# Patient Record
Sex: FEMALE | Race: BLACK OR AFRICAN AMERICAN | NOT HISPANIC OR LATINO | Employment: OTHER | URBAN - METROPOLITAN AREA
[De-identification: names, ages, dates, MRNs, and addresses within clinical notes are randomized per-mention and may not be internally consistent; named-entity substitution may affect disease eponyms.]

---

## 2017-01-03 ENCOUNTER — OFFICE VISIT (OUTPATIENT)
Dept: ORTHOPEDICS | Facility: CLINIC | Age: 72
End: 2017-01-03
Payer: COMMERCIAL

## 2017-01-03 ENCOUNTER — TELEPHONE (OUTPATIENT)
Dept: ORTHOPEDICS | Facility: CLINIC | Age: 72
End: 2017-01-03

## 2017-01-03 VITALS — HEART RATE: 98 BPM | SYSTOLIC BLOOD PRESSURE: 187 MMHG | DIASTOLIC BLOOD PRESSURE: 101 MMHG

## 2017-01-03 DIAGNOSIS — M17.11 PRIMARY OSTEOARTHRITIS OF RIGHT KNEE: Primary | ICD-10-CM

## 2017-01-03 PROCEDURE — 1160F RVW MEDS BY RX/DR IN RCRD: CPT | Mod: S$GLB,,, | Performed by: PHYSICIAN ASSISTANT

## 2017-01-03 PROCEDURE — 1125F AMNT PAIN NOTED PAIN PRSNT: CPT | Mod: S$GLB,,, | Performed by: PHYSICIAN ASSISTANT

## 2017-01-03 PROCEDURE — 99213 OFFICE O/P EST LOW 20 MIN: CPT | Mod: 25,S$GLB,, | Performed by: PHYSICIAN ASSISTANT

## 2017-01-03 PROCEDURE — 20610 DRAIN/INJ JOINT/BURSA W/O US: CPT | Mod: RT,S$GLB,, | Performed by: PHYSICIAN ASSISTANT

## 2017-01-03 PROCEDURE — 3080F DIAST BP >= 90 MM HG: CPT | Mod: S$GLB,,, | Performed by: PHYSICIAN ASSISTANT

## 2017-01-03 PROCEDURE — 1159F MED LIST DOCD IN RCRD: CPT | Mod: S$GLB,,, | Performed by: PHYSICIAN ASSISTANT

## 2017-01-03 PROCEDURE — 3077F SYST BP >= 140 MM HG: CPT | Mod: S$GLB,,, | Performed by: PHYSICIAN ASSISTANT

## 2017-01-03 PROCEDURE — 99999 PR PBB SHADOW E&M-EST. PATIENT-LVL III: CPT | Mod: PBBFAC,,, | Performed by: PHYSICIAN ASSISTANT

## 2017-01-03 PROCEDURE — 1157F ADVNC CARE PLAN IN RCRD: CPT | Mod: S$GLB,,, | Performed by: PHYSICIAN ASSISTANT

## 2017-01-03 RX ORDER — METHYLPREDNISOLONE ACETATE 80 MG/ML
80 INJECTION, SUSPENSION INTRA-ARTICULAR; INTRALESIONAL; INTRAMUSCULAR; SOFT TISSUE
Status: COMPLETED | OUTPATIENT
Start: 2017-01-03 | End: 2017-01-03

## 2017-01-03 RX ADMIN — METHYLPREDNISOLONE ACETATE 80 MG: 80 INJECTION, SUSPENSION INTRA-ARTICULAR; INTRALESIONAL; INTRAMUSCULAR; SOFT TISSUE at 12:01

## 2017-01-03 NOTE — MR AVS SNAPSHOT
[facility]  [address]  [facility] [address]  Phone: [phone]                  Kaylee Romero   1/3/2017 10:15 AM   Appointment    Description:  Female : 1945   Provider:  Cyndi hWite PA-C   Department:  [facility]                To Do List           Future Appointments        Provider Department Dept Phone    2017 1:00 PM Sylvia Barajas RD [facility] - Diabetes Management [phone]    3/21/2017 1:00 PM LAB[facility] [phone]    3/28/2017 1:30 PM Jamshid Rodrigez, APRN,ANP-C [facility] - Endocrinology [phone]      Goals (5 Years of Data)     None      [facility]     [facility] On Call Nurse Care Line -  Assistance  Registered nurses in the [facility] provide clinical advisement, health education, appointment booking, and other advisory services.  Call for this free service at [phone].             Medications           Message regarding Medications     Verify the changes and/or additions to your medication regime listed below are the same as discussed with your clinician today.  If any of these changes or additions are incorrect, please notify your healthcare provider.             Verify that the below list of medications is an accurate representation of the medications you are currently taking.  If none reported, the list may be blank. If incorrect, please contact your healthcare provider. Carry this list with you in case of emergency.           Current Medications     alcohol swabs (ALCOHOL PADS) PadM Apply 1 each topically as needed.    amlodipine (NORVASC) 10 MG tablet take 1 tablet by mouth every morning    blood sugar diagnostic (ONETOUCH VERIO) Strp 1 strip by Misc.(Non-Drug; Combo Route) route 2 (two) times daily.    cyanocobalamin (VITAMIN B-12) 1000 MCG tablet Take 1,000 mcg by mouth once daily.    cyclobenzaprine (FLEXERIL) 10 MG tablet Take 10 mg by mouth 3 (three)  times daily as needed for Muscle spasms.    diphenoxylate-atropine 2.5-0.025 mg (LOMOTIL) 2.5-0.025 mg per tablet Take by mouth 3 (three) times daily as needed. 1 Tablet Oral Three times a day    glimepiride (AMARYL) 2 MG tablet Take 1 tablet (2 mg total) by mouth before breakfast.    ibuprofen (ADVIL,MOTRIN) 800 MG tablet Take 800 mg by mouth as needed.     metformin (GLUCOPHAGE) 500 MG tablet Take 2 tablets (1,000 mg total) by mouth daily with breakfast.    omeprazole (PRILOSEC) 40 MG capsule Take 1 capsule (40 mg total) by mouth once daily.    ONE TOUCH DELICA LANCETS 33 gauge Misc     ONE TOUCH VERIO IQ kit     potassium chloride (MICRO-K) 10 MEQ CpSR take 1 capsule by mouth twice a day    valsartan (DIOVAN) 320 MG tablet take 1 tablet by mouth once daily           Clinical Reference Information           Allergies as of 1/3/2017     Keflex [Cephalexin]    Bactrim [Sulfamethoxazole-trimethoprim]      Immunizations Administered on Date of Encounter - 1/3/2017     None

## 2017-01-03 NOTE — TELEPHONE ENCOUNTER
Spoke with pt who c/o pain to right knee and is requesting to be seen for steroid injection, as she states she is having difficulty walking and that she has to go to work tomorrow.  Noted pt has been seen by [facility]. [facility] for left knee and is considering surgery. Pt states that left knee is, presently,  feeling better than her right knee. Reviewed with FREDRICK Hill and FREDRICK Caballero and pt instructed to come to [facility] at 10:30am today  to be seen by FREDRICK Caballero. Pt states understanding.

## 2017-01-03 NOTE — PROGRESS NOTES
Subjective:      Patient ID: Kaylee Romero is a 71 y.o. female.    Chief Complaint: Pain of the Right Knee    HPI  71 year old female presents with chief complaint of right knee pain x 1 week. She denies trauma. She reports pain at the medial aspect. It is worse with walking and bending. Ibuprofen provides little relief. She reports her knee giving way. She ambulates with a cane. She has h/o left knee OA and plans to undergo left TKA in May with Dr. [facility].   Review of Systems   Constitution: Negative for chills, fever and night sweats.   Cardiovascular: Negative for chest pain.   Respiratory: Negative for cough and shortness of breath.    Hematologic/Lymphatic: Does not bruise/bleed easily.   Skin: Negative for color change.   Gastrointestinal: Negative for heartburn.   Genitourinary: Negative for dysuria.   Neurological: Negative for numbness and paresthesias.   Psychiatric/Behavioral: Negative for altered mental status.   Allergic/Immunologic: Negative for persistent infections.         Objective:            General    Vitals reviewed.  Constitutional: She is oriented to person, place, and time. She appears well-developed and well-nourished.   Cardiovascular: Normal rate.    Neurological: She is alert and oriented to person, place, and time.             Right Knee Exam:  Antalgic gait  ROM 0-120 degrees  +crepitus  No effusion  TTP medial joint line    X-ray: reviewed by myself. DJD both knees L>R.       Assessment:       Encounter Diagnosis   Name Primary?    Primary osteoarthritis of right knee Yes          Plan:       Discussed treatment options with patient. She would like a cortisone injection today. Ice and elevate knee. RTC prn.     PROCEDURE:  I have explained the risks, benefits, and alternatives of the procedure in detail.  The patient voices understanding and all questions have been answered.  The patient agrees to proceed as planned. So after I performed a sterile prep of the skin in the normal  fashion the right knee is injected using a 22 gauge needle from the anteromedial approach with a combination of 4cc 1% plain lidocaine and 80 mg of depo medrol.  The patient is cautioned and immediate relief of pain is secondary to the local anesthetic and will be temporary.  After the anesthetic wears off there may be a increase in pain that may last for a few hours or a few days and they should use ice to help alleviate this flair up of pain.

## 2017-01-03 NOTE — TELEPHONE ENCOUNTER
----- Message from Kim Colorado sent at 1/3/2017  8:37 AM CST -----  Contact: self/home  Pt is unable to walk due to the pain she is experiencing in her rt knee and would like to schedule an appt to have a cortisone inj today.

## 2017-01-06 ENCOUNTER — LAB VISIT (OUTPATIENT)
Dept: LAB | Facility: HOSPITAL | Age: 72
End: 2017-01-06
Attending: FAMILY MEDICINE
Payer: COMMERCIAL

## 2017-01-06 ENCOUNTER — OFFICE VISIT (OUTPATIENT)
Dept: FAMILY MEDICINE | Facility: CLINIC | Age: 72
End: 2017-01-06
Attending: FAMILY MEDICINE
Payer: COMMERCIAL

## 2017-01-06 VITALS
WEIGHT: 221.31 LBS | OXYGEN SATURATION: 96 % | SYSTOLIC BLOOD PRESSURE: 162 MMHG | BODY MASS INDEX: 39.21 KG/M2 | HEART RATE: 81 BPM | DIASTOLIC BLOOD PRESSURE: 88 MMHG | RESPIRATION RATE: 16 BRPM | HEIGHT: 63 IN

## 2017-01-06 DIAGNOSIS — Z78.0 ASYMPTOMATIC MENOPAUSAL STATE: ICD-10-CM

## 2017-01-06 DIAGNOSIS — E11.9 TYPE 2 DIABETES MELLITUS WITHOUT COMPLICATION, WITHOUT LONG-TERM CURRENT USE OF INSULIN: ICD-10-CM

## 2017-01-06 DIAGNOSIS — Z00.00 ANNUAL PHYSICAL EXAM: Primary | ICD-10-CM

## 2017-01-06 DIAGNOSIS — I10 HTN (HYPERTENSION), BENIGN: ICD-10-CM

## 2017-01-06 DIAGNOSIS — Z00.00 ANNUAL PHYSICAL EXAM: ICD-10-CM

## 2017-01-06 DIAGNOSIS — Z12.31 ENCOUNTER FOR SCREENING MAMMOGRAM FOR BREAST CANCER: ICD-10-CM

## 2017-01-06 LAB
ALBUMIN SERPL BCP-MCNC: 3.6 G/DL
ALP SERPL-CCNC: 123 U/L
ALT SERPL W/O P-5'-P-CCNC: 16 U/L
ANION GAP SERPL CALC-SCNC: 10 MMOL/L
AST SERPL-CCNC: 18 U/L
BASOPHILS # BLD AUTO: 0.02 K/UL
BASOPHILS NFR BLD: 0.4 %
BILIRUB SERPL-MCNC: 0.4 MG/DL
BUN SERPL-MCNC: 12 MG/DL
CALCIUM SERPL-MCNC: 10 MG/DL
CHLORIDE SERPL-SCNC: 105 MMOL/L
CHOLEST/HDLC SERPL: 4.5 {RATIO}
CO2 SERPL-SCNC: 23 MMOL/L
CREAT SERPL-MCNC: 1 MG/DL
CREAT UR-MCNC: 109 MG/DL
DIFFERENTIAL METHOD: ABNORMAL
EOSINOPHIL # BLD AUTO: 0.1 K/UL
EOSINOPHIL NFR BLD: 2.8 %
ERYTHROCYTE [DISTWIDTH] IN BLOOD BY AUTOMATED COUNT: 13.8 %
EST. GFR  (AFRICAN AMERICAN): >60 ML/MIN/1.73 M^2
EST. GFR  (NON AFRICAN AMERICAN): 57.6 ML/MIN/1.73 M^2
GLUCOSE SERPL-MCNC: 133 MG/DL
HCT VFR BLD AUTO: 37.3 %
HDL/CHOLESTEROL RATIO: 22 %
HDLC SERPL-MCNC: 218 MG/DL
HDLC SERPL-MCNC: 48 MG/DL
HGB BLD-MCNC: 12.5 G/DL
LDLC SERPL CALC-MCNC: 150.4 MG/DL
LYMPHOCYTES # BLD AUTO: 1.2 K/UL
LYMPHOCYTES NFR BLD: 24.9 %
MCH RBC QN AUTO: 27.2 PG
MCHC RBC AUTO-ENTMCNC: 33.5 %
MCV RBC AUTO: 81 FL
MICROALBUMIN UR DL<=1MG/L-MCNC: 999 UG/ML
MICROALBUMIN/CREATININE RATIO: 916.5 UG/MG
MONOCYTES # BLD AUTO: 0.5 K/UL
MONOCYTES NFR BLD: 9.6 %
NEUTROPHILS # BLD AUTO: 2.9 K/UL
NEUTROPHILS NFR BLD: 62.1 %
NONHDLC SERPL-MCNC: 170 MG/DL
PLATELET # BLD AUTO: 225 K/UL
PMV BLD AUTO: 10 FL
POTASSIUM SERPL-SCNC: 4.4 MMOL/L
PROT SERPL-MCNC: 8.6 G/DL
RBC # BLD AUTO: 4.6 M/UL
SODIUM SERPL-SCNC: 138 MMOL/L
TRIGL SERPL-MCNC: 98 MG/DL
TSH SERPL DL<=0.005 MIU/L-ACNC: 3.08 UIU/ML
WBC # BLD AUTO: 4.69 K/UL

## 2017-01-06 PROCEDURE — 84443 ASSAY THYROID STIM HORMONE: CPT

## 2017-01-06 PROCEDURE — 83036 HEMOGLOBIN GLYCOSYLATED A1C: CPT

## 2017-01-06 PROCEDURE — 36415 COLL VENOUS BLD VENIPUNCTURE: CPT | Mod: PO

## 2017-01-06 PROCEDURE — 80053 COMPREHEN METABOLIC PANEL: CPT

## 2017-01-06 PROCEDURE — 85025 COMPLETE CBC W/AUTO DIFF WBC: CPT

## 2017-01-06 PROCEDURE — 99999 PR PBB SHADOW E&M-EST. PATIENT-LVL IV: CPT | Mod: PBBFAC,,, | Performed by: FAMILY MEDICINE

## 2017-01-06 PROCEDURE — 3077F SYST BP >= 140 MM HG: CPT | Mod: S$GLB,,, | Performed by: FAMILY MEDICINE

## 2017-01-06 PROCEDURE — 80061 LIPID PANEL: CPT

## 2017-01-06 PROCEDURE — 3079F DIAST BP 80-89 MM HG: CPT | Mod: S$GLB,,, | Performed by: FAMILY MEDICINE

## 2017-01-06 PROCEDURE — 99397 PER PM REEVAL EST PAT 65+ YR: CPT | Mod: S$GLB,,, | Performed by: FAMILY MEDICINE

## 2017-01-06 PROCEDURE — 82570 ASSAY OF URINE CREATININE: CPT

## 2017-01-06 RX ORDER — AMOXICILLIN 500 MG/1
500 TABLET, FILM COATED ORAL 2 TIMES DAILY
Qty: 20 TABLET | Refills: 0 | Status: SHIPPED | OUTPATIENT
Start: 2017-01-06 | End: 2017-01-16

## 2017-01-06 RX ORDER — METOPROLOL SUCCINATE 25 MG/1
25 TABLET, EXTENDED RELEASE ORAL NIGHTLY
Qty: 90 TABLET | Refills: 3 | Status: SHIPPED | OUTPATIENT
Start: 2017-01-06 | End: 2017-10-24

## 2017-01-06 RX ORDER — GENTAMICIN SULFATE 0.3 %
0.5 OINTMENT (GRAM) OPHTHALMIC (EYE) 3 TIMES DAILY
Qty: 5 TUBE | Refills: 0 | Status: SHIPPED | OUTPATIENT
Start: 2017-01-06 | End: 2017-01-16

## 2017-01-06 NOTE — MR AVS SNAPSHOT
[facility]  [address]  [address]  [facility] [address]  Phone: [phone]                  Kaylee Romero   2017 11:40 AM   Office Visit    Description:  Female : 1945   Provider:  Lexy Nicole MD   Department:  [facility]           Reason for Visit     Stye     Annual Exam     Diabetes           Diagnoses this Visit        Comments    Annual physical exam    -  Primary     Type 2 diabetes mellitus without complication, without long-term current use of insulin         HTN (hypertension), benign         Encounter for screening mammogram for breast cancer         Asymptomatic menopausal state                To Do List           Future Appointments        Provider Department Dept Phone    2017 11:40 AM Lexy Nicole MD [facility] [phone]    2017 1:00 PM AN[facility] - Diabetes Management [phone]    3/21/2017 1:00 PM [facility] [phone]    3/28/2017 1:30 PM PAYAL Gonzalez,ANP-C [facility] [phone]      Goals (5 Years of Data)     None       These Medications        Disp Refills Start End    amoxicillin (AMOXIL) 500 MG Tab 20 tablet 0 2017    Take 1 tablet (500 mg total) by mouth 2 (two) times daily. - Oral    [id number] [address] [facility] [address] Ph #: [phone]       metoprolol succinate (TOPROL-XL) 25 MG 24 hr tablet 90 tablet 3 2017    Take 1 tablet (25 mg total) by mouth every evening. - Oral    [facility] [facility] [address] Ph #: [phone]       gentamicin (GARAMYCIN) 0.3 % (3 mg/gram) ophthalmic ointment 5 Tube 0 2017    Place 0.5 inches into the left eye 3 (three) times daily. - Left Eye    [facility]: [facility] - [address] Ph #:  [phone]         [facility]     [facility] Nurse Care Line - 24/7 Assistance  Registered nurses in the [facility] provide clinical advisement, health education, appointment booking, and other advisory services.  Call for this free service at [phone].             Medications           Message regarding Medications     Verify the changes and/or additions to your medication regime listed below are the same as discussed with your clinician today.  If any of these changes or additions are incorrect, please notify your healthcare provider.        START taking these NEW medications        Refills    amoxicillin (AMOXIL) 500 MG Tab 0    Sig: Take 1 tablet (500 mg total) by mouth 2 (two) times daily.    Class: Normal    Route: Oral    metoprolol succinate (TOPROL-XL) 25 MG 24 hr tablet 3    Sig: Take 1 tablet (25 mg total) by mouth every evening.    Class: Normal    Route: Oral    gentamicin (GARAMYCIN) 0.3 % (3 mg/gram) ophthalmic ointment 0    Sig: Place 0.5 inches into the left eye 3 (three) times daily.    Class: Normal    Route: Left Eye           Verify that the below list of medications is an accurate representation of the medications you are currently taking.  If none reported, the list may be blank. If incorrect, please contact your healthcare provider. Carry this list with you in case of emergency.           Current Medications     ACCU-CHEK FASTCLIX [facility]     alcohol swabs (ALCOHOL PADS) PadM Apply 1 each topically as needed.    amlodipine (NORVASC) 10 MG tablet take 1 tablet by mouth every morning    amoxicillin (AMOXIL) 500 MG Tab Take 1 tablet (500 mg total) by mouth 2 (two) times daily.    blood sugar diagnostic (ONETOUCH VERIO) Strp 1 strip by Misc.(Non-Drug; Combo Route) route 2 (two) times daily.    cyanocobalamin (VITAMIN B-12) 1000 MCG tablet Take 1,000 mcg by mouth once daily.    cyclobenzaprine (FLEXERIL) 10 MG tablet Take 10 mg by mouth 3 (three) times daily as needed  "for Muscle spasms.    diphenoxylate-atropine 2.5-0.025 mg (LOMOTIL) 2.5-0.025 mg per tablet Take by mouth 3 (three) times daily as needed. 1 Tablet Oral Three times a day    gentamicin (GARAMYCIN) 0.3 % (3 mg/gram) ophthalmic ointment Place 0.5 inches into the left eye 3 (three) times daily.    glimepiride (AMARYL) 2 MG tablet Take 1 tablet (2 mg total) by mouth before breakfast.    ibuprofen (ADVIL,MOTRIN) 800 MG tablet Take 800 mg by mouth as needed.     metformin (GLUCOPHAGE) 500 MG tablet Take 2 tablets (1,000 mg total) by mouth daily with breakfast.    metoprolol succinate (TOPROL-XL) 25 MG 24 hr tablet Take 1 tablet (25 mg total) by mouth every evening.    omeprazole (PRILOSEC) 40 MG capsule Take 1 capsule (40 mg total) by mouth once daily.    ONE TOUCH DELICA LANCETS 33 gauge Misc     potassium chloride (MICRO-K) 10 MEQ CpSR take 1 capsule by mouth twice a day    valsartan (DIOVAN) 320 MG tablet take 1 tablet by mouth once daily           Clinical Reference Information           Vital Signs - Last Recorded  Most recent update: 1/6/2017 10:35 AM by Dex Carrillo MA    BP Pulse Resp Ht Wt SpO2    (!) 162/88 (BP Location: Right arm, Patient Position: Sitting, BP Method: Manual) 81 16 5' 2.5" (1.588 m) 100.4 kg (221 lb 4.8 oz) 96%    BMI                39.83 kg/m2          Blood Pressure          Most Recent Value    BP  (!)  162/88      Allergies as of 1/6/2017     Keflex [Cephalexin]    Bactrim [Sulfamethoxazole-trimethoprim]      Immunizations Administered on Date of Encounter - 1/6/2017     None      Orders Placed During Today's Visit      Normal Orders This Visit    Ambulatory referral to Podiatry     MICROALBUMIN / CREATININE RATIO URINE     Future Labs/Procedures Expected by Expires    CBC auto differential  1/6/2017 1/6/2018    Comprehensive metabolic panel  1/6/2017 1/6/2018    DXA Bone Density Spine And Hip_Axial Skeleton  1/6/2017 1/6/2018    Hemoglobin A1c  1/6/2017 3/7/2018    Lipid panel  " 1/6/2017 3/7/2018    Mammo Digital Screening Bilat with CAD  1/6/2017 3/9/2018    TSH  1/6/2017 1/6/2018    X-Ray Chest PA And Lateral  1/6/2017 1/6/2018    EKG 12-lead  As directed 1/6/2018      Instructions    Your test results will be communicated to you via : [facility], Telephone or Letter.   If you have not received your test results in one week, please contact the clinic at [phone].

## 2017-01-06 NOTE — PATIENT INSTRUCTIONS
Your test results will be communicated to you via : [facility], Telephone or Letter.   If you have not received your test results in one week, please contact the clinic at [phone].

## 2017-01-07 LAB
ESTIMATED AVG GLUCOSE: 206 MG/DL
HBA1C MFR BLD HPLC: 8.8 %

## 2017-01-10 NOTE — PROGRESS NOTES
Subjective:       Patient ID: Kaylee Romero is a 71 y.o. female.    Chief Complaint: Annual Exam and Diabetes    HPI   Pt is here for annual exam pt is generally well no sob/cp stable dm type 2 on amaryl and metformin   no adverse gi side effects fbs in the mid 100's pt admits she has not been on an ada diet  Pt has htn stable on norvasc and b blocker no excessive fatigue and diovan no sob/cp with elevated bp today     Review of Systems   Constitutional: Negative for activity change, chills, diaphoresis, fatigue and fever.   HENT: Negative for congestion, ear discharge, ear pain, hearing loss, postnasal drip, rhinorrhea, sinus pressure, sneezing, sore throat, trouble swallowing and voice change.    Eyes: Negative for photophobia, discharge, redness, itching and visual disturbance.   Respiratory: Negative for cough, chest tightness, shortness of breath and wheezing.    Cardiovascular: Negative for chest pain, palpitations and leg swelling.   Gastrointestinal: Negative for abdominal pain, anal bleeding, blood in stool, constipation, diarrhea, nausea, rectal pain and vomiting.   Genitourinary: Negative for dyspareunia, dysuria, flank pain, frequency, hematuria, menstrual problem, pelvic pain, urgency, vaginal bleeding and vaginal discharge.   Musculoskeletal: Negative for arthralgias, back pain, joint swelling and neck pain.   Skin: Negative for color change and rash.   Neurological: Negative for dizziness, speech difficulty, weakness, light-headedness, numbness and headaches.   Hematological: Does not bruise/bleed easily.   Psychiatric/Behavioral: Negative for agitation, confusion, decreased concentration, sleep disturbance and suicidal ideas. The patient is not nervous/anxious.        Objective:      Physical Exam   Constitutional: She appears well-developed.   Obese female   HENT:   Head: Normocephalic and atraumatic.   Right Ear: External ear normal.   Left Ear: External ear normal.   Nose: Nose normal.  "  Mouth/Throat: Oropharynx is clear and moist.   Eyes: Conjunctivae and EOM are normal. Pupils are equal, round, and reactive to light. Right eye exhibits no discharge. Left eye exhibits no discharge.   Neck: Normal range of motion. Neck supple. No thyromegaly present.   Cardiovascular: Normal rate, regular rhythm, normal heart sounds and intact distal pulses.  Exam reveals no gallop and no friction rub.    Pulmonary/Chest: Effort normal and breath sounds normal. She has no wheezes. She has no rales.   Abdominal: Soft. Bowel sounds are normal. She exhibits no distension. There is no tenderness. There is no rebound and no guarding.   Genitourinary: Vagina normal and uterus normal. No vaginal discharge found.   Musculoskeletal: Normal range of motion. She exhibits no edema or tenderness.   Lymphadenopathy:     She has no cervical adenopathy.   Neurological: She is alert. No cranial nerve deficit. She exhibits normal muscle tone. Coordination normal.   Skin: Skin is warm and dry. No rash noted. No erythema.   Psychiatric: She has a normal mood and affect. Her behavior is normal. Judgment and thought content normal.       Assessment:       1. Annual physical exam    2. Type 2 diabetes mellitus without complication, without long-term current use of insulin    3. HTN (hypertension), benign    4. Encounter for screening mammogram for breast cancer    5. Asymptomatic menopausal state        Plan:     orders cmp lipid cbc hgb a1c tsh micro/creat  Cont meds  Increase diovan  Low fat low salt ada diet  Graded exercise    Health maintenance  Lipid ordered  Flu shot discussed  Tetanus q 10 years  Mammogram discussed  Colonoscopy discussed         "This note will not be shared with the patient."   "

## 2017-01-11 DIAGNOSIS — M75.31 CALCIFIC TENDONITIS OF RIGHT SHOULDER: ICD-10-CM

## 2017-01-12 RX ORDER — IBUPROFEN 800 MG/1
TABLET ORAL
Qty: 90 TABLET | Refills: 2 | Status: ON HOLD | OUTPATIENT
Start: 2017-01-12 | End: 2017-05-01 | Stop reason: HOSPADM

## 2017-01-31 ENCOUNTER — CLINICAL SUPPORT (OUTPATIENT)
Dept: DIABETES | Facility: CLINIC | Age: 72
End: 2017-01-31
Payer: COMMERCIAL

## 2017-01-31 DIAGNOSIS — E11.9 TYPE 2 DIABETES MELLITUS NOT AT GOAL: ICD-10-CM

## 2017-01-31 PROCEDURE — G0108 DIAB MANAGE TRN  PER INDIV: HCPCS | Mod: S$GLB,,, | Performed by: DIETITIAN, REGISTERED

## 2017-01-31 NOTE — PROGRESS NOTES
"   01/31/17 0000   Diabetes Type   Diabetes Type  Type II   Nutrition   Meal Planning skipping meals;water;eats out seldom;snacks between meal  (Pt states that she eats only one meal per day and a couple of snacks of veggies and nuts)   Monitoring    Blood Glucose Logs Yes  (Meter present at visit show 4 readings since Jan 1st. Pt reports that she does not like to SMBG when she has had a steriod injection becasue the numbers "depress her")   Exercise    Frequency Never   Current Diabetes Treatment    Current Treatment Oral Medicaiton  (taking Metformin 1000 mg daily)   Social History   Preferred Learning Method Face to Face;Demonstration;Hands On;Reading Materials   Primary Support Self;Spouse   Educational Level College Graduate   Occupation  at [employer]   Smoking Status Never a Smoker   Alcohol Use Never   Barriers to Change   Barriers to Change None   Learning Challenges  None   Readiness to Learn    Readiness to Learn  Acceptance   Diabetes Education Visit   Diabetes Education Record Assessment/Progress Initial/Comprehensive   Diabetes Education Assessment/Progress   Acute Complications (preventing, detecting, and treating acute complications) DC;IS;1  (reviewed s/s of hypo and fact taht risk is very low with current meds)   Chronic Complications (preventing, detecting, and treating chronic complications) DC   Diabetes Disease Process (diabetes disease process and treatment options) DC   Nutrition (Incorporating nutritional management into one's lifestyle) DC;IS;W;5;1  (Instructed on foods that contain CHO, timing and spacing of meals and snacks, importance of regular balanced meals, and portion control with carb foods. rec 2-3 servings carb per meal and provided sample meal plan)   Physical Activity (incorporating physical activity into one's lifestyle) N/A   Medications (states correct name, dose, onset, peak, duration, side effects & timing of meds) DC;IS;1  (encouaged compliance " with meds at all times)   Monitoring (monitoring blood glucose/other parameters &using results) DC;IS;1  (stressed importance of SMBG daily at varying times)   Goal Setting and Problem Solving (verbalizes behavior change strategies & sets realistic goals) DC   Behavior Change (developing personal strategies to health & behavior change) DC   Psychosocial Issues (deveopling personal srategies to address psychosocial concerns) DC   Goals   Monitoring Set  (Pt will SMBG regularly and bring logs to next visit with provider)   Start Date 01/31/17   Diabetes Self-Management Support Plan   Stress Management family   Review Status Patient reviewed and agreed to support plan.   Diabetes Care Plan/Intervention   Education Plan/Intervention In F/U DSMT   Diabetes Meal Plan   Carbohydrate Per Meal 30-45g   Education Units of Time    Time Spent 60 min

## 2017-02-01 ENCOUNTER — TELEPHONE (OUTPATIENT)
Dept: FAMILY MEDICINE | Facility: CLINIC | Age: 72
End: 2017-02-01

## 2017-02-01 NOTE — TELEPHONE ENCOUNTER
----- Message from Violeta Dennis sent at 2/1/2017  7:24 AM CST -----  Pt called and stated she's forgetting to take her metoprolol succinate (TOPROL-XL) 25 MG 24 hr tablet once at night.  Pt would like to know if she can take it in the morning with the other rx she takes.  Please give pt a call

## 2017-02-01 NOTE — TELEPHONE ENCOUNTER
Patient was informed that it is okay for her to take her metoprolol succinate 25mg 24 in the morning with the rest of her medications.I have informed the patient that she may be tired.Patient verbally understands.

## 2017-02-01 NOTE — TELEPHONE ENCOUNTER
Patient's  was left a message to have the patient to give me a call back at her earliest convenience.

## 2017-02-24 ENCOUNTER — TELEPHONE (OUTPATIENT)
Dept: ORTHOPEDICS | Facility: CLINIC | Age: 72
End: 2017-02-24

## 2017-02-24 NOTE — TELEPHONE ENCOUNTER
----- Message from Angelica Wolff sent at 2/24/2017  8:57 AM CST -----  Contact: self @[phone] [phone]  Pt is calling to speak with selma she need a letter stating that she need knee replacement surgery and that he is able to schedule the surgery for 05/01/2017, sent to   [facility] ATTN:  josé luis small fax [phone] fax [phone]

## 2017-03-13 DIAGNOSIS — M17.12 PRIMARY OSTEOARTHRITIS OF LEFT KNEE: Primary | ICD-10-CM

## 2017-03-21 ENCOUNTER — LAB VISIT (OUTPATIENT)
Dept: LAB | Facility: HOSPITAL | Age: 72
End: 2017-03-21
Attending: NURSE PRACTITIONER
Payer: COMMERCIAL

## 2017-03-21 DIAGNOSIS — E11.9 TYPE 2 DIABETES MELLITUS NOT AT GOAL: ICD-10-CM

## 2017-03-21 LAB — TSH SERPL DL<=0.005 MIU/L-ACNC: 1.78 UIU/ML

## 2017-03-21 PROCEDURE — 83036 HEMOGLOBIN GLYCOSYLATED A1C: CPT

## 2017-03-21 PROCEDURE — 82652 VIT D 1 25-DIHYDROXY: CPT

## 2017-03-21 PROCEDURE — 84443 ASSAY THYROID STIM HORMONE: CPT

## 2017-03-21 PROCEDURE — 36415 COLL VENOUS BLD VENIPUNCTURE: CPT | Mod: PO

## 2017-03-22 LAB
ESTIMATED AVG GLUCOSE: 169 MG/DL
HBA1C MFR BLD HPLC: 7.5 %

## 2017-03-23 LAB — 1,25(OH)2D3 SERPL-MCNC: 56 PG/ML

## 2017-03-28 ENCOUNTER — HOSPITAL ENCOUNTER (OUTPATIENT)
Dept: RADIOLOGY | Facility: HOSPITAL | Age: 72
Discharge: HOME | End: 2017-03-28
Attending: FAMILY MEDICINE
Payer: COMMERCIAL

## 2017-03-28 ENCOUNTER — OFFICE VISIT (OUTPATIENT)
Dept: ENDOCRINOLOGY | Facility: CLINIC | Age: 72
End: 2017-03-28
Payer: COMMERCIAL

## 2017-03-28 ENCOUNTER — HOSPITAL ENCOUNTER (OUTPATIENT)
Dept: CARDIOLOGY | Facility: CLINIC | Age: 72
Discharge: HOME | End: 2017-03-28
Attending: FAMILY MEDICINE
Payer: COMMERCIAL

## 2017-03-28 VITALS
DIASTOLIC BLOOD PRESSURE: 88 MMHG | SYSTOLIC BLOOD PRESSURE: 160 MMHG | WEIGHT: 218 LBS | BODY MASS INDEX: 40.12 KG/M2 | HEIGHT: 62 IN

## 2017-03-28 DIAGNOSIS — Z12.31 ENCOUNTER FOR SCREENING MAMMOGRAM FOR BREAST CANCER: ICD-10-CM

## 2017-03-28 DIAGNOSIS — I10 HTN (HYPERTENSION), BENIGN: ICD-10-CM

## 2017-03-28 DIAGNOSIS — E11.9 TYPE 2 DIABETES MELLITUS WITHOUT COMPLICATION, WITHOUT LONG-TERM CURRENT USE OF INSULIN: ICD-10-CM

## 2017-03-28 DIAGNOSIS — E11.9 TYPE 2 DIABETES MELLITUS NOT AT GOAL: Primary | ICD-10-CM

## 2017-03-28 DIAGNOSIS — E78.5 HYPERLIPIDEMIA, UNSPECIFIED HYPERLIPIDEMIA TYPE: ICD-10-CM

## 2017-03-28 DIAGNOSIS — G47.33 OBSTRUCTIVE SLEEP APNEA: ICD-10-CM

## 2017-03-28 DIAGNOSIS — E66.9 OBESITY (BMI 30-39.9): ICD-10-CM

## 2017-03-28 DIAGNOSIS — M17.12 PRIMARY OSTEOARTHRITIS OF LEFT KNEE: ICD-10-CM

## 2017-03-28 DIAGNOSIS — Z00.00 ANNUAL PHYSICAL EXAM: ICD-10-CM

## 2017-03-28 DIAGNOSIS — I10 ESSENTIAL HYPERTENSION: ICD-10-CM

## 2017-03-28 PROCEDURE — 3079F DIAST BP 80-89 MM HG: CPT | Mod: S$GLB,,, | Performed by: NURSE PRACTITIONER

## 2017-03-28 PROCEDURE — 1160F RVW MEDS BY RX/DR IN RCRD: CPT | Mod: S$GLB,,, | Performed by: NURSE PRACTITIONER

## 2017-03-28 PROCEDURE — 1157F ADVNC CARE PLAN IN RCRD: CPT | Mod: S$GLB,,, | Performed by: NURSE PRACTITIONER

## 2017-03-28 PROCEDURE — 1159F MED LIST DOCD IN RCRD: CPT | Mod: S$GLB,,, | Performed by: NURSE PRACTITIONER

## 2017-03-28 PROCEDURE — 77067 SCR MAMMO BI INCL CAD: CPT | Mod: 26,,, | Performed by: RADIOLOGY

## 2017-03-28 PROCEDURE — 1126F AMNT PAIN NOTED NONE PRSNT: CPT | Mod: S$GLB,,, | Performed by: NURSE PRACTITIONER

## 2017-03-28 PROCEDURE — 77067 SCR MAMMO BI INCL CAD: CPT | Mod: TC

## 2017-03-28 PROCEDURE — 71020 XR CHEST PA AND LATERAL: CPT | Mod: 26,,, | Performed by: RADIOLOGY

## 2017-03-28 PROCEDURE — 93000 ELECTROCARDIOGRAM COMPLETE: CPT | Mod: S$GLB,,, | Performed by: INTERNAL MEDICINE

## 2017-03-28 PROCEDURE — 4010F ACE/ARB THERAPY RXD/TAKEN: CPT | Mod: S$GLB,,, | Performed by: NURSE PRACTITIONER

## 2017-03-28 PROCEDURE — 99214 OFFICE O/P EST MOD 30 MIN: CPT | Mod: S$GLB,,, | Performed by: NURSE PRACTITIONER

## 2017-03-28 PROCEDURE — 71020 XR CHEST PA AND LATERAL: CPT | Mod: TC

## 2017-03-28 PROCEDURE — 3077F SYST BP >= 140 MM HG: CPT | Mod: S$GLB,,, | Performed by: NURSE PRACTITIONER

## 2017-03-28 PROCEDURE — 3045F PR MOST RECENT HEMOGLOBIN A1C LEVEL 7.0-9.0%: CPT | Mod: S$GLB,,, | Performed by: NURSE PRACTITIONER

## 2017-03-28 PROCEDURE — 99999 PR PBB SHADOW E&M-EST. PATIENT-LVL III: CPT | Mod: PBBFAC,,, | Performed by: NURSE PRACTITIONER
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
## 2017-03-28 NOTE — PATIENT INSTRUCTIONS
"  Using Insulin  Insulin is given with shots (injections) in the fatty layer under the skin (subcutaneous). Some people use an implanted device called an insulin pump, while others inject insulin using prefilled "pens." Your healthcare provider, nurse, diabetes educator, or others will give you instructions about using insulin. Make sure you follow all instructions about when and where you use it.  Where to inject your insulin     Injection sites in adults include the belly (abdomen), front of thighs, back of upper arms and upper buttocks.   · Injecting insulin in the same area (like your belly) means that insulin is absorbed the same way.  · Insulin is most often injected in the abdominal fat. That is where it is absorbed most quickly.  · Change the injection site each time you give yourself insulin. This helps prevent problems.  · Have an organized way of moving from site to site.  · Leave at least 2 inches around your belly button (navel).  When to inject your insulin  · Make sure you follow your healthcare provider's instructions about when you should give yourself insulin.  · The timing of insulin injections with meals or snacks is very important.  Getting ready to inject insulin from a bottle  · Wash your hands. Use soap and warm water.  · Wipe the top of the insulin bottle (vial) with alcohol.    Preparing the insulin  · Pull back the plunger until the end of the plunger is even with the number of units of insulin you take. Always read the numbers of units of insulin at your eye level.   · Insert the needle into the top of the bottle. Hold the needle and bottle straight up and down. Make sure the needle is in the insulin. Then push the plunger in all the way, pushing air into the insulin.  · Turn the bottle and syringe upside down so that the bottle is on top.  · Pull back on the plunger until the end of the plunger is even with the number of units of insulin you take.  · Remove the needle. Then tap the " syringe with a fingertip to remove any air bubbles.    Injecting the insulin  · Gently pinch up about 1 inch of skin. Do not squeeze the skin.  · Insert the needle.  · Push in the plunger. Press until the syringe is empty. Let go of the skin. Then remove the needle. Dont rub the site after you remove the needle.  Disposing of the syringe  · Put the needle and syringe in a sharps container. And dont recap the needle.  · When the sharps container is full, put it into a garbage bag and secure the top. Label the bag needles or sharps.  · Call your local waste company to ask about removing the sharps container. You can also check with the Coalition for Safe Community Needle Disposal at 895-066-4064, www.safeneedledisposal.org.  Storing your insulin  · Keep unopened insulin bottles in the refrigerator. An open bottle can be stored at room temperature (such as on the kitchen counter). But dont let the insulin get too hot. Always keep it below 86°F (30°C). And never let it freeze!  · Always use insulin before the expiration date on the bottle. Throw  bottles away.  · Use insulin within 28 days of opening the bottle. After 28 days, throw it away. To remember, write the date you opened it on the bottle.  · When you travel, be sure to take all of your diabetes supplies. Put them in a bag made to protect insulin from heat and cold. Always keep them with you. If there is a delay or your suitcase is lost, you will have what you need.  · Never leave insulin in the car. It can get too hot or too cold.  Date Last Reviewed: 2016  © 0012-0789 [employer]. [address] [place] [address], [place] [address]. All rights reserved. This information is not intended as a substitute for professional medical care. Always follow your healthcare professional's instructions.

## 2017-03-28 NOTE — MR AVS SNAPSHOT
[facility] - Endocrinology  [address] [address]  Phone: [phone]                  Kaylee Romero   3/28/2017 1:30 PM   Office Visit    Description:  Female : 1945   Provider:  PAYAL Gonzalez,ANP-C   Department:  [facility] - Endocrinology           Reason for Visit     Diabetes Mellitus           Diagnoses this Visit        Comments    Type 2 diabetes mellitus not at goal    -  Primary     Essential hypertension         Hyperlipidemia, unspecified hyperlipidemia type         Obstructive sleep apnea         Obesity (BMI 30-39.9)         Primary osteoarthritis of left knee                To Do List           Future Appointments        Provider Department Dept Phone    3/28/2017 3:15 PM [facility] [id number] LB LIMIT [facility] [phone]    3/28/2017 4:00 PM NOM [id number] SCREEN INT MED [facility] [phone]    3/29/2017 11:00 AM Paz White MD [facility] Ophthalmology [phone]    2017 10:00 AM DAVID AveryC [facility] Orthoped[facility] [phone]    2017 11:20 AM LAB, APPOINTMENT [place] [facility] [phone]      Your Future Surgeries/Procedures     May 01, 2017   [facility]gery with [facility]   [facility])    [address] [address] [phone]              Goals (5 Years of Data)     None      Follow-Up and Disposition     Return in about 4 months (around 2017).       These Medications        Disp Refills Start End    dulaglutide (TRULICITY) 0.75 mg/0.5 mL PnIj 4 Syringe 11 3/28/2017     Inject 0.75 mg into the skin once a week. - Subcutaneous    Pharmacy: [id number] - [facility] [address] AT [facility] #: [phone]         [facility]     [facility] On Call Nurse Care Line -  Assistance  Registered nurses in the [facility] On Call  Center provide clinical advisement, health education, appointment booking, and other advisory services.  Call for this free service at [phone].             Medications           Message regarding Medications     Verify the changes and/or additions to your medication regime listed below are the same as discussed with your clinician today.  If any of these changes or additions are incorrect, please notify your healthcare provider.        START taking these NEW medications        Refills    dulaglutide (TRULICITY) 0.75 mg/0.5 mL PnIj 11    Sig: Inject 0.75 mg into the skin once a week.    Class: Normal    Route: Subcutaneous      STOP taking these medications     glimepiride (AMARYL) 2 MG tablet Take 1 tablet (2 mg total) by mouth before breakfast.           Verify that the below list of medications is an accurate representation of the medications you are currently taking.  If none reported, the list may be blank. If incorrect, please contact your healthcare provider. Carry this list with you in case of emergency.           Current Medications     ACCU-CHEK FASTCLIX [facility]     alcohol swabs (ALCOHOL PADS) PadM Apply 1 each topically as needed.    amlodipine (NORVASC) 10 MG tablet take 1 tablet by mouth every morning    blood sugar diagnostic (ONETOUCH VERIO) Strp 1 strip by Misc.(Non-Drug; Combo Route) route 2 (two) times daily.    cyanocobalamin (VITAMIN B-12) 1000 MCG tablet Take 1,000 mcg by mouth once daily.    cyclobenzaprine (FLEXERIL) 10 MG tablet Take 10 mg by mouth 3 (three) times daily as needed for Muscle spasms.    diphenoxylate-atropine 2.5-0.025 mg (LOMOTIL) 2.5-0.025 mg per tablet Take by mouth 3 (three) times daily as needed. 1 Tablet Oral Three times a day    dulaglutide (TRULICITY) 0.75 mg/0.5 mL PnIj Inject 0.75 mg into the skin once a week.    ibuprofen (ADVIL,MOTRIN) 800 MG tablet Take 800 mg by mouth as needed.     ibuprofen (ADVIL,MOTRIN) 800 MG tablet take 1 tablet by mouth every 8 hours if  "needed for pain    metformin (GLUCOPHAGE) 500 MG tablet Take 2 tablets (1,000 mg total) by mouth daily with breakfast.    metoprolol succinate (TOPROL-XL) 25 MG 24 hr tablet Take 1 tablet (25 mg total) by mouth every evening.    omeprazole (PRILOSEC) 40 MG capsule Take 1 capsule (40 mg total) by mouth once daily.    ONE TOUCH DELICA LANCETS 33 gauge Misc     potassium chloride (MICRO-K) 10 MEQ CpSR take 1 capsule by mouth twice a day    valsartan (DIOVAN) 320 MG tablet take 1 tablet by mouth once daily           Clinical Reference Information           Your Vitals Were     BP Height Weight BMI       160/88 5' 2" (1.575 m) 98.9 kg (218 lb) 39.87 kg/m2       Blood Pressure          Most Recent Value    BP  (!)  160/88      Allergies as of 3/28/2017     Keflex [Cephalexin]    Bactrim [Sulfamethoxazole-trimethoprim]      Immunizations Administered on Date of Encounter - 3/28/2017     None      Orders Placed During Today's Visit     Future Labs/Procedures Expected by Expires    Hemoglobin A1c  3/28/2017 5/27/2018      Instructions      Using Insulin  Insulin is given with shots (injections) in the fatty layer under the skin (subcutaneous). Some people use an implanted device called an insulin pump, while others inject insulin using prefilled "pens." Your healthcare provider, nurse, diabetes educator, or others will give you instructions about using insulin. Make sure you follow all instructions about when and where you use it.  Where to inject your insulin     Injection sites in adults include the belly (abdomen), front of thighs, back of upper arms and upper buttocks.   · Injecting insulin in the same area (like your belly) means that insulin is absorbed the same way.  · Insulin is most often injected in the abdominal fat. That is where it is absorbed most quickly.  · Change the injection site each time you give yourself insulin. This helps prevent problems.  · Have an organized way of moving from site to site.  · Leave " at least 2 inches around your belly button (navel).  When to inject your insulin  · Make sure you follow your healthcare provider's instructions about when you should give yourself insulin.  · The timing of insulin injections with meals or snacks is very important.  Getting ready to inject insulin from a bottle  · Wash your hands. Use soap and warm water.  · Wipe the top of the insulin bottle (vial) with alcohol.    Preparing the insulin  · Pull back the plunger until the end of the plunger is even with the number of units of insulin you take. Always read the numbers of units of insulin at your eye level.   · Insert the needle into the top of the bottle. Hold the needle and bottle straight up and down. Make sure the needle is in the insulin. Then push the plunger in all the way, pushing air into the insulin.  · Turn the bottle and syringe upside down so that the bottle is on top.  · Pull back on the plunger until the end of the plunger is even with the number of units of insulin you take.  · Remove the needle. Then tap the syringe with a fingertip to remove any air bubbles.    Injecting the insulin  · Gently pinch up about 1 inch of skin. Do not squeeze the skin.  · Insert the needle.  · Push in the plunger. Press until the syringe is empty. Let go of the skin. Then remove the needle. Dont rub the site after you remove the needle.  Disposing of the syringe  · Put the needle and syringe in a sharps container. And dont recap the needle.  · When the sharps container is full, put it into a garbage bag and secure the top. Label the bag needles or sharps.  · Call your local waste company to ask about removing the sharps container. You can also check with the Coalition for Safe Community Needle Disposal at 431-218-7582, www.safeneedledisposal.org.  Storing your insulin  · Keep unopened insulin bottles in the refrigerator. An open bottle can be stored at room temperature (such as on the kitchen counter). But dont let  the insulin get too hot. Always keep it below 86°F (30°C). And never let it freeze!  · Always use insulin before the expiration date on the bottle. Throw  bottles away.  · Use insulin within 28 days of opening the bottle. After 28 days, throw it away. To remember, write the date you opened it on the bottle.  · When you travel, be sure to take all of your diabetes supplies. Put them in a bag made to protect insulin from heat and cold. Always keep them with you. If there is a delay or your suitcase is lost, you will have what you need.  · Never leave insulin in the car. It can get too hot or too cold.  Date Last Reviewed: 2016  © 0573-1866 [employer]. [address] [place] [address], [place] [address]. All rights reserved. This information is not intended as a substitute for professional medical care. Always follow your healthcare professional's instructions.             Language Assistance Services     ATTENTION: Language assistance services are available, free of charge. Please call [phone].      ATENCIÓN: Si habla español, tiene a lopez disposición servicios gratuitos de asistencia lingüística. Llame al [phone].     ERNESTINE Ý: N?u b?n nói Ti?ng Vi?t, có các d?ch v? h? tr? ngôn ng? mi?n phí dành cho b?n. G?i s? [phone].         [facility] complies with applicable Federal civil rights laws and does not discriminate on the basis of race, color, national origin, age, disability, or sex.

## 2017-03-29 ENCOUNTER — PROCEDURE VISIT (OUTPATIENT)
Dept: OPHTHALMOLOGY | Facility: CLINIC | Age: 72
End: 2017-03-29
Payer: COMMERCIAL

## 2017-03-29 ENCOUNTER — HOSPITAL ENCOUNTER (OUTPATIENT)
Dept: RADIOLOGY | Facility: OTHER | Age: 72
Discharge: HOME | End: 2017-03-29
Attending: FAMILY MEDICINE
Payer: COMMERCIAL

## 2017-03-29 ENCOUNTER — TELEPHONE (OUTPATIENT)
Dept: FAMILY MEDICINE | Facility: CLINIC | Age: 72
End: 2017-03-29

## 2017-03-29 VITALS — DIASTOLIC BLOOD PRESSURE: 88 MMHG | HEART RATE: 73 BPM | SYSTOLIC BLOOD PRESSURE: 137 MMHG

## 2017-03-29 DIAGNOSIS — H00.14 CHALAZION LEFT UPPER EYELID: Primary | ICD-10-CM

## 2017-03-29 DIAGNOSIS — Z78.0 ASYMPTOMATIC MENOPAUSAL STATE: ICD-10-CM

## 2017-03-29 PROCEDURE — 67800 REMOVE EYELID LESION: CPT | Mod: E1,S$GLB,, | Performed by: OPHTHALMOLOGY

## 2017-03-29 PROCEDURE — 92012 INTRM OPH EXAM EST PATIENT: CPT | Mod: 25,S$GLB,, | Performed by: OPHTHALMOLOGY

## 2017-03-29 PROCEDURE — 77080 DXA BONE DENSITY AXIAL: CPT | Mod: 26,,, | Performed by: RADIOLOGY

## 2017-03-29 PROCEDURE — 77080 DXA BONE DENSITY AXIAL: CPT | Mod: TC

## 2017-03-29 RX ORDER — HYDROCODONE BITARTRATE AND ACETAMINOPHEN 5; 325 MG/1; MG/1
1 TABLET ORAL EVERY 6 HOURS PRN
Qty: 5 TABLET | Refills: 0 | Status: SHIPPED | OUTPATIENT
Start: 2017-03-29 | End: 2017-04-20

## 2017-03-29 RX ORDER — TOBRAMYCIN AND DEXAMETHASONE 3; 1 MG/ML; MG/ML
1-2 SUSPENSION/ DROPS OPHTHALMIC
Qty: 5 ML | Refills: 0 | Status: SHIPPED | OUTPATIENT
Start: 2017-03-29 | End: 2017-04-08

## 2017-03-29 NOTE — PROGRESS NOTES
HPI     Suki López, OD     is here for excision of AGSUTIN.           Last edited by Chava Camacho MA on 3/29/2017 11:19 AM.         Assessment /Plan     For exam results, see Encounter Report.    Chalazion left upper eyelid    1. Chalazion AGUSTIN  - failed conservative measures  - plan for excision today    Attending: Paz White MD  Resident: Marina Foley MD  Pre-op Dx: Left upper Lid Chalazion   Post-op Dx: same  Local: 2% lidocaine with epinephrine with 4% NaHCO3 and Vitrase  Specimens: none   Complications: None  Blood Loss: minimal      The margins of the chalazia were marked with a skin marking pen.  Local injection of 2% lidocaine with epinephrine with 4% NaHCO3 and Vitrase was placed into the left upper eyelid. The patient was prepped and draped in the usual sterile manner for ophthalmic plastic surgery. A corneal shield was placed in the left palpebral fissure.  A Chalazion clamp was placed on the upper eyelid.  A vertical incision was made followed by curretage of the area.  Hemostasis was achieved with cautery.  Tobradex ana was applied to the wound.      The patient was instructed to use Tobradex gtts QID for 1 week and return in 2 weeks.    Post-operative instructions were given to the patient.

## 2017-03-29 NOTE — TELEPHONE ENCOUNTER
----- Message from Karyn Mcintyre sent at 3/29/2017  7:42 AM CDT -----  Contact: self  Patient  need to reschedule bone density appointment cancelled on yesterday.  Patient need  Bone density  appointment for today before 11am procedure or after her procedure.   Please call pt at [phone]

## 2017-04-16 ENCOUNTER — ANESTHESIA EVENT (OUTPATIENT)
Dept: SURGERY | Facility: HOSPITAL | Age: 72
DRG: 470 | End: 2017-04-16
Payer: COMMERCIAL

## 2017-04-17 DIAGNOSIS — I10 ESSENTIAL HYPERTENSION: ICD-10-CM

## 2017-04-17 DIAGNOSIS — Z91.89 AT RISK OF UTI: Primary | ICD-10-CM

## 2017-04-17 DIAGNOSIS — M79.606 PAIN OF LOWER EXTREMITY, UNSPECIFIED LATERALITY: ICD-10-CM

## 2017-04-17 NOTE — ANESTHESIA PREPROCEDURE EVALUATION
Anesthesia Assessment: Preoperative EQUATION    Planned Procedure: Procedure(s) (LRB):  REPLACEMENT-KNEE-TOTAL (Left)  Requested Anesthesia Type:Femoral Block  Surgeon: [facility]. [facility] Jr., MD  Service: Orthopedics  Known or anticipated Date of Surgery:5/1/2017    Surgeon notes: reviewed    Triage considerations:     The patient has no apparent active cardiac condition (No unstable coronary Syndrome such as severe unstable angina or recent [<1 month] myocardial infarction, decompensated CHF, severe valvular   disease or significant arrhythmia)    Previous anesthesia records:[facility] 2012    Last PCP note: [facility]   Subspecialty notes: Endocrinology    Other important co-morbidities: Diastolic dysfunction, KAMRAN, DM, HTN, morbid obesity     Tests already available:  No recent tests.            Instructions given. (See in Nurse's note)    Optimization:  Anesthesia Preop Clinic Assessment  Indicated    Medical Opinion Indicated           Plan:    Testing:  Hematology Profile, BMP, PT/INR and UA   Pre-anesthesia  visit       Visit focus: possible regional anesthesia and/or nerve block      Consultation:IM Perioperative Hospitalist      Navigation: Tests Scheduled.              Consults scheduled.             Results will be tracked by Preop Clinic.                                                                                                                04/16/2017  Kaylee Romero is a 72 y.o., female.    Anesthesia Evaluation    I have reviewed the Patient Summary Reports.    I have reviewed the Nursing Notes.   I have reviewed the Medications.     Review of Systems  Anesthesia Hx:  No problems with previous Anesthesia  History of prior surgery of interest to airway management or planning: Previous anesthesia: Nerve Block, General appendectomy with general anesthesia.  for 8/1/2012.  Procedure performed at an [facility]. Denies Family Hx of Anesthesia complications.   Denies Personal Hx of  Anesthesia complications.   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 Denies Tobacco Use. Denies Alcohol Use.   Hematology/Oncology:         -- Denies Anemia: Denies Current/Recent Cancer   EENT/Dental:   Denies Eye Symptoms  Denies ear symptoms  Denies Nasal Symptoms.  Denies Throat Symptoms  Denies Active Dental Problems  Denies Jaw Problems   Cardiovascular:   Exercise tolerance: good Hypertension  Functional Capacity good / => 4 METS, minimal walking around house, restricted due to knee  Denies Cardiovascular Symptoms.  Denies Coronary Artery Disease.  Hypertension, Essential Hypertension , Pt in Pre-HTN range, systolic 130 - 139 or diastolic 85 - 89, Fairly Controlled on RX , Recent typical clinic B/P of 141/71  Denies Other Cardiac Conditions   Pulmonary:   Sleep Apnea  Denies Asthma.  Denies Chronic Obstructive Pulmonary Disease (COPD).  Obstructive Sleep Apnea (KAMRAN), CPAP prescribed, CPAP used.   Renal/:   Chronic Renal Disease, CRI  Kidney Function/Disease, Chronic Kidney Disease (CKD) , CKD Stage II (GFR 60-89)    Hepatic/GI:   GERD, well controlled  Denies Esophageal / Stomach Disorders    Musculoskeletal:   Arthritis   Joint Disease:  Arthritis, Osteoarthritis, knee    Neurological:  Pain , onset is chronic , location of L knee , quality of aching/dull, throbbing , radiating to none , severity is a 7 , precipitating factors are being on feet, walking , alleviating factors are rest, antiinflammatory. Arthritis, Osteoarthritis, knee  Denies Seizure Disorder  Denies CVA - Cerebrovasular Accident  Denies TIA - Transient Ischemic Attack  Head Injury, Concussion  Denies Movement Disorder Dx   Endocrine:   Diabetes, type 2  Diabetes, Type 2 Diabetes for 2012 years , controlled by insulin, oral hypoglycemics. , most recent HgA1c value was 7.5 on 3/17/2017.  Denies Thyroid Disease        Physical Exam  General:  Well nourished, Obesity    Airway/Jaw/Neck:  Airway Findings: Mouth  Opening: Normal Tongue: Normal  General Airway Assessment: Adult, Good  Mallampati: III  Improves to II with phonation.  TM Distance: Normal, at least 6 cm        Eyes/Ears/Nose:  EYES/EARS/NOSE FINDINGS: Normal   Dental:  Dental Findings: In tact   Chest/Lungs:  Chest/Lungs Findings: Clear to auscultation     Heart/Vascular:  Heart Findings: Rate: Normal  Rhythm: Regular Rhythm  Sounds: Normal  Heart murmur: negative Vascular Findings: Normal    Abdomen:  Abdomen Findings: Normal    Musculoskeletal:  Musculoskeletal Findings: Tender Joint    Skin:  Skin Findings: Normal    Mental Status:  Mental Status Findings:  Cooperative, Alert and Oriented         Anesthesia Plan  Type of Anesthesia, risks & benefits discussed:  Anesthesia Type:  spinal, regional, CSE, general  Patient's Preference:   Intra-op Monitoring Plan:   Intra-op Monitoring Plan Comments:   Post Op Pain Control Plan: single-shot nerve block and continuous nerve block  Post Op Pain Control Plan Comments:   Induction:   IV  Beta Blocker:  Patient is on a Beta-Blocker and has received one dose within the past 24 hours (No further documentation required).       Informed Consent: Patient understands risks and agrees with Anesthesia plan.  Questions answered. Anesthesia consent signed with patient.  ASA Score: 3     Day of Surgery Review of History & Physical:    H&P update referred to the surgeon.         Ready For Surgery From Anesthesia Perspective.   Pt seen by Dr Savage, Internal Medicine, see note attended Prehab AIC 7.5 Awaiting labs EKG.     Pt has caregiver  at home who understands need to stay with her until Infusion pump removed  Princess Caballero NP  4/24/2017

## 2017-04-17 NOTE — PRE ADMISSION SCREENING
Anesthesia Assessment: Preoperative EQUATION    Planned Procedure: Procedure(s) (LRB):  REPLACEMENT-KNEE-TOTAL (Left)  Requested Anesthesia Type:Femoral Block  Surgeon: [facility]., MD  Service: Orthopedics  Known or anticipated Date of Surgery:5/1/2017    Surgeon notes: reviewed    Triage considerations:     The patient has no apparent active cardiac condition (No unstable coronary Syndrome such as severe unstable angina or recent [<1 month] myocardial infarction, decompensated CHF, severe valvular   disease or significant arrhythmia)    Previous anesthesia records:[facility] 2012    Last PCP note: [facility]   Subspecialty notes: Endocrinology    Other important co-morbidities: Diastolic dysfunction, KAMRAN, DM, HTN, morbid obesity     Tests already available:  No recent tests.            Instructions given. (See in Nurse's note)    Optimization:  Anesthesia Preop Clinic Assessment  Indicated    Medical Opinion Indicated           Plan:    Testing:  Hematology Profile, BMP, PT/INR and UA   Pre-anesthesia  visit       Visit focus: possible regional anesthesia and/or nerve block      Consultation:IM Perioperative Hospitalist      Navigation: Tests Scheduled.              Consults scheduled.             Results will be tracked by Preop Clinic.

## 2017-04-18 ENCOUNTER — TELEPHONE (OUTPATIENT)
Dept: ORTHOPEDICS | Facility: CLINIC | Age: 72
End: 2017-04-18

## 2017-04-18 NOTE — TELEPHONE ENCOUNTER
Returned direct message left on VM. Pt requests to change joint class if possible. Informed pt the 4/25 class is the only one offered before sx date. Pt states she will reschedule her husbands cardiology appt that is at the same time, as she wants to attend her husbands appt. Pt will keep the joint class on 4/25/17. Understanding verbalized.

## 2017-04-20 ENCOUNTER — OFFICE VISIT (OUTPATIENT)
Dept: OPHTHALMOLOGY | Facility: CLINIC | Age: 72
End: 2017-04-20
Payer: COMMERCIAL

## 2017-04-20 ENCOUNTER — INITIAL CONSULT (OUTPATIENT)
Dept: INTERNAL MEDICINE | Facility: CLINIC | Age: 72
End: 2017-04-20
Payer: COMMERCIAL

## 2017-04-20 ENCOUNTER — HOSPITAL ENCOUNTER (OUTPATIENT)
Dept: PREADMISSION TESTING | Facility: HOSPITAL | Age: 72
Discharge: HOME | End: 2017-04-20
Attending: ANESTHESIOLOGY
Payer: COMMERCIAL

## 2017-04-20 ENCOUNTER — HOSPITAL ENCOUNTER (OUTPATIENT)
Dept: CARDIOLOGY | Facility: CLINIC | Age: 72
Discharge: HOME | End: 2017-04-20
Payer: COMMERCIAL

## 2017-04-20 VITALS
HEIGHT: 63 IN | BODY MASS INDEX: 38.45 KG/M2 | SYSTOLIC BLOOD PRESSURE: 141 MMHG | WEIGHT: 217 LBS | TEMPERATURE: 98 F | HEART RATE: 77 BPM | DIASTOLIC BLOOD PRESSURE: 71 MMHG | RESPIRATION RATE: 16 BRPM | OXYGEN SATURATION: 100 %

## 2017-04-20 VITALS — WEIGHT: 217.63 LBS | HEIGHT: 63 IN | BODY MASS INDEX: 38.56 KG/M2

## 2017-04-20 DIAGNOSIS — I10 ESSENTIAL HYPERTENSION: ICD-10-CM

## 2017-04-20 DIAGNOSIS — N18.2 CKD (CHRONIC KIDNEY DISEASE) STAGE 2, GFR 60-89 ML/MIN: ICD-10-CM

## 2017-04-20 DIAGNOSIS — Z01.818 PREOP EXAMINATION: Primary | ICD-10-CM

## 2017-04-20 DIAGNOSIS — G47.33 OBSTRUCTIVE SLEEP APNEA: ICD-10-CM

## 2017-04-20 DIAGNOSIS — I49.9 CARDIAC ARRHYTHMIA, UNSPECIFIED CARDIAC ARRHYTHMIA TYPE: ICD-10-CM

## 2017-04-20 DIAGNOSIS — R60.9 EDEMA, UNSPECIFIED TYPE: ICD-10-CM

## 2017-04-20 DIAGNOSIS — E66.9 OBESITY (BMI 30-39.9): ICD-10-CM

## 2017-04-20 DIAGNOSIS — E11.9 TYPE 2 DIABETES MELLITUS NOT AT GOAL: ICD-10-CM

## 2017-04-20 DIAGNOSIS — H00.14 CHALAZION LEFT UPPER EYELID: ICD-10-CM

## 2017-04-20 DIAGNOSIS — Z98.890 POST-OPERATIVE STATE: Primary | ICD-10-CM

## 2017-04-20 PROCEDURE — 99024 POSTOP FOLLOW-UP VISIT: CPT | Mod: S$GLB,,, | Performed by: OPHTHALMOLOGY

## 2017-04-20 PROCEDURE — 93000 ELECTROCARDIOGRAM COMPLETE: CPT | Mod: S$GLB,,, | Performed by: INTERNAL MEDICINE

## 2017-04-20 PROCEDURE — 99244 OFF/OP CNSLTJ NEW/EST MOD 40: CPT | Mod: S$GLB,,, | Performed by: HOSPITALIST

## 2017-04-20 PROCEDURE — 99999 PR PBB SHADOW E&M-EST. PATIENT-LVL II: CPT | Mod: PBBFAC,,, | Performed by: OPHTHALMOLOGY

## 2017-04-20 PROCEDURE — 99999 PR PBB SHADOW E&M-EST. PATIENT-LVL II: CPT | Mod: PBBFAC,,, | Performed by: HOSPITALIST

## 2017-04-20 RX ORDER — NAPROXEN SODIUM 220 MG
440 TABLET ORAL
COMMUNITY
End: 2017-06-26

## 2017-04-20 RX ORDER — HYDROCHLOROTHIAZIDE 12.5 MG/1
12.5 TABLET ORAL DAILY
Qty: 30 TABLET | Refills: 1 | Status: SHIPPED | OUTPATIENT
Start: 2017-04-20 | End: 2017-06-30 | Stop reason: SDUPTHER

## 2017-04-20 NOTE — DISCHARGE INSTRUCTIONS
PreOp Instructions given:  - Written medication information (what to hold and what to take)  - NPO guidelines  - Arrival place directions given; time to be given the day before procedure by the              Surgeon's Office  - Bathing with antibacterial soap/Hibiclens   - Don't wear any jewelry or bring any valuables AM of surgery  - No makeup or moisturizer to face  - No perfume/cologne, powder, lotions or aftershave    Pt. verbalized understanding. Anesthesia: Regional Anesthesia  Youre scheduled for surgery. During surgery, youll receive medication called anesthesia to keep you comfortable and pain-free. Your surgeon has decided that youll receive regional anesthesia. This sheet tells you what to expect with this type of anesthesia.  What Is Regional Anesthesia?  Regional anesthesia numbs one region of your body. The anesthesia may be given around nerves or into veins in your arms, neck, or legs (nerve block or [place] block). Or it may be sent into the spinal fluid (spinal anesthesia) or into the space just outside the spinal fluid (epidural anesthesia). Sedatives may also be given to relax you.  Nerve Block or [place] Block  A small area of the body, such as an arm or leg, can be numbed using a nerve block or [place] block.  · Nerve block: During a nerve block, your skin is numbed. A needle is then inserted near nerves that serve the area to be numbed. Anesthetic is sent through the needle.  · IV regional or [place] block: For this type of block, an IV line is put into a vein. The blood flow to the area to be numbed is blocked for a short time. Anesthetic is sent through the IV.  Spinal Anesthesia  Spinal anesthesia numbs your body from about the waist down.  · Anesthetic is injected into the spinal fluid. This is a substance that surrounds the spinal cord in your spinal column. The anesthetic blocks pain traveling from the body to the brain.  · To receive the anesthetic, your skin is numbed at the injection site.  · A  needle is then inserted into the spinal fluid. Anesthetic is sent through the needle.  Anesthesia Tools and Medications  · Local anesthetic given through a needle numbs one region of your body.  · Electrocardiography leads (electrodes) record your heart rate and rhythm.  · A blood pressure cuff monitors your blood pressure.  · A pulse oximeter on the end of the finger measures your blood oxygen level.  · Sedatives may be given through an IV to relax you and keep you comfortable. You may stay awake or sleep slightly.  · Oxygen may be given to you through a facemask.  Risks and Possible Complications  Regional anesthesia carries some risks. These include:  · Nausea and vomiting  · Headache  · Backache  · Decreased blood pressure  · Allergic reaction to the anesthetic  · Ongoing numbness (rare)  · Irregular heartbeat (rare)  · Cardiac arrest (rare)   © 4485-7912 [facility], [address] [place] [address], [place] [address]. All rights reserved. This information is not intended as a substitute for professional medical care. Always follow your healthcare professional's instructions.

## 2017-04-20 NOTE — PRE-PROCEDURE INSTRUCTIONS
Your surgery has been scheduled for:__________________________________________    You should report to:  ____[facility], located on the [place] side of the first floor of the           [facility] ([phone])  ____[facility], located on the [facility] side of the            Second floor of the [facility] ([phone])  ____3rd Floor [facility] located on the [facility] side of the [facility] [phone]  Please Note   - Tell your doctor if you take Aspirin, products containing Aspirin, herbal medications  or blood thinners, such as Coumadin, Ticlid, or Plavix.  (Consult your provider regarding holding or stopping before surgery).  - Arrange for someone to drive you home following surgery.  You will not be allowed to leave the surgical facility alone or drive yourself home following sedation and anesthesia.  Before Surgery  - Stop taking all herbal medications 14days prior to surgery  - No Motrin/Advil (Ibuprofen) 7 days before surgery  - No Aleve (Naproxen) 7 days before surgery  - Stop Taking Asprin, products containing Asprin _____days before surgery  - Stop taking blood thinners_______days before surgery  - Refrain from drinking alcoholic beverages for 24hours before and after surgery  - Stop or limit smoking _________days before surgery  Night before Surgery  - DO NOT EAT OR DRINK ANYTHING AFTER MIDNIGHT, INCLUDING GUM, HARD CANDY, MINTS, OR CHEWING TOBACCO.  - Take a shower or bath (shower is recommended).  Bathe with Hibiclens soap or an antibacterial soap from the neck down.  If not supplied by your surgeon, hibiclens soap will need to be purchased over the counter in pharmacy.  Rinse soap off thoroughly.  - Shampoo your hair with your regular shampoo  The Day of Surgery  - Take another bath or shower with hibiclens or any antibacterial soap, to reduce the chance of infection.  - Take heart and blood  pressure medications with a small sip of water, as advised by the perioperative team.  - Do not take fluid pills  - You may brush your teeth and rinse your mouth, but do not swall any additional water.   - Do not apply perfumes, powder, body lotions or deodorant on the day of surgery.  - Nail polish should be removed.  - Do not wear makeup or moisturizer  - Wear comfortable clothes, such as a button front shirt and loose fitting pants.  - Leave all jewelry, including body piercings, and valuables at home.    - Bring any devices you will neeed after surgery such as crutches or canes.  - If you have sleep apnea, please bring your CPAP machine  In the event that your physical condition changes including the onset of a cold or respiratory illness, or if you have to delay or cancel your surgery, please notify your surgeon.

## 2017-04-20 NOTE — IP AVS SNAPSHOT
[facility]  [address]  [facility] [address]  Phone: [phone]           Patient Discharge Instructions  Our goal is to set you up for success. This packet includes information on your condition, medications, and your home care. It will help you care for yourself to prevent having to return to the hospital.     Please ask your nurse if you have any questions.      There are many details to remember when preparing for your surgery. Here is what you will need to do, please ask your nurse if there are more specific instructions and if you have any questions:    1. Before procedure Do not smoke or drink alcoholic beverages 24 hours prior to your procedure. Do not eat or drink anything 8 hours before your procedure - this includes gum, mints, and candy.     2. Day of procedure Please remove all jewelry for the procedure. If you wear contact lenses, dentures, hearing aids or glasses, bring a container to put them in during your surgery and give to a family member.  If your doctor has scheduled you for an overnight stay, bring a small overnight bag with any personal items that you need.      3. After procedure  Make arrangements in advance for transportation home by a responsible adult. It is not safe to drive a vehicle during the 24 hours following surgery.     PLEASE NOTE: You may be contacted the day before your surgery to confirm your surgery date and arrival time. The Surgery schedule has many variables which may affect the time of your surgery case. Family members should be available if your surgery time changes.           [facility]  Unless otherwise directed by your provider, please   contact [facility], our nurse care line   that is available for 24/7 assistance.     [phone] (toll-free)     Registered nurses in the [facility]   provide: appointment scheduling, clinical advisement, health education, and other advisory services.                   ** Verify the list of medication(s) below is accurate and up to date. Carry this with you in case of emergency. If your medications have changed, please notify your healthcare provider.             Medication List      TAKE these medications        Additional Info                      alcohol swabs Padm   Commonly known as:  ALCOHOL PADS   Quantity:  11 each   Refills:  11   Dose:  1 each    Instructions:  Apply 1 each topically as needed.     Begin Date    AM    Noon    PM    Bedtime       amlodipine 10 MG tablet   Commonly known as:  NORVASC   Quantity:  90 tablet   Refills:  3    Instructions:  take 1 tablet by mouth every morning     Begin Date    AM    Noon    PM    Bedtime       blood sugar diagnostic Strp   Commonly known as:  ONETOUCH VERIO   Quantity:  50 strip   Refills:  11   Dose:  1 strip    Instructions:  1 strip by Misc.(Non-Drug; Combo Route) route 2 (two) times daily.     Begin Date    AM    Noon    PM    Bedtime       cyanocobalamin 1000 MCG tablet   Commonly known as:  VITAMIN B-12   Refills:  0   Dose:  1000 mcg    Instructions:  Take 1,000 mcg by mouth once daily.     Begin Date    AM    Noon    PM    Bedtime       cyclobenzaprine 10 MG tablet   Commonly known as:  FLEXERIL   Refills:  0   Dose:  10 mg    Instructions:  Take 10 mg by mouth 3 (three) times daily as needed for Muscle spasms.     Begin Date    AM    Noon    PM    Bedtime       dulaglutide 0.75 mg/0.5 mL Pnij   Commonly known as:  TRULICITY   Quantity:  4 Syringe   Refills:  11   Dose:  0.75 mg    Instructions:  Inject 0.75 mg into the skin once a week.     Begin Date    AM    Noon    PM    Bedtime       * ibuprofen 800 MG tablet   Commonly known as:  ADVIL,MOTRIN   Refills:  0   Dose:  800 mg    Instructions:  Take 800 mg by mouth as needed.     Begin Date    AM    Noon    PM    Bedtime       * ibuprofen 800 MG tablet   Commonly known as:  ADVIL,MOTRIN   Quantity:  90 tablet   Refills:  2    Instructions:  take 1 tablet by  mouth every 8 hours if needed for pain     Begin Date    AM    Noon    PM    Bedtime       LOMOTIL 2.5-0.025 mg per tablet   Refills:  0   Generic drug:  diphenoxylate-atropine 2.5-0.025 mg    Instructions:  Take by mouth 3 (three) times daily as needed. 1 Tablet Oral Three times a day     Begin Date    AM    Noon    PM    Bedtime       metformin 500 MG tablet   Commonly known as:  GLUCOPHAGE   Quantity:  60 tablet   Refills:  11   Dose:  1000 mg   Indications:  type 2 diabetes mellitus    Instructions:  Take 2 tablets (1,000 mg total) by mouth daily with breakfast.     Begin Date    AM    Noon    PM    Bedtime       metoprolol succinate 25 MG 24 hr tablet   Commonly known as:  TOPROL-XL   Quantity:  90 tablet   Refills:  3   Dose:  25 mg    Instructions:  Take 1 tablet (25 mg total) by mouth every evening.     Begin Date    AM    Noon    PM    Bedtime       naproxen sodium 220 MG tablet   Commonly known as:  ANAPROX   Refills:  0   Dose:  440 mg   Indications:  Pain    Instructions:  Take 440 mg by mouth as needed.     Begin Date    AM    Noon    PM    Bedtime       omeprazole 40 MG capsule   Commonly known as:  PRILOSEC   Quantity:  90 capsule   Refills:  3   Dose:  40 mg    Instructions:  Take 1 capsule (40 mg total) by mouth once daily.     Begin Date    AM    Noon    PM    Bedtime       * ONETOUCH DELICA LANCETS 33 gauge Misc   Refills:  0   Generic drug:  lancets      Begin Date    AM    Noon    PM    Bedtime       * ACCU-CHEK FASTCLIX Misc   Refills:  0   Generic drug:  lancets      Begin Date    AM    Noon    PM    Bedtime       potassium chloride 10 MEQ Cpsr   Commonly known as:  MICRO-K   Quantity:  180 capsule   Refills:  3    Instructions:  take 1 capsule by mouth twice a day     Begin Date    AM    Noon    PM    Bedtime       valsartan 320 MG tablet   Commonly known as:  DIOVAN   Quantity:  90 tablet   Refills:  3    Instructions:  take 1 tablet by mouth once daily     Begin Date    AM    Noon    PM     Bedtime       * Notice:  This list has 4 medication(s) that are the same as other medications prescribed for you. Read the directions carefully, and ask your doctor or other care provider to review them with you.               Please bring to all follow up appointments:    1. A copy of your discharge instructions.  2. All medicines you are currently taking in their original bottles.  3. Identification and insurance card.    Please arrive 15 minutes ahead of scheduled appointment time.    Please call 24 hours in advance if you must reschedule your appointment and/or time.        Your Scheduled Appointments     Apr 20, 2017  3:15 PM CDT   EKG with EKG, APPT   [facility] - EKG ([facility] )    [address]  [place] [id number] [phone]            Apr 25, 2017 10:00 AM CDT   Pre OP with HARSHAD Avery - Orthopedics ([facility] )    [address]  [place] [id number] [phone]            Apr 25, 2017 11:20 AM CDT   Non-Fasting Lab with LAB, APPOINTMENT [place] [facility] ([facility])    [address] [facility] [id number] [phone]            Apr 25, 2017  1:30 PM CDT   Education Class with JOINT CAMP, [facility] - Orthopedics Class ([facility] )    [address]  [place] [id number] [phone]            May 16, 2017  1:00 PM CDT   Post OP with HARSHAD Avery robles - Orthopedics ([facility] )    [address]  [place] [id number] [phone]              Your Future Surgeries/Procedures     May 01, 2017   Surgery with [facility] MD   [facility] ([facility])    [address] [facility] [id number] [phone]                  Discharge Instructions       PreOp Instructions given:  - Written medication information (what to hold and what to  take)  - NPO guidelines  - Arrival place directions given; time to be given the day before procedure by the              Surgeon's Office  - Bathing with antibacterial soap/Hibiclens   - Don't wear any jewelry or bring any valuables AM of surgery  - No makeup or moisturizer to face  - No perfume/cologne, powder, lotions or aftershave    Pt. verbalized understanding. Anesthesia: Regional Anesthesia  Youre scheduled for surgery. During surgery, youll receive medication called anesthesia to keep you comfortable and pain-free. Your surgeon has decided that youll receive regional anesthesia. This sheet tells you what to expect with this type of anesthesia.  What Is Regional Anesthesia?  Regional anesthesia numbs one region of your body. The anesthesia may be given around nerves or into veins in your arms, neck, or legs (nerve block or Olamide block). Or it may be sent into the spinal fluid (spinal anesthesia) or into the space just outside the spinal fluid (epidural anesthesia). Sedatives may also be given to relax you.  Nerve Block or Inchelium Block  A small area of the body, such as an arm or leg, can be numbed using a nerve block or Olamide block.  · Nerve block: During a nerve block, your skin is numbed. A needle is then inserted near nerves that serve the area to be numbed. Anesthetic is sent through the needle.  · IV regional or Inchelium block: For this type of block, an IV line is put into a vein. The blood flow to the area to be numbed is blocked for a short time. Anesthetic is sent through the IV.  Spinal Anesthesia  Spinal anesthesia numbs your body from about the waist down.  · Anesthetic is injected into the spinal fluid. This is a substance that surrounds the spinal cord in your spinal column. The anesthetic blocks pain traveling from the body to the brain.  · To receive the anesthetic, your skin is numbed at the injection site.  · A needle is then inserted into the spinal fluid. Anesthetic is sent through the  "needle.  Anesthesia Tools and Medications  · Local anesthetic given through a needle numbs one region of your body.  · Electrocardiography leads (electrodes) record your heart rate and rhythm.  · A blood pressure cuff monitors your blood pressure.  · A pulse oximeter on the end of the finger measures your blood oxygen level.  · Sedatives may be given through an IV to relax you and keep you comfortable. You may stay awake or sleep slightly.  · Oxygen may be given to you through a facemask.  Risks and Possible Complications  Regional anesthesia carries some risks. These include:  · Nausea and vomiting  · Headache  · Backache  · Decreased blood pressure  · Allergic reaction to the anesthetic  · Ongoing numbness (rare)  · Irregular heartbeat (rare)  · Cardiac arrest (rare)   © 7324-3193 [facility], [address] [place] [address], [place] [address]. All rights reserved. This information is not intended as a substitute for professional medical care. Always follow your healthcare professional's instructions.      Admission Information     Date & Time Provider Department CSN    4/20/2017  1:00 PM Rhonda G Leopold, MD [facility] [id number]      Care Providers     Provider Role Specialty Primary office phone    Rhonda G Leopold, MD Attending Provider Anesthesiology [phone]      Your Vitals Were     BP Pulse Temp Resp Height Weight    141/71 77 98.4 °F (36.9 °C) 16 5' 2.5" (1.588 m) 98.4 kg (217 lb)    SpO2 BMI             100% 39.06 kg/m2         Recent Lab Values        5/22/2014 8/18/2014 11/20/2014 8/24/2015 5/4/2016 12/30/2016 1/6/2017 3/21/2017     12:10 PM 12:45 PM 11:40 AM  6:30 PM  5:00 PM  7:26 AM 11:23 AM  1:00 PM    A1C 6.9 (H) 7.0 (H) 6.5 (H) 7.8 (H) 12.0 (H) 8.7 (H) 8.8 (H) 7.5 (H)      7.0 (H)          Comment for A1C at  7:26 AM on 12/30/2016:  According to ADA guidelines, hemoglobin A1C <7.0% represents  optimal control in non-pregnant diabetic patients.  Different  metrics may apply to " specific populations.   Standards of Medical Care in Diabetes - 2016.  For the purpose of screening for the presence of diabetes:  <5.7%     Consistent with the absence of diabetes  5.7-6.4%  Consistent with increasing risk for diabetes   (prediabetes)  >or=6.5%  Consistent with diabetes  Currently no consensus exists for use of hemoglobin A1C  for diagnosis of diabetes for children.      Comment for A1C at 11:23 AM on 1/6/2017:  According to ADA guidelines, hemoglobin A1C <7.0% represents  optimal control in non-pregnant diabetic patients.  Different  metrics may apply to specific populations.   Standards of Medical Care in Diabetes - 2016.  For the purpose of screening for the presence of diabetes:  <5.7%     Consistent with the absence of diabetes  5.7-6.4%  Consistent with increasing risk for diabetes   (prediabetes)  >or=6.5%  Consistent with diabetes  Currently no consensus exists for use of hemoglobin A1C  for diagnosis of diabetes for children.      Comment for A1C at  1:00 PM on 3/21/2017:  According to ADA guidelines, hemoglobin A1C <7.0% represents  optimal control in non-pregnant diabetic patients.  Different  metrics may apply to specific populations.   Standards of Medical Care in Diabetes - 2016.  For the purpose of screening for the presence of diabetes:  <5.7%     Consistent with the absence of diabetes  5.7-6.4%  Consistent with increasing risk for diabetes   (prediabetes)  >or=6.5%  Consistent with diabetes  Currently no consensus exists for use of hemoglobin A1C  for diagnosis of diabetes for children.        Allergies as of 4/20/2017        Reactions    Keflex [Cephalexin] Other (See Comments)    Turns orange    Bactrim [Sulfamethoxazole-trimethoprim]     Generic version  Sulfa makes her sick      Advance Directives     An advance directive is a document which, in the event you are no longer able to make decisions for yourself, tells your healthcare team what kind of treatment you do or do not  want to receive, or who you would like to make those decisions for you.  If you do not currently have an advance directive, [facility] encourages you to create one.  For more information call:  [phone]), [phone] ([phone]),  or log on to [email or web address].        Language Assistance Services     ATTENTION: Language assistance services are available, free of charge. Please call [phone].      ATENCIÓN: Si habla español, tiene a lopez disposición servicios gratuitos de asistencia lingüística. Llame al [phone].     CHÚ Ý: N?u b?n nói Ti?ng Vi?t, có các d?ch v? h? tr? ngôn ng? mi?n phí dành cho b?n. G?i s? [phone].        Chronic Kindey Disease Education             Diabetes Discharge Instructions                                    [facility] complies with applicable Federal civil rights laws and does not discriminate on the basis of race, color, national origin, age, disability, or sex.

## 2017-04-20 NOTE — PROGRESS NOTES
"HPI     DLS" 3/29/17 S/P Chalazion removal AGUSTIN    Pt states no pain or VA change since procedure.  The lids are even and   open the same height.  The bump is gone.         Last edited by Ericka Hernandez on 4/20/2017 10:51 AM.         Assessment /Plan     For exam results, see Encounter Report.    Post-operative state    Chalazion left upper eyelid      Looks great!  Return prn sooner any worsening.                   "
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
## 2017-04-20 NOTE — LETTER
April 20, 2017      [facility]  [address] [address]           [facility] - Pre Op Consult  [address] [address]  Phone: [phone]          Patient: Kaylee Romero   MR Number: [id number]   YOB: 1945   Date of Visit: 4/20/2017       Dear Dr. [facility].:    Thank you for referring Kaylee Romero to me for evaluation. Attached you will find relevant portions of my assessment and plan of care.    If you have questions, please do not hesitate to call me. I look forward to following Kaylee Romero along with you.    Sincerely,    Caitlyn Savage MD    Enclosure  CC:  Lexy Nicole MD    If you would like to receive this communication electronically, please contact [email or web address] or [phone] to request more information on [employer] Link access.    For providers and/or their staff who would like to refer a patient to [facility], please contact us through our one-stop-shop provider referral line, [facility], at [phone].    If you feel you have received this communication in error or would no longer like to receive these types of communications, please e-mail [email or web address]

## 2017-04-25 ENCOUNTER — HOSPITAL ENCOUNTER (OUTPATIENT)
Dept: RADIOLOGY | Facility: HOSPITAL | Age: 72
Discharge: HOME | End: 2017-04-25
Attending: ORTHOPAEDIC SURGERY
Payer: COMMERCIAL

## 2017-04-25 ENCOUNTER — OFFICE VISIT (OUTPATIENT)
Dept: ORTHOPEDICS | Facility: CLINIC | Age: 72
End: 2017-04-25
Payer: COMMERCIAL

## 2017-04-25 VITALS
DIASTOLIC BLOOD PRESSURE: 74 MMHG | TEMPERATURE: 98 F | BODY MASS INDEX: 39.93 KG/M2 | SYSTOLIC BLOOD PRESSURE: 136 MMHG | HEIGHT: 62 IN | HEART RATE: 73 BPM | WEIGHT: 217 LBS

## 2017-04-25 DIAGNOSIS — G89.29 CHRONIC PAIN OF LEFT KNEE: ICD-10-CM

## 2017-04-25 DIAGNOSIS — M25.562 CHRONIC PAIN OF LEFT KNEE: ICD-10-CM

## 2017-04-25 DIAGNOSIS — M17.12 PRIMARY OSTEOARTHRITIS OF LEFT KNEE: Primary | ICD-10-CM

## 2017-04-25 PROCEDURE — 73560 X-RAY EXAM OF KNEE 1 OR 2: CPT | Mod: TC,LT

## 2017-04-25 PROCEDURE — 99999 PR PBB SHADOW E&M-EST. PATIENT-LVL V: CPT | Mod: PBBFAC,,, | Performed by: PHYSICIAN ASSISTANT

## 2017-04-25 PROCEDURE — 99499 UNLISTED E&M SERVICE: CPT | Mod: S$GLB,,, | Performed by: PHYSICIAN ASSISTANT

## 2017-04-25 PROCEDURE — 73560 X-RAY EXAM OF KNEE 1 OR 2: CPT | Mod: 26,LT,, | Performed by: RADIOLOGY

## 2017-04-25 RX ORDER — AMOXICILLIN 250 MG
1 CAPSULE ORAL 2 TIMES DAILY
Status: CANCELLED | OUTPATIENT
Start: 2017-04-25

## 2017-04-25 RX ORDER — MUPIROCIN 20 MG/G
1 OINTMENT TOPICAL
Status: CANCELLED | OUTPATIENT
Start: 2017-04-25

## 2017-04-25 RX ORDER — ASPIRIN 325 MG
325 TABLET, DELAYED RELEASE (ENTERIC COATED) ORAL 2 TIMES DAILY
Status: CANCELLED | OUTPATIENT
Start: 2017-04-25

## 2017-04-25 RX ORDER — POLYETHYLENE GLYCOL 3350 17 G/17G
17 POWDER, FOR SOLUTION ORAL DAILY
Status: CANCELLED | OUTPATIENT
Start: 2017-04-25

## 2017-04-25 RX ORDER — SODIUM CHLORIDE 9 MG/ML
INJECTION, SOLUTION INTRAVENOUS CONTINUOUS
Status: CANCELLED | OUTPATIENT
Start: 2017-04-25 | End: 2017-04-26

## 2017-04-25 RX ORDER — OXYCODONE HYDROCHLORIDE 5 MG/1
10 TABLET ORAL
Status: CANCELLED | OUTPATIENT
Start: 2017-04-25

## 2017-04-25 RX ORDER — ONDANSETRON 2 MG/ML
4 INJECTION INTRAMUSCULAR; INTRAVENOUS EVERY 12 HOURS PRN
Status: CANCELLED | OUTPATIENT
Start: 2017-04-25

## 2017-04-25 RX ORDER — OXYCODONE HYDROCHLORIDE 5 MG/1
5 TABLET ORAL
Status: CANCELLED | OUTPATIENT
Start: 2017-04-25

## 2017-04-25 RX ORDER — MORPHINE SULFATE 10 MG/ML
2 INJECTION, SOLUTION INTRAMUSCULAR; INTRAVENOUS
Status: CANCELLED | OUTPATIENT
Start: 2017-04-25

## 2017-04-25 RX ORDER — SODIUM CHLORIDE 9 MG/ML
INJECTION, SOLUTION INTRAVENOUS
Status: CANCELLED | OUTPATIENT
Start: 2017-04-25

## 2017-04-25 RX ORDER — FAMOTIDINE 20 MG/1
20 TABLET, FILM COATED ORAL 2 TIMES DAILY
Status: CANCELLED | OUTPATIENT
Start: 2017-04-25

## 2017-04-25 RX ORDER — RAMELTEON 8 MG/1
8 TABLET ORAL NIGHTLY PRN
Status: CANCELLED | OUTPATIENT
Start: 2017-04-25

## 2017-04-25 RX ORDER — ACETAMINOPHEN 500 MG
1000 TABLET ORAL EVERY 6 HOURS
Status: CANCELLED | OUTPATIENT
Start: 2017-04-25 | End: 2017-04-27

## 2017-04-25 RX ORDER — ROPIVACAINE HYDROCHLORIDE 2 MG/ML
8 INJECTION, SOLUTION EPIDURAL; INFILTRATION; PERINEURAL CONTINUOUS
Status: CANCELLED | OUTPATIENT
Start: 2017-04-25

## 2017-04-25 RX ORDER — LIDOCAINE HYDROCHLORIDE 10 MG/ML
1 INJECTION, SOLUTION EPIDURAL; INFILTRATION; INTRACAUDAL; PERINEURAL
Status: CANCELLED | OUTPATIENT
Start: 2017-04-25

## 2017-04-25 RX ORDER — NALOXONE HCL 0.4 MG/ML
0.02 VIAL (ML) INJECTION
Status: CANCELLED | OUTPATIENT
Start: 2017-04-25

## 2017-04-25 RX ORDER — MIDAZOLAM HYDROCHLORIDE 5 MG/ML
1 INJECTION INTRAMUSCULAR; INTRAVENOUS EVERY 5 MIN PRN
Status: CANCELLED | OUTPATIENT
Start: 2017-04-25

## 2017-04-25 RX ORDER — PREGABALIN 25 MG/1
75 CAPSULE ORAL
Status: CANCELLED | OUTPATIENT
Start: 2017-04-25

## 2017-04-25 RX ORDER — BISACODYL 10 MG
10 SUPPOSITORY, RECTAL RECTAL EVERY 12 HOURS PRN
Status: CANCELLED | OUTPATIENT
Start: 2017-04-25

## 2017-04-25 RX ORDER — PREGABALIN 25 MG/1
75 CAPSULE ORAL NIGHTLY
Status: CANCELLED | OUTPATIENT
Start: 2017-04-25

## 2017-04-25 RX ORDER — SODIUM CHLORIDE 0.9 % (FLUSH) 0.9 %
3 SYRINGE (ML) INJECTION EVERY 8 HOURS PRN
Status: CANCELLED | OUTPATIENT
Start: 2017-04-25

## 2017-04-25 RX ORDER — CELECOXIB 100 MG/1
200 CAPSULE ORAL DAILY
Status: CANCELLED | OUTPATIENT
Start: 2017-04-25

## 2017-04-25 RX ORDER — CELECOXIB 100 MG/1
400 CAPSULE ORAL ONCE
Status: CANCELLED | OUTPATIENT
Start: 2017-04-25 | End: 2017-04-25

## 2017-04-25 RX ORDER — FENTANYL CITRATE 50 UG/ML
25 INJECTION, SOLUTION INTRAMUSCULAR; INTRAVENOUS EVERY 5 MIN PRN
Status: CANCELLED | OUTPATIENT
Start: 2017-04-25

## 2017-04-25 RX ORDER — ACETAMINOPHEN 10 MG/ML
1000 INJECTION, SOLUTION INTRAVENOUS ONCE
Status: CANCELLED | OUTPATIENT
Start: 2017-04-25 | End: 2017-04-25

## 2017-04-25 NOTE — H&P
CC: Left knee pain    Kaylee Romero is a 72 y.o. female who has had pain in Left knee for several years. Pain is worse with activity and weight bearing.  Patient has experienced interference of activities of daily living due to decreased range of motion and an increase in joint pain and swelling.  Patient has failed non-operative treatment including NSAIDs, corticosteroid injections, viscosupplement injections, and activity modification. Physical therapy was contraindicated in this patient due to pain and potential bone loss on this severe arthritic joint. Kaylee Romero currently ambulates using assistive device or scooter.     Past Medical History:   Diagnosis Date    Acid reflux     Arthritis     Cataract     Diabetes mellitus, type 2     Dry mouth     Hyperlipidemia 2014    Hypertension     KAMRAN (obstructive sleep apnea)        Past Surgical History:   Procedure Laterality Date    ankle surgery (l)      APPENDECTOMY       SECTION      DILATION AND CURETTAGE OF UTERUS      KNEE ARTHROSCOPY W/ DEBRIDEMENT         Family History   Problem Relation Age of Onset    Heart disease Mother     Heart disease Father     Stroke Father     No Known Problems Brother     No Known Problems Son     Stroke Paternal Grandfather     No Known Problems Sister     No Known Problems Maternal Aunt     No Known Problems Maternal Uncle     No Known Problems Paternal Aunt     No Known Problems Paternal Uncle     No Known Problems Maternal Grandmother     No Known Problems Maternal Grandfather     No Known Problems Paternal Grandmother     Breast cancer Neg Hx     Colon cancer Neg Hx     Ovarian cancer Neg Hx     Amblyopia Neg Hx     Blindness Neg Hx     Cancer Neg Hx     Cataracts Neg Hx     Diabetes Neg Hx     Glaucoma Neg Hx     Hypertension Neg Hx     Macular degeneration Neg Hx     Retinal detachment Neg Hx     Strabismus Neg Hx     Thyroid disease Neg Hx        Review of  patient's allergies indicates:   Allergen Reactions    Keflex [cephalexin] Other (See Comments)     Turns orange    Bactrim [sulfamethoxazole-trimethoprim]      Generic version  Sulfa makes her sick         Current Outpatient Prescriptions:     ACCU-CHEK FASTCLIX Misc, , Disp: , Rfl:     alcohol swabs (ALCOHOL PADS) PadM, Apply 1 each topically as needed., Disp: 11 each, Rfl: 11    amlodipine (NORVASC) 10 MG tablet, take 1 tablet by mouth every morning, Disp: 90 tablet, Rfl: 3    blood sugar diagnostic (ONETOUCH VERIO) Strp, 1 strip by Misc.(Non-Drug; Combo Route) route 2 (two) times daily., Disp: 50 strip, Rfl: 11    cyanocobalamin (VITAMIN B-12) 1000 MCG tablet, Take 1,000 mcg by mouth once daily., Disp: , Rfl: 0    cyclobenzaprine (FLEXERIL) 10 MG tablet, Take 10 mg by mouth 3 (three) times daily as needed for Muscle spasms., Disp: , Rfl:     diphenoxylate-atropine 2.5-0.025 mg (LOMOTIL) 2.5-0.025 mg per tablet, Take by mouth 3 (three) times daily as needed. 1 Tablet Oral Three times a day, Disp: , Rfl:     dulaglutide (TRULICITY) 0.75 mg/0.5 mL PnIj, Inject 0.75 mg into the skin once a week. (Patient taking differently: Inject 0.75 mg into the skin once a week. Sunday), Disp: 4 Syringe, Rfl: 11    hydrochlorothiazide (HYDRODIURIL) 12.5 MG Tab, Take 1 tablet (12.5 mg total) by mouth once daily., Disp: 30 tablet, Rfl: 1    ibuprofen (ADVIL,MOTRIN) 800 MG tablet, Take 800 mg by mouth as needed. , Disp: , Rfl: 0    ibuprofen (ADVIL,MOTRIN) 800 MG tablet, take 1 tablet by mouth every 8 hours if needed for pain, Disp: 90 tablet, Rfl: 2    metformin (GLUCOPHAGE) 500 MG tablet, Take 2 tablets (1,000 mg total) by mouth daily with breakfast., Disp: 60 tablet, Rfl: 11    metoprolol succinate (TOPROL-XL) 25 MG 24 hr tablet, Take 1 tablet (25 mg total) by mouth every evening. (Patient taking differently: Take 25 mg by mouth once daily. ), Disp: 90 tablet, Rfl: 3    naproxen sodium (ANAPROX) 220 MG tablet,  "Take 440 mg by mouth as needed., Disp: , Rfl:     omeprazole (PRILOSEC) 40 MG capsule, Take 1 capsule (40 mg total) by mouth once daily., Disp: 90 capsule, Rfl: 3    ONE TOUCH DELICA LANCETS 33 gauge Misc, , Disp: , Rfl:     potassium chloride (MICRO-K) 10 MEQ CpSR, take 1 capsule by mouth twice a day, Disp: 180 capsule, Rfl: 3    valsartan (DIOVAN) 320 MG tablet, take 1 tablet by mouth once daily, Disp: 90 tablet, Rfl: 3    Review of Systems:  Constitutional: no fever or chills  Eyes: no visual changes  ENT: no nasal congestion or sore throat  Respiratory: no cough or shortness of breath  Cardiovascular: no chest pain or palpitations  Gastrointestinal: no nausea or vomiting, tolerating diet  Genitourinary: no hematuria or dysuria  Integument/Breast: no rash or pruritis  Hematologic/Lymphatic: no easy bruising or lymphadenopathy  Musculoskeletal: positive for knee pain  Neurological: no seizures or tremors  Behavioral/Psych: no auditory or visual hallucinations  Endocrine: no heat or cold intolerance    PE:  /74 (BP Location: Left arm, Patient Position: Sitting, BP Method: Automatic)  Pulse 73  Temp 97.8 °F (36.6 °C)  Ht 5' 2" (1.575 m)  Wt 98.4 kg (217 lb)  BMI 39.69 kg/m2  General: Pleasant, cooperative, NAD   Gait: antalgic  HEENT: NCAT, sclera nonicteric   Lungs: Respirations clear bilaterally; equal and unlabored.   CV: S1S2; 2+ bilateral upper and lower extremity pulses.   Skin: Intact throughout with no rashes, erythema, or lesions  Extremities: No LE edema,  no erythema or warmth of the skin in either lower extremity.    Left knee exam:  Knee Range of Motion: active, pain with passive range of motion  Effusion:none  Condition of skin:intact  Location of tenderness:Medial joint line   Strength:5 of 5 quadriceps strength and 54 of 5 hamstring strength  Stability: stable to testing    Hip Examination:full painless range of motion, without tenderness    Radiographs: Radiographs reveal " advanced degenerative changes including subchondral cyst formation, subchondral sclerosis, osteophyte formation, joint space narrowing.     Knee Alignment:  Mild varus    Diagnosis: osteoarthritis Left knee    Plan: Left total knee arthroplasty    Due to the serious nature of total joint infection and the high prevalence of community acquired MRSA, vancomycin will be used perioperatively.

## 2017-04-25 NOTE — PROGRESS NOTES
Kaylee Romero is a 72 y.o. year old here today for a pre-operative visit in preparation for a Left total knee arthroplasty to be performed by  Dr. [facility] on 5/1/2017.  she was last seen and treated in the clinic on 1/3/2017. she will be medically optimized by the pre op center. There has been no significant change in medical status since last visit. No fever, chills, malaise, or unexplained weight change.      Allergies, Medications, past medical and surgical history reviewed.    Focused examination performed.    Dr. [facility] saw this patient today in clinic. All questions answered. Patient encouraged to call with questions. Contact information given.     Pre, christin, and post operative procedures and expectations discussed. Questions were answered. Kaylee Romero has been educated and is ready to proceed with surgery. Approximately 30 minutes was spent discussing surgical outcomes, plans, procedures pre, christin, and post operative expections and care.  Surgical consent signed.    Kaylee Romero will contact us if there are any questions, concerns, or changes in medical status prior to surgery.

## 2017-04-25 NOTE — MR AVS SNAPSHOT
[facility]  [address]  [place] [address]  Phone: [phone]                  Kaylee Romero   2017 10:00 AM   Appointment    Description:  Female : 1945   Provider:  Rachel Murdock PA-C   Department:  [facility]                To Do List           Future Appointments        Provider Department Dept Phone    2017 10:00 AM Rachel Murdock PA-C [facility] [phone]    2017 11:20 AM LAB, APPOINTMENT [place] [facility] [phone]    2017 1:30 PM [place] [phone]    2017 1:00 PM Rachel Murdock PA-C [facility] [phone]    2017 1:00 PM [place], [place] [facility] [phone]      Your Future Surgeries/Procedures     May 01, 2017   Surgery with [facility] MD   [facility]    [address] [facility] [id number] [phone]              Goals (5 Years of Data)     None      [facility]     [facility] Nurse Care Line -  Assistance  Unless otherwise directed by your provider, please contact [facility], our nurse care line that is available for  assistance.     Registered nurses in the [facility] provide: appointment scheduling, clinical advisement, health education, and other advisory services.  Call: [phone] (toll free)               Medications           Message regarding Medications     Verify the changes and/or additions to your medication regime listed below are the same as discussed with your clinician today.  If any of these changes or additions are incorrect, please notify your healthcare provider.             Verify that the below list of medications is an accurate representation of the medications you are currently taking.  If none reported, the list may be blank. If incorrect,  please contact your healthcare provider. Carry this list with you in case of emergency.           Current Medications     ACCU-CHEK FASTCLIX [facility]     alcohol swabs (ALCOHOL PADS) PadM Apply 1 each topically as needed.    amlodipine (NORVASC) 10 MG tablet take 1 tablet by mouth every morning    blood sugar diagnostic (ONETOUCH VERIO) Strp 1 strip by Misc.(Non-Drug; Combo Route) route 2 (two) times daily.    cyanocobalamin (VITAMIN B-12) 1000 MCG tablet Take 1,000 mcg by mouth once daily.    cyclobenzaprine (FLEXERIL) 10 MG tablet Take 10 mg by mouth 3 (three) times daily as needed for Muscle spasms.    diphenoxylate-atropine 2.5-0.025 mg (LOMOTIL) 2.5-0.025 mg per tablet Take by mouth 3 (three) times daily as needed. 1 Tablet Oral Three times a day    dulaglutide (TRULICITY) 0.75 mg/0.5 mL PnIj Inject 0.75 mg into the skin once a week.    hydrochlorothiazide (HYDRODIURIL) 12.5 MG Tab Take 1 tablet (12.5 mg total) by mouth once daily.    ibuprofen (ADVIL,MOTRIN) 800 MG tablet Take 800 mg by mouth as needed.     ibuprofen (ADVIL,MOTRIN) 800 MG tablet take 1 tablet by mouth every 8 hours if needed for pain    metformin (GLUCOPHAGE) 500 MG tablet Take 2 tablets (1,000 mg total) by mouth daily with breakfast.    metoprolol succinate (TOPROL-XL) 25 MG 24 hr tablet Take 1 tablet (25 mg total) by mouth every evening.    naproxen sodium (ANAPROX) 220 MG tablet Take 440 mg by mouth as needed.    omeprazole (PRILOSEC) 40 MG capsule Take 1 capsule (40 mg total) by mouth once daily.    ONE TOUCH DELICA LANCETS 33 gauge Misc     potassium chloride (MICRO-K) 10 MEQ CpSR take 1 capsule by mouth twice a day    valsartan (DIOVAN) 320 MG tablet take 1 tablet by mouth once daily           Clinical Reference Information           Allergies as of 4/25/2017     Keflex [Cephalexin]    Bactrim [Sulfamethoxazole-trimethoprim]      Immunizations Administered on Date of Encounter - 4/25/2017     None      Language Assistance Services      ATTENTION: Language assistance services are available, free of charge. Please call [phone].      ATENCIÓN: Si habla susanañol, tiene a lopez disposición servicios gratuitos de asistencia lingüística. Llame al [phone].     CHÚ Ý: N?u b?n nói Ti?ng Vi?t, có các d?ch v? h? tr? ngôn ng? mi?n phí dành cho b?n. G?i s? [phone].         [facility] complies with applicable Federal civil rights laws and does not discriminate on the basis of race, color, national origin, age, disability, or sex.

## 2017-04-27 ENCOUNTER — PATIENT MESSAGE (OUTPATIENT)
Dept: INTERNAL MEDICINE | Facility: CLINIC | Age: 72
End: 2017-04-27

## 2017-04-28 ENCOUNTER — TELEPHONE (OUTPATIENT)
Dept: ORTHOPEDICS | Facility: CLINIC | Age: 72
End: 2017-04-28

## 2017-04-28 ENCOUNTER — PATIENT MESSAGE (OUTPATIENT)
Dept: INTERNAL MEDICINE | Facility: CLINIC | Age: 72
End: 2017-04-28

## 2017-04-28 RX ORDER — DEXTROAMPHETAMINE SACCHARATE, AMPHETAMINE ASPARTATE, DEXTROAMPHETAMINE SULFATE AND AMPHETAMINE SULFATE 2.5; 2.5; 2.5; 2.5 MG/1; MG/1; MG/1; MG/1
TABLET ORAL
Refills: 0 | COMMUNITY
Start: 2017-04-24 | End: 2017-06-28

## 2017-04-28 NOTE — TELEPHONE ENCOUNTER
Spoke w   Mrs Romero teaching  : Reminded to eat light the night before surgery, NPO after midnight, take hibclens shower the night before surgery  & again in the am , sleep on clean sheets  in clean clothes, no laxatives or stool softeners , report to the 2nd floor surgery unit for 5 am Monday  He voiced understanding

## 2017-05-01 ENCOUNTER — ANESTHESIA (OUTPATIENT)
Dept: SURGERY | Facility: HOSPITAL | Age: 72
DRG: 470 | End: 2017-05-01
Payer: COMMERCIAL

## 2017-05-01 ENCOUNTER — HOSPITAL ENCOUNTER (INPATIENT)
Facility: HOSPITAL | Age: 72
LOS: 2 days | Discharge: HOME HEALTH | DRG: 470 | End: 2017-05-03
Attending: ORTHOPAEDIC SURGERY | Admitting: ORTHOPAEDIC SURGERY
Payer: COMMERCIAL

## 2017-05-01 DIAGNOSIS — M17.12 PRIMARY OSTEOARTHRITIS OF LEFT KNEE: ICD-10-CM

## 2017-05-01 LAB
ANION GAP SERPL CALC-SCNC: 7 MMOL/L
BUN SERPL-MCNC: 18 MG/DL
CALCIUM SERPL-MCNC: 8.7 MG/DL
CHLORIDE SERPL-SCNC: 105 MMOL/L
CO2 SERPL-SCNC: 25 MMOL/L
CREAT SERPL-MCNC: 0.9 MG/DL
EST. GFR  (AFRICAN AMERICAN): >60 ML/MIN/1.73 M^2
EST. GFR  (NON AFRICAN AMERICAN): >60 ML/MIN/1.73 M^2
GLUCOSE SERPL-MCNC: 197 MG/DL
POCT GLUCOSE: 185 MG/DL (ref 70–110)
POCT GLUCOSE: 213 MG/DL (ref 70–110)
POCT GLUCOSE: 218 MG/DL (ref 70–110)
POCT GLUCOSE: 227 MG/DL (ref 70–110)
POCT GLUCOSE: 235 MG/DL (ref 70–110)
POTASSIUM SERPL-SCNC: 4.3 MMOL/L
SODIUM SERPL-SCNC: 137 MMOL/L

## 2017-05-01 PROCEDURE — 25000003 PHARM REV CODE 250: Performed by: ANESTHESIOLOGY

## 2017-05-01 PROCEDURE — 71000039 HC RECOVERY, EACH ADD'L HOUR: Performed by: ORTHOPAEDIC SURGERY

## 2017-05-01 PROCEDURE — 64448 NJX AA&/STRD FEM NRV NFS IMG: CPT | Mod: 59,LT,, | Performed by: ANESTHESIOLOGY

## 2017-05-01 PROCEDURE — 36415 COLL VENOUS BLD VENIPUNCTURE: CPT

## 2017-05-01 PROCEDURE — 27200688 HC TRAY, SPINAL-HYPER/ ISOBARIC: Performed by: ANESTHESIOLOGY

## 2017-05-01 PROCEDURE — 76942 ECHO GUIDE FOR BIOPSY: CPT | Performed by: ANESTHESIOLOGY

## 2017-05-01 PROCEDURE — 37000008 HC ANESTHESIA 1ST 15 MINUTES: Performed by: ORTHOPAEDIC SURGERY

## 2017-05-01 PROCEDURE — 25000003 PHARM REV CODE 250: Performed by: PHYSICIAN ASSISTANT

## 2017-05-01 PROCEDURE — 88305 TISSUE EXAM BY PATHOLOGIST: CPT

## 2017-05-01 PROCEDURE — 27201423 OPTIME MED/SURG SUP & DEVICES STERILE SUPPLY: Performed by: ORTHOPAEDIC SURGERY

## 2017-05-01 PROCEDURE — 25000003 PHARM REV CODE 250: Performed by: NURSE ANESTHETIST, CERTIFIED REGISTERED

## 2017-05-01 PROCEDURE — 25000003 PHARM REV CODE 250: Performed by: STUDENT IN AN ORGANIZED HEALTH CARE EDUCATION/TRAINING PROGRAM

## 2017-05-01 PROCEDURE — 63600175 PHARM REV CODE 636 W HCPCS: Performed by: PHYSICIAN ASSISTANT

## 2017-05-01 PROCEDURE — 63600175 PHARM REV CODE 636 W HCPCS: Performed by: NURSE ANESTHETIST, CERTIFIED REGISTERED

## 2017-05-01 PROCEDURE — 25000003 PHARM REV CODE 250: Performed by: ORTHOPAEDIC SURGERY

## 2017-05-01 PROCEDURE — 63600175 PHARM REV CODE 636 W HCPCS: Performed by: ANESTHESIOLOGY

## 2017-05-01 PROCEDURE — 63600175 PHARM REV CODE 636 W HCPCS

## 2017-05-01 PROCEDURE — 97530 THERAPEUTIC ACTIVITIES: CPT

## 2017-05-01 PROCEDURE — 88305 TISSUE EXAM BY PATHOLOGIST: CPT | Mod: 26,,,

## 2017-05-01 PROCEDURE — 97162 PT EVAL MOD COMPLEX 30 MIN: CPT

## 2017-05-01 PROCEDURE — 37000009 HC ANESTHESIA EA ADD 15 MINS: Performed by: ORTHOPAEDIC SURGERY

## 2017-05-01 PROCEDURE — 97166 OT EVAL MOD COMPLEX 45 MIN: CPT

## 2017-05-01 PROCEDURE — 11000001 HC ACUTE MED/SURG PRIVATE ROOM

## 2017-05-01 PROCEDURE — 0SRD0JZ REPLACEMENT OF LEFT KNEE JOINT WITH SYNTHETIC SUBSTITUTE, OPEN APPROACH: ICD-10-PCS | Performed by: ORTHOPAEDIC SURGERY

## 2017-05-01 PROCEDURE — 99900035 HC TECH TIME PER 15 MIN (STAT)

## 2017-05-01 PROCEDURE — 64450 NJX AA&/STRD OTHER PN/BRANCH: CPT | Mod: 59,LT,, | Performed by: ANESTHESIOLOGY

## 2017-05-01 PROCEDURE — 27447 TOTAL KNEE ARTHROPLASTY: CPT | Mod: LT,,, | Performed by: ORTHOPAEDIC SURGERY

## 2017-05-01 PROCEDURE — 27800517 HC TRAY,EPIDURAL-CONTINUOUS: Performed by: PAIN MEDICINE

## 2017-05-01 PROCEDURE — 27000190 HC CPAP FULL FACE MASK W/VALVE

## 2017-05-01 PROCEDURE — 97535 SELF CARE MNGMENT TRAINING: CPT

## 2017-05-01 PROCEDURE — 27000221 HC OXYGEN, UP TO 24 HOURS

## 2017-05-01 PROCEDURE — C1713 ANCHOR/SCREW BN/BN,TIS/BN: HCPCS | Performed by: ORTHOPAEDIC SURGERY

## 2017-05-01 PROCEDURE — 36000710: Performed by: ORTHOPAEDIC SURGERY

## 2017-05-01 PROCEDURE — 71000033 HC RECOVERY, INTIAL HOUR: Performed by: ORTHOPAEDIC SURGERY

## 2017-05-01 PROCEDURE — 27200750 HC INSULATED NEEDLE/ STIMUPLEX: Performed by: PAIN MEDICINE

## 2017-05-01 PROCEDURE — 3E0T3CZ INTRODUCE REGIONAL ANESTH IN PERIPH NRV, PLEXI, PERC: ICD-10-PCS | Performed by: ORTHOPAEDIC SURGERY

## 2017-05-01 PROCEDURE — 88311 DECALCIFY TISSUE: CPT | Mod: 26,,,

## 2017-05-01 PROCEDURE — 82962 GLUCOSE BLOOD TEST: CPT | Performed by: ORTHOPAEDIC SURGERY

## 2017-05-01 PROCEDURE — 80048 BASIC METABOLIC PNL TOTAL CA: CPT

## 2017-05-01 PROCEDURE — 36000711: Performed by: ORTHOPAEDIC SURGERY

## 2017-05-01 PROCEDURE — D9220A PRA ANESTHESIA: Mod: CRNA,,, | Performed by: NURSE ANESTHETIST, CERTIFIED REGISTERED

## 2017-05-01 PROCEDURE — D9220A PRA ANESTHESIA: Mod: ANES,,, | Performed by: ANESTHESIOLOGY

## 2017-05-01 PROCEDURE — C1776 JOINT DEVICE (IMPLANTABLE): HCPCS | Performed by: ORTHOPAEDIC SURGERY

## 2017-05-01 PROCEDURE — 63600175 PHARM REV CODE 636 W HCPCS: Performed by: ORTHOPAEDIC SURGERY

## 2017-05-01 DEVICE — CEMENT BONE SIMPLE P INDIVID: Type: IMPLANTABLE DEVICE | Site: KNEE | Status: FUNCTIONAL

## 2017-05-01 DEVICE — PATELLA PSN CEM POLY 8X29MM: Type: IMPLANTABLE DEVICE | Site: KNEE | Status: FUNCTIONAL

## 2017-05-01 DEVICE — PSN TIB STM 5 DEG SZ CL: Type: IMPLANTABLE DEVICE | Site: KNEE | Status: FUNCTIONAL

## 2017-05-01 DEVICE — PLUG BONE #5: Type: IMPLANTABLE DEVICE | Site: KNEE | Status: FUNCTIONAL

## 2017-05-01 RX ORDER — FENTANYL CITRATE 50 UG/ML
25 INJECTION, SOLUTION INTRAMUSCULAR; INTRAVENOUS EVERY 5 MIN PRN
Status: COMPLETED | OUTPATIENT
Start: 2017-05-01 | End: 2017-05-01

## 2017-05-01 RX ORDER — POTASSIUM CHLORIDE 750 MG/1
10 CAPSULE, EXTENDED RELEASE ORAL 2 TIMES DAILY
Status: DISCONTINUED | OUTPATIENT
Start: 2017-05-01 | End: 2017-05-03 | Stop reason: HOSPADM

## 2017-05-01 RX ORDER — FENTANYL CITRATE 50 UG/ML
25 INJECTION, SOLUTION INTRAMUSCULAR; INTRAVENOUS EVERY 5 MIN PRN
Status: DISCONTINUED | OUTPATIENT
Start: 2017-05-01 | End: 2017-05-01

## 2017-05-01 RX ORDER — MUPIROCIN 20 MG/G
1 OINTMENT TOPICAL
Status: COMPLETED | OUTPATIENT
Start: 2017-05-01 | End: 2017-05-01

## 2017-05-01 RX ORDER — IBUPROFEN 200 MG
16 TABLET ORAL
Status: DISCONTINUED | OUTPATIENT
Start: 2017-05-01 | End: 2017-05-03 | Stop reason: HOSPADM

## 2017-05-01 RX ORDER — LIDOCAINE HCL/PF 100 MG/5ML
SYRINGE (ML) INTRAVENOUS
Status: DISCONTINUED | OUTPATIENT
Start: 2017-05-01 | End: 2017-05-01

## 2017-05-01 RX ORDER — MIDAZOLAM HYDROCHLORIDE 1 MG/ML
1 INJECTION INTRAMUSCULAR; INTRAVENOUS EVERY 5 MIN PRN
Status: DISCONTINUED | OUTPATIENT
Start: 2017-05-01 | End: 2017-05-01 | Stop reason: HOSPADM

## 2017-05-01 RX ORDER — CEFAZOLIN SODIUM 2 G/50ML
2 SOLUTION INTRAVENOUS
Status: COMPLETED | OUTPATIENT
Start: 2017-05-01 | End: 2017-05-02

## 2017-05-01 RX ORDER — INSULIN ASPART 100 [IU]/ML
1-10 INJECTION, SOLUTION INTRAVENOUS; SUBCUTANEOUS
Status: DISCONTINUED | OUTPATIENT
Start: 2017-05-01 | End: 2017-05-03 | Stop reason: HOSPADM

## 2017-05-01 RX ORDER — OXYCODONE AND ACETAMINOPHEN 10; 325 MG/1; MG/1
1 TABLET ORAL EVERY 4 HOURS PRN
Qty: 90 TABLET | Refills: 0 | Status: SHIPPED | OUTPATIENT
Start: 2017-05-01 | End: 2017-06-27

## 2017-05-01 RX ORDER — PROPOFOL 10 MG/ML
VIAL (ML) INTRAVENOUS
Status: DISCONTINUED | OUTPATIENT
Start: 2017-05-01 | End: 2017-05-01

## 2017-05-01 RX ORDER — KETAMINE HYDROCHLORIDE 100 MG/ML
INJECTION, SOLUTION INTRAMUSCULAR; INTRAVENOUS
Status: DISCONTINUED | OUTPATIENT
Start: 2017-05-01 | End: 2017-05-01

## 2017-05-01 RX ORDER — FAMOTIDINE 20 MG/1
20 TABLET, FILM COATED ORAL 2 TIMES DAILY
Status: DISCONTINUED | OUTPATIENT
Start: 2017-05-01 | End: 2017-05-03 | Stop reason: HOSPADM

## 2017-05-01 RX ORDER — OXYCODONE HYDROCHLORIDE 5 MG/1
5 TABLET ORAL
Status: DISCONTINUED | OUTPATIENT
Start: 2017-05-01 | End: 2017-05-03 | Stop reason: HOSPADM

## 2017-05-01 RX ORDER — ONDANSETRON HYDROCHLORIDE 8 MG/1
8 TABLET, FILM COATED ORAL EVERY 8 HOURS PRN
Qty: 30 TABLET | Refills: 0 | Status: SHIPPED | OUTPATIENT
Start: 2017-05-01 | End: 2017-06-27

## 2017-05-01 RX ORDER — ACETAMINOPHEN 10 MG/ML
1000 INJECTION, SOLUTION INTRAVENOUS ONCE
Status: COMPLETED | OUTPATIENT
Start: 2017-05-01 | End: 2017-05-01

## 2017-05-01 RX ORDER — LIDOCAINE HYDROCHLORIDE 10 MG/ML
1 INJECTION, SOLUTION EPIDURAL; INFILTRATION; INTRACAUDAL; PERINEURAL
Status: DISCONTINUED | OUTPATIENT
Start: 2017-05-01 | End: 2017-05-01 | Stop reason: HOSPADM

## 2017-05-01 RX ORDER — OXYCODONE HYDROCHLORIDE 5 MG/1
5 TABLET ORAL
Status: DISCONTINUED | OUTPATIENT
Start: 2017-05-01 | End: 2017-05-01

## 2017-05-01 RX ORDER — FENTANYL CITRATE 50 UG/ML
INJECTION, SOLUTION INTRAMUSCULAR; INTRAVENOUS
Status: COMPLETED
Start: 2017-05-01 | End: 2017-05-01

## 2017-05-01 RX ORDER — PROPOFOL 10 MG/ML
VIAL (ML) INTRAVENOUS CONTINUOUS PRN
Status: DISCONTINUED | OUTPATIENT
Start: 2017-05-01 | End: 2017-05-01

## 2017-05-01 RX ORDER — RAMELTEON 8 MG/1
8 TABLET ORAL NIGHTLY PRN
Status: DISCONTINUED | OUTPATIENT
Start: 2017-05-01 | End: 2017-05-03 | Stop reason: HOSPADM

## 2017-05-01 RX ORDER — CIPROFLOXACIN 2 MG/ML
INJECTION, SOLUTION INTRAVENOUS
Status: DISCONTINUED | OUTPATIENT
Start: 2017-05-01 | End: 2017-05-01

## 2017-05-01 RX ORDER — PANTOPRAZOLE SODIUM 40 MG/1
40 TABLET, DELAYED RELEASE ORAL DAILY
Status: DISCONTINUED | OUTPATIENT
Start: 2017-05-01 | End: 2017-05-03 | Stop reason: HOSPADM

## 2017-05-01 RX ORDER — ONDANSETRON 2 MG/ML
4 INJECTION INTRAMUSCULAR; INTRAVENOUS EVERY 8 HOURS PRN
Status: DISCONTINUED | OUTPATIENT
Start: 2017-05-01 | End: 2017-05-03 | Stop reason: HOSPADM

## 2017-05-01 RX ORDER — SODIUM CHLORIDE 9 MG/ML
INJECTION, SOLUTION INTRAVENOUS CONTINUOUS PRN
Status: DISCONTINUED | OUTPATIENT
Start: 2017-05-01 | End: 2017-05-01

## 2017-05-01 RX ORDER — ONDANSETRON 2 MG/ML
4 INJECTION INTRAMUSCULAR; INTRAVENOUS EVERY 12 HOURS PRN
Status: DISCONTINUED | OUTPATIENT
Start: 2017-05-01 | End: 2017-05-01

## 2017-05-01 RX ORDER — ASPIRIN 325 MG
325 TABLET, DELAYED RELEASE (ENTERIC COATED) ORAL 2 TIMES DAILY
Status: DISCONTINUED | OUTPATIENT
Start: 2017-05-01 | End: 2017-05-03 | Stop reason: HOSPADM

## 2017-05-01 RX ORDER — OXYCODONE HYDROCHLORIDE 5 MG/1
10 TABLET ORAL
Status: DISCONTINUED | OUTPATIENT
Start: 2017-05-01 | End: 2017-05-01

## 2017-05-01 RX ORDER — SODIUM CHLORIDE 9 MG/ML
INJECTION, SOLUTION INTRAVENOUS CONTINUOUS
Status: ACTIVE | OUTPATIENT
Start: 2017-05-01 | End: 2017-05-02

## 2017-05-01 RX ORDER — POLYETHYLENE GLYCOL 3350 17 G/17G
17 POWDER, FOR SOLUTION ORAL DAILY
Status: DISCONTINUED | OUTPATIENT
Start: 2017-05-01 | End: 2017-05-03 | Stop reason: HOSPADM

## 2017-05-01 RX ORDER — SODIUM CHLORIDE 0.9 % (FLUSH) 0.9 %
3 SYRINGE (ML) INJECTION EVERY 8 HOURS PRN
Status: DISCONTINUED | OUTPATIENT
Start: 2017-05-01 | End: 2017-05-03 | Stop reason: HOSPADM

## 2017-05-01 RX ORDER — VALSARTAN 160 MG/1
320 TABLET ORAL DAILY
Status: DISCONTINUED | OUTPATIENT
Start: 2017-05-02 | End: 2017-05-03 | Stop reason: HOSPADM

## 2017-05-01 RX ORDER — IBUPROFEN 200 MG
24 TABLET ORAL
Status: DISCONTINUED | OUTPATIENT
Start: 2017-05-01 | End: 2017-05-01

## 2017-05-01 RX ORDER — OXYCODONE HYDROCHLORIDE 5 MG/1
10 TABLET ORAL
Status: DISCONTINUED | OUTPATIENT
Start: 2017-05-01 | End: 2017-05-03 | Stop reason: HOSPADM

## 2017-05-01 RX ORDER — CELECOXIB 200 MG/1
400 CAPSULE ORAL ONCE
Status: COMPLETED | OUTPATIENT
Start: 2017-05-01 | End: 2017-05-01

## 2017-05-01 RX ORDER — ROPIVACAINE HYDROCHLORIDE 2 MG/ML
8 INJECTION, SOLUTION EPIDURAL; INFILTRATION; PERINEURAL CONTINUOUS
Status: DISCONTINUED | OUTPATIENT
Start: 2017-05-01 | End: 2017-05-03 | Stop reason: HOSPADM

## 2017-05-01 RX ORDER — IBUPROFEN 200 MG
24 TABLET ORAL
Status: DISCONTINUED | OUTPATIENT
Start: 2017-05-01 | End: 2017-05-03 | Stop reason: HOSPADM

## 2017-05-01 RX ORDER — DOCUSATE SODIUM 100 MG/1
100 CAPSULE, LIQUID FILLED ORAL 2 TIMES DAILY
Qty: 60 CAPSULE | Refills: 0 | Status: SHIPPED | OUTPATIENT
Start: 2017-05-01 | End: 2017-06-27

## 2017-05-01 RX ORDER — MIDAZOLAM HYDROCHLORIDE 1 MG/ML
INJECTION, SOLUTION INTRAMUSCULAR; INTRAVENOUS
Status: DISCONTINUED | OUTPATIENT
Start: 2017-05-01 | End: 2017-05-01

## 2017-05-01 RX ORDER — FENTANYL CITRATE 50 UG/ML
INJECTION, SOLUTION INTRAMUSCULAR; INTRAVENOUS
Status: DISCONTINUED | OUTPATIENT
Start: 2017-05-01 | End: 2017-05-01

## 2017-05-01 RX ORDER — MORPHINE SULFATE 2 MG/ML
2 INJECTION, SOLUTION INTRAMUSCULAR; INTRAVENOUS
Status: DISCONTINUED | OUTPATIENT
Start: 2017-05-01 | End: 2017-05-01

## 2017-05-01 RX ORDER — ONDANSETRON 2 MG/ML
INJECTION INTRAMUSCULAR; INTRAVENOUS
Status: DISCONTINUED | OUTPATIENT
Start: 2017-05-01 | End: 2017-05-01

## 2017-05-01 RX ORDER — ROPIVACAINE HYDROCHLORIDE 2 MG/ML
INJECTION, SOLUTION EPIDURAL; INFILTRATION; PERINEURAL
Status: DISPENSED
Start: 2017-05-01 | End: 2017-05-01

## 2017-05-01 RX ORDER — SODIUM CHLORIDE 0.9 % (FLUSH) 0.9 %
3 SYRINGE (ML) INJECTION
Status: DISCONTINUED | OUTPATIENT
Start: 2017-05-01 | End: 2017-05-01 | Stop reason: HOSPADM

## 2017-05-01 RX ORDER — GLUCAGON 1 MG
1 KIT INJECTION
Status: DISCONTINUED | OUTPATIENT
Start: 2017-05-01 | End: 2017-05-03 | Stop reason: HOSPADM

## 2017-05-01 RX ORDER — ASPIRIN 325 MG
325 TABLET ORAL 2 TIMES DAILY
Qty: 60 TABLET | Refills: 0 | Status: SHIPPED | OUTPATIENT
Start: 2017-05-01 | End: 2017-06-26

## 2017-05-01 RX ORDER — SODIUM CHLORIDE 9 MG/ML
INJECTION, SOLUTION INTRAVENOUS
Status: COMPLETED | OUTPATIENT
Start: 2017-05-01 | End: 2017-05-01

## 2017-05-01 RX ORDER — GENTAMICIN SULFATE 40 MG/ML
INJECTION, SOLUTION INTRAMUSCULAR; INTRAVENOUS
Status: DISCONTINUED | OUTPATIENT
Start: 2017-05-01 | End: 2017-05-01 | Stop reason: HOSPADM

## 2017-05-01 RX ORDER — MORPHINE SULFATE 2 MG/ML
2 INJECTION, SOLUTION INTRAMUSCULAR; INTRAVENOUS
Status: DISCONTINUED | OUTPATIENT
Start: 2017-05-01 | End: 2017-05-03 | Stop reason: HOSPADM

## 2017-05-01 RX ORDER — ACETAMINOPHEN 500 MG
1000 TABLET ORAL EVERY 6 HOURS
Status: DISPENSED | OUTPATIENT
Start: 2017-05-01 | End: 2017-05-03

## 2017-05-01 RX ORDER — PREGABALIN 75 MG/1
75 CAPSULE ORAL NIGHTLY
Status: DISCONTINUED | OUTPATIENT
Start: 2017-05-01 | End: 2017-05-03 | Stop reason: HOSPADM

## 2017-05-01 RX ORDER — PHENYLEPHRINE HYDROCHLORIDE 10 MG/ML
INJECTION INTRAVENOUS
Status: DISCONTINUED | OUTPATIENT
Start: 2017-05-01 | End: 2017-05-01

## 2017-05-01 RX ORDER — NALOXONE HCL 0.4 MG/ML
0.02 VIAL (ML) INJECTION
Status: DISCONTINUED | OUTPATIENT
Start: 2017-05-01 | End: 2017-05-03 | Stop reason: HOSPADM

## 2017-05-01 RX ORDER — INSULIN ASPART 100 [IU]/ML
0-5 INJECTION, SOLUTION INTRAVENOUS; SUBCUTANEOUS
Status: DISCONTINUED | OUTPATIENT
Start: 2017-05-01 | End: 2017-05-01

## 2017-05-01 RX ORDER — METOPROLOL SUCCINATE 25 MG/1
25 TABLET, EXTENDED RELEASE ORAL DAILY
Status: DISCONTINUED | OUTPATIENT
Start: 2017-05-02 | End: 2017-05-03 | Stop reason: HOSPADM

## 2017-05-01 RX ORDER — LANOLIN ALCOHOL/MO/W.PET/CERES
1000 CREAM (GRAM) TOPICAL DAILY
Status: DISCONTINUED | OUTPATIENT
Start: 2017-05-01 | End: 2017-05-03 | Stop reason: HOSPADM

## 2017-05-01 RX ORDER — CEFAZOLIN SODIUM 2 G/50ML
2 SOLUTION INTRAVENOUS
Status: DISCONTINUED | OUTPATIENT
Start: 2017-05-01 | End: 2017-05-01 | Stop reason: HOSPADM

## 2017-05-01 RX ORDER — IBUPROFEN 200 MG
16 TABLET ORAL
Status: DISCONTINUED | OUTPATIENT
Start: 2017-05-01 | End: 2017-05-01

## 2017-05-01 RX ORDER — GLUCAGON 1 MG
1 KIT INJECTION
Status: DISCONTINUED | OUTPATIENT
Start: 2017-05-01 | End: 2017-05-01

## 2017-05-01 RX ORDER — HYDROCHLOROTHIAZIDE 12.5 MG/1
12.5 TABLET ORAL DAILY
Status: DISCONTINUED | OUTPATIENT
Start: 2017-05-01 | End: 2017-05-03 | Stop reason: HOSPADM

## 2017-05-01 RX ORDER — AMLODIPINE BESYLATE 10 MG/1
10 TABLET ORAL EVERY MORNING
Status: DISCONTINUED | OUTPATIENT
Start: 2017-05-02 | End: 2017-05-03 | Stop reason: HOSPADM

## 2017-05-01 RX ORDER — PREGABALIN 75 MG/1
75 CAPSULE ORAL
Status: COMPLETED | OUTPATIENT
Start: 2017-05-01 | End: 2017-05-01

## 2017-05-01 RX ORDER — BISACODYL 10 MG
10 SUPPOSITORY, RECTAL RECTAL EVERY 12 HOURS PRN
Status: DISCONTINUED | OUTPATIENT
Start: 2017-05-01 | End: 2017-05-03 | Stop reason: HOSPADM

## 2017-05-01 RX ORDER — AMOXICILLIN 250 MG
1 CAPSULE ORAL 2 TIMES DAILY
Status: DISCONTINUED | OUTPATIENT
Start: 2017-05-01 | End: 2017-05-03 | Stop reason: HOSPADM

## 2017-05-01 RX ORDER — DEXAMETHASONE SODIUM PHOSPHATE 4 MG/ML
INJECTION, SOLUTION INTRA-ARTICULAR; INTRALESIONAL; INTRAMUSCULAR; INTRAVENOUS; SOFT TISSUE
Status: DISCONTINUED | OUTPATIENT
Start: 2017-05-01 | End: 2017-05-01

## 2017-05-01 RX ADMIN — KETAMINE HYDROCHLORIDE 10 MG: 100 INJECTION, SOLUTION, CONCENTRATE INTRAMUSCULAR; INTRAVENOUS at 07:05

## 2017-05-01 RX ADMIN — POLYETHYLENE GLYCOL 3350 17 G: 17 POWDER, FOR SOLUTION ORAL at 10:05

## 2017-05-01 RX ADMIN — FENTANYL CITRATE 25 MCG: 50 INJECTION INTRAMUSCULAR; INTRAVENOUS at 11:05

## 2017-05-01 RX ADMIN — PROPOFOL 50 MG: 10 INJECTION, EMULSION INTRAVENOUS at 07:05

## 2017-05-01 RX ADMIN — DEXAMETHASONE SODIUM PHOSPHATE 8 MG: 4 INJECTION, SOLUTION INTRAMUSCULAR; INTRAVENOUS at 07:05

## 2017-05-01 RX ADMIN — SODIUM CHLORIDE, SODIUM GLUCONATE, SODIUM ACETATE, POTASSIUM CHLORIDE, MAGNESIUM CHLORIDE, SODIUM PHOSPHATE, DIBASIC, AND POTASSIUM PHOSPHATE: .53; .5; .37; .037; .03; .012; .00082 INJECTION, SOLUTION INTRAVENOUS at 08:05

## 2017-05-01 RX ADMIN — CELECOXIB 400 MG: 200 CAPSULE ORAL at 09:05

## 2017-05-01 RX ADMIN — HYDROCHLOROTHIAZIDE 12.5 MG: 12.5 TABLET ORAL at 09:05

## 2017-05-01 RX ADMIN — FENTANYL CITRATE 25 MCG: 50 INJECTION, SOLUTION INTRAMUSCULAR; INTRAVENOUS at 07:05

## 2017-05-01 RX ADMIN — POTASSIUM CHLORIDE 10 MEQ: 750 CAPSULE, EXTENDED RELEASE ORAL at 09:05

## 2017-05-01 RX ADMIN — LIDOCAINE HYDROCHLORIDE 40 MG: 20 INJECTION, SOLUTION INTRAVENOUS at 07:05

## 2017-05-01 RX ADMIN — STANDARDIZED SENNA CONCENTRATE AND DOCUSATE SODIUM 1 TABLET: 8.6; 5 TABLET, FILM COATED ORAL at 09:05

## 2017-05-01 RX ADMIN — ASPIRIN 325 MG: 325 TABLET, DELAYED RELEASE ORAL at 09:05

## 2017-05-01 RX ADMIN — POTASSIUM CHLORIDE 10 MEQ: 750 CAPSULE, EXTENDED RELEASE ORAL at 10:05

## 2017-05-01 RX ADMIN — INSULIN ASPART 2 UNITS: 100 INJECTION, SOLUTION INTRAVENOUS; SUBCUTANEOUS at 09:05

## 2017-05-01 RX ADMIN — FENTANYL CITRATE 25 MCG: 50 INJECTION INTRAMUSCULAR; INTRAVENOUS at 10:05

## 2017-05-01 RX ADMIN — FENTANYL CITRATE 25 MCG: 50 INJECTION INTRAMUSCULAR; INTRAVENOUS at 12:05

## 2017-05-01 RX ADMIN — OXYCODONE HYDROCHLORIDE 10 MG: 5 TABLET ORAL at 12:05

## 2017-05-01 RX ADMIN — VANCOMYCIN HYDROCHLORIDE 1500 MG: 1 INJECTION, POWDER, LYOPHILIZED, FOR SOLUTION INTRAVENOUS at 06:05

## 2017-05-01 RX ADMIN — FAMOTIDINE 20 MG: 20 TABLET, FILM COATED ORAL at 09:05

## 2017-05-01 RX ADMIN — ROPIVACAINE HYDROCHLORIDE 8 ML/HR: 2 INJECTION, SOLUTION EPIDURAL; INFILTRATION at 09:05

## 2017-05-01 RX ADMIN — SODIUM CHLORIDE: 0.9 INJECTION, SOLUTION INTRAVENOUS at 06:05

## 2017-05-01 RX ADMIN — KETAMINE HYDROCHLORIDE 20 MG: 100 INJECTION, SOLUTION, CONCENTRATE INTRAMUSCULAR; INTRAVENOUS at 07:05

## 2017-05-01 RX ADMIN — PREGABALIN 75 MG: 75 CAPSULE ORAL at 09:05

## 2017-05-01 RX ADMIN — PHENYLEPHRINE HYDROCHLORIDE 100 MCG: 10 INJECTION INTRAVENOUS at 08:05

## 2017-05-01 RX ADMIN — MIDAZOLAM HYDROCHLORIDE 1 MG: 1 INJECTION, SOLUTION INTRAMUSCULAR; INTRAVENOUS at 06:05

## 2017-05-01 RX ADMIN — PREGABALIN 75 MG: 75 CAPSULE ORAL at 06:05

## 2017-05-01 RX ADMIN — CEFAZOLIN SODIUM 2 G: 2 SOLUTION INTRAVENOUS at 06:05

## 2017-05-01 RX ADMIN — VANCOMYCIN HYDROCHLORIDE 1250 MG: 1 INJECTION, POWDER, LYOPHILIZED, FOR SOLUTION INTRAVENOUS at 06:05

## 2017-05-01 RX ADMIN — MUPIROCIN 1 G: 20 OINTMENT TOPICAL at 06:05

## 2017-05-01 RX ADMIN — OXYCODONE HYDROCHLORIDE 10 MG: 5 TABLET ORAL at 09:05

## 2017-05-01 RX ADMIN — KETAMINE HYDROCHLORIDE 10 MG: 100 INJECTION, SOLUTION, CONCENTRATE INTRAMUSCULAR; INTRAVENOUS at 08:05

## 2017-05-01 RX ADMIN — FENTANYL CITRATE 25 MCG: 50 INJECTION, SOLUTION INTRAMUSCULAR; INTRAVENOUS at 09:05

## 2017-05-01 RX ADMIN — PANTOPRAZOLE SODIUM 40 MG: 40 TABLET, DELAYED RELEASE ORAL at 10:05

## 2017-05-01 RX ADMIN — MIDAZOLAM HYDROCHLORIDE 1 MG: 1 INJECTION, SOLUTION INTRAMUSCULAR; INTRAVENOUS at 08:05

## 2017-05-01 RX ADMIN — SODIUM CHLORIDE: 0.9 INJECTION, SOLUTION INTRAVENOUS at 09:05

## 2017-05-01 RX ADMIN — ONDANSETRON 4 MG: 2 INJECTION INTRAMUSCULAR; INTRAVENOUS at 07:05

## 2017-05-01 RX ADMIN — ACETAMINOPHEN 1000 MG: 500 TABLET ORAL at 11:05

## 2017-05-01 RX ADMIN — ACETAMINOPHEN 1000 MG: 500 TABLET ORAL at 04:05

## 2017-05-01 RX ADMIN — ACETAMINOPHEN 1000 MG: 10 INJECTION, SOLUTION INTRAVENOUS at 09:05

## 2017-05-01 RX ADMIN — OXYCODONE HYDROCHLORIDE 10 MG: 5 TABLET ORAL at 06:05

## 2017-05-01 RX ADMIN — INSULIN ASPART 4 UNITS: 100 INJECTION, SOLUTION INTRAVENOUS; SUBCUTANEOUS at 03:05

## 2017-05-01 RX ADMIN — PHENYLEPHRINE HYDROCHLORIDE 100 MCG: 10 INJECTION INTRAVENOUS at 09:05

## 2017-05-01 RX ADMIN — PROPOFOL 100 MCG/KG/MIN: 10 INJECTION, EMULSION INTRAVENOUS at 07:05

## 2017-05-01 RX ADMIN — CIPROFLOXACIN 400 MG: 2 INJECTION, SOLUTION INTRAVENOUS at 07:05

## 2017-05-01 NOTE — OP NOTE
DATE OF PROCEDURE:  05/01/2017.    SURGEON:  John Lockwood [facility], Jr., M.D.    ASSISTANT:  No assistant at this time.    OPERATION PERFORMED:  Left total knee arthroplasty.    PREOPERATIVE DIAGNOSIS:  Osteoarthritis.    POSTOPERATIVE DIAGNOSIS:  Osteoarthritis.    COMPONENTS USED:  Persona knee system by [employer].  We used a size 4 femur, left,   standard, posterior stabilized; size C tibia, left, 5-degree stem; a 12 mm   articular insert, posterior stabilized; vitamin E, highly cross-linked poly; and   a 29 mm patella.    ESTIMATED BLOOD LOSS:  100 mL.    SPECIMEN:  Resected bone and tissue sent to Pathology for routine examination.    OPERATIVE TECHNIQUE:  The patient was placed supine on the operating table.    Spinal anesthesia was introduced.  The left leg was prepped and draped in   sterile fashion.  The room was laminar airflow.  The patient was given   preoperative antibiotics.  The OR team wore the sterile exhaust suits in order   to minimize risk for infection.  A foot pump was placed on the left foot to help   prevent DVT.  Right leg was exsanguinated and the tourniquet was inflated to   300 pounds of pressure.  A straight anterior incision was made.  Sharp   dissection was carried down to the extensor mechanism.  The extensor mechanism   was opened in a straight Insall approach.  Partial release of the medial   collateral ligament was performed to correct some slight varus deformity.  The   intramedullary guide was placed in the femur.  Distal cut was made at 5 degrees   of valgus on a +1 setting.  The proximal tibial cut was performed.  We used a   very generous proximal tibial cut.  We then measured the femur and it measured   for a 4 and cut it posteriorly for the 4.  The flexion gap was a little bigger   than the extension gap, so we took 2 more off the extension gap; that balanced   things nicely and I went ahead and did the notch and chamfer-plasty on the   femur, sized the tibia to a C and drilled  and prepared it with the same rotation   as the femur.  The patella was 23 mm and we cut it at 15.  I then Pulsavac'd   and dried the surfaces, cemented the tibial baseplate, then the femoral   component, removed the excess cement, put the 12 mm trial in, put the knee out   in extension and cemented the patella.  Bathed the knee in the Betadine   solution, then Pulsavac cleared.  Once the cement was hard, we put the actual 12   mm insert in, put the knee out in extension and closed the extensor mechanism   with 1 Vicryl over tranexamic acid solution, closed the subcutaneous tissues   with 0 and 3-0 Vicryl and the skin with a running 3-0 Monocryl and a skin   adhesive.  Sterile surgical dressing was applied.  There were no complications   to the procedure.      MC  dd: 05/01/2017 09:34:17 (CDT)  td: 05/01/2017 09:53:02 (CDT)  Doc ID   #[id number]  Job ID #[id number]    CC:

## 2017-05-01 NOTE — ANESTHESIA PROCEDURE NOTES
Left Adductor Cath     Patient location during procedure: pre-op   Block not for primary anesthetic.  Reason for block: at surgeon's request and post-op pain management   Post-op Pain Location: left knee pain   Start time: 5/1/2017 6:26 AM  Timeout: 5/1/2017 6:24 AM   End time: 5/1/2017 6:45 AM  Staffing  Anesthesiologist: SIDNEY WILLIAMSON  Performed by: anesthesiologist   Preanesthetic Checklist  Completed: patient identified, site marked, surgical consent, pre-op evaluation, timeout performed, IV checked, risks and benefits discussed and monitors and equipment checked  Peripheral Block  Patient position: supine  Prep: ChloraPrep and site prepped and draped  Patient monitoring: heart rate, cardiac monitor, continuous pulse ox, continuous capnometry and frequent blood pressure checks  Block type: adductor canal  Laterality: left  Injection technique: continuous  Needle  Needle type: Tuohy   Needle gauge: 17 G  Needle length: 3.5 in  Needle localization: anatomical landmarks and ultrasound guidance  Needle insertion depth: 5 cm  Catheter type: spring wound  Catheter size: 19 G  Catheter at skin depth: 10 cm  Test dose: lidocaine 1.5% with Epi 1-to-200,000 and negative   -ultrasound image captured on disc.  Assessment  Injection assessment: negative aspiration, negative parasthesia and local visualized surrounding nerve  Paresthesia pain: none  Heart rate change: no  Slow fractionated injection: yes  Medications:  Bolus administered: 20 mL of 0.25 ropivacaine  Epinephrine added: 3.75 mcg/mL (1/300,000)  Additional Notes  VSS.  [facility] RN monitoring vitals throughout procedure.  Patient tolerated procedure well.

## 2017-05-01 NOTE — NURSING
Pt admitted to floor, stable, VSS, no complaints at this time noted or stated, I.S at bedside, SCDs intact, will hand over to nurse. BITE pain menu, TV guide, pain control pamphlet, given, explained, and offered to patient. Elvia Barragan RN

## 2017-05-01 NOTE — PROGRESS NOTES
Pt resting quietly,snoring, VSS per monitor, resp unlabored, herminia po water and meds, LLE with ACE and polar ice intact, wiggles toes freely, sensation intact, cap refill<3sec, states pain 10/10 when awoken from sleep, w/o grimacing, guarding or other pain indicators, [facility] to bedside,  updated on pt status, stable at present, will continue to monitor.

## 2017-05-01 NOTE — ANESTHESIA PROCEDURE NOTES
Spinal    Diagnosis: knee pain  Patient location during procedure: OR  Start time: 5/1/2017 7:15 AM  Timeout: 5/1/2017 7:13 AM  End time: 5/1/2017 7:20 AM  Staffing  Anesthesiologist: DENIA CORDERO  Performed by: anesthesiologist   Preanesthetic Checklist  Completed: patient identified, site marked, surgical consent, pre-op evaluation, timeout performed, IV checked, risks and benefits discussed and monitors and equipment checked  Spinal Block  Patient position: sitting  Prep: ChloraPrep  Patient monitoring: heart rate, cardiac monitor and continuous pulse ox  Approach: midline  Location: L3-4  Injection technique: single shot  CSF Fluid: clear free-flowing CSF  Needle  Needle type: Nydia   Needle gauge: 25 G  Needle length: 5 in  Needle localization: anatomical landmarks  Assessment  Sensory level: T8   Dermatomal levels determined by pinch or prick  Ease of block: easy  Patient's tolerance of the procedure: comfortable throughout block  Medications:  Bolus administered: 2.3 mL of 2.0 mepivacaine  Additional Notes  17 G Tuohy used to find epidural space with DELANO technique - 5 inch spinal needle placed through Tuohy for spinal injection - no epidural catheter threaded.  2 attempts made, 2nd successful attempt at L4/5

## 2017-05-01 NOTE — PLAN OF CARE
Problem: Occupational Therapy Goal  Goal: Occupational Therapy Goal  Goals to be met by: 05/08    Patient will increase functional independence with ADLs by performing:    UE Dressing with Supervision.  LE Dressing with Supervision with AD as needed.  Grooming while standing with Supervision.  Toileting from bedside commode with Supervision for hygiene and clothing management.   Stand pivot transfers with Supervision.  Toilet transfer to bedside commode with Supervision.      INGRID Arrieta  5/1/2017

## 2017-05-01 NOTE — PT/OT/SLP EVAL
Physical Therapy   Evaluation    Kaylee Romero   MRN: [id number]     PT Received On: 2017  PT Start Time: 1135  PT Stop Time: 1155   PT Total Time: 20 minutes     Billable Minutes:  Evaluation 10 minutes Therapeutic Activity 10 minutes     Diagnosis: S/p L TKA    Past Medical History:   Diagnosis Date    Acid reflux     Arthritis     Cataract     Diabetes mellitus, type 2     Dry mouth     Hyperlipidemia 2014    Hypertension     KAMRAN (obstructive sleep apnea)      Past Surgical History:   Procedure Laterality Date    ankle surgery (l)      APPENDECTOMY       SECTION      DILATION AND CURETTAGE OF UTERUS      KNEE ARTHROSCOPY W/ DEBRIDEMENT         Referring physician: [facility]  Date referred to PT: 2017     General Precautions: Standard, fall  Orthopedic Precautions: LLE weight bearing as tolerated   Braces: none    Pt found with blood pressure cuff, telemetry, PCEA, pulse ox, peripheral IV Polar Ice, and FCD.      Do you have any cultural, spiritual, Hindu conflicts, given your current situation?: none stated     Patient History:  Pt lives with her  and son in a [facility] with 3 PAUL with R railing.   assist available.      DME owned: none    Previous Level of Function:  Ambulation: (I)  Transfers: (I)  ADLs: (I)    Subjective:  Communicated with RN prior to session.    Pt agreeable to PT eval    Chief Complaint: pain and lethargy   Patient goals: to return home    Pain level prior: 7/10 in L knee  Pain level post: 7/10 in L knee    Objective:  Patient found supine in bed    Cognitive Exam:  Oriented to: Person, Place, Time and Situation  Follows Commands/attention: Follows multistep  commands  Communication: clear/fluent  Safety awareness/insight to disability: intact    Physical Exam:  Postural examination/scapula alignment: Rounded shoulder    Skin integrity: Visible skin intact  Edema: Mild LLE    Sensation:   Intact    Lower Extremity Range of Motion:  Right Lower  Extremity: WFL  Left Lower Extremity: WFL except knee    Lower Extremity Strength:  Right Lower Extremity: WFL  Left Lower Extremity: WFL except knee    Functional Mobility:    Bed Mobility:    Supine to sit:  Mod A with LLE assistance   Sit to supine: Max A 2/2 lethargy     Transfers:    Sit<>Stand: Min A with SW     Ambulation:    Pt ambulated 3 side steps to L with Mod A and SW 2/2 lethargy.  Max verbal cueing given for SW management, upright posture, and to keep eyes open.      Balance:   Static Sit: FAIR-: Maintains without assist but inconsistent   Dynamic Sit:  FAIR: Cannot move trunk without losing balance  Static Stand: POOR: Needs MODERATE assist to maintain  Dynamic stand: POOR: N/A    Therapeutic Activities and Exercises:  PT evaluation performed.  Pt educated on role of PT, safety with functional mobility.      Patient left supine with all lines intact and RN notified.    AM-PAC 6 CLICK MOBILITY  1 = Unable, Total/Dependent Assistance  2 = A lot, Maximum/Moderate Assistance  3 = A little, Minimum/Contact Guard/Supervision  4 = None, Modified [place]/Independent    Turning over in bed (including adjusting bedclothes, sheets and blankets)?: 3  Sitting down on and standing up from a chair with arms (e.g., wheelchair, bedside commode, etc.): 3  Moving from lying on back to sitting on the side of the bed?: 3  Moving to and from a bed to a chair (including a wheelchair)?: 3  Need to walk in hospital room?: 3  Climbing 3-5 steps with a railing?: 2  Total Score: 17    AM-PAC Raw Score   CMS G-Code Modifier   Level of Impairment   Assistance     6   CN   100%         Total / Unable   7 - 9   CM   80 - 100%   Maximal Assist     10 - 14   CL   60 - 80%   Moderate Assist     15 - 19   CK   40 - 60%   Moderate Assist     20 - 22   CJ   20 - 40%   Minimal Assist     23   CI   1-20%         SBA / CGA     24 CH   0%   Independent/Modified Independent       Assessment:  Kaylee Romero is a 72 y.o. female with  a medical diagnosis of s/p L TKA. She presents with deficits listed below.  Pt tolerated evaluation well today, but was very lethargic during evaluation.  Pt is a good candidate for skilled PT services to address the below deficits and to increase functional independence.      Rehab identified problem list/impairments: weakness, impaired endurance, impaired sensation, impaired self care skills, impaired functional mobilty, gait instability, impaired balance, decreased coordination, decreased lower extremity function, decreased safety awareness, pain, decreased ROM, impaired skin, edema, orthopedic precautions    Rehab potential is good.    Activity tolerance: good    Discharge recommendations: Home Health PT     Barriers to discharge: Inaccessible home environment     Equipment recommendations: rolling walker, bedside commode and transfer tub bench    GOALS:   Physical Therapy Goals        Problem: Physical Therapy Goal    Goal Priority Disciplines Outcome Goal Variances Interventions   Physical Therapy Goal     PT/OT, PT Ongoing (interventions implemented as appropriate)     Description:  Goals to be met by: 17     Patient will increase functional independence with mobility by performin. Supine to sit with Set-up [place]  2. Sit to supine with Set-up [place]  3. Sit to stand transfer with Stand-by Assistance  4. Gait  x 150 feet with Stand-by Assistance using Rolling Walker.   5. Ascend/descend 3 stair with right Handrails Contact Guard Assistance  6. Lower extremity exercise program x 30 reps per handout, with independence                PLAN:    Patient to be seen BID to address the above listed problems via gait training, therapeutic activity, therapeutic exercise, and neuromuscular re-education   Plan of Care reviewed with: patient    Brant Mayer, PT, DPT  2017   [phone]

## 2017-05-01 NOTE — TRANSFER OF CARE
"Anesthesia Transfer of Care Note    Patient: Kaylee Romero    Procedure(s) Performed: Procedure(s) (LRB):  REPLACEMENT-KNEE-TOTAL (Left)    Patient location: PACU    Anesthesia Type: general, regional and spinal    Transport from OR: Transported from OR on 6-10 L/min O2 by face mask with adequate spontaneous ventilation    Post pain: adequate analgesia    Post assessment: tolerated procedure well and no apparent anesthetic complications    Post vital signs: stable    Level of consciousness: responds to stimulation and sedated    Nausea/Vomiting: no nausea/vomiting    Complications: none          Last vitals:   Visit Vitals    /76 (BP Location: Right arm, Patient Position: Lying, BP Method: Automatic)    Pulse 87    Temp 36.3 °C (97.4 °F) (Axillary)    Resp 15    Ht 5' 2" (1.575 m)    Wt 98 kg (216 lb)    SpO2 100%    Breastfeeding No    BMI 39.51 kg/m2     "

## 2017-05-01 NOTE — IP AVS SNAPSHOT
[facility]  [address]  [facility] [address]  Phone: [phone]           Patient Discharge Instructions   Our goal is to set you up for success. This packet includes information on your condition, medications, and your home care.  It will help you care for yourself to prevent having to return to the hospital.     Please ask your nurse if you have any questions.      There are many details to remember when preparing to leave the hospital. Here is what you will need to do:    1. Take your medicine. If you are prescribed medications, review your Medication List on the following pages. You may have new medications to  at the pharmacy and others that you'll need to stop taking. Review the instructions for how and when to take your medications. Talk with your doctor or nurses if you are unsure of what to do.     2. Go to your follow-up appointments. Specific follow-up information is listed in the following pages. Your may be contacted by a nurse or clinical provider about future appointments. Be sure we have all of the phone numbers to reach you. Please contact your provider's office if you are unable to make an appointment.     3. Watch for warning signs. Your doctor or nurse will give you detailed warning signs to watch for and when to call for assistance. These instructions may also include educational information about your condition. If you experience any of warning signs to your health, call your doctor.           [facility]  Unless otherwise directed by your provider, please   contact [facility], our nurse care line   that is available for 24/7 assistance.     [phone] (toll-free)     Registered nurses in the [facility]   provide: appointment scheduling, clinical advisement, health education, and other advisory services.                  ** Verify the list of medication(s) below is accurate and up to date. Carry this with you in case of  emergency. If your medications have changed, please notify your healthcare provider.             Medication List      START taking these medications        Additional Info                      aspirin 325 MG tablet   Quantity:  60 tablet   Refills:  0   Dose:  325 mg    Instructions:  Take 1 tablet (325 mg total) by mouth 2 (two) times daily.     Begin Date    AM    Noon    PM    Bedtime       docusate sodium 100 MG capsule   Commonly known as:  COLACE   Quantity:  60 capsule   Refills:  0   Dose:  100 mg    Instructions:  Take 1 capsule (100 mg total) by mouth 2 (two) times daily.     Begin Date    AM    Noon    PM    Bedtime       ondansetron 8 MG tablet   Commonly known as:  ZOFRAN   Quantity:  30 tablet   Refills:  0   Dose:  8 mg    Instructions:  Take 1 tablet (8 mg total) by mouth every 8 (eight) hours as needed for Nausea.     Begin Date    AM    Noon    PM    Bedtime       oxycodone-acetaminophen  mg per tablet   Commonly known as:  PERCOCET   Quantity:  90 tablet   Refills:  0   Dose:  1 tablet    Instructions:  Take 1 tablet by mouth every 4 (four) hours as needed for Pain.     Begin Date    AM    Noon    PM    Bedtime         CHANGE how you take these medications        Additional Info                      dulaglutide 0.75 mg/0.5 mL Pnij   Commonly known as:  TRULICITY   Quantity:  4 Syringe   Refills:  11   Dose:  0.75 mg   What changed:  additional instructions    Instructions:  Inject 0.75 mg into the skin once a week.     Begin Date    AM    Noon    PM    Bedtime       metoprolol succinate 25 MG 24 hr tablet   Commonly known as:  TOPROL-XL   Quantity:  90 tablet   Refills:  3   Dose:  25 mg   What changed:  when to take this    Last time this was given:  25 mg on 5/3/2017  8:38 AM   Instructions:  Take 1 tablet (25 mg total) by mouth every evening.     Begin Date    AM    Noon    PM    Bedtime       potassium chloride 10 MEQ Cpsr   Commonly known as:  MICRO-K   Quantity:  180 capsule   Refills:   3   What changed:  See the new instructions.    Last time this was given:  10 mEq on 5/3/2017  8:38 AM   Instructions:  take 1 capsule by mouth twice a day     Begin Date    AM    Noon    PM    Bedtime         CONTINUE taking these medications        Additional Info                      alcohol swabs Padm   Commonly known as:  ALCOHOL PADS   Quantity:  11 each   Refills:  11   Dose:  1 each    Instructions:  Apply 1 each topically as needed.     Begin Date    AM    Noon    PM    Bedtime       amlodipine 10 MG tablet   Commonly known as:  NORVASC   Quantity:  90 tablet   Refills:  3    Last time this was given:  10 mg on 5/3/2017  7:36 AM   Instructions:  take 1 tablet by mouth every morning     Begin Date    AM    Noon    PM    Bedtime       blood sugar diagnostic Strp   Commonly known as:  ONETOUCH VERIO   Quantity:  50 strip   Refills:  11   Dose:  1 strip    Instructions:  1 strip by Misc.(Non-Drug; Combo Route) route 2 (two) times daily.     Begin Date    AM    Noon    PM    Bedtime       cyanocobalamin 1000 MCG tablet   Commonly known as:  VITAMIN B-12   Refills:  0   Dose:  1000 mcg    Last time this was given:  1,000 mcg on 5/3/2017  8:38 AM   Instructions:  Take 1,000 mcg by mouth once daily.     Begin Date    AM    Noon    PM    Bedtime       dextroamphetamine-amphetamine 10 mg Tab   Refills:  0    Instructions:  TK 1 T PO IN THE MORNING BID FOR 1 MONTH.     Begin Date    AM    Noon    PM    Bedtime       hydrochlorothiazide 12.5 MG Tab   Commonly known as:  HYDRODIURIL   Quantity:  30 tablet   Refills:  1   Dose:  12.5 mg    Last time this was given:  12.5 mg on 5/3/2017  8:38 AM   Instructions:  Take 1 tablet (12.5 mg total) by mouth once daily.     Begin Date    AM    Noon    PM    Bedtime       LOMOTIL 2.5-0.025 mg per tablet   Refills:  0   Generic drug:  diphenoxylate-atropine 2.5-0.025 mg    Instructions:  Take by mouth 3 (three) times daily as needed. 1 Tablet Oral Three times a day     Begin Date     AM    Noon    PM    Bedtime       metformin 500 MG tablet   Commonly known as:  GLUCOPHAGE   Quantity:  60 tablet   Refills:  11   Dose:  1000 mg   Indications:  type 2 diabetes mellitus    Instructions:  Take 2 tablets (1,000 mg total) by mouth daily with breakfast.     Begin Date    AM    Noon    PM    Bedtime       naproxen sodium 220 MG tablet   Commonly known as:  ANAPROX   Refills:  0   Dose:  440 mg   Indications:  Pain    Instructions:  Take 440 mg by mouth as needed.     Begin Date    AM    Noon    PM    Bedtime       omeprazole 40 MG capsule   Commonly known as:  PRILOSEC   Quantity:  90 capsule   Refills:  3   Dose:  40 mg    Instructions:  Take 1 capsule (40 mg total) by mouth once daily.     Begin Date    AM    Noon    PM    Bedtime       * ONETOUCH DELICA LANCETS 33 gauge Misc   Refills:  0   Generic drug:  lancets      Begin Date    AM    Noon    PM    Bedtime       * ACCU-CHEK FASTCLIX Misc   Refills:  0   Generic drug:  lancets      Begin Date    AM    Noon    PM    Bedtime       valsartan 320 MG tablet   Commonly known as:  DIOVAN   Quantity:  90 tablet   Refills:  3    Last time this was given:  320 mg on 5/3/2017  8:38 AM   Instructions:  take 1 tablet by mouth once daily     Begin Date    AM    Noon    PM    Bedtime       WAL-DRYL ALLERGY ORAL   Refills:  0    Instructions:  Take by mouth as needed (alergies).     Begin Date    AM    Noon    PM    Bedtime       * Notice:  This list has 2 medication(s) that are the same as other medications prescribed for you. Read the directions carefully, and ask your doctor or other care provider to review them with you.      STOP taking these medications     cyclobenzaprine 10 MG tablet   Commonly known as:  FLEXERIL       ibuprofen 800 MG tablet   Commonly known as:  ADVILMOTRIN   You were prescribed two or more home medications with a similar name. You should completely stop taking this medication.            Where to Get Your Medications      You can  get these medications from any pharmacy     Bring a paper prescription for each of these medications     aspirin 325 MG tablet    docusate sodium 100 MG capsule    ondansetron 8 MG tablet    oxycodone-acetaminophen  mg per tablet                  Please bring to all follow up appointments:    1. A copy of your discharge instructions.  2. All medicines you are currently taking in their original bottles.  3. Identification and insurance card.    Please arrive 15 minutes ahead of scheduled appointment time.    Please call 24 hours in advance if you must reschedule your appointment and/or time.        Your Scheduled Appointments     May 16, 2017  1:00 PM CDT   Post OP with HARSHAD Avery - Orthopedics ([facility] )    [address]  [facility] [address] [phone]            Jul 06, 2017  1:00 PM CDT   Non-Fasting Lab with [facility] [place]    [address], [address]  [facility] [address] [phone]            Jul 06, 2017  1:15 PM CDT   Urine with SPECIMEN, [place] [facility] ([facility]    [address], [address]  [facility] [address] [phone]            Jul 11, 2017  1:00 PM CDT   Established Patient with PAYAL Gonzalez,ANP-C   [facility]in[facility] ([facility] )    [address]  [facility] [address] [phone]              Follow-up Information     Follow up with Rachel Murdcok PA-C. Go on 5/16/2017.    Specialty:  Orthopedic Surgery    Why:  For wound re-check, Follow-Up Appointment    Contact information:    [address]  [facility] [id number] [phone]          Discharge Instructions     Future Orders    Activity as tolerated     Call MD for:  difficulty breathing or increased cough     Call MD for:  increased confusion or weakness     Call MD for:  persistent dizziness, light-headedness, or visual  "disturbances     Call MD for:  persistent nausea and vomiting or diarrhea     Call MD for:  redness, tenderness, or signs of infection (pain, swelling, redness, odor or green/yellow discharge around incision site)     Call MD for:  severe persistent headache     Call MD for:  temperature >100.4     Call MD for:  worsening rash     COMMODE FOR HOME USE     Questions:    Type:  Standard    Height:  5' 2" (1.575 m)    Weight:  98 kg (216 lb)    Does patient have medical equipment at home?:  none    Length of need (1-99 months):  99    Vendor:  [facility] to deliver, orders given to Rebecca    Expected Date of Delivery:  5/2/2017    Expected Time of Delivery:      Diet general     Questions:    Total calories:      Fat restriction, if any:      Protein restriction, if any:      Na restriction, if any:      Fluid restriction:      Additional restrictions:      HME - OTHER     Process Instructions:    Please indicate the type of home medical equipment you want to order by keying in the box named Type of Equipment.  Also, please include any relevant information for this home equipment order in the COMMENTS section.    Questions:    Type of Equipment:  hip kit    Height:  5' 2" (1.575 m)    Weight:  98 kg (216 lb)    Does patient have medical equipment at home?:  none    Vendor:  [facility] to deliver, orders given to Rebecca     Expected Date of Delivery:  5/2/2017    Expected Time of Delivery:      Leave dressing on - Keep it clean, dry, and intact until clinic visit     Lifting restrictions     Scheduling Instructions:    No strenuous exercise or lifting of >10 pounds.    Other restrictions (specify):     Scheduling Instructions:    Posterior hip precautions to operative extremity    Shower on day dressing removed (No bath)     Sponge bath only until clinic visit     TRANSFER TUB BENCH FOR HOME USE     Questions:    Type of Transfer Tub Bench:  Unpadded    Height:  5' 2" (1.575 m)    Weight: " " 98 kg (216 lb)    Does patient have medical equipment at home?:  none    Length of need (1-99 months):  99    Vendor:  [facility] to deliver, orders given to Rebecca    Expected Date of Delivery:  5/2/2017    Expected Time of Delivery:      WALKER FOR HOME USE     Questions:    Type of Walker:  Adult (5'4"-6'6")    With wheels?:  No    Height:  5' 2" (1.575 m)    Weight:  98 kg (216 lb)    Length of need (1-99 months):  99    Platform attachment:      Accessories/Other:      Assistance needed:      Does patient have medical equipment at home?:  none    Please check all that apply:  Patient's condition impairs ambulation.    Walker will be used for gait training.    Vendor:  Other (use comments) Comment - Disregard    Expected Date of Delivery:  5/2/2017    Expected Time of Delivery:      WALKER FOR HOME USE     Questions:    Type of Walker:  Adult (5'4"-6'6")    With wheels?:  Yes    Height:  5' 2" (1.575 m)    Weight:  98 kg (216 lb)    Length of need (1-99 months):  99    Platform attachment:      Accessories/Other:      Assistance needed:      Does patient have medical equipment at home?:  none    Please check all that apply:  Patient is unable to safely ambulate without equipment.    Walker will be used for gait training.    Vendor:  [facility] to deliver, orders given to Rebecca    Expected Date of Delivery:  5/2/2017    Expected Time of Delivery:      Weight bearing restrictions (specify)     Scheduling Instructions:    Weightbearing as tolerated        Primary Diagnosis     Your primary diagnosis was:  Degenerative Joint Disease Of Lower Leg      Admission Information     Date & Time Provider Department CSN    5/1/2017  5:56 AM [facility] MD [facility] [id number]      Care Providers     Provider Role Specialty Primary office phone    [facility] Attending Provider Orthopedic Surgery [phone]    [facility] Surgeon  " "Orthopedic Surgery [phone]      Your Vitals Were     BP Pulse Temp Resp Height Weight    181/100 (BP Location: Right arm, Patient Position: Lying, BP Method: Automatic) 88 97.5 °F (36.4 °C) (Oral) 16 5' 2" (1.575 m) 98 kg (216 lb)    SpO2 BMI             97% 39.51 kg/m2         Recent Lab Values        5/22/2014 8/18/2014 11/20/2014 8/24/2015 5/4/2016 12/30/2016 1/6/2017 3/21/2017     12:10 PM 12:45 PM 11:40 AM  6:30 PM  5:00 PM  7:26 AM 11:23 AM  1:00 PM    A1C 6.9 (H) 7.0 (H) 6.5 (H) 7.8 (H) 12.0 (H) 8.7 (H) 8.8 (H) 7.5 (H)      7.0 (H)          Comment for A1C at  7:26 AM on 12/30/2016:  According to ADA guidelines, hemoglobin A1C <7.0% represents  optimal control in non-pregnant diabetic patients.  Different  metrics may apply to specific populations.   Standards of Medical Care in Diabetes - 2016.  For the purpose of screening for the presence of diabetes:  <5.7%     Consistent with the absence of diabetes  5.7-6.4%  Consistent with increasing risk for diabetes   (prediabetes)  >or=6.5%  Consistent with diabetes  Currently no consensus exists for use of hemoglobin A1C  for diagnosis of diabetes for children.      Comment for A1C at 11:23 AM on 1/6/2017:  According to ADA guidelines, hemoglobin A1C <7.0% represents  optimal control in non-pregnant diabetic patients.  Different  metrics may apply to specific populations.   Standards of Medical Care in Diabetes - 2016.  For the purpose of screening for the presence of diabetes:  <5.7%     Consistent with the absence of diabetes  5.7-6.4%  Consistent with increasing risk for diabetes   (prediabetes)  >or=6.5%  Consistent with diabetes  Currently no consensus exists for use of hemoglobin A1C  for diagnosis of diabetes for children.      Comment for A1C at  1:00 PM on 3/21/2017:  According to ADA guidelines, hemoglobin A1C <7.0% represents  optimal control in non-pregnant diabetic patients.  Different  metrics may apply to specific populations.   Standards of " Medical Care in Diabetes - 2016.  For the purpose of screening for the presence of diabetes:  <5.7%     Consistent with the absence of diabetes  5.7-6.4%  Consistent with increasing risk for diabetes   (prediabetes)  >or=6.5%  Consistent with diabetes  Currently no consensus exists for use of hemoglobin A1C  for diagnosis of diabetes for children.        Pending Labs     Order Current Status    Specimen to Pathology - Surgery In process      Allergies as of 5/3/2017        Reactions    Keflex [Cephalexin] Other (See Comments)    Turns orange    Bactrim [Sulfamethoxazole-trimethoprim]     Generic version  Sulfa makes her sick      Advance Directives     An advance directive is a document which, in the event you are no longer able to make decisions for yourself, tells your healthcare team what kind of treatment you do or do not want to receive, or who you would like to make those decisions for you.  If you do not currently have an advance directive, [facility] encourages you to create one.  For more information call:  [phone]), [phone] ([phone]),  or log on to [email or web address].        Language Assistance Services     ATTENTION: Language assistance services are available, free of charge. Please call [phone].      ATENCIÓN: Si habla chelsea, tiene a lopez disposición servicios gratuitos de asistencia lingüística. Llame al [phone].     CHÚ Ý: N?u b?n nói Ti?ng Vi?t, có các d?ch v? h? tr? ngôn ng? mi?n phí dành cho b?n. G?i s? [phone].        Chronic Kindey Disease Education             Diabetes Discharge Instructions                                    [facility] complies with applicable Federal civil rights laws and does not discriminate on the basis of race, color, national origin, age, disability, or sex.

## 2017-05-01 NOTE — PROGRESS NOTES
Nursing Transfer Note      5/1/2017     Transfer To: 541a    Transfer via stretcher    Transfer with room air    Transported by PCT    Medicines sent: IVF, PNC    Chart send with patient: Yes    Notified: family in waiting room    Patient reassessed at: to be done by callie RN (date, time)

## 2017-05-01 NOTE — BRIEF OP NOTE
[facility]  Surgery Department  Operative Note    SUMMARY     Date of Procedure: 5/1/2017     Procedure: Procedure(s) (LRB):  REPLACEMENT-KNEE-TOTAL (Left)     Surgeon(s) and Role:     * Carlin Church MD - Resident - Assisting     * [facility] MD - Primary        Pre-Operative Diagnosis: Primary osteoarthritis of left knee [M17.12]    Post-Operative Diagnosis: Post-Op Diagnosis Codes:     * Primary osteoarthritis of left knee [M17.12]    Anesthesia: Spinal    Technical Procedures Used:  PS       Description of the Findings of the Procedure: Varus    Significant Surgical Tasks Conducted by the Assistant(s), if Applicable: closure    Complications: No    Estimated Blood Loss (EBL): 100cc             Implants:   Implant Name Type Inv. Item Serial No.  Lot No. LRB No. Used   PLUG BONE #5 - [id number]  PLUG BONE #5  [employer] KARLA. [id number] Left 1   SCREW HEX HEAD - [id number]  SCREW HEX HEAD  KOTURA,INC  Left 1   SCREW HEX HEAD 3.5X38MM - [id number]  SCREW HEX HEAD 3.5X38MM  THELMA,INC  Left 1   CEMENT BONE SIMPLE P INDIVID - [id number]  CEMENT BONE SIMPLE P INDIVID  [employer] KARLA. [id number] Left 1   CEMENT BONE SIMPLE P INDIVID - [id number]  CEMENT BONE SIMPLE P INDIVID  [employer] KARLA. [id number] Left 1   PERSONA The Personalized Knee System Femur Cemented Posterior Stabalized(PS)  Standard  Left     [id number] Left 1   PSN TIB STM 5 DEG SZ CL - [id number]  PSN TIB STM 5 DEG SZ CL  THELMA,INC [id number] Left 1   PATELLA PSN LILA POLY 8X29MM - [id number]  PATELLA PSN LILA POLY 8X29MM  THELMA,INC [id number] Left 1   PERSONA The Personalized Knee System Vivacit-E Highly Crosslinked Polyethylene Articular Surface Fixed Bearing Posterior Stabilized (PS)         [id number] Left 1       Specimens:   Specimen (12h ago through future)    Start     Ordered    05/01/17 0814  Specimen to Pathology - Surgery  Once     Comments:  1.  Bone and Soft Tissue Left Knee - Permanent    05/01/17 0814                   Condition: Good    Disposition: PACU - hemodynamically stable.    Attestation: I was present and scrubbed for the entire procedure.

## 2017-05-01 NOTE — ANESTHESIA RELEASE NOTE
"Anesthesia Release from PACU Note    Patient: Kaylee Rmoero    Procedure(s) Performed: Procedure(s) (LRB):  REPLACEMENT-KNEE-TOTAL (Left)    Anesthesia type: spinal    Post pain: Adequate analgesia    Post assessment: no apparent anesthetic complications, tolerated procedure well and no evidence of recall    Last Vitals:   Visit Vitals    /84    Pulse 85    Temp 36.1 °C (97 °F) (Axillary)    Resp (!) 22    Ht 5' 2" (1.575 m)    Wt 98 kg (216 lb)    SpO2 95%    Breastfeeding No    BMI 39.51 kg/m2       Post vital signs: stable    Level of consciousness: awake, alert  and oriented    Nausea/Vomiting: no nausea/no vomiting    Complications: none    Airway Patency: patent    Respiratory: unassisted    Cardiovascular: stable and blood pressure at baseline    Hydration: euvolemic  "

## 2017-05-01 NOTE — ANESTHESIA PROCEDURE NOTES
Left Ipack SS    Patient location during procedure: pre-op   Block not for primary anesthetic.  Reason for block: at surgeon's request and post-op pain management   Post-op Pain Location: left knee pain  Start time: 5/1/2017 6:40 AM  Timeout: 5/1/2017 6:16 AM   End time: 5/1/2017 6:45 AM  Staffing  Anesthesiologist: SIDNEY WILLIAMSON  Performed by: anesthesiologist   Preanesthetic Checklist  Completed: patient identified, site marked, surgical consent, pre-op evaluation, timeout performed, IV checked, risks and benefits discussed and monitors and equipment checked  Peripheral Block  Patient position: supine  Prep: ChloraPrep  Patient monitoring: heart rate, cardiac monitor, continuous pulse ox, continuous capnometry and frequent blood pressure checks  Block type: I PACK  Laterality: left  Injection technique: single shot  Needle  Needle type: Stimuplex   Needle gauge: 21 G  Needle length: 4 in  Needle localization: anatomical landmarks and ultrasound guidance   -ultrasound image captured on disc.  Assessment  Injection assessment: negative aspiration, negative parasthesia and local visualized surrounding nerve  Paresthesia pain: none  Heart rate change: no  Slow fractionated injection: yes  Medications:  Bolus administered: 20 mL of 0.25 ropivacaine  Epinephrine added: 3.75 mcg/mL (1/300,000)  Additional Notes  VSS.  DOSC RN monitoring vitals throughout procedure.  Patient tolerated procedure well.

## 2017-05-01 NOTE — ANESTHESIA POSTPROCEDURE EVALUATION
"Anesthesia Post Evaluation    Patient: Kaylee Romero    Procedure(s) Performed: Procedure(s) (LRB):  REPLACEMENT-KNEE-TOTAL (Left)    Final Anesthesia Type: spinal  Patient location during evaluation: PACU  Patient participation: Yes- Able to Participate  Level of consciousness: awake and alert and oriented  Post-procedure vital signs: reviewed and stable  Pain management: adequate  Airway patency: patent  PONV status at discharge: No PONV  Anesthetic complications: no      Cardiovascular status: blood pressure returned to baseline, hemodynamically stable and stable  Respiratory status: unassisted, room air and spontaneous ventilation  Hydration status: euvolemic  Follow-up not needed.        Visit Vitals    /84    Pulse 85    Temp 36.1 °C (97 °F) (Axillary)    Resp (!) 22    Ht 5' 2" (1.575 m)    Wt 98 kg (216 lb)    SpO2 95%    Breastfeeding No    BMI 39.51 kg/m2       Pain/Edwin Score: Pain Assessment Performed: Yes (5/1/2017 11:00 AM)  Presence of Pain: complains of pain/discomfort (5/1/2017 11:00 AM)  Pain Rating Prior to Med Admin: 7 (5/1/2017 12:10 PM)  Edwin Score: 9 (5/1/2017 11:00 AM)      "

## 2017-05-01 NOTE — PLAN OF CARE
Problem: Physical Therapy Goal  Goal: Physical Therapy Goal  Goals to be met by: 17     Patient will increase functional independence with mobility by performin. Supine to sit with Set-up [place]  2. Sit to supine with Set-up [place]  3. Sit to stand transfer with Stand-by Assistance  4. Gait x 150 feet with Stand-by Assistance using Rolling Walker.   5. Ascend/descend 3 stair with right Handrails Contact Guard Assistance  6. Lower extremity exercise program x 30 reps per handout, with independence  Outcome: Ongoing (interventions implemented as appropriate)  PT eval complete and POC initiated.

## 2017-05-01 NOTE — PLAN OF CARE
[facility]    HOME HEALTH ORDERS  FACE TO FACE ENCOUNTER    Patient Name: Kaylee Romero  YOB: 1945    PCP: Lexy Nicole MD   [address] [facility] [address]  PCP Phone Number: [phone]  PCP Fax: [phone]    Encounter Date: 05/01/2017    Admit to Home Health    Diagnoses:  Active Hospital Problems    Diagnosis  POA    *Primary osteoarthritis of left knee [M17.12]  Yes      Resolved Hospital Problems    Diagnosis Date Resolved POA   No resolved problems to display.       Future Appointments  Date Time Provider Department Center   5/16/2017 1:00 PM Rachel Murdock PA-C [facility] ORTHO Jordon palmira   7/6/2017 1:00 PM LAB, [facility] LAB [facility]   7/6/2017 1:15 PM SPECIMEN, [facility] SPECLAB [facility]   7/11/2017 1:00 PM PAYAL Gonzalez,ANPYesiC [facility] ENDODIA Jordon Ray     Follow-up Information     Follow up with Rachel Murdock PA-C. Go on 5/16/2017.    Specialty:  Orthopedic Surgery    Why:  For wound re-check    Contact information:    [id number]  [facility] [id number] [phone]              I have seen and examined this patient face to face today. My clinical findings that support the need for the home health skilled services and home bound status are the following:  Weakness/numbness causing balance and gait disturbance due to Joint Replacement making it taxing to leave home.    Allergies:  Review of patient's allergies indicates:   Allergen Reactions    Keflex [cephalexin] Other (See Comments)     Turns orange    Bactrim [sulfamethoxazole-trimethoprim]      Generic version  Sulfa makes her sick       Diet: diabetic diet    Activities: activity as tolerated    Nursing:   SN to complete comprehensive assessment including routine vital signs. Instruct on disease process and s/s of complications to report to MD. Follow specific home health arthoplasty protocol. Review/verify medication list sent home with the  patient at time of discharge  and instruct patient/caregiver as needed. If coumadin ordered, coumadin clinic to manage INR with INR draws 2x per week with a goal to maintain INR between 1.8 and 2.2. Frequency may be adjusted depending on start of care date.    Notify MD if SBP > 160 or < 90; DBP > 90 or < 50; HR > 120 or < 50; Temp > 101    Home Medical Equipment:  Walker, 3-1 bedside commode, transfer tub bench    CONSULTS:    Physical Therapy to evaluate and treat. Evaluate for home safety and equipment needs; Establish/upgrade home exercise program. Perform / instruct on therapeutic exercises, gait training, transfer training, and Range of Motion.    WOUND CARE ORDERS  Follow Home Health specific protocol.      Medications: Review discharge medications with patient and family and provide education.      Current Discharge Medication List      START taking these medications    Details   aspirin 325 MG tablet Take 1 tablet (325 mg total) by mouth 2 (two) times daily.  Qty: 60 tablet, Refills: 0      docusate sodium (COLACE) 100 MG capsule Take 1 capsule (100 mg total) by mouth 2 (two) times daily.  Qty: 60 capsule, Refills: 0      ondansetron (ZOFRAN) 8 MG tablet Take 1 tablet (8 mg total) by mouth every 8 (eight) hours as needed for Nausea.  Qty: 30 tablet, Refills: 0      oxycodone-acetaminophen (PERCOCET)  mg per tablet Take 1 tablet by mouth every 4 (four) hours as needed for Pain.  Qty: 90 tablet, Refills: 0         CONTINUE these medications which have NOT CHANGED    Details   !! ACCU-CHEK [facility]STCLIX [facility]       alcohol swabs (ALCOHOL PADS) PadM Apply 1 each topically as needed.  Qty: 11 each, Refills: 11    Associated Diagnoses: Type 2 diabetes mellitus not at goal      amlodipine (NORVASC) 10 MG tablet take 1 tablet by mouth every morning  Qty: 90 tablet, Refills: 3      blood sugar diagnostic (ONETOUCH VERIO) Strp 1 strip by Misc.(Non-Drug; Combo Route) route 2 (two) times daily.  Qty: 50 strip, Refills:  11    Associated Diagnoses: Type 2 diabetes mellitus not at goal      dextroamphetamine-amphetamine 10 mg Tab TK 1 T PO IN THE MORNING BID FOR 1 MONTH.  Refills: 0      dulaglutide (TRULICITY) 0.75 mg/0.5 mL PnIj Inject 0.75 mg into the skin once a week.  Qty: 4 Syringe, Refills: 11    Associated Diagnoses: Type 2 diabetes mellitus not at goal      hydrochlorothiazide (HYDRODIURIL) 12.5 MG Tab Take 1 tablet (12.5 mg total) by mouth once daily.  Qty: 30 tablet, Refills: 1      metformin (GLUCOPHAGE) 500 MG tablet Take 2 tablets (1,000 mg total) by mouth daily with breakfast.  Qty: 60 tablet, Refills: 11    Associated Diagnoses: Type 2 diabetes mellitus not at goal      metoprolol succinate (TOPROL-XL) 25 MG 24 hr tablet Take 1 tablet (25 mg total) by mouth every evening.  Qty: 90 tablet, Refills: 3      omeprazole (PRILOSEC) 40 MG capsule Take 1 capsule (40 mg total) by mouth once daily.  Qty: 90 capsule, Refills: 3      !! ONE TOUCH DELICA LANCETS 33 gauge Misc       cyanocobalamin (VITAMIN B-12) 1000 MCG tablet Take 1,000 mcg by mouth once daily.  Refills: 0      DIPHENHYDRAMINE HCL (WAL-DRYL ALLERGY ORAL) Take by mouth as needed (jong).      diphenoxylate-atropine 2.5-0.025 mg (LOMOTIL) 2.5-0.025 mg per tablet Take by mouth 3 (three) times daily as needed. 1 Tablet Oral Three times a day      naproxen sodium (ANAPROX) 220 MG tablet Take 440 mg by mouth as needed.      potassium chloride (MICRO-K) 10 MEQ CpSR take 1 capsule by mouth twice a day  Qty: 180 capsule, Refills: 3      valsartan (DIOVAN) 320 MG tablet take 1 tablet by mouth once daily  Qty: 90 tablet, Refills: 3       !! - Potential duplicate medications found. Please discuss with provider.      STOP taking these medications       ibuprofen (ADVIL,MOTRIN) 800 MG tablet Comments:   Reason for Stopping:         ibuprofen (ADVIL,MOTRIN) 800 MG tablet Comments:   Reason for Stopping:         cyclobenzaprine (FLEXERIL) 10 MG tablet Comments:   Reason for  Stopping:               I certify that this patient is confined to her home and needs intermittent skilled nursing care and physical therapy.

## 2017-05-02 LAB
POCT GLUCOSE: 124 MG/DL (ref 70–110)
POCT GLUCOSE: 189 MG/DL (ref 70–110)

## 2017-05-02 PROCEDURE — 94799 UNLISTED PULMONARY SVC/PX: CPT

## 2017-05-02 PROCEDURE — 99231 SBSQ HOSP IP/OBS SF/LOW 25: CPT | Mod: ,,, | Performed by: ANESTHESIOLOGY

## 2017-05-02 PROCEDURE — 25000003 PHARM REV CODE 250: Performed by: ANESTHESIOLOGY

## 2017-05-02 PROCEDURE — 97530 THERAPEUTIC ACTIVITIES: CPT

## 2017-05-02 PROCEDURE — 97116 GAIT TRAINING THERAPY: CPT

## 2017-05-02 PROCEDURE — 25000003 PHARM REV CODE 250: Performed by: PHYSICIAN ASSISTANT

## 2017-05-02 PROCEDURE — 63600175 PHARM REV CODE 636 W HCPCS: Performed by: PHYSICIAN ASSISTANT

## 2017-05-02 PROCEDURE — 11000001 HC ACUTE MED/SURG PRIVATE ROOM

## 2017-05-02 PROCEDURE — 63600175 PHARM REV CODE 636 W HCPCS: Performed by: ANESTHESIOLOGY

## 2017-05-02 PROCEDURE — 97110 THERAPEUTIC EXERCISES: CPT

## 2017-05-02 PROCEDURE — 25000003 PHARM REV CODE 250: Performed by: STUDENT IN AN ORGANIZED HEALTH CARE EDUCATION/TRAINING PROGRAM

## 2017-05-02 PROCEDURE — 97535 SELF CARE MNGMENT TRAINING: CPT

## 2017-05-02 RX ADMIN — AMLODIPINE BESYLATE 10 MG: 10 TABLET ORAL at 06:05

## 2017-05-02 RX ADMIN — ONDANSETRON 4 MG: 2 INJECTION INTRAMUSCULAR; INTRAVENOUS at 10:05

## 2017-05-02 RX ADMIN — OXYCODONE HYDROCHLORIDE 10 MG: 5 TABLET ORAL at 09:05

## 2017-05-02 RX ADMIN — FAMOTIDINE 20 MG: 20 TABLET, FILM COATED ORAL at 09:05

## 2017-05-02 RX ADMIN — ACETAMINOPHEN 1000 MG: 500 TABLET ORAL at 12:05

## 2017-05-02 RX ADMIN — POLYETHYLENE GLYCOL 3350 17 G: 17 POWDER, FOR SOLUTION ORAL at 09:05

## 2017-05-02 RX ADMIN — STANDARDIZED SENNA CONCENTRATE AND DOCUSATE SODIUM 1 TABLET: 8.6; 5 TABLET, FILM COATED ORAL at 09:05

## 2017-05-02 RX ADMIN — CYANOCOBALAMIN TAB 1000 MCG 1000 MCG: 1000 TAB at 09:05

## 2017-05-02 RX ADMIN — OXYCODONE HYDROCHLORIDE 10 MG: 5 TABLET ORAL at 05:05

## 2017-05-02 RX ADMIN — ASPIRIN 325 MG: 325 TABLET, DELAYED RELEASE ORAL at 09:05

## 2017-05-02 RX ADMIN — HYDROCHLOROTHIAZIDE 12.5 MG: 12.5 TABLET ORAL at 09:05

## 2017-05-02 RX ADMIN — POTASSIUM CHLORIDE 10 MEQ: 750 CAPSULE, EXTENDED RELEASE ORAL at 09:05

## 2017-05-02 RX ADMIN — INSULIN ASPART 2 UNITS: 100 INJECTION, SOLUTION INTRAVENOUS; SUBCUTANEOUS at 09:05

## 2017-05-02 RX ADMIN — OXYCODONE HYDROCHLORIDE 10 MG: 5 TABLET ORAL at 12:05

## 2017-05-02 RX ADMIN — PANTOPRAZOLE SODIUM 40 MG: 40 TABLET, DELAYED RELEASE ORAL at 09:05

## 2017-05-02 RX ADMIN — OXYCODONE HYDROCHLORIDE 10 MG: 5 TABLET ORAL at 02:05

## 2017-05-02 RX ADMIN — PREGABALIN 75 MG: 75 CAPSULE ORAL at 09:05

## 2017-05-02 RX ADMIN — ACETAMINOPHEN 1000 MG: 500 TABLET ORAL at 05:05

## 2017-05-02 RX ADMIN — METOPROLOL SUCCINATE 25 MG: 25 TABLET, EXTENDED RELEASE ORAL at 09:05

## 2017-05-02 RX ADMIN — CEFAZOLIN SODIUM 2 G: 2 SOLUTION INTRAVENOUS at 02:05

## 2017-05-02 RX ADMIN — VALSARTAN 320 MG: 160 TABLET ORAL at 09:05

## 2017-05-02 RX ADMIN — ROPIVACAINE HYDROCHLORIDE 8 ML/HR: 2 INJECTION, SOLUTION EPIDURAL; INFILTRATION at 07:05

## 2017-05-02 NOTE — PT/OT/SLP PROGRESS
Physical Therapy  Treatment    Kaylee Romero   MRN: [id number]   Admitting Diagnosis: Primary osteoarthritis of left knee    PT Received On: 17  PT Start Time: 706     PT Stop Time: 817    PT Total Time (min): 71 min       Billable Minutes:  Gait [id number], Therapeutic Activity 20 and Therapeutic Exercise 21    Treatment Type: Treatment  PT/PTA: PTA     PTA Visit Number: 1       General Precautions: Standard, fall  Orthopedic Precautions: RLE weight bearing as tolerated   Braces:           Subjective:  Communicated with NSG prior to session.  It takes me a while to process everything    Pain Ratin/10  Location - Side: Left     Location: knee          Objective:   Patient found with: Polar ice, FCD, BIPAP    Functional Mobility:  Bed Mobility:   Supine to Sit: Minimum Assistance  Sit to Supine: Minimum Assistance    Transfers:  Sit <> Stand Assistance: Minimum Assistance  Sit <> Stand Assistive Device: Rolling Walker  Chair<> Mat Technique: Stand Pivot  Chair<>Mat Assistance: Minimum Assistance  Chair <> Mat Assistive Device: Rolling Walker    Gait:   Gait Distance: 20 feet x2 trials  Assistance 1: Minimum assistance, Contact Guard Assistance  Gait Assistive Device: Rolling walker  Gait Pattern: 3-point gait  Gait Deviation(s): decreased chantell, increased time in double stance, decreased velocity of limb motion, decreased step length, decreased stride length, forward lean, foot flat    Stairs:      Balance:   Static Sit: GOOD+: Takes MAXIMAL challenges from all directions.    Dynamic Sit: GOOD: Maintains balance through MODERATE excursions of active trunk movement  Static Stand: FAIR: Maintains without assist but unable to take challenges  Dynamic stand: FAIR: Needs CONTACT GUARD during gait     Therapeutic Activities and Exercises:  Patient performed TKR protocol exercises x10-15 reps  Patient given HEP  Patient educated on safety with OOB mobility       AM-PAC 6 CLICK MOBILITY  How much help from  another person does this patient currently need?   1 = Unable, Total/Dependent Assistance  2 = A lot, Maximum/Moderate Assistance  3 = A little, Minimum/Contact Guard/Supervision  4 = None, Modified [place]/Independent    Turning over in bed (including adjusting bedclothes, sheets and blankets)?: 3  Moving from lying on back to sitting on the side of the bed?: 3  Moving to and from a bed to a chair (including a wheelchair)?: 3  Need to walk in hospital room?: 3  Climbing 3-5 steps with a railing?: 3    AM-PAC Raw Score CMS G-Code Modifier Level of Impairment Assistance   6 % Total / Unable   7 - 9 CM 80 - 100% Maximal Assist   10 - 14 CL 60 - 80% Moderate Assist   15 - 19 CK 40 - 60% Moderate Assist   20 - 22 CJ 20 - 40% Minimal Assist   23 CI 1-20% SBA / CGA   24 CH 0% Independent/ Mod I     Patient left up in chair with all lines intact.    Assessment:  Kaylee Romero is a 72 y.o. female with a medical diagnosis of Primary osteoarthritis of left knee and presents with decrease strength, mobility, balance and ROM. Patient tolerated increase distance with gait training well.  Noted slow movements with al mobility and transfers.  Patient progressing well toward goals.  Patient would benefit from further IP therapy.    Rehab identified problem list/impairments: Rehab identified problem list/impairments: weakness, gait instability, impaired endurance, impaired functional mobilty, impaired balance, impaired self care skills, pain, decreased ROM, decreased lower extremity function    Rehab potential is good.    Activity tolerance: Fair    Discharge recommendations: Discharge Facility/Level Of Care Needs: home health PT     Barriers to discharge: Barriers to Discharge: Inaccessible home environment    Equipment recommendations: Equipment Needed After Discharge: bedside commode, walker, rolling     GOALS:   Physical Therapy Goals        Problem: Physical Therapy Goal    Goal Priority Disciplines Outcome  Goal Variances Interventions   Physical Therapy Goal     PT/OT, PT Ongoing (interventions implemented as appropriate)     Description:  Goals to be met by: 17     Patient will increase functional independence with mobility by performin. Supine to sit with Set-up [place]  2. Sit to supine with Set-up [place]  3. Sit to stand transfer with Stand-by Assistance  4. Gait  x 150 feet with Stand-by Assistance using Rolling Walker.   5. Ascend/descend 3 stair with right Handrails Contact Guard Assistance  6. Lower extremity exercise program x 30 reps per handout, with independence                PLAN:    Patient to be seen BID  to address the above listed problems via gait training, therapeutic activities, therapeutic exercises, neuromuscular re-education  Plan of Care expires:    Plan of Care reviewed with: patient, spouse         Jose Oseguera II, PTA  2017

## 2017-05-02 NOTE — PT/OT/SLP PROGRESS
"Occupational Therapy  Treatment    Kaylee Romero   MRN: [id number]   Admitting Diagnosis: Primary osteoarthritis of left knee    OT Date of Treatment: 05/02/17   OT Start Time: 1357  OT Stop Time: 1450  OT Total Time (min): 53 min    Billable Minutes:  Self Care/Home Management 30 minutes and Therapeutic Activity 23 minutes    General Precautions: Standard, fall  Orthopedic Precautions: LLE weight bearing as tolerated    Subjective:  Communicated with RN prior to session.  "I've just got a lot on my mind. Things that will take away from my recovery. It's not healthy, it's not helpful, and it's deliberate."    Pain Rating:  (did not rate, but reports decreased pain since last PT session 30 min prior, for which she rated a 13/10)  Location - Side: Left  Location: knee  Pain Addressed: Pre-medicate for activity, Cessation of Activity    Objective:  Patient found with: Polar ice, FCD, perineural catheter, pt found UIC and agreeable to therapy     Functional Mobility:  Bed Mobility:   Found UIC, left UIC    Transfers:  Sit <> Stand Assistance: Stand By Assistance  Sit <> Stand Assistive Device: Rolling Walker  Bed <> Chair Technique: Stand Pivot  Bed <> Chair Transfer Assistance: Stand By Assistance  Bed <> Chair Assistive Device: Rolling Walker    Functional Ambulation: SBA with RW    Activities of Daily Living:  Feeding Level of Assistance: Set-up Assistance  LE Dressing Level of Assistance: Stand by assistance (doff/don socks while edge of seat)  Grooming Position: Standing at sink  Grooming Level of Assistance: Stand by assistance (brush teeth and hair)    Balance:   Static Sit: NORMAL: No deviations seen in posture held statically  Dynamic Sit: GOOD: Maintains balance through MODERATE excursions of active trunk movement  Static Stand: GOOD: Takes MODERATE challenges from all directions  Dynamic stand: GOOD-: Needs SUPERVISION only during gait and able to self right with moderate     Therapeutic Activities and " Exercises:  Pt ed re OT role and POC. Pt performed sit to stand t/f with SBA and RW and ambulated to the bathroom. Pt stood at sink and brushed teeth and hair with no LOB. Pt returned to the room and ambulated into the hallway and back to the chair. Pt doff/donned socks with SBA.    AM-PAC 6 CLICK ADL   How much help from another person does this patient currently need?   1 = Unable, Total/Dependent Assistance  2 = A lot, Maximum/Moderate Assistance  3 = A little, Minimum/Contact Guard/Supervision  4 = None, Modified [place]/Independent    Putting on and taking off regular lower body clothing? : 3  Bathing (including washing, rinsing, drying)?: 3  Toileting, which includes using toilet, bedpan, or urinal? : 3  Putting on and taking off regular upper body clothing?: 3  Taking care of personal grooming such as brushing teeth?: 3  Eating meals?: 4  Total Score: 19     AM-PAC Raw Score CMS G-Code Modifier Level of Impairment Assistance   6 % Total / Unable   7 - 9 CM 80 - 100% Maximal Assist   10 - 14 CL 60 - 80% Moderate Assist   15 - 19 CK 40 - 60% Moderate Assist   20 - 22 CJ 20 - 40% Minimal Assist   23 CI 1-20% SBA / CGA   24 CH 0% Independent/ Mod I     Patient left up in chair with all lines intact and call button in reach    ASSESSMENT:  Kaylee Romero is a 72 y.o. female with a medical diagnosis of Primary osteoarthritis of left knee and presents with the impairments listed below. Pt is pleasant, but is preoccupied with her other aspects of her life such as her job and home life. Pt requires redirection to improve safety, but demo no LOB and is demo improved self care skills with SBA doff/donning of socks. Pt stood at sink with no LOB. Pt requires increased time for mobility, but is able to ambulate with no LOB and no contact from therapist (SBA for safety). Pt would benefit from cont OT to maximize independence in self care and mobility.    Rehab identified problem list/impairments: Rehab  identified problem list/impairments: weakness, impaired endurance, impaired self care skills, impaired functional mobilty, gait instability, impaired balance, decreased lower extremity function, pain, decreased ROM    Rehab potential is good.    Activity tolerance: Fair    Discharge recommendations: Discharge Facility/Level Of Care Needs: home with home health     Barriers to discharge: Barriers to Discharge: Inaccessible home environment    Equipment recommendations: bedside commode, bath bench, walker, rolling     GOALS:   Occupational Therapy Goals        Problem: Occupational Therapy Goal    Goal Priority Disciplines Outcome Interventions   Occupational Therapy Goal     OT, PT/OT Ongoing (interventions implemented as appropriate)    Description:  Goals to be met by: 05/08    Patient will increase functional independence with ADLs by performing:    UE Dressing with Supervision.  LE Dressing with Supervision with AD as needed.  Grooming while standing with Supervision.  Toileting from bedside commode with Supervision for hygiene and clothing management.   Stand pivot transfers with Supervision.  Toilet transfer to bedside commode with Supervision.                   Plan:  Patient to be seen   to address the above listed problems via self-care/home management, therapeutic activities, therapeutic exercises  Plan of Care expires:    Plan of Care reviewed with: patient, spouse    INGRID Sim  5/2/2017  Pager: [phone]

## 2017-05-02 NOTE — PLAN OF CARE
AAO X 3. VSS, No Falls noted. Fall precautions remain. Skin Intact. Pt encouraged to eat and drink. Pain assessed. Pain Medication given. Pt call light in reach, bed down and locked. Will continue to monitor.

## 2017-05-02 NOTE — PLAN OF CARE
IV SL at 0600 Met      Patient out of bed TID Met      Patient support trained by PT/OT/nursing Met      Patient is wearing fall risk band Met      Signs and Symptoms of Listed Potential Problems Will be Absent, Minimized or Managed (Diabetes, Type 2) Ongoing (interventions implemented as appropriate)

## 2017-05-02 NOTE — PLAN OF CARE
9:59 AM. Informed pt is unable to receive services from [facility]. SW sent HH referral to [facility] via right [facility] with ISIDRA as of tomorrow.      DME needs completed by CM.    11:12 AM. Pt accepted with [facility] via [facility] care.       MAURICIO Barcenas LMSW  [id number]

## 2017-05-02 NOTE — PROGRESS NOTES
[facility]  Orthopedics  Progress Note    Patient Name: Kaylee Romero  MRN: [id number]  Admission Date: 5/1/2017  Hospital Length of Stay: 1 days  Attending Provider: [facility] Jr., MD  Primary Care Provider: Lexy Nicole MD  Follow-up For: Procedure(s) (LRB):  REPLACEMENT-KNEE-TOTAL (Left)    Post-Operative Day: 1 Day Post-Op  Subjective:     Principal Problem:Primary osteoarthritis of left knee    Principal Orthopedic Problem: knee arthritis    Interval History: POD1 L TKA. No acute events overnight. Denies chest pain, SOB, n, v, headache. Pain well controlled. Tolerating PO well.      Review of patient's allergies indicates:   Allergen Reactions    Keflex [cephalexin] Other (See Comments)     Turns orange    Bactrim [sulfamethoxazole-trimethoprim]      Generic version  Sulfa makes her sick       Current Facility-Administered Medications   Medication    0.9%  NaCl infusion    acetaminophen tablet 1,000 mg    amlodipine tablet 10 mg    aspirin EC tablet 325 mg    bisacodyl suppository 10 mg    cyanocobalamin tablet 1,000 mcg    dextrose 50% injection 12.5 g    dextrose 50% injection 25 g    famotidine tablet 20 mg    glucagon (human recombinant) injection 1 mg    glucose chewable tablet 16 g    glucose chewable tablet 24 g    hydrochlorothiazide tablet 12.5 mg    insulin aspart pen 1-10 Units    metoprolol succinate (TOPROL-XL) 24 hr tablet 25 mg    morphine injection 2 mg    morphine injection 2 mg    naloxone 0.4 mg/mL injection 0.02 mg    ondansetron injection 4 mg    oxycodone immediate release tablet 10 mg    oxycodone immediate release tablet 5 mg    pantoprazole EC tablet 40 mg    polyethylene glycol packet 17 g    potassium chloride CR capsule 10 mEq    pregabalin capsule 75 mg    ramelteon tablet 8 mg    ropivacaine (PF) 2 mg/ml (0.2%) infusion    senna-docusate 8.6-50 mg per tablet 1 tablet    sodium chloride 0.9% flush 3 mL    valsartan  "tablet 320 mg     Objective:     Vital Signs (Most Recent):  Temp: 97 °F (36.1 °C) (05/02/17 0400)  Pulse: 78 (05/02/17 0400)  Resp: 18 (05/02/17 0400)  BP: (!) 146/81 (05/02/17 0400)  SpO2: 96 % (05/02/17 0400) Vital Signs (24h Range):  Temp:  [96.3 °F (35.7 °C)-97.8 °F (36.6 °C)] 97 °F (36.1 °C)  Pulse:  [61-96] 78  Resp:  [14-22] 18  SpO2:  [95 %-100 %] 96 %  BP: (119-166)/(61-95) 146/81     Weight: 98 kg (216 lb)  Height: 5' 2" (157.5 cm)  Body mass index is 39.51 kg/(m^2).      Intake/Output Summary (Last 24 hours) at 05/02/17 0607  Last data filed at 05/01/17 1827   Gross per 24 hour   Intake             2176 ml   Output             1100 ml   Net             1076 ml       General    Constitutional: She is oriented to person, place, and time.   Neurological: She is alert and oriented to person, place, and time.   Psychiatric: She has a normal mood and affect.           LLE: dressings c/d/i, NVI distally, brisk capillary refill.    Significant Labs: All pertinent labs within the past 24 hours have been reviewed.    Significant Imaging: knee x-ray: well aligned prosthesis    Assessment/Plan:     * Primary osteoarthritis of left knee  POD1 L TKA  - WBAT RLE  - PT/OT  - Pain control per anesthesia  - Likely discharge home today pending PT progress    Hypertension  Continue home meds    Type 2 diabetes mellitus not at goal  Sliding scale insulin        Carlin Rose MD  Orthopedics  [facility]  "

## 2017-05-02 NOTE — ADDENDUM NOTE
Addendum  created 05/02/17 0910 by Kary Boss MD    Charge Capture section accepted, Sign clinical note
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
## 2017-05-02 NOTE — PLAN OF CARE
Problem: Physical Therapy Goal  Goal: Physical Therapy Goal  Goals to be met by: 17     Patient will increase functional independence with mobility by performin. Supine to sit with Set-up [place]  2. Sit to supine with Set-up [place]  3. Sit to stand transfer with Stand-by Assistance  4. Gait x 150 feet with Stand-by Assistance using Rolling Walker.   5. Ascend/descend 3 stair with right Handrails Contact Guard Assistance  6. Lower extremity exercise program x 30 reps per handout, with independence   Patient goals remain appropriate.  Patient will continue to benefit from further PT services.  Jose Oseguera, PTA

## 2017-05-02 NOTE — NURSING
Pt discharged in stable condition, discharge instructions and prescriptions given, pt verbalized understanding. Surgical site intact.pt waiting on pm pt and on q. Elvia Barragan RN

## 2017-05-02 NOTE — ANESTHESIA POST-OP PAIN MANAGEMENT
Acute Pain Service and Perioperative Surgical Home Progress Note    HPI  Kaylee Romero is a 72 y.o., female,     Interval history    Pain well controlled over night with minimal supplemental meds.  Tolerating PO with +flatus.  No chest pain, headaches, SOB, or abdominal pain    Surgery:  Procedure(s) (LRB):  REPLACEMENT-KNEE-TOTAL (Left)    Post Op Day #: 1    Catheter type: Perineural Adductor Canal    Infusion type: Ropivacaine 0.2%  8 ml/hr basal    Problem List:    Active Hospital Problems    Diagnosis  POA    *Primary osteoarthritis of left knee [M17.12]  Yes    Type 2 diabetes mellitus not at goal [E11.9]  Yes    Hypertension [I10]  Yes      Resolved Hospital Problems    Diagnosis Date Resolved POA   No resolved problems to display.       Subjective:       General appearance of alert, oriented, no complaints   Pain with rest: 3    Numbers   Pain with movement: 5    Numbers   Side Effects    1. Pruritis No    2. Nausea No    3. Motor Blockade No, 0=Ability to raise lower extremities off bed    4. Sedation No, 1=awake and alert    Schedule Medications:    acetaminophen  1,000 mg Oral Q6H    amlodipine  10 mg Oral QAM    aspirin  325 mg Oral BID    cyanocobalamin  1,000 mcg Oral Daily    famotidine  20 mg Oral BID    hydrochlorothiazide  12.5 mg Oral Daily    metoprolol succinate  25 mg Oral Daily    pantoprazole  40 mg Oral Daily    polyethylene glycol  17 g Oral Daily    potassium chloride  10 mEq Oral BID    pregabalin  75 mg Oral QHS    senna-docusate 8.6-50 mg  1 tablet Oral BID    valsartan  320 mg Oral Daily        Continuous Infusions:   sodium chloride 0.9% 150 mL/hr at 05/01/17 0937    ropivacaine (PF) 2 mg/ml (0.2%) 8 mL/hr (05/02/17 0712)        PRN Medications:  bisacodyl, dextrose 50%, dextrose 50%, glucagon (human recombinant), glucose, glucose, insulin aspart, morphine, morphine, naloxone, ondansetron, oxycodone, oxycodone, ramelteon, sodium chloride 0.9%        Antibiotics:  Antibiotics     None             Objective:     Catheter site clean, dry, intact          Vital Signs (Most Recent):  Temp: 36.1 °C (97 °F) (05/02/17 0400)  Pulse: 78 (05/02/17 0400)  Resp: 18 (05/02/17 0400)  BP: (!) 146/81 (05/02/17 0400)  SpO2: 96 % (05/02/17 0400) Vital Signs Range (Last 24H):  Temp:  [35.7 °C (96.3 °F)-36.6 °C (97.8 °F)]   Pulse:  [61-94]   Resp:  [14-22]   BP: (119-166)/(71-95)   SpO2:  [95 %-100 %]          I & O (Last 24H):  Intake/Output Summary (Last 24 hours) at 05/02/17 0724  Last data filed at 05/01/17 1827   Gross per 24 hour   Intake             2176 ml   Output             1100 ml   Net             1076 ml       Physical Exam:    GA: Alert, comfortable, no acute distress.   Pulmonary: Clear to auscultation A/P/L. No wheezing, crackles, or rhonchi.  Cardiac: RRR S1 & S2 w/o rubs/murmurs/gallops.   Abdominal:Bowel sounds present. No tenderness to palpation or distension. No appreciable hepatosplenomegaly.   Skin: No jaundice, rashes, or visible lesions.    Laboratory:  CBC: No results for input(s): WBC, RBC, HGB, HCT, PLT, MCV, MCH, MCHC in the last 72 hours.    BMP: Recent Labs      05/01/17   0911   NA  137   K  4.3   CO2  25   CL  105   BUN  18   CREATININE  0.9   GLU  197*   CALCIUM  8.7       No results for input(s): INR, PROTIME, APTT in the last 72 hours.    Invalid input(s): PT      Anticoagulant dose     Assessment:         Pain control adequate    Plan:     1) Pain:    Adductor canal perineural catheter in place and infusing. Good level. Multimodal pain regimen with acetaminophen,  Lyrica, and prn oxycodone given  Will continue to monitor. Plan to D/C perineural catheter in AM or home with On-Q if patient discharged today.   2) KAMRAN: on home CPAP at night with adjusted PRN   3)DM: blood sugar in low 200s, on oral control at home,continue moderate SSI   4) HTN: on home medications, Systolics 120-140   5) GERD: well controlled on home meds   6) FEN/GI:  Tolerating liquids, advance diet as tolerated. + flatus. - BM.   7) Dispo: Pt working well with PT/OT. Case management and SW for placement. Plan for D/C tomorrow afternoon or possibly today if patient works well with PT and meets goals.          Evaluator Robbie Espinal MD                I have seen the patient, reviewed the Resident's assessment and plan. I have personally interviewed and examined the patient at bedside and: agree with the findings.   D/c home today with home health and onq

## 2017-05-02 NOTE — PLAN OF CARE
"Addendum on 5/3/17 at 1023: Patient not discharged on 5/2/17 due to PT recs- "Patient unable to perform stair climbing secondary to pain. Patient progressing well toward goals. Patient would benefit from further IP therapy". Plans to discharge patient home today with [facility].    POD 1 s/p left TKA. PT/OT recommended HH PT and DME. Plans to discharge patient home today with [facility]. See CM notes regarding DME delivery. Patient's spouse is present at the bedside. Patient and family verbalized understanding of today's discharge plan. All questions and concerns addressed. SW and CM will continue to follow for any additional needs. Discharge and follow-up instructions to be completed by the bedside nurse.    Future Appointments  Date Time Provider Department Center   5/16/2017 1:00 PM Rachel Murdock PA-C [facility] ORTHO [facility]   [facility]   7/6/2017 1:15 PM SPECIMEN, [facility] SPECLAB [facility]   7/11/2017 1:00 PM PAYAL Gonzalez,ANP-C [facility] ENDODIA [facility]ordon y      05/02/17 1024   Final Note   Assessment Type Final Discharge Note   Discharge Disposition Home-Health  ([facility])   Discharge planning education complete? Yes   What phone number can be called within the next 1-3 days to see how you are doing after discharge? ([phone])   Hospital Follow Up  Appt(s) scheduled? Yes   Discharge plans and expectations educations in teach back method with documentation complete? Yes   Offered [facility] -- Bedside Delivery? Yes   [facility] n/a   Referral to Outpatient Case Management complete? n/a   Referral to / orders for Home Health Complete? Yes   30 day supply of medicines given at discharge, if documented non-compliance / non-adherence? n/a   Any social issues identified prior to discharge? No   Did you assess the readiness or willingness of the family or caregiver to support self " management of care? Yes

## 2017-05-02 NOTE — PLAN OF CARE
POD 1 s/p left TKA. PT/OT ordered to eval and treat. PT/OT recommending HH PT and DME. Patient currently lives with her spouse. Patient's spouse is present at the bedside. Patient has good family support. CM completed discharge assessment and planning with patient. Patient and family verbalized understanding. All questions and concerns addressed. SW and CM will continue to follow for any additional needs. Plan A to discharge home with home health as soon as medically stable. Plan B to discharge home with family support and plans for outpatient rehab. Patient requested [facility].    PCP: Lexy Nicole MD    Ph[facility]   [facility] [address] [address]  Phone: [phone] Fax: [phone]    [facility] [address] [place] [address] AT [address] [address]  Phone: [phone] Fax: [phone]    Payor: HUMANA / Plan: HUMANA  PPO / Product Type: PPO /      05/02/17 0830   Discharge Assessment   Assessment Type Discharge Planning Assessment   Confirmed/corrected address and phone number on facesheet? Yes   Assessment information obtained from? Patient;Caregiver;Medical Record   Expected Length of Stay (days) 2   Communicated expected length of stay with patient/caregiver yes   Prior to hospitilization cognitive status: Alert/Oriented   Prior to hospitalization functional status: Independent   Current cognitive status: Alert/Oriented   Current Functional Status: Assistive Equipment   Arrived From home or self-care   Lives With spouse   Able to Return to Prior Arrangements yes   Is patient able to care for self after discharge? Yes   How many people do you have in your home that can help with your care after discharge? 1   Who are your caregiver(s) and their phone number(s)? spouse- [place] Nick [phone], [phone]   Patient's perception of  discharge disposition home health   Readmission Within The Last 30 Days no previous admission in last 30 days   Patient currently being followed by outpatient case management? No   Patient currently receives home health services? No   Does the patient currently use HME? No   Patient currently receives private duty nursing? No   Patient currently receives any other outside agency services? No   Equipment Currently Used at Home none   Do you have any problems affording any of your prescribed medications? No   Is the patient taking medications as prescribed? yes   Do you have any financial concerns preventing you from receiving the healthcare you need? No   Does the patient have transportation to healthcare appointments? Yes   Transportation Available family or friend will provide  (spouse)   On Dialysis? No   Does the patient receive services at the [facility]mad[facility] Clinic? No   Are there any open cases? No   Discharge Plan A Home Health;Home with family   Discharge Plan B Home with family;Other  (outpatient rehab)   Patient/Family In Agreement With Plan yes

## 2017-05-02 NOTE — SUBJECTIVE & OBJECTIVE
Principal Problem:Primary osteoarthritis of left knee    Principal Orthopedic Problem: knee arthritis    Interval History: POD1 L TKA. No acute events overnight. Denies chest pain, SOB, n, v, headache. Pain well controlled. Tolerating PO well.      Review of patient's allergies indicates:   Allergen Reactions    Keflex [cephalexin] Other (See Comments)     Turns orange    Bactrim [sulfamethoxazole-trimethoprim]      Generic version  Sulfa makes her sick       Current Facility-Administered Medications   Medication    0.9%  NaCl infusion    acetaminophen tablet 1,000 mg    amlodipine tablet 10 mg    aspirin EC tablet 325 mg    bisacodyl suppository 10 mg    cyanocobalamin tablet 1,000 mcg    dextrose 50% injection 12.5 g    dextrose 50% injection 25 g    famotidine tablet 20 mg    glucagon (human recombinant) injection 1 mg    glucose chewable tablet 16 g    glucose chewable tablet 24 g    hydrochlorothiazide tablet 12.5 mg    insulin aspart pen 1-10 Units    metoprolol succinate (TOPROL-XL) 24 hr tablet 25 mg    morphine injection 2 mg    morphine injection 2 mg    naloxone 0.4 mg/mL injection 0.02 mg    ondansetron injection 4 mg    oxycodone immediate release tablet 10 mg    oxycodone immediate release tablet 5 mg    pantoprazole EC tablet 40 mg    polyethylene glycol packet 17 g    potassium chloride CR capsule 10 mEq    pregabalin capsule 75 mg    ramelteon tablet 8 mg    ropivacaine (PF) 2 mg/ml (0.2%) infusion    senna-docusate 8.6-50 mg per tablet 1 tablet    sodium chloride 0.9% flush 3 mL    valsartan tablet 320 mg     Objective:     Vital Signs (Most Recent):  Temp: 97 °F (36.1 °C) (05/02/17 0400)  Pulse: 78 (05/02/17 0400)  Resp: 18 (05/02/17 0400)  BP: (!) 146/81 (05/02/17 0400)  SpO2: 96 % (05/02/17 0400) Vital Signs (24h Range):  Temp:  [96.3 °F (35.7 °C)-97.8 °F (36.6 °C)] 97 °F (36.1 °C)  Pulse:  [61-96] 78  Resp:  [14-22] 18  SpO2:  [95 %-100 %] 96 %  BP:  "(119166)/(61-95) 146/81     Weight: 98 kg (216 lb)  Height: 5' 2" (157.5 cm)  Body mass index is 39.51 kg/(m^2).      Intake/Output Summary (Last 24 hours) at 05/02/17 0607  Last data filed at 05/01/17 1827   Gross per 24 hour   Intake             2176 ml   Output             1100 ml   Net             1076 ml       General    Constitutional: She is oriented to person, place, and time.   Neurological: She is alert and oriented to person, place, and time.   Psychiatric: She has a normal mood and affect.           LLE: dressings c/d/i, NVI distally, brisk capillary refill.    Significant Labs: All pertinent labs within the past 24 hours have been reviewed.    Significant Imaging: knee x-ray: well aligned prosthesis  "

## 2017-05-02 NOTE — ASSESSMENT & PLAN NOTE
POD1 L TKA  - WBAT RLE  - PT/OT  - Pain control per anesthesia  - Likely discharge home today pending PT progress

## 2017-05-02 NOTE — PLAN OF CARE
Problem: Occupational Therapy Goal  Goal: Occupational Therapy Goal  Goals to be met by: 05/08    Patient will increase functional independence with ADLs by performing:    UE Dressing with Supervision.  LE Dressing with Supervision with AD as needed.  Grooming while standing with Supervision.  Toileting from bedside commode with Supervision for hygiene and clothing management.   Stand pivot transfers with Supervision.  Toilet transfer to bedside commode with Supervision.     Outcome: Ongoing (interventions implemented as appropriate)  Goals remain appropriate. Cont POC.     INGRID Sim  5/2/2017  Pager: [phone]

## 2017-05-03 VITALS
SYSTOLIC BLOOD PRESSURE: 146 MMHG | BODY MASS INDEX: 39.75 KG/M2 | HEIGHT: 62 IN | DIASTOLIC BLOOD PRESSURE: 82 MMHG | OXYGEN SATURATION: 99 % | TEMPERATURE: 98 F | RESPIRATION RATE: 17 BRPM | WEIGHT: 216 LBS | HEART RATE: 97 BPM

## 2017-05-03 LAB — POCT GLUCOSE: 146 MG/DL (ref 70–110)

## 2017-05-03 PROCEDURE — 25000003 PHARM REV CODE 250: Performed by: ANESTHESIOLOGY

## 2017-05-03 PROCEDURE — 97116 GAIT TRAINING THERAPY: CPT

## 2017-05-03 PROCEDURE — 25000003 PHARM REV CODE 250: Performed by: PHYSICIAN ASSISTANT

## 2017-05-03 PROCEDURE — 97110 THERAPEUTIC EXERCISES: CPT

## 2017-05-03 PROCEDURE — 99231 SBSQ HOSP IP/OBS SF/LOW 25: CPT | Mod: ,,, | Performed by: ANESTHESIOLOGY

## 2017-05-03 PROCEDURE — 97535 SELF CARE MNGMENT TRAINING: CPT

## 2017-05-03 PROCEDURE — 25000003 PHARM REV CODE 250: Performed by: STUDENT IN AN ORGANIZED HEALTH CARE EDUCATION/TRAINING PROGRAM

## 2017-05-03 PROCEDURE — 63600175 PHARM REV CODE 636 W HCPCS: Performed by: PHYSICIAN ASSISTANT

## 2017-05-03 PROCEDURE — 97530 THERAPEUTIC ACTIVITIES: CPT

## 2017-05-03 RX ADMIN — ACETAMINOPHEN 1000 MG: 500 TABLET ORAL at 12:05

## 2017-05-03 RX ADMIN — HYDROCHLOROTHIAZIDE 12.5 MG: 12.5 TABLET ORAL at 08:05

## 2017-05-03 RX ADMIN — OXYCODONE HYDROCHLORIDE 10 MG: 5 TABLET ORAL at 05:05

## 2017-05-03 RX ADMIN — VALSARTAN 320 MG: 160 TABLET ORAL at 08:05

## 2017-05-03 RX ADMIN — CYANOCOBALAMIN TAB 1000 MCG 1000 MCG: 1000 TAB at 08:05

## 2017-05-03 RX ADMIN — ACETAMINOPHEN 1000 MG: 500 TABLET ORAL at 05:05

## 2017-05-03 RX ADMIN — OXYCODONE HYDROCHLORIDE 10 MG: 5 TABLET ORAL at 09:05

## 2017-05-03 RX ADMIN — POTASSIUM CHLORIDE 10 MEQ: 750 CAPSULE, EXTENDED RELEASE ORAL at 08:05

## 2017-05-03 RX ADMIN — METOPROLOL SUCCINATE 25 MG: 25 TABLET, EXTENDED RELEASE ORAL at 08:05

## 2017-05-03 RX ADMIN — AMLODIPINE BESYLATE 10 MG: 10 TABLET ORAL at 07:05

## 2017-05-03 RX ADMIN — POLYETHYLENE GLYCOL 3350 17 G: 17 POWDER, FOR SOLUTION ORAL at 08:05

## 2017-05-03 RX ADMIN — PANTOPRAZOLE SODIUM 40 MG: 40 TABLET, DELAYED RELEASE ORAL at 08:05

## 2017-05-03 RX ADMIN — OXYCODONE HYDROCHLORIDE 10 MG: 5 TABLET ORAL at 08:05

## 2017-05-03 RX ADMIN — STANDARDIZED SENNA CONCENTRATE AND DOCUSATE SODIUM 1 TABLET: 8.6; 5 TABLET, FILM COATED ORAL at 08:05

## 2017-05-03 RX ADMIN — FAMOTIDINE 20 MG: 20 TABLET, FILM COATED ORAL at 08:05

## 2017-05-03 RX ADMIN — OXYCODONE HYDROCHLORIDE 10 MG: 5 TABLET ORAL at 01:05

## 2017-05-03 RX ADMIN — ASPIRIN 325 MG: 325 TABLET, DELAYED RELEASE ORAL at 08:05

## 2017-05-03 RX ADMIN — ROPIVACAINE HYDROCHLORIDE 8 ML/HR: 2 INJECTION, SOLUTION EPIDURAL; INFILTRATION at 05:05

## 2017-05-03 NOTE — PROGRESS NOTES
Called to bedside to assess patient disconnection from PNC infusion. Patient was adjusting herself in bed and dislodged alligator clip from catheter. No apparent damage and dressing remained occlusive. Infusion line reprimed, and catheter and alligator clip cleaned prior to reconnection. Physician bolus of 10cc administered at the bedside via pump without occlusion or leaks. Infusion restarted.

## 2017-05-03 NOTE — NURSING
Pt d/c in stable condition. Dressing intact. IV removed, tip intact. Rx and instructions given, states understanding. Answered all questions thoroughly. Instructed to call [facility] office if pt has any problems.

## 2017-05-03 NOTE — PROGRESS NOTES
CM notified by PT Jose that patient did not receive any DME to her bedside. CM ordered a rolling walker to be delivered to patient's bedside on 5/2/17 at 0907- see CM notes for details. CM reordered RW from [place] with [facility] to be delivered STAT.    Addendum on 5/3/17 at 1208: CM informed [place] with [facility] that patient will be present at her residence ([address] [facility] [place] [address]) at 1700 today to receive her BSC, SC, and 2 hip kits. DME not delivered on 5/2/17 as ordered by CM. CM informed rolling walker is to be delivered to patient's bedside asap.    Lexis Hernandez RN, CM [facility]  [phone] -[phone]- [phone]

## 2017-05-03 NOTE — DISCHARGE SUMMARY
[facility]  Discharge Summary      Admit Date: 5/1/2017    Discharge Date and Time:  05/03/2017 4:55 PM    Attending Physician: [facility] Jr    Reason for Admission: Left knee arthritis    Procedures Performed: Procedure(s) (LRB):  REPLACEMENT-KNEE-TOTAL (Left)    Hospital Course (synopsis of major diagnoses, care, treatment, and services provided during the course of the hospital stay): On 5/1/2017, the patient arrived to the [facility] for proper pre-operative management.  Upon completion of pre-operative preparation, the patient was taken back to the operative theatre.  A left TKA was performed without complication and the patient was transported to the post anesthesia care unit in stable condition.  After appropriate recovery from the anaesthetic agents used during the surgery, the patient was then transported to the hospital inpatient floor.  The interim of the hospital stay from arrival on the floor up to discharge has been uncomplicated. The patient has experienced minimal electrolyte imbalances that have been repleated accordingly.  The patient's diet has progressed to a regular diet with no nausea or vomiting.  The patient's pain has been controlled using a multimodal approach with the help of the anesthesia pain service. Currently, the patient's pain is well controlled on an oral regimen.  The patient has been voiding without difficulty ever since surgery.  The patient began participation in physical therapy on post-operative day one and has progressed throughout the entire hospital stay.  Currently the patient is ambulating with moderate assistance and the physical therapy team feels that the patient's progress is sufficient to allow the patient to be discharged home safely.  The patient agrees with this assessment and desires a discharge to home today.       Consults: PT and OT and anesthesia    Significant Diagnostic Studies: Labs: BMP: No results for input(s):  GLU, NA, K, CL, CO2, BUN, CREATININE, CALCIUM, MG in the last 48 hours. and CBC No results for input(s): WBC, HGB, HCT, PLT in the last 48 hours.  Left knee x-ray: stable prosthesis    Final Diagnoses:    Principal Problem: Primary osteoarthritis of left knee   Secondary Diagnoses:   Active Hospital Problems    Diagnosis  POA    *Primary osteoarthritis of left knee [M17.12]  Yes    Type 2 diabetes mellitus not at goal [E11.9]  Yes    Hypertension [I10]  Yes      Resolved Hospital Problems    Diagnosis Date Resolved POA   No resolved problems to display.       Discharged Condition: stable    Disposition: Home or Self Care    Follow Up/Patient Instructions:      Medications:  Reconciled Home Medications:   Discharge Medication List as of 5/3/2017  8:55 AM      START taking these medications    Details   aspirin 325 MG tablet Take 1 tablet (325 mg total) by mouth 2 (two) times daily., Starting 5/1/2017, Until Tue 5/1/18, Print      docusate sodium (COLACE) 100 MG capsule Take 1 capsule (100 mg total) by mouth 2 (two) times daily., Starting 5/1/2017, Until Discontinued, Print      ondansetron (ZOFRAN) 8 MG tablet Take 1 tablet (8 mg total) by mouth every 8 (eight) hours as needed for Nausea., Starting 5/1/2017, Until Discontinued, Print      oxycodone-acetaminophen (PERCOCET)  mg per tablet Take 1 tablet by mouth every 4 (four) hours as needed for Pain., Starting 5/1/2017, Until Discontinued, Print         CONTINUE these medications which have NOT CHANGED    Details   !! ACCU-CHEK FASTCLIX Misc Starting 12/30/2016, Until Discontinued, Historical Med      alcohol swabs (ALCOHOL PADS) PadM Apply 1 each topically as needed., Starting 12/30/2016, Until Discontinued, OTC      amlodipine (NORVASC) 10 MG tablet take 1 tablet by mouth every morning, Normal      blood sugar diagnostic (ONETOUCH VERIO) Strp 1 strip by Misc.(Non-Drug; Combo Route) route 2 (two) times daily., Starting 12/30/2016, Until Discontinued, Normal       dextroamphetamine-amphetamine 10 mg Tab TK 1 T PO IN THE MORNING BID FOR 1 MONTH., Historical Med      dulaglutide (TRULICITY) 0.75 mg/0.5 mL PnIj Inject 0.75 mg into the skin once a week., Starting 3/28/2017, Until Discontinued, Normal      hydrochlorothiazide (HYDRODIURIL) 12.5 MG Tab Take 1 tablet (12.5 mg total) by mouth once daily., Starting 4/20/2017, Until Fri 4/20/18, Normal      metformin (GLUCOPHAGE) 500 MG tablet Take 2 tablets (1,000 mg total) by mouth daily with breakfast., Starting 12/22/2016, Until Discontinued, Normal      metoprolol succinate (TOPROL-XL) 25 MG 24 hr tablet Take 1 tablet (25 mg total) by mouth every evening., Starting 1/6/2017, Until Sat 1/6/18, Normal      omeprazole (PRILOSEC) 40 MG capsule Take 1 capsule (40 mg total) by mouth once daily., Starting 12/14/2016, Until Discontinued, Normal      !! ONE TOUCH DELICA LANCETS 33 gauge Misc Starting 6/7/2013, Until Discontinued, Historical Med      cyanocobalamin (VITAMIN B-12) 1000 MCG tablet Take 1,000 mcg by mouth once daily., Starting 10/8/2016, Until Discontinued, Historical Med      DIPHENHYDRAMINE HCL (WAL-DRYL ALLERGY ORAL) Take by mouth as needed (alergies)., Until Discontinued, Historical Med      diphenoxylate-atropine 2.5-0.025 mg (LOMOTIL) 2.5-0.025 mg per tablet Take by mouth 3 (three) times daily as needed. 1 Tablet Oral Three times a day, Until Discontinued, Historical Med      naproxen sodium (ANAPROX) 220 MG tablet Take 440 mg by mouth as needed., Until Discontinued, Historical Med      potassium chloride (MICRO-K) 10 MEQ CpSR take 1 capsule by mouth twice a day, Normal      valsartan (DIOVAN) 320 MG tablet take 1 tablet by mouth once daily, Normal       !! - Potential duplicate medications found. Please discuss with provider.      STOP taking these medications       ibuprofen (ADVIL,MOTRIN) 800 MG tablet Comments:   Reason for Stopping:         ibuprofen (ADVIL,MOTRIN) 800 MG tablet Comments:   Reason for Stopping:  "        cyclobenzaprine (FLEXERIL) 10 MG tablet Comments:   Reason for Stopping:               Discharge Procedure Orders  COMMODE FOR HOME USE   Order Specific Question Answer Comments   Type: Standard    Height: 5' 2" (1.575 m)    Weight: 98 kg (216 lb)    Does patient have medical equipment at home? none    Length of need (1-99 months): 99    Vendor: [facility] to deliver, orders given to [facility]   Expected Date of Delivery: 5/2/2017      TRANSFER TUB BENCH FOR HOME USE   Order Specific Question Answer Comments   Type of Transfer Tub Bench: Unpadded    Height: 5' 2" (1.575 m)    Weight: 98 kg (216 lb)    Does patient have medical equipment at home? none    Length of need (1-99 months): 99    Vendor: [facility] to deliver, orders given to [facility]   Expected Date of Delivery: 5/2/2017      WALKER FOR HOME USE   Order Specific Question Answer Comments   Type of Walker: Adult (5'4"-6'6")    With wheels? No    Height: 5' 2" (1.575 m)    Weight: 98 kg (216 lb)    Length of need (1-99 months): 99    Does patient have medical equipment at home? none    Please check all that apply: Patient's condition impairs ambulation.    Please check all that apply: Walker will be used for gait training.    Vendor: Other (use comments) Disregard   Expected Date of Delivery: 5/2/2017      [employer] - OTHER   Order Specific Question Answer Comments   Type of Equipment: hip kit    Height: 5' 2" (1.575 m)    Weight: 98 kg (216 lb)    Does patient have medical equipment at home? none    Vendor: [facility] to deliver, orders given to [facility]    Expected Date of Delivery: 5/2/2017      WALKER FOR HOME USE   Order Specific Question Answer Comments   Type of Walker: Adult (5'4"-6'6")    With wheels? Yes    Height: 5' 2" (1.575 m)    Weight: 98 kg (216 lb)    Length of need (1-99 months): 99    Does patient have medical equipment at home? none    Please check all that apply: Patient is unable to safely ambulate without equipment.  "   Please check all that apply: Walker will be used for gait training.    Vendor: [facility] to deliver, orders given to Rebecca   Expected Date of Delivery: 5/2/2017      Diet general     Activity as tolerated     Sponge bath only until clinic visit     Shower on day dressing removed (No bath)     Keep surgical extremity elevated     Ice to affected area     Lifting restrictions   Scheduling Instructions: No strenuous exercise or lifting of >10 pounds.     Weight bearing restrictions (specify)   Scheduling Instructions: Weightbearing as tolerated     Other restrictions (specify):   Scheduling Instructions: Posterior hip precautions to operative extremity     Call MD for:  temperature >100.4     Call MD for:  persistent nausea and vomiting or diarrhea     Call MD for:  redness, tenderness, or signs of infection (pain, swelling, redness, odor or green/yellow discharge around incision site)     Call MD for:  difficulty breathing or increased cough     Call MD for:  severe persistent headache     Call MD for:  worsening rash     Call MD for:  persistent dizziness, light-headedness, or visual disturbances     Call MD for:  increased confusion or weakness     Leave dressing on - Keep it clean, dry, and intact until clinic visit       Follow-up Information     Follow up with Rachel Murdock PA-C. Go on 5/16/2017.    Specialty:  Orthopedic Surgery    Why:  For wound re-check, Follow-Up Appointment    Contact information:    [address]  [facility] [address]  [phone]

## 2017-05-03 NOTE — PROGRESS NOTES
[facility]  Orthopedics  Progress Note    Patient Name: Kaylee Romero  MRN: [id number]  Admission Date: 5/1/2017  Hospital Length of Stay: 2 days  Attending Provider: John L. [facility] Jr., MD  Primary Care Provider: Lexy Nicole MD  Follow-up For: Procedure(s) (LRB):  REPLACEMENT-KNEE-TOTAL (Left)    Post-Operative Day: 2 Days Post-Op  Subjective:     Principal Problem:Primary osteoarthritis of left knee    Principal Orthopedic Problem: left knee arthritis    Interval History: No acute events overnight. PNC disconnected last night and was reconnected by anesthesia. Walked 95 feet with PT yesterday. Some HTN noted overnight. Denies CP, SOB, n, v. Passing gas.     Review of patient's allergies indicates:   Allergen Reactions    Keflex [cephalexin] Other (See Comments)     Turns orange    Bactrim [sulfamethoxazole-trimethoprim]      Generic version  Sulfa makes her sick       Current Facility-Administered Medications   Medication    acetaminophen tablet 1,000 mg    amlodipine tablet 10 mg    aspirin EC tablet 325 mg    bisacodyl suppository 10 mg    cyanocobalamin tablet 1,000 mcg    dextrose 50% injection 12.5 g    dextrose 50% injection 25 g    famotidine tablet 20 mg    glucagon (human recombinant) injection 1 mg    glucose chewable tablet 16 g    glucose chewable tablet 24 g    hydrochlorothiazide tablet 12.5 mg    insulin aspart pen 1-10 Units    metoprolol succinate (TOPROL-XL) 24 hr tablet 25 mg    morphine injection 2 mg    morphine injection 2 mg    naloxone 0.4 mg/mL injection 0.02 mg    ondansetron injection 4 mg    oxycodone immediate release tablet 10 mg    oxycodone immediate release tablet 5 mg    pantoprazole EC tablet 40 mg    polyethylene glycol packet 17 g    potassium chloride CR capsule 10 mEq    pregabalin capsule 75 mg    ramelteon tablet 8 mg    ropivacaine (PF) 2 mg/ml (0.2%) infusion    senna-docusate 8.6-50 mg per tablet 1 tablet     "sodium chloride 0.9% flush 3 mL    valsartan tablet 320 mg     Objective:     Vital Signs (Most Recent):  Temp: 98.3 °F (36.8 °C) (05/03/17 0000)  Pulse: 71 (05/03/17 0000)  Resp: 16 (05/03/17 0000)  BP: (!) 177/94 (05/03/17 0000)  SpO2: 96 % (05/03/17 0000) Vital Signs (24h Range):  Temp:  [96.6 °F (35.9 °C)-98.3 °F (36.8 °C)] 98.3 °F (36.8 °C)  Pulse:  [71-94] 71  Resp:  [16] 16  SpO2:  [95 %-100 %] 96 %  BP: (142-177)/(82-94) 177/94     Weight: 98 kg (216 lb)  Height: 5' 2" (157.5 cm)  Body mass index is 39.51 kg/(m^2).      Intake/Output Summary (Last 24 hours) at 05/03/17 0531  Last data filed at 05/02/17 1600   Gross per 24 hour   Intake              368 ml   Output              500 ml   Net             -132 ml       General    Constitutional: She is oriented to person, place, and time.   Neurological: She is alert and oriented to person, place, and time.   Psychiatric: She has a normal mood and affect.             Left Knee Exam     Comments:  Left knee dressing c,d,i, SILT distally, EHL, FHL, GC, TA intact, 2+ DP.       Significant Labs: All pertinent labs within the past 24 hours have been reviewed.    Significant Imaging: None    Assessment/Plan:     * Primary osteoarthritis of left knee  POD2 L TKA  - WBAT LLE  - PT/OT  - Pain control per anesthesia  - Discharge home today after PT    Hypertension  Continue home meds    Type 2 diabetes mellitus not at goal  Sliding scale insulin        Carlin Rose MD  Orthopedics  [facility]  "

## 2017-05-03 NOTE — SUBJECTIVE & OBJECTIVE
Principal Problem:Primary osteoarthritis of left knee    Principal Orthopedic Problem: left knee arthritis    Interval History: No acute events overnight. PNC disconnected last night and was reconnected by anesthesia. Walked 95 feet with PT yesterday. Some HTN noted overnight. Denies CP, SOB, n, v. Passing gas.     Review of patient's allergies indicates:   Allergen Reactions    Keflex [cephalexin] Other (See Comments)     Turns orange    Bactrim [sulfamethoxazole-trimethoprim]      Generic version  Sulfa makes her sick       Current Facility-Administered Medications   Medication    acetaminophen tablet 1,000 mg    amlodipine tablet 10 mg    aspirin EC tablet 325 mg    bisacodyl suppository 10 mg    cyanocobalamin tablet 1,000 mcg    dextrose 50% injection 12.5 g    dextrose 50% injection 25 g    famotidine tablet 20 mg    glucagon (human recombinant) injection 1 mg    glucose chewable tablet 16 g    glucose chewable tablet 24 g    hydrochlorothiazide tablet 12.5 mg    insulin aspart pen 1-10 Units    metoprolol succinate (TOPROL-XL) 24 hr tablet 25 mg    morphine injection 2 mg    morphine injection 2 mg    naloxone 0.4 mg/mL injection 0.02 mg    ondansetron injection 4 mg    oxycodone immediate release tablet 10 mg    oxycodone immediate release tablet 5 mg    pantoprazole EC tablet 40 mg    polyethylene glycol packet 17 g    potassium chloride CR capsule 10 mEq    pregabalin capsule 75 mg    ramelteon tablet 8 mg    ropivacaine (PF) 2 mg/ml (0.2%) infusion    senna-docusate 8.6-50 mg per tablet 1 tablet    sodium chloride 0.9% flush 3 mL    valsartan tablet 320 mg     Objective:     Vital Signs (Most Recent):  Temp: 98.3 °F (36.8 °C) (05/03/17 0000)  Pulse: 71 (05/03/17 0000)  Resp: 16 (05/03/17 0000)  BP: (!) 177/94 (05/03/17 0000)  SpO2: 96 % (05/03/17 0000) Vital Signs (24h Range):  Temp:  [96.6 °F (35.9 °C)-98.3 °F (36.8 °C)] 98.3 °F (36.8 °C)  Pulse:  [71-94] 71  Resp:  [16]  "16  SpO2:  [95 %-100 %] 96 %  BP: (142-177)/(82-94) 177/94     Weight: 98 kg (216 lb)  Height: 5' 2" (157.5 cm)  Body mass index is 39.51 kg/(m^2).      Intake/Output Summary (Last 24 hours) at 05/03/17 0531  Last data filed at 05/02/17 1600   Gross per 24 hour   Intake              368 ml   Output              500 ml   Net             -132 ml       General    Constitutional: She is oriented to person, place, and time.   Neurological: She is alert and oriented to person, place, and time.   Psychiatric: She has a normal mood and affect.             Left Knee Exam     Comments:  Left knee dressing c,d,i, SILT distally, EHL, FHL, GC, TA intact, 2+ DP.       Significant Labs: All pertinent labs within the past 24 hours have been reviewed.    Significant Imaging: None  "

## 2017-05-03 NOTE — ADDENDUM NOTE
Addendum  created 05/03/17 1437 by Patito Danielle RN    Anesthesia Event edited, Procedure Event Log accessed

## 2017-05-03 NOTE — NURSING
Pt reports pain 10/10 after working with PT, PRN medication not due until 1130. Notified Dr. Espinal, per MD altman to give oxycodone 10mg now.

## 2017-05-03 NOTE — ANESTHESIA POST-OP PAIN MANAGEMENT
Acute Pain Service and Perioperative Surgical Home Progress Note    HPI  Kaylee Romero is a 72 y.o., female,     Interval history      Surgery:  Procedure(s) (LRB):  REPLACEMENT-KNEE-TOTAL (Left)    Post Op Day #: 2    Catheter type: Perineural Adductor Canal    Infusion type: Ropivacaine 0.2%  8 ml/hr basal    Problem List:    Active Hospital Problems    Diagnosis  POA    *Primary osteoarthritis of left knee [M17.12]  Yes    Type 2 diabetes mellitus not at goal [E11.9]  Yes    Hypertension [I10]  Yes      Resolved Hospital Problems    Diagnosis Date Resolved POA   No resolved problems to display.       Subjective:       General appearance of alert, oriented, no complaints   Pain with rest: 5    Numbers   Pain with movement: 8    Numbers   Side Effects    1. Pruritis No    2. Nausea No    3. Motor Blockade No, 0=Ability to raise lower extremities off bed    4. Sedation No, 1=awake and alert    Schedule Medications:    acetaminophen  1,000 mg Oral Q6H    amlodipine  10 mg Oral QAM    aspirin  325 mg Oral BID    cyanocobalamin  1,000 mcg Oral Daily    famotidine  20 mg Oral BID    hydrochlorothiazide  12.5 mg Oral Daily    metoprolol succinate  25 mg Oral Daily    pantoprazole  40 mg Oral Daily    polyethylene glycol  17 g Oral Daily    potassium chloride  10 mEq Oral BID    pregabalin  75 mg Oral QHS    senna-docusate 8.6-50 mg  1 tablet Oral BID    valsartan  320 mg Oral Daily        Continuous Infusions:   ropivacaine (PF) 2 mg/ml (0.2%) 8 mL/hr (05/03/17 0551)        PRN Medications:  bisacodyl, dextrose 50%, dextrose 50%, glucagon (human recombinant), glucose, glucose, insulin aspart, morphine, morphine, naloxone, ondansetron, oxycodone, oxycodone, ramelteon, sodium chloride 0.9%       Antibiotics:  Antibiotics     None             Objective:     Catheter site clean, dry, intact          Vital Signs (Most Recent):  Temp: 36.3 °C (97.4 °F) (05/03/17 0800)  Pulse: 89 (05/03/17 0800)  Resp: 14  (05/03/17 0800)  BP: (!) 142/86 (05/03/17 0800)  SpO2: 97 % (05/03/17 0800) Vital Signs Range (Last 24H):  Temp:  [35.9 °C (96.6 °F)-36.8 °C (98.3 °F)]   Pulse:  [63-94]   Resp:  [14-16]   BP: (142-181)/()   SpO2:  [95 %-100 %]          I & O (Last 24H):  Intake/Output Summary (Last 24 hours) at 05/03/17 1008  Last data filed at 05/03/17 0900   Gross per 24 hour   Intake              598 ml   Output              300 ml   Net              298 ml       Physical Exam:    GA: Alert, comfortable, no acute distress.   Pulmonary: Clear to auscultation A/P/L. No wheezing, crackles, or rhonchi.  Cardiac: RRR S1 & S2 w/o rubs/murmurs/gallops.   Abdominal:Bowel sounds present. No tenderness to palpation or distension. No appreciable hepatosplenomegaly.   Skin: No jaundice, rashes, or visible lesions.         Laboratory:  CBC: No results for input(s): WBC, RBC, HGB, HCT, PLT, MCV, MCH, MCHC in the last 72 hours.    BMP: Recent Labs      05/01/17   0911   NA  137   K  4.3   CO2  25   CL  105   BUN  18   CREATININE  0.9   GLU  197*   CALCIUM  8.7       No results for input(s): INR, PROTIME, APTT in the last 72 hours.    Invalid input(s): PT      Anticoagulant dose     Assessment:    Pain control adequate    Plan:     1) Pain:    Adductor canal perineural catheter stopped this AM. Continue multimodal pain regimen with acetaminophen, Celebrex, Lyrica, and prn oxycodone given.  Will continue to monitor.    2) KAMRAN: on home CPAP at night with adjusted PRN   3)DM: blood sugar controlled, on oral control at home,continue moderate SSI   4) HTN: on home medications, controlled   5) GERD: well controlled on home meds   6) FEN/GI: Tolerating full diet.  + flatus. + BM.   7) Dispo: Pt working well with PT/OT. Case management and SW for    placement. Plan for D/C this afternoon.    Evaluator Robbie Espinal MD                    I have seen the patient, reviewed the Resident's assessment and plan. I have personally interviewed and  examined the patient at bedside and: agree with the findings.

## 2017-05-03 NOTE — PLAN OF CARE
Problem: Occupational Therapy Goal  Goal: Occupational Therapy Goal  Goals to be met by: 05/08    Patient will increase functional independence with ADLs by performing:    UE Dressing with Supervision.  LE Dressing with Supervision with AD as needed.  Grooming while standing with Supervision.  Toileting from bedside commode with Supervision for hygiene and clothing management.   Stand pivot transfers with Supervision.  Toilet transfer to bedside commode with Supervision.     Cont. POC

## 2017-05-03 NOTE — PLAN OF CARE
Problem: Patient Care Overview  Goal: Plan of Care Review  Outcome: Outcome(s) achieved Date Met:  05/03/17  Pt resting in chair, up with assist. Tolerating diet. Awaiting PT for d/c. Patient states adequate pain control with current pain med reg. States understanding of plan of care. Denies needs at present. Safety and fall precautions maintained. Will monitor.

## 2017-05-03 NOTE — ADDENDUM NOTE
Addendum  created 05/03/17 1411 by Kary Boss MD    Charge Capture section accepted, Sign clinical note

## 2017-05-03 NOTE — PT/OT/SLP PROGRESS
Occupational Therapy  Treatment    Kaylee Romero   MRN: [id number]   Admitting Diagnosis: Primary osteoarthritis of left knee    OT Date of Treatment: 05/03/17   OT Start Time: 0857  OT Stop Time: 0935  OT Total Time (min): 38 min    Billable Minutes:  Self Care/Home Management 38    General Precautions: Standard, fall  Orthopedic Precautions: LLE weight bearing as tolerated  Braces:           Subjective:  Communicated with RN prior to session.      Pain Rating: 10/10              Pain Rating Post-Intervention: 10/10    Objective:  Patient found with: Polar ice, FCD     Functional Mobility:  Bed Mobility:       Transfers:   Sit <> Stand Assistance: Stand By Assistance  Sit <> Stand Assistive Device: Rolling Walker  Toilet Transfer Technique: Stand Pivot  Toilet Transfer Assistance: Stand By Assistance  Toilet Transfer Assistive Device: Rolling Walker    Functional Ambulation: Pt was able to walk to bathroom c CGA and RW.    Activities of Daily Living:       LE Dressing Level of Assistance: Modified independent (To don/doff socks c reacher and sock-aid.)    Grooming Position: Standing at sink (To wash hands.)  Grooming Level of Assistance: Modified independent  Toileting Where Assessed: Toilet  Toileting Level of Assistance: Modified independent            Balance:   Static Sit: NORMAL: No deviations seen in posture held statically  Dynamic Sit: NORMAL: No deviations seen in posture held dynamically  Static Stand: GOOD: Takes MODERATE challenges from all directions  Dynamic stand: GOOD: Needs SUPERVISION only during gait and able to self right with moderate         AM-PAC 6 CLICK ADL   How much help from another person does this patient currently need?   1 = Unable, Total/Dependent Assistance  2 = A lot, Maximum/Moderate Assistance  3 = A little, Minimum/Contact Guard/Supervision  4 = None, Modified [place]/Independent    Putting on and taking off regular lower body clothing? : 3  Bathing (including washing,  rinsing, drying)?: 3  Toileting, which includes using toilet, bedpan, or urinal? : 4  Putting on and taking off regular upper body clothing?: 4  Taking care of personal grooming such as brushing teeth?: 4  Eating meals?: 4  Total Score: 22     AM-PAC Raw Score CMS G-Code Modifier Level of Impairment Assistance   6 % Total / Unable   7 - 9 CM 80 - 100% Maximal Assist   10 - 14 CL 60 - 80% Moderate Assist   15 - 19 CK 40 - 60% Moderate Assist   20 - 22 CJ 20 - 40% Minimal Assist   23 CI 1-20% SBA / CGA   24 CH 0% Independent/ Mod I     Patient left up in chair with all lines intact, call button in reach, RN notified and family present    ASSESSMENT:  Kaylee Romero is a 72 y.o. female with a medical diagnosis of Primary osteoarthritis of left knee and presents with deficits in ADL's and functional T/F's.  Pt was able to perform sit/stand and toilet T/F c CGA and RW.  Able to perform LB dressing c mod I and AE.  Educated pt on proper footwear, bathing techniques and car T/F's via discussion.    Rehab identified problem list/impairments: Rehab identified problem list/impairments: impaired self care skills, impaired functional mobilty, orthopedic precautions    Rehab potential is good.    Activity tolerance: Good    Discharge recommendations: Discharge Facility/Level Of Care Needs: home health OT     Barriers to discharge: Barriers to Discharge: None    Equipment recommendations: bedside commode, bath bench, hip kit, walker, rolling     GOALS:   Occupational Therapy Goals        Problem: Occupational Therapy Goal    Goal Priority Disciplines Outcome Interventions   Occupational Therapy Goal     OT, PT/OT Ongoing (interventions implemented as appropriate)    Description:  Goals to be met by: 05/08    Patient will increase functional independence with ADLs by performing:    UE Dressing with Supervision.  LE Dressing with Supervision with AD as needed.  Grooming while standing with Supervision.  Toileting from  bedside commode with Supervision for hygiene and clothing management.   Stand pivot transfers with Supervision.  Toilet transfer to bedside commode with Supervision.                   Plan:  Patient to be seen daily to address the above listed problems via self-care/home management, therapeutic activities, therapeutic exercises  Plan of Care expires:    Plan of Care reviewed with: patient, spouse         INGRID Álvarez  05/03/2017

## 2017-05-03 NOTE — PLAN OF CARE
Problem: Physical Therapy Goal  Goal: Physical Therapy Goal  Goals to be met by: 17     Patient will increase functional independence with mobility by performin. Supine to sit with Set-up [place]  2. Sit to supine with Set-up [place]  3. Sit to stand transfer with Stand-by Assistance  4. Gait x 150 feet with Stand-by Assistance using Rolling Walker.   5. Ascend/descend 3 stair with right Handrails Contact Guard Assistance  6. Lower extremity exercise program x 30 reps per handout, with independence   Patient goals remain appropriate.  Patient will continue to benefit from further PT services.  Jose Oseguera, PTA

## 2017-05-03 NOTE — PT/OT/SLP PROGRESS
Physical Therapy  Treatment    Kaylee Romero   MRN: [id number]   Admitting Diagnosis: Primary osteoarthritis of left knee    PT Received On: 05/03/17  PT Start Time: 1035     PT Stop Time: 1136    PT Total Time (min): 61 min       Billable Minutes:  Gait [id number], Therapeutic Activity 25 and Therapeutic Exercise 11    Treatment Type: Treatment  PT/PTA: PTA     PTA Visit Number: 3       General Precautions: Standard, fall  Orthopedic Precautions: LLE weight bearing as tolerated   Braces:           Subjective:  Communicated with NSG prior to session.  Patient complained of increase pain but agreeable to therapy.  Patient's mood and energy level fluctuated during treatment session from being lethargic to dancing in the chair when she heard music.  Patient stated you've never had anybody like me before.      Pain Rating: 10/10 (with mobility)  Location - Side: Left     Location: knee          Objective:   Patient found with: BIPAP, FCD, Polar ice    Functional Mobility:  Bed Mobility:   Supine to Sit: Minimum Assistance  Sit to Supine: Minimum Assistance    Transfers:  Sit <> Stand Assistance: Contact Guard Assistance  Sit <> Stand Assistive Device: Rolling Walker    Gait:   Gait Distance: 50 feet, 8 feet, and 20 feet  Assistance 1: Contact Guard Assistance  Gait Assistive Device: Rolling walker  Gait Pattern: 3-point gait  Gait Deviation(s): decreased chantell, increased time in double stance, decreased velocity of limb motion, decreased step length, decreased stride length, forward lean (no LOB, occasional VC for walker mgmnt and posture)    Stairs:  Ascend and descend 4 steps with L HR B UE support CGA.  VC for technique and sequence    Balance:   Static Sit: NORMAL: No deviations seen in posture held statically  Dynamic Sit: NORMAL: No deviations seen in posture held dynamically  Static Stand: FAIR+: Takes MINIMAL challenges from all directions  Dynamic stand: FAIR: Needs CONTACT GUARD during gait     Therapeutic  Activities and Exercises:  Patient performed TKR protocol exercises x10 reps.  Limited secondary to pain.  Patient given another copy of HEP  Patient and patient's  educated on car transfers  Patient's  educated on gait, transfers, bed mobility, HEP, stair climbing, and safety with OOB mobility  Patient and patient's  stated they felt comfortable going home and had no concerns.       AM-PAC 6 CLICK MOBILITY  How much help from another person does this patient currently need?   1 = Unable, Total/Dependent Assistance  2 = A lot, Maximum/Moderate Assistance  3 = A little, Minimum/Contact Guard/Supervision  4 = None, Modified [place]/Independent    Turning over in bed (including adjusting bedclothes, sheets and blankets)?: 3  Sitting down on and standing up from a chair with arms (e.g., wheelchair, bedside commode, etc.): 3  Moving from lying on back to sitting on the side of the bed?: 3  Moving to and from a bed to a chair (including a wheelchair)?: 3  Need to walk in hospital room?: 3  Climbing 3-5 steps with a railing?: 3  Total Score: 18    AM-PAC Raw Score CMS G-Code Modifier Level of Impairment Assistance   6 % Total / Unable   7 - 9 CM 80 - 100% Maximal Assist   10 - 14 CL 60 - 80% Moderate Assist   15 - 19 CK 40 - 60% Moderate Assist   20 - 22 CJ 20 - 40% Minimal Assist   23 CI 1-20% SBA / CGA   24 CH 0% Independent/ Mod I     Patient left up in chair with all lines intact and call button in reach.    Assessment:  Kaylee Romero is a 72 y.o. female with a medical diagnosis of Primary osteoarthritis of left knee and presents with decrease strength, mobility, pain and ROM.  Patient limited with OOB mobility secondary to pain. Patient demonstrated good L knee stability with gait and stair climbing.  Patient required less assistance with transfers.  Patient still requires increase assistance with bed mobility.  Patient's  able to assist her with all mobility and  transfers. Patient will require family assistance upon discharge from hospital.      Rehab identified problem list/impairments: Rehab identified problem list/impairments: weakness, impaired functional mobilty, gait instability, impaired balance, impaired self care skills, decreased lower extremity function, decreased ROM, pain    Rehab potential is good.    Activity tolerance: Fair    Discharge recommendations: Discharge Facility/Level Of Care Needs: home health PT     Barriers to discharge: Barriers to Discharge: None    Equipment recommendations: Equipment Needed After Discharge: bedside commode, bath bench, hip kit, walker, rolling     GOALS:   Physical Therapy Goals        Problem: Physical Therapy Goal    Goal Priority Disciplines Outcome Goal Variances Interventions   Physical Therapy Goal     PT/OT, PT Ongoing (interventions implemented as appropriate)     Description:  Goals to be met by: 17     Patient will increase functional independence with mobility by performin. Supine to sit with Set-up [place]  2. Sit to supine with Set-up [place]  3. Sit to stand transfer with Stand-by Assistance  4. Gait  x 150 feet with Stand-by Assistance using Rolling Walker.   5. Ascend/descend 3 stair with right Handrails Contact Guard Assistance  6. Lower extremity exercise program x 30 reps per handout, with independence                PLAN:    Patient to be seen BID  to address the above listed problems via gait training, therapeutic activities, therapeutic exercises, neuromuscular re-education  Plan of Care expires:    Plan of Care reviewed with: patient, spouse         Jose Oseguera II, PTA  2017

## 2017-05-05 ENCOUNTER — PATIENT OUTREACH (OUTPATIENT)
Dept: ADMINISTRATIVE | Facility: CLINIC | Age: 72
End: 2017-05-05
Payer: COMMERCIAL

## 2017-05-05 NOTE — PATIENT INSTRUCTIONS
Discharge Instructions for Total Knee Replacement  You have undergone knee replacement surgery. The knee joint forms where the thighbone, shinbone, and kneecap meet. The knee joint is supported by muscles and ligaments, and is lined with a cushioning called cartilage. Over time, cartilage wears away. This can make the knee feel stiff and painful. Your doctor replaced your painful joint with an artificial joint to relieve pain and restore movement. Here are some instructions to follow once at home.  Home care  · When your doctor says it's OK to shower, carefully wash your incision with soap and water. Rinse the incision well. Then gently pat it dry. Dont rub the incision, or apply creams or lotions. Sit on a shower stool or chair when you shower to keep from falling.  · Take pain medicine as directed by your doctor.  Sitting and sleeping  · Sit in chairs with arms. The arms make it easier for you to stand up or sit down.  · Dont sit for more than 30 to 45 minutes at one time.  · Nap if you are tired, but dont stay in bed all day.  · Sleep with a pillow under your ankle, not your knee. Be sure to change the position of your leg during the night.  Moving safely  · The key to successful recovery is movement with walking and exercising your knee as directed by your doctor. You should be able to start moving your leg shortly after surgery as directed by your doctor.    · Walk up and down stairs with support. Try one step at a time. Use the railing if possible.  · Dont drive until your doctor says its OK. Most people can start driving about 6 weeks after surgery. Dont drive while you are taking opioid pain medication.  Other precautions  · Avoid soaking your knee in water (no hot tubs, bathtubs, swimming pools) until your doctor says its OK.  · Wear the support stockings you were given in the hospital, as instructed by your doctor. You may wear these stockings for 4 to 6 weeks after surgery. If needed, you can  place a bandage over the incision to prevent irritation from clothing or support stockings.  · Arrange your household to keep the items you need handy. Keep everything else out of the way. Remove items that may cause you to fall, such as throw rugs and electrical cords.  · Use nonslip bath mats, grab bars, an elevated toilet seat, and a shower chair in your bathroom.  · Until your balance, flexibility, and strength improve, use a cane, crutches, a walker, handrails, or someone to help you.  · Keep your hands free by using a backpack, diogenes pack, apron, or pockets to carry things.  · Prevent infection. Ask your doctor for instructions if you havent already received them. Any infection will need to be treated immediately. Call your doctor right away if you think you might have an infection.  · Tell your dentist that you have an artificial joint and take antibiotics as prescribed before any dental work.  · Tell all your healthcare providers about your artificial joint before any medical procedure.  · Maintain a healthy weight. Get help to lose any extra pounds. Added body weight puts stress on the knee.  · Take any medicine you may have been given after surgery. This may include blood-thinning medicine to prevent blood clots or antibiotics to prevent infection.  Follow-up care  Follow up with your healthcare provider, or as advised. You will need to have your staples removed 2 to 3 weeks after surgery.     When to call your healthcare provider  Call 911 right away if you have:  · Chest pain  · Shortness of breath  · Any pain or tenderness in your calf  Otherwise, call your healthcare provider right away if you have:  · Fever of 100.4 °F (38 °C) or higher, or as advised  · Shaking chills  · Stiffness, or inability to move the knee  · Increased swelling in your leg  · Increased redness, tenderness, or swelling in or around the knee incision  · Drainage from the knee incision  · Increased knee pain   Date Last Reviewed:  7/1/2016  © 4007-9323 [employer] [address] [place] [address], [place] [address]. All rights reserved. This information is not intended as a substitute for professional medical care. Always follow your healthcare professional's instructions.

## 2017-05-08 ENCOUNTER — TELEPHONE (OUTPATIENT)
Dept: ORTHOPEDICS | Facility: CLINIC | Age: 72
End: 2017-05-08

## 2017-05-08 DIAGNOSIS — G89.29 CHRONIC PAIN OF LEFT KNEE: Primary | ICD-10-CM

## 2017-05-08 DIAGNOSIS — M25.562 CHRONIC PAIN OF LEFT KNEE: Primary | ICD-10-CM

## 2017-05-08 NOTE — TELEPHONE ENCOUNTER
I spoke to pt and she was informed the message was sent to Rachel and we would call her back once we got an answer. Pt understood.

## 2017-05-08 NOTE — TELEPHONE ENCOUNTER
----- Message from Soo Mata MA sent at 5/8/2017  2:28 PM CDT -----  Contact: self@home   Pt would like to know if she could take advil or aleve when she is not in so much pain? She would also like to know if she should still be taking aspirin 325 mg twice a day? Please advise.   ----- Message -----     From: Sandra Garcia     Sent: 5/8/2017   1:50 PM       To: Collette Ramos Staff    Patient states she just had knee surgery last week and wants to know can she take any else beside hydrocodone pleas call patient.

## 2017-05-08 NOTE — TELEPHONE ENCOUNTER
----- Message from Sandra Garcia sent at 5/8/2017  1:50 PM CDT -----  Contact: self@home  Patient states she just had knee surgery last week and wants to know can she take any else beside hydrocodone pleas call patient.

## 2017-05-08 NOTE — TELEPHONE ENCOUNTER
Spoke to patient. She does not have to take Percocet. She should continue asa 325 mg bid x one month. She may take Tylenol as directed on package. She may take Aleve once- twice daily with food. She will stop if she develops stomach pain.

## 2017-05-16 ENCOUNTER — OFFICE VISIT (OUTPATIENT)
Dept: ORTHOPEDICS | Facility: CLINIC | Age: 72
End: 2017-05-16
Payer: COMMERCIAL

## 2017-05-16 ENCOUNTER — OFFICE VISIT (OUTPATIENT)
Dept: OPTOMETRY | Facility: CLINIC | Age: 72
End: 2017-05-16
Payer: COMMERCIAL

## 2017-05-16 VITALS — WEIGHT: 212 LBS | HEIGHT: 62 IN | BODY MASS INDEX: 39.01 KG/M2

## 2017-05-16 DIAGNOSIS — H25.13 NUCLEAR SCLEROSIS, BILATERAL: ICD-10-CM

## 2017-05-16 DIAGNOSIS — Z96.652 STATUS POST TOTAL LEFT KNEE REPLACEMENT: Primary | ICD-10-CM

## 2017-05-16 DIAGNOSIS — H00.011 HORDEOLUM EXTERNUM OF RIGHT UPPER EYELID: Primary | ICD-10-CM

## 2017-05-16 PROCEDURE — 99999 PR PBB SHADOW E&M-EST. PATIENT-LVL V: CPT | Mod: PBBFAC,,, | Performed by: PHYSICIAN ASSISTANT

## 2017-05-16 PROCEDURE — 99024 POSTOP FOLLOW-UP VISIT: CPT | Mod: S$GLB,,, | Performed by: PHYSICIAN ASSISTANT

## 2017-05-16 PROCEDURE — 92012 INTRM OPH EXAM EST PATIENT: CPT | Mod: S$GLB,,, | Performed by: OPTOMETRIST

## 2017-05-16 PROCEDURE — 99999 PR PBB SHADOW E&M-EST. PATIENT-LVL III: CPT | Mod: PBBFAC,,, | Performed by: OPTOMETRIST

## 2017-05-16 RX ORDER — TOBRAMYCIN 3 MG/ML
1 SOLUTION/ DROPS OPHTHALMIC 3 TIMES DAILY
Qty: 5 ML | Refills: 0 | Status: SHIPPED | OUTPATIENT
Start: 2017-05-16 | End: 2017-05-23

## 2017-05-16 NOTE — PROGRESS NOTES
Kaylee Romero presents for initial post-operative visit following a left total knee arthroplasty performed by Dr. [facility] on 5/1/2017. Off of pain medication.    Exam:   There were no vitals taken for this visit.   Ambulating well with assistive device.  Incision is clean and dry without drainage or erythema. ROM: flexion to 80 degrees. Pt cannot perform SLR    Initial post-operative radiographs reviewed today revealing a well fixed and aligned prosthesis.    A/P:  2 weeks s/p left total knee replacement  Dr. [facility] interviewed and examined patient today and agrees with plan.   - The patient was advised to keep the incision clean and dry for the next 24 hours after which she may wash the area with antibacterial soap in the shower. Will not submerge until the incision is completely healed.   - Outpatient PT: Outpatient orders written and given to patient  - Continue ASA for 1 month from surgery.  - Follow up in 4 weeks with Dr. [facility]. Pt will call clinic with problems/concerns.

## 2017-05-16 NOTE — PROGRESS NOTES
HPI     Eye Problem    Additional comments: s/P incision and curretage of chalazion of AGUSTIN   several months ago.  Now noting pain in the RUL for last few days            Comments   Patient is in stating she has a stye on her ULL. ( duration 4 days).        Last edited by Christopher Pelayo, OD on 5/16/2017 12:29 PM. (History)            Assessment /Plan     For exam results, see Encounter Report.    1. Hordeolum externum of right upper eyelid  tobramycin sulfate 0.3% (TOBREX) 0.3 % ophthalmic solution   2. Nuclear sclerosis, bilateral                  Stye/exteranal nasal RUL  Expressed contents with patient's permission.  Tobramycin drops in OD tid x 7 day for prophylaxis, to keep eyeball from being infected secondarily.  Regular and diligent appliacation of warm compresses to RUL  Defer oral antibiotic.  Call/return in a few days if any apparent worsening of signs/symptoms.   Allergic to keflex and generic for bactrim.

## 2017-05-16 NOTE — MR AVS SNAPSHOT
[facility]  [address]  [facility] [address]  Phone: [phone]  Fax: [phone]                  Kaylee Romero   2017 11:30 AM   Office Visit    Description:  Female : 1945   Provider:  Christopher Pelayo OD   Department:  [facility]           Reason for Visit     Eye Problem           Diagnoses this Visit        Comments    Hordeolum externum of right upper eyelid    -  Primary            To Do List           Future Appointments        Provider Department Dept Phone    2017 9:00 AM [facility] [phone]    2017 1:00 PM [facility] [place] [facility] [phone]    2017 1:15 PM SPECIMEN, [place] [facility] [phone]    2017 1:00 PM PAYAL Gonzalez,ANP-C [facility] - Endocrinology [phone]      Goals (5 Years of Data)     None       These Medications        Disp Refills Start End    tobramycin sulfate 0.3% (TOBREX) 0.3 % ophthalmic solution 5 mL 0 2017    Place 1 drop into both eyes 3 (three) times daily. - Both Eyes    Pharmacy: [facility] [address] [place] [address] AT [facility] Ph #: [phone]         [facility]     [facility] Nurse Care Line -  Assistance  Unless otherwise directed by your provider, please contact [facility], our nurse care line that is available for  assistance.     Registered nurses in the [facility] provide: appointment scheduling, clinical advisement, health education, and other advisory services.  Call: [phone] (toll free)               Medications           Message regarding Medications     Verify the changes and/or additions to your medication regime listed below are the same as discussed with your clinician today.  If any of these changes or additions are incorrect, please notify your healthcare provider.         START taking these NEW medications        Refills    tobramycin sulfate 0.3% (TOBREX) 0.3 % ophthalmic solution 0    Sig: Place 1 drop into both eyes 3 (three) times daily.    Class: Normal    Route: Both Eyes           Verify that the below list of medications is an accurate representation of the medications you are currently taking.  If none reported, the list may be blank. If incorrect, please contact your healthcare provider. Carry this list with you in case of emergency.           Current Medications     ACCU-CHEK FASTCLIX [facility]     alcohol swabs (ALCOHOL PADS) PadM Apply 1 each topically as needed.    amlodipine (NORVASC) 10 MG tablet take 1 tablet by mouth every morning    aspirin 325 MG tablet Take 1 tablet (325 mg total) by mouth 2 (two) times daily.    blood sugar diagnostic (ONETOUCH VERIO) Strp 1 strip by Misc.(Non-Drug; Combo Route) route 2 (two) times daily.    cyanocobalamin (VITAMIN B-12) 1000 MCG tablet Take 1,000 mcg by mouth once daily.    dextroamphetamine-amphetamine 10 mg Tab TK 1 T PO IN THE MORNING BID FOR 1 MONTH.    DIPHENHYDRAMINE HCL (WAL-DRYL ALLERGY ORAL) Take by mouth as needed (alergies).    diphenoxylate-atropine 2.5-0.025 mg (LOMOTIL) 2.5-0.025 mg per tablet Take by mouth 3 (three) times daily as needed. 1 Tablet Oral Three times a day    docusate sodium (COLACE) 100 MG capsule Take 1 capsule (100 mg total) by mouth 2 (two) times daily.    dulaglutide (TRULICITY) 0.75 mg/0.5 mL PnIj Inject 0.75 mg into the skin once a week.    hydrochlorothiazide (HYDRODIURIL) 12.5 MG Tab Take 1 tablet (12.5 mg total) by mouth once daily.    metformin (GLUCOPHAGE) 500 MG tablet Take 2 tablets (1,000 mg total) by mouth daily with breakfast.    metoprolol succinate (TOPROL-XL) 25 MG 24 hr tablet Take 1 tablet (25 mg total) by mouth every evening.    naproxen sodium (ANAPROX) 220 MG tablet Take 440 mg by mouth as needed.    omeprazole (PRILOSEC) 40 MG capsule Take 1 capsule (40 mg total) by mouth once  daily.    ondansetron (ZOFRAN) 8 MG tablet Take 1 tablet (8 mg total) by mouth every 8 (eight) hours as needed for Nausea.    ONE TOUCH DELICA LANCETS 33 gauge Misc     oxycodone-acetaminophen (PERCOCET)  mg per tablet Take 1 tablet by mouth every 4 (four) hours as needed for Pain.    potassium chloride (MICRO-K) 10 MEQ CpSR take 1 capsule by mouth twice a day    tobramycin sulfate 0.3% (TOBREX) 0.3 % ophthalmic solution Place 1 drop into both eyes 3 (three) times daily.    valsartan (DIOVAN) 320 MG tablet take 1 tablet by mouth once daily           Clinical Reference Information           Allergies as of 5/16/2017     Keflex [Cephalexin]    Bactrim [Sulfamethoxazole-trimethoprim]      Immunizations Administered on Date of Encounter - 5/16/2017     None      Instructions    Stye/exteranal nasal RUL  Expressed contents with patient's permission.  Tobramycin drops in OD tid x 7 day for prophylaxis, to keep eyeball from being infected secondarily.  Regular and diligent appliacation of warm compresses to RUL  Defer oral antibiotic.  Call/return in a few days if any apparent worsening of signs/symptoms.   Allergic to keflex and generic for bactrim.        Language Assistance Services     ATTENTION: Language assistance services are available, free of charge. Please call [phone].      ATENCIÓN: Si habla chelsea, tiene a lopez disposición servicios gratuitos de asistencia lingüística. Llame al [phone].     ERNESTINE Ý: N?u b?n nói Ti?ng Vi?t, có các d?ch v? h? tr? ngôn ng? mi?n phí dành cho b?n. G?i s? [phone].         [facility] complies with applicable Federal civil rights laws and does not discriminate on the basis of race, color, national origin, age, disability, or sex.

## 2017-05-16 NOTE — PATIENT INSTRUCTIONS
Stye/exteranal nasal RUL  Expressed contents with patient's permission.  Tobramycin drops in OD tid x 7 day for prophylaxis, to keep eyeball from being infected secondarily.  Regular and diligent appliacation of warm compresses to RUL  Defer oral antibiotic.  Call/return in a few days if any apparent worsening of signs/symptoms.   Allergic to keflex and generic for bactrim.

## 2017-05-24 ENCOUNTER — TELEPHONE (OUTPATIENT)
Dept: ORTHOPEDICS | Facility: CLINIC | Age: 72
End: 2017-05-24

## 2017-05-24 NOTE — TELEPHONE ENCOUNTER
----- Message from Angelica Wolff sent at 5/24/2017 10:44 AM CDT -----  Contact: self@ home   Pt is calling to get her PT order sent to [facility].

## 2017-05-24 NOTE — TELEPHONE ENCOUNTER
Spoke with patient to make her aware that I am sending referral over now. Patient will follow up with [facility] physical therapy later today or tomorrow for a start date.

## 2017-05-26 ENCOUNTER — OFFICE VISIT (OUTPATIENT)
Dept: OPTOMETRY | Facility: CLINIC | Age: 72
End: 2017-05-26
Payer: COMMERCIAL

## 2017-05-26 DIAGNOSIS — H00.11 CHALAZION OF RIGHT UPPER EYELID: Primary | ICD-10-CM

## 2017-05-26 DIAGNOSIS — H00.011 HORDEOLUM EXTERNUM OF RIGHT UPPER EYELID: ICD-10-CM

## 2017-05-26 DIAGNOSIS — H25.13 NUCLEAR SCLEROSIS, BILATERAL: ICD-10-CM

## 2017-05-26 PROCEDURE — 99999 PR PBB SHADOW E&M-EST. PATIENT-LVL IV: CPT | Mod: PBBFAC,,, | Performed by: OPTOMETRIST

## 2017-05-26 PROCEDURE — 99499 UNLISTED E&M SERVICE: CPT | Mod: S$GLB,,, | Performed by: OPTOMETRIST

## 2017-05-26 NOTE — PROGRESS NOTES
HPI     Stye    Additional comments: f/u            Comments   Patient in for progress check.    Being followed for (diagnosis):  Stye     Date last seen:  05/16/17    Doctor last seen:  Dr. Pelayo     Prescribed eye medications(s) using:  Tobramycin OD tid x 7 days     OTC eye medication(s) using:  No    Signs/symptoms of condition resolved/better/stable/worse?:  Not resolved     Allergies/Medications reviewed today?  Yes              Last edited by Kristy Zarate on 5/26/2017 10:12 AM. (History)            Assessment /Plan     For exam results, see Encounter Report.    1. Chalazion of right upper eyelid  Ambulatory Referral to Ophthalmology   2. Hordeolum externum of right upper eyelid     3. Nuclear sclerosis, bilateral                    Refer to Dr. White for evaluation of chalazion of nasal RUL, presumably secondary to stye/external hordeolum.  No improvement with diligent application of warm compresses to RUL.  Patient concerned that the chalazion will get larger, as it did on AGUSTIN, and she requests consult with Dr. White.  Okay to discontinue tobramycin drops in OD, as external hordeolum does not appear to be draining.  Generate referral to Dr. White.  In the interim, continue regular and diligent application of warm compresses to RUL, as previously advised,

## 2017-05-26 NOTE — PATIENT INSTRUCTIONS
Refer to Dr. White for evaluation of chalazion of nasal RUL, presumably secondary to stye/external hordeolum.  No improvement with diligent application of warm compresses to RUL.  Patient concerned that the chalazion will get larger, as it did on AGUSTIN, and she requests consult with Dr. White.  Okay to discontinue tobramycin drops in OD, as external hordeolum does not appear to be draining.  Generate referral to Dr. White.  In the interim, continue regular and diligent application of warm compresses to RUL, as previously advised,

## 2017-06-05 ENCOUNTER — TELEPHONE (OUTPATIENT)
Dept: ORTHOPEDICS | Facility: CLINIC | Age: 72
End: 2017-06-05

## 2017-06-05 DIAGNOSIS — Z96.652 STATUS POST TOTAL LEFT KNEE REPLACEMENT: Primary | ICD-10-CM

## 2017-06-05 NOTE — TELEPHONE ENCOUNTER
----- Message from Kim Colorado sent at 6/5/2017  4:09 PM CDT -----  Contact: self/home  Pt would like to speak with you regarding her order for outpatient PT being sent to [facility].

## 2017-06-11 DIAGNOSIS — M75.31 CALCIFIC TENDONITIS OF RIGHT SHOULDER: ICD-10-CM

## 2017-06-11 RX ORDER — IBUPROFEN 800 MG/1
TABLET ORAL
Qty: 90 TABLET | Refills: 2 | Status: SHIPPED | OUTPATIENT
Start: 2017-06-11 | End: 2018-01-03

## 2017-06-16 ENCOUNTER — TELEPHONE (OUTPATIENT)
Dept: ORTHOPEDICS | Facility: CLINIC | Age: 72
End: 2017-06-16

## 2017-06-16 RX ORDER — CYCLOBENZAPRINE HCL 10 MG
10 TABLET ORAL 2 TIMES DAILY PRN
COMMUNITY
End: 2018-11-08 | Stop reason: HOSPADM

## 2017-06-16 NOTE — TELEPHONE ENCOUNTER
----- Message from Amy Jarquin sent at 6/16/2017 10:56 AM CDT -----  Contact: pharmacy   Charisma  [facility] called regarding cyclobenzaprine (FLEXERIL) 10 MG tablet  for the above pt .Pharmacy stated this medication only comes in generic form request call back to advise what doctor prefers        Pharmacy can be contacted @  [phone]  Fax # [phone]

## 2017-06-19 ENCOUNTER — PATIENT MESSAGE (OUTPATIENT)
Dept: RESEARCH | Facility: HOSPITAL | Age: 72
End: 2017-06-19

## 2017-06-23 ENCOUNTER — PATIENT MESSAGE (OUTPATIENT)
Dept: INTERNAL MEDICINE | Facility: CLINIC | Age: 72
End: 2017-06-23

## 2017-06-27 ENCOUNTER — TELEPHONE (OUTPATIENT)
Dept: FAMILY MEDICINE | Facility: CLINIC | Age: 72
End: 2017-06-27

## 2017-06-27 ENCOUNTER — OFFICE VISIT (OUTPATIENT)
Dept: ORTHOPEDICS | Facility: CLINIC | Age: 72
End: 2017-06-27
Payer: COMMERCIAL

## 2017-06-27 VITALS — BODY MASS INDEX: 40.13 KG/M2 | HEIGHT: 62 IN | WEIGHT: 218.06 LBS

## 2017-06-27 DIAGNOSIS — Z96.652 STATUS POST TOTAL LEFT KNEE REPLACEMENT: Primary | ICD-10-CM

## 2017-06-27 PROCEDURE — 99999 PR PBB SHADOW E&M-EST. PATIENT-LVL II: CPT | Mod: PBBFAC,,, | Performed by: ORTHOPAEDIC SURGERY

## 2017-06-27 PROCEDURE — 99024 POSTOP FOLLOW-UP VISIT: CPT | Mod: S$GLB,,, | Performed by: ORTHOPAEDIC SURGERY

## 2017-06-27 RX ORDER — AMOXICILLIN 500 MG/1
TABLET, FILM COATED ORAL
COMMUNITY
Start: 2017-06-26 | End: 2017-06-28

## 2017-06-27 NOTE — PROGRESS NOTES
Patient is here today for 6 week PO FU of her TKA. She is progressing well.     xrays are  reviewed today with good alignment.    Plan; Patient to return in 6 weeks.

## 2017-06-27 NOTE — TELEPHONE ENCOUNTER
----- Message from Amelia Lan sent at 6/27/2017  7:22 AM CDT -----  _  1st Request  _  2nd Request  _  3rd Request        Who: patient    Why: pt is calling because her Blood pressure is out of control, please call and advise. Our first available appt is in august    What Number to Call Back:[phone]    When to Expect a call back: (Before the end of the day)   -- if the call is after 12:00, the call back will be tomorrow.

## 2017-06-27 NOTE — TELEPHONE ENCOUNTER
The patient was scheduled an appointment for 6/28/17.  Please call the patient to inform her.Thanks.

## 2017-06-28 ENCOUNTER — OFFICE VISIT (OUTPATIENT)
Dept: FAMILY MEDICINE | Facility: CLINIC | Age: 72
End: 2017-06-28
Attending: FAMILY MEDICINE
Payer: COMMERCIAL

## 2017-06-28 VITALS
WEIGHT: 217.63 LBS | HEIGHT: 62 IN | DIASTOLIC BLOOD PRESSURE: 84 MMHG | SYSTOLIC BLOOD PRESSURE: 132 MMHG | OXYGEN SATURATION: 96 % | BODY MASS INDEX: 40.05 KG/M2 | HEART RATE: 85 BPM

## 2017-06-28 DIAGNOSIS — I10 HTN (HYPERTENSION), BENIGN: Primary | ICD-10-CM

## 2017-06-28 PROCEDURE — 99999 PR PBB SHADOW E&M-EST. PATIENT-LVL III: CPT | Mod: PBBFAC,,, | Performed by: FAMILY MEDICINE

## 2017-06-28 PROCEDURE — 3045F PR MOST RECENT HEMOGLOBIN A1C LEVEL 7.0-9.0%: CPT | Mod: S$GLB,,, | Performed by: FAMILY MEDICINE

## 2017-06-28 PROCEDURE — 1125F AMNT PAIN NOTED PAIN PRSNT: CPT | Mod: S$GLB,,, | Performed by: FAMILY MEDICINE

## 2017-06-28 PROCEDURE — 4010F ACE/ARB THERAPY RXD/TAKEN: CPT | Mod: S$GLB,,, | Performed by: FAMILY MEDICINE

## 2017-06-28 PROCEDURE — 1159F MED LIST DOCD IN RCRD: CPT | Mod: S$GLB,,, | Performed by: FAMILY MEDICINE

## 2017-06-28 PROCEDURE — 99213 OFFICE O/P EST LOW 20 MIN: CPT | Mod: S$GLB,,, | Performed by: FAMILY MEDICINE

## 2017-06-28 NOTE — PATIENT INSTRUCTIONS
Your test results will be communicated to you via : My [facility], Telephone or Letter.   If you have not received your test results in one week, please contact the clinic at [phone].

## 2017-06-30 NOTE — PROGRESS NOTES
"Subjective:       Patient ID: Kaylee Romero is a 72 y.o. female.    Chief Complaint: Hypertension    HPI   Pt is here for follow up of htn stable on norvasc no ankle swelling hctz no muscle cramps and arb no sob/cp acceptable bp today   Review of Systems   Constitutional: Negative for chills, fatigue and fever.   Respiratory: Negative for cough, chest tightness and shortness of breath.    Cardiovascular: Negative for chest pain and palpitations.   Gastrointestinal: Negative for abdominal distention, abdominal pain and blood in stool.   Endocrine: Negative for polydipsia, polyphagia and polyuria.       Objective:      Physical Exam   Constitutional: She appears well-developed and well-nourished. No distress.   Cardiovascular: Normal rate and regular rhythm.  Exam reveals no gallop.    Pulmonary/Chest: Effort normal and breath sounds normal. No respiratory distress. She has no rales.   Abdominal: Soft. Bowel sounds are normal. She exhibits no distension. There is no tenderness.     labs discussed with pt   Assessment:       1. HTN (hypertension), benign    2. Uncontrolled type 2 diabetes mellitus without complication, without long-term current use of insulin        Plan:     orders declined  Ada diet  Low fat low salt diet  Graded exercise  rtc quarterly        "This note will not be shared with the patient."   "

## 2017-07-03 RX ORDER — HYDROCHLOROTHIAZIDE 12.5 MG/1
TABLET ORAL
Qty: 30 TABLET | Refills: 0 | Status: SHIPPED | OUTPATIENT
Start: 2017-07-03 | End: 2017-08-08 | Stop reason: SDUPTHER

## 2017-07-05 ENCOUNTER — LAB VISIT (OUTPATIENT)
Dept: LAB | Facility: HOSPITAL | Age: 72
End: 2017-07-05
Payer: COMMERCIAL

## 2017-07-05 DIAGNOSIS — E11.9 TYPE 2 DIABETES MELLITUS NOT AT GOAL: ICD-10-CM

## 2017-07-05 LAB
ESTIMATED AVG GLUCOSE: 177 MG/DL
HBA1C MFR BLD HPLC: 7.8 %

## 2017-07-05 PROCEDURE — 36415 COLL VENOUS BLD VENIPUNCTURE: CPT

## 2017-07-05 PROCEDURE — 83036 HEMOGLOBIN GLYCOSYLATED A1C: CPT

## 2017-07-07 RX ORDER — AMLODIPINE BESYLATE 10 MG/1
TABLET ORAL
Qty: 90 TABLET | Refills: 3 | Status: SHIPPED | OUTPATIENT
Start: 2017-07-07 | End: 2017-10-24

## 2017-07-11 ENCOUNTER — OFFICE VISIT (OUTPATIENT)
Dept: PODIATRY | Facility: CLINIC | Age: 72
End: 2017-07-11
Payer: COMMERCIAL

## 2017-07-11 ENCOUNTER — OFFICE VISIT (OUTPATIENT)
Dept: ENDOCRINOLOGY | Facility: CLINIC | Age: 72
End: 2017-07-11
Payer: COMMERCIAL

## 2017-07-11 VITALS
WEIGHT: 215 LBS | HEIGHT: 62 IN | SYSTOLIC BLOOD PRESSURE: 134 MMHG | DIASTOLIC BLOOD PRESSURE: 82 MMHG | BODY MASS INDEX: 39.56 KG/M2 | HEART RATE: 78 BPM

## 2017-07-11 VITALS
DIASTOLIC BLOOD PRESSURE: 83 MMHG | BODY MASS INDEX: 39.77 KG/M2 | WEIGHT: 216.13 LBS | HEIGHT: 62 IN | HEART RATE: 89 BPM | SYSTOLIC BLOOD PRESSURE: 142 MMHG

## 2017-07-11 DIAGNOSIS — I10 ESSENTIAL HYPERTENSION: ICD-10-CM

## 2017-07-11 DIAGNOSIS — E78.5 HYPERLIPIDEMIA, UNSPECIFIED HYPERLIPIDEMIA TYPE: ICD-10-CM

## 2017-07-11 DIAGNOSIS — E11.49 TYPE II DIABETES MELLITUS WITH NEUROLOGICAL MANIFESTATIONS: Primary | ICD-10-CM

## 2017-07-11 DIAGNOSIS — E11.21 TYPE 2 DIABETES MELLITUS WITH MACROALBUMINURIC DIABETIC NEPHROPATHY: Primary | ICD-10-CM

## 2017-07-11 DIAGNOSIS — G47.33 OBSTRUCTIVE SLEEP APNEA: ICD-10-CM

## 2017-07-11 PROCEDURE — 4010F ACE/ARB THERAPY RXD/TAKEN: CPT | Mod: S$GLB,,, | Performed by: NURSE PRACTITIONER

## 2017-07-11 PROCEDURE — 99999 PR PBB SHADOW E&M-EST. PATIENT-LVL IV: CPT | Mod: PBBFAC,,, | Performed by: PODIATRIST

## 2017-07-11 PROCEDURE — 3045F PR MOST RECENT HEMOGLOBIN A1C LEVEL 7.0-9.0%: CPT | Mod: S$GLB,,, | Performed by: PODIATRIST

## 2017-07-11 PROCEDURE — 1126F AMNT PAIN NOTED NONE PRSNT: CPT | Mod: S$GLB,,, | Performed by: NURSE PRACTITIONER

## 2017-07-11 PROCEDURE — 2022F DILAT RTA XM EVC RTNOPTHY: CPT | Mod: S$GLB,,, | Performed by: NURSE PRACTITIONER

## 2017-07-11 PROCEDURE — 1159F MED LIST DOCD IN RCRD: CPT | Mod: S$GLB,,, | Performed by: PODIATRIST

## 2017-07-11 PROCEDURE — 99214 OFFICE O/P EST MOD 30 MIN: CPT | Mod: S$GLB,,, | Performed by: NURSE PRACTITIONER

## 2017-07-11 PROCEDURE — 99999 PR PBB SHADOW E&M-EST. PATIENT-LVL IV: CPT | Mod: PBBFAC,,, | Performed by: NURSE PRACTITIONER

## 2017-07-11 PROCEDURE — 99202 OFFICE O/P NEW SF 15 MIN: CPT | Mod: S$GLB,,, | Performed by: PODIATRIST

## 2017-07-11 PROCEDURE — 1159F MED LIST DOCD IN RCRD: CPT | Mod: S$GLB,,, | Performed by: NURSE PRACTITIONER

## 2017-07-11 PROCEDURE — 4010F ACE/ARB THERAPY RXD/TAKEN: CPT | Mod: S$GLB,,, | Performed by: PODIATRIST

## 2017-07-11 PROCEDURE — 1126F AMNT PAIN NOTED NONE PRSNT: CPT | Mod: S$GLB,,, | Performed by: PODIATRIST

## 2017-07-11 PROCEDURE — 3045F PR MOST RECENT HEMOGLOBIN A1C LEVEL 7.0-9.0%: CPT | Mod: S$GLB,,, | Performed by: NURSE PRACTITIONER

## 2017-07-11 RX ORDER — AMOXICILLIN 500 MG/1
CAPSULE ORAL
COMMUNITY
Start: 2017-07-10 | End: 2017-10-24
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
## 2017-07-11 NOTE — LETTER
July 16, 2017      Lexy Nicole MD  [address] [facility]  [address]  [facility] [address]           [facility]  [address]  [place] [address]  Phone: [phone]          Patient: Kaylee Romero   MR Number: [id number]   YOB: 1945   Date of Visit: 7/11/2017       Dear Dr. Lexy Nicole:    Thank you for referring Kaylee Romero to me for evaluation. Attached you will find relevant portions of my assessment and plan of care.    If you have questions, please do not hesitate to call me. I look forward to following Kaylee Romero along with you.    Sincerely,    Puneet Ramos DPM    Enclosure  CC:  No Recipients    If you would like to receive this communication electronically, please contact [email or web address] or [phone] to request more information on [employer] Link access.    For providers and/or their staff who would like to refer a patient to [facility], please contact us through our one-stop-shop provider referral line, [facility], at [phone].    If you feel you have received this communication in error or would no longer like to receive these types of communications, please e-mail [email or web address]

## 2017-07-11 NOTE — PATIENT INSTRUCTIONS
Diabetes: Inspecting Your Feet  Diabetes increases your chances of developing foot problems. So inspect your feet every day. This helps you find small skin irritations before they become serious infections. If you have trouble seeing the bottoms of your feet, use a mirror or ask a family member or friend to help.    How to check your feet  Below are tips to help you look for foot problems. Try to check your feet at the same time each day, such as when you get out of bed in the morning:  · Check the top of each foot. The tops of toes, back of the heel, and outer edge of the foot can get a lot of rubbing from poor-fitting shoes.  · Check the bottom of each foot. Daily wear and tear often leads to problems at pressure spots.  · Check the toes and nails. Fungal infections often occur between toes. Toenail problems can also be a sign of fungal infections or lead to breaks in the skin.  · Check your shoes, too. Loose objects inside a shoe can injure the foot. Use your hand to feel inside your shoes for things like gissell, loose stitching, or rough areas that could irritate your skin.  Warning signs  Look for any color changes in the foot. Redness with streaks can signal a severe infection, which needs immediate medical attention. Tell your doctor right away if you have any of these problems:  · Swelling, sometimes with color changes, may be a sign of poor blood flow or infection. Symptoms include tenderness and an increase in the size of your foot.  · Warm or hot areas on your feet may be signs of infection. A foot that is cold may not be getting enough blood.  · Sensations such as burning, tingling, or pins and needles can be signs of a problem. Also check for areas that may be numb.  · Hot spots are caused by friction or pressure. Look for hot spots in areas that get a lot of rubbing. Hot spots can turn into blisters, calluses, or sores.  · Cracks and sores are caused by dry or irritated skin. They are a sign that  the skin is breaking down, which can lead to infection.  · Toenail problems to watch for include nails growing into the skin (ingrown toenail) and causing redness or pain. Thick, yellow, or discolored nails can signal a fungal infection.  · Drainage and odor can develop from untreated sores and ulcers. Call your doctor right away if you notice white or yellow drainage, bleeding, or unpleasant odor.   © 7177-1351 [employer]. [address] [place] [address], [place] [address]. All rights reserved. This information is not intended as a substitute for professional medical care. Always follow your healthcare professional's instructions.    Diabetic Foot Care    Diabetes can lead to a number of different foot complications. Fortunately, most of these complications can be prevented with a little extra foot care. If diabetes is not well controlled, the high blood sugar can cause damage to blood vessels and result in poor circulation to the foot. When the skin does not get enough blood flow, it becomes prone to pressure sores and ulcers, which heal slowly.  High blood sugar can also damage nerves, interfering with the ability to feel pain and pressure. When you cant feel your foot normally, it is easy to injure your skin, bones and joints without knowing it. For these reasons diabetes increases the risk of fungal infections, bunions and ulcers. Deep ulcers can lead to bone infection. Gangrene is the most serious foot complication of diabetes. It usually occurs on the tips of the toes as blacked areas of skin. The black area is dead tissue. In severe cases, gangrene spreads to involve the entire toe, other toes and the entire foot. Foot or toe amputation may be required. Good foot care and blood sugar control can prevent this.    Home Care  1. Wear comfortable, proper fitting shoes.  2. Wash your feet daily with warm water and mild soap.  3. After drying, apply a moisturizing cream or lotion.  4. Check your feet daily  for skin breaks, blisters, swelling, or redness. Look between your toes also.  5. Wear cotton socks and change them every day.  6. Trim toe nails carefully and do not cut your cuticles.  7. Strive to keep your blood sugar under control with a combination of medicines, diet and activity.  8. If you smoke and have diabetes, it is very important that you stop. Smoking reduces blood flow to your foot.  9. Avoid activities that increase your risk of foot injury:  · Do not walk barefoot.  · Do not use heating pads or hot water bottles on your feet.  · Do not put your foot in a hot tub without first checking the temperature with your hand.  10) Schedule yearly foot exams.    Follow Up  with your doctor or as advised by our staff. Report any cut, puncture, scrape, other injury, blister, ingrown toenail or ulcer on your foot.    Get Prompt Medical Attention  if any of the following occur:  -- Open ulcer with pus draining from the wound  -- Increasing foot or leg pain  -- New areas of redness or swelling or tender areas of the foot    © 7929-9299 [employer] [address] [place] [address]. All rights reserved. This information is not intended as a substitute for professional medical care. Always follow your healthcare professional's instructions.

## 2017-07-12 NOTE — PROGRESS NOTES
Subjective:       Patient ID: Kaylee Romero is a 72 y.o. female.    Chief Complaint: Diabetes Mellitus (dry skin on her feet); Diabetic Foot Exam (PCP Dr. Nicole 6/28/17); Routine Foot Care; and Nail Problem (toenail fungus)    HPI   Pt is a 73 y/o male  has a past medical history of Acid reflux; Arthritis; Cataract; Diabetes mellitus, type 2; Dry mouth; Hyperlipidemia; Hypertension; and KAMRAN (obstructive sleep apnea). Pt presents to clinic for referral from primary for diabetic foot exam. Pt relates she has fungal toe nails which her  trims without problem and has dry skin. No other pedal complaints at this time.     Review of Systems   Constitutional: Negative.    HENT: Negative.    Eyes: Negative.    Respiratory: Negative.    Cardiovascular: Negative.    Gastrointestinal: Negative.    Endocrine: Negative.    Genitourinary: Negative.    Musculoskeletal: Negative.    Skin: Negative.    Allergic/Immunologic: Negative.    Neurological: Negative.    Hematological: Negative.    Psychiatric/Behavioral: Negative.        Objective:      Physical Exam   Constitutional: She is oriented to person, place, and time. She appears well-developed and well-nourished.   Cardiovascular: Intact distal pulses.    DP and PT pulses 2/4 loreta. No edema noted loreta. Capillary refill time < 3 seconds to digits 1-5, loreta.    Musculoskeletal: Normal range of motion. She exhibits deformity. She exhibits no edema or tenderness.   HAV noted to loreta 1st MPJ. 5/5 strength to all LE muscle groups. No POP noted   Neurological: She is alert and oriented to person, place, and time.   Sensation intact to digits, loreta, via SWMF   Skin: Skin is warm and dry. Capillary refill takes 2 to 3 seconds. No erythema.   Psychiatric: She has a normal mood and affect. Her behavior is normal. Judgment and thought content normal.       Assessment:       1. Type II diabetes mellitus with neurological manifestations        Plan:              -I counseled the pt  on her condition and management    -Shoe inspection. Diabetic Foot Education. Patient reminded of the importance of good nutrition and blood sugar control to help prevent podiatric complications of diabetes. Patient instructed on proper foot hygeine. We discussed wearing proper shoe gear, daily foot inspections, never walking without protective shoe gear    - Discussed all treatment options such as topical, oral, laser treatments vs surgical removal of nail permanent vs non-permanent in detail pertaining to nail fungus. She will consider vicks vapor rub to nails.      -Pt to f/u in one year or sooner PRN

## 2017-07-24 ENCOUNTER — PROCEDURE VISIT (OUTPATIENT)
Dept: OPHTHALMOLOGY | Facility: CLINIC | Age: 72
End: 2017-07-24
Payer: COMMERCIAL

## 2017-07-24 VITALS — SYSTOLIC BLOOD PRESSURE: 136 MMHG | DIASTOLIC BLOOD PRESSURE: 81 MMHG | HEART RATE: 99 BPM

## 2017-07-24 DIAGNOSIS — H00.11 CHALAZION OF RIGHT UPPER EYELID: Primary | ICD-10-CM

## 2017-07-24 PROCEDURE — 67800 REMOVE EYELID LESION: CPT | Mod: E3,S$GLB,, | Performed by: OPHTHALMOLOGY

## 2017-07-24 PROCEDURE — 92014 COMPRE OPH EXAM EST PT 1/>: CPT | Mod: 57,S$GLB,, | Performed by: OPHTHALMOLOGY

## 2017-07-24 PROCEDURE — 92285 EXTERNAL OCULAR PHOTOGRAPHY: CPT | Mod: S$GLB,,, | Performed by: OPHTHALMOLOGY

## 2017-07-24 RX ORDER — OXYCODONE AND ACETAMINOPHEN 5; 325 MG/1; MG/1
1 TABLET ORAL EVERY 6 HOURS PRN
Qty: 6 TABLET | Refills: 0 | Status: SHIPPED | OUTPATIENT
Start: 2017-07-24 | End: 2017-08-08

## 2017-07-24 NOTE — PROGRESS NOTES
HPI     Chalazion removal OD   Pt does not have any complaints   Pt denies eye pain     Last edited by ELENA Truong on 7/24/2017  2:12 PM. (History)            Assessment /Plan     For exam results, see Encounter Report.    Chalazion of right upper eyelid  -     External Photography - OU - Both Eyes    Other orders  -     oxycodone-acetaminophen (ROXICET) 5-325 mg per tablet; Take 1 tablet by mouth every 6 (six) hours as needed for Pain.  Dispense: 6 tablet; Refill: 0        Procedure note: Chalazion excision of the right eye  Consent was obtained and timeout was performed. After alcohol swab, approx 3 mL of 1% lidocaine with epi, 0.5% marcaine with vitrase was injected in the area of the chalazion on the upper eyelid of the right eye. Area was cleaned using betadine then draped in a sterile fashion. Chalazion clamp was inserted and lid was everted. Using 11 blade scalpel a stab incision was made perpendicular to the palpebral fissure and using a chalazion curette, the contents of the chalazion were removed. Some loculation was noted but good drainage was achieved. The capsule was excised with adrianne scissors. Chalazion area was cauterized along the edges of the incision. Pressure was held on the area and good hemostasis was noted. Tobradex ointment was inserted in the eyelid. The patient tolerated the procedure well without any complications.    Post-operative instructions were given to the patient.     Return prn    I have reviewed and concur with the resident's history, physical, assessment, and plan.  I have personally interviewed and examined the patient.

## 2017-07-26 ENCOUNTER — PATIENT MESSAGE (OUTPATIENT)
Dept: ENDOCRINOLOGY | Facility: CLINIC | Age: 72
End: 2017-07-26

## 2017-07-26 DIAGNOSIS — E11.21 TYPE 2 DIABETES MELLITUS WITH MACROALBUMINURIC DIABETIC NEPHROPATHY: ICD-10-CM

## 2017-08-07 ENCOUNTER — TELEPHONE (OUTPATIENT)
Dept: OPHTHALMOLOGY | Facility: CLINIC | Age: 72
End: 2017-08-07

## 2017-08-07 NOTE — TELEPHONE ENCOUNTER
----- Message from Ba Huber sent at 8/7/2017 10:40 AM CDT -----  Contact: Kaylee Romero [[id number]]  Pt states her eye is not healing correctly following Sx,pt wants to be seen as soon as possible,please call back at [phone],thanks

## 2017-08-08 ENCOUNTER — OFFICE VISIT (OUTPATIENT)
Dept: ORTHOPEDICS | Facility: CLINIC | Age: 72
End: 2017-08-08
Payer: COMMERCIAL

## 2017-08-08 ENCOUNTER — TELEPHONE (OUTPATIENT)
Dept: OPHTHALMOLOGY | Facility: CLINIC | Age: 72
End: 2017-08-08

## 2017-08-08 ENCOUNTER — OFFICE VISIT (OUTPATIENT)
Dept: OPHTHALMOLOGY | Facility: CLINIC | Age: 72
End: 2017-08-08
Payer: COMMERCIAL

## 2017-08-08 VITALS — BODY MASS INDEX: 40.25 KG/M2 | WEIGHT: 218.69 LBS | HEIGHT: 62 IN

## 2017-08-08 DIAGNOSIS — Z98.890 POST-OPERATIVE STATE: Primary | ICD-10-CM

## 2017-08-08 DIAGNOSIS — H00.11 CHALAZION OF RIGHT UPPER EYELID: ICD-10-CM

## 2017-08-08 DIAGNOSIS — Z96.652 TOTAL KNEE REPLACEMENT STATUS, LEFT: Primary | ICD-10-CM

## 2017-08-08 PROCEDURE — 92285 EXTERNAL OCULAR PHOTOGRAPHY: CPT | Mod: S$GLB,,, | Performed by: OPHTHALMOLOGY

## 2017-08-08 PROCEDURE — 99024 POSTOP FOLLOW-UP VISIT: CPT | Mod: S$GLB,,, | Performed by: ORTHOPAEDIC SURGERY

## 2017-08-08 PROCEDURE — 99024 POSTOP FOLLOW-UP VISIT: CPT | Mod: S$GLB,,, | Performed by: OPHTHALMOLOGY

## 2017-08-08 PROCEDURE — 99999 PR PBB SHADOW E&M-EST. PATIENT-LVL II: CPT | Mod: PBBFAC,,, | Performed by: ORTHOPAEDIC SURGERY

## 2017-08-08 RX ORDER — CIPROFLOXACIN 500 MG/1
500 TABLET ORAL 2 TIMES DAILY
Qty: 14 TABLET | Refills: 0 | Status: SHIPPED | OUTPATIENT
Start: 2017-08-08 | End: 2017-08-18

## 2017-08-08 NOTE — PROGRESS NOTES
Assessment /Plan     For exam results, see Encounter Report.    Post-operative state  -     External Photography - OU - Both Eyes    Other orders  -     ciprofloxacin HCl (CIPRO) 500 MG tablet; Take 1 tablet (500 mg total) by mouth 2 (two) times daily.  Dispense: 14 tablet; Refill: 0      Mild edema to area of chalazion removal right upper lid. Given diabetic status and sensitivity of patient will start on short course of Cipro     Will return if does not improve over next week or sooner if worsens.    I have reviewed and concur with the resident's history, physical, assessment, and plan.  I have personally interviewed and examined the patient.

## 2017-08-09 RX ORDER — HYDROCHLOROTHIAZIDE 12.5 MG/1
12.5 TABLET ORAL DAILY
Qty: 30 TABLET | Refills: 6 | Status: SHIPPED | OUTPATIENT
Start: 2017-08-09 | End: 2017-10-24 | Stop reason: SDUPTHER

## 2017-08-24 ENCOUNTER — LAB VISIT (OUTPATIENT)
Dept: LAB | Facility: HOSPITAL | Age: 72
End: 2017-08-24
Payer: COMMERCIAL

## 2017-08-24 ENCOUNTER — OFFICE VISIT (OUTPATIENT)
Dept: INTERNAL MEDICINE | Facility: CLINIC | Age: 72
End: 2017-08-24
Payer: COMMERCIAL

## 2017-08-24 VITALS
SYSTOLIC BLOOD PRESSURE: 138 MMHG | WEIGHT: 216.69 LBS | BODY MASS INDEX: 39.88 KG/M2 | DIASTOLIC BLOOD PRESSURE: 92 MMHG | HEIGHT: 62 IN | OXYGEN SATURATION: 98 % | HEART RATE: 84 BPM

## 2017-08-24 DIAGNOSIS — R05.9 COUGH: ICD-10-CM

## 2017-08-24 DIAGNOSIS — E78.5 HYPERLIPIDEMIA, UNSPECIFIED HYPERLIPIDEMIA TYPE: ICD-10-CM

## 2017-08-24 DIAGNOSIS — R19.7 DIARRHEA OF PRESUMED INFECTIOUS ORIGIN: Primary | ICD-10-CM

## 2017-08-24 DIAGNOSIS — E11.21 TYPE 2 DIABETES MELLITUS WITH MACROALBUMINURIC DIABETIC NEPHROPATHY: ICD-10-CM

## 2017-08-24 DIAGNOSIS — J02.9 SORE THROAT: ICD-10-CM

## 2017-08-24 DIAGNOSIS — I10 ESSENTIAL HYPERTENSION: ICD-10-CM

## 2017-08-24 LAB — DEPRECATED S PYO AG THROAT QL EIA: NEGATIVE

## 2017-08-24 PROCEDURE — 99214 OFFICE O/P EST MOD 30 MIN: CPT | Mod: S$GLB,,, | Performed by: NURSE PRACTITIONER

## 2017-08-24 PROCEDURE — 36415 COLL VENOUS BLD VENIPUNCTURE: CPT

## 2017-08-24 PROCEDURE — 99999 PR PBB SHADOW E&M-EST. PATIENT-LVL V: CPT | Mod: PBBFAC,,, | Performed by: NURSE PRACTITIONER

## 2017-08-24 PROCEDURE — 87081 CULTURE SCREEN ONLY: CPT

## 2017-08-24 PROCEDURE — 3075F SYST BP GE 130 - 139MM HG: CPT | Mod: S$GLB,,, | Performed by: NURSE PRACTITIONER

## 2017-08-24 PROCEDURE — 3080F DIAST BP >= 90 MM HG: CPT | Mod: S$GLB,,, | Performed by: NURSE PRACTITIONER

## 2017-08-24 PROCEDURE — 4010F ACE/ARB THERAPY RXD/TAKEN: CPT | Mod: S$GLB,,, | Performed by: NURSE PRACTITIONER

## 2017-08-24 PROCEDURE — 87880 STREP A ASSAY W/OPTIC: CPT

## 2017-08-24 PROCEDURE — 3045F PR MOST RECENT HEMOGLOBIN A1C LEVEL 7.0-9.0%: CPT | Mod: S$GLB,,, | Performed by: NURSE PRACTITIONER

## 2017-08-24 PROCEDURE — 86615 BORDETELLA ANTIBODY: CPT | Mod: 59

## 2017-08-24 PROCEDURE — 1159F MED LIST DOCD IN RCRD: CPT | Mod: S$GLB,,, | Performed by: NURSE PRACTITIONER

## 2017-08-24 PROCEDURE — 1125F AMNT PAIN NOTED PAIN PRSNT: CPT | Mod: S$GLB,,, | Performed by: NURSE PRACTITIONER

## 2017-08-24 PROCEDURE — 3008F BODY MASS INDEX DOCD: CPT | Mod: S$GLB,,, | Performed by: NURSE PRACTITIONER

## 2017-08-24 RX ORDER — AMOXICILLIN AND CLAVULANATE POTASSIUM 875; 125 MG/1; MG/1
TABLET, FILM COATED ORAL
Refills: 0 | COMMUNITY
Start: 2017-08-16 | End: 2017-10-24

## 2017-08-24 NOTE — PROGRESS NOTES
INTERNAL MEDICINE PROGRESS/URGENT CARE NOTE    CHIEF COMPLAINT     Chief Complaint   Patient presents with    Sore Throat     over 2 weeks        HPI     Kaylee Romero is a 72 y.o. female DM, HLD, HTN, KAMRAN, acid reflux, and obesity who presents for an urgent/follow up visit today.    Started with bronchitis and laryngitis - seen 8/8/2017 by ENT. Steroid shot in office. Started on Augmentin TID, continues to take.   Mucinex TID started for thick mucous.   Reports sinus infection resolved but still with sore throat, coating and bad taste in her mouth. Also with chest congestion and cough (states cough is improving).   Gargling with salt water and vinegar without relief.   Wakes up with laryngitis.   +diarrhea     CPAP for KAMRAN    GERD- complaint with prilosec.    Past Medical History:  Past Medical History:   Diagnosis Date    Acid reflux     Arthritis     Cataract     Diabetes mellitus, type 2     Dry mouth     Hyperlipidemia 12/2/2014    Hypertension     KAMRAN (obstructive sleep apnea)        Home Medications:  Prior to Admission medications    Medication Sig Start Date End Date Taking? Authorizing Provider   ACCU-CHEK FASTCLIX [facility] [place]  12/30/16  Yes Historical Provider, MD   amlodipine (NORVASC) 10 MG tablet take 1 tablet by mouth every morning 7/7/17  Yes Lexy Nicole MD   amoxicillin-clavulanate 875-125mg (AUGMENTIN) 875-125 mg per tablet TK 1 T PO  BID 7 TO 10 DAYS. 8/16/17  Yes Historical Provider, MD   blood sugar diagnostic (ONETOUCH VERIO) Strp 1 strip by Misc.(Non-Drug; Combo Route) route 2 (two) times daily. 12/30/16  Yes Jamshid Rodrigez DNP, NP   DIPHENHYDRAMINE HCL (WAL-DRYL ALLERGY ORAL) Take by mouth as needed (alergies).   Yes Historical Provider, MD   dulaglutide 1.5 mg/0.5 mL PnIj Inject 1.5 mg into the skin once a week. 7/26/17  Yes Jamshid Rodrigez DNP, NP   hydrochlorothiazide (HYDRODIURIL) 12.5 MG Tab Take 1 tablet (12.5 mg total) by mouth once daily. 8/9/17  Yes Lexy ORTIZ  MD Bonnie   metformin (GLUCOPHAGE) 500 MG tablet Take 2 tablets (1,000 mg total) by mouth daily with breakfast. 12/22/16  Yes Jamshid Rodrigez DNP, NP   metoprolol succinate (TOPROL-XL) 25 MG 24 hr tablet Take 1 tablet (25 mg total) by mouth every evening.  Patient taking differently: Take 25 mg by mouth once daily.  1/6/17 1/6/18 Yes Lexy Nicole MD   omeprazole (PRILOSEC) 40 MG capsule Take 1 capsule (40 mg total) by mouth once daily. 12/14/16  Yes Lexy Nicole MD   ONE TOUCH DELICA LANCETS 33 gauge Misc  6/7/13  Yes Historical Provider, MD   potassium chloride (MICRO-K) 10 MEQ CpSR take 1 capsule by mouth twice a day  Patient taking differently: take 1 capsule by mouth daily 8/28/15  Yes Lexy Nicole MD   valsartan (DIOVAN) 320 MG tablet take 1 tablet by mouth once daily 11/14/16  Yes Lexy Nicole MD   alcohol swabs (ALCOHOL PADS) PadM Apply 1 each topically as needed. 12/30/16   Jamshid Rodrigez DNP, NP   amoxicillin (AMOXIL) 500 MG capsule  7/10/17   Historical Provider, MD   cyanocobalamin (VITAMIN B-12) 1000 MCG tablet Take 1,000 mcg by mouth once daily. 10/8/16   Historical Provider, MD   cyclobenzaprine (FLEXERIL) 10 MG tablet Take 10 mg by mouth 2 (two) times daily as needed for Muscle spasms. Disp. # 60  No refills    [facility] MD   diphenoxylate-atropine 2.5-0.025 mg (LOMOTIL) 2.5-0.025 mg per tablet Take by mouth 3 (three) times daily as needed. 1 Tablet Oral Three times a day    Historical Provider, MD   ibuprofen (ADVIL,MOTRIN) 800 MG tablet take 1 tablet by mouth every 8 hours if needed for pain 6/11/17   Lexy Nicole MD       Review of Systems:  Review of Systems   Constitutional: Negative for chills and fever.   HENT: Positive for congestion, rhinorrhea, sore throat and voice change.    Respiratory: Positive for cough (produtive with mucous not green or thick anymore ) and chest tightness. Negative for shortness of breath and wheezing.   "  Cardiovascular: Negative for chest pain and palpitations.   Gastrointestinal: Positive for diarrhea (started x 1 week, treating with lomotil ) and nausea. Negative for abdominal pain and vomiting.   Skin: Negative for rash.       Health Maintainence:   Immunizations:  Health Maintenance       Date Due Completion Date    Influenza Vaccine 08/01/2017 1/6/2017 (Declined)    Override on 1/6/2017: Declined    Hemoglobin A1c 01/05/2018 7/5/2017    Lipid Panel 01/06/2018 1/6/2017    Colonoscopy 05/14/2018 5/14/2008    Foot Exam 07/11/2018 7/11/2017    Eye Exam 07/24/2018 7/24/2017    Mammogram 03/29/2019 3/29/2017    DEXA SCAN 03/29/2020 3/29/2017    TETANUS VACCINE 07/18/2025 7/18/2015           PHYSICAL EXAM     BP (!) 138/92 (BP Location: Right arm, Patient Position: Sitting, BP Method: Large (Manual))   Pulse 84   Ht 5' 2" (1.575 m)   Wt 98.3 kg (216 lb 11.4 oz)   SpO2 98%   BMI 39.64 kg/m²     Physical Exam   Constitutional: She is oriented to person, place, and time. She appears well-developed and well-nourished.   HENT:   Head: Normocephalic.   Right Ear: Tympanic membrane and external ear normal.   Left Ear: Tympanic membrane and external ear normal.   Nose: Nose normal.   Mouth/Throat: Uvula is midline and mucous membranes are normal. Posterior oropharyngeal erythema present. No oropharyngeal exudate or posterior oropharyngeal edema. Tonsils are 1+ on the right. Tonsils are 1+ on the left. Tonsillar exudate.   Eyes: Pupils are equal, round, and reactive to light.   Neck: Neck supple. No JVD present. No tracheal deviation present. No thyromegaly present.   Cardiovascular: Normal rate, regular rhythm, normal heart sounds and intact distal pulses.  Exam reveals no gallop and no friction rub.    No murmur heard.  Pulmonary/Chest: Effort normal and breath sounds normal. No respiratory distress. She has no wheezes. She has no rales.   Abdominal: Soft. Bowel sounds are normal. She exhibits no distension. There is " no tenderness.   Musculoskeletal: Normal range of motion. She exhibits no edema or tenderness.   Lymphadenopathy:     She has no cervical adenopathy.   Neurological: She is alert and oriented to person, place, and time.   Skin: Skin is warm and dry. No rash noted.   Psychiatric: She has a normal mood and affect. Her behavior is normal.   Vitals reviewed.      LABS     Lab Results   Component Value Date    HGBA1C 7.8 (H) 07/05/2017     CMP  Sodium   Date Value Ref Range Status   05/01/2017 137 136 - 145 mmol/L Final     Potassium   Date Value Ref Range Status   05/01/2017 4.3 3.5 - 5.1 mmol/L Final     Chloride   Date Value Ref Range Status   05/01/2017 105 95 - 110 mmol/L Final     CO2   Date Value Ref Range Status   05/01/2017 25 23 - 29 mmol/L Final     Glucose   Date Value Ref Range Status   05/01/2017 197 (H) 70 - 110 mg/dL Final     BUN, Bld   Date Value Ref Range Status   05/01/2017 18 8 - 23 mg/dL Final     Creatinine   Date Value Ref Range Status   05/01/2017 0.9 0.5 - 1.4 mg/dL Final     Calcium   Date Value Ref Range Status   05/01/2017 8.7 8.7 - 10.5 mg/dL Final     Total Protein   Date Value Ref Range Status   01/06/2017 8.6 (H) 6.0 - 8.4 g/dL Final     Albumin   Date Value Ref Range Status   01/06/2017 3.6 3.5 - 5.2 g/dL Final     Total Bilirubin   Date Value Ref Range Status   01/06/2017 0.4 0.1 - 1.0 mg/dL Final     Comment:     For infants and newborns, interpretation of results should be based  on gestational age, weight and in agreement with clinical  observations.  Premature Infant recommended reference ranges:  Up to 24 hours.............<8.0 mg/dL  Up to 48 hours............<12.0 mg/dL  3-5 days..................<15.0 mg/dL  6-29 days.................<15.0 mg/dL       Alkaline Phosphatase   Date Value Ref Range Status   01/06/2017 123 55 - 135 U/L Final     AST   Date Value Ref Range Status   01/06/2017 18 10 - 40 U/L Final     ALT   Date Value Ref Range Status   01/06/2017 16 10 - 44 U/L Final      Anion Gap   Date Value Ref Range Status   05/01/2017 7 (L) 8 - 16 mmol/L Final     eGFR if    Date Value Ref Range Status   05/01/2017 >60.0 >60 mL/min/1.73 m^2 Final     eGFR if non    Date Value Ref Range Status   05/01/2017 >60.0 >60 mL/min/1.73 m^2 Final     Comment:     Calculation used to obtain the estimated glomerular filtration  rate (eGFR) is the CKD-EPI equation. Since race is unknown   in our information system, the eGFR values for   -American and Non--American patients are given   for each creatinine result.       Lab Results   Component Value Date    WBC 4.19 04/20/2017    HGB 11.8 (L) 04/20/2017    HCT 35.3 (L) 04/20/2017    MCV 83 04/20/2017     04/20/2017     Lab Results   Component Value Date    CHOL 218 (H) 01/06/2017    CHOL 221 (H) 12/30/2016    CHOL 236 (H) 11/20/2014     Lab Results   Component Value Date    HDL 48 01/06/2017    HDL 47 12/30/2016    HDL 47 11/20/2014     Lab Results   Component Value Date    LDLCALC 150.4 01/06/2017    LDLCALC 145.4 12/30/2016    LDLCALC 163.6 (H) 11/20/2014     Lab Results   Component Value Date    TRIG 98 01/06/2017    TRIG 143 12/30/2016    TRIG 127 11/20/2014     Lab Results   Component Value Date    CHOLHDL 22.0 01/06/2017    CHOLHDL 21.3 12/30/2016    CHOLHDL 19.9 (L) 11/20/2014     Lab Results   Component Value Date    TSH 1.777 03/21/2017       ASSESSMENT/PLAN     Kaylee Romero is a 72 y.o. female with  Past Medical History:   Diagnosis Date    Acid reflux     Arthritis     Cataract     Diabetes mellitus, type 2     Dry mouth     Hyperlipidemia 12/2/2014    Hypertension     KAMRAN (obstructive sleep apnea)      Diarrhea of presumed infectious origin- following antibiotic use, will send for stool for cdiff. Start probiotics.   -     Clostridium difficile EIA    Sore throat- throat culture today. Will check pertussis IgG/A. Start amgic mouthwash as directed.   -     Bordetella pertussis /  parapertussis PCR; Future; Expected date: 08/24/2017  -     (Magic mouthwash) 1:1:1 Benadryl 12.5mg/5ml liq, aluminum & magnesium hydroxide-simehticone (Maalox), lidocaine viscous 2%; Swish and spit 5 mLs every 4 (four) hours as needed. for mouth sores  Dispense: 180 mL; Refill: 0  -     THROAT SCREEN, RAPID  -     Bordetella pertussis Antibodies with Reflex; Future; Expected date: 08/24/2017    Cough- pertussis IgG/A.   -     Bordetella pertussis / parapertussis PCR; Future; Expected date: 08/24/2017  -     (Magic mouthwash) 1:1:1 Benadryl 12.5mg/5ml liq, aluminum & magnesium hydroxide-simehticone (Maalox), lidocaine viscous 2%; Swish and spit 5 mLs every 4 (four) hours as needed. for mouth sores  Dispense: 180 mL; Refill: 0  -     Bordetella pertussis Antibodies with Reflex; Future; Expected date: 08/24/2017    Essential hypertension- at goal. Cont current meds. Low Na diet. Avoid otc oral decongestants.      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 Cont metformin. Will avoid steroid use.           Follow up as needed     Patient education provided from Ana. Patient was counseled on when and how to seek emergent care.       Marianela Agudelo MN, APRN, FNP-c   Department of Internal Medicine - [facility]  1:59 PM

## 2017-08-28 ENCOUNTER — TELEPHONE (OUTPATIENT)
Dept: INTERNAL MEDICINE | Facility: CLINIC | Age: 72
End: 2017-08-28

## 2017-08-28 LAB — BACTERIA THROAT CULT: NORMAL

## 2017-08-28 NOTE — TELEPHONE ENCOUNTER
----- Message from Leonard Llanos sent at 8/25/2017  2:10 PM CDT -----  Contact: Lab Client Services  Hi,           my name is Leonard I work in the lab. We received a specimen for Clostridium Difficile test. The test was unable to be performed due to the stool sample was formed stool. If you have any questions regarding this please contact client services at [phone]. Thank You.

## 2017-08-29 LAB
B PERT IGA SER IA-ACNC: 1.6 U/ML
B PERT IGG SER IA-ACNC: 2.7 U/ML (ref 0–0.9)
B PERT IGM SER IA-ACNC: 0.3 U/ML

## 2017-08-30 LAB
B PERT AB SPEC QL: ABNORMAL
B PERT FHA IGA SER QL: POSITIVE
B PERT FHA IGG SER QL: POSITIVE
B PERT IGA SER QL: ABNORMAL
B PERT IGG SER QL IB: POSITIVE
B PERT IGG SER QL: ABNORMAL
B PERT.PT IGA SER-ACNC: ABNORMAL

## 2017-09-12 ENCOUNTER — LAB VISIT (OUTPATIENT)
Dept: LAB | Facility: HOSPITAL | Age: 72
End: 2017-09-12
Attending: INTERNAL MEDICINE
Payer: COMMERCIAL

## 2017-09-12 ENCOUNTER — OFFICE VISIT (OUTPATIENT)
Dept: INTERNAL MEDICINE | Facility: CLINIC | Age: 72
End: 2017-09-12
Payer: COMMERCIAL

## 2017-09-12 ENCOUNTER — TELEPHONE (OUTPATIENT)
Dept: INTERNAL MEDICINE | Facility: CLINIC | Age: 72
End: 2017-09-12

## 2017-09-12 VITALS
TEMPERATURE: 98 F | HEIGHT: 63 IN | OXYGEN SATURATION: 98 % | SYSTOLIC BLOOD PRESSURE: 150 MMHG | WEIGHT: 217.63 LBS | DIASTOLIC BLOOD PRESSURE: 86 MMHG | BODY MASS INDEX: 38.56 KG/M2 | HEART RATE: 52 BPM

## 2017-09-12 DIAGNOSIS — J02.9 PHARYNGITIS, UNSPECIFIED ETIOLOGY: ICD-10-CM

## 2017-09-12 DIAGNOSIS — J02.9 PHARYNGITIS, UNSPECIFIED ETIOLOGY: Primary | ICD-10-CM

## 2017-09-12 PROCEDURE — 86615 BORDETELLA ANTIBODY: CPT | Mod: 59

## 2017-09-12 PROCEDURE — 1159F MED LIST DOCD IN RCRD: CPT | Mod: S$GLB,,, | Performed by: INTERNAL MEDICINE

## 2017-09-12 PROCEDURE — 1125F AMNT PAIN NOTED PAIN PRSNT: CPT | Mod: S$GLB,,, | Performed by: INTERNAL MEDICINE

## 2017-09-12 PROCEDURE — 99213 OFFICE O/P EST LOW 20 MIN: CPT | Mod: S$GLB,,, | Performed by: INTERNAL MEDICINE

## 2017-09-12 PROCEDURE — 3077F SYST BP >= 140 MM HG: CPT | Mod: S$GLB,,, | Performed by: INTERNAL MEDICINE

## 2017-09-12 PROCEDURE — 3008F BODY MASS INDEX DOCD: CPT | Mod: S$GLB,,, | Performed by: INTERNAL MEDICINE

## 2017-09-12 PROCEDURE — 99999 PR PBB SHADOW E&M-EST. PATIENT-LVL IV: CPT | Mod: PBBFAC,,, | Performed by: INTERNAL MEDICINE

## 2017-09-12 PROCEDURE — 3079F DIAST BP 80-89 MM HG: CPT | Mod: S$GLB,,, | Performed by: INTERNAL MEDICINE

## 2017-09-12 PROCEDURE — 36415 COLL VENOUS BLD VENIPUNCTURE: CPT

## 2017-09-12 NOTE — PROGRESS NOTES
Subjective:       Patient ID: Kaylee Romero is a 72 y.o. female.    Chief Complaint: Sore Throat    Was checked for pertussis and has antibodies A and G.  M not tested      Sore Throat    This is a recurrent problem. The current episode started 1 to 4 weeks ago. The problem has been waxing and waning. Neither side of throat is experiencing more pain than the other. There has been no fever. The pain is at a severity of 4/10. The pain is moderate. Pertinent negatives include no abdominal pain, congestion, coughing, diarrhea, drooling, ear discharge, ear pain, headaches, hoarse voice, plugged ear sensation, neck pain, shortness of breath, stridor, swollen glands, trouble swallowing or vomiting. She has had no exposure to strep or mono. Treatments tried: recent amox and augmentin. The treatment provided no relief.     Review of Systems   Constitutional: Negative for activity change, chills, fatigue and fever.   HENT: Positive for sore throat. Negative for congestion, drooling, ear discharge, ear pain, hoarse voice, nosebleeds, postnasal drip, sinus pressure and trouble swallowing.    Eyes: Negative.  Negative for visual disturbance.   Respiratory: Negative for cough, chest tightness, shortness of breath, wheezing and stridor.    Cardiovascular: Negative for chest pain.   Gastrointestinal: Negative for abdominal pain, diarrhea, nausea and vomiting.   Genitourinary: Negative for difficulty urinating, dysuria, frequency and urgency.   Musculoskeletal: Negative for arthralgias, neck pain and neck stiffness.   Skin: Negative for rash.   Neurological: Negative for dizziness, weakness and headaches.   Psychiatric/Behavioral: Negative for sleep disturbance. The patient is not nervous/anxious.        Objective:      Physical Exam   Constitutional: She is oriented to person, place, and time. She appears well-developed and well-nourished.  Non-toxic appearance. No distress.   HENT:   Head: Normocephalic and atraumatic.    Right Ear: Tympanic membrane, external ear and ear canal normal.   Left Ear: Tympanic membrane, external ear and ear canal normal.   Eyes: EOM are normal. Pupils are equal, round, and reactive to light. No scleral icterus.   Neck: Normal range of motion. Neck supple. No thyromegaly present.   Cardiovascular: Normal rate, regular rhythm and normal heart sounds.    Pulmonary/Chest: Effort normal and breath sounds normal.   Abdominal: Soft. Bowel sounds are normal. She exhibits no mass. There is no tenderness. There is no rebound.   Musculoskeletal: Normal range of motion.   Lymphadenopathy:     She has no cervical adenopathy.   Neurological: She is alert and oriented to person, place, and time. She has normal reflexes. She displays normal reflexes. No cranial nerve deficit. She exhibits normal muscle tone. Coordination normal.   Skin: Skin is warm and dry.   Psychiatric: She has a normal mood and affect. Her behavior is normal.       Assessment:       1. Pharyngitis, unspecified etiology        Plan:   Kaylee was seen today for sore throat.    Diagnoses and all orders for this visit:    Pharyngitis, unspecified etiology  -     Bordetella pertussis Antibodies with Reflex; Future  -     Ambulatory consult to ENT

## 2017-09-15 LAB
B PERT IGA SER IA-ACNC: 1.5 U/ML
B PERT IGG SER IA-ACNC: 3 U/ML (ref 0–0.9)
B PERT IGM SER IA-ACNC: 0.5 U/ML

## 2017-10-10 ENCOUNTER — LAB VISIT (OUTPATIENT)
Dept: LAB | Facility: HOSPITAL | Age: 72
End: 2017-10-10
Payer: COMMERCIAL

## 2017-10-10 DIAGNOSIS — E11.21 TYPE 2 DIABETES MELLITUS WITH MACROALBUMINURIC DIABETIC NEPHROPATHY: ICD-10-CM

## 2017-10-10 DIAGNOSIS — E78.5 HYPERLIPIDEMIA, UNSPECIFIED HYPERLIPIDEMIA TYPE: ICD-10-CM

## 2017-10-10 LAB
CHOLEST SERPL-MCNC: 180 MG/DL
CHOLEST/HDLC SERPL: 4.3 {RATIO}
ESTIMATED AVG GLUCOSE: 166 MG/DL
HBA1C MFR BLD HPLC: 7.4 %
HDLC SERPL-MCNC: 42 MG/DL
HDLC SERPL: 23.3 %
LDLC SERPL CALC-MCNC: 124.2 MG/DL
NONHDLC SERPL-MCNC: 138 MG/DL
TRIGL SERPL-MCNC: 69 MG/DL

## 2017-10-10 PROCEDURE — 83036 HEMOGLOBIN GLYCOSYLATED A1C: CPT

## 2017-10-10 PROCEDURE — 36415 COLL VENOUS BLD VENIPUNCTURE: CPT

## 2017-10-10 PROCEDURE — 80061 LIPID PANEL: CPT
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
## 2017-10-24 ENCOUNTER — OFFICE VISIT (OUTPATIENT)
Dept: ENDOCRINOLOGY | Facility: CLINIC | Age: 72
End: 2017-10-24
Payer: COMMERCIAL

## 2017-10-24 VITALS
SYSTOLIC BLOOD PRESSURE: 168 MMHG | BODY MASS INDEX: 39.71 KG/M2 | WEIGHT: 215.81 LBS | HEART RATE: 78 BPM | HEIGHT: 62 IN | DIASTOLIC BLOOD PRESSURE: 84 MMHG

## 2017-10-24 DIAGNOSIS — E11.21 TYPE 2 DIABETES MELLITUS WITH MACROALBUMINURIC DIABETIC NEPHROPATHY: Primary | ICD-10-CM

## 2017-10-24 DIAGNOSIS — E78.5 HYPERLIPIDEMIA, UNSPECIFIED HYPERLIPIDEMIA TYPE: ICD-10-CM

## 2017-10-24 DIAGNOSIS — R01.1 CARDIAC MURMUR: ICD-10-CM

## 2017-10-24 DIAGNOSIS — E11.9 TYPE 2 DIABETES MELLITUS NOT AT GOAL: ICD-10-CM

## 2017-10-24 DIAGNOSIS — I10 ESSENTIAL HYPERTENSION: ICD-10-CM

## 2017-10-24 DIAGNOSIS — G47.33 OBSTRUCTIVE SLEEP APNEA: ICD-10-CM

## 2017-10-24 LAB
CREAT UR-MCNC: 22 MG/DL
MICROALBUMIN UR DL<=1MG/L-MCNC: 188 UG/ML
MICROALBUMIN/CREATININE RATIO: 854.5 UG/MG

## 2017-10-24 PROCEDURE — 99999 PR PBB SHADOW E&M-EST. PATIENT-LVL IV: CPT | Mod: PBBFAC,,, | Performed by: NURSE PRACTITIONER

## 2017-10-24 PROCEDURE — 99214 OFFICE O/P EST MOD 30 MIN: CPT | Mod: S$GLB,,, | Performed by: NURSE PRACTITIONER

## 2017-10-24 PROCEDURE — 82570 ASSAY OF URINE CREATININE: CPT

## 2017-10-24 RX ORDER — HYDROCHLOROTHIAZIDE 12.5 MG/1
12.5 TABLET ORAL DAILY
Qty: 30 TABLET | Refills: 11 | Status: SHIPPED | OUTPATIENT
Start: 2017-10-24 | End: 2018-04-19

## 2017-10-24 RX ORDER — METFORMIN HYDROCHLORIDE 500 MG/1
1000 TABLET ORAL
Qty: 60 TABLET | Refills: 11 | Status: SHIPPED | OUTPATIENT
Start: 2017-10-24 | End: 2018-04-19

## 2017-10-24 RX ORDER — VALSARTAN 320 MG/1
320 TABLET ORAL DAILY
Qty: 90 TABLET | Refills: 3 | Status: SHIPPED | OUTPATIENT
Start: 2017-10-24 | End: 2018-04-19

## 2017-10-24 NOTE — PATIENT INSTRUCTIONS
Continue Trulicity  Resume Metformin twice daily  Resume Valsartan and HCTZ  Check BP at home 3x/week  Monitor your glucoses 2x/day and bring logs to your next visit

## 2017-11-13 ENCOUNTER — PATIENT MESSAGE (OUTPATIENT)
Dept: FAMILY MEDICINE | Facility: CLINIC | Age: 72
End: 2017-11-13

## 2017-11-13 RX ORDER — OMEPRAZOLE 20 MG/1
20 CAPSULE, DELAYED RELEASE ORAL DAILY
Qty: 90 CAPSULE | Refills: 3 | Status: SHIPPED | OUTPATIENT
Start: 2017-11-13 | End: 2018-04-19

## 2017-12-28 RX ORDER — METOPROLOL SUCCINATE 25 MG/1
TABLET, EXTENDED RELEASE ORAL
Qty: 90 TABLET | Refills: 3 | Status: SHIPPED | OUTPATIENT
Start: 2017-12-28 | End: 2018-04-19

## 2018-01-03 ENCOUNTER — TELEPHONE (OUTPATIENT)
Dept: ORTHOPEDICS | Facility: CLINIC | Age: 73
End: 2018-01-03

## 2018-01-03 ENCOUNTER — OFFICE VISIT (OUTPATIENT)
Dept: ORTHOPEDICS | Facility: CLINIC | Age: 73
End: 2018-01-03
Payer: COMMERCIAL

## 2018-01-03 ENCOUNTER — HOSPITAL ENCOUNTER (OUTPATIENT)
Dept: RADIOLOGY | Facility: HOSPITAL | Age: 73
Discharge: HOME | End: 2018-01-03
Attending: PHYSICIAN ASSISTANT
Payer: COMMERCIAL

## 2018-01-03 VITALS
SYSTOLIC BLOOD PRESSURE: 153 MMHG | BODY MASS INDEX: 39.94 KG/M2 | WEIGHT: 217.06 LBS | DIASTOLIC BLOOD PRESSURE: 98 MMHG | HEART RATE: 98 BPM | HEIGHT: 62 IN

## 2018-01-03 DIAGNOSIS — R52 PAIN: ICD-10-CM

## 2018-01-03 DIAGNOSIS — M77.8 TENDINITIS OF RIGHT SHOULDER: ICD-10-CM

## 2018-01-03 DIAGNOSIS — R52 PAIN: Primary | ICD-10-CM

## 2018-01-03 PROCEDURE — 99999 PR PBB SHADOW E&M-EST. PATIENT-LVL IV: CPT | Mod: PBBFAC,,, | Performed by: PHYSICIAN ASSISTANT

## 2018-01-03 PROCEDURE — 73030 X-RAY EXAM OF SHOULDER: CPT | Mod: TC,RT

## 2018-01-03 PROCEDURE — 99213 OFFICE O/P EST LOW 20 MIN: CPT | Mod: S$GLB,,, | Performed by: PHYSICIAN ASSISTANT

## 2018-01-03 PROCEDURE — 73030 X-RAY EXAM OF SHOULDER: CPT | Mod: 26,RT,, | Performed by: RADIOLOGY

## 2018-01-03 RX ORDER — MELOXICAM 15 MG/1
15 TABLET ORAL DAILY
Qty: 30 TABLET | Refills: 1 | Status: SHIPPED | OUTPATIENT
Start: 2018-01-03 | End: 2018-02-02

## 2018-01-03 NOTE — TELEPHONE ENCOUNTER
----- Message from Karyn Mcintyre sent at 1/3/2018  7:32 AM CST -----  Contact: self  Patient request Orthopedic patient states experienciong right shoulder pain need appointment for today   Please call patient at  [phone]

## 2018-01-03 NOTE — TELEPHONE ENCOUNTER
Ortho Telephone Triage Message  [phone]  Patient C/O: R shoulder pain X 2 months. Pt states that pain is intermittent and at rest or with movement of RUE. Pt requests Ortho appt today.  HX: Calcific tendinitis of R shoulder  9/10/15        L TKA  5/1/17  Resolution: Appt scheduled today with  ОЛГЬА Hernandez PA-C at 2:45pm with arrival at 2:15pm. Pt states understanding.    No

## 2018-01-03 NOTE — PROGRESS NOTES
SUBJECTIVE:     Chief Complaint & History of Present Illness:  Kaylee Romero is a  Established  patient 72 y.o. female who is seen here today with a complaint of    Chief Complaint   Patient presents with    Right Shoulder - Pain    .  Patient's here today for evaluation and treatment of progressively worsening right shoulder pain over the past 2-3 months.  She does not remember a specific trauma or injury to the shoulder but pain has progressed the point she has difficulty lying on that side it wakes her from sleep.  She's had no noticeable decrease in function but does have significant increases in pain with any activities greater than shoulder height.  She has taken some intermittent doses of NSAIDs both over-the-counter and prescription dosing with the little effect  On a scale of 1-10, with 10 being worst pain imaginable, he rates this pain as 4 on good days and 8 on bad days.  she describes the pain as tender and sore.    Review of patient's allergies indicates:   Allergen Reactions    Keflex [cephalexin] Other (See Comments)     Turns orange    Bactrim [sulfamethoxazole-trimethoprim]      Generic version  Sulfa makes her sick         Current Outpatient Prescriptions   Medication Sig Dispense Refill    (Magic mouthwash) 1:1:1 Benadryl 12.5mg/5ml liq, aluminum & magnesium hydroxide-simehticone (Maalox), lidocaine viscous 2% Swish and spit 5 mLs every 4 (four) hours as needed. for mouth sores 180 mL 0    ACCU-CHEK FASTCLIX [facility] [place] [place]       alcohol swabs (ALCOHOL PADS) PadM Apply 1 each topically as needed. 11 each 11    blood sugar diagnostic (ONETOUCH VERIO) Strp 1 strip by Misc.(Non-Drug; Combo Route) route 2 (two) times daily. 50 strip 11    cyanocobalamin (VITAMIN B-12) 1000 MCG tablet Take 1,000 mcg by mouth once daily.  0    cyclobenzaprine (FLEXERIL) 10 MG tablet Take 10 mg by mouth 2 (two) times daily as needed for Muscle spasms. Disp. # 60  No refills      DIPHENHYDRAMINE HCL (WAL-DRYL  ALLERGY ORAL) Take by mouth as needed (alergies).      diphenoxylate-atropine 2.5-0.025 mg (LOMOTIL) 2.5-0.025 mg per tablet Take by mouth 3 (three) times daily as needed. 1 Tablet Oral Three times a day      dulaglutide 1.5 mg/0.5 mL PnIj Inject 1.5 mg into the skin once a week. 4 Syringe 11    hydroCHLOROthiazide (HYDRODIURIL) 12.5 MG Tab Take 1 tablet (12.5 mg total) by mouth once daily. 30 tablet 11    metFORMIN (GLUCOPHAGE) 500 MG tablet Take 2 tablets (1,000 mg total) by mouth daily with breakfast. 60 tablet 11    metoprolol succinate (TOPROL-XL) 25 MG 24 hr tablet take 1 tablet once daily every evening 90 tablet 3    omeprazole (PRILOSEC) 20 MG capsule Take 1 capsule (20 mg total) by mouth once daily. 90 capsule 3    ONE TOUCH DELICA LANCETS 33 gauge Misc       potassium chloride (MICRO-K) 10 MEQ CpSR take 1 capsule by mouth twice a day (Patient taking differently: take 1 capsule by mouth daily) 180 capsule 3    valsartan (DIOVAN) 320 MG tablet Take 1 tablet (320 mg total) by mouth once daily. 90 tablet 3    meloxicam (MOBIC) 15 MG tablet Take 1 tablet (15 mg total) by mouth once daily. 30 tablet 1     No current facility-administered medications for this visit.        Past Medical History:   Diagnosis Date    Acid reflux     Arthritis     Cataract     Diabetes mellitus, type 2     Dry mouth     Hyperlipidemia 2014    Hypertension     KAMRAN (obstructive sleep apnea)        Past Surgical History:   Procedure Laterality Date    ankle surgery (l)      APPENDECTOMY       SECTION      DILATION AND CURETTAGE OF UTERUS      KNEE ARTHROSCOPY W/ DEBRIDEMENT      KNEE SURGERY Left 2017    TKR       Vital Signs (Most Recent)  Vitals:    18 1511   BP: (!) 153/98   Pulse: 98       Review of Systems:  ROS:  Constitutional: no fever or chills  Eyes: no visual changes  ENT: no nasal congestion or sore throat  Respiratory: no cough or shortness of breath  Cardiovascular: no chest  "pain or palpitations, Positive PVD  Gastrointestinal: no nausea or vomiting, tolerating diet, Positive for GERD  Genitourinary: no hematuria or dysuria, CK D stage II  Integument/Breast: no rash or pruritis  Hematologic/Lymphatic: no easy bruising or lymphadenopathy  Musculoskeletal: no arthralgias or myalgias  Neurological: no seizures or tremors  Behavioral/Psych: no auditory or visual hallucinations  Endocrine: no heat or cold intolerance, Diabetes type 2 with neuropathy      OBJECTIVE:     PHYSICAL EXAM:  Height: 5' 2" (157.5 cm) Weight: 98.5 kg (217 lb 0.7 oz), General Appearance: Well nourished, well developed, in no acute distress.  Neurological: Mood & affect are normal.  Shoulder exam: right  Tenderness: AC joint, biceps tendon, lateral acromial  ROM: forward flexion 180/180, extension 45/45, full abduction 180/180, abduction-glenohumeral 90/90, external rotation 50/50, pain at the extremes of mobility  Shoulder Strength: biceps 5/5, triceps 5/5, abduction 5/5, adduction 5/5, external rotation 5/5 with shoulder at side, flexion 5/5, and extension 5/5  positive for tenderness about the glenohumeral joint, negative for tenderness over the acromioclavicular joint and positive for impingement sign  Stability tests: anterior apprehension test negative and posterior apprehension test negative  Special Tests:Cross-chest abduction: negative                     RADIOGRAPHS:  X-rays taken today films reviewed by me demonstrate some calcific changes in the rotator cuff consistent with tendinitis and mild to moderate degenerative joint disease no evidence of fracture dislocation or other bony abnormalities    ASSESSMENT/PLAN:     Plan: We discussed with the patient at length all the different treatment options available for her rightshoulder including anti-inflammatories, acetaminophen, rest, ice, Physical therapy to include strengthening exercise, occasional cortisone injections for temporary relief, arthroscopic " surgical repair, and finally shoulder arthroplasty.   She'll elect been with conservative therapy  Meloxicam 15 mg daily with food ×10 days followed by when necessary  Ice rest and stretching to the shoulder  Follow-up in 2 weeks if symptoms not improved consider injection therapy

## 2018-01-17 ENCOUNTER — DOCUMENTATION ONLY (OUTPATIENT)
Dept: ORTHOPEDICS | Facility: CLINIC | Age: 73
End: 2018-01-17

## 2018-01-17 NOTE — PROGRESS NOTES
Notified pt.  Regarding appointment maybe cancel today 01/17/18 and reschedule due to the weather. Someone will contact pt again with updates.

## 2018-01-29 ENCOUNTER — OFFICE VISIT (OUTPATIENT)
Dept: ORTHOPEDICS | Facility: CLINIC | Age: 73
End: 2018-01-29
Payer: COMMERCIAL

## 2018-01-29 VITALS
HEART RATE: 98 BPM | DIASTOLIC BLOOD PRESSURE: 97 MMHG | SYSTOLIC BLOOD PRESSURE: 159 MMHG | WEIGHT: 219.13 LBS | BODY MASS INDEX: 40.33 KG/M2 | HEIGHT: 62 IN

## 2018-01-29 DIAGNOSIS — M25.572 ACUTE LEFT ANKLE PAIN: ICD-10-CM

## 2018-01-29 DIAGNOSIS — M77.8 TENDINITIS OF RIGHT SHOULDER: Primary | ICD-10-CM

## 2018-01-29 PROCEDURE — 99999 PR PBB SHADOW E&M-EST. PATIENT-LVL IV: CPT | Mod: PBBFAC,,, | Performed by: PHYSICIAN ASSISTANT

## 2018-01-29 PROCEDURE — 99213 OFFICE O/P EST LOW 20 MIN: CPT | Mod: S$GLB,,, | Performed by: PHYSICIAN ASSISTANT

## 2018-01-29 NOTE — PROGRESS NOTES
SUBJECTIVE:     Chief Complaint & History of Present Illness:  Kaylee Romero is a  Established  patient 73 y.o. female who is seen here today with a complaint of    Chief Complaint   Patient presents with    Right Shoulder - Pain    .  She is patient well known to me was last seen treated clinic 01/03/2018 for her right shoulder pain from which she has recovered very well she responded very well to the meloxicam.  She has some concerns about some intermittent soreness in the left ankle particularly when rising from a seated position  On a scale of 1-10, with 10 being worst pain imaginable, he rates this pain as 2 on good days and 4 on bad days.  she describes the pain as sore and aching.    Review of patient's allergies indicates:   Allergen Reactions    Keflex [cephalexin] Other (See Comments)     Turns orange    Bactrim [sulfamethoxazole-trimethoprim]      Generic version  Sulfa makes her sick         Current Outpatient Prescriptions   Medication Sig Dispense Refill    (Magic mouthwash) 1:1:1 Benadryl 12.5mg/5ml liq, aluminum & magnesium hydroxide-simehticone (Maalox), lidocaine viscous 2% Swish and spit 5 mLs every 4 (four) hours as needed. for mouth sores 180 mL 0    ACCU-CHEK FASTCLIX [facility] [place]       alcohol swabs (ALCOHOL PADS) PadM Apply 1 each topically as needed. 11 each 11    blood sugar diagnostic (ONETOUCH VERIO) Strp 1 strip by Misc.(Non-Drug; Combo Route) route 2 (two) times daily. 50 strip 11    cyanocobalamin (VITAMIN B-12) 1000 MCG tablet Take 1,000 mcg by mouth once daily.  0    cyclobenzaprine (FLEXERIL) 10 MG tablet Take 10 mg by mouth 2 (two) times daily as needed for Muscle spasms. Disp. # 60  No refills      DIPHENHYDRAMINE HCL (WAL-DRYL ALLERGY ORAL) Take by mouth as needed (jong).      diphenoxylate-atropine 2.5-0.025 mg (LOMOTIL) 2.5-0.025 mg per tablet Take by mouth 3 (three) times daily as needed. 1 Tablet Oral Three times a day      dulaglutide 1.5 mg/0.5 mL PnIj Inject  1.5 mg into the skin once a week. 4 Syringe 11    hydroCHLOROthiazide (HYDRODIURIL) 12.5 MG Tab Take 1 tablet (12.5 mg total) by mouth once daily. 30 tablet 11    meloxicam (MOBIC) 15 MG tablet Take 1 tablet (15 mg total) by mouth once daily. 30 tablet 1    metFORMIN (GLUCOPHAGE) 500 MG tablet Take 2 tablets (1,000 mg total) by mouth daily with breakfast. 60 tablet 11    metoprolol succinate (TOPROL-XL) 25 MG 24 hr tablet take 1 tablet once daily every evening 90 tablet 3    omeprazole (PRILOSEC) 20 MG capsule Take 1 capsule (20 mg total) by mouth once daily. 90 capsule 3    ONE TOUCH DELICA LANCETS 33 gauge Misc       potassium chloride (MICRO-K) 10 MEQ CpSR take 1 capsule by mouth twice a day (Patient taking differently: take 1 capsule by mouth daily) 180 capsule 3    valsartan (DIOVAN) 320 MG tablet Take 1 tablet (320 mg total) by mouth once daily. 90 tablet 3     No current facility-administered medications for this visit.        Past Medical History:   Diagnosis Date    Acid reflux     Arthritis     Cataract     Diabetes mellitus, type 2     Dry mouth     Hyperlipidemia 2014    Hypertension     KAMRAN (obstructive sleep apnea)        Past Surgical History:   Procedure Laterality Date    ankle surgery (l)      APPENDECTOMY       SECTION      DILATION AND CURETTAGE OF UTERUS      KNEE ARTHROSCOPY W/ DEBRIDEMENT      KNEE SURGERY Left 2017    TKR       Vital Signs (Most Recent)  Vitals:    18 1558   BP: (!) 159/97   Pulse: 98       Review of Systems:  ROS:  Constitutional: no fever or chills  Eyes: no visual changes  ENT: no nasal congestion or sore throat  Respiratory: no cough or shortness of breath  Cardiovascular: no chest pain or palpitations, Positive PVD  Gastrointestinal: no nausea or vomiting, tolerating diet, Positive for GERD  Genitourinary: no hematuria or dysuria, CK D stage II  Integument/Breast: no rash or pruritis  Hematologic/Lymphatic: no easy bruising  "or lymphadenopathy  Musculoskeletal: no arthralgias or myalgias  Neurological: no seizures or tremors  Behavioral/Psych: no auditory or visual hallucinations  Endocrine: no heat or cold intolerance, Diabetes type 2 with neuropathy        OBJECTIVE:     PHYSICAL EXAM:  Height: 5' 2" (157.5 cm) Weight: 99.4 kg (219 lb 2.2 oz), General Appearance: Well nourished, well developed, in no acute distress.  Neurological: Mood & affect are normal.  Shoulder exam: right  Tenderness: AC joint, biceps tendon, lateral acromial  ROM: forward flexion 180/180, extension 45/45, full abduction 180/180, abduction-glenohumeral 90/90, external rotation 50/50, pain at the extremes of mobility  Shoulder Strength: biceps 5/5, triceps 5/5, abduction 5/5, adduction 5/5, external rotation 5/5 with shoulder at side, flexion 5/5, and extension 5/5  positive for tenderness about the glenohumeral joint, negative for tenderness over the acromioclavicular joint and positive for impingement sign  Stability tests: anterior apprehension test negative and posterior apprehension test negative  Special Tests:Cross-chest abduction: negative                                   ASSESSMENT/PLAN:     Plan: We discussed with the patient at length all the different treatment options available for her rightshoulder including anti-inflammatories, acetaminophen, rest, ice, Physical therapy to include strengthening exercise, occasional cortisone injections for temporary relief, arthroscopic surgical repair, and finally shoulder arthroplasty.   Patient has received almost complete resolution of her shoulder issues.  She will continue use the meloxicam when necessary for her ankle arthritic pain along with ice and rest   Follow up when necessary    "

## 2018-03-27 ENCOUNTER — PATIENT MESSAGE (OUTPATIENT)
Dept: FAMILY MEDICINE | Facility: CLINIC | Age: 73
End: 2018-03-27

## 2018-03-27 ENCOUNTER — TELEPHONE (OUTPATIENT)
Dept: FAMILY MEDICINE | Facility: CLINIC | Age: 73
End: 2018-03-27

## 2018-03-27 DIAGNOSIS — I10 HTN (HYPERTENSION), BENIGN: ICD-10-CM

## 2018-03-27 NOTE — TELEPHONE ENCOUNTER
----- Message from Candelario Arriaza sent at 3/27/2018 10:41 AM CDT -----  Contact: Pt   Pt would like a call back regarding scheduling lab before appointment     Pt can be reached at [phone]

## 2018-04-03 ENCOUNTER — LAB VISIT (OUTPATIENT)
Dept: LAB | Facility: HOSPITAL | Age: 73
End: 2018-04-03
Attending: FAMILY MEDICINE
Payer: COMMERCIAL

## 2018-04-03 DIAGNOSIS — I10 HTN (HYPERTENSION), BENIGN: ICD-10-CM

## 2018-04-03 LAB
ALBUMIN SERPL BCP-MCNC: 3.3 G/DL
ALP SERPL-CCNC: 112 U/L
ALT SERPL W/O P-5'-P-CCNC: 15 U/L
ANION GAP SERPL CALC-SCNC: 5 MMOL/L
AST SERPL-CCNC: 16 U/L
BASOPHILS # BLD AUTO: 0.02 K/UL
BASOPHILS NFR BLD: 0.5 %
BILIRUB SERPL-MCNC: 0.2 MG/DL
BUN SERPL-MCNC: 15 MG/DL
CALCIUM SERPL-MCNC: 9.5 MG/DL
CHLORIDE SERPL-SCNC: 106 MMOL/L
CHOLEST SERPL-MCNC: 165 MG/DL
CHOLEST/HDLC SERPL: 3.8 {RATIO}
CO2 SERPL-SCNC: 26 MMOL/L
CREAT SERPL-MCNC: 1.1 MG/DL
DIFFERENTIAL METHOD: ABNORMAL
EOSINOPHIL # BLD AUTO: 0.1 K/UL
EOSINOPHIL NFR BLD: 3 %
ERYTHROCYTE [DISTWIDTH] IN BLOOD BY AUTOMATED COUNT: 14 %
EST. GFR  (AFRICAN AMERICAN): 58.4 ML/MIN/1.73 M^2
EST. GFR  (NON AFRICAN AMERICAN): 50.6 ML/MIN/1.73 M^2
ESTIMATED AVG GLUCOSE: 163 MG/DL
GLUCOSE SERPL-MCNC: 125 MG/DL
HBA1C MFR BLD HPLC: 7.3 %
HCT VFR BLD AUTO: 36.6 %
HDLC SERPL-MCNC: 44 MG/DL
HDLC SERPL: 26.7 %
HGB BLD-MCNC: 11.7 G/DL
IMM GRANULOCYTES # BLD AUTO: 0.01 K/UL
IMM GRANULOCYTES NFR BLD AUTO: 0.2 %
LDLC SERPL CALC-MCNC: 106.4 MG/DL
LYMPHOCYTES # BLD AUTO: 1.6 K/UL
LYMPHOCYTES NFR BLD: 37.4 %
MCH RBC QN AUTO: 26.5 PG
MCHC RBC AUTO-ENTMCNC: 32 G/DL
MCV RBC AUTO: 83 FL
MONOCYTES # BLD AUTO: 0.4 K/UL
MONOCYTES NFR BLD: 9.5 %
NEUTROPHILS # BLD AUTO: 2.1 K/UL
NEUTROPHILS NFR BLD: 49.4 %
NONHDLC SERPL-MCNC: 121 MG/DL
NRBC BLD-RTO: 0 /100 WBC
PLATELET # BLD AUTO: 207 K/UL
PMV BLD AUTO: 12.2 FL
POTASSIUM SERPL-SCNC: 4.8 MMOL/L
PROT SERPL-MCNC: 7.9 G/DL
RBC # BLD AUTO: 4.42 M/UL
SODIUM SERPL-SCNC: 137 MMOL/L
TRIGL SERPL-MCNC: 73 MG/DL
TSH SERPL DL<=0.005 MIU/L-ACNC: 2.78 UIU/ML
WBC # BLD AUTO: 4.31 K/UL

## 2018-04-03 PROCEDURE — 85025 COMPLETE CBC W/AUTO DIFF WBC: CPT

## 2018-04-03 PROCEDURE — 36415 COLL VENOUS BLD VENIPUNCTURE: CPT | Mod: PO

## 2018-04-03 PROCEDURE — 80053 COMPREHEN METABOLIC PANEL: CPT

## 2018-04-03 PROCEDURE — 84443 ASSAY THYROID STIM HORMONE: CPT

## 2018-04-03 PROCEDURE — 80061 LIPID PANEL: CPT

## 2018-04-03 PROCEDURE — 83036 HEMOGLOBIN GLYCOSYLATED A1C: CPT

## 2018-04-09 DIAGNOSIS — E11.9 DIABETES MELLITUS WITHOUT COMPLICATION: ICD-10-CM

## 2018-04-19 ENCOUNTER — OFFICE VISIT (OUTPATIENT)
Dept: FAMILY MEDICINE | Facility: CLINIC | Age: 73
End: 2018-04-19
Attending: FAMILY MEDICINE
Payer: COMMERCIAL

## 2018-04-19 VITALS
HEART RATE: 76 BPM | HEIGHT: 62 IN | DIASTOLIC BLOOD PRESSURE: 98 MMHG | WEIGHT: 217.38 LBS | BODY MASS INDEX: 40 KG/M2 | SYSTOLIC BLOOD PRESSURE: 146 MMHG | OXYGEN SATURATION: 97 %

## 2018-04-19 DIAGNOSIS — I10 HTN (HYPERTENSION), BENIGN: ICD-10-CM

## 2018-04-19 PROCEDURE — 3077F SYST BP >= 140 MM HG: CPT | Mod: CPTII,S$GLB,, | Performed by: FAMILY MEDICINE

## 2018-04-19 PROCEDURE — 81001 URINALYSIS AUTO W/SCOPE: CPT | Mod: S$GLB,,, | Performed by: FAMILY MEDICINE

## 2018-04-19 PROCEDURE — 99999 PR PBB SHADOW E&M-EST. PATIENT-LVL III: CPT | Mod: PBBFAC,,, | Performed by: FAMILY MEDICINE

## 2018-04-19 PROCEDURE — 3080F DIAST BP >= 90 MM HG: CPT | Mod: CPTII,S$GLB,, | Performed by: FAMILY MEDICINE

## 2018-04-19 PROCEDURE — 3045F PR MOST RECENT HEMOGLOBIN A1C LEVEL 7.0-9.0%: CPT | Mod: CPTII,S$GLB,, | Performed by: FAMILY MEDICINE

## 2018-04-19 PROCEDURE — 99214 OFFICE O/P EST MOD 30 MIN: CPT | Mod: 25,S$GLB,, | Performed by: FAMILY MEDICINE

## 2018-04-20 ENCOUNTER — PATIENT OUTREACH (OUTPATIENT)
Dept: ADMINISTRATIVE | Facility: HOSPITAL | Age: 73
End: 2018-04-20

## 2018-04-20 LAB
BILIRUB SERPL-MCNC: NEGATIVE MG/DL
BLOOD URINE, POC: NEGATIVE
COLOR, POC UA: ABNORMAL
GLUCOSE UR QL STRIP: 50
KETONES UR QL STRIP: NEGATIVE
LEUKOCYTE ESTERASE URINE, POC: NEGATIVE
NITRITE, POC UA: NEGATIVE
PH, POC UA: 7
PROTEIN, POC: 50
SPECIFIC GRAVITY, POC UA: 1.01
UROBILINOGEN, POC UA: NORMAL

## 2018-04-20 NOTE — PROGRESS NOTES
Hello from your [facility] Diabetes Management Team!         Your Lexy VELMA Nicole MD is requesting that you meet with one of our diabetes educators because your A1C is elevated. Studies show that keeping your A1C less than 7% reduces your risk for complications related to diabetes. As an important part of your care team, the diabetes education team is here to help you improve your blood sugars.   Most insurances, including Medicare and Medicaid, cover diabetes education. Appointments last for approximately 1 hour and are individually tailored to your personal goals for diabetes management.       Please call the clinic the schedule at your earliest convenience: [phone]. To speak with one of our educators directly, please call [phone].      Thank you, and we look forward to working with you.   SHARATH Alvarado, RN, Diabetes Educator       JEANETTE Hendricks  Clinical Care Coordinator

## 2018-04-24 NOTE — PROGRESS NOTES
"Subjective:       Patient ID: Kaylee Romero is a 73 y.o. female.    Chief Complaint: Diabetes    HPI   Pt is here for diabetes follow up pt is generally well pt has stopped all of her medications except the dulaglutide fbs in the low to mid 100's  This was a choice she made several months ago. She denies sob/cp her bp is elevated today.    Review of Systems   Constitutional: Negative for chills, fatigue and fever.   Respiratory: Negative for cough, chest tightness and shortness of breath.    Cardiovascular: Negative for chest pain and palpitations.   Gastrointestinal: Negative for abdominal distention and abdominal pain.   Endocrine: Negative for polydipsia, polyphagia and polyuria.       Objective:      Physical Exam   Constitutional: She appears well-developed and well-nourished. No distress.   Cardiovascular: Normal rate and regular rhythm.  Exam reveals no gallop.    Pulmonary/Chest: Effort normal and breath sounds normal. No respiratory distress. She has no rales.     labs discussed with pt   Assessment:       1. Uncontrolled type 2 diabetes mellitus without complication, without long-term current use of insulin    2. HTN (hypertension), benign        Plan:     orders cmp lipid hgb a1c  Cont meds  Ada diet  Drink plenty of water  rtc quarterly       "This note will not be shared with the patient."   "

## 2018-05-16 NOTE — PLAN OF CARE
Pt is AAOx4. VSS. C/o tolerable pain to L knee. IVF and PNC infusing WDL. Pt voids per bedpan. PT/OT completed in PACU as well as Xray. Glasses on pt's face. Will proceed with POC to transfer to assigned room at this time.   depression/anxiety

## 2018-07-12 ENCOUNTER — PATIENT MESSAGE (OUTPATIENT)
Dept: FAMILY MEDICINE | Facility: CLINIC | Age: 73
End: 2018-07-12

## 2018-07-12 DIAGNOSIS — E11.21 TYPE 2 DIABETES MELLITUS WITH MACROALBUMINURIC DIABETIC NEPHROPATHY: ICD-10-CM

## 2018-07-24 ENCOUNTER — OFFICE VISIT (OUTPATIENT)
Dept: OPTOMETRY | Facility: CLINIC | Age: 73
End: 2018-07-24
Payer: COMMERCIAL

## 2018-07-24 DIAGNOSIS — I10 ESSENTIAL HYPERTENSION: ICD-10-CM

## 2018-07-24 DIAGNOSIS — H25.13 NUCLEAR SCLEROSIS, BILATERAL: ICD-10-CM

## 2018-07-24 DIAGNOSIS — E11.9 TYPE 2 DIABETES MELLITUS WITHOUT OPHTHALMIC MANIFESTATIONS: ICD-10-CM

## 2018-07-24 DIAGNOSIS — H43.813 PVD (POSTERIOR VITREOUS DETACHMENT), BILATERAL: ICD-10-CM

## 2018-07-24 DIAGNOSIS — H52.4 PRESBYOPIA: ICD-10-CM

## 2018-07-24 DIAGNOSIS — H35.363 RETINAL DRUSEN, BILATERAL: ICD-10-CM

## 2018-07-24 DIAGNOSIS — E11.21 TYPE 2 DIABETES MELLITUS WITH MACROALBUMINURIC DIABETIC NEPHROPATHY: Primary | ICD-10-CM

## 2018-07-24 DIAGNOSIS — H52.03 HYPEROPIA, BILATERAL: ICD-10-CM

## 2018-07-24 PROCEDURE — 92014 COMPRE OPH EXAM EST PT 1/>: CPT | Mod: S$GLB,,, | Performed by: OPTOMETRIST

## 2018-07-24 PROCEDURE — 92015 DETERMINE REFRACTIVE STATE: CPT | Mod: S$GLB,,, | Performed by: OPTOMETRIST

## 2018-07-24 PROCEDURE — 99999 PR PBB SHADOW E&M-EST. PATIENT-LVL II: CPT | Mod: PBBFAC,,, | Performed by: OPTOMETRIST

## 2018-07-24 NOTE — PROGRESS NOTES
HPI     73yr old female present for Annual NIDDM DFE. Patient states vision   decreased OU in the last few months, but worse in the past week. Patient   states BSL normal range. Hemoglobin A1C       Date                     Value               Ref Range             Status                04/03/2018               7.3 (H)             4.0 - 5.6 %           Final            Patient denies floaters, flashes and headaches.     Last edited by Oc Otoole on 7/24/2018  9:09 AM. (History)            Assessment /Plan     For exam results, see Encounter Report.    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

## 2018-09-04 ENCOUNTER — PATIENT OUTREACH (OUTPATIENT)
Dept: ADMINISTRATIVE | Facility: HOSPITAL | Age: 73
End: 2018-09-04

## 2018-09-04 NOTE — PROGRESS NOTES
[facility] is committed to your overall health.  To help you get the most out of each of your visits, we will review your information to make sure you are up to date on all of your recommended tests and/or procedures.       Your PCP  Lexy Nicole MD   found that you may be due for:       Health Maintenance Due   Topic Date Due    Colonoscopy  05/14/2018    Foot Exam  07/11/2018    Influenza Vaccine  08/01/2018    Urine Microalbumin  10/24/2018             If you have had any of the above done at another facility, please bring the records or information with you so that your record at [facility] will be complete.  If you would like to schedule any of these, please contact me.     If you are currently taking medication, please bring it with you to your appointment for review.     Also, if you have any type of Advanced Directives, please bring them with you to your office visit so we may scan them into your chart.       Thank you for Choosing [facility] for your healthcare needs.        Additional Information  If you have questions, you can email [email or web address] or call [phone]  to talk to our [facility] staff. Remember, [facility] is NOT to be used for urgent needs. For medical emergencies, dial 911.

## 2018-09-05 ENCOUNTER — HOSPITAL ENCOUNTER (OUTPATIENT)
Dept: RADIOLOGY | Facility: HOSPITAL | Age: 73
Discharge: HOME | End: 2018-09-05
Attending: PHYSICIAN ASSISTANT
Payer: MEDICARE

## 2018-09-05 ENCOUNTER — OFFICE VISIT (OUTPATIENT)
Dept: ORTHOPEDICS | Facility: CLINIC | Age: 73
End: 2018-09-05
Payer: MEDICARE

## 2018-09-05 ENCOUNTER — TELEPHONE (OUTPATIENT)
Dept: ORTHOPEDICS | Facility: CLINIC | Age: 73
End: 2018-09-05

## 2018-09-05 VITALS — HEIGHT: 62 IN | WEIGHT: 217.38 LBS | BODY MASS INDEX: 40 KG/M2

## 2018-09-05 DIAGNOSIS — M25.561 ACUTE PAIN OF RIGHT KNEE: Primary | ICD-10-CM

## 2018-09-05 DIAGNOSIS — M17.11 PRIMARY OSTEOARTHRITIS OF RIGHT KNEE: ICD-10-CM

## 2018-09-05 DIAGNOSIS — M25.561 ACUTE PAIN OF RIGHT KNEE: ICD-10-CM

## 2018-09-05 PROCEDURE — 73562 X-RAY EXAM OF KNEE 3: CPT | Mod: TC,50

## 2018-09-05 PROCEDURE — 20610 DRAIN/INJ JOINT/BURSA W/O US: CPT | Mod: S$PBB,RT,, | Performed by: PHYSICIAN ASSISTANT

## 2018-09-05 PROCEDURE — 73562 X-RAY EXAM OF KNEE 3: CPT | Mod: 26,50,, | Performed by: RADIOLOGY

## 2018-09-05 PROCEDURE — 99213 OFFICE O/P EST LOW 20 MIN: CPT | Mod: S$PBB,25,, | Performed by: PHYSICIAN ASSISTANT

## 2018-09-05 PROCEDURE — 99999 PR PBB SHADOW E&M-EST. PATIENT-LVL III: CPT | Mod: PBBFAC,,, | Performed by: PHYSICIAN ASSISTANT

## 2018-09-05 PROCEDURE — 20610 DRAIN/INJ JOINT/BURSA W/O US: CPT | Mod: PBBFAC | Performed by: PHYSICIAN ASSISTANT

## 2018-09-05 PROCEDURE — 99213 OFFICE O/P EST LOW 20 MIN: CPT | Mod: PBBFAC,25 | Performed by: PHYSICIAN ASSISTANT

## 2018-09-05 RX ORDER — BETAMETHASONE SODIUM PHOSPHATE AND BETAMETHASONE ACETATE 3; 3 MG/ML; MG/ML
6 INJECTION, SUSPENSION INTRA-ARTICULAR; INTRALESIONAL; INTRAMUSCULAR; SOFT TISSUE
Status: COMPLETED | OUTPATIENT
Start: 2018-09-05 | End: 2018-09-05

## 2018-09-05 RX ADMIN — BETAMETHASONE ACETATE AND BETAMETHASONE SODIUM PHOSPHATE 6 MG: 3; 3 INJECTION, SUSPENSION INTRA-ARTICULAR; INTRALESIONAL; INTRAMUSCULAR; SOFT TISSUE at 06:09

## 2018-09-05 NOTE — PROGRESS NOTES
SUBJECTIVE:     Chief Complaint & History of Present Illness:  Kaylee Romero is a Established patient 73 y.o. female who is seen here today with a complaint of    Chief Complaint   Patient presents with    Right Knee - Pain    .  She is a patient well-known to us status post left TKA performed by  05/01/2017 from which she is doing very well.  Her concerns today revolve around pain and soreness in the right knee progressively worsening over the past 1-2 months.  There has been no specific trauma or injury to the area  On a scale of 1-10, with 10 being worst pain imaginable, he rates this pain as 3 on good days and 6 on bad days.  she describes the pain as  Tender and sore    Review of patient's allergies indicates:   Allergen Reactions    Keflex [cephalexin] Other (See Comments)     Turns orange    Bactrim [sulfamethoxazole-trimethoprim]      Generic version  Sulfa makes her sick         Current Outpatient Medications   Medication Sig Dispense Refill    (Magic mouthwash) 1:1:1 Benadryl 12.5mg/5ml liq, aluminum & magnesium hydroxide-simehticone (Maalox), lidocaine viscous 2% Swish and spit 5 mLs every 4 (four) hours as needed. for mouth sores 180 mL 0    ACCU-CHEK FASTCLIX [facility] [place]       alcohol swabs (ALCOHOL PADS) PadM Apply 1 each topically as needed. 11 each 11    blood sugar diagnostic (ONETOUCH VERIO) Strp 1 strip by Misc.(Non-Drug; Combo Route) route 2 (two) times daily. 50 strip 11    cyclobenzaprine (FLEXERIL) 10 MG tablet Take 10 mg by mouth 2 (two) times daily as needed for Muscle spasms. Disp. # 60  No refills      DIPHENHYDRAMINE HCL (WAL-DRYL ALLERGY ORAL) Take by mouth as needed (alergies).      dulaglutide (TRULICITY) 0.75 mg/0.5 mL PnIj Inject into the skin.      dulaglutide 1.5 mg/0.5 mL PnIj Inject 1.5 mg into the skin once a week. 4 Syringe 11    ONE TOUCH DELICA LANCETS 33 gauge Misc        No current facility-administered medications for this visit.        Past Medical History:  "  Diagnosis Date    Acid reflux     Arthritis     Cataract     Diabetes mellitus, type 2     Dry mouth     Hyperlipidemia 2014    Hypertension     KAMRAN (obstructive sleep apnea)        Past Surgical History:   Procedure Laterality Date    ankle surgery (l)      APPENDECTOMY       SECTION      DILATION AND CURETTAGE OF UTERUS      KNEE ARTHROSCOPY W/ DEBRIDEMENT      KNEE SURGERY Left 2017    TKR       Vital Signs (Most Recent)  There were no vitals filed for this visit.        Review of Systems:  ROS:  Constitutional: no fever or chills  Eyes: no visual changes  ENT: no nasal congestion or sore throat  Respiratory: no cough or shortness of breath  Cardiovascular: no chest pain or palpitations  Gastrointestinal: no nausea or vomiting, tolerating diet, Positive for GERD  Genitourinary: no hematuria or dysuria, Positive CKD stage 2  Integument/Breast: no rash or pruritis  Hematologic/Lymphatic: no easy bruising or lymphadenopathy  Musculoskeletal: no arthralgias or myalgias  Neurological: no seizures or tremors  Behavioral/Psych: no auditory or visual hallucinations  Endocrine: no heat or cold intolerance, Positive diabetes type 2                OBJECTIVE:     PHYSICAL EXAM:  Height: 5' 2" (157.5 cm) Weight: 98.6 kg (217 lb 6 oz), General Appearance: Well nourished, well developed, in no acute distress.  Neurological: Mood & affect are normal.  right  Knee Exam:  Knee Range of Motion:0-115 degrees flexion   Effusion:none  Condition of skin:intact  Location of tenderness:Medial joint line   Strength:limited by pain and 5 of 5  Stability:  Lachman: stable, LCL: stable, MCL: stable, PCL: stable and posteromedial (dial): stable  Varus /Valgus stress:  normal  Clifford:   negative/negative    left  Knee Exam:  Knee Range of Motion:0-110 degrees flexion   Effusion:none  Condition of skin:intact  Location of tenderness:None   Strength:5 of 5  Stability:  stable to testing  Varus /Valgus " stress:  normal      Hip Examination:  normal    RADIOGRAPHS:   x-rays taken today films reviewed by me demonstrate well-fixed well-aligned prosthesis in the left knee without evidence of wear loosening dislocation or other abnormality.  Right knee demonstrates moderate arthritic changes tricompartmentally without evidence of fracture dislocation early sclerotic changes can be noted    ASSESSMENT/PLAN:     Plan: We discussed with the patient at length all the different treatment options available for  the knee including anti-inflammatories, acetaminophen, rest, ice, knee strengthening exercise, occasional cortisone injections for temporary relief, Viscosupplimentation injections, arthroscopic debridement osteotomy, and finally knee arthroplasty.    proceed with therapeutic diagnostic cortisone injection of the right knee    The injection site was identified and the skin was prepared with a betadine solution. The   right  knee was injected with 1 ml of Celestone and 5 ml Lidocaine under sterile technique. Kaylee Romero tolerated the procedure well, she was advised to rest the knee today, ice and elevation. she did receive immediate relief of the pain in and about his knee she was told this would be short lived and is secondary to the lidocaine. she may have an increase in his discomfort tonight followed by steady improvement over the next several days. I may take 1-3 weeks following the injection to get the full benefit of the medication.  I will see her back in 4-6 months. Sooner if he has any problems or concerns.    Kaylee Romero was advised to monitor her blood sugars closely over the next several days. The steroid may cause a rise in them. If her glucose levels rise to a point she is uncomfortable or he is unable to control them is is to contact his PCP or go immediately to the emergency department.

## 2018-09-05 NOTE — TELEPHONE ENCOUNTER
----- Message from Deejay Rueda sent at 9/5/2018  9:32 AM CDT -----  Contact: Self/ [phone]  Same Day Appointment Request    Reason for FST appt.: Pain in right knee.   Communication Preference: Home phone [phone]        Mr Mary SRINIVASAN   had a 4 pm opening  this afternoon   She took it ... I told her to come a little early    She agreed

## 2018-10-02 ENCOUNTER — OFFICE VISIT (OUTPATIENT)
Dept: INTERNAL MEDICINE | Facility: CLINIC | Age: 73
End: 2018-10-02
Payer: MEDICARE

## 2018-10-02 VITALS
OXYGEN SATURATION: 99 % | SYSTOLIC BLOOD PRESSURE: 150 MMHG | DIASTOLIC BLOOD PRESSURE: 98 MMHG | BODY MASS INDEX: 40.6 KG/M2 | TEMPERATURE: 98 F | WEIGHT: 222 LBS | HEART RATE: 97 BPM

## 2018-10-02 DIAGNOSIS — M25.512 ACUTE PAIN OF LEFT SHOULDER: ICD-10-CM

## 2018-10-02 DIAGNOSIS — S20.02XA POSTTRAUMATIC HEMATOMA OF LEFT BREAST, INITIAL ENCOUNTER: Primary | ICD-10-CM

## 2018-10-02 PROCEDURE — 99213 OFFICE O/P EST LOW 20 MIN: CPT | Mod: S$PBB,,, | Performed by: INTERNAL MEDICINE

## 2018-10-02 PROCEDURE — 99999 PR PBB SHADOW E&M-EST. PATIENT-LVL IV: CPT | Mod: PBBFAC,,, | Performed by: INTERNAL MEDICINE

## 2018-10-02 PROCEDURE — 99214 OFFICE O/P EST MOD 30 MIN: CPT | Mod: PBBFAC | Performed by: INTERNAL MEDICINE

## 2018-10-02 RX ORDER — IBUPROFEN 800 MG/1
800 TABLET ORAL EVERY 6 HOURS PRN
COMMUNITY
End: 2018-11-08

## 2018-10-02 NOTE — PROGRESS NOTES
Subjective:       Patient ID: Kaylee Romero is a 73 y.o. female.    Chief Complaint: Fall    Patient reports she feel down 2 steps 9/21/2018  Is worried about a bruise in left breast and decreased ROM and pain in left shoulder.  No loc      Review of Systems   Constitutional: Negative for activity change, chills, fatigue and fever.   HENT: Negative for congestion, ear pain, nosebleeds, postnasal drip, sinus pressure and sore throat.    Eyes: Negative.  Negative for visual disturbance.   Respiratory: Negative for cough, chest tightness, shortness of breath and wheezing.    Cardiovascular: Negative for chest pain.   Gastrointestinal: Negative for abdominal pain, diarrhea, nausea and vomiting.   Genitourinary: Negative for difficulty urinating, dysuria, frequency and urgency.   Musculoskeletal: Negative for arthralgias and neck stiffness.   Skin: Negative for rash.   Neurological: Negative for dizziness, weakness and headaches.   Psychiatric/Behavioral: Negative for sleep disturbance. The patient is not nervous/anxious.        Objective:      Physical Exam   Constitutional: She is oriented to person, place, and time. She appears well-developed and well-nourished.  Non-toxic appearance. No distress.   HENT:   Head: Normocephalic and atraumatic.   Right Ear: Tympanic membrane, external ear and ear canal normal.   Left Ear: Tympanic membrane, external ear and ear canal normal.   Eyes: EOM are normal. Pupils are equal, round, and reactive to light. No scleral icterus.   Neck: Normal range of motion. Neck supple. No thyromegaly present.   Cardiovascular: Normal rate, regular rhythm and normal heart sounds.   Pulmonary/Chest: Effort normal and breath sounds normal.       Abdominal: Soft. Bowel sounds are normal. She exhibits no mass. There is no tenderness. There is no rebound.   Musculoskeletal:        Left shoulder: She exhibits decreased range of motion, tenderness, pain and decreased strength. She exhibits no bony  tenderness, no swelling, no effusion, no crepitus, no deformity, no spasm and normal pulse.   Lymphadenopathy:     She has no cervical adenopathy.   Neurological: She is alert and oriented to person, place, and time. She has normal reflexes. She displays normal reflexes. No cranial nerve deficit. She exhibits normal muscle tone. Coordination normal.   Skin: Skin is warm and dry.   Psychiatric: She has a normal mood and affect. Her behavior is normal.       Assessment:       1. Posttraumatic hematoma of left breast, initial encounter    2. Acute pain of left shoulder        Plan:   Kaylee was seen today for fall.    Diagnoses and all orders for this visit:    Posttraumatic hematoma of left breast, initial encounter    Acute pain of left shoulder  -     X-Ray Shoulder Trauma 3 view Left; Future  -     Ambulatory consult to Orthopedics

## 2018-10-03 ENCOUNTER — TELEPHONE (OUTPATIENT)
Dept: INTERNAL MEDICINE | Facility: CLINIC | Age: 73
End: 2018-10-03

## 2018-10-03 ENCOUNTER — TELEPHONE (OUTPATIENT)
Dept: FAMILY MEDICINE | Facility: CLINIC | Age: 73
End: 2018-10-03

## 2018-10-03 ENCOUNTER — PATIENT MESSAGE (OUTPATIENT)
Dept: FAMILY MEDICINE | Facility: CLINIC | Age: 73
End: 2018-10-03

## 2018-10-03 NOTE — TELEPHONE ENCOUNTER
----- Message from Ginger Frias sent at 10/3/2018 11:46 AM CDT -----  Contact: Pt  Please send a generic brand of Trulicity due to no Insurance coverage.

## 2018-10-03 NOTE — TELEPHONE ENCOUNTER
----- Message from Wanda Sadler sent at 10/3/2018  8:24 AM CDT -----  Contact: Patient [phone]  Pt is calling to speak to someone in regards to her X-Ray results. Pt states that she was unable to come back on yesterday due to an extraction that she had. Pt would like to know if she can come back in some time tomorrow. Please call back and advise.      Thanks

## 2018-10-04 ENCOUNTER — TELEPHONE (OUTPATIENT)
Dept: PHARMACY | Facility: CLINIC | Age: 73
End: 2018-10-04

## 2018-10-04 ENCOUNTER — HOSPITAL ENCOUNTER (OUTPATIENT)
Dept: RADIOLOGY | Facility: HOSPITAL | Age: 73
Discharge: HOME | End: 2018-10-04
Attending: INTERNAL MEDICINE
Payer: MEDICARE

## 2018-10-04 DIAGNOSIS — M25.512 ACUTE PAIN OF LEFT SHOULDER: ICD-10-CM

## 2018-10-04 PROCEDURE — 73030 X-RAY EXAM OF SHOULDER: CPT | Mod: 26,LT,, | Performed by: RADIOLOGY

## 2018-10-04 PROCEDURE — 73030 X-RAY EXAM OF SHOULDER: CPT | Mod: TC,LT

## 2018-10-05 ENCOUNTER — TELEPHONE (OUTPATIENT)
Dept: PHARMACY | Facility: CLINIC | Age: 73
End: 2018-10-05

## 2018-10-05 NOTE — TELEPHONE ENCOUNTER
Provided patient with a free 1 month trial of Trulicity. Waiting for patient to bring in Social Security Award letter to submit application to Pharmacy patient assistance.

## 2018-10-15 ENCOUNTER — TELEPHONE (OUTPATIENT)
Dept: ORTHOPEDICS | Facility: CLINIC | Age: 73
End: 2018-10-15

## 2018-10-15 NOTE — TELEPHONE ENCOUNTER
Regarding appointment 11/01/18; need to know more about C/O.TIARA Israel PA-C is a trauma PA.  New appointment at [facility], [place] [place] by [facility] with. FREDRICK Armando on 10/23/18 at 2:45 pm/mailed.

## 2018-10-22 DIAGNOSIS — Z12.31 ENCOUNTER FOR SCREENING MAMMOGRAM FOR MALIGNANT NEOPLASM OF BREAST: Primary | ICD-10-CM

## 2018-10-23 ENCOUNTER — DOCUMENTATION ONLY (OUTPATIENT)
Dept: ORTHOPEDICS | Facility: CLINIC | Age: 73
End: 2018-10-23

## 2018-10-23 ENCOUNTER — OFFICE VISIT (OUTPATIENT)
Dept: ORTHOPEDICS | Facility: CLINIC | Age: 73
End: 2018-10-23
Payer: MEDICARE

## 2018-10-23 VITALS
SYSTOLIC BLOOD PRESSURE: 200 MMHG | WEIGHT: 222 LBS | HEIGHT: 62 IN | BODY MASS INDEX: 40.85 KG/M2 | DIASTOLIC BLOOD PRESSURE: 102 MMHG | HEART RATE: 92 BPM

## 2018-10-23 DIAGNOSIS — M75.42 SHOULDER IMPINGEMENT SYNDROME, LEFT: Primary | ICD-10-CM

## 2018-10-23 PROCEDURE — 99203 OFFICE O/P NEW LOW 30 MIN: CPT | Mod: 25,S$PBB,, | Performed by: PHYSICIAN ASSISTANT

## 2018-10-23 PROCEDURE — 99215 OFFICE O/P EST HI 40 MIN: CPT | Mod: PBBFAC | Performed by: PHYSICIAN ASSISTANT

## 2018-10-23 PROCEDURE — 20610 DRAIN/INJ JOINT/BURSA W/O US: CPT | Mod: PBBFAC | Performed by: PHYSICIAN ASSISTANT

## 2018-10-23 PROCEDURE — 20610 DRAIN/INJ JOINT/BURSA W/O US: CPT | Mod: S$PBB,LT,, | Performed by: PHYSICIAN ASSISTANT

## 2018-10-23 PROCEDURE — 99999 PR PBB SHADOW E&M-EST. PATIENT-LVL V: CPT | Mod: PBBFAC,,, | Performed by: PHYSICIAN ASSISTANT

## 2018-10-23 RX ORDER — TRIAMCINOLONE ACETONIDE 40 MG/ML
80 INJECTION, SUSPENSION INTRA-ARTICULAR; INTRAMUSCULAR
Status: COMPLETED | OUTPATIENT
Start: 2018-10-23 | End: 2018-10-23

## 2018-10-23 RX ORDER — BUPIVACAINE HYDROCHLORIDE 2.5 MG/ML
4 INJECTION, SOLUTION INFILTRATION; PERINEURAL
Status: COMPLETED | OUTPATIENT
Start: 2018-10-23 | End: 2018-10-23

## 2018-10-23 RX ADMIN — BUPIVACAINE HYDROCHLORIDE 10 MG: 2.5 INJECTION, SOLUTION INFILTRATION; PERINEURAL at 03:10

## 2018-10-23 RX ADMIN — TRIAMCINOLONE ACETONIDE 80 MG: 40 INJECTION, SUSPENSION INTRA-ARTICULAR; INTRAMUSCULAR at 03:10

## 2018-10-23 NOTE — PROGRESS NOTES
Subjective:      Patient ID: Kaylee Romero is a 73 y.o. female.    Chief Complaint: Pain of the Left Shoulder      HPI  Kaylee Romero is a right hand dominant 73 y.o. female presenting today for left shoulder pain.  There was a history of trauma.  Onset of symptoms began 9/21/18 when she fell down the steps and landed on the left shoulder and chest.  She reports that she is experiencing severe pain in the left shoulder, it is not improved with Tylenol.  She reports that it is affecting her shoulder range of motion.  She says that she tries to keep the shoulder moving, but does have pain with motion.      Her blood pressure is extremely elevated today, she states that she is not taking her blood pressure medication because it is not effective.  She reports that she occasionally monitors her blood pressure, she has not followed up with her primary care, she is not worried about it.         Review of patient's allergies indicates:   Allergen Reactions    Keflex [cephalexin] Other (See Comments)     Turns orange    Bactrim [sulfamethoxazole-trimethoprim]      Generic version  Sulfa makes her sick         Current Outpatient Medications   Medication Sig Dispense Refill    dulaglutide (TRULICITY) 0.75 mg/0.5 mL PnIj Inject into the skin.      (Magic mouthwash) 1:1:1 Benadryl 12.5mg/5ml liq, aluminum & magnesium hydroxide-simehticone (Maalox), lidocaine viscous 2% Swish and spit 5 mLs every 4 (four) hours as needed. for mouth sores 180 mL 0    ACCU-CHEK FASTCLIX [facility]       alcohol swabs (ALCOHOL PADS) PadM Apply 1 each topically as needed. 11 each 11    blood sugar diagnostic (ONETOUCH VERIO) Strp 1 strip by Misc.(Non-Drug; Combo Route) route 2 (two) times daily. 50 strip 11    cyclobenzaprine (FLEXERIL) 10 MG tablet Take 10 mg by mouth 2 (two) times daily as needed for Muscle spasms. Disp. # 60  No refills      DIPHENHYDRAMINE HCL (WAL-DRYL ALLERGY ORAL) Take by mouth as needed (jong).       "dulaglutide 1.5 mg/0.5 mL PnIj Inject 1.5 mg into the skin once a week. 4 Syringe 11    ibuprofen (ADVIL,MOTRIN) 800 MG tablet Take 800 mg by mouth every 6 (six) hours as needed for Pain.      ONE TOUCH DELICA LANCETS 33 gauge Misc        Current Facility-Administered Medications   Medication Dose Route Frequency Provider Last Rate Last Dose    bupivacaine 0.25% (2.5 mg/ml) injection 10 mg  4 mL INTRABURSAL 1 time in Clinic/HOD FREDRICK Juares        triamcinolone acetonide injection 80 mg  80 mg INTRABURSAL 1 time in Clinic/HOD FREDRICK Juares           Past Medical History:   Diagnosis Date    Acid reflux     Arthritis     Cataract     Diabetes mellitus, type 2     Dry mouth     Hyperlipidemia 2014    Hypertension     KAMRAN (obstructive sleep apnea)        Past Surgical History:   Procedure Laterality Date    ankle surgery (l)      APPENDECTOMY       SECTION      DILATION AND CURETTAGE OF UTERUS      KNEE ARTHROSCOPY W/ DEBRIDEMENT      KNEE SURGERY Left 2017    TKR    REPLACEMENT-KNEE-TOTAL Left 2017    Performed by [facility] MD at [facility] OR [address] [place] [address]         Review of Systems:  Review of Systems   Constitution: Negative for chills and fever.   Skin: Negative for rash and suspicious lesions.   Musculoskeletal:        See HPI   Neurological: Negative for dizziness, headaches, light-headedness, numbness and paresthesias.   Psychiatric/Behavioral: Negative for depression. The patient is not nervous/anxious.          OBJECTIVE:     PHYSICAL EXAM:  Height: 5' 2" (157.5 cm) Weight: 100.7 kg (222 lb)  Vitals:    10/23/18 1508   BP: (!) 183/126   Pulse: 101   Weight: 100.7 kg (222 lb)   Height: 5' 2" (1.575 m)   PainSc:   3     General    Vitals reviewed.  Constitutional: She is oriented to person, place, and time. She appears well-developed and well-nourished.   HENT:   Head: Normocephalic and atraumatic.   Neck: Normal range of motion.   Cardiovascular: Normal " rate.    Pulmonary/Chest: Effort normal. No respiratory distress.   Neurological: She is alert and oriented to person, place, and time.   Psychiatric: She has a normal mood and affect. Her behavior is normal. Judgment and thought content normal.             Musculoskeletal:  No scars or edema appreciated.  No ecchymosis.  She is tender to palpation over the lateral and posterior aspect of the left shoulder.  Otherwise nontender.  She has decreased left shoulder range of motion, also has pain with left shoulder motion. Significant decrease in left shoulder internal rotation, cannot make it to the level of the sacrum.  Decreased ER, forward flexion and abduction to approximately 120°.  Positive impingement sign on the left.  No weakness. Neurovascularly intact-good sensation and motor function, good capillary refill, 2+ radial pulses.    RADIOGRAPHS:  Left Shoulder XRay, 10/4/18  FINDINGS:  Mild degenerative changes at the acromioclavicular joint can be seen.  No soft tissue calcification is identified.  Bony structures are intact.      Impression     See above     Comments: I have personally reviewed the imaging and I agree with the above radiologist's report.    ASSESSMENT/PLAN:   Kaylee was seen today for pain.    Diagnoses and all orders for this visit:    Shoulder impingement syndrome, left  -     Ambulatory Referral to Physical/Occupational Therapy    Other orders  -     triamcinolone acetonide injection 80 mg  -     bupivacaine 0.25% (2.5 mg/ml) injection 10 mg           - We talked at length about the anatomy and pathophysiology of   Encounter Diagnosis   Name Primary?    Shoulder impingement syndrome, left Yes       - discussed patient's symptoms and physical exam findings, discussed shoulder impingement and pain. Discussed treatment options including NSAIDs, heat/ice, compound cream, steroid injection, therapy, and surgery.  - orders placed for physical therapy  - steroid injection today  - follow-up in 8-12  weeks  - call with questions or concerns  - recommend follow-up with primary care, patient should discuss her elevated blood pressure and lack of compliance with hypertension medication.  Discussed with the patient going to ED for hypertension management, patient refused.    PROCEDURE NOTE:  I have explained the risks, benefits, and alternatives of the procedure in detail.  The patient voices understanding and all questions have been answered.  The patient agrees to proceed as planned, consents to injection. Pause for timeout. After a sterile prep of the skin performed in the normal fashion, the left shoulder is injected from the posterior approach using a 22 gauge needle with a combination of 4 cc 0.25% marcaine and 80 mg of kenalog. The patient is cautioned and immediate relief of pain is secondary to the local anesthetic and will be temporary.  After the anesthetic wears off there may be a increase in pain that may last for a few hours or a few days and they should use ice to help alleviate this flair up of pain.       Disclaimer: This note has been generated using voice-recognition software. There may be typographical errors that have been missed during proof-reading.

## 2018-10-23 NOTE — PROGRESS NOTES
Pt blood pressure was taken for a third time at 3:58pm upon discharge and read 185/113.  Pt stated her blood pressure runs high but she felt fine.  Pt stated she had to be somewhere to do a talk.

## 2018-11-02 ENCOUNTER — TELEPHONE (OUTPATIENT)
Dept: CARDIOLOGY | Facility: CLINIC | Age: 73
End: 2018-11-02

## 2018-11-02 DIAGNOSIS — R00.2 PALPITATIONS: Primary | ICD-10-CM

## 2018-11-02 DIAGNOSIS — E11.9 TYPE 2 DIABETES MELLITUS WITHOUT COMPLICATION: ICD-10-CM

## 2018-11-05 ENCOUNTER — OFFICE VISIT (OUTPATIENT)
Dept: CARDIOLOGY | Facility: CLINIC | Age: 73
End: 2018-11-05
Payer: MEDICARE

## 2018-11-05 ENCOUNTER — HOSPITAL ENCOUNTER (OUTPATIENT)
Dept: CARDIOLOGY | Facility: CLINIC | Age: 73
Discharge: HOME | End: 2018-11-05
Payer: MEDICARE

## 2018-11-05 VITALS
SYSTOLIC BLOOD PRESSURE: 211 MMHG | BODY MASS INDEX: 37.38 KG/M2 | HEART RATE: 90 BPM | WEIGHT: 218.94 LBS | DIASTOLIC BLOOD PRESSURE: 108 MMHG | HEIGHT: 64 IN | OXYGEN SATURATION: 99 %

## 2018-11-05 DIAGNOSIS — R06.09 DOE (DYSPNEA ON EXERTION): ICD-10-CM

## 2018-11-05 DIAGNOSIS — G45.9 TIA (TRANSIENT ISCHEMIC ATTACK): ICD-10-CM

## 2018-11-05 DIAGNOSIS — R00.2 PALPITATIONS: ICD-10-CM

## 2018-11-05 DIAGNOSIS — E66.09 CLASS 2 OBESITY DUE TO EXCESS CALORIES WITHOUT SERIOUS COMORBIDITY WITH BODY MASS INDEX (BMI) OF 38.0 TO 38.9 IN ADULT: ICD-10-CM

## 2018-11-05 DIAGNOSIS — R07.9 CHEST PAIN, UNSPECIFIED TYPE: Primary | ICD-10-CM

## 2018-11-05 DIAGNOSIS — R53.81 PHYSICAL DECONDITIONING: ICD-10-CM

## 2018-11-05 PROCEDURE — 99214 OFFICE O/P EST MOD 30 MIN: CPT | Mod: PBBFAC,25 | Performed by: INTERNAL MEDICINE

## 2018-11-05 PROCEDURE — 99204 OFFICE O/P NEW MOD 45 MIN: CPT | Mod: S$GLB,,, | Performed by: INTERNAL MEDICINE

## 2018-11-05 PROCEDURE — 93000 ELECTROCARDIOGRAM COMPLETE: CPT | Mod: S$GLB,,, | Performed by: INTERNAL MEDICINE

## 2018-11-05 PROCEDURE — 99999 PR PBB SHADOW E&M-EST. PATIENT-LVL IV: CPT | Mod: PBBFAC,,, | Performed by: INTERNAL MEDICINE

## 2018-11-05 PROCEDURE — 93005 ELECTROCARDIOGRAM TRACING: CPT | Mod: PBBFAC | Performed by: INTERNAL MEDICINE

## 2018-11-05 RX ORDER — ASPIRIN 81 MG/1
81 TABLET ORAL DAILY
Qty: 1 TABLET | Refills: 0 | Status: ON HOLD
Start: 2018-11-05 | End: 2019-08-21 | Stop reason: HOSPADM

## 2018-11-05 NOTE — PROGRESS NOTES
"Subjective:    Patient ID:  Kaylee Romero is a 73 y.o. female who presents for evaluation of chest heaviness    HPI   The patient is a 73 year old female presents for evaluation of chest heaviness. She has had 4 episodes over the past 2 months lasting a few minutes, one as severe as a 8/10. She has GRANADO but does "very little" exercise. She reports and confirmed by her  and episode of confusion and dysarthria lasting 1/2 hours. She also noted difficulty typing on her computer with both hands and this time. Her father had a heart rhythm problem all his life. Her mother  of a heart attach at 87. 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 EKG revealed non specific T wave abnormalities.  She has had a few slip and falls but no syncope.     CONCLUSIONS     1 - Normal left ventricular function (EF 63%).     2 - Diastolic dysfunction.     3 - Mild left atrial enlargement.     No evidence of stress induced myocardial ischemia by ECG or echo despite the severe symptoms reported.     This document has been electronically    SIGNED BY: Rolf Desai M.D. On: 2013 15:  Lab Results   Component Value Date     2018    K 4.8 2018     2018    CO2 26 2018    BUN 15 2018    CREATININE 1.1 2018     (H) 2018    HGBA1C 7.3 (H) 2018    MG 2.0 2015    AST 16 2018    ALT 15 2018    ALBUMIN 3.3 (L) 2018    PROT 7.9 2018    BILITOT 0.2 2018    WBC 4.31 2018    HGB 11.7 (L) 2018    HCT 36.6 (L) 2018    MCV 83 2018     2018    INR 0.9 2017    TSH 2.776 2018         Lab Results   Component Value Date    CHOL 165 2018    HDL 44 2018    TRIG 73 2018       Lab Results   Component Value Date    LDLCALC 106.4 2018       Past Medical History:   Diagnosis Date    Acid reflux     Arthritis     Cataract     Diabetes mellitus, type 2     Dry " mouth     Hyperlipidemia 12/2/2014    Hypertension     KAMRAN (obstructive sleep apnea)        Current Outpatient Medications:     (Magic mouthwash) 1:1:1 Benadryl 12.5mg/5ml liq, aluminum & magnesium hydroxide-simehticone (Maalox), lidocaine viscous 2%, Swish and spit 5 mLs every 4 (four) hours as needed. for mouth sores, Disp: 180 mL, Rfl: 0    ACCU-CHEK FASTCLIX Misc, , Disp: , Rfl:     alcohol swabs (ALCOHOL PADS) PadM, Apply 1 each topically as needed., Disp: 11 each, Rfl: 11    blood sugar diagnostic (ONETOUCH VERIO) Strp, 1 strip by Misc.(Non-Drug; Combo Route) route 2 (two) times daily., Disp: 50 strip, Rfl: 11    cyclobenzaprine (FLEXERIL) 10 MG tablet, Take 10 mg by mouth 2 (two) times daily as needed for Muscle spasms. Disp. # 60  No refills, Disp: , Rfl:     DIPHENHYDRAMINE HCL (WAL-DRYL ALLERGY ORAL), Take by mouth as needed (alergies)., Disp: , Rfl:     dulaglutide 1.5 mg/0.5 mL PnIj, Inject 1.5 mg into the skin once a week., Disp: 4 Syringe, Rfl: 11    ibuprofen (ADVIL,MOTRIN) 800 MG tablet, Take 800 mg by mouth every 6 (six) hours as needed for Pain., Disp: , Rfl:     ONE TOUCH DELICA LANCETS 33 gauge Misc, , Disp: , Rfl:     aspirin (ECOTRIN) 81 MG EC tablet, Take 1 tablet (81 mg total) by mouth once daily., Disp: 1 tablet, Rfl: 0          Review of Systems   Constitution: Negative for decreased appetite, diaphoresis, fever, weakness, malaise/fatigue, weight gain and weight loss.   HENT: Negative for congestion, ear discharge, ear pain and nosebleeds.    Eyes: Negative for blurred vision, double vision and visual disturbance.   Cardiovascular: Positive for dyspnea on exertion. Negative for chest pain, claudication, cyanosis, irregular heartbeat, leg swelling, near-syncope, orthopnea, palpitations, paroxysmal nocturnal dyspnea and syncope.   Respiratory: Positive for shortness of breath. Negative for cough, hemoptysis, sleep disturbances due to breathing, snoring, sputum production and  "wheezing.    Endocrine: Negative for polydipsia, polyphagia and polyuria.   Hematologic/Lymphatic: Negative for adenopathy and bleeding problem. Does not bruise/bleed easily.   Skin: Negative for color change, nail changes, poor wound healing and rash.   Musculoskeletal: Negative for muscle cramps and muscle weakness.   Gastrointestinal: Negative for abdominal pain, anorexia, change in bowel habit, hematochezia, nausea and vomiting.   Genitourinary: Negative for dysuria, frequency and hematuria.   Neurological: Positive for aphonia, brief paralysis, difficulty with concentration and disturbances in coordination. Negative for excessive daytime sleepiness, dizziness, focal weakness, headaches, light-headedness, seizures and vertigo.   Psychiatric/Behavioral: Positive for altered mental status. Negative for depression.   Allergic/Immunologic: Negative for persistent infections.        Objective:BP (!) 211/108 (BP Location: Left arm, Patient Position: Sitting, BP Method: Large (Automatic))   Pulse 90   Ht 5' 3.5" (1.613 m)   Wt 99.3 kg (218 lb 14.7 oz)   SpO2 99%   BMI 38.17 kg/m²             Physical Exam   Constitutional: She is oriented to person, place, and time. She appears well-developed and well-nourished.   obese   HENT:   Head: Normocephalic.   Right Ear: External ear normal.   Left Ear: External ear normal.   Nose: Nose normal.   Inspection of lips, teeth and gums normal   Eyes: Conjunctivae and EOM are normal. Pupils are equal, round, and reactive to light. No scleral icterus.   Neck: Normal range of motion. No JVD present. No tracheal deviation present. No thyromegaly present.   Cardiovascular: Normal rate, regular rhythm, normal heart sounds and intact distal pulses. Exam reveals no gallop and no friction rub.   No murmur heard.  Pulses:       Dorsalis pedis pulses are 2+ on the right side, and 2+ on the left side.        Posterior tibial pulses are 2+ on the right side, and 2+ on the left side. "   Pulmonary/Chest: Effort normal and breath sounds normal. No respiratory distress. She has no wheezes. She has no rales. She exhibits no tenderness.   Abdominal: Soft. Bowel sounds are normal. She exhibits no distension. There is no hepatosplenomegaly. There is no tenderness. There is no guarding.   Musculoskeletal: Normal range of motion. She exhibits no edema or tenderness.   Lymphadenopathy:   Palpation of lymph nodes of neck and groin normal   Neurological: She is oriented to person, place, and time. No cranial nerve deficit. She exhibits normal muscle tone. Coordination normal.   Skin: Skin is warm and dry. No rash noted. No erythema. No pallor.   Palpation of skin normal   Psychiatric: She has a normal mood and affect. Her behavior is normal. Judgment and thought content normal.         Assessment:       1. Chest pain, unspecified type    2. GRANADO (dyspnea on exertion)    3. Class 2 obesity due to excess calories without serious comorbidity with body mass index (BMI) of 38.0 to 38.9 in adult    4. Physical deconditioning    5. TIA (transient ischemic attack)         Plan:       Kaylee was seen today for chest pain and shortness of breath.    Diagnoses and all orders for this visit:    Chest pain, unspecified type  -     aspirin (ECOTRIN) 81 MG EC tablet; Take 1 tablet (81 mg total) by mouth once daily.  -     EKG 12-LEAD; Future  -     NM Multi Pharm Stress Cardiac Component; Future    GRANADO (dyspnea on exertion)    Class 2 obesity due to excess calories without serious comorbidity with body mass index (BMI) of 38.0 to 38.9 in adult    Physical deconditioning    TIA (transient ischemic attack)  -     aspirin (ECOTRIN) 81 MG EC tablet; Take 1 tablet (81 mg total) by mouth once daily.  -     Ambulatory consult to Neurology  -     VAS US Carotid Bilateral; Future

## 2018-11-08 ENCOUNTER — LAB VISIT (OUTPATIENT)
Dept: LAB | Facility: HOSPITAL | Age: 73
End: 2018-11-08
Attending: INTERNAL MEDICINE
Payer: MEDICARE

## 2018-11-08 ENCOUNTER — CLINICAL SUPPORT (OUTPATIENT)
Dept: CARDIOLOGY | Facility: CLINIC | Age: 73
End: 2018-11-08
Attending: INTERNAL MEDICINE
Payer: MEDICARE

## 2018-11-08 ENCOUNTER — OFFICE VISIT (OUTPATIENT)
Dept: INTERNAL MEDICINE | Facility: CLINIC | Age: 73
End: 2018-11-08
Payer: MEDICARE

## 2018-11-08 VITALS
WEIGHT: 220.88 LBS | HEIGHT: 64 IN | SYSTOLIC BLOOD PRESSURE: 168 MMHG | HEART RATE: 84 BPM | DIASTOLIC BLOOD PRESSURE: 92 MMHG | BODY MASS INDEX: 37.71 KG/M2

## 2018-11-08 DIAGNOSIS — N18.30 TYPE 2 DIABETES MELLITUS WITH STAGE 3 CHRONIC KIDNEY DISEASE, WITHOUT LONG-TERM CURRENT USE OF INSULIN: ICD-10-CM

## 2018-11-08 DIAGNOSIS — M81.0 OSTEOPOROSIS WITHOUT CURRENT PATHOLOGICAL FRACTURE, UNSPECIFIED OSTEOPOROSIS TYPE: ICD-10-CM

## 2018-11-08 DIAGNOSIS — I50.30 HYPERTENSIVE HEART DISEASE WITH DIASTOLIC HEART FAILURE: Primary | ICD-10-CM

## 2018-11-08 DIAGNOSIS — E11.22 TYPE 2 DIABETES MELLITUS WITH STAGE 3 CHRONIC KIDNEY DISEASE, WITHOUT LONG-TERM CURRENT USE OF INSULIN: ICD-10-CM

## 2018-11-08 DIAGNOSIS — Z91.148 NONCOMPLIANCE WITH MEDICATION REGIMEN: ICD-10-CM

## 2018-11-08 DIAGNOSIS — Z86.39 HISTORY OF NON ANEMIC VITAMIN B12 DEFICIENCY: ICD-10-CM

## 2018-11-08 DIAGNOSIS — I11.0 HYPERTENSIVE HEART DISEASE WITH DIASTOLIC HEART FAILURE: ICD-10-CM

## 2018-11-08 DIAGNOSIS — G47.33 OSA ON CPAP: ICD-10-CM

## 2018-11-08 DIAGNOSIS — D64.9 NORMOCYTIC ANEMIA: ICD-10-CM

## 2018-11-08 DIAGNOSIS — I50.30 HYPERTENSIVE HEART DISEASE WITH DIASTOLIC HEART FAILURE: ICD-10-CM

## 2018-11-08 DIAGNOSIS — E66.01 SEVERE OBESITY (BMI 35.0-39.9) WITH COMORBIDITY: ICD-10-CM

## 2018-11-08 DIAGNOSIS — Z12.11 COLON CANCER SCREENING: ICD-10-CM

## 2018-11-08 DIAGNOSIS — M47.816 LUMBAR FACET ARTHROPATHY: ICD-10-CM

## 2018-11-08 DIAGNOSIS — I11.0 HYPERTENSIVE HEART DISEASE WITH DIASTOLIC HEART FAILURE: Primary | ICD-10-CM

## 2018-11-08 DIAGNOSIS — R07.9 CHEST PAIN, UNSPECIFIED TYPE: Primary | ICD-10-CM

## 2018-11-08 DIAGNOSIS — G45.9 TIA (TRANSIENT ISCHEMIC ATTACK): ICD-10-CM

## 2018-11-08 DIAGNOSIS — Z12.31 ENCOUNTER FOR SCREENING MAMMOGRAM FOR BREAST CANCER: ICD-10-CM

## 2018-11-08 LAB
25(OH)D3+25(OH)D2 SERPL-MCNC: 15 NG/ML
ALBUMIN SERPL BCP-MCNC: 3.1 G/DL
ALP SERPL-CCNC: 136 U/L
ALT SERPL W/O P-5'-P-CCNC: 18 U/L
ANION GAP SERPL CALC-SCNC: 9 MMOL/L
AST SERPL-CCNC: 15 U/L
BASOPHILS # BLD AUTO: 0.04 K/UL
BASOPHILS NFR BLD: 0.5 %
BILIRUB SERPL-MCNC: 0.3 MG/DL
BUN SERPL-MCNC: 19 MG/DL
CALCIUM SERPL-MCNC: 9.3 MG/DL
CHLORIDE SERPL-SCNC: 101 MMOL/L
CO2 SERPL-SCNC: 25 MMOL/L
CREAT SERPL-MCNC: 1.3 MG/DL
DIFFERENTIAL METHOD: ABNORMAL
EOSINOPHIL # BLD AUTO: 0 K/UL
EOSINOPHIL NFR BLD: 0.5 %
ERYTHROCYTE [DISTWIDTH] IN BLOOD BY AUTOMATED COUNT: 14.4 %
EST. GFR  (AFRICAN AMERICAN): 47.7 ML/MIN/1.73 M^2
EST. GFR  (NON AFRICAN AMERICAN): 41.4 ML/MIN/1.73 M^2
ESTIMATED AVG GLUCOSE: 235 MG/DL
FERRITIN SERPL-MCNC: 102 NG/ML
FOLATE SERPL-MCNC: 10 NG/ML
GLUCOSE SERPL-MCNC: 310 MG/DL
HBA1C MFR BLD HPLC: 9.8 %
HCT VFR BLD AUTO: 40.4 %
HGB BLD-MCNC: 13.4 G/DL
IMM GRANULOCYTES # BLD AUTO: 0.06 K/UL
IMM GRANULOCYTES NFR BLD AUTO: 0.8 %
IRON SERPL-MCNC: 73 UG/DL
LEFT ARM SYSTOLIC BLOOD PRESSURE: 166 MMHG
LEFT CBA DIAS: 14 CM/S
LEFT CBA SYS: 57 CM/S
LEFT CCA DIST DIAS: 15 CM/S
LEFT CCA DIST SYS: 74 CM/S
LEFT CCA MID DIAS: 15 CM/S
LEFT CCA MID SYS: 74 CM/S
LEFT CCA PROX DIAS: 12 CM/S
LEFT CCA PROX SYS: 88 CM/S
LEFT ECA DIAS: 0 CM/S
LEFT ECA SYS: 44 CM/S
LEFT ICA DIST DIAS: 26 CM/S
LEFT ICA DIST SYS: 84 CM/S
LEFT ICA MID DIAS: 15 CM/S
LEFT ICA MID SYS: 64 CM/S
LEFT ICA PROX DIAS: 12 CM/S
LEFT ICA PROX SYS: 46 CM/S
LEFT VERTEBRAL DIAS: 8 CM/S
LEFT VERTEBRAL SYS: 48 CM/S
LYMPHOCYTES # BLD AUTO: 1.6 K/UL
LYMPHOCYTES NFR BLD: 19.9 %
MCH RBC QN AUTO: 27.3 PG
MCHC RBC AUTO-ENTMCNC: 33.2 G/DL
MCV RBC AUTO: 82 FL
MONOCYTES # BLD AUTO: 0.5 K/UL
MONOCYTES NFR BLD: 6.1 %
NEUTROPHILS # BLD AUTO: 5.7 K/UL
NEUTROPHILS NFR BLD: 72.2 %
NRBC BLD-RTO: 0 /100 WBC
OHS CV CAROTID ULTRASOUND LEFT ICA/CCA RATIO: 0.95
OHS CV CAROTID ULTRASOUND RIGHT ICA/CCA RATIO: 0.97
PLATELET # BLD AUTO: 166 K/UL
PMV BLD AUTO: 11.2 FL
POTASSIUM SERPL-SCNC: 4.1 MMOL/L
PROT SERPL-MCNC: 7.8 G/DL
RBC # BLD AUTO: 4.91 M/UL
RIGHT ARM SYSTOLIC BLOOD PRESSURE: 168 MMHG
RIGHT CBA DIAS: 13 CM/S
RIGHT CBA SYS: 80 CM/S
RIGHT CCA DIST DIAS: 13 CM/S
RIGHT CCA DIST SYS: 66 CM/S
RIGHT CCA MID DIAS: 10 CM/S
RIGHT CCA MID SYS: 76 CM/S
RIGHT CCA PROX DIAS: 11 CM/S
RIGHT CCA PROX SYS: 87 CM/S
RIGHT ECA DIAS: 8 CM/S
RIGHT ECA SYS: 64 CM/S
RIGHT ICA DIST DIAS: 21 CM/S
RIGHT ICA DIST SYS: 84 CM/S
RIGHT ICA MID DIAS: 17 CM/S
RIGHT ICA MID SYS: 74 CM/S
RIGHT ICA PROX DIAS: 10 CM/S
RIGHT ICA PROX SYS: 48 CM/S
RIGHT VERTEBRAL DIAS: 12 CM/S
RIGHT VERTEBRAL SYS: 65 CM/S
SATURATED IRON: 20 %
SODIUM SERPL-SCNC: 135 MMOL/L
TOTAL IRON BINDING CAPACITY: 369 UG/DL
TRANSFERRIN SERPL-MCNC: 249 MG/DL
VIT B12 SERPL-MCNC: >2000 PG/ML
WBC # BLD AUTO: 7.89 K/UL

## 2018-11-08 PROCEDURE — 82607 VITAMIN B-12: CPT

## 2018-11-08 PROCEDURE — 99499 UNLISTED E&M SERVICE: CPT | Mod: S$PBB,,, | Performed by: INTERNAL MEDICINE

## 2018-11-08 PROCEDURE — 99999 PR PBB SHADOW E&M-EST. PATIENT-LVL III: CPT | Mod: PBBFAC,,, | Performed by: INTERNAL MEDICINE

## 2018-11-08 PROCEDURE — 85025 COMPLETE CBC W/AUTO DIFF WBC: CPT

## 2018-11-08 PROCEDURE — 80053 COMPREHEN METABOLIC PANEL: CPT

## 2018-11-08 PROCEDURE — 99215 OFFICE O/P EST HI 40 MIN: CPT | Mod: S$GLB,,, | Performed by: INTERNAL MEDICINE

## 2018-11-08 PROCEDURE — 82306 VITAMIN D 25 HYDROXY: CPT

## 2018-11-08 PROCEDURE — 82746 ASSAY OF FOLIC ACID SERUM: CPT

## 2018-11-08 PROCEDURE — 36415 COLL VENOUS BLD VENIPUNCTURE: CPT

## 2018-11-08 PROCEDURE — 82728 ASSAY OF FERRITIN: CPT

## 2018-11-08 PROCEDURE — 93880 EXTRACRANIAL BILAT STUDY: CPT | Mod: 50

## 2018-11-08 PROCEDURE — 83540 ASSAY OF IRON: CPT

## 2018-11-08 PROCEDURE — 83036 HEMOGLOBIN GLYCOSYLATED A1C: CPT

## 2018-11-08 PROCEDURE — 99213 OFFICE O/P EST LOW 20 MIN: CPT | Mod: PBBFAC | Performed by: INTERNAL MEDICINE

## 2018-11-08 PROCEDURE — 93880 EXTRACRANIAL BILAT STUDY: CPT | Mod: 26,S$GLB,, | Performed by: INTERNAL MEDICINE

## 2018-11-08 RX ORDER — DEXTROAMPHETAMINE SACCHARATE, AMPHETAMINE ASPARTATE, DEXTROAMPHETAMINE SULFATE AND AMPHETAMINE SULFATE 2.5; 2.5; 2.5; 2.5 MG/1; MG/1; MG/1; MG/1
TABLET ORAL
Refills: 0 | COMMUNITY
Start: 2018-11-03 | End: 2018-11-08 | Stop reason: HOSPADM

## 2018-11-08 RX ORDER — HYDROCHLOROTHIAZIDE 12.5 MG/1
12.5 CAPSULE ORAL DAILY
COMMUNITY
End: 2018-11-11 | Stop reason: SDUPTHER

## 2018-11-08 RX ORDER — VALSARTAN 320 MG/1
320 TABLET ORAL DAILY
COMMUNITY
End: 2018-11-11 | Stop reason: SDUPTHER

## 2018-11-09 ENCOUNTER — CLINICAL SUPPORT (OUTPATIENT)
Dept: REHABILITATION | Facility: HOSPITAL | Age: 73
End: 2018-11-09
Payer: MEDICARE

## 2018-11-09 ENCOUNTER — TELEPHONE (OUTPATIENT)
Dept: CARDIOLOGY | Facility: CLINIC | Age: 73
End: 2018-11-09

## 2018-11-09 DIAGNOSIS — M25.512 CHRONIC LEFT SHOULDER PAIN: ICD-10-CM

## 2018-11-09 DIAGNOSIS — G89.29 CHRONIC LEFT SHOULDER PAIN: ICD-10-CM

## 2018-11-09 PROCEDURE — G8984 CARRY CURRENT STATUS: HCPCS | Mod: CL,PO

## 2018-11-09 PROCEDURE — 97162 PT EVAL MOD COMPLEX 30 MIN: CPT | Mod: PO

## 2018-11-09 PROCEDURE — G8985 CARRY GOAL STATUS: HCPCS | Mod: CJ,PO

## 2018-11-10 ENCOUNTER — PATIENT MESSAGE (OUTPATIENT)
Dept: INTERNAL MEDICINE | Facility: CLINIC | Age: 73
End: 2018-11-10

## 2018-11-11 ENCOUNTER — PATIENT MESSAGE (OUTPATIENT)
Dept: INTERNAL MEDICINE | Facility: CLINIC | Age: 73
End: 2018-11-11

## 2018-11-11 DIAGNOSIS — I50.30 HYPERTENSIVE HEART DISEASE WITH DIASTOLIC HEART FAILURE: Primary | ICD-10-CM

## 2018-11-11 DIAGNOSIS — E11.21 TYPE 2 DIABETES MELLITUS WITH MACROALBUMINURIC DIABETIC NEPHROPATHY: ICD-10-CM

## 2018-11-11 DIAGNOSIS — I11.0 HYPERTENSIVE HEART DISEASE WITH DIASTOLIC HEART FAILURE: Primary | ICD-10-CM

## 2018-11-11 PROBLEM — N18.30 CKD (CHRONIC KIDNEY DISEASE) STAGE 3, GFR 30-59 ML/MIN: Status: ACTIVE | Noted: 2017-04-20

## 2018-11-11 PROBLEM — M81.0 OSTEOPOROSIS WITHOUT CURRENT PATHOLOGICAL FRACTURE: Status: ACTIVE | Noted: 2018-11-11

## 2018-11-11 RX ORDER — VALSARTAN 320 MG/1
320 TABLET ORAL DAILY
Qty: 90 TABLET | Refills: 1 | Status: SHIPPED | OUTPATIENT
Start: 2018-11-11 | End: 2019-03-28 | Stop reason: SDUPTHER

## 2018-11-11 RX ORDER — LANCETS
1 EACH MISCELLANEOUS DAILY
Qty: 100 EACH | Refills: 3 | Status: SHIPPED | OUTPATIENT
Start: 2018-11-11

## 2018-11-11 RX ORDER — SIMVASTATIN 10 MG/1
10 TABLET, FILM COATED ORAL NIGHTLY
Qty: 90 TABLET | Refills: 1 | Status: SHIPPED | OUTPATIENT
Start: 2018-11-11 | End: 2019-05-20 | Stop reason: SDUPTHER

## 2018-11-11 RX ORDER — INSULIN PUMP SYRINGE, 3 ML
EACH MISCELLANEOUS
Qty: 1 EACH | Refills: 0 | Status: SHIPPED | OUTPATIENT
Start: 2018-11-11 | End: 2019-09-30

## 2018-11-11 RX ORDER — HYDROCHLOROTHIAZIDE 12.5 MG/1
12.5 CAPSULE ORAL DAILY
Qty: 90 CAPSULE | Refills: 1 | Status: SHIPPED | OUTPATIENT
Start: 2018-11-11 | End: 2019-01-28
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
## 2018-11-12 PROBLEM — M25.512 LEFT SHOULDER PAIN: Status: ACTIVE | Noted: 2018-11-12

## 2018-11-12 NOTE — PLAN OF CARE
"[facility]  Physical Therapy Initial Evaluation    Name: Kaylee Romero  Clinic Number: [id number]    Therapy Diagnosis:   Diagnosis:   Encounter Diagnosis   Name Primary?    Chronic left shoulder pain      Physician: Elvia Whitehead PA  Physician Orders: PT Eval and Treat L shoulder pain  Medical Diagnosis from Referral: Shoulder impingement syndrome, left  Evaluation Date: 11/9/2018  Authorization Period Expiration: 12/31/18  Plan of Care Expiration: 1/30/19  Visit # / Visits authorized: 1/ 20    Time In: 2:00  Time Out: 3:00  Total Billable Time: 60 minutes    Precautions: Standard, suspected TIA 11/4/18, hx of chest "pressure" on exertion, DM II, and hx of poor compliance w/ HTN medication    Subjective   Date of onset: 9/21/18  History of current condition - Kaylee reports: she started having L shoulder pain following a fall 9/21/18. Pt reports she lost her footing descending stairs however she fell on railings . Pt reports she has had 4 falls since August 1 mechanical from standing and 3 secondary to wheeled chairs slipping out from under her.  Pt reports she prefers to scoot around her house in wheeled chair to decrease risk of falls. Pt reports initially after the fall the pain used to wake her up at night, but now it doesn't, however pt reports she still has limited range of motion and mobility. Pt reports she has difficulty don/doffing bra strap, necklace and shirt. Pt requires help to wash her back, can't reach things above shoulder height and she is unable to hold anything over 5# (unable to carry gallon of milk w/ L UE). Pt has hx of HTN, reports inconsistency with taking medication because she believes some medications make her BP higher (reports her systolic BP is usually in the 150's at its lowest; hx of chest "pressure" on exertion but no chest pain). Pt reports last Sunday she experienced a TIA; reports she was babbling, unable to put words together to form a " sentence and she experienced bilateral hand weakness/ uncoordination while typing, pt reports she has residual word finding issues and feels a little more unsteady on her feet than PLOF (during subjective unable to notice any impairments in speech). Pt reports she is following up with cardiologist in regards to recent TIA. Pt would like to return to her walking program, restore mobility in her shoulder and be prepared for a potential trip to [place] in 2019.    Past Medical History:   Diagnosis Date    Acid reflux     Arthritis     Cataract     Diabetes mellitus, type 2     Dry mouth     Hyperlipidemia 2014    Hypertension     KAMRAN (obstructive sleep apnea)      Kaylee Romero  has a past surgical history that includes  section; ankle surgery (l); Appendectomy; Knee arthroscopy w/ debridement; Dilation and curettage of uterus; Knee surgery (Left, 2017); and REPLACEMENT-KNEE-TOTAL (Left, 2017).    Kaylee has a current medication list which includes the following prescription(s): accu-chek fastclix lancing dev, alcohol swabs, aspirin, blood sugar diagnostic, diphenhydramine hcl, dulaglutide, hydrochlorothiazide, naproxen sodium, onetouch delica lancets, and valsartan.    Review of patient's allergies indicates:   Allergen Reactions    Keflex [cephalexin] Other (See Comments)     Turns orange    Bactrim [sulfamethoxazole-trimethoprim]      Generic version  Sulfa makes her sick        Imaging, X-ray: 10/4/18   COMPARISON: 2018  FINDINGS:  Mild degenerative changes at the acromioclavicular joint can be seen.  No soft tissue calcification is identified.  Bony structures are intact.    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  Prior Level of  Function: Pt independent with all ADLs, leading international educational trips and walking without difficulty  Current Level of Function: Pt relies on  to help with dressing, pt is unable to perform overhead/reaching tasks and pt tolerates minimal walking (prefers to scoot around house in wheeled chairs; has had falls doing this)    Pain:  Current 3/10, worst 9/10, best 2/10   Location: left shoulder   Description: Aching, Dull, Burning, Throbbing, Sharp, Electric, Shooting and Variable  Aggravating Factors: Touching, Night Time, Morning, Extension, Flexing, Lifting and Getting out of bed/chair  Easing Factors: relaxation, pain medication, ice, hot bath and rest    Pts goals: 2 miles a day, reduced pain in the shoulder, increase ROM    Objective     BP taken pre- evaluation: 188/92 in L UE while sitting    R shoulder AROM/PROM WNL and pain free  AROM Left Comment Normal   Shoulder Flexion: 140 degrees  P! 180 deg   Shoulder Extension: 20 degrees P! 60 deg   Shoulder Abduction: 130 degrees P! 180 deg   Shoulder ER: @90 40 degrees P! 90 deg   Shoulder IR: @90 60 degrees P! 90 deg   PROM Left Comment Normal   Shoulder Flexion: 75 degrees  P! 180 deg   Shoulder Abduction: 85 degrees  P! 180 deg   Shoulder ER, 90°ABD: 40 degrees  P!  90 deg   Shoulder IR, 90° ABD: 48 degrees  P! 90 deg   *denotes pain    MMT   Right Left Comment   Shoulder flexion: 4+/5 3+/5  P! On L   Shoulder Abduction: 4+/5 3+/5  P! On L   Shoulder ER: 4+/5 3+/5  P! On L   Shoulder IR: 4+/5 4-/5      Special Tests    - Neers: left positive  - Arzate-Henry: left positive  - Lift-off: left positive  - Drop Arm: left positive  - Full/Empty Can: left positive  - Biceps Load I/II: left positive  - Scour left negative      CMS Impairment/Limitation/Restriction for FOTO Shoulder Survey    Therapist reviewed FOTO scores for Kaylee Romero on 11/9/2018.   FOTO documents entered into [employer] - see Media section.    Limitation Score: 54%  Category:  Carrying    Current : CK = at least 40% but < 60% impaired, limited or restricted  Goal: CJ = at least 20% but < 40% impaired, limited or restricted          Pt reports involuntary shaking to bilat UE's during objective measurements while lying supine, further tests and measures held. Pt's LE's were elevated for 5 min, pt then assisted into sitting sx monitored. Pt's BP in sitting in L UE: 176/88. Pt reports that she did feel cold, UE sx resolved. Pt walked out able to schedule appointments, pt driven home by , instructed to seek immediate medical attention of sx return or persist, pt demonstrates good understanding    TREATMENT   Treatment Time In: n/a  Treatment Time Out: n/a  Total Treatment time separate from Evaluation: 0 minutes    Home Exercises and Patient Education Provided    Education provided re: anatomy, hydration, diet, stress management strategies, signs/sx of MI and CVA, importance of complying with prescribed medications, roll of PT, POC    Written Home Exercises Provided: Pt was instructed to focus on hydration, limiting ambulation in wheeled chairs d/t fall risk and stress management strategies.  Exercises were reviewed and Kaylee was able to demonstrate them prior to the end of the session.   Pt received a written copy of exercises to perform at home. Kaylee demonstrated good  understanding of the education provided.     Assessment   Kaylee is a 73 y.o. female referred to outpatient Physical Therapy with a medical diagnosis of L shoulder pain. Pt presents with decreased strength, decreased ROM, decreased flexibility, faulty posture, and increased pain. Due to impairments, pt is unable to perform ADLs, tolerate extended periods of any activity and is at risk for cardiac event.     Pt prognosis is Good.   Pt will benefit from skilled outpatient Physical Therapy to address the deficits stated above and in the chart below, provide pt/family education, and to maximize pt's level of independence.      Plan of care discussed with patient: Yes  Pt's spiritual, cultural and educational needs considered and patient is agreeable to the plan of care and goals as stated below:     Anticipated Barriers for therapy: co-morbidities, motivation for lifestyle change, compliance with medication    Medical Necessity is demonstrated by the following  History  Co-morbidities and personal factors that may impact the plan of care Co-morbidities:   anxiety, CKD stage 3, diabetes, high BMI, history of CVA, HTN, level of undertstanding of current condition and poor medication/medical compliance    Personal Factors:   lifestyle  attitudes     moderate   Examination  Body Structures and Functions, activity limitations and participation restrictions that may impact the plan of care Body Regions:   upper extremities    Body Systems:    ROM  strength  balance  gait  transfers    Participation Restrictions:   International educational excursions, recreational physical activity    Activity limitations:   Learning and applying knowledge  no deficits    General Tasks and Commands  no deficits    Communication  no deficits    Mobility  lifting and carrying objects  walking  fine hand use (grasping/picking up)  moving around using equipment (WC)  using transportation (bus, train, plane, car)  driving (bike, car, motorcycle)    Self care  washing oneself (bathing, drying, washing hands)  caring for body parts (brushing teeth, shaving, grooming)  toileting  dressing  looking after one's health    Domestic Life  shopping  cooking  doing house work (cleaning house, washing dishes, laundry)    Interactions/Relationships  no deficits    Life Areas  no deficits    Community and Social Life  no deficits         moderate   Clinical Presentation evolving clinical presentation with changing clinical characteristics moderate   Decision Making/ Complexity Score: moderate     Goals:  Short Term Goals: 3 weeks   1. Pt will be independent with HEP,  demonstrate good form and report participating in walking program for at least 15 minutes/day 5 days/week  2. Pt will demonstrate pain free PROM of the L shoulder of at least 140 deg flexion, 110 deg abduction and 50 degrees of ER/IR @90 deg abduction to demonstrate improved functional mobility  3. Pt will report good compliance with prescribed medications, will report drinking at least 75 ounces of water/day and will report one healthy dietary change in order to improve tolerance to physical activity and reduce the risk of chest pain on exertion    Long Term Goals: 10 weeks   1. Pt will demonstrate full/symmetrical AROM to bilat shoulders reporting no pain at end range to demonstrate improved functional mobility  2. Pt will be able to independently don/doff shirt/ bra strap without reports of pain to maximize pt's independence   3. Pt will be able to lift 5# overhead 5x and be able to carry 15# 25ft with L UE reporting less than 2/10 pain in order to demonstrate improved shoulder strength and function.    Plan   Plan of care Certification: 11/9/2018 to 1/30/19.    Outpatient Physical Therapy 2 times weekly for 10 weeks to include the following interventions: Cervical/Lumbar Traction, Electrical Stimulation TENS, Manual Therapy, Moist Heat/ Ice, Neuromuscular Re-ed, Patient Education, Self Care, Therapeutic Activites, Therapeutic Exercise and Ultrasound.     Leonard Germain, PT

## 2018-11-13 ENCOUNTER — CLINICAL SUPPORT (OUTPATIENT)
Dept: REHABILITATION | Facility: HOSPITAL | Age: 73
End: 2018-11-13
Payer: MEDICARE

## 2018-11-13 DIAGNOSIS — M25.512 CHRONIC LEFT SHOULDER PAIN: ICD-10-CM

## 2018-11-13 DIAGNOSIS — G89.29 CHRONIC LEFT SHOULDER PAIN: ICD-10-CM

## 2018-11-13 PROCEDURE — 97110 THERAPEUTIC EXERCISES: CPT | Mod: PO

## 2018-11-13 PROCEDURE — 97012 MECHANICAL TRACTION THERAPY: CPT | Mod: PO

## 2018-11-13 NOTE — PROGRESS NOTES
"Name: Kaylee Urena Dalzell  Clinic Number: [id number]  Date of Treatment: 11/13/2018   Diagnosis: No diagnosis found.    Physician: Elvia Whitehead PA    Time in: 4:00  Time Out: 4:50  Total Treatment Time: 50 min  Total 1:1 time: 30 min  Date of eval: 11/9/18  Visit #: 2/20  Auth expiration: 12/31/18  POC expiration: 1/30/19    Precautions: Standard, suspected TIA 11/4/18, hx of chest "pressure" on exertion, DM II, and hx of poor compliance with HTN medication    Subjective     Kaylee reports she has been feeling about the same since evaluation. Pt reports she has been taking two of her BP medications however she continues to hold 2 other prescribed medications.  Patient reports their pain to be 5/10 on a 0-10 scale with 0 being no pain and 10 being the worst pain imaginable.    Objective     Kaylee received therapeutic exercises to develop strength, endurance, ROM, flexibility and posture for 30 minutes including:     BP in sitting 178/88    Supine    Dowel OH flexion 2x20  Dowel protraction 2x20  D2 flexion extension AAROM (reduced ROM)    Seated    Scapular retraction 3 sec holds 2x10  Shoulder pulleys 2x20    Standing    Pendullum 2x20 CW/CCW    Kaylee received the following manual therapy techniques: Joint mobilizations, Manual traction, Myofacial release and Soft tissue Mobilization were applied to the: L shoulder for 20 minutes.     STM to pec minor, delt, upper trap, subscapularis  Post GH mob Gr II-III  Light GH traction in 45-90 deg abduction    BP following tx: 188/88    Pt received 10 min ice pack to L shoulder post treat for pain/inflammation control    Written Home Exercises Provided: Pendullum, scapular retraction    Pt educated on importance of taking BP medication, risks of cardiac event if BP is not better managed, hydration, diet. Pt demo fair understanding of the education provided. Kaylee demonstrated fair return demonstration of activities.     Assessment   Kaylee presents with improved pain free " "AROM/PROM since evaluation. Pt seems to have difficult time sensing when an exercise becomes painful, reports "pain will catch up with me a few minutes later" . Pt benefits from extra sets of fewer repetitions in order to check in with her sx and ensure she is not pushing herself too far. Pt tolerates all exercises well however reports increased soreness at the end of today's session. Pt was urged to reach out to her primary care about her BP medications.     Pt will continue to benefit from skilled PT intervention. Medical Necessity is demonstrated by:  Unable to participate in daily activities, Continued inability to participate in vocational pursuits, Pain limits function of effected part for some activities, Unable to participate fully in daily activities, Requires skilled supervision to complete and progress HEP and Weakness.    Patient is making fair progress towards established goals.    Goals:  Short Term Goals: 3 weeks   1. Pt will be independent with HEP, demonstrate good form and report participating in walking program for at least 15 minutes/day 5 days/week  2. Pt will demonstrate pain free PROM of the L shoulder of at least 140 deg flexion, 110 deg abduction and 50 degrees of ER/IR @90 deg abduction to demonstrate improved functional mobility  3. Pt will report good compliance with prescribed medications, will report drinking at least 75 ounces of water/day and will report one healthy dietary change in order to improve tolerance to physical activity and reduce the risk of chest pain on exertion     Long Term Goals: 10 weeks   1. Pt will demonstrate full/symmetrical AROM to bilat shoulders reporting no pain at end range to demonstrate improved functional mobility  2. Pt will be able to independently don/doff shirt/ bra strap without reports of pain to maximize pt's independence   3. Pt will be able to lift 5# overhead 5x and be able to carry 15# 25ft with L UE reporting less than 2/10 pain in order to " demonstrate improved shoulder strength and function.    New/Revised goals: none at this time      Plan       Continue with established Plan of Care towards PT goals.

## 2018-11-14 ENCOUNTER — TELEPHONE (OUTPATIENT)
Dept: REHABILITATION | Facility: HOSPITAL | Age: 73
End: 2018-11-14

## 2018-11-14 NOTE — TELEPHONE ENCOUNTER
Returned pt's call regarding blood pressure medication question, left message urging patient to contact her primary care physician and ask what regimen they would like her to be on moving forward. Educated pt that I do not feel comfortable having patient do strenuous exercise due to risk for another cardiac event (recent TIA about 2 weeks ago).

## 2018-11-23 ENCOUNTER — CLINICAL SUPPORT (OUTPATIENT)
Dept: REHABILITATION | Facility: HOSPITAL | Age: 73
End: 2018-11-23
Payer: MEDICARE

## 2018-11-23 DIAGNOSIS — M25.512 CHRONIC LEFT SHOULDER PAIN: ICD-10-CM

## 2018-11-23 DIAGNOSIS — G89.29 CHRONIC LEFT SHOULDER PAIN: ICD-10-CM

## 2018-11-23 PROCEDURE — 97110 THERAPEUTIC EXERCISES: CPT | Mod: PO

## 2018-11-23 PROCEDURE — 97140 MANUAL THERAPY 1/> REGIONS: CPT | Mod: PO

## 2018-11-23 NOTE — PROGRESS NOTES
"Name: Kaylee Urena Aitkin  Clinic Number: [id number]  Date of Treatment: 11/23/2018   Diagnosis:   Encounter Diagnosis   Name Primary?    Chronic left shoulder pain        Physician: Elvia Whitehead PA    Time in: 11:22  Time Out: 12:15  Total Treatment Time: 47 min  Total 1:1 time: 47 min  Date of eval: 11/9/18  Visit #: 3/20  Auth expiration: 12/31/18  POC expiration: 1/30/19    Precautions: Standard, suspected TIA 11/4/18, hx of chest "pressure" on exertion, DM II, and hx of poor compliance with HTN medication    Subjective     Kaylee reports was feeling good after last treatment, tried to do some of the exercises which haven't hurt.  Patient reports their pain to be 5/10 on a 0-10 scale with 0 being no pain and 10 being the worst pain imaginable.    Objective     Kaylee received therapeutic exercises to develop strength, endurance, ROM, flexibility and posture for 27 minutes including:     BP in sitting 174/86    Supine    Dowel OH flexion 2x20  Dowel protraction 2x20  D2 flexion extension AAROM (reduced ROM)    Seated    Scapular retraction 3 sec holds 2x10  Shoulder pulleys 2x20 - NP    Standing    YTB I's 2x12  YTB rows 2x12  Pendullum 1# 2x20 CW/CCW - NP    Kaylee received the following manual therapy techniques: Joint mobilizations, Manual traction, Myofacial release and Soft tissue Mobilization were applied to the: L shoulder for 20 minutes.     STM to pec minor, delt, upper trap, subscapularis  Post GH mob Gr II-III  Light GH traction in 45-90 deg abduction    Pt received 10 min ice pack to L shoulder post treat for pain/inflammation control    Written Home Exercises Provided: Pendullum, scapular retraction    Pt educated on importance of taking BP medication, risks of cardiac event if BP is not better managed, hydration, diet. Pt demo fair understanding of the education provided. Kaylee demonstrated fair return demonstration of activities.     Assessment   Kaylee presents with continued elevated BP. Pt " educated that she should follow up with her PCP/ cardiologist about her consistent HTN as it is not safe to exert herself or progress exercises safely. Pt demonstrates good understanding. Pt tolerates light periscapular resistance exercises well, demonstrates good understanding of scapular positioning with postural cues. Will continue to progress ROM and strengthening to pt tolerance.    Pt will continue to benefit from skilled PT intervention. Medical Necessity is demonstrated by:  Unable to participate in daily activities, Continued inability to participate in vocational pursuits, Pain limits function of effected part for some activities, Unable to participate fully in daily activities, Requires skilled supervision to complete and progress HEP and Weakness.    Patient is making fair progress towards established goals.    Goals:  Short Term Goals: 3 weeks   1. Pt will be independent with HEP, demonstrate good form and report participating in walking program for at least 15 minutes/day 5 days/week  2. Pt will demonstrate pain free PROM of the L shoulder of at least 140 deg flexion, 110 deg abduction and 50 degrees of ER/IR @90 deg abduction to demonstrate improved functional mobility  3. Pt will report good compliance with prescribed medications, will report drinking at least 75 ounces of water/day and will report one healthy dietary change in order to improve tolerance to physical activity and reduce the risk of chest pain on exertion     Long Term Goals: 10 weeks   1. Pt will demonstrate full/symmetrical AROM to bilat shoulders reporting no pain at end range to demonstrate improved functional mobility  2. Pt will be able to independently don/doff shirt/ bra strap without reports of pain to maximize pt's independence   3. Pt will be able to lift 5# overhead 5x and be able to carry 15# 25ft with L UE reporting less than 2/10 pain in order to demonstrate improved shoulder strength and function.    New/Revised goals:  none at this time      Plan     Next: serratus strengthening, iso walk outs    Continue with established Plan of Care towards PT goals.

## 2018-11-26 ENCOUNTER — PATIENT MESSAGE (OUTPATIENT)
Dept: INTERNAL MEDICINE | Facility: CLINIC | Age: 73
End: 2018-11-26

## 2018-12-03 ENCOUNTER — OFFICE VISIT (OUTPATIENT)
Dept: OPTOMETRY | Facility: CLINIC | Age: 73
End: 2018-12-03
Payer: MEDICARE

## 2018-12-03 DIAGNOSIS — H00.14 CHALAZION OF LEFT UPPER EYELID: Primary | ICD-10-CM

## 2018-12-03 DIAGNOSIS — H53.8 BLURRED VISION, LEFT EYE: ICD-10-CM

## 2018-12-03 DIAGNOSIS — H02.886 MEIBOMIAN GLAND DYSFUNCTION (MGD) OF BOTH EYES: ICD-10-CM

## 2018-12-03 DIAGNOSIS — H02.883 MEIBOMIAN GLAND DYSFUNCTION (MGD) OF BOTH EYES: ICD-10-CM

## 2018-12-03 DIAGNOSIS — Z13.5 SCREENING FOR EYE CONDITION: ICD-10-CM

## 2018-12-03 PROCEDURE — 92012 INTRM OPH EXAM EST PATIENT: CPT | Mod: S$GLB,,, | Performed by: OPTOMETRIST

## 2018-12-03 PROCEDURE — 99999 PR PBB SHADOW E&M-EST. PATIENT-LVL III: CPT | Mod: PBBFAC,,, | Performed by: OPTOMETRIST

## 2018-12-03 RX ORDER — NEOMYCIN SULFATE, POLYMYXIN B SULFATE, AND DEXAMETHASONE 3.5; 10000; 1 MG/G; [USP'U]/G; MG/G
OINTMENT OPHTHALMIC
Qty: 3.5 G | Refills: 0 | Status: SHIPPED | OUTPATIENT
Start: 2018-12-03 | End: 2018-12-13

## 2018-12-03 RX ORDER — DOXYCYCLINE HYCLATE 100 MG
100 TABLET ORAL DAILY
Qty: 30 TABLET | Refills: 2 | Status: SHIPPED | OUTPATIENT
Start: 2018-12-03 | End: 2019-01-02

## 2018-12-03 NOTE — PROGRESS NOTES
"HPI     Eye Pain      Additional comments: Bump on AGUSTIN -   Symptoms started approx six days ago - very tender, but less now.  VA in OS blurry now,but seemed okay prior to six days ago.  Wears glasses full-time.               Comments     Patient in for progress check.    Patient states she has been AGUSTIN pain (scale 2-3) for about 3 days. Looks   like a "bubble", possible stye of AGUSTIN.    Being followed for (diagnosis): possible stye OS      Date last seen:  07/24/2018    Doctor last seen:  Dr. Pelayo     Prescribed eye medications(s) using:      OTC eye medication(s) using:      Signs/symptoms of condition resolved/better/stable/worse?:      Allergies/Medications reviewed today?  Yes                 Last edited by Christopher Pelayo, OD on 12/3/2018 11:18 AM. (History)            Assessment /Plan     For exam results, see Encounter Report.    1. Chalazion of left upper eyelid     2. Meibomian gland dysfunction (MGD) of both eyes  doxycycline (VIBRA-TABS) 100 MG tablet   3. Blurred vision, left eye     4. 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  Ms. Rmoero presentst today with signs/symptoms consistent with chalazion of the left upper lid.  Note previous history of chalazion on the right side.    Examination reveals presence of meibomian gland dysfunction, with inspissation of glands.    Blurred VA in left eye since presentation of symptoms.    OCT of retina at macula reveals no abnormality of macula in either eye.    Prescribed Maxitrol ophthalmic ointment (or generic equivalent) for instillation into the left eye two times per day until seen again.    Warm compresses to the left eye (both okay) as often as desired throughout the day.    Consider B&L TheraPearl mask for warm compresses.     Prescribed Doxycycline 100 mg tablets. Take one tablet by mouth per day until seen again.      Return in 10 days for recheck.       "

## 2018-12-03 NOTE — PATIENT INSTRUCTIONS
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  Ms. Romero presentst today with signs/symptoms consistent with chalazion of the left upper lid.  Note previous history of chalazion on the right side.    Examination reveals presence of meibomian gland dysfunction, with inspissation of glands.    Blurred VA in left eye since presentation of symptoms.    OCT of retina at macula reveals no abnormality of macula in either eye.    Prescribed Maxitrol ophthalmic ointment (or generic equivalent) for instillation into the left eye two times per day until seen again.    Warm compresses to the left eye (both okay) as often as desired throughout the day.    Consider B&L TheraPearl mask for warm compresses.     Prescribed Doxycycline 100 mg tablets. Take one tablet by mouth per day until seen again.      Return in 10 days for recheck.

## 2018-12-10 ENCOUNTER — OFFICE VISIT (OUTPATIENT)
Dept: INTERNAL MEDICINE | Facility: CLINIC | Age: 73
End: 2018-12-10
Payer: MEDICARE

## 2018-12-10 ENCOUNTER — LAB VISIT (OUTPATIENT)
Dept: LAB | Facility: HOSPITAL | Age: 73
End: 2018-12-10
Attending: INTERNAL MEDICINE
Payer: MEDICARE

## 2018-12-10 VITALS
HEIGHT: 64 IN | SYSTOLIC BLOOD PRESSURE: 170 MMHG | WEIGHT: 222.69 LBS | DIASTOLIC BLOOD PRESSURE: 110 MMHG | BODY MASS INDEX: 38.02 KG/M2 | HEART RATE: 90 BPM

## 2018-12-10 DIAGNOSIS — R07.9 CHEST PAIN, UNSPECIFIED TYPE: ICD-10-CM

## 2018-12-10 DIAGNOSIS — E11.22 TYPE 2 DIABETES MELLITUS WITH STAGE 3 CHRONIC KIDNEY DISEASE, WITHOUT LONG-TERM CURRENT USE OF INSULIN: ICD-10-CM

## 2018-12-10 DIAGNOSIS — N18.30 TYPE 2 DIABETES MELLITUS WITH STAGE 3 CHRONIC KIDNEY DISEASE, WITHOUT LONG-TERM CURRENT USE OF INSULIN: ICD-10-CM

## 2018-12-10 DIAGNOSIS — I11.0 HYPERTENSIVE HEART DISEASE WITH DIASTOLIC HEART FAILURE: Primary | ICD-10-CM

## 2018-12-10 DIAGNOSIS — I50.30 HYPERTENSIVE HEART DISEASE WITH DIASTOLIC HEART FAILURE: Primary | ICD-10-CM

## 2018-12-10 LAB
ALBUMIN SERPL BCP-MCNC: 3.2 G/DL
ALP SERPL-CCNC: 90 U/L
ALT SERPL W/O P-5'-P-CCNC: 16 U/L
ANION GAP SERPL CALC-SCNC: 8 MMOL/L
AST SERPL-CCNC: 14 U/L
BILIRUB SERPL-MCNC: 0.3 MG/DL
BUN SERPL-MCNC: 16 MG/DL
CALCIUM SERPL-MCNC: 9.2 MG/DL
CHLORIDE SERPL-SCNC: 105 MMOL/L
CK SERPL-CCNC: 83 U/L
CO2 SERPL-SCNC: 25 MMOL/L
CREAT SERPL-MCNC: 1 MG/DL
CREAT UR-MCNC: 54 MG/DL
EST. GFR  (AFRICAN AMERICAN): >60 ML/MIN/1.73 M^2
EST. GFR  (NON AFRICAN AMERICAN): 57 ML/MIN/1.73 M^2
GLUCOSE SERPL-MCNC: 134 MG/DL
POTASSIUM SERPL-SCNC: 4.3 MMOL/L
PROT SERPL-MCNC: 7.6 G/DL
PROT UR-MCNC: 86 MG/DL
PROT/CREAT UR: 1.59 MG/G{CREAT}
SODIUM SERPL-SCNC: 138 MMOL/L

## 2018-12-10 PROCEDURE — 84156 ASSAY OF PROTEIN URINE: CPT

## 2018-12-10 PROCEDURE — 99214 OFFICE O/P EST MOD 30 MIN: CPT | Mod: S$GLB,,, | Performed by: INTERNAL MEDICINE

## 2018-12-10 PROCEDURE — 82550 ASSAY OF CK (CPK): CPT

## 2018-12-10 PROCEDURE — 1101F PT FALLS ASSESS-DOCD LE1/YR: CPT | Mod: CPTII,S$GLB,, | Performed by: INTERNAL MEDICINE

## 2018-12-10 PROCEDURE — 80053 COMPREHEN METABOLIC PANEL: CPT

## 2018-12-10 PROCEDURE — 3046F HEMOGLOBIN A1C LEVEL >9.0%: CPT | Mod: CPTII,S$GLB,, | Performed by: INTERNAL MEDICINE

## 2018-12-10 PROCEDURE — 3077F SYST BP >= 140 MM HG: CPT | Mod: CPTII,S$GLB,, | Performed by: INTERNAL MEDICINE

## 2018-12-10 PROCEDURE — 3080F DIAST BP >= 90 MM HG: CPT | Mod: CPTII,S$GLB,, | Performed by: INTERNAL MEDICINE

## 2018-12-10 PROCEDURE — 36415 COLL VENOUS BLD VENIPUNCTURE: CPT

## 2018-12-10 PROCEDURE — 99999 PR PBB SHADOW E&M-EST. PATIENT-LVL III: CPT | Mod: PBBFAC,,, | Performed by: INTERNAL MEDICINE

## 2018-12-10 RX ORDER — AMLODIPINE BESYLATE 5 MG/1
5 TABLET ORAL DAILY
Qty: 90 TABLET | Refills: 1 | Status: SHIPPED | OUTPATIENT
Start: 2018-12-10 | End: 2019-03-28 | Stop reason: DRUGHIGH

## 2018-12-11 ENCOUNTER — OFFICE VISIT (OUTPATIENT)
Dept: NEUROLOGY | Facility: CLINIC | Age: 73
End: 2018-12-11
Payer: MEDICARE

## 2018-12-11 ENCOUNTER — PATIENT MESSAGE (OUTPATIENT)
Dept: INTERNAL MEDICINE | Facility: CLINIC | Age: 73
End: 2018-12-11

## 2018-12-11 VITALS
HEIGHT: 63 IN | BODY MASS INDEX: 39.61 KG/M2 | HEART RATE: 78 BPM | WEIGHT: 223.56 LBS | SYSTOLIC BLOOD PRESSURE: 190 MMHG | DIASTOLIC BLOOD PRESSURE: 90 MMHG

## 2018-12-11 DIAGNOSIS — E66.01 SEVERE OBESITY (BMI 35.0-39.9) WITH COMORBIDITY: ICD-10-CM

## 2018-12-11 DIAGNOSIS — E78.2 MIXED HYPERLIPIDEMIA: ICD-10-CM

## 2018-12-11 DIAGNOSIS — Z86.73 HISTORY OF TIA (TRANSIENT ISCHEMIC ATTACK): Primary | ICD-10-CM

## 2018-12-11 DIAGNOSIS — G45.9 TIA (TRANSIENT ISCHEMIC ATTACK): ICD-10-CM

## 2018-12-11 DIAGNOSIS — E11.8 TYPE 2 DIABETES MELLITUS WITH COMPLICATION, WITHOUT LONG-TERM CURRENT USE OF INSULIN: ICD-10-CM

## 2018-12-11 DIAGNOSIS — G45.9 TRANSIENT CEREBRAL ISCHEMIA, UNSPECIFIED TYPE: ICD-10-CM

## 2018-12-11 DIAGNOSIS — I50.30 HYPERTENSIVE HEART DISEASE WITH DIASTOLIC HEART FAILURE: Primary | ICD-10-CM

## 2018-12-11 DIAGNOSIS — I11.0 HYPERTENSIVE HEART DISEASE WITH DIASTOLIC HEART FAILURE: Primary | ICD-10-CM

## 2018-12-11 PROCEDURE — 3046F HEMOGLOBIN A1C LEVEL >9.0%: CPT | Mod: CPTII,S$GLB,, | Performed by: PSYCHIATRY & NEUROLOGY

## 2018-12-11 PROCEDURE — 99999 PR PBB SHADOW E&M-EST. PATIENT-LVL IV: CPT | Mod: PBBFAC,,, | Performed by: PSYCHIATRY & NEUROLOGY

## 2018-12-11 PROCEDURE — 3077F SYST BP >= 140 MM HG: CPT | Mod: CPTII,S$GLB,, | Performed by: PSYCHIATRY & NEUROLOGY

## 2018-12-11 PROCEDURE — 3080F DIAST BP >= 90 MM HG: CPT | Mod: CPTII,S$GLB,, | Performed by: PSYCHIATRY & NEUROLOGY

## 2018-12-11 PROCEDURE — 1101F PT FALLS ASSESS-DOCD LE1/YR: CPT | Mod: CPTII,S$GLB,, | Performed by: PSYCHIATRY & NEUROLOGY

## 2018-12-11 PROCEDURE — 99215 OFFICE O/P EST HI 40 MIN: CPT | Mod: S$GLB,,, | Performed by: PSYCHIATRY & NEUROLOGY

## 2018-12-11 NOTE — PROGRESS NOTES
Vascular Neurology  Clinic Note    Reason For Visit (Chief Complaint): TIA referral    HPI: 73 y.o. right handed female with recent episode of speech difficulty.  Event occurred in November, while she was on the telephone, suddenly lost the ability to articulate or think to put together a sentence and make her thoughts coherent. Shortly thereafter she noticed that she had a hard time typing d/t the difficulty with the letters. Event lasted 30 minutes.    The aforementioned symptoms have never happened prior to the event. There are no identified triggers or modifying factors. There have been no recurrent events. There are no other associated symptoms.    Patient's  is present and informed me that she had a blank stare on her face during this episodes.    Having fluttering in the heart, pain, shortness of breath, fluttering and palpitation. Has an injured shoulder on the left. Has uncontrolled blood pressure.     Blood pressure today is markedly elevated but she did not take her medications this AM. However daily BP checks at home have her at 150-170's despite 3 drug regimen (CCB, ACEi, HCTZ) which includes a diuretic but amlodipine is 2.5 mg and HCTZ is 12.5    Brain Imaging:  None  Vessel Imaging:  None  Cardiac Evaluation:  None  Other:   None  Relevant Labwork:  Recent Labs   Lab 04/03/18  1103 11/08/18  1547   HEMOGLOBIN A1C 7.3 H 9.8 H   LDL CHOLESTEROL 106.4  --    HDL 44  --    TRIGLYCERIDES 73  --    CHOLESTEROL 165  --      I reviewed the above labwork.    Review of Systems  Msk: negative for muscle pain  Skin: negative for pruritis  Neuro: negative for headache  All others negative    Past Medical History  Past Medical History:   Diagnosis Date    Acid reflux     Arthritis     Cataract     CKD (chronic kidney disease) stage 3, GFR 30-59 ml/min     Diabetes mellitus, type 2     Dry mouth     Hyperlipidemia 12/2/2014    Hypertension     KAMRAN (obstructive sleep apnea)     Osteoporosis   "    Family History  No relevant history   Social History  Never Smoker        Medication List           Accurate as of 12/11/18  4:41 PM. If you have any questions, ask your nurse or doctor.               CONTINUE taking these medications    alcohol swabs Padm  Commonly known as:  ALCOHOL PADS  Apply 1 each topically as needed.     amLODIPine 5 MG tablet  Commonly known as:  NORVASC  Take 1 tablet (5 mg total) by mouth once daily. For Blood Pressure.     aspirin 81 MG EC tablet  Commonly known as:  ECOTRIN  Take 1 tablet (81 mg total) by mouth once daily.     blood sugar diagnostic Strp  1 strip by Misc.(Non-Drug; Combo Route) route once daily.     blood-glucose meter kit  Use as instructed     doxycycline 100 MG tablet  Commonly known as:  VIBRA-TABS  Take 1 tablet (100 mg total) by mouth once daily.     dulaglutide 1.5 mg/0.5 mL Pnij  Commonly known as:  TRULICITY  Inject 1.5 mg into the skin once a week.     empagliflozin 10 mg Tab  Commonly known as:  JARDIANCE  Take 10 mg by mouth once daily. For Diabetes.     hydroCHLOROthiazide 12.5 mg capsule  Commonly known as:  MICROZIDE  Take 1 capsule (12.5 mg total) by mouth once daily.     lancets Misc  1 lancet by Misc.(Non-Drug; Combo Route) route once daily.     neomycin-polymyxin-dexamethasone 3.5 mg/g-10,000 unit/g-0.1 % Oint  Commonly known as:  DEXACINE  Instill into left eye two times per day for ten days as directed.     simvastatin 10 MG tablet  Commonly known as:  ZOCOR  Take 1 tablet (10 mg total) by mouth every evening. For Cholesterol.     valsartan 320 MG tablet  Commonly known as:  DIOVAN  Take 1 tablet (320 mg total) by mouth once daily.            EXAM  Vital Signs:Blood pressure (!) 190/90, pulse 78, height 5' 3" (1.6 m), weight 101.4 kg (223 lb 8.7 oz).  General: well appearing without discomfort   Mental Status:alert, oriented to person - place - age - month   Language: able to name, repeat, comprehend commands   Cranial Nerves: EOMI, PERRL, no " facial asymmetry, tongue to midline, palate midline  Motor: 5/5 power in all extremities, normal tone  Sensory: intact light touch bilaterally, intact proprioception bilaterally  Coordination: no ataxia on finger-to-nose or heel-to-shin testing; no truncal ataxia  Gait & Stance: normal    NIH Stroke Scale:    Level of Consciousness: 0 - alert  LOC Questions: 0 - answers both correctly  LOC Commands: 0 - performs both correctly  Best Gaze: 0 - normal  Visual: 0 - no visual loss  Facial Palsy: 0 - normal  Motor Left Arm: 0 - no drift  Motor Right Arm: 0 - no drift  Motor Left Le - no drift  Motor Right Le - no drift  Limb Ataxia: 0 - absent  Sensory: 0 - normal  Best Language: 0 - no aphasia  Dysarthria: 0 - normal articulation  Extinction and Inattention: 0 - no neglect  NIH Stroke Scale Total: 0        ___________________  ASSESSMENT & PLAN    Problem List Items Addressed This Visit        1 - High    History of TIA (transient ischemic attack) - Primary    Current Assessment & Plan     Etiology: Undetermined - Workup Incomplete  MIKHAIL Incomplete -- CE Incomplete --   Incomplete -- Other Absent   · Diagnostic Orders: MRI brain, CTA H&N, TTE, 30 day event monitor (c/o racing heart and palpitations)  · Secondary stroke prevention: Continue aspirin  · Continue current statin therapy   , goal LDL < 70  · Blood pressure goal < 130/80 mmHg; significant room for improvement over time; did not take medications today   · Stroke Risk Factors Addressed: HTN, HLD, DM, obesity  · Stroke education administered           Relevant Orders    MRI Brain Without Contrast    2D echo with color flow doppler    CTA Head and Neck (xpd)    Cardiac event monitor       Unprioritized    Hyperlipidemia    Severe obesity (BMI 35.0-39.9) with comorbidity    Type 2 diabetes mellitus with complication, without long-term current use of insulin    TIA (transient ischemic attack)      Other Visit Diagnoses     Transient cerebral ischemia,  unspecified type         Relevant Orders    Cardiac event monitor          MD Ramila  Vascular Neurology  Office [phone]  Fax [phone]

## 2018-12-11 NOTE — ASSESSMENT & PLAN NOTE
Etiology: Undetermined - Workup Incomplete  MIKHAIL Incomplete -- CE Incomplete --   Incomplete -- Other Absent   · Diagnostic Orders: MRI brain, CTA H&N, TTE, 30 day event monitor (c/o racing heart and palpitations)  · Secondary stroke prevention: Continue aspirin  · Continue current statin therapy   , goal LDL < 70  · Blood pressure goal < 130/80 mmHg; significant room for improvement over time; did not take medications today   · Stroke Risk Factors Addressed: HTN, HLD, DM, obesity  · Stroke education administered

## 2018-12-11 NOTE — LETTER
December 11, 2018      Pop Henderson MD  [address]  [place] [address]           [facility]  [address]  [place] [address]  Phone: [phone]          Patient: Kaylee Romero   MR Number: [id number]   YOB: 1945   Date of Visit: 12/11/2018       Dear Dr. Pop Henderson:    Thank you for referring Kaylee Romero to me for evaluation. Attached you will find relevant portions of my assessment and plan of care.    If you have questions, please do not hesitate to call me. I look forward to following Kaylee Romero along with you.    Sincerely,    Bello Cid MD    Enclosure  CC:  No Recipients    If you would like to receive this communication electronically, please contact [email or web address] or [phone] to request more information on [facility] access.    For providers and/or their staff who would like to refer a patient to [facility], please contact us through our one-stop-shop provider referral line, [facility] , at [phone].    If you feel you have received this communication in error or would no longer like to receive these types of communications, please e-mail [email or web address]

## 2018-12-12 ENCOUNTER — OFFICE VISIT (OUTPATIENT)
Dept: OPTOMETRY | Facility: CLINIC | Age: 73
End: 2018-12-12
Payer: MEDICARE

## 2018-12-12 DIAGNOSIS — H02.886 MEIBOMIAN GLAND DYSFUNCTION (MGD) OF BOTH EYES: ICD-10-CM

## 2018-12-12 DIAGNOSIS — H00.14 CHALAZION OF LEFT UPPER EYELID: Primary | ICD-10-CM

## 2018-12-12 DIAGNOSIS — H02.883 MEIBOMIAN GLAND DYSFUNCTION (MGD) OF BOTH EYES: ICD-10-CM

## 2018-12-12 DIAGNOSIS — H25.13 NUCLEAR SCLEROSIS, BILATERAL: ICD-10-CM

## 2018-12-12 PROCEDURE — 92012 INTRM OPH EXAM EST PATIENT: CPT | Mod: S$GLB,,, | Performed by: OPTOMETRIST

## 2018-12-12 PROCEDURE — 99999 PR PBB SHADOW E&M-EST. PATIENT-LVL III: CPT | Mod: PBBFAC,,, | Performed by: OPTOMETRIST

## 2018-12-12 NOTE — PATIENT INSTRUCTIONS
MsTl Romero in today for follow-up on chalazion of the left upper eyelid.  Chalazion decreased in size, without tenderness on palpation..  Okay to discontinue oitnment.  Decrease doxycycline to 50 mg (one-half of 100 mg tablet) per day until out.  Continue regular and diligent use of warm compresses to left upper eyelid   Return if any worsening or signs/symptoms.  Otherwise, return when appropriate for general eye examination

## 2018-12-13 ENCOUNTER — HOSPITAL ENCOUNTER (OUTPATIENT)
Dept: RADIOLOGY | Facility: HOSPITAL | Age: 73
Discharge: HOME | End: 2018-12-13
Attending: INTERNAL MEDICINE
Payer: MEDICARE

## 2018-12-13 ENCOUNTER — PATIENT MESSAGE (OUTPATIENT)
Dept: INTERNAL MEDICINE | Facility: CLINIC | Age: 73
End: 2018-12-13

## 2018-12-13 ENCOUNTER — HOSPITAL ENCOUNTER (OUTPATIENT)
Dept: CARDIOLOGY | Facility: CLINIC | Age: 73
Discharge: HOME | End: 2018-12-13
Attending: INTERNAL MEDICINE
Payer: MEDICARE

## 2018-12-13 VITALS — WEIGHT: 222 LBS | BODY MASS INDEX: 39.34 KG/M2 | HEIGHT: 63 IN

## 2018-12-13 DIAGNOSIS — E11.22 TYPE 2 DIABETES MELLITUS WITH STAGE 3 CHRONIC KIDNEY DISEASE, WITHOUT LONG-TERM CURRENT USE OF INSULIN: ICD-10-CM

## 2018-12-13 DIAGNOSIS — N18.30 TYPE 2 DIABETES MELLITUS WITH STAGE 3 CHRONIC KIDNEY DISEASE, WITHOUT LONG-TERM CURRENT USE OF INSULIN: ICD-10-CM

## 2018-12-13 DIAGNOSIS — R07.9 CHEST PAIN, UNSPECIFIED TYPE: ICD-10-CM

## 2018-12-13 DIAGNOSIS — Z12.31 ENCOUNTER FOR SCREENING MAMMOGRAM FOR BREAST CANCER: ICD-10-CM

## 2018-12-13 LAB
ASCENDING AORTA: 2.62 CM
BSA FOR ECHO PROCEDURE: 2.12 M2
CV ECHO LV RWT: 0.5 CM
CV STRESS BASE HR: 98
DIASTOLIC BLOOD PRESSURE: 87
DOP CALC LVOT AREA: 2.92 CM2
DOP CALC LVOT DIAMETER: 1.93 CM
DOP CALC LVOT STROKE VOLUME: 48.71 CM3
DOP CALCLVOT PEAK VEL VTI: 16.66 CM
E WAVE DECELERATION TIME: 118.15 MSEC
E/A RATIO: 0.57
E/E' RATIO: 14.44
ECHO LV POSTERIOR WALL: 1.06 CM (ref 0.6–1.1)
FRACTIONAL SHORTENING: 30 % (ref 28–44)
INTERVENTRICULAR SEPTUM: 1.2 CM (ref 0.6–1.1)
LA MAJOR: 5.01 CM
LA MINOR: 5 CM
LA WIDTH: 3.64 CM
LEFT ATRIUM SIZE: 4.4 CM
LEFT ATRIUM VOLUME INDEX: 33.7 ML/M2
LEFT ATRIUM VOLUME: 68.14 CM3
LEFT INTERNAL DIMENSION IN SYSTOLE: 2.98 CM (ref 2.1–4)
LEFT VENTRICLE DIASTOLIC VOLUME INDEX: 40.26 ML/M2
LEFT VENTRICLE DIASTOLIC VOLUME: 81.38 ML
LEFT VENTRICLE MASS INDEX: 82.6 G/M2
LEFT VENTRICLE SYSTOLIC VOLUME INDEX: 17 ML/M2
LEFT VENTRICLE SYSTOLIC VOLUME: 34.34 ML
LEFT VENTRICULAR INTERNAL DIMENSION IN DIASTOLE: 4.26 CM (ref 3.5–6)
LEFT VENTRICULAR MASS: 166.88 G
LV LATERAL E/E' RATIO: 13
LV SEPTAL E/E' RATIO: 16.25
MV PEAK A VEL: 1.15 M/S
MV PEAK E VEL: 0.65 M/S
OHS CV CPX 1 MINUTE RECOVERY HEART RATE: 104 BPM
OHS CV CPX 85 PERCENT MAX PREDICTED HEART RATE MALE: 122
OHS CV CPX ESTIMATED METS: 4
OHS CV CPX MAX PREDICTED HEART RATE: 144
OHS CV CPX PATIENT IS FEMALE: 1
OHS CV CPX PATIENT IS MALE: 0
OHS CV CPX PEAK DIASTOLIC BLOOD PRESSURE: 97 MMHG
OHS CV CPX PEAK HEAR RATE: 130
OHS CV CPX PEAK RATE PRESSURE PRODUCT: ABNORMAL
OHS CV CPX PEAK SYSTOLIC BLOOD PRESSURE: 184
OHS CV CPX PERCENT MAX PREDICTED HEART RATE ACHIEVED: 91
OHS CV CPX PERCENT TARGET HEART RATE ACHIEVED: 106.56
OHS CV CPX RATE PRESSURE PRODUCT PRESENTING: ABNORMAL
OHS CV CPX TARGET HEART RATE: 122
PULM VEIN S/D RATIO: 0.9
PV PEAK D VEL: 0.51 M/S
PV PEAK S VEL: 0.46 M/S
RA MAJOR: 4.59 CM
RA WIDTH: 3.16 CM
RIGHT VENTRICULAR END-DIASTOLIC DIMENSION: 3.4 CM
RV TISSUE DOPPLER FREE WALL SYSTOLIC VELOCITY 1 (APICAL 4 CHAMBER VIEW): 15.46 M/S
SINUS: 2.86 CM
STJ: 2.44 CM
STRESS ECHO POST EXERCISE DUR MIN: 6 MIN
STRESS ECHO POST EXERCISE DUR SEC: 26
SYSTOLIC BLOOD PRESSURE: 159
TDI LATERAL: 0.05
TDI SEPTAL: 0.04
TDI: 0.05
TRICUSPID ANNULAR PLANE SYSTOLIC EXCURSION: 1.88 CM

## 2018-12-13 PROCEDURE — 76770 US EXAM ABDO BACK WALL COMP: CPT | Mod: 26,,, | Performed by: RADIOLOGY

## 2018-12-13 PROCEDURE — 77067 SCR MAMMO BI INCL CAD: CPT | Mod: TC

## 2018-12-13 PROCEDURE — 93351 STRESS TTE COMPLETE: CPT | Mod: S$GLB,,, | Performed by: INTERNAL MEDICINE

## 2018-12-13 PROCEDURE — 77067 SCR MAMMO BI INCL CAD: CPT | Mod: 26,,, | Performed by: RADIOLOGY

## 2018-12-13 PROCEDURE — 76770 US EXAM ABDO BACK WALL COMP: CPT | Mod: TC

## 2018-12-13 NOTE — PROGRESS NOTES
HPI     Patient in for progress check.    Patient states AGUSTIN is doing better. Bump has gone down. No pain    Being followed for (diagnosis): chalazion AGUSTIN      Date last seen:  12/03/2018    Doctor last seen:  Dr. Pelayo     Prescribed eye medications(s) using: Doxycycline 100 mg once daily    OTC eye medication(s) using:  warm compresses/Maxitrol ana    Signs/symptoms of condition resolved/better/stable/worse?:  Better     Allergies/Medications reviewed today?  Yes           Last edited by Chava Camacho MA on 12/12/2018  3:18 PM. (History)            Assessment /Plan     For exam results, see Encounter Report.    1. Chalazion of left upper eyelid     2. Meibomian gland dysfunction (MGD) of both eyes     3. Nuclear sclerosis, bilateral                    Ms. Romero in today for follow-up on chalazion of the left upper eyelid.  Chalazion decreased in size, without tenderness on palpation..  Okay to discontinue oitnment.  Decrease doxycycline to 50 mg (one-half of 100 mg tablet) per day until out.  Continue regular and diligent use of warm compresses to left upper eyelid   Return if any worsening or signs/symptoms.  Otherwise, return when appropriate for general eye examination
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
## 2018-12-19 ENCOUNTER — HOSPITAL ENCOUNTER (OUTPATIENT)
Dept: RADIOLOGY | Facility: HOSPITAL | Age: 73
Discharge: HOME | End: 2018-12-19
Attending: PSYCHIATRY & NEUROLOGY
Payer: MEDICARE

## 2018-12-19 DIAGNOSIS — Z86.73 HISTORY OF TIA (TRANSIENT ISCHEMIC ATTACK): ICD-10-CM

## 2018-12-19 PROCEDURE — 70551 MRI BRAIN STEM W/O DYE: CPT | Mod: TC

## 2018-12-19 PROCEDURE — 70551 MRI BRAIN STEM W/O DYE: CPT | Mod: 26,,, | Performed by: RADIOLOGY

## 2018-12-19 PROCEDURE — 70498 CT ANGIOGRAPHY NECK: CPT | Mod: TC

## 2018-12-19 PROCEDURE — 70496 CT ANGIOGRAPHY HEAD: CPT | Mod: TC

## 2018-12-19 PROCEDURE — 70496 CT ANGIOGRAPHY HEAD: CPT | Mod: 26,,, | Performed by: RADIOLOGY

## 2018-12-19 PROCEDURE — 25500020 PHARM REV CODE 255: Performed by: PSYCHIATRY & NEUROLOGY

## 2018-12-19 PROCEDURE — 70498 CT ANGIOGRAPHY NECK: CPT | Mod: 26,,, | Performed by: RADIOLOGY

## 2018-12-19 RX ADMIN — IOHEXOL 100 ML: 350 INJECTION, SOLUTION INTRAVENOUS at 04:12

## 2018-12-22 ENCOUNTER — PATIENT MESSAGE (OUTPATIENT)
Dept: NEUROLOGY | Facility: CLINIC | Age: 73
End: 2018-12-22

## 2018-12-28 ENCOUNTER — CLINICAL SUPPORT (OUTPATIENT)
Dept: CARDIOLOGY | Facility: HOSPITAL | Age: 73
End: 2018-12-28
Attending: PSYCHIATRY & NEUROLOGY
Payer: MEDICARE

## 2018-12-28 DIAGNOSIS — G45.9 TRANSIENT CEREBRAL ISCHEMIA, UNSPECIFIED TYPE: ICD-10-CM

## 2018-12-28 DIAGNOSIS — Z86.73 HISTORY OF TIA (TRANSIENT ISCHEMIC ATTACK): ICD-10-CM

## 2018-12-28 PROCEDURE — 93271 ECG/MONITORING AND ANALYSIS: CPT

## 2018-12-31 ENCOUNTER — OFFICE VISIT (OUTPATIENT)
Dept: INTERNAL MEDICINE | Facility: CLINIC | Age: 73
End: 2018-12-31
Payer: MEDICARE

## 2018-12-31 VITALS
OXYGEN SATURATION: 95 % | SYSTOLIC BLOOD PRESSURE: 128 MMHG | HEART RATE: 95 BPM | HEIGHT: 62 IN | BODY MASS INDEX: 40.37 KG/M2 | DIASTOLIC BLOOD PRESSURE: 64 MMHG | WEIGHT: 219.38 LBS

## 2018-12-31 DIAGNOSIS — T22.152A: Primary | ICD-10-CM

## 2018-12-31 DIAGNOSIS — E11.8 TYPE 2 DIABETES MELLITUS WITH COMPLICATION, WITHOUT LONG-TERM CURRENT USE OF INSULIN: ICD-10-CM

## 2018-12-31 PROCEDURE — 1101F PT FALLS ASSESS-DOCD LE1/YR: CPT | Mod: CPTII,S$GLB,, | Performed by: PHYSICIAN ASSISTANT

## 2018-12-31 PROCEDURE — 3046F HEMOGLOBIN A1C LEVEL >9.0%: CPT | Mod: CPTII,S$GLB,, | Performed by: PHYSICIAN ASSISTANT

## 2018-12-31 PROCEDURE — 3074F SYST BP LT 130 MM HG: CPT | Mod: CPTII,S$GLB,, | Performed by: PHYSICIAN ASSISTANT

## 2018-12-31 PROCEDURE — 99212 OFFICE O/P EST SF 10 MIN: CPT | Mod: S$GLB,,, | Performed by: PHYSICIAN ASSISTANT

## 2018-12-31 PROCEDURE — 99999 PR PBB SHADOW E&M-EST. PATIENT-LVL III: CPT | Mod: PBBFAC,,, | Performed by: PHYSICIAN ASSISTANT

## 2018-12-31 PROCEDURE — 3078F DIAST BP <80 MM HG: CPT | Mod: CPTII,S$GLB,, | Performed by: PHYSICIAN ASSISTANT

## 2018-12-31 PROCEDURE — 99499 UNLISTED E&M SERVICE: CPT | Mod: S$GLB,,, | Performed by: PHYSICIAN ASSISTANT

## 2018-12-31 RX ORDER — MUPIROCIN 20 MG/G
OINTMENT TOPICAL 3 TIMES DAILY
Qty: 30 G | Refills: 0 | Status: SHIPPED | OUTPATIENT
Start: 2018-12-31 | End: 2019-05-16 | Stop reason: ALTCHOICE
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
## 2018-12-31 NOTE — PATIENT INSTRUCTIONS
First-Degree Burn  A burn occurs when skin is exposed to too much heat, sun, or harsh chemicals. A first-degree burn (superficial burn) causes mainly redness. It heals in a few days.  Home care  Follow these guidelines when caring for yourself at home:  · Use pain medicine as directed. You may use over-the-counter medicine to control pain if no pain medicine was prescribed. If you have chronic liver or kidney disease, talk with your healthcare provider before using acetaminophen or ibuprofen. Also talk with your provider if you've had a stomach ulcer or GI bleeding.  · On the first day, you may put a cool compress on the burn to relieve pain. You can use a small towel soaked in cool water as a cool compress.  · Numbing medicines for sunburn can be used for temporary relief of the burning feeling. These medicines include lidocaine or benzocaine. You dont need a prescription for them.  · Moisturizers with aloe vera can help soothe the burn.  · Don't pick or scratch at the affected areas. Use over-the-counter medicines like diphenhydramine for itching. This medicine is taken by mouth.  · Since you don't have an open wound, you don't need to use antibiotic cream or ointment. Sometimes an infection may occur even with proper treatment. Be sure to check the burn daily for the signs of infection listed below.  · Wear a hat, sunscreen, and long sleeves while in the sun.  · Wear loose-fitting clothing until the burn heals.  Follow-up care  Follow up with your healthcare provider, or as advised. Most first-degree burns heal well without complications.  When to seek medical advice  Call your healthcare provider right away if any of these signs of infection occur:  · Fever of 100.4°F (38°C) or higher, or as directed by your healthcare provider  · Pain gets worse  · Redness or swelling gets worse  · Pus comes from the burn  · Red streaks in your skin come from the burn  · Wound doesn't appear to be healing  Date Last  Reviewed: 12/1/2016  © 1348-4273 [employer]. [address] [place] [address], [place] [address]. All rights reserved. This information is not intended as a substitute for professional medical care. Always follow your healthcare professional's instructions.

## 2019-01-04 ENCOUNTER — PATIENT MESSAGE (OUTPATIENT)
Dept: INTERNAL MEDICINE | Facility: CLINIC | Age: 74
End: 2019-01-04

## 2019-01-04 ENCOUNTER — TELEPHONE (OUTPATIENT)
Dept: INTERNAL MEDICINE | Facility: CLINIC | Age: 74
End: 2019-01-04

## 2019-01-04 NOTE — TELEPHONE ENCOUNTER
Pt called today with a few concerns   iJardiance is not covered by insurance due to side effects of medication requesting alternative  med  Pt state her blood glucose levels have been reading between 160-199 nothing over 200   Her BP reading has been high as 153/125 nothing lower than 154/106  Pt's heart rate is ranging between .  Her diet has been mostly vegetables and less potatoes and pasta pt state she did have beets one day which she did not know they had a lot of sugar in them   Pt is requesting to see a Nutritionist to get better food options to help with diabetes  Pt do have a appt with Endocrine on 1/11

## 2019-01-04 NOTE — TELEPHONE ENCOUNTER
----- Message from Paula Bacon sent at 1/4/2019  1:20 PM CST -----  Contact: Patient   Patient needs a referral to see a nutritionist to get her diabetes under control    Callback: [phone]    Thank you

## 2019-01-06 ENCOUNTER — PATIENT MESSAGE (OUTPATIENT)
Dept: ORTHOPEDICS | Facility: CLINIC | Age: 74
End: 2019-01-06

## 2019-01-07 DIAGNOSIS — M75.42 SHOULDER IMPINGEMENT SYNDROME, LEFT: Primary | ICD-10-CM

## 2019-01-07 DIAGNOSIS — M25.512 CHRONIC LEFT SHOULDER PAIN: ICD-10-CM

## 2019-01-07 DIAGNOSIS — G89.29 CHRONIC LEFT SHOULDER PAIN: ICD-10-CM
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
## 2019-01-11 ENCOUNTER — PATIENT MESSAGE (OUTPATIENT)
Dept: INTERNAL MEDICINE | Facility: CLINIC | Age: 74
End: 2019-01-11

## 2019-01-11 ENCOUNTER — OFFICE VISIT (OUTPATIENT)
Dept: ENDOCRINOLOGY | Facility: CLINIC | Age: 74
End: 2019-01-11
Payer: MEDICARE

## 2019-01-11 VITALS
BODY MASS INDEX: 40.33 KG/M2 | SYSTOLIC BLOOD PRESSURE: 120 MMHG | RESPIRATION RATE: 16 BRPM | HEIGHT: 62 IN | DIASTOLIC BLOOD PRESSURE: 78 MMHG | WEIGHT: 219.13 LBS | HEART RATE: 68 BPM

## 2019-01-11 DIAGNOSIS — E11.8 TYPE 2 DIABETES MELLITUS WITH COMPLICATION, WITHOUT LONG-TERM CURRENT USE OF INSULIN: ICD-10-CM

## 2019-01-11 DIAGNOSIS — E55.9 VITAMIN D DEFICIENCY: ICD-10-CM

## 2019-01-11 DIAGNOSIS — N18.30 CKD (CHRONIC KIDNEY DISEASE) STAGE 3, GFR 30-59 ML/MIN: ICD-10-CM

## 2019-01-11 DIAGNOSIS — E78.2 MIXED HYPERLIPIDEMIA: ICD-10-CM

## 2019-01-11 DIAGNOSIS — I50.30 HYPERTENSIVE HEART DISEASE WITH DIASTOLIC HEART FAILURE: ICD-10-CM

## 2019-01-11 DIAGNOSIS — E11.8 TYPE 2 DIABETES MELLITUS WITH COMPLICATION, WITHOUT LONG-TERM CURRENT USE OF INSULIN: Primary | ICD-10-CM

## 2019-01-11 DIAGNOSIS — M81.0 AGE-RELATED OSTEOPOROSIS WITHOUT CURRENT PATHOLOGICAL FRACTURE: ICD-10-CM

## 2019-01-11 DIAGNOSIS — I11.0 HYPERTENSIVE HEART DISEASE WITH DIASTOLIC HEART FAILURE: ICD-10-CM

## 2019-01-11 DIAGNOSIS — I50.30 HYPERTENSIVE HEART DISEASE WITH DIASTOLIC HEART FAILURE: Primary | ICD-10-CM

## 2019-01-11 DIAGNOSIS — I11.0 HYPERTENSIVE HEART DISEASE WITH DIASTOLIC HEART FAILURE: Primary | ICD-10-CM

## 2019-01-11 PROCEDURE — 3078F PR MOST RECENT DIASTOLIC BLOOD PRESSURE < 80 MM HG: ICD-10-PCS | Mod: CPTII,S$GLB,, | Performed by: NURSE PRACTITIONER

## 2019-01-11 PROCEDURE — 3046F PR MOST RECENT HEMOGLOBIN A1C LEVEL > 9.0%: ICD-10-PCS | Mod: CPTII,S$GLB,, | Performed by: NURSE PRACTITIONER

## 2019-01-11 PROCEDURE — 3078F DIAST BP <80 MM HG: CPT | Mod: CPTII,S$GLB,, | Performed by: NURSE PRACTITIONER

## 2019-01-11 PROCEDURE — 99214 PR OFFICE/OUTPT VISIT, EST, LEVL IV, 30-39 MIN: ICD-10-PCS | Mod: S$GLB,,, | Performed by: NURSE PRACTITIONER

## 2019-01-11 PROCEDURE — 3074F SYST BP LT 130 MM HG: CPT | Mod: CPTII,S$GLB,, | Performed by: NURSE PRACTITIONER

## 2019-01-11 PROCEDURE — 99214 OFFICE O/P EST MOD 30 MIN: CPT | Mod: S$GLB,,, | Performed by: NURSE PRACTITIONER

## 2019-01-11 PROCEDURE — 99499 RISK ADDL DX/OHS AUDIT: ICD-10-PCS | Mod: S$GLB,,, | Performed by: NURSE PRACTITIONER

## 2019-01-11 PROCEDURE — 99499 UNLISTED E&M SERVICE: CPT | Mod: S$GLB,,, | Performed by: NURSE PRACTITIONER

## 2019-01-11 PROCEDURE — 3046F HEMOGLOBIN A1C LEVEL >9.0%: CPT | Mod: CPTII,S$GLB,, | Performed by: NURSE PRACTITIONER

## 2019-01-11 PROCEDURE — 1101F PT FALLS ASSESS-DOCD LE1/YR: CPT | Mod: CPTII,S$GLB,, | Performed by: NURSE PRACTITIONER

## 2019-01-11 PROCEDURE — 1101F PR PT FALLS ASSESS DOC 0-1 FALLS W/OUT INJ PAST YR: ICD-10-PCS | Mod: CPTII,S$GLB,, | Performed by: NURSE PRACTITIONER

## 2019-01-11 PROCEDURE — 99999 PR PBB SHADOW E&M-EST. PATIENT-LVL III: ICD-10-PCS | Mod: PBBFAC,,, | Performed by: NURSE PRACTITIONER

## 2019-01-11 PROCEDURE — 99999 PR PBB SHADOW E&M-EST. PATIENT-LVL III: CPT | Mod: PBBFAC,,, | Performed by: NURSE PRACTITIONER

## 2019-01-11 PROCEDURE — 3074F PR MOST RECENT SYSTOLIC BLOOD PRESSURE < 130 MM HG: ICD-10-PCS | Mod: CPTII,S$GLB,, | Performed by: NURSE PRACTITIONER

## 2019-01-11 RX ORDER — GLIPIZIDE 10 MG/1
10 TABLET, FILM COATED, EXTENDED RELEASE ORAL
Qty: 30 TABLET | Refills: 11 | Status: SHIPPED | OUTPATIENT
Start: 2019-01-11 | End: 2019-04-28 | Stop reason: DRUGHIGH

## 2019-01-11 NOTE — ASSESSMENT & PLAN NOTE
-- bmd due in 3/2019  -- discussed previous bmd and the pt would like to wait until march bmd to decide any treatment decisions  -- fall precaution emphasized  -- start Vit D & calcium supplements

## 2019-01-11 NOTE — ASSESSMENT & PLAN NOTE
-- Last GFR >60, repeat CMP prior to next visit.  -- May restart metformin  if kidney function stable at next visit

## 2019-01-11 NOTE — ASSESSMENT & PLAN NOTE
-- Reviewed goals of therapy are to get the best control we can without hypoglycemia  Medication changes:   Continue  trulicity once weekly  Start: Glipizide 10 mg XR with 1st meal of the day. Discussed risk of hypoglycemia.  -- discussed that our options are metformin (once kidney function is good for 2 CMPs) or insulin. She verbalized understanding     -- Advised frequent self blood glucose monitoring.  Patient encouraged to document glucose results and bring them to every clinic visit    -- Hypoglycemia precautions discussed. Instructed on precautions before driving.    -- Call for Bg repeatedly < 90 or > 180.   -- Close adherence to lifestyle changes recommended.   -- Periodic follow ups for eye evaluations, foot care and dental care suggested.

## 2019-01-11 NOTE — PROGRESS NOTES
Patient, Kaylee Romero (MRN #[id number]), presented with a recorded BMI of 40.08 kg/m^2 consistent with the definition of morbid obesity (ICD-10 E66.01). The patient's morbid obesity was monitored, evaluated, addressed and/or treated. This addendum to the medical record is made on 01/11/2019.

## 2019-01-14 ENCOUNTER — PATIENT OUTREACH (OUTPATIENT)
Dept: OTHER | Facility: OTHER | Age: 74
End: 2019-01-14

## 2019-01-14 NOTE — LETTER
Claudia Shields PA-C  [address] [place] [address]     Dear Kaylee Romero,    Welcome to the [facility]!           My name is Claudia Shields PA-C and I am your dedicated Digital Medicine clinician.  As an expert in medication management, I will help ensure that the medications you are taking continue to provide you with the intended benefits.        I am David Wills and I will be your health  for the duration of the program.  My  job is to help you identify lifestyle changes to improve your blood pressure control.  We will talk about nutrition, exercise, and other ways that you may be able to adjust your current habits to better your health. Together, we will work to improve your overall health and encourage you to meet your goals for a healthier lifestyle.    What we expect from YOU:    You will need to take blood pressure readings multiple times a week and no less than one reading per week.   It is important that you take your measurements at different times during the day, when possible.     What you should expect from your Digital Medicine Care Team:   We will provide you with education about high blood pressure, including lifestyle changes that could help you to control your blood pressure.   We will review your weekly readings and provide you with monthly blood pressure progress reports after you have been in the program for more than 30 days.   We will send monthly progress reports on your blood pressure control to your physician so they can follow along with your progress as well.    You will be able to reach me by phone at [phone] or through your [facility] account by clicking my name under Care Team on the right side of the home screen.    I look forward to working with you to achieve your blood pressure goals!    Sincerely,  Claudia Shields PA-C  Your personal clinician    Please visit  [email or web address] to learn more about high blood pressure and what you can do lower your blood pressure.                                                                                           Kaylee Romero  [address] [facility] [address]  [place] [address]

## 2019-01-14 NOTE — PROGRESS NOTES
Digital Medicine Enrollment Call    Introduced Mrs. Kaylee Romero to Digital Medicine.     Discussed program expectations and requirements.    Introduced digital medicine care team.     Reviewed the importance of self-monitoring for digital medicine participation.     Reviewed that the Digital Medicine team is not available for emergencies and instructed the patient to call 911 or [facility] ([phone] or [phone]) if one arises.              Last 5 Patient Entered Readings                                      Current 30 Day Average: 134/90     Recent Readings 1/13/2019 1/13/2019 1/13/2019 1/13/2019 1/13/2019    SBP (mmHg) 144 137 149 149 116    DBP (mmHg) 95 90 92 92 90    Pulse 108 107 108 108 88

## 2019-01-18 ENCOUNTER — TELEPHONE (OUTPATIENT)
Dept: CARDIOLOGY | Facility: HOSPITAL | Age: 74
End: 2019-01-18

## 2019-01-18 NOTE — TELEPHONE ENCOUNTER
Spoke with patient. She wanted to make sure her event monitor was connected and working. I informed her that it is. She stated understanding.   ----- Message from Fozia Rosenbaum sent at 1/18/2019  9:17 AM CST -----  Contact: pt   Pt would like a call she has questions in ref to her device    Thanks

## 2019-01-21 ENCOUNTER — PATIENT OUTREACH (OUTPATIENT)
Dept: OTHER | Facility: OTHER | Age: 74
End: 2019-01-21

## 2019-01-21 ENCOUNTER — CLINICAL SUPPORT (OUTPATIENT)
Dept: REHABILITATION | Facility: HOSPITAL | Age: 74
End: 2019-01-21
Payer: MEDICARE

## 2019-01-21 ENCOUNTER — TELEPHONE (OUTPATIENT)
Dept: INTERNAL MEDICINE | Facility: CLINIC | Age: 74
End: 2019-01-21

## 2019-01-21 DIAGNOSIS — M75.42 SHOULDER IMPINGEMENT SYNDROME, LEFT: ICD-10-CM

## 2019-01-21 DIAGNOSIS — G89.29 CHRONIC LEFT SHOULDER PAIN: ICD-10-CM

## 2019-01-21 DIAGNOSIS — M25.512 CHRONIC LEFT SHOULDER PAIN: ICD-10-CM

## 2019-01-21 PROCEDURE — 97161 PT EVAL LOW COMPLEX 20 MIN: CPT | Mod: PO

## 2019-01-21 PROCEDURE — 97110 THERAPEUTIC EXERCISES: CPT | Mod: PO

## 2019-01-21 NOTE — PROGRESS NOTES
Last 5 Patient Entered Readings                                      Current 30 Day Average: 158/96     Recent Readings 1/19/2019 1/19/2019 1/19/2019 1/18/2019 1/18/2019    SBP (mmHg) 155 144 153 161 176    DBP (mmHg) 88 97 99 105 95    Pulse 106 104 105 101 103          Digital Medicine: Health  Introduction    Introduced Mrs. Kaylee Romero to Digital Medicine. Discussed health  role and recommended lifestyle modifications.        Lifestyle Assessment:    Current Dietary Habits(i.e. low sodium, food labels, dining out): Patient states she is starting to eat more vegetables like celery and broccoli.  States she does not usually cook at home.  States she usually just eats 1 meal a day.  States she snacks on nuts or cheese or cranberries.  Informed patient to aim for low sodium snack options, like unsalted nuts.  Offered low sodium snack options and patient agreed.  Sent to email.     Exercise: Patient states she had a TIA in November, so states she is still unsure if she is able to exercise.  States she has a treadmill in her house.  Had a fall in September on her left shoulder.  States she is starting PT back up today for shoulder.  Reports her BP was too high last time she tried PT, so was unable to complete it.    Alcohol/Tobacco: deferred    Medication Adherence: has been compliant with the medicaiton regimen     Other goals: deferred    Reviewed AHA/AACE recommendations:  Limit sodium intake to <2000mg/day  Recommended CHO intake, 45-65% of daily caloric intake  Perform 150 minutes of physical activity per week    Reviewed the importance of self-monitoring, medication adherence, and that the health  can be used as a resource for lifestyle modifications to help reduce or maintain a healthy lifestyle.  Reviewed that the Digital Medicine team is not available for emergencies and instructed the patient to call 911 or [facility] ([phone] or [phone]) if one arises.    Patient  states she enrolled in the Diabetes Digital Medicine Program and has been sending readings.  Patient does not have questionnaire for Diabetes.  Will message PCP about questionnaire.

## 2019-01-21 NOTE — TELEPHONE ENCOUNTER
----- Message from David Wills sent at 1/21/2019 11:47 AM CST -----  Regarding: Patient Digital Medicine Questionnaire  Dr. Roberts,    Your patient, Mrs. Kaylee Romero, states she completed the questionnaire for the Diabetes Digital Medicine Program.  I do not see that she was sent a questionnaire for this program, but she is enrolled in the HTN program.    She asked if you could send her a questionnaire for the Diabetes program because she has been trying to send readings.    Please let me know if you have any questions or concerns.    Thanks!  David Wills, MPH  Health   [phone]

## 2019-01-21 NOTE — PROGRESS NOTES
"[facility]  Physical Therapy Initial Evaluation    Name: Kaylee Romero  Clinic Number: [id number]    Therapy Diagnosis: No diagnosis found.  Physician: Elvia Whitehead PA    Physician Orders: PT Eval and Treat L shoulder pain  Medical Diagnosis from Referral: R shoulder impingement  Evaluation Date: 1/21/2019  Authorization Period Expiration: 1/7/20  Plan of Care Expiration: 4/21/19  Visit # / Visits authorized: 1/ 1    Time In: 3:00  Time Out: 4:00  Total Billable Time: 60 minutes    Precautions: Standard, TIA 11/4/18, DM II    Subjective   Date of onset: 9/21/18  History of current condition - Kaylee reports her L shoulder has been hurting her after she lost her footing descending stairs however she fell on railings . Pt reports she has had 4 falls since August 1 mechanical from standing and 3 secondary to wheeled chairs slipping out from under her.  Pt reports she prefers to scoot around her house in wheeled chair to decrease risk of falls. Pt reports initially after the fall the pain used to wake her up at night, but now it doesn't, however pt reports she still has limited range of motion and mobility. Pt reports she has difficulty don/doffing bra strap, necklace and shirt. Pt requires help to wash her back, can't reach things above shoulder height and she is unable to hold anything over 5# (unable to carry gallon of milk w/ L UE). Pt has hx of HTN, reports inconsistency with taking medication because she believes some medications make her BP higher (reports her systolic BP is usually in the 150's at its lowest; hx of chest "pressure" on exertion but no chest pain). Pt reports last Sunday she experienced a TIA; reports she was babbling, unable to put words together to form a sentence and she experienced bilateral hand weakness/ uncoordination while typing, pt reports she has residual word finding issues and feels a little more unsteady on her feet than PLOF (during subjective " unable to notice any impairments in speech). Pt reports she is following up with cardiologist in regards to recent TIA. Pt would like to return to her walking program, restore mobility in her shoulder and be prepared for a potential trip to [place] in 2019.    SOB during a period of inactivity for about 3 weeks ago while being monitored during   Heavy door , picking up gallon of milk to put in fridge      Past Medical History:   Diagnosis Date    Acid reflux     Arthritis     Cataract     CKD (chronic kidney disease) stage 3, GFR 30-59 ml/min     Diabetes mellitus, type 2     Dry mouth     Hyperlipidemia 2014    Hypertension     KAMRAN (obstructive sleep apnea)     Osteoporosis      Kaylee Romero  has a past surgical history that includes  section; ankle surgery (l); Appendectomy; Knee arthroscopy w/ debridement; Dilation and curettage of uterus; and Knee surgery (Left, 2017).    Kaylee has a current medication list which includes the following prescription(s): alcohol swabs, amlodipine, aspirin, blood sugar diagnostic, blood-glucose meter, dulaglutide, glipizide, hydrochlorothiazide, lancets, mupirocin, simvastatin, and valsartan.    Review of patient's allergies indicates:   Allergen Reactions    Keflex [cephalexin] Other (See Comments)     Turns orange    Bactrim [sulfamethoxazole-trimethoprim]      Generic version  Sulfa makes her sick         Imaging, X-ray: 10/4/18   COMPARISON: 2018  FINDINGS:  Mild degenerative changes at the acromioclavicular joint can be seen.  No soft tissue calcification is identified.  Bony structures are intact.     Prior Therapy: s/p TKA and R shoulder pain, pt reports good results  Social History: 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  Prior Level of Function: Pt independent with all  ADLs, leading international educational trips and walking without difficulty  Current Level of Function: Pt relies on  to help with dressing, pt is unable to perform overhead/reaching tasks and pt tolerates minimal walking (prefers to scoot around house in wheeled chairs; has had falls doing this)     Pain:  Current 3/10, worst 9/10, best 2/10   Location: left shoulder   Description: Aching, Dull, Burning, Throbbing, Sharp, Electric, Shooting and Variable  Aggravating Factors: Touching, Night Time, Morning, Extension, Flexing, Lifting and Getting out of bed/chair  Easing Factors: relaxation, pain medication, ice, hot bath and rest     Pts goals: 2 miles a day, reduced pain in the shoulder, increase ROM    Objective     BP taken pre- evaluation: 144/92 in L UE while sitting, 68 BPM     R shoulder AROM/PROM WNL and pain free  AROM Left Comment Normal   Shoulder Flexion: 140 degrees  P! 180 deg   Shoulder Extension: 20 degrees P! 60 deg   Shoulder Abduction: 130 degrees P! 180 deg   Shoulder ER: @90 38  degrees P! 90 deg   Shoulder IR: @90 60 degrees P! 90 deg   PROM Left Comment Normal   Shoulder Flexion: 75 degrees  P! (get to 140 if use abduction arc 180 deg   Shoulder Abduction: 140 degrees  P! 180 deg   Shoulder ER, 90°ABD: 70 degrees  P!  90 deg   Shoulder IR, 90° ABD: 60  degrees  P! 90 deg   *denotes pain     MMT    Right Left Comment   Shoulder flexion: 4+/5 3+/5  P! On L   Shoulder Abduction: 4+/5 3+/5  P! On L   Shoulder ER: 4+/5 3+/5  P! On L   Shoulder IR: 4+/5 4-/5        Special Tests     - Neers: left positive  - Arzate-Henry: left positive  - Lift-off: left positive  - Drop Arm: left positive  - Full/Empty Can: left positive  - Biceps Load I/II: left positive  - Scour left negative    CMS Impairment/Limitation/Restriction for FOTO Shoulder Survey    Therapist reviewed FOTO scores for Kaylee Romero on 1/21/2019.   FOTO documents entered into [employer] - see Media section.    Limitation  Score: 53%  Category: Mobility    Current : CK = at least 40% but < 60% impaired, limited or restricted  Goal: CJ = at least 20% but < 40% impaired, limited or restricted         TREATMENT   Treatment Time In: 3:40  Treatment Time Out: 4:00  Total Treatment time separate from Evaluation: 20 minutes    Kaylee received therapeutic exercises to develop strength, endurance, ROM, flexibility and posture for 15 minutes including:    Dowel OH flexion 2x30  Isometric brueggars 3 sec holds 2x10 (can use pillow case or dowel)  Scap retraction/ depression 3 sec holds 10x10    Kaylee received the following manual therapy techniques: Joint mobilizations, Manual traction, Myofacial release and Soft tissue Mobilization were applied to the: L shoulder for 5 minutes, including:    STM to pec minor, pec major, deltoid  Post GH glide Gr II-III  Inf GH glide Gr III-IV    Kaylee received cold pack for inflammation control/pain modulation to the L shoulder minutes to 10.    Home Exercises and Patient Education Provided    Education provided re: posture, activity modification, importance of regular physical activity    Written Home Exercises Provided: All exercises performed during today's treatment were printed and given to pt.  Exercises were reviewed and Kaylee was able to demonstrate them prior to the end of the session.   Pt received a written copy of exercises to perform at home. Kaylee demonstrated good  understanding of the education provided.     Assessment   Kaylee is a 74 y.o. female referred to outpatient Physical Therapy with a medical diagnosis of L shoulder pain. Pt presents with decreased strength, decreased ROM, decreased flexibility, faulty posture, and increased pain. Due to impairments, pt is unable to perform ADLs at [facility], participate in regular physical activity and is limited in ability to reach for objects.     Pt prognosis is Good.   Pt will benefit from skilled outpatient Physical Therapy to address the deficits stated  above and in the chart below, provide pt/family education, and to maximize pt's level of independence.     Plan of care discussed with patient: Yes  Pt's spiritual, cultural and educational needs considered and patient is agreeable to the plan of care and goals as stated below:     Anticipated Barriers for therapy: HEP compliance, uncontrolled HTN  Medical Necessity is demonstrated by the following  History  Co-morbidities and personal factors that may impact the plan of care Co-morbidities:   anxiety, CKD stage 3, diabetes, high BMI, history of CVA, HTN, level of undertstanding of current condition and poor medication/medical compliance     Personal Factors:   lifestyle  attitudes       moderate   Examination  Body Structures and Functions, activity limitations and participation restrictions that may impact the plan of care Body Regions:   upper extremities     Body Systems:    ROM  strength  balance  gait  transfers     Participation Restrictions:   International educational excursions, recreational physical activity     Activity limitations:   Learning and applying knowledge  no deficits     General Tasks and Commands  no deficits     Communication  no deficits     Mobility  lifting and carrying objects  walking  fine hand use (grasping/picking up)  moving around using equipment (WC)  using transportation (bus, train, plane, car)  driving (bike, car, motorcycle)     Self care  washing oneself (bathing, drying, washing hands)  caring for body parts (brushing teeth, shaving, grooming)  toileting  dressing  looking after one's health     Domestic Life  shopping  cooking  doing house work (cleaning house, washing dishes, laundry)     Interactions/Relationships  no deficits     Life Areas  no deficits     Community and Social Life  no deficits             moderate   Clinical Presentation evolving clinical presentation with changing clinical characteristics moderate   Decision Making/ Complexity Score: moderate       Goals:  Short Term Goals: 3 weeks   1. Pt will be independent with HEP, demonstrate good form and report participating in walking program for at least 15 minutes/day 5 days/week  2. Pt will demonstrate pain free PROM of the L shoulder of at least 140 deg flexion, 110 deg abduction and 50 degrees of ER/IR @90 deg abduction to demonstrate improved functional mobility  3. Pt will report good compliance with prescribed medications, will report drinking at least 60 ounces of water/day and will report one healthy dietary change in order to improve tolerance to physical activity and reduce the risk of chest pain on exertion    Long Term Goals: 10 weeks   1. Pt will demonstrate full/symmetrical AROM to bilat shoulders reporting no pain at end range to demonstrate improved functional mobility  2. Pt will be able to independently don/doff shirt/ bra strap without reports of pain to maximize pt's independence   3. Pt will be able to lift 5# overhead 5x and be able to carry 15# 25ft with L UE reporting less than 2/10 pain in order to demonstrate improved shoulder strength and function.     Plan   Plan of care Certification: 1/21/19 to 4/21/19     Outpatient Physical Therapy 2 times weekly for 12 weeks to include the following interventions: Cervical/Lumbar Traction, Electrical Stimulation TENS, Manual Therapy, Moist Heat/ Ice, Neuromuscular Re-ed, Patient Education, Self Care, Therapeutic Activites, Therapeutic Exercise and Ultrasound.        Leonard Germain, PT

## 2019-01-28 ENCOUNTER — PATIENT OUTREACH (OUTPATIENT)
Dept: OTHER | Facility: OTHER | Age: 74
End: 2019-01-28

## 2019-01-28 DIAGNOSIS — I11.0 HYPERTENSIVE HEART DISEASE WITH DIASTOLIC HEART FAILURE: Primary | ICD-10-CM

## 2019-01-28 DIAGNOSIS — I50.30 HYPERTENSIVE HEART DISEASE WITH DIASTOLIC HEART FAILURE: Primary | ICD-10-CM

## 2019-01-28 DIAGNOSIS — E11.8 TYPE 2 DIABETES MELLITUS WITH COMPLICATION, WITHOUT LONG-TERM CURRENT USE OF INSULIN: ICD-10-CM

## 2019-01-28 RX ORDER — HYDROCHLOROTHIAZIDE 12.5 MG/1
25 CAPSULE ORAL DAILY
Qty: 30 CAPSULE | Refills: 1 | Status: SHIPPED | OUTPATIENT
Start: 2019-01-28 | End: 2019-03-28 | Stop reason: DRUGHIGH

## 2019-01-28 NOTE — PROGRESS NOTES
Last 5 Patient Entered Readings                                      Current 30 Day Average: 160/97     Recent Readings 1/26/2019 1/25/2019 1/25/2019 1/21/2019 1/21/2019    SBP (mmHg) 168 154 152 176 179    DBP (mmHg) 112 94 89 105 112    Pulse 95 93 96 88 89        Called patient as received ALERT about elevated BP.  She denies signs or symptoms of elevated BP: headache, change in vision, numbness/tingling on one side, chest pain or shortness of breath. She states that she has NEVER had blood pressure lower than 150s/90s.  I reminded her of the goal of the program and that we will work together to gradually lower her blood pressure.     PMHx: HTN, TIA, dCHF, CKD3(1.0), DM2, OA, KAMRAN on CPAP  A1c 9.8% on 11/8/18, Endo NP Dorinda Pritchard 1/11/19   [facility]   Can Switch from HCTZ to chlorthalidone     Reviewed patient's medications and verified allergies on file.   Hypertension Medications             amLODIPine (NORVASC) 5 MG tablet Take 1 tablet (5 mg total) by mouth once daily. For Blood Pressure.    hydroCHLOROthiazide (MICROZIDE) 12.5 mg capsule Take 1 capsule (12.5 mg total) by mouth once daily. * Increase to 25 mg daily starting today.     valsartan (DIOVAN) 320 MG tablet Take 1 tablet (320 mg total) by mouth once daily.            Patient and I agreed that the patient will take her BP daily to every other day at varying times of the day.  Also asked that the BP be taken at least 1 hour after taking BP medications.     Explained that our goal is to get her BP to consistently below 130/80mmHg.     Emailed patient my direct phone number in case she has any questions.     From Profile:  Not complete yet

## 2019-01-29 ENCOUNTER — PATIENT MESSAGE (OUTPATIENT)
Dept: ADMINISTRATIVE | Facility: OTHER | Age: 74
End: 2019-01-29

## 2019-01-29 ENCOUNTER — CLINICAL SUPPORT (OUTPATIENT)
Dept: REHABILITATION | Facility: HOSPITAL | Age: 74
End: 2019-01-29
Payer: MEDICARE

## 2019-01-29 ENCOUNTER — CLINICAL SUPPORT (OUTPATIENT)
Dept: DIABETES | Facility: CLINIC | Age: 74
End: 2019-01-29
Payer: MEDICARE

## 2019-01-29 ENCOUNTER — PATIENT MESSAGE (OUTPATIENT)
Dept: ORTHOPEDICS | Facility: CLINIC | Age: 74
End: 2019-01-29

## 2019-01-29 DIAGNOSIS — G89.29 CHRONIC LEFT SHOULDER PAIN: ICD-10-CM

## 2019-01-29 DIAGNOSIS — M25.512 CHRONIC LEFT SHOULDER PAIN: ICD-10-CM

## 2019-01-29 DIAGNOSIS — E11.9 DIABETES MELLITUS WITHOUT COMPLICATION: ICD-10-CM

## 2019-01-29 PROCEDURE — G0108 DIAB MANAGE TRN  PER INDIV: HCPCS | Mod: S$GLB,,, | Performed by: INTERNAL MEDICINE

## 2019-01-29 PROCEDURE — G0108 PR DIAB MANAGE TRN  PER INDIV: ICD-10-PCS | Mod: S$GLB,,, | Performed by: INTERNAL MEDICINE

## 2019-01-29 PROCEDURE — 99999 PR PBB SHADOW E&M-EST. PATIENT-LVL III: ICD-10-PCS | Mod: PBBFAC,,,

## 2019-01-29 PROCEDURE — 99999 PR PBB SHADOW E&M-EST. PATIENT-LVL III: CPT | Mod: PBBFAC,,,

## 2019-01-29 PROCEDURE — 97140 MANUAL THERAPY 1/> REGIONS: CPT | Mod: PO

## 2019-01-29 PROCEDURE — 97110 THERAPEUTIC EXERCISES: CPT | Mod: PO

## 2019-01-29 NOTE — PROGRESS NOTES
Diabetes Education  Author: Monique Beltran RD, CDE  Date: 1/29/2019    Diabetes Care Management Summary  Diabetes Education Record Assessment/Progress: Initial     Diabetes Type  Diabetes Type : Type II    Diabetes History  Diabetes Diagnosis: 5-10 years  Current Treatment: Oral Medication, Injectable    Health Maintenance was reviewed today with patient. Discussed with patient importance of routine eye exams, foot exams/foot care, blood work (i.e.: A1c, microalbumin, and lipid), dental visits, yearly flu vaccine, and pneumonia vaccine as indicated by PCP. Patient verbalized understanding.     Health Maintenance Topics with due status: Not Due       Topic Last Completion Date    TETANUS VACCINE 07/18/2015    DEXA SCAN 03/29/2017    Lipid Panel 04/03/2018    Foot Exam 11/08/2018    Hemoglobin A1c 11/08/2018    Eye Exam 12/12/2018    Mammogram 12/13/2018    High Dose Statin 01/11/2019     Health Maintenance Due   Topic Date Due    Colonoscopy  05/14/2018     Nutrition  Meal Planning: skipping meals, snacks between meal, water, eats out often(Eats 1 meal a day)  What type of beverages do you drink?: water, milk, regular soda/tea(Hot chocolate sometimes)    Monitoring   Self Monitoring : (Checks about 5 times a week)  Blood Glucose Logs: No  Do you use a personal continuous glucose monitor?: No  In the last month, how often have you had a low blood sugar reaction?: never  What are your symptoms of low blood sugar?: (N/A)  How do you treat low blood sugar?: (N/A)  Can you tell when your blood sugar is too high?: no  How do you treat high blood sugar?: (None)    Exercise   Exercise Type: none    Current Diabetes Treatment   Current Treatment: Oral Medication, Injectable    Social History  Preferred Learning Method: Face to Face  Primary Support: Self  Smoking Status: Never a Smoker  Alcohol Use: Never    Barriers to Change  Barriers to Change: None  Learning Challenges : None    Readiness to Learn   Readiness to Learn :  Acceptance    Cultural Influences  Cultural Influences: No    Diabetes Education Assessment/Progress  Diabetes Disease Process (diabetes disease process and treatment options): Instructed, Discussion, Individual Session, Written Materials Provided  Nutrition (Incorporating nutritional management into one's lifestyle): Instructed, Discussion, Individual Session, Written Materials Provided(Reviewed general nutrition guidelines and plate method; encouraged to eliminate sugary beverages)  Physical Activity (incorporating physical activity into one's lifestyle): Instructed, Discussion, Individual Session, Written Materials Provided(Reviewed goals and benefits)  Medications (states correct name, dose, onset, peak, duration, side effects & timing of meds): Instructed, Discussion, Individual Session, Written Materials Provided(Reviewed medication regimen)  Monitoring (monitoring blood glucose/other parameters & using results): Instructed, Discussion, Individual Session, Written Materials Provided(Reviewed SMBG schedule and BG goals)  Acute Complications (preventing, detecting, and treating acute complications): Instructed, Discussion, Individual Session, Written Materials Provided(Reviewed s/s and treatment of hypoglycemia)  Chronic Complications (preventing, detecting, and treating chronic complications): Instructed, Discussion, Individual Session, Written Materials Provided(Reviewed standards of care)  Clinical (diabetes, other pertinent medical history, and relevant comorbidities reviewed during visit): Instructed, Discussion, Individual Session  Cognitive (knowledge of self-management skills, functional health literacy): Instructed, Discussion, Individual Session  Psychosocial (emotional response to diabetes): Instructed, Discussion, Individual Session  Diabetes Distress and Support Systems: Not Covered/Deferred(Time constraints)  Behavioral (readiness for change, lifestyle practices, self-care behaviors): Instructed,  Discussion, Individual Session    Goals  Patient has selected/evaluated goals during today's session: Yes, selected  Monitoring: Set(Check BG once a day)    Diabetes Care Plan/Intervention  Education Plan/Intervention: Individual Follow-Up DSMT    Diabetes Meal Plan  Restrictions: Restricted Carbohydrate    Today's Self-Management Care Plan was developed with the patient's input and is based on barriers identified during today's assessment.    The long and short-term goals in the care plan were written with the patient/caregiver's input. The patient has agreed to work toward these goals to improve her overall diabetes control.      The patient received a copy of today's self-management plan and verbalized understanding of the care plan, goals, and all of today's instructions.      The patient was encouraged to communicate with her physician and care team regarding her condition(s) and treatment.  I provided the patient with my contact information today and encouraged her to contact me via phone or patient portal as needed.     Education Units of Time   Time Spent: 45 min

## 2019-01-29 NOTE — PROGRESS NOTES
"  Physical Therapy Daily Treatment Note     Name: Kaylee Urena Romero  Clinic Number: [id number]    Therapy Diagnosis:   Encounter Diagnosis   Name Primary?    Chronic left shoulder pain      Physician: Elvia Whitehead PA    Visit Date: 1/29/2019     Physician Orders: PT Eval and Treat L shoulder pain  Medical Diagnosis from Referral: R shoulder impingement  Evaluation Date: 1/21/2019  Authorization Period Expiration: 1/7/20  Plan of Care Expiration: 4/21/19  Visit # / Visits authorized: 2/12    Time In:3:00 pm  Time Out: 3:55 pm  Total Billable Time: 40 minutes (TE-2, MT-1)    Precautions: Standard, TIA 11/4/18, DM II    Subjective     Pt reports: she was surprised she was not sore after her last session with Leonard. Pt reports no improvements in her pain levels today.   She was compliant with home exercise program, stating she was not able to complete as many reps as she was suppose to but did complete her exercises daily.   Response to previous treatment: No adverse effects  Functional change: none    Pain: 6/10  Location: L shoulder    Objective     Blood pressure pre tx: 150/95    Kaylee received the following manual therapy techniques: Joint mobilizations, Manual traction, Myofacial release and Soft tissue Mobilization were applied to the: L shoulder for 10 minutes, including:  STM to pec minor, pec major, deltoid  Post GH glide Gr II-III  Inf GH glide Gr III-IV  PROM within pain free range: flexion, ER and IR     Kaylee received therapeutic exercises to develop strength, endurance and ROM for 35 minutes including:    Dowel OH flexion 2x30  Isometric brueggars 3 sec holds 3x10 (can use pillow case or dowel)  Scap retraction/ depression 3 sec holds 5" 2x10    Kaylee received cold pack for inflammation control/pain modulation to the L shoulder minutes to 10.    Home Exercises Provided and Patient Education Provided     Education provided:   - Continue HEP    Written Home Exercises Provided: Patient instructed to cont " prior HEP.  Exercises were reviewed and Kaylee was able to demonstrate them prior to the end of the session.  Kaylee demonstrated good  understanding of the education provided.     Assessment     Pt unable to progress and limited today 2/2 increased pain and poor tolerance to exercises . Pt experienced increased discomfort with all manual interventions. . Pt demonstrated with an empty end feel and poor tolerance to gentle PROM.  Pt reports decreased pain following CP. Will attempt to progress next per pt's tolerance.   Kaylee is progressing well towards her goals.   Pt prognosis is Good.    Pt will continue to benefit from skilled outpatient physical therapy to address the deficits listed in the problem list box on initial evaluation, provide pt/family education and to maximize pt's level of independence in the home and community environment.   Pt's spiritual, cultural and educational needs considered and pt agreeable to plan of care and goals.    Anticipated barriers to physical therapy: HEP compliance, uncontrolled HTN  Medical Necessity is demonstrated by the following    Goals:   Short Term Goals: 3 weeks   1. Pt will be independent with HEP, demonstrate good form and report participating in walking program for at least 15 minutes/day 5 days/week  2. Pt will demonstrate pain free PROM of the L shoulder of at least 140 deg flexion, 110 deg abduction and 50 degrees of ER/IR @90 deg abduction to demonstrate improved functional mobility  3. Pt will report good compliance with prescribed medications, will report drinking at least 60 ounces of water/day and will report one healthy dietary change in order to improve tolerance to physical activity and reduce the risk of chest pain on exertion     Long Term Goals: 10 weeks   1. Pt will demonstrate full/symmetrical AROM to bilat shoulders reporting no pain at end range to demonstrate improved functional mobility  2. Pt will be able to independently don/doff shirt/ bra strap  without reports of pain to maximize pt's independence   3. Pt will be able to lift 5# overhead 5x and be able to carry 15# 25ft with L UE reporting less than 2/10 pain in order to demonstrate improved shoulder strength and function.    Plan     Continue POC. Progress as tolerated.   Next:  pendulums, sliding board flexion on plinth, seated lumbar flexion c/ ball.     Angi Smyth, PTA

## 2019-01-30 ENCOUNTER — TELEPHONE (OUTPATIENT)
Dept: REHABILITATION | Facility: HOSPITAL | Age: 74
End: 2019-01-30

## 2019-01-31 ENCOUNTER — CLINICAL SUPPORT (OUTPATIENT)
Dept: REHABILITATION | Facility: HOSPITAL | Age: 74
End: 2019-01-31
Payer: MEDICARE

## 2019-01-31 PROCEDURE — 97110 THERAPEUTIC EXERCISES: CPT | Mod: PO

## 2019-01-31 PROCEDURE — 97140 MANUAL THERAPY 1/> REGIONS: CPT | Mod: PO

## 2019-01-31 PROCEDURE — G8979 MOBILITY GOAL STATUS: HCPCS | Mod: CJ,PO

## 2019-01-31 PROCEDURE — G8978 MOBILITY CURRENT STATUS: HCPCS | Mod: CK,PO

## 2019-01-31 NOTE — PROGRESS NOTES
"Re-assessment    Physical Therapy Daily Treatment Note     Name: Kaylee Romero  Clinic Number: [id number]    Therapy Diagnosis:   No diagnosis found.  Physician: Elvia Whitehead PA    Visit Date: 1/31/2019     Physician Orders: PT Eval and Treat L shoulder pain  Medical Diagnosis from Referral: R shoulder impingement  Evaluation Date: 1/21/2019  Authorization Period Expiration: 1/7/20  Plan of Care Expiration: 4/21/19  Visit # / Visits authorized: 2/12    Time In: 10:15 pm (Pt w/ late arrival)  Time Out: 10:55 pm  Total Billable Time: 40    Precautions: Standard, TIA 11/4/18, DM II    Subjective     Pt reports: she has been experiencing increased shoulder pain of insidious onset since last treatment. Pt reports she is unsure what started her shoulder pain however she has had to take a tyelenol which she reports she was told by her PCP not to take    Pain: 7/10  Location: L shoulder    Objective     Pt received moist hot pack for tissue extensibility/ pain modulation    BP in R  pre tx: 178/100    PROM Left Comment Normal   Shoulder Flexion: 180 degrees  180 deg   Shoulder Abduction: 115 degrees  P! 180 deg   Shoulder ER, 90°ABD: 80 degrees  P!  90 deg   Shoulder IR, 90° ABD: 60 degrees  P! 90 deg   *denotes pain    Kaylee received the following manual therapy techniques: Joint mobilizations, Manual traction, Myofacial release and Soft tissue Mobilization were applied to the: L shoulder for 20 minutes, including:  STM to pec minor, pec major, deltoid  Post GH glide Gr II-III  Inf GH glide Gr III-IV  PROM within pain free range: flexion, ER and IR     Kaylee received therapeutic exercises to develop strength, endurance and ROM for 15 minutes including:    Supine    Dowel OH flexion 2x20  Dowel protraction 2x20    Standing    Isometric 3 sec holds x 10 (in neutral)    - flexion, ER, IR, extension  Scap retraction/ depression 3 sec holds 5" 2x10    Kaylee received cold pack for inflammation control/pain modulation to " the L shoulder minutes to 10.    Home Exercises Provided and Patient Education Provided     Education provided:   - Continue HEP    Written Home Exercises Provided: Patient instructed to cont prior HEP.  Exercises were reviewed and Kaylee was able to demonstrate them prior to the end of the session.  Kaylee demonstrated good  understanding of the education provided.     Assessment     Pt presents with good carry over of PROM since last treatment. Pt demonstrates increased tissue density to pec minor, benefits from STM. Pt was instructed to consult her PCP before continuing NSAID regiment, pt educated that ice or heat could be more conservative alternatives to pain medication. Pt was educated that d/t her HTN, it is not safe for us to perform any strenuous physical acitvity. Pt cued to breath during isometric exercises.     Kaylee is progressing well towards her goals.   Pt prognosis is Good.    Pt will continue to benefit from skilled outpatient physical therapy to address the deficits listed in the problem list box on initial evaluation, provide pt/family education and to maximize pt's level of independence in the home and community environment.   Pt's spiritual, cultural and educational needs considered and pt agreeable to plan of care and goals.    Anticipated barriers to physical therapy: HEP compliance, uncontrolled HTN  Medical Necessity is demonstrated by the following    Goals:   Short Term Goals: 3 weeks   1. Pt will be independent with HEP, demonstrate good form and report participating in walking program for at least 15 minutes/day 5 days/week  2. Pt will demonstrate pain free PROM of the L shoulder of at least 140 deg flexion, 110 deg abduction and 50 degrees of ER/IR @90 deg abduction to demonstrate improved functional mobility  3. Pt will report good compliance with prescribed medications, will report drinking at least 60 ounces of water/day and will report one healthy dietary change in order to improve  tolerance to physical activity and reduce the risk of chest pain on exertion     Long Term Goals: 10 weeks   1. Pt will demonstrate full/symmetrical AROM to bilat shoulders reporting no pain at end range to demonstrate improved functional mobility  2. Pt will be able to independently don/doff shirt/ bra strap without reports of pain to maximize pt's independence   3. Pt will be able to lift 5# overhead 5x and be able to carry 15# 25ft with L UE reporting less than 2/10 pain in order to demonstrate improved shoulder strength and function.    Plan     Continue POC. Progress as tolerated.   Next:  pendulums, sliding board flexion on plinth, seated lumbar flexion c/ ball. Serratus wall slides    Leonard Germain, PT

## 2019-02-01 ENCOUNTER — PATIENT MESSAGE (OUTPATIENT)
Dept: INTERNAL MEDICINE | Facility: CLINIC | Age: 74
End: 2019-02-01

## 2019-02-01 ENCOUNTER — PATIENT MESSAGE (OUTPATIENT)
Dept: ADMINISTRATIVE | Facility: OTHER | Age: 74
End: 2019-02-01

## 2019-02-01 ENCOUNTER — PATIENT MESSAGE (OUTPATIENT)
Dept: ORTHOPEDICS | Facility: CLINIC | Age: 74
End: 2019-02-01

## 2019-02-05 ENCOUNTER — CLINICAL SUPPORT (OUTPATIENT)
Dept: REHABILITATION | Facility: HOSPITAL | Age: 74
End: 2019-02-05
Payer: MEDICARE

## 2019-02-05 DIAGNOSIS — G89.29 CHRONIC LEFT SHOULDER PAIN: ICD-10-CM

## 2019-02-05 DIAGNOSIS — M25.512 CHRONIC LEFT SHOULDER PAIN: ICD-10-CM

## 2019-02-05 PROCEDURE — 97140 MANUAL THERAPY 1/> REGIONS: CPT | Mod: PO

## 2019-02-05 PROCEDURE — 97110 THERAPEUTIC EXERCISES: CPT | Mod: PO

## 2019-02-05 NOTE — PROGRESS NOTES
"  Physical Therapy Daily Treatment Note     Name: Kaylee Urena Romero  Clinic Number: [id number]    Therapy Diagnosis:   Encounter Diagnosis   Name Primary?    Chronic left shoulder pain      Physician: Elvia Whitehead PA    Visit Date: 2/5/2019     Physician Orders: PT Eval and Treat L shoulder pain  Medical Diagnosis from Referral: R shoulder impingement  Evaluation Date: 1/21/2019  Authorization Period Expiration: 1/7/20  Plan of Care Expiration: 4/21/19  Visit # / Visits authorized: 3/12    Time In: 2:00 pm  Time Out: 3:00 pm  Total Billable Time: 30 minutes (MT-1, TE-1)    Precautions: Standard, TIA 11/4/18, DM II    Subjective     Pt reports: she is doing "okay" with minimal pain currenly. Pt reports that she has been taking tylenol, stating it is the only medication her doctor approved her to take . Pt reports having a lot of trouble sleeping due to increased pain in her shoulder at night.     Pain: 7/10  Location: L shoulder    Objective     Pt received moist hot pack for tissue extensibility/ pain modulation    BP in R  pre tx: 165/95    Kaylee received the following manual therapy techniques: Joint mobilizations, Manual traction, Myofacial release and Soft tissue Mobilization were applied to the: L shoulder for 20 minutes, including:  STM to pec minor, pec major, deltoid  Post GH glide Gr II-III  Inf GH glide Gr III-IV  PROM within pain free range: flexion, ER and IR     Kaylee received therapeutic exercises to develop strength, endurance and ROM for 20 minutes including:    Supine    Dowel OH flexion 2x20  Dowel protraction 2x20    Standing  Isometric 3 sec holds x 10 (in neutral)    - flexion, ER, IR, extension  Scap retraction/ depression 3 sec holds 5" 2x10    Kaylee received cold pack for inflammation control/pain modulation to the L shoulder minutes to 10.    Home Exercises Provided and Patient Education Provided     Education provided:   - Continue HEP    Written Home Exercises Provided: Patient " instructed to cont prior HEP.  Exercises were reviewed and Kaylee was able to demonstrate them prior to the end of the session.  Kaylee demonstrated good  understanding of the education provided.     Assessment     Pt unable to progress much today due to poor tolerance to active movement, as well as presenting c/ an elevated blood pressure. . Pt continues to denote pain with wall isometric exercises, although able to complete. Pt reports decreased pain and tension following manual interventions, although stating performing her exercises after manual is challenging and requests doing her exercises first during her next session. Will continue to monitor pain, HTN and progress as able.   Kaylee is progressing well towards her goals.   Pt prognosis is Good.    Pt will continue to benefit from skilled outpatient physical therapy to address the deficits listed in the problem list box on initial evaluation, provide pt/family education and to maximize pt's level of independence in the home and community environment.   Pt's spiritual, cultural and educational needs considered and pt agreeable to plan of care and goals.    Anticipated barriers to physical therapy: HEP compliance, uncontrolled HTN    Goals:   Short Term Goals: 3 weeks   1. Pt will be independent with HEP, demonstrate good form and report participating in walking program for at least 15 minutes/day 5 days/week  2. Pt will demonstrate pain free PROM of the L shoulder of at least 140 deg flexion, 110 deg abduction and 50 degrees of ER/IR @90 deg abduction to demonstrate improved functional mobility  3. Pt will report good compliance with prescribed medications, will report drinking at least 60 ounces of water/day and will report one healthy dietary change in order to improve tolerance to physical activity and reduce the risk of chest pain on exertion     Long Term Goals: 10 weeks   1. Pt will demonstrate full/symmetrical AROM to bilat shoulders reporting no pain at  end range to demonstrate improved functional mobility  2. Pt will be able to independently don/doff shirt/ bra strap without reports of pain to maximize pt's independence   3. Pt will be able to lift 5# overhead 5x and be able to carry 15# 25ft with L UE reporting less than 2/10 pain in order to demonstrate improved shoulder strength and function.    Plan     Continue POC. Progress as tolerated.     Angi Smyth, PTA

## 2019-02-06 ENCOUNTER — PATIENT OUTREACH (OUTPATIENT)
Dept: OTHER | Facility: OTHER | Age: 74
End: 2019-02-06

## 2019-02-06 NOTE — PROGRESS NOTES
Last 5 Patient Entered Readings                                      Current 30 Day Average: 160/97     Recent Readings 2/6/2019 2/6/2019 2/4/2019 1/30/2019 1/28/2019    SBP (mmHg) 180 165 134 159 169    DBP (mmHg) 110 107 93 96 77    Pulse 95 91 89 104 92          Called patient as received ALERT about elevated BP.  She denies signs or symptoms of elevated BP: headache, change in vision, numbness/tingling on one side, chest pain or shortness of breath. She is frustrated because first reading is BEFORE she took medicine then second reading is 3 hours AFTER medication.  She is taking increased dose of HCTZ as instructed.    Pt going out of town next week and requests hiatus.  Will f/u 2 weeks from today.      Hypertension Medications             amLODIPine (NORVASC) 5 MG tablet Take 1 tablet (5 mg total) by mouth once daily. For Blood Pressure.    hydroCHLOROthiazide (MICROZIDE) 12.5 mg capsule Take 2 capsules (25 mg total) by mouth once daily.    valsartan (DIOVAN) 320 MG tablet Take 1 tablet (320 mg total) by mouth once daily.

## 2019-02-07 ENCOUNTER — CLINICAL SUPPORT (OUTPATIENT)
Dept: REHABILITATION | Facility: HOSPITAL | Age: 74
End: 2019-02-07
Payer: MEDICARE

## 2019-02-07 PROCEDURE — 97140 MANUAL THERAPY 1/> REGIONS: CPT | Mod: PO

## 2019-02-07 PROCEDURE — 97110 THERAPEUTIC EXERCISES: CPT | Mod: PO

## 2019-02-07 NOTE — PROGRESS NOTES
"  Physical Therapy Daily Treatment Note     Name: Kaylee Romero  Clinic Number: [id number]    Therapy Diagnosis:   No diagnosis found.  Physician: Elvia Whitehead PA    Visit Date: 2/7/2019     Physician Orders: PT Eval and Treat L shoulder pain  Medical Diagnosis from Referral: R shoulder impingement  Evaluation Date: 1/21/2019  Authorization Period Expiration: 1/7/20  Plan of Care Expiration: 4/21/19  Visit # / Visits authorized: 4/12    Time In: 1:00 pm  Time Out: 1:50 pm  Total Billable Time: 50    Precautions: Standard, TIA 11/4/18, DM II, uncontrolled HTN    Subjective     Pt reports: she has been monitoring her BP, doctor instructed her to double her current HTN medication. Pt reports the shoulder has been more manageable, pt has been able to control pain with topical analgesics vs NSAIDs which pt was instructed to stay away from d/t kidney co-morbidity. Pt requests doing exercises prior to manual because she feels the exercises feel worse after doing manual.     Pain: 6/10  Location: L shoulder    Objective     Pt received moist hot pack to L shoulder for tissue extensibility/ pain modulation for 10 min    BP in R  R UE in sitting following moist hot back: 165/95    Kaylee received therapeutic exercises to develop strength, endurance and ROM for 20 minutes including:    Supine    Dowel OH flexion w/ 4# 2x20  Dowel protraction w/ 4# 2x20  Dowel ER (towel roll under elbow) 2x20    Standing - NP    Isometric 3 sec holds x 10 (in neutral)    - flexion, ER, IR, extension  Scap retraction/ depression 3 sec holds 5" 2x10    Kaylee received the following manual therapy techniques: Joint mobilizations, Manual traction, Myofacial release and Soft tissue Mobilization were applied to the: L shoulder for 30 minutes, including:  STM to pec minor, pec major, deltoid  Post GH glide Gr II-III  Inf GH glide Gr III-IV  PROM within pain free range: flexion, ER and IR     BP taken in L UE in supine: 170/100    Kaylee received " cold pack for inflammation control/pain modulation to the L shoulder 10 minutes    BP taken in L UE in sitting 178/102    Home Exercises Provided and Patient Education Provided     Education provided:   - Continue HEP, importance of monitoring BP with digital medicine program, importance of drinking water, importance of eating more than 1 meal a day and snacking    Written Home Exercises Provided: Patient instructed to cont prior HEP.  Exercises were reviewed and Kaylee was able to demonstrate them prior to the end of the session.  Kaylee demonstrated good  understanding of the education provided.     Assessment     Pt presents with continued uncontrolled HTN. Pt was educated that although she is currently asymptomatic, progressed exercises will be held until her BP is closer to the goal range. Pt tolerates ROM well, increased pain reported with STM to pec minor however pt reports reduction in pain following ice. Pt reports not eating or drinking anything prior to treatment (reports she has been awake since 7am). Pt was given one cup of water and instructed not to attend therapy sessions prior to eating/ drinking anything for the day. Pt reports good understanding. Pt instructed to bring in 3 day journal of food/water consumption.    Kaylee is progressing well towards her goals.   Pt prognosis is Good.    Pt will continue to benefit from skilled outpatient physical therapy to address the deficits listed in the problem list box on initial evaluation, provide pt/family education and to maximize pt's level of independence in the home and community environment.   Pt's spiritual, cultural and educational needs considered and pt agreeable to plan of care and goals.    Anticipated barriers to physical therapy: HEP compliance, uncontrolled HTN    Goals:   Short Term Goals: 3 weeks   1. Pt will be independent with HEP, demonstrate good form and report participating in walking program for at least 15 minutes/day 5  days/week  2. Pt will demonstrate pain free PROM of the L shoulder of at least 140 deg flexion, 110 deg abduction and 50 degrees of ER/IR @90 deg abduction to demonstrate improved functional mobility  3. Pt will report good compliance with prescribed medications, will report drinking at least 60 ounces of water/day and will report one healthy dietary change in order to improve tolerance to physical activity and reduce the risk of chest pain on exertion     Long Term Goals: 10 weeks   1. Pt will demonstrate full/symmetrical AROM to bilat shoulders reporting no pain at end range to demonstrate improved functional mobility  2. Pt will be able to independently don/doff shirt/ bra strap without reports of pain to maximize pt's independence   3. Pt will be able to lift 5# overhead 5x and be able to carry 15# 25ft with L UE reporting less than 2/10 pain in order to demonstrate improved shoulder strength and function.    Plan     Continue POC. Progress as tolerated.     Leonard Germain, PT

## 2019-02-08 ENCOUNTER — TELEPHONE (OUTPATIENT)
Dept: NEUROLOGY | Facility: CLINIC | Age: 74
End: 2019-02-08

## 2019-02-08 NOTE — TELEPHONE ENCOUNTER
----- Message from Eliza Sepulvedashelley sent at 2/8/2019  2:28 PM CST -----  Contact: pt at [phone]  Needs Advice    Reason for call:Pt will  be leaving town this Sunday and will be gone for a week.  Ref to scheduling to setting up an appt  When she returns.  She thinks that he is leaving [facility] soon.  Pt can be reached at  [phone] or husbands cell at [phone].  Communication Preference:    Additional Information:

## 2019-02-18 ENCOUNTER — PATIENT MESSAGE (OUTPATIENT)
Dept: NEUROLOGY | Facility: CLINIC | Age: 74
End: 2019-02-18

## 2019-02-18 ENCOUNTER — TELEPHONE (OUTPATIENT)
Dept: NEUROLOGY | Facility: CLINIC | Age: 74
End: 2019-02-18

## 2019-02-18 ENCOUNTER — TELEPHONE (OUTPATIENT)
Dept: OPHTHALMOLOGY | Facility: CLINIC | Age: 74
End: 2019-02-18

## 2019-02-18 NOTE — TELEPHONE ENCOUNTER
----- Message from Beverly Mckeon sent at 2/18/2019  4:33 PM CST -----  Contact: Kaylee Romero   Pt would like to speak with  nurse pt has a syte on the left eye,pt can be reached at [phone] please thank you.

## 2019-02-19 ENCOUNTER — CLINICAL SUPPORT (OUTPATIENT)
Dept: REHABILITATION | Facility: HOSPITAL | Age: 74
End: 2019-02-19
Payer: MEDICARE

## 2019-02-19 ENCOUNTER — PATIENT OUTREACH (OUTPATIENT)
Dept: OTHER | Facility: OTHER | Age: 74
End: 2019-02-19

## 2019-02-19 DIAGNOSIS — M25.512 CHRONIC LEFT SHOULDER PAIN: ICD-10-CM

## 2019-02-19 DIAGNOSIS — G89.29 CHRONIC LEFT SHOULDER PAIN: ICD-10-CM

## 2019-02-19 PROCEDURE — 97140 MANUAL THERAPY 1/> REGIONS: CPT | Mod: PO

## 2019-02-19 PROCEDURE — 97110 THERAPEUTIC EXERCISES: CPT | Mod: PO

## 2019-02-19 NOTE — PROGRESS NOTES
"Last 5 Patient Entered Readings                                      Current 30 Day Average: 165/101     Recent Readings 2/19/2019 2/9/2019 2/6/2019 2/6/2019 2/4/2019    SBP (mmHg) 171 175 180 165 134    DBP (mmHg) 111 100 110 107 93    Pulse 93 92 95 91 89        Called patient as received ALERT about elevated BP.  She denies signs or symptoms of elevated BP: headache, change in vision, numbness/tingling on one side, chest pain or shortness of breath.     Increased amlodipine from 5 mg to 10 mg daily.  Pt asked "how long until I destroy my kidneys"?  I told her that is impossible to determine but we are actively working on getting her BP under control.  She verbalized understanding.      Hypertension Medications             amLODIPine (NORVASC) 5 MG tablet Take 1 tablet (5 mg total) by mouth once daily. For Blood Pressure.    hydroCHLOROthiazide (MICROZIDE) 12.5 mg capsule Take 2 capsules (25 mg total) by mouth once daily.    valsartan (DIOVAN) 320 MG tablet Take 1 tablet (320 mg total) by mouth once daily.        F/U in 2 weeks.     "

## 2019-02-19 NOTE — PROGRESS NOTES
"  Physical Therapy Daily Treatment Note     Name: Kaylee Urena [place]  Clinic Number: [id number]    Therapy Diagnosis:   Encounter Diagnosis   Name Primary?    Chronic left shoulder pain      Physician: Elvia Whitehead PA    Visit Date: 2019     Physician Orders: PT Eval and Treat L shoulder pain  Medical Diagnosis from Referral: R shoulder impingement  Evaluation Date: 2019  Authorization Period Expiration: 20  Plan of Care Expiration: 19  Visit # / Visits authorized:     Time In: 1:00 pm  Time Out: 2:00 pm  Total Billable Time: 30 minutes (MT-1, TE-1)    Precautions: Standard, TIA 18, DM II, uncontrolled HTN    Subjective     Pt reports: she feels like she has regressed because she has not done any of her exercises since her last session.     Pain: 6/10  Location: L shoulder    Objective     Pt received moist hot pack to L shoulder for tissue extensibility/ pain modulation for 0 min- NP    BP in R UE in sittin/98    Kaylee received therapeutic exercises to develop strength, endurance and ROM for 35 minutes including:    Supine    Dowel OH flexion w/ 3# 2x20  Dowel protraction w/ 3# 2x20  Dowel ER (towel roll under elbow) 2x20    Standing  Isometric 3 sec holds x 15 (in neutral)    - flexion, ER, IR, extension  Scap retraction/ depression 3 sec holds 5" 2x10    Seated:  Flexion forearm slides: 3x10    Kaylee received the following manual therapy techniques: Joint mobilizations, Manual traction, Myofacial release and Soft tissue Mobilization were applied to the: L shoulder for 15 minutes, including:  STM to pec minor, pec major, deltoid  Post GH glide Gr II-III  Inf GH glide Gr III-IV  PROM within pain free range: flexion, ER and IR     Kaylee received cold pack for inflammation control/pain modulation to the L shoulder 10 minutes    Home Exercises Provided and Patient Education Provided     Education provided:   - Importance of completing HEP    Written Home Exercises Provided: Patient " instructed to cont prior HEP.  Exercises were reviewed and Kaylee was able to demonstrate them prior to the end of the session.  Kaylee demonstrated good  understanding of the education provided.     Assessment     TE performed today before manual interventions per pt request. Pt demonstrated poor/guarded tolerance to manual interventions today although denies any pain or discomfort while performing. Pt initially having pain and tightness with all movement, which slowly improved throughout session and she was able to complete with no increased pain. Pt continues to remain limited 2/2 uncontrolled HTN, pain and guarding.   Kaylee is progressing well towards her goals.   Pt prognosis is Good.    Pt will continue to benefit from skilled outpatient physical therapy to address the deficits listed in the problem list box on initial evaluation, provide pt/family education and to maximize pt's level of independence in the home and community environment.   Pt's spiritual, cultural and educational needs considered and pt agreeable to plan of care and goals.    Anticipated barriers to physical therapy: HEP compliance, uncontrolled HTN    Goals:   Short Term Goals: 3 weeks   1. Pt will be independent with HEP, demonstrate good form and report participating in walking program for at least 15 minutes/day 5 days/week  2. Pt will demonstrate pain free PROM of the L shoulder of at least 140 deg flexion, 110 deg abduction and 50 degrees of ER/IR @90 deg abduction to demonstrate improved functional mobility  3. Pt will report good compliance with prescribed medications, will report drinking at least 60 ounces of water/day and will report one healthy dietary change in order to improve tolerance to physical activity and reduce the risk of chest pain on exertion     Long Term Goals: 10 weeks   1. Pt will demonstrate full/symmetrical AROM to bilat shoulders reporting no pain at end range to demonstrate improved functional mobility  2. Pt  will be able to independently don/doff shirt/ bra strap without reports of pain to maximize pt's independence   3. Pt will be able to lift 5# overhead 5x and be able to carry 15# 25ft with L UE reporting less than 2/10 pain in order to demonstrate improved shoulder strength and function.    Plan     Continue POC. Progress as tolerated.     Angi Smyth, PTA

## 2019-02-19 NOTE — TELEPHONE ENCOUNTER
----- Message from Juan Harrell sent at 2/18/2019 11:58 AM CST -----  Needs Advice    Reason for call: Pt is calling to schedule a f/u appt w/ the doctor. Pt last seen on 12/11/18. Pt is asking for a call back today.        Communication Preference: [phone]    Additional Information:

## 2019-02-20 ENCOUNTER — TELEPHONE (OUTPATIENT)
Dept: OPHTHALMOLOGY | Facility: CLINIC | Age: 74
End: 2019-02-20

## 2019-02-20 NOTE — TELEPHONE ENCOUNTER
Patient informed that we don't have any sooner appts available, but we can add her to our waiting list for a sooner appointment. Patient informed that if a sooner appt opens, we can call her to offer her the appt. Patient declined waiting list.    Patient states that she will pay our of pocket if she can get a sooner appt. Patient informed again that we don't have any sooner appts available, but we have submitted an authorization so she wouldn't have to pay out of pocket. Patient further informed that her authorization is still pending. Patient asked if there was anything that we can do on our end to speed up the process. I informed the patient that we have submitted her authorization and it is up to her insurance company to decide if they will authorize or deny her procedure. Patient asked if I can elisha her case as urgent. I informed the patient that I was unable to do this because her procedure isn't medically urgent due to per last visit with Dr. Pelayo (patient has chalazions and it's not affecting her health or well being).    Patient states that she will wait for procedure, but if anything happens before then, she will take it up with [facility]. Name give to patient.

## 2019-02-20 NOTE — TELEPHONE ENCOUNTER
----- Message from Beverly Mckeon sent at 2/20/2019 11:54 AM CST -----  Contact: Kaylee Romero   Pt would like to speak  nurse please about the left eye annabelpt can be reached at [phone] please thank you.

## 2019-02-21 ENCOUNTER — PATIENT OUTREACH (OUTPATIENT)
Dept: OTHER | Facility: OTHER | Age: 74
End: 2019-02-21

## 2019-02-21 ENCOUNTER — OFFICE VISIT (OUTPATIENT)
Dept: PODIATRY | Facility: CLINIC | Age: 74
End: 2019-02-21
Payer: MEDICARE

## 2019-02-21 ENCOUNTER — CLINICAL SUPPORT (OUTPATIENT)
Dept: REHABILITATION | Facility: HOSPITAL | Age: 74
End: 2019-02-21
Payer: MEDICARE

## 2019-02-21 ENCOUNTER — PROCEDURE VISIT (OUTPATIENT)
Dept: OPHTHALMOLOGY | Facility: CLINIC | Age: 74
End: 2019-02-21
Payer: MEDICARE

## 2019-02-21 VITALS — HEIGHT: 62 IN | WEIGHT: 219 LBS | BODY MASS INDEX: 40.3 KG/M2

## 2019-02-21 VITALS — SYSTOLIC BLOOD PRESSURE: 151 MMHG | DIASTOLIC BLOOD PRESSURE: 87 MMHG | HEART RATE: 102 BPM

## 2019-02-21 DIAGNOSIS — I11.0 HYPERTENSIVE HEART DISEASE WITH DIASTOLIC HEART FAILURE: Primary | ICD-10-CM

## 2019-02-21 DIAGNOSIS — N18.30 CKD (CHRONIC KIDNEY DISEASE) STAGE 3, GFR 30-59 ML/MIN: ICD-10-CM

## 2019-02-21 DIAGNOSIS — H00.14 CHALAZION LEFT UPPER EYELID: Primary | ICD-10-CM

## 2019-02-21 DIAGNOSIS — I50.30 HYPERTENSIVE HEART DISEASE WITH DIASTOLIC HEART FAILURE: ICD-10-CM

## 2019-02-21 DIAGNOSIS — I50.30 HYPERTENSIVE HEART DISEASE WITH DIASTOLIC HEART FAILURE: Primary | ICD-10-CM

## 2019-02-21 DIAGNOSIS — Z86.73 HISTORY OF TIA (TRANSIENT ISCHEMIC ATTACK): ICD-10-CM

## 2019-02-21 DIAGNOSIS — M75.42 SHOULDER IMPINGEMENT SYNDROME, LEFT: ICD-10-CM

## 2019-02-21 DIAGNOSIS — E11.49 TYPE II DIABETES MELLITUS WITH NEUROLOGICAL MANIFESTATIONS: Primary | ICD-10-CM

## 2019-02-21 DIAGNOSIS — L85.3 DRY SKIN: ICD-10-CM

## 2019-02-21 DIAGNOSIS — I11.0 HYPERTENSIVE HEART DISEASE WITH DIASTOLIC HEART FAILURE: ICD-10-CM

## 2019-02-21 DIAGNOSIS — B35.1 DERMATOPHYTOSIS OF NAIL: ICD-10-CM

## 2019-02-21 PROCEDURE — 92014 COMPRE OPH EXAM EST PT 1/>: CPT | Mod: S$GLB,,, | Performed by: OPHTHALMOLOGY

## 2019-02-21 PROCEDURE — 99213 PR OFFICE/OUTPT VISIT, EST, LEVL III, 20-29 MIN: ICD-10-PCS | Mod: 25,S$GLB,, | Performed by: PODIATRIST

## 2019-02-21 PROCEDURE — 99212 OFFICE O/P EST SF 10 MIN: CPT | Mod: PBBFAC,PN | Performed by: PODIATRIST

## 2019-02-21 PROCEDURE — 99999 PR PBB SHADOW E&M-EST. PATIENT-LVL II: ICD-10-PCS | Mod: PBBFAC,,, | Performed by: PODIATRIST

## 2019-02-21 PROCEDURE — 92014 PR EYE EXAM, EST PATIENT,COMPREHESV: ICD-10-PCS | Mod: S$GLB,,, | Performed by: OPHTHALMOLOGY

## 2019-02-21 PROCEDURE — 97140 MANUAL THERAPY 1/> REGIONS: CPT | Mod: PO

## 2019-02-21 PROCEDURE — 11721 DEBRIDE NAIL 6 OR MORE: CPT | Mod: Q9,PBBFAC,PN | Performed by: PODIATRIST

## 2019-02-21 PROCEDURE — 11721 ROUTINE FOOT CARE: ICD-10-PCS | Mod: Q9,S$GLB,, | Performed by: PODIATRIST

## 2019-02-21 PROCEDURE — 97110 THERAPEUTIC EXERCISES: CPT | Mod: PO

## 2019-02-21 PROCEDURE — 99999 PR PBB SHADOW E&M-EST. PATIENT-LVL II: CPT | Mod: PBBFAC,,, | Performed by: PODIATRIST

## 2019-02-21 PROCEDURE — 99213 OFFICE O/P EST LOW 20 MIN: CPT | Mod: 25,S$GLB,, | Performed by: PODIATRIST

## 2019-02-21 NOTE — PATIENT INSTRUCTIONS
How to Check Your Feet    Below are tips to help you look for foot problems. Try to check your feet at the same time each day, such as when you get out of bed in the morning.    · Check the top of each foot. The tops of toes, back of the heel, and outer edge of the foot can get a lot of rubbing from poor-fitting shoes.    · Check the bottom of each foot. Daily wear and tear often leads to problems at pressure spots.    · Check the toes and nails. Fungal infections often occur between toes. Toenail problems can also be a sign of fungal infections or lead to breaks in the skin.    · Check your shoes, too. Loose objects inside a shoe can injure the foot. Use your hand to feel inside your shoes for things like gissell, loose stitching, or rough areas that could irritate your skin.        Diabetic Foot Care    Diabetes can lead to a number of different foot complications. Fortunately, most of these complications can be prevented with a little extra foot care. If diabetes is not well controlled, the high blood sugar can cause damage to blood vessels and result in poor circulation to the foot. When the skin does not get enough blood flow, it becomes prone to pressure sores and ulcers, which heal slowly.  High blood sugar can also damage nerves, interfering with the ability to feel pain and pressure. When you cant feel your foot normally, it is easy to injure your skin, bones and joints without knowing it. For these reasons diabetes increases the risk of fungal infections, bunions and ulcers. Deep ulcers can lead to bone infection. Gangrene is the most serious foot complication of diabetes. It usually occurs on the tips of the toes as blacked areas of skin. The black area is dead tissue. In severe cases, gangrene spreads to involve the entire toe, other toes and the entire foot. Foot or toe amputation may be required. Good foot care and blood sugar control can prevent this.    Home Care  1. Wear comfortable, proper fitting  shoes.  2. Wash your feet daily with warm water and mild soap.  3. After drying, apply a moisturizing cream or lotion.  4. Check your feet daily for skin breaks, blisters, swelling, or redness. Look between your toes also.  5. Wear cotton socks and change them every day.  6. Trim toe nails carefully and do not cut your cuticles.  7. Strive to keep your blood sugar under control with a combination of medicines, diet and activity.  8. If you smoke and have diabetes, it is very important that you stop. Smoking reduces blood flow to your foot.  9. Avoid activities that increase your risk of foot injury:  · Do not walk barefoot.  · Do not use heating pads or hot water bottles on your feet.  · Do not put your foot in a hot tub without first checking the temperature with your hand.  10) Schedule yearly foot exams.    Follow Up  with your doctor or as advised by our staff. Report any cut, puncture, scrape, other injury, blister, ingrown toenail or ulcer on your foot.    Get Prompt Medical Attention  if any of the following occur:  -- Open ulcer with pus draining from the wound  -- Increasing foot or leg pain  -- New areas of redness or swelling or tender areas of the foot    © 8949-9293 [employer]. [address] [place] [address], [place] [address]. All rights reserved. This information is not intended as a substitute for professional medical care. Always follow your healthcare professional's instructions.

## 2019-02-21 NOTE — PROGRESS NOTES
Last 5 Patient Entered Readings                                      Current 30 Day Average: 162/98     Recent Readings 2/21/2019 2/20/2019 2/19/2019 2/9/2019 2/6/2019    SBP (mmHg) 170 146 171 175 180    DBP (mmHg) 122 89 111 100 110    Pulse 93 95 93 92 95          Called patient as received ALERT about elevated BP.  She denies signs or symptoms of elevated BP: headache, change in vision, numbness/tingling on one side, chest pain or shortness of breath.  Started on 10 mg of Amlodipine on 2/19 which she confirms she is taking.      Hypertension Medications             amLODIPine (NORVASC) 5 MG tablet Take 2 tablets (10 mg total) by mouth once daily. For Blood Pressure.    hydroCHLOROthiazide (MICROZIDE) 12.5 mg capsule Take 2 capsules (25 mg total) by mouth once daily.    valsartan (DIOVAN) 320 MG tablet Take 1 tablet (320 mg total) by mouth once daily.        Will f/u.

## 2019-02-21 NOTE — PROGRESS NOTES
"  Physical Therapy Daily Treatment Note     Name: Kaylee Urena Haddock  Clinic Number: [id number]    Therapy Diagnosis:   Encounter Diagnosis   Name Primary?    Shoulder impingement syndrome, left      Physician: Elvia Whitehead PA    Visit Date: 2019     Physician Orders: PT Eval and Treat L shoulder pain  Medical Diagnosis from Referral: R shoulder impingement  Evaluation Date: 2019  Authorization Period Expiration: 20  Plan of Care Expiration: 19  Visit # / Visits authorized:     Time In: 1:00 pm  Time Out: 1:45 pm  Total Billable Time: 45 minutes (MT-1, TE-1)    Precautions: Standard, TIA 18, DM II, uncontrolled HTN    Subjective     Pt reports: her shoulder is feeling better. Pillow under elbow while laying supine has helped decrease pain. Pt has been monitoring BP and continues to run high (>170 systolic) has been in contact with hospital concerning this but is otherwise asymptomatic.     Pain: 5/10  Location: L shoulder    Objective     Pt received moist hot pack to L shoulder for tissue extensibility/ pain modulation for 10 min    BP in R UE in sittin/98    Kaylee received therapeutic exercises to develop strength, endurance and ROM for 30 minutes including:    Supine    Dowel OH flexion w/ 3# 2x20  Dowel protraction w/ 3# 2x20  Dowel ER (towel roll under elbow) 2x20    Standing  Isometric 3 sec holds ( flexion, ER, IR, extension) x 15 - NP  YTB I's 2x15  YTB rows 2x15  Protraction plank at wall 2x10    -  Scap retraction/ depression 3 sec holds 5" 2x10    Seated:  Flexion forearm slides: 3x10    Kaylee received the following manual therapy techniques: Joint mobilizations, Manual traction, Myofacial release and Soft tissue Mobilization were applied to the: L shoulder for 15 minutes, including:  STM to pec minor, pec major, deltoid  Post GH glide Gr II-III  Inf GH glide Gr III-IV  PROM within pain free range: flexion, ER and IR     Kaylee received cold pack for inflammation " control/pain modulation to the L shoulder 10 minutes    Home Exercises Provided and Patient Education Provided     Education provided:   - Importance of completing HEP; continued monitoring of BP, and proper hydration     Written Home Exercises Provided: Patient instructed to cont prior HEP; w/ addition of YTB I's and rows  Exercises were reviewed and Kaylee was able to demonstrate them prior to the end of the session.  Kaylee demonstrated good  understanding of the education provided.     Assessment     Pt presents with near full PROM although she reports poor tolerance to dowel AAROM. Pt was educated on sx of MI/CVA and warned to immediately stop performance of HEP if she has any sx. Pt tolerates progressed therex well today, requires tactile cuing for scapular proprioception and positioning. Will continue to progress exercise as tolerated.     Kaylee is progressing well towards her goals.   Pt prognosis is Good.    Pt will continue to benefit from skilled outpatient physical therapy to address the deficits listed in the problem list box on initial evaluation, provide pt/family education and to maximize pt's level of independence in the home and community environment.   Pt's spiritual, cultural and educational needs considered and pt agreeable to plan of care and goals.    Goals:   Short Term Goals: 3 weeks   1. Pt will be independent with HEP, demonstrate good form and report participating in walking program for at least 15 minutes/day 5 days/week  2. Pt will demonstrate pain free PROM of the L shoulder of at least 140 deg flexion, 110 deg abduction and 50 degrees of ER/IR @90 deg abduction to demonstrate improved functional mobility  3. Pt will report good compliance with prescribed medications, will report drinking at least 60 ounces of water/day and will report one healthy dietary change in order to improve tolerance to physical activity and reduce the risk of chest pain on exertion     Long Term Goals: 10 weeks    1. Pt will demonstrate full/symmetrical AROM to bilat shoulders reporting no pain at end range to demonstrate improved functional mobility  2. Pt will be able to independently don/doff shirt/ bra strap without reports of pain to maximize pt's independence   3. Pt will be able to lift 5# overhead 5x and be able to carry 15# 25ft with L UE reporting less than 2/10 pain in order to demonstrate improved shoulder strength and function.    Plan     Continue POC. Progress as tolerated.     Leonard Germain, PT

## 2019-02-21 NOTE — PROGRESS NOTES
Subjective:       Patient ID: Kaylee Romero is a 74 y.o. female.    Chief Complaint: Diabetes Mellitus (A1C 11/08/18 9.8 ) and Diabetic Foot Exam (PCP last seen on 12/10/18)    HPI     Kaylee Romero is a 74 y.o. female    has a past medical history of Acid reflux, Arthritis, Cataract, CKD (chronic kidney disease) stage 3, GFR 30-59 ml/min, Diabetes mellitus, type 2, Dry mouth, Hyperlipidemia, Hypertension, KAMRAN (obstructive sleep apnea), and Osteoporosis. Pt presents to clinic for referral from primary for diabetic foot exam. Pt relates she has fungal toe nails and dry skin on the feet.   No other pedal complaints at this time.     PCP:  Liz Roberts MD  Last visit:    Chief Complaint   Patient presents with    Diabetes Mellitus     A1C 11/08/18 9.8     Diabetic Foot Exam     PCP last seen on 12/10/18             Past Medical History:   Diagnosis Date    Acid reflux     Arthritis     Cataract     CKD (chronic kidney disease) stage 3, GFR 30-59 ml/min     Diabetes mellitus, type 2     Dry mouth     Hyperlipidemia 12/2/2014    Hypertension     KAMRAN (obstructive sleep apnea)     Osteoporosis              Current Outpatient Medications on File Prior to Visit   Medication Sig Dispense Refill    alcohol swabs (ALCOHOL PADS) PadM Apply 1 each topically as needed. 11 each 11    amLODIPine (NORVASC) 5 MG tablet Take 1 tablet (5 mg total) by mouth once daily. For Blood Pressure. 90 tablet 1    aspirin (ECOTRIN) 81 MG EC tablet Take 1 tablet (81 mg total) by mouth once daily. 1 tablet 0    blood sugar diagnostic Strp 1 strip by Misc.(Non-Drug; Combo Route) route once daily. 100 strip 3    blood-glucose meter kit Use as instructed 1 each 0    dulaglutide (TRULICITY) 1.5 mg/0.5 mL PnIj Inject 1.5 mg into the skin once a week. 4 Syringe 5    glipiZIDE (GLUCOTROL) 10 MG TR24 Take 1 tablet (10 mg total) by mouth daily with breakfast. 30 tablet 11    hydroCHLOROthiazide (MICROZIDE) 12.5 mg capsule  Take 2 capsules (25 mg total) by mouth once daily. 30 capsule 1    lancets Misc 1 lancet by Misc.(Non-Drug; Combo Route) route once daily. 100 each 3    mupirocin (BACTROBAN) 2 % ointment Apply topically 3 (three) times daily. 30 g 0    simvastatin (ZOCOR) 10 MG tablet Take 1 tablet (10 mg total) by mouth every evening. For Cholesterol. 90 tablet 1    valsartan (DIOVAN) 320 MG tablet Take 1 tablet (320 mg total) by mouth once daily. 90 tablet 1     No current facility-administered medications on file prior to visit.          Review of patient's allergies indicates:   Allergen Reactions    Keflex [cephalexin] Other (See Comments)     Turns orange    Bactrim [sulfamethoxazole-trimethoprim]      Generic version  Sulfa makes her sick         Social History     Socioeconomic History    Marital status:      Spouse name: Not on file    Number of children: 1    Years of education: Not on file    Highest education level: Not on file   Social Needs    Financial resource strain: Not on file    Food insecurity - worry: Not on file    Food insecurity - inability: Not on file    Transportation needs - medical: Not on file    Transportation needs - non-medical: Not on file   Occupational History    Not on file   Tobacco Use    Smoking status: Never Smoker    Smokeless tobacco: Never Used   Substance and Sexual Activity    Alcohol use: Yes     Alcohol/week: 0.0 oz     Comment: social drinker    Drug use: No    Sexual activity: Yes     Partners: Male     Birth control/protection: Post-menopausal   Other Topics Concern    Not on file   Social History Narrative     with a son living locally.  at [employer], Retired 5/18.           Review of Systems   Constitutional: Negative.    HENT: Negative.    Eyes: Negative.    Respiratory: Negative.    Cardiovascular: Negative.    Gastrointestinal: Negative.    Endocrine: Negative.    Genitourinary: Negative.    Musculoskeletal:  "Negative.    Skin: Negative.    Allergic/Immunologic: Negative.    Neurological: Negative.    Hematological: Negative.    Psychiatric/Behavioral: Negative.        Objective:        Vitals:    02/21/19 1611   Weight: 99.3 kg (219 lb)   Height: 5' 2" (1.575 m)         Physical Exam   Constitutional: She is oriented to person, place, and time. She appears well-developed and well-nourished.   Cardiovascular: Intact distal pulses.   DP and PT pulses 2/4 loreta. No edema noted loreta. Capillary refill time < 3 seconds to digits 1-5, loreta.    Musculoskeletal: Normal range of motion. She exhibits deformity. She exhibits no edema or tenderness.   HAV noted to loreta 1st MPJ. 5/5 strength to all LE muscle groups. No POP noted   Neurological: She is alert and oriented to person, place, and time.   Sensation intact to digits, loreta, via SWMF   Skin: Skin is warm and dry. Capillary refill takes 2 to 3 seconds. No erythema.   Toenails x 10 are elongated by 1-3mm, mycotic with subungual debris      Psychiatric: She has a normal mood and affect. Her behavior is normal. Judgment and thought content normal.               Assessment:       Problem List Items Addressed This Visit     None      Visit Diagnoses     Type II diabetes mellitus with neurological manifestations    -  Primary    Relevant Orders    Routine Foot Care    Dermatophytosis of nail        Relevant Orders    Routine Foot Care    Dry skin                Plan:              -I counseled the pt on her condition and management    -Shoe inspection. Diabetic Foot Education. Patient reminded of the importance of good nutrition and blood sugar control to help prevent podiatric complications of diabetes. Patient instructed on proper foot hygeine. We discussed wearing proper shoe gear, daily foot inspections, never walking without protective shoe gear    -Consider Kerasal Nail.  Available OTC.  Use daily for at least 6-8 months on the toenails.     -Gold bond lotion for diabetics for dry " skin on the feet.          Routine Foot Care  Date/Time: 2/21/2019 4:43 PM  Performed by: Donna Gillis DPM  Authorized by: Donna Gillis DPM     Consent Done?:  Yes (Verbal)    Nail Care Type:  Debride  Location(s): All  (Left 1st Toe, Left 3rd Toe, Left 2nd Toe, Left 4th Toe, Left 5th Toe, Right 1st Toe, Right 2nd Toe, Right 3rd Toe, Right 4th Toe and Right 5th Toe)  Patient tolerance:  Patient tolerated the procedure well with no immediate complications     With patient's permission, the toenails mentioned above were aggressively reduced and debrided using a nail nipper, removing all offending nail and debris. The patient will continue to monitor the areas daily, inspect the feet, wear protective shoe gear when ambulatory, and moisturizer to maintain skin integrity.               Donna Gillis DPM  NPI: [id number]         [facility] - PODIATRY  [address] [place] [id number]  Dept: [phone]  Dept Fax: [phone]

## 2019-02-21 NOTE — PROGRESS NOTES
HPI     Patient here for chalazion evaluation. Referred by Dr. Pelayo at   [facility]. Patient c/o of cloud vision, droopy eyelid od. Patient   is unsure how long lesion has been present. Patient stopped using   doxycyline, maxitrol and warm compresses about 3 weeks ago. Patient states   that warm compresses made lesion get bigger. Patient states that she has   chalazion before on left eye and right eye.    Eye meds: none    No previous ocular, nasal, facial sx.    Hx of dermatochalasis ou, cataracts ou, MGD ou, chalazion os, TIAs,   diabetes, hypertension.    Last edited by Samuel Vasquez on 2/21/2019  2:26 PM. (History)            Assessment /Plan     For exam results, see Encounter Report.    Chalazion left upper eyelid    History of TIA (transient ischemic attack)    Hypertensive heart disease with diastolic heart failure    CKD (chronic kidney disease) stage 3, GFR 30-59 ml/min      Patient is a pleasant 74-year-old female with history of meibomian gland dysfunction with a left upper eyelid chalazion that has not responded to warm compresses and doxycycline.    Return in 1 week for I&D in the minor procedure room.    Informed consent obtained after extensive risks/benefits/alternatives were discussed with the patient including but not limited to pain, bleeding, infection, ocular injury, loss of the eye, asymmetry, need for revision in future, scarring.  Alternatives such as waiting were discussed.  All questions were answered.      Return for surgery

## 2019-02-22 ENCOUNTER — TELEPHONE (OUTPATIENT)
Dept: ORTHOPEDICS | Facility: CLINIC | Age: 74
End: 2019-02-22

## 2019-02-25 ENCOUNTER — PROCEDURE VISIT (OUTPATIENT)
Dept: OPHTHALMOLOGY | Facility: CLINIC | Age: 74
End: 2019-02-25
Payer: MEDICARE

## 2019-02-25 ENCOUNTER — TELEPHONE (OUTPATIENT)
Dept: OPHTHALMOLOGY | Facility: CLINIC | Age: 74
End: 2019-02-25

## 2019-02-25 VITALS — HEART RATE: 65 BPM | DIASTOLIC BLOOD PRESSURE: 94 MMHG | SYSTOLIC BLOOD PRESSURE: 154 MMHG

## 2019-02-25 DIAGNOSIS — Z12.11 ENCOUNTER FOR FECAL IMMUNOCHEMICAL TEST SCREENING: Primary | ICD-10-CM

## 2019-02-25 DIAGNOSIS — Z86.73 HISTORY OF TIA (TRANSIENT ISCHEMIC ATTACK): ICD-10-CM

## 2019-02-25 DIAGNOSIS — H00.14 CHALAZION LEFT UPPER EYELID: Primary | ICD-10-CM

## 2019-02-25 DIAGNOSIS — E11.8 TYPE 2 DIABETES MELLITUS WITH COMPLICATION, WITHOUT LONG-TERM CURRENT USE OF INSULIN: ICD-10-CM

## 2019-02-25 DIAGNOSIS — E11.8 TYPE 2 DIABETES MELLITUS WITH COMPLICATION, WITHOUT LONG-TERM CURRENT USE OF INSULIN: Primary | ICD-10-CM

## 2019-02-25 PROCEDURE — 99499 UNLISTED E&M SERVICE: CPT | Mod: S$GLB,,, | Performed by: OPHTHALMOLOGY

## 2019-02-25 PROCEDURE — 99499 NO LOS: ICD-10-PCS | Mod: S$GLB,,, | Performed by: OPHTHALMOLOGY

## 2019-02-25 PROCEDURE — 67800 PR EXCIS CHALAZION,SINGLE: ICD-10-PCS | Mod: E1,S$GLB,, | Performed by: OPHTHALMOLOGY

## 2019-02-25 PROCEDURE — 67800 REMOVE EYELID LESION: CPT | Mod: E1,S$GLB,, | Performed by: OPHTHALMOLOGY

## 2019-02-25 RX ORDER — HYDROCODONE BITARTRATE AND ACETAMINOPHEN 5; 325 MG/1; MG/1
1 TABLET ORAL EVERY 6 HOURS PRN
Qty: 16 TABLET | Refills: 0 | Status: SHIPPED | OUTPATIENT
Start: 2019-02-25 | End: 2019-03-28

## 2019-02-25 RX ORDER — TOBRAMYCIN AND DEXAMETHASONE 3; 1 MG/ML; MG/ML
1 SUSPENSION/ DROPS OPHTHALMIC 4 TIMES DAILY
Qty: 5 ML | Refills: 0 | Status: SHIPPED | OUTPATIENT
Start: 2019-02-25 | End: 2019-03-07

## 2019-02-25 NOTE — PROGRESS NOTES
HPI     Patient here for chalazion removal with Dr. Paz White. Referred by Dr. Pelayo at [facility]. Patient c/o of cloud vision, droopy eyelid   od. Patient is unsure how long lesion has been present. Patient stopped   using doxycyline, maxitrol and warm compresses about 3 weeks ago. Patient   states that warm compresses made lesion get bigger. Patient states that   she has chalazion before on left eye and right eye.    Eye meds: none    No previous ocular, nasal, facial sx.    Hx of dermatochalasis ou, cataracts ou, MGD ou, chalazion os, TIAs,   diabetes, hypertension.    Last edited by Samuel Vasquez on 2/25/2019  4:15 PM. (History)            Assessment /Plan     For exam results, see Encounter Report.    Chalazion left upper eyelid    History of TIA (transient ischemic attack)    Type 2 diabetes mellitus with complication, without long-term current use of insulin    Other orders  -     HYDROcodone-acetaminophen (NORCO) 5-325 mg per tablet; Take 1 tablet by mouth every 6 (six) hours as needed for Pain.  Dispense: 16 tablet; Refill: 0  -     tobramycin-dexamethasone 0.3-0.1% (TOBRADEX) 0.3-0.1 % DrpS; Place 1 drop into the left eye 4 (four) times daily. for 10 days  Dispense: 5 mL; Refill: 0      Here for I and D.     Informed consent obtained after extensive risks/benefits/alternatives were discussed with the patient including but not limited to pain, bleeding, infection, ocular injury, loss of the eye, asymmetry, need for revision in future, scarring.  Alternatives such as waiting were discussed.  All questions were answered.        Procedure Note     Attending: Paz White  Resident: Juan Ramon Lacy   Pre-op Dx: left upper eyelid chalazion  Post-op Dx: same  Local: 2% lidocaine with epinephrine and  4% NaHCO3  Specimens:none      Complications: None  Blood Loss: minimal     The patient was prepped and draped in the usual sterile manner for ophthalmic plastic surgery.  A chalazion clamp was placed  over the chalazion. 2 cc of local anesthesia was given to the left upper eyelid. The chalazion clamp was placed on the eyelid and everted. An 11 blade was used to incise the tarsus and conjunctiva. The contents were expressed with curettes, and the capsule was excised with adrianne scissors. Hemostasis was obtained with high-temp cautery. Tobradex ointment was applied to the wound. The patient tolerated the procedure well. There were no complications.      Post-operative instructions were given to the patient.      Return as needed

## 2019-02-25 NOTE — PROGRESS NOTES
Last 5 Patient Entered Readings                                      Current 30 Day Average: 164/102     Recent Readings 2/21/2019 2/20/2019 2/19/2019 2/9/2019 2/6/2019    SBP (mmHg) 170 146 171 175 180    DBP (mmHg) 122 89 111 100 110    Pulse 93 95 93 92 95          2/25: Pt called to report that she has the [employer] glucometer that she picked up from the [facility] but it is not transmitting readings.  It appears that she did not sign a Consent Form for the DD.  I am putting in the order for the Program to send her the consent.  I explained this to her  on the phone today.      Will f/u.

## 2019-02-26 ENCOUNTER — PATIENT MESSAGE (OUTPATIENT)
Dept: ADMINISTRATIVE | Facility: OTHER | Age: 74
End: 2019-02-26

## 2019-02-26 ENCOUNTER — OFFICE VISIT (OUTPATIENT)
Dept: ORTHOPEDICS | Facility: CLINIC | Age: 74
End: 2019-02-26
Payer: MEDICARE

## 2019-02-26 ENCOUNTER — CLINICAL SUPPORT (OUTPATIENT)
Dept: REHABILITATION | Facility: HOSPITAL | Age: 74
End: 2019-02-26
Payer: MEDICARE

## 2019-02-26 VITALS
WEIGHT: 219 LBS | DIASTOLIC BLOOD PRESSURE: 95 MMHG | BODY MASS INDEX: 40.3 KG/M2 | HEIGHT: 62 IN | SYSTOLIC BLOOD PRESSURE: 160 MMHG | HEART RATE: 55 BPM

## 2019-02-26 DIAGNOSIS — M75.42 SHOULDER IMPINGEMENT SYNDROME, LEFT: Primary | ICD-10-CM

## 2019-02-26 DIAGNOSIS — M75.42 SHOULDER IMPINGEMENT SYNDROME, LEFT: ICD-10-CM

## 2019-02-26 DIAGNOSIS — G89.29 CHRONIC LEFT SHOULDER PAIN: ICD-10-CM

## 2019-02-26 DIAGNOSIS — M25.512 CHRONIC LEFT SHOULDER PAIN: ICD-10-CM

## 2019-02-26 PROCEDURE — 99213 OFFICE O/P EST LOW 20 MIN: CPT | Mod: 25,S$GLB,, | Performed by: PHYSICIAN ASSISTANT

## 2019-02-26 PROCEDURE — 99214 OFFICE O/P EST MOD 30 MIN: CPT | Mod: PBBFAC,25 | Performed by: PHYSICIAN ASSISTANT

## 2019-02-26 PROCEDURE — 99213 PR OFFICE/OUTPT VISIT, EST, LEVL III, 20-29 MIN: ICD-10-PCS | Mod: 25,S$GLB,, | Performed by: PHYSICIAN ASSISTANT

## 2019-02-26 PROCEDURE — 20610 PR DRAIN/INJECT LARGE JOINT/BURSA: ICD-10-PCS | Mod: LT,S$GLB,, | Performed by: PHYSICIAN ASSISTANT

## 2019-02-26 PROCEDURE — 20610 DRAIN/INJ JOINT/BURSA W/O US: CPT | Mod: LT,S$GLB,, | Performed by: PHYSICIAN ASSISTANT

## 2019-02-26 PROCEDURE — 99999 PR PBB SHADOW E&M-EST. PATIENT-LVL IV: CPT | Mod: PBBFAC,,, | Performed by: PHYSICIAN ASSISTANT

## 2019-02-26 PROCEDURE — 20610 DRAIN/INJ JOINT/BURSA W/O US: CPT | Mod: PBBFAC,LT | Performed by: PHYSICIAN ASSISTANT

## 2019-02-26 PROCEDURE — 97110 THERAPEUTIC EXERCISES: CPT | Mod: PO

## 2019-02-26 PROCEDURE — 97140 MANUAL THERAPY 1/> REGIONS: CPT | Mod: PO

## 2019-02-26 PROCEDURE — 99999 PR PBB SHADOW E&M-EST. PATIENT-LVL IV: ICD-10-PCS | Mod: PBBFAC,,, | Performed by: PHYSICIAN ASSISTANT

## 2019-02-26 RX ORDER — TRIAMCINOLONE ACETONIDE 40 MG/ML
80 INJECTION, SUSPENSION INTRA-ARTICULAR; INTRAMUSCULAR
Status: COMPLETED | OUTPATIENT
Start: 2019-02-26 | End: 2019-02-26

## 2019-02-26 RX ORDER — BUPIVACAINE HYDROCHLORIDE 2.5 MG/ML
4 INJECTION, SOLUTION INFILTRATION; PERINEURAL
Status: COMPLETED | OUTPATIENT
Start: 2019-02-26 | End: 2019-02-26

## 2019-02-26 RX ADMIN — TRIAMCINOLONE ACETONIDE 80 MG: 40 INJECTION, SUSPENSION INTRA-ARTICULAR; INTRAMUSCULAR at 03:02

## 2019-02-26 RX ADMIN — BUPIVACAINE HYDROCHLORIDE 10 MG: 2.5 INJECTION, SOLUTION INFILTRATION; PERINEURAL at 03:02

## 2019-02-26 NOTE — PROGRESS NOTES
"Subjective:      Patient ID: Kaylee Romero is a 74 y.o. female.    Chief Complaint: Pain of the Left Shoulder      HPI  Kaylee Romero is a right hand dominant 74 y.o. female presenting today for follow up of left shoulder pain.  She underwent left shoulder injection at her visit in 10/2018, it did make the pain "more manageable" for the past 3 months.  She reports the pain has been 5-7/10; but over the past 3 to 4 days it has been 8-9/10.  She does occasionally take Tylenol use medicated creams for her pain, they do provide some relief.  She is regularly attending therapy.  She presents today requesting a repeat steroid injection. She reports that she is going out of the country for 2 weeks starting next week.  There was a history of trauma.  Onset of symptoms began 9/21/18 when she fell down the steps and landed on the left shoulder and chest.      Her blood pressure is again elevated today.        Review of patient's allergies indicates:   Allergen Reactions    Keflex [cephalexin] Other (See Comments)     Turns orange    Bactrim [sulfamethoxazole-trimethoprim]      Generic version  Sulfa makes her sick         Current Outpatient Medications   Medication Sig Dispense Refill    alcohol swabs (ALCOHOL PADS) PadM Apply 1 each topically as needed. 11 each 11    amLODIPine (NORVASC) 5 MG tablet Take 1 tablet (5 mg total) by mouth once daily. For Blood Pressure. 90 tablet 1    aspirin (ECOTRIN) 81 MG EC tablet Take 1 tablet (81 mg total) by mouth once daily. 1 tablet 0    blood sugar diagnostic Strp 1 strip by Misc.(Non-Drug; Combo Route) route once daily. 100 strip 3    blood-glucose meter kit Use as instructed 1 each 0    dulaglutide (TRULICITY) 1.5 mg/0.5 mL PnIj Inject 1.5 mg into the skin once a week. 4 Syringe 5    glipiZIDE (GLUCOTROL) 10 MG TR24 Take 1 tablet (10 mg total) by mouth daily with breakfast. 30 tablet 11    hydroCHLOROthiazide (MICROZIDE) 12.5 mg capsule Take 2 capsules (25 mg " "total) by mouth once daily. 30 capsule 1    HYDROcodone-acetaminophen (NORCO) 5-325 mg per tablet Take 1 tablet by mouth every 6 (six) hours as needed for Pain. 16 tablet 0    lancets Misc 1 lancet by Misc.(Non-Drug; Combo Route) route once daily. 100 each 3    mupirocin (BACTROBAN) 2 % ointment Apply topically 3 (three) times daily. 30 g 0    simvastatin (ZOCOR) 10 MG tablet Take 1 tablet (10 mg total) by mouth every evening. For Cholesterol. 90 tablet 1    tobramycin-dexamethasone 0.3-0.1% (TOBRADEX) 0.3-0.1 % DrpS Place 1 drop into the left eye 4 (four) times daily. for 10 days 5 mL 0    valsartan (DIOVAN) 320 MG tablet Take 1 tablet (320 mg total) by mouth once daily. 90 tablet 1     No current facility-administered medications for this visit.        Past Medical History:   Diagnosis Date    Acid reflux     Arthritis     Cataract     CKD (chronic kidney disease) stage 3, GFR 30-59 ml/min     Diabetes mellitus, type 2     Dry mouth     Hyperlipidemia 2014    Hypertension     KAMRAN (obstructive sleep apnea)     Osteoporosis        Past Surgical History:   Procedure Laterality Date    ankle surgery (l)      APPENDECTOMY       SECTION      DILATION AND CURETTAGE OF UTERUS      KNEE ARTHROSCOPY W/ DEBRIDEMENT      KNEE SURGERY Left 2017    TKR    REPLACEMENT-KNEE-TOTAL Left 2017    Performed by [facility] Jr[facility] MD at [facility] OR [address] [place] [address]         Review of Systems:  Review of Systems   Constitution: Negative for chills and fever.   Skin: Negative for rash and suspicious lesions.   Musculoskeletal:        See HPI   Neurological: Negative for dizziness, headaches, light-headedness, numbness and paresthesias.   Psychiatric/Behavioral: Negative for depression. The patient is not nervous/anxious.          OBJECTIVE:     PHYSICAL EXAM:  Height: 5' 2" (157.5 cm) Weight: 99.3 kg (219 lb)  Vitals:    19 1504   BP: (!) 160/95   Pulse: (!) 55   Weight: 99.3 kg (219 lb)   Height: " "5' 2" (1.575 m)   PainSc:   5     General    Vitals reviewed.  Constitutional: She is oriented to person, place, and time. She appears well-developed and well-nourished.   HENT:   Head: Normocephalic and atraumatic.   Neck: Normal range of motion.   Cardiovascular: Normal rate.    Pulmonary/Chest: Effort normal. No respiratory distress.   Neurological: She is alert and oriented to person, place, and time.   Psychiatric: She has a normal mood and affect. Her behavior is normal. Judgment and thought content normal.             Musculoskeletal:  No scars or edema appreciated.  No ecchymosis.  She is tender to palpation over the posterior aspect of the left shoulder.  Otherwise nontender.  She has decreased left shoulder range of motion, also has pain with left shoulder motion. Significant decrease in left shoulder internal rotation, to the level of L4.  Decreased ER, forward flexion to 130° and abduction to approximately 90°.  Positive impingement sign on the left.  No weakness. Neurovascularly intact-good sensation and motor function, good capillary refill, 2+ radial pulses.    RADIOGRAPHS:  Left Shoulder XRay, 10/4/18  FINDINGS:  Mild degenerative changes at the acromioclavicular joint can be seen.  No soft tissue calcification is identified.  Bony structures are intact.      Impression     See above     Comments: I have personally reviewed the imaging and I agree with the above radiologist's report.    ASSESSMENT/PLAN:   Kaylee was seen today for pain.    Diagnoses and all orders for this visit:    Shoulder impingement syndrome, left    Chronic left shoulder pain    Other orders  -     triamcinolone acetonide injection 80 mg  -     bupivacaine 0.25% (2.5 mg/ml) injection 10 mg           - We talked at length about the anatomy and pathophysiology of   Encounter Diagnoses   Name Primary?    Shoulder impingement syndrome, left Yes    Chronic left shoulder pain        - discussed patient's symptoms and physical exam " findings, discussed shoulder impingement and pain. Discussed treatment options including NSAIDs, heat/ice, compound cream, steroid injection, therapy, and surgery. She is interested in repeat steroid injection today  - continue physical therapy and HEP  - follow-up in 8-12 weeks if not improved  - call with questions or concerns  - recommend follow-up with primary care, patient should discuss her elevated blood pressure and lack of compliance with hypertension medication.      PROCEDURE NOTE:  I have explained the risks, benefits, and alternatives of the procedure in detail.  The patient voices understanding and all questions have been answered.  The patient agrees to proceed as planned, consents to injection. Pause for timeout. After a sterile prep of the skin performed in the normal fashion, the left shoulder is injected from the posterior approach using a 22 gauge needle with a combination of 4 cc 0.25% marcaine and 80 mg of kenalog. The patient is cautioned and immediate relief of pain is secondary to the local anesthetic and will be temporary.  After the anesthetic wears off there may be a increase in pain that may last for a few hours or a few days and they should use ice to help alleviate this flair up of pain.       Disclaimer: This note has been generated using voice-recognition software. There may be typographical errors that have been missed during proof-reading.

## 2019-02-26 NOTE — PROGRESS NOTES
"  Physical Therapy Daily Treatment Note     Name: Kaylee Urena Seagrove  Clinic Number: [id number]    Therapy Diagnosis:   Encounter Diagnosis   Name Primary?    Shoulder impingement syndrome, left      Physician: Elvia Whitehead PA    Visit Date: 2019     Physician Orders: PT Eval and Treat L shoulder pain  Medical Diagnosis from Referral: R shoulder impingement  Evaluation Date: 2019  Authorization Period Expiration: 20  Plan of Care Expiration: 19  Visit # / Visits authorized:     Time In: 1:00 pm  Time Out: 2:00 pm  Total Billable Time: 40 minutes (TE-2, MT-1)    Precautions: Standard, TIA 18, DM II, uncontrolled HTN    Subjective     Pt reports: she had increased pain over the weekend and continues to have trouble sleeping due to pain in her shoulder. Pt stated she is doing well currently with minimal pain .      Pain: 3-4/10  Location: L shoulder    Objective     Pt received moist hot pack to L shoulder for tissue extensibility/ pain modulation for 0 min- NP    BP in R UE in sittin/ 94    Kaylee received therapeutic exercises to develop strength, endurance and ROM for 40 minutes including:    Supine    Dowel OH flexion w/ 3# 2x20  Dowel protraction w/ 3# 2x20  Dowel ER (towel roll under elbow) 2x20    Standing  Isometric 3 sec holds ( flexion, ER, IR, extension) x 15 - NP  YTB I's 2x15 YTB  YTB rows 2x15 YTB  Protraction plank at wall 2x10  Isometric IR/ER walkouts YTB: 2x10 B (cues)      Scap retraction/ depression 3 sec holds 5" 2x10    Seated:  Flexion forearm slides: 3x10    Kaylee received the following manual therapy techniques: Joint mobilizations, Manual traction, Myofacial release and Soft tissue Mobilization were applied to the: L shoulder for 10 minutes, including:  STM to pec minor, pec major, deltoid  Post GH glide Gr II-III  Inf GH glide Gr III-IV  PROM within pain free range: flexion, ER and IR     Kaylee received cold pack for inflammation control/pain modulation to " the L shoulder 10 minutes    Home Exercises Provided and Patient Education Provided     Education provided:   - Importance of completing HEP; continued monitoring of BP, and proper hydration     Written Home Exercises Provided: Patient instructed to cont prior HEP; w/ addition of YTB I's and rows  Exercises were reviewed and Kaylee was able to demonstrate them prior to the end of the session.  Kaylee demonstrated good  understanding of the education provided.     Assessment     Pt able to complete session and addition of IR/ER walkouts with no increased pain. Pt slowing beginning to progress with strength and ability to tolerate more movement, although continuing to be limited with exercises 2/2 HTN. Pt instructed on proper breathing techniques and to avoid valsalva maneuver with exercises.   Kaylee is progressing well towards her goals.   Pt prognosis is Good.    Pt will continue to benefit from skilled outpatient physical therapy to address the deficits listed in the problem list box on initial evaluation, provide pt/family education and to maximize pt's level of independence in the home and community environment.   Pt's spiritual, cultural and educational needs considered and pt agreeable to plan of care and goals.    Goals:   Short Term Goals: 3 weeks   1. Pt will be independent with HEP, demonstrate good form and report participating in walking program for at least 15 minutes/day 5 days/week  2. Pt will demonstrate pain free PROM of the L shoulder of at least 140 deg flexion, 110 deg abduction and 50 degrees of ER/IR @90 deg abduction to demonstrate improved functional mobility  3. Pt will report good compliance with prescribed medications, will report drinking at least 60 ounces of water/day and will report one healthy dietary change in order to improve tolerance to physical activity and reduce the risk of chest pain on exertion     Long Term Goals: 10 weeks   1. Pt will demonstrate full/symmetrical AROM to bilat  shoulders reporting no pain at end range to demonstrate improved functional mobility  2. Pt will be able to independently don/doff shirt/ bra strap without reports of pain to maximize pt's independence   3. Pt will be able to lift 5# overhead 5x and be able to carry 15# 25ft with L UE reporting less than 2/10 pain in order to demonstrate improved shoulder strength and function.    Plan     Continue POC. Progress as tolerated.     Angi Smyth, PTA

## 2019-02-27 ENCOUNTER — PATIENT MESSAGE (OUTPATIENT)
Dept: OTHER | Facility: OTHER | Age: 74
End: 2019-02-27

## 2019-02-27 ENCOUNTER — PATIENT OUTREACH (OUTPATIENT)
Dept: OTHER | Facility: OTHER | Age: 74
End: 2019-02-27

## 2019-02-27 DIAGNOSIS — E11.9 TYPE 2 DIABETES MELLITUS: ICD-10-CM

## 2019-02-27 NOTE — PROGRESS NOTES
"Last 5 Patient Entered Readings                                      Current 30 Day Average: 165/99     Recent Readings 2/27/2019 2/27/2019 2/26/2019 2/26/2019 2/21/2019    SBP (mmHg) 187 187 157 157 170    DBP (mmHg) 93 93 95 95 122    Pulse 90 90 93 93 93          Digital Medicine: Health  Follow Up    Lifestyle Modifications:    1.Dietary Modifications (Sodium intake <2,000mg/day, food labels, dining out): deferred    2.Physical Activity: deferred    3.Medication Therapy: Patient has been compliant with the medication regimen.    4.Patient has the following medication side effects/concerns: none.  Reports high reading today 187/93 mmHg was before medication.  (Frequency/Alleviating factors/Precipitating factors, etc.)     Follow up with . Kaylee Urena Nick completed. No further questions or concerns. Will continue to follow up to achieve health goals.    Patient states she is "tired of answering all of these questionnaires."  Reports she is "aggravated" by her readings not submitting.  Patient's blood glucose readings are submitting.  States she took one today, but did not transmit.  States she is going out of the country for 2 weeks (March 5-19.)  Placing patient on hiatus during this time.  "

## 2019-02-28 ENCOUNTER — CLINICAL SUPPORT (OUTPATIENT)
Dept: REHABILITATION | Facility: HOSPITAL | Age: 74
End: 2019-02-28
Payer: MEDICARE

## 2019-02-28 DIAGNOSIS — M75.42 SHOULDER IMPINGEMENT SYNDROME, LEFT: ICD-10-CM

## 2019-02-28 PROCEDURE — 97110 THERAPEUTIC EXERCISES: CPT | Mod: PO

## 2019-02-28 NOTE — PROGRESS NOTES
Re-assessment    Physical Therapy Daily Treatment Note     Name: Kaylee Romero  Clinic Number: [id number]    Therapy Diagnosis:   No diagnosis found.  Physician: Elvia Whitehead PA    Visit Date: 2/28/2019     Physician Orders: PT Eval and Treat L shoulder pain  Medical Diagnosis from Referral: R shoulder impingement  Evaluation Date: 1/21/2019  Authorization Period Expiration: 1/7/20  Plan of Care Expiration: 4/21/19  Visit # / Visits authorized: 9/12    Time In: 1:00 pm  Time Out: 2:00 pm  Total Billable Time: 60 minutes (TE-3, MT-0)    Precautions: Standard, TIA 11/4/18, DM II, uncontrolled HTN    Subjective     Pt reports: since initial evaluation she feels her shoulder has improved, her ROM is greater than when she started. Pt feels exercises are working. Pt reports there are longer periods of comfort less time in discomfort.  Pt reports she hooked her own bra today which hasn't happened in some time. Pt is trying to be more adherent to regular hydration and is hoping to incorporate walking on the treadmill more throughout the week     Pt reports her pain escalated over the weekend, may have aggreavated it a week before by not paying attention to it during ADLs.    Pain: 3/10  Location: L shoulder    Objective     Objective measures taken today to evaluate progress    BP taken pre- evaluation: 172/92 in L UE while sitting  BP taken following 10 min of moist hot pack on L shoulder:  148/90 in L UE while sitting  BP taken following treatment: 170/92 in L UE while sitting     R shoulder AROM/PROM WNL and pain free  AROM Left Comment Normal   Shoulder Flexion: 180 degrees   180 deg   Shoulder Extension: 60 degrees  60 deg   Shoulder Abduction: 180 degrees  pain at end range 180 deg   Shoulder ER: @90 43 degrees P! 90 deg   Shoulder IR: @90 40 degrees P! 90 deg   PROM Left Comment Normal   Shoulder Flexion:  180degrees  180 deg   Shoulder Abduction: 180 degrees   180 deg   Shoulder ER, 90°ABD: 90 degrees  P!  90  "deg   Shoulder IR, 90° ABD: 55  degrees  P! 90 deg   *denotes pain     MMT    Right Left Comment   Shoulder flexion: 4+/5 4-/5  P! On L   Shoulder Abduction: 4+/5 4-/5  P! On L   Shoulder ER: 4+/5 4/5     Shoulder IR: 4+/5 4-/5  "effort but no pain"      Special Tests     - Neers: left positive  - Arzate-Henry: left positive  - Lift-off: left positive  - Drop Arm: left negative  - Full/Empty Can: left positive  - Biceps Load I/II: left negative  - Scour left negative     CMS Impairment/Limitation/Restriction for FOTO Shoulder Survey     Therapist reviewed FOTO scores for Kaylee Romero on 1/21/2019.   FOTO documents entered into [employer] - see Media section.     Limitation Score: 43%  Category: Mobility     Current : CK = at least 40% but < 60% impaired, limited or restricted  Goal: CJ = at least 20% but < 40% impaired, limited or restricted           Pt received moist hot pack to L shoulder for tissue extensibility/ pain modulation for 10 min    Kaylee received therapeutic exercises to develop strength, endurance and ROM for 50 minutes including:    Supine    Dowel OH flexion w/ 3# 2x20  Dowel protraction w/ 3# 2x20  Dowel ER (towel roll under elbow) 2x20    Seated    Diaphragmatic breathing w/ towel for cuing      Standing  Isometric 3 sec holds ( flexion, ER, IR, extension) x 15 - NP  YTB I's 2x15 YTB  YTB rows 2x15 YTB  Protraction plank at wall 2x10  Foam roller serratus wall slide 2x10  Isometric IR/ER walkouts YTB: 2x10 B (cues)  Low doorway pec stretch 2x30 sec      Scap retraction/ depression 3 sec holds 5" 2x10    Seated:  Flexion forearm slides: 3x10    Kaylee received the following manual therapy techniques: Joint mobilizations, Manual traction, Myofacial release and Soft tissue Mobilization were applied to the: L shoulder for 5 minutes, including:  STM to pec minor, pec major, deltoid  Post GH glide Gr II-III  Inf GH glide Gr III-IV  PROM within pain free range: flexion, ER and IR     Kaylee received " cold pack for inflammation control/pain modulation to the L shoulder 10 minutes    Home Exercises Provided and Patient Education Provided     Education provided:   - Importance of completing HEP; continued monitoring of BP, and proper hydration     Written Home Exercises Provided: Patient instructed to cont prior HEP; w/ addition of pillow case slides, doorway pec stretch, diaphragmatic breathing  Exercises were reviewed and Kaylee was able to demonstrate them prior to the end of the session.  Kaylee demonstrated good  understanding of the education provided.     Assessment     Pt presents within significant subjective and objective gains since initial evaluation. Discussed with pt the importance of getting blood pressure and blood glucose under control as how it relates to the microvasculature/ organ health. Pt demonstrates good understanding. Pt's ROM is near full functional ROM, pt will continue to benefit from strengthening as tolerated. Pt reports she will be out of the country for about 2 weeks but will try to continue working exercises while away.    Kaylee is progressing well towards her goals.   Pt prognosis is Good.    Pt will continue to benefit from skilled outpatient physical therapy to address the deficits listed in the problem list box on initial evaluation, provide pt/family education and to maximize pt's level of independence in the home and community environment.   Pt's spiritual, cultural and educational needs considered and pt agreeable to plan of care and goals.    Goals:   Short Term Goals: 3 weeks   1. Pt will be independent with HEP, demonstrate good form and report participating in walking program for at least 15 minutes/day 5 days/week - progressing, consistent with HEP will work on getting more walking throughout the week  2. Pt will demonstrate pain free PROM of the L shoulder of at least 140 deg flexion, 110 deg abduction and 50 degrees of ER/IR @90 deg abduction to demonstrate improved  functional mobility - met  3. Pt will report good compliance with prescribed medications, will report drinking at least 60 ounces of water/day and will report one healthy dietary change in order to improve tolerance to physical activity and reduce the risk of chest pain on exertion - progressing hydration improved and reports good compliance with medications     Long Term Goals: 10 weeks   1. Pt will demonstrate full/symmetrical AROM to bilat shoulders reporting no pain at end range to demonstrate improved functional mobility  2. Pt will be able to independently don/doff shirt/ bra strap without reports of pain to maximize pt's independence   3. Pt will be able to lift 5# overhead 5x and be able to carry 15# 25ft with L UE reporting less than 2/10 pain in order to demonstrate improved shoulder strength and function.    Plan     Next: try modified prone strengthening,     Continue POC. Progress as tolerated.     Leonard Germain, PT

## 2019-03-20 NOTE — PROGRESS NOTES
Last 5 Patient Entered Readings                                      Current 30 Day Average: 166/102     Recent Readings 2/27/2019 2/27/2019 2/26/2019 2/26/2019 2/21/2019    SBP (mmHg) 187 187 157 157 170    DBP (mmHg) 93 93 95 95 122    Pulse 90 90 93 93 93          Last 6 Patient Entered Readings                                          Most Recent A1c: 9.8% on 11/8/2018  (Goal: 7%)     Recent Readings 2/21/2019 2/20/2019 2/19/2019 2/9/2019 2/6/2019    Blood Glucose (mg/dL) 222 242 149 124 149          3/20: Reviewed chart.  Pt was on hiatus for 1 month.  No recent readings.  Will postpone call.

## 2019-03-21 ENCOUNTER — OFFICE VISIT (OUTPATIENT)
Dept: SLEEP MEDICINE | Facility: CLINIC | Age: 74
End: 2019-03-21
Payer: MEDICARE

## 2019-03-21 VITALS
WEIGHT: 228.19 LBS | BODY MASS INDEX: 41.99 KG/M2 | DIASTOLIC BLOOD PRESSURE: 100 MMHG | HEART RATE: 98 BPM | SYSTOLIC BLOOD PRESSURE: 164 MMHG | HEIGHT: 62 IN

## 2019-03-21 DIAGNOSIS — G47.33 OBSTRUCTIVE SLEEP APNEA: Primary | ICD-10-CM

## 2019-03-21 DIAGNOSIS — E66.01 SEVERE OBESITY (BMI >= 40): ICD-10-CM

## 2019-03-21 PROCEDURE — 99499 UNLISTED E&M SERVICE: CPT | Mod: S$GLB,,, | Performed by: NURSE PRACTITIONER

## 2019-03-21 PROCEDURE — 3080F PR MOST RECENT DIASTOLIC BLOOD PRESSURE >= 90 MM HG: ICD-10-PCS | Mod: CPTII,S$GLB,, | Performed by: NURSE PRACTITIONER

## 2019-03-21 PROCEDURE — 99214 OFFICE O/P EST MOD 30 MIN: CPT | Mod: S$GLB,,, | Performed by: NURSE PRACTITIONER

## 2019-03-21 PROCEDURE — 3077F PR MOST RECENT SYSTOLIC BLOOD PRESSURE >= 140 MM HG: ICD-10-PCS | Mod: CPTII,S$GLB,, | Performed by: NURSE PRACTITIONER

## 2019-03-21 PROCEDURE — 3077F SYST BP >= 140 MM HG: CPT | Mod: CPTII,S$GLB,, | Performed by: NURSE PRACTITIONER

## 2019-03-21 PROCEDURE — 99999 PR PBB SHADOW E&M-EST. PATIENT-LVL IV: ICD-10-PCS | Mod: PBBFAC,,, | Performed by: NURSE PRACTITIONER

## 2019-03-21 PROCEDURE — 99499 RISK ADDL DX/OHS AUDIT: ICD-10-PCS | Mod: S$GLB,,, | Performed by: NURSE PRACTITIONER

## 2019-03-21 PROCEDURE — 1101F PT FALLS ASSESS-DOCD LE1/YR: CPT | Mod: CPTII,S$GLB,, | Performed by: NURSE PRACTITIONER

## 2019-03-21 PROCEDURE — 1101F PR PT FALLS ASSESS DOC 0-1 FALLS W/OUT INJ PAST YR: ICD-10-PCS | Mod: CPTII,S$GLB,, | Performed by: NURSE PRACTITIONER

## 2019-03-21 PROCEDURE — 99214 PR OFFICE/OUTPT VISIT, EST, LEVL IV, 30-39 MIN: ICD-10-PCS | Mod: S$GLB,,, | Performed by: NURSE PRACTITIONER

## 2019-03-21 PROCEDURE — 99999 PR PBB SHADOW E&M-EST. PATIENT-LVL IV: CPT | Mod: PBBFAC,,, | Performed by: NURSE PRACTITIONER

## 2019-03-21 PROCEDURE — 3080F DIAST BP >= 90 MM HG: CPT | Mod: CPTII,S$GLB,, | Performed by: NURSE PRACTITIONER

## 2019-03-21 NOTE — PROGRESS NOTES
"Kaylee Romero seen in follow-up for KAMRAN management. Last seen in clinic by Logan MORALES June 2016. This is her initial visit with me.     Continues to use APAP nightly without breakthrough symptoms.   Likes nasal mask. Denies oral/nasal drying, as long as using chinstrap  Denies air hunger  Denies nasal congestion   Denies overt air leaks     Resmed AirSense 10, APAP 11 - 15 cm, 26/30, > 4 hours: 22/30, Ave Usage: 7.5 hours, Used Hours: 195.1 hours, Pressure: 14.4 cm, Leak 18L/min, Predicted AHI: 0.7    07/26/2016 CPAP titration study  218 lb Effective control of sleep disordered breathing was achieved with CPAP at 11-14 cm of water range.   PSG RDI 34.9/low sat 82%, ent eval may be needed if mouth leak persists, cpap 11cm    Review of Systems:   Sleep related symptoms as per HPI.  Otherwise, a balance of 10 systems reviewed is negative.    Physical Exam:   BP (!) 164/100   Pulse 98   Ht 5' 2" (1.575 m)   Wt 103.5 kg (228 lb 2.8 oz)   BMI 41.73 kg/m²     GENERAL: Well groomed    Assessment:     Obstructive sleep apnea, severe by RDI criteria snoring, interrupted sleep, witnessed apneas, and daytime sleepiness,  with prior symptoms of snoring, now resolved with CPAP use. The patient is adherent on CPAP and experiencing symptomatic benefit. Medical co-mobidities: hx TIA, HTN, DM2, and obesity.     Obesity, BMI > 40, discussed relationship between KAMRAN and weight     Plan:     Continue APAP therapy at 11 - 15 cm H2O. Rx updated, Access DME. Provided pt hard copy.     Education: During our discussion today, we talked about the etiology of obstructive sleep apnea as well as the potential ramifications of untreated sleep apnea, which could include daytime sleepiness, hypertension, heart disease and/or stroke. We discussed potential treatment options, which could include weight loss, body positioning, continuous positive airway pressure (CPAP), or referral for surgical consideration.     Behavior modification which " includes losing weight, exercising, changing the sleep position, abstaining from alcohol, and avoiding certain medications    Counseling regarding benefits of healthy eating and physical activity (150 minutes of moderate-intensity aerobic activity per week) for weight reduction which can improve overall health.     Precautions: The patient was advised to abstain from driving should they feel sleepy or drowsy    RTC in 12 months, sooner if needed.

## 2019-03-26 ENCOUNTER — PATIENT MESSAGE (OUTPATIENT)
Dept: ADMINISTRATIVE | Facility: OTHER | Age: 74
End: 2019-03-26

## 2019-03-26 ENCOUNTER — PES CALL (OUTPATIENT)
Dept: ADMINISTRATIVE | Facility: CLINIC | Age: 74
End: 2019-03-26

## 2019-03-27 ENCOUNTER — LAB VISIT (OUTPATIENT)
Dept: LAB | Facility: HOSPITAL | Age: 74
End: 2019-03-27
Attending: INTERNAL MEDICINE
Payer: MEDICARE

## 2019-03-27 DIAGNOSIS — Z12.11 ENCOUNTER FOR FECAL IMMUNOCHEMICAL TEST SCREENING: ICD-10-CM

## 2019-03-27 LAB — HEMOCCULT STL QL IA: NEGATIVE

## 2019-03-27 PROCEDURE — 82274 ASSAY TEST FOR BLOOD FECAL: CPT

## 2019-03-28 ENCOUNTER — OFFICE VISIT (OUTPATIENT)
Dept: INTERNAL MEDICINE | Facility: CLINIC | Age: 74
End: 2019-03-28
Payer: MEDICARE

## 2019-03-28 ENCOUNTER — PATIENT OUTREACH (OUTPATIENT)
Dept: OTHER | Facility: OTHER | Age: 74
End: 2019-03-28

## 2019-03-28 DIAGNOSIS — E11.22 TYPE 2 DIABETES MELLITUS WITH STAGE 3 CHRONIC KIDNEY DISEASE, WITHOUT LONG-TERM CURRENT USE OF INSULIN: Primary | ICD-10-CM

## 2019-03-28 DIAGNOSIS — E66.01 MORBID OBESITY WITH BMI OF 40.0-44.9, ADULT: ICD-10-CM

## 2019-03-28 DIAGNOSIS — I50.30 HYPERTENSIVE HEART DISEASE WITH DIASTOLIC HEART FAILURE: ICD-10-CM

## 2019-03-28 DIAGNOSIS — N18.30 TYPE 2 DIABETES MELLITUS WITH STAGE 3 CHRONIC KIDNEY DISEASE, WITHOUT LONG-TERM CURRENT USE OF INSULIN: Primary | ICD-10-CM

## 2019-03-28 DIAGNOSIS — I11.0 HYPERTENSIVE HEART DISEASE WITH DIASTOLIC HEART FAILURE: ICD-10-CM

## 2019-03-28 DIAGNOSIS — I67.2 ATHEROSCLEROTIC CEREBROVASCULAR DISEASE: ICD-10-CM

## 2019-03-28 PROCEDURE — 3075F PR MOST RECENT SYSTOLIC BLOOD PRESS GE 130-139MM HG: ICD-10-PCS | Mod: CPTII,S$GLB,, | Performed by: INTERNAL MEDICINE

## 2019-03-28 PROCEDURE — 3075F SYST BP GE 130 - 139MM HG: CPT | Mod: CPTII,S$GLB,, | Performed by: INTERNAL MEDICINE

## 2019-03-28 PROCEDURE — 3078F PR MOST RECENT DIASTOLIC BLOOD PRESSURE < 80 MM HG: ICD-10-PCS | Mod: CPTII,S$GLB,, | Performed by: INTERNAL MEDICINE

## 2019-03-28 PROCEDURE — 99214 PR OFFICE/OUTPT VISIT, EST, LEVL IV, 30-39 MIN: ICD-10-PCS | Mod: S$GLB,,, | Performed by: INTERNAL MEDICINE

## 2019-03-28 PROCEDURE — 99214 OFFICE O/P EST MOD 30 MIN: CPT | Mod: S$GLB,,, | Performed by: INTERNAL MEDICINE

## 2019-03-28 PROCEDURE — 3078F DIAST BP <80 MM HG: CPT | Mod: CPTII,S$GLB,, | Performed by: INTERNAL MEDICINE

## 2019-03-28 PROCEDURE — 3046F HEMOGLOBIN A1C LEVEL >9.0%: CPT | Mod: CPTII,S$GLB,, | Performed by: INTERNAL MEDICINE

## 2019-03-28 PROCEDURE — 1101F PT FALLS ASSESS-DOCD LE1/YR: CPT | Mod: CPTII,S$GLB,, | Performed by: INTERNAL MEDICINE

## 2019-03-28 PROCEDURE — 3046F PR MOST RECENT HEMOGLOBIN A1C LEVEL > 9.0%: ICD-10-PCS | Mod: CPTII,S$GLB,, | Performed by: INTERNAL MEDICINE

## 2019-03-28 PROCEDURE — 99999 PR PBB SHADOW E&M-EST. PATIENT-LVL III: ICD-10-PCS | Mod: PBBFAC,,, | Performed by: INTERNAL MEDICINE

## 2019-03-28 PROCEDURE — 99999 PR PBB SHADOW E&M-EST. PATIENT-LVL III: CPT | Mod: PBBFAC,,, | Performed by: INTERNAL MEDICINE

## 2019-03-28 PROCEDURE — 1101F PR PT FALLS ASSESS DOC 0-1 FALLS W/OUT INJ PAST YR: ICD-10-PCS | Mod: CPTII,S$GLB,, | Performed by: INTERNAL MEDICINE

## 2019-03-28 RX ORDER — VALSARTAN 320 MG/1
320 TABLET ORAL DAILY
Qty: 90 TABLET | Refills: 1 | Status: ON HOLD | OUTPATIENT
Start: 2019-03-28 | End: 2019-08-21 | Stop reason: HOSPADM

## 2019-03-28 RX ORDER — AMLODIPINE BESYLATE 10 MG/1
10 TABLET ORAL DAILY
Qty: 90 TABLET | Refills: 1 | Status: ON HOLD | OUTPATIENT
Start: 2019-03-28 | End: 2019-07-12 | Stop reason: HOSPADM

## 2019-03-28 RX ORDER — HYDROCHLOROTHIAZIDE 25 MG/1
25 TABLET ORAL DAILY
Qty: 90 TABLET | Refills: 1 | Status: SHIPPED | OUTPATIENT
Start: 2019-03-28 | End: 2019-05-03 | Stop reason: ALTCHOICE

## 2019-03-28 NOTE — PROGRESS NOTES
Last 5 Patient Entered Readings                                      Current 30 Day Average: 171/96     Recent Readings 3/26/2019 3/26/2019 3/25/2019 3/25/2019 2/27/2019    SBP (mmHg) 167 167 174 174 187    DBP (mmHg) 103 103 94 94 93    Pulse 86 86 93 93 90          Last 6 Patient Entered Readings                                          Most Recent A1c: 9.8% on 11/8/2018  (Goal: 7%)     Recent Readings 3/25/2019 2/21/2019 2/20/2019 2/19/2019 2/9/2019    Blood Glucose (mg/dL) 138 222 242 149 124          3/28: Patient has office visit.  Will call tomorrow.  Reading in office was 138/80 mmHg

## 2019-03-29 ENCOUNTER — PATIENT OUTREACH (OUTPATIENT)
Dept: OTHER | Facility: OTHER | Age: 74
End: 2019-03-29

## 2019-03-29 NOTE — PROGRESS NOTES
Last 5 Patient Entered Readings                                      Current 30 Day Average: 176/97     Recent Readings 3/26/2019 3/26/2019 3/25/2019 3/25/2019 2/27/2019    SBP (mmHg) 167 167 174 174 187    DBP (mmHg) 103 103 94 94 93    Pulse 86 86 93 93 90          Last 6 Patient Entered Readings                                          Most Recent A1c: 9.8% on 11/8/2018  (Goal: 7%)     Recent Readings 3/25/2019 2/21/2019 2/20/2019 2/19/2019 2/9/2019    Blood Glucose (mg/dL) 138 222 242 149 124          Digital Medicine: Health  Follow Up    Lifestyle Modifications:    1.Dietary Modifications (Sodium intake <2,000mg/day, food labels, dining out): Reports no change in diet    2.Physical Activity: No change in PA    3.Medication Therapy: Patient has been compliant with the medication regimen.    4.Patient has the following medication side effects/concerns:   (Frequency/Alleviating factors/Precipitating factors, etc.)     Follow up with Mrs. Kaylee Urena Nick completed. No further questions or concerns. Will continue to follow up to achieve health goal.    States she went to her PCP appt yesterday and her PCP could not see readings except for readings in January.  Informed patient we have readings from February and March.  Went by the [facility] after appt, but did not bring equipment.  States she will bring it on April 11 which is her next appt.

## 2019-03-29 NOTE — PROGRESS NOTES
Last 5 Patient Entered Readings                                      Current 30 Day Average: 169/98     Recent Readings 3/29/2019 3/29/2019 3/26/2019 3/26/2019 3/25/2019    SBP (mmHg) 148 148 167 167 174    DBP (mmHg) 102 102 103 103 94    Pulse 100 100 86 86 93          Last 6 Patient Entered Readings                                          Most Recent A1c: 9.8% on 2018  (Goal: 7%)     Recent Readings 3/25/2019 2019 2019 2019 2019    Blood Glucose (mg/dL) 138 222 242 149 124          3/29: Patient called and LVM stating she is having trouble testing with her new strips.  Returned call and patient states she is getting a message stating that her test strips  on 19.  States she will bring all her supplies to the [facility] on  to get everything fixed.  States she took a reading today right after lunch, which she never does, so disregard reading.  No questions or concerns at this time.

## 2019-03-30 ENCOUNTER — PATIENT MESSAGE (OUTPATIENT)
Dept: ORTHOPEDICS | Facility: CLINIC | Age: 74
End: 2019-03-30

## 2019-03-31 VITALS
HEART RATE: 100 BPM | BODY MASS INDEX: 41.79 KG/M2 | DIASTOLIC BLOOD PRESSURE: 74 MMHG | HEIGHT: 62 IN | WEIGHT: 227.13 LBS | SYSTOLIC BLOOD PRESSURE: 132 MMHG
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
## 2019-04-01 DIAGNOSIS — M25.512 CHRONIC LEFT SHOULDER PAIN: ICD-10-CM

## 2019-04-01 DIAGNOSIS — M75.42 SHOULDER IMPINGEMENT SYNDROME, LEFT: Primary | ICD-10-CM

## 2019-04-01 DIAGNOSIS — G89.29 CHRONIC LEFT SHOULDER PAIN: ICD-10-CM

## 2019-04-02 ENCOUNTER — PATIENT OUTREACH (OUTPATIENT)
Dept: OTHER | Facility: OTHER | Age: 74
End: 2019-04-02

## 2019-04-02 DIAGNOSIS — E11.8 TYPE 2 DIABETES MELLITUS WITH COMPLICATION, WITHOUT LONG-TERM CURRENT USE OF INSULIN: Primary | ICD-10-CM

## 2019-04-02 NOTE — PROGRESS NOTES
"Last 5 Patient Entered Readings                                      Current 30 Day Average: 154/97     Recent Readings 4/2/2019 4/2/2019 3/30/2019 3/30/2019 3/29/2019    SBP (mmHg) 135 135 146 146 148    DBP (mmHg) 91 91 95 95 102    Pulse 113 113 92 92 100        4/2: Spoke to patient.  She visited the [facility] today and was told to charge both the digital BP cuff and glucometer.  BP readings are improving. No med changes planned but can switch from HCTZ to chlorthalidone if needed.    Hypertension Medications             amLODIPine (NORVASC) 10 MG tablet Take 1 tablet (10 mg total) by mouth once daily. For Blood Pressure.    hydroCHLOROthiazide (HYDRODIURIL) 25 MG tablet Take 1 tablet (25 mg total) by mouth once daily.    valsartan (DIOVAN) 320 MG tablet Take 1 tablet (320 mg total) by mouth once daily.            Last 6 Patient Entered Readings                                          Most Recent A1c: 9.8% on 11/8/2018  (Goal: 7%)     Recent Readings 4/2/2019 3/25/2019 2/21/2019 2/20/2019 2/19/2019    Blood Glucose (mg/dL) 193 138 222 242 149          4/2: Pt states that many of her readings are not showing up on the log but "they are all high".  She states she has taken metformin in the past and tolerated it fine but her PCP does not want her on Metformin bc of CKD 3.  Last labs GFR > 30.  I sent message to PCP to ask permission to restart metformin.      Diabetes Medications             dulaglutide (TRULICITY) 1.5 mg/0.5 mL PnIj Inject 1.5 mg into the skin once a week.        Claudia Shields PA-C 4/2/2019  3:38 PM  Takes on Sunday             glipiZIDE (GLUCOTROL) 10 MG TR24 Take 1 tablet (10 mg total) by mouth daily with breakfast.          "

## 2019-04-03 ENCOUNTER — PATIENT MESSAGE (OUTPATIENT)
Dept: OTHER | Facility: OTHER | Age: 74
End: 2019-04-03

## 2019-04-08 ENCOUNTER — PATIENT OUTREACH (OUTPATIENT)
Dept: OTHER | Facility: OTHER | Age: 74
End: 2019-04-08

## 2019-04-08 DIAGNOSIS — I11.0 HYPERTENSIVE HEART DISEASE WITH DIASTOLIC HEART FAILURE: Primary | ICD-10-CM

## 2019-04-08 DIAGNOSIS — I50.30 HYPERTENSIVE HEART DISEASE WITH DIASTOLIC HEART FAILURE: Primary | ICD-10-CM

## 2019-04-08 NOTE — PROGRESS NOTES
"Last 5 Patient Entered Readings                                      Current 30 Day Average: 148/99     Recent Readings 4/8/2019 4/8/2019 4/8/2019 4/8/2019 4/3/2019    SBP (mmHg) 145 145 178 178 120    DBP (mmHg) 116 116 109 109 80    Pulse 99 99 99 99 80        4/8: Called and LM for patient re: HIGH ALERT.  Consider switch from HCTZ to chlorthalidone.     4/10: Spoke to patient.  She states that she went to the [facility] on 4/8 to exchange cuff.  No readings since then.  Pt has lab work tomorrow (4/11).      Hypertension Medications             amLODIPine (NORVASC) 10 MG tablet Take 1 tablet (10 mg total) by mouth once daily. For Blood Pressure.    hydroCHLOROthiazide (HYDRODIURIL) 25 MG tablet Take 1 tablet (25 mg total) by mouth once daily.    valsartan (DIOVAN) 320 MG tablet Take 1 tablet (320 mg total) by mouth once daily.            Last 6 Patient Entered Readings                                          Most Recent A1c: 9.8% on 11/8/2018  (Goal: 7%)     Recent Readings 4/9/2019 4/8/2019 4/8/2019 4/8/2019 4/7/2019    Blood Glucose (mg/dL) 138 123 136 221 203          Diabetes Medications             dulaglutide (TRULICITY) 1.5 mg/0.5 mL PnIj Inject 1.5 mg into the skin once a week.        Claudia Shields PA-C 4/2/2019  3:38 PM  Takes on Sunday             glipiZIDE (GLUCOTROL) 10 MG TR24 Take 1 tablet (10 mg total) by mouth daily with breakfast.        4/8: LM for patient. No response from PCP about metformin.  From January 2019 NP Endocrinology note:  "CKD (chronic kidney disease) stage 3, GFR 30-59 ml/min  -- Last GFR >60, repeat CMP prior to next visit.  -- May restart metformin  if kidney function stable at next visit"    4/10: Spoke to patient.  She has CMP on 4/11.  Will send message to PCP and Endo NP about Metformin.        "

## 2019-04-09 NOTE — PROCEDURES
DATE OF PROCEDURE:  12/28/2018 to 01/27/2019.    At baseline without any symptoms, the rhythm was sinus with single premature   atrial contractions.  There was one complaint of chest pain on 01/25.  The   rhythm at that time showed sinus tachycardia with single premature atrial   contractions with rates between 107 and 109 beats per minute.  There were other   asymptomatic events.  These all showed sinus rhythm with APCs at times as well   as sinus tachycardia at times.  Rates ranged between 84 and 93 beats per minute.    IMPRESSION:  Sinus rhythm/sinus tachycardia with APCs.        HELENE/DAVON  dd: 02/22/2019 17:15:50 (CST)  td: 02/22/2019 19:59:07 (CST)  Doc ID   #[id number]  Job ID #[id number]    CC:

## 2019-04-11 ENCOUNTER — LAB VISIT (OUTPATIENT)
Dept: LAB | Facility: HOSPITAL | Age: 74
End: 2019-04-11
Attending: INTERNAL MEDICINE
Payer: MEDICARE

## 2019-04-11 DIAGNOSIS — I50.30 HYPERTENSIVE HEART DISEASE WITH DIASTOLIC HEART FAILURE: ICD-10-CM

## 2019-04-11 DIAGNOSIS — E11.22 TYPE 2 DIABETES MELLITUS WITH STAGE 3 CHRONIC KIDNEY DISEASE, WITHOUT LONG-TERM CURRENT USE OF INSULIN: ICD-10-CM

## 2019-04-11 DIAGNOSIS — I11.0 HYPERTENSIVE HEART DISEASE WITH DIASTOLIC HEART FAILURE: ICD-10-CM

## 2019-04-11 DIAGNOSIS — N18.30 TYPE 2 DIABETES MELLITUS WITH STAGE 3 CHRONIC KIDNEY DISEASE, WITHOUT LONG-TERM CURRENT USE OF INSULIN: ICD-10-CM

## 2019-04-11 DIAGNOSIS — I67.2 ATHEROSCLEROTIC CEREBROVASCULAR DISEASE: ICD-10-CM

## 2019-04-11 LAB
ALBUMIN SERPL BCP-MCNC: 3.4 G/DL (ref 3.5–5.2)
ALP SERPL-CCNC: 78 U/L (ref 55–135)
ALT SERPL W/O P-5'-P-CCNC: 25 U/L (ref 10–44)
ANION GAP SERPL CALC-SCNC: 6 MMOL/L (ref 8–16)
AST SERPL-CCNC: 20 U/L (ref 10–40)
BILIRUB SERPL-MCNC: 0.4 MG/DL (ref 0.1–1)
BUN SERPL-MCNC: 21 MG/DL (ref 8–23)
CALCIUM SERPL-MCNC: 10.6 MG/DL (ref 8.7–10.5)
CHLORIDE SERPL-SCNC: 104 MMOL/L (ref 95–110)
CHOLEST SERPL-MCNC: 145 MG/DL (ref 120–199)
CHOLEST/HDLC SERPL: 3 {RATIO} (ref 2–5)
CO2 SERPL-SCNC: 29 MMOL/L (ref 23–29)
CREAT SERPL-MCNC: 1.3 MG/DL (ref 0.5–1.4)
EST. GFR  (AFRICAN AMERICAN): 47 ML/MIN/1.73 M^2
EST. GFR  (NON AFRICAN AMERICAN): 41 ML/MIN/1.73 M^2
ESTIMATED AVG GLUCOSE: 203 MG/DL (ref 68–131)
GLUCOSE SERPL-MCNC: 143 MG/DL (ref 70–110)
HBA1C MFR BLD HPLC: 8.7 % (ref 4–5.6)
HDLC SERPL-MCNC: 49 MG/DL (ref 40–75)
HDLC SERPL: 33.8 % (ref 20–50)
LDLC SERPL CALC-MCNC: 83.2 MG/DL (ref 63–159)
NONHDLC SERPL-MCNC: 96 MG/DL
POTASSIUM SERPL-SCNC: 4.7 MMOL/L (ref 3.5–5.1)
PROT SERPL-MCNC: 8.2 G/DL (ref 6–8.4)
SODIUM SERPL-SCNC: 139 MMOL/L (ref 136–145)
TRIGL SERPL-MCNC: 64 MG/DL (ref 30–150)

## 2019-04-11 PROCEDURE — 36415 COLL VENOUS BLD VENIPUNCTURE: CPT

## 2019-04-11 PROCEDURE — 80061 LIPID PANEL: CPT

## 2019-04-11 PROCEDURE — 80053 COMPREHEN METABOLIC PANEL: CPT

## 2019-04-11 PROCEDURE — 83036 HEMOGLOBIN GLYCOSYLATED A1C: CPT

## 2019-04-16 ENCOUNTER — PATIENT OUTREACH (OUTPATIENT)
Dept: OTHER | Facility: OTHER | Age: 74
End: 2019-04-16

## 2019-04-16 NOTE — PROGRESS NOTES
Last 5 Patient Entered Readings                                      Current 30 Day Average: 148/99     Recent Readings 4/15/2019 4/15/2019 4/11/2019 4/11/2019 4/10/2019    SBP (mmHg) 160 153 143 143 146    DBP (mmHg) 100 107 95 95 101    Pulse 88 89 94 94 101          Last 6 Patient Entered Readings                                          Most Recent A1c: 8.7% on 4/11/2019  (Goal: 8%)     Recent Readings 4/15/2019 4/14/2019 4/11/2019 4/10/2019 4/9/2019    Blood Glucose (mg/dL) 41 138 176 176 138        Following up on low BG reading last night of 41 mg/dL.  Patient reports she is fasting for Holy Week.  Reports she did eat some food last night.  Patient did not specify.  Reviewed rule of 15.  Patient states she did not feel bad.    Digital Medicine: Health  Follow Up    Lifestyle Modifications:    1.Dietary Modifications (Sodium intake <2,000mg/day, food labels, dining out): deferred    2.Physical Activity: deferred    3.Medication Therapy: Patient has been compliant with the medication regimen.    4.Patient has the following medication side effects/concerns: Patient took Trulicity on Monday  (Frequency/Alleviating factors/Precipitating factors, etc.)     Follow up with . Kaylee Romel Roemro completed. No further questions or concerns. Will continue to follow up to achieve health goals.

## 2019-04-17 ENCOUNTER — TELEPHONE (OUTPATIENT)
Dept: NEUROLOGY | Facility: CLINIC | Age: 74
End: 2019-04-17

## 2019-04-17 NOTE — TELEPHONE ENCOUNTER
----- Message from Aminah Dennis sent at 4/17/2019 11:56 AM CDT -----  Contact: PT  Needs Advice    Reason for call: patient is calling to schedule a follow up appt with her doctor coming to see him to get her test results.        Communication Preference:[phone]    Additional Information:

## 2019-04-24 ENCOUNTER — PATIENT MESSAGE (OUTPATIENT)
Dept: INTERNAL MEDICINE | Facility: CLINIC | Age: 74
End: 2019-04-24

## 2019-04-24 ENCOUNTER — CLINICAL SUPPORT (OUTPATIENT)
Dept: REHABILITATION | Facility: HOSPITAL | Age: 74
End: 2019-04-24
Attending: ORTHOPAEDIC SURGERY
Payer: MEDICARE

## 2019-04-24 ENCOUNTER — PATIENT MESSAGE (OUTPATIENT)
Dept: NEUROLOGY | Facility: CLINIC | Age: 74
End: 2019-04-24

## 2019-04-24 DIAGNOSIS — G89.29 CHRONIC LEFT SHOULDER PAIN: ICD-10-CM

## 2019-04-24 DIAGNOSIS — M25.512 CHRONIC LEFT SHOULDER PAIN: ICD-10-CM

## 2019-04-24 PROBLEM — M75.42 SHOULDER IMPINGEMENT SYNDROME, LEFT: Status: RESOLVED | Noted: 2019-02-21 | Resolved: 2019-04-24

## 2019-04-24 PROCEDURE — 97161 PT EVAL LOW COMPLEX 20 MIN: CPT | Mod: PO

## 2019-04-24 PROCEDURE — G8978 MOBILITY CURRENT STATUS: HCPCS | Mod: CK,PO

## 2019-04-24 PROCEDURE — G8979 MOBILITY GOAL STATUS: HCPCS | Mod: CJ,PO

## 2019-04-24 PROCEDURE — 97110 THERAPEUTIC EXERCISES: CPT | Mod: PO

## 2019-04-25 ENCOUNTER — HOSPITAL ENCOUNTER (OUTPATIENT)
Dept: RADIOLOGY | Facility: HOSPITAL | Age: 74
Discharge: HOME | End: 2019-04-25
Attending: ORTHOPAEDIC SURGERY
Payer: MEDICARE

## 2019-04-25 ENCOUNTER — OFFICE VISIT (OUTPATIENT)
Dept: ORTHOPEDICS | Facility: CLINIC | Age: 74
End: 2019-04-25
Payer: MEDICARE

## 2019-04-25 VITALS — HEIGHT: 62 IN | WEIGHT: 225.75 LBS | BODY MASS INDEX: 41.54 KG/M2

## 2019-04-25 DIAGNOSIS — M25.561 CHRONIC PAIN OF RIGHT KNEE: Primary | ICD-10-CM

## 2019-04-25 DIAGNOSIS — M25.561 CHRONIC PAIN OF RIGHT KNEE: ICD-10-CM

## 2019-04-25 DIAGNOSIS — G89.29 CHRONIC PAIN OF RIGHT KNEE: ICD-10-CM

## 2019-04-25 DIAGNOSIS — M17.11 PRIMARY OSTEOARTHRITIS OF RIGHT KNEE: ICD-10-CM

## 2019-04-25 DIAGNOSIS — G89.29 CHRONIC PAIN OF RIGHT KNEE: Primary | ICD-10-CM

## 2019-04-25 PROCEDURE — 1101F PR PT FALLS ASSESS DOC 0-1 FALLS W/OUT INJ PAST YR: ICD-10-PCS | Mod: CPTII,S$GLB,, | Performed by: ORTHOPAEDIC SURGERY

## 2019-04-25 PROCEDURE — 73560 XR KNEE ORTHO RIGHT: ICD-10-PCS | Mod: 26,59,RT, | Performed by: RADIOLOGY

## 2019-04-25 PROCEDURE — 20610 DRAIN/INJ JOINT/BURSA W/O US: CPT | Mod: RT,S$GLB,, | Performed by: ORTHOPAEDIC SURGERY

## 2019-04-25 PROCEDURE — 99213 PR OFFICE/OUTPT VISIT, EST, LEVL III, 20-29 MIN: ICD-10-PCS | Mod: 25,S$GLB,, | Performed by: ORTHOPAEDIC SURGERY

## 2019-04-25 PROCEDURE — 20610 LARGE JOINT ASPIRATION/INJECTION: R KNEE: ICD-10-PCS | Mod: RT,S$GLB,, | Performed by: ORTHOPAEDIC SURGERY

## 2019-04-25 PROCEDURE — 73560 X-RAY EXAM OF KNEE 1 OR 2: CPT | Mod: 59,TC,LT

## 2019-04-25 PROCEDURE — 73562 X-RAY EXAM OF KNEE 3: CPT | Mod: 26,RT,, | Performed by: RADIOLOGY

## 2019-04-25 PROCEDURE — 99213 OFFICE O/P EST LOW 20 MIN: CPT | Mod: 25,S$GLB,, | Performed by: ORTHOPAEDIC SURGERY

## 2019-04-25 PROCEDURE — 1101F PT FALLS ASSESS-DOCD LE1/YR: CPT | Mod: CPTII,S$GLB,, | Performed by: ORTHOPAEDIC SURGERY

## 2019-04-25 PROCEDURE — 73562 XR KNEE ORTHO RIGHT: ICD-10-PCS | Mod: 26,RT,, | Performed by: RADIOLOGY

## 2019-04-25 PROCEDURE — 73560 X-RAY EXAM OF KNEE 1 OR 2: CPT | Mod: 26,59,RT, | Performed by: RADIOLOGY

## 2019-04-25 PROCEDURE — 73562 X-RAY EXAM OF KNEE 3: CPT | Mod: TC,RT

## 2019-04-25 PROCEDURE — 99999 PR PBB SHADOW E&M-EST. PATIENT-LVL III: ICD-10-PCS | Mod: PBBFAC,,, | Performed by: ORTHOPAEDIC SURGERY

## 2019-04-25 PROCEDURE — 99999 PR PBB SHADOW E&M-EST. PATIENT-LVL III: CPT | Mod: PBBFAC,,, | Performed by: ORTHOPAEDIC SURGERY

## 2019-04-25 RX ORDER — TRIAMCINOLONE ACETONIDE 40 MG/ML
40 INJECTION, SUSPENSION INTRA-ARTICULAR; INTRAMUSCULAR
Status: DISCONTINUED | OUTPATIENT
Start: 2019-04-25 | End: 2019-04-25 | Stop reason: HOSPADM

## 2019-04-25 RX ADMIN — TRIAMCINOLONE ACETONIDE 40 MG: 40 INJECTION, SUSPENSION INTRA-ARTICULAR; INTRAMUSCULAR at 12:04

## 2019-04-25 NOTE — PROCEDURES
Large Joint Aspiration/Injection: R knee  Date/Time: 4/25/2019 12:18 PM  Performed by: John L. Ochsner Jr., MD  Authorized by: John L. Ochsner Jr., MD       Location:  Knee  Site:  R knee  Medications:  40 mg triamcinolone acetonide 40 mg/mL

## 2019-04-25 NOTE — PROGRESS NOTES
HPI:    Kaylee Romero is a 74 y.o. female who is here today for   Chief Complaint   Patient presents with    Right Knee - Pain   .   Pt w/ history of LR TKA  On 5/1/17 presents with 9 month history of L knee pain worse over last 2 weeks. No trauma.    Duration: 9 months  Intensity: moderate  Character of pain: achy  Location: She reports that the pain is predominately  medial  Patient's pain increases with activity.  Pain is increased with weightbearing and interferes with activities of daily living.      PMSFSH reviewed per clinic record       Past Medical History:   Diagnosis Date    Acid reflux     Arthritis     Cataract     CKD (chronic kidney disease) stage 3, GFR 30-59 ml/min     Diabetes mellitus, type 2     Dry mouth     Hyperlipidemia 12/2/2014    Hypertension     KAMRAN (obstructive sleep apnea)     Osteoporosis           Current Outpatient Medications:     alcohol swabs (ALCOHOL PADS) PadM, Apply 1 each topically as needed., Disp: 11 each, Rfl: 11    amLODIPine (NORVASC) 10 MG tablet, Take 1 tablet (10 mg total) by mouth once daily. For Blood Pressure., Disp: 90 tablet, Rfl: 1    aspirin (ECOTRIN) 81 MG EC tablet, Take 1 tablet (81 mg total) by mouth once daily., Disp: 1 tablet, Rfl: 0    blood sugar diagnostic Strp, 1 strip by Misc.(Non-Drug; Combo Route) route once daily., Disp: 100 strip, Rfl: 3    blood-glucose meter kit, Use as instructed, Disp: 1 each, Rfl: 0    dulaglutide (TRULICITY) 1.5 mg/0.5 mL PnIj, Inject 1.5 mg into the skin once a week., Disp: 4 Syringe, Rfl: 5    glipiZIDE (GLUCOTROL) 10 MG TR24, Take 1 tablet (10 mg total) by mouth daily with breakfast., Disp: 30 tablet, Rfl: 11    hydroCHLOROthiazide (HYDRODIURIL) 25 MG tablet, Take 1 tablet (25 mg total) by mouth once daily., Disp: 90 tablet, Rfl: 1    lancets Misc, 1 lancet by Misc.(Non-Drug; Combo Route) route once daily., Disp: 100 each, Rfl: 3    mupirocin (BACTROBAN) 2 % ointment, Apply topically 3 (three)  "times daily., Disp: 30 g, Rfl: 0    simvastatin (ZOCOR) 10 MG tablet, Take 1 tablet (10 mg total) by mouth every evening. For Cholesterol., Disp: 90 tablet, Rfl: 1    valsartan (DIOVAN) 320 MG tablet, Take 1 tablet (320 mg total) by mouth once daily., Disp: 90 tablet, Rfl: 1     Review of patient's allergies indicates:   Allergen Reactions    Keflex [cephalexin] Other (See Comments)     Turns orange    Bactrim [sulfamethoxazole-trimethoprim]      Generic version  Sulfa makes her sick        ROS  Constitutional: Negative for fever, Negative for weight loss  HENT Negative for congestion  Cardiovascular: Negative chest pain  Respiratory: Negative Shortness of breath  Heme: Negative excessive bleeding  Skin:NegativeItching, Negative breakdown  Musculoskeletal:Negative for back pain, Positive for joint pain, Negative muscle pain, Negative muscle weakness  Neurological: Negative for numbness and paresthesias   Psychiatric/Behavioral: Negative altered mental status, Negative for depression    Physical Exam:   Ht 5' 2" (1.575 m)   Wt 102.4 kg (225 lb 12 oz)   BMI 41.29 kg/m²   General appearance: This is a well-developed, Well nourished female No obvious acute distress.  Psychiatric: normal mood and affect;  pleasant and conversant; judgment and thought content normal  Gait is coordinated.   Cardiovascular: There are no swelling or varicosities present.   Respiratory: normal respiratory effort   Lymphatic: no adenopathy   Neurologic: alert and oriented to person, place, and time   Deep Tendon Reflexes are normal;  Coordination and Balance: Normal   Musculoskeletal   Neck    ROM shows normal flexion and extension and lateral rotation    Palpation: Non-tender    Stability is normal    Strength is normal    Skin is normal without masses and lesions    Sensation is intact to light touch   Back    ROM showsnormal flexion, extension and     rotation    Palpation shows no masses    Stability is normal    Strength to flexion " and extension well maintained    Core strength is diminished    Skin shows no rashes or cafe au lait spots;     Sensation is intact to light touch  Right hip   Range of motion normal    Left Hip  Range of motionnormal    Right Knee  Swelling None  TendernessMedial joint line  Range of Motion: 0-100  Motion is painfulYes  Crepitance presentNo    Right Leg  Neurologic Intact  Pulses Intact    Left Knee: Swelling None  TendernessNone  Range of Motion: 130   Motion is painful No    Left Leg   Neurologic Intact  Pulses Intact    Radiograph: Show moderately severe degenerative arthritis with subchondral sclerosis, periarticular osteophytes and narrowing of joint space.  Angle: neutral    Assessment:  Knee arthritis right       Injected joint today. 40mg triamconolone in 1cc with 4cc lidocaine. Pt tolerated procedure well. Will follow, discussed with patient that R TKA may end up being necessary in future. Recommended low impact exercises such as biking or swimming.

## 2019-04-25 NOTE — PLAN OF CARE
[facility]  Physical Therapy Initial Evaluation    Name: Kaylee Romero  Clinic Number: [id number]    Therapy Diagnosis:   Encounter Diagnosis   Name Primary?    Chronic left shoulder pain      Physician: Treasure Sorenson, *    Physician Orders: PT Eval and Treat Impingement syndrome Left, Chronic L shoulder pain  Medical Diagnosis from Referral:   M75.42 (ICD-10-CM) - Shoulder impingement syndrome, left   M25.512,G89.29 (ICD-10-CM) - Chronic left shoulder pain   Evaluation Date: 2019  Authorization Period Expiration: 3/31/20  Plan of Care Expiration: 19  Visit # / Visits authorized:     Time In: 2:00  Time Out: 3:00  Total Billable Time: 60 minutes    Precautions: Standard, Diabetes, HTN Fall and poor medical compliance    Subjective   Date of onset: 18  History of current condition - Kaylee reports: her L shoulder has been hurting her after she lost her footing descending stairs and landed on the stairs railings in 2018. Pt reports she was getting better in PT but d/t vacation and feeling ill she was unable to attend therapy. Pt denies performing home exercise program while away from therapy but endorses that she is getting her HTN and blood glucose better under control and has adherent with her pharmacological and nutritional regiment     Hooking a bra  Holding more than a gallon of milk    Past Medical History:   Diagnosis Date    Acid reflux     Arthritis     Cataract     CKD (chronic kidney disease) stage 3, GFR 30-59 ml/min     Diabetes mellitus, type 2     Dry mouth     Hyperlipidemia 2014    Hypertension     KAMRAN (obstructive sleep apnea)     Osteoporosis      Kaylee Romero  has a past surgical history that includes  section; ankle surgery (l); Appendectomy; Knee arthroscopy w/ debridement; Dilation and curettage of uterus; and Knee surgery (Left, 2017).    Kaylee has a current medication list which includes  the following prescription(s): alcohol swabs, amlodipine, aspirin, blood sugar diagnostic, blood-glucose meter, dulaglutide, glipizide, hydrochlorothiazide, lancets, mupirocin, simvastatin, and valsartan.    Review of patient's allergies indicates:   Allergen Reactions    Keflex [cephalexin] Other (See Comments)     Turns orange    Bactrim [sulfamethoxazole-trimethoprim]      Generic version  Sulfa makes her sick      Imaging, X-ray: 10/4/18   COMPARISON: 01/03/2018  FINDINGS:  Mild degenerative changes at the acromioclavicular joint can be seen.  No soft tissue calcification is identified.  Bony structures are intact.     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  Prior Level of Function: Pt independent with all ADLs, leading international educational trips and walking without difficulty  Current Level of Function: Pt relies on  to help with dressing, pt is unable to perform overhead/reaching tasks and pt tolerates minimal walking (prefers to scoot around house in wheeled chairs; has had falls doing this)     Pain:  Current 3/10, worst 9/10, best 2/10   Location: left shoulder   Description: Aching, Dull, Burning, Throbbing, Sharp, Electric, Shooting and Variable  Aggravating Factors: Touching, Night Time, Morning, Extension, Flexing, Lifting and Getting out of bed/chair  Easing Factors: relaxation, pain medication, ice, hot bath and rest     Pts goals: 2 miles a day, reduced pain in the shoulder, increase ROM      Objective     Objective measures taken today to evaluate progress     BP taken pre- evaluation: 144/92 L UE while sitting*  *Pt reports she forgot to take her BP this morning      R shoulder AROM/PROM WNL and pain free  AROM Left Comment Normal   Shoulder  "Flexion: 160 degrees  P! 180 deg   Shoulder Extension: 60 degrees   60 deg   Shoulder Abduction: 160 degrees  pain at end range 180 deg   Shoulder ER: @90 50 degrees P! 90 deg   Shoulder IR: @90 60 degrees P! 90 deg   PROM Left Comment Normal   Shoulder Flexion:  180degrees   180 deg   Shoulder Abduction: 180 degrees   180 deg   Shoulder ER, 90°ABD: 90 degrees  P!  90 deg   Shoulder IR, 90° ABD: 55  degrees  P! 90 deg   *denotes pain     MMT    Right Left Comment   Shoulder flexion: 4+/5 3+ /5  P! On L   Shoulder Abduction: 4+/5 3+/5  P! On L   Shoulder ER: 4+/5 3/5     Shoulder IR: 4+/5 3+/5  "effort but no pain"      Special Tests     - Neers: left positive  - Arzate-Henry: left positive  - Lift-off: left positive  - Drop Arm: left negative  - Full/Empty Can: left positive  - Biceps Load I/II: left negative  - Scour left negative     CMS Impairment/Limitation/Restriction for FOTO Shoulder Survey     Therapist reviewed FOTO scores for Kaylee Romero on 1/21/2019.   FOTO documents entered into [employer] - see Media section.     Limitation Score: 43%  Category: Mobility     Current : CK = at least 40% but < 60% impaired, limited or restricted  Goal: CJ = at least 20% but < 40% impaired, limited or restricted           TREATMENT   Treatment Time In: 3:40  Treatment Time Out: 4:00  Total Treatment time separate from Evaluation: 20 minutes     Kaylee received therapeutic exercises to develop strength, endurance, ROM, flexibility and posture for 15 minutes including:     Dowel OH flexion 2x30  Isometric brueggars 3 sec holds 2x10 (can use pillow case or dowel)  Scap retraction/ depression 3 sec holds 10x10     Kaylee received the following manual therapy techniques: Joint mobilizations, Manual traction, Myofacial release and Soft tissue Mobilization were applied to the: L shoulder for 5 minutes, including:     STM to pec minor, pec major, deltoid  Post GH glide Gr II-III  Inf GH glide Gr III-IV     Kaylee received cold " pack for inflammation control/pain modulation to the L shoulder minutes to 10.     Home Exercises and Patient Education Provided     Education provided re: posture, activity modification, importance of regular physical activity     Written Home Exercises Provided: All exercises performed during today's treatment were printed and given to pt.  Exercises were reviewed and Kaylee was able to demonstrate them prior to the end of the session.   Pt received a written copy of exercises to perform at home. Kaylee demonstrated good  understanding of the education provided.      Assessment   Kaylee is a 74 y.o. female referred to outpatient Physical Therapy with a medical diagnosis of L shoulder pain. Pt presents with decreased strength, decreased ROM, decreased flexibility, faulty posture, and increased pain. Due to impairments, pt is unable to perform ADLs at OF, participate in regular physical activity and is limited in ability to reach for objects.      Pt prognosis is Good.   Pt will benefit from skilled outpatient Physical Therapy to address the deficits stated above and in the chart below, provide pt/family education, and to maximize pt's level of independence.      Plan of care discussed with patient: Yes  Pt's spiritual, cultural and educational needs considered and patient is agreeable to the plan of care and goals as stated below:      Anticipated Barriers for therapy: HEP compliance, uncontrolled HTN  Medical Necessity is demonstrated by the following  History  Co-morbidities and personal factors that may impact the plan of care Co-morbidities:   anxiety, CKD stage 3, diabetes, high BMI, history of CVA, HTN, level of undertstanding of current condition and poor medication/medical compliance     Personal Factors:   lifestyle  attitudes       moderate   Examination  Body Structures and Functions, activity limitations and participation restrictions that may impact the plan of care Body Regions:   upper  extremities     Body Systems:    ROM  strength  balance  gait  transfers     Participation Restrictions:   International educational excursions, recreational physical activity     Activity limitations:   Learning and applying knowledge  no deficits     General Tasks and Commands  no deficits     Communication  no deficits     Mobility  lifting and carrying objects  walking  fine hand use (grasping/picking up)  moving around using equipment (WC)  using transportation (bus, train, plane, car)  driving (bike, car, motorcycle)     Self care  washing oneself (bathing, drying, washing hands)  caring for body parts (brushing teeth, shaving, grooming)  toileting  dressing  looking after one's health     Domestic Life  shopping  cooking  doing house work (cleaning house, washing dishes, laundry)     Interactions/Relationships  no deficits     Life Areas  no deficits     Community and Social Life  no deficits             moderate   Clinical Presentation evolving clinical presentation with changing clinical characteristics moderate   Decision Making/ Complexity Score: moderate      Goals:  Short Term Goals: 3 weeks   1. Pt will be independent with HEP, demonstrate good form and report participating in walking program for at least 15 minutes/day 5 days/week  2. Pt will demonstrate pain free PROM of the L shoulder of at least 140 deg flexion, 110 deg abduction and 50 degrees of ER/IR @90 deg abduction to demonstrate improved functional mobility  3. Pt will report good compliance with prescribed medications, will report drinking at least 60 ounces of water/day and will report one healthy dietary change in order to improve tolerance to physical activity and reduce the risk of chest pain on exertion     Long Term Goals: 10 weeks   1. Pt will demonstrate full/symmetrical AROM to bilat shoulders reporting no pain at end range to demonstrate improved functional mobility  2. Pt will be able to independently don/doff shirt/ bra strap  without reports of pain to maximize pt's independence   3. Pt will be able to lift 5# overhead 5x and be able to carry 15# 25ft with L UE reporting less than 2/10 pain in order to demonstrate improved shoulder strength and function.    Plan   Plan of care Certification: 4/24/2019 to 7/1/19.    Outpatient Physical Therapy 2 times weekly for 10 weeks to include the following interventions: Cervical/Lumbar Traction, Electrical Stimulation TENS, Gait Training, Manual Therapy, Moist Heat/ Ice, Neuromuscular Re-ed, Patient Education, Self Care, Therapeutic Activites, Therapeutic Exercise and Ultrasound.     Leonard Germain, PT

## 2019-04-28 ENCOUNTER — PATIENT MESSAGE (OUTPATIENT)
Dept: INTERNAL MEDICINE | Facility: CLINIC | Age: 74
End: 2019-04-28

## 2019-04-28 DIAGNOSIS — E11.8 TYPE 2 DIABETES MELLITUS WITH COMPLICATION, WITHOUT LONG-TERM CURRENT USE OF INSULIN: ICD-10-CM

## 2019-04-28 RX ORDER — GLIPIZIDE 10 MG/1
20 TABLET, FILM COATED, EXTENDED RELEASE ORAL
Qty: 60 TABLET | Refills: 2 | Status: ON HOLD | OUTPATIENT
Start: 2019-04-28 | End: 2019-08-21 | Stop reason: HOSPADM

## 2019-04-29 ENCOUNTER — TELEPHONE (OUTPATIENT)
Dept: INTERNAL MEDICINE | Facility: CLINIC | Age: 74
End: 2019-04-29

## 2019-04-29 ENCOUNTER — CLINICAL SUPPORT (OUTPATIENT)
Dept: REHABILITATION | Facility: HOSPITAL | Age: 74
End: 2019-04-29
Attending: ORTHOPAEDIC SURGERY
Payer: MEDICARE

## 2019-04-29 DIAGNOSIS — M25.512 CHRONIC LEFT SHOULDER PAIN: ICD-10-CM

## 2019-04-29 DIAGNOSIS — G89.29 CHRONIC LEFT SHOULDER PAIN: ICD-10-CM

## 2019-04-29 PROCEDURE — 97110 THERAPEUTIC EXERCISES: CPT | Mod: PO

## 2019-04-29 NOTE — TELEPHONE ENCOUNTER
----- Message from Joy Phillips sent at 4/29/2019  2:36 PM CDT -----  Contact: [facility]/[phone]  Rep at member services called in regards to getting a medical necessity form/orders/ updated clinical notes for the diabetic supplies. They would need it faxed to [phone].      Please advise
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
## 2019-05-02 ENCOUNTER — CLINICAL SUPPORT (OUTPATIENT)
Dept: REHABILITATION | Facility: HOSPITAL | Age: 74
End: 2019-05-02
Payer: MEDICARE

## 2019-05-02 DIAGNOSIS — G89.29 CHRONIC LEFT SHOULDER PAIN: ICD-10-CM

## 2019-05-02 DIAGNOSIS — M25.512 CHRONIC LEFT SHOULDER PAIN: ICD-10-CM

## 2019-05-02 PROCEDURE — 97110 THERAPEUTIC EXERCISES: CPT | Mod: PO

## 2019-05-02 NOTE — PROGRESS NOTES
"  Physical Therapy Daily Treatment Note     Name: Kaylee Urena Wheatcroft  Clinic Number: [id number]    Therapy Diagnosis:   Encounter Diagnosis   Name Primary?    Chronic left shoulder pain      Physician: Treasure Sorenson, *    Visit Date: 5/2/2019    Physician Orders: PT Eval and Treat Impingement syndrome Left, Chronic L shoulder pain  Medical Diagnosis from Referral:   M75.42 (ICD-10-CM) - Shoulder impingement syndrome, left   M25.512,G89.29 (ICD-10-CM) - Chronic left shoulder pain   Evaluation Date: 4/24/2019  Authorization Period Expiration: 06/24/2019  Plan of Care Expiration: 7/24/19  Visit # / Visits authorized: 3/12    Time In: 1:00 PM  Time Out: 1:45 PM  Total Billable Time: 30 minutes    Precautions: Standard, Diabetes and HTN    Subjective     Pt reports: no pain upon arrival. Patient reports her shoulder gets "uncomfortable" with some of the exercises.   She was compliant with home exercise program.  Response to previous treatment/Functional change: Patient reports her shoulder does not wake her up during the night like it used to.     Pain: 0/10  Location: left shoulder      Objective     BP taken in L UE in sitting   BP upon arrival: 186/96  BP after MHP: 164/92  BP after exercises: 172/108  BP after seated rest break: 160/105    Kaylee received hot pack for 10 minutes to R shoulder for tissue extensibility and pain modulation.    Kaylee received therapeutic exercises to develop strength, endurance, ROM, flexibility, posture and core stabilization for 30 minutes including:    Supine    Dowel OH flexion x 20   Dowel protraction 2x15    Standing:    Shoulder OTB I's 2 x 10   Shoulder OTB Rows 2 x 10   Shoulder ER OTB 2 x 10   Shoulder IR OTB 2 x 10   Serratus wall slides with ball 2 x 15    Home Exercises Provided and Patient Education Provided     Education provided:   - posture, activity modification, roll of PT  -Progress toward goals     Written Home Exercises Provided: Patient instructed to cont prior " HEP.  Exercises were reviewed and Kaylee was able to demonstrate them prior to the end of the session.  Kaylee demonstrated good  understanding of the education provided.     See EMR under (none provided today's visit) for exercises provided prior visit.    Assessment   Kaylee with good tolerance to exercises performed today with no reports of increased pain. Limited exercises performed today d/t patient's elevated BP. Patient inquired about how to manage her BP and was instructed to contact her PCP and cardiologist. Patient also inquired about nutrition and was referred to a nutritionist.     Pt prognosis is Good.     Pt will continue to benefit from skilled outpatient physical therapy to address the deficits listed in the problem list box on initial evaluation, provide pt/family education and to maximize pt's level of independence in the home and community environment.     Pt's spiritual, cultural and educational needs considered and pt agreeable to plan of care and goals.     Anticipated barriers to physical therapy: HTN, uncontrolled blood sugar, HEP compliance    Goals: Goals:  Short Term Goals: 3 weeks   1. Pt will be independent with HEP, demonstrate good form and report participating in walking program for at least 15 minutes/day 5 days/week  2. Pt will demonstrate pain free PROM of the L shoulder of at least 140 deg flexion, 110 deg abduction and 50 degrees of ER/IR @90 deg abduction to demonstrate improved functional mobility  3. Pt will report good compliance with prescribed medications, will report drinking at least 60 ounces of water/day and will report one healthy dietary change in order to improve tolerance to physical activity and reduce the risk of chest pain on exertion     Long Term Goals: 10 weeks   1. Pt will demonstrate full/symmetrical AROM to bilat shoulders reporting no pain at end range to demonstrate improved functional mobility  2. Pt will be able to independently don/doff shirt/ bra strap  without reports of pain to maximize pt's independence   3. Pt will be able to lift 5# overhead 5x and be able to carry 15# 25ft with L UE reporting less than 2/10 pain in order to demonstrate improved shoulder strength and function.        Plan     Continue with established Plan of Care towards PT goals.

## 2019-05-03 ENCOUNTER — PATIENT OUTREACH (OUTPATIENT)
Dept: OTHER | Facility: OTHER | Age: 74
End: 2019-05-03

## 2019-05-03 DIAGNOSIS — I50.30 HYPERTENSIVE HEART DISEASE WITH DIASTOLIC HEART FAILURE: Primary | ICD-10-CM

## 2019-05-03 DIAGNOSIS — I11.0 HYPERTENSIVE HEART DISEASE WITH DIASTOLIC HEART FAILURE: Primary | ICD-10-CM

## 2019-05-03 RX ORDER — CHLORTHALIDONE 25 MG/1
25 TABLET ORAL DAILY
Qty: 30 TABLET | Refills: 11 | Status: ON HOLD | OUTPATIENT
Start: 2019-05-03 | End: 2019-08-21 | Stop reason: HOSPADM

## 2019-05-03 NOTE — PROGRESS NOTES
"Last 5 Patient Entered Readings                                      Current 30 Day Average: 148/94     Recent Readings 5/3/2019 4/30/2019 4/29/2019 4/28/2019 4/26/2019    SBP (mmHg) 168 144 148 176 140    DBP (mmHg) 102 91 96 91 90    Pulse 99 96 93 86 104        5/3:Spoke to patient.  She is frustrated that readings remain high. States that I need to "do something to get them down."  Changing HCTZ to Chlorthalidone.  Denies symptoms of elevated blood pressure: severe HA, change in vision or speech, numbness/tingling/weakness on one side of body, CP, SOB.       Last 6 Patient Entered Readings                                          Most Recent A1c: 8.7% on 4/11/2019  (Goal: 8%)     Recent Readings 4/30/2019 4/29/2019 4/28/2019 4/26/2019 4/22/2019    Blood Glucose (mg/dL) 131 179 217 168 154          5/3: Spoke to patient.  PCP increased glipizide to 20 mg on 4/28/2019.    "

## 2019-05-06 ENCOUNTER — OFFICE VISIT (OUTPATIENT)
Dept: NEUROLOGY | Facility: CLINIC | Age: 74
End: 2019-05-06
Payer: MEDICARE

## 2019-05-06 VITALS
DIASTOLIC BLOOD PRESSURE: 94 MMHG | BODY MASS INDEX: 41.29 KG/M2 | SYSTOLIC BLOOD PRESSURE: 155 MMHG | HEART RATE: 90 BPM | HEIGHT: 62 IN

## 2019-05-06 DIAGNOSIS — Z86.73 HISTORY OF TIA (TRANSIENT ISCHEMIC ATTACK): Primary | ICD-10-CM

## 2019-05-06 PROCEDURE — 99213 OFFICE O/P EST LOW 20 MIN: CPT | Mod: S$GLB,,, | Performed by: PSYCHIATRY & NEUROLOGY

## 2019-05-06 PROCEDURE — 3077F PR MOST RECENT SYSTOLIC BLOOD PRESSURE >= 140 MM HG: ICD-10-PCS | Mod: CPTII,S$GLB,, | Performed by: PSYCHIATRY & NEUROLOGY

## 2019-05-06 PROCEDURE — 99999 PR PBB SHADOW E&M-EST. PATIENT-LVL II: CPT | Mod: PBBFAC,,, | Performed by: PSYCHIATRY & NEUROLOGY

## 2019-05-06 PROCEDURE — 3080F DIAST BP >= 90 MM HG: CPT | Mod: CPTII,S$GLB,, | Performed by: PSYCHIATRY & NEUROLOGY

## 2019-05-06 PROCEDURE — 99499 UNLISTED E&M SERVICE: CPT | Mod: S$GLB,,, | Performed by: PSYCHIATRY & NEUROLOGY

## 2019-05-06 PROCEDURE — 3077F SYST BP >= 140 MM HG: CPT | Mod: CPTII,S$GLB,, | Performed by: PSYCHIATRY & NEUROLOGY

## 2019-05-06 PROCEDURE — 99499 RISK ADDL DX/OHS AUDIT: ICD-10-PCS | Mod: S$GLB,,, | Performed by: PSYCHIATRY & NEUROLOGY

## 2019-05-06 PROCEDURE — 1101F PT FALLS ASSESS-DOCD LE1/YR: CPT | Mod: CPTII,S$GLB,, | Performed by: PSYCHIATRY & NEUROLOGY

## 2019-05-06 PROCEDURE — 99999 PR PBB SHADOW E&M-EST. PATIENT-LVL II: ICD-10-PCS | Mod: PBBFAC,,, | Performed by: PSYCHIATRY & NEUROLOGY

## 2019-05-06 PROCEDURE — 1101F PR PT FALLS ASSESS DOC 0-1 FALLS W/OUT INJ PAST YR: ICD-10-PCS | Mod: CPTII,S$GLB,, | Performed by: PSYCHIATRY & NEUROLOGY

## 2019-05-06 PROCEDURE — 99213 PR OFFICE/OUTPT VISIT, EST, LEVL III, 20-29 MIN: ICD-10-PCS | Mod: S$GLB,,, | Performed by: PSYCHIATRY & NEUROLOGY

## 2019-05-06 PROCEDURE — 3080F PR MOST RECENT DIASTOLIC BLOOD PRESSURE >= 90 MM HG: ICD-10-PCS | Mod: CPTII,S$GLB,, | Performed by: PSYCHIATRY & NEUROLOGY

## 2019-05-06 NOTE — PROGRESS NOTES
Vascular Neurology  Clinic Note    Reason For Visit (Chief Complaint): prior TIA eval 12/2018 (last visit)    Interval Hx: here to review images from December. No new complaints at this time.    HPI: 74 y.o. right handed female with recent episode of speech difficulty.  Event occurred in November, while she was on the telephone, suddenly lost the ability to articulate or think to put together a sentence and make her thoughts coherent. Shortly thereafter she noticed that she had a hard time typing d/t the difficulty with the letters. Event lasted 30 minutes.  The aforementioned symptoms have never happened prior to the event. There are no identified triggers or modifying factors. There have been no recurrent events. There are no other associated symptoms.  Patient's  is present and informed me that she had a blank stare on her face during this episodes.Having fluttering in the heart, pain, shortness of breath, fluttering and palpitation. Has an injured shoulder on the left. Has uncontrolled blood pressure. Blood pressure today is markedly elevated but she did not take her medications this AM. However daily BP checks at home have her at 150-170's despite 3 drug regimen (CCB, ACEi, HCTZ) which includes a diuretic but amlodipine is 2.5 mg and HCTZ is 12.5    Brain Imaging:  MRI 12/2018 - normal MRI w/o e/o prior infarction contributing to prior transient symptoms    Vessel Imaging:  CTA 12/2018 - no significant extracranial atherosclerotic disease or intracranial atherosclerotic disease    Cardiac Evaluation:  TTE 12/18  · The patient reported no symptoms during the stress test.  · Arrhythmias during stress: rare PACs.  · Overall, the patient's exercise capacity was severely impaired.  · The EKG portion of this study is negative for myocardial ischemia.  · Normal left ventricular systolic function. The estimated ejection fraction is 65%  · Concentric left ventricular remodeling.  · No wall motion  abnormalities.  · Indeterminate left ventricular diastolic function.  · Normal right ventricular systolic function.  · Normal atrial size.  · The stress echo portion of this study is negative for myocardial ischemia.  Other:   None  Relevant Labwork:  Recent Labs   Lab 04/11/19  1315   HEMOGLOBIN A1C 8.7 H   LDL CHOLESTEROL 83.2   HDL 49   TRIGLYCERIDES 64   CHOLESTEROL 145     I reviewed the above labwork.    Review of Systems  Msk: negative for muscle pain  Skin: negative for pruritis  Neuro: negative for headache  All others negative    Past Medical History  Past Medical History:   Diagnosis Date    Acid reflux     Arthritis     Cataract     CKD (chronic kidney disease) stage 3, GFR 30-59 ml/min     Diabetes mellitus, type 2     Dry mouth     Hyperlipidemia 12/2/2014    Hypertension     KAMRAN (obstructive sleep apnea)     Osteoporosis      Family History  No relevant history   Social History  Never Smoker     Medication List with Changes/Refills   Current Medications    ALCOHOL SWABS (ALCOHOL PADS) PADM    Apply 1 each topically as needed.    AMLODIPINE (NORVASC) 10 MG TABLET    Take 1 tablet (10 mg total) by mouth once daily. For Blood Pressure.    ASPIRIN (ECOTRIN) 81 MG EC TABLET    Take 1 tablet (81 mg total) by mouth once daily.    BLOOD SUGAR DIAGNOSTIC STRP    1 strip by Misc.(Non-Drug; Combo Route) route once daily.    BLOOD-GLUCOSE METER KIT    Use as instructed    CHLORTHALIDONE (HYGROTEN) 25 MG TAB    Take 1 tablet (25 mg total) by mouth once daily.    DULAGLUTIDE (TRULICITY) 1.5 MG/0.5 ML PNIJ    Inject 1.5 mg into the skin once a week.    GLIPIZIDE (GLUCOTROL) 10 MG TR24    Take 2 tablets (20 mg total) by mouth daily with breakfast.    LANCETS MISC    1 lancet by Misc.(Non-Drug; Combo Route) route once daily.    MUPIROCIN (BACTROBAN) 2 % OINTMENT    Apply topically 3 (three) times daily.    SIMVASTATIN (ZOCOR) 10 MG TABLET    Take 1 tablet (10 mg total) by mouth every evening. For  "Cholesterol.    VALSARTAN (DIOVAN) 320 MG TABLET    Take 1 tablet (320 mg total) by mouth once daily.       EXAM  Vital Signs:Blood pressure (!) 155/94, pulse 90, height 5' 2" (1.575 m).  General: well appearing without discomfort   Mental Status:alert, oriented to person - place - age - month   Language: able to name, repeat, comprehend commands   Cranial Nerves: EOMI, PERRL, no facial asymmetry, tongue to midline, palate midline  Motor: 5/5 power in all extremities, normal tone  Sensory: intact light touch bilaterally, intact proprioception bilaterally  Coordination: no ataxia on finger-to-nose or heel-to-shin testing; no truncal ataxia  Gait & Stance: normal    Stroke Scales      ___________________  ASSESSMENT & PLAN    Problem List Items Addressed This Visit        1 - High    History of TIA (transient ischemic attack) - Primary    Current Assessment & Plan     Etiology: Undetermined - Cryptogenic  MIKHAIL Absent -- CE Absent --  Absent -- Other Absent   · Diagnostic Orders: none  · Secondary stroke prevention: Continue aspirin  · Continue current statin therapy   , goal LDL < 70  · Blood pressure goal < 130/80 mmHg; continues to address with modifications per medicine  · Stroke Risk Factors Addressed: HTN, HLD, DM, obesity  · Stroke education administered               MD Ramila  Vascular Neurology  Office [phone]  Fax [phone]    "

## 2019-05-07 ENCOUNTER — CLINICAL SUPPORT (OUTPATIENT)
Dept: REHABILITATION | Facility: HOSPITAL | Age: 74
End: 2019-05-07
Payer: MEDICARE

## 2019-05-07 DIAGNOSIS — M25.512 CHRONIC LEFT SHOULDER PAIN: ICD-10-CM

## 2019-05-07 DIAGNOSIS — G89.29 CHRONIC LEFT SHOULDER PAIN: ICD-10-CM

## 2019-05-07 PROCEDURE — 97110 THERAPEUTIC EXERCISES: CPT | Mod: PO

## 2019-05-07 NOTE — ASSESSMENT & PLAN NOTE
Etiology: Undetermined - Cryptogenic  MIKHAIL Absent -- CE Absent --  Absent -- Other Absent   · Diagnostic Orders: none  · Secondary stroke prevention: Continue aspirin  · Continue current statin therapy   , goal LDL < 70  · Blood pressure goal < 130/80 mmHg; continues to address with modifications per medicine  · Stroke Risk Factors Addressed: HTN, HLD, DM, obesity  · Stroke education administered

## 2019-05-07 NOTE — PROGRESS NOTES
Physical Therapy Daily Treatment Note     Name: Kaylee Urena [place]  Clinic Number: [id number]    Therapy Diagnosis:   Encounter Diagnosis   Name Primary?    Chronic left shoulder pain      Physician: Treasure Sorenson, *    Visit Date: 5/7/2019    Physician Orders: PT Eval and Treat Impingement syndrome Left, Chronic L shoulder pain  Medical Diagnosis from Referral:   M75.42 (ICD-10-CM) - Shoulder impingement syndrome, left   M25.512,G89.29 (ICD-10-CM) - Chronic left shoulder pain   Evaluation Date: 4/24/2019  Authorization Period Expiration: 06/24/2019  Plan of Care Expiration: 7/24/19  Visit # / Visits authorized: 4/12     Time In: 1:15 PM  (pt late arrival)  Time Out: 2:10 PM  Total Billable Time: 30 minutes (TE-2)    Precautions: Standard, Diabetes and HTN    Subjective     Pt reports: she did deep breathing exercises on her way here to help her relax so that her blood pressure remains low. Pt stated she started taking a new blood pressure medication on Sunday. Pt stated she does not have any shoulder pain currently.   She was compliant with home exercise program.  Response to previous treatment/Functional change: Patient reports her shoulder does not wake her up during the night like it used to.     Pain: 0/10  Location: left shoulder      Objective     BP taken in L UE in sitting   BP upon arrival: 148/95  BP after mat exercises: 145/95  BP after standing exercises: 152/94      Kaylee received therapeutic exercises to develop strength, endurance, ROM, flexibility, posture and core stabilization for 45 minutes including:    Supine    Dowel OH flexion x 20   Dowel protraction 2x15    Standing:    Shoulder OTB I's 2 x 10   Shoulder OTB Rows 2 x 10   Shoulder ER OTB 2 x 10   Shoulder IR OTB 2 x 10   Serratus wall slides with ball 2 x 15  Bruggers OTB: 3x10  Wall slides abduction: 3x10       Pt received CP to R shoulder for 10'.      Home Exercises Provided and Patient Education Provided     Education provided:    - posture, activity modification, roll of PT  -Progress toward goals     Written Home Exercises Provided: Patient instructed to cont prior HEP.  Exercises were reviewed and Kaylee was able to demonstrate them prior to the end of the session.  Kaylee demonstrated good  understanding of the education provided.     See EMR under (none provided today's visit) for exercises provided prior visit.    Assessment     Pt demonstrated a good tolerance to session today, requiring cues for proper form throughout session. Pt continues to demonstrate rotator cuff weakness and fatigues quickly with exercises.   Pt prognosis is Good.     Pt will continue to benefit from skilled outpatient physical therapy to address the deficits listed in the problem list box on initial evaluation, provide pt/family education and to maximize pt's level of independence in the home and community environment.   Pt's spiritual, cultural and educational needs considered and pt agreeable to plan of care and goals.     Anticipated barriers to physical therapy: HTN, uncontrolled blood sugar, HEP compliance    Goals: Goals:  Short Term Goals: 3 weeks   1. Pt will be independent with HEP, demonstrate good form and report participating in walking program for at least 15 minutes/day 5 days/week  2. Pt will demonstrate pain free PROM of the L shoulder of at least 140 deg flexion, 110 deg abduction and 50 degrees of ER/IR @90 deg abduction to demonstrate improved functional mobility  3. Pt will report good compliance with prescribed medications, will report drinking at least 60 ounces of water/day and will report one healthy dietary change in order to improve tolerance to physical activity and reduce the risk of chest pain on exertion     Long Term Goals: 10 weeks   1. Pt will demonstrate full/symmetrical AROM to bilat shoulders reporting no pain at end range to demonstrate improved functional mobility  2. Pt will be able to independently don/doff shirt/ bra  strap without reports of pain to maximize pt's independence   3. Pt will be able to lift 5# overhead 5x and be able to carry 15# 25ft with L UE reporting less than 2/10 pain in order to demonstrate improved shoulder strength and function.        Plan     Continue with established Plan of Care towards PT goals.   Angi Smyth, PTA

## 2019-05-09 ENCOUNTER — CLINICAL SUPPORT (OUTPATIENT)
Dept: REHABILITATION | Facility: HOSPITAL | Age: 74
End: 2019-05-09
Payer: MEDICARE

## 2019-05-09 DIAGNOSIS — G89.29 CHRONIC LEFT SHOULDER PAIN: ICD-10-CM

## 2019-05-09 DIAGNOSIS — M25.512 CHRONIC LEFT SHOULDER PAIN: ICD-10-CM

## 2019-05-09 PROCEDURE — 97110 THERAPEUTIC EXERCISES: CPT | Mod: PO

## 2019-05-09 NOTE — PROGRESS NOTES
Physical Therapy Daily Treatment Note     Name: Kaylee Urena [place]  Clinic Number: [id number]    Therapy Diagnosis:   Encounter Diagnosis   Name Primary?    Chronic left shoulder pain      Physician: Treasure Sorenson, *    Visit Date: 5/9/2019    Physician Orders: PT Eval and Treat Impingement syndrome Left, Chronic L shoulder pain  Medical Diagnosis from Referral:   M75.42 (ICD-10-CM) - Shoulder impingement syndrome, left   M25.512,G89.29 (ICD-10-CM) - Chronic left shoulder pain   Evaluation Date: 4/24/2019  Authorization Period Expiration: 06/24/2019  Plan of Care Expiration: 7/24/19  Visit # / Visits authorized: 5/12     Time In: 1:25 PM  (pt with early arrival)  Time Out: 2:00 PM  Total Billable Time: 35 minutes (TE-2)    Precautions: Standard, Diabetes and HTN    Subjective     Pt reports: she has been doing much better over the last few weeks. Pt reports continued pain if laying the wrong way but reaching has been easier. HEP has been going well  She was compliant with home exercise program.  Response to previous treatment/Functional change: reaching is easier  Pain: 0/10  Location: left shoulder      Objective     BP taken in L UE in sitting   BP upon arrival: 145/90    Kaylee received therapeutic exercises to develop strength, endurance, ROM, flexibility, posture and core stabilization for 35 minutes including:    Bolded exercises performed during today's treatment    UE bike 3 min forward 3 min back lvl 1.0    Supine    Dowel OH flexion x 20   Protraction unilateral 7# DB 2x15 each    Standing:    Shoulder OTB I's 2 x 10   Shoulder OTB Rows 2 x 10   Shoulder ER OTB 2 x 10   Shoulder IR OTB 2 x 10   Serratus wall slides with foam roller 2 x 15  Bruggers OTB: 3x10  Wall slides abduction: 3x10       Pt received CP to R shoulder for 10'.      Home Exercises Provided and Patient Education Provided     Education provided:   - posture, activity modification, roll of PT  -Progress toward goals     Written Home  Exercises Provided: yes.  Exercises were reviewed and Kaylee was able to demonstrate them prior to the end of the session.  Kaylee demonstrated good  understanding of the education provided.     See EMR under Patient Instructions for exercises provided 5/9/19.    Assessment     Pt presents with good tolerance to progressed exercise. Pt's BP is trending downwards, which pt reports she feels better overall. Pt feels ready to start transition to independent home program. Will continue to progress periscapular strengthening to tolerance.    Pt prognosis is Good.     Pt will continue to benefit from skilled outpatient physical therapy to address the deficits listed in the problem list box on initial evaluation, provide pt/family education and to maximize pt's level of independence in the home and community environment.   Pt's spiritual, cultural and educational needs considered and pt agreeable to plan of care and goals.     Anticipated barriers to physical therapy: HTN, uncontrolled blood sugar, HEP compliance    Goals: Goals:  Short Term Goals: 3 weeks   1. Pt will be independent with HEP, demonstrate good form and report participating in walking program for at least 15 minutes/day 5 days/week  2. Pt will demonstrate pain free PROM of the L shoulder of at least 140 deg flexion, 110 deg abduction and 50 degrees of ER/IR @90 deg abduction to demonstrate improved functional mobility  3. Pt will report good compliance with prescribed medications, will report drinking at least 60 ounces of water/day and will report one healthy dietary change in order to improve tolerance to physical activity and reduce the risk of chest pain on exertion     Long Term Goals: 10 weeks   1. Pt will demonstrate full/symmetrical AROM to bilat shoulders reporting no pain at end range to demonstrate improved functional mobility  2. Pt will be able to independently don/doff shirt/ bra strap without reports of pain to maximize pt's  independence   3. Pt will be able to lift 5# overhead 5x and be able to carry 15# 25ft with L UE reporting less than 2/10 pain in order to demonstrate improved shoulder strength and function.    Plan     Continue with established Plan of Care towards PT goals.   Leonard Germain, PT

## 2019-05-10 ENCOUNTER — PATIENT OUTREACH (OUTPATIENT)
Dept: OTHER | Facility: OTHER | Age: 74
End: 2019-05-10

## 2019-05-10 NOTE — PROGRESS NOTES
Last 5 Patient Entered Readings                                      Current 30 Day Average: 151/92     Recent Readings 5/10/2019 5/8/2019 5/4/2019 5/3/2019 4/30/2019    SBP (mmHg) 124 163 166 168 144    DBP (mmHg) 80 100 100 102 91    Pulse 99 89 88 99 96          5/10: Spoke to patient re: improved readings with chlorthalidone started on 5/3.  Pt states feeling good and she is encouraged that this will work for her.    Hypertension Medications             amLODIPine (NORVASC) 10 MG tablet Take 1 tablet (10 mg total) by mouth once daily. For Blood Pressure.    chlorthalidone (HYGROTEN) 25 MG Tab Take 1 tablet (25 mg total) by mouth once daily.    valsartan (DIOVAN) 320 MG tablet Take 1 tablet (320 mg total) by mouth once daily.            Last 6 Patient Entered Readings                                          Most Recent A1c: 8.7% on 4/11/2019  (Goal: 8%)     Recent Readings 5/10/2019 5/8/2019 5/4/2019 4/30/2019 4/29/2019    Blood Glucose (mg/dL) 183 106 154 131 179        5/10: Spoke to patient: readings slightly improved with glipizide. She is encouraged.  Continue current regimen.    Diabetes Medications             dulaglutide (TRULICITY) 1.5 mg/0.5 mL PnIj Inject 1.5 mg into the skin once a week.        Claudia Shields PA-C 4/2/2019  3:38 PM  Takes on Sunday             glipiZIDE (GLUCOTROL) 10 MG TR24 Take 2 tablets (20 mg total) by mouth daily with breakfast.

## 2019-05-15 ENCOUNTER — PATIENT OUTREACH (OUTPATIENT)
Dept: OTHER | Facility: OTHER | Age: 74
End: 2019-05-15

## 2019-05-15 NOTE — PROGRESS NOTES
Last 5 Patient Entered Readings                                      Current 30 Day Average: 152/91     Recent Readings 5/14/2019 5/11/2019 5/10/2019 5/8/2019 5/4/2019    SBP (mmHg) 170 141 124 163 166    DBP (mmHg) 89 82 80 100 100    Pulse 103 88 99 89 88          Last 6 Patient Entered Readings                                          Most Recent A1c: 8.7% on 4/11/2019  (Goal: 8%)     Recent Readings 5/14/2019 5/11/2019 5/10/2019 5/8/2019 5/4/2019    Blood Glucose (mg/dL) 129 145 183 106 154        Patient states she is not sure why BP was elevated yesterday.  Reviewed timing of medications and technique.    Digital Medicine: Health  Follow Up    Lifestyle Modifications:    1.Dietary Modifications (Sodium intake <2,000mg/day, food labels, dining out): deferred    2.Physical Activity: Reports she is unable to exercise due to an injured knee.  Reports the orthopedist advised her not to walk on the treadmill and use a stationary bike. States the bike is in storage and cannot get it out at this time.  Encouraged patient to find time to get the bike out of storage.  States her BP is elevated after exercise.  Encouraged patient that exercise helps BP over time.    3.Medication Therapy: Patient has been compliant with the medication regimen.    4.Patient has the following medication side effects/concerns: none  (Frequency/Alleviating factors/Precipitating factors, etc.)     Follow up with Mrs. Kaylee Urena Nick completed. No further questions or concerns. Will continue to follow up to achieve health goals.

## 2019-05-16 ENCOUNTER — LAB VISIT (OUTPATIENT)
Dept: LAB | Facility: HOSPITAL | Age: 74
End: 2019-05-16
Payer: MEDICARE

## 2019-05-16 ENCOUNTER — DOCUMENTATION ONLY (OUTPATIENT)
Dept: REHABILITATION | Facility: HOSPITAL | Age: 74
End: 2019-05-16

## 2019-05-16 ENCOUNTER — OFFICE VISIT (OUTPATIENT)
Dept: INTERNAL MEDICINE | Facility: CLINIC | Age: 74
End: 2019-05-16
Payer: MEDICARE

## 2019-05-16 VITALS
WEIGHT: 226.44 LBS | DIASTOLIC BLOOD PRESSURE: 84 MMHG | HEIGHT: 62 IN | BODY MASS INDEX: 41.67 KG/M2 | SYSTOLIC BLOOD PRESSURE: 134 MMHG

## 2019-05-16 DIAGNOSIS — R53.81 PHYSICAL DECONDITIONING: ICD-10-CM

## 2019-05-16 DIAGNOSIS — G47.33 OSA ON CPAP: ICD-10-CM

## 2019-05-16 DIAGNOSIS — N18.30 TYPE 2 DIABETES MELLITUS WITH STAGE 3 CHRONIC KIDNEY DISEASE, WITHOUT LONG-TERM CURRENT USE OF INSULIN: ICD-10-CM

## 2019-05-16 DIAGNOSIS — Z96.652 STATUS POST TOTAL LEFT KNEE REPLACEMENT: ICD-10-CM

## 2019-05-16 DIAGNOSIS — Z00.00 ENCOUNTER FOR PREVENTIVE HEALTH EXAMINATION: Primary | ICD-10-CM

## 2019-05-16 DIAGNOSIS — N18.30 CKD (CHRONIC KIDNEY DISEASE) STAGE 3, GFR 30-59 ML/MIN: ICD-10-CM

## 2019-05-16 DIAGNOSIS — M81.0 AGE-RELATED OSTEOPOROSIS WITHOUT CURRENT PATHOLOGICAL FRACTURE: ICD-10-CM

## 2019-05-16 DIAGNOSIS — E11.22 TYPE 2 DIABETES MELLITUS WITH STAGE 3 CHRONIC KIDNEY DISEASE, WITHOUT LONG-TERM CURRENT USE OF INSULIN: ICD-10-CM

## 2019-05-16 DIAGNOSIS — E66.01 MORBID OBESITY WITH BODY MASS INDEX (BMI) OF 40.0 OR HIGHER: ICD-10-CM

## 2019-05-16 DIAGNOSIS — E11.8 TYPE 2 DIABETES MELLITUS WITH COMPLICATION, WITHOUT LONG-TERM CURRENT USE OF INSULIN: ICD-10-CM

## 2019-05-16 DIAGNOSIS — I50.30 HYPERTENSIVE HEART DISEASE WITH DIASTOLIC HEART FAILURE: ICD-10-CM

## 2019-05-16 DIAGNOSIS — E66.9 DIABETES MELLITUS TYPE 2 IN OBESE: ICD-10-CM

## 2019-05-16 DIAGNOSIS — E55.9 VITAMIN D DEFICIENCY: ICD-10-CM

## 2019-05-16 DIAGNOSIS — I11.0 HYPERTENSIVE HEART DISEASE WITH DIASTOLIC HEART FAILURE: ICD-10-CM

## 2019-05-16 DIAGNOSIS — E78.2 MIXED HYPERLIPIDEMIA: ICD-10-CM

## 2019-05-16 DIAGNOSIS — E11.69 DIABETES MELLITUS TYPE 2 IN OBESE: ICD-10-CM

## 2019-05-16 DIAGNOSIS — Z86.73 HISTORY OF TIA (TRANSIENT ISCHEMIC ATTACK): ICD-10-CM

## 2019-05-16 PROBLEM — G45.9 TIA (TRANSIENT ISCHEMIC ATTACK): Status: RESOLVED | Noted: 2018-11-05 | Resolved: 2019-05-16

## 2019-05-16 LAB
25(OH)D3+25(OH)D2 SERPL-MCNC: 54 NG/ML (ref 30–96)
ALBUMIN SERPL BCP-MCNC: 3.2 G/DL (ref 3.5–5.2)
ALP SERPL-CCNC: 75 U/L (ref 55–135)
ALT SERPL W/O P-5'-P-CCNC: 25 U/L (ref 10–44)
ANION GAP SERPL CALC-SCNC: 8 MMOL/L (ref 8–16)
AST SERPL-CCNC: 17 U/L (ref 10–40)
BILIRUB SERPL-MCNC: 0.3 MG/DL (ref 0.1–1)
BUN SERPL-MCNC: 20 MG/DL (ref 8–23)
CALCIUM SERPL-MCNC: 10.3 MG/DL (ref 8.7–10.5)
CHLORIDE SERPL-SCNC: 104 MMOL/L (ref 95–110)
CO2 SERPL-SCNC: 27 MMOL/L (ref 23–29)
CREAT SERPL-MCNC: 1.1 MG/DL (ref 0.5–1.4)
EST. GFR  (AFRICAN AMERICAN): 58 ML/MIN/1.73 M^2
EST. GFR  (NON AFRICAN AMERICAN): 50.3 ML/MIN/1.73 M^2
ESTIMATED AVG GLUCOSE: 183 MG/DL (ref 68–131)
GLUCOSE SERPL-MCNC: 145 MG/DL (ref 70–110)
HBA1C MFR BLD HPLC: 8 % (ref 4–5.6)
POTASSIUM SERPL-SCNC: 4.4 MMOL/L (ref 3.5–5.1)
PROT SERPL-MCNC: 8 G/DL (ref 6–8.4)
SODIUM SERPL-SCNC: 139 MMOL/L (ref 136–145)
TSH SERPL DL<=0.005 MIU/L-ACNC: 2.43 UIU/ML (ref 0.4–4)

## 2019-05-16 PROCEDURE — G0439 PPPS, SUBSEQ VISIT: HCPCS | Mod: S$GLB,,, | Performed by: NURSE PRACTITIONER

## 2019-05-16 PROCEDURE — 3045F PR MOST RECENT HEMOGLOBIN A1C LEVEL 7.0-9.0%: CPT | Mod: CPTII,S$GLB,, | Performed by: NURSE PRACTITIONER

## 2019-05-16 PROCEDURE — 84443 ASSAY THYROID STIM HORMONE: CPT

## 2019-05-16 PROCEDURE — 3079F DIAST BP 80-89 MM HG: CPT | Mod: CPTII,S$GLB,, | Performed by: NURSE PRACTITIONER

## 2019-05-16 PROCEDURE — G0439 PR MEDICARE ANNUAL WELLNESS SUBSEQUENT VISIT: ICD-10-PCS | Mod: S$GLB,,, | Performed by: NURSE PRACTITIONER

## 2019-05-16 PROCEDURE — 83036 HEMOGLOBIN GLYCOSYLATED A1C: CPT

## 2019-05-16 PROCEDURE — 80053 COMPREHEN METABOLIC PANEL: CPT

## 2019-05-16 PROCEDURE — 99999 PR PBB SHADOW E&M-EST. PATIENT-LVL IV: ICD-10-PCS | Mod: PBBFAC,,, | Performed by: NURSE PRACTITIONER

## 2019-05-16 PROCEDURE — 3045F PR MOST RECENT HEMOGLOBIN A1C LEVEL 7.0-9.0%: ICD-10-PCS | Mod: CPTII,S$GLB,, | Performed by: NURSE PRACTITIONER

## 2019-05-16 PROCEDURE — 3075F SYST BP GE 130 - 139MM HG: CPT | Mod: CPTII,S$GLB,, | Performed by: NURSE PRACTITIONER

## 2019-05-16 PROCEDURE — 99999 PR PBB SHADOW E&M-EST. PATIENT-LVL IV: CPT | Mod: PBBFAC,,, | Performed by: NURSE PRACTITIONER

## 2019-05-16 PROCEDURE — 3075F PR MOST RECENT SYSTOLIC BLOOD PRESS GE 130-139MM HG: ICD-10-PCS | Mod: CPTII,S$GLB,, | Performed by: NURSE PRACTITIONER

## 2019-05-16 PROCEDURE — 82306 VITAMIN D 25 HYDROXY: CPT

## 2019-05-16 PROCEDURE — 36415 COLL VENOUS BLD VENIPUNCTURE: CPT

## 2019-05-16 PROCEDURE — 3079F PR MOST RECENT DIASTOLIC BLOOD PRESSURE 80-89 MM HG: ICD-10-PCS | Mod: CPTII,S$GLB,, | Performed by: NURSE PRACTITIONER

## 2019-05-16 NOTE — PROGRESS NOTES
"Kaylee Romero presented for a  Medicare AWV and comprehensive Health Risk Assessment today. The following components were reviewed and updated:    · Medical history  · Family History  · Social history  · Allergies and Current Medications  · Health Risk Assessment  · Health Maintenance  · Care Team     ** See Completed Assessments for Annual Wellness Visit within the encounter summary.**       The following assessments were completed:  · Living Situation  · CAGE  · Depression Screening  · Timed Get Up and Go  · Whisper Test  · Cognitive Function Screening  ·   ·   · Nutrition Screening  · ADL Screening  · PAQ Screening    Vitals:    05/16/19 1420   BP: 134/84   Weight: 102.7 kg (226 lb 6.6 oz)   Height: 5' 2" (1.575 m)     Body mass index is 41.41 kg/m².  Physical Exam   Constitutional: She is oriented to person, place, and time. She appears well-developed.   obese   HENT:   Head: Normocephalic and atraumatic.   Nose: Nose normal.   Eyes: Conjunctivae and EOM are normal.   Cardiovascular: Normal rate, regular rhythm, normal heart sounds and intact distal pulses.   No murmur heard.  Pulmonary/Chest: Effort normal and breath sounds normal.   Musculoskeletal: Normal range of motion.   Neurological: She is alert and oriented to person, place, and time.   Skin: Skin is warm and dry.   Psychiatric: She has a normal mood and affect. Her behavior is normal. Judgment and thought content normal.   Nursing note and vitals reviewed.        Diagnoses and health risks identified today and associated recommendations/orders:    1. Encounter for preventive health examination  Assessment performed. Health maintenance updated. Chart review completed.  Discussed shingles vaccine.    2. Type 2 diabetes mellitus with complication, without long-term current use of insulin  Chronic. Continue current regimen as instructed. Followed by Digital Medicine and Endocrinology.    3. Type 2 diabetes mellitus with stage 3 chronic kidney disease, " without long-term current use of insulin  Chronic. Continue current regimen as instructed. Followed by Digital Medicine.    4. Diabetes mellitus type 2 in obese  Chronic. Continue current regimen as instructed. Followed by Digital Medicine and Endocrinology.    5. Morbid obesity with body mass index (BMI) of 40.0 or higher  Sedentary. Plans to start using stationary bike. Reduce carb intake. Followed by PCP.    6. CKD (chronic kidney disease) stage 3, GFR 30-59 ml/min  Chronic. Stable with current regimen. Followed by PCP.    7. Hypertensive heart disease with diastolic heart failure  Chronic. Continue regimen as instructed. Followed by Cardiology.    8. History of TIA (transient ischemic attack)  Noted. Followed by Neurology.  Stable.    9. Mixed hyperlipidemia  Chronic. Stable with current regimen. Followed by PCP.    10. Status post total left knee replacement 5/1/2017  Noted.    11. KAMRAN on CPAP  Noted.    12. Vitamin D deficiency  Chronic. Stable. Followed by PCP.    13. Age-related osteoporosis without current pathological fracture  Chronic. Continue current regimen. Followed by Endocrinology    14. Physical deconditioning  Discussed.      Provided Kaylee with a 5-10 year written screening schedule and personal prevention plan. Recommendations were developed using the USPSTF age appropriate recommendations. Education, counseling, and referrals were provided as needed. After Visit Summary printed and given to patient which includes a list of additional screenings\tests needed.    Follow up for Annual Wellness Visit in 1 year, follow up with PCP as instructed, ;sooner if problems.    BRIAN Ortiz       I offered to discuss end of life issues, including information on how to make advance directives that the patient could use to name someone who would make medical decisions on their behalf if they became too ill to make themselves.    ___Patient declined  _X_Patient is interested, I provided paper work and  offered to discuss.

## 2019-05-16 NOTE — PATIENT INSTRUCTIONS
Counseling and Referral of Other Preventative  (Italic type indicates deductible and co-insurance are waived)    Patient Name: Kaylee Romero  Today's Date: 5/16/2019    Health Maintenance       Date Due Completion Date    Shingles Vaccine (2 of 3) 10/29/2017 9/3/2017    DEXA SCAN 03/29/2019 3/29/2017    Influenza Vaccine 11/08/2019 (Originally 8/1/2019) 4/18/2018 (Declined)    Override on 4/18/2018: Declined    Override on 1/6/2017: Declined    Colonoscopy 03/19/2020 (Originally 5/14/2018) 5/14/2008    Hemoglobin A1c 07/11/2019 4/11/2019    Foot Exam 11/08/2019 11/8/2018    Mammogram 12/13/2019 12/13/2018    Eye Exam 02/21/2020 2/21/2019    Override on 7/24/2018: Done    High Dose Statin 03/31/2020 3/31/2019    Lipid Panel 04/11/2020 4/11/2019    TETANUS VACCINE 07/18/2025 7/18/2015        No orders of the defined types were placed in this encounter.    The following information is provided to all patients.  This information is to help you find resources for any of the problems found today that may be affecting your health:                Living healthy guide: www.[facility].[place].gov      Understanding Diabetes: www.diabetes.org      Eating healthy: www.cdc.gov/healthyweight      CDC home safety checklist: www.cdc.gov/steadi/patient.html      Agency on Aging: www.goea.[place].[facility]      Alcoholics anonymous (AA): www.aa.org      Physical Activity: www.jay.nih.gov/tn1evyy      Tobacco use: www.quitwithusla.org

## 2019-05-16 NOTE — PROGRESS NOTES
PT/PTA met face to face to discuss pt's treatment plan and progress towards established goals. Pt will be seen by a physical therapist minimally every 6th visit or every 30 days.      Angi Smyth PTA

## 2019-05-17 DIAGNOSIS — E11.22 TYPE 2 DIABETES MELLITUS WITH STAGE 3 CHRONIC KIDNEY DISEASE, WITHOUT LONG-TERM CURRENT USE OF INSULIN: ICD-10-CM

## 2019-05-17 DIAGNOSIS — N18.30 TYPE 2 DIABETES MELLITUS WITH STAGE 3 CHRONIC KIDNEY DISEASE, WITHOUT LONG-TERM CURRENT USE OF INSULIN: ICD-10-CM

## 2019-05-17 RX ORDER — DULAGLUTIDE 1.5 MG/.5ML
INJECTION, SOLUTION SUBCUTANEOUS
Qty: 2 ML | Refills: 5 | Status: ON HOLD | OUTPATIENT
Start: 2019-05-17 | End: 2019-09-16 | Stop reason: HOSPADM

## 2019-05-20 DIAGNOSIS — E11.21 TYPE 2 DIABETES MELLITUS WITH MACROALBUMINURIC DIABETIC NEPHROPATHY: ICD-10-CM

## 2019-05-20 RX ORDER — SIMVASTATIN 10 MG/1
TABLET, FILM COATED ORAL
Qty: 90 TABLET | Refills: 2 | Status: ON HOLD | OUTPATIENT
Start: 2019-05-20 | End: 2019-07-12 | Stop reason: HOSPADM

## 2019-05-21 ENCOUNTER — CLINICAL SUPPORT (OUTPATIENT)
Dept: REHABILITATION | Facility: HOSPITAL | Age: 74
End: 2019-05-21
Payer: MEDICARE

## 2019-05-21 DIAGNOSIS — M25.512 CHRONIC LEFT SHOULDER PAIN: ICD-10-CM

## 2019-05-21 DIAGNOSIS — G89.29 CHRONIC LEFT SHOULDER PAIN: ICD-10-CM

## 2019-05-21 PROCEDURE — 97110 THERAPEUTIC EXERCISES: CPT | Mod: PO

## 2019-05-21 NOTE — PROGRESS NOTES
Discharge summary  Physical Therapy Daily Treatment Note     Name: Kaylee Urena Romero  Clinic Number: [id number]    Therapy Diagnosis:   Encounter Diagnosis   Name Primary?    Chronic left shoulder pain      Physician: Treasure Sorenson, *    Visit Date: 5/21/2019    Physician Orders: PT Eval and Treat Impingement syndrome Left, Chronic L shoulder pain  Medical Diagnosis from Referral:   M75.42 (ICD-10-CM) - Shoulder impingement syndrome, left   M25.512,G89.29 (ICD-10-CM) - Chronic left shoulder pain   Evaluation Date: 4/24/2019  Authorization Period Expiration: 06/24/2019  Plan of Care Expiration: 7/24/19  Visit # / Visits authorized: 6/12     Time In: 1:00 pm  Time Out: 2:00 pm  Total Billable Time: 30 minutes (TE-2)    Precautions: Standard, Diabetes and HTN    Subjective     Pt reports: she is feeling much better and is ready to be discharged from therapy  She was compliant with home exercise program.  Response to previous treatment/Functional change: reaching is easier  Pain: 0/10  Location: left shoulder      Objective     BP taken in L UE in sitting   BP upon arrival: 142/90    Kaylee received therapeutic exercises to develop strength, endurance, ROM, flexibility, posture and core stabilization for 45 minutes including:    UE bike 3 min forward 3 min back lvl 1.0    Supine    3# [place] flexion x 20   Protraction unilateral 7# DB 2x15 each    Standing:    Shoulder OTB I's 2 x 10   Shoulder OTB Rows 2 x 10   Shoulder ER OTB 2 x 10   Shoulder IR OTB 2 x 10   Serratus wall slides with foam roller 2 x 15  Bruggers OTB: 3x10  Wall slides abduction: 3x10   ABCs: x2      Pt received CP to R shoulder for 10'.      Home Exercises Provided and Patient Education Provided     Education provided:   - HEP reviewed and ABCs added    Written Home Exercises Provided: yes.  Exercises were reviewed and Kaylee was able to demonstrate them prior to the end of the session.  Kaylee demonstrated good  understanding of the education  provided.     See EMR under Patient Instructions for exercises provided 5/9/19.    Assessment     Pt continues to progress well with with no shoulder pain reported. Pt is satisfied with her results and was in agreement that she is ready to be discharged from therapy . Pts HEP was reviewed with her to which she demonstrated good carryover with.   Pt prognosis is Good.     Pt's spiritual, cultural and educational needs considered and pt agreeable to plan of care and goals.     Anticipated barriers to physical therapy: HTN, uncontrolled blood sugar, HEP compliance    Goals: Goals:  Short Term Goals: 3 weeks   1. Pt will be independent with HEP, demonstrate good form and report participating in walking program for at least 15 minutes/day 5 days/week - met  2. Pt will demonstrate pain free PROM of the L shoulder of at least 140 deg flexion, 110 deg abduction and 50 degrees of ER/IR @90 deg abduction to demonstrate improved functional mobility - met  3. Pt will report good compliance with prescribed medications, will report drinking at least 60 ounces of water/day and will report one healthy dietary change in order to improve tolerance to physical activity and reduce the risk of chest pain on exertion - met     Long Term Goals: 10 weeks   1. Pt will demonstrate full/symmetrical AROM to bilat shoulders reporting no pain at end range to demonstrate improved functional mobility - met  2. Pt will be able to independently don/doff shirt/ bra strap without reports of pain to maximize pt's independence - met  3. Pt will be able to lift 5# overhead 5x and be able to carry 15# 25ft with L UE reporting less than 2/10 pain in order to demonstrate improved shoulder strength and function. - progressing    Plan     Discharge from PT    Leonard Germain, PT, DPT    Angi Smyth, PTA

## 2019-05-21 NOTE — PROGRESS NOTES
Subjective:      Patient ID: Kaylee Romero is a 74 y.o. female.    Chief Complaint:  Follow-up    History of Present Illness  Kaylee Romero presents today for follow up of T2DM . Last seen 1/11/19.     With regards to the diabetes:    Diagnosed: 2013  Known complications:  DKA-  RN-  PN-  Nephropathy-    Current regimen:  Trulicity 1.5 mg weekly  Glipizide 10 mg XR - April 24th, PCP increased her to 20 mg tablet daily     Other medications tried:  Jardiance    Glucose Monitor:  1 times a day testing  Log reviewed: yes, digital medicine program  5/23/2019 12:33 PM 5/23/2019 12:33     129         5/22/2019 12:59 PM 5/22/2019  1:00     152         5/21/2019  3:15 PM 5/21/2019  3:15      105        5/18/2019 10:20 AM 5/18/2019 10:21    167          5/17/2019 10:48 AM 5/17/2019 10:48    175          5/15/2019 12:09 PM 5/15/2019 12:10     179         5/14/2019  3:26 PM 5/14/2019  3:30         129     5/11/2019  9:30 AM 5/11/2019  9:30    145            5/23/2019 12:33 PM 5/23/2019 12:33     129         5/22/2019 12:59 PM 5/22/2019  1:00     152         5/21/2019  3:15 PM 5/21/2019  3:15      105        5/18/2019 10:20 AM 5/18/2019 10:21    167          5/17/2019 10:48 AM 5/17/2019 10:48    175          5/15/2019 12:09 PM 5/15/2019 12:10     179         5/14/2019  3:26 PM 5/14/2019  3:30         129     5/11/2019  9:30 AM 5/11/2019  9:30    145            Diet/Exercise:  Eats 2 meals a day.   Snacks: none   Drinks: water  Exercise - tries to stay active     Hypoglycemia:  Yes, did not need assistance. One occasion, thinks it was a meter error.   Knows how to correct with 15 grams of carbs- juice, coke, or a peppermint.      Education - last visit: none  Eye Exam: 2/25/19  Podiatry: 8/29/19  Denies history of pancreatitis & personal/family history of medullary thyroid cancer.     Diabetes Management Status  Statin:  Taking  ACE/ARB: Taking  Screening or Prevention Patient's value Goal Complete/Controlled?   HgA1C Testing and Control   Lab Results   Component Value Date    HGBA1C 8.0 (H) 05/16/2019    Annually/Less than 8% No   Lipid profile : 04/11/2019 Annually Yes   LDL control Lab Results   Component Value Date    LDLCALC 83.2 04/11/2019    Annually/Less than 100 mg/dl  Yes   Nephropathy screening Lab Results   Component Value Date    LABMICR 188.0 10/24/2017     Lab Results   Component Value Date    PROTEINUA 2+ (A) 04/20/2017    Annually No   Blood pressure BP Readings from Last 1 Encounters:   05/23/19 136/82    Less than 140/90 Yes   Dilated retinal exam : 02/21/2019 Annually Yes   Foot exam   : 11/08/2018 Annually Yes     With regards to osteoporosis:   Last BMD 3/2017  Osteoporosis of the left femoral neck.  Normal bone mineral density of the lumbar spine.  Ten year probability of major osteoporotic fracture is 9.5%, and hip fracture is 2.1%.  BMD 5/23/19    Calcium intake?  1000 mg a day   Vit D intake?  2000 iu a day   Weight bearing exercise?   Walking   Recent falls? August 2018; September 2018 - no fractures and no falls from a standing position.      They have made fall precautions in house     No recent fractures   No significant height loss (>2 inches)    tob use ?  None  etoh use? None    Family history: none     No current diarrhea or h/o malabsorption    Menopause at age 56    Denies chronic exposure to anticoagulants, proton pump inhibitors or antiseizure medications.   +Steroid injections knees    Denies GERD, indigestion, or gastric bypass surgery.     Denies history of thyroid disease, anemia, kidney stones or kidney disease.     Denies active malignancy, history of malignancy the involved the bone, prior radiation treatment, or unexplained elevations of alk phos on labs     With regards to the vitamin d deficiency:   Ref. Range 5/16/2019 13:27   Vit D, 25-Hydroxy Latest Ref Range: 30 - 96 ng/mL 54      currently taking otc 3000iu a day    Review of Systems   Constitutional: Negative for unexpected weight change.   Eyes: Negative for visual disturbance.   Respiratory: Negative for shortness of breath.    Cardiovascular: Negative for chest pain.   Gastrointestinal: Negative for abdominal pain.   Endocrine: Negative for cold intolerance, heat intolerance, polydipsia, polyphagia and polyuria.   Musculoskeletal: Negative for arthralgias.   Skin: Negative for wound.   Neurological: Negative for headaches.   Hematological: Does not bruise/bleed easily.   Psychiatric/Behavioral: Negative for sleep disturbance.     Objective:   Physical Exam   Neck: No thyromegaly present.   Cardiovascular: Normal rate.   No edema present   Pulmonary/Chest: Effort normal.   Abdominal: Soft.   Vitals reviewed.  Appropriate footwear, Foot exam deferred 11/2018 follows with podiatry  No ulcers or wounds.   injection sites are ok. No lipo hypertropthy or atrophy    Body mass index is 39.79 kg/m².    Lab Review:   Lab Results   Component Value Date    HGBA1C 8.0 (H) 05/16/2019     Lab Results   Component Value Date    CHOL 145 04/11/2019    HDL 49 04/11/2019    LDLCALC 83.2 04/11/2019    TRIG 64 04/11/2019    CHOLHDL 33.8 04/11/2019     Lab Results   Component Value Date     05/16/2019    K 4.4 05/16/2019     05/16/2019    CO2 27 05/16/2019     (H) 05/16/2019    BUN 20 05/16/2019    CREATININE 1.1 05/16/2019    CALCIUM 10.3 05/16/2019    PROT 8.0 05/16/2019    ALBUMIN 3.2 (L) 05/16/2019    BILITOT 0.3 05/16/2019    ALKPHOS 75 05/16/2019    AST 17 05/16/2019    ALT 25 05/16/2019    ANIONGAP 8 05/16/2019    ESTGFRAFRICA 58.0 (A) 05/16/2019    EGFRNONAA 50.3 (A) 05/16/2019    TSH 2.427 05/16/2019       Assessment and Plan     1. Type 2 diabetes mellitus with complication, without long-term current use of insulin  Hemoglobin A1c    Comprehensive metabolic panel    Microalbumin/creatinine urine ratio    TSH    T4, free   2.  Osteoporosis without current pathological fracture, unspecified osteoporosis type     3. Vitamin D deficiency     4. Morbid obesity with body mass index (BMI) of 40.0 or higher     5. CKD (chronic kidney disease) stage 3, GFR 30-59 ml/min     6. Hypertensive heart disease with diastolic heart failure     7. Mixed hyperlipidemia     8. KAMRAN on CPAP       Type 2 diabetes mellitus with complication, without long-term current use of insulin  -- Labs prior  -- Medication Changes:   Continue current regimen  In depth discussion into continuing 20mg of glipizide with in depth education to hypoglycemia precautions  -- Reviewed goals of therapy are to get the best control we can without hypoglycemia  -- Insulin injection sites and proper rotation instructed.    -- Advised frequent self blood glucose monitoring.  Patient encouraged to document glucose results and bring them to every clinic visit.  -- Hypoglycemia precautions discussed. Instructed on precautions before driving.    -- Call for Bg repeatedly < 90 or > 180.   -- Close adherence to lifestyle changes recommended.   -- Periodic follow ups for eye evaluations, foot care and dental care suggested. UTD    Osteoporosis without current pathological fracture  -- Osteoporosis per BMD in 2017, at that time patient did not want treatment  -- Reassuring she is not fracturing  -- Patient willing to discuss treatment options  -- Pending BMD from today    Vitamin D deficiency  -- Continue Vit D supplement daily     Morbid obesity with body mass index (BMI) of 40.0 or higher  -- encouraged dietary and lifestyle modifications   -- emphasized weight loss goals       CKD (chronic kidney disease) stage 3, GFR 30-59 ml/min  -- Stable    Hypertensive heart disease with diastolic heart failure  -- on ARB  -- Controlled  -- Blood pressure goals discussed with patient    Hyperlipidemia  -- Controlled   -- On statin per ADA recommendations    KAMRAN on CPAP  -- Continue CPAP use    Follow up  in about 4 months (around 9/23/2019).  Labs prior  Waiting for BMD results will call with results and to discuss treatment options

## 2019-05-23 ENCOUNTER — HOSPITAL ENCOUNTER (OUTPATIENT)
Dept: RADIOLOGY | Facility: CLINIC | Age: 74
Discharge: HOME | End: 2019-05-23
Attending: NURSE PRACTITIONER
Payer: MEDICARE

## 2019-05-23 ENCOUNTER — OFFICE VISIT (OUTPATIENT)
Dept: PODIATRY | Facility: CLINIC | Age: 74
End: 2019-05-23
Payer: MEDICARE

## 2019-05-23 ENCOUNTER — OFFICE VISIT (OUTPATIENT)
Dept: ENDOCRINOLOGY | Facility: CLINIC | Age: 74
End: 2019-05-23
Payer: MEDICARE

## 2019-05-23 VITALS — HEIGHT: 62 IN | WEIGHT: 226 LBS | BODY MASS INDEX: 41.59 KG/M2

## 2019-05-23 VITALS
BODY MASS INDEX: 39.8 KG/M2 | WEIGHT: 224.63 LBS | DIASTOLIC BLOOD PRESSURE: 82 MMHG | SYSTOLIC BLOOD PRESSURE: 136 MMHG | HEART RATE: 88 BPM | HEIGHT: 63 IN

## 2019-05-23 DIAGNOSIS — E66.01 MORBID OBESITY WITH BODY MASS INDEX (BMI) OF 40.0 OR HIGHER: ICD-10-CM

## 2019-05-23 DIAGNOSIS — N18.30 CKD (CHRONIC KIDNEY DISEASE) STAGE 3, GFR 30-59 ML/MIN: ICD-10-CM

## 2019-05-23 DIAGNOSIS — M21.619 BUNION: ICD-10-CM

## 2019-05-23 DIAGNOSIS — I11.0 HYPERTENSIVE HEART DISEASE WITH DIASTOLIC HEART FAILURE: ICD-10-CM

## 2019-05-23 DIAGNOSIS — M81.0 OSTEOPOROSIS WITHOUT CURRENT PATHOLOGICAL FRACTURE, UNSPECIFIED OSTEOPOROSIS TYPE: ICD-10-CM

## 2019-05-23 DIAGNOSIS — B35.1 DERMATOPHYTOSIS OF NAIL: ICD-10-CM

## 2019-05-23 DIAGNOSIS — E55.9 VITAMIN D DEFICIENCY: ICD-10-CM

## 2019-05-23 DIAGNOSIS — I50.30 HYPERTENSIVE HEART DISEASE WITH DIASTOLIC HEART FAILURE: ICD-10-CM

## 2019-05-23 DIAGNOSIS — L84 PRE-ULCERATIVE CORN OR CALLOUS: ICD-10-CM

## 2019-05-23 DIAGNOSIS — M81.0 AGE-RELATED OSTEOPOROSIS WITHOUT CURRENT PATHOLOGICAL FRACTURE: ICD-10-CM

## 2019-05-23 DIAGNOSIS — E11.49 TYPE II DIABETES MELLITUS WITH NEUROLOGICAL MANIFESTATIONS: Primary | ICD-10-CM

## 2019-05-23 DIAGNOSIS — G47.33 OSA ON CPAP: ICD-10-CM

## 2019-05-23 DIAGNOSIS — E78.2 MIXED HYPERLIPIDEMIA: ICD-10-CM

## 2019-05-23 DIAGNOSIS — E11.8 TYPE 2 DIABETES MELLITUS WITH COMPLICATION, WITHOUT LONG-TERM CURRENT USE OF INSULIN: Primary | ICD-10-CM

## 2019-05-23 PROCEDURE — 3288F FALL RISK ASSESSMENT DOCD: CPT | Mod: CPTII,S$GLB,, | Performed by: NURSE PRACTITIONER

## 2019-05-23 PROCEDURE — 11056 PARNG/CUTG B9 HYPRKR LES 2-4: CPT | Mod: Q9,S$GLB,, | Performed by: PODIATRIST

## 2019-05-23 PROCEDURE — 99999 PR PBB SHADOW E&M-EST. PATIENT-LVL III: ICD-10-PCS | Mod: PBBFAC,,, | Performed by: PODIATRIST

## 2019-05-23 PROCEDURE — 77080 DXA BONE DENSITY AXIAL: CPT | Mod: TC

## 2019-05-23 PROCEDURE — 99999 PR PBB SHADOW E&M-EST. PATIENT-LVL III: CPT | Mod: PBBFAC,,, | Performed by: NURSE PRACTITIONER

## 2019-05-23 PROCEDURE — 1100F PR PT FALLS ASSESS DOC 2+ FALLS/FALL W/INJURY/YR: ICD-10-PCS | Mod: CPTII,S$GLB,, | Performed by: NURSE PRACTITIONER

## 2019-05-23 PROCEDURE — 3075F PR MOST RECENT SYSTOLIC BLOOD PRESS GE 130-139MM HG: ICD-10-PCS | Mod: CPTII,S$GLB,, | Performed by: NURSE PRACTITIONER

## 2019-05-23 PROCEDURE — 77080 DXA BONE DENSITY AXIAL: CPT | Mod: 26,,, | Performed by: INTERNAL MEDICINE

## 2019-05-23 PROCEDURE — 11721 ROUTINE FOOT CARE: ICD-10-PCS | Mod: 59,Q9,S$GLB, | Performed by: PODIATRIST

## 2019-05-23 PROCEDURE — 3288F PR FALLS RISK ASSESSMENT DOCUMENTED: ICD-10-PCS | Mod: CPTII,S$GLB,, | Performed by: PODIATRIST

## 2019-05-23 PROCEDURE — 99214 PR OFFICE/OUTPT VISIT, EST, LEVL IV, 30-39 MIN: ICD-10-PCS | Mod: S$GLB,,, | Performed by: NURSE PRACTITIONER

## 2019-05-23 PROCEDURE — 11056 ROUTINE FOOT CARE: ICD-10-PCS | Mod: Q9,S$GLB,, | Performed by: PODIATRIST

## 2019-05-23 PROCEDURE — 99999 PR PBB SHADOW E&M-EST. PATIENT-LVL III: CPT | Mod: PBBFAC,,, | Performed by: PODIATRIST

## 2019-05-23 PROCEDURE — 99213 PR OFFICE/OUTPT VISIT, EST, LEVL III, 20-29 MIN: ICD-10-PCS | Mod: 25,S$GLB,, | Performed by: PODIATRIST

## 2019-05-23 PROCEDURE — 3079F PR MOST RECENT DIASTOLIC BLOOD PRESSURE 80-89 MM HG: ICD-10-PCS | Mod: CPTII,S$GLB,, | Performed by: NURSE PRACTITIONER

## 2019-05-23 PROCEDURE — 3045F PR MOST RECENT HEMOGLOBIN A1C LEVEL 7.0-9.0%: ICD-10-PCS | Mod: CPTII,S$GLB,, | Performed by: PODIATRIST

## 2019-05-23 PROCEDURE — 99213 OFFICE O/P EST LOW 20 MIN: CPT | Mod: 25,S$GLB,, | Performed by: PODIATRIST

## 2019-05-23 PROCEDURE — 11721 DEBRIDE NAIL 6 OR MORE: CPT | Mod: 59,Q9,S$GLB, | Performed by: PODIATRIST

## 2019-05-23 PROCEDURE — 3045F PR MOST RECENT HEMOGLOBIN A1C LEVEL 7.0-9.0%: CPT | Mod: CPTII,S$GLB,, | Performed by: PODIATRIST

## 2019-05-23 PROCEDURE — 77080 DEXA BONE DENSITY SPINE HIP: ICD-10-PCS | Mod: 26,,, | Performed by: INTERNAL MEDICINE

## 2019-05-23 PROCEDURE — 99214 OFFICE O/P EST MOD 30 MIN: CPT | Mod: S$GLB,,, | Performed by: NURSE PRACTITIONER

## 2019-05-23 PROCEDURE — 99499 UNLISTED E&M SERVICE: CPT | Mod: S$GLB,,, | Performed by: NURSE PRACTITIONER

## 2019-05-23 PROCEDURE — 1100F PTFALLS ASSESS-DOCD GE2>/YR: CPT | Mod: CPTII,S$GLB,, | Performed by: PODIATRIST

## 2019-05-23 PROCEDURE — 3288F PR FALLS RISK ASSESSMENT DOCUMENTED: ICD-10-PCS | Mod: CPTII,S$GLB,, | Performed by: NURSE PRACTITIONER

## 2019-05-23 PROCEDURE — 3045F PR MOST RECENT HEMOGLOBIN A1C LEVEL 7.0-9.0%: CPT | Mod: CPTII,S$GLB,, | Performed by: NURSE PRACTITIONER

## 2019-05-23 PROCEDURE — 1100F PR PT FALLS ASSESS DOC 2+ FALLS/FALL W/INJURY/YR: ICD-10-PCS | Mod: CPTII,S$GLB,, | Performed by: PODIATRIST

## 2019-05-23 PROCEDURE — 99499 RISK ADDL DX/OHS AUDIT: ICD-10-PCS | Mod: S$GLB,,, | Performed by: NURSE PRACTITIONER

## 2019-05-23 PROCEDURE — 3288F FALL RISK ASSESSMENT DOCD: CPT | Mod: CPTII,S$GLB,, | Performed by: PODIATRIST

## 2019-05-23 PROCEDURE — 3079F DIAST BP 80-89 MM HG: CPT | Mod: CPTII,S$GLB,, | Performed by: NURSE PRACTITIONER

## 2019-05-23 PROCEDURE — 3075F SYST BP GE 130 - 139MM HG: CPT | Mod: CPTII,S$GLB,, | Performed by: NURSE PRACTITIONER

## 2019-05-23 PROCEDURE — 1100F PTFALLS ASSESS-DOCD GE2>/YR: CPT | Mod: CPTII,S$GLB,, | Performed by: NURSE PRACTITIONER

## 2019-05-23 PROCEDURE — 3045F PR MOST RECENT HEMOGLOBIN A1C LEVEL 7.0-9.0%: ICD-10-PCS | Mod: CPTII,S$GLB,, | Performed by: NURSE PRACTITIONER

## 2019-05-23 PROCEDURE — 99999 PR PBB SHADOW E&M-EST. PATIENT-LVL III: ICD-10-PCS | Mod: PBBFAC,,, | Performed by: NURSE PRACTITIONER

## 2019-05-23 NOTE — ASSESSMENT & PLAN NOTE
-- Osteoporosis per BMD in 2017, at that time patient did not want treatment  -- Reassuring she is not fracturing  -- Patient willing to discuss treatment options  -- Pending BMD from today

## 2019-05-23 NOTE — PROGRESS NOTES
Subjective:       Patient ID: Kaylee Romero is a 74 y.o. female.    Chief Complaint: Diabetes Mellitus (PCP: Liz Roberts 03/28/2019 A1C: 05/16/2019 / 8.0) and Nail Care        Kaylee Romero is a 74 y.o. female    has a past medical history of Acid reflux, Arthritis, Cataract, CKD (chronic kidney disease) stage 3, GFR 30-59 ml/min, Diabetes mellitus, type 2, Dry mouth, Hyperlipidemia, Hypertension, KAMRAN (obstructive sleep apnea), and Osteoporosis. Pt presents to clinic for referral from primary for diabetic foot exam. Pt relates she has fungal toe nails and dry skin on the feet.   She admits she is not using Gold Bond for diabetics lotion as much as she should.   She is using Kerasal nail on her toenails with some improvement.    No other pedal complaints at this time.         PCP:  Liz Roberts MD  Last visit:    Chief Complaint   Patient presents with    Diabetes Mellitus     PCP: Liz Roberts 03/28/2019 A1C: 05/16/2019 / 8.0    Nail Care             Past Medical History:   Diagnosis Date    Acid reflux     Arthritis     Cataract     CKD (chronic kidney disease) stage 3, GFR 30-59 ml/min     Diabetes mellitus, type 2     Dry mouth     Hyperlipidemia 12/2/2014    Hypertension     KAMRAN (obstructive sleep apnea)     Osteoporosis              Current Outpatient Medications on File Prior to Visit   Medication Sig Dispense Refill    alcohol swabs (ALCOHOL PADS) PadM Apply 1 each topically as needed. 11 each 11    amLODIPine (NORVASC) 10 MG tablet Take 1 tablet (10 mg total) by mouth once daily. For Blood Pressure. 90 tablet 1    aspirin (ECOTRIN) 81 MG EC tablet Take 1 tablet (81 mg total) by mouth once daily. 1 tablet 0    blood sugar diagnostic Strp 1 strip by Misc.(Non-Drug; Combo Route) route once daily. 100 strip 3    blood-glucose meter kit Use as instructed 1 each 0    chlorthalidone (HYGROTEN) 25 MG Tab Take 1 tablet (25 mg total) by mouth once daily. 30 tablet 11     glipiZIDE (GLUCOTROL) 10 MG TR24 Take 2 tablets (20 mg total) by mouth daily with breakfast. 60 tablet 2    lancets Misc 1 lancet by Misc.(Non-Drug; Combo Route) route once daily. 100 each 3    simvastatin (ZOCOR) 10 MG tablet TAKE 1 TABLET(10 MG) BY MOUTH EVERY EVENING FOR CHOLESTEROL 90 tablet 2    TRULICITY 1.5 mg/0.5 mL PnIj INJECT 1.5 MG UNDER THE SKIN ONCE A WEEK 2 mL 5    valsartan (DIOVAN) 320 MG tablet Take 1 tablet (320 mg total) by mouth once daily. 90 tablet 1     No current facility-administered medications on file prior to visit.          Review of patient's allergies indicates:   Allergen Reactions    Keflex [cephalexin] Other (See Comments)     Turns orange    Bactrim [sulfamethoxazole-trimethoprim]      Generic version  Sulfa makes her sick         Social History     Socioeconomic History    Marital status:      Spouse name: Not on file    Number of children: 1    Years of education: Not on file    Highest education level: Not on file   Occupational History    Not on file   Social Needs    Financial resource strain: Not on file    Food insecurity:     Worry: Not on file     Inability: Not on file    Transportation needs:     Medical: Not on file     Non-medical: Not on file   Tobacco Use    Smoking status: Never Smoker    Smokeless tobacco: Never Used   Substance and Sexual Activity    Alcohol use: Yes     Alcohol/week: 0.0 oz     Comment: social drinker    Drug use: No    Sexual activity: Yes     Partners: Male     Birth control/protection: Post-menopausal   Lifestyle    Physical activity:     Days per week: Not on file     Minutes per session: Not on file    Stress: Not on file   Relationships    Social connections:     Talks on phone: Not on file     Gets together: Not on file     Attends [employer] service: Not on file     Active member of club or organization: Not on file     Attends meetings of clubs or organizations: Not on file     Relationship status: Not on  "file   Other Topics Concern    Not on file   Social History Narrative     with a son living locally.  at [employer], Retired 5/18.           Review of Systems   Constitutional: Negative.    HENT: Negative.    Eyes: Negative.    Respiratory: Negative.    Cardiovascular: Negative.    Gastrointestinal: Negative.    Endocrine: Negative.    Genitourinary: Negative.    Musculoskeletal: Negative.    Skin: Negative.    Allergic/Immunologic: Negative.    Neurological: Negative.    Hematological: Negative.    Psychiatric/Behavioral: Negative.        Objective:        Vitals:    05/23/19 0922   Weight: 102.5 kg (226 lb)   Height: 5' 2" (1.575 m)         Physical Exam   Constitutional: She is oriented to person, place, and time. She appears well-developed and well-nourished.   Cardiovascular: Intact distal pulses.   DP and PT pulses 2/4 loreta. No edema noted loreta. Capillary refill time < 3 seconds to digits 1-5, loreta.    Musculoskeletal: Normal range of motion. She exhibits deformity. She exhibits no edema or tenderness.   HAV noted to loreta 1st MPJ. 5/5 strength to all LE muscle groups. No POP noted   Neurological: She is alert and oriented to person, place, and time.   Sensation intact to digits, loreta, via SWMF   Skin: Skin is warm and dry. Capillary refill takes 2 to 3 seconds. No erythema.   Toenails x 10 are elongated by 1-3mm, mycotic with subungual debris      Psychiatric: She has a normal mood and affect. Her behavior is normal. Judgment and thought content normal.               Assessment:       Problem List Items Addressed This Visit     None      Visit Diagnoses     Type II diabetes mellitus with neurological manifestations    -  Primary    Relevant Orders    DIABETIC SHOES FOR HOME USE    Dermatophytosis of nail        Pre-ulcerative corn or callous        Relevant Orders    DIABETIC SHOES FOR HOME USE    Bunion        Relevant Orders    DIABETIC SHOES FOR HOME USE            Plan:          "     -I counseled the pt on her condition and management    -Shoe inspection. Diabetic Foot Education. Patient reminded of the importance of good nutrition and blood sugar control to help prevent podiatric complications of diabetes. Patient instructed on proper foot hygeine. We discussed wearing proper shoe gear, daily foot inspections, never walking without protective shoe gear    -Continue .  Available OTC.  Use daily for at least 6-8 months on the toenails.     -Continue Gold bond lotion for diabetics for dry skin on the feet.  Using daily.         Routine Foot Care  Date/Time: 5/23/2019 9:48 AM  Performed by: Donna Gillis DPM  Authorized by: Donna Gillis DPM     Consent Done?:  Yes (Verbal)  Hyperkeratotic Skin Lesions?: Yes    Number of trimmed lesions:  4  Location(s):  Left 1st Toe, Right 1st Toe, Left 1st Metatarsal Head and Right 1st Metatarsal Head    Nail Care Type:  Debride  Location(s): All  (Left 1st Toe, Left 3rd Toe, Left 2nd Toe, Left 4th Toe, Left 5th Toe, Right 1st Toe, Right 2nd Toe, Right 3rd Toe, Right 4th Toe and Right 5th Toe)  Patient tolerance:  Patient tolerated the procedure well with no immediate complications     With patient's permission, the toenails mentioned above were aggressively reduced and debrided using a nail nipper, removing all offending nail and debris. Utilizing a #15 scalpel, I trimmed the corns and calluses at the above mentioned location.      The patient will continue to monitor the areas daily, inspect the feet, wear protective shoe gear when ambulatory, and moisturizer to maintain skin integrity.            Donna Gillis DPM  NPI: [id number]         [facility] - PODIAT[facility]  [address] [address]  Dept: [phone]  Dept Fax: [phone]              Donna Gillis DPM  NPI: [id number]         [facility] - POD[facility]  [address] [address]  Dept: [phone]  Dept  Fax: [phone]

## 2019-05-23 NOTE — PATIENT INSTRUCTIONS
How to Check Your Feet    Below are tips to help you look for foot problems. Try to check your feet at the same time each day, such as when you get out of bed in the morning.    · Check the top of each foot. The tops of toes, back of the heel, and outer edge of the foot can get a lot of rubbing from poor-fitting shoes.    · Check the bottom of each foot. Daily wear and tear often leads to problems at pressure spots.    · Check the toes and nails. Fungal infections often occur between toes. Toenail problems can also be a sign of fungal infections or lead to breaks in the skin.    · Check your shoes, too. Loose objects inside a shoe can injure the foot. Use your hand to feel inside your shoes for things like gissell, loose stitching, or rough areas that could irritate your skin.        Diabetic Foot Care    Diabetes can lead to a number of different foot complications. Fortunately, most of these complications can be prevented with a little extra foot care. If diabetes is not well controlled, the high blood sugar can cause damage to blood vessels and result in poor circulation to the foot. When the skin does not get enough blood flow, it becomes prone to pressure sores and ulcers, which heal slowly.  High blood sugar can also damage nerves, interfering with the ability to feel pain and pressure. When you cant feel your foot normally, it is easy to injure your skin, bones and joints without knowing it. For these reasons diabetes increases the risk of fungal infections, bunions and ulcers. Deep ulcers can lead to bone infection. Gangrene is the most serious foot complication of diabetes. It usually occurs on the tips of the toes as blacked areas of skin. The black area is dead tissue. In severe cases, gangrene spreads to involve the entire toe, other toes and the entire foot. Foot or toe amputation may be required. Good foot care and blood sugar control can prevent this.    Home Care  1. Wear comfortable, proper fitting  shoes.  2. Wash your feet daily with warm water and mild soap.  3. After drying, apply a moisturizing cream or lotion.  4. Check your feet daily for skin breaks, blisters, swelling, or redness. Look between your toes also.  5. Wear cotton socks and change them every day.  6. Trim toe nails carefully and do not cut your cuticles.  7. Strive to keep your blood sugar under control with a combination of medicines, diet and activity.  8. If you smoke and have diabetes, it is very important that you stop. Smoking reduces blood flow to your foot.  9. Avoid activities that increase your risk of foot injury:  · Do not walk barefoot.  · Do not use heating pads or hot water bottles on your feet.  · Do not put your foot in a hot tub without first checking the temperature with your hand.  10) Schedule yearly foot exams.    Follow Up  with your doctor or as advised by our staff. Report any cut, puncture, scrape, other injury, blister, ingrown toenail or ulcer on your foot.    Get Prompt Medical Attention  if any of the following occur:  -- Open ulcer with pus draining from the wound  -- Increasing foot or leg pain  -- New areas of redness or swelling or tender areas of the foot    © 2842-6719 [employer]. [address] [place] [address], [place] [address]. All rights reserved. This information is not intended as a substitute for professional medical care. Always follow your healthcare professional's instructions.

## 2019-05-23 NOTE — ASSESSMENT & PLAN NOTE
-- Labs prior  -- Medication Changes:   Continue current regimen  In depth discussion into continuing 20mg of glipizide with in depth education to hypoglycemia precautions  -- Reviewed goals of therapy are to get the best control we can without hypoglycemia  -- Insulin injection sites and proper rotation instructed.    -- Advised frequent self blood glucose monitoring.  Patient encouraged to document glucose results and bring them to every clinic visit.  -- Hypoglycemia precautions discussed. Instructed on precautions before driving.    -- Call for Bg repeatedly < 90 or > 180.   -- Close adherence to lifestyle changes recommended.   -- Periodic follow ups for eye evaluations, foot care and dental care suggested. UTD

## 2019-05-28 NOTE — PROGRESS NOTES
Last 5 Patient Entered Readings                                      Current 30 Day Average: 148/90     Recent Readings 5/27/2019 5/24/2019 5/24/2019 5/24/2019 5/23/2019    SBP (mmHg) 133 120 120 147 131    DBP (mmHg) 94 80 80 87 82    Pulse 97 80 80 93 98          Last 6 Patient Entered Readings                                          Most Recent A1c: 8% on 5/16/2019  (Goal: 8%)     Recent Readings 5/27/2019 5/24/2019 5/23/2019 5/22/2019 5/21/2019    Blood Glucose (mg/dL) 164 163 129 152 105          5/28: Reviewed chart. Readings continue to improve. BMP on 5/16: improved Cr and normal K after starting chlorthalidone on 5/3.  No med changes needed today.

## 2019-06-04 ENCOUNTER — PATIENT MESSAGE (OUTPATIENT)
Dept: ENDOCRINOLOGY | Facility: CLINIC | Age: 74
End: 2019-06-04

## 2019-06-04 ENCOUNTER — PATIENT OUTREACH (OUTPATIENT)
Dept: OTHER | Facility: OTHER | Age: 74
End: 2019-06-04

## 2019-06-04 DIAGNOSIS — M81.0 OSTEOPOROSIS WITHOUT CURRENT PATHOLOGICAL FRACTURE, UNSPECIFIED OSTEOPOROSIS TYPE: Primary | ICD-10-CM

## 2019-06-04 RX ORDER — ALENDRONATE SODIUM 70 MG/1
70 TABLET ORAL
Qty: 12 TABLET | Refills: 3 | Status: SHIPPED | OUTPATIENT
Start: 2019-06-04 | End: 2020-05-21 | Stop reason: SDUPTHER

## 2019-06-04 NOTE — TELEPHONE ENCOUNTER
Spoke with Ms. Romero to discuss her BMD results and the recommendation of treatment for osteoporosis.  Discussed all treatment options, MOA and SE.  Patient opted for Fosamax.  Recent CMP and Vit D completed. In depth discussion about side effects including GI upset, MSK pain, ONJ and association with rare subtrochanteric fractures reviewed.  Advised to see the dentist regularly, notify dentist of using this medication and avoid non-emergent dental implants and extractions.  Also advise to contact us if develops new, persistent thigh or groin pain.  Patient verbalized understanding of the above and will follow up in 1 year.

## 2019-06-04 NOTE — PROGRESS NOTES
Last 5 Patient Entered Readings                                      Current 30 Day Average: 143/87     Recent Readings 6/3/2019 6/1/2019 5/31/2019 5/29/2019 5/27/2019    SBP (mmHg) 150 146 129 154 133    DBP (mmHg) 80 86 80 102 94    Pulse 92 89 105 101 97          Last 6 Patient Entered Readings                                          Most Recent A1c: 8% on 5/16/2019  (Goal: 8%)     Recent Readings 6/3/2019 6/1/2019 5/29/2019 5/27/2019 5/24/2019    Blood Glucose (mg/dL) 61 256 200 164 163        6/4: Patient states she is feeling fine.  Reports she only had celery and chicken salad yesterday.  Reports she did not have low blood sugar symptoms.  Reviewed rule of 15.  Will follow up in 3 weeks.

## 2019-06-11 ENCOUNTER — TELEPHONE (OUTPATIENT)
Dept: PODIATRY | Facility: CLINIC | Age: 74
End: 2019-06-11

## 2019-06-11 ENCOUNTER — TELEPHONE (OUTPATIENT)
Dept: PLASTIC SURGERY | Facility: CLINIC | Age: 74
End: 2019-06-11

## 2019-06-11 ENCOUNTER — PATIENT OUTREACH (OUTPATIENT)
Dept: OTHER | Facility: OTHER | Age: 74
End: 2019-06-11

## 2019-06-11 DIAGNOSIS — I50.30 HYPERTENSIVE HEART DISEASE WITH DIASTOLIC HEART FAILURE: ICD-10-CM

## 2019-06-11 DIAGNOSIS — I11.0 HYPERTENSIVE HEART DISEASE WITH DIASTOLIC HEART FAILURE: ICD-10-CM

## 2019-06-11 DIAGNOSIS — E11.8 TYPE 2 DIABETES MELLITUS WITH COMPLICATION, WITHOUT LONG-TERM CURRENT USE OF INSULIN: Primary | ICD-10-CM

## 2019-06-11 NOTE — TELEPHONE ENCOUNTER
Fax information to [employer] Patient Notes and Diabetic shoe Rx.            ----- Message from Padma Angulo sent at 6/11/2019  3:01 PM CDT -----  Contact: Abbey ( [employer] [facility] )  Name of Who is Calling: Abbey ( "[employer] )       What is the request in detail: Patient is requesting a call from staff patient needs diabetic shoes and medical necessity form faxed to insurance she states she would like for her shoes to come from the provider [employer] store.....Please contact to further discuss and advise.     Can the clinic reply by [facility]: no     What Number to Call Back if not in [facility]: [phone]

## 2019-06-11 NOTE — TELEPHONE ENCOUNTER
Spoke with patient let patient know information was fax to [employer]. Patient communicated understanding.          ----- Message from Leroy Hinojosa sent at 6/11/2019  4:17 PM CDT -----  Contact: GERARDO HOLM [[id number]]  Type:  Patient Returning Call    Who Called: Oneida Greer MA      Who Left Message for Patient: Oneida Greer MA      Does the patient know what this is regarding?Diabetic shoes    Best Call Back Number:[phone]    Additional Information:

## 2019-06-11 NOTE — PROGRESS NOTES
"Last 5 Patient Entered Readings                                      Current 30 Day Average: 145/87     Recent Readings 6/7/2019 6/3/2019 6/1/2019 5/31/2019 5/29/2019    SBP (mmHg) 160 150 146 129 154    DBP (mmHg) 86 80 86 80 102    Pulse 91 92 89 105 101        6/11: Spoke to patient. She c/o "mental confusion", worse since death of close friend (Gissell Thomas).  She c/o nausea, constipation and lack of appetite.  States that she has to take too many pills so delays them or just does not take them if it is the end of the day.      I reviewed her med list and see that the NP who did her Medicare Wellness Visit on 5/16 noted that patient was taking 20 mg of amlodipine.  I asked the pt about this and she has noted that she is to take 2 of the Amlodipine tablets.  When I looked back through her chart, I increased the Amlodipine 5 mg to 10 mg on 2/19 and told her to take 2 of the Amlodipine 5 mg tablets to equal 10 mg.   When she saw her PCP on 3/28, she prescribed the 10 mg dose of Amlodipine.  PCP's notes state: dose increased, cancel 5 mg dose now.  Pt apparently did not read the new bottle stating taking 1 (one) tablet daily.      I sent a message to the NP asking her to take action on medication discrepancies.  I am routing this note to PCP Dr. Roberts.  Pt had CMP x 2 since 3/28 visit.  She has not been hypotensive.  The most likely effect from the excess CCB is constipation and GI distress.  Hopefully this will improve with return to prescribed dose of Amlodipine.      [place] so I will contact her again after that.  May order combo valsartan + amlodipine to lessen the pill burden.       Last 6 Patient Entered Readings                                          Most Recent A1c: 8% on 5/16/2019  (Goal: 8%)     Recent Readings 6/7/2019 6/3/2019 6/1/2019 5/29/2019 5/27/2019    Blood Glucose (mg/dL) 200 61 256 200 164        Pt may not be taking glipizide as prescribed and likely not " eating well.

## 2019-06-11 NOTE — TELEPHONE ENCOUNTER
I left a message for the patient to return my call about diabetic shoes.            ----- Message from Shara Peraza sent at 6/11/2019  3:02 PM CDT -----  Contact: GERARDO HOLM [[id number]]  Name of Who is Calling: GERARDO HOLM [[id number]]      What is the request in detail: Patient would like to discuss prescription for diabetic shoes. Please call     Can the clinic reply by [facility]: no    What Number to [facility] Back [facility] not [facility]: [phone]

## 2019-06-25 ENCOUNTER — PATIENT OUTREACH (OUTPATIENT)
Dept: OTHER | Facility: OTHER | Age: 74
End: 2019-06-25

## 2019-06-25 NOTE — PROGRESS NOTES
Last 5 Patient Entered Readings                                      Current 30 Day Average: 141/87     Recent Readings 6/14/2019 6/13/2019 6/12/2019 6/7/2019 6/3/2019    SBP (mmHg) 144 120 136 160 150    DBP (mmHg) 75 80 96 86 80    Pulse 105 80 106 91 92          Last 6 Patient Entered Readings                                          Most Recent A1c: 8% on 5/16/2019  (Goal: 8%)     Recent Readings 6/14/2019 6/12/2019 6/7/2019 6/3/2019 6/1/2019    Blood Glucose (mg/dL) 169 87 200 61 256          6/25: Patient's  states she is sleeping.  Will call in 1 week.

## 2019-06-27 ENCOUNTER — OFFICE VISIT (OUTPATIENT)
Dept: OPTOMETRY | Facility: CLINIC | Age: 74
End: 2019-06-27
Payer: MEDICARE

## 2019-06-27 DIAGNOSIS — H52.4 PRESBYOPIA: ICD-10-CM

## 2019-06-27 DIAGNOSIS — H25.13 NS (NUCLEAR SCLEROSIS), BILATERAL: Primary | ICD-10-CM

## 2019-06-27 DIAGNOSIS — H52.03 HYPEROPIA, BILATERAL: ICD-10-CM

## 2019-06-27 DIAGNOSIS — H18.413 ARCUS SENILIS OF BOTH CORNEAS: ICD-10-CM

## 2019-06-27 PROCEDURE — 92012 INTRM OPH EXAM EST PATIENT: CPT | Mod: S$GLB,,, | Performed by: OPTOMETRIST

## 2019-06-27 PROCEDURE — 92015 PR REFRACTION: ICD-10-PCS | Mod: S$GLB,,, | Performed by: OPTOMETRIST

## 2019-06-27 PROCEDURE — 99999 PR PBB SHADOW E&M-EST. PATIENT-LVL III: ICD-10-PCS | Mod: PBBFAC,,, | Performed by: OPTOMETRIST

## 2019-06-27 PROCEDURE — 92012 PR EYE EXAM, EST PATIENT,INTERMED: ICD-10-PCS | Mod: S$GLB,,, | Performed by: OPTOMETRIST

## 2019-06-27 PROCEDURE — 92015 DETERMINE REFRACTIVE STATE: CPT | Mod: S$GLB,,, | Performed by: OPTOMETRIST

## 2019-06-27 PROCEDURE — 99999 PR PBB SHADOW E&M-EST. PATIENT-LVL III: CPT | Mod: PBBFAC,,, | Performed by: OPTOMETRIST

## 2019-06-27 RX ORDER — CHOLECALCIFEROL (VITAMIN D3) 25 MCG
1000 TABLET ORAL DAILY
Status: ON HOLD | COMMUNITY
End: 2019-07-12 | Stop reason: HOSPADM

## 2019-06-27 RX ORDER — POTASSIUM CHLORIDE 750 MG/1
10 CAPSULE, EXTENDED RELEASE ORAL DAILY
COMMUNITY
End: 2019-08-05

## 2019-06-27 NOTE — PROGRESS NOTES
HPI     Last eye exam was 12/3/18 with Dr Pelayo.    Pt states having problems looking far away with glasses on.  Not as much problem up close.   Patient denies diplopia, headaches, flashes/floaters, pain, and   itching/burning/tearing.    Using any eye gtts? No    Hemoglobin A1C       Date                     Value               Ref Range             Status                05/16/2019               8.0 (H)             4.0 - 5.6 %           Final                     04/11/2019               8.7 (H)             4.0 - 5.6 %           Final                     11/08/2018               9.8 (H)             4.0 - 5.6 %           Final                  Last edited by Rachel Mann on 6/27/2019  1:36 PM. (History)            Assessment /Plan     For exam results, see Encounter Report.    NS (nuclear sclerosis), bilateral    Arcus senilis of both corneas    Hyperopia, bilateral    Presbyopia         Arcus senilis, bilateral  Hyperopia, bilateral  Presbyopia              Rx specs, PAL with AR    RTC 1 dec for annual? DFE

## 2019-07-03 NOTE — PROGRESS NOTES
"Last 5 Patient Entered Readings                                      Current 30 Day Average: 143/85     Recent Readings 6/30/2019 6/14/2019 6/13/2019 6/12/2019 6/7/2019    SBP (mmHg) 149 144 120 136 160    DBP (mmHg) 94 75 80 96 86    Pulse 90 105 80 106 91          Last 6 Patient Entered Readings                                          Most Recent A1c: 8% on 5/16/2019  (Goal: 8%)     Recent Readings 6/30/2019 6/14/2019 6/12/2019 6/7/2019 6/3/2019    Blood Glucose (mg/dL) 145 169 87 200 61        Reports she lost her phone and now has it synced up.  States needs to get prepared "mentally" before taking readings.  Encouraged patient to start fresh on Monday after the holiday weekend.    Digital Medicine: Health  Follow Up    Lifestyle Modifications:    1.Dietary Modifications (Sodium intake <2,000mg/day, food labels, dining out): Reports no change in PA    2.Physical Activity: Reports no change in activity.    3.Medication Therapy: Patient has been compliant with the medication regimen.    4.Patient has the following medication side effects/concerns: none  (Frequency/Alleviating factors/Precipitating factors, etc.)     Follow up with Mrs. Kaylee Urena Nick completed. No further questions or concerns. Will continue to follow up to achieve health goals.  "

## 2019-07-08 PROBLEM — E66.9 DIABETES MELLITUS TYPE 2 IN OBESE: Status: RESOLVED | Noted: 2019-05-16 | Resolved: 2019-07-08

## 2019-07-08 PROBLEM — I21.4 NSTEMI (NON-ST ELEVATED MYOCARDIAL INFARCTION): Status: ACTIVE | Noted: 2019-07-08

## 2019-07-08 PROBLEM — E11.69 DIABETES MELLITUS TYPE 2 IN OBESE: Status: RESOLVED | Noted: 2019-05-16 | Resolved: 2019-07-08

## 2019-07-08 PROBLEM — I49.9 ARRHYTHMIA: Status: RESOLVED | Noted: 2017-04-20 | Resolved: 2019-07-08

## 2019-07-08 PROBLEM — Z96.652 STATUS POST TOTAL LEFT KNEE REPLACEMENT: Status: RESOLVED | Noted: 2017-05-16 | Resolved: 2019-07-08

## 2019-07-08 NOTE — PLAN OF CARE
(Physician in Lead of Transfers) /Regional Referral Center Note    Patients name/MRN: Kaylee Romero/MRN [id number]     Referring Physician or Mid-Level provider giving report: Dr. Narda Crane (Hospital Medicine)  Contact number: [phone]    Referral Facility: [facility]     Date/Time of Acceptance: 7/8/2019 4:57 PM    : Ade Rollins MD; Case discussed with Nikhil Jewell NP who is accepting for Hospital Medicine at [facility]    Accepting facility: [facility] Med/Surg Telemetry    Consulting Physicians from [facility] involved in case:  Dr. Ray Arzola ([facility]-Cardiology)    Reason for transfer request: NSTEMI and no Cardiology at [facility]     Report from Physician/Mid-Level Provider/HPI: 75 y/o female with hypertensive heart disease with diastolic heart failure, essential HTN, KAMRAN on CPAP, morbid obesity, Type 2 diabetes with CKD stage 3 not on home insulin was admitted to [facility] on 7/7 with chest pain. Patient reported on and off chest pain for past 1 week. Patent admitted, and serial troponins drawn that showed initial 0.03 and second set was 0.33 and third set 0.39. EKG done showed no acute ischemic changes. Patient started on Aspirin 325 mg po daily and Plavix 75 mg po daily. Patient also started on low dose beta blocker. Cardiology consulted at [facility] and recommended LHC but they do not have LHC available at [facility] so request for transfer. Patient is not having any active chest pain. Patient is on Med/Surg telemetry floor. Patient on no cardiac drips. Patient was previously seen by Dr. Pop Henderson in Cardiology clinic at [facility] in 11/2018 with intermittent chest pain and patient underwent outpatient exercise stress test hat was severely limited due to exercise tolerance but showed no signs of ischemia. Patient on no current anticoagulation. Dr. Arzola from [facility]  called and stated he is okay for transfer and will consult on patient.     VS: BP: 126/69  P: 87  R: 16  T: 98.1 F  Pulse Ox: 93% on room air    Past Medical/Surgical History: See EPIC    Meds: See EPIC    Labs: See EPIC    Imaging:   Echocardiogram Exercise Stress test (12/13/2018):   · The patient reported no symptoms during the stress test.  · Arrhythmias during stress: rare PACs.  · Overall, the patient's exercise capacity was severely impaired. Patient refused Dobutamine.  · The EKG portion of this study is negative for myocardial ischemia.  · Normal left ventricular systolic function. The estimated ejection fraction is 65%  · Concentric left ventricular remodeling.  · No wall motion abnormalities.  · Indeterminate left ventricular diastolic function.  · Normal right ventricular systolic function.  · Normal atrial size.  The stress echo portion of this study is negative for myocardial ischemia.    To Do List upon arrival:    · Admit to Med/Surg telemetry for NSTEMI  · Consult Cardiology for evaluation for LHC  · Treatment for NSTEMI    Ade Rollins MD  Senior Staff Physician  Department of Hospital Medicine  Patient Flow Center/   [phone]

## 2019-07-09 ENCOUNTER — CLINICAL SUPPORT (OUTPATIENT)
Dept: CARDIOLOGY | Facility: CLINIC | Age: 74
DRG: 281 | End: 2019-07-09
Attending: HOSPITALIST
Payer: MEDICARE

## 2019-07-09 ENCOUNTER — HOSPITAL ENCOUNTER (INPATIENT)
Facility: HOSPITAL | Age: 74
LOS: 3 days | Discharge: HOME | DRG: 281 | End: 2019-07-12
Attending: HOSPITALIST | Admitting: HOSPITALIST
Payer: MEDICARE

## 2019-07-09 VITALS
DIASTOLIC BLOOD PRESSURE: 95 MMHG | HEART RATE: 74 BPM | HEIGHT: 62 IN | BODY MASS INDEX: 42.33 KG/M2 | WEIGHT: 230 LBS | SYSTOLIC BLOOD PRESSURE: 167 MMHG

## 2019-07-09 DIAGNOSIS — I11.0 HYPERTENSIVE HEART DISEASE WITH DIASTOLIC HEART FAILURE: ICD-10-CM

## 2019-07-09 DIAGNOSIS — I50.30 HYPERTENSIVE HEART DISEASE WITH DIASTOLIC HEART FAILURE: ICD-10-CM

## 2019-07-09 DIAGNOSIS — E11.8 TYPE 2 DIABETES MELLITUS WITH COMPLICATION, WITHOUT LONG-TERM CURRENT USE OF INSULIN: ICD-10-CM

## 2019-07-09 DIAGNOSIS — I21.4 NON-ST ELEVATION MYOCARDIAL INFARCTION (NSTEMI): ICD-10-CM

## 2019-07-09 DIAGNOSIS — Z86.73 HISTORY OF TIA (TRANSIENT ISCHEMIC ATTACK): ICD-10-CM

## 2019-07-09 DIAGNOSIS — G47.33 OSA ON CPAP: ICD-10-CM

## 2019-07-09 DIAGNOSIS — I21.4 NSTEMI (NON-ST ELEVATION MYOCARDIAL INFARCTION): ICD-10-CM

## 2019-07-09 DIAGNOSIS — E66.01 MORBID OBESITY WITH BODY MASS INDEX (BMI) OF 40.0 OR HIGHER: ICD-10-CM

## 2019-07-09 DIAGNOSIS — I21.4 NSTEMI (NON-ST ELEVATED MYOCARDIAL INFARCTION): Primary | ICD-10-CM

## 2019-07-09 DIAGNOSIS — E78.2 MIXED HYPERLIPIDEMIA: ICD-10-CM

## 2019-07-09 DIAGNOSIS — R53.81 PHYSICAL DECONDITIONING: ICD-10-CM

## 2019-07-09 DIAGNOSIS — E11.22 TYPE 2 DIABETES MELLITUS WITH STAGE 3 CHRONIC KIDNEY DISEASE, WITHOUT LONG-TERM CURRENT USE OF INSULIN: ICD-10-CM

## 2019-07-09 DIAGNOSIS — N18.30 CKD (CHRONIC KIDNEY DISEASE) STAGE 3, GFR 30-59 ML/MIN: ICD-10-CM

## 2019-07-09 DIAGNOSIS — N18.30 TYPE 2 DIABETES MELLITUS WITH STAGE 3 CHRONIC KIDNEY DISEASE, WITHOUT LONG-TERM CURRENT USE OF INSULIN: ICD-10-CM

## 2019-07-09 LAB
25(OH)D3+25(OH)D2 SERPL-MCNC: 51 NG/ML (ref 30–96)
ABO + RH BLD: NORMAL
ALBUMIN SERPL BCP-MCNC: 3 G/DL (ref 3.5–5.2)
ALP SERPL-CCNC: 83 U/L (ref 55–135)
ALT SERPL W/O P-5'-P-CCNC: 24 U/L (ref 10–44)
ANION GAP SERPL CALC-SCNC: 10 MMOL/L (ref 8–16)
APTT BLDCRRT: 22.4 SEC (ref 21–32)
APTT BLDCRRT: 34.4 SEC (ref 21–32)
ASCENDING AORTA: 2.9 CM
AST SERPL-CCNC: 23 U/L (ref 10–40)
AV INDEX (PROSTH): 0.87
AV MEAN GRADIENT: 2 MMHG
AV PEAK GRADIENT: 4 MMHG
AV VALVE AREA: 3.32 CM2
AV VELOCITY RATIO: 0.85
BASOPHILS # BLD AUTO: 0.03 K/UL (ref 0–0.2)
BASOPHILS NFR BLD: 0.5 % (ref 0–1.9)
BILIRUB SERPL-MCNC: 0.3 MG/DL (ref 0.1–1)
BLD GP AB SCN CELLS X3 SERPL QL: NORMAL
BNP SERPL-MCNC: 38 PG/ML (ref 0–99)
BSA FOR ECHO PROCEDURE: 2.14 M2
BUN SERPL-MCNC: 16 MG/DL (ref 8–23)
CALCIUM SERPL-MCNC: 8.8 MG/DL (ref 8.7–10.5)
CHLORIDE SERPL-SCNC: 103 MMOL/L (ref 95–110)
CHOLEST SERPL-MCNC: 134 MG/DL (ref 120–199)
CHOLEST/HDLC SERPL: 3.1 {RATIO} (ref 2–5)
CO2 SERPL-SCNC: 24 MMOL/L (ref 23–29)
CREAT SERPL-MCNC: 1.1 MG/DL (ref 0.5–1.4)
CV ECHO LV RWT: 0.44 CM
DIFFERENTIAL METHOD: ABNORMAL
DOP CALC AO PEAK VEL: 0.95 M/S
DOP CALC AO VTI: 18.82 CM
DOP CALC LVOT AREA: 3.8 CM2
DOP CALC LVOT DIAMETER: 2.2 CM
DOP CALC LVOT PEAK VEL: 0.81 M/S
DOP CALC LVOT STROKE VOLUME: 62.54 CM3
DOP CALCLVOT PEAK VEL VTI: 16.46 CM
E WAVE DECELERATION TIME: 264.99 MSEC
E/A RATIO: 0.67
E/E' RATIO: 13 M/S
ECHO LV POSTERIOR WALL: 0.9 CM (ref 0.6–1.1)
EOSINOPHIL # BLD AUTO: 0.2 K/UL (ref 0–0.5)
EOSINOPHIL NFR BLD: 2.8 % (ref 0–8)
ERYTHROCYTE [DISTWIDTH] IN BLOOD BY AUTOMATED COUNT: 13.4 % (ref 11.5–14.5)
EST. GFR  (AFRICAN AMERICAN): 58 ML/MIN/1.73 M^2
EST. GFR  (NON AFRICAN AMERICAN): 50.3 ML/MIN/1.73 M^2
ESTIMATED AVG GLUCOSE: 171 MG/DL (ref 68–131)
FRACTIONAL SHORTENING: 27 % (ref 28–44)
GLUCOSE SERPL-MCNC: 143 MG/DL (ref 70–110)
HBA1C MFR BLD HPLC: 7.6 % (ref 4–5.6)
HCT VFR BLD AUTO: 34.8 % (ref 37–48.5)
HDLC SERPL-MCNC: 43 MG/DL (ref 40–75)
HDLC SERPL: 32.1 % (ref 20–50)
HGB BLD-MCNC: 11 G/DL (ref 12–16)
IMM GRANULOCYTES # BLD AUTO: 0.04 K/UL (ref 0–0.04)
IMM GRANULOCYTES NFR BLD AUTO: 0.7 % (ref 0–0.5)
INR PPP: 1 (ref 0.8–1.2)
INTERVENTRICULAR SEPTUM: 0.79 CM (ref 0.6–1.1)
IVRT: 0.03 MSEC
LA MAJOR: 4.76 CM
LA WIDTH: 3.77 CM
LDLC SERPL CALC-MCNC: 70.8 MG/DL (ref 63–159)
LEFT ATRIUM SIZE: 4.05 CM
LEFT INTERNAL DIMENSION IN SYSTOLE: 2.96 CM (ref 2.1–4)
LEFT VENTRICLE DIASTOLIC VOLUME INDEX: 35.65 ML/M2
LEFT VENTRICLE DIASTOLIC VOLUME: 72.32 ML
LEFT VENTRICLE MASS INDEX: 51 G/M2
LEFT VENTRICLE SYSTOLIC VOLUME INDEX: 16.7 ML/M2
LEFT VENTRICLE SYSTOLIC VOLUME: 33.9 ML
LEFT VENTRICULAR INTERNAL DIMENSION IN DIASTOLE: 4.06 CM (ref 3.5–6)
LEFT VENTRICULAR MASS: 103.09 G
LV LATERAL E/E' RATIO: 13 M/S
LV SEPTAL E/E' RATIO: 13 M/S
LYMPHOCYTES # BLD AUTO: 1.6 K/UL (ref 1–4.8)
LYMPHOCYTES NFR BLD: 28.4 % (ref 18–48)
MCH RBC QN AUTO: 27.2 PG (ref 27–31)
MCHC RBC AUTO-ENTMCNC: 31.6 G/DL (ref 32–36)
MCV RBC AUTO: 86 FL (ref 82–98)
MONOCYTES # BLD AUTO: 0.4 K/UL (ref 0.3–1)
MONOCYTES NFR BLD: 7.2 % (ref 4–15)
MV PEAK A VEL: 0.97 M/S
MV PEAK E VEL: 0.65 M/S
NEUTROPHILS # BLD AUTO: 3.4 K/UL (ref 1.8–7.7)
NEUTROPHILS NFR BLD: 60.4 % (ref 38–73)
NONHDLC SERPL-MCNC: 91 MG/DL
NRBC BLD-RTO: 0 /100 WBC
PLATELET # BLD AUTO: 230 K/UL (ref 150–350)
PMV BLD AUTO: 9.8 FL (ref 9.2–12.9)
POCT GLUCOSE: 109 MG/DL (ref 70–110)
POCT GLUCOSE: 165 MG/DL (ref 70–110)
POCT GLUCOSE: 174 MG/DL (ref 70–110)
POCT GLUCOSE: 224 MG/DL (ref 70–110)
POTASSIUM SERPL-SCNC: 4.2 MMOL/L (ref 3.5–5.1)
PROT SERPL-MCNC: 7.8 G/DL (ref 6–8.4)
PROTHROMBIN TIME: 10 SEC (ref 9–12.5)
RBC # BLD AUTO: 4.05 M/UL (ref 4–5.4)
SINUS: 2.78 CM
SODIUM SERPL-SCNC: 137 MMOL/L (ref 136–145)
STJ: 2.43 CM
TDI LATERAL: 0.05 M/S
TDI SEPTAL: 0.05 M/S
TDI: 0.05 M/S
TRICUSPID ANNULAR PLANE SYSTOLIC EXCURSION: 1.67 CM
TRIGL SERPL-MCNC: 101 MG/DL (ref 30–150)
TROPONIN I SERPL DL<=0.01 NG/ML-MCNC: 0.27 NG/ML (ref 0–0.03)
TROPONIN I SERPL DL<=0.01 NG/ML-MCNC: 0.29 NG/ML (ref 0–0.03)
WBC # BLD AUTO: 5.67 K/UL (ref 3.9–12.7)

## 2019-07-09 PROCEDURE — 93454 CORONARY ARTERY ANGIO S&I: CPT | Mod: 26,,, | Performed by: INTERNAL MEDICINE

## 2019-07-09 PROCEDURE — 25000003 PHARM REV CODE 250: Performed by: INTERNAL MEDICINE

## 2019-07-09 PROCEDURE — 99152 MOD SED SAME PHYS/QHP 5/>YRS: CPT | Mod: ,,, | Performed by: INTERNAL MEDICINE

## 2019-07-09 PROCEDURE — 93010 EKG 12-LEAD: ICD-10-PCS | Mod: ,,, | Performed by: INTERNAL MEDICINE

## 2019-07-09 PROCEDURE — C1894 INTRO/SHEATH, NON-LASER: HCPCS | Performed by: INTERNAL MEDICINE

## 2019-07-09 PROCEDURE — 93306 TRANSTHORACIC ECHO (TTE) COMPLETE (CUPID ONLY): ICD-10-PCS | Mod: 26,,, | Performed by: INTERNAL MEDICINE

## 2019-07-09 PROCEDURE — 20600001 HC STEP DOWN PRIVATE ROOM

## 2019-07-09 PROCEDURE — C1769 GUIDE WIRE: HCPCS | Performed by: INTERNAL MEDICINE

## 2019-07-09 PROCEDURE — 93454 CORONARY ARTERY ANGIO S&I: CPT | Performed by: INTERNAL MEDICINE

## 2019-07-09 PROCEDURE — 84484 ASSAY OF TROPONIN QUANT: CPT

## 2019-07-09 PROCEDURE — 83036 HEMOGLOBIN GLYCOSYLATED A1C: CPT

## 2019-07-09 PROCEDURE — 85730 THROMBOPLASTIN TIME PARTIAL: CPT

## 2019-07-09 PROCEDURE — 99223 1ST HOSP IP/OBS HIGH 75: CPT | Mod: ,,, | Performed by: HOSPITALIST

## 2019-07-09 PROCEDURE — 25000003 PHARM REV CODE 250: Performed by: HOSPITALIST

## 2019-07-09 PROCEDURE — 93454 PR CATH PLACE/CORONARY ANGIO, IMG SUPER/INTERP: ICD-10-PCS | Mod: 26,,, | Performed by: INTERNAL MEDICINE

## 2019-07-09 PROCEDURE — 80053 COMPREHEN METABOLIC PANEL: CPT

## 2019-07-09 PROCEDURE — 85730 THROMBOPLASTIN TIME PARTIAL: CPT | Mod: 91

## 2019-07-09 PROCEDURE — 86850 RBC ANTIBODY SCREEN: CPT

## 2019-07-09 PROCEDURE — 99900035 HC TECH TIME PER 15 MIN (STAT)

## 2019-07-09 PROCEDURE — 25000003 PHARM REV CODE 250: Performed by: STUDENT IN AN ORGANIZED HEALTH CARE EDUCATION/TRAINING PROGRAM

## 2019-07-09 PROCEDURE — 36415 COLL VENOUS BLD VENIPUNCTURE: CPT

## 2019-07-09 PROCEDURE — 82306 VITAMIN D 25 HYDROXY: CPT

## 2019-07-09 PROCEDURE — 80061 LIPID PANEL: CPT

## 2019-07-09 PROCEDURE — 99999 PR PBB SHADOW E&M-EST. PATIENT-LVL II: ICD-10-PCS | Mod: PBBFAC,,,

## 2019-07-09 PROCEDURE — 85610 PROTHROMBIN TIME: CPT

## 2019-07-09 PROCEDURE — 85025 COMPLETE CBC W/AUTO DIFF WBC: CPT | Mod: 91

## 2019-07-09 PROCEDURE — 99223 PR INITIAL HOSPITAL CARE,LEVL III: ICD-10-PCS | Mod: ,,, | Performed by: HOSPITALIST

## 2019-07-09 PROCEDURE — 25500020 PHARM REV CODE 255: Performed by: INTERNAL MEDICINE

## 2019-07-09 PROCEDURE — 93010 ELECTROCARDIOGRAM REPORT: CPT | Mod: ,,, | Performed by: INTERNAL MEDICINE

## 2019-07-09 PROCEDURE — 63600175 PHARM REV CODE 636 W HCPCS: Performed by: INTERNAL MEDICINE

## 2019-07-09 PROCEDURE — 63600175 PHARM REV CODE 636 W HCPCS: Performed by: HOSPITALIST

## 2019-07-09 PROCEDURE — 25000003 PHARM REV CODE 250: Performed by: NURSE PRACTITIONER

## 2019-07-09 PROCEDURE — 93306 TTE W/DOPPLER COMPLETE: CPT

## 2019-07-09 PROCEDURE — 99152 MOD SED SAME PHYS/QHP 5/>YRS: CPT | Performed by: INTERNAL MEDICINE

## 2019-07-09 PROCEDURE — 93306 TTE W/DOPPLER COMPLETE: CPT | Mod: 26,,, | Performed by: INTERNAL MEDICINE

## 2019-07-09 PROCEDURE — 99999 PR PBB SHADOW E&M-EST. PATIENT-LVL II: CPT | Mod: PBBFAC,,,

## 2019-07-09 PROCEDURE — 83880 ASSAY OF NATRIURETIC PEPTIDE: CPT

## 2019-07-09 PROCEDURE — C1887 CATHETER, GUIDING: HCPCS | Performed by: INTERNAL MEDICINE

## 2019-07-09 PROCEDURE — 93005 ELECTROCARDIOGRAM TRACING: CPT

## 2019-07-09 PROCEDURE — 99152 PR MOD CONSCIOUS SEDATION, SAME PHYS, 5+ YRS, FIRST 15 MIN: ICD-10-PCS | Mod: ,,, | Performed by: INTERNAL MEDICINE

## 2019-07-09 RX ORDER — SODIUM CHLORIDE 9 MG/ML
INJECTION, SOLUTION INTRAVENOUS CONTINUOUS
Status: ACTIVE | OUTPATIENT
Start: 2019-07-09 | End: 2019-07-09

## 2019-07-09 RX ORDER — GLUCAGON 1 MG
1 KIT INJECTION
Status: DISCONTINUED | OUTPATIENT
Start: 2019-07-09 | End: 2019-07-12 | Stop reason: HOSPADM

## 2019-07-09 RX ORDER — NITROGLYCERIN 5 MG/ML
INJECTION, SOLUTION INTRAVENOUS
Status: DISCONTINUED | OUTPATIENT
Start: 2019-07-09 | End: 2019-07-09 | Stop reason: HOSPADM

## 2019-07-09 RX ORDER — VERAPAMIL HYDROCHLORIDE 2.5 MG/ML
INJECTION, SOLUTION INTRAVENOUS
Status: DISCONTINUED | OUTPATIENT
Start: 2019-07-09 | End: 2019-07-09 | Stop reason: HOSPADM

## 2019-07-09 RX ORDER — MULTIVITAMIN
1 TABLET ORAL DAILY
COMMUNITY

## 2019-07-09 RX ORDER — HEPARIN SODIUM,PORCINE/D5W 25000/250
12 INTRAVENOUS SOLUTION INTRAVENOUS CONTINUOUS
Status: DISCONTINUED | OUTPATIENT
Start: 2019-07-09 | End: 2019-07-11

## 2019-07-09 RX ORDER — NITROGLYCERIN 0.4 MG/1
0.4 TABLET SUBLINGUAL EVERY 5 MIN PRN
Status: DISCONTINUED | OUTPATIENT
Start: 2019-07-09 | End: 2019-07-12 | Stop reason: HOSPADM

## 2019-07-09 RX ORDER — METOPROLOL TARTRATE 25 MG/1
25 TABLET, FILM COATED ORAL 2 TIMES DAILY
Status: DISCONTINUED | OUTPATIENT
Start: 2019-07-09 | End: 2019-07-10

## 2019-07-09 RX ORDER — SODIUM CHLORIDE 9 MG/ML
INJECTION, SOLUTION INTRAVENOUS ONCE
Status: COMPLETED | OUTPATIENT
Start: 2019-07-09 | End: 2019-07-09

## 2019-07-09 RX ORDER — FENTANYL CITRATE 50 UG/ML
INJECTION, SOLUTION INTRAMUSCULAR; INTRAVENOUS
Status: DISCONTINUED | OUTPATIENT
Start: 2019-07-09 | End: 2019-07-09 | Stop reason: HOSPADM

## 2019-07-09 RX ORDER — CHOLECALCIFEROL (VITAMIN D3) 25 MCG
1000 TABLET ORAL DAILY
Status: DISCONTINUED | OUTPATIENT
Start: 2019-07-09 | End: 2019-07-11

## 2019-07-09 RX ORDER — MIDAZOLAM HYDROCHLORIDE 1 MG/ML
INJECTION, SOLUTION INTRAMUSCULAR; INTRAVENOUS
Status: DISCONTINUED | OUTPATIENT
Start: 2019-07-09 | End: 2019-07-09 | Stop reason: HOSPADM

## 2019-07-09 RX ORDER — HEPARIN SODIUM 200 [USP'U]/100ML
INJECTION, SOLUTION INTRAVENOUS
Status: DISCONTINUED | OUTPATIENT
Start: 2019-07-09 | End: 2019-07-10

## 2019-07-09 RX ORDER — AMLODIPINE BESYLATE 10 MG/1
10 TABLET ORAL DAILY
Status: DISCONTINUED | OUTPATIENT
Start: 2019-07-09 | End: 2019-07-11

## 2019-07-09 RX ORDER — LIDOCAINE HYDROCHLORIDE 20 MG/ML
INJECTION, SOLUTION EPIDURAL; INFILTRATION; INTRACAUDAL; PERINEURAL
Status: DISCONTINUED | OUTPATIENT
Start: 2019-07-09 | End: 2019-07-09 | Stop reason: HOSPADM

## 2019-07-09 RX ORDER — HEPARIN SODIUM 1000 [USP'U]/ML
INJECTION, SOLUTION INTRAVENOUS; SUBCUTANEOUS
Status: DISCONTINUED | OUTPATIENT
Start: 2019-07-09 | End: 2019-07-09 | Stop reason: HOSPADM

## 2019-07-09 RX ORDER — ATORVASTATIN CALCIUM 20 MG/1
80 TABLET, FILM COATED ORAL DAILY
Status: DISCONTINUED | OUTPATIENT
Start: 2019-07-09 | End: 2019-07-12 | Stop reason: HOSPADM

## 2019-07-09 RX ORDER — DIPHENHYDRAMINE HCL 50 MG
50 CAPSULE ORAL ONCE
Status: COMPLETED | OUTPATIENT
Start: 2019-07-09 | End: 2019-07-09

## 2019-07-09 RX ORDER — SIMVASTATIN 40 MG/1
40 TABLET, FILM COATED ORAL NIGHTLY
Status: DISCONTINUED | OUTPATIENT
Start: 2019-07-09 | End: 2019-07-09

## 2019-07-09 RX ORDER — LOSARTAN POTASSIUM 50 MG/1
100 TABLET ORAL DAILY
Status: DISCONTINUED | OUTPATIENT
Start: 2019-07-09 | End: 2019-07-09

## 2019-07-09 RX ORDER — CHLORTHALIDONE 25 MG/1
25 TABLET ORAL DAILY
Status: DISCONTINUED | OUTPATIENT
Start: 2019-07-09 | End: 2019-07-12 | Stop reason: HOSPADM

## 2019-07-09 RX ORDER — HEPARIN SODIUM,PORCINE/D5W 25000/250
12 INTRAVENOUS SOLUTION INTRAVENOUS CONTINUOUS
Status: DISCONTINUED | OUTPATIENT
Start: 2019-07-09 | End: 2019-07-09

## 2019-07-09 RX ORDER — SODIUM CHLORIDE 0.9 % (FLUSH) 0.9 %
10 SYRINGE (ML) INJECTION
Status: DISCONTINUED | OUTPATIENT
Start: 2019-07-09 | End: 2019-07-12 | Stop reason: HOSPADM

## 2019-07-09 RX ORDER — INSULIN ASPART 100 [IU]/ML
0-5 INJECTION, SOLUTION INTRAVENOUS; SUBCUTANEOUS EVERY 6 HOURS PRN
Status: DISCONTINUED | OUTPATIENT
Start: 2019-07-09 | End: 2019-07-11

## 2019-07-09 RX ORDER — ASPIRIN 81 MG/1
81 TABLET ORAL DAILY
Status: DISCONTINUED | OUTPATIENT
Start: 2019-07-09 | End: 2019-07-12 | Stop reason: HOSPADM

## 2019-07-09 RX ORDER — CANDESARTAN 16 MG/1
16 TABLET ORAL 2 TIMES DAILY
Status: DISCONTINUED | OUTPATIENT
Start: 2019-07-09 | End: 2019-07-10

## 2019-07-09 RX ADMIN — CHLORTHALIDONE 25 MG: 25 TABLET ORAL at 09:07

## 2019-07-09 RX ADMIN — TICAGRELOR 180 MG: 90 TABLET ORAL at 05:07

## 2019-07-09 RX ADMIN — HEPARIN SODIUM AND DEXTROSE 14 UNITS/KG/HR: 10000; 5 INJECTION INTRAVENOUS at 02:07

## 2019-07-09 RX ADMIN — METOPROLOL TARTRATE 25 MG: 25 TABLET ORAL at 09:07

## 2019-07-09 RX ADMIN — AMLODIPINE BESYLATE 10 MG: 10 TABLET ORAL at 09:07

## 2019-07-09 RX ADMIN — ASPIRIN 81 MG: 81 TABLET, COATED ORAL at 09:07

## 2019-07-09 RX ADMIN — SODIUM CHLORIDE 1000 ML: 0.9 INJECTION, SOLUTION INTRAVENOUS at 02:07

## 2019-07-09 RX ADMIN — SODIUM CHLORIDE: 0.9 INJECTION, SOLUTION INTRAVENOUS at 05:07

## 2019-07-09 RX ADMIN — SIMVASTATIN 40 MG: 40 TABLET, FILM COATED ORAL at 05:07

## 2019-07-09 RX ADMIN — CANDESARTAN CILEXETIL 16 MG: 16 TABLET ORAL at 09:07

## 2019-07-09 RX ADMIN — CANDESARTAN CILEXETIL 16 MG: 16 TABLET ORAL at 08:07

## 2019-07-09 RX ADMIN — HEPARIN SODIUM AND DEXTROSE 12 UNITS/KG/HR: 10000; 5 INJECTION INTRAVENOUS at 05:07

## 2019-07-09 RX ADMIN — VITAMIN D, TAB 1000IU (100/BT) 1000 UNITS: 25 TAB at 02:07

## 2019-07-09 RX ADMIN — METOPROLOL TARTRATE 25 MG: 25 TABLET ORAL at 08:07

## 2019-07-09 RX ADMIN — DIPHENHYDRAMINE HYDROCHLORIDE 50 MG: 50 CAPSULE ORAL at 02:07

## 2019-07-09 RX ADMIN — ATORVASTATIN CALCIUM 80 MG: 20 TABLET, FILM COATED ORAL at 09:07

## 2019-07-09 NOTE — HPI
74F presents as transfer from [facility] for NSTEMI.  She went to the ED there Sunday after experiencing severe left-sided chest pressure radiating to her arm which occurred suddenly before she was about to go to [employer].  She went inside and took a 500mg ASA and it gradually subsided.  She had a similar event several days earlier which similarly resolved but didn't seek care for it then, not thinking that it could be cardiac in origin.  There her initial troponin was 0.03, which went up to 0.3.  She was given ASA and plavix, but no heparin.  There are no cardiology services there so she was transferred here for further management.  Her cardiologist Dr Henderson is here.

## 2019-07-09 NOTE — NURSING TRANSFER
Nursing Transfer Note      7/9/2019     Transfer To: cath lab    Transfer via stretcher    Transfer with 2L to O2, cardiac monitoring    Transported by [facility]    Medicines sent: NS gtt    Chart send with patient: Yes    Notified: spouse notified by patient

## 2019-07-09 NOTE — H&P
[facility] Hospital Medicine  History & Physical    Patient Name: Kaylee Romero  MRN: [id number]  Admission Date: 7/9/2019  Attending Physician: Amy Oconnell MD   Primary Care Provider: Liz Roberts MD    [facility] Medicine Team: Networked reference to record PCT  Wilian Garcia MD     Patient information was obtained from patient and past medical records.     Subjective:     Principal Problem:NSTEMI (non-ST elevated myocardial infarction)    Chief Complaint: No chief complaint on file.       HPI: 74F presents as transfer from [facility] for NSTEMI.  She went to the ED there Sunday after experiencing severe left-sided chest pressure radiating to her arm which occurred suddenly before she was about to go to [facility].  She went inside and took a 500mg ASA and it gradually subsided.  She had a similar event several days earlier which similarly resolved but didn't seek care for it then, not thinking that it could be cardiac in origin.  There her initial troponin was 0.03, which went up to 0.3.  She was given ASA and plavix, but no heparin.  There are no cardiology services there so she was transferred here for further management.  Her cardiologist Dr Henderson is here.      Past Medical History:   Diagnosis Date    Acid reflux     Age-related osteoporosis without current pathological fracture 1/11/2019    Cataract     CKD (chronic kidney disease) stage 3, GFR 30-59 ml/min     Dry mouth     History of TIA (transient ischemic attack) 12/11/2018    Hyperlipidemia 12/2/2014    Hypertensive heart disease with diastolic heart failure 11/28/2012    Morbid obesity with body mass index (BMI) of 40.0 or higher 5/9/2016    KAMRAN (obstructive sleep apnea)     KAMRAN on CPAP 6/28/2016    Osteoporosis without current pathological fracture 11/11/2018    Physical deconditioning 11/5/2018    Status post total left knee replacement 5/1/2017 5/16/2017    Type 2 diabetes mellitus with  stage 3 chronic kidney disease, without long-term current use of insulin 2019       Past Surgical History:   Procedure Laterality Date    ankle surgery (l)      APPENDECTOMY       SECTION      DILATION AND CURETTAGE OF UTERUS      KNEE ARTHROSCOPY W/ DEBRIDEMENT      KNEE SURGERY Left 2017    TKR    REPLACEMENT-KNEE-TOTAL Left 2017    [facility] OR [address] [place] [address]       Review of patient's allergies indicates:   Allergen Reactions    Keflex [cephalexin] Other (See Comments)     Turns orange    Bactrim [sulfamethoxazole-trimethoprim]      Generic version  Sulfa makes her sick       No current facility-administered medications on file prior to encounter.      Current Outpatient Medications on File Prior to Encounter   Medication Sig    multivitamin (ONE DAILY MULTIVITAMIN) per tablet Take 1 tablet by mouth once daily.    alcohol swabs (ALCOHOL PADS) PadM Apply 1 each topically as needed.    alendronate (FOSAMAX) 70 MG tablet Take 1 tablet (70 mg total) by mouth every 7 days. Take with a full glass of water & remain upright for at least 30 minutes    amLODIPine (NORVASC) 10 MG tablet Take 1 tablet (10 mg total) by mouth once daily. For Blood Pressure.    aspirin (ECOTRIN) 81 MG EC tablet Take 1 tablet (81 mg total) by mouth once daily.    blood sugar diagnostic Strp 1 strip by Misc.(Non-Drug; Combo Route) route once daily.    blood-glucose meter kit Use as instructed    chlorthalidone (HYGROTEN) 25 MG Tab Take 1 tablet (25 mg total) by mouth once daily.    glipiZIDE (GLUCOTROL) 10 MG TR24 Take 2 tablets (20 mg total) by mouth daily with breakfast.    lancets Misc 1 lancet by Misc.(Non-Drug; Combo Route) route once daily.    potassium chloride (MICRO-K) 10 MEQ CpSR Take 10 mEq by mouth once.    simvastatin (ZOCOR) 10 MG tablet TAKE 1 TABLET(10 MG) BY MOUTH EVERY EVENING FOR CHOLESTEROL    TRULICITY 1.5 mg/0.5 mL PnIj INJECT 1.5 MG UNDER THE SKIN ONCE  A WEEK    valsartan (DIOVAN) 320 MG tablet Take 1 tablet (320 mg total) by mouth once daily.    vitamin D (VITAMIN D3) 1000 units Tab Take 1,000 Units by mouth once daily.     Family History     Problem Relation (Age of Onset)    Diabetes Father    Heart disease Mother, Father    Heart failure Mother, Father    No Known Problems Brother, Son    Stroke Father, Paternal Grandfather        Tobacco Use    Smoking status: Never Smoker    Smokeless tobacco: Never Used   Substance and Sexual Activity    Alcohol use: Yes     Alcohol/week: 0.0 oz     Comment: social drinker    Drug use: No    Sexual activity: Yes     Partners: Male     Birth control/protection: Post-menopausal     Review of Systems   Constitutional: Negative for activity change, appetite change and fatigue.   HENT: Negative for congestion and sore throat.    Eyes: Negative for photophobia and visual disturbance.   Respiratory: Positive for chest tightness and shortness of breath (dyspnea with mild exertion). Negative for cough.    Cardiovascular: Positive for chest pain. Negative for palpitations and leg swelling.   Gastrointestinal: Negative for abdominal pain, nausea and vomiting.   Endocrine: Negative for polydipsia and polyuria.   Genitourinary: Negative for dysuria and hematuria.   Musculoskeletal: Negative for arthralgias and myalgias.   Skin: Negative for color change and rash.   Neurological: Negative for dizziness, syncope and light-headedness.     Objective:     Vital Signs (Most Recent):  Temp: 97.8 °F (36.6 °C) (07/09/19 0441)  Pulse: 78 (07/09/19 0722)  Resp: 18 (07/09/19 0045)  BP: 139/63 (07/09/19 0441)  SpO2: 95 % (07/09/19 0441) Vital Signs (24h Range):  Temp:  [97.8 °F (36.6 °C)-98.4 °F (36.9 °C)] 97.8 °F (36.6 °C)  Pulse:  [70-98] 78  Resp:  [17-18] 18  SpO2:  [93 %-96 %] 95 %  BP: (125-168)/(63-75) 139/63     Weight: 104.7 kg (230 lb 13.2 oz)  Body mass index is 42.22 kg/m².    Physical Exam   Constitutional: She is oriented to  person, place, and time. She appears well-developed and well-nourished. No distress.   HENT:   Head: Normocephalic and atraumatic.   Mouth/Throat: Oropharynx is clear and moist.   Eyes: Pupils are equal, round, and reactive to light. Conjunctivae and EOM are normal. No scleral icterus.   Neck: Normal range of motion. Neck supple. No JVD present.   Cardiovascular: Normal rate, regular rhythm, normal heart sounds and intact distal pulses. Exam reveals no gallop and no friction rub.   No murmur heard.  Pulmonary/Chest: Effort normal and breath sounds normal.   Abdominal: Soft. Bowel sounds are normal. She exhibits no distension. There is no tenderness. There is no guarding.   Musculoskeletal: Normal range of motion. She exhibits no edema, tenderness or deformity.   Lymphadenopathy:     She has no cervical adenopathy.   Neurological: She is alert and oriented to person, place, and time. No cranial nerve deficit.   Skin: Skin is warm and dry. No erythema.   Nursing note and vitals reviewed.        CRANIAL NERVES     CN III, IV, VI   Pupils are equal, round, and reactive to light.  Extraocular motions are normal.        Significant Labs: All pertinent labs within the past 24 hours have been reviewed.    Significant Imaging: I have reviewed all pertinent imaging results/findings within the past 24 hours.    Assessment/Plan:     * NSTEMI (non-ST elevated myocardial infarction)  NSTEMI pathway initiated with DAPT, low-intensity heparin infusion, Interventional cardiology consult.  Will recheck troponin to make sure not continuing upward trend.      Type 2 diabetes mellitus with stage 3 chronic kidney disease, without long-term current use of insulin  Patient on diraglutide injectection qweekly (missed yesterday) and daily sulfonylurea.  Counseled patient that we don't use these agents in the hospital for safety reasons and instead use insulin, which she did not like hearing but after further discussion (and some stink eye)  she agreed with the agreement that this only for inpatient use and that she would be resuming her outpatient medications upon discharge.  Last HbA1c 8.0; will recheck.  Patient NPO for [facility] today, will put on low-dose SSI only for now but once eating again would probably benefit from levemir 10 units as well.      CKD (chronic kidney disease) stage 3, GFR 30-59 ml/min  Dose renally-cleared medications accordingly.        KAMRAN on CPAP  Will order CPAP qhs      Morbid obesity with body mass index (BMI) of 40.0 or higher  Nutrition and cardiac rehab referrals would probably also yield benefit in weight loss improving BP control, improved diabetes control, improved cardiovascular outcomes, etc.      Hyperlipidemia  Will increase statin from 10mg to 40mg in context of ACS.      Hypertensive heart disease with diastolic heart failure  2DE in am to reassess function.  ARB recalled; will replace with losartan 100mg        VTE Risk Mitigation (From admission, onward)        Ordered     heparin 25,000 units in dextrose 5% 250 mL (100 units/mL) infusion LOW INTENSITY nomogram - [facility]  Continuous      07/09/19 0312     heparin 25,000 units in dextrose 5% (100 units/ml) IV bolus from bag - ADDITIONAL PRN BOLUS - 30 units/kg (max bolus 4000 units)  As needed (PRN)      07/09/19 0312     heparin 25,000 units in dextrose 5% (100 units/ml) IV bolus from bag - ADDITIONAL PRN BOLUS - 60 units/kg (max bolus 4000 units)  As needed (PRN)      07/09/19 0312     Reason for No Pharmacological VTE Prophylaxis  Once      07/09/19 0312     IP VTE HIGH RISK PATIENT  Once      07/09/19 0312             Wilian Garcia MD  Department of Hospital Medicine   [facility]

## 2019-07-09 NOTE — ASSESSMENT & PLAN NOTE
NSTEMI pathway initiated with DAPT, low-intensity heparin infusion, Interventional cardiology consult.  Will recheck troponin to make sure not continuing upward trend.

## 2019-07-09 NOTE — PROGRESS NOTES
Last 5 Patient Entered Readings                                      Current 30 Day Average: 135/82     Recent Readings 7/4/2019 6/30/2019 6/14/2019 6/13/2019 6/12/2019    SBP (mmHg) 125 149 144 120 136    DBP (mmHg) 63 94 75 80 96    Pulse 104 90 105 80 106          Last 6 Patient Entered Readings                                          Most Recent A1c: 7.6% on 7/9/2019  (Goal: 8%)     Recent Readings 7/4/2019 6/30/2019 6/14/2019 6/12/2019 6/7/2019    Blood Glucose (mg/dL) 171 145 169 87 200          7/9: Reviewed chart.  Pt admitted for NSTEMI. Will f/u next week.

## 2019-07-09 NOTE — PLAN OF CARE
Problem: Adult Inpatient Plan of Care  Goal: Plan of Care Review  Outcome: Ongoing (interventions implemented as appropriate)  Plan of care reviewed with patient and she states understanding. Heparin gtt continued. Left heart cath planned for today. No chest pain reported at this time. No acute events noted at this time. Patient remains free injury. Patient remains NPO at this time. VS stable. Will continue to monitor.

## 2019-07-09 NOTE — NURSING
Pt arrived on the unit via EMS. Pt denies pain or discomfort at this time.. Admit assess completed. Pt was oriented to the unit, safety socks placed on feet. Fall precautions reviewed, call bell in reach, bed in low position. Will continue to monitor.

## 2019-07-09 NOTE — PROCEDURES
"            Interventional Cardiology   Post Cath Note      Referring Physician: Amy Oconnell MD  Procedure: [facility]  Indication: NSTEMI    SUBJECTIVE:   Patient tolerated procedure well with no complications. Multivesseles disease (mLAD, ostial OM and proximal RCA) please see full cath report for more details.   Cath Results:   Access:  R Radial     See full cath report for further details    Intervention:     Closure device used: Vasc band   Patient tolerated the procedure well, no complications    Post Cath Exam:   BP (!) 166/84 (BP Location: Left arm)   Pulse 79   Temp 96.4 °F (35.8 °C) (Oral)   Resp 18   Ht 5' 2" (1.575 m)   Wt 104.7 kg (230 lb 13.2 oz)   LMP 01/09/2001 (Approximate)   SpO2 98%   Breastfeeding? No   BMI 42.22 kg/m²     No unusual pain, hematoma, thrill or bruit at vascular access site.  Distal pulse present without signs of ischemia.    Assessment / Plan:     - Multivesseles disease(mLAD, ostial OM and proximal RCA) please see full cath report for more details.   - CT surgery evaluation (consult placed).  - Hold Ticagrelor.  - Routine post cath protocol.  - IVFs for CRUZ prevention.  - Maximize medical management.  - Further care by the primary team.  - Discussed with primary team.    Attending addendum to follow     Aleks Pérez MD  Interventional Cardiovascular Fellow  Pager: [phone]          "

## 2019-07-09 NOTE — ASSESSMENT & PLAN NOTE
Nutrition and cardiac rehab referrals would probably also yield benefit in weight loss improving BP control, improved diabetes control, improved cardiovascular outcomes, etc.

## 2019-07-09 NOTE — ASSESSMENT & PLAN NOTE
Patient on diraglutide injectection qweekly (missed yesterday) and daily sulfonylurea.  Counseled patient that we don't use these agents in the hospital for safety reasons and instead use insulin, which she did not like hearing but after further discussion (and some stink eye) she agreed with the agreement that this only for inpatient use and that she would be resuming her outpatient medications upon discharge.  Last HbA1c 8.0; will recheck.  Patient NPO for C today, will put on low-dose SSI only for now but once eating again would probably benefit from levemir 10 units as well.

## 2019-07-09 NOTE — CONSULTS
"Unable to see patient today in hospital  Phase II Cardiac information packet and letter sent to patient, which includes "Your Guide to Living with Heart Disease".     Francisco Sanches RN  Cardiac Rehab Nurse    "

## 2019-07-09 NOTE — ASSESSMENT & PLAN NOTE
- Troponin elevated, typical severe chest pain now resolved  - MARCELO score 4  -CRUZ risk score low  - Agree with treatment with DAPT, high dose statin and Heparin ggt  - c/w BB and add ISMN if patient has chest pain  - If chest pain refractory after PRN Nitro given please start Nitro ggt and alert Interventional Cardiology  - Plan for LHC +/- PCI R Radial  - trend troponin with ECGs  - recommend TTE with CFD and CXR  -The risks, benefits & alternatives of the procedure were explained to the patient.    -The risks of coronary angiography include but are not limited to:  Bleeding, infection, heart rhythm abnormalities, allergic reactions, kidney injury, stroke and death.    -Should stenting be indicated, the patient has agreed to dual anti-platelet therapy for 1-consecutive year with a drug-eluting stent and a minimum of 1-month with the use of a bare metal stent.    -The risks of moderate sedation include hypotension, respiratory depression, arrhythmias, bronchospasm, & death.    -Informed consent was obtained & the patient is agreeable to proceed with the procedure.

## 2019-07-09 NOTE — CONSULTS
[facility]  Interventional Cardiology  Consult Note    Patient Name: Kaylee Romero  MRN: [id number]  Admission Date: 7/9/2019  Hospital Length of Stay: 0 days  Code Status: Full Code   Attending Provider: Amy Oconnell MD  Consulting Provider: Angela Watts MD  Primary Care Physician: Liz Roberts MD  Principal Problem:NSTEMI (non-ST elevated myocardial infarction)    Patient information was obtained from patient and ER records.     Inpatient consult to Interventional Cardiology  Consult performed by: Angela Watts MD  Consult ordered by: Wilian Garcia MD        Subjective:     Chief Complaint:  Chest Pain     HPI:  75 y/o female with hypertensive heart disease with diastolic heart failure, essential HTN, KAMRAN on CPAP, morbid obesity, Type 2 diabetes with CKD stage 3 not on home insulin was admitted to [facility] on 7/7 with chest pain. Patient reported on and off chest pain for past 1 week. Patent admitted, and serial troponins drawn that showed initial 0.03 and second set was 0.33 and third set 0.39. EKG done showed no acute ischemic changes. Patient started on Aspirin 325 mg po daily and Plavix 75 mg po daily. Patient also started on low dose beta blocker. Cardiology consulted at [facility] and recommended LHC but they do not have LHC available at [facility] so request for transfer. Patient is not having any active chest pain. Patient is on Med/Surg telemetry floor. Patient on no cardiac drips. Patient was previously seen by Dr. Pop Henderson in Cardiology clinic at [facility] in 11/2018 with intermittent chest pain and patient underwent outpatient exercise stress test hat was severely limited due to exercise tolerance but showed no signs of ischemia. Patient on no current anticoagulation. Dr. Arzola from [facility] called and stated he is okay for transfer and will consult on patient.     Past Medical  History:   Diagnosis Date    Acid reflux     Age-related osteoporosis without current pathological fracture 2019    Cataract     CKD (chronic kidney disease) stage 3, GFR 30-59 ml/min     Dry mouth     History of TIA (transient ischemic attack) 2018    Hyperlipidemia 2014    Hypertensive heart disease with diastolic heart failure 2012    Morbid obesity with body mass index (BMI) of 40.0 or higher 2016    KAMRAN (obstructive sleep apnea)     KAMRAN on CPAP 2016    Osteoporosis without current pathological fracture 2018    Physical deconditioning 2018    Status post total left knee replacement 2017    Type 2 diabetes mellitus with stage 3 chronic kidney disease, without long-term current use of insulin 2019       Past Surgical History:   Procedure Laterality Date    ankle surgery (l)      APPENDECTOMY       SECTION      DILATION AND CURETTAGE OF UTERUS      KNEE ARTHROSCOPY W/ DEBRIDEMENT      KNEE SURGERY Left 2017    TKR    REPLACEMENT-KNEE-TOTAL Left 2017    [facility] by [facility] OR [address] [place] [address]       Review of patient's allergies indicates:   Allergen Reactions    Keflex [cephalexin] Other (See Comments)     Turns orange    Bactrim [sulfamethoxazole-trimethoprim]      Generic version  Sulfa makes her sick       PTA Medications   Medication Sig    multivitamin (ONE DAILY MULTIVITAMIN) per tablet Take 1 tablet by mouth once daily.    alcohol swabs (ALCOHOL PADS) PadM Apply 1 each topically as needed.    alendronate (FOSAMAX) 70 MG tablet Take 1 tablet (70 mg total) by mouth every 7 days. Take with a full glass of water & remain upright for at least 30 minutes    amLODIPine (NORVASC) 10 MG tablet Take 1 tablet (10 mg total) by mouth once daily. For Blood Pressure.    aspirin (ECOTRIN) 81 MG EC tablet Take 1 tablet (81 mg total) by mouth once daily.    blood sugar diagnostic Strp 1 strip by  Misc.(Non-Drug; Combo Route) route once daily.    blood-glucose meter kit Use as instructed    chlorthalidone (HYGROTEN) 25 MG Tab Take 1 tablet (25 mg total) by mouth once daily.    glipiZIDE (GLUCOTROL) 10 MG TR24 Take 2 tablets (20 mg total) by mouth daily with breakfast.    lancets Misc 1 lancet by Misc.(Non-Drug; Combo Route) route once daily.    potassium chloride (MICRO-K) 10 MEQ CpSR Take 10 mEq by mouth once.    simvastatin (ZOCOR) 10 MG tablet TAKE 1 TABLET(10 MG) BY MOUTH EVERY EVENING FOR CHOLESTEROL    TRULICITY 1.5 mg/0.5 mL PnIj INJECT 1.5 MG UNDER THE SKIN ONCE A WEEK    valsartan (DIOVAN) 320 MG tablet Take 1 tablet (320 mg total) by mouth once daily.    vitamin D (VITAMIN D3) 1000 units Tab Take 1,000 Units by mouth once daily.     Family History     Problem Relation (Age of Onset)    Diabetes Father    Heart disease Mother, Father    Heart failure Mother, Father    No Known Problems Brother, Son    Stroke Father, Paternal Grandfather        Tobacco Use    Smoking status: Never Smoker    Smokeless tobacco: Never Used   Substance and Sexual Activity    Alcohol use: Yes     Alcohol/week: 0.0 oz     Comment: social drinker    Drug use: No    Sexual activity: Yes     Partners: Male     Birth control/protection: Post-menopausal     Review of Systems   Constitution: Negative for chills, decreased appetite and diaphoresis.   HENT: Negative for congestion and ear discharge.    Eyes: Negative for blurred vision and discharge.   Cardiovascular: Negative for chest pain, dyspnea on exertion, irregular heartbeat, leg swelling and paroxysmal nocturnal dyspnea.   Respiratory: Positive for shortness of breath. Negative for cough and hemoptysis.    Gastrointestinal: Negative for abdominal pain.     Objective:     Vital Signs (Most Recent):  Temp: 97.8 °F (36.6 °C) (07/09/19 0441)  Pulse: 78 (07/09/19 0722)  Resp: 18 (07/09/19 0045)  BP: 139/63 (07/09/19 0441)  SpO2: 95 % (07/09/19 0441) Vital Signs  (24h Range):  Temp:  [97.8 °F (36.6 °C)-98.4 °F (36.9 °C)] 97.8 °F (36.6 °C)  Pulse:  [70-98] 78  Resp:  [17-18] 18  SpO2:  [93 %-96 %] 95 %  BP: (125-168)/(63-75) 139/63     Weight: 104.7 kg (230 lb 13.2 oz)  Body mass index is 42.22 kg/m².    SpO2: 95 %  O2 Device (Oxygen Therapy): nasal cannula      Intake/Output Summary (Last 24 hours) at 7/9/2019 0738  Last data filed at 7/9/2019 0600  Gross per 24 hour   Intake 0 ml   Output 600 ml   Net -600 ml       Lines/Drains/Airways     Epidural Line                 Perineural Analgesia/Anesthesia Assessment (using dermatomes) Epidural 05/01/17 0626 799 days          Peripheral Intravenous Line                 Peripheral IV - Single Lumen 05/01/17 0620 Left Hand 799 days                Physical Exam   Constitutional: She is oriented to person, place, and time. She appears well-developed and well-nourished. No distress.   Eyes: Pupils are equal, round, and reactive to light. Conjunctivae are normal.   Neck: No tracheal deviation present. No thyromegaly present.   Cardiovascular: Normal rate, regular rhythm, normal heart sounds and intact distal pulses. Exam reveals no gallop and no friction rub.   No murmur heard.  Pulses:       Radial pulses are 2+ on the right side, and 2+ on the left side.        Femoral pulses are 2+ on the right side, and 2+ on the left side.  Pulmonary/Chest: Effort normal. No respiratory distress. She has decreased breath sounds. She has no wheezes. She has no rales.   Abdominal: Soft. Bowel sounds are normal. She exhibits no distension. There is no tenderness.   Musculoskeletal: She exhibits no edema or deformity.   Neurological: She is alert and oriented to person, place, and time. No cranial nerve deficit. Coordination normal.   Skin: Skin is warm and dry. She is not diaphoretic.   Psychiatric: She has a normal mood and affect. Her behavior is normal.       Significant Labs:   BMP:   Recent Labs   Lab 07/09/19  0328   *      K 4.2       CO2 24   BUN 16   CREATININE 1.1   CALCIUM 8.8       Significant Imaging: Echocardiogram: Transthoracic echo (TTE) complete (Cupid Only): No results found for this or any previous visit.    Assessment and Plan:     * NSTEMI (non-ST elevated myocardial infarction)  - Troponin elevated, typical severe chest pain now resolved  - MARCELO score 4  -CRUZ risk score low  - Agree with treatment with DAPT, high dose statin and Heparin ggt  - c/w BB and add ISMN if patient has chest pain  - If chest pain refractory after PRN Nitro given please start Nitro ggt and alert Interventional Cardiology  - Plan for LHC +/- PCI R Radial  - trend troponin with ECGs  - recommend TTE with CFD and CXR  -The risks, benefits & alternatives of the procedure were explained to the patient.    -The risks of coronary angiography include but are not limited to:  Bleeding, infection, heart rhythm abnormalities, allergic reactions, kidney injury, stroke and death.    -Should stenting be indicated, the patient has agreed to dual anti-platelet therapy for 1-consecutive year with a drug-eluting stent and a minimum of 1-month with the use of a bare metal stent.    -The risks of moderate sedation include hypotension, respiratory depression, arrhythmias, bronchospasm, & death.    -Informed consent was obtained & the patient is agreeable to proceed with the procedure.        VTE Risk Mitigation (From admission, onward)        Ordered     heparin 25,000 units in dextrose 5% 250 mL (100 units/mL) infusion LOW INTENSITY nomogram - [facility]  Continuous      07/09/19 0312     heparin 25,000 units in dextrose 5% (100 units/ml) IV bolus from bag - ADDITIONAL PRN BOLUS - 30 units/kg (max bolus 4000 units)  As needed (PRN)      07/09/19 0312     heparin 25,000 units in dextrose 5% (100 units/ml) IV bolus from bag - ADDITIONAL PRN BOLUS - 60 units/kg (max bolus 4000 units)  As needed (PRN)      07/09/19 0312     Reason for No Pharmacological VTE Prophylaxis  Once       07/09/19 0312     IP VTE HIGH RISK PATIENT  Once      07/09/19 0312          Thank you for your consult. I will follow-up with patient. Please contact us if you have any additional questions.    Angela Watts MD  Interventional Cardiology   [facility]

## 2019-07-09 NOTE — SUBJECTIVE & OBJECTIVE
Past Medical History:   Diagnosis Date    Acid reflux     Age-related osteoporosis without current pathological fracture 2019    Cataract     CKD (chronic kidney disease) stage 3, GFR 30-59 ml/min     Dry mouth     History of TIA (transient ischemic attack) 2018    Hyperlipidemia 2014    Hypertensive heart disease with diastolic heart failure 2012    Morbid obesity with body mass index (BMI) of 40.0 or higher 2016    KAMRAN (obstructive sleep apnea)     KAMRAN on CPAP 2016    Osteoporosis without current pathological fracture 2018    Physical deconditioning 2018    Status post total left knee replacement 2017    Type 2 diabetes mellitus with stage 3 chronic kidney disease, without long-term current use of insulin 2019       Past Surgical History:   Procedure Laterality Date    ankle surgery (l)      APPENDECTOMY       SECTION      DILATION AND CURETTAGE OF UTERUS      KNEE ARTHROSCOPY W/ DEBRIDEMENT      KNEE SURGERY Left 2017    TKR    REPLACEMENT-KNEE-TOTAL Left 2017    [facility] by [facility] OR [address] [place] [address]       Review of patient's allergies indicates:   Allergen Reactions    Keflex [cephalexin] Other (See Comments)     Turns orange    Bactrim [sulfamethoxazole-trimethoprim]      Generic version  Sulfa makes her sick       No current facility-administered medications on file prior to encounter.      Current Outpatient Medications on File Prior to Encounter   Medication Sig    multivitamin (ONE DAILY MULTIVITAMIN) per tablet Take 1 tablet by mouth once daily.    alcohol swabs (ALCOHOL PADS) PadM Apply 1 each topically as needed.    alendronate (FOSAMAX) 70 MG tablet Take 1 tablet (70 mg total) by mouth every 7 days. Take with a full glass of water & remain upright for at least 30 minutes    amLODIPine (NORVASC) 10 MG tablet Take 1 tablet (10 mg total) by mouth once daily. For Blood Pressure.     aspirin (ECOTRIN) 81 MG EC tablet Take 1 tablet (81 mg total) by mouth once daily.    blood sugar diagnostic Strp 1 strip by Misc.(Non-Drug; Combo Route) route once daily.    blood-glucose meter kit Use as instructed    chlorthalidone (HYGROTEN) 25 MG Tab Take 1 tablet (25 mg total) by mouth once daily.    glipiZIDE (GLUCOTROL) 10 MG TR24 Take 2 tablets (20 mg total) by mouth daily with breakfast.    lancets Misc 1 lancet by Misc.(Non-Drug; Combo Route) route once daily.    potassium chloride (MICRO-K) 10 MEQ CpSR Take 10 mEq by mouth once.    simvastatin (ZOCOR) 10 MG tablet TAKE 1 TABLET(10 MG) BY MOUTH EVERY EVENING FOR CHOLESTEROL    TRULICITY 1.5 mg/0.5 mL PnIj INJECT 1.5 MG UNDER THE SKIN ONCE A WEEK    valsartan (DIOVAN) 320 MG tablet Take 1 tablet (320 mg total) by mouth once daily.    vitamin D (VITAMIN D3) 1000 units Tab Take 1,000 Units by mouth once daily.     Family History     Problem Relation (Age of Onset)    Diabetes Father    Heart disease Mother, Father    Heart failure Mother, Father    No Known Problems Brother, Son    Stroke Father, Paternal Grandfather        Tobacco Use    Smoking status: Never Smoker    Smokeless tobacco: Never Used   Substance and Sexual Activity    Alcohol use: Yes     Alcohol/week: 0.0 oz     Comment: social drinker    Drug use: No    Sexual activity: Yes     Partners: Male     Birth control/protection: Post-menopausal     Review of Systems   Constitutional: Negative for activity change, appetite change and fatigue.   HENT: Negative for congestion and sore throat.    Eyes: Negative for photophobia and visual disturbance.   Respiratory: Positive for chest tightness and shortness of breath (dyspnea with mild exertion). Negative for cough.    Cardiovascular: Positive for chest pain. Negative for palpitations and leg swelling.   Gastrointestinal: Negative for abdominal pain, nausea and vomiting.   Endocrine: Negative for polydipsia and polyuria.    Genitourinary: Negative for dysuria and hematuria.   Musculoskeletal: Negative for arthralgias and myalgias.   Skin: Negative for color change and rash.   Neurological: Negative for dizziness, syncope and light-headedness.     Objective:     Vital Signs (Most Recent):  Temp: 97.8 °F (36.6 °C) (07/09/19 0441)  Pulse: 78 (07/09/19 0722)  Resp: 18 (07/09/19 0045)  BP: 139/63 (07/09/19 0441)  SpO2: 95 % (07/09/19 0441) Vital Signs (24h Range):  Temp:  [97.8 °F (36.6 °C)-98.4 °F (36.9 °C)] 97.8 °F (36.6 °C)  Pulse:  [70-98] 78  Resp:  [17-18] 18  SpO2:  [93 %-96 %] 95 %  BP: (125-168)/(63-75) 139/63     Weight: 104.7 kg (230 lb 13.2 oz)  Body mass index is 42.22 kg/m².    Physical Exam   Constitutional: She is oriented to person, place, and time. She appears well-developed and well-nourished. No distress.   HENT:   Head: Normocephalic and atraumatic.   Mouth/Throat: Oropharynx is clear and moist.   Eyes: Pupils are equal, round, and reactive to light. Conjunctivae and EOM are normal. No scleral icterus.   Neck: Normal range of motion. Neck supple. No JVD present.   Cardiovascular: Normal rate, regular rhythm, normal heart sounds and intact distal pulses. Exam reveals no gallop and no friction rub.   No murmur heard.  Pulmonary/Chest: Effort normal and breath sounds normal.   Abdominal: Soft. Bowel sounds are normal. She exhibits no distension. There is no tenderness. There is no guarding.   Musculoskeletal: Normal range of motion. She exhibits no edema, tenderness or deformity.   Lymphadenopathy:     She has no cervical adenopathy.   Neurological: She is alert and oriented to person, place, and time. No cranial nerve deficit.   Skin: Skin is warm and dry. No erythema.   Nursing note and vitals reviewed.        CRANIAL NERVES     CN III, IV, VI   Pupils are equal, round, and reactive to light.  Extraocular motions are normal.        Significant Labs: All pertinent labs within the past 24 hours have been  reviewed.    Significant Imaging: I have reviewed all pertinent imaging results/findings within the past 24 hours.

## 2019-07-09 NOTE — HPI
75 y/o female with hypertensive heart disease with diastolic heart failure, essential HTN, KAMRAN on CPAP, morbid obesity, Type 2 diabetes with CKD stage 3 not on home insulin was admitted to [facility] on 7/7 with chest pain. Patient reported on and off chest pain for past 1 week. Patent admitted, and serial troponins drawn that showed initial 0.03 and second set was 0.33 and third set 0.39. EKG done showed no acute ischemic changes. Patient started on Aspirin 325 mg po daily and Plavix 75 mg po daily. Patient also started on low dose beta blocker. Cardiology consulted at [facility] and recommended LHC but they do not have LHC available at [facility] so request for transfer. Patient is not having any active chest pain. Patient is on Med/Surg telemetry floor. Patient on no cardiac drips. Patient was previously seen by Dr. Pop Henderson in Cardiology clinic at [facility] in 11/2018 with intermittent chest pain and patient underwent outpatient exercise stress test hat was severely limited due to exercise tolerance but showed no signs of ischemia. Patient on no current anticoagulation. Dr. Arzola from [facility] called and stated he is okay for transfer and will consult on patient.

## 2019-07-09 NOTE — Clinical Note
Angiography performed of the left coronary arteries in multiple views. Angiography performed via hand injection with .

## 2019-07-10 ENCOUNTER — PATIENT MESSAGE (OUTPATIENT)
Dept: OTHER | Facility: OTHER | Age: 74
End: 2019-07-10

## 2019-07-10 LAB
APTT BLDCRRT: 22.3 SEC (ref 21–32)
BASOPHILS # BLD AUTO: 0.02 K/UL (ref 0–0.2)
BASOPHILS NFR BLD: 0.3 % (ref 0–1.9)
DIFFERENTIAL METHOD: ABNORMAL
EOSINOPHIL # BLD AUTO: 0.2 K/UL (ref 0–0.5)
EOSINOPHIL NFR BLD: 2.8 % (ref 0–8)
ERYTHROCYTE [DISTWIDTH] IN BLOOD BY AUTOMATED COUNT: 13.5 % (ref 11.5–14.5)
HCT VFR BLD AUTO: 32.2 % (ref 37–48.5)
HGB BLD-MCNC: 10.4 G/DL (ref 12–16)
IMM GRANULOCYTES # BLD AUTO: 0.03 K/UL (ref 0–0.04)
IMM GRANULOCYTES NFR BLD AUTO: 0.5 % (ref 0–0.5)
LYMPHOCYTES # BLD AUTO: 1.4 K/UL (ref 1–4.8)
LYMPHOCYTES NFR BLD: 21.3 % (ref 18–48)
MCH RBC QN AUTO: 27.4 PG (ref 27–31)
MCHC RBC AUTO-ENTMCNC: 32.3 G/DL (ref 32–36)
MCV RBC AUTO: 85 FL (ref 82–98)
MONOCYTES # BLD AUTO: 0.5 K/UL (ref 0.3–1)
MONOCYTES NFR BLD: 7.2 % (ref 4–15)
NEUTROPHILS # BLD AUTO: 4.3 K/UL (ref 1.8–7.7)
NEUTROPHILS NFR BLD: 67.9 % (ref 38–73)
NRBC BLD-RTO: 0 /100 WBC
PLATELET # BLD AUTO: 214 K/UL (ref 150–350)
PMV BLD AUTO: 9.4 FL (ref 9.2–12.9)
POCT GLUCOSE: 169 MG/DL (ref 70–110)
POCT GLUCOSE: 183 MG/DL (ref 70–110)
POCT GLUCOSE: 215 MG/DL (ref 70–110)
RBC # BLD AUTO: 3.8 M/UL (ref 4–5.4)
WBC # BLD AUTO: 6.35 K/UL (ref 3.9–12.7)

## 2019-07-10 PROCEDURE — 25000003 PHARM REV CODE 250: Performed by: NURSE PRACTITIONER

## 2019-07-10 PROCEDURE — 99900035 HC TECH TIME PER 15 MIN (STAT)

## 2019-07-10 PROCEDURE — 99223 1ST HOSP IP/OBS HIGH 75: CPT | Mod: ,,, | Performed by: NURSE PRACTITIONER

## 2019-07-10 PROCEDURE — 25000003 PHARM REV CODE 250: Performed by: HOSPITALIST

## 2019-07-10 PROCEDURE — 36415 COLL VENOUS BLD VENIPUNCTURE: CPT

## 2019-07-10 PROCEDURE — 99233 PR SUBSEQUENT HOSPITAL CARE,LEVL III: ICD-10-PCS | Mod: ,,, | Performed by: NURSE PRACTITIONER

## 2019-07-10 PROCEDURE — 94660 CPAP INITIATION&MGMT: CPT

## 2019-07-10 PROCEDURE — 85730 THROMBOPLASTIN TIME PARTIAL: CPT

## 2019-07-10 PROCEDURE — 20600001 HC STEP DOWN PRIVATE ROOM

## 2019-07-10 PROCEDURE — 27000221 HC OXYGEN, UP TO 24 HOURS

## 2019-07-10 PROCEDURE — 99233 SBSQ HOSP IP/OBS HIGH 50: CPT | Mod: ,,, | Performed by: NURSE PRACTITIONER

## 2019-07-10 PROCEDURE — 99223 PR INITIAL HOSPITAL CARE,LEVL III: ICD-10-PCS | Mod: ,,, | Performed by: NURSE PRACTITIONER

## 2019-07-10 PROCEDURE — 63600175 PHARM REV CODE 636 W HCPCS: Performed by: HOSPITALIST

## 2019-07-10 PROCEDURE — 94761 N-INVAS EAR/PLS OXIMETRY MLT: CPT

## 2019-07-10 RX ORDER — CARVEDILOL 6.25 MG/1
6.25 TABLET ORAL 2 TIMES DAILY
Status: DISCONTINUED | OUTPATIENT
Start: 2019-07-10 | End: 2019-07-12 | Stop reason: HOSPADM

## 2019-07-10 RX ORDER — CANDESARTAN 16 MG/1
32 TABLET ORAL DAILY
Status: DISCONTINUED | OUTPATIENT
Start: 2019-07-10 | End: 2019-07-12 | Stop reason: HOSPADM

## 2019-07-10 RX ADMIN — CARVEDILOL 6.25 MG: 6.25 TABLET, FILM COATED ORAL at 08:07

## 2019-07-10 RX ADMIN — VITAMIN D, TAB 1000IU (100/BT) 1000 UNITS: 25 TAB at 09:07

## 2019-07-10 RX ADMIN — CANDESARTAN CILEXETIL 32 MG: 16 TABLET ORAL at 09:07

## 2019-07-10 RX ADMIN — CHLORTHALIDONE 25 MG: 25 TABLET ORAL at 09:07

## 2019-07-10 RX ADMIN — AMLODIPINE BESYLATE 10 MG: 10 TABLET ORAL at 09:07

## 2019-07-10 RX ADMIN — ATORVASTATIN CALCIUM 80 MG: 20 TABLET, FILM COATED ORAL at 09:07

## 2019-07-10 RX ADMIN — ASPIRIN 81 MG: 81 TABLET, COATED ORAL at 09:07

## 2019-07-10 RX ADMIN — NITROGLYCERIN 0.4 MG: 0.4 TABLET SUBLINGUAL at 09:07

## 2019-07-10 RX ADMIN — INSULIN ASPART 2 UNITS: 100 INJECTION, SOLUTION INTRAVENOUS; SUBCUTANEOUS at 12:07

## 2019-07-10 RX ADMIN — CARVEDILOL 6.25 MG: 6.25 TABLET, FILM COATED ORAL at 09:07

## 2019-07-10 NOTE — CONSULTS
[facility]  Cardiothoracic Surgery  Consult Note    Patient Name: Kaylee Romero  MRN: [id number]  Admission Date: 7/9/2019  Attending Physician: Amy Oconnell MD  Referring Provider: Narda Crane MD    Patient information was obtained from patient and past medical records.     Inpatient consult to Cardiothoracic Surgery  Consult performed by: Keila Warner NP  Consult ordered by: Aleks Pérez MD  Reason for consult: CABG eval        Subjective:     Principal Problem: NSTEMI (non-ST elevated myocardial infarction)    History of Present Illness: 74F presents as transfer from [facility] for NSTEMI.  She went to the ED there Sunday after experiencing severe left-sided chest pressure radiating to her arm which occurred suddenly before she was about to go to [place].  She went inside and took a 500mg ASA and it gradually subsided.  She had a similar event several days earlier which similarly resolved but didn't seek care for it then, not thinking that it could be cardiac in origin.  There her initial troponin was 0.03, which went up to 0.3.  She was given ASA and plavix, but no heparin.  There are no cardiology services there so she was transferred here for further management.  Her cardiologist Dr Henderson is here.       No current facility-administered medications on file prior to encounter.      Current Outpatient Medications on File Prior to Encounter   Medication Sig    multivitamin (ONE DAILY MULTIVITAMIN) per tablet Take 1 tablet by mouth once daily.    alcohol swabs (ALCOHOL PADS) PadM Apply 1 each topically as needed.    alendronate (FOSAMAX) 70 MG tablet Take 1 tablet (70 mg total) by mouth every 7 days. Take with a full glass of water & remain upright for at least 30 minutes    amLODIPine (NORVASC) 10 MG tablet Take 1 tablet (10 mg total) by mouth once daily. For Blood Pressure.    aspirin (ECOTRIN) 81 MG EC tablet Take 1 tablet (81 mg total) by mouth once  daily.    blood sugar diagnostic Strp 1 strip by Misc.(Non-Drug; Combo Route) route once daily.    blood-glucose meter kit Use as instructed    chlorthalidone (HYGROTEN) 25 MG Tab Take 1 tablet (25 mg total) by mouth once daily.    glipiZIDE (GLUCOTROL) 10 MG TR24 Take 2 tablets (20 mg total) by mouth daily with breakfast.    lancets Misc 1 lancet by Misc.(Non-Drug; Combo Route) route once daily.    potassium chloride (MICRO-K) 10 MEQ CpSR Take 10 mEq by mouth once.    simvastatin (ZOCOR) 10 MG tablet TAKE 1 TABLET(10 MG) BY MOUTH EVERY EVENING FOR CHOLESTEROL    TRULICITY 1.5 mg/0.5 mL PnIj INJECT 1.5 MG UNDER THE SKIN ONCE A WEEK    valsartan (DIOVAN) 320 MG tablet Take 1 tablet (320 mg total) by mouth once daily.    vitamin D (VITAMIN D3) 1000 units Tab Take 1,000 Units by mouth once daily.       Review of patient's allergies indicates:   Allergen Reactions    Bactrim [sulfamethoxazole-trimethoprim] Other (See Comments)     Generic version  Sulfa makes her sick    Keflex [cephalexin] Other (See Comments)     Turns orange       Past Medical History:   Diagnosis Date    Acid reflux     Age-related osteoporosis without current pathological fracture 1/11/2019    Cataract     CKD (chronic kidney disease) stage 3, GFR 30-59 ml/min     Dry mouth     History of TIA (transient ischemic attack) 12/11/2018    Hyperlipidemia 12/2/2014    Hypertensive heart disease with diastolic heart failure 11/28/2012    Morbid obesity with body mass index (BMI) of 40.0 or higher 5/9/2016    KAMRAN (obstructive sleep apnea)     KAMRAN on CPAP 6/28/2016    Osteoporosis without current pathological fracture 11/11/2018    Physical deconditioning 11/5/2018    Status post total left knee replacement 5/1/2017 5/16/2017    Type 2 diabetes mellitus with stage 3 chronic kidney disease, without long-term current use of insulin 5/16/2019     Past Surgical History:   Procedure Laterality Date    ankle surgery (l)       APPENDECTOMY       SECTION      DILATION AND CURETTAGE OF UTERUS      KNEE ARTHROSCOPY W/ DEBRIDEMENT      KNEE SURGERY Left 2017    TKR    REPLACEMENT-KNEE-TOTAL Left 2017    [facility] by [facility] OR [address] [place] [address]     Family History     Problem Relation (Age of Onset)    Diabetes Father    Heart disease Mother, Father    Heart failure Mother, Father    No Known Problems Brother, Son    Stroke Father, Paternal Grandfather        Tobacco Use    Smoking status: Never Smoker    Smokeless tobacco: Never Used   Substance and Sexual Activity    Alcohol use: Yes     Alcohol/week: 0.0 oz     Comment: social drinker    Drug use: No    Sexual activity: Yes     Partners: Male     Birth control/protection: Post-menopausal     Review of Systems   Constitutional: Negative for activity change and fatigue.        Pt states she has felt ill for months     Respiratory: Positive for shortness of breath. Negative for cough.    Cardiovascular: Positive for chest pain. Negative for palpitations and leg swelling.   Gastrointestinal: Negative for abdominal pain, nausea and vomiting.   Endocrine: Negative for polydipsia, polyphagia and polyuria.   Genitourinary: Negative for dysuria.   Musculoskeletal: Negative for gait problem.   Skin: Negative for rash.   Allergic/Immunologic: Negative for immunocompromised state.   Neurological: Positive for weakness. Negative for dizziness and syncope.   Hematological: Does not bruise/bleed easily.   Psychiatric/Behavioral: Negative for behavioral problems.     Objective:     Vital Signs (Most Recent):  Temp: 98.8 °F (37.1 °C) (07/10/19 0853)  Pulse: 86 (07/10/19 0853)  Resp: 18 (07/10/19 0853)  BP: (!) 143/73 (07/10/19 0853)  SpO2: 95 % (07/10/19 0853) Vital Signs (24h Range):  Temp:  [96.4 °F (35.8 °C)-98.8 °F (37.1 °C)] 98.8 °F (37.1 °C)  Pulse:  [] 86  Resp:  [18] 18  SpO2:  [93 %-98 %] 95 %  BP: (134-197)/(58-97) 143/73     Weight: 102.8 kg (226 lb  10.1 oz)  Body mass index is 41.45 kg/m².    SpO2: 95 %  O2 Device (Oxygen Therapy): room air     Intake/Output - Last 3 Shifts       07/08 0700 - 07/09 0659 07/09 0700 - 07/10 0659 07/10 0700 - 07/11 0659    P.O. 0 500 240    Total Intake(mL/kg) 0 (0) 500 (4.9) 240 (2.3)    Urine (mL/kg/hr) 600 1240 (0.5) 700 (2.8)    Stool  0 0    Total Output 600 1240 700    Net -600 -740 -460           Stool Occurrence  1 x 1 x           Lines/Drains/Airways     Epidural Line                 Perineural Analgesia/Anesthesia Assessment (using dermatomes) Epidural 05/01/17 0626 800 days          Peripheral Intravenous Line                 Peripheral IV - Single Lumen 05/01/17 0620 Left Hand 800 days                 STS Risk Score: 2%    Physical Exam   Constitutional: She is oriented to person, place, and time. She appears well-developed and well-nourished.   obese   HENT:   Head: Normocephalic.   Nose: Nose normal.   Eyes: EOM are normal.   Neck: Normal range of motion.   Cardiovascular: Normal rate, regular rhythm and normal heart sounds.   Pulmonary/Chest: Effort normal and breath sounds normal.   Abdominal: Soft.   Musculoskeletal: Normal range of motion.   Neurological: She is alert and oriented to person, place, and time.   Skin: Skin is warm, dry and intact.   Psychiatric: She has a normal mood and affect.       Significant Labs:  BMP:   Recent Labs   Lab 07/09/19  0328   *      K 4.2      CO2 24   BUN 16   CREATININE 1.1   CALCIUM 8.8     CBC:   Recent Labs   Lab 07/09/19  2351   WBC 6.35   RBC 3.80*   HGB 10.4*   HCT 32.2*      MCV 85   MCH 27.4   MCHC 32.3     Significant Diagnostics:  LHC:  Left Main   The vessel exhibits minimal luminal irregularities.   Left Anterior Descending   Mid LAD lesion is 80% stenosed.   Mid LAD to Dist LAD lesion is 80% stenosed.   First Diagonal Branch   There is mild diffuse disease throughout the vessel.   Second Diagonal Branch   The vessel exhibits minimal  luminal irregularities.   Left Circumflex   There is moderate diffuse disease throughout the vessel.   First Obtuse Marginal Branch   1st Mrg lesion is 75% stenosed.   Right Coronary Artery   Prox RCA to Mid RCA lesion is 95% stenosed.   Dist RCA lesion is 60% stenosed.   Right Posterior Descending Artery   There is mild diffuse disease throughout the vessel.   First Right Posterolateral   The vessel exhibits minimal luminal irregularities.     ECHO:   · Normal left ventricular systolic function. The estimated ejection fraction is 68%  · Concentric left ventricular remodeling.  · Normal LV diastolic function.  · No wall motion abnormalities.  · Normal right ventricular systolic function.    Assessment/Plan:     NYHA Score: NYHA II: slight limitation of physical activity, comfortable at rest    * NSTEMI (non-ST elevated myocardial infarction)  Concerns with patient frailty, frequent falls and unable to move herself w/o arm use. Pt states herself she feels to weak at this time for surgery. Would consult interventional cards for possible PCI. Dr. Ventura to review films and give final recommendations.         Thank you for your consult. I will follow-up with patient. Please contact us if you have any additional questions.    Keila Warner NP  Cardiothoracic Surgery  [facility] Attending Note:    I have personally taken the history and examined this patient and agree with the KIRAN's note as stated above. She is chest pain free and looks good. I spoke with the patient and her family in detail about bypass surgery. I believe this will be the best and most durable treatment for her coronary disease. She will consider her options and make an informed decision. If she is discharged then I will see her in clinic to discuss surgery.

## 2019-07-10 NOTE — PLAN OF CARE
Problem: Adult Inpatient Plan of Care  Goal: Plan of Care Review  Outcome: Ongoing (interventions implemented as appropriate)  Pt. AAO x 4, afebrile, Tele SR HR 80-90s,  at the bedside- interacting with the patient and participating in care.  , 215, 169-sliding scale given per orders  C/o of SOB and chest tightness relieved by PRN 2L O2 and Sublingual nitroglycerin x 1  Carvedilol added, Candesartan increased, and Metoprolol d/c'd  Pt. Instructed to call staff for ambulatory needs- night shift reported patient would desat and become lightheaded during ambulation. Bed alarm placed, educated patient on effects of orthostatic hypotension and the importance to change positions slowly   Safety precautions maintained throughout the shift, call light within reach, and nonslip socks when out of bed.

## 2019-07-10 NOTE — CONSULTS
Food & Nutrition  Education    Diet Education: Cardiac  Time Spent: 20 minutes  Learners: Pt    Nutrition Education provided with handouts: Yes    Comments: Provided and discussed handouts on low Na and cardiac TLC diets. All questions and concerns answered. Dietitian's contact information provided.     Follow-Up: Yes    Please Re-consult as needed  Thanks!

## 2019-07-10 NOTE — HPI
74F presents as transfer from [facility] for NSTEMI.  She went to the ED there Sunday after experiencing severe left-sided chest pressure radiating to her arm which occurred suddenly before she was about to go to [place].  She went inside and took a 500mg ASA and it gradually subsided.  She had a similar event several days earlier which similarly resolved but didn't seek care for it then, not thinking that it could be cardiac in origin.  There her initial troponin was 0.03, which went up to 0.3.  She was given ASA and plavix, but no heparin.  There are no cardiology services there so she was transferred here for further management.  Her cardiologist Dr Henderson is here.

## 2019-07-10 NOTE — PROGRESS NOTES
07/10/19 0950   Vital Signs   Pulse 76   SpO2 98 %   Flow (L/min) 2   O2 Device (Oxygen Therapy) nasal cannula   BP (!) 149/78   MAP (mmHg) 108   BP Location Left arm   BP Method Automatic   Patient Position Lying    Pt. C/o chest heaviness, denies pain, VSS, Tele SR, 2L NC placed per patient's c/o of SOB despite 95% O2 RA.    Notified ALBINA Radford NP of pt. Status; NP at the bedside and examined the patient.     Administered 1 dose of Nitroglycerin 0.4 mg Sublingual and chest heaviness subsided

## 2019-07-10 NOTE — ASSESSMENT & PLAN NOTE
Concerns with patient frailty, frequent falls and unable to move herself w/o arm use. Pt states herself she feels to weak at this time for surgery. Would consult interventional cards for possible PCI. Dr. Ventura to review and give final recommendations.

## 2019-07-10 NOTE — SUBJECTIVE & OBJECTIVE
No current facility-administered medications on file prior to encounter.      Current Outpatient Medications on File Prior to Encounter   Medication Sig    multivitamin (ONE DAILY MULTIVITAMIN) per tablet Take 1 tablet by mouth once daily.    alcohol swabs (ALCOHOL PADS) PadM Apply 1 each topically as needed.    alendronate (FOSAMAX) 70 MG tablet Take 1 tablet (70 mg total) by mouth every 7 days. Take with a full glass of water & remain upright for at least 30 minutes    amLODIPine (NORVASC) 10 MG tablet Take 1 tablet (10 mg total) by mouth once daily. For Blood Pressure.    aspirin (ECOTRIN) 81 MG EC tablet Take 1 tablet (81 mg total) by mouth once daily.    blood sugar diagnostic Strp 1 strip by Misc.(Non-Drug; Combo Route) route once daily.    blood-glucose meter kit Use as instructed    chlorthalidone (HYGROTEN) 25 MG Tab Take 1 tablet (25 mg total) by mouth once daily.    glipiZIDE (GLUCOTROL) 10 MG TR24 Take 2 tablets (20 mg total) by mouth daily with breakfast.    lancets Misc 1 lancet by Misc.(Non-Drug; Combo Route) route once daily.    potassium chloride (MICRO-K) 10 MEQ CpSR Take 10 mEq by mouth once.    simvastatin (ZOCOR) 10 MG tablet TAKE 1 TABLET(10 MG) BY MOUTH EVERY EVENING FOR CHOLESTEROL    TRULICITY 1.5 mg/0.5 mL PnIj INJECT 1.5 MG UNDER THE SKIN ONCE A WEEK    valsartan (DIOVAN) 320 MG tablet Take 1 tablet (320 mg total) by mouth once daily.    vitamin D (VITAMIN D3) 1000 units Tab Take 1,000 Units by mouth once daily.       Review of patient's allergies indicates:   Allergen Reactions    Bactrim [sulfamethoxazole-trimethoprim] Other (See Comments)     Generic version  Sulfa makes her sick    Keflex [cephalexin] Other (See Comments)     Turns orange       Past Medical History:   Diagnosis Date    Acid reflux     Age-related osteoporosis without current pathological fracture 1/11/2019    Cataract     CKD (chronic kidney disease) stage 3, GFR 30-59 ml/min     Dry mouth      History of TIA (transient ischemic attack) 2018    Hyperlipidemia 2014    Hypertensive heart disease with diastolic heart failure 2012    Morbid obesity with body mass index (BMI) of 40.0 or higher 2016    KAMRAN (obstructive sleep apnea)     KAMRAN on CPAP 2016    Osteoporosis without current pathological fracture 2018    Physical deconditioning 2018    Status post total left knee replacement 2017    Type 2 diabetes mellitus with stage 3 chronic kidney disease, without long-term current use of insulin 2019     Past Surgical History:   Procedure Laterality Date    ankle surgery (l)      APPENDECTOMY       SECTION      DILATION AND CURETTAGE OF UTERUS      KNEE ARTHROSCOPY W/ DEBRIDEMENT      KNEE SURGERY Left 2017    TKR    REPLACEMENT-KNEE-TOTAL Left 2017    [facility] OR [address] [place] [address]     Family History     Problem Relation (Age of Onset)    Diabetes Father    Heart disease Mother, Father    Heart failure Mother, Father    No Known Problems Brother, Son    Stroke Father, Paternal Grandfather        Tobacco Use    Smoking status: Never Smoker    Smokeless tobacco: Never Used   Substance and Sexual Activity    Alcohol use: Yes     Alcohol/week: 0.0 oz     Comment: social drinker    Drug use: No    Sexual activity: Yes     Partners: Male     Birth control/protection: Post-menopausal     Review of Systems   Constitutional: Negative for activity change and fatigue.        Pt states she has felt ill for months     Respiratory: Positive for shortness of breath. Negative for cough.    Cardiovascular: Positive for chest pain. Negative for palpitations and leg swelling.   Gastrointestinal: Negative for abdominal pain, nausea and vomiting.   Endocrine: Negative for polydipsia, polyphagia and polyuria.   Genitourinary: Negative for dysuria.   Musculoskeletal: Negative for gait problem.   Skin: Negative for  rash.   Allergic/Immunologic: Negative for immunocompromised state.   Neurological: Positive for weakness. Negative for dizziness and syncope.   Hematological: Does not bruise/bleed easily.   Psychiatric/Behavioral: Negative for behavioral problems.     Objective:     Vital Signs (Most Recent):  Temp: 98.8 °F (37.1 °C) (07/10/19 0853)  Pulse: 86 (07/10/19 0853)  Resp: 18 (07/10/19 0853)  BP: (!) 143/73 (07/10/19 0853)  SpO2: 95 % (07/10/19 0853) Vital Signs (24h Range):  Temp:  [96.4 °F (35.8 °C)-98.8 °F (37.1 °C)] 98.8 °F (37.1 °C)  Pulse:  [] 86  Resp:  [18] 18  SpO2:  [93 %-98 %] 95 %  BP: (134-197)/(58-97) 143/73     Weight: 102.8 kg (226 lb 10.1 oz)  Body mass index is 41.45 kg/m².    SpO2: 95 %  O2 Device (Oxygen Therapy): room air     Intake/Output - Last 3 Shifts       07/08 0700 - 07/09 0659 07/09 0700 - 07/10 0659 07/10 0700 - 07/11 0659    P.O. 0 500 240    Total Intake(mL/kg) 0 (0) 500 (4.9) 240 (2.3)    Urine (mL/kg/hr) 600 1240 (0.5) 700 (2.8)    Stool  0 0    Total Output 600 1240 700    Net -600 -740 -460           Stool Occurrence  1 x 1 x           Lines/Drains/Airways     Epidural Line                 Perineural Analgesia/Anesthesia Assessment (using dermatomes) Epidural 05/01/17 0626 800 days          Peripheral Intravenous Line                 Peripheral IV - Single Lumen 05/01/17 0620 Left Hand 800 days                 STS Risk Score: 2%    Physical Exam   Constitutional: She is oriented to person, place, and time. She appears well-developed and well-nourished.   obese   HENT:   Head: Normocephalic.   Nose: Nose normal.   Eyes: EOM are normal.   Neck: Normal range of motion.   Cardiovascular: Normal rate, regular rhythm and normal heart sounds.   Pulmonary/Chest: Effort normal and breath sounds normal.   Abdominal: Soft.   Musculoskeletal: Normal range of motion.   Neurological: She is alert and oriented to person, place, and time.   Skin: Skin is warm, dry and intact.   Psychiatric: She  has a normal mood and affect.       Significant Labs:  BMP:   Recent Labs   Lab 07/09/19  0328   *      K 4.2      CO2 24   BUN 16   CREATININE 1.1   CALCIUM 8.8     CBC:   Recent Labs   Lab 07/09/19  2351   WBC 6.35   RBC 3.80*   HGB 10.4*   HCT 32.2*      MCV 85   MCH 27.4   MCHC 32.3     Significant Diagnostics:  LHC:  Left Main   The vessel exhibits minimal luminal irregularities.   Left Anterior Descending   Mid LAD lesion is 80% stenosed.   Mid LAD to Dist LAD lesion is 80% stenosed.   First Diagonal Branch   There is mild diffuse disease throughout the vessel.   Second Diagonal Branch   The vessel exhibits minimal luminal irregularities.   Left Circumflex   There is moderate diffuse disease throughout the vessel.   First Obtuse Marginal Branch   1st Mrg lesion is 75% stenosed.   Right Coronary Artery   Prox RCA to Mid RCA lesion is 95% stenosed.   Dist RCA lesion is 60% stenosed.   Right Posterior Descending Artery   There is mild diffuse disease throughout the vessel.   First Right Posterolateral   The vessel exhibits minimal luminal irregularities.     ECHO:   · Normal left ventricular systolic function. The estimated ejection fraction is 68%  · Concentric left ventricular remodeling.  · Normal LV diastolic function.  · No wall motion abnormalities.  · Normal right ventricular systolic function.

## 2019-07-10 NOTE — CARE UPDATE
S/p [facility] triple vessel dz, CTS consulted spoke with Alex SNOWDEN fellow to inform of consult and ok to see in am.  I will f/u with Keila MORALES in am.  Reviewed plan of care with patient.  She is hesitant about having open heart surgery as she has many comorbidities including prior TBI (she forgets a lot/ repeats herself), DM 2, TIA, obesity and unable to sit up without aides of her arms or assistance.

## 2019-07-10 NOTE — PLAN OF CARE
Problem: Adult Inpatient Plan of Care  Goal: Plan of Care Review  Outcome: Ongoing (interventions implemented as appropriate)  Pt remains free of falls or injuries. Denies pain or discomfort. Plan of care reviewed with patient and family. Not questions or concerns at this time. Compliant with all requests and medications. Will continue to monitor.

## 2019-07-11 LAB
ANION GAP SERPL CALC-SCNC: 10 MMOL/L (ref 8–16)
ANION GAP SERPL CALC-SCNC: 11 MMOL/L (ref 8–16)
APTT BLDCRRT: 23.2 SEC (ref 21–32)
APTT BLDCRRT: 28.8 SEC (ref 21–32)
BASOPHILS # BLD AUTO: 0.05 K/UL (ref 0–0.2)
BASOPHILS NFR BLD: 0.7 % (ref 0–1.9)
BUN SERPL-MCNC: 19 MG/DL (ref 8–23)
BUN SERPL-MCNC: 22 MG/DL (ref 8–23)
CALCIUM SERPL-MCNC: 8.7 MG/DL (ref 8.7–10.5)
CALCIUM SERPL-MCNC: 9.3 MG/DL (ref 8.7–10.5)
CHLORIDE SERPL-SCNC: 102 MMOL/L (ref 95–110)
CHLORIDE SERPL-SCNC: 104 MMOL/L (ref 95–110)
CO2 SERPL-SCNC: 18 MMOL/L (ref 23–29)
CO2 SERPL-SCNC: 23 MMOL/L (ref 23–29)
CREAT SERPL-MCNC: 1.2 MG/DL (ref 0.5–1.4)
CREAT SERPL-MCNC: 1.4 MG/DL (ref 0.5–1.4)
DIFFERENTIAL METHOD: ABNORMAL
EOSINOPHIL # BLD AUTO: 0.2 K/UL (ref 0–0.5)
EOSINOPHIL NFR BLD: 2.3 % (ref 0–8)
ERYTHROCYTE [DISTWIDTH] IN BLOOD BY AUTOMATED COUNT: 13.6 % (ref 11.5–14.5)
EST. GFR  (AFRICAN AMERICAN): 43.3 ML/MIN/1.73 M^2
EST. GFR  (AFRICAN AMERICAN): 52.2 ML/MIN/1.73 M^2
EST. GFR  (NON AFRICAN AMERICAN): 37.6 ML/MIN/1.73 M^2
EST. GFR  (NON AFRICAN AMERICAN): 45.3 ML/MIN/1.73 M^2
GLUCOSE SERPL-MCNC: 201 MG/DL (ref 70–110)
GLUCOSE SERPL-MCNC: 278 MG/DL (ref 70–110)
HCT VFR BLD AUTO: 40.1 % (ref 37–48.5)
HGB BLD-MCNC: 12.8 G/DL (ref 12–16)
IMM GRANULOCYTES # BLD AUTO: 0.05 K/UL (ref 0–0.04)
IMM GRANULOCYTES NFR BLD AUTO: 0.7 % (ref 0–0.5)
LYMPHOCYTES # BLD AUTO: 1.4 K/UL (ref 1–4.8)
LYMPHOCYTES NFR BLD: 18.9 % (ref 18–48)
MAGNESIUM SERPL-MCNC: 1.8 MG/DL (ref 1.6–2.6)
MAGNESIUM SERPL-MCNC: 2.4 MG/DL (ref 1.6–2.6)
MCH RBC QN AUTO: 27.1 PG (ref 27–31)
MCHC RBC AUTO-ENTMCNC: 31.9 G/DL (ref 32–36)
MCV RBC AUTO: 85 FL (ref 82–98)
MONOCYTES # BLD AUTO: 0.6 K/UL (ref 0.3–1)
MONOCYTES NFR BLD: 7.6 % (ref 4–15)
NEUTROPHILS # BLD AUTO: 5.3 K/UL (ref 1.8–7.7)
NEUTROPHILS NFR BLD: 69.8 % (ref 38–73)
NRBC BLD-RTO: 0 /100 WBC
PLATELET # BLD AUTO: 234 K/UL (ref 150–350)
PMV BLD AUTO: 9.6 FL (ref 9.2–12.9)
POCT GLUCOSE: 170 MG/DL (ref 70–110)
POCT GLUCOSE: 218 MG/DL (ref 70–110)
POCT GLUCOSE: 320 MG/DL (ref 70–110)
POTASSIUM SERPL-SCNC: 4.1 MMOL/L (ref 3.5–5.1)
POTASSIUM SERPL-SCNC: 4.4 MMOL/L (ref 3.5–5.1)
RBC # BLD AUTO: 4.73 M/UL (ref 4–5.4)
SODIUM SERPL-SCNC: 132 MMOL/L (ref 136–145)
SODIUM SERPL-SCNC: 136 MMOL/L (ref 136–145)
WBC # BLD AUTO: 7.52 K/UL (ref 3.9–12.7)

## 2019-07-11 PROCEDURE — 85025 COMPLETE CBC W/AUTO DIFF WBC: CPT

## 2019-07-11 PROCEDURE — 27000221 HC OXYGEN, UP TO 24 HOURS

## 2019-07-11 PROCEDURE — 36415 COLL VENOUS BLD VENIPUNCTURE: CPT

## 2019-07-11 PROCEDURE — 63600175 PHARM REV CODE 636 W HCPCS: Performed by: NURSE PRACTITIONER

## 2019-07-11 PROCEDURE — 25000003 PHARM REV CODE 250: Performed by: HOSPITALIST

## 2019-07-11 PROCEDURE — 99233 PR SUBSEQUENT HOSPITAL CARE,LEVL III: ICD-10-PCS | Mod: ,,, | Performed by: NURSE PRACTITIONER

## 2019-07-11 PROCEDURE — 83735 ASSAY OF MAGNESIUM: CPT | Mod: 91

## 2019-07-11 PROCEDURE — 94761 N-INVAS EAR/PLS OXIMETRY MLT: CPT

## 2019-07-11 PROCEDURE — 20600001 HC STEP DOWN PRIVATE ROOM

## 2019-07-11 PROCEDURE — 99233 SBSQ HOSP IP/OBS HIGH 50: CPT | Mod: ,,, | Performed by: NURSE PRACTITIONER

## 2019-07-11 PROCEDURE — S5571 INSULIN DISPOS PEN 3 ML: HCPCS | Performed by: NURSE PRACTITIONER

## 2019-07-11 PROCEDURE — 80048 BASIC METABOLIC PNL TOTAL CA: CPT | Mod: 91

## 2019-07-11 PROCEDURE — 63600175 PHARM REV CODE 636 W HCPCS: Performed by: HOSPITALIST

## 2019-07-11 PROCEDURE — 85730 THROMBOPLASTIN TIME PARTIAL: CPT

## 2019-07-11 PROCEDURE — 99900035 HC TECH TIME PER 15 MIN (STAT)

## 2019-07-11 PROCEDURE — 25000003 PHARM REV CODE 250: Performed by: NURSE PRACTITIONER

## 2019-07-11 PROCEDURE — 85730 THROMBOPLASTIN TIME PARTIAL: CPT | Mod: 91

## 2019-07-11 PROCEDURE — 94660 CPAP INITIATION&MGMT: CPT

## 2019-07-11 RX ORDER — MAGNESIUM SULFATE HEPTAHYDRATE 40 MG/ML
2 INJECTION, SOLUTION INTRAVENOUS ONCE
Status: COMPLETED | OUTPATIENT
Start: 2019-07-11 | End: 2019-07-11

## 2019-07-11 RX ORDER — NIFEDIPINE 30 MG/1
30 TABLET, EXTENDED RELEASE ORAL DAILY
Status: DISCONTINUED | OUTPATIENT
Start: 2019-07-11 | End: 2019-07-12 | Stop reason: HOSPADM

## 2019-07-11 RX ORDER — INSULIN ASPART 100 [IU]/ML
1-10 INJECTION, SOLUTION INTRAVENOUS; SUBCUTANEOUS
Status: DISCONTINUED | OUTPATIENT
Start: 2019-07-11 | End: 2019-07-12 | Stop reason: HOSPADM

## 2019-07-11 RX ADMIN — MAGNESIUM SULFATE IN WATER 2 G: 40 INJECTION, SOLUTION INTRAVENOUS at 03:07

## 2019-07-11 RX ADMIN — TICAGRELOR 180 MG: 90 TABLET ORAL at 03:07

## 2019-07-11 RX ADMIN — ASPIRIN 81 MG: 81 TABLET, COATED ORAL at 09:07

## 2019-07-11 RX ADMIN — AMLODIPINE BESYLATE 10 MG: 10 TABLET ORAL at 09:07

## 2019-07-11 RX ADMIN — CARVEDILOL 6.25 MG: 6.25 TABLET, FILM COATED ORAL at 08:07

## 2019-07-11 RX ADMIN — CARVEDILOL 6.25 MG: 6.25 TABLET, FILM COATED ORAL at 09:07

## 2019-07-11 RX ADMIN — HEPARIN SODIUM AND DEXTROSE 12 UNITS/KG/HR: 10000; 5 INJECTION INTRAVENOUS at 05:07

## 2019-07-11 RX ADMIN — INSULIN ASPART 2 UNITS: 100 INJECTION, SOLUTION INTRAVENOUS; SUBCUTANEOUS at 08:07

## 2019-07-11 RX ADMIN — NIFEDIPINE 30 MG: 30 TABLET, FILM COATED, EXTENDED RELEASE ORAL at 03:07

## 2019-07-11 RX ADMIN — INSULIN ASPART 2 UNITS: 100 INJECTION, SOLUTION INTRAVENOUS; SUBCUTANEOUS at 05:07

## 2019-07-11 RX ADMIN — CHLORTHALIDONE 25 MG: 25 TABLET ORAL at 09:07

## 2019-07-11 RX ADMIN — TICAGRELOR 90 MG: 90 TABLET ORAL at 08:07

## 2019-07-11 RX ADMIN — ATORVASTATIN CALCIUM 80 MG: 20 TABLET, FILM COATED ORAL at 09:07

## 2019-07-11 RX ADMIN — INSULIN DETEMIR 8 UNITS: 100 INJECTION, SOLUTION SUBCUTANEOUS at 08:07

## 2019-07-11 RX ADMIN — CANDESARTAN CILEXETIL 32 MG: 16 TABLET ORAL at 09:07

## 2019-07-11 RX ADMIN — INSULIN ASPART 4 UNITS: 100 INJECTION, SOLUTION INTRAVENOUS; SUBCUTANEOUS at 11:07

## 2019-07-11 NOTE — PROGRESS NOTES
[facility] Hospital Medicine  Progress Note    Patient Name: Kaylee Romero  MRN: [id number]  Patient Class: IP- Inpatient   Admission Date: 7/9/2019  Length of Stay: 2 days  Attending Physician: Amy Oconnell MD  Primary Care Provider: Liz Roberts MD    Hospital Medicine Team: Networked reference to record PCT  Nara Asif NP    Subjective:     Principal Problem:NSTEMI (non-ST elevated myocardial infarction)      HPI:  74F presents as transfer from [facility] for NSTEMI.  She went to the ED there Sunday after experiencing severe left-sided chest pressure radiating to her arm which occurred suddenly before she was about to go to [employer].  She went inside and took a 500mg ASA and it gradually subsided.  She had a similar event several days earlier which similarly resolved but didn't seek care for it then, not thinking that it could be cardiac in origin.  There her initial troponin was 0.03, which went up to 0.3.  She was given ASA and plavix, but no heparin.  There are no cardiology services there so she was transferred here for further management.  Her cardiologist Dr Henderson is here.      Overview/Hospital Course:  Admitted to hospital medicine for NSTEMI, found to have multi vessel disease, HTN, uncontrolled DM2 7.6/171.  S/p C with multi vessel dz, Ticagrelor stopped in anticipation of CTS.  Dr. Ventura pending final consult, who may possibly deferring d/t multiple co morbidities, awaiting final say.  She's having intermittent CP relieved by SL ntg.      Dispo: +/- high risk pci vs CTS, having hard time sitting upright without using her arms.     Interval History: Gathering data on which is her best option and relinquishing her own short comings.    Review of Systems   Constitutional: Negative for activity change, appetite change, chills, fatigue and fever.   HENT: Negative for congestion, sore throat and trouble swallowing.    Eyes: Negative for redness and  visual disturbance.   Respiratory: Positive for chest tightness (chest pressure) and shortness of breath. Negative for cough and wheezing.    Cardiovascular: Positive for leg swelling. Negative for chest pain and palpitations.   Gastrointestinal: Negative for abdominal pain, constipation, diarrhea, nausea and vomiting.   Endocrine: Negative for cold intolerance and heat intolerance.   Genitourinary: Negative for decreased urine volume, difficulty urinating and hematuria.   Musculoskeletal: Negative for back pain and myalgias.   Skin: Negative for color change, rash and wound.   Allergic/Immunologic: Negative for immunocompromised state.   Neurological: Negative for dizziness, weakness, light-headedness, numbness and headaches.   Hematological: Does not bruise/bleed easily.   Psychiatric/Behavioral: Negative for agitation, decreased concentration and sleep disturbance. The patient is not nervous/anxious.      Objective:     Vital Signs (Most Recent):  Temp: 98.3 °F (36.8 °C) (07/10/19 1925)  Pulse: (!) 49 (07/10/19 2309)  Resp: 18 (07/10/19 2106)  BP: 137/75 (07/10/19 1925)  SpO2: 98 % (07/10/19 2106) Vital Signs (24h Range):  Temp:  [97.8 °F (36.6 °C)-98.8 °F (37.1 °C)] 98.3 °F (36.8 °C)  Pulse:  [49-96] 49  Resp:  [16-18] 18  SpO2:  [93 %-98 %] 98 %  BP: (137-162)/(72-81) 137/75     Weight: 102.8 kg (226 lb 10.1 oz)  Body mass index is 41.45 kg/m².    Intake/Output Summary (Last 24 hours) at 7/10/2019 1408  Last data filed at 7/10/2019 1800  Gross per 24 hour   Intake 1230 ml   Output 1641 ml   Net -411 ml      Physical Exam   Constitutional: She is oriented to person, place, and time. She appears well-developed and well-nourished. No distress.   HENT:   Head: Normocephalic and atraumatic.   Right Ear: External ear normal.   Left Ear: External ear normal.   Nose: Nose normal.   Eyes: Conjunctivae and EOM are normal.   Neck: Normal range of motion. Neck supple. No JVD present.   Cardiovascular: Normal rate, regular  rhythm, normal heart sounds and intact distal pulses.   No murmur heard.  Pulmonary/Chest: Effort normal and breath sounds normal. No respiratory distress. She has no wheezes. She has no rales.   Abdominal: Soft. Bowel sounds are normal. She exhibits no distension and no mass. There is no tenderness. There is no guarding.   Musculoskeletal: Normal range of motion. She exhibits no edema.   Neurological: She is alert and oriented to person, place, and time. No cranial nerve deficit or sensory deficit. Coordination normal.   Skin: Skin is warm and dry. No rash noted. She is not diaphoretic. No erythema.   Psychiatric: She has a normal mood and affect. Her behavior is normal. Judgment and thought content normal.   Nursing note and vitals reviewed.      Significant Labs:   BMP:   Recent Labs   Lab 07/09/19  0328   *      K 4.2      CO2 24   BUN 16   CREATININE 1.1   CALCIUM 8.8     CBC:   Recent Labs   Lab 07/09/19  0328 07/09/19  2351   WBC 5.67 6.35   HGB 11.0* 10.4*   HCT 34.8* 32.2*    214     Recent Lab Results       07/10/19  1659   07/10/19  1135   07/10/19  0813   07/10/19  0522        aPTT       22.3  Comment:  aPTT therapeutic range = 39-69 seconds     POCT Glucose 169 215 183             Significant Imaging:     [facility]: 7/9/19    Left Main   The vessel exhibits minimal luminal irregularities.   Left Anterior Descending   Mid LAD lesion is 80% stenosed.   Mid LAD to Dist LAD lesion is 80% stenosed.   First Diagonal Branch   There is mild diffuse disease throughout the vessel.   Second Diagonal Branch   The vessel exhibits minimal luminal irregularities.   Left Circumflex   There is moderate diffuse disease throughout the vessel.   First Obtuse Marginal Branch   1st Mrg lesion is 75% stenosed.   Right Coronary Artery   Prox RCA to Mid RCA lesion is 95% stenosed.   Dist RCA lesion is 60% stenosed.   Right Posterior Descending Artery   There is mild diffuse disease throughout the vessel.    First Right Posterolateral   The vessel exhibits minimal luminal irregularities.     Echo: 7/9/19  · Normal left ventricular systolic function. The estimated ejection fraction is 68%  · Concentric left ventricular remodeling.  · Normal LV diastolic function.  · No wall motion abnormalities.  · Normal right ventricular systolic function.           Assessment/Plan:      * NSTEMI (non-ST elevated myocardial infarction)  NSTEMI pathway initiated with DAPT, low-intensity heparin infusion, Interventional cardiology consult.  Will recheck troponin to make sure not continuing upward trend.      Type 2 diabetes mellitus with stage 3 chronic kidney disease, without long-term current use of insulin  Patient on diraglutide injectection qweekly (missed yesterday) and daily sulfonylurea.  Counseled patient that we don't use these agents in the hospital for safety reasons and instead use insulin, which she did not like hearing but after further discussion (and some stink eye) she agreed with the agreement that this only for inpatient use and that she would be resuming her outpatient medications upon discharge.  Last HbA1c 8.0; will recheck.  Patient NPO for [facility] today, will put on low-dose SSI only for now but once eating again would probably benefit from levemir 10 units as well.      CKD (chronic kidney disease) stage 3, GFR 30-59 ml/min  Dose renally-cleared medications accordingly.        KAMRAN on CPAP  Will order CPAP qhs      Morbid obesity with body mass index (BMI) of 40.0 or higher  Nutrition and cardiac rehab referrals would probably also yield benefit in weight loss improving BP control, improved diabetes control, improved cardiovascular outcomes, etc.      Hyperlipidemia  Will increase statin from 10mg to 40mg in context of ACS.      Hypertensive heart disease with diastolic heart failure  2DE as noted above  - ARB recalled; will replace with losartan 100mg titrated to candesartan 32mg   - chlorthalidone 25  -  metoprolol 25 bid  - amlodipine 10        VTE Risk Mitigation (From admission, onward)        Ordered     heparin 25,000 units in dextrose 5% 250 mL (100 units/mL) infusion LOW INTENSITY nomogram - [facility]  Continuous      07/09/19 0312     heparin 25,000 units in dextrose 5% (100 units/ml) IV bolus from bag - ADDITIONAL PRN BOLUS - 30 units/kg (max bolus 4000 units)  As needed (PRN)      07/09/19 0312     heparin 25,000 units in dextrose 5% (100 units/ml) IV bolus from bag - ADDITIONAL PRN BOLUS - 60 units/kg (max bolus 4000 units)  As needed (PRN)      07/09/19 0312     Reason for No Pharmacological VTE Prophylaxis  Once      07/09/19 0312     IP VTE HIGH RISK PATIENT  Once      07/09/19 0312                Nara Asif NP  Department of Hospital Medicine   [facility]

## 2019-07-11 NOTE — PLAN OF CARE
Plan of care reviewed with pt. VS stable. No acute events at this time. No complaints. Pt uses personal CPAP @ HS. Plan for CTS to review images and plan for PCI v CABG. Pt remains free from falls or injury. Bed low and locked, call light and personal belongings within reach. Will continue to monitor.

## 2019-07-11 NOTE — ASSESSMENT & PLAN NOTE
NSTEMI pathway initiated with DAPT, low-intensity heparin infusion, Interventional cardiology consult.    - 0.289 peak

## 2019-07-11 NOTE — SUBJECTIVE & OBJECTIVE
Interval History: Gathering data on which is her best option and relinquishing her own short comings.    Review of Systems   Constitutional: Negative for activity change, appetite change, chills, fatigue and fever.   HENT: Negative for congestion, sore throat and trouble swallowing.    Eyes: Negative for redness and visual disturbance.   Respiratory: Positive for chest tightness (chest pressure) and shortness of breath. Negative for cough and wheezing.    Cardiovascular: Positive for leg swelling. Negative for chest pain and palpitations.   Gastrointestinal: Negative for abdominal pain, constipation, diarrhea, nausea and vomiting.   Endocrine: Negative for cold intolerance and heat intolerance.   Genitourinary: Negative for decreased urine volume, difficulty urinating and hematuria.   Musculoskeletal: Negative for back pain and myalgias.   Skin: Negative for color change, rash and wound.   Allergic/Immunologic: Negative for immunocompromised state.   Neurological: Negative for dizziness, weakness, light-headedness, numbness and headaches.   Hematological: Does not bruise/bleed easily.   Psychiatric/Behavioral: Negative for agitation, decreased concentration and sleep disturbance. The patient is not nervous/anxious.      Objective:     Vital Signs (Most Recent):  Temp: 98.3 °F (36.8 °C) (07/10/19 1925)  Pulse: (!) 49 (07/10/19 2309)  Resp: 18 (07/10/19 2106)  BP: 137/75 (07/10/19 1925)  SpO2: 98 % (07/10/19 2106) Vital Signs (24h Range):  Temp:  [97.8 °F (36.6 °C)-98.8 °F (37.1 °C)] 98.3 °F (36.8 °C)  Pulse:  [49-96] 49  Resp:  [16-18] 18  SpO2:  [93 %-98 %] 98 %  BP: (137-162)/(72-81) 137/75     Weight: 102.8 kg (226 lb 10.1 oz)  Body mass index is 41.45 kg/m².    Intake/Output Summary (Last 24 hours) at 7/10/2019 5230  Last data filed at 7/10/2019 1800  Gross per 24 hour   Intake 1230 ml   Output 1641 ml   Net -411 ml      Physical Exam   Constitutional: She is oriented to person, place, and time. She appears  well-developed and well-nourished. No distress.   HENT:   Head: Normocephalic and atraumatic.   Right Ear: External ear normal.   Left Ear: External ear normal.   Nose: Nose normal.   Eyes: Conjunctivae and EOM are normal.   Neck: Normal range of motion. Neck supple. No JVD present.   Cardiovascular: Normal rate, regular rhythm, normal heart sounds and intact distal pulses.   No murmur heard.  Pulmonary/Chest: Effort normal and breath sounds normal. No respiratory distress. She has no wheezes. She has no rales.   Abdominal: Soft. Bowel sounds are normal. She exhibits no distension and no mass. There is no tenderness. There is no guarding.   Musculoskeletal: Normal range of motion. She exhibits no edema.   Neurological: She is alert and oriented to person, place, and time. No cranial nerve deficit or sensory deficit. Coordination normal.   Skin: Skin is warm and dry. No rash noted. She is not diaphoretic. No erythema.   Psychiatric: She has a normal mood and affect. Her behavior is normal. Judgment and thought content normal.   Nursing note and vitals reviewed.      Significant Labs:   BMP:   Recent Labs   Lab 07/09/19  0328   *      K 4.2      CO2 24   BUN 16   CREATININE 1.1   CALCIUM 8.8     CBC:   Recent Labs   Lab 07/09/19  0328 07/09/19  2351   WBC 5.67 6.35   HGB 11.0* 10.4*   HCT 34.8* 32.2*    214     Recent Lab Results       07/10/19  1659   07/10/19  1135   07/10/19  0813   07/10/19  0522        aPTT       22.3  Comment:  aPTT therapeutic range = 39-69 seconds     POCT Glucose 169 215 183             Significant Imaging:     [facility]: 7/9/19    Left Main   The vessel exhibits minimal luminal irregularities.   Left Anterior Descending   Mid LAD lesion is 80% stenosed.   Mid LAD to Dist LAD lesion is 80% stenosed.   First Diagonal Branch   There is mild diffuse disease throughout the vessel.   Second Diagonal Branch   The vessel exhibits minimal luminal irregularities.   Left Circumflex    There is moderate diffuse disease throughout the vessel.   First Obtuse Marginal Branch   1st Mrg lesion is 75% stenosed.   Right Coronary Artery   Prox RCA to Mid RCA lesion is 95% stenosed.   Dist RCA lesion is 60% stenosed.   Right Posterior Descending Artery   There is mild diffuse disease throughout the vessel.   First Right Posterolateral   The vessel exhibits minimal luminal irregularities.     Echo: 7/9/19  · Normal left ventricular systolic function. The estimated ejection fraction is 68%  · Concentric left ventricular remodeling.  · Normal LV diastolic function.  · No wall motion abnormalities.  · Normal right ventricular systolic function.

## 2019-07-11 NOTE — PROGRESS NOTES
[facility] Hospital Medicine  Progress Note    Patient Name: Kaylee Romero  MRN: [id number]  Patient Class: IP- Inpatient   Admission Date: 7/9/2019  Length of Stay: 2 days  Attending Physician: Amy Oconnell MD  Primary Care Provider: Liz Roberts MD    Hospital Medicine Team: Networked reference to record PCT  Nara Asif NP    Subjective:     Principal Problem:NSTEMI (non-ST elevated myocardial infarction)      HPI:  74F presents as transfer from [facility] for NSTEMI.  She went to the ED there Sunday after experiencing severe left-sided chest pressure radiating to her arm which occurred suddenly before she was about to go to [employer].  She went inside and took a 500mg ASA and it gradually subsided.  She had a similar event several days earlier which similarly resolved but didn't seek care for it then, not thinking that it could be cardiac in origin.  There her initial troponin was 0.03, which went up to 0.3.  She was given ASA and plavix, but no heparin.  There are no cardiology services there so she was transferred here for further management.  Her cardiologist Dr Henderson is here.      Overview/Hospital Course:  Admitted to hospital medicine for NSTEMI, found to have multi vessel disease, HTN, uncontrolled DM2 7.6/171.  S/p C with multi vessel dz, Ticagrelor stopped in anticipation of CTS.  Dr. Ventura pending final consult, who may possibly deferring d/t multiple co morbidities, awaiting final say.  She's having intermittent CP relieved by SL ntg.      Dispo: +/- high risk pci vs CTS, having hard time sitting upright without using her arms.     No new subjective & objective note has been filed under this hospital service since the last note was generated.      Assessment/Plan:      * NSTEMI (non-ST elevated myocardial infarction)  NSTEMI pathway initiated with DAPT, low-intensity heparin infusion, Interventional cardiology consult.  Will recheck troponin to  make sure not continuing upward trend.      Type 2 diabetes mellitus with stage 3 chronic kidney disease, without long-term current use of insulin  Patient on diraglutide injectection qweekly (missed yesterday) and daily sulfonylurea.  Counseled patient that we don't use these agents in the hospital for safety reasons and instead use insulin, which she did not like hearing but after further discussion (and some stink eye) she agreed with the agreement that this only for inpatient use and that she would be resuming her outpatient medications upon discharge.  Last HbA1c 8.0; will recheck.  Patient NPO for [facility] today, will put on low-dose SSI only for now but once eating again would probably benefit from levemir 10 units as well.      CKD (chronic kidney disease) stage 3, GFR 30-59 ml/min  Dose renally-cleared medications accordingly.        KAMRAN on CPAP  Will order CPAP qhs      Morbid obesity with body mass index (BMI) of 40.0 or higher  Nutrition and cardiac rehab referrals would probably also yield benefit in weight loss improving BP control, improved diabetes control, improved cardiovascular outcomes, etc.      Hyperlipidemia  Will increase statin from 10mg to 40mg in context of ACS.      Hypertensive heart disease with diastolic heart failure  2DE as noted above  - ARB recalled; will replace with losartan 100mg titrated to candesartan 32mg   - chlorthalidone 25  - metoprolol 25 bid  - amlodipine 10        VTE Risk Mitigation (From admission, onward)        Ordered     heparin 25,000 units in dextrose 5% 250 mL (100 units/mL) infusion LOW INTENSITY nomogram - [facility]  Continuous      07/09/19 0312     heparin 25,000 units in dextrose 5% (100 units/ml) IV bolus from bag - ADDITIONAL PRN BOLUS - 30 units/kg (max bolus 4000 units)  As needed (PRN)      07/09/19 0312     heparin 25,000 units in dextrose 5% (100 units/ml) IV bolus from bag - ADDITIONAL PRN BOLUS - 60 units/kg (max bolus 4000 units)  As needed (PRN)       07/09/19 0312     Reason for No Pharmacological VTE Prophylaxis  Once      07/09/19 0312     IP VTE HIGH RISK PATIENT  Once      07/09/19 0312                Nara Asif NP  Department of Hospital Medicine   [facility]

## 2019-07-11 NOTE — SUBJECTIVE & OBJECTIVE
Interval History: Gathering data on which is her best option and relinquishing her own short comings.    Review of Systems   Constitutional: Negative for activity change, appetite change, chills, fatigue and fever.   HENT: Negative for congestion, sore throat and trouble swallowing.    Eyes: Negative for redness and visual disturbance.   Respiratory: Positive for chest tightness (chest pressure) and shortness of breath. Negative for cough and wheezing.    Cardiovascular: Positive for leg swelling. Negative for chest pain and palpitations.   Gastrointestinal: Negative for abdominal pain, constipation, diarrhea, nausea and vomiting.   Endocrine: Negative for cold intolerance and heat intolerance.   Genitourinary: Negative for decreased urine volume, difficulty urinating and hematuria.   Musculoskeletal: Negative for back pain and myalgias.   Skin: Negative for color change, rash and wound.   Allergic/Immunologic: Negative for immunocompromised state.   Neurological: Negative for dizziness, weakness, light-headedness, numbness and headaches.   Hematological: Does not bruise/bleed easily.   Psychiatric/Behavioral: Negative for agitation, decreased concentration and sleep disturbance. The patient is not nervous/anxious.      Objective:     Vital Signs (Most Recent):  Temp: 96.3 °F (35.7 °C) (07/11/19 1134)  Pulse: 80 (07/11/19 1134)  Resp: 20 (07/11/19 1134)  BP: (!) 146/76 (07/11/19 1134)  SpO2: 98 % (07/11/19 1134) Vital Signs (24h Range):  Temp:  [96.3 °F (35.7 °C)-98.3 °F (36.8 °C)] 96.3 °F (35.7 °C)  Pulse:  [49-91] 80  Resp:  [16-20] 20  SpO2:  [95 %-98 %] 98 %  BP: (121-146)/(74-78) 146/76     Weight: 103.1 kg (227 lb 4.7 oz)  Body mass index is 41.57 kg/m².    Intake/Output Summary (Last 24 hours) at 7/11/2019 1425  Last data filed at 7/11/2019 1100  Gross per 24 hour   Intake 250 ml   Output 701 ml   Net -451 ml      Physical Exam   Constitutional: She is oriented to person, place, and time. She appears  well-developed and well-nourished. No distress.   HENT:   Head: Normocephalic and atraumatic.   Right Ear: External ear normal.   Left Ear: External ear normal.   Nose: Nose normal.   Eyes: Conjunctivae and EOM are normal.   Neck: Normal range of motion. Neck supple. No JVD present.   Cardiovascular: Normal rate, regular rhythm and intact distal pulses.   Murmur heard.  Pulmonary/Chest: Effort normal and breath sounds normal. No respiratory distress. She has no wheezes. She has no rales.   Abdominal: Soft. Bowel sounds are normal. She exhibits no distension and no mass. There is no tenderness. There is no guarding.   Musculoskeletal: Normal range of motion. She exhibits no edema.   Neurological: She is alert and oriented to person, place, and time. No cranial nerve deficit or sensory deficit. Coordination normal.   Skin: Skin is warm and dry. No rash noted. She is not diaphoretic. No erythema.   Psychiatric: She has a normal mood and affect. Her behavior is normal. Judgment and thought content normal.   Nursing note and vitals reviewed.      Significant Labs:   BMP:   Recent Labs   Lab 07/11/19  0923   *   *   K 4.4      CO2 18*   BUN 19   CREATININE 1.2   CALCIUM 8.7   MG 1.8     CBC:   Recent Labs   Lab 07/09/19  2351   WBC 6.35   HGB 10.4*   HCT 32.2*        Recent Lab Results       07/11/19  1121   07/11/19  1120   07/11/19  0923   07/11/19  0331   07/10/19  1659        Anion Gap     10         aPTT 28.8  Comment:  aPTT therapeutic range = 39-69 seconds     23.2  Comment:  aPTT therapeutic range = 39-69 seconds       BUN, Bld     19         Calcium     8.7         Chloride     104         CO2     18         Creatinine     1.2         eGFR if      52.2         eGFR if non      45.3  Comment:  Calculation used to obtain the estimated glomerular filtration  rate (eGFR) is the CKD-EPI equation.            Glucose     278         Magnesium     1.8         POCT  Glucose   320     169     Potassium     4.4         Sodium     132                              Significant Imaging:     [facility]: 7/9/19    Left Main   The vessel exhibits minimal luminal irregularities.   Left Anterior Descending   Mid LAD lesion is 80% stenosed.   Mid LAD to Dist LAD lesion is 80% stenosed.   First Diagonal Branch   There is mild diffuse disease throughout the vessel.   Second Diagonal Branch   The vessel exhibits minimal luminal irregularities.   Left Circumflex   There is moderate diffuse disease throughout the vessel.   First Obtuse Marginal Branch   1st Mrg lesion is 75% stenosed.   Right Coronary Artery   Prox RCA to Mid RCA lesion is 95% stenosed.   Dist RCA lesion is 60% stenosed.   Right Posterior Descending Artery   There is mild diffuse disease throughout the vessel.   First Right Posterolateral   The vessel exhibits minimal luminal irregularities.     Echo: 7/9/19  · Normal left ventricular systolic function. The estimated ejection fraction is 68%  · Concentric left ventricular remodeling.  · Normal LV diastolic function.  · No wall motion abnormalities.  · Normal right ventricular systolic function.

## 2019-07-11 NOTE — ASSESSMENT & PLAN NOTE
2DE as noted above  - ARB recalled; will replace with losartan 100mg titrated to candesartan 32mg   - chlorthalidone 25  - metoprolol 25 bid  - amlodipine 10

## 2019-07-11 NOTE — PLAN OF CARE
07/09/19 1531   Discharge Assessment   Assessment Type Discharge Planning Assessment   Confirmed/corrected address and phone number on facesheet? Yes   Assessment information obtained from? Patient;Caregiver;Medical Record   Expected Length of Stay (days) 3   Communicated expected length of stay with patient/caregiver yes   Prior to hospitilization cognitive status: Alert/Oriented   Prior to hospitalization functional status: Independent   Current cognitive status: Alert/Oriented   Current Functional Status: Independent   Facility Arrived From: [facility]   Lives With spouse;child(rebeca), adult   Able to Return to Prior Arrangements yes   Is patient able to care for self after discharge? Yes   Patient's perception of discharge disposition home or selfcare   Readmission Within the Last 30 Days no previous admission in last 30 days   Patient currently being followed by outpatient case management? No   Patient currently receives any other outside agency services? No   Equipment Currently Used at Home walker, rolling;cane, straight;CPAP   Do you have any problems affording any of your prescribed medications? No   Is the patient taking medications as prescribed? yes   Does the patient have transportation home? Yes   Transportation Anticipated family or friend will provide   Does the patient receive services at the [facility]? No   Discharge Plan A Home with family   DME Needed Upon Discharge  none   Patient/Family in Agreement with Plan yes   Transferred from [facility] with NSTEMI. Lives with , son and daughter in law. Independent in her ADLs. Plan is to DC home. No DC needs identified.

## 2019-07-11 NOTE — HOSPITAL COURSE
Admitted to hospital medicine for NSTEMI, found to have multi vessel disease, HTN, uncontrolled DM2 7.6/171.  S/p LHC with multi vessel dz.  CTS/ Dr. Ventura consulted + targets but high risk d/t multiple co morbidities; frail and debility.  Dr. Gibbons also available to perform high risk PCI.  Patient was informed of both options and would like to go home and think about her options and f/u with both Shai and Audrey in clinic.  CM to make appt's for both clinics along with amb referrals sent.       As for her uncontrolled DM, she is maxed out on her home regimen, Jardiance is $121 per month, and she already pays out of pocket for her trulicity and other meds for her .  We will send her home on levemir, which is the cheapest option at current time.  B/p better controlled.      Dispo: home no needs  F/u Audrey/ Shai for final revasc plan

## 2019-07-11 NOTE — PROGRESS NOTES
[facility] Hospital Medicine  Progress Note    Patient Name: Kaylee Romero  MRN: [id number]  Patient Class: IP- Inpatient   Admission Date: 7/9/2019  Length of Stay: 2 days  Attending Physician: Amy Oconnell MD  Primary Care Provider: Liz Roberts MD    Hospital Medicine Team: Networked reference to record PCT  Nara Asif NP    Subjective:     Principal Problem:NSTEMI (non-ST elevated myocardial infarction)      HPI:  74F presents as transfer from [facility] for NSTEMI.  She went to the ED there Sunday after experiencing severe left-sided chest pressure radiating to her arm which occurred suddenly before she was about to go to [facility].  She went inside and took a 500mg ASA and it gradually subsided.  She had a similar event several days earlier which similarly resolved but didn't seek care for it then, not thinking that it could be cardiac in origin.  There her initial troponin was 0.03, which went up to 0.3.  She was given ASA and plavix, but no heparin.  There are no cardiology services there so she was transferred here for further management.  Her cardiologist Dr Henderson is here.      Overview/Hospital Course:  Admitted to hospital medicine for NSTEMI, found to have multi vessel disease, HTN, uncontrolled DM2 7.6/171.  S/p C with multi vessel dz, Ticagrelor stopped in anticipation of CTS.  Dr. Ventura consulted + targets but deferring d/t multiple co morbidities; frail and debility.  Dr. Gibbons to perform high risk PCI.  She's having intermittent CP relieved by SL ntg.      Dispo: after PCI, Saturday, no needs      Interval History: Gathering data on which is her best option and relinquishing her own short comings.    Review of Systems   Constitutional: Negative for activity change, appetite change, chills, fatigue and fever.   HENT: Negative for congestion, sore throat and trouble swallowing.    Eyes: Negative for redness and visual disturbance.   Respiratory:  Positive for chest tightness (chest pressure) and shortness of breath. Negative for cough and wheezing.    Cardiovascular: Positive for leg swelling. Negative for chest pain and palpitations.   Gastrointestinal: Negative for abdominal pain, constipation, diarrhea, nausea and vomiting.   Endocrine: Negative for cold intolerance and heat intolerance.   Genitourinary: Negative for decreased urine volume, difficulty urinating and hematuria.   Musculoskeletal: Negative for back pain and myalgias.   Skin: Negative for color change, rash and wound.   Allergic/Immunologic: Negative for immunocompromised state.   Neurological: Negative for dizziness, weakness, light-headedness, numbness and headaches.   Hematological: Does not bruise/bleed easily.   Psychiatric/Behavioral: Negative for agitation, decreased concentration and sleep disturbance. The patient is not nervous/anxious.      Objective:     Vital Signs (Most Recent):  Temp: 96.3 °F (35.7 °C) (07/11/19 1134)  Pulse: 80 (07/11/19 1134)  Resp: 20 (07/11/19 1134)  BP: (!) 146/76 (07/11/19 1134)  SpO2: 98 % (07/11/19 1134) Vital Signs (24h Range):  Temp:  [96.3 °F (35.7 °C)-98.3 °F (36.8 °C)] 96.3 °F (35.7 °C)  Pulse:  [49-91] 80  Resp:  [16-20] 20  SpO2:  [95 %-98 %] 98 %  BP: (121-146)/(74-78) 146/76     Weight: 103.1 kg (227 lb 4.7 oz)  Body mass index is 41.57 kg/m².    Intake/Output Summary (Last 24 hours) at 7/11/2019 1425  Last data filed at 7/11/2019 1100  Gross per 24 hour   Intake 250 ml   Output 701 ml   Net -451 ml      Physical Exam   Constitutional: She is oriented to person, place, and time. She appears well-developed and well-nourished. No distress.   HENT:   Head: Normocephalic and atraumatic.   Right Ear: External ear normal.   Left Ear: External ear normal.   Nose: Nose normal.   Eyes: Conjunctivae and EOM are normal.   Neck: Normal range of motion. Neck supple. No JVD present.   Cardiovascular: Normal rate, regular rhythm and intact distal pulses.    Murmur heard.  Pulmonary/Chest: Effort normal and breath sounds normal. No respiratory distress. She has no wheezes. She has no rales.   Abdominal: Soft. Bowel sounds are normal. She exhibits no distension and no mass. There is no tenderness. There is no guarding.   Musculoskeletal: Normal range of motion. She exhibits no edema.   Neurological: She is alert and oriented to person, place, and time. No cranial nerve deficit or sensory deficit. Coordination normal.   Skin: Skin is warm and dry. No rash noted. She is not diaphoretic. No erythema.   Psychiatric: She has a normal mood and affect. Her behavior is normal. Judgment and thought content normal.   Nursing note and vitals reviewed.      Significant Labs:   BMP:   Recent Labs   Lab 07/11/19  0923   *   *   K 4.4      CO2 18*   BUN 19   CREATININE 1.2   CALCIUM 8.7   MG 1.8     CBC:   Recent Labs   Lab 07/09/19  2351   WBC 6.35   HGB 10.4*   HCT 32.2*        Recent Lab Results       07/11/19  1121   07/11/19  1120   07/11/19  0923   07/11/19  0331   07/10/19  1659        Anion Gap     10         aPTT 28.8  Comment:  aPTT therapeutic range = 39-69 seconds     23.2  Comment:  aPTT therapeutic range = 39-69 seconds       BUN, Bld     19         Calcium     8.7         Chloride     104         CO2     18         Creatinine     1.2         eGFR if      52.2         eGFR if non      45.3  Comment:  Calculation used to obtain the estimated glomerular filtration  rate (eGFR) is the CKD-EPI equation.            Glucose     278         Magnesium     1.8         POCT Glucose   320     169     Potassium     4.4         Sodium     132                              Significant Imaging:     [facility]: 7/9/19    Left Main   The vessel exhibits minimal luminal irregularities.   Left Anterior Descending   Mid LAD lesion is 80% stenosed.   Mid LAD to Dist LAD lesion is 80% stenosed.   First Diagonal Branch   There is mild diffuse  disease throughout the vessel.   Second Diagonal Branch   The vessel exhibits minimal luminal irregularities.   Left Circumflex   There is moderate diffuse disease throughout the vessel.   First Obtuse Marginal Branch   1st Mrg lesion is 75% stenosed.   Right Coronary Artery   Prox RCA to Mid RCA lesion is 95% stenosed.   Dist RCA lesion is 60% stenosed.   Right Posterior Descending Artery   There is mild diffuse disease throughout the vessel.   First Right Posterolateral   The vessel exhibits minimal luminal irregularities.     Echo: 7/9/19  · Normal left ventricular systolic function. The estimated ejection fraction is 68%  · Concentric left ventricular remodeling.  · Normal LV diastolic function.  · No wall motion abnormalities.  · Normal right ventricular systolic function.           Assessment/Plan:      * NSTEMI (non-ST elevated myocardial infarction)  NSTEMI pathway initiated with DAPT, low-intensity heparin infusion, Interventional cardiology consult.    - 0.289 peak      Type 2 diabetes mellitus with stage 3 chronic kidney disease, without long-term current use of insulin  Patient on diraglutide injection qweekly (missed day before admit) and daily sulfonylurea.  Counseled patient that we don't use these agents in the hospital for safety reasons and instead use insulin, which she did not like hearing but after further discussion she agreed with the agreement that this only for inpatient use and that she would be resuming her outpatient medications upon discharge.  Last HbA1c 8.0; recheck 7.6.   - resume levemir 10 units.   - changed SSI to moderate scale      CKD (chronic kidney disease) stage 3, GFR 30-59 ml/min  Dose renally-cleared medications accordingly.        KAMRAN on CPAP  Has home CPAP machine    Morbid obesity with body mass index (BMI) of 40.0 or higher  Nutrition and cardiac rehab referrals would probably also yield benefit in weight loss improving BP control, improved diabetes control, improved  cardiovascular outcomes, etc.      Hyperlipidemia  Will increase statin from 10mg to 40mg in context of ACS.      Hypertensive heart disease with diastolic heart failure  2DE as noted above  - Home ARB recalled and not available in house; replaced with losartan 100mg, poor response, changed to candesartan 32mg   - chlorthalidone 25  - metoprolol 25 bid  - amlodipine 10, b/p still elevated normal EF, normal DDx, will change to nifedipine tomorrow as she's gotten a dose today and do not want to drop pressures before [facility].         VTE Risk Mitigation (From admission, onward)        Ordered     Reason for No Pharmacological VTE Prophylaxis  Once      07/09/19 0312     IP VTE HIGH RISK PATIENT  Once      07/09/19 0312                Nara Asif NP  Department of Hospital Medicine   [facility]

## 2019-07-11 NOTE — ASSESSMENT & PLAN NOTE
Patient on diraglutide injection qweekly (missed day before admit) and daily sulfonylurea.  Counseled patient that we don't use these agents in the hospital for safety reasons and instead use insulin, which she did not like hearing but after further discussion she agreed with the agreement that this only for inpatient use and that she would be resuming her outpatient medications upon discharge.  Last HbA1c 8.0; recheck 7.6.   - resume levemir 10 units.   - changed SSI to moderate scale

## 2019-07-11 NOTE — ASSESSMENT & PLAN NOTE
2DE as noted above  - Home ARB recalled and not available in house; replaced with losartan 100mg, poor response, changed to candesartan 32mg   - chlorthalidone 25  - metoprolol 25 bid  - amlodipine 10, b/p still elevated normal EF, normal DDx, will change to nifedipine tomorrow as she's gotten a dose today and do not want to drop pressures before [facility].

## 2019-07-12 VITALS
OXYGEN SATURATION: 97 % | TEMPERATURE: 97 F | HEART RATE: 78 BPM | RESPIRATION RATE: 18 BRPM | WEIGHT: 227.31 LBS | BODY MASS INDEX: 41.83 KG/M2 | SYSTOLIC BLOOD PRESSURE: 135 MMHG | HEIGHT: 62 IN | DIASTOLIC BLOOD PRESSURE: 73 MMHG

## 2019-07-12 LAB
POCT GLUCOSE: 181 MG/DL (ref 70–110)
POCT GLUCOSE: 194 MG/DL (ref 70–110)

## 2019-07-12 PROCEDURE — 25000003 PHARM REV CODE 250: Performed by: HOSPITALIST

## 2019-07-12 PROCEDURE — 27000221 HC OXYGEN, UP TO 24 HOURS

## 2019-07-12 PROCEDURE — 99239 HOSP IP/OBS DSCHRG MGMT >30: CPT | Mod: ,,, | Performed by: NURSE PRACTITIONER

## 2019-07-12 PROCEDURE — 94761 N-INVAS EAR/PLS OXIMETRY MLT: CPT

## 2019-07-12 PROCEDURE — 25000003 PHARM REV CODE 250: Performed by: NURSE PRACTITIONER

## 2019-07-12 PROCEDURE — 99239 PR HOSPITAL DISCHARGE DAY,>30 MIN: ICD-10-PCS | Mod: ,,, | Performed by: NURSE PRACTITIONER

## 2019-07-12 PROCEDURE — 99900035 HC TECH TIME PER 15 MIN (STAT)

## 2019-07-12 RX ORDER — CARVEDILOL 6.25 MG/1
6.25 TABLET ORAL 2 TIMES DAILY
Qty: 180 TABLET | Refills: 0 | Status: ON HOLD | OUTPATIENT
Start: 2019-07-12 | End: 2019-08-21 | Stop reason: HOSPADM

## 2019-07-12 RX ORDER — ATORVASTATIN CALCIUM 80 MG/1
80 TABLET, FILM COATED ORAL DAILY
Qty: 90 TABLET | Refills: 3 | Status: SHIPPED | OUTPATIENT
Start: 2019-07-13 | End: 2020-02-03 | Stop reason: SDUPTHER

## 2019-07-12 RX ORDER — NIFEDIPINE 30 MG/1
30 TABLET, EXTENDED RELEASE ORAL DAILY
Qty: 90 TABLET | Refills: 0 | Status: ON HOLD | OUTPATIENT
Start: 2019-07-13 | End: 2019-08-21 | Stop reason: HOSPADM

## 2019-07-12 RX ORDER — PEN NEEDLE, DIABETIC 30 GX3/16"
1 NEEDLE, DISPOSABLE MISCELLANEOUS NIGHTLY
Qty: 100 EACH | Refills: 11 | Status: SHIPPED | OUTPATIENT
Start: 2019-07-12 | End: 2020-10-06 | Stop reason: SDUPTHER

## 2019-07-12 RX ORDER — NITROGLYCERIN 0.4 MG/1
0.4 TABLET SUBLINGUAL EVERY 5 MIN PRN
Qty: 25 TABLET | Refills: 3 | Status: ON HOLD | OUTPATIENT
Start: 2019-07-12 | End: 2019-08-21 | Stop reason: HOSPADM

## 2019-07-12 RX ADMIN — ATORVASTATIN CALCIUM 80 MG: 20 TABLET, FILM COATED ORAL at 09:07

## 2019-07-12 RX ADMIN — CARVEDILOL 6.25 MG: 6.25 TABLET, FILM COATED ORAL at 09:07

## 2019-07-12 RX ADMIN — ASPIRIN 81 MG: 81 TABLET, COATED ORAL at 09:07

## 2019-07-12 RX ADMIN — TICAGRELOR 90 MG: 90 TABLET ORAL at 09:07

## 2019-07-12 RX ADMIN — CANDESARTAN CILEXETIL 32 MG: 16 TABLET ORAL at 09:07

## 2019-07-12 RX ADMIN — CHLORTHALIDONE 25 MG: 25 TABLET ORAL at 09:07

## 2019-07-12 RX ADMIN — NIFEDIPINE 30 MG: 30 TABLET, FILM COATED, EXTENDED RELEASE ORAL at 09:07

## 2019-07-12 NOTE — PHYSICIAN QUERY
PT Name: Kaylee Romero  MR #: [id number]     Physician Query Form - Diabetic Condition Clarification     CDS/: Karla Saleem               Contact information:[email or web address]  This form is a permanent document in the medical record.     Query Date: July 12, 2019    By submitting this query, we are merely seeking further clarification of documentation to reflect the severity of illness of your patient. Please utilize your independent clinical judgment when addressing the question(s) below.    The Medical record reflects the following:     Indicators   Supporting Clinical Findings Location in Medical Record   x Diabetes uncontrolled documented Uncontrolled DM2     pn 7-9   x Lab Value(s), POCT glucose value(s) POCT glucose value=  109 to 320 Lab 7-9 to 7-12   x Treatment                                 Medication Insulin aspart subq prn    Insulin detemir subq nightly Mar 7-11    Other       Provider, please specify the meaning of the term uncontrolled:    [ x  ] Diabetes mellitus Type 2 with hyperglycemia   [   ] Other diabetes complication (please specify): ____________   [   ]  Clinically Undetermined       Please document in your progress notes daily for the duration of treatment until resolved, and include in your discharge summary.

## 2019-07-12 NOTE — PLAN OF CARE
Problem: Adult Inpatient Plan of Care  Goal: Plan of Care Review  Outcome: Ongoing (interventions implemented as appropriate)  Plan of care discussed with patient, no new questions at this time. VSS, denies SOB, no complaint of pain. Medical management for NSTEMI continued. Pt underevaluation for possible PCI or CABG. No significant events overnight. Pt wore CPAP and rested comfortably. Safety precautions taken, no falls/trauma during the shift. Will continue to monitor.

## 2019-07-12 NOTE — NURSING
Patient discharging home with spouse. Patient wheel-chaired to front entrance by transporter. Patient educated on low sodium/cardiac/renal diet, s/s of HF, insulin administration and sites, new medications, follow up appointments. Approximately 30 minutes spent going D/C instructions. Medications were delivered by [facility] pharmacy. IV flushed and discontinued.

## 2019-07-12 NOTE — ASSESSMENT & PLAN NOTE
Patient on diraglutide injection qweekly (missed day before admit) and daily sulfonylurea.  Counseled patient that we don't use these agents in the hospital for safety reasons and instead use insulin, which she did not like hearing but after further discussion she agreed with the agreement that this only for inpatient use and that she would be resuming her outpatient medications upon discharge.  Last HbA1c 8.0; recheck 7.6.   -  levemir 8 units added to home regimen along with trulicity and po glipizide.   - changed SSI to moderate scale

## 2019-07-12 NOTE — DISCHARGE SUMMARY
[facility] Hospital Medicine  Discharge Summary      Patient Name: Kaylee Romero  MRN: [id number]  Admission Date: 7/9/2019  Hospital Length of Stay: 3 days  Discharge Date and Time: 7/12/2019  3:10 PM  Attending Physician: Dr. Amy Oconnell   Discharging Provider: Nara Asif NP  Primary Care Provider: Liz Roberts MD  [facility] Medicine Team: [facility] HOSP MED J Nara Asif NP    HPI:   74F presents as transfer from [facility] for NSTEMI.  She went to the ED there Sunday after experiencing severe left-sided chest pressure radiating to her arm which occurred suddenly before she was about to go to [facility].  She went inside and took a 500mg ASA and it gradually subsided.  She had a similar event several days earlier which similarly resolved but didn't seek care for it then, not thinking that it could be cardiac in origin.  There her initial troponin was 0.03, which went up to 0.3.  She was given ASA and plavix, but no heparin.  There are no cardiology services there so she was transferred here for further management.  Her cardiologist Dr Henderson is here.      Procedure(s) (LRB):  Left heart cath (Left)      Hospital Course:   Admitted to hospital medicine for NSTEMI, found to have multi vessel disease, HTN, uncontrolled DM2 7.6/171.  S/p C with multi vessel dz.  CTS/ Dr. Ventura consulted + targets but high risk d/t multiple co morbidities; frail and debility.  Dr. Gibbons also available to perform high risk PCI.  Patient was informed of both options and would like to go home and think about her options and f/u with both Shai and Audrey in clinic.  CM to make appt's for both clinics along with amb referrals sent.       As for her uncontrolled DM, she is maxed out on her home regimen, Jardiance is $121 per month, and she already pays out of pocket for her trulicity and other meds for her .  We will send her home on levemir, which is the cheapest option at  current time.  B/p better controlled.      Dispo: home no needs  F/u Audrey/ Shai for final revasc plan     Consults:   Consults (From admission, onward)        Status Ordering Provider     Inpatient consult to Cardiac Rehab  Once     Provider:  (Not yet assigned)    Completed WAYLON ELLIS     Inpatient consult to Cardiothoracic Surgery  Once     Provider:  (Not yet assigned)    Completed CASSANDRA RAMIREZ     Inpatient consult to Interventional Cardiology  Once     Provider:  (Not yet assigned)    Completed WAYLON ELLIS     Inpatient consult to Registered Dietitian/Nutritionist  Once     Provider:  (Not yet assigned)    Completed WAYLON ELLIS     Inpatient consult to Social Work/Case Management  Once     Provider:  (Not yet assigned)    Acknowledged WAYLON ELLIS      Review of Systems   Constitutional: Negative for activity change, appetite change, chills, fatigue and fever.   HENT: Negative for congestion, sore throat and trouble swallowing.    Eyes: Negative for redness and visual disturbance.   Respiratory: Positive for chest tightness (chest pressure) and shortness of breath. Negative for cough and wheezing.    Cardiovascular: Positive for leg swelling. Negative for chest pain and palpitations.   Gastrointestinal: Negative for abdominal pain, constipation, diarrhea, nausea and vomiting.   Endocrine: Negative for cold intolerance and heat intolerance.   Genitourinary: Negative for decreased urine volume, difficulty urinating and hematuria.   Musculoskeletal: Negative for back pain and myalgias.   Skin: Negative for color change, rash and wound.   Allergic/Immunologic: Negative for immunocompromised state.   Neurological: Negative for dizziness, weakness, light-headedness, numbness and headaches.   Hematological: Does not bruise/bleed easily.   Psychiatric/Behavioral: Negative for agitation, decreased concentration and sleep disturbance. The patient is not nervous/anxious.       Physical Exam   Constitutional: She is oriented to person, place, and time. She appears well-developed and well-nourished. No distress.   HENT:   Head: Normocephalic and atraumatic.   Right Ear: External ear normal.   Left Ear: External ear normal.   Nose: Nose normal.   Eyes: Conjunctivae and EOM are normal.   Neck: Normal range of motion. Neck supple. No JVD present.   Cardiovascular: Normal rate, regular rhythm and intact distal pulses.   Murmur heard.  Pulmonary/Chest: Effort normal and breath sounds normal. No respiratory distress. She has no wheezes. She has no rales.   Abdominal: Soft. Bowel sounds are normal. She exhibits no distension and no mass. There is no tenderness. There is no guarding.   Musculoskeletal: Normal range of motion. She exhibits no edema.   Neurological: She is alert and oriented to person, place, and time. No cranial nerve deficit or sensory deficit. Coordination normal.   Skin: Skin is warm and dry. No rash noted. She is not diaphoretic. No erythema.   Psychiatric: She has a normal mood and affect. Her behavior is normal. Judgment and thought content normal.     * NSTEMI (non-ST elevated myocardial infarction)  NSTEMI pathway initiated with DAPT, low-intensity heparin infusion, Interventional cardiology consult.    - 0.289 peak  - cardiac rehab ordered, she does not want to go to [facility], (she used to work there)  - home on ticagrelor 90 bid until she decides which revasc option she would like to take  - coreg 6.25 bid  - nifedipine 30 qd  - chlorthalidone 25,   - diovan 320      Type 2 diabetes mellitus with stage 3 chronic kidney disease, without long-term current use of insulin  Patient on diraglutide injection qweekly (missed day before admit) and daily sulfonylurea.  Counseled patient that we don't use these agents in the hospital for safety reasons and instead use insulin, which she did not like hearing but after further discussion she agreed with the agreement that this only  for inpatient use and that she would be resuming her outpatient medications upon discharge.  Last HbA1c 8.0; recheck 7.6.   -  levemir 8 units added to home regimen along with trulicity and po glipizide.   - changed SSI to moderate scale      CKD (chronic kidney disease) stage 3, GFR 30-59 ml/min  Dose renally-cleared medications accordingly.        KAMRAN on CPAP  Has home CPAP machine    Morbid obesity with body mass index (BMI) of 40.0 or higher  Nutrition and cardiac rehab referrals would probably also yield benefit in weight loss improving BP control, improved diabetes control, improved cardiovascular outcomes, etc.      Hyperlipidemia  Will increase statin from 10mg to atorva 80mg in context of ACS.      Hypertensive heart disease with diastolic heart failure  - 2DE as noted above  - Home ARB recalled and not available in house; replaced with losartan 100mg, poor response, changed to candesartan 32mg   - chlorthalidone 25  - metoprolol 25 bid changed to coreg 6.25 mg bid  - on amlodipine 10 b/p still elevated with normal EF, normal DDx, changed to nifedipine 30mg    Type 2 diabetes mellitus with complication, without long-term current use of insulin-resolved as of 7/8/2019          Final Active Diagnoses:    Diagnosis Date Noted POA    PRINCIPAL PROBLEM:  NSTEMI (non-ST elevated myocardial infarction) [I21.4] 07/08/2019 Yes    Type 2 diabetes mellitus with stage 3 chronic kidney disease, without long-term current use of insulin [E11.22, N18.3] 05/16/2019 Yes    CKD (chronic kidney disease) stage 3, GFR 30-59 ml/min [N18.3] 04/20/2017 Yes    KAMRAN on CPAP [G47.33, Z99.89] 06/28/2016 Not Applicable    Morbid obesity with body mass index (BMI) of 40.0 or higher [E66.01] 05/09/2016 Yes    Hyperlipidemia [E78.5] 12/02/2014 Yes    Hypertensive heart disease with diastolic heart failure [I11.0, I50.30] 11/28/2012 Yes      Problems Resolved During this Admission:    Diagnosis Date Noted Date Resolved POA    Type 2  diabetes mellitus with complication, without long-term current use of insulin [E11.8] 05/09/2016 07/08/2019 Yes       Discharged Condition: good    Disposition: Home or Self Care    Follow Up:  Follow-up Information     Pop Henderson MD On 8/5/2019.    Specialty:  Cardiology  Contact information:  [address]  [facility] [address]  [phone]             Peterson Viera MD.    Specialty:  Cardiothoracic Surgery  Why:  Call Monday to schedule appointment on Wednesday  Contact information:  [address]  [facility] [address]  [phone]                 Patient Instructions:      Ambulatory referral to Cardiac Rehab   Referral Priority: Routine Referral Type: Consultation   Referral Reason: Specialty Services Required   Requested Specialty: Cardiac Rehabilitation   Number of Visits Requested: 1     Ambulatory consult to Cardiothoracic Surgery   Referral Priority: Routine Referral Type: Surgical   Referral Reason: Specialty Services Required   Referred to Provider: PETERSON VIERA Requested Specialty: Cardiothoracic Surgery   Number of Visits Requested: 1     Ambulatory referral to Cardiology   Referral Priority: Routine Referral Type: Consultation   Referral Reason: Specialty Services Required   Referred to Provider: MARINO ELI Requested Specialty: INTERVENTIONAL CARDIOLOGY   Number of Visits Requested: 1     Notify your health care provider if you experience any of the following:  temperature >100.4     Notify your health care provider if you experience any of the following:  persistent nausea and vomiting or diarrhea     Notify your health care provider if you experience any of the following:  severe uncontrolled pain     Notify your health care provider if you experience any of the following:  redness, tenderness, or signs of infection (pain, swelling, redness, odor or green/yellow discharge around incision site)     Notify your health care provider if you experience any of the  "following:  difficulty breathing or increased cough     Notify your health care provider if you experience any of the following:  severe persistent headache     Notify your health care provider if you experience any of the following:  worsening rash     Notify your health care provider if you experience any of the following:  persistent dizziness, light-headedness, or visual disturbances     Notify your health care provider if you experience any of the following:  increased confusion or weakness     Cardiac rehab phase ii   Standing Status: Future Standing Exp. Date: 07/12/20   Scheduling Instructions: Has triple vessel dz that needs to be fixed first either pci or cabg     Order Specific Question Answer Comments   Department [facility] CARDIAC REHAB    Select qualifying diagnosis: I21.4 - Non-ST elevation (NSTEMI) myocardial infarction      Activity as tolerated       Significant Diagnostic Studies: Labs:   CMP   Recent Labs   Lab 07/11/19  0923 07/11/19  1855   * 136   K 4.4 4.1    102   CO2 18* 23   * 201*   BUN 19 22   CREATININE 1.2 1.4   CALCIUM 8.7 9.3   ANIONGAP 10 11   ESTGFRAFRICA 52.2* 43.3*   EGFRNONAA 45.3* 37.6*    and CBC   Recent Labs   Lab 07/11/19  1855   WBC 7.52   HGB 12.8   HCT 40.1          Pending Diagnostic Studies:     None         Medications:  Reconciled Home Medications:      Medication List      START taking these medications    atorvastatin 80 MG tablet  Commonly known as:  LIPITOR  Take 1 tablet (80 mg total) by mouth once daily.  Start taking on:  7/13/2019     BD ULTRA-FINE SHORT PEN NEEDLE 31 gauge x 5/16" Ndle  Generic drug:  pen needle, diabetic  Use every evening.     BRILINTA 90 mg tablet  Generic drug:  ticagrelor  Take 1 tablet (90 mg total) by mouth 2 (two) times daily.     carvedilol 6.25 MG tablet  Commonly known as:  COREG  Take 1 tablet (6.25 mg total) by mouth 2 (two) times daily.     LEVEMIR FLEXTOUCH U-100 INSULN 100 unit/mL (3 mL) Inpn pen  Generic " drug:  insulin detemir U-100  Inject 8 Units into the skin every evening.     NIFEdipine 30 MG (OSM) 24 hr tablet  Commonly known as:  PROCARDIA-XL  Take 1 tablet (30 mg total) by mouth once daily.  Start taking on:  7/13/2019     nitroGLYCERIN 0.4 MG SL tablet  Commonly known as:  NITROSTAT  Place 1 tablet (0.4 mg total) under the tongue every 5 (five) minutes as needed for Chest pain (angina).        CONTINUE taking these medications    alcohol swabs Padm  Commonly known as:  ALCOHOL PADS  Apply 1 each topically as needed.     alendronate 70 MG tablet  Commonly known as:  FOSAMAX  Take 1 tablet (70 mg total) by mouth every 7 days. Take with a full glass of water & remain upright for at least 30 minutes     aspirin 81 MG EC tablet  Commonly known as:  ECOTRIN  Take 1 tablet (81 mg total) by mouth once daily.     blood sugar diagnostic Strp  1 strip by Misc.(Non-Drug; Combo Route) route once daily.     blood-glucose meter kit  Use as instructed     chlorthalidone 25 MG Tab  Commonly known as:  HYGROTEN  Take 1 tablet (25 mg total) by mouth once daily.     glipiZIDE 10 MG Tr24  Commonly known as:  GLUCOTROL  Take 2 tablets (20 mg total) by mouth daily with breakfast.     lancets Misc  1 lancet by Misc.(Non-Drug; Combo Route) route once daily.     ONE DAILY MULTIVITAMIN per tablet  Generic drug:  multivitamin  Take 1 tablet by mouth once daily.     potassium chloride 10 MEQ Cpsr  Commonly known as:  MICRO-K  Take 10 mEq by mouth once.     TRULICITY 1.5 mg/0.5 mL Pnij  Generic drug:  dulaglutide  INJECT 1.5 MG UNDER THE SKIN ONCE A WEEK     valsartan 320 MG tablet  Commonly known as:  DIOVAN  Take 1 tablet (320 mg total) by mouth once daily.        STOP taking these medications    amLODIPine 10 MG tablet  Commonly known as:  NORVASC     simvastatin 10 MG tablet  Commonly known as:  ZOCOR     vitamin D 1000 units Tab  Commonly known as:  VITAMIN D3            Indwelling Lines/Drains at time of discharge:    Lines/Drains/Airways          None          Time spent on the discharge of patient: 55 minutes  Patient was seen and examined on the date of discharge and determined to be suitable for discharge.         Nara Asif NP  Department of Hospital Medicine  [facility]  Spectra:  [phone]  Pager: [phone]

## 2019-07-12 NOTE — ASSESSMENT & PLAN NOTE
NSTEMI pathway initiated with DAPT, low-intensity heparin infusion, Interventional cardiology consult.    - 0.289 peak  - cardiac rehab ordered, she does not want to go to [facility], (she used to work there)  - home on ticagrelor 90 bid until she decides which revasc option she would like to take  - coreg 6.25 bid  - nifedipine 30 qd  - chlorthalidone 25,   - diovan 320

## 2019-07-12 NOTE — ASSESSMENT & PLAN NOTE
- 2DE as noted above  - Home ARB recalled and not available in house; replaced with losartan 100mg, poor response, changed to candesartan 32mg   - chlorthalidone 25  - metoprolol 25 bid changed to coreg 6.25 mg bid  - on amlodipine 10 b/p still elevated with normal EF, normal DDx, changed to nifedipine 30mg

## 2019-07-15 NOTE — PLAN OF CARE
07/15/19 0704   Final Note   Assessment Type Final Discharge Note   Anticipated Discharge Disposition Home   Hospital Follow Up  Appt(s) scheduled? Yes

## 2019-07-16 ENCOUNTER — PATIENT OUTREACH (OUTPATIENT)
Dept: OTHER | Facility: OTHER | Age: 74
End: 2019-07-16

## 2019-07-16 ENCOUNTER — TELEPHONE (OUTPATIENT)
Dept: CARDIOTHORACIC SURGERY | Facility: CLINIC | Age: 74
End: 2019-07-16

## 2019-07-16 NOTE — PROGRESS NOTES
"Last 5 Patient Entered Readings                                      Current 30 Day Average: 137/82     Recent Readings 7/17/2019 7/16/2019 7/4/2019 6/30/2019 6/14/2019    SBP (mmHg) 138 134 125 149 144    DBP (mmHg) 83 89 63 94 75    Pulse 87 86 104 90 105        7/16: Spoke to patient after d/c from hospital for NSTEMI.  Pt elected discharge to think about her options and has f/u with Cardiology.  She denies CP but c/o SOB, worse with exertion.  BP is better controlled on regimen adjusted in the hospital.  Encouraged pt to take all meds as prescribed and take daily readings.    Hypertension Medications             carvedilol (COREG) 6.25 MG tablet Take 1 tablet (6.25 mg total) by mouth 2 (two) times daily.    chlorthalidone (HYGROTEN) 25 MG Tab Take 1 tablet (25 mg total) by mouth once daily.    NIFEdipine (PROCARDIA-XL) 30 MG (OSM) 24 hr tablet Take 1 tablet (30 mg total) by mouth once daily.    nitroGLYCERIN (NITROSTAT) 0.4 MG SL tablet Place 1 tablet (0.4 mg total) under the tongue every 5 (five) minutes as needed for Chest pain (angina).    valsartan (DIOVAN) 320 MG tablet Take 1 tablet (320 mg total) by mouth once daily.        Last 6 Patient Entered Readings                                          Most Recent A1c: 7.6% on 7/9/2019  (Goal: 8%)     Recent Readings 7/17/2019 7/16/2019 7/13/2019 7/4/2019 6/30/2019    Blood Glucose (mg/dL) 148 101 246 171 145        7/16:Spoke to patient after d/c from hospital for NSTEMI. A1c 7.6%, down from 8.0 two months ago.  Started on Levemir as DC note stated "most affordable option" for patient.  Pt states that she is taking medication as prescribed.  States that her  administers the insulin every evening.  Monitor closely while on insulin and glipizide.      Diabetes Medications             glipiZIDE (GLUCOTROL) 10 MG TR24 Take 2 tablets (20 mg total) by mouth daily with breakfast.    insulin detemir U-100 (LEVEMIR FLEXTOUCH) 100 unit/mL (3 mL) SubQ InPn pen " Inject 8 Units into the skin every evening.    TRULICITY 1.5 mg/0.5 mL PnIj INJECT 1.5 MG UNDER THE SKIN ONCE A WEEK

## 2019-07-17 ENCOUNTER — TELEPHONE (OUTPATIENT)
Dept: PRIMARY CARE CLINIC | Facility: CLINIC | Age: 74
End: 2019-07-17

## 2019-07-17 NOTE — TELEPHONE ENCOUNTER
----- Message from Roma Hoffman MA sent at 7/17/2019  8:37 AM CDT -----  Per [employer]    Kaylee Romero MRN [id number]  patient discharged home 7/12/19 dx: NSTEMI discharge summary indicates member was changed to Candesartan 32 mg daily however no script was sent to pharmacy and patient still listed as taking Valsartan 320 mg daily. Please call patient to clarify

## 2019-07-17 NOTE — TELEPHONE ENCOUNTER
Pt confirmed all medications she is taking she was taken off the candesartan 32 mg by [facility] cardiology Nara Asif NP on 7/12/19 while pt was in the hospital   Pt is asking can she take cod liver oil 415 mg 3 pills daily

## 2019-07-17 NOTE — TELEPHONE ENCOUNTER
Patient is to continue Valsartan. Candesartan was never prescribed.   No to the cod liver oil. BRING ALL MEDS BOTTLES to follow up visits with me and Cardiology (she tends to be confused about meds).

## 2019-07-22 ENCOUNTER — PATIENT MESSAGE (OUTPATIENT)
Dept: OTHER | Facility: OTHER | Age: 74
End: 2019-07-22

## 2019-07-24 ENCOUNTER — OFFICE VISIT (OUTPATIENT)
Dept: CARDIOTHORACIC SURGERY | Facility: CLINIC | Age: 74
End: 2019-07-24
Payer: MEDICARE

## 2019-07-24 ENCOUNTER — TELEPHONE (OUTPATIENT)
Dept: PRIMARY CARE CLINIC | Facility: CLINIC | Age: 74
End: 2019-07-24

## 2019-07-24 VITALS
WEIGHT: 222.69 LBS | SYSTOLIC BLOOD PRESSURE: 141 MMHG | BODY MASS INDEX: 40.98 KG/M2 | DIASTOLIC BLOOD PRESSURE: 91 MMHG | HEIGHT: 62 IN | OXYGEN SATURATION: 100 % | HEART RATE: 94 BPM | TEMPERATURE: 98 F

## 2019-07-24 DIAGNOSIS — I21.4 NSTEMI (NON-ST ELEVATED MYOCARDIAL INFARCTION): Primary | ICD-10-CM

## 2019-07-24 PROCEDURE — 3080F PR MOST RECENT DIASTOLIC BLOOD PRESSURE >= 90 MM HG: ICD-10-PCS | Mod: CPTII,S$GLB,, | Performed by: THORACIC SURGERY (CARDIOTHORACIC VASCULAR SURGERY)

## 2019-07-24 PROCEDURE — 99999 PR PBB SHADOW E&M-EST. PATIENT-LVL V: ICD-10-PCS | Mod: PBBFAC,,, | Performed by: THORACIC SURGERY (CARDIOTHORACIC VASCULAR SURGERY)

## 2019-07-24 PROCEDURE — 1101F PR PT FALLS ASSESS DOC 0-1 FALLS W/OUT INJ PAST YR: ICD-10-PCS | Mod: CPTII,S$GLB,, | Performed by: THORACIC SURGERY (CARDIOTHORACIC VASCULAR SURGERY)

## 2019-07-24 PROCEDURE — 99999 PR PBB SHADOW E&M-EST. PATIENT-LVL V: CPT | Mod: PBBFAC,,, | Performed by: THORACIC SURGERY (CARDIOTHORACIC VASCULAR SURGERY)

## 2019-07-24 PROCEDURE — 3077F SYST BP >= 140 MM HG: CPT | Mod: CPTII,S$GLB,, | Performed by: THORACIC SURGERY (CARDIOTHORACIC VASCULAR SURGERY)

## 2019-07-24 PROCEDURE — 99215 PR OFFICE/OUTPT VISIT, EST, LEVL V, 40-54 MIN: ICD-10-PCS | Mod: S$GLB,,, | Performed by: THORACIC SURGERY (CARDIOTHORACIC VASCULAR SURGERY)

## 2019-07-24 PROCEDURE — 99215 OFFICE O/P EST HI 40 MIN: CPT | Mod: S$GLB,,, | Performed by: THORACIC SURGERY (CARDIOTHORACIC VASCULAR SURGERY)

## 2019-07-24 PROCEDURE — 3077F PR MOST RECENT SYSTOLIC BLOOD PRESSURE >= 140 MM HG: ICD-10-PCS | Mod: CPTII,S$GLB,, | Performed by: THORACIC SURGERY (CARDIOTHORACIC VASCULAR SURGERY)

## 2019-07-24 PROCEDURE — 1101F PT FALLS ASSESS-DOCD LE1/YR: CPT | Mod: CPTII,S$GLB,, | Performed by: THORACIC SURGERY (CARDIOTHORACIC VASCULAR SURGERY)

## 2019-07-24 PROCEDURE — 3080F DIAST BP >= 90 MM HG: CPT | Mod: CPTII,S$GLB,, | Performed by: THORACIC SURGERY (CARDIOTHORACIC VASCULAR SURGERY)

## 2019-07-24 NOTE — TELEPHONE ENCOUNTER
I received an order to sign for patient assistance with Trulicity.   Last seen by me 3/19, last seen by Endo 5/19.   Has had a heart attack since then, and is scheduled for Triple Bypass surgery 8/12/19.     Also currently on Glipizide, and was recently started on Levemir insulin. Needs follow up for review of Diabetes Management on multiple meds prior to surgery. Currently scheduled to see me on 8/13/19, please move this up.

## 2019-07-24 NOTE — TELEPHONE ENCOUNTER
appt scheduled and mailed   Pt Is aware of date and time of appt with dr mayberry   She is advised to bring all diabetes med with her to the clinic
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
## 2019-07-26 ENCOUNTER — TELEPHONE (OUTPATIENT)
Dept: ENDOCRINOLOGY | Facility: CLINIC | Age: 74
End: 2019-07-26

## 2019-07-26 ENCOUNTER — PATIENT MESSAGE (OUTPATIENT)
Dept: ENDOCRINOLOGY | Facility: CLINIC | Age: 74
End: 2019-07-26

## 2019-07-29 ENCOUNTER — TELEPHONE (OUTPATIENT)
Dept: CARDIOTHORACIC SURGERY | Facility: CLINIC | Age: 74
End: 2019-07-29

## 2019-07-31 ENCOUNTER — PATIENT OUTREACH (OUTPATIENT)
Dept: OTHER | Facility: OTHER | Age: 74
End: 2019-07-31

## 2019-07-31 NOTE — PROGRESS NOTES
Last 5 Patient Entered Readings                                      Current 30 Day Average: 126/85     Recent Readings 7/29/2019 7/27/2019 7/26/2019 7/23/2019 7/23/2019    SBP (mmHg) 138 111 137 120 119    DBP (mmHg) 94 58 86 80 90    Pulse 84 99 89 80 97        Last 6 Patient Entered Readings                                          Most Recent A1c: 7.6% on 7/9/2019  (Goal: 8%)     Recent Readings 7/30/2019 7/29/2019 7/29/2019 7/26/2019 7/23/2019    Blood Glucose (mg/dL) 157 147 180 140 172        7/31/19: Called pt for f/u on HTN and DM. [email or web address]

## 2019-07-31 NOTE — PROGRESS NOTES
Last 5 Patient Entered Readings                                      Current 30 Day Average: 126/85     Recent Readings 7/29/2019 7/27/2019 7/26/2019 7/23/2019 7/23/2019    SBP (mmHg) 138 111 137 120 119    DBP (mmHg) 94 58 86 80 90    Pulse 84 99 89 80 97          Last 6 Patient Entered Readings                                          Most Recent A1c: 7.6% on 7/9/2019  (Goal: 8%)     Recent Readings 7/30/2019 7/29/2019 7/29/2019 7/26/2019 7/23/2019    Blood Glucose (mg/dL) 157 147 180 140 172          7/31: New pharmacist attempted to contact patient.  Pushing call back 2 weeks.

## 2019-08-01 ENCOUNTER — TELEPHONE (OUTPATIENT)
Dept: PREADMISSION TESTING | Facility: HOSPITAL | Age: 74
End: 2019-08-01

## 2019-08-01 ENCOUNTER — OFFICE VISIT (OUTPATIENT)
Dept: PRIMARY CARE CLINIC | Facility: CLINIC | Age: 74
End: 2019-08-01
Payer: MEDICARE

## 2019-08-01 ENCOUNTER — LAB VISIT (OUTPATIENT)
Dept: LAB | Facility: HOSPITAL | Age: 74
End: 2019-08-01
Attending: INTERNAL MEDICINE
Payer: MEDICARE

## 2019-08-01 VITALS
BODY MASS INDEX: 41.79 KG/M2 | WEIGHT: 227.13 LBS | HEIGHT: 62 IN | OXYGEN SATURATION: 96 % | SYSTOLIC BLOOD PRESSURE: 140 MMHG | DIASTOLIC BLOOD PRESSURE: 80 MMHG | HEART RATE: 97 BPM

## 2019-08-01 DIAGNOSIS — Z79.01 MONITORING FOR ANTICOAGULANT USE: ICD-10-CM

## 2019-08-01 DIAGNOSIS — S70.02XS: ICD-10-CM

## 2019-08-01 DIAGNOSIS — I25.10 CORONARY ARTERY DISEASE, ANGINA PRESENCE UNSPECIFIED, UNSPECIFIED VESSEL OR LESION TYPE, UNSPECIFIED WHETHER NATIVE OR TRANSPLANTED HEART: Primary | ICD-10-CM

## 2019-08-01 DIAGNOSIS — I21.4 NSTEMI (NON-ST ELEVATED MYOCARDIAL INFARCTION): Primary | ICD-10-CM

## 2019-08-01 DIAGNOSIS — I25.119 CORONARY ARTERY DISEASE INVOLVING NATIVE CORONARY ARTERY OF NATIVE HEART WITH ANGINA PECTORIS: ICD-10-CM

## 2019-08-01 DIAGNOSIS — N18.30 TYPE 2 DIABETES MELLITUS WITH STAGE 3 CHRONIC KIDNEY DISEASE, WITH LONG-TERM CURRENT USE OF INSULIN: ICD-10-CM

## 2019-08-01 DIAGNOSIS — S70.12XS: ICD-10-CM

## 2019-08-01 DIAGNOSIS — Z79.4 TYPE 2 DIABETES MELLITUS WITH STAGE 3 CHRONIC KIDNEY DISEASE, WITH LONG-TERM CURRENT USE OF INSULIN: ICD-10-CM

## 2019-08-01 DIAGNOSIS — I50.89 OTHER HEART FAILURE: ICD-10-CM

## 2019-08-01 DIAGNOSIS — I11.9 HYPERTENSIVE HEART DISEASE WITHOUT HEART FAILURE: ICD-10-CM

## 2019-08-01 DIAGNOSIS — E11.22 TYPE 2 DIABETES MELLITUS WITH STAGE 3 CHRONIC KIDNEY DISEASE, WITH LONG-TERM CURRENT USE OF INSULIN: ICD-10-CM

## 2019-08-01 DIAGNOSIS — Z51.81 MONITORING FOR ANTICOAGULANT USE: ICD-10-CM

## 2019-08-01 DIAGNOSIS — Z79.899 ENCOUNTER FOR LONG-TERM (CURRENT) USE OF MEDICATIONS: ICD-10-CM

## 2019-08-01 LAB
ALBUMIN SERPL BCP-MCNC: 3.3 G/DL (ref 3.5–5.2)
ANION GAP SERPL CALC-SCNC: 9 MMOL/L (ref 8–16)
BASOPHILS # BLD AUTO: 0.02 K/UL (ref 0–0.2)
BASOPHILS NFR BLD: 0.3 % (ref 0–1.9)
BUN SERPL-MCNC: 15 MG/DL (ref 8–23)
CALCIUM SERPL-MCNC: 10.2 MG/DL (ref 8.7–10.5)
CHLORIDE SERPL-SCNC: 105 MMOL/L (ref 95–110)
CO2 SERPL-SCNC: 26 MMOL/L (ref 23–29)
CREAT SERPL-MCNC: 1 MG/DL (ref 0.5–1.4)
DIFFERENTIAL METHOD: ABNORMAL
EOSINOPHIL # BLD AUTO: 0.2 K/UL (ref 0–0.5)
EOSINOPHIL NFR BLD: 2.3 % (ref 0–8)
ERYTHROCYTE [DISTWIDTH] IN BLOOD BY AUTOMATED COUNT: 14.3 % (ref 11.5–14.5)
EST. GFR  (AFRICAN AMERICAN): >60 ML/MIN/1.73 M^2
EST. GFR  (NON AFRICAN AMERICAN): 56.4 ML/MIN/1.73 M^2
GLUCOSE SERPL-MCNC: 77 MG/DL (ref 70–110)
HCT VFR BLD AUTO: 31.4 % (ref 37–48.5)
HGB BLD-MCNC: 9.9 G/DL (ref 12–16)
IMM GRANULOCYTES # BLD AUTO: 0.03 K/UL (ref 0–0.04)
IMM GRANULOCYTES NFR BLD AUTO: 0.4 % (ref 0–0.5)
LYMPHOCYTES # BLD AUTO: 1.4 K/UL (ref 1–4.8)
LYMPHOCYTES NFR BLD: 19.4 % (ref 18–48)
MCH RBC QN AUTO: 27.4 PG (ref 27–31)
MCHC RBC AUTO-ENTMCNC: 31.5 G/DL (ref 32–36)
MCV RBC AUTO: 87 FL (ref 82–98)
MONOCYTES # BLD AUTO: 0.5 K/UL (ref 0.3–1)
MONOCYTES NFR BLD: 6.9 % (ref 4–15)
NEUTROPHILS # BLD AUTO: 5.2 K/UL (ref 1.8–7.7)
NEUTROPHILS NFR BLD: 70.7 % (ref 38–73)
NRBC BLD-RTO: 0 /100 WBC
PHOSPHATE SERPL-MCNC: 2.5 MG/DL (ref 2.7–4.5)
PLATELET # BLD AUTO: 243 K/UL (ref 150–350)
PMV BLD AUTO: 10.6 FL (ref 9.2–12.9)
POTASSIUM SERPL-SCNC: 3.8 MMOL/L (ref 3.5–5.1)
RBC # BLD AUTO: 3.61 M/UL (ref 4–5.4)
SODIUM SERPL-SCNC: 140 MMOL/L (ref 136–145)
WBC # BLD AUTO: 7.41 K/UL (ref 3.9–12.7)

## 2019-08-01 PROCEDURE — 99999 PR PBB SHADOW E&M-EST. PATIENT-LVL III: CPT | Mod: PBBFAC,,, | Performed by: INTERNAL MEDICINE

## 2019-08-01 PROCEDURE — 1101F PR PT FALLS ASSESS DOC 0-1 FALLS W/OUT INJ PAST YR: ICD-10-PCS | Mod: CPTII,S$GLB,, | Performed by: INTERNAL MEDICINE

## 2019-08-01 PROCEDURE — 85025 COMPLETE CBC W/AUTO DIFF WBC: CPT

## 2019-08-01 PROCEDURE — 99214 OFFICE O/P EST MOD 30 MIN: CPT | Mod: S$GLB,,, | Performed by: INTERNAL MEDICINE

## 2019-08-01 PROCEDURE — 3079F PR MOST RECENT DIASTOLIC BLOOD PRESSURE 80-89 MM HG: ICD-10-PCS | Mod: CPTII,S$GLB,, | Performed by: INTERNAL MEDICINE

## 2019-08-01 PROCEDURE — 3045F PR MOST RECENT HEMOGLOBIN A1C LEVEL 7.0-9.0%: CPT | Mod: CPTII,S$GLB,, | Performed by: INTERNAL MEDICINE

## 2019-08-01 PROCEDURE — 3077F PR MOST RECENT SYSTOLIC BLOOD PRESSURE >= 140 MM HG: ICD-10-PCS | Mod: CPTII,S$GLB,, | Performed by: INTERNAL MEDICINE

## 2019-08-01 PROCEDURE — 3079F DIAST BP 80-89 MM HG: CPT | Mod: CPTII,S$GLB,, | Performed by: INTERNAL MEDICINE

## 2019-08-01 PROCEDURE — 1101F PT FALLS ASSESS-DOCD LE1/YR: CPT | Mod: CPTII,S$GLB,, | Performed by: INTERNAL MEDICINE

## 2019-08-01 PROCEDURE — 99499 UNLISTED E&M SERVICE: CPT | Mod: S$GLB,,, | Performed by: INTERNAL MEDICINE

## 2019-08-01 PROCEDURE — 99499 RISK ADDL DX/OHS AUDIT: ICD-10-PCS | Mod: S$GLB,,, | Performed by: INTERNAL MEDICINE

## 2019-08-01 PROCEDURE — 99214 PR OFFICE/OUTPT VISIT, EST, LEVL IV, 30-39 MIN: ICD-10-PCS | Mod: S$GLB,,, | Performed by: INTERNAL MEDICINE

## 2019-08-01 PROCEDURE — 99999 PR PBB SHADOW E&M-EST. PATIENT-LVL III: ICD-10-PCS | Mod: PBBFAC,,, | Performed by: INTERNAL MEDICINE

## 2019-08-01 PROCEDURE — 80069 RENAL FUNCTION PANEL: CPT

## 2019-08-01 PROCEDURE — 3045F PR MOST RECENT HEMOGLOBIN A1C LEVEL 7.0-9.0%: ICD-10-PCS | Mod: CPTII,S$GLB,, | Performed by: INTERNAL MEDICINE

## 2019-08-01 PROCEDURE — 36415 COLL VENOUS BLD VENIPUNCTURE: CPT | Mod: PN

## 2019-08-01 PROCEDURE — 3077F SYST BP >= 140 MM HG: CPT | Mod: CPTII,S$GLB,, | Performed by: INTERNAL MEDICINE

## 2019-08-01 NOTE — TELEPHONE ENCOUNTER
----- Message from Veda Tubbs RN sent at 8/1/2019 12:24 PM CDT -----  Hi,  Would you schedule this pt an Anest visit. Her pre op visit is 8/7 and surgery is 8/12 with Dr. Ventura. Thanks.  -Vdea

## 2019-08-04 PROBLEM — I25.119 CORONARY ARTERY DISEASE INVOLVING NATIVE CORONARY ARTERY OF NATIVE HEART WITH ANGINA PECTORIS: Status: ACTIVE | Noted: 2019-08-04
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
## 2019-08-05 ENCOUNTER — OFFICE VISIT (OUTPATIENT)
Dept: CARDIOLOGY | Facility: CLINIC | Age: 74
End: 2019-08-05
Payer: MEDICARE

## 2019-08-05 VITALS
DIASTOLIC BLOOD PRESSURE: 68 MMHG | HEART RATE: 85 BPM | BODY MASS INDEX: 42.15 KG/M2 | SYSTOLIC BLOOD PRESSURE: 124 MMHG | WEIGHT: 229.06 LBS | HEIGHT: 62 IN

## 2019-08-05 DIAGNOSIS — Z86.73 HISTORY OF TIA (TRANSIENT ISCHEMIC ATTACK): ICD-10-CM

## 2019-08-05 DIAGNOSIS — E78.2 MIXED HYPERLIPIDEMIA: ICD-10-CM

## 2019-08-05 DIAGNOSIS — E66.01 MORBID OBESITY WITH BODY MASS INDEX (BMI) OF 40.0 OR HIGHER: ICD-10-CM

## 2019-08-05 DIAGNOSIS — I50.30 HYPERTENSIVE HEART DISEASE WITH DIASTOLIC HEART FAILURE: ICD-10-CM

## 2019-08-05 DIAGNOSIS — I25.119 CORONARY ARTERY DISEASE INVOLVING NATIVE CORONARY ARTERY OF NATIVE HEART WITH ANGINA PECTORIS: Primary | ICD-10-CM

## 2019-08-05 DIAGNOSIS — N18.30 CKD (CHRONIC KIDNEY DISEASE) STAGE 3, GFR 30-59 ML/MIN: ICD-10-CM

## 2019-08-05 DIAGNOSIS — I21.4 NSTEMI (NON-ST ELEVATED MYOCARDIAL INFARCTION): ICD-10-CM

## 2019-08-05 DIAGNOSIS — G47.33 OSA ON CPAP: ICD-10-CM

## 2019-08-05 DIAGNOSIS — I11.0 HYPERTENSIVE HEART DISEASE WITH DIASTOLIC HEART FAILURE: ICD-10-CM

## 2019-08-05 DIAGNOSIS — R53.81 PHYSICAL DECONDITIONING: ICD-10-CM

## 2019-08-05 PROCEDURE — 1101F PT FALLS ASSESS-DOCD LE1/YR: CPT | Mod: CPTII,S$GLB,, | Performed by: INTERNAL MEDICINE

## 2019-08-05 PROCEDURE — 99499 RISK ADDL DX/OHS AUDIT: ICD-10-PCS | Mod: S$GLB,,, | Performed by: INTERNAL MEDICINE

## 2019-08-05 PROCEDURE — 3074F SYST BP LT 130 MM HG: CPT | Mod: CPTII,S$GLB,, | Performed by: INTERNAL MEDICINE

## 2019-08-05 PROCEDURE — 3078F DIAST BP <80 MM HG: CPT | Mod: CPTII,S$GLB,, | Performed by: INTERNAL MEDICINE

## 2019-08-05 PROCEDURE — 99999 PR PBB SHADOW E&M-EST. PATIENT-LVL IV: CPT | Mod: PBBFAC,,, | Performed by: INTERNAL MEDICINE

## 2019-08-05 PROCEDURE — 99999 PR PBB SHADOW E&M-EST. PATIENT-LVL IV: ICD-10-PCS | Mod: PBBFAC,,, | Performed by: INTERNAL MEDICINE

## 2019-08-05 PROCEDURE — 99214 OFFICE O/P EST MOD 30 MIN: CPT | Mod: S$GLB,,, | Performed by: INTERNAL MEDICINE

## 2019-08-05 PROCEDURE — 99499 UNLISTED E&M SERVICE: CPT | Mod: S$GLB,,, | Performed by: INTERNAL MEDICINE

## 2019-08-05 PROCEDURE — 99214 PR OFFICE/OUTPT VISIT, EST, LEVL IV, 30-39 MIN: ICD-10-PCS | Mod: S$GLB,,, | Performed by: INTERNAL MEDICINE

## 2019-08-05 PROCEDURE — 3074F PR MOST RECENT SYSTOLIC BLOOD PRESSURE < 130 MM HG: ICD-10-PCS | Mod: CPTII,S$GLB,, | Performed by: INTERNAL MEDICINE

## 2019-08-05 PROCEDURE — 1101F PR PT FALLS ASSESS DOC 0-1 FALLS W/OUT INJ PAST YR: ICD-10-PCS | Mod: CPTII,S$GLB,, | Performed by: INTERNAL MEDICINE

## 2019-08-05 PROCEDURE — 3078F PR MOST RECENT DIASTOLIC BLOOD PRESSURE < 80 MM HG: ICD-10-PCS | Mod: CPTII,S$GLB,, | Performed by: INTERNAL MEDICINE

## 2019-08-05 NOTE — LETTER
August 5, 2019      Nara Asif, NP  [address]  [place] [address]           [facility] - Cardiology  [address]  [place] [address]  Phone: [phone]          Patient: Kaylee Romero   MR Number: [id number]   YOB: 1945   Date of Visit: 8/5/2019       Dear Nara Asif:    Thank you for referring Kaylee Romero to me for evaluation. Attached you will find relevant portions of my assessment and plan of care.    If you have questions, please do not hesitate to call me. I look forward to following Kaylee Romero along with you.    Sincerely,    Pop Henderson MD    Enclosure  CC:  No Recipients    If you would like to receive this communication electronically, please contact [email or web address] or [phone] to request more information on [facility] access.    For providers and/or their staff who would like to refer a patient to [facility], please contact us through our one-stop-shop provider referral line, [facility], at [phone].    If you feel you have received this communication in error or would no longer like to receive these types of communications, please e-mail [email or web address]

## 2019-08-05 NOTE — PROGRESS NOTES
Subjective:    Patient ID:  Kaylee Romero is a 74 y.o. female who presents for follow-up of Other heart failure and Coronary artery disease, angina presence unspecified, unspec      HPI     The patient is a 74 year old female presented 11/5/18 for evaluation of chest heaviness. She also reported a change in her hand dexterity.She presented to [facility] 7/9/19 with chest pain and a Tn 0.3 and was transferred to [facility]. See [facility] below. She was discharged  and was seen by Dr Ventura in hi clinic and is scheduled for a CABG 8/12/19. She is continues to have precordial pressure with associated with numbness in her left arm. This occurs at rest and has used TNG 5 times since discharge. She stopped brilinta yesterday.      Conclusion 12/13/18    · The patient reported no symptoms during the stress test.  · Arrhythmias during stress: rare PACs.  · Overall, the patient's exercise capacity was severely impaired.  · The EKG portion of this study is negative for myocardial ischemia.  · Normal left ventricular systolic function. The estimated ejection fraction is 65%  · Concentric left ventricular remodeling.  · No wall motion abnormalities.  · Indeterminate left ventricular diastolic function.  · Normal right ventricular systolic function.  · Normal atrial size.  · The stress echo portion of this study is negative for myocardial ischemia.        Coronary Findings 7/9/19    Diagnostic   Dominance: Right   Left Main   The vessel exhibits minimal luminal irregularities.   Left Anterior Descending   Mid LAD lesion is 80% stenosed.   Mid LAD to Dist LAD lesion is 80% stenosed.   First Diagonal Branch   There is mild diffuse disease throughout the vessel.   Second Diagonal Branch   The vessel exhibits minimal luminal irregularities.   Left Circumflex   There is moderate diffuse disease throughout the vessel.   First Obtuse Marginal Branch   1st Mrg lesion is 75% stenosed.   Right Coronary Artery   Prox RCA to Mid RCA lesion is 95%  stenosed.   Dist RCA lesion is 60% stenosed.   Right Posterior Descending Artery   There is mild diffuse disease throughout the vessel.   First Right Posterolateral   The vessel exhibits minimal luminal irregularities.   Intervention     No interventions     Lab Results   Component Value Date     08/01/2019    K 3.8 08/01/2019     08/01/2019    CO2 26 08/01/2019    BUN 15 08/01/2019    CREATININE 1.0 08/01/2019    GLU 77 08/01/2019    HGBA1C 7.6 (H) 07/09/2019    MG 2.4 07/11/2019    AST 23 07/09/2019    ALT 24 07/09/2019    ALBUMIN 3.3 (L) 08/01/2019    PROT 7.8 07/09/2019    BILITOT 0.3 07/09/2019    WBC 7.41 08/01/2019    HGB 9.9 (L) 08/01/2019    HCT 31.4 (L) 08/01/2019    MCV 87 08/01/2019     08/01/2019    INR 1.0 07/09/2019    TSH 2.307 05/23/2019         Lab Results   Component Value Date    CHOL 134 07/09/2019    HDL 43 07/09/2019    TRIG 101 07/09/2019       Lab Results   Component Value Date    LDLCALC 70.8 07/09/2019       Past Medical History:   Diagnosis Date    Acid reflux     Age-related osteoporosis without current pathological fracture 1/11/2019    Cataract     CKD (chronic kidney disease) stage 3, GFR 30-59 ml/min     Dry mouth     History of TIA (transient ischemic attack) 12/11/2018    Hyperlipidemia 12/2/2014    Hypertensive heart disease with diastolic heart failure 11/28/2012    Morbid obesity with body mass index (BMI) of 40.0 or higher 5/9/2016    KAMRAN (obstructive sleep apnea)     KAMRAN on CPAP 6/28/2016    Osteoporosis without current pathological fracture 11/11/2018    Physical deconditioning 11/5/2018    Status post total left knee replacement 5/1/2017 5/16/2017    Type 2 diabetes mellitus with stage 3 chronic kidney disease, without long-term current use of insulin 5/16/2019       Current Outpatient Medications:     alcohol swabs (ALCOHOL PADS) PadM, Apply 1 each topically as needed., Disp: 11 each, Rfl: 11    alendronate (FOSAMAX) 70 MG tablet, Take 1  "tablet (70 mg total) by mouth every 7 days. Take with a full glass of water & remain upright for at least 30 minutes, Disp: 12 tablet, Rfl: 3    aspirin (ECOTRIN) 81 MG EC tablet, Take 1 tablet (81 mg total) by mouth once daily., Disp: 1 tablet, Rfl: 0    atorvastatin (LIPITOR) 80 MG tablet, Take 1 tablet (80 mg total) by mouth once daily., Disp: 90 tablet, Rfl: 3    blood sugar diagnostic Strp, 1 strip by Misc.(Non-Drug; Combo Route) route once daily., Disp: 100 strip, Rfl: 3    blood-glucose meter kit, Use as instructed, Disp: 1 each, Rfl: 0    carvedilol (COREG) 6.25 MG tablet, Take 1 tablet (6.25 mg total) by mouth 2 (two) times daily., Disp: 180 tablet, Rfl: 0    chlorthalidone (HYGROTEN) 25 MG Tab, Take 1 tablet (25 mg total) by mouth once daily., Disp: 30 tablet, Rfl: 11    glipiZIDE (GLUCOTROL) 10 MG TR24, Take 2 tablets (20 mg total) by mouth daily with breakfast., Disp: 60 tablet, Rfl: 2    insulin detemir U-100 (LEVEMIR FLEXTOUCH) 100 unit/mL (3 mL) SubQ InPn pen, Inject 8 Units into the skin every evening., Disp: 3 mL, Rfl: 2    lancets Misc, 1 lancet by Misc.(Non-Drug; Combo Route) route once daily., Disp: 100 each, Rfl: 3    multivitamin (ONE DAILY MULTIVITAMIN) per tablet, Take 1 tablet by mouth once daily., Disp: , Rfl:     NIFEdipine (PROCARDIA-XL) 30 MG (OSM) 24 hr tablet, Take 1 tablet (30 mg total) by mouth once daily., Disp: 90 tablet, Rfl: 0    nitroGLYCERIN (NITROSTAT) 0.4 MG SL tablet, Place 1 tablet (0.4 mg total) under the tongue every 5 (five) minutes as needed for Chest pain (angina)., Disp: 25 tablet, Rfl: 3    pen needle, diabetic 31 gauge x 5/16" Ndle, Use every evening., Disp: 100 each, Rfl: 11    ticagrelor (BRILINTA) 90 mg tablet, Take 1 tablet (90 mg total) by mouth 2 (two) times daily., Disp: 180 tablet, Rfl: 3    TRULICITY 1.5 mg/0.5 mL PnIj, INJECT 1.5 MG UNDER THE SKIN ONCE A WEEK, Disp: 2 mL, Rfl: 5    valsartan (DIOVAN) 320 MG tablet, Take 1 tablet (320 mg " "total) by mouth once daily., Disp: 90 tablet, Rfl: 1          ROS     Objective:/68 (BP Location: Left arm, Patient Position: Sitting, BP Method: X-Large (Automatic))   Pulse 85   Ht 5' 2" (1.575 m)   Wt 103.9 kg (229 lb 0.9 oz)   LMP 01/09/2001 (Approximate)   BMI 41.90 kg/m²             Physical Exam   Constitutional: She is oriented to person, place, and time. She appears well-developed and well-nourished.   morbid   HENT:   Head: Normocephalic.   Right Ear: External ear normal.   Left Ear: External ear normal.   Nose: Nose normal.   Inspection of lips, teeth and gums normal   Eyes: Pupils are equal, round, and reactive to light. Conjunctivae and EOM are normal. No scleral icterus.   Neck: Normal range of motion. No JVD present. No tracheal deviation present. No thyromegaly present.   Cardiovascular: Normal rate, regular rhythm, normal heart sounds and intact distal pulses. Exam reveals no gallop and no friction rub.   No murmur heard.  Pulses:       Dorsalis pedis pulses are 2+ on the right side, and 2+ on the left side.        Posterior tibial pulses are 2+ on the right side, and 2+ on the left side.   Pulmonary/Chest: Effort normal and breath sounds normal. No respiratory distress. She has no wheezes. She has no rales. She exhibits no tenderness.   Abdominal: Soft. Bowel sounds are normal. She exhibits no distension. There is no hepatosplenomegaly. There is no tenderness. There is no guarding.   Musculoskeletal: Normal range of motion. She exhibits edema (trace). She exhibits no tenderness.   Lymphadenopathy:   Palpation of lymph nodes of neck and groin normal   Neurological: She is oriented to person, place, and time. No cranial nerve deficit. She exhibits normal muscle tone. Coordination normal.   Skin: Skin is warm and dry. No rash noted. No erythema. No pallor.   Palpation of skin normal   Psychiatric: She has a normal mood and affect. Her behavior is normal. Judgment and thought content normal. "         Assessment:       1. Coronary artery disease involving native coronary artery of native heart with angina pectoris    2. NSTEMI (non-ST elevated myocardial infarction)    3. History of TIA (transient ischemic attack)    4. Mixed hyperlipidemia    5. Hypertensive heart disease with diastolic heart failure    6. CKD (chronic kidney disease) stage 3, GFR 30-59 ml/min    7. Morbid obesity with body mass index (BMI) of 40.0 or higher    8. KAMRAN on CPAP    9. Physical deconditioning         Plan:       Kaylee was seen today for other heart failure and coronary artery disease, angina presence unspecified, unspec.    Diagnoses and all orders for this visit:    Coronary artery disease involving native coronary artery of native heart with angina pectoris    NSTEMI (non-ST elevated myocardial infarction)    History of TIA (transient ischemic attack)    Mixed hyperlipidemia    Hypertensive heart disease with diastolic heart failure    CKD (chronic kidney disease) stage 3, GFR 30-59 ml/min    Morbid obesity with body mass index (BMI) of 40.0 or higher    KAMRAN on CPAP    Physical deconditioning

## 2019-08-07 ENCOUNTER — HOSPITAL ENCOUNTER (OUTPATIENT)
Dept: PREADMISSION TESTING | Facility: HOSPITAL | Age: 74
Discharge: HOME | End: 2019-08-07
Attending: THORACIC SURGERY (CARDIOTHORACIC VASCULAR SURGERY)
Payer: MEDICARE

## 2019-08-07 ENCOUNTER — CLINICAL SUPPORT (OUTPATIENT)
Dept: CARDIOLOGY | Facility: CLINIC | Age: 74
End: 2019-08-07
Attending: THORACIC SURGERY (CARDIOTHORACIC VASCULAR SURGERY)
Payer: MEDICARE

## 2019-08-07 ENCOUNTER — HOSPITAL ENCOUNTER (OUTPATIENT)
Dept: PULMONOLOGY | Facility: CLINIC | Age: 74
Discharge: HOME | End: 2019-08-07
Payer: MEDICARE

## 2019-08-07 ENCOUNTER — ANESTHESIA EVENT (OUTPATIENT)
Dept: SURGERY | Facility: HOSPITAL | Age: 74
DRG: 235 | End: 2019-08-07
Payer: MEDICARE

## 2019-08-07 ENCOUNTER — OFFICE VISIT (OUTPATIENT)
Dept: CARDIOTHORACIC SURGERY | Facility: CLINIC | Age: 74
End: 2019-08-07
Payer: MEDICARE

## 2019-08-07 ENCOUNTER — DOCUMENTATION ONLY (OUTPATIENT)
Dept: CARDIOTHORACIC SURGERY | Facility: CLINIC | Age: 74
End: 2019-08-07

## 2019-08-07 VITALS
HEIGHT: 62 IN | RESPIRATION RATE: 16 BRPM | SYSTOLIC BLOOD PRESSURE: 140 MMHG | BODY MASS INDEX: 41.77 KG/M2 | OXYGEN SATURATION: 98 % | DIASTOLIC BLOOD PRESSURE: 83 MMHG | HEART RATE: 85 BPM | TEMPERATURE: 98 F | WEIGHT: 227 LBS

## 2019-08-07 VITALS
HEIGHT: 62 IN | BODY MASS INDEX: 42.05 KG/M2 | WEIGHT: 228.5 LBS | DIASTOLIC BLOOD PRESSURE: 91 MMHG | SYSTOLIC BLOOD PRESSURE: 140 MMHG | HEART RATE: 88 BPM | OXYGEN SATURATION: 100 % | TEMPERATURE: 98 F

## 2019-08-07 DIAGNOSIS — I25.10 CORONARY ARTERY DISEASE, ANGINA PRESENCE UNSPECIFIED, UNSPECIFIED VESSEL OR LESION TYPE, UNSPECIFIED WHETHER NATIVE OR TRANSPLANTED HEART: ICD-10-CM

## 2019-08-07 DIAGNOSIS — I25.10 CAD (CORONARY ARTERY DISEASE): ICD-10-CM

## 2019-08-07 DIAGNOSIS — I25.119 CORONARY ARTERY DISEASE INVOLVING NATIVE CORONARY ARTERY OF NATIVE HEART WITH ANGINA PECTORIS: Primary | ICD-10-CM

## 2019-08-07 DIAGNOSIS — I50.89 OTHER HEART FAILURE: ICD-10-CM

## 2019-08-07 LAB
LEFT CBA DIAS: 17 CM/S
LEFT CBA SYS: 76 CM/S
LEFT CCA DIST DIAS: 18 CM/S
LEFT CCA DIST SYS: 66 CM/S
LEFT CCA MID DIAS: 22 CM/S
LEFT CCA MID SYS: 94 CM/S
LEFT CCA PROX DIAS: 26 CM/S
LEFT CCA PROX SYS: 130 CM/S
LEFT ECA DIAS: 10 CM/S
LEFT ECA SYS: 57 CM/S
LEFT ICA DIST DIAS: 24 CM/S
LEFT ICA DIST SYS: 60 CM/S
LEFT ICA MID DIAS: 21 CM/S
LEFT ICA MID SYS: 57 CM/S
LEFT ICA PROX DIAS: 25 CM/S
LEFT ICA PROX SYS: 71 CM/S
LEFT VERTEBRAL DIAS: 14 CM/S
LEFT VERTEBRAL SYS: 41 CM/S
OHS CV CAROTID RIGHT ICA EDV HIGHEST: 23
OHS CV CAROTID ULTRASOUND LEFT ICA/CCA RATIO: 0.55
OHS CV CAROTID ULTRASOUND RIGHT ICA/CCA RATIO: 0.8
OHS CV PV CAROTID LEFT HIGHEST CCA: 130
OHS CV PV CAROTID LEFT HIGHEST ICA: 71
OHS CV PV CAROTID RIGHT HIGHEST CCA: 80
OHS CV PV CAROTID RIGHT HIGHEST ICA: 64
OHS CV US CAROTID LEFT HIGHEST EDV: 25
PRE FEV1 FVC: 81
PRE FEV1: 1.96
PRE FVC: 2.41
PREDICTED FEV1 FVC: 80
PREDICTED FEV1: 1.86
PREDICTED FVC: 2.38
RIGHT ARM DIASTOLIC BLOOD PRESSURE: 68 MMHG
RIGHT ARM SYSTOLIC BLOOD PRESSURE: 124 MMHG
RIGHT CBA DIAS: 25 CM/S
RIGHT CBA SYS: 89 CM/S
RIGHT CCA DIST DIAS: 22 CM/S
RIGHT CCA DIST SYS: 77 CM/S
RIGHT CCA MID DIAS: 19 CM/S
RIGHT CCA MID SYS: 80 CM/S
RIGHT CCA PROX DIAS: 20 CM/S
RIGHT CCA PROX SYS: 80 CM/S
RIGHT ECA DIAS: 11 CM/S
RIGHT ECA SYS: 69 CM/S
RIGHT ICA DIST DIAS: 23 CM/S
RIGHT ICA DIST SYS: 64 CM/S
RIGHT ICA MID DIAS: 16 CM/S
RIGHT ICA MID SYS: 60 CM/S
RIGHT ICA PROX DIAS: 17 CM/S
RIGHT ICA PROX SYS: 64 CM/S
RIGHT VERTEBRAL DIAS: 12 CM/S
RIGHT VERTEBRAL SYS: 43 CM/S

## 2019-08-07 PROCEDURE — 99999 PR PBB SHADOW E&M-EST. PATIENT-LVL V: CPT | Mod: PBBFAC,,, | Performed by: THORACIC SURGERY (CARDIOTHORACIC VASCULAR SURGERY)

## 2019-08-07 PROCEDURE — 99499 UNLISTED E&M SERVICE: CPT | Mod: S$GLB,,, | Performed by: THORACIC SURGERY (CARDIOTHORACIC VASCULAR SURGERY)

## 2019-08-07 PROCEDURE — 94729 PR C02/MEMBANE DIFFUSE CAPACITY: ICD-10-PCS | Mod: S$GLB,,, | Performed by: INTERNAL MEDICINE

## 2019-08-07 PROCEDURE — 99499 NO LOS: ICD-10-PCS | Mod: S$GLB,,, | Performed by: THORACIC SURGERY (CARDIOTHORACIC VASCULAR SURGERY)

## 2019-08-07 PROCEDURE — 94010 BREATHING CAPACITY TEST: CPT | Mod: S$GLB,,, | Performed by: INTERNAL MEDICINE

## 2019-08-07 PROCEDURE — 94729 DIFFUSING CAPACITY: CPT | Mod: S$GLB,,, | Performed by: INTERNAL MEDICINE

## 2019-08-07 PROCEDURE — 93880 CV US DOPPLER CAROTID (CUPID ONLY): ICD-10-PCS | Mod: S$GLB,,, | Performed by: INTERNAL MEDICINE

## 2019-08-07 PROCEDURE — 94010 BREATHING CAPACITY TEST: ICD-10-PCS | Mod: S$GLB,,, | Performed by: INTERNAL MEDICINE

## 2019-08-07 PROCEDURE — 99999 PR PBB SHADOW E&M-EST. PATIENT-LVL V: ICD-10-PCS | Mod: PBBFAC,,, | Performed by: THORACIC SURGERY (CARDIOTHORACIC VASCULAR SURGERY)

## 2019-08-07 PROCEDURE — 93880 EXTRACRANIAL BILAT STUDY: CPT | Mod: S$GLB,,, | Performed by: INTERNAL MEDICINE

## 2019-08-07 RX ORDER — LIDOCAINE HYDROCHLORIDE 10 MG/ML
1 INJECTION, SOLUTION EPIDURAL; INFILTRATION; INTRACAUDAL; PERINEURAL
Status: CANCELLED | OUTPATIENT
Start: 2019-08-07

## 2019-08-07 RX ORDER — POTASSIUM CHLORIDE 750 MG/1
20 TABLET, EXTENDED RELEASE ORAL EVERY 12 HOURS
Status: CANCELLED | OUTPATIENT
Start: 2019-08-07

## 2019-08-07 RX ORDER — MUPIROCIN 20 MG/G
1 OINTMENT TOPICAL
Status: CANCELLED | OUTPATIENT
Start: 2019-08-07

## 2019-08-07 RX ORDER — ONDANSETRON 2 MG/ML
4 INJECTION INTRAMUSCULAR; INTRAVENOUS EVERY 12 HOURS PRN
Status: CANCELLED | OUTPATIENT
Start: 2019-08-07

## 2019-08-07 RX ORDER — POTASSIUM CHLORIDE 14.9 MG/ML
20 INJECTION INTRAVENOUS
Status: CANCELLED | OUTPATIENT
Start: 2019-08-07

## 2019-08-07 RX ORDER — DOCUSATE SODIUM 100 MG/1
100 CAPSULE, LIQUID FILLED ORAL 2 TIMES DAILY
Status: CANCELLED | OUTPATIENT
Start: 2019-08-07

## 2019-08-07 RX ORDER — ASPIRIN 325 MG
325 TABLET, DELAYED RELEASE (ENTERIC COATED) ORAL DAILY
Status: CANCELLED | OUTPATIENT
Start: 2019-08-07

## 2019-08-07 RX ORDER — FENTANYL CITRATE 50 UG/ML
50 INJECTION, SOLUTION INTRAMUSCULAR; INTRAVENOUS
Status: CANCELLED | OUTPATIENT
Start: 2019-08-14

## 2019-08-07 RX ORDER — FENTANYL CITRATE 50 UG/ML
25 INJECTION, SOLUTION INTRAMUSCULAR; INTRAVENOUS
Status: CANCELLED | OUTPATIENT
Start: 2019-08-07 | End: 2019-08-13

## 2019-08-07 RX ORDER — POLYETHYLENE GLYCOL 3350 17 G/17G
17 POWDER, FOR SOLUTION ORAL DAILY
Status: CANCELLED | OUTPATIENT
Start: 2019-08-07

## 2019-08-07 RX ORDER — IPRATROPIUM BROMIDE AND ALBUTEROL SULFATE 2.5; .5 MG/3ML; MG/3ML
3 SOLUTION RESPIRATORY (INHALATION) EVERY 4 HOURS
Status: CANCELLED | OUTPATIENT
Start: 2019-08-07 | End: 2019-08-08

## 2019-08-07 RX ORDER — FENTANYL CITRATE 50 UG/ML
25 INJECTION, SOLUTION INTRAMUSCULAR; INTRAVENOUS
Status: CANCELLED | OUTPATIENT
Start: 2019-08-07

## 2019-08-07 RX ORDER — OXYCODONE HYDROCHLORIDE 5 MG/1
10 TABLET ORAL EVERY 4 HOURS PRN
Status: CANCELLED | OUTPATIENT
Start: 2019-08-07

## 2019-08-07 RX ORDER — SODIUM CHLORIDE 9 MG/ML
INJECTION, SOLUTION INTRAVENOUS CONTINUOUS
Status: CANCELLED | OUTPATIENT
Start: 2019-08-07

## 2019-08-07 RX ORDER — IPRATROPIUM BROMIDE AND ALBUTEROL SULFATE 2.5; .5 MG/3ML; MG/3ML
3 SOLUTION RESPIRATORY (INHALATION) EVERY 4 HOURS PRN
Status: CANCELLED | OUTPATIENT
Start: 2019-08-07 | End: 2019-08-08

## 2019-08-07 RX ORDER — ATORVASTATIN CALCIUM 10 MG/1
40 TABLET, FILM COATED ORAL NIGHTLY
Status: CANCELLED | OUTPATIENT
Start: 2019-08-07

## 2019-08-07 RX ORDER — METOCLOPRAMIDE HYDROCHLORIDE 5 MG/ML
5 INJECTION INTRAMUSCULAR; INTRAVENOUS EVERY 6 HOURS PRN
Status: CANCELLED | OUTPATIENT
Start: 2019-08-07

## 2019-08-07 RX ORDER — POTASSIUM CHLORIDE 14.9 MG/ML
60 INJECTION INTRAVENOUS
Status: CANCELLED | OUTPATIENT
Start: 2019-08-07

## 2019-08-07 RX ORDER — POTASSIUM CHLORIDE 29.8 MG/ML
40 INJECTION INTRAVENOUS
Status: CANCELLED | OUTPATIENT
Start: 2019-08-07

## 2019-08-07 RX ORDER — ALBUMIN HUMAN 50 G/1000ML
500 SOLUTION INTRAVENOUS ONCE AS NEEDED
Status: CANCELLED | OUTPATIENT
Start: 2019-08-07 | End: 2031-01-03

## 2019-08-07 RX ORDER — ACETAMINOPHEN 325 MG/1
650 TABLET ORAL EVERY 4 HOURS PRN
Status: CANCELLED | OUTPATIENT
Start: 2019-08-07

## 2019-08-07 RX ORDER — ASPIRIN 325 MG
325 TABLET ORAL ONCE
Status: CANCELLED | OUTPATIENT
Start: 2019-08-07 | End: 2019-08-07

## 2019-08-07 RX ORDER — MUPIROCIN 20 MG/G
1 OINTMENT TOPICAL 2 TIMES DAILY
Status: CANCELLED | OUTPATIENT
Start: 2019-08-07 | End: 2019-08-12

## 2019-08-07 RX ORDER — OXYCODONE HYDROCHLORIDE 5 MG/1
5 TABLET ORAL EVERY 4 HOURS PRN
Status: CANCELLED | OUTPATIENT
Start: 2019-08-07

## 2019-08-07 RX ORDER — PANTOPRAZOLE SODIUM 40 MG/1
40 TABLET, DELAYED RELEASE ORAL
Status: CANCELLED | OUTPATIENT
Start: 2019-08-08

## 2019-08-07 RX ORDER — DEXTROSE MONOHYDRATE, SODIUM CHLORIDE, AND POTASSIUM CHLORIDE 50; 1.49; 4.5 G/1000ML; G/1000ML; G/1000ML
INJECTION, SOLUTION INTRAVENOUS CONTINUOUS
Status: CANCELLED | OUTPATIENT
Start: 2019-08-07

## 2019-08-07 RX ORDER — BISACODYL 10 MG
10 SUPPOSITORY, RECTAL RECTAL EVERY 12 HOURS PRN
Status: CANCELLED | OUTPATIENT
Start: 2019-08-07

## 2019-08-07 RX ORDER — METOPROLOL TARTRATE 25 MG/1
25 TABLET ORAL
Status: CANCELLED | OUTPATIENT
Start: 2019-08-07

## 2019-08-07 RX ORDER — PANTOPRAZOLE SODIUM 40 MG/10ML
40 INJECTION, POWDER, LYOPHILIZED, FOR SOLUTION INTRAVENOUS DAILY
Status: CANCELLED | OUTPATIENT
Start: 2019-08-07

## 2019-08-07 RX ORDER — ASPIRIN 300 MG/1
300 SUPPOSITORY RECTAL ONCE AS NEEDED
Status: CANCELLED | OUTPATIENT
Start: 2019-08-07 | End: 2031-01-03

## 2019-08-07 RX ORDER — ASPIRIN 325 MG
325 TABLET ORAL DAILY
Status: CANCELLED | OUTPATIENT
Start: 2019-08-07

## 2019-08-07 RX ORDER — SODIUM CHLORIDE 0.9 % (FLUSH) 0.9 %
10 SYRINGE (ML) INJECTION
Status: CANCELLED | OUTPATIENT
Start: 2019-08-07
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
## 2019-08-07 NOTE — PRE-PROCEDURE INSTRUCTIONS
PreOp Instructions given:  - Instructed patient to follow Cardiology directions for meds to take AM of surgery; Diabetic medication instructions given  - NPO guidelines  - Arrival place directions given; time to be given the day before procedure by the Surgeon's Office  - Bathing with antibacterial soap   - Don't wear any jewelry or bring any valuables AM of surgery  - No makeup or moisturizer to face  - No perfume/cologne, powder, lotions or aftershave    Pt. verbalized understanding.

## 2019-08-07 NOTE — PROGRESS NOTES
"PREPARING FOR SURGERY  Your surgery has been scheduled for:   Day:  Monday___            Date:  ___8/12/19__  Arrival Time: 5:00 am  Start Time: 7:00 am  You should report to the second-floor surgery center, located on the [facility] side of the second floor of the [facility]. The phone number is [phone].     PLEASE NOTE  · If you are allergic to any medications, please inform your doctor or the nurse responsible for your care.  · Tell the doctor if you take aspirin, products containing aspirin, herbal medications or blood thinners, such as Coumadin, Pradaxa, or Plavix.  · Notify your doctor if you are diabetic and provide information about the medications you take.  · Arrange for someone to drive you home following surgery. You will not be allowed to leave the surgical facility alone or drive yourself home following sedation and anesthesia.  · If you have not already done so, please bring a list of your medications with you the day of your surgery.     BEFORE SURGERY  Stop taking all herbal medications 14 days prior to surgery  Stop taking aspirin, products containing aspirin 0 days before surgery  Stop taking blood thinners 5 days before surgery  Refrain from drinking alcohol beverages for 24 hours before and after surgery  Stop or limit smoking 0 days before surgery  Other:          ------  THE NIGHT BEFORE SURGERY  Eat a light supper on the night before your surgery, no greasy or fatty foods.  DO NOT EAT OR DRINK ANYTHING AFTER MIDNIGHT, INCLUDING GUM, HARD CANDY, MINTS, OR CHEWING TOBACCO  Take a complete shower. Wash your body from the neck down with Hibiclens (chlorhexidine gluconate) soap. Hibiclens soap may be purchased over the counter at the pharmacy. Keep the soap away from your eyes, ears, and mouth. After washing with Hibiclens, rinse thoroughly. You may also use any soap labeled "antibacterial". Shampoo your hair with your regular shampoo.     THE DAY OF SURGERY  Take another " shower with Hibiclens or any antibacterial soap, to reduce the change of infection.  Medications to take the morning of surgery:   Carvedilol (Coreg)_ with a small sip of water. Do not take diuretics or fluid pills.  Diabetic medication instructions will be given by the preop center. They will call you before your surgery.  You may brush your teeth and rinse your mouth, but do not shallow any water.  Do not apply perfume, powder, body lotions or deodorant on the day of surgery.  Do not wear any makeup, including mascara and false eyelashes.  Nail polish should be removed.  Wear comfortable clothes, such as button front shirt and loose-fitting pants.  Leave all jewelry, including body piercings and valuables at home.  Hairpins and clasps must be removed before you enter the operating room.  You may wear glasses, dentures and hearing aids before and after surgery. They may need to be removed before going into the operating room. Contact lenses worn before surgery must be removed before entering the operating room. Please bring a case for your hearing aids, glasses and/or contacts.  Bring any devices you will need after surgery such as crutches or canes.  If you have sleep apnea, please bring your CPAP machine.  If you have an implantable device, such as a pacemaker or AICD, please bring the device information card, if you have one.     If you have any questions or concerns, please don't hesitate to call.     Veda Tubbs RN, MSN  [facility]  [address] [place] [id number]  [phone] fax

## 2019-08-09 ENCOUNTER — TELEPHONE (OUTPATIENT)
Dept: CARDIOTHORACIC SURGERY | Facility: CLINIC | Age: 74
End: 2019-08-09

## 2019-08-11 ENCOUNTER — PATIENT MESSAGE (OUTPATIENT)
Dept: PRIMARY CARE CLINIC | Facility: CLINIC | Age: 74
End: 2019-08-11

## 2019-08-12 ENCOUNTER — ANESTHESIA (OUTPATIENT)
Dept: SURGERY | Facility: HOSPITAL | Age: 74
DRG: 235 | End: 2019-08-12
Payer: MEDICARE

## 2019-08-12 ENCOUNTER — HOSPITAL ENCOUNTER (INPATIENT)
Facility: HOSPITAL | Age: 74
LOS: 9 days | Discharge: HOME HEALTH | DRG: 235 | End: 2019-08-21
Attending: THORACIC SURGERY (CARDIOTHORACIC VASCULAR SURGERY) | Admitting: THORACIC SURGERY (CARDIOTHORACIC VASCULAR SURGERY)
Payer: MEDICARE

## 2019-08-12 DIAGNOSIS — I49.9 ARRHYTHMIA: ICD-10-CM

## 2019-08-12 DIAGNOSIS — I25.10 CORONARY ARTERY DISEASE, ANGINA PRESENCE UNSPECIFIED, UNSPECIFIED VESSEL OR LESION TYPE, UNSPECIFIED WHETHER NATIVE OR TRANSPLANTED HEART: ICD-10-CM

## 2019-08-12 DIAGNOSIS — G47.33 OSA ON CPAP: ICD-10-CM

## 2019-08-12 DIAGNOSIS — N18.30 CKD (CHRONIC KIDNEY DISEASE) STAGE 3, GFR 30-59 ML/MIN: ICD-10-CM

## 2019-08-12 DIAGNOSIS — N18.30 TYPE 2 DIABETES MELLITUS WITH STAGE 3 CHRONIC KIDNEY DISEASE, WITHOUT LONG-TERM CURRENT USE OF INSULIN: ICD-10-CM

## 2019-08-12 DIAGNOSIS — D62 ACUTE BLOOD LOSS ANEMIA: ICD-10-CM

## 2019-08-12 DIAGNOSIS — E66.01 MORBID OBESITY WITH BODY MASS INDEX (BMI) OF 40.0 OR HIGHER: ICD-10-CM

## 2019-08-12 DIAGNOSIS — Z98.890 HISTORY OF HEART SURGERY: ICD-10-CM

## 2019-08-12 DIAGNOSIS — E11.22 TYPE 2 DIABETES MELLITUS WITH STAGE 3 CHRONIC KIDNEY DISEASE, WITHOUT LONG-TERM CURRENT USE OF INSULIN: ICD-10-CM

## 2019-08-12 DIAGNOSIS — I48.91 A-FIB: ICD-10-CM

## 2019-08-12 DIAGNOSIS — Z95.1 S/P CABG (CORONARY ARTERY BYPASS GRAFT): ICD-10-CM

## 2019-08-12 DIAGNOSIS — I25.10 CAD (CORONARY ARTERY DISEASE): ICD-10-CM

## 2019-08-12 LAB
ALLENS TEST: ABNORMAL
ALLENS TEST: NORMAL
ALLENS TEST: NORMAL
ANION GAP SERPL CALC-SCNC: 8 MMOL/L (ref 8–16)
ANISOCYTOSIS BLD QL SMEAR: SLIGHT
APTT BLDCRRT: 32.3 SEC (ref 21–32)
BASOPHILS # BLD AUTO: 0.02 K/UL (ref 0–0.2)
BASOPHILS NFR BLD: 0.2 % (ref 0–1.9)
BLD PROD TYP BPU: NORMAL
BLD PROD TYP BPU: NORMAL
BLOOD UNIT EXPIRATION DATE: NORMAL
BLOOD UNIT EXPIRATION DATE: NORMAL
BLOOD UNIT TYPE CODE: 1700
BLOOD UNIT TYPE CODE: 1700
BLOOD UNIT TYPE: NORMAL
BLOOD UNIT TYPE: NORMAL
BUN SERPL-MCNC: 16 MG/DL (ref 8–23)
CALCIUM SERPL-MCNC: 7.3 MG/DL (ref 8.7–10.5)
CHLORIDE SERPL-SCNC: 109 MMOL/L (ref 95–110)
CO2 SERPL-SCNC: 22 MMOL/L (ref 23–29)
CODING SYSTEM: NORMAL
CODING SYSTEM: NORMAL
CREAT SERPL-MCNC: 0.8 MG/DL (ref 0.5–1.4)
DELSYS: ABNORMAL
DELSYS: NORMAL
DIFFERENTIAL METHOD: ABNORMAL
DISPENSE STATUS: NORMAL
DISPENSE STATUS: NORMAL
EOSINOPHIL # BLD AUTO: 0.1 K/UL (ref 0–0.5)
EOSINOPHIL NFR BLD: 1 % (ref 0–8)
ERYTHROCYTE [DISTWIDTH] IN BLOOD BY AUTOMATED COUNT: 14.6 % (ref 11.5–14.5)
ERYTHROCYTE [SEDIMENTATION RATE] IN BLOOD BY WESTERGREN METHOD: 16 MM/H
ERYTHROCYTE [SEDIMENTATION RATE] IN BLOOD BY WESTERGREN METHOD: 400 MM/H
EST. GFR  (AFRICAN AMERICAN): >60 ML/MIN/1.73 M^2
EST. GFR  (NON AFRICAN AMERICAN): >60 ML/MIN/1.73 M^2
FIO2: 100
FIO2: 40
GLUCOSE SERPL-MCNC: 127 MG/DL (ref 70–110)
GLUCOSE SERPL-MCNC: 140 MG/DL (ref 70–110)
GLUCOSE SERPL-MCNC: 157 MG/DL (ref 70–110)
GLUCOSE SERPL-MCNC: 159 MG/DL (ref 70–110)
GLUCOSE SERPL-MCNC: 172 MG/DL (ref 70–110)
HCO3 UR-SCNC: 19.2 MMOL/L (ref 24–28)
HCO3 UR-SCNC: 21.2 MMOL/L (ref 24–28)
HCO3 UR-SCNC: 22.2 MMOL/L (ref 24–28)
HCO3 UR-SCNC: 23.5 MMOL/L (ref 24–28)
HCO3 UR-SCNC: 23.7 MMOL/L (ref 24–28)
HCO3 UR-SCNC: 23.9 MMOL/L (ref 24–28)
HCO3 UR-SCNC: 24.7 MMOL/L (ref 24–28)
HCO3 UR-SCNC: 28.9 MMOL/L (ref 24–28)
HCT VFR BLD AUTO: 28.5 % (ref 37–48.5)
HCT VFR BLD CALC: 19 %PCV (ref 36–54)
HCT VFR BLD CALC: 20 %PCV (ref 36–54)
HCT VFR BLD CALC: 21 %PCV (ref 36–54)
HCT VFR BLD CALC: 25 %PCV (ref 36–54)
HCT VFR BLD CALC: 26 %PCV (ref 36–54)
HCT VFR BLD CALC: 26 %PCV (ref 36–54)
HGB BLD-MCNC: 9.2 G/DL (ref 12–16)
IMM GRANULOCYTES # BLD AUTO: 0.14 K/UL (ref 0–0.04)
IMM GRANULOCYTES NFR BLD AUTO: 1.3 % (ref 0–0.5)
INR PPP: 1.2 (ref 0.8–1.2)
LDH SERPL L TO P-CCNC: 0.41 MMOL/L (ref 0.36–1.25)
LDH SERPL L TO P-CCNC: 0.7 MMOL/L (ref 0.36–1.25)
LDH SERPL L TO P-CCNC: 1.11 MMOL/L (ref 0.36–1.25)
LYMPHOCYTES # BLD AUTO: 1.3 K/UL (ref 1–4.8)
LYMPHOCYTES NFR BLD: 11.9 % (ref 18–48)
MAGNESIUM SERPL-MCNC: 2.1 MG/DL (ref 1.6–2.6)
MAGNESIUM SERPL-MCNC: 2.1 MG/DL (ref 1.6–2.6)
MCH RBC QN AUTO: 28 PG (ref 27–31)
MCHC RBC AUTO-ENTMCNC: 32.3 G/DL (ref 32–36)
MCV RBC AUTO: 87 FL (ref 82–98)
MIN VOL: 6
MIN VOL: 6
MODE: ABNORMAL
MODE: NORMAL
MONOCYTES # BLD AUTO: 0.6 K/UL (ref 0.3–1)
MONOCYTES NFR BLD: 5.7 % (ref 4–15)
NEUTROPHILS # BLD AUTO: 8.8 K/UL (ref 1.8–7.7)
NEUTROPHILS NFR BLD: 79.9 % (ref 38–73)
NRBC BLD-RTO: 0 /100 WBC
OVALOCYTES BLD QL SMEAR: ABNORMAL
PCO2 BLDA: 34 MMHG (ref 35–45)
PCO2 BLDA: 35.9 MMHG (ref 35–45)
PCO2 BLDA: 39.1 MMHG (ref 35–45)
PCO2 BLDA: 41.1 MMHG (ref 35–45)
PCO2 BLDA: 41.1 MMHG (ref 35–45)
PCO2 BLDA: 45.6 MMHG (ref 35–45)
PCO2 BLDA: 47.4 MMHG (ref 35–45)
PCO2 BLDA: 48.5 MMHG (ref 35–45)
PEEP: 5
PEEP: 5
PEEP: 6
PEEP: 6
PH SMN: 7.3 [PH] (ref 7.35–7.45)
PH SMN: 7.31 [PH] (ref 7.35–7.45)
PH SMN: 7.34 [PH] (ref 7.35–7.45)
PH SMN: 7.36 [PH] (ref 7.35–7.45)
PH SMN: 7.38 [PH] (ref 7.35–7.45)
PH SMN: 7.39 [PH] (ref 7.35–7.45)
PH SMN: 7.39 [PH] (ref 7.35–7.45)
PH SMN: 7.41 [PH] (ref 7.35–7.45)
PHOSPHATE SERPL-MCNC: 3.6 MG/DL (ref 2.7–4.5)
PHOSPHATE SERPL-MCNC: 3.6 MG/DL (ref 2.7–4.5)
PIP: 25
PIP: 25
PLATELET # BLD AUTO: 160 K/UL (ref 150–350)
PLATELET BLD QL SMEAR: ABNORMAL
PMV BLD AUTO: 9.8 FL (ref 9.2–12.9)
PO2 BLDA: 123 MMHG (ref 80–100)
PO2 BLDA: 132 MMHG (ref 80–100)
PO2 BLDA: 176 MMHG (ref 80–100)
PO2 BLDA: 178 MMHG (ref 80–100)
PO2 BLDA: 184 MMHG (ref 80–100)
PO2 BLDA: 210 MMHG (ref 80–100)
PO2 BLDA: 42 MMHG (ref 40–60)
PO2 BLDA: 463 MMHG (ref 80–100)
POC BE: -1 MMOL/L
POC BE: -2 MMOL/L
POC BE: -3 MMOL/L
POC BE: -4 MMOL/L
POC BE: -4 MMOL/L
POC BE: -6 MMOL/L
POC BE: 0 MMOL/L
POC BE: 4 MMOL/L
POC IONIZED CALCIUM: 0.94 MMOL/L (ref 1.06–1.42)
POC IONIZED CALCIUM: 0.99 MMOL/L (ref 1.06–1.42)
POC IONIZED CALCIUM: 0.99 MMOL/L (ref 1.06–1.42)
POC IONIZED CALCIUM: 1.01 MMOL/L (ref 1.06–1.42)
POC IONIZED CALCIUM: 1.11 MMOL/L (ref 1.06–1.42)
POC IONIZED CALCIUM: 1.17 MMOL/L (ref 1.06–1.42)
POC SATURATED O2: 100 % (ref 95–100)
POC SATURATED O2: 77 % (ref 95–100)
POC SATURATED O2: 99 % (ref 95–100)
POC TCO2: 20 MMOL/L (ref 23–27)
POC TCO2: 22 MMOL/L (ref 23–27)
POC TCO2: 23 MMOL/L (ref 23–27)
POC TCO2: 25 MMOL/L (ref 23–27)
POC TCO2: 26 MMOL/L (ref 24–29)
POC TCO2: 30 MMOL/L (ref 23–27)
POCT GLUCOSE: 113 MG/DL (ref 70–110)
POCT GLUCOSE: 123 MG/DL (ref 70–110)
POCT GLUCOSE: 124 MG/DL (ref 70–110)
POCT GLUCOSE: 132 MG/DL (ref 70–110)
POCT GLUCOSE: 148 MG/DL (ref 70–110)
POCT GLUCOSE: 149 MG/DL (ref 70–110)
POCT GLUCOSE: 151 MG/DL (ref 70–110)
POCT GLUCOSE: 161 MG/DL (ref 70–110)
POCT GLUCOSE: 164 MG/DL (ref 70–110)
POCT GLUCOSE: 168 MG/DL (ref 70–110)
POIKILOCYTOSIS BLD QL SMEAR: SLIGHT
POTASSIUM BLD-SCNC: 3.6 MMOL/L (ref 3.5–5.1)
POTASSIUM BLD-SCNC: 3.8 MMOL/L (ref 3.5–5.1)
POTASSIUM BLD-SCNC: 3.9 MMOL/L (ref 3.5–5.1)
POTASSIUM BLD-SCNC: 4.2 MMOL/L (ref 3.5–5.1)
POTASSIUM BLD-SCNC: 4.3 MMOL/L (ref 3.5–5.1)
POTASSIUM BLD-SCNC: 4.5 MMOL/L (ref 3.5–5.1)
POTASSIUM SERPL-SCNC: 4 MMOL/L (ref 3.5–5.1)
POTASSIUM SERPL-SCNC: 4.4 MMOL/L (ref 3.5–5.1)
PROTHROMBIN TIME: 11.8 SEC (ref 9–12.5)
PROVIDER CREDENTIALS: ABNORMAL
PROVIDER CREDENTIALS: NORMAL
RBC # BLD AUTO: 3.28 M/UL (ref 4–5.4)
SAMPLE: ABNORMAL
SAMPLE: NORMAL
SAMPLE: NORMAL
SITE: ABNORMAL
SITE: NORMAL
SITE: NORMAL
SODIUM BLD-SCNC: 137 MMOL/L (ref 136–145)
SODIUM BLD-SCNC: 137 MMOL/L (ref 136–145)
SODIUM BLD-SCNC: 139 MMOL/L (ref 136–145)
SODIUM BLD-SCNC: 139 MMOL/L (ref 136–145)
SODIUM BLD-SCNC: 140 MMOL/L (ref 136–145)
SODIUM BLD-SCNC: 142 MMOL/L (ref 136–145)
SODIUM SERPL-SCNC: 139 MMOL/L (ref 136–145)
SP02: 100
SP02: 100
TRANS ERYTHROCYTES VOL PATIENT: NORMAL ML
TRANS ERYTHROCYTES VOL PATIENT: NORMAL ML
VERBAL RESULT READBACK PERFORMED: YES
VERBAL RESULT READBACK PERFORMED: YES
VT: 16
VT: 40
VT: 400
VT: 400
WBC # BLD AUTO: 11 K/UL (ref 3.9–12.7)

## 2019-08-12 PROCEDURE — S0077 INJECTION, CLINDAMYCIN PHOSP: HCPCS | Performed by: SURGERY

## 2019-08-12 PROCEDURE — 37000008 HC ANESTHESIA 1ST 15 MINUTES: Performed by: THORACIC SURGERY (CARDIOTHORACIC VASCULAR SURGERY)

## 2019-08-12 PROCEDURE — 25000003 PHARM REV CODE 250: Performed by: STUDENT IN AN ORGANIZED HEALTH CARE EDUCATION/TRAINING PROGRAM

## 2019-08-12 PROCEDURE — P9021 RED BLOOD CELLS UNIT: HCPCS

## 2019-08-12 PROCEDURE — 93010 ELECTROCARDIOGRAM REPORT: CPT | Mod: ,,, | Performed by: INTERNAL MEDICINE

## 2019-08-12 PROCEDURE — 36556 PR INSERT NON-TUNNEL CV CATH 5+ YRS OLD: ICD-10-PCS | Mod: 59,,, | Performed by: ANESTHESIOLOGY

## 2019-08-12 PROCEDURE — 86920 COMPATIBILITY TEST SPIN: CPT

## 2019-08-12 PROCEDURE — 63600175 PHARM REV CODE 636 W HCPCS: Performed by: STUDENT IN AN ORGANIZED HEALTH CARE EDUCATION/TRAINING PROGRAM

## 2019-08-12 PROCEDURE — 82330 ASSAY OF CALCIUM: CPT

## 2019-08-12 PROCEDURE — 84100 ASSAY OF PHOSPHORUS: CPT

## 2019-08-12 PROCEDURE — 94640 AIRWAY INHALATION TREATMENT: CPT

## 2019-08-12 PROCEDURE — 94150 VITAL CAPACITY TEST: CPT

## 2019-08-12 PROCEDURE — 99223 1ST HOSP IP/OBS HIGH 75: CPT | Mod: ,,, | Performed by: NURSE PRACTITIONER

## 2019-08-12 PROCEDURE — 36000712 HC OR TIME LEV V 1ST 15 MIN: Performed by: THORACIC SURGERY (CARDIOTHORACIC VASCULAR SURGERY)

## 2019-08-12 PROCEDURE — 25000003 PHARM REV CODE 250: Performed by: THORACIC SURGERY (CARDIOTHORACIC VASCULAR SURGERY)

## 2019-08-12 PROCEDURE — S0028 INJECTION, FAMOTIDINE, 20 MG: HCPCS | Performed by: SURGERY

## 2019-08-12 PROCEDURE — 84132 ASSAY OF SERUM POTASSIUM: CPT

## 2019-08-12 PROCEDURE — 63600175 PHARM REV CODE 636 W HCPCS: Performed by: THORACIC SURGERY (CARDIOTHORACIC VASCULAR SURGERY)

## 2019-08-12 PROCEDURE — 33517 PR CABG, ARTERY-VEIN, SINGLE: ICD-10-PCS | Mod: ,,, | Performed by: THORACIC SURGERY (CARDIOTHORACIC VASCULAR SURGERY)

## 2019-08-12 PROCEDURE — 27000191 HC C-V MONITORING

## 2019-08-12 PROCEDURE — 99222 PR INITIAL HOSPITAL CARE,LEVL II: ICD-10-PCS | Mod: GC,,, | Performed by: SURGERY

## 2019-08-12 PROCEDURE — 25000003 PHARM REV CODE 250: Performed by: SURGERY

## 2019-08-12 PROCEDURE — 33533 CABG ARTERIAL SINGLE: CPT | Mod: ,,, | Performed by: THORACIC SURGERY (CARDIOTHORACIC VASCULAR SURGERY)

## 2019-08-12 PROCEDURE — 27200953 HC CARDIOPLEGIA SYSTEM

## 2019-08-12 PROCEDURE — D9220A PRA ANESTHESIA: Mod: ,,, | Performed by: ANESTHESIOLOGY

## 2019-08-12 PROCEDURE — 36620 PR INSERT CATH,ART,PERCUT,SHORTTERM: ICD-10-PCS | Mod: 59,,, | Performed by: ANESTHESIOLOGY

## 2019-08-12 PROCEDURE — 63600175 PHARM REV CODE 636 W HCPCS: Performed by: NURSE PRACTITIONER

## 2019-08-12 PROCEDURE — 99222 1ST HOSP IP/OBS MODERATE 55: CPT | Mod: GC,,, | Performed by: SURGERY

## 2019-08-12 PROCEDURE — 85014 HEMATOCRIT: CPT

## 2019-08-12 PROCEDURE — 85610 PROTHROMBIN TIME: CPT

## 2019-08-12 PROCEDURE — 85025 COMPLETE CBC W/AUTO DIFF WBC: CPT

## 2019-08-12 PROCEDURE — 63600175 PHARM REV CODE 636 W HCPCS: Mod: JG | Performed by: STUDENT IN AN ORGANIZED HEALTH CARE EDUCATION/TRAINING PROGRAM

## 2019-08-12 PROCEDURE — 93010 EKG 12-LEAD: ICD-10-PCS | Mod: ,,, | Performed by: INTERNAL MEDICINE

## 2019-08-12 PROCEDURE — 33508 ENDOSCOPIC VEIN HARVEST: CPT | Mod: ,,, | Performed by: THORACIC SURGERY (CARDIOTHORACIC VASCULAR SURGERY)

## 2019-08-12 PROCEDURE — 99223 PR INITIAL HOSPITAL CARE,LEVL III: ICD-10-PCS | Mod: ,,, | Performed by: NURSE PRACTITIONER

## 2019-08-12 PROCEDURE — P9045 ALBUMIN (HUMAN), 5%, 250 ML: HCPCS | Mod: JG | Performed by: STUDENT IN AN ORGANIZED HEALTH CARE EDUCATION/TRAINING PROGRAM

## 2019-08-12 PROCEDURE — 20000000 HC ICU ROOM

## 2019-08-12 PROCEDURE — 27100088 HC CELL SAVER

## 2019-08-12 PROCEDURE — D9220A PRA ANESTHESIA: ICD-10-PCS | Mod: ,,, | Performed by: ANESTHESIOLOGY

## 2019-08-12 PROCEDURE — 82962 GLUCOSE BLOOD TEST: CPT | Performed by: THORACIC SURGERY (CARDIOTHORACIC VASCULAR SURGERY)

## 2019-08-12 PROCEDURE — 83735 ASSAY OF MAGNESIUM: CPT

## 2019-08-12 PROCEDURE — 27201423 OPTIME MED/SURG SUP & DEVICES STERILE SUPPLY: Performed by: THORACIC SURGERY (CARDIOTHORACIC VASCULAR SURGERY)

## 2019-08-12 PROCEDURE — 37000009 HC ANESTHESIA EA ADD 15 MINS: Performed by: THORACIC SURGERY (CARDIOTHORACIC VASCULAR SURGERY)

## 2019-08-12 PROCEDURE — 36000713 HC OR TIME LEV V EA ADD 15 MIN: Performed by: THORACIC SURGERY (CARDIOTHORACIC VASCULAR SURGERY)

## 2019-08-12 PROCEDURE — 36620 INSERTION CATHETER ARTERY: CPT | Mod: 59,,, | Performed by: ANESTHESIOLOGY

## 2019-08-12 PROCEDURE — 27201037 HC PRESSURE MONITORING SET UP

## 2019-08-12 PROCEDURE — 94010 BREATHING CAPACITY TEST: CPT

## 2019-08-12 PROCEDURE — 27000175 HC ADULT BYPASS PUMP

## 2019-08-12 PROCEDURE — 80048 BASIC METABOLIC PNL TOTAL CA: CPT

## 2019-08-12 PROCEDURE — 84295 ASSAY OF SERUM SODIUM: CPT

## 2019-08-12 PROCEDURE — 85730 THROMBOPLASTIN TIME PARTIAL: CPT

## 2019-08-12 PROCEDURE — C1729 CATH, DRAINAGE: HCPCS | Performed by: THORACIC SURGERY (CARDIOTHORACIC VASCULAR SURGERY)

## 2019-08-12 PROCEDURE — 82803 BLOOD GASES ANY COMBINATION: CPT

## 2019-08-12 PROCEDURE — 93005 ELECTROCARDIOGRAM TRACING: CPT

## 2019-08-12 PROCEDURE — P9045 ALBUMIN (HUMAN), 5%, 250 ML: HCPCS | Mod: JG | Performed by: THORACIC SURGERY (CARDIOTHORACIC VASCULAR SURGERY)

## 2019-08-12 PROCEDURE — 25000003 PHARM REV CODE 250

## 2019-08-12 PROCEDURE — 27000221 HC OXYGEN, UP TO 24 HOURS

## 2019-08-12 PROCEDURE — 37799 UNLISTED PX VASCULAR SURGERY: CPT

## 2019-08-12 PROCEDURE — 33517 CABG ARTERY-VEIN SINGLE: CPT | Mod: ,,, | Performed by: THORACIC SURGERY (CARDIOTHORACIC VASCULAR SURGERY)

## 2019-08-12 PROCEDURE — 99900035 HC TECH TIME PER 15 MIN (STAT)

## 2019-08-12 PROCEDURE — 25000242 PHARM REV CODE 250 ALT 637 W/ HCPCS: Performed by: THORACIC SURGERY (CARDIOTHORACIC VASCULAR SURGERY)

## 2019-08-12 PROCEDURE — 85520 HEPARIN ASSAY: CPT

## 2019-08-12 PROCEDURE — 36592 COLLECT BLOOD FROM PICC: CPT

## 2019-08-12 PROCEDURE — 33508 PR ENDOSCOPY W/VIDEO-ASST VEIN HARVEST,CABG: ICD-10-PCS | Mod: ,,, | Performed by: THORACIC SURGERY (CARDIOTHORACIC VASCULAR SURGERY)

## 2019-08-12 PROCEDURE — 83605 ASSAY OF LACTIC ACID: CPT

## 2019-08-12 PROCEDURE — 94761 N-INVAS EAR/PLS OXIMETRY MLT: CPT

## 2019-08-12 PROCEDURE — 36556 INSERT NON-TUNNEL CV CATH: CPT | Mod: 59,,, | Performed by: ANESTHESIOLOGY

## 2019-08-12 PROCEDURE — 33533 PR CABG, ARTERIAL, SINGLE: ICD-10-PCS | Mod: ,,, | Performed by: THORACIC SURGERY (CARDIOTHORACIC VASCULAR SURGERY)

## 2019-08-12 PROCEDURE — 27000445 HC TEMPORARY PACEMAKER LEADS

## 2019-08-12 RX ORDER — MAGNESIUM SULFATE HEPTAHYDRATE 40 MG/ML
4 INJECTION, SOLUTION INTRAVENOUS
Status: DISCONTINUED | OUTPATIENT
Start: 2019-08-12 | End: 2019-08-13

## 2019-08-12 RX ORDER — ALBUMIN HUMAN 50 G/1000ML
12.5 SOLUTION INTRAVENOUS ONCE
Status: DISCONTINUED | OUTPATIENT
Start: 2019-08-12 | End: 2019-08-14

## 2019-08-12 RX ORDER — ONDANSETRON 2 MG/ML
INJECTION INTRAMUSCULAR; INTRAVENOUS
Status: DISCONTINUED | OUTPATIENT
Start: 2019-08-12 | End: 2019-08-12

## 2019-08-12 RX ORDER — MUPIROCIN 20 MG/G
1 OINTMENT TOPICAL
Status: COMPLETED | OUTPATIENT
Start: 2019-08-12 | End: 2019-08-12

## 2019-08-12 RX ORDER — EPINEPHRINE 0.1 MG/ML
INJECTION INTRAVENOUS
Status: DISCONTINUED | OUTPATIENT
Start: 2019-08-12 | End: 2019-08-12

## 2019-08-12 RX ORDER — ACETAMINOPHEN 10 MG/ML
INJECTION, SOLUTION INTRAVENOUS
Status: DISCONTINUED | OUTPATIENT
Start: 2019-08-12 | End: 2019-08-12

## 2019-08-12 RX ORDER — LIDOCAINE HYDROCHLORIDE 10 MG/ML
1 INJECTION, SOLUTION EPIDURAL; INFILTRATION; INTRACAUDAL; PERINEURAL
Status: COMPLETED | OUTPATIENT
Start: 2019-08-12 | End: 2019-08-12

## 2019-08-12 RX ORDER — DOCUSATE SODIUM 100 MG/1
100 CAPSULE, LIQUID FILLED ORAL 2 TIMES DAILY
Status: DISCONTINUED | OUTPATIENT
Start: 2019-08-12 | End: 2019-08-21 | Stop reason: HOSPADM

## 2019-08-12 RX ORDER — LIDOCAINE HCL/PF 100 MG/5ML
SYRINGE (ML) INTRAVENOUS
Status: DISCONTINUED | OUTPATIENT
Start: 2019-08-12 | End: 2019-08-12

## 2019-08-12 RX ORDER — KETAMINE HYDROCHLORIDE 10 MG/ML
INJECTION, SOLUTION INTRAMUSCULAR; INTRAVENOUS
Status: DISCONTINUED | OUTPATIENT
Start: 2019-08-12 | End: 2019-08-12

## 2019-08-12 RX ORDER — NICARDIPINE HYDROCHLORIDE 0.2 MG/ML
2.5 INJECTION INTRAVENOUS CONTINUOUS
Status: DISCONTINUED | OUTPATIENT
Start: 2019-08-12 | End: 2019-08-13

## 2019-08-12 RX ORDER — NOREPINEPHRINE BITARTRATE 1 MG/ML
INJECTION, SOLUTION INTRAVENOUS
Status: DISCONTINUED | OUTPATIENT
Start: 2019-08-12 | End: 2019-08-12

## 2019-08-12 RX ORDER — FENTANYL CITRATE 50 UG/ML
25 INJECTION, SOLUTION INTRAMUSCULAR; INTRAVENOUS
Status: DISCONTINUED | OUTPATIENT
Start: 2019-08-12 | End: 2019-08-13

## 2019-08-12 RX ORDER — NITROGLYCERIN 5 MG/ML
INJECTION, SOLUTION INTRAVENOUS
Status: DISCONTINUED | OUTPATIENT
Start: 2019-08-12 | End: 2019-08-12

## 2019-08-12 RX ORDER — PANTOPRAZOLE SODIUM 40 MG/1
40 TABLET, DELAYED RELEASE ORAL
Status: DISCONTINUED | OUTPATIENT
Start: 2019-08-13 | End: 2019-08-21 | Stop reason: HOSPADM

## 2019-08-12 RX ORDER — INDOMETHACIN 25 MG/1
50 CAPSULE ORAL ONCE
Status: COMPLETED | OUTPATIENT
Start: 2019-08-12 | End: 2019-08-12

## 2019-08-12 RX ORDER — FENTANYL CITRATE 50 UG/ML
50 INJECTION, SOLUTION INTRAMUSCULAR; INTRAVENOUS
Status: DISCONTINUED | OUTPATIENT
Start: 2019-08-19 | End: 2019-08-13

## 2019-08-12 RX ORDER — HEPARIN SODIUM 5000 [USP'U]/ML
INJECTION, SOLUTION INTRAVENOUS; SUBCUTANEOUS
Status: DISCONTINUED | OUTPATIENT
Start: 2019-08-12 | End: 2019-08-12

## 2019-08-12 RX ORDER — POTASSIUM CHLORIDE 29.8 MG/ML
40 INJECTION INTRAVENOUS
Status: DISCONTINUED | OUTPATIENT
Start: 2019-08-12 | End: 2019-08-13

## 2019-08-12 RX ORDER — ATORVASTATIN CALCIUM 20 MG/1
40 TABLET, FILM COATED ORAL NIGHTLY
Status: DISCONTINUED | OUTPATIENT
Start: 2019-08-12 | End: 2019-08-21 | Stop reason: HOSPADM

## 2019-08-12 RX ORDER — ASPIRIN 325 MG
325 TABLET ORAL DAILY
Status: DISCONTINUED | OUTPATIENT
Start: 2019-08-12 | End: 2019-08-12

## 2019-08-12 RX ORDER — PROTAMINE SULFATE 10 MG/ML
INJECTION, SOLUTION INTRAVENOUS
Status: DISCONTINUED | OUTPATIENT
Start: 2019-08-12 | End: 2019-08-12

## 2019-08-12 RX ORDER — BISACODYL 10 MG
10 SUPPOSITORY, RECTAL RECTAL EVERY 12 HOURS PRN
Status: DISCONTINUED | OUTPATIENT
Start: 2019-08-12 | End: 2019-08-21 | Stop reason: HOSPADM

## 2019-08-12 RX ORDER — MAGNESIUM SULFATE HEPTAHYDRATE 40 MG/ML
2 INJECTION, SOLUTION INTRAVENOUS
Status: DISCONTINUED | OUTPATIENT
Start: 2019-08-12 | End: 2019-08-13

## 2019-08-12 RX ORDER — MIDAZOLAM HYDROCHLORIDE 1 MG/ML
INJECTION, SOLUTION INTRAMUSCULAR; INTRAVENOUS
Status: DISCONTINUED | OUTPATIENT
Start: 2019-08-12 | End: 2019-08-12

## 2019-08-12 RX ORDER — PROPOFOL 10 MG/ML
VIAL (ML) INTRAVENOUS CONTINUOUS PRN
Status: DISCONTINUED | OUTPATIENT
Start: 2019-08-12 | End: 2019-08-12

## 2019-08-12 RX ORDER — POTASSIUM CHLORIDE 14.9 MG/ML
60 INJECTION INTRAVENOUS
Status: DISCONTINUED | OUTPATIENT
Start: 2019-08-12 | End: 2019-08-13

## 2019-08-12 RX ORDER — ASPIRIN 325 MG
325 TABLET, DELAYED RELEASE (ENTERIC COATED) ORAL DAILY
Status: DISCONTINUED | OUTPATIENT
Start: 2019-08-13 | End: 2019-08-21 | Stop reason: HOSPADM

## 2019-08-12 RX ORDER — POTASSIUM CHLORIDE 14.9 MG/ML
20 INJECTION INTRAVENOUS
Status: DISCONTINUED | OUTPATIENT
Start: 2019-08-12 | End: 2019-08-13

## 2019-08-12 RX ORDER — ASPIRIN 300 MG/1
300 SUPPOSITORY RECTAL ONCE AS NEEDED
Status: DISCONTINUED | OUTPATIENT
Start: 2019-08-12 | End: 2019-08-13

## 2019-08-12 RX ORDER — PANTOPRAZOLE SODIUM 40 MG/10ML
40 INJECTION, POWDER, LYOPHILIZED, FOR SOLUTION INTRAVENOUS DAILY
Status: DISCONTINUED | OUTPATIENT
Start: 2019-08-12 | End: 2019-08-12 | Stop reason: RX

## 2019-08-12 RX ORDER — CLINDAMYCIN PHOSPHATE 900 MG/50ML
900 INJECTION, SOLUTION INTRAVENOUS
Status: COMPLETED | OUTPATIENT
Start: 2019-08-12 | End: 2019-08-13

## 2019-08-12 RX ORDER — OXYCODONE HYDROCHLORIDE 5 MG/1
5 TABLET ORAL EVERY 4 HOURS PRN
Status: DISCONTINUED | OUTPATIENT
Start: 2019-08-12 | End: 2019-08-21 | Stop reason: HOSPADM

## 2019-08-12 RX ORDER — IPRATROPIUM BROMIDE AND ALBUTEROL SULFATE 2.5; .5 MG/3ML; MG/3ML
3 SOLUTION RESPIRATORY (INHALATION) EVERY 4 HOURS PRN
Status: ACTIVE | OUTPATIENT
Start: 2019-08-12 | End: 2019-08-13

## 2019-08-12 RX ORDER — OXYCODONE HYDROCHLORIDE 5 MG/1
10 TABLET ORAL EVERY 4 HOURS PRN
Status: DISCONTINUED | OUTPATIENT
Start: 2019-08-12 | End: 2019-08-21 | Stop reason: HOSPADM

## 2019-08-12 RX ORDER — ROCURONIUM BROMIDE 10 MG/ML
INJECTION, SOLUTION INTRAVENOUS
Status: DISCONTINUED | OUTPATIENT
Start: 2019-08-12 | End: 2019-08-12

## 2019-08-12 RX ORDER — ESMOLOL HYDROCHLORIDE 10 MG/ML
INJECTION INTRAVENOUS
Status: DISCONTINUED | OUTPATIENT
Start: 2019-08-12 | End: 2019-08-12

## 2019-08-12 RX ORDER — TRANEXAMIC ACID 100 MG/ML
INJECTION, SOLUTION INTRAVENOUS CONTINUOUS PRN
Status: DISCONTINUED | OUTPATIENT
Start: 2019-08-12 | End: 2019-08-12

## 2019-08-12 RX ORDER — POTASSIUM CHLORIDE 20 MEQ/1
20 TABLET, EXTENDED RELEASE ORAL EVERY 12 HOURS
Status: DISCONTINUED | OUTPATIENT
Start: 2019-08-12 | End: 2019-08-16

## 2019-08-12 RX ORDER — IPRATROPIUM BROMIDE AND ALBUTEROL SULFATE 2.5; .5 MG/3ML; MG/3ML
3 SOLUTION RESPIRATORY (INHALATION) EVERY 4 HOURS
Status: DISPENSED | OUTPATIENT
Start: 2019-08-12 | End: 2019-08-13

## 2019-08-12 RX ORDER — FAMOTIDINE 10 MG/ML
20 INJECTION INTRAVENOUS 2 TIMES DAILY
Status: DISCONTINUED | OUTPATIENT
Start: 2019-08-12 | End: 2019-08-13

## 2019-08-12 RX ORDER — ACETAMINOPHEN 325 MG/1
650 TABLET ORAL EVERY 4 HOURS PRN
Status: DISCONTINUED | OUTPATIENT
Start: 2019-08-12 | End: 2019-08-13

## 2019-08-12 RX ORDER — POLYETHYLENE GLYCOL 3350 17 G/17G
17 POWDER, FOR SOLUTION ORAL DAILY
Status: DISCONTINUED | OUTPATIENT
Start: 2019-08-13 | End: 2019-08-21 | Stop reason: HOSPADM

## 2019-08-12 RX ORDER — ALBUMIN HUMAN 50 G/1000ML
500 SOLUTION INTRAVENOUS ONCE AS NEEDED
Status: COMPLETED | OUTPATIENT
Start: 2019-08-12 | End: 2019-08-12

## 2019-08-12 RX ORDER — PAPAVERINE HYDROCHLORIDE 30 MG/ML
INJECTION INTRAMUSCULAR; INTRAVENOUS
Status: DISCONTINUED | OUTPATIENT
Start: 2019-08-12 | End: 2019-08-12 | Stop reason: HOSPADM

## 2019-08-12 RX ORDER — MUPIROCIN 20 MG/G
1 OINTMENT TOPICAL 2 TIMES DAILY
Status: DISPENSED | OUTPATIENT
Start: 2019-08-12 | End: 2019-08-17

## 2019-08-12 RX ORDER — FENTANYL CITRATE 50 UG/ML
INJECTION, SOLUTION INTRAMUSCULAR; INTRAVENOUS
Status: DISCONTINUED | OUTPATIENT
Start: 2019-08-12 | End: 2019-08-12

## 2019-08-12 RX ORDER — ONDANSETRON 2 MG/ML
4 INJECTION INTRAMUSCULAR; INTRAVENOUS EVERY 12 HOURS PRN
Status: DISCONTINUED | OUTPATIENT
Start: 2019-08-12 | End: 2019-08-21 | Stop reason: HOSPADM

## 2019-08-12 RX ORDER — SODIUM CHLORIDE 0.9 % (FLUSH) 0.9 %
10 SYRINGE (ML) INJECTION
Status: DISCONTINUED | OUTPATIENT
Start: 2019-08-12 | End: 2019-08-21 | Stop reason: HOSPADM

## 2019-08-12 RX ORDER — HEPARIN SODIUM 1000 [USP'U]/ML
INJECTION, SOLUTION INTRAVENOUS; SUBCUTANEOUS
Status: DISCONTINUED | OUTPATIENT
Start: 2019-08-12 | End: 2019-08-12 | Stop reason: HOSPADM

## 2019-08-12 RX ORDER — METOCLOPRAMIDE HYDROCHLORIDE 5 MG/ML
5 INJECTION INTRAMUSCULAR; INTRAVENOUS EVERY 6 HOURS PRN
Status: DISCONTINUED | OUTPATIENT
Start: 2019-08-12 | End: 2019-08-21 | Stop reason: HOSPADM

## 2019-08-12 RX ORDER — DEXTROSE MONOHYDRATE, SODIUM CHLORIDE, AND POTASSIUM CHLORIDE 50; 1.49; 4.5 G/1000ML; G/1000ML; G/1000ML
INJECTION, SOLUTION INTRAVENOUS CONTINUOUS
Status: DISCONTINUED | OUTPATIENT
Start: 2019-08-12 | End: 2019-08-13

## 2019-08-12 RX ORDER — PROPOFOL 10 MG/ML
VIAL (ML) INTRAVENOUS
Status: DISCONTINUED | OUTPATIENT
Start: 2019-08-12 | End: 2019-08-12

## 2019-08-12 RX ORDER — ASPIRIN 325 MG
325 TABLET ORAL DAILY
Status: DISCONTINUED | OUTPATIENT
Start: 2019-08-13 | End: 2019-08-12 | Stop reason: SDUPTHER

## 2019-08-12 RX ORDER — PHENYLEPHRINE HYDROCHLORIDE 10 MG/ML
INJECTION INTRAVENOUS
Status: DISCONTINUED | OUTPATIENT
Start: 2019-08-12 | End: 2019-08-12

## 2019-08-12 RX ORDER — SODIUM CHLORIDE 9 MG/ML
INJECTION, SOLUTION INTRAVENOUS CONTINUOUS
Status: DISCONTINUED | OUTPATIENT
Start: 2019-08-12 | End: 2019-08-13

## 2019-08-12 RX ORDER — METOPROLOL TARTRATE 25 MG/1
25 TABLET, FILM COATED ORAL
Status: DISCONTINUED | OUTPATIENT
Start: 2019-08-12 | End: 2019-08-12 | Stop reason: HOSPADM

## 2019-08-12 RX ORDER — INDOMETHACIN 25 MG/1
CAPSULE ORAL
Status: COMPLETED
Start: 2019-08-12 | End: 2019-08-12

## 2019-08-12 RX ORDER — NOREPINEPHRINE BITARTRATE/D5W 4MG/250ML
0.02 PLASTIC BAG, INJECTION (ML) INTRAVENOUS CONTINUOUS
Status: DISCONTINUED | OUTPATIENT
Start: 2019-08-12 | End: 2019-08-13

## 2019-08-12 RX ORDER — TRANEXAMIC ACID 100 MG/ML
INJECTION, SOLUTION INTRAVENOUS
Status: DISCONTINUED | OUTPATIENT
Start: 2019-08-12 | End: 2019-08-12

## 2019-08-12 RX ORDER — ASPIRIN 325 MG
325 TABLET ORAL ONCE
Status: COMPLETED | OUTPATIENT
Start: 2019-08-12 | End: 2019-08-12

## 2019-08-12 RX ADMIN — PHENYLEPHRINE HYDROCHLORIDE 100 MCG: 10 INJECTION INTRAVENOUS at 12:08

## 2019-08-12 RX ADMIN — EPINEPHRINE 10 MCG: 0.1 INJECTION, SOLUTION ENDOTRACHEAL; INTRACARDIAC; INTRAVENOUS at 12:08

## 2019-08-12 RX ADMIN — PROTAMINE SULFATE 25 MG: 10 INJECTION, SOLUTION INTRAVENOUS at 01:08

## 2019-08-12 RX ADMIN — OXYCODONE HYDROCHLORIDE 10 MG: 10 TABLET ORAL at 07:08

## 2019-08-12 RX ADMIN — PHENYLEPHRINE HYDROCHLORIDE 100 MCG: 10 INJECTION INTRAVENOUS at 07:08

## 2019-08-12 RX ADMIN — PHENYLEPHRINE HYDROCHLORIDE 100 MCG: 10 INJECTION INTRAVENOUS at 11:08

## 2019-08-12 RX ADMIN — PHENYLEPHRINE HYDROCHLORIDE 100 MCG: 10 INJECTION INTRAVENOUS at 01:08

## 2019-08-12 RX ADMIN — PHENYLEPHRINE HYDROCHLORIDE 100 MCG: 10 INJECTION INTRAVENOUS at 09:08

## 2019-08-12 RX ADMIN — TRANEXAMIC ACID 1 MG/KG/HR: 100 INJECTION, SOLUTION INTRAVENOUS at 08:08

## 2019-08-12 RX ADMIN — NOREPINEPHRINE BITARTRATE 8 MCG: 1 INJECTION, SOLUTION, CONCENTRATE INTRAVENOUS at 01:08

## 2019-08-12 RX ADMIN — ESMOLOL HYDROCHLORIDE 10 MG: 10 INJECTION INTRAVENOUS at 07:08

## 2019-08-12 RX ADMIN — SUGAMMADEX 200 MG: 100 INJECTION, SOLUTION INTRAVENOUS at 01:08

## 2019-08-12 RX ADMIN — MIDAZOLAM HYDROCHLORIDE 1 MG: 1 INJECTION, SOLUTION INTRAMUSCULAR; INTRAVENOUS at 07:08

## 2019-08-12 RX ADMIN — CALCIUM CHLORIDE 0.5 G: 100 INJECTION, SOLUTION INTRAVENOUS at 01:08

## 2019-08-12 RX ADMIN — PROTAMINE SULFATE 40 MG: 10 INJECTION, SOLUTION INTRAVENOUS at 01:08

## 2019-08-12 RX ADMIN — NOREPINEPHRINE BITARTRATE 16 MCG: 1 INJECTION, SOLUTION, CONCENTRATE INTRAVENOUS at 12:08

## 2019-08-12 RX ADMIN — PROTAMINE SULFATE 25 MG: 10 INJECTION, SOLUTION INTRAVENOUS at 12:08

## 2019-08-12 RX ADMIN — CLINDAMYCIN IN 5 PERCENT DEXTROSE 900 MG: 18 INJECTION, SOLUTION INTRAVENOUS at 11:08

## 2019-08-12 RX ADMIN — NICARDIPINE HYDROCHLORIDE 2.5 MG/HR: 0.2 INJECTION, SOLUTION INTRAVENOUS at 08:08

## 2019-08-12 RX ADMIN — HEPARIN SODIUM 10000 UNITS: 5000 INJECTION, SOLUTION INTRAVENOUS; SUBCUTANEOUS at 11:08

## 2019-08-12 RX ADMIN — PHENYLEPHRINE HYDROCHLORIDE 100 MCG: 10 INJECTION INTRAVENOUS at 02:08

## 2019-08-12 RX ADMIN — FENTANYL CITRATE 100 MCG: 50 INJECTION, SOLUTION INTRAMUSCULAR; INTRAVENOUS at 07:08

## 2019-08-12 RX ADMIN — SODIUM CHLORIDE, SODIUM GLUCONATE, SODIUM ACETATE, POTASSIUM CHLORIDE, MAGNESIUM CHLORIDE, SODIUM PHOSPHATE, DIBASIC, AND POTASSIUM PHOSPHATE: .53; .5; .37; .037; .03; .012; .00082 INJECTION, SOLUTION INTRAVENOUS at 11:08

## 2019-08-12 RX ADMIN — KETAMINE HYDROCHLORIDE 30 MG: 10 INJECTION, SOLUTION INTRAMUSCULAR; INTRAVENOUS at 07:08

## 2019-08-12 RX ADMIN — POTASSIUM CHLORIDE 20 MEQ: 1500 TABLET, EXTENDED RELEASE ORAL at 09:08

## 2019-08-12 RX ADMIN — NITROGLYCERIN 50 MCG: 5 INJECTION, SOLUTION INTRAVENOUS at 11:08

## 2019-08-12 RX ADMIN — ONDANSETRON 4 MG: 2 INJECTION INTRAMUSCULAR; INTRAVENOUS at 01:08

## 2019-08-12 RX ADMIN — ASPIRIN 325 MG ORAL TABLET 325 MG: 325 PILL ORAL at 07:08

## 2019-08-12 RX ADMIN — PROPOFOL 130 MG: 10 INJECTION, EMULSION INTRAVENOUS at 07:08

## 2019-08-12 RX ADMIN — CLINDAMYCIN IN 5 PERCENT DEXTROSE 900 MG: 18 INJECTION, SOLUTION INTRAVENOUS at 02:08

## 2019-08-12 RX ADMIN — ALBUMIN (HUMAN) 500 ML: 12.5 SOLUTION INTRAVENOUS at 05:08

## 2019-08-12 RX ADMIN — ACETAMINOPHEN 1000 MG: 10 INJECTION, SOLUTION INTRAVENOUS at 08:08

## 2019-08-12 RX ADMIN — INDOMETHACIN 50 MEQ: 25 CAPSULE ORAL at 05:08

## 2019-08-12 RX ADMIN — ATORVASTATIN CALCIUM 40 MG: 20 TABLET, FILM COATED ORAL at 09:08

## 2019-08-12 RX ADMIN — DEXTROSE, SODIUM CHLORIDE, AND POTASSIUM CHLORIDE: 5; .45; .15 INJECTION INTRAVENOUS at 04:08

## 2019-08-12 RX ADMIN — PHENYLEPHRINE HYDROCHLORIDE 50 MCG: 10 INJECTION INTRAVENOUS at 09:08

## 2019-08-12 RX ADMIN — LIDOCAINE HYDROCHLORIDE 10 MG: 10 INJECTION, SOLUTION EPIDURAL; INFILTRATION; INTRACAUDAL; PERINEURAL at 06:08

## 2019-08-12 RX ADMIN — SODIUM CHLORIDE 1.7 UNITS/HR: 9 INJECTION, SOLUTION INTRAVENOUS at 05:08

## 2019-08-12 RX ADMIN — MUPIROCIN 1 G: 20 OINTMENT TOPICAL at 09:08

## 2019-08-12 RX ADMIN — METHADONE HYDROCHLORIDE 30 MG: 10 INJECTION, SOLUTION INTRAMUSCULAR; INTRAVENOUS; SUBCUTANEOUS at 08:08

## 2019-08-12 RX ADMIN — SODIUM CHLORIDE, SODIUM GLUCONATE, SODIUM ACETATE, POTASSIUM CHLORIDE, MAGNESIUM CHLORIDE, SODIUM PHOSPHATE, DIBASIC, AND POTASSIUM PHOSPHATE: .53; .5; .37; .037; .03; .012; .00082 INJECTION, SOLUTION INTRAVENOUS at 08:08

## 2019-08-12 RX ADMIN — SODIUM CHLORIDE 2 UNITS/HR: 9 INJECTION, SOLUTION INTRAVENOUS at 09:08

## 2019-08-12 RX ADMIN — SODIUM CHLORIDE: 0.9 INJECTION, SOLUTION INTRAVENOUS at 07:08

## 2019-08-12 RX ADMIN — IPRATROPIUM BROMIDE AND ALBUTEROL SULFATE 3 ML: .5; 3 SOLUTION RESPIRATORY (INHALATION) at 08:08

## 2019-08-12 RX ADMIN — ROCURONIUM BROMIDE 80 MG: 10 INJECTION, SOLUTION INTRAVENOUS at 07:08

## 2019-08-12 RX ADMIN — HEPARIN SODIUM 5000 UNITS: 5000 INJECTION, SOLUTION INTRAVENOUS; SUBCUTANEOUS at 11:08

## 2019-08-12 RX ADMIN — CLINDAMYCIN IN 5 PERCENT DEXTROSE 900 MG: 18 INJECTION, SOLUTION INTRAVENOUS at 08:08

## 2019-08-12 RX ADMIN — KETAMINE HYDROCHLORIDE 20 MG: 10 INJECTION, SOLUTION INTRAMUSCULAR; INTRAVENOUS at 10:08

## 2019-08-12 RX ADMIN — PROTAMINE SULFATE 50 MG: 10 INJECTION, SOLUTION INTRAVENOUS at 12:08

## 2019-08-12 RX ADMIN — LIDOCAINE HYDROCHLORIDE 100 MG: 20 INJECTION, SOLUTION INTRAVENOUS at 07:08

## 2019-08-12 RX ADMIN — POTASSIUM CHLORIDE 40 MEQ: 400 INJECTION, SOLUTION INTRAVENOUS at 05:08

## 2019-08-12 RX ADMIN — PROPOFOL 50 MCG/KG/MIN: 10 INJECTION, EMULSION INTRAVENOUS at 02:08

## 2019-08-12 RX ADMIN — SODIUM BICARBONATE 50 MEQ: 84 INJECTION, SOLUTION INTRAVENOUS at 05:08

## 2019-08-12 RX ADMIN — FAMOTIDINE 20 MG: 10 INJECTION, SOLUTION INTRAVENOUS at 05:08

## 2019-08-12 RX ADMIN — TRANEXAMIC ACID 1000 MG: 100 INJECTION, SOLUTION INTRAVENOUS at 08:08

## 2019-08-12 RX ADMIN — ONDANSETRON 4 MG: 2 INJECTION INTRAMUSCULAR; INTRAVENOUS at 09:08

## 2019-08-12 RX ADMIN — FENTANYL CITRATE 100 MCG: 50 INJECTION, SOLUTION INTRAMUSCULAR; INTRAVENOUS at 08:08

## 2019-08-12 RX ADMIN — NOREPINEPHRINE BITARTRATE 0.02 MCG/KG/MIN: 1 INJECTION, SOLUTION, CONCENTRATE INTRAVENOUS at 09:08

## 2019-08-12 RX ADMIN — SODIUM CHLORIDE: 0.9 INJECTION, SOLUTION INTRAVENOUS at 06:08

## 2019-08-12 RX ADMIN — MUPIROCIN 1 G: 20 OINTMENT TOPICAL at 06:08

## 2019-08-12 NOTE — TRANSFER OF CARE
"Anesthesia Transfer of Care Note    Patient: Kaylee Romero    Procedure(s) Performed: Procedure(s) (LRB):  CORONARY ARTERY BYPASS GRAFT (CABG)X3 (N/A)  SURGICAL PROCUREMENT, VEIN, ENDOSCOPIC (Left)    Patient location: ICU    Anesthesia Type: general    Transport from OR: Transported from OR intubated on 100% O2 by AMBU with assisted ventilation    Post pain: adequate analgesia    Post assessment: no apparent anesthetic complications and tolerated procedure well    Post vital signs: stable    Level of consciousness: sedated    Nausea/Vomiting: no nausea/vomiting    Complications: none    Transfer of care protocol was followed      Last vitals:   Visit Vitals  BP (!) 141/65 (BP Location: Right arm, Patient Position: Lying)   Pulse 88   Temp 36.5 °C (97.7 °F) (Oral)   Resp 18   Ht 5' 2" (1.575 m)   Wt 103.9 kg (229 lb)   LMP 01/09/2001 (Approximate)   SpO2 100%   Breastfeeding? No   BMI 41.88 kg/m²     "

## 2019-08-12 NOTE — BRIEF OP NOTE
[facility]  Brief Operative Note    SUMMARY     Surgery Date: 8/12/2019     Surgeon(s) and Role:     * Peterson Ventura MD - Primary     * Rom Cespedes MD - Fellow    Assisting Surgeon: None    Pre-op Diagnosis:  NSTEMI (non-ST elevated myocardial infarction) [I21.4]    Post-op Diagnosis:  Post-Op Diagnosis Codes:     * NSTEMI (non-ST elevated myocardial infarction) [I21.4]    Procedure(s) (LRB):  CORONARY ARTERY BYPASS GRAFT (CABG)X3 (N/A)  SURGICAL PROCUREMENT, VEIN, ENDOSCOPIC (Left)    Anesthesia: General    Description of Procedure: 2v CABG; LIMA-LAD; SVG-OM1    Description of the findings of the procedure: PDA/RCA poor target    Admin: 2.1L fluids; 2u pRBC; 260cc cell saver  UOP: 600cc    Chest tubes x3: 1 left pleural; 1 mediastinal; 1 right pleural         Specimens:   Specimen (12h ago, onward)    None

## 2019-08-12 NOTE — CONSULTS
[facility]  Endocrinology  Diabetes Consult Note    Consult Requested by: Peterson Ventura MD   Reason for admit: S/P CABG (coronary artery bypass graft)    HISTORY OF PRESENT ILLNESS:  Reason for Consult: Management of T2DM, Hyperglycemia     Surgical Procedure and Date: CABG 8/12/19    Diabetes diagnosis year: YSABEL    Lab Results   Component Value Date    HGBA1C 7.5 (H) 08/07/2019       Home Diabetes Medications: Levemir 8 q HS, Glipizide 20 mg daily, Trulicity 1.5 mg SQ once weekly (per chart review)    How often checking glucose at home? YSABEL  BG readings on regimen: YSABEL  Hypoglycemia on the regimen?  YSABEL  Missed doses on regimen? YSABEL    Diabetes Complications include:     YSABEL    Complicating diabetes co morbidities:   History of MI, KAMRAN and CKD      HPI:   Patient is a 74 y.o. female with a diagnosis of DM2, KAMRAN, CKD, and CAD, who is s/p CABG on 8/12/19.  Patient has a complex diabetic history, on triple therapy.  Last seen in [facility] [place] by MAURICIO Pritchard NP on 5/23/19.  Positive family history of DM (father).  Endocrinology consulted for DM/BG management.            Interval HPI:   Overnight events: s/p CABG transferred from OR to SICU intubated.  BG well controlled on intensive insulin protocol, rates ranging from 2-6.6 u/hr.  On pressors and IV antibiotics.  Eating:   NPO  Nausea: No  Hypoglycemia and intervention: No  Fever: No  TPN and/or TF: No    PMH, PSH, FH, SH reviewed     ROS:  Unable to obtain due to: Sedation,Intubation,Altered mental status,Critical illness,Reviewed ROS from note dated 8/7/19 by Peterson Ventura MD.        Current Medications and/or Treatments Impacting Glycemic Control  Immunotherapy:    Immunosuppressants     None        Steroids:   Hormones (From admission, onward)    None        Pressors:    Autonomic Drugs (From admission, onward)    None        Hyperglycemia/Diabetes Medications:   Antihyperglycemics (From admission, onward)    Start     Stop  Route Frequency Ordered    08/12/19 1500  insulin regular (Humulin R) 100 Units in sodium chloride 0.9% 100 mL infusion     Question:  Insulin rate changes (DO NOT MODIFY ANSWER)  Answer:  [email or web address]\epic\Images\Pharmacy\InsulinInfusions\CTS INSULIN [id number].pdf    -- IV Continuous 08/12/19 1459             PHYSICAL EXAMINATION:  Vitals:    08/12/19 0549   BP: (!) 141/65   Pulse: 88   Resp: 18   Temp: 97.7 °F (36.5 °C)     Body mass index is 41.88 kg/m².    Physical Exam     Constitutional: Well developed, obese, NAD.  ENT: External ears no masses with nose patent  Neck: Supple; trachea midline  Cardiovascular: Normal heart sounds, no LE edema. DP +2 bilaterally.  Lungs: Normal effort; lungs anterior bilaterally clear to auscultation.  Intubated on a ventilator.  Abdomen: Soft, no masses, no hernias.  Hypoactive BS noted.  MS: No clubbing or cyanosis of nails noted; unable to assess gait.  Skin: No rashes, lesions, or ulcers; no nodules.  Injection sites are ok. No lipo hypertropthy or atrophy.  Mid-sternal incision with telfa island dressing, CDI.  CT x 2.  LLE wrapped with ACE wrap.  Psychiatric: YSABEL  Neurological: YSABEL  Foot: Nails in fair condition, no amputations noted        Labs Reviewed and Include   Recent Labs   Lab 08/12/19  1530   *   CALCIUM 7.3*      K 4.0   CO2 22*      BUN 16   CREATININE 0.8     Lab Results   Component Value Date    WBC 6.01 08/07/2019    HGB 9.5 (L) 08/07/2019    HCT 26 (L) 08/12/2019    MCV 85 08/07/2019     08/07/2019     No results for input(s): TSH, FREET4 in the last 168 hours.  Lab Results   Component Value Date    HGBA1C 7.5 (H) 08/07/2019       Nutritional status:   Body mass index is 41.88 kg/m².  Lab Results   Component Value Date    ALBUMIN 3.3 (L) 08/07/2019    ALBUMIN 3.3 (L) 08/01/2019    ALBUMIN 3.0 (L) 07/09/2019     No results found for: PREALBUMIN    Estimated Creatinine Clearance: 71.8 mL/min (based on SCr of 0.8  mg/dL).    Accu-Checks  Recent Labs     08/12/19  0610 08/12/19  1526   POCTGLUCOSE 149* 123*        ASSESSMENT and PLAN    * S/P CABG (coronary artery bypass graft)  S/p CABG on 8/12/19  Managed per primary team  Avoid hypoglycemia  Optimize BG control for surgical wound healing        Type 2 diabetes mellitus with stage 3 chronic kidney disease, without long-term current use of insulin  BG goal 140 - 180     Discontinue CTS protocol due to advanced age  Convert to standard intensive IV insulin infusion protocol  Requires hourly BG monitoring while on protocol     Discharge planning: TBD        CKD (chronic kidney disease) stage 3, GFR 30-59 ml/min  Titrate insulin slowly to avoid hypoglycemia as the risk of hypoglycemia increases with decreased creatinine clearance.  Caution with insulin stacking  Estimated Creatinine Clearance: 47.9 mL/min (based on SCr of 1.2 mg/dL).        Morbid obesity with body mass index (BMI) of 40.0 or higher  may contribute to insulin resistance  Body mass index is 41.88 kg/m².            Plan discussed with RN at bedside.     Mikey Pritchard NP  Endocrinology  [facility]

## 2019-08-12 NOTE — ANESTHESIA PROCEDURE NOTES
Arterial    Diagnosis: CAD    Patient location during procedure: done in OR  Procedure start time: 8/12/2019 7:17 AM  Timeout: 8/12/2019 7:17 AM  Procedure end time: 8/12/2019 7:22 AM    Staffing  Authorizing Provider: Leonard Edwards MD  Performing Provider: Imtiaz Pena MD    Anesthesiologist was present at the time of the procedure.    Preanesthetic Checklist  Completed: patient identified, site marked, surgical consent, pre-op evaluation, timeout performed, IV checked, risks and benefits discussed, monitors and equipment checked and anesthesia consent givenArterial  Skin Prep: chlorhexidine gluconate  Local Infiltration: lidocaine  Orientation: left  Location: radial  Catheter Size: 20 G  Catheter placement by Ultrasound guidance. Heme positive aspiration all ports.  Vessel Caliber: small, patent, compressibility normal  Needle advanced into vessel with real time Ultrasound guidance.  Sterile sheath used.Insertion Attempts: 2  Assessment  Dressing: secured with tape and tegaderm  Patient: Tolerated well

## 2019-08-12 NOTE — PLAN OF CARE
Problem: Adult Inpatient Plan of Care  Goal: Plan of Care Review  8/12: Admit to SICU s/p CABG x 3. Levo on. 500 albumin. Propofol d/c post-op. 1 amp bicarb.   Outcome: Ongoing (interventions implemented as appropriate)  VSS. Pt remains intubated with 40% FiO2 and 5 PEEP. Plan to try extubation over night shift per MD order. Gtts: levophed, insulin, D5 w/ 20 mEq. Propofol d/c post-op per MD order. Pt received 1 amp bicarbonate, 40 mEq K and 500 of albumin post-op per MD order. Ventricular wires connected to pacer and secured to dressing, back-up rate of 50. DSG is CDI and no s/s of bleeding. Chest tube output: 58 ml/shfit. UOP: 475ml/shift. Pt will wake up and follow commands with repeated stimulation, but then fall back asleep. Plan of care reviewed with the pt and family and all questions and concerns addressed.

## 2019-08-12 NOTE — SUBJECTIVE & OBJECTIVE
Interval HPI:   Overnight events: s/p CABG transferred from OR to SICU intubated.  BG well controlled on intensive insulin protocol, rates ranging from 2-6.6 u/hr.  On pressors and IV antibiotics.  Eating:   NPO  Nausea: No  Hypoglycemia and intervention: No  Fever: No  TPN and/or TF: No    PMH, PSH, FH, SH reviewed     ROS:  Unable to obtain due to: Sedation,Intubation,Altered mental status,Critical illness,Reviewed ROS from note dated 8/7/19 by Peterson Ventura MD.        Current Medications and/or Treatments Impacting Glycemic Control  Immunotherapy:    Immunosuppressants     None        Steroids:   Hormones (From admission, onward)    None        Pressors:    Autonomic Drugs (From admission, onward)    None        Hyperglycemia/Diabetes Medications:   Antihyperglycemics (From admission, onward)    Start     Stop Route Frequency Ordered    08/12/19 1500  insulin regular (Humulin R) 100 Units in sodium chloride 0.9% 100 mL infusion     Question:  Insulin rate changes (DO NOT MODIFY ANSWER)  Answer:  \\[employer]\epic\Images\Pharmacy\InsulinInfusions\CTS INSULIN [id number].pdf    -- IV Continuous 08/12/19 1459             PHYSICAL EXAMINATION:  Vitals:    08/12/19 0549   BP: (!) 141/65   Pulse: 88   Resp: 18   Temp: 97.7 °F (36.5 °C)     Body mass index is 41.88 kg/m².    Physical Exam     Constitutional: Well developed, obese, NAD.  ENT: External ears no masses with nose patent  Neck: Supple; trachea midline  Cardiovascular: Normal heart sounds, no LE edema. DP +2 bilaterally.  Lungs: Normal effort; lungs anterior bilaterally clear to auscultation.  Intubated on a ventilator.  Abdomen: Soft, no masses, no hernias.  Hypoactive BS noted.  MS: No clubbing or cyanosis of nails noted; unable to assess gait.  Skin: No rashes, lesions, or ulcers; no nodules.  Injection sites are ok. No lipo hypertropthy or atrophy.  Mid-sternal incision with telfa island dressing, CDI.  CT x 2.  LLE wrapped with ACE wrap.  Psychiatric:  YSABEL  Neurological: YSABEL  Foot: Nails in fair condition, no amputations noted

## 2019-08-12 NOTE — ANESTHESIA PROCEDURE NOTES
Central Line    Diagnosis: CAD  Patient location during procedure: done in OR  Procedure start time: 8/12/2019 7:45 AM  Timeout: 8/12/2019 7:40 AM  Procedure end time: 8/12/2019 8:05 AM    Staffing  Authorizing Provider: Leonard Edwards MD  Performing Provider: Imtiaz Pena MD    Staffing  Anesthesiologist: Leonard Edwards MD  Resident/CRNA: Imtiaz Pena MD  Performed: resident/CRNA   Anesthesiologist was present at the time of the procedure.  Preanesthetic Checklist  Completed: patient identified, site marked, surgical consent, pre-op evaluation, timeout performed, IV checked, risks and benefits discussed, monitors and equipment checked and anesthesia consent given  Indication   Indication: hemodynamic monitoring     Anesthesia   general anesthesia    Central Line   Skin Prep: skin prepped with ChloraPrep, skin prep agent completely dried prior to procedure  maximum sterile barriers used during central venous catheter insertion  hand hygiene performed prior to central venous catheter insertion  Location: right, internal jugular.   Catheter type: quad lumen  Catheter Size: 8.5 Fr  Inserted Catheter Length: 15 cm  Ultrasound: vascular probe with ultrasound  Vessel Caliber: medium, patent, compressibility normal  Needle advanced into vessel with real time Ultrasound guidance.   Manometry: Venous cannualation confirmed by visual estimation of blood vessel pressure using manometry.  Insertion Attempts: 1   Securement:line sutured, chlorhexidine patch, sterile dressing applied and blood return through all ports    Post-Procedure   Adverse Events:none    Guidewire Guidewire removed intact.

## 2019-08-12 NOTE — ANESTHESIA PROCEDURE NOTES
SILVA    Diagnosis: Coronary artery disease, angina presence unspecified, unspecified vessel or lesion type, unspecified whether native or transplanted heart (I25.10 (ICD-10-CM))  Patient location during procedure: OR  Procedure start time: 8/12/2019 8:30 AM  Timeout: 8/12/2019 8:30 AM  Procedure end time: 8/12/2019 9:00 AM  Surgery related to: CAD  Staffing  Anesthesiologist: Leonard Edwards MD  Performed: fellow and anesthesiologist   Preanesthetic Checklist  Completed: patient identified, surgical consent, pre-op evaluation, timeout performed, risks and benefits discussed, monitors and equipment checked, anesthesia consent given, oxygen available, suction available, hand hygiene performed and patient being monitored  Setup & Induction  Patient preparation: bite block inserted  Probe Insertion: easy  Exam: complete      Findings  Impression  Other Findings  PRE:  Normal biventricuar systolic function. Mild-to-moderate LVH noted.  MIKHAIL flows ~35 cm/sec. No thrombus observed.  Mild AI (VC=0.14), some NCC calcification noted w/ flow acceleration through the AV, Mean Gradient: 7  Trace MR, Trace PI. No TR noted.  Mild Aortic atheromatous disease      Post procedure:    Preserved biventricular function, no valvular changes.     No aortic dissection present following decannulation.  Probe removal atraumatic. No blood on probe.  Probe Removal      Exam     Left Heart  Left Atruim: dilated  Left appendage velocity:35.9    Left Ventricle: cm, normal (men 4.2-5.9; women 3.8-5.2)  LV Wall Thickness (posterior wall):1.44 cm, moderately abnormal (men 1.4-1.6 cm; women 1.3-1.5 cm)    LVAD:no  Estimated Ejection Fraction: > 55% normal  Regional Wall Abnormalities: no RWMA            Right Heart  Right Ventricle: normal  Right Ventricle Function: normal  Right Atria: cm and normal    Intra Atrial Septum  PFO: no shunt by color flow doppler  no IAS aneurysm  lipomatous hypertrophy  no Atrial Septal Defect (Asd)    Right  Ventricle  Size: normal, Free Wall Thickness: normal    Aortic Valve:  Stenosis: mild  Morphology: trileaflet and mild  LVOT: 1.8 cm  Annulus Diameter: 1.7 cm  STJ Diameter: 2.35 cm   LVOT TVI: 21.3 cm  AV VTI: 44.4 cm  AV Vmax: 193 cm/s2, aortic sclerosis (</=250)  AV Mean Gradient: 7 mmHg, mild (<20)  AV Peak Gradient: 15 mmHg  MOE by Continuity Equation: 1.22 cm2, moderate (1.0-1.5)  MOE by planimetry: 1.72 cm2  Regurgitation: mild (2+) aortic regurgitation     Mitral Valve:  Morphology:normal  Prolapse: none  Flail: no flail  Jet Description: traceStenosis: no significant stenosis.    Tricuspid Valve:  Morphology: normal  Regurgitation: none  CVP: 9 mmHg    Pulmonic Valve:  Morphology:normal  Regurgitation(color flow): mild    Great Vessels  Ascending Aorta Diameter: 2.6 cm   Ascending Aorta Atherosclerosis: 2=mild dz (<3mm)  Aortic Arch Atherosclerosis: 2=mild dz (<3mm)  IABP: no  Descending Aorta Atherosclerosis: 3=sessile dz (3-5mm)  Aorta    Descending aorta IABP: no    Effusions  Effusions: none    Summary  Findings discussed with surgeon.    Other Findings   PRE:  Normal biventricuar systolic function. Mild-to-moderate LVH noted.  MIKHAIL flows ~35 cm/sec. No thrombus observed.  Mild AI (VC=0.14), some NCC calcification noted w/ flow acceleration through the AV, Mean Gradient: 7  Trace MR, Trace PI. No TR noted.  Mild Aortic atheromatous disease      Post procedure:    Preserved biventricular function, no valvular changes.     No aortic dissection present following decannulation.  Probe removal atraumatic. No blood on probe.

## 2019-08-12 NOTE — INTERVAL H&P NOTE
The patient has been examined and the H&P has been reviewed:    I concur with the findings and no changes have occurred since H&P was written. She last took Brilinta on 8/3/19, Coreg this morning, and Glucophage yesterday. Patient is allergic to Keflex so will likely switch her christin-op abx from Ancef this morning. Will proceed with CABG this morning.    Anesthesia/Surgery risks, benefits and alternative options discussed and understood by patient/family.          Active Hospital Problems    Diagnosis  POA    CAD (coronary artery disease) [I25.10]  Yes      Resolved Hospital Problems   No resolved problems to display.

## 2019-08-12 NOTE — TRANSFER OF CARE
"Anesthesia Transfer of Care Note    Patient: Kaylee Romero    Procedure(s) Performed: Procedure(s) (LRB):  CORONARY ARTERY BYPASS GRAFT (CABG)X3 (N/A)  SURGICAL PROCUREMENT, VEIN, ENDOSCOPIC (Left)    Patient location: ICU    Anesthesia Type: general    Transport from OR: Transported from OR intubated on 100% O2 by AMBU with adequate controlled ventilation. Upon arrival to PACU/ICU, patient attached to ventilator and auscultated to confirm bilateral breath sounds and adequate TV. Continuous ECG monitoring in transport. Continuous SpO2 monitoring in transport. Continuos invasive BP monitoring in transport    Post pain: adequate analgesia    Post assessment: no apparent anesthetic complications    Post vital signs: stable    Level of consciousness: sedated    Nausea/Vomiting: no nausea/vomiting    Complications: none    Transfer of care protocol was followed      Last vitals:   Visit Vitals  BP (!) 141/65 (BP Location: Right arm, Patient Position: Lying)   Pulse 88   Temp 36.5 °C (97.7 °F) (Oral)   Resp 18   Ht 5' 2" (1.575 m)   Wt 103.9 kg (229 lb)   LMP 01/09/2001 (Approximate)   SpO2 100%   Breastfeeding? No   BMI 41.88 kg/m²     "

## 2019-08-12 NOTE — HPI
Reason for Consult: Management of T2DM, Hyperglycemia     Surgical Procedure and Date: CABG 8/12/19    Diabetes diagnosis year: 2018    Lab Results   Component Value Date    HGBA1C 7.5 (H) 08/07/2019       Home Diabetes Medications: Levemir 8 q HS, Glipizide 20 mg daily, Trulicity 1.5 mg SQ once weekly    How often checking glucose at home?  Once daily   BG readings on regimen: 100s  Hypoglycemia on the regimen?  no  Missed doses on regimen? no    Diabetes Complications include:     none  Complicating diabetes co morbidities:   History of MI, KAMRAN and CKD      HPI:   Patient is a 74 y.o. female with a diagnosis of DM2, KAMRAN, CKD, and CAD, who is s/p CABG on 8/12/19. Last seen in [facility] endocrinology clinic for DM by MAURICIO Pritchard NP on 5/23/19.  Positive family history of DM (father).  Endocrinology consulted for DM/BG management.

## 2019-08-13 LAB
ALLENS TEST: ABNORMAL
ANION GAP SERPL CALC-SCNC: 8 MMOL/L (ref 8–16)
APTT BLDCRRT: 23.1 SEC (ref 21–32)
BASOPHILS # BLD AUTO: 0.01 K/UL (ref 0–0.2)
BASOPHILS NFR BLD: 0.2 % (ref 0–1.9)
BUN SERPL-MCNC: 15 MG/DL (ref 8–23)
CALCIUM SERPL-MCNC: 7.7 MG/DL (ref 8.7–10.5)
CHLORIDE SERPL-SCNC: 107 MMOL/L (ref 95–110)
CO2 SERPL-SCNC: 23 MMOL/L (ref 23–29)
CREAT SERPL-MCNC: 0.9 MG/DL (ref 0.5–1.4)
DELSYS: ABNORMAL
DIFFERENTIAL METHOD: ABNORMAL
EOSINOPHIL # BLD AUTO: 0 K/UL (ref 0–0.5)
EOSINOPHIL NFR BLD: 0.2 % (ref 0–8)
ERYTHROCYTE [DISTWIDTH] IN BLOOD BY AUTOMATED COUNT: 14.7 % (ref 11.5–14.5)
EST. GFR  (AFRICAN AMERICAN): >60 ML/MIN/1.73 M^2
EST. GFR  (NON AFRICAN AMERICAN): >60 ML/MIN/1.73 M^2
FIO2: 36
FLOW: 4
GLUCOSE SERPL-MCNC: 145 MG/DL (ref 70–110)
GLUCOSE SERPL-MCNC: 160 MG/DL (ref 70–110)
GLUCOSE SERPL-MCNC: 162 MG/DL (ref 70–110)
GLUCOSE SERPL-MCNC: 164 MG/DL (ref 70–110)
GLUCOSE SERPL-MCNC: 173 MG/DL (ref 70–110)
HCO3 UR-SCNC: 20.6 MMOL/L (ref 24–28)
HCO3 UR-SCNC: 21.2 MMOL/L (ref 24–28)
HCO3 UR-SCNC: 21.2 MMOL/L (ref 24–28)
HCO3 UR-SCNC: 21.6 MMOL/L (ref 24–28)
HCO3 UR-SCNC: 22 MMOL/L (ref 24–28)
HCT VFR BLD AUTO: 27 % (ref 37–48.5)
HCT VFR BLD CALC: 22 %PCV (ref 36–54)
HCT VFR BLD CALC: 23 %PCV (ref 36–54)
HCT VFR BLD CALC: 24 %PCV (ref 36–54)
HCT VFR BLD CALC: 31 %PCV (ref 36–54)
HGB BLD-MCNC: 8.5 G/DL (ref 12–16)
IMM GRANULOCYTES # BLD AUTO: 0.03 K/UL (ref 0–0.04)
IMM GRANULOCYTES NFR BLD AUTO: 0.5 % (ref 0–0.5)
INR PPP: 1 (ref 0.8–1.2)
LDH SERPL L TO P-CCNC: 0.56 MMOL/L (ref 0.36–1.25)
LYMPHOCYTES # BLD AUTO: 0.5 K/UL (ref 1–4.8)
LYMPHOCYTES NFR BLD: 7.8 % (ref 18–48)
MAGNESIUM SERPL-MCNC: 1.7 MG/DL (ref 1.6–2.6)
MCH RBC QN AUTO: 27.6 PG (ref 27–31)
MCHC RBC AUTO-ENTMCNC: 31.5 G/DL (ref 32–36)
MCV RBC AUTO: 88 FL (ref 82–98)
MODE: ABNORMAL
MONOCYTES # BLD AUTO: 0.4 K/UL (ref 0.3–1)
MONOCYTES NFR BLD: 6.5 % (ref 4–15)
NEUTROPHILS # BLD AUTO: 5.1 K/UL (ref 1.8–7.7)
NEUTROPHILS NFR BLD: 84.8 % (ref 38–73)
NRBC BLD-RTO: 0 /100 WBC
PCO2 BLDA: 36 MMHG (ref 35–45)
PCO2 BLDA: 36.1 MMHG (ref 35–45)
PCO2 BLDA: 40.3 MMHG (ref 35–45)
PCO2 BLDA: 40.9 MMHG (ref 35–45)
PCO2 BLDA: 42.3 MMHG (ref 35–45)
PH SMN: 7.32 [PH] (ref 7.35–7.45)
PH SMN: 7.38 [PH] (ref 7.35–7.45)
PH SMN: 7.39 [PH] (ref 7.35–7.45)
PHOSPHATE SERPL-MCNC: 3.5 MG/DL (ref 2.7–4.5)
PLATELET # BLD AUTO: 122 K/UL (ref 150–350)
PMV BLD AUTO: 10 FL (ref 9.2–12.9)
PO2 BLDA: 177 MMHG (ref 80–100)
PO2 BLDA: 299 MMHG (ref 80–100)
PO2 BLDA: 300 MMHG (ref 80–100)
PO2 BLDA: 349 MMHG (ref 80–100)
PO2 BLDA: 60 MMHG (ref 80–100)
POC BE: -3 MMOL/L
POC BE: -4 MMOL/L
POC BE: -5 MMOL/L
POC BE: -5 MMOL/L
POC BE: -6 MMOL/L
POC IONIZED CALCIUM: 1.08 MMOL/L (ref 1.06–1.42)
POC IONIZED CALCIUM: 1.11 MMOL/L (ref 1.06–1.42)
POC IONIZED CALCIUM: 1.11 MMOL/L (ref 1.06–1.42)
POC IONIZED CALCIUM: 1.19 MMOL/L (ref 1.06–1.42)
POC SATURATED O2: 100 % (ref 95–100)
POC SATURATED O2: 90 % (ref 95–100)
POC TCO2: 22 MMOL/L (ref 23–27)
POC TCO2: 23 MMOL/L (ref 23–27)
POC TCO2: 23 MMOL/L (ref 23–27)
POCT GLUCOSE: 147 MG/DL (ref 70–110)
POCT GLUCOSE: 149 MG/DL (ref 70–110)
POCT GLUCOSE: 159 MG/DL (ref 70–110)
POCT GLUCOSE: 161 MG/DL (ref 70–110)
POCT GLUCOSE: 166 MG/DL (ref 70–110)
POCT GLUCOSE: 169 MG/DL (ref 70–110)
POCT GLUCOSE: 170 MG/DL (ref 70–110)
POCT GLUCOSE: 176 MG/DL (ref 70–110)
POCT GLUCOSE: 177 MG/DL (ref 70–110)
POCT GLUCOSE: 186 MG/DL (ref 70–110)
POCT GLUCOSE: 197 MG/DL (ref 70–110)
POCT GLUCOSE: 221 MG/DL (ref 70–110)
POCT GLUCOSE: 245 MG/DL (ref 70–110)
POTASSIUM BLD-SCNC: 3.2 MMOL/L (ref 3.5–5.1)
POTASSIUM BLD-SCNC: 3.2 MMOL/L (ref 3.5–5.1)
POTASSIUM BLD-SCNC: 3.3 MMOL/L (ref 3.5–5.1)
POTASSIUM BLD-SCNC: 3.3 MMOL/L (ref 3.5–5.1)
POTASSIUM SERPL-SCNC: 4 MMOL/L (ref 3.5–5.1)
POTASSIUM SERPL-SCNC: 4.2 MMOL/L (ref 3.5–5.1)
PROTHROMBIN TIME: 10.5 SEC (ref 9–12.5)
RBC # BLD AUTO: 3.08 M/UL (ref 4–5.4)
SAMPLE: ABNORMAL
SITE: ABNORMAL
SODIUM BLD-SCNC: 139 MMOL/L (ref 136–145)
SODIUM BLD-SCNC: 139 MMOL/L (ref 136–145)
SODIUM BLD-SCNC: 140 MMOL/L (ref 136–145)
SODIUM BLD-SCNC: 140 MMOL/L (ref 136–145)
SODIUM SERPL-SCNC: 138 MMOL/L (ref 136–145)
SP02: 93
WBC # BLD AUTO: 6.02 K/UL (ref 3.9–12.7)

## 2019-08-13 PROCEDURE — 80048 BASIC METABOLIC PNL TOTAL CA: CPT

## 2019-08-13 PROCEDURE — 99232 SBSQ HOSP IP/OBS MODERATE 35: CPT | Mod: ,,, | Performed by: NURSE PRACTITIONER

## 2019-08-13 PROCEDURE — 25000003 PHARM REV CODE 250: Performed by: STUDENT IN AN ORGANIZED HEALTH CARE EDUCATION/TRAINING PROGRAM

## 2019-08-13 PROCEDURE — 63600175 PHARM REV CODE 636 W HCPCS: Performed by: STUDENT IN AN ORGANIZED HEALTH CARE EDUCATION/TRAINING PROGRAM

## 2019-08-13 PROCEDURE — 84132 ASSAY OF SERUM POTASSIUM: CPT

## 2019-08-13 PROCEDURE — 85730 THROMBOPLASTIN TIME PARTIAL: CPT

## 2019-08-13 PROCEDURE — 94761 N-INVAS EAR/PLS OXIMETRY MLT: CPT

## 2019-08-13 PROCEDURE — 97165 OT EVAL LOW COMPLEX 30 MIN: CPT

## 2019-08-13 PROCEDURE — 27000221 HC OXYGEN, UP TO 24 HOURS

## 2019-08-13 PROCEDURE — 97161 PT EVAL LOW COMPLEX 20 MIN: CPT

## 2019-08-13 PROCEDURE — 84100 ASSAY OF PHOSPHORUS: CPT

## 2019-08-13 PROCEDURE — 25000003 PHARM REV CODE 250: Performed by: THORACIC SURGERY (CARDIOTHORACIC VASCULAR SURGERY)

## 2019-08-13 PROCEDURE — S0077 INJECTION, CLINDAMYCIN PHOSP: HCPCS | Performed by: STUDENT IN AN ORGANIZED HEALTH CARE EDUCATION/TRAINING PROGRAM

## 2019-08-13 PROCEDURE — 97530 THERAPEUTIC ACTIVITIES: CPT

## 2019-08-13 PROCEDURE — 82800 BLOOD PH: CPT

## 2019-08-13 PROCEDURE — 99232 PR SUBSEQUENT HOSPITAL CARE,LEVL II: ICD-10-PCS | Mod: ,,, | Performed by: NURSE PRACTITIONER

## 2019-08-13 PROCEDURE — 83605 ASSAY OF LACTIC ACID: CPT

## 2019-08-13 PROCEDURE — 85610 PROTHROMBIN TIME: CPT

## 2019-08-13 PROCEDURE — 82803 BLOOD GASES ANY COMBINATION: CPT

## 2019-08-13 PROCEDURE — 94640 AIRWAY INHALATION TREATMENT: CPT

## 2019-08-13 PROCEDURE — 83735 ASSAY OF MAGNESIUM: CPT

## 2019-08-13 PROCEDURE — 63600175 PHARM REV CODE 636 W HCPCS: Performed by: THORACIC SURGERY (CARDIOTHORACIC VASCULAR SURGERY)

## 2019-08-13 PROCEDURE — 25000242 PHARM REV CODE 250 ALT 637 W/ HCPCS: Performed by: THORACIC SURGERY (CARDIOTHORACIC VASCULAR SURGERY)

## 2019-08-13 PROCEDURE — 99900035 HC TECH TIME PER 15 MIN (STAT)

## 2019-08-13 PROCEDURE — 37799 UNLISTED PX VASCULAR SURGERY: CPT

## 2019-08-13 PROCEDURE — 63600175 PHARM REV CODE 636 W HCPCS: Performed by: NURSE PRACTITIONER

## 2019-08-13 PROCEDURE — 20600001 HC STEP DOWN PRIVATE ROOM

## 2019-08-13 PROCEDURE — 85025 COMPLETE CBC W/AUTO DIFF WBC: CPT

## 2019-08-13 RX ORDER — METOPROLOL TARTRATE 25 MG/1
12.5 TABLET ORAL 2 TIMES DAILY
Status: DISCONTINUED | OUTPATIENT
Start: 2019-08-13 | End: 2019-08-14

## 2019-08-13 RX ORDER — INSULIN ASPART 100 [IU]/ML
0-4 INJECTION, SOLUTION INTRAVENOUS; SUBCUTANEOUS
Status: DISCONTINUED | OUTPATIENT
Start: 2019-08-13 | End: 2019-08-16

## 2019-08-13 RX ORDER — HYDRALAZINE HYDROCHLORIDE 20 MG/ML
10 INJECTION INTRAMUSCULAR; INTRAVENOUS EVERY 6 HOURS PRN
Status: DISCONTINUED | OUTPATIENT
Start: 2019-08-13 | End: 2019-08-21 | Stop reason: HOSPADM

## 2019-08-13 RX ORDER — CLINDAMYCIN PHOSPHATE 900 MG/50ML
900 INJECTION, SOLUTION INTRAVENOUS
Status: COMPLETED | OUTPATIENT
Start: 2019-08-13 | End: 2019-08-13

## 2019-08-13 RX ORDER — METOPROLOL TARTRATE 25 MG/1
25 TABLET, FILM COATED ORAL 2 TIMES DAILY
Status: DISCONTINUED | OUTPATIENT
Start: 2019-08-13 | End: 2019-08-13

## 2019-08-13 RX ORDER — ACETAMINOPHEN 500 MG
1000 TABLET ORAL EVERY 6 HOURS PRN
Status: DISCONTINUED | OUTPATIENT
Start: 2019-08-13 | End: 2019-08-21 | Stop reason: HOSPADM

## 2019-08-13 RX ORDER — HEPARIN SODIUM 5000 [USP'U]/ML
5000 INJECTION, SOLUTION INTRAVENOUS; SUBCUTANEOUS EVERY 8 HOURS
Status: DISCONTINUED | OUTPATIENT
Start: 2019-08-13 | End: 2019-08-21 | Stop reason: HOSPADM

## 2019-08-13 RX ORDER — GLUCAGON 1 MG
1 KIT INJECTION
Status: DISCONTINUED | OUTPATIENT
Start: 2019-08-13 | End: 2019-08-16

## 2019-08-13 RX ORDER — IBUPROFEN 200 MG
24 TABLET ORAL
Status: DISCONTINUED | OUTPATIENT
Start: 2019-08-13 | End: 2019-08-16

## 2019-08-13 RX ORDER — IBUPROFEN 200 MG
16 TABLET ORAL
Status: DISCONTINUED | OUTPATIENT
Start: 2019-08-13 | End: 2019-08-16

## 2019-08-13 RX ADMIN — IPRATROPIUM BROMIDE AND ALBUTEROL SULFATE 3 ML: .5; 3 SOLUTION RESPIRATORY (INHALATION) at 03:08

## 2019-08-13 RX ADMIN — DOCUSATE SODIUM 100 MG: 100 CAPSULE, LIQUID FILLED ORAL at 08:08

## 2019-08-13 RX ADMIN — MUPIROCIN 1 G: 20 OINTMENT TOPICAL at 09:08

## 2019-08-13 RX ADMIN — POLYETHYLENE GLYCOL 3350 17 G: 17 POWDER, FOR SOLUTION ORAL at 08:08

## 2019-08-13 RX ADMIN — ONDANSETRON 4 MG: 2 INJECTION INTRAMUSCULAR; INTRAVENOUS at 07:08

## 2019-08-13 RX ADMIN — POTASSIUM CHLORIDE 20 MEQ: 200 INJECTION, SOLUTION INTRAVENOUS at 10:08

## 2019-08-13 RX ADMIN — MUPIROCIN 1 G: 20 OINTMENT TOPICAL at 08:08

## 2019-08-13 RX ADMIN — POTASSIUM CHLORIDE 20 MEQ: 1500 TABLET, EXTENDED RELEASE ORAL at 08:08

## 2019-08-13 RX ADMIN — ASPIRIN 325 MG: 325 TABLET, DELAYED RELEASE ORAL at 08:08

## 2019-08-13 RX ADMIN — INSULIN ASPART 1 UNITS: 100 INJECTION, SOLUTION INTRAVENOUS; SUBCUTANEOUS at 05:08

## 2019-08-13 RX ADMIN — ACETAMINOPHEN 1000 MG: 500 TABLET ORAL at 03:08

## 2019-08-13 RX ADMIN — CLINDAMYCIN IN 5 PERCENT DEXTROSE 900 MG: 18 INJECTION, SOLUTION INTRAVENOUS at 09:08

## 2019-08-13 RX ADMIN — IPRATROPIUM BROMIDE AND ALBUTEROL SULFATE 3 ML: .5; 3 SOLUTION RESPIRATORY (INHALATION) at 07:08

## 2019-08-13 RX ADMIN — HEPARIN SODIUM 5000 UNITS: 5000 INJECTION, SOLUTION INTRAVENOUS; SUBCUTANEOUS at 01:08

## 2019-08-13 RX ADMIN — MAGNESIUM SULFATE IN WATER 2 G: 40 INJECTION, SOLUTION INTRAVENOUS at 05:08

## 2019-08-13 RX ADMIN — METOPROLOL TARTRATE 12.5 MG: 25 TABLET, FILM COATED ORAL at 09:08

## 2019-08-13 RX ADMIN — METOPROLOL TARTRATE 12.5 MG: 25 TABLET, FILM COATED ORAL at 08:08

## 2019-08-13 RX ADMIN — IPRATROPIUM BROMIDE AND ALBUTEROL SULFATE 3 ML: .5; 3 SOLUTION RESPIRATORY (INHALATION) at 11:08

## 2019-08-13 RX ADMIN — NICARDIPINE HYDROCHLORIDE 5 MG/HR: 0.2 INJECTION, SOLUTION INTRAVENOUS at 03:08

## 2019-08-13 RX ADMIN — IPRATROPIUM BROMIDE AND ALBUTEROL SULFATE 3 ML: .5; 3 SOLUTION RESPIRATORY (INHALATION) at 04:08

## 2019-08-13 RX ADMIN — HEPARIN SODIUM 5000 UNITS: 5000 INJECTION, SOLUTION INTRAVENOUS; SUBCUTANEOUS at 09:08

## 2019-08-13 RX ADMIN — IPRATROPIUM BROMIDE AND ALBUTEROL SULFATE 3 ML: .5; 3 SOLUTION RESPIRATORY (INHALATION) at 12:08

## 2019-08-13 RX ADMIN — ATORVASTATIN CALCIUM 40 MG: 20 TABLET, FILM COATED ORAL at 08:08

## 2019-08-13 NOTE — PLAN OF CARE
Problem: Physical Therapy Goal  Goal: Physical Therapy Goal  Goals to be met by: 2019    Patient will increase functional independence with mobility by performin. Supine <> sit with Contact Guard Assistance.  2. Sit <> stand transfer with Contact Guard Assistance using No Assistive Device.  3. Bed <> chair transfer via Stand Pivot with Contact Guard Assistance using No Assistive Device.  4. Gait  x 150 feet with Contact Guard Assistance using No Assistive Device to prepare for community ambulation and endurance activities.  5. Dynamic standing for 8 minutes with Contact Guard Assistance using Rolling Walker to prepare for functional tasks in standing.  6. Able to tolerate exercise for 15-20 reps with independence.  7. Patient is able to recall 3/3 sternal precautions without assistance.       Outcome: Ongoing (interventions implemented as appropriate)    Discharge Recommendation: HHPT.  Please continue Progressive Mobility Protocol as appropriate.  Appropriate transfer level with nursing staff: Bed <> Chair:  Stand Pivot with minimum assistance with No Assistive Device.    Roxana Bro PT, DPT  2019  Pager: [phone]
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
## 2019-08-13 NOTE — PT/OT/SLP EVAL
Occupational Therapy   Evaluation    Name: Kaylee Romero  MRN: [id number]  Admitting Diagnosis:  S/P CABG (coronary artery bypass graft) 1 Day Post-Op    Recommendations:     Discharge Recommendations: home health OT  Discharge Equipment Recommendations:  none  Barriers to discharge:  Inaccessible home environment    Assessment:     Kaylee Romero is a 74 y.o. female with a medical diagnosis of S/P CABG (coronary artery bypass graft).  She was able to perform supine/sit, sit/stand, and bed/chair T/F c min A.  Pt was very lethargic throughout assessment.  Also c c/o nausea and was noted to have received Zofran prior to OT arrival.  Able to perform UB/LB dressing c total assist.  B UE are WFL c 3/5 strength. Performance deficits affecting function: impaired endurance, weakness, impaired self care skills, impaired functional mobilty, impaired balance, decreased upper extremity function.      Rehab Prognosis: Good; patient would benefit from acute skilled OT services to address these deficits and reach maximum level of function.       Plan:     Patient to be seen 4 x/week to address the above listed problems via self-care/home management, therapeutic activities, therapeutic exercises  · Plan of Care Expires: 08/27/19  · Plan of Care Reviewed with: patient, spouse    Subjective     Chief Complaint: Pt is s/p CABG x3 c sternal precautions  Patient/Family Comments/goals: To get better.    Occupational Profile:  Living Environment: Pt lives in a one story house c 4 PAUL and has B rails.    Previous level of function: I PTA  Equipment Used at Home:  walker, rolling, cane, straight, CPAP, shower chair  Assistance upon Discharge:     Pain/Comfort:  · Pain Rating 1: 4/10(Chest)    Patients cultural, spiritual, Episcopal conflicts given the current situation: yes    Objective:     Communicated with: RN prior to session.  Patient found supine with arterial line, blood pressure cuff, chest tube, owens catheter,  peripheral IV, pulse ox (continuous), telemetry upon OT entry to room.    General Precautions: Standard, fall, sternal   Orthopedic Precautions:N/A   Braces: N/A     Occupational Performance:    Bed Mobility:    · Patient completed Supine to Sit with minimum assistance    Functional Mobility/Transfers:  · Patient completed Sit <> Stand Transfer with minimum assistance  with  hand-held assist   · Patient completed Bed <> Chair Transfer using Stand Pivot technique with minimum assistance with hand-held assist      Activities of Daily Living:  · Upper Body Dressing: total assistance to don hospital gown.  · Lower Body Dressing: total assistance to don socks.    Cognitive/Visual Perceptual:  Cognitive/Psychosocial Skills:     -       Oriented to: Person, Place, Time and Situation   -       Follows Commands/attention:Follows multistep  commands    Physical Exam:  Upper Extremity Range of Motion:     -       Right Upper Extremity: WFL  -       Left Upper Extremity: WFL  Upper Extremity Strength:    -       Right Upper Extremity: WFL  -       Left Upper Extremity: WFL    AMPAC 6 Click ADL:  AMPAC Total Score: 14    Education:    Patient left up in chair with all lines intact, call button in reach, RN notified and  present    GOALS:   Multidisciplinary Problems     Occupational Therapy Goals        Problem: Occupational Therapy Goal    Goal Priority Disciplines Outcome Interventions   Occupational Therapy Goal     OT, PT/OT     Description:  Goals to be met by: 8/27/19     Patient will increase functional independence with ADLs by performing:    UE Dressing with Wells.  LE Dressing with Wells.  Grooming while standing at sink with Wells.  Toileting from toilet with Wells for hygiene and clothing management.   Bathing from shower chair with Wells.  Toilet transfer to toilet with Wells.  Increased functional strength to WFL for B UE.  Upper extremity exercise program x15 reps  per handout, with independence.                      History:     Past Medical History:   Diagnosis Date    Acid reflux     Age-related osteoporosis without current pathological fracture 2019    Cataract     CKD (chronic kidney disease) stage 3, GFR 30-59 ml/min     Dry mouth     History of TIA (transient ischemic attack) 2018    Hyperlipidemia 2014    Hypertensive heart disease with diastolic heart failure 2012    Morbid obesity with body mass index (BMI) of 40.0 or higher 2016    KAMRAN (obstructive sleep apnea)     KAMRAN on CPAP 2016    Osteoporosis without current pathological fracture 2018    Physical deconditioning 2018    Status post total left knee replacement 2017    Type 2 diabetes mellitus with stage 3 chronic kidney disease, without long-term current use of insulin 2019       Past Surgical History:   Procedure Laterality Date    ankle surgery (l)      APPENDECTOMY       SECTION      CORONARY ARTERY BYPASS GRAFT (CABG)X3 N/A 2019    [facility]    DILATION AND CURETTAGE OF UTERUS      KNEE ARTHROSCOPY W/ DEBRIDEMENT      KNEE SURGERY Left 2017    TKR    Left heart cath Left 2019    Performed by Ronak Gibbons MD at [facility] CATH LAB    REPLACEMENT-KNEE-TOTAL Left 2017    Performed by [facility] Jr., [facility] at [facility]    SURGICAL PROCUREMENT, VEIN, ENDOSCOPIC Left 2019    Per[facility]       Time Tracking:     OT Date of Treatment: 19  OT Start Time: 0835  OT Stop Time: 09  OT Total Time (min): 30 min    Billable Minutes:Evaluation 15  Therapeutic Activity INGRID Guillory  2019

## 2019-08-13 NOTE — HOSPITAL COURSE
Patient underwent CABG x2 on 8/12 with Dr. Ventura. Preoperatively she was given a parasternal block as well as a dose of methadone for postoperative pain contro. Intraoperatively, the case proceeded without issue. She received 2u RBC, 2L crystalloid, and 660cc cell saver. There was a 40 min clamp time. TTE at the conclusion of the case showed her to have an EF of 55% and mild aortic insufficiency. Upon arrival to the SICU she was intubated and was quickly weaned to minimal vent settings. She initially presented on Levo 0.08, TXA gtt, and insulin gtt. She was extubated later in the evening when the effects of anesthesia lessened.

## 2019-08-13 NOTE — CARE UPDATE
Placed pt. On a SBT per harmony's order pt. Is tolerating it well, no resp. distress noted at this time, f/Vt=34,  will continue to monitor.

## 2019-08-13 NOTE — PLAN OF CARE
08/13/19 1659   Discharge Assessment   Assessment Type Discharge Planning Assessment   Confirmed/corrected address and phone number on facesheet? Yes   Assessment information obtained from? Medical Record   Expected Length of Stay (days) 5   Communicated expected length of stay with patient/caregiver yes   Prior to hospitilization cognitive status: Alert/Oriented   Prior to hospitalization functional status: Independent   Current cognitive status: Alert/Oriented   Current Functional Status: Needs Assistance   Facility Arrived From: home for planned surgery   Lives With spouse;child(rebeca), adult   Able to Return to Prior Arrangements yes   Is patient able to care for self after discharge? No  (Pt will need assistance with driving, sternal precautions. )   Who are your caregiver(s) and their phone number(s)? spouse Teddy Romero [phone]   Patient's perception of discharge disposition home or selfcare   Readmission Within the Last 30 Days planned readmission   Patient currently being followed by outpatient case management? No   Patient currently receives any other outside agency services? No   Equipment Currently Used at Home walker, rolling;cane, straight;CPAP   Do you have any problems affording any of your prescribed medications? No   Is the patient taking medications as prescribed? yes   Does the patient have transportation home? Yes   Transportation Anticipated family or friend will provide   Does the patient receive services at the [facility]? No   Discharge Plan A Home with family;Home Health   Discharge Plan B Home with family   DME Needed Upon Discharge  none   Patient/Family in Agreement with Plan yes     Pt is POD 1 CABG. The patient lives with her spouse, son and DIL in [place], [place]. She was independent with ADL's prior to admit however uses equipment for ambulation. The rolling walker she has will not be able to be used with sternal precautions, Will discuss with PT to determine balance  and stability without a rolling walker or if the patient will need platform attachments. PT/OT recs home w/ HH. The patient has [facility] and a referral will need to be sent dirctly to [facility] to set up services. Transfer orders in chart pending bed. Will follow up with patient and caregiver once stepped down from ICU tomorrow.       [facility] #[id number] - [address] AT [facility] OF [address] [address]  Phone: [phone] Fax: [phone]    PCP  Liz Roberts MD     Payor: [employer] MANAGED MEDICARE / Plan: [employer] CHOICES 65 / Product Type: Medicare Advantage /
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
## 2019-08-13 NOTE — ANESTHESIA POSTPROCEDURE EVALUATION
Anesthesia Post Evaluation    Patient: Kaylee Romero    Procedure(s) Performed: Procedure(s) (LRB):  CORONARY ARTERY BYPASS GRAFT (CABG)X3 (N/A)  SURGICAL PROCUREMENT, VEIN, ENDOSCOPIC (Left)    Final Anesthesia Type: general  Patient location during evaluation: ICU  Patient participation: Yes- Able to Participate  Level of consciousness: awake and alert  Post-procedure vital signs: reviewed and stable  Pain management: adequate  Airway patency: patent  PONV status at discharge: No PONV  Anesthetic complications: no      Cardiovascular status: blood pressure returned to baseline  Respiratory status: unassisted and nasal cannula  Hydration status: euvolemic  Follow-up not needed.          Vitals Value Taken Time   /67 8/13/2019 12:01 PM   Temp 36.9 °C (98.5 °F) 8/13/2019 11:00 AM   Pulse 94 8/13/2019 12:39 PM   Resp 18 8/13/2019 11:06 AM   SpO2 90 % 8/13/2019 12:39 PM   Vitals shown include unvalidated device data.      No case tracking events are documented in the log.      Pain/Edwin Score: Pain Rating Prior to Med Admin: 1 (8/13/2019 11:01 AM)  Pain Rating Post Med Admin: 0 (8/13/2019  8:00 AM)

## 2019-08-13 NOTE — SUBJECTIVE & OBJECTIVE
Follow-up For: Procedure(s) (LRB):  CORONARY ARTERY BYPASS GRAFT (CABG)X3 (N/A)  SURGICAL PROCUREMENT, VEIN, ENDOSCOPIC (Left)    Post-Operative Day: 1 Day Post-Op     Past Medical History:   Diagnosis Date    Acid reflux     Age-related osteoporosis without current pathological fracture 2019    Cataract     CKD (chronic kidney disease) stage 3, GFR 30-59 ml/min     Dry mouth     History of TIA (transient ischemic attack) 2018    Hyperlipidemia 2014    Hypertensive heart disease with diastolic heart failure 2012    Morbid obesity with body mass index (BMI) of 40.0 or higher 2016    KAMRAN (obstructive sleep apnea)     KAMRAN on CPAP 2016    Osteoporosis without current pathological fracture 2018    Physical deconditioning 2018    Status post total left knee replacement 2017    Type 2 diabetes mellitus with stage 3 chronic kidney disease, without long-term current use of insulin 2019       Past Surgical History:   Procedure Laterality Date    ankle surgery (l)      APPENDECTOMY       SECTION      DILATION AND CURETTAGE OF UTERUS      KNEE ARTHROSCOPY W/ DEBRIDEMENT      KNEE SURGERY Left 2017    TKR    Left heart cath Left 2019    Performed by Ronak Gibbons MD at [facility] CATH LAB    REPLACEMENT-KNEE-TOTAL Left 2017    Performed by [facility] MD at [facility] OR [address] [place] [address]       Review of patient's allergies indicates:   Allergen Reactions    Bactrim [sulfamethoxazole-trimethoprim] Other (See Comments)     Generic version  Sulfa makes her sick    Keflex [cephalexin] Other (See Comments)     Turns orange       Family History     Problem Relation (Age of Onset)    Diabetes Father    Heart disease Mother, Father    Heart failure Mother, Father    No Known Problems Brother, Son    Stroke Father, Paternal Grandfather        Tobacco Use    Smoking status: Never Smoker    Smokeless tobacco: Never Used   Substance and  Sexual Activity    Alcohol use: Yes     Alcohol/week: 0.0 oz     Comment: social drinker    Drug use: No    Sexual activity: Yes     Partners: Male     Birth control/protection: Post-menopausal        Objective:     Vital Signs (Most Recent):  Temp: 98.1 °F (36.7 °C) (08/12/19 2300)  Pulse: 91 (08/13/19 0033)  Resp: 17 (08/13/19 0033)  BP: (!) 87/61 (08/13/19 0000)  SpO2: 98 % (08/13/19 0033) Vital Signs (24h Range):  Temp:  [97.5 °F (36.4 °C)-98.1 °F (36.7 °C)] 98.1 °F (36.7 °C)  Pulse:  [84-99] 91  Resp:  [17-27] 17  SpO2:  [93 %-100 %] 98 %  BP: ()/(55-77) 87/61  Arterial Line BP: ()/(51-79) 108/54     Weight: 103.9 kg (229 lb)  Body mass index is 41.88 kg/m².      Intake/Output Summary (Last 24 hours) at 8/13/2019 0237  Last data filed at 8/13/2019 0100  Gross per 24 hour   Intake 3411.45 ml   Output 1716 ml   Net 1695.45 ml       Physical Exam   Constitutional: She is oriented to person, place, and time. She appears well-developed.   Neck: No JVD present. No tracheal deviation present.   RIJ Quad lumen CVC   Cardiovascular: Normal rate and regular rhythm.   Y MS CT and Y Pleural CT to wall suction with SS output  Sternal dressing clean, dry intact   Pulmonary/Chest: Breath sounds normal. No respiratory distress.   Extubated on 4L NC   Abdominal: Soft. She exhibits no distension and no mass. There is no tenderness. There is no rebound and no guarding.   Musculoskeletal: She exhibits no edema.   L radial A-line   Neurological: She is alert and oriented to person, place, and time.   Skin: Skin is warm and dry.       Vents:  Vent Mode: Spont (08/12/19 2100)  Set Rate: 16 bmp (08/12/19 2055)  Vt Set: 400 mL (08/12/19 2055)  Pressure Support: 7 cmH20 (08/12/19 2100)  PEEP/CPAP: 5 cmH20 (08/12/19 2100)  Oxygen Concentration (%): 36 (08/13/19 0033)  Peak Airway Pressure: 12 cmH2O (08/12/19 2100)  Plateau Pressure: 0 cmH20 (08/12/19 2100)  Total Ve: 5.56 mL (08/12/19 2100)  Negative Inspiratory Force (cm  H2O): -37 (08/12/19 2055)    Lines/Drains/Airways     Central Venous Catheter Line                 Percutaneous Central Line Insertion/Assessment - Quad lumen  08/12/19 0745 less than 1 day         Percutaneous Central Line Insertion/Assessment - quad lumen  08/12/19 0745 less than 1 day          Drain                 Chest Tube 08/12/19 1303 3 Left Pleural 19 Fr. less than 1 day         Urethral Catheter 08/12/19 0736 Straight-tip;Temperature probe;Non-latex less than 1 day         Y Chest Tube 1 and 2 08/12/19 1302 Right Mediastinal 19 Fr. Left Mediastinal 19 Fr. less than 1 day          Arterial Line                 Arterial Line 08/12/19 0717 Left Radial less than 1 day          Line                 Pacer Wires 08/12/19 1257 less than 1 day          Peripheral Intravenous Line                 Peripheral IV - Single Lumen 07/10/19 1324 22 G Anterior;Left Forearm 33 days         Peripheral IV - Single Lumen 08/12/19 0600 18 G Right Forearm less than 1 day                Significant Labs:    CBC/Anemia Profile:  Recent Labs   Lab 08/12/19  1529 08/12/19  1530 08/12/19  1731   WBC  --  11.00  --    HGB  --  9.2*  --    HCT 26* 28.5* 25*   PLT  --  160  --    MCV  --  87  --    RDW  --  14.6*  --         Chemistries:  Recent Labs   Lab 08/12/19  1530 08/12/19  2019     --    K 4.0 4.4     --    CO2 22*  --    BUN 16  --    CREATININE 0.8  --    CALCIUM 7.3*  --    MG 2.1  2.1  --    PHOS 3.6  3.6  --        Coagulation:   Recent Labs   Lab 08/12/19  1530   INR 1.2   APTT 32.3*       Significant Imaging: I have reviewed all pertinent imaging results/findings within the past 24 hours.

## 2019-08-13 NOTE — SUBJECTIVE & OBJECTIVE
"Interval HPI:   Overnight events: Remains in ICU. Extubated. BG at goal on IV insulin infusion rates 0.5- 1.5 u/hr.    Eating:   NPO; diet advancing   Nausea: No  Hypoglycemia and intervention: No  Fever: No  TPN and/or TF: No    /60 (BP Location: Right arm, Patient Position: Lying)   Pulse 89   Temp 98.4 °F (36.9 °C) (Oral)   Resp 18   Ht 5' 2" (1.575 m)   Wt 112.9 kg (249 lb)   LMP 01/09/2001 (Approximate)   SpO2 (!) 92%   Breastfeeding? No   BMI 45.54 kg/m²     Labs Reviewed and Include    Recent Labs   Lab 08/13/19  0259   *   CALCIUM 7.7*      K 4.2   CO2 23      BUN 15   CREATININE 0.9     Lab Results   Component Value Date    WBC 6.02 08/13/2019    HGB 8.5 (L) 08/13/2019    HCT 27.0 (L) 08/13/2019    MCV 88 08/13/2019     (L) 08/13/2019     No results for input(s): TSH, FREET4 in the last 168 hours.  Lab Results   Component Value Date    HGBA1C 7.5 (H) 08/07/2019       Nutritional status:   Body mass index is 45.54 kg/m².  Lab Results   Component Value Date    ALBUMIN 3.3 (L) 08/07/2019    ALBUMIN 3.3 (L) 08/01/2019    ALBUMIN 3.0 (L) 07/09/2019     No results found for: PREALBUMIN    Estimated Creatinine Clearance: 67.1 mL/min (based on SCr of 0.9 mg/dL).    Accu-Checks  Recent Labs     08/12/19  2205 08/12/19  2304 08/13/19  0015 08/13/19  0103 08/13/19  0202 08/13/19  0305 08/13/19  0404 08/13/19  0511 08/13/19  0607 08/13/19  0719   POCTGLUCOSE 161* 168* 166* 186* 149* 177* 147* 161* 159* 169*       Current Medications and/or Treatments Impacting Glycemic Control  Immunotherapy:    Immunosuppressants     None        Steroids:   Hormones (From admission, onward)    None        Pressors:    Autonomic Drugs (From admission, onward)    None        Hyperglycemia/Diabetes Medications:   Antihyperglycemics (From admission, onward)    Start     Stop Route Frequency Ordered    08/12/19 5005  insulin regular (Humulin R) 100 Units in sodium chloride 0.9% 100 mL infusion   "   Question:  Insulin Rate Adjustment (DO NOT MODIFY ANSWER)  Answer:  [email or web address]    -- IV Continuous 08/12/19 1600

## 2019-08-13 NOTE — OP NOTE
DATE OF PROCEDURE:  08/12/2019    PREOPERATIVE DIAGNOSES:  1.  Coronary artery disease.  2.  Unstable angina.  3.  Non-ST elevation MI, less than 4 weeks.  4.  Insulin-dependent diabetes.  5.  BMI greater than 41.  6.  Anemia with a hematocrit of 30.    SURGEON:  Peterson Ventura M.D.    ASSISTANT: Rom Cespedes, PGY-6.    ANESTHESIA:  General anesthesia.    PROCEDURE PERFORMED:  Coronary artery bypass grafting x2, left internal mammary   artery to left anterior descending artery, saphenous vein graft to obtuse   marginal.    INDICATIONS FOR PROCEDURE:  This is a 74-year-old woman who was transferred   after a non-STEMI.  I saw her in the hospital.  She followed up in the clinic.    She was having unstable angina, so we moved her surgery up.  She had anemia of   unknown origin.  No GI bleeding per history.  She also had insulin-dependent   diabetes.  I discussed the risks and benefits of the procedure.  She was able to   give informed consent.    OPERATIVE FINDINGS:  The anterior descending artery was less than 2 mm,   moderately diseased.  The mammary artery was of excellent quality.  It was 2 mm.    The vein was of good quality.  The marginal artery was severely diseased and   had a diameter of approximately 2 mm.  The right coronary artery and posterior   descending artery were not targets for revascularization.  They were not   bypassed.  Ejection fraction was preserved.    BLOOD LOSS:  100 mL.    PROCEDURE IN DETAIL:  The patient was taken as an outpatient to the Operating   Room and placed supine upon the table.  General anesthesia was administered.    Monitoring lines were placed.  Transesophageal echocardiography was performed.    The patient was prepped and draped sterilely.    The first assist harvested the vein endoscopically as no qualified resident was   available.  It was removed and prepared on the back table.  It required 3 open   small incisions due to branches and anatomy.    A median sternotomy  was performed through a midline incision.  The left   hemisternum was elevated.  The internal thoracic artery was carefully   skeletonized.  The patient was heparinized.  The mammary artery was divided and   spatulated.  It had excellent flow.  A pericardial well was created.  A small   notch in the pericardium was made on the left side, staying well away from the   phrenic nerve.  We cannulated the ascending aorta and the right atrium and   antegrade cardioplegia catheter and vent was placed in the ascending aorta.    After an ACT greater than 480 seconds was achieved, the patient was placed on   cardiopulmonary bypass.  A crossclamp was applied.  Cardioplegia was delivered   antegrade.  The heart exhibited a prompt arrest and was quiescent for the   procedure.  We delivered cardioplegia every 20 minutes.    The right-sided coronary anatomy was not amenable to bypassing due to severe   disease and small size of the PDA.  The marginal artery was identified.  It was   opened sharply and extended with Arevalo scissors.  Distal anastomosis was   performed with a 7-0 suture.  We brought the vein up to the ascending aorta   without twisting.  We divided and spatulated the vein, opened the aorta sharply,   extended it with a 4 mm punch and performed a proximal anastomosis with a 6-0   suture.    The LAD was targeted.  It was identified in its distal third running to the   apex.  The artery was opened sharply and extended with Arevalo scissors.  It was   really a severely diseased vessel with soft plaque along its entire wall.  We   chose the best spot for the anastomosis.  The artery was opened sharply and   arteriotomy was extended with Arevalo scissors.  Distal anastomosis with the KENDY   graft was performed with a 7-0 suture.    The crossclamp was removed.  The heart was reperfused.  After a brief period of   reperfusion, we  from cardiopulmonary bypass with excellent   hemodynamics.  Protamine was administered.   Cannulas were removed.  Excellent   hemostasis was achieved.  Drains and pacing wires were placed.  The sternum was   reapproximated with interrupted stainless steel wires.  The skin and fascia were   closed in layers.  The leg incision was closed in layers.  The patient was   taken back to the ICU intubated and in stable condition.    CROSSCLAMP TIME:  44 minutes.      SHAHRIAR/IN  dd: 08/13/2019 14:09:44 (CDT)  td: 08/13/2019 17:59:25 (CDT)  Doc ID   #[id number]  Job ID #[id number]    CC:

## 2019-08-13 NOTE — SUBJECTIVE & OBJECTIVE
Interval History/Significant Events: No acute events overnight.  Extubated yesterday evening, satting well on 4L NC. Sleepy this morning, but vital signs stable.     Follow-up For: Procedure(s) (LRB):  CORONARY ARTERY BYPASS GRAFT (CABG)X3 (N/A)  SURGICAL PROCUREMENT, VEIN, ENDOSCOPIC (Left)    Post-Operative Day: 1 Day Post-Op    Objective:     Vital Signs (Most Recent):  Temp: 98.4 °F (36.9 °C) (08/13/19 0700)  Pulse: 89 (08/13/19 0815)  Resp: 18 (08/13/19 0703)  BP: 109/60 (08/13/19 0800)  SpO2: (!) 92 % (08/13/19 0815) Vital Signs (24h Range):  Temp:  [97.5 °F (36.4 °C)-98.4 °F (36.9 °C)] 98.4 °F (36.9 °C)  Pulse:  [84-99] 89  Resp:  [17-27] 18  SpO2:  [92 %-100 %] 92 %  BP: ()/(55-77) 109/60  Arterial Line BP: ()/(50-79) 108/54     Weight: 112.9 kg (249 lb)  Body mass index is 45.54 kg/m².      Intake/Output Summary (Last 24 hours) at 8/13/2019 0913  Last data filed at 8/13/2019 0800  Gross per 24 hour   Intake 2636.5 ml   Output 2198 ml   Net 438.5 ml       Physical Exam   Constitutional: She is oriented to person, place, and time. She appears well-developed.   Neck: No JVD present. No tracheal deviation present.   RIJ Quad lumen CVC   Cardiovascular: Normal rate and regular rhythm.   Y MS CT and Y Pleural CT to wall suction with SS output  Sternal dressing clean, dry intact   Pulmonary/Chest: Breath sounds normal. No respiratory distress.   Extubated on 4L NC   Abdominal: Soft. She exhibits no distension and no mass. There is no tenderness. There is no rebound and no guarding.   Musculoskeletal: She exhibits no edema.   L radial A-line   Neurological: She is alert and oriented to person, place, and time.   Skin: Skin is warm and dry.       Vents:  Vent Mode: Spont (08/12/19 2100)  Set Rate: 16 bmp (08/12/19 2055)  Vt Set: 400 mL (08/12/19 2055)  Pressure Support: 7 cmH20 (08/12/19 2100)  PEEP/CPAP: 5 cmH20 (08/12/19 2100)  Oxygen Concentration (%): 36 (08/13/19 0619)  Peak Airway Pressure: 12  cmH2O (08/12/19 2100)  Plateau Pressure: 0 cmH20 (08/12/19 2100)  Total Ve: 5.56 mL (08/12/19 2100)  Negative Inspiratory Force (cm H2O): -37 (08/12/19 2055)    Lines/Drains/Airways     Central Venous Catheter Line                 Percutaneous Central Line Insertion/Assessment - Quad lumen  08/12/19 0745 1 day         Percutaneous Central Line Insertion/Assessment - quad lumen  08/12/19 0745 1 day          Drain                 Urethral Catheter 08/12/19 0736 Straight-tip;Temperature probe;Non-latex 1 day         Chest Tube 08/12/19 1303 3 Left Pleural 19 Fr. less than 1 day         Y Chest Tube 1 and 2 08/12/19 1302 Right Mediastinal 19 Fr. Left Mediastinal 19 Fr. less than 1 day          Arterial Line                 Arterial Line 08/12/19 0717 Left Radial 1 day          Line                 Pacer Wires 08/12/19 1257 less than 1 day          Peripheral Intravenous Line                 Peripheral IV - Single Lumen 07/10/19 1324 22 G Anterior;Left Forearm 33 days         Peripheral IV - Single Lumen 08/12/19 0600 18 G Right Forearm 1 day                Significant Labs:    CBC/Anemia Profile:  Recent Labs   Lab 08/12/19  1530 08/12/19  1731 08/13/19 0259   WBC 11.00  --  6.02   HGB 9.2*  --  8.5*   HCT 28.5* 25* 27.0*     --  122*   MCV 87  --  88   RDW 14.6*  --  14.7*        Chemistries:  Recent Labs   Lab 08/12/19  1530 08/12/19  2019 08/13/19 0259 08/13/19 0813     --  138  --    K 4.0 4.4 4.2 4.0     --  107  --    CO2 22*  --  23  --    BUN 16  --  15  --    CREATININE 0.8  --  0.9  --    CALCIUM 7.3*  --  7.7*  --    MG 2.1  2.1  --  1.7  --    PHOS 3.6  3.6  --  3.5  --        Coagulation:   Recent Labs   Lab 08/13/19 0259   INR 1.0   APTT 23.1       Significant Imaging:  CXR: I have reviewed all pertinent results/findings within the past 24 hours and my personal findings are:        Previously present endotracheal and enteric tubes have been removed.  Linear opacities consistent  with subsegmental atelectasis in the left mid/lower lung zones are again identified, but allowing for a poorer inspiratory depth level on the current examination there has been no significant detrimental interval change in the appearance of the chest since 08/12/2019 at 16:46.  No pneumothorax.

## 2019-08-13 NOTE — NURSING TRANSFER
Nursing Transfer Note      8/13/2019     Transfer From: SICU    Transfer via bed    Transfer with 2L to O2, cardiac monitoring    Transported by RNx2    Medicines sent: Insulin    Chart send with patient: Yes    Notified: family    Patient reassessed at: 1830 8/13/2019    Upon arrival to floor: cardiac monitor applied, patient oriented to room, call bell in reach and bed in lowest position

## 2019-08-13 NOTE — PT/OT/SLP EVAL
Physical Therapy Evaluation    Patient Name:  Kaylee Romero   MRN:  [id number]  Admit Date: 8/12/2019  Admitting Diagnosis:  S/P CABG (coronary artery bypass graft)   Length of Stay: 1 days  Recent Surgery: Procedure(s) (LRB):  CORONARY ARTERY BYPASS GRAFT (CABG)X3 (N/A)  SURGICAL PROCUREMENT, VEIN, ENDOSCOPIC (Left) 1 Day Post-Op    Recommendations:     Discharge Recommendations:  home health PT   Discharge Equipment Recommendations: none   Barriers to discharge: Decreased caregiver support    Assessment:     Kaylee Romero is a 74 y.o. female admitted with a medical diagnosis of S/P CABG (coronary artery bypass graft).  She presents with the following impairments/functional limitations: decreased functional mobility, balance and strength. Pt limited by sternal precautions, required constant cues to maintain. Pt with lethargy this session as well limiting her performance. Kaylee Romero's deficits effect their ability to safely and independently participate in self-care tasks and functional mobility which increases their caregiver burden. Due to her physical therapy diagnosis of debility and deconditioning, they continue to benefit from acute PT services to address the following limitations: high fall risk, weakness, instability, and the need for supervised instruction of exercise. Kaylee Romero's deficits effect their roles and responsibilities in which they were able to complete prior to admit. Education was provided to patient & family regarding importance of continued participation in therapy, patient's progress and discharge disposition. Pt would benefit from an intensive and collaborative PT/OT/SLP therapy program to improve quality of life and focus on recovery of impairments.     Problem List: weakness, gait instability, impaired endurance, impaired balance, impaired self care skills, impaired functional mobilty, impaired cardiopulmonary response to activity, pain  Rehab Prognosis: Good;  "patient would benefit from acute skilled PT services to address these deficits and reach maximum level of function.      Plan:     During this hospitalization, patient to be seen 4 x/week to address the identified rehab impairments via gait training, therapeutic activities, therapeutic exercises, neuromuscular re-education and progress towards the established goals.    · Plan of Care Expires:  09/12/19    Subjective     Chief Complaint: "I need to have a BM. I haven't had one yet."  Patient/Family Comments/goals: to return home.  Pain/Comfort:  · Pain Rating 1: (c/o incisional pain, did not grade)    Living Environment:  Patient lives with  in a single family home with 4 PAUL.   Prior Level of Function: Patient reports being independent with mobility & with ADLs. Patient uses DME as follows: none. DME owned (not currently used): single point cane.  Roles/Repsonsibilities: Hand Dominance: right Work: no. Drive: yes. Managing Medicines/Managing Home: yes. Hobbies: traveling.    Patient reports they will have assistance from  upon discharge.    Objective:     Patient found with: arterial line, blood pressure cuff, pulse ox (continuous), telemetry, owens catheter, central line, chest tube, oxygen     General Precautions: Standard, Cardiac fall, sternal   Orthopedic Precautions:N/A   Braces: N/A   Body mass index is 45.54 kg/m².  Oxygen Device: Nasal Cannula 2L    Vitals:    08/13/19 1538   BP:    Pulse: 90   Resp: 18   Temp:        Exams:  · Mental Status: Patient is AxOx4 and follows all multi-step verbal commands. Pt is Alert and Cooperative during session.  · Skin Integrity: Visible skin intact  · Edema: None noted  · Sensation: Intact  · Hearing: Intact  · Vision:  Intact  · Postural Assessment: slouched posture and rounded shoulders  · Range of Motion:  · RUE: WFL  · LUE: WFL  · RLE: WFL  · LLE: WFL  · Strength Exam:  · Bilateral Upper Extremity Strength: grossly WFL  · Bilateral Lower Extremity " Strength: grossly WFL    Outcome Measures:  AM-PAC 6 CLICK MOBILITY  Turning over in bed (including adjusting bedclothes, sheets and blankets)?: 2  Sitting down on and standing up from a chair with arms (e.g., wheelchair, bedside commode, etc.): 3  Moving from lying on back to sitting on the side of the bed?: 3  Moving to and from a bed to a chair (including a wheelchair)?: 3  Need to walk in hospital room?: 3  Climbing 3-5 steps with a railing?: 2  Basic Mobility Total Score: 16     Functional Mobility:  Additional staff present: RN  Bed Mobility:   · Sit to Supine: moderate assistance; to right side of bed  · To maintain sternal precautions    Transfers:   · Sit <> Stand Transfer: minimum assistance with hand-held assist   · Stand <> Sit Transfer: minimum assistance with hand-held assist   · [id number] from EOB and [id number] from bedside chair  · Chair <> Bed Transfer: Step Transfer technique with minimum assistance with hand-held assist  · Bed on patient's right      Gait:   · Patient ambulated: 8 steps to bed   · Patient required: minimal assist  · Patient used:  hand-held assist   · Gait Pattern observed: 2-point gait  · Gait Deviation(s): shuffle gait, flexed posture and decreased chantell  · Impairments due to: pain  · Comments: pt required vc's for posture, sequence, sternal precautions and keeping eyes open    Therapeutic Activities & Exercises:   *Sternal Precautions: patient able to voice 1/3 precautions without assistance. Patient able to maintain precautions throughout session with moderate verbal cues.    Education:  Patient provided with daily orientation and goals of this PT session. Patient and family agreed to participate in session. Patient and family aware of patient's deficits and therapy progression. They were educated to transfer to bedside chair/bedside commode/bathroom with RN/PCT present. Encouraged patient to perform daily exercises & mobility to increase endurance and decrease effects of  bedrest. Time provided for therapeutic counseling and discussion of health disposition. All questions answered to patient's and family's satisfaction, within scope of PT practice; voiced no other concerns. White board updated in patient's room, RN notified of session.    Patient left supine, with head in midline, neutral pelvis & heels floated for skin protection with all lines intact, call button in reach and RN notified.    GOALS:   Multidisciplinary Problems     Physical Therapy Goals        Problem: Physical Therapy Goal    Goal Priority Disciplines Outcome Goal Variances Interventions   Physical Therapy Goal     PT, PT/OT Ongoing (interventions implemented as appropriate)     Description:  Goals to be met by: 2019    Patient will increase functional independence with mobility by performin. Supine <> sit with Contact Guard Assistance.  2. Sit <> stand transfer with Contact Guard Assistance using No Assistive Device.  3. Bed <> chair transfer via Stand Pivot with Contact Guard Assistance using No Assistive Device.  4. Gait  x 150 feet with Contact Guard Assistance using No Assistive Device to prepare for community ambulation and endurance activities.  5. Dynamic standing for 8 minutes with Contact Guard Assistance using Rolling Walker to prepare for functional tasks in standing.  6. Able to tolerate exercise for 15-20 reps with independence.  7. Patient is able to recall 3/3 sternal precautions without assistance.                         History:     Past Medical History:   Diagnosis Date    Acid reflux     Age-related osteoporosis without current pathological fracture 2019    Cataract     CKD (chronic kidney disease) stage 3, GFR 30-59 ml/min     Dry mouth     History of TIA (transient ischemic attack) 2018    Hyperlipidemia 2014    Hypertensive heart disease with diastolic heart failure 2012    Morbid obesity with body mass index (BMI) of 40.0 or higher 2016     KAMRAN (obstructive sleep apnea)     KAMRAN on CPAP 2016    Osteoporosis without current pathological fracture 2018    Physical deconditioning 2018    Status post total left knee replacement 2017    Type 2 diabetes mellitus with stage 3 chronic kidney disease, without long-term current use of insulin 2019       Past Surgical History:   Procedure Laterality Date    ankle surgery (l)      APPENDECTOMY       SECTION      CORONARY ARTERY BYPASS GRAFT (CABG)X3 N/A 2019    Performed by [facility] at [facility] OR 2ND FLR    DILATION AND CURETTAGE OF UTERUS      KNEE ARTHROSCOPY W/ DEBRIDEMENT      KNEE SURGERY Left 2017    TKR    Left heart cath Left 2019    Performed by Ronak Gibbons MD at [facility] CATH LAB    REPLACEMENT-KNEE-TOTAL Left 2017    [facility] at [facility] OR 2ND FLR    SURGICAL PROCUREMENT, VEIN, ENDOSCOPIC Left 2019    Performed by Peterson Ventura MD at [facility] OR 2ND FLR       Time Tracking:     PT Received On: 19  PT Start Time: 1318     PT Stop Time: 1345  PT Total Time (min): 27 min     Billable Minutes: Evaluation 12 and Therapeutic Activity 12      Roxana Bro PT, DPT  2019  Pager: [phone]

## 2019-08-13 NOTE — PHYSICIAN QUERY
PT Name: Kaylee Romero  MR #: [id number]     Physician Query Form - Documentation Clarification      CDS/: Bonita Squires               Contact information: [email or web address]      This form is a permanent document in the medical record.     Query Date: August 13, 2019    By submitting this query, we are merely seeking further clarification of documentation. Please utilize your independent clinical judgment when addressing the question(s) below.    The Medical record reflects the following:    Supporting Clinical Findings Location in Medical Record   Kaylee Romero is a 74 y.o. female who presents for pre-op CABG. Was seen as a consult in the hospital after she transferred from [facility] for NSTEMI.  She went to the ED there after experiencing severe left-sided chest pressure radiating to her arm which occurred suddenly before she was about to go to [facility].  She went inside and took a 500mg ASA and it gradually subsided.  She had a similar event several days earlier which similarly resolved but didn't seek care for it then, not thinking that it could be cardiac in origin.  There her initial troponin was 0.03, which went up to 0.3.  She was given ASA and plavix, but no heparin    2. NSTEMI     Admit Date:   08/12/2019   H&P                      H&P      H&P                                                                            Doctor, Please specify diagnosis or diagnoses associated with above clinical findings.    Provider Use Only        [  X  ] NSTEMI was less than 4 weeks prior to admission date 8/12/2019  [    ] NSTEMI was greater than 4 weeks prior to admission date 8/12/2019                                                                                                                         [  ] Clinically Undetermined

## 2019-08-13 NOTE — PROGRESS NOTES
[facility]  Critical Care - Surgery  Progress Note    Patient Name: Kaylee Romero  MRN: [id number]  Admission Date: 8/12/2019  Hospital Length of Stay: 1 days  Code Status: Full Code  Attending Provider: Peterson Ventura MD  Primary Care Provider: Liz Roberts MD   Principal Problem: S/P CABG (coronary artery bypass graft)    Subjective:     Hospital/ICU Course:  Patient underwent CABG x2 on 8/12 with Dr. Ventura. Preoperatively she was given a parasternal block as well as a dose of methadone for postoperative pain contro. Intraoperatively, the case proceeded without issue. She received 2u RBC, 2L crystalloid, and 660cc cell saver. There was a 40 min clamp time. TTE at the conclusion of the case showed her to have an EF of 55% and mild aortic insufficiency. Upon arrival to the SICU she was intubated and was quickly weaned to minimal vent settings. She initially presented on Levo 0.08, TXA gtt, and insulin gtt. She was extubated later in the evening when the effects of anesthesia lessened.     Interval History/Significant Events: No acute events overnight.  Extubated yesterday evening, satting well on 4L NC. Sleepy this morning, but vital signs stable.     Follow-up For: Procedure(s) (LRB):  CORONARY ARTERY BYPASS GRAFT (CABG)X3 (N/A)  SURGICAL PROCUREMENT, VEIN, ENDOSCOPIC (Left)    Post-Operative Day: 1 Day Post-Op    Objective:     Vital Signs (Most Recent):  Temp: 98.4 °F (36.9 °C) (08/13/19 0700)  Pulse: 89 (08/13/19 0815)  Resp: 18 (08/13/19 0703)  BP: 109/60 (08/13/19 0800)  SpO2: (!) 92 % (08/13/19 0815) Vital Signs (24h Range):  Temp:  [97.5 °F (36.4 °C)-98.4 °F (36.9 °C)] 98.4 °F (36.9 °C)  Pulse:  [84-99] 89  Resp:  [17-27] 18  SpO2:  [92 %-100 %] 92 %  BP: ()/(55-77) 109/60  Arterial Line BP: ()/(50-79) 108/54     Weight: 112.9 kg (249 lb)  Body mass index is 45.54 kg/m².      Intake/Output Summary (Last 24 hours) at 8/13/2019 0913  Last data filed at 8/13/2019  0800  Gross per 24 hour   Intake 2636.5 ml   Output 2198 ml   Net 438.5 ml       Physical Exam   Constitutional: She is oriented to person, place, and time. She appears well-developed.   Neck: No JVD present. No tracheal deviation present.   RIJ Quad lumen CVC   Cardiovascular: Normal rate and regular rhythm.   Y MS CT and Y Pleural CT to wall suction with SS output  Sternal dressing clean, dry intact   Pulmonary/Chest: Breath sounds normal. No respiratory distress.   Extubated on 4L NC   Abdominal: Soft. She exhibits no distension and no mass. There is no tenderness. There is no rebound and no guarding.   Musculoskeletal: She exhibits no edema.   L radial A-line   Neurological: She is alert and oriented to person, place, and time.   Skin: Skin is warm and dry.       Vents:  Vent Mode: Spont (08/12/19 2100)  Set Rate: 16 bmp (08/12/19 2055)  Vt Set: 400 mL (08/12/19 2055)  Pressure Support: 7 cmH20 (08/12/19 2100)  PEEP/CPAP: 5 cmH20 (08/12/19 2100)  Oxygen Concentration (%): 36 (08/13/19 0619)  Peak Airway Pressure: 12 cmH2O (08/12/19 2100)  Plateau Pressure: 0 cmH20 (08/12/19 2100)  Total Ve: 5.56 mL (08/12/19 2100)  Negative Inspiratory Force (cm H2O): -37 (08/12/19 2055)    Lines/Drains/Airways     Central Venous Catheter Line                 Percutaneous Central Line Insertion/Assessment - Quad lumen  08/12/19 0745 1 day         Percutaneous Central Line Insertion/Assessment - quad lumen  08/12/19 0745 1 day          Drain                 Urethral Catheter 08/12/19 0736 Straight-tip;Temperature probe;Non-latex 1 day         Chest Tube 08/12/19 1303 3 Left Pleural 19 Fr. less than 1 day         Y Chest Tube 1 and 2 08/12/19 1302 Right Mediastinal 19 Fr. Left Mediastinal 19 Fr. less than 1 day          Arterial Line                 Arterial Line 08/12/19 0717 Left Radial 1 day          Line                 Pacer Wires 08/12/19 1257 less than 1 day          Peripheral Intravenous Line                 Peripheral IV  - Single Lumen 07/10/19 1324 22 G Anterior;Left Forearm 33 days         Peripheral IV - Single Lumen 08/12/19 0600 18 G Right Forearm 1 day                Significant Labs:    CBC/Anemia Profile:  Recent Labs   Lab 08/12/19  1530 08/12/19  1731 08/13/19  0259   WBC 11.00  --  6.02   HGB 9.2*  --  8.5*   HCT 28.5* 25* 27.0*     --  122*   MCV 87  --  88   RDW 14.6*  --  14.7*        Chemistries:  Recent Labs   Lab 08/12/19  1530 08/12/19 2019 08/13/19  0259 08/13/19  0813     --  138  --    K 4.0 4.4 4.2 4.0     --  107  --    CO2 22*  --  23  --    BUN 16  --  15  --    CREATININE 0.8  --  0.9  --    CALCIUM 7.3*  --  7.7*  --    MG 2.1  2.1  --  1.7  --    PHOS 3.6  3.6  --  3.5  --        Coagulation:   Recent Labs   Lab 08/13/19 0259   INR 1.0   APTT 23.1       Significant Imaging:  CXR: I have reviewed all pertinent results/findings within the past 24 hours and my personal findings are:        Previously present endotracheal and enteric tubes have been removed.  Linear opacities consistent with subsegmental atelectasis in the left mid/lower lung zones are again identified, but allowing for a poorer inspiratory depth level on the current examination there has been no significant detrimental interval change in the appearance of the chest since 08/12/2019 at 16:46.  No pneumothorax.    Assessment/Plan:     * S/P CABG (coronary artery bypass graft)  Patient is a 73yo female s/p CABG x2 with Dr. Ventura on 8/12/19. PMH significnat for CAD, HTN, hx TIA.     Neuro:   - Sedation: none   - Analgesia: oxycodone and dilaudid prn  - History of TIA, no acute findings    Pulmonary:   - Extubated on 4L NC  - Respiratory treatment q4h  - IS  - ABG prn and daily    Cardiac:   - Pressors: none  - Transition from cardene to hydralazine PRN for MAP<80 and SBP<140  - MAP goals 60-80  - Continue to monitor CT output - plan to d/c when output <150cc/24h    Renal:   - Monitor UOP with Strict I&Os  - Trend BUN/Cr  -  Guevara catheter in place  - Potential diuresis later today - will re-evaluate fluid status/hemodynamics this afternoon    FEN/GI:  - NPO, sips with meds  - mIVF: NaCl @10cc/hr  - replete electrolytes prn  - BMP daily    Heme/ID:  - CBC daily  - Hb/Hct stable  - Antibiotics: perioperative clindamycin  - Cultures: none     Endo:   - Insulin gtt  - trend POCT glucose  - BGL goal 140-180    Prophylaxis:  - DVT: SCDs, heparin subq  - GI: Famotidine BID    Dispo:   - Continue SICU Care  - Potential stepdown to CTSU later today        Macy Chavez MD  Critical Care - Surgery  [facility]

## 2019-08-13 NOTE — PLAN OF CARE
Problem: Occupational Therapy Goal  Goal: Occupational Therapy Goal  Goals to be met by: 8/27/19     Patient will increase functional independence with ADLs by performing:    UE Dressing with [place].  LE Dressing with [place].  Grooming while standing at sink with [place].  Toileting from toilet with [place] for hygiene and clothing management.   Bathing from shower chair with [place].  Toilet transfer to toilet with [place].  Increased functional strength to WFL for B UE.  Upper extremity exercise program x15 reps per handout, with independence.    POC initiated.

## 2019-08-13 NOTE — PROGRESS NOTES
"[facility]  Endocrinology  Progress Note    Admit Date: 8/12/2019     Reason for Consult: Management of T2DM, Hyperglycemia     Surgical Procedure and Date: CABG 8/12/19    Diabetes diagnosis year: YSABEL    Lab Results   Component Value Date    HGBA1C 7.5 (H) 08/07/2019       Home Diabetes Medications: Levemir 8 q HS, Glipizide 20 mg daily, Trulicity 1.5 mg SQ once weekly (per chart review)    How often checking glucose at home? YSABEL  BG readings on regimen: YSABEL  Hypoglycemia on the regimen?  YSABEL  Missed doses on regimen? YSABEL    Diabetes Complications include:     YSABEL    Complicating diabetes co morbidities:   History of MI, KAMRAN and CKD      HPI:   Patient is a 74 y.o. female with a diagnosis of DM2, KAMRAN, CKD, and CAD, who is s/p CABG on 8/12/19.  Patient has a complex diabetic history, on triple therapy.  Last seen in [facility] end[facility]ology clinic by MAURICIO Pritchard NP on 5/23/19.  Positive family history of DM (father).  Endocrinology consulted for DM/BG management.            Interval HPI:   Overnight events: Remains in ICU. Extubated. BG at goal on IV insulin infusion rates 0.5- 1.5 u/hr.    Eating:   NPO; diet advancing   Nausea: No  Hypoglycemia and intervention: No  Fever: No  TPN and/or TF: No    /60 (BP Location: Right arm, Patient Position: Lying)   Pulse 89   Temp 98.4 °F (36.9 °C) (Oral)   Resp 18   Ht 5' 2" (1.575 m)   Wt 112.9 kg (249 lb)   LMP 01/09/2001 (Approximate)   SpO2 (!) 92%   Breastfeeding? No   BMI 45.54 kg/m²      Labs Reviewed and Include    Recent Labs   Lab 08/13/19  0259   *   CALCIUM 7.7*      K 4.2   CO2 23      BUN 15   CREATININE 0.9     Lab Results   Component Value Date    WBC 6.02 08/13/2019    HGB 8.5 (L) 08/13/2019    HCT 27.0 (L) 08/13/2019    MCV 88 08/13/2019     (L) 08/13/2019     No results for input(s): TSH, FREET4 in the last 168 hours.  Lab Results   Component Value Date    HGBA1C 7.5 (H) 08/07/2019       Nutritional " status:   Body mass index is 45.54 kg/m².  Lab Results   Component Value Date    ALBUMIN 3.3 (L) 08/07/2019    ALBUMIN 3.3 (L) 08/01/2019    ALBUMIN 3.0 (L) 07/09/2019     No results found for: PREALBUMIN    Estimated Creatinine Clearance: 67.1 mL/min (based on SCr of 0.9 mg/dL).    Accu-Checks  Recent Labs     08/12/19  2205 08/12/19  2304 08/13/19  0015 08/13/19  0103 08/13/19  0202 08/13/19  0305 08/13/19  0404 08/13/19  0511 08/13/19  0607 08/13/19  0719   POCTGLUCOSE 161* 168* 166* 186* 149* 177* 147* 161* 159* 169*       Current Medications and/or Treatments Impacting Glycemic Control  Immunotherapy:    Immunosuppressants     None        Steroids:   Hormones (From admission, onward)    None        Pressors:    Autonomic Drugs (From admission, onward)    None        Hyperglycemia/Diabetes Medications:   Antihyperglycemics (From admission, onward)    Start     Stop Route Frequency Ordered    08/12/19 1715  insulin regular (Humulin R) 100 Units in sodium chloride 0.9% 100 mL infusion     Question:  Insulin Rate Adjustment (DO NOT MODIFY ANSWER)  Answer:  \\[email or web address]    -- IV Continuous 08/12/19 1600          ASSESSMENT and PLAN    * S/P CABG (coronary artery bypass graft)  S/p CABG on 8/12/19  Managed per primary team  Avoid hypoglycemia  Optimize BG control for surgical wound healing        Type 2 diabetes mellitus with stage 3 chronic kidney disease, without long-term current use of insulin  BG goal 140 - 180     Change to transition insulin infusion at 0.5 u/hr.   BG monitoring ac/hs/0200 and low dose correction scale coverage.           Discharge planning: TBD        CKD (chronic kidney disease) stage 3, GFR 30-59 ml/min  Titrate insulin slowly to avoid hypoglycemia as the risk of hypoglycemia increases with decreased creatinine clearance.  Caution with insulin stacking  Estimated Creatinine Clearance: 67.1 mL/min (based on SCr of 0.9  mg/dL).        Morbid obesity with body mass index (BMI) of 40.0 or higher  may contribute to insulin resistance  Body mass index is 45.54 kg/m².            Rachel Guerrero NP  Endocrinology  [facility]

## 2019-08-13 NOTE — PLAN OF CARE
"Problem: Adult Inpatient Plan of Care  Goal: Plan of Care Review  8/12: Admit to SICU s/p CABG x 3. Levo on. 500 albumin. Propofol d/c post-op. 1 amp bicarb.  Insulin and cardene started  8/13: levo off.   Outcome: Ongoing (interventions implemented as appropriate)  Dx: S/P CABG (coronary artery bypass graft)    Shift Events: patient extubated. Vitals stable. cardene started     Neuro: AAO x4, Follows Commands and Moves All Extremities    Vital Signs: BP (!) 107/59   Pulse 90   Temp 98.1 °F (36.7 °C) (Axillary)   Resp 17   Ht 5' 2" (1.575 m)   Wt 112.9 kg (249 lb)   LMP 01/09/2001 (Approximate)   SpO2 (!) 94%   Breastfeeding? No   BMI 45.54 kg/m²     Diet: NPO    Gtts: Insulin and Nicardipine    Urine Output: Urinary Catheter 40-60 cc/hour     Drains: pleural chest tube 48 cc output/shift  Medial output 90cc/shift      Labs/Accuchecks: Q1 accuchecks.    Skin: no skin breakdown noted . Mid sternal incision dressing clean dry and intact.          "

## 2019-08-14 PROBLEM — D62 ACUTE BLOOD LOSS ANEMIA: Status: ACTIVE | Noted: 2019-08-14

## 2019-08-14 PROBLEM — E83.39 HYPOPHOSPHATEMIA: Status: ACTIVE | Noted: 2019-08-14

## 2019-08-14 LAB
ANION GAP SERPL CALC-SCNC: 7 MMOL/L (ref 8–16)
APTT BLDCRRT: 25.6 SEC (ref 21–32)
BASOPHILS # BLD AUTO: 0.01 K/UL (ref 0–0.2)
BASOPHILS NFR BLD: 0.1 % (ref 0–1.9)
BUN SERPL-MCNC: 18 MG/DL (ref 8–23)
CALCIUM SERPL-MCNC: 8.2 MG/DL (ref 8.7–10.5)
CHLORIDE SERPL-SCNC: 104 MMOL/L (ref 95–110)
CO2 SERPL-SCNC: 26 MMOL/L (ref 23–29)
CREAT SERPL-MCNC: 1 MG/DL (ref 0.5–1.4)
DIFFERENTIAL METHOD: ABNORMAL
EOSINOPHIL # BLD AUTO: 0.1 K/UL (ref 0–0.5)
EOSINOPHIL NFR BLD: 0.8 % (ref 0–8)
ERYTHROCYTE [DISTWIDTH] IN BLOOD BY AUTOMATED COUNT: 15.2 % (ref 11.5–14.5)
EST. GFR  (AFRICAN AMERICAN): >60 ML/MIN/1.73 M^2
EST. GFR  (NON AFRICAN AMERICAN): 56.4 ML/MIN/1.73 M^2
GLUCOSE SERPL-MCNC: 157 MG/DL (ref 70–110)
HCT VFR BLD AUTO: 26.1 % (ref 37–48.5)
HGB BLD-MCNC: 8.1 G/DL (ref 12–16)
IMM GRANULOCYTES # BLD AUTO: 0.03 K/UL (ref 0–0.04)
IMM GRANULOCYTES NFR BLD AUTO: 0.4 % (ref 0–0.5)
INR PPP: 1 (ref 0.8–1.2)
LYMPHOCYTES # BLD AUTO: 1 K/UL (ref 1–4.8)
LYMPHOCYTES NFR BLD: 12.5 % (ref 18–48)
MAGNESIUM SERPL-MCNC: 2.2 MG/DL (ref 1.6–2.6)
MCH RBC QN AUTO: 27.7 PG (ref 27–31)
MCHC RBC AUTO-ENTMCNC: 31 G/DL (ref 32–36)
MCV RBC AUTO: 89 FL (ref 82–98)
MONOCYTES # BLD AUTO: 0.7 K/UL (ref 0.3–1)
MONOCYTES NFR BLD: 9 % (ref 4–15)
NEUTROPHILS # BLD AUTO: 5.9 K/UL (ref 1.8–7.7)
NEUTROPHILS NFR BLD: 77.2 % (ref 38–73)
NRBC BLD-RTO: 0 /100 WBC
PHOSPHATE SERPL-MCNC: 1.9 MG/DL (ref 2.7–4.5)
PLATELET # BLD AUTO: 126 K/UL (ref 150–350)
PMV BLD AUTO: 10.4 FL (ref 9.2–12.9)
POCT GLUCOSE: 159 MG/DL (ref 70–110)
POCT GLUCOSE: 169 MG/DL (ref 70–110)
POCT GLUCOSE: 177 MG/DL (ref 70–110)
POCT GLUCOSE: 189 MG/DL (ref 70–110)
POCT GLUCOSE: 213 MG/DL (ref 70–110)
POTASSIUM SERPL-SCNC: 4.8 MMOL/L (ref 3.5–5.1)
POTASSIUM SERPL-SCNC: 4.8 MMOL/L (ref 3.5–5.1)
PROTHROMBIN TIME: 10.2 SEC (ref 9–12.5)
RBC # BLD AUTO: 2.92 M/UL (ref 4–5.4)
SODIUM SERPL-SCNC: 137 MMOL/L (ref 136–145)
WBC # BLD AUTO: 7.58 K/UL (ref 3.9–12.7)

## 2019-08-14 PROCEDURE — 63600175 PHARM REV CODE 636 W HCPCS: Performed by: STUDENT IN AN ORGANIZED HEALTH CARE EDUCATION/TRAINING PROGRAM

## 2019-08-14 PROCEDURE — 84100 ASSAY OF PHOSPHORUS: CPT

## 2019-08-14 PROCEDURE — 97802 MEDICAL NUTRITION INDIV IN: CPT

## 2019-08-14 PROCEDURE — 94761 N-INVAS EAR/PLS OXIMETRY MLT: CPT

## 2019-08-14 PROCEDURE — 36415 COLL VENOUS BLD VENIPUNCTURE: CPT

## 2019-08-14 PROCEDURE — 80048 BASIC METABOLIC PNL TOTAL CA: CPT

## 2019-08-14 PROCEDURE — 25000003 PHARM REV CODE 250: Performed by: SURGERY

## 2019-08-14 PROCEDURE — 85025 COMPLETE CBC W/AUTO DIFF WBC: CPT

## 2019-08-14 PROCEDURE — 25000003 PHARM REV CODE 250: Performed by: PHYSICIAN ASSISTANT

## 2019-08-14 PROCEDURE — 83735 ASSAY OF MAGNESIUM: CPT

## 2019-08-14 PROCEDURE — 97530 THERAPEUTIC ACTIVITIES: CPT

## 2019-08-14 PROCEDURE — 63600175 PHARM REV CODE 636 W HCPCS: Performed by: SURGERY

## 2019-08-14 PROCEDURE — 99232 PR SUBSEQUENT HOSPITAL CARE,LEVL II: ICD-10-PCS | Mod: ,,, | Performed by: NURSE PRACTITIONER

## 2019-08-14 PROCEDURE — 20600001 HC STEP DOWN PRIVATE ROOM

## 2019-08-14 PROCEDURE — 25000003 PHARM REV CODE 250: Performed by: THORACIC SURGERY (CARDIOTHORACIC VASCULAR SURGERY)

## 2019-08-14 PROCEDURE — 63600175 PHARM REV CODE 636 W HCPCS: Performed by: PHYSICIAN ASSISTANT

## 2019-08-14 PROCEDURE — 63600175 PHARM REV CODE 636 W HCPCS: Performed by: NURSE PRACTITIONER

## 2019-08-14 PROCEDURE — 99232 SBSQ HOSP IP/OBS MODERATE 35: CPT | Mod: ,,, | Performed by: NURSE PRACTITIONER

## 2019-08-14 PROCEDURE — 63600175 PHARM REV CODE 636 W HCPCS: Performed by: THORACIC SURGERY (CARDIOTHORACIC VASCULAR SURGERY)

## 2019-08-14 PROCEDURE — 85610 PROTHROMBIN TIME: CPT

## 2019-08-14 PROCEDURE — S5571 INSULIN DISPOS PEN 3 ML: HCPCS | Performed by: NURSE PRACTITIONER

## 2019-08-14 PROCEDURE — 97535 SELF CARE MNGMENT TRAINING: CPT

## 2019-08-14 PROCEDURE — 85730 THROMBOPLASTIN TIME PARTIAL: CPT

## 2019-08-14 PROCEDURE — 97116 GAIT TRAINING THERAPY: CPT

## 2019-08-14 RX ORDER — LOSARTAN POTASSIUM 50 MG/1
50 TABLET ORAL DAILY
Status: DISCONTINUED | OUTPATIENT
Start: 2019-08-15 | End: 2019-08-21 | Stop reason: HOSPADM

## 2019-08-14 RX ORDER — METOPROLOL TARTRATE 50 MG/1
50 TABLET ORAL 2 TIMES DAILY
Status: DISCONTINUED | OUTPATIENT
Start: 2019-08-14 | End: 2019-08-15

## 2019-08-14 RX ORDER — METOPROLOL TARTRATE 25 MG/1
25 TABLET, FILM COATED ORAL 2 TIMES DAILY
Status: DISCONTINUED | OUTPATIENT
Start: 2019-08-14 | End: 2019-08-14

## 2019-08-14 RX ORDER — INSULIN ASPART 100 [IU]/ML
4 INJECTION, SOLUTION INTRAVENOUS; SUBCUTANEOUS
Status: DISCONTINUED | OUTPATIENT
Start: 2019-08-14 | End: 2019-08-16

## 2019-08-14 RX ORDER — FUROSEMIDE 10 MG/ML
20 INJECTION INTRAMUSCULAR; INTRAVENOUS 2 TIMES DAILY
Status: DISCONTINUED | OUTPATIENT
Start: 2019-08-14 | End: 2019-08-21

## 2019-08-14 RX ORDER — METOPROLOL TARTRATE 25 MG/1
25 TABLET, FILM COATED ORAL ONCE
Status: COMPLETED | OUTPATIENT
Start: 2019-08-14 | End: 2019-08-14

## 2019-08-14 RX ADMIN — POLYETHYLENE GLYCOL 3350 17 G: 17 POWDER, FOR SOLUTION ORAL at 10:08

## 2019-08-14 RX ADMIN — HYDRALAZINE HYDROCHLORIDE 10 MG: 20 INJECTION INTRAMUSCULAR; INTRAVENOUS at 03:08

## 2019-08-14 RX ADMIN — HEPARIN SODIUM 5000 UNITS: 5000 INJECTION, SOLUTION INTRAVENOUS; SUBCUTANEOUS at 09:08

## 2019-08-14 RX ADMIN — ASPIRIN 325 MG: 325 TABLET, DELAYED RELEASE ORAL at 10:08

## 2019-08-14 RX ADMIN — METOPROLOL TARTRATE 50 MG: 50 TABLET ORAL at 09:08

## 2019-08-14 RX ADMIN — MUPIROCIN 1 G: 20 OINTMENT TOPICAL at 10:08

## 2019-08-14 RX ADMIN — ATORVASTATIN CALCIUM 40 MG: 20 TABLET, FILM COATED ORAL at 09:08

## 2019-08-14 RX ADMIN — HEPARIN SODIUM 5000 UNITS: 5000 INJECTION, SOLUTION INTRAVENOUS; SUBCUTANEOUS at 01:08

## 2019-08-14 RX ADMIN — INSULIN ASPART 1 UNITS: 100 INJECTION, SOLUTION INTRAVENOUS; SUBCUTANEOUS at 01:08

## 2019-08-14 RX ADMIN — METOPROLOL TARTRATE 25 MG: 25 TABLET ORAL at 10:08

## 2019-08-14 RX ADMIN — FUROSEMIDE 20 MG: 10 INJECTION, SOLUTION INTRAMUSCULAR; INTRAVENOUS at 10:08

## 2019-08-14 RX ADMIN — INSULIN ASPART 1 UNITS: 100 INJECTION, SOLUTION INTRAVENOUS; SUBCUTANEOUS at 05:08

## 2019-08-14 RX ADMIN — PANTOPRAZOLE SODIUM 40 MG: 40 TABLET, DELAYED RELEASE ORAL at 06:08

## 2019-08-14 RX ADMIN — LOSARTAN POTASSIUM 25 MG: 25 TABLET, FILM COATED ORAL at 10:08

## 2019-08-14 RX ADMIN — POTASSIUM CHLORIDE 20 MEQ: 1500 TABLET, EXTENDED RELEASE ORAL at 09:08

## 2019-08-14 RX ADMIN — HEPARIN SODIUM 5000 UNITS: 5000 INJECTION, SOLUTION INTRAVENOUS; SUBCUTANEOUS at 06:08

## 2019-08-14 RX ADMIN — MUPIROCIN 1 G: 20 OINTMENT TOPICAL at 09:08

## 2019-08-14 RX ADMIN — ONDANSETRON 4 MG: 2 INJECTION INTRAMUSCULAR; INTRAVENOUS at 06:08

## 2019-08-14 RX ADMIN — DOCUSATE SODIUM 100 MG: 100 CAPSULE, LIQUID FILLED ORAL at 09:08

## 2019-08-14 RX ADMIN — LOSARTAN POTASSIUM 25 MG: 25 TABLET, FILM COATED ORAL at 01:08

## 2019-08-14 RX ADMIN — SODIUM PHOSPHATE, MONOBASIC, MONOHYDRATE 30 MMOL: 276; 142 INJECTION, SOLUTION INTRAVENOUS at 10:08

## 2019-08-14 RX ADMIN — INSULIN DETEMIR 8 UNITS: 100 INJECTION, SOLUTION SUBCUTANEOUS at 09:08

## 2019-08-14 RX ADMIN — HYDRALAZINE HYDROCHLORIDE 10 MG: 20 INJECTION INTRAMUSCULAR; INTRAVENOUS at 06:08

## 2019-08-14 RX ADMIN — DOCUSATE SODIUM 100 MG: 100 CAPSULE, LIQUID FILLED ORAL at 10:08

## 2019-08-14 RX ADMIN — METOPROLOL TARTRATE 25 MG: 25 TABLET ORAL at 01:08

## 2019-08-14 RX ADMIN — INSULIN ASPART 4 UNITS: 100 INJECTION, SOLUTION INTRAVENOUS; SUBCUTANEOUS at 05:08

## 2019-08-14 RX ADMIN — FUROSEMIDE 20 MG: 10 INJECTION, SOLUTION INTRAMUSCULAR; INTRAVENOUS at 06:08

## 2019-08-14 NOTE — PROGRESS NOTES
"[facility]  Endocrinology  Progress Note    Admit Date: 2019     Reason for Consult: Management of T2DM, Hyperglycemia     Surgical Procedure and Date: CABG 19    Diabetes diagnosis year:     Lab Results   Component Value Date    HGBA1C 7.5 (H) 2019       Home Diabetes Medications: Levemir 8 q HS, Glipizide 20 mg daily, Trulicity 1.5 mg SQ once weekly    How often checking glucose at home?  Once daily   BG readings on regimen: 100s  Hypoglycemia on the regimen?  no  Missed doses on regimen? no    Diabetes Complications include:     none  Complicating diabetes co morbidities:   History of MI, KAMRAN and CKD      HPI:   Patient is a 74 y.o. female with a diagnosis of DM2, KAMRAN, CKD, and CAD, who is s/p CABG on 19.  Patient has a complex diabetic history, on triple therapy.  Last seen in [facility] by MAURICIO Pritchard NP on 19.  Positive family history of DM (father).  Endocrinology consulted for DM/BG management.            Interval HPI:   Overnight events: Transferred to [facility]. BG slightly above goal overnight on IV insulin infusion at 0.5 u/hr; required correction scale x1.    Eatin% - 100%   Nausea: No  Hypoglycemia and intervention: No  Fever: No  TPN and/or TF: No    /71 (BP Location: Left arm, Patient Position: Sitting)   Pulse 107   Temp 98.1 °F (36.7 °C) (Oral)   Resp 20   Ht 5' 2" (1.575 m)   Wt 107.5 kg (236 lb 15.9 oz)   LMP 2001 (Approximate)   SpO2 (!) 90%   Breastfeeding? No   BMI 43.35 kg/m²      Labs Reviewed and Include    Recent Labs   Lab 19  0458   *   CALCIUM 8.2*      K 4.8  4.8   CO2 26      BUN 18   CREATININE 1.0     Lab Results   Component Value Date    WBC 7.58 2019    HGB 8.1 (L) 2019    HCT 26.1 (L) 2019    MCV 89 2019     (L) 2019     No results for input(s): TSH, FREET4 in the last 168 hours.  Lab Results   Component Value Date    HGBA1C 7.5 (H) " 08/07/2019       Nutritional status:   Body mass index is 43.35 kg/m².  Lab Results   Component Value Date    ALBUMIN 3.3 (L) 08/07/2019    ALBUMIN 3.3 (L) 08/01/2019    ALBUMIN 3.0 (L) 07/09/2019     No results found for: PREALBUMIN    Estimated Creatinine Clearance: 58.7 mL/min (based on SCr of 1 mg/dL).    Accu-Checks  Recent Labs     08/13/19  0511 08/13/19  0607 08/13/19  0719 08/13/19  0808 08/13/19  1708 08/13/19  1711 08/13/19  2104 08/13/19  2108 08/14/19  0111 08/14/19  0839   POCTGLUCOSE 161* 159* 169* 170* 245* 221* 176* 197* 159* 169*       Current Medications and/or Treatments Impacting Glycemic Control  Immunotherapy:    Immunosuppressants     None        Steroids:   Hormones (From admission, onward)    None        Pressors:    Autonomic Drugs (From admission, onward)    None        Hyperglycemia/Diabetes Medications:   Antihyperglycemics (From admission, onward)    Start     Stop Route Frequency Ordered    08/13/19 0945  insulin regular (Humulin R) 100 Units in sodium chloride 0.9% 100 mL infusion      -- IV Continuous 08/13/19 0837    08/13/19 0937  insulin aspart U-100 pen 0-4 Units      -- SubQ As needed (PRN) 08/13/19 0837          ASSESSMENT and PLAN    * S/P CABG (coronary artery bypass graft)  S/p CABG on 8/12/19  Managed per primary team  Avoid hypoglycemia  Optimize BG control for surgical wound healing        Type 2 diabetes mellitus with stage 3 chronic kidney disease, without long-term current use of insulin  BG goal 140 - 180     continue transition insulin infusion at 0.5 u/hr; discontinue IV insulin tonight and start levemir 8 units HS.   BG monitoring ac/hs/0200 and low dose correction scale coverage.   Start novolog 4 units with meals.           Discharge planning: TBD        CKD (chronic kidney disease) stage 3, GFR 30-59 ml/min  Titrate insulin slowly to avoid hypoglycemia as the risk of hypoglycemia increases with decreased creatinine clearance.  Caution with insulin  stacking  Estimated Creatinine Clearance: 67.1 mL/min (based on SCr of 0.9 mg/dL).        Morbid obesity with body mass index (BMI) of 40.0 or higher  may contribute to insulin resistance  Body mass index is 45.54 kg/m².            Rachel Guerrero NP  Endocrinology  [facility]

## 2019-08-14 NOTE — PROGRESS NOTES
[facility]  Cardiothoracic Surgery  Progress Note    Patient Name: Kaylee Romero  MRN: [id number]  Admission Date: 8/12/2019  Hospital Length of Stay: 2 days  Code Status: Prior   Attending Physician: Peterson Ventura MD   Referring Provider: Peterson Ventura MD  Principal Problem:S/P CABG (coronary artery bypass graft)            Subjective:     Post-Op Info:  Procedure(s) (LRB):  CORONARY ARTERY BYPASS GRAFT (CABG)X3 (N/A)  SURGICAL PROCUREMENT, VEIN, ENDOSCOPIC (Left)   2 Days Post-Op     Interval History: NAEON. Pt will work on ambulating today. Has some discomfort around sternotomy and leg incision sites, which is to be expected post operatively.     Review of Systems   Constitution: Negative for malaise/fatigue.   Cardiovascular: Positive for chest pain and leg swelling. Negative for dyspnea on exertion, irregular heartbeat and palpitations.   Respiratory: Negative for cough and shortness of breath.    Hematologic/Lymphatic: Negative for bleeding problem.   Gastrointestinal: Negative for abdominal pain.   Genitourinary: Negative for dysuria.   Neurological: Positive for weakness. Negative for headaches.     Medications:  Continuous Infusions:   insulin (HUMAN R) infusion (adults) 0.5 Units/hr (08/13/19 1700)     Scheduled Meds:   aspirin  325 mg Oral Daily    atorvastatin  40 mg Oral QHS    docusate sodium  100 mg Oral BID    furosemide  20 mg Intravenous BID    heparin (porcine)  5,000 Units Subcutaneous Q8H    losartan  25 mg Oral Daily    metoprolol tartrate  25 mg Oral BID    mupirocin  1 g Nasal BID    pantoprazole  40 mg Oral Before breakfast    polyethylene glycol  17 g Oral Daily    potassium chloride  20 mEq Oral Q12H    sodium phosphate IVPB  30 mmol Intravenous Once     PRN Meds:acetaminophen, bisacodyl, Dextrose 10% Bolus, Dextrose 10% Bolus, glucagon (human recombinant), glucose, glucose, hydrALAZINE, insulin aspart U-100, metoclopramide HCl, ondansetron,  oxyCODONE, oxyCODONE, sodium chloride 0.9%     Objective:     Vital Signs (Most Recent):  Temp: 98.1 °F (36.7 °C) (08/14/19 0424)  Pulse: 105 (08/14/19 0743)  Resp: 18 (08/14/19 0424)  BP: (!) 151/80 (08/14/19 0622)  SpO2: 100 % (08/14/19 0424) Vital Signs (24h Range):  Temp:  [97.5 °F (36.4 °C)-99 °F (37.2 °C)] 98.1 °F (36.7 °C)  Pulse:  [] 105  Resp:  [14-18] 18  SpO2:  [69 %-100 %] 100 %  BP: (102-162)/(50-88) 151/80  Arterial Line BP: ()/(41-73) 109/70     Weight: 107.5 kg (236 lb 15.9 oz)  Body mass index is 43.35 kg/m².    SpO2: 100 %  O2 Device (Oxygen Therapy): nasal cannula    Intake/Output - Last 3 Shifts       08/12 0700 - 08/13 0659 08/13 0700 - 08/14 0659 08/14 0700 - 08/15 0659    I.V. (mL/kg) 3104.5 (27.5) 282.7 (2.5)     Blood 732      Total Intake(mL/kg) 3836.5 (34) 282.7 (2.5)     Urine (mL/kg/hr) 1890 (0.7) 880 (0.3)     Chest Tube 196 303     Total Output 2086 1183     Net +1750.5 -900.3                  Lines/Drains/Airways     Central Venous Catheter Line                 Percutaneous Central Line Insertion/Assessment - Quad lumen  08/12/19 0745 2 days         Percutaneous Central Line Insertion/Assessment - quad lumen  08/12/19 0745 2 days          Drain                 Urethral Catheter 08/12/19 0736 Straight-tip;Temperature probe;Non-latex 2 days         Chest Tube 08/12/19 1303 3 Left Pleural 19 Fr. 1 day         Y Chest Tube 1 and 2 08/12/19 1302 Right Mediastinal 19 Fr. Left Mediastinal 19 Fr. 1 day          Line                 Pacer Wires 08/12/19 1257 1 day          Peripheral Intravenous Line                 Peripheral IV - Single Lumen 07/10/19 1324 22 G Anterior;Left Forearm 34 days         Peripheral IV - Single Lumen 08/12/19 0600 18 G Right Forearm 2 days                Physical Exam   Constitutional: She is oriented to person, place, and time. She appears well-developed and well-nourished. No distress.   HENT:   Head: Normocephalic and atraumatic.   Eyes: Pupils are  equal, round, and reactive to light. EOM are normal.   Neck: Normal range of motion.   Cardiovascular: Regular rhythm, normal heart sounds and normal pulses.   Tachy  RIJ    Pulmonary/Chest: Effort normal. No respiratory distress.   3L o2   Abdominal: Soft. She exhibits no distension.   Musculoskeletal: Normal range of motion. She exhibits edema.   Neurological: She is alert and oriented to person, place, and time.   Skin: Skin is warm, dry and intact. Capillary refill takes less than 2 seconds. She is not diaphoretic. No erythema. No pallor.   Midline sternal incision c/d/i   Leg incisions with bandage    Psychiatric: She has a normal mood and affect. Her speech is normal and behavior is normal. Judgment and thought content normal.   Vitals reviewed.      Significant Labs:  BMP:   Recent Labs   Lab 08/14/19  0458   *      K 4.8  4.8      CO2 26   BUN 18   CREATININE 1.0   CALCIUM 8.2*   MG 2.2     CBC:   Recent Labs   Lab 08/14/19  0458   WBC 7.58   RBC 2.92*   HGB 8.1*   HCT 26.1*   *   MCV 89   MCH 27.7   MCHC 31.0*       Significant Diagnostics:  I have reviewed all pertinent imaging results/findings within the past 24 hours.    Assessment/Plan:     * S/P CABG (coronary artery bypass graft)  S/P CABG (coronary artery bypass graft)  Patient is a 73yo female s/p CABG x2 with Dr. Ventura on 8/12/19. PMH significnat for CAD, HTN, hx TIA.        - Analgesia: oxycodone PRN  - History of TIA, no acute findings  - 3L NC  - Respiratory treatment q4h  - IS  - MAP goals 60-80  - Will D/C pleural CT and keep Meds   - Monitor UOP with Strict I&Os  - Trend BUN/Cr  - Guevara catheter D/C  - Start Lasix 20 BID IV   - replete electrolytes prn  - DVT: SCDs, heparin subq  - GI: Famotidine BID   - Metop increased to 25 for   - Start Losartan 25 for     Hypophosphatemia  Phos 1.9  Replaced IV    Acute blood loss anemia  - CBC daily  - Hb/Hct stable 8.1/26.1    Type 2 diabetes mellitus with stage 3  chronic kidney disease, without long-term current use of insulin  Endocrine following     CKD (chronic kidney disease) stage 3, GFR 30-59 ml/min  Creatinine stable at 1.0    Morbid obesity with body mass index (BMI) of 40.0 or higher  Cardiac diet   PT/OT        Catherine Baker PA-C  Cardiothoracic Surgery  [facility]

## 2019-08-14 NOTE — CONSULTS
Food & Nutrition  Education    Diet Education: Cardiac  Time Spent: 15 minutes  Learners: Pt    Nutrition Education provided with handouts: Yes    Comments: Provided and discussed handouts on low Na and cardiac TLC diets. Reviewed sodium restriction, foods high in sodium, and encouraged pt to flavor food with herbs/spices/salt-free seasonings instead of salt. Encouraged pt to limit saturated/trans fat intake and consume more omega-3 fats. Reviewed food sources of these fats. Pt voiced understanding.    All questions and concerns answered. Dietitian's contact information provided.     Follow-Up: Yes    Please Re-consult as needed  Thanks!

## 2019-08-14 NOTE — PROGRESS NOTES
08/14/19 1254   Vital Signs   Pulse (!) 112   BP (!) 169/86   MAP (mmHg) 119   BP Location Left arm   BP Method Automatic   Patient Position Sitting   Notified Ginger ALCALA about BP and HR. PA to make adjustments to pt meds. Will continue to monitor pt.

## 2019-08-14 NOTE — PROGRESS NOTES
PT/OT in room with pt, they stated after walking they looked down and saw a CT line lying on floor. CT wasn't tugged on during therapy. Called FREDRICK Chaney. Pt O2=92% on 4L NC, no distress. PA assessed site,  pulled pacing wires, and redressed CT site. 2 CT remain. Will continue to monitor pt.

## 2019-08-14 NOTE — PT/OT/SLP PROGRESS
"Physical Therapy Treatment    Patient Name:  Kaylee Romero   MRN:  [id number]    Recommendations:     Discharge Recommendations:  home health PT   Discharge Equipment Recommendations: none   Barriers to discharge: None    Assessment:     Kaylee Romero is a 74 y.o. female admitted with a medical diagnosis of S/P CABG (coronary artery bypass graft).  She presents with the following impairments/functional limitations:  weakness, impaired endurance, impaired functional mobilty, gait instability, impaired balance, pain, impaired cardiopulmonary response to activity, decreased safety awareness. Pt tolerated activity with improved mobility reflected by increased distance ambulated with decreased assistance required. Pt chest tube fell out at finish of session, RN and PA aware. Pt would continue to benefit from acute skilled therapy intervention to address deficits and progress toward prior level of function.       Rehab Prognosis: Good; patient would benefit from acute skilled PT services to address these deficits and reach maximum level of function.    Recent Surgery: Procedure(s) (LRB):  CORONARY ARTERY BYPASS GRAFT (CABG)X3 (N/A)  SURGICAL PROCUREMENT, VEIN, ENDOSCOPIC (Left) 2 Days Post-Op    Plan:     During this hospitalization, patient to be seen 4 x/week to address the identified rehab impairments via gait training, therapeutic exercises, therapeutic activities, neuromuscular re-education and progress toward the following goals:    · Plan of Care Expires:  09/12/19    Subjective     Chief Complaint: Pt c/o SOB, pain, states "If I don't use my arms, I end up on the floor. I have already fallen at home"   Patient/Family Comments/goals: to get better and return home   Pain/Comfort:  · Pain Rating 1: (pt c/o sternal incisional pain, did not quantify )  · Pain Addressed 1: Cessation of Activity, Distraction      Objective:     Communicated with RN prior to session.  Patient found up in chair with telemetry, chest " tube, peripheral IV, oxygen upon PT entry to room.     General Precautions: Standard, fall, sternal   Orthopedic Precautions:N/A   Braces: N/A     Functional Mobility:  · Transfers:     · Sit to Stand: 2x from chair with contact guard assistance with no AD  · Gait: Pt ambulated 2x 8 feet to bathroom and back with minimum assistance and HHA, then ambulated 80 feet with minimum assistance, progressing to contact guard assistance. Pt with small step size, wide DIVYA, increased lateral sway. Pt with no LOB, no dizziness, reports of mild SOB. Pt took 2 standing rest breaks.       AM-PAC 6 CLICK MOBILITY  Turning over in bed (including adjusting bedclothes, sheets and blankets)?: 3  Sitting down on and standing up from a chair with arms (e.g., wheelchair, bedside commode, etc.): 3  Moving from lying on back to sitting on the side of the bed?: 3  Moving to and from a bed to a chair (including a wheelchair)?: 3  Need to walk in hospital room?: 3  Climbing 3-5 steps with a railing?: 2  Basic Mobility Total Score: 17       Therapeutic Activities and Exercises:   Pt educated on role of PT/POC. Pt verbalized understanding.   Pt stood at sink with stand by assistance with UE resting on sink for stability and performed self care for ~5 mins.   Upon return to sitting in chair following gait training in hallway, PT found patient end of chest tube sitting on floor. RN notified immediately, PA at bedside shortly after. SpO2 difficult to obtain, once obtained, SpO2 at 91%. Pt reported SOB and mild dizziness which resolved.   Pt encouraged to only perform OOB mobility with assistance from nursing/therapy. Pt agreeable.   Pt educated on seated exercises to perform 20x, 3x/day. Exercises included bilateral LAQ, marching, ankle DF/PF  Pt encouraged to ambulate 4x/day with assistance/supervision from nursing/therapy. Pt agreeable.      Patient left up in chair with all lines intact, call button in reach and RN, PA  present..    GOALS:    Multidisciplinary Problems     Physical Therapy Goals        Problem: Physical Therapy Goal    Goal Priority Disciplines Outcome Goal Variances Interventions   Physical Therapy Goal     PT, PT/OT Ongoing (interventions implemented as appropriate)     Description:  Goals to be met by: 2019    Patient will increase functional independence with mobility by performin. Supine <> sit with Contact Guard Assistance.  2. Sit <> stand transfer with Contact Guard Assistance using No Assistive Device.- met 2019  Sit <> stand transfer with supervision using no assistive device- not met   3. Bed <> chair transfer via Stand Pivot with Contact Guard Assistance using No Assistive Device.  4. Gait  x 150 feet with Contact Guard Assistance using No Assistive Device to prepare for community ambulation and endurance activities.  5. Dynamic standing for 8 minutes with Contact Guard Assistance using Rolling Walker to prepare for functional tasks in standing.  6. Able to tolerate exercise for 15-20 reps with independence.  7. Patient is able to recall 3/3 sternal precautions without assistance.                          Time Tracking:     PT Received On: 19  PT Start Time: 1017     PT Stop Time: 1050  PT Total Time (min): 33 min     Billable Minutes: Gait Training 30 mins    Treatment Type: Treatment  PT/PTA: PT           Rachel Cantu, PT  2019

## 2019-08-14 NOTE — PLAN OF CARE
Problem: Occupational Therapy Goal  Goal: Occupational Therapy Goal  Goals to be met by: 8/27/19     Patient will increase functional independence with ADLs by performing:    UE Dressing with [place].  LE Dressing with [place].  Grooming while standing at sink with [place].  Toileting from toilet with [place] for hygiene and clothing management.   Bathing from shower chair with [place].  Toilet transfer to toilet with [place].  Increased functional strength to WFL for B UE.  Upper extremity exercise program x15 reps per handout, with independence.     Outcome: Ongoing (interventions implemented as appropriate)  Continue POC     [place], OTR/L  Pager: [phone]  8/14/2019

## 2019-08-14 NOTE — PLAN OF CARE
Problem: Physical Therapy Goal  Goal: Physical Therapy Goal  Goals to be met by: 2019    Patient will increase functional independence with mobility by performin. Supine <> sit with Contact Guard Assistance.  2. Sit <> stand transfer with Contact Guard Assistance using No Assistive Device.- met 2019  Sit <> stand transfer with supervision using no assistive device- not met   3. Bed <> chair transfer via Stand Pivot with Contact Guard Assistance using No Assistive Device.  4. Gait  x 150 feet with Contact Guard Assistance using No Assistive Device to prepare for community ambulation and endurance activities.  5. Dynamic standing for 8 minutes with Contact Guard Assistance using Rolling Walker to prepare for functional tasks in standing.  6. Able to tolerate exercise for 15-20 reps with independence.  7. Patient is able to recall 3/3 sternal precautions without assistance.        Outcome: Ongoing (interventions implemented as appropriate)  Pt is progressing toward goals. All goals remain appropriate.

## 2019-08-14 NOTE — PT/OT/SLP PROGRESS
Occupational Therapy   Treatment    Name: Kaylee Romero  MRN: [id number]  Admitting Diagnosis:  S/P CABG (coronary artery bypass graft)  2 Days Post-Op    Recommendations:     Discharge Recommendations: home health PT  Discharge Equipment Recommendations:  none  Barriers to discharge:  Inaccessible home environment    Assessment:     Kaylee Romero is a 74 y.o. female with a medical diagnosis of S/P CABG (coronary artery bypass graft).  She presents with performance deficits affecting function are weakness, gait instability, impaired balance, impaired endurance, impaired self care skills, impaired functional mobilty, impaired cardiopulmonary response to activity, decreased safety awareness, pain. Pt tolerated session well. Pt prformed functional mobility with min A requiring HHA, self-care standing at the sink with SBA, and sit <>stand transfers with CGA. Pt would benefit from continued skilled acute OT services in order to maximize independence and safety with ADLs and functional mobility to ensure safe return to PLOF in the least restrictive environment.    Rehab Prognosis:  Good; patient would benefit from acute skilled OT services to address these deficits and reach maximum level of function.       Plan:     Patient to be seen 4 x/week to address the above listed problems via self-care/home management, therapeutic activities, therapeutic exercises  · Plan of Care Expires: 08/27/19  · Plan of Care Reviewed with: patient    Subjective     Pain/Comfort:  · Pain Rating 1: (Pt did not rate; pt reported painat sternal incision )  · Pain Addressed 1: Distraction, Cessation of Activity    Objective:     Communicated with: RN prior to session.  Patient found up in chair with telemetry, chest tube, peripheral IV, oxygen(5L oxygen ) upon OT entry to room. Pt agreeable to therapy session.     General Precautions: Standard, fall, sternal   Orthopedic Precautions:N/A   Braces: N/A     Occupational Performance:     Bed  Mobility:    · DNT, pt found seated UIC and left sitting UIC.     Functional Mobility/Transfers:  · Patient completed x 2 reps Sit <> Stand Transfer from bedside chair with contact guard assistance  with  no assistive device   · Functional Mobility: Pt engaging in functional mobility to simulate household distances approx ~80ft  with min A and utilizing handheld assistance in order to maximize functional activity tolerance and standing balance required for engagement in occupations of choice.   · Pt on 5L of oxygen  · Pt required increased time and slow pacing  · Pt with imbalance but no overt LOB     Activities of Daily Living:  · Grooming: stand by assistance and set-up A   · Pt stood at sink and performed oral care, washed face, and brushed hair  · Pt required increased time for task completion due to slight fatigue and SOB  · Pt educated on maintaining sternal precautions while standing at the sink as pt would rest b/l forearms on sink counter      AMPAC 6 Click ADL: 17    Treatment & Education:  - Pt educated on role of OT, POC, and goals for therapy.    - pt educated on sternal precautions   - Patient and family aware of patient's deficits and therapy progression.   - Educated pt on being appropriate to transfer with nsg and PCT with min A   - Time provided for therapeutic counseling and discussion of health disposition.   - Post ambulation, pt's chest tube was found on the floor and had come out at some point during therapy session. RN notified immediately. CT was not tugged or pulled during therapy session.   - Importance of OOB ax's with staff member assistance and sitting OOB majority of day.   - Pt completed ADLs and functional mobility for treatment session as noted above   - Pt verbalized understanding. Pt expressed no further concerns/questions.  - whiteboard updated     Patient left up in chair with all lines intact, call button in reach and RN notifiedEducation:      GOALS:   Multidisciplinary  Problems     Occupational Therapy Goals        Problem: Occupational Therapy Goal    Goal Priority Disciplines Outcome Interventions   Occupational Therapy Goal     OT, PT/OT Ongoing (interventions implemented as appropriate)    Description:  Goals to be met by: 8/27/19     Patient will increase functional independence with ADLs by performing:    UE Dressing with [place].  LE Dressing with [place].  Grooming while standing at sink with [place].  Toileting from toilet with [place] for hygiene and clothing management.   Bathing from shower chair with [place].  Toilet transfer to toilet with [place].  Increased functional strength to WFL for B UE.  Upper extremity exercise program x15 reps per handout, with independence.                      Time Tracking:     OT Date of Treatment: 08/14/19  OT Start Time: 1017  OT Stop Time: 1050  OT Total Time (min): 33 min    Billable Minutes:Self Care/Home Management 10  Therapeutic Activity 13    B[place]ly River OT  8/14/2019

## 2019-08-14 NOTE — NURSING
Called Ginger ALCALA about potassium of 4.8 as pt has scheduled replacement. PA ordered to hold AM dose. Will continue to monitor.

## 2019-08-14 NOTE — SUBJECTIVE & OBJECTIVE
"Interval HPI:   Overnight events: Transferred to [facility]. BG slightly above goal overnight on IV insulin infusion at 0.5 u/hr; required correction scale x1.    Eatin% - 100%   Nausea: No  Hypoglycemia and intervention: No  Fever: No  TPN and/or TF: No    /71 (BP Location: Left arm, Patient Position: Sitting)   Pulse 107   Temp 98.1 °F (36.7 °C) (Oral)   Resp 20   Ht 5' 2" (1.575 m)   Wt 107.5 kg (236 lb 15.9 oz)   LMP 2001 (Approximate)   SpO2 (!) 90%   Breastfeeding? No   BMI 43.35 kg/m²     Labs Reviewed and Include    Recent Labs   Lab 19  0458   *   CALCIUM 8.2*      K 4.8  4.8   CO2 26      BUN 18   CREATININE 1.0     Lab Results   Component Value Date    WBC 7.58 2019    HGB 8.1 (L) 2019    HCT 26.1 (L) 2019    MCV 89 2019     (L) 2019     No results for input(s): TSH, FREET4 in the last 168 hours.  Lab Results   Component Value Date    HGBA1C 7.5 (H) 2019       Nutritional status:   Body mass index is 43.35 kg/m².  Lab Results   Component Value Date    ALBUMIN 3.3 (L) 2019    ALBUMIN 3.3 (L) 2019    ALBUMIN 3.0 (L) 2019     No results found for: PREALBUMIN    Estimated Creatinine Clearance: 58.7 mL/min (based on SCr of 1 mg/dL).    Accu-Checks  Recent Labs     19  0511 19  0607 19  0719 19  0808 19  1708 19  1711 19  2104 19  2108 19  0111 19  0839   POCTGLUCOSE 161* 159* 169* 170* 245* 221* 176* 197* 159* 169*       Current Medications and/or Treatments Impacting Glycemic Control  Immunotherapy:    Immunosuppressants     None        Steroids:   Hormones (From admission, onward)    None        Pressors:    Autonomic Drugs (From admission, onward)    None        Hyperglycemia/Diabetes Medications:   Antihyperglycemics (From admission, onward)    Start     Stop Route Frequency Ordered    19 0989  insulin regular (Humulin R) 100 Units in " sodium chloride 0.9% 100 mL infusion      -- IV Continuous 08/13/19 0837    08/13/19 0937  insulin aspart U-100 pen 0-4 Units      -- SubQ As needed (PRN) 08/13/19 0837

## 2019-08-14 NOTE — SUBJECTIVE & OBJECTIVE
Interval History: NAEON. Pt will work on ambulating today. Has some discomfort around sternotomy and leg incision sites, which is to be expected post operatively.     Review of Systems   Constitution: Negative for malaise/fatigue.   Cardiovascular: Positive for chest pain and leg swelling. Negative for dyspnea on exertion, irregular heartbeat and palpitations.   Respiratory: Negative for cough and shortness of breath.    Hematologic/Lymphatic: Negative for bleeding problem.   Gastrointestinal: Negative for abdominal pain.   Genitourinary: Negative for dysuria.   Neurological: Positive for weakness. Negative for headaches.     Medications:  Continuous Infusions:   insulin (HUMAN R) infusion (adults) 0.5 Units/hr (08/13/19 1700)     Scheduled Meds:   aspirin  325 mg Oral Daily    atorvastatin  40 mg Oral QHS    docusate sodium  100 mg Oral BID    furosemide  20 mg Intravenous BID    heparin (porcine)  5,000 Units Subcutaneous Q8H    losartan  25 mg Oral Daily    metoprolol tartrate  25 mg Oral BID    mupirocin  1 g Nasal BID    pantoprazole  40 mg Oral Before breakfast    polyethylene glycol  17 g Oral Daily    potassium chloride  20 mEq Oral Q12H    sodium phosphate IVPB  30 mmol Intravenous Once     PRN Meds:acetaminophen, bisacodyl, Dextrose 10% Bolus, Dextrose 10% Bolus, glucagon (human recombinant), glucose, glucose, hydrALAZINE, insulin aspart U-100, metoclopramide HCl, ondansetron, oxyCODONE, oxyCODONE, sodium chloride 0.9%     Objective:     Vital Signs (Most Recent):  Temp: 98.1 °F (36.7 °C) (08/14/19 0424)  Pulse: 105 (08/14/19 0743)  Resp: 18 (08/14/19 0424)  BP: (!) 151/80 (08/14/19 0622)  SpO2: 100 % (08/14/19 0424) Vital Signs (24h Range):  Temp:  [97.5 °F (36.4 °C)-99 °F (37.2 °C)] 98.1 °F (36.7 °C)  Pulse:  [] 105  Resp:  [14-18] 18  SpO2:  [69 %-100 %] 100 %  BP: (102-162)/(50-88) 151/80  Arterial Line BP: ()/(41-73) 109/70     Weight: 107.5 kg (236 lb 15.9 oz)  Body mass  index is 43.35 kg/m².    SpO2: 100 %  O2 Device (Oxygen Therapy): nasal cannula    Intake/Output - Last 3 Shifts       08/12 0700 - 08/13 0659 08/13 0700 - 08/14 0659 08/14 0700 - 08/15 0659    I.V. (mL/kg) 3104.5 (27.5) 282.7 (2.5)     Blood 732      Total Intake(mL/kg) 3836.5 (34) 282.7 (2.5)     Urine (mL/kg/hr) 1890 (0.7) 880 (0.3)     Chest Tube 196 303     Total Output 2086 1183     Net +1750.5 -900.3                  Lines/Drains/Airways     Central Venous Catheter Line                 Percutaneous Central Line Insertion/Assessment - Quad lumen  08/12/19 0745 2 days         Percutaneous Central Line Insertion/Assessment - quad lumen  08/12/19 0745 2 days          Drain                 Urethral Catheter 08/12/19 0736 Straight-tip;Temperature probe;Non-latex 2 days         Chest Tube 08/12/19 1303 3 Left Pleural 19 Fr. 1 day         Y Chest Tube 1 and 2 08/12/19 1302 Right Mediastinal 19 Fr. Left Mediastinal 19 Fr. 1 day          Line                 Pacer Wires 08/12/19 1257 1 day          Peripheral Intravenous Line                 Peripheral IV - Single Lumen 07/10/19 1324 22 G Anterior;Left Forearm 34 days         Peripheral IV - Single Lumen 08/12/19 0600 18 G Right Forearm 2 days                Physical Exam   Constitutional: She is oriented to person, place, and time. She appears well-developed and well-nourished. No distress.   HENT:   Head: Normocephalic and atraumatic.   Eyes: Pupils are equal, round, and reactive to light. EOM are normal.   Neck: Normal range of motion.   Cardiovascular: Regular rhythm, normal heart sounds and normal pulses.   Tachy  RIJ    Pulmonary/Chest: Effort normal. No respiratory distress.   3L o2   Abdominal: Soft. She exhibits no distension.   Musculoskeletal: Normal range of motion. She exhibits edema.   Neurological: She is alert and oriented to person, place, and time.   Skin: Skin is warm, dry and intact. Capillary refill takes less than 2 seconds. She is not  diaphoretic. No erythema. No pallor.   Midline sternal incision c/d/i   Leg incisions with bandage    Psychiatric: She has a normal mood and affect. Her speech is normal and behavior is normal. Judgment and thought content normal.   Vitals reviewed.      Significant Labs:  BMP:   Recent Labs   Lab 08/14/19 0458   *      K 4.8  4.8      CO2 26   BUN 18   CREATININE 1.0   CALCIUM 8.2*   MG 2.2     CBC:   Recent Labs   Lab 08/14/19 0458   WBC 7.58   RBC 2.92*   HGB 8.1*   HCT 26.1*   *   MCV 89   MCH 27.7   MCHC 31.0*       Significant Diagnostics:  I have reviewed all pertinent imaging results/findings within the past 24 hours.

## 2019-08-14 NOTE — PROGRESS NOTES
08/14/19 1456   Vital Signs   Pulse 103   BP (!) 169/82   MAP (mmHg) 118   BP Location Left arm   BP Method Automatic   Patient Position Sitting   Called Ginger ALCALA regarding pt BP unchanged after additional ordered metoprolol and losartan given. Ordered to give PRN hydralazine IVP. Will continue to monitor.

## 2019-08-14 NOTE — PLAN OF CARE
Problem: Adult Inpatient Plan of Care  Goal: Plan of Care Review  8/12: Admit to SICU s/p CABG x 3. Levo on. 500 albumin. Propofol d/c post-op. 1 amp bicarb.  Insulin and cardene started  8/13: levo off.    Outcome: Ongoing (interventions implemented as appropriate)  Patient remained free of falls, injury, trauma, and skin breakdown.  MAPs ranged from ; after scheduled BP medications administration no PRN hydralazine needed.   Blood glucose monitored; PRN insulin not indicated.  Chest tube drainage monitored and documented. Chest tube dressing to be changed in AM.  Sternal and fall precautions maintained. Guevara and midsternal dressing to be removed in AM.  Guevara patency maintained and output documented. Plan of care reviewed; patient verbalized understanding. No patient complaints at this time; will continue to monitor.

## 2019-08-14 NOTE — PROGRESS NOTES
RN contacted MD Bergeron regarding patient's insulin coverage. Patient's EE=[id number].  Patient's insulin infusion dose is not to be increased; additional coverage is provided by insulin aspart pen.  Pt did not meet parameters for PRN insulin; notified MD- no new orders. Will continue to monitor.

## 2019-08-14 NOTE — PROGRESS NOTES
RN removed pt's midsternal dressing; SRINIVASAN.  Chest tube dressing change small amount of sanguineous drainage noted. Pt owens discontinued at 0500; patient due to void by 1100. Patient tolerated well; no complaints at this time. Will continue to monitor.

## 2019-08-14 NOTE — CONSULTS
Cardiac Rehab     Kaylee Romero   [id number]   8/14/2019     Patient was groggy during consult.  Briefly discussed cardiac rehab, but patient was falling in & out of sleep.    Risk Factors-Modifiable: diabetes, hyperlipidemia, hypertension, obesity, sedentary lifestyle    Risk Factors-Non modifiable: age, race    Teaching Method: Verbal, Living with Heart Disease    Comments: S/P CABG. Discussed cardiac rehab and risk factor modification. Team to refer patient to [facility]. Educational materials were left for the patient. Encouraged for patient to contact rehab team with any questions.    Roxaan Avalos RN  Cardiac Rehab Nurse

## 2019-08-15 LAB
ANION GAP SERPL CALC-SCNC: 9 MMOL/L (ref 8–16)
APTT BLDCRRT: 30.5 SEC (ref 21–32)
BASOPHILS # BLD AUTO: 0.02 K/UL (ref 0–0.2)
BASOPHILS NFR BLD: 0.2 % (ref 0–1.9)
BUN SERPL-MCNC: 17 MG/DL (ref 8–23)
CALCIUM SERPL-MCNC: 8.6 MG/DL (ref 8.7–10.5)
CHLORIDE SERPL-SCNC: 100 MMOL/L (ref 95–110)
CO2 SERPL-SCNC: 26 MMOL/L (ref 23–29)
CREAT SERPL-MCNC: 1.1 MG/DL (ref 0.5–1.4)
DIFFERENTIAL METHOD: ABNORMAL
EOSINOPHIL # BLD AUTO: 0.1 K/UL (ref 0–0.5)
EOSINOPHIL NFR BLD: 0.6 % (ref 0–8)
ERYTHROCYTE [DISTWIDTH] IN BLOOD BY AUTOMATED COUNT: 15.1 % (ref 11.5–14.5)
EST. GFR  (AFRICAN AMERICAN): 58 ML/MIN/1.73 M^2
EST. GFR  (NON AFRICAN AMERICAN): 50.3 ML/MIN/1.73 M^2
GLUCOSE SERPL-MCNC: 156 MG/DL (ref 70–110)
HCT VFR BLD AUTO: 26 % (ref 37–48.5)
HGB BLD-MCNC: 8.2 G/DL (ref 12–16)
IMM GRANULOCYTES # BLD AUTO: 0.05 K/UL (ref 0–0.04)
IMM GRANULOCYTES NFR BLD AUTO: 0.5 % (ref 0–0.5)
LYMPHOCYTES # BLD AUTO: 1 K/UL (ref 1–4.8)
LYMPHOCYTES NFR BLD: 10.9 % (ref 18–48)
MAGNESIUM SERPL-MCNC: 1.8 MG/DL (ref 1.6–2.6)
MCH RBC QN AUTO: 27.8 PG (ref 27–31)
MCHC RBC AUTO-ENTMCNC: 31.5 G/DL (ref 32–36)
MCV RBC AUTO: 88 FL (ref 82–98)
MONOCYTES # BLD AUTO: 0.7 K/UL (ref 0.3–1)
MONOCYTES NFR BLD: 7 % (ref 4–15)
NEUTROPHILS # BLD AUTO: 7.5 K/UL (ref 1.8–7.7)
NEUTROPHILS NFR BLD: 80.8 % (ref 38–73)
NRBC BLD-RTO: 0 /100 WBC
PHOSPHATE SERPL-MCNC: 2.1 MG/DL (ref 2.7–4.5)
PLATELET # BLD AUTO: 157 K/UL (ref 150–350)
PMV BLD AUTO: 10.3 FL (ref 9.2–12.9)
POCT GLUCOSE: 127 MG/DL (ref 70–110)
POCT GLUCOSE: 151 MG/DL (ref 70–110)
POCT GLUCOSE: 153 MG/DL (ref 70–110)
POCT GLUCOSE: 163 MG/DL (ref 70–110)
POCT GLUCOSE: 166 MG/DL (ref 70–110)
POTASSIUM SERPL-SCNC: 4.1 MMOL/L (ref 3.5–5.1)
RBC # BLD AUTO: 2.95 M/UL (ref 4–5.4)
SODIUM SERPL-SCNC: 135 MMOL/L (ref 136–145)
WBC # BLD AUTO: 9.33 K/UL (ref 3.9–12.7)

## 2019-08-15 PROCEDURE — 63600175 PHARM REV CODE 636 W HCPCS: Performed by: PHYSICIAN ASSISTANT

## 2019-08-15 PROCEDURE — 84100 ASSAY OF PHOSPHORUS: CPT

## 2019-08-15 PROCEDURE — 63600175 PHARM REV CODE 636 W HCPCS: Performed by: STUDENT IN AN ORGANIZED HEALTH CARE EDUCATION/TRAINING PROGRAM

## 2019-08-15 PROCEDURE — 97530 THERAPEUTIC ACTIVITIES: CPT

## 2019-08-15 PROCEDURE — 25000003 PHARM REV CODE 250: Performed by: PHYSICIAN ASSISTANT

## 2019-08-15 PROCEDURE — 85025 COMPLETE CBC W/AUTO DIFF WBC: CPT

## 2019-08-15 PROCEDURE — 20600001 HC STEP DOWN PRIVATE ROOM

## 2019-08-15 PROCEDURE — 94761 N-INVAS EAR/PLS OXIMETRY MLT: CPT

## 2019-08-15 PROCEDURE — 27000221 HC OXYGEN, UP TO 24 HOURS

## 2019-08-15 PROCEDURE — 99900035 HC TECH TIME PER 15 MIN (STAT)

## 2019-08-15 PROCEDURE — 97535 SELF CARE MNGMENT TRAINING: CPT

## 2019-08-15 PROCEDURE — 83735 ASSAY OF MAGNESIUM: CPT

## 2019-08-15 PROCEDURE — 94799 UNLISTED PULMONARY SVC/PX: CPT

## 2019-08-15 PROCEDURE — 36415 COLL VENOUS BLD VENIPUNCTURE: CPT

## 2019-08-15 PROCEDURE — 85730 THROMBOPLASTIN TIME PARTIAL: CPT

## 2019-08-15 PROCEDURE — 97116 GAIT TRAINING THERAPY: CPT

## 2019-08-15 PROCEDURE — 25000003 PHARM REV CODE 250: Performed by: THORACIC SURGERY (CARDIOTHORACIC VASCULAR SURGERY)

## 2019-08-15 PROCEDURE — 80048 BASIC METABOLIC PNL TOTAL CA: CPT

## 2019-08-15 RX ORDER — LANOLIN ALCOHOL/MO/W.PET/CERES
800 CREAM (GRAM) TOPICAL ONCE
Status: COMPLETED | OUTPATIENT
Start: 2019-08-15 | End: 2019-08-15

## 2019-08-15 RX ORDER — METOPROLOL TARTRATE 25 MG/1
25 TABLET, FILM COATED ORAL ONCE
Status: COMPLETED | OUTPATIENT
Start: 2019-08-15 | End: 2019-08-15

## 2019-08-15 RX ADMIN — POLYETHYLENE GLYCOL 3350 17 G: 17 POWDER, FOR SOLUTION ORAL at 09:08

## 2019-08-15 RX ADMIN — POTASSIUM PHOSPHATE, MONOBASIC AND POTASSIUM PHOSPHATE, DIBASIC 15 MMOL: 224; 236 INJECTION, SOLUTION, CONCENTRATE INTRAVENOUS at 03:08

## 2019-08-15 RX ADMIN — INSULIN ASPART 4 UNITS: 100 INJECTION, SOLUTION INTRAVENOUS; SUBCUTANEOUS at 12:08

## 2019-08-15 RX ADMIN — DOCUSATE SODIUM 100 MG: 100 CAPSULE, LIQUID FILLED ORAL at 08:08

## 2019-08-15 RX ADMIN — MAGNESIUM OXIDE TAB 400 MG (241.3 MG ELEMENTAL MG) 800 MG: 400 (241.3 MG) TAB at 02:08

## 2019-08-15 RX ADMIN — HEPARIN SODIUM 5000 UNITS: 5000 INJECTION, SOLUTION INTRAVENOUS; SUBCUTANEOUS at 05:08

## 2019-08-15 RX ADMIN — INSULIN DETEMIR 8 UNITS: 100 INJECTION, SOLUTION SUBCUTANEOUS at 08:08

## 2019-08-15 RX ADMIN — INSULIN ASPART 4 UNITS: 100 INJECTION, SOLUTION INTRAVENOUS; SUBCUTANEOUS at 08:08

## 2019-08-15 RX ADMIN — DOCUSATE SODIUM 100 MG: 100 CAPSULE, LIQUID FILLED ORAL at 09:08

## 2019-08-15 RX ADMIN — METOPROLOL TARTRATE 25 MG: 25 TABLET ORAL at 12:08

## 2019-08-15 RX ADMIN — ASPIRIN 325 MG: 325 TABLET, DELAYED RELEASE ORAL at 09:08

## 2019-08-15 RX ADMIN — METOPROLOL TARTRATE 50 MG: 50 TABLET ORAL at 09:08

## 2019-08-15 RX ADMIN — POTASSIUM CHLORIDE 20 MEQ: 1500 TABLET, EXTENDED RELEASE ORAL at 08:08

## 2019-08-15 RX ADMIN — ATORVASTATIN CALCIUM 40 MG: 20 TABLET, FILM COATED ORAL at 08:08

## 2019-08-15 RX ADMIN — LOSARTAN POTASSIUM 50 MG: 50 TABLET, FILM COATED ORAL at 09:08

## 2019-08-15 RX ADMIN — POTASSIUM CHLORIDE 20 MEQ: 1500 TABLET, EXTENDED RELEASE ORAL at 09:08

## 2019-08-15 RX ADMIN — FUROSEMIDE 20 MG: 10 INJECTION, SOLUTION INTRAMUSCULAR; INTRAVENOUS at 05:08

## 2019-08-15 RX ADMIN — PANTOPRAZOLE SODIUM 40 MG: 40 TABLET, DELAYED RELEASE ORAL at 05:08

## 2019-08-15 RX ADMIN — HEPARIN SODIUM 5000 UNITS: 5000 INJECTION, SOLUTION INTRAVENOUS; SUBCUTANEOUS at 02:08

## 2019-08-15 RX ADMIN — HEPARIN SODIUM 5000 UNITS: 5000 INJECTION, SOLUTION INTRAVENOUS; SUBCUTANEOUS at 08:08

## 2019-08-15 RX ADMIN — MUPIROCIN 1 G: 20 OINTMENT TOPICAL at 09:08

## 2019-08-15 RX ADMIN — INSULIN ASPART 4 UNITS: 100 INJECTION, SOLUTION INTRAVENOUS; SUBCUTANEOUS at 04:08

## 2019-08-15 RX ADMIN — FUROSEMIDE 20 MG: 10 INJECTION, SOLUTION INTRAMUSCULAR; INTRAVENOUS at 09:08

## 2019-08-15 RX ADMIN — METOPROLOL TARTRATE 75 MG: 50 TABLET ORAL at 08:08

## 2019-08-15 NOTE — PROGRESS NOTES
[facility]  Cardiothoracic Surgery  Progress Note    Patient Name: Kaylee Romero  MRN: [id number]  Admission Date: 8/12/2019  Hospital Length of Stay: 3 days  Code Status: Prior   Attending Physician: Peterson Ventura MD   Referring Provider: Peterson Ventura MD  Principal Problem:S/P CABG (coronary artery bypass graft)            Subjective:     Post-Op Info:  Procedure(s) (LRB):  CORONARY ARTERY BYPASS GRAFT (CABG)X3 (N/A)  SURGICAL PROCUREMENT, VEIN, ENDOSCOPIC (Left)   3 Days Post-Op     Interval History: NAEON. Increase Metop to 75 BID. Was given IV hydralazine yesterday for HTN. Increased Losartan to 50 mg. Slept better last night but still felt tired and weak this morning. Encouraged ambulation.     Review of Systems   Constitution: Positive for malaise/fatigue.   Cardiovascular: Positive for dyspnea on exertion. Negative for chest pain and palpitations.   Respiratory: Positive for shortness of breath. Negative for cough.    Hematologic/Lymphatic: Negative for bleeding problem.   Neurological: Positive for weakness.     Medications:  Continuous Infusions:  Scheduled Meds:   aspirin  325 mg Oral Daily    atorvastatin  40 mg Oral QHS    docusate sodium  100 mg Oral BID    furosemide  20 mg Intravenous BID    heparin (porcine)  5,000 Units Subcutaneous Q8H    insulin aspart U-100  4 Units Subcutaneous TIDWM    insulin detemir U-100  8 Units Subcutaneous QHS    losartan  50 mg Oral Daily    metoprolol tartrate  75 mg Oral BID    mupirocin  1 g Nasal BID    pantoprazole  40 mg Oral Before breakfast    polyethylene glycol  17 g Oral Daily    potassium chloride  20 mEq Oral Q12H     PRN Meds:acetaminophen, bisacodyl, Dextrose 10% Bolus, Dextrose 10% Bolus, glucagon (human recombinant), glucose, glucose, hydrALAZINE, insulin aspart U-100, metoclopramide HCl, ondansetron, oxyCODONE, oxyCODONE, sodium chloride 0.9%     Objective:     Vital Signs (Most Recent):  Temp: 96.4 °F  (35.8 °C) (08/15/19 1159)  Pulse: 97 (08/15/19 1159)  Resp: 16 (08/15/19 1159)  BP: 126/72 (08/15/19 1159)  SpO2: 99 % (08/15/19 1159) Vital Signs (24h Range):  Temp:  [96.4 °F (35.8 °C)-99.4 °F (37.4 °C)] 96.4 °F (35.8 °C)  Pulse:  [] 97  Resp:  [16-20] 16  SpO2:  [95 %-100 %] 99 %  BP: (115-169)/(58-86) 126/72     Weight: 104.7 kg (230 lb 13.2 oz)  Body mass index is 42.22 kg/m².    SpO2: 99 %  O2 Device (Oxygen Therapy): nasal cannula    Intake/Output - Last 3 Shifts       08/13 0700 - 08/14 0659 08/14 0700 - 08/15 0659 08/15 0700 - 08/16 0659    P.O.  480 710    I.V. (mL/kg) 282.7 (2.5) 255.5 (2.4)     Blood       Total Intake(mL/kg) 282.7 (2.5) 735.5 (7) 710 (6.8)    Urine (mL/kg/hr) 880 (0.3) 2650 (1.1)     Chest Tube 303 200 50    Total Output 1183 2850 50    Net -900.3 -2114.5 +660                 Lines/Drains/Airways     Central Venous Catheter Line                 Percutaneous Central Line Insertion/Assessment - Quad lumen  08/12/19 0745 3 days          Drain                 Y Chest Tube 1 and 2 08/12/19 1302 Right Mediastinal 19 Fr. Left Mediastinal 19 Fr. 3 days          Peripheral Intravenous Line                 Peripheral IV - Single Lumen 07/10/19 1324 22 G Anterior;Left Forearm 35 days         Peripheral IV - Single Lumen 08/12/19 0600 18 G Right Forearm 3 days                Physical Exam   Constitutional: She is oriented to person, place, and time. She appears well-developed and well-nourished.   Lethargic    HENT:   Head: Normocephalic and atraumatic.   Eyes: Pupils are equal, round, and reactive to light. EOM are normal.   Neck: Normal range of motion.   Cardiovascular: Regular rhythm and normal pulses.   tachy   Pulmonary/Chest: Effort normal. No respiratory distress.   4L O2    Abdominal: Soft.   Musculoskeletal: Normal range of motion. She exhibits edema.   Neurological: She is alert and oriented to person, place, and time.   Skin: Skin is warm, dry and intact. Capillary refill takes  less than 2 seconds.   Midline sternal incision c/d/i with sutures    Psychiatric: She has a normal mood and affect. Her speech is normal and behavior is normal. Judgment and thought content normal.   Vitals reviewed.      Significant Labs:  BMP:   Recent Labs   Lab 08/15/19  0728   *   *   K 4.1      CO2 26   BUN 17   CREATININE 1.1   CALCIUM 8.6*   MG 1.8     CBC:   Recent Labs   Lab 08/15/19  0728   WBC 9.33   RBC 2.95*   HGB 8.2*   HCT 26.0*      MCV 88   MCH 27.8   MCHC 31.5*       Significant Diagnostics:  I have reviewed all pertinent imaging results/findings within the past 24 hours.    Assessment/Plan:     * S/P CABG (coronary artery bypass graft)  S/P CABG (coronary artery bypass graft)  Patient is a 75yo female s/p CABG x2 with Dr. Ventura on 8/12/19. PMH significnat for CAD, HTN, hx TIA.        - Analgesia: oxycodone PRN  - History of TIA, no acute findings  - 4L NC - wean as tolerated   - Respiratory treatment q4h  - IS  - MAP goals 60-80  - Will D/C pleural CT 8/14  - Pleural CT remain   - Monitor UOP with Strict I&Os  - Trend BUN/Cr  - Guevara catheter D/C  - Start Lasix 20 BID IV   - replete electrolytes prn  - DVT: SCDs, heparin subq  - GI: Famotidine BID   - Metop increased to 75 for HR 100s  - IV hydralazine given yesterday   - Increase losartan to 50    Hypophosphatemia  Phos 2.1  Replaced IV again     Acute blood loss anemia  - CBC daily  - Hb/Hct stable 8.2/26    Type 2 diabetes mellitus with stage 3 chronic kidney disease, without long-term current use of insulin  Endocrine following     CKD (chronic kidney disease) stage 3, GFR 30-59 ml/min  Creatinine stable at 1.1    KAMRAN on CPAP  CPAP at night     Morbid obesity with body mass index (BMI) of 40.0 or higher  Cardiac diet   PT/OT    Dispo: CTSU. Discharge likely weekend     Catherine Baker PA-C  Cardiothoracic Surgery  [facility]

## 2019-08-15 NOTE — PLAN OF CARE
Problem: Occupational Therapy Goal  Goal: Occupational Therapy Goal  Goals to be met by: 8/27/19     Patient will increase functional independence with ADLs by performing:    UE Dressing with Hartley.  LE Dressing with Hartley.  Grooming while standing at sink with Hartley.  Toileting from toilet with Hartley for hygiene and clothing management.   Bathing from shower chair with Hartley.  Toilet transfer to toilet with Hartley.  Increased functional strength to WFL for B UE.  Upper extremity exercise program x15 reps per handout, with independence.     Outcome: Ongoing (interventions implemented as appropriate)  Continue POC     [place], OTR/L  Pager: [phone]  8/15/2019

## 2019-08-15 NOTE — PLAN OF CARE
"Problem: Adult Inpatient Plan of Care  Goal: Patient-Specific Goal (Individualization)  Outcome: Ongoing (interventions implemented as appropriate)  Plan of care discussed with patient.  Patient ambulating with 2 assist, fall precautions in place.Continuing to encourage sternal precautions, IS, and ambulation. Pt only ambulated x1 with therapy, as she stated "I'm too tired, I didn't sleep at all last night". Patient has no complaints of pain. RIJ CVL D/C per order. graciela removed this AM-pt voided without complication. 1 CT D/C today, 2 remain. Discussed medications and care. Patient has no questions at this time. Will continue to monitor.             "

## 2019-08-15 NOTE — NURSING
PA asked to be notified of HR an hour after med administration. Notified Ginger ALCALA about pt HR remaining around . Will continue to monitor pt.

## 2019-08-15 NOTE — PLAN OF CARE
Problem: Adult Inpatient Plan of Care  Goal: Plan of Care Review  8/12: Admit to SICU s/p CABG x 3. Levo on. 500 albumin. Propofol d/c post-op. 1 amp bicarb.  Insulin and cardene started  8/13: levo off.    Outcome: Ongoing (interventions implemented as appropriate)  Pt remained free of falls, trauma, injury. Pt AOx4. VSS. Midsternal incision sutures intact, graft site dressings CDI. Mediastinal tube intact to suction. R IJ removed with dressing CDI. Discontinued insulin drip and started detemir per MD orders. Pt on CPAP for sleep. Nursing communication in place to minimize interruptions from 11pm-5am. Reviewed plan of care with pt and family; answered all questions. Pt tolerating plan of care. Will continue to monitor.

## 2019-08-15 NOTE — PT/OT/SLP PROGRESS
"Physical Therapy Treatment    Patient Name:  Kaylee Romero   MRN:  [id number]    Recommendations:     Discharge Recommendations:  home with home health   Discharge Equipment Recommendations: none   Barriers to discharge: None    Assessment:     Kaylee Romero is a 74 y.o. female admitted with a medical diagnosis of S/P CABG (coronary artery bypass graft).  She presents with the following impairments/functional limitations:  weakness, impaired endurance, impaired functional mobilty, gait instability, impaired balance, impaired cardiopulmonary response to activity, pain. Pt tolerated activity with improved mobility reflected by increased distance ambulated with decreased assistance required. Pt with reported fatigue limiting distance tolerated. Pt would continue to benefit from acute skilled therapy intervention to address deficits and progress toward prior level of function.       Rehab Prognosis: Good; patient would benefit from acute skilled PT services to address these deficits and reach maximum level of function.    Recent Surgery: Procedure(s) (LRB):  CORONARY ARTERY BYPASS GRAFT (CABG)X3 (N/A)  SURGICAL PROCUREMENT, VEIN, ENDOSCOPIC (Left) 3 Days Post-Op    Plan:     During this hospitalization, patient to be seen 4 x/week to address the identified rehab impairments via gait training, therapeutic activities, therapeutic exercises, neuromuscular re-education and progress toward the following goals:    · Plan of Care Expires:  09/12/19    Subjective     Chief Complaint: Pt c/o pain, states "I am too tired, I can't go very far right now. I wish you had come an hour later"  Patient/Family Comments/goals: to get better and return home   Pain/Comfort:  · Pain Rating 1: 5/10  · Location - Orientation 1: midline  · Location 1: sternal  · Pain Addressed 1: Nurse notified, Cessation of Activity      Objective:     Communicated with RN prior to session.  Patient found up in chair with telemetry, oxygen, chest tube " upon PT entry to room.     General Precautions: Standard, fall, sternal   Orthopedic Precautions:N/A   Braces: N/A     Functional Mobility:  · Transfers:  Sit to Stand:  contact guard assistance with no AD. Pt with multiple failed attempts to stand without assistance.   · Gait: Pt ambulated 90 feet with no AD and standby assistance. Pt with wide DIVYA with increased lateral sway. Pt with no LOB, no SOB, no dizziness. Pt terminated gait training 2/2 fatigue.       AM-PAC 6 CLICK MOBILITY  Turning over in bed (including adjusting bedclothes, sheets and blankets)?: 3  Sitting down on and standing up from a chair with arms (e.g., wheelchair, bedside commode, etc.): 3  Moving from lying on back to sitting on the side of the bed?: 3  Moving to and from a bed to a chair (including a wheelchair)?: 4  Need to walk in hospital room?: 4  Climbing 3-5 steps with a railing?: 2  Basic Mobility Total Score: 19       Therapeutic Activities and Exercises:   Pt educated on role of PT/POC. Pt verbalized understanding.   Pt encouraged to only perform OOB mobility with assistance from nursing/therapy. Pt agreeable.   Pt encouraged to ambulate 4x/day with assistance/supervision from nursing/therapy. Pt agreeable.      Patient left up in chair with all lines intact, call button in reach and RN  notified..    GOALS:   Multidisciplinary Problems     Physical Therapy Goals        Problem: Physical Therapy Goal    Goal Priority Disciplines Outcome Goal Variances Interventions   Physical Therapy Goal     PT, PT/OT Ongoing (interventions implemented as appropriate)     Description:  Goals to be met by: 2019    Patient will increase functional independence with mobility by performin. Supine <> sit with Contact Guard Assistance.  2. Sit <> stand transfer with Contact Guard Assistance using No Assistive Device.- met 2019  Sit <> stand transfer with supervision using no assistive device- not met   3. Bed <> chair transfer via Stand  Pivot with Contact Guard Assistance using No Assistive Device.  4. Gait  x 150 feet with Contact Guard Assistance using No Assistive Device to prepare for community ambulation and endurance activities.  5. Dynamic standing for 8 minutes with Contact Guard Assistance using Rolling Walker to prepare for functional tasks in standing.  6. Able to tolerate exercise for 15-20 reps with independence.  7. Patient is able to recall 3/3 sternal precautions without assistance.                          Time Tracking:     PT Received On: 08/15/19  PT Start Time: 1400     PT Stop Time: 1415  PT Total Time (min): 15 min     Billable Minutes: Gait Training 15 mins     Treatment Type: Treatment  PT/PTA: PT           Rachel Cantu, PT  08/15/2019

## 2019-08-15 NOTE — PLAN OF CARE
08/15/19 1300   Discharge Assessment   Assessment Type Discharge Planning Reassessment   Assessment information obtained from? Patient;Caregiver   Discharge Plan A Home with family;Home Health   Discharge Plan B Home with family   DME Needed Upon Discharge  other (see comments)  (TBD- may require platform attachment for rolling walker pending PT/OT recs for DME)   Patient/Family in Agreement with Plan yes   Readmission Questionnaire   At the time of your discharge, did someone talk to you about what your health problems were? Yes   At the time of discharge, did someone talk to you about what to watch out for regarding worsening of your health problem? Yes   At the time of discharge, did someone talk to you about which medication to take when you left the hospital and which ones to stop taking? Yes   At the time of discharge, did someone talk to you about when and where to follow up with a doctor after you left the hospital? Yes   What do you believe caused you to be sick enough to be re-admitted? planned surgery   How often do you need to have someone help you when you read instructions, pamphlets, or other written material from your doctor or pharmacy? Never   Do you have problems taking your medications as prescribed? Yes   Do you have any problems affording any of  your prescribed medications? No   Do you have problems obtaining/receiving your medications? No   Does the patient have transportation to healthcare appointments? Yes   Living Arrangements house   Does the patient have family/friends to help with healtcare needs after discharge? yes   Does your caregiver provide all the help you need? Yes   Are you currently feeling confused? No   Are you currently having problems thinking? No   Are you currently having memory problems? No   Have you felt down, depressed, or hopeless? 0   Have you felt little interest or pleasure in doing things? 0   In the last 7 days, my sleep quality was: poor     Met with  patient and family member yesterday afternoon. The patient was recently stepdown from ICU and had poor sleep in the last 24 hours. She was very lethargic upon assessment. Reviewed discharge planning with family member. Encouraged ambulation and review sternal precautions and the importance of IS use. PT/OT recs at this time are home w/HH services. The patient has PHN and a referral will have to be directly sent to [facility] for assignment of an agency. The patient is interested in the meals program for after inpatient hospitalization that [facility] offers her plan. Will coordinate with N enrollment in the program prior to discharge.  contact information placed on white board. Will continue to follow and help with discharge planning throughout admission.

## 2019-08-15 NOTE — SUBJECTIVE & OBJECTIVE
Interval History: NAEON. Increase Metop to 75 BID. Was given IV hydralazine yesterday for HTN. Increased Losartan to 50 mg. Slept better last night but still felt tired and weak this morning. Encouraged ambulation.     Review of Systems   Constitution: Positive for malaise/fatigue.   Cardiovascular: Positive for dyspnea on exertion. Negative for chest pain and palpitations.   Respiratory: Positive for shortness of breath. Negative for cough.    Hematologic/Lymphatic: Negative for bleeding problem.   Neurological: Positive for weakness.     Medications:  Continuous Infusions:  Scheduled Meds:   aspirin  325 mg Oral Daily    atorvastatin  40 mg Oral QHS    docusate sodium  100 mg Oral BID    furosemide  20 mg Intravenous BID    heparin (porcine)  5,000 Units Subcutaneous Q8H    insulin aspart U-100  4 Units Subcutaneous TIDWM    insulin detemir U-100  8 Units Subcutaneous QHS    losartan  50 mg Oral Daily    metoprolol tartrate  75 mg Oral BID    mupirocin  1 g Nasal BID    pantoprazole  40 mg Oral Before breakfast    polyethylene glycol  17 g Oral Daily    potassium chloride  20 mEq Oral Q12H     PRN Meds:acetaminophen, bisacodyl, Dextrose 10% Bolus, Dextrose 10% Bolus, glucagon (human recombinant), glucose, glucose, hydrALAZINE, insulin aspart U-100, metoclopramide HCl, ondansetron, oxyCODONE, oxyCODONE, sodium chloride 0.9%     Objective:     Vital Signs (Most Recent):  Temp: 96.4 °F (35.8 °C) (08/15/19 1159)  Pulse: 97 (08/15/19 1159)  Resp: 16 (08/15/19 1159)  BP: 126/72 (08/15/19 1159)  SpO2: 99 % (08/15/19 1159) Vital Signs (24h Range):  Temp:  [96.4 °F (35.8 °C)-99.4 °F (37.4 °C)] 96.4 °F (35.8 °C)  Pulse:  [] 97  Resp:  [16-20] 16  SpO2:  [95 %-100 %] 99 %  BP: (115-169)/(58-86) 126/72     Weight: 104.7 kg (230 lb 13.2 oz)  Body mass index is 42.22 kg/m².    SpO2: 99 %  O2 Device (Oxygen Therapy): nasal cannula    Intake/Output - Last 3 Shifts       08/13 0700 - 08/14 0659 08/14 0700 -  08/15 0659 08/15 0700 - 08/16 0659    P.O.  480 710    I.V. (mL/kg) 282.7 (2.5) 255.5 (2.4)     Blood       Total Intake(mL/kg) 282.7 (2.5) 735.5 (7) 710 (6.8)    Urine (mL/kg/hr) 880 (0.3) 2650 (1.1)     Chest Tube 303 200 50    Total Output 1183 2850 50    Net -900.3 -2114.5 +660                 Lines/Drains/Airways     Central Venous Catheter Line                 Percutaneous Central Line Insertion/Assessment - Quad lumen  08/12/19 0745 3 days          Drain                 Y Chest Tube 1 and 2 08/12/19 1302 Right Mediastinal 19 Fr. Left Mediastinal 19 Fr. 3 days          Peripheral Intravenous Line                 Peripheral IV - Single Lumen 07/10/19 1324 22 G Anterior;Left Forearm 35 days         Peripheral IV - Single Lumen 08/12/19 0600 18 G Right Forearm 3 days                Physical Exam   Constitutional: She is oriented to person, place, and time. She appears well-developed and well-nourished.   Lethargic    HENT:   Head: Normocephalic and atraumatic.   Eyes: Pupils are equal, round, and reactive to light. EOM are normal.   Neck: Normal range of motion.   Cardiovascular: Regular rhythm and normal pulses.   tachy   Pulmonary/Chest: Effort normal. No respiratory distress.   4L O2    Abdominal: Soft.   Musculoskeletal: Normal range of motion. She exhibits edema.   Neurological: She is alert and oriented to person, place, and time.   Skin: Skin is warm, dry and intact. Capillary refill takes less than 2 seconds.   Midline sternal incision c/d/i with sutures    Psychiatric: She has a normal mood and affect. Her speech is normal and behavior is normal. Judgment and thought content normal.   Vitals reviewed.      Significant Labs:  BMP:   Recent Labs   Lab 08/15/19  0728   *   *   K 4.1      CO2 26   BUN 17   CREATININE 1.1   CALCIUM 8.6*   MG 1.8     CBC:   Recent Labs   Lab 08/15/19  0728   WBC 9.33   RBC 2.95*   HGB 8.2*   HCT 26.0*      MCV 88   MCH 27.8   MCHC 31.5*        Significant Diagnostics:  I have reviewed all pertinent imaging results/findings within the past 24 hours.

## 2019-08-15 NOTE — PT/OT/SLP PROGRESS
Occupational Therapy   Treatment    Name: Kaylee Romero  MRN: [id number]  Admitting Diagnosis:  S/P CABG (coronary artery bypass graft)  3 Days Post-Op    Recommendations:     Discharge Recommendations: home with home health  Discharge Equipment Recommendations:  none  Barriers to discharge:  Inaccessible home environment    Assessment:     Kaylee Romero is a 74 y.o. female with a medical diagnosis of S/P CABG (coronary artery bypass graft).  She presents with performance deficits affecting function are weakness, impaired self care skills, impaired functional mobilty, impaired endurance, gait instability, impaired balance, impaired cardiopulmonary response to activity. Pt tolerated session well and demonstrated imporved functional mobility, transfers, and ADL skills this date. Pt would benefit from continued skilled acute OT services in order to maximize independence and safety with ADLs and functional mobility to ensure safe return to OF in the least restrictive environment.    Rehab Prognosis:  Good; patient would benefit from acute skilled OT services to address these deficits and reach maximum level of function.       Plan:     Patient to be seen 4 x/week to address the above listed problems via self-care/home management, therapeutic activities, therapeutic exercises  · Plan of Care Expires: 08/27/19  · Plan of Care Reviewed with: patient    Subjective     Pain/Comfort:  · Pain Rating 1: (pt reported right sided chest discomfort post mobility )  · Pain Addressed 1: Nurse notified    Objective:     Communicated with: RN prior to session.  Patient found up in chair with telemetry, oxygen, chest tube upon OT entry to room. Pt agreeable to therapy session     General Precautions: Standard, fall, sternal   Orthopedic Precautions:N/A   Braces: N/A     Occupational Performance:     Bed Mobility:    · Not performed, pt found seated [facility]     Functional Mobility/Transfers:  · Patient completed Sit <> Stand Transfer  from bedside chair with contact guard assistance  with  no assistive device   · Patient completed Toilet Transfer with functional ambulation with  Step transfer technique with stand by assistance with  no AD  · Functional Mobility: Pt engaging in functional mobility to simulate household distances approx 180ft  with CGA using no AD  in order to maximize functional activity tolerance and standing balance required for engagement in occupations of choice.   · Pt required ~4-5 standing rest breaks   · Pt with no LOB but slight instability noted.   · No chair to follow and no seated rest breaks   · Pt on 2L oxygen   · Pt required increased time and standing rest breaks     Activities of Daily Living:  · Grooming: stand by assistance for hand hygiene standing at the sink   · Upper Body Dressing: minimum assistance to don gown like robe while seated [facility]   · Toileting: supervision for clothing management and hygiene     [facility] 6 Click ADL: 21    Treatment & Education:  - Pt verbalized 3/3 sternal precautions with education provided on the importance of maintaining precautions with bed mobility and functional transfers from various surfaces. Pt verbalized understanding and demonstrated understanding by adhering to precautions with use of cardiac pillow throughout session.   - Pt educated on safety with mobility and transfers in the home environment   - pt educated on energy conservation techniques.   - whiteboard updated.     Patient left up in chair with all lines intact, call button in reach and RN notifiedEducation:      GOALS:   Multidisciplinary Problems     Occupational Therapy Goals        Problem: Occupational Therapy Goal    Goal Priority Disciplines Outcome Interventions   Occupational Therapy Goal     OT, PT/OT Ongoing (interventions implemented as appropriate)    Description:  Goals to be met by: 8/27/19     Patient will increase functional independence with ADLs by performing:    UE Dressing with  Leon.  LE Dressing with Leon.  Grooming while standing at sink with Leon.  Toileting from toilet with Leon for hygiene and clothing management.   Bathing from shower chair with Leon.  Toilet transfer to toilet with Leon.  Increased functional strength to WFL for B UE.  Upper extremity exercise program x15 reps per handout, with independence.                      Time Tracking:     OT Date of Treatment: 08/15/19  OT Start Time: 1108  OT Stop Time: 1134  OT Total Time (min): 26 min    Billable Minutes:Self Care/Home Management 10  Therapeutic Activity 13    [place], OT  8/15/2019

## 2019-08-15 NOTE — CARE UPDATE
BG goal 140-180. BG trending down overnight; insulin gtt discontinued and started on Levemir.       Continue Levemir 8 units HS. Continue Novolog 4 units with meals.   BG monitoring ac/hs and low dose correction scale.       ** Please call Endocrine for any BG related issues **

## 2019-08-16 LAB
ANION GAP SERPL CALC-SCNC: 9 MMOL/L (ref 8–16)
APTT BLDCRRT: 28 SEC (ref 21–32)
BASOPHILS # BLD AUTO: 0.02 K/UL (ref 0–0.2)
BASOPHILS NFR BLD: 0.2 % (ref 0–1.9)
BLD PROD TYP BPU: NORMAL
BLD PROD TYP BPU: NORMAL
BLOOD UNIT EXPIRATION DATE: NORMAL
BLOOD UNIT EXPIRATION DATE: NORMAL
BLOOD UNIT TYPE CODE: 9500
BLOOD UNIT TYPE CODE: 9500
BLOOD UNIT TYPE: NORMAL
BLOOD UNIT TYPE: NORMAL
BUN SERPL-MCNC: 22 MG/DL (ref 8–23)
CALCIUM SERPL-MCNC: 8.7 MG/DL (ref 8.7–10.5)
CHLORIDE SERPL-SCNC: 99 MMOL/L (ref 95–110)
CO2 SERPL-SCNC: 27 MMOL/L (ref 23–29)
CODING SYSTEM: NORMAL
CODING SYSTEM: NORMAL
CREAT SERPL-MCNC: 1.2 MG/DL (ref 0.5–1.4)
DIFFERENTIAL METHOD: ABNORMAL
DISPENSE STATUS: NORMAL
DISPENSE STATUS: NORMAL
EOSINOPHIL # BLD AUTO: 0.2 K/UL (ref 0–0.5)
EOSINOPHIL NFR BLD: 1.8 % (ref 0–8)
ERYTHROCYTE [DISTWIDTH] IN BLOOD BY AUTOMATED COUNT: 14.7 % (ref 11.5–14.5)
EST. GFR  (AFRICAN AMERICAN): 52.2 ML/MIN/1.73 M^2
EST. GFR  (NON AFRICAN AMERICAN): 45.3 ML/MIN/1.73 M^2
GLUCOSE SERPL-MCNC: 206 MG/DL (ref 70–110)
HCT VFR BLD AUTO: 27.2 % (ref 37–48.5)
HGB BLD-MCNC: 8.5 G/DL (ref 12–16)
IMM GRANULOCYTES # BLD AUTO: 0.06 K/UL (ref 0–0.04)
IMM GRANULOCYTES NFR BLD AUTO: 0.7 % (ref 0–0.5)
LYMPHOCYTES # BLD AUTO: 1.4 K/UL (ref 1–4.8)
LYMPHOCYTES NFR BLD: 17.6 % (ref 18–48)
MAGNESIUM SERPL-MCNC: 1.9 MG/DL (ref 1.6–2.6)
MCH RBC QN AUTO: 28 PG (ref 27–31)
MCHC RBC AUTO-ENTMCNC: 31.3 G/DL (ref 32–36)
MCV RBC AUTO: 90 FL (ref 82–98)
MONOCYTES # BLD AUTO: 0.6 K/UL (ref 0.3–1)
MONOCYTES NFR BLD: 7 % (ref 4–15)
NEUTROPHILS # BLD AUTO: 5.9 K/UL (ref 1.8–7.7)
NEUTROPHILS NFR BLD: 72.7 % (ref 38–73)
NRBC BLD-RTO: 0 /100 WBC
PHOSPHATE SERPL-MCNC: 2.3 MG/DL (ref 2.7–4.5)
PLATELET # BLD AUTO: 186 K/UL (ref 150–350)
PMV BLD AUTO: 10.1 FL (ref 9.2–12.9)
POCT GLUCOSE: 185 MG/DL (ref 70–110)
POCT GLUCOSE: 218 MG/DL (ref 70–110)
POCT GLUCOSE: 243 MG/DL (ref 70–110)
POCT GLUCOSE: 245 MG/DL (ref 70–110)
POTASSIUM SERPL-SCNC: 4.6 MMOL/L (ref 3.5–5.1)
RBC # BLD AUTO: 3.04 M/UL (ref 4–5.4)
SODIUM SERPL-SCNC: 135 MMOL/L (ref 136–145)
TRANS ERYTHROCYTES VOL PATIENT: NORMAL ML
TRANS ERYTHROCYTES VOL PATIENT: NORMAL ML
WBC # BLD AUTO: 8.17 K/UL (ref 3.9–12.7)

## 2019-08-16 PROCEDURE — 99900035 HC TECH TIME PER 15 MIN (STAT)

## 2019-08-16 PROCEDURE — 93010 EKG 12-LEAD: ICD-10-PCS | Mod: ,,, | Performed by: INTERNAL MEDICINE

## 2019-08-16 PROCEDURE — 63600175 PHARM REV CODE 636 W HCPCS: Performed by: PHYSICIAN ASSISTANT

## 2019-08-16 PROCEDURE — 20600001 HC STEP DOWN PRIVATE ROOM

## 2019-08-16 PROCEDURE — 25000003 PHARM REV CODE 250: Performed by: STUDENT IN AN ORGANIZED HEALTH CARE EDUCATION/TRAINING PROGRAM

## 2019-08-16 PROCEDURE — 83735 ASSAY OF MAGNESIUM: CPT

## 2019-08-16 PROCEDURE — 36415 COLL VENOUS BLD VENIPUNCTURE: CPT

## 2019-08-16 PROCEDURE — 85025 COMPLETE CBC W/AUTO DIFF WBC: CPT

## 2019-08-16 PROCEDURE — 93005 ELECTROCARDIOGRAM TRACING: CPT

## 2019-08-16 PROCEDURE — 84100 ASSAY OF PHOSPHORUS: CPT

## 2019-08-16 PROCEDURE — 93010 ELECTROCARDIOGRAM REPORT: CPT | Mod: ,,, | Performed by: INTERNAL MEDICINE

## 2019-08-16 PROCEDURE — 85730 THROMBOPLASTIN TIME PARTIAL: CPT

## 2019-08-16 PROCEDURE — 25000003 PHARM REV CODE 250: Performed by: THORACIC SURGERY (CARDIOTHORACIC VASCULAR SURGERY)

## 2019-08-16 PROCEDURE — 63600175 PHARM REV CODE 636 W HCPCS: Performed by: NURSE PRACTITIONER

## 2019-08-16 PROCEDURE — 94660 CPAP INITIATION&MGMT: CPT

## 2019-08-16 PROCEDURE — 94761 N-INVAS EAR/PLS OXIMETRY MLT: CPT

## 2019-08-16 PROCEDURE — 63600175 PHARM REV CODE 636 W HCPCS: Performed by: STUDENT IN AN ORGANIZED HEALTH CARE EDUCATION/TRAINING PROGRAM

## 2019-08-16 PROCEDURE — 80048 BASIC METABOLIC PNL TOTAL CA: CPT

## 2019-08-16 PROCEDURE — 25000003 PHARM REV CODE 250: Performed by: PHYSICIAN ASSISTANT

## 2019-08-16 PROCEDURE — 27000221 HC OXYGEN, UP TO 24 HOURS

## 2019-08-16 PROCEDURE — 94799 UNLISTED PULMONARY SVC/PX: CPT

## 2019-08-16 PROCEDURE — S5571 INSULIN DISPOS PEN 3 ML: HCPCS | Performed by: NURSE PRACTITIONER

## 2019-08-16 RX ORDER — LANOLIN ALCOHOL/MO/W.PET/CERES
400 CREAM (GRAM) TOPICAL ONCE
Status: COMPLETED | OUTPATIENT
Start: 2019-08-16 | End: 2019-08-16

## 2019-08-16 RX ORDER — IBUPROFEN 200 MG
24 TABLET ORAL
Status: DISCONTINUED | OUTPATIENT
Start: 2019-08-16 | End: 2019-08-21 | Stop reason: HOSPADM

## 2019-08-16 RX ORDER — POTASSIUM CHLORIDE 20 MEQ/1
20 TABLET, EXTENDED RELEASE ORAL 2 TIMES DAILY
Status: DISCONTINUED | OUTPATIENT
Start: 2019-08-17 | End: 2019-08-21

## 2019-08-16 RX ORDER — METOPROLOL TARTRATE 1 MG/ML
5 INJECTION, SOLUTION INTRAVENOUS EVERY 5 MIN PRN
Status: DISCONTINUED | OUTPATIENT
Start: 2019-08-16 | End: 2019-08-21 | Stop reason: HOSPADM

## 2019-08-16 RX ORDER — INSULIN ASPART 100 [IU]/ML
4 INJECTION, SOLUTION INTRAVENOUS; SUBCUTANEOUS
Status: DISCONTINUED | OUTPATIENT
Start: 2019-08-16 | End: 2019-08-17

## 2019-08-16 RX ORDER — INSULIN ASPART 100 [IU]/ML
0-5 INJECTION, SOLUTION INTRAVENOUS; SUBCUTANEOUS
Status: DISCONTINUED | OUTPATIENT
Start: 2019-08-16 | End: 2019-08-21 | Stop reason: HOSPADM

## 2019-08-16 RX ORDER — GLUCAGON 1 MG
1 KIT INJECTION
Status: DISCONTINUED | OUTPATIENT
Start: 2019-08-16 | End: 2019-08-21 | Stop reason: HOSPADM

## 2019-08-16 RX ORDER — AMIODARONE HYDROCHLORIDE 200 MG/1
400 TABLET ORAL 2 TIMES DAILY
Status: DISCONTINUED | OUTPATIENT
Start: 2019-08-16 | End: 2019-08-21 | Stop reason: HOSPADM

## 2019-08-16 RX ORDER — AMIODARONE HYDROCHLORIDE 200 MG/1
200 TABLET ORAL DAILY
Status: DISCONTINUED | OUTPATIENT
Start: 2019-08-16 | End: 2019-08-16

## 2019-08-16 RX ORDER — IBUPROFEN 200 MG
16 TABLET ORAL
Status: DISCONTINUED | OUTPATIENT
Start: 2019-08-16 | End: 2019-08-21 | Stop reason: HOSPADM

## 2019-08-16 RX ADMIN — MAGNESIUM OXIDE TAB 400 MG (241.3 MG ELEMENTAL MG) 400 MG: 400 (241.3 MG) TAB at 09:08

## 2019-08-16 RX ADMIN — AMIODARONE HYDROCHLORIDE 400 MG: 200 TABLET ORAL at 10:08

## 2019-08-16 RX ADMIN — INSULIN ASPART 4 UNITS: 100 INJECTION, SOLUTION INTRAVENOUS; SUBCUTANEOUS at 04:08

## 2019-08-16 RX ADMIN — INSULIN ASPART 2 UNITS: 100 INJECTION, SOLUTION INTRAVENOUS; SUBCUTANEOUS at 12:08

## 2019-08-16 RX ADMIN — INSULIN DETEMIR 10 UNITS: 100 INJECTION, SOLUTION SUBCUTANEOUS at 09:08

## 2019-08-16 RX ADMIN — AMIODARONE HYDROCHLORIDE 400 MG: 200 TABLET ORAL at 09:08

## 2019-08-16 RX ADMIN — INSULIN ASPART 1 UNITS: 100 INJECTION, SOLUTION INTRAVENOUS; SUBCUTANEOUS at 09:08

## 2019-08-16 RX ADMIN — METOPROLOL TARTRATE 75 MG: 50 TABLET ORAL at 09:08

## 2019-08-16 RX ADMIN — ACETAMINOPHEN 1000 MG: 500 TABLET ORAL at 09:08

## 2019-08-16 RX ADMIN — PANTOPRAZOLE SODIUM 40 MG: 40 TABLET, DELAYED RELEASE ORAL at 06:08

## 2019-08-16 RX ADMIN — MUPIROCIN 1 G: 20 OINTMENT TOPICAL at 09:08

## 2019-08-16 RX ADMIN — OXYCODONE HYDROCHLORIDE 5 MG: 5 TABLET ORAL at 09:08

## 2019-08-16 RX ADMIN — HEPARIN SODIUM 5000 UNITS: 5000 INJECTION, SOLUTION INTRAVENOUS; SUBCUTANEOUS at 02:08

## 2019-08-16 RX ADMIN — INSULIN ASPART 4 UNITS: 100 INJECTION, SOLUTION INTRAVENOUS; SUBCUTANEOUS at 12:08

## 2019-08-16 RX ADMIN — INSULIN ASPART 4 UNITS: 100 INJECTION, SOLUTION INTRAVENOUS; SUBCUTANEOUS at 08:08

## 2019-08-16 RX ADMIN — HEPARIN SODIUM 5000 UNITS: 5000 INJECTION, SOLUTION INTRAVENOUS; SUBCUTANEOUS at 09:08

## 2019-08-16 RX ADMIN — DIBASIC SODIUM PHOSPHATE, MONOBASIC POTASSIUM PHOSPHATE AND MONOBASIC SODIUM PHOSPHATE 1 TABLET: 852; 155; 130 TABLET ORAL at 09:08

## 2019-08-16 RX ADMIN — ATORVASTATIN CALCIUM 40 MG: 20 TABLET, FILM COATED ORAL at 09:08

## 2019-08-16 RX ADMIN — HEPARIN SODIUM 5000 UNITS: 5000 INJECTION, SOLUTION INTRAVENOUS; SUBCUTANEOUS at 06:08

## 2019-08-16 RX ADMIN — FUROSEMIDE 20 MG: 10 INJECTION, SOLUTION INTRAMUSCULAR; INTRAVENOUS at 06:08

## 2019-08-16 RX ADMIN — LOSARTAN POTASSIUM 50 MG: 50 TABLET, FILM COATED ORAL at 09:08

## 2019-08-16 RX ADMIN — ASPIRIN 325 MG: 325 TABLET, DELAYED RELEASE ORAL at 09:08

## 2019-08-16 RX ADMIN — METOPROLOL TARTRATE 75 MG: 50 TABLET ORAL at 02:08

## 2019-08-16 NOTE — SUBJECTIVE & OBJECTIVE
Interval History: NAEON. Pt appeared to be in afib this morning on monitor. Awaiting IV access from PICC team so PO amiodarone was started and 5mg IV metop were given. Patient sitting in chair and appears lethargic. States she walked 'a little' yesterday and is just realizing the enormity of her operation. Has not taken any pain medication and reported 10/10 chest pain today, reproducible on exam. Patient finally agreed to take 5mg oxy and acetaminophen. Wean O2 as tolerated. Stressed importance of increasing ambulation and using IS.      Review of Systems   Constitution: Positive for malaise/fatigue.   Cardiovascular: Positive for chest pain and dyspnea on exertion.   Respiratory: Positive for shortness of breath.    Neurological: Positive for weakness.     Medications:  Continuous Infusions:  Scheduled Meds:   amiodarone  400 mg Oral BID    aspirin  325 mg Oral Daily    atorvastatin  40 mg Oral QHS    docusate sodium  100 mg Oral BID    furosemide  20 mg Intravenous BID    heparin (porcine)  5,000 Units Subcutaneous Q8H    insulin aspart U-100  4 Units Subcutaneous TIDWM    insulin detemir U-100  10 Units Subcutaneous QHS    losartan  50 mg Oral Daily    metoprolol tartrate  75 mg Oral TID    mupirocin  1 g Nasal BID    pantoprazole  40 mg Oral Before breakfast    polyethylene glycol  17 g Oral Daily    [START ON 8/17/2019] potassium chloride  20 mEq Oral BID     PRN Meds:acetaminophen, bisacodyl, Dextrose 10% Bolus, Dextrose 10% Bolus, glucagon (human recombinant), glucose, glucose, hydrALAZINE, insulin aspart U-100, metoclopramide HCl, metoprolol, ondansetron, oxyCODONE, oxyCODONE, sodium chloride 0.9%     Objective:     Vital Signs (Most Recent):  Temp: 98.3 °F (36.8 °C) (08/16/19 0736)  Pulse: (!) 130 (08/16/19 0925)  Resp: 19 (08/16/19 0925)  BP: 134/77 (08/16/19 0925)  SpO2: 97 % (08/16/19 0925) Vital Signs (24h Range):  Temp:  [96.4 °F (35.8 °C)-99.4 °F (37.4 °C)] 98.3 °F (36.8 °C)  Pulse:   [] 130  Resp:  [16-19] 19  SpO2:  [97 %-100 %] 97 %  BP: ()/(59-77) 134/77     Weight: 104.5 kg (230 lb 6.1 oz)  Body mass index is 42.14 kg/m².    SpO2: 97 %  O2 Device (Oxygen Therapy): nasal cannula    Intake/Output - Last 3 Shifts       08/14 0700 - 08/15 0659 08/15 0700 - 08/16 0659 08/16 0700 - 08/17 0659    P.O. 480 710     I.V. (mL/kg) 255.5 (2.4)      Total Intake(mL/kg) 735.5 (7) 710 (6.8)     Urine (mL/kg/hr) 2650 (1.1) 1000 (0.4)     Chest Tube 200 170     Total Output 2850 1170     Net -2114.5 -460            Stool Occurrence   1 x          Lines/Drains/Airways     Central Venous Catheter Line                 Percutaneous Central Line Insertion/Assessment - Quad lumen  08/12/19 0745 4 days          Drain                 Y Chest Tube 1 and 2 08/12/19 1302 Right Mediastinal 19 Fr. Left Mediastinal 19 Fr. 3 days          Peripheral Intravenous Line                 Peripheral IV - Single Lumen 07/10/19 1324 22 G Anterior;Left Forearm 36 days         Peripheral IV - Single Lumen 08/16/19 0955 20 G;1 3/4 in Left Upper Arm less than 1 day                Physical Exam   Constitutional: She is oriented to person, place, and time. She appears well-developed and well-nourished. No distress.   HENT:   Head: Normocephalic and atraumatic.   Eyes: Pupils are equal, round, and reactive to light. EOM are normal.   Neck: Normal range of motion.   Cardiovascular: Regular rhythm, normal heart sounds and normal pulses.   Afib   Pulmonary/Chest: Effort normal. No respiratory distress.   2L NC   Abdominal: Soft.   Musculoskeletal: Normal range of motion.   Neurological: She is alert and oriented to person, place, and time.   Skin: Skin is warm, dry and intact. She is not diaphoretic.   Midline sternal incision c/d/i    Psychiatric: She has a normal mood and affect. Her speech is normal and behavior is normal. Judgment and thought content normal.   Vitals reviewed.      Significant Labs:  BMP:   Recent Labs   Lab  08/16/19  0610   *   *   K 4.6   CL 99   CO2 27   BUN 22   CREATININE 1.2   CALCIUM 8.7   MG 1.9     CBC:   Recent Labs   Lab 08/16/19  0610   WBC 8.17   RBC 3.04*   HGB 8.5*   HCT 27.2*      MCV 90   MCH 28.0   MCHC 31.3*       Significant Diagnostics:  I have reviewed all pertinent imaging results/findings within the past 24 hours.

## 2019-08-16 NOTE — PLAN OF CARE
Problem: Adult Inpatient Plan of Care  Goal: Patient-Specific Goal (Individualization)  Outcome: Ongoing (interventions implemented as appropriate)  Plan of care discussed with patient.  Patient ambulating with 1 assist, fall precautions in place. Pt only willing to walk once r/t being worn out from events of the morning. Ambulated 200 feet with 1 assist. Continuing to encourage sternal precautions, IS, and ambulation. Patient has no complaints of pain. Discussed medications and care.  Pt went into Afib with RVR, resolved with PO lopressor and PO amiodarone. Pt has converted back to NSR. Patient has no questions at this time. Will continue to monitor.

## 2019-08-16 NOTE — PLAN OF CARE
08/16/19 1638   Post-Acute Status   Post-Acute Authorization Home Health/Hospice   Home Health/Hospice Status Referrals Sent     SW sent a HH referral to [facility] to request that they arrange HH for her and for the post hospitalization meal plan.  They will call the SW back with the agency and the agency will f/u with the pt.    Mar [place], KRISTA [phone]

## 2019-08-16 NOTE — PLAN OF CARE
Problem: Adult Inpatient Plan of Care  Goal: Patient-Specific Goal (Individualization)  Outcome: Ongoing (interventions implemented as appropriate)  Plan of care discussed with patient.  Patient ambulating with assist x 1, fall precautions in place.Continuing to encourage sternal precautions, IS, and ambulation. Patient has no complaints of pain. Discussed medications and care. Patient has no questions at this time. Will continue to monitor.

## 2019-08-16 NOTE — PROGRESS NOTES
"   08/16/19 0925   Vital Signs   Pulse (!) 130   Heart Rate Source Monitor   Resp 19   SpO2 97 %   Flow (L/min) 2   O2 Device (Oxygen Therapy) nasal cannula   /77   MAP (mmHg) 99   BP Location Left arm   BP Method Automatic   Patient Position Lying     Pt called RN to room stating "it feels like when I was having a heart attack, my chest hurts and feels like my Left  arm is numb". VSS. Pt in afib. Pain reproducible. Pt requesting nitro. Notified FREDRICK Chaney. PA to come talk to pt. Pt hasn't wanted to take pain medication in days. PA ordered PRN IVP metoprolol as soon as pt gets IV access and STAT EKG. PA explained that blockages were fixed in surgery and the pain is related to afib and not having pain medication and that numbness is common postop due to the positions that they are in during surgery. Will continue to monitor pt.  "

## 2019-08-16 NOTE — PROGRESS NOTES
[facility]  Cardiothoracic Surgery  Progress Note    Patient Name: Kaylee Romero  MRN: [id number]  Admission Date: 8/12/2019  Hospital Length of Stay: 4 days  Code Status: Prior   Attending Physician: Peterson Ventura MD   Referring Provider: Peterson Ventura MD  Principal Problem:S/P CABG (coronary artery bypass graft)            Subjective:     Post-Op Info:  Procedure(s) (LRB):  CORONARY ARTERY BYPASS GRAFT (CABG)X3 (N/A)  SURGICAL PROCUREMENT, VEIN, ENDOSCOPIC (Left)   4 Days Post-Op     Interval History: NAEON. Pt appeared to be in afib this morning on monitor. Awaiting IV access from PICC team so PO amiodarone was started and 5mg IV metop were given. Patient sitting in chair and appears lethargic. States she walked 'a little' yesterday and is just realizing the enormity of her operation. Has not taken any pain medication and reported 10/10 chest pain today, reproducible on exam. Patient finally agreed to take 5mg oxy and acetaminophen. Wean O2 as tolerated. Stressed importance of increasing ambulation and using IS.      Review of Systems   Constitution: Positive for malaise/fatigue.   Cardiovascular: Positive for chest pain and dyspnea on exertion.   Respiratory: Positive for shortness of breath.    Neurological: Positive for weakness.     Medications:  Continuous Infusions:  Scheduled Meds:   amiodarone  400 mg Oral BID    aspirin  325 mg Oral Daily    atorvastatin  40 mg Oral QHS    docusate sodium  100 mg Oral BID    furosemide  20 mg Intravenous BID    heparin (porcine)  5,000 Units Subcutaneous Q8H    insulin aspart U-100  4 Units Subcutaneous TIDWM    insulin detemir U-100  10 Units Subcutaneous QHS    losartan  50 mg Oral Daily    metoprolol tartrate  75 mg Oral TID    mupirocin  1 g Nasal BID    pantoprazole  40 mg Oral Before breakfast    polyethylene glycol  17 g Oral Daily    [START ON 8/17/2019] potassium chloride  20 mEq Oral BID     PRN  Meds:acetaminophen, bisacodyl, Dextrose 10% Bolus, Dextrose 10% Bolus, glucagon (human recombinant), glucose, glucose, hydrALAZINE, insulin aspart U-100, metoclopramide HCl, metoprolol, ondansetron, oxyCODONE, oxyCODONE, sodium chloride 0.9%     Objective:     Vital Signs (Most Recent):  Temp: 98.3 °F (36.8 °C) (08/16/19 0736)  Pulse: (!) 130 (08/16/19 0925)  Resp: 19 (08/16/19 0925)  BP: 134/77 (08/16/19 0925)  SpO2: 97 % (08/16/19 0925) Vital Signs (24h Range):  Temp:  [96.4 °F (35.8 °C)-99.4 °F (37.4 °C)] 98.3 °F (36.8 °C)  Pulse:  [] 130  Resp:  [16-19] 19  SpO2:  [97 %-100 %] 97 %  BP: ()/(59-77) 134/77     Weight: 104.5 kg (230 lb 6.1 oz)  Body mass index is 42.14 kg/m².    SpO2: 97 %  O2 Device (Oxygen Therapy): nasal cannula    Intake/Output - Last 3 Shifts       08/14 0700 - 08/15 0659 08/15 0700 - 08/16 0659 08/16 0700 - 08/17 0659    P.O. 480 710     I.V. (mL/kg) 255.5 (2.4)      Total Intake(mL/kg) 735.5 (7) 710 (6.8)     Urine (mL/kg/hr) 2650 (1.1) 1000 (0.4)     Chest Tube 200 170     Total Output 2850 1170     Net -2114.5 -460            Stool Occurrence   1 x          Lines/Drains/Airways     Central Venous Catheter Line                 Percutaneous Central Line Insertion/Assessment - Quad lumen  08/12/19 0745 4 days          Drain                 Y Chest Tube 1 and 2 08/12/19 1302 Right Mediastinal 19 Fr. Left Mediastinal 19 Fr. 3 days          Peripheral Intravenous Line                 Peripheral IV - Single Lumen 07/10/19 1324 22 G Anterior;Left Forearm 36 days         Peripheral IV - Single Lumen 08/16/19 0955 20 G;1 3/4 in Left Upper Arm less than 1 day                Physical Exam   Constitutional: She is oriented to person, place, and time. She appears well-developed and well-nourished. No distress.   HENT:   Head: Normocephalic and atraumatic.   Eyes: Pupils are equal, round, and reactive to light. EOM are normal.   Neck: Normal range of motion.   Cardiovascular: Regular rhythm,  normal heart sounds and normal pulses.   Afib   Pulmonary/Chest: Effort normal. No respiratory distress.   2L NC   Abdominal: Soft.   Musculoskeletal: Normal range of motion.   Neurological: She is alert and oriented to person, place, and time.   Skin: Skin is warm, dry and intact. She is not diaphoretic.   Midline sternal incision c/d/i    Psychiatric: She has a normal mood and affect. Her speech is normal and behavior is normal. Judgment and thought content normal.   Vitals reviewed.      Significant Labs:  BMP:   Recent Labs   Lab 08/16/19  0610   *   *   K 4.6   CL 99   CO2 27   BUN 22   CREATININE 1.2   CALCIUM 8.7   MG 1.9     CBC:   Recent Labs   Lab 08/16/19  0610   WBC 8.17   RBC 3.04*   HGB 8.5*   HCT 27.2*      MCV 90   MCH 28.0   MCHC 31.3*       Significant Diagnostics:  I have reviewed all pertinent imaging results/findings within the past 24 hours.    Assessment/Plan:     * S/P CABG (coronary artery bypass graft)  S/P CABG (coronary artery bypass graft)  Patient is a 75yo female s/p CABG x2 with Dr. Ventura on 8/12/19. PMH significnat for CAD, HTN, hx TIA.        - Analgesia: oxycodone PRN  - History of TIA, no acute findings  - 2L NC - wean as tolerated   - Respiratory treatment q4h  - IS  - MAP goals 60-80  - Will D/C pleural CT 8/14  - Pleural CT remain 170cc in 24 hours   - Monitor UOP with Strict I&Os  - Trend BUN/Cr  - Guevara catheter D/C  - Lasix 20 BID IV   - replete electrolytes prn  - DVT: SCDs, heparin subq  - GI: Famotidine BID   - Metop increased to 75 BID  - IV hydralazine and Metop PRN   - Losartan 50  - Start PO Amiodarone for afib on monitor     Hypophosphatemia  Phos 2.3  K phos       Acute blood loss anemia  - CBC daily  - Hb/Hct stable 8.5/27.2    Type 2 diabetes mellitus with stage 3 chronic kidney disease, without long-term current use of insulin  Endocrine following     CKD (chronic kidney disease) stage 3, GFR 30-59 ml/min  Creatinine 1.2 from 1.1  Daily labs      KAMRAN on CPAP  CPAP at night     Morbid obesity with body mass index (BMI) of 40.0 or higher  Cardiac diet   PT/OT    Dispo: CTSU. Discharge weekend/Monday     Catherine Baker PA-C  Cardiothoracic Surgery  [facility]

## 2019-08-16 NOTE — PLAN OF CARE
Problem: Adult Inpatient Plan of Care  Goal: Patient-Specific Goal (Individualization)  Outcome: Ongoing (interventions implemented as appropriate)  Patient remains free from falls and injuries through out shift. Patient AAO and VSS. Patient denies chest pain and SOB. Patient wear 2L of NC with O2 sats of 99%. Patient currently has home CPAP in place. Patient is blood glucose checks ACHS, last  on shift. Schedule Levemir 8units given on shift. Education reinforced on sternal precautions and using call bell prior to ambulating. Patient verbalize understanding. Patient receives IVP Lasix 20mg BID. Potassium phosphate IVPB and PO potassium and mag replacement given. Will f/u with patient's morning labs. Pt has do not disturb orders in place from 11p-5am. MSI is SRINIVASAN with sutures and derma bond. Area is CDI. Meds CT in place with serousangeous output.LL leg has gauze and tergaderm over graft site wit scant amount of dried drainage. POC is for patient to discharge home by the weekend. Plan of care reviewed with patient. Patient verbalizes understanding of plan.  Will continue to monitor.

## 2019-08-16 NOTE — CARE UPDATE
BG goal 140 - 180. BG at goal overnight; FBG above goal this AM on labs and AM BG check.    increase levemir 10 units HS.   BG monitoring ac/hs and low dose correction scale coverage.   Continue novolog 4 units with meals.     ** Please call Endocrine for any BG related issues **

## 2019-08-16 NOTE — NURSING
Patient lost PIV access. BEAU Olmedo charge nurse attempted to obtain PIV and missed. Patient stated that she did not want to be stuck again unless by PICC team. Patient education on importance of having IV access in the event of something were to happen and need IV medication and for schedule IVP lasix this morning.PICC consult place. MD Aleksandr notified. Nursing communication in place. Will cont to monitor patient.

## 2019-08-17 LAB
ANION GAP SERPL CALC-SCNC: 8 MMOL/L (ref 8–16)
APTT BLDCRRT: 30 SEC (ref 21–32)
BASOPHILS # BLD AUTO: 0.03 K/UL (ref 0–0.2)
BASOPHILS NFR BLD: 0.3 % (ref 0–1.9)
BUN SERPL-MCNC: 23 MG/DL (ref 8–23)
CALCIUM SERPL-MCNC: 8.6 MG/DL (ref 8.7–10.5)
CHLORIDE SERPL-SCNC: 97 MMOL/L (ref 95–110)
CO2 SERPL-SCNC: 30 MMOL/L (ref 23–29)
CREAT SERPL-MCNC: 1.2 MG/DL (ref 0.5–1.4)
DIFFERENTIAL METHOD: ABNORMAL
EOSINOPHIL # BLD AUTO: 0.2 K/UL (ref 0–0.5)
EOSINOPHIL NFR BLD: 2.6 % (ref 0–8)
ERYTHROCYTE [DISTWIDTH] IN BLOOD BY AUTOMATED COUNT: 14.6 % (ref 11.5–14.5)
EST. GFR  (AFRICAN AMERICAN): 52.2 ML/MIN/1.73 M^2
EST. GFR  (NON AFRICAN AMERICAN): 45.3 ML/MIN/1.73 M^2
GLUCOSE SERPL-MCNC: 151 MG/DL (ref 70–110)
HCT VFR BLD AUTO: 28.3 % (ref 37–48.5)
HGB BLD-MCNC: 8.7 G/DL (ref 12–16)
IMM GRANULOCYTES # BLD AUTO: 0.08 K/UL (ref 0–0.04)
IMM GRANULOCYTES NFR BLD AUTO: 0.9 % (ref 0–0.5)
LYMPHOCYTES # BLD AUTO: 1.5 K/UL (ref 1–4.8)
LYMPHOCYTES NFR BLD: 17.2 % (ref 18–48)
MAGNESIUM SERPL-MCNC: 1.8 MG/DL (ref 1.6–2.6)
MCH RBC QN AUTO: 27.6 PG (ref 27–31)
MCHC RBC AUTO-ENTMCNC: 30.7 G/DL (ref 32–36)
MCV RBC AUTO: 90 FL (ref 82–98)
MONOCYTES # BLD AUTO: 0.7 K/UL (ref 0.3–1)
MONOCYTES NFR BLD: 7.9 % (ref 4–15)
NEUTROPHILS # BLD AUTO: 6.1 K/UL (ref 1.8–7.7)
NEUTROPHILS NFR BLD: 71.1 % (ref 38–73)
NRBC BLD-RTO: 0 /100 WBC
PHOSPHATE SERPL-MCNC: 2.8 MG/DL (ref 2.7–4.5)
PLATELET # BLD AUTO: 234 K/UL (ref 150–350)
PMV BLD AUTO: 9.9 FL (ref 9.2–12.9)
POCT GLUCOSE: 155 MG/DL (ref 70–110)
POCT GLUCOSE: 229 MG/DL (ref 70–110)
POCT GLUCOSE: 237 MG/DL (ref 70–110)
POCT GLUCOSE: 273 MG/DL (ref 70–110)
POTASSIUM SERPL-SCNC: 3.7 MMOL/L (ref 3.5–5.1)
RBC # BLD AUTO: 3.15 M/UL (ref 4–5.4)
SODIUM SERPL-SCNC: 135 MMOL/L (ref 136–145)
WBC # BLD AUTO: 8.61 K/UL (ref 3.9–12.7)

## 2019-08-17 PROCEDURE — 99900035 HC TECH TIME PER 15 MIN (STAT)

## 2019-08-17 PROCEDURE — 99232 PR SUBSEQUENT HOSPITAL CARE,LEVL II: ICD-10-PCS | Mod: ,,, | Performed by: NURSE PRACTITIONER

## 2019-08-17 PROCEDURE — 94660 CPAP INITIATION&MGMT: CPT

## 2019-08-17 PROCEDURE — 20600001 HC STEP DOWN PRIVATE ROOM

## 2019-08-17 PROCEDURE — 99232 SBSQ HOSP IP/OBS MODERATE 35: CPT | Mod: ,,, | Performed by: NURSE PRACTITIONER

## 2019-08-17 PROCEDURE — 25000003 PHARM REV CODE 250: Performed by: THORACIC SURGERY (CARDIOTHORACIC VASCULAR SURGERY)

## 2019-08-17 PROCEDURE — 85025 COMPLETE CBC W/AUTO DIFF WBC: CPT

## 2019-08-17 PROCEDURE — 25000003 PHARM REV CODE 250: Performed by: PHYSICIAN ASSISTANT

## 2019-08-17 PROCEDURE — 83735 ASSAY OF MAGNESIUM: CPT

## 2019-08-17 PROCEDURE — 80048 BASIC METABOLIC PNL TOTAL CA: CPT

## 2019-08-17 PROCEDURE — 63600175 PHARM REV CODE 636 W HCPCS: Performed by: STUDENT IN AN ORGANIZED HEALTH CARE EDUCATION/TRAINING PROGRAM

## 2019-08-17 PROCEDURE — 84100 ASSAY OF PHOSPHORUS: CPT

## 2019-08-17 PROCEDURE — 27000221 HC OXYGEN, UP TO 24 HOURS

## 2019-08-17 PROCEDURE — 36415 COLL VENOUS BLD VENIPUNCTURE: CPT

## 2019-08-17 PROCEDURE — 85730 THROMBOPLASTIN TIME PARTIAL: CPT

## 2019-08-17 PROCEDURE — 63600175 PHARM REV CODE 636 W HCPCS: Performed by: PHYSICIAN ASSISTANT

## 2019-08-17 PROCEDURE — 25000003 PHARM REV CODE 250: Performed by: STUDENT IN AN ORGANIZED HEALTH CARE EDUCATION/TRAINING PROGRAM

## 2019-08-17 PROCEDURE — 94761 N-INVAS EAR/PLS OXIMETRY MLT: CPT

## 2019-08-17 RX ORDER — INSULIN ASPART 100 [IU]/ML
5 INJECTION, SOLUTION INTRAVENOUS; SUBCUTANEOUS
Status: DISCONTINUED | OUTPATIENT
Start: 2019-08-17 | End: 2019-08-18

## 2019-08-17 RX ADMIN — METOPROLOL TARTRATE 75 MG: 50 TABLET ORAL at 09:08

## 2019-08-17 RX ADMIN — ACETAMINOPHEN 1000 MG: 500 TABLET ORAL at 05:08

## 2019-08-17 RX ADMIN — INSULIN DETEMIR 10 UNITS: 100 INJECTION, SOLUTION SUBCUTANEOUS at 09:08

## 2019-08-17 RX ADMIN — FUROSEMIDE 20 MG: 10 INJECTION, SOLUTION INTRAMUSCULAR; INTRAVENOUS at 09:08

## 2019-08-17 RX ADMIN — INSULIN ASPART 5 UNITS: 100 INJECTION, SOLUTION INTRAVENOUS; SUBCUTANEOUS at 04:08

## 2019-08-17 RX ADMIN — METOPROLOL TARTRATE 75 MG: 50 TABLET ORAL at 03:08

## 2019-08-17 RX ADMIN — OXYCODONE HYDROCHLORIDE 5 MG: 5 TABLET ORAL at 07:08

## 2019-08-17 RX ADMIN — HEPARIN SODIUM 5000 UNITS: 5000 INJECTION, SOLUTION INTRAVENOUS; SUBCUTANEOUS at 09:08

## 2019-08-17 RX ADMIN — POTASSIUM CHLORIDE 20 MEQ: 20 TABLET, EXTENDED RELEASE ORAL at 09:08

## 2019-08-17 RX ADMIN — MUPIROCIN 1 G: 20 OINTMENT TOPICAL at 10:08

## 2019-08-17 RX ADMIN — INSULIN ASPART 5 UNITS: 100 INJECTION, SOLUTION INTRAVENOUS; SUBCUTANEOUS at 12:08

## 2019-08-17 RX ADMIN — HEPARIN SODIUM 5000 UNITS: 5000 INJECTION, SOLUTION INTRAVENOUS; SUBCUTANEOUS at 03:08

## 2019-08-17 RX ADMIN — LOSARTAN POTASSIUM 50 MG: 50 TABLET, FILM COATED ORAL at 09:08

## 2019-08-17 RX ADMIN — HEPARIN SODIUM 5000 UNITS: 5000 INJECTION, SOLUTION INTRAVENOUS; SUBCUTANEOUS at 05:08

## 2019-08-17 RX ADMIN — INSULIN ASPART 2 UNITS: 100 INJECTION, SOLUTION INTRAVENOUS; SUBCUTANEOUS at 12:08

## 2019-08-17 RX ADMIN — ATORVASTATIN CALCIUM 40 MG: 20 TABLET, FILM COATED ORAL at 09:08

## 2019-08-17 RX ADMIN — PANTOPRAZOLE SODIUM 40 MG: 40 TABLET, DELAYED RELEASE ORAL at 05:08

## 2019-08-17 RX ADMIN — ASPIRIN 325 MG: 325 TABLET, DELAYED RELEASE ORAL at 09:08

## 2019-08-17 RX ADMIN — INSULIN ASPART 4 UNITS: 100 INJECTION, SOLUTION INTRAVENOUS; SUBCUTANEOUS at 07:08

## 2019-08-17 RX ADMIN — FUROSEMIDE 20 MG: 10 INJECTION, SOLUTION INTRAMUSCULAR; INTRAVENOUS at 05:08

## 2019-08-17 RX ADMIN — AMIODARONE HYDROCHLORIDE 400 MG: 200 TABLET ORAL at 09:08

## 2019-08-17 RX ADMIN — INSULIN ASPART 3 UNITS: 100 INJECTION, SOLUTION INTRAVENOUS; SUBCUTANEOUS at 04:08

## 2019-08-17 RX ADMIN — INSULIN ASPART 1 UNITS: 100 INJECTION, SOLUTION INTRAVENOUS; SUBCUTANEOUS at 09:08

## 2019-08-17 RX ADMIN — ACETAMINOPHEN 1000 MG: 500 TABLET ORAL at 03:08

## 2019-08-17 NOTE — PROGRESS NOTES
"[facility]  Endocrinology  Progress Note    Admit Date: 2019     Reason for Consult: Management of T2DM, Hyperglycemia     Surgical Procedure and Date: CABG 19    Diabetes diagnosis year:     Lab Results   Component Value Date    HGBA1C 7.5 (H) 2019       Home Diabetes Medications: Levemir 8 q HS, Glipizide 20 mg daily, Trulicity 1.5 mg SQ once weekly    How often checking glucose at home?  Once daily   BG readings on regimen: 100s  Hypoglycemia on the regimen?  no  Missed doses on regimen? no    Diabetes Complications include:     none  Complicating diabetes co morbidities:   History of MI, KAMRAN and CKD      HPI:   Patient is a 74 y.o. female with a diagnosis of DM2, KAMRAN, CKD, and CAD, who is s/p CABG on 19.  Patient has a complex diabetic history, on triple therapy.  Last seen in [facility] by MAURICIO Pritchard NP on 19.  Positive family history of DM (father).  Endocrinology consulted for DM/BG management.            Interval HPI:   Overnight events: Remains in CTSU. NAEON. BG slightly above goal with  scheduled insulin and correction scale. FBG improved with increased basal dose.    Eatin%  Nausea: No  Hypoglycemia and intervention: No  Fever: No  TPN and/or TF: No    BP (!) 144/69 (BP Location: Right arm, Patient Position: Sitting)   Pulse 85   Temp 98.1 °F (36.7 °C) (Oral)   Resp 18   Ht 5' 2" (1.575 m)   Wt 104.5 kg (230 lb 6.1 oz)   LMP 2001 (Approximate)   SpO2 96%   Breastfeeding? No   BMI 42.14 kg/m²      Labs Reviewed and Include    Recent Labs   Lab 19  0506   *   CALCIUM 8.6*   *   K 3.7   CO2 30*   CL 97   BUN 23   CREATININE 1.2     Lab Results   Component Value Date    WBC 8.61 2019    HGB 8.7 (L) 2019    HCT 28.3 (L) 2019    MCV 90 2019     2019     No results for input(s): TSH, FREET4 in the last 168 hours.  Lab Results   Component Value Date    HGBA1C 7.5 (H) " 08/07/2019       Nutritional status:   Body mass index is 42.14 kg/m².  Lab Results   Component Value Date    ALBUMIN 3.3 (L) 08/07/2019    ALBUMIN 3.3 (L) 08/01/2019    ALBUMIN 3.0 (L) 07/09/2019     No results found for: PREALBUMIN    Estimated Creatinine Clearance: 48.1 mL/min (based on SCr of 1.2 mg/dL).    Accu-Checks  Recent Labs     08/15/19  0209 08/15/19  0824 08/15/19  1243 08/15/19  1633 08/15/19  2047 08/16/19  0833 08/16/19  1212 08/16/19  1633 08/16/19  2127 08/17/19  0751   POCTGLUCOSE 127* 166* 153* 163* 151* 218* 245* 185* 243* 155*       Current Medications and/or Treatments Impacting Glycemic Control  Immunotherapy:    Immunosuppressants     None        Steroids:   Hormones (From admission, onward)    None        Pressors:    Autonomic Drugs (From admission, onward)    None        Hyperglycemia/Diabetes Medications:   Antihyperglycemics (From admission, onward)    Start     Stop Route Frequency Ordered    08/16/19 2100  insulin detemir U-100 pen 10 Units      -- SubQ Nightly 08/16/19 1009    08/16/19 0908  insulin aspart U-100 pen 0-5 Units      -- SubQ Before meals & nightly PRN 08/16/19 0809    08/16/19 0815  insulin aspart U-100 pen 4 Units      -- SubQ 3 times daily with meals 08/16/19 0809    08/13/19 0945  insulin regular (Humulin R) 100 Units in sodium chloride 0.9% 100 mL infusion      08/14 2100 IV Continuous 08/13/19 0837          ASSESSMENT and PLAN    * S/P CABG (coronary artery bypass graft)  S/p CABG on 8/12/19  Managed per primary team  Avoid hypoglycemia  Optimize BG control for surgical wound healing        Type 2 diabetes mellitus with stage 3 chronic kidney disease, without long-term current use of insulin  BG goal 140 - 180     increase levemir 10 units HS.   BG monitoring ac/hs and low dose correction scale coverage.   increase novolog 5 units with meals.           Discharge planning: TBD        CKD (chronic kidney disease) stage 3, GFR 30-59 ml/min  Titrate insulin slowly to  avoid hypoglycemia as the risk of hypoglycemia increases with decreased creatinine clearance.  Caution with insulin stacking  Estimated Creatinine Clearance: 58.7 mL/min (based on SCr of 1 mg/dL).        Morbid obesity with body mass index (BMI) of 40.0 or higher  may contribute to insulin resistance  Body mass index is 43.35 kg/m².            Rcahel Guerrreo NP  Endocrinology  [facility]

## 2019-08-17 NOTE — PLAN OF CARE
Problem: Adult Inpatient Plan of Care  Goal: Plan of Care Review  8/12: Admit to SICU s/p CABG x 3. Levo on. 500 albumin. Propofol d/c post-op. 1 amp bicarb.  Insulin and cardene started  8/13: levo off.    Outcome: Ongoing (interventions implemented as appropriate)  A A O x 4. Free of falls, traumas and injuries. Skin intact. Denies shortness of breath, chest pain, nausea, vomiting. GABG x2 post op day 5. MSI SRINIVASAN. Mediastinal chest tube in place. BG monitored ACHS. Patient on 2L NC. Plan of care reviewed with patient. Vital signs stable. Pain monitored. Will continue to monitor.

## 2019-08-17 NOTE — PLAN OF CARE
Problem: Adult Inpatient Plan of Care  Goal: Plan of Care Review  8/12: Admit to SICU s/p CABG x 3. Levo on. 500 albumin. Propofol d/c post-op. 1 amp bicarb.  Insulin and cardene started  8/13: levo off.    Outcome: Ongoing (interventions implemented as appropriate)  Pt free of fall, injuries, and trauma. Mid-sternal incision CDI, open to air. Chest tube producing minimal output, dressing CDI. Pt remains in NSR. Pt educated on home glucose control. Pt using home CPAP machine to sleep. Reviewed plan of care with pt. Pt verbalized understanding. Pt is AAO X 3. VSS. NADN. Will continue to monitor.

## 2019-08-17 NOTE — SUBJECTIVE & OBJECTIVE
"Interval HPI:   Overnight events: Remains in CTSU. NAEON. BG at or slightly abvoe goal with scheduled insulin and correction scale coverage.   Eatin%  Nausea: No  Hypoglycemia and intervention: No  Fever: No  TPN and/or TF: No    /66 (BP Location: Right arm, Patient Position: Lying)   Pulse 75   Temp 98.3 °F (36.8 °C) (Oral)   Resp 18   Ht 5' 2" (1.575 m)   Wt 102.2 kg (225 lb 5 oz)   LMP 2001 (Approximate)   SpO2 96%   Breastfeeding? No   BMI 41.21 kg/m²     Labs Reviewed and Include    Recent Labs   Lab 19  0546   *   CALCIUM 9.4   *   K 4.6   CO2 26      BUN 24*   CREATININE 1.3     Lab Results   Component Value Date    WBC 8.94 2019    HGB 8.6 (L) 2019    HCT 27.3 (L) 2019    MCV 89 2019     2019     No results for input(s): TSH, FREET4 in the last 168 hours.  Lab Results   Component Value Date    HGBA1C 7.5 (H) 2019       Nutritional status:   Body mass index is 41.21 kg/m².  Lab Results   Component Value Date    ALBUMIN 3.3 (L) 2019    ALBUMIN 3.3 (L) 2019    ALBUMIN 3.0 (L) 2019     No results found for: PREALBUMIN    Estimated Creatinine Clearance: 43.8 mL/min (based on SCr of 1.3 mg/dL).    Accu-Checks  Recent Labs     19  1633 19  2127 19  0751 19  1158 19  1636 19  2149 19  0736 19  1139 19  1723 19  2154   POCTGLUCOSE 185* 243* 155* 237* 273* 229* 168* 256* 160* 213*       Current Medications and/or Treatments Impacting Glycemic Control  Immunotherapy:    Immunosuppressants     None        Steroids:   Hormones (From admission, onward)    None        Pressors:    Autonomic Drugs (From admission, onward)    None        Hyperglycemia/Diabetes Medications:   Antihyperglycemics (From admission, onward)    Start     Stop Route Frequency Ordered    19 0745  insulin aspart U-100 pen 7 Units      -- SubQ 3 times daily with meals " 08/18/19 0718 08/16/19 2100  insulin detemir U-100 pen 10 Units      -- SubQ Nightly 08/16/19 1009    08/16/19 0908  insulin aspart U-100 pen 0-5 Units      -- SubQ Before meals & nightly PRN 08/16/19 0809    08/13/19 0945  insulin regular (Humulin R) 100 Units in sodium chloride 0.9% 100 mL infusion      08/14 2100 IV Continuous 08/13/19 0837

## 2019-08-18 LAB
ANION GAP SERPL CALC-SCNC: 10 MMOL/L (ref 8–16)
APTT BLDCRRT: 34.2 SEC (ref 21–32)
BASOPHILS # BLD AUTO: 0.01 K/UL (ref 0–0.2)
BASOPHILS NFR BLD: 0.1 % (ref 0–1.9)
BUN SERPL-MCNC: 23 MG/DL (ref 8–23)
CALCIUM SERPL-MCNC: 9.1 MG/DL (ref 8.7–10.5)
CHLORIDE SERPL-SCNC: 101 MMOL/L (ref 95–110)
CO2 SERPL-SCNC: 26 MMOL/L (ref 23–29)
CREAT SERPL-MCNC: 1.2 MG/DL (ref 0.5–1.4)
DIFFERENTIAL METHOD: ABNORMAL
EOSINOPHIL # BLD AUTO: 0.2 K/UL (ref 0–0.5)
EOSINOPHIL NFR BLD: 2.4 % (ref 0–8)
ERYTHROCYTE [DISTWIDTH] IN BLOOD BY AUTOMATED COUNT: 14.5 % (ref 11.5–14.5)
EST. GFR  (AFRICAN AMERICAN): 52.2 ML/MIN/1.73 M^2
EST. GFR  (NON AFRICAN AMERICAN): 45.3 ML/MIN/1.73 M^2
GLUCOSE SERPL-MCNC: 168 MG/DL (ref 70–110)
HCT VFR BLD AUTO: 27.2 % (ref 37–48.5)
HGB BLD-MCNC: 8.3 G/DL (ref 12–16)
IMM GRANULOCYTES # BLD AUTO: 0.09 K/UL (ref 0–0.04)
IMM GRANULOCYTES NFR BLD AUTO: 1.2 % (ref 0–0.5)
LYMPHOCYTES # BLD AUTO: 1.3 K/UL (ref 1–4.8)
LYMPHOCYTES NFR BLD: 17.6 % (ref 18–48)
MAGNESIUM SERPL-MCNC: 1.7 MG/DL (ref 1.6–2.6)
MCH RBC QN AUTO: 27.2 PG (ref 27–31)
MCHC RBC AUTO-ENTMCNC: 30.5 G/DL (ref 32–36)
MCV RBC AUTO: 89 FL (ref 82–98)
MONOCYTES # BLD AUTO: 0.5 K/UL (ref 0.3–1)
MONOCYTES NFR BLD: 7.1 % (ref 4–15)
NEUTROPHILS # BLD AUTO: 5.4 K/UL (ref 1.8–7.7)
NEUTROPHILS NFR BLD: 71.6 % (ref 38–73)
NRBC BLD-RTO: 0 /100 WBC
PHOSPHATE SERPL-MCNC: 2.4 MG/DL (ref 2.7–4.5)
PLATELET # BLD AUTO: 232 K/UL (ref 150–350)
PMV BLD AUTO: 10.2 FL (ref 9.2–12.9)
POCT GLUCOSE: 160 MG/DL (ref 70–110)
POCT GLUCOSE: 168 MG/DL (ref 70–110)
POCT GLUCOSE: 213 MG/DL (ref 70–110)
POCT GLUCOSE: 256 MG/DL (ref 70–110)
POTASSIUM SERPL-SCNC: 4.4 MMOL/L (ref 3.5–5.1)
RBC # BLD AUTO: 3.05 M/UL (ref 4–5.4)
SODIUM SERPL-SCNC: 137 MMOL/L (ref 136–145)
WBC # BLD AUTO: 7.48 K/UL (ref 3.9–12.7)

## 2019-08-18 PROCEDURE — 85025 COMPLETE CBC W/AUTO DIFF WBC: CPT

## 2019-08-18 PROCEDURE — 83735 ASSAY OF MAGNESIUM: CPT

## 2019-08-18 PROCEDURE — 63600175 PHARM REV CODE 636 W HCPCS: Performed by: STUDENT IN AN ORGANIZED HEALTH CARE EDUCATION/TRAINING PROGRAM

## 2019-08-18 PROCEDURE — 94660 CPAP INITIATION&MGMT: CPT

## 2019-08-18 PROCEDURE — 27000221 HC OXYGEN, UP TO 24 HOURS

## 2019-08-18 PROCEDURE — 63600175 PHARM REV CODE 636 W HCPCS: Performed by: PHYSICIAN ASSISTANT

## 2019-08-18 PROCEDURE — 25000003 PHARM REV CODE 250: Performed by: THORACIC SURGERY (CARDIOTHORACIC VASCULAR SURGERY)

## 2019-08-18 PROCEDURE — 36415 COLL VENOUS BLD VENIPUNCTURE: CPT

## 2019-08-18 PROCEDURE — 25000003 PHARM REV CODE 250: Performed by: SURGERY

## 2019-08-18 PROCEDURE — 94761 N-INVAS EAR/PLS OXIMETRY MLT: CPT

## 2019-08-18 PROCEDURE — 80048 BASIC METABOLIC PNL TOTAL CA: CPT

## 2019-08-18 PROCEDURE — 85730 THROMBOPLASTIN TIME PARTIAL: CPT

## 2019-08-18 PROCEDURE — 84100 ASSAY OF PHOSPHORUS: CPT

## 2019-08-18 PROCEDURE — 99900035 HC TECH TIME PER 15 MIN (STAT)

## 2019-08-18 PROCEDURE — 20600001 HC STEP DOWN PRIVATE ROOM

## 2019-08-18 PROCEDURE — 25000003 PHARM REV CODE 250: Performed by: PHYSICIAN ASSISTANT

## 2019-08-18 RX ORDER — INSULIN ASPART 100 [IU]/ML
7 INJECTION, SOLUTION INTRAVENOUS; SUBCUTANEOUS
Status: DISCONTINUED | OUTPATIENT
Start: 2019-08-18 | End: 2019-08-19

## 2019-08-18 RX ORDER — BACLOFEN 20 MG
500 TABLET ORAL ONCE
Status: DISCONTINUED | OUTPATIENT
Start: 2019-08-18 | End: 2019-08-18 | Stop reason: SDUPTHER

## 2019-08-18 RX ORDER — LANOLIN ALCOHOL/MO/W.PET/CERES
400 CREAM (GRAM) TOPICAL
Status: COMPLETED | OUTPATIENT
Start: 2019-08-18 | End: 2019-08-18

## 2019-08-18 RX ADMIN — ATORVASTATIN CALCIUM 40 MG: 20 TABLET, FILM COATED ORAL at 09:08

## 2019-08-18 RX ADMIN — INSULIN ASPART 3 UNITS: 100 INJECTION, SOLUTION INTRAVENOUS; SUBCUTANEOUS at 11:08

## 2019-08-18 RX ADMIN — HYDRALAZINE HYDROCHLORIDE 10 MG: 20 INJECTION INTRAMUSCULAR; INTRAVENOUS at 03:08

## 2019-08-18 RX ADMIN — DOCUSATE SODIUM 100 MG: 100 CAPSULE, LIQUID FILLED ORAL at 09:08

## 2019-08-18 RX ADMIN — POTASSIUM CHLORIDE 20 MEQ: 20 TABLET, EXTENDED RELEASE ORAL at 09:08

## 2019-08-18 RX ADMIN — HEPARIN SODIUM 5000 UNITS: 5000 INJECTION, SOLUTION INTRAVENOUS; SUBCUTANEOUS at 02:08

## 2019-08-18 RX ADMIN — HEPARIN SODIUM 5000 UNITS: 5000 INJECTION, SOLUTION INTRAVENOUS; SUBCUTANEOUS at 05:08

## 2019-08-18 RX ADMIN — LOSARTAN POTASSIUM 50 MG: 50 TABLET, FILM COATED ORAL at 08:08

## 2019-08-18 RX ADMIN — INSULIN ASPART 7 UNITS: 100 INJECTION, SOLUTION INTRAVENOUS; SUBCUTANEOUS at 08:08

## 2019-08-18 RX ADMIN — METOPROLOL TARTRATE 75 MG: 50 TABLET ORAL at 09:08

## 2019-08-18 RX ADMIN — INSULIN ASPART 7 UNITS: 100 INJECTION, SOLUTION INTRAVENOUS; SUBCUTANEOUS at 05:08

## 2019-08-18 RX ADMIN — FUROSEMIDE 20 MG: 10 INJECTION, SOLUTION INTRAMUSCULAR; INTRAVENOUS at 05:08

## 2019-08-18 RX ADMIN — MAGNESIUM OXIDE TAB 400 MG (241.3 MG ELEMENTAL MG) 400 MG: 400 (241.3 MG) TAB at 05:08

## 2019-08-18 RX ADMIN — PANTOPRAZOLE SODIUM 40 MG: 40 TABLET, DELAYED RELEASE ORAL at 05:08

## 2019-08-18 RX ADMIN — INSULIN ASPART 1 UNITS: 100 INJECTION, SOLUTION INTRAVENOUS; SUBCUTANEOUS at 09:08

## 2019-08-18 RX ADMIN — INSULIN DETEMIR 10 UNITS: 100 INJECTION, SOLUTION SUBCUTANEOUS at 09:08

## 2019-08-18 RX ADMIN — AMIODARONE HYDROCHLORIDE 400 MG: 200 TABLET ORAL at 09:08

## 2019-08-18 RX ADMIN — ASPIRIN 325 MG: 325 TABLET, DELAYED RELEASE ORAL at 08:08

## 2019-08-18 RX ADMIN — FUROSEMIDE 20 MG: 10 INJECTION, SOLUTION INTRAMUSCULAR; INTRAVENOUS at 08:08

## 2019-08-18 RX ADMIN — METOPROLOL TARTRATE 75 MG: 50 TABLET ORAL at 08:08

## 2019-08-18 RX ADMIN — MAGNESIUM OXIDE TAB 400 MG (241.3 MG ELEMENTAL MG) 400 MG: 400 (241.3 MG) TAB at 09:08

## 2019-08-18 RX ADMIN — POTASSIUM CHLORIDE 20 MEQ: 20 TABLET, EXTENDED RELEASE ORAL at 08:08

## 2019-08-18 RX ADMIN — METOPROLOL TARTRATE 75 MG: 50 TABLET ORAL at 02:08

## 2019-08-18 RX ADMIN — INSULIN ASPART 7 UNITS: 100 INJECTION, SOLUTION INTRAVENOUS; SUBCUTANEOUS at 11:08

## 2019-08-18 RX ADMIN — HEPARIN SODIUM 5000 UNITS: 5000 INJECTION, SOLUTION INTRAVENOUS; SUBCUTANEOUS at 09:08

## 2019-08-18 RX ADMIN — AMIODARONE HYDROCHLORIDE 400 MG: 200 TABLET ORAL at 08:08

## 2019-08-18 RX ADMIN — HYDRALAZINE HYDROCHLORIDE 10 MG: 20 INJECTION INTRAMUSCULAR; INTRAVENOUS at 06:08

## 2019-08-18 NOTE — PROGRESS NOTES
[facility]  Cardiothoracic Surgery  Progress Note    Patient Name: Kaylee Romero  MRN: [id number]  Admission Date: 8/12/2019  Hospital Length of Stay: 6 days  Code Status: Prior   Attending Physician: Peterson Ventura MD   Referring Provider: Peterson Ventura MD  Principal Problem:S/P CABG (coronary artery bypass graft)      Subjective:     Post-Op Info:  Procedure(s) (LRB):  CORONARY ARTERY BYPASS GRAFT (CABG)X3 (N/A)  SURGICAL PROCUREMENT, VEIN, ENDOSCOPIC (Left)   6 Days Post-Op     Interval History: chest tubes removed yesterday with appropriate post-pull CXR; weaning NC.    ROS  Medications:  Continuous Infusions:  Scheduled Meds:   amiodarone  400 mg Oral BID    aspirin  325 mg Oral Daily    atorvastatin  40 mg Oral QHS    docusate sodium  100 mg Oral BID    furosemide  20 mg Intravenous BID    heparin (porcine)  5,000 Units Subcutaneous Q8H    insulin aspart U-100  7 Units Subcutaneous TIDWM    insulin detemir U-100  10 Units Subcutaneous QHS    losartan  50 mg Oral Daily    metoprolol tartrate  75 mg Oral TID    pantoprazole  40 mg Oral Before breakfast    polyethylene glycol  17 g Oral Daily    potassium chloride  20 mEq Oral BID     PRN Meds:acetaminophen, bisacodyl, Dextrose 10% Bolus, Dextrose 10% Bolus, glucagon (human recombinant), glucose, glucose, hydrALAZINE, insulin aspart U-100, metoclopramide HCl, metoprolol, ondansetron, oxyCODONE, oxyCODONE, sodium chloride 0.9%     Objective:     Vital Signs (Most Recent):  Temp: 97.6 °F (36.4 °C) (08/18/19 1148)  Pulse: 77 (08/18/19 1148)  Resp: 18 (08/18/19 1148)  BP: 105/72 (08/18/19 1148)  SpO2: 97 % (08/18/19 0732) Vital Signs (24h Range):  Temp:  [97.1 °F (36.2 °C)-98.2 °F (36.8 °C)] 97.6 °F (36.4 °C)  Pulse:  [65-86] 77  Resp:  [18-20] 18  SpO2:  [92 %-99 %] 97 %  BP: (105-153)/(54-88) 105/72     Weight: 103.3 kg (227 lb 11.8 oz)  Body mass index is 41.65 kg/m².    SpO2: 97 %  O2 Device (Oxygen Therapy): nasal  cannula    Intake/Output - Last 3 Shifts       700 -  0659  07 -  0659  07 -  0659    P.O. 690 480     Total Intake(mL/kg) 690 (6.6) 480 (4.6)     Urine (mL/kg/hr) 1300 (0.5) 800 (0.3)     Stool 0 1     Chest Tube 30 0     Total Output 1330 801     Net -640 -321            Stool Occurrence 1 x            Lines/Drains/Airways     Drain                 Y Chest Tube 1 and 2 19 1302 Right Mediastinal 19 Fr. Left Mediastinal 19 Fr. 5 days          Peripheral Intravenous Line                 Peripheral IV - Single Lumen 07/10/19 1324 22 G Anterior;Left Forearm 38 days         Peripheral IV - Single Lumen 19 0955 20 G;1 3/4 in Left Upper Arm 2 days                Physical Exam  Regular rate; incisions c/d/i  Significant Labs:  All pertinent labs from the last 24 hours have been reviewed.  MA.7; phos: 2.4    Significant Diagnostics:  I have reviewed all pertinent imaging results/findings within the past 24 hours.    Assessment/Plan:     * S/P CABG (coronary artery bypass graft)  S/P CABG (coronary artery bypass graft)  Patient is a 75yo female s/p CABG x2 with Dr. Ventura on 19. PMH significant for CAD, HTN, hx TIA.        - Analgesia: oxycodone PRN  - History of TIA, no acute findings  - 1L NC - wean as tolerated   - Respiratory treatment q4h  - IS  - MAP goals 60-80  - Monitor UOP with Strict I&Os  - Trend BUN/Cr  - Guevara catheter D/C  - Lasix 20 BID IV   - replete electrolytes prn  - DVT: SCDs, heparin subq  - GI: Famotidine BID  - Metop 75 BID  - IV hydralazine and Metop PRN   - Losartan 50  - continue PO Amiodarone    Type 2 diabetes mellitus with stage 3 chronic kidney disease, without long-term current use of insulin  Endocrine following     KAMRAN on CPAP  CPAP at night     Morbid obesity with body mass index (BMI) of 40.0 or higher  Cardiac diet   PT/OT      Rom Cespedes MD  Cardiothoracic Surgery  [facility]

## 2019-08-18 NOTE — PLAN OF CARE
Problem: Adult Inpatient Plan of Care  Goal: Plan of Care Review  8/12: Admit to SICU s/p CABG x 3. Levo on. 500 albumin. Propofol d/c post-op. 1 amp bicarb.  Insulin and cardene started  8/13: levo off.    Outcome: Ongoing (interventions implemented as appropriate)  Pt free of falls and injury during shift. POC reviewed with pt VS stable and AAox4. SR on telemetry. POC  ,admin 1 Units of SS insulin. MSI precautions enforced. Educated the importance of using heart pillow and IS. PRN pain med admin. Chest xray taken during shift NC on 1L. No acute events noted at this time. No complaints. Educated pt why they are at risk for falls and to use call light for assistance ambulating. Yellow non-slip socks on pt. Bed low and locked, call light with in reach. Will continue to monitor.

## 2019-08-18 NOTE — PROGRESS NOTES
[facility]  Cardiothoracic Surgery  Progress Note    Patient Name: Kaylee Romero  MRN: [id number]  Admission Date: 8/12/2019  Hospital Length of Stay: 5 days  Code Status: Prior   Attending Physician: Peterson Ventura MD   Referring Provider: Peterson Ventura MD  Principal Problem:S/P CABG (coronary artery bypass graft)            Subjective:     Post-Op Info:  Procedure(s) (LRB):  CORONARY ARTERY BYPASS GRAFT (CABG)X3 (N/A)  SURGICAL PROCUREMENT, VEIN, ENDOSCOPIC (Left)   6 Days Post-Op     Interval History: NAEON. Pain improved. HR in 70s. Wean O2 as tolerated. Stressed importance of increasing ambulation and using IS.      Review of Systems   Constitution: Positive for malaise/fatigue.   Cardiovascular: Positive for chest pain and dyspnea on exertion.   Respiratory: Positive for shortness of breath.    Neurological: Positive for weakness.     Medications:  Continuous Infusions:  Scheduled Meds:   amiodarone  400 mg Oral BID    aspirin  325 mg Oral Daily    atorvastatin  40 mg Oral QHS    docusate sodium  100 mg Oral BID    furosemide  20 mg Intravenous BID    heparin (porcine)  5,000 Units Subcutaneous Q8H    insulin aspart U-100  7 Units Subcutaneous TIDWM    insulin detemir U-100  10 Units Subcutaneous QHS    losartan  50 mg Oral Daily    metoprolol tartrate  75 mg Oral TID    pantoprazole  40 mg Oral Before breakfast    polyethylene glycol  17 g Oral Daily    potassium chloride  20 mEq Oral BID     PRN Meds:acetaminophen, bisacodyl, Dextrose 10% Bolus, Dextrose 10% Bolus, glucagon (human recombinant), glucose, glucose, hydrALAZINE, insulin aspart U-100, metoclopramide HCl, metoprolol, ondansetron, oxyCODONE, oxyCODONE, sodium chloride 0.9%     Objective:     Vital Signs (Most Recent):  Temp: 98 °F (36.7 °C) (08/18/19 0732)  Pulse: 85 (08/18/19 0732)  Resp: 18 (08/18/19 0732)  BP: 117/66 (08/18/19 0732)  SpO2: 97 % (08/18/19 0732) Vital Signs (24h Range):  Temp:  [97.1  °F (36.2 °C)-98.2 °F (36.8 °C)] 98 °F (36.7 °C)  Pulse:  [65-91] 85  Resp:  [18-20] 18  SpO2:  [92 %-99 %] 97 %  BP: (110-153)/(54-88) 117/66     Weight: 103.3 kg (227 lb 11.8 oz)  Body mass index is 41.65 kg/m².    SpO2: 97 %  O2 Device (Oxygen Therapy): nasal cannula    Intake/Output - Last 3 Shifts       08/16 0700 - 08/17 0659 08/17 0700 - 08/18 0659 08/18 0700 - 08/19 0659    P.O. 690 480     Total Intake(mL/kg) 690 (6.6) 480 (4.6)     Urine (mL/kg/hr) 1300 (0.5) 800 (0.3)     Stool 0 1     Chest Tube 30 0     Total Output 1330 801     Net -640 -321            Stool Occurrence 1 x            Lines/Drains/Airways     Drain                 Y Chest Tube 1 and 2 08/12/19 1302 Right Mediastinal 19 Fr. Left Mediastinal 19 Fr. 5 days          Peripheral Intravenous Line                 Peripheral IV - Single Lumen 07/10/19 1324 22 G Anterior;Left Forearm 38 days         Peripheral IV - Single Lumen 08/16/19 0955 20 G;1 3/4 in Left Upper Arm 2 days                Physical Exam   Constitutional: She is oriented to person, place, and time. She appears well-developed and well-nourished. No distress.   HENT:   Head: Normocephalic and atraumatic.   Eyes: Pupils are equal, round, and reactive to light. EOM are normal.   Neck: Normal range of motion.   Cardiovascular: Regular rhythm, normal heart sounds and normal pulses.   Afib   Pulmonary/Chest: Effort normal. No respiratory distress.   2L NC   Abdominal: Soft.   Musculoskeletal: Normal range of motion.   Neurological: She is alert and oriented to person, place, and time.   Skin: Skin is warm, dry and intact. She is not diaphoretic.   Midline sternal incision c/d/i    Psychiatric: She has a normal mood and affect. Her speech is normal and behavior is normal. Judgment and thought content normal.   Vitals reviewed.      Significant Labs:  BMP:   Recent Labs   Lab 08/18/19  0845   *      K 4.4      CO2 26   BUN 23   CREATININE 1.2   CALCIUM 9.1   MG 1.7      CBC:   Recent Labs   Lab 08/18/19  0845   WBC 7.48   RBC 3.05*   HGB 8.3*   HCT 27.2*      MCV 89   MCH 27.2   MCHC 30.5*       Significant Diagnostics:  I have reviewed all pertinent imaging results/findings within the past 24 hours.    Assessment/Plan:     * S/P CABG (coronary artery bypass graft)  S/P CABG (coronary artery bypass graft)  Patient is a 73yo female s/p CABG x2 with Dr. Ventura on 8/12/19. PMH significant for CAD, HTN, hx TIA.        - Analgesia: oxycodone PRN  - History of TIA, no acute findings  - 2L NC - wean as tolerated   - Respiratory treatment q4h  - IS  - MAP goals 60-80  - d/c CT today and obtain CXR  - Monitor UOP with Strict I&Os  - Trend BUN/Cr  - Guevara catheter D/C  - Lasix 20 BID IV   - replete electrolytes prn  - DVT: SCDs, heparin subq  - GI: Famotidine BID  - Metop increased to 75 BID  - IV hydralazine and Metop PRN   - Losartan 50  - Continue PO Amiodarone for afib      Rom Cespedes MD  Cardiothoracic Surgery  [facility]

## 2019-08-18 NOTE — CARE UPDATE
BG goal 140 - 180. BG above goal with scheduled insulin and correction scale coverage. FBG remains at goal.     Continue levemir 10 units HS.   BG monitoring ac/hs and low dose correction scale coverage.   increase novolog 7 units with meals.       ** Please call Endocrine for any BG related issues **

## 2019-08-18 NOTE — SUBJECTIVE & OBJECTIVE
Interval History: NAEON. Pain improved. HR in 70s. Wean O2 as tolerated. Stressed importance of increasing ambulation and using IS.      Review of Systems   Constitution: Positive for malaise/fatigue.   Cardiovascular: Positive for chest pain and dyspnea on exertion.   Respiratory: Positive for shortness of breath.    Neurological: Positive for weakness.     Medications:  Continuous Infusions:  Scheduled Meds:   amiodarone  400 mg Oral BID    aspirin  325 mg Oral Daily    atorvastatin  40 mg Oral QHS    docusate sodium  100 mg Oral BID    furosemide  20 mg Intravenous BID    heparin (porcine)  5,000 Units Subcutaneous Q8H    insulin aspart U-100  7 Units Subcutaneous TIDWM    insulin detemir U-100  10 Units Subcutaneous QHS    losartan  50 mg Oral Daily    metoprolol tartrate  75 mg Oral TID    pantoprazole  40 mg Oral Before breakfast    polyethylene glycol  17 g Oral Daily    potassium chloride  20 mEq Oral BID     PRN Meds:acetaminophen, bisacodyl, Dextrose 10% Bolus, Dextrose 10% Bolus, glucagon (human recombinant), glucose, glucose, hydrALAZINE, insulin aspart U-100, metoclopramide HCl, metoprolol, ondansetron, oxyCODONE, oxyCODONE, sodium chloride 0.9%     Objective:     Vital Signs (Most Recent):  Temp: 98 °F (36.7 °C) (08/18/19 0732)  Pulse: 85 (08/18/19 0732)  Resp: 18 (08/18/19 0732)  BP: 117/66 (08/18/19 0732)  SpO2: 97 % (08/18/19 0732) Vital Signs (24h Range):  Temp:  [97.1 °F (36.2 °C)-98.2 °F (36.8 °C)] 98 °F (36.7 °C)  Pulse:  [65-91] 85  Resp:  [18-20] 18  SpO2:  [92 %-99 %] 97 %  BP: (110-153)/(54-88) 117/66     Weight: 103.3 kg (227 lb 11.8 oz)  Body mass index is 41.65 kg/m².    SpO2: 97 %  O2 Device (Oxygen Therapy): nasal cannula    Intake/Output - Last 3 Shifts       08/16 0700 - 08/17 0659 08/17 0700 - 08/18 0659 08/18 0700 - 08/19 0659    P.O. 690 480     Total Intake(mL/kg) 690 (6.6) 480 (4.6)     Urine (mL/kg/hr) 1300 (0.5) 800 (0.3)     Stool 0 1     Chest Tube 30 0     Total  Output 1330 801     Net -640 -321            Stool Occurrence 1 x            Lines/Drains/Airways     Drain                 Y Chest Tube 1 and 2 08/12/19 1302 Right Mediastinal 19 Fr. Left Mediastinal 19 Fr. 5 days          Peripheral Intravenous Line                 Peripheral IV - Single Lumen 07/10/19 1324 22 G Anterior;Left Forearm 38 days         Peripheral IV - Single Lumen 08/16/19 0955 20 G;1 3/4 in Left Upper Arm 2 days                Physical Exam   Constitutional: She is oriented to person, place, and time. She appears well-developed and well-nourished. No distress.   HENT:   Head: Normocephalic and atraumatic.   Eyes: Pupils are equal, round, and reactive to light. EOM are normal.   Neck: Normal range of motion.   Cardiovascular: Regular rhythm, normal heart sounds and normal pulses.   Afib   Pulmonary/Chest: Effort normal. No respiratory distress.   2L NC   Abdominal: Soft.   Musculoskeletal: Normal range of motion.   Neurological: She is alert and oriented to person, place, and time.   Skin: Skin is warm, dry and intact. She is not diaphoretic.   Midline sternal incision c/d/i    Psychiatric: She has a normal mood and affect. Her speech is normal and behavior is normal. Judgment and thought content normal.   Vitals reviewed.      Significant Labs:  BMP:   Recent Labs   Lab 08/18/19  0845   *      K 4.4      CO2 26   BUN 23   CREATININE 1.2   CALCIUM 9.1   MG 1.7     CBC:   Recent Labs   Lab 08/18/19  0845   WBC 7.48   RBC 3.05*   HGB 8.3*   HCT 27.2*      MCV 89   MCH 27.2   MCHC 30.5*       Significant Diagnostics:  I have reviewed all pertinent imaging results/findings within the past 24 hours.

## 2019-08-18 NOTE — SUBJECTIVE & OBJECTIVE
Interval History: chest tubes removed yesterday with appropriate post-pull CXR; weaning NC.    ROS  Medications:  Continuous Infusions:  Scheduled Meds:   amiodarone  400 mg Oral BID    aspirin  325 mg Oral Daily    atorvastatin  40 mg Oral QHS    docusate sodium  100 mg Oral BID    furosemide  20 mg Intravenous BID    heparin (porcine)  5,000 Units Subcutaneous Q8H    insulin aspart U-100  7 Units Subcutaneous TIDWM    insulin detemir U-100  10 Units Subcutaneous QHS    losartan  50 mg Oral Daily    metoprolol tartrate  75 mg Oral TID    pantoprazole  40 mg Oral Before breakfast    polyethylene glycol  17 g Oral Daily    potassium chloride  20 mEq Oral BID     PRN Meds:acetaminophen, bisacodyl, Dextrose 10% Bolus, Dextrose 10% Bolus, glucagon (human recombinant), glucose, glucose, hydrALAZINE, insulin aspart U-100, metoclopramide HCl, metoprolol, ondansetron, oxyCODONE, oxyCODONE, sodium chloride 0.9%     Objective:     Vital Signs (Most Recent):  Temp: 97.6 °F (36.4 °C) (08/18/19 1148)  Pulse: 77 (08/18/19 1148)  Resp: 18 (08/18/19 1148)  BP: 105/72 (08/18/19 1148)  SpO2: 97 % (08/18/19 0732) Vital Signs (24h Range):  Temp:  [97.1 °F (36.2 °C)-98.2 °F (36.8 °C)] 97.6 °F (36.4 °C)  Pulse:  [65-86] 77  Resp:  [18-20] 18  SpO2:  [92 %-99 %] 97 %  BP: (105-153)/(54-88) 105/72     Weight: 103.3 kg (227 lb 11.8 oz)  Body mass index is 41.65 kg/m².    SpO2: 97 %  O2 Device (Oxygen Therapy): nasal cannula    Intake/Output - Last 3 Shifts       08/16 0700 - 08/17 0659 08/17 0700 - 08/18 0659 08/18 0700 - 08/19 0659    P.O. 690 480     Total Intake(mL/kg) 690 (6.6) 480 (4.6)     Urine (mL/kg/hr) 1300 (0.5) 800 (0.3)     Stool 0 1     Chest Tube 30 0     Total Output 1330 801     Net -640 -321            Stool Occurrence 1 x            Lines/Drains/Airways     Drain                 Y Chest Tube 1 and 2 08/12/19 1302 Right Mediastinal 19 Fr. Left Mediastinal 19 Fr. 5 days          Peripheral Intravenous Line                  Peripheral IV - Single Lumen 07/10/19 1324 22 G Anterior;Left Forearm 38 days         Peripheral IV - Single Lumen 19 0955 20 G;1 3/4 in Left Upper Arm 2 days                Physical Exam  Regular rate; incisions c/d/i  Significant Labs:  All pertinent labs from the last 24 hours have been reviewed.  MA.7; phos: 2.4    Significant Diagnostics:  I have reviewed all pertinent imaging results/findings within the past 24 hours.

## 2019-08-18 NOTE — PLAN OF CARE
A A O x 4. Free of falls, traumas and injuries. Skin intact. Denies shortness of breath, chest pain, nausea, vomiting. CABG x2 post op day 6. BG monitored ACHS. Patient on 2L NC. Plan of care reviewed with patient. Vital signs stable. Pain monitored. Will continue to monitor.

## 2019-08-19 LAB
ANION GAP SERPL CALC-SCNC: 8 MMOL/L (ref 8–16)
APTT BLDCRRT: 30.4 SEC (ref 21–32)
BASOPHILS # BLD AUTO: 0.05 K/UL (ref 0–0.2)
BASOPHILS NFR BLD: 0.6 % (ref 0–1.9)
BUN SERPL-MCNC: 24 MG/DL (ref 8–23)
CALCIUM SERPL-MCNC: 9.4 MG/DL (ref 8.7–10.5)
CHLORIDE SERPL-SCNC: 100 MMOL/L (ref 95–110)
CO2 SERPL-SCNC: 26 MMOL/L (ref 23–29)
CREAT SERPL-MCNC: 1.3 MG/DL (ref 0.5–1.4)
DIFFERENTIAL METHOD: ABNORMAL
EOSINOPHIL # BLD AUTO: 0.3 K/UL (ref 0–0.5)
EOSINOPHIL NFR BLD: 2.8 % (ref 0–8)
ERYTHROCYTE [DISTWIDTH] IN BLOOD BY AUTOMATED COUNT: 14.7 % (ref 11.5–14.5)
EST. GFR  (AFRICAN AMERICAN): 47.4 ML/MIN/1.73 M^2
EST. GFR  (NON AFRICAN AMERICAN): 41.1 ML/MIN/1.73 M^2
GLUCOSE SERPL-MCNC: 193 MG/DL (ref 70–110)
HCT VFR BLD AUTO: 27.3 % (ref 37–48.5)
HGB BLD-MCNC: 8.6 G/DL (ref 12–16)
IMM GRANULOCYTES # BLD AUTO: 0.16 K/UL (ref 0–0.04)
IMM GRANULOCYTES NFR BLD AUTO: 1.8 % (ref 0–0.5)
LYMPHOCYTES # BLD AUTO: 2.3 K/UL (ref 1–4.8)
LYMPHOCYTES NFR BLD: 25.6 % (ref 18–48)
MAGNESIUM SERPL-MCNC: 1.9 MG/DL (ref 1.6–2.6)
MCH RBC QN AUTO: 27.9 PG (ref 27–31)
MCHC RBC AUTO-ENTMCNC: 31.5 G/DL (ref 32–36)
MCV RBC AUTO: 89 FL (ref 82–98)
MONOCYTES # BLD AUTO: 0.6 K/UL (ref 0.3–1)
MONOCYTES NFR BLD: 6.6 % (ref 4–15)
NEUTROPHILS # BLD AUTO: 5.6 K/UL (ref 1.8–7.7)
NEUTROPHILS NFR BLD: 62.6 % (ref 38–73)
NRBC BLD-RTO: 0 /100 WBC
PHOSPHATE SERPL-MCNC: 3.3 MG/DL (ref 2.7–4.5)
PLATELET # BLD AUTO: 270 K/UL (ref 150–350)
PMV BLD AUTO: 10.4 FL (ref 9.2–12.9)
POCT GLUCOSE: 235 MG/DL (ref 70–110)
POCT GLUCOSE: 235 MG/DL (ref 70–110)
POCT GLUCOSE: 247 MG/DL (ref 70–110)
POCT GLUCOSE: 261 MG/DL (ref 70–110)
POTASSIUM SERPL-SCNC: 4.6 MMOL/L (ref 3.5–5.1)
RBC # BLD AUTO: 3.08 M/UL (ref 4–5.4)
SODIUM SERPL-SCNC: 134 MMOL/L (ref 136–145)
WBC # BLD AUTO: 8.94 K/UL (ref 3.9–12.7)

## 2019-08-19 PROCEDURE — 63600175 PHARM REV CODE 636 W HCPCS: Performed by: PHYSICIAN ASSISTANT

## 2019-08-19 PROCEDURE — 25000003 PHARM REV CODE 250: Performed by: THORACIC SURGERY (CARDIOTHORACIC VASCULAR SURGERY)

## 2019-08-19 PROCEDURE — 25000003 PHARM REV CODE 250: Performed by: PHYSICIAN ASSISTANT

## 2019-08-19 PROCEDURE — 83735 ASSAY OF MAGNESIUM: CPT

## 2019-08-19 PROCEDURE — 36415 COLL VENOUS BLD VENIPUNCTURE: CPT

## 2019-08-19 PROCEDURE — 85025 COMPLETE CBC W/AUTO DIFF WBC: CPT

## 2019-08-19 PROCEDURE — 80048 BASIC METABOLIC PNL TOTAL CA: CPT

## 2019-08-19 PROCEDURE — S5571 INSULIN DISPOS PEN 3 ML: HCPCS | Performed by: NURSE PRACTITIONER

## 2019-08-19 PROCEDURE — 63600175 PHARM REV CODE 636 W HCPCS: Performed by: NURSE PRACTITIONER

## 2019-08-19 PROCEDURE — 99232 PR SUBSEQUENT HOSPITAL CARE,LEVL II: ICD-10-PCS | Mod: ,,, | Performed by: NURSE PRACTITIONER

## 2019-08-19 PROCEDURE — 84100 ASSAY OF PHOSPHORUS: CPT

## 2019-08-19 PROCEDURE — 85730 THROMBOPLASTIN TIME PARTIAL: CPT

## 2019-08-19 PROCEDURE — 99232 SBSQ HOSP IP/OBS MODERATE 35: CPT | Mod: ,,, | Performed by: NURSE PRACTITIONER

## 2019-08-19 PROCEDURE — 20600001 HC STEP DOWN PRIVATE ROOM

## 2019-08-19 PROCEDURE — 97535 SELF CARE MNGMENT TRAINING: CPT

## 2019-08-19 PROCEDURE — 63600175 PHARM REV CODE 636 W HCPCS: Performed by: STUDENT IN AN ORGANIZED HEALTH CARE EDUCATION/TRAINING PROGRAM

## 2019-08-19 PROCEDURE — 94761 N-INVAS EAR/PLS OXIMETRY MLT: CPT

## 2019-08-19 PROCEDURE — 99900035 HC TECH TIME PER 15 MIN (STAT)

## 2019-08-19 PROCEDURE — 27000221 HC OXYGEN, UP TO 24 HOURS

## 2019-08-19 PROCEDURE — 94660 CPAP INITIATION&MGMT: CPT

## 2019-08-19 RX ORDER — INSULIN ASPART 100 [IU]/ML
8 INJECTION, SOLUTION INTRAVENOUS; SUBCUTANEOUS
Status: DISCONTINUED | OUTPATIENT
Start: 2019-08-19 | End: 2019-08-19

## 2019-08-19 RX ORDER — INSULIN ASPART 100 [IU]/ML
10 INJECTION, SOLUTION INTRAVENOUS; SUBCUTANEOUS
Status: DISCONTINUED | OUTPATIENT
Start: 2019-08-20 | End: 2019-08-21 | Stop reason: HOSPADM

## 2019-08-19 RX ADMIN — INSULIN ASPART 3 UNITS: 100 INJECTION, SOLUTION INTRAVENOUS; SUBCUTANEOUS at 11:08

## 2019-08-19 RX ADMIN — HEPARIN SODIUM 5000 UNITS: 5000 INJECTION, SOLUTION INTRAVENOUS; SUBCUTANEOUS at 10:08

## 2019-08-19 RX ADMIN — INSULIN ASPART 8 UNITS: 100 INJECTION, SOLUTION INTRAVENOUS; SUBCUTANEOUS at 11:08

## 2019-08-19 RX ADMIN — HYDRALAZINE HYDROCHLORIDE 10 MG: 20 INJECTION INTRAMUSCULAR; INTRAVENOUS at 05:08

## 2019-08-19 RX ADMIN — METOPROLOL TARTRATE 75 MG: 50 TABLET ORAL at 08:08

## 2019-08-19 RX ADMIN — AMIODARONE HYDROCHLORIDE 400 MG: 200 TABLET ORAL at 09:08

## 2019-08-19 RX ADMIN — FUROSEMIDE 20 MG: 10 INJECTION, SOLUTION INTRAMUSCULAR; INTRAVENOUS at 05:08

## 2019-08-19 RX ADMIN — HEPARIN SODIUM 5000 UNITS: 5000 INJECTION, SOLUTION INTRAVENOUS; SUBCUTANEOUS at 02:08

## 2019-08-19 RX ADMIN — LOSARTAN POTASSIUM 50 MG: 50 TABLET, FILM COATED ORAL at 09:08

## 2019-08-19 RX ADMIN — INSULIN ASPART 1 UNITS: 100 INJECTION, SOLUTION INTRAVENOUS; SUBCUTANEOUS at 08:08

## 2019-08-19 RX ADMIN — INSULIN ASPART 7 UNITS: 100 INJECTION, SOLUTION INTRAVENOUS; SUBCUTANEOUS at 08:08

## 2019-08-19 RX ADMIN — DOCUSATE SODIUM 100 MG: 100 CAPSULE, LIQUID FILLED ORAL at 08:08

## 2019-08-19 RX ADMIN — POTASSIUM CHLORIDE 20 MEQ: 20 TABLET, EXTENDED RELEASE ORAL at 09:08

## 2019-08-19 RX ADMIN — ATORVASTATIN CALCIUM 40 MG: 20 TABLET, FILM COATED ORAL at 08:08

## 2019-08-19 RX ADMIN — INSULIN ASPART 2 UNITS: 100 INJECTION, SOLUTION INTRAVENOUS; SUBCUTANEOUS at 04:08

## 2019-08-19 RX ADMIN — INSULIN DETEMIR 12 UNITS: 100 INJECTION, SOLUTION SUBCUTANEOUS at 08:08

## 2019-08-19 RX ADMIN — PANTOPRAZOLE SODIUM 40 MG: 40 TABLET, DELAYED RELEASE ORAL at 05:08

## 2019-08-19 RX ADMIN — INSULIN ASPART 2 UNITS: 100 INJECTION, SOLUTION INTRAVENOUS; SUBCUTANEOUS at 08:08

## 2019-08-19 RX ADMIN — AMIODARONE HYDROCHLORIDE 400 MG: 200 TABLET ORAL at 08:08

## 2019-08-19 RX ADMIN — POTASSIUM CHLORIDE 20 MEQ: 20 TABLET, EXTENDED RELEASE ORAL at 08:08

## 2019-08-19 RX ADMIN — ASPIRIN 325 MG: 325 TABLET, DELAYED RELEASE ORAL at 09:08

## 2019-08-19 RX ADMIN — METOPROLOL TARTRATE 75 MG: 50 TABLET ORAL at 09:08

## 2019-08-19 RX ADMIN — FUROSEMIDE 20 MG: 10 INJECTION, SOLUTION INTRAMUSCULAR; INTRAVENOUS at 09:08

## 2019-08-19 RX ADMIN — HEPARIN SODIUM 5000 UNITS: 5000 INJECTION, SOLUTION INTRAVENOUS; SUBCUTANEOUS at 05:08

## 2019-08-19 RX ADMIN — INSULIN ASPART 8 UNITS: 100 INJECTION, SOLUTION INTRAVENOUS; SUBCUTANEOUS at 04:08

## 2019-08-19 RX ADMIN — METOPROLOL TARTRATE 75 MG: 50 TABLET ORAL at 02:08

## 2019-08-19 NOTE — SUBJECTIVE & OBJECTIVE
Interval History: NAEON. Reports walking ~1 time this weekend. On room air this morning. VSS.      Review of Systems   Constitution: Negative for malaise/fatigue.   Cardiovascular: Positive for dyspnea on exertion. Negative for chest pain, irregular heartbeat and leg swelling.   Hematologic/Lymphatic: Negative for bleeding problem.   Neurological: Positive for weakness.     Medications:  Continuous Infusions:  Scheduled Meds:   amiodarone  400 mg Oral BID    aspirin  325 mg Oral Daily    atorvastatin  40 mg Oral QHS    docusate sodium  100 mg Oral BID    furosemide  20 mg Intravenous BID    heparin (porcine)  5,000 Units Subcutaneous Q8H    insulin aspart U-100  8 Units Subcutaneous TIDWM    insulin detemir U-100  12 Units Subcutaneous QHS    losartan  50 mg Oral Daily    metoprolol tartrate  75 mg Oral TID    pantoprazole  40 mg Oral Before breakfast    polyethylene glycol  17 g Oral Daily    potassium chloride  20 mEq Oral BID     PRN Meds:acetaminophen, bisacodyl, Dextrose 10% Bolus, Dextrose 10% Bolus, glucagon (human recombinant), glucose, glucose, hydrALAZINE, insulin aspart U-100, metoclopramide HCl, metoprolol, ondansetron, oxyCODONE, oxyCODONE, sodium chloride 0.9%     Objective:     Vital Signs (Most Recent):  Temp: 98.3 °F (36.8 °C) (08/19/19 1134)  Pulse: 75 (08/19/19 1200)  Resp: 18 (08/19/19 1134)  BP: (!) 100/56 (08/19/19 1134)  SpO2: 98 % (08/19/19 1134) Vital Signs (24h Range):  Temp:  [98 °F (36.7 °C)-98.4 °F (36.9 °C)] 98.3 °F (36.8 °C)  Pulse:  [70-89] 75  Resp:  [13-21] 18  SpO2:  [94 %-98 %] 98 %  BP: (100-157)/(56-75) 100/56     Weight: 102.2 kg (225 lb 5 oz)  Body mass index is 41.21 kg/m².    SpO2: 98 %  O2 Device (Oxygen Therapy): CPAP    Intake/Output - Last 3 Shifts       08/17 0700 - 08/18 0659 08/18 0700 - 08/19 0659 08/19 0700 - 08/20 0659    P.O. 480 240     Total Intake(mL/kg) 480 (4.6) 240 (2.3)     Urine (mL/kg/hr) 800 (0.3)  300 (0.5)    Stool 1      Chest Tube 0       Total Output 801  300    Net -321 +240 -300           Urine Occurrence  1 x           Lines/Drains/Airways     Peripheral Intravenous Line                 Peripheral IV - Single Lumen 07/10/19 1324 22 G Anterior;Left Forearm 39 days         Peripheral IV - Single Lumen 08/16/19 0955 20 G;1 3/4 in Left Upper Arm 3 days                Physical Exam   Constitutional: She is oriented to person, place, and time. She appears well-developed and well-nourished. No distress.   HENT:   Head: Normocephalic and atraumatic.   Eyes: Pupils are equal, round, and reactive to light. EOM are normal.   Neck: Normal range of motion.   Cardiovascular: Normal rate, regular rhythm and normal pulses.   Pulmonary/Chest: Effort normal. No respiratory distress.   Abdominal: Soft.   Musculoskeletal: Normal range of motion.   Neurological: She is alert and oriented to person, place, and time.   Skin: Skin is warm, dry and intact. Capillary refill takes less than 2 seconds. She is not diaphoretic.   Midline sternal incision c/d/i    Psychiatric: She has a normal mood and affect. Her speech is normal and behavior is normal. Judgment and thought content normal.   Vitals reviewed.      Significant Labs:  BMP:   Recent Labs   Lab 08/19/19  0546   *   *   K 4.6      CO2 26   BUN 24*   CREATININE 1.3   CALCIUM 9.4   MG 1.9     CBC:   Recent Labs   Lab 08/19/19  0547   WBC 8.94   RBC 3.08*   HGB 8.6*   HCT 27.3*      MCV 89   MCH 27.9   MCHC 31.5*       Significant Diagnostics:  I have reviewed all pertinent imaging results/findings within the past 24 hours.

## 2019-08-19 NOTE — PLAN OF CARE
Problem: Occupational Therapy Goal  Goal: Occupational Therapy Goal  Goals to be met by: 8/27/19     Patient will increase functional independence with ADLs by performing:    UE Dressing with [place].  LE Dressing with [place].  Grooming while standing at sink with [place].  Toileting from toilet with [place] for hygiene and clothing management.   Bathing from shower chair with [place].  Toilet transfer to toilet with [place].  Increased functional strength to WFL for B UE.  Upper extremity exercise program x15 reps per handout, with independence.     Outcome: Ongoing (interventions implemented as appropriate)  Continue POC     [place], OTR/L  Pager: [phone]  8/19/2019

## 2019-08-19 NOTE — PLAN OF CARE
Problem: Adult Inpatient Plan of Care  Goal: Plan of Care Review  8/12: Admit to SICU s/p CABG x 3. Levo on. 500 albumin. Propofol d/c post-op. 1 amp bicarb.  Insulin and cardene started  8/13: levo off.    Outcome: Ongoing (interventions implemented as appropriate)  Plan of care reviewed with patient. Patient remained free from falls/injuries/traumas. AAOx4, VSS on 2L NC. Patient refused to sleep on CPAP. Mid sternal incision cleansed with soap and water. Chest tube sites cleansed with soap and water. MSI reinforced and IS encouraged. I&O's monitored. All questions answered, will continue to monitor.

## 2019-08-19 NOTE — PLAN OF CARE
08/19/19 1400   Post-Acute Status   Post-Acute Authorization Placement   Post-Acute Placement Status Set-up Complete     SW spoke with PHN and they assigned the pt to [facility].  SW informed [facility] that the pt will not dc today.  SW will f/u as needed.    [place], [facility] [place] x [phone]

## 2019-08-19 NOTE — PROGRESS NOTES
"[facility]  Endocrinology  Progress Note    Admit Date: 2019     Reason for Consult: Management of T2DM, Hyperglycemia     Surgical Procedure and Date: CABG 19    Diabetes diagnosis year:     Lab Results   Component Value Date    HGBA1C 7.5 (H) 2019       Home Diabetes Medications: Levemir 8 q HS, Glipizide 20 mg daily, Trulicity 1.5 mg SQ once weekly    How often checking glucose at home?  Once daily   BG readings on regimen: 100s  Hypoglycemia on the regimen?  no  Missed doses on regimen? no    Diabetes Complications include:     none  Complicating diabetes co morbidities:   History of MI, KAMRAN and CKD      HPI:   Patient is a 74 y.o. female with a diagnosis of DM2, KAMRAN, CKD, and CAD, who is s/p CABG on 19.  Patient has a complex diabetic history, on triple therapy.  [facility] [place] [facility] by MAURICIO Pritchard NP on 19.  Positive family history of DM (father).  Endocrinology consulted for DM/BG management.            Interval HPI:   Overnight events: Remains in CTSU. NAEON. BG at or slightly abvoe goal with scheduled insulin and correction scale coverage.   Eatin%  Nausea: No  Hypoglycemia and intervention: No  Fever: No  TPN and/or TF: No    /66 (BP Location: Right arm, Patient Position: Lying)   Pulse 75   Temp 98.3 °F (36.8 °C) (Oral)   Resp 18   Ht 5' 2" (1.575 m)   Wt 102.2 kg (225 lb 5 oz)   LMP 2001 (Approximate)   SpO2 96%   Breastfeeding? No   BMI 41.21 kg/m²      Labs Reviewed and Include    Recent Labs   Lab 19  0546   *   CALCIUM 9.4   *   K 4.6   CO2 26      BUN 24*   CREATININE 1.3     Lab Results   Component Value Date    WBC 8.94 2019    HGB 8.6 (L) 2019    HCT 27.3 (L) 2019    MCV 89 2019     2019     No results for input(s): TSH, FREET4 in the last 168 hours.  Lab Results   Component Value Date    HGBA1C 7.5 (H) 2019       Nutritional status: "   Body mass index is 41.21 kg/m².  Lab Results   Component Value Date    ALBUMIN 3.3 (L) 08/07/2019    ALBUMIN 3.3 (L) 08/01/2019    ALBUMIN 3.0 (L) 07/09/2019     No results found for: PREALBUMIN    Estimated Creatinine Clearance: 43.8 mL/min (based on SCr of 1.3 mg/dL).    Accu-Checks  Recent Labs     08/16/19  1633 08/16/19  2127 08/17/19  0751 08/17/19  1158 08/17/19  1636 08/17/19  2149 08/18/19  0736 08/18/19  1139 08/18/19  1723 08/18/19  2154   POCTGLUCOSE 185* 243* 155* 237* 273* 229* 168* 256* 160* 213*       Current Medications and/or Treatments Impacting Glycemic Control  Immunotherapy:    Immunosuppressants     None        Steroids:   Hormones (From admission, onward)    None        Pressors:    Autonomic Drugs (From admission, onward)    None        Hyperglycemia/Diabetes Medications:   Antihyperglycemics (From admission, onward)    Start     Stop Route Frequency Ordered    08/18/19 0730  insulin aspart U-100 pen 7 Units      -- SubQ 3 times daily with meals 08/18/19 0718    08/16/19 2100  insulin detemir U-100 pen 10 Units      -- SubQ Nightly 08/16/19 1009    08/16/19 0908  insulin aspart U-100 pen 0-5 Units      -- SubQ Before meals & nightly PRN 08/16/19 0809    08/13/19 0945  insulin regular (Humulin R) 100 Units in sodium chloride 0.9% 100 mL infusion      08/14 2100 IV Continuous 08/13/19 0837          ASSESSMENT and PLAN    * S/P CABG (coronary artery bypass graft)  S/p CABG on 8/12/19  Managed per primary team  Avoid hypoglycemia  Optimize BG control for surgical wound healing        Type 2 diabetes mellitus with stage 3 chronic kidney disease, without long-term current use of insulin  BG goal 140 - 180     increase levemir 12 units HS.   BG monitoring ac/hs and low dose correction scale coverage.   increase novolog 8 units with meals.           Discharge planning: Likely resume home regimen. Levemir dose TBD.         CKD (chronic kidney disease) stage 3, GFR 30-59 ml/min  Titrate insulin  slowly to avoid hypoglycemia as the risk of hypoglycemia increases with decreased creatinine clearance.  Caution with insulin stacking  Estimated Creatinine Clearance: 48.1 mL/min (based on SCr of 1.2 mg/dL).        Morbid obesity with body mass index (BMI) of 40.0 or higher  may contribute to insulin resistance  Body mass index is 42.14 kg/m².            Rachel Guerrero NP  Endocrinology  [facility]

## 2019-08-19 NOTE — PHYSICIAN QUERY
PT Name: Kaylee Romero  MR #: [id number]    Physician Query Form - Atrial Fibrillation Specificity     CDS/: Bonita Squires               Contact information: [email or web address]       This form is a permanent document in the medical record.     Query Date: August 19, 2019    By submitting this query, we are merely seeking further clarification of documentation. Please utilize your independent clinical judgment when addressing the question(s) below.    The medical record contains the following:   Indicators     Supporting Clinical Findings Location in Medical Record   x Atrial Fibrillation Pt appeared to be in afib this morning on monitor. Awaiting IV access from PICC team so PO amiodarone was started and 5mg IV metop were given. CTS note 8/16    x EKG results Narrow QRS tachycardia  Voltage criteria for left ventricular hypertrophy  T wave abnormality, consider lateral ischemia  Abnormal ECG EKG 8/16    x Medication amiodarone tablet 400 mg   Dose: 400 mg  Freq: 2 times daily Route: Oral  Start: 08/16/19 1115    metoprolol tartrate tablet 75 mg   Dose: 75 mg  Freq: 3 times daily Route: Oral  Start: 08/16/19 0900 MAR           MAR     Treatment      Other         Provider, please further specify the Atrial Fibrillation diagnosis.    [X ] Paroxysmal   [  ] Other (please specify):   [  ] Clinically Undetermined       Please document in your progress notes daily for the duration of treatment until resolved, and include in your discharge summary.

## 2019-08-19 NOTE — PROGRESS NOTES
[facility]  Cardiothoracic Surgery  Progress Note    Patient Name: Kaylee Romero  MRN: [id number]  Admission Date: 8/12/2019  Hospital Length of Stay: 7 days  Code Status: Prior   Attending Physician: Peterson Ventura MD   Referring Provider: Peterson Ventura MD  Principal Problem:S/P CABG (coronary artery bypass graft)            Subjective:     Post-Op Info:  Procedure(s) (LRB):  CORONARY ARTERY BYPASS GRAFT (CABG)X3 (N/A)  SURGICAL PROCUREMENT, VEIN, ENDOSCOPIC (Left)   7 Days Post-Op     No new subjective & objective note has been filed under this hospital service since the last note was generated.    Assessment/Plan:     * S/P CABG (coronary artery bypass graft)  S/P CABG (coronary artery bypass graft)  Patient is a 73yo female s/p CABG x2 with Dr. Ventura on 8/12/19. PMH significant for CAD, HTN, hx TIA.        - Analgesia: oxycodone PRN  - History of TIA, no acute findings  - On room air this AM  - Respiratory treatment q4h  - IS  - MAP goals 60-80  - Monitor UOP with Strict I&Os  - Trend BUN/Cr  - Guevara catheter D/C  - Lasix 20 BID IV   - replete electrolytes prn  - DVT: SCDs, heparin subq  - GI: Famotidine BID  - Metop 75 BID  - IV hydralazine and Metop PRN   - Losartan 50  - continue PO Amiodarone    Hypophosphatemia  Phos 3.3  K phos PRN      Acute blood loss anemia  - CBC daily  - Hb/Hct stable 8.6/27.3    Type 2 diabetes mellitus with stage 3 chronic kidney disease, without long-term current use of insulin  Endocrine following     CKD (chronic kidney disease) stage 3, GFR 30-59 ml/min  Creatinine 1.3 from 1.2  Daily labs     KAMRAN on CPAP  CPAP at night     Morbid obesity with body mass index (BMI) of 40.0 or higher  Cardiac diet   PT/OT  Dispo: [facility]. Discharge next couple days     Catherine Baker PA-C  Cardiothoracic Surgery  [facility]

## 2019-08-20 DIAGNOSIS — I25.110 CORONARY ARTERY DISEASE INVOLVING NATIVE CORONARY ARTERY OF NATIVE HEART WITH UNSTABLE ANGINA PECTORIS: Primary | ICD-10-CM

## 2019-08-20 LAB
ANION GAP SERPL CALC-SCNC: 11 MMOL/L (ref 8–16)
APTT BLDCRRT: 25.2 SEC (ref 21–32)
BASOPHILS # BLD AUTO: 0.04 K/UL (ref 0–0.2)
BASOPHILS NFR BLD: 0.4 % (ref 0–1.9)
BUN SERPL-MCNC: 27 MG/DL (ref 8–23)
CALCIUM SERPL-MCNC: 10 MG/DL (ref 8.7–10.5)
CHLORIDE SERPL-SCNC: 102 MMOL/L (ref 95–110)
CO2 SERPL-SCNC: 23 MMOL/L (ref 23–29)
CREAT SERPL-MCNC: 1.3 MG/DL (ref 0.5–1.4)
DIFFERENTIAL METHOD: ABNORMAL
EOSINOPHIL # BLD AUTO: 0.2 K/UL (ref 0–0.5)
EOSINOPHIL NFR BLD: 2.5 % (ref 0–8)
ERYTHROCYTE [DISTWIDTH] IN BLOOD BY AUTOMATED COUNT: 14.7 % (ref 11.5–14.5)
EST. GFR  (AFRICAN AMERICAN): 47.4 ML/MIN/1.73 M^2
EST. GFR  (NON AFRICAN AMERICAN): 41.1 ML/MIN/1.73 M^2
GLUCOSE SERPL-MCNC: 130 MG/DL (ref 70–110)
HCT VFR BLD AUTO: 28.2 % (ref 37–48.5)
HGB BLD-MCNC: 8.8 G/DL (ref 12–16)
IMM GRANULOCYTES # BLD AUTO: 0.17 K/UL (ref 0–0.04)
IMM GRANULOCYTES NFR BLD AUTO: 1.8 % (ref 0–0.5)
LYMPHOCYTES # BLD AUTO: 2.2 K/UL (ref 1–4.8)
LYMPHOCYTES NFR BLD: 23.5 % (ref 18–48)
MAGNESIUM SERPL-MCNC: 1.8 MG/DL (ref 1.6–2.6)
MCH RBC QN AUTO: 27.4 PG (ref 27–31)
MCHC RBC AUTO-ENTMCNC: 31.2 G/DL (ref 32–36)
MCV RBC AUTO: 88 FL (ref 82–98)
MONOCYTES # BLD AUTO: 0.7 K/UL (ref 0.3–1)
MONOCYTES NFR BLD: 6.9 % (ref 4–15)
NEUTROPHILS # BLD AUTO: 6.2 K/UL (ref 1.8–7.7)
NEUTROPHILS NFR BLD: 64.9 % (ref 38–73)
NRBC BLD-RTO: 0 /100 WBC
PHOSPHATE SERPL-MCNC: 4 MG/DL (ref 2.7–4.5)
PLATELET # BLD AUTO: 321 K/UL (ref 150–350)
PMV BLD AUTO: 9.9 FL (ref 9.2–12.9)
POCT GLUCOSE: 148 MG/DL (ref 70–110)
POCT GLUCOSE: 175 MG/DL (ref 70–110)
POCT GLUCOSE: 227 MG/DL (ref 70–110)
POCT GLUCOSE: 334 MG/DL (ref 70–110)
POTASSIUM SERPL-SCNC: 4.4 MMOL/L (ref 3.5–5.1)
RBC # BLD AUTO: 3.21 M/UL (ref 4–5.4)
SODIUM SERPL-SCNC: 136 MMOL/L (ref 136–145)
WBC # BLD AUTO: 9.5 K/UL (ref 3.9–12.7)

## 2019-08-20 PROCEDURE — 94799 UNLISTED PULMONARY SVC/PX: CPT

## 2019-08-20 PROCEDURE — 85730 THROMBOPLASTIN TIME PARTIAL: CPT

## 2019-08-20 PROCEDURE — 25000003 PHARM REV CODE 250: Performed by: THORACIC SURGERY (CARDIOTHORACIC VASCULAR SURGERY)

## 2019-08-20 PROCEDURE — 63600175 PHARM REV CODE 636 W HCPCS: Performed by: STUDENT IN AN ORGANIZED HEALTH CARE EDUCATION/TRAINING PROGRAM

## 2019-08-20 PROCEDURE — 20600001 HC STEP DOWN PRIVATE ROOM

## 2019-08-20 PROCEDURE — 25000003 PHARM REV CODE 250: Performed by: PHYSICIAN ASSISTANT

## 2019-08-20 PROCEDURE — 99232 SBSQ HOSP IP/OBS MODERATE 35: CPT | Mod: GC,,, | Performed by: INTERNAL MEDICINE

## 2019-08-20 PROCEDURE — 85025 COMPLETE CBC W/AUTO DIFF WBC: CPT

## 2019-08-20 PROCEDURE — 83735 ASSAY OF MAGNESIUM: CPT

## 2019-08-20 PROCEDURE — 80048 BASIC METABOLIC PNL TOTAL CA: CPT

## 2019-08-20 PROCEDURE — 99900035 HC TECH TIME PER 15 MIN (STAT)

## 2019-08-20 PROCEDURE — 27000221 HC OXYGEN, UP TO 24 HOURS

## 2019-08-20 PROCEDURE — 84100 ASSAY OF PHOSPHORUS: CPT

## 2019-08-20 PROCEDURE — 99232 PR SUBSEQUENT HOSPITAL CARE,LEVL II: ICD-10-PCS | Mod: GC,,, | Performed by: INTERNAL MEDICINE

## 2019-08-20 PROCEDURE — 63600175 PHARM REV CODE 636 W HCPCS: Performed by: PHYSICIAN ASSISTANT

## 2019-08-20 PROCEDURE — 36415 COLL VENOUS BLD VENIPUNCTURE: CPT

## 2019-08-20 PROCEDURE — 97116 GAIT TRAINING THERAPY: CPT

## 2019-08-20 PROCEDURE — 94761 N-INVAS EAR/PLS OXIMETRY MLT: CPT

## 2019-08-20 RX ADMIN — INSULIN DETEMIR 12 UNITS: 100 INJECTION, SOLUTION SUBCUTANEOUS at 09:08

## 2019-08-20 RX ADMIN — PANTOPRAZOLE SODIUM 40 MG: 40 TABLET, DELAYED RELEASE ORAL at 05:08

## 2019-08-20 RX ADMIN — INSULIN ASPART 2 UNITS: 100 INJECTION, SOLUTION INTRAVENOUS; SUBCUTANEOUS at 04:08

## 2019-08-20 RX ADMIN — LOSARTAN POTASSIUM 50 MG: 50 TABLET, FILM COATED ORAL at 08:08

## 2019-08-20 RX ADMIN — POTASSIUM CHLORIDE 20 MEQ: 20 TABLET, EXTENDED RELEASE ORAL at 08:08

## 2019-08-20 RX ADMIN — FUROSEMIDE 20 MG: 10 INJECTION, SOLUTION INTRAMUSCULAR; INTRAVENOUS at 06:08

## 2019-08-20 RX ADMIN — METOPROLOL TARTRATE 75 MG: 50 TABLET ORAL at 08:08

## 2019-08-20 RX ADMIN — INSULIN ASPART 4 UNITS: 100 INJECTION, SOLUTION INTRAVENOUS; SUBCUTANEOUS at 12:08

## 2019-08-20 RX ADMIN — ASPIRIN 325 MG: 325 TABLET, DELAYED RELEASE ORAL at 08:08

## 2019-08-20 RX ADMIN — AMIODARONE HYDROCHLORIDE 400 MG: 200 TABLET ORAL at 08:08

## 2019-08-20 RX ADMIN — INSULIN ASPART 10 UNITS: 100 INJECTION, SOLUTION INTRAVENOUS; SUBCUTANEOUS at 12:08

## 2019-08-20 RX ADMIN — ATORVASTATIN CALCIUM 40 MG: 20 TABLET, FILM COATED ORAL at 09:08

## 2019-08-20 RX ADMIN — FUROSEMIDE 20 MG: 10 INJECTION, SOLUTION INTRAMUSCULAR; INTRAVENOUS at 08:08

## 2019-08-20 RX ADMIN — POTASSIUM CHLORIDE 20 MEQ: 20 TABLET, EXTENDED RELEASE ORAL at 09:08

## 2019-08-20 RX ADMIN — HEPARIN SODIUM 5000 UNITS: 5000 INJECTION, SOLUTION INTRAVENOUS; SUBCUTANEOUS at 05:08

## 2019-08-20 RX ADMIN — HEPARIN SODIUM 5000 UNITS: 5000 INJECTION, SOLUTION INTRAVENOUS; SUBCUTANEOUS at 09:08

## 2019-08-20 RX ADMIN — METOPROLOL TARTRATE 75 MG: 50 TABLET ORAL at 09:08

## 2019-08-20 RX ADMIN — INSULIN ASPART 10 UNITS: 100 INJECTION, SOLUTION INTRAVENOUS; SUBCUTANEOUS at 08:08

## 2019-08-20 RX ADMIN — AMIODARONE HYDROCHLORIDE 400 MG: 200 TABLET ORAL at 09:08

## 2019-08-20 RX ADMIN — INSULIN ASPART 10 UNITS: 100 INJECTION, SOLUTION INTRAVENOUS; SUBCUTANEOUS at 04:08

## 2019-08-20 RX ADMIN — HEPARIN SODIUM 5000 UNITS: 5000 INJECTION, SOLUTION INTRAVENOUS; SUBCUTANEOUS at 02:08

## 2019-08-20 RX ADMIN — METOPROLOL TARTRATE 75 MG: 50 TABLET ORAL at 02:08

## 2019-08-20 RX ADMIN — DOCUSATE SODIUM 100 MG: 100 CAPSULE, LIQUID FILLED ORAL at 09:08

## 2019-08-20 RX ADMIN — HYDRALAZINE HYDROCHLORIDE 10 MG: 20 INJECTION INTRAMUSCULAR; INTRAVENOUS at 05:08

## 2019-08-20 NOTE — PROGRESS NOTES
"[facility]  Endocrinology  Progress Note    Admit Date: 2019     Reason for Consult: Management of T2DM, Hyperglycemia     Surgical Procedure and Date: CABG 19    Diabetes diagnosis year:     Lab Results   Component Value Date    HGBA1C 7.5 (H) 2019       Home Diabetes Medications: Levemir 8 q HS, Glipizide 20 mg daily, Trulicity 1.5 mg SQ once weekly    How often checking glucose at home?  Once daily   BG readings on regimen: 100s  Hypoglycemia on the regimen?  no  Missed doses on regimen? no    Diabetes Complications include:     none  Complicating diabetes co morbidities:   History of MI, KAMRAN and CKD      HPI:   Patient is a 74 y.o. female with a diagnosis of DM2, KAMRAN, CKD, and CAD, who is s/p CABG on 19. Last seen in [facility] endocrinology clinic for DM by MAURICIO Pritchard NP on 19.  Positive family history of DM (father).  Endocrinology consulted for DM/BG management.          Interval HPI: Bgs out of goal yesterday but improved this morning with insulin changes.  Overnight events: None  Eatin%  Nausea: No  Hypoglycemia and intervention: No  Fever: No  TPN and/or TF: No    /64   Pulse 75   Temp 98.2 °F (36.8 °C)   Resp 18   Ht 5' 2" (1.575 m)   Wt 102.6 kg (226 lb 3.2 oz)   LMP 2001 (Approximate)   SpO2 99%   Breastfeeding? No   BMI 41.37 kg/m²      Labs Reviewed and Include    Recent Labs   Lab 19  0551   *   CALCIUM 10.0      K 4.4   CO2 23      BUN 27*   CREATININE 1.3     Lab Results   Component Value Date    WBC 9.50 2019    HGB 8.8 (L) 2019    HCT 28.2 (L) 2019    MCV 88 2019     2019     No results for input(s): TSH, FREET4 in the last 168 hours.  Lab Results   Component Value Date    HGBA1C 7.5 (H) 2019       Nutritional status:   Body mass index is 41.37 kg/m².  Lab Results   Component Value Date    ALBUMIN 3.3 (L) 2019    ALBUMIN 3.3 (L) 2019    ALBUMIN 3.0 " (L) 07/09/2019     No results found for: PREALBUMIN    Estimated Creatinine Clearance: 43.9 mL/min (based on SCr of 1.3 mg/dL).    Accu-Checks  Recent Labs     08/17/19  2149 08/18/19  0736 08/18/19  1139 08/18/19  1723 08/18/19  2154 08/19/19  0815 08/19/19  1145 08/19/19  1646 08/19/19  2046 08/20/19  0848   POCTGLUCOSE 229* 168* 256* 160* 213* 235* 261* 247* 235* 175*       Current Medications and/or Treatments Impacting Glycemic Control  Immunotherapy:    Immunosuppressants     None        Steroids:   Hormones (From admission, onward)    None        Pressors:    Autonomic Drugs (From admission, onward)    None        Hyperglycemia/Diabetes Medications:   Antihyperglycemics (From admission, onward)    Start     Stop Route Frequency Ordered    08/20/19 0715  insulin aspart U-100 pen 10 Units      -- SubQ 3 times daily with meals 08/19/19 1703    08/19/19 2100  insulin detemir U-100 pen 12 Units      -- SubQ Nightly 08/19/19 0837    08/16/19 0908  insulin aspart U-100 pen 0-5 Units      -- SubQ Before meals & nightly PRN 08/16/19 0809    08/13/19 0945  insulin regular (Humulin R) 100 Units in sodium chloride 0.9% 100 mL infusion      08/14 2100 IV Continuous 08/13/19 0837          ASSESSMENT and PLAN    * S/P CABG (coronary artery bypass graft)  S/p CABG on 8/12/19  Managed per primary team  Avoid hypoglycemia  Optimize BG control for surgical wound healing        Type 2 diabetes mellitus with stage 3 chronic kidney disease, without long-term current use of insulin  BGs not at goal yesterday. Insulin adjusted, this morning AM BG at goal. Will continue to monitor on current regimen and adjust as needed.    Plan:  - Continue Levemir 12 units HS  - Continue Novolog 10 U AC  - BG monitoring AC/HS and low dose correction scale coverage.       CKD (chronic kidney disease) stage 3, GFR 30-59 ml/min  Titrate insulin slowly to avoid hypoglycemia as the risk of hypoglycemia increases with decreased creatinine  clearance.  Caution with insulin stacking  Estimated Creatinine Clearance: 43.9 mL/min (based on SCr of 1.3 mg/dL).        Erickson Delgado MD  Endocrinology  [facility]

## 2019-08-20 NOTE — HPI
This is a 74-year-old woman who was transferred   after a non-STEMI.  I saw her in the hospital.  She followed up in the clinic.    She was having unstable angina, so we moved her surgery up.  She had anemia of   unknown origin.  No GI bleeding per history.  She also had insulin-dependent   diabetes.  I discussed the risks and benefits of the procedure.  She was able to   give informed consent.

## 2019-08-20 NOTE — PLAN OF CARE
Problem: Adult Inpatient Plan of Care  Goal: Plan of Care Review  8/12: Admit to SICU s/p CABG x 3. Levo on. 500 albumin. Propofol d/c post-op. 1 amp bicarb.  Insulin and cardene started  8/13: levo off.    Outcome: Ongoing (interventions implemented as appropriate)  A A O x 4. Free of falls, traumas and injuries. CAGB post op day 8. MSI SRINIVASAN, L graft site. Cleaned with soap and water today. BG monitored ACHS. Titrated off of O2 today, satting at 98%. Denies shortness of breath, chest pain, nausea, vomiting. Plan of care reviewed with patient. Vital signs stable. Pain monitored. Will continue to monitor.

## 2019-08-20 NOTE — SUBJECTIVE & OBJECTIVE
Interval History: NAEON. Pt encouraged to walk and will be d/c to home tomorrow. NSR on monitor    Review of Systems   Constitution: Positive for malaise/fatigue.   Cardiovascular: Negative for chest pain, claudication, dyspnea on exertion, irregular heartbeat, leg swelling and palpitations.   Respiratory: Negative for cough and shortness of breath.    Hematologic/Lymphatic: Negative for bleeding problem.   Gastrointestinal: Negative for abdominal pain.   Genitourinary: Negative for dysuria.   Neurological: Negative for headaches and weakness.     Medications:  Continuous Infusions:  Scheduled Meds:   amiodarone  400 mg Oral BID    aspirin  325 mg Oral Daily    atorvastatin  40 mg Oral QHS    docusate sodium  100 mg Oral BID    furosemide  20 mg Intravenous BID    heparin (porcine)  5,000 Units Subcutaneous Q8H    insulin aspart U-100  10 Units Subcutaneous TIDWM    insulin detemir U-100  12 Units Subcutaneous QHS    losartan  50 mg Oral Daily    metoprolol tartrate  75 mg Oral TID    pantoprazole  40 mg Oral Before breakfast    polyethylene glycol  17 g Oral Daily    potassium chloride  20 mEq Oral BID     PRN Meds:acetaminophen, bisacodyl, Dextrose 10% Bolus, Dextrose 10% Bolus, glucagon (human recombinant), glucose, glucose, hydrALAZINE, insulin aspart U-100, metoclopramide HCl, metoprolol, ondansetron, oxyCODONE, oxyCODONE, sodium chloride 0.9%     Objective:     Vital Signs (Most Recent):  Temp: 98.2 °F (36.8 °C) (08/20/19 0745)  Pulse: 75 (08/20/19 0745)  Resp: 18 (08/20/19 0745)  BP: 129/64 (08/20/19 0745)  SpO2: 99 % (08/20/19 0745) Vital Signs (24h Range):  Temp:  [96.3 °F (35.7 °C)-98.3 °F (36.8 °C)] 98.2 °F (36.8 °C)  Pulse:  [64-77] 75  Resp:  [15-22] 18  SpO2:  [96 %-99 %] 99 %  BP: (100-147)/(56-70) 129/64     Weight: 102.6 kg (226 lb 3.2 oz)  Body mass index is 41.37 kg/m².    SpO2: 99 %  O2 Device (Oxygen Therapy): CPAP    Intake/Output - Last 3 Shifts       08/18 0700 - 08/19 0659 08/19  0700 - 08/20 0659 08/20 0700 - 08/21 0659    P.O. 240  360    Total Intake(mL/kg) 240 (2.3)  360 (3.5)    Urine (mL/kg/hr)  300 (0.1) 600 (1.4)    Stool       Chest Tube       Total Output  300 600    Net +240 -300 -240           Urine Occurrence 1 x            Lines/Drains/Airways     Peripheral Intravenous Line                 Peripheral IV - Single Lumen 07/10/19 1324 22 G Anterior;Left Forearm 40 days         Peripheral IV - Single Lumen 08/16/19 0955 20 G;1 3/4 in Left Upper Arm 4 days                Physical Exam   Constitutional: She is oriented to person, place, and time. She appears well-developed and well-nourished.   Cardiovascular: Normal rate, regular rhythm and normal heart sounds.   Pulmonary/Chest: Effort normal and breath sounds normal.   Abdominal: Soft.   Neurological: She is alert and oriented to person, place, and time.   Skin: Skin is warm, dry and intact.   Psychiatric: She has a normal mood and affect.       Significant Labs:  BMP:   Recent Labs   Lab 08/20/19  0551   *      K 4.4      CO2 23   BUN 27*   CREATININE 1.3   CALCIUM 10.0   MG 1.8     CBC:   Recent Labs   Lab 08/20/19  0551   WBC 9.50   RBC 3.21*   HGB 8.8*   HCT 28.2*      MCV 88   MCH 27.4   MCHC 31.2*       Significant Diagnostics:  I have reviewed all pertinent imaging results/findings within the past 24 hours.

## 2019-08-20 NOTE — PLAN OF CARE
Problem: Physical Therapy Goal  Goal: Physical Therapy Goal  Goals to be met by: 2019    Patient will increase functional independence with mobility by performin. Supine <> sit with Contact Guard Assistance.  2. Sit <> stand transfer with Contact Guard Assistance using No Assistive Device.- met 2019  Sit <> stand transfer with supervision using no assistive device- met 2019  3. Bed <> chair transfer via Stand Pivot with Contact Guard Assistance using No Assistive Device.- met 2019  4. Gait  x 150 feet with Contact Guard Assistance using No Assistive Device to prepare for community ambulation and endurance activities.  5. Dynamic standing for 8 minutes with Contact Guard Assistance using Rolling Walker to prepare for functional tasks in standing.  6. Able to tolerate exercise for 15-20 reps with independence.  7. Patient is able to recall 3/3 sternal precautions without assistance.         Outcome: Ongoing (interventions implemented as appropriate)  Pt is progressing toward goals. All goals remain appropriate.

## 2019-08-20 NOTE — PLAN OF CARE
Problem: Adult Inpatient Plan of Care  Goal: Plan of Care Review  8/12: Admit to SICU s/p CABG x 3. Levo on. 500 albumin. Propofol d/c post-op. 1 amp bicarb.  Insulin and cardene started  8/13: levo off.    Outcome: Ongoing (interventions implemented as appropriate)  A A O x 4. Free of falls, traumas and injuries. MSI SRINIVASAN, L graft site. BG monitored ACHS. Patient titrated Denies shortness of breath, chest pain, nausea, vomiting. Plan of care reviewed with patient. Vital signs stable. Pain monitored. Will continue to monitor.

## 2019-08-20 NOTE — PROGRESS NOTES
[facility]  Cardiothoracic Surgery  Progress Note    Patient Name: Kaylee Romero  MRN: [id number]  Admission Date: 8/12/2019  Hospital Length of Stay: 8 days  Code Status: Prior   Attending Physician: Peterson Ventura MD   Referring Provider: Peterson Ventura MD  Principal Problem:S/P CABG (coronary artery bypass graft)      Subjective:     Post-Op Info:  Procedure(s) (LRB):  CORONARY ARTERY BYPASS GRAFT (CABG)X3 (N/A)  SURGICAL PROCUREMENT, VEIN, ENDOSCOPIC (Left)   8 Days Post-Op     Interval History: NAEON. Pt encouraged to walk and will be d/c to home tomorrow. NSR on monitor    Review of Systems   Constitution: Positive for malaise/fatigue.   Cardiovascular: Negative for chest pain, claudication, dyspnea on exertion, irregular heartbeat, leg swelling and palpitations.   Respiratory: Negative for cough and shortness of breath.    Hematologic/Lymphatic: Negative for bleeding problem.   Gastrointestinal: Negative for abdominal pain.   Genitourinary: Negative for dysuria.   Neurological: Negative for headaches and weakness.     Medications:  Continuous Infusions:  Scheduled Meds:   amiodarone  400 mg Oral BID    aspirin  325 mg Oral Daily    atorvastatin  40 mg Oral QHS    docusate sodium  100 mg Oral BID    furosemide  20 mg Intravenous BID    heparin (porcine)  5,000 Units Subcutaneous Q8H    insulin aspart U-100  10 Units Subcutaneous TIDWM    insulin detemir U-100  12 Units Subcutaneous QHS    losartan  50 mg Oral Daily    metoprolol tartrate  75 mg Oral TID    pantoprazole  40 mg Oral Before breakfast    polyethylene glycol  17 g Oral Daily    potassium chloride  20 mEq Oral BID     PRN Meds:acetaminophen, bisacodyl, Dextrose 10% Bolus, Dextrose 10% Bolus, glucagon (human recombinant), glucose, glucose, hydrALAZINE, insulin aspart U-100, metoclopramide HCl, metoprolol, ondansetron, oxyCODONE, oxyCODONE, sodium chloride 0.9%     Objective:     Vital Signs (Most  Recent):  Temp: 98.2 °F (36.8 °C) (08/20/19 0745)  Pulse: 75 (08/20/19 0745)  Resp: 18 (08/20/19 0745)  BP: 129/64 (08/20/19 0745)  SpO2: 99 % (08/20/19 0745) Vital Signs (24h Range):  Temp:  [96.3 °F (35.7 °C)-98.3 °F (36.8 °C)] 98.2 °F (36.8 °C)  Pulse:  [64-77] 75  Resp:  [15-22] 18  SpO2:  [96 %-99 %] 99 %  BP: (100-147)/(56-70) 129/64     Weight: 102.6 kg (226 lb 3.2 oz)  Body mass index is 41.37 kg/m².    SpO2: 99 %  O2 Device (Oxygen Therapy): CPAP    Intake/Output - Last 3 Shifts       08/18 0700 - 08/19 0659 08/19 0700 - 08/20 0659 08/20 0700 - 08/21 0659    P.O. 240  360    Total Intake(mL/kg) 240 (2.3)  360 (3.5)    Urine (mL/kg/hr)  300 (0.1) 600 (1.4)    Stool       Chest Tube       Total Output  300 600    Net +240 -300 -240           Urine Occurrence 1 x            Lines/Drains/Airways     Peripheral Intravenous Line                 Peripheral IV - Single Lumen 07/10/19 1324 22 G Anterior;Left Forearm 40 days         Peripheral IV - Single Lumen 08/16/19 0955 20 G;1 3/4 in Left Upper Arm 4 days                Physical Exam   Constitutional: She is oriented to person, place, and time. She appears well-developed and well-nourished.   Cardiovascular: Normal rate, regular rhythm and normal heart sounds.   Pulmonary/Chest: Effort normal and breath sounds normal.   Abdominal: Soft.   Neurological: She is alert and oriented to person, place, and time.   Skin: Skin is warm, dry and intact.   Psychiatric: She has a normal mood and affect.       Significant Labs:  BMP:   Recent Labs   Lab 08/20/19  0551   *      K 4.4      CO2 23   BUN 27*   CREATININE 1.3   CALCIUM 10.0   MG 1.8     CBC:   Recent Labs   Lab 08/20/19  0551   WBC 9.50   RBC 3.21*   HGB 8.8*   HCT 28.2*      MCV 88   MCH 27.4   MCHC 31.2*       Significant Diagnostics:  I have reviewed all pertinent imaging results/findings within the past 24 hours.    Assessment/Plan:     * S/P CABG (coronary artery bypass graft)  S/P  CABG (coronary artery bypass graft)  Patient is a 73yo female s/p CABG x2 with Dr. Ventura on 8/12/19. PMH significant for CAD, HTN, hx TIA.        - Analgesia: oxycodone PRN  - History of TIA, no acute findings  - On room air   - Respiratory treatment q4h  - IS  - MAP goals 60-80  - Monitor UOP with Strict I&Os  - Trend BUN/Cr  - Lasix 20 BID IV   - replete electrolytes prn  - DVT: SCDs, heparin subq  - GI: Famotidine BID  - Metop 75 BID  - Losartan 50  - continue PO Amiodarone    Hypophosphatemia  Resolved     Acute blood loss anemia  - CBC daily  - Hb/Hct stable 8.6/27.3    Type 2 diabetes mellitus with stage 3 chronic kidney disease, without long-term current use of insulin  Endocrine following     CKD (chronic kidney disease) stage 3, GFR 30-59 ml/min  Creatinine 1.3   Daily labs     KAMRAN on CPAP  CPAP at night     Morbid obesity with body mass index (BMI) of 40.0 or higher  Cardiac diet   PT/OT        Keila Warner NP  Cardiothoracic Surgery  [facility]

## 2019-08-20 NOTE — CARE UPDATE
Rapid Response Respiratory Chart Check     Chart check completed, pt wore CPAP QHS. RT, Terence contacted, no concerns verbalized at this time, instructed to call [phone] for further concerns or assistance.

## 2019-08-20 NOTE — PLAN OF CARE
Problem: Adult Inpatient Plan of Care  Goal: Plan of Care Review  8/12: Admit to SICU s/p CABG x 3. Levo on. 500 albumin. Propofol d/c post-op. 1 amp bicarb.  Insulin and cardene started  8/13: levo off.    Outcome: Ongoing (interventions implemented as appropriate)  Pt ambulated(stairs) with OT/PT.  Cont on IV lasix.  Will cont to monitor.

## 2019-08-20 NOTE — ASSESSMENT & PLAN NOTE
- CBC daily  - Hb/Hct stable 8.1/26.1  
- CBC daily  - Hb/Hct stable 8.2/26  
- CBC daily  - Hb/Hct stable 8.5/27.2  
- CBC daily  - Hb/Hct stable 8.6/27.3  
BG goal 140 - 180     Change to transition insulin infusion at 0.5 u/hr.   BG monitoring ac/hs/0200 and low dose correction scale coverage.           Discharge planning: TBD      
BG goal 140 - 180     Discontinue CTS protocol due to advanced age  Convert to standard intensive IV insulin infusion protocol  Requires hourly BG monitoring while on protocol     Discharge planning: MALINDA      
BG goal 140 - 180     continue transition insulin infusion at 0.5 u/hr; discontinue IV insulin tonight and start levemir 8 units HS.   BG monitoring ac/hs/0200 and low dose correction scale coverage.   Start novolog 4 units with meals.           Discharge planning: TBD      
BG goal 140 - 180     increase levemir 10 units HS.   BG monitoring ac/hs and low dose correction scale coverage.   increase novolog 5 units with meals.           Discharge planning: TBD      
BG goal 140 - 180     increase levemir 12 units HS.   BG monitoring ac/hs and low dose correction scale coverage.   increase novolog 8 units with meals.           Discharge planning: Likely resume home regimen. Levemir dose TBD.       
BGs not at goal yesterday. Insulin adjusted, this morning AM BG at goal. Will continue to monitor on current regimen and adjust as needed.    Plan:  - Continue Levemir 12 units HS  - Continue Novolog 10 U AC  - BG monitoring AC/HS and low dose correction scale coverage.           Discharge planning: Likely resume home regimen. Levemir dose TBD.       
CPAP at night   
Cardiac diet   PT/OT  
Creatinine 1.2 from 1.1  Daily labs   
Creatinine 1.3 from 1.2  Daily labs   
Creatinine stable at 1.0  
Creatinine stable at 1.1  
Endocrine following   
Patient is a 75yo female s/p CABG x2 with Dr. Ventura on 8/12/19. PMH significnat for CAD, HTN, hx TIA.     Neuro:   - Sedation: none   - Analgesia: oxycodone and dilaudid prn  - History of TIA, no acute findings    Pulmonary:   - Extubated on 4L NC  - Respiratory treatment q4h  - IS  - ABG prn and daily    Cardiac:   - Pressors: none  - Cardene for tight control of Bps  - MAP goals 60-80  - Continue to monitor CT output - plan to d/c when output <150cc/24h    Renal:   - Monitor UOP with Strict I&Os  - Trend BUN/Cr  - Guevara catheter in place    FEN/GI:  - NPO, sips with meds  - mIVF: NaCl @10cc/hr  - replete electrolytes prn  - BMP daily    Heme/ID:  - CBC daily  - Hb/Hct stable  - Antibiotics:perioperative clindamycin  - Cultures: none     Endo:   - Insulin gtt  - trend POCT glucose  - BGL goal 140-180    Prophylaxis:  - DVT: SCDs, chemical prophylaxis (holding today for sternotomy)  - GI: Famotidine BID    Dispo:   - Continue SICU Care    
Patient is a 75yo female s/p CABG x2 with Dr. Ventura on 8/12/19. PMH significnat for CAD, HTN, hx TIA.     Neuro:   - Sedation: none   - Analgesia: oxycodone and dilaudid prn  - History of TIA, no acute findings    Pulmonary:   - Extubated on 4L NC  - Respiratory treatment q4h  - IS  - ABG prn and daily    Cardiac:   - Pressors: none  - Transition from cardene to hydralazine PRN for MAP<80 and SBP<140  - MAP goals 60-80  - Continue to monitor CT output - plan to d/c when output <150cc/24h    Renal:   - Monitor UOP with Strict I&Os  - Trend BUN/Cr  - Guevara catheter in place  - Potential diuresis later today - will re-evaluate fluid status/hemodynamics this afternoon    FEN/GI:  - NPO, sips with meds  - mIVF: NaCl @10cc/hr  - replete electrolytes prn  - BMP daily    Heme/ID:  - CBC daily  - Hb/Hct stable  - Antibiotics: perioperative clindamycin  - Cultures: none     Endo:   - Insulin gtt  - trend POCT glucose  - BGL goal 140-180    Prophylaxis:  - DVT: SCDs, heparin subq  - GI: Famotidine BID    Dispo:   - Continue SICU Care  - Potential stepdown to CTSU later today    
Phos 1.9  Replaced IV  
Phos 2.1  Replaced IV again   
Phos 2.3  K phos     
Phos 3.3  K phos PRN    
S/P CABG (coronary artery bypass graft)  Patient is a 73yo female s/p CABG x2 with Dr. Ventura on 8/12/19. PMH significant for CAD, HTN, hx TIA.        - Analgesia: oxycodone PRN  - History of TIA, no acute findings  - 1L NC - wean as tolerated   - Respiratory treatment q4h  - IS  - MAP goals 60-80  - Monitor UOP with Strict I&Os  - Trend BUN/Cr  - Guevara catheter D/C  - Lasix 20 BID IV   - replete electrolytes prn  - DVT: SCDs, heparin subq  - GI: Famotidine BID  - Metop 75 BID  - IV hydralazine and Metop PRN   - Losartan 50  - continue PO Amiodarone  
S/P CABG (coronary artery bypass graft)  Patient is a 73yo female s/p CABG x2 with Dr. Ventura on 8/12/19. PMH significant for CAD, HTN, hx TIA.        - Analgesia: oxycodone PRN  - History of TIA, no acute findings  - 2L NC - wean as tolerated   - Respiratory treatment q4h  - IS  - MAP goals 60-80  - d/c CT today and obtain CXR  - Monitor UOP with Strict I&Os  - Trend BUN/Cr  - Guevara catheter D/C  - Lasix 20 BID IV   - replete electrolytes prn  - DVT: SCDs, heparin subq  - GI: Famotidine BID  - Metop increased to 75 BID  - IV hydralazine and Metop PRN   - Losartan 50  - Start PO Amiodarone for afib on monitor   
S/P CABG (coronary artery bypass graft)  Patient is a 73yo female s/p CABG x2 with Dr. Ventura on 8/12/19. PMH significant for CAD, HTN, hx TIA.        - Analgesia: oxycodone PRN  - History of TIA, no acute findings  - On room air this AM  - Respiratory treatment q4h  - IS  - MAP goals 60-80  - Monitor UOP with Strict I&Os  - Trend BUN/Cr  - Guevara catheter D/C  - Lasix 20 BID IV   - replete electrolytes prn  - DVT: SCDs, heparin subq  - GI: Famotidine BID  - Metop 75 BID  - IV hydralazine and Metop PRN   - Losartan 50  - continue PO Amiodarone  
S/P CABG (coronary artery bypass graft)  Patient is a 75yo female s/p CABG x2 with Dr. Ventura on 8/12/19. PMH significnat for CAD, HTN, hx TIA.        - Analgesia: oxycodone PRN  - History of TIA, no acute findings  - 2L NC - wean as tolerated   - Respiratory treatment q4h  - IS  - MAP goals 60-80  - Will D/C pleural CT 8/14  - Pleural CT remain 170cc in 24 hours   - Monitor UOP with Strict I&Os  - Trend BUN/Cr  - Guevara catheter D/C  - Lasix 20 BID IV   - replete electrolytes prn  - DVT: SCDs, heparin subq  - GI: Famotidine BID   - Metop increased to 75 BID  - IV hydralazine and Metop PRN   - Losartan 50  - Start PO Amiodarone for afib on monitor   
S/P CABG (coronary artery bypass graft)  Patient is a 75yo female s/p CABG x2 with Dr. Ventura on 8/12/19. PMH significnat for CAD, HTN, hx TIA.        - Analgesia: oxycodone PRN  - History of TIA, no acute findings  - 3L NC  - Respiratory treatment q4h  - IS  - MAP goals 60-80  - Will D/C pleural CT and keep Meds   - Monitor UOP with Strict I&Os  - Trend BUN/Cr  - Guevara catheter D/C  - Start Lasix 20 BID IV   - replete electrolytes prn  - DVT: SCDs, heparin subq  - GI: Famotidine BID   - Metop increased to 25 for   - Start Losartan 25 for   
S/P CABG (coronary artery bypass graft)  Patient is a 75yo female s/p CABG x2 with Dr. Ventura on 8/12/19. PMH significnat for CAD, HTN, hx TIA.        - Analgesia: oxycodone PRN  - History of TIA, no acute findings  - 4L NC - wean as tolerated   - Respiratory treatment q4h  - IS  - MAP goals 60-80  - Will D/C pleural CT 8/14  - Pleural CT remain   - Monitor UOP with Strict I&Os  - Trend BUN/Cr  - Guevara catheter D/C  - Start Lasix 20 BID IV   - replete electrolytes prn  - DVT: SCDs, heparin subq  - GI: Famotidine BID   - Metop increased to 75 for HR 100s  - IV hydralazine given yesterday   - Increase losartan to 50  
S/p CABG on 8/12/19  Managed per primary team  Avoid hypoglycemia  Optimize BG control for surgical wound healing      
Titrate insulin slowly to avoid hypoglycemia as the risk of hypoglycemia increases with decreased creatinine clearance.  Caution with insulin stacking  Estimated Creatinine Clearance: 43.9 mL/min (based on SCr of 1.3 mg/dL).      
Titrate insulin slowly to avoid hypoglycemia as the risk of hypoglycemia increases with decreased creatinine clearance.  Caution with insulin stacking  Estimated Creatinine Clearance: 47.9 mL/min (based on SCr of 1.2 mg/dL).      
Titrate insulin slowly to avoid hypoglycemia as the risk of hypoglycemia increases with decreased creatinine clearance.  Caution with insulin stacking  Estimated Creatinine Clearance: 48.1 mL/min (based on SCr of 1.2 mg/dL).      
Titrate insulin slowly to avoid hypoglycemia as the risk of hypoglycemia increases with decreased creatinine clearance.  Caution with insulin stacking  Estimated Creatinine Clearance: 58.7 mL/min (based on SCr of 1 mg/dL).      
Titrate insulin slowly to avoid hypoglycemia as the risk of hypoglycemia increases with decreased creatinine clearance.  Caution with insulin stacking  Estimated Creatinine Clearance: 67.1 mL/min (based on SCr of 0.9 mg/dL).      
Titrate insulin slowly to avoid hypoglycemia as the risk of hypoglycemia increases with decreased creatinine clearance.  Caution with insulin stacking  Estimated Creatinine Clearance: 67.1 mL/min (based on SCr of 0.9 mg/dL).      
may contribute to insulin resistance  Body mass index is 41.88 kg/m².      
may contribute to insulin resistance  Body mass index is 42.14 kg/m².      
may contribute to insulin resistance  Body mass index is 43.35 kg/m².      
may contribute to insulin resistance  Body mass index is 45.54 kg/m².      
may contribute to insulin resistance  Body mass index is 45.54 kg/m².      
166.8

## 2019-08-20 NOTE — SUBJECTIVE & OBJECTIVE
"Interval HPI: Bgs out of goal yesterday but improved this morning with insulin changes.  Overnight events: None  Eatin%  Nausea: No  Hypoglycemia and intervention: No  Fever: No  TPN and/or TF: No    /64   Pulse 75   Temp 98.2 °F (36.8 °C)   Resp 18   Ht 5' 2" (1.575 m)   Wt 102.6 kg (226 lb 3.2 oz)   LMP 2001 (Approximate)   SpO2 99%   Breastfeeding? No   BMI 41.37 kg/m²     Labs Reviewed and Include    Recent Labs   Lab 19  0551   *   CALCIUM 10.0      K 4.4   CO2 23      BUN 27*   CREATININE 1.3     Lab Results   Component Value Date    WBC 9.50 2019    HGB 8.8 (L) 2019    HCT 28.2 (L) 2019    MCV 88 2019     2019     No results for input(s): TSH, FREET4 in the last 168 hours.  Lab Results   Component Value Date    HGBA1C 7.5 (H) 2019       Nutritional status:   Body mass index is 41.37 kg/m².  Lab Results   Component Value Date    ALBUMIN 3.3 (L) 2019    ALBUMIN 3.3 (L) 2019    ALBUMIN 3.0 (L) 2019     No results found for: PREALBUMIN    Estimated Creatinine Clearance: 43.9 mL/min (based on SCr of 1.3 mg/dL).    Accu-Checks  Recent Labs     19  2149 19  0736 19  1139 19  1723 19  2154 19  0815 19  1145 19  1646 19  2046 19  0848   POCTGLUCOSE 229* 168* 256* 160* 213* 235* 261* 247* 235* 175*       Current Medications and/or Treatments Impacting Glycemic Control  Immunotherapy:    Immunosuppressants     None        Steroids:   Hormones (From admission, onward)    None        Pressors:    Autonomic Drugs (From admission, onward)    None        Hyperglycemia/Diabetes Medications:   Antihyperglycemics (From admission, onward)    Start     Stop Route Frequency Ordered    19 0715  insulin aspart U-100 pen 10 Units      -- SubQ 3 times daily with meals 19 1703    19 2100  insulin detemir U-100 pen 12 Units      -- SubQ Nightly " 08/19/19 0837    08/16/19 0908  insulin aspart U-100 pen 0-5 Units      -- SubQ Before meals & nightly PRN 08/16/19 0809    08/13/19 0945  insulin regular (Humulin R) 100 Units in sodium chloride 0.9% 100 mL infusion      08/14 2100 IV Continuous 08/13/19 0837

## 2019-08-20 NOTE — PT/OT/SLP PROGRESS
Physical Therapy Treatment    Patient Name:  Kaylee Romero   MRN:  [id number]    Recommendations:     Discharge Recommendations:  home with home health   Discharge Equipment Recommendations: none   Barriers to discharge: None    Assessment:     Kaylee Romero is a 74 y.o. female admitted with a medical diagnosis of S/P CABG (coronary artery bypass graft).  She presents with the following impairments/functional limitations:  weakness, impaired endurance, impaired functional mobilty, gait instability, impaired balance, impaired cardiopulmonary response to activity. Pt tolerated activity with improved mobility reflected by pt ability to ascend/descend 4 stairs with contact guard assistance. Pt is progressing well toward goals, encouraged to ambulate daily with assistance/supervision from nursing/therapy. pt would continue to benefit from acute skilled therapy intervention to address deficits and progress toward prior level of function.       Rehab Prognosis: Good; patient would benefit from acute skilled PT services to address these deficits and reach maximum level of function.    Recent Surgery: Procedure(s) (LRB):  CORONARY ARTERY BYPASS GRAFT (CABG)X3 (N/A)  SURGICAL PROCUREMENT, VEIN, ENDOSCOPIC (Left) 8 Days Post-Op    Plan:     During this hospitalization, patient to be seen 3 x/week to address the identified rehab impairments via gait training, therapeutic activities, therapeutic exercises, neuromuscular re-education and progress toward the following goals:    · Plan of Care Expires:  09/12/19    Subjective     Chief Complaint: Pt with no complaints at this time   Patient/Family Comments/goals: to get better and return home   Pain/Comfort:  · Pain Rating 1: (Pt did not report)      Objective:     Communicated with RN prior to session.  Patient found up in chair with telemetry upon PT entry to room.     General Precautions: Standard, fall, sternal   Orthopedic Precautions:N/A   Braces: N/A      Functional  Mobility:  · Transfers:     · Sit to Stand:  supervision with no AD  · Gait: Pt ambulated 2x 20 feet to and from stairwell with no AD and supervision. Pt with no LOB, no SOB, no dizziness.   · Stairs:  Pt ascended/descended 4  stair(s) with No Assistive Device with right handrail with Contact Guard Assistance. Pt with step to pattern, no LOB, no SOB, no dizziness.       AM-PAC 6 CLICK MOBILITY  Turning over in bed (including adjusting bedclothes, sheets and blankets)?: 4  Sitting down on and standing up from a chair with arms (e.g., wheelchair, bedside commode, etc.): 4  Moving from lying on back to sitting on the side of the bed?: 3  Moving to and from a bed to a chair (including a wheelchair)?: 4  Need to walk in hospital room?: 4  Climbing 3-5 steps with a railing?: 3  Basic Mobility Total Score: 22       Therapeutic Activities and Exercises:   Pt educated on role of PT/POC. Pt verbalized understanding.   Pt encouraged to ambulate 4x/ daily with assistance/supervision from nursing/therapy/family. Pt agreeable.  Pt reminded of 3/3 sternal precautions. Pt compliant throughout session.       Patient left up in chair with all lines intact, call button in reach and RN  notified..    GOALS:   Multidisciplinary Problems     Physical Therapy Goals        Problem: Physical Therapy Goal    Goal Priority Disciplines Outcome Goal Variances Interventions   Physical Therapy Goal     PT, PT/OT Ongoing (interventions implemented as appropriate)     Description:  Goals to be met by: 2019    Patient will increase functional independence with mobility by performin. Supine <> sit with Contact Guard Assistance.  2. Sit <> stand transfer with Contact Guard Assistance using No Assistive Device.- met 2019  Sit <> stand transfer with supervision using no assistive device- met 2019  3. Bed <> chair transfer via Stand Pivot with Contact Guard Assistance using No Assistive Device.- met 2019  4. Gait  x 150 feet  with Contact Guard Assistance using No Assistive Device to prepare for community ambulation and endurance activities.  5. Dynamic standing for 8 minutes with Contact Guard Assistance using Rolling Walker to prepare for functional tasks in standing.  6. Able to tolerate exercise for 15-20 reps with independence.  7. Patient is able to recall 3/3 sternal precautions without assistance.                           Time Tracking:     PT Received On: 08/20/19  PT Start Time: 1019     PT Stop Time: 1036  PT Total Time (min): 17 min     Billable Minutes: Gait Training 17 mins     Treatment Type: Treatment  PT/PTA: PT           Rachel Cantu, PT  08/20/2019

## 2019-08-20 NOTE — PLAN OF CARE
Problem: Adult Inpatient Plan of Care  Goal: Plan of Care Review  8/12: Admit to SICU s/p CABG x 3. Levo on. 500 albumin. Propofol d/c post-op. 1 amp bicarb.  Insulin and cardene started  8/13: levo off.    Plan of care discussed; verbalized understanding using teach-back method. No acute events overnight. Patient ambulated to bathroom and bed with little assistance. NSR on monitor. Room air. Blood glucose monitoring AC/HS. VSS. Will continue to monitor.

## 2019-08-21 ENCOUNTER — TELEPHONE (OUTPATIENT)
Dept: PHARMACY | Facility: CLINIC | Age: 74
End: 2019-08-21

## 2019-08-21 VITALS
BODY MASS INDEX: 41.46 KG/M2 | HEART RATE: 77 BPM | RESPIRATION RATE: 18 BRPM | DIASTOLIC BLOOD PRESSURE: 58 MMHG | HEIGHT: 62 IN | WEIGHT: 225.31 LBS | TEMPERATURE: 98 F | OXYGEN SATURATION: 98 % | SYSTOLIC BLOOD PRESSURE: 120 MMHG

## 2019-08-21 LAB
ANION GAP SERPL CALC-SCNC: 13 MMOL/L (ref 8–16)
ANISOCYTOSIS BLD QL SMEAR: SLIGHT
BASOPHILS # BLD AUTO: 0.02 K/UL (ref 0–0.2)
BASOPHILS NFR BLD: 0.3 % (ref 0–1.9)
BUN SERPL-MCNC: 31 MG/DL (ref 8–23)
CALCIUM SERPL-MCNC: 10 MG/DL (ref 8.7–10.5)
CHLORIDE SERPL-SCNC: 103 MMOL/L (ref 95–110)
CO2 SERPL-SCNC: 20 MMOL/L (ref 23–29)
CREAT SERPL-MCNC: 1.6 MG/DL (ref 0.5–1.4)
DIFFERENTIAL METHOD: ABNORMAL
EOSINOPHIL # BLD AUTO: 0.2 K/UL (ref 0–0.5)
EOSINOPHIL NFR BLD: 2.5 % (ref 0–8)
ERYTHROCYTE [DISTWIDTH] IN BLOOD BY AUTOMATED COUNT: 15 % (ref 11.5–14.5)
EST. GFR  (AFRICAN AMERICAN): 36.8 ML/MIN/1.73 M^2
EST. GFR  (NON AFRICAN AMERICAN): 32 ML/MIN/1.73 M^2
GLUCOSE SERPL-MCNC: 149 MG/DL (ref 70–110)
HCT VFR BLD AUTO: 26.7 % (ref 37–48.5)
HGB BLD-MCNC: 8.4 G/DL (ref 12–16)
HYPOCHROMIA BLD QL SMEAR: ABNORMAL
IMM GRANULOCYTES # BLD AUTO: 0.17 K/UL (ref 0–0.04)
IMM GRANULOCYTES NFR BLD AUTO: 2.2 % (ref 0–0.5)
LYMPHOCYTES # BLD AUTO: 1.8 K/UL (ref 1–4.8)
LYMPHOCYTES NFR BLD: 22.3 % (ref 18–48)
MAGNESIUM SERPL-MCNC: 1.8 MG/DL (ref 1.6–2.6)
MCH RBC QN AUTO: 28.2 PG (ref 27–31)
MCHC RBC AUTO-ENTMCNC: 31.5 G/DL (ref 32–36)
MCV RBC AUTO: 90 FL (ref 82–98)
MONOCYTES # BLD AUTO: 0.6 K/UL (ref 0.3–1)
MONOCYTES NFR BLD: 7.2 % (ref 4–15)
NEUTROPHILS # BLD AUTO: 5.2 K/UL (ref 1.8–7.7)
NEUTROPHILS NFR BLD: 65.5 % (ref 38–73)
NRBC BLD-RTO: 0 /100 WBC
OVALOCYTES BLD QL SMEAR: ABNORMAL
PHOSPHATE SERPL-MCNC: 4.2 MG/DL (ref 2.7–4.5)
PLATELET # BLD AUTO: 263 K/UL (ref 150–350)
PLATELET BLD QL SMEAR: ABNORMAL
PMV BLD AUTO: 9.9 FL (ref 9.2–12.9)
POCT GLUCOSE: 161 MG/DL (ref 70–110)
POIKILOCYTOSIS BLD QL SMEAR: SLIGHT
POTASSIUM SERPL-SCNC: 5.1 MMOL/L (ref 3.5–5.1)
RBC # BLD AUTO: 2.98 M/UL (ref 4–5.4)
SODIUM SERPL-SCNC: 136 MMOL/L (ref 136–145)
WBC # BLD AUTO: 7.88 K/UL (ref 3.9–12.7)

## 2019-08-21 PROCEDURE — 84100 ASSAY OF PHOSPHORUS: CPT

## 2019-08-21 PROCEDURE — 63600175 PHARM REV CODE 636 W HCPCS: Performed by: STUDENT IN AN ORGANIZED HEALTH CARE EDUCATION/TRAINING PROGRAM

## 2019-08-21 PROCEDURE — 80048 BASIC METABOLIC PNL TOTAL CA: CPT

## 2019-08-21 PROCEDURE — 99900035 HC TECH TIME PER 15 MIN (STAT)

## 2019-08-21 PROCEDURE — 36415 COLL VENOUS BLD VENIPUNCTURE: CPT

## 2019-08-21 PROCEDURE — 85025 COMPLETE CBC W/AUTO DIFF WBC: CPT

## 2019-08-21 PROCEDURE — 94761 N-INVAS EAR/PLS OXIMETRY MLT: CPT

## 2019-08-21 PROCEDURE — 25000003 PHARM REV CODE 250: Performed by: PHYSICIAN ASSISTANT

## 2019-08-21 PROCEDURE — 83735 ASSAY OF MAGNESIUM: CPT

## 2019-08-21 PROCEDURE — 25000003 PHARM REV CODE 250: Performed by: THORACIC SURGERY (CARDIOTHORACIC VASCULAR SURGERY)

## 2019-08-21 PROCEDURE — 25000003 PHARM REV CODE 250: Performed by: NURSE PRACTITIONER

## 2019-08-21 RX ORDER — OXYCODONE HYDROCHLORIDE 5 MG/1
5 TABLET ORAL EVERY 4 HOURS PRN
Qty: 42 TABLET | Refills: 0 | Status: SHIPPED | OUTPATIENT
Start: 2019-08-21 | End: 2019-08-28

## 2019-08-21 RX ORDER — AMIODARONE HYDROCHLORIDE 400 MG/1
400 TABLET ORAL 2 TIMES DAILY
Qty: 60 TABLET | Refills: 11 | Status: ON HOLD | OUTPATIENT
Start: 2019-08-21 | End: 2019-09-16 | Stop reason: HOSPADM

## 2019-08-21 RX ORDER — METOPROLOL TARTRATE 25 MG/1
75 TABLET, FILM COATED ORAL 2 TIMES DAILY
Qty: 180 TABLET | Refills: 11 | Status: ON HOLD | OUTPATIENT
Start: 2019-08-21 | End: 2019-09-16 | Stop reason: HOSPADM

## 2019-08-21 RX ORDER — FUROSEMIDE 20 MG/1
20 TABLET ORAL DAILY
Status: DISCONTINUED | OUTPATIENT
Start: 2019-08-21 | End: 2019-08-21 | Stop reason: HOSPADM

## 2019-08-21 RX ORDER — INSULIN ASPART 100 [IU]/ML
10 INJECTION, SOLUTION INTRAVENOUS; SUBCUTANEOUS
Qty: 27 ML | Refills: 3 | Status: ON HOLD | OUTPATIENT
Start: 2019-08-21 | End: 2019-09-16 | Stop reason: HOSPADM

## 2019-08-21 RX ORDER — POTASSIUM CHLORIDE 20 MEQ/1
20 TABLET, EXTENDED RELEASE ORAL DAILY
Qty: 60 TABLET | Refills: 11 | Status: ON HOLD | OUTPATIENT
Start: 2019-08-21 | End: 2019-09-16 | Stop reason: HOSPADM

## 2019-08-21 RX ORDER — LOSARTAN POTASSIUM 50 MG/1
50 TABLET ORAL DAILY
Qty: 90 TABLET | Refills: 3 | Status: ON HOLD | OUTPATIENT
Start: 2019-08-21 | End: 2019-09-16 | Stop reason: HOSPADM

## 2019-08-21 RX ORDER — ASPIRIN 325 MG
325 TABLET, DELAYED RELEASE (ENTERIC COATED) ORAL DAILY
Refills: 0 | Status: ON HOLD | COMMUNITY
Start: 2019-08-21 | End: 2019-10-29 | Stop reason: HOSPADM

## 2019-08-21 RX ORDER — FUROSEMIDE 20 MG/1
20 TABLET ORAL 2 TIMES DAILY
Qty: 60 TABLET | Refills: 11 | Status: ON HOLD | OUTPATIENT
Start: 2019-08-21 | End: 2019-09-16 | Stop reason: HOSPADM

## 2019-08-21 RX ORDER — DOCUSATE SODIUM 100 MG/1
100 CAPSULE, LIQUID FILLED ORAL 2 TIMES DAILY
Refills: 0 | COMMUNITY
Start: 2019-08-21 | End: 2019-09-17

## 2019-08-21 RX ADMIN — INSULIN ASPART 10 UNITS: 100 INJECTION, SOLUTION INTRAVENOUS; SUBCUTANEOUS at 07:08

## 2019-08-21 RX ADMIN — FUROSEMIDE 20 MG: 20 TABLET ORAL at 10:08

## 2019-08-21 RX ADMIN — ASPIRIN 325 MG: 325 TABLET, DELAYED RELEASE ORAL at 08:08

## 2019-08-21 RX ADMIN — HEPARIN SODIUM 5000 UNITS: 5000 INJECTION, SOLUTION INTRAVENOUS; SUBCUTANEOUS at 07:08

## 2019-08-21 RX ADMIN — PANTOPRAZOLE SODIUM 40 MG: 40 TABLET, DELAYED RELEASE ORAL at 06:08

## 2019-08-21 RX ADMIN — METOPROLOL TARTRATE 75 MG: 50 TABLET ORAL at 08:08

## 2019-08-21 RX ADMIN — LOSARTAN POTASSIUM 50 MG: 50 TABLET, FILM COATED ORAL at 08:08

## 2019-08-21 RX ADMIN — AMIODARONE HYDROCHLORIDE 400 MG: 200 TABLET ORAL at 08:08

## 2019-08-21 NOTE — PLAN OF CARE
08/21/19 1006   Post-Acute Status   Post-Acute Authorization Home Health/Hospice   Home Health/Hospice Status Set-up Complete     [facility] arranged [facility] pt.  SW notified them of the dc today.  They will see her tomorrow.  [facility] notified N that the pt will dc today and asked them to start the post hospitalization meal plan for the patient.    Mar [place], [facility] x [phone]

## 2019-08-21 NOTE — PLAN OF CARE
08/21/19 1514   Final Note   Assessment Type Final Discharge Note   Anticipated Discharge Disposition Home-Health   What phone number can be called within the next 1-3 days to see how you are doing after discharge? [phone]   Hospital Follow Up  Appt(s) scheduled? Yes   Discharge plans and expectations educations in teach back method with documentation complete? Yes   Right Care Referral Info   Post Acute Recommendation Home-care   Referral Type Home Health referral sent to [facility]    Facility Name [facility]     Pt was set up by [facility] for [facility]  services. She will be enrolled in the after inpatient hospitalization meal plan program. Discharge to home in care of her spouse. Follow up appt scheduled by clinic staff.    Future Appointments   Date Time Provider Department Center   9/11/2019 11:00 AM EKG, APPT [facility] EKG [facility]   9/11/2019 11:30 AM [facility] [id number] LB LIMIT [facility] XRAY OP [facility]   9/11/2019 12:00 PM Peterson Ventura MD [facility] CARDVAS [facility]   9/17/2019  1:30 PM Pop Henderson MD [facility] CARDIO [facility]   10/4/2019  1:30 PM Liz Roberts MD [facility]

## 2019-08-21 NOTE — HOSPITAL COURSE
On 8/12/19, the patient was taken to the Operating Room for the above stated procedure. Please see the previously dictated operative report for complete details. Postoperatively, the patient was taken from the  Operating Room to the ICU where the vital signs were monitored and pain was kept under control. The patient was weaned from the drips and extubated in the ICU per protocol. Once hemodynamically stable, the patient was transferred to the Cardiac Step-Down floor for continued strengthening and ambulation. On postoperative day 9, the patient was ready for discharge to home. At the time of discharge, the patient was ambulating unassisted. Pain was well controlled with oral analgesics and the patient was tolerating the diet.     MOBILITY AND ACTIVITY: As tolerated. Patient may shower. No heavy lifting of greater than 5 pounds and no driving.     DIET: Cardiac Diet     WOUND CARE INSTRUCTIONS: Check for redness, swelling and drainage around the  incision or wound. Patient is to call for any obvious bleeding, drainage, pus from the wound, unusual problems or difficulties or temperature of greater than 101   degrees.     FOLLOWUP: Follow up with Dr. Ventura in approximately 3 weeks. Prior to this  appointment, the patient will have a chest x-ray and EKG.    DISCHARGE CONDITION: At the time of discharge, the patient was in sinus rhythm and afebrile with stable vital signs.

## 2019-08-21 NOTE — DISCHARGE SUMMARY
[facility]  Cardiothoracic Surgery  Discharge Summary      Patient Name: Kaylee Romero  MRN: [id number]  Admission Date: 8/12/2019  Hospital Length of Stay: 9 days  Discharge Date and Time:  08/21/2019 11:00 AM  Attending Physician: Peterson Ventura MD   Discharging Provider: Keila Warner NP  Primary Care Provider: Liz Roberts MD    HPI:   This is a 74-year-old woman who was transferred   after a non-STEMI.  I saw her in the hospital.  She followed up in the clinic.    She was having unstable angina, so we moved her surgery up.  She had anemia of   unknown origin.  No GI bleeding per history.  She also had insulin-dependent   diabetes.  I discussed the risks and benefits of the procedure.  She was able to   give informed consent.       Procedure(s) (LRB):  CORONARY ARTERY BYPASS GRAFT (CABG)X3 (N/A)  SURGICAL PROCUREMENT, VEIN, ENDOSCOPIC (Left)      Indwelling Lines/Drains at time of discharge:   Lines/Drains/Airways          None        Hospital Course: On 8/12/19, the patient was taken to the Operating Room for the above stated procedure. Please see the previously dictated operative report for complete details. Postoperatively, the patient was taken from the  Operating Room to the ICU where the vital signs were monitored and pain was kept under control. The patient was weaned from the drips and extubated in the ICU per protocol. On POD #4 patient went into A.Fib and was converted with amiodarone and transitioned to PO. Once hemodynamically stable, the patient was transferred to the Cardiac Step-Down floor for continued strengthening and ambulation. On postoperative day 9, the patient was ready for discharge to home. At the time of discharge, the patient was ambulating unassisted. Pain was well controlled with oral analgesics and the patient was tolerating the diet.     MOBILITY AND ACTIVITY: As tolerated. Patient may shower. No heavy lifting of greater than 5 pounds and no  driving.     DIET: Cardiac Diet     WOUND CARE INSTRUCTIONS: Check for redness, swelling and drainage around the  incision or wound. Patient is to call for any obvious bleeding, drainage, pus from the wound, unusual problems or difficulties or temperature of greater than 101   degrees.     FOLLOWUP: Follow up with Dr. Viera in approximately 3 weeks. Prior to this  appointment, the patient will have a chest x-ray and EKG.    DISCHARGE CONDITION: At the time of discharge, the patient was in sinus rhythm and afebrile with stable vital signs.      Consults (From admission, onward)        Status Ordering Provider     Consult Case Management/Social Work  Once     Provider:  (Not yet assigned)    Acknowledged SHAILA VIERA     Consult to Endocrinology  Once     Provider:  (Not yet assigned)    Completed SHAILA VIERA     Inpatient consult to Cardiac Rehab  Once     Provider:  (Not yet assigned)    SHAILA He     Inpatient consult to PICC team ([facility])  Once     Provider:  (Not yet assigned)    Completed SHAILA VIERA     Inpatient consult to Registered Dietitian/Nutritionist  Once     Provider:  (Not yet assigned)    Completed SHAILA VIERA     Inpatient consult to Social Work  Once     Provider:  (Not yet assigned)    Acknowledged SHAILA VIERA          Pending Diagnostic Studies:     None          No new Assessment & Plan notes have been filed under this hospital service since the last note was generated.  Service: Cardiothoracic Surgery    Final Active Diagnoses:    Diagnosis Date Noted POA    PRINCIPAL PROBLEM:  S/P CABG (coronary artery bypass graft) [Z95.1] 08/12/2019 Not Applicable    Acute blood loss anemia [D62] 08/14/2019 No    Hypophosphatemia [E83.39] 08/14/2019 No    CAD (coronary artery disease) [I25.10] 08/12/2019 Yes    Type 2 diabetes mellitus with stage 3 chronic kidney disease, without long-term current use of insulin [E11.22, N18.3] 05/16/2019 Yes     CKD (chronic kidney disease) stage 3, GFR 30-59 ml/min [N18.3] 04/20/2017 Yes    KAMRAN on CPAP [G47.33, Z99.89] 06/28/2016 Not Applicable    Morbid obesity with body mass index (BMI) of 40.0 or higher [E66.01] 05/09/2016 Yes      Problems Resolved During this Admission:      Discharged Condition: stable    Disposition: Home or Self Care    Follow Up:  Follow-up Information     [facility].    Specialties:  Home Health Services, Physical Therapy, Occupational Therapy  Why:  Home Health: The nurse will see the patient the day after discharge.  Contact information:  [address] [place] [address]  [phone]             Peterson Ventura MD In 3 weeks.    Specialty:  Cardiothoracic Surgery  Contact information:  [address]  [facility] [address]  [phone]                 Patient Instructions:      Ambulatory referral to Home Health   Referral Priority: Routine Referral Type: Home Health   Referral Reason: Specialty Services Required   Requested Specialty: Home Health Services   Number of Visits Requested: 1     Medications:  Reconciled Home Medications:      Medication List      START taking these medications    amiodarone 400 MG tablet  Commonly known as:  PACERONE  Take 1 tablet (400 mg total) by mouth 2 (two) times daily.     docusate sodium 100 MG capsule  Commonly known as:  COLACE  Take 1 capsule (100 mg total) by mouth 2 (two) times daily.     furosemide 20 MG tablet  Commonly known as:  LASIX  Take 1 tablet (20 mg total) by mouth 2 (two) times daily.     insulin aspart U-100 100 unit/mL (3 mL) Inpn pen  Commonly known as:  NovoLOG Flexpen U-100 Insulin  Inject 10 Units into the skin 3 (three) times daily with meals.     losartan 50 MG tablet  Commonly known as:  COZAAR  Take 1 tablet (50 mg total) by mouth once daily.     metoprolol tartrate 25 MG tablet  Commonly known as:  LOPRESSOR  Take 3 tablets (75 mg total) by mouth 2 (two) times daily.     oxyCODONE  "5 MG immediate release tablet  Commonly known as:  ROXICODONE  Take 1 tablet (5 mg total) by mouth every 4 (four) hours as needed.     potassium chloride SA 20 MEQ tablet  Commonly known as:  K-DUR,KLOR-CON  Take 1 tablet (20 mEq total) by mouth once daily.        CHANGE how you take these medications    aspirin 325 MG EC tablet  Commonly known as:  ECOTRIN  Take 1 tablet (325 mg total) by mouth once daily.  What changed:    · medication strength  · how much to take     insulin detemir U-100 100 unit/mL (3 mL) Inpn pen  Commonly known as:  LEVEMIR FLEXTOUCH  Inject 12 Units into the skin every evening.  What changed:  how much to take        CONTINUE taking these medications    alcohol swabs Padm  Commonly known as:  ALCOHOL PADS  Apply 1 each topically as needed.     alendronate 70 MG tablet  Commonly known as:  FOSAMAX  Take 1 tablet (70 mg total) by mouth every 7 days. Take with a full glass of water & remain upright for at least 30 minutes     atorvastatin 80 MG tablet  Commonly known as:  LIPITOR  Take 1 tablet (80 mg total) by mouth once daily.     BD ULTRA-FINE SHORT PEN NEEDLE 31 gauge x 5/16" Ndle  Generic drug:  pen needle, diabetic  Use every evening.     blood sugar diagnostic Strp  1 strip by Misc.(Non-Drug; Combo Route) route once daily.     blood-glucose meter kit  Use as instructed     lancets Misc  1 lancet by Misc.(Non-Drug; Combo Route) route once daily.     ONE DAILY MULTIVITAMIN per tablet  Generic drug:  multivitamin  Take 1 tablet by mouth once daily.     TRULICITY 1.5 mg/0.5 mL Pnij  Generic drug:  dulaglutide  INJECT 1.5 MG UNDER THE SKIN ONCE A WEEK        STOP taking these medications    carvedilol 6.25 MG tablet  Commonly known as:  COREG     chlorthalidone 25 MG Tab  Commonly known as:  HYGROTEN     glipiZIDE 10 MG Tr24  Commonly known as:  GLUCOTROL     NIFEdipine 30 MG (OSM) 24 hr tablet  Commonly known as:  PROCARDIA-XL     nitroGLYCERIN 0.4 MG SL tablet  Commonly known as:  " NITROSTAT     valsartan 320 MG tablet  Commonly known as:  DIOVAN          Time spent on the discharge of patient: 35 minutes    Keila Warner NP  Cardiothoracic Surgery  [facility]

## 2019-08-21 NOTE — PLAN OF CARE
Problem: Adult Inpatient Plan of Care  Goal: Plan of Care Review  8/12: Admit to SICU s/p CABG x 3. Levo on. 500 albumin. Propofol d/c post-op. 1 amp bicarb.  Insulin and cardene started  8/13: levo off.    Outcome: Ongoing (interventions implemented as appropriate)  Pt AAO and VSS. Pt with IVP lasix. Pt to be potentially DC/d 8/21. Pt educated on fall risk overnight,pt remained free from falls/trauma/injury. Denies chest pain, SOB, palpitations, dizziness, pain, or discomfort. Plan of care reviewed with pt, all questions answered. Bed locked in lowest position, call bell within reach, no acute distress noted, will continue to monitor.

## 2019-08-22 ENCOUNTER — PATIENT OUTREACH (OUTPATIENT)
Dept: ADMINISTRATIVE | Facility: CLINIC | Age: 74
End: 2019-08-22

## 2019-08-22 NOTE — PATIENT INSTRUCTIONS
After Coronary Artery Bypass Surgery  Your healthcare provider performed coronary artery bypass graft surgery (also called CABG, pronounced cabbage). This surgery created new pathways around blocked parts of your hearts blood vessels, allowing blood to reach your heart muscle. Your healthcare provider used a healthy blood vessel from another part of your body (a graft) to restore blood flow.  Activity  · Discuss with your healthcare doctor what you can and cant do as you recover. You will have good and bad days. This is normal. But tell your healthcare provider if you feel depressed, have trouble sleeping, or have a persistent decrease in appetite. Although these problems are common after surgery, they can slow your recovery. Its important to seek help.  · Let others drive you wherever you need to go for the first 3 to 6 weeks after your surgery.  · Ask someone to stand nearby while you shower or do other activities, just in case you need help.  · Avoid using very hot water while showering. It can affect your circulation and make you dizzy.  · Weigh yourself every day, at the same time of day, and in the same kind of clothes. Quick weight gain can be a sign of a problem that needs your healthcare providers attention.  · You may start doing light work around the house and yard after 2 to 3 weeks at home. Dont lift anything heavier than 5 pounds. Your healthcare provider may give you a more specific weight restriction. Until approved by your healthcare provider, avoid mowing the lawn, vacuuming, driving, and doing other activities that could strain your breastbone.  · Ask your healthcare provider when you can expect to return to work.  Pain relief  You will recover faster after surgery if your pain is kept under control:  · Dont be surprised if you feel sharp pains as your breastbone heals or if you have soreness in your incision during changes in weather.  · Tell your healthcare provider if you have  questions about what youre feeling, if your medicines dont reduce your pain, or if you suddenly feel worse.  Incision care  Healing takes several weeks. The bandage or dressing on your chest will likely be removed before you go home. If it is still in place, ask your healthcare provider how you should care for it after you return home. Do the following to care for your incision:  · If there are any steri-strips still on your incision, you can remove them after a week if they haven't already fallen off.  · Clean your incision every day with soap and water.  · Gently pat the area of the incision to dry it.  · Dont use any powders, lotions, or oils on your incision until it is well healed.  Lifestyle changes  · Ask your healthcare provider when you can start a walking program:  ¨ If you havent already started a walking program in the hospital, begin with short walks (about 5 minutes) at home. Go a little longer each day.  ¨ Choose a safe place with a level surface, such as a local park or mall.  ¨ Wear supportive shoes to prevent injury to your knees and ankles.  ¨ Walk with someone. Its more fun and helps you stay with it.  · Take your medicines exactly as directed. Dont skip doses.  · Maintain a healthy weight. Get help to lose any extra pounds.  · Avoid fatty and fried foods. Stick to lean meats, such as chicken or fish.   · Cut back on salt:  ¨ Limit canned, dried, packaged, and fast foods.  ¨ Dont add salt to your food at the table.  ¨ Season foods with herbs instead of salt when you cook.  · Break the smoking habit. Enroll in a stop-smoking program to improve your chances of success.  When to call your healthcare provider  Call your healthcare provider immediately if you have any of the following:  · Chest pain or a return of the heart symptoms you had before your surgery  · Fever of 100.4°F (38°C) or higher, or as directed by your healthcare provider  · Signs of infection (redness, swelling, drainage, or  warmth) at the incision site  · Shortness of breath  · Fainting  · Weight gain of more than 3 pounds in 1 day, more than 5 pounds in 1 week, or whatever weight gain you were told to report by your healthcare provider  · New or increased swelling in your hands, feet, or ankles  · Unrelieved pain at the incision site(s)  · Changes in the location, type, or severity of pain  · Fast or irregular pulse  · Persistent abdominal pain  · Nausea  · Trouble urinating  · Any unusual bleeding   Date Last Reviewed: 10/1/2016  © 4661-9333 [employer]. [address] [place] [address]. All rights reserved. This information is not intended as a substitute for professional medical care. Always follow your healthcare professional's instructions.

## 2019-08-23 PROCEDURE — G0180 MD CERTIFICATION HHA PATIENT: HCPCS | Mod: ,,, | Performed by: THORACIC SURGERY (CARDIOTHORACIC VASCULAR SURGERY)

## 2019-08-23 PROCEDURE — G0180 PR HOME HEALTH MD CERTIFICATION: ICD-10-PCS | Mod: ,,, | Performed by: THORACIC SURGERY (CARDIOTHORACIC VASCULAR SURGERY)

## 2019-08-23 NOTE — PROGRESS NOTES
"Last 5 Patient Entered Readings                                      Current 30 Day Average: 128/76     Recent Readings 8/22/2019 8/11/2019 8/10/2019 8/6/2019 7/29/2019    SBP (mmHg) 109 129 136 135 138    DBP (mmHg) 59 84 98 55 94    Pulse 84 93 93 90 84        Last 6 Patient Entered Readings                                          Most Recent A1c: 7.5% on 8/7/2019  (Goal: 8%)     Recent Readings 8/22/2019 8/11/2019 8/10/2019 8/6/2019 8/5/2019    Blood Glucose (mg/dL) 265 194 104 148 164        Called patient for follow-up on hypertension and diabetes. She reports "not feeling too well." She was discharged from hospital on 8/21/19; went out to dinner on last night to celebrate her 's birthday. Today, she feels "wiped out." Encouraged her to get some rest and fluids. 30-day blood pressure average is at goal of <130/80. Will continue to monitor readings and f/u in a few weeks.       "

## 2019-08-27 ENCOUNTER — NURSE TRIAGE (OUTPATIENT)
Dept: ADMINISTRATIVE | Facility: CLINIC | Age: 74
End: 2019-08-27

## 2019-08-27 ENCOUNTER — HOSPITAL ENCOUNTER (INPATIENT)
Facility: HOSPITAL | Age: 74
LOS: 13 days | Discharge: NURSING, REHAB OR LONG-TERM CARE | DRG: 872 | End: 2019-09-09
Attending: EMERGENCY MEDICINE | Admitting: THORACIC SURGERY (CARDIOTHORACIC VASCULAR SURGERY)
Payer: MEDICARE

## 2019-08-27 DIAGNOSIS — E11.22 TYPE 2 DIABETES MELLITUS WITH STAGE 3 CHRONIC KIDNEY DISEASE, WITHOUT LONG-TERM CURRENT USE OF INSULIN: ICD-10-CM

## 2019-08-27 DIAGNOSIS — E66.01 MORBID OBESITY WITH BODY MASS INDEX (BMI) OF 40.0 OR HIGHER: ICD-10-CM

## 2019-08-27 DIAGNOSIS — Z95.1 S/P CABG X 2: ICD-10-CM

## 2019-08-27 DIAGNOSIS — N18.30 CKD (CHRONIC KIDNEY DISEASE) STAGE 3, GFR 30-59 ML/MIN: ICD-10-CM

## 2019-08-27 DIAGNOSIS — N18.30 TYPE 2 DIABETES MELLITUS WITH STAGE 3 CHRONIC KIDNEY DISEASE, WITHOUT LONG-TERM CURRENT USE OF INSULIN: ICD-10-CM

## 2019-08-27 DIAGNOSIS — M25.512 CHRONIC LEFT SHOULDER PAIN: ICD-10-CM

## 2019-08-27 DIAGNOSIS — A41.9 SEPSIS, DUE TO UNSPECIFIED ORGANISM: ICD-10-CM

## 2019-08-27 DIAGNOSIS — Z95.1 S/P CABG (CORONARY ARTERY BYPASS GRAFT): Primary | ICD-10-CM

## 2019-08-27 DIAGNOSIS — D62 ACUTE BLOOD LOSS ANEMIA: ICD-10-CM

## 2019-08-27 DIAGNOSIS — G89.29 CHRONIC LEFT SHOULDER PAIN: ICD-10-CM
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
## 2019-08-27 DIAGNOSIS — R07.9 CHEST PAIN: ICD-10-CM

## 2019-08-27 DIAGNOSIS — R53.81 PHYSICAL DECONDITIONING: ICD-10-CM

## 2019-08-27 DIAGNOSIS — R65.10 SIRS (SYSTEMIC INFLAMMATORY RESPONSE SYNDROME): ICD-10-CM

## 2019-08-27 DIAGNOSIS — I10 HYPERTENSION, UNSPECIFIED TYPE: ICD-10-CM

## 2019-08-27 PROCEDURE — 99291 CRITICAL CARE FIRST HOUR: CPT | Mod: 25

## 2019-08-27 PROCEDURE — 99291 PR CRITICAL CARE, E/M 30-74 MINUTES: ICD-10-PCS | Mod: ,,, | Performed by: PHYSICIAN ASSISTANT

## 2019-08-27 PROCEDURE — 99291 CRITICAL CARE FIRST HOUR: CPT | Mod: ,,, | Performed by: PHYSICIAN ASSISTANT

## 2019-08-27 PROCEDURE — 12000002 HC ACUTE/MED SURGE SEMI-PRIVATE ROOM

## 2019-08-27 PROCEDURE — 63600175 PHARM REV CODE 636 W HCPCS: Performed by: PHYSICIAN ASSISTANT

## 2019-08-27 PROCEDURE — 82962 GLUCOSE BLOOD TEST: CPT

## 2019-08-27 RX ORDER — VANCOMYCIN 2 GRAM/500 ML IN 0.9 % SODIUM CHLORIDE INTRAVENOUS
2000
Status: COMPLETED | OUTPATIENT
Start: 2019-08-28 | End: 2019-08-28

## 2019-08-27 RX ADMIN — SODIUM CHLORIDE 1000 ML: 0.9 INJECTION, SOLUTION INTRAVENOUS at 11:08

## 2019-08-27 NOTE — TELEPHONE ENCOUNTER
"    Reason for Disposition   Unable to walk, or can only walk with assistance (e.g., requires support)    Protocols used: NAUSEA-A-OH  cg called stated pt had a fall last night. Pt has a "serious hickey" on her head with some bruising to face and nose. Pt c/o nausea and she vomited once last night. Pt does c/o of dizziness and unsteadiness when getting out of the bed.  stated he doesn't want her walking alone. Pt educated per care advice and will be going to [facility].   "

## 2019-08-28 PROBLEM — A41.9 SEPSIS: Status: ACTIVE | Noted: 2019-08-28

## 2019-08-28 LAB
ALBUMIN SERPL BCP-MCNC: 2.5 G/DL (ref 3.5–5.2)
ALBUMIN SERPL BCP-MCNC: 2.8 G/DL (ref 3.5–5.2)
ALP SERPL-CCNC: 92 U/L (ref 55–135)
ALP SERPL-CCNC: 97 U/L (ref 55–135)
ALT SERPL W/O P-5'-P-CCNC: 21 U/L (ref 10–44)
ALT SERPL W/O P-5'-P-CCNC: 27 U/L (ref 10–44)
ANION GAP SERPL CALC-SCNC: 12 MMOL/L (ref 8–16)
ANION GAP SERPL CALC-SCNC: 12 MMOL/L (ref 8–16)
ANION GAP SERPL CALC-SCNC: 9 MMOL/L (ref 8–16)
APTT BLDCRRT: 26.1 SEC (ref 21–32)
AST SERPL-CCNC: 22 U/L (ref 10–40)
AST SERPL-CCNC: 34 U/L (ref 10–40)
AV INDEX (PROSTH): 0.83
AV MEAN GRADIENT: 8 MMHG
AV PEAK GRADIENT: 12 MMHG
AV VALVE AREA: 2.58 CM2
AV VELOCITY RATIO: 0.79
BACTERIA #/AREA URNS AUTO: ABNORMAL /HPF
BASOPHILS # BLD AUTO: 0.01 K/UL (ref 0–0.2)
BASOPHILS # BLD AUTO: 0.02 K/UL (ref 0–0.2)
BASOPHILS NFR BLD: 0.1 % (ref 0–1.9)
BASOPHILS NFR BLD: 0.2 % (ref 0–1.9)
BILIRUB SERPL-MCNC: 1 MG/DL (ref 0.1–1)
BILIRUB SERPL-MCNC: 1.1 MG/DL (ref 0.1–1)
BILIRUB UR QL STRIP: NEGATIVE
BSA FOR ECHO PROCEDURE: 2.14 M2
BUN SERPL-MCNC: 27 MG/DL (ref 8–23)
BUN SERPL-MCNC: 28 MG/DL (ref 8–23)
BUN SERPL-MCNC: 28 MG/DL (ref 8–23)
CALCIUM SERPL-MCNC: 8 MG/DL (ref 8.7–10.5)
CALCIUM SERPL-MCNC: 8.6 MG/DL (ref 8.7–10.5)
CALCIUM SERPL-MCNC: 9.8 MG/DL (ref 8.7–10.5)
CHLORIDE SERPL-SCNC: 102 MMOL/L (ref 95–110)
CHLORIDE SERPL-SCNC: 104 MMOL/L (ref 95–110)
CHLORIDE SERPL-SCNC: 95 MMOL/L (ref 95–110)
CLARITY UR REFRACT.AUTO: ABNORMAL
CO2 SERPL-SCNC: 19 MMOL/L (ref 23–29)
CO2 SERPL-SCNC: 21 MMOL/L (ref 23–29)
CO2 SERPL-SCNC: 23 MMOL/L (ref 23–29)
COLOR UR AUTO: YELLOW
CREAT SERPL-MCNC: 1.9 MG/DL (ref 0.5–1.4)
CREAT SERPL-MCNC: 1.9 MG/DL (ref 0.5–1.4)
CREAT SERPL-MCNC: 2 MG/DL (ref 0.5–1.4)
CV ECHO LV RWT: 0.46 CM
DIFFERENTIAL METHOD: ABNORMAL
DIFFERENTIAL METHOD: ABNORMAL
DOP CALC AO PEAK VEL: 1.72 M/S
DOP CALC AO VTI: 29.6 CM
DOP CALC LVOT AREA: 3.1 CM2
DOP CALC LVOT DIAMETER: 1.99 CM
DOP CALC LVOT PEAK VEL: 1.36 M/S
DOP CALC LVOT STROKE VOLUME: 76.29 CM3
DOP CALCLVOT PEAK VEL VTI: 24.54 CM
E WAVE DECELERATION TIME: 164.01 MSEC
E/A RATIO: 0.87
E/E' RATIO: 12.57 M/S
ECHO LV POSTERIOR WALL: 0.82 CM (ref 0.6–1.1)
EOSINOPHIL # BLD AUTO: 0 K/UL (ref 0–0.5)
EOSINOPHIL # BLD AUTO: 0 K/UL (ref 0–0.5)
EOSINOPHIL NFR BLD: 0 % (ref 0–8)
EOSINOPHIL NFR BLD: 0 % (ref 0–8)
ERYTHROCYTE [DISTWIDTH] IN BLOOD BY AUTOMATED COUNT: 14.5 % (ref 11.5–14.5)
ERYTHROCYTE [DISTWIDTH] IN BLOOD BY AUTOMATED COUNT: 14.5 % (ref 11.5–14.5)
EST. GFR  (AFRICAN AMERICAN): 28.1 ML/MIN/1.73 M^2
EST. GFR  (AFRICAN AMERICAN): 29.9 ML/MIN/1.73 M^2
EST. GFR  (AFRICAN AMERICAN): 29.9 ML/MIN/1.73 M^2
EST. GFR  (NON AFRICAN AMERICAN): 24.4 ML/MIN/1.73 M^2
EST. GFR  (NON AFRICAN AMERICAN): 26 ML/MIN/1.73 M^2
EST. GFR  (NON AFRICAN AMERICAN): 26 ML/MIN/1.73 M^2
FRACTIONAL SHORTENING: 36 % (ref 28–44)
GLUCOSE SERPL-MCNC: 225 MG/DL (ref 70–110)
GLUCOSE SERPL-MCNC: 291 MG/DL (ref 70–110)
GLUCOSE SERPL-MCNC: 297 MG/DL (ref 70–110)
GLUCOSE UR QL STRIP: ABNORMAL
HCT VFR BLD AUTO: 25.6 % (ref 37–48.5)
HCT VFR BLD AUTO: 26.9 % (ref 37–48.5)
HGB BLD-MCNC: 8.3 G/DL (ref 12–16)
HGB BLD-MCNC: 8.5 G/DL (ref 12–16)
HGB UR QL STRIP: ABNORMAL
HYALINE CASTS UR QL AUTO: 0 /LPF
IMM GRANULOCYTES # BLD AUTO: 0.05 K/UL (ref 0–0.04)
IMM GRANULOCYTES # BLD AUTO: 0.12 K/UL (ref 0–0.04)
IMM GRANULOCYTES NFR BLD AUTO: 0.5 % (ref 0–0.5)
IMM GRANULOCYTES NFR BLD AUTO: 0.8 % (ref 0–0.5)
INR PPP: 1.1 (ref 0.8–1.2)
INTERVENTRICULAR SEPTUM: 0.83 CM (ref 0.6–1.1)
KETONES UR QL STRIP: NEGATIVE
LA MAJOR: 4.88 CM
LA MINOR: 5.1 CM
LA WIDTH: 3.5 CM
LACTATE SERPL-SCNC: 0.9 MMOL/L (ref 0.5–2.2)
LACTATE SERPL-SCNC: 1.5 MMOL/L (ref 0.5–2.2)
LEFT ATRIUM SIZE: 3.5 CM
LEFT ATRIUM VOLUME INDEX: 25.2 ML/M2
LEFT ATRIUM VOLUME: 51.93 CM3
LEFT INTERNAL DIMENSION IN SYSTOLE: 2.27 CM (ref 2.1–4)
LEFT VENTRICLE DIASTOLIC VOLUME INDEX: 25.3 ML/M2
LEFT VENTRICLE DIASTOLIC VOLUME: 52.12 ML
LEFT VENTRICLE MASS INDEX: 39 G/M2
LEFT VENTRICLE SYSTOLIC VOLUME INDEX: 8.5 ML/M2
LEFT VENTRICLE SYSTOLIC VOLUME: 17.51 ML
LEFT VENTRICULAR INTERNAL DIMENSION IN DIASTOLE: 3.54 CM (ref 3.5–6)
LEFT VENTRICULAR MASS: 80 G
LEUKOCYTE ESTERASE UR QL STRIP: ABNORMAL
LV LATERAL E/E' RATIO: 11 M/S
LV SEPTAL E/E' RATIO: 14.67 M/S
LYMPHOCYTES # BLD AUTO: 0.5 K/UL (ref 1–4.8)
LYMPHOCYTES # BLD AUTO: 0.5 K/UL (ref 1–4.8)
LYMPHOCYTES NFR BLD: 3.3 % (ref 18–48)
LYMPHOCYTES NFR BLD: 4.8 % (ref 18–48)
MAGNESIUM SERPL-MCNC: 1.5 MG/DL (ref 1.6–2.6)
MCH RBC QN AUTO: 27.6 PG (ref 27–31)
MCH RBC QN AUTO: 27.7 PG (ref 27–31)
MCHC RBC AUTO-ENTMCNC: 31.6 G/DL (ref 32–36)
MCHC RBC AUTO-ENTMCNC: 32.4 G/DL (ref 32–36)
MCV RBC AUTO: 85 FL (ref 82–98)
MCV RBC AUTO: 88 FL (ref 82–98)
MICROSCOPIC COMMENT: ABNORMAL
MONOCYTES # BLD AUTO: 0.1 K/UL (ref 0.3–1)
MONOCYTES # BLD AUTO: 1.1 K/UL (ref 0.3–1)
MONOCYTES NFR BLD: 1 % (ref 4–15)
MONOCYTES NFR BLD: 7.1 % (ref 4–15)
MV PEAK A VEL: 1.01 M/S
MV PEAK E VEL: 0.88 M/S
NEUTROPHILS # BLD AUTO: 13.5 K/UL (ref 1.8–7.7)
NEUTROPHILS # BLD AUTO: 9.9 K/UL (ref 1.8–7.7)
NEUTROPHILS NFR BLD: 88.7 % (ref 38–73)
NEUTROPHILS NFR BLD: 93.5 % (ref 38–73)
NITRITE UR QL STRIP: NEGATIVE
NRBC BLD-RTO: 0 /100 WBC
NRBC BLD-RTO: 0 /100 WBC
PH UR STRIP: 6 [PH] (ref 5–8)
PHOSPHATE SERPL-MCNC: 4 MG/DL (ref 2.7–4.5)
PISA TR MAX VEL: 1.89 M/S
PLATELET # BLD AUTO: 328 K/UL (ref 150–350)
PLATELET # BLD AUTO: 329 K/UL (ref 150–350)
PMV BLD AUTO: 9.5 FL (ref 9.2–12.9)
PMV BLD AUTO: 9.7 FL (ref 9.2–12.9)
POCT GLUCOSE: 223 MG/DL (ref 70–110)
POCT GLUCOSE: 229 MG/DL (ref 70–110)
POCT GLUCOSE: 279 MG/DL (ref 70–110)
POCT GLUCOSE: 284 MG/DL (ref 70–110)
POCT GLUCOSE: 307 MG/DL (ref 70–110)
POCT GLUCOSE: 310 MG/DL (ref 70–110)
POCT GLUCOSE: 319 MG/DL (ref 70–110)
POTASSIUM SERPL-SCNC: 4.3 MMOL/L (ref 3.5–5.1)
POTASSIUM SERPL-SCNC: 4.4 MMOL/L (ref 3.5–5.1)
POTASSIUM SERPL-SCNC: 4.5 MMOL/L (ref 3.5–5.1)
PROT SERPL-MCNC: 7.6 G/DL (ref 6–8.4)
PROT SERPL-MCNC: 8.2 G/DL (ref 6–8.4)
PROT UR QL STRIP: ABNORMAL
PROTHROMBIN TIME: 11.1 SEC (ref 9–12.5)
RA PRESSURE: 3 MMHG
RBC # BLD AUTO: 3.01 M/UL (ref 4–5.4)
RBC # BLD AUTO: 3.07 M/UL (ref 4–5.4)
RBC #/AREA URNS AUTO: 5 /HPF (ref 0–4)
SINUS: 2.7 CM
SODIUM SERPL-SCNC: 130 MMOL/L (ref 136–145)
SODIUM SERPL-SCNC: 133 MMOL/L (ref 136–145)
SODIUM SERPL-SCNC: 134 MMOL/L (ref 136–145)
SP GR UR STRIP: 1.01 (ref 1–1.03)
SQUAMOUS #/AREA URNS AUTO: 0 /HPF
TDI LATERAL: 0.08 M/S
TDI SEPTAL: 0.06 M/S
TDI: 0.07 M/S
TR MAX PG: 14 MMHG
TRICUSPID ANNULAR PLANE SYSTOLIC EXCURSION: 0.55 CM
TV REST PULMONARY ARTERY PRESSURE: 17 MMHG
URN SPEC COLLECT METH UR: ABNORMAL
WBC # BLD AUTO: 10.62 K/UL (ref 3.9–12.7)
WBC # BLD AUTO: 15.24 K/UL (ref 3.9–12.7)
WBC #/AREA URNS AUTO: >100 /HPF (ref 0–5)

## 2019-08-28 PROCEDURE — 80053 COMPREHEN METABOLIC PANEL: CPT | Mod: 91

## 2019-08-28 PROCEDURE — 27000221 HC OXYGEN, UP TO 24 HOURS

## 2019-08-28 PROCEDURE — 87186 SC STD MICRODIL/AGAR DIL: CPT

## 2019-08-28 PROCEDURE — 20000000 HC ICU ROOM

## 2019-08-28 PROCEDURE — 85025 COMPLETE CBC W/AUTO DIFF WBC: CPT

## 2019-08-28 PROCEDURE — 63600175 PHARM REV CODE 636 W HCPCS: Performed by: STUDENT IN AN ORGANIZED HEALTH CARE EDUCATION/TRAINING PROGRAM

## 2019-08-28 PROCEDURE — 80053 COMPREHEN METABOLIC PANEL: CPT

## 2019-08-28 PROCEDURE — 85610 PROTHROMBIN TIME: CPT

## 2019-08-28 PROCEDURE — 85730 THROMBOPLASTIN TIME PARTIAL: CPT

## 2019-08-28 PROCEDURE — 25000003 PHARM REV CODE 250: Performed by: STUDENT IN AN ORGANIZED HEALTH CARE EDUCATION/TRAINING PROGRAM

## 2019-08-28 PROCEDURE — 99231 PR SUBSEQUENT HOSPITAL CARE,LEVL I: ICD-10-PCS | Mod: ,,, | Performed by: SURGERY

## 2019-08-28 PROCEDURE — 99231 SBSQ HOSP IP/OBS SF/LOW 25: CPT | Mod: ,,, | Performed by: SURGERY

## 2019-08-28 PROCEDURE — 25000003 PHARM REV CODE 250: Performed by: PHYSICIAN ASSISTANT

## 2019-08-28 PROCEDURE — 93010 ELECTROCARDIOGRAM REPORT: CPT | Mod: ,,, | Performed by: INTERNAL MEDICINE

## 2019-08-28 PROCEDURE — 83605 ASSAY OF LACTIC ACID: CPT | Mod: 91

## 2019-08-28 PROCEDURE — 84100 ASSAY OF PHOSPHORUS: CPT

## 2019-08-28 PROCEDURE — 87077 CULTURE AEROBIC IDENTIFY: CPT

## 2019-08-28 PROCEDURE — 80048 BASIC METABOLIC PNL TOTAL CA: CPT

## 2019-08-28 PROCEDURE — 63600175 PHARM REV CODE 636 W HCPCS: Performed by: SURGERY

## 2019-08-28 PROCEDURE — 81001 URINALYSIS AUTO W/SCOPE: CPT

## 2019-08-28 PROCEDURE — 83735 ASSAY OF MAGNESIUM: CPT

## 2019-08-28 PROCEDURE — 93010 EKG 12-LEAD: ICD-10-PCS | Mod: ,,, | Performed by: INTERNAL MEDICINE

## 2019-08-28 PROCEDURE — 87040 BLOOD CULTURE FOR BACTERIA: CPT | Mod: 59

## 2019-08-28 PROCEDURE — 83605 ASSAY OF LACTIC ACID: CPT

## 2019-08-28 PROCEDURE — 94761 N-INVAS EAR/PLS OXIMETRY MLT: CPT

## 2019-08-28 PROCEDURE — 96367 TX/PROPH/DG ADDL SEQ IV INF: CPT

## 2019-08-28 PROCEDURE — 87086 URINE CULTURE/COLONY COUNT: CPT

## 2019-08-28 PROCEDURE — 96361 HYDRATE IV INFUSION ADD-ON: CPT

## 2019-08-28 PROCEDURE — 25000003 PHARM REV CODE 250: Performed by: EMERGENCY MEDICINE

## 2019-08-28 PROCEDURE — 93005 ELECTROCARDIOGRAM TRACING: CPT

## 2019-08-28 PROCEDURE — 96365 THER/PROPH/DIAG IV INF INIT: CPT

## 2019-08-28 PROCEDURE — 63600175 PHARM REV CODE 636 W HCPCS: Performed by: PHYSICIAN ASSISTANT

## 2019-08-28 PROCEDURE — 96366 THER/PROPH/DIAG IV INF ADDON: CPT

## 2019-08-28 RX ORDER — ACETAMINOPHEN 325 MG/1
650 TABLET ORAL EVERY 4 HOURS PRN
Status: DISCONTINUED | OUTPATIENT
Start: 2019-08-28 | End: 2019-09-04

## 2019-08-28 RX ORDER — ATORVASTATIN CALCIUM 20 MG/1
80 TABLET, FILM COATED ORAL DAILY
Status: DISCONTINUED | OUTPATIENT
Start: 2019-08-28 | End: 2019-09-09 | Stop reason: HOSPADM

## 2019-08-28 RX ORDER — FAMOTIDINE 20 MG/1
20 TABLET, FILM COATED ORAL DAILY
Status: DISCONTINUED | OUTPATIENT
Start: 2019-08-28 | End: 2019-09-09 | Stop reason: HOSPADM

## 2019-08-28 RX ORDER — BISACODYL 10 MG
10 SUPPOSITORY, RECTAL RECTAL DAILY PRN
Status: DISCONTINUED | OUTPATIENT
Start: 2019-08-28 | End: 2019-09-05

## 2019-08-28 RX ORDER — METOPROLOL TARTRATE 25 MG/1
25 TABLET, FILM COATED ORAL 2 TIMES DAILY
Status: DISCONTINUED | OUTPATIENT
Start: 2019-08-28 | End: 2019-08-28

## 2019-08-28 RX ORDER — ASPIRIN 325 MG
325 TABLET, DELAYED RELEASE (ENTERIC COATED) ORAL DAILY
Status: DISCONTINUED | OUTPATIENT
Start: 2019-08-28 | End: 2019-09-09 | Stop reason: HOSPADM

## 2019-08-28 RX ORDER — MAGNESIUM SULFATE HEPTAHYDRATE 40 MG/ML
2 INJECTION, SOLUTION INTRAVENOUS ONCE
Status: COMPLETED | OUTPATIENT
Start: 2019-08-28 | End: 2019-08-28

## 2019-08-28 RX ORDER — ONDANSETRON 4 MG/1
8 TABLET, ORALLY DISINTEGRATING ORAL EVERY 8 HOURS PRN
Status: DISCONTINUED | OUTPATIENT
Start: 2019-08-28 | End: 2019-09-09 | Stop reason: HOSPADM

## 2019-08-28 RX ORDER — ACETAMINOPHEN 500 MG
1000 TABLET ORAL
Status: COMPLETED | OUTPATIENT
Start: 2019-08-28 | End: 2019-08-28

## 2019-08-28 RX ORDER — GLUCAGON 1 MG
1 KIT INJECTION
Status: DISCONTINUED | OUTPATIENT
Start: 2019-08-28 | End: 2019-09-03

## 2019-08-28 RX ORDER — SODIUM CHLORIDE 9 MG/ML
INJECTION, SOLUTION INTRAVENOUS CONTINUOUS
Status: DISCONTINUED | OUTPATIENT
Start: 2019-08-28 | End: 2019-08-31

## 2019-08-28 RX ORDER — OXYCODONE HYDROCHLORIDE 5 MG/1
5 TABLET ORAL EVERY 4 HOURS PRN
Status: DISCONTINUED | OUTPATIENT
Start: 2019-08-28 | End: 2019-09-05

## 2019-08-28 RX ORDER — SODIUM CHLORIDE 0.9 % (FLUSH) 0.9 %
10 SYRINGE (ML) INJECTION
Status: DISCONTINUED | OUTPATIENT
Start: 2019-08-28 | End: 2019-09-09 | Stop reason: HOSPADM

## 2019-08-28 RX ORDER — INSULIN ASPART 100 [IU]/ML
1-10 INJECTION, SOLUTION INTRAVENOUS; SUBCUTANEOUS EVERY 6 HOURS PRN
Status: DISCONTINUED | OUTPATIENT
Start: 2019-08-28 | End: 2019-09-03

## 2019-08-28 RX ORDER — VANCOMYCIN 2 GRAM/500 ML IN 0.9 % SODIUM CHLORIDE INTRAVENOUS
20 ONCE
Status: DISCONTINUED | OUTPATIENT
Start: 2019-08-28 | End: 2019-08-28

## 2019-08-28 RX ORDER — ONDANSETRON 4 MG/1
4 TABLET, ORALLY DISINTEGRATING ORAL
Status: COMPLETED | OUTPATIENT
Start: 2019-08-28 | End: 2019-08-28

## 2019-08-28 RX ORDER — POLYETHYLENE GLYCOL 3350 17 G/17G
17 POWDER, FOR SOLUTION ORAL DAILY
Status: DISCONTINUED | OUTPATIENT
Start: 2019-08-28 | End: 2019-09-06

## 2019-08-28 RX ORDER — AMOXICILLIN 250 MG
1 CAPSULE ORAL 2 TIMES DAILY
Status: DISCONTINUED | OUTPATIENT
Start: 2019-08-28 | End: 2019-09-06

## 2019-08-28 RX ADMIN — INSULIN ASPART 4 UNITS: 100 INJECTION, SOLUTION INTRAVENOUS; SUBCUTANEOUS at 11:08

## 2019-08-28 RX ADMIN — INSULIN ASPART 4 UNITS: 100 INJECTION, SOLUTION INTRAVENOUS; SUBCUTANEOUS at 06:08

## 2019-08-28 RX ADMIN — ONDANSETRON 4 MG: 4 TABLET, ORALLY DISINTEGRATING ORAL at 12:08

## 2019-08-28 RX ADMIN — SENNOSIDES,DOCUSATE SODIUM 1 TABLET: 8.6; 5 TABLET, FILM COATED ORAL at 12:08

## 2019-08-28 RX ADMIN — ASPIRIN 325 MG: 325 TABLET, DELAYED RELEASE ORAL at 12:08

## 2019-08-28 RX ADMIN — PIPERACILLIN AND TAZOBACTAM 4.5 G: 4; .5 INJECTION, POWDER, LYOPHILIZED, FOR SOLUTION INTRAVENOUS; PARENTERAL at 12:08

## 2019-08-28 RX ADMIN — ACETAMINOPHEN 1000 MG: 500 TABLET ORAL at 12:08

## 2019-08-28 RX ADMIN — SODIUM CHLORIDE 1000 ML: 0.9 INJECTION, SOLUTION INTRAVENOUS at 12:08

## 2019-08-28 RX ADMIN — SODIUM CHLORIDE: 0.9 INJECTION, SOLUTION INTRAVENOUS at 06:08

## 2019-08-28 RX ADMIN — INSULIN ASPART 6 UNITS: 100 INJECTION, SOLUTION INTRAVENOUS; SUBCUTANEOUS at 01:08

## 2019-08-28 RX ADMIN — FAMOTIDINE 20 MG: 20 TABLET, FILM COATED ORAL at 12:08

## 2019-08-28 RX ADMIN — MAGNESIUM SULFATE IN WATER 2 G: 40 INJECTION, SOLUTION INTRAVENOUS at 02:08

## 2019-08-28 RX ADMIN — VANCOMYCIN HYDROCHLORIDE 2000 MG: 100 INJECTION, POWDER, LYOPHILIZED, FOR SOLUTION INTRAVENOUS at 01:08

## 2019-08-28 RX ADMIN — SODIUM CHLORIDE, SODIUM LACTATE, POTASSIUM CHLORIDE, AND CALCIUM CHLORIDE 1000 ML: .6; .31; .03; .02 INJECTION, SOLUTION INTRAVENOUS at 03:08

## 2019-08-28 RX ADMIN — ATORVASTATIN CALCIUM 80 MG: 20 TABLET, FILM COATED ORAL at 10:08

## 2019-08-28 RX ADMIN — PIPERACILLIN AND TAZOBACTAM 4.5 G: 4; .5 INJECTION, POWDER, LYOPHILIZED, FOR SOLUTION INTRAVENOUS; PARENTERAL at 04:08

## 2019-08-28 RX ADMIN — SODIUM CHLORIDE, SODIUM LACTATE, POTASSIUM CHLORIDE, AND CALCIUM CHLORIDE 1000 ML: .6; .31; .03; .02 INJECTION, SOLUTION INTRAVENOUS at 12:08

## 2019-08-28 RX ADMIN — PIPERACILLIN AND TAZOBACTAM 4.5 G: 4; .5 INJECTION, POWDER, LYOPHILIZED, FOR SOLUTION INTRAVENOUS; PARENTERAL at 10:08

## 2019-08-28 NOTE — ED NOTES
The patient is resting at this time. No apparent distress noted. Airway is open and patent.  Respirations with normal effort and rate noted. Explanation of care provided to family. No needs at this time. Will continue to monitor. Pt still in trendelenburg position at this time.

## 2019-08-28 NOTE — PLAN OF CARE
08/28/19 1146   Discharge Assessment   Assessment Type Discharge Planning Assessment   Confirmed/corrected address and phone number on facesheet? Yes   Assessment information obtained from? Medical Record   Expected Length of Stay (days) 5   Communicated expected length of stay with patient/caregiver yes   Prior to hospitilization cognitive status: Alert/Oriented   Prior to hospitalization functional status: Needs Assistance;Assistive Equipment   Current cognitive status: Alert/Oriented   Current Functional Status: Needs Assistance;Assistive Equipment   Facility Arrived From: home via ED   Lives With spouse;child(rebeca), adult  (Lives wihtspouse, son and DIL)   Able to Return to Prior Arrangements no  (D/C to home w/HH may require SNF)   Is patient able to care for self after discharge? No   Who are your caregiver(s) and their phone number(s)? spouse Teddy Romero [phone]   Readmission Within the Last 30 Days other (see comments)  (Pt with mechanical fall x2 at home. Has not been eating and is not ambulating in the home per discharge instruction due to weakness)   Patient currently being followed by outpatient case management? No   Patient currently receives any other outside agency services? No   Equipment Currently Used at Home walker, rolling;cane, straight;CPAP   Do you have any problems affording any of your prescribed medications? No   Is the patient taking medications as prescribed? yes   Does the patient have transportation home? Yes   Transportation Anticipated family or friend will provide;other (see comments)  (May require wheelchair van transport to SNF)   Does the patient receive services at the Coumadin Clinic? No   Discharge Plan A Skilled Nursing Facility  (No PT/OT recs at this time however, the CTS team anticipates the dishcarge plan A to be SS SNF)   Discharge Plan B Rehab   Patient/Family in Agreement with Plan other (see comments)  (Will discuss with patinet/caregiver once stable in  ICU or stepped down. Awaiting PT/OT recs to begin discussions with family regarding SNF vs Rehab instead of rreturning to home w/HH)     Pt is well known to this  from recent admit for CABG. She lives w/her spouse, son, and DIL in [place], [place] and has support and transportation. She has two mechanical falls in the home prior to admit. She has not been ambulating per discharge instructions d/t weakness. Her appetite has been poor. She admitted via the ED for sepsis. CT of the head/neck and chest performed no fractures or ICH noted. Pt with basilar atelectasis on chest CT noted per MD impression. She was encouraged to use her IS frequently throughout her previous admission. She will need to continue to be encouraged to do so. PT/OT to be consulted to help with discharge dispo. Plan A will likely be SS SNF vs Plan B IRF. Will continue to follow and help with discharge planning throughout admission. On her previous admit she was discharged with HH set up by PHN with [facility] and the inpatient meal plan program for N.       [id number] - [address] AT [facility] OF [address] [address]  Phone: [phone] Fax: [phone]    PCP  Liz Roberts MD    General Cardiologist:  Dr. Pop Henderson    Payor: [employer] MANAGED MEDICARE / Plan: [employer] CHOICES 65 / Product Type: Medicare Advantage /

## 2019-08-28 NOTE — PROGRESS NOTES
Pharmacist Renal Dose Adjustment Note    Kaylee Romero is a 74 y.o. female being treated with the medication Zosyn    Patient Data:    Vital Signs (Most Recent):  Temp: 97.7 °F (36.5 °C) (08/28/19 0527)  Pulse: 71 (08/28/19 0621)  Resp: 13 (08/28/19 0621)  BP: 115/60 (08/28/19 0621)  SpO2: 99 % (08/28/19 0621) Vital Signs (72h Range):  Temp:  [97.7 °F (36.5 °C)-102.4 °F (39.1 °C)]   Pulse:  []   Resp:  [13-24]   BP: ()/(52-88)   SpO2:  [90 %-100 %]      Recent Labs   Lab 08/28/19 0036   CREATININE 1.9*     Serum creatinine: 1.9 mg/dL (H) 08/28/19 0036  Estimated creatinine clearance: 31.3 mL/min (A)    Zosyn 4.5g q12 hours will be changed to 4.5g every 8 hours.     Pharmacist's Name: Amy Lee  Pharmacist's Extension: [phone]

## 2019-08-28 NOTE — PROGRESS NOTES
Pharmacokinetic Initial Assessment: IV Vancomycin    Assessment/Plan:    Initiate intravenous vancomycin with loading dose of 2000 mg once with subsequent doses when random concentrations are less than 20 mcg/mL ; due to unstable renal function.  Desired empiric serum trough concentration is 10 to 20 mcg/mL  Draw vancomycin random level on 08/29 at 0400.  Pharmacy will continue to follow and monitor vancomycin.      Please contact pharmacy at extension [phone] with any questions regarding this assessment.     Thank you for the consult,   Ayan CARRENO Braulio       Patient brief summary:  Kaylee Romero is a 74 y.o. female initiated on antimicrobial therapy with IV Vancomycin for treatment of suspected skin & soft tissue infection    Drug Allergies:   Review of patient's allergies indicates:   Allergen Reactions    Bactrim [sulfamethoxazole-trimethoprim] Other (See Comments)     Generic version  Sulfa makes her sick    Keflex [cephalexin] Other (See Comments)     Turns orange       Actual Body Weight:   99.8 kg    Renal Function:   Estimated Creatinine Clearance: 31.3 mL/min (A) (based on SCr of 1.9 mg/dL (H)).,     Dialysis Method (if applicable):  N/A    CBC (last 72 hours):  Recent Labs   Lab Result Units 08/28/19  0036   WBC K/uL 15.24*   Hemoglobin g/dL 8.5*   Hematocrit % 26.9*   Platelets K/uL 328   Gran% % 88.7*   Lymph% % 3.3*   Mono% % 7.1   Eosinophil% % 0.0   Basophil% % 0.1   Differential Method  Automated       Metabolic Panel (last 72 hours):  Recent Labs   Lab Result Units 08/28/19  0036   Sodium mmol/L 130*   Potassium mmol/L 4.4   Chloride mmol/L 95   CO2 mmol/L 23   Glucose mg/dL 291*   BUN, Bld mg/dL 28*   Creatinine mg/dL 1.9*   Albumin g/dL 2.8*   Total Bilirubin mg/dL 1.1*   Alkaline Phosphatase U/L 92   AST U/L 22   ALT U/L 21       Drug levels (last 3 results):  No results for input(s): VANCOMYCINRA, VANCOMYCINPE, VANCOMYCINTR in the last 72 hours.    Microbiologic Results:  Microbiology  Results (last 7 days)     Procedure Component Value Units Date/Time    Blood culture x two cultures. Draw prior to antibiotics. [[id number]] Collected:  08/28/19 0036    Order Status:  Sent Specimen:  Blood from Peripheral, Hand, Right Updated:  08/28/19 0051    Blood culture x two cultures. Draw prior to antibiotics. [[id number]] Collected:  08/28/19 0036    Order Status:  Sent Specimen:  Blood from Peripheral, Hand, Left Updated:  08/28/19 0050

## 2019-08-28 NOTE — PROGRESS NOTES
Pharmacist Renal Dose Adjustment Note    Kaylee Romero is a 74 y.o. female being treated with the medication famotidine    Patient Data:    Vital Signs (Most Recent):  Temp: 97.7 °F (36.5 °C) (08/28/19 0527)  Pulse: 71 (08/28/19 0621)  Resp: 13 (08/28/19 0621)  BP: 115/60 (08/28/19 0621)  SpO2: 99 % (08/28/19 0621) Vital Signs (72h Range):  Temp:  [97.7 °F (36.5 °C)-102.4 °F (39.1 °C)]   Pulse:  []   Resp:  [13-24]   BP: ()/(52-88)   SpO2:  [90 %-100 %]      Recent Labs   Lab 08/28/19 0036   CREATININE 1.9*     Serum creatinine: 1.9 mg/dL (H) 08/28/19 0036  Estimated creatinine clearance: 31.3 mL/min (A)    Famotidine 20mg twice daily will be changed to 20mg daily.    Pharmacist's Name: Amy Lee  Pharmacist's Extension: [phone]

## 2019-08-28 NOTE — PROVIDER PROGRESS NOTES - EMERGENCY DEPT.
Patient signed out to me pending CT surgery evaluation.  In brief she has been febrile, weak, elevated white count, she is being treated for sepsis.  Approximately 430 the patient began to become hypotensive, and I was alerted by the nursing that her pressure was in 80s systolic.  On my evaluation she has had no change in her mental status, heart rate remains in the 70s, she has gotten approximately 1 L normal saline, and the fluids from both vancomycin and Zosyn.  I instructed the nurse to start another L saline, bedside ultrasound shows non collapsible IVC, no pulmonary B lines.  Oxygen saturation remains on 100% on oxygen.  I discussed these findings with the CT surgery fellow, who recommended a bedside ultrasound to assess for tamponade. There was none.  After approximately 300 cc of fluid pressures began to improve, after approximately 100 cc fluid pressure is again 110 systolic, other vitals unchanged.  Patient will be admitted as per previous

## 2019-08-28 NOTE — ED PROVIDER NOTES
Encounter Date: 8/27/2019       History     Chief Complaint   Patient presents with    Fall     patient slipped and fell out of bed this evening onto her bottom; denies neck or back pain.  Patient had a CABG 8/12 and hasn't been eating since per her .  Patient also fell yesterday onto her face.  Unknown if she lost consciousness.  Patient has febrile and tachypneic on scene.       74-year-old female to the ER for evaluation generalized malaise, fatigue, weakness, decreased p.o. appetite, multiple falls.  EMS contacted by the patient's .  The patient had a CABG 2 weeks ago performed here with Dr. Ventura.  Since the CABG she has had decreased p.o. intake nausea vomiting and weakness.  Yesterday the patient had a mechanical slip and fall secondary to not feeling well and feeling weak, she fell and hit her face, there was no LOC.   Tonight she fell out of the bed again and hit her face again, she also complains of pain to both shoulders.  She appears ill.   She presents to the ER febrile, tachycardic, tachypneic, and hypoxic.   EMS as the patient on 2 L nasal cannula with oxygenation around of 90s.  On room air her oxygenation was low 80s.         Review of patient's allergies indicates:   Allergen Reactions    Bactrim [sulfamethoxazole-trimethoprim] Other (See Comments)     Generic version  Sulfa makes her sick    Keflex [cephalexin] Other (See Comments)     Turns orange     Past Medical History:   Diagnosis Date    Acid reflux     Age-related osteoporosis without current pathological fracture 1/11/2019    Cataract     CKD (chronic kidney disease) stage 3, GFR 30-59 ml/min     Dry mouth     History of TIA (transient ischemic attack) 12/11/2018    Hyperlipidemia 12/2/2014    Hypertensive heart disease with diastolic heart failure 11/28/2012    Morbid obesity with body mass index (BMI) of 40.0 or higher 5/9/2016    KAMRAN (obstructive sleep apnea)     KAMRAN on CPAP 6/28/2016    Osteoporosis without  current pathological fracture 2018    Physical deconditioning 2018    Status post total left knee replacement 2017    Type 2 diabetes mellitus with stage 3 chronic kidney disease, without long-term current use of insulin 2019     Past Surgical History:   Procedure Laterality Date    ankle surgery (l)      APPENDECTOMY       SECTION      CORONARY ARTERY BYPASS GRAFT (CABG)X3 N/A 2019    Performed by [facility] at [facility] OR 2ND FLR    DILATION AND CURETTAGE OF UTERUS      KNEE ARTHROSCOPY W/ DEBRIDEMENT      KNEE SURGERY Left 2017    TKR    Left heart cath Left 2019    Performed by Ronak Gibbons MD at [facility] CATH LAB    REPLACEMENT-KNEE-TOTAL Left 2017    Performed by [facility] at [facility] OR 2ND FLR    SURGICAL PROCUREMENT, VEIN, ENDOSCOPIC Left 2019    Performed by Peterson [facility]ura MD at [facility] OR 2ND FLR     Family History   Problem Relation Age of Onset    Heart disease Mother     Heart failure Mother     Heart disease Father     Stroke Father     Heart failure Father     Diabetes Father     Stroke Paternal Grandfather     No Known Problems Brother     No Known Problems Son     Breast cancer Neg Hx     Colon cancer Neg Hx     Ovarian cancer Neg Hx     Amblyopia Neg Hx     Blindness Neg Hx     Cancer Neg Hx     Cataracts Neg Hx     Glaucoma Neg Hx     Hypertension Neg Hx     Macular degeneration Neg Hx     Retinal detachment Neg Hx     Strabismus Neg Hx     Thyroid disease Neg Hx      Social History     Tobacco Use    Smoking status: Never Smoker    Smokeless tobacco: Never Used   Substance Use Topics    Alcohol use: Yes     Alcohol/week: 0.0 oz     Comment: social drinker    Drug use: No     Review of Systems   Constitutional: Positive for chills, fatigue and fever.   HENT: Positive for facial swelling. Negative for sore throat.    Respiratory: Negative for shortness of breath.     Cardiovascular: Negative for chest pain.   Gastrointestinal: Negative for nausea.   Genitourinary: Negative for dysuria.   Musculoskeletal: Negative for back pain.   Skin: Positive for color change. Negative for rash.   Neurological: Negative for weakness.   Hematological: Does not bruise/bleed easily.       Physical Exam     Initial Vitals [08/27/19 2319]   BP Pulse Resp Temp SpO2   (!) 146/88 104 (!) 24 (!) 102.4 °F (39.1 °C) (!) 90 %      MAP       --         Physical Exam    Constitutional: Vital signs are normal. She appears well-developed and well-nourished. She is not diaphoretic. She appears distressed.   HENT:   Head: Normocephalic.   Right Ear: External ear normal.   Left Ear: External ear normal.   Mouth/Throat: Oropharynx is clear and moist.   Significant swelling and bruising noted over the face primarily around the eyes over the bridge of the nose and the bilateral zygomatic arches  Significant tenderness over this area  No TMJ tenderness  The no jaw tenderness   Eyes: Conjunctivae and EOM are normal. Pupils are equal, round, and reactive to light.   Neck:   Nontender cervical spine   Cardiovascular: Normal rate and regular rhythm.   Pulmonary/Chest:   Course breath sounds on the left side  Incision over the chest appears to be healing well no signs of infection   Abdominal: Soft. Normal appearance and bowel sounds are normal.   Abdomen is soft and nontender   Musculoskeletal: Normal range of motion.   Neurological: She is alert and oriented to person, place, and time.   Skin: Skin is warm and intact.   Left lower leg incision proximal to the knee  The area is warm red tender and swollen and tense  Bedside ultrasound reveals a fluid collection    Psychiatric: She has a normal mood and affect. Her speech is normal and behavior is normal. Cognition and memory are normal.         ED Course   Critical Care  Date/Time: 8/28/2019 12:53 AM  Performed by: Leonard Bermudez PA-C  Authorized by: Jah MCKEE  MD Jessica   Direct patient critical care time: 20 minutes  Additional history critical care time: 10 minutes  Ordering / reviewing critical care time: 10 minutes  Documentation critical care time: 10 minutes  Consulting other physicians critical care time: 5 minutes  Consult with family critical care time: 5 minutes  Total critical care time (exclusive of procedural time) : 60 minutes  Critical care was necessary to treat or prevent imminent or life-threatening deterioration of the following conditions: sepsis.  Critical care was time spent personally by me on the following activities: examination of patient, review of old charts and pulse oximetry.        Labs Reviewed   CBC W/ AUTO DIFFERENTIAL - Abnormal; Notable for the following components:       Result Value    WBC 15.24 (*)     RBC 3.07 (*)     Hemoglobin 8.5 (*)     Hematocrit 26.9 (*)     Mean Corpuscular Hemoglobin Conc 31.6 (*)     Immature Granulocytes 0.8 (*)     Gran # (ANC) 13.5 (*)     Immature Grans (Abs) 0.12 (*)     Lymph # 0.5 (*)     Mono # 1.1 (*)     Gran% 88.7 (*)     Lymph% 3.3 (*)     All other components within normal limits   COMPREHENSIVE METABOLIC PANEL - Abnormal; Notable for the following components:    Sodium 130 (*)     Glucose 291 (*)     BUN, Bld 28 (*)     Creatinine 1.9 (*)     Albumin 2.8 (*)     Total Bilirubin 1.1 (*)     eGFR if  29.9 (*)     eGFR if non  26.0 (*)     All other components within normal limits   CULTURE, BLOOD   CULTURE, BLOOD   LACTIC ACID, PLASMA   URINALYSIS, REFLEX TO URINE CULTURE   LACTIC ACID, PLASMA     EKG Readings: (Independently Interpreted)   Normal sinus rhythm, no STEMI       Imaging Results          CT Chest Without Contrast (In process)                CT Head Without Contrast (Final result)  Result time 08/28/19 00:34:47    Final result by Pricilla Vargas MD (08/28/19 00:34:47)                 Impression:      1. Inferior midline frontal scalp hematoma  involving the superior nasal soft tissues.  No CT evidence of acute intracranial abnormality. Clinical correlation and further evaluation as warranted.  2. No acute maxillofacial fracture.      Electronically signed by: Pricilla Vargas MD  Date:    08/28/2019  Time:    00:34             Narrative:    EXAMINATION:  CT HEAD WITHOUT CONTRAST; CT MAXILLOFACIAL WITHOUT CONTRAST    CLINICAL HISTORY:  Maxface trauma blunt;    TECHNIQUE:  Low dose axial images were obtained through the head and maxillofacial region.  Coronal and sagittal reformations were also performed. Contrast was not administered.    COMPARISON:  CTA head 12/19/2018    FINDINGS:  Head CT:    Inferior midline frontal scalp hematoma involving the superior aspect of the nasal soft tissues.  There is no acute intracranial hemorrhage, hydrocephalus, midline shift or mass effect. Gray-white matter differentiation appears maintained. The basal cisterns are patent. The mastoid air cells are essentially clear.  The visualized bones of the calvarium demonstrate no acute osseous abnormality, noting there is hyperostosis frontalis.    Maxillofacial CT:    There is no fracture of the maxillofacial region.  Specifically, the orbital walls, paranasal sinus walls, nasal bones, zygomatic arches, pterygoid plates, maxilla, and mandible are intact. The mandibular condyles are normal in location.    Paranasal sinuses are clear of acute process noting mild mucosal thickening of a few ethmoid air cells.  No paranasal sinus air-fluid levels.  Incidentally noted torus palatinus is present.    The globes are intact, and there is no retrobulbar hematoma or abnormality of the optic nerves or the extraocular muscles.                               CT Maxillofacial Without Contrast (Final result)  Result time 08/28/19 00:34:47    Final result by Pricilla Vargas MD (08/28/19 00:34:47)                 Impression:      1. Inferior midline frontal scalp hematoma involving the superior  nasal soft tissues.  No CT evidence of acute intracranial abnormality. Clinical correlation and further evaluation as warranted.  2. No acute maxillofacial fracture.      Electronically signed by: Pricilla Vargas MD  Date:    08/28/2019  Time:    00:34             Narrative:    EXAMINATION:  CT HEAD WITHOUT CONTRAST; CT MAXILLOFACIAL WITHOUT CONTRAST    CLINICAL HISTORY:  Maxface trauma blunt;    TECHNIQUE:  Low dose axial images were obtained through the head and maxillofacial region.  Coronal and sagittal reformations were also performed. Contrast was not administered.    COMPARISON:  CTA head 12/19/2018    FINDINGS:  Head CT:    Inferior midline frontal scalp hematoma involving the superior aspect of the nasal soft tissues.  There is no acute intracranial hemorrhage, hydrocephalus, midline shift or mass effect. Gray-white matter differentiation appears maintained. The basal cisterns are patent. The mastoid air cells are essentially clear.  The visualized bones of the calvarium demonstrate no acute osseous abnormality, noting there is hyperostosis frontalis.    Maxillofacial CT:    There is no fracture of the maxillofacial region.  Specifically, the orbital walls, paranasal sinus walls, nasal bones, zygomatic arches, pterygoid plates, maxilla, and mandible are intact. The mandibular condyles are normal in location.    Paranasal sinuses are clear of acute process noting mild mucosal thickening of a few ethmoid air cells.  No paranasal sinus air-fluid levels.  Incidentally noted torus palatinus is present.    The globes are intact, and there is no retrobulbar hematoma or abnormality of the optic nerves or the extraocular muscles.                               X-Ray Chest AP Portable (Final result)  Result time 08/28/19 00:15:59    Final result by Maximus Guzman MD (08/28/19 00:15:59)                 Impression:      No significant change from prior study.      Electronically signed by: Maximus Guzman  MD  Date:    08/28/2019  Time:    00:15             Narrative:    EXAMINATION:  XR CHEST AP PORTABLE    CLINICAL HISTORY:  Sepsis;    TECHNIQUE:  Single frontal view of the chest was performed.    COMPARISON:  August 17, 2019.    FINDINGS:  Sternotomy wires present.    Underinflated lungs with mild bibasilar subsegmental atelectatic change, similar to prior.    Heart and lungs  appear unchanged when allowing for differences in technique and positioning.                               X-Ray Shoulder Trauma Bilateral (Final result)  Result time 08/28/19 00:21:03    Final result by Maximus Guzman MD (08/28/19 00:21:03)                 Impression:      No acute fracture or dislocation.  Mild degenerative changes, similar to prior exams.      Electronically signed by: Maximus Guzman MD  Date:    08/28/2019  Time:    00:21             Narrative:    EXAMINATION:  XR SHOULDER TRAUMA 3 VIEW BILATERAL    CLINICAL HISTORY:  Unspecified fall, initial encounter    TECHNIQUE:  Three views of each shoulder were performed.    COMPARISON:  October 4, 2018.  January 3, 2018.  Is    FINDINGS:  No acute fracture or dislocation on either side.  Mild degenerative changes, similar to prior exams.  Previous soft tissue calcification adjacent to the right humeral head no longer identified.                              X-Rays:   Independently Interpreted Readings:   Other Readings:  Increased interstitial lung markings    Medical Decision Making:   History:   Old Medical Records: I decided to obtain old medical records.  Old Records Summarized: other records.  Initial Assessment:   74-year-old female who appears, SIRS criteria on arrival, with likely source identified as pulmonary or left lower leg skin, concerning for infection, sepsis  Trauma to the face concerning for fracture  Differential Diagnosis:   Fracture, contusion, dislocation, sepsis, cellulitis, abscess, PE, pneumonia  Independently Interpreted Test(s):   I have ordered and  independently interpreted X-rays - see prior notes.  I have ordered and independently interpreted EKG Reading(s) - see prior notes  Clinical Tests:   Lab Tests: Ordered and Reviewed  Radiological Study: Ordered and Reviewed  Medical Tests: Ordered and Reviewed  ED Management:  74-year-old female with SIRS criteria on arrival, possible source of infection left lower extremity, medial thigh, likely cellulitis with postop abscess  Bedside ultrasound showing fluid collection under incision site.   Patient given broad-spectrum antibiotics,   Aggressive fluid resuscitation other the patient is not hypotensive, no signs of shock  I will discuss the case with CTS surgery    No acute findings on CT imaging of the face or head  No Focal pneumonia on CXR  Pt still requiring supplemental O2 to stay above 90%  Case discussed with CTS surgery  Suspect etiology of sepsis secondary to wound on the patient's left lower extremity.   Patient's kidney function is slightly diminished, she is dehydrated has been given fluids.  Decision made to get a CT scan of the chest noncontrast for further evaluation.   CT surgery will come see the patient, anticipate admission to their service pending consult  Other:   I discussed test(s) with the performing physician.  I have discussed this case with another health care provider.              Attending Attestation:   Physician Attestation Statement for Resident:  As the supervising MD   Physician Attestation Statement: I have personally seen and examined this patient.    As the supervising MD I agree with the above PE.   -: Induration at left medial thigh, min erythema. Could be a fluid collection there as a source of infection.    As the supervising MD I agree with the above treatment, course, plan, and disposition.                       Clinical Impression:       ICD-10-CM ICD-9-CM   1. Sepsis, due to unspecified organism A41.9 038.9     995.91   2. SIRS (systemic inflammatory response syndrome)  R65.10 995.90   3. Fall W19.XXXA E888.9         Disposition:   Disposition: Admitted  Condition: Serious                        Jah Lopez MD  08/28/19 0145       Leonard Bermudez PA-C  08/28/19 0328

## 2019-08-28 NOTE — PROGRESS NOTES
[facility]  Critical Care - Surgery  Progress Note    Patient Name: Kaylee Romero  MRN: [id number]  Admission Date: 8/27/2019  Hospital Length of Stay: 0 days  Code Status: Full Code  Attending Provider: Peterson Ventura MD  Primary Care Provider: Liz Roberts MD   Principal Problem: <principal problem not specified>    Subjective:     Hospital/ICU Course:  No notes on file    Interval History/Significant Events:     On presentation she was febrile, tachycardic, tachypneic, and hypotensive per ED documentation. She recieved 2L NS bolus with resolution in her hemodynamic derangement. She was started on broad spectrum antibiotics.        Objective:     Vital Signs (Most Recent):  Temp: 99.1 °F (37.3 °C) (08/28/19 1115)  Pulse: 72 (08/28/19 0745)  Resp: 16 (08/28/19 0745)  BP: 115/60 (08/28/19 0621)  SpO2: 99 % (08/28/19 0621) Vital Signs (24h Range):  Temp:  [97.7 °F (36.5 °C)-102.4 °F (39.1 °C)] 99.1 °F (37.3 °C)  Pulse:  [] 72  Resp:  [13-24] 16  SpO2:  [90 %-100 %] 99 %  BP: ()/(52-88) 115/60     Weight: 99.8 kg (220 lb)  Body mass index is 36.61 kg/m².      Intake/Output Summary (Last 24 hours) at 8/28/2019 1148  Last data filed at 8/28/2019 0146  Gross per 24 hour   Intake 100 ml   Output --   Net 100 ml       Physical Exam   Constitutional: She appears well-developed and well-nourished. No distress.   HENT:   Head: Normocephalic.   No posterior oropharyngeal exudate. Bruising over maxilla bilaterally and over her eyes with hematoma formation over bridge of nose.   Eyes:   Anicteric Sclera, normal conjunctiva. No discharge   Neck:   No JVD. + hepatojugular reflux. No cervical lymphadenopathy    Cardiovascular:   RRR, Intact distal pulses   Pulmonary/Chest:   CTAB, no increased work of breathing   Abdominal: Soft. She exhibits no distension.   Neurological:   Alert, Oriented x3   Skin: Skin is warm. She is not diaphoretic.   1+ pitting edema to mid leg.  Left leg with  erythema, tenderness, swelling proximal to knee   Nursing note and vitals reviewed.      Vents:       Lines/Drains/Airways     Peripheral Intravenous Line                 Peripheral IV - Single Lumen 08/28/19 0421 20 G Right Upper Arm less than 1 day         Peripheral IV - Single Lumen 08/28/19 0501 20 G Left Antecubital less than 1 day                Significant Labs:    CBC/Anemia Profile:  Recent Labs   Lab 08/28/19  0036 08/28/19  0731   WBC 15.24* 10.62   HGB 8.5* 8.3*   HCT 26.9* 25.6*    329   MCV 88 85   RDW 14.5 14.5        Chemistries:  Recent Labs   Lab 08/28/19  0036 08/28/19  0731   * 133*   K 4.4 4.5   CL 95 102   CO2 23 19*   BUN 28* 28*   CREATININE 1.9* 2.0*   CALCIUM 9.8 8.6*   ALBUMIN 2.8* 2.5*   PROT 8.2 7.6   BILITOT 1.1* 1.0   ALKPHOS 92 97   ALT 21 27   AST 22 34   MG  --  1.5*   PHOS  --  4.0       All pertinent labs within the past 24 hours have been reviewed.    Significant Imaging:  I have reviewed all pertinent imaging results/findings within the past 24 hours.    Assessment/Plan:     Sepsis  75yo F post op from CABG 8/12 discharged 8/21 presenting with severe sepsis , DOREEN, and infected appearing LLE wound. Blood cultures drawn in ED + for GNR    Neuro:   Analgesia: Tylenol prn    Cardiac  S/p CABG 8/12  , Atorvastatin 80  TTE     Pulm: oxygenating adequately with NC  Continue to monitor  CT chest with stable post op changes    Renal: DOREEN on CKD 3  s/p volume resuscitation, continue to monitor  Strict I/O  qAM BMP    FEN/GI  F:S/p 2L bolus, LR 75cc/hr  E: Replete lytes K>4, Mg>2  N: CLD as tolerated    Heme/ID: Leukocytosis resolved. GNR+ Blood cultures 8/28  Continue Zosyn, Vanc  Leg ultrasound   qAM CBC    Endo: SSI    PPX: Pepcid, SQH  Dispo: Continue sicu care       Critical care was time spent personally by me on the following activities: development of treatment plan with patient or surrogate and bedside caregivers, discussions with consultants, evaluation of  patient's response to treatment, examination of patient, ordering and performing treatments and interventions, ordering and review of laboratory studies, ordering and review of radiographic studies, pulse oximetry, re-evaluation of patient's condition.  This critical care time did not overlap with that of any other provider or involve time for any procedures.     Armando Delacruz MD  Critical Care - Surgery  [facility]

## 2019-08-28 NOTE — PLAN OF CARE
Problem: Adult Inpatient Plan of Care  Goal: Plan of Care Review  Outcome: Ongoing (interventions implemented as appropriate)  Pt. resting comfortably in bed, spouse at bedside.  AAOx4, but difficult to arouse.  Increasingly lethargic, MD aware.  Sats > 98% on 3 L NC.  2 L LR administered for tachycardia and hypotension.  Per MD, no narcotics for pain.  Tylenol only.  SILVA and LLE US performed.  UA completed.  Blood cultures positive for gram negative anaerobic rods.  Skin free from new breakdown.  Turned q2h.  POC reviewed c pt. and spouse.  See flowsheets for more details.  Will continue to monitor.

## 2019-08-28 NOTE — ASSESSMENT & PLAN NOTE
75yo F post op from CABG 8/12 discharged 8/21 presenting with severe sepsis , DOREEN, and infected appearing LLE wound. Blood cultures drawn in ED + for GNR    Neuro:   Analgesia: Tylenol prn    Cardiac  S/p CABG 8/12  , Atorvastatin 80  TTE     Pulm: oxygenating adequately with NC  Continue to monitor  CT chest with stable post op changes    Renal: DOREEN on CKD 3  s/p volume resuscitation, continue to monitor  Strict I/O  qAM BMP    FEN/GI  F:S/p 2L bolus, LR 75cc/hr  E: Replete lytes K>4, Mg>2  N: CLD as tolerated    Heme/ID: Leukocytosis resolved. GNR+ Blood cultures 8/28  Continue Zosyn, Vanc  Leg ultrasound   qAM CBC    Endo: SSI    PPX: Pepcid, SQH  Dispo: Continue sicu care

## 2019-08-28 NOTE — NURSING
Dr. Norris notified of anaerobic gram negative rods on blood culture from 8/28/19.  No new orders given.  See flowsheets for more details.  Will continue to monitor.

## 2019-08-28 NOTE — SUBJECTIVE & OBJECTIVE
Interval History/Significant Events:     On presentation she was febrile, tachycardic, tachypneic, and hypotensive per ED documentation. She recieved 2L NS bolus with resolution in her hemodynamic derangement. She was started on broad spectrum antibiotics.        Objective:     Vital Signs (Most Recent):  Temp: 99.1 °F (37.3 °C) (08/28/19 1115)  Pulse: 72 (08/28/19 0745)  Resp: 16 (08/28/19 0745)  BP: 115/60 (08/28/19 0621)  SpO2: 99 % (08/28/19 0621) Vital Signs (24h Range):  Temp:  [97.7 °F (36.5 °C)-102.4 °F (39.1 °C)] 99.1 °F (37.3 °C)  Pulse:  [] 72  Resp:  [13-24] 16  SpO2:  [90 %-100 %] 99 %  BP: ()/(52-88) 115/60     Weight: 99.8 kg (220 lb)  Body mass index is 36.61 kg/m².      Intake/Output Summary (Last 24 hours) at 8/28/2019 1148  Last data filed at 8/28/2019 0146  Gross per 24 hour   Intake 100 ml   Output --   Net 100 ml       Physical Exam   Constitutional: She appears well-developed and well-nourished. No distress.   HENT:   Head: Normocephalic.   No posterior oropharyngeal exudate. Bruising over maxilla bilaterally and over her eyes with hematoma formation over bridge of nose.   Eyes:   Anicteric Sclera, normal conjunctiva. No discharge   Neck:   No JVD. + hepatojugular reflux. No cervical lymphadenopathy    Cardiovascular:   RRR, Intact distal pulses   Pulmonary/Chest:   CTAB, no increased work of breathing   Abdominal: Soft. She exhibits no distension.   Neurological:   Alert, Oriented x3   Skin: Skin is warm. She is not diaphoretic.   1+ pitting edema to mid leg.  Left leg with erythema, tenderness, swelling proximal to knee   Nursing note and vitals reviewed.      Vents:       Lines/Drains/Airways     Peripheral Intravenous Line                 Peripheral IV - Single Lumen 08/28/19 0421 20 G Right Upper Arm less than 1 day         Peripheral IV - Single Lumen 08/28/19 0501 20 G Left Antecubital less than 1 day                Significant Labs:    CBC/Anemia Profile:  Recent Labs   Lab  08/28/19  0036 08/28/19  0731   WBC 15.24* 10.62   HGB 8.5* 8.3*   HCT 26.9* 25.6*    329   MCV 88 85   RDW 14.5 14.5        Chemistries:  Recent Labs   Lab 08/28/19  0036 08/28/19  0731   * 133*   K 4.4 4.5   CL 95 102   CO2 23 19*   BUN 28* 28*   CREATININE 1.9* 2.0*   CALCIUM 9.8 8.6*   ALBUMIN 2.8* 2.5*   PROT 8.2 7.6   BILITOT 1.1* 1.0   ALKPHOS 92 97   ALT 21 27   AST 22 34   MG  --  1.5*   PHOS  --  4.0       All pertinent labs within the past 24 hours have been reviewed.    Significant Imaging:  I have reviewed all pertinent imaging results/findings within the past 24 hours.

## 2019-08-29 PROBLEM — A41.9 SEPSIS: Status: RESOLVED | Noted: 2019-08-28 | Resolved: 2019-08-29

## 2019-08-29 PROBLEM — A41.9 SEPSIS: Status: ACTIVE | Noted: 2019-08-29

## 2019-08-29 LAB
ANION GAP SERPL CALC-SCNC: 11 MMOL/L (ref 8–16)
ANION GAP SERPL CALC-SCNC: 11 MMOL/L (ref 8–16)
ANION GAP SERPL CALC-SCNC: 9 MMOL/L (ref 8–16)
ANISOCYTOSIS BLD QL SMEAR: SLIGHT
BACTERIA UR CULT: NO GROWTH
BASOPHILS # BLD AUTO: 0.01 K/UL (ref 0–0.2)
BASOPHILS # BLD AUTO: 0.02 K/UL (ref 0–0.2)
BASOPHILS NFR BLD: 0.1 % (ref 0–1.9)
BASOPHILS NFR BLD: 0.2 % (ref 0–1.9)
BUN SERPL-MCNC: 18 MG/DL (ref 8–23)
BUN SERPL-MCNC: 23 MG/DL (ref 8–23)
BUN SERPL-MCNC: 23 MG/DL (ref 8–23)
BURR CELLS BLD QL SMEAR: ABNORMAL
CALCIUM SERPL-MCNC: 7.9 MG/DL (ref 8.7–10.5)
CALCIUM SERPL-MCNC: 7.9 MG/DL (ref 8.7–10.5)
CALCIUM SERPL-MCNC: 8.4 MG/DL (ref 8.7–10.5)
CHLORIDE SERPL-SCNC: 105 MMOL/L (ref 95–110)
CHLORIDE SERPL-SCNC: 105 MMOL/L (ref 95–110)
CHLORIDE SERPL-SCNC: 106 MMOL/L (ref 95–110)
CO2 SERPL-SCNC: 18 MMOL/L (ref 23–29)
CO2 SERPL-SCNC: 18 MMOL/L (ref 23–29)
CO2 SERPL-SCNC: 19 MMOL/L (ref 23–29)
CREAT SERPL-MCNC: 1.6 MG/DL (ref 0.5–1.4)
CREAT SERPL-MCNC: 1.7 MG/DL (ref 0.5–1.4)
CREAT SERPL-MCNC: 1.7 MG/DL (ref 0.5–1.4)
DIFFERENTIAL METHOD: ABNORMAL
DIFFERENTIAL METHOD: ABNORMAL
EOSINOPHIL # BLD AUTO: 0.1 K/UL (ref 0–0.5)
EOSINOPHIL # BLD AUTO: 0.1 K/UL (ref 0–0.5)
EOSINOPHIL NFR BLD: 0.9 % (ref 0–8)
EOSINOPHIL NFR BLD: 1.3 % (ref 0–8)
ERYTHROCYTE [DISTWIDTH] IN BLOOD BY AUTOMATED COUNT: 14.7 % (ref 11.5–14.5)
ERYTHROCYTE [DISTWIDTH] IN BLOOD BY AUTOMATED COUNT: 14.7 % (ref 11.5–14.5)
EST. GFR  (AFRICAN AMERICAN): 34.2 ML/MIN/1.73 M^2
EST. GFR  (AFRICAN AMERICAN): 34.2 ML/MIN/1.73 M^2
EST. GFR  (AFRICAN AMERICAN): 36.8 ML/MIN/1.73 M^2
EST. GFR  (NON AFRICAN AMERICAN): 29.7 ML/MIN/1.73 M^2
EST. GFR  (NON AFRICAN AMERICAN): 29.7 ML/MIN/1.73 M^2
EST. GFR  (NON AFRICAN AMERICAN): 32 ML/MIN/1.73 M^2
GLUCOSE SERPL-MCNC: 161 MG/DL (ref 70–110)
GLUCOSE SERPL-MCNC: 161 MG/DL (ref 70–110)
GLUCOSE SERPL-MCNC: 219 MG/DL (ref 70–110)
HCT VFR BLD AUTO: 22.6 % (ref 37–48.5)
HCT VFR BLD AUTO: 22.9 % (ref 37–48.5)
HGB BLD-MCNC: 7.1 G/DL (ref 12–16)
HGB BLD-MCNC: 7.2 G/DL (ref 12–16)
IMM GRANULOCYTES # BLD AUTO: 0.05 K/UL (ref 0–0.04)
IMM GRANULOCYTES # BLD AUTO: 0.1 K/UL (ref 0–0.04)
IMM GRANULOCYTES NFR BLD AUTO: 0.5 % (ref 0–0.5)
IMM GRANULOCYTES NFR BLD AUTO: 1.1 % (ref 0–0.5)
LYMPHOCYTES # BLD AUTO: 0.6 K/UL (ref 1–4.8)
LYMPHOCYTES # BLD AUTO: 0.7 K/UL (ref 1–4.8)
LYMPHOCYTES NFR BLD: 6.5 % (ref 18–48)
LYMPHOCYTES NFR BLD: 6.6 % (ref 18–48)
MAGNESIUM SERPL-MCNC: 1.9 MG/DL (ref 1.6–2.6)
MAGNESIUM SERPL-MCNC: 2.1 MG/DL (ref 1.6–2.6)
MCH RBC QN AUTO: 27.9 PG (ref 27–31)
MCH RBC QN AUTO: 28.1 PG (ref 27–31)
MCHC RBC AUTO-ENTMCNC: 31 G/DL (ref 32–36)
MCHC RBC AUTO-ENTMCNC: 31.9 G/DL (ref 32–36)
MCV RBC AUTO: 88 FL (ref 82–98)
MCV RBC AUTO: 91 FL (ref 82–98)
MONOCYTES # BLD AUTO: 0.6 K/UL (ref 0.3–1)
MONOCYTES # BLD AUTO: 0.7 K/UL (ref 0.3–1)
MONOCYTES NFR BLD: 6.3 % (ref 4–15)
MONOCYTES NFR BLD: 6.7 % (ref 4–15)
NEUTROPHILS # BLD AUTO: 7.8 K/UL (ref 1.8–7.7)
NEUTROPHILS # BLD AUTO: 8.7 K/UL (ref 1.8–7.7)
NEUTROPHILS NFR BLD: 84.6 % (ref 38–73)
NEUTROPHILS NFR BLD: 85.2 % (ref 38–73)
NRBC BLD-RTO: 0 /100 WBC
NRBC BLD-RTO: 0 /100 WBC
OVALOCYTES BLD QL SMEAR: ABNORMAL
PHOSPHATE SERPL-MCNC: 2.6 MG/DL (ref 2.7–4.5)
PLATELET # BLD AUTO: 221 K/UL (ref 150–350)
PLATELET # BLD AUTO: 230 K/UL (ref 150–350)
PLATELET BLD QL SMEAR: ABNORMAL
PMV BLD AUTO: 10.1 FL (ref 9.2–12.9)
PMV BLD AUTO: 10.2 FL (ref 9.2–12.9)
POCT GLUCOSE: 176 MG/DL (ref 70–110)
POCT GLUCOSE: 227 MG/DL (ref 70–110)
POCT GLUCOSE: 231 MG/DL (ref 70–110)
POCT GLUCOSE: 254 MG/DL (ref 70–110)
POIKILOCYTOSIS BLD QL SMEAR: SLIGHT
POTASSIUM SERPL-SCNC: 3.9 MMOL/L (ref 3.5–5.1)
POTASSIUM SERPL-SCNC: 4.6 MMOL/L (ref 3.5–5.1)
POTASSIUM SERPL-SCNC: 4.6 MMOL/L (ref 3.5–5.1)
RBC # BLD AUTO: 2.53 M/UL (ref 4–5.4)
RBC # BLD AUTO: 2.58 M/UL (ref 4–5.4)
SODIUM SERPL-SCNC: 134 MMOL/L (ref 136–145)
TROPONIN I SERPL DL<=0.01 NG/ML-MCNC: 0.22 NG/ML (ref 0–0.03)
VANCOMYCIN SERPL-MCNC: 14.3 UG/ML
WBC # BLD AUTO: 10.25 K/UL (ref 3.9–12.7)
WBC # BLD AUTO: 9.26 K/UL (ref 3.9–12.7)

## 2019-08-29 PROCEDURE — 80048 BASIC METABOLIC PNL TOTAL CA: CPT | Mod: 91

## 2019-08-29 PROCEDURE — 80202 ASSAY OF VANCOMYCIN: CPT

## 2019-08-29 PROCEDURE — 63600175 PHARM REV CODE 636 W HCPCS: Performed by: STUDENT IN AN ORGANIZED HEALTH CARE EDUCATION/TRAINING PROGRAM

## 2019-08-29 PROCEDURE — 25000003 PHARM REV CODE 250: Performed by: STUDENT IN AN ORGANIZED HEALTH CARE EDUCATION/TRAINING PROGRAM

## 2019-08-29 PROCEDURE — 85025 COMPLETE CBC W/AUTO DIFF WBC: CPT

## 2019-08-29 PROCEDURE — 80048 BASIC METABOLIC PNL TOTAL CA: CPT

## 2019-08-29 PROCEDURE — 93010 ELECTROCARDIOGRAM REPORT: CPT | Mod: ,,, | Performed by: INTERNAL MEDICINE

## 2019-08-29 PROCEDURE — 93005 ELECTROCARDIOGRAM TRACING: CPT

## 2019-08-29 PROCEDURE — 63600175 PHARM REV CODE 636 W HCPCS: Performed by: SURGERY

## 2019-08-29 PROCEDURE — 83735 ASSAY OF MAGNESIUM: CPT | Mod: 91

## 2019-08-29 PROCEDURE — 99232 SBSQ HOSP IP/OBS MODERATE 35: CPT | Mod: ,,, | Performed by: SURGERY

## 2019-08-29 PROCEDURE — 87040 BLOOD CULTURE FOR BACTERIA: CPT

## 2019-08-29 PROCEDURE — 99232 PR SUBSEQUENT HOSPITAL CARE,LEVL II: ICD-10-PCS | Mod: ,,, | Performed by: SURGERY

## 2019-08-29 PROCEDURE — 20600001 HC STEP DOWN PRIVATE ROOM

## 2019-08-29 PROCEDURE — 93010 EKG 12-LEAD: ICD-10-PCS | Mod: ,,, | Performed by: INTERNAL MEDICINE

## 2019-08-29 PROCEDURE — 84100 ASSAY OF PHOSPHORUS: CPT

## 2019-08-29 PROCEDURE — 84484 ASSAY OF TROPONIN QUANT: CPT

## 2019-08-29 RX ORDER — HEPARIN SODIUM 5000 [USP'U]/ML
5000 INJECTION, SOLUTION INTRAVENOUS; SUBCUTANEOUS EVERY 8 HOURS
Status: DISCONTINUED | OUTPATIENT
Start: 2019-08-29 | End: 2019-09-09 | Stop reason: HOSPADM

## 2019-08-29 RX ADMIN — PIPERACILLIN AND TAZOBACTAM 4.5 G: 4; .5 INJECTION, POWDER, LYOPHILIZED, FOR SOLUTION INTRAVENOUS; PARENTERAL at 02:08

## 2019-08-29 RX ADMIN — INSULIN ASPART 6 UNITS: 100 INJECTION, SOLUTION INTRAVENOUS; SUBCUTANEOUS at 05:08

## 2019-08-29 RX ADMIN — VANCOMYCIN HYDROCHLORIDE 1500 MG: 1.5 INJECTION, POWDER, LYOPHILIZED, FOR SOLUTION INTRAVENOUS at 01:08

## 2019-08-29 RX ADMIN — HEPARIN SODIUM 5000 UNITS: 5000 INJECTION, SOLUTION INTRAVENOUS; SUBCUTANEOUS at 09:08

## 2019-08-29 RX ADMIN — SODIUM CHLORIDE: 0.9 INJECTION, SOLUTION INTRAVENOUS at 06:08

## 2019-08-29 RX ADMIN — PIPERACILLIN AND TAZOBACTAM 4.5 G: 4; .5 INJECTION, POWDER, LYOPHILIZED, FOR SOLUTION INTRAVENOUS; PARENTERAL at 05:08

## 2019-08-29 RX ADMIN — ASPIRIN 325 MG: 325 TABLET, DELAYED RELEASE ORAL at 09:08

## 2019-08-29 RX ADMIN — INSULIN ASPART 2 UNITS: 100 INJECTION, SOLUTION INTRAVENOUS; SUBCUTANEOUS at 06:08

## 2019-08-29 RX ADMIN — ACETAMINOPHEN 650 MG: 325 TABLET ORAL at 10:08

## 2019-08-29 RX ADMIN — ATORVASTATIN CALCIUM 80 MG: 20 TABLET, FILM COATED ORAL at 09:08

## 2019-08-29 RX ADMIN — FAMOTIDINE 20 MG: 20 TABLET, FILM COATED ORAL at 09:08

## 2019-08-29 RX ADMIN — PIPERACILLIN AND TAZOBACTAM 4.5 G: 4; .5 INJECTION, POWDER, LYOPHILIZED, FOR SOLUTION INTRAVENOUS; PARENTERAL at 09:08

## 2019-08-29 RX ADMIN — HEPARIN SODIUM 5000 UNITS: 5000 INJECTION, SOLUTION INTRAVENOUS; SUBCUTANEOUS at 01:08

## 2019-08-29 RX ADMIN — INSULIN ASPART 2 UNITS: 100 INJECTION, SOLUTION INTRAVENOUS; SUBCUTANEOUS at 11:08

## 2019-08-29 NOTE — PLAN OF CARE
Problem: Adult Inpatient Plan of Care  Goal: Plan of Care Review  Outcome: Ongoing (interventions implemented as appropriate)  POC reviewed with Pt and family. Pt is AAOX4. Pt follows commands. Pts O2 sats >95% on 3L NC. Pts HR has been 80s. SBP has been 120s-130s. NS @75cc/hr. Guevara intact with U/O of 60-75cc/hr. Pt was afebrile. Pt denied pain. Pt tolerated diet well. BG Q6. VSS. Will continue to monitor.

## 2019-08-29 NOTE — PROGRESS NOTES
Pt to be stepped down to CTSU per MD orders. Report received from BEAU Reyes of SICU. Awaiting pt arrival.     1525  Pt arrived on unit via bed. Pt on RA with NS and Vanc infusing.  Telemetry placed.  Chart and meds sent with pt.  Glasses on and cell phone at bedside.  Pt and  oriented to room and unit. Bed in lowest position with siderails up x2. Call bell within reach; pt instructed to call for assistance.  Bed alarm set.  Pt and family updated with POC.  Will continue to monitor.

## 2019-08-29 NOTE — ASSESSMENT & PLAN NOTE
75yo F post op from CABG 8/12 discharged 8/21 presenting with severe sepsis , DOREEN, and infected appearing LLE wound. Blood cultures drawn in ED + for GNR    Neuro:   Analgesia: Tylenol prn    Cardiac  S/p CABG 8/12  , Atorvastatin 80  TTE     Pulm: oxygenating adequately with NC  Continue to monitor  CT chest with stable post op changes    Renal: DOREEN on CKD 3  s/p volume resuscitation, continue to monitor  Strict I/O  qAM BMP    FEN/GI  Replete lytes K>4, Mg>2  Cardiac diet as tolerated    Heme/ID: Leukocytosis resolved. GNR+ Blood cultures 8/28  Continue Zosyn, d/c Vanc  qAM CBC    Endo: SSI    PPX: Pepcid, SQH  Dispo: stepdown

## 2019-08-29 NOTE — PROGRESS NOTES
Pharmacokinetic Assessment Follow Up: IV Vancomycin    Vancomycin Regimen Assessment/Plan:    - Random vancomycin level from morning labs resulted at 14.3 mg/dl.  This level was taken approximately 23.5 hours after a 2000 mg loading dose.   - Patient admitted with DOREEN on CKD.  SCr improving, making adequate urine.   - Start vancomycin 1500 mg every 24 hours.   - A vancomycin level is ordered prior to 3rd dose on 8/31 at 13:30. Goal level 15-20 mg/dl for bacteremia.     Pharmacy will continue to follow and monitor vancomycin. Please call with questions.     Thank you for the consult,   Sylvia Mata, PharmD, [facility]  [id number]     Patient brief summary:  Kaylee Romero is a 74 y.o. female initiated on antimicrobial therapy with IV Vancomycin for treatment of bacteremia.     Drug Allergies:   Review of patient's allergies indicates:   Allergen Reactions    Bactrim [sulfamethoxazole-trimethoprim] Other (See Comments)     Generic version  Sulfa makes her sick    Keflex [cephalexin] Other (See Comments)     Turns orange       Actual Body Weight:   99.8 kg    Renal Function:   Estimated Creatinine Clearance: 37.2 mL/min (A) (based on SCr of 1.6 mg/dL (H)).,     CBC (last 72 hours):  Recent Labs   Lab Result Units 08/28/19  0036 08/28/19  0731 08/29/19  0324 08/29/19  1108   WBC K/uL 15.24* 10.62 10.25 9.26   Hemoglobin g/dL 8.5* 8.3* 7.1* 7.2*   Hematocrit % 26.9* 25.6* 22.9* 22.6*   Platelets K/uL 328 329 230 221   Gran% % 88.7* 93.5* 85.2* 84.6*   Lymph% % 3.3* 4.8* 6.6* 6.5*   Mono% % 7.1 1.0* 6.7 6.3   Eosinophil% % 0.0 0.0 0.9 1.3   Basophil% % 0.1 0.2 0.1 0.2   Differential Method  Automated Automated Automated Automated       Metabolic Panel (last 72 hours):  Recent Labs   Lab Result Units 08/28/19  0036 08/28/19  0731 08/28/19  1342 08/28/19  1817 08/29/19  0324 08/29/19  1108   Sodium mmol/L 130* 133*  --  134* 134*  134* 134*   Potassium mmol/L 4.4 4.5  --  4.3 4.6  4.6 3.9   Chloride mmol/L 95 102  --   104 105  105 106   CO2 mmol/L 23 19*  --  21* 18*  18* 19*   Glucose mg/dL 291* 297*  --  225* 161*  161* 219*   Glucose, UA   --   --  1+*  --   --   --    BUN, Bld mg/dL 28* 28*  --  27* 23  23 18   Creatinine mg/dL 1.9* 2.0*  --  1.9* 1.7*  1.7* 1.6*   Albumin g/dL 2.8* 2.5*  --   --   --   --    Total Bilirubin mg/dL 1.1* 1.0  --   --   --   --    Alkaline Phosphatase U/L 92 97  --   --   --   --    AST U/L 22 34  --   --   --   --    ALT U/L 21 27  --   --   --   --    Magnesium mg/dL  --  1.5*  --   --  2.1 1.9   Phosphorus mg/dL  --  4.0  --   --  2.6*  --        Vancomycin Administrations:  vancomycin given in the last 96 hours                   vancomycin 2 g in 0.9% sodium chloride 500 mL IVPB (mg) 2,000 mg New Bag 08/28/19 0146                Microbiologic Results:  Microbiology Results (last 7 days)     Procedure Component Value Units Date/Time    Blood culture x two cultures. Draw prior to antibiotics. [[id number]]  (Abnormal) Collected:  08/28/19 0036    Order Status:  Completed Specimen:  Blood from Peripheral, Hand, Left Updated:  08/29/19 1212     Blood Culture, Routine Gram stain susu bottle: Gram negative rods       Results called to and read back by: Marion Vincent RN 08/28/2019  11:17      Gram stain aer bottle: Gram positive cocci in clusters resembling Staph       Results called to and read back by: Amy Dunn RN  08/29/2019        12:12      GRAM NEGATIVE KANDY  Identification and susceptibility pending      Narrative:       Aerobic and anaerobic    Blood culture [[id number]] Collected:  08/29/19 1108    Order Status:  Sent Specimen:  Blood from Peripheral, Wrist, Right Updated:  08/29/19 1109    Blood culture x two cultures. Draw prior to antibiotics. [[id number]] Collected:  08/28/19 0036    Order Status:  Completed Specimen:  Blood from Peripheral, Hand, Right Updated:  08/29/19 0612     Blood Culture, Routine No Growth to date      No Growth to date    Narrative:       Aerobic and  anaerobic    Urine culture [[id number]] Collected:  08/28/19 1342    Order Status:  No result Specimen:  Urine Updated:  08/28/19 1449

## 2019-08-29 NOTE — PROGRESS NOTES
"Dr. Norris called to bedside to assess pt. C/o sudden onset chest pain "feels like pressure, like I was hit by a truck, 8 out of 10 on the pain scale, also c/o headache and "feeling woozy", pt slightly more lethargic than on 0700 assessment. Pupils 3mm brisk/reactive. BUE/BLE strong and equal. EKG obtained. Labs sent. PRN tylenol given for headache.   "

## 2019-08-29 NOTE — PROGRESS NOTES
[facility]  Critical Care - Surgery  Progress Note    Patient Name: Kaylee Romero  MRN: [id number]  Admission Date: 8/27/2019  Hospital Length of Stay: 1 days  Code Status: Full Code  Attending Provider: Peterson Ventura MD  Primary Care Provider: Liz Roberts MD   Principal Problem: <principal problem not specified>    Subjective:     Hospital/ICU Course:  No notes on file    Interval History/Significant Events: No acute events overnight.  Vital signs stable, not on pressors. Satting 97% on room air. Remains afebrile, no leukocytosis. Evaluated by Dr. Ventura yesterday, and determined that she did not need to be taken back to the OR for I&D. Likely just edema to indurated left thigh vein harvest site. Good UOP overnight.        Objective:     Vital Signs (Most Recent):  Temp: 97.8 °F (36.6 °C) (08/29/19 0301)  Pulse: 92 (08/29/19 0900)  Resp: 18 (08/29/19 0900)  BP: (!) 131/57 (08/29/19 0900)  SpO2: 97 % (08/29/19 0900) Vital Signs (24h Range):  Temp:  [97.8 °F (36.6 °C)-99.1 °F (37.3 °C)] 97.8 °F (36.6 °C)  Pulse:  [] 92  Resp:  [13-46] 18  SpO2:  [96 %-100 %] 97 %  BP: ()/(50-77) 131/57     Weight: 99.8 kg (220 lb)  Body mass index is 36.61 kg/m².      Intake/Output Summary (Last 24 hours) at 8/29/2019 1010  Last data filed at 8/29/2019 0900  Gross per 24 hour   Intake 3307 ml   Output 1810 ml   Net 1497 ml       Physical Exam   Constitutional: She appears well-developed and well-nourished. No distress.   HENT:   Head: Normocephalic.   No posterior oropharyngeal exudate. Bruising over maxilla bilaterally and over her eyes with hematoma formation over bridge of nose.   Eyes:   Anicteric Sclera, normal conjunctiva. No discharge   Neck:   No JVD. + hepatojugular reflux. No cervical lymphadenopathy    Cardiovascular:   RRR, Intact distal pulses   Pulmonary/Chest:   CTAB, no increased work of breathing   Abdominal: Soft. She exhibits no distension.   Neurological:   Alert,  Oriented x3   Skin: Skin is warm. She is not diaphoretic.   1+ pitting edema to mid leg.  Left leg with erythema, tenderness, swelling proximal to knee   Nursing note and vitals reviewed.        Lines/Drains/Airways     Drain                 Urethral Catheter 08/28/19 1400 less than 1 day          Peripheral Intravenous Line                 Peripheral IV - Single Lumen 08/28/19 0421 20 G Right Upper Arm 1 day         Peripheral IV - Single Lumen 08/28/19 0501 20 G Left Antecubital 1 day                Significant Labs:    CBC/Anemia Profile:  Recent Labs   Lab 08/28/19  0036 08/28/19  0731 08/29/19  0324   WBC 15.24* 10.62 10.25   HGB 8.5* 8.3* 7.1*   HCT 26.9* 25.6* 22.9*    329 230   MCV 88 85 91   RDW 14.5 14.5 14.7*        Chemistries:  Recent Labs   Lab 08/28/19  0036 08/28/19  0731 08/28/19  1817 08/29/19  0324   * 133* 134* 134*  134*   K 4.4 4.5 4.3 4.6  4.6   CL 95 102 104 105  105   CO2 23 19* 21* 18*  18*   BUN 28* 28* 27* 23  23   CREATININE 1.9* 2.0* 1.9* 1.7*  1.7*   CALCIUM 9.8 8.6* 8.0* 7.9*  7.9*   ALBUMIN 2.8* 2.5*  --   --    PROT 8.2 7.6  --   --    BILITOT 1.1* 1.0  --   --    ALKPHOS 92 97  --   --    ALT 21 27  --   --    AST 22 34  --   --    MG  --  1.5*  --  2.1   PHOS  --  4.0  --  2.6*           Significant Imaging:  I have reviewed all pertinent imaging results/findings within the past 24 hours.    Assessment/Plan:     Sepsis  75yo F post op from CABG 8/12 discharged 8/21 presenting with severe sepsis , DOREEN, and infected appearing LLE wound. Blood cultures drawn in ED + for GNR    Neuro:   Analgesia: Tylenol prn    Cardiac  S/p CABG 8/12  , Atorvastatin 80  TTE     Pulm: oxygenating adequately with NC  Continue to monitor  CT chest with stable post op changes    Renal: DOREEN on CKD 3  s/p volume resuscitation, continue to monitor  Strict I/O  qAM BMP    FEN/GI  Replete lytes K>4, Mg>2  Cardiac diet as tolerated    Heme/ID: Leukocytosis resolved. GNR+ Blood  cultures 8/28  Continue Alonzo, d/c Vanc  qAM CBC    Endo: SSI    PPX: Pepcid, SQH  Dispo: stepdown    Critical care was time spent personally by me on the following activities: development of treatment plan with patient or surrogate and bedside caregivers, discussions with consultants, evaluation of patient's response to treatment, examination of patient, ordering and performing treatments and interventions, ordering and review of laboratory studies, ordering and review of radiographic studies, pulse oximetry, re-evaluation of patient's condition.  This critical care time did not overlap with that of any other provider or involve time for any procedures.     Macy Chavez MD  Critical Care - Surgery  [facility]

## 2019-08-29 NOTE — SUBJECTIVE & OBJECTIVE
Interval History/Significant Events: No acute events overnight.  Vital signs stable, not on pressors. Satting 97% on room air. Remains afebrile, no leukocytosis. Evaluated by Dr. Ventura yesterday, and determined that she did not need to be taken back to the OR for I&D. Likely just edema to indurated left thigh vein harvest site. Good UOP overnight.        Objective:     Vital Signs (Most Recent):  Temp: 97.8 °F (36.6 °C) (08/29/19 0301)  Pulse: 92 (08/29/19 0900)  Resp: 18 (08/29/19 0900)  BP: (!) 131/57 (08/29/19 0900)  SpO2: 97 % (08/29/19 0900) Vital Signs (24h Range):  Temp:  [97.8 °F (36.6 °C)-99.1 °F (37.3 °C)] 97.8 °F (36.6 °C)  Pulse:  [] 92  Resp:  [13-46] 18  SpO2:  [96 %-100 %] 97 %  BP: ()/(50-77) 131/57     Weight: 99.8 kg (220 lb)  Body mass index is 36.61 kg/m².      Intake/Output Summary (Last 24 hours) at 8/29/2019 1010  Last data filed at 8/29/2019 0900  Gross per 24 hour   Intake 3307 ml   Output 1810 ml   Net 1497 ml       Physical Exam   Constitutional: She appears well-developed and well-nourished. No distress.   HENT:   Head: Normocephalic.   No posterior oropharyngeal exudate. Bruising over maxilla bilaterally and over her eyes with hematoma formation over bridge of nose.   Eyes:   Anicteric Sclera, normal conjunctiva. No discharge   Neck:   No JVD. + hepatojugular reflux. No cervical lymphadenopathy    Cardiovascular:   RRR, Intact distal pulses   Pulmonary/Chest:   CTAB, no increased work of breathing   Abdominal: Soft. She exhibits no distension.   Neurological:   Alert, Oriented x3   Skin: Skin is warm. She is not diaphoretic.   1+ pitting edema to mid leg.  Left leg with erythema, tenderness, swelling proximal to knee   Nursing note and vitals reviewed.        Lines/Drains/Airways     Drain                 Urethral Catheter 08/28/19 1400 less than 1 day          Peripheral Intravenous Line                 Peripheral IV - Single Lumen 08/28/19 0421 20 G Right Upper Arm 1 day          Peripheral IV - Single Lumen 08/28/19 0501 20 G Left Antecubital 1 day                Significant Labs:    CBC/Anemia Profile:  Recent Labs   Lab 08/28/19  0036 08/28/19  0731 08/29/19  0324   WBC 15.24* 10.62 10.25   HGB 8.5* 8.3* 7.1*   HCT 26.9* 25.6* 22.9*    329 230   MCV 88 85 91   RDW 14.5 14.5 14.7*        Chemistries:  Recent Labs   Lab 08/28/19  0036 08/28/19  0731 08/28/19  1817 08/29/19  0324   * 133* 134* 134*  134*   K 4.4 4.5 4.3 4.6  4.6   CL 95 102 104 105  105   CO2 23 19* 21* 18*  18*   BUN 28* 28* 27* 23  23   CREATININE 1.9* 2.0* 1.9* 1.7*  1.7*   CALCIUM 9.8 8.6* 8.0* 7.9*  7.9*   ALBUMIN 2.8* 2.5*  --   --    PROT 8.2 7.6  --   --    BILITOT 1.1* 1.0  --   --    ALKPHOS 92 97  --   --    ALT 21 27  --   --    AST 22 34  --   --    MG  --  1.5*  --  2.1   PHOS  --  4.0  --  2.6*           Significant Imaging:  I have reviewed all pertinent imaging results/findings within the past 24 hours.

## 2019-08-29 NOTE — NURSING TRANSFER
Nursing Transfer Note      8/29/2019     Transfer To: [id number]    Transfer via bed    Transfer with cardiac monitoring    Transported by RN    Medicines sent: na    Chart send with patient: Yes    Notified: spouse    Patient reassessed at: 1500    Upon arrival to floor: cardiac monitor applied, patient oriented to room, call bell in reach and bed in lowest position

## 2019-08-30 LAB
ANION GAP SERPL CALC-SCNC: 7 MMOL/L (ref 8–16)
BACTERIA BLD CULT: ABNORMAL
BASOPHILS # BLD AUTO: 0.02 K/UL (ref 0–0.2)
BASOPHILS NFR BLD: 0.3 % (ref 0–1.9)
BUN SERPL-MCNC: 13 MG/DL (ref 8–23)
C DIFF GDH STL QL: POSITIVE
C DIFF TOX A+B STL QL IA: NEGATIVE
C DIFF TOX GENS STL QL NAA+PROBE: NEGATIVE
CALCIUM SERPL-MCNC: 7.5 MG/DL (ref 8.7–10.5)
CHLORIDE SERPL-SCNC: 107 MMOL/L (ref 95–110)
CO2 SERPL-SCNC: 19 MMOL/L (ref 23–29)
CREAT SERPL-MCNC: 1.2 MG/DL (ref 0.5–1.4)
DIFFERENTIAL METHOD: ABNORMAL
EOSINOPHIL # BLD AUTO: 0.1 K/UL (ref 0–0.5)
EOSINOPHIL NFR BLD: 1.9 % (ref 0–8)
ERYTHROCYTE [DISTWIDTH] IN BLOOD BY AUTOMATED COUNT: 14.7 % (ref 11.5–14.5)
EST. GFR  (AFRICAN AMERICAN): 52.2 ML/MIN/1.73 M^2
EST. GFR  (NON AFRICAN AMERICAN): 45.3 ML/MIN/1.73 M^2
GLUCOSE SERPL-MCNC: 181 MG/DL (ref 70–110)
HCT VFR BLD AUTO: 21.7 % (ref 37–48.5)
HGB BLD-MCNC: 7 G/DL (ref 12–16)
IMM GRANULOCYTES # BLD AUTO: 0.03 K/UL (ref 0–0.04)
IMM GRANULOCYTES NFR BLD AUTO: 0.4 % (ref 0–0.5)
LYMPHOCYTES # BLD AUTO: 0.8 K/UL (ref 1–4.8)
LYMPHOCYTES NFR BLD: 10.7 % (ref 18–48)
MAGNESIUM SERPL-MCNC: 1.8 MG/DL (ref 1.6–2.6)
MCH RBC QN AUTO: 27.5 PG (ref 27–31)
MCHC RBC AUTO-ENTMCNC: 32.3 G/DL (ref 32–36)
MCV RBC AUTO: 85 FL (ref 82–98)
MONOCYTES # BLD AUTO: 0.6 K/UL (ref 0.3–1)
MONOCYTES NFR BLD: 7.8 % (ref 4–15)
NEUTROPHILS # BLD AUTO: 6 K/UL (ref 1.8–7.7)
NEUTROPHILS NFR BLD: 78.9 % (ref 38–73)
NRBC BLD-RTO: 0 /100 WBC
PHOSPHATE SERPL-MCNC: 1.4 MG/DL (ref 2.7–4.5)
PLATELET # BLD AUTO: 264 K/UL (ref 150–350)
PMV BLD AUTO: 10.3 FL (ref 9.2–12.9)
POCT GLUCOSE: 176 MG/DL (ref 70–110)
POCT GLUCOSE: 194 MG/DL (ref 70–110)
POCT GLUCOSE: 222 MG/DL (ref 70–110)
POCT GLUCOSE: 244 MG/DL (ref 70–110)
POCT GLUCOSE: 259 MG/DL (ref 70–110)
POTASSIUM SERPL-SCNC: 3.8 MMOL/L (ref 3.5–5.1)
RBC # BLD AUTO: 2.55 M/UL (ref 4–5.4)
SODIUM SERPL-SCNC: 133 MMOL/L (ref 136–145)
WBC # BLD AUTO: 7.55 K/UL (ref 3.9–12.7)

## 2019-08-30 PROCEDURE — 25000003 PHARM REV CODE 250: Performed by: PHYSICIAN ASSISTANT

## 2019-08-30 PROCEDURE — 85025 COMPLETE CBC W/AUTO DIFF WBC: CPT

## 2019-08-30 PROCEDURE — 63600175 PHARM REV CODE 636 W HCPCS: Performed by: STUDENT IN AN ORGANIZED HEALTH CARE EDUCATION/TRAINING PROGRAM

## 2019-08-30 PROCEDURE — 84100 ASSAY OF PHOSPHORUS: CPT

## 2019-08-30 PROCEDURE — 20600001 HC STEP DOWN PRIVATE ROOM

## 2019-08-30 PROCEDURE — 36415 COLL VENOUS BLD VENIPUNCTURE: CPT

## 2019-08-30 PROCEDURE — 83735 ASSAY OF MAGNESIUM: CPT

## 2019-08-30 PROCEDURE — 63600175 PHARM REV CODE 636 W HCPCS: Performed by: PHYSICIAN ASSISTANT

## 2019-08-30 PROCEDURE — 87449 NOS EACH ORGANISM AG IA: CPT

## 2019-08-30 PROCEDURE — 80048 BASIC METABOLIC PNL TOTAL CA: CPT

## 2019-08-30 PROCEDURE — 25000003 PHARM REV CODE 250: Performed by: STUDENT IN AN ORGANIZED HEALTH CARE EDUCATION/TRAINING PROGRAM

## 2019-08-30 PROCEDURE — 87493 C DIFF AMPLIFIED PROBE: CPT

## 2019-08-30 PROCEDURE — 63600175 PHARM REV CODE 636 W HCPCS: Performed by: THORACIC SURGERY (CARDIOTHORACIC VASCULAR SURGERY)

## 2019-08-30 RX ORDER — LANOLIN ALCOHOL/MO/W.PET/CERES
800 CREAM (GRAM) TOPICAL ONCE
Status: COMPLETED | OUTPATIENT
Start: 2019-08-30 | End: 2019-08-30

## 2019-08-30 RX ORDER — LABETALOL HCL 20 MG/4 ML
10 SYRINGE (ML) INTRAVENOUS ONCE
Status: DISCONTINUED | OUTPATIENT
Start: 2019-08-31 | End: 2019-08-30

## 2019-08-30 RX ORDER — LABETALOL HCL 20 MG/4 ML
10 SYRINGE (ML) INTRAVENOUS ONCE
Status: DISCONTINUED | OUTPATIENT
Start: 2019-08-31 | End: 2019-08-31

## 2019-08-30 RX ORDER — METOPROLOL TARTRATE 50 MG/1
50 TABLET ORAL 2 TIMES DAILY
Status: DISCONTINUED | OUTPATIENT
Start: 2019-08-30 | End: 2019-09-05

## 2019-08-30 RX ORDER — POTASSIUM CHLORIDE 750 MG/1
30 CAPSULE, EXTENDED RELEASE ORAL ONCE
Status: COMPLETED | OUTPATIENT
Start: 2019-08-30 | End: 2019-08-30

## 2019-08-30 RX ADMIN — FAMOTIDINE 20 MG: 20 TABLET, FILM COATED ORAL at 09:08

## 2019-08-30 RX ADMIN — INSULIN ASPART 4 UNITS: 100 INJECTION, SOLUTION INTRAVENOUS; SUBCUTANEOUS at 05:08

## 2019-08-30 RX ADMIN — HEPARIN SODIUM 5000 UNITS: 5000 INJECTION, SOLUTION INTRAVENOUS; SUBCUTANEOUS at 03:08

## 2019-08-30 RX ADMIN — POTASSIUM PHOSPHATE, MONOBASIC AND POTASSIUM PHOSPHATE, DIBASIC 15 MMOL: 224; 236 INJECTION, SOLUTION, CONCENTRATE INTRAVENOUS at 10:08

## 2019-08-30 RX ADMIN — INSULIN ASPART 1 UNITS: 100 INJECTION, SOLUTION INTRAVENOUS; SUBCUTANEOUS at 06:08

## 2019-08-30 RX ADMIN — PIPERACILLIN AND TAZOBACTAM 4.5 G: 4; .5 INJECTION, POWDER, LYOPHILIZED, FOR SOLUTION INTRAVENOUS; PARENTERAL at 04:08

## 2019-08-30 RX ADMIN — METOPROLOL TARTRATE 50 MG: 50 TABLET ORAL at 08:08

## 2019-08-30 RX ADMIN — ATORVASTATIN CALCIUM 80 MG: 20 TABLET, FILM COATED ORAL at 09:08

## 2019-08-30 RX ADMIN — SODIUM CHLORIDE: 0.9 INJECTION, SOLUTION INTRAVENOUS at 11:08

## 2019-08-30 RX ADMIN — METOPROLOL TARTRATE 50 MG: 50 TABLET ORAL at 09:08

## 2019-08-30 RX ADMIN — VANCOMYCIN HYDROCHLORIDE 1500 MG: 1.5 INJECTION, POWDER, LYOPHILIZED, FOR SOLUTION INTRAVENOUS at 03:08

## 2019-08-30 RX ADMIN — PIPERACILLIN AND TAZOBACTAM 4.5 G: 4; .5 INJECTION, POWDER, LYOPHILIZED, FOR SOLUTION INTRAVENOUS; PARENTERAL at 09:08

## 2019-08-30 RX ADMIN — POTASSIUM CHLORIDE 30 MEQ: 750 CAPSULE, EXTENDED RELEASE ORAL at 09:08

## 2019-08-30 RX ADMIN — INSULIN ASPART 4 UNITS: 100 INJECTION, SOLUTION INTRAVENOUS; SUBCUTANEOUS at 12:08

## 2019-08-30 RX ADMIN — ASPIRIN 325 MG: 325 TABLET, DELAYED RELEASE ORAL at 09:08

## 2019-08-30 RX ADMIN — HEPARIN SODIUM 5000 UNITS: 5000 INJECTION, SOLUTION INTRAVENOUS; SUBCUTANEOUS at 08:08

## 2019-08-30 RX ADMIN — LOSARTAN POTASSIUM 25 MG: 25 TABLET, FILM COATED ORAL at 10:08

## 2019-08-30 RX ADMIN — PIPERACILLIN AND TAZOBACTAM 4.5 G: 4; .5 INJECTION, POWDER, LYOPHILIZED, FOR SOLUTION INTRAVENOUS; PARENTERAL at 05:08

## 2019-08-30 RX ADMIN — ACETAMINOPHEN 650 MG: 325 TABLET ORAL at 10:08

## 2019-08-30 RX ADMIN — MAGNESIUM OXIDE TAB 400 MG (241.3 MG ELEMENTAL MG) 800 MG: 400 (241.3 MG) TAB at 10:08

## 2019-08-30 RX ADMIN — PIPERACILLIN AND TAZOBACTAM 4.5 G: 4; .5 INJECTION, POWDER, LYOPHILIZED, FOR SOLUTION INTRAVENOUS; PARENTERAL at 02:08

## 2019-08-30 RX ADMIN — INSULIN ASPART 3 UNITS: 100 INJECTION, SOLUTION INTRAVENOUS; SUBCUTANEOUS at 10:08

## 2019-08-30 RX ADMIN — HEPARIN SODIUM 5000 UNITS: 5000 INJECTION, SOLUTION INTRAVENOUS; SUBCUTANEOUS at 05:08

## 2019-08-30 NOTE — PROGRESS NOTES
[facility]  Cardiothoracic Surgery  Progress Note    Patient Name: Kaylee Romero  MRN: [id number]  Admission Date: 8/27/2019  Hospital Length of Stay: 2 days  Code Status: Full Code   Attending Physician: Peterson Ventura MD   Referring Provider: Self, Aaareferral  Principal Problem:<principal problem not specified>            Subjective:     Post-Op Info:  * No surgery found *         Interval History: NAEON. Patient states she feels weak and it is difficult for her to get herself out of bed. Stressed importance of working with PT to get stronger. Still has soreness around sternotomy.     Review of Systems   Constitution: Negative for malaise/fatigue.   Cardiovascular: Positive for dyspnea on exertion. Negative for chest pain, leg swelling and palpitations.   Hematologic/Lymphatic: Negative for bleeding problem.   Gastrointestinal: Negative for abdominal pain.   Genitourinary: Negative for dysuria.   Neurological: Positive for weakness.     Medications:  Continuous Infusions:   sodium chloride 0.9% 75 mL/hr at 08/29/19 0623     Scheduled Meds:   aspirin  325 mg Oral Daily    atorvastatin  80 mg Oral Daily    famotidine  20 mg Oral Daily    heparin (porcine)  5,000 Units Subcutaneous Q8H    losartan  25 mg Oral Daily    metoprolol tartrate  50 mg Oral BID    piperacillin-tazobactam (ZOSYN) IVPB  4.5 g Intravenous Q8H    polyethylene glycol  17 g Oral Daily    potassium phosphate IVPB  15 mmol Intravenous Once    senna-docusate 8.6-50 mg  1 tablet Oral BID    vancomycin (VANCOCIN) IVPB  15 mg/kg Intravenous Q24H     PRN Meds:acetaminophen, bisacodyl, Dextrose 10% Bolus, glucagon (human recombinant), insulin aspart U-100, ondansetron, oxyCODONE, sodium chloride 0.9%     Objective:     Vital Signs (Most Recent):  Temp: 98.3 °F (36.8 °C) (08/30/19 0800)  Pulse: 92 (08/30/19 0809)  Resp: 20 (08/30/19 0800)  BP: (!) 153/77 (08/30/19 0809)  SpO2: 97 % (08/30/19 0800) Vital Signs (24h  Range):  Temp:  [98.1 °F (36.7 °C)-99 °F (37.2 °C)] 98.3 °F (36.8 °C)  Pulse:  [] 92  Resp:  [16-21] 20  SpO2:  [96 %-99 %] 97 %  BP: (118-177)/(56-96) 153/77     Weight: 99.8 kg (220 lb)  Body mass index is 36.61 kg/m².    SpO2: 97 %  O2 Device (Oxygen Therapy): room air    Intake/Output - Last 3 Shifts       08/28 0700 - 08/29 0659 08/29 0700 - 08/30 0659 08/30 0700 - 08/31 0659    P.O. 120 518     I.V. (mL/kg) 1187 (11.9)      IV Piggyback 2000      Total Intake(mL/kg) 3307 (33.1) 518 (5.2)     Urine (mL/kg/hr) 1610 (0.7) 405 (0.2)     Total Output 1610 405     Net +1697 +113            Stool Occurrence  4 x           Lines/Drains/Airways     Peripheral Intravenous Line                 Peripheral IV - Single Lumen 08/28/19 0421 20 G Right Upper Arm 2 days         Peripheral IV - Single Lumen 08/28/19 0501 20 G Left Antecubital 2 days                Physical Exam   Constitutional: She is oriented to person, place, and time. She appears well-developed and well-nourished.   HENT:   Head: Normocephalic and atraumatic.   Eyes: Pupils are equal, round, and reactive to light. EOM are normal.   Neck: Normal range of motion.   Cardiovascular: Regular rhythm and normal pulses.   Tachycardic    Pulmonary/Chest: Effort normal. No respiratory distress.   Abdominal: Soft.   Musculoskeletal: Normal range of motion. She exhibits edema.   Neurological: She is alert and oriented to person, place, and time.   Skin: Skin is warm, dry and intact. Capillary refill takes less than 2 seconds.   Graft site with some swelling. No tenderness or erythema.   Bruising to both eyes from fall PTA   Psychiatric: She has a normal mood and affect. Her speech is normal and behavior is normal. Judgment and thought content normal.   Vitals reviewed.      Significant Labs:  BMP:   Recent Labs   Lab 08/30/19  0459 08/30/19  0816   *  --    *  --    K 3.8  --      --    CO2 19*  --    BUN 13  --    CREATININE 1.2  --    CALCIUM  7.5*  --    MG  --  1.8     CBC:   Recent Labs   Lab 08/30/19  0459   WBC 7.55   RBC 2.55*   HGB 7.0*   HCT 21.7*      MCV 85   MCH 27.5   MCHC 32.3       Significant Diagnostics:  I have reviewed all pertinent imaging results/findings within the past 24 hours.    Assessment/Plan:     Sepsis  73yo F post op from CABG 8/12 discharged 8/21 presenting with severe sepsis , DOREEN, and infected appearing LLE wound. Blood cultures drawn in ED + for GNR       Tylenol and Oxycodone prn  , Atorvastatin 80  TTE   Metop 50 BID added for tachycardia   Lose dose Losartan for elevated BP  oxygenating adequately with NC  Continue to monitor  CT chest with stable post op changes  Replete lytes K>4, Mg>2  Cardiac diet as tolerated   Leukocytosis resolved. GNR+ Blood cultures 8/28  Continue Zosyn, d/c Vanc  Afebrile          Hypophosphatemia  Phos 1.4   Replaced IV   Daily labs     Acute blood loss anemia  H/H 7/21.7  CBC daily   Discuss giving blood with staff if no improvement tomorrow     Physical deconditioning  PT/OT   Planning for SNF placement     CKD (chronic kidney disease) stage 3, GFR 30-59 ml/min  DOREEN on CKD 3  s/p volume resuscitation, continue to monitor  Strict I/O  qAM BMP  Creatinine down to 1.2 today from 1.6         Morbid obesity with body mass index (BMI) of 40.0 or higher  PT/OT   Cardiac diet     Dispo: CTSU. Awaiting SNF placement, likely next week     Catherine Baker PA-C  Cardiothoracic Surgery  [facility]

## 2019-08-30 NOTE — ASSESSMENT & PLAN NOTE
75yo F post op from CABG 8/12 discharged 8/21 presenting with severe sepsis , DOREEN, and infected appearing LLE wound. Blood cultures drawn in ED + for GNR       Tylenol and Oxycodone prn  , Atorvastatin 80  TTE   Metop 50 BID added for tachycardia   Lose dose Losartan for elevated BP  oxygenating adequately with NC  Continue to monitor  CT chest with stable post op changes  Replete lytes K>4, Mg>2  Cardiac diet as tolerated   Leukocytosis resolved. GNR+ Blood cultures 8/28  Continue Zosyn, d/c Vanc  Afebrile

## 2019-08-30 NOTE — PROGRESS NOTES
Patient refusing standing weight and sitting up in the chair. Attempted to educate, patient continues to ignore instructions.

## 2019-08-30 NOTE — PROGRESS NOTES
"Attempted to educated patient again about sternal precautions but she refuses to listen and follow sternal precautions. Proceeds to be verbally disrespectful to nurses who attempt to help and educate her and say "I don't care" and "I know what I need to do." Also educated patient on her diet because she is disrespectfully yelling at nurse to get her orange juice because she did not get enough with her breakfast. Informed her that because she is a diabetic we cannot supply additional orange juice but can give her water or diet cranberry juice because the orange juice can increase her blood sugar. Patient yelled "you can keep my tray if I don't get orange juice I wont eat it" RN ended education session when patient raised her voice and patient proceeded to eat the food she said she would not eat...  "

## 2019-08-30 NOTE — PLAN OF CARE
Problem: Adult Inpatient Plan of Care  Goal: Plan of Care Review  Outcome: Ongoing (interventions implemented as appropriate)  Plan of care reviewed with pt and . Both in agreement with plan of care. Pt remains free of falls or injury. Pt on contact isolation. Assist x2 due to multiple falls at home. VSS-ST on monitor. No c/o pain or SOB. Sternal precautions in place. Midsternal incision approximated, open to air, CDI. IV zosyn Q8 given. BGC Q6. Pt resting comfortably, will continue to monitor.

## 2019-08-30 NOTE — NURSING
Through shift pt. Has consistently required being reminded of sternal precautions. RN's gave education on importance of sternal precautions. Pt states understanding, but continues to not follow.

## 2019-08-30 NOTE — ASSESSMENT & PLAN NOTE
DOREEN on CKD 3  s/p volume resuscitation, continue to monitor  Strict I/O  qAM BMP  Creatinine down to 1.2 today from 1.6

## 2019-08-30 NOTE — SUBJECTIVE & OBJECTIVE
Interval History: NAEON. Patient states she feels weak and it is difficult for her to get herself out of bed. Stressed importance of working with PT to get stronger. Still has soreness around sternotomy.     Review of Systems   Constitution: Negative for malaise/fatigue.   Cardiovascular: Positive for dyspnea on exertion. Negative for chest pain, leg swelling and palpitations.   Hematologic/Lymphatic: Negative for bleeding problem.   Gastrointestinal: Negative for abdominal pain.   Genitourinary: Negative for dysuria.   Neurological: Positive for weakness.     Medications:  Continuous Infusions:   sodium chloride 0.9% 75 mL/hr at 08/29/19 0623     Scheduled Meds:   aspirin  325 mg Oral Daily    atorvastatin  80 mg Oral Daily    famotidine  20 mg Oral Daily    heparin (porcine)  5,000 Units Subcutaneous Q8H    losartan  25 mg Oral Daily    metoprolol tartrate  50 mg Oral BID    piperacillin-tazobactam (ZOSYN) IVPB  4.5 g Intravenous Q8H    polyethylene glycol  17 g Oral Daily    potassium phosphate IVPB  15 mmol Intravenous Once    senna-docusate 8.6-50 mg  1 tablet Oral BID    vancomycin (VANCOCIN) IVPB  15 mg/kg Intravenous Q24H     PRN Meds:acetaminophen, bisacodyl, Dextrose 10% Bolus, glucagon (human recombinant), insulin aspart U-100, ondansetron, oxyCODONE, sodium chloride 0.9%     Objective:     Vital Signs (Most Recent):  Temp: 98.3 °F (36.8 °C) (08/30/19 0800)  Pulse: 92 (08/30/19 0809)  Resp: 20 (08/30/19 0800)  BP: (!) 153/77 (08/30/19 0809)  SpO2: 97 % (08/30/19 0800) Vital Signs (24h Range):  Temp:  [98.1 °F (36.7 °C)-99 °F (37.2 °C)] 98.3 °F (36.8 °C)  Pulse:  [] 92  Resp:  [16-21] 20  SpO2:  [96 %-99 %] 97 %  BP: (118-177)/(56-96) 153/77     Weight: 99.8 kg (220 lb)  Body mass index is 36.61 kg/m².    SpO2: 97 %  O2 Device (Oxygen Therapy): room air    Intake/Output - Last 3 Shifts       08/28 0700 - 08/29 0659 08/29 0700 - 08/30 0659 08/30 0700 - 08/31 0659    P.O. 120 878     I.V.  (mL/kg) 1187 (11.9)      IV Piggyback 2000      Total Intake(mL/kg) 3307 (33.1) 518 (5.2)     Urine (mL/kg/hr) 1610 (0.7) 405 (0.2)     Total Output 1610 405     Net +1697 +113            Stool Occurrence  4 x           Lines/Drains/Airways     Peripheral Intravenous Line                 Peripheral IV - Single Lumen 08/28/19 0421 20 G Right Upper Arm 2 days         Peripheral IV - Single Lumen 08/28/19 0501 20 G Left Antecubital 2 days                Physical Exam   Constitutional: She is oriented to person, place, and time. She appears well-developed and well-nourished.   HENT:   Head: Normocephalic and atraumatic.   Eyes: Pupils are equal, round, and reactive to light. EOM are normal.   Neck: Normal range of motion.   Cardiovascular: Regular rhythm and normal pulses.   Tachycardic    Pulmonary/Chest: Effort normal. No respiratory distress.   Abdominal: Soft.   Musculoskeletal: Normal range of motion. She exhibits edema.   Neurological: She is alert and oriented to person, place, and time.   Skin: Skin is warm, dry and intact. Capillary refill takes less than 2 seconds.   Graft site with some swelling. No tenderness or erythema.   Bruising to both eyes from fall PTA   Psychiatric: She has a normal mood and affect. Her speech is normal and behavior is normal. Judgment and thought content normal.   Vitals reviewed.      Significant Labs:  BMP:   Recent Labs   Lab 08/30/19  0459 08/30/19  0816   *  --    *  --    K 3.8  --      --    CO2 19*  --    BUN 13  --    CREATININE 1.2  --    CALCIUM 7.5*  --    MG  --  1.8     CBC:   Recent Labs   Lab 08/30/19  0459   WBC 7.55   RBC 2.55*   HGB 7.0*   HCT 21.7*      MCV 85   MCH 27.5   MCHC 32.3       Significant Diagnostics:  I have reviewed all pertinent imaging results/findings within the past 24 hours.

## 2019-08-30 NOTE — PLAN OF CARE
Problem: Adult Inpatient Plan of Care  Goal: Plan of Care Review  Outcome: Revised  Pt free of falls/trauma/injuries.  Denies c/o SOB, CP, or discomfort.  H/A from earlier today resolved.  Generalized skin remains CDI; no edema noted to BLEs.  Incisions remain SRINIVASAN, CDI with dermabond.  Sternal precautions reinforced and heart pillow at bedside.  PT/OT following; pt able to ambulate to bathroom with 1-person.  Strict fall precautions in place; pt and family educated on risks and preventative measures. Pt being treated with IVPB Vanc daily and Zosyn Q8.  Contact precautions maintained.  Plan to continue with infection management.   at the bedside.  Pt and family tolerating plan of care.

## 2019-08-31 LAB
ANION GAP SERPL CALC-SCNC: 9 MMOL/L (ref 8–16)
BASOPHILS # BLD AUTO: 0.03 K/UL (ref 0–0.2)
BASOPHILS NFR BLD: 0.4 % (ref 0–1.9)
BUN SERPL-MCNC: 9 MG/DL (ref 8–23)
CALCIUM SERPL-MCNC: 7.7 MG/DL (ref 8.7–10.5)
CHLORIDE SERPL-SCNC: 108 MMOL/L (ref 95–110)
CO2 SERPL-SCNC: 18 MMOL/L (ref 23–29)
CREAT SERPL-MCNC: 1.2 MG/DL (ref 0.5–1.4)
DIFFERENTIAL METHOD: ABNORMAL
EOSINOPHIL # BLD AUTO: 0.2 K/UL (ref 0–0.5)
EOSINOPHIL NFR BLD: 2.5 % (ref 0–8)
ERYTHROCYTE [DISTWIDTH] IN BLOOD BY AUTOMATED COUNT: 14.9 % (ref 11.5–14.5)
EST. GFR  (AFRICAN AMERICAN): 52.2 ML/MIN/1.73 M^2
EST. GFR  (NON AFRICAN AMERICAN): 45.3 ML/MIN/1.73 M^2
GLUCOSE SERPL-MCNC: 226 MG/DL (ref 70–110)
HCT VFR BLD AUTO: 23 % (ref 37–48.5)
HGB BLD-MCNC: 7.1 G/DL (ref 12–16)
IMM GRANULOCYTES # BLD AUTO: 0.07 K/UL (ref 0–0.04)
IMM GRANULOCYTES NFR BLD AUTO: 1 % (ref 0–0.5)
LYMPHOCYTES # BLD AUTO: 0.9 K/UL (ref 1–4.8)
LYMPHOCYTES NFR BLD: 12.9 % (ref 18–48)
MAGNESIUM SERPL-MCNC: 1.7 MG/DL (ref 1.6–2.6)
MCH RBC QN AUTO: 27.3 PG (ref 27–31)
MCHC RBC AUTO-ENTMCNC: 30.9 G/DL (ref 32–36)
MCV RBC AUTO: 89 FL (ref 82–98)
MONOCYTES # BLD AUTO: 0.5 K/UL (ref 0.3–1)
MONOCYTES NFR BLD: 7.2 % (ref 4–15)
NEUTROPHILS # BLD AUTO: 5.3 K/UL (ref 1.8–7.7)
NEUTROPHILS NFR BLD: 76 % (ref 38–73)
NRBC BLD-RTO: 0 /100 WBC
PHOSPHATE SERPL-MCNC: 1.3 MG/DL (ref 2.7–4.5)
PLATELET # BLD AUTO: 236 K/UL (ref 150–350)
PMV BLD AUTO: 10.2 FL (ref 9.2–12.9)
POCT GLUCOSE: 225 MG/DL (ref 70–110)
POCT GLUCOSE: 227 MG/DL (ref 70–110)
POCT GLUCOSE: 252 MG/DL (ref 70–110)
POCT GLUCOSE: 269 MG/DL (ref 70–110)
POTASSIUM SERPL-SCNC: 3.9 MMOL/L (ref 3.5–5.1)
RBC # BLD AUTO: 2.6 M/UL (ref 4–5.4)
SODIUM SERPL-SCNC: 135 MMOL/L (ref 136–145)
VANCOMYCIN TROUGH SERPL-MCNC: 14.3 UG/ML (ref 10–22)
WBC # BLD AUTO: 6.92 K/UL (ref 3.9–12.7)

## 2019-08-31 PROCEDURE — 63600175 PHARM REV CODE 636 W HCPCS: Performed by: STUDENT IN AN ORGANIZED HEALTH CARE EDUCATION/TRAINING PROGRAM

## 2019-08-31 PROCEDURE — 80048 BASIC METABOLIC PNL TOTAL CA: CPT

## 2019-08-31 PROCEDURE — 36415 COLL VENOUS BLD VENIPUNCTURE: CPT

## 2019-08-31 PROCEDURE — 20600001 HC STEP DOWN PRIVATE ROOM

## 2019-08-31 PROCEDURE — 85025 COMPLETE CBC W/AUTO DIFF WBC: CPT

## 2019-08-31 PROCEDURE — 83735 ASSAY OF MAGNESIUM: CPT

## 2019-08-31 PROCEDURE — 84100 ASSAY OF PHOSPHORUS: CPT

## 2019-08-31 PROCEDURE — 25000003 PHARM REV CODE 250: Performed by: STUDENT IN AN ORGANIZED HEALTH CARE EDUCATION/TRAINING PROGRAM

## 2019-08-31 PROCEDURE — 80202 ASSAY OF VANCOMYCIN: CPT

## 2019-08-31 PROCEDURE — 63600175 PHARM REV CODE 636 W HCPCS: Performed by: THORACIC SURGERY (CARDIOTHORACIC VASCULAR SURGERY)

## 2019-08-31 PROCEDURE — 25000003 PHARM REV CODE 250: Performed by: PHYSICIAN ASSISTANT

## 2019-08-31 RX ORDER — METRONIDAZOLE 500 MG/1
500 TABLET ORAL EVERY 8 HOURS
Status: DISCONTINUED | OUTPATIENT
Start: 2019-08-31 | End: 2019-09-02

## 2019-08-31 RX ORDER — HYDRALAZINE HYDROCHLORIDE 20 MG/ML
5 INJECTION INTRAMUSCULAR; INTRAVENOUS ONCE
Status: COMPLETED | OUTPATIENT
Start: 2019-08-31 | End: 2019-08-31

## 2019-08-31 RX ORDER — FUROSEMIDE 10 MG/ML
20 INJECTION INTRAMUSCULAR; INTRAVENOUS 2 TIMES DAILY
Status: DISCONTINUED | OUTPATIENT
Start: 2019-08-31 | End: 2019-09-05

## 2019-08-31 RX ORDER — LANOLIN ALCOHOL/MO/W.PET/CERES
800 CREAM (GRAM) TOPICAL 2 TIMES DAILY
Status: COMPLETED | OUTPATIENT
Start: 2019-08-31 | End: 2019-08-31

## 2019-08-31 RX ORDER — LOSARTAN POTASSIUM 50 MG/1
50 TABLET ORAL DAILY
Status: DISCONTINUED | OUTPATIENT
Start: 2019-08-31 | End: 2019-09-05

## 2019-08-31 RX ORDER — HYDRALAZINE HYDROCHLORIDE 20 MG/ML
10 INJECTION INTRAMUSCULAR; INTRAVENOUS ONCE
Status: COMPLETED | OUTPATIENT
Start: 2019-08-31 | End: 2019-08-31

## 2019-08-31 RX ORDER — LABETALOL HCL 20 MG/4 ML
10 SYRINGE (ML) INTRAVENOUS ONCE
Status: COMPLETED | OUTPATIENT
Start: 2019-08-31 | End: 2019-08-31

## 2019-08-31 RX ORDER — VANCOMYCIN 1.75 GRAM/500 ML IN 0.9 % SODIUM CHLORIDE INTRAVENOUS
1750
Status: DISCONTINUED | OUTPATIENT
Start: 2019-08-31 | End: 2019-09-01

## 2019-08-31 RX ADMIN — HEPARIN SODIUM 5000 UNITS: 5000 INJECTION, SOLUTION INTRAVENOUS; SUBCUTANEOUS at 08:08

## 2019-08-31 RX ADMIN — PIPERACILLIN AND TAZOBACTAM 4.5 G: 4; .5 INJECTION, POWDER, LYOPHILIZED, FOR SOLUTION INTRAVENOUS; PARENTERAL at 10:08

## 2019-08-31 RX ADMIN — FAMOTIDINE 20 MG: 20 TABLET, FILM COATED ORAL at 08:08

## 2019-08-31 RX ADMIN — MAGNESIUM OXIDE TAB 400 MG (241.3 MG ELEMENTAL MG) 800 MG: 400 (241.3 MG) TAB at 08:08

## 2019-08-31 RX ADMIN — PIPERACILLIN AND TAZOBACTAM 4.5 G: 4; .5 INJECTION, POWDER, LYOPHILIZED, FOR SOLUTION INTRAVENOUS; PARENTERAL at 08:08

## 2019-08-31 RX ADMIN — LOSARTAN POTASSIUM 25 MG: 25 TABLET, FILM COATED ORAL at 08:08

## 2019-08-31 RX ADMIN — ATORVASTATIN CALCIUM 80 MG: 20 TABLET, FILM COATED ORAL at 08:08

## 2019-08-31 RX ADMIN — FUROSEMIDE 20 MG: 10 INJECTION, SOLUTION INTRAMUSCULAR; INTRAVENOUS at 06:08

## 2019-08-31 RX ADMIN — HEPARIN SODIUM 5000 UNITS: 5000 INJECTION, SOLUTION INTRAVENOUS; SUBCUTANEOUS at 02:08

## 2019-08-31 RX ADMIN — POTASSIUM PHOSPHATE, MONOBASIC AND POTASSIUM PHOSPHATE, DIBASIC 30 MMOL: 224; 236 INJECTION, SOLUTION, CONCENTRATE INTRAVENOUS at 11:08

## 2019-08-31 RX ADMIN — ASPIRIN 325 MG: 325 TABLET, DELAYED RELEASE ORAL at 08:08

## 2019-08-31 RX ADMIN — PIPERACILLIN AND TAZOBACTAM 4.5 G: 4; .5 INJECTION, POWDER, LYOPHILIZED, FOR SOLUTION INTRAVENOUS; PARENTERAL at 01:08

## 2019-08-31 RX ADMIN — HYDRALAZINE HYDROCHLORIDE 10 MG: 20 INJECTION INTRAMUSCULAR; INTRAVENOUS at 03:08

## 2019-08-31 RX ADMIN — LABETALOL HCL IV SOLN PREFILLED SYRINGE 20 MG/4ML (5 MG/ML) 10 MG: 20/4 SOLUTION PREFILLED SYRINGE at 12:08

## 2019-08-31 RX ADMIN — LOSARTAN POTASSIUM 50 MG: 50 TABLET, FILM COATED ORAL at 10:08

## 2019-08-31 RX ADMIN — HYDRALAZINE HYDROCHLORIDE 5 MG: 20 INJECTION INTRAMUSCULAR; INTRAVENOUS at 01:08

## 2019-08-31 RX ADMIN — INSULIN ASPART 3 UNITS: 100 INJECTION, SOLUTION INTRAVENOUS; SUBCUTANEOUS at 04:08

## 2019-08-31 RX ADMIN — INSULIN ASPART 6 UNITS: 100 INJECTION, SOLUTION INTRAVENOUS; SUBCUTANEOUS at 11:08

## 2019-08-31 RX ADMIN — INSULIN ASPART 4 UNITS: 100 INJECTION, SOLUTION INTRAVENOUS; SUBCUTANEOUS at 05:08

## 2019-08-31 RX ADMIN — MAGNESIUM OXIDE TAB 400 MG (241.3 MG ELEMENTAL MG) 800 MG: 400 (241.3 MG) TAB at 10:08

## 2019-08-31 RX ADMIN — METRONIDAZOLE 500 MG: 500 TABLET ORAL at 02:08

## 2019-08-31 RX ADMIN — METOPROLOL TARTRATE 50 MG: 50 TABLET ORAL at 08:08

## 2019-08-31 RX ADMIN — HEPARIN SODIUM 5000 UNITS: 5000 INJECTION, SOLUTION INTRAVENOUS; SUBCUTANEOUS at 05:08

## 2019-08-31 RX ADMIN — METRONIDAZOLE 500 MG: 500 TABLET ORAL at 08:08

## 2019-08-31 RX ADMIN — VANCOMYCIN HYDROCHLORIDE 1750 MG: 100 INJECTION, POWDER, LYOPHILIZED, FOR SOLUTION INTRAVENOUS at 06:08

## 2019-08-31 RX ADMIN — FUROSEMIDE 20 MG: 10 INJECTION, SOLUTION INTRAMUSCULAR; INTRAVENOUS at 10:08

## 2019-08-31 NOTE — PLAN OF CARE
Problem: Adult Inpatient Plan of Care  Goal: Plan of Care Review  Outcome: Ongoing (interventions implemented as appropriate)  Plan of care reviewed with pt. Pt remains free of falls/injury. BP elevated 209/94. MD notified. ordered Labetalol 10IVP given. /88. MD notified. Ordered Hydralazine 5mg IVP given. /89 MD notified. Ordered Hydralazine 10mg IVP, given. No c/o of pain or SOB. Pts gait unsteady, 2x assist. Educated on sternal precaution compliance, states understanding, but does not follow. NS at 75mL/hr. Zosyn at 25mL/hr. Will continue to monitor.

## 2019-08-31 NOTE — ASSESSMENT & PLAN NOTE
75yo F post op from CABG 8/12 discharged 8/21 presenting with severe sepsis , DOREEN, and infected appearing LLE wound. Blood cultures drawn in ED + for GNR     Tylenol and Oxycodone prn  , Atorvastatin 80  Metop 50 BID   Increase Losartan to 50mg this morning. Will plan to add Imdur if increased Losartan and diuresis do not control BP  On RA  Replete lytes K>4, Mg>2  Cardiac diet as tolerated  Leukocytosis resolved. GNR+ Blood cultures 8/28 and C diff now positive. Will add PO Flagyl  Continue Zosyn for now  Afebrile

## 2019-08-31 NOTE — PROGRESS NOTES
[facility]  Cardiothoracic Surgery  Progress Note    Patient Name: Kaylee Romero  MRN: [id number]  Admission Date: 8/27/2019  Hospital Length of Stay: 3 days  Code Status: Full Code   Attending Physician: Peterson Ventura MD   Referring Provider: Self, Aaareferral  Principal Problem:<principal problem not specified>      Subjective:     Post-Op Info:  * No surgery found *         Interval History:   NAEON. Weakness somewhat improved but still having difficulty getting out of bed. Need continued work with PT to get stronger. SBP high, Mg, Phos low.     Review of Systems   Constitution: Negative for malaise/fatigue.   Cardiovascular: Positive for dyspnea on exertion. Negative for chest pain, leg swelling and palpitations.   Gastrointestinal: Negative for abdominal pain.   Genitourinary: Negative for dysuria.   Neurological: Positive for weakness.     Medications:  Continuous Infusions:    Scheduled Meds:   aspirin  325 mg Oral Daily    atorvastatin  80 mg Oral Daily    famotidine  20 mg Oral Daily    furosemide  20 mg Intravenous BID    heparin (porcine)  5,000 Units Subcutaneous Q8H    losartan  50 mg Oral Daily    magnesium oxide  800 mg Oral BID    metoprolol tartrate  50 mg Oral BID    piperacillin-tazobactam (ZOSYN) IVPB  4.5 g Intravenous Q8H    polyethylene glycol  17 g Oral Daily    potassium phosphate IVPB  30 mmol Intravenous Q6H    senna-docusate 8.6-50 mg  1 tablet Oral BID    vancomycin (VANCOCIN) IVPB  15 mg/kg Intravenous Q24H     PRN Meds:acetaminophen, bisacodyl, Dextrose 10% Bolus, glucagon (human recombinant), insulin aspart U-100, ondansetron, oxyCODONE, sodium chloride 0.9%     Objective:     Vital Signs (Most Recent):  Temp: 98.8 °F (37.1 °C) (08/31/19 0745)  Pulse: 94 (08/31/19 0745)  Resp: 18 (08/31/19 0745)  BP: (!) 182/85 (08/31/19 0745)  SpO2: 95 % (08/31/19 0745) Vital Signs (24h Range):  Temp:  [97.8 °F (36.6 °C)-99 °F (37.2 °C)] 98.8 °F (37.1  °C)  Pulse:  [75-94] 94  Resp:  [16-18] 18  SpO2:  [95 %-98 %] 95 %  BP: (141-209)/() 182/85     Weight: 108.1 kg (238 lb 5.1 oz)  Body mass index is 39.66 kg/m².    SpO2: 95 %  O2 Device (Oxygen Therapy): room air    Intake/Output - Last 3 Shifts       08/29 0700 - 08/30 0659 08/30 0700 - 08/31 0659 08/31 0700 - 09/01 0659    P.O. 518 810     I.V. (mL/kg)       IV Piggyback  300     Total Intake(mL/kg) 518 (5.2) 1110 (10.3)     Urine (mL/kg/hr) 405 (0.2) 400 (0.2)     Stool  0     Total Output 405 400     Net +113 +710            Urine Occurrence  2 x     Stool Occurrence 4 x 1 x           Lines/Drains/Airways     Peripheral Intravenous Line                 Peripheral IV - Single Lumen 08/28/19 0501 20 G Left Antecubital 3 days                Physical Exam   Constitutional: She is oriented to person, place, and time. She appears well-developed and well-nourished.   HENT:   Head: Normocephalic and atraumatic.   Eyes: Pupils are equal, round, and reactive to light. EOM are normal.   Neck: Normal range of motion.   Cardiovascular: Regular rhythm and normal pulses.   SR   Pulmonary/Chest: Effort normal. No respiratory distress.   Abdominal: Soft.   Musculoskeletal: Normal range of motion. She exhibits edema.   Neurological: She is alert and oriented to person, place, and time.   Skin: Skin is warm, dry and intact. Capillary refill takes less than 2 seconds.   Graft site with some swelling. No tenderness or erythema.   Bruising to both eyes from fall PTA is slowly resolving   Psychiatric: She has a normal mood and affect. Her speech is normal and behavior is normal. Judgment and thought content normal.   Vitals reviewed.      Significant Labs:  BMP:   Recent Labs   Lab 08/31/19  0423   *   *   K 3.9      CO2 18*   BUN 9   CREATININE 1.2   CALCIUM 7.7*   MG 1.7     CBC:   Recent Labs   Lab 08/31/19  0423   WBC 6.92   RBC 2.60*   HGB 7.1*   HCT 23.0*      MCV 89   MCH 27.3   MCHC 30.9*        Significant Diagnostics:  I have reviewed all pertinent imaging results/findings within the past 24 hours.    Assessment/Plan:     Sepsis  73yo F post op from CABG 8/12 discharged 8/21 presenting with severe sepsis , DOREEN, and infected appearing LLE wound. Blood cultures drawn in ED + for GNR     Tylenol and Oxycodone prn  , Atorvastatin 80  Metop 50 BID   Increase Losartan to 50mg this morning. Will plan to add Imdur if increased Losartan and diuresis do not control BP  On RA  Replete lytes K>4, Mg>2  Cardiac diet as tolerated  Leukocytosis resolved. GNR+ Blood cultures 8/28 and C diff now positive. Will add PO Flagyl  Continue Zosyn for now  Afebrile         Hypophosphatemia  Phos 1.3 today from 1.4 yesterday. Will aggressively replace again today  Daily labs     Acute blood loss anemia  H/H 7.1 from 7.0  CBC daily   Hold off transfusion for now as likely largely dilutional from admit Hb level    Physical deconditioning  PT/OT   Planning for SNF placement     CKD (chronic kidney disease) stage 3, GFR 30-59 ml/min  DOREEN on CKD 3  s/p volume resuscitation, continue to monitor  Strict I/O  qAM BMP  Creatinine down to 1.2 stable  Restarting Lasix 20IV BID         Morbid obesity with body mass index (BMI) of 40.0 or higher  PT/OT   Cardiac diet         Gayatri Garcia MD  Cardiothoracic Surgery  [facility]

## 2019-08-31 NOTE — SUBJECTIVE & OBJECTIVE
Interval History:   NAEON. Weakness somewhat improved but still having difficulty getting out of bed. Need continued work with PT to get stronger. SBP high, Mg, Phos low.     Review of Systems   Constitution: Negative for malaise/fatigue.   Cardiovascular: Positive for dyspnea on exertion. Negative for chest pain, leg swelling and palpitations.   Gastrointestinal: Negative for abdominal pain.   Genitourinary: Negative for dysuria.   Neurological: Positive for weakness.     Medications:  Continuous Infusions:    Scheduled Meds:   aspirin  325 mg Oral Daily    atorvastatin  80 mg Oral Daily    famotidine  20 mg Oral Daily    furosemide  20 mg Intravenous BID    heparin (porcine)  5,000 Units Subcutaneous Q8H    losartan  50 mg Oral Daily    magnesium oxide  800 mg Oral BID    metoprolol tartrate  50 mg Oral BID    piperacillin-tazobactam (ZOSYN) IVPB  4.5 g Intravenous Q8H    polyethylene glycol  17 g Oral Daily    potassium phosphate IVPB  30 mmol Intravenous Q6H    senna-docusate 8.6-50 mg  1 tablet Oral BID    vancomycin (VANCOCIN) IVPB  15 mg/kg Intravenous Q24H     PRN Meds:acetaminophen, bisacodyl, Dextrose 10% Bolus, glucagon (human recombinant), insulin aspart U-100, ondansetron, oxyCODONE, sodium chloride 0.9%     Objective:     Vital Signs (Most Recent):  Temp: 98.8 °F (37.1 °C) (08/31/19 0745)  Pulse: 94 (08/31/19 0745)  Resp: 18 (08/31/19 0745)  BP: (!) 182/85 (08/31/19 0745)  SpO2: 95 % (08/31/19 0745) Vital Signs (24h Range):  Temp:  [97.8 °F (36.6 °C)-99 °F (37.2 °C)] 98.8 °F (37.1 °C)  Pulse:  [75-94] 94  Resp:  [16-18] 18  SpO2:  [95 %-98 %] 95 %  BP: (141-209)/() 182/85     Weight: 108.1 kg (238 lb 5.1 oz)  Body mass index is 39.66 kg/m².    SpO2: 95 %  O2 Device (Oxygen Therapy): room air    Intake/Output - Last 3 Shifts       08/29 0700 - 08/30 0659 08/30 0700 - 08/31 0659 08/31 0700 - 09/01 0659    P.O. 518 810     I.V. (mL/kg)       IV Piggyback  300     Total Intake(mL/kg) 510  (5.2) 1110 (10.3)     Urine (mL/kg/hr) 405 (0.2) 400 (0.2)     Stool  0     Total Output 405 400     Net +113 +710            Urine Occurrence  2 x     Stool Occurrence 4 x 1 x           Lines/Drains/Airways     Peripheral Intravenous Line                 Peripheral IV - Single Lumen 08/28/19 0501 20 G Left Antecubital 3 days                Physical Exam   Constitutional: She is oriented to person, place, and time. She appears well-developed and well-nourished.   HENT:   Head: Normocephalic and atraumatic.   Eyes: Pupils are equal, round, and reactive to light. EOM are normal.   Neck: Normal range of motion.   Cardiovascular: Regular rhythm and normal pulses.   SR   Pulmonary/Chest: Effort normal. No respiratory distress.   Abdominal: Soft.   Musculoskeletal: Normal range of motion. She exhibits edema.   Neurological: She is alert and oriented to person, place, and time.   Skin: Skin is warm, dry and intact. Capillary refill takes less than 2 seconds.   Graft site with some swelling. No tenderness or erythema.   Bruising to both eyes from fall PTA is slowly resolving   Psychiatric: She has a normal mood and affect. Her speech is normal and behavior is normal. Judgment and thought content normal.   Vitals reviewed.      Significant Labs:  BMP:   Recent Labs   Lab 08/31/19  0423   *   *   K 3.9      CO2 18*   BUN 9   CREATININE 1.2   CALCIUM 7.7*   MG 1.7     CBC:   Recent Labs   Lab 08/31/19  0423   WBC 6.92   RBC 2.60*   HGB 7.1*   HCT 23.0*      MCV 89   MCH 27.3   MCHC 30.9*       Significant Diagnostics:  I have reviewed all pertinent imaging results/findings within the past 24 hours.

## 2019-08-31 NOTE — NURSING
IVP Labetalol given at 0020. Pt's current bp 209/94. Dr. Farrell notified. Ordered 5mg Hydralazine IVP.

## 2019-08-31 NOTE — ASSESSMENT & PLAN NOTE
H/H 7.1 from 7.0  CBC daily   Hold off transfusion for now as likely largely dilutional from admit Hb level

## 2019-08-31 NOTE — NURSING
Pt stated she would like to talk to the doctor about her diagnosis and plan of care and that no one had been by yet to see her. Spoke with transplant resident about pts anxiety about treatment plan and insistence to speak with team. She stated that the team had already rounded but that she would let the resident in charge of her care know that pt needed to speak to them. Pt updated, will continue to monitor.

## 2019-08-31 NOTE — ASSESSMENT & PLAN NOTE
DOREEN on CKD 3  s/p volume resuscitation, continue to monitor  Strict I/O  qAM BMP  Creatinine down to 1.2 stable  Restarting Lasix 20IV BID

## 2019-08-31 NOTE — NURSING
Pt still requesting to speak with MD about diagnosis and plan of care. Team called again. Spoke with transplant resident and updated her about pt instance on speaking with MD and increasing persistance. She stated that someone will be down to talk to her later today. Will continue to monitor.

## 2019-08-31 NOTE — PROGRESS NOTES
Pharmacokinetic Assessment Follow Up: IV Vancomycin    Vancomycin serum concentration assessment(s):    The trough level was drawn correctly and can be used to guide therapy at this time. The measurement is below the desired definitive target range of 15 to 20 mcg/mL.    Vancomycin Regimen Plan:    Change regimen to Vancomycin 1750 mg IV every 24 hours with next serum trough concentration measured at 1630 prior to 3rd dose on 9/2    Drug levels (last 3 results):  Recent Labs   Lab Result Units 08/29/19  0324 08/31/19  1323   Vancomycin, Random ug/mL 14.3  --    Vancomycin-Trough ug/mL  --  14.3       Pharmacy will continue to follow and monitor vancomycin.    Please contact pharmacy at extension [phone] for questions regarding this assessment.    Thank you for the consult,   Macy Ramos       Patient brief summary:  Kaylee Romero is a 74 y.o. female initiated on antimicrobial therapy with IV Vancomycin for treatment of bacteremia    The patient's current regimen is vancomycin 1500 mg q24h    Drug Allergies:   Review of patient's allergies indicates:   Allergen Reactions    Bactrim [sulfamethoxazole-trimethoprim] Other (See Comments)     Generic version  Sulfa makes her sick    Keflex [cephalexin] Other (See Comments)     Turns orange       Actual Body Weight:   108.1 kg    Renal Function:   Estimated Creatinine Clearance: 51.8 mL/min (based on SCr of 1.2 mg/dL).,     Dialysis Method (if applicable):  none    CBC (last 72 hours):  Recent Labs   Lab Result Units 08/29/19  0324 08/29/19  1108 08/30/19  0459 08/31/19  0423   WBC K/uL 10.25 9.26 7.55 6.92   Hemoglobin g/dL 7.1* 7.2* 7.0* 7.1*   Hematocrit % 22.9* 22.6* 21.7* 23.0*   Platelets K/uL 230 221 264 236   Gran% % 85.2* 84.6* 78.9* 76.0*   Lymph% % 6.6* 6.5* 10.7* 12.9*   Mono% % 6.7 6.3 7.8 7.2   Eosinophil% % 0.9 1.3 1.9 2.5   Basophil% % 0.1 0.2 0.3 0.4   Differential Method  Automated Automated Automated Automated       Metabolic Panel (last 72  hours):  Recent Labs   Lab Result Units 08/28/19  1817 08/29/19  0324 08/29/19  1108 08/30/19  0459 08/30/19  0816 08/31/19  0423   Sodium mmol/L 134* 134*  134* 134* 133*  --  135*   Potassium mmol/L 4.3 4.6  4.6 3.9 3.8  --  3.9   Chloride mmol/L 104 105  105 106 107  --  108   CO2 mmol/L 21* 18*  18* 19* 19*  --  18*   Glucose mg/dL 225* 161*  161* 219* 181*  --  226*   BUN, Bld mg/dL 27* 23  23 18 13  --  9   Creatinine mg/dL 1.9* 1.7*  1.7* 1.6* 1.2  --  1.2   Magnesium mg/dL  --  2.1 1.9  --  1.8 1.7   Phosphorus mg/dL  --  2.6*  --   --  1.4* 1.3*       Vancomycin Administrations:  vancomycin given in the last 96 hours                   vancomycin 1.5 g in dextrose 5 % 250 mL IVPB (ready to mix) (mg) 1,500 mg New Bag 08/30/19 1531    vancomycin 1.5 g in dextrose 5 % 250 mL IVPB (ready to mix) (mg) 1,500 mg New Bag 08/29/19 1350                Microbiologic Results:  Microbiology Results (last 7 days)     Procedure Component Value Units Date/Time    Blood culture [[id number]] Collected:  08/29/19 1108    Order Status:  Completed Specimen:  Blood from Peripheral, Wrist, Right Updated:  08/31/19 1412     Blood Culture, Routine No Growth to date      No Growth to date      No Growth to date    Blood culture x two cultures. Draw prior to antibiotics. [[id number]] Collected:  08/28/19 0036    Order Status:  Completed Specimen:  Blood from Peripheral, Hand, Right Updated:  08/31/19 0612     Blood Culture, Routine No Growth to date      No Growth to date      No Growth to date      No Growth to date    Narrative:       Aerobic and anaerobic    C Diff Toxin by PCR [[id number]] Collected:  08/30/19 1532    Order Status:  Completed Updated:  08/30/19 2330     C. diff PCR Negative    Clostridium difficile EIA [[id number]]  (Abnormal) Collected:  08/30/19 9130    Order Status:  Completed Specimen:  Stool Updated:  08/30/19 9827     C. diff Antigen Positive     C difficile Toxins A+B, EIA Negative     Comment: Testing  not recommended for children <24 months old.       Blood culture x two cultures. Draw prior to antibiotics. [[id number]]  (Abnormal)  (Susceptibility) Collected:  08/28/19 0036    Order Status:  Completed Specimen:  Blood from Peripheral, Hand, Left Updated:  08/30/19 1132     Blood Culture, Routine Gram stain susu bottle: Gram negative rods       Results called to and read back by: Marion Vincent RN 08/28/2019  11:17      Gram stain aer bottle: Gram positive cocci in clusters resembling Staph       Results called to and read back by: Amy Dunn RN  08/29/2019        12:12      ESCHERICHIA COLI      COAGULASE-NEGATIVE STAPHYLOCOCCUS SPECIES  Susceptibility testing not routinely performed.      Narrative:       Aerobic and anaerobic    Urine culture [[id number]] Collected:  08/28/19 1342    Order Status:  Completed Specimen:  Urine Updated:  08/29/19 2119     Urine Culture, Routine No growth    Narrative:       Preferred Collection Type->Urine, Catheterized

## 2019-09-01 LAB
ANION GAP SERPL CALC-SCNC: 10 MMOL/L (ref 8–16)
BASOPHILS # BLD AUTO: 0.04 K/UL (ref 0–0.2)
BASOPHILS NFR BLD: 0.6 % (ref 0–1.9)
BUN SERPL-MCNC: 7 MG/DL (ref 8–23)
CALCIUM SERPL-MCNC: 8.3 MG/DL (ref 8.7–10.5)
CHLORIDE SERPL-SCNC: 104 MMOL/L (ref 95–110)
CO2 SERPL-SCNC: 21 MMOL/L (ref 23–29)
CREAT SERPL-MCNC: 1.2 MG/DL (ref 0.5–1.4)
DIFFERENTIAL METHOD: ABNORMAL
EOSINOPHIL # BLD AUTO: 0.2 K/UL (ref 0–0.5)
EOSINOPHIL NFR BLD: 3.1 % (ref 0–8)
ERYTHROCYTE [DISTWIDTH] IN BLOOD BY AUTOMATED COUNT: 14.9 % (ref 11.5–14.5)
EST. GFR  (AFRICAN AMERICAN): 52.2 ML/MIN/1.73 M^2
EST. GFR  (NON AFRICAN AMERICAN): 45.3 ML/MIN/1.73 M^2
GLUCOSE SERPL-MCNC: 227 MG/DL (ref 70–110)
HCT VFR BLD AUTO: 23.8 % (ref 37–48.5)
HGB BLD-MCNC: 7.7 G/DL (ref 12–16)
IMM GRANULOCYTES # BLD AUTO: 0.12 K/UL (ref 0–0.04)
IMM GRANULOCYTES NFR BLD AUTO: 1.8 % (ref 0–0.5)
LYMPHOCYTES # BLD AUTO: 1.1 K/UL (ref 1–4.8)
LYMPHOCYTES NFR BLD: 15.9 % (ref 18–48)
MAGNESIUM SERPL-MCNC: 1.5 MG/DL (ref 1.6–2.6)
MCH RBC QN AUTO: 27.3 PG (ref 27–31)
MCHC RBC AUTO-ENTMCNC: 32.4 G/DL (ref 32–36)
MCV RBC AUTO: 84 FL (ref 82–98)
MONOCYTES # BLD AUTO: 0.6 K/UL (ref 0.3–1)
MONOCYTES NFR BLD: 9.4 % (ref 4–15)
NEUTROPHILS # BLD AUTO: 4.7 K/UL (ref 1.8–7.7)
NEUTROPHILS NFR BLD: 69.2 % (ref 38–73)
NRBC BLD-RTO: 0 /100 WBC
PHOSPHATE SERPL-MCNC: 3 MG/DL (ref 2.7–4.5)
PLATELET # BLD AUTO: 248 K/UL (ref 150–350)
PMV BLD AUTO: 10.4 FL (ref 9.2–12.9)
POCT GLUCOSE: 205 MG/DL (ref 70–110)
POCT GLUCOSE: 214 MG/DL (ref 70–110)
POCT GLUCOSE: 243 MG/DL (ref 70–110)
POCT GLUCOSE: 249 MG/DL (ref 70–110)
POTASSIUM SERPL-SCNC: 4.4 MMOL/L (ref 3.5–5.1)
RBC # BLD AUTO: 2.82 M/UL (ref 4–5.4)
SODIUM SERPL-SCNC: 135 MMOL/L (ref 136–145)
WBC # BLD AUTO: 6.79 K/UL (ref 3.9–12.7)

## 2019-09-01 PROCEDURE — 63600175 PHARM REV CODE 636 W HCPCS: Performed by: STUDENT IN AN ORGANIZED HEALTH CARE EDUCATION/TRAINING PROGRAM

## 2019-09-01 PROCEDURE — 83735 ASSAY OF MAGNESIUM: CPT

## 2019-09-01 PROCEDURE — 80048 BASIC METABOLIC PNL TOTAL CA: CPT

## 2019-09-01 PROCEDURE — 97166 OT EVAL MOD COMPLEX 45 MIN: CPT

## 2019-09-01 PROCEDURE — 25000003 PHARM REV CODE 250: Performed by: STUDENT IN AN ORGANIZED HEALTH CARE EDUCATION/TRAINING PROGRAM

## 2019-09-01 PROCEDURE — 25000003 PHARM REV CODE 250: Performed by: PHYSICIAN ASSISTANT

## 2019-09-01 PROCEDURE — 97537 COMMUNITY/WORK REINTEGRATION: CPT

## 2019-09-01 PROCEDURE — 85025 COMPLETE CBC W/AUTO DIFF WBC: CPT

## 2019-09-01 PROCEDURE — 20600001 HC STEP DOWN PRIVATE ROOM

## 2019-09-01 PROCEDURE — 36415 COLL VENOUS BLD VENIPUNCTURE: CPT

## 2019-09-01 PROCEDURE — 84100 ASSAY OF PHOSPHORUS: CPT

## 2019-09-01 RX ORDER — HYDRALAZINE HYDROCHLORIDE 20 MG/ML
10 INJECTION INTRAMUSCULAR; INTRAVENOUS ONCE
Status: COMPLETED | OUTPATIENT
Start: 2019-09-01 | End: 2019-09-01

## 2019-09-01 RX ORDER — ISOSORBIDE MONONITRATE 30 MG/1
30 TABLET, EXTENDED RELEASE ORAL DAILY
Status: DISCONTINUED | OUTPATIENT
Start: 2019-09-01 | End: 2019-09-02

## 2019-09-01 RX ORDER — MAGNESIUM SULFATE HEPTAHYDRATE 40 MG/ML
2 INJECTION, SOLUTION INTRAVENOUS ONCE
Status: COMPLETED | OUTPATIENT
Start: 2019-09-01 | End: 2019-09-01

## 2019-09-01 RX ORDER — LANOLIN ALCOHOL/MO/W.PET/CERES
400 CREAM (GRAM) TOPICAL 2 TIMES DAILY
Status: DISCONTINUED | OUTPATIENT
Start: 2019-09-02 | End: 2019-09-03

## 2019-09-01 RX ADMIN — INSULIN ASPART 2 UNITS: 100 INJECTION, SOLUTION INTRAVENOUS; SUBCUTANEOUS at 08:09

## 2019-09-01 RX ADMIN — FUROSEMIDE 20 MG: 10 INJECTION, SOLUTION INTRAMUSCULAR; INTRAVENOUS at 05:09

## 2019-09-01 RX ADMIN — FUROSEMIDE 20 MG: 10 INJECTION, SOLUTION INTRAMUSCULAR; INTRAVENOUS at 09:09

## 2019-09-01 RX ADMIN — LOSARTAN POTASSIUM 50 MG: 50 TABLET, FILM COATED ORAL at 09:09

## 2019-09-01 RX ADMIN — METRONIDAZOLE 500 MG: 500 TABLET ORAL at 01:09

## 2019-09-01 RX ADMIN — METOPROLOL TARTRATE 50 MG: 50 TABLET ORAL at 08:09

## 2019-09-01 RX ADMIN — MAGNESIUM SULFATE HEPTAHYDRATE 3 G: 500 INJECTION, SOLUTION INTRAMUSCULAR; INTRAVENOUS at 11:09

## 2019-09-01 RX ADMIN — METOPROLOL TARTRATE 50 MG: 50 TABLET ORAL at 09:09

## 2019-09-01 RX ADMIN — METRONIDAZOLE 500 MG: 500 TABLET ORAL at 08:09

## 2019-09-01 RX ADMIN — FAMOTIDINE 20 MG: 20 TABLET, FILM COATED ORAL at 09:09

## 2019-09-01 RX ADMIN — ACETAMINOPHEN 650 MG: 325 TABLET ORAL at 01:09

## 2019-09-01 RX ADMIN — ACETAMINOPHEN 650 MG: 325 TABLET ORAL at 05:09

## 2019-09-01 RX ADMIN — POTASSIUM PHOSPHATE, MONOBASIC AND POTASSIUM PHOSPHATE, DIBASIC 30 MMOL: 224; 236 INJECTION, SOLUTION, CONCENTRATE INTRAVENOUS at 12:09

## 2019-09-01 RX ADMIN — INSULIN ASPART 4 UNITS: 100 INJECTION, SOLUTION INTRAVENOUS; SUBCUTANEOUS at 12:09

## 2019-09-01 RX ADMIN — HEPARIN SODIUM 5000 UNITS: 5000 INJECTION, SOLUTION INTRAVENOUS; SUBCUTANEOUS at 08:09

## 2019-09-01 RX ADMIN — METRONIDAZOLE 500 MG: 500 TABLET ORAL at 05:09

## 2019-09-01 RX ADMIN — ISOSORBIDE MONONITRATE 30 MG: 30 TABLET, EXTENDED RELEASE ORAL at 12:09

## 2019-09-01 RX ADMIN — HEPARIN SODIUM 5000 UNITS: 5000 INJECTION, SOLUTION INTRAVENOUS; SUBCUTANEOUS at 01:09

## 2019-09-01 RX ADMIN — HYDRALAZINE HYDROCHLORIDE 10 MG: 20 INJECTION INTRAMUSCULAR; INTRAVENOUS at 01:09

## 2019-09-01 RX ADMIN — MAGNESIUM SULFATE IN WATER 2 G: 40 INJECTION, SOLUTION INTRAVENOUS at 09:09

## 2019-09-01 RX ADMIN — ASPIRIN 325 MG: 325 TABLET, DELAYED RELEASE ORAL at 09:09

## 2019-09-01 RX ADMIN — INSULIN ASPART 2 UNITS: 100 INJECTION, SOLUTION INTRAVENOUS; SUBCUTANEOUS at 12:09

## 2019-09-01 RX ADMIN — HEPARIN SODIUM 5000 UNITS: 5000 INJECTION, SOLUTION INTRAVENOUS; SUBCUTANEOUS at 05:09

## 2019-09-01 RX ADMIN — PIPERACILLIN AND TAZOBACTAM 4.5 G: 4; .5 INJECTION, POWDER, LYOPHILIZED, FOR SOLUTION INTRAVENOUS; PARENTERAL at 04:09

## 2019-09-01 RX ADMIN — PIPERACILLIN AND TAZOBACTAM 4.5 G: 4; .5 INJECTION, POWDER, LYOPHILIZED, FOR SOLUTION INTRAVENOUS; PARENTERAL at 05:09

## 2019-09-01 RX ADMIN — HYDRALAZINE HYDROCHLORIDE 10 MG: 20 INJECTION INTRAMUSCULAR; INTRAVENOUS at 06:09

## 2019-09-01 RX ADMIN — ATORVASTATIN CALCIUM 80 MG: 20 TABLET, FILM COATED ORAL at 09:09

## 2019-09-01 NOTE — ASSESSMENT & PLAN NOTE
73yo F post op from CABG 8/12 discharged 8/21 presenting with severe sepsis , DOREEN, and infected appearing LLE wound. Blood cultures drawn in ED + for GNR     Tylenol and Oxycodone prn  , Atorvastatin 80  Metop 50 BID   Losartan to 50mg. Will add Imdur today  On RA  Replete lytes K>4, Mg>2 --> starting scheduled mag daily  Cardiac diet as tolerated  Arfebrile, leukocytosis resolved. GNR+ Blood cultures 8/28 and C diff now positive. PO Flagyl started yesterday, will D/C Vanc and plan a 1 week total course of Zosyn (ending 9/5)

## 2019-09-01 NOTE — PROGRESS NOTES
[facility]  Cardiothoracic Surgery  Progress Note    Patient Name: Kaylee Romero  MRN: [id number]  Admission Date: 8/27/2019  Hospital Length of Stay: 4 days  Code Status: Full Code   Attending Physician: Peterson Ventura MD   Referring Provider: Self, Aaareferral  Principal Problem:<principal problem not specified>    Subjective:     Post-Op Info:  * No surgery found *         Interval History:   NAEON. Weakness improved. Need continued work with PT to get stronger. SBP lower in 180s' will add Imdur today. Phos adequate, Mg needs aggressive replacement today.    Review of Systems   Constitution: Negative for malaise/fatigue.   Cardiovascular: Positive for dyspnea on exertion. Negative for chest pain, leg swelling and palpitations.   Gastrointestinal: Negative for abdominal pain.   Genitourinary: Negative for dysuria.   Neurological: Positive for weakness.     Medications:  Continuous Infusions:    Scheduled Meds:   aspirin  325 mg Oral Daily    atorvastatin  80 mg Oral Daily    famotidine  20 mg Oral Daily    furosemide  20 mg Intravenous BID    heparin (porcine)  5,000 Units Subcutaneous Q8H    isosorbide mononitrate  30 mg Oral Daily    losartan  50 mg Oral Daily    [START ON 9/2/2019] magnesium oxide  400 mg Oral BID    magnesium sulfate IVPB  2 g Intravenous Once    magnesium sulfate IVPB  3 g Intravenous Once    metoprolol tartrate  50 mg Oral BID    metroNIDAZOLE  500 mg Oral Q8H    piperacillin-tazobactam (ZOSYN) IVPB  4.5 g Intravenous Q8H    polyethylene glycol  17 g Oral Daily    senna-docusate 8.6-50 mg  1 tablet Oral BID     PRN Meds:acetaminophen, bisacodyl, Dextrose 10% Bolus, glucagon (human recombinant), insulin aspart U-100, ondansetron, oxyCODONE, sodium chloride 0.9%     Objective:     Vital Signs (Most Recent):  Temp: 98 °F (36.7 °C) (09/01/19 0715)  Pulse: 95 (09/01/19 0715)  Resp: 18 (09/01/19 0715)  BP: (!) 140/78 (09/01/19 0715)  SpO2: 95 % (09/01/19  0715) Vital Signs (24h Range):  Temp:  [98 °F (36.7 °C)-99.2 °F (37.3 °C)] 98 °F (36.7 °C)  Pulse:  [81-96] 95  Resp:  [18-20] 18  SpO2:  [95 %-100 %] 95 %  BP: (140-181)/(73-89) 140/78     Weight: 103.7 kg (228 lb 9.9 oz)  Body mass index is 38.04 kg/m².    SpO2: 95 %  O2 Device (Oxygen Therapy): room air    Intake/Output - Last 3 Shifts       08/30 0700 - 08/31 0659 08/31 0700 - 09/01 0659 09/01 0700 - 09/02 0659    P.O. 810 750     IV Piggyback 300      Total Intake(mL/kg) 1110 (10.3) 750 (6.9)     Urine (mL/kg/hr) 400 (0.2) 200 (0.1)     Stool 0 0     Total Output 400 200     Net +710 +550            Urine Occurrence 2 x 1 x     Stool Occurrence 1 x 5 x           Lines/Drains/Airways     Peripheral Intravenous Line                 Peripheral IV - Single Lumen 09/01/19 0540 20 G Left Forearm less than 1 day                Physical Exam   Constitutional: She is oriented to person, place, and time. She appears well-developed and well-nourished.   HENT:   Head: Normocephalic and atraumatic.   Eyes: Pupils are equal, round, and reactive to light. EOM are normal.   Neck: Normal range of motion.   Cardiovascular: Regular rhythm and normal pulses.   SR   Pulmonary/Chest: Effort normal. No respiratory distress.   Abdominal: Soft.   Musculoskeletal: Normal range of motion. She exhibits edema.   Neurological: She is alert and oriented to person, place, and time.   Skin: Skin is warm, dry and intact. Capillary refill takes less than 2 seconds.   Graft site with some swelling. No tenderness or erythema.   Bruising to both eyes from fall PTA is slowly resolving   Psychiatric: She has a normal mood and affect. Her speech is normal and behavior is normal. Judgment and thought content normal.   Vitals reviewed.      Significant Labs:  BMP:   Recent Labs   Lab 09/01/19  0536   *   *   K 4.4      CO2 21*   BUN 7*   CREATININE 1.2   CALCIUM 8.3*   MG 1.5*     CBC:   Recent Labs   Lab 09/01/19  0536   WBC 6.79    RBC 2.82*   HGB 7.7*   HCT 23.8*      MCV 84   MCH 27.3   MCHC 32.4       Significant Diagnostics:  I have reviewed all pertinent imaging results/findings within the past 24 hours.    Assessment/Plan:     Sepsis  73yo F post op from CABG 8/12 discharged 8/21 presenting with severe sepsis , DOREEN, and infected appearing LLE wound. Blood cultures drawn in ED + for GNR     Tylenol and Oxycodone prn  , Atorvastatin 80  Metop 50 BID   Losartan to 50mg. Will add Imdur today  On RA  Replete lytes K>4, Mg>2 --> starting scheduled mag daily  Cardiac diet as tolerated  Arfebrile, leukocytosis resolved. GNR+ Blood cultures 8/28 and C diff now positive. PO Flagyl started yesterday, will D/C Vanc and plan a 1 week total course of Zosyn (ending 9/5)    Hypophosphatemia  Phos above 3.0 today  Daily labs     Acute blood loss anemia  H/H 7.7 from 7.1  CBC daily   Hold off transfusion for now as improving and largely dilutional from admit Hb level    Physical deconditioning  PT/OT   Planning for SNF placement     CKD (chronic kidney disease) stage 3, GFR 30-59 ml/min  DOREEN on CKD 3 s/p volume resuscitation  Strict I/O --> will need to assess UOP  qAM BMP  Creatinine 1.2 stable  Lasix 20IV BID         Morbid obesity with body mass index (BMI) of 40.0 or higher  PT/OT   Cardiac diet       Gayatri Garcia MD  Cardiothoracic Surgery  [facility]

## 2019-09-01 NOTE — PLAN OF CARE
Problem: Adult Inpatient Plan of Care  Goal: Plan of Care Review  Outcome: Ongoing (interventions implemented as appropriate)  Plan of care reviewed with pt. Pt remains free of falls/injury. BP elevated SBP 180s. MD notified. ordered Hydralazine 10mg IVP given.  No c/o of pain or SOB. Pts gait improving, standby assist. Educated on sternal precaution compliance, states understanding, but does not follow. Attempted IV access to gain second IV for multiple IV medications to give, IV access not able to be placed. IV potassium phosphate was given after it was sent up to unit Vanco and christiano were completed.  BSC done Q6H. Will continue to monitor.

## 2019-09-01 NOTE — SUBJECTIVE & OBJECTIVE
Interval History:   NAEON. Weakness improved. Need continued work with PT to get stronger. SBP lower in 180s' will add Imdur today. Phos adequate, Mg needs aggressive replacement today.    Review of Systems   Constitution: Negative for malaise/fatigue.   Cardiovascular: Positive for dyspnea on exertion. Negative for chest pain, leg swelling and palpitations.   Gastrointestinal: Negative for abdominal pain.   Genitourinary: Negative for dysuria.   Neurological: Positive for weakness.     Medications:  Continuous Infusions:    Scheduled Meds:   aspirin  325 mg Oral Daily    atorvastatin  80 mg Oral Daily    famotidine  20 mg Oral Daily    furosemide  20 mg Intravenous BID    heparin (porcine)  5,000 Units Subcutaneous Q8H    isosorbide mononitrate  30 mg Oral Daily    losartan  50 mg Oral Daily    [START ON 9/2/2019] magnesium oxide  400 mg Oral BID    magnesium sulfate IVPB  2 g Intravenous Once    magnesium sulfate IVPB  3 g Intravenous Once    metoprolol tartrate  50 mg Oral BID    metroNIDAZOLE  500 mg Oral Q8H    piperacillin-tazobactam (ZOSYN) IVPB  4.5 g Intravenous Q8H    polyethylene glycol  17 g Oral Daily    senna-docusate 8.6-50 mg  1 tablet Oral BID     PRN Meds:acetaminophen, bisacodyl, Dextrose 10% Bolus, glucagon (human recombinant), insulin aspart U-100, ondansetron, oxyCODONE, sodium chloride 0.9%     Objective:     Vital Signs (Most Recent):  Temp: 98 °F (36.7 °C) (09/01/19 0715)  Pulse: 95 (09/01/19 0715)  Resp: 18 (09/01/19 0715)  BP: (!) 140/78 (09/01/19 0715)  SpO2: 95 % (09/01/19 0715) Vital Signs (24h Range):  Temp:  [98 °F (36.7 °C)-99.2 °F (37.3 °C)] 98 °F (36.7 °C)  Pulse:  [81-96] 95  Resp:  [18-20] 18  SpO2:  [95 %-100 %] 95 %  BP: (140-181)/(73-89) 140/78     Weight: 103.7 kg (228 lb 9.9 oz)  Body mass index is 38.04 kg/m².    SpO2: 95 %  O2 Device (Oxygen Therapy): room air    Intake/Output - Last 3 Shifts       08/30 0700 - 08/31 0659 08/31 0700 - 09/01 0659 09/01 0700 -  09/02 0659    P.O. 810 750     IV Piggyback 300      Total Intake(mL/kg) 1110 (10.3) 750 (6.9)     Urine (mL/kg/hr) 400 (0.2) 200 (0.1)     Stool 0 0     Total Output 400 200     Net +710 +550            Urine Occurrence 2 x 1 x     Stool Occurrence 1 x 5 x           Lines/Drains/Airways     Peripheral Intravenous Line                 Peripheral IV - Single Lumen 09/01/19 0540 20 G Left Forearm less than 1 day                Physical Exam   Constitutional: She is oriented to person, place, and time. She appears well-developed and well-nourished.   HENT:   Head: Normocephalic and atraumatic.   Eyes: Pupils are equal, round, and reactive to light. EOM are normal.   Neck: Normal range of motion.   Cardiovascular: Regular rhythm and normal pulses.   SR   Pulmonary/Chest: Effort normal. No respiratory distress.   Abdominal: Soft.   Musculoskeletal: Normal range of motion. She exhibits edema.   Neurological: She is alert and oriented to person, place, and time.   Skin: Skin is warm, dry and intact. Capillary refill takes less than 2 seconds.   Graft site with some swelling. No tenderness or erythema.   Bruising to both eyes from fall PTA is slowly resolving   Psychiatric: She has a normal mood and affect. Her speech is normal and behavior is normal. Judgment and thought content normal.   Vitals reviewed.      Significant Labs:  BMP:   Recent Labs   Lab 09/01/19  0536   *   *   K 4.4      CO2 21*   BUN 7*   CREATININE 1.2   CALCIUM 8.3*   MG 1.5*     CBC:   Recent Labs   Lab 09/01/19  0536   WBC 6.79   RBC 2.82*   HGB 7.7*   HCT 23.8*      MCV 84   MCH 27.3   MCHC 32.4       Significant Diagnostics:  I have reviewed all pertinent imaging results/findings within the past 24 hours.

## 2019-09-01 NOTE — ASSESSMENT & PLAN NOTE
H/H 7.7 from 7.1  CBC daily   Hold off transfusion for now as improving and largely dilutional from admit Hb level

## 2019-09-01 NOTE — PROGRESS NOTES
IV Potassium phos and IV abx both not given during day. Vanco finished and given IV Zosin, waiting for Potassium Phos to be sent up by pharmacy and will hang after. Reg=cem RN attempted IV access, pt wants to wait before getting stuck again. Informed pt of late meds and importance of second IV. Pt states understanding.

## 2019-09-01 NOTE — PLAN OF CARE
Problem: Occupational Therapy Goal  Goal: Occupational Therapy Goal  Goals to be met by: 10/1/2019    Patient will increase functional independence with ADLs by performing:    Using sock aid for donning, and dressing stick for doffing socks LE Dressing with Set-up Assistance.  Grooming while standing at sink with Set-up Assistance.  Toileting from toilet with Modified [place] and Assistive Devices as needed for hygiene and clothing management.   Bathing from  standing at sink with Modified [place].  Supine to sit with Set-up Assistance while observing sternal precautions.  Toilet transfer to toilet with Modified [place].    Outcome: Ongoing (interventions implemented as appropriate)  Eval complete. Pt tolerated session well. Initiate OT POC.    Discharge Recommendation: Rehab  DME rec: drop arm BSC

## 2019-09-01 NOTE — PROGRESS NOTES
Pts IV is hurting. IV draining and arm looks red. IV access attempted by charge, primary and other RNs. ICU RNs and [place] busy. MD informed and will call anesthesia. IV zosin due.

## 2019-09-01 NOTE — PLAN OF CARE
Problem: Adult Inpatient Plan of Care  Goal: Plan of Care Review  Outcome: Ongoing (interventions implemented as appropriate)  Plan of care discussed with patient.  Patient ambulating with 1 assist/standby, fall precautions in place.Continuing to encourage sternal precautions, IS, and ambulation. Pt is currently noncompliant with sternal precautions, education re-enforced and encouraged during ambulation and shifting positions. Patient has no complaints of pain. Discussed medications and care. Still receiving Zosyn q8h. Vanco d/c'ed. Being diuresed with 20 mg IVP lasix BID. Electrolytes replaced as ordered. Patient has no questions at this time. Will continue to monitor.

## 2019-09-01 NOTE — PT/OT/SLP EVAL
Occupational Therapy   Evaluation    Name: Kaylee Romero  MRN: [id number]  Admitting Diagnosis:  <principal problem not specified>      Recommendations:     Discharge Recommendations: rehabilitation facility  Discharge Equipment Recommendations:  other (see comments)(drop arm commode)  Barriers to discharge:  None    Assessment:     Kaylee Romero is a 74 y.o. female with a medical diagnosis of <principal problem not specified>.  She presents with signs of depression and grief regarding her loss of independence.  Pt was not willing to perform most adls 2* fatigue, but willing to demo BUE function. Performance deficits affecting function: weakness, impaired endurance, gait instability, impaired functional mobilty, impaired self care skills, impaired balance, impaired cognition, decreased upper extremity function, decreased lower extremity function, decreased safety awareness, pain, decreased ROM, edema, impaired cardiopulmonary response to activity.      Rehab Prognosis: Fair; patient would benefit from acute skilled OT services to address these deficits and reach maximum level of function.       Plan:     Patient to be seen 4 x/week to address the above listed problems via self-care/home management, therapeutic activities, therapeutic exercises  · Plan of Care Expires: 10/01/19  · Plan of Care Reviewed with: patient    Subjective     Chief Complaint: weakness, fatigue.  Patient/Family Comments/goals: Medical management and discharge.       Occupational Profile:  Living Environment: Pt lives in a one story house c 4 PA[address] and has B rails.    Previous level of function: I PTA  Equipment Used at Home:  walker, rolling, cane, straight, CPAP, shower chair  Assistance upon Discharge:     Pain/Comfort:  · Pain Rating 1: 0/10  · Pain Rating Post-Intervention 1: 0/10    Patients cultural, spiritual, Anabaptist conflicts given the current situation: no    Objective:     Communicated with: RN prior to session.   Patient found up in chair with oxygen, peripheral IV, pulse ox (continuous), telemetry upon OT entry to room.    General Precautions: Standard, fall   Orthopedic Precautions:(sternal precautions)   Braces: N/A     Occupational Performance:    Bed Mobility:    · NT    Functional Mobility/Transfers:  · Pt refused 2* fatigue/weakness    Activities of Daily Living:  · Feeding:  independence seated in chair  · Grooming: independence seated in chair    Cognitive/Visual Perceptual:  Completely cognitively intact adult w/ glasses worn or present during eval. Pt was very concerned about her most recent decline in function.     Physical Exam:  Balance:    -       seated: Fair  Dominant hand:    -       RH  Upper Extremity Range of Motion:     -       Right Upper Extremity: WFL  -       Left Upper Extremity: WFL  Upper Extremity Strength:    -       Right Upper Extremity: WFL  -       Left Upper Extremity: WFL   Strength:    -       Right Upper Extremity: WFL  -       Left Upper Extremity: WFL  Fine Motor Coordination:    -       Intact  Gross motor coordination:   WFL    AMPAC 6 Click ADL:  AMPAC Total Score: 14    Treatment & Education:  -Pt edu on OT role/POC  -Importance of OOB activity with staff assistance  -Safety during functional t/f and mobility  - White board updated  - Multiple self care tasks/functional mobility completed-- assistance level noted above  - All questions/concerns answered within OT scope of practice    Education:    Patient left up in chair with all lines intact, call button in reach and RN notified    GOALS:   Multidisciplinary Problems     Occupational Therapy Goals        Problem: Occupational Therapy Goal    Goal Priority Disciplines Outcome Interventions   Occupational Therapy Goal     OT, PT/OT Ongoing (interventions implemented as appropriate)    Description:  Goals to be met by: 10/1/2019    Patient will increase functional independence with ADLs by performing:    Using sock aid for  donning, and dressing stick for doffing socks LE Dressing with Set-up Assistance.  Grooming while standing at sink with Set-up Assistance.  Toileting from toilet with Modified [place] and Assistive Devices as needed for hygiene and clothing management.   Bathing from  standing at sink with Modified [place].  Supine to sit with Set-up Assistance while observing sternal precautions.  Toilet transfer to toilet with Modified [place].                      History:     Past Medical History:   Diagnosis Date    Acid reflux     Age-related osteoporosis without current pathological fracture 2019    Cataract     CKD (chronic kidney disease) stage 3, GFR 30-59 ml/min     Dry mouth     History of TIA (transient ischemic attack) 2018    Hyperlipidemia 2014    Hypertensive heart disease with diastolic heart failure 2012    Morbid obesity with body mass index (BMI) of 40.0 or higher 2016    KAMRAN (obstructive sleep apnea)     KAMRAN on CPAP 2016    Osteoporosis without current pathological fracture 2018    Physical deconditioning 2018    Status post total left knee replacement 2017    Type 2 diabetes mellitus with stage 3 chronic kidney disease, without long-term current use of insulin 2019       Past Surgical History:   Procedure Laterality Date    ankle surgery (l)      APPENDECTOMY       SECTION      CORONARY ARTERY BYPASS GRAFT (CABG)X3 N/A 2019    Performed by Peterson [facility]ura MD at [facility] OR [address] [place] [address]    DILATION AND CURETTAGE OF UTERUS      KNEE ARTHROSCOPY W/ DEBRIDEMENT      KNEE SURGERY Left 2017    TKR    Left heart cath Left 2019    Performed by Ronak Gibbons MD at [facility] CATH LAB    REPLACEMENT-KNEE-TOTAL Left 2017    [facility]    SURGICAL PROCUREMENT, VEIN, ENDOSCOPIC Left 2019    Performed by Peterson Ventura MD at [facility] OR [address] [place] [address]        Time Tracking:     OT Date of Treatment: 09/01/19  OT Start Time: 1406  OT Stop Time: 1509  OT Total Time (min): 63 min    Billable Minutes:Evaluation 10 mins  Therapeutic Activity 53 mins    Mehrdad Cross, OT  9/1/2019

## 2019-09-01 NOTE — ASSESSMENT & PLAN NOTE
DOREEN on CKD 3 s/p volume resuscitation  Strict I/O --> will need to assess UOP  qAM BMP  Creatinine 1.2 stable  Lasix 20IV BID

## 2019-09-02 LAB
ANION GAP SERPL CALC-SCNC: 9 MMOL/L (ref 8–16)
BACTERIA BLD CULT: NORMAL
BASOPHILS # BLD AUTO: 0.03 K/UL (ref 0–0.2)
BASOPHILS NFR BLD: 0.5 % (ref 0–1.9)
BUN SERPL-MCNC: 7 MG/DL (ref 8–23)
CALCIUM SERPL-MCNC: 8.4 MG/DL (ref 8.7–10.5)
CHLORIDE SERPL-SCNC: 104 MMOL/L (ref 95–110)
CO2 SERPL-SCNC: 23 MMOL/L (ref 23–29)
CREAT SERPL-MCNC: 1.2 MG/DL (ref 0.5–1.4)
DIFFERENTIAL METHOD: ABNORMAL
EOSINOPHIL # BLD AUTO: 0.2 K/UL (ref 0–0.5)
EOSINOPHIL NFR BLD: 3.4 % (ref 0–8)
ERYTHROCYTE [DISTWIDTH] IN BLOOD BY AUTOMATED COUNT: 15.2 % (ref 11.5–14.5)
EST. GFR  (AFRICAN AMERICAN): 52.2 ML/MIN/1.73 M^2
EST. GFR  (NON AFRICAN AMERICAN): 45.3 ML/MIN/1.73 M^2
GLUCOSE SERPL-MCNC: 196 MG/DL (ref 70–110)
HCT VFR BLD AUTO: 23.3 % (ref 37–48.5)
HGB BLD-MCNC: 7.2 G/DL (ref 12–16)
IMM GRANULOCYTES # BLD AUTO: 0.13 K/UL (ref 0–0.04)
IMM GRANULOCYTES NFR BLD AUTO: 2.1 % (ref 0–0.5)
LYMPHOCYTES # BLD AUTO: 1.1 K/UL (ref 1–4.8)
LYMPHOCYTES NFR BLD: 16.8 % (ref 18–48)
MAGNESIUM SERPL-MCNC: 1.9 MG/DL (ref 1.6–2.6)
MCH RBC QN AUTO: 26.6 PG (ref 27–31)
MCHC RBC AUTO-ENTMCNC: 30.9 G/DL (ref 32–36)
MCV RBC AUTO: 86 FL (ref 82–98)
MONOCYTES # BLD AUTO: 0.6 K/UL (ref 0.3–1)
MONOCYTES NFR BLD: 8.8 % (ref 4–15)
NEUTROPHILS # BLD AUTO: 4.3 K/UL (ref 1.8–7.7)
NEUTROPHILS NFR BLD: 68.4 % (ref 38–73)
NRBC BLD-RTO: 0 /100 WBC
PHOSPHATE SERPL-MCNC: 2.7 MG/DL (ref 2.7–4.5)
PLATELET # BLD AUTO: 253 K/UL (ref 150–350)
PMV BLD AUTO: 10 FL (ref 9.2–12.9)
POCT GLUCOSE: 201 MG/DL (ref 70–110)
POCT GLUCOSE: 220 MG/DL (ref 70–110)
POCT GLUCOSE: 253 MG/DL (ref 70–110)
POCT GLUCOSE: 258 MG/DL (ref 70–110)
POCT GLUCOSE: 290 MG/DL (ref 70–110)
POTASSIUM SERPL-SCNC: 3.3 MMOL/L (ref 3.5–5.1)
RBC # BLD AUTO: 2.71 M/UL (ref 4–5.4)
SODIUM SERPL-SCNC: 136 MMOL/L (ref 136–145)
WBC # BLD AUTO: 6.24 K/UL (ref 3.9–12.7)

## 2019-09-02 PROCEDURE — 25000003 PHARM REV CODE 250: Performed by: STUDENT IN AN ORGANIZED HEALTH CARE EDUCATION/TRAINING PROGRAM

## 2019-09-02 PROCEDURE — 63600175 PHARM REV CODE 636 W HCPCS: Performed by: STUDENT IN AN ORGANIZED HEALTH CARE EDUCATION/TRAINING PROGRAM

## 2019-09-02 PROCEDURE — 25000003 PHARM REV CODE 250: Performed by: PHYSICIAN ASSISTANT

## 2019-09-02 PROCEDURE — 20600001 HC STEP DOWN PRIVATE ROOM

## 2019-09-02 PROCEDURE — 97162 PT EVAL MOD COMPLEX 30 MIN: CPT

## 2019-09-02 PROCEDURE — 80048 BASIC METABOLIC PNL TOTAL CA: CPT

## 2019-09-02 PROCEDURE — 97530 THERAPEUTIC ACTIVITIES: CPT

## 2019-09-02 PROCEDURE — 83735 ASSAY OF MAGNESIUM: CPT

## 2019-09-02 PROCEDURE — 84100 ASSAY OF PHOSPHORUS: CPT

## 2019-09-02 PROCEDURE — 36415 COLL VENOUS BLD VENIPUNCTURE: CPT

## 2019-09-02 PROCEDURE — 25000003 PHARM REV CODE 250: Performed by: SURGERY

## 2019-09-02 PROCEDURE — 85025 COMPLETE CBC W/AUTO DIFF WBC: CPT

## 2019-09-02 RX ORDER — HYDRALAZINE HYDROCHLORIDE 20 MG/ML
10 INJECTION INTRAMUSCULAR; INTRAVENOUS ONCE
Status: COMPLETED | OUTPATIENT
Start: 2019-09-02 | End: 2019-09-02

## 2019-09-02 RX ORDER — MAGNESIUM SULFATE HEPTAHYDRATE 40 MG/ML
2 INJECTION, SOLUTION INTRAVENOUS ONCE
Status: DISCONTINUED | OUTPATIENT
Start: 2019-09-02 | End: 2019-09-03

## 2019-09-02 RX ORDER — POTASSIUM CHLORIDE 20 MEQ/1
60 TABLET, EXTENDED RELEASE ORAL ONCE
Status: COMPLETED | OUTPATIENT
Start: 2019-09-02 | End: 2019-09-02

## 2019-09-02 RX ORDER — ISOSORBIDE MONONITRATE 60 MG/1
60 TABLET, EXTENDED RELEASE ORAL DAILY
Status: DISCONTINUED | OUTPATIENT
Start: 2019-09-03 | End: 2019-09-03

## 2019-09-02 RX ADMIN — METOPROLOL TARTRATE 50 MG: 50 TABLET ORAL at 08:09

## 2019-09-02 RX ADMIN — PIPERACILLIN AND TAZOBACTAM 4.5 G: 4; .5 INJECTION, POWDER, LYOPHILIZED, FOR SOLUTION INTRAVENOUS; PARENTERAL at 05:09

## 2019-09-02 RX ADMIN — INSULIN ASPART 2 UNITS: 100 INJECTION, SOLUTION INTRAVENOUS; SUBCUTANEOUS at 06:09

## 2019-09-02 RX ADMIN — ISOSORBIDE MONONITRATE 30 MG: 30 TABLET, EXTENDED RELEASE ORAL at 11:09

## 2019-09-02 RX ADMIN — MAGNESIUM OXIDE TAB 400 MG (241.3 MG ELEMENTAL MG) 400 MG: 400 (241.3 MG) TAB at 08:09

## 2019-09-02 RX ADMIN — HEPARIN SODIUM 5000 UNITS: 5000 INJECTION, SOLUTION INTRAVENOUS; SUBCUTANEOUS at 05:09

## 2019-09-02 RX ADMIN — ACETAMINOPHEN 650 MG: 325 TABLET ORAL at 05:09

## 2019-09-02 RX ADMIN — METRONIDAZOLE 500 MG: 500 TABLET ORAL at 05:09

## 2019-09-02 RX ADMIN — HYDRALAZINE HYDROCHLORIDE 10 MG: 20 INJECTION INTRAMUSCULAR; INTRAVENOUS at 05:09

## 2019-09-02 RX ADMIN — FUROSEMIDE 20 MG: 10 INJECTION, SOLUTION INTRAMUSCULAR; INTRAVENOUS at 08:09

## 2019-09-02 RX ADMIN — LOSARTAN POTASSIUM 50 MG: 50 TABLET, FILM COATED ORAL at 08:09

## 2019-09-02 RX ADMIN — INSULIN ASPART 3 UNITS: 100 INJECTION, SOLUTION INTRAVENOUS; SUBCUTANEOUS at 11:09

## 2019-09-02 RX ADMIN — PIPERACILLIN AND TAZOBACTAM 4.5 G: 4; .5 INJECTION, POWDER, LYOPHILIZED, FOR SOLUTION INTRAVENOUS; PARENTERAL at 12:09

## 2019-09-02 RX ADMIN — FUROSEMIDE 20 MG: 10 INJECTION, SOLUTION INTRAMUSCULAR; INTRAVENOUS at 05:09

## 2019-09-02 RX ADMIN — INSULIN ASPART 6 UNITS: 100 INJECTION, SOLUTION INTRAVENOUS; SUBCUTANEOUS at 04:09

## 2019-09-02 RX ADMIN — FAMOTIDINE 20 MG: 20 TABLET, FILM COATED ORAL at 08:09

## 2019-09-02 RX ADMIN — PIPERACILLIN AND TAZOBACTAM 4.5 G: 4; .5 INJECTION, POWDER, LYOPHILIZED, FOR SOLUTION INTRAVENOUS; PARENTERAL at 09:09

## 2019-09-02 RX ADMIN — INSULIN ASPART 4 UNITS: 100 INJECTION, SOLUTION INTRAVENOUS; SUBCUTANEOUS at 01:09

## 2019-09-02 RX ADMIN — HEPARIN SODIUM 5000 UNITS: 5000 INJECTION, SOLUTION INTRAVENOUS; SUBCUTANEOUS at 01:09

## 2019-09-02 RX ADMIN — ONDANSETRON 8 MG: 4 TABLET, ORALLY DISINTEGRATING ORAL at 11:09

## 2019-09-02 RX ADMIN — HEPARIN SODIUM 5000 UNITS: 5000 INJECTION, SOLUTION INTRAVENOUS; SUBCUTANEOUS at 08:09

## 2019-09-02 RX ADMIN — ATORVASTATIN CALCIUM 80 MG: 20 TABLET, FILM COATED ORAL at 08:09

## 2019-09-02 RX ADMIN — POTASSIUM CHLORIDE 60 MEQ: 20 TABLET, EXTENDED RELEASE ORAL at 01:09

## 2019-09-02 RX ADMIN — ASPIRIN 325 MG: 325 TABLET, DELAYED RELEASE ORAL at 08:09

## 2019-09-02 NOTE — ASSESSMENT & PLAN NOTE
75yo F post op from CABG 8/12 discharged 8/21 presenting with severe sepsis, DOREEN, and Ecoli bacteremia likely 2/2 to UTI now c/o head pain     CT head to rule out acute intracranial process given elevated BP and headache  Given neg Cdiff PCR, will d/c flagyl  Tylenol and Oxycodone prn  , Atorvastatin 80  Metop 50 BID   Losartan to 50mg. Increase Imdur to 60 qd  On RA  Replete lytes K>4, Mg>2 --> starting scheduled mag daily  Cardiac diet as tolerated  Plan a 1 week total course of Zosyn (ending 9/5)

## 2019-09-02 NOTE — PLAN OF CARE
"Problem: Adult Inpatient Plan of Care  Goal: Plan of Care Review  Kaylee Romero is a 74 y.o. female admitted to [facility] on 8/27/2019 for sepsis. Pt with recent CABG surgery on 8/12/19. Kaylee Romero tolerated evaluation fair today. She was sitting up in chair, seemingly resting well upon my entry to room. She was very easily irritated throughout my assessment. Frequently stating "I don't understand how PT/OT is important right now when my blood pressure is uncontrolled and I feel extremely nauseated." After much discussion she agreed to mobilize. Stands from chair with stand-by assist within sternal precautions (she's able to verbalize her precautions without cueing). Ambulates 25 ft within room with CGA of therapist for safety, mostly limited by patient's SOB but also on contact precautions. Pt apologetic at end of sessio for her behavior, I allowed her to vent her frustration in regards to be re-hospitalized. She is very agreeable to SNF upon discharge in hopes of getting stronger and improving her endurance (she'd like to do [facility] down the street). Discussed PT role, POC, goals, sternal precautions and recommendations (SNF) with patient; verbalized understanding. Kaylee Romero would benefit from acute PT services to promote mobility during this admission and improve return to PLOF.    **Safe to ambulate with nsg assistance within room or in hallways, use gait belt (if available on unit). Would be helpful to have 2nd person following with chair if able**    Riccardo Brown, PT  9/2/2019      "

## 2019-09-02 NOTE — PROGRESS NOTES
09/02/19 0432   Vital Signs   BP (!) 193/86   MAP (mmHg) 98   BP Location Left arm   BP Method Automatic   Patient Position Lying     Will notify MD

## 2019-09-02 NOTE — PT/OT/SLP EVAL
"Physical Therapy  Evaluation and Treatment    Kaylee Romero   [id number]    Time Tracking:     PT Received On: 09/02/19   PT Start Time: 1222   PT Stop Time: 1245   PT Total Time (min): 23 min    Billable Minutes: Evaluation 15 and Therapeutic Activity 8      Recommendations:     Discharge recommendations: Skilled Nursing Facility (short-term)     Equipment recommendations: TBD at next level of care    Barriers to Discharge: Inaccessible home environment (4 PAUL)    Patient Information:     Recent Surgery: s/p CABG x 2 on 8/12/19    Diagnosis: Sepsis    Length of Stay: 5 days    General Precautions: Standard, fall, cardiac  Orthopedic Precautions: Sternal precautions (avoid pushing/pulling of BUE)    Assessment:     Kaylee Romero is a 74 y.o. female admitted to [facility] on 8/27/2019 for sepsis. Pt with recent CABG surgery on 8/12/19. Kaylee Romero tolerated evaluation fair today. She was sitting up in chair, seemingly resting well upon my entry to room. She was very easily irritated throughout my assessment. Frequently stating "I don't understand how PT/OT is important right now when my blood pressure is uncontrolled and I feel extremely nauseated." After much discussion she agreed to mobilize. Stands from chair with stand-by assist within sternal precautions (she's able to verbalize her precautions without cueing). Ambulates 25 ft within room with CGA of therapist for safety, mostly limited by patient's SOB but also on contact precautions. Pt apologetic at end of sessio for her behavior, I allowed her to vent her frustration in regards to be re-hospitalized. She is very agreeable to SNF upon discharge in hopes of getting stronger and improving her endurance (she'd like to do [facility] down the street). Discussed PT role, POC, goals, sternal precautions and recommendations (SNF) with patient; verbalized understanding. Kaylee Romero would benefit from acute PT services to promote mobility during this " admission and improve return to OF.    Problem List: weakness, decreased endurance, impaired self-care skills, impaired mobility, decreased sitting or standing balance, gait instability, orthopedic and/or sternal precautions, impaired cardiopulmonary response to activity and pain    Rehab Prognosis: Good; patient would benefit from acute skilled PT services to address these deficits and reach maximum level of function.    Plan:     Patient to be seen 3 x/week to address the above listed problems via gait training, therapeutic activities, therapeutic exercises    Plan of Care Expires: 10/01/19  Plan of Care reviewed with: patient    Subjective:     Communicated with RN prior to evaluation, appropriate to see for evaluation.    Pt found sitting up in bedside chair upon PT entry to room, agreeable to evaluation.    Does this patient have any cultural, spiritual, Advent conflicts given the current situation? Patient has no barriers to learning. Patient verbalizes understanding of his/her program and goals and demonstrates them correctly. No cultural, spiritual, or educational needs identified.    Past Medical History:   Diagnosis Date    Acid reflux     Age-related osteoporosis without current pathological fracture 1/11/2019    Cataract     CKD (chronic kidney disease) stage 3, GFR 30-59 ml/min     Dry mouth     History of TIA (transient ischemic attack) 12/11/2018    Hyperlipidemia 12/2/2014    Hypertensive heart disease with diastolic heart failure 11/28/2012    Morbid obesity with body mass index (BMI) of 40.0 or higher 5/9/2016    KAMRAN (obstructive sleep apnea)     KAMRAN on CPAP 6/28/2016    Osteoporosis without current pathological fracture 11/11/2018    Physical deconditioning 11/5/2018    Status post total left knee replacement 5/1/2017 5/16/2017    Type 2 diabetes mellitus with stage 3 chronic kidney disease, without long-term current use of insulin 5/16/2019     Past Surgical History:  "  Procedure Laterality Date    ankle surgery (l)      APPENDECTOMY       SECTION      CORONARY ARTERY BYPASS GRAFT (CABG)X3 N/A 2019    [facility]    DILATION AND CURETTAGE OF UTERUS      KNEE ARTHROSCOPY W/ DEBRIDEMENT      KNEE SURGERY Left 2017    T[facility]    Left heart cath Left 2019    Performed by Ronak Gibbons MD at [facility] CATH LAB    REPLACEMENT-KNEE-TOTAL Left 2017    [facility]    SURGICAL PROCUREMENT, VEIN, ENDOSCOPIC Left 2019    [facility]     Living Environment:  Pt lives with her spouse in a 1  with 4 PAUL, R HR available.    PLOF:  Prior to admission, patient with recent CABG surgery on . Endorses she's been weak at home, had frequent falls and hit her head 1-2x. States most of her falls came with "sliding out the bed when I'm trying to get up" (then venting that nobody ever told her she'd have sternal precautions pre-op, she wasn't prepared to be able to move without using her arms to push up out of bed). Spouse has been able to assist with ADL's.    DME:  Patient owns or has access to the following DME: [employer]    Upon discharge, patient will have assistance from spouse.    Objective:     Patient found with: peripheral IV, telemetry    Pain:  Pain Rating 1: 0/10; pain seems controlled, more limited by nausea  Pain Rating Post-Intervention 1: 0/10    Cognitive Exam:  Patient is oriented to Person, Place, Time and Situation.  Patient follows 100% of single-step commands.    Sensation:   Intact    Lower Extremity Range of Motion:  Right Lower Extremity: WFL  Left Lower Extremity: WFL    Lower Extremity Strength:  Right Lower Extremity: grossly 4-/5 via MMT at BS chair  Left Lower Extremity: grossly 4-/5 via MMT at BS chair    Functional Mobility:    · Bed Mobility:  · NT; sitting up in chair before and after " session    · Transfers:  · Sit to Stand: SBA from BS chair with no AD, within sternal precautions x 1 trial(s)  · Stand to Sit: SBA to BS chair with no AD x 1 trial(s)    · Gait:  · 25 feet within room with CGA of therapist for safety, mostly limited by patient's SOB but also on contact precautions    · Assist level: Contact-Guard Assist  · Device: no AD    · Balance:  · Static Sit: Stand-By Assist at edge of chair; assisted with donning 2nd gown for mobility    · Static Stand: Contact-Guard Assist with no AD    Additional Therapeutic Activity/Exercises:     1. Discussed PT role, POC, goals and recommendations with patient and/or family; verbalized understanding.    2. Whiteboard was updated.    AM-PAC 6 CLICK MOBILITY  Turning over in bed (including adjusting bedclothes, sheets and blankets)?: 3  Sitting down on and standing up from a chair with arms (e.g., wheelchair, bedside commode, etc.): 4  Moving from lying on back to sitting on the side of the bed?: 3  Moving to and from a bed to a chair (including a wheelchair)?: 3  Need to walk in hospital room?: 3  Climbing 3-5 steps with a railing?: 2  Basic Mobility Total Score: 18    Patient was left sitting up in bedside chair with all lines intact and call button in reach.    Clinical Decision Making for Evaluation Complexity:  1. Body System(s) Examination: 3  2. Clinical Presentation: Evolving  3. Evaluation Complexity: Moderate    GOALS:   Multidisciplinary Problems     Physical Therapy Goals        Problem: Physical Therapy Goal    Goal Priority Disciplines Outcome Goal Variances Interventions   Physical Therapy Goal     PT, PT/OT      Description:  Goals to be met by: 19     Patient will increase functional independence with mobility by performin. Supine to sit with Stand-by Assistance within sternal precautions; HOB flat - Not met  2. Sit to supine with Stand-by Assistance within sternal precautions; HOB flat - Not met  3. Sit to stand transfer with  Mod (I) within sternal precautions - Not met  4. Bed to chair transfer with Supervision without device within sternal precautions - Not met  5. Gait  x 200 feet with Stand-by Assistance - Not met  6. Ascend/descend 4 stairs with right Handrail with Contact Guard Assistance - Not met  7. Lower extremity exercise program x 20 reps per handout, with independence - Not met                   Riccardo Brown, PT  9/2/2019

## 2019-09-02 NOTE — PLAN OF CARE
Problem: Adult Inpatient Plan of Care  Goal: Plan of Care Review  Outcome: Ongoing (interventions implemented as appropriate)  Plan of care reviewed with pt. Pt remains free of falls/injury. BP elevated -190s. MD notified. ordered Hydralazine 10mg IVP given.  CO pain in neck then HA, no denies any vision deficits and numbness or tingling in extremities. Pts gait improving, standby assist. Educated on sternal precaution compliance, states understanding, but does not follow. BSC done Q6H. PRN insulin given. Will continue to monitor.

## 2019-09-02 NOTE — HOSPITAL COURSE
75yo F post op from CABG 8/12 discharged 8/21 presenting with severe sepsis, DOREEN, and Ecoli bacteremia likely 2/2 to UTI. Completed abx course and doing well. Will discharge to SNF today, 9/9/19.

## 2019-09-02 NOTE — PLAN OF CARE
Problem: Adult Inpatient Plan of Care  Goal: Plan of Care Review  Outcome: Ongoing (interventions implemented as appropriate)  Plan of care discussed with patient.  Patient ambulating with 1 assist/standby, fall precautions in place. Continuing to encourage sternal precautions, IS, and ambulation. Pt is currently noncompliant with sternal precautions, education re-enforced and encouraged during ambulation and shifting positions. Patient has no complaints of pain. Discussed medications and care. Still receiving Zosyn q8h. Being diuresed with 20 mg IVP lasix BID. Electrolytes replaced as ordered. STAT head CT completed due to htn and neck pain, resulted as negative. Patient has no questions at this time. Will continue to monitor.

## 2019-09-02 NOTE — PROGRESS NOTES
[facility]  Cardiothoracic Surgery  Progress Note    Patient Name: Kaylee Romero  MRN: [id number]  Admission Date: 8/27/2019  Hospital Length of Stay: 5 days  Code Status: Full Code   Attending Physician: Peterson Ventura MD   Referring Provider: Self, Aaareferral  Principal Problem:<principal problem not specified>            Subjective:     Post-Op Info:  * No surgery found *         Interval History: started on flagyl for presumed cdiff; refractory htn; c/o pain at back of head    ROS  Medications:  Continuous Infusions:  Scheduled Meds:   aspirin  325 mg Oral Daily    atorvastatin  80 mg Oral Daily    famotidine  20 mg Oral Daily    furosemide  20 mg Intravenous BID    heparin (porcine)  5,000 Units Subcutaneous Q8H    isosorbide mononitrate  30 mg Oral Daily    losartan  50 mg Oral Daily    magnesium oxide  400 mg Oral BID    magnesium sulfate IVPB  2 g Intravenous Once    metoprolol tartrate  50 mg Oral BID    piperacillin-tazobactam (ZOSYN) IVPB  4.5 g Intravenous Q8H    polyethylene glycol  17 g Oral Daily    senna-docusate 8.6-50 mg  1 tablet Oral BID     PRN Meds:acetaminophen, bisacodyl, Dextrose 10% Bolus, glucagon (human recombinant), insulin aspart U-100, ondansetron, oxyCODONE, sodium chloride 0.9%     Objective:     Vital Signs (Most Recent):  Temp: 97.4 °F (36.3 °C) (09/02/19 1252)  Pulse: 78 (09/02/19 1252)  Resp: 20 (09/02/19 1252)  BP: (!) 159/73 (09/02/19 1252)  SpO2: (!) 94 % (09/02/19 1252) Vital Signs (24h Range):  Temp:  [97.4 °F (36.3 °C)-98.5 °F (36.9 °C)] 97.4 °F (36.3 °C)  Pulse:  [78-90] 78  Resp:  [18-20] 20  SpO2:  [94 %-99 %] 94 %  BP: (129-193)/(69-86) 159/73     Weight: 103.7 kg (228 lb 9.9 oz)  Body mass index is 38.04 kg/m².    SpO2: (!) 94 %  O2 Device (Oxygen Therapy): room air    Intake/Output - Last 3 Shifts       08/31 0700 - 09/01 0659 09/01 0700 - 09/02 0659 09/02 0700 - 09/03 0659    P.O. 750      IV Piggyback       Total Intake(mL/kg)  750 (6.9)      Urine (mL/kg/hr) 200 (0.1) 1600 (0.6) 300 (0.3)    Stool 0 0 0    Total Output 200 1600 300    Net +550 -1600 -300           Urine Occurrence 1 x 1 x     Stool Occurrence 5 x 4 x 1 x          Lines/Drains/Airways     Peripheral Intravenous Line                 Peripheral IV - Single Lumen 09/01/19 0540 20 G Left Forearm 1 day                Physical Exam  Incision: c/d/i;  Card: RRR    Significant Labs:  All pertinent labs from the last 24 hours have been reviewed.    Significant Diagnostics:  I have reviewed all pertinent imaging results/findings within the past 24 hours.    Assessment/Plan:     Sepsis  73yo F post op from CABG 8/12 discharged 8/21 presenting with severe sepsis, DOREEN, and Ecoli bacteremia likely 2/2 to UTI now c/o head pain     CT head to rule out acute intracranial process given elevated BP and headache  Given neg Cdiff PCR, will d/c flagyl  Tylenol and Oxycodone prn  , Atorvastatin 80  Metop 50 BID   Losartan to 50mg. Increase Imdur to 60 qd  On RA  Replete lytes K>4, Mg>2 --> starting scheduled mag daily  Cardiac diet as tolerated  Plan a 1 week total course of Zosyn (ending 9/5)    Morbid obesity with body mass index (BMI) of 40.0 or higher  PT/OT   Cardiac diet         Rom Cespedes MD  Cardiothoracic Surgery  [facility]

## 2019-09-02 NOTE — SUBJECTIVE & OBJECTIVE
Interval History: started on flagyl for presumed cdiff; refractory htn; c/o pain at back of head    ROS  Medications:  Continuous Infusions:  Scheduled Meds:   aspirin  325 mg Oral Daily    atorvastatin  80 mg Oral Daily    famotidine  20 mg Oral Daily    furosemide  20 mg Intravenous BID    heparin (porcine)  5,000 Units Subcutaneous Q8H    isosorbide mononitrate  30 mg Oral Daily    losartan  50 mg Oral Daily    magnesium oxide  400 mg Oral BID    magnesium sulfate IVPB  2 g Intravenous Once    metoprolol tartrate  50 mg Oral BID    piperacillin-tazobactam (ZOSYN) IVPB  4.5 g Intravenous Q8H    polyethylene glycol  17 g Oral Daily    senna-docusate 8.6-50 mg  1 tablet Oral BID     PRN Meds:acetaminophen, bisacodyl, Dextrose 10% Bolus, glucagon (human recombinant), insulin aspart U-100, ondansetron, oxyCODONE, sodium chloride 0.9%     Objective:     Vital Signs (Most Recent):  Temp: 97.4 °F (36.3 °C) (09/02/19 1252)  Pulse: 78 (09/02/19 1252)  Resp: 20 (09/02/19 1252)  BP: (!) 159/73 (09/02/19 1252)  SpO2: (!) 94 % (09/02/19 1252) Vital Signs (24h Range):  Temp:  [97.4 °F (36.3 °C)-98.5 °F (36.9 °C)] 97.4 °F (36.3 °C)  Pulse:  [78-90] 78  Resp:  [18-20] 20  SpO2:  [94 %-99 %] 94 %  BP: (129-193)/(69-86) 159/73     Weight: 103.7 kg (228 lb 9.9 oz)  Body mass index is 38.04 kg/m².    SpO2: (!) 94 %  O2 Device (Oxygen Therapy): room air    Intake/Output - Last 3 Shifts       08/31 0700 - 09/01 0659 09/01 0700 - 09/02 0659 09/02 0700 - 09/03 0659    P.O. 750      IV Piggyback       Total Intake(mL/kg) 750 (6.9)      Urine (mL/kg/hr) 200 (0.1) 1600 (0.6) 300 (0.3)    Stool 0 0 0    Total Output 200 1600 300    Net +550 -1600 -300           Urine Occurrence 1 x 1 x     Stool Occurrence 5 x 4 x 1 x          Lines/Drains/Airways     Peripheral Intravenous Line                 Peripheral IV - Single Lumen 09/01/19 0540 20 G Left Forearm 1 day                Physical Exam  Incision: c/d/i;  Card:  RRR    Significant Labs:  All pertinent labs from the last 24 hours have been reviewed.    Significant Diagnostics:  I have reviewed all pertinent imaging results/findings within the past 24 hours.

## 2019-09-03 PROBLEM — E83.39 HYPOPHOSPHATEMIA: Status: RESOLVED | Noted: 2019-08-14 | Resolved: 2019-09-03

## 2019-09-03 PROBLEM — A41.9 SEPSIS: Status: RESOLVED | Noted: 2019-08-29 | Resolved: 2019-09-03

## 2019-09-03 LAB
ANION GAP SERPL CALC-SCNC: 9 MMOL/L (ref 8–16)
BACTERIA BLD CULT: NORMAL
BASOPHILS # BLD AUTO: 0.02 K/UL (ref 0–0.2)
BASOPHILS NFR BLD: 0.3 % (ref 0–1.9)
BUN SERPL-MCNC: 8 MG/DL (ref 8–23)
CALCIUM SERPL-MCNC: 8.4 MG/DL (ref 8.7–10.5)
CHLORIDE SERPL-SCNC: 105 MMOL/L (ref 95–110)
CO2 SERPL-SCNC: 23 MMOL/L (ref 23–29)
CREAT SERPL-MCNC: 1.2 MG/DL (ref 0.5–1.4)
DIFFERENTIAL METHOD: ABNORMAL
EOSINOPHIL # BLD AUTO: 0.2 K/UL (ref 0–0.5)
EOSINOPHIL NFR BLD: 3.8 % (ref 0–8)
ERYTHROCYTE [DISTWIDTH] IN BLOOD BY AUTOMATED COUNT: 15.5 % (ref 11.5–14.5)
EST. GFR  (AFRICAN AMERICAN): 52.2 ML/MIN/1.73 M^2
EST. GFR  (NON AFRICAN AMERICAN): 45.3 ML/MIN/1.73 M^2
GLUCOSE SERPL-MCNC: 169 MG/DL (ref 70–110)
HCT VFR BLD AUTO: 23.2 % (ref 37–48.5)
HGB BLD-MCNC: 7.2 G/DL (ref 12–16)
IMM GRANULOCYTES # BLD AUTO: 0.18 K/UL (ref 0–0.04)
IMM GRANULOCYTES NFR BLD AUTO: 3.1 % (ref 0–0.5)
LYMPHOCYTES # BLD AUTO: 1.3 K/UL (ref 1–4.8)
LYMPHOCYTES NFR BLD: 22.8 % (ref 18–48)
MAGNESIUM SERPL-MCNC: 1.6 MG/DL (ref 1.6–2.6)
MCH RBC QN AUTO: 27.4 PG (ref 27–31)
MCHC RBC AUTO-ENTMCNC: 31 G/DL (ref 32–36)
MCV RBC AUTO: 88 FL (ref 82–98)
MONOCYTES # BLD AUTO: 0.7 K/UL (ref 0.3–1)
MONOCYTES NFR BLD: 11.4 % (ref 4–15)
NEUTROPHILS # BLD AUTO: 3.4 K/UL (ref 1.8–7.7)
NEUTROPHILS NFR BLD: 58.6 % (ref 38–73)
NRBC BLD-RTO: 0 /100 WBC
PHOSPHATE SERPL-MCNC: 2.7 MG/DL (ref 2.7–4.5)
PLATELET # BLD AUTO: 249 K/UL (ref 150–350)
PMV BLD AUTO: 10.4 FL (ref 9.2–12.9)
POCT GLUCOSE: 169 MG/DL (ref 70–110)
POCT GLUCOSE: 176 MG/DL (ref 70–110)
POCT GLUCOSE: 184 MG/DL (ref 70–110)
POCT GLUCOSE: 185 MG/DL (ref 70–110)
POCT GLUCOSE: 220 MG/DL (ref 70–110)
POTASSIUM SERPL-SCNC: 3.4 MMOL/L (ref 3.5–5.1)
RBC # BLD AUTO: 2.63 M/UL (ref 4–5.4)
SODIUM SERPL-SCNC: 137 MMOL/L (ref 136–145)
WBC # BLD AUTO: 5.79 K/UL (ref 3.9–12.7)

## 2019-09-03 PROCEDURE — 25000003 PHARM REV CODE 250: Performed by: STUDENT IN AN ORGANIZED HEALTH CARE EDUCATION/TRAINING PROGRAM

## 2019-09-03 PROCEDURE — 85025 COMPLETE CBC W/AUTO DIFF WBC: CPT

## 2019-09-03 PROCEDURE — 63600175 PHARM REV CODE 636 W HCPCS: Performed by: STUDENT IN AN ORGANIZED HEALTH CARE EDUCATION/TRAINING PROGRAM

## 2019-09-03 PROCEDURE — S5571 INSULIN DISPOS PEN 3 ML: HCPCS | Performed by: NURSE PRACTITIONER

## 2019-09-03 PROCEDURE — 84100 ASSAY OF PHOSPHORUS: CPT

## 2019-09-03 PROCEDURE — 83735 ASSAY OF MAGNESIUM: CPT

## 2019-09-03 PROCEDURE — 20600001 HC STEP DOWN PRIVATE ROOM

## 2019-09-03 PROCEDURE — 99222 1ST HOSP IP/OBS MODERATE 55: CPT | Mod: ,,, | Performed by: NURSE PRACTITIONER

## 2019-09-03 PROCEDURE — 99222 PR INITIAL HOSPITAL CARE,LEVL II: ICD-10-PCS | Mod: ,,, | Performed by: NURSE PRACTITIONER

## 2019-09-03 PROCEDURE — 36415 COLL VENOUS BLD VENIPUNCTURE: CPT

## 2019-09-03 PROCEDURE — 25000003 PHARM REV CODE 250: Performed by: PHYSICIAN ASSISTANT

## 2019-09-03 PROCEDURE — 25000003 PHARM REV CODE 250: Performed by: NURSE PRACTITIONER

## 2019-09-03 PROCEDURE — 25000003 PHARM REV CODE 250: Performed by: SURGERY

## 2019-09-03 PROCEDURE — 63600175 PHARM REV CODE 636 W HCPCS: Performed by: NURSE PRACTITIONER

## 2019-09-03 PROCEDURE — 80048 BASIC METABOLIC PNL TOTAL CA: CPT

## 2019-09-03 PROCEDURE — 97535 SELF CARE MNGMENT TRAINING: CPT

## 2019-09-03 RX ORDER — GLUCAGON 1 MG
1 KIT INJECTION
Status: DISCONTINUED | OUTPATIENT
Start: 2019-09-03 | End: 2019-09-09 | Stop reason: HOSPADM

## 2019-09-03 RX ORDER — ISOSORBIDE MONONITRATE 60 MG/1
60 TABLET, EXTENDED RELEASE ORAL DAILY
Status: DISCONTINUED | OUTPATIENT
Start: 2019-09-04 | End: 2019-09-04

## 2019-09-03 RX ORDER — ISOSORBIDE MONONITRATE 30 MG/1
30 TABLET, EXTENDED RELEASE ORAL ONCE
Status: COMPLETED | OUTPATIENT
Start: 2019-09-03 | End: 2019-09-03

## 2019-09-03 RX ORDER — POTASSIUM CHLORIDE 20 MEQ/1
40 TABLET, EXTENDED RELEASE ORAL 2 TIMES DAILY
Status: DISCONTINUED | OUTPATIENT
Start: 2019-09-03 | End: 2019-09-05

## 2019-09-03 RX ORDER — IBUPROFEN 200 MG
24 TABLET ORAL
Status: DISCONTINUED | OUTPATIENT
Start: 2019-09-03 | End: 2019-09-09 | Stop reason: HOSPADM

## 2019-09-03 RX ORDER — INSULIN ASPART 100 [IU]/ML
6 INJECTION, SOLUTION INTRAVENOUS; SUBCUTANEOUS
Status: DISCONTINUED | OUTPATIENT
Start: 2019-09-03 | End: 2019-09-03

## 2019-09-03 RX ORDER — INSULIN ASPART 100 [IU]/ML
0-5 INJECTION, SOLUTION INTRAVENOUS; SUBCUTANEOUS
Status: DISCONTINUED | OUTPATIENT
Start: 2019-09-03 | End: 2019-09-09 | Stop reason: HOSPADM

## 2019-09-03 RX ORDER — LANOLIN ALCOHOL/MO/W.PET/CERES
800 CREAM (GRAM) TOPICAL 2 TIMES DAILY
Status: DISCONTINUED | OUTPATIENT
Start: 2019-09-03 | End: 2019-09-07

## 2019-09-03 RX ORDER — IBUPROFEN 200 MG
16 TABLET ORAL
Status: DISCONTINUED | OUTPATIENT
Start: 2019-09-03 | End: 2019-09-09 | Stop reason: HOSPADM

## 2019-09-03 RX ORDER — HYDRALAZINE HYDROCHLORIDE 20 MG/ML
10 INJECTION INTRAMUSCULAR; INTRAVENOUS ONCE
Status: COMPLETED | OUTPATIENT
Start: 2019-09-03 | End: 2019-09-03

## 2019-09-03 RX ORDER — INSULIN ASPART 100 [IU]/ML
8 INJECTION, SOLUTION INTRAVENOUS; SUBCUTANEOUS
Status: DISCONTINUED | OUTPATIENT
Start: 2019-09-03 | End: 2019-09-04

## 2019-09-03 RX ADMIN — INSULIN ASPART 6 UNITS: 100 INJECTION, SOLUTION INTRAVENOUS; SUBCUTANEOUS at 12:09

## 2019-09-03 RX ADMIN — INSULIN ASPART 8 UNITS: 100 INJECTION, SOLUTION INTRAVENOUS; SUBCUTANEOUS at 04:09

## 2019-09-03 RX ADMIN — HEPARIN SODIUM 5000 UNITS: 5000 INJECTION, SOLUTION INTRAVENOUS; SUBCUTANEOUS at 04:09

## 2019-09-03 RX ADMIN — ISOSORBIDE MONONITRATE 60 MG: 60 TABLET, EXTENDED RELEASE ORAL at 08:09

## 2019-09-03 RX ADMIN — ASPIRIN 325 MG: 325 TABLET, DELAYED RELEASE ORAL at 08:09

## 2019-09-03 RX ADMIN — ISOSORBIDE MONONITRATE 30 MG: 30 TABLET, EXTENDED RELEASE ORAL at 02:09

## 2019-09-03 RX ADMIN — PIPERACILLIN AND TAZOBACTAM 4.5 G: 4; .5 INJECTION, POWDER, LYOPHILIZED, FOR SOLUTION INTRAVENOUS; PARENTERAL at 08:09

## 2019-09-03 RX ADMIN — METOPROLOL TARTRATE 50 MG: 50 TABLET ORAL at 08:09

## 2019-09-03 RX ADMIN — ACETAMINOPHEN 650 MG: 325 TABLET ORAL at 03:09

## 2019-09-03 RX ADMIN — HEPARIN SODIUM 5000 UNITS: 5000 INJECTION, SOLUTION INTRAVENOUS; SUBCUTANEOUS at 02:09

## 2019-09-03 RX ADMIN — MAGNESIUM OXIDE TAB 400 MG (241.3 MG ELEMENTAL MG) 800 MG: 400 (241.3 MG) TAB at 08:09

## 2019-09-03 RX ADMIN — PIPERACILLIN AND TAZOBACTAM 4.5 G: 4; .5 INJECTION, POWDER, LYOPHILIZED, FOR SOLUTION INTRAVENOUS; PARENTERAL at 03:09

## 2019-09-03 RX ADMIN — INSULIN DETEMIR 10 UNITS: 100 INJECTION, SOLUTION SUBCUTANEOUS at 08:09

## 2019-09-03 RX ADMIN — FUROSEMIDE 20 MG: 10 INJECTION, SOLUTION INTRAMUSCULAR; INTRAVENOUS at 05:09

## 2019-09-03 RX ADMIN — PIPERACILLIN AND TAZOBACTAM 4.5 G: 4; .5 INJECTION, POWDER, LYOPHILIZED, FOR SOLUTION INTRAVENOUS; PARENTERAL at 04:09

## 2019-09-03 RX ADMIN — FAMOTIDINE 20 MG: 20 TABLET, FILM COATED ORAL at 08:09

## 2019-09-03 RX ADMIN — POTASSIUM CHLORIDE 40 MEQ: 20 TABLET, EXTENDED RELEASE ORAL at 08:09

## 2019-09-03 RX ADMIN — FUROSEMIDE 20 MG: 10 INJECTION, SOLUTION INTRAMUSCULAR; INTRAVENOUS at 08:09

## 2019-09-03 RX ADMIN — INSULIN ASPART 6 UNITS: 100 INJECTION, SOLUTION INTRAVENOUS; SUBCUTANEOUS at 08:09

## 2019-09-03 RX ADMIN — LOSARTAN POTASSIUM 50 MG: 50 TABLET, FILM COATED ORAL at 08:09

## 2019-09-03 RX ADMIN — ONDANSETRON 8 MG: 4 TABLET, ORALLY DISINTEGRATING ORAL at 11:09

## 2019-09-03 RX ADMIN — HEPARIN SODIUM 5000 UNITS: 5000 INJECTION, SOLUTION INTRAVENOUS; SUBCUTANEOUS at 08:09

## 2019-09-03 RX ADMIN — OXYCODONE HYDROCHLORIDE 5 MG: 5 TABLET ORAL at 05:09

## 2019-09-03 RX ADMIN — ATORVASTATIN CALCIUM 80 MG: 20 TABLET, FILM COATED ORAL at 08:09

## 2019-09-03 RX ADMIN — HYDRALAZINE HYDROCHLORIDE 10 MG: 20 INJECTION INTRAMUSCULAR; INTRAVENOUS at 04:09

## 2019-09-03 NOTE — SUBJECTIVE & OBJECTIVE
Interval HPI:   Overnight events: Remains in CTSU, hypertension and nausea noted.  BG consistently elevated yesterday and overnight.    Eatin% - liquid diet  Nausea: Yes  Hypoglycemia and intervention: No  Fever: No  TPN and/or TF: No    PMH, PSH, FH, SH reviewed     Review of Systems    Constitutional: Negative for weight changes.  Eyes: Negative for visual disturbance, wears glasses.  Respiratory: Positive for cough.   Cardiovascular: Negative for chest pain.  Gastrointestinal: Positive for nausea.  Endocrine: Negative for polyuria, polydipsia.  Musculoskeletal: Negative for back pain.  Skin: Negative for rash.  Bruising bilateral eyes.  Neurological: Negative for syncope.  Psychiatric/Behavioral: Negative for depression.    Current Medications and/or Treatments Impacting Glycemic Control  Immunotherapy:    Immunosuppressants     None        Steroids:   Hormones (From admission, onward)    None        Pressors:    Autonomic Drugs (From admission, onward)    None        Hyperglycemia/Diabetes Medications:   Antihyperglycemics (From admission, onward)    Start     Stop Route Frequency Ordered    19 0900  insulin detemir U-100 pen 10 Units      -- SubQ Daily 19 0741    19 0841  insulin aspart U-100 pen 0-5 Units      -- SubQ Before meals & nightly PRN 19 0741    19 0745  insulin aspart U-100 pen 6 Units      -- SubQ 3 times daily with meals 19 0741             PHYSICAL EXAMINATION:  Vitals:    19 1235   BP: 127/74   Pulse: 78   Resp: 18   Temp: 97.4 °F (36.3 °C)     Body mass index is 37.49 kg/m².    Physical Exam     Constitutional: Well developed, well nourished, NAD.  ENT: External ears no masses with nose patent; normal hearing.  Neck: Supple; trachea midline.  Cardiovascular: Normal heart sounds, 2+ LE edema. DP +1 bilaterally.  Lungs: Normal effort; lungs anterior bilaterally clear to auscultation.  Abdomen: Soft, no masses, no hernias.  MS: No clubbing or  cyanosis of nails noted; unable to assess gait.  Skin: No rashes, lesions, or ulcers; no nodules. Lipo hypertropthy noted to bilateral posterior upper extremities.  Healing incision to midline chest.  Healing incision to LLE.  Psychiatric: Good judgement and insight; normal mood and affect.  Neurological: Cranial nerves are grossly intact.  Foot: Nails in fair condition, no amputations noted.

## 2019-09-03 NOTE — PLAN OF CARE
Rec for discharge is Skilled Nursing.  Referral sent to [facility].  Planned discharge is Skilled - Plan (A) or home with family with home health - Plan (B).     09/03/19 1608   Discharge Reassessment   Assessment Type Discharge Planning Reassessment   Provided patient/caregiver education on the expected discharge date and the discharge plan No   Do you have any problems affording any of your prescribed medications? No   Discharge Plan A Skilled Nursing Facility   Discharge Plan B Home with family;Home Health   DME Needed Upon Discharge  none   Patient choice form signed by patient/caregiver N/A   Anticipated Discharge Disposition SNF   Can the patient answer the patient profile reliably? Yes, cognitively intact   How does the patient rate their overall health at the present time? Poor   Describe the patient's ability to walk at the present time. Minor restrictions or changes   How often would a person be available to care for the patient? Whenever needed   Number of comorbid conditions (as recorded on the chart) Two   During the past month, has the patient often been bothered by feeling down, depressed or hopeless? No   During the past month, has the patient often been bothered by little interest or pleasure in doing things? No

## 2019-09-03 NOTE — PLAN OF CARE
09/03/19 1118   Post-Acute Status   Post-Acute Authorization Placement   Post-Acute Placement Status Referrals Sent     SW met with the pt to discuss SNF recommendation. SW provided her with a list.  She picked [facility].  SW request that she pick additional choices and the SW will f/u with her later.  SW sent a referral to [facility] and asked that a consult be places.  Pt can transfer once approved.    Mar [place], [facility] x [phone]

## 2019-09-03 NOTE — HPI
74-year-old female to the ER for evaluation generalized malaise, fatigue, weakness, decreased p.o. appetite, multiple falls.  EMS contacted by the patient's .  The patient had a CABG 2 weeks ago performed here with Dr. Ventura.  Since the CABG she has had decreased p.o. intake nausea vomiting and weakness.  Yesterday the patient had a mechanical slip and fall secondary to not feeling well and feeling weak, she fell and hit her face, there was no LOC.   She fell out of the bed again and hit her face again, she also complains of pain to both shoulders.  She presents to the ER febrile, tachycardic, tachypneic, and hypoxic. EMS as the patient on 2 L nasal cannula with oxygenation around of 90s.  On room air her oxygenation was low 80s.

## 2019-09-03 NOTE — PLAN OF CARE
Problem: Adult Inpatient Plan of Care  Goal: Patient-Specific Goal (Individualization)  Dx:  Sepsis  Hx:  CABG x3, DM2, KAMRAN, HLD, CKD3, TIA, osteoporosis, Hypertensive heart disease c diastolic HF    8/28:  Admit to SICU from ED.  2 L LR.  Gram negative anaerobic rods on blood cultures.  SILVA and US of LLE.      Nursing:  Accuchecks q6h   Outcome: Ongoing (interventions implemented as appropriate)  Patient remains free from falls and injuries through out shift. Patient AAOx4. Patient denies chest pain and SOB. BP elevated this morning -170's and MD notified. Hydralazine 10mg IVp x1 to be given. IVPB Zosyn given q8hr on shift. Patient is blood glucose checks ACHS and 0200, CBG on shift 235;185. PRN SSI given per MAR orders. Per patient she has had multiple episodes of loose stools throughout the day. Lasix 20mg PO given BID. Electrolyte replacement given, will f/u with morning labs. Contact precautions maintained. Sternal precautions teaching reinforced. Call bell is in reach of patient and patient instructed to use call bell prior to ambulating. Plan of care reviewed with patient. Patient verbalizes understanding of plan.  Will continue to monitor.

## 2019-09-03 NOTE — SUBJECTIVE & OBJECTIVE
Interval History: NAEON. Pending SNF placement. Pt encouraged to walk with PT in hallways    Review of Systems   Constitution: Negative for malaise/fatigue.   Cardiovascular: Negative for chest pain, claudication, dyspnea on exertion, irregular heartbeat, leg swelling and palpitations.   Respiratory: Negative for cough and shortness of breath.    Hematologic/Lymphatic: Negative for bleeding problem.   Gastrointestinal: Negative for abdominal pain.   Genitourinary: Negative for dysuria.   Neurological: Negative for headaches and weakness.     Medications:  Continuous Infusions:  Scheduled Meds:   aspirin  325 mg Oral Daily    atorvastatin  80 mg Oral Daily    famotidine  20 mg Oral Daily    furosemide  20 mg Intravenous BID    heparin (porcine)  5,000 Units Subcutaneous Q8H    insulin aspart U-100  6 Units Subcutaneous TIDWM    insulin detemir U-100  10 Units Subcutaneous Daily    isosorbide mononitrate  30 mg Oral Once    [START ON 9/4/2019] isosorbide mononitrate  90 mg Oral Daily    losartan  50 mg Oral Daily    magnesium oxide  800 mg Oral BID    metoprolol tartrate  50 mg Oral BID    piperacillin-tazobactam (ZOSYN) IVPB  4.5 g Intravenous Q8H    polyethylene glycol  17 g Oral Daily    potassium chloride  40 mEq Oral BID    senna-docusate 8.6-50 mg  1 tablet Oral BID     PRN Meds:acetaminophen, bisacodyl, Dextrose 10% Bolus, Dextrose 10% Bolus, Dextrose 10% Bolus, glucagon (human recombinant), glucose, glucose, insulin aspart U-100, ondansetron, oxyCODONE, sodium chloride 0.9%     Objective:     Vital Signs (Most Recent):  Temp: 97.4 °F (36.3 °C) (09/03/19 1235)  Pulse: 78 (09/03/19 1235)  Resp: 18 (09/03/19 1235)  BP: 127/74 (09/03/19 1235)  SpO2: 98 % (09/03/19 1235) Vital Signs (24h Range):  Temp:  [97.4 °F (36.3 °C)-98.6 °F (37 °C)] 97.4 °F (36.3 °C)  Pulse:  [] 78  Resp:  [18] 18  SpO2:  [91 %-98 %] 98 %  BP: (127-180)/(72-90) 127/74     Weight: 102.2 kg (225 lb 5 oz)  Body mass index  is 37.49 kg/m².    SpO2: 98 %  O2 Device (Oxygen Therapy): room air    Intake/Output - Last 3 Shifts       09/01 0700 - 09/02 0659 09/02 0700 - 09/03 0659 09/03 0700 - 09/04 0659    P.O.  1200     Total Intake(mL/kg)  1200 (11.7)     Urine (mL/kg/hr) 1600 (0.6) 1250 (0.5)     Stool 0 0     Total Output 1600 1250     Net -1600 -50            Urine Occurrence 1 x 1 x     Stool Occurrence 4 x 1 x           Lines/Drains/Airways     Peripheral Intravenous Line                 Peripheral IV - Single Lumen 09/01/19 0540 20 G Left Forearm 2 days                Physical Exam   Constitutional: She is oriented to person, place, and time. She appears well-developed and well-nourished.   Cardiovascular: Normal rate, regular rhythm and normal heart sounds.   Pulmonary/Chest: Effort normal and breath sounds normal.   Abdominal: Soft.   Musculoskeletal: Normal range of motion. She exhibits edema.   Neurological: She is alert and oriented to person, place, and time.   Skin: Skin is warm, dry and intact.   Psychiatric: She has a normal mood and affect.       Significant Labs:  BMP:   Recent Labs   Lab 09/03/19  0400   *      K 3.4*      CO2 23   BUN 8   CREATININE 1.2   CALCIUM 8.4*   MG 1.6     CBC:   Recent Labs   Lab 09/03/19  0400   WBC 5.79   RBC 2.63*   HGB 7.2*   HCT 23.2*      MCV 88   MCH 27.4   MCHC 31.0*       Significant Diagnostics:  I have reviewed all pertinent imaging results/findings within the past 24 hours.

## 2019-09-03 NOTE — ASSESSMENT & PLAN NOTE
Titrate insulin slowly to avoid hypoglycemia as the risk of hypoglycemia increases with decreased creatinine clearance.  Caution with insulin stacking  Estimated Creatinine Clearance: 50.2 mL/min (based on SCr of 1.2 mg/dL).

## 2019-09-03 NOTE — ASSESSMENT & PLAN NOTE
BG goal 140 - 180     Start Levemir 10 units daily in AM  Novolog 6 units with meals  Low dose correction scale  BG monitoring AC/HS    ADDENDUM: Increase Novolog to 8 units with meals    Discharge planning: TBD

## 2019-09-03 NOTE — HPI
Reason for Consult: Management of T2DM, Hyperglycemia      Surgical Procedure and Date: CABG 8/12/19     Diabetes diagnosis year: 2013     Lab Results   Component Value Date    HGBA1C 7.5 (H) 08/07/2019       Home Diabetes Medications: Levemir 8 q HS, Glipizide 20 mg daily, Trulicity 1.5 mg SQ once weekly     How often checking glucose at home? About once daily   BG readings on regimen: 160-300  Hypoglycemia on the regimen?  no  Missed doses on regimen? no     Diabetes Complications include:     Hyperglycemia and Diabetic chronic kidney disease         Complicating diabetes co morbidities:   History of MI, KAMRAN and CKD        HPI:   Patient is a 74 y.o. female with a diagnosis of DM2, KAMRAN, CKD, and CAD, who is s/p CABG on 8/12/19. Last seen in [facility] [place] endocrinology clinic for DM by MAURICIO Pritchard NP on 5/23/19.  Positive family history of DM (father).  Endocrinology consulted for DM/BG management.

## 2019-09-03 NOTE — PROGRESS NOTES
09/03/19 0409 09/03/19 0413   Vital Signs   BP (!) 163/87 (!) 165/82   MAP (mmHg) 115 117   BP Location Right arm Right arm   BP Method Automatic Automatic   Patient Position Lying Lying     Notified MD Joni of patient above BP. Patient is having c/o HA. MD gave orders to give one time dose of IVP Hydralazine 10mg. Will cont to monitor patient.

## 2019-09-03 NOTE — PROGRESS NOTES
[facility]  Cardiothoracic Surgery  Progress Note    Patient Name: Kaylee Romero  MRN: [id number]  Admission Date: 8/27/2019  Hospital Length of Stay: 6 days  Code Status: Full Code   Attending Physician: Peterson Ventura MD   Referring Provider: Self, Aaareferral  Principal Problem:<principal problem not specified>      Subjective:     Post-Op Info:  * No surgery found *         Interval History: NAEON. Pending SNF placement. Pt encouraged to walk with PT in hallways    Review of Systems   Constitution: Negative for malaise/fatigue.   Cardiovascular: Negative for chest pain, claudication, dyspnea on exertion, irregular heartbeat, leg swelling and palpitations.   Respiratory: Negative for cough and shortness of breath.    Hematologic/Lymphatic: Negative for bleeding problem.   Gastrointestinal: Negative for abdominal pain.   Genitourinary: Negative for dysuria.   Neurological: Negative for headaches and weakness.     Medications:  Continuous Infusions:  Scheduled Meds:   aspirin  325 mg Oral Daily    atorvastatin  80 mg Oral Daily    famotidine  20 mg Oral Daily    furosemide  20 mg Intravenous BID    heparin (porcine)  5,000 Units Subcutaneous Q8H    insulin aspart U-100  6 Units Subcutaneous TIDWM    insulin detemir U-100  10 Units Subcutaneous Daily    isosorbide mononitrate  30 mg Oral Once    [START ON 9/4/2019] isosorbide mononitrate  90 mg Oral Daily    losartan  50 mg Oral Daily    magnesium oxide  800 mg Oral BID    metoprolol tartrate  50 mg Oral BID    piperacillin-tazobactam (ZOSYN) IVPB  4.5 g Intravenous Q8H    polyethylene glycol  17 g Oral Daily    potassium chloride  40 mEq Oral BID    senna-docusate 8.6-50 mg  1 tablet Oral BID     PRN Meds:acetaminophen, bisacodyl, Dextrose 10% Bolus, Dextrose 10% Bolus, Dextrose 10% Bolus, glucagon (human recombinant), glucose, glucose, insulin aspart U-100, ondansetron, oxyCODONE, sodium chloride 0.9%     Objective:      Vital Signs (Most Recent):  Temp: 97.4 °F (36.3 °C) (09/03/19 1235)  Pulse: 78 (09/03/19 1235)  Resp: 18 (09/03/19 1235)  BP: 127/74 (09/03/19 1235)  SpO2: 98 % (09/03/19 1235) Vital Signs (24h Range):  Temp:  [97.4 °F (36.3 °C)-98.6 °F (37 °C)] 97.4 °F (36.3 °C)  Pulse:  [] 78  Resp:  [18] 18  SpO2:  [91 %-98 %] 98 %  BP: (127-180)/(72-90) 127/74     Weight: 102.2 kg (225 lb 5 oz)  Body mass index is 37.49 kg/m².    SpO2: 98 %  O2 Device (Oxygen Therapy): room air    Intake/Output - Last 3 Shifts       09/01 0700 - 09/02 0659 09/02 0700 - 09/03 0659 09/03 0700 - 09/04 0659    P.O.  1200     Total Intake(mL/kg)  1200 (11.7)     Urine (mL/kg/hr) 1600 (0.6) 1250 (0.5)     Stool 0 0     Total Output 1600 1250     Net -1600 -50            Urine Occurrence 1 x 1 x     Stool Occurrence 4 x 1 x           Lines/Drains/Airways     Peripheral Intravenous Line                 Peripheral IV - Single Lumen 09/01/19 0540 20 G Left Forearm 2 days                Physical Exam   Constitutional: She is oriented to person, place, and time. She appears well-developed and well-nourished.   Cardiovascular: Normal rate, regular rhythm and normal heart sounds.   Pulmonary/Chest: Effort normal and breath sounds normal.   Abdominal: Soft.   Musculoskeletal: Normal range of motion. She exhibits edema.   Neurological: She is alert and oriented to person, place, and time.   Skin: Skin is warm, dry and intact.   Psychiatric: She has a normal mood and affect.       Significant Labs:  BMP:   Recent Labs   Lab 09/03/19  0400   *      K 3.4*      CO2 23   BUN 8   CREATININE 1.2   CALCIUM 8.4*   MG 1.6     CBC:   Recent Labs   Lab 09/03/19  0400   WBC 5.79   RBC 2.63*   HGB 7.2*   HCT 23.2*      MCV 88   MCH 27.4   MCHC 31.0*       Significant Diagnostics:  I have reviewed all pertinent imaging results/findings within the past 24 hours.    Assessment/Plan:     S/P CABG (coronary artery bypass graft)  Sternal  Precautions  PT/OT  Ambulate  ASA  Metop    Acute blood loss anemia  Expected ABLA   CBC daily     Physical deconditioning  PT/OT   Planning for SNF placement     CKD (chronic kidney disease) stage 3, GFR 30-59 ml/min  DOREEN on CKD 3 s/p volume resuscitation  Strict I/O --> will need to assess UOP  qAM BMP  Creatinine 1.2 stable  Lasix 20IV BID  Imdur 60mg daily      Morbid obesity with body mass index (BMI) of 40.0 or higher  PT/OT   Cardiac diet         Keila Warner NP  Cardiothoracic Surgery  [facility]

## 2019-09-03 NOTE — PLAN OF CARE
"Problem: Adult Inpatient Plan of Care  Goal: Plan of Care Review  Outcome: Ongoing (interventions implemented as appropriate)  Pt needs reinforcement with maintaining sternal precautions and education about accurate glucose monitoring. Ondasteron sublingual 2 tabs was given at 1156 for N/V. No complaints of pain, no SOB, no falls. Will continue to monitor BP an MAPS. Dr. Peterson Ventura said to give the pt some rest and "not take vitals from 11pm to morning shift."      "

## 2019-09-03 NOTE — CONSULTS
[facility]  Endocrinology  Diabetes Consult Note    Consult Requested by: Peterson Ventura MD   Reason for admit: <principal problem not specified>    HISTORY OF PRESENT ILLNESS:  Reason for Consult: Management of T2DM, Hyperglycemia      Surgical Procedure and Date: CABG 19     Diabetes diagnosis year:      Lab Results   Component Value Date    HGBA1C 7.5 (H) 2019       Home Diabetes Medications: Levemir 8 q HS, Glipizide 20 mg daily, Trulicity 1.5 mg SQ once weekly     How often checking glucose at home? About once daily   BG readings on regimen: 160-300  Hypoglycemia on the regimen?  no  Missed doses on regimen? no     Diabetes Complications include:     Hyperglycemia and Diabetic chronic kidney disease         Complicating diabetes co morbidities:   History of MI, KAMRAN and CKD        HPI:   Patient is a 74 y.o. female with a diagnosis of DM2, KAMRAN, CKD, and CAD, who is s/p CABG on 19. Last seen in [facility] [place] endocrinology clinic for DM by MAURICIO Pritchard NP on 19.  Positive family history of DM (father).  Endocrinology consulted for DM/BG management.          Interval HPI:   Overnight events: Remains in CTSU, hypertension and nausea noted.  BG consistently elevated yesterday and overnight.    Eatin% - liquid diet  Nausea: Yes  Hypoglycemia and intervention: No  Fever: No  TPN and/or TF: No    PMH, PSH, FH, SH reviewed     Review of Systems    Constitutional: Negative for weight changes.  Eyes: Negative for visual disturbance, wears glasses.  Respiratory: Positive for cough.   Cardiovascular: Negative for chest pain.  Gastrointestinal: Positive for nausea.  Endocrine: Negative for polyuria, polydipsia.  Musculoskeletal: Negative for back pain.  Skin: Negative for rash.  Bruising bilateral eyes.  Neurological: Negative for syncope.  Psychiatric/Behavioral: Negative for depression.    Current Medications and/or Treatments Impacting Glycemic Control  Immunotherapy:     Immunosuppressants     None        Steroids:   Hormones (From admission, onward)    None        Pressors:    Autonomic Drugs (From admission, onward)    None        Hyperglycemia/Diabetes Medications:   Antihyperglycemics (From admission, onward)    Start     Stop Route Frequency Ordered    09/03/19 0900  insulin detemir U-100 pen 10 Units      -- SubQ Daily 09/03/19 0741    09/03/19 0841  insulin aspart U-100 pen 0-5 Units      -- SubQ Before meals & nightly PRN 09/03/19 0741    09/03/19 0745  insulin aspart U-100 pen 6 Units      -- SubQ 3 times daily with meals 09/03/19 0741             PHYSICAL EXAMINATION:  Vitals:    09/03/19 1235   BP: 127/74   Pulse: 78   Resp: 18   Temp: 97.4 °F (36.3 °C)     Body mass index is 37.49 kg/m².    Physical Exam     Constitutional: Well developed, well nourished, NAD.  ENT: External ears no masses with nose patent; normal hearing.  Neck: Supple; trachea midline.  Cardiovascular: Normal heart sounds, 2+ LE edema. DP +1 bilaterally.  Lungs: Normal effort; lungs anterior bilaterally clear to auscultation.  Abdomen: Soft, no masses, no hernias.  MS: No clubbing or cyanosis of nails noted; unable to assess gait.  Skin: No rashes, lesions, or ulcers; no nodules. Lipo hypertropthy noted to bilateral posterior upper extremities.  Healing incision to midline chest.  Healing incision to LLE.  Psychiatric: Good judgement and insight; normal mood and affect.  Neurological: Cranial nerves are grossly intact.  Foot: Nails in fair condition, no amputations noted.      Labs Reviewed and Include   Recent Labs   Lab 09/03/19  0400   *   CALCIUM 8.4*      K 3.4*   CO2 23      BUN 8   CREATININE 1.2     Lab Results   Component Value Date    WBC 5.79 09/03/2019    HGB 7.2 (L) 09/03/2019    HCT 23.2 (L) 09/03/2019    MCV 88 09/03/2019     09/03/2019     No results for input(s): TSH, FREET4 in the last 168 hours.  Lab Results   Component Value Date    HGBA1C 7.5 (H)  08/07/2019       Nutritional status:   Body mass index is 37.49 kg/m².  Lab Results   Component Value Date    ALBUMIN 2.5 (L) 08/28/2019    ALBUMIN 2.8 (L) 08/28/2019    ALBUMIN 3.3 (L) 08/07/2019     No results found for: PREALBUMIN    Estimated Creatinine Clearance: 50.2 mL/min (based on SCr of 1.2 mg/dL).    Accu-Checks  Recent Labs     09/01/19  1219 09/01/19  2018 09/02/19  0559 09/02/19  1255 09/02/19  1645 09/02/19  2042 09/02/19  2323 09/03/19  0404 09/03/19  0739 09/03/19  1155   POCTGLUCOSE 243* 249* 201* 220* 258* 290* 253* 185* 176* 220*        ASSESSMENT and PLAN    Type 2 diabetes mellitus with stage 3 chronic kidney disease, without long-term current use of insulin  BG goal 140 - 180     Start Levemir 10 units daily in AM  Novolog 6 units with meals  Low dose correction scale  BG monitoring AC/HS    ADDENDUM: Increase Novolog to 8 units with meals    Discharge planning: TBD        S/P CABG (coronary artery bypass graft)  Managed per primary team  Avoid hypoglycemia  Optimize BG control for surgical wound healing        Sepsis-resolved as of 9/3/2019  Infection may elevate BG readings      CKD (chronic kidney disease) stage 3, GFR 30-59 ml/min  Titrate insulin slowly to avoid hypoglycemia as the risk of hypoglycemia increases with decreased creatinine clearance.  Caution with insulin stacking  Estimated Creatinine Clearance: 50.2 mL/min (based on SCr of 1.2 mg/dL).        Morbid obesity with body mass index (BMI) of 40.0 or higher  may contribute to insulin resistance  Body mass index is 37.49 kg/m².            Plan discussed with patient, family, and RN at bedside.     Mikey Pritchard NP  Endocrinology  [facility]

## 2019-09-03 NOTE — PLAN OF CARE
Problem: Occupational Therapy Goal  Goal: Occupational Therapy Goal  Goals to be met by: 10/1/2019    Patient will increase functional independence with ADLs by performing:    Using sock aid for donning, and dressing stick for doffing socks LE Dressing with Set-up Assistance.  MET 9/3 POWER  Grooming while standing at sink with Set-up Assistance.   Toileting from toilet with Modified [place] and Assistive Devices as needed for hygiene and clothing management.   Bathing from  standing at sink with Modified [place].  Supine to sit with Set-up Assistance while observing sternal precautions.  Toilet transfer to toilet with Modified [place]. MET 9/3 POWER     Outcome: Ongoing (interventions implemented as appropriate)  Pt goals remain appropriate, continue w/ OT POC.

## 2019-09-03 NOTE — PROGRESS NOTES
Last 5 Patient Entered Readings                                      Current 30 Day Average: 127/74     Recent Readings 8/22/2019 8/11/2019 8/10/2019 8/6/2019 7/29/2019    SBP (mmHg) 109 129 136 135 138    DBP (mmHg) 59 84 98 55 94    Pulse 84 93 93 90 84          Last 6 Patient Entered Readings                                          Most Recent A1c: 7.5% on 8/7/2019  (Goal: 8%)     Recent Readings 8/26/2019 8/25/2019 8/25/2019 8/24/2019 8/23/2019    Blood Glucose (mg/dL) 230 262 201 295 354          9/3: Patient currently admitted.  Will push call 2 weeks.

## 2019-09-04 ENCOUNTER — ANESTHESIA EVENT (OUTPATIENT)
Dept: CARDIOLOGY | Facility: HOSPITAL | Age: 74
DRG: 872 | End: 2019-09-04
Payer: MEDICARE

## 2019-09-04 ENCOUNTER — ANESTHESIA (OUTPATIENT)
Dept: CARDIOLOGY | Facility: HOSPITAL | Age: 74
DRG: 872 | End: 2019-09-04
Payer: MEDICARE

## 2019-09-04 LAB
ANION GAP SERPL CALC-SCNC: 8 MMOL/L (ref 8–16)
BASOPHILS # BLD AUTO: 0.03 K/UL (ref 0–0.2)
BASOPHILS NFR BLD: 0.5 % (ref 0–1.9)
BUN SERPL-MCNC: 7 MG/DL (ref 8–23)
CALCIUM SERPL-MCNC: 8.7 MG/DL (ref 8.7–10.5)
CHLORIDE SERPL-SCNC: 106 MMOL/L (ref 95–110)
CO2 SERPL-SCNC: 24 MMOL/L (ref 23–29)
CREAT SERPL-MCNC: 1.3 MG/DL (ref 0.5–1.4)
DIFFERENTIAL METHOD: ABNORMAL
EOSINOPHIL # BLD AUTO: 0.2 K/UL (ref 0–0.5)
EOSINOPHIL NFR BLD: 4.1 % (ref 0–8)
ERYTHROCYTE [DISTWIDTH] IN BLOOD BY AUTOMATED COUNT: 15.9 % (ref 11.5–14.5)
EST. GFR  (AFRICAN AMERICAN): 47.4 ML/MIN/1.73 M^2
EST. GFR  (NON AFRICAN AMERICAN): 41.1 ML/MIN/1.73 M^2
GLUCOSE SERPL-MCNC: 161 MG/DL (ref 70–110)
HCT VFR BLD AUTO: 25 % (ref 37–48.5)
HGB BLD-MCNC: 7.5 G/DL (ref 12–16)
IMM GRANULOCYTES # BLD AUTO: 0.22 K/UL (ref 0–0.04)
IMM GRANULOCYTES NFR BLD AUTO: 3.9 % (ref 0–0.5)
LYMPHOCYTES # BLD AUTO: 1.5 K/UL (ref 1–4.8)
LYMPHOCYTES NFR BLD: 26.4 % (ref 18–48)
MAGNESIUM SERPL-MCNC: 1.6 MG/DL (ref 1.6–2.6)
MCH RBC QN AUTO: 26.8 PG (ref 27–31)
MCHC RBC AUTO-ENTMCNC: 30 G/DL (ref 32–36)
MCV RBC AUTO: 89 FL (ref 82–98)
MONOCYTES # BLD AUTO: 0.6 K/UL (ref 0.3–1)
MONOCYTES NFR BLD: 11.3 % (ref 4–15)
NEUTROPHILS # BLD AUTO: 3 K/UL (ref 1.8–7.7)
NEUTROPHILS NFR BLD: 53.8 % (ref 38–73)
NRBC BLD-RTO: 0 /100 WBC
PHOSPHATE SERPL-MCNC: 2.2 MG/DL (ref 2.7–4.5)
PLATELET # BLD AUTO: 253 K/UL (ref 150–350)
PMV BLD AUTO: 10.6 FL (ref 9.2–12.9)
POCT GLUCOSE: 188 MG/DL (ref 70–110)
POCT GLUCOSE: 191 MG/DL (ref 70–110)
POCT GLUCOSE: 281 MG/DL (ref 70–110)
POCT GLUCOSE: 282 MG/DL (ref 70–110)
POCT GLUCOSE: 305 MG/DL (ref 70–110)
POTASSIUM SERPL-SCNC: 4.3 MMOL/L (ref 3.5–5.1)
RBC # BLD AUTO: 2.8 M/UL (ref 4–5.4)
SODIUM SERPL-SCNC: 138 MMOL/L (ref 136–145)
WBC # BLD AUTO: 5.57 K/UL (ref 3.9–12.7)

## 2019-09-04 PROCEDURE — 36415 COLL VENOUS BLD VENIPUNCTURE: CPT

## 2019-09-04 PROCEDURE — 85025 COMPLETE CBC W/AUTO DIFF WBC: CPT

## 2019-09-04 PROCEDURE — 86580 TB INTRADERMAL TEST: CPT | Performed by: THORACIC SURGERY (CARDIOTHORACIC VASCULAR SURGERY)

## 2019-09-04 PROCEDURE — 25000003 PHARM REV CODE 250: Performed by: STUDENT IN AN ORGANIZED HEALTH CARE EDUCATION/TRAINING PROGRAM

## 2019-09-04 PROCEDURE — 99232 SBSQ HOSP IP/OBS MODERATE 35: CPT | Mod: ,,, | Performed by: NURSE PRACTITIONER

## 2019-09-04 PROCEDURE — 63600175 PHARM REV CODE 636 W HCPCS: Performed by: STUDENT IN AN ORGANIZED HEALTH CARE EDUCATION/TRAINING PROGRAM

## 2019-09-04 PROCEDURE — 25000003 PHARM REV CODE 250: Performed by: NURSE PRACTITIONER

## 2019-09-04 PROCEDURE — 36000 PLACE NEEDLE IN VEIN: CPT | Performed by: STUDENT IN AN ORGANIZED HEALTH CARE EDUCATION/TRAINING PROGRAM

## 2019-09-04 PROCEDURE — 94761 N-INVAS EAR/PLS OXIMETRY MLT: CPT

## 2019-09-04 PROCEDURE — 80048 BASIC METABOLIC PNL TOTAL CA: CPT

## 2019-09-04 PROCEDURE — 83735 ASSAY OF MAGNESIUM: CPT

## 2019-09-04 PROCEDURE — 63600175 PHARM REV CODE 636 W HCPCS: Performed by: NURSE PRACTITIONER

## 2019-09-04 PROCEDURE — 97116 GAIT TRAINING THERAPY: CPT

## 2019-09-04 PROCEDURE — 25000003 PHARM REV CODE 250: Performed by: PHYSICIAN ASSISTANT

## 2019-09-04 PROCEDURE — 99233 PR SUBSEQUENT HOSPITAL CARE,LEVL III: ICD-10-PCS | Mod: GC,,, | Performed by: INTERNAL MEDICINE

## 2019-09-04 PROCEDURE — 99233 SBSQ HOSP IP/OBS HIGH 50: CPT | Mod: GC,,, | Performed by: INTERNAL MEDICINE

## 2019-09-04 PROCEDURE — 99232 PR SUBSEQUENT HOSPITAL CARE,LEVL II: ICD-10-PCS | Mod: ,,, | Performed by: NURSE PRACTITIONER

## 2019-09-04 PROCEDURE — 30200315 PPD INTRADERMAL TEST REV CODE 302: Performed by: THORACIC SURGERY (CARDIOTHORACIC VASCULAR SURGERY)

## 2019-09-04 PROCEDURE — 84100 ASSAY OF PHOSPHORUS: CPT

## 2019-09-04 PROCEDURE — 20600001 HC STEP DOWN PRIVATE ROOM

## 2019-09-04 RX ORDER — ACETAMINOPHEN 500 MG
1000 TABLET ORAL EVERY 6 HOURS PRN
Status: DISCONTINUED | OUTPATIENT
Start: 2019-09-04 | End: 2019-09-09 | Stop reason: HOSPADM

## 2019-09-04 RX ORDER — ISOSORBIDE MONONITRATE 30 MG/1
30 TABLET, EXTENDED RELEASE ORAL ONCE
Status: COMPLETED | OUTPATIENT
Start: 2019-09-04 | End: 2019-09-04

## 2019-09-04 RX ORDER — INSULIN ASPART 100 [IU]/ML
10 INJECTION, SOLUTION INTRAVENOUS; SUBCUTANEOUS
Status: DISCONTINUED | OUTPATIENT
Start: 2019-09-04 | End: 2019-09-04

## 2019-09-04 RX ORDER — ACETAMINOPHEN 325 MG/1
325 TABLET ORAL ONCE
Status: COMPLETED | OUTPATIENT
Start: 2019-09-04 | End: 2019-09-04

## 2019-09-04 RX ORDER — INSULIN ASPART 100 [IU]/ML
9 INJECTION, SOLUTION INTRAVENOUS; SUBCUTANEOUS
Status: DISCONTINUED | OUTPATIENT
Start: 2019-09-04 | End: 2019-09-05

## 2019-09-04 RX ORDER — ISOSORBIDE MONONITRATE 60 MG/1
60 TABLET, EXTENDED RELEASE ORAL DAILY
Status: DISCONTINUED | OUTPATIENT
Start: 2019-09-04 | End: 2019-09-04

## 2019-09-04 RX ADMIN — FAMOTIDINE 20 MG: 20 TABLET, FILM COATED ORAL at 09:09

## 2019-09-04 RX ADMIN — INSULIN ASPART 8 UNITS: 100 INJECTION, SOLUTION INTRAVENOUS; SUBCUTANEOUS at 11:09

## 2019-09-04 RX ADMIN — ASPIRIN 325 MG: 325 TABLET, DELAYED RELEASE ORAL at 09:09

## 2019-09-04 RX ADMIN — LOSARTAN POTASSIUM 50 MG: 50 TABLET, FILM COATED ORAL at 09:09

## 2019-09-04 RX ADMIN — HEPARIN SODIUM 5000 UNITS: 5000 INJECTION, SOLUTION INTRAVENOUS; SUBCUTANEOUS at 05:09

## 2019-09-04 RX ADMIN — PIPERACILLIN AND TAZOBACTAM 4.5 G: 4; .5 INJECTION, POWDER, LYOPHILIZED, FOR SOLUTION INTRAVENOUS; PARENTERAL at 09:09

## 2019-09-04 RX ADMIN — DIBASIC SODIUM PHOSPHATE, MONOBASIC POTASSIUM PHOSPHATE AND MONOBASIC SODIUM PHOSPHATE 2 TABLET: 852; 155; 130 TABLET ORAL at 09:09

## 2019-09-04 RX ADMIN — PIPERACILLIN AND TAZOBACTAM 4.5 G: 4; .5 INJECTION, POWDER, LYOPHILIZED, FOR SOLUTION INTRAVENOUS; PARENTERAL at 02:09

## 2019-09-04 RX ADMIN — ISOSORBIDE MONONITRATE 60 MG: 60 TABLET, EXTENDED RELEASE ORAL at 06:09

## 2019-09-04 RX ADMIN — ACETAMINOPHEN 325 MG: 325 TABLET ORAL at 06:09

## 2019-09-04 RX ADMIN — INSULIN ASPART 1 UNITS: 100 INJECTION, SOLUTION INTRAVENOUS; SUBCUTANEOUS at 11:09

## 2019-09-04 RX ADMIN — FUROSEMIDE 20 MG: 10 INJECTION, SOLUTION INTRAMUSCULAR; INTRAVENOUS at 09:09

## 2019-09-04 RX ADMIN — INSULIN ASPART 9 UNITS: 100 INJECTION, SOLUTION INTRAVENOUS; SUBCUTANEOUS at 04:09

## 2019-09-04 RX ADMIN — ATORVASTATIN CALCIUM 80 MG: 20 TABLET, FILM COATED ORAL at 09:09

## 2019-09-04 RX ADMIN — POTASSIUM CHLORIDE 40 MEQ: 20 TABLET, EXTENDED RELEASE ORAL at 09:09

## 2019-09-04 RX ADMIN — ACETAMINOPHEN 1000 MG: 500 TABLET ORAL at 08:09

## 2019-09-04 RX ADMIN — FUROSEMIDE 20 MG: 10 INJECTION, SOLUTION INTRAMUSCULAR; INTRAVENOUS at 08:09

## 2019-09-04 RX ADMIN — MAGNESIUM OXIDE TAB 400 MG (241.3 MG ELEMENTAL MG) 800 MG: 400 (241.3 MG) TAB at 09:09

## 2019-09-04 RX ADMIN — METOPROLOL TARTRATE 50 MG: 50 TABLET ORAL at 09:09

## 2019-09-04 RX ADMIN — MAGNESIUM OXIDE TAB 400 MG (241.3 MG ELEMENTAL MG) 800 MG: 400 (241.3 MG) TAB at 08:09

## 2019-09-04 RX ADMIN — HEPARIN SODIUM 5000 UNITS: 5000 INJECTION, SOLUTION INTRAVENOUS; SUBCUTANEOUS at 02:09

## 2019-09-04 RX ADMIN — METOPROLOL TARTRATE 50 MG: 50 TABLET ORAL at 08:09

## 2019-09-04 RX ADMIN — POTASSIUM CHLORIDE 40 MEQ: 20 TABLET, EXTENDED RELEASE ORAL at 08:09

## 2019-09-04 RX ADMIN — ISOSORBIDE MONONITRATE 30 MG: 30 TABLET, EXTENDED RELEASE ORAL at 09:09

## 2019-09-04 RX ADMIN — INSULIN DETEMIR 10 UNITS: 100 INJECTION, SOLUTION SUBCUTANEOUS at 09:09

## 2019-09-04 RX ADMIN — PIPERACILLIN AND TAZOBACTAM 4.5 G: 4; .5 INJECTION, POWDER, LYOPHILIZED, FOR SOLUTION INTRAVENOUS; PARENTERAL at 11:09

## 2019-09-04 RX ADMIN — INSULIN ASPART 8 UNITS: 100 INJECTION, SOLUTION INTRAVENOUS; SUBCUTANEOUS at 09:09

## 2019-09-04 RX ADMIN — HEPARIN SODIUM 5000 UNITS: 5000 INJECTION, SOLUTION INTRAVENOUS; SUBCUTANEOUS at 08:09

## 2019-09-04 RX ADMIN — ACETAMINOPHEN 650 MG: 325 TABLET ORAL at 05:09

## 2019-09-04 RX ADMIN — Medication 5 UNITS: at 02:09

## 2019-09-04 RX ADMIN — ACETAMINOPHEN 1000 MG: 500 TABLET ORAL at 09:09

## 2019-09-04 NOTE — PROGRESS NOTES
[facility]  Cardiothoracic Surgery  Progress Note    Patient Name: Kaylee Romero  MRN: [id number]  Admission Date: 8/27/2019  Hospital Length of Stay: 7 days  Code Status: Full Code   Attending Physician: Peterson Ventura MD   Referring Provider: Self, Aaareferral  Principal Problem:S/P CABG (coronary artery bypass graft)      Subjective:     Post-Op Info:  * No surgery found *         Interval History: Pt had c/o HA with HTN again last night. Imdur increased    Review of Systems   Constitution: Negative for malaise/fatigue.   Cardiovascular: Negative for chest pain, claudication, dyspnea on exertion, irregular heartbeat, leg swelling and palpitations.   Respiratory: Negative for cough and shortness of breath.    Hematologic/Lymphatic: Negative for bleeding problem.   Gastrointestinal: Negative for abdominal pain.   Genitourinary: Negative for dysuria.   Neurological: Positive for headaches. Negative for weakness.     Medications:  Continuous Infusions:  Scheduled Meds:   aspirin  325 mg Oral Daily    atorvastatin  80 mg Oral Daily    famotidine  20 mg Oral Daily    furosemide  20 mg Intravenous BID    heparin (porcine)  5,000 Units Subcutaneous Q8H    insulin aspart U-100  8 Units Subcutaneous TIDWM    insulin detemir U-100  10 Units Subcutaneous Daily    [START ON 9/5/2019] isosorbide mononitrate  90 mg Oral Daily    losartan  50 mg Oral Daily    magnesium oxide  800 mg Oral BID    metoprolol tartrate  50 mg Oral BID    piperacillin-tazobactam (ZOSYN) IVPB  4.5 g Intravenous Q8H    polyethylene glycol  17 g Oral Daily    potassium chloride  40 mEq Oral BID    senna-docusate 8.6-50 mg  1 tablet Oral BID     PRN Meds:acetaminophen, bisacodyl, Dextrose 10% Bolus, Dextrose 10% Bolus, Dextrose 10% Bolus, glucagon (human recombinant), glucose, glucose, insulin aspart U-100, ondansetron, oxyCODONE, sodium chloride 0.9%     Objective:     Vital Signs (Most Recent):  Temp: 98.4 °F  (36.9 °C) (09/04/19 0517)  Pulse: 82 (09/04/19 0600)  Resp: 18 (09/03/19 2028)  BP: (!) 180/92 (09/04/19 0641)  SpO2: 95 % (09/04/19 0517) Vital Signs (24h Range):  Temp:  [97.4 °F (36.3 °C)-98.6 °F (37 °C)] 98.4 °F (36.9 °C)  Pulse:  [67-98] 82  Resp:  [18] 18  SpO2:  [92 %-98 %] 95 %  BP: (118-180)/(64-92) 180/92     Weight: 101.8 kg (224 lb 6.9 oz)  Body mass index is 37.35 kg/m².    SpO2: 95 %  O2 Device (Oxygen Therapy): room air    Intake/Output - Last 3 Shifts       09/02 0700 - 09/03 0659 09/03 0700 - 09/04 0659 09/04 0700 - 09/05 0659    P.O. 1200 960     Total Intake(mL/kg) 1200 (11.7) 960 (9.4)     Urine (mL/kg/hr) 1250 (0.5) 300 (0.1)     Stool 0 0     Total Output 1250 300     Net -50 +660            Urine Occurrence 1 x 4 x     Stool Occurrence 1 x 3 x           Lines/Drains/Airways     Peripheral Intravenous Line                 Peripheral IV - Single Lumen 09/01/19 0540 20 G Left Forearm 3 days                Physical Exam   Constitutional: She is oriented to person, place, and time. She appears well-developed and well-nourished.   Cardiovascular: Normal rate, regular rhythm and normal heart sounds.   Pulmonary/Chest: Effort normal and breath sounds normal.   Abdominal: Soft.   Musculoskeletal: Normal range of motion. She exhibits edema.   Neurological: She is alert and oriented to person, place, and time.   Skin: Skin is warm, dry and intact.   Psychiatric: She has a normal mood and affect.       Significant Labs:  BMP:   Recent Labs   Lab 09/04/19 0609   *      K 4.3      CO2 24   BUN 7*   CREATININE 1.3   CALCIUM 8.7   MG 1.6     CBC:   Recent Labs   Lab 09/04/19 0609   WBC 5.57   RBC 2.80*   HGB 7.5*   HCT 25.0*      MCV 89   MCH 26.8*   MCHC 30.0*       Significant Diagnostics:  I have reviewed all pertinent imaging results/findings within the past 24 hours.    Assessment/Plan:     * S/P CABG (coronary artery bypass graft)  Sternal  Precautions  PT/OT  Ambulate  ASA  Metop  Imdur increased to 90 daily    Acute blood loss anemia  H/H 7.7 from 7.1  CBC daily   Hold off transfusion for now as improving and largely dilutional from admit Hb level    Type 2 diabetes mellitus with stage 3 chronic kidney disease, without long-term current use of insulin  Endocrine following    Physical deconditioning  PT/OT   Planning for SNF placement     CKD (chronic kidney disease) stage 3, GFR 30-59 ml/min  DOREEN on CKD 3 s/p volume resuscitation  Strict I/O --> will need to assess UOP  qAM BMP  Creatinine stable  Lasix 20IV BID     Morbid obesity with body mass index (BMI) of 40.0 or higher  PT/OT   Cardiac diet         Keila Warner NP  Cardiothoracic Surgery  [facility]

## 2019-09-04 NOTE — HPI
Kaylee Romero is a 74 y.o. F with CAD s/p CABG on 8/12/19 (LIMA to LAD, SVG to OM), HTN, CKD III, obesity, KAMRAN who was admitted 8/29/19 for sepsis secondary to LLE cellulitis infection and DOREEN, for which she has been treated for with antibiotics. Cardiology has been consulted for further management of her HTN.   Pt reports h/o chronic HTN and prior to CABG being on chlorthalidone, coreg, procardia, losartan. She was previously also on amlodipine, which was changed to nifedipine during her hospitalization for NSTEMI in July 2019.   On this admission, there are SBP ranging from 110-180s/60-90s, with more higher BP recorded in the evenings. She says neck pain wakes her up at night and she is not using her CPAP. Else, she denies any chest pain, shortness of breath or headaches associated with these elevated BP. .

## 2019-09-04 NOTE — PROGRESS NOTES
09/04/19 0640 09/04/19 0641   Vital Signs   BP (!) 154/79 (!) 180/92   MAP (mmHg) 110 128   BP Location Right arm Left arm   BP Method Automatic Automatic   Patient Position Lying Lying     Notified MD PEPE of patient above vitals. Patient is symptomatic with headache. Acetaminophen 650mg PO PRN given at 0519, but per patient medication is not helping. Patient states she takes 1000mg of Acetaminophen PO. Patient refused oxy 5mg PO PRN. Patient is requesting additional dose of 600mg of acetaminophen. MD Pepe gave orders to place additional acetaminophen 325mg to be given x1 and for schedule dose of Imdur 60mg PO to be given now. Will cont to monitor patient.

## 2019-09-04 NOTE — ASSESSMENT & PLAN NOTE
BG goal 140 - 180     Start Levemir 10 units daily in AM  Increase Novolog to 9 units with meals  Low dose correction scale  BG monitoring AC/HS    Discharge planning: TBD, if patient discharges to SNF that allows home meds - recommend patient resume home medication regimen of Levemir 8 units q HS, Glipizide 20 mg daily, Trulicity 1.5 mg SQ once weekly.  If SNF only allows insulin therapy for DM management, recommend continuing MDI regimen while in-patient.  Will setup follow up appointment with MAURICIO Pritchard NP once patient discharges from facility.

## 2019-09-04 NOTE — CONSULTS
[facility]  Cardiology  Consult Note    Patient Name: Kaylee Romero  MRN: [id number]  Admission Date: 8/27/2019  Hospital Length of Stay: 7 days  Code Status: Full Code   Attending Provider: Peterson Ventura MD   Consulting Provider: Lo Nelson MD  Primary Care Physician: Liz Roberts MD  Principal Problem:S/P CABG (coronary artery bypass graft)    Patient information was obtained from patient, past medical records and ER records.     Inpatient consult to Cardiology  Consult performed by: Lo Nelson MD  Consult ordered by: Keila Warner NP        Subjective:     Chief Complaint:  hypertension     HPI:   Kaylee Romero is a 74 y.o. F with CAD s/p CABG on 8/12/19 (LIMA to LAD, SVG to OM), HTN, CKD III, obesity, KAMRAN who was admitted 8/29/19 for sepsis secondary to LLE cellulitis infection and DOREEN, for which she has been treated for with antibiotics. Cardiology has been consulted for further management of her HTN.     Pt reports h/o chronic HTN and prior to CABG being on chlorthalidone, coreg, procardia, losartan. She was previously also on amlodipine, which was changed to nifedipine during her hospitalization for NSTEMI in July 2019.     On this admission, there are SBP ranging from 110-180s/60-90s, with more higher BP recorded in the evenings. She says neck pain wakes her up at night and she is not using her CPAP. Else, she denies any chest pain, shortness of breath or headaches associated with these elevated BP. .       Past Medical History:   Diagnosis Date    Acid reflux     Age-related osteoporosis without current pathological fracture 1/11/2019    Cataract     CKD (chronic kidney disease) stage 3, GFR 30-59 ml/min     Dry mouth     History of TIA (transient ischemic attack) 12/11/2018    Hyperlipidemia 12/2/2014    Hypertensive heart disease with diastolic heart failure 11/28/2012    Morbid obesity with body mass index (BMI) of 40.0 or higher 5/9/2016    KAMRAN  (obstructive sleep apnea)     KAMRAN on CPAP 2016    Osteoporosis without current pathological fracture 2018    Physical deconditioning 2018    Status post total left knee replacement 2017    Type 2 diabetes mellitus with stage 3 chronic kidney disease, without long-term current use of insulin 2019       Past Surgical History:   Procedure Laterality Date    ankle surgery (l)      APPENDECTOMY       SECTION      CORONARY ARTERY BYPASS GRAFT (CABG)X3 N/A 2019    Performed by [facility] at [facility] OR [facility]    DILATION AND CURETTAGE OF UTERUS      KNEE ARTHROSCOPY W/ DEBRIDEMENT      KNEE SURGERY Left 2017    TKR    Left heart cath Left 2019    Performed by Ronak Gibbons MD at [facility] CATH LAB    REPLACEMENT-KNEE-TOTAL Left 2017    [facility] OR [facility]    SURGICAL PROCUREMENT, VEIN, ENDOSCOPIC Left 2019    Performed by Peterson Ventura MD at [facility] OR [facility]       Review of patient's allergies indicates:   Allergen Reactions    Bactrim [sulfamethoxazole-trimethoprim] Other (See Comments)     Generic version  Sulfa makes her sick    Keflex [cephalexin] Other (See Comments)     Turns orange       No current facility-administered medications on file prior to encounter.      Current Outpatient Medications on File Prior to Encounter   Medication Sig    alcohol swabs (ALCOHOL PADS) PadM Apply 1 each topically as needed.    alendronate (FOSAMAX) 70 MG tablet Take 1 tablet (70 mg total) by mouth every 7 days. Take with a full glass of water & remain upright for at least 30 minutes    amiodarone (PACERONE) 400 MG tablet Take 1 tablet (400 mg total) by mouth 2 (two) times daily.    aspirin (ECOTRIN) 325 MG EC tablet Take 1 tablet (325 mg total) by mouth once daily.    atorvastatin (LIPITOR) 80 MG tablet Take 1 tablet (80 mg total) by mouth once daily.    blood sugar diagnostic Strp 1 strip by  "Misc.(Non-Drug; Combo Route) route once daily.    blood-glucose meter kit Use as instructed    docusate sodium (COLACE) 100 MG capsule Take 1 capsule (100 mg total) by mouth 2 (two) times daily.    furosemide (LASIX) 20 MG tablet Take 1 tablet (20 mg total) by mouth 2 (two) times daily.    insulin aspart U-100 (NOVOLOG FLEXPEN U-100 INSULIN) 100 unit/mL (3 mL) InPn pen Inject 10 Units into the skin 3 (three) times daily with meals.    insulin detemir U-100 (LEVEMIR FLEXTOUCH) 100 unit/mL (3 mL) SubQ InPn pen Inject 12 Units into the skin every evening.    lancets Misc 1 lancet by Misc.(Non-Drug; Combo Route) route once daily.    losartan (COZAAR) 50 MG tablet Take 1 tablet (50 mg total) by mouth once daily.    metoprolol tartrate (LOPRESSOR) 25 MG tablet Take 3 tablets (75 mg total) by mouth 2 (two) times daily.    multivitamin (ONE DAILY MULTIVITAMIN) per tablet Take 1 tablet by mouth once daily.    pen needle, diabetic 31 gauge x 5/16" Ndle Use every evening.    potassium chloride SA (K-DUR,KLOR-CON) 20 MEQ tablet Take 1 tablet (20 mEq total) by mouth once daily.    TRULICITY 1.5 mg/0.5 mL PnIj INJECT 1.5 MG UNDER THE SKIN ONCE A WEEK     Family History     Problem Relation (Age of Onset)    Diabetes Father    Heart disease Mother, Father    Heart failure Mother, Father    No Known Problems Brother, Son    Stroke Father, Paternal Grandfather        Tobacco Use    Smoking status: Never Smoker    Smokeless tobacco: Never Used   Substance and Sexual Activity    Alcohol use: Yes     Alcohol/week: 0.0 oz     Comment: social drinker    Drug use: No    Sexual activity: Yes     Partners: Male     Birth control/protection: Post-menopausal     Review of Systems   Constitution: Negative for chills and decreased appetite.   Eyes: Negative for blurred vision.   Cardiovascular: Negative for chest pain, dyspnea on exertion and irregular heartbeat.   Respiratory: Negative for cough and shortness of breath.  "   Gastrointestinal: Negative for constipation, diarrhea, melena, nausea and vomiting.   Neurological: Negative for headaches and weakness.     Objective:     Vital Signs (Most Recent):  Temp: 97.6 °F (36.4 °C) (09/04/19 1554)  Pulse: 79 (09/04/19 1554)  Resp: 18 (09/04/19 1554)  BP: 119/67 (09/04/19 1554)  SpO2: 97 % (09/04/19 1700) Vital Signs (24h Range):  Temp:  [97.4 °F (36.3 °C)-98.6 °F (37 °C)] 97.6 °F (36.4 °C)  Pulse:  [69-98] 79  Resp:  [18] 18  SpO2:  [94 %-98 %] 97 %  BP: (118-180)/(58-93) 119/67     Weight: 101.8 kg (224 lb 6.9 oz)  Body mass index is 37.35 kg/m².    SpO2: 97 %  O2 Device (Oxygen Therapy): room air      Intake/Output Summary (Last 24 hours) at 9/4/2019 1803  Last data filed at 9/4/2019 0900  Gross per 24 hour   Intake 480 ml   Output 300 ml   Net 180 ml       Lines/Drains/Airways          None        Physical Exam   Constitutional: She is oriented to person, place, and time. She appears well-developed and well-nourished.   HENT:   Head: Normocephalic and atraumatic.   Eyes: Pupils are equal, round, and reactive to light.   Neck: No JVD present.   Cardiovascular: Normal rate and regular rhythm.   No murmur heard.  Pulmonary/Chest: Breath sounds normal. No respiratory distress.   Abdominal: Soft. Bowel sounds are normal. She exhibits no distension.   Musculoskeletal: Normal range of motion. She exhibits edema.   Neurological: She is alert and oriented to person, place, and time.   Skin: Skin is warm and dry.   Vitals reviewed.      Significant Labs:   CMP   Recent Labs   Lab 09/03/19  0400 09/04/19  0609    138   K 3.4* 4.3    106   CO2 23 24   * 161*   BUN 8 7*   CREATININE 1.2 1.3   CALCIUM 8.4* 8.7   ANIONGAP 9 8   ESTGFRAFRICA 52.2* 47.4*   EGFRNONAA 45.3* 41.1*    and CBC   Recent Labs   Lab 09/03/19  0400 09/04/19  0609   WBC 5.79 5.57   HGB 7.2* 7.5*   HCT 23.2* 25.0*    253       Significant Imaging:    ECHO 8/28/19:  · Increased (hyperdynamic) left  ventricular systolic function. The estimated ejection fraction is 75%  · Concentric left ventricular remodeling.  · No wall motion abnormalities.  · Normal LV diastolic function.  · Normal right ventricular systolic function.  · Normal central venous pressure (3 mm Hg).  · The estimated PA systolic pressure is 17 mm Hg  · Technically poor study     ECG 8/29/19: NSR at 99 bpm, LVH      Assessment and Plan:     Kaylee Romero is a 74 y.o. F with CAD s/p CABG on 8/12/19 (LIMA to LAD, SVG to OM), HTN, CKD III, obesity, KAMRAN who was admitted 8/29/19 for sepsis secondary to LLE cellulitis infection and DOREEN, for which she has been treated for with antibiotics. Cardiology has been consulted for further management of her hypertensive urgency. Her antihypertensive regimen has changed post her NSTEMI episode in July this year and appears to be not effective at this time. This will need further up-titration of her current meds, as detailed below. In addition, she has KAMRAN and has not been using her CPAP at night, which may be a reversible cause of hypertension in this patient.     Hypertension  - recommend increasing IV lasix to 40mg with first dose tonight for further diuresis.  - recommend increasing losartan 100mg daily  - discontinue isosorbide mononitrate  - goal SBP < 130/80  - will re-evaluate need for further BP titration tomorrow.    KAMRAN on CPAP  - recommend use of CPAP nightly. Pt reports  will bring machine from home tonight.    Coronary artery disease   - s/p CABG LIMA to LAD, SVG to OM  - TTE 8/28/19: EF 75%, PA pressure 17  - continue aspirin, atorva, losartan, lopresso  - manage per primary team    VTE Risk Mitigation (From admission, onward)        Ordered     heparin (porcine) injection 5,000 Units  Every 8 hours      08/29/19 0841     Place sequential compression device  Until discontinued      08/28/19 0525     IP VTE HIGH RISK PATIENT  Once      08/28/19 0525        Thank you for your consult. I  will follow-up with patient. Please contact us if you have any additional questions.    Patient seen and plan of care discussed with primary team and Dr. Orellana.    Lo Nelson MD  Cardiology   [facility]

## 2019-09-04 NOTE — ASSESSMENT & PLAN NOTE
- recommend increasing IV lasix to 40mg x1 dose tonight for further diuresis.  - recommend increasing losartan 100mg daily  - discontinue isosorbide mononitrate  - goal SBP < 130/80  - will re-evaluate need for further BP titration tomorrow.

## 2019-09-04 NOTE — PT/OT/SLP PROGRESS
Physical Therapy Treatment    Patient Name:  Kaylee Romero   MRN:  [id number]    Recommendations:     Discharge Recommendations:  nursing facility, skilled   Discharge Equipment Recommendations: none   Barriers to discharge: Decreased caregiver support    Assessment:     Kaylee Romero is a 74 y.o. female admitted with a medical diagnosis of S/P CABG (coronary artery bypass graft).  She presents with the following impairments/functional limitations:  impaired endurance, impaired functional mobilty, gait instability, impaired balance, weakness, pain. Pt tolerated activity with improved mobility reflected by increased distance ambulated. Pt educated on safe and efficient methods to transfer in/out of bed to prevent future falls. Pt demo'd understanding. Pt would continue to benefit from acute skilled therapy intervention to address deficits and progress toward prior level of function.       Rehab Prognosis: Good; patient would benefit from acute skilled PT services to address these deficits and reach maximum level of function.    Recent Surgery: * No surgery found *      Plan:     During this hospitalization, patient to be seen 3 x/week to address the identified rehab impairments via gait training, therapeutic activities, therapeutic exercises, neuromuscular re-education and progress toward the following goals:    · Plan of Care Expires:  10/01/19    Subjective     Chief Complaint: Pt c/o pain in shoulder   Patient/Family Comments/goals: to get better and return home   Pain/Comfort:  · Pain Rating 1: (Pt reporting pain in L shoulder, did not quantify )  · Pain Addressed 1: Cessation of Activity      Objective:     Communicated with RN prior to session.  Patient found up in chair with peripheral IV, telemetry upon PT entry to room.     General Precautions: Standard, fall, sternal   Orthopedic Precautions:N/A   Braces: N/A     Functional Mobility:  · Bed Mobility:     · Supine to Sit: minimum assistance  · Sit to  Supine: minimum assistance  · Transfers:     · Sit to Stand: from EOB and chair with supervision with no AD  · Gait: Pt ambulated 2x 100 with stand by assistance, pt holding onto IV pole for balance. Pt required occasional standing rest breaks 2/2 reports of fatigue. Pt with no LOB, no SOB, no dizziness.       AM-PAC 6 CLICK MOBILITY  Turning over in bed (including adjusting bedclothes, sheets and blankets)?: 3  Sitting down on and standing up from a chair with arms (e.g., wheelchair, bedside commode, etc.): 4  Moving from lying on back to sitting on the side of the bed?: 3  Moving to and from a bed to a chair (including a wheelchair)?: 4  Need to walk in hospital room?: 4  Climbing 3-5 steps with a railing?: 3  Basic Mobility Total Score: 21       Therapeutic Activities and Exercises:   Pt educated on role of PT/POC. Pt verbalized understanding.   PT educated pt on safe and efficient methods to transfer from supine <> sit to safely get out of bed.   Pt encouraged to only perform OOB mobility with assistance from nursing/therapy. Pt agreeable.   Pt encouraged to ambulate daily with assistance/supervision from nursing/therapy. Pt agreeable.      Patient left up in chair with all lines intact, call button in reach and RN  notified..    GOALS:   Multidisciplinary Problems     Physical Therapy Goals        Problem: Physical Therapy Goal    Goal Priority Disciplines Outcome Goal Variances Interventions   Physical Therapy Goal     PT, PT/OT Ongoing (interventions implemented as appropriate)     Description:  Goals to be met by: 19     Patient will increase functional independence with mobility by performin. Supine to sit with Stand-by Assistance within sternal precautions; HOB flat - Not met  2. Sit to supine with Stand-by Assistance within sternal precautions; HOB flat - Not met  3. Sit to stand transfer with Mod (I) within sternal precautions - Not met  4. Bed to chair transfer with Supervision without device  within sternal precautions - Not met  5. Gait  x 200 feet with Stand-by Assistance - Not met  6. Ascend/descend 4 stairs with right Handrail with Contact Guard Assistance - Not met  7. Lower extremity exercise program x 20 reps per handout, with independence - Not met                     Time Tracking:     PT Received On: 09/04/19  PT Start Time: 1022     PT Stop Time: 1045  PT Total Time (min): 23 min     Billable Minutes: Gait Training 23 mins     Treatment Type: Treatment  PT/PTA: PT           Rachel Cantu, PT  09/04/2019

## 2019-09-04 NOTE — SUBJECTIVE & OBJECTIVE
Past Medical History:   Diagnosis Date    Acid reflux     Age-related osteoporosis without current pathological fracture 2019    Cataract     CKD (chronic kidney disease) stage 3, GFR 30-59 ml/min     Dry mouth     History of TIA (transient ischemic attack) 2018    Hyperlipidemia 2014    Hypertensive heart disease with diastolic heart failure 2012    Morbid obesity with body mass index (BMI) of 40.0 or higher 2016    KAMRAN (obstructive sleep apnea)     KAMRAN on CPAP 2016    Osteoporosis without current pathological fracture 2018    Physical deconditioning 2018    Status post total left knee replacement 2017    Type 2 diabetes mellitus with stage 3 chronic kidney disease, without long-term current use of insulin 2019       Past Surgical History:   Procedure Laterality Date    ankle surgery (l)      APPENDECTOMY       SECTION      CORONARY ARTERY BYPASS GRAFT (CABG)X3 N/A 2019    Performed by Peterson [facility]ura MD at [facility] OR [address] [place] [address]    DILATION AND CURETTAGE OF UTERUS      KNEE ARTHROSCOPY W/ DEBRIDEMENT      KNEE SURGERY Left 2017    TKR    Left heart cath Left 2019    Performed by Ronak Gibbons MD at [facility] CATH LAB    REPLACEMENT-KNEE-TOTAL Left 2017    [facility]    SURGICAL PROCUREMENT, VEIN, ENDOSCOPIC Left 2019    Performed by Peterson Ventura MD at [facility] OR [address] [place] [address]       Review of patient's allergies indicates:   Allergen Reactions    Bactrim [sulfamethoxazole-trimethoprim] Other (See Comments)     Generic version  Sulfa makes her sick    Keflex [cephalexin] Other (See Comments)     Turns orange       No current facility-administered medications on file prior to encounter.      Current Outpatient Medications on File Prior to Encounter   Medication Sig    alcohol swabs (ALCOHOL PADS) PadM Apply 1 each topically as needed.    alendronate  "(FOSAMAX) 70 MG tablet Take 1 tablet (70 mg total) by mouth every 7 days. Take with a full glass of water & remain upright for at least 30 minutes    amiodarone (PACERONE) 400 MG tablet Take 1 tablet (400 mg total) by mouth 2 (two) times daily.    aspirin (ECOTRIN) 325 MG EC tablet Take 1 tablet (325 mg total) by mouth once daily.    atorvastatin (LIPITOR) 80 MG tablet Take 1 tablet (80 mg total) by mouth once daily.    blood sugar diagnostic Strp 1 strip by Misc.(Non-Drug; Combo Route) route once daily.    blood-glucose meter kit Use as instructed    docusate sodium (COLACE) 100 MG capsule Take 1 capsule (100 mg total) by mouth 2 (two) times daily.    furosemide (LASIX) 20 MG tablet Take 1 tablet (20 mg total) by mouth 2 (two) times daily.    insulin aspart U-100 (NOVOLOG FLEXPEN U-100 INSULIN) 100 unit/mL (3 mL) InPn pen Inject 10 Units into the skin 3 (three) times daily with meals.    insulin detemir U-100 (LEVEMIR FLEXTOUCH) 100 unit/mL (3 mL) SubQ InPn pen Inject 12 Units into the skin every evening.    lancets Misc 1 lancet by Misc.(Non-Drug; Combo Route) route once daily.    losartan (COZAAR) 50 MG tablet Take 1 tablet (50 mg total) by mouth once daily.    metoprolol tartrate (LOPRESSOR) 25 MG tablet Take 3 tablets (75 mg total) by mouth 2 (two) times daily.    multivitamin (ONE DAILY MULTIVITAMIN) per tablet Take 1 tablet by mouth once daily.    pen needle, diabetic 31 gauge x 5/16" Ndle Use every evening.    potassium chloride SA (K-DUR,KLOR-CON) 20 MEQ tablet Take 1 tablet (20 mEq total) by mouth once daily.    TRULICITY 1.5 mg/0.5 mL PnIj INJECT 1.5 MG UNDER THE SKIN ONCE A WEEK     Family History     Problem Relation (Age of Onset)    Diabetes Father    Heart disease Mother, Father    Heart failure Mother, Father    No Known Problems Brother, Son    Stroke Father, Paternal Grandfather        Tobacco Use    Smoking status: Never Smoker    Smokeless tobacco: Never Used   Substance and " Sexual Activity    Alcohol use: Yes     Alcohol/week: 0.0 oz     Comment: social drinker    Drug use: No    Sexual activity: Yes     Partners: Male     Birth control/protection: Post-menopausal     Review of Systems   Constitution: Negative for chills and decreased appetite.   Eyes: Negative for blurred vision.   Cardiovascular: Negative for chest pain, dyspnea on exertion and irregular heartbeat.   Respiratory: Negative for cough and shortness of breath.    Gastrointestinal: Negative for constipation, diarrhea, melena, nausea and vomiting.   Neurological: Negative for headaches and weakness.     Objective:     Vital Signs (Most Recent):  Temp: 97.6 °F (36.4 °C) (09/04/19 1554)  Pulse: 79 (09/04/19 1554)  Resp: 18 (09/04/19 1554)  BP: 119/67 (09/04/19 1554)  SpO2: 97 % (09/04/19 1700) Vital Signs (24h Range):  Temp:  [97.4 °F (36.3 °C)-98.6 °F (37 °C)] 97.6 °F (36.4 °C)  Pulse:  [69-98] 79  Resp:  [18] 18  SpO2:  [94 %-98 %] 97 %  BP: (118-180)/(58-93) 119/67     Weight: 101.8 kg (224 lb 6.9 oz)  Body mass index is 37.35 kg/m².    SpO2: 97 %  O2 Device (Oxygen Therapy): room air      Intake/Output Summary (Last 24 hours) at 9/4/2019 1803  Last data filed at 9/4/2019 0900  Gross per 24 hour   Intake 480 ml   Output 300 ml   Net 180 ml       Lines/Drains/Airways          None        Physical Exam   Constitutional: She is oriented to person, place, and time. She appears well-developed and well-nourished.   HENT:   Head: Normocephalic and atraumatic.   Eyes: Pupils are equal, round, and reactive to light.   Neck: No JVD present.   Cardiovascular: Normal rate and regular rhythm.   No murmur heard.  Pulmonary/Chest: Breath sounds normal. No respiratory distress.   Abdominal: Soft. Bowel sounds are normal. She exhibits no distension.   Musculoskeletal: Normal range of motion. She exhibits edema.   Neurological: She is alert and oriented to person, place, and time.   Skin: Skin is warm and dry.   Vitals  reviewed.      Significant Labs:   CMP   Recent Labs   Lab 09/03/19  0400 09/04/19  0609    138   K 3.4* 4.3    106   CO2 23 24   * 161*   BUN 8 7*   CREATININE 1.2 1.3   CALCIUM 8.4* 8.7   ANIONGAP 9 8   ESTGFRAFRICA 52.2* 47.4*   EGFRNONAA 45.3* 41.1*    and CBC   Recent Labs   Lab 09/03/19  0400 09/04/19  0609   WBC 5.79 5.57   HGB 7.2* 7.5*   HCT 23.2* 25.0*    253       Significant Imaging:

## 2019-09-04 NOTE — PLAN OF CARE
Problem: Adult Inpatient Plan of Care  Goal: Patient-Specific Goal (Individualization)  Dx:  Sepsis  Hx:  CABG x3, DM2, KAMRAN, HLD, CKD3, TIA, osteoporosis, Hypertensive heart disease c diastolic HF    8/28:  Admit to SICU from ED.  2 L LR.  Gram negative anaerobic rods on blood cultures.  SILVA and US of LLE.      Nursing:  Accuchecks q6h   Plan of care discussed with patient. Patient is free of fall/trauma/injury. Denies CP, SOB, or pain/discomfort. All questions addressed. IVPB abx administered per order. IVP lasix administered per order. TB skin test placed on L FA and circled - to be read 9/6/19 @ 1400. Will continue to monitor

## 2019-09-04 NOTE — PLAN OF CARE
09/04/19 1458   Post-Acute Status   Post-Acute Authorization Placement   Post-Acute Placement Status Referrals Sent     [facility] will not have any beds available until next week. The patient is medically ready to transfer to a skilled nursing facility pending acceptance and insurance auth. SW submitted for LOCET/142, PPD placement ordered, pt has recent CXR. Referrals sent to [facility] per patients request. Pending acceptance and subsequent insurance with from N.

## 2019-09-04 NOTE — PROGRESS NOTES
1645  Lost IV access. Attempted to regain access. No access gained.     1730  Informed charge RN of loss of access. Charge RN attempted but unsucessful.    1745  Informed CSU charge RN to attempt if available.     1815  CSU charge RN unavailable to come attempt.     1820  Informed MD. MD suggested anesthesia to come attempt.    1825  Anesthesia called but unable to come at the moment. Suggested to call ICU.    1830  Called [facility] for help starting IV. [facility] unable to send a nurse down at this time. [facility] charge RN suggested to call back after shift change when a float nurse will be available. Will pass on to night shift to call [facility] after shift change to get IV access. Will continue to monitor.

## 2019-09-04 NOTE — PROGRESS NOTES
"[facility]  Endocrinology  Progress Note    Admit Date: 2019     Reason for Consult: Management of T2DM, Hyperglycemia      Surgical Procedure and Date: CABG 19     Diabetes diagnosis year:      Lab Results   Component Value Date    HGBA1C 7.5 (H) 2019       Home Diabetes Medications: Levemir 8 q HS, Glipizide 20 mg daily, Trulicity 1.5 mg SQ once weekly     How often checking glucose at home? About once daily   BG readings on regimen: 160-300  Hypoglycemia on the regimen?  no  Missed doses on regimen? no     Diabetes Complications include:     Hyperglycemia and Diabetic chronic kidney disease         Complicating diabetes co morbidities:   History of MI, KAMRAN and CKD        HPI:   Patient is a 74 y.o. female with a diagnosis of DM2, KAMRAN, CKD, and CAD, who is s/p CABG on 19. Last seen in [facility] endocrinology clinic for DM by MAURICIO Pritchard NP on 19.  Positive family history of DM (father).  Endocrinology consulted for DM/BG management.          Interval HPI:   Overnight events: Remains in CTSU, NAEON.  BG  improved yesterday and well controlled overnight after adjustments to MDI regimen.  On IV antibiotics.  Possible discharge today to SNF.  Eatin%  Nausea: Yes  Hypoglycemia and intervention: No  Fever: No  TPN and/or TF: No    BP (!) 180/93   Pulse 87   Temp 97.5 °F (36.4 °C)   Resp 18   Ht 5' 5" (1.651 m)   Wt 101.8 kg (224 lb 6.9 oz)   LMP 2001 (Approximate)   SpO2 98%   Breastfeeding? No   BMI 37.35 kg/m²      Labs Reviewed and Include    Recent Labs   Lab 19  0609   *   CALCIUM 8.7      K 4.3   CO2 24      BUN 7*   CREATININE 1.3     Lab Results   Component Value Date    WBC 5.57 2019    HGB 7.5 (L) 2019    HCT 25.0 (L) 2019    MCV 89 2019     2019     No results for input(s): TSH, FREET4 in the last 168 hours.  Lab Results   Component Value Date    HGBA1C 7.5 (H) 2019 "       Nutritional status:   Body mass index is 37.35 kg/m².  Lab Results   Component Value Date    ALBUMIN 2.5 (L) 08/28/2019    ALBUMIN 2.8 (L) 08/28/2019    ALBUMIN 3.3 (L) 08/07/2019     No results found for: PREALBUMIN    Estimated Creatinine Clearance: 46.3 mL/min (based on SCr of 1.3 mg/dL).    Accu-Checks  Recent Labs     09/02/19  0559 09/02/19  1255 09/02/19  1645 09/02/19  2042 09/02/19  2323 09/03/19  0404 09/03/19  0739 09/03/19  1155 09/03/19  1654 09/03/19  2046   POCTGLUCOSE 201* 220* 258* 290* 253* 185* 176* 220* 169* 184*       Current Medications and/or Treatments Impacting Glycemic Control  Immunotherapy:    Immunosuppressants     None        Steroids:   Hormones (From admission, onward)    None        Pressors:    Autonomic Drugs (From admission, onward)    None        Hyperglycemia/Diabetes Medications:   Antihyperglycemics (From admission, onward)    Start     Stop Route Frequency Ordered    09/03/19 1645  insulin aspart U-100 pen 8 Units      -- SubQ 3 times daily with meals 09/03/19 1507    09/03/19 0900  insulin detemir U-100 pen 10 Units      -- SubQ Daily 09/03/19 0741    09/03/19 0841  insulin aspart U-100 pen 0-5 Units      -- SubQ Before meals & nightly PRN 09/03/19 0741          ASSESSMENT and PLAN    * S/P CABG (coronary artery bypass graft)  Managed per primary team  Avoid hypoglycemia  Optimize BG control for surgical wound healing        Type 2 diabetes mellitus with stage 3 chronic kidney disease, without long-term current use of insulin  BG goal 140 - 180     Start Levemir 10 units daily in AM  Increase Novolog to 9 units with meals  Low dose correction scale  BG monitoring AC/HS    Discharge planning: TBD, if patient discharges to SNF that allows home meds - recommend patient resume home medication regimen of Levemir 8 units q HS, Glipizide 20 mg daily, Trulicity 1.5 mg SQ once weekly.  If SNF only allows insulin therapy for DM management, recommend continuing MDI regimen while  in-patient.  Will setup follow up appointment with MAURICIO Pritchard NP once patient discharges from facility.        CKD (chronic kidney disease) stage 3, GFR 30-59 ml/min  Titrate insulin slowly to avoid hypoglycemia as the risk of hypoglycemia increases with decreased creatinine clearance.  Caution with insulin stacking  Estimated Creatinine Clearance: 46.3 mL/min (based on SCr of 1.3 mg/dL).        Morbid obesity with body mass index (BMI) of 40.0 or higher  may contribute to insulin resistance  Body mass index is 37.35 kg/m².            Mikey Pritchard NP  Endocrinology  [facility]

## 2019-09-04 NOTE — PLAN OF CARE
Problem: Adult Inpatient Plan of Care  Goal: Plan of Care Review  Outcome: Ongoing (interventions implemented as appropriate)  Patient remains free from falls and injuries through out shift. Patient AAOx4. Patient denies chest pain and SOB and VSS. BP have been WNL thus far on shift. IVPB Zosyn given q8hr on shift. Patient is blood glucose checks ACHS, last . Per patient she has had at 4-5 episodes of loose stools between day shift and thus far on shift. Lasix 20mg IVP given BID. Electrolyte replacement given, will f/u with morning labs. MSI is CDI and SRINIVASAN with sutures still intact. Contact precautions maintained. Sternal precautions teaching reinforced. Call bell is in reach of patient and patient instructed to use call bell prior to ambulating. POC is for patient to d/c to OSNF pending placement. Plan of care reviewed with patient. Patient verbalizes understanding of plan.  Will continue to monitor.

## 2019-09-04 NOTE — SUBJECTIVE & OBJECTIVE
Interval History: Pt had c/o HA with HTN again last night. Imdur increased    Review of Systems   Constitution: Negative for malaise/fatigue.   Cardiovascular: Negative for chest pain, claudication, dyspnea on exertion, irregular heartbeat, leg swelling and palpitations.   Respiratory: Negative for cough and shortness of breath.    Hematologic/Lymphatic: Negative for bleeding problem.   Gastrointestinal: Negative for abdominal pain.   Genitourinary: Negative for dysuria.   Neurological: Positive for headaches. Negative for weakness.     Medications:  Continuous Infusions:  Scheduled Meds:   aspirin  325 mg Oral Daily    atorvastatin  80 mg Oral Daily    famotidine  20 mg Oral Daily    furosemide  20 mg Intravenous BID    heparin (porcine)  5,000 Units Subcutaneous Q8H    insulin aspart U-100  8 Units Subcutaneous TIDWM    insulin detemir U-100  10 Units Subcutaneous Daily    [START ON 9/5/2019] isosorbide mononitrate  90 mg Oral Daily    losartan  50 mg Oral Daily    magnesium oxide  800 mg Oral BID    metoprolol tartrate  50 mg Oral BID    piperacillin-tazobactam (ZOSYN) IVPB  4.5 g Intravenous Q8H    polyethylene glycol  17 g Oral Daily    potassium chloride  40 mEq Oral BID    senna-docusate 8.6-50 mg  1 tablet Oral BID     PRN Meds:acetaminophen, bisacodyl, Dextrose 10% Bolus, Dextrose 10% Bolus, Dextrose 10% Bolus, glucagon (human recombinant), glucose, glucose, insulin aspart U-100, ondansetron, oxyCODONE, sodium chloride 0.9%     Objective:     Vital Signs (Most Recent):  Temp: 98.4 °F (36.9 °C) (09/04/19 0517)  Pulse: 82 (09/04/19 0600)  Resp: 18 (09/03/19 2028)  BP: (!) 180/92 (09/04/19 0641)  SpO2: 95 % (09/04/19 0517) Vital Signs (24h Range):  Temp:  [97.4 °F (36.3 °C)-98.6 °F (37 °C)] 98.4 °F (36.9 °C)  Pulse:  [67-98] 82  Resp:  [18] 18  SpO2:  [92 %-98 %] 95 %  BP: (118-180)/(64-92) 180/92     Weight: 101.8 kg (224 lb 6.9 oz)  Body mass index is 37.35 kg/m².    SpO2: 95 %  O2 Device  (Oxygen Therapy): room air    Intake/Output - Last 3 Shifts       09/02 0700 - 09/03 0659 09/03 0700 - 09/04 0659 09/04 0700 - 09/05 0659    P.O. 1200 960     Total Intake(mL/kg) 1200 (11.7) 960 (9.4)     Urine (mL/kg/hr) 1250 (0.5) 300 (0.1)     Stool 0 0     Total Output 1250 300     Net -50 +660            Urine Occurrence 1 x 4 x     Stool Occurrence 1 x 3 x           Lines/Drains/Airways     Peripheral Intravenous Line                 Peripheral IV - Single Lumen 09/01/19 0540 20 G Left Forearm 3 days                Physical Exam   Constitutional: She is oriented to person, place, and time. She appears well-developed and well-nourished.   Cardiovascular: Normal rate, regular rhythm and normal heart sounds.   Pulmonary/Chest: Effort normal and breath sounds normal.   Abdominal: Soft.   Musculoskeletal: Normal range of motion. She exhibits edema.   Neurological: She is alert and oriented to person, place, and time.   Skin: Skin is warm, dry and intact.   Psychiatric: She has a normal mood and affect.       Significant Labs:  BMP:   Recent Labs   Lab 09/04/19  0609   *      K 4.3      CO2 24   BUN 7*   CREATININE 1.3   CALCIUM 8.7   MG 1.6     CBC:   Recent Labs   Lab 09/04/19  0609   WBC 5.57   RBC 2.80*   HGB 7.5*   HCT 25.0*      MCV 89   MCH 26.8*   MCHC 30.0*       Significant Diagnostics:  I have reviewed all pertinent imaging results/findings within the past 24 hours.

## 2019-09-04 NOTE — SUBJECTIVE & OBJECTIVE
"Interval HPI:   Overnight events: Remains in CTSU, DAYSI.  BG improved yesterday and well controlled overnight after adjustments to MDI regimen.  On IV antibiotics.  Possible discharge today to SNF.  Eatin%  Nausea: Yes  Hypoglycemia and intervention: No  Fever: No  TPN and/or TF: No    BP (!) 180/93   Pulse 87   Temp 97.5 °F (36.4 °C)   Resp 18   Ht 5' 5" (1.651 m)   Wt 101.8 kg (224 lb 6.9 oz)   LMP 2001 (Approximate)   SpO2 98%   Breastfeeding? No   BMI 37.35 kg/m²     Labs Reviewed and Include    Recent Labs   Lab 19  0609   *   CALCIUM 8.7      K 4.3   CO2 24      BUN 7*   CREATININE 1.3     Lab Results   Component Value Date    WBC 5.57 2019    HGB 7.5 (L) 2019    HCT 25.0 (L) 2019    MCV 89 2019     2019     No results for input(s): TSH, FREET4 in the last 168 hours.  Lab Results   Component Value Date    HGBA1C 7.5 (H) 2019       Nutritional status:   Body mass index is 37.35 kg/m².  Lab Results   Component Value Date    ALBUMIN 2.5 (L) 2019    ALBUMIN 2.8 (L) 2019    ALBUMIN 3.3 (L) 2019     No results found for: PREALBUMIN    Estimated Creatinine Clearance: 46.3 mL/min (based on SCr of 1.3 mg/dL).    Accu-Checks  Recent Labs     19  0559 19  1255 19  1645 19  2042 19  2323 19  0404 19  0739 19  1155 19  1654 19  2046   POCTGLUCOSE 201* 220* 258* 290* 253* 185* 176* 220* 169* 184*       Current Medications and/or Treatments Impacting Glycemic Control  Immunotherapy:    Immunosuppressants     None        Steroids:   Hormones (From admission, onward)    None        Pressors:    Autonomic Drugs (From admission, onward)    None        Hyperglycemia/Diabetes Medications:   Antihyperglycemics (From admission, onward)    Start     Stop Route Frequency Ordered    19 9167  insulin aspart U-100 pen 8 Units      -- SubQ 3 times daily with meals " 09/03/19 1507    09/03/19 0900  insulin detemir U-100 pen 10 Units      -- SubQ Daily 09/03/19 0741    09/03/19 0841  insulin aspart U-100 pen 0-5 Units      -- SubQ Before meals & nightly PRN 09/03/19 0741

## 2019-09-04 NOTE — PLAN OF CARE
Problem: Physical Therapy Goal  Goal: Physical Therapy Goal  Goals to be met by: 19     Patient will increase functional independence with mobility by performin. Supine to sit with Stand-by Assistance within sternal precautions; HOB flat - Not met  2. Sit to supine with Stand-by Assistance within sternal precautions; HOB flat - Not met  3. Sit to stand transfer with Mod (I) within sternal precautions - Not met  4. Bed to chair transfer with Supervision without device within sternal precautions - Not met  5. Gait  x 200 feet with Stand-by Assistance - Not met  6. Ascend/descend 4 stairs with right Handrail with Contact Guard Assistance - Not met  7. Lower extremity exercise program x 20 reps per handout, with independence - Not met    Outcome: Ongoing (interventions implemented as appropriate)  Pt is progressing toward goals. All goals remain appropriate.

## 2019-09-04 NOTE — ASSESSMENT & PLAN NOTE
Titrate insulin slowly to avoid hypoglycemia as the risk of hypoglycemia increases with decreased creatinine clearance.  Caution with insulin stacking  Estimated Creatinine Clearance: 46.3 mL/min (based on SCr of 1.3 mg/dL).

## 2019-09-05 LAB
ANION GAP SERPL CALC-SCNC: 9 MMOL/L (ref 8–16)
ANISOCYTOSIS BLD QL SMEAR: SLIGHT
BASOPHILS # BLD AUTO: ABNORMAL K/UL (ref 0–0.2)
BASOPHILS NFR BLD: 0 % (ref 0–1.9)
BUN SERPL-MCNC: 9 MG/DL (ref 8–23)
BURR CELLS BLD QL SMEAR: ABNORMAL
CALCIUM SERPL-MCNC: 8.7 MG/DL (ref 8.7–10.5)
CHLORIDE SERPL-SCNC: 106 MMOL/L (ref 95–110)
CO2 SERPL-SCNC: 24 MMOL/L (ref 23–29)
CREAT SERPL-MCNC: 1.4 MG/DL (ref 0.5–1.4)
DIFFERENTIAL METHOD: ABNORMAL
EOSINOPHIL # BLD AUTO: ABNORMAL K/UL (ref 0–0.5)
EOSINOPHIL NFR BLD: 2 % (ref 0–8)
ERYTHROCYTE [DISTWIDTH] IN BLOOD BY AUTOMATED COUNT: 16.7 % (ref 11.5–14.5)
EST. GFR  (AFRICAN AMERICAN): 43.3 ML/MIN/1.73 M^2
EST. GFR  (NON AFRICAN AMERICAN): 37.6 ML/MIN/1.73 M^2
GLUCOSE SERPL-MCNC: 183 MG/DL (ref 70–110)
HCT VFR BLD AUTO: 23.8 % (ref 37–48.5)
HGB BLD-MCNC: 7.4 G/DL (ref 12–16)
IMM GRANULOCYTES # BLD AUTO: ABNORMAL K/UL (ref 0–0.04)
IMM GRANULOCYTES NFR BLD AUTO: ABNORMAL % (ref 0–0.5)
LYMPHOCYTES # BLD AUTO: ABNORMAL K/UL (ref 1–4.8)
LYMPHOCYTES NFR BLD: 26 % (ref 18–48)
MAGNESIUM SERPL-MCNC: 1.7 MG/DL (ref 1.6–2.6)
MCH RBC QN AUTO: 27.4 PG (ref 27–31)
MCHC RBC AUTO-ENTMCNC: 31.1 G/DL (ref 32–36)
MCV RBC AUTO: 88 FL (ref 82–98)
METAMYELOCYTES NFR BLD MANUAL: 3 %
MONOCYTES # BLD AUTO: ABNORMAL K/UL (ref 0.3–1)
MONOCYTES NFR BLD: 9 % (ref 4–15)
MYELOCYTES NFR BLD MANUAL: 1 %
NEUTROPHILS NFR BLD: 58 % (ref 38–73)
NEUTS BAND NFR BLD MANUAL: 1 %
NRBC BLD-RTO: 0 /100 WBC
PHOSPHATE SERPL-MCNC: 3 MG/DL (ref 2.7–4.5)
PLATELET # BLD AUTO: 253 K/UL (ref 150–350)
PLATELET BLD QL SMEAR: ABNORMAL
PMV BLD AUTO: 10.6 FL (ref 9.2–12.9)
POCT GLUCOSE: 145 MG/DL (ref 70–110)
POCT GLUCOSE: 167 MG/DL (ref 70–110)
POCT GLUCOSE: 168 MG/DL (ref 70–110)
POCT GLUCOSE: 222 MG/DL (ref 70–110)
POIKILOCYTOSIS BLD QL SMEAR: SLIGHT
POLYCHROMASIA BLD QL SMEAR: ABNORMAL
POTASSIUM SERPL-SCNC: 4.8 MMOL/L (ref 3.5–5.1)
RBC # BLD AUTO: 2.7 M/UL (ref 4–5.4)
SCHISTOCYTES BLD QL SMEAR: PRESENT
SODIUM SERPL-SCNC: 139 MMOL/L (ref 136–145)
WBC # BLD AUTO: 5.4 K/UL (ref 3.9–12.7)

## 2019-09-05 PROCEDURE — 85027 COMPLETE CBC AUTOMATED: CPT

## 2019-09-05 PROCEDURE — 99222 PR INITIAL HOSPITAL CARE,LEVL II: ICD-10-PCS | Mod: ,,, | Performed by: NURSE PRACTITIONER

## 2019-09-05 PROCEDURE — 63600175 PHARM REV CODE 636 W HCPCS: Performed by: STUDENT IN AN ORGANIZED HEALTH CARE EDUCATION/TRAINING PROGRAM

## 2019-09-05 PROCEDURE — 25000003 PHARM REV CODE 250: Performed by: STUDENT IN AN ORGANIZED HEALTH CARE EDUCATION/TRAINING PROGRAM

## 2019-09-05 PROCEDURE — 97535 SELF CARE MNGMENT TRAINING: CPT

## 2019-09-05 PROCEDURE — 25000003 PHARM REV CODE 250: Performed by: NURSE PRACTITIONER

## 2019-09-05 PROCEDURE — 63600175 PHARM REV CODE 636 W HCPCS: Performed by: NURSE PRACTITIONER

## 2019-09-05 PROCEDURE — 97530 THERAPEUTIC ACTIVITIES: CPT

## 2019-09-05 PROCEDURE — 25000003 PHARM REV CODE 250: Performed by: PHYSICIAN ASSISTANT

## 2019-09-05 PROCEDURE — 20600001 HC STEP DOWN PRIVATE ROOM

## 2019-09-05 PROCEDURE — 83735 ASSAY OF MAGNESIUM: CPT

## 2019-09-05 PROCEDURE — 99233 SBSQ HOSP IP/OBS HIGH 50: CPT | Mod: GC,,, | Performed by: INTERNAL MEDICINE

## 2019-09-05 PROCEDURE — 99233 PR SUBSEQUENT HOSPITAL CARE,LEVL III: ICD-10-PCS | Mod: GC,,, | Performed by: INTERNAL MEDICINE

## 2019-09-05 PROCEDURE — 36415 COLL VENOUS BLD VENIPUNCTURE: CPT

## 2019-09-05 PROCEDURE — 99232 PR SUBSEQUENT HOSPITAL CARE,LEVL II: ICD-10-PCS | Mod: ,,, | Performed by: NURSE PRACTITIONER

## 2019-09-05 PROCEDURE — 80048 BASIC METABOLIC PNL TOTAL CA: CPT

## 2019-09-05 PROCEDURE — 85007 BL SMEAR W/DIFF WBC COUNT: CPT

## 2019-09-05 PROCEDURE — 99232 SBSQ HOSP IP/OBS MODERATE 35: CPT | Mod: ,,, | Performed by: NURSE PRACTITIONER

## 2019-09-05 PROCEDURE — 99222 1ST HOSP IP/OBS MODERATE 55: CPT | Mod: ,,, | Performed by: NURSE PRACTITIONER

## 2019-09-05 PROCEDURE — 84100 ASSAY OF PHOSPHORUS: CPT

## 2019-09-05 RX ORDER — FUROSEMIDE 40 MG/1
40 TABLET ORAL 2 TIMES DAILY
Status: DISCONTINUED | OUTPATIENT
Start: 2019-09-05 | End: 2019-09-09 | Stop reason: HOSPADM

## 2019-09-05 RX ORDER — POTASSIUM CHLORIDE 20 MEQ/1
20 TABLET, EXTENDED RELEASE ORAL 2 TIMES DAILY
Status: DISCONTINUED | OUTPATIENT
Start: 2019-09-05 | End: 2019-09-09 | Stop reason: HOSPADM

## 2019-09-05 RX ORDER — FUROSEMIDE 10 MG/ML
40 INJECTION INTRAMUSCULAR; INTRAVENOUS 2 TIMES DAILY
Status: DISCONTINUED | OUTPATIENT
Start: 2019-09-05 | End: 2019-09-05

## 2019-09-05 RX ORDER — TRAMADOL HYDROCHLORIDE 50 MG/1
50 TABLET ORAL EVERY 6 HOURS PRN
Status: DISCONTINUED | OUTPATIENT
Start: 2019-09-05 | End: 2019-09-09 | Stop reason: HOSPADM

## 2019-09-05 RX ORDER — INSULIN ASPART 100 [IU]/ML
10 INJECTION, SOLUTION INTRAVENOUS; SUBCUTANEOUS
Status: DISCONTINUED | OUTPATIENT
Start: 2019-09-05 | End: 2019-09-05

## 2019-09-05 RX ORDER — OXYCODONE HYDROCHLORIDE 5 MG/1
5 TABLET ORAL EVERY 4 HOURS PRN
Status: DISCONTINUED | OUTPATIENT
Start: 2019-09-05 | End: 2019-09-05

## 2019-09-05 RX ORDER — LABETALOL HCL 20 MG/4 ML
10 SYRINGE (ML) INTRAVENOUS ONCE
Status: COMPLETED | OUTPATIENT
Start: 2019-09-05 | End: 2019-09-05

## 2019-09-05 RX ORDER — OXYCODONE HYDROCHLORIDE 5 MG/1
5 TABLET ORAL EVERY 4 HOURS PRN
Status: DISCONTINUED | OUTPATIENT
Start: 2019-09-05 | End: 2019-09-09 | Stop reason: HOSPADM

## 2019-09-05 RX ORDER — INSULIN ASPART 100 [IU]/ML
12 INJECTION, SOLUTION INTRAVENOUS; SUBCUTANEOUS
Status: DISCONTINUED | OUTPATIENT
Start: 2019-09-05 | End: 2019-09-07

## 2019-09-05 RX ORDER — METOPROLOL SUCCINATE 50 MG/1
100 TABLET, EXTENDED RELEASE ORAL DAILY
Status: DISCONTINUED | OUTPATIENT
Start: 2019-09-06 | End: 2019-09-09 | Stop reason: HOSPADM

## 2019-09-05 RX ORDER — LOSARTAN POTASSIUM 50 MG/1
100 TABLET ORAL DAILY
Status: DISCONTINUED | OUTPATIENT
Start: 2019-09-05 | End: 2019-09-09 | Stop reason: HOSPADM

## 2019-09-05 RX ORDER — LABETALOL HCL 20 MG/4 ML
10 SYRINGE (ML) INTRAVENOUS EVERY 6 HOURS
Status: DISCONTINUED | OUTPATIENT
Start: 2019-09-05 | End: 2019-09-06

## 2019-09-05 RX ADMIN — HEPARIN SODIUM 5000 UNITS: 5000 INJECTION, SOLUTION INTRAVENOUS; SUBCUTANEOUS at 02:09

## 2019-09-05 RX ADMIN — ASPIRIN 325 MG: 325 TABLET, DELAYED RELEASE ORAL at 09:09

## 2019-09-05 RX ADMIN — METOPROLOL TARTRATE 50 MG: 50 TABLET ORAL at 09:09

## 2019-09-05 RX ADMIN — FAMOTIDINE 20 MG: 20 TABLET, FILM COATED ORAL at 09:09

## 2019-09-05 RX ADMIN — HEPARIN SODIUM 5000 UNITS: 5000 INJECTION, SOLUTION INTRAVENOUS; SUBCUTANEOUS at 08:09

## 2019-09-05 RX ADMIN — ACETAMINOPHEN 1000 MG: 500 TABLET ORAL at 05:09

## 2019-09-05 RX ADMIN — INSULIN ASPART 10 UNITS: 100 INJECTION, SOLUTION INTRAVENOUS; SUBCUTANEOUS at 08:09

## 2019-09-05 RX ADMIN — INSULIN ASPART 10 UNITS: 100 INJECTION, SOLUTION INTRAVENOUS; SUBCUTANEOUS at 11:09

## 2019-09-05 RX ADMIN — INSULIN DETEMIR 10 UNITS: 100 INJECTION, SOLUTION SUBCUTANEOUS at 10:09

## 2019-09-05 RX ADMIN — HEPARIN SODIUM 5000 UNITS: 5000 INJECTION, SOLUTION INTRAVENOUS; SUBCUTANEOUS at 05:09

## 2019-09-05 RX ADMIN — FUROSEMIDE 40 MG: 40 TABLET ORAL at 05:09

## 2019-09-05 RX ADMIN — ACETAMINOPHEN 1000 MG: 500 TABLET ORAL at 12:09

## 2019-09-05 RX ADMIN — INSULIN ASPART 2 UNITS: 100 INJECTION, SOLUTION INTRAVENOUS; SUBCUTANEOUS at 11:09

## 2019-09-05 RX ADMIN — FUROSEMIDE 40 MG: 10 INJECTION, SOLUTION INTRAMUSCULAR; INTRAVENOUS at 09:09

## 2019-09-05 RX ADMIN — ATORVASTATIN CALCIUM 80 MG: 20 TABLET, FILM COATED ORAL at 09:09

## 2019-09-05 RX ADMIN — MAGNESIUM OXIDE TAB 400 MG (241.3 MG ELEMENTAL MG) 800 MG: 400 (241.3 MG) TAB at 09:09

## 2019-09-05 RX ADMIN — LABETALOL HCL IV SOLN PREFILLED SYRINGE 20 MG/4ML (5 MG/ML) 10 MG: 20/4 SOLUTION PREFILLED SYRINGE at 11:09

## 2019-09-05 RX ADMIN — OXYCODONE HYDROCHLORIDE 5 MG: 5 TABLET ORAL at 09:09

## 2019-09-05 RX ADMIN — POTASSIUM CHLORIDE 20 MEQ: 20 TABLET, EXTENDED RELEASE ORAL at 08:09

## 2019-09-05 RX ADMIN — MAGNESIUM OXIDE TAB 400 MG (241.3 MG ELEMENTAL MG) 800 MG: 400 (241.3 MG) TAB at 08:09

## 2019-09-05 RX ADMIN — LOSARTAN POTASSIUM 100 MG: 50 TABLET, FILM COATED ORAL at 09:09

## 2019-09-05 RX ADMIN — INSULIN ASPART 12 UNITS: 100 INJECTION, SOLUTION INTRAVENOUS; SUBCUTANEOUS at 05:09

## 2019-09-05 RX ADMIN — LABETALOL HCL IV SOLN PREFILLED SYRINGE 20 MG/4ML (5 MG/ML) 10 MG: 20/4 SOLUTION PREFILLED SYRINGE at 08:09

## 2019-09-05 NOTE — ASSESSMENT & PLAN NOTE
- s/p IV lasix 40mg x1 dose; recommend continue lasix 40mg PO daily on discharge  - continue losartan 100mg daily on discharge  - can change lopressor 50mg BID to toprol XL to 100mg daily   - goal SBP < 130/80  - can follow up in Cardiology clinic (with Dr. Lo Nelson) in 4-6 weeks post discharge.

## 2019-09-05 NOTE — PROGRESS NOTES
"[facility]  Endocrinology  Progress Note    Admit Date: 2019     Reason for Consult: Management of T2DM, Hyperglycemia      Surgical Procedure and Date: CABG 19     Diabetes diagnosis year:      Lab Results   Component Value Date    HGBA1C 7.5 (H) 2019       Home Diabetes Medications: Levemir 8 q HS, Glipizide 20 mg daily, Trulicity 1.5 mg SQ once weekly     How often checking glucose at home? About once daily   BG readings on regimen: 160-300  Hypoglycemia on the regimen?  no  Missed doses on regimen? no     Diabetes Complications include:     Hyperglycemia and Diabetic chronic kidney disease         Complicating diabetes co morbidities:   History of MI, KAMRAN and CKD        HPI:   Patient is a 74 y.o. female with a diagnosis of DM2, KAMRAN, CKD, and CAD, who is s/p CABG on 19. Last seen in [facility] endocrinology [facility] for DM by MAURICIO Pritchard NP on 19.  Positive family history of DM (father).  Endocrinology consulted for DM/BG management.          Interval HPI:   Overnight events: Remains in CTSU, NAEON.  BG elevated yesterday but trended down to goal overnight.  On IV antibiotics.  Possible discharge today to SNF.  Eatin%  Nausea: No  Hypoglycemia and intervention: No  Fever: No  TPN and/or TF: No    BP (!) 146/67 (BP Location: Left arm, Patient Position: Lying)   Pulse 77   Temp 98 °F (36.7 °C) (Oral)   Resp 18   Ht 5' 5" (1.651 m)   Wt 102 kg (224 lb 13.9 oz)   LMP 2001 (Approximate)   SpO2 98%   Breastfeeding? No   BMI 37.42 kg/m²      Labs Reviewed and Include    Recent Labs   Lab 19  0432   *   CALCIUM 8.7      K 4.8   CO2 24      BUN 9   CREATININE 1.4     Lab Results   Component Value Date    WBC 5.40 2019    HGB 7.4 (L) 2019    HCT 23.8 (L) 2019    MCV 88 2019     2019     No results for input(s): TSH, FREET4 in the last 168 hours.  Lab Results   Component Value Date    HGBA1C 7.5 (H) " 08/07/2019       Nutritional status:   Body mass index is 37.42 kg/m².  Lab Results   Component Value Date    ALBUMIN 2.5 (L) 08/28/2019    ALBUMIN 2.8 (L) 08/28/2019    ALBUMIN 3.3 (L) 08/07/2019     No results found for: PREALBUMIN    Estimated Creatinine Clearance: 43 mL/min (based on SCr of 1.4 mg/dL).    Accu-Checks  Recent Labs     09/03/19  0404 09/03/19  0739 09/03/19  1155 09/03/19  1654 09/03/19  2046 09/04/19  1156 09/04/19  1413 09/04/19  1645 09/04/19  2025 09/04/19  2303   POCTGLUCOSE 185* 176* 220* 169* 184* 281* 188* 191* 305* 282*       Current Medications and/or Treatments Impacting Glycemic Control  Immunotherapy:    Immunosuppressants     None        Steroids:   Hormones (From admission, onward)    None        Pressors:    Autonomic Drugs (From admission, onward)    None        Hyperglycemia/Diabetes Medications:   Antihyperglycemics (From admission, onward)    Start     Stop Route Frequency Ordered    09/04/19 1645  insulin aspart U-100 pen 9 Units      -- SubQ 3 times daily with meals 09/04/19 1536    09/03/19 0900  insulin detemir U-100 pen 10 Units      -- SubQ Daily 09/03/19 0741    09/03/19 0841  insulin aspart U-100 pen 0-5 Units      -- SubQ Before meals & nightly PRN 09/03/19 0741          ASSESSMENT and PLAN    * S/P CABG (coronary artery bypass graft)  Managed per primary team  Avoid hypoglycemia  Optimize BG control for surgical wound healing        Type 2 diabetes mellitus with stage 3 chronic kidney disease, without long-term current use of insulin  BG goal 140 - 180     Start Levemir 10 units daily in AM  Increase Novolog to 10 units with meals  Low dose correction scale  BG monitoring AC/HS    ADDENDUM: Increase Novolog to 12 units with meals    Discharge planning: TBD, if patient discharges to SNF that allows home meds - recommend patient resume home medication regimen of Levemir 8 units q HS, Glipizide 20 mg daily, Trulicity 1.5 mg SQ once weekly.  If SNF only allows insulin  therapy for DM management, recommend continuing MDI regimen while in-patient.  Will setup follow up appointment with MAURICIO Pritchard NP once patient discharges from facility.        CKD (chronic kidney disease) stage 3, GFR 30-59 ml/min  Titrate insulin slowly to avoid hypoglycemia as the risk of hypoglycemia increases with decreased creatinine clearance.  Caution with insulin stacking  Estimated Creatinine Clearance: 43 mL/min (based on SCr of 1.4 mg/dL).        Morbid obesity with body mass index (BMI) of 40.0 or higher  may contribute to insulin resistance  Body mass index is 37.42 kg/m².            Mikey Pritchard NP  Endocrinology  [facility]

## 2019-09-05 NOTE — SUBJECTIVE & OBJECTIVE
Past Medical History:   Diagnosis Date    Acid reflux     Age-related osteoporosis without current pathological fracture 2019    Cataract     CKD (chronic kidney disease) stage 3, GFR 30-59 ml/min     Dry mouth     History of TIA (transient ischemic attack) 2018    Hyperlipidemia 2014    Hypertensive heart disease with diastolic heart failure 2012    Morbid obesity with body mass index (BMI) of 40.0 or higher 2016    KAMRAN (obstructive sleep apnea)     KAMRAN on CPAP 2016    Osteoporosis without current pathological fracture 2018    Physical deconditioning 2018    Status post total left knee replacement 2017    Type 2 diabetes mellitus with stage 3 chronic kidney disease, without long-term current use of insulin 2019     Past Surgical History:   Procedure Laterality Date    ankle surgery (l)      APPENDECTOMY       SECTION      CORONARY ARTERY BYPASS GRAFT (CABG)X3 N/A 2019    Performed by Peterson Ventura MD at [facility] OR [address] [place] [address]    DILATION AND CURETTAGE OF UTERUS      KNEE ARTHROSCOPY W/ DEBRIDEMENT      KNEE SURGERY Left 2017    TKR    Left heart cath Left 2019    Performed by Ronak Gibbons MD at [facility] CATH LAB    REPLACEMENT-KNEE-TOTAL Left 2017    [facility]    SURGICAL PROCUREMENT, VEIN, ENDOSCOPIC Left 2019    Performed by Peterson Ventura MD at [facility] OR [address] [place] [address]     Review of patient's allergies indicates:   Allergen Reactions    Bactrim [sulfamethoxazole-trimethoprim] Other (See Comments)     Generic version  Sulfa makes her sick    Keflex [cephalexin] Other (See Comments)     Turns orange       Scheduled Medications:    aspirin  325 mg Oral Daily    atorvastatin  80 mg Oral Daily    famotidine  20 mg Oral Daily    furosemide  40 mg Intravenous BID    heparin (porcine)  5,000 Units Subcutaneous Q8H    insulin aspart U-100  10 Units  Subcutaneous TIDWM    insulin detemir U-100  10 Units Subcutaneous Daily    losartan  100 mg Oral Daily    magnesium oxide  800 mg Oral BID    metoprolol tartrate  50 mg Oral BID    polyethylene glycol  17 g Oral Daily    potassium chloride  20 mEq Oral BID    senna-docusate 8.6-50 mg  1 tablet Oral BID       PRN Medications: acetaminophen, Dextrose 10% Bolus, Dextrose 10% Bolus, Dextrose 10% Bolus, glucagon (human recombinant), glucose, glucose, insulin aspart U-100, ondansetron, oxyCODONE, sodium chloride 0.9%, traMADol    Family History     Problem Relation (Age of Onset)    Diabetes Father    Heart disease Mother, Father    Heart failure Mother, Father    No Known Problems Brother, Son    Stroke Father, Paternal Grandfather        Tobacco Use    Smoking status: Never Smoker    Smokeless tobacco: Never Used   Substance and Sexual Activity    Alcohol use: Yes     Alcohol/week: 0.0 oz     Comment: social drinker    Drug use: No    Sexual activity: Yes     Partners: Male     Birth control/protection: Post-menopausal     Review of Systems   Constitutional: Positive for activity change. Negative for fatigue and fever.   HENT: Negative for trouble swallowing and voice change.    Eyes: Negative for photophobia and visual disturbance.   Respiratory: Negative for cough and shortness of breath.    Cardiovascular: Positive for leg swelling. Negative for chest pain and palpitations.   Gastrointestinal: Negative for abdominal distention, nausea and vomiting.   Genitourinary: Negative for difficulty urinating and flank pain.   Musculoskeletal: Positive for gait problem. Negative for arthralgias.   Skin: Positive for wound (surgical). Negative for rash.   Neurological: Positive for weakness. Negative for facial asymmetry, speech difficulty, numbness and headaches.   Psychiatric/Behavioral: Negative for agitation and confusion.     Objective:     Vital Signs (Most Recent):  Temp: 97.9 °F (36.6 °C) (09/05/19  0858)  Pulse: 89 (09/05/19 0900)  Resp: 18 (09/05/19 0858)  BP: (!) 173/79 (09/05/19 0858)  SpO2: 96 % (09/05/19 0858)    Vital Signs (24h Range):  Temp:  [97.4 °F (36.3 °C)-98.1 °F (36.7 °C)] 97.9 °F (36.6 °C)  Pulse:  [67-92] 89  Resp:  [18] 18  SpO2:  [94 %-98 %] 96 %  BP: (119-173)/(58-79) 173/79     Body mass index is 37.42 kg/m².    Physical Exam   Constitutional: She is oriented to person, place, and time. She appears well-developed and well-nourished.   HENT:   Head: Normocephalic and atraumatic.   Eyes: Right eye exhibits no discharge. Left eye exhibits no discharge.   Neck: Neck supple.   Cardiovascular: Intact distal pulses.   BLE edema    Pulmonary/Chest: Effort normal. No respiratory distress.   Abdominal: Soft. There is no tenderness.   Musculoskeletal: She exhibits no edema or deformity.   Generalized deconditioning   Neurological: She is alert and oriented to person, place, and time. No sensory deficit. She exhibits normal muscle tone.   Follows commands    Skin: Skin is warm and dry.   Sternal incision   Psychiatric: She has a normal mood and affect. Her behavior is normal.   Vitals reviewed.    NEUROLOGICAL EXAMINATION:     MENTAL STATUS   Oriented to person, place, and time.       Diagnostic Results:   Labs: Reviewed  ECG: Reviewed  X-Ray: Reviewed

## 2019-09-05 NOTE — PLAN OF CARE
Patient remains free from falls and injuries through out shift. Patient AAOx4. Patient denies chest pain and SOB and VSS. SBP was between the 140-150's on shift. IVPB Zosyn given q8hr on shift. Patient is blood glucose checks ACHS, last . 1 unit of SSI given on shift per MAR. Per patient she is still having loose stools. Lasix 20mg IVP given on shift. IVPB Zoysn 1630 dose not given due to patient losing IV access, but 0030 schedule dose was given. Electrolyte replacement given. K4.7, Mg 1.7 and BUN/SCr 9/1.4 this morning. MSI is CDI and SRINIVASAN with sutures still intact. Contact precautions maintained. Sternal precautions teaching reinforced. Call bell is in reach of patient and patient instructed to use call bell prior to ambulating. PRN PO acetaminophen 1000mg given d/t c/o HA on shift.  POC is for patient to d/c to OSNF once medically stable. Plan of care reviewed with patient. Patient verbalizes understanding of plan.  Will continue to monitor.

## 2019-09-05 NOTE — HOSPITAL COURSE
09/04/2019: BM Tyler. Sit to stand and transfers SBA-SV.  Ambulated 2 x 100 ft SBA w/ occasional standing rest breaks 2/2 reports of fatigue.  ADLs SV.   09/05/2019: Bed mobility SBA-CGA .  Sit to stand and transfers SBA-Mod (I). Ambulated 2 x 100ft CGA-SBA.   Grooming/toileting SV-(I).  09/06/2019: Sit to stand SBA and transfers SBA.  Ambulated 125 ft CGA. LBD Tyler.

## 2019-09-05 NOTE — CONSULTS
Inpatient consult to Physical Medicine Rehab  Consult performed by: Elvia Brewer NP  Consult ordered by: Keila Warner NP  Reason for consult: assess rehab needs        Reviewed patient history and current admission.  Rehab team following.  Full consult to follow.    PAYAL Tong, FNP-C  Physical Medicine & Rehabilitation   09/05/2019

## 2019-09-05 NOTE — ANESTHESIA PROCEDURE NOTES
Peripheral IV Insertion    Diagnosis: I87.9 Disorder of vein, unspecified.    Patient location during procedure: floor  Procedure start time: 9/4/2019 8:15 PM  Timeout: 9/4/2019 8:13 PM  Procedure end time: 9/4/2019 8:20 PM    Staffing  Authorizing Provider: Kim Acosta MD  Performing Provider: Kim Acosta MD      Preanesthetic Checklist  Completed: patient identified, site marked, pre-op evaluation, timeout performed, risks and benefits discussed, monitors and equipment checked and anesthesia consent givenPeripheral IV Insertion  Skin Prep: chlorhexidine gluconate  Local Infiltration: none  Orientation: right  Location: upper arm  Catheter Size: 18 G  Catheter placement by Ultrasound guidance. Heme positive aspiration all ports.  Vessel Caliber: medium, patent, compressibility normal  Needle advanced into vessel with real time Ultrasound guidance.Insertion Attempts: 1  Assessment  Dressing: secured with tape and tegaderm  Patient: Tolerated well  Line flushed easily.

## 2019-09-05 NOTE — ASSESSMENT & PLAN NOTE
-IV Zosyn with no end date specified     Recommendations  -  Encourage mobility, OOB in chair at least 3 hours per day, and early ambulation as appropriate  -  PT/OT evaluate and treat  -  Pain management  -  Monitor for and prevent skin breakdown and pressure ulcers  · Early mobility, repositioning/weight shifting every 20-30 minutes when sitting, turn patient every 2 hours, proper mattress/overlay and chair cushioning, pressure relief/heel protector boots  -  DVT prophylaxis    -  Reviewed discharge options (IP rehab, SNF, HH therapy, and OP therapy)

## 2019-09-05 NOTE — ASSESSMENT & PLAN NOTE
DOREEN on CKD 3 s/p volume resuscitation  Strict I/O --> will need to assess UOP  qAM BMP  Lasix 40IV BID

## 2019-09-05 NOTE — SUBJECTIVE & OBJECTIVE
Interval History: Pt waiting on SNF placement. Pt wear CPAP last night and cards gave recs for HTN. Pt has c/o left chronic shoulder pain    Review of Systems   Constitution: Negative for malaise/fatigue.   Cardiovascular: Negative for chest pain, claudication, dyspnea on exertion, irregular heartbeat, leg swelling and palpitations.   Respiratory: Negative for cough and shortness of breath.    Hematologic/Lymphatic: Negative for bleeding problem.   Gastrointestinal: Negative for abdominal pain.   Genitourinary: Negative for dysuria.   Neurological: Negative for headaches and weakness.     Medications:  Continuous Infusions:  Scheduled Meds:   aspirin  325 mg Oral Daily    atorvastatin  80 mg Oral Daily    famotidine  20 mg Oral Daily    furosemide  40 mg Intravenous BID    heparin (porcine)  5,000 Units Subcutaneous Q8H    insulin aspart U-100  10 Units Subcutaneous TIDWM    insulin detemir U-100  10 Units Subcutaneous Daily    losartan  100 mg Oral Daily    magnesium oxide  800 mg Oral BID    metoprolol tartrate  50 mg Oral BID    polyethylene glycol  17 g Oral Daily    potassium chloride  20 mEq Oral BID    senna-docusate 8.6-50 mg  1 tablet Oral BID     PRN Meds:acetaminophen, Dextrose 10% Bolus, Dextrose 10% Bolus, Dextrose 10% Bolus, glucagon (human recombinant), glucose, glucose, insulin aspart U-100, ondansetron, oxyCODONE, sodium chloride 0.9%, traMADol     Objective:     Vital Signs (Most Recent):  Temp: 97.9 °F (36.6 °C) (09/05/19 0858)  Pulse: 89 (09/05/19 0900)  Resp: 18 (09/05/19 0858)  BP: (!) 173/79 (09/05/19 0858)  SpO2: 96 % (09/05/19 0858) Vital Signs (24h Range):  Temp:  [97.4 °F (36.3 °C)-98.1 °F (36.7 °C)] 97.9 °F (36.6 °C)  Pulse:  [67-92] 89  Resp:  [18] 18  SpO2:  [94 %-98 %] 96 %  BP: (119-173)/(58-79) 173/79     Weight: 102 kg (224 lb 13.9 oz)  Body mass index is 37.42 kg/m².    SpO2: 96 %  O2 Device (Oxygen Therapy): room air    Intake/Output - Last 3 Shifts       09/03 0700 -  09/04 0659 09/04 0700 - 09/05 0659 09/05 0700 - 09/06 0659    P.O. 960 0     Total Intake(mL/kg) 960 (9.4) 0 (0)     Urine (mL/kg/hr) 300 (0.1) 600 (0.2)     Stool 0      Total Output 300 600     Net +660 -600            Urine Occurrence 4 x      Stool Occurrence 3 x            Lines/Drains/Airways     Peripheral Intravenous Line                 Peripheral IV - Single Lumen 09/04/19 2015 18 G Right Upper Arm less than 1 day                Physical Exam   Constitutional: She is oriented to person, place, and time. She appears well-developed and well-nourished.   Cardiovascular: Normal rate, regular rhythm and normal heart sounds.   Pulmonary/Chest: Effort normal and breath sounds normal.   Abdominal: Soft.   Neurological: She is alert and oriented to person, place, and time.   Skin: Skin is warm, dry and intact.   Psychiatric: She has a normal mood and affect.       Significant Labs:  BMP:   Recent Labs   Lab 09/05/19  0432   *      K 4.8      CO2 24   BUN 9   CREATININE 1.4   CALCIUM 8.7   MG 1.7     CBC:   Recent Labs   Lab 09/05/19  0432   WBC 5.40   RBC 2.70*   HGB 7.4*   HCT 23.8*      MCV 88   MCH 27.4   MCHC 31.1*

## 2019-09-05 NOTE — SUBJECTIVE & OBJECTIVE
Interval History: No acute events overnight. Vitals within normal. Pt wore her CPAP overnight and there were no further spikes of elevated BP. Pt had no new complaints.    Review of Systems   Constitution: Negative for chills and decreased appetite.   Eyes: Negative for blurred vision.   Cardiovascular: Negative for chest pain, dyspnea on exertion and irregular heartbeat.   Respiratory: Negative for cough and shortness of breath.    Gastrointestinal: Negative for constipation, diarrhea, melena, nausea and vomiting.   Neurological: Negative for headaches and weakness.     Objective:     Vital Signs (Most Recent):  Temp: 97.8 °F (36.6 °C) (09/05/19 1130)  Pulse: 82 (09/05/19 1130)  Resp: 18 (09/05/19 1130)  BP: (!) 146/77 (09/05/19 1130)  SpO2: 97 % (09/05/19 1130) Vital Signs (24h Range):  Temp:  [97.6 °F (36.4 °C)-98.1 °F (36.7 °C)] 97.8 °F (36.6 °C)  Pulse:  [67-92] 82  Resp:  [18] 18  SpO2:  [96 %-98 %] 97 %  BP: (119-173)/(67-79) 146/77     Weight: 102 kg (224 lb 13.9 oz)  Body mass index is 37.42 kg/m².     SpO2: 97 %  O2 Device (Oxygen Therapy): room air      Intake/Output Summary (Last 24 hours) at 9/5/2019 1135  Last data filed at 9/5/2019 0526  Gross per 24 hour   Intake --   Output 600 ml   Net -600 ml       Lines/Drains/Airways     Peripheral Intravenous Line                 Peripheral IV - Single Lumen 09/04/19 2015 18 G Right Upper Arm less than 1 day                Physical Exam   Constitutional: She is oriented to person, place, and time. She appears well-developed and well-nourished.   HENT:   Head: Normocephalic and atraumatic.   Eyes: Pupils are equal, round, and reactive to light.   Neck: No JVD present.   Cardiovascular: Normal rate and regular rhythm.   No murmur heard.  Pulmonary/Chest: Breath sounds normal. No respiratory distress.   Abdominal: Soft. Bowel sounds are normal. She exhibits no distension.   Musculoskeletal: Normal range of motion. She exhibits edema.   Neurological: She is alert  and oriented to person, place, and time.   Skin: Skin is warm and dry.   Vitals reviewed.      Significant Labs:   BMP:   Recent Labs   Lab 09/04/19  0609 09/05/19  0432   * 183*    139   K 4.3 4.8    106   CO2 24 24   BUN 7* 9   CREATININE 1.3 1.4   CALCIUM 8.7 8.7   MG 1.6 1.7    and CMP   Recent Labs   Lab 09/04/19  0609 09/05/19  0432    139   K 4.3 4.8    106   CO2 24 24   * 183*   BUN 7* 9   CREATININE 1.3 1.4   CALCIUM 8.7 8.7   ANIONGAP 8 9   ESTGFRAFRICA 47.4* 43.3*   EGFRNONAA 41.1* 37.6*       Significant Imaging:

## 2019-09-05 NOTE — PLAN OF CARE
09/05/19 1604   Post-Acute Status   Post-Acute Authorization Placement   Post-Acute Placement Status Referrals Sent     SW followed up with [facility] and it was stated referral was not received. SW attempted to re send referral, however, the referral was located and only an H&P was needed.     Later, SW called for status and was told an additional 45 minutes were needed.    CM was notified and recommended that another referral to be sent to [facility]. Facesheet and referral is pending review. Patient made aware.     Liliya Ochoa LMSW

## 2019-09-05 NOTE — SUBJECTIVE & OBJECTIVE
"Interval HPI:   Overnight events: Remains in [facility], [place].  BG elevated yesterday but trended down to goal overnight.  On IV antibiotics.  Possible discharge today to SNF.  Eatin%  Nausea: No  Hypoglycemia and intervention: No  Fever: No  TPN and/or TF: No    BP (!) 146/67 (BP Location: Left arm, Patient Position: Lying)   Pulse 77   Temp 98 °F (36.7 °C) (Oral)   Resp 18   Ht 5' 5" (1.651 m)   Wt 102 kg (224 lb 13.9 oz)   LMP 2001 (Approximate)   SpO2 98%   Breastfeeding? No   BMI 37.42 kg/m²     Labs Reviewed and Include    Recent Labs   Lab 19  0432   *   CALCIUM 8.7      K 4.8   CO2 24      BUN 9   CREATININE 1.4     Lab Results   Component Value Date    WBC 5.40 2019    HGB 7.4 (L) 2019    HCT 23.8 (L) 2019    MCV 88 2019     2019     No results for input(s): TSH, FREET4 in the last 168 hours.  Lab Results   Component Value Date    HGBA1C 7.5 (H) 2019       Nutritional status:   Body mass index is 37.42 kg/m².  Lab Results   Component Value Date    ALBUMIN 2.5 (L) 2019    ALBUMIN 2.8 (L) 2019    ALBUMIN 3.3 (L) 2019     No results found for: PREALBUMIN    Estimated Creatinine Clearance: 43 mL/min (based on SCr of 1.4 mg/dL).    Accu-Checks  Recent Labs     19  0404 19  0739 19  1155 19  1654 19  2046 19  1156 19  1413 19  1645 19  2025 19  2303   POCTGLUCOSE 185* 176* 220* 169* 184* 281* 188* 191* 305* 282*       Current Medications and/or Treatments Impacting Glycemic Control  Immunotherapy:    Immunosuppressants     None        Steroids:   Hormones (From admission, onward)    None        Pressors:    Autonomic Drugs (From admission, onward)    None        Hyperglycemia/Diabetes Medications:   Antihyperglycemics (From admission, onward)    Start     Stop Route Frequency Ordered    19 8892  insulin aspart U-100 pen 9 Units      -- SubQ 3 " times daily with meals 09/04/19 1536    09/03/19 0900  insulin detemir U-100 pen 10 Units      -- SubQ Daily 09/03/19 0741    09/03/19 0841  insulin aspart U-100 pen 0-5 Units      -- SubQ Before meals & nightly PRN 09/03/19 0741

## 2019-09-05 NOTE — PROGRESS NOTES
[facility]  Cardiology  Progress Note    Patient Name: Kaylee Romero  MRN: [id number]  Admission Date: 8/27/2019  Hospital Length of Stay: 8 days  Code Status: Full Code   Attending Physician: Peterson Ventura MD   Primary Care Physician: Liz Roberts MD  Expected Discharge Date: 9/4/2019  Principal Problem:S/P CABG (coronary artery bypass graft)    Subjective:     Interval History: No acute events overnight. Vitals within normal. Pt wore her CPAP overnight and there were no further spikes of elevated BP. Pt had no new complaints.    Review of Systems   Constitution: Negative for chills and decreased appetite.   Eyes: Negative for blurred vision.   Cardiovascular: Negative for chest pain, dyspnea on exertion and irregular heartbeat.   Respiratory: Negative for cough and shortness of breath.    Gastrointestinal: Negative for constipation, diarrhea, melena, nausea and vomiting.   Neurological: Negative for headaches and weakness.     Objective:     Vital Signs (Most Recent):  Temp: 97.8 °F (36.6 °C) (09/05/19 1130)  Pulse: 82 (09/05/19 1130)  Resp: 18 (09/05/19 1130)  BP: (!) 146/77 (09/05/19 1130)  SpO2: 97 % (09/05/19 1130) Vital Signs (24h Range):  Temp:  [97.6 °F (36.4 °C)-98.1 °F (36.7 °C)] 97.8 °F (36.6 °C)  Pulse:  [67-92] 82  Resp:  [18] 18  SpO2:  [96 %-98 %] 97 %  BP: (119-173)/(67-79) 146/77     Weight: 102 kg (224 lb 13.9 oz)  Body mass index is 37.42 kg/m².     SpO2: 97 %  O2 Device (Oxygen Therapy): room air      Intake/Output Summary (Last 24 hours) at 9/5/2019 1135  Last data filed at 9/5/2019 0526  Gross per 24 hour   Intake --   Output 600 ml   Net -600 ml       Lines/Drains/Airways     Peripheral Intravenous Line                 Peripheral IV - Single Lumen 09/04/19 2015 18 G Right Upper Arm less than 1 day                Physical Exam   Constitutional: She is oriented to person, place, and time. She appears well-developed and well-nourished.   HENT:   Head:  Normocephalic and atraumatic.   Eyes: Pupils are equal, round, and reactive to light.   Neck: No JVD present.   Cardiovascular: Normal rate and regular rhythm.   No murmur heard.  Pulmonary/Chest: Breath sounds normal. No respiratory distress.   Abdominal: Soft. Bowel sounds are normal. She exhibits no distension.   Musculoskeletal: Normal range of motion. She exhibits edema.   Neurological: She is alert and oriented to person, place, and time.   Skin: Skin is warm and dry.   Vitals reviewed.      Significant Labs:   BMP:   Recent Labs   Lab 09/04/19  0609 09/05/19  0432   * 183*    139   K 4.3 4.8    106   CO2 24 24   BUN 7* 9   CREATININE 1.3 1.4   CALCIUM 8.7 8.7   MG 1.6 1.7    and CMP   Recent Labs   Lab 09/04/19  0609 09/05/19  0432    139   K 4.3 4.8    106   CO2 24 24   * 183*   BUN 7* 9   CREATININE 1.3 1.4   CALCIUM 8.7 8.7   ANIONGAP 8 9   ESTGFRAFRICA 47.4* 43.3*   EGFRNONAA 41.1* 37.6*       Significant Imaging:      ECHO 8/28/19:  · Increased (hyperdynamic) left ventricular systolic function. The estimated ejection fraction is 75%  · Concentric left ventricular remodeling.  · No wall motion abnormalities.  · Normal LV diastolic function.  · Normal right ventricular systolic function.  · Normal central venous pressure (3 mm Hg).  · The estimated PA systolic pressure is 17 mm Hg  · Technically poor study     ECG 8/29/19: NSR at 99 bpm, LVH     Assessment and Plan:     Brief HPI: Kaylee Romero is a 74 y.o. F with CAD s/p CABG on 8/12/19 (LIMA to LAD, SVG to OM), HTN, CKD III, obesity, KAMRAN who was admitted 8/29/19 for sepsis secondary to LLE cellulitis infection and DOREEN, for which she has been treated for with antibiotics. Cardiology has been consulted for further management of hypertension.      Hypertension  - better controlled overnight.  - s/p IV lasix 40mg x1 dose; recommend continue lasix 40mg PO daily (continue on discharge)  - continue losartan 100mg daily    - can change lopressor 50mg BID to toprol XL 100mg daily   - goal SBP < 130/80  - can follow up in Cardiology clinic (with Dr. Lo Nelson) in 4-6 weeks post discharge.    KAMRAN on CPAP  - continue use of CPAP nightly.    Coronary artery disease   s/p CABG LIMA to LAD, SVG to OM  - TTE 8/28/19: EF 75%, PA pressure 17  - continue aspirin, atorva, losartan, lopresso  - manage per primary team    VTE Risk Mitigation (From admission, onward)        Ordered     heparin (porcine) injection 5,000 Units  Every 8 hours      08/29/19 0841     Place sequential compression device  Until discontinued      08/28/19 0525     IP VTE HIGH RISK PATIENT  Once      08/28/19 0525        Thank you for your consult. We will sign off now. Please don't hesitate to call with questions.     Patient seen and plan of care discussed with Dr. Orellana.    Lo Nelson MD  Cardiology, PGY IV  Pager: [phone]  [facility]

## 2019-09-05 NOTE — PLAN OF CARE
Problem: Occupational Therapy Goal  Goal: Occupational Therapy Goal  Goals to be met by: 10/1/2019    Patient will increase functional independence with ADLs by performing:    Using sock aid for donning, and dressing stick for doffing socks LE Dressing with Set-up Assistance.  MET 9/3 POWER  Grooming while standing at sink with Set-up Assistance.   Toileting from toilet with Modified [place] and Assistive Devices as needed for hygiene and clothing management.   Bathing from  standing at sink with Modified [place].  Supine to sit with Set-up Assistance while observing sternal precautions.  Toilet transfer to toilet with Modified [place]. MET 9/3 POWER      Outcome: Ongoing (interventions implemented as appropriate)  Continue POC     [place] River OTR/L  Pager: [phone]  9/5/2019

## 2019-09-05 NOTE — PT/OT/SLP PROGRESS
Occupational Therapy   Treatment    Name: Kaylee Romero  MRN: [id number]  Admitting Diagnosis:  S/P CABG (coronary artery bypass graft)       Recommendations:     Discharge Recommendations: rehabilitation facility(may progress to HH)  Discharge Equipment Recommendations:  none  Barriers to discharge:  None    Assessment:     Kaylee Romero is a 74 y.o. female with a medical diagnosis of S/P CABG (coronary artery bypass graft).  She presents with performance deficits affecting function are impaired endurance, impaired functional mobilty, gait instability, impaired cardiopulmonary response to activity, pain, impaired balance, impaired self care skills. Pt tolerated session well this date and is progressing well towards all goals. Pt overall performed bed mobility, sit <>stand transfer, toilet transfer, and functional mobility within hallway with SBA. At this time, OT recommending IP Rehab but pt may be more appropriate for HH continuing improved functional mobility, transfers, and ADLs. Pt would benefit from continued skilled acute OT services in order to maximize independence and safety with ADLs and functional mobility to ensure safe return to PLOF in the least restrictive environment.    Rehab Prognosis:  Good; patient would benefit from acute skilled OT services to address these deficits and reach maximum level of function.       Plan:     Patient to be seen 4 x/week to address the above listed problems via self-care/home management, therapeutic activities, therapeutic exercises  · Plan of Care Expires: 10/01/19  · Plan of Care Reviewed with: patient    Subjective     Pain/Comfort:  · Pain Rating 1: (Pt reported L shoulder pain but did not quantify )  · Pain Addressed 1: Distraction    Objective:     Communicated with: RN prior to session.  Patient found HOB elevated with peripheral IV, telemetry upon OT entry to room. Pt agreeable to therapy session.     General Precautions: Standard, fall, sternal    Orthopedic Precautions:N/A   Braces: N/A     Occupational Performance:     Bed Mobility:    · Patient completed Scooting/Bridging with stand by assistance  · Patient completed Supine to Sit with contact guard assistance and HOB elevated      Functional Mobility/Transfers:  · Patient completed Sit <> Stand Transfer with stand by assistance  with  no assistive device   · Patient completed Toilet Transfer functional ambulation with step transfer  technique with modified independence with  grab bars  · Functional Mobility: Pt engaging in functional mobility to simulate household.community distances distances approx 2 x 100ft  with CGA initially progressing to SBA using no AD in order to maximize functional activity tolerance and standing balance required for engagement in occupations of choice.   · Pt with slight fatigue noted but has previously performed self-care tasks standing at the sink prior to mobility   · Pt required few standing rest breaks with no sitting rest breaks required     Activities of Daily Living:  · Grooming: supervision pt performed hand hygiene standing at the sink   · Toileting: independence pt was able to manage clothing and perform hygien independently       [facility] 6 Click ADL: 21    Treatment & Education:  - Pt educated on role of OT, POC, and goals for therapy.   - MD present during part of OT session     - Patient and family aware of patient's deficits and therapy progression.   - L UE elevated using pillow  and applied ice for inflammation reduction  with barrier between skin   - Educated pt on being appropriate to transfer with nsg and PCT with supervision x 1 person assist.  - Time provided for therapeutic counseling and discussion of health disposition.   - Importance of OOB ax's with staff member assistance and sitting OOB majority of day.   - Pt completed ADLs and functional mobility for treatment session as noted above   - Pt verbalized understanding. Pt expressed no further  concerns/questions.  - whiteboard updated     Patient left up in chair with all lines intact, call button in reach, RN  notified and   presentEducation:      GOALS:   Multidisciplinary Problems     Occupational Therapy Goals        Problem: Occupational Therapy Goal    Goal Priority Disciplines Outcome Interventions   Occupational Therapy Goal     OT, PT/OT Ongoing (interventions implemented as appropriate)    Description:  Goals to be met by: 10/1/2019    Patient will increase functional independence with ADLs by performing:    Using sock aid for donning, and dressing stick for doffing socks LE Dressing with Set-up Assistance.  MET 9/3 POWER  Grooming while standing at sink with Set-up Assistance.   Toileting from toilet with Modified [place] and Assistive Devices as needed for hygiene and clothing management.   Bathing from  standing at sink with Modified [place].  Supine to sit with Set-up Assistance while observing sternal precautions.  Toilet transfer to toilet with Modified [place]. MET 9/3 POWER                       Time Tracking:     OT Date of Treatment: 09/05/19  OT Start Time: 1052  OT Stop Time: 1130  OT Total Time (min): 38 min    Billable Minutes:Self Care/Home Management 15  Therapeutic Activity 23    [place]ly River OT  9/5/2019

## 2019-09-05 NOTE — ASSESSMENT & PLAN NOTE
BG goal 140 - 180     Start Levemir 10 units daily in AM  Increase Novolog to 10 units with meals  Low dose correction scale  BG monitoring AC/HS    ADDENDUM: Increase Novolog to 12 units with meals    Discharge planning: TBD, if patient discharges to SNF that allows home meds - recommend patient resume home medication regimen of Levemir 8 units q HS, Glipizide 20 mg daily, Trulicity 1.5 mg SQ once weekly.  If SNF only allows insulin therapy for DM management, recommend continuing MDI regimen while in-patient.  Will setup follow up appointment with MAURICIO Pritchard NP once patient discharges from facility.

## 2019-09-05 NOTE — ASSESSMENT & PLAN NOTE
Titrate insulin slowly to avoid hypoglycemia as the risk of hypoglycemia increases with decreased creatinine clearance.  Caution with insulin stacking  Estimated Creatinine Clearance: 43 mL/min (based on SCr of 1.4 mg/dL).

## 2019-09-05 NOTE — PROGRESS NOTES
[facility]  Cardiothoracic Surgery  Progress Note    Patient Name: Kaylee Romero  MRN: [id number]  Admission Date: 8/27/2019  Hospital Length of Stay: 8 days  Code Status: Full Code   Attending Physician: Peterson Ventura MD   Referring Provider: Self, Aaareferral  Principal Problem:S/P CABG (coronary artery bypass graft)      Subjective:     Post-Op Info:  * No surgery found *         Interval History: Pt waiting on SNF placement. Pt wear CPAP last night and cards gave recs for HTN. Pt has c/o left chronic shoulder pain    Review of Systems   Constitution: Negative for malaise/fatigue.   Cardiovascular: Negative for chest pain, claudication, dyspnea on exertion, irregular heartbeat, leg swelling and palpitations.   Respiratory: Negative for cough and shortness of breath.    Hematologic/Lymphatic: Negative for bleeding problem.   Gastrointestinal: Negative for abdominal pain.   Genitourinary: Negative for dysuria.   Neurological: Negative for headaches and weakness.     Medications:  Continuous Infusions:  Scheduled Meds:   aspirin  325 mg Oral Daily    atorvastatin  80 mg Oral Daily    famotidine  20 mg Oral Daily    furosemide  40 mg Intravenous BID    heparin (porcine)  5,000 Units Subcutaneous Q8H    insulin aspart U-100  10 Units Subcutaneous TIDWM    insulin detemir U-100  10 Units Subcutaneous Daily    losartan  100 mg Oral Daily    magnesium oxide  800 mg Oral BID    metoprolol tartrate  50 mg Oral BID    polyethylene glycol  17 g Oral Daily    potassium chloride  20 mEq Oral BID    senna-docusate 8.6-50 mg  1 tablet Oral BID     PRN Meds:acetaminophen, Dextrose 10% Bolus, Dextrose 10% Bolus, Dextrose 10% Bolus, glucagon (human recombinant), glucose, glucose, insulin aspart U-100, ondansetron, oxyCODONE, sodium chloride 0.9%, traMADol     Objective:     Vital Signs (Most Recent):  Temp: 97.9 °F (36.6 °C) (09/05/19 0858)  Pulse: 89 (09/05/19 0900)  Resp: 18 (09/05/19  0858)  BP: (!) 173/79 (09/05/19 0858)  SpO2: 96 % (09/05/19 0858) Vital Signs (24h Range):  Temp:  [97.4 °F (36.3 °C)-98.1 °F (36.7 °C)] 97.9 °F (36.6 °C)  Pulse:  [67-92] 89  Resp:  [18] 18  SpO2:  [94 %-98 %] 96 %  BP: (119-173)/(58-79) 173/79     Weight: 102 kg (224 lb 13.9 oz)  Body mass index is 37.42 kg/m².    SpO2: 96 %  O2 Device (Oxygen Therapy): room air    Intake/Output - Last 3 Shifts       09/03 0700 - 09/04 0659 09/04 0700 - 09/05 0659 09/05 0700 - 09/06 0659    P.O. 960 0     Total Intake(mL/kg) 960 (9.4) 0 (0)     Urine (mL/kg/hr) 300 (0.1) 600 (0.2)     Stool 0      Total Output 300 600     Net +660 -600            Urine Occurrence 4 x      Stool Occurrence 3 x            Lines/Drains/Airways     Peripheral Intravenous Line                 Peripheral IV - Single Lumen 09/04/19 2015 18 G Right Upper Arm less than 1 day                Physical Exam   Constitutional: She is oriented to person, place, and time. She appears well-developed and well-nourished.   Cardiovascular: Normal rate, regular rhythm and normal heart sounds.   Pulmonary/Chest: Effort normal and breath sounds normal.   Abdominal: Soft.   Neurological: She is alert and oriented to person, place, and time.   Skin: Skin is warm, dry and intact.   Psychiatric: She has a normal mood and affect.       Significant Labs:  BMP:   Recent Labs   Lab 09/05/19  0432   *      K 4.8      CO2 24   BUN 9   CREATININE 1.4   CALCIUM 8.7   MG 1.7     CBC:   Recent Labs   Lab 09/05/19  0432   WBC 5.40   RBC 2.70*   HGB 7.4*   HCT 23.8*      MCV 88   MCH 27.4   MCHC 31.1*           Assessment/Plan:     * S/P CABG (coronary artery bypass graft)  Sternal Precautions  PT/OT  Ambulate  ASA  Metop    Acute blood loss anemia  H/H stable  CBC daily       Type 2 diabetes mellitus with stage 3 chronic kidney disease, without long-term current use of insulin  Endocrine following    Physical deconditioning  PT/OT   Planning for SNF placement      CKD (chronic kidney disease) stage 3, GFR 30-59 ml/min  DOREEN on CKD 3 s/p volume resuscitation  Strict I/O --> will need to assess UOP  qAM BMP  Lasix 40IV BID     Morbid obesity with body mass index (BMI) of 40.0 or higher  PT/OT   Cardiac diet     Hypertension  Imdur d/c  Losartan 100 mg daily   Cards consulted; appreciate burke Warner NP  Cardiothoracic Surgery  [facility]

## 2019-09-06 LAB
ANION GAP SERPL CALC-SCNC: 6 MMOL/L (ref 8–16)
BASOPHILS # BLD AUTO: 0.03 K/UL (ref 0–0.2)
BASOPHILS NFR BLD: 0.5 % (ref 0–1.9)
BUN SERPL-MCNC: 11 MG/DL (ref 8–23)
CALCIUM SERPL-MCNC: 9.2 MG/DL (ref 8.7–10.5)
CHLORIDE SERPL-SCNC: 103 MMOL/L (ref 95–110)
CO2 SERPL-SCNC: 28 MMOL/L (ref 23–29)
CREAT SERPL-MCNC: 1.3 MG/DL (ref 0.5–1.4)
DIFFERENTIAL METHOD: ABNORMAL
EOSINOPHIL # BLD AUTO: 0.2 K/UL (ref 0–0.5)
EOSINOPHIL NFR BLD: 4 % (ref 0–8)
ERYTHROCYTE [DISTWIDTH] IN BLOOD BY AUTOMATED COUNT: 17.4 % (ref 11.5–14.5)
EST. GFR  (AFRICAN AMERICAN): 47.4 ML/MIN/1.73 M^2
EST. GFR  (NON AFRICAN AMERICAN): 41.1 ML/MIN/1.73 M^2
GLUCOSE SERPL-MCNC: 154 MG/DL (ref 70–110)
HCT VFR BLD AUTO: 24.7 % (ref 37–48.5)
HGB BLD-MCNC: 7.7 G/DL (ref 12–16)
IMM GRANULOCYTES # BLD AUTO: 0.26 K/UL (ref 0–0.04)
IMM GRANULOCYTES NFR BLD AUTO: 4.4 % (ref 0–0.5)
LYMPHOCYTES # BLD AUTO: 1.5 K/UL (ref 1–4.8)
LYMPHOCYTES NFR BLD: 25 % (ref 18–48)
MCH RBC QN AUTO: 27.5 PG (ref 27–31)
MCHC RBC AUTO-ENTMCNC: 31.2 G/DL (ref 32–36)
MCV RBC AUTO: 88 FL (ref 82–98)
MONOCYTES # BLD AUTO: 0.5 K/UL (ref 0.3–1)
MONOCYTES NFR BLD: 7.6 % (ref 4–15)
NEUTROPHILS # BLD AUTO: 3.5 K/UL (ref 1.8–7.7)
NEUTROPHILS NFR BLD: 58.5 % (ref 38–73)
NRBC BLD-RTO: 0 /100 WBC
PLATELET # BLD AUTO: 264 K/UL (ref 150–350)
PMV BLD AUTO: 10.7 FL (ref 9.2–12.9)
POCT GLUCOSE: 149 MG/DL (ref 70–110)
POCT GLUCOSE: 163 MG/DL (ref 70–110)
POCT GLUCOSE: 191 MG/DL (ref 70–110)
POCT GLUCOSE: 206 MG/DL (ref 70–110)
POTASSIUM SERPL-SCNC: 3.9 MMOL/L (ref 3.5–5.1)
RBC # BLD AUTO: 2.8 M/UL (ref 4–5.4)
SODIUM SERPL-SCNC: 137 MMOL/L (ref 136–145)
TB INDURATION 48 - 72 HR READ: 0 MM
WBC # BLD AUTO: 5.95 K/UL (ref 3.9–12.7)

## 2019-09-06 PROCEDURE — 85025 COMPLETE CBC W/AUTO DIFF WBC: CPT

## 2019-09-06 PROCEDURE — 97530 THERAPEUTIC ACTIVITIES: CPT

## 2019-09-06 PROCEDURE — 99233 PR SUBSEQUENT HOSPITAL CARE,LEVL III: ICD-10-PCS | Mod: ,,, | Performed by: INTERNAL MEDICINE

## 2019-09-06 PROCEDURE — 80048 BASIC METABOLIC PNL TOTAL CA: CPT

## 2019-09-06 PROCEDURE — 99233 SBSQ HOSP IP/OBS HIGH 50: CPT | Mod: ,,, | Performed by: INTERNAL MEDICINE

## 2019-09-06 PROCEDURE — 36415 COLL VENOUS BLD VENIPUNCTURE: CPT

## 2019-09-06 PROCEDURE — 20600001 HC STEP DOWN PRIVATE ROOM

## 2019-09-06 PROCEDURE — 99232 SBSQ HOSP IP/OBS MODERATE 35: CPT | Mod: ,,, | Performed by: NURSE PRACTITIONER

## 2019-09-06 PROCEDURE — 97116 GAIT TRAINING THERAPY: CPT

## 2019-09-06 PROCEDURE — 97535 SELF CARE MNGMENT TRAINING: CPT

## 2019-09-06 PROCEDURE — 25000003 PHARM REV CODE 250: Performed by: STUDENT IN AN ORGANIZED HEALTH CARE EDUCATION/TRAINING PROGRAM

## 2019-09-06 PROCEDURE — 99232 PR SUBSEQUENT HOSPITAL CARE,LEVL II: ICD-10-PCS | Mod: ,,, | Performed by: NURSE PRACTITIONER

## 2019-09-06 PROCEDURE — 63600175 PHARM REV CODE 636 W HCPCS: Performed by: STUDENT IN AN ORGANIZED HEALTH CARE EDUCATION/TRAINING PROGRAM

## 2019-09-06 PROCEDURE — 25000003 PHARM REV CODE 250: Performed by: NURSE PRACTITIONER

## 2019-09-06 RX ORDER — NAPROXEN 500 MG/1
500 TABLET ORAL EVERY 12 HOURS PRN
Status: DISCONTINUED | OUTPATIENT
Start: 2019-09-06 | End: 2019-09-09 | Stop reason: HOSPADM

## 2019-09-06 RX ORDER — LABETALOL HCL 20 MG/4 ML
10 SYRINGE (ML) INTRAVENOUS EVERY 6 HOURS PRN
Status: DISCONTINUED | OUTPATIENT
Start: 2019-09-06 | End: 2019-09-09 | Stop reason: HOSPADM

## 2019-09-06 RX ORDER — AMLODIPINE BESYLATE 5 MG/1
5 TABLET ORAL DAILY
Status: DISCONTINUED | OUTPATIENT
Start: 2019-09-06 | End: 2019-09-07

## 2019-09-06 RX ORDER — METOPROLOL SUCCINATE 50 MG/1
100 TABLET, EXTENDED RELEASE ORAL ONCE
Status: COMPLETED | OUTPATIENT
Start: 2019-09-06 | End: 2019-09-06

## 2019-09-06 RX ADMIN — INSULIN ASPART 2 UNITS: 100 INJECTION, SOLUTION INTRAVENOUS; SUBCUTANEOUS at 11:09

## 2019-09-06 RX ADMIN — METOPROLOL SUCCINATE 100 MG: 50 TABLET, EXTENDED RELEASE ORAL at 03:09

## 2019-09-06 RX ADMIN — AMLODIPINE BESYLATE 5 MG: 5 TABLET ORAL at 09:09

## 2019-09-06 RX ADMIN — ACETAMINOPHEN 1000 MG: 500 TABLET ORAL at 10:09

## 2019-09-06 RX ADMIN — POTASSIUM CHLORIDE 20 MEQ: 20 TABLET, EXTENDED RELEASE ORAL at 09:09

## 2019-09-06 RX ADMIN — ASPIRIN 325 MG: 325 TABLET, DELAYED RELEASE ORAL at 09:09

## 2019-09-06 RX ADMIN — INSULIN DETEMIR 10 UNITS: 100 INJECTION, SOLUTION SUBCUTANEOUS at 09:09

## 2019-09-06 RX ADMIN — INSULIN ASPART 12 UNITS: 100 INJECTION, SOLUTION INTRAVENOUS; SUBCUTANEOUS at 04:09

## 2019-09-06 RX ADMIN — MAGNESIUM OXIDE TAB 400 MG (241.3 MG ELEMENTAL MG) 800 MG: 400 (241.3 MG) TAB at 09:09

## 2019-09-06 RX ADMIN — ATORVASTATIN CALCIUM 80 MG: 20 TABLET, FILM COATED ORAL at 09:09

## 2019-09-06 RX ADMIN — INSULIN ASPART 12 UNITS: 100 INJECTION, SOLUTION INTRAVENOUS; SUBCUTANEOUS at 08:09

## 2019-09-06 RX ADMIN — FUROSEMIDE 40 MG: 40 TABLET ORAL at 05:09

## 2019-09-06 RX ADMIN — FUROSEMIDE 40 MG: 40 TABLET ORAL at 09:09

## 2019-09-06 RX ADMIN — HEPARIN SODIUM 5000 UNITS: 5000 INJECTION, SOLUTION INTRAVENOUS; SUBCUTANEOUS at 09:09

## 2019-09-06 RX ADMIN — INSULIN ASPART 12 UNITS: 100 INJECTION, SOLUTION INTRAVENOUS; SUBCUTANEOUS at 11:09

## 2019-09-06 RX ADMIN — HEPARIN SODIUM 5000 UNITS: 5000 INJECTION, SOLUTION INTRAVENOUS; SUBCUTANEOUS at 02:09

## 2019-09-06 RX ADMIN — ACETAMINOPHEN 1000 MG: 500 TABLET ORAL at 12:09

## 2019-09-06 RX ADMIN — FAMOTIDINE 20 MG: 20 TABLET, FILM COATED ORAL at 09:09

## 2019-09-06 RX ADMIN — NAPROXEN 500 MG: 500 TABLET ORAL at 09:09

## 2019-09-06 RX ADMIN — HEPARIN SODIUM 5000 UNITS: 5000 INJECTION, SOLUTION INTRAVENOUS; SUBCUTANEOUS at 05:09

## 2019-09-06 RX ADMIN — LOSARTAN POTASSIUM 100 MG: 50 TABLET, FILM COATED ORAL at 09:09

## 2019-09-06 NOTE — PLAN OF CARE
[facility]     Department of Hospital Medicine     [address] [place] [address]     [phone] after hours  [phone] fax                                        FACILITY TRANSFER ORDERS     Patient Name: Kaylee Romero  YOB: [birth year]    Admit to:  [facility]     Diagnoses:  Active Hospital Problems    Diagnosis  POA    *S/P CABG (coronary artery bypass graft) [Z95.1]  Not Applicable     Priority: 1 - High    Acute blood loss anemia [D62]  Yes    Type 2 diabetes mellitus with stage 3 chronic kidney disease, without long-term current use of insulin [E11.22, N18.3]  Yes    Physical deconditioning [R53.81]  Yes    CKD (chronic kidney disease) stage 3, GFR 30-59 ml/min [N18.3]  Yes    KAMRAN on CPAP [G47.33, Z99.89]  Not Applicable    Morbid obesity with body mass index (BMI) of 40.0 or higher [E66.01]  Yes    Hypertension [I10]  Yes      Resolved Hospital Problems    Diagnosis Date Resolved POA    Sepsis [A41.9] 09/03/2019 Yes    Sepsis [A41.9] 08/29/2019 Yes    Hypophosphatemia [E83.39] 09/03/2019 Yes       Vital Signs: Routine.    Allergies:  Review of patient's allergies indicates:   Allergen Reactions    Bactrim [sulfamethoxazole-trimethoprim] Other (See Comments)     Generic version  Sulfa makes her sick    Keflex [cephalexin] Other (See Comments)     Turns orange       Diet: diabetic diet: 2000 calorie     Acitivities: activity as tolerated. No lifting more than 5 lbs and no pushing or pulling with arms.    Nursing: Clean incision daily with soap and water, pat dry then leave open to air.     Labs: BMP, CBC daily for 3 days    CONSULTS:        Physical Therapy to evaluate and treat and please eval and treat chronic left shoulder pain.      Occupational Therapy to evaluate and treat      DIABETES CARE:    SN to perform and educate Diabetic management with blood glucose monitoring:      Fingerstick blood sugar AC  and HS     Fingerstick blood sugar every 6 hours if patient is unable to eat    Report CBG < 60 or > 350 to physician.                                          Insulin Sliding Scale          Glucose  Novolog Insulin Subcutaneous        0 - 60   Orange juice or glucose tablet, hold insulin      No insulin   201-250  2 units   251-300  4 units   301-350  6 units   351-400  8 units   >400   10 units then call physician    Medications:      Kaylee Romero   Home Medication Instructions NICOLA:[id number]    Printed on:09/06/19 1413   Medication Information                      aspirin (ECOTRIN) 325 MG EC tablet  Take 1 tablet (325 mg total) by mouth once daily.             atorvastatin (LIPITOR) 80 MG tablet  Take 1 tablet (80 mg total) by mouth once daily.             Amlodipine (Norvasc) 5 mg by mouth daily.              Famotidine 20 mg by mouth daily.   furosemide (LASIX) 40 MG tablet  Take 1 tablet (40 mg total) by mouth daily.           insulin aspart U-100 (NOVOLOG FLEXPEN U-100 INSULIN) 100 unit/mL (3 mL) InPn pen  Inject 12 Units into the skin 3 (three) times daily with meals.             insulin detemir U-100 (LEVEMIR FLEXTOUCH) 100 unit/mL (3 mL) SubQ InPn pen  Inject 10 Units into the skin every evening.           losartan (COZAAR) 100 MG tablet  Take 1 tablet (100 mg total) by mouth once daily.             metoprolol succinate (Toprol XL) 100 mg by mouth              multivitamin (ONE DAILY MULTIVITAMIN) per tablet  Take 1 tablet by mouth once daily.             Potassium chloride 20 meq by mouth daily.                                                                                                                           _________________________________  Keila Warner NP  09/06/2019

## 2019-09-06 NOTE — CARE UPDATE
BG goal 140-180    Remains in CTSU, NAEON  BG much better controlled with adjustments to MDI regimen  Plan:    Continue Levemir 10 units daily in AM  Continue Novolog 12 units with meals  Low dose correction scale  BG monitoring AC/HS    Discharge planning: TBD, if patient discharges to SNF that allows home meds - recommend patient resume home medication regimen of Levemir 8 units q HS, Glipizide 20 mg daily, Trulicity 1.5 mg SQ once weekly.  If SNF only allows insulin therapy for DM management, recommend continuing MDI regimen while in-patient.  Will setup follow up appointment with MAURICIO Pritchard NP once patient discharges from facility.    Endocrine to continue to follow    ** Please call Endocrine for any BG related issues **

## 2019-09-06 NOTE — PLAN OF CARE
"   09/06/19 1341   Post-Acute Status   Post-Acute Authorization Placement   Post-Acute Placement Status Pending Post-Acute Clinical Review     SW contacted [facility] for updates. It was reported that MD was review referral and a follow up with be provided later.    [facility] called and stated patient was approved. However, PT/OT updated patient's recs to inpatient rehab    [facility] reported patient has been approved for SNF and if inpatient rehab is recommended patient's referral will be submitted for further review. CM made aware.     Auth may not be provided until Monday per [facility].     Liliya Ochoa LMSW (Ronnie)    "

## 2019-09-06 NOTE — PLAN OF CARE
Problem: Adult Inpatient Plan of Care  Goal: Plan of Care Review  Outcome: Ongoing (interventions implemented as appropriate)  Pt is A, A, Ox4. Calm, cooperative. Free of falls, trauma, and injuries. Skin intact. Pt educated on treatment plan. Pt demonstrates and verbalizes understanding. VSS. BG monitored ACHS. Pending rehab placement. Plan of care reviewed with pt.

## 2019-09-06 NOTE — ASSESSMENT & PLAN NOTE
- s/p CABG LIMA to LAD, SVG to OM  - TTE 8/28/19: EF 75%, PA pressure 17  - continue aspirin, atorva, losartan, lopresso  - manage per primary team

## 2019-09-06 NOTE — PROGRESS NOTES
[facility]  Cardiology  Progress Note    Patient Name: Kaylee Romero  MRN: [id number]  Admission Date: 8/27/2019  Hospital Length of Stay: 9 days  Code Status: Full Code   Attending Physician: Peterson Ventura MD   Primary Care Physician: Liz Roberts MD  Expected Discharge Date: 9/4/2019  Principal Problem:S/P CABG (coronary artery bypass graft)    Subjective:     Interval History: No acute events overnight. Elevated BP overnight with range 122-173/60-79. Pt asymptomatic.    Review of Systems   Constitution: Negative for chills and decreased appetite.   Eyes: Negative for blurred vision.   Cardiovascular: Negative for chest pain, dyspnea on exertion and irregular heartbeat.   Respiratory: Negative for cough and shortness of breath.    Gastrointestinal: Negative for constipation, diarrhea, melena, nausea and vomiting.   Neurological: Negative for headaches and weakness.     Objective:     Vital Signs (Most Recent):  Temp: 97.8 °F (36.6 °C) (09/06/19 0810)  Pulse: 75 (09/06/19 1000)  Resp: 18 (09/06/19 0810)  BP: (!) 183/75 (09/06/19 0930)  SpO2: 98 % (09/06/19 0810) Vital Signs (24h Range):  Temp:  [97.8 °F (36.6 °C)-98.4 °F (36.9 °C)] 97.8 °F (36.6 °C)  Pulse:  [66-85] 75  Resp:  [16-18] 18  SpO2:  [94 %-98 %] 98 %  BP: (122-183)/(68-82) 183/75     Weight: 75.2 kg (165 lb 12.6 oz)  Body mass index is 27.59 kg/m².     SpO2: 98 %  O2 Device (Oxygen Therapy): room air      Intake/Output Summary (Last 24 hours) at 9/6/2019 1100  Last data filed at 9/6/2019 0500  Gross per 24 hour   Intake 480 ml   Output 1801 ml   Net -1321 ml       Lines/Drains/Airways     Peripheral Intravenous Line                 Peripheral IV - Single Lumen 09/04/19 2015 18 G Right Upper Arm 1 day              Physical Exam   Constitutional: She is oriented to person, place, and time. She appears well-developed and well-nourished.   HENT:   Head: Normocephalic and atraumatic.   Eyes: Pupils are equal, round, and  reactive to light.   Neck: No JVD present.   Cardiovascular: Normal rate and regular rhythm.   No murmur heard.  Pulmonary/Chest: Breath sounds normal. No respiratory distress.   Abdominal: Soft. Bowel sounds are normal. She exhibits no distension.   Musculoskeletal: Normal range of motion. She exhibits edema.   Neurological: She is alert and oriented to person, place, and time.   Skin: Skin is warm and dry.   Vitals reviewed.      Significant Labs:   CMP   Recent Labs   Lab 09/05/19  0432 09/06/19  0713    137   K 4.8 3.9    103   CO2 24 28   * 154*   BUN 9 11   CREATININE 1.4 1.3   CALCIUM 8.7 9.2   ANIONGAP 9 6*   ESTGFRAFRICA 43.3* 47.4*   EGFRNONAA 37.6* 41.1*    and CBC   Recent Labs   Lab 09/05/19 0432 09/06/19  0713   WBC 5.40 5.95   HGB 7.4* 7.7*   HCT 23.8* 24.7*    264       Significant Imaging:      ECHO 8/28/19:  · Increased (hyperdynamic) left ventricular systolic function. The estimated ejection fraction is 75%  · Concentric left ventricular remodeling.  · No wall motion abnormalities.  · Normal LV diastolic function.  · Normal right ventricular systolic function.  · Normal central venous pressure (3 mm Hg).  · The estimated PA systolic pressure is 17 mm Hg  · Technically poor study     ECG 8/29/19: NSR at 99 bpm, LVH    Assessment and Plan:     Brief HPI: Kaylee Romero is a 74 y.o. F with CAD s/p CABG on 8/12/19 (LIMA to LAD, SVG to OM), HTN, CKD III, obesity, KAMRAN who was admitted 8/29/19 for sepsis secondary to LLE cellulitis infection and DOREEN, for which she has been treated for with antibiotics. Cardiology has been consulted for further management of hypertension.     Hypertension  - s/p IV lasix 40mg x1 dose; recommend continue lasix 40mg PO daily on discharge  - continue losartan 100mg daily on discharge  - continue toprol XL to 100mg daily   - agree with amlodipine 5mg daily  - goal SBP < 130/80  - can follow up in Cardiology clinic in 4-6 weeks post  discharge.    KAMRAN on CPAP  - continue use of CPAP nightly.    Coronary artery disease.  S/P CABG (coronary artery bypass graft)  - s/p CABG LIMA to LAD, SVG to OM  - TTE 8/28/19: EF 75%, PA pressure 17  - continue aspirin, atorva, losartan, lopresso  - manage per primary team      VTE Risk Mitigation (From admission, onward)        Ordered     heparin (porcine) injection 5,000 Units  Every 8 hours      08/29/19 0841     Place sequential compression device  Until discontinued      08/28/19 0525     IP VTE HIGH RISK PATIENT  Once      08/28/19 0525        Patient seen and plan of care discussed with Dr. Henderson.    Lo Nelson MD  Cardiology  [facility]

## 2019-09-06 NOTE — SUBJECTIVE & OBJECTIVE
Interval History: No acute events overnight. Elevated BP overnight with range 122-173/60-79. Pt asymptomatic.    Review of Systems   Constitution: Negative for chills and decreased appetite.   Eyes: Negative for blurred vision.   Cardiovascular: Negative for chest pain, dyspnea on exertion and irregular heartbeat.   Respiratory: Negative for cough and shortness of breath.    Gastrointestinal: Negative for constipation, diarrhea, melena, nausea and vomiting.   Neurological: Negative for headaches and weakness.     Objective:     Vital Signs (Most Recent):  Temp: 97.8 °F (36.6 °C) (09/06/19 0810)  Pulse: 75 (09/06/19 1000)  Resp: 18 (09/06/19 0810)  BP: (!) 183/75 (09/06/19 0930)  SpO2: 98 % (09/06/19 0810) Vital Signs (24h Range):  Temp:  [97.8 °F (36.6 °C)-98.4 °F (36.9 °C)] 97.8 °F (36.6 °C)  Pulse:  [66-85] 75  Resp:  [16-18] 18  SpO2:  [94 %-98 %] 98 %  BP: (122-183)/(68-82) 183/75     Weight: 75.2 kg (165 lb 12.6 oz)  Body mass index is 27.59 kg/m².     SpO2: 98 %  O2 Device (Oxygen Therapy): room air      Intake/Output Summary (Last 24 hours) at 9/6/2019 1100  Last data filed at 9/6/2019 0500  Gross per 24 hour   Intake 480 ml   Output 1801 ml   Net -1321 ml       Lines/Drains/Airways     Peripheral Intravenous Line                 Peripheral IV - Single Lumen 09/04/19 2015 18 G Right Upper Arm 1 day              Physical Exam   Constitutional: She is oriented to person, place, and time. She appears well-developed and well-nourished.   HENT:   Head: Normocephalic and atraumatic.   Eyes: Pupils are equal, round, and reactive to light.   Neck: No JVD present.   Cardiovascular: Normal rate and regular rhythm.   No murmur heard.  Pulmonary/Chest: Breath sounds normal. No respiratory distress.   Abdominal: Soft. Bowel sounds are normal. She exhibits no distension.   Musculoskeletal: Normal range of motion. She exhibits edema.   Neurological: She is alert and oriented to person, place, and time.   Skin: Skin is warm  and dry.   Vitals reviewed.      Significant Labs:   CMP   Recent Labs   Lab 09/05/19  0432 09/06/19  0713    137   K 4.8 3.9    103   CO2 24 28   * 154*   BUN 9 11   CREATININE 1.4 1.3   CALCIUM 8.7 9.2   ANIONGAP 9 6*   ESTGFRAFRICA 43.3* 47.4*   EGFRNONAA 37.6* 41.1*    and CBC   Recent Labs   Lab 09/05/19  0432 09/06/19  0713   WBC 5.40 5.95   HGB 7.4* 7.7*   HCT 23.8* 24.7*    264       Significant Imaging:

## 2019-09-06 NOTE — PLAN OF CARE
Problem: Adult Inpatient Plan of Care  Goal: Plan of Care Review  Outcome: Ongoing (interventions implemented as appropriate)  A A O x 4. Free of falls, traumas and injuries. Skin intact. Denies shortness of breath, chest pain, nausea, vomiting. BG monitored ACHS. Plan of care reviewed with patient. Vital signs stable. Pain monitored. Will continue to monitor.

## 2019-09-06 NOTE — SIGNIFICANT EVENT
Patient BP running in the 160's SBP. Dr. Brandon notified. New order to give 0900 Metoprolol at 0330 AM. Will continue to monitor.

## 2019-09-06 NOTE — SIGNIFICANT EVENT
Patients . Goal is 130. Dr. Brandon notified; said to monitor for another hour and see how she does from giving her the metoprolol at 0330. Will continue to monitor.

## 2019-09-06 NOTE — PLAN OF CARE
Problem: Occupational Therapy Goal  Goal: Occupational Therapy Goal  Goals to be met by: 10/1/2019    Patient will increase functional independence with ADLs by performing:    Using sock aid for donning, and dressing stick for doffing socks LE Dressing with Set-up Assistance.  MET 9/3 POWER  Grooming while standing at sink with Set-up Assistance.   Toileting from toilet with Modified Nash and Assistive Devices as needed for hygiene and clothing management.   Bathing from  standing at sink with Modified Nash.  Supine to sit with Set-up Assistance while observing sternal precautions.  Toilet transfer to toilet with Modified Nash. MET 9/3 POWER      Outcome: Ongoing (interventions implemented as appropriate)  Continue POC     [place] OTR/L  Pager: [phone]  9/6/2019

## 2019-09-06 NOTE — PROGRESS NOTES
[facility]  Physical Medicine & Rehab  Progress Note    Patient Name: Kaylee Romero  MRN: [id number]  Admission Date: 8/27/2019  Length of Stay: 9 days  Attending Physician: Peterson Ventura MD    Subjective:     Principal Problem:S/P CABG (coronary artery bypass graft)    Hospital Course:   09/04/2019: BM [place]. Sit to stand and transfers SBA-SV.  Ambulated 2 x 100 ft SBA w/ occasional standing rest breaks 2/2 reports of fatigue.  ADLs SV.   09/05/2019: Bed mobility SBA-CGA .  Sit to stand and transfers SBA-Mod (I). Ambulated 2 x 100ft CGA-SBA.   Grooming/toileting SV-(I).    Interval History 9/6/2019:  Patient is seen for follow-up rehab evaluation and recommendations: Participating w/ therapy.    HPI, Past Medical, Family, and Social History remains the same as documented in the initial encounter.    Scheduled Medications:    amLODIPine  5 mg Oral Daily    aspirin  325 mg Oral Daily    atorvastatin  80 mg Oral Daily    famotidine  20 mg Oral Daily    furosemide  40 mg Oral BID    heparin (porcine)  5,000 Units Subcutaneous Q8H    insulin aspart U-100  12 Units Subcutaneous TIDWM    insulin detemir U-100  10 Units Subcutaneous Daily    losartan  100 mg Oral Daily    magnesium oxide  800 mg Oral BID    metoprolol succinate  100 mg Oral Daily    polyethylene glycol  17 g Oral Daily    potassium chloride  20 mEq Oral BID    senna-docusate 8.6-50 mg  1 tablet Oral BID       Diagnostic Results: Labs: Reviewed    PRN Medications: acetaminophen, Dextrose 10% Bolus, Dextrose 10% Bolus, Dextrose 10% Bolus, glucagon (human recombinant), glucose, glucose, insulin aspart U-100, labetalol, naproxen, ondansetron, oxyCODONE, sodium chloride 0.9%, traMADol    Review of Systems   Constitutional: Positive for activity change. Negative for fatigue and fever.   HENT: Negative for trouble swallowing and voice change.    Eyes: Negative for photophobia and visual disturbance.   Respiratory: Negative  for cough and shortness of breath.    Cardiovascular: Positive for leg swelling. Negative for chest pain and palpitations.   Gastrointestinal: Negative for abdominal distention, nausea and vomiting.   Genitourinary: Negative for difficulty urinating and flank pain.   Musculoskeletal: Positive for gait problem. Negative for arthralgias.   Skin: Positive for wound (surgical). Negative for rash.   Neurological: Positive for weakness. Negative for facial asymmetry, speech difficulty, numbness and headaches.   Psychiatric/Behavioral: Negative for agitation and confusion.     Objective:     Vital Signs (Most Recent):  Temp: 97.8 °F (36.6 °C) (09/06/19 0810)  Pulse: 75 (09/06/19 1000)  Resp: 18 (09/06/19 0810)  BP: (!) 183/75 (09/06/19 0930)  SpO2: 98 % (09/06/19 0810)    Vital Signs (24h Range):  Temp:  [97.8 °F (36.6 °C)-98.4 °F (36.9 °C)] 97.8 °F (36.6 °C)  Pulse:  [66-85] 75  Resp:  [16-18] 18  SpO2:  [94 %-98 %] 98 %  BP: (122-183)/(68-82) 183/75     Physical Exam   Constitutional: She is oriented to person, place, and time. She appears well-developed and well-nourished.   HENT:   Head: Normocephalic and atraumatic.   Eyes: Right eye exhibits no discharge. Left eye exhibits no discharge.   Neck: Neck supple.   Cardiovascular: Intact distal pulses.   BLE edema    Pulmonary/Chest: Effort normal. No respiratory distress.   Abdominal: Soft. There is no tenderness.   Musculoskeletal: She exhibits no edema or deformity.   Generalized deconditioning   Neurological: She is alert and oriented to person, place, and time. No sensory deficit. She exhibits normal muscle tone.   Follows commands    Skin: Skin is warm and dry.   Sternal incision   Psychiatric: She has a normal mood and affect. Her behavior is normal.   Vitals reviewed.     Assessment/Plan:      * S/P CABG (coronary artery bypass graft)  -s/p CABG 8/12/19  -on sternal precautions     Acute blood loss anemia  -CTS monitoring     Physical  deconditioning    Recommendations  -  Encourage mobility, OOB in chair at least 3 hours per day, and early ambulation as appropriate  -  PT/OT evaluate and treat  -  Pain management  -  Monitor for and prevent skin breakdown and pressure ulcers  · Early mobility, repositioning/weight shifting every 20-30 minutes when sitting, turn patient every 2 hours, proper mattress/overlay and chair cushioning, pressure relief/heel protector boots  -  DVT prophylaxis    -  Reviewed discharge options (IP rehab, SNF, HH therapy, and OP therapy)    CKD (chronic kidney disease) stage 3, GFR 30-59 ml/min  -DOREEN on CKD 3 s/p volume resuscitation  -receiving Lasix PO    Hypertension  -Cards consulted and managing     Recommend Inpatient Rehab.       Elvia Brewer NP  Department of Physical Medicine & Rehab   [facility]

## 2019-09-06 NOTE — PROGRESS NOTES
[facility]  Cardiothoracic Surgery  Progress Note    Patient Name: Kaylee Romero  MRN: [id number]  Admission Date: 8/27/2019  Hospital Length of Stay: 9 days  Code Status: Full Code   Attending Physician: Peterson Ventura MD   Referring Provider: Self, Aaareferral  Principal Problem:S/P CABG (coronary artery bypass graft)      Subjective:     Post-Op Info:  * No surgery found *         Interval History: NAEON. Pt has c/o left shoulder pain still will try NSAIDS today and added on norvasc for HTN    Review of Systems   Constitution: Negative for malaise/fatigue.   Cardiovascular: Negative for chest pain, claudication, dyspnea on exertion, irregular heartbeat, leg swelling and palpitations.   Respiratory: Negative for cough and shortness of breath.    Hematologic/Lymphatic: Negative for bleeding problem.   Gastrointestinal: Negative for abdominal pain.   Genitourinary: Negative for dysuria.   Neurological: Negative for headaches and weakness.     Medications:  Continuous Infusions:  Scheduled Meds:   amLODIPine  5 mg Oral Daily    aspirin  325 mg Oral Daily    atorvastatin  80 mg Oral Daily    famotidine  20 mg Oral Daily    furosemide  40 mg Oral BID    heparin (porcine)  5,000 Units Subcutaneous Q8H    insulin aspart U-100  12 Units Subcutaneous TIDWM    insulin detemir U-100  10 Units Subcutaneous Daily    losartan  100 mg Oral Daily    magnesium oxide  800 mg Oral BID    metoprolol succinate  100 mg Oral Daily    polyethylene glycol  17 g Oral Daily    potassium chloride  20 mEq Oral BID    senna-docusate 8.6-50 mg  1 tablet Oral BID     PRN Meds:acetaminophen, Dextrose 10% Bolus, Dextrose 10% Bolus, Dextrose 10% Bolus, glucagon (human recombinant), glucose, glucose, insulin aspart U-100, labetalol, naproxen, ondansetron, oxyCODONE, sodium chloride 0.9%, traMADol     Objective:     Vital Signs (Most Recent):  Temp: 97.8 °F (36.6 °C) (09/06/19 0810)  Pulse: 79 (09/06/19  0930)  Resp: 18 (09/06/19 0810)  BP: (!) 183/75 (09/06/19 0930)  SpO2: 98 % (09/06/19 0810) Vital Signs (24h Range):  Temp:  [97.8 °F (36.6 °C)-98.4 °F (36.9 °C)] 97.8 °F (36.6 °C)  Pulse:  [66-85] 79  Resp:  [16-18] 18  SpO2:  [94 %-98 %] 98 %  BP: (122-183)/(68-82) 183/75     Weight: 75.2 kg (165 lb 12.6 oz)  Body mass index is 27.59 kg/m².    SpO2: 98 %  O2 Device (Oxygen Therapy): room air    Intake/Output - Last 3 Shifts       09/04 0700 - 09/05 0659 09/05 0700 - 09/06 0659 09/06 0700 - 09/07 0659    P.O. 0 720     Total Intake(mL/kg) 0 (0) 720 (7.1)     Urine (mL/kg/hr) 600 (0.2) 1900 (0.8)     Stool  2     Total Output 600 1902     Net -600 -1182            Stool Occurrence  0 x           Lines/Drains/Airways     Peripheral Intravenous Line                 Peripheral IV - Single Lumen 09/04/19 2015 18 G Right Upper Arm 1 day                Physical Exam   Constitutional: She is oriented to person, place, and time. She appears well-developed and well-nourished.   Cardiovascular: Normal rate, regular rhythm and normal heart sounds.   Pulmonary/Chest: Effort normal and breath sounds normal.   Abdominal: Soft.   Neurological: She is alert and oriented to person, place, and time.   Skin: Skin is warm, dry and intact.   Psychiatric: She has a normal mood and affect.       Significant Labs:  BMP:   Recent Labs   Lab 09/05/19  0432 09/06/19  0713   * 154*    137   K 4.8 3.9    103   CO2 24 28   BUN 9 11   CREATININE 1.4 1.3   CALCIUM 8.7 9.2   MG 1.7  --      CBC:   Recent Labs   Lab 09/06/19  0713   WBC 5.95   RBC 2.80*   HGB 7.7*   HCT 24.7*      MCV 88   MCH 27.5   MCHC 31.2*         Assessment/Plan:     * S/P CABG (coronary artery bypass graft)  Sternal Precautions  PT/OT  Ambulate  ASA  Metop  Lasix PO    Dispo: Rehab pending placement       Acute blood loss anemia  H/H stable  CBC daily       Type 2 diabetes mellitus with stage 3 chronic kidney disease, without long-term current use of  insulin  Endocrine following      Physical deconditioning  PT/OT   Planning for SNF placement     CKD (chronic kidney disease) stage 3, GFR 30-59 ml/min  Strict I/O --> will need to assess UOP  Daily labs  CR stable     Morbid obesity with body mass index (BMI) of 40.0 or higher  PT/OT   Cardiac diet     Hypertension  Norvasc 5 mg daily added  Losartan 100 mg daily   Cards consulted; appreciate burke Warner NP  Cardiothoracic Surgery  [facility]

## 2019-09-06 NOTE — SIGNIFICANT EVENT
Patient's BP running in the 170's SBP. Dr. Brandon notified. New order to give Labetalol 10 mg IVP. Continued to monitor.      Patient was having rebound HTN. Dr. Brandon notified and gave Labetalol 10 mg IVP again. Will continue to monitor.

## 2019-09-06 NOTE — PT/OT/SLP PROGRESS
"Physical Therapy Treatment    Patient Name:  Kaylee Romero   MRN:  [id number]    Recommendations:     Discharge Recommendations:  rehabilitation facility   Discharge Equipment Recommendations: none   Barriers to discharge: Decreased caregiver support at current functional level    Assessment:     Kaylee Romero is a 74 y.o. female admitted with a medical diagnosis of S/P CABG (coronary artery bypass graft).  She presents with the following impairments/functional limitations:  weakness, impaired functional mobilty, impaired endurance, gait instability, impaired balance, impaired cardiopulmonary response to activity, pain, decreased upper extremity function, decreased safety awareness, impaired self care skills. Pt progressing functional mobility, as she was able to increase gait distance this date. However, she continues to demo impaired endurance with increasing SOB noted as gait distance progressed, limiting further progression. Pt also demo'd impaired safety awareness and decreased standing balance throughout mobility; given recent fall history pt is at increased risk of falls. Pt would continue to benefit from skilled acute PT in order to address current deficits and progress functional mobility. Pt is a good candidate for IP rehab in order to further progress safety and independence with mobility prior to returning home.     Rehab Prognosis: Good; patient would benefit from acute skilled PT services to address these deficits and reach maximum level of function.    Recent Surgery: * No surgery found *      Plan:     During this hospitalization, patient to be seen 3 x/week to address the identified rehab impairments via gait training, therapeutic activities, therapeutic exercises, neuromuscular re-education and progress toward the following goals:    · Plan of Care Expires:  10/01/19    Subjective     Chief Complaint: L shoulder pain   Patient/Family Comments/goals: "We have a problem this morning, and it's a " "painful one." re: discomfort at inner thigh area. Pt requesting barrier cream applied prior to ambulating.   Pain/Comfort:  · Pain Rating 1: (reported L shoulder pain; did not rate)  · Pain Addressed 1: Reposition, Distraction, Nurse notified      Objective:     Communicated with RN prior to session.  Patient found up in chair with telemetry upon PT entry to room.     General Precautions: Standard, fall, sternal   Orthopedic Precautions:N/A   Braces: N/A     Functional Mobility:  · Transfers:     · Sit to Stand:  stand by assistance with no AD and x2 reps from bedside chair  · With cues for maintaining sternal precautions  · Gait: ~125 ft. x2 with CGA and no AD  · demo'd decreased chantell, decreased step length, gait instability, impaired weight-shifting ability, and increased postural sway  · SOB noted with exertion, which increased as gait distance progressed; cues for pursed lip breathing provided; SOB gradually improved with seated rest  · Cues throughout for self-pacing and safety awareness       AM-PAC 6 CLICK MOBILITY  Turning over in bed (including adjusting bedclothes, sheets and blankets)?: 3  Sitting down on and standing up from a chair with arms (e.g., wheelchair, bedside commode, etc.): 3  Moving from lying on back to sitting on the side of the bed?: 3  Moving to and from a bed to a chair (including a wheelchair)?: 3  Need to walk in hospital room?: 3  Climbing 3-5 steps with a railing?: 2  Basic Mobility Total Score: 17       Therapeutic Activities and Exercises:  Reviewed sternal precautions. Pt requiring mod v/c to maintain throughout session.   Completed self-care tasks at bathroom sink with CGA for standing balance.   Completed functional mobility as described above. Decreased safety awareness and impaired standing balance noted, increasing pt fall risk. Cues for safety awareness provided throughout session, but pt somewhat inattentive to PT cues     Patient left up in chair with all lines intact, " call button in reach and pt's  present..    GOALS:   Multidisciplinary Problems     Physical Therapy Goals        Problem: Physical Therapy Goal    Goal Priority Disciplines Outcome Goal Variances Interventions   Physical Therapy Goal     PT, PT/OT Ongoing (interventions implemented as appropriate)     Description:  Goals to be met by: 19     Patient will increase functional independence with mobility by performin. Supine to sit with Stand-by Assistance within sternal precautions; HOB flat - Not met  2. Sit to supine with Stand-by Assistance within sternal precautions; HOB flat - Not met  3. Sit to stand transfer with Mod (I) within sternal precautions - Not met  4. Bed to chair transfer with Supervision without device within sternal precautions - Not met  5. Gait  x 200 feet with Stand-by Assistance - Not met  6. Ascend/descend 4 stairs with right Handrail with Contact Guard Assistance - Not met  7. Lower extremity exercise program x 20 reps per handout, with independence - Not met                     Time Tracking:     PT Received On: 19  PT Start Time: 0900     PT Stop Time: 923  PT Total Time (min): 23 min     Billable Minutes: Gait Training 8 and Therapeutic Activity 15    Treatment Type: Treatment  PT/PTA: PT     PTA Visit Number: 0     Ericka Mckeon, PT, DPT   2019  [phone]

## 2019-09-06 NOTE — ASSESSMENT & PLAN NOTE
- s/p IV lasix 40mg x1 dose; recommend continue lasix 40mg PO daily on discharge  - continue losartan 100mg daily on discharge  - continue toprol XL to 100mg daily   - agree with amlodipine 5mg daily  - goal SBP < 130/80  - can follow up in Cardiology clinic in 4-6 weeks post discharge.

## 2019-09-06 NOTE — SUBJECTIVE & OBJECTIVE
Interval History 9/6/2019:  Patient is seen for follow-up rehab evaluation and recommendations: Participating w/ therapy.    HPI, Past Medical, Family, and Social History remains the same as documented in the initial encounter.    Scheduled Medications:    amLODIPine  5 mg Oral Daily    aspirin  325 mg Oral Daily    atorvastatin  80 mg Oral Daily    famotidine  20 mg Oral Daily    furosemide  40 mg Oral BID    heparin (porcine)  5,000 Units Subcutaneous Q8H    insulin aspart U-100  12 Units Subcutaneous TIDWM    insulin detemir U-100  10 Units Subcutaneous Daily    losartan  100 mg Oral Daily    magnesium oxide  800 mg Oral BID    metoprolol succinate  100 mg Oral Daily    polyethylene glycol  17 g Oral Daily    potassium chloride  20 mEq Oral BID    senna-docusate 8.6-50 mg  1 tablet Oral BID       Diagnostic Results: Labs: Reviewed    PRN Medications: acetaminophen, Dextrose 10% Bolus, Dextrose 10% Bolus, Dextrose 10% Bolus, glucagon (human recombinant), glucose, glucose, insulin aspart U-100, labetalol, naproxen, ondansetron, oxyCODONE, sodium chloride 0.9%, traMADol    Review of Systems   Constitutional: Positive for activity change. Negative for fatigue and fever.   HENT: Negative for trouble swallowing and voice change.    Eyes: Negative for photophobia and visual disturbance.   Respiratory: Negative for cough and shortness of breath.    Cardiovascular: Positive for leg swelling. Negative for chest pain and palpitations.   Gastrointestinal: Negative for abdominal distention, nausea and vomiting.   Genitourinary: Negative for difficulty urinating and flank pain.   Musculoskeletal: Positive for gait problem. Negative for arthralgias.   Skin: Positive for wound (surgical). Negative for rash.   Neurological: Positive for weakness. Negative for facial asymmetry, speech difficulty, numbness and headaches.   Psychiatric/Behavioral: Negative for agitation and confusion.     Objective:     Vital Signs  (Most Recent):  Temp: 97.8 °F (36.6 °C) (09/06/19 0810)  Pulse: 75 (09/06/19 1000)  Resp: 18 (09/06/19 0810)  BP: (!) 183/75 (09/06/19 0930)  SpO2: 98 % (09/06/19 0810)    Vital Signs (24h Range):  Temp:  [97.8 °F (36.6 °C)-98.4 °F (36.9 °C)] 97.8 °F (36.6 °C)  Pulse:  [66-85] 75  Resp:  [16-18] 18  SpO2:  [94 %-98 %] 98 %  BP: (122-183)/(68-82) 183/75     Physical Exam   Constitutional: She is oriented to person, place, and time. She appears well-developed and well-nourished.   HENT:   Head: Normocephalic and atraumatic.   Eyes: Right eye exhibits no discharge. Left eye exhibits no discharge.   Neck: Neck supple.   Cardiovascular: Intact distal pulses.   BLE edema    Pulmonary/Chest: Effort normal. No respiratory distress.   Abdominal: Soft. There is no tenderness.   Musculoskeletal: She exhibits no edema or deformity.   Generalized deconditioning   Neurological: She is alert and oriented to person, place, and time. No sensory deficit. She exhibits normal muscle tone.   Follows commands    Skin: Skin is warm and dry.   Sternal incision   Psychiatric: She has a normal mood and affect. Her behavior is normal.   Vitals reviewed.    NEUROLOGICAL EXAMINATION:     MENTAL STATUS   Oriented to person, place, and time.

## 2019-09-06 NOTE — PLAN OF CARE
Problem: Physical Therapy Goal  Goal: Physical Therapy Goal  Goals to be met by: 19     Patient will increase functional independence with mobility by performin. Supine to sit with Stand-by Assistance within sternal precautions; HOB flat - Not met  2. Sit to supine with Stand-by Assistance within sternal precautions; HOB flat - Not met  3. Sit to stand transfer with Mod (I) within sternal precautions - Not met  4. Bed to chair transfer with Supervision without device within sternal precautions - Not met  5. Gait  x 200 feet with Stand-by Assistance - Not met  6. Ascend/descend 4 stairs with right Handrail with Contact Guard Assistance - Not met  7. Lower extremity exercise program x 20 reps per handout, with independence - Not met    Outcome: Ongoing (interventions implemented as appropriate)  Goals reviewed and remain appropriate. Pt progressing towards goals.    Ericka Mckeon, PT, DPT   2019  [phone]

## 2019-09-06 NOTE — PT/OT/SLP PROGRESS
Occupational Therapy   Treatment    Name: Kaylee Romero  MRN: [id number]  Admitting Diagnosis:  S/P CABG (coronary artery bypass graft)       Recommendations:     Discharge Recommendations: rehabilitation facility  Discharge Equipment Recommendations:  none  Barriers to discharge:  None    Assessment:     Kaylee Romero is a 74 y.o. female with a medical diagnosis of S/P CABG (coronary artery bypass graft).  She presents with performance deficits affecting function are weakness, gait instability, impaired endurance, impaired functional mobilty, decreased safety awareness, impaired cardiopulmonary response to activity, impaired self care skills, impaired balance, pain, decreased upper extremity function. Pt tolerated session well this date and is progressing well towards all goals. However, pt presents with decreased safety awareness and impaired activity tolerance inhibiting pt from safely returning to the home environment at this time. Pt attempted to stand while donning underwear on wet floor after therapist educated pt on impaired of performing LB dressing while sitting on stable surface I.e chair or EOB bed. At this time, OT recommending IP Rehab in order to improve pt's overall safety with mobility, transfers and ADLs. Pt would benefit from continued skilled acute OT services in order to maximize independence and safety with ADLs and functional mobility to ensure safe return to PLOF in the least restrictive environment.       Rehab Prognosis:  Good; patient would benefit from acute skilled OT services to address these deficits and reach maximum level of function.       Plan:     Patient to be seen 4 x/week to address the above listed problems via self-care/home management, therapeutic exercises, therapeutic activities  · Plan of Care Expires: 10/01/19  · Plan of Care Reviewed with: patient, spouse    Subjective     Pain/Comfort:  · Pain Rating 1: (L shoulder pain reported but did not rate )  · Pain  "Addressed 1: Distraction    Objective:     Communicated with: RN prior to session.  Patient found up in chair with telemetry upon OT entry to room. Pt agreeable to therapy session. Pt's  left room during therapy session. Pt stated, "Now wouldn't that be something"     General Precautions: Standard, fall, sternal   Orthopedic Precautions:N/A   Braces: N/A     Occupational Performance:     Bed Mobility:    · Not performed this date.     Functional Mobility/Transfers:  · Patient completed Sit <> Stand Transfer with stand by assistance  with  no assistive device   · Patient completed Toilet Transfer Step Transfer technique with stand by assistance with  no AD  · Functional Mobility: Pt engaging in functional mobility to simulate household distances within pt's room  with SBA using no AD  in order to maximize functional activity tolerance and standing balance required for engagement in occupations of choice.   · Pt retreived self-care items around room  · Pt demonstrated slight instability but no overt LOB     Activities of Daily Living:  · Grooming: set-up A/supervision     · Pt performed oral hygiene, washed face, applied lotion to face, upper/lower body while standing at the sink.   · Cueing provided for safety during self-care routine due to wet floor and keeping items needed for self care tasks within close range of pt rather than on opposite side of bathroom   · Bathing: supervision    · Pt washed upper/lower body in standing with set-up A.   · Verbal cues provided for pt safety when performing bathing at sink side due to wet floor.  · Upper Body Dressing: set-up A   · Pt donned personal gown in standing with SBA  · Lower Body Dressing: minimum assistance    · Pt donned underwear in standing with min A to thread L foot   · OT instructed pt to perform in sitting for pt's safety but pt insisted on performing task in standing.   · Pt educated on importance of safety when performing LB dressing in standing vs " sitting   · Toileting: modified independence   · Pt performed hygiene and clothing management     [facility] 6 Click ADL: 22    Treatment & Education:  - Pt educated on role of OT, POC, and goals for therapy.    - Pt educated on safety when performing dressing and grooming tasks standing at the sink   - Educated pt on being appropriate to transfer with nsg and PCT with x 1 person assist for safety   - Importance of OOB ax's with staff member assistance and sitting OOB majority of day.   - Pt completed ADLs and functional mobility for treatment session as noted above   - Pt verbalized understanding. Pt expressed no further concerns/questions.  - whiteboard updated     Patient left up in chair with all lines intact, call button in reach, RN notified and   presentEducation:      GOALS:   Multidisciplinary Problems     Occupational Therapy Goals        Problem: Occupational Therapy Goal    Goal Priority Disciplines Outcome Interventions   Occupational Therapy Goal     OT, PT/OT Ongoing (interventions implemented as appropriate)    Description:  Goals to be met by: 10/1/2019    Patient will increase functional independence with ADLs by performing:    Using sock aid for donning, and dressing stick for doffing socks LE Dressing with Set-up Assistance.  MET 9/3 POWER  Grooming while standing at sink with Set-up Assistance.   Toileting from toilet with Modified [place] and Assistive Devices as needed for hygiene and clothing management.   Bathing from  standing at sink with Modified [place].  Supine to sit with Set-up Assistance while observing sternal precautions.  Toilet transfer to toilet with Modified [place]. MET 9/3 POWER                       Time Tracking:     OT Date of Treatment: 09/06/19  OT Start Time: 0957  OT Stop Time: 1041  OT Total Time (min): 44 min    Billable Minutes:Self Care/Home Management 44    [place] OT  9/6/2019

## 2019-09-06 NOTE — PLAN OF CARE
Problem: Adult Inpatient Plan of Care  Goal: Plan of Care Review  LOC: alert and oriented x 4.   SKIN: The skin is warm, dry. Perineal area has some discomfort; applied barrier cream. Had moderate relief.  RESPIRATORY: Respirations are WNL, even and unlabored. Normal effort and rate noted. No accessory muscle use noted. Patient is on room air.  CARDIAC: Patient runs normal sinus on the monitor.  ABDOMEN: Soft and non tender to palpation. No distention noted.   URINARY: continent. Up to bathroom with stand by assist.  EXTREMITIES: Extremities are WNL; general weakness throughout and some pain in left shoulder.  Neuro: Pt able to follow commands and is calm and cooperative with care.     POC reviewed with patient. Patient's SBP was in the 160's all night. Gave labetalol and metoprolol. Lowest SBP came down to was 149. Patient had some discomfort in perineal area. Assessed and put barrier cream on it. Patient said it feels a lot better now. Patient is free from falls and injury. Patient has some pain in her left shoulder but other than that denies pain. All questions addressed. Will continue to monitor.

## 2019-09-06 NOTE — PLAN OF CARE
09/06/19 1624   Discharge Assessment   Assessment Type Discharge Planning Reassessment   Assessment information obtained from? Patient;Caregiver   Discharge Plan A Rehab   Discharge Plan B Skilled Nursing Facility   Patient/Family in Agreement with Plan yes     Pt recs changed yesterday from SNF to IRF. [facility] submitted for auth, pending insurance auth, under review by the medical director for PHN. The patient requires acute level of care with BP management and endocrine recs. The patient received IV labetalol x2 in early am hours today. She would not be SNF appropriate at this time d/t ongoing acute needs. PT/OT recs for more intensive therapy to get the patient stronger prior to discharge to home. The medical team is in agreement that IRF is the best level of care for the patient at this time to prevent another readmission. Possible auth approval to be obtained by tomorrow over the weekend. Keila Warner NP contact information given to Berkley simpson/JOSE to give to the Medical Director in the event they have any questions regarding request for IRF. Unsure if the auth will be given over the weekend. PHN has not given auth on the weekend in the past.

## 2019-09-06 NOTE — SUBJECTIVE & OBJECTIVE
Interval History: NAEON. Pt has c/o left shoulder pain still will try NSAIDS today and added on norvasc for HTN    Review of Systems   Constitution: Negative for malaise/fatigue.   Cardiovascular: Negative for chest pain, claudication, dyspnea on exertion, irregular heartbeat, leg swelling and palpitations.   Respiratory: Negative for cough and shortness of breath.    Hematologic/Lymphatic: Negative for bleeding problem.   Gastrointestinal: Negative for abdominal pain.   Genitourinary: Negative for dysuria.   Neurological: Negative for headaches and weakness.     Medications:  Continuous Infusions:  Scheduled Meds:   amLODIPine  5 mg Oral Daily    aspirin  325 mg Oral Daily    atorvastatin  80 mg Oral Daily    famotidine  20 mg Oral Daily    furosemide  40 mg Oral BID    heparin (porcine)  5,000 Units Subcutaneous Q8H    insulin aspart U-100  12 Units Subcutaneous TIDWM    insulin detemir U-100  10 Units Subcutaneous Daily    losartan  100 mg Oral Daily    magnesium oxide  800 mg Oral BID    metoprolol succinate  100 mg Oral Daily    polyethylene glycol  17 g Oral Daily    potassium chloride  20 mEq Oral BID    senna-docusate 8.6-50 mg  1 tablet Oral BID     PRN Meds:acetaminophen, Dextrose 10% Bolus, Dextrose 10% Bolus, Dextrose 10% Bolus, glucagon (human recombinant), glucose, glucose, insulin aspart U-100, labetalol, naproxen, ondansetron, oxyCODONE, sodium chloride 0.9%, traMADol     Objective:     Vital Signs (Most Recent):  Temp: 97.8 °F (36.6 °C) (09/06/19 0810)  Pulse: 79 (09/06/19 0930)  Resp: 18 (09/06/19 0810)  BP: (!) 183/75 (09/06/19 0930)  SpO2: 98 % (09/06/19 0810) Vital Signs (24h Range):  Temp:  [97.8 °F (36.6 °C)-98.4 °F (36.9 °C)] 97.8 °F (36.6 °C)  Pulse:  [66-85] 79  Resp:  [16-18] 18  SpO2:  [94 %-98 %] 98 %  BP: (122-183)/(68-82) 183/75     Weight: 75.2 kg (165 lb 12.6 oz)  Body mass index is 27.59 kg/m².    SpO2: 98 %  O2 Device (Oxygen Therapy): room air    Intake/Output - Last  3 Shifts       09/04 0700 - 09/05 0659 09/05 0700 - 09/06 0659 09/06 0700 - 09/07 0659    P.O. 0 720     Total Intake(mL/kg) 0 (0) 720 (7.1)     Urine (mL/kg/hr) 600 (0.2) 1900 (0.8)     Stool  2     Total Output 600 1902     Net -600 -1182            Stool Occurrence  0 x           Lines/Drains/Airways     Peripheral Intravenous Line                 Peripheral IV - Single Lumen 09/04/19 2015 18 G Right Upper Arm 1 day                Physical Exam   Constitutional: She is oriented to person, place, and time. She appears well-developed and well-nourished.   Cardiovascular: Normal rate, regular rhythm and normal heart sounds.   Pulmonary/Chest: Effort normal and breath sounds normal.   Abdominal: Soft.   Neurological: She is alert and oriented to person, place, and time.   Skin: Skin is warm, dry and intact.   Psychiatric: She has a normal mood and affect.       Significant Labs:  BMP:   Recent Labs   Lab 09/05/19  0432 09/06/19  0713   * 154*    137   K 4.8 3.9    103   CO2 24 28   BUN 9 11   CREATININE 1.4 1.3   CALCIUM 8.7 9.2   MG 1.7  --      CBC:   Recent Labs   Lab 09/06/19  0713   WBC 5.95   RBC 2.80*   HGB 7.7*   HCT 24.7*      MCV 88   MCH 27.5   MCHC 31.2*

## 2019-09-06 NOTE — ASSESSMENT & PLAN NOTE
Recommendations  -  Encourage mobility, OOB in chair at least 3 hours per day, and early ambulation as appropriate  -  PT/OT evaluate and treat  -  Pain management  -  Monitor for and prevent skin breakdown and pressure ulcers  · Early mobility, repositioning/weight shifting every 20-30 minutes when sitting, turn patient every 2 hours, proper mattress/overlay and chair cushioning, pressure relief/heel protector boots  -  DVT prophylaxis    -  Reviewed discharge options (IP rehab, SNF, HH therapy, and OP therapy)

## 2019-09-07 LAB
ANION GAP SERPL CALC-SCNC: 11 MMOL/L (ref 8–16)
BASOPHILS # BLD AUTO: 0.03 K/UL (ref 0–0.2)
BASOPHILS NFR BLD: 0.5 % (ref 0–1.9)
BUN SERPL-MCNC: 14 MG/DL (ref 8–23)
CALCIUM SERPL-MCNC: 9.5 MG/DL (ref 8.7–10.5)
CHLORIDE SERPL-SCNC: 100 MMOL/L (ref 95–110)
CO2 SERPL-SCNC: 25 MMOL/L (ref 23–29)
CREAT SERPL-MCNC: 1.3 MG/DL (ref 0.5–1.4)
DIFFERENTIAL METHOD: ABNORMAL
EOSINOPHIL # BLD AUTO: 0.2 K/UL (ref 0–0.5)
EOSINOPHIL NFR BLD: 3.6 % (ref 0–8)
ERYTHROCYTE [DISTWIDTH] IN BLOOD BY AUTOMATED COUNT: 17.7 % (ref 11.5–14.5)
EST. GFR  (AFRICAN AMERICAN): 47.4 ML/MIN/1.73 M^2
EST. GFR  (NON AFRICAN AMERICAN): 41.1 ML/MIN/1.73 M^2
GLUCOSE SERPL-MCNC: 156 MG/DL (ref 70–110)
HCT VFR BLD AUTO: 25.9 % (ref 37–48.5)
HGB BLD-MCNC: 7.8 G/DL (ref 12–16)
IMM GRANULOCYTES # BLD AUTO: 0.2 K/UL (ref 0–0.04)
IMM GRANULOCYTES NFR BLD AUTO: 3.3 % (ref 0–0.5)
LYMPHOCYTES # BLD AUTO: 1.8 K/UL (ref 1–4.8)
LYMPHOCYTES NFR BLD: 29.3 % (ref 18–48)
MAGNESIUM SERPL-MCNC: 1.7 MG/DL (ref 1.6–2.6)
MCH RBC QN AUTO: 27.8 PG (ref 27–31)
MCHC RBC AUTO-ENTMCNC: 30.1 G/DL (ref 32–36)
MCV RBC AUTO: 92 FL (ref 82–98)
MONOCYTES # BLD AUTO: 0.5 K/UL (ref 0.3–1)
MONOCYTES NFR BLD: 8 % (ref 4–15)
NEUTROPHILS # BLD AUTO: 3.4 K/UL (ref 1.8–7.7)
NEUTROPHILS NFR BLD: 55.3 % (ref 38–73)
NRBC BLD-RTO: 0 /100 WBC
PHOSPHATE SERPL-MCNC: 3.6 MG/DL (ref 2.7–4.5)
PLATELET # BLD AUTO: 273 K/UL (ref 150–350)
PMV BLD AUTO: 10.8 FL (ref 9.2–12.9)
POCT GLUCOSE: 130 MG/DL (ref 70–110)
POCT GLUCOSE: 138 MG/DL (ref 70–110)
POCT GLUCOSE: 209 MG/DL (ref 70–110)
POCT GLUCOSE: 276 MG/DL (ref 70–110)
POTASSIUM SERPL-SCNC: 4.5 MMOL/L (ref 3.5–5.1)
RBC # BLD AUTO: 2.81 M/UL (ref 4–5.4)
SODIUM SERPL-SCNC: 136 MMOL/L (ref 136–145)
WBC # BLD AUTO: 6.14 K/UL (ref 3.9–12.7)

## 2019-09-07 PROCEDURE — 80048 BASIC METABOLIC PNL TOTAL CA: CPT

## 2019-09-07 PROCEDURE — 83735 ASSAY OF MAGNESIUM: CPT

## 2019-09-07 PROCEDURE — 25000003 PHARM REV CODE 250: Performed by: STUDENT IN AN ORGANIZED HEALTH CARE EDUCATION/TRAINING PROGRAM

## 2019-09-07 PROCEDURE — 99232 SBSQ HOSP IP/OBS MODERATE 35: CPT | Mod: ,,, | Performed by: NURSE PRACTITIONER

## 2019-09-07 PROCEDURE — 99232 SBSQ HOSP IP/OBS MODERATE 35: CPT | Mod: ,,, | Performed by: INTERNAL MEDICINE

## 2019-09-07 PROCEDURE — 99232 PR SUBSEQUENT HOSPITAL CARE,LEVL II: ICD-10-PCS | Mod: ,,, | Performed by: INTERNAL MEDICINE

## 2019-09-07 PROCEDURE — 85025 COMPLETE CBC W/AUTO DIFF WBC: CPT

## 2019-09-07 PROCEDURE — 20600001 HC STEP DOWN PRIVATE ROOM

## 2019-09-07 PROCEDURE — 84100 ASSAY OF PHOSPHORUS: CPT

## 2019-09-07 PROCEDURE — 36415 COLL VENOUS BLD VENIPUNCTURE: CPT

## 2019-09-07 PROCEDURE — 25000003 PHARM REV CODE 250: Performed by: SURGERY

## 2019-09-07 PROCEDURE — 99232 PR SUBSEQUENT HOSPITAL CARE,LEVL II: ICD-10-PCS | Mod: ,,, | Performed by: NURSE PRACTITIONER

## 2019-09-07 PROCEDURE — 25000003 PHARM REV CODE 250: Performed by: NURSE PRACTITIONER

## 2019-09-07 PROCEDURE — 63600175 PHARM REV CODE 636 W HCPCS: Performed by: STUDENT IN AN ORGANIZED HEALTH CARE EDUCATION/TRAINING PROGRAM

## 2019-09-07 RX ORDER — LANOLIN ALCOHOL/MO/W.PET/CERES
800 CREAM (GRAM) TOPICAL 3 TIMES DAILY
Status: DISCONTINUED | OUTPATIENT
Start: 2019-09-07 | End: 2019-09-09 | Stop reason: HOSPADM

## 2019-09-07 RX ORDER — AMLODIPINE BESYLATE 10 MG/1
10 TABLET ORAL DAILY
Status: DISCONTINUED | OUTPATIENT
Start: 2019-09-07 | End: 2019-09-09 | Stop reason: HOSPADM

## 2019-09-07 RX ORDER — INSULIN ASPART 100 [IU]/ML
14 INJECTION, SOLUTION INTRAVENOUS; SUBCUTANEOUS
Status: DISCONTINUED | OUTPATIENT
Start: 2019-09-07 | End: 2019-09-09 | Stop reason: HOSPADM

## 2019-09-07 RX ADMIN — INSULIN ASPART 12 UNITS: 100 INJECTION, SOLUTION INTRAVENOUS; SUBCUTANEOUS at 11:09

## 2019-09-07 RX ADMIN — HEPARIN SODIUM 5000 UNITS: 5000 INJECTION, SOLUTION INTRAVENOUS; SUBCUTANEOUS at 02:09

## 2019-09-07 RX ADMIN — INSULIN ASPART 14 UNITS: 100 INJECTION, SOLUTION INTRAVENOUS; SUBCUTANEOUS at 04:09

## 2019-09-07 RX ADMIN — FUROSEMIDE 40 MG: 40 TABLET ORAL at 08:09

## 2019-09-07 RX ADMIN — MAGNESIUM OXIDE TAB 400 MG (241.3 MG ELEMENTAL MG) 800 MG: 400 (241.3 MG) TAB at 02:09

## 2019-09-07 RX ADMIN — FUROSEMIDE 40 MG: 40 TABLET ORAL at 05:09

## 2019-09-07 RX ADMIN — NAPROXEN 500 MG: 500 TABLET ORAL at 09:09

## 2019-09-07 RX ADMIN — INSULIN ASPART 12 UNITS: 100 INJECTION, SOLUTION INTRAVENOUS; SUBCUTANEOUS at 08:09

## 2019-09-07 RX ADMIN — LABETALOL HCL IV SOLN PREFILLED SYRINGE 20 MG/4ML (5 MG/ML) 10 MG: 20/4 SOLUTION PREFILLED SYRINGE at 04:09

## 2019-09-07 RX ADMIN — FAMOTIDINE 20 MG: 20 TABLET, FILM COATED ORAL at 08:09

## 2019-09-07 RX ADMIN — MAGNESIUM OXIDE TAB 400 MG (241.3 MG ELEMENTAL MG) 800 MG: 400 (241.3 MG) TAB at 08:09

## 2019-09-07 RX ADMIN — ATORVASTATIN CALCIUM 80 MG: 20 TABLET, FILM COATED ORAL at 08:09

## 2019-09-07 RX ADMIN — ASPIRIN 325 MG: 325 TABLET, DELAYED RELEASE ORAL at 08:09

## 2019-09-07 RX ADMIN — LOSARTAN POTASSIUM 100 MG: 50 TABLET, FILM COATED ORAL at 08:09

## 2019-09-07 RX ADMIN — INSULIN ASPART 2 UNITS: 100 INJECTION, SOLUTION INTRAVENOUS; SUBCUTANEOUS at 08:09

## 2019-09-07 RX ADMIN — AMLODIPINE BESYLATE 10 MG: 10 TABLET ORAL at 08:09

## 2019-09-07 RX ADMIN — HEPARIN SODIUM 5000 UNITS: 5000 INJECTION, SOLUTION INTRAVENOUS; SUBCUTANEOUS at 06:09

## 2019-09-07 RX ADMIN — TRAMADOL HYDROCHLORIDE 50 MG: 50 TABLET, FILM COATED ORAL at 03:09

## 2019-09-07 RX ADMIN — POTASSIUM CHLORIDE 20 MEQ: 20 TABLET, EXTENDED RELEASE ORAL at 08:09

## 2019-09-07 RX ADMIN — INSULIN DETEMIR 10 UNITS: 100 INJECTION, SOLUTION SUBCUTANEOUS at 08:09

## 2019-09-07 RX ADMIN — HEPARIN SODIUM 5000 UNITS: 5000 INJECTION, SOLUTION INTRAVENOUS; SUBCUTANEOUS at 08:09

## 2019-09-07 RX ADMIN — INSULIN ASPART 3 UNITS: 100 INJECTION, SOLUTION INTRAVENOUS; SUBCUTANEOUS at 11:09

## 2019-09-07 RX ADMIN — METOPROLOL SUCCINATE 100 MG: 50 TABLET, EXTENDED RELEASE ORAL at 08:09

## 2019-09-07 NOTE — PROGRESS NOTES
[facility]  Cardiology  Progress Note    Patient Name: Kaylee Romero  MRN: [id number]  Admission Date: 8/27/2019  Hospital Length of Stay: 10 days  Code Status: Full Code   Attending Physician: Peterson Ventura MD   Primary Care Physician: Liz Roberts MD  Expected Discharge Date: 9/4/2019  Principal Problem:S/P CABG (coronary artery bypass graft)    Subjective:     Interval History: No acute events overnight. BP remained elevated to 130s-170s SBP, but patient did not receive medications. Reports shoulder pain. Denies chest pain/shortness of breath.       Objective:     Vital Signs (Most Recent):  Temp: 98.3 °F (36.8 °C) (09/07/19 1115)  Pulse: 76 (09/07/19 1115)  Resp: 18 (09/07/19 1115)  BP: (!) 155/65 (09/07/19 1115)  SpO2: 97 % (09/07/19 1115) Vital Signs (24h Range):  Temp:  [98.1 °F (36.7 °C)-98.6 °F (37 °C)] 98.3 °F (36.8 °C)  Pulse:  [66-91] 76  Resp:  [16-18] 18  SpO2:  [95 %-98 %] 97 %  BP: (137-176)/(55-79) 155/65     Weight: 100.6 kg (221 lb 12.5 oz)  Body mass index is 36.91 kg/m².     SpO2: 97 %  O2 Device (Oxygen Therapy): room air      Intake/Output Summary (Last 24 hours) at 9/7/2019 1148  Last data filed at 9/7/2019 1100  Gross per 24 hour   Intake 1080 ml   Output 2800 ml   Net -1720 ml       Lines/Drains/Airways     Peripheral Intravenous Line                 Peripheral IV - Single Lumen 09/04/19 2015 18 G Right Upper Arm 2 days                Physical Exam   Constitutional: She is oriented to person, place, and time. She appears well-developed and well-nourished.   HENT:   Head: Normocephalic and atraumatic.   Eyes: Pupils are equal, round, and reactive to light. Conjunctivae are normal.   Neck: Neck supple. No JVD present.   Cardiovascular: Normal rate, regular rhythm, normal heart sounds and intact distal pulses.   Pulmonary/Chest: Effort normal and breath sounds normal.   Abdominal: Soft. Bowel sounds are normal.   Musculoskeletal: Normal range of motion. She  exhibits edema.   Neurological: She is alert and oriented to person, place, and time.   Skin: Skin is warm and dry.   Psychiatric: She has a normal mood and affect.       Significant Labs:   BMP:   Recent Labs   Lab 09/06/19  0713 09/07/19  0454   * 156*    136   K 3.9 4.5    100   CO2 28 25   BUN 11 14   CREATININE 1.3 1.3   CALCIUM 9.2 9.5   MG  --  1.7   , CBC   Recent Labs   Lab 09/06/19  0713 09/07/19  0454   WBC 5.95 6.14   HGB 7.7* 7.8*   HCT 24.7* 25.9*    273    and Troponin No results for input(s): TROPONINI in the last 48 hours.      Assessment and Plan:       * S/P CABG (coronary artery bypass graft)  - s/p CABG LIMA to LAD, SVG to OM  - TTE 8/28/19: EF 75%, PA pressure 17  - continue aspirin, atorva, losartan, lopresso  - manage per primary team    KAMRAN on CPAP  - continue use of CPAP nightly.    Hypertension  - s/p IV lasix 40mg x1 dose; recommend continue lasix 40mg PO daily on discharge  - continue losartan 100mg daily on discharge  - continue toprol XL 100mg daily   - agree with amlodipine 5mg daily  - goal SBP < 130/80  - can follow up in Cardiology clinic in 4-6 weeks post discharge with Dr. Henderson        VTE Risk Mitigation (From admission, onward)        Ordered     heparin (porcine) injection 5,000 Units  Every 8 hours      08/29/19 0841     Place sequential compression device  Until discontinued      08/28/19 0525     IP VTE HIGH RISK PATIENT  Once      08/28/19 0525        Debby Nogueira MD  Cardiology  [facility]

## 2019-09-07 NOTE — SUBJECTIVE & OBJECTIVE
"Interval HPI:   Overnight events: Remains in C[facility]U, hypertension noted overnight.  BG well controlled yesterday but slightly above goal this am.  Possible discharge today to SNF.  Eatin%  Nausea: No  Hypoglycemia and intervention: No  Fever: No  TPN and/or TF: No    BP (!) 155/65 (BP Location: Left arm, Patient Position: Sitting)   Pulse 85   Temp 98.3 °F (36.8 °C) (Oral)   Resp 18   Ht 5' 5" (1.651 m)   Wt 100.6 kg (221 lb 12.5 oz)   LMP 2001 (Approximate)   SpO2 97%   Breastfeeding? No   BMI 36.91 kg/m²     Labs Reviewed and Include    Recent Labs   Lab 19  0454   *   CALCIUM 9.5      K 4.5   CO2 25      BUN 14   CREATININE 1.3     Lab Results   Component Value Date    WBC 6.14 2019    HGB 7.8 (L) 2019    HCT 25.9 (L) 2019    MCV 92 2019     2019     No results for input(s): TSH, FREET4 in the last 168 hours.  Lab Results   Component Value Date    HGBA1C 7.5 (H) 2019       Nutritional status:   Body mass index is 36.91 kg/m².  Lab Results   Component Value Date    ALBUMIN 2.5 (L) 2019    ALBUMIN 2.8 (L) 2019    ALBUMIN 3.3 (L) 2019     No results found for: PREALBUMIN    Estimated Creatinine Clearance: 45.9 mL/min (based on SCr of 1.3 mg/dL).    Accu-Checks  Recent Labs     19  0834 19  1156 19  1659 19  2052 19  0812 19  1139 19  1643 19  2156 19  0808 19  1156   POCTGLUCOSE 168* 222* 167* 145* 149* 206* 191* 163* 209* 276*       Current Medications and/or Treatments Impacting Glycemic Control  Immunotherapy:    Immunosuppressants     None        Steroids:   Hormones (From admission, onward)    None        Pressors:    Autonomic Drugs (From admission, onward)    None        Hyperglycemia/Diabetes Medications:   Antihyperglycemics (From admission, onward)    Start     Stop Route Frequency Ordered    19 1644  insulin aspart U-100 pen 12 Units   "    -- SubQ 3 times daily with meals 09/05/19 1247    09/03/19 0900  insulin detemir U-100 pen 10 Units      -- SubQ Daily 09/03/19 0741    09/03/19 0841  insulin aspart U-100 pen 0-5 Units      -- SubQ Before meals & nightly PRN 09/03/19 0741

## 2019-09-07 NOTE — ASSESSMENT & PLAN NOTE
BG goal 140 - 180     Start Levemir 10 units daily in AM  Increase Novolog to 14 units with meals  Low dose correction scale  BG monitoring AC/HS    Discharge planning: TBD, if patient discharges to SNF that allows home meds - recommend patient resume home medication regimen of Levemir 8 units q HS, Glipizide 20 mg daily, Trulicity 1.5 mg SQ once weekly.  If SNF only allows insulin therapy for DM management, recommend continuing MDI regimen while in-patient.  Will setup follow up appointment with MAURICIO Pritchard NP once patient discharges from facility.

## 2019-09-07 NOTE — ASSESSMENT & PLAN NOTE
Titrate insulin slowly to avoid hypoglycemia as the risk of hypoglycemia increases with decreased creatinine clearance.  Caution with insulin stacking  Estimated Creatinine Clearance: 45.9 mL/min (based on SCr of 1.3 mg/dL).

## 2019-09-07 NOTE — ASSESSMENT & PLAN NOTE
- s/p IV lasix 40mg x1 dose; recommend continue lasix 40mg PO daily on discharge  - continue losartan 100mg daily on discharge  - continue toprol XL 100mg daily   - agree with amlodipine 5mg daily  - goal SBP < 130/80  - can follow up in Cardiology clinic in 4-6 weeks post discharge with Dr. Henderson

## 2019-09-07 NOTE — PLAN OF CARE
Problem: Adult Inpatient Plan of Care  Goal: Plan of Care Review  Outcome: Ongoing (interventions implemented as appropriate)  Pt is AOSCAR, Ox4. Calm, cooperative. Free of falls, trauma, and injuries. Skin intact. Pt educated on treatment plan. Pt demonstrates and verbalizes understanding. VSS. Pending rehab placement. Plan of care reviewed with pt.

## 2019-09-07 NOTE — PROGRESS NOTES
"[facility]  Endocrinology  Progress Note    Admit Date: 2019     Reason for Consult: Management of T2DM, Hyperglycemia      Surgical Procedure and Date: CABG 19     Diabetes diagnosis year:      Lab Results   Component Value Date    HGBA1C 7.5 (H) 2019       Home Diabetes Medications: Levemir 8 q HS, Glipizide 20 mg daily, Trulicity 1.5 mg SQ once weekly     How often checking glucose at home? About once daily   BG readings on regimen: 160-300  Hypoglycemia on the regimen?  no  Missed doses on regimen? no     Diabetes Complications include:     Hyperglycemia and Diabetic chronic kidney disease         Complicating diabetes co morbidities:   History of MI, KAMRAN and CKD        HPI:   Patient is a 74 y.o. female with a diagnosis of DM2, KAMRAN, CKD, and CAD, who is s/p CABG on 19. Last seen in [facility] end[facility]ology clinic for DM by MAURICIO Pritchard NP on 19.  Positive family history of DM (father).  Endocrinology consulted for DM/BG management.          Interval HPI:   Overnight events: Remains in CTSU, hypertension noted overnight.  BG well controlled yesterday but slightly above goal this am.  Possible discharge today to SNF.  Eatin%  Nausea: No  Hypoglycemia and intervention: No  Fever: No  TPN and/or TF: No    BP (!) 155/65 (BP Location: Left arm, Patient Position: Sitting)   Pulse 85   Temp 98.3 °F (36.8 °C) (Oral)   Resp 18   Ht 5' 5" (1.651 m)   Wt 100.6 kg (221 lb 12.5 oz)   LMP 2001 (Approximate)   SpO2 97%   Breastfeeding? No   BMI 36.91 kg/m²      Labs Reviewed and Include    Recent Labs   Lab 19  0454   *   CALCIUM 9.5      K 4.5   CO2 25      BUN 14   CREATININE 1.3     Lab Results   Component Value Date    WBC 6.14 2019    HGB 7.8 (L) 2019    HCT 25.9 (L) 2019    MCV 92 2019     2019     No results for input(s): TSH, FREET4 in the last 168 hours.  Lab Results   Component Value Date    " HGBA1C 7.5 (H) 08/07/2019       Nutritional status:   Body mass index is 36.91 kg/m².  Lab Results   Component Value Date    ALBUMIN 2.5 (L) 08/28/2019    ALBUMIN 2.8 (L) 08/28/2019    ALBUMIN 3.3 (L) 08/07/2019     No results found for: PREALBUMIN    Estimated Creatinine Clearance: 45.9 mL/min (based on SCr of 1.3 mg/dL).    Accu-Checks  Recent Labs     09/05/19  0834 09/05/19  1156 09/05/19  1659 09/05/19  2052 09/06/19  0812 09/06/19  1139 09/06/19  1643 09/06/19  2156 09/07/19  0808 09/07/19  1156   POCTGLUCOSE 168* 222* 167* 145* 149* 206* 191* 163* 209* 276*       Current Medications and/or Treatments Impacting Glycemic Control  Immunotherapy:    Immunosuppressants     None        Steroids:   Hormones (From admission, onward)    None        Pressors:    Autonomic Drugs (From admission, onward)    None        Hyperglycemia/Diabetes Medications:   Antihyperglycemics (From admission, onward)    Start     Stop Route Frequency Ordered    09/05/19 1645  insulin aspart U-100 pen 12 Units      -- SubQ 3 times daily with meals 09/05/19 1247    09/03/19 0900  insulin detemir U-100 pen 10 Units      -- SubQ Daily 09/03/19 0741    09/03/19 0841  insulin aspart U-100 pen 0-5 Units      -- SubQ Before meals & nightly PRN 09/03/19 0741          ASSESSMENT and PLAN    * S/P CABG (coronary artery bypass graft)  Managed per primary team  Avoid hypoglycemia  Optimize BG control for surgical wound healing        Type 2 diabetes mellitus with stage 3 chronic kidney disease, without long-term current use of insulin  BG goal 140 - 180     Start Levemir 10 units daily in AM  Increase Novolog to 14 units with meals  Low dose correction scale  BG monitoring AC/HS    Discharge planning: TBD, if patient discharges to SNF that allows home meds - recommend patient resume home medication regimen of Levemir 8 units q HS, Glipizide 20 mg daily, Trulicity 1.5 mg SQ once weekly.  If SNF only allows insulin therapy for DM management, recommend  continuing MDI regimen while in-patient.  Will setup follow up appointment with MAURICIO Pritchard NP once patient discharges from facility.        CKD (chronic kidney disease) stage 3, GFR 30-59 ml/min  Titrate insulin slowly to avoid hypoglycemia as the risk of hypoglycemia increases with decreased creatinine clearance.  Caution with insulin stacking  Estimated Creatinine Clearance: 45.9 mL/min (based on SCr of 1.3 mg/dL).        Morbid obesity with body mass index (BMI) of 40.0 or higher  may contribute to insulin resistance  Body mass index is 36.91 kg/m².            Mikey Pritchard NP  Endocrinology  [facility]

## 2019-09-07 NOTE — PLAN OF CARE
Problem: Adult Inpatient Plan of Care  Goal: Plan of Care Review  LOC: alert and oriented x 4.   SKIN: The skin is warm, dry. Some discomfort in perineal area; applied barrier cream.  RESPIRATORY: Respirations are WNL, even and unlabored. Normal effort and rate noted. No accessory muscle use noted. Patient is on room air.  CARDIAC: Patient runs normal sinus on the monitor.  ABDOMEN: Soft and non tender to palpation. No distention noted.   URINARY: continent. Up to bathroom with stand by assist.  EXTREMITIES: Extremities are WNL; general weakness throughout. Pt. C/o of pain in left shoulder and a headache.  Neuro: Pt able to follow commands and is calm and cooperative with care.      POC reviewed with patient. Patient free of falls and injuries. Patient c/o of left shoulder pain at 0930 pm; gave PRN acetaminophen. Patient c/o of headache and more shoulder pain 10/10 at 0330 am; gave Tramadol. Patient waiting on SNF placement. Questions were addressed. Will continue to monitor.

## 2019-09-07 NOTE — SUBJECTIVE & OBJECTIVE
Interval History: c/o shoulder pain and htn    Review of Systems   Cardiovascular: Negative for chest pain and palpitations.   Respiratory: Negative for cough and shortness of breath.    Musculoskeletal: Positive for joint pain.     Medications:  Continuous Infusions:  Scheduled Meds:   amLODIPine  10 mg Oral Daily    aspirin  325 mg Oral Daily    atorvastatin  80 mg Oral Daily    famotidine  20 mg Oral Daily    furosemide  40 mg Oral BID    heparin (porcine)  5,000 Units Subcutaneous Q8H    insulin aspart U-100  14 Units Subcutaneous TIDWM    insulin detemir U-100  10 Units Subcutaneous Daily    losartan  100 mg Oral Daily    magnesium oxide  800 mg Oral TID    metoprolol succinate  100 mg Oral Daily    potassium chloride  20 mEq Oral BID     PRN Meds:acetaminophen, Dextrose 10% Bolus, Dextrose 10% Bolus, Dextrose 10% Bolus, glucagon (human recombinant), glucose, glucose, insulin aspart U-100, labetalol, naproxen, ondansetron, oxyCODONE, sodium chloride 0.9%, traMADol     Objective:     Vital Signs (Most Recent):  Temp: 98.3 °F (36.8 °C) (09/07/19 1115)  Pulse: 85 (09/07/19 1400)  Resp: 18 (09/07/19 1115)  BP: (!) 155/65 (09/07/19 1115)  SpO2: 97 % (09/07/19 1115) Vital Signs (24h Range):  Temp:  [98.2 °F (36.8 °C)-98.6 °F (37 °C)] 98.3 °F (36.8 °C)  Pulse:  [66-91] 85  Resp:  [16-18] 18  SpO2:  [95 %-98 %] 97 %  BP: (137-176)/(65-79) 155/65     Weight: 100.6 kg (221 lb 12.5 oz)  Body mass index is 36.91 kg/m².    SpO2: 97 %  O2 Device (Oxygen Therapy): room air    Intake/Output - Last 3 Shifts       09/05 0700 - 09/06 0659 09/06 0700 - 09/07 0659 09/07 0700 - 09/08 0659    P.O. 720 1080 360    Total Intake(mL/kg) 720 (7.1) 1080 (10.7) 360 (3.6)    Urine (mL/kg/hr) 1900 (0.8) 2200 (0.9) 600 (0.7)    Stool 2      Total Output 1902 2200 600    Net -1182 -1120 -240           Stool Occurrence 0 x            Lines/Drains/Airways     Peripheral Intravenous Line                 Peripheral IV - Single Lumen  09/04/19 2015 18 G Right Upper Arm 2 days                Physical Exam   Cardiovascular: Normal rate and regular rhythm.   Pulmonary/Chest: Effort normal and breath sounds normal.   Skin:   Incision c/d/i       Significant Labs:  All pertinent labs from the last 24 hours have been reviewed.    Significant Diagnostics:  I have reviewed all pertinent imaging results/findings within the past 24 hours.

## 2019-09-07 NOTE — SUBJECTIVE & OBJECTIVE
Interval History: No acute events overnight. BP remained elevated to 130s-170s SBP, but patient did not receive medications. Reports shoulder pain. Denies chest pain/shortness of breath.       Objective:     Vital Signs (Most Recent):  Temp: 98.3 °F (36.8 °C) (09/07/19 1115)  Pulse: 76 (09/07/19 1115)  Resp: 18 (09/07/19 1115)  BP: (!) 155/65 (09/07/19 1115)  SpO2: 97 % (09/07/19 1115) Vital Signs (24h Range):  Temp:  [98.1 °F (36.7 °C)-98.6 °F (37 °C)] 98.3 °F (36.8 °C)  Pulse:  [66-91] 76  Resp:  [16-18] 18  SpO2:  [95 %-98 %] 97 %  BP: (137-176)/(55-79) 155/65     Weight: 100.6 kg (221 lb 12.5 oz)  Body mass index is 36.91 kg/m².     SpO2: 97 %  O2 Device (Oxygen Therapy): room air      Intake/Output Summary (Last 24 hours) at 9/7/2019 1148  Last data filed at 9/7/2019 1100  Gross per 24 hour   Intake 1080 ml   Output 2800 ml   Net -1720 ml       Lines/Drains/Airways     Peripheral Intravenous Line                 Peripheral IV - Single Lumen 09/04/19 2015 18 G Right Upper Arm 2 days                Physical Exam   Constitutional: She is oriented to person, place, and time. She appears well-developed and well-nourished.   HENT:   Head: Normocephalic and atraumatic.   Eyes: Pupils are equal, round, and reactive to light. Conjunctivae are normal.   Neck: Neck supple. No JVD present.   Cardiovascular: Normal rate, regular rhythm, normal heart sounds and intact distal pulses.   Pulmonary/Chest: Effort normal and breath sounds normal.   Abdominal: Soft. Bowel sounds are normal.   Musculoskeletal: Normal range of motion. She exhibits edema.   Neurological: She is alert and oriented to person, place, and time.   Skin: Skin is warm and dry.   Psychiatric: She has a normal mood and affect.       Significant Labs:   BMP:   Recent Labs   Lab 09/06/19  0713 09/07/19  0454   * 156*    136   K 3.9 4.5    100   CO2 28 25   BUN 11 14   CREATININE 1.3 1.3   CALCIUM 9.2 9.5   MG  --  1.7   , CBC   Recent Labs    Lab 09/06/19  0713 09/07/19  0454   WBC 5.95 6.14   HGB 7.7* 7.8*   HCT 24.7* 25.9*    273    and Troponin No results for input(s): TROPONINI in the last 48 hours.

## 2019-09-07 NOTE — PROGRESS NOTES
[facility]  Cardiothoracic Surgery  Progress Note    Patient Name: Kaylee Romero  MRN: [id number]  Admission Date: 8/27/2019  Hospital Length of Stay: 10 days  Code Status: Full Code   Attending Physician: Peterson Ventura MD   Referring Provider: Self, Aaareferral  Principal Problem:S/P CABG (coronary artery bypass graft)    75yo F post op from CABG 8/12 discharged 8/21 presenting with severe sepsis, DOREEN, and Ecoli bacteremia likely 2/2 to UTI. Completed abx course and doing well. Awaiting placement.    Subjective:     Post-Op Info:  * No surgery found *         Interval History: c/o shoulder pain and htn    Review of Systems   Cardiovascular: Negative for chest pain and palpitations.   Respiratory: Negative for cough and shortness of breath.    Musculoskeletal: Positive for joint pain.     Medications:  Continuous Infusions:  Scheduled Meds:   amLODIPine  10 mg Oral Daily    aspirin  325 mg Oral Daily    atorvastatin  80 mg Oral Daily    famotidine  20 mg Oral Daily    furosemide  40 mg Oral BID    heparin (porcine)  5,000 Units Subcutaneous Q8H    insulin aspart U-100  14 Units Subcutaneous TIDWM    insulin detemir U-100  10 Units Subcutaneous Daily    losartan  100 mg Oral Daily    magnesium oxide  800 mg Oral TID    metoprolol succinate  100 mg Oral Daily    potassium chloride  20 mEq Oral BID     PRN Meds:acetaminophen, Dextrose 10% Bolus, Dextrose 10% Bolus, Dextrose 10% Bolus, glucagon (human recombinant), glucose, glucose, insulin aspart U-100, labetalol, naproxen, ondansetron, oxyCODONE, sodium chloride 0.9%, traMADol     Objective:     Vital Signs (Most Recent):  Temp: 98.3 °F (36.8 °C) (09/07/19 1115)  Pulse: 85 (09/07/19 1400)  Resp: 18 (09/07/19 1115)  BP: (!) 155/65 (09/07/19 1115)  SpO2: 97 % (09/07/19 1115) Vital Signs (24h Range):  Temp:  [98.2 °F (36.8 °C)-98.6 °F (37 °C)] 98.3 °F (36.8 °C)  Pulse:  [66-91] 85  Resp:  [16-18] 18  SpO2:  [95 %-98 %] 97 %  BP:  (137-176)/(65-79) 155/65     Weight: 100.6 kg (221 lb 12.5 oz)  Body mass index is 36.91 kg/m².    SpO2: 97 %  O2 Device (Oxygen Therapy): room air    Intake/Output - Last 3 Shifts       09/05 0700 - 09/06 0659 09/06 0700 - 09/07 0659 09/07 0700 - 09/08 0659    P.O. 720 1080 360    Total Intake(mL/kg) 720 (7.1) 1080 (10.7) 360 (3.6)    Urine (mL/kg/hr) 1900 (0.8) 2200 (0.9) 600 (0.7)    Stool 2      Total Output 1902 2200 600    Net -1182 -1120 -240           Stool Occurrence 0 x            Lines/Drains/Airways     Peripheral Intravenous Line                 Peripheral IV - Single Lumen 09/04/19 2015 18 G Right Upper Arm 2 days                Physical Exam   Cardiovascular: Normal rate and regular rhythm.   Pulmonary/Chest: Effort normal and breath sounds normal.   Skin:   Incision c/d/i       Significant Labs:  All pertinent labs from the last 24 hours have been reviewed.    Significant Diagnostics:  I have reviewed all pertinent imaging results/findings within the past 24 hours.    Assessment/Plan:     * S/P CABG (coronary artery bypass graft)  Sternal Precautions  PT/OT  Ambulate  ASA  Metop  Lasix PO    Acute blood loss anemia  H/H 7.7 from 7.1  CBC daily   Hold off transfusion for now as improving and largely dilutional from admit Hb level    Type 2 diabetes mellitus with stage 3 chronic kidney disease, without long-term current use of insulin  Endocrine following      Physical deconditioning  PT/OT   Planning for SNF placement     CKD (chronic kidney disease) stage 3, GFR 30-59 ml/min  DOREEN on CKD 3 s/p volume resuscitation  Strict I/O --> will need to assess UOP  qAM BMP  Lasix 40IV BID         Morbid obesity with body mass index (BMI) of 40.0 or higher  PT/OT   Cardiac diet     Hypertension  Increase Norvasc to 10 mg daily  Losartan 100 mg daily         Rom Cespedes MD  Cardiothoracic Surgery  [facility]

## 2019-09-08 LAB
ANION GAP SERPL CALC-SCNC: 11 MMOL/L (ref 8–16)
BASOPHILS # BLD AUTO: 0.04 K/UL (ref 0–0.2)
BASOPHILS NFR BLD: 0.6 % (ref 0–1.9)
BUN SERPL-MCNC: 16 MG/DL (ref 8–23)
CALCIUM SERPL-MCNC: 9.3 MG/DL (ref 8.7–10.5)
CHLORIDE SERPL-SCNC: 101 MMOL/L (ref 95–110)
CO2 SERPL-SCNC: 24 MMOL/L (ref 23–29)
CREAT SERPL-MCNC: 1.3 MG/DL (ref 0.5–1.4)
DIFFERENTIAL METHOD: ABNORMAL
EOSINOPHIL # BLD AUTO: 0.3 K/UL (ref 0–0.5)
EOSINOPHIL NFR BLD: 3.9 % (ref 0–8)
ERYTHROCYTE [DISTWIDTH] IN BLOOD BY AUTOMATED COUNT: 18.2 % (ref 11.5–14.5)
EST. GFR  (AFRICAN AMERICAN): 47.4 ML/MIN/1.73 M^2
EST. GFR  (NON AFRICAN AMERICAN): 41.1 ML/MIN/1.73 M^2
GLUCOSE SERPL-MCNC: 159 MG/DL (ref 70–110)
HCT VFR BLD AUTO: 24.4 % (ref 37–48.5)
HGB BLD-MCNC: 7.6 G/DL (ref 12–16)
IMM GRANULOCYTES # BLD AUTO: 0.14 K/UL (ref 0–0.04)
IMM GRANULOCYTES NFR BLD AUTO: 2.2 % (ref 0–0.5)
LYMPHOCYTES # BLD AUTO: 1.5 K/UL (ref 1–4.8)
LYMPHOCYTES NFR BLD: 23 % (ref 18–48)
MAGNESIUM SERPL-MCNC: 1.9 MG/DL (ref 1.6–2.6)
MCH RBC QN AUTO: 27.3 PG (ref 27–31)
MCHC RBC AUTO-ENTMCNC: 31.1 G/DL (ref 32–36)
MCV RBC AUTO: 88 FL (ref 82–98)
MONOCYTES # BLD AUTO: 0.5 K/UL (ref 0.3–1)
MONOCYTES NFR BLD: 8 % (ref 4–15)
NEUTROPHILS # BLD AUTO: 4 K/UL (ref 1.8–7.7)
NEUTROPHILS NFR BLD: 62.3 % (ref 38–73)
NRBC BLD-RTO: 0 /100 WBC
PHOSPHATE SERPL-MCNC: 4 MG/DL (ref 2.7–4.5)
PLATELET # BLD AUTO: 294 K/UL (ref 150–350)
PMV BLD AUTO: 9.8 FL (ref 9.2–12.9)
POCT GLUCOSE: 158 MG/DL (ref 70–110)
POCT GLUCOSE: 162 MG/DL (ref 70–110)
POCT GLUCOSE: 176 MG/DL (ref 70–110)
POCT GLUCOSE: 198 MG/DL (ref 70–110)
POTASSIUM SERPL-SCNC: 4.6 MMOL/L (ref 3.5–5.1)
RBC # BLD AUTO: 2.78 M/UL (ref 4–5.4)
SODIUM SERPL-SCNC: 136 MMOL/L (ref 136–145)
WBC # BLD AUTO: 6.4 K/UL (ref 3.9–12.7)

## 2019-09-08 PROCEDURE — 84100 ASSAY OF PHOSPHORUS: CPT

## 2019-09-08 PROCEDURE — 20600001 HC STEP DOWN PRIVATE ROOM

## 2019-09-08 PROCEDURE — 83735 ASSAY OF MAGNESIUM: CPT

## 2019-09-08 PROCEDURE — 85025 COMPLETE CBC W/AUTO DIFF WBC: CPT

## 2019-09-08 PROCEDURE — 63600175 PHARM REV CODE 636 W HCPCS: Performed by: STUDENT IN AN ORGANIZED HEALTH CARE EDUCATION/TRAINING PROGRAM

## 2019-09-08 PROCEDURE — S5571 INSULIN DISPOS PEN 3 ML: HCPCS | Performed by: NURSE PRACTITIONER

## 2019-09-08 PROCEDURE — 25000003 PHARM REV CODE 250: Performed by: SURGERY

## 2019-09-08 PROCEDURE — 25000003 PHARM REV CODE 250: Performed by: STUDENT IN AN ORGANIZED HEALTH CARE EDUCATION/TRAINING PROGRAM

## 2019-09-08 PROCEDURE — 25000003 PHARM REV CODE 250: Performed by: NURSE PRACTITIONER

## 2019-09-08 PROCEDURE — 80048 BASIC METABOLIC PNL TOTAL CA: CPT

## 2019-09-08 PROCEDURE — 63600175 PHARM REV CODE 636 W HCPCS: Performed by: NURSE PRACTITIONER

## 2019-09-08 PROCEDURE — 36415 COLL VENOUS BLD VENIPUNCTURE: CPT

## 2019-09-08 RX ADMIN — HEPARIN SODIUM 5000 UNITS: 5000 INJECTION, SOLUTION INTRAVENOUS; SUBCUTANEOUS at 06:09

## 2019-09-08 RX ADMIN — ASPIRIN 325 MG: 325 TABLET, DELAYED RELEASE ORAL at 08:09

## 2019-09-08 RX ADMIN — LOSARTAN POTASSIUM 100 MG: 50 TABLET, FILM COATED ORAL at 08:09

## 2019-09-08 RX ADMIN — HEPARIN SODIUM 5000 UNITS: 5000 INJECTION, SOLUTION INTRAVENOUS; SUBCUTANEOUS at 09:09

## 2019-09-08 RX ADMIN — ATORVASTATIN CALCIUM 80 MG: 20 TABLET, FILM COATED ORAL at 08:09

## 2019-09-08 RX ADMIN — LABETALOL HCL IV SOLN PREFILLED SYRINGE 20 MG/4ML (5 MG/ML) 10 MG: 20/4 SOLUTION PREFILLED SYRINGE at 03:09

## 2019-09-08 RX ADMIN — NAPROXEN 500 MG: 500 TABLET ORAL at 01:09

## 2019-09-08 RX ADMIN — METOPROLOL SUCCINATE 100 MG: 50 TABLET, EXTENDED RELEASE ORAL at 08:09

## 2019-09-08 RX ADMIN — INSULIN ASPART 14 UNITS: 100 INJECTION, SOLUTION INTRAVENOUS; SUBCUTANEOUS at 11:09

## 2019-09-08 RX ADMIN — MAGNESIUM OXIDE TAB 400 MG (241.3 MG ELEMENTAL MG) 800 MG: 400 (241.3 MG) TAB at 09:09

## 2019-09-08 RX ADMIN — POTASSIUM CHLORIDE 20 MEQ: 20 TABLET, EXTENDED RELEASE ORAL at 09:09

## 2019-09-08 RX ADMIN — HEPARIN SODIUM 5000 UNITS: 5000 INJECTION, SOLUTION INTRAVENOUS; SUBCUTANEOUS at 02:09

## 2019-09-08 RX ADMIN — INSULIN DETEMIR 10 UNITS: 100 INJECTION, SOLUTION SUBCUTANEOUS at 08:09

## 2019-09-08 RX ADMIN — INSULIN ASPART 14 UNITS: 100 INJECTION, SOLUTION INTRAVENOUS; SUBCUTANEOUS at 08:09

## 2019-09-08 RX ADMIN — AMLODIPINE BESYLATE 10 MG: 10 TABLET ORAL at 08:09

## 2019-09-08 RX ADMIN — ACETAMINOPHEN 1000 MG: 500 TABLET ORAL at 08:09

## 2019-09-08 RX ADMIN — POTASSIUM CHLORIDE 20 MEQ: 20 TABLET, EXTENDED RELEASE ORAL at 08:09

## 2019-09-08 RX ADMIN — FUROSEMIDE 40 MG: 40 TABLET ORAL at 08:09

## 2019-09-08 RX ADMIN — FAMOTIDINE 20 MG: 20 TABLET, FILM COATED ORAL at 08:09

## 2019-09-08 RX ADMIN — MAGNESIUM OXIDE TAB 400 MG (241.3 MG ELEMENTAL MG) 800 MG: 400 (241.3 MG) TAB at 02:09

## 2019-09-08 RX ADMIN — MAGNESIUM OXIDE TAB 400 MG (241.3 MG ELEMENTAL MG) 800 MG: 400 (241.3 MG) TAB at 08:09

## 2019-09-08 RX ADMIN — FUROSEMIDE 40 MG: 40 TABLET ORAL at 06:09

## 2019-09-08 RX ADMIN — INSULIN ASPART 14 UNITS: 100 INJECTION, SOLUTION INTRAVENOUS; SUBCUTANEOUS at 04:09

## 2019-09-08 RX ADMIN — ACETAMINOPHEN 1000 MG: 500 TABLET ORAL at 09:09

## 2019-09-08 NOTE — PLAN OF CARE
Problem: Adult Inpatient Plan of Care  Goal: Plan of Care Review  LOC: alert and oriented x 4.   SKIN: The skin is warm, dry.   RESPIRATORY: Respirations are WNL, even and unlabored. Normal effort and rate noted. No accessory muscle use noted. Patient is on room air.  CARDIAC: Patient runs normal sinus on the monitor.  ABDOMEN: Soft and non tender to palpation. No distention noted.   URINARY: continent. Up to bathroom with stand by assist.  EXTREMITIES: Extremities are WNL; general weakness throughout. Pt. C/o of pain in left shoulder.  Neuro: Pt able to follow commands and is calm and cooperative with care.      POC reviewed with patient. Patient free of falls and injuries. Patient c/o of left shoulder pain at 0100 AM; gave PRN Naproxen with moderate relief. Patient waiting on SNF placement. Questions were addressed. Will continue to monitor.

## 2019-09-08 NOTE — CARE UPDATE
BG goal 140-180     Remains in CTSU, NAEON  BG much better controlled with adjustments to MDI regimen  Plan:     Continue Levemir 10 units daily in AM  Continue Novolog 14 units with meals  Low dose correction scale  BG monitoring AC/HS     Discharge planning: TBD, if patient discharges to SNF that allows home meds - recommend patient resume home medication regimen of Levemir 8 units q HS, Glipizide 20 mg daily, Trulicity 1.5 mg SQ once weekly.  If SNF only allows insulin therapy for DM management, recommend continuing MDI regimen while in-patient.  Will setup follow up appointment with MAURICIO Pritchard NP once patient discharges from facility.     Endocrine to continue to follow     ** Please call Endocrine for any BG related issues **

## 2019-09-08 NOTE — SUBJECTIVE & OBJECTIVE
Interval History: stable L shoulder discomfort    ROS  Medications:  Continuous Infusions:  Scheduled Meds:   amLODIPine  10 mg Oral Daily    aspirin  325 mg Oral Daily    atorvastatin  80 mg Oral Daily    famotidine  20 mg Oral Daily    furosemide  40 mg Oral BID    heparin (porcine)  5,000 Units Subcutaneous Q8H    insulin aspart U-100  14 Units Subcutaneous TIDWM    insulin detemir U-100  10 Units Subcutaneous Daily    losartan  100 mg Oral Daily    magnesium oxide  800 mg Oral TID    metoprolol succinate  100 mg Oral Daily    potassium chloride  20 mEq Oral BID     PRN Meds:acetaminophen, Dextrose 10% Bolus, Dextrose 10% Bolus, Dextrose 10% Bolus, glucagon (human recombinant), glucose, glucose, insulin aspart U-100, labetalol, naproxen, ondansetron, oxyCODONE, sodium chloride 0.9%, traMADol     Objective:     Vital Signs (Most Recent):  Temp: 98.6 °F (37 °C) (19)  Pulse: 76 (19)  Resp: 18 (19)  BP: 128/63 (19)  SpO2: 97 % (19) Vital Signs (24h Range):  Temp:  [96.5 °F (35.8 °C)-98.7 °F (37.1 °C)] 98.6 °F (37 °C)  Pulse:  [69-88] 76  Resp:  [16-18] 18  SpO2:  [94 %-99 %] 97 %  BP: (126-158)/(63-84) 128/63     Weight: 100.1 kg (220 lb 10.9 oz)  Body mass index is 36.72 kg/m².    SpO2: 97 %  O2 Device (Oxygen Therapy): room air    Intake/Output - Last 3 Shifts       700 -  -  - 659    P.O. 1080 1150 720    Total Intake(mL/kg) 1080 (10.7) 1150 (11.5) 720 (7.2)    Urine (mL/kg/hr) 2200 (0.9) 2500 (1) 800 (0.7)    Stool       Total Output 2200 2500 800    Net -1120 -1350 -80                 Lines/Drains/Airways     Peripheral Intravenous Line                 Peripheral IV - Single Lumen 19 18 G Right Upper Arm 3 days                Physical Exam  Incision c/d/i; reg rate and rhythm    Significant Labs:  All pertinent labs from the last 24 hours have been reviewed.  Ma.9  Hb:  7.9  Significant Diagnostics:  I have reviewed all pertinent imaging results/findings within the past 24 hours.

## 2019-09-08 NOTE — PROGRESS NOTES
[facility]  Cardiothoracic Surgery  Progress Note    Patient Name: Kaylee Romero  MRN: [id number]  Admission Date: 8/27/2019  Hospital Length of Stay: 11 days  Code Status: Full Code   Attending Physician: Peterson Ventura MD   Referring Provider: Self, Aaareferral  Principal Problem:S/P CABG (coronary artery bypass graft)     75yo F post op from CABG 8/12 discharged 8/21 presenting with severe sepsis, DOREEN, and Ecoli bacteremia likely 2/2 to UTI. Completed abx course and doing well. Awaiting placement.    Subjective:     Post-Op Info:  * No surgery found *         Interval History: stable L shoulder discomfort    ROS  Medications:  Continuous Infusions:  Scheduled Meds:   amLODIPine  10 mg Oral Daily    aspirin  325 mg Oral Daily    atorvastatin  80 mg Oral Daily    famotidine  20 mg Oral Daily    furosemide  40 mg Oral BID    heparin (porcine)  5,000 Units Subcutaneous Q8H    insulin aspart U-100  14 Units Subcutaneous TIDWM    insulin detemir U-100  10 Units Subcutaneous Daily    losartan  100 mg Oral Daily    magnesium oxide  800 mg Oral TID    metoprolol succinate  100 mg Oral Daily    potassium chloride  20 mEq Oral BID     PRN Meds:acetaminophen, Dextrose 10% Bolus, Dextrose 10% Bolus, Dextrose 10% Bolus, glucagon (human recombinant), glucose, glucose, insulin aspart U-100, labetalol, naproxen, ondansetron, oxyCODONE, sodium chloride 0.9%, traMADol     Objective:     Vital Signs (Most Recent):  Temp: 98.6 °F (37 °C) (09/08/19 1621)  Pulse: 76 (09/08/19 1621)  Resp: 18 (09/08/19 1621)  BP: 128/63 (09/08/19 1621)  SpO2: 97 % (09/08/19 1621) Vital Signs (24h Range):  Temp:  [96.5 °F (35.8 °C)-98.7 °F (37.1 °C)] 98.6 °F (37 °C)  Pulse:  [69-88] 76  Resp:  [16-18] 18  SpO2:  [94 %-99 %] 97 %  BP: (126-158)/(63-84) 128/63     Weight: 100.1 kg (220 lb 10.9 oz)  Body mass index is 36.72 kg/m².    SpO2: 97 %  O2 Device (Oxygen Therapy): room air    Intake/Output - Last 3 Shifts        700 -  0659 700 -  0659  07 -  0659    P.O. 1080 1150 720    Total Intake(mL/kg) 1080 (10.7) 1150 (11.5) 720 (7.2)    Urine (mL/kg/hr) 2200 (0.9) 2500 (1) 800 (0.7)    Stool       Total Output 2200 2500 800    Net -1120 -1350 -80                 Lines/Drains/Airways     Peripheral Intravenous Line                 Peripheral IV - Single Lumen 19 18 G Right Upper Arm 3 days                Physical Exam  Incision c/d/i; reg rate and rhythm    Significant Labs:  All pertinent labs from the last 24 hours have been reviewed.  Ma.9  Hb: 7.9  Significant Diagnostics:  I have reviewed all pertinent imaging results/findings within the past 24 hours.    Assessment/Plan:     * S/P CABG (coronary artery bypass graft)  Sternal Precautions  PT/OT  Ambulate  ASA  Metop  Statin  Lasix PO    Acute blood loss anemia  H/H 7.6  CBC daily     Type 2 diabetes mellitus with stage 3 chronic kidney disease, without long-term current use of insulin  Endocrine following      Physical deconditioning  PT/OT   Planning for SNF placement     CKD (chronic kidney disease) stage 3, GFR 30-59 ml/min  DOREEN on CKD 3 s/p volume resuscitation  Strict I/O --> will need to assess UOP  qAM BMP  Lasix PO         Morbid obesity with body mass index (BMI) of 40.0 or higher  PT/OT   Cardiac diet     Hypertension  Continue Norvasc 10 mg daily  Losartan 100 mg daily         Rom Cespedes MD  Cardiothoracic Surgery  [facility]

## 2019-09-08 NOTE — SIGNIFICANT EVENT
Patient . Gave PRN Labetalol.     Patient SBP came down to 149. Will continue to monitor.

## 2019-09-09 ENCOUNTER — HOSPITAL ENCOUNTER (INPATIENT)
Facility: HOSPITAL | Age: 74
LOS: 7 days | Discharge: HOME HEALTH | DRG: 872 | End: 2019-09-16
Attending: INTERNAL MEDICINE | Admitting: INTERNAL MEDICINE
Payer: MEDICARE

## 2019-09-09 VITALS
BODY MASS INDEX: 36.55 KG/M2 | RESPIRATION RATE: 18 BRPM | HEART RATE: 78 BPM | SYSTOLIC BLOOD PRESSURE: 133 MMHG | OXYGEN SATURATION: 96 % | TEMPERATURE: 96 F | HEIGHT: 65 IN | DIASTOLIC BLOOD PRESSURE: 72 MMHG | WEIGHT: 219.38 LBS

## 2019-09-09 DIAGNOSIS — A41.51 E. COLI SEPSIS: ICD-10-CM

## 2019-09-09 DIAGNOSIS — R07.9 CHEST PAIN: ICD-10-CM

## 2019-09-09 DIAGNOSIS — R65.10 SIRS (SYSTEMIC INFLAMMATORY RESPONSE SYNDROME): ICD-10-CM

## 2019-09-09 LAB
ANION GAP SERPL CALC-SCNC: 13 MMOL/L (ref 8–16)
BASOPHILS # BLD AUTO: 0.04 K/UL (ref 0–0.2)
BASOPHILS NFR BLD: 0.6 % (ref 0–1.9)
BUN SERPL-MCNC: 19 MG/DL (ref 8–23)
CALCIUM SERPL-MCNC: 9.7 MG/DL (ref 8.7–10.5)
CHLORIDE SERPL-SCNC: 102 MMOL/L (ref 95–110)
CO2 SERPL-SCNC: 22 MMOL/L (ref 23–29)
CREAT SERPL-MCNC: 1.7 MG/DL (ref 0.5–1.4)
DIFFERENTIAL METHOD: ABNORMAL
EOSINOPHIL # BLD AUTO: 0.3 K/UL (ref 0–0.5)
EOSINOPHIL NFR BLD: 4 % (ref 0–8)
ERYTHROCYTE [DISTWIDTH] IN BLOOD BY AUTOMATED COUNT: 18.5 % (ref 11.5–14.5)
EST. GFR  (AFRICAN AMERICAN): 34.2 ML/MIN/1.73 M^2
EST. GFR  (NON AFRICAN AMERICAN): 29.7 ML/MIN/1.73 M^2
GLUCOSE SERPL-MCNC: 137 MG/DL (ref 70–110)
HCT VFR BLD AUTO: 30.6 % (ref 37–48.5)
HGB BLD-MCNC: 9 G/DL (ref 12–16)
IMM GRANULOCYTES # BLD AUTO: 0.11 K/UL (ref 0–0.04)
IMM GRANULOCYTES NFR BLD AUTO: 1.6 % (ref 0–0.5)
LYMPHOCYTES # BLD AUTO: 1.8 K/UL (ref 1–4.8)
LYMPHOCYTES NFR BLD: 27.4 % (ref 18–48)
MAGNESIUM SERPL-MCNC: 2.1 MG/DL (ref 1.6–2.6)
MCH RBC QN AUTO: 27.2 PG (ref 27–31)
MCHC RBC AUTO-ENTMCNC: 29.4 G/DL (ref 32–36)
MCV RBC AUTO: 92 FL (ref 82–98)
MONOCYTES # BLD AUTO: 0.4 K/UL (ref 0.3–1)
MONOCYTES NFR BLD: 6.5 % (ref 4–15)
NEUTROPHILS # BLD AUTO: 4 K/UL (ref 1.8–7.7)
NEUTROPHILS NFR BLD: 59.9 % (ref 38–73)
NRBC BLD-RTO: 0 /100 WBC
PHOSPHATE SERPL-MCNC: 4.1 MG/DL (ref 2.7–4.5)
PLATELET # BLD AUTO: 329 K/UL (ref 150–350)
PMV BLD AUTO: 9.9 FL (ref 9.2–12.9)
POCT GLUCOSE: 155 MG/DL (ref 70–110)
POCT GLUCOSE: 158 MG/DL (ref 70–110)
POCT GLUCOSE: 226 MG/DL (ref 70–110)
POCT GLUCOSE: 231 MG/DL (ref 70–110)
POTASSIUM SERPL-SCNC: 5.2 MMOL/L (ref 3.5–5.1)
RBC # BLD AUTO: 3.31 M/UL (ref 4–5.4)
SODIUM SERPL-SCNC: 137 MMOL/L (ref 136–145)
WBC # BLD AUTO: 6.72 K/UL (ref 3.9–12.7)

## 2019-09-09 PROCEDURE — 84100 ASSAY OF PHOSPHORUS: CPT

## 2019-09-09 PROCEDURE — 97116 GAIT TRAINING THERAPY: CPT

## 2019-09-09 PROCEDURE — 97802 MEDICAL NUTRITION INDIV IN: CPT

## 2019-09-09 PROCEDURE — 25000003 PHARM REV CODE 250: Performed by: PHYSICIAN ASSISTANT

## 2019-09-09 PROCEDURE — 80048 BASIC METABOLIC PNL TOTAL CA: CPT

## 2019-09-09 PROCEDURE — S5571 INSULIN DISPOS PEN 3 ML: HCPCS | Performed by: PHYSICIAN ASSISTANT

## 2019-09-09 PROCEDURE — 83735 ASSAY OF MAGNESIUM: CPT

## 2019-09-09 PROCEDURE — 25000003 PHARM REV CODE 250: Performed by: SURGERY

## 2019-09-09 PROCEDURE — 11000004 HC SNF PRIVATE

## 2019-09-09 PROCEDURE — 36415 COLL VENOUS BLD VENIPUNCTURE: CPT

## 2019-09-09 PROCEDURE — 25000003 PHARM REV CODE 250: Performed by: NURSE PRACTITIONER

## 2019-09-09 PROCEDURE — 85025 COMPLETE CBC W/AUTO DIFF WBC: CPT

## 2019-09-09 PROCEDURE — 63600175 PHARM REV CODE 636 W HCPCS: Performed by: NURSE PRACTITIONER

## 2019-09-09 PROCEDURE — 25000003 PHARM REV CODE 250: Performed by: STUDENT IN AN ORGANIZED HEALTH CARE EDUCATION/TRAINING PROGRAM

## 2019-09-09 PROCEDURE — 63600175 PHARM REV CODE 636 W HCPCS: Performed by: PHYSICIAN ASSISTANT

## 2019-09-09 PROCEDURE — 63600175 PHARM REV CODE 636 W HCPCS: Performed by: STUDENT IN AN ORGANIZED HEALTH CARE EDUCATION/TRAINING PROGRAM

## 2019-09-09 RX ORDER — ONDANSETRON 4 MG/1
8 TABLET, ORALLY DISINTEGRATING ORAL EVERY 8 HOURS PRN
Status: CANCELLED | OUTPATIENT
Start: 2019-09-09

## 2019-09-09 RX ORDER — OXYCODONE HYDROCHLORIDE 5 MG/1
5 TABLET ORAL EVERY 4 HOURS PRN
Status: CANCELLED | OUTPATIENT
Start: 2019-09-09

## 2019-09-09 RX ORDER — METOPROLOL SUCCINATE 50 MG/1
100 TABLET, EXTENDED RELEASE ORAL DAILY
Status: CANCELLED | OUTPATIENT
Start: 2019-09-10

## 2019-09-09 RX ORDER — ACETAMINOPHEN 325 MG/1
650 TABLET ORAL EVERY 6 HOURS PRN
Status: CANCELLED | OUTPATIENT
Start: 2019-09-09

## 2019-09-09 RX ORDER — NAPROXEN 500 MG/1
500 TABLET ORAL EVERY 12 HOURS PRN
Status: CANCELLED | OUTPATIENT
Start: 2019-09-09

## 2019-09-09 RX ORDER — ATORVASTATIN CALCIUM 20 MG/1
80 TABLET, FILM COATED ORAL DAILY
Status: DISCONTINUED | OUTPATIENT
Start: 2019-09-10 | End: 2019-09-16 | Stop reason: HOSPADM

## 2019-09-09 RX ORDER — LOSARTAN POTASSIUM 50 MG/1
100 TABLET ORAL DAILY
Status: CANCELLED | OUTPATIENT
Start: 2019-09-10

## 2019-09-09 RX ORDER — HYDRALAZINE HYDROCHLORIDE 25 MG/1
25 TABLET, FILM COATED ORAL EVERY 8 HOURS PRN
Status: DISCONTINUED | OUTPATIENT
Start: 2019-09-09 | End: 2019-09-16 | Stop reason: HOSPADM

## 2019-09-09 RX ORDER — LANOLIN ALCOHOL/MO/W.PET/CERES
800 CREAM (GRAM) TOPICAL 3 TIMES DAILY
Status: CANCELLED | OUTPATIENT
Start: 2019-09-09

## 2019-09-09 RX ORDER — ASPIRIN 325 MG
325 TABLET, DELAYED RELEASE (ENTERIC COATED) ORAL DAILY
Status: CANCELLED | OUTPATIENT
Start: 2019-09-10

## 2019-09-09 RX ORDER — GLUCAGON 1 MG
1 KIT INJECTION
Status: DISCONTINUED | OUTPATIENT
Start: 2019-09-09 | End: 2019-09-12

## 2019-09-09 RX ORDER — FUROSEMIDE 20 MG/1
40 TABLET ORAL DAILY
Qty: 60 TABLET | Refills: 11 | Status: ON HOLD | OUTPATIENT
Start: 2019-09-09 | End: 2019-09-16 | Stop reason: SDUPTHER

## 2019-09-09 RX ORDER — ONDANSETRON 8 MG/1
8 TABLET, ORALLY DISINTEGRATING ORAL EVERY 8 HOURS PRN
Status: DISCONTINUED | OUTPATIENT
Start: 2019-09-09 | End: 2019-09-09

## 2019-09-09 RX ORDER — LABETALOL HCL 20 MG/4 ML
10 SYRINGE (ML) INTRAVENOUS EVERY 6 HOURS PRN
Status: CANCELLED | OUTPATIENT
Start: 2019-09-09

## 2019-09-09 RX ORDER — IBUPROFEN 200 MG
24 TABLET ORAL
Status: DISCONTINUED | OUTPATIENT
Start: 2019-09-09 | End: 2019-09-12

## 2019-09-09 RX ORDER — LOSARTAN POTASSIUM 50 MG/1
100 TABLET ORAL DAILY
Status: DISCONTINUED | OUTPATIENT
Start: 2019-09-10 | End: 2019-09-16 | Stop reason: HOSPADM

## 2019-09-09 RX ORDER — AMOXICILLIN 250 MG
1 CAPSULE ORAL 2 TIMES DAILY
Status: CANCELLED | OUTPATIENT
Start: 2019-09-09

## 2019-09-09 RX ORDER — CALCIUM CARBONATE 200(500)MG
500 TABLET,CHEWABLE ORAL 2 TIMES DAILY PRN
Status: DISCONTINUED | OUTPATIENT
Start: 2019-09-09 | End: 2019-09-16 | Stop reason: HOSPADM

## 2019-09-09 RX ORDER — IBUPROFEN 200 MG
16 TABLET ORAL
Status: CANCELLED | OUTPATIENT
Start: 2019-09-09

## 2019-09-09 RX ORDER — POTASSIUM CHLORIDE 20 MEQ/1
20 TABLET, EXTENDED RELEASE ORAL 2 TIMES DAILY
Status: DISCONTINUED | OUTPATIENT
Start: 2019-09-09 | End: 2019-09-11

## 2019-09-09 RX ORDER — ASPIRIN 325 MG
325 TABLET, DELAYED RELEASE (ENTERIC COATED) ORAL DAILY
Status: DISCONTINUED | OUTPATIENT
Start: 2019-09-10 | End: 2019-09-16 | Stop reason: HOSPADM

## 2019-09-09 RX ORDER — GLIPIZIDE 5 MG/1
20 TABLET ORAL
Status: DISCONTINUED | OUTPATIENT
Start: 2019-09-10 | End: 2019-09-16 | Stop reason: HOSPADM

## 2019-09-09 RX ORDER — FAMOTIDINE 20 MG/1
20 TABLET, FILM COATED ORAL DAILY
Status: CANCELLED | OUTPATIENT
Start: 2019-09-10

## 2019-09-09 RX ORDER — CALCIUM CARBONATE 200(500)MG
500 TABLET,CHEWABLE ORAL 2 TIMES DAILY PRN
Status: CANCELLED | OUTPATIENT
Start: 2019-09-09

## 2019-09-09 RX ORDER — ATORVASTATIN CALCIUM 20 MG/1
80 TABLET, FILM COATED ORAL DAILY
Status: CANCELLED | OUTPATIENT
Start: 2019-09-10

## 2019-09-09 RX ORDER — FAMOTIDINE 20 MG/1
20 TABLET, FILM COATED ORAL DAILY
Status: DISCONTINUED | OUTPATIENT
Start: 2019-09-10 | End: 2019-09-16 | Stop reason: HOSPADM

## 2019-09-09 RX ORDER — GLIPIZIDE 10 MG/1
20 TABLET ORAL
Status: CANCELLED | OUTPATIENT
Start: 2019-09-10

## 2019-09-09 RX ORDER — IBUPROFEN 200 MG
16 TABLET ORAL
Status: DISCONTINUED | OUTPATIENT
Start: 2019-09-09 | End: 2019-09-12

## 2019-09-09 RX ORDER — AMOXICILLIN 250 MG
1 CAPSULE ORAL 2 TIMES DAILY
Status: DISCONTINUED | OUTPATIENT
Start: 2019-09-09 | End: 2019-09-11

## 2019-09-09 RX ORDER — ACETAMINOPHEN 500 MG
1000 TABLET ORAL EVERY 6 HOURS PRN
Status: CANCELLED | OUTPATIENT
Start: 2019-09-09

## 2019-09-09 RX ORDER — NAPROXEN 500 MG/1
500 TABLET ORAL EVERY 12 HOURS PRN
Status: DISCONTINUED | OUTPATIENT
Start: 2019-09-09 | End: 2019-09-11

## 2019-09-09 RX ORDER — ACETAMINOPHEN 325 MG/1
650 TABLET ORAL EVERY 6 HOURS PRN
Status: DISCONTINUED | OUTPATIENT
Start: 2019-09-09 | End: 2019-09-16 | Stop reason: HOSPADM

## 2019-09-09 RX ORDER — ACETAMINOPHEN 500 MG
1000 TABLET ORAL EVERY 6 HOURS PRN
Status: DISCONTINUED | OUTPATIENT
Start: 2019-09-09 | End: 2019-09-16 | Stop reason: HOSPADM

## 2019-09-09 RX ORDER — LANOLIN ALCOHOL/MO/W.PET/CERES
800 CREAM (GRAM) TOPICAL 3 TIMES DAILY
Status: DISCONTINUED | OUTPATIENT
Start: 2019-09-09 | End: 2019-09-11

## 2019-09-09 RX ORDER — RAMELTEON 8 MG/1
8 TABLET ORAL NIGHTLY PRN
Status: CANCELLED | OUTPATIENT
Start: 2019-09-09

## 2019-09-09 RX ORDER — POTASSIUM CHLORIDE 20 MEQ/1
20 TABLET, EXTENDED RELEASE ORAL 2 TIMES DAILY
Status: CANCELLED | OUTPATIENT
Start: 2019-09-09

## 2019-09-09 RX ORDER — ONDANSETRON 4 MG/1
4 TABLET, ORALLY DISINTEGRATING ORAL EVERY 8 HOURS PRN
Status: CANCELLED | OUTPATIENT
Start: 2019-09-09

## 2019-09-09 RX ORDER — IBUPROFEN 200 MG
24 TABLET ORAL
Status: CANCELLED | OUTPATIENT
Start: 2019-09-09

## 2019-09-09 RX ORDER — OXYCODONE HYDROCHLORIDE 5 MG/1
5 TABLET ORAL EVERY 4 HOURS PRN
Status: DISCONTINUED | OUTPATIENT
Start: 2019-09-09 | End: 2019-09-16 | Stop reason: HOSPADM

## 2019-09-09 RX ORDER — GLUCAGON 1 MG
1 KIT INJECTION
Status: CANCELLED | OUTPATIENT
Start: 2019-09-09

## 2019-09-09 RX ORDER — AMLODIPINE BESYLATE 10 MG/1
10 TABLET ORAL DAILY
Status: DISCONTINUED | OUTPATIENT
Start: 2019-09-10 | End: 2019-09-16 | Stop reason: HOSPADM

## 2019-09-09 RX ORDER — METOPROLOL SUCCINATE 50 MG/1
100 TABLET, EXTENDED RELEASE ORAL DAILY
Status: DISCONTINUED | OUTPATIENT
Start: 2019-09-10 | End: 2019-09-16 | Stop reason: HOSPADM

## 2019-09-09 RX ORDER — ONDANSETRON 4 MG/1
4 TABLET, ORALLY DISINTEGRATING ORAL EVERY 8 HOURS PRN
Status: DISCONTINUED | OUTPATIENT
Start: 2019-09-09 | End: 2019-09-16 | Stop reason: HOSPADM

## 2019-09-09 RX ORDER — AMLODIPINE BESYLATE 10 MG/1
10 TABLET ORAL DAILY
Status: CANCELLED | OUTPATIENT
Start: 2019-09-10

## 2019-09-09 RX ORDER — LABETALOL HYDROCHLORIDE 5 MG/ML
10 INJECTION, SOLUTION INTRAVENOUS EVERY 6 HOURS PRN
Status: DISCONTINUED | OUTPATIENT
Start: 2019-09-09 | End: 2019-09-09

## 2019-09-09 RX ADMIN — POTASSIUM CHLORIDE 20 MEQ: 20 TABLET, EXTENDED RELEASE ORAL at 08:09

## 2019-09-09 RX ADMIN — FUROSEMIDE 40 MG: 40 TABLET ORAL at 05:09

## 2019-09-09 RX ADMIN — OXYCODONE HYDROCHLORIDE 5 MG: 5 TABLET ORAL at 07:09

## 2019-09-09 RX ADMIN — INSULIN DETEMIR 10 UNITS: 100 INJECTION, SOLUTION SUBCUTANEOUS at 08:09

## 2019-09-09 RX ADMIN — OXYCODONE HYDROCHLORIDE 5 MG: 5 TABLET ORAL at 05:09

## 2019-09-09 RX ADMIN — INSULIN ASPART 14 UNITS: 100 INJECTION, SOLUTION INTRAVENOUS; SUBCUTANEOUS at 07:09

## 2019-09-09 RX ADMIN — FUROSEMIDE 40 MG: 40 TABLET ORAL at 08:09

## 2019-09-09 RX ADMIN — LOSARTAN POTASSIUM 100 MG: 50 TABLET, FILM COATED ORAL at 08:09

## 2019-09-09 RX ADMIN — FAMOTIDINE 20 MG: 20 TABLET, FILM COATED ORAL at 08:09

## 2019-09-09 RX ADMIN — HEPARIN SODIUM 5000 UNITS: 5000 INJECTION, SOLUTION INTRAVENOUS; SUBCUTANEOUS at 01:09

## 2019-09-09 RX ADMIN — ACETAMINOPHEN 1000 MG: 500 TABLET ORAL at 03:09

## 2019-09-09 RX ADMIN — ASPIRIN 325 MG: 325 TABLET, DELAYED RELEASE ORAL at 08:09

## 2019-09-09 RX ADMIN — Medication 800 MG: at 09:09

## 2019-09-09 RX ADMIN — METOPROLOL SUCCINATE 100 MG: 50 TABLET, EXTENDED RELEASE ORAL at 08:09

## 2019-09-09 RX ADMIN — INSULIN ASPART 14 UNITS: 100 INJECTION, SOLUTION INTRAVENOUS; SUBCUTANEOUS at 11:09

## 2019-09-09 RX ADMIN — POTASSIUM CHLORIDE 20 MEQ: 1500 TABLET, EXTENDED RELEASE ORAL at 10:09

## 2019-09-09 RX ADMIN — ATORVASTATIN CALCIUM 80 MG: 20 TABLET, FILM COATED ORAL at 08:09

## 2019-09-09 RX ADMIN — INSULIN ASPART 2 UNITS: 100 INJECTION, SOLUTION INTRAVENOUS; SUBCUTANEOUS at 11:09

## 2019-09-09 RX ADMIN — INSULIN ASPART 14 UNITS: 100 INJECTION, SOLUTION INTRAVENOUS; SUBCUTANEOUS at 04:09

## 2019-09-09 RX ADMIN — HEPARIN SODIUM 5000 UNITS: 5000 INJECTION, SOLUTION INTRAVENOUS; SUBCUTANEOUS at 05:09

## 2019-09-09 RX ADMIN — MAGNESIUM OXIDE TAB 400 MG (241.3 MG ELEMENTAL MG) 800 MG: 400 (241.3 MG) TAB at 03:09

## 2019-09-09 RX ADMIN — MAGNESIUM OXIDE TAB 400 MG (241.3 MG ELEMENTAL MG) 800 MG: 400 (241.3 MG) TAB at 08:09

## 2019-09-09 RX ADMIN — AMLODIPINE BESYLATE 10 MG: 10 TABLET ORAL at 08:09

## 2019-09-09 RX ADMIN — INSULIN DETEMIR 8 UNITS: 100 INJECTION, SOLUTION SUBCUTANEOUS at 09:09

## 2019-09-09 RX ADMIN — NAPROXEN 500 MG: 500 TABLET ORAL at 01:09

## 2019-09-09 NOTE — PLAN OF CARE
Problem: Adult Inpatient Plan of Care  Goal: Plan of Care Review  Outcome: Ongoing (interventions implemented as appropriate)  Possible d/c tomorrow with SNF. BG managed AC+HS with insulin per orders. Complained of 6/10 aching pain in the left shoulder. Administered 1000mg of Tylenol. Full pain relief was obtained. Pt ambulated to the bathroom with assistance. Family at bedside. Pt in bed at lowest position with wheels locked, 2x upper side rails raised, call light within reach. No falls, SOB, VSS. Will continue to monitor.

## 2019-09-09 NOTE — PT/OT/SLP PROGRESS
Physical Therapy Treatment    Patient Name:  Kaylee Romero   MRN:  [id number]  Admitting Diagnosis:  S/P CABG (coronary artery bypass graft)   Recent Surgery: * No surgery found *    Admit Date: 8/27/2019  Length of Stay: 12 days    Recommendations:     Discharge Recommendations:  rehabilitation facility   Discharge Equipment Recommendations: none   Barriers to discharge: None    Assessment:     Kaylee Romero is a 74 y.o. female admitted with a medical diagnosis of S/P CABG (coronary artery bypass graft).  She presents with the following impairments/functional limitations:  weakness, impaired endurance, gait instability, impaired balance, decreased safety awareness, impaired cardiopulmonary response to activity, pain, decreased lower extremity function, decreased upper extremity function. Pt tolerated session well today. Pt was willing to ambulate with PT today and was able to ambulate 2 x 125 ft with one standing rest break. Pt demonstrates instability in gait, most likely due to difficulty with dual-task processing. When pt is trying to talk to someone or look around/move objects out of the way, pt demonstrates large path deviation and LOB/unsteadiness. Pt is able to self-correct but requires CGA for pt's safety. When pt is given vc's to focus on ambulating only pt demonstrates no LOB. Pt continues to require cueing for sternal precautions and occasionally attempts to push/pull on heavy objects and req's vc's for reminder. Pt is unsafe to d/c home at this time and could benefit from continued PT at a skilled level of care.    Rehab Prognosis: Good; patient would benefit from acute skilled PT services to address these deficits and reach maximum level of function.    Recent Surgery: * No surgery found *      Plan:     During this hospitalization, patient to be seen 3 x/week to address the identified rehab impairments via gait training, therapeutic activities, therapeutic exercises, neuromuscular re-education  "and progress toward the following goals:    · Plan of Care Expires:  10/01/19    Subjective     RN notified prior to session. No additional parties present upon PT entrance into room.    Chief Complaint: "Let's see if we can walk without waking up this shoulder"  Patient/Family Comments/goals: Return home safely  Pain/Comfort:  · Pain Rating 1: other (see comments)(pt did not rate)  · Location - Side 1: Left  · Location 1: shoulder  · Pain Addressed 1: Distraction, Reposition, Nurse notified  · Pain Rating Post-Intervention 1: other (see comments)(pt did not rate)      Objective:     Patient found up in chair with: telemetry   Mental Status: Patient is oriented to AxOx4 and follows multi-step commands. Patient is Alert and Cooperative during session.    General Precautions: Standard, Cardiac fall, sternal   Orthopedic Precautions:N/A   Braces: N/A   Body mass index is 36.5 kg/m².  Oxygen Device: Room Air      Outcome Measures:  AM-PAC 6 CLICK MOBILITY  Turning over in bed (including adjusting bedclothes, sheets and blankets)?: 3  Sitting down on and standing up from a chair with arms (e.g., wheelchair, bedside commode, etc.): 3  Moving from lying on back to sitting on the side of the bed?: 3  Moving to and from a bed to a chair (including a wheelchair)?: 3  Need to walk in hospital room?: 3  Climbing 3-5 steps with a railing?: 2  Basic Mobility Total Score: 17     Functional Mobility:  Additional staff present: none  Bed Mobility:   · Pt found/returned to bedside chair      Transfers:   · Sit <> Stand Transfer: stand by assistance with no assistive device   · Stand <> Sit Transfer: supervision with no assistive device   · [id number] from bedside chair    Gait:  · Patient ambulated: 2 x 125 ft   · Patient required: contact guard  · Patient used:  no assistive device   · Gait Pattern observed: swing through  · Gait Deviation(s): unsteady gait, wide base of support, decreased weight shift, decreased arm swing, shuffle " gait and decreased chantell  · Impairments due to: impaired balance and decreased strength  · Comments: Pt w/ multiple LOB throughout amb. But was able to self correct. When pt is instructed to slow down with ambulation and vc's are given to focus, pt is able to ambulate with no LOB    Stairs:  Not assessed    Therapeutic Activities & Exercises:   1. Sternal Precautions: patient able to voice 1/3 precautions without assistance. Patient able to maintain precautions throughout session with minimal verbal cues.    Education:  All questions answered within PT scope of practice  PT role in POC  Safe to perform step transfer to/from chair CGA and no AD w/ nursing/PCT  Importance of OOB tolerance to improve lung ventilation and expansion as well as strengthen postural musculature  PT was present with  informed pt that she was denied rehab placement but did get accepted into SNF.  PT provided empathetic and active listening throughout and answered questions from a therapy standpoint that pt directed towards PT    Patient left up in chair with all lines intact, call button in reach, RN notified and  present.    GOALS:   Multidisciplinary Problems     Physical Therapy Goals        Problem: Physical Therapy Goal    Goal Priority Disciplines Outcome Goal Variances Interventions   Physical Therapy Goal     PT, PT/OT Ongoing (interventions implemented as appropriate)     Description:  Goals to be met by: 19     Patient will increase functional independence with mobility by performin. Supine to sit with Stand-by Assistance within sternal precautions; HOB flat - Not met  2. Sit to supine with Stand-by Assistance within sternal precautions; HOB flat - Not met  3. Sit to stand transfer with Mod (I) within sternal precautions - Not met  4. Bed to chair transfer with Supervision without device within sternal precautions - Not met  5. Gait  x 200 feet with Stand-by Assistance - Not met  6.  Ascend/descend 4 stairs with right Handrail with Contact Guard Assistance - Not met  7. Lower extremity exercise program x 20 reps per handout, with independence - Not met                   Time Tracking:     PT Received On: 09/09/19  PT Start Time: 0923     PT Stop Time: 0938  PT Total Time (min): 15 min       Billable Minutes:   · Gait Training 15    Treatment Type: Treatment  PT/PTA: PT       Rosita Matthews PT, DPT  9/9/2019   Pager: [phone]

## 2019-09-09 NOTE — CARE UPDATE
BG goal 140-180     Remains in CTSU, NAEON  BG well controlled yesterday, mildly elevated at lunch today requiring correction scale  Noted creatinine of 1.7 this am  Plan:     Continue Levemir 10 units daily in AM  Continue Novolog 14 units with meals  Low dose correction scale  BG monitoring AC/HS     Discharge planning: TBD, if patient discharges to SNF that allows home meds - recommend patient resume home medication regimen of Levemir 8 units q HS, Glipizide 20 mg daily, Trulicity 1.5 mg SQ once weekly.  If SNF only allows insulin therapy for DM management, recommend continuing MDI regimen while in-patient.  Will setup follow up appointment with MAURICIO Pritchard NP once patient discharges from facility.     Endocrine to continue to follow     ** Please call Endocrine for any BG related issues **

## 2019-09-09 NOTE — PLAN OF CARE
Problem: Adult Inpatient Plan of Care  Goal: Plan of Care Review  Problem: Adult Inpatient Plan of Care  Goal: Plan of Care Review  LOC: alert and oriented x 4.   SKIN: The skin is warm, dry.   RESPIRATORY: Respirations are WNL. Patient is on room air. Uses home c-pap HS.   CARDIAC: NSR.  ABDOMEN: Soft and non tender to palpation. No distention noted.   URINARY: continent. Up to bathroom with stand by assist.  EXTREMITIES: Extremities are WNL; general weakness notes. Pt. C/o of pain in left shoulder.  NEURO: Pt able to follow commands and is calm and cooperative with care.      POC reviewed with patient. Patient free of falls and injuries. Patient c/o of left shoulder pain, PRN Naproxen given with moderate relief. Patient waiting on SNF placement. Questions were addressed. Will continue to monitor.

## 2019-09-09 NOTE — PLAN OF CARE
Problem: Physical Therapy Goal  Goal: Physical Therapy Goal  Goals to be met by: 19     Patient will increase functional independence with mobility by performin. Supine to sit with Stand-by Assistance within sternal precautions; HOB flat - Not met  2. Sit to supine with Stand-by Assistance within sternal precautions; HOB flat - Not met  3. Sit to stand transfer with Mod (I) within sternal precautions - Not met  4. Bed to chair transfer with Supervision without device within sternal precautions - Not met  5. Gait  x 200 feet with Stand-by Assistance - Not met  6. Ascend/descend 4 stairs with right Handrail with Contact Guard Assistance - Not met  7. Lower extremity exercise program x 20 reps per handout, with independence - Not met    Outcome: Ongoing (interventions implemented as appropriate)    Discharge Recommendation: Rehab.  0 goals met today. PT goals still appropriate.  Appropriate transfer level with nursing staff: Bed <> Chair:  Step Transfer with contact guard assistance with No Assistive Device.    Rosita Matthews PT, DPT  2019  Pager: [phone]

## 2019-09-09 NOTE — PROGRESS NOTES
[facility]  Physical Medicine & Rehab  Progress Note    Patient Name: Kaylee Romero  MRN: [id number]  Admission Date: 8/27/2019  Length of Stay: 12 days  Attending Physician: Peterson Ventura MD    Subjective:     Principal Problem:S/P CABG (coronary artery bypass graft)    Hospital Course:   09/04/2019: BM Tyler. Sit to stand and transfers SBA-SV.  Ambulated 2 x 100 ft SBA w/ occasional standing rest breaks 2/2 reports of fatigue.  ADLs SV.   09/05/2019: Bed mobility SBA-CGA .  Sit to stand and transfers SBA-Mod (I). Ambulated 2 x 100ft CGA-SBA.   Grooming/toileting SV-(I).  09/06/2019: Sit to stand SBA and transfers SBA.  Ambulated 125 ft CGA. LBD Tyler.    Interval History 9/9/2019:  Patient is seen for follow-up rehab evaluation and recommendations: Participating with therapy.    HPI, Past Medical, Family, and Social History remains the same as documented in the initial encounter.    Scheduled Medications:    amLODIPine  10 mg Oral Daily    aspirin  325 mg Oral Daily    atorvastatin  80 mg Oral Daily    famotidine  20 mg Oral Daily    furosemide  40 mg Oral BID    heparin (porcine)  5,000 Units Subcutaneous Q8H    insulin aspart U-100  14 Units Subcutaneous TIDWM    insulin detemir U-100  10 Units Subcutaneous Daily    losartan  100 mg Oral Daily    magnesium oxide  800 mg Oral TID    metoprolol succinate  100 mg Oral Daily    potassium chloride  20 mEq Oral BID       Diagnostic Results: Labs: Reviewed    PRN Medications: acetaminophen, Dextrose 10% Bolus, Dextrose 10% Bolus, Dextrose 10% Bolus, glucagon (human recombinant), glucose, glucose, insulin aspart U-100, labetalol, naproxen, ondansetron, oxyCODONE, sodium chloride 0.9%, traMADol    Review of Systems   Constitutional: Positive for activity change. Negative for fatigue and fever.   HENT: Negative for trouble swallowing and voice change.    Eyes: Negative for photophobia and visual disturbance.   Respiratory: Negative for  cough and shortness of breath.    Cardiovascular: Positive for leg swelling. Negative for chest pain and palpitations.   Gastrointestinal: Negative for abdominal distention, nausea and vomiting.   Genitourinary: Negative for difficulty urinating and flank pain.   Musculoskeletal: Positive for arthralgias and gait problem.   Skin: Positive for wound (surgical). Negative for rash.   Neurological: Positive for weakness. Negative for facial asymmetry, speech difficulty, numbness and headaches.   Psychiatric/Behavioral: Negative for agitation and confusion.     Objective:     Vital Signs (Most Recent):  Temp: 97.7 °F (36.5 °C) (09/09/19 1103)  Pulse: 79 (09/09/19 1103)  Resp: 18 (09/09/19 1103)  BP: (!) 140/73 (09/09/19 1103)  SpO2: 96 % (09/09/19 1103)    Vital Signs (24h Range):  Temp:  [96.5 °F (35.8 °C)-98.6 °F (37 °C)] 97.7 °F (36.5 °C)  Pulse:  [69-88] 79  Resp:  [18-20] 18  SpO2:  [95 %-98 %] 96 %  BP: (126-164)/(63-74) 140/73     Physical Exam   Constitutional: She is oriented to person, place, and time. She appears well-developed and well-nourished.   HENT:   Head: Normocephalic and atraumatic.   Eyes: Right eye exhibits no discharge. Left eye exhibits no discharge.   Neck: Neck supple.   Cardiovascular: Intact distal pulses.   BLE edema    Pulmonary/Chest: Effort normal. No respiratory distress.   Abdominal: Soft. There is no tenderness.   Musculoskeletal: She exhibits no edema or deformity.   Generalized deconditioning  L shoulder discomfort   Neurological: She is alert and oriented to person, place, and time. No sensory deficit. She exhibits normal muscle tone.   Follows commands    Skin: Skin is warm and dry.   Sternal incision   Psychiatric: She has a normal mood and affect. Her behavior is normal.   Vitals reviewed.    Assessment/Plan:      * S/P CABG (coronary artery bypass graft)  -s/p CABG 8/12/19  -on sternal precautions     Acute blood loss anemia  -CTS monitoring     Physical  deconditioning    Recommendations  -  Encourage mobility, OOB in chair at least 3 hours per day, and early ambulation as appropriate  -  PT/OT evaluate and treat  -  Pain management  -  Monitor for and prevent skin breakdown and pressure ulcers  · Early mobility, repositioning/weight shifting every 20-30 minutes when sitting, turn patient every 2 hours, proper mattress/overlay and chair cushioning, pressure relief/heel protector boots  -  DVT prophylaxis    -  Reviewed discharge options (IP rehab, SNF, HH therapy, and OP therapy)    CKD (chronic kidney disease) stage 3, GFR 30-59 ml/min  -DOREEN on CKD 3 s/p volume resuscitation  -receiving Lasix 40 mg PO BID     Hypertension  -Cards consulted and managing     Continue w/ recommendation for inpatient rehab to maximize overall function and endurance. Insurance denied IRF.     Elvia Brewer NP  Department of Physical Medicine & Rehab   [facility]

## 2019-09-09 NOTE — SUBJECTIVE & OBJECTIVE
Interval History 9/9/2019:  Patient is seen for follow-up rehab evaluation and recommendations: Participating with therapy.    HPI, Past Medical, Family, and Social History remains the same as documented in the initial encounter.    Scheduled Medications:    amLODIPine  10 mg Oral Daily    aspirin  325 mg Oral Daily    atorvastatin  80 mg Oral Daily    famotidine  20 mg Oral Daily    furosemide  40 mg Oral BID    heparin (porcine)  5,000 Units Subcutaneous Q8H    insulin aspart U-100  14 Units Subcutaneous TIDWM    insulin detemir U-100  10 Units Subcutaneous Daily    losartan  100 mg Oral Daily    magnesium oxide  800 mg Oral TID    metoprolol succinate  100 mg Oral Daily    potassium chloride  20 mEq Oral BID       Diagnostic Results: Labs: Reviewed    PRN Medications: acetaminophen, Dextrose 10% Bolus, Dextrose 10% Bolus, Dextrose 10% Bolus, glucagon (human recombinant), glucose, glucose, insulin aspart U-100, labetalol, naproxen, ondansetron, oxyCODONE, sodium chloride 0.9%, traMADol    Review of Systems   Constitutional: Positive for activity change. Negative for fatigue and fever.   HENT: Negative for trouble swallowing and voice change.    Eyes: Negative for photophobia and visual disturbance.   Respiratory: Negative for cough and shortness of breath.    Cardiovascular: Positive for leg swelling. Negative for chest pain and palpitations.   Gastrointestinal: Negative for abdominal distention, nausea and vomiting.   Genitourinary: Negative for difficulty urinating and flank pain.   Musculoskeletal: Positive for arthralgias and gait problem.   Skin: Positive for wound (surgical). Negative for rash.   Neurological: Positive for weakness. Negative for facial asymmetry, speech difficulty, numbness and headaches.   Psychiatric/Behavioral: Negative for agitation and confusion.     Objective:     Vital Signs (Most Recent):  Temp: 97.7 °F (36.5 °C) (09/09/19 1103)  Pulse: 79 (09/09/19 1103)  Resp: 18  (09/09/19 1103)  BP: (!) 140/73 (09/09/19 1103)  SpO2: 96 % (09/09/19 1103)    Vital Signs (24h Range):  Temp:  [96.5 °F (35.8 °C)-98.6 °F (37 °C)] 97.7 °F (36.5 °C)  Pulse:  [69-88] 79  Resp:  [18-20] 18  SpO2:  [95 %-98 %] 96 %  BP: (126-164)/(63-74) 140/73     Physical Exam   Constitutional: She is oriented to person, place, and time. She appears well-developed and well-nourished.   HENT:   Head: Normocephalic and atraumatic.   Eyes: Right eye exhibits no discharge. Left eye exhibits no discharge.   Neck: Neck supple.   Cardiovascular: Intact distal pulses.   BLE edema    Pulmonary/Chest: Effort normal. No respiratory distress.   Abdominal: Soft. There is no tenderness.   Musculoskeletal: She exhibits no edema or deformity.   Generalized deconditioning  L shoulder discomfort   Neurological: She is alert and oriented to person, place, and time. No sensory deficit. She exhibits normal muscle tone.   Follows commands    Skin: Skin is warm and dry.   Sternal incision   Psychiatric: She has a normal mood and affect. Her behavior is normal.   Vitals reviewed.    NEUROLOGICAL EXAMINATION:     MENTAL STATUS   Oriented to person, place, and time.

## 2019-09-09 NOTE — PROGRESS NOTES
"[facility]  Adult Nutrition  Progress Note    SUMMARY       Recommendations    Recommendation:   1. Continue cardiac diet as tolerated.   2. If PO intake < 50%, recommend Boost Plus TID.   3. RD to monitor & follow up.    Goals: Adequate PO intake to meet % of EEN and EPN x 7 days  Nutrition Goal Status: new  Communication of RD Recs: other (comment)(POC)    Reason for Assessment    Reason For Assessment: length of stay  Diagnosis: (s/p CABG)  Relevant Medical History: DM 2, CKD, HLD  Interdisciplinary Rounds: did not attend  General Information Comments: Pt reports her appetite has been poor s/p surgery but just started coming back last weekend. Pt reports eating % of meals in the past few days and endorses good appetite/intake prior to surgery. Pt endorses wt loss of a pound or 2 since admission and states her UBW is 225 lbs. Wt loss noted since admission likely fluid as pt is -2.7L. Wt has been stable ~220-230 lbs x 5 months. Pt appears nourished, RD does not feel that NFPE is indicated at this time.  Nutrition Discharge Planning: d/c on cardiac diet    Nutrition Risk Screen    Nutrition Risk Screen: no indicators present    Nutrition/Diet History    Spiritual, Cultural Beliefs, Religion Practices, Values that Affect Care: no    Anthropometrics    Temp: 96.2 °F (35.7 °C)  Height Method: Estimated  Height: 5' 5" (165.1 cm)  Height (inches): 65 in  Weight Method: Standard Scale  Weight: 99.5 kg (219 lb 5.7 oz)  Weight (lb): 219.36 lb  Ideal Body Weight (IBW), Female: 125 lb  % Ideal Body Weight, Female (lb): 176 lb  BMI (Calculated): 36.7    Lab/Procedures/Meds    Pertinent Labs Reviewed: reviewed  Pertinent Labs Comments: K 5.2, Cr 1.7, GFR 34.2, Glu 137  Pertinent Medications Reviewed: reviewed  Pertinent Medications Comments: statin, famotidnie, lasix, heparin, insulin, metoprolol    Estimated/Assessed Needs    Weight Used For Calorie Calculations: 99.5 kg (219 lb 5.7 oz)  Energy " Calorie Requirements (kcal): 1506 kcal/day  Energy Need Method: [place]-St Jeor(no AF 2/2 obesity)  Protein Requirements:  g/day(0.8-1 g/kg)  Weight Used For Protein Calculations: 99.5 kg (219 lb 5.7 oz)  Fluid Requirements (mL): per MD or 1 mL/kcal     RDA Method (mL): 1506  CHO Requirement: 188g CHO      Nutrition Prescription Ordered    Current Diet Order: Cardiac    Evaluation of Received Nutrient/Fluid Intake    I/O: -2.7L since admit  Energy Calories Required: meeting needs  Protein Required: meeting needs  Fluid Required: meeting needs  Comments: LBM 9/9  Tolerance: tolerating  % Intake of Estimated Energy Needs: 75 - 100 %  % Meal Intake: 75 - 100 %    Nutrition Risk    Level of Risk/Frequency of Follow-up: (1x/week)     Assessment and Plan    Nutrition Problem  Increased nutrient needs    Related to (etiology):   Physiological need 2/2 wound healing    Signs and Symptoms (as evidenced by):   s/p CABG 8/12    Interventions (treatment strategy):  Collaboration of care with other providers    Nutrition Diagnosis Status:   New     Monitor and Evaluation    Food and Nutrient Intake: energy intake, food and beverage intake  Food and Nutrient Adminstration: diet order  Anthropometric Measurements: weight, weight change, body mass index  Biochemical Data, Medical Tests and Procedures: electrolyte and renal panel, gastrointestinal profile, glucose/endocrine profile, inflammatory profile, lipid profile  Nutrition-Focused Physical Findings: overall appearance     Malnutrition Assessment  Pt does not meet criteria for malnutrition at this time.    Nutrition Follow-Up    RD Follow-up?: Yes

## 2019-09-09 NOTE — PLAN OF CARE
09/09/19 1143   Discharge Assessment   Assessment Type Discharge Planning Reassessment   Discharge Plan A Skilled Nursing Facility   Discharge Plan B Home with family;Home Health   DME Needed Upon Discharge  none   Patient/Family in Agreement with Plan yes     Met with the patient in room 3075 to discuss discharge planning. PHN has denied IRF admission and recommend SS SNF. SW spoke with Sarah Brooke RN admissions coordinator for [facility]. They have a bed available for today. Discussed the difference in the levels of care from acute to sub acute and gave Mrs. Romero her [facility] packet with the written information explaining the differences in the levels of care. The patient is okay with the denial for IRF and understands why she qualifies for SNF instead. Will continue to follow and help with discharge planning throughout admission.

## 2019-09-09 NOTE — DISCHARGE SUMMARY
[facility]  Cardiothoracic Surgery  Discharge Summary      Patient Name: Kaylee Romero  MRN: [id number]  Admission Date: 8/27/2019  Hospital Length of Stay: 12 days  Discharge Date and Time:  09/09/2019 11:32 AM  Attending Physician: Peterson Ventura MD   Discharging Provider: Catherine Baker PA-C  Primary Care Provider: Liz Roberts MD    HPI:   74-year-old female to the ER for evaluation generalized malaise, fatigue, weakness, decreased p.o. appetite, multiple falls.  EMS contacted by the patient's .  The patient had a CABG 2 weeks ago performed here with Dr. Ventura.  Since the CABG she has had decreased p.o. intake nausea vomiting and weakness.  Yesterday the patient had a mechanical slip and fall secondary to not feeling well and feeling weak, she fell and hit her face, there was no LOC.   She fell out of the bed again and hit her face again, she also complains of pain to both shoulders.  She presents to the ER febrile, tachycardic, tachypneic, and hypoxic. EMS as the patient on 2 L nasal cannula with oxygenation around of 90s.  On room air her oxygenation was low 80s.     * No surgery found *     Hospital Course: 73yo F post op from CABG 8/12 discharged 8/21 presenting with severe sepsis, DOREEN, and Ecoli bacteremia likely 2/2 to UTI. Completed abx course and doing well. Will discharge to SNF today, 9/9/19.     Consults (From admission, onward)        Status Ordering Provider     Inpatient consult to Cardiology  Once     Provider:  (Not yet assigned)    Completed AILEEN ANDESRON     Inpatient consult to Endocrinology  Once     Provider:  (Not yet assigned)    Completed JAIRO BRADY     Inpatient consult to Physical Medicine Rehab  Once     Provider:  (Not yet assigned)    Completed AILEEN ANDERSON     Inpatient consult to PICC team (NIAS)  Once     Provider:  (Not yet assigned)    PETERSON He     Inpatient consult to [facility]  Once      Provider:  (Not yet assigned)    Acknowledged AILEEN ANDERSON            No new Assessment & Plan notes have been filed under this hospital service since the last note was generated.  Service: Cardiothoracic Surgery    Final Active Diagnoses:    Diagnosis Date Noted POA    PRINCIPAL PROBLEM:  S/P CABG (coronary artery bypass graft) [Z95.1] 08/12/2019 Not Applicable    Acute blood loss anemia [D62] 08/14/2019 Yes    Type 2 diabetes mellitus with stage 3 chronic kidney disease, without long-term current use of insulin [E11.22, N18.3] 05/16/2019 Yes    Physical deconditioning [R53.81] 11/05/2018 Yes    CKD (chronic kidney disease) stage 3, GFR 30-59 ml/min [N18.3] 04/20/2017 Yes    KAMRAN on CPAP [G47.33, Z99.89] 06/28/2016 Not Applicable    Morbid obesity with body mass index (BMI) of 40.0 or higher [E66.01] 05/09/2016 Yes    Hypertension [I10] 11/28/2012 Yes      Problems Resolved During this Admission:    Diagnosis Date Noted Date Resolved POA    Sepsis [A41.9] 08/29/2019 09/03/2019 Yes    Sepsis [A41.9] 08/28/2019 08/29/2019 Yes    Hypophosphatemia [E83.39] 08/14/2019 09/03/2019 Yes      Discharged Condition: stable    Disposition: Nursing Facility    Follow Up:  Follow-up Information     Peterson Ventura MD In 3 weeks.    Specialty:  Cardiothoracic Surgery  Contact information:  [address] [place] [address] [id number] [phone]                 Patient Instructions:   No discharge procedures on file.     Medications:  See discharge readmit orders     Time spent on the discharge of patient: 35 minutes    Catherine Baker PA-C  Cardiothoracic Surgery  [facility]

## 2019-09-09 NOTE — PLAN OF CARE
09/09/19 1638   Post-Acute Status   Post-Acute Authorization Placement   Post-Acute Placement Status Set-up Complete     Pt was accepted to transfer today to [facility].  NETTIE set up a wc van for transport through the [facility] x [phone] for 5:20pm.  SW updated the pt.  Pt had questions regarding shoulder injection. CM working on f/u appt for pt.    Mar [place], [facility] x [phone]

## 2019-09-09 NOTE — NURSING
PIV removed x1. Telemetry removed. AVS provided and patient verbalized understanding. Pt transported to SNF via wheelchair.

## 2019-09-10 ENCOUNTER — OFFICE VISIT (OUTPATIENT)
Dept: ORTHOPEDICS | Facility: CLINIC | Age: 74
DRG: 872 | End: 2019-09-10
Attending: INTERNAL MEDICINE
Payer: MEDICARE

## 2019-09-10 ENCOUNTER — TELEPHONE (OUTPATIENT)
Dept: ORTHOPEDICS | Facility: CLINIC | Age: 74
End: 2019-09-10

## 2019-09-10 VITALS — DIASTOLIC BLOOD PRESSURE: 94 MMHG | SYSTOLIC BLOOD PRESSURE: 148 MMHG | HEART RATE: 75 BPM

## 2019-09-10 DIAGNOSIS — G89.29 CHRONIC LEFT SHOULDER PAIN: Primary | ICD-10-CM

## 2019-09-10 DIAGNOSIS — M25.512 CHRONIC LEFT SHOULDER PAIN: Primary | ICD-10-CM

## 2019-09-10 LAB
POCT GLUCOSE: 154 MG/DL (ref 70–110)
POCT GLUCOSE: 161 MG/DL (ref 70–110)
POCT GLUCOSE: 76 MG/DL (ref 70–110)
POCT GLUCOSE: 93 MG/DL (ref 70–110)
POCT GLUCOSE: 94 MG/DL (ref 70–110)

## 2019-09-10 PROCEDURE — 99213 PR OFFICE/OUTPT VISIT, EST, LEVL III, 20-29 MIN: ICD-10-PCS | Mod: S$GLB,,, | Performed by: PHYSICIAN ASSISTANT

## 2019-09-10 PROCEDURE — 99999 PR PBB SHADOW E&M-EST. PATIENT-LVL V: ICD-10-PCS | Mod: PBBFAC,,, | Performed by: PHYSICIAN ASSISTANT

## 2019-09-10 PROCEDURE — 3077F PR MOST RECENT SYSTOLIC BLOOD PRESSURE >= 140 MM HG: ICD-10-PCS | Mod: CPTII,S$GLB,, | Performed by: PHYSICIAN ASSISTANT

## 2019-09-10 PROCEDURE — 11000004 HC SNF PRIVATE

## 2019-09-10 PROCEDURE — 99499 RISK ADDL DX/OHS AUDIT: ICD-10-PCS | Mod: S$GLB,,, | Performed by: PHYSICIAN ASSISTANT

## 2019-09-10 PROCEDURE — 93010 ELECTROCARDIOGRAM REPORT: CPT | Mod: ,,, | Performed by: INTERNAL MEDICINE

## 2019-09-10 PROCEDURE — 97162 PT EVAL MOD COMPLEX 30 MIN: CPT

## 2019-09-10 PROCEDURE — 97116 GAIT TRAINING THERAPY: CPT

## 2019-09-10 PROCEDURE — 93010 EKG 12-LEAD: ICD-10-PCS | Mod: ,,, | Performed by: INTERNAL MEDICINE

## 2019-09-10 PROCEDURE — 25000003 PHARM REV CODE 250: Performed by: PHYSICIAN ASSISTANT

## 2019-09-10 PROCEDURE — 97530 THERAPEUTIC ACTIVITIES: CPT

## 2019-09-10 PROCEDURE — 3080F DIAST BP >= 90 MM HG: CPT | Mod: CPTII,S$GLB,, | Performed by: PHYSICIAN ASSISTANT

## 2019-09-10 PROCEDURE — 97165 OT EVAL LOW COMPLEX 30 MIN: CPT

## 2019-09-10 PROCEDURE — 99499 UNLISTED E&M SERVICE: CPT | Mod: S$GLB,,, | Performed by: PHYSICIAN ASSISTANT

## 2019-09-10 PROCEDURE — 3077F SYST BP >= 140 MM HG: CPT | Mod: CPTII,S$GLB,, | Performed by: PHYSICIAN ASSISTANT

## 2019-09-10 PROCEDURE — 99213 OFFICE O/P EST LOW 20 MIN: CPT | Mod: S$GLB,,, | Performed by: PHYSICIAN ASSISTANT

## 2019-09-10 PROCEDURE — 1101F PT FALLS ASSESS-DOCD LE1/YR: CPT | Mod: CPTII,S$GLB,, | Performed by: PHYSICIAN ASSISTANT

## 2019-09-10 PROCEDURE — 93005 ELECTROCARDIOGRAM TRACING: CPT

## 2019-09-10 PROCEDURE — 97110 THERAPEUTIC EXERCISES: CPT

## 2019-09-10 PROCEDURE — 1101F PR PT FALLS ASSESS DOC 0-1 FALLS W/OUT INJ PAST YR: ICD-10-PCS | Mod: CPTII,S$GLB,, | Performed by: PHYSICIAN ASSISTANT

## 2019-09-10 PROCEDURE — 3080F PR MOST RECENT DIASTOLIC BLOOD PRESSURE >= 90 MM HG: ICD-10-PCS | Mod: CPTII,S$GLB,, | Performed by: PHYSICIAN ASSISTANT

## 2019-09-10 PROCEDURE — 99999 PR PBB SHADOW E&M-EST. PATIENT-LVL V: CPT | Mod: PBBFAC,,, | Performed by: PHYSICIAN ASSISTANT

## 2019-09-10 PROCEDURE — 97535 SELF CARE MNGMENT TRAINING: CPT

## 2019-09-10 RX ADMIN — POTASSIUM CHLORIDE 20 MEQ: 1500 TABLET, EXTENDED RELEASE ORAL at 08:09

## 2019-09-10 RX ADMIN — Medication 800 MG: at 08:09

## 2019-09-10 RX ADMIN — ASPIRIN 325 MG: 325 TABLET, COATED ORAL at 08:09

## 2019-09-10 RX ADMIN — OXYCODONE HYDROCHLORIDE 5 MG: 5 TABLET ORAL at 10:09

## 2019-09-10 RX ADMIN — ACETAMINOPHEN 1000 MG: 500 TABLET ORAL at 08:09

## 2019-09-10 RX ADMIN — AMLODIPINE BESYLATE 10 MG: 10 TABLET ORAL at 08:09

## 2019-09-10 RX ADMIN — Medication 800 MG: at 02:09

## 2019-09-10 RX ADMIN — LOSARTAN POTASSIUM 100 MG: 50 TABLET, FILM COATED ORAL at 08:09

## 2019-09-10 RX ADMIN — METOPROLOL SUCCINATE 100 MG: 50 TABLET, EXTENDED RELEASE ORAL at 08:09

## 2019-09-10 RX ADMIN — ACETAMINOPHEN 1000 MG: 500 TABLET ORAL at 04:09

## 2019-09-10 RX ADMIN — GLIPIZIDE 20 MG: 5 TABLET ORAL at 08:09

## 2019-09-10 RX ADMIN — ACETAMINOPHEN 1000 MG: 500 TABLET ORAL at 10:09

## 2019-09-10 RX ADMIN — OXYCODONE HYDROCHLORIDE 5 MG: 5 TABLET ORAL at 08:09

## 2019-09-10 RX ADMIN — OXYCODONE HYDROCHLORIDE 5 MG: 5 TABLET ORAL at 02:09

## 2019-09-10 RX ADMIN — FAMOTIDINE 20 MG: 20 TABLET, FILM COATED ORAL at 08:09

## 2019-09-10 RX ADMIN — ATORVASTATIN CALCIUM 80 MG: 20 TABLET, FILM COATED ORAL at 08:09

## 2019-09-10 NOTE — PLAN OF CARE
09/10/19 0921   Final Note   Assessment Type Final Discharge Note   Anticipated Discharge Disposition SNF   What phone number can be called within the next 1-3 days to see how you are doing after discharge? [phone]   Hospital Follow Up  Appt(s) scheduled? Yes   Discharge plans and expectations educations in teach back method with documentation complete? Yes   Right Care Referral Info   Post Acute Recommendation SNF / Sub-Acute Rehab   Referral Type Short Stay Skilled Nursing Facility   Facility Name [facility]     Pt discharge to [facility] yesterday. Ambulatory referral to Hand surgery for Elvia ALCALA to see the patient for possible steroid injection for her chronic left shoulder pain completed. Will follow up with the hand surgery clinic today to see if the patient can be seen and SNF can transport her to the appt.    Future Appointments   Date Time Provider Department Center   9/11/2019 11:00 AM EKG, APPT [facility] EKG [facility]   9/11/2019 11:30 AM [facility] [id number] 485 LB LIMIT [facility] XRAY OP [facility]   9/11/2019 12:00 PM Peterson Ventura MD [facility] CARDVAS [facility]   9/17/2019  1:30 PM Pop Henderson MD [facility] CARDIO [facility]   10/4/2019  1:30 PM [facility] [place]

## 2019-09-10 NOTE — PROGRESS NOTES
[facility] [address] [facility]pt. assisted into bed.per PCT.will give report to oncoming nurse.

## 2019-09-10 NOTE — TELEPHONE ENCOUNTER
----- Message from Brooklyn Roberson sent at 9/10/2019 11:24 AM CDT -----  Contact: Self      -------FST Request------      Name of Who is Calling: GERARDO HOLM [[id number]]      What is the request in detail: Pt states she is experiencing severe left shoulder pain. Pt states she needs to be seen today. Please contact to further discuss and advise.        Can the clinic reply by [facility]: N      What [facility]: [phone]

## 2019-09-10 NOTE — PLAN OF CARE
Problem: Occupational Therapy Goal  Goal: Occupational Therapy Goal  Goals to be met by: 9/24/19     Patient will increase functional independence with ADLs by performing:    Pt to perform the following by d/c::    Bed mobility with Mod I.  Toileting with Mod I.  Bathing with Mod I.  UBD with Mod I.  LBD with Mod I.  Grooming with Mod I.  B UE exercise program x 15 mins.  Functional standing activity x 10 mins.      Outcome: Ongoing (interventions implemented as appropriate)  SHE Nash/FARRUKH      9/10/2019

## 2019-09-10 NOTE — TELEPHONE ENCOUNTER
Spoke with pt and she is schedule for an appt today at [facility] at 4pm.

## 2019-09-10 NOTE — PT/OT/SLP EVAL
PhysicalTherapy   Evaluation    Kaylee Romero   MRN: [id number]     PT Received On: 09/10/19  PT Start Time: 1024     PT Stop Time: 1115    PT Total Time (min): 51 min       Billable Minutes:  Evaluation 11, Gait Training 15, Therapeutic Activity 10, Therapeutic Exercise 15 and Total Time 40    Diagnosis: recent CABG 19; pt was D/C'd home but then w/ multiple falls and new admit to [facility] w/ Sepsis, DOREEN, and UIT  Past Medical History:   Diagnosis Date    Acid reflux     Age-related osteoporosis without current pathological fracture 2019    Cataract     CKD (chronic kidney disease) stage 3, GFR 30-59 ml/min     Dry mouth     History of TIA (transient ischemic attack) 2018    Hyperlipidemia 2014    Hypertensive heart disease with diastolic heart failure 2012    Morbid obesity with body mass index (BMI) of 40.0 or higher 2016    KAMRAN (obstructive sleep apnea)     KAMRAN on CPAP 2016    Osteoporosis without current pathological fracture 2018    Physical deconditioning 2018    Status post total left knee replacement 2017    Type 2 diabetes mellitus with stage 3 chronic kidney disease, without long-term current use of insulin 2019      Past Surgical History:   Procedure Laterality Date    ankle surgery (l)      APPENDECTOMY       SECTION      CORONARY ARTERY BYPASS GRAFT (CABG)X3 N/A 2019    [facility]    DILATION AND CURETTAGE OF UTERUS      KNEE ARTHROSCOPY W/ DEBRIDEMENT      KNEE SURGERY Left 2017    TKR    Left heart cath Left 2019    Performed by Ronak Gibbons [facility] at [facility] CATH LAB    REPLACEMENT-KNEE-TOTAL Left 2017    [facility]    SURGICAL PROCUREMENT, VEIN, ENDOSCOPIC Left 2019    Performed by [facility] at [facility]         General Precautions: Standard, fall, sternal  Orthopedic  Precautions: N/A   Braces: N/A    Spiritual, Cultural Beliefs, Mormon Practices, Values that Affect Care: no    Patient History:  Lives With: spouse, child(rebeca), adult  Living Arrangements: house  Home Accessibility: stairs to enter home  Home Layout: Able to live on 1st floor  Stair Railings at Home: outside, present on left side  Transportation Anticipated: family or friend will provide  Living Environment Comment: Pt lives w/ her , son, and daughter-in-law in a 1SH w/ 4 PAUL and LHR. She has a tub/shower combo. Pt's  is available and able to assist pt as needed upon D/C.   Equipment Currently Used at Home: cane, straight  DME owned (not currently used): shower chair and wheelchair    Previous Level of Function: Prior to CABG 8/12 pt was IND w/ mobility and ADLs using SPC only as needed for community distances. She was also driving. Pt reports that she had multiple falls prior to CABG due to slips and trips outside. She was home for less than 1 week following CABG. During this time pt did not ambulate due to weakness. She completed transfers but experienced multiple falls including sliding OOB and falling forward off of toilet. Pt's  was assisting her w/ ADLs during this time.   Ambulation Skills: needs device  Transfer Skills: independent  ADL Skills: independent    Subjective:  Communicated with patient prior to session.  Pt agreeable to session but reporting (L) shoulder pain. Pt able to recall all sternal precautions w/o cues.  Chief Complaint: left shoulder pain  Patient goals: return to PLOF    Pain/Comfort  Pain Rating 1: 9/10  Location - Side 1: Left  Location - Orientation 1: generalized  Location 1: shoulder  Pain Addressed 1: Pre-medicate for activity, Reposition, Nurse notified  Pain Rating Post-Intervention 1: 9/10    Objective:  Patient found supine in bed.      Cognitive Exam:  Oriented to: Person, Place, Time and Situation  Follows Commands/attention: Follows multistep   commands  Communication: clear/fluent  Safety awareness/insight to disability: intact    Physical Exam:  Postural examination/scapula alignment:    -       Rounded shoulders    Skin integrity: Visible skin intact  Edema: Mild LLE    Sensation:      -       Intact    Upper Extremity Range of Motion:  Right Upper Extremity: WFL  Left Upper Extremity: WFL    Upper Extremity Strength:  Not tested due to sternal precautions    Lower Extremity Range of Motion:  Right Lower Extremity: WFL  Left Lower Extremity: WFL    Lower Extremity Strength:  Right Lower Extremity: WFL  Left Lower Extremity: WFL     Fine motor coordination:     -       Intact  Left hand thumb/finger opposition skills and Right hand thumb/finger opposition skills    Gross motor coordination: WFL      AM-PAC 6 CLICK MOBILITY  Total Score:16    Bed Mobility:  Supine>Sit: on bed w/ ModA for trunk  HOB flat and w/o use of side rail    Transfers:  Sit<>Stand: to/from w/c (2 trials) w/o AD w/ SBA  Stand Pivot Transfer: EOB>w/c and w/c>bedside chair; both w/o AD w/ SBA  Pt maintains sternal precautions w/o cues needed    Gait:  Amb 150ft w/o AD w/ CGA for safety  Lateral instability/sway noted but no LOB  Swing through gait pattern       Advanced Gait:  Stairs: asc/andre 4 steps w/o HR w/ Min/CGA for stability  Cueing for step to gait pattern    Balance:  Static sit EOB w/ CGA/SBA, pt swaying forw/back and left/right due to shoulder pain  Static stand w/o AD w/ CGA for safety    Additional Treatment:  Seated LBE x15min to inc overall strength/endurance  Pt was educated on PT role and POC, use of call light, and importance of sternal precautions. Pt verb understanding.    Patient left up in chair with call button in reach.    Assessment:  Kaylee Romero is a 74 y.o. female with a medical diagnosis of recent CABG 8/12/19; pt was D/C'd home but then w/ multiple falls and new admit to [facility] w/ Sepsis, DOREEN, and UTI. Pt was limited t/o session by left shoulder pain.  Prior to recent CABG she was IND w/ all mobility. She is currently limited by the below listed deficits and requires CGA/SBA for safety w/ transfers, gait, and stairs. She is appropriate for skilled PT and will begin PT POC.    Rehab identified problem list/impairments: impaired endurance, impaired sensation, impaired self care skills, impaired functional mobilty, gait instability, impaired balance, decreased upper extremity function, decreased lower extremity function, pain, edema, impaired cardiopulmonary response to activity, orthopedic precautions    Rehab potential is good.    Activity tolerance: Good    Discharge recommendations: home with home health     Barriers to discharge: Inaccessible home environment    Equipment recommendations: commode, tub bench     GOALS:   Multidisciplinary Problems     Physical Therapy Goals        Problem: Physical Therapy Goal    Goal Priority Disciplines Outcome Goal Variances Interventions   Physical Therapy Goal     PT, PT/OT Ongoing (interventions implemented as appropriate)     Description:  Goals to be met by: 19     Patient will increase functional independence with mobility by performin. Supine to sit with Stand-by Assistance  2. Sit to supine with Modified [place]  3. Sit to stand transfer with Supervision  4. Bed to chair transfer with Supervision w/o AD  5. Gait  x 150 feet with Supervision w/o AD  6. Ascend/descend 4 stair with left Handrail for balance only and w/ Stand-by Assistance  7. Ascend/Descend 4 inch curb step with Stand-by Assistance w/o AD  8. Stand for 3 minutes with Stand-by Assistance w/o AD while performing activity                      PLAN:    Patient to be seen 5 x/week  to address the above listed problems via gait training, therapeutic activities, therapeutic exercises  Plan of Care Expires: 10/10/19    Carmel Rodríguez, PT 9/10/2019

## 2019-09-10 NOTE — PLAN OF CARE
Problem: Fall Injury Risk  Goal: Absence of Fall and Fall-Related Injury    Intervention: Identify and Manage Contributors to Fall Injury Risk     09/10/19 4062   Manage Acute Allergic Reaction   Medication Review/Management medications reviewed

## 2019-09-10 NOTE — PT/OT/SLP EVAL
Occupational Therapy  Evaluation/Treatment    Kaylee Romero   MRN: [id number]   Admitting Diagnosis: E Coli Sepsis, s/p CABG; SIRS     OT Date of Treatment: 09/10/19     Billable Minutes:  Evaluation 20  Self Care/Home Management 47    Diagnosis: E Coli Sepsis, s/p CABG; SIRS     Past Medical History:   Diagnosis Date    Acid reflux     Age-related osteoporosis without current pathological fracture 2019    Cataract     CKD (chronic kidney disease) stage 3, GFR 30-59 ml/min     Dry mouth     History of TIA (transient ischemic attack) 2018    Hyperlipidemia 2014    Hypertensive heart disease with diastolic heart failure 2012    Morbid obesity with body mass index (BMI) of 40.0 or higher 2016    KAMRAN (obstructive sleep apnea)     KAMRAN on CPAP 2016    Osteoporosis without current pathological fracture 2018    Physical deconditioning 2018    Status post total left knee replacement 2017    Type 2 diabetes mellitus with stage 3 chronic kidney disease, without long-term current use of insulin 2019      Past Surgical History:   Procedure Laterality Date    ankle surgery (l)      APPENDECTOMY       SECTION      CORONARY ARTERY BYPASS GRAFT (CABG)X3 N/A 2019    [facility]    DILATION AND CURETTAGE OF UTERUS      KNEE ARTHROSCOPY W/ DEBRIDEMENT      KNEE SURGERY Left 2017    TKR    Left heart cath Left 2019    [facility] CATH LAB    REPLACEMENT-KNEE-TOTAL Left 2017    [facility]    SURGICAL PROCUREMENT, VEIN, ENDOSCOPIC Left 2019    Per[facility] by [facility]         General Precautions: Standard, sternal(cardiac fall, standard)  Orthopedic Precautions: N/A  Braces: N/A        Patient History:       Prior level of function: IND with all ADLs and IADLs      Dominant  "hand: right    Subjective:  Communicated with nurse prior to session.  Pt reported " I'm in a lot of pain."  Chief Complaint: Pain in L shoulder.  Patient/Family stated goals: Pain management, increased independence with ADLs and functional mobility, and increased activity tolerance    Pain/Comfort  Pain Rating 1: 9/10  Location - Side 1: Left  Location 1: shoulder  Pain Addressed 1: Pre-medicate for activity, Distraction  Pain Rating Post-Intervention 1: (not taken)    Objective:   Patient found with: (alone with no lines sitting EOB on cell phone)    Cognitive Exam:  Oriented to: Person, Place, Time and Situation; remembered 3/3 words (sock, blue, and bed)   Follows Commands/attention: Follows multistep  commands  Communication: clear/fluent  Memory:  No Deficits noted  Safety awareness/insight to disability: intact  Coping skills/emotional control: Appropriate to situation  Pt reports she had a concussion, which causes her to need some time to catch her thoughts occasionally.     Visual/perceptual:  Intact    Physical Exam:  Postural examination/scapula alignment:    -       No postural abnormalities identified  Skin integrity: Visible skin intact  Edema: None noted in B UE    Sensation:      -       Intact - pt reports having experienced numbness in L UE, causing her to drop things     Upper Extremity Range of Motion:  Right Upper Extremity: WFL but shld flexion and abduction not tested past 90 degs.  Left Upper Extremity: WFL  but shld flexion and abduction not tested past 90 degs.  Upper Extremity Strength:  Right Upper Extremity: WFL  Left Upper Extremity: WFL - pt reports having experienced weakness in L UE proximally to distally.   Strength: WFL (R) hand but fluctuates in (L) hand with Pt reporting that she sometimes has difficulty holding on to things.    Fine motor coordination:      -   Grossly Intact    Gross motor coordination: WFL     Occupational Performance:    Bed Mobility:    · Patient " completed Rolling/Turning to Right with stand by assistance  · Patient completed Supine to Sit with stand by assistance     Functional Mobility/Transfers:  · Patient completed Sit <> Stand Transfer with supervision  with  no assistive device   · Patient completed Bed <> Chair Transfer using regular technique with supervision with no assistive device  · Patient completed Toilet Transfer regular technique with supervision with  no AD  · Patient completed  Shower Transfer regular technique with supervision with no AD  · Functional Mobility: Pt performed all transfers and AMB around her room with close SBA with no AD.  Pt's gait was        Minimally unsteady initially but progressively improved.    Activities of Daily Living:  · Feeding:  modified independence with food tray  · Grooming: supervision with setup  · Upper Body Dressing: supervision with setup  · Lower Body Dressing: stand by assistance with setup of reacher to don/doff pants and sock aid to doff/don socks  · Toileting: supervision with setup      Additional Treatment:  Educated pt on role of OT safety when performing functional transfers and standing and POC.    Patient left supine with call button in reach     [facility] 6 Click:  [facility] Total Score: 22    Assessment:  Kaylee Romero is a 74 y.o. female with a medical diagnosis of E Coli Sepsis, s/p CABG; SIRS.  She is a retired .  Pt lives in a [facility] with 4 [facility] that has rails on the L side.  Pt lives with her  and their adult son.  Pt has a rolling walker and a cane at home, which she reports she has never used.  Pt uses a CPAP for her sleep apnea, has a standard tub with a shower chair, and has a standard toilet. Pt reports she was IND with all ADLs and IADLs prior to her heart attack.  Pt performed all ADLs with supervision except LBD with SBA with setup of the reacher to don/doff pants and the sock aid to doff/don socks.  Pt reports she has a history of falls, having 3 or 4 in  the past year due to missteps, rolling chairs at home, falling OOB, and falling getting off the toilet.  Pt presents with performance deficits of physical skills including impaired functional mobility, strength, functional endurance, and functional use of (L) UE. Pt is motivated and would benefit from OT intervention to further her functional (I)ce and safety.         Rehab identified problem list/impairments: weakness, impaired endurance, gait instability, impaired cardiopulmonary response to activity, pain, L UE function     Rehab potential is good    Activity tolerance: Good    Discharge recommendations:   Home with HHOT    Barriers to discharge: None     Equipment recommendations:   TBD    GOALS:   Multidisciplinary Problems     Occupational Therapy Goals        Problem: Occupational Therapy Goal    Goal Priority Disciplines Outcome Interventions   Occupational Therapy Goal     OT, PT/OT Ongoing (interventions implemented as appropriate)    Description:  Goals to be met by: 9/17/19     Patient will increase functional independence with ADLs by performing:    Pt to perform the following by d/c::    Bed mobility with Mod I.  Toileting with Mod I.  Bathing with Mod I.  UBD with Mod I.  LBD with Mod I.  Grooming with Mod I.  B UE exercise program x 15 mins.  Functional standing activity x 10 mins.                         PLAN: Patient to be seen 5 x/week to address the above listed problems via self-care/home management, therapeutic activities, therapeutic exercises  Plan of Care expires: 10/10/19  Plan of Care reviewed with: patient    SHE Nash/FARRUKH  09/10/2019

## 2019-09-10 NOTE — LETTER
September 11, 2019      Peterson Ventura MD  [address]  [facility] [id number]           [facility]  [address]  [facility] [address]  Phone: [phone]          Patient: Kaylee Romero   MR Number: [id number]   YOB: 1945   Date of Visit: 9/10/2019       Dear Dr. Peterson Ventura:    Thank you for referring Kaylee Romero to me for evaluation. Attached you will find relevant portions of my assessment and plan of care.    If you have questions, please do not hesitate to call me. I look forward to following Kaylee Romero along with you.    Sincerely,    Abraham Hernandez PA-C    Enclosure  CC:  No Recipients    If you would like to receive this communication electronically, please contact [email or web address] or [phone] to request more information on [employer] access.    For providers and/or their staff who would like to refer a patient to [facility], please contact us through our one-stop-shop provider referral line, Krishna Mccarthy, at [phone].    If you feel you have received this communication in error or would no longer like to receive these types of communications, please e-mail [email or web address]

## 2019-09-10 NOTE — PLAN OF CARE
Problem: Physical Therapy Goal  Goal: Physical Therapy Goal  Goals to be met by: 19     Patient will increase functional independence with mobility by performin. Supine to sit with Stand-by Assistance  2. Sit to supine with Modified [place]  3. Sit to stand transfer with Supervision  4. Bed to chair transfer with Supervision w/o AD  5. Gait  x 150 feet with Supervision w/o AD  6. Ascend/descend 4 stair with left Handrail for balance only and w/ Stand-by Assistance  7. Ascend/Descend 4 inch curb step with Stand-by Assistance w/o AD  8. Stand for 3 minutes with Stand-by Assistance w/o AD while performing activity    Outcome: Ongoing (interventions implemented as appropriate)  PT eval completed. Pt will begin PT POC.    Carmel Rodríguez, PT  9/10/2019

## 2019-09-11 ENCOUNTER — TELEPHONE (OUTPATIENT)
Dept: CARDIOTHORACIC SURGERY | Facility: CLINIC | Age: 74
End: 2019-09-11

## 2019-09-11 PROBLEM — R78.81 E COLI BACTEREMIA: Status: ACTIVE | Noted: 2019-09-11

## 2019-09-11 PROBLEM — N39.0 E. COLI UTI: Status: ACTIVE | Noted: 2019-09-11

## 2019-09-11 PROBLEM — B96.20 E. COLI UTI: Status: ACTIVE | Noted: 2019-09-11

## 2019-09-11 PROBLEM — I12.9 TYPE 2 DIABETES MELLITUS WITH STAGE 3 CHRONIC KIDNEY DISEASE AND HYPERTENSION: Status: ACTIVE | Noted: 2019-05-16

## 2019-09-11 PROBLEM — A41.51 E. COLI SEPSIS: Status: ACTIVE | Noted: 2019-09-11

## 2019-09-11 PROBLEM — B96.20 E COLI BACTEREMIA: Status: ACTIVE | Noted: 2019-09-11

## 2019-09-11 LAB
ANION GAP SERPL CALC-SCNC: 10 MMOL/L (ref 8–16)
BUN SERPL-MCNC: 21 MG/DL (ref 8–23)
CALCIUM SERPL-MCNC: 9.7 MG/DL (ref 8.7–10.5)
CHLORIDE SERPL-SCNC: 102 MMOL/L (ref 95–110)
CO2 SERPL-SCNC: 23 MMOL/L (ref 23–29)
CREAT SERPL-MCNC: 1.5 MG/DL (ref 0.5–1.4)
EST. GFR  (AFRICAN AMERICAN): 39.8 ML/MIN/1.73 M^2
EST. GFR  (NON AFRICAN AMERICAN): 34.6 ML/MIN/1.73 M^2
GLUCOSE SERPL-MCNC: 166 MG/DL (ref 70–110)
POCT GLUCOSE: 142 MG/DL (ref 70–110)
POCT GLUCOSE: 148 MG/DL (ref 70–110)
POCT GLUCOSE: 159 MG/DL (ref 70–110)
POCT GLUCOSE: 198 MG/DL (ref 70–110)
POTASSIUM SERPL-SCNC: 4.8 MMOL/L (ref 3.5–5.1)
SODIUM SERPL-SCNC: 135 MMOL/L (ref 136–145)
TROPONIN I SERPL DL<=0.01 NG/ML-MCNC: 0.05 NG/ML (ref 0–0.03)

## 2019-09-11 PROCEDURE — 97110 THERAPEUTIC EXERCISES: CPT

## 2019-09-11 PROCEDURE — 84484 ASSAY OF TROPONIN QUANT: CPT

## 2019-09-11 PROCEDURE — 97535 SELF CARE MNGMENT TRAINING: CPT

## 2019-09-11 PROCEDURE — 97530 THERAPEUTIC ACTIVITIES: CPT

## 2019-09-11 PROCEDURE — 80048 BASIC METABOLIC PNL TOTAL CA: CPT

## 2019-09-11 PROCEDURE — 36415 COLL VENOUS BLD VENIPUNCTURE: CPT

## 2019-09-11 PROCEDURE — 63600175 PHARM REV CODE 636 W HCPCS: Performed by: INTERNAL MEDICINE

## 2019-09-11 PROCEDURE — 97802 MEDICAL NUTRITION INDIV IN: CPT

## 2019-09-11 PROCEDURE — 11000004 HC SNF PRIVATE

## 2019-09-11 PROCEDURE — 25000003 PHARM REV CODE 250: Performed by: NURSE PRACTITIONER

## 2019-09-11 PROCEDURE — 97116 GAIT TRAINING THERAPY: CPT

## 2019-09-11 PROCEDURE — 25000003 PHARM REV CODE 250: Performed by: PHYSICIAN ASSISTANT

## 2019-09-11 PROCEDURE — 25000003 PHARM REV CODE 250: Performed by: INTERNAL MEDICINE

## 2019-09-11 RX ORDER — LANOLIN ALCOHOL/MO/W.PET/CERES
400 CREAM (GRAM) TOPICAL 3 TIMES DAILY
Status: DISCONTINUED | OUTPATIENT
Start: 2019-09-11 | End: 2019-09-11

## 2019-09-11 RX ORDER — AMOXICILLIN 250 MG
1 CAPSULE ORAL 2 TIMES DAILY PRN
Status: DISCONTINUED | OUTPATIENT
Start: 2019-09-11 | End: 2019-09-16 | Stop reason: HOSPADM

## 2019-09-11 RX ORDER — DICLOFENAC SODIUM 10 MG/G
2 GEL TOPICAL 2 TIMES DAILY
Status: DISCONTINUED | OUTPATIENT
Start: 2019-09-11 | End: 2019-09-12

## 2019-09-11 RX ORDER — ENOXAPARIN SODIUM 100 MG/ML
40 INJECTION SUBCUTANEOUS EVERY 24 HOURS
Status: DISCONTINUED | OUTPATIENT
Start: 2019-09-11 | End: 2019-09-16 | Stop reason: HOSPADM

## 2019-09-11 RX ADMIN — OXYCODONE HYDROCHLORIDE 5 MG: 5 TABLET ORAL at 12:09

## 2019-09-11 RX ADMIN — POTASSIUM CHLORIDE 20 MEQ: 1500 TABLET, EXTENDED RELEASE ORAL at 08:09

## 2019-09-11 RX ADMIN — METOPROLOL SUCCINATE 100 MG: 50 TABLET, EXTENDED RELEASE ORAL at 08:09

## 2019-09-11 RX ADMIN — LOSARTAN POTASSIUM 100 MG: 50 TABLET, FILM COATED ORAL at 08:09

## 2019-09-11 RX ADMIN — ENOXAPARIN SODIUM 40 MG: 100 INJECTION SUBCUTANEOUS at 04:09

## 2019-09-11 RX ADMIN — Medication 400 MG: at 03:09

## 2019-09-11 RX ADMIN — AMLODIPINE BESYLATE 10 MG: 10 TABLET ORAL at 08:09

## 2019-09-11 RX ADMIN — GLIPIZIDE 20 MG: 5 TABLET ORAL at 08:09

## 2019-09-11 RX ADMIN — OXYCODONE HYDROCHLORIDE 5 MG: 5 TABLET ORAL at 08:09

## 2019-09-11 RX ADMIN — ATORVASTATIN CALCIUM 80 MG: 20 TABLET, FILM COATED ORAL at 08:09

## 2019-09-11 RX ADMIN — OXYCODONE HYDROCHLORIDE 5 MG: 5 TABLET ORAL at 11:09

## 2019-09-11 RX ADMIN — ASPIRIN 325 MG: 325 TABLET, COATED ORAL at 08:09

## 2019-09-11 RX ADMIN — OXYCODONE HYDROCHLORIDE 5 MG: 5 TABLET ORAL at 05:09

## 2019-09-11 RX ADMIN — Medication 800 MG: at 08:09

## 2019-09-11 RX ADMIN — FAMOTIDINE 20 MG: 20 TABLET, FILM COATED ORAL at 08:09

## 2019-09-11 RX ADMIN — DICLOFENAC 2 G: 10 GEL TOPICAL at 09:09

## 2019-09-11 NOTE — PT/OT/SLP PROGRESS
Occupational Therapy  Treatment    Kaylee Romero   MRN: [id number]   Admitting Diagnosis: E. coli sepsis    OT Date of Treatment: 09/11/19       Billable Minutes:  Self Care/Home Management 17, Therapeutic Activity 22 and Therapeutic Exercise 10    General Precautions: Standard, fall, sternal  Orthopedic Precautions: N/A  Braces: N/A      Subjective:  Communicated with nurse prior to session.  Pt was pleasant and cooperative during session but complained of pain in L shoulder.    Pain/Comfort  Pain Rating 1: 7/10  Location - Side 1: Left  Location - Orientation 1: generalized  Location 1: shoulder  Pain Addressed 1: Pre-medicate for activity, Reposition, Distraction  Pain Rating Post-Intervention 1: 10/10    Objective:  Patient found with: (no lines with nurse sitting in bedside chair); nurse and pt reported pt had higher blood sugar than normal    Occupational Performance:    Bed Mobility:    · Patient completed Supine to Sit with stand by assistance     Functional Mobility/Transfers:  · Patient completed Sit <> Stand Transfer with stand by assistance  with  no assistive device   · Patient completed Bedside Chair <> Wheelchair Transfer using Stand Pivot technique with stand by assistance with no assistive device  · Patient completed Chair <> Mat Stand Pivot technique with stand by assistance with no assistive device  · Functional Mobility: Pt performed all transfers with stand by assistance with no assistive device.     Activities of Daily Living:  · Lower Body Dressing: stand by assistance with setup of reacher to don and doff pants and sock aid to don and doff socks    [facility] 6 Click:  [facility] Total Score: 22    OT Exercises: AROM and strengthening of R UE using 1 lb dolley for shoulder flexion, elbow extension and flexion, and forearm pronation/supination x 2 reps of 10 each; did not perform with L UE due to pain     Additional Treatment:  Pt performed functional standing activity x 7 minutes before needing a 1  minute break.  Pt able to perform activity for 1 extra minute.    Patient left up in wheelchair with call button in reach    ASSESSMENT:  Kaylee Romero is a 74 y.o. female with a medical diagnosis of E. coli sepsis.  Pt tolerated session well but complained of pain in her L shoulder throughout session.  Pt performed simulated bed mobility activity on mat and all transfers with SBA without AD.  Pt performed LBD of donning/doffing pants and donning/doffing socks with SBA with setup of reacher and sock aid.  Pt performed functional standing activity x 7 minutes before needing a 1 minute rest break due to decreased standing endurance.  Pt could only continue to perform activity for an additional minute. Pt performed R UE dolley exercise in all planes x 2 reps of 10 each.  Pt could not perform these exercises with L UE due to pain in L shoulder.  Pt would continue to benefit from OT services to increase her IND with ADLs and functional mobility and to increase her standing endurance.     Rehab identified problem list/impairments: weakness, impaired endurance, gait instability, impaired cardiopulmonary response to activity, pain    Rehab potential is good    Activity tolerance: Good    Discharge recommendations:   Home with HH    Barriers to discharge: None     Equipment recommendations:   TBD    GOALS:   Multidisciplinary Problems     Occupational Therapy Goals        Problem: Occupational Therapy Goal    Goal Priority Disciplines Outcome Interventions   Occupational Therapy Goal     OT, PT/OT Ongoing (interventions implemented as appropriate)    Description:  Goals to be met by: 9/17/19     Patient will increase functional independence with ADLs by performing:    Pt to perform the following by d/c::    Bed mobility with Mod I.  Toileting with Mod I.  Bathing with Mod I.  UBD with Mod I.  LBD with Mod I.  Grooming with Mod I.  B UE exercise program x 15 mins.  Functional standing activity x 10 mins.                          Plan:  Patient to be seen 5 x/week to address the above listed problems via self-care/home management, therapeutic activities, therapeutic exercises  Plan of Care expires: 10/10/19  Plan of Care reviewed with: patient    SHE Nash/FARRUKH  09/11/2019

## 2019-09-11 NOTE — PLAN OF CARE
Problem: Physical Therapy Goal  Goal: Physical Therapy Goal  Goals to be met by: 19     Patient will increase functional independence with mobility by performin. Supine to sit with Stand-by Assistance  2. Sit to supine with Modified [place]  3. Sit to stand transfer with Supervision  4. Bed to chair transfer with Supervision w/o AD  5. Gait  x 150 feet with Supervision w/o AD  6. Ascend/descend 4 stair with left Handrail for balance only and w/ Stand-by Assistance  7. Ascend/Descend 4 inch curb step with Stand-by Assistance w/o AD  8. Stand for 3 minutes with Stand-by Assistance w/o AD while performing activity     Outcome: Ongoing (interventions implemented as appropriate)  LTGs remain appropriate. Pt will continue PT POC.    Carmel Rodríguez, PT  2019

## 2019-09-11 NOTE — PROGRESS NOTES
[facility]                                  Skilled Nursing Facility                   Progress Note     Admit Date: 9/9/2019  ISIDRA TBD   Principal Problem:  E. coli sepsis   HPI obtained from patient interview and chart review     Chief Complaint: Establish Care/ Initial Visit     HPI:   Mrs. Romero is 74 year old female with PMHx of CAD s/p CABG on 8/12/29, HTN, DMT2, CKD III, obesity, & KAMRAN presents to SNF following a hospitalization for E coli bacteremia.  Admission to SNF secondary to continued weakness and debility.     [place] [facility] [place]/p mechanical fall due to weakness, fever, tachycardic, tachypneic, and hypotension secondary to LLE cellulitis infection and DOREEN. Blood cultures from 8/28 found to be positive for E. Coli bacteremia.  She was started on Zosyn IV.     Today patient continues to complains of left shoulder pain that she states is chronic but has worsened from her prior fall.  Pain starts from posterior shoulder and radiates down to her left upper chest. She went to Ortho clinic on 9/10 to request a corticosteroid injection; however, Ortho PA and MD feel that due to her recent surgery and sepsis the injection should be deferred at time. Current pain medications provide minimal relief. She denies associated numbness and tingling, but she has decreased ROM.    Patient will be treated at [facility] with PT and OT to improve functional status and ability to perform ADLs.     Past Medical History: Patient has a past medical history of Acid reflux, Age-related osteoporosis without current pathological fracture (1/11/2019), Cataract, CKD (chronic kidney disease) stage 3, GFR 30-59 ml/min, Dry mouth, History of TIA (transient ischemic attack) (12/11/2018), Hyperlipidemia (12/2/2014), Hypertensive heart disease with diastolic heart failure (11/28/2012), Morbid obesity with body mass index (BMI) of 40.0  or higher (2016), KAMRAN (obstructive sleep apnea), KAMRAN on CPAP (2016), Osteoporosis without current pathological fracture (2018), Physical deconditioning (2018), Status post total left knee replacement 2017 (2017), and Type 2 diabetes mellitus with stage 3 chronic kidney disease, without long-term current use of insulin (2019).    Past Surgical History: Patient has a past surgical history that includes  section; ankle surgery (l); Appendectomy; Knee arthroscopy w/ debridement; Dilation and curettage of uterus; Knee surgery (Left, 2017); Left heart catheterization (Left, 2019); Coronary Artery Bypass Graft (CABG) (N/A, 2019); and Endoscopic harvest of vein (Left, 2019).    Social History: Patient reports that she has never smoked. She has never used smokeless tobacco. She reports that she drinks alcohol. She reports that she does not use drugs.    Family History: family history includes Diabetes in her father; Heart disease in her father and mother; Heart failure in her father and mother; No Known Problems in her brother and son; Stroke in her father and paternal grandfather.    Allergies: Patient is allergic to bactrim [sulfamethoxazole-trimethoprim] and keflex [cephalexin].    ROS  Constitutional: Negative for fever or fatigue.   Eyes: Negative for blurred vision, double vision and discharge.   Respiratory: Negative for shortness of breath and wheezing.  + intermittent dry cough  Cardiovascular: Negative for chest pain, palpitations, and leg swelling.   Gastrointestinal: Negative for abdominal pain, constipation, diarrhea, nausea and vomiting.   Genitourinary: Negative for dysuria, frequency and urgency.   Musculoskeletal:  + generalized weakness, left shoulder pain. Negative for back pain and myalgias.   Skin: Negative for itching and rash.   Neurological: Negative for dizziness, speech change, and headaches.   Psychiatric/Behavioral: Negative for  depression. The patient is not nervous/anxious.      PEx  Temp:  [97.3 °F (36.3 °C)-98.8 °F (37.1 °C)]   Pulse:  [72-87]   Resp:  [18]   BP: (142-157)/(64-81)   SpO2:  [96 %-100 %]      Constitutional: Patient appears well-developed and in no distress   HENT:   Head: Normocephalic and atraumatic.   Eyes: Pupils are equal, round, and reactive to light.   Neck: Normal range of motion. Neck supple.   Cardiovascular: Normal rate, regular rhythm and normal heart sounds.    Pulmonary/Chest: Effort normal and breath sounds are clear  Abdominal: Soft. Bowel sounds are normal.   Musculoskeletal:  Decreased range of motion to left upper extremity  Neurological: Alert and oriented to person, place, and time.   Psychiatric: Normal mood and affect. Behavior is normal.   Skin: Skin is warm and dry. Full skin assessment to be completed with next visit due to PT apt.  Midsternal incision visualized- surgical glue peeling away, incision fully approximated and without signs of infection.  Sternum stable.      Assessment and Plan:    Sepsis due to E coli UTI and bacteremia  - Completed course of IV Zosyn   - blood cultures positive on 08/28; repeat cultures on 08/29 NGTD     CAD  S/p CABG 8/12 with Dr. Ventura   - maintain sternal precautions  - Continue  mg daily, atorvastatin 80 mg daily, metoprolol  mg daily, losartan 100 mg daily  - Previously on furosemide 40 mg BID at [facility] but not continued here   - daily weights  - Follow up with cardiac surgery as scheduled     Essential HTN   - continue metoprolol, losartan, amlodipine 10 mg daily  - 9/1124 hour blood pressure range is 142/64 to 157/81     Debility   - Continue with PT/OT for gait training and strengthening and restoration of ADL's   - Encourage mobility, OOB in chair, and early ambulation as appropriate  - Fall precautions   - Monitor for bowel and bladder dysfunction  - Monitor for and prevent skin breakdown and pressure ulcers  - Continue DVT prophylaxis with   Lovenox 40 mg daily    DM II  - last HgbA1c 7.5 on 8/7/2019  - home regimen: Levemir 8 units q HS, Glipizide 20 mg daily, Trulicity 1.5 mg SQ once weekly.   - Continue glipizide 20 mg daily and SSI   - continue accuchecks   - Holding trulicity and levemir for now  - 9/11 24 hour blood glucose range is     CKD III with DOREEN  - Baseline sCr around 1.3  - avoid nephrotoxin agents and renally dose medications  - Cr with rise to 1.7 from 1.3 on 9/9  - 9/11 Cr decreased to 1.5; will continue to trend twice weekly    Acute blood loss anemia  - improving; H&H currently 9.0/30 from 7.6/24     Morbid obesity   - Progressive exercise therapy     Hypomagnesium, resolved  - 9/11 discontinued magnesium oxide TID     Left  Shoulder pain  - Follow up with orthopedics completed on 09/10  - Ortho would like to defer cortisone injection at this time due to recent surgery and sepsis.  - 9/11 initiated Voltaren gel BID; discontinue naproxen due to mild DOREEN. Asked PT to apply kinesiology tape.     Future Appointments   Date Time Provider Department Center   9/17/2019  1:30 PM Pop Henderson MD [facility] CARDIO [facility]   10/2/2019 11:00 AM EKG, APPT [facility] EKG [facility]   10/2/2019 11:15 AM [facility] [id number] LB LIMIT [facility] XRAY OP [facility]   10/2/2019 12:00 PM Peterson Ventura MD [facility] CARDVAS [facility]   10/4/2019  1:30 PM Liz Roberts MD [facility]     I certify that SNF services are required to be given on an inpatient basis because Kaylee Romero needs for skilled nursing care and/or skilled rehabilitation are required on a daily basis and such services can only practically be provided in a skilled nursing facility setting and are for an ongoing condition for which she received inpatient care in the hospital.     Total time of the visit 68 minutes [id number]  Non physical exam/ non charting time: 46 minutes   Description of non physical exam/non charting time: counseling patient on clinical conditions and  therapies provided regarding postop CABG care, maintaining sternal precautions, left shoulder pain and medication therapies, and the beginning of discharge planning.  Extensive chart review completed including all consultation notes.  All pertinent laboratory and radiographical images reviewed.        Shara Atkins NP      DOS: 9/11/2019       Patient note was created using MModal Dictation.  Any errors in syntax or even information may not have been identified and edited on initial review prior to signing this note.

## 2019-09-11 NOTE — PLAN OF CARE
Problem: Adult Inpatient Plan of Care  Goal: Plan of Care Review  Outcome: Ongoing (interventions implemented as appropriate)     09/11/19 0047   Plan of Care Review   Plan of Care Reviewed With patient       Problem: Fall Injury Risk  Goal: Absence of Fall and Fall-Related Injury  Outcome: Ongoing (interventions implemented as appropriate)  Intervention: Identify and Manage Contributors to Fall Injury Risk     09/11/19 0047   Manage Acute Allergic Reaction   Medication Review/Management medications reviewed   Identify and Manage Contributors to Fall Injury Risk   Self-Care Promotion BADL personal routines maintained;BADL personal objects within reach;safe use of adaptive equipment encouraged     Intervention: Promote Injury-Free Environment     09/11/19 0047   Optimize [place] and Functional Mobility   Environmental Safety Modification assistive device/personal items within reach;clutter free environment maintained;mobility aid in reach;lighting adjusted;room organization consistent   Optimize Balance and Safe Activity   Safety Promotion/Fall Prevention assistive device/personal item within reach;Fall Risk reviewed with patient/family;Fall Risk signage in place;lighting adjusted;medications reviewed;nonskid shoes/socks when out of bed;upper side rails raised x 2, lower siderails raised x 1 (Peds only);instructed to call staff for mobility

## 2019-09-11 NOTE — TELEPHONE ENCOUNTER
Called the pt and left message for her to get what she needed from the rehab that she is in right now , they should be able to get it for her.

## 2019-09-11 NOTE — PLAN OF CARE
"Problem: Adult Inpatient Plan of Care  Goal: Plan of Care Review  Outcome: Ongoing (interventions implemented as appropriate)    Inadequate protein and energy intake related to lack of appetite post op , taste changes significant as evidenced by PO intake < 50% for one month post op CABG, with patient stating "I could afford to lose some weight"  In the presence of increased needs for healing and improved immunity  New      Recommendation/Intervention: Recommend cardiac 2000 diabetic diet, boost glucose BID, nutrition education per pt request, RD following  Goals: PO to meet 85% of EEN/EPN by next RD visit  Nutrition Goal Status: new  Communication of RD Recs: reviewed with physician      "

## 2019-09-11 NOTE — PHYSICIAN QUERY
"PT Name: Kaylee Romero  MR #: [id number]    Physician Query Form - Heart  Condition Clarification     CDS/: Ade Mcneil RN, CCDS             Contact information: [email or web address]  This form is a permanent document in the medical record.     Query Date: September 11, 2019    By submitting this query, we are merely seeking further clarification of documentation. Please utilize your independent clinical judgment when addressing the question(s) below.    The medical record contains the following   Indicators     Supporting Clinical Findings Location in Medical Record    BNP     X EF Ef 75% 8/28 tte   X Radiology findings . No source of sepsis identified as clinically questioned.  Mild cardiomegaly.  Postoperative changes of a median sternotomy for CABG.   Bibasilar subsegmental atelectasis 8/28 ct chest   X Echo Results · Increased (hyperdynamic) left ventricular systolic function. The estimated ejection fraction is 75%  · Concentric left ventricular remodeling.  · No wall motion abnormalities.  · Normal LV diastolic function.  · Normal right ventricular systolic function.  · Normal central venous pressure (3 mm Hg).  · The estimated PA systolic pressure is 17 mm Hg  · Technically poor study 8/28 tte    "Ascites" documented     X "SOB" or "GRANADO" documented tachypneic    X "Hypoxia" documented She presents to the ER febrile, tachycardic, tachypneic, and hypoxic, RA sats 80's   8/28 ed note   X Heart Failure documented Hypertensive heart disease with diastolic heart failure 8/28 ed note, 9/4 cards note, 9/5 consult    "Edema" documented     X Diuretics/Meds Furosemide 20 mg IV 2 x a day    Furosemide 40 mg IV 2 x a day    Furosemide 40 mg PO 2 x a day Mar- Start: 08/31/19 1000 End: 09/05/19 0715  Mar-09/05/19 0900 End: 09/05/19 1256  Mar-Start: 09/05/19 1800 End: 09/09/19 1855   X Treatment: Pt still requiring supplemental O2 to stay above 90%    S/p 2L bolus    .  Sats > 98% on 3 L NC 8/28 ed note      8/28 " cc prog note    8/28 nurse poc   X Other:  patient had a CABG 2 weeks ago  8/28 ed note   Heart failure (HF) can be acute, chronic or both. It is generally further specificed as systolic, diastolic, or combined. Lastly, it is important to identify an underlying etiology if known or suspected.     Common clues to acute exacerbation:  Rapidly progressive symptoms (w/in 2 weeks of presentation), using IV diuretics to treat, using supplemental O2, pulmonary edema on Xray, MI w/in 4 weeks, and/or BNP >500    Systolic Heart Failure: is defined as chart documentation of a left ventricular ejection fraction (LVEF) less than 40%     Diastolic Heart Failure: is defined as a left ventricular ejection fraction (LVEF) greater than 40%   +      Evidence of diastolic dysfunction on echocardiography OR    Right heart catheterization wedge pressure above 12 mm Hg OR    Left heart catheterization left ventricular end diastolic pressure 18 mm Hg or above.    References: *American Heart Association    The clinical guidelines noted below are only system guidelines, and do not replace the providers clinical judgment.     Provider, please specify the acuity of diastolic HF.  [   ] Acute on Chronic Diastolic Heart Failure -    Pre-existing diastoic HF diagnosis.  EF > 40%  and acute HF symptoms documented       [ x  ] Chronic Diastolic Heart Failure - Pre-existing diastolic HF diagnosis.  EF > 40%  without  acute HF symptoms documented   [   ] Other Type of Heart Failure (please specify type):   [   ] Heart Failure Ruled Out   [   ] Other (please specify):   [  ] Clinically Undetermined                           Please document in your progress notes daily for the duration of treatment until resolved and include in your discharge summary.

## 2019-09-11 NOTE — H&P
Hospital Medicine  History and Physical Exam    Admit Date: 9/9/2019  ISIDRA TBD  Principal Problem:  <principal problem not specified>   Primary care Physician: Liz Roberts MD  Code status: Full Code    HPI:   74-year-old female with recent CABG 8/12 came to the ER on 8/27 for evaluation generalized malaise, fatigue, weakness, decreased appetite, multiple falls. Prior to presentation, patient had a mechanical slip and fall secondary to not feeling well and feeling weak, she fell and hit her face, there was no LOC. Patient was briefly in the ICU for sepsis due to E. Coli UTI and bacteremia. She has completed a course of antibiotcs. Today patient complains of left shoulder pain. Pain starts from posterior shoulder and radiates down to her left upper chest. She had called her orthopedist and will see him this afternoon for a corticosteroid injection. She has had chronic bilateral shoulder pain that has been intermittent and has progressively worsening in the last few days. Current pain medications provide minimal relief. She denies associated numbness and tingling, but she has decreased ROM.    Patient transferred to [facility] with PT and OT to improve functional status and ability to perform ADLs.     Past Medical History: Patient has a past medical history of Acid reflux, Age-related osteoporosis without current pathological fracture (1/11/2019), Cataract, CKD (chronic kidney disease) stage 3, GFR 30-59 ml/min, Dry mouth, History of TIA (transient ischemic attack) (12/11/2018), Hyperlipidemia (12/2/2014), Hypertensive heart disease with diastolic heart failure (11/28/2012), Morbid obesity with body mass index (BMI) of 40.0 or higher (5/9/2016), KAMRAN (obstructive sleep apnea), KAMRAN on CPAP (6/28/2016), Osteoporosis without current pathological fracture (11/11/2018), Physical deconditioning (11/5/2018), Status post total left knee replacement 5/1/2017 (5/16/2017), and Type 2 diabetes mellitus with stage 3  chronic kidney disease, without long-term current use of insulin (2019).    Past Surgical History: Patient has a past surgical history that includes  section; ankle surgery (l); Appendectomy; Knee arthroscopy w/ debridement; Dilation and curettage of uterus; Knee surgery (Left, 2017); Left heart catheterization (Left, 2019); Coronary Artery Bypass Graft (CABG) (N/A, 2019); and Endoscopic harvest of vein (Left, 2019).    Social History: Patient reports that she has never smoked. She has never used smokeless tobacco. She reports that she drinks alcohol. She reports that she does not use drugs.    Family History: family history includes Diabetes in her father; Heart disease in her father and mother; Heart failure in her father and mother; No Known Problems in her brother and son; Stroke in her father and paternal grandfather.    Medications: reviewed     Allergies: Patient is allergic to bactrim [sulfamethoxazole-trimethoprim] and keflex [cephalexin].    ROS  Constitutional: no fever or chills  Respiratory: no cough or shortness of breath  Cardiovascular: no chest pain or palpitations  Gastrointestinal: no nausea or vomiting, no abdominal pain or change in bowel habits  Genitourinary: no hematuria or dysuria  Integument/Breast: no rash or pruritis  Hematologic/Lymphatic: no easy bruising or lymphadenopathy  Musculoskeletal: see HPI  Neurological: no seizures or tremors  Behavioral/Psych: no depression or anxiety    PEx  Temp:  [97.3 °F (36.3 °C)-98.8 °F (37.1 °C)]   Pulse:  [72-87]   Resp:  [18]   BP: (142-157)/(64-94)   SpO2:  [96 %-100 %]   Body mass index is 36.21 kg/m².     General appearance: no distress  Mental status: Alert and oriented x 3  HEENT:  conjunctivae/corneas clear, PERRL  Neck: supple, thyroid not enlarged  Pulm:   normal respiratory effort, CTA B, no c/w/r  Card: RRR, no murmur  Abd: soft, NT, ND, BS present; no masses, no organomegaly  Ext: no edema  Pulses: 2+,  symmetric  Skin: color, texture, turgor normal. No rashes or lesions  Neuro: CN II-XII grossly intact, no focal numbness or weakness, normal strength and tone     Recent Labs   Lab 09/07/19 0454 09/08/19 0418 09/09/19  0355 09/11/19  0507    136 137 135*   K 4.5 4.6 5.2* 4.8    101 102 102   CO2 25 24 22* 23   BUN 14 16 19 21   CREATININE 1.3 1.3 1.7* 1.5*   * 159* 137* 166*   CALCIUM 9.5 9.3 9.7 9.7   MG 1.7 1.9 2.1  --    PHOS 3.6 4.0 4.1  --      Recent Labs   Lab 09/07/19 0454 09/08/19 0418 09/09/19  0355   WBC 6.14 6.40 6.72   HGB 7.8* 7.6* 9.0*   HCT 25.9* 24.4* 30.6*    294 329   GRAN 55.3  3.4 62.3  4.0 59.9  4.0   LYMPH 29.3  1.8 23.0  1.5 27.4  1.8   MONO 8.0  0.5 8.0  0.5 6.5  0.4       Recent Labs   Lab 09/10/19  1135 09/10/19  1740 09/10/19  1951 09/10/19  2159 09/11/19  0131 09/11/19  0656   POCTGLUCOSE 154* 76 93 94 148* 159*       Assessment and Plan:    Sepsis due to E coli UTI and bacteremia  Completed course of anitbiotics    CAD  S/p CABG 8/12  Continue  mg daily, atorvastatin 80 mg daily, metoprolol  mg daily, losartan 100 mg daily  Previously on furosemide 40 mg BID but not continued here - daily weights  Follow up with cardiac surgery as scheduled    Essential HTN - continue metoprolol, losartan, amlodipine 10 mg daily    Debility  PT/OT    DM II with HTN and CKD III  HgbA1c 7.5 8/7/2019  Baseline up to SCr 1.3  Continue glipizide 20 mg daily, lantus 8 units subq qhs  accuchecks and SSI  Hold trulicity for now     Acute blood loss anemia  improving    Morbid obesity with body mass index (BMI) of 40.0 or higher  Progressive exercise therapy    Hypokalemia, resolved  Hypomagnesium, resolved  Stop KCl  Decrease magnesium to 400 mg TID    Left  Shoulder pain  Follow up with orthopedics as scheduled  Consider multimodal therapy if intervention this afternoon does not help    Continue the following medications for treatment of the indicated  conditions:  ·  Indication Medication Dose Route  Frequency       HTN amLODIPine  10 mg Oral Daily    MI prevention aspirin  325 mg Oral Daily    CAD atorvastatin  80 mg Oral Daily    DVT prophylaxis enoxaparin  40 mg Subcutaneous Daily    GI  famotidine  20 mg Oral Daily    DM II glipiZIDE  20 mg Oral Daily with breakfast    HTN losartan  100 mg Oral Daily    hypomagnesium magnesium oxide  800 mg Oral TID    CAD metoprolol succinate  100 mg Oral Daily    BM regimen senna-docusate 8.6-50 mg  1 tablet Oral BID       Future Appointments   Date Time Provider Department Center   9/17/2019  1:30 PM Pop Henderson MD [facility] CARDIO [facility]   10/2/2019 11:00 AM EKG, APPT [facility] EKG [facility]   10/2/2019 11:15 AM [facility] [id number] 485 LB LIMIT [facility] XRAY OP [facility]   10/2/2019 12:00 PM Peterson Ventura MD [facility] CARDVAS [facility]   10/4/2019  1:30 PM Liz Roberts MD [facility]           I certify that SNF services are required to be given on an inpatient basis because Kaylee Romero needs for skilled nursing care and/or skilled rehabilitation are required on a daily basis and such services can only practically be provided in a skilled nursing facility setting and are for an ongoing condition for which she received inpatient care in the hospital.     Time (minutes) spent in care of the patient (Greater than 1/2 spent in direct face-to-face contact) 40    Mariaelena Calderon MD

## 2019-09-11 NOTE — PLAN OF CARE
Problem: Adult Inpatient Plan of Care  Goal: Plan of Care Review     09/11/19 1533   Plan of Care Review   Plan of Care Reviewed With patient       Problem: Diabetes Comorbidity  Goal: Blood Glucose Level Within Desired Range  Outcome: Ongoing (interventions implemented as appropriate)  Intervention: Maintain Glycemic Control     09/11/19 1533   Monitor and Manage Ketoacidosis   Glycemic Management blood glucose monitoring         Problem: Fall Injury Risk  Goal: Absence of Fall and Fall-Related Injury  Outcome: Ongoing (interventions implemented as appropriate)  Intervention: Identify and Manage Contributors to Fall Injury Risk     09/11/19 1533   Manage Acute Allergic Reaction   Medication Review/Management medications reviewed   Identify and Manage Contributors to Fall Injury Risk   Self-Care Promotion independence encouraged         Comments: Patient monitored every 1 to 2 hours for pain and safety.  Safety maintained.  Blood glucose monitored  before meals and bedtime.  Patient instructed to call for assistance.Call  Light and persoanl items in reach.

## 2019-09-11 NOTE — PT/OT/SLP PROGRESS
Physical Therapy  Treatment    Kaylee Romero   MRN: [id number]   Admitting Diagnosis: E. coli sepsis    PT Received On: 09/11/19        Billable Minutes:  Gait Training 15, Therapeutic Activity 11, Therapeutic Exercise 22 and Total Time 48    Treatment Type: Treatment  PT/PTA: PT     PTA Visit Number: 0       General Precautions: Standard, fall, sternal  Orthopedic Precautions: N/A   Braces: N/A    Spiritual, Cultural Beliefs, Sabianist Practices, Values that Affect Care: no    Subjective:  Communicated with patient prior to session.  Pt agreeable to session.    Pain/Comfort  Pain Rating 1: 8/10  Location - Side 1: Left  Location - Orientation 1: generalized  Location 1: shoulder  Pain Addressed 1: Pre-medicate for activity, Reposition, Distraction  Pain Rating Post-Intervention 1: 8/10    Objective:  Patient found sitting in w/c.       AM-PAC 6 CLICK MOBILITY  Total Score:18    Bed Mobility:  Sit>Supine: on mat and bed w/ SBA  Supine>Sit: on mat w/ [place] for trunk  Pt maintains sternal precautions    Transfers:  Sit<>Stand: to/from w/c (3 trials) w/o AD w/ SBA  Stand Pivot Transfer: w/c<>nustep, w/c<>EOM, and w/c>EOB; all w/o AD w/ SBA  Pt maintains sternal precautions    Gait:  Amb 2 trials (100ft and 115ft) w/o AD w/ SBA for safety  Lateral sway noted causing mild instability     Therex:  Supine and seated BLE therex 2x10 reps (GS, SAQ, AP, HF, LAQ)    Balance:  Standing ring tree activity x90s w/o AD w/ CGA/SBA for safety    Additional Treatment:  Recumbent cross , Level 1, x15min w/ BLE only to inc strength/endurance    Patient left supine with call button in reach.    Assessment:  Kaylee Romero is a 74 y.o. female with a medical diagnosis of E. coli sepsis.  Pt herminia session well w/ good participation. She remains at a CGA/SBA level for mobility. Pt will continue PT POC.    Rehab identified problem list/impairments: impaired endurance, impaired sensation, impaired self care skills, impaired  functional mobilty, gait instability, impaired balance, decreased upper extremity function, decreased lower extremity function, pain, edema, impaired cardiopulmonary response to activity, orthopedic precautions    Rehab potential is good.    Activity tolerance: Good    Discharge recommendations: home with home health     Barriers to discharge: Inaccessible home environment    Equipment recommendations: commode, tub bench     GOALS:   Multidisciplinary Problems     Physical Therapy Goals        Problem: Physical Therapy Goal    Goal Priority Disciplines Outcome Goal Variances Interventions   Physical Therapy Goal     PT, PT/OT Ongoing (interventions implemented as appropriate)     Description:  Goals to be met by: 19     Patient will increase functional independence with mobility by performin. Supine to sit with Stand-by Assistance  2. Sit to supine with Modified [employer]  3. Sit to stand transfer with Supervision  4. Bed to chair transfer with Supervision w/o AD  5. Gait  x 150 feet with Supervision w/o AD  6. Ascend/descend 4 stair with left Handrail for balance only and w/ Stand-by Assistance  7. Ascend/Descend 4 inch curb step with Stand-by Assistance w/o AD  8. Stand for 3 minutes with Stand-by Assistance w/o AD while performing activity                      PLAN:    Patient to be seen 5 x/week  to address the above listed problems via gait training, therapeutic activities, therapeutic exercises  Plan of Care expires: 10/10/19  Plan of Care reviewed with: patient    Carmel VELMA Rodríguez, PT  2019

## 2019-09-11 NOTE — CONSULTS
"  [facility] - Skilled Nursing Care  Adult Nutrition  Consult Note    SUMMARY     Recommendations    Recommendation/Intervention: Recommend cardiac 2000 diabetic diet, boost glucose BID, nutrition education per pt request, RD following  Goals: PO to meet 85% of EEN/EPN by next RD visit  Nutrition Goal Status: new  Communication of RD Recs: reviewed with physician    Reason for Assessment    Diagnosis: (SIRS, Debility)  Relevant Medical History: DM2, CKD 3, HLD, UTI, GERD, HF, CAD, osteoporosis, anemia, KAMRAN, CABG 19,   Interdisciplinary Rounds: did not attend  General Information Comments: Pt reports poor appetite since surgery, only picked up in last two days,       patient dismissed weight loss assuming she needed to lose fat anyway. Pt educated on increased needs and potential for undesirble muscle loss with poor intake,                                                          Nutrition Discharge Planning: DC on cardiac diabetic diet,     Nutrition Risk Screen    Nutrition Risk Screen: no indicators present    Nutrition/Diet History    Typical Food/Fluid Intake: one meal per day despite, diabetes out pt clinic education to eat more frequently, pt just retired may be able to eat more often, she says she may snack but cannot think about a meal  Food Preferences: no milk  Spiritual, Cultural Beliefs, Sikhism Practices, Values that Affect Care: no  Food Allergies: NKFA  Factors Affecting Nutritional Intake: altered taste, decreased appetite    Anthropometrics    Temp: 97.3 °F (36.3 °C)  Height: 5' 5" (165.1 cm)  Height (inches): 65 in  Weight Method: Standard Scale  Weight: 98.4 kg (216 lb 14.9 oz)  Weight (lb): 216.93 lb  Ideal Body Weight (IBW), Female: 125 lb  % Ideal Body Weight, Female (lb): 173.54 lb  BMI (Calculated): 36.2  BMI Grade: 35 - 39.9 - obesity - grade II  Weight Loss: unintentional  Usual Body Weight (UBW), k.2 kg  % Usual Body Weight: 96.48  % Weight Change From Usual Weight: -3.72 %   " "    Lab/Procedures/Meds    Pertinent Labs Reviewed: reviewed  Pertinent Labs Comments: Na 135, Cr 1.5, glucose 159, HgA1c 7.5,   Pertinent Medications Reviewed: reviewed  Pertinent Medications Comments: ASA, statin, glipizide, Mg, lovenox             Estimated/Assessed Needs    Weight Used For Calorie Calculations: 98.4 kg (216 lb 14.9 oz)  Energy Calorie Requirements (kcal): 1867  Energy Need Method: [place]-St Jeor  Protein Requirements: 85g  Weight Used For Protein Calculations: 56.8 kg (125 lb 3.5 oz)(DBW kg x 1.5g)  Fluid Requirements (mL): per MD 2000 Fluid restriction(pt notifed of restriciton)     RDA Method (mL): 1867  CHO Requirement: 50% of calories      Nutrition Prescription Ordered    Current Diet Order: Regular, fluid restriciton of 2000 ml  Nutrition Order Comments: PO picked up to 100%, pt asked for and was given printed menu  Oral Nutrition Supplement: boost glucose TID chocolate    Evaluation of Received Nutrient/Fluid Intake    Energy Calories Required: meeting needs  Protein Required: meeting needs  Fluid Required: meeting needs  % Intake of Estimated Energy Needs: 75 - 100 %  % Meal Intake: 75 - 100 %    Nutrition Risk    Level of Risk/Frequency of Follow-up: high     Assessment and Plan  Inadequate protein and energy intake related to lack of appetite post op , taste changes significant as evidenced by PO intake < 50% for one month post op CABG, with patient stating "I could afford to lose some weight"  In the presence of increased needs for healing and improved immunity  New    Monitor and Evaluation    Food and Nutrient Intake: food and beverage intake  Food and Nutrient Adminstration: diet order  Knowledge/Beliefs/Attitudes: food and nutrition knowledge/skill  Physical Activity and Function: nutrition-related ADLs and IADLs  Anthropometric Measurements: weight change  Biochemical Data, Medical Tests and Procedures: glucose/endocrine profile, inflammatory profile, electrolyte and renal " panel, gastrointestinal profile  Nutrition-Focused Physical Findings: overall appearance     Malnutrition Assessment 9/11/19     Skin (Micronutrient): wounds unhealed, edema  Eyes (Micronutrient): conjunctiva dull  Extraoral (Micronutrient): (chapped lips)  Musculoskeletal/Lower Extremities: muscle wasting, edema       Weight Loss (Malnutrition): (4% loss in one month post op)  Energy Intake (Malnutrition): less than or equal to 50% for greater than or equal to 1 month   Orbital Region (Subcutaneous Fat Loss): well nourished  Upper Arm Region (Subcutaneous Fat Loss): well nourished  Thoracic and Lumbar Region: well nourished   [place] Region (Muscle Loss): well nourished  Clavicle Bone Region (Muscle Loss): well nourished  Clavicle and Acromion Bone Region (Muscle Loss): well nourished  Scapular Bone Region (Muscle Loss): well nourished  Dorsal Hand (Muscle Loss): mild depletion  Posterior Calf Region (Muscle Loss): mild depletion   Edema (Fluid Accumulation): 1-->trace             Nutrition Follow-Up    RD Follow-up?: Yes

## 2019-09-11 NOTE — PROGRESS NOTES
Pt's cbg 93, attempted to follow orders and give glucose tabs but pt's refused. Pt then ate a snack. Rechecked cbg approx an hour later and cbg is now 94. Notified Dr. walters of pt's cbg situation. md ordered to hold insulin and spot check pt during the night. Pt has no s/s of distress. Will continue to monitor pt.

## 2019-09-11 NOTE — PROGRESS NOTES
SUBJECTIVE:     Chief Complaint & History of Present Illness:  Kaylee Romero is a  Established  patient 74 y.o. female who is seen here today with a complaint of  left shoulder pain .  She is a patient well-known to us was last seen treated the clinic for her shoulder 02/26/2019 by Elvia Whitehead at which time she had undergone a therapeutic diagnostic cortisone injection of the left shoulder.  She responded very well to the injection and was doing well with her shoulder.  Unfortunate since our last visit she suffered a MI has undergone a subsequent CABG 08/12/2019 followed by septic infection and SIRS, she is currently admitted sniff.  She arise at the clinic today via transport from skilled nursing requesting a cortisone injection in her left shoulder.  She reports increasing pain in the shoulder over the past several weeks.  She also rule reports a fall details were difficult to ascertain  On a scale of 1-10, with 10 being worst pain imaginable, he rates this pain as 5 on good days and 10 on bad days.  she describes the pain as tender and sore.    Review of patient's allergies indicates:   Allergen Reactions    Bactrim [sulfamethoxazole-trimethoprim] Other (See Comments)     Generic version  Sulfa makes her sick    Keflex [cephalexin] Other (See Comments)     Turns orange         No current facility-administered medications for this visit.      No current outpatient medications on file.     Facility-Administered Medications Ordered in Other Visits   Medication Dose Route Frequency Provider Last Rate Last Dose    acetaminophen tablet 1,000 mg  1,000 mg Oral Q6H PRN Catherine Baker PA-C   1,000 mg at 09/10/19 2055    acetaminophen tablet 650 mg  650 mg Oral Q6H PRN Catherine Baker PA-C        amLODIPine tablet 10 mg  10 mg Oral Daily Catherine Baker PA-C   10 mg at 09/11/19 0821    aspirin EC tablet 325 mg  325 mg Oral Daily Catherine Baker PA-C   325 mg at 09/11/19 0822     atorvastatin tablet 80 mg  80 mg Oral Daily Catherine Baker PA-C   80 mg at 09/11/19 0822    calcium carbonate 200 mg calcium (500 mg) chewable tablet 500 mg  500 mg Oral BID PRN Catherine Baker PA-C        famotidine tablet 20 mg  20 mg Oral Daily Catherine Baker PA-C   20 mg at 09/11/19 0822    glipiZIDE tablet 20 mg  20 mg Oral Daily with breakfast Catherine Baker PA-C   20 mg at 09/11/19 0831    glucagon (human recombinant) injection 1 mg  1 mg Intramuscular PRN Catherine Baker PA-C        glucose chewable tablet 16 g  16 g Oral PRN Catherine Baker PA-C        glucose chewable tablet 24 g  24 g Oral PRN Catherine Baker PA-C        hydrALAZINE tablet 25 mg  25 mg Oral Q8H PRN Chung Wells PA-C        insulin detemir U-100 pen 8 Units  8 Units Subcutaneous QHS Catherine Baker PA-C   8 Units at 09/09/19 2156    losartan tablet 100 mg  100 mg Oral Daily Catherine Baker PA-C   100 mg at 09/11/19 0821    magnesium oxide tablet 800 mg  800 mg Oral TID Catherine Baker PA-C   800 mg at 09/11/19 0822    metoprolol succinate (TOPROL-XL) 24 hr tablet 100 mg  100 mg Oral Daily Catherine Baker PA-C   100 mg at 09/11/19 0822    naproxen tablet 500 mg  500 mg Oral Q12H PRN Catherine Baker PA-C        ondansetron disintegrating tablet 4 mg  4 mg Oral Q8H PRN Catherine Baker PA-C        oxyCODONE immediate release tablet 5 mg  5 mg Oral Q4H PRN Catherine Baker PA-C   5 mg at 09/11/19 0823    potassium chloride SA CR tablet 20 mEq  20 mEq Oral BID Catherine Baker PA-C   20 mEq at 09/11/19 0821    senna-docusate 8.6-50 mg per tablet 1 tablet  1 tablet Oral BID Catherine Baker PA-C           Past Medical History:   Diagnosis Date    Acid reflux     Age-related osteoporosis without current pathological fracture 1/11/2019    Cataract     CKD (chronic kidney disease) stage 3, GFR 30-59 ml/min     Dry mouth     History  of TIA (transient ischemic attack) 2018    Hyperlipidemia 2014    Hypertensive heart disease with diastolic heart failure 2012    Morbid obesity with body mass index (BMI) of 40.0 or higher 2016    KAMRAN (obstructive sleep apnea)     KAMRAN on CPAP 2016    Osteoporosis without current pathological fracture 2018    Physical deconditioning 2018    Status post total left knee replacement 2017    Type 2 diabetes mellitus with stage 3 chronic kidney disease, without long-term current use of insulin 2019       Past Surgical History:   Procedure Laterality Date    ankle surgery (l)      APPENDECTOMY       SECTION      CORONARY ARTERY BYPASS GRAFT (CABG)X3 N/A 2019    [facility]    DILATION AND CURETTAGE OF UTERUS      KNEE ARTHROSCOPY W/ DEBRIDEMENT      KNEE SURGERY Left 2017    TKR    Left heart cath Left 2019    Performed by Ronak Gibbons MD at [facility] CATH LAB    REPLACEMENT-KNEE-TOTAL Left 2017    Performed by [facility] Jr[facility] at [facility]    SURGICAL PROCUREMENT, VEIN, ENDOSCOPIC Left 2019    Performed by [facility]       Vital Signs (Most Recent)  Vitals:    09/10/19 1614   BP: (!) 148/94   Pulse: 75       Review of Systems:  ROS:  Constitutional: no fever or chills  Eyes: no visual changes  ENT: no nasal congestion or sore throat  Respiratory: no cough or shortness of breath  Cardiovascular: Positive forNSTEMI, coronary artery disease status post CABG x2  Gastrointestinal: no nausea or vomiting, tolerating diet, Positive for GERD  Genitourinary: no hematuria or dysuria, Positive CKD stage 3  Integument/Breast: no rash or pruritis  Hematologic/Lymphatic: no easy bruising or lymphadenopathy, Positive status post sepsis, SIRS  Musculoskeletal: no arthralgias or myalgias, Status post left TKA, shoulder impingement syndrome,  osteoporosis  Neurological: no seizures or tremors, Positive for TIA  Behavioral/Psych: no auditory or visual hallucinations  Endocrine: no heat or cold intolerance, Positive chronic vitamin-D deficiency, diabetes type 2      OBJECTIVE:     PHYSICAL EXAM:    Weight: (no wt. pt. is in a wheel chair severe lt. shoulder pain), General Appearance: Well nourished, well developed, in no acute distress.  Neurological: Mood & affect are normal.  Shoulder exam: left  Tenderness: globally  ROM: forward flexion 90 / 90, extension 70 / 70, full abduction 100 / 100  Shoulder Strength: not tested  positive for tenderness about the glenohumeral joint, positive for tenderness over the acromioclavicular joint and positive for impingement sign  Stability tests: anterior apprehension test negative and posterior apprehension test negative  Special Tests:Cross-chest abduction: diffuse pain                     RADIOGRAPHS:  X-rays taken today films reviewed by me demonstrate no evidence of fracture dislocation or about the shoulder mild to moderate degenerative joint disease throughout with the glenohumeral and acromioclavicular joint space narrowing and osteophytic spurring    ASSESSMENT/PLAN:       ICD-10-CM ICD-9-CM   1. Chronic left shoulder pain M25.512 719.41    G89.29 338.29       Plan: We discussed with the patient at length all the different treatment options available for her leftshoulder including anti-inflammatories, acetaminophen, rest, ice, Physical therapy to include strengthening exercise, occasional cortisone injections for temporary relief, arthroscopic surgical repair, and finally shoulder arthroplasty.   In light of her surgical medical history I have concerns proceeding with an cortisone injection at this time will contact her cardiologist for further consultation.  I will contact the patient with results of these conversations

## 2019-09-12 LAB
ANION GAP SERPL CALC-SCNC: 7 MMOL/L (ref 8–16)
BASOPHILS # BLD AUTO: 0.04 K/UL (ref 0–0.2)
BASOPHILS NFR BLD: 0.7 % (ref 0–1.9)
BUN SERPL-MCNC: 20 MG/DL (ref 8–23)
CALCIUM SERPL-MCNC: 8.9 MG/DL (ref 8.7–10.5)
CHLORIDE SERPL-SCNC: 102 MMOL/L (ref 95–110)
CO2 SERPL-SCNC: 23 MMOL/L (ref 23–29)
CREAT SERPL-MCNC: 1.3 MG/DL (ref 0.5–1.4)
DIFFERENTIAL METHOD: ABNORMAL
EOSINOPHIL # BLD AUTO: 0.2 K/UL (ref 0–0.5)
EOSINOPHIL NFR BLD: 3.9 % (ref 0–8)
ERYTHROCYTE [DISTWIDTH] IN BLOOD BY AUTOMATED COUNT: 18.5 % (ref 11.5–14.5)
EST. GFR  (AFRICAN AMERICAN): 47.4 ML/MIN/1.73 M^2
EST. GFR  (NON AFRICAN AMERICAN): 41.1 ML/MIN/1.73 M^2
GLUCOSE SERPL-MCNC: 159 MG/DL (ref 70–110)
HCT VFR BLD AUTO: 28.5 % (ref 37–48.5)
HGB BLD-MCNC: 8.6 G/DL (ref 12–16)
IMM GRANULOCYTES # BLD AUTO: 0.04 K/UL (ref 0–0.04)
IMM GRANULOCYTES NFR BLD AUTO: 0.7 % (ref 0–0.5)
LYMPHOCYTES # BLD AUTO: 1.3 K/UL (ref 1–4.8)
LYMPHOCYTES NFR BLD: 24.3 % (ref 18–48)
MAGNESIUM SERPL-MCNC: 2.2 MG/DL (ref 1.6–2.6)
MCH RBC QN AUTO: 27.9 PG (ref 27–31)
MCHC RBC AUTO-ENTMCNC: 30.2 G/DL (ref 32–36)
MCV RBC AUTO: 93 FL (ref 82–98)
MONOCYTES # BLD AUTO: 0.6 K/UL (ref 0.3–1)
MONOCYTES NFR BLD: 10.1 % (ref 4–15)
NEUTROPHILS # BLD AUTO: 3.3 K/UL (ref 1.8–7.7)
NEUTROPHILS NFR BLD: 60.3 % (ref 38–73)
NRBC BLD-RTO: 0 /100 WBC
PHOSPHATE SERPL-MCNC: 3.8 MG/DL (ref 2.7–4.5)
PLATELET # BLD AUTO: 324 K/UL (ref 150–350)
PMV BLD AUTO: 10 FL (ref 9.2–12.9)
POCT GLUCOSE: 128 MG/DL (ref 70–110)
POCT GLUCOSE: 134 MG/DL (ref 70–110)
POCT GLUCOSE: 177 MG/DL (ref 70–110)
POCT GLUCOSE: 204 MG/DL (ref 70–110)
POCT GLUCOSE: 215 MG/DL (ref 70–110)
POTASSIUM SERPL-SCNC: 4.8 MMOL/L (ref 3.5–5.1)
RBC # BLD AUTO: 3.08 M/UL (ref 4–5.4)
SODIUM SERPL-SCNC: 132 MMOL/L (ref 136–145)
WBC # BLD AUTO: 5.43 K/UL (ref 3.9–12.7)

## 2019-09-12 PROCEDURE — 63600175 PHARM REV CODE 636 W HCPCS: Performed by: NURSE PRACTITIONER

## 2019-09-12 PROCEDURE — 97530 THERAPEUTIC ACTIVITIES: CPT

## 2019-09-12 PROCEDURE — 97116 GAIT TRAINING THERAPY: CPT

## 2019-09-12 PROCEDURE — 80048 BASIC METABOLIC PNL TOTAL CA: CPT

## 2019-09-12 PROCEDURE — 85025 COMPLETE CBC W/AUTO DIFF WBC: CPT

## 2019-09-12 PROCEDURE — 36415 COLL VENOUS BLD VENIPUNCTURE: CPT

## 2019-09-12 PROCEDURE — 11000004 HC SNF PRIVATE

## 2019-09-12 PROCEDURE — 84100 ASSAY OF PHOSPHORUS: CPT

## 2019-09-12 PROCEDURE — 25000003 PHARM REV CODE 250: Performed by: PHYSICIAN ASSISTANT

## 2019-09-12 PROCEDURE — 97110 THERAPEUTIC EXERCISES: CPT

## 2019-09-12 PROCEDURE — 25000003 PHARM REV CODE 250: Performed by: NURSE PRACTITIONER

## 2019-09-12 PROCEDURE — 63600175 PHARM REV CODE 636 W HCPCS: Performed by: INTERNAL MEDICINE

## 2019-09-12 PROCEDURE — 83735 ASSAY OF MAGNESIUM: CPT

## 2019-09-12 RX ORDER — GABAPENTIN 100 MG/1
100 CAPSULE ORAL 3 TIMES DAILY
Status: DISCONTINUED | OUTPATIENT
Start: 2019-09-12 | End: 2019-09-16 | Stop reason: HOSPADM

## 2019-09-12 RX ORDER — DICLOFENAC SODIUM 10 MG/G
2 GEL TOPICAL 3 TIMES DAILY
Status: DISCONTINUED | OUTPATIENT
Start: 2019-09-12 | End: 2019-09-16 | Stop reason: HOSPADM

## 2019-09-12 RX ORDER — IBUPROFEN 200 MG
16 TABLET ORAL
Status: DISCONTINUED | OUTPATIENT
Start: 2019-09-12 | End: 2019-09-16 | Stop reason: HOSPADM

## 2019-09-12 RX ORDER — INSULIN ASPART 100 [IU]/ML
0-5 INJECTION, SOLUTION INTRAVENOUS; SUBCUTANEOUS
Status: DISCONTINUED | OUTPATIENT
Start: 2019-09-12 | End: 2019-09-16 | Stop reason: HOSPADM

## 2019-09-12 RX ORDER — GLUCAGON 1 MG
1 KIT INJECTION
Status: DISCONTINUED | OUTPATIENT
Start: 2019-09-12 | End: 2019-09-16 | Stop reason: HOSPADM

## 2019-09-12 RX ORDER — IBUPROFEN 200 MG
24 TABLET ORAL
Status: DISCONTINUED | OUTPATIENT
Start: 2019-09-12 | End: 2019-09-16 | Stop reason: HOSPADM

## 2019-09-12 RX ADMIN — ASPIRIN 325 MG: 325 TABLET, COATED ORAL at 09:09

## 2019-09-12 RX ADMIN — GLIPIZIDE 20 MG: 5 TABLET ORAL at 09:09

## 2019-09-12 RX ADMIN — INSULIN ASPART 1 UNITS: 100 INJECTION, SOLUTION INTRAVENOUS; SUBCUTANEOUS at 09:09

## 2019-09-12 RX ADMIN — DICLOFENAC SODIUM 2 G: 10 GEL TOPICAL at 02:09

## 2019-09-12 RX ADMIN — ENOXAPARIN SODIUM 40 MG: 100 INJECTION SUBCUTANEOUS at 04:09

## 2019-09-12 RX ADMIN — OXYCODONE HYDROCHLORIDE 5 MG: 5 TABLET ORAL at 12:09

## 2019-09-12 RX ADMIN — DICLOFENAC 2 G: 10 GEL TOPICAL at 09:09

## 2019-09-12 RX ADMIN — OXYCODONE HYDROCHLORIDE 5 MG: 5 TABLET ORAL at 06:09

## 2019-09-12 RX ADMIN — METOPROLOL SUCCINATE 100 MG: 50 TABLET, EXTENDED RELEASE ORAL at 09:09

## 2019-09-12 RX ADMIN — FAMOTIDINE 20 MG: 20 TABLET, FILM COATED ORAL at 09:09

## 2019-09-12 RX ADMIN — GABAPENTIN 100 MG: 100 CAPSULE ORAL at 09:09

## 2019-09-12 RX ADMIN — ATORVASTATIN CALCIUM 80 MG: 20 TABLET, FILM COATED ORAL at 09:09

## 2019-09-12 RX ADMIN — AMLODIPINE BESYLATE 10 MG: 10 TABLET ORAL at 09:09

## 2019-09-12 RX ADMIN — DICLOFENAC SODIUM 2 G: 10 GEL TOPICAL at 09:09

## 2019-09-12 RX ADMIN — GABAPENTIN 100 MG: 100 CAPSULE ORAL at 02:09

## 2019-09-12 RX ADMIN — LOSARTAN POTASSIUM 100 MG: 50 TABLET, FILM COATED ORAL at 09:09

## 2019-09-12 NOTE — PROGRESS NOTES
[facility]                                  Skilled Nursing Facility                   Progress Note     Admit Date: 9/9/2019  ISIDRA 9/16/2019  Principal Problem:  E. coli sepsis   HPI obtained from patient interview and chart review     Chief Complaint: Revaluation of medical treatment and therapy status: Lab review    HPI:   Mrs. Romero is 74 year old female with PMHx of CAD s/p CABG on 8/12/29, HTN, DMT2, CKD III, obesity, & KAMRAN presents to SNF following a hospitalization for E coli bacteremia.  Admission to SNF secondary to continued weakness and debility.     Interval history: All of today's labs reviewed and are listed below.  Patient still complaining of left shoulder pain- patient is very tearful.  I had to inform patient that ortho has decided against offering her a cortisone injection due to recent surgery and infection- patient was very disappointed and upset with this news.  Patient very argumentative with any new pain medication regimen I offer- she states she just needs the injection.  Patient denies trouble sleeping at night, shortness of breath or cough, abdominal discomfort, nausea, or vomiting.  Patient reports an adequate appetite.  Patient denies dysuria.  Patient reports having regular bowel movements.  Patient progessing with PT/OT- patient ambulated 100 ft and then 150 ft without assistive devices with standby assistance. Patient medically stable. Continuing to follow and treat all acute and chronic conditions.    Past Medical History: Patient has a past medical history of Acid reflux, Age-related osteoporosis without current pathological fracture (1/11/2019), Cataract, CKD (chronic kidney disease) stage 3, GFR 30-59 ml/min, Dry mouth, History of TIA (transient ischemic attack) (12/11/2018), Hyperlipidemia (12/2/2014), Hypertensive heart disease with diastolic heart failure (11/28/2012), Morbid obesity with body mass  index (BMI) of 40.0 or higher (2016), KAMRAN (obstructive sleep apnea), KAMRAN on CPAP (2016), Osteoporosis without current pathological fracture (2018), Physical deconditioning (2018), Status post total left knee replacement 2017 (2017), and Type 2 diabetes mellitus with stage 3 chronic kidney disease, without long-term current use of insulin (2019).    Past Surgical History: Patient has a past surgical history that includes  section; ankle surgery (l); Appendectomy; Knee arthroscopy w/ debridement; Dilation and curettage of uterus; Knee surgery (Left, 2017); Left heart catheterization (Left, 2019); Coronary Artery Bypass Graft (CABG) (N/A, 2019); and Endoscopic harvest of vein (Left, 2019).    Social History: Patient reports that she has never smoked. She has never used smokeless tobacco. She reports that she drinks alcohol. She reports that she does not use drugs.    Family History: family history includes Diabetes in her father; Heart disease in her father and mother; Heart failure in her father and mother; No Known Problems in her brother and son; Stroke in her father and paternal grandfather.    Allergies: Patient is allergic to bactrim [sulfamethoxazole-trimethoprim] and keflex [cephalexin].    ROS  Constitutional: Negative for fever or fatigue.   Eyes: Negative for blurred vision, double vision and discharge.   Respiratory: Negative for shortness of breath and wheezing.  + intermittent dry cough  Cardiovascular: Negative for chest pain, palpitations, and leg swelling.   Gastrointestinal: Negative for abdominal pain, constipation, diarrhea, nausea and vomiting.   Genitourinary: Negative for dysuria, frequency and urgency.   Musculoskeletal:  + generalized weakness, 10/10 left shoulder pain. Negative for back pain and myalgias.   Skin: Negative for itching and rash.   Neurological: Negative for dizziness, speech change, and headaches.    Psychiatric/Behavioral: Negative for depression. The patient is not nervous/anxious.      PEx  Temp:  [97.5 °F (36.4 °C)-97.9 °F (36.6 °C)]   Pulse:  [75-77]   Resp:  [16-18]   BP: (133-148)/(62-72)   SpO2:  [99 %]      Constitutional: Patient appears well-developed and in no distress   HENT:   Head: Normocephalic and atraumatic.   Eyes: Pupils are equal, round, and reactive to light.   Neck: Normal range of motion. Neck supple.   Cardiovascular: Normal rate, regular rhythm and normal heart sounds.    Pulmonary/Chest: Effort normal and breath sounds are clear  Abdominal: Soft. Bowel sounds are normal.   Musculoskeletal:  Decreased range of motion to left upper extremity  Neurological: Alert and oriented to person, place, and time.   Psychiatric: Normal mood and affect. Behavior is normal.   Skin: Skin is warm and dry. Full skin assessment completed.   Midsternal incision visualized- tension sutures present- surgical glue peeling away, incision fully approximated and without signs of infection.  Sternum stable. 3 old chest tube sites present- scabbed appearance, no drainage or signs of infection.  3 left leg graft sites- fully approximated, without drainage, hard tissue palpated.     Recent Labs   Lab 09/12/19  0554   *   K 4.8      CO2 23   BUN 20   CREATININE 1.3   MG 2.2     Recent Labs   Lab 09/12/19  0554   WBC 5.43   RBC 3.08*   HGB 8.6*   HCT 28.5*      MCV 93   MCH 27.9   MCHC 30.2*     Assessment and Plan:    Left  Shoulder pain  - Follow up with orthopedics completed on 09/10  - Ortho would like to defer cortisone injection at this time due to recent surgery and sepsis.  - 9/11 initiated Voltaren gel BID; discontinue naproxen due to mild DOREEN. Asked PT to apply kinesiology tape.   - 9/12 initiated gabapentin 100 mg TID; increase ferrous iron gel to TID.  Reached out to ortho to ask when they would be comfortable administering injection- PA states he will confirm with staff but he thinks  it will be a few weeks.    DM II  - last HgbA1c 7.5 on 8/7/2019  - home regimen: Levemir 8 units q HS, Glipizide 20 mg daily, Trulicity 1.5 mg SQ once weekly.   - Continue glipizide 20 mg daily and SSI   - continue accuchecks   - Holding trulicity and levemir for now  - 9/11 24 hour blood glucose range is   - 9/12 initiated Levemir 3 units qHS- will increase as appropriate-  24 hour blood glucose range is 128-204    CAD  S/p CABG 8/12 with Dr. Ventura   - maintain sternal precautions  - Continue  mg daily, atorvastatin 80 mg daily, metoprolol  mg daily, losartan 100 mg daily  - Previously on furosemide 40 mg BID at [facility] but not continued here   - daily weights  - Follow up with cardiac surgery as scheduled  - 9/12 which remained stable at 98 kg     Essential HTN   - continue metoprolol, losartan, amlodipine 10 mg daily  - 9/12 24 hour blood pressure range is 133/62 to 148/72    CKD III with DOREEN  - Baseline sCr around 1.3  - avoid nephrotoxin agents and renally dose medications  - Cr with rise to 1.7 from 1.3 on 9/9  - 9/11 Cr decreased to 1.5; will continue to trend twice weekly  - 9/12 improving Cr 1.3 today    Hyponatremia  - 9/12 sodium 132 today, asymptomatic, pace patient on a 2000 mL fluid restriction      CONTINUE       Debility   - Continue with PT/OT for gait training and strengthening and restoration of ADL's   - Encourage mobility, OOB in chair, and early ambulation as appropriate  - Fall precautions   - Monitor for bowel and bladder dysfunction  - Monitor for and prevent skin breakdown and pressure ulcers  - Continue DVT prophylaxis with  Lovenox 40 mg daily    Acute blood loss anemia  - improving; H&H currently 9.0/30 from 7.6/24     Morbid obesity   - Progressive exercise therapy     Hypomagnesium, resolved  - 9/11 discontinued magnesium oxide TID     Sepsis due to E coli UTI and bacteremia  - Completed course of IV Zosyn   - blood cultures positive on 08/28; repeat cultures on 08/29  NGTD  - resolved    Future Appointments   Date Time Provider Department Center   9/17/2019  1:30 PM Pop Henderson MD [facility] CARDIO [facility]   10/2/2019 11:00 AM EKG, APPT [facility] EKG [facility]   10/2/2019 11:15 AM [facility] [id number] LIMIT [facility] XRAY OP [facility]   10/2/2019 12:00 PM Peterson Ventrua MD [facility] CARDVAS [facility]   10/4/2019  1:30 PM Liz Roberts MD [facility]        Shara Atkins NP      DOS: 9/12/2019       Patient note was created using MModal Dictation.  Any errors in syntax or even information may not have been identified and edited on initial review prior to signing this note.

## 2019-09-12 NOTE — CLINICAL REVIEW
Clinical Pharmacy Chart Review Note      Admit Date: 9/9/2019   LOS: 3 days       Kaylee Romero is a 74 y.o. female admitted to SNF for PT/OT after hospitalization for E. Coli sepsis.    Active Hospital Problems    Diagnosis  POA    *E. coli sepsis [A41.51]  Yes    E coli bacteremia [R78.81]  Yes    E. coli UTI [N39.0, B96.20]  Yes    Acute blood loss anemia [D62]  Yes    S/P CABG (coronary artery bypass graft) [Z95.1]  Not Applicable    Coronary artery disease involving native coronary artery of native heart with angina pectoris [I25.119]  Yes    Type 2 diabetes mellitus with stage 3 chronic kidney disease and hypertension [E11.22, I12.9, N18.3]  Yes    Physical deconditioning [R53.81]  Yes    Morbid obesity with body mass index (BMI) of 40.0 or higher [E66.01]  Yes    Hyperlipidemia [E78.5]  Yes      Resolved Hospital Problems   No resolved problems to display.     Review of patient's allergies indicates:   Allergen Reactions    Bactrim [sulfamethoxazole-trimethoprim] Other (See Comments)     Generic version  Sulfa makes her sick    Keflex [cephalexin] Other (See Comments)     Turns orange     Patient Active Problem List    Diagnosis Date Noted    E. coli sepsis 09/11/2019    E coli bacteremia 09/11/2019    E. coli UTI 09/11/2019    Acute blood loss anemia 08/14/2019    CAD (coronary artery disease) 08/12/2019    S/P CABG (coronary artery bypass graft) 08/12/2019    Other heart failure     Coronary artery disease involving native coronary artery of native heart with angina pectoris 08/04/2019    Type 2 diabetes mellitus with stage 3 chronic kidney disease and hypertension 05/16/2019    Chronic left shoulder pain 04/24/2019    Age-related osteoporosis without current pathological fracture 01/11/2019    Vitamin D deficiency 01/11/2019    History of TIA (transient ischemic attack) 12/11/2018    Osteoporosis without current pathological fracture 11/11/2018    Physical deconditioning  11/05/2018    CKD (chronic kidney disease) stage 3, GFR 30-59 ml/min 04/20/2017    KAMRAN on CPAP 06/28/2016    Adrenal cortical steroids causing adverse effect in therapeutic use 05/09/2016    Morbid obesity with body mass index (BMI) of 40.0 or higher 05/09/2016    Hyperlipidemia 12/02/2014    Hypertension 11/28/2012    Osteoarthritis of knee 07/26/2012       Scheduled Meds:    amLODIPine  10 mg Oral Daily    aspirin  325 mg Oral Daily    atorvastatin  80 mg Oral Daily    diclofenac sodium  2 g Topical (Top) BID    enoxaparin  40 mg Subcutaneous Daily    famotidine  20 mg Oral Daily    glipiZIDE  20 mg Oral Daily with breakfast    losartan  100 mg Oral Daily    metoprolol succinate  100 mg Oral Daily     Continuous Infusions:   PRN Meds: acetaminophen, acetaminophen, calcium carbonate, glucagon (human recombinant), glucose, glucose, hydrALAZINE, ondansetron, oxyCODONE, senna-docusate 8.6-50 mg    OBJECTIVE:     Vital Signs (Last 24H)  Temp:  [97.5 °F (36.4 °C)-97.9 °F (36.6 °C)]   Pulse:  [75-77]   Resp:  [16-18]   BP: (133-148)/(62-72)   SpO2:  [99 %]     Laboratory:  CBC:   Recent Labs   Lab 09/08/19 0418 09/09/19 0355 09/12/19  0554   WBC 6.40 6.72 5.43   RBC 2.78* 3.31* 3.08*   HGB 7.6* 9.0* 8.6*   HCT 24.4* 30.6* 28.5*    329 324   MCV 88 92 93   MCH 27.3 27.2 27.9   MCHC 31.1* 29.4* 30.2*     BMP:   Recent Labs   Lab 09/08/19 0418 09/09/19 0355 09/11/19  0507 09/12/19  0554   * 137* 166* 159*    137 135* 132*   K 4.6 5.2* 4.8 4.8    102 102 102   CO2 24 22* 23 23   BUN 16 19 21 20   CREATININE 1.3 1.7* 1.5* 1.3   CALCIUM 9.3 9.7 9.7 8.9   MG 1.9 2.1  --  2.2     CMP:   Recent Labs   Lab 09/09/19  0355 09/11/19  0507 09/12/19  0554   * 166* 159*   CALCIUM 9.7 9.7 8.9    135* 132*   K 5.2* 4.8 4.8   CO2 22* 23 23    102 102   BUN 19 21 20   CREATININE 1.7* 1.5* 1.3     Lab Results   Component Value Date    HGBA1C 7.5 (H) 08/07/2019          ASSESSMENT/PLAN:     I have reviewed the medications in compliance with CMS Regulation F756 of the [place]. Based on information gathered, the following items may need to be addressed:    **Patient takes Levemir and Trulicity at home, currently these medications are not ordered.    Medications reviewed by PharmD, please re-consult if needed.          Berkley Wallace, Pharm. D.  Clinical Pharmacist  [facility]

## 2019-09-12 NOTE — PLAN OF CARE
"Problem: Adult Inpatient Plan of Care  Goal: Plan of Care Review  Outcome: Ongoing (interventions implemented as appropriate)  Mrs Romero was participating in recreational therapy, but patient requested to be brought back to her room d/t complaint of lt shoulder pain. Pt offered Oxycodone IR; she refused. She stated that she did not want to become addicted to the pain medication. Mrs Romero insisted that she wanted the extra strenght Tylenol eventhough her pain was rated at 9 on a pain scale of 0-10. Tylenol retreived from pyxis, pt ID bracelet scanned, and medication was opened to be administered to patient. Tylenol placed in patient's hand, and Mrs Romero stated, "I'll take the Oxycodone". Tylenol discarded. Mrs Romero received 5 mg of Oxycodone IR for c/o lt shoulder pain rated at 9 on a pain scale of 0-10. Pt wqs also seen at the bedside per NP, Shara Atkins in regards to pain. Safety measures maintained. Call light is within reach. Will continue with plan of care.       "

## 2019-09-12 NOTE — NURSING
When I was making rounds on paitents; Mrs Romero requested to have her PRN Oxycodone IR. When I returned to room to administer pain medication; pt stated that she was not having any pain.  She did not want to take the pain medication. Oxycodone IR returned to [facility].

## 2019-09-12 NOTE — PT/OT/SLP PROGRESS
"Physical Therapy  Treatment    Kaylee Romero   MRN: [id number]   Admitting Diagnosis: E. coli sepsis    PT Received On: 09/12/19        Billable Minutes:  Gait Training 15, Therapeutic Activity 15, Therapeutic Exercise 15 and Total Time 45    Treatment Type: Treatment  PT/PTA: PT     PTA Visit Number: 0       General Precautions: Standard, fall, sternal  Orthopedic Precautions: N/A   Braces: N/A    Spiritual, Cultural Beliefs, Scientologist Practices, Values that Affect Care: no    Subjective:  Communicated with patient prior to session.  Pt agreeable to session.    Pain/Comfort  Pain Rating 1: 0/10  Location - Side 1: Left  Location - Orientation 1: generalized  Location 1: shoulder  Pain Addressed 1: Pre-medicate for activity  Pain Rating Post-Intervention 1: 0/10    Objective:  Patient found sitting on commode in bathroom.       AM-PAC 6 CLICK MOBILITY  Total Score:18    Bed Mobility:  Not performed    Transfers:  Sit<>Stand: to/from w/c (multiple trials) w/o AD w/ SBA  Stand Pivot Transfer: w/c>bedside chair w/o AD w/ SBA  Pt maintains sternal precautions    Gait:  Amb 115ft (X2 trials) w/o AD w/ SBA for safety  Lateral sway noted causing mild instability but no LOB  Limited in distance by fatigue     Advanced Gait:  Curb Step: asc/andre 4" curb (x2 trials) w/o AD w/ CGA for safety  Cues/demo for technique/safety    Therex:  Seated BLE therex 2x10 reps (HF, LAQ, AP)    Balance:  Pt completed ADLs in room at start of session all in standing including toileting, dressing, and grooming (teeth and face). SPV for safety. ~10min total.    Additional Treatment:  Seated LBE x10min to inc overall endurance    Patient left up in chair with call button in reach.    Assessment:  Kaylee Romero is a 74 y.o. female with a medical diagnosis of E. coli sepsis.  Pt herminia session well w/ good participation. She is progressing well w/ mobility and will continue PT POC.    Rehab identified problem list/impairments: impaired endurance, " impaired sensation, impaired self care skills, impaired functional mobilty, gait instability, impaired balance, decreased upper extremity function, decreased lower extremity function, pain, edema, impaired cardiopulmonary response to activity, orthopedic precautions    Rehab potential is good.    Activity tolerance: Good    Discharge recommendations: home with home health     Barriers to discharge: Inaccessible home environment    Equipment recommendations: commode, tub bench     GOALS:   Multidisciplinary Problems     Physical Therapy Goals        Problem: Physical Therapy Goal    Goal Priority Disciplines Outcome Goal Variances Interventions   Physical Therapy Goal     PT, PT/OT Ongoing (interventions implemented as appropriate)     Description:  Goals to be met by: 19     Patient will increase functional independence with mobility by performin. Supine to sit with Stand-by Assistance  2. Sit to supine with Modified [place]  3. Sit to stand transfer with Supervision  4. Bed to chair transfer with Supervision w/o AD  5. Gait  x 150 feet with Supervision w/o AD  6. Ascend/descend 4 stair with left Handrail for balance only and w/ Stand-by Assistance  7. Ascend/Descend 4 inch curb step with Stand-by Assistance w/o AD  8. Stand for 3 minutes with Stand-by Assistance w/o AD while performing activity                      PLAN:    Patient to be seen 5 x/week  to address the above listed problems via gait training, therapeutic activities, therapeutic exercises  Plan of Care expires: 10/10/19  Plan of Care reviewed with: patient    Carmel VELMA Rodríguez, PT  2019

## 2019-09-12 NOTE — PLAN OF CARE
Problem: Physical Therapy Goal  Goal: Physical Therapy Goal  Goals to be met by: 19     Patient will increase functional independence with mobility by performin. Supine to sit with Stand-by Assistance  2. Sit to supine with Modified [place]  3. Sit to stand transfer with Supervision  4. Bed to chair transfer with Supervision w/o AD  5. Gait  x 150 feet with Supervision w/o AD  6. Ascend/descend 4 stair with left Handrail for balance only and w/ Stand-by Assistance  7. Ascend/Descend 4 inch curb step with Stand-by Assistance w/o AD  8. Stand for 3 minutes with Stand-by Assistance w/o AD while performing activity     Outcome: Ongoing (interventions implemented as appropriate)  LTGs remain appropriate. Pt will continue PT POC.    Carmel Rodríguez, PT  2019

## 2019-09-12 NOTE — PT/OT/SLP PROGRESS
Occupational Therapy  Treatment    Kaylee Romero   MRN: [id number]   Admitting Diagnosis: E. coli sepsis    OT Date of Treatment: 09/12/19       Billable Minutes:  Therapeutic Activity 48    General Precautions: Standard, fall, sternal  Orthopedic Precautions: N/A  Braces: N/A         Subjective:  Communicated with nsg  prior to session.  I am doing well but done wake this L arm    Pain/Comfort  Pain Rating 1: 0/10  Pain Rating Post-Intervention 1: 0/10    Objective:   Pt. Seated in w/c on arrival     Occupational Performance:    Bed Mobility:    · Patient completed Supine to Sit with contact guard assistance with out use of rails  · Patient completed Sit to Supine with stand by assistance      Functional Mobility/Transfers:  · Patient completed Sit <> Stand Transfer with stand by assistance  with  no assistive device   · Patient completed Bed <> Chair Transfer using Stand Pivot technique with stand by assistance with no assistive device      Activities of Daily Living:  · Upper Body Dressing: stand by assistance to doff/candace pull over gown and also to fold with in sternal limits  · Lower Body Dressing: stand by assistance to candace/doff pants seated and to manage over hips instance.    [facility] 6 Click:  [facility] Total Score: 22    Additional Treatment:  Pt. With standing act on this day with task. Pt. With SBA for balance aspects with task with  AD at raised counter Pt with visual perception task with discrimination of various shapes and sizes x 10  min with standing bal and min cues throught out with weight shifting and use of BUE's incorporated and crossing mid line and facilitation with posture in prep for home management.  Pt. Also with standing act with table slides at counter area with ues of RUE only with task.     Pt.  Seated With 1# dumbell with LUE  with 2x20 reps with bicep curls horz adb/add and RUE with 2#  Dumb bell bicep curls horz adb/add   to increase BUE ROM and strength.     Pt. With standing and  therex performed to increase ROM, endurance selfcare task and fxl mobility for independence     Patient left supine with all lines intact and call button in reach    ASSESSMENT:  Kaylee Romero is a 74 y.o. female with a medical diagnosis of E. coli sepsis Pt. participated well with session on this day.Pt demos physical deficits with balance  functional mobility, UB strength, endurance  level of functional indep with daily tasks and activities and selfcare skills .Pt. Will continue to benefit from continued OT to progress towards goals      Rehab identified problem list/impairments: weakness, impaired endurance, gait instability, impaired cardiopulmonary response to activity, pain    Rehab potential is fair    Activity tolerance: Fair    Discharge recommendations:       Barriers to discharge: None     Equipment recommendations:       GOALS:   Multidisciplinary Problems     Occupational Therapy Goals        Problem: Occupational Therapy Goal    Goal Priority Disciplines Outcome Interventions   Occupational Therapy Goal     OT, PT/OT Ongoing (interventions implemented as appropriate)    Description:  Goals to be met by: 9/17/19     Patient will increase functional independence with ADLs by performing:    Pt to perform the following by d/c::    Bed mobility with Mod I.  Toileting with Mod I.  Bathing with Mod I.  UBD with Mod I.  LBD with Mod I.  Grooming with Mod I.  B UE exercise program x 15 mins.  Functional standing activity x 10 mins.                     Plan:  Patient to be seen 5 x/week to address the above listed problems via self-care/home management, therapeutic activities, therapeutic exercises  Plan of Care expires: 10/10/19  Plan of Care reviewed with: patient  The INGRID and CRISTÓBAL have collaborated and discussed the patient's status, treatment plan and progress toward established goals.   CRISTÓBAL Morales/FARRUKH 9/12/2019

## 2019-09-12 NOTE — PLAN OF CARE
Problem: Adult Inpatient Plan of Care  Goal: Plan of Care Review  Patient co shoulder pain, left, incision chest intact and approximated, cont to monitor.

## 2019-09-12 NOTE — PLAN OF CARE
[facility] - Skilled Nursing Care    HOME HEALTH ORDERS  FACE TO FACE ENCOUNTER    Patient Name: Kaylee Romero  YOB: 1945    PCP: Liz Roberts MD   PCP Address: [address] / [place] [address]  PCP Phone Number: [phone]  PCP Fax: [phone]    Encounter Date: 09/12/2019    Admit to Home Health    Diagnoses:  Active Hospital Problems    Diagnosis  POA    *E. coli sepsis [A41.51]  Yes    E coli bacteremia [R78.81]  Yes    E. coli UTI [N39.0, B96.20]  Yes    Acute blood loss anemia [D62]  Yes    S/P CABG (coronary artery bypass graft) [Z95.1]  Not Applicable    Coronary artery disease involving native coronary artery of native heart with angina pectoris [I25.119]  Yes    Type 2 diabetes mellitus with stage 3 chronic kidney disease and hypertension [E11.22, I12.9, N18.3]  Yes    Physical deconditioning [R53.81]  Yes    Morbid obesity with body mass index (BMI) of 40.0 or higher [E66.01]  Yes    Hyperlipidemia [E78.5]  Yes      Resolved Hospital Problems   No resolved problems to display.       Future Appointments   Date Time Provider Department Center   9/17/2019  1:30 PM Pop Henderson MD [facility] CARDIO [facility]   10/2/2019 11:00 AM EKG, APPT [facility] EKG [facility]   10/2/2019 11:15 AM [facility] [id number] LB LIMIT [facility] XRAY OP [facility]   10/2/2019 12:00 PM Peterson Ventura MD [facility] CARDVAS [facility]   10/4/2019  1:30 PM Liz Roberts MD [facility]       I have seen and examined this patient face to face today. My clinical findings that support the need for the home health skilled services and home bound status are the following:  Weakness/numbness causing balance and gait disturbance due to Surgery making it taxing to leave home.    Allergies:  Review of patient's allergies indicates:   Allergen Reactions    Bactrim [sulfamethoxazole-trimethoprim] Other (See Comments)     Generic version  Sulfa makes her sick    Keflex [cephalexin] Other (See  Comments)     Turns orange       Diet: cardiac diet and diabetic diet: 2000 calorie    Activities: activity as tolerated; maintain sternal precautions- no lifting more than 5 lb, no pushing or pulling more than 5 lb, no driving    Nursing:   SN to complete comprehensive assessment including routine vital signs. Instruct on disease process and s/s of complications to report to MD. Review/verify medication list sent home with the patient at time of discharge  and instruct patient/caregiver as needed. Frequency may be adjusted depending on start of care date.    Notify MD if SBP > 160 or < 90; DBP > 90 or < 50; HR > 120 or < 50; Temp > 101    CONSULTS:    Physical Therapy to evaluate and treat. Evaluate for home safety and equipment needs; Establish/upgrade home exercise program. Perform / instruct on therapeutic exercises, gait training, transfer training, and Range of Motion.  Occupational Therapy to evaluate and treat. Evaluate home environment for safety and equipment needs. Perform/Instruct on transfers, ADL training, ROM, and therapeutic exercises.  Aide to provide assistance with personal care, ADLs, and vital signs.    MISCELLANEOUS CARE:  Diabetic Care:   SN to perform and educate Diabetic management with blood glucose monitoring:, Fingerstick blood sugar AC and HS and Report CBG < 60 or > 350 to physician.    WOUND CARE ORDERS    Keep midsternal incision, CT sites, and Left leg graft sites clean and dry- cleanse with soap and water daily; pat dry.  Tension sutures to midsternal incision to be removed at postop appointment.     Medications: Review discharge medications with patient and family and provide education.        I certify that this patient is confined to her home and needs intermittent skilled nursing care, physical therapy and occupational therapy.

## 2019-09-12 NOTE — PLAN OF CARE
Problem: Occupational Therapy Goal  Goal: Occupational Therapy Goal  Goals to be met by: 9/17/19     Patient will increase functional independence with ADLs by performing:    Pt to perform the following by d/c::    Bed mobility with Mod I.  Toileting with Mod I.  Bathing with Mod I.  UBD with Mod I.  LBD with Mod I.  Grooming with Mod I.  B UE exercise program x 15 mins.  Functional standing activity x 10 mins.        Outcome: Ongoing (interventions implemented as appropriate)  .

## 2019-09-13 LAB
POCT GLUCOSE: 171 MG/DL (ref 70–110)
POCT GLUCOSE: 199 MG/DL (ref 70–110)
POCT GLUCOSE: 223 MG/DL (ref 70–110)
POCT GLUCOSE: 261 MG/DL (ref 70–110)

## 2019-09-13 PROCEDURE — 97530 THERAPEUTIC ACTIVITIES: CPT

## 2019-09-13 PROCEDURE — 97116 GAIT TRAINING THERAPY: CPT

## 2019-09-13 PROCEDURE — 11000004 HC SNF PRIVATE

## 2019-09-13 PROCEDURE — 97535 SELF CARE MNGMENT TRAINING: CPT

## 2019-09-13 PROCEDURE — 97803 MED NUTRITION INDIV SUBSEQ: CPT

## 2019-09-13 PROCEDURE — 25000003 PHARM REV CODE 250: Performed by: NURSE PRACTITIONER

## 2019-09-13 PROCEDURE — 25000003 PHARM REV CODE 250: Performed by: PHYSICIAN ASSISTANT

## 2019-09-13 PROCEDURE — 63600175 PHARM REV CODE 636 W HCPCS: Performed by: INTERNAL MEDICINE

## 2019-09-13 PROCEDURE — 97110 THERAPEUTIC EXERCISES: CPT

## 2019-09-13 RX ADMIN — OXYCODONE HYDROCHLORIDE 5 MG: 5 TABLET ORAL at 01:09

## 2019-09-13 RX ADMIN — GABAPENTIN 100 MG: 100 CAPSULE ORAL at 02:09

## 2019-09-13 RX ADMIN — ASPIRIN 325 MG: 325 TABLET, COATED ORAL at 09:09

## 2019-09-13 RX ADMIN — DICLOFENAC SODIUM 2 G: 10 GEL TOPICAL at 09:09

## 2019-09-13 RX ADMIN — INSULIN ASPART 3 UNITS: 100 INJECTION, SOLUTION INTRAVENOUS; SUBCUTANEOUS at 09:09

## 2019-09-13 RX ADMIN — LOSARTAN POTASSIUM 100 MG: 50 TABLET, FILM COATED ORAL at 09:09

## 2019-09-13 RX ADMIN — DICLOFENAC SODIUM 2 G: 10 GEL TOPICAL at 02:09

## 2019-09-13 RX ADMIN — ENOXAPARIN SODIUM 40 MG: 100 INJECTION SUBCUTANEOUS at 06:09

## 2019-09-13 RX ADMIN — DICLOFENAC SODIUM 2 G: 10 GEL TOPICAL at 08:09

## 2019-09-13 RX ADMIN — METOPROLOL SUCCINATE 100 MG: 50 TABLET, EXTENDED RELEASE ORAL at 09:09

## 2019-09-13 RX ADMIN — ATORVASTATIN CALCIUM 80 MG: 20 TABLET, FILM COATED ORAL at 09:09

## 2019-09-13 RX ADMIN — GLIPIZIDE 20 MG: 5 TABLET ORAL at 09:09

## 2019-09-13 RX ADMIN — GABAPENTIN 100 MG: 100 CAPSULE ORAL at 09:09

## 2019-09-13 RX ADMIN — GABAPENTIN 100 MG: 100 CAPSULE ORAL at 08:09

## 2019-09-13 RX ADMIN — INSULIN ASPART 2 UNITS: 100 INJECTION, SOLUTION INTRAVENOUS; SUBCUTANEOUS at 12:09

## 2019-09-13 RX ADMIN — FAMOTIDINE 20 MG: 20 TABLET, FILM COATED ORAL at 09:09

## 2019-09-13 RX ADMIN — AMLODIPINE BESYLATE 10 MG: 10 TABLET ORAL at 09:09

## 2019-09-13 NOTE — TREATMENT PLAN
Rehab Services' DME recommendations    Kaylee Romero  MRN: [id number]     [x] Shower Chair With back     [x] Home health SLP, MSW and Nurse    CRISTÓBAL Morales/FARRUKH 9/13/2019

## 2019-09-13 NOTE — PROGRESS NOTES
SW met with patient to discuss dc plans. Patient lives home with spouse and son. Patient has adequate post discharge support. Prior to admission patient was independent with ADL's. It was reported that patient used a cane in public. Patient has [facility] services with [facility]. SW assessed and patient denied any concerns regarding dc. SW will remain available.     Liliya Ochoa LMSW (Ronnie)

## 2019-09-13 NOTE — PT/OT/SLP PROGRESS
Occupational Therapy  Treatment    Kaylee Romero   MRN: [id number]   Admitting Diagnosis: E. coli sepsis    OT Date of Treatment: 09/13/19       Billable Minutes:  Self Care/Home Management 46    General Precautions: Standard, fall, sternal  Orthopedic Precautions: N/A  Braces: N/A         Subjective:  Communicated with nsg prior to session.  I am doing well today    Pain/Comfort  Pain Rating 1: 0/10  Pain Rating Post-Intervention 1: 0/10    Objective:   Pt. Seated in chair with  present     Occupational Performance:    Bed Mobility:    · Not tested    Functional Mobility/Transfers:  · Patient completed Sit <> Stand Transfer with stand by assistance  with  no assistive device  from w/c with cues for safety and hand placement       Activities of Daily Living:  · Grooming: minimum assistance at time for use of  no rinse shampoo cap  · Upper Body Dressing: stand by assistance to candace house coat with in precautions  · Lower Body Dressing: stand by assistance to candace socks and underware     AMPAC 6 Click:  AMPAC Total Score: 22      Additional Treatment:  Pt. With family training held on this day with  and reviewed t/f's, selfcare skills and DME shower chair  and level of (A) for home upon d/c and selfcare skills as it relates into sternal precautions and maintaining     Patient left up in chair with  and PT present    ASSESSMENT:  Kaylee Romero is a 74 y.o. female with a medical diagnosis of E. coli sepsis Pt. participated well with session on this day.Pt demos physical deficits with balance  functional mobility, UB strength, endurance  level of functional indep with daily tasks and activities and selfcare skills .Pt. Will continue to benefit from continued OT to progress towards goals      Rehab identified problem list/impairments: weakness, impaired endurance, gait instability, impaired cardiopulmonary response to activity, pain    Rehab potential is fair    Activity tolerance:  Fair    Discharge recommendations:       Barriers to discharge: None     Equipment recommendations:       GOALS:   Multidisciplinary Problems     Occupational Therapy Goals        Problem: Occupational Therapy Goal    Goal Priority Disciplines Outcome Interventions   Occupational Therapy Goal     OT, PT/OT Ongoing (interventions implemented as appropriate)    Description:  Goals to be met by: 9/17/19     Patient will increase functional independence with ADLs by performing:    Pt to perform the following by d/c::    Bed mobility with Mod I.  Toileting with Mod I.  Bathing with Mod I.  UBD with Mod I.  LBD with Mod I.  Grooming with Mod I.  B UE exercise program x 15 mins.  Functional standing activity x 10 mins.                     Plan:  Patient to be seen 5 x/week to address the above listed problems via self-care/home management, therapeutic activities, therapeutic exercises  Plan of Care expires: 10/10/19  Plan of Care reviewed with: patient    JERONIMO Morales  09/13/2019

## 2019-09-13 NOTE — PT/OT/SLP PROGRESS
"Physical Therapy  Treatment/ Family Training    Kaylee Romero   MRN: [id number]   Admitting Diagnosis: E. coli sepsis    PT Received On: 09/13/19        Billable Minutes:  Gait Training 10, Therapeutic Activity 32, Therapeutic Exercise 15 and Total Time 57    Treatment Type: Treatment  PT/PTA: PT     PTA Visit Number: 0       General Precautions: Standard, fall, sternal  Orthopedic Precautions: N/A   Braces: N/A    Spiritual, Cultural Beliefs, Pentecostal Practices, Values that Affect Care: no    Subjective:  Communicated with patient prior to session.  Pt agreeable to session and  present for family training.     Pain/Comfort  Pain Rating 1: 0/10    Objective:  Patient found sitting in w/c.       AM-PAC 6 CLICK MOBILITY  Total Score:18    Bed Mobility:  Sit>Supine:on bed w/ SPV  Supine>Sit: on bed w/ ModA for trunk  2 trials each, HOB flat and w/o use of side rail    Transfers:  Sit<>Stand: to/from w/c (multple trials) w/o AD w/ SBA  Stand Pivot Transfer: to/from couch w/o AD w/ SBA  Car transfer (simulated): w/c<>nustep w/o AD w/ CGA for safety using 5.5" curb step to step up in to car (used due to pt's height)    Gait:  Amb 100ft w/o AD w/ SBA  Lateral sway noted causing instability     Advanced Gait:  Stairs: asc/andre 4 steps w/ LHR and CGA/HHA for stability  Curb Step: asc/andre 5.5" curb w/o AD w/ Min/HHA for stability    Additional Treatment:  Seated LBE x15min to inc overall strength/endurance    Pt's  completed family training on this date. He observed and participated in assisting pt w/ bed mobility, transfers, car transfer, ambulation, stairs, and curb step. Pt and her  were also educated on DME recommendations along w/ role of HH therapy. Both verb understanding.    Patient left up in chair with call button in reach.    Assessment:  Kaylee Romero is a 74 y.o. female with a medical diagnosis of E. coli sepsis.  Pt herminia session well w/ good participation. Family training was completed " successfully w/ her . She is progressing well and will continue PT POC to prepare for upcoming D/C home.    Rehab identified problem list/impairments: impaired endurance, impaired sensation, impaired self care skills, impaired functional mobilty, gait instability, impaired balance, decreased upper extremity function, decreased lower extremity function, pain, edema, impaired cardiopulmonary response to activity, orthopedic precautions    Rehab potential is good.    Activity tolerance: Good    Discharge recommendations: home with home health     Barriers to discharge: Inaccessible home environment    Equipment recommendations: commode, tub bench     GOALS:   Multidisciplinary Problems     Physical Therapy Goals        Problem: Physical Therapy Goal    Goal Priority Disciplines Outcome Goal Variances Interventions   Physical Therapy Goal     PT, PT/OT Ongoing (interventions implemented as appropriate)     Description:  Goals to be met by: 19     Patient will increase functional independence with mobility by performin. Supine to sit with Stand-by Assistance  2. Sit to supine with Modified [place]  3. Sit to stand transfer with Supervision  4. Bed to chair transfer with Supervision w/o AD  5. Gait  x 150 feet with Supervision w/o AD  6. Ascend/descend 4 stair with left Handrail for balance only and w/ Stand-by Assistance  7. Ascend/Descend 4 inch curb step with Stand-by Assistance w/o AD  8. Stand for 3 minutes with Stand-by Assistance w/o AD while performing activity                      PLAN:    Patient to be seen 5 x/week  to address the above listed problems via gait training, therapeutic activities, therapeutic exercises  Plan of Care expires: 10/10/19  Plan of Care reviewed with: patient    Carmel Rodríguez, PT  2019

## 2019-09-13 NOTE — PLAN OF CARE
Problem: Occupational Therapy Goal  Goal: Occupational Therapy Goal  Goals to be met by: 9/17/19     Patient will increase functional independence with ADLs by performing:    Pt to perform the following by d/c::    Bed mobility with Mod I.  Toileting with Mod I.  Bathing with Mod I.  UBD with Mod I.  LBD with Mod I.  Grooming with Mod I.  B UE exercise program x 15 mins.  Functional standing activity x 10 mins.-MET        Outcome: Ongoing (interventions implemented as appropriate)  .    Comments: .

## 2019-09-13 NOTE — PLAN OF CARE
"Problem: Adult Inpatient Plan of Care  Goal: Plan of Care Review  Outcome: Ongoing (interventions implemented as appropriate)  #1 Obesity related to over consumption of calories as evidenced by BMI 36.     #2 Knowledge deficit regarding cardiac diabetic diet as evidenced by pt request for more information despite out patient counseling on diabetic diet.     #3 Inadequate protein and energy intake related to lack of appetite post op , taste changes significant as evidenced by PO intake < 50% for one month post op CABG, with patient stating "I could afford to lose some weight"  In the presence of increased needs for healing and improved immunity     Plan:  Carbohydrate modified diet- 2000 calories  Fluid modified diet- 2000 ml  Commercial beverage - protein- boost glucose bid  Nutrition education- TLC Diabetic diet prior to dc      "

## 2019-09-13 NOTE — PLAN OF CARE
Problem: Physical Therapy Goal  Goal: Physical Therapy Goal  Goals to be met by: 19     Patient will increase functional independence with mobility by performin. Supine to sit with Stand-by Assistance  2. Sit to supine with Modified [place]  3. Sit to stand transfer with Supervision  4. Bed to chair transfer with Supervision w/o AD  5. Gait  x 150 feet with Supervision w/o AD  6. Ascend/descend 4 stair with left Handrail for balance only and w/ Stand-by Assistance  7. Ascend/Descend 4 inch curb step with Stand-by Assistance w/o AD  8. Stand for 3 minutes with Stand-by Assistance w/o AD while performing activity     Outcome: Ongoing (interventions implemented as appropriate)  LTGs remain appropriate. Pt will continue PT POC.    Carmel Rodríguez, PT  2019

## 2019-09-13 NOTE — PROGRESS NOTES
"[facility] - Skilled Nursing Care  Adult Nutrition  Progress Note    SUMMARY   Recommendations    Recommendation/Intervention: Continue diabetic diet 2000 kcalories, 2000 ml fluid restrciton, recommend zinc sulfate 220 mg for 30 days   Goals: PO to meet 85% of EEN/EPN by next RD visit  Nutrition Goal Status: progressing towards goal  Communication of RD Recs: reviewed with physician    Reason for Assessment    Reason For Assessment: RD follow-up  Diagnosis: (SIRS, Debility)  Relevant Medical History: DM2, CKD 3, HLD, UTI, GERD, HF, CAD, osteoporosis, anemia, KAMRAN, CABG 19,   Interdisciplinary Rounds: attended  General Information Comments: eating weel,   Nutrition Discharge Planning: DC on cardiac diabetic diet,     Nutrition Risk Screen    Nutrition Risk Screen: no indicators present    Nutrition/Diet History    Typical Food/Fluid Intake: one meal per day despite, diabetes out pt clinic education to eat more frequently, pt just retired may be able to eat more often, she says she may snack but cannot think about a meal  Food Preferences: no milk  Spiritual, Cultural Beliefs, Temple Practices, Values that Affect Care: no  Food Allergies: NKFA  Factors Affecting Nutritional Intake: altered taste, decreased appetite    Anthropometrics    Temp: 98.6 °F (37 °C)  Height: 5' 5" (165.1 cm)  Height (inches): 65 in  Weight Method: Standard Scale  Weight: 98.2 kg (216 lb 7.9 oz)  Weight (lb): 216.49 lb  Ideal Body Weight (IBW), Female: 125 lb  % Ideal Body Weight, Female (lb): 173.54 lb  BMI (Calculated): 36.2  BMI Grade: 35 - 39.9 - obesity - grade II  Weight Loss: unintentional  Usual Body Weight (UBW), k.2 kg  % Usual Body Weight: 96.48  % Weight Change From Usual Weight: -3.72 %       Lab/Procedures/Meds    Pertinent Labs Reviewed: reviewed  Pertinent Labs Comments: Na 132, glucose 261, HgA1c 7.75,   Pertinent Medications Reviewed: reviewed  Pertinent Medications Comments: ASA, statin, glipizide, Mg, " "lovenox    Estimated/Assessed Needs    Weight Used For Calorie Calculations: 98.4 kg (216 lb 14.9 oz)  Energy Calorie Requirements (kcal): 1867  Energy Need Method: [place]-St Jeor  Protein Requirements: 85g  Weight Used For Protein Calculations: 56.8 kg (125 lb 3.5 oz)(DBW kg x 1.5g)  Fluid Requirements (mL): per MD 2000 Fluid restriction(pt notifed of restriciton)     RDA Method (mL): 1867  CHO Requirement: 50% of calories      Nutrition Prescription Ordered    Current Diet Order: 2000 diabetic , 2000 ml fluid restriction, (2000 ml fluid restriction new )  Nutrition Order Comments: pt eating well , she states she is drinking the boost between meals  Oral Nutrition Supplement: Boost glucose bid    Evaluation of Received Nutrient/Fluid Intake    Energy Calories Required: meeting needs  Protein Required: meeting needs  Fluid Required: meeting needs  Tolerance: tolerating  % Intake of Estimated Energy Needs: 75 - 100 %  % Meal Intake: 75 - 100 %    Nutrition Risk    Level of Risk/Frequency of Follow-up: low     Assessment   Obesity related to over consumption of calories as evidenced by BMI 36.    Knowledge deficit regarding cardiac diabetic diet as evidenced by pt request for more information despite out patient counseling on diabetic diet.    Inadequate protein and energy intake related to lack of appetite post op , taste changes significant as evidenced by PO intake < 50% for one month post op CABG, with patient stating "I could afford to lose some weight"  In the presence of increased needs for healing and improved immunity    Plan:  Carbohydrate modified diet- 2000 calories  Fluid modified diet- 2000 ml  Commercial beverage - protein- boost glucose bid  Nutrition education- TLC Diabetic diet prior to dc      Monitor and Evaluation    Food and Nutrient Intake: food and beverage intake  Food and Nutrient Adminstration: diet order  Knowledge/Beliefs/Attitudes: food and nutrition knowledge/skill  Physical Activity " and Function: nutrition-related ADLs and IADLs  Anthropometric Measurements: weight change  Biochemical Data, Medical Tests and Procedures: glucose/endocrine profile, inflammatory profile, electrolyte and renal panel, gastrointestinal profile  Nutrition-Focused Physical Findings: overall appearance     Malnutrition Assessment   9/11/19     Skin (Micronutrient): wounds unhealed, edema  Eyes (Micronutrient): conjunctiva dull  Extraoral (Micronutrient): (chapped lips)  Musculoskeletal/Lower Extremities: muscle wasting, edema       Weight Loss (Malnutrition): (4% loss in one month post op)  Energy Intake (Malnutrition): less than or equal to 50% for greater than or equal to 1 month   Orbital Region (Subcutaneous Fat Loss): well nourished  Upper Arm Region (Subcutaneous Fat Loss): well nourished  Thoracic and Lumbar Region: well nourished   Rastafari Region (Muscle Loss): well nourished  Clavicle Bone Region (Muscle Loss): well nourished  Clavicle and Acromion Bone Region (Muscle Loss): well nourished  Scapular Bone Region (Muscle Loss): well nourished  Dorsal Hand (Muscle Loss): mild depletion  Posterior Calf Region (Muscle Loss): mild depletion   Edema (Fluid Accumulation): 1-->trace             Nutrition Follow-Up    RD Follow-up?: Yes

## 2019-09-14 LAB
POCT GLUCOSE: 157 MG/DL (ref 70–110)
POCT GLUCOSE: 183 MG/DL (ref 70–110)
POCT GLUCOSE: 227 MG/DL (ref 70–110)
POCT GLUCOSE: 262 MG/DL (ref 70–110)

## 2019-09-14 PROCEDURE — 97116 GAIT TRAINING THERAPY: CPT

## 2019-09-14 PROCEDURE — 11000004 HC SNF PRIVATE

## 2019-09-14 PROCEDURE — 25000003 PHARM REV CODE 250: Performed by: PHYSICIAN ASSISTANT

## 2019-09-14 PROCEDURE — 25000003 PHARM REV CODE 250: Performed by: NURSE PRACTITIONER

## 2019-09-14 PROCEDURE — 97110 THERAPEUTIC EXERCISES: CPT

## 2019-09-14 PROCEDURE — 97530 THERAPEUTIC ACTIVITIES: CPT

## 2019-09-14 PROCEDURE — 63600175 PHARM REV CODE 636 W HCPCS: Performed by: INTERNAL MEDICINE

## 2019-09-14 RX ADMIN — INSULIN ASPART 2 UNITS: 100 INJECTION, SOLUTION INTRAVENOUS; SUBCUTANEOUS at 12:09

## 2019-09-14 RX ADMIN — LOSARTAN POTASSIUM 100 MG: 50 TABLET, FILM COATED ORAL at 08:09

## 2019-09-14 RX ADMIN — ASPIRIN 325 MG: 325 TABLET, COATED ORAL at 08:09

## 2019-09-14 RX ADMIN — DICLOFENAC SODIUM 2 G: 10 GEL TOPICAL at 08:09

## 2019-09-14 RX ADMIN — GABAPENTIN 100 MG: 100 CAPSULE ORAL at 08:09

## 2019-09-14 RX ADMIN — DICLOFENAC SODIUM 2 G: 10 GEL TOPICAL at 02:09

## 2019-09-14 RX ADMIN — METOPROLOL SUCCINATE 100 MG: 50 TABLET, EXTENDED RELEASE ORAL at 08:09

## 2019-09-14 RX ADMIN — ENOXAPARIN SODIUM 40 MG: 100 INJECTION SUBCUTANEOUS at 05:09

## 2019-09-14 RX ADMIN — GABAPENTIN 100 MG: 100 CAPSULE ORAL at 02:09

## 2019-09-14 RX ADMIN — AMLODIPINE BESYLATE 10 MG: 10 TABLET ORAL at 08:09

## 2019-09-14 RX ADMIN — GLIPIZIDE 20 MG: 5 TABLET ORAL at 08:09

## 2019-09-14 RX ADMIN — FAMOTIDINE 20 MG: 20 TABLET, FILM COATED ORAL at 08:09

## 2019-09-14 RX ADMIN — ATORVASTATIN CALCIUM 80 MG: 20 TABLET, FILM COATED ORAL at 08:09

## 2019-09-14 RX ADMIN — INSULIN ASPART 3 UNITS: 100 INJECTION, SOLUTION INTRAVENOUS; SUBCUTANEOUS at 10:09

## 2019-09-14 NOTE — PLAN OF CARE
Problem: Adult Inpatient Plan of Care  Goal: Plan of Care Review  Outcome: Ongoing (interventions implemented as appropriate)  Plan of care reviewed with Patient. Interventions documented on MAR and flow sheet. Denies c/o pain at present time. No fall or injury noted this shift.   No acute distressed noted. Call light in reach.

## 2019-09-14 NOTE — PT/OT/SLP PROGRESS
"Physical Therapy  Treatment/DC summary    Kaylee Romero   MRN: [id number]   Admitting Diagnosis: E. coli sepsis    PT Received On: 09/14/19          Billable Minutes:  Therapeutic Activity 30 and Therapeutic Exercise 15    Treatment Type: Treatment  PT/PTA: PTA, PT     PTA Visit Number: 0       General Precautions: Standard, fall, sternal  Orthopedic Precautions: N/A   Braces: N/A    Spiritual, Cultural Beliefs, Confucianism Practices, Values that Affect Care: no    Subjective:  Communicated with pt prior to session.  Pt reports extreme pain in her shoulder to where she "loses [her] mind".    Pain/Comfort  Pain Rating 1: 8/10  Location - Side 1: Left  Location - Orientation 1: generalized(anterior to chest wall, posterior by scapula- varied)  Location 1: shoulder  Pain Addressed 1: Pre-medicate for activity  Pain Rating Post-Intervention 1: 8/10   LUE: Pain moved from anterior to posterior to elbow back to seemingly anterior L chest wall and posterior around scapula.     Objective:  Patient found seated in        AM-PAC 6 CLICK MOBILITY  Total Score:18    Bed Mobility:  Sit>Supine: supervision  Supine>Sit: supervision    Transfers:  Sit<>Stand: SBA from - edu'd on proper technique  Stand Pivot Transfer: sup-SBA    Gait:  Amb 150 feet with no AD, SBA for slight instability but no real loss of balance     Therex:  Scapular adduction x 10 reps for scapular stability  Supine- gentle grade I oscillations to L shoulder with PROM (ER approaching 90 degrees) to 90 degrees only for abduction, flexion with pain relief for pt    Balance:  Needs SBA for standing balance, as she appears unstable at times.    Additional Treatment:  Completed 10 minutes on recumbent stepper at level 2 to improve LE strength and endurance (did not utilize UEs*).  Pt was able to verbalize her own sternal precautions.       Patient left up in chair with call button in reach.    Assessment:  Kaylee Romero is a 74 y.o. female with a medical " diagnosis of E. coli sepsis.  Mrs. Romero met 5/8 goals total. Will benefit from further PT services to improve her overall stability. Seems as though her L shoulder pain may be referred pain, as it was inconsistent.    Rehab identified problem list/impairments: impaired endurance, impaired sensation, impaired self care skills, impaired functional mobilty, gait instability, impaired balance, decreased upper extremity function, decreased lower extremity function, pain, edema, impaired cardiopulmonary response to activity, orthopedic precautions    Rehab potential is good.    Activity tolerance: Good    Discharge recommendations: home with home health     Barriers to discharge: Inaccessible home environment    Equipment recommendations: commode, tub bench     GOALS:   Multidisciplinary Problems     Physical Therapy Goals     Not on file          Multidisciplinary Problems (Resolved)        Problem: Physical Therapy Goal    Goal Priority Disciplines Outcome Goal Variances Interventions   Physical Therapy Goal   (Resolved)     PT, PT/OT Outcome(s) achieved     Description:  Goals to be met by: 19     Patient will increase functional independence with mobility by performin. Supine to sit with Stand-by Assistance met (2019)  2. Sit to supine with Modified [place] met (2019)  3. Sit to stand transfer with Supervision met (2019)  4. Bed to chair transfer with Supervision w/o AD met (2019)  5. Gait  x 150 feet with Supervision w/o AD not met  6. Ascend/descend 4 stair with left Handrail for balance only and w/ Stand-by Assistance not met  7. Ascend/Descend 4 inch curb step with Stand-by Assistance w/o AD not met  8. Stand for 3 minutes with Stand-by Assistance w/o AD while performing activity met (2019)                       PLAN:    Patient to be seen 5 x/week  to address the above listed problems via gait training, therapeutic activities, therapeutic exercises  Plan of Care expires:  10/10/19  Plan of Care reviewed with: patient    Chata BAJWA Scottie, PT  09/14/2019

## 2019-09-14 NOTE — PLAN OF CARE
Problem: Physical Therapy Goal  Goal: Physical Therapy Goal  Goals to be met by: 19     Patient will increase functional independence with mobility by performin. Supine to sit with Stand-by Assistance met (2019)  2. Sit to supine with Modified [place] met (2019)  3. Sit to stand transfer with Supervision met (2019)  4. Bed to chair transfer with Supervision w/o AD met (2019)  5. Gait  x 150 feet with Supervision w/o AD not met  6. Ascend/descend 4 stair with left Handrail for balance only and w/ Stand-by Assistance not met  7. Ascend/Descend 4 inch curb step with Stand-by Assistance w/o AD not met  8. Stand for 3 minutes with Stand-by Assistance w/o AD while performing activity met (2019)     Outcome: Outcome(s) achieved Date Met: 19  Met 5/8 goals.

## 2019-09-14 NOTE — PLAN OF CARE
Problem: Diabetes Comorbidity  Goal: Blood Glucose Level Within Desired Range  Outcome: Ongoing (interventions implemented as appropriate)  Monitor blood sugars. Give sliding scale insulin with needed.

## 2019-09-15 LAB
POCT GLUCOSE: 194 MG/DL (ref 70–110)
POCT GLUCOSE: 220 MG/DL (ref 70–110)
POCT GLUCOSE: 261 MG/DL (ref 70–110)
POCT GLUCOSE: 268 MG/DL (ref 70–110)

## 2019-09-15 PROCEDURE — 25000003 PHARM REV CODE 250: Performed by: NURSE PRACTITIONER

## 2019-09-15 PROCEDURE — 94761 N-INVAS EAR/PLS OXIMETRY MLT: CPT

## 2019-09-15 PROCEDURE — 97530 THERAPEUTIC ACTIVITIES: CPT

## 2019-09-15 PROCEDURE — 25000003 PHARM REV CODE 250: Performed by: PHYSICIAN ASSISTANT

## 2019-09-15 PROCEDURE — 97110 THERAPEUTIC EXERCISES: CPT

## 2019-09-15 PROCEDURE — 63600175 PHARM REV CODE 636 W HCPCS: Performed by: INTERNAL MEDICINE

## 2019-09-15 PROCEDURE — 11000004 HC SNF PRIVATE

## 2019-09-15 RX ORDER — LIDOCAINE 50 MG/G
1 PATCH TOPICAL
Status: DISCONTINUED | OUTPATIENT
Start: 2019-09-15 | End: 2019-09-16 | Stop reason: HOSPADM

## 2019-09-15 RX ADMIN — LIDOCAINE 1 PATCH: 50 PATCH TOPICAL at 05:09

## 2019-09-15 RX ADMIN — OXYCODONE HYDROCHLORIDE 5 MG: 5 TABLET ORAL at 12:09

## 2019-09-15 RX ADMIN — DICLOFENAC SODIUM 2 G: 10 GEL TOPICAL at 08:09

## 2019-09-15 RX ADMIN — METOPROLOL SUCCINATE 100 MG: 50 TABLET, EXTENDED RELEASE ORAL at 09:09

## 2019-09-15 RX ADMIN — FAMOTIDINE 20 MG: 20 TABLET, FILM COATED ORAL at 09:09

## 2019-09-15 RX ADMIN — ACETAMINOPHEN 1000 MG: 500 TABLET ORAL at 10:09

## 2019-09-15 RX ADMIN — ENOXAPARIN SODIUM 40 MG: 100 INJECTION SUBCUTANEOUS at 05:09

## 2019-09-15 RX ADMIN — GABAPENTIN 100 MG: 100 CAPSULE ORAL at 09:09

## 2019-09-15 RX ADMIN — GABAPENTIN 100 MG: 100 CAPSULE ORAL at 02:09

## 2019-09-15 RX ADMIN — GABAPENTIN 100 MG: 100 CAPSULE ORAL at 08:09

## 2019-09-15 RX ADMIN — ONDANSETRON 4 MG: 4 TABLET, ORALLY DISINTEGRATING ORAL at 02:09

## 2019-09-15 RX ADMIN — INSULIN ASPART 3 UNITS: 100 INJECTION, SOLUTION INTRAVENOUS; SUBCUTANEOUS at 12:09

## 2019-09-15 RX ADMIN — ATORVASTATIN CALCIUM 80 MG: 20 TABLET, FILM COATED ORAL at 09:09

## 2019-09-15 RX ADMIN — INSULIN ASPART 1 UNITS: 100 INJECTION, SOLUTION INTRAVENOUS; SUBCUTANEOUS at 09:09

## 2019-09-15 RX ADMIN — INSULIN ASPART 3 UNITS: 100 INJECTION, SOLUTION INTRAVENOUS; SUBCUTANEOUS at 05:09

## 2019-09-15 RX ADMIN — DICLOFENAC SODIUM 2 G: 10 GEL TOPICAL at 02:09

## 2019-09-15 RX ADMIN — LOSARTAN POTASSIUM 100 MG: 50 TABLET, FILM COATED ORAL at 09:09

## 2019-09-15 RX ADMIN — ASPIRIN 325 MG: 325 TABLET, COATED ORAL at 09:09

## 2019-09-15 RX ADMIN — DICLOFENAC SODIUM 2 G: 10 GEL TOPICAL at 09:09

## 2019-09-15 RX ADMIN — AMLODIPINE BESYLATE 10 MG: 10 TABLET ORAL at 09:09

## 2019-09-15 NOTE — PLAN OF CARE
Problem: Adult Inpatient Plan of Care  Goal: Plan of Care Review  Outcome: Ongoing (interventions implemented as appropriate)  Plan of care reviewed with patient. Resident able to verbalize needs and concerns. Will continue to monitor.

## 2019-09-15 NOTE — PLAN OF CARE
Problem: Fall Injury Risk  Goal: Absence of Fall and Fall-Related Injury  Outcome: Ongoing (interventions implemented as appropriate)  No fall or injury noted at this time. Patient able and encouraged to utilize call light when assistance is needed. Safety maintained will continue to monitor.

## 2019-09-15 NOTE — PT/OT/SLP PROGRESS
Occupational Therapy  Treatment    Kaylee Romero   MRN: [id number]   Admitting Diagnosis: E. coli sepsis    OT Date of Treatment: 09/15/19       Billable Minutes:  Therapeutic Activity 28 and Therapeutic Exercise 10    General Precautions: Standard, fall, sternal  Orthopedic Precautions: N/A  Braces: N/A         Subjective:  Communicated with nurse prior to session.      Pain/Comfort  Pain Rating 1: 9/10  Location - Side 1: Left  Location - Orientation 1: generalized  Location 1: shoulder  Pain Addressed 1: Pre-medicate for activity, Reposition, Distraction  Pain Rating Post-Intervention 1: 4/10    Objective:  Patient found with: (no lines and seated in W/C)    Occupational Performance:    Bed Mobility:    · Pt seated in W/C at onset of therapy session.    Functional Mobility/Transfers:  · Patient completed Sit <> Stand Transfer with stand by assistance  with  no assistive device   · Patient completed Bed <> Chair Transfer using Step Transfer technique with stand by assistance with no assistive device  · Functional Mobility: Pt ambulated from her room to therapy gym with no A/D and SBA a distance of 176ft .  · Pt ascended and descended 12 steps steadying with (R) UE with SBA . No loss of balance demonstrated but brief rest break taken after ascending steps to landing before starting descending.     OT Exercises: Performed A/AROM to (L) UE 1 set 10 reps of pendulum while seated in W/C exercises.      Pt worked on functional standing activity consisting of standing with RW while reaching in all planes , crossing of midline and reaching to varying heights to facilitate (B) wt shifting and stability in standing to increase (I)ce when performing self care, and functional activities with standing component.  Pt tolerated up to 9 Min. in standing with     (S) and no A/D.      Additional Treatment:  Pt edu on Plan of care,  safety when performing functional transfers and functional standing activities.  - White board  updated    Patient left up in chair with call button in reach.    [facility] 6 Click:  [facility] Total Score: 22    ASSESSMENT:  Kaylee Romero is a 74 y.o. female with a medical diagnosis of E. coli sepsis . Pt tolerated Tx without incident. Initially , she reported severe pain (L) shld but pain diminished after Pt was given pain meds and ROM exercises were performed. Pt would continue to benefit from OT intervention.    Rehab identified problem list/impairments: weakness, impaired endurance, gait instability, impaired cardiopulmonary response to activity, pain    Rehab potential is good    Activity tolerance: Good    Discharge recommendations:       Barriers to discharge: None     Equipment recommendations:       GOALS:   Multidisciplinary Problems     Occupational Therapy Goals        Problem: Occupational Therapy Goal    Goal Priority Disciplines Outcome Interventions   Occupational Therapy Goal     OT, PT/OT Ongoing (interventions implemented as appropriate)    Description:  Goals to be met by: 9/17/19     Patient will increase functional independence with ADLs by performing:    Pt to perform the following by d/c::    Bed mobility with Mod I.  Toileting with Mod I.  Bathing with Mod I.  UBD with Mod I.  LBD with Mod I.  Grooming with Mod I.  B UE exercise program x 15 mins.  Functional standing activity x 10 mins.-MET                          Plan:  Patient to be seen 5 x/week to address the above listed problems via self-care/home management, therapeutic activities, therapeutic exercises  Plan of Care expires: 10/10/19  Plan of Care reviewed with: patient    SHE Nash/FARRUKH  09/15/2019

## 2019-09-16 ENCOUNTER — EXTERNAL HOME HEALTH (OUTPATIENT)
Dept: HOME HEALTH SERVICES | Facility: HOSPITAL | Age: 74
End: 2019-09-16
Payer: MEDICARE

## 2019-09-16 VITALS
OXYGEN SATURATION: 99 % | HEIGHT: 65 IN | HEART RATE: 97 BPM | SYSTOLIC BLOOD PRESSURE: 121 MMHG | DIASTOLIC BLOOD PRESSURE: 68 MMHG | WEIGHT: 223.13 LBS | TEMPERATURE: 98 F | RESPIRATION RATE: 18 BRPM | BODY MASS INDEX: 37.18 KG/M2

## 2019-09-16 LAB
ALBUMIN SERPL BCP-MCNC: 2.6 G/DL (ref 3.5–5.2)
ALP SERPL-CCNC: 90 U/L (ref 55–135)
ALT SERPL W/O P-5'-P-CCNC: 20 U/L (ref 10–44)
ANION GAP SERPL CALC-SCNC: 10 MMOL/L (ref 8–16)
AST SERPL-CCNC: 18 U/L (ref 10–40)
BASOPHILS # BLD AUTO: 0.04 K/UL (ref 0–0.2)
BASOPHILS NFR BLD: 1.1 % (ref 0–1.9)
BILIRUB SERPL-MCNC: 0.2 MG/DL (ref 0.1–1)
BUN SERPL-MCNC: 21 MG/DL (ref 8–23)
CALCIUM SERPL-MCNC: 8.9 MG/DL (ref 8.7–10.5)
CHLORIDE SERPL-SCNC: 105 MMOL/L (ref 95–110)
CO2 SERPL-SCNC: 20 MMOL/L (ref 23–29)
CREAT SERPL-MCNC: 1.2 MG/DL (ref 0.5–1.4)
DIFFERENTIAL METHOD: ABNORMAL
EOSINOPHIL # BLD AUTO: 0.2 K/UL (ref 0–0.5)
EOSINOPHIL NFR BLD: 5.3 % (ref 0–8)
ERYTHROCYTE [DISTWIDTH] IN BLOOD BY AUTOMATED COUNT: 17.6 % (ref 11.5–14.5)
EST. GFR  (AFRICAN AMERICAN): 52.2 ML/MIN/1.73 M^2
EST. GFR  (NON AFRICAN AMERICAN): 45.3 ML/MIN/1.73 M^2
GLUCOSE SERPL-MCNC: 224 MG/DL (ref 70–110)
HCT VFR BLD AUTO: 27.7 % (ref 37–48.5)
HGB BLD-MCNC: 8.4 G/DL (ref 12–16)
IMM GRANULOCYTES # BLD AUTO: 0.02 K/UL (ref 0–0.04)
IMM GRANULOCYTES NFR BLD AUTO: 0.5 % (ref 0–0.5)
LYMPHOCYTES # BLD AUTO: 1.1 K/UL (ref 1–4.8)
LYMPHOCYTES NFR BLD: 29.7 % (ref 18–48)
MAGNESIUM SERPL-MCNC: 1.5 MG/DL (ref 1.6–2.6)
MCH RBC QN AUTO: 27.8 PG (ref 27–31)
MCHC RBC AUTO-ENTMCNC: 30.3 G/DL (ref 32–36)
MCV RBC AUTO: 92 FL (ref 82–98)
MONOCYTES # BLD AUTO: 0.4 K/UL (ref 0.3–1)
MONOCYTES NFR BLD: 11.1 % (ref 4–15)
NEUTROPHILS # BLD AUTO: 2 K/UL (ref 1.8–7.7)
NEUTROPHILS NFR BLD: 52.3 % (ref 38–73)
NRBC BLD-RTO: 0 /100 WBC
PHOSPHATE SERPL-MCNC: 2.8 MG/DL (ref 2.7–4.5)
PLATELET # BLD AUTO: 263 K/UL (ref 150–350)
PMV BLD AUTO: 10.3 FL (ref 9.2–12.9)
POCT GLUCOSE: 228 MG/DL (ref 70–110)
POCT GLUCOSE: 241 MG/DL (ref 70–110)
POTASSIUM SERPL-SCNC: 4.5 MMOL/L (ref 3.5–5.1)
PROT SERPL-MCNC: 7.2 G/DL (ref 6–8.4)
RBC # BLD AUTO: 3.02 M/UL (ref 4–5.4)
SODIUM SERPL-SCNC: 135 MMOL/L (ref 136–145)
WBC # BLD AUTO: 3.8 K/UL (ref 3.9–12.7)

## 2019-09-16 PROCEDURE — 83735 ASSAY OF MAGNESIUM: CPT

## 2019-09-16 PROCEDURE — 99900058 HC 022 PAID UNDER SNF PPS

## 2019-09-16 PROCEDURE — 36415 COLL VENOUS BLD VENIPUNCTURE: CPT

## 2019-09-16 PROCEDURE — 84100 ASSAY OF PHOSPHORUS: CPT

## 2019-09-16 PROCEDURE — 80053 COMPREHEN METABOLIC PANEL: CPT

## 2019-09-16 PROCEDURE — 25000003 PHARM REV CODE 250: Performed by: NURSE PRACTITIONER

## 2019-09-16 PROCEDURE — 85025 COMPLETE CBC W/AUTO DIFF WBC: CPT

## 2019-09-16 PROCEDURE — 97803 MED NUTRITION INDIV SUBSEQ: CPT

## 2019-09-16 PROCEDURE — 25000003 PHARM REV CODE 250: Performed by: PHYSICIAN ASSISTANT

## 2019-09-16 RX ORDER — METOPROLOL SUCCINATE 100 MG/1
100 TABLET, EXTENDED RELEASE ORAL DAILY
Qty: 30 TABLET | Refills: 11 | Status: SHIPPED | OUTPATIENT
Start: 2019-09-17 | End: 2019-11-07 | Stop reason: ALTCHOICE

## 2019-09-16 RX ORDER — GABAPENTIN 100 MG/1
100 CAPSULE ORAL 3 TIMES DAILY
Qty: 90 CAPSULE | Refills: 11 | Status: SHIPPED | OUTPATIENT
Start: 2019-09-16 | End: 2019-10-09

## 2019-09-16 RX ORDER — ZINC SULFATE 50(220)MG
220 CAPSULE ORAL DAILY
Status: DISCONTINUED | OUTPATIENT
Start: 2019-09-16 | End: 2019-09-16 | Stop reason: HOSPADM

## 2019-09-16 RX ORDER — AMLODIPINE BESYLATE 10 MG/1
10 TABLET ORAL DAILY
Qty: 30 TABLET | Refills: 11 | Status: SHIPPED | OUTPATIENT
Start: 2019-09-17 | End: 2020-05-21 | Stop reason: SDUPTHER

## 2019-09-16 RX ORDER — ZINC SULFATE 50(220)MG
220 CAPSULE ORAL DAILY
COMMUNITY
Start: 2019-09-17 | End: 2019-10-09

## 2019-09-16 RX ORDER — LOSARTAN POTASSIUM 100 MG/1
100 TABLET ORAL DAILY
Qty: 90 TABLET | Refills: 3 | Status: SHIPPED | OUTPATIENT
Start: 2019-09-17 | End: 2019-11-07 | Stop reason: ALTCHOICE

## 2019-09-16 RX ORDER — GLIPIZIDE 10 MG/1
20 TABLET ORAL
Qty: 60 TABLET | Refills: 2 | Status: SHIPPED | OUTPATIENT
Start: 2019-09-17 | End: 2019-11-21 | Stop reason: DRUGHIGH

## 2019-09-16 RX ORDER — LANOLIN ALCOHOL/MO/W.PET/CERES
800 CREAM (GRAM) TOPICAL ONCE
Status: COMPLETED | OUTPATIENT
Start: 2019-09-16 | End: 2019-09-16

## 2019-09-16 RX ORDER — FUROSEMIDE 20 MG/1
40 TABLET ORAL DAILY
Qty: 60 TABLET | Refills: 11 | Status: SHIPPED | OUTPATIENT
Start: 2019-09-16 | End: 2019-10-09

## 2019-09-16 RX ORDER — OXYCODONE HYDROCHLORIDE 5 MG/1
5 TABLET ORAL EVERY 4 HOURS PRN
Qty: 30 TABLET | Refills: 0 | Status: SHIPPED | OUTPATIENT
Start: 2019-09-16 | End: 2019-09-30

## 2019-09-16 RX ORDER — DICLOFENAC SODIUM 10 MG/G
2 GEL TOPICAL 3 TIMES DAILY
Qty: 1 TUBE | Refills: 0 | Status: SHIPPED | OUTPATIENT
Start: 2019-09-16 | End: 2019-09-30 | Stop reason: SDUPTHER

## 2019-09-16 RX ADMIN — OXYCODONE HYDROCHLORIDE 5 MG: 5 TABLET ORAL at 12:09

## 2019-09-16 RX ADMIN — FAMOTIDINE 20 MG: 20 TABLET, FILM COATED ORAL at 09:09

## 2019-09-16 RX ADMIN — DICLOFENAC SODIUM 2 G: 10 GEL TOPICAL at 09:09

## 2019-09-16 RX ADMIN — ATORVASTATIN CALCIUM 80 MG: 20 TABLET, FILM COATED ORAL at 09:09

## 2019-09-16 RX ADMIN — AMLODIPINE BESYLATE 10 MG: 10 TABLET ORAL at 09:09

## 2019-09-16 RX ADMIN — INSULIN ASPART 2 UNITS: 100 INJECTION, SOLUTION INTRAVENOUS; SUBCUTANEOUS at 09:09

## 2019-09-16 RX ADMIN — ZINC SULFATE 220 MG (50 MG) CAPSULE 220 MG: CAPSULE at 09:09

## 2019-09-16 RX ADMIN — GLIPIZIDE 20 MG: 5 TABLET ORAL at 09:09

## 2019-09-16 RX ADMIN — LOSARTAN POTASSIUM 100 MG: 50 TABLET, FILM COATED ORAL at 09:09

## 2019-09-16 RX ADMIN — METOPROLOL SUCCINATE 100 MG: 50 TABLET, EXTENDED RELEASE ORAL at 09:09

## 2019-09-16 RX ADMIN — Medication 800 MG: at 12:09

## 2019-09-16 RX ADMIN — GABAPENTIN 100 MG: 100 CAPSULE ORAL at 09:09

## 2019-09-16 RX ADMIN — ASPIRIN 325 MG: 325 TABLET, COATED ORAL at 09:09

## 2019-09-16 NOTE — HOSPITAL COURSE
Patient progressed well with PT and OT.  During her stay at SNF, patient experienced intense left shoulder pain. Patient went to outpatient ortho clinic on 09/11.  Ortho decided against giving patient cortisone injection due to recent surgery and E coli bacteremia infection.  Ortho states they will consider administering injection a few weeks.  Patient was instructed to follow up with Ortho as an outpatient at that time.  Patient was sent with p.o. and topical prescriptions for shoulder pain- today, patient states her pain is better controlled. Home health was set up. DME was ordered if needed. Follow up appointment to be made by patient within one week. All prescriptions and discharge instructions were ordered to be given to the patient prior to discharge.       PEx  Constitutional: Patient appears well-developed and in no distress   HENT:   Head: Normocephalic and atraumatic.   Eyes: Pupils are equal, round, and reactive to light.   Neck: Normal range of motion. Neck supple.   Cardiovascular: Normal rate, regular rhythm and normal heart sounds.    Pulmonary/Chest: Effort normal and breath sounds are clear  Abdominal: Soft. Bowel sounds are normal.   Musculoskeletal:  Decreased range of motion to left upper extremity  Neurological: Alert and oriented to person, place, and time.   Psychiatric: Normal mood and affect. Behavior is normal.   Skin: Skin is warm and dry.  Midsternal incision visualized- tension sutures present- surgical glue peeling away, incision fully approximated and without signs of infection.  Sternum stable. 3 old chest tube sites present- scabbed appearance, no drainage or signs of infection.  3 left leg graft sites- fully approximated, without drainage, hard tissue palpated.

## 2019-09-16 NOTE — DISCHARGE SUMMARY
[facility] [place] [facility] [place] [facility]  Discharge Summary      Patient Name: Kalyee Romero  MRN: [id number]  Admission Date: 9/9/2019  Hospital Length of Stay: 7 days  Discharge Date and Time:  09/16/2019 12:56 PM  Attending Physician: Mariaelena Calderon MD   Discharging Provider: Shara Atkins NP  Primary Care Provider: Liz Roberts MD      HPI:   Mrs. Romero is 74 year old female with PMHx of CAD s/p CABG on 8/12/29, HTN, DMT2, CKD III, obesity, & KAMRAN presents to SNF following a hospitalization for E coli bacteremia.  Admission to SNF secondary to continued weakness and debility.      [place] [facility] [place]/p mechanical fall due to weakness, fever, tachycardic, tachypneic, and hypotension secondary to LLE cellulitis infection and DOREEN. Blood cultures from 8/28 found to be positive for E. Coli bacteremia.  She was started on Zosyn IV.      Today patient continues to complains of left shoulder pain that she states is chronic but has worsened from her prior fall.  Pain starts from posterior shoulder and radiates down to her left upper chest. She went to Ortho clinic on 9/10 to request a corticosteroid injection; however, Ortho PA and MD feel that due to her recent surgery and sepsis the injection should be deferred at time. Current pain medications provide minimal relief. She denies associated numbness and tingling, but she has decreased ROM.     Patient will be treated at [facility] with PT and OT to improve functional status and ability to perform ADLs.       Hospital Course:   Patient progressed well with PT and OT.  During her stay at SNF, patient experienced intense left shoulder pain. Patient went to outpatient ortho clinic on 09/11.  Ortho decided against giving patient cortisone injection due to recent surgery and E coli bacteremia infection.  Ortho states they will consider administering injection a few weeks.  Patient was instructed to follow up with Ortho as an  outpatient at that time.  Patient was sent with p.o. and topical prescriptions for shoulder pain- today, patient states her pain is better controlled. Home health was set up. DME was ordered if needed. Follow up appointment to be made by patient within one week. All prescriptions and discharge instructions were ordered to be given to the patient prior to discharge.       PEx  Constitutional: Patient appears well-developed and in no distress   HENT:   Head: Normocephalic and atraumatic.   Eyes: Pupils are equal, round, and reactive to light.   Neck: Normal range of motion. Neck supple.   Cardiovascular: Normal rate, regular rhythm and normal heart sounds.    Pulmonary/Chest: Effort normal and breath sounds are clear  Abdominal: Soft. Bowel sounds are normal.   Musculoskeletal:  Decreased range of motion to left upper extremity  Neurological: Alert and oriented to person, place, and time.   Psychiatric: Normal mood and affect. Behavior is normal.   Skin: Skin is warm and dry.  Midsternal incision visualized- tension sutures present- surgical glue peeling away, incision fully approximated and without signs of infection.  Sternum stable. 3 old chest tube sites present- scabbed appearance, no drainage or signs of infection.  3 left leg graft sites- fully approximated, without drainage, hard tissue palpated.       Consults:   Consults (From admission, onward)        Status Ordering Provider     Inpatient consult to Registered Dietitian/Nutritionist  Once     Provider:  (Not yet assigned)    MAKEDA Arnett new Assessment & Plan notes have been filed under this hospital service since the last note was generated.  Service: Hospital Medicine    Final Active Diagnoses:    Diagnosis Date Noted POA    PRINCIPAL PROBLEM:  E. coli sepsis [A41.51] 09/11/2019 Yes    E coli bacteremia [R78.81] 09/11/2019 Yes    E. coli UTI [N39.0, B96.20] 09/11/2019 Yes    Acute blood loss anemia [D62] 08/14/2019 Yes  "   S/P CABG (coronary artery bypass graft) [Z95.1] 08/12/2019 Not Applicable    Coronary artery disease involving native coronary artery of native heart with angina pectoris [I25.119] 08/04/2019 Yes    Type 2 diabetes mellitus with stage 3 chronic kidney disease and hypertension [E11.22, I12.9, N18.3] 05/16/2019 Yes    Physical deconditioning [R53.81] 11/05/2018 Yes    Morbid obesity with body mass index (BMI) of 40.0 or higher [E66.01] 05/09/2016 Yes    Hyperlipidemia [E78.5] 12/02/2014 Yes      Problems Resolved During this Admission:       Discharged Condition: good    Disposition: Home-Health Care [facility]    Follow Up:  Follow-up Information     Schedule an appointment as soon as possible for a visit with Liz Roberts MD.    Specialty:  Internal Medicine  Why:  WITHIN 1-2 WEEKS  Contact information:  [address]  [facility] [address]  [phone]                 Patient Instructions:      BATH/SHOWER CHAIR FOR HOME USE     Order Specific Question Answer Comments   Height: 5' 5" (1.651 m)    Weight: 101.2 kg (223 lb 1.7 oz)    Does patient have medical equipment at home? cane, straight    Length of need (1-99 months): 99    Type: With back    Vendor: [facility]    Expected Date of Delivery: 9/16/2019      No driving until:   Order Comments: Cleared by PCP     Notify your health care provider if you experience any of the following:  temperature >100.4     Notify your health care provider if you experience any of the following:  persistent nausea and vomiting or diarrhea     Notify your health care provider if you experience any of the following:  severe uncontrolled pain     Notify your health care provider if you experience any of the following:  redness, tenderness, or signs of infection (pain, swelling, redness, odor or green/yellow discharge around incision site)     Notify your health care provider if you experience any of the following:  difficulty breathing or increased cough     Notify " your health care provider if you experience any of the following:  persistent dizziness, light-headedness, or visual disturbances     Notify your health care provider if you experience any of the following:  increased confusion or weakness     Activity as tolerated       Significant Diagnostic Studies: Labs:   BMP:   Recent Labs   Lab 09/16/19  0447   *   *   K 4.5      CO2 20*   BUN 21   CREATININE 1.2   CALCIUM 8.9   MG 1.5*    and CBC   Recent Labs   Lab 09/16/19 0447   WBC 3.80*   HGB 8.4*   HCT 27.7*          Pending Diagnostic Studies:     None         Medications:  Reconciled Home Medications:      Medication List      START taking these medications    amLODIPine 10 MG tablet  Commonly known as:  NORVASC  Take 1 tablet (10 mg total) by mouth once daily.  Start taking on:  9/17/2019     diclofenac sodium 1 % Gel  Commonly known as:  VOLTAREN  Apply 2 g topically 3 (three) times daily.     gabapentin 100 MG capsule  Commonly known as:  NEURONTIN  Take 1 capsule (100 mg total) by mouth 3 (three) times daily.     glipiZIDE 10 MG tablet  Commonly known as:  GLUCOTROL  Take 2 tablets (20 mg total) by mouth daily with breakfast.  Start taking on:  9/17/2019     metoprolol succinate 100 MG 24 hr tablet  Commonly known as:  TOPROL-XL  Take 1 tablet (100 mg total) by mouth once daily.  Start taking on:  9/17/2019     oxyCODONE 5 MG immediate release tablet  Commonly known as:  ROXICODONE  Take 1 tablet (5 mg total) by mouth every 4 (four) hours as needed for Pain.     zinc sulfate 220 (50) mg capsule  Commonly known as:  ZINCATE  Take 1 capsule (220 mg total) by mouth once daily.  Start taking on:  9/17/2019        CHANGE how you take these medications    dulaglutide 1.5 mg/0.5 mL Pnij  Commonly known as:  TRULICITY  Inject 1.5 mg into the skin once a week.  What changed:  Another medication with the same name was removed. Continue taking this medication, and follow the directions you see  "here.     furosemide 20 MG tablet  Commonly known as:  LASIX  Take 2 tablets (40 mg total) by mouth once daily. HOLD until otherwise directed by PCP  What changed:    · additional instructions  · Another medication with the same name was removed. Continue taking this medication, and follow the directions you see here.     insulin detemir U-100 100 unit/mL (3 mL) Inpn pen  Commonly known as:  LEVEMIR FLEXTOUCH  Inject 8 Units into the skin every evening.  What changed:  how much to take     losartan 100 MG tablet  Commonly known as:  COZAAR  Take 1 tablet (100 mg total) by mouth once daily.  Start taking on:  9/17/2019  What changed:    · medication strength  · how much to take        CONTINUE taking these medications    alcohol swabs Padm  Commonly known as:  ALCOHOL PADS  Apply 1 each topically as needed.     alendronate 70 MG tablet  Commonly known as:  FOSAMAX  Take 1 tablet (70 mg total) by mouth every 7 days. Take with a full glass of water & remain upright for at least 30 minutes     aspirin 325 MG EC tablet  Commonly known as:  ECOTRIN  Take 1 tablet (325 mg total) by mouth once daily.     atorvastatin 80 MG tablet  Commonly known as:  LIPITOR  Take 1 tablet (80 mg total) by mouth once daily.     BD ULTRA-FINE SHORT PEN NEEDLE 31 gauge x 5/16" Ndle  Generic drug:  pen needle, diabetic  Use every evening.     blood sugar diagnostic Strp  1 strip by Misc.(Non-Drug; Combo Route) route once daily.     blood-glucose meter kit  Use as instructed     docusate sodium 100 MG capsule  Commonly known as:  COLACE  Take 1 capsule (100 mg total) by mouth 2 (two) times daily.     lancets Misc  1 lancet by Misc.(Non-Drug; Combo Route) route once daily.     ONE DAILY MULTIVITAMIN per tablet  Generic drug:  multivitamin  Take 1 tablet by mouth once daily.        STOP taking these medications    amiodarone 400 MG tablet  Commonly known as:  PACERONE     insulin aspart U-100 100 unit/mL (3 mL) Inpn pen  Commonly known as:  " NovoLOG Flexpen U-100 Insulin     metoprolol tartrate 25 MG tablet  Commonly known as:  LOPRESSOR     potassium chloride SA 20 MEQ tablet  Commonly known as:  K-DUR,KLOR-CON            Indwelling Lines/Drains at time of discharge:   Lines/Drains/Airways          None          Time spent on the discharge of patient: 36 minutes  Patient was seen and examined on the date of discharge and determined to be suitable for discharge.         Shara Atkins NP  Department of Hospital Medicine  [facility] - Skilled Nursing Care

## 2019-09-16 NOTE — NURSING
Patient left the unit for d/c home at approximately 1250 via wc accompanied by a staff member and patients family.

## 2019-09-16 NOTE — PLAN OF CARE
Problem: Adult Inpatient Plan of Care  Goal: Plan of Care Review  Outcome: Ongoing (interventions implemented as appropriate)     09/16/19 0341   Plan of Care Review   Plan of Care Reviewed With patient       Problem: Fall Injury Risk  Goal: Absence of Fall and Fall-Related Injury  Outcome: Ongoing (interventions implemented as appropriate)  Intervention: Identify and Manage Contributors to Fall Injury Risk     09/16/19 0341   Manage Acute Allergic Reaction   Medication Review/Management medications reviewed   Identify and Manage Contributors to Fall Injury Risk   Self-Care Promotion BADL personal routines maintained;BADL personal objects within reach     Intervention: Promote Injury-Free Environment     09/16/19 0341   Optimize [place] and Functional Mobility   Environmental Safety Modification assistive device/personal items within reach;lighting adjusted   Optimize Balance and Safe Activity   Safety Promotion/Fall Prevention assistive device/personal item within reach;Fall Risk reviewed with patient/family;Fall Risk signage in place;lighting adjusted;medications reviewed;in recliner, wheels locked;nonskid shoes/socks when out of bed;upper side rails raised x 2, lower siderails raised x 1 (Peds only);instructed to call staff for mobility

## 2019-09-16 NOTE — PROGRESS NOTES
Patient and family discussed questions they had regarding diabetic diet and food list provided last Friday. Pt had read the materials and had written questions about info she had read. Recommended no more that 60 gms carbohydrate per meal for weight loss post dc.

## 2019-09-16 NOTE — NURSING
Discharge instructions reviewed with patient and patients .  All patients questions were answered.  Patient received a copy of her d/c instructions.

## 2019-09-16 NOTE — HPI
Mrs. Romero is 74 year old female with PMHx of CAD s/p CABG on 8/12/29, HTN, DMT2, CKD III, obesity, & KAMRAN presents to SNF following a hospitalization for E coli bacteremia.  Admission to SNF secondary to continued weakness and debility.      Patient original presented to [facility] [place] on 8/27 s/p mechanical fall due to weakness, fever, tachycardic, tachypneic, and hypotension secondary to LLE cellulitis infection and DOREEN. Blood cultures from 8/28 found to be positive for E. Coli bacteremia.  She was started on Zosyn IV.      Today patient continues to complains of left shoulder pain that she states is chronic but has worsened from her prior fall.  Pain starts from posterior shoulder and radiates down to her left upper chest. She went to Ortho clinic on 9/10 to request a corticosteroid injection; however, Ortho PA and MD feel that due to her recent surgery and sepsis the injection should be deferred at time. Current pain medications provide minimal relief. She denies associated numbness and tingling, but she has decreased ROM.     Patient will be treated at [facility] with PT and OT to improve functional status and ability to perform ADLs.

## 2019-09-17 ENCOUNTER — OFFICE VISIT (OUTPATIENT)
Dept: CARDIOLOGY | Facility: CLINIC | Age: 74
End: 2019-09-17
Payer: MEDICARE

## 2019-09-17 ENCOUNTER — TELEPHONE (OUTPATIENT)
Dept: PAIN MEDICINE | Facility: CLINIC | Age: 74
End: 2019-09-17

## 2019-09-17 VITALS
BODY MASS INDEX: 39.14 KG/M2 | HEIGHT: 63 IN | WEIGHT: 220.88 LBS | DIASTOLIC BLOOD PRESSURE: 81 MMHG | OXYGEN SATURATION: 99 % | HEART RATE: 101 BPM | SYSTOLIC BLOOD PRESSURE: 160 MMHG

## 2019-09-17 DIAGNOSIS — R53.81 PHYSICAL DECONDITIONING: ICD-10-CM

## 2019-09-17 DIAGNOSIS — I12.9 TYPE 2 DIABETES MELLITUS WITH STAGE 3 CHRONIC KIDNEY DISEASE AND HYPERTENSION: ICD-10-CM

## 2019-09-17 DIAGNOSIS — G47.33 OSA ON CPAP: ICD-10-CM

## 2019-09-17 DIAGNOSIS — I10 ESSENTIAL HYPERTENSION: Primary | ICD-10-CM

## 2019-09-17 DIAGNOSIS — G89.29 CHRONIC LEFT SHOULDER PAIN: ICD-10-CM

## 2019-09-17 DIAGNOSIS — E11.22 TYPE 2 DIABETES MELLITUS WITH STAGE 3 CHRONIC KIDNEY DISEASE AND HYPERTENSION: ICD-10-CM

## 2019-09-17 DIAGNOSIS — N18.30 TYPE 2 DIABETES MELLITUS WITH STAGE 3 CHRONIC KIDNEY DISEASE AND HYPERTENSION: ICD-10-CM

## 2019-09-17 DIAGNOSIS — I25.810 CORONARY ARTERY DISEASE INVOLVING CORONARY BYPASS GRAFT OF NATIVE HEART WITHOUT ANGINA PECTORIS: ICD-10-CM

## 2019-09-17 DIAGNOSIS — N18.30 CKD (CHRONIC KIDNEY DISEASE) STAGE 3, GFR 30-59 ML/MIN: ICD-10-CM

## 2019-09-17 DIAGNOSIS — Z95.1 S/P CABG (CORONARY ARTERY BYPASS GRAFT): ICD-10-CM

## 2019-09-17 DIAGNOSIS — E66.01 MORBID OBESITY WITH BODY MASS INDEX (BMI) OF 40.0 OR HIGHER: ICD-10-CM

## 2019-09-17 DIAGNOSIS — M25.512 CHRONIC LEFT SHOULDER PAIN: ICD-10-CM

## 2019-09-17 DIAGNOSIS — E78.2 MIXED HYPERLIPIDEMIA: ICD-10-CM

## 2019-09-17 PROBLEM — A41.51 E. COLI SEPSIS: Status: RESOLVED | Noted: 2019-09-11 | Resolved: 2019-09-17

## 2019-09-17 PROCEDURE — 3077F PR MOST RECENT SYSTOLIC BLOOD PRESSURE >= 140 MM HG: ICD-10-PCS | Mod: CPTII,S$GLB,, | Performed by: INTERNAL MEDICINE

## 2019-09-17 PROCEDURE — 99214 OFFICE O/P EST MOD 30 MIN: CPT | Mod: S$GLB,,, | Performed by: INTERNAL MEDICINE

## 2019-09-17 PROCEDURE — 1101F PR PT FALLS ASSESS DOC 0-1 FALLS W/OUT INJ PAST YR: ICD-10-PCS | Mod: CPTII,S$GLB,, | Performed by: INTERNAL MEDICINE

## 2019-09-17 PROCEDURE — 3045F PR MOST RECENT HEMOGLOBIN A1C LEVEL 7.0-9.0%: CPT | Mod: CPTII,S$GLB,, | Performed by: INTERNAL MEDICINE

## 2019-09-17 PROCEDURE — 99499 RISK ADDL DX/OHS AUDIT: ICD-10-PCS | Mod: S$GLB,,, | Performed by: INTERNAL MEDICINE

## 2019-09-17 PROCEDURE — 1101F PT FALLS ASSESS-DOCD LE1/YR: CPT | Mod: CPTII,S$GLB,, | Performed by: INTERNAL MEDICINE

## 2019-09-17 PROCEDURE — 3045F PR MOST RECENT HEMOGLOBIN A1C LEVEL 7.0-9.0%: ICD-10-PCS | Mod: CPTII,S$GLB,, | Performed by: INTERNAL MEDICINE

## 2019-09-17 PROCEDURE — 99214 PR OFFICE/OUTPT VISIT, EST, LEVL IV, 30-39 MIN: ICD-10-PCS | Mod: S$GLB,,, | Performed by: INTERNAL MEDICINE

## 2019-09-17 PROCEDURE — 99999 PR PBB SHADOW E&M-EST. PATIENT-LVL IV: ICD-10-PCS | Mod: PBBFAC,,, | Performed by: INTERNAL MEDICINE

## 2019-09-17 PROCEDURE — 3079F PR MOST RECENT DIASTOLIC BLOOD PRESSURE 80-89 MM HG: ICD-10-PCS | Mod: CPTII,S$GLB,, | Performed by: INTERNAL MEDICINE

## 2019-09-17 PROCEDURE — 99999 PR PBB SHADOW E&M-EST. PATIENT-LVL IV: CPT | Mod: PBBFAC,,, | Performed by: INTERNAL MEDICINE

## 2019-09-17 PROCEDURE — 3077F SYST BP >= 140 MM HG: CPT | Mod: CPTII,S$GLB,, | Performed by: INTERNAL MEDICINE

## 2019-09-17 PROCEDURE — 99499 UNLISTED E&M SERVICE: CPT | Mod: S$GLB,,, | Performed by: INTERNAL MEDICINE

## 2019-09-17 PROCEDURE — 3079F DIAST BP 80-89 MM HG: CPT | Mod: CPTII,S$GLB,, | Performed by: INTERNAL MEDICINE

## 2019-09-17 NOTE — PROGRESS NOTES
Subjective:    Patient ID:  Kaylee Romero is a 74 y.o. female who presents for follow-up of post CABG.    HPI     The patient is a 74 year old female presented 11/5/18 for evaluation of chest heaviness. She also reported a change in her hand dexterity.She presented to [facility] 7/9/19 with chest pain and a Tn 0.3 and was transferred to [facility]. See [facility] below. She was discharged  and was seen by Dr Ventura in hi clinic and is scheduled for a CABG 8/12/19.She had a KENDY to LAD and SVG to OM. She was discharged to be readmitted 8/27 following a fall and on evaluation was found to have gram negative bacteriemia due to UTI. She subsequently when t o SF to be discharged yesterday. She continues with left shoulder pain from a fall since last September.  Lab Results   Component Value Date     09/17/2019    K 4.0 09/17/2019     09/17/2019    CO2 24 09/17/2019    BUN 15 09/17/2019    CREATININE 0.9 09/17/2019     (H) 09/17/2019    HGBA1C 7.5 (H) 08/07/2019    MG 1.5 (L) 09/16/2019    AST 18 09/16/2019    ALT 20 09/16/2019    ALBUMIN 2.6 (L) 09/16/2019    PROT 7.2 09/16/2019    BILITOT 0.2 09/16/2019    WBC 4.33 09/17/2019    HGB 8.8 (L) 09/17/2019    HCT 29.1 (L) 09/17/2019    HCT 25 (L) 08/12/2019    MCV 90 09/17/2019     09/17/2019    INR 1.1 08/28/2019    TSH 2.307 05/23/2019         Lab Results   Component Value Date    CHOL 134 07/09/2019    HDL 43 07/09/2019    TRIG 101 07/09/2019       Lab Results   Component Value Date    LDLCALC 70.8 07/09/2019       Past Medical History:   Diagnosis Date    Acid reflux     Age-related osteoporosis without current pathological fracture 1/11/2019    Cataract     CKD (chronic kidney disease) stage 3, GFR 30-59 ml/min     Dry mouth     History of TIA (transient ischemic attack) 12/11/2018    Hyperlipidemia 12/2/2014    Hypertensive heart disease with diastolic heart failure 11/28/2012    Morbid obesity with body mass index (BMI) of 40.0 or higher  5/9/2016    KAMRAN (obstructive sleep apnea)     KAMRAN on CPAP 6/28/2016    Osteoporosis without current pathological fracture 11/11/2018    Physical deconditioning 11/5/2018    Status post total left knee replacement 5/1/2017 5/16/2017    Type 2 diabetes mellitus with stage 3 chronic kidney disease, without long-term current use of insulin 5/16/2019       Current Outpatient Medications:     alcohol swabs (ALCOHOL PADS) PadM, Apply 1 each topically as needed., Disp: 11 each, Rfl: 11    alendronate (FOSAMAX) 70 MG tablet, Take 1 tablet (70 mg total) by mouth every 7 days. Take with a full glass of water & remain upright for at least 30 minutes, Disp: 12 tablet, Rfl: 3    amLODIPine (NORVASC) 10 MG tablet, Take 1 tablet (10 mg total) by mouth once daily., Disp: 30 tablet, Rfl: 11    aspirin (ECOTRIN) 325 MG EC tablet, Take 1 tablet (325 mg total) by mouth once daily., Disp: , Rfl: 0    atorvastatin (LIPITOR) 80 MG tablet, Take 1 tablet (80 mg total) by mouth once daily., Disp: 90 tablet, Rfl: 3    blood sugar diagnostic Strp, 1 strip by Misc.(Non-Drug; Combo Route) route once daily., Disp: 100 strip, Rfl: 3    blood-glucose meter kit, Use as instructed, Disp: 1 each, Rfl: 0    diclofenac sodium (VOLTAREN) 1 % Gel, Apply 2 g topically 3 (three) times daily., Disp: 1 Tube, Rfl: 0    dulaglutide (TRULICITY) 1.5 mg/0.5 mL PnIj, Inject 1.5 mg into the skin once a week., Disp: 3 Syringe, Rfl: 3    furosemide (LASIX) 20 MG tablet, Take 2 tablets (40 mg total) by mouth once daily. HOLD until otherwise directed by PCP, Disp: 60 tablet, Rfl: 11    gabapentin (NEURONTIN) 100 MG capsule, Take 1 capsule (100 mg total) by mouth 3 (three) times daily., Disp: 90 capsule, Rfl: 11    glipiZIDE (GLUCOTROL) 10 MG tablet, Take 2 tablets (20 mg total) by mouth daily with breakfast., Disp: 60 tablet, Rfl: 2    insulin detemir U-100 (LEVEMIR FLEXTOUCH) 100 unit/mL (3 mL) SubQ InPn pen, Inject 8 Units into the skin every  "evening., Disp: 7.2 mL, Rfl: 0    lancets Misc, 1 lancet by Misc.(Non-Drug; Combo Route) route once daily., Disp: 100 each, Rfl: 3    losartan (COZAAR) 100 MG tablet, Take 1 tablet (100 mg total) by mouth once daily., Disp: 90 tablet, Rfl: 3    metoprolol succinate (TOPROL-XL) 100 MG 24 hr tablet, Take 1 tablet (100 mg total) by mouth once daily., Disp: 30 tablet, Rfl: 11    multivitamin (ONE DAILY MULTIVITAMIN) per tablet, Take 1 tablet by mouth once daily., Disp: , Rfl:     oxyCODONE (ROXICODONE) 5 MG immediate release tablet, Take 1 tablet (5 mg total) by mouth every 4 (four) hours as needed for Pain., Disp: 30 tablet, Rfl: 0    pen needle, diabetic 31 gauge x 5/16" Ndle, Use every evening., Disp: 100 each, Rfl: 11    zinc sulfate (ZINCATE) 220 (50) mg capsule, Take 1 capsule (220 mg total) by mouth once daily., Disp: , Rfl:           Review of Systems   Constitution: Negative for decreased appetite, diaphoresis, fever, malaise/fatigue, weight gain and weight loss.   HENT: Negative for congestion, ear discharge, ear pain and nosebleeds.    Eyes: Negative for blurred vision, double vision and visual disturbance.   Cardiovascular: Negative for chest pain, claudication, cyanosis, dyspnea on exertion, irregular heartbeat, leg swelling, near-syncope, orthopnea, palpitations, paroxysmal nocturnal dyspnea and syncope.   Respiratory: Negative for cough, hemoptysis, shortness of breath, sleep disturbances due to breathing, snoring, sputum production and wheezing.    Endocrine: Negative for polydipsia, polyphagia and polyuria.   Hematologic/Lymphatic: Negative for adenopathy and bleeding problem. Does not bruise/bleed easily.   Skin: Negative for color change, nail changes, poor wound healing and rash.   Musculoskeletal: Positive for joint pain (left shoulder). Negative for muscle cramps and muscle weakness.   Gastrointestinal: Negative for abdominal pain, anorexia, change in bowel habit, hematochezia, nausea and " "vomiting.   Genitourinary: Negative for dysuria, frequency and hematuria.   Neurological: Negative for brief paralysis, difficulty with concentration, excessive daytime sleepiness, dizziness, focal weakness, headaches, light-headedness, seizures, vertigo and weakness.   Psychiatric/Behavioral: Negative for altered mental status and depression.   Allergic/Immunologic: Negative for persistent infections.        Objective:BP (!) 160/81 (BP Location: Right arm, Patient Position: Sitting, BP Method: Large (Automatic))   Pulse 101   Ht 5' 2.5" (1.588 m)   Wt 100.2 kg (220 lb 14.4 oz)   LMP 01/09/2001 (Approximate)   SpO2 99%   BMI 39.76 kg/m²             Physical Exam   Constitutional: She is oriented to person, place, and time. She appears well-developed and well-nourished.   HENT:   Head: Normocephalic.   Right Ear: External ear normal.   Left Ear: External ear normal.   Nose: Nose normal.   Inspection of lips, teeth and gums normal   Eyes: Pupils are equal, round, and reactive to light. Conjunctivae and EOM are normal. No scleral icterus.   Neck: Normal range of motion. No JVD present. No tracheal deviation present. No thyromegaly present.   Cardiovascular: Normal rate, regular rhythm and intact distal pulses. Exam reveals no gallop and no friction rub.   Murmur heard.   Harsh midsystolic murmur is present with a grade of 2/6 at the upper right sternal border and upper left sternal border.  Pulmonary/Chest: Effort normal and breath sounds normal. No respiratory distress. She has no wheezes. She has no rales. She exhibits no tenderness.       Abdominal: Soft. Bowel sounds are normal. She exhibits no distension. There is no hepatosplenomegaly. There is no tenderness. There is no guarding.   Musculoskeletal: Normal range of motion. She exhibits no edema or tenderness.   Lymphadenopathy:   Palpation of lymph nodes of neck and groin normal   Neurological: She is oriented to person, place, and time. No cranial nerve " deficit. She exhibits normal muscle tone. Coordination normal.   Skin: Skin is warm and dry. No rash noted. No erythema. No pallor.   Palpation of skin normal   Psychiatric: She has a normal mood and affect. Her behavior is normal. Judgment and thought content normal.         Assessment:       1. Essential hypertension    2. Coronary artery disease involving coronary bypass graft of native heart without angina pectoris    3. Mixed hyperlipidemia    4. S/P CABG (coronary artery bypass graft)    5. CKD (chronic kidney disease) stage 3, GFR 30-59 ml/min    6. Morbid obesity with body mass index (BMI) of 40.0 or higher    7. Type 2 diabetes mellitus with stage 3 chronic kidney disease and hypertension    8. Chronic left shoulder pain    9. KAMRAN on CPAP    10. Physical deconditioning         Plan:       Kaylee was seen today for coronary artery disease involving native coronary artery of .    Diagnoses and all orders for this visit:    Essential hypertension    Coronary artery disease involving coronary bypass graft of native heart without angina pectoris  -     Basic metabolic panel; Future; Expected date: 09/17/2019    Mixed hyperlipidemia    S/P CABG (coronary artery bypass graft)    CKD (chronic kidney disease) stage 3, GFR 30-59 ml/min  -     CBC Without Differential; Future; Expected date: 09/17/2019  -     Basic metabolic panel; Future; Expected date: 09/17/2019    Morbid obesity with body mass index (BMI) of 40.0 or higher    Type 2 diabetes mellitus with stage 3 chronic kidney disease and hypertension    Chronic left shoulder pain  -     Ambulatory consult to Pain Clinic    KAMRAN on CPAP    Physical deconditioning

## 2019-09-17 NOTE — TELEPHONE ENCOUNTER
----- Message from Padmini Avila sent at 9/17/2019  2:32 PM CDT -----  Contact: Dr monica Henderson   Name of Who is Calling:Dr monica Henderson       What is the request in detail:Dr monica Henderson is calling to get patient schedule patient just had heart surgery on August 12,2019.Please Contact      [facility]      What Number to Call Back if not in [facility] :[phone] Or [phone]

## 2019-09-18 ENCOUNTER — PATIENT OUTREACH (OUTPATIENT)
Dept: ADMINISTRATIVE | Facility: OTHER | Age: 74
End: 2019-09-18

## 2019-09-20 ENCOUNTER — PATIENT OUTREACH (OUTPATIENT)
Dept: ADMINISTRATIVE | Facility: HOSPITAL | Age: 74
End: 2019-09-20

## 2019-09-20 NOTE — PROGRESS NOTES
Pre-visit chart review completed. Pt eligible/ due for   Shingles Vaccine (2 of 3)    Influenza Vaccine (1)    Foot Exam

## 2019-09-23 ENCOUNTER — PATIENT OUTREACH (OUTPATIENT)
Dept: OTHER | Facility: OTHER | Age: 74
End: 2019-09-23

## 2019-09-23 ENCOUNTER — OFFICE VISIT (OUTPATIENT)
Dept: PAIN MEDICINE | Facility: CLINIC | Age: 74
End: 2019-09-23
Payer: MEDICARE

## 2019-09-23 VITALS
TEMPERATURE: 98 F | SYSTOLIC BLOOD PRESSURE: 142 MMHG | DIASTOLIC BLOOD PRESSURE: 78 MMHG | HEIGHT: 63 IN | WEIGHT: 224.44 LBS | HEART RATE: 97 BPM | RESPIRATION RATE: 18 BRPM | BODY MASS INDEX: 39.77 KG/M2

## 2019-09-23 DIAGNOSIS — M19.012 OSTEOARTHRITIS OF LEFT SHOULDER, UNSPECIFIED OSTEOARTHRITIS TYPE: Primary | ICD-10-CM

## 2019-09-23 DIAGNOSIS — M19.012 ARTHRITIS OF LEFT ACROMIOCLAVICULAR JOINT: ICD-10-CM

## 2019-09-23 DIAGNOSIS — G89.4 CHRONIC PAIN DISORDER: ICD-10-CM

## 2019-09-23 PROCEDURE — 99999 PR PBB SHADOW E&M-EST. PATIENT-LVL IV: CPT | Mod: PBBFAC,,, | Performed by: ANESTHESIOLOGY

## 2019-09-23 PROCEDURE — 3078F PR MOST RECENT DIASTOLIC BLOOD PRESSURE < 80 MM HG: ICD-10-PCS | Mod: CPTII,S$GLB,, | Performed by: ANESTHESIOLOGY

## 2019-09-23 PROCEDURE — 1101F PR PT FALLS ASSESS DOC 0-1 FALLS W/OUT INJ PAST YR: ICD-10-PCS | Mod: CPTII,S$GLB,, | Performed by: ANESTHESIOLOGY

## 2019-09-23 PROCEDURE — 99999 PR PBB SHADOW E&M-EST. PATIENT-LVL IV: ICD-10-PCS | Mod: PBBFAC,,, | Performed by: ANESTHESIOLOGY

## 2019-09-23 PROCEDURE — 99204 OFFICE O/P NEW MOD 45 MIN: CPT | Mod: S$GLB,,, | Performed by: ANESTHESIOLOGY

## 2019-09-23 PROCEDURE — 3078F DIAST BP <80 MM HG: CPT | Mod: CPTII,S$GLB,, | Performed by: ANESTHESIOLOGY

## 2019-09-23 PROCEDURE — 3077F PR MOST RECENT SYSTOLIC BLOOD PRESSURE >= 140 MM HG: ICD-10-PCS | Mod: CPTII,S$GLB,, | Performed by: ANESTHESIOLOGY

## 2019-09-23 PROCEDURE — 99204 PR OFFICE/OUTPT VISIT, NEW, LEVL IV, 45-59 MIN: ICD-10-PCS | Mod: S$GLB,,, | Performed by: ANESTHESIOLOGY

## 2019-09-23 PROCEDURE — 3077F SYST BP >= 140 MM HG: CPT | Mod: CPTII,S$GLB,, | Performed by: ANESTHESIOLOGY

## 2019-09-23 PROCEDURE — 1101F PT FALLS ASSESS-DOCD LE1/YR: CPT | Mod: CPTII,S$GLB,, | Performed by: ANESTHESIOLOGY

## 2019-09-23 RX ORDER — DICLOFENAC SODIUM 10 MG/G
2 GEL TOPICAL 4 TIMES DAILY
Qty: 1 TUBE | Refills: 2 | Status: SHIPPED | OUTPATIENT
Start: 2019-09-23 | End: 2020-05-21

## 2019-09-23 NOTE — PROGRESS NOTES
Chronic Pain - New Consult    Referring Physician: Pop Henderson MD    Chief Complaint:   Chief Complaint   Patient presents with    Shoulder Pain     Left Side    Arm Pain     Left Side    Back Pain     Thoracic        SUBJECTIVE:    Kaylee Romero presents to the clinic for the evaluation of left shoulder pain. The pain started on 9/21/18 ago following a fall down steps at her home and symptoms have been unchanged.The pain is located in the left shoulder joint area and radiates down her left arm to her left hand.  The pain is described as an intermittent, aching and dull pain and is rated as 8/10. The pain is rated with a score of  5/10 on the BEST day and a score of 10/10 on the WORST day.  Symptoms interfere with daily activity and sleeping. The pain is exacerbated by left arm movement.  The pain is mitigated by medication including gabapentin and Ibuprofen as well as BioFreeze cream. She states she has tried Physical Therapy, most recently 4 months ago, which she says provided some relief. She says she has also had shoulder joint injections by her Orthopedic Surgeon which she says provided significant relief for approximately  6 months.     Patient denies significant weight loss, significant motor weakness and loss of sensations.    Physical Therapy/Home Exercise: Yes (in past, not currently)    Pain Disability Index Review:  Last 3 PDI Scores 9/23/2019   Pain Disability Index (PDI) 70       Pain Medications:    - Opioids: Oxycodone 5 mg PRN  - Adjuvant Medications: Neurontin (Gabapentin) 100 mg BID  - Other: Ibuprofen        report:  Reviewed and consistent with medication use as prescribed.    Pain Procedures: Shoulder Joint injections    Imaging:     Xray Left Shoulder (9/10/19)    FINDINGS:  Postoperative changes of sternotomy.    Mild degenerative disease of the acromioclavicular joint.  No evidence of any acute fracture, dislocation, subluxation, chondrocalcinosis, or bony destruction..       Impression       Mild degenerative disease of the acromioclavicular joint.         Past Medical History:   Diagnosis Date    Acid reflux     Age-related osteoporosis without current pathological fracture 2019    Cataract     CKD (chronic kidney disease) stage 3, GFR 30-59 ml/min     Dry mouth     History of TIA (transient ischemic attack) 2018    Hyperlipidemia 2014    Hypertensive heart disease with diastolic heart failure 2012    Morbid obesity with body mass index (BMI) of 40.0 or higher 2016    KAMRAN (obstructive sleep apnea)     KAMRAN on CPAP 2016    Osteoporosis without current pathological fracture 2018    Physical deconditioning 2018    Status post total left knee replacement 2017    Type 2 diabetes mellitus with stage 3 chronic kidney disease, without long-term current use of insulin 2019     Past Surgical History:   Procedure Laterality Date    ankle surgery (l)      APPENDECTOMY       SECTION      CORONARY ARTERY BYPASS GRAFT (CABG)X3 N/A 2019    [facility]    DILATION AND CURETTAGE OF UTERUS      KNEE ARTHROSCOPY W/ DEBRIDEMENT      KNEE SURGERY Left 2017    TKR    Left heart cath Left 2019    Performed by Ronak Gibbons MD at [facility] CATH LAB    REPLACEMENT-KNEE-TOTAL Left 2017    [facility]    SURGICAL PROCUREMENT, VEIN, ENDOSCOPIC Left 2019    [facility]     Social History     Socioeconomic History    Marital status:      Spouse name: Not on file    Number of children: 1    Years of education: Not on file    Highest education level: Not on file   Occupational History    Not on file   Social Needs    Financial resource strain: Not on file    Food insecurity:     Worry: Not on file     Inability: Not on file    Transportation needs:      Medical: Not on file     Non-medical: Not on file   Tobacco Use    Smoking status: Never Smoker    Smokeless tobacco: Never Used   Substance and Sexual Activity    Alcohol use: Yes     Alcohol/week: 0.0 standard drinks     Comment: social drinker    Drug use: No    Sexual activity: Yes     Partners: Male     Birth control/protection: Post-menopausal   Lifestyle    Physical activity:     Days per week: Not on file     Minutes per session: Not on file    Stress: Not on file   Relationships    Social connections:     Talks on phone: Not on file     Gets together: Not on file     Attends [employer] service: Not on file     Active member of club or organization: Not on file     Attends meetings of clubs or organizations: Not on file     Relationship status: Not on file   Other Topics Concern    Not on file   Social History Narrative     with a son living locally.  at [employer], Retired 5/18.     Family History   Problem Relation Age of Onset    Heart disease Mother     Heart failure Mother     Heart disease Father     Stroke Father     Heart failure Father     Diabetes Father     Stroke Paternal Grandfather     No Known Problems Brother     No Known Problems Son     Breast cancer Neg Hx     Colon cancer Neg Hx     Ovarian cancer Neg Hx     Amblyopia Neg Hx     Blindness Neg Hx     Cancer Neg Hx     Cataracts Neg Hx     Glaucoma Neg Hx     Hypertension Neg Hx     Macular degeneration Neg Hx     Retinal detachment Neg Hx     Strabismus Neg Hx     Thyroid disease Neg Hx        Review of patient's allergies indicates:   Allergen Reactions    Bactrim [sulfamethoxazole-trimethoprim] Other (See Comments)     Generic version  Sulfa makes her sick    Keflex [cephalexin] Other (See Comments)     Turns orange       Current Outpatient Medications   Medication Sig    alcohol swabs (ALCOHOL PADS) PadM Apply 1 each topically as needed.    alendronate (FOSAMAX) 70 MG  "tablet Take 1 tablet (70 mg total) by mouth every 7 days. Take with a full glass of water & remain upright for at least 30 minutes    amLODIPine (NORVASC) 10 MG tablet Take 1 tablet (10 mg total) by mouth once daily.    aspirin (ECOTRIN) 325 MG EC tablet Take 1 tablet (325 mg total) by mouth once daily.    atorvastatin (LIPITOR) 80 MG tablet Take 1 tablet (80 mg total) by mouth once daily.    blood sugar diagnostic Strp 1 strip by Misc.(Non-Drug; Combo Route) route once daily.    blood-glucose meter kit Use as instructed    diclofenac sodium (VOLTAREN) 1 % Gel Apply 2 g topically 3 (three) times daily.    diclofenac sodium (VOLTAREN) 1 % Gel Apply 2 g topically 4 (four) times daily.    dulaglutide (TRULICITY) 1.5 mg/0.5 mL PnIj Inject 1.5 mg into the skin once a week.    furosemide (LASIX) 20 MG tablet Take 2 tablets (40 mg total) by mouth once daily. HOLD until otherwise directed by PCP    gabapentin (NEURONTIN) 100 MG capsule Take 1 capsule (100 mg total) by mouth 3 (three) times daily.    glipiZIDE (GLUCOTROL) 10 MG tablet Take 2 tablets (20 mg total) by mouth daily with breakfast.    insulin detemir U-100 (LEVEMIR FLEXTOUCH) 100 unit/mL (3 mL) SubQ InPn pen Inject 8 Units into the skin every evening.    lancets Misc 1 lancet by Misc.(Non-Drug; Combo Route) route once daily.    losartan (COZAAR) 100 MG tablet Take 1 tablet (100 mg total) by mouth once daily.    metoprolol succinate (TOPROL-XL) 100 MG 24 hr tablet Take 1 tablet (100 mg total) by mouth once daily.    multivitamin (ONE DAILY MULTIVITAMIN) per tablet Take 1 tablet by mouth once daily.    oxyCODONE (ROXICODONE) 5 MG immediate release tablet Take 1 tablet (5 mg total) by mouth every 4 (four) hours as needed for Pain.    pen needle, diabetic 31 gauge x 5/16" Ndle Use every evening.    zinc sulfate (ZINCATE) 220 (50) mg capsule Take 1 capsule (220 mg total) by mouth once daily.     No current facility-administered medications for this " "visit.        REVIEW OF SYSTEMS:    GENERAL:  No weight loss, malaise or fevers.  HEENT:  Negative for frequent or significant headaches.  NECK:  Negative for lumps, goiter, pain and significant neck swelling.  RESPIRATORY:  Negative for cough, wheezing or shortness of breath.  CARDIOVASCULAR:  Negative for chest pain, leg swelling or palpitations. Positive for CAD s/p CABG.  GI:  Negative for abdominal discomfort, blood in stools or black stools or change in bowel habits.  MUSCULOSKELETAL:  See HPI.  SKIN:  Negative for lesions, rash, and itching.  PSYCH:  Positive for sleep disturbance  and recent psychosocial stressors.  HEMATOLOGY/LYMPHOLOGY:  Negative for prolonged bleeding, bruising easily or swollen nodes.  NEURO:   No history of headaches, syncope, paralysis, seizures or tremors.  All other reviewed and negative other than HPI.    OBJECTIVE:    BP (!) 142/78   Pulse 97   Temp 98 °F (36.7 °C)   Resp 18   Ht 5' 2.5" (1.588 m)   Wt 101.8 kg (224 lb 6.9 oz)   LMP 01/09/2001 (Approximate)   BMI 40.39 kg/m²     PHYSICAL EXAMINATION:    General appearance: Well appearing, in no acute distress, alert and oriented x3.  Psych:  Mood and affect appropriate.  Skin: Skin color, texture, turgor normal, no rashes or lesions, in both upper and lower body.  Head/face:  Normocephalic, atraumatic. No palpable lymph nodes.  Neck: No pain to palpation over the cervical paraspinous muscles. No pain with neck flexion, extension, or lateral flexion.   Cor: RRR  Pulm: CTA  GI:  Soft and non-tender.  Back: No pain to palpation over the spine or costovertebral angles. Normal range of motion without pain reproduction.  Extremities: Peripheral joint ROM is full and pain free without obvious instability or laxity in RUE and BLE. Positive decreased ROM of left upper extremity with pain with elevation above 45 degrees as well as pain to palpation of the acromioclavicular joint and glenohumeral joint. No deformities, edema, or skin " discoloration. Good capillary refill.  Musculoskeletal: Bilateral upper and lower extremity strength is normal and symmetric.  No atrophy or tone abnormalities are noted.  Neuro: Bilateral upper and lower extremity coordination and muscle stretch reflexes are physiologic and symmetric.  Plantar response are downgoing. No loss of sensation is noted.  Gait: Antalgic, in wheelchair    ASSESSMENT: 74 y.o. year old female with left shoulder pain consistent with below diagnosis. Per patient, her pain began after a fall in September 2018 and has been consistent. She states her most significant relief was attained following left shoulder joint injections (approximatly 6 months). Repeat injection offered, however pt would perfer medication management and physical therapy at this time as she is concerned that she may weaken her immune system following her CABG procedure in August. Agreed to continue with NSAIDs and order Physical Therapy at this time and will address further interventions at follow-up.     1. Osteoarthritis of left shoulder, unspecified osteoarthritis type  Ambulatory consult to Physical Therapy    diclofenac sodium (VOLTAREN) 1 % Gel   2. Arthritis of left acromioclavicular joint  diclofenac sodium (VOLTAREN) 1 % Gel   3. Chronic pain disorder  diclofenac sodium (VOLTAREN) 1 % Gel         PLAN:     - Repeat left shoulder joint injection offered today, however, patient states she wishes to continue with medication management and physical therapy as she is concerned for compromising her immune system following her CABG. She agreed that we would address further interventions at her follow-up appointment.     - I have stressed the importance of physical activity and a home exercise plan to help with pain and improve health.    - Referral to Physical therapy for joint stabilization, strengthening, and a home exercise program.    - Counseled patient regarding the importance of activity modification, constant  sleeping habits and physical therapy.    - Continue gabapentin 100 mg. Will increase from BID to TID dosing.     - Will order Voltaren gel qid PRN to assist with musculoskeletal pain.    - Also encouraged Tylenol 500 mg BID PRN.     - I do not feel this patient is a candidate for long term opioids for this non-cancer pain at this time.    - RTC in 4 weeks.    The above plan and management options were discussed at length with patient. Patient is in agreement with the above and verbalized understanding. It will be communicated with the referring physician via electronic record, fax, or mail.      Percy Ibarra MD  [facility], PGY V  09/23/2019      I have reviewed and concur with the resident's history, physical, assessment, and plan.  I have personally interviewed and examined the patient at bedside.  See below addendum for my evaluation and additional findings. 74 year old female with chronic shoulder pain consistent with osteoarthritis. She reports significant relief from intra-articular steroid injection in the past. Considering the fact that she recently had CABG and is worried about immunosuppression, we will start with medication management and PT ( as tolerated) and consider intra-articular injection in the future. We will follow up in 4 weeks.    Keyshawn De La Rosa MD  09/23/2019

## 2019-09-23 NOTE — LETTER
September 23, 2019      Pop Henderson MD  [address]  [facility] [address]           [facility] [place] [facility] [address]  [address]  [place] [address]  Phone: [phone]  Fax: [phone]          Patient: Kaylee Romero   MR Number: [id number]   YOB: 1945   Date of Visit: 9/23/2019       Dear Dr. Pop Henderson:    Thank you for referring Kaylee Romero to me for evaluation. Attached you will find relevant portions of my assessment and plan of care.    If you have questions, please do not hesitate to call me. I look forward to following Kaylee Romero along with you.    Sincerely,    Keyshawn De La Rosa MD    Enclosure  CC:  No Recipients    If you would like to receive this communication electronically, please contact [email or web address] or [phone] to request more information on [employer] access.    For providers and/or their staff who would like to refer a patient to [facility], please contact us through our one-stop-shop provider referral line, [facility], at [phone].    If you feel you have received this communication in error or would no longer like to receive these types of communications, please e-mail [email or web address]

## 2019-09-23 NOTE — PROGRESS NOTES
"Digital Medicine: Health  Follow-Up    Brief encounter due to patient just getting home from being discharged from hospital.  States she had a heart attack in July and had open heart surgery in August and then "relapsed" 2 weeks ago.  States she is resting at home.    The history is provided by the patient.     Follow Up  Follow-up reason(s): routine education    States she is going to rehab and plans to go to pain management for her shoulder.      Assessment/Plan    SDOH    INTERVENTION(S)  encouragement/support    Encouraged patient to rest and recover from surgery.      There are no preventive care reminders to display for this patient.    Last 5 Patient Entered Readings                                      Current 30 Day Average: 132/80     Recent Readings 9/22/2019 9/20/2019 9/19/2019 8/22/2019 8/11/2019    SBP (mmHg) 133 129 135 109 129    DBP (mmHg) 81 71 89 59 84    Pulse 84 90 94 84 93        Last 6 Patient Entered Readings                                          Most Recent A1c: 7.5% on 8/7/2019  (Goal: 8%)     Recent Readings 9/22/2019 9/20/2019 9/19/2019 9/19/2019 8/26/2019    Blood Glucose (mg/dL) 209 193 272 216 230              "

## 2019-09-24 ENCOUNTER — TELEPHONE (OUTPATIENT)
Dept: PAIN MEDICINE | Facility: CLINIC | Age: 74
End: 2019-09-24

## 2019-09-24 NOTE — TELEPHONE ENCOUNTER
Spoke with patient regarding increasing Tylenol to 1000mg 3 times daily, okay per Dr De La Rosa but informed patient that she has count acetaminophen in all medication she is taking to make up the 3000mg

## 2019-09-24 NOTE — TELEPHONE ENCOUNTER
----- Message from Piero Abdul sent at 9/24/2019 12:39 PM CDT -----  Contact: Pt   Name of Who is Calling: GERARDO HOLM [[id number]]    What is the request in detail:GERARDO HOLM [[id number]] is requesting a call back  regards to medication she is taking Pt needs her t  Please contact to further discuss and advise      Can the clinic reply by [facility]:  No    What Number to Call Back if not in [facility]:  [phone]

## 2019-09-30 ENCOUNTER — LAB VISIT (OUTPATIENT)
Dept: LAB | Facility: HOSPITAL | Age: 74
End: 2019-09-30
Attending: INTERNAL MEDICINE
Payer: MEDICARE

## 2019-09-30 ENCOUNTER — OFFICE VISIT (OUTPATIENT)
Dept: PRIMARY CARE CLINIC | Facility: CLINIC | Age: 74
End: 2019-09-30
Payer: MEDICARE

## 2019-09-30 VITALS
HEART RATE: 93 BPM | TEMPERATURE: 98 F | WEIGHT: 226.19 LBS | BODY MASS INDEX: 40.08 KG/M2 | SYSTOLIC BLOOD PRESSURE: 136 MMHG | DIASTOLIC BLOOD PRESSURE: 72 MMHG | HEIGHT: 63 IN

## 2019-09-30 DIAGNOSIS — I25.10 CORONARY ARTERY DISEASE INVOLVING NATIVE CORONARY ARTERY OF NATIVE HEART WITHOUT ANGINA PECTORIS: ICD-10-CM

## 2019-09-30 DIAGNOSIS — Z95.1 S/P CABG (CORONARY ARTERY BYPASS GRAFT): ICD-10-CM

## 2019-09-30 DIAGNOSIS — I11.9 HYPERTENSIVE HEART DISEASE WITHOUT HEART FAILURE: ICD-10-CM

## 2019-09-30 DIAGNOSIS — B96.20 E COLI BACTEREMIA: ICD-10-CM

## 2019-09-30 DIAGNOSIS — N18.30 TYPE 2 DIABETES MELLITUS WITH STAGE 3 CHRONIC KIDNEY DISEASE, WITH LONG-TERM CURRENT USE OF INSULIN: Primary | ICD-10-CM

## 2019-09-30 DIAGNOSIS — D64.9 NORMOCYTIC ANEMIA: ICD-10-CM

## 2019-09-30 DIAGNOSIS — Z23 NEED FOR PNEUMOCOCCAL VACCINE: ICD-10-CM

## 2019-09-30 DIAGNOSIS — R78.81 E COLI BACTEREMIA: ICD-10-CM

## 2019-09-30 DIAGNOSIS — Z79.4 TYPE 2 DIABETES MELLITUS WITH STAGE 3 CHRONIC KIDNEY DISEASE, WITH LONG-TERM CURRENT USE OF INSULIN: Primary | ICD-10-CM

## 2019-09-30 DIAGNOSIS — E11.22 TYPE 2 DIABETES MELLITUS WITH STAGE 3 CHRONIC KIDNEY DISEASE, WITH LONG-TERM CURRENT USE OF INSULIN: Primary | ICD-10-CM

## 2019-09-30 LAB
BASOPHILS # BLD AUTO: 0.03 K/UL (ref 0–0.2)
BASOPHILS NFR BLD: 0.5 % (ref 0–1.9)
DIFFERENTIAL METHOD: ABNORMAL
EOSINOPHIL # BLD AUTO: 0.1 K/UL (ref 0–0.5)
EOSINOPHIL NFR BLD: 2.2 % (ref 0–8)
ERYTHROCYTE [DISTWIDTH] IN BLOOD BY AUTOMATED COUNT: 16.4 % (ref 11.5–14.5)
FERRITIN SERPL-MCNC: 127 NG/ML (ref 20–300)
HCT VFR BLD AUTO: 31.7 % (ref 37–48.5)
HGB BLD-MCNC: 9.5 G/DL (ref 12–16)
IMM GRANULOCYTES # BLD AUTO: 0.03 K/UL (ref 0–0.04)
IMM GRANULOCYTES NFR BLD AUTO: 0.5 % (ref 0–0.5)
IRON SERPL-MCNC: 26 UG/DL (ref 30–160)
LYMPHOCYTES # BLD AUTO: 1.2 K/UL (ref 1–4.8)
LYMPHOCYTES NFR BLD: 18.4 % (ref 18–48)
MCH RBC QN AUTO: 27.1 PG (ref 27–31)
MCHC RBC AUTO-ENTMCNC: 30 G/DL (ref 32–36)
MCV RBC AUTO: 91 FL (ref 82–98)
MONOCYTES # BLD AUTO: 0.5 K/UL (ref 0.3–1)
MONOCYTES NFR BLD: 8 % (ref 4–15)
NEUTROPHILS # BLD AUTO: 4.4 K/UL (ref 1.8–7.7)
NEUTROPHILS NFR BLD: 70.4 % (ref 38–73)
NRBC BLD-RTO: 0 /100 WBC
PLATELET # BLD AUTO: 271 K/UL (ref 150–350)
PMV BLD AUTO: 10.5 FL (ref 9.2–12.9)
RBC # BLD AUTO: 3.5 M/UL (ref 4–5.4)
RETICS/RBC NFR AUTO: 2.5 % (ref 0.5–2.5)
SATURATED IRON: 7 % (ref 20–50)
TOTAL IRON BINDING CAPACITY: 352 UG/DL (ref 250–450)
TRANSFERRIN SERPL-MCNC: 238 MG/DL (ref 200–375)
WBC # BLD AUTO: 6.24 K/UL (ref 3.9–12.7)

## 2019-09-30 PROCEDURE — G0009 PNEUMOCOCCAL CONJUGATE VACCINE 13-VALENT LESS THAN 5YO & GREATER THAN: ICD-10-PCS | Mod: S$GLB,,, | Performed by: INTERNAL MEDICINE

## 2019-09-30 PROCEDURE — 82728 ASSAY OF FERRITIN: CPT

## 2019-09-30 PROCEDURE — 85025 COMPLETE CBC W/AUTO DIFF WBC: CPT

## 2019-09-30 PROCEDURE — 83540 ASSAY OF IRON: CPT

## 2019-09-30 PROCEDURE — 99499 UNLISTED E&M SERVICE: CPT | Mod: S$GLB,,, | Performed by: INTERNAL MEDICINE

## 2019-09-30 PROCEDURE — 3078F DIAST BP <80 MM HG: CPT | Mod: CPTII,S$GLB,, | Performed by: INTERNAL MEDICINE

## 2019-09-30 PROCEDURE — 3288F FALL RISK ASSESSMENT DOCD: CPT | Mod: CPTII,S$GLB,, | Performed by: INTERNAL MEDICINE

## 2019-09-30 PROCEDURE — 99499 RISK ADDL DX/OHS AUDIT: ICD-10-PCS | Mod: S$GLB,,, | Performed by: INTERNAL MEDICINE

## 2019-09-30 PROCEDURE — 99214 PR OFFICE/OUTPT VISIT, EST, LEVL IV, 30-39 MIN: ICD-10-PCS | Mod: 25,S$GLB,, | Performed by: INTERNAL MEDICINE

## 2019-09-30 PROCEDURE — 3288F PR FALLS RISK ASSESSMENT DOCUMENTED: ICD-10-PCS | Mod: CPTII,S$GLB,, | Performed by: INTERNAL MEDICINE

## 2019-09-30 PROCEDURE — 90670 PCV13 VACCINE IM: CPT | Mod: S$GLB,,, | Performed by: INTERNAL MEDICINE

## 2019-09-30 PROCEDURE — 99214 OFFICE O/P EST MOD 30 MIN: CPT | Mod: 25,S$GLB,, | Performed by: INTERNAL MEDICINE

## 2019-09-30 PROCEDURE — 1100F PTFALLS ASSESS-DOCD GE2>/YR: CPT | Mod: CPTII,S$GLB,, | Performed by: INTERNAL MEDICINE

## 2019-09-30 PROCEDURE — 90670 PNEUMOCOCCAL CONJUGATE VACCINE 13-VALENT LESS THAN 5YO & GREATER THAN: ICD-10-PCS | Mod: S$GLB,,, | Performed by: INTERNAL MEDICINE

## 2019-09-30 PROCEDURE — G0009 ADMIN PNEUMOCOCCAL VACCINE: HCPCS | Mod: S$GLB,,, | Performed by: INTERNAL MEDICINE

## 2019-09-30 PROCEDURE — 99999 PR PBB SHADOW E&M-EST. PATIENT-LVL III: ICD-10-PCS | Mod: PBBFAC,,, | Performed by: INTERNAL MEDICINE

## 2019-09-30 PROCEDURE — 3078F PR MOST RECENT DIASTOLIC BLOOD PRESSURE < 80 MM HG: ICD-10-PCS | Mod: CPTII,S$GLB,, | Performed by: INTERNAL MEDICINE

## 2019-09-30 PROCEDURE — 36415 COLL VENOUS BLD VENIPUNCTURE: CPT | Mod: PN

## 2019-09-30 PROCEDURE — 85045 AUTOMATED RETICULOCYTE COUNT: CPT

## 2019-09-30 PROCEDURE — 1100F PR PT FALLS ASSESS DOC 2+ FALLS/FALL W/INJURY/YR: ICD-10-PCS | Mod: CPTII,S$GLB,, | Performed by: INTERNAL MEDICINE

## 2019-09-30 PROCEDURE — 3075F SYST BP GE 130 - 139MM HG: CPT | Mod: CPTII,S$GLB,, | Performed by: INTERNAL MEDICINE

## 2019-09-30 PROCEDURE — 3045F PR MOST RECENT HEMOGLOBIN A1C LEVEL 7.0-9.0%: ICD-10-PCS | Mod: CPTII,S$GLB,, | Performed by: INTERNAL MEDICINE

## 2019-09-30 PROCEDURE — 99999 PR PBB SHADOW E&M-EST. PATIENT-LVL III: CPT | Mod: PBBFAC,,, | Performed by: INTERNAL MEDICINE

## 2019-09-30 PROCEDURE — 3075F PR MOST RECENT SYSTOLIC BLOOD PRESS GE 130-139MM HG: ICD-10-PCS | Mod: CPTII,S$GLB,, | Performed by: INTERNAL MEDICINE

## 2019-09-30 PROCEDURE — 3045F PR MOST RECENT HEMOGLOBIN A1C LEVEL 7.0-9.0%: CPT | Mod: CPTII,S$GLB,, | Performed by: INTERNAL MEDICINE

## 2019-09-30 NOTE — PROGRESS NOTES
Two patient identifiers verified.  Allergies reviewed.  Prevnar 13 IM administered to right deltoid per MD order.  Patient tolerated injection well; no redness, bleeding, or bruising noted to injection site.  Patient instructed to remain in clinic setting for 15 minutes.  Verbalizes understanding.
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
## 2019-10-02 ENCOUNTER — TELEPHONE (OUTPATIENT)
Dept: CARDIOTHORACIC SURGERY | Facility: CLINIC | Age: 74
End: 2019-10-02

## 2019-10-02 ENCOUNTER — PATIENT MESSAGE (OUTPATIENT)
Dept: PRIMARY CARE CLINIC | Facility: CLINIC | Age: 74
End: 2019-10-02

## 2019-10-02 ENCOUNTER — TELEPHONE (OUTPATIENT)
Dept: ENDOCRINOLOGY | Facility: CLINIC | Age: 74
End: 2019-10-02

## 2019-10-02 DIAGNOSIS — D64.9 NORMOCYTIC ANEMIA: Primary | ICD-10-CM

## 2019-10-02 RX ORDER — FERROUS SULFATE 325(65) MG
325 TABLET ORAL DAILY
Qty: 90 TABLET | Refills: 0 | Status: SHIPPED | OUTPATIENT
Start: 2019-10-02 | End: 2019-12-23 | Stop reason: SDUPTHER

## 2019-10-02 NOTE — TELEPHONE ENCOUNTER
Anemia is slowly improving after surgery, I ordered an Iron supplement at [facility] to help rebuild blood count, take one tablet daily.

## 2019-10-02 NOTE — TELEPHONE ENCOUNTER
Contacted pt at 12:15 regarding appt that was for 12. Pt stated she forgot and needed to be rescheduled.Pt asked for next week.I told pt I will get back to her on date and time of rescheduled appt.pt verbalized understanding.

## 2019-10-02 NOTE — TELEPHONE ENCOUNTER
Contacted pt with rescheduled date and time.Also informed pt that per Keila ,NP stitches will be taken care of on next visit. Pt verbalized understanding.       ----- Message from Winston Wood sent at 10/2/2019  1:51 PM CDT -----  Contact: Pt  Type:  Needs Medical Advice    Who Called: The Pt states that she thought her appt was for tomorrow and she missed them for today.  Please contact the Pt, the home health care nurse would like to take the stiches  out from the Pt's surgery.  If the doctor can't get the Pt in to remove the stiches, and would like to order [facility] to go out tomorrow to do it.  The Home care nurse states that Dr. Ventura just needs to put the order in.  Please contact the Pt to discuss.    Best Call Back Number: [phone]    Additional Info: The Pt apologizes for missing her appts today.

## 2019-10-04 ENCOUNTER — TELEPHONE (OUTPATIENT)
Dept: HOME HEALTH SERVICES | Facility: HOSPITAL | Age: 74
End: 2019-10-04

## 2019-10-09 ENCOUNTER — HOSPITAL ENCOUNTER (OUTPATIENT)
Dept: CARDIOLOGY | Facility: CLINIC | Age: 74
Discharge: HOME | End: 2019-10-09
Payer: MEDICARE

## 2019-10-09 ENCOUNTER — OFFICE VISIT (OUTPATIENT)
Dept: CARDIOTHORACIC SURGERY | Facility: CLINIC | Age: 74
End: 2019-10-09
Payer: MEDICARE

## 2019-10-09 ENCOUNTER — HOSPITAL ENCOUNTER (OUTPATIENT)
Dept: RADIOLOGY | Facility: HOSPITAL | Age: 74
Discharge: HOME | End: 2019-10-09
Attending: THORACIC SURGERY (CARDIOTHORACIC VASCULAR SURGERY)
Payer: MEDICARE

## 2019-10-09 VITALS — SYSTOLIC BLOOD PRESSURE: 147 MMHG | DIASTOLIC BLOOD PRESSURE: 82 MMHG | OXYGEN SATURATION: 100 % | HEART RATE: 86 BPM

## 2019-10-09 DIAGNOSIS — Z95.1 S/P CABG (CORONARY ARTERY BYPASS GRAFT): Primary | ICD-10-CM

## 2019-10-09 DIAGNOSIS — I25.110 CORONARY ARTERY DISEASE INVOLVING NATIVE CORONARY ARTERY OF NATIVE HEART WITH UNSTABLE ANGINA PECTORIS: ICD-10-CM

## 2019-10-09 PROCEDURE — 99999 PR PBB SHADOW E&M-EST. PATIENT-LVL IV: CPT | Mod: PBBFAC,,, | Performed by: THORACIC SURGERY (CARDIOTHORACIC VASCULAR SURGERY)

## 2019-10-09 PROCEDURE — 99024 POSTOP FOLLOW-UP VISIT: CPT | Mod: S$GLB,,, | Performed by: THORACIC SURGERY (CARDIOTHORACIC VASCULAR SURGERY)

## 2019-10-09 PROCEDURE — 99999 PR PBB SHADOW E&M-EST. PATIENT-LVL IV: ICD-10-PCS | Mod: PBBFAC,,, | Performed by: THORACIC SURGERY (CARDIOTHORACIC VASCULAR SURGERY)

## 2019-10-09 PROCEDURE — 99024 PR POST-OP FOLLOW-UP VISIT: ICD-10-PCS | Mod: S$GLB,,, | Performed by: THORACIC SURGERY (CARDIOTHORACIC VASCULAR SURGERY)

## 2019-10-09 PROCEDURE — 93005 ELECTROCARDIOGRAM TRACING: CPT | Mod: S$GLB,,, | Performed by: THORACIC SURGERY (CARDIOTHORACIC VASCULAR SURGERY)

## 2019-10-09 PROCEDURE — 71046 X-RAY EXAM CHEST 2 VIEWS: CPT | Mod: 26,,, | Performed by: RADIOLOGY

## 2019-10-09 PROCEDURE — 93010 ELECTROCARDIOGRAM REPORT: CPT | Mod: S$GLB,,, | Performed by: INTERNAL MEDICINE

## 2019-10-09 PROCEDURE — 93005 EKG 12-LEAD: ICD-10-PCS | Mod: S$GLB,,, | Performed by: THORACIC SURGERY (CARDIOTHORACIC VASCULAR SURGERY)

## 2019-10-09 PROCEDURE — 71046 XR CHEST PA AND LATERAL: ICD-10-PCS | Mod: 26,,, | Performed by: RADIOLOGY

## 2019-10-09 PROCEDURE — 71046 X-RAY EXAM CHEST 2 VIEWS: CPT | Mod: TC,FY

## 2019-10-09 PROCEDURE — 93010 EKG 12-LEAD: ICD-10-PCS | Mod: S$GLB,,, | Performed by: INTERNAL MEDICINE

## 2019-10-09 NOTE — PROGRESS NOTES
Patient seen and examined. Patient is progressively increasing activity. No significant complaints.     Sternum: stable, incision CDI  Chest xray: Acceptable post op chest  EKG: NSR     Assessment:  S/P CABG     Plan:  Can begin driving   Can begin cardiac rehab 6 weeks after operation   We will refer to cardiology to assume care       No scheduled appointment, RTC prn

## 2019-10-10 ENCOUNTER — TELEPHONE (OUTPATIENT)
Dept: CARDIOTHORACIC SURGERY | Facility: CLINIC | Age: 74
End: 2019-10-10

## 2019-10-10 ENCOUNTER — DOCUMENTATION ONLY (OUTPATIENT)
Dept: CARDIOTHORACIC SURGERY | Facility: CLINIC | Age: 74
End: 2019-10-10

## 2019-10-10 DIAGNOSIS — Z95.1 S/P CABG (CORONARY ARTERY BYPASS GRAFT): Primary | ICD-10-CM

## 2019-10-10 NOTE — PROGRESS NOTES
Pt here for post-op appointment. Reminded patient to keep incision clean and dry, and to adhere to the 5 lb weight restriction for 6 wks post-surgery, then 20lb restriction for 6 additional wks. Patient verbalized understanding. Patient cleared to drive. Patient already have appt set up with Cardiologist. Also, patient made aware that Phase II Cardiac Rehab orders will be placed (CR not [facility], [facility] with [facility]), and the [facility] will reach out to them.

## 2019-10-15 ENCOUNTER — PATIENT OUTREACH (OUTPATIENT)
Dept: ADMINISTRATIVE | Facility: OTHER | Age: 74
End: 2019-10-15

## 2019-10-17 ENCOUNTER — OFFICE VISIT (OUTPATIENT)
Dept: CARDIOLOGY | Facility: CLINIC | Age: 74
End: 2019-10-17
Payer: MEDICARE

## 2019-10-17 VITALS
DIASTOLIC BLOOD PRESSURE: 78 MMHG | HEIGHT: 62 IN | HEART RATE: 85 BPM | WEIGHT: 224.19 LBS | SYSTOLIC BLOOD PRESSURE: 135 MMHG | BODY MASS INDEX: 41.26 KG/M2

## 2019-10-17 DIAGNOSIS — E11.8 TYPE 2 DIABETES MELLITUS WITH COMPLICATION, WITHOUT LONG-TERM CURRENT USE OF INSULIN: ICD-10-CM

## 2019-10-17 DIAGNOSIS — E78.2 MIXED HYPERLIPIDEMIA: ICD-10-CM

## 2019-10-17 DIAGNOSIS — N18.30 CKD (CHRONIC KIDNEY DISEASE) STAGE 3, GFR 30-59 ML/MIN: ICD-10-CM

## 2019-10-17 DIAGNOSIS — E66.01 MORBID OBESITY WITH BODY MASS INDEX (BMI) OF 40.0 OR HIGHER: ICD-10-CM

## 2019-10-17 DIAGNOSIS — I25.810 CORONARY ARTERY DISEASE INVOLVING CORONARY BYPASS GRAFT OF NATIVE HEART WITHOUT ANGINA PECTORIS: Primary | ICD-10-CM

## 2019-10-17 DIAGNOSIS — Z95.1 S/P CABG (CORONARY ARTERY BYPASS GRAFT): ICD-10-CM

## 2019-10-17 DIAGNOSIS — I10 ESSENTIAL HYPERTENSION: ICD-10-CM

## 2019-10-17 DIAGNOSIS — G47.33 OSA ON CPAP: ICD-10-CM

## 2019-10-17 PROCEDURE — 1101F PT FALLS ASSESS-DOCD LE1/YR: CPT | Mod: CPTII,S$GLB,, | Performed by: INTERNAL MEDICINE

## 2019-10-17 PROCEDURE — 99999 PR PBB SHADOW E&M-EST. PATIENT-LVL III: ICD-10-PCS | Mod: PBBFAC,,, | Performed by: INTERNAL MEDICINE

## 2019-10-17 PROCEDURE — 99999 PR PBB SHADOW E&M-EST. PATIENT-LVL III: CPT | Mod: PBBFAC,,, | Performed by: INTERNAL MEDICINE

## 2019-10-17 PROCEDURE — 3075F SYST BP GE 130 - 139MM HG: CPT | Mod: CPTII,S$GLB,, | Performed by: INTERNAL MEDICINE

## 2019-10-17 PROCEDURE — 3078F PR MOST RECENT DIASTOLIC BLOOD PRESSURE < 80 MM HG: ICD-10-PCS | Mod: CPTII,S$GLB,, | Performed by: INTERNAL MEDICINE

## 2019-10-17 PROCEDURE — 99214 OFFICE O/P EST MOD 30 MIN: CPT | Mod: S$GLB,,, | Performed by: INTERNAL MEDICINE

## 2019-10-17 PROCEDURE — 99214 PR OFFICE/OUTPT VISIT, EST, LEVL IV, 30-39 MIN: ICD-10-PCS | Mod: S$GLB,,, | Performed by: INTERNAL MEDICINE

## 2019-10-17 PROCEDURE — 3078F DIAST BP <80 MM HG: CPT | Mod: CPTII,S$GLB,, | Performed by: INTERNAL MEDICINE

## 2019-10-17 PROCEDURE — 3075F PR MOST RECENT SYSTOLIC BLOOD PRESS GE 130-139MM HG: ICD-10-PCS | Mod: CPTII,S$GLB,, | Performed by: INTERNAL MEDICINE

## 2019-10-17 PROCEDURE — 1101F PR PT FALLS ASSESS DOC 0-1 FALLS W/OUT INJ PAST YR: ICD-10-PCS | Mod: CPTII,S$GLB,, | Performed by: INTERNAL MEDICINE

## 2019-10-21 ENCOUNTER — OFFICE VISIT (OUTPATIENT)
Dept: PODIATRY | Facility: CLINIC | Age: 74
End: 2019-10-21
Payer: MEDICARE

## 2019-10-21 ENCOUNTER — PATIENT OUTREACH (OUTPATIENT)
Dept: ADMINISTRATIVE | Facility: OTHER | Age: 74
End: 2019-10-21

## 2019-10-21 VITALS — DIASTOLIC BLOOD PRESSURE: 73 MMHG | HEART RATE: 84 BPM | SYSTOLIC BLOOD PRESSURE: 158 MMHG

## 2019-10-21 DIAGNOSIS — E11.49 TYPE II DIABETES MELLITUS WITH NEUROLOGICAL MANIFESTATIONS: Primary | ICD-10-CM

## 2019-10-21 DIAGNOSIS — B35.1 DERMATOPHYTOSIS OF NAIL: ICD-10-CM

## 2019-10-21 DIAGNOSIS — L84 CORN OR CALLUS: ICD-10-CM

## 2019-10-21 PROCEDURE — 99499 UNLISTED E&M SERVICE: CPT | Mod: S$GLB,,, | Performed by: PODIATRIST

## 2019-10-21 PROCEDURE — 99999 PR PBB SHADOW E&M-EST. PATIENT-LVL III: CPT | Mod: PBBFAC,,, | Performed by: PODIATRIST

## 2019-10-21 PROCEDURE — 11057 PARNG/CUTG B9 HYPRKR LES >4: CPT | Mod: Q9,S$GLB,, | Performed by: PODIATRIST

## 2019-10-21 PROCEDURE — 11721 DEBRIDE NAIL 6 OR MORE: CPT | Mod: 59,Q9,S$GLB, | Performed by: PODIATRIST

## 2019-10-21 PROCEDURE — 11721 ROUTINE FOOT CARE: ICD-10-PCS | Mod: 59,Q9,S$GLB, | Performed by: PODIATRIST

## 2019-10-21 PROCEDURE — 11057 ROUTINE FOOT CARE: ICD-10-PCS | Mod: Q9,S$GLB,, | Performed by: PODIATRIST

## 2019-10-21 PROCEDURE — 99999 PR PBB SHADOW E&M-EST. PATIENT-LVL III: ICD-10-PCS | Mod: PBBFAC,,, | Performed by: PODIATRIST

## 2019-10-21 PROCEDURE — 99499 NO LOS: ICD-10-PCS | Mod: S$GLB,,, | Performed by: PODIATRIST

## 2019-10-21 NOTE — PROGRESS NOTES
Subjective:       Patient ID: Kaylee Romero is a 74 y.o. female.    Chief Complaint: Diabetes Mellitus (PCP Liz Roberts last seen 5/16/19)        Kaylee Romero is a 74 y.o. female    has a past medical history of Acid reflux, Age-related osteoporosis without current pathological fracture, Cataract, CKD (chronic kidney disease) stage 3, GFR 30-59 ml/min, Dry mouth, History of TIA (transient ischemic attack), Hyperlipidemia, Hypertensive heart disease with diastolic heart failure, Morbid obesity with body mass index (BMI) of 40.0 or higher, KAMRAN (obstructive sleep apnea), KAMRAN on CPAP, Osteoporosis without current pathological fracture, Physical deconditioning, Status post total left knee replacement 5/1/2017, and Type 2 diabetes mellitus with stage 3 chronic kidney disease, without long-term current use of insulin.     Pt presents to clinic for diabetic foot exam. Pt relates she has fungal toe nails and dry skin on the feet.   She uses Gold Bond for diabetics lotion.   She is using Kerasal nail on her toenails with some improvement.    No other pedal complaints at this time.         PCP:  Liz Roberts MD  Last visit:    Diabetes Mellitus (PCP Liz Roberts last seen 5/16/19)               Past Medical History:   Diagnosis Date    Acid reflux     Age-related osteoporosis without current pathological fracture 1/11/2019    Cataract     CKD (chronic kidney disease) stage 3, GFR 30-59 ml/min     Dry mouth     History of TIA (transient ischemic attack) 12/11/2018    Hyperlipidemia 12/2/2014    Hypertensive heart disease with diastolic heart failure 11/28/2012    Morbid obesity with body mass index (BMI) of 40.0 or higher 5/9/2016    KAMRAN (obstructive sleep apnea)     KAMRAN on CPAP 6/28/2016    Osteoporosis without current pathological fracture 11/11/2018    Physical deconditioning 11/5/2018    Status post total left knee replacement 5/1/2017 5/16/2017    Type 2 diabetes mellitus with  "stage 3 chronic kidney disease, without long-term current use of insulin 5/16/2019             Current Outpatient Medications on File Prior to Visit   Medication Sig Dispense Refill    alcohol swabs (ALCOHOL PADS) PadM Apply 1 each topically as needed. 11 each 11    alendronate (FOSAMAX) 70 MG tablet Take 1 tablet (70 mg total) by mouth every 7 days. Take with a full glass of water & remain upright for at least 30 minutes 12 tablet 3    amLODIPine (NORVASC) 10 MG tablet Take 1 tablet (10 mg total) by mouth once daily. 30 tablet 11    aspirin (ECOTRIN) 325 MG EC tablet Take 1 tablet (325 mg total) by mouth once daily.  0    atorvastatin (LIPITOR) 80 MG tablet Take 1 tablet (80 mg total) by mouth once daily. 90 tablet 3    blood sugar diagnostic Strp 1 strip by Misc.(Non-Drug; Combo Route) route once daily. 100 strip 3    diclofenac sodium (VOLTAREN) 1 % Gel Apply 2 g topically 4 (four) times daily. 1 Tube 2    ferrous sulfate (FEOSOL) 325 mg (65 mg iron) Tab tablet Take 1 tablet (325 mg total) by mouth once daily. 90 tablet 0    flu vacc [id number],65yr up,PF (FLUZONE HIGH-DOSE 2019-20, PF,) 180 mcg/0.5 mL Syrg inject 0.5 ml into the muscle for one dose 0.5 mL 0    glipiZIDE (GLUCOTROL) 10 MG tablet Take 2 tablets (20 mg total) by mouth daily with breakfast. 60 tablet 2    insulin detemir U-100 (LEVEMIR FLEXTOUCH) 100 unit/mL (3 mL) SubQ InPn pen Inject 15 Units into the skin every evening. 1 Box 2    lancets Misc 1 lancet by Misc.(Non-Drug; Combo Route) route once daily. 100 each 3    losartan (COZAAR) 100 MG tablet Take 1 tablet (100 mg total) by mouth once daily. 90 tablet 3    metoprolol succinate (TOPROL-XL) 100 MG 24 hr tablet Take 1 tablet (100 mg total) by mouth once daily. 30 tablet 11    multivitamin (ONE DAILY MULTIVITAMIN) per tablet Take 1 tablet by mouth once daily.      pen needle, diabetic 31 gauge x 5/16" Ndle Use every evening. 100 each 11     No current facility-administered " medications on file prior to visit.          Review of patient's allergies indicates:   Allergen Reactions    Keflex [cephalexin] Other (See Comments)     Turns orange    Bactrim [sulfamethoxazole-trimethoprim]      Generic version  Sulfa makes her sick         Social History     Socioeconomic History    Marital status:      Spouse name: Not on file    Number of children: 1    Years of education: Not on file    Highest education level: Not on file   Occupational History    Not on file   Social Needs    Financial resource strain: Not on file    Food insecurity:     Worry: Not on file     Inability: Not on file    Transportation needs:     Medical: Not on file     Non-medical: Not on file   Tobacco Use    Smoking status: Never Smoker    Smokeless tobacco: Never Used   Substance and Sexual Activity    Alcohol use: Yes     Alcohol/week: 0.0 standard drinks     Comment: social drinker    Drug use: No    Sexual activity: Yes     Partners: Male     Birth control/protection: Post-menopausal   Lifestyle    Physical activity:     Days per week: Not on file     Minutes per session: Not on file    Stress: Not on file   Relationships    Social connections:     Talks on phone: Not on file     Gets together: Not on file     Attends Nondenominational service: Not on file     Active member of club or organization: Not on file     Attends meetings of clubs or organizations: Not on file     Relationship status: Not on file   Other Topics Concern    Not on file   Social History Narrative     with a son living locally.  at [employer], Retired 5/18.           Review of Systems   Constitutional: Negative.    HENT: Negative.    Eyes: Negative.    Respiratory: Negative.    Cardiovascular: Negative.    Gastrointestinal: Negative.    Endocrine: Negative.    Genitourinary: Negative.    Musculoskeletal: Negative.    Skin: Negative.    Allergic/Immunologic: Negative.    Neurological: Negative.     Hematological: Negative.    Psychiatric/Behavioral: Negative.        Objective:        Vitals:    10/21/19 1453   BP: (!) 158/73   BP Location: Left arm   Patient Position: Sitting   BP Method: Large (Automatic)   Pulse: 84         Physical Exam   Constitutional: She is oriented to person, place, and time. She appears well-developed and well-nourished.   Cardiovascular: Intact distal pulses.   DP and PT pulses 2/4 loreta. No edema noted loreta. Capillary refill time < 3 seconds to digits 1-5, loreta.    Musculoskeletal: Normal range of motion. She exhibits deformity. She exhibits no edema or tenderness.   HAV noted to loreta 1st MPJ. 5/5 strength to all LE muscle groups. No POP noted   Neurological: She is alert and oriented to person, place, and time.   Sensation intact to digits, loreta, via SWMF   Skin: Skin is warm and dry. Capillary refill takes 2 to 3 seconds. No erythema.   Toenails x 10 are elongated by 1-3mm, mycotic with subungual debris      Psychiatric: She has a normal mood and affect. Her behavior is normal. Judgment and thought content normal.               Assessment:       Problem List Items Addressed This Visit     None      Visit Diagnoses     Type II diabetes mellitus with neurological manifestations    -  Primary    Dermatophytosis of nail        Corn or callus                Plan:              -I counseled the pt on her condition and management    -Shoe inspection. Diabetic Foot Education. Patient reminded of the importance of good nutrition and blood sugar control to help prevent podiatric complications of diabetes. Patient instructed on proper foot hygeine. We discussed wearing proper shoe gear, daily foot inspections, never walking without protective shoe gear    -Continue Kerasal nail .  Available OTC.  Use daily for at least 6-8 months on the toenails.     -Continue Gold bond lotion for diabetics for dry skin on the feet.  Using daily.       Routine Foot Care  Date/Time: 10/21/2019 2:45 PM  Performed  by: Donna Gillis DPM  Authorized by: Donna Gillis DPM     Consent Done?:  Yes (Verbal)  Hyperkeratotic Skin Lesions?: Yes    Number of trimmed lesions:  6  Location(s):  Left 1st Toe, Left 1st Metatarsal Head, Right 1st Metatarsal Head, Right 1st Toe, Left Heel and Right Heel    Nail Care Type:  Debride  Location(s): All  (Left 1st Toe, Left 3rd Toe, Left 2nd Toe, Left 4th Toe, Left 5th Toe, Right 1st Toe, Right 2nd Toe, Right 3rd Toe, Right 4th Toe and Right 5th Toe)  Patient tolerance:  Patient tolerated the procedure well with no immediate complications     With patient's permission, the toenails mentioned above were aggressively reduced and debrided using a nail nipper, removing all offending nail and debris. Utilizing a #15 scalpel, I trimmed the corns and calluses at the above mentioned location.      The patient will continue to monitor the areas daily, inspect the feet, wear protective shoe gear when ambulatory, and moisturizer to maintain skin integrity.              Donna Gillis DPM  NPI: [id number]         [address] [address]  Dept: [phone]  Dept Fax: [phone]              Donna Gillis DPM  NPI: [id number]         [address] [address]  Dept: [phone]  Dept Fax: [phone]

## 2019-10-21 NOTE — PATIENT INSTRUCTIONS
How to Check Your Feet    Below are tips to help you look for foot problems. Try to check your feet at the same time each day, such as when you get out of bed in the morning.    · Check the top of each foot. The tops of toes, back of the heel, and outer edge of the foot can get a lot of rubbing from poor-fitting shoes.    · Check the bottom of each foot. Daily wear and tear often leads to problems at pressure spots.    · Check the toes and nails. Fungal infections often occur between toes. Toenail problems can also be a sign of fungal infections or lead to breaks in the skin.    · Check your shoes, too. Loose objects inside a shoe can injure the foot. Use your hand to feel inside your shoes for things like gissell, loose stitching, or rough areas that could irritate your skin.        Diabetic Foot Care    Diabetes can lead to a number of different foot complications. Fortunately, most of these complications can be prevented with a little extra foot care. If diabetes is not well controlled, the high blood sugar can cause damage to blood vessels and result in poor circulation to the foot. When the skin does not get enough blood flow, it becomes prone to pressure sores and ulcers, which heal slowly.  High blood sugar can also damage nerves, interfering with the ability to feel pain and pressure. When you cant feel your foot normally, it is easy to injure your skin, bones and joints without knowing it. For these reasons diabetes increases the risk of fungal infections, bunions and ulcers. Deep ulcers can lead to bone infection. Gangrene is the most serious foot complication of diabetes. It usually occurs on the tips of the toes as blacked areas of skin. The black area is dead tissue. In severe cases, gangrene spreads to involve the entire toe, other toes and the entire foot. Foot or toe amputation may be required. Good foot care and blood sugar control can prevent this.    Home Care  1. Wear comfortable, proper fitting  shoes.  2. Wash your feet daily with warm water and mild soap.  3. After drying, apply a moisturizing cream or lotion.  4. Check your feet daily for skin breaks, blisters, swelling, or redness. Look between your toes also.  5. Wear cotton socks and change them every day.  6. Trim toe nails carefully and do not cut your cuticles.  7. Strive to keep your blood sugar under control with a combination of medicines, diet and activity.  8. If you smoke and have diabetes, it is very important that you stop. Smoking reduces blood flow to your foot.  9. Avoid activities that increase your risk of foot injury:  · Do not walk barefoot.  · Do not use heating pads or hot water bottles on your feet.  · Do not put your foot in a hot tub without first checking the temperature with your hand.  10) Schedule yearly foot exams.    Follow Up  with your doctor or as advised by our staff. Report any cut, puncture, scrape, other injury, blister, ingrown toenail or ulcer on your foot.    Get Prompt Medical Attention  if any of the following occur:  -- Open ulcer with pus draining from the wound  -- Increasing foot or leg pain  -- New areas of redness or swelling or tender areas of the foot    © 9340-9258 [employer]. [address] [place] [address], [place] [address]. All rights reserved. This information is not intended as a substitute for professional medical care. Always follow your healthcare professional's instructions.

## 2019-10-22 ENCOUNTER — CLINICAL SUPPORT (OUTPATIENT)
Dept: REHABILITATION | Facility: HOSPITAL | Age: 74
End: 2019-10-22
Payer: MEDICARE

## 2019-10-22 DIAGNOSIS — M25.519 SHOULDER PAIN, UNSPECIFIED CHRONICITY, UNSPECIFIED LATERALITY: ICD-10-CM

## 2019-10-22 PROBLEM — M25.512 CHRONIC LEFT SHOULDER PAIN: Status: RESOLVED | Noted: 2019-04-24 | Resolved: 2019-10-22

## 2019-10-22 PROBLEM — G89.29 CHRONIC LEFT SHOULDER PAIN: Status: RESOLVED | Noted: 2019-04-24 | Resolved: 2019-10-22

## 2019-10-22 PROCEDURE — 97110 THERAPEUTIC EXERCISES: CPT | Mod: PO

## 2019-10-22 PROCEDURE — 97161 PT EVAL LOW COMPLEX 20 MIN: CPT | Mod: PO

## 2019-10-22 NOTE — PLAN OF CARE
[facility]  Physical Therapy Initial Evaluation    Name: Kaylee Romero  [facility] Number: [id number]    Therapy Diagnosis:   Encounter Diagnosis   Name Primary?    Shoulder pain, unspecified chronicity, unspecified laterality      Physician: Percy Ibarra MD    Physician Orders: PT Eval and Treat L shoulder pain  Medical Diagnosis from Referral: OA of L shoulder unspecified OA type  Evaluation Date: 10/22/2019  Authorization Period Expiration: 11/5/19  Plan of Care Expiration: 12/30/19  Visit # / Visits authorized: 1/ 6    Time In: 10:00  Time Out: 11:00  Total Billable Time: 60 minutes    Precautions: Standard, Diabetes and s/p CABG x2 8/12/19     Subjective   Date of onset: Sept 2019  History of current condition - Kaylee reports: she had been doing okay with her L shoulder pain however following CABG 8/12/19 she re injured it getting out of bed.  Pt reports after she d/c home, she was unable to transfer out of her bed and was sliding out of the bed then had her  was help her transfer floor to stand. Pt reports her  was unable to transfer her to standing, after which her  activated EMS, in ED it was discovered she had a UTI which was contributing to weaknesdes. Pt reports her shoulder pain seems somewhat random, can occur at night when not doing anything. Pt reports during one of her hospital stays her BP was elevated/ poorly controled at night which was correlated to increased L shoulder pain. Pt reports her L shoulder pain mainly effects her transfers but her ROM seems to be doing okay. Pt would like to be able to perform all ADLs, transfers and reduce pain in her L shoulder. Pt would also like to pursue cardiac rehab.      Past Medical History:   Diagnosis Date    Acid reflux     Age-related osteoporosis without current pathological fracture 1/11/2019    Cataract     CKD (chronic kidney disease) stage 3, GFR 30-59 ml/min     Dry mouth     History of  TIA (transient ischemic attack) 2018    Hyperlipidemia 2014    Hypertensive heart disease with diastolic heart failure 2012    Morbid obesity with body mass index (BMI) of 40.0 or higher 2016    KAMRAN (obstructive sleep apnea)     KAMRAN on CPAP 2016    Osteoporosis without current pathological fracture 2018    Physical deconditioning 2018    Status post total left knee replacement 2017    Type 2 diabetes mellitus with stage 3 chronic kidney disease, without long-term current use of insulin 2019     Kaylee Romero  has a past surgical history that includes  section; ankle surgery (l); Appendectomy; Knee arthroscopy w/ debridement; Dilation and curettage of uterus; Knee surgery (Left, 2017); Left heart catheterization (Left, 2019); Coronary Artery Bypass Graft (CABG) (N/A, 2019); Endoscopic harvest of vein (Left, 2019); and Coronary artery bypass graft (2019).    Kaylee has a current medication list which includes the following prescription(s): alcohol swabs, alendronate, amlodipine, aspirin, atorvastatin, blood sugar diagnostic, diclofenac sodium, ferrous sulfate, flu vacc [id number](65yr up)pf, glipizide, insulin detemir u-100, lancets, losartan, metoprolol succinate, multivitamin, and pen needle, diabetic.    Review of patient's allergies indicates:   Allergen Reactions    Bactrim [sulfamethoxazole-trimethoprim] Other (See Comments)     Generic version  Sulfa makes her sick    Keflex [cephalexin] Other (See Comments)     Turns orange        Imaging, X-ray: 9/10/19:   COMPARISON:  Bilateral shoulder radiograph 2019    Left shoulder radiograph 10/04/2018    FINDINGS:  Postoperative changes of sternotomy.    Mild degenerative disease of the acromioclavicular joint.  No evidence of any acute fracture, dislocation, subluxation, chondrocalcinosis, or bony destruction..      Impression       Mild degenerative disease of  the acromioclavicular joint.     Prior Therapy: PT in the past successful for relieving shoulder pain  Social History: Pt lives w/    Occupation: Retired educator   Prior Level of Function: Pt independent with all ADLs, job responsibilities and participating in regular physical activity  Current Level of Function: Pt limited with ADLs, job responsibilities and participating in regular physical activity    Pain:  Current 4/10, worst 9/10, best 3/10   Location: left shoulder, radiating down L arm  Description: Aching, Dull, Deep, Numb, Sharp and Electric  Aggravating Factors: Sitting, Laying, Touching, Extension, Flexing, Lifting and Getting out of bed/chair  Easing Factors: pain medication and rest    Pts goals: Get back into physical activity regiment, be able to reach overhead and     Objective     BP taken in L UE in sittin/88    Posture: FHP, FSP    AROM Right Left Comment Normal   Shoulder Flexion: WFL degrees 125 degrees  P! On L 180 deg   Shoulder Extension: 60 degrees 30 degrees  P! On L 60 deg   Shoulder Abduction: WFL degrees 155 degrees  P! On L  180 deg   Shoulder ER: 90 degrees 60 degrees  P! On L 90 deg   Shoulder IR: 80 degrees 30 degrees  P! On L 90 deg   PROM Right Left Comment Normal   Shoulder Flexion: WNL degrees WNL degrees  P! At end range on L 180 deg   Shoulder Abduction: WNL degrees WNL degrees  P! At end range on L 180 deg   Shoulder ER, 90°ABD: WNL degrees WNL degrees  90 deg   Shoulder IR, 90° ABD: WNL degrees WNL degrees  90 deg   *denotes pain     Right Left Comment   Shoulder flexion: 4+/5 4-/5    Shoulder Abduction: 4+/5 4-/5    Shoulder ER: 4+/5 3+/5    Shoulder IR: 4+/5 3+/5    Lower Trap: 3/5 3/5    Middle Trap: 3+/5 3/5     4+/5 4+/5      Palpation: inc tissue density to L pec major/minor, upper trap    Joint mobility: Post/ inf GH glide restricted on L vs R    CMS Impairment/Limitation/Restriction for Clinical Assessment    Therapist reviewed FOTO scores for  Kaylee Romero on 10/22/2019.   FOTO documents entered into [employer] - see Media section.    Limitation Score: 45%  Category: Mobility    Current : CK = at least 40% but < 60% impaired, limited or restricted  Goal: CJ = at least 20% but < 40% impaired, limited or restricted         TREATMENT   Treatment Time In: 10:45  Treatment Time Out: 11:00  Total Treatment time separate from Evaluation: 15 minutes    Kaylee received therapeutic exercises to develop strength, endurance, ROM, flexibility and posture for 15 minutes including:    Scap retraction / depression 3 sec holds x20  OTB I's 2x10  OTB rows 2x10  OTB ER 2x10 (towel under elbow)  OTB IR 2x10 (towel under elbow)    Home Exercises and Patient Education Provided    Education provided re: posture, activity modification, signs/ sx of non orthopedic pain    Written Home Exercises Provided: All exercises performed during today's treatment were printed and given to pt.  Exercises were reviewed and Kaylee was able to demonstrate them prior to the end of the session.   Pt received a written copy of exercises to perform at home. Kaylee demonstrated good  understanding of the education provided.     Assessment   Kaylee is a 74 y.o. female referred to outpatient Physical Therapy with a medical diagnosis of L shoulder pain. Pt presents with decreased strength, decreased ROM, decreased flexibility, faulty posture, and increased pain. Due to impairments, pt is unable to perform ADLs or transfers at [facility].     Pt prognosis is Good.   Pt will benefit from skilled outpatient Physical Therapy to address the deficits stated above and in the chart below, provide pt/family education, and to maximize pt's level of independence.     Plan of care discussed with patient: Yes  Pt's spiritual, cultural and educational needs considered and patient is agreeable to the plan of care and goals as stated below:     Anticipated Barriers for therapy: cardiac comorbidities      Medical Necessity is  demonstrated by the following  History  Co-morbidities and personal factors that may impact the plan of care Co-morbidities:   CAD, coping style/mechanism, diabetes, high BMI, level of undertstanding of current condition, poor medication/medical compliance and s/p CABG    Personal Factors:   attitudes     moderate   Examination  Body Structures and Functions, activity limitations and participation restrictions that may impact the plan of care Body Regions:   upper extremities    Body Systems:    ROM  strength  gait  transfers    Participation Restrictions:   Routine physical activity    Activity limitations:   Learning and applying knowledge  no deficits    General Tasks and Commands  no deficits    Communication  no deficits    Mobility  lifting and carrying objects  walking    Self care  washing oneself (bathing, drying, washing hands)  caring for body parts (brushing teeth, shaving, grooming)  dressing    Domestic Life  shopping  cooking  doing house work (cleaning house, washing dishes, laundry)    Interactions/Relationships  no deficits    Life Areas  no deficits    Community and Social Life  no deficits         low   Clinical Presentation unstable clinical presentation with unpredictable characteristics moderate   Decision Making/ Complexity Score: low     Goals:    1. Pt will be independent with HEP, demonstrate good form and report participating in walking program for at least 15 minutes/day 5 days/week  2. Pt will demonstrate pain free AROM of the L shoulder of at least 140 deg flexion, 110 deg abduction and 50 degrees of ER/IR @90 deg abduction to demonstrate improved functional mobility  3. Pt will be able to transfer from supine to sit with less than 2/10 pain in L shoulder     Long Term Goals: 10 weeks   1. Pt will demonstrate full/symmetrical AROM to bilat shoulders reporting no pain at end range to demonstrate improved functional mobility  2. Pt will be able to independently don/doff shirt/ bra strap  without reports of pain to maximize pt's independence    3. Pt will be able to lift 5# overhead 5x with L UE reporting less than 2/10 pain in order to demonstrate improved shoulder strength and function.    Plan   Plan of care Certification: 10/22/2019 to 12/30/19.    Outpatient Physical Therapy 2 times weekly for 10 weeks to include the following interventions: Cervical/Lumbar Traction, Gait Training, Manual Therapy, Moist Heat/ Ice, Neuromuscular Re-ed, Patient Education, Self Care, Therapeutic Activites, Therapeutic Exercise and Ultrasound.     Leonard Germain, PT

## 2019-10-25 ENCOUNTER — PATIENT MESSAGE (OUTPATIENT)
Dept: CARDIOLOGY | Facility: CLINIC | Age: 74
End: 2019-10-25

## 2019-10-25 ENCOUNTER — PATIENT MESSAGE (OUTPATIENT)
Dept: CARDIOTHORACIC SURGERY | Facility: CLINIC | Age: 74
End: 2019-10-25

## 2019-10-25 NOTE — TELEPHONE ENCOUNTER
"Spoke to patient regarding sensation of "heart struggling" with some episodes of HTN. Denies chest pain, palpitations, racing/bounding heart beat, SOB. Just states she felt her heart 'struggled' while she was at rest several nights ago and once today. Resolved with relaxation. Unable to describe sensation. Informed her that she does not need to take nitro or extra aspirin. Should be safe to fly. Instructed patient to contact Dr. Henderson's office for medication adjustment.   "

## 2019-10-26 ENCOUNTER — HOSPITAL ENCOUNTER (INPATIENT)
Facility: HOSPITAL | Age: 74
LOS: 3 days | Discharge: HOME | DRG: 247 | End: 2019-10-29
Attending: EMERGENCY MEDICINE | Admitting: HOSPITALIST
Payer: MEDICARE

## 2019-10-26 DIAGNOSIS — I25.700 CORONARY ARTERY DISEASE INVOLVING CORONARY BYPASS GRAFT OF NATIVE HEART WITH UNSTABLE ANGINA PECTORIS: ICD-10-CM

## 2019-10-26 DIAGNOSIS — R07.9 CHEST PAIN, RULE OUT ACUTE MYOCARDIAL INFARCTION: ICD-10-CM

## 2019-10-26 DIAGNOSIS — R07.9 CHEST PAIN: ICD-10-CM

## 2019-10-26 DIAGNOSIS — I21.4 NSTEMI (NON-ST ELEVATED MYOCARDIAL INFARCTION): ICD-10-CM

## 2019-10-26 DIAGNOSIS — I25.10 CAD (CORONARY ARTERY DISEASE): ICD-10-CM

## 2019-10-26 DIAGNOSIS — I10 HYPERTENSION, UNSPECIFIED TYPE: Primary | ICD-10-CM

## 2019-10-26 PROBLEM — R78.81 E COLI BACTEREMIA: Status: RESOLVED | Noted: 2019-09-11 | Resolved: 2019-10-26

## 2019-10-26 PROBLEM — N39.0 E. COLI UTI: Status: RESOLVED | Noted: 2019-09-11 | Resolved: 2019-10-26

## 2019-10-26 PROBLEM — B96.20 E. COLI UTI: Status: RESOLVED | Noted: 2019-09-11 | Resolved: 2019-10-26

## 2019-10-26 PROBLEM — B96.20 E COLI BACTEREMIA: Status: RESOLVED | Noted: 2019-09-11 | Resolved: 2019-10-26

## 2019-10-26 PROBLEM — I25.118 CORONARY ARTERY DISEASE OF NATIVE ARTERY OF NATIVE HEART WITH STABLE ANGINA PECTORIS: Status: ACTIVE | Noted: 2019-08-04

## 2019-10-26 PROBLEM — I25.708 CORONARY ARTERY DISEASE OF BYPASS GRAFT OF NATIVE HEART WITH STABLE ANGINA PECTORIS: Status: ACTIVE | Noted: 2019-08-04

## 2019-10-26 LAB
ALBUMIN SERPL BCP-MCNC: 3.7 G/DL (ref 3.5–5.2)
ALP SERPL-CCNC: 109 U/L (ref 55–135)
ALT SERPL W/O P-5'-P-CCNC: 28 U/L (ref 10–44)
ANION GAP SERPL CALC-SCNC: 11 MMOL/L (ref 8–16)
ANION GAP SERPL CALC-SCNC: 12 MMOL/L (ref 8–16)
ANION GAP SERPL CALC-SCNC: 9 MMOL/L (ref 8–16)
APTT BLDCRRT: 23.7 SEC (ref 21–32)
APTT BLDCRRT: 43.4 SEC (ref 21–32)
AST SERPL-CCNC: 29 U/L (ref 10–40)
BASOPHILS # BLD AUTO: 0.02 K/UL (ref 0–0.2)
BASOPHILS # BLD AUTO: 0.04 K/UL (ref 0–0.2)
BASOPHILS # BLD AUTO: 0.04 K/UL (ref 0–0.2)
BASOPHILS NFR BLD: 0.4 % (ref 0–1.9)
BASOPHILS NFR BLD: 0.6 % (ref 0–1.9)
BASOPHILS NFR BLD: 0.7 % (ref 0–1.9)
BILIRUB SERPL-MCNC: 0.2 MG/DL (ref 0.1–1)
BNP SERPL-MCNC: 111 PG/ML (ref 0–99)
BUN SERPL-MCNC: 14 MG/DL (ref 8–23)
BUN SERPL-MCNC: 15 MG/DL (ref 8–23)
BUN SERPL-MCNC: 17 MG/DL (ref 8–23)
CALCIUM SERPL-MCNC: 9 MG/DL (ref 8.7–10.5)
CALCIUM SERPL-MCNC: 9.1 MG/DL (ref 8.7–10.5)
CALCIUM SERPL-MCNC: 9.8 MG/DL (ref 8.7–10.5)
CHLORIDE SERPL-SCNC: 106 MMOL/L (ref 95–110)
CHLORIDE SERPL-SCNC: 107 MMOL/L (ref 95–110)
CHLORIDE SERPL-SCNC: 108 MMOL/L (ref 95–110)
CO2 SERPL-SCNC: 20 MMOL/L (ref 23–29)
CREAT SERPL-MCNC: 0.8 MG/DL (ref 0.5–1.4)
CREAT SERPL-MCNC: 0.9 MG/DL (ref 0.5–1.4)
CREAT SERPL-MCNC: 1.1 MG/DL (ref 0.5–1.4)
DIFFERENTIAL METHOD: ABNORMAL
EOSINOPHIL # BLD AUTO: 0.1 K/UL (ref 0–0.5)
EOSINOPHIL NFR BLD: 1.5 % (ref 0–8)
EOSINOPHIL NFR BLD: 1.7 % (ref 0–8)
EOSINOPHIL NFR BLD: 1.8 % (ref 0–8)
ERYTHROCYTE [DISTWIDTH] IN BLOOD BY AUTOMATED COUNT: 14.9 % (ref 11.5–14.5)
ERYTHROCYTE [DISTWIDTH] IN BLOOD BY AUTOMATED COUNT: 14.9 % (ref 11.5–14.5)
ERYTHROCYTE [DISTWIDTH] IN BLOOD BY AUTOMATED COUNT: 15.3 % (ref 11.5–14.5)
EST. GFR  (AFRICAN AMERICAN): 58 ML/MIN/1.73 M^2
EST. GFR  (AFRICAN AMERICAN): >60 ML/MIN/1.73 M^2
EST. GFR  (AFRICAN AMERICAN): >60 ML/MIN/1.73 M^2
EST. GFR  (NON AFRICAN AMERICAN): 50.3 ML/MIN/1.73 M^2
EST. GFR  (NON AFRICAN AMERICAN): >60 ML/MIN/1.73 M^2
EST. GFR  (NON AFRICAN AMERICAN): >60 ML/MIN/1.73 M^2
ESTIMATED AVG GLUCOSE: 143 MG/DL (ref 68–131)
GLUCOSE SERPL-MCNC: 129 MG/DL (ref 70–110)
GLUCOSE SERPL-MCNC: 145 MG/DL (ref 70–110)
GLUCOSE SERPL-MCNC: 163 MG/DL (ref 70–110)
HBA1C MFR BLD HPLC: 6.6 % (ref 4–5.6)
HCT VFR BLD AUTO: 31.5 % (ref 37–48.5)
HCT VFR BLD AUTO: 31.9 % (ref 37–48.5)
HCT VFR BLD AUTO: 36.3 % (ref 37–48.5)
HGB BLD-MCNC: 10.1 G/DL (ref 12–16)
HGB BLD-MCNC: 10.3 G/DL (ref 12–16)
HGB BLD-MCNC: 11.2 G/DL (ref 12–16)
IMM GRANULOCYTES # BLD AUTO: 0.01 K/UL (ref 0–0.04)
IMM GRANULOCYTES # BLD AUTO: 0.02 K/UL (ref 0–0.04)
IMM GRANULOCYTES # BLD AUTO: 0.02 K/UL (ref 0–0.04)
IMM GRANULOCYTES NFR BLD AUTO: 0.2 % (ref 0–0.5)
IMM GRANULOCYTES NFR BLD AUTO: 0.3 % (ref 0–0.5)
IMM GRANULOCYTES NFR BLD AUTO: 0.4 % (ref 0–0.5)
INR PPP: 1 (ref 0.8–1.2)
LYMPHOCYTES # BLD AUTO: 1.2 K/UL (ref 1–4.8)
LYMPHOCYTES # BLD AUTO: 1.5 K/UL (ref 1–4.8)
LYMPHOCYTES # BLD AUTO: 1.6 K/UL (ref 1–4.8)
LYMPHOCYTES NFR BLD: 21.5 % (ref 18–48)
LYMPHOCYTES NFR BLD: 22.8 % (ref 18–48)
LYMPHOCYTES NFR BLD: 26.2 % (ref 18–48)
MAGNESIUM SERPL-MCNC: 1.6 MG/DL (ref 1.6–2.6)
MAGNESIUM SERPL-MCNC: 1.7 MG/DL (ref 1.6–2.6)
MCH RBC QN AUTO: 26.7 PG (ref 27–31)
MCH RBC QN AUTO: 27 PG (ref 27–31)
MCH RBC QN AUTO: 27.4 PG (ref 27–31)
MCHC RBC AUTO-ENTMCNC: 30.9 G/DL (ref 32–36)
MCHC RBC AUTO-ENTMCNC: 32.1 G/DL (ref 32–36)
MCHC RBC AUTO-ENTMCNC: 32.3 G/DL (ref 32–36)
MCV RBC AUTO: 84 FL (ref 82–98)
MCV RBC AUTO: 85 FL (ref 82–98)
MCV RBC AUTO: 86 FL (ref 82–98)
MONOCYTES # BLD AUTO: 0.5 K/UL (ref 0.3–1)
MONOCYTES NFR BLD: 6.6 % (ref 4–15)
MONOCYTES NFR BLD: 8.4 % (ref 4–15)
MONOCYTES NFR BLD: 8.9 % (ref 4–15)
NEUTROPHILS # BLD AUTO: 3.3 K/UL (ref 1.8–7.7)
NEUTROPHILS # BLD AUTO: 3.6 K/UL (ref 1.8–7.7)
NEUTROPHILS # BLD AUTO: 5.1 K/UL (ref 1.8–7.7)
NEUTROPHILS NFR BLD: 62.8 % (ref 38–73)
NEUTROPHILS NFR BLD: 65.7 % (ref 38–73)
NEUTROPHILS NFR BLD: 69.5 % (ref 38–73)
NRBC BLD-RTO: 0 /100 WBC
PHOSPHATE SERPL-MCNC: 3 MG/DL (ref 2.7–4.5)
PHOSPHATE SERPL-MCNC: 3.1 MG/DL (ref 2.7–4.5)
PLATELET # BLD AUTO: 223 K/UL (ref 150–350)
PLATELET # BLD AUTO: 229 K/UL (ref 150–350)
PLATELET # BLD AUTO: 235 K/UL (ref 150–350)
PMV BLD AUTO: 10.6 FL (ref 9.2–12.9)
PMV BLD AUTO: 9.6 FL (ref 9.2–12.9)
PMV BLD AUTO: 9.9 FL (ref 9.2–12.9)
POCT GLUCOSE: 132 MG/DL (ref 70–110)
POCT GLUCOSE: 164 MG/DL (ref 70–110)
POCT GLUCOSE: 168 MG/DL (ref 70–110)
POTASSIUM SERPL-SCNC: 3.3 MMOL/L (ref 3.5–5.1)
POTASSIUM SERPL-SCNC: 3.7 MMOL/L (ref 3.5–5.1)
POTASSIUM SERPL-SCNC: 4.4 MMOL/L (ref 3.5–5.1)
PROT SERPL-MCNC: 9.1 G/DL (ref 6–8.4)
PROTHROMBIN TIME: 10.5 SEC (ref 9–12.5)
RBC # BLD AUTO: 3.74 M/UL (ref 4–5.4)
RBC # BLD AUTO: 3.76 M/UL (ref 4–5.4)
RBC # BLD AUTO: 4.2 M/UL (ref 4–5.4)
SODIUM SERPL-SCNC: 136 MMOL/L (ref 136–145)
SODIUM SERPL-SCNC: 138 MMOL/L (ref 136–145)
SODIUM SERPL-SCNC: 139 MMOL/L (ref 136–145)
TROPONIN I SERPL DL<=0.01 NG/ML-MCNC: 0.03 NG/ML (ref 0–0.03)
TROPONIN I SERPL DL<=0.01 NG/ML-MCNC: 0.09 NG/ML (ref 0–0.03)
TROPONIN I SERPL DL<=0.01 NG/ML-MCNC: 0.2 NG/ML (ref 0–0.03)
TROPONIN I SERPL DL<=0.01 NG/ML-MCNC: 0.78 NG/ML (ref 0–0.03)
TROPONIN I SERPL DL<=0.01 NG/ML-MCNC: 3.17 NG/ML (ref 0–0.03)
TROPONIN I SERPL DL<=0.01 NG/ML-MCNC: 4.79 NG/ML (ref 0–0.03)
WBC # BLD AUTO: 5.05 K/UL (ref 3.9–12.7)
WBC # BLD AUTO: 5.8 K/UL (ref 3.9–12.7)
WBC # BLD AUTO: 7.27 K/UL (ref 3.9–12.7)

## 2019-10-26 PROCEDURE — 11000001 HC ACUTE MED/SURG PRIVATE ROOM

## 2019-10-26 PROCEDURE — 84484 ASSAY OF TROPONIN QUANT: CPT | Mod: 91

## 2019-10-26 PROCEDURE — 63600175 PHARM REV CODE 636 W HCPCS: Performed by: EMERGENCY MEDICINE

## 2019-10-26 PROCEDURE — 99220 PR INITIAL OBSERVATION CARE,LEVL III: ICD-10-PCS | Mod: ,,, | Performed by: PHYSICIAN ASSISTANT

## 2019-10-26 PROCEDURE — 85025 COMPLETE CBC W/AUTO DIFF WBC: CPT | Mod: 91

## 2019-10-26 PROCEDURE — 25000003 PHARM REV CODE 250: Performed by: EMERGENCY MEDICINE

## 2019-10-26 PROCEDURE — 36415 COLL VENOUS BLD VENIPUNCTURE: CPT

## 2019-10-26 PROCEDURE — 99285 EMERGENCY DEPT VISIT HI MDM: CPT | Mod: ,,, | Performed by: EMERGENCY MEDICINE

## 2019-10-26 PROCEDURE — 94761 N-INVAS EAR/PLS OXIMETRY MLT: CPT

## 2019-10-26 PROCEDURE — 99285 PR EMERGENCY DEPT VISIT,LEVEL V: ICD-10-PCS | Mod: ,,, | Performed by: EMERGENCY MEDICINE

## 2019-10-26 PROCEDURE — 80048 BASIC METABOLIC PNL TOTAL CA: CPT | Mod: 91

## 2019-10-26 PROCEDURE — 99900035 HC TECH TIME PER 15 MIN (STAT)

## 2019-10-26 PROCEDURE — 93010 EKG 12-LEAD: ICD-10-PCS | Mod: 76,,, | Performed by: INTERNAL MEDICINE

## 2019-10-26 PROCEDURE — 85610 PROTHROMBIN TIME: CPT

## 2019-10-26 PROCEDURE — 99220 PR INITIAL OBSERVATION CARE,LEVL III: CPT | Mod: ,,, | Performed by: PHYSICIAN ASSISTANT

## 2019-10-26 PROCEDURE — 27000190 HC CPAP FULL FACE MASK W/VALVE

## 2019-10-26 PROCEDURE — 84100 ASSAY OF PHOSPHORUS: CPT

## 2019-10-26 PROCEDURE — 63600175 PHARM REV CODE 636 W HCPCS: Performed by: HOSPITALIST

## 2019-10-26 PROCEDURE — G0378 HOSPITAL OBSERVATION PER HR: HCPCS

## 2019-10-26 PROCEDURE — 93010 ELECTROCARDIOGRAM REPORT: CPT | Mod: ,,, | Performed by: INTERNAL MEDICINE

## 2019-10-26 PROCEDURE — 83735 ASSAY OF MAGNESIUM: CPT | Mod: 91

## 2019-10-26 PROCEDURE — 93010 ELECTROCARDIOGRAM REPORT: CPT | Mod: 76,,, | Performed by: INTERNAL MEDICINE

## 2019-10-26 PROCEDURE — 93005 ELECTROCARDIOGRAM TRACING: CPT

## 2019-10-26 PROCEDURE — 63600175 PHARM REV CODE 636 W HCPCS: Performed by: PHYSICIAN ASSISTANT

## 2019-10-26 PROCEDURE — 83735 ASSAY OF MAGNESIUM: CPT

## 2019-10-26 PROCEDURE — 99285 EMERGENCY DEPT VISIT HI MDM: CPT | Mod: 25

## 2019-10-26 PROCEDURE — 85730 THROMBOPLASTIN TIME PARTIAL: CPT

## 2019-10-26 PROCEDURE — 80053 COMPREHEN METABOLIC PANEL: CPT

## 2019-10-26 PROCEDURE — 25000003 PHARM REV CODE 250: Performed by: PHYSICIAN ASSISTANT

## 2019-10-26 PROCEDURE — 84100 ASSAY OF PHOSPHORUS: CPT | Mod: 91

## 2019-10-26 PROCEDURE — 83880 ASSAY OF NATRIURETIC PEPTIDE: CPT

## 2019-10-26 PROCEDURE — 80048 BASIC METABOLIC PNL TOTAL CA: CPT

## 2019-10-26 PROCEDURE — 25000003 PHARM REV CODE 250: Performed by: HOSPITALIST

## 2019-10-26 PROCEDURE — 83036 HEMOGLOBIN GLYCOSYLATED A1C: CPT

## 2019-10-26 PROCEDURE — 94660 CPAP INITIATION&MGMT: CPT

## 2019-10-26 RX ORDER — NITROGLYCERIN 0.4 MG/1
0.4 TABLET SUBLINGUAL
Status: COMPLETED | OUTPATIENT
Start: 2019-10-26 | End: 2019-10-26

## 2019-10-26 RX ORDER — NITROGLYCERIN 0.4 MG/1
TABLET SUBLINGUAL
Status: DISPENSED
Start: 2019-10-26 | End: 2019-10-26

## 2019-10-26 RX ORDER — SODIUM CHLORIDE 0.9 % (FLUSH) 0.9 %
5 SYRINGE (ML) INJECTION
Status: DISCONTINUED | OUTPATIENT
Start: 2019-10-26 | End: 2019-10-29 | Stop reason: HOSPADM

## 2019-10-26 RX ORDER — SODIUM CHLORIDE 0.9 % (FLUSH) 0.9 %
10 SYRINGE (ML) INJECTION
Status: DISCONTINUED | OUTPATIENT
Start: 2019-10-26 | End: 2019-10-29 | Stop reason: HOSPADM

## 2019-10-26 RX ORDER — ATORVASTATIN CALCIUM 20 MG/1
80 TABLET, FILM COATED ORAL DAILY
Status: DISCONTINUED | OUTPATIENT
Start: 2019-10-26 | End: 2019-10-29 | Stop reason: HOSPADM

## 2019-10-26 RX ORDER — GLUCAGON 1 MG
1 KIT INJECTION
Status: DISCONTINUED | OUTPATIENT
Start: 2019-10-26 | End: 2019-10-29 | Stop reason: HOSPADM

## 2019-10-26 RX ORDER — AMLODIPINE BESYLATE 10 MG/1
10 TABLET ORAL DAILY
Status: DISCONTINUED | OUTPATIENT
Start: 2019-10-26 | End: 2019-10-29 | Stop reason: HOSPADM

## 2019-10-26 RX ORDER — POLYETHYLENE GLYCOL 3350 17 G/17G
17 POWDER, FOR SOLUTION ORAL DAILY
Status: DISCONTINUED | OUTPATIENT
Start: 2019-10-26 | End: 2019-10-29 | Stop reason: HOSPADM

## 2019-10-26 RX ORDER — LABETALOL HCL 20 MG/4 ML
SYRINGE (ML) INTRAVENOUS
Status: DISPENSED
Start: 2019-10-26 | End: 2019-10-26

## 2019-10-26 RX ORDER — HEPARIN SODIUM,PORCINE/D5W 25000/250
12 INTRAVENOUS SOLUTION INTRAVENOUS CONTINUOUS
Status: DISCONTINUED | OUTPATIENT
Start: 2019-10-26 | End: 2019-10-28

## 2019-10-26 RX ORDER — LABETALOL HYDROCHLORIDE 5 MG/ML
10 INJECTION, SOLUTION INTRAVENOUS
Status: DISCONTINUED | OUTPATIENT
Start: 2019-10-26 | End: 2019-10-26

## 2019-10-26 RX ORDER — NITROGLYCERIN 0.4 MG/1
0.4 TABLET SUBLINGUAL EVERY 5 MIN PRN
Status: DISCONTINUED | OUTPATIENT
Start: 2019-10-26 | End: 2019-10-29 | Stop reason: HOSPADM

## 2019-10-26 RX ORDER — HEPARIN SODIUM 5000 [USP'U]/ML
5000 INJECTION, SOLUTION INTRAVENOUS; SUBCUTANEOUS EVERY 8 HOURS
Status: DISCONTINUED | OUTPATIENT
Start: 2019-10-26 | End: 2019-10-26

## 2019-10-26 RX ORDER — NAPROXEN SODIUM 220 MG/1
81 TABLET, FILM COATED ORAL DAILY
Status: DISCONTINUED | OUTPATIENT
Start: 2019-10-27 | End: 2019-10-29 | Stop reason: HOSPADM

## 2019-10-26 RX ORDER — RAMELTEON 8 MG/1
8 TABLET ORAL NIGHTLY PRN
Status: DISCONTINUED | OUTPATIENT
Start: 2019-10-26 | End: 2019-10-29 | Stop reason: HOSPADM

## 2019-10-26 RX ORDER — METOPROLOL SUCCINATE 100 MG/1
100 TABLET, EXTENDED RELEASE ORAL DAILY
Status: DISCONTINUED | OUTPATIENT
Start: 2019-10-26 | End: 2019-10-29 | Stop reason: HOSPADM

## 2019-10-26 RX ORDER — LABETALOL HCL 20 MG/4 ML
10 SYRINGE (ML) INTRAVENOUS
Status: DISCONTINUED | OUTPATIENT
Start: 2019-10-26 | End: 2019-10-26

## 2019-10-26 RX ORDER — ASPIRIN 325 MG
325 TABLET, DELAYED RELEASE (ENTERIC COATED) ORAL DAILY
Status: DISCONTINUED | OUTPATIENT
Start: 2019-10-26 | End: 2019-10-26 | Stop reason: ALTCHOICE

## 2019-10-26 RX ORDER — LOSARTAN POTASSIUM 50 MG/1
100 TABLET ORAL DAILY
Status: DISCONTINUED | OUTPATIENT
Start: 2019-10-26 | End: 2019-10-29 | Stop reason: HOSPADM

## 2019-10-26 RX ORDER — IBUPROFEN 200 MG
24 TABLET ORAL
Status: DISCONTINUED | OUTPATIENT
Start: 2019-10-26 | End: 2019-10-29 | Stop reason: HOSPADM

## 2019-10-26 RX ORDER — ASPIRIN 325 MG
325 TABLET ORAL
Status: COMPLETED | OUTPATIENT
Start: 2019-10-26 | End: 2019-10-26

## 2019-10-26 RX ORDER — BISACODYL 10 MG
10 SUPPOSITORY, RECTAL RECTAL DAILY PRN
Status: DISCONTINUED | OUTPATIENT
Start: 2019-10-26 | End: 2019-10-29 | Stop reason: HOSPADM

## 2019-10-26 RX ORDER — FERROUS SULFATE 325(65) MG
325 TABLET, DELAYED RELEASE (ENTERIC COATED) ORAL DAILY
Status: DISCONTINUED | OUTPATIENT
Start: 2019-10-26 | End: 2019-10-29 | Stop reason: HOSPADM

## 2019-10-26 RX ORDER — IPRATROPIUM BROMIDE AND ALBUTEROL SULFATE 2.5; .5 MG/3ML; MG/3ML
3 SOLUTION RESPIRATORY (INHALATION) EVERY 4 HOURS PRN
Status: DISCONTINUED | OUTPATIENT
Start: 2019-10-26 | End: 2019-10-29 | Stop reason: HOSPADM

## 2019-10-26 RX ORDER — IBUPROFEN 200 MG
16 TABLET ORAL
Status: DISCONTINUED | OUTPATIENT
Start: 2019-10-26 | End: 2019-10-29 | Stop reason: HOSPADM

## 2019-10-26 RX ORDER — ACETAMINOPHEN 325 MG/1
650 TABLET ORAL EVERY 4 HOURS PRN
Status: DISCONTINUED | OUTPATIENT
Start: 2019-10-26 | End: 2019-10-29 | Stop reason: HOSPADM

## 2019-10-26 RX ORDER — INSULIN ASPART 100 [IU]/ML
0-5 INJECTION, SOLUTION INTRAVENOUS; SUBCUTANEOUS
Status: DISCONTINUED | OUTPATIENT
Start: 2019-10-26 | End: 2019-10-29 | Stop reason: HOSPADM

## 2019-10-26 RX ORDER — LABETALOL HCL 20 MG/4 ML
10 SYRINGE (ML) INTRAVENOUS EVERY 4 HOURS PRN
Status: DISCONTINUED | OUTPATIENT
Start: 2019-10-26 | End: 2019-10-29 | Stop reason: HOSPADM

## 2019-10-26 RX ORDER — HYDROCODONE BITARTRATE AND ACETAMINOPHEN 5; 325 MG/1; MG/1
1 TABLET ORAL EVERY 6 HOURS PRN
Status: DISCONTINUED | OUTPATIENT
Start: 2019-10-26 | End: 2019-10-29 | Stop reason: HOSPADM

## 2019-10-26 RX ORDER — ONDANSETRON 8 MG/1
8 TABLET, ORALLY DISINTEGRATING ORAL EVERY 8 HOURS PRN
Status: DISCONTINUED | OUTPATIENT
Start: 2019-10-26 | End: 2019-10-29 | Stop reason: HOSPADM

## 2019-10-26 RX ADMIN — ACETAMINOPHEN 650 MG: 325 TABLET ORAL at 09:10

## 2019-10-26 RX ADMIN — METOPROLOL SUCCINATE 100 MG: 100 TABLET, EXTENDED RELEASE ORAL at 08:10

## 2019-10-26 RX ADMIN — ASPIRIN 325 MG ORAL TABLET 325 MG: 325 PILL ORAL at 01:10

## 2019-10-26 RX ADMIN — ASPIRIN 325 MG: 325 TABLET, COATED ORAL at 08:10

## 2019-10-26 RX ADMIN — NITROGLYCERIN 0.4 MG: 0.4 TABLET SUBLINGUAL at 07:10

## 2019-10-26 RX ADMIN — NITROGLYCERIN 0.4 MG: 0.4 TABLET SUBLINGUAL at 08:10

## 2019-10-26 RX ADMIN — ATORVASTATIN CALCIUM 80 MG: 20 TABLET, FILM COATED ORAL at 08:10

## 2019-10-26 RX ADMIN — FERROUS SULFATE TAB EC 325 MG (65 MG FE EQUIVALENT) 325 MG: 325 (65 FE) TABLET DELAYED RESPONSE at 09:10

## 2019-10-26 RX ADMIN — ONDANSETRON 8 MG: 8 TABLET, ORALLY DISINTEGRATING ORAL at 05:10

## 2019-10-26 RX ADMIN — HEPARIN SODIUM 5000 UNITS: 5000 INJECTION, SOLUTION INTRAVENOUS; SUBCUTANEOUS at 06:10

## 2019-10-26 RX ADMIN — TICAGRELOR 180 MG: 90 TABLET ORAL at 12:10

## 2019-10-26 RX ADMIN — NITROGLYCERIN 0.4 MG: 0.4 TABLET SUBLINGUAL at 01:10

## 2019-10-26 RX ADMIN — AMLODIPINE BESYLATE 10 MG: 10 TABLET ORAL at 08:10

## 2019-10-26 RX ADMIN — NITROGLYCERIN 0.4 MG: 0.4 TABLET SUBLINGUAL at 04:10

## 2019-10-26 RX ADMIN — NITROGLYCERIN 0.4 MG: 0.4 TABLET SUBLINGUAL at 09:10

## 2019-10-26 RX ADMIN — HEPARIN SODIUM 12 UNITS/KG/HR: 10000 INJECTION, SOLUTION INTRAVENOUS at 01:10

## 2019-10-26 RX ADMIN — LOSARTAN POTASSIUM 100 MG: 50 TABLET, FILM COATED ORAL at 08:10

## 2019-10-26 NOTE — SUBJECTIVE & OBJECTIVE
Past Medical History:   Diagnosis Date    Acid reflux     Age-related osteoporosis without current pathological fracture 2019    Cataract     CKD (chronic kidney disease) stage 3, GFR 30-59 ml/min     Dry mouth     History of TIA (transient ischemic attack) 2018    Hyperlipidemia 2014    Hypertensive heart disease with diastolic heart failure 2012    Morbid obesity with body mass index (BMI) of 40.0 or higher 2016    KAMRAN (obstructive sleep apnea)     KAMRAN on CPAP 2016    Osteoporosis without current pathological fracture 2018    Physical deconditioning 2018    Status post total left knee replacement 2017    Type 2 diabetes mellitus with stage 3 chronic kidney disease, without long-term current use of insulin 2019       Past Surgical History:   Procedure Laterality Date    ankle surgery (l)      APPENDECTOMY       SECTION      CORONARY ARTERY BYPASS GRAFT  09/2019    x 2    CORONARY ARTERY BYPASS GRAFT (CABG) N/A 2019    Procedure: CORONARY ARTERY BYPASS GRAFT (CABG)X3;  Surgeon: Peterson Ventura MD;  Location: [facility] OR [address] [place] [address];  Service: Cardiovascular;  Laterality: N/A;    DILATION AND CURETTAGE OF UTERUS      ENDOSCOPIC HARVEST OF VEIN Left 2019    Procedure: SURGICAL PROCUREMENT, VEIN, ENDOSCOPIC;  Surgeon: Peterson Ventura MD;  Location: [facility] OR [address] [place] [address];  Service: Cardiovascular;  Laterality: Left;  Vein [place] Start: 08; End: 1054   Vein Prep Start: 1055; End: 1145    KNEE ARTHROSCOPY W/ DEBRIDEMENT      KNEE SURGERY Left 2017    TKR    LEFT HEART CATHETERIZATION Left 2019    Procedure: Left heart cath;  Surgeon: Ronak Gibbons MD;  Location: [facility] CATH LAB;  Service: Cardiology;  Laterality: Left;       Review of patient's allergies indicates:   Allergen Reactions    Bactrim [sulfamethoxazole-trimethoprim] Other (See Comments)     Generic version  Sulfa makes her sick    Keflex  "[cephalexin] Other (See Comments)     Turns orange       No current facility-administered medications on file prior to encounter.      Current Outpatient Medications on File Prior to Encounter   Medication Sig    alcohol swabs (ALCOHOL PADS) PadM Apply 1 each topically as needed.    alendronate (FOSAMAX) 70 MG tablet Take 1 tablet (70 mg total) by mouth every 7 days. Take with a full glass of water & remain upright for at least 30 minutes    amLODIPine (NORVASC) 10 MG tablet Take 1 tablet (10 mg total) by mouth once daily.    aspirin (ECOTRIN) 325 MG EC tablet Take 1 tablet (325 mg total) by mouth once daily.    atorvastatin (LIPITOR) 80 MG tablet Take 1 tablet (80 mg total) by mouth once daily.    blood sugar diagnostic Strp 1 strip by Misc.(Non-Drug; Combo Route) route once daily.    diclofenac sodium (VOLTAREN) 1 % Gel Apply 2 g topically 4 (four) times daily.    ferrous sulfate (FEOSOL) 325 mg (65 mg iron) Tab tablet Take 1 tablet (325 mg total) by mouth once daily.    flu vacc [id number],65yr up,PF (FLUZONE HIGH-DOSE 2019-20, PF,) 180 mcg/0.5 mL Syrg inject 0.5 ml into the muscle for one dose    glipiZIDE (GLUCOTROL) 10 MG tablet Take 2 tablets (20 mg total) by mouth daily with breakfast.    insulin detemir U-100 (LEVEMIR FLEXTOUCH) 100 unit/mL (3 mL) SubQ InPn pen Inject 15 Units into the skin every evening.    lancets Misc 1 lancet by Misc.(Non-Drug; Combo Route) route once daily.    losartan (COZAAR) 100 MG tablet Take 1 tablet (100 mg total) by mouth once daily.    metoprolol succinate (TOPROL-XL) 100 MG 24 hr tablet Take 1 tablet (100 mg total) by mouth once daily.    multivitamin (ONE DAILY MULTIVITAMIN) per tablet Take 1 tablet by mouth once daily.    pen needle, diabetic 31 gauge x 5/16" Ndle Use every evening.     Family History     Problem Relation (Age of Onset)    Diabetes Father    Heart disease Mother, Father    Heart failure Mother, Father    No Known Problems Brother, Son    " Stroke Father, Paternal Grandfather        Tobacco Use    Smoking status: Never Smoker    Smokeless tobacco: Never Used   Substance and Sexual Activity    Alcohol use: Yes     Alcohol/week: 0.0 standard drinks     Comment: social drinker    Drug use: No    Sexual activity: Yes     Partners: Male     Birth control/protection: Post-menopausal     Review of Systems   Constitutional: Negative for activity change, chills, diaphoresis, fatigue and fever.   HENT: Negative for congestion and sinus pain.    Eyes: Negative for photophobia and visual disturbance.   Respiratory: Negative for cough, chest tightness and shortness of breath.    Cardiovascular: Positive for chest pain. Negative for palpitations and leg swelling.   Gastrointestinal: Negative for abdominal distention, abdominal pain, anal bleeding, diarrhea, nausea and vomiting.   Genitourinary: Negative for difficulty urinating and flank pain.   Musculoskeletal: Positive for arthralgias and gait problem.   Skin: Positive for wound (surgical). Negative for rash.   Allergic/Immunologic: Negative for immunocompromised state.   Neurological: Positive for weakness. Negative for dizziness, syncope, facial asymmetry, speech difficulty, light-headedness, numbness and headaches.   Hematological: Does not bruise/bleed easily.   Psychiatric/Behavioral: Negative for agitation and confusion. The patient is nervous/anxious.      Objective:     Vital Signs (Most Recent):  Temp: 98.6 °F (37 °C) (10/26/19 0020)  Pulse: 92 (10/26/19 0200)  Resp: 17 (10/26/19 0200)  BP: (!) 169/81 (10/26/19 0200)  SpO2: 98 % (10/26/19 0200) Vital Signs (24h Range):  Temp:  [98.6 °F (37 °C)] 98.6 °F (37 °C)  Pulse:  [92-96] 92  Resp:  [17-20] 17  SpO2:  [98 %-100 %] 98 %  BP: (169-217)/(81-86) 169/81        Body mass index is 41.01 kg/m².    Physical Exam   Constitutional: She is oriented to person, place, and time. She appears well-developed and well-nourished.   HENT:   Head: Normocephalic and  atraumatic.   Eyes: EOM are normal. Right eye exhibits no discharge. Left eye exhibits no discharge.   Neck: Neck supple. No JVD present.   Cardiovascular: Normal rate, regular rhythm and intact distal pulses.   No murmur heard.  Trace BLE edema   Pulmonary/Chest: Effort normal and breath sounds normal. No respiratory distress. She has no wheezes. She has no rales. She exhibits no tenderness.   Abdominal: Soft. She exhibits no distension. There is no tenderness. There is no guarding.   Musculoskeletal: Normal range of motion. She exhibits no edema or deformity.   Neurological: She is alert and oriented to person, place, and time. No cranial nerve deficit.   Skin: Skin is warm and dry.   Well healed sternal incision   Psychiatric: She has a normal mood and affect. Her behavior is normal. Judgment and thought content normal.   Nursing note and vitals reviewed.        CRANIAL NERVES     CN III, IV, VI   Extraocular motions are normal.        Significant Labs:   CBC:   Recent Labs   Lab 10/26/19  0052   WBC 7.27   HGB 11.2*   HCT 36.3*        CMP:   Recent Labs   Lab 10/26/19  0052      K 3.7      CO2 20*   *   BUN 17   CREATININE 1.1   CALCIUM 9.8   PROT 9.1*   ALBUMIN 3.7   BILITOT 0.2   ALKPHOS 109   AST 29   ALT 28   ANIONGAP 12   EGFRNONAA 50.3*     Cardiac Markers:   Recent Labs   Lab 10/26/19  0052   *     TSH:   Recent Labs   Lab 05/23/19  1443   TSH 2.307     Urine Culture: No results for input(s): LABURIN in the last 48 hours.  Urine Studies: No results for input(s): COLORU, APPEARANCEUA, PHUR, SPECGRAV, PROTEINUA, GLUCUA, KETONESU, BILIRUBINUA, OCCULTUA, NITRITE, UROBILINOGEN, LEUKOCYTESUR, RBCUA, WBCUA, BACTERIA, SQUAMEPITHEL, HYALINECASTS in the last 48 hours.    Invalid input(s): KATIESUIRIS    Significant Imaging: I have reviewed all pertinent imaging results/findings within the past 24 hours.

## 2019-10-26 NOTE — NURSING
"Pt complained to FREDRICK Velasco of chest discomfort.  BENEDICT Wells requested that we draw labs and get EKG and then give Nitro.  Upon offering the pt nitro the patient refused stating, "It has gone away now."  "

## 2019-10-26 NOTE — ED NOTES
Pt. Resting comfortably in stretcher on cardiac and o2 monitor. bp cycling q30m. Denies cp at this time. No acute distress noted. Will continue to monitor frequently. Plan to admit.

## 2019-10-26 NOTE — NURSING
"Pt reports CP rates 7/10. Describes as "stabbing pain" to left chest and constant. Pt requesting dose of nitrostat.   "

## 2019-10-26 NOTE — ED TRIAGE NOTES
Initial EKG No STEMI    Pt is a 75 yo F with PMH of CAD s/p CABG who presents with chest pain that feels like when she last had a heart attack.  Pain is in her left chest radiating down her left arm and has been intermittent for the last few days  Will order chest pain protocol

## 2019-10-26 NOTE — ED TRIAGE NOTES
Pt. Presents to ED today with c/o left sided chest pain with radiation to left arm, constant, since 2330. Pt. Denies sob, n/v/d. Hx of two bypasses, last MI in June. Pain 9/10 at this time. Speaking full sentences, non labored breathing, sating 100% RA.

## 2019-10-26 NOTE — Clinical Note
Angiography performed post intervention of the right coronary arteries. Angiography performed via hand injection with .

## 2019-10-26 NOTE — NURSING
Pt arrived to the unit.  Cardiac monitor on at this time.  No complaints at this time.  Oriented to room.

## 2019-10-26 NOTE — NURSING
"Pt reports chest pain described as "squeezing constant pain". Pt denies SOB, pain non radiating. Pt alert and does not appear to be in acute distress. VSS Nitostat x1 dose administered.   "

## 2019-10-26 NOTE — LETTER
October 29, 2019               [facility]  [address] [place]  [address]  Phone: [phone]  Fax: [phone] October 29, 2019     Patient: Kaylee Romero   YOB: 1945       To Whom It May Concern:    Kaylee Romero was admitted to the hospital for a cardiac event on 10/26/19 and discharged on 10/29/19.  She is medically unable to travel at this time. If you have any questions or concerns, please don't hesitate to call my office at [phone].      Sincerely,        Amy Oconnell MD  Department of Hospital Medicine

## 2019-10-26 NOTE — PLAN OF CARE
[facility] Staff F/u Note    Patient seen and examined at bedside. Pleasant 74F patient of Dr. Henderson and Dr. Ventura here for evaluation of chest pain. Patient s/p NSTEMI in 7/2019 and then CABG in 8/2019.  Patient has CP c/w prior myocardial ischemia events. Patient initiated on ACS protocol with plans to consult with interventional cardiology.    Russel Chavez MD  Department of Hospital Medicine

## 2019-10-26 NOTE — HPI
"Mrs. Romero is a 74F with CAD s/p NSTEMI 07/2019 and CABG x 2 08/12/2019 (KENDY to LAD and SVG to OM), IDDM, HTN, HLD, obesity, KAMRAN on CPAP, CKD who presents for evaluation of non-exertional CP relieved with NTG. Patient states that a few days ago she had a vague "strange sensation" in her chest "over her heart". She didn't think anything of it. On the evening of 10/25 at 2230, while watching TV she had pain associated with her strange feeling. This was the first time she has had chest pain like this since her surgery. She states the pain is similar to her prior MI with radiation down her left arm, which she says is different from her chronic left shoulder pain. She took NTG at home x 3 w/o relief. She recorded her BP during the pain episode and highest recording at home was 193/105. She denies pain with exertion. She denies incisional pain. She denies respiratory or GI symptoms. She has been complaint with her BP medications. She is frustrated by her repeat hospitalizations. She reports a return of her CP symptoms on admission, subsided w/o intervention.    Of note, she was hospitalized again soon after her CABG with urosepsis and discharged to SNF.     ED: BP max 217/85, Tn 0.026, EKG w/o ST/T wave changes,   "

## 2019-10-26 NOTE — NURSING
"Pt reports CP has improved since nitro administration. Rates current pain level '2/10". Will continue to monitor.  "

## 2019-10-26 NOTE — ED PROVIDER NOTES
Encounter Date: 10/26/2019       History     Chief Complaint   Patient presents with    Chest Pain     c/o intermittent L side chest discomfort x 4 days. States last 3 episodes were painful unlike the others. states pain feels just like last MI.      Patient is a 72-year-old female past medical history of CKD, CAD status post CABG 19, GERD, osteoporosis, diabetes type 2, I would say on CPAP, hypertension, hyperlipidemia, TIA who presents with left-sided chest pain that radiates down her left arm that started 3 days ago and has been intermittent.  Patient reports the pain started at rest.  She states it does feel exactly like when she had a heart attack.  She reports her last MI was 3 months ago.  She did take 3 nitro at home with minimal relief.         Review of patient's allergies indicates:   Allergen Reactions    Bactrim [sulfamethoxazole-trimethoprim] Other (See Comments)     Generic version  Sulfa makes her sick    Keflex [cephalexin] Other (See Comments)     Turns orange     Past Medical History:   Diagnosis Date    Acid reflux     Age-related osteoporosis without current pathological fracture 2019    Cataract     CKD (chronic kidney disease) stage 3, GFR 30-59 ml/min     Dry mouth     History of TIA (transient ischemic attack) 2018    Hyperlipidemia 2014    Hypertensive heart disease with diastolic heart failure 2012    Morbid obesity with body mass index (BMI) of 40.0 or higher 2016    KAMRAN (obstructive sleep apnea)     KAMRAN on CPAP 2016    Osteoporosis without current pathological fracture 2018    Physical deconditioning 2018    Status post total left knee replacement 2017    Type 2 diabetes mellitus with stage 3 chronic kidney disease, without long-term current use of insulin 2019     Past Surgical History:   Procedure Laterality Date    ankle surgery (l)      APPENDECTOMY       SECTION      CORONARY ARTERY  BYPASS GRAFT  09/2019    x 2    CORONARY ARTERY BYPASS GRAFT (CABG) N/A 8/12/2019    Procedure: CORONARY ARTERY BYPASS GRAFT (CABG)X3;  Surgeon: Peterson Ventura MD;  Location: [address];  Service: Cardiovascular;  Laterality: N/A;    DILATION AND CURETTAGE OF UTERUS      ENDOSCOPIC HARVEST OF VEIN Left 8/12/2019    Procedure: SURGICAL PROCUREMENT, VEIN, ENDOSCOPIC;  Surgeon: Peterson Ventura MD;  Location: [address];  Service: Cardiovascular;  Laterality: Left;  Vein Stark Start: 0843; End: 1054   Vein Prep Start: 1055; End: 1145    KNEE ARTHROSCOPY W/ DEBRIDEMENT      KNEE SURGERY Left 05/01/2017    TKR    LEFT HEART CATHETERIZATION Left 7/9/2019    Procedure: Left heart cath;  Surgeon: Ronak Gibbons MD;  Location: [facility] CATH LAB;  Service: Cardiology;  Laterality: Left;     Family History   Problem Relation Age of Onset    Heart disease Mother     Heart failure Mother     Heart disease Father     Stroke Father     Heart failure Father     Diabetes Father     Stroke Paternal Grandfather     No Known Problems Brother     No Known Problems Son     Breast cancer Neg Hx     Colon cancer Neg Hx     Ovarian cancer Neg Hx     Amblyopia Neg Hx     Blindness Neg Hx     Cancer Neg Hx     Cataracts Neg Hx     Glaucoma Neg Hx     Hypertension Neg Hx     Macular degeneration Neg Hx     Retinal detachment Neg Hx     Strabismus Neg Hx     Thyroid disease Neg Hx      Social History     Tobacco Use    Smoking status: Never Smoker    Smokeless tobacco: Never Used   Substance Use Topics    Alcohol use: Yes     Alcohol/week: 0.0 standard drinks     Comment: social drinker    Drug use: No     Review of Systems   Constitutional: Negative for chills and fever.   HENT: Negative for congestion.    Eyes: Negative for visual disturbance.   Respiratory: Negative for shortness of breath.    Cardiovascular: Positive for chest pain. Negative for palpitations and leg swelling.    Gastrointestinal: Negative for abdominal pain, diarrhea, nausea and vomiting.   Endocrine: Negative for polydipsia and polyuria.   Genitourinary: Negative for difficulty urinating and dysuria.   Musculoskeletal: Negative for back pain.   Skin: Negative for rash.   Allergic/Immunologic: Negative for immunocompromised state.   Neurological: Negative for light-headedness and headaches.   Hematological: Does not bruise/bleed easily.   Psychiatric/Behavioral: The patient is not nervous/anxious.    All other systems reviewed and are negative.      Physical Exam     Initial Vitals [10/26/19 0020]   BP Pulse Resp Temp SpO2   (!) 171/85 95 18 98.6 °F (37 °C) 100 %      MAP       --         Physical Exam    Nursing note and vitals reviewed.  Constitutional: She appears well-developed and well-nourished.   HENT:   Head: Normocephalic and atraumatic.   Eyes: EOM are normal.   Neck: Neck supple.   Cardiovascular: Normal rate, regular rhythm and intact distal pulses.   Pulmonary/Chest: Breath sounds normal. She has no wheezes.   Abdominal: Soft. There is no tenderness.   Musculoskeletal: Normal range of motion.   Neurological: She is alert and oriented to person, place, and time. She has normal strength. GCS score is 15. GCS eye subscore is 4. GCS verbal subscore is 5. GCS motor subscore is 6.   Skin: Skin is warm and dry.   Psychiatric: She has a normal mood and affect.         ED Course   Procedures  Labs Reviewed   CBC W/ AUTO DIFFERENTIAL - Abnormal; Notable for the following components:       Result Value    Hemoglobin 11.2 (*)     Hematocrit 36.3 (*)     Mean Corpuscular Hemoglobin 26.7 (*)     Mean Corpuscular Hemoglobin Conc 30.9 (*)     RDW 15.3 (*)     All other components within normal limits   COMPREHENSIVE METABOLIC PANEL - Abnormal; Notable for the following components:    CO2 20 (*)     Glucose 163 (*)     Total Protein 9.1 (*)     eGFR if  58.0 (*)     eGFR if non  50.3 (*)      All other components within normal limits   B-TYPE NATRIURETIC PEPTIDE - Abnormal; Notable for the following components:     (*)     All other components within normal limits   TROPONIN I        ECG Results          EKG 12-lead (Final result)  Result time 10/26/19 13:44:13    Final result by Interface, Lab In [facility] (10/26/19 13:44:13)                 Narrative:    Test Reason : R07.9,    Vent. Rate : 089 BPM     Atrial Rate : 089 BPM     P-R Int : 220 ms          QRS Dur : 080 ms      QT Int : 390 ms       P-R-T Axes : 074 -27 060 degrees     QTc Int : 474 ms    Sinus rhythm with marked sinus arrythmia with 1st degree A-V block  Voltage criteria for left ventricular hypertrophy  QT prolongation  Abnormal ECG  When compared with ECG of 26-OCT-2019 00:19,  No significant change was found  Confirmed by JOSE MARTIN POON, SARAH ([id number]) on 10/26/2019 11:09:54 AM    Referred By: [email or web address]   SELF           Confirmed By:SARAH PHILIPPE MD                            Imaging Results          X-Ray Chest AP Portable (Final result)  Result time 10/26/19 05:49:29    Final result by Chung Hoffman MD (10/26/19 05:49:29)                 Impression:      No acute process.      Electronically signed by: Chung Hoffman MD  Date:    10/26/2019  Time:    05:49             Narrative:    EXAMINATION:  XR CHEST AP PORTABLE    CLINICAL HISTORY:  chest pain;    TECHNIQUE:  Single frontal view of the chest was performed.    COMPARISON:  Chest radiograph from 10/09/2019    FINDINGS:  Postsurgical changes status post sternotomy.  No cardiomegaly.  Normal pulmonary vasculature.  Lungs are clear.  No pneumothorax.  No pleural effusion.  No evidence of acute fracture.                                 Medical Decision Making:   History:   Old Medical Records: I decided to obtain old medical records.  Old Records Summarized: other records.       <> Summary of Records: TTE 12/2018  · Increased (hyperdynamic) left ventricular systolic function. The  estimated ejection fraction is 75%  · Concentric left ventricular remodeling.  · No wall motion abnormalities.  · Normal LV diastolic function.  · Normal right ventricular systolic function.  · Normal central venous pressure (3 mm Hg).  · The estimated PA systolic pressure is 17 mm Hg  · Technically poor study     Initial Assessment:   Pt with chest pain- recent CABG and per  pt does have diseased coronary vessel that was going to require future stenting or bypass for  Differential Diagnosis:   ACS, MSK pain, PE, pneumonia, pneumothorax  Clinical Tests:   Lab Tests: Ordered and Reviewed  Radiological Study: Ordered and Reviewed  ED Management:  Discussed with Cardiologist- he recommends medicine observation,blood pressure control, serial troponin, interventional cardiology consult in the morning, does not recommend starting heparin for ACS protocol unless repeat troponin returns elevated  He reviewed EKG- sees no new signs of ischemia  Patient is chest pain-free, initial troponin is negative  Given aspirin and nitro in the ED    Pt continues to be pain free on my re-evaluation in the ER  Other:   I have discussed this case with another health care provider.       <> Summary of the Discussion: Pt placed in observation with hospitalist                   ED Course as of Oct 27 1342   Sat Oct 26, 2019   0148 I discussed with Cardiology fellow he reviewed her EKG and lab work, he recommends that she be admitted to Medicine  Interventional cardiology consult in the morning as she may need to be recathed    [GK]      ED Course User Index  [GK] Frances Maravilla MD     Clinical Impression:       ICD-10-CM ICD-9-CM   1. Hypertension, unspecified type I10 401.9   2. Chest pain R07.9 786.50   3. Chest pain, rule out acute myocardial infarction R07.9 786.50   4. NSTEMI (non-ST elevated myocardial infarction) I21.4 410.70                                Frances Maravilla MD  10/27/19 [id number]

## 2019-10-26 NOTE — ASSESSMENT & PLAN NOTE
- hold home detemir and glipizide  - start low dose SSI for now  - NPO  Lab Results   Component Value Date    HGBA1C 7.5 (H) 08/07/2019

## 2019-10-26 NOTE — H&P
"[facility]  History & Physical    Patient Name: Kaylee Romero  MRN: [id number]  Admission Date: 10/26/2019  Attending Physician: Frances Maravilla, *   Primary Care Provider: Liz Roberts MD    [facility] Medicine Team: [facility] C Chung Wells PA-C     Patient information was obtained from patient, past medical records and ER records.     Subjective:     Principal Problem:Coronary 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 pectoris    Chief Complaint:   Chief Complaint   Patient presents with    Chest Pain     c/o intermittent L side chest discomfort x 4 days. States last 3 episodes were painful unlike the others. states pain feels just like last MI.         HPI: Mrs. Romero is a 74F with CAD s/p NSTEMI 07/2019 and CABG x 2 08/12/2019 (KENDY to LAD and SVG to OM), IDDM, HTN, HLD, obesity, KAMRAN on CPAP, CKD who presents for evaluation of non-exertional CP relieved with NTG. Patient states that a few days ago she had a vague "strange sensation" in her chest "over her heart". She didn't think anything of it. On the evening of 10/25 at 2230, while watching TV she had pain associated with her strange feeling. This was the first time she has had chest pain like this since her surgery. She states the pain is similar to her prior MI with radiation down her left arm, which she says is different from her chronic left shoulder pain. She took NTG at home x 3 w/o relief. She recorded her BP during the pain episode and highest recording at home was 193/105. She denies pain with exertion. She denies incisional pain. She denies respiratory or GI symptoms. She has been complaint with her BP medications. She is frustrated by her repeat hospitalizations. She reports a return of her CP symptoms on admission, subsided w/o intervention.    Of note, she was hospitalized again soon after her CABG with urosepsis and discharged to SNF.     ED: BP max 217/85, Tn " 0.026, EKG w/o ST/T wave changes,     Past Medical History:   Diagnosis Date    Acid reflux     Age-related osteoporosis without current pathological fracture 2019    Cataract     CKD (chronic kidney disease) stage 3, GFR 30-59 ml/min     Dry mouth     History of TIA (transient ischemic attack) 2018    Hyperlipidemia 2014    Hypertensive heart disease with diastolic heart failure 2012    Morbid obesity with body mass index (BMI) of 40.0 or higher 2016    KAMRAN (obstructive sleep apnea)     KAMRAN on CPAP 2016    Osteoporosis without current pathological fracture 2018    Physical deconditioning 2018    Status post total left knee replacement 2017    Type 2 diabetes mellitus with stage 3 chronic kidney disease, without long-term current use of insulin 2019       Past Surgical History:   Procedure Laterality Date    ankle surgery (l)      APPENDECTOMY       SECTION      CORONARY ARTERY BYPASS GRAFT  09/2019    x 2    CORONARY ARTERY BYPASS GRAFT (CABG) N/A 2019    Procedure: CORONARY ARTERY BYPASS GRAFT (CABG)X3;  Surgeon: Peterson Ventura MD;  Location: [address];  Service: Cardiovascular;  Laterality: N/A;    DILATION AND CURETTAGE OF UTERUS      ENDOSCOPIC HARVEST OF VEIN Left 2019    Procedure: SURGICAL PROCUREMENT, VEIN, ENDOSCOPIC;  Surgeon: Peterson Ventura MD;  Location: [address];  Service: Cardiovascular;  Laterality: Left;  Vein [place] Start: 08; End: 1054   Vein Prep Start: 1055; End: 1145    KNEE ARTHROSCOPY W/ DEBRIDEMENT      KNEE SURGERY Left 2017    TKR    LEFT HEART CATHETERIZATION Left 2019    Procedure: Left heart cath;  Surgeon: Ronak Gibbons MD;  Location: [facility] CATH LAB;  Service: Cardiology;  Laterality: Left;       Review of patient's allergies indicates:   Allergen Reactions    Bactrim [sulfamethoxazole-trimethoprim] Other (See Comments)     Generic  "version  Sulfa makes her sick    Keflex [cephalexin] Other (See Comments)     Turns orange       No current facility-administered medications on file prior to encounter.      Current Outpatient Medications on File Prior to Encounter   Medication Sig    alcohol swabs (ALCOHOL PADS) PadM Apply 1 each topically as needed.    alendronate (FOSAMAX) 70 MG tablet Take 1 tablet (70 mg total) by mouth every 7 days. Take with a full glass of water & remain upright for at least 30 minutes    amLODIPine (NORVASC) 10 MG tablet Take 1 tablet (10 mg total) by mouth once daily.    aspirin (ECOTRIN) 325 MG EC tablet Take 1 tablet (325 mg total) by mouth once daily.    atorvastatin (LIPITOR) 80 MG tablet Take 1 tablet (80 mg total) by mouth once daily.    blood sugar diagnostic Strp 1 strip by Misc.(Non-Drug; Combo Route) route once daily.    diclofenac sodium (VOLTAREN) 1 % Gel Apply 2 g topically 4 (four) times daily.    ferrous sulfate (FEOSOL) 325 mg (65 mg iron) Tab tablet Take 1 tablet (325 mg total) by mouth once daily.    flu vacc po1961-69,65yr up,PF (FLUZONE HIGH-DOSE 2019-20, PF,) 180 mcg/0.5 mL Syrg inject 0.5 ml into the muscle for one dose    glipiZIDE (GLUCOTROL) 10 MG tablet Take 2 tablets (20 mg total) by mouth daily with breakfast.    insulin detemir U-100 (LEVEMIR FLEXTOUCH) 100 unit/mL (3 mL) SubQ InPn pen Inject 15 Units into the skin every evening.    lancets Misc 1 lancet by Misc.(Non-Drug; Combo Route) route once daily.    losartan (COZAAR) 100 MG tablet Take 1 tablet (100 mg total) by mouth once daily.    metoprolol succinate (TOPROL-XL) 100 MG 24 hr tablet Take 1 tablet (100 mg total) by mouth once daily.    multivitamin (ONE DAILY MULTIVITAMIN) per tablet Take 1 tablet by mouth once daily.    pen needle, diabetic 31 gauge x 5/16" Ndle Use every evening.     Family History     Problem Relation (Age of Onset)    Diabetes Father    Heart disease Mother, Father    Heart failure Mother, Father "    No Known Problems Brother, Son    Stroke Father, Paternal Grandfather        Tobacco Use    Smoking status: Never Smoker    Smokeless tobacco: Never Used   Substance and Sexual Activity    Alcohol use: Yes     Alcohol/week: 0.0 standard drinks     Comment: social drinker    Drug use: No    Sexual activity: Yes     Partners: Male     Birth control/protection: Post-menopausal     Review of Systems   Constitutional: Negative for activity change, chills, diaphoresis, fatigue and fever.   HENT: Negative for congestion and sinus pain.    Eyes: Negative for photophobia and visual disturbance.   Respiratory: Negative for cough, chest tightness and shortness of breath.    Cardiovascular: Positive for chest pain. Negative for palpitations and leg swelling.   Gastrointestinal: Negative for abdominal distention, abdominal pain, anal bleeding, diarrhea, nausea and vomiting.   Genitourinary: Negative for difficulty urinating and flank pain.   Musculoskeletal: Positive for arthralgias and gait problem.   Skin: Positive for wound (surgical). Negative for rash.   Allergic/Immunologic: Negative for immunocompromised state.   Neurological: Positive for weakness. Negative for dizziness, syncope, facial asymmetry, speech difficulty, light-headedness, numbness and headaches.   Hematological: Does not bruise/bleed easily.   Psychiatric/Behavioral: Negative for agitation and confusion. The patient is nervous/anxious.      Objective:     Vital Signs (Most Recent):  Temp: 98.6 °F (37 °C) (10/26/19 0020)  Pulse: 92 (10/26/19 0200)  Resp: 17 (10/26/19 0200)  BP: (!) 169/81 (10/26/19 0200)  SpO2: 98 % (10/26/19 0200) Vital Signs (24h Range):  Temp:  [98.6 °F (37 °C)] 98.6 °F (37 °C)  Pulse:  [92-96] 92  Resp:  [17-20] 17  SpO2:  [98 %-100 %] 98 %  BP: (169-217)/(81-86) 169/81        Body mass index is 41.01 kg/m².    Physical Exam   Constitutional: She is oriented to person, place, and time. She appears well-developed and  well-nourished.   HENT:   Head: Normocephalic and atraumatic.   Eyes: EOM are normal. Right eye exhibits no discharge. Left eye exhibits no discharge.   Neck: Neck supple. No JVD present.   Cardiovascular: Normal rate, regular rhythm and intact distal pulses.   No murmur heard.  Trace BLE edema   Pulmonary/Chest: Effort normal and breath sounds normal. No respiratory distress. She has no wheezes. She has no rales. She exhibits no tenderness.   Abdominal: Soft. She exhibits no distension. There is no tenderness. There is no guarding.   Musculoskeletal: Normal range of motion. She exhibits no edema or deformity.   Neurological: She is alert and oriented to person, place, and time. No cranial nerve deficit.   Skin: Skin is warm and dry.   Well healed sternal incision   Psychiatric: She has a normal mood and affect. Her behavior is normal. Judgment and thought content normal.   Nursing note and vitals reviewed.        CRANIAL NERVES     CN III, IV, VI   Extraocular motions are normal.        Significant Labs:   CBC:   Recent Labs   Lab 10/26/19  0052   WBC 7.27   HGB 11.2*   HCT 36.3*        CMP:   Recent Labs   Lab 10/26/19  0052      K 3.7      CO2 20*   *   BUN 17   CREATININE 1.1   CALCIUM 9.8   PROT 9.1*   ALBUMIN 3.7   BILITOT 0.2   ALKPHOS 109   AST 29   ALT 28   ANIONGAP 12   EGFRNONAA 50.3*     Cardiac Markers:   Recent Labs   Lab 10/26/19  0052   *     TSH:   Recent Labs   Lab 05/23/19  1443   TSH 2.307     Urine Culture: No results for input(s): LABURIN in the last 48 hours.  Urine Studies: No results for input(s): COLORU, APPEARANCEUA, PHUR, SPECGRAV, PROTEINUA, GLUCUA, KETONESU, BILIRUBINUA, OCCULTUA, NITRITE, UROBILINOGEN, LEUKOCYTESUR, RBCUA, WBCUA, BACTERIA, SQUAMEPITHEL, HYALINECASTS in the last 48 hours.    Invalid input(s): WRIGHTSUR    Significant Imaging: I have reviewed all pertinent imaging results/findings within the past 24 hours.    Assessment/Plan:     *  Coronary 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 pectoris  - non-exertional CP associated with high BP readings at home  - EKG w/o ischemic changes  - Tn negative  - trend both Tn and EKG  - maintain on tele  - NTG PRN  - interventional cardiology consulted per cardiology reccs  - needs better BP control    Type 2 diabetes mellitus with stage 3 chronic kidney disease and hypertension  - hold home detemir and glipizide  - start low dose SSI for now  - NPO  Lab Results   Component Value Date    HGBA1C 7.5 (H) 08/07/2019       CKD (chronic kidney disease) stage 3, GFR 30-59 ml/min  - stable    KAMRAN on CPAP  - CPAP nightly    Morbid obesity with body mass index (BMI) of 40.0 or higher  - obesity complicates all aspects of disease management from diagnostic modalities to treatment. Weight loss encouraged and health benefits explained to patient.      Hyperlipidemia  - continue statin and ASA    Essential hypertension  - uncontrolled BP on admission and during CP episodes  - continue norvasc 10 mg daily  - continue losartan 100 mg daily  - continue  mg daily  - start lopressor IVP PRN  - titrate medications as necessary    VTE Risk Mitigation (From admission, onward)         Ordered     heparin (porcine) injection 5,000 Units  Every 8 hours      10/26/19 0255     IP VTE HIGH RISK PATIENT  Once      10/26/19 0255     Place BARBARA hose  Until discontinued      10/26/19 0255     Place sequential compression device  Until discontinued      10/26/19 0255                   Chung Wells PA-C  Department of Hospital Medicine   [facility]

## 2019-10-26 NOTE — ASSESSMENT & PLAN NOTE
- obesity complicates all aspects of disease management from diagnostic modalities to treatment. Weight loss encouraged and health benefits explained to patient.

## 2019-10-26 NOTE — ASSESSMENT & PLAN NOTE
- uncontrolled BP on admission and during CP episodes  - continue norvasc 10 mg daily  - continue losartan 100 mg daily  - continue  mg daily  - start lopressor IVP PRN  - titrate medications as necessary

## 2019-10-26 NOTE — ASSESSMENT & PLAN NOTE
- non-exertional CP associated with high BP readings at home  - EKG w/o ischemic changes  - Tn negative  - trend both Tn and EKG  - maintain on tele  - NTG PRN  - interventional cardiology consulted per cardiology reccs  - needs better BP control

## 2019-10-27 PROBLEM — I25.700 CORONARY ARTERY DISEASE INVOLVING CORONARY BYPASS GRAFT OF NATIVE HEART WITH UNSTABLE ANGINA PECTORIS: Status: ACTIVE | Noted: 2019-08-04

## 2019-10-27 LAB
ANION GAP SERPL CALC-SCNC: 8 MMOL/L (ref 8–16)
APTT BLDCRRT: 40.2 SEC (ref 21–32)
APTT BLDCRRT: 42.4 SEC (ref 21–32)
BASOPHILS # BLD AUTO: 0.02 K/UL (ref 0–0.2)
BASOPHILS NFR BLD: 0.3 % (ref 0–1.9)
BUN SERPL-MCNC: 14 MG/DL (ref 8–23)
CALCIUM SERPL-MCNC: 8.8 MG/DL (ref 8.7–10.5)
CHLORIDE SERPL-SCNC: 106 MMOL/L (ref 95–110)
CO2 SERPL-SCNC: 24 MMOL/L (ref 23–29)
CREAT SERPL-MCNC: 1 MG/DL (ref 0.5–1.4)
DIFFERENTIAL METHOD: ABNORMAL
EOSINOPHIL # BLD AUTO: 0.2 K/UL (ref 0–0.5)
EOSINOPHIL NFR BLD: 2.4 % (ref 0–8)
ERYTHROCYTE [DISTWIDTH] IN BLOOD BY AUTOMATED COUNT: 14.9 % (ref 11.5–14.5)
EST. GFR  (AFRICAN AMERICAN): >60 ML/MIN/1.73 M^2
EST. GFR  (NON AFRICAN AMERICAN): 56.4 ML/MIN/1.73 M^2
GLUCOSE SERPL-MCNC: 141 MG/DL (ref 70–110)
HCT VFR BLD AUTO: 33.1 % (ref 37–48.5)
HGB BLD-MCNC: 10 G/DL (ref 12–16)
IMM GRANULOCYTES # BLD AUTO: 0.03 K/UL (ref 0–0.04)
IMM GRANULOCYTES NFR BLD AUTO: 0.5 % (ref 0–0.5)
LYMPHOCYTES # BLD AUTO: 1.5 K/UL (ref 1–4.8)
LYMPHOCYTES NFR BLD: 23.2 % (ref 18–48)
MAGNESIUM SERPL-MCNC: 1.6 MG/DL (ref 1.6–2.6)
MCH RBC QN AUTO: 26.2 PG (ref 27–31)
MCHC RBC AUTO-ENTMCNC: 30.2 G/DL (ref 32–36)
MCV RBC AUTO: 87 FL (ref 82–98)
MONOCYTES # BLD AUTO: 0.5 K/UL (ref 0.3–1)
MONOCYTES NFR BLD: 7.6 % (ref 4–15)
NEUTROPHILS # BLD AUTO: 4.2 K/UL (ref 1.8–7.7)
NEUTROPHILS NFR BLD: 66 % (ref 38–73)
NRBC BLD-RTO: 0 /100 WBC
PHOSPHATE SERPL-MCNC: 3.1 MG/DL (ref 2.7–4.5)
PLATELET # BLD AUTO: 237 K/UL (ref 150–350)
PMV BLD AUTO: 10.5 FL (ref 9.2–12.9)
POCT GLUCOSE: 126 MG/DL (ref 70–110)
POCT GLUCOSE: 142 MG/DL (ref 70–110)
POCT GLUCOSE: 156 MG/DL (ref 70–110)
POCT GLUCOSE: 195 MG/DL (ref 70–110)
POTASSIUM SERPL-SCNC: 3.6 MMOL/L (ref 3.5–5.1)
RBC # BLD AUTO: 3.81 M/UL (ref 4–5.4)
SODIUM SERPL-SCNC: 138 MMOL/L (ref 136–145)
TROPONIN I SERPL DL<=0.01 NG/ML-MCNC: 3.99 NG/ML (ref 0–0.03)
WBC # BLD AUTO: 6.29 K/UL (ref 3.9–12.7)

## 2019-10-27 PROCEDURE — 85730 THROMBOPLASTIN TIME PARTIAL: CPT

## 2019-10-27 PROCEDURE — 84100 ASSAY OF PHOSPHORUS: CPT

## 2019-10-27 PROCEDURE — 85025 COMPLETE CBC W/AUTO DIFF WBC: CPT

## 2019-10-27 PROCEDURE — 99233 SBSQ HOSP IP/OBS HIGH 50: CPT | Mod: ,,, | Performed by: HOSPITALIST

## 2019-10-27 PROCEDURE — 80048 BASIC METABOLIC PNL TOTAL CA: CPT

## 2019-10-27 PROCEDURE — 94660 CPAP INITIATION&MGMT: CPT

## 2019-10-27 PROCEDURE — 25000003 PHARM REV CODE 250: Performed by: HOSPITALIST

## 2019-10-27 PROCEDURE — 36415 COLL VENOUS BLD VENIPUNCTURE: CPT

## 2019-10-27 PROCEDURE — 11000001 HC ACUTE MED/SURG PRIVATE ROOM

## 2019-10-27 PROCEDURE — 63600175 PHARM REV CODE 636 W HCPCS: Performed by: HOSPITALIST

## 2019-10-27 PROCEDURE — 94761 N-INVAS EAR/PLS OXIMETRY MLT: CPT

## 2019-10-27 PROCEDURE — 99233 PR SUBSEQUENT HOSPITAL CARE,LEVL III: ICD-10-PCS | Mod: ,,, | Performed by: HOSPITALIST

## 2019-10-27 PROCEDURE — 83735 ASSAY OF MAGNESIUM: CPT

## 2019-10-27 PROCEDURE — G0378 HOSPITAL OBSERVATION PER HR: HCPCS

## 2019-10-27 PROCEDURE — 25000003 PHARM REV CODE 250: Performed by: PHYSICIAN ASSISTANT

## 2019-10-27 PROCEDURE — 99900035 HC TECH TIME PER 15 MIN (STAT)

## 2019-10-27 PROCEDURE — 84484 ASSAY OF TROPONIN QUANT: CPT

## 2019-10-27 RX ORDER — POTASSIUM CHLORIDE 20 MEQ/1
40 TABLET, EXTENDED RELEASE ORAL ONCE
Status: COMPLETED | OUTPATIENT
Start: 2019-10-27 | End: 2019-10-27

## 2019-10-27 RX ORDER — CYCLOBENZAPRINE HCL 5 MG
5 TABLET ORAL ONCE
Status: COMPLETED | OUTPATIENT
Start: 2019-10-27 | End: 2019-10-27

## 2019-10-27 RX ORDER — MAGNESIUM SULFATE HEPTAHYDRATE 40 MG/ML
2 INJECTION, SOLUTION INTRAVENOUS ONCE
Status: COMPLETED | OUTPATIENT
Start: 2019-10-27 | End: 2019-10-27

## 2019-10-27 RX ADMIN — CYCLOBENZAPRINE HYDROCHLORIDE 5 MG: 5 TABLET, FILM COATED ORAL at 09:10

## 2019-10-27 RX ADMIN — RAMELTEON 8 MG: 8 TABLET ORAL at 09:10

## 2019-10-27 RX ADMIN — MAGNESIUM SULFATE IN WATER 2 G: 40 INJECTION, SOLUTION INTRAVENOUS at 10:10

## 2019-10-27 RX ADMIN — TICAGRELOR 90 MG: 90 TABLET ORAL at 08:10

## 2019-10-27 RX ADMIN — ACETAMINOPHEN 650 MG: 325 TABLET ORAL at 05:10

## 2019-10-27 RX ADMIN — HYDROCODONE BITARTRATE AND ACETAMINOPHEN 1 TABLET: 5; 325 TABLET ORAL at 02:10

## 2019-10-27 RX ADMIN — AMLODIPINE BESYLATE 10 MG: 10 TABLET ORAL at 08:10

## 2019-10-27 RX ADMIN — ATORVASTATIN CALCIUM 80 MG: 20 TABLET, FILM COATED ORAL at 08:10

## 2019-10-27 RX ADMIN — LOSARTAN POTASSIUM 100 MG: 50 TABLET, FILM COATED ORAL at 08:10

## 2019-10-27 RX ADMIN — TICAGRELOR 90 MG: 90 TABLET ORAL at 12:10

## 2019-10-27 RX ADMIN — POTASSIUM CHLORIDE 40 MEQ: 1500 TABLET, EXTENDED RELEASE ORAL at 12:10

## 2019-10-27 RX ADMIN — TICAGRELOR 90 MG: 90 TABLET ORAL at 09:10

## 2019-10-27 RX ADMIN — HYDROCODONE BITARTRATE AND ACETAMINOPHEN 1 TABLET: 5; 325 TABLET ORAL at 03:10

## 2019-10-27 RX ADMIN — ACETAMINOPHEN 650 MG: 325 TABLET ORAL at 04:10

## 2019-10-27 RX ADMIN — ASPIRIN 81 MG CHEWABLE TABLET 81 MG: 81 TABLET CHEWABLE at 08:10

## 2019-10-27 RX ADMIN — HEPARIN SODIUM 12 UNITS/KG/HR: 10000 INJECTION, SOLUTION INTRAVENOUS at 09:10

## 2019-10-27 RX ADMIN — HYDROCODONE BITARTRATE AND ACETAMINOPHEN 1 TABLET: 5; 325 TABLET ORAL at 08:10

## 2019-10-27 RX ADMIN — METOPROLOL SUCCINATE 100 MG: 100 TABLET, EXTENDED RELEASE ORAL at 08:10

## 2019-10-27 RX ADMIN — FERROUS SULFATE TAB EC 325 MG (65 MG FE EQUIVALENT) 325 MG: 325 (65 FE) TABLET DELAYED RESPONSE at 08:10

## 2019-10-27 NOTE — PROGRESS NOTES
"Progress Note  Hospital Medicine    Provider team: [facility]  Admit Date: 10/26/2019  Encounter Date: 10/27/2019     SUBJECTIVE:     Follow-up Visit for: NSTEMI (non-ST elevated myocardial infarction)    HPI (See H&P for complete P,F,SHx):  Mrs. Romero is a 74F with CAD s/p NSTEMI 07/2019 and CABG x 2 08/12/2019 (KENDY to LAD and SVG to OM), IDDM, HTN, HLD, obesity, KAMRAN on CPAP, CKD who presents for evaluation of non-exertional CP relieved with NTG. Patient states that a few days ago she had a vague "strange sensation" in her chest "over her heart". She didn't think anything of it. On the evening of 10/25 at 2230, while watching TV she had pain associated with her strange feeling. This was the first time she has had chest pain like this since her surgery. She states the pain is similar to her prior MI with radiation down her left arm, which she says is different from her chronic left shoulder pain. She took NTG at home x 3 w/o relief. She recorded her BP during the pain episode and highest recording at home was 193/105. She denies pain with exertion. She denies incisional pain. She denies respiratory or GI symptoms. She has been complaint with her BP medications. She is frustrated by her repeat hospitalizations. She reports a return of her CP symptoms on admission, subsided w/o intervention.     Of note, she was hospitalized again soon after her CABG with urosepsis and discharged to SNF.     ED: BP max 217/85, Tn 0.026, EKG w/o ST/T wave changes.    TTE 8/28/2019  · Increased (hyperdynamic) left ventricular systolic function. The estimated ejection fraction is 75%.  · Concentric left ventricular remodeling.  · No wall motion abnormalities.  · Normal LV diastolic function.  · Normal right ventricular systolic function.  · Normal central venous pressure (3 mm Hg).  · The estimated PA systolic pressure is 17 mm Hg    Interval history:  Troponin trended up yesterday. Patient initiated on ACS protocol. Notified on-call " "cardiology. Interventional consultation in.  Patient without further chest discomfort since yesterday morning. She has some back spasms and requested flexeril; however notified by pharmacy contraindicated in ACS so will avoid. Patient otherwise in good spirits today. All questions answered to patient/satisfaction.    Review of Systems:  Review of Systems   Constitutional: Negative for chills and fever.   HENT: Negative for congestion and sore throat.    Eyes: Negative for photophobia, pain and discharge.   Respiratory: Negative for cough, hemoptysis, sputum production and shortness of breath.    Cardiovascular: Negative for chest pain, palpitations and leg swelling.   Gastrointestinal: Negative for abdominal pain, diarrhea, nausea and vomiting.   Genitourinary: Negative for dysuria and urgency.   Musculoskeletal: Positive for back pain. Negative for myalgias and neck pain.   Skin: Negative for itching and rash.   Neurological: Negative for sensory change, focal weakness and headaches.   Endo/Heme/Allergies: Negative for polydipsia. Does not bruise/bleed easily.   Psychiatric/Behavioral: Negative for depression and suicidal ideas.       OBJECTIVE:   No intake or output data in the 24 hours ending 10/27/19 0957  Vital Signs Range (Last 24H):  Temp:  [97.5 °F (36.4 °C)-98.4 °F (36.9 °C)]   Pulse:  [63-94]   Resp:  [16-18]   BP: (127-167)/(60-87)   SpO2:  [96 %-100 %]   Body mass index is 41.05 kg/m².    Objective:  General Appearance:  Comfortable, well-appearing, in no acute distress and not in pain.    Vital signs: (most recent): Blood pressure 131/60, pulse 67, temperature 98 °F (36.7 °C), temperature source Oral, resp. rate 18, height 5' 2" (1.575 m), weight 101.8 kg (224 lb 6.9 oz), last menstrual period 01/09/2001, SpO2 97 %, not currently breastfeeding.  No fever.    Output: Producing urine and producing stool.    HEENT: Normal HEENT exam.    Lungs:  Normal effort.  Breath sounds clear to auscultation.  She is " not in respiratory distress.  No rales or wheezes.    Heart: Normal rate.  Regular rhythm.  S1 normal and S2 normal.  No murmur.   Chest: Symmetric chest wall expansion.   Abdomen: Abdomen is soft.  Bowel sounds are normal.   There is no abdominal tenderness.     Extremities: Normal range of motion.  There is no deformity, effusion, dependent edema or local swelling.    Pulses: Distal pulses are intact.    Neurological: Patient is alert and oriented to person, place and time.  Normal strength.    Pupils:  Pupils are equal, round, and reactive to light.    Skin:  Warm and dry.      Medications:  Medication list was reviewed in EPIC and changes noted under Assessment/Plan and MAR.    Laboratory:  Recent Labs     10/27/19  0447   WBC 6.29   RBC 3.81*   HGB 10.0*   HCT 33.1*      MCV 87   MCH 26.2*   MCHC 30.2*   GRAN 66.0  4.2   LYMPH 23.2  1.5   MONO 7.6  0.5   EOS 0.2      Recent Labs     10/27/19  0447   *      K 3.6      CO2 24   BUN 14   CREATININE 1.0   CALCIUM 8.8   ANIONGAP 8   MG 1.6   PHOS 3.1       ASSESSMENT/PLAN:     Active Hospital Problems    Diagnosis  POA    *NSTEMI (non-ST elevated myocardial infarction) [I21.4]  Yes    Chest pain [R07.9]  Yes    Coronary artery disease involving coronary bypass graft of native heart with unstable angina pectoris [I25.700]  Yes    Type 2 diabetes mellitus with stage 3 chronic kidney disease and hypertension [E11.22, I12.9, N18.3]  Yes    CKD (chronic kidney disease) stage 3, GFR 30-59 ml/min [N18.3]  Yes    KAMRAN on CPAP [G47.33, Z99.89]  Not Applicable    Morbid obesity with body mass index (BMI) of 40.0 or higher [E66.01]  Yes    Hyperlipidemia [E78.5]  Yes    Essential hypertension [I10]  Yes      Resolved Hospital Problems   No resolved problems to display.      *NSTEMI   Coronary artery disease of bypass graft of native heart with unstable angina pectoris  - non-exertional CP associated with high BP readings at home  - EKG w/o  ischemic changes  - Tn trended up  - ACS protocol initiated with brilinta and IV UFH  - Interventional cardiology consulted  - C/w ACS pathway  - NTG PRN  - optimize BP control  - will f/u ECHO     Type 2 diabetes mellitus with stage 3 chronic kidney disease and hypertension  - hold home detemir and glipizide  - start low dose SSI for now        Lab Results   Component Value Date     HGBA1C 7.5 (H) 08/07/2019         CKD (chronic kidney disease) stage 3, GFR 30-59 ml/min  - stable     KAMRAN on CPAP  - CPAP nightly     Morbid obesity with body mass index (BMI) of 40.0 or higher  - obesity complicates all aspects of disease management from diagnostic modalities to treatment. Weight loss encouraged and health benefits explained to patient.     Hyperlipidemia  - continue statin and ASA     Essential hypertension  - uncontrolled BP on admission and during CP episodes  - continue norvasc 10 mg daily  - continue losartan 100 mg daily  - continue MTP succinate 100 mg daily  - start lopressor IVP PRN  - titrate medications as necessary    Anticipated discharge date and disposition:   Pending cardiology evaluation.  Russel Chavez MD  [facility]

## 2019-10-27 NOTE — NURSING
Rapid RN called to inquire about pts needing assistance. Informed of 3080s elevated Troponin, on Heparin drip, and chest pain this morning relieved by Nitro SL x3. Informed Drs are aware and no new orders were given. States will have upper manager look into pts chart and see if pt can be seen by Juan dominguez. Pts nurse informed.

## 2019-10-27 NOTE — PLAN OF CARE
Treated back pain with prn medication.  No further complains this shift.  PTT remains therapeutic, no changes were made to heparin.  No signs of distress this shift.  No falls or injuries noted this shift.  Cardiac monitoring remains in progress this shift.  Will continue to monitor.

## 2019-10-27 NOTE — NURSING
Rapid RN returned call and states the pts case was discussed and the doctors state the current treatment is correct for pt. States Card will try to see pt or at least look at pts chart tonight. Pts nurse informed.

## 2019-10-28 ENCOUNTER — TELEPHONE (OUTPATIENT)
Dept: CARDIOTHORACIC SURGERY | Facility: CLINIC | Age: 74
End: 2019-10-28

## 2019-10-28 LAB
ABO + RH BLD: NORMAL
ANION GAP SERPL CALC-SCNC: 8 MMOL/L (ref 8–16)
APTT BLDCRRT: 34.5 SEC (ref 21–32)
APTT BLDCRRT: 38.4 SEC (ref 21–32)
ASCENDING AORTA: 2.51 CM
AV INDEX (PROSTH): 0.8
AV MEAN GRADIENT: 10 MMHG
AV PEAK GRADIENT: 17 MMHG
AV VALVE AREA: 2.54 CM2
AV VELOCITY RATIO: 0.64
BASOPHILS # BLD AUTO: 0.03 K/UL (ref 0–0.2)
BASOPHILS NFR BLD: 0.4 % (ref 0–1.9)
BLD GP AB SCN CELLS X3 SERPL QL: NORMAL
BSA FOR ECHO PROCEDURE: 2.11 M2
BUN SERPL-MCNC: 16 MG/DL (ref 8–23)
CALCIUM SERPL-MCNC: 9.1 MG/DL (ref 8.7–10.5)
CHLORIDE SERPL-SCNC: 107 MMOL/L (ref 95–110)
CO2 SERPL-SCNC: 23 MMOL/L (ref 23–29)
CREAT SERPL-MCNC: 1 MG/DL (ref 0.5–1.4)
CV ECHO LV RWT: 0.57 CM
DIFFERENTIAL METHOD: ABNORMAL
DOP CALC AO PEAK VEL: 2.04 M/S
DOP CALC AO VTI: 37.58 CM
DOP CALC LVOT AREA: 3.2 CM2
DOP CALC LVOT DIAMETER: 2.01 CM
DOP CALC LVOT PEAK VEL: 1.31 M/S
DOP CALC LVOT STROKE VOLUME: 95.43 CM3
DOP CALCLVOT PEAK VEL VTI: 30.09 CM
E WAVE DECELERATION TIME: 133.4 MSEC
E/A RATIO: 0.93
E/E' RATIO: 12.67 M/S
ECHO LV POSTERIOR WALL: 0.97 CM (ref 0.6–1.1)
EOSINOPHIL # BLD AUTO: 0.1 K/UL (ref 0–0.5)
EOSINOPHIL NFR BLD: 1.7 % (ref 0–8)
ERYTHROCYTE [DISTWIDTH] IN BLOOD BY AUTOMATED COUNT: 15 % (ref 11.5–14.5)
EST. GFR  (AFRICAN AMERICAN): >60 ML/MIN/1.73 M^2
EST. GFR  (NON AFRICAN AMERICAN): 56.4 ML/MIN/1.73 M^2
FRACTIONAL SHORTENING: 27 % (ref 28–44)
GLUCOSE SERPL-MCNC: 162 MG/DL (ref 70–110)
HCT VFR BLD AUTO: 32.5 % (ref 37–48.5)
HGB BLD-MCNC: 10.1 G/DL (ref 12–16)
IMM GRANULOCYTES # BLD AUTO: 0.03 K/UL (ref 0–0.04)
IMM GRANULOCYTES NFR BLD AUTO: 0.4 % (ref 0–0.5)
INTERVENTRICULAR SEPTUM: 0.83 CM (ref 0.6–1.1)
IVRT: 0.09 MSEC
LA MAJOR: 4.68 CM
LA MINOR: 4.98 CM
LA WIDTH: 3.65 CM
LEFT ATRIUM SIZE: 4.49 CM
LEFT ATRIUM VOLUME INDEX: 33.5 ML/M2
LEFT ATRIUM VOLUME: 67.22 CM3
LEFT INTERNAL DIMENSION IN SYSTOLE: 2.46 CM (ref 2.1–4)
LEFT VENTRICLE DIASTOLIC VOLUME INDEX: 23.29 ML/M2
LEFT VENTRICLE DIASTOLIC VOLUME: 46.71 ML
LEFT VENTRICLE MASS INDEX: 42 G/M2
LEFT VENTRICLE SYSTOLIC VOLUME INDEX: 10.6 ML/M2
LEFT VENTRICLE SYSTOLIC VOLUME: 21.36 ML
LEFT VENTRICULAR INTERNAL DIMENSION IN DIASTOLE: 3.38 CM (ref 3.5–6)
LEFT VENTRICULAR MASS: 84.11 G
LV LATERAL E/E' RATIO: 11.88 M/S
LV SEPTAL E/E' RATIO: 13.57 M/S
LYMPHOCYTES # BLD AUTO: 1.3 K/UL (ref 1–4.8)
LYMPHOCYTES NFR BLD: 18 % (ref 18–48)
MAGNESIUM SERPL-MCNC: 1.8 MG/DL (ref 1.6–2.6)
MCH RBC QN AUTO: 26.5 PG (ref 27–31)
MCHC RBC AUTO-ENTMCNC: 31.1 G/DL (ref 32–36)
MCV RBC AUTO: 85 FL (ref 82–98)
MONOCYTES # BLD AUTO: 0.7 K/UL (ref 0.3–1)
MONOCYTES NFR BLD: 9.4 % (ref 4–15)
MV PEAK A VEL: 1.02 M/S
MV PEAK E VEL: 0.95 M/S
NEUTROPHILS # BLD AUTO: 5.1 K/UL (ref 1.8–7.7)
NEUTROPHILS NFR BLD: 70.1 % (ref 38–73)
NRBC BLD-RTO: 0 /100 WBC
PHOSPHATE SERPL-MCNC: 3.4 MG/DL (ref 2.7–4.5)
PISA TR MAX VEL: 2.68 M/S
PLATELET # BLD AUTO: 189 K/UL (ref 150–350)
PMV BLD AUTO: 10.9 FL (ref 9.2–12.9)
POCT GLUCOSE: 137 MG/DL (ref 70–110)
POCT GLUCOSE: 144 MG/DL (ref 70–110)
POCT GLUCOSE: 64 MG/DL (ref 70–110)
POTASSIUM SERPL-SCNC: 4.3 MMOL/L (ref 3.5–5.1)
PULM VEIN S/D RATIO: 1
PV PEAK D VEL: 0.6 M/S
PV PEAK S VEL: 0.6 M/S
RA MAJOR: 4.99 CM
RA PRESSURE: 3 MMHG
RA WIDTH: 3 CM
RBC # BLD AUTO: 3.81 M/UL (ref 4–5.4)
RIGHT VENTRICULAR END-DIASTOLIC DIMENSION: 3.31 CM
RV TISSUE DOPPLER FREE WALL SYSTOLIC VELOCITY 1 (APICAL 4 CHAMBER VIEW): 9.4 CM/S
SINUS: 2.87 CM
SODIUM SERPL-SCNC: 138 MMOL/L (ref 136–145)
STJ: 2.55 CM
TDI LATERAL: 0.08 M/S
TDI SEPTAL: 0.07 M/S
TDI: 0.08 M/S
TR MAX PG: 29 MMHG
TRICUSPID ANNULAR PLANE SYSTOLIC EXCURSION: 1.46 CM
TV REST PULMONARY ARTERY PRESSURE: 32 MMHG
WBC # BLD AUTO: 7.21 K/UL (ref 3.9–12.7)

## 2019-10-28 PROCEDURE — 63600175 PHARM REV CODE 636 W HCPCS: Performed by: INTERNAL MEDICINE

## 2019-10-28 PROCEDURE — 92928 PR STENT: ICD-10-PCS | Mod: RC,,, | Performed by: INTERNAL MEDICINE

## 2019-10-28 PROCEDURE — 63600175 PHARM REV CODE 636 W HCPCS: Performed by: STUDENT IN AN ORGANIZED HEALTH CARE EDUCATION/TRAINING PROGRAM

## 2019-10-28 PROCEDURE — 85347 COAGULATION TIME ACTIVATED: CPT | Performed by: INTERNAL MEDICINE

## 2019-10-28 PROCEDURE — 99900035 HC TECH TIME PER 15 MIN (STAT)

## 2019-10-28 PROCEDURE — 25000003 PHARM REV CODE 250: Performed by: HOSPITALIST

## 2019-10-28 PROCEDURE — C1894 INTRO/SHEATH, NON-LASER: HCPCS | Performed by: INTERNAL MEDICINE

## 2019-10-28 PROCEDURE — 85730 THROMBOPLASTIN TIME PARTIAL: CPT | Mod: 91

## 2019-10-28 PROCEDURE — 99233 PR SUBSEQUENT HOSPITAL CARE,LEVL III: ICD-10-PCS | Mod: ,,, | Performed by: HOSPITALIST

## 2019-10-28 PROCEDURE — 93010 ELECTROCARDIOGRAM REPORT: CPT | Mod: ,,, | Performed by: INTERNAL MEDICINE

## 2019-10-28 PROCEDURE — 99153 MOD SED SAME PHYS/QHP EA: CPT | Performed by: INTERNAL MEDICINE

## 2019-10-28 PROCEDURE — 80048 BASIC METABOLIC PNL TOTAL CA: CPT

## 2019-10-28 PROCEDURE — 36415 COLL VENOUS BLD VENIPUNCTURE: CPT

## 2019-10-28 PROCEDURE — C1760 CLOSURE DEV, VASC: HCPCS | Performed by: INTERNAL MEDICINE

## 2019-10-28 PROCEDURE — 93005 ELECTROCARDIOGRAM TRACING: CPT

## 2019-10-28 PROCEDURE — C1887 CATHETER, GUIDING: HCPCS | Performed by: INTERNAL MEDICINE

## 2019-10-28 PROCEDURE — 99152 MOD SED SAME PHYS/QHP 5/>YRS: CPT | Performed by: INTERNAL MEDICINE

## 2019-10-28 PROCEDURE — C1874 STENT, COATED/COV W/DEL SYS: HCPCS | Performed by: INTERNAL MEDICINE

## 2019-10-28 PROCEDURE — 86901 BLOOD TYPING SEROLOGIC RH(D): CPT

## 2019-10-28 PROCEDURE — 99233 SBSQ HOSP IP/OBS HIGH 50: CPT | Mod: ,,, | Performed by: HOSPITALIST

## 2019-10-28 PROCEDURE — 93455 CORONARY ART/GRFT ANGIO S&I: CPT | Mod: 59 | Performed by: INTERNAL MEDICINE

## 2019-10-28 PROCEDURE — 25000003 PHARM REV CODE 250: Performed by: PHYSICIAN ASSISTANT

## 2019-10-28 PROCEDURE — 92928 PRQ TCAT PLMT NTRAC ST 1 LES: CPT | Mod: RC,,, | Performed by: INTERNAL MEDICINE

## 2019-10-28 PROCEDURE — 25500020 PHARM REV CODE 255: Performed by: INTERNAL MEDICINE

## 2019-10-28 PROCEDURE — 99152 PR MOD CONSCIOUS SEDATION, SAME PHYS, 5+ YRS, FIRST 15 MIN: ICD-10-PCS | Mod: ,,, | Performed by: INTERNAL MEDICINE

## 2019-10-28 PROCEDURE — C9600 PERC DRUG-EL COR STENT SING: HCPCS | Mod: RC | Performed by: INTERNAL MEDICINE

## 2019-10-28 PROCEDURE — 99152 MOD SED SAME PHYS/QHP 5/>YRS: CPT | Mod: ,,, | Performed by: INTERNAL MEDICINE

## 2019-10-28 PROCEDURE — 84100 ASSAY OF PHOSPHORUS: CPT

## 2019-10-28 PROCEDURE — 27201423 OPTIME MED/SURG SUP & DEVICES STERILE SUPPLY: Performed by: INTERNAL MEDICINE

## 2019-10-28 PROCEDURE — 82962 GLUCOSE BLOOD TEST: CPT

## 2019-10-28 PROCEDURE — 94761 N-INVAS EAR/PLS OXIMETRY MLT: CPT

## 2019-10-28 PROCEDURE — 93455 CORONARY ART/GRFT ANGIO S&I: CPT | Mod: 26,51,59, | Performed by: INTERNAL MEDICINE

## 2019-10-28 PROCEDURE — 25000003 PHARM REV CODE 250: Performed by: INTERNAL MEDICINE

## 2019-10-28 PROCEDURE — 63600175 PHARM REV CODE 636 W HCPCS: Performed by: HOSPITALIST

## 2019-10-28 PROCEDURE — 25000003 PHARM REV CODE 250: Performed by: STUDENT IN AN ORGANIZED HEALTH CARE EDUCATION/TRAINING PROGRAM

## 2019-10-28 PROCEDURE — S0077 INJECTION, CLINDAMYCIN PHOSP: HCPCS | Performed by: INTERNAL MEDICINE

## 2019-10-28 PROCEDURE — C1769 GUIDE WIRE: HCPCS | Performed by: INTERNAL MEDICINE

## 2019-10-28 PROCEDURE — 93010 EKG 12-LEAD: ICD-10-PCS | Mod: ,,, | Performed by: INTERNAL MEDICINE

## 2019-10-28 PROCEDURE — 20600001 HC STEP DOWN PRIVATE ROOM

## 2019-10-28 PROCEDURE — 83735 ASSAY OF MAGNESIUM: CPT

## 2019-10-28 PROCEDURE — 85025 COMPLETE CBC W/AUTO DIFF WBC: CPT | Mod: 91

## 2019-10-28 PROCEDURE — 93455 PR CATH PLACE/CORONARY ANGIO, IMG SUPER/INTERP, BYPASS ANGIO: ICD-10-PCS | Mod: 26,51,59, | Performed by: INTERNAL MEDICINE

## 2019-10-28 DEVICE — STENT RONYX35012UX RESOLUTE ONYX 3.50X12
Type: IMPLANTABLE DEVICE | Site: CORONARY | Status: FUNCTIONAL
Brand: RESOLUTE ONYX™

## 2019-10-28 DEVICE — STENT RONYX35008UX RESOLUTE ONYX 3.50X08
Type: IMPLANTABLE DEVICE | Site: CORONARY | Status: FUNCTIONAL
Brand: RESOLUTE ONYX™

## 2019-10-28 RX ORDER — LIDOCAINE HYDROCHLORIDE 20 MG/ML
INJECTION, SOLUTION INFILTRATION; PERINEURAL
Status: DISCONTINUED | OUTPATIENT
Start: 2019-10-28 | End: 2019-10-28 | Stop reason: HOSPADM

## 2019-10-28 RX ORDER — HEPARIN SODIUM 200 [USP'U]/100ML
INJECTION, SOLUTION INTRAVENOUS
Status: DISCONTINUED | OUTPATIENT
Start: 2019-10-28 | End: 2019-10-29 | Stop reason: HOSPADM

## 2019-10-28 RX ORDER — DIPHENHYDRAMINE HCL 50 MG
50 CAPSULE ORAL
Status: DISCONTINUED | OUTPATIENT
Start: 2019-10-28 | End: 2019-10-28 | Stop reason: HOSPADM

## 2019-10-28 RX ORDER — PROTAMINE SULFATE 10 MG/ML
INJECTION, SOLUTION INTRAVENOUS
Status: DISCONTINUED | OUTPATIENT
Start: 2019-10-28 | End: 2019-10-28 | Stop reason: HOSPADM

## 2019-10-28 RX ORDER — SODIUM CHLORIDE 9 MG/ML
INJECTION, SOLUTION INTRAVENOUS ONCE
Status: COMPLETED | OUTPATIENT
Start: 2019-10-28 | End: 2019-10-28

## 2019-10-28 RX ORDER — SODIUM CHLORIDE 0.9 % (FLUSH) 0.9 %
10 SYRINGE (ML) INJECTION
Status: DISCONTINUED | OUTPATIENT
Start: 2019-10-28 | End: 2019-10-29 | Stop reason: HOSPADM

## 2019-10-28 RX ORDER — HEPARIN SODIUM 1000 [USP'U]/ML
INJECTION, SOLUTION INTRAVENOUS; SUBCUTANEOUS
Status: DISCONTINUED | OUTPATIENT
Start: 2019-10-28 | End: 2019-10-28 | Stop reason: HOSPADM

## 2019-10-28 RX ORDER — CYCLOBENZAPRINE HCL 10 MG
10 TABLET ORAL 3 TIMES DAILY PRN
Status: DISCONTINUED | OUTPATIENT
Start: 2019-10-28 | End: 2019-10-28

## 2019-10-28 RX ORDER — METHOCARBAMOL 500 MG/1
500 TABLET, FILM COATED ORAL EVERY 8 HOURS PRN
Status: DISCONTINUED | OUTPATIENT
Start: 2019-10-28 | End: 2019-10-29 | Stop reason: HOSPADM

## 2019-10-28 RX ORDER — CLINDAMYCIN PHOSPHATE 900 MG/50ML
INJECTION, SOLUTION INTRAVENOUS
Status: DISCONTINUED | OUTPATIENT
Start: 2019-10-28 | End: 2019-10-29 | Stop reason: HOSPADM

## 2019-10-28 RX ORDER — MIDAZOLAM HYDROCHLORIDE 1 MG/ML
INJECTION, SOLUTION INTRAMUSCULAR; INTRAVENOUS
Status: DISCONTINUED | OUTPATIENT
Start: 2019-10-28 | End: 2019-10-28 | Stop reason: HOSPADM

## 2019-10-28 RX ORDER — FENTANYL CITRATE 50 UG/ML
INJECTION, SOLUTION INTRAMUSCULAR; INTRAVENOUS
Status: DISCONTINUED | OUTPATIENT
Start: 2019-10-28 | End: 2019-10-28 | Stop reason: HOSPADM

## 2019-10-28 RX ORDER — SODIUM CHLORIDE 9 MG/ML
20 INJECTION, SOLUTION INTRAVENOUS CONTINUOUS
Status: ACTIVE | OUTPATIENT
Start: 2019-10-28 | End: 2019-10-28

## 2019-10-28 RX ADMIN — FERROUS SULFATE TAB EC 325 MG (65 MG FE EQUIVALENT) 325 MG: 325 (65 FE) TABLET DELAYED RESPONSE at 09:10

## 2019-10-28 RX ADMIN — HYDROCODONE BITARTRATE AND ACETAMINOPHEN 1 TABLET: 5; 325 TABLET ORAL at 10:10

## 2019-10-28 RX ADMIN — TICAGRELOR 90 MG: 90 TABLET ORAL at 09:10

## 2019-10-28 RX ADMIN — METHOCARBAMOL TABLETS 500 MG: 500 TABLET, COATED ORAL at 11:10

## 2019-10-28 RX ADMIN — HEPARIN SODIUM 14 UNITS/KG/HR: 10000 INJECTION, SOLUTION INTRAVENOUS at 02:10

## 2019-10-28 RX ADMIN — DIPHENHYDRAMINE HYDROCHLORIDE 50 MG: 50 CAPSULE ORAL at 02:10

## 2019-10-28 RX ADMIN — METOPROLOL SUCCINATE 100 MG: 100 TABLET, EXTENDED RELEASE ORAL at 09:10

## 2019-10-28 RX ADMIN — METHOCARBAMOL TABLETS 500 MG: 500 TABLET, COATED ORAL at 09:10

## 2019-10-28 RX ADMIN — SODIUM CHLORIDE: 0.9 INJECTION, SOLUTION INTRAVENOUS at 02:10

## 2019-10-28 RX ADMIN — HYDROCODONE BITARTRATE AND ACETAMINOPHEN 1 TABLET: 5; 325 TABLET ORAL at 09:10

## 2019-10-28 RX ADMIN — CYCLOBENZAPRINE 10 MG: 10 TABLET, FILM COATED ORAL at 06:10

## 2019-10-28 RX ADMIN — AMLODIPINE BESYLATE 10 MG: 10 TABLET ORAL at 09:10

## 2019-10-28 RX ADMIN — SODIUM CHLORIDE 2032 ML: 0.9 INJECTION, SOLUTION INTRAVENOUS at 06:10

## 2019-10-28 RX ADMIN — LOSARTAN POTASSIUM 100 MG: 50 TABLET, FILM COATED ORAL at 09:10

## 2019-10-28 RX ADMIN — ATORVASTATIN CALCIUM 80 MG: 20 TABLET, FILM COATED ORAL at 09:10

## 2019-10-28 RX ADMIN — ASPIRIN 81 MG CHEWABLE TABLET 81 MG: 81 TABLET CHEWABLE at 09:10

## 2019-10-28 NOTE — PROGRESS NOTES
"Progress Note  Hospital Medicine    Provider team:    [facility] [place] [facility]  [facility] CARDIOLOGY TEAM A - CARDIOLOGY CONSULT STEPDOWN  Admit Date: 10/26/2019  Encounter Date: 10/28/2019     SUBJECTIVE:     Follow-up Visit for: NSTEMI (non-ST elevated myocardial infarction)    HPI (See H&P for complete P,F,SHx):  Mrs. Romero is a 74F with CAD s/p NSTEMI 07/2019 and CABG x 2 08/12/2019 (KENDY to LAD and SVG to OM), IDDM, HTN, HLD, obesity, KAMRAN on CPAP, CKD who presents for evaluation of non-exertional CP relieved with NTG. Patient states that a few days ago she had a vague "strange sensation" in her chest "over her heart". She didn't think anything of it. On the evening of 10/25 at 2230, while watching TV she had pain associated with her strange feeling. This was the first time she has had chest pain like this since her surgery. She states the pain is similar to her prior MI with radiation down her left arm, which she says is different from her chronic left shoulder pain. She took NTG at home x 3 w/o relief. She recorded her BP during the pain episode and highest recording at home was 193/105. She denies pain with exertion. She denies incisional pain. She denies respiratory or GI symptoms. She has been complaint with her BP medications. She is frustrated by her repeat hospitalizations. She reports a return of her CP symptoms on admission, subsided w/o intervention.     Of note, she was hospitalized again soon after her CABG with urosepsis and discharged to SNF.     ED: BP max 217/85, Tn 0.026, EKG w/o ST/T wave changes.    TTE 8/28/2019  · Increased (hyperdynamic) left ventricular systolic function. The estimated ejection fraction is 75%.  · Concentric left ventricular remodeling.  · No wall motion abnormalities.  · Normal LV diastolic function.  · Normal right ventricular systolic function.  · Normal central venous pressure (3 mm Hg).  · The estimated PA systolic pressure is 17 mm Hg    Interval history:  Troponin trended up " "yesterday. Patient initiated on ACS protocol. Notified on-call cardiology. Interventional consultation in.  Patient without further chest discomfort since 10/26 morning. She has some back spasms and requested flexeril; however notified by pharmacy contraindicated in ACS so will avoid and now placed in order for methocarbamol. Patient otherwise in good spirits today. All questions answered to patient/satisfaction.    Review of Systems:  Review of Systems   Constitutional: Negative for chills and fever.   HENT: Negative for congestion and sore throat.    Eyes: Negative for photophobia, pain and discharge.   Respiratory: Negative for cough, hemoptysis, sputum production and shortness of breath.    Cardiovascular: Negative for chest pain, palpitations and leg swelling.   Gastrointestinal: Negative for abdominal pain, diarrhea, nausea and vomiting.   Genitourinary: Negative for dysuria and urgency.   Musculoskeletal: Positive for back pain. Negative for myalgias and neck pain.   Skin: Negative for itching and rash.   Neurological: Negative for sensory change, focal weakness and headaches.   Endo/Heme/Allergies: Negative for polydipsia. Does not bruise/bleed easily.   Psychiatric/Behavioral: Negative for depression and suicidal ideas.       OBJECTIVE:       Intake/Output Summary (Last 24 hours) at 10/28/2019 0911  Last data filed at 10/27/2019 2200  Gross per 24 hour   Intake 340 ml   Output --   Net 340 ml     Vital Signs Range (Last 24H):  Temp:  [97.5 °F (36.4 °C)-98.5 °F (36.9 °C)]   Pulse:  [67-90]   Resp:  [15-18]   BP: (105-177)/(64-92)   SpO2:  [91 %-99 %]   Body mass index is 40.97 kg/m².    Objective:  General Appearance:  Comfortable, well-appearing, in no acute distress and not in pain.    Vital signs: (most recent): Blood pressure (!) 147/81, pulse 90, temperature 97.7 °F (36.5 °C), temperature source Axillary, resp. rate 16, height 5' 2" (1.575 m), weight 101.6 kg (224 lb), last menstrual period 01/09/2001, " SpO2 (!) 91 %, not currently breastfeeding.  No fever.    Output: Producing urine and producing stool.    HEENT: Normal HEENT exam.    Lungs:  Normal effort.  Breath sounds clear to auscultation.  She is not in respiratory distress.  No rales or wheezes.    Heart: Normal rate.  Regular rhythm.  S1 normal and S2 normal.  No murmur.   Chest: Symmetric chest wall expansion.   Abdomen: Abdomen is soft.  Bowel sounds are normal.   There is no abdominal tenderness.     Extremities: Normal range of motion.  There is no deformity, effusion, dependent edema or local swelling.    Pulses: Distal pulses are intact.    Neurological: Patient is alert and oriented to person, place and time.  Normal strength.    Pupils:  Pupils are equal, round, and reactive to light.    Skin:  Warm and dry.      Medications:  Medication list was reviewed in EPIC and changes noted under Assessment/Plan and MAR.    Laboratory:  Recent Labs     10/28/19  0602   WBC 7.21   RBC 3.81*   HGB 10.1*   HCT 32.5*      MCV 85   MCH 26.5*   MCHC 31.1*   GRAN 70.1  5.1   LYMPH 18.0  1.3   MONO 9.4  0.7   EOS 0.1      Recent Labs     10/28/19  0602   *      K 4.3      CO2 23   BUN 16   CREATININE 1.0   CALCIUM 9.1   ANIONGAP 8   MG 1.8   PHOS 3.4       ASSESSMENT/PLAN:     Active Hospital Problems    Diagnosis  POA    *NSTEMI (non-ST elevated myocardial infarction) [I21.4]  Yes    Chest pain [R07.9]  Yes    Coronary artery disease involving coronary bypass graft of native heart with unstable angina pectoris [I25.700]  Yes    Type 2 diabetes mellitus with stage 3 chronic kidney disease and hypertension [E11.22, I12.9, N18.3]  Yes    CKD (chronic kidney disease) stage 3, GFR 30-59 ml/min [N18.3]  Yes    KAMRAN on CPAP [G47.33, Z99.89]  Not Applicable    Morbid obesity with body mass index (BMI) of 40.0 or higher [E66.01]  Yes    Hyperlipidemia [E78.5]  Yes    Essential hypertension [I10]  Yes      Resolved Hospital Problems   No  resolved problems to display.      *NSTEMI   Coronary artery disease of bypass graft of native heart with unstable angina pectoris  - non-exertional CP associated with high BP readings at home  - EKG w/o ischemic changes  - Tn trended up  - ACS protocol initiated with brilinta and IV UFH  - Interventional cardiology consulted for [facility]  - C/w ACS pathway  - NTG PRN  - optimize BP control  - will f/u ECHO     Type 2 diabetes mellitus with stage 3 chronic kidney disease and hypertension  - hold home detemir and glipizide  - start low dose SSI for now        Lab Results   Component Value Date     HGBA1C 7.5 (H) 08/07/2019         CKD (chronic kidney disease) stage 3, GFR 30-59 ml/min  - stable     KAMRAN on CPAP  - CPAP nightly     Morbid obesity with body mass index (BMI) of 40.0 or higher  - obesity complicates all aspects of disease management from diagnostic modalities to treatment. Weight loss encouraged and health benefits explained to patient.     Hyperlipidemia  - continue statin and ASA     Essential hypertension  - uncontrolled BP on admission and during CP episodes  - continue norvasc 10 mg daily  - continue losartan 100 mg daily  - continue MTP succinate 100 mg daily  - start lopressor IVP PRN  - titrate medications as necessary    Anticipated discharge date and disposition:   Pending cardiology evaluation.  Russel Chavez MD  [facility]

## 2019-10-28 NOTE — PLAN OF CARE
CM at bedside to see patient .  Patient has 4 steps to entrance of home.  Independent with ADL and does not use medical equipment.   Explained CM team continuing to follow the patient throughout the hospital admission for discharge needs and assistance.  CM name, phone # and anticipated D/C date updated on whiteboard.       10/28/19 2899   Discharge Assessment   Assessment Type Discharge Planning Assessment   Confirmed/corrected address and phone number on facesheet? Yes   Assessment information obtained from? Patient   Expected Length of Stay (days) 4   Prior to hospitilization cognitive status: Alert/Oriented   Prior to hospitalization functional status: Independent   Current cognitive status: Alert/Oriented   Current Functional Status: Independent   Facility Arrived From: home   Lives With spouse   Able to Return to Prior Arrangements yes   Is patient able to care for self after discharge? Yes   Readmission Within the Last 30 Days no previous admission in last 30 days   Patient currently being followed by outpatient case management? No   Patient currently receives any other outside agency services? No   Equipment Currently Used at Home glucometer   Do you have any problems affording any of your prescribed medications? No   Is the patient taking medications as prescribed? yes   Does the patient have transportation home? Yes   Transportation Anticipated family or friend will provide   Dialysis Name and Scheduled days n/a   Does the patient receive services at the [facility]madin Clinic? No   Discharge Plan A Home with family   Discharge Plan B Home with family   DME Needed Upon Discharge    (TBD)   Patient/Family in Agreement with Plan yes     Liz Roberts MD  [address] [facility] / [facility] [address]    [id number] [address] AT [facility] OF [address] [address]  Phone: [phone] Fax: [phone]    Extended  Emergency Contact Information  Primary Emergency Contact: Teddy Romero  Address: [address] [place], [place] [id number] [place] [address] of [place]  Home Phone: [phone]  Mobile Phone: [phone]  Relation: Spouse  Secondary Emergency Contact: Tobias Romero  Mobile Phone: [phone]  Relation: Son

## 2019-10-28 NOTE — HPI
"Mrs. Romero is a 74F with CAD s/p NSTEMI 07/2019 and CABG x 2 08/12/2019 (KENDY to LAD and SVG to OM), IDDM, HTN, HLD, obesity, KAMRAN on CPAP, CKD who presents for evaluation of non-exertional CP relieved with NTG. Patient states that a few days ago she had a vague "strange sensation" in her chest "over her heart". She didn't think anything of it. On the evening of 10/25 at 2230, while watching TV she had pain associated with her strange feeling. This was the first time she has had chest pain like this since her surgery. She states the pain is similar to her prior MI with radiation down her left arm, which she says is different from her chronic left shoulder pain. She took NTG at home x 3 w/o relief. She recorded her BP during the pain episode and highest recording at home was 193/105. She denies pain with exertion. She denies incisional pain. She denies respiratory or GI symptoms. She has been complaint with her BP medications. She is frustrated by her repeat hospitalizations. She reports a return of her CP symptoms on admission, subsided w/o intervention.     Of note, she was hospitalized again soon after her CABG with urosepsis and discharged to SNF.     ED: BP max 217/85, Tn 0.026, EKG w/o ST/T wave changes.     TTE 8/28/2019  · Increased (hyperdynamic) left ventricular systolic function. The estimated ejection fraction is 75%.  · Concentric left ventricular remodeling.  · No wall motion abnormalities.  · Normal LV diastolic function.  · Normal right ventricular systolic function.  · Normal central venous pressure (3 mm Hg).  · The estimated PA systolic pressure is 17 mm Hg  "

## 2019-10-28 NOTE — ASSESSMENT & PLAN NOTE
- Patient seen and examined. Elevated Troponin to 4.7 in setting of known CAD s/p CABG unable to bypass RCA/PDA. Normal EF  - MARCELO score 6  -CRUZ risk score low  - Agree with treatment with DAPT, high dose statin and Heparin ggt  - c/w BB and add ISMN if patient has chest pain  - Plan for LHC +/- PCI today, R CFA Access  -The risks, benefits & alternatives of the procedure were explained to the patient.    -The risks of coronary angiography include but are not limited to:  Bleeding, infection, heart rhythm abnormalities, allergic reactions, kidney injury, stroke and death.    -Should stenting be indicated, the patient has agreed to dual anti-platelet therapy for 1-consecutive year with a drug-eluting stent and a minimum of 1-month with the use of a bare metal stent.    -The risks of moderate sedation include hypotension, respiratory depression, arrhythmias, bronchospasm, & death.    -Informed consent was obtained & the patient is agreeable to proceed with the procedure.

## 2019-10-28 NOTE — CONSULTS
"[facility]  Interventional Cardiology  Consult Note    Patient Name: Kaylee Romero  MRN: [id number]  Admission Date: 10/26/2019  Hospital Length of Stay: 0 days  Code Status: Full Code   Attending Provider: Russel Chavez MD  Consulting Provider: Angela Watts MD  Primary Care Physician: Liz Roberts MD  Principal Problem:NSTEMI (non-ST elevated myocardial infarction)    Patient information was obtained from patient and ER records.     Inpatient consult to Interventional Cardiology  Consult performed by: Angela Watts MD  Consult ordered by: Chung Wells PA-C        Subjective:     Chief Complaint:  NSTEMI     HPI:  Mrs. Romero is a 74F with CAD s/p NSTEMI 07/2019 and CABG x 2 08/12/2019 (KENDY to LAD and SVG to OM), IDDM, HTN, HLD, obesity, KAMRAN on CPAP, CKD who presents for evaluation of non-exertional CP relieved with NTG. Patient states that a few days ago she had a vague "strange sensation" in her chest "over her heart". She didn't think anything of it. On the evening of 10/25 at 2230, while watching TV she had pain associated with her strange feeling. This was the first time she has had chest pain like this since her surgery. She states the pain is similar to her prior MI with radiation down her left arm, which she says is different from her chronic left shoulder pain. She took NTG at home x 3 w/o relief. She recorded her BP during the pain episode and highest recording at home was 193/105. She denies pain with exertion. She denies incisional pain. She denies respiratory or GI symptoms. She has been complaint with her BP medications. She is frustrated by her repeat hospitalizations. She reports a return of her CP symptoms on admission, subsided w/o intervention.     Of note, she was hospitalized again soon after her CABG with urosepsis and discharged to SNF.     ED: BP max 217/85, Tn 0.026, EKG w/o ST/T wave changes.     TTE 8/28/2019  · Increased (hyperdynamic) " left ventricular systolic function. The estimated ejection fraction is 75%.  · Concentric left ventricular remodeling.  · No wall motion abnormalities.  · Normal LV diastolic function.  · Normal right ventricular systolic function.  · Normal central venous pressure (3 mm Hg).  The estimated PA systolic pressure is 17 mm Hg    Past Medical History:   Diagnosis Date    Acid reflux     Age-related osteoporosis without current pathological fracture 2019    Cataract     CKD (chronic kidney disease) stage 3, GFR 30-59 ml/min     Dry mouth     History of TIA (transient ischemic attack) 2018    Hyperlipidemia 2014    Hypertensive heart disease with diastolic heart failure 2012    Morbid obesity with body mass index (BMI) of 40.0 or higher 2016    KAMRAN (obstructive sleep apnea)     KAMRAN on CPAP 2016    Osteoporosis without current pathological fracture 2018    Physical deconditioning 2018    Status post total left knee replacement 2017    Type 2 diabetes mellitus with stage 3 chronic kidney disease, without long-term current use of insulin 2019       Past Surgical History:   Procedure Laterality Date    ankle surgery (l)      APPENDECTOMY       SECTION      CORONARY ARTERY BYPASS GRAFT  09/2019    x 2    CORONARY ARTERY BYPASS GRAFT (CABG) N/A 2019    Procedure: CORONARY ARTERY BYPASS GRAFT (CABG)X3;  Surgeon: Peterson Ventura MD;  Location: [facility] OR [address] [place] [address];  Service: Cardiovascular;  Laterality: N/A;    DILATION AND CURETTAGE OF UTERUS      ENDOSCOPIC HARVEST OF VEIN Left 2019    Procedure: SURGICAL PROCUREMENT, VEIN, ENDOSCOPIC;  Surgeon: Peterson Ventura MD;  Location: [facility] OR [address] [place] [address];  Service: Cardiovascular;  Laterality: Left;  Vein [place] Start: 843; End: 105   Vein Prep Start: 105; End: 1145    KNEE ARTHROSCOPY W/ DEBRIDEMENT      KNEE SURGERY Left 2017    TKR    LEFT HEART CATHETERIZATION Left  7/9/2019    Procedure: Left heart cath;  Surgeon: Ronak Gibbons MD;  Location: [facility] CATH LAB;  Service: Cardiology;  Laterality: Left;       Review of patient's allergies indicates:   Allergen Reactions    Bactrim [sulfamethoxazole-trimethoprim] Other (See Comments)     Generic version  Sulfa makes her sick    Keflex [cephalexin] Other (See Comments)     Turns orange       PTA Medications   Medication Sig    alcohol swabs (ALCOHOL PADS) PadM Apply 1 each topically as needed.    alendronate (FOSAMAX) 70 MG tablet Take 1 tablet (70 mg total) by mouth every 7 days. Take with a full glass of water & remain upright for at least 30 minutes    amLODIPine (NORVASC) 10 MG tablet Take 1 tablet (10 mg total) by mouth once daily.    aspirin (ECOTRIN) 325 MG EC tablet Take 1 tablet (325 mg total) by mouth once daily.    atorvastatin (LIPITOR) 80 MG tablet Take 1 tablet (80 mg total) by mouth once daily.    blood sugar diagnostic Strp 1 strip by Misc.(Non-Drug; Combo Route) route once daily.    diclofenac sodium (VOLTAREN) 1 % Gel Apply 2 g topically 4 (four) times daily.    ferrous sulfate (FEOSOL) 325 mg (65 mg iron) Tab tablet Take 1 tablet (325 mg total) by mouth once daily.    flu vacc [id number],65yr up,PF (FLUZONE HIGH-DOSE 2019-20, PF,) 180 mcg/0.5 mL Syrg inject 0.5 ml into the muscle for one dose    glipiZIDE (GLUCOTROL) 10 MG tablet Take 2 tablets (20 mg total) by mouth daily with breakfast.    insulin detemir U-100 (LEVEMIR FLEXTOUCH) 100 unit/mL (3 mL) SubQ InPn pen Inject 15 Units into the skin every evening.    lancets Misc 1 lancet by Misc.(Non-Drug; Combo Route) route once daily.    losartan (COZAAR) 100 MG tablet Take 1 tablet (100 mg total) by mouth once daily.    metoprolol succinate (TOPROL-XL) 100 MG 24 hr tablet Take 1 tablet (100 mg total) by mouth once daily.    multivitamin (ONE DAILY MULTIVITAMIN) per tablet Take 1 tablet by mouth once daily.    pen needle, diabetic 31 gauge x  "5/16" Ndle Use every evening.     Family History     Problem Relation (Age of Onset)    Diabetes Father    Heart disease Mother, Father    Heart failure Mother, Father    No Known Problems Brother, Son    Stroke Father, Paternal Grandfather        Tobacco Use    Smoking status: Never Smoker    Smokeless tobacco: Never Used   Substance and Sexual Activity    Alcohol use: Yes     Alcohol/week: 0.0 standard drinks     Comment: social drinker    Drug use: No    Sexual activity: Yes     Partners: Male     Birth control/protection: Post-menopausal     Review of Systems   Constitution: Negative for chills, decreased appetite and diaphoresis.   HENT: Negative for congestion and ear discharge.    Eyes: Negative for blurred vision and discharge.   Cardiovascular: Negative for chest pain, dyspnea on exertion, irregular heartbeat, leg swelling and paroxysmal nocturnal dyspnea.   Respiratory: Negative for cough, hemoptysis and shortness of breath.    Gastrointestinal: Negative for abdominal pain.     Objective:     Vital Signs (Most Recent):  Temp: 97.9 °F (36.6 °C) (10/28/19 0458)  Pulse: 80 (10/28/19 0458)  Resp: 18 (10/28/19 0458)  BP: (!) 177/86 (10/28/19 0458)  SpO2: 97 % (10/28/19 0458) Vital Signs (24h Range):  Temp:  [97.5 °F (36.4 °C)-98.5 °F (36.9 °C)] 97.9 °F (36.6 °C)  Pulse:  [63-86] 80  Resp:  [15-18] 18  SpO2:  [92 %-99 %] 97 %  BP: (105-177)/(60-92) 177/86     Weight: 101.6 kg (224 lb)  Body mass index is 40.97 kg/m².    SpO2: 97 %  O2 Device (Oxygen Therapy): CPAP      Intake/Output Summary (Last 24 hours) at 10/28/2019 0649  Last data filed at 10/27/2019 2200  Gross per 24 hour   Intake 340 ml   Output --   Net 340 ml       Lines/Drains/Airways     Peripheral Intravenous Line                 Peripheral IV - Single Lumen 10/26/19 0054 18 G Right Antecubital 2 days                Physical Exam   Constitutional: She is oriented to person, place, and time. She appears well-developed and well-nourished. No " distress.   Eyes: Pupils are equal, round, and reactive to light. Conjunctivae are normal.   Neck: No tracheal deviation present. No thyromegaly present.   Cardiovascular: Normal rate, regular rhythm, normal heart sounds and intact distal pulses. Exam reveals no gallop and no friction rub.   No murmur heard.  Pulses:       Radial pulses are 2+ on the right side, and 2+ on the left side.        Femoral pulses are 2+ on the right side, and 2+ on the left side.  Well healed sternotomy   Pulmonary/Chest: Effort normal and breath sounds normal. No respiratory distress. She has no wheezes. She has no rales.   Abdominal: Soft. Bowel sounds are normal. She exhibits no distension. There is no tenderness.   Musculoskeletal: She exhibits no edema or deformity.   Neurological: She is alert and oriented to person, place, and time. No cranial nerve deficit. Coordination normal.   Skin: Skin is warm and dry. She is not diaphoretic.   Psychiatric: She has a normal mood and affect. Her behavior is normal.       Significant Labs:   BMP:   Recent Labs   Lab 10/27/19  0447   *      K 3.6      CO2 24   BUN 14   CREATININE 1.0   CALCIUM 8.8   MG 1.6       Significant Imaging: Echocardiogram: 2D echo with color flow doppler: No results found for this or any previous visit.    Assessment and Plan:     * NSTEMI (non-ST elevated myocardial infarction)  - Patient seen and examined. Elevated Troponin to 4.7 in setting of known CAD s/p CABG unable to bypass RCA/PDA. Normal EF  - MARCELO score 6  -CRUZ risk score low  - Agree with treatment with DAPT, high dose statin and Heparin ggt  - c/w BB and add ISMN if patient has chest pain  - Plan for LHC +/- PCI today, R CFA Access  -The risks, benefits & alternatives of the procedure were explained to the patient.    -The risks of coronary angiography include but are not limited to:  Bleeding, infection, heart rhythm abnormalities, allergic reactions, kidney injury, stroke and death.     -Should stenting be indicated, the patient has agreed to dual anti-platelet therapy for 1-consecutive year with a drug-eluting stent and a minimum of 1-month with the use of a bare metal stent.    -The risks of moderate sedation include hypotension, respiratory depression, arrhythmias, bronchospasm, & death.    -Informed consent was obtained & the patient is agreeable to proceed with the procedure.            VTE Risk Mitigation (From admission, onward)         Ordered     heparin 25,000 units in dextrose 5% 250 mL (100 units/mL) infusion LOW INTENSITY nomogram - [facility]  Continuous      10/26/19 1103     heparin 25,000 units in dextrose 5% (100 units/ml) IV bolus from bag - ADDITIONAL PRN BOLUS - 60 units/kg (max bolus 4000 units)  As needed (PRN)      10/26/19 1103     heparin 25,000 units in dextrose 5% (100 units/ml) IV bolus from bag - ADDITIONAL PRN BOLUS - 30 units/kg (max bolus 4000 units)  As needed (PRN)      10/26/19 1103     IP VTE HIGH RISK PATIENT  Once      10/26/19 0255     Place BARBARA hose  Until discontinued      10/26/19 0255     Place sequential compression device  Until discontinued      10/26/19 0255                Thank you for your consult. I will follow-up with patient. Please contact us if you have any additional questions.    Angela Watts MD  Interventional Cardiology   [facility]

## 2019-10-28 NOTE — PLAN OF CARE
Pt resting in bed with eyes closed and cpap in place. Npo status maintained. Call light within reach.

## 2019-10-28 NOTE — CONSULTS
Thanks for the consult. Ms. Kaylee Romero was discussed with Dr. Granado and interventional. Please consult cardiology if needed.

## 2019-10-28 NOTE — NURSING
Called to patients room regarding uncomfortable bed, spasm in leg and medications. Bed swapped out, call placed to on call for muscle relaxer and order received for one time dose of flexeril. Informed patient not to take home medications. Patient states if she needs them she will take them

## 2019-10-28 NOTE — TELEPHONE ENCOUNTER
Pt called to report she's been inpt since Friday and would like to speak to someone from the CTS team. Called Pt back and informed her that ANDREW Pedraza and  was notified. Pt confirmed.

## 2019-10-28 NOTE — PROCEDURES
POST CATH NOTE  Procedure:  [facility] PCI  Referring MD:  Kathy   Indication:  NSTEMI   Access:  R CFA    Findings:  90% PRCA, 80% mLAD, 70% OM1 stenosis  Competitive flow in OMB and mLAD  Patent SVG to OM graft with distal disease  Patent but small LIMA to LAD      Intervention:  PCI BEA X 2 to pRCA and mLAD    Post Cath Exam:  Vitals:    10/28/19 1515   BP:    Pulse: 97   Resp:    Temp:        Patient tolerated the procedure well, no complications  No unusual pain, hematoma or thrill at vascular access site.  Distal pulse present without signs of ischemia.  Post-procedure orders as per EPIC    Recommendation:  - Resume care by primary team   - Perclosed groin flat for 2 hrs  - If symptoms in future will consider OM intervention  -Routine Post cath care  -Bedrest for 2 hours  -Resume Cardiac diet  - IVFs NS @ 150 cc for 4 hrs  -continue dual antiplatelet therapy daily uninterrupted for at least one year  -high-potency statin, ACE-I and BB therapy    Angela Alejo MD (Ashish)  Cardiology Fellow  PGY 5  [facility]

## 2019-10-28 NOTE — SUBJECTIVE & OBJECTIVE
Past Medical History:   Diagnosis Date    Acid reflux     Age-related osteoporosis without current pathological fracture 2019    Cataract     CKD (chronic kidney disease) stage 3, GFR 30-59 ml/min     Dry mouth     History of TIA (transient ischemic attack) 2018    Hyperlipidemia 2014    Hypertensive heart disease with diastolic heart failure 2012    Morbid obesity with body mass index (BMI) of 40.0 or higher 2016    KAMRAN (obstructive sleep apnea)     KAMRAN on CPAP 2016    Osteoporosis without current pathological fracture 2018    Physical deconditioning 2018    Status post total left knee replacement 2017    Type 2 diabetes mellitus with stage 3 chronic kidney disease, without long-term current use of insulin 2019       Past Surgical History:   Procedure Laterality Date    ankle surgery (l)      APPENDECTOMY       SECTION      CORONARY ARTERY BYPASS GRAFT  09/2019    x 2    CORONARY ARTERY BYPASS GRAFT (CABG) N/A 2019    Procedure: CORONARY ARTERY BYPASS GRAFT (CABG)X3;  Surgeon: Peterson Ventura MD;  Location: [facility] OR [address] [place] [address];  Service: Cardiovascular;  Laterality: N/A;    DILATION AND CURETTAGE OF UTERUS      ENDOSCOPIC HARVEST OF VEIN Left 2019    Procedure: SURGICAL PROCUREMENT, VEIN, ENDOSCOPIC;  Surgeon: Peterson Ventura MD;  Location: [facility] OR [address] [place] [address];  Service: Cardiovascular;  Laterality: Left;  Vein [place] Start: 08; End: 1054   Vein Prep Start: 1055; End: 1145    KNEE ARTHROSCOPY W/ DEBRIDEMENT      KNEE SURGERY Left 2017    TKR    LEFT HEART CATHETERIZATION Left 2019    Procedure: Left heart cath;  Surgeon: Ronak Gibbons MD;  Location: [facility] CATH LAB;  Service: Cardiology;  Laterality: Left;       Review of patient's allergies indicates:   Allergen Reactions    Bactrim [sulfamethoxazole-trimethoprim] Other (See Comments)     Generic version  Sulfa makes her sick    Keflex  "[cephalexin] Other (See Comments)     Turns orange       PTA Medications   Medication Sig    alcohol swabs (ALCOHOL PADS) PadM Apply 1 each topically as needed.    alendronate (FOSAMAX) 70 MG tablet Take 1 tablet (70 mg total) by mouth every 7 days. Take with a full glass of water & remain upright for at least 30 minutes    amLODIPine (NORVASC) 10 MG tablet Take 1 tablet (10 mg total) by mouth once daily.    aspirin (ECOTRIN) 325 MG EC tablet Take 1 tablet (325 mg total) by mouth once daily.    atorvastatin (LIPITOR) 80 MG tablet Take 1 tablet (80 mg total) by mouth once daily.    blood sugar diagnostic Strp 1 strip by Misc.(Non-Drug; Combo Route) route once daily.    diclofenac sodium (VOLTAREN) 1 % Gel Apply 2 g topically 4 (four) times daily.    ferrous sulfate (FEOSOL) 325 mg (65 mg iron) Tab tablet Take 1 tablet (325 mg total) by mouth once daily.    flu vacc [id number],65yr up,PF (FLUZONE HIGH-DOSE 2019-20, PF,) 180 mcg/0.5 mL Syrg inject 0.5 ml into the muscle for one dose    glipiZIDE (GLUCOTROL) 10 MG tablet Take 2 tablets (20 mg total) by mouth daily with breakfast.    insulin detemir U-100 (LEVEMIR FLEXTOUCH) 100 unit/mL (3 mL) SubQ InPn pen Inject 15 Units into the skin every evening.    lancets Misc 1 lancet by Misc.(Non-Drug; Combo Route) route once daily.    losartan (COZAAR) 100 MG tablet Take 1 tablet (100 mg total) by mouth once daily.    metoprolol succinate (TOPROL-XL) 100 MG 24 hr tablet Take 1 tablet (100 mg total) by mouth once daily.    multivitamin (ONE DAILY MULTIVITAMIN) per tablet Take 1 tablet by mouth once daily.    pen needle, diabetic 31 gauge x 5/16" Ndle Use every evening.     Family History     Problem Relation (Age of Onset)    Diabetes Father    Heart disease Mother, Father    Heart failure Mother, Father    No Known Problems Brother, Son    Stroke Father, Paternal Grandfather        Tobacco Use    Smoking status: Never Smoker    Smokeless tobacco: Never Used "   Substance and Sexual Activity    Alcohol use: Yes     Alcohol/week: 0.0 standard drinks     Comment: social drinker    Drug use: No    Sexual activity: Yes     Partners: Male     Birth control/protection: Post-menopausal     Review of Systems   Constitution: Negative for chills, decreased appetite and diaphoresis.   HENT: Negative for congestion and ear discharge.    Eyes: Negative for blurred vision and discharge.   Cardiovascular: Negative for chest pain, dyspnea on exertion, irregular heartbeat, leg swelling and paroxysmal nocturnal dyspnea.   Respiratory: Negative for cough, hemoptysis and shortness of breath.    Gastrointestinal: Negative for abdominal pain.     Objective:     Vital Signs (Most Recent):  Temp: 97.9 °F (36.6 °C) (10/28/19 0458)  Pulse: 80 (10/28/19 0458)  Resp: 18 (10/28/19 0458)  BP: (!) 177/86 (10/28/19 0458)  SpO2: 97 % (10/28/19 0458) Vital Signs (24h Range):  Temp:  [97.5 °F (36.4 °C)-98.5 °F (36.9 °C)] 97.9 °F (36.6 °C)  Pulse:  [63-86] 80  Resp:  [15-18] 18  SpO2:  [92 %-99 %] 97 %  BP: (105-177)/(60-92) 177/86     Weight: 101.6 kg (224 lb)  Body mass index is 40.97 kg/m².    SpO2: 97 %  O2 Device (Oxygen Therapy): CPAP      Intake/Output Summary (Last 24 hours) at 10/28/2019 0649  Last data filed at 10/27/2019 2200  Gross per 24 hour   Intake 340 ml   Output --   Net 340 ml       Lines/Drains/Airways     Peripheral Intravenous Line                 Peripheral IV - Single Lumen 10/26/19 0054 18 G Right Antecubital 2 days                Physical Exam   Constitutional: She is oriented to person, place, and time. She appears well-developed and well-nourished. No distress.   Eyes: Pupils are equal, round, and reactive to light. Conjunctivae are normal.   Neck: No tracheal deviation present. No thyromegaly present.   Cardiovascular: Normal rate, regular rhythm, normal heart sounds and intact distal pulses. Exam reveals no gallop and no friction rub.   No murmur heard.  Pulses:       Radial  pulses are 2+ on the right side, and 2+ on the left side.        Femoral pulses are 2+ on the right side, and 2+ on the left side.  Well healed sternotomy   Pulmonary/Chest: Effort normal and breath sounds normal. No respiratory distress. She has no wheezes. She has no rales.   Abdominal: Soft. Bowel sounds are normal. She exhibits no distension. There is no tenderness.   Musculoskeletal: She exhibits no edema or deformity.   Neurological: She is alert and oriented to person, place, and time. No cranial nerve deficit. Coordination normal.   Skin: Skin is warm and dry. She is not diaphoretic.   Psychiatric: She has a normal mood and affect. Her behavior is normal.       Significant Labs:   BMP:   Recent Labs   Lab 10/27/19  0447   *      K 3.6      CO2 24   BUN 14   CREATININE 1.0   CALCIUM 8.8   MG 1.6       Significant Imaging: Echocardiogram: 2D echo with color flow doppler: No results found for this or any previous visit.

## 2019-10-29 VITALS
OXYGEN SATURATION: 99 % | HEIGHT: 62 IN | WEIGHT: 224 LBS | HEART RATE: 85 BPM | BODY MASS INDEX: 41.22 KG/M2 | RESPIRATION RATE: 18 BRPM | SYSTOLIC BLOOD PRESSURE: 122 MMHG | DIASTOLIC BLOOD PRESSURE: 60 MMHG | TEMPERATURE: 98 F

## 2019-10-29 LAB
ANION GAP SERPL CALC-SCNC: 10 MMOL/L (ref 8–16)
BASOPHILS # BLD AUTO: 0.03 K/UL (ref 0–0.2)
BASOPHILS # BLD AUTO: 0.04 K/UL (ref 0–0.2)
BASOPHILS NFR BLD: 0.4 % (ref 0–1.9)
BASOPHILS NFR BLD: 0.5 % (ref 0–1.9)
BUN SERPL-MCNC: 18 MG/DL (ref 8–23)
CALCIUM SERPL-MCNC: 9.1 MG/DL (ref 8.7–10.5)
CHLORIDE SERPL-SCNC: 105 MMOL/L (ref 95–110)
CO2 SERPL-SCNC: 23 MMOL/L (ref 23–29)
CREAT SERPL-MCNC: 1.2 MG/DL (ref 0.5–1.4)
DIFFERENTIAL METHOD: ABNORMAL
DIFFERENTIAL METHOD: ABNORMAL
EOSINOPHIL # BLD AUTO: 0.1 K/UL (ref 0–0.5)
EOSINOPHIL # BLD AUTO: 0.1 K/UL (ref 0–0.5)
EOSINOPHIL NFR BLD: 1.3 % (ref 0–8)
EOSINOPHIL NFR BLD: 1.9 % (ref 0–8)
ERYTHROCYTE [DISTWIDTH] IN BLOOD BY AUTOMATED COUNT: 15.3 % (ref 11.5–14.5)
ERYTHROCYTE [DISTWIDTH] IN BLOOD BY AUTOMATED COUNT: 15.3 % (ref 11.5–14.5)
EST. GFR  (AFRICAN AMERICAN): 52.2 ML/MIN/1.73 M^2
EST. GFR  (NON AFRICAN AMERICAN): 45.3 ML/MIN/1.73 M^2
GLUCOSE SERPL-MCNC: 143 MG/DL (ref 70–110)
HCT VFR BLD AUTO: 31.9 % (ref 37–48.5)
HCT VFR BLD AUTO: 32.3 % (ref 37–48.5)
HGB BLD-MCNC: 10 G/DL (ref 12–16)
HGB BLD-MCNC: 9.8 G/DL (ref 12–16)
IMM GRANULOCYTES # BLD AUTO: 0.01 K/UL (ref 0–0.04)
IMM GRANULOCYTES # BLD AUTO: 0.03 K/UL (ref 0–0.04)
IMM GRANULOCYTES NFR BLD AUTO: 0.1 % (ref 0–0.5)
IMM GRANULOCYTES NFR BLD AUTO: 0.4 % (ref 0–0.5)
LYMPHOCYTES # BLD AUTO: 0.9 K/UL (ref 1–4.8)
LYMPHOCYTES # BLD AUTO: 1.2 K/UL (ref 1–4.8)
LYMPHOCYTES NFR BLD: 12.1 % (ref 18–48)
LYMPHOCYTES NFR BLD: 16.9 % (ref 18–48)
MAGNESIUM SERPL-MCNC: 1.7 MG/DL (ref 1.6–2.6)
MCH RBC QN AUTO: 26.4 PG (ref 27–31)
MCH RBC QN AUTO: 26.6 PG (ref 27–31)
MCHC RBC AUTO-ENTMCNC: 30.7 G/DL (ref 32–36)
MCHC RBC AUTO-ENTMCNC: 31 G/DL (ref 32–36)
MCV RBC AUTO: 86 FL (ref 82–98)
MCV RBC AUTO: 86 FL (ref 82–98)
MONOCYTES # BLD AUTO: 0.5 K/UL (ref 0.3–1)
MONOCYTES # BLD AUTO: 0.6 K/UL (ref 0.3–1)
MONOCYTES NFR BLD: 6.4 % (ref 4–15)
MONOCYTES NFR BLD: 8.1 % (ref 4–15)
NEUTROPHILS # BLD AUTO: 4.9 K/UL (ref 1.8–7.7)
NEUTROPHILS # BLD AUTO: 6 K/UL (ref 1.8–7.7)
NEUTROPHILS NFR BLD: 72.3 % (ref 38–73)
NEUTROPHILS NFR BLD: 79.6 % (ref 38–73)
NRBC BLD-RTO: 0 /100 WBC
NRBC BLD-RTO: 0 /100 WBC
PHOSPHATE SERPL-MCNC: 4.3 MG/DL (ref 2.7–4.5)
PLATELET # BLD AUTO: 229 K/UL (ref 150–350)
PLATELET # BLD AUTO: 256 K/UL (ref 150–350)
PMV BLD AUTO: 10.4 FL (ref 9.2–12.9)
PMV BLD AUTO: 10.7 FL (ref 9.2–12.9)
POC ACTIVATED CLOTTING TIME K: 246 SEC (ref 74–137)
POCT GLUCOSE: 145 MG/DL (ref 70–110)
POCT GLUCOSE: 152 MG/DL (ref 70–110)
POCT GLUCOSE: 232 MG/DL (ref 70–110)
POTASSIUM SERPL-SCNC: 3.8 MMOL/L (ref 3.5–5.1)
RBC # BLD AUTO: 3.71 M/UL (ref 4–5.4)
RBC # BLD AUTO: 3.76 M/UL (ref 4–5.4)
SAMPLE: ABNORMAL
SODIUM SERPL-SCNC: 138 MMOL/L (ref 136–145)
WBC # BLD AUTO: 6.82 K/UL (ref 3.9–12.7)
WBC # BLD AUTO: 7.5 K/UL (ref 3.9–12.7)

## 2019-10-29 PROCEDURE — 84100 ASSAY OF PHOSPHORUS: CPT

## 2019-10-29 PROCEDURE — 85025 COMPLETE CBC W/AUTO DIFF WBC: CPT

## 2019-10-29 PROCEDURE — 99238 PR HOSPITAL DISCHARGE DAY,<30 MIN: ICD-10-PCS | Mod: ,,, | Performed by: HOSPITALIST

## 2019-10-29 PROCEDURE — 25000003 PHARM REV CODE 250: Performed by: HOSPITALIST

## 2019-10-29 PROCEDURE — G0008 ADMIN INFLUENZA VIRUS VAC: HCPCS | Performed by: HOSPITALIST

## 2019-10-29 PROCEDURE — 63600175 PHARM REV CODE 636 W HCPCS: Performed by: HOSPITALIST

## 2019-10-29 PROCEDURE — 99238 HOSP IP/OBS DSCHRG MGMT 30/<: CPT | Mod: ,,, | Performed by: HOSPITALIST

## 2019-10-29 PROCEDURE — 83735 ASSAY OF MAGNESIUM: CPT

## 2019-10-29 PROCEDURE — 36415 COLL VENOUS BLD VENIPUNCTURE: CPT

## 2019-10-29 PROCEDURE — 94660 CPAP INITIATION&MGMT: CPT

## 2019-10-29 PROCEDURE — 80048 BASIC METABOLIC PNL TOTAL CA: CPT

## 2019-10-29 PROCEDURE — 90662 IIV NO PRSV INCREASED AG IM: CPT | Performed by: HOSPITALIST

## 2019-10-29 PROCEDURE — 99900035 HC TECH TIME PER 15 MIN (STAT)

## 2019-10-29 PROCEDURE — 25000003 PHARM REV CODE 250: Performed by: PHYSICIAN ASSISTANT

## 2019-10-29 PROCEDURE — 90471 IMMUNIZATION ADMIN: CPT | Performed by: HOSPITALIST

## 2019-10-29 RX ORDER — METHOCARBAMOL 500 MG/1
500 TABLET, FILM COATED ORAL 3 TIMES DAILY PRN
Qty: 21 TABLET | Refills: 0 | Status: SHIPPED | OUTPATIENT
Start: 2019-10-29 | End: 2019-11-08

## 2019-10-29 RX ORDER — NAPROXEN SODIUM 220 MG/1
81 TABLET, FILM COATED ORAL DAILY
Refills: 0 | COMMUNITY
Start: 2019-10-30 | End: 2023-03-09

## 2019-10-29 RX ORDER — CLOPIDOGREL BISULFATE 75 MG/1
75 TABLET ORAL DAILY
Qty: 30 TABLET | Refills: 10 | Status: SHIPPED | OUTPATIENT
Start: 2019-11-27 | End: 2020-08-10 | Stop reason: SDUPTHER

## 2019-10-29 RX ORDER — CLOPIDOGREL BISULFATE 75 MG/1
TABLET ORAL
Qty: 37 TABLET | Refills: 0 | Status: SHIPPED | OUTPATIENT
Start: 2019-10-30 | End: 2019-11-20

## 2019-10-29 RX ADMIN — INFLUENZA A VIRUS A/MICHIGAN/45/2015 X-275 (H1N1) ANTIGEN (FORMALDEHYDE INACTIVATED), INFLUENZA A VIRUS A/SINGAPORE/INFIMH-16-0019/2016 IVR-186 (H3N2) ANTIGEN (FORMALDEHYDE INACTIVATED), AND INFLUENZA B VIRUS B/MARYLAND/15/2016 BX-69A (A B/COLORADO/6/2017-LIKE VIRUS) ANTIGEN (FORMALDEHYDE INACTIVATED) 0.5 ML: 60; 60; 60 INJECTION, SUSPENSION INTRAMUSCULAR at 05:10

## 2019-10-29 RX ADMIN — ASPIRIN 81 MG CHEWABLE TABLET 81 MG: 81 TABLET CHEWABLE at 08:10

## 2019-10-29 RX ADMIN — METHOCARBAMOL TABLETS 500 MG: 500 TABLET, COATED ORAL at 09:10

## 2019-10-29 RX ADMIN — HYDROCODONE BITARTRATE AND ACETAMINOPHEN 1 TABLET: 5; 325 TABLET ORAL at 03:10

## 2019-10-29 RX ADMIN — TICAGRELOR 90 MG: 90 TABLET ORAL at 08:10

## 2019-10-29 RX ADMIN — AMLODIPINE BESYLATE 10 MG: 10 TABLET ORAL at 08:10

## 2019-10-29 RX ADMIN — METOPROLOL SUCCINATE 100 MG: 100 TABLET, EXTENDED RELEASE ORAL at 08:10

## 2019-10-29 RX ADMIN — ATORVASTATIN CALCIUM 80 MG: 20 TABLET, FILM COATED ORAL at 08:10

## 2019-10-29 RX ADMIN — FERROUS SULFATE TAB EC 325 MG (65 MG FE EQUIVALENT) 325 MG: 325 (65 FE) TABLET DELAYED RESPONSE at 08:10

## 2019-10-29 RX ADMIN — LOSARTAN POTASSIUM 100 MG: 50 TABLET, FILM COATED ORAL at 08:10

## 2019-10-29 NOTE — PLAN OF CARE
Plan of care discussed with patient. Patient is free of fall/trauma/injury. Denies CP, SOB, or pain/discomfort. Patient groin site clean dry intact and soft. All questions addressed. Will continue to monitor

## 2019-10-29 NOTE — PLAN OF CARE
10/29/19 1011   Post-Acute Status   Post-Acute Authorization Placement;Other   Other Status No Post-Acute Service Needs     Patient expected to discharge home today with no needs. SW will continue to follow up.    Lulu Hoffman LMSW  [facility]   [id number]  .

## 2019-10-29 NOTE — PLAN OF CARE
D/C orders noted. Home with family. Interventional Cariology appt. Requested. Clinic to notify pt. with appt. date.      Future Appointments   Date Time Provider Department Center   11/20/2019  8:00 AM Washington Cortez MD [facility] CARDIO [facility]   11/21/2019 11:00 AM Liz Roberts MD [facility]   1/21/2020  2:30 PM Dorinda Pritchard NP [facility] ENDODIA [facility]   2/27/2020  2:00 PM Donna Gillis DPM [facility]          10/29/19 1238   Final Note   Assessment Type Final Discharge Note   Anticipated Discharge Disposition Home   Hospital Follow Up  Appt(s) scheduled? Yes

## 2019-10-29 NOTE — CONSULTS
Cardiac Rehab     Kaylee Romero   [id number]   10/29/2019       Cardiac Rehab Phase Taught: Phase I & II    Risk Factors-Modifiable: diabetes, nutrition, hyperlipidemia, hypertension, obesity, sedentary lifestyle, stress, exercise    Risk Factors-Non modifiable: age, race    Teaching Method: Verbal, Written and Living with Heart Disease book.    Understanding/Response: Discussed cardiac rehab with patient and explained that her approval for cardiac rehab is still pending. Pt verbalized understanding, all questions answered.       NELA Scott RN  Cardiac Rehab Nurse

## 2019-10-29 NOTE — NURSING
Pt discharged per MD orders.  Tele discontinued and returned to station.  IV discontinued; catheter tip intact x.  Medication list and prescriptions reviewed; prescriptions given to patient via bedside delivery.  Pt verbalizes understanding of all written and verbal discharge instructions.  Patient stated that she did not want for me to give her insulin for her elevated blood glucose, as she is not going to eat until after she leaves the hospital. Pt awaiting family/escort arrival.  Will continue to monitor.

## 2019-10-29 NOTE — PLAN OF CARE
Pt is A, A, Ox4. Calm, cooperative. Free of falls, trauma, and injuries. Skin intact ex R groin site. Pt educated on treatment plan. Pt demonstrates and verbalizes understanding. VSS. LHC done today. BP monitored closely, PRN for elevated BP. BG monitored ACHS. Plan of care reviewed with pt.

## 2019-10-29 NOTE — PLAN OF CARE
[facility] performed 10/28. Groin sites checked frequently; CDI. Fluid bolus completed. VSS. Pt denies SOB or chest pain. Pt remain on telemetry; SR on telemetry. Fall precautions maintained. Pt free of falls and injuries. POC discussed with patient/family, verbalized understanding. Questions answered, no distress at present.

## 2019-10-30 NOTE — HOSPITAL COURSE
*NSTEMI   Coronary artery disease of bypass graft of native heart with unstable angina pectoris  - non-exertional CP associated with high BP readings at home  - EKG w/o ischemic changes  - Tn trended up  - ACS protocol initiated with brilinta and IV UFH  - Interventional cardiology consulted, performed angiogram 10/28, stents placed in PRCA & LAD. if symptoms in future will consider OM intervention -   90% PRCA, 80% mLAD, 70% OM1 stenosis  Competitive flow in OMB and mLAD  Patent SVG to OM graft with distal disease  Patent but small VASQUES to LAD  Pt unable to afford Brilinta - so she was discharged on Plavix which she will need to take for 1 year at least, uninterrupted. Changed ASA to 81 daily. Cont high intensity statin.  Has Cardiology f/u scheduled     Type 2 diabetes mellitus with stage 3 chronic kidney disease and hypertension  - resume home detemir and glipizide  - consider discontinuing one of the above meds as she is on both glipizide and insulin simultaneously - defer to PCP            Lab Results   Component Value Date     HGBA1C 7.5 (H) 08/07/2019         CKD (chronic kidney disease) stage 3, GFR 30-59 ml/min  - stable     KAMRAN on CPAP  - CPAP nightly     Morbid obesity with body mass index (BMI) of 40.0 or higher  - obesity complicates all aspects of disease management from diagnostic modalities to treatment. Weight loss encouraged and health benefits explained to patient.     Hyperlipidemia  - continue statin and ASA     Essential hypertension  - uncontrolled BP on admission and during CP episodes  - continue norvasc 10 mg daily  - continue losartan 100 mg daily  - continue MTP succinate 100 mg daily  - BP's WNL at time of discharge

## 2019-10-30 NOTE — DISCHARGE SUMMARY
"[facility] Hospital Medicine  Discharge Summary      Patient Name: Kaylee Romero  MRN: [id number]  Admission Date: 10/26/2019  Hospital Length of Stay: 1 days  Discharge Date and Time: 10/29/2019  5:58 PM  Attending Physician: Vidhi att. providers found   Discharging Provider: Amy Oconnell MD  Primary Care Provider: Liz Roberts MD  [facility] Medicine Team: [facility] C Amy Oconnell MD    HPI:   Mrs. Romero is a 74F with CAD s/p NSTEMI 07/2019 and CABG x 2 08/12/2019 (KENDY to LAD and SVG to OM), IDDM, HTN, HLD, obesity, KAMRAN on CPAP, CKD who presents for evaluation of non-exertional CP relieved with NTG. Patient states that a few days ago she had a vague "strange sensation" in her chest "over her heart". She didn't think anything of it. On the evening of 10/25 at 2230, while watching TV she had pain associated with her strange feeling. This was the first time she has had chest pain like this since her surgery. She states the pain is similar to her prior MI with radiation down her left arm, which she says is different from her chronic left shoulder pain. She took NTG at home x 3 w/o relief. She recorded her BP during the pain episode and highest recording at home was 193/105. She denies pain with exertion. She denies incisional pain. She denies respiratory or GI symptoms. She has been complaint with her BP medications. She is frustrated by her repeat hospitalizations. She reports a return of her CP symptoms on admission, subsided w/o intervention.    Of note, she was hospitalized again soon after her CABG with urosepsis and discharged to SNF.     ED: BP max 217/85, Tn 0.026, EKG w/o ST/T wave changes,     Procedure(s):  Stent, Drug Eluting, Multi Vessel, Coronary      Hospital Course:   *NSTEMI   Coronary artery disease of bypass graft of native heart with unstable angina pectoris  - non-exertional CP associated with high BP readings at home  - EKG w/o ischemic changes  - Tn trended up  - " ACS protocol initiated with brilinta and IV UFH  - Interventional cardiology consulted, performed angiogram 10/28, stents placed in PRCA & LAD. if symptoms in future will consider OM intervention -   90% PRCA, 80% mLAD, 70% OM1 stenosis  Competitive flow in OMB and mLAD  Patent SVG to OM graft with distal disease  Patent but small VASQUES to LAD  Pt unable to afford Brilinta - so she was discharged on Plavix which she will need to take for 1 year at least, uninterrupted. Changed ASA to 81 daily. Cont high intensity statin.  Has Cardiology f/u scheduled     Type 2 diabetes mellitus with stage 3 chronic kidney disease and hypertension  - resume home detemir and glipizide  - consider discontinuing one of the above meds as she is on both glipizide and insulin simultaneously - defer to PCP            Lab Results   Component Value Date     HGBA1C 7.5 (H) 08/07/2019         CKD (chronic kidney disease) stage 3, GFR 30-59 ml/min  - stable     KAMRAN on CPAP  - CPAP nightly     Morbid obesity with body mass index (BMI) of 40.0 or higher  - obesity complicates all aspects of disease management from diagnostic modalities to treatment. Weight loss encouraged and health benefits explained to patient.     Hyperlipidemia  - continue statin and ASA     Essential hypertension  - uncontrolled BP on admission and during CP episodes  - continue norvasc 10 mg daily  - continue losartan 100 mg daily  - continue MTP succinate 100 mg daily  - BP's WNL at time of discharge     Consults:   Consults (From admission, onward)        Status Ordering Provider     Inpatient consult to Cardiology  Once     Provider:  (Not yet assigned)    Completed BRIAN STARR     Inpatient consult to Interventional Cardiology  Once     Provider:  (Not yet assigned)    Completed MEGHAN TABARES new Assessment & Plan notes have been filed under this hospital service since the last note was generated.  Service: Hospital Medicine    Final Active Diagnoses:     Diagnosis Date Noted POA    PRINCIPAL PROBLEM:  NSTEMI (non-ST elevated myocardial infarction) [I21.4] 07/08/2019 Yes    Chest pain [R07.9] 10/26/2019 Yes    Coronary artery disease involving coronary bypass graft of native heart with unstable angina pectoris [I25.700] 08/04/2019 Yes    Type 2 diabetes mellitus with stage 3 chronic kidney disease and hypertension [E11.22, I12.9, N18.3] 05/16/2019 Yes    CKD (chronic kidney disease) stage 3, GFR 30-59 ml/min [N18.3] 04/20/2017 Yes    KAMRAN on CPAP [G47.33, Z99.89] 06/28/2016 Not Applicable    Morbid obesity with body mass index (BMI) of 40.0 or higher [E66.01] 05/09/2016 Yes    Hyperlipidemia [E78.5] 12/02/2014 Yes    Essential hypertension [I10] 11/28/2012 Yes      Problems Resolved During this Admission:       Discharged Condition: good    Disposition: Home or Self Care    Follow Up:  Follow-up Information     Schedule an appointment as soon as possible for a visit with Liz Roberts MD.    Specialty:  Internal Medicine  Contact information:  [address]  [place] [id number] [phone]             Schedule an appointment as soon as possible for a visit with Pop Henderson MD.    Specialty:  Cardiology  Contact information:  [address]  [place] [id number] [phone]                 Patient Instructions:      Ambulatory Referral to Interventional Cardiology   Referral Priority: Routine Referral Type: Consultation   Referral Reason: Specialty Services Required   Requested Specialty: INTERVENTIONAL CARDIOLOGY   Number of Visits Requested: 1     Diet Cardiac     Diet diabetic     Notify your health care provider if you experience any of the following:  severe uncontrolled pain     Notify your health care provider if you experience any of the following:  difficulty breathing or increased cough     Notify your health care provider if you experience any of the following:  persistent dizziness, light-headedness, or visual  "disturbances     Activity as tolerated       Significant Diagnostic Studies: Labs:   CMP   Recent Labs   Lab 10/29/19  0334      K 3.8      CO2 23   *   BUN 18   CREATININE 1.2   CALCIUM 9.1   ANIONGAP 10   ESTGFRAFRICA 52.2*   EGFRNONAA 45.3*    and CBC   Recent Labs   Lab 10/28/19  2345 10/29/19  0335   WBC 7.50 6.82   HGB 10.0* 9.8*   HCT 32.3* 31.9*    256       Pending Diagnostic Studies:     None         Medications:  Reconciled Home Medications:      Medication List      START taking these medications    aspirin 81 MG Chew  Take 1 tablet (81 mg total) by mouth once daily.  Replaces:  aspirin 325 MG EC tablet     * clopidogrel 75 mg tablet  Commonly known as:  PLAVIX  Take 8 tablets (600mg total) by mouth the morning of 10/30/2019. Then take 1 tablet (75mg) by mouth once daily thereafter.     * clopidogrel 75 mg tablet  Commonly known as:  PLAVIX  Take 1 tablet (75 mg total) by mouth once daily.  Start taking on:  November 27, 2019     methocarbamol 500 MG Tab  Commonly known as:  ROBAXIN  Take 1 tablet (500 mg total) by mouth 3 (three) times daily as needed (muscle spasms).         * This list has 2 medication(s) that are the same as other medications prescribed for you. Read the directions carefully, and ask your doctor or other care provider to review them with you.            CONTINUE taking these medications    alcohol swabs Padm  Commonly known as:  ALCOHOL PADS  Apply 1 each topically as needed.     alendronate 70 MG tablet  Commonly known as:  FOSAMAX  Take 1 tablet (70 mg total) by mouth every 7 days. Take with a full glass of water & remain upright for at least 30 minutes     amLODIPine 10 MG tablet  Commonly known as:  NORVASC  Take 1 tablet (10 mg total) by mouth once daily.     atorvastatin 80 MG tablet  Commonly known as:  LIPITOR  Take 1 tablet (80 mg total) by mouth once daily.     BD ULTRA-FINE SHORT PEN NEEDLE 31 gauge x 5/16" Ndle  Generic drug:  pen needle, " diabetic  Use every evening.     blood sugar diagnostic Strp  1 strip by Misc.(Non-Drug; Combo Route) route once daily.     diclofenac sodium 1 % Gel  Commonly known as:  VOLTAREN  Apply 2 g topically 4 (four) times daily.     ferrous sulfate 325 mg (65 mg iron) Tab tablet  Commonly known as:  FEOSOL  Take 1 tablet (325 mg total) by mouth once daily.     flu vacc [id number](65yr up) mcg/0.5 mL Syrg  Commonly known as:  FLUZONE HIGH-DOSE 2019-20 (PF)  inject 0.5 ml into the muscle for one dose     glipiZIDE 10 MG tablet  Commonly known as:  GLUCOTROL  Take 2 tablets (20 mg total) by mouth daily with breakfast.     insulin detemir U-100 100 unit/mL (3 mL) Inpn pen  Commonly known as:  LEVEMIR FLEXTOUCH  Inject 15 Units into the skin every evening.     lancets Misc  1 lancet by Misc.(Non-Drug; Combo Route) route once daily.     losartan 100 MG tablet  Commonly known as:  COZAAR  Take 1 tablet (100 mg total) by mouth once daily.     metoprolol succinate 100 MG 24 hr tablet  Commonly known as:  TOPROL-XL  Take 1 tablet (100 mg total) by mouth once daily.     ONE DAILY MULTIVITAMIN per tablet  Generic drug:  multivitamin  Take 1 tablet by mouth once daily.        STOP taking these medications    aspirin 325 MG EC tablet  Commonly known as:  ECOTRIN  Replaced by:  aspirin 81 MG Chew            Indwelling Lines/Drains at time of discharge:   Lines/Drains/Airways     None                 Time spent on the discharge of patient: 29 minutes  Patient was seen and examined on the date of discharge and determined to be suitable for discharge.         Amy Oconnell MD  Department of Hospital Medicine  [facility]

## 2019-10-31 ENCOUNTER — TELEPHONE (OUTPATIENT)
Dept: CARDIAC REHAB | Facility: CLINIC | Age: 74
End: 2019-10-31

## 2019-11-01 ENCOUNTER — TELEPHONE (OUTPATIENT)
Dept: CARDIOTHORACIC SURGERY | Facility: CLINIC | Age: 74
End: 2019-11-01

## 2019-11-04 ENCOUNTER — TELEPHONE (OUTPATIENT)
Dept: ENDOSCOPY | Facility: HOSPITAL | Age: 74
End: 2019-11-04

## 2019-11-04 NOTE — TELEPHONE ENCOUNTER
----- Message from Alisha Conti sent at 11/4/2019  2:21 PM CST -----  Contact: self [phone]  Pt is calling to request an appt for np, pt seen doctor in the hospital during her heart attack. Pt is interested in program she is starting for warning for women.    Please give pt a call back      Thank you!

## 2019-11-05 ENCOUNTER — PATIENT OUTREACH (OUTPATIENT)
Dept: OTHER | Facility: OTHER | Age: 74
End: 2019-11-05

## 2019-11-05 DIAGNOSIS — I10 ESSENTIAL HYPERTENSION: Primary | ICD-10-CM

## 2019-11-06 NOTE — PROGRESS NOTES
Digital Medicine: Clinician Follow-Up    Kaylee Romero submitted a reading of 183/90 at 11/5/2019  9:14 AM. Called patient for hypertension follow-up. She reports that high BP alert reading was due to taking BP without any medications on board. She denies any s/sx of hypertension - no CP, SOB, headache, blurred vision. She expresses concern about her blood pressure not being better controlled. No clear reason why her previous regimen (chlorthalidone, nifedipine, coreg, diovan, nitroglycerin) where BP was well-controlled was changed to her current regimen (amlodipine, metoprolol, losartan, nitroglycerin) after her August discharge for coronary artery bypass graft surgery. Mrs. Page states she never took nifedipine to her knowledge.     The history is provided by the patient. No  was used.     Follow Up  Follow-up reason(s): reading review      Readings are trending up due to She has no clear understanding as to why BP is elevated. Note: Patient's regimen was adjusted at discharge after CABG surgery in August to different BP agents.        INTERVENTION(S)  recommended med change and encouragement/support    PLAN  patient verbalizes understanding, patient amenable to changes and continue monitoring    Hypertension:  Current 30-day BP average of 151/95 is uncontrolled, does not meet goal of <130/80.  Reviewed readings - trending upwards where both SBP and DBP are consistently uncontrolled.   Reviewed medication history - appears that previous regimen where Mrs. Osorio blood pressure was well-controlled was not restarted after discharge for CABG surgery back in August:  - coreg 6.25 bid  - nifedipine 30 qd  - chlorthalidone 25   - diovan 320   Will restart these medications minus nifedipine (patient says she never took this drug at home) and chlorthalidone due to declining kidney function (GFR 52.2, Cr 1.2) at discharge on 10/29/19 for NSTEMI.  Repeat labs on 11/14/19 to determine baseline of  kidney function and electrolytes to determine if chlorthalidone can be restarted.    Stop metoprolol and losartan.  Restart Coreg and Diovan for better BP control.   Consider switching amlodipine to nifedipine to improve DBP.   Repeat BMP on 11/14/19 for electrolyte and kidney function.  Determine if chlorthalidone should be restarted based on BP response to changes above and BMP results.     Follow-up in 1-2 weeks.       Diabetes:  Last A1c of 6.6% meets goal of <7%.  Reviewed readings - meets mealtime goals.  No changes to diabetes regimen at this time.      There are no preventive care reminders to display for this patient.    Last 5 Patient Entered Readings                                      Current 30 Day Average: 151/95     Recent Readings 11/6/2019 11/6/2019 11/5/2019 11/4/2019 11/1/2019    SBP (mmHg) 165 161 183 144 157    DBP (mmHg) 98 96 90 80 99    Pulse 87 93 111 92 88        Last 6 Patient Entered Readings                                          Most Recent A1c: 6.6% on 10/26/2019  (Goal: 8%)     Recent Readings 11/6/2019 11/5/2019 11/4/2019 11/2/2019 11/1/2019    Blood Glucose (mg/dL) 74 133 145 139 157         Hypertension Medications             amLODIPine (NORVASC) 10 MG tablet Take 1 tablet (10 mg total) by mouth once daily.    carvedilol (COREG) 6.25 MG tablet Take 1 tablet (6.25 mg total) by mouth 2 (two) times daily with meals.    valsartan (DIOVAN) 320 MG tablet Take 1 tablet (320 mg total) by mouth once daily.          Diabetes Medications             glipiZIDE (GLUCOTROL) 10 MG tablet Take 2 tablets (20 mg total) by mouth daily with breakfast.    insulin detemir U-100 (LEVEMIR FLEXTOUCH) 100 unit/mL (3 mL) SubQ InPn pen Inject 15 Units into the skin every evening.               Screenings

## 2019-11-07 RX ORDER — CHLORTHALIDONE 25 MG/1
25 TABLET ORAL DAILY
Qty: 30 TABLET | Refills: 5 | Status: SHIPPED | OUTPATIENT
Start: 2019-11-07 | End: 2019-11-07 | Stop reason: CLARIF

## 2019-11-07 RX ORDER — CARVEDILOL 6.25 MG/1
6.25 TABLET ORAL 2 TIMES DAILY WITH MEALS
Qty: 60 TABLET | Refills: 5 | Status: SHIPPED | OUTPATIENT
Start: 2019-11-07 | End: 2019-11-20

## 2019-11-07 RX ORDER — VALSARTAN 320 MG/1
320 TABLET ORAL DAILY
Qty: 30 TABLET | Refills: 5 | Status: SHIPPED | OUTPATIENT
Start: 2019-11-07 | End: 2020-05-08 | Stop reason: SDUPTHER

## 2019-11-11 ENCOUNTER — PATIENT OUTREACH (OUTPATIENT)
Dept: OTHER | Facility: OTHER | Age: 74
End: 2019-11-11

## 2019-11-11 NOTE — PROGRESS NOTES
Mrs. Romero left a message on Friday, 11/8 questioning if she should take metoprolol for her blood pressure. Returned phone call to patient. She was resting, unavailable. Spoke with her , Mr. Romero to clarify what she should be taking for her blood pressure: amlodipine, carvedilol and valsartan. We will determine if diuretic can be restarted after her lab work. Mr. Romero wrote drug names down and expressed understanding. He was instructed to have patient call me if she had any questions or concerns.     Hypertension Medications             amLODIPine (NORVASC) 10 MG tablet Take 1 tablet (10 mg total) by mouth once daily.    carvedilol (COREG) 6.25 MG tablet Take 1 tablet (6.25 mg total) by mouth 2 (two) times daily with meals.    valsartan (DIOVAN) 320 MG tablet Take 1 tablet (320 mg total) by mouth once daily.        Diabetes Medications             glipiZIDE (GLUCOTROL) 10 MG tablet Take 2 tablets (20 mg total) by mouth daily with breakfast.    insulin detemir U-100 (LEVEMIR FLEXTOUCH) 100 unit/mL (3 mL) SubQ InPn pen Inject 15 Units into the skin every evening.

## 2019-11-12 ENCOUNTER — TELEPHONE (OUTPATIENT)
Dept: ORTHOPEDICS | Facility: CLINIC | Age: 74
End: 2019-11-12

## 2019-11-12 ENCOUNTER — OFFICE VISIT (OUTPATIENT)
Dept: URGENT CARE | Facility: CLINIC | Age: 74
End: 2019-11-12
Payer: MEDICARE

## 2019-11-12 VITALS
DIASTOLIC BLOOD PRESSURE: 92 MMHG | BODY MASS INDEX: 41.22 KG/M2 | HEIGHT: 62 IN | OXYGEN SATURATION: 99 % | SYSTOLIC BLOOD PRESSURE: 172 MMHG | HEART RATE: 83 BPM | WEIGHT: 224 LBS | TEMPERATURE: 98 F

## 2019-11-12 DIAGNOSIS — M54.42 ACUTE LEFT-SIDED LOW BACK PAIN WITH LEFT-SIDED SCIATICA: Primary | ICD-10-CM

## 2019-11-12 PROCEDURE — 99214 PR OFFICE/OUTPT VISIT, EST, LEVL IV, 30-39 MIN: ICD-10-PCS | Mod: S$GLB,,, | Performed by: NURSE PRACTITIONER

## 2019-11-12 PROCEDURE — 99214 OFFICE O/P EST MOD 30 MIN: CPT | Mod: S$GLB,,, | Performed by: NURSE PRACTITIONER

## 2019-11-12 PROCEDURE — 1101F PT FALLS ASSESS-DOCD LE1/YR: CPT | Mod: CPTII,S$GLB,, | Performed by: NURSE PRACTITIONER

## 2019-11-12 PROCEDURE — 1101F PR PT FALLS ASSESS DOC 0-1 FALLS W/OUT INJ PAST YR: ICD-10-PCS | Mod: CPTII,S$GLB,, | Performed by: NURSE PRACTITIONER

## 2019-11-12 PROCEDURE — 3080F DIAST BP >= 90 MM HG: CPT | Mod: CPTII,S$GLB,, | Performed by: NURSE PRACTITIONER

## 2019-11-12 PROCEDURE — 3077F PR MOST RECENT SYSTOLIC BLOOD PRESSURE >= 140 MM HG: ICD-10-PCS | Mod: CPTII,S$GLB,, | Performed by: NURSE PRACTITIONER

## 2019-11-12 PROCEDURE — 3077F SYST BP >= 140 MM HG: CPT | Mod: CPTII,S$GLB,, | Performed by: NURSE PRACTITIONER

## 2019-11-12 PROCEDURE — 3080F PR MOST RECENT DIASTOLIC BLOOD PRESSURE >= 90 MM HG: ICD-10-PCS | Mod: CPTII,S$GLB,, | Performed by: NURSE PRACTITIONER

## 2019-11-12 RX ORDER — ORPHENADRINE CITRATE 100 MG/1
100 TABLET, EXTENDED RELEASE ORAL 2 TIMES DAILY
Qty: 14 TABLET | Refills: 0 | Status: SHIPPED | OUTPATIENT
Start: 2019-11-12 | End: 2019-11-19

## 2019-11-12 NOTE — PATIENT INSTRUCTIONS
Patient to continue extra-strength Tylenol every 4-6 hours.  Take nor flex twice a day.  Use over-the-counter lidocaine patches     patient is scheduled with spine on December 3rd.    Patient follow-up with PCP or the emergency room if symptoms persist or worsen.      Relieving Back Pain  Back pain is a common problem. You can strain back muscles by lifting too much weight or just by moving the wrong way. Back strain can be uncomfortable, even painful. And it can take weeks or months to improve. To help yourself feel better and prevent future back strains, try these tips.  Important Note: Do not give aspirin to children or teens without first discussing it with your healthcare provider.      ? Ice    Ice reduces muscle pain and swelling. It helps most during the first 24 to 48 hours after an injury.  · Wrap an ice pack or a bag of frozen peas in a thin towel. (Never place ice directly on your skin.)  · Place the ice where your back hurts the most.  · Dont ice for more than 20 minutes at a time.  · You can use ice several times a day.  ? Medicines  Over-the-counter pain relievers can include acetaminophen and anti-inflammatory medicines, which includes aspirin or ibuprofen. They can help ease discomfort. Some also reduce swelling.  · Tell your healthcare provider about any medicines you are already taking.  · Take medicines only as directed.  ? Heat  After the first 48 hours, heat can relax sore muscles and improve blood flow.  · Try a warm bath or shower. Or use a heating pad set on low. To prevent a burn, keep a cloth between you and the heating pad.  · Dont use a heating pad for more than 15 minutes at a time. Never sleep on a heating pad.  Date Last Reviewed: 9/1/2015  © 1099-6027 [employer]. [address] [place] [address], [place] [address]. All rights reserved. This information is not intended as a substitute for professional medical care. Always follow your healthcare professional's  instructions.

## 2019-11-12 NOTE — TELEPHONE ENCOUNTER
----- Message from Corazon Peterson sent at 11/12/2019 11:48 AM CST -----  Regarding: External Patient Referral  Good Morning,    I have this patient scheduled 12/3 but Urgent Care is wanting the patient to be seen sooner. The patient is having aute left-sided low back pain with left-sided sciatica. I have added the patient to the wait list as well.    Please call the patient if we are able to move the appointment up any sooner.    Thank you,  Corazon  Return call to patient in regards to rescheduling her appointment with Mrs. Gibbs. Tried to rescheduled appointment. December 03/2019 was the soonest that she can be scheduled. Explained it to Mrs. Romero. Patient stated that she will just keep that appointment .  Stated to patient thank you.

## 2019-11-12 NOTE — PROGRESS NOTES
"Subjective:       Patient ID: Kaylee Romero is a 74 y.o. female.    Vitals:  height is 5' 2" (1.575 m) and weight is 101.6 kg (224 lb). Her temperature is 98.2 °F (36.8 °C). Her blood pressure is 172/92 (abnormal) and her pulse is 83. Her oxygen saturation is 99%.     Chief Complaint: Back Pain (lumbar spine) and Spasms (lumbar spine)    Back Pain   This is a chronic (low back pain started while in hospital at [facility] 2 weeks ago.weeks ago) problem. The pain is present in the lumbar spine (left side). The quality of the pain is described as aching. Radiates to: radiates left leg. The pain is at a severity of 8/10. The pain is moderate. The pain is the same all the time. The symptoms are aggravated by position. Stiffness is present all day. Associated symptoms include leg pain. Pertinent negatives include no abdominal pain, bladder incontinence, bowel incontinence, dysuria or numbness. She has tried nothing for the symptoms. The treatment provided no relief.   Spasms   This is a chronic problem. Episode onset: 2 weeks. The problem occurs intermittently. The problem has been gradually worsening. Pertinent negatives include no abdominal pain, fatigue, numbness or rash. Exacerbated by: movement. She has tried nothing for the symptoms. The treatment provided no relief.       Constitution: Negative for fatigue.   Gastrointestinal: Negative for abdominal pain and bowel incontinence.   Genitourinary: Negative for dysuria, urgency, bladder incontinence and hematuria.   Musculoskeletal: Positive for pain and back pain. Negative for muscle cramps and history of spine disorder.        Lumbar spine/ left leg.   Skin: Negative for rash.   Neurological: Negative for coordination disturbances, numbness and tingling.       Objective:      Physical Exam   Constitutional: She is oriented to person, place, and time. She appears well-developed and well-nourished. She is cooperative.  Non-toxic appearance. She does not appear ill. " No distress.   HENT:   Head: Normocephalic and atraumatic.   Right Ear: Hearing, tympanic membrane, external ear and ear canal normal.   Left Ear: Hearing, tympanic membrane, external ear and ear canal normal.   Nose: Nose normal. No mucosal edema, rhinorrhea or nasal deformity. No epistaxis. Right sinus exhibits no maxillary sinus tenderness and no frontal sinus tenderness. Left sinus exhibits no maxillary sinus tenderness and no frontal sinus tenderness.   Mouth/Throat: Uvula is midline, oropharynx is clear and moist and mucous membranes are normal. No trismus in the jaw. Normal dentition. No uvula swelling. No posterior oropharyngeal erythema.   Eyes: Conjunctivae and lids are normal. Right eye exhibits no discharge. Left eye exhibits no discharge. No scleral icterus.   Neck: Trachea normal, normal range of motion, full passive range of motion without pain and phonation normal. Neck supple.   Cardiovascular: Normal rate, regular rhythm, normal heart sounds, intact distal pulses and normal pulses.   Pulmonary/Chest: Effort normal and breath sounds normal. No respiratory distress.   Abdominal: Soft. Normal appearance and bowel sounds are normal. She exhibits no distension, no pulsatile midline mass and no mass. There is no tenderness.   Musculoskeletal: She exhibits no edema or deformity.        Lumbar back: She exhibits decreased range of motion, tenderness, pain and spasm.        Back:    Neurological: She is alert and oriented to person, place, and time. She exhibits normal muscle tone. Coordination normal.   Skin: Skin is warm, dry, intact, not diaphoretic and not pale.   Psychiatric: She has a normal mood and affect. Her speech is normal and behavior is normal. Judgment and thought content normal. Cognition and memory are normal.   Nursing note and vitals reviewed.        Assessment:       1. Acute left-sided low back pain with left-sided sciatica        Plan:         Reviewed patient's chart.  Patient was  just released from the hospital.  She is currently on blood thinners,  Type 2 diabetic, has history of TIAs.  Made appointment for patient with spine on December 3rd.  Message to see if they could see sooner.    Acute left-sided low back pain with left-sided sciatica  -     Ambulatory referral/consult to Orthopedics  -     orphenadrine (NORFLEX) 100 mg tablet; Take 1 tablet (100 mg total) by mouth 2 (two) times daily. for 7 days  Dispense: 14 tablet; Refill: 0      Patient Instructions      Patient to continue extra-strength Tylenol every 4-6 hours.  Take nor flex twice a day.  Use over-the-counter lidocaine patches     patient is scheduled with spine on December 3rd.    Patient follow-up with PCP or the emergency room if symptoms persist or worsen.      Relieving Back Pain  Back pain is a common problem. You can strain back muscles by lifting too much weight or just by moving the wrong way. Back strain can be uncomfortable, even painful. And it can take weeks or months to improve. To help yourself feel better and prevent future back strains, try these tips.  Important Note: Do not give aspirin to children or teens without first discussing it with your healthcare provider.      ? Ice    Ice reduces muscle pain and swelling. It helps most during the first 24 to 48 hours after an injury.  · Wrap an ice pack or a bag of frozen peas in a thin towel. (Never place ice directly on your skin.)  · Place the ice where your back hurts the most.  · Dont ice for more than 20 minutes at a time.  · You can use ice several times a day.  ? Medicines  Over-the-counter pain relievers can include acetaminophen and anti-inflammatory medicines, which includes aspirin or ibuprofen. They can help ease discomfort. Some also reduce swelling.  · Tell your healthcare provider about any medicines you are already taking.  · Take medicines only as directed.  ? Heat  After the first 48 hours, heat can relax sore muscles and improve blood  flow.  · Try a warm bath or shower. Or use a heating pad set on low. To prevent a burn, keep a cloth between you and the heating pad.  · Dont use a heating pad for more than 15 minutes at a time. Never sleep on a heating pad.  Date Last Reviewed: 9/1/2015  © 5177-4668 [employer]. [address] [place] [address], [place] [address]. All rights reserved. This information is not intended as a substitute for professional medical care. Always follow your healthcare professional's instructions.

## 2019-11-13 ENCOUNTER — PATIENT OUTREACH (OUTPATIENT)
Dept: OTHER | Facility: OTHER | Age: 74
End: 2019-11-13

## 2019-11-14 ENCOUNTER — OFFICE VISIT (OUTPATIENT)
Dept: CARDIOLOGY | Facility: CLINIC | Age: 74
End: 2019-11-14
Payer: MEDICARE

## 2019-11-14 ENCOUNTER — LAB VISIT (OUTPATIENT)
Dept: LAB | Facility: HOSPITAL | Age: 74
End: 2019-11-14
Payer: MEDICARE

## 2019-11-14 VITALS
OXYGEN SATURATION: 97 % | BODY MASS INDEX: 40.77 KG/M2 | SYSTOLIC BLOOD PRESSURE: 123 MMHG | HEIGHT: 62 IN | HEART RATE: 94 BPM | DIASTOLIC BLOOD PRESSURE: 75 MMHG | WEIGHT: 221.56 LBS

## 2019-11-14 DIAGNOSIS — E78.2 MIXED HYPERLIPIDEMIA: ICD-10-CM

## 2019-11-14 DIAGNOSIS — I10 ESSENTIAL HYPERTENSION: ICD-10-CM

## 2019-11-14 DIAGNOSIS — E66.01 MORBID OBESITY WITH BODY MASS INDEX (BMI) OF 40.0 OR HIGHER: ICD-10-CM

## 2019-11-14 DIAGNOSIS — Z95.5 S/P DRUG ELUTING CORONARY STENT PLACEMENT: ICD-10-CM

## 2019-11-14 DIAGNOSIS — N18.30 CKD (CHRONIC KIDNEY DISEASE) STAGE 3, GFR 30-59 ML/MIN: ICD-10-CM

## 2019-11-14 DIAGNOSIS — N18.30 TYPE 2 DIABETES MELLITUS WITH STAGE 3 CHRONIC KIDNEY DISEASE AND HYPERTENSION: ICD-10-CM

## 2019-11-14 DIAGNOSIS — I21.4 NSTEMI (NON-ST ELEVATED MYOCARDIAL INFARCTION): Primary | ICD-10-CM

## 2019-11-14 DIAGNOSIS — Z95.1 S/P CABG (CORONARY ARTERY BYPASS GRAFT): ICD-10-CM

## 2019-11-14 DIAGNOSIS — E11.22 TYPE 2 DIABETES MELLITUS WITH STAGE 3 CHRONIC KIDNEY DISEASE AND HYPERTENSION: ICD-10-CM

## 2019-11-14 DIAGNOSIS — I12.9 TYPE 2 DIABETES MELLITUS WITH STAGE 3 CHRONIC KIDNEY DISEASE AND HYPERTENSION: ICD-10-CM

## 2019-11-14 DIAGNOSIS — I25.810 CORONARY ARTERY DISEASE INVOLVING CORONARY BYPASS GRAFT OF NATIVE HEART WITHOUT ANGINA PECTORIS: ICD-10-CM

## 2019-11-14 LAB
ANION GAP SERPL CALC-SCNC: 10 MMOL/L (ref 8–16)
BUN SERPL-MCNC: 14 MG/DL (ref 8–23)
CALCIUM SERPL-MCNC: 10.2 MG/DL (ref 8.7–10.5)
CHLORIDE SERPL-SCNC: 104 MMOL/L (ref 95–110)
CO2 SERPL-SCNC: 25 MMOL/L (ref 23–29)
CREAT SERPL-MCNC: 1.1 MG/DL (ref 0.5–1.4)
EST. GFR  (AFRICAN AMERICAN): 58 ML/MIN/1.73 M^2
EST. GFR  (NON AFRICAN AMERICAN): 50.3 ML/MIN/1.73 M^2
GLUCOSE SERPL-MCNC: 50 MG/DL (ref 70–110)
POTASSIUM SERPL-SCNC: 3.7 MMOL/L (ref 3.5–5.1)
SODIUM SERPL-SCNC: 139 MMOL/L (ref 136–145)

## 2019-11-14 PROCEDURE — 36415 COLL VENOUS BLD VENIPUNCTURE: CPT

## 2019-11-14 PROCEDURE — 1101F PR PT FALLS ASSESS DOC 0-1 FALLS W/OUT INJ PAST YR: ICD-10-PCS | Mod: CPTII,S$GLB,, | Performed by: INTERNAL MEDICINE

## 2019-11-14 PROCEDURE — 99999 PR PBB SHADOW E&M-EST. PATIENT-LVL III: CPT | Mod: PBBFAC,,, | Performed by: INTERNAL MEDICINE

## 2019-11-14 PROCEDURE — 99499 UNLISTED E&M SERVICE: CPT | Mod: S$GLB,,, | Performed by: INTERNAL MEDICINE

## 2019-11-14 PROCEDURE — 99999 PR PBB SHADOW E&M-EST. PATIENT-LVL III: ICD-10-PCS | Mod: PBBFAC,,, | Performed by: INTERNAL MEDICINE

## 2019-11-14 PROCEDURE — 3078F DIAST BP <80 MM HG: CPT | Mod: CPTII,S$GLB,, | Performed by: INTERNAL MEDICINE

## 2019-11-14 PROCEDURE — 3044F HG A1C LEVEL LT 7.0%: CPT | Mod: CPTII,S$GLB,, | Performed by: INTERNAL MEDICINE

## 2019-11-14 PROCEDURE — 3078F PR MOST RECENT DIASTOLIC BLOOD PRESSURE < 80 MM HG: ICD-10-PCS | Mod: CPTII,S$GLB,, | Performed by: INTERNAL MEDICINE

## 2019-11-14 PROCEDURE — 1101F PT FALLS ASSESS-DOCD LE1/YR: CPT | Mod: CPTII,S$GLB,, | Performed by: INTERNAL MEDICINE

## 2019-11-14 PROCEDURE — 3074F SYST BP LT 130 MM HG: CPT | Mod: CPTII,S$GLB,, | Performed by: INTERNAL MEDICINE

## 2019-11-14 PROCEDURE — 99213 OFFICE O/P EST LOW 20 MIN: CPT | Mod: S$GLB,,, | Performed by: INTERNAL MEDICINE

## 2019-11-14 PROCEDURE — 3044F PR MOST RECENT HEMOGLOBIN A1C LEVEL <7.0%: ICD-10-PCS | Mod: CPTII,S$GLB,, | Performed by: INTERNAL MEDICINE

## 2019-11-14 PROCEDURE — 80048 BASIC METABOLIC PNL TOTAL CA: CPT

## 2019-11-14 PROCEDURE — 99499 RISK ADDL DX/OHS AUDIT: ICD-10-PCS | Mod: S$GLB,,, | Performed by: INTERNAL MEDICINE

## 2019-11-14 PROCEDURE — 3074F PR MOST RECENT SYSTOLIC BLOOD PRESSURE < 130 MM HG: ICD-10-PCS | Mod: CPTII,S$GLB,, | Performed by: INTERNAL MEDICINE

## 2019-11-14 PROCEDURE — 99213 PR OFFICE/OUTPT VISIT, EST, LEVL III, 20-29 MIN: ICD-10-PCS | Mod: S$GLB,,, | Performed by: INTERNAL MEDICINE

## 2019-11-14 NOTE — PROGRESS NOTES
Subjective:   Patient ID:  Kaylee Romero is a 74 y.o. female who presents for follow up of NSTEMI s/p PCI.    HPI:   74F with CAD s/p NSTEMI 07/2019 and CABG x 2 08/12/2019 (LIMA to LAD and SVG to OM), IDDM, HTN, HLD, obesity, KAMRAN on CPAP, CKD who presents for follow post hospitalization for NSTEMI  with PCI with BEA to mid LAD and prox RCA. Since that time she states she has been in recovery but has yest to start cardiac rehab. She denies any further chest pain on exertion but states again, her activity level is completely back to her baseline. Denies GRANADO, orthopnea and states she no longer has any lower extremity edema. She states she has been compliant with her plavix and ASA. Her and her  have lots of questions regarding her intervention and are concerned for recurrent episodes.    [facility] 10/28/19  · Three vessel coronary artery disease.  · Successful PCI.  · Patent LIMA to LAD, however this provides only the apical LAD and is a very small vessel  · Patent SVG to OM with 90% distal anastomotic stenosis  · Successful PCI with BEA to mid LAD which provides large diagonal branch not bypassed  · Successful PCI with BEA to proximal RCA which appeared to be the cullprit vessel  · Estimated blood loss: <50 mL    TTE 10/27/19  · Concentric left ventricular remodeling. Increased (hyperdynamic) left ventricular systolic function. The estimated ejection fraction is 73%  · Normal right ventricular systolic function.  · Normal LV diastolic function.  · Normal central venous pressure (3 mm Hg).  · The estimated PA systolic pressure is 32 mm Hg        Review of Systems   Constitution: Negative for chills and fever.   Cardiovascular: Negative for chest pain, dyspnea on exertion, irregular heartbeat, leg swelling, orthopnea, paroxysmal nocturnal dyspnea and syncope.   Respiratory: Negative for shortness of breath.    Gastrointestinal: Negative for abdominal pain, nausea and vomiting.       Past Medical History:    Diagnosis Date    Acid reflux     Age-related osteoporosis without current pathological fracture 2019    Cataract     CKD (chronic kidney disease) stage 3, GFR 30-59 ml/min     Dry mouth     History of TIA (transient ischemic attack) 2018    Hyperlipidemia 2014    Hypertensive heart disease with diastolic heart failure 2012    Morbid obesity with body mass index (BMI) of 40.0 or higher 2016    KAMRAN (obstructive sleep apnea)     KAMRAN on CPAP 2016    Osteoporosis without current pathological fracture 2018    Physical deconditioning 2018    Status post total left knee replacement 2017    Type 2 diabetes mellitus with stage 3 chronic kidney disease, without long-term current use of insulin 2019       Past Surgical History:   Procedure Laterality Date    ankle surgery (l)      APPENDECTOMY       SECTION      CORONARY ARTERY BYPASS GRAFT  09/2019    x 2    CORONARY ARTERY BYPASS GRAFT (CABG) N/A 2019    Procedure: CORONARY ARTERY BYPASS GRAFT (CABG)X3;  Surgeon: Peterson Ventura MD;  Location: [facility] OR [address] [place] [address];  Service: Cardiovascular;  Laterality: N/A;    DILATION AND CURETTAGE OF UTERUS      ENDOSCOPIC HARVEST OF VEIN Left 2019    Procedure: SURGICAL PROCUREMENT, VEIN, ENDOSCOPIC;  Surgeon: Peterson Ventura MD;  Location: [facility] OR [address] [place] [address];  Service: Cardiovascular;  Laterality: Left;  Vein [place] Start: 843; End: 105   Vein Prep Start: 105; End: 1145    KNEE ARTHROSCOPY W/ DEBRIDEMENT      KNEE SURGERY Left 2017    TKR    LEFT HEART CATHETERIZATION Left 2019    Procedure: Left heart cath;  Surgeon: Ronak Gibbons MD;  Location: [facility] CATH LAB;  Service: Cardiology;  Laterality: Left;       Social History     Tobacco Use    Smoking status: Never Smoker    Smokeless tobacco: Never Used   Substance Use Topics    Alcohol use: Yes     Alcohol/week: 1.0 standard drinks     Types: 1 Shots of liquor  per week     Comment: social drinker    Drug use: No       Family History   Problem Relation Age of Onset    Heart disease Mother     Heart failure Mother     Heart disease Father     Stroke Father     Heart failure Father     Diabetes Father     Stroke Paternal Grandfather     No Known Problems Brother     No Known Problems Son     Breast cancer Neg Hx     Colon cancer Neg Hx     Ovarian cancer Neg Hx     Amblyopia Neg Hx     Blindness Neg Hx     Cancer Neg Hx     Cataracts Neg Hx     Glaucoma Neg Hx     Hypertension Neg Hx     Macular degeneration Neg Hx     Retinal detachment Neg Hx     Strabismus Neg Hx     Thyroid disease Neg Hx        Current Outpatient Medications   Medication Sig    alendronate (FOSAMAX) 70 MG tablet Take 1 tablet (70 mg total) by mouth every 7 days. Take with a full glass of water & remain upright for at least 30 minutes    amLODIPine (NORVASC) 10 MG tablet Take 1 tablet (10 mg total) by mouth once daily.    aspirin 81 MG Chew Take 1 tablet (81 mg total) by mouth once daily.    atorvastatin (LIPITOR) 80 MG tablet Take 1 tablet (80 mg total) by mouth once daily.    carvedilol (COREG) 6.25 MG tablet Take 1 tablet (6.25 mg total) by mouth 2 (two) times daily with meals.    [START ON 11/27/2019] clopidogrel (PLAVIX) 75 mg tablet Take 1 tablet (75 mg total) by mouth once daily.    ferrous sulfate (FEOSOL) 325 mg (65 mg iron) Tab tablet Take 1 tablet (325 mg total) by mouth once daily.    glipiZIDE (GLUCOTROL) 10 MG tablet Take 2 tablets (20 mg total) by mouth daily with breakfast.    insulin detemir U-100 (LEVEMIR FLEXTOUCH) 100 unit/mL (3 mL) SubQ InPn pen Inject 15 Units into the skin every evening.    multivitamin (ONE DAILY MULTIVITAMIN) per tablet Take 1 tablet by mouth once daily.    orphenadrine (NORFLEX) 100 mg tablet Take 1 tablet (100 mg total) by mouth 2 (two) times daily. for 7 days    valsartan (DIOVAN) 320 MG tablet Take 1 tablet (320 mg total) by  "mouth once daily.    alcohol swabs (ALCOHOL PADS) PadM Apply 1 each topically as needed.    blood sugar diagnostic Strp 1 strip by Misc.(Non-Drug; Combo Route) route once daily.    clopidogrel (PLAVIX) 75 mg tablet Take 8 tablets (600mg total) by mouth the morning of 10/30/2019. Then take 1 tablet (75mg) by mouth once daily thereafter. (Patient not taking: Reported on 11/14/2019)    diclofenac sodium (VOLTAREN) 1 % Gel Apply 2 g topically 4 (four) times daily.    flu vacc [id number],65yr up,PF (FLUZONE HIGH-DOSE 2019-20, PF,) 180 mcg/0.5 mL Syrg inject 0.5 ml into the muscle for one dose (Patient not taking: Reported on 11/14/2019)    lancets Misc 1 lancet by Misc.(Non-Drug; Combo Route) route once daily.    pen needle, diabetic 31 gauge x 5/16" Ndle Use every evening.     No current facility-administered medications for this visit.        Review of patient's allergies indicates:   Allergen Reactions    Bactrim [sulfamethoxazole-trimethoprim] Other (See Comments)     Generic version  Sulfa makes her sick    Keflex [cephalexin] Other (See Comments)     Turns orange       Objective:     /75 (BP Location: Right arm, Patient Position: Sitting, BP Method: X-Large (Automatic))   Pulse 94   Ht 5' 2" (1.575 m)   Wt 100.5 kg (221 lb 9 oz)   LMP 01/09/2001 (Approximate)   SpO2 97%   BMI 40.52 kg/m²     Physical Exam  Vital signs reviewed  Constitutional: Oriented to person, place, and time. Appears  well-developed and well-nourished.   HENT:   Head: Normocephalic and atraumatic.   Eyes: EOM are normal.   Neck: Normal range of motion. No JVD present.   Cardiovascular: Normal rate, regular rhythm, normal heart sounds.Exam reveals no gallop and no friction rub.   No murmur heard.  Pulmonary/Chest: Effort normal and breath sounds normal. No wheeze or rales present. No chest wall tenderness  Abdominal: Soft. Bowel sounds are normal. There is no tenderness. There is no guarding.   Musculoskeletal: There is no " edema present   Skin: Skin is warm and dry.          Chemistry        Component Value Date/Time     10/29/2019 0334    K 3.8 10/29/2019 0334     10/29/2019 0334    CO2 23 10/29/2019 0334    BUN 18 10/29/2019 0334    CREATININE 1.2 10/29/2019 0334     (H) 10/29/2019 0334        Component Value Date/Time    CALCIUM 9.1 10/29/2019 0334    ALKPHOS 109 10/26/2019 0052    AST 29 10/26/2019 0052    ALT 28 10/26/2019 0052    BILITOT 0.2 10/26/2019 0052    ESTGFRAFRICA 52.2 (A) 10/29/2019 0334    EGFRNONAA 45.3 (A) 10/29/2019 0334            Lab Results   Component Value Date    CHOL 134 07/09/2019    CHOL 145 04/11/2019    CHOL 165 04/03/2018     Lab Results   Component Value Date    HDL 43 07/09/2019    HDL 49 04/11/2019    HDL 44 04/03/2018     Lab Results   Component Value Date    LDLCALC 70.8 07/09/2019    LDLCALC 83.2 04/11/2019    LDLCALC 106.4 04/03/2018     Lab Results   Component Value Date    TRIG 101 07/09/2019    TRIG 64 04/11/2019    TRIG 73 04/03/2018     Lab Results   Component Value Date    CHOLHDL 32.1 07/09/2019    CHOLHDL 33.8 04/11/2019    CHOLHDL 26.7 04/03/2018         Lab Results   Component Value Date     10/29/2019    K 3.8 10/29/2019     10/29/2019    CO2 23 10/29/2019    BUN 18 10/29/2019    CREATININE 1.2 10/29/2019     (H) 10/29/2019    HGBA1C 6.6 (H) 10/26/2019    MG 1.7 10/29/2019    AST 29 10/26/2019    ALT 28 10/26/2019    ALBUMIN 3.7 10/26/2019    PROT 9.1 (H) 10/26/2019    BILITOT 0.2 10/26/2019    WBC 6.82 10/29/2019    HGB 9.8 (L) 10/29/2019    HCT 31.9 (L) 10/29/2019    HCT 25 (L) 08/12/2019    MCV 86 10/29/2019     10/29/2019    INR 1.0 10/26/2019    TSH 2.307 05/23/2019    CHOL 134 07/09/2019    HDL 43 07/09/2019    LDLCALC 70.8 07/09/2019    TRIG 101 07/09/2019     Assessment:     1. NSTEMI (non-ST elevated myocardial infarction)    2. S/P CABG (coronary artery bypass graft)    3. Coronary artery disease involving coronary bypass graft of  native heart without angina pectoris    4. CKD (chronic kidney disease) stage 3, GFR 30-59 ml/min    5. Mixed hyperlipidemia    6. Essential hypertension    7. Type 2 diabetes mellitus with stage 3 chronic kidney disease and hypertension    8. Morbid obesity with body mass index (BMI) of 40.0 or higher        Plan:   NSTEMI s/p PCI   - successful PCI of the mid LAD and prox RCA during last admission with noted residual 70% lesions of the OM1  - 90% stenosis of distal anastomosis of the SVG to OM  - continue guideline therapy of ASA, statin, Plavix and not to miss doses of Plavix over the next year  - discussed the importance of getting to cardiac rehab    CAD   - continue ASA, Plavix,& Atorvastatin  - as per above    HTN  - pressure controlled today at 123/75  - continue Coreg, Amlodipine, and Valsartan    Morbid Obesity  - needs to begin cardiac rehab and hopefully transition to regular exercise program  - encourage diet of vegetables, low sodium    Dyslipidemia. Continue statin      Romelia Granado MD  Cardiology Fellow    I have personally taken the history and examined this patient and agree with the resident's note as stated above.  All of the patient's questions were answered.

## 2019-11-17 ENCOUNTER — PATIENT MESSAGE (OUTPATIENT)
Dept: PRIMARY CARE CLINIC | Facility: CLINIC | Age: 74
End: 2019-11-17

## 2019-11-18 NOTE — TELEPHONE ENCOUNTER
Pt informed of results pt state she don't get those low readings too often . She will bring her glucose reading to the clinic visit

## 2019-11-18 NOTE — TELEPHONE ENCOUNTER
Lab shows stable kidney function, but blood sugar low at 50 on the day the lab was drawn at 4:00 pm. Does this happen often? Bring glucose log to visit coming up soon.

## 2019-11-19 NOTE — PROGRESS NOTES
11/19: Patient has PCP appt on 11/21.  Per note on 11/17, PCP would like to review BG readings in office.  Will follow up next week

## 2019-11-20 ENCOUNTER — PATIENT OUTREACH (OUTPATIENT)
Dept: OTHER | Facility: OTHER | Age: 74
End: 2019-11-20

## 2019-11-20 ENCOUNTER — OFFICE VISIT (OUTPATIENT)
Dept: CARDIOLOGY | Facility: CLINIC | Age: 74
End: 2019-11-20
Payer: MEDICARE

## 2019-11-20 VITALS
HEIGHT: 62 IN | SYSTOLIC BLOOD PRESSURE: 155 MMHG | WEIGHT: 222.88 LBS | HEART RATE: 91 BPM | DIASTOLIC BLOOD PRESSURE: 72 MMHG | BODY MASS INDEX: 41.02 KG/M2

## 2019-11-20 DIAGNOSIS — I25.810 CORONARY ARTERY DISEASE INVOLVING CORONARY BYPASS GRAFT OF NATIVE HEART WITHOUT ANGINA PECTORIS: Primary | ICD-10-CM

## 2019-11-20 DIAGNOSIS — N18.30 CKD (CHRONIC KIDNEY DISEASE) STAGE 3, GFR 30-59 ML/MIN: ICD-10-CM

## 2019-11-20 DIAGNOSIS — N18.30 TYPE 2 DIABETES MELLITUS WITH STAGE 3 CHRONIC KIDNEY DISEASE AND HYPERTENSION: ICD-10-CM

## 2019-11-20 DIAGNOSIS — I21.4 NSTEMI (NON-ST ELEVATED MYOCARDIAL INFARCTION): ICD-10-CM

## 2019-11-20 DIAGNOSIS — E11.22 TYPE 2 DIABETES MELLITUS WITH STAGE 3 CHRONIC KIDNEY DISEASE AND HYPERTENSION: ICD-10-CM

## 2019-11-20 DIAGNOSIS — E78.2 MIXED HYPERLIPIDEMIA: ICD-10-CM

## 2019-11-20 DIAGNOSIS — Z95.5 S/P DRUG ELUTING CORONARY STENT PLACEMENT: ICD-10-CM

## 2019-11-20 DIAGNOSIS — Z95.1 S/P CABG (CORONARY ARTERY BYPASS GRAFT): ICD-10-CM

## 2019-11-20 DIAGNOSIS — E66.01 MORBID OBESITY WITH BODY MASS INDEX (BMI) OF 40.0 OR HIGHER: ICD-10-CM

## 2019-11-20 DIAGNOSIS — I10 ESSENTIAL HYPERTENSION: ICD-10-CM

## 2019-11-20 DIAGNOSIS — I12.9 TYPE 2 DIABETES MELLITUS WITH STAGE 3 CHRONIC KIDNEY DISEASE AND HYPERTENSION: ICD-10-CM

## 2019-11-20 PROCEDURE — 3044F PR MOST RECENT HEMOGLOBIN A1C LEVEL <7.0%: ICD-10-PCS | Mod: CPTII,GC,S$GLB, | Performed by: STUDENT IN AN ORGANIZED HEALTH CARE EDUCATION/TRAINING PROGRAM

## 2019-11-20 PROCEDURE — 3077F PR MOST RECENT SYSTOLIC BLOOD PRESSURE >= 140 MM HG: ICD-10-PCS | Mod: CPTII,GC,S$GLB, | Performed by: STUDENT IN AN ORGANIZED HEALTH CARE EDUCATION/TRAINING PROGRAM

## 2019-11-20 PROCEDURE — 99499 UNLISTED E&M SERVICE: CPT | Mod: S$GLB,,, | Performed by: STUDENT IN AN ORGANIZED HEALTH CARE EDUCATION/TRAINING PROGRAM

## 2019-11-20 PROCEDURE — 3078F DIAST BP <80 MM HG: CPT | Mod: CPTII,GC,S$GLB, | Performed by: STUDENT IN AN ORGANIZED HEALTH CARE EDUCATION/TRAINING PROGRAM

## 2019-11-20 PROCEDURE — 1159F PR MEDICATION LIST DOCUMENTED IN MEDICAL RECORD: ICD-10-PCS | Mod: GC,S$GLB,, | Performed by: STUDENT IN AN ORGANIZED HEALTH CARE EDUCATION/TRAINING PROGRAM

## 2019-11-20 PROCEDURE — 1159F MED LIST DOCD IN RCRD: CPT | Mod: GC,S$GLB,, | Performed by: STUDENT IN AN ORGANIZED HEALTH CARE EDUCATION/TRAINING PROGRAM

## 2019-11-20 PROCEDURE — 99214 OFFICE O/P EST MOD 30 MIN: CPT | Mod: GC,S$GLB,, | Performed by: STUDENT IN AN ORGANIZED HEALTH CARE EDUCATION/TRAINING PROGRAM

## 2019-11-20 PROCEDURE — 1101F PR PT FALLS ASSESS DOC 0-1 FALLS W/OUT INJ PAST YR: ICD-10-PCS | Mod: CPTII,GC,S$GLB, | Performed by: STUDENT IN AN ORGANIZED HEALTH CARE EDUCATION/TRAINING PROGRAM

## 2019-11-20 PROCEDURE — 99499 RISK ADDL DX/OHS AUDIT: ICD-10-PCS | Mod: S$GLB,,, | Performed by: STUDENT IN AN ORGANIZED HEALTH CARE EDUCATION/TRAINING PROGRAM

## 2019-11-20 PROCEDURE — 99999 PR PBB SHADOW E&M-EST. PATIENT-LVL V: CPT | Mod: PBBFAC,GC,, | Performed by: STUDENT IN AN ORGANIZED HEALTH CARE EDUCATION/TRAINING PROGRAM

## 2019-11-20 PROCEDURE — 3077F SYST BP >= 140 MM HG: CPT | Mod: CPTII,GC,S$GLB, | Performed by: STUDENT IN AN ORGANIZED HEALTH CARE EDUCATION/TRAINING PROGRAM

## 2019-11-20 PROCEDURE — 1126F PR PAIN SEVERITY QUANTIFIED, NO PAIN PRESENT: ICD-10-PCS | Mod: GC,S$GLB,, | Performed by: STUDENT IN AN ORGANIZED HEALTH CARE EDUCATION/TRAINING PROGRAM

## 2019-11-20 PROCEDURE — 3078F PR MOST RECENT DIASTOLIC BLOOD PRESSURE < 80 MM HG: ICD-10-PCS | Mod: CPTII,GC,S$GLB, | Performed by: STUDENT IN AN ORGANIZED HEALTH CARE EDUCATION/TRAINING PROGRAM

## 2019-11-20 PROCEDURE — 3044F HG A1C LEVEL LT 7.0%: CPT | Mod: CPTII,GC,S$GLB, | Performed by: STUDENT IN AN ORGANIZED HEALTH CARE EDUCATION/TRAINING PROGRAM

## 2019-11-20 PROCEDURE — 1126F AMNT PAIN NOTED NONE PRSNT: CPT | Mod: GC,S$GLB,, | Performed by: STUDENT IN AN ORGANIZED HEALTH CARE EDUCATION/TRAINING PROGRAM

## 2019-11-20 PROCEDURE — 1101F PT FALLS ASSESS-DOCD LE1/YR: CPT | Mod: CPTII,GC,S$GLB, | Performed by: STUDENT IN AN ORGANIZED HEALTH CARE EDUCATION/TRAINING PROGRAM

## 2019-11-20 PROCEDURE — 99214 PR OFFICE/OUTPT VISIT, EST, LEVL IV, 30-39 MIN: ICD-10-PCS | Mod: GC,S$GLB,, | Performed by: STUDENT IN AN ORGANIZED HEALTH CARE EDUCATION/TRAINING PROGRAM

## 2019-11-20 PROCEDURE — 99999 PR PBB SHADOW E&M-EST. PATIENT-LVL V: ICD-10-PCS | Mod: PBBFAC,GC,, | Performed by: STUDENT IN AN ORGANIZED HEALTH CARE EDUCATION/TRAINING PROGRAM

## 2019-11-20 RX ORDER — NITROGLYCERIN 0.4 MG/1
0.4 TABLET SUBLINGUAL EVERY 5 MIN PRN
Qty: 30 TABLET | Refills: 1 | Status: SHIPPED | OUTPATIENT
Start: 2019-11-20 | End: 2020-02-05 | Stop reason: SDUPTHER

## 2019-11-20 RX ORDER — CARVEDILOL 12.5 MG/1
12.5 TABLET ORAL 2 TIMES DAILY WITH MEALS
Qty: 60 TABLET | Refills: 11 | Status: SHIPPED | OUTPATIENT
Start: 2019-11-20 | End: 2020-01-31

## 2019-11-20 NOTE — PROGRESS NOTES
Subjective:   Patient ID:  Kaylee Romero is a 74 y.o. female who presents for follow up of NSTEMI s/p PCI.    HPI:   74F with CAD s/p NSTEMI 07/2019, CABG x 2 08/12/2019 (LIMA to LAD and SVG to OM), NSTEMI 10/2018 s/p PCI/BEA mid LAD and proximal RCA (suspected culprit), IDDM, HTN, HLD, obesity, KAMRAN on CPAP, CKD who presents for follow up. She was seen for post PCI visit with interventional last week. Doing well at that time without issue, encouraged enrollment in cardiac rehab.   Still doing well today. Still with a number of questions regarding her intervention as noted in previous visit and small vessel that was un-revascularized. Asymptomatic but mostly sedentary. Not yet in cardiac rehab. Denies chest pain, palpitations, dyspnea, pleurisy, cough, PND, orthopnea, JONAH, claudication, presyncope/syncope. Compliant with medications.      [facility] 10/28/19  · Three vessel coronary artery disease.  · Successful PCI.  · Patent LIMA to LAD, however this provides only the apical LAD and is a very small vessel  · Patent SVG to OM with 90% distal anastomotic stenosis  · Successful PCI with BEA to mid LAD which provides large diagonal branch not bypassed  · Successful PCI with BEA to proximal RCA which appeared to be the cullprit vessel  · Estimated blood loss: <50 mL    TTE 10/27/19  · Concentric left ventricular remodeling. Increased (hyperdynamic) left ventricular systolic function. The estimated ejection fraction is 73%  · Normal right ventricular systolic function.  · Normal LV diastolic function.  · Normal central venous pressure (3 mm Hg).  · The estimated PA systolic pressure is 32 mm Hg    Review of Systems   Constitution: Negative for chills and fever.   Cardiovascular: Negative for chest pain, dyspnea on exertion, irregular heartbeat, leg swelling, orthopnea, paroxysmal nocturnal dyspnea and syncope.   Respiratory: Negative for shortness of breath.    Gastrointestinal: Negative for abdominal pain, nausea and  vomiting.       Past Medical History:   Diagnosis Date    Acid reflux     Age-related osteoporosis without current pathological fracture 2019    Cataract     CKD (chronic kidney disease) stage 3, GFR 30-59 ml/min     Dry mouth     History of TIA (transient ischemic attack) 2018    Hyperlipidemia 2014    Hypertensive heart disease with diastolic heart failure 2012    Morbid obesity with body mass index (BMI) of 40.0 or higher 2016    KAMRAN (obstructive sleep apnea)     KAMRAN on CPAP 2016    Osteoporosis without current pathological fracture 2018    Physical deconditioning 2018    Status post total left knee replacement 2017    Type 2 diabetes mellitus with stage 3 chronic kidney disease, without long-term current use of insulin 2019       Past Surgical History:   Procedure Laterality Date    ankle surgery (l)      APPENDECTOMY       SECTION      CORONARY ARTERY BYPASS GRAFT  09/2019    x 2    CORONARY ARTERY BYPASS GRAFT (CABG) N/A 2019    Procedure: CORONARY ARTERY BYPASS GRAFT (CABG)X3;  Surgeon: Peterson Ventura MD;  Location: [facility] OR [address] [place] [address];  Service: Cardiovascular;  Laterality: N/A;    DILATION AND CURETTAGE OF UTERUS      ENDOSCOPIC HARVEST OF VEIN Left 2019    Procedure: SURGICAL PROCUREMENT, VEIN, ENDOSCOPIC;  Surgeon: Peterson Ventura MD;  Location: [facility] OR [address] [place] [address];  Service: Cardiovascular;  Laterality: Left;  Vein [place] Start: 08; End: 1054   Vein Prep Start: 1055; End: 1145    KNEE ARTHROSCOPY W/ DEBRIDEMENT      KNEE SURGERY Left 2017    TKR    LEFT HEART CATHETERIZATION Left 2019    Procedure: Left heart cath;  Surgeon: Ronak Gibbons MD;  Location: [facility] CATH LAB;  Service: Cardiology;  Laterality: Left;       Social History     Tobacco Use    Smoking status: Never Smoker    Smokeless tobacco: Never Used   Substance Use Topics    Alcohol use: Yes     Alcohol/week: 1.0  standard drinks     Types: 1 Shots of liquor per week     Comment: social drinker    Drug use: No       Family History   Problem Relation Age of Onset    Heart disease Mother     Heart failure Mother     Heart disease Father     Stroke Father     Heart failure Father     Diabetes Father     Stroke Paternal Grandfather     No Known Problems Brother     No Known Problems Son     Breast cancer Neg Hx     Colon cancer Neg Hx     Ovarian cancer Neg Hx     Amblyopia Neg Hx     Blindness Neg Hx     Cancer Neg Hx     Cataracts Neg Hx     Glaucoma Neg Hx     Hypertension Neg Hx     Macular degeneration Neg Hx     Retinal detachment Neg Hx     Strabismus Neg Hx     Thyroid disease Neg Hx        Current Outpatient Medications   Medication Sig    alcohol swabs (ALCOHOL PADS) PadM Apply 1 each topically as needed.    alendronate (FOSAMAX) 70 MG tablet Take 1 tablet (70 mg total) by mouth every 7 days. Take with a full glass of water & remain upright for at least 30 minutes    amLODIPine (NORVASC) 10 MG tablet Take 1 tablet (10 mg total) by mouth once daily.    aspirin 81 MG Chew Take 1 tablet (81 mg total) by mouth once daily.    atorvastatin (LIPITOR) 80 MG tablet Take 1 tablet (80 mg total) by mouth once daily.    blood sugar diagnostic Strp 1 strip by Misc.(Non-Drug; Combo Route) route once daily.    carvedilol (COREG) 6.25 MG tablet Take 1 tablet (6.25 mg total) by mouth 2 (two) times daily with meals.    clopidogrel (PLAVIX) 75 mg tablet Take 8 tablets (600mg total) by mouth the morning of 10/30/2019. Then take 1 tablet (75mg) by mouth once daily thereafter. (Patient not taking: Reported on 11/14/2019)    [START ON 11/27/2019] clopidogrel (PLAVIX) 75 mg tablet Take 1 tablet (75 mg total) by mouth once daily.    diclofenac sodium (VOLTAREN) 1 % Gel Apply 2 g topically 4 (four) times daily.    ferrous sulfate (FEOSOL) 325 mg (65 mg iron) Tab tablet Take 1 tablet (325 mg total) by mouth once  "daily.    flu vacc [id number],65yr up,PF (FLUZONE HIGH-DOSE 2019-20, PF,) 180 mcg/0.5 mL Syrg inject 0.5 ml into the muscle for one dose (Patient not taking: Reported on 11/14/2019)    glipiZIDE (GLUCOTROL) 10 MG tablet Take 2 tablets (20 mg total) by mouth daily with breakfast.    insulin detemir U-100 (LEVEMIR FLEXTOUCH) 100 unit/mL (3 mL) SubQ InPn pen Inject 15 Units into the skin every evening.    lancets Misc 1 lancet by Misc.(Non-Drug; Combo Route) route once daily.    multivitamin (ONE DAILY MULTIVITAMIN) per tablet Take 1 tablet by mouth once daily.    pen needle, diabetic 31 gauge x 5/16" Ndle Use every evening.    valsartan (DIOVAN) 320 MG tablet Take 1 tablet (320 mg total) by mouth once daily.     No current facility-administered medications for this visit.        Review of patient's allergies indicates:   Allergen Reactions    Bactrim [sulfamethoxazole-trimethoprim] Other (See Comments)     Generic version  Sulfa makes her sick    Keflex [cephalexin] Other (See Comments)     Turns orange       Objective:     LMP 01/09/2001 (Approximate)     Physical Exam  Vital signs reviewed  Constitutional: Oriented to person, place, and time. Appears  well-developed and well-nourished.   HENT:   Head: Normocephalic and atraumatic.   Eyes: EOM are normal.   Neck: Normal range of motion. No JVD present.   Cardiovascular: Normal rate, regular rhythm, normal heart sounds. 2/6 systolic flow murmur. Exam reveals no gallop and no friction rub.   Pulmonary/Chest: Effort normal and breath sounds normal. No wheeze or rales present. No chest wall tenderness  Abdominal: Soft. Bowel sounds are normal. There is no tenderness. There is no guarding.   Musculoskeletal: There is no edema present   Skin: Skin is warm and dry.          Chemistry        Component Value Date/Time     11/14/2019 1608    K 3.7 11/14/2019 1608     11/14/2019 1608    CO2 25 11/14/2019 1608    BUN 14 11/14/2019 1608    CREATININE 1.1 " 11/14/2019 1608    GLU 50 (L) 11/14/2019 1608        Component Value Date/Time    CALCIUM 10.2 11/14/2019 1608    ALKPHOS 109 10/26/2019 0052    AST 29 10/26/2019 0052    ALT 28 10/26/2019 0052    BILITOT 0.2 10/26/2019 0052    ESTGFRAFRICA 58.0 (A) 11/14/2019 1608    EGFRNONAA 50.3 (A) 11/14/2019 1608            Lab Results   Component Value Date    CHOL 134 07/09/2019    CHOL 145 04/11/2019    CHOL 165 04/03/2018     Lab Results   Component Value Date    HDL 43 07/09/2019    HDL 49 04/11/2019    HDL 44 04/03/2018     Lab Results   Component Value Date    LDLCALC 70.8 07/09/2019    LDLCALC 83.2 04/11/2019    LDLCALC 106.4 04/03/2018     Lab Results   Component Value Date    TRIG 101 07/09/2019    TRIG 64 04/11/2019    TRIG 73 04/03/2018     Lab Results   Component Value Date    CHOLHDL 32.1 07/09/2019    CHOLHDL 33.8 04/11/2019    CHOLHDL 26.7 04/03/2018         Lab Results   Component Value Date     11/14/2019    K 3.7 11/14/2019     11/14/2019    CO2 25 11/14/2019    BUN 14 11/14/2019    CREATININE 1.1 11/14/2019    GLU 50 (L) 11/14/2019    HGBA1C 6.6 (H) 10/26/2019    MG 1.7 10/29/2019    AST 29 10/26/2019    ALT 28 10/26/2019    ALBUMIN 3.7 10/26/2019    PROT 9.1 (H) 10/26/2019    BILITOT 0.2 10/26/2019    WBC 6.82 10/29/2019    HGB 9.8 (L) 10/29/2019    HCT 31.9 (L) 10/29/2019    HCT 25 (L) 08/12/2019    MCV 86 10/29/2019     10/29/2019    INR 1.0 10/26/2019    TSH 2.307 05/23/2019    CHOL 134 07/09/2019    HDL 43 07/09/2019    LDLCALC 70.8 07/09/2019    TRIG 101 07/09/2019     Assessment:     1. Coronary artery disease involving coronary bypass graft of native heart without angina pectoris  nitroGLYCERIN (NITROSTAT) 0.4 MG SL tablet   2. NSTEMI (non-ST elevated myocardial infarction)     3. S/P CABG (coronary artery bypass graft)     4. S/P drug eluting coronary stent placement     5. Essential hypertension  carvedilol (COREG) 12.5 MG tablet   6. Mixed hyperlipidemia     7. CKD (chronic  kidney disease) stage 3, GFR 30-59 ml/min     8. Type 2 diabetes mellitus with stage 3 chronic kidney disease and hypertension     9. Morbid obesity with body mass index (BMI) of 40.0 or higher           Plan:   CAD, NSTEMI 7/2019 s/p 2v CABG (LIMA-LAD, SVG-OM) 9/2019, NSTEMI 10/2019 s/p PCI mLAD and RCA  S/p CABG, S/p BEA  - successful PCI of the mid LAD and prox RCA 10/2019; residual stenosis of prox OM and distal anastamosis of SVG-OM  - asymptomatic, counseled on plan for optimal medical therapy for management of residual plaque and known CAD, lack of need for further investigation or intervention unless symptoms dictate  - CP free, encouraged again regarding cardiac rehab  - continue ASA 81 for lifetime, continue plavix for at least one year from PCI  - continue high intensity statin  - continue BB  - prn NTG, notify if requiring NTG  - BP goal <130; LDL goal <70  - cardiac rehab    HTN  - intermittently above goal, systolic 150s today, goal <130/80, resting HR in 80s-90s  - increase coreg to 12.5 bid  - continue norvasc 10, valsartan 320    Morbid Obesity  - cardiac rehab  - counseled on low sodium, mediterranean diet, weight loss    Dyslipidemia.   - continue statin    F/u in 3-6 months or as symptoms dictate    Washington Cortez MD  Cardiology Fellow PGY-6  Pager: [phone]

## 2019-11-20 NOTE — PROGRESS NOTES
Digital Medicine: Clinician Follow-Up    Patient came by [facility] to meet clinican in-person and gave update on medication changes by cardiology implemented today. She was accompanied by her , . Moiz Romero. She reports doing well and is pleased with her blood pressure improvements with the most recent medication changes of switching from metoprolol to carvedilol and from losartan to valsartan for better blood pressure control by Digital Medicine. She expressed gratitude towards the efforts to modify her blood pressure regimen in order to better control her blood pressure. She met with cardiology this morning where Washington Cortez MD increased carvedilol from 6.25 mg BID to 12.5 mg BID for additional improvements. Mrs. Page does have questions regarding her CKD diagnosis which were deferred to her PCP Liz Roberts for discussion.     The history is provided by the patient and the spouse. No  was used.     Follow Up  Follow-up reason(s): reading review and medication change follow-up      Readings are trending down due to medication adherence.    Medication Change: alternative therapy    Patient started new medication.    Date:  11/7/2019  Is patient tolerating med change?:  YesMedication changes made on 11/5/19:  Stop metoprolol and losartan.  Restart Coreg and Diovan for better BP control.   Consider switching amlodipine to nifedipine to improve DBP.   Repeat BMP on 11/14/19 for electrolyte and kidney function.  Determine if chlorthalidone should be restarted based on BP response to changes above and BMP results.      INTERVENTION(S)  encouragement/support    PLAN  patient verbalizes understanding and continue monitoring    Diabetes:  Last A1c of 6.6% meets goal of <7%.  Reviewed readings - one episode of hypoglycemia noted after lunch.  No changes to diabetes regimen at this time.    Hypertension:  Current 30-day BP average of 154/94 is uncontrolled, does not  meet goal of <130/80.  Reviewed readings: trending downwards where both SBP and DBP are consistently elevated above goal. Beginning to see improvements in BP with recent medications changes.  Reviewed BMP labs repeated on 11/14/19 which showed improved GFR of 58 from 52.2 on 10/29/19.  Encouraged continued hydration of at least 64 oz daily to prevent dehydration.    Carvedilol increased to 12.5 mg BID by Washington Cortez MD today.  No additional medication changes suggested at this time.   Continue amlodipine and valsartan as prescribed.     Follow-up in 2 weeks.      There are no preventive care reminders to display for this patient.    Last 5 Patient Entered Readings                                      Current 30 Day Average: 154/94     Recent Readings 11/18/2019 11/11/2019 11/6/2019 11/6/2019 11/5/2019    SBP (mmHg) 145 152 165 161 183    DBP (mmHg) 86 99 98 96 90    Pulse 87 87 87 93 111        Last 6 Patient Entered Readings                                          Most Recent A1c: 6.6% on 10/26/2019  (Goal: 8%)     Recent Readings 11/19/2019 11/13/2019 11/11/2019 11/6/2019 11/5/2019    Blood Glucose (mg/dL) 96 64 121 74 133           Hypertension Medications             amLODIPine (NORVASC) 10 MG tablet Take 1 tablet (10 mg total) by mouth once daily.    carvedilol (COREG) 12.5 MG tablet Take 1 tablet (12.5 mg total) by mouth 2 (two) times daily with meals.    valsartan (DIOVAN) 320 MG tablet Take 1 tablet (320 mg total) by mouth once daily.    nitroGLYCERIN (NITROSTAT) 0.4 MG SL tablet Place 1 tablet (0.4 mg total) under the tongue every 5 (five) minutes as needed for Chest pain.        Diabetes Medications             glipiZIDE (GLUCOTROL) 10 MG tablet Take 2 tablets (20 mg total) by mouth daily with breakfast.    insulin detemir U-100 (LEVEMIR FLEXTOUCH) 100 unit/mL (3 mL) SubQ InPn pen Inject 15 Units into the skin every evening.          Patient asked: How did the diagnosis of CKD come about?   How bad  is the damage to my kidney function?  Is the damage reversible?  Patient's questions on CKD were deferred to her PCP for discussion. Referred to physician for clarification.    Patient asked: I have a lab scheduled for tomorrow. Is it necessary for me to complete it?  No, I did not restart chlorthalidone diuretic; therefore, this lab is no longer required. Repeated BMP on 11/14/19 for kidney function completed. Lab appointment for tomorrow at [place] was cancelled.      Patient asked:       Screenings

## 2019-11-21 ENCOUNTER — OFFICE VISIT (OUTPATIENT)
Dept: PRIMARY CARE CLINIC | Facility: CLINIC | Age: 74
End: 2019-11-21
Payer: MEDICARE

## 2019-11-21 ENCOUNTER — TELEPHONE (OUTPATIENT)
Dept: PRIMARY CARE CLINIC | Facility: CLINIC | Age: 74
End: 2019-11-21

## 2019-11-21 VITALS
DIASTOLIC BLOOD PRESSURE: 65 MMHG | HEART RATE: 77 BPM | OXYGEN SATURATION: 95 % | HEIGHT: 62 IN | TEMPERATURE: 98 F | RESPIRATION RATE: 16 BRPM | SYSTOLIC BLOOD PRESSURE: 125 MMHG | WEIGHT: 224 LBS | BODY MASS INDEX: 41.22 KG/M2

## 2019-11-21 DIAGNOSIS — Z79.4 TYPE 2 DIABETES MELLITUS WITH STAGE 3 CHRONIC KIDNEY DISEASE, WITH LONG-TERM CURRENT USE OF INSULIN: Primary | ICD-10-CM

## 2019-11-21 DIAGNOSIS — N18.30 TYPE 2 DIABETES MELLITUS WITH STAGE 3 CHRONIC KIDNEY DISEASE, WITH LONG-TERM CURRENT USE OF INSULIN: Primary | ICD-10-CM

## 2019-11-21 DIAGNOSIS — Z95.5 STATUS POST CORONARY ARTERY STENT PLACEMENT: ICD-10-CM

## 2019-11-21 DIAGNOSIS — M47.816 LUMBAR SPONDYLOSIS: ICD-10-CM

## 2019-11-21 DIAGNOSIS — I21.4 NSTEMI (NON-ST ELEVATED MYOCARDIAL INFARCTION): ICD-10-CM

## 2019-11-21 DIAGNOSIS — I11.9 HYPERTENSIVE HEART DISEASE WITHOUT HEART FAILURE: ICD-10-CM

## 2019-11-21 DIAGNOSIS — E11.22 TYPE 2 DIABETES MELLITUS WITH STAGE 3 CHRONIC KIDNEY DISEASE, WITH LONG-TERM CURRENT USE OF INSULIN: Primary | ICD-10-CM

## 2019-11-21 PROCEDURE — 1101F PR PT FALLS ASSESS DOC 0-1 FALLS W/OUT INJ PAST YR: ICD-10-PCS | Mod: CPTII,S$GLB,, | Performed by: INTERNAL MEDICINE

## 2019-11-21 PROCEDURE — 99499 RISK ADDL DX/OHS AUDIT: ICD-10-PCS | Mod: S$GLB,,, | Performed by: INTERNAL MEDICINE

## 2019-11-21 PROCEDURE — 99214 PR OFFICE/OUTPT VISIT, EST, LEVL IV, 30-39 MIN: ICD-10-PCS | Mod: S$GLB,,, | Performed by: INTERNAL MEDICINE

## 2019-11-21 PROCEDURE — 1159F PR MEDICATION LIST DOCUMENTED IN MEDICAL RECORD: ICD-10-PCS | Mod: S$GLB,,, | Performed by: INTERNAL MEDICINE

## 2019-11-21 PROCEDURE — 1126F AMNT PAIN NOTED NONE PRSNT: CPT | Mod: S$GLB,,, | Performed by: INTERNAL MEDICINE

## 2019-11-21 PROCEDURE — 99999 PR PBB SHADOW E&M-EST. PATIENT-LVL III: ICD-10-PCS | Mod: PBBFAC,,, | Performed by: INTERNAL MEDICINE

## 2019-11-21 PROCEDURE — 3074F SYST BP LT 130 MM HG: CPT | Mod: CPTII,S$GLB,, | Performed by: INTERNAL MEDICINE

## 2019-11-21 PROCEDURE — 1126F PR PAIN SEVERITY QUANTIFIED, NO PAIN PRESENT: ICD-10-PCS | Mod: S$GLB,,, | Performed by: INTERNAL MEDICINE

## 2019-11-21 PROCEDURE — 99499 UNLISTED E&M SERVICE: CPT | Mod: S$GLB,,, | Performed by: INTERNAL MEDICINE

## 2019-11-21 PROCEDURE — 1159F MED LIST DOCD IN RCRD: CPT | Mod: S$GLB,,, | Performed by: INTERNAL MEDICINE

## 2019-11-21 PROCEDURE — 99214 OFFICE O/P EST MOD 30 MIN: CPT | Mod: S$GLB,,, | Performed by: INTERNAL MEDICINE

## 2019-11-21 PROCEDURE — 3044F PR MOST RECENT HEMOGLOBIN A1C LEVEL <7.0%: ICD-10-PCS | Mod: CPTII,S$GLB,, | Performed by: INTERNAL MEDICINE

## 2019-11-21 PROCEDURE — 3078F DIAST BP <80 MM HG: CPT | Mod: CPTII,S$GLB,, | Performed by: INTERNAL MEDICINE

## 2019-11-21 PROCEDURE — 3074F PR MOST RECENT SYSTOLIC BLOOD PRESSURE < 130 MM HG: ICD-10-PCS | Mod: CPTII,S$GLB,, | Performed by: INTERNAL MEDICINE

## 2019-11-21 PROCEDURE — 3044F HG A1C LEVEL LT 7.0%: CPT | Mod: CPTII,S$GLB,, | Performed by: INTERNAL MEDICINE

## 2019-11-21 PROCEDURE — 99999 PR PBB SHADOW E&M-EST. PATIENT-LVL III: CPT | Mod: PBBFAC,,, | Performed by: INTERNAL MEDICINE

## 2019-11-21 PROCEDURE — 1101F PT FALLS ASSESS-DOCD LE1/YR: CPT | Mod: CPTII,S$GLB,, | Performed by: INTERNAL MEDICINE

## 2019-11-21 PROCEDURE — 3078F PR MOST RECENT DIASTOLIC BLOOD PRESSURE < 80 MM HG: ICD-10-PCS | Mod: CPTII,S$GLB,, | Performed by: INTERNAL MEDICINE

## 2019-11-21 RX ORDER — TIZANIDINE 4 MG/1
4 TABLET ORAL 2 TIMES DAILY PRN
Qty: 30 TABLET | Refills: 1 | Status: SHIPPED | OUTPATIENT
Start: 2019-11-21 | End: 2020-05-21

## 2019-11-21 RX ORDER — GLIPIZIDE 10 MG/1
10 TABLET ORAL
Qty: 30 TABLET | Refills: 1 | Status: SHIPPED | OUTPATIENT
Start: 2019-11-21 | End: 2020-02-03 | Stop reason: SDUPTHER

## 2019-11-21 NOTE — TELEPHONE ENCOUNTER
----- Message from Asha Fernandes sent at 11/21/2019  3:05 PM CST -----  Contact: Patient [phone]  Prescription Request:     Name of medication: clopidogrel (PLAVIX) 75 mg tablet    Reason for request: Refill    Pharmacy: [id number] [address] [facility] AT [facility]    Please advise.    Thank You

## 2019-11-29 DIAGNOSIS — M51.36 DDD (DEGENERATIVE DISC DISEASE), LUMBAR: Primary | ICD-10-CM

## 2019-12-02 ENCOUNTER — TELEPHONE (OUTPATIENT)
Dept: PRIMARY CARE CLINIC | Facility: CLINIC | Age: 74
End: 2019-12-02

## 2019-12-02 DIAGNOSIS — E11.9 DIABETES MELLITUS WITHOUT COMPLICATION: ICD-10-CM

## 2019-12-02 DIAGNOSIS — Z95.1 POSTSURGICAL AORTOCORONARY BYPASS STATUS: Primary | ICD-10-CM

## 2019-12-02 DIAGNOSIS — I25.10 CORONARY ARTERY DISEASE INVOLVING NATIVE CORONARY ARTERY OF NATIVE HEART WITHOUT ANGINA PECTORIS: ICD-10-CM

## 2019-12-02 NOTE — TELEPHONE ENCOUNTER
----- Message from Dara Brumfield sent at 12/2/2019 10:47 AM CST -----  Contact: [phone]  Type: Rx    Name of medication(s): glipiZIDE (GLUCOTROL) 10 MG tablet,     Is this a refill? New rx? refill    Who prescribed medication? Armando    Pharmacy Name, Phone, & Location:[id number] [address] AT [facility]     Comments:   Please advise,  thank you      Type: Rx    Name of medication(s): Free style barb patch    Is this a refill? New rx?  NEW    Who prescribed medication?  Dr. Roberts    [id number] [address] AT [facility]     Comments:  Please advise, thank you.

## 2019-12-11 ENCOUNTER — HOSPITAL ENCOUNTER (OUTPATIENT)
Dept: CARDIOLOGY | Facility: CLINIC | Age: 74
Discharge: HOME | End: 2019-12-11
Attending: THORACIC SURGERY (CARDIOTHORACIC VASCULAR SURGERY)
Payer: MEDICARE

## 2019-12-11 DIAGNOSIS — Z95.1 POSTSURGICAL AORTOCORONARY BYPASS STATUS: ICD-10-CM

## 2019-12-11 DIAGNOSIS — I25.10 CORONARY ARTERY DISEASE INVOLVING NATIVE CORONARY ARTERY OF NATIVE HEART WITHOUT ANGINA PECTORIS: ICD-10-CM

## 2019-12-13 ENCOUNTER — TELEPHONE (OUTPATIENT)
Dept: ORTHOPEDICS | Facility: CLINIC | Age: 74
End: 2019-12-13

## 2019-12-13 NOTE — TELEPHONE ENCOUNTER
----- Message from Isabella Holloway sent at 12/13/2019 10:09 AM CST -----  Contact: Pt   Patient is requesting a callback regarding urgent appointment right knee pain . Please give the patient a callback at [phone].

## 2019-12-17 ENCOUNTER — HOSPITAL ENCOUNTER (OUTPATIENT)
Dept: CARDIOLOGY | Facility: CLINIC | Age: 74
Discharge: HOME | End: 2019-12-17
Attending: THORACIC SURGERY (CARDIOTHORACIC VASCULAR SURGERY)
Payer: MEDICARE

## 2019-12-17 VITALS
HEIGHT: 62 IN | WEIGHT: 224 LBS | DIASTOLIC BLOOD PRESSURE: 61 MMHG | SYSTOLIC BLOOD PRESSURE: 142 MMHG | BODY MASS INDEX: 41.22 KG/M2 | HEART RATE: 88 BPM

## 2019-12-17 LAB
CV STRESS BASE HR: 88 BPM
DIASTOLIC BLOOD PRESSURE: 61 MMHG
OHS CV CPX 1 MINUTE RECOVERY HEART RATE: 116 BPM
OHS CV CPX 85 PERCENT MAX PREDICTED HEART RATE MALE: 121
OHS CV CPX DATA GRADE - AT: 1.8
OHS CV CPX DATA GRADE - PEAK: 3
OHS CV CPX DATA O2 SAT - PEAK: 97
OHS CV CPX DATA O2 SAT - REST: 98
OHS CV CPX DATA SPEED - AT: 1.5
OHS CV CPX DATA SPEED - PEAK: 1.9
OHS CV CPX DATA TIME - AT: 2.58
OHS CV CPX DATA TIME - PEAK: 4.73
OHS CV CPX DATA VE/VCO2 - AT: 44
OHS CV CPX DATA VE/VCO2 - PEAK: 49
OHS CV CPX DATA VE/VO2 - AT: 42
OHS CV CPX DATA VE/VO2 - PEAK: 53
OHS CV CPX DATA VO2 - AT: 8.9
OHS CV CPX DATA VO2 - PEAK: 10.2
OHS CV CPX DATA VO2 - REST: 3.3
OHS CV CPX ESTIMATED METS: 3
OHS CV CPX FEV1/FVC: 0.84
OHS CV CPX FORCED EXPIRATORY VOLUME: 1.93
OHS CV CPX FORCED VITAL CAPACITY (FVC): 2.3
OHS CV CPX HIGHEST VO: 14.4
OHS CV CPX MAX PREDICTED HEART RATE: 143
OHS CV CPX MAXIMAL VOLUNTARY VENTILATION (MVV) PREDICTED: 77.2
OHS CV CPX MAXIMAL VOLUNTARY VENTILATION (MVV): 51
OHS CV CPX MAXIUMUM EXERCISE VENTILATION (VE MAX): 51.4
OHS CV CPX PATIENT AGE: 72
OHS CV CPX PATIENT HEIGHT IN: 62
OHS CV CPX PATIENT IS FEMALE AGE 11-19: 0
OHS CV CPX PATIENT IS FEMALE AGE GREATER THAN 19: 1
OHS CV CPX PATIENT IS FEMALE AGE LESS THAN 11: 0
OHS CV CPX PATIENT IS FEMALE: 1
OHS CV CPX PATIENT IS MALE AGE 11-25: 0
OHS CV CPX PATIENT IS MALE AGE GREATER THAN 25: 0
OHS CV CPX PATIENT IS MALE AGE LESS THAN 11: 0
OHS CV CPX PATIENT IS MALE GREATER THAN 18: 0
OHS CV CPX PATIENT IS MALE LESS THAN OR EQUAL TO 18: 0
OHS CV CPX PATIENT IS MALE: 0
OHS CV CPX PATIENT WEIGHT RETURNED IN OZ: 3584
OHS CV CPX PEAK DIASTOLIC BLOOD PRESSURE: 91 MMHG
OHS CV CPX PEAK HEAR RATE: 129 BPM
OHS CV CPX PEAK RATE PRESSURE PRODUCT: NORMAL
OHS CV CPX PEAK SYSTOLIC BLOOD PRESSURE: 186 MMHG
OHS CV CPX PERCENT BODY FAT: 36.2
OHS CV CPX PERCENT MAX PREDICTED HEART RATE ACHIEVED: 90
OHS CV CPX PREDICTED VO2: 14.4 ML/KG/MIN
OHS CV CPX RATE PRESSURE PRODUCT PRESENTING: NORMAL
OHS CV CPX REST PET CO2: 27
OHS CV CPX VE/VCO2 SLOPE: 42
STRESS ECHO POST EXERCISE DUR MIN: 4 MINUTES
STRESS ECHO POST EXERCISE DUR SEC: 44 SECONDS
SYSTOLIC BLOOD PRESSURE: 142 MMHG

## 2019-12-17 PROCEDURE — 94621 CARDIOPULM EXERCISE TESTING: CPT | Mod: S$GLB,,, | Performed by: INTERNAL MEDICINE

## 2019-12-17 PROCEDURE — 94621 CARDIOPULMONARY EXERCISE TESTING (CUPID ONLY): ICD-10-PCS | Mod: S$GLB,,, | Performed by: INTERNAL MEDICINE

## 2019-12-19 NOTE — PROGRESS NOTES
HISTORY: S/P CABG (8-12-19), CAD, HTN, HLP, HX OF NSTEMI, DM, CKD III, HX OF TIA, EF=73%(10-27-19)    ANTHROPOMETRICS:     PRE   Abdominal girth (in) 48   Height (in) 62   Weight (lbs) 224   BMI 41.0   % Body Fat 36.2%       EXERCISE RESULTS:     PRE   Peak VO2 (CPX only) 10.2   Actual METS (CPX only) 2.9   Estimated METS 3.0       LAB RESULTS:    Lab Results   Component Value Date    MPV 10.1 12/11/2019       Lab Results   Component Value Date    CHOL 103 (L) 12/11/2019     Lab Results   Component Value Date    HDL 39 (L) 12/11/2019     Lab Results   Component Value Date    LDLCALC 52.0 (L) 12/11/2019     Lab Results   Component Value Date    TRIG 60 12/11/2019     Lab Results   Component Value Date    CHOLHDL 37.9 12/11/2019       Lab Results   Component Value Date    GLUF 121 (H) 12/11/2019     Lab Results   Component Value Date    HGBA1C 6.7 (H) 12/11/2019        Lab Results   Component Value Date    HSCRP 5.00 (H) 12/11/2019         LOLA SCORES:     PRE   Anxiety 0   Depression 2   Somatic 1   Hostility 0     SF-36 SCORES:     PRE   Physical Function 16   Social Function 2   Mental Health 27   Pain 7   Change in Health 2   Physical Role Limitation 0   Mental Role Limitation 3   Energy/Fatigue 5   Health Perceptions 18   Total Score 80     PHQ-9:     PRE   PHQ-9 2       EDUCATION SCORES:     PRE   Education Score 40

## 2019-12-23 DIAGNOSIS — D64.9 NORMOCYTIC ANEMIA: ICD-10-CM

## 2019-12-23 RX ORDER — FERROUS SULFATE 325(65) MG
325 TABLET ORAL DAILY
Qty: 90 TABLET | Refills: 0 | Status: SHIPPED | OUTPATIENT
Start: 2019-12-23 | End: 2020-03-25

## 2019-12-26 ENCOUNTER — PATIENT OUTREACH (OUTPATIENT)
Dept: ADMINISTRATIVE | Facility: HOSPITAL | Age: 74
End: 2019-12-26

## 2019-12-26 NOTE — PROGRESS NOTES
Pre-visit chart review completed.  Immunizations reviewed and updated.    Patient due for the following    Shingles Vaccine (2 of 3)    Pneumococcal Vaccine (65+ High/Highest Risk) (2 of 2 - PPSV23)    Mammogram

## 2019-12-30 ENCOUNTER — TELEPHONE (OUTPATIENT)
Dept: CARDIAC REHAB | Facility: CLINIC | Age: 74
End: 2019-12-30

## 2019-12-31 ENCOUNTER — TELEPHONE (OUTPATIENT)
Dept: PRIMARY CARE CLINIC | Facility: CLINIC | Age: 74
End: 2019-12-31

## 2019-12-31 ENCOUNTER — CLINICAL SUPPORT (OUTPATIENT)
Dept: CARDIAC REHAB | Facility: CLINIC | Age: 74
End: 2019-12-31
Payer: MEDICARE

## 2019-12-31 VITALS
SYSTOLIC BLOOD PRESSURE: 150 MMHG | DIASTOLIC BLOOD PRESSURE: 96 MMHG | OXYGEN SATURATION: 100 % | HEART RATE: 78 BPM | RESPIRATION RATE: 16 BRPM

## 2019-12-31 DIAGNOSIS — E11.22 TYPE 2 DIABETES MELLITUS WITH STAGE 3 CHRONIC KIDNEY DISEASE, WITH LONG-TERM CURRENT USE OF INSULIN: Primary | ICD-10-CM

## 2019-12-31 DIAGNOSIS — I25.10 ATHEROSCLEROSIS OF NATIVE CORONARY ARTERY OF NATIVE HEART WITHOUT ANGINA PECTORIS: ICD-10-CM

## 2019-12-31 DIAGNOSIS — N18.30 TYPE 2 DIABETES MELLITUS WITH STAGE 3 CHRONIC KIDNEY DISEASE, WITH LONG-TERM CURRENT USE OF INSULIN: Primary | ICD-10-CM

## 2019-12-31 DIAGNOSIS — I25.10 CORONARY ARTERY DISEASE INVOLVING NATIVE CORONARY ARTERY OF NATIVE HEART WITHOUT ANGINA PECTORIS: ICD-10-CM

## 2019-12-31 DIAGNOSIS — R53.81 PHYSICAL DECONDITIONING: ICD-10-CM

## 2019-12-31 DIAGNOSIS — Z79.4 TYPE 2 DIABETES MELLITUS WITH STAGE 3 CHRONIC KIDNEY DISEASE, WITH LONG-TERM CURRENT USE OF INSULIN: Primary | ICD-10-CM

## 2019-12-31 DIAGNOSIS — Z95.1 S/P CABG (CORONARY ARTERY BYPASS GRAFT): ICD-10-CM

## 2019-12-31 DIAGNOSIS — R26.81 GAIT INSTABILITY: ICD-10-CM

## 2019-12-31 PROCEDURE — 93798 PR CARDIAC REHAB/MONITOR: ICD-10-PCS | Mod: S$GLB,,, | Performed by: INTERNAL MEDICINE

## 2019-12-31 PROCEDURE — 99999 PR PBB SHADOW E&M-EST. PATIENT-LVL IV: ICD-10-PCS | Mod: PBBFAC,,,

## 2019-12-31 PROCEDURE — 99999 PR PBB SHADOW E&M-EST. PATIENT-LVL IV: CPT | Mod: PBBFAC,,,

## 2019-12-31 PROCEDURE — 93798 PHYS/QHP OP CAR RHAB W/ECG: CPT | Mod: S$GLB,,, | Performed by: INTERNAL MEDICINE

## 2019-12-31 NOTE — PROGRESS NOTES
Patient here for Phase II Cardiac Rehab orientation.     BP (!) 150/96 (BP Location: Left arm, Patient Position: Standing, BP Method: Large (Manual))   Pulse 78   Resp 16   LMP 01/09/2001 (Approximate)   SpO2 100%     ASSESSMENT:  Heart Sounds: regular rate and rhythm  Prosthetic Valve: No  Lung Sounds: clear to auscultation bilaterally  Capillary Refill: normal  Left Radial Pulse: Weak (1+)  Right Radial Pulse: Normal (+2)  Left Pedal Pulse: Normal (+2)  Right Pedal Pulse: Normal (+2)  Right Edema: none  Left Edema none  Strength: limited by pain in shoulders, knees, hips and hands  Range of Motion: diminished range of motion  Existing Limitations:    Site   [x] Arthritis, bursitis Hips, knees   [] Amputation, atrophy    [] Other:    []     Diabetic patient's foot examination comments: Normal -  Bilateral  Incisional site: healing well  Special needs: N/A    QOL:  Discussed Stephie, SF36, and PHQ-9 Questionnaire scores with patient.  Patient denies any overwhelming stress or anxiety.  Patient has been instructed to notify staff in the event that circumstances worsen.  Patient verbalizes understanding.    RISK FACTORS:  The following risk factors are present:  diabetes, hyperlipidemia, hypertension, obesity, sedentary lifestyle, exercise    GOALS:  Patient has set the following goals:  Decrease cholesterol level  Increase exercise tolerance  Increase knowledge of CAD  Decrease blood pressure  Weight loss  Demonstrate accurate pulse taking  ID & manage personal areas of stress  Stop smoking  Control diabetes by adjusting diet and exercise  Learn more about healthy eating    Discussed Cardiac Rehab program in depth with patient.  Medication list updated per patient & marked as reviewed.  Patient has been instructed to notify staff of any problems while attending rehab (ie: chest pain, shortness of breath, lightheadedness, dizziness).  Patient has been instructed to monitor blood pressure readings outside of rehab  & to keep a daily log of the readings.  Patient verbalizes understanding.    Francisco Sanches, RN  Cardiac Rehab Nurse

## 2019-12-31 NOTE — PROGRESS NOTES
Exercise:  I met with the patient for orientation.  Consent forms were signed, entry CPX test results were reviewed, proper attire and shoes were discussed, and strength assessment was performed.         The entry CPX test results included weight (224 lb), abdominal girth (48 in), BMI (41), and body composition (36.2%).  An estimated MET Level of 3.0 and a Peak VO2 of 10.2ml/kg/min (2.9 actual METS) were measured.  This places the patient in the high risk category.  Upon exit CPX an estimated MET Level of 3.9 will be desired to achieve the goal of a 30% improvement.     Based on entry CPX test, the target heart rate range for patient is 110 to 123 bpm.      The patient will attend cardiac rehab class 3 times per week which will include both aerobic and resistance training which will be modified to fit limitations.  Aerobic exercise will be 30-60 minutes with a goal intensity of 12-15 on the RPE scale.  Resistance training will incorporate free weights 1-2 sets, 10-15 repetitions with a beginning weight of 0 to 3 pounds based on strength assessment.  Due to deconditioning and instability with standing and walking, the patient's program will initially concentrate on aerobic exercise.      We discussed the possibility of going to Physical Therapy prior to starting Phase II cardiac rehab to help with her deconditioning and balance.  The patient stated she would like to try PT first.  A message was sent to Dr. Roberts with a request for a physical therapy order.    There were some limitations noted by patient as noted above.  The patient stated she is currently not exercising.  She was encouraged to try to begin exercising aerobically and to add at least two additional days of aerobic exercise per week once she begins cardiac rehab class for a minimum of 30 minutes.  The patient stated understanding.      The patient will not begin Cardiac Rehab until after she attends physical therapy.    Once Mrs. Romero begins cardiac  rehab, the exercise prescription will be adjusted based on tolerance of exercise intensity by patient.    Taina Junior., CEP

## 2019-12-31 NOTE — TELEPHONE ENCOUNTER
----- Message from Marion Charles CEP sent at 12/31/2019 11:54 AM CST -----  Good morning Dr. Roberts.  We had an orientation for Phase II cardiac rehab this morning with Ms. Page.  Since she is very deconditioned and is experiencing weakness and imbalances, we discussed the possibility of her attending physical therapy prior the cardiac rehab.  She stated she would like to try it to help with her issues.  She also likes the idea of one on one care she will get in PT.    Can you order physical therapy for Ms. Page?  If so, can you call the patient and let her know what you decided?  Thank you for your help.  Shan Junior, MICHELE  Exercise Physiologist

## 2019-12-31 NOTE — TELEPHONE ENCOUNTER
Internal order issued for Physical Therapy to address deconditioning and gait training. Please inform patient.

## 2020-01-10 ENCOUNTER — TELEPHONE (OUTPATIENT)
Dept: PRIMARY CARE CLINIC | Facility: CLINIC | Age: 75
End: 2020-01-10

## 2020-01-10 DIAGNOSIS — Z12.31 SCREENING MAMMOGRAM, ENCOUNTER FOR: Primary | ICD-10-CM

## 2020-01-10 NOTE — PROGRESS NOTES
Digital Medicine: Health  Follow-Up    Patient reports she has been resting during the holidays.  Reports she will get back to taking more readings.  States she is starting PT next week.    The history is provided by the patient.     Follow Up  Follow-up reason(s): reading review      Readings are missing.   patient reminder needed.      INTERVENTION(S)  recommend physical activity, encouragement/support and denied questions    PLAN  patient verbalizes understanding      There are no preventive care reminders to display for this patient.    Last 5 Patient Entered Readings                                      Current 30 Day Average: 143/78     Recent Readings 12/30/2019 12/23/2019 12/18/2019 12/16/2019 12/15/2019    SBP (mmHg) 147 124 149 153 140    DBP (mmHg) 77 75 94 85 58    Pulse 82 87 90 83 83        Last 6 Patient Entered Readings                                          Most Recent A1c: 6.7% on 12/11/2019  (Goal: 8%)     Recent Readings 1/8/2020 12/30/2019 12/26/2019 12/4/2019 12/4/2019    Blood Glucose (mg/dL) 149 128 91 145 171                Screenings    SDOH

## 2020-01-14 ENCOUNTER — CLINICAL SUPPORT (OUTPATIENT)
Dept: REHABILITATION | Facility: HOSPITAL | Age: 75
End: 2020-01-14
Attending: INTERNAL MEDICINE
Payer: MEDICARE

## 2020-01-14 DIAGNOSIS — R53.81 PHYSICAL DECONDITIONING: ICD-10-CM

## 2020-01-14 DIAGNOSIS — N18.30 TYPE 2 DIABETES MELLITUS WITH STAGE 3 CHRONIC KIDNEY DISEASE, WITH LONG-TERM CURRENT USE OF INSULIN: ICD-10-CM

## 2020-01-14 DIAGNOSIS — R26.81 GAIT INSTABILITY: ICD-10-CM

## 2020-01-14 DIAGNOSIS — E11.22 TYPE 2 DIABETES MELLITUS WITH STAGE 3 CHRONIC KIDNEY DISEASE, WITH LONG-TERM CURRENT USE OF INSULIN: ICD-10-CM

## 2020-01-14 DIAGNOSIS — I25.10 CORONARY ARTERY DISEASE INVOLVING NATIVE CORONARY ARTERY OF NATIVE HEART WITHOUT ANGINA PECTORIS: ICD-10-CM

## 2020-01-14 DIAGNOSIS — Z79.4 TYPE 2 DIABETES MELLITUS WITH STAGE 3 CHRONIC KIDNEY DISEASE, WITH LONG-TERM CURRENT USE OF INSULIN: ICD-10-CM

## 2020-01-14 PROCEDURE — 97530 THERAPEUTIC ACTIVITIES: CPT | Mod: PO

## 2020-01-14 PROCEDURE — 97161 PT EVAL LOW COMPLEX 20 MIN: CPT | Mod: PO

## 2020-01-14 NOTE — PROGRESS NOTES
[facility]  Physical Therapy Initial Evaluation    Name: Kaylee Urena [facility] Number: [id number]    Therapy Diagnosis: No diagnosis found.  Physician: Liz Roberts, *    Physician Orders: PT Eval and Treat   Medical Diagnosis from Referral:   E11.22,N18.3,Z79.4 (ICD-10-CM) - Type 2 diabetes mellitus with stage 3 chronic kidney disease, with long-term current use of insulin   I25.10 (ICD-10-CM) - Coronary artery disease involving native coronary artery of native heart without angina pectoris   R53.81 (ICD-10-CM) - Physical deconditioning   R26.81 (ICD-10-CM) - Gait instability       Evaluation Date: 1/14/2020  Authorization Period Expiration: 12/30/2020  Plan of Care Expiration: 3/12/2020  Visit # / Visits authorized: 1/ 1    Time In: 2:00  Time Out: 3:00  Total Billable Time: 60 minutes    Precautions: Standard, Diabetes and CAD    Subjective   Date of onset: July of 2019 IM x 2 followed by a third MI in October.     History of current condition - Kaylee reports: Pt has been deconditioned since her 2 IM's in July of 2019 and fever following her CABG in August. She also had a third IM in October followed by two stents.  Pt reported that her Dr would like her to start Cardiac rehab, but she needs to have a more steady gait and improved strength and endurance before she can enter the program.  Pt also reports a hx of a L shoulder injury in 9/2018 secondary to a fall as well as R knee pain.     Medical History:   Past Medical History:   Diagnosis Date    Acid reflux     Age-related osteoporosis without current pathological fracture 1/11/2019    Cataract     CKD (chronic kidney disease) stage 3, GFR 30-59 ml/min     Dry mouth     History of TIA (transient ischemic attack) 12/11/2018    Hyperlipidemia 12/2/2014    Hypertensive heart disease with diastolic heart failure 11/28/2012    Morbid obesity with body mass index (BMI) of 40.0 or higher 5/9/2016    KAMRAN (obstructive  sleep apnea)     KAMRAN on CPAP 2016    Osteoporosis without current pathological fracture 2018    Physical deconditioning 2018    Status post total left knee replacement 2017    Type 2 diabetes mellitus with stage 3 chronic kidney disease, without long-term current use of insulin 2019       Surgical History:   Kaylee Romero  has a past surgical history that includes  section; ankle surgery (l); Appendectomy; Knee arthroscopy w/ debridement; Dilation and curettage of uterus; Knee surgery (Left, 2017); Left heart catheterization (Left, 2019); Coronary Artery Bypass Graft (CABG) (N/A, 2019); Endoscopic harvest of vein (Left, 2019); and Coronary artery bypass graft (2019).    Medications:   Kaylee has a current medication list which includes the following prescription(s): alcohol swabs, alendronate, amlodipine, aspirin, atorvastatin, blood sugar diagnostic, carvedilol, clopidogrel, diclofenac sodium, dulaglutide, ferrous sulfate, flash glucose scanning reader, flash glucose sensor, glipizide, insulin detemir u-100, lancets, multivitamin, nitroglycerin, pen needle, diabetic, tizanidine, and valsartan.    Allergies:   Review of patient's allergies indicates:   Allergen Reactions    Bactrim [sulfamethoxazole-trimethoprim] Other (See Comments)     Generic version  Sulfa makes her sick    Keflex [cephalexin] Other (See Comments)     Turns orange        Imaging, X-Ray: lumbar spine and B shoulders  Prior Therapy: yes eval of shoulder  Social History: Pt lives in a single story home with 4 steps to enter with a banister.  Pt  lives with their spouse  Occupation: retired  Prior Level of Function: Pt was independent with ADL's and traveling.  Current Level of Function: PT is not able to walk very very far - can't walk a block.      Pain:  Current 2/10, worst 10/10, best 0/10   Location: right knee   Description: Aching  Aggravating Factors: not  sure  Easing Factors: OTC pain medications and rest.    Pts goals: Pt would like to walk more confidently and get over the R knee and L shoulder discomfort.  She would also like to get to the point that she can begin Cardiac rehabilitation.    Objective   Observation: Pt was able to enter the clinic ambulating with supervision without an assistive device.    Posture:  WFL    Lower Extremity Strength  Right LE  Left LE    Knee extension: 4/5 Knee extension: 4/5   Knee flexion: 3/5 Knee flexion: 3/5   Hip flexion: 3/5 Hip flexion: 3/5   Hip extension:  nt Hip extension: nt   Hip abduction: nt Hip abduction: nt   Hip adduction: nt Hip adduction nt   Ankle dorsiflexion: nt Ankle dorsiflexion: nt   Ankle plantarflexion: nt Ankle plantarflexion: nt     Sensation: Pt reports decreased sensation in her L thumb     Shoulder Range of Motion:   Shoulder Left Right   Flexion 90 150   Abduction nt nt   ER nt nt   IR nt nt       Upper Extremity Strength  (R) UE  (L) UE    Shoulder flexion: 4/5 Shoulder flexion: nt   Shoulder Abduction: 4/5 Shoulder abduction: nt   Shoulder ER 3+/5 Shoulder ER 3/5p   Shoulder IR 4+/5 Shoulder IR 4/5p   Elbow flexion: 4+/5 Elbow flexion: 4/5   Elbow extension: 4+/5 Elbow extension: 4/5   Wrist flexion: 4/5 Wrist flexion: 4/5   Wrist extension: 4/5 Wrist extension: 4/5    4/5 : 4/5   Lower Trap nt Lower Trap nt   Middle Trap nt Middle Trap nt   Rhomboids nt Rhomboids nt       TREATMENT   Treatment Time In: 2:40  Treatment Time Out: 2:55  Total Treatment time separate from Evaluation: 15 minutes    Kaylee received therapeutic exercises to develop strength and endurance for 15 minutes including:  Seated hip flexion 10 x 2  Seated knee extension x 10  Seated heel raises x 10  Standing rows 10 x 2    Home Exercises and Patient Education Provided    Education provided:   - yes    Written Home Exercises Provided: yes.  Exercises were reviewed and Kaylee was able to demonstrate them prior to the end  of the session.  Kaylee demonstrated good  understanding of the education provided.     See EMR under Patient Instructions for exercises provided 1/14/2020.    Assessment   Kaylee is a 74 y.o. female referred to outpatient Physical Therapy with a medical diagnosis of   E11.22,N18.3,Z79.4 (ICD-10-CM) - Type 2 diabetes mellitus with stage 3 chronic kidney disease, with long-term current use of insulin   I25.10 (ICD-10-CM) - Coronary artery disease involving native coronary artery of native heart without angina pectoris   R53.81 (ICD-10-CM) - Physical deconditioning   R26.81 (ICD-10-CM) - Gait instability   . Pt presents with reports that she needs to begin Cardiac Rehabilitation but is not physically able to secondary to unsteady gait and generalized weakness.  She has limited L shoulder ROM secondary to a fall and R knee pain.    Pt prognosis is Good.   Pt will benefit from skilled outpatient Physical Therapy to address the deficits stated above and in the chart below, provide pt/family education, and to maximize pt's level of independence.     Plan of care discussed with patient: Yes  Pt's spiritual, cultural and educational needs considered and patient is agreeable to the plan of care and goals as stated below:     Anticipated Barriers for therapy: none    Medical Necessity is demonstrated by the following  History  Co-morbidities and personal factors that may impact the plan of care Co-morbidities:   advanced age, CAD, diabetes and Gait instability adn Physical Deconditioning    Personal Factors:   age     moderate   Examination  Body Structures and Functions, activity limitations and participation restrictions that may impact the plan of care Body Regions:   lower extremities  upper extremities  trunk    Body Systems:    ROM  strength  balance  gait    Participation Restrictions:   Strenuous activity    Activity limitations:   Learning and applying knowledge  no deficits    General Tasks and Commands  no  deficits    Communication  no deficits    Mobility  lifting and carrying objects    Self care  no deficits    Domestic Life  doing house work (cleaning house, washing dishes, laundry)    Interactions/Relationships  no deficits    Life Areas  no deficits    Community and Social Life  no deficits         low   Clinical Presentation stable and uncomplicated low   Decision Making/ Complexity Score: low     Goals:  Short Term Goals: 2 weeks   Pt will be independent in a HEP to address their functional deficits related to entrance into the Cardiac Rehabilitation program    Long Term Goals: 4 weeks   Improve B LE MMT by 1/2 grade to show improvement in strength   Pt able to tolerate walking 300 ft over a level surface without her cane  Pt to have met criteria to enter a Cardiac Rehabilitation program    Plan   Plan of care Certification: 1/14/2020 to 3/12/2020.    Outpatient Physical Therapy 1-2 times weekly for 6 visits to include the following interventions: Gait Training, Manual Therapy, Moist Heat/ Ice, Neuromuscular Re-ed, Patient Education, Self Care, Therapeutic Activites and Therapeutic Exercise.     Patrick Covarrubias, PT

## 2020-01-15 NOTE — PLAN OF CARE
[facility]  Physical Therapy Initial Evaluation    Name: Kaylee Urena [facility] Number: [id number]    Therapy Diagnosis: No diagnosis found.  Physician: Liz Roberts, *    Physician Orders: PT Eval and Treat   Medical Diagnosis from Referral:   E11.22,N18.3,Z79.4 (ICD-10-CM) - Type 2 diabetes mellitus with stage 3 chronic kidney disease, with long-term current use of insulin   I25.10 (ICD-10-CM) - Coronary artery disease involving native coronary artery of native heart without angina pectoris   R53.81 (ICD-10-CM) - Physical deconditioning   R26.81 (ICD-10-CM) - Gait instability       Evaluation Date: 1/14/2020  Authorization Period Expiration: 12/30/2020  Plan of Care Expiration: 3/12/2020  Visit # / Visits authorized: 1/ 1    Time In: 2:00  Time Out: 3:00  Total Billable Time: 60 minutes    Precautions: Standard, Diabetes and CAD    Subjective   Date of onset: July of 2019 IM x 2 followed by a third MI in October.     History of current condition - Kaylee reports: Pt has been deconditioned since her 2 IM's in July of 2019 and fever following her CABG in August. She also had a third IM in October followed by two stents.  Pt reported that her Dr would like her to start Cardiac rehab, but she needs to have a more steady gait and improved strength and endurance before she can enter the program.  Pt also reports a hx of a L shoulder injury in 9/2018 secondary to a fall as well as R knee pain.     Medical History:   Past Medical History:   Diagnosis Date    Acid reflux     Age-related osteoporosis without current pathological fracture 1/11/2019    Cataract     CKD (chronic kidney disease) stage 3, GFR 30-59 ml/min     Dry mouth     History of TIA (transient ischemic attack) 12/11/2018    Hyperlipidemia 12/2/2014    Hypertensive heart disease with diastolic heart failure 11/28/2012    Morbid obesity with body mass index (BMI) of 40.0 or higher 5/9/2016    KAMRAN (obstructive  sleep apnea)     KAMRAN on CPAP 2016    Osteoporosis without current pathological fracture 2018    Physical deconditioning 2018    Status post total left knee replacement 2017    Type 2 diabetes mellitus with stage 3 chronic kidney disease, without long-term current use of insulin 2019       Surgical History:   Kaylee Romero  has a past surgical history that includes  section; ankle surgery (l); Appendectomy; Knee arthroscopy w/ debridement; Dilation and curettage of uterus; Knee surgery (Left, 2017); Left heart catheterization (Left, 2019); Coronary Artery Bypass Graft (CABG) (N/A, 2019); Endoscopic harvest of vein (Left, 2019); and Coronary artery bypass graft (2019).    Medications:   Kaylee has a current medication list which includes the following prescription(s): alcohol swabs, alendronate, amlodipine, aspirin, atorvastatin, blood sugar diagnostic, carvedilol, clopidogrel, diclofenac sodium, dulaglutide, ferrous sulfate, flash glucose scanning reader, flash glucose sensor, glipizide, insulin detemir u-100, lancets, multivitamin, nitroglycerin, pen needle, diabetic, tizanidine, and valsartan.    Allergies:   Review of patient's allergies indicates:   Allergen Reactions    Bactrim [sulfamethoxazole-trimethoprim] Other (See Comments)     Generic version  Sulfa makes her sick    Keflex [cephalexin] Other (See Comments)     Turns orange        Imaging, X-Ray: lumbar spine and B shoulders  Prior Therapy: yes eval of shoulder  Social History: Pt lives in a single story home with 4 steps to enter with a banister.  Pt  lives with their spouse  Occupation: retired  Prior Level of Function: Pt was independent with ADL's and traveling.  Current Level of Function: PT is not able to walk very very far - can't walk a block.      Pain:  Current 2/10, worst 10/10, best 0/10   Location: right knee   Description: Aching  Aggravating Factors: not  sure  Easing Factors: OTC pain medications and rest.    Pts goals: Pt would like to walk more confidently and get over the R knee and L shoulder discomfort.  She would also like to get to the point that she can begin Cardiac rehabilitation.    Objective   Observation: Pt was able to enter the clinic ambulating with supervision without an assistive device.    Posture:  WFL    Lower Extremity Strength  Right LE  Left LE    Knee extension: 4/5 Knee extension: 4/5   Knee flexion: 3/5 Knee flexion: 3/5   Hip flexion: 3/5 Hip flexion: 3/5   Hip extension:  nt Hip extension: nt   Hip abduction: nt Hip abduction: nt   Hip adduction: nt Hip adduction nt   Ankle dorsiflexion: nt Ankle dorsiflexion: nt   Ankle plantarflexion: nt Ankle plantarflexion: nt     Sensation: Pt reports decreased sensation in her L thumb     Shoulder Range of Motion:   Shoulder Left Right   Flexion 90 150   Abduction nt nt   ER nt nt   IR nt nt       Upper Extremity Strength  (R) UE  (L) UE    Shoulder flexion: 4/5 Shoulder flexion: nt   Shoulder Abduction: 4/5 Shoulder abduction: nt   Shoulder ER 3+/5 Shoulder ER 3/5p   Shoulder IR 4+/5 Shoulder IR 4/5p   Elbow flexion: 4+/5 Elbow flexion: 4/5   Elbow extension: 4+/5 Elbow extension: 4/5   Wrist flexion: 4/5 Wrist flexion: 4/5   Wrist extension: 4/5 Wrist extension: 4/5    4/5 : 4/5   Lower Trap nt Lower Trap nt   Middle Trap nt Middle Trap nt   Rhomboids nt Rhomboids nt       TREATMENT   Treatment Time In: 2:40  Treatment Time Out: 2:55  Total Treatment time separate from Evaluation: 15 minutes    Kaylee received therapeutic exercises to develop strength and endurance for 15 minutes including:  Seated hip flexion 10 x 2  Seated knee extension x 10  Seated heel raises x 10  Standing rows 10 x 2    Home Exercises and Patient Education Provided    Education provided:   - yes    Written Home Exercises Provided: yes.  Exercises were reviewed and Kaylee was able to demonstrate them prior to the end  of the session.  Kaylee demonstrated good  understanding of the education provided.     See EMR under Patient Instructions for exercises provided 1/14/2020.    Assessment   Kaylee is a 74 y.o. female referred to outpatient Physical Therapy with a medical diagnosis of   E11.22,N18.3,Z79.4 (ICD-10-CM) - Type 2 diabetes mellitus with stage 3 chronic kidney disease, with long-term current use of insulin   I25.10 (ICD-10-CM) - Coronary artery disease involving native coronary artery of native heart without angina pectoris   R53.81 (ICD-10-CM) - Physical deconditioning   R26.81 (ICD-10-CM) - Gait instability   . Pt presents with reports that she needs to begin Cardiac Rehabilitation but is not physically able to secondary to unsteady gait and generalized weakness.  She has limited L shoulder ROM secondary to a fall and R knee pain.    Pt prognosis is Good.   Pt will benefit from skilled outpatient Physical Therapy to address the deficits stated above and in the chart below, provide pt/family education, and to maximize pt's level of independence.     Plan of care discussed with patient: Yes  Pt's spiritual, cultural and educational needs considered and patient is agreeable to the plan of care and goals as stated below:     Anticipated Barriers for therapy: none    Medical Necessity is demonstrated by the following  History  Co-morbidities and personal factors that may impact the plan of care Co-morbidities:   advanced age, CAD, diabetes and Gait instability adn Physical Deconditioning    Personal Factors:   age     moderate   Examination  Body Structures and Functions, activity limitations and participation restrictions that may impact the plan of care Body Regions:   lower extremities  upper extremities  trunk    Body Systems:    ROM  strength  balance  gait    Participation Restrictions:   Strenuous activity    Activity limitations:   Learning and applying knowledge  no deficits    General Tasks and Commands  no  deficits    Communication  no deficits    Mobility  lifting and carrying objects    Self care  no deficits    Domestic Life  doing house work (cleaning house, washing dishes, laundry)    Interactions/Relationships  no deficits    Life Areas  no deficits    Community and Social Life  no deficits         low   Clinical Presentation stable and uncomplicated low   Decision Making/ Complexity Score: low     Goals:  Short Term Goals: 2 weeks   Pt will be independent in a HEP to address their functional deficits related to entrance into the Cardiac Rehabilitation program    Long Term Goals: 4 weeks   Improve B LE MMT by 1/2 grade to show improvement in strength   Pt able to tolerate walking 300 ft over a level surface without her cane  Pt to have met criteria to enter a Cardiac Rehabilitation program    Plan   Plan of care Certification: 1/14/2020 to 3/12/2020.    Outpatient Physical Therapy 1-2 times weekly for 6 visits to include the following interventions: Gait Training, Manual Therapy, Moist Heat/ Ice, Neuromuscular Re-ed, Patient Education, Self Care, Therapeutic Activites and Therapeutic Exercise.     Patrick Covarrubias, PT

## 2020-01-17 ENCOUNTER — PATIENT OUTREACH (OUTPATIENT)
Dept: ADMINISTRATIVE | Facility: HOSPITAL | Age: 75
End: 2020-01-17

## 2020-01-17 NOTE — PROGRESS NOTES
Pre-visit chart review completed.  Immunizations reviewed and updated.    Patient due for the following    Shingles Vaccine (2 of 3)    Pneumococcal Vaccine (65+ High/Highest Risk) (2 of 2 - PPSV23)    Mammogram      Patient is scheduled to have mammogram done.

## 2020-01-17 NOTE — PROGRESS NOTES
Subjective:      Patient ID: Kaylee Romero is a 75 y.o. female.    Chief Complaint:  Diabetes    History of Present Illness  Kaylee Romero presents today for follow up of T2DM.     With regards to the diabetes:    Was seen in the hospital by KAVON Pritchard NP in 9/2019 because of a Surgical Procedure and Date: CABG 8/12/19  He started her on levemir daily.     Diagnosed: 2013  Known complications:  DKA-  RN-  PN-  Nephropathy-    Current regimen:  Trulicity 1.5 mg weekly  Glipizide 10 mg XR with breakfast   Levemir 15u HS- pcp recently increased     Other medications tried:  Jardiance    Glucose Monitor:  1 times a day testing  Log reviewed: yes, digital medicine program  1/20/2020  1:15    1/14/2020 12:39    1/10/2020  2:34    1/8/2020  9:29    12/30/2019 11:46    12/26/2019 12:30 PM 91   12/4/2019 10:53    12/4/2019  8:30    12/2/2019  2:57    11/23/2019  9:22          Diet/Exercise:  Eats 2 meals a day.   Snacks: none   Drinks: water  Exercise - tries to stay active     Hypoglycemia:  Yes, did not need assistance. One occasion, thinks it was a meter error.   Knows how to correct with 15 grams of carbs- juice, coke, or a peppermint.      Education - last visit: none  Eye Exam: 2/25/19  Podiatry: 8/29/19  Denies history of pancreatitis & personal/family history of medullary thyroid cancer.     Diabetes Management Status  Statin: Taking  ACE/ARB: Taking  Screening or Prevention Patient's value Goal Complete/Controlled?   HgA1C Testing and Control   Lab Results   Component Value Date    HGBA1C 6.7 (H) 12/11/2019    Annually/Less than 8% No   Lipid profile : 12/11/2019 Annually Yes   LDL control Lab Results   Component Value Date    LDLCALC 52.0 (L) 12/11/2019    Annually/Less than 100 mg/dl  Yes   Nephropathy screening Lab Results   Component Value Date    LABMICR 188.0 10/24/2017     Lab Results   Component Value Date    PROTEINUA 1+ (A) 08/28/2019     Annually No   Blood pressure BP Readings from Last 1 Encounters:   01/21/20 124/84    Less than 140/90 Yes   Dilated retinal exam : 06/27/2019 Annually Yes   Foot exam   : 10/21/2019 Annually Yes     With regards to osteoporosis:   BMD 3/2017  Osteoporosis of the left femoral neck.  Normal bone mineral density of the lumbar spine.  Ten year probability of major osteoporotic fracture is 9.5%, and hip fracture is 2.1%.  BMD 5/23/19  COMPARISON:  Comparison study done on 03/15/2013. Lumbar spine BMD 1.138 g/cm2 and the T-score 0.8.  The Total Hip BMD 0.928 g/cm2 and the T-score -0.1.    FINDINGS:  Lumbar Spine: Lumbar bone mineral density L1-L4 is 1.069g/cm2, which is a T-score of 0.2. The Z-score is 1.7.    Total Hip: Total hip bone mineral density is 0.808g/cm2.  The T-score is -1.1, and the Z-score is -0.2.    Femoral neck: Bone mineral density is 0.576g/cm2 and the T-score is -2.5 and the Z-score is -1.1 g/cm2.    There is a 6.7% risk of a major osteoporotic fracture and a 1.6% risk of hip fracture in the next 10 years (FRAX).    Compared with previous DXA, BMD at the lumbar spine has decreased by -6%, and the BMD at the total hip has decreased by -12.9%.      Impression     Osteoporosis of the femoral neck.  RECOMMENDATIONS of [facility]:  1.  Calcium 5743-0894 mg daily and vitamin D 800-1000 units daily, adequate exercise.  2.  Consider bisphosphonates (alendronate, risedronate, ibandronate, zolendronic acid) or denosumab.  3.  Repeat BMD in 2 years     Current meds: Fosamax  Started: 6/2019    Calcium intake?  1000 mg a day   Vit D intake?  2000 iu a day   Weight bearing exercise?   Walking   Recent falls? August 2018; September 2018 - no fractures and no falls from a standing position.      They have made fall precautions in house     No recent fractures   No significant height loss (>2 inches)    tob use ?  None  etoh use? None    Family history: none     No current  diarrhea or h/o malabsorption    Menopause at age 56    Denies chronic exposure to anticoagulants, proton pump inhibitors or antiseizure medications.   +Steroid injections knees    Denies GERD, indigestion, or gastric bypass surgery.     Denies history of thyroid disease, anemia, kidney stones or kidney disease.     Denies active malignancy, history of malignancy the involved the bone, prior radiation treatment, or unexplained elevations of alk phos on labs     With regards to the vitamin d deficiency:   Ref. Range 5/16/2019 13:27   Vit D, 25-Hydroxy Latest Ref Range: 30 - 96 ng/mL 54     currently taking otc 3000 iu a day    Review of Systems   Constitutional: Negative for unexpected weight change.   Eyes: Negative for visual disturbance.   Respiratory: Negative for shortness of breath.    Cardiovascular: Negative for chest pain.   Gastrointestinal: Negative for abdominal pain.   Endocrine: Negative for cold intolerance, heat intolerance, polydipsia, polyphagia and polyuria.   Musculoskeletal: Positive for gait problem (uses cane). Negative for arthralgias.   Skin: Negative for wound.   Neurological: Negative for headaches.   Hematological: Does not bruise/bleed easily.   Psychiatric/Behavioral: Negative for sleep disturbance.     Objective:   Physical Exam   Neck: No thyromegaly present.   Cardiovascular: Normal rate.   No edema present   Pulmonary/Chest: Effort normal.   Abdominal: Soft.   Vitals reviewed.  Appropriate footwear, Foot exam deferred 11/2018 follows with podiatry  No ulcers or wounds.   injection sites are ok. No lipo hypertropthy or atrophy    Body mass index is 41.71 kg/m².    Lab Review:   Lab Results   Component Value Date    HGBA1C 6.7 (H) 12/11/2019     Lab Results   Component Value Date    CHOL 103 (L) 12/11/2019    HDL 39 (L) 12/11/2019    LDLCALC 52.0 (L) 12/11/2019    TRIG 60 12/11/2019    CHOLHDL 37.9 12/11/2019     Lab Results   Component Value Date     11/14/2019    K 3.7  11/14/2019     11/14/2019    CO2 25 11/14/2019    GLU 50 (L) 11/14/2019    BUN 14 11/14/2019    CREATININE 1.1 11/14/2019    CALCIUM 10.2 11/14/2019    PROT 9.1 (H) 10/26/2019    ALBUMIN 3.7 10/26/2019    BILITOT 0.2 10/26/2019    ALKPHOS 109 10/26/2019    AST 29 10/26/2019    ALT 28 10/26/2019    ANIONGAP 10 11/14/2019    ESTGFRAFRICA 58.0 (A) 11/14/2019    EGFRNONAA 50.3 (A) 11/14/2019    TSH 2.307 05/23/2019       Assessment and Plan     1. Type 2 diabetes mellitus with stage 3 chronic kidney disease and hypertension  Hemoglobin A1c    Comprehensive metabolic panel    Lipid panel   2. Osteoporosis without current pathological fracture, unspecified osteoporosis type     3. Vitamin D deficiency  Vitamin D   4. CKD (chronic kidney disease) stage 3, GFR 30-59 ml/min     5. Morbid obesity with body mass index (BMI) of 40.0 or higher     6. Mixed hyperlipidemia     7. Essential hypertension     8. KAMRAN on CPAP       Type 2 diabetes mellitus with stage 3 chronic kidney disease and hypertension  -- Labs prior  -- Medication Changes:   Continue current regimen  -- Reviewed goals of therapy are to get the best control we can without hypoglycemia  -- Insulin injection sites and proper rotation instructed.    -- Advised frequent self blood glucose monitoring.  Patient encouraged to document glucose results and bring them to every clinic visit.  -- Hypoglycemia precautions discussed. Instructed on precautions before driving.    -- Call for Bg repeatedly < 90 or > 180.   -- Close adherence to lifestyle changes recommended.   -- Periodic follow ups for eye evaluations, foot care and dental care suggested. UTD    Osteoporosis without current pathological fracture  -- Continue fosamax weekly  -- Reviewed basics of quantity versus quality  -- Reassuring she is not fracturing   -- RDA of calcium (4564-3961 mg /day in divided doses) and vitamin D 2000iu a day  discussed  -- Calcium from food sources preferred  -- Fall  precautions emphasized  -- +weight bearing exercise  -- NOF.org website information given  -- Pharmacological therapy is recommended for patients with osteopenia if the 10 year probability of a hip fracture is >3% and 10 year probability of other major osteoporotic fractures is >20%.   -- Treatment options and potential side effects discussed for PO bisphosphonates, reclast, Denosumab, and Teriparatide.  -- Low risk for ONJ and atypical fractures reviewed and discussed. Alerted that if dental work needs to be done it should be done prior to initiating therapy   -- Discussed optimal duration of bisphosphonate use is unknown - drug holiday after 5-10 po versus drug hold ay after 3 reclast treatment   -- Repeat DEXA scan in 2021    CKD (chronic kidney disease) stage 3, GFR 30-59 ml/min  -- Stable    Morbid obesity with body mass index (BMI) of 40.0 or higher  -- encouraged dietary and lifestyle modifications   -- emphasized weight loss goals       Hyperlipidemia  -- Controlled   -- On statin per ADA recommendations    Essential hypertension  -- on ARB  -- Controlled  -- Blood pressure goals discussed with patient    KAMRAN on CPAP  -- Continue CPAP use    Follow up in about 4 months (around 5/21/2020).  Labs prior

## 2020-01-20 ENCOUNTER — PATIENT OUTREACH (OUTPATIENT)
Dept: ADMINISTRATIVE | Facility: OTHER | Age: 75
End: 2020-01-20

## 2020-01-21 ENCOUNTER — OFFICE VISIT (OUTPATIENT)
Dept: ENDOCRINOLOGY | Facility: CLINIC | Age: 75
End: 2020-01-21
Payer: MEDICARE

## 2020-01-21 VITALS
SYSTOLIC BLOOD PRESSURE: 124 MMHG | HEIGHT: 62 IN | BODY MASS INDEX: 41.97 KG/M2 | DIASTOLIC BLOOD PRESSURE: 84 MMHG | WEIGHT: 228.06 LBS | HEART RATE: 88 BPM

## 2020-01-21 DIAGNOSIS — I10 ESSENTIAL HYPERTENSION: ICD-10-CM

## 2020-01-21 DIAGNOSIS — E11.22 TYPE 2 DIABETES MELLITUS WITH STAGE 3 CHRONIC KIDNEY DISEASE AND HYPERTENSION: Primary | ICD-10-CM

## 2020-01-21 DIAGNOSIS — E78.2 MIXED HYPERLIPIDEMIA: ICD-10-CM

## 2020-01-21 DIAGNOSIS — E55.9 VITAMIN D DEFICIENCY: ICD-10-CM

## 2020-01-21 DIAGNOSIS — G47.33 OSA ON CPAP: ICD-10-CM

## 2020-01-21 DIAGNOSIS — M81.0 OSTEOPOROSIS WITHOUT CURRENT PATHOLOGICAL FRACTURE, UNSPECIFIED OSTEOPOROSIS TYPE: ICD-10-CM

## 2020-01-21 DIAGNOSIS — I12.9 TYPE 2 DIABETES MELLITUS WITH STAGE 3 CHRONIC KIDNEY DISEASE AND HYPERTENSION: Primary | ICD-10-CM

## 2020-01-21 DIAGNOSIS — E66.01 MORBID OBESITY WITH BODY MASS INDEX (BMI) OF 40.0 OR HIGHER: ICD-10-CM

## 2020-01-21 DIAGNOSIS — N18.30 TYPE 2 DIABETES MELLITUS WITH STAGE 3 CHRONIC KIDNEY DISEASE AND HYPERTENSION: Primary | ICD-10-CM

## 2020-01-21 DIAGNOSIS — N18.30 CKD (CHRONIC KIDNEY DISEASE) STAGE 3, GFR 30-59 ML/MIN: ICD-10-CM

## 2020-01-21 PROCEDURE — 99499 UNLISTED E&M SERVICE: CPT | Mod: S$GLB,,, | Performed by: NURSE PRACTITIONER

## 2020-01-21 PROCEDURE — 3044F HG A1C LEVEL LT 7.0%: CPT | Mod: CPTII,S$GLB,, | Performed by: NURSE PRACTITIONER

## 2020-01-21 PROCEDURE — 99999 PR PBB SHADOW E&M-EST. PATIENT-LVL III: CPT | Mod: PBBFAC,,, | Performed by: NURSE PRACTITIONER

## 2020-01-21 PROCEDURE — 99214 OFFICE O/P EST MOD 30 MIN: CPT | Mod: S$GLB,,, | Performed by: NURSE PRACTITIONER

## 2020-01-21 PROCEDURE — 1101F PR PT FALLS ASSESS DOC 0-1 FALLS W/OUT INJ PAST YR: ICD-10-PCS | Mod: CPTII,S$GLB,, | Performed by: NURSE PRACTITIONER

## 2020-01-21 PROCEDURE — 1126F PR PAIN SEVERITY QUANTIFIED, NO PAIN PRESENT: ICD-10-PCS | Mod: S$GLB,,, | Performed by: NURSE PRACTITIONER

## 2020-01-21 PROCEDURE — 99214 PR OFFICE/OUTPT VISIT, EST, LEVL IV, 30-39 MIN: ICD-10-PCS | Mod: S$GLB,,, | Performed by: NURSE PRACTITIONER

## 2020-01-21 PROCEDURE — 1159F MED LIST DOCD IN RCRD: CPT | Mod: S$GLB,,, | Performed by: NURSE PRACTITIONER

## 2020-01-21 PROCEDURE — 1159F PR MEDICATION LIST DOCUMENTED IN MEDICAL RECORD: ICD-10-PCS | Mod: S$GLB,,, | Performed by: NURSE PRACTITIONER

## 2020-01-21 PROCEDURE — 3079F DIAST BP 80-89 MM HG: CPT | Mod: CPTII,S$GLB,, | Performed by: NURSE PRACTITIONER

## 2020-01-21 PROCEDURE — 1126F AMNT PAIN NOTED NONE PRSNT: CPT | Mod: S$GLB,,, | Performed by: NURSE PRACTITIONER

## 2020-01-21 PROCEDURE — 1101F PT FALLS ASSESS-DOCD LE1/YR: CPT | Mod: CPTII,S$GLB,, | Performed by: NURSE PRACTITIONER

## 2020-01-21 PROCEDURE — 99999 PR PBB SHADOW E&M-EST. PATIENT-LVL III: ICD-10-PCS | Mod: PBBFAC,,, | Performed by: NURSE PRACTITIONER

## 2020-01-21 PROCEDURE — 3074F PR MOST RECENT SYSTOLIC BLOOD PRESSURE < 130 MM HG: ICD-10-PCS | Mod: CPTII,S$GLB,, | Performed by: NURSE PRACTITIONER

## 2020-01-21 PROCEDURE — 3074F SYST BP LT 130 MM HG: CPT | Mod: CPTII,S$GLB,, | Performed by: NURSE PRACTITIONER

## 2020-01-21 PROCEDURE — 3079F PR MOST RECENT DIASTOLIC BLOOD PRESSURE 80-89 MM HG: ICD-10-PCS | Mod: CPTII,S$GLB,, | Performed by: NURSE PRACTITIONER

## 2020-01-21 PROCEDURE — 3044F PR MOST RECENT HEMOGLOBIN A1C LEVEL <7.0%: ICD-10-PCS | Mod: CPTII,S$GLB,, | Performed by: NURSE PRACTITIONER

## 2020-01-21 PROCEDURE — 99499 RISK ADDL DX/OHS AUDIT: ICD-10-PCS | Mod: S$GLB,,, | Performed by: NURSE PRACTITIONER

## 2020-01-21 NOTE — ASSESSMENT & PLAN NOTE
-- Continue fosamax weekly  -- Reviewed basics of quantity versus quality  -- Reassuring she is not fracturing   -- RDA of calcium (0674-1908 mg /day in divided doses) and vitamin D 2000iu a day  discussed  -- Calcium from food sources preferred  -- Fall precautions emphasized  -- +weight bearing exercise  -- [employer] website information given  -- Pharmacological therapy is recommended for patients with osteopenia if the 10 year probability of a hip fracture is >3% and 10 year probability of other major osteoporotic fractures is >20%.   -- Treatment options and potential side effects discussed for PO bisphosphonates, reclast, Denosumab, and Teriparatide.  -- Low risk for ONJ and atypical fractures reviewed and discussed. Alerted that if dental work needs to be done it should be done prior to initiating therapy   -- Discussed optimal duration of bisphosphonate use is unknown - drug holiday after 5-10 po versus drug hold ay after 3 reclast treatment   -- Repeat DEXA scan in 2021

## 2020-01-21 NOTE — ASSESSMENT & PLAN NOTE
-- Labs prior  -- Medication Changes:   Continue current regimen  -- Reviewed goals of therapy are to get the best control we can without hypoglycemia  -- Insulin injection sites and proper rotation instructed.    -- Advised frequent self blood glucose monitoring.  Patient encouraged to document glucose results and bring them to every clinic visit.  -- Hypoglycemia precautions discussed. Instructed on precautions before driving.    -- Call for Bg repeatedly < 90 or > 180.   -- Close adherence to lifestyle changes recommended.   -- Periodic follow ups for eye evaluations, foot care and dental care suggested. UTD

## 2020-01-23 ENCOUNTER — CLINICAL SUPPORT (OUTPATIENT)
Dept: REHABILITATION | Facility: HOSPITAL | Age: 75
End: 2020-01-23
Attending: INTERNAL MEDICINE
Payer: MEDICARE

## 2020-01-23 DIAGNOSIS — R53.81 PHYSICAL DEBILITY: ICD-10-CM

## 2020-01-23 PROCEDURE — 97530 THERAPEUTIC ACTIVITIES: CPT | Mod: PO

## 2020-01-23 PROCEDURE — 97110 THERAPEUTIC EXERCISES: CPT | Mod: PO

## 2020-01-23 NOTE — PROGRESS NOTES
Physical Therapy Daily Treatment Note     Name: Kaylee Urena [place]  Clinic Number: [id number]    Therapy Diagnosis: No diagnosis found.  Physician: Liz Roberts, *    Visit Date: 1/23/2020  Physician Orders: PT Eval and Treat   Medical Diagnosis from Referral:   E11.22,N18.3,Z79.4 (ICD-10-CM) - Type 2 diabetes mellitus with stage 3 chronic kidney disease, with long-term current use of insulin   I25.10 (ICD-10-CM) - Coronary artery disease involving native coronary artery of native heart without angina pectoris   R53.81 (ICD-10-CM) - Physical deconditioning   R26.81 (ICD-10-CM) - Gait instability         Evaluation Date: 1/14/2020  Authorization Period Expiration: 12/30/2020  Plan of Care Expiration: 3/12/2020  Visit # / Visits authorized: 1/ 1     Time In: 2:00  Time Out: 3:00  Total Billable Time: 60 minutes     Precautions: Standard, Diabetes and CAD      Subjective     Pt reports: that she gets out of breath easily.  She was compliant with home exercise program.  Response to previous treatment: Pt tolerated her initial evaluation well  Functional change: none    Pain: 2/10  Location: right knee      Objective   Lower Extremity Strength  Right LE   Left LE     Knee extension: 4/5 Knee extension: 4/5   Knee flexion: 3/5 Knee flexion: 3/5   Hip flexion: 3/5 Hip flexion: 3/5   Hip extension:  3+/5 Hip extension: 3/5   Hip abduction: 2/5 Hip abduction: 2+/5   Hip adduction: 4-/5 Hip adduction 4/5   Ankle dorsiflexion: nt Ankle dorsiflexion: nt   Ankle plantarflexion: nt Ankle plantarflexion: nt          Kaylee received therapeutic exercises to develop strength and endurance for 15 minutes including:  Supine B hip adduction 10 x 2 with a ball - pt instructed in a new exercise  Supine B hip abduction 10 x 2 with an orange TB - pt instructed in a new exercise  Gluteal sets 10 x 2 - pt instructed in a new exercise  Seated hip flexion 10 x 2  Seated knee extension x 10      Not performed  Seated heel raises x  10  Standing rows 10 x 2  Kaylee participated in neuromuscular re-education activities to improve: Balance for 10  minutes. The following activities were included:  Pt ambulated around the clinic, 300 ft, with SBA and no assistive device one time half way through her exercises and one time following her exercises    Home Exercises Provided and Patient Education Provided     Education provided:   - yes    Written Home Exercises Provided: yes.  Exercises were reviewed and Kaylee was able to demonstrate them prior to the end of the session.  Kaylee demonstrated good  understanding of the education provided.     See EMR under Patient Instructions for exercises provided 1/23/2020.    Assessment     Pt was able to tolerate only 2 sets of 10 repetitions of each exercise and ambulation with SBA without an assistive device as indicated above.  Kaylee is progressing well towards her goals.   Pt prognosis is Good.     Pt will continue to benefit from skilled outpatient physical therapy to address the deficits listed in the problem list box on initial evaluation, provide pt/family education and to maximize pt's level of independence in the home and community environment.     Pt's spiritual, cultural and educational needs considered and pt agreeable to plan of care and goals.     Anticipated barriers to physical therapy: none    Goals:   Short Term Goals: 2 weeks   Pt will be independent in a HEP to address their functional deficits related to entrance into the Cardiac Rehabilitation program     Long Term Goals: 4 weeks   Improve B LE MMT by 1/2 grade to show improvement in strength   Pt able to tolerate walking 300 ft over a level surface without her cane  Pt to have met criteria to enter a Cardiac Rehabilitation program  Plan     Progress Physical Therapy treatments to have pat ready to enter a cardiac rehabilitation program.    Patrick Covarrubias, PT

## 2020-01-28 ENCOUNTER — TELEPHONE (OUTPATIENT)
Dept: SPORTS MEDICINE | Facility: CLINIC | Age: 75
End: 2020-01-28

## 2020-01-28 ENCOUNTER — CLINICAL SUPPORT (OUTPATIENT)
Dept: REHABILITATION | Facility: HOSPITAL | Age: 75
End: 2020-01-28
Attending: INTERNAL MEDICINE
Payer: MEDICARE

## 2020-01-28 DIAGNOSIS — R53.81 PHYSICAL DEBILITY: ICD-10-CM

## 2020-01-28 DIAGNOSIS — Z71.3 NUTRITIONAL COUNSELING: Primary | ICD-10-CM

## 2020-01-28 PROCEDURE — 97110 THERAPEUTIC EXERCISES: CPT | Mod: PO

## 2020-01-28 NOTE — PROGRESS NOTES
Physical Therapy Daily Treatment Note     Name: Kaylee Urena [facility] Number: [id number]    Therapy Diagnosis:   Encounter Diagnosis   Name Primary?    Physical debility      Physician: Liz Roberts, *    Visit Date: 1/28/2020  Physician Orders: PT Eval and Treat   Medical Diagnosis from Referral:   E11.22,N18.3,Z79.4 (ICD-10-CM) - Type 2 diabetes mellitus with stage 3 chronic kidney disease, with long-term current use of insulin   I25.10 (ICD-10-CM) - Coronary artery disease involving native coronary artery of native heart without angina pectoris   R53.81 (ICD-10-CM) - Physical deconditioning   R26.81 (ICD-10-CM) - Gait instability         Evaluation Date: 1/14/2020  Authorization Period Expiration: 12/30/2020  Plan of Care Expiration: 3/12/2020  Visit # / Visits authorized: 3/ 6     Time In: 2:00  Time Out: 3:00  Total Billable Time: 60 minutes     Precautions: Standard, Diabetes and CAD      Subjective     Pt reports: that she is feeling fine today.  She was compliant with home exercise program.  Response to previous treatment: Pt tolerated her initial evaluation well  Functional change: none    Pain: 2/10  Location: right knee      Objective   Lower Extremity Strength  Right LE   Left LE     Knee extension: 4/5 Knee extension: 4/5   Knee flexion: 3/5 Knee flexion: 3/5   Hip flexion: 3/5 Hip flexion: 3/5   Hip extension:  3+/5 Hip extension: 3/5   Hip abduction: 2/5 Hip abduction: 2+/5   Hip adduction: 4-/5 Hip adduction 4/5   Ankle dorsiflexion: nt Ankle dorsiflexion: nt   Ankle plantarflexion: nt Ankle plantarflexion: nt          Kaylee received therapeutic exercises to develop strength and endurance for  minutes including:  Supine B hip adduction 10 x 5 with a ball   Supine B hip abduction 10 x 5 with an orange TB   Gluteal sets 10 x 5   Seated hip flexion 10 x 5  Seated knee extension 10 x 5  Heel slides 10 x 3 on R & L  Seated heel raises 10 x 5    Not performed  Standing rows 10 x 2  Kaylee  participated in neuromuscular re-education activities to improve: Balance for 10  minutes. The following activities were included:  Pt ambulated around the clinic, 300 ft, with SBA and no assistive device one time half way through her exercises and one time following her exercises    Home Exercises Provided and Patient Education Provided     Education provided:   - yes    Written Home Exercises Provided: yes.  Exercises were reviewed and Kaylee was able to demonstrate them prior to the end of the session.  Kaylee demonstrated good  understanding of the education provided.     See EMR under Patient Instructions for exercises provided 1/23/2020.    Assessment     Pt was able to tolerate the addition repetitions and heel slides today well.  Kaylee is progressing well towards her goals.   Pt prognosis is Good.     Pt will continue to benefit from skilled outpatient physical therapy to address the deficits listed in the problem list box on initial evaluation, provide pt/family education and to maximize pt's level of independence in the home and community environment.     Pt's spiritual, cultural and educational needs considered and pt agreeable to plan of care and goals.     Anticipated barriers to physical therapy: none    Goals:   Short Term Goals: 2 weeks   Pt will be independent in a HEP to address their functional deficits related to entrance into the Cardiac Rehabilitation program     Long Term Goals: 4 weeks   Improve B LE MMT by 1/2 grade to show improvement in strength   Pt able to tolerate walking 300 ft over a level surface without her cane  Pt to have met criteria to enter a Cardiac Rehabilitation program  Plan     Progress Physical Therapy treatments to have pat ready to enter a cardiac rehabilitation program.    Patrick Covarrubias, PT

## 2020-01-28 NOTE — TELEPHONE ENCOUNTER
----- Message from Alexis Weilbaecher, RD sent at 1/28/2020 10:00 AM CST -----  Elevated blood glucose referral please

## 2020-01-29 ENCOUNTER — PATIENT OUTREACH (OUTPATIENT)
Dept: ADMINISTRATIVE | Facility: OTHER | Age: 75
End: 2020-01-29

## 2020-01-29 NOTE — PROGRESS NOTES
Chart reviewed.   Immunizations: updated  Orders placed: na  Upcoming appts: mammogram scheduled 01/31/20

## 2020-01-30 ENCOUNTER — OFFICE VISIT (OUTPATIENT)
Dept: ORTHOPEDICS | Facility: CLINIC | Age: 75
End: 2020-01-30
Payer: MEDICARE

## 2020-01-30 ENCOUNTER — TELEPHONE (OUTPATIENT)
Dept: SPORTS MEDICINE | Facility: CLINIC | Age: 75
End: 2020-01-30

## 2020-01-30 ENCOUNTER — CLINICAL SUPPORT (OUTPATIENT)
Dept: REHABILITATION | Facility: HOSPITAL | Age: 75
End: 2020-01-30
Attending: INTERNAL MEDICINE
Payer: MEDICARE

## 2020-01-30 VITALS
SYSTOLIC BLOOD PRESSURE: 133 MMHG | HEIGHT: 62 IN | BODY MASS INDEX: 42.45 KG/M2 | WEIGHT: 230.69 LBS | HEART RATE: 87 BPM | DIASTOLIC BLOOD PRESSURE: 83 MMHG

## 2020-01-30 DIAGNOSIS — R73.9 BLOOD GLUCOSE ELEVATED: Primary | ICD-10-CM

## 2020-01-30 DIAGNOSIS — R53.81 PHYSICAL DEBILITY: ICD-10-CM

## 2020-01-30 DIAGNOSIS — M17.11 PRIMARY OSTEOARTHRITIS OF RIGHT KNEE: Primary | ICD-10-CM

## 2020-01-30 PROCEDURE — 3079F PR MOST RECENT DIASTOLIC BLOOD PRESSURE 80-89 MM HG: ICD-10-PCS | Mod: CPTII,S$GLB,, | Performed by: ORTHOPAEDIC SURGERY

## 2020-01-30 PROCEDURE — 20610 LARGE JOINT ASPIRATION/INJECTION: R KNEE: ICD-10-PCS | Mod: RT,S$GLB,, | Performed by: ORTHOPAEDIC SURGERY

## 2020-01-30 PROCEDURE — 20610 DRAIN/INJ JOINT/BURSA W/O US: CPT | Mod: RT,S$GLB,, | Performed by: ORTHOPAEDIC SURGERY

## 2020-01-30 PROCEDURE — 99214 PR OFFICE/OUTPT VISIT, EST, LEVL IV, 30-39 MIN: ICD-10-PCS | Mod: 25,S$GLB,, | Performed by: ORTHOPAEDIC SURGERY

## 2020-01-30 PROCEDURE — 97110 THERAPEUTIC EXERCISES: CPT | Mod: PO

## 2020-01-30 PROCEDURE — 3079F DIAST BP 80-89 MM HG: CPT | Mod: CPTII,S$GLB,, | Performed by: ORTHOPAEDIC SURGERY

## 2020-01-30 PROCEDURE — 1125F PR PAIN SEVERITY QUANTIFIED, PAIN PRESENT: ICD-10-PCS | Mod: S$GLB,,, | Performed by: ORTHOPAEDIC SURGERY

## 2020-01-30 PROCEDURE — 99999 PR PBB SHADOW E&M-EST. PATIENT-LVL III: CPT | Mod: PBBFAC,,, | Performed by: ORTHOPAEDIC SURGERY

## 2020-01-30 PROCEDURE — 1159F MED LIST DOCD IN RCRD: CPT | Mod: S$GLB,,, | Performed by: ORTHOPAEDIC SURGERY

## 2020-01-30 PROCEDURE — 1159F PR MEDICATION LIST DOCUMENTED IN MEDICAL RECORD: ICD-10-PCS | Mod: S$GLB,,, | Performed by: ORTHOPAEDIC SURGERY

## 2020-01-30 PROCEDURE — 3075F SYST BP GE 130 - 139MM HG: CPT | Mod: CPTII,S$GLB,, | Performed by: ORTHOPAEDIC SURGERY

## 2020-01-30 PROCEDURE — 1101F PR PT FALLS ASSESS DOC 0-1 FALLS W/OUT INJ PAST YR: ICD-10-PCS | Mod: CPTII,S$GLB,, | Performed by: ORTHOPAEDIC SURGERY

## 2020-01-30 PROCEDURE — 1125F AMNT PAIN NOTED PAIN PRSNT: CPT | Mod: S$GLB,,, | Performed by: ORTHOPAEDIC SURGERY

## 2020-01-30 PROCEDURE — 99214 OFFICE O/P EST MOD 30 MIN: CPT | Mod: 25,S$GLB,, | Performed by: ORTHOPAEDIC SURGERY

## 2020-01-30 PROCEDURE — 99999 PR PBB SHADOW E&M-EST. PATIENT-LVL III: ICD-10-PCS | Mod: PBBFAC,,, | Performed by: ORTHOPAEDIC SURGERY

## 2020-01-30 PROCEDURE — 3075F PR MOST RECENT SYSTOLIC BLOOD PRESS GE 130-139MM HG: ICD-10-PCS | Mod: CPTII,S$GLB,, | Performed by: ORTHOPAEDIC SURGERY

## 2020-01-30 PROCEDURE — 1101F PT FALLS ASSESS-DOCD LE1/YR: CPT | Mod: CPTII,S$GLB,, | Performed by: ORTHOPAEDIC SURGERY

## 2020-01-30 RX ORDER — TRIAMCINOLONE ACETONIDE 40 MG/ML
60 INJECTION, SUSPENSION INTRA-ARTICULAR; INTRAMUSCULAR
Status: DISCONTINUED | OUTPATIENT
Start: 2020-01-30 | End: 2020-01-30 | Stop reason: HOSPADM

## 2020-01-30 RX ADMIN — TRIAMCINOLONE ACETONIDE 60 MG: 40 INJECTION, SUSPENSION INTRA-ARTICULAR; INTRAMUSCULAR at 11:01

## 2020-01-30 NOTE — PROGRESS NOTES
HPI:    Kaylee Romero is a 75 y.o. female who is here today for   Chief Complaint   Patient presents with    Right Knee - Pain   .  Patient had a left total knee done by me 05/01/2017 and did well.  She has had medical problems.  Underwent open-heart coronary bypass surgery in August 2019 and then 2 months later had additional stents placed.  She started cardiac rehab and the right knee is now bothering her.    Duration: 6 months  Intensity: severe  Character of pain: achy  Location: She reports that the pain is predominately  medial    Past Medical History:   Diagnosis Date    Acid reflux     Age-related osteoporosis without current pathological fracture 1/11/2019    Cataract     CKD (chronic kidney disease) stage 3, GFR 30-59 ml/min     Dry mouth     History of TIA (transient ischemic attack) 12/11/2018    Hyperlipidemia 12/2/2014    Hypertensive heart disease with diastolic heart failure 11/28/2012    Morbid obesity with body mass index (BMI) of 40.0 or higher 5/9/2016    KAMRAN (obstructive sleep apnea)     KAMRAN on CPAP 6/28/2016    Osteoporosis without current pathological fracture 11/11/2018    Physical deconditioning 11/5/2018    Status post total left knee replacement 5/1/2017 5/16/2017    Type 2 diabetes mellitus with stage 3 chronic kidney disease, without long-term current use of insulin 5/16/2019          Current Outpatient Medications:     alcohol swabs (ALCOHOL PADS) PadM, Apply 1 each topically as needed., Disp: 11 each, Rfl: 11    alendronate (FOSAMAX) 70 MG tablet, Take 1 tablet (70 mg total) by mouth every 7 days. Take with a full glass of water & remain upright for at least 30 minutes, Disp: 12 tablet, Rfl: 3    amLODIPine (NORVASC) 10 MG tablet, Take 1 tablet (10 mg total) by mouth once daily., Disp: 30 tablet, Rfl: 11    aspirin 81 MG Chew, Take 1 tablet (81 mg total) by mouth once daily., Disp: , Rfl: 0    atorvastatin (LIPITOR) 80 MG tablet, Take 1 tablet (80 mg total) by  "mouth once daily., Disp: 90 tablet, Rfl: 3    blood sugar diagnostic Strp, 1 strip by Misc.(Non-Drug; Combo Route) route once daily., Disp: 100 strip, Rfl: 3    carvedilol (COREG) 12.5 MG tablet, Take 1 tablet (12.5 mg total) by mouth 2 (two) times daily with meals., Disp: 60 tablet, Rfl: 11    clopidogrel (PLAVIX) 75 mg tablet, Take 1 tablet (75 mg total) by mouth once daily., Disp: 30 tablet, Rfl: 10    diclofenac sodium (VOLTAREN) 1 % Gel, Apply 2 g topically 4 (four) times daily., Disp: 1 Tube, Rfl: 2    dulaglutide (TRULICITY) 1.5 mg/0.5 mL PnIj, Inject 1.5 mg into the skin every 7 days., Disp: , Rfl:     ferrous sulfate (FEOSOL) 325 mg (65 mg iron) Tab tablet, Take 1 tablet (325 mg total) by mouth once daily., Disp: 90 tablet, Rfl: 0    flash glucose scanning reader Misc, Use as directed., Disp: 1 each, Rfl: 0    flash glucose sensor Kit, 1 Device by Misc.(Non-Drug; Combo Route) route every 14 (fourteen) days., Disp: 2 kit, Rfl: 5    glipiZIDE (GLUCOTROL) 10 MG tablet, Take 1 tablet (10 mg total) by mouth daily with breakfast., Disp: 30 tablet, Rfl: 1    insulin detemir U-100 (LEVEMIR FLEXTOUCH) 100 unit/mL (3 mL) SubQ InPn pen, Inject 15 Units into the skin every evening., Disp: 1 Box, Rfl: 2    lancets Misc, 1 lancet by Misc.(Non-Drug; Combo Route) route once daily., Disp: 100 each, Rfl: 3    multivitamin (ONE DAILY MULTIVITAMIN) per tablet, Take 1 tablet by mouth once daily., Disp: , Rfl:     nitroGLYCERIN (NITROSTAT) 0.4 MG SL tablet, Place 1 tablet (0.4 mg total) under the tongue every 5 (five) minutes as needed for Chest pain., Disp: 30 tablet, Rfl: 1    pen needle, diabetic 31 gauge x 5/16" Ndle, Use every evening., Disp: 100 each, Rfl: 11    tiZANidine (ZANAFLEX) 4 MG tablet, Take 1 tablet (4 mg total) by mouth 2 (two) times daily as needed (Muscle spasm.)., Disp: 30 tablet, Rfl: 1    valsartan (DIOVAN) 320 MG tablet, Take 1 tablet (320 mg total) by mouth once daily., Disp: 30 tablet, " "Rfl: 5     Review of patient's allergies indicates:   Allergen Reactions    Bactrim [sulfamethoxazole-trimethoprim] Other (See Comments)     Generic version  Sulfa makes her sick    Keflex [cephalexin] Other (See Comments)     Turns orange        ROS  Constitutional: Negative for fever, Negative for weight loss  HENT Negative for congestion  Cardiovascular: Negative chest pain  Respiratory: Negative Shortness of breath  Heme: Positive excessive bleeding  Skin:NegativeItching, Negative breakdown  Musculoskeletal:Positive for back pain, Positive for joint pain, Negative muscle pain, Negative muscle weakness  Neurological: Negative for numbness and paresthesias   Psychiatric/Behavioral: Negative altered mental status, Negative for depression    Physical Exam:   /83   Pulse 87   Ht 5' 2" (1.575 m)   Wt 104.6 kg (230 lb 11.4 oz)   LMP 01/09/2001 (Approximate)   BMI 42.20 kg/m²   General appearance: This is a well-developed, Well nourished female No obvious acute distress.  Psychiatric: normal mood and affect;  pleasant and conversant; judgment and thought content normal  Gait is coordinated. Patient has antalgic gait to the right  Cardiovascular: There are no swelling or varicosities present.   Respiratory: normal respiratory effort   Lymphatic: no adenopathy   Neurologic: alert and oriented to person, place, and time   Deep Tendon Reflexes are normal;  Coordination and Balance: Normal   Musculoskeletal   Neck    ROM shows normal flexion and extension and lateral rotation    Palpation: Non-tender    Stability is normal    Strength is normal    Skin is normal without masses and lesions    Sensation is intact to light touch   Back    ROM showsabnormal flexion, extension    and  rotation    Palpation shows no masses    Stability is normal    Strength to flexion and extension well maintained    Core strength is diminished    Skin shows no rashes or cafe au lait spots;     Sensation is intact to light " touch    Right Knee  Swelling Mild  TendernessMedial joint line  Range of Motion: 5-110    Left Knee: Swelling None  TendernessNone  Range of Motion: 120    Radiograph   Osteoarthritis: severe  Angle: mild varus    Assessment:  Osteoarthritis right knee    Plan:  Inject right knee

## 2020-01-30 NOTE — TELEPHONE ENCOUNTER
----- Message from Alexis Weilbaecher, RD sent at 1/30/2020  9:40 AM CST -----  Nutrition referral for elevated blood glucose

## 2020-01-30 NOTE — PROGRESS NOTES
Physical Therapy Daily Treatment Note     Name: Kaylee Urena [facility] Number: [id number]    Therapy Diagnosis:   Encounter Diagnosis   Name Primary?    Physical debility      Physician: Liz Roberts, *    Visit Date: 1/30/2020  Physician Orders: PT Eval and Treat   Medical Diagnosis from Referral:   E11.22,N18.3,Z79.4 (ICD-10-CM) - Type 2 diabetes mellitus with stage 3 chronic kidney disease, with long-term current use of insulin   I25.10 (ICD-10-CM) - Coronary artery disease involving native coronary artery of native heart without angina pectoris   R53.81 (ICD-10-CM) - Physical deconditioning   R26.81 (ICD-10-CM) - Gait instability         Evaluation Date: 1/14/2020  Authorization Period Expiration: 12/30/2020  Plan of Care Expiration: 3/12/2020  Visit # / Visits authorized: 2/ 6     Time In: 2:00  Time Out: 3:00  Total Billable Time: 60 minutes     Precautions: Standard, Diabetes and CAD      Subjective     Pt reports: that she is feeling fine today.  She was compliant with home exercise program.  Response to previous treatment: Pt tolerated her initial evaluation well  Functional change: none    Pain: 2/10  Location: right knee      Objective   Lower Extremity Strength  Right LE   Left LE     Knee extension: 4/5 Knee extension: 4/5   Knee flexion: 3/5 Knee flexion: 3/5   Hip flexion: 3/5 Hip flexion: 3/5   Hip extension:  3+/5 Hip extension: 3/5   Hip abduction: 2/5 Hip abduction: 2+/5   Hip adduction: 4-/5 Hip adduction 4/5   Ankle dorsiflexion: nt Ankle dorsiflexion: nt   Ankle plantarflexion: nt Ankle plantarflexion: nt          Kaylee received therapeutic exercises to develop strength and endurance for  minutes including:  Supine B hip adduction 10 x 5 with a ball   Supine B hip abduction 10 x 5 with an orange TB   Gluteal sets 10 x 5   Seated hip flexion 10 x 5  Seated knee extension 10 x 5  Heel slides 10 x 3 on R & L  Seated heel raises 10 x 5    Not performed  Standing rows 10 x 2  Kaylee  participated in neuromuscular re-education activities to improve: Balance for 00  minutes. The following activities were included:  Pt ambulated around the clinic, 300 ft, with SBA and no assistive device one time half way through her exercises and one time following her exercises    Pt received an Ice pack to her R knee post Tx.  Home Exercises Provided and Patient Education Provided     Education provided:   - yes    Written Home Exercises Provided: yes.  Exercises were reviewed and Kaylee was able to demonstrate them prior to the end of the session.  Kaylee demonstrated good  understanding of the education provided.     See EMR under Patient Instructions for exercises provided 1/23/2020.    Assessment     Pt was able to tolerate Tx with some increase in R knee pain..  Kaylee is progressing well towards her goals.   Pt prognosis is Good.     Pt will continue to benefit from skilled outpatient physical therapy to address the deficits listed in the problem list box on initial evaluation, provide pt/family education and to maximize pt's level of independence in the home and community environment.     Pt's spiritual, cultural and educational needs considered and pt agreeable to plan of care and goals.     Anticipated barriers to physical therapy: none    Goals:   Short Term Goals: 2 weeks   Pt will be independent in a HEP to address their functional deficits related to entrance into the Cardiac Rehabilitation program     Long Term Goals: 4 weeks   Improve B LE MMT by 1/2 grade to show improvement in strength   Pt able to tolerate walking 300 ft over a level surface without her cane  Pt to have met criteria to enter a Cardiac Rehabilitation program  Plan     Progress Physical Therapy treatments to have pat ready to enter a cardiac rehabilitation program.    Patrick Covarrubias, PT

## 2020-01-30 NOTE — PROCEDURES
Large Joint Aspiration/Injection: R knee  Date/Time: 1/30/2020 11:30 AM  Performed by: John L. Ochsner Jr., MD  Authorized by: John L. Ochsner Jr., MD     Consent Done?:  Yes (Verbal)  Indications:  Pain  Procedure site marked: Yes    Timeout: Prior to procedure the correct patient, procedure, and site was verified      Location:  Knee  Site:  R knee  Prep: Patient was prepped and draped in usual sterile fashion    Needle size:  22 G  Ultrasonic Guidance for needle placement: No  Medications:  60 mg triamcinolone acetonide 40 mg/mL  Patient tolerance:  Patient tolerated the procedure well with no immediate complications

## 2020-01-31 ENCOUNTER — OFFICE VISIT (OUTPATIENT)
Dept: PRIMARY CARE CLINIC | Facility: CLINIC | Age: 75
End: 2020-01-31
Payer: MEDICARE

## 2020-01-31 ENCOUNTER — HOSPITAL ENCOUNTER (OUTPATIENT)
Dept: RADIOLOGY | Facility: HOSPITAL | Age: 75
Discharge: HOME | End: 2020-01-31
Attending: INTERNAL MEDICINE
Payer: MEDICARE

## 2020-01-31 VITALS
DIASTOLIC BLOOD PRESSURE: 80 MMHG | BODY MASS INDEX: 42.63 KG/M2 | WEIGHT: 231.69 LBS | HEIGHT: 62 IN | SYSTOLIC BLOOD PRESSURE: 142 MMHG | HEART RATE: 88 BPM

## 2020-01-31 DIAGNOSIS — L30.4 INTERTRIGO: ICD-10-CM

## 2020-01-31 DIAGNOSIS — Z79.4 TYPE 2 DIABETES MELLITUS WITH STAGE 3 CHRONIC KIDNEY DISEASE, WITH LONG-TERM CURRENT USE OF INSULIN: ICD-10-CM

## 2020-01-31 DIAGNOSIS — I25.10 CORONARY ARTERY DISEASE INVOLVING NATIVE CORONARY ARTERY OF NATIVE HEART WITHOUT ANGINA PECTORIS: ICD-10-CM

## 2020-01-31 DIAGNOSIS — E11.22 TYPE 2 DIABETES MELLITUS WITH STAGE 3 CHRONIC KIDNEY DISEASE, WITH LONG-TERM CURRENT USE OF INSULIN: ICD-10-CM

## 2020-01-31 DIAGNOSIS — N18.30 TYPE 2 DIABETES MELLITUS WITH STAGE 3 CHRONIC KIDNEY DISEASE, WITH LONG-TERM CURRENT USE OF INSULIN: ICD-10-CM

## 2020-01-31 DIAGNOSIS — I11.9 HYPERTENSIVE HEART DISEASE WITHOUT HEART FAILURE: Primary | ICD-10-CM

## 2020-01-31 DIAGNOSIS — Z12.31 SCREENING MAMMOGRAM, ENCOUNTER FOR: ICD-10-CM

## 2020-01-31 PROBLEM — I25.2 HISTORY OF MI (MYOCARDIAL INFARCTION): Status: ACTIVE | Noted: 2019-07-08

## 2020-01-31 PROCEDURE — 3044F PR MOST RECENT HEMOGLOBIN A1C LEVEL <7.0%: ICD-10-PCS | Mod: CPTII,S$GLB,, | Performed by: INTERNAL MEDICINE

## 2020-01-31 PROCEDURE — 99999 PR PBB SHADOW E&M-EST. PATIENT-LVL III: CPT | Mod: PBBFAC,,, | Performed by: INTERNAL MEDICINE

## 2020-01-31 PROCEDURE — 99214 PR OFFICE/OUTPT VISIT, EST, LEVL IV, 30-39 MIN: ICD-10-PCS | Mod: S$GLB,,, | Performed by: INTERNAL MEDICINE

## 2020-01-31 PROCEDURE — 99214 OFFICE O/P EST MOD 30 MIN: CPT | Mod: S$GLB,,, | Performed by: INTERNAL MEDICINE

## 2020-01-31 PROCEDURE — 77067 SCR MAMMO BI INCL CAD: CPT | Mod: 26,,, | Performed by: RADIOLOGY

## 2020-01-31 PROCEDURE — 3079F DIAST BP 80-89 MM HG: CPT | Mod: CPTII,S$GLB,, | Performed by: INTERNAL MEDICINE

## 2020-01-31 PROCEDURE — 3079F PR MOST RECENT DIASTOLIC BLOOD PRESSURE 80-89 MM HG: ICD-10-PCS | Mod: CPTII,S$GLB,, | Performed by: INTERNAL MEDICINE

## 2020-01-31 PROCEDURE — 1101F PR PT FALLS ASSESS DOC 0-1 FALLS W/OUT INJ PAST YR: ICD-10-PCS | Mod: CPTII,S$GLB,, | Performed by: INTERNAL MEDICINE

## 2020-01-31 PROCEDURE — 3044F HG A1C LEVEL LT 7.0%: CPT | Mod: CPTII,S$GLB,, | Performed by: INTERNAL MEDICINE

## 2020-01-31 PROCEDURE — 3077F SYST BP >= 140 MM HG: CPT | Mod: CPTII,S$GLB,, | Performed by: INTERNAL MEDICINE

## 2020-01-31 PROCEDURE — 77067 MAMMO DIGITAL SCREENING BILAT WITH CAD: ICD-10-PCS | Mod: 26,,, | Performed by: RADIOLOGY

## 2020-01-31 PROCEDURE — 1159F MED LIST DOCD IN RCRD: CPT | Mod: S$GLB,,, | Performed by: INTERNAL MEDICINE

## 2020-01-31 PROCEDURE — 77067 SCR MAMMO BI INCL CAD: CPT | Mod: TC,PN

## 2020-01-31 PROCEDURE — 3077F PR MOST RECENT SYSTOLIC BLOOD PRESSURE >= 140 MM HG: ICD-10-PCS | Mod: CPTII,S$GLB,, | Performed by: INTERNAL MEDICINE

## 2020-01-31 PROCEDURE — 1159F PR MEDICATION LIST DOCUMENTED IN MEDICAL RECORD: ICD-10-PCS | Mod: S$GLB,,, | Performed by: INTERNAL MEDICINE

## 2020-01-31 PROCEDURE — 99999 PR PBB SHADOW E&M-EST. PATIENT-LVL III: ICD-10-PCS | Mod: PBBFAC,,, | Performed by: INTERNAL MEDICINE

## 2020-01-31 PROCEDURE — 1101F PT FALLS ASSESS-DOCD LE1/YR: CPT | Mod: CPTII,S$GLB,, | Performed by: INTERNAL MEDICINE

## 2020-01-31 RX ORDER — CLOTRIMAZOLE 1 %
CREAM (GRAM) TOPICAL 2 TIMES DAILY
Qty: 28 G | Refills: 1 | Status: SHIPPED | OUTPATIENT
Start: 2020-01-31 | End: 2020-05-21

## 2020-01-31 RX ORDER — CARVEDILOL 12.5 MG/1
25 TABLET ORAL 2 TIMES DAILY WITH MEALS
Qty: 120 TABLET | Refills: 0
Start: 2020-01-31 | End: 2020-04-29 | Stop reason: SDUPTHER
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
## 2020-01-31 NOTE — TELEPHONE ENCOUNTER
----- Message from Alexis Weilbaecher, RD sent at 1/31/2020  8:23 AM CST -----  Nutrition referral for elevated blood glucose please

## 2020-02-03 DIAGNOSIS — E11.22 TYPE 2 DIABETES MELLITUS WITH STAGE 3 CHRONIC KIDNEY DISEASE, WITH LONG-TERM CURRENT USE OF INSULIN: ICD-10-CM

## 2020-02-03 DIAGNOSIS — N18.30 TYPE 2 DIABETES MELLITUS WITH STAGE 3 CHRONIC KIDNEY DISEASE, WITH LONG-TERM CURRENT USE OF INSULIN: ICD-10-CM

## 2020-02-03 DIAGNOSIS — Z79.4 TYPE 2 DIABETES MELLITUS WITH STAGE 3 CHRONIC KIDNEY DISEASE, WITH LONG-TERM CURRENT USE OF INSULIN: ICD-10-CM

## 2020-02-03 DIAGNOSIS — I25.10 CORONARY ARTERY DISEASE INVOLVING NATIVE CORONARY ARTERY OF NATIVE HEART WITHOUT ANGINA PECTORIS: Primary | ICD-10-CM

## 2020-02-03 NOTE — TELEPHONE ENCOUNTER
----- Message from Monika Krueger sent at 2/3/2020  2:16 PM CST -----  Type:  RX Refill Request    Who Called: babb     Refill or New Rx:refill  RX Name and Strength:glipiZIDE (GLUCOTROL) 10 MG tablet & atorvastatin (LIPITOR) 80 MG tablet    How is the patient currently taking it? (ex. 1XDay):    Is this a 30 day or 90 day RX:    Preferred Pharmacy with phone number:[facility] #[id number] [address] AT [facility] [phone] (Phone)  [phone] (Fax)      Would the patient rather a call back or a response via [facility]? Call back     Best Call Back Number:[phone]    Additional Information:

## 2020-02-03 NOTE — TELEPHONE ENCOUNTER
Pt state she have white spots around her fingernails on her left hand only for a while she is just asking what do you think it maybe

## 2020-02-04 ENCOUNTER — CLINICAL SUPPORT (OUTPATIENT)
Dept: REHABILITATION | Facility: HOSPITAL | Age: 75
End: 2020-02-04
Attending: INTERNAL MEDICINE
Payer: MEDICARE

## 2020-02-04 DIAGNOSIS — R53.81 PHYSICAL DEBILITY: ICD-10-CM

## 2020-02-04 PROCEDURE — 97110 THERAPEUTIC EXERCISES: CPT | Mod: PO

## 2020-02-04 RX ORDER — ATORVASTATIN CALCIUM 80 MG/1
80 TABLET, FILM COATED ORAL DAILY
Qty: 90 TABLET | Refills: 2 | Status: SHIPPED | OUTPATIENT
Start: 2020-02-04 | End: 2020-09-27 | Stop reason: SDUPTHER

## 2020-02-04 RX ORDER — GLIPIZIDE 10 MG/1
10 TABLET ORAL
Qty: 90 TABLET | Refills: 0 | Status: SHIPPED | OUTPATIENT
Start: 2020-02-04 | End: 2020-05-03

## 2020-02-04 NOTE — PROGRESS NOTES
Physical Therapy Daily Treatment Note     Name: Kaylee Urena [facility] Number: [id number]    Therapy Diagnosis:   Encounter Diagnosis   Name Primary?    Physical debility      Physician: Liz Roberts, *    Visit Date: 2/4/2020  Physician Orders: PT Eval and Treat   Medical Diagnosis from Referral:   E11.22,N18.3,Z79.4 (ICD-10-CM) - Type 2 diabetes mellitus with stage 3 chronic kidney disease, with long-term current use of insulin   I25.10 (ICD-10-CM) - Coronary artery disease involving native coronary artery of native heart without angina pectoris   R53.81 (ICD-10-CM) - Physical deconditioning   R26.81 (ICD-10-CM) - Gait instability         Evaluation Date: 1/14/2020  Authorization Period Expiration: 12/30/2020  Plan of Care Expiration: 3/12/2020  Visit # / Visits authorized: 2/ 6     Time In: 2:00  Time Out: 3:00  Total Billable Time: 30 minutes     Precautions: Standard, Diabetes and CAD      Subjective     Pt reports: that she is feeling fine today.  She was compliant with home exercise program.  Response to previous treatment: Pt reported that performing heel slides wears her out.  Functional change: none    Pain: 2/10  Location: right knee      Objective   Lower Extremity Strength  Right LE   Left LE     Knee extension: 4/5 Knee extension: 4/5   Knee flexion: 3/5 Knee flexion: 3/5   Hip flexion: 3/5 Hip flexion: 3/5   Hip extension:  3+/5 Hip extension: 3/5   Hip abduction: 2/5 Hip abduction: 2+/5   Hip adduction: 4-/5 Hip adduction 4/5   Ankle dorsiflexion: nt Ankle dorsiflexion: nt   Ankle plantarflexion: nt Ankle plantarflexion: nt          Kaylee received therapeutic exercises to develop strength and endurance for  minutes including:  Supine B hip adduction 10 x 5 with a ball   Supine B hip abduction 10 x 5 with an orange TB   Gluteal sets 10 x 5   Seated hip flexion 10 x 5   Seated knee extension 10 x 5  Heel slides 10 x 3 on R & L  Seated heel raises 10 x 5  Manually resisted U Hamstring curls  10 x 2 on R & L      Not performed  Standing rows 10 x 2  Kaylee participated in neuromuscular re-education activities to improve: Balance for 00  minutes. The following activities were included:  Pt ambulated around the clinic, 300 ft, with SBA and no assistive device one time half way through her exercises and one time following her exercises    Pt received an Ice pack to her R knee post Tx.  Home Exercises Provided and Patient Education Provided     Education provided:   - yes    Written Home Exercises Provided: yes.  Exercises were reviewed and Kaylee was able to demonstrate them prior to the end of the session.  Kaylee demonstrated good  understanding of the education provided.     See EMR under Patient Instructions for exercises provided 1/23/2020.    Assessment     Pt was able to tolerate Tx reported fatigue with exercises.  Kaylee is progressing well towards her goals.   Pt prognosis is Good.     Pt will continue to benefit from skilled outpatient physical therapy to address the deficits listed in the problem list box on initial evaluation, provide pt/family education and to maximize pt's level of independence in the home and community environment.     Pt's spiritual, cultural and educational needs considered and pt agreeable to plan of care and goals.     Anticipated barriers to physical therapy: none    Goals:   Short Term Goals: 2 weeks   Pt will be independent in a HEP to address their functional deficits related to entrance into the Cardiac Rehabilitation program     Long Term Goals: 4 weeks   Improve B LE MMT by 1/2 grade to show improvement in strength   Pt able to tolerate walking 300 ft over a level surface without her cane  Pt to have met criteria to enter a Cardiac Rehabilitation program  Plan     Progress Physical Therapy treatments to have pat ready to enter a cardiac rehabilitation program.    Patrick Covarrubias, PT

## 2020-02-05 DIAGNOSIS — I25.810 CORONARY ARTERY DISEASE INVOLVING CORONARY BYPASS GRAFT OF NATIVE HEART WITHOUT ANGINA PECTORIS: ICD-10-CM

## 2020-02-05 RX ORDER — NITROGLYCERIN 0.4 MG/1
0.4 TABLET SUBLINGUAL EVERY 5 MIN PRN
Qty: 30 TABLET | Refills: 1 | Status: SHIPPED | OUTPATIENT
Start: 2020-02-05 | End: 2020-07-09 | Stop reason: SDUPTHER

## 2020-02-05 NOTE — TELEPHONE ENCOUNTER
----- Message from Susan Mcconnell sent at 2/5/2020 11:20 AM CST -----  Contact: [phone]  Type: Rx    Name of medication(s): nitroGLYCERIN (NITROSTAT) 0.4 MG SL tablet    Pharmacy Name, Phone, & Location:[email or web address] #[id number] [address] AT [facility]   [phone] (Phone)  [phone] (Fax)    Comments:please advise, thanks

## 2020-02-06 ENCOUNTER — CLINICAL SUPPORT (OUTPATIENT)
Dept: REHABILITATION | Facility: HOSPITAL | Age: 75
End: 2020-02-06
Attending: INTERNAL MEDICINE
Payer: MEDICARE

## 2020-02-06 DIAGNOSIS — R53.81 PHYSICAL DEBILITY: ICD-10-CM

## 2020-02-06 PROCEDURE — 97110 THERAPEUTIC EXERCISES: CPT | Mod: PO,CQ

## 2020-02-06 PROCEDURE — 97112 NEUROMUSCULAR REEDUCATION: CPT | Mod: PO,CQ

## 2020-02-06 NOTE — PROGRESS NOTES
Physical Therapy Daily Treatment Note     Name: Kaylee Urena [facility] Number: [id number]    Therapy Diagnosis:   No diagnosis found.  Physician: Liz Roberts, *    Visit Date: 2/6/2020  Physician Orders: PT Eval and Treat   Medical Diagnosis from Referral:   E11.22,N18.3,Z79.4 (ICD-10-CM) - Type 2 diabetes mellitus with stage 3 chronic kidney disease, with long-term current use of insulin   I25.10 (ICD-10-CM) - Coronary artery disease involving native coronary artery of native heart without angina pectoris   R53.81 (ICD-10-CM) - Physical deconditioning   R26.81 (ICD-10-CM) - Gait instability         Evaluation Date: 1/14/2020  Authorization Period Expiration: 12/30/2020  Plan of Care Expiration: 3/12/2020  Visit # / Visits authorized: 3/ 6     Time In: 1:10  Time Out: 2:05  Total Billable Time: 55 minutes     Precautions: Standard, Diabetes and CAD      Subjective     Pt reports: that she is feeling fine today.  She was compliant with home exercise program.  Response to previous treatment: Pt reported that she felt ok after her last tx visit.  Functional change: none    Pain: 2/10  Location: right knee      Objective     Kaylee received therapeutic exercises to develop strength and endurance for 30  minutes including:    Supine B hip adduction 10 x 5 with a ball   Supine B hip abduction 10 x 5 with an orange TB   Bridges with isometric hip abd 2 x 10   Ambulated ~300 ft with no AD, Supervision   Seated hip flexion 10 x 5   Seated knee extension 10 x 5  Seated HS curls with orange TB 10 x 2 R & L  Ambulated ~300 ft with no AD, Supervision  Sit to stands form elevated hi/low table 10 x 1   Standing heel raises 15 x 1      Not performed  Standing rows 10 x 2      Kaylee participated in neuromuscular re-education activities to improve: Balance for 25  minutes. The following activities were included:    Pt ambulated around the clinic, 300 ft, with SBA and no assistive device three times throughout today's tx  "visit     -Step overs a foam roller, unilateral UE support, CGA: 10 x 1 each LE    Foam:  Static standing, no UE support, eyes open, CGA: 30 sec x 1  Static standing, no UE support, eyes closed, CGA/min A: 30 sec x 2  Static standing, no UE support, eyes open with horizontal head turns, CGA: 30 sec x 1    Step ups on 4" step, no UE support, CGA: 10 x 1         Pt received an Ice pack to her R knee post Tx.  Home Exercises Provided and Patient Education Provided     Education provided:   - yes    Written Home Exercises Provided: yes.  Exercises were reviewed and Kaylee was able to demonstrate them prior to the end of the session.  Kaylee demonstrated good  understanding of the education provided.     See EMR under Patient Instructions for exercises provided 1/23/2020.    Assessment   Pt was able to tolerate tx progression well as pt demonstrated min fatigue and no shortness of breath with therapeutic exercises or balance activities. Will progress as tolerated.     Kaylee is progressing well towards her goals.   Pt prognosis is Good.     Pt will continue to benefit from skilled outpatient physical therapy to address the deficits listed in the problem list box on initial evaluation, provide pt/family education and to maximize pt's level of independence in the home and community environment.     Pt's spiritual, cultural and educational needs considered and pt agreeable to plan of care and goals.     Anticipated barriers to physical therapy: none    Goals:   Short Term Goals: 2 weeks   Pt will be independent in a HEP to address their functional deficits related to entrance into the Cardiac Rehabilitation program     Long Term Goals: 4 weeks   Improve B LE MMT by 1/2 grade to show improvement in strength   Pt able to tolerate walking 300 ft over a level surface without her cane  Pt to have met criteria to enter a Cardiac Rehabilitation program  Plan     Progress Physical Therapy treatments to have pat ready to enter a " cardiac rehabilitation program.    Erickson Hinton, PTA

## 2020-02-10 ENCOUNTER — TELEPHONE (OUTPATIENT)
Dept: PHARMACY | Facility: CLINIC | Age: 75
End: 2020-02-10

## 2020-02-10 DIAGNOSIS — I12.9 TYPE 2 DIABETES MELLITUS WITH STAGE 3 CHRONIC KIDNEY DISEASE AND HYPERTENSION: Primary | ICD-10-CM

## 2020-02-10 DIAGNOSIS — E11.22 TYPE 2 DIABETES MELLITUS WITH STAGE 3 CHRONIC KIDNEY DISEASE AND HYPERTENSION: Primary | ICD-10-CM

## 2020-02-10 DIAGNOSIS — N18.30 TYPE 2 DIABETES MELLITUS WITH STAGE 3 CHRONIC KIDNEY DISEASE AND HYPERTENSION: Primary | ICD-10-CM

## 2020-02-10 RX ORDER — INSULIN GLARGINE 100 [IU]/ML
15 INJECTION, SOLUTION SUBCUTANEOUS DAILY
Qty: 3 BOX | Refills: 11 | Status: SHIPPED | OUTPATIENT
Start: 2020-02-10 | End: 2021-01-04

## 2020-02-10 NOTE — TELEPHONE ENCOUNTER
Provided patient with a free 1 month trial of Basaglar.  Also, an application was mailed to [facility] pending approval.

## 2020-02-12 ENCOUNTER — CLINICAL SUPPORT (OUTPATIENT)
Dept: REHABILITATION | Facility: HOSPITAL | Age: 75
End: 2020-02-12
Attending: INTERNAL MEDICINE
Payer: MEDICARE

## 2020-02-12 DIAGNOSIS — R53.81 PHYSICAL DEBILITY: ICD-10-CM

## 2020-02-12 PROCEDURE — 97110 THERAPEUTIC EXERCISES: CPT | Mod: PO,CQ

## 2020-02-12 NOTE — PROGRESS NOTES
Physical Therapy Daily Treatment Note     Name: Kaylee Urena [facility]  Clinic Number: [id number]    Therapy Diagnosis:   No diagnosis found.  Physician: Liz Roberts, *    Visit Date: 2/12/2020  Physician Orders: PT Eval and Treat   Medical Diagnosis from Referral:   E11.22,N18.3,Z79.4 (ICD-10-CM) - Type 2 diabetes mellitus with stage 3 chronic kidney disease, with long-term current use of insulin   I25.10 (ICD-10-CM) - Coronary artery disease involving native coronary artery of native heart without angina pectoris   R53.81 (ICD-10-CM) - Physical deconditioning   R26.81 (ICD-10-CM) - Gait instability         Evaluation Date: 1/14/2020  Authorization Period Expiration: 12/30/2020  Plan of Care Expiration: 3/12/2020  Visit # / Visits authorized: 3/ 6     Time In: 2:10  Time Out: 3:00  Total Billable Time: 30 minutes     Precautions: Standard, Diabetes and CAD      Subjective     Pt reports: that she has been walking more.  She was compliant with home exercise program.  Response to previous treatment: Pt reported that she felt ok after her last tx visit.  Functional change: none    Pain: 0/10  Location: n/a     Objective     Kaylee received therapeutic exercises to develop strength and endurance for 25  minutes including:    Supine B hip adduction 10 x 5 with a ball   Supine B hip abduction 10 x 5 with an orange TB   Bridges with isometric hip abd 2 x 10   Ambulated ~300 ft with no AD, Supervision   Seated hip flexion 10 x 5   Gastroc stretch on slant 2x30 sec  Standing toe raises 1x10 with 3 sec holds      Not performed:  Seated knee extension 10 x 5  Seated HS curls with orange TB 10 x 2 R & L  Ambulated ~300 ft with no AD, Supervision  Sit to stands form elevated hi/low table 10 x 1   Standing heel raises 15 x 1  Standing rows 10 x 2      Kaylee participated in neuromuscular re-education activities to improve: Balance for 15  minutes. The following activities were included:    -Step overs a 2 inch step, unilateral  UE support, CGA: 10 x 1 B    Foam:  Static standing, no UE support, eyes closed, CGA/min A: 30 sec x 2  Static standing, no UE support, eyes open with horizontal head turns, CGA: 30 sec x 1    Step ups on foam, no UE support, CGA: 10 x 1 B        Pt received an Ice pack to her R knee post Tx.  Home Exercises Provided and Patient Education Provided     Education provided:   - yes    Written Home Exercises Provided: yes.  Exercises were reviewed and Kaylee was able to demonstrate them prior to the end of the session.  Kaylee demonstrated good  understanding of the education provided.     See EMR under Patient Instructions for exercises provided 1/23/2020.    Assessment   Pt was able to tolerate tx well. Pt demonstrated min fatigue and no shortness of breath with therapeutic exercises or balance activities. Will progress as tolerated.     Kaylee is progressing well towards her goals.   Pt prognosis is Good.     Pt will continue to benefit from skilled outpatient physical therapy to address the deficits listed in the problem list box on initial evaluation, provide pt/family education and to maximize pt's level of independence in the home and community environment.     Pt's spiritual, cultural and educational needs considered and pt agreeable to plan of care and goals.     Anticipated barriers to physical therapy: none    Goals:   Short Term Goals: 2 weeks   Pt will be independent in a HEP to address their functional deficits related to entrance into the Cardiac Rehabilitation program     Long Term Goals: 4 weeks   Improve B LE MMT by 1/2 grade to show improvement in strength   Pt able to tolerate walking 300 ft over a level surface without her cane  Pt to have met criteria to enter a Cardiac Rehabilitation program  Plan     Progress Physical Therapy treatments to have pat ready to enter a cardiac rehabilitation program.    Erickson Hinton, PTA

## 2020-02-13 ENCOUNTER — NUTRITION (OUTPATIENT)
Dept: NUTRITION | Facility: CLINIC | Age: 75
End: 2020-02-13
Payer: MEDICARE

## 2020-02-13 ENCOUNTER — TELEPHONE (OUTPATIENT)
Dept: PHARMACY | Facility: CLINIC | Age: 75
End: 2020-02-13

## 2020-02-13 DIAGNOSIS — E11.22 TYPE 2 DIABETES MELLITUS WITH STAGE 3 CHRONIC KIDNEY DISEASE AND HYPERTENSION: Primary | ICD-10-CM

## 2020-02-13 DIAGNOSIS — N18.30 TYPE 2 DIABETES MELLITUS WITH STAGE 3 CHRONIC KIDNEY DISEASE AND HYPERTENSION: Primary | ICD-10-CM

## 2020-02-13 DIAGNOSIS — I12.9 TYPE 2 DIABETES MELLITUS WITH STAGE 3 CHRONIC KIDNEY DISEASE AND HYPERTENSION: Primary | ICD-10-CM

## 2020-02-13 PROCEDURE — 97802 PR MED NUTR THER, 1ST, INDIV, EA 15 MIN: ICD-10-PCS | Mod: S$GLB,,, | Performed by: NUTRITIONIST

## 2020-02-13 PROCEDURE — 97802 MEDICAL NUTRITION INDIV IN: CPT | Mod: S$GLB,,, | Performed by: NUTRITIONIST

## 2020-02-13 NOTE — PROGRESS NOTES
"Nutrition Assessment for Initial Medical Nutrition Therapy    Referring professional: Liz Roberts    Reason for MNT visit: Pt in for education and nutrition counseling regarding elevated blood glucose/type 2 Diabetes      ASSESSMENT    Age: 75 y.o.  Wt: 227 lb  Wt Readings from Last 1 Encounters:   01/31/20 105.1 kg (231 lb 11.3 oz)     Ht: 5'2"  Ht Readings from Last 1 Encounters:   01/31/20 5' 2" (1.575 m)     BMI: 42.3  BMI Readings from Last 1 Encounters:   01/31/20 42.38 kg/m²       Clinical signs/symptoms: Patient stated she has low Energy and a hard time remembering current situations. Her , son and son's fiance were present    Medical History:   Past Medical History:   Diagnosis Date    Acid reflux     Age-related osteoporosis without current pathological fracture 1/11/2019    Cataract     CKD (chronic kidney disease) stage 3, GFR 30-59 ml/min     Dry mouth     History of TIA (transient ischemic attack) 12/11/2018    Hyperlipidemia 12/2/2014    Hypertensive heart disease with diastolic heart failure 11/28/2012    Morbid obesity with body mass index (BMI) of 40.0 or higher 5/9/2016    KAMRAN (obstructive sleep apnea)     KAMRAN on CPAP 6/28/2016    Osteoporosis without current pathological fracture 11/11/2018    Physical deconditioning 11/5/2018    Status post total left knee replacement 5/1/2017 5/16/2017    Type 2 diabetes mellitus with stage 3 chronic kidney disease, without long-term current use of insulin 5/16/2019     Problem List           Resolved    Osteoarthritis of knee         Essential hypertension         Hyperlipidemia         Adrenal cortical steroids causing adverse effect in therapeutic use         Morbid obesity with body mass index (BMI) of 40.0 or higher         KAMRAN on CPAP         CKD (chronic kidney disease) stage 3, GFR 30-59 ml/min         Physical deconditioning         Osteoporosis without current pathological fracture         History of TIA (transient ischemic " attack)         Age-related osteoporosis without current pathological fracture         Vitamin D deficiency         Type 2 diabetes mellitus with stage 3 chronic kidney disease and hypertension         History of MI (myocardial infarction)         CAD (coronary artery disease)         S/P CABG (coronary artery bypass graft)         Acute blood loss anemia         Coronary artery disease involving coronary bypass graft of native heart without angina pectoris         Shoulder pain         Chest pain         Physical debility               Medications:   Current Outpatient Medications:     alcohol swabs (ALCOHOL PADS) PadM, Apply 1 each topically as needed., Disp: 11 each, Rfl: 11    alendronate (FOSAMAX) 70 MG tablet, Take 1 tablet (70 mg total) by mouth every 7 days. Take with a full glass of water & remain upright for at least 30 minutes, Disp: 12 tablet, Rfl: 3    amLODIPine (NORVASC) 10 MG tablet, Take 1 tablet (10 mg total) by mouth once daily., Disp: 30 tablet, Rfl: 11    aspirin 81 MG Chew, Take 1 tablet (81 mg total) by mouth once daily., Disp: , Rfl: 0    atorvastatin (LIPITOR) 80 MG tablet, Take 1 tablet (80 mg total) by mouth once daily., Disp: 90 tablet, Rfl: 2    blood sugar diagnostic Strp, 1 strip by Misc.(Non-Drug; Combo Route) route once daily., Disp: 100 strip, Rfl: 3    carvediloL (COREG) 12.5 MG tablet, Take 2 tablets (25 mg total) by mouth 2 (two) times daily with meals., Disp: 120 tablet, Rfl: 0    clopidogrel (PLAVIX) 75 mg tablet, Take 1 tablet (75 mg total) by mouth once daily., Disp: 30 tablet, Rfl: 10    clotrimazole (LOTRIMIN) 1 % cream, Apply topically 2 (two) times daily., Disp: 28 g, Rfl: 1    diclofenac sodium (VOLTAREN) 1 % Gel, Apply 2 g topically 4 (four) times daily., Disp: 1 Tube, Rfl: 2    dulaglutide (TRULICITY) 1.5 mg/0.5 mL PnIj, Inject 1.5 mg into the skin every 7 days., Disp: , Rfl:     ferrous sulfate (FEOSOL) 325 mg (65 mg iron) Tab tablet, Take 1 tablet (325 mg  "total) by mouth once daily., Disp: 90 tablet, Rfl: 0    flash glucose scanning reader Misc, Use as directed., Disp: 1 each, Rfl: 0    flash glucose sensor Kit, 1 Device by Misc.(Non-Drug; Combo Route) route every 14 (fourteen) days., Disp: 2 kit, Rfl: 5    glipiZIDE (GLUCOTROL) 10 MG tablet, Take 1 tablet (10 mg total) by mouth daily with breakfast., Disp: 90 tablet, Rfl: 0    insulin (BASAGLAR KWIKPEN U-100 INSULIN) glargine 100 units/mL (3mL) SubQ pen, Inject 15 Units into the skin once daily., Disp: 3 Box, Rfl: 11    lancets Misc, 1 lancet by Misc.(Non-Drug; Combo Route) route once daily., Disp: 100 each, Rfl: 3    multivitamin (ONE DAILY MULTIVITAMIN) per tablet, Take 1 tablet by mouth once daily., Disp: , Rfl:     nitroGLYCERIN (NITROSTAT) 0.4 MG SL tablet, Place 1 tablet (0.4 mg total) under the tongue every 5 (five) minutes as needed for Chest pain., Disp: 30 tablet, Rfl: 1    pen needle, diabetic 31 gauge x 5/16" Ndle, Use every evening., Disp: 100 each, Rfl: 11    tiZANidine (ZANAFLEX) 4 MG tablet, Take 1 tablet (4 mg total) by mouth 2 (two) times daily as needed (Muscle spasm.)., Disp: 30 tablet, Rfl: 1    valsartan (DIOVAN) 320 MG tablet, Take 1 tablet (320 mg total) by mouth once daily., Disp: 30 tablet, Rfl: 5    Supplements: MVI one a day    Food/medication interactions:   Fosamax: adequate Ca and Vitamin D essential in divided doses  Norvasc (ANTIHYPERTENSIVE) - Avoid natural licorice  Lipitor (ANTIHYPERLIPIDEMIC) - Decrease fat & cholesterol (decrease calories if needed); Caution w/ grapefruit + related citrus  Coreg and Diovan: (ANTIHYPERTENSIVE): Decrease Sodium, decrease calories may be recommended. Avoid natural licorice  Trulicity: Avoid alcohol  Glipizide (ORAL HYPOGLYCEMIC) - Adjunct to prescribed diabetic diet & exercise; Caution w/ high dose (>3g) nicotinic acid (will increase glucose)    Food allergies  intolerances: NKFA    Labs:  Reviewed and noted abnormal labs  Hemoglobin A1C "   Date Value Ref Range Status   12/11/2019 6.7 (H) 4.0 - 5.6 % Final     Comment:     ADA Screening Guidelines:  5.7-6.4%  Consistent with prediabetes  >or=6.5%  Consistent with diabetes  High levels of fetal hemoglobin interfere with the HbA1C  assay. Heterozygous hemoglobin variants (HbS, HgC, etc)do  not significantly interfere with this assay.   However, presence of multiple variants may affect accuracy.     10/26/2019 6.6 (H) 4.0 - 5.6 % Final     Comment:     ADA Screening Guidelines:  5.7-6.4%  Consistent with prediabetes  >or=6.5%  Consistent with diabetes  High levels of fetal hemoglobin interfere with the HbA1C  assay. Heterozygous hemoglobin variants (HbS, HgC, etc)do  not significantly interfere with this assay.   However, presence of multiple variants may affect accuracy.     08/07/2019 7.5 (H) 4.0 - 5.6 % Final     Comment:     ADA Screening Guidelines:  5.7-6.4%  Consistent with prediabetes  >or=6.5%  Consistent with diabetes  High levels of fetal hemoglobin interfere with the HbA1C  assay. Heterozygous hemoglobin variants (HbS, HgC, etc)do  not significantly interfere with this assay.   However, presence of multiple variants may affect accuracy.       Cholesterol   Date Value Ref Range Status   12/11/2019 103 (L) 120 - 199 mg/dL Final     Comment:     The National Cholesterol Education Program (NCEP) has set the  following guidelines (reference ranges) for Cholesterol:  Optimal.....................<200 mg/dL  Borderline High.............200-239 mg/dL  High........................> or = 240 mg/dL     07/09/2019 134 120 - 199 mg/dL Final     Comment:     The National Cholesterol Education Program (NCEP) has set the  following guidelines (reference ranges) for Cholesterol:  Optimal.....................<200 mg/dL  Borderline High.............200-239 mg/dL  High........................> or = 240 mg/dL     04/11/2019 145 120 - 199 mg/dL Final     Comment:     The National Cholesterol Education Program  (NCEP) has set the  following guidelines (reference ranges) for Cholesterol:  Optimal.....................<200 mg/dL  Borderline High.............200-239 mg/dL  High........................> or = 240 mg/dL       Triglycerides   Date Value Ref Range Status   12/11/2019 60 30 - 150 mg/dL Final     Comment:     The National Cholesterol Education Program (NCEP) has set the  following guidelines (reference values) for triglycerides:  Normal......................<150 mg/dL  Borderline High.............150-199 mg/dL  High........................200-499 mg/dL     07/09/2019 101 30 - 150 mg/dL Final     Comment:     The National Cholesterol Education Program (NCEP) has set the  following guidelines (reference values) for triglycerides:  Normal......................<150 mg/dL  Borderline High.............150-199 mg/dL  High........................200-499 mg/dL     04/11/2019 64 30 - 150 mg/dL Final     Comment:     The National Cholesterol Education Program (NCEP) has set the  following guidelines (reference values) for triglycerides:  Normal......................<150 mg/dL  Borderline High.............150-199 mg/dL  High........................200-499 mg/dL       HDL   Date Value Ref Range Status   12/11/2019 39 (L) 40 - 75 mg/dL Final     Comment:     The National Cholesterol Education Program (NCEP) has set the  following guidelines (reference values) for HDL Cholesterol:  Low...............<40 mg/dL  Optimal...........>60 mg/dL     07/09/2019 43 40 - 75 mg/dL Final     Comment:     The National Cholesterol Education Program (NCEP) has set the  following guidelines (reference values) for HDL Cholesterol:  Low...............<40 mg/dL  Optimal...........>60 mg/dL     04/11/2019 49 40 - 75 mg/dL Final     Comment:     The National Cholesterol Education Program (NCEP) has set the  following guidelines (reference values) for HDL Cholesterol:  Low...............<40 mg/dL  Optimal...........>60 mg/dL       LDL Cholesterol   Date  Value Ref Range Status   12/11/2019 52.0 (L) 63.0 - 159.0 mg/dL Final     Comment:     The National Cholesterol Education Program (NCEP) has set the  following guidelines (reference values) for LDL Cholesterol:  Optimal.......................<130 mg/dL  Borderline High...............130-159 mg/dL  High..........................160-189 mg/dL  Very High.....................>190 mg/dL     07/09/2019 70.8 63.0 - 159.0 mg/dL Final     Comment:     The National Cholesterol Education Program (NCEP) has set the  following guidelines (reference values) for LDL Cholesterol:  Optimal.......................<130 mg/dL  Borderline High...............130-159 mg/dL  High..........................160-189 mg/dL  Very High.....................>190 mg/dL     04/11/2019 83.2 63.0 - 159.0 mg/dL Final     Comment:     The National Cholesterol Education Program (NCEP) has set the  following guidelines (reference values) for LDL Cholesterol:  Optimal.......................<130 mg/dL  Borderline High...............130-159 mg/dL  High..........................160-189 mg/dL  Very High.....................>190 mg/dL         Typical day recall: reviewed and noted during consult    Beverages: water: 4 + glasses per day    Dining out: Regular, 3-4 times per week    Alcohol: nonsmoker; drinks alcohol 3 times in a month    Lifestyle Influences  Support System: Her  and family    Meal preparation/shopping:     Current Activity Level: physical therapy twice a week    Patient motivation, anticipated barriers, expected compliance: Patient is motivated and has verbalized understanding and intent to comply    DIAGNOSIS   Problem: Obese  Etiology: related to eating too many calories  Signs/Symptoms: AEB BMI of 42.3 and 24 hour recall    INTERVENTION  Nutrition prescription: estimated energy requirements:   1800 calories   grams protein  ~120 grams carbohydrates  100 grams heart healthy fat  Fluid: 100-115 oz + sweat  loss      Recommendations & Goals:  Patient goals and recommendations are tailored to the specific patient's needs, readiness to change, lifestyle, culture, skills, resources, & abilities. Strategies to help achieve these nutrition-related goals were discussed which can include but are not limited to SMART goal setting & mindful eating.     Aim for a minimum of 7 hours sleep   Exercise 60 minutes most days  Eat breakfast within 1-2 hours of waking up  Try not to skip any meals or snacks, not going more than 3-4 hours without eating.   At each meal and snack, try to include a source of fiber + lean protein + healthy fat.       Written Materials Provided  These resources are intended to assist the patient in making it easier to choose recommended options when eating out & to identify better-for-you brands at the grocery store:     Meal Planning Guide with recommendations discussed along with portion sizes and a customized meal plan    Eat Fit breanna card as a reminder to download the breanna to ones smart phone which provides: current Eat Fit partners, approved menu options, food labels for carb counting, & recipes    Fast Food Lite Guide   Eat Fit Grocery Product list    RD contact information- for patient to contact regarding any questions, needs, and/or concerns that may arise     MONITORING & EVALUATION    Communicated with healthcare provider    Documented plan for referral to appropriate agency/healthcare provider as needed    Comprehension: fair     Motivation to change: moderate    Follow-up: Within 3 months    Counseling time: 2 Hours

## 2020-02-13 NOTE — TELEPHONE ENCOUNTER
Patient approved with [employer]until 12/31/2020.  Medication will be shipped to patients home within 7-10 business days.

## 2020-02-20 ENCOUNTER — PATIENT OUTREACH (OUTPATIENT)
Dept: OTHER | Facility: OTHER | Age: 75
End: 2020-02-20

## 2020-02-21 NOTE — PLAN OF CARE
Problem: Adult Inpatient Plan of Care  Goal: Plan of Care Review  Outcome: Ongoing (interventions implemented as appropriate)  Plan of care discussed with patient.  Patient ambulating with 1-2 assist, fall precautions in place.Continuing to encourage sternal precautions, IS, and ambulation. Pt is currently noncompliant with sternal precautions, education re-enforced and encouraged during ambulation and shifting positions. Patient has no complaints of pain. Discussed medications and care. Still receiving Vanco q24h and Zosyn q8h. Being diuresed with 20 mg IVP lasix BID. NS d/c'ed earlier today. Patient has no questions at this time. Will continue to monitor.              Rogelio Nails(PA)

## 2020-02-24 NOTE — PROGRESS NOTES
Digital Medicine: Clinician Follow-Up    Patient returned missed phone call for hypertension and diabetes follow-up. Mrs. Page reports that she has been on vacation the past weeks and has not taken readings due to this. She plans to start back to measuring after the [place] holiday. She confirmed increasing Coreg to 25 mg twice daily as instructed by her PCP Gus for her blood pressure.     The history is provided by the patient. No  was used.     Follow Up  Follow-up reason(s): reading review      Readings are missing.       INTERVENTION(S)  encouragement/support    PLAN  patient verbalizes understanding and continue monitoring    Diabetes:  No readings in 3 weeks. Prior to lack of readings, BG was well-controlled.  Last A1c from December 2019 of 6.7% meets goal of <7%.    Continue current regimen.  Advised patient to resume measuring for monitoring.   New A1c due in June.     Hypertension:  No readings in  3 weeks. BP avg 147/87.  Recent med change made by PCP at end of January.  Continue to monitor for improvements. If no significant improvements, consider starting low dose diuretic for better BP control.    Continue current regimen.  Advised patient to resume measuring for monitoring.     Follow-up in 4 weeks.       There are no preventive care reminders to display for this patient.    Last 5 Patient Entered Readings                                      Current 30 Day Average: 147/87     Recent Readings 2/16/2020 2/1/2020 1/28/2020 1/25/2020 1/22/2020    SBP (mmHg) 143 146 150 147 138    DBP (mmHg) 81 84 96 86 79    Pulse 92 75 87 81 91        Last 6 Patient Entered Readings                                          Most Recent A1c: 6.7% on 12/11/2019  (Goal: 8%)     Recent Readings 2/1/2020 1/28/2020 1/25/2020 1/22/2020 1/20/2020    Blood Glucose (mg/dL) 148 100 98 101 169           Hypertension Medications             amLODIPine (NORVASC) 10 MG tablet Take 1 tablet (10 mg total) by  mouth once daily.    carvediloL (COREG) 12.5 MG tablet Take 2 tablets (25 mg total) by mouth 2 (two) times daily with meals.    nitroGLYCERIN (NITROSTAT) 0.4 MG SL tablet Place 1 tablet (0.4 mg total) under the tongue every 5 (five) minutes as needed for Chest pain.    valsartan (DIOVAN) 320 MG tablet Take 1 tablet (320 mg total) by mouth once daily.        Diabetes Medications             dulaglutide (TRULICITY) 1.5 mg/0.5 mL PnIj Inject 1.5 mg into the skin every 7 days.    glipiZIDE (GLUCOTROL) 10 MG tablet Take 1 tablet (10 mg total) by mouth daily with breakfast.    insulin (BASAGLAR KWIKPEN U-100 INSULIN) glargine 100 units/mL (3mL) SubQ pen Inject 15 Units into the skin once daily.               Screenings

## 2020-02-26 ENCOUNTER — PATIENT OUTREACH (OUTPATIENT)
Dept: ADMINISTRATIVE | Facility: OTHER | Age: 75
End: 2020-02-26

## 2020-02-26 NOTE — PROGRESS NOTES
Chart reviewed.   Immunizations: Triggered Imm Registry     Orders placed: n/a  Upcoming appts to satisfy SYED topics: A1c 5/20/20

## 2020-02-27 ENCOUNTER — PATIENT OUTREACH (OUTPATIENT)
Dept: OTHER | Facility: OTHER | Age: 75
End: 2020-02-27

## 2020-02-27 ENCOUNTER — OFFICE VISIT (OUTPATIENT)
Dept: PODIATRY | Facility: CLINIC | Age: 75
End: 2020-02-27
Payer: MEDICARE

## 2020-02-27 VITALS
BODY MASS INDEX: 42.63 KG/M2 | HEIGHT: 62 IN | DIASTOLIC BLOOD PRESSURE: 64 MMHG | WEIGHT: 231.69 LBS | SYSTOLIC BLOOD PRESSURE: 122 MMHG | HEART RATE: 89 BPM

## 2020-02-27 DIAGNOSIS — B35.1 DERMATOPHYTOSIS OF NAIL: ICD-10-CM

## 2020-02-27 DIAGNOSIS — L84 CORN OR CALLUS: ICD-10-CM

## 2020-02-27 DIAGNOSIS — E11.49 TYPE II DIABETES MELLITUS WITH NEUROLOGICAL MANIFESTATIONS: Primary | ICD-10-CM

## 2020-02-27 PROCEDURE — 11056 ROUTINE FOOT CARE: ICD-10-PCS | Mod: Q9,S$GLB,, | Performed by: PODIATRIST

## 2020-02-27 PROCEDURE — 99999 PR PBB SHADOW E&M-EST. PATIENT-LVL III: ICD-10-PCS | Mod: PBBFAC,,, | Performed by: PODIATRIST

## 2020-02-27 PROCEDURE — 11721 DEBRIDE NAIL 6 OR MORE: CPT | Mod: Q9,59,S$GLB, | Performed by: PODIATRIST

## 2020-02-27 PROCEDURE — 11721 ROUTINE FOOT CARE: ICD-10-PCS | Mod: Q9,59,S$GLB, | Performed by: PODIATRIST

## 2020-02-27 PROCEDURE — 99499 NO LOS: ICD-10-PCS | Mod: S$GLB,,, | Performed by: PODIATRIST

## 2020-02-27 PROCEDURE — 11056 PARNG/CUTG B9 HYPRKR LES 2-4: CPT | Mod: Q9,S$GLB,, | Performed by: PODIATRIST

## 2020-02-27 PROCEDURE — 99999 PR PBB SHADOW E&M-EST. PATIENT-LVL III: CPT | Mod: PBBFAC,,, | Performed by: PODIATRIST

## 2020-02-27 PROCEDURE — 99499 UNLISTED E&M SERVICE: CPT | Mod: S$GLB,,, | Performed by: PODIATRIST

## 2020-02-27 NOTE — PROGRESS NOTES
Subjective:       Patient ID: Kaylee Romero is a 75 y.o. female.    Chief Complaint: Diabetes Mellitus ( SagrarioginiPaty 01/31/2020 pt stated)        Kaylee Romero is a 75 y.o. female    has a past medical history of Acid reflux, Age-related osteoporosis without current pathological fracture, Cataract, CKD (chronic kidney disease) stage 3, GFR 30-59 ml/min, Dry mouth, History of TIA (transient ischemic attack), Hyperlipidemia, Hypertensive heart disease with diastolic heart failure, Morbid obesity with body mass index (BMI) of 40.0 or higher, KAMRAN (obstructive sleep apnea), KAMRAN on CPAP, Osteoporosis without current pathological fracture, Physical deconditioning, Status post total left knee replacement 5/1/2017, and Type 2 diabetes mellitus with stage 3 chronic kidney disease, without long-term current use of insulin.     Pt presents to clinic for diabetic foot exam. Pt relates she has fungal toe nails and dry skin on the feet.   She uses [employer] for diabetics lotion.   No other pedal complaints at this time.         PCP:  Liz Roberts MD  Last visit:    Diabetes Mellitus ( Paty Beal 01/31/2020 pt stated)               Past Medical History:   Diagnosis Date    Acid reflux     Age-related osteoporosis without current pathological fracture 1/11/2019    Cataract     CKD (chronic kidney disease) stage 3, GFR 30-59 ml/min     Dry mouth     History of TIA (transient ischemic attack) 12/11/2018    Hyperlipidemia 12/2/2014    Hypertensive heart disease with diastolic heart failure 11/28/2012    Morbid obesity with body mass index (BMI) of 40.0 or higher 5/9/2016    KAMRAN (obstructive sleep apnea)     KAMRAN on CPAP 6/28/2016    Osteoporosis without current pathological fracture 11/11/2018    Physical deconditioning 11/5/2018    Status post total left knee replacement 5/1/2017 5/16/2017    Type 2 diabetes mellitus with stage 3 chronic kidney disease, without long-term current use of  insulin 5/16/2019             Current Outpatient Medications on File Prior to Visit   Medication Sig Dispense Refill    alcohol swabs (ALCOHOL PADS) PadM Apply 1 each topically as needed. 11 each 11    alendronate (FOSAMAX) 70 MG tablet Take 1 tablet (70 mg total) by mouth every 7 days. Take with a full glass of water & remain upright for at least 30 minutes 12 tablet 3    amLODIPine (NORVASC) 10 MG tablet Take 1 tablet (10 mg total) by mouth once daily. 30 tablet 11    aspirin 81 MG Chew Take 1 tablet (81 mg total) by mouth once daily.  0    atorvastatin (LIPITOR) 80 MG tablet Take 1 tablet (80 mg total) by mouth once daily. 90 tablet 2    blood sugar diagnostic Strp 1 strip by Misc.(Non-Drug; Combo Route) route once daily. 100 strip 3    carvediloL (COREG) 12.5 MG tablet Take 2 tablets (25 mg total) by mouth 2 (two) times daily with meals. 120 tablet 0    clopidogrel (PLAVIX) 75 mg tablet Take 1 tablet (75 mg total) by mouth once daily. 30 tablet 10    diclofenac sodium (VOLTAREN) 1 % Gel Apply 2 g topically 4 (four) times daily. 1 Tube 2    dulaglutide (TRULICITY) 1.5 mg/0.5 mL PnIj Inject 1.5 mg into the skin every 7 days.      ferrous sulfate (FEOSOL) 325 mg (65 mg iron) Tab tablet Take 1 tablet (325 mg total) by mouth once daily. 90 tablet 0    flash glucose scanning reader Misc Use as directed. 1 each 0    flash glucose sensor Kit 1 Device by Misc.(Non-Drug; Combo Route) route every 14 (fourteen) days. 2 kit 5    glipiZIDE (GLUCOTROL) 10 MG tablet Take 1 tablet (10 mg total) by mouth daily with breakfast. 90 tablet 0    insulin (BASAGLAR KWIKPEN U-100 INSULIN) glargine 100 units/mL (3mL) SubQ pen Inject 15 Units into the skin once daily. 3 Box 11    lancets Misc 1 lancet by Misc.(Non-Drug; Combo Route) route once daily. 100 each 3    multivitamin (ONE DAILY MULTIVITAMIN) per tablet Take 1 tablet by mouth once daily.      nitroGLYCERIN (NITROSTAT) 0.4 MG SL tablet Place 1 tablet (0.4 mg  "total) under the tongue every 5 (five) minutes as needed for Chest pain. 30 tablet 1    pen needle, diabetic 31 gauge x 5/16" Ndle Use every evening. 100 each 11    tiZANidine (ZANAFLEX) 4 MG tablet Take 1 tablet (4 mg total) by mouth 2 (two) times daily as needed (Muscle spasm.). 30 tablet 1    valsartan (DIOVAN) 320 MG tablet Take 1 tablet (320 mg total) by mouth once daily. 30 tablet 5    clotrimazole (LOTRIMIN) 1 % cream Apply topically 2 (two) times daily. (Patient not taking: Reported on 2/27/2020) 28 g 1     No current facility-administered medications on file prior to visit.          Review of patient's allergies indicates:   Allergen Reactions    Keflex [cephalexin] Other (See Comments)     Turns orange    Bactrim [sulfamethoxazole-trimethoprim]      Generic version  Sulfa makes her sick         Social History     Socioeconomic History    Marital status:      Spouse name: Not on file    Number of children: 1    Years of education: Not on file    Highest education level: Not on file   Occupational History    Not on file   Social Needs    Financial resource strain: Not on file    Food insecurity:     Worry: Not on file     Inability: Not on file    Transportation needs:     Medical: Not on file     Non-medical: Not on file   Tobacco Use    Smoking status: Never Smoker    Smokeless tobacco: Never Used   Substance and Sexual Activity    Alcohol use: Yes     Alcohol/week: 1.0 standard drinks     Types: 1 Shots of liquor per week     Comment: social drinker    Drug use: No    Sexual activity: Yes     Partners: Male     Birth control/protection: Post-menopausal   Lifestyle    Physical activity:     Days per week: Not on file     Minutes per session: Not on file    Stress: Not on file   Relationships    Social connections:     Talks on phone: Not on file     Gets together: Not on file     Attends Taoist service: Not on file     Active member of club or organization: Not on file     " "Attends meetings of clubs or organizations: Not on file     Relationship status: Not on file   Other Topics Concern    Not on file   Social History Narrative     with a son living locally.  at [employer], Retired 5/18.           Review of Systems   Constitutional: Negative.    HENT: Negative.    Eyes: Negative.    Respiratory: Negative.    Cardiovascular: Negative.    Gastrointestinal: Negative.    Endocrine: Negative.    Genitourinary: Negative.    Musculoskeletal: Negative.    Skin: Negative.    Allergic/Immunologic: Negative.    Neurological: Negative.    Hematological: Negative.    Psychiatric/Behavioral: Negative.        Objective:        Vitals:    02/27/20 1413   BP: 122/64   BP Location: Right arm   Patient Position: Sitting   BP Method: Large (Automatic)   Pulse: 89   Weight: 105.1 kg (231 lb 11.3 oz)   Height: 5' 2" (1.575 m)         Physical Exam   Constitutional: She is oriented to person, place, and time. She appears well-developed and well-nourished.   Cardiovascular: Intact distal pulses.   DP and PT pulses 2/4 loreta.   Edema noted loreta.   Capillary refill time < 3 seconds to digits 1-5, loreta.   Absent hair growth       Musculoskeletal: Normal range of motion. She exhibits deformity. She exhibits no edema or tenderness.   HAV noted to loreta 1st MPJ. 5/5 strength to all LE muscle groups. No POP noted     Neurological: She is alert and oriented to person, place, and time.   +paresthesias (Abnormal spontaneous sensations in feet)     Skin: Capillary refill takes 2 to 3 seconds. No erythema.   Toenails x 10 are elongated by 1-3mm, thickened by 1-3mm and mycotic with subungual debris      Psychiatric: She has a normal mood and affect. Her behavior is normal. Judgment and thought content normal.               Assessment:       Problem List Items Addressed This Visit     None      Visit Diagnoses     Type II diabetes mellitus with neurological manifestations    -  Primary    " Relevant Orders    Routine Foot Care    Dermatophytosis of nail        Relevant Orders    Routine Foot Care    [place] or callus        Relevant Orders    Routine Foot Care            Plan:              -I counseled the pt on her condition and management    -Shoe inspection. Diabetic Foot Education. Patient reminded of the importance of good nutrition and blood sugar control to help prevent podiatric complications of diabetes. Patient instructed on proper foot hygeine. We discussed wearing proper shoe gear, daily foot inspections, never walking without protective shoe gear    -Continue Gold bond lotion for diabetics for dry skin on the feet.  Using daily.       Routine Foot Care  Date/Time: 2/27/2020 2:00 PM  Performed by: Donna Gillis DPM  Authorized by: Donna Gillis DPM     Consent Done?:  Yes (Verbal)  Hyperkeratotic Skin Lesions?: Yes    Number of trimmed lesions:  2  Location(s):  Left 1st Toe and Right 1st Toe    Nail Care Type:  Debride  Location(s): All  (Left 1st Toe, Left 3rd Toe, Left 2nd Toe, Left 4th Toe, Left 5th Toe, Right 1st Toe, Right 2nd Toe, Right 3rd Toe, Right 4th Toe and Right 5th Toe)  Patient tolerance:  Patient tolerated the procedure well with no immediate complications     With patient's permission, the toenails mentioned above were aggressively reduced and debrided using a nail nipper, removing all offending nail and debris. Utilizing a #15 scalpel, I trimmed the corns and calluses at the above mentioned location.      The patient will continue to monitor the areas daily, inspect the feet, wear protective shoe gear when ambulatory, and moisturizer to maintain skin integrity.        Follow up in about 4 months (around 6/27/2020) for foot exam, or sooner if concerned.          Donna Gillis DPM  NPI: [id number]         [facility] - POD[facility]ATRY  [address] [address]  Dept: [phone]  Dept Fax: [phone]              Donna Gillis  HELENE  NPI: [id number]         [facility] - PODIATRY  [address] [address]  Dept: [phone]  Dept Fax: [phone]

## 2020-02-27 NOTE — PATIENT INSTRUCTIONS
General nail care measures for abnormal nails include:    ? Keeping nails trimmed short    ? Avoiding trauma     ? Avoiding contact irritants     ? Keeping nails dry (avoiding wet work)    ? Avoiding all nail cosmetics       How to Check Your Feet    Below are tips to help you look for foot problems. Try to check your feet at the same time each day, such as when you get out of bed in the morning.    · Check the top of each foot. The tops of toes, back of the heel, and outer edge of the foot can get a lot of rubbing from poor-fitting shoes.    · Check the bottom of each foot. Daily wear and tear often leads to problems at pressure spots.    · Check the toes and nails. Fungal infections often occur between toes. Toenail problems can also be a sign of fungal infections or lead to breaks in the skin.    · Check your shoes, too. Loose objects inside a shoe can injure the foot. Use your hand to feel inside your shoes for things like gissell, loose stitching, or rough areas that could irritate your skin.        Diabetic Foot Care    Diabetes can lead to a number of different foot complications. Fortunately, most of these complications can be prevented with a little extra foot care. If diabetes is not well controlled, the high blood sugar can cause damage to blood vessels and result in poor circulation to the foot. When the skin does not get enough blood flow, it becomes prone to pressure sores and ulcers, which heal slowly.  High blood sugar can also damage nerves, interfering with the ability to feel pain and pressure. When you cant feel your foot normally, it is easy to injure your skin, bones and joints without knowing it. For these reasons diabetes increases the risk of fungal infections, bunions and ulcers. Deep ulcers can lead to bone infection. Gangrene is the most serious foot complication of diabetes. It usually occurs on the tips of the toes as blacked areas of skin. The black area is dead tissue. In severe  cases, gangrene spreads to involve the entire toe, other toes and the entire foot. Foot or toe amputation may be required. Good foot care and blood sugar control can prevent this.    Home Care  1. Wear comfortable, proper fitting shoes.  2. Wash your feet daily with warm water and mild soap.  3. After drying, apply a moisturizing cream or lotion.  4. Check your feet daily for skin breaks, blisters, swelling, or redness. Look between your toes also.  5. Wear cotton socks and change them every day.  6. Trim toe nails carefully and do not cut your cuticles.  7. Strive to keep your blood sugar under control with a combination of medicines, diet and activity.  8. If you smoke and have diabetes, it is very important that you stop. Smoking reduces blood flow to your foot.  9. Avoid activities that increase your risk of foot injury:  · Do not walk barefoot.  · Do not use heating pads or hot water bottles on your feet.  · Do not put your foot in a hot tub without first checking the temperature with your hand.  10) Schedule yearly foot exams.    Follow Up  with your doctor or as advised by our staff. Report any cut, puncture, scrape, other injury, blister, ingrown toenail or ulcer on your foot.    Get Prompt Medical Attention  if any of the following occur:  -- Open ulcer with pus draining from the wound  -- Increasing foot or leg pain  -- New areas of redness or swelling or tender areas of the foot    © 3096-3709 [employer]. [address] [place] [address], [place] [address]. All rights reserved. This information is not intended as a substitute for professional medical care. Always follow your healthcare professional's instructions.

## 2020-02-28 NOTE — PROGRESS NOTES
Digital Medicine: Health  Follow-Up    Patient reports she is unsure about elevated BP reading on 2/26.  Reports she immediately took a second reading on her right arm and it went down about 40pts.  Reviewed technique.  Informed patient to wait about 5-10min between readings.  States she is going to cardiology next week and will have her BP cuff compared since her office reading yesterday was 122/64 mmHg.    The history is provided by the patient.     Follow Up  Follow-up reason(s): reading review      Alert received.   Care Team received high BP alert.  Patient is not experiencing symptoms.      INTERVENTION(S)  reviewed monitoring technique, encouragement/support and denied questions    PLAN  patient verbalizes understanding and continue monitoring      There are no preventive care reminders to display for this patient.    Last 5 Patient Entered Readings                                      Current 30 Day Average: 144/87     Recent Readings 2/26/2020 2/26/2020 2/16/2020 2/1/2020 1/28/2020    SBP (mmHg) 144 187 143 146 150    DBP (mmHg) 90 91 81 84 96    Pulse 75 76 92 75 87        Last 6 Patient Entered Readings                                          Most Recent A1c: 6.7% on 12/11/2019  (Goal: 8%)     Recent Readings 2/1/2020 1/28/2020 1/25/2020 1/22/2020 1/20/2020    Blood Glucose (mg/dL) 148 100 98 101 169                    Physical Activity Screening       Reports she is starting PT next week.      SDOH

## 2020-03-03 ENCOUNTER — CLINICAL SUPPORT (OUTPATIENT)
Dept: REHABILITATION | Facility: HOSPITAL | Age: 75
End: 2020-03-03
Attending: INTERNAL MEDICINE
Payer: MEDICARE

## 2020-03-03 ENCOUNTER — PATIENT OUTREACH (OUTPATIENT)
Dept: ADMINISTRATIVE | Facility: OTHER | Age: 75
End: 2020-03-03

## 2020-03-03 DIAGNOSIS — R53.81 PHYSICAL DEBILITY: ICD-10-CM

## 2020-03-03 PROCEDURE — 97530 THERAPEUTIC ACTIVITIES: CPT | Mod: PO

## 2020-03-03 PROCEDURE — 97110 THERAPEUTIC EXERCISES: CPT | Mod: PO

## 2020-03-03 NOTE — PROGRESS NOTES
Chart reviewed.   Immunizations: updated  Orders placed: n/a  Upcoming appts to satisfy SYDE topics: Hemoglobin A1c 5/20

## 2020-03-03 NOTE — PROGRESS NOTES
Physical Therapy Daily Treatment Note     Name: Kaylee Uerna [facility] Number: [id number]    Therapy Diagnosis:   Encounter Diagnosis   Name Primary?    Physical debility      Physician: Liz Roberts, *    Visit Date: 3/3/2020  Physician Orders: PT Eval and Treat   Medical Diagnosis from Referral:   E11.22,N18.3,Z79.4 (ICD-10-CM) - Type 2 diabetes mellitus with stage 3 chronic kidney disease, with long-term current use of insulin   I25.10 (ICD-10-CM) - Coronary artery disease involving native coronary artery of native heart without angina pectoris   R53.81 (ICD-10-CM) - Physical deconditioning   R26.81 (ICD-10-CM) - Gait instability         Evaluation Date: 1/14/2020  Authorization Period Expiration: 12/30/2020  Plan of Care Expiration: 3/12/2020  Visit # / Visits authorized: 4/ 6     Time In: 2:10  Time Out: 3:30  Total Billable Time: 45 minutes     Precautions: Standard, Diabetes and CAD      Subjective     Pt reports: that she was able to make it to the conference in [place] walking with a SP cane.  She reported that she is using the cane less often to ambulate.  She also reported that she visited with the folks at [facility] and they were pleased that she was continuing PT.  She was compliant with home exercise program.  Response to previous treatment: Pt reported that she felt ok after her last tx visit.  Functional change: none    Pain: 0/10  Location: n/a     Objective          Lower Extremity Strength  Right LE   Left LE     Knee extension: 4/5 Knee extension: 4/5   Knee flexion: 3/5 Knee flexion: 3/5   Hip flexion: 3/5 Hip flexion: 3/5   Hip extension:  nt Hip extension: nt   Hip abduction: nt Hip abduction: nt   Hip adduction: nt Hip adduction nt   Ankle dorsiflexion: nt Ankle dorsiflexion: nt   Ankle plantarflexion: nt Ankle plantarflexion: nt      Sensation: Pt reports decreased sensation in her L thumb     Shoulder Range of Motion:   Shoulder Left Right   Flexion 140 155  "  Abduction 135 145   ER 60 90   IR 60 70         Upper Extremity Strength  (R) UE   (L) UE     Shoulder flexion: 4/5 Shoulder flexion: nt   Shoulder Abduction: 4/5 Shoulder abduction: nt   Shoulder ER 3+/5 Shoulder ER 3/5p   Shoulder IR 4+/5 Shoulder IR 4/5p   Elbow flexion: 4+/5 Elbow flexion: 4/5   Elbow extension: 4+/5 Elbow extension: 4/5   Wrist flexion: 4/5 Wrist flexion: 4/5   Wrist extension: 4/5 Wrist extension: 4/5    4/5 : 4/5   Lower Trap 3+/5 Lower Trap 3+/5   Middle Trap 3+/5 Middle Trap 3+/5   Rhomboids nt Rhomboids nt        Kaylee received therapeutic exercises to develop strength and endurance for 25  minutes including:    Supine B hip adduction 10 x 5 with a ball   Supine B hip abduction 10 x 5 with an orange TB   Bridges with isometric hip abd 2 x 10   Ambulated ~300 ft with no AD, Supervision   Seated hip flexion 10 x 5   Gastroc stretch on slant 2x30 sec  Standing toe raises 1x10 with 3 sec holds      Not performed:  Seated knee extension 10 x 5  Seated HS curls with orange TB 10 x 2 R & L  Ambulated ~300 ft with no AD, Supervision  Sit to stands form elevated hi/low table 10 x 1   Standing heel raises 15 x 1  Standing rows 10 x 2      Kaylee participated in neuromuscular re-education activities to improve: Balance for 15  minutes. The following activities were included:    -Step overs a 2 inch step, unilateral UE support, CGA: 10 x 1 B  -step over 3" lary 10 x 1 B  -Side stepping 20 x 2 at the wall  Foam:  Static standing, no UE support, eyes closed, CGA/min A: 30 sec x 2  Static standing, no UE support, eyes open with horizontal head turns, CGA: 30 sec x 2    Step ups on foam, no UE support, CGA: 10 x 1 B        Pt received an Ice pack to her R knee post Tx.  Home Exercises Provided and Patient Education Provided     Education provided:   - yes    Written Home Exercises Provided: yes.  Exercises were reviewed and Kaylee was able to demonstrate them prior to the end of the session.  " Kaylee demonstrated good  understanding of the education provided.     See EMR under Patient Instructions for exercises provided 1/23/2020.    Assessment   Pt was able to tolerate tx well.  She exhibits improved static standing balance.  Pt with improved shoulder ROM Will progress as tolerated.     Kaylee is progressing well towards her goals.   Pt prognosis is Good.     Pt will continue to benefit from skilled outpatient physical therapy to address the deficits listed in the problem list box on initial evaluation, provide pt/family education and to maximize pt's level of independence in the home and community environment.     Pt's spiritual, cultural and educational needs considered and pt agreeable to plan of care and goals.     Anticipated barriers to physical therapy: none    Goals:   Short Term Goals: 2 weeks   Pt will be independent in a HEP to address their functional deficits related to entrance into the [facility] Rehabilitation program - progressing 3/3/2020     Long Term Goals: 4 weeks   Improve B LE MMT by 1/2 grade to show improvement in strength - progressing 3/3/2020  Pt able to tolerate walking 300 ft over a level surface without her cane - met 3/3/2020  Pt to have met criteria to enter a Cardiac Rehabilitation program - progressing 3/3/2020  Plan     Continue Physical Therapy treatments 2x/week for 4 weeks to prepare her to enter cardiac rehabilitation.     Patrick Covarrubias, PT

## 2020-03-04 ENCOUNTER — OFFICE VISIT (OUTPATIENT)
Dept: CARDIOLOGY | Facility: CLINIC | Age: 75
End: 2020-03-04
Payer: MEDICARE

## 2020-03-04 VITALS
WEIGHT: 231.5 LBS | BODY MASS INDEX: 41.02 KG/M2 | DIASTOLIC BLOOD PRESSURE: 87 MMHG | HEART RATE: 83 BPM | SYSTOLIC BLOOD PRESSURE: 138 MMHG | HEIGHT: 63 IN

## 2020-03-04 DIAGNOSIS — I25.810 CORONARY ARTERY DISEASE INVOLVING CORONARY BYPASS GRAFT OF NATIVE HEART WITHOUT ANGINA PECTORIS: Primary | ICD-10-CM

## 2020-03-04 DIAGNOSIS — Z95.1 S/P CABG (CORONARY ARTERY BYPASS GRAFT): ICD-10-CM

## 2020-03-04 DIAGNOSIS — E11.22 TYPE 2 DIABETES MELLITUS WITH STAGE 3 CHRONIC KIDNEY DISEASE AND HYPERTENSION: ICD-10-CM

## 2020-03-04 DIAGNOSIS — N18.30 TYPE 2 DIABETES MELLITUS WITH STAGE 3 CHRONIC KIDNEY DISEASE AND HYPERTENSION: ICD-10-CM

## 2020-03-04 DIAGNOSIS — Z95.5 S/P DRUG ELUTING CORONARY STENT PLACEMENT: ICD-10-CM

## 2020-03-04 DIAGNOSIS — I12.9 TYPE 2 DIABETES MELLITUS WITH STAGE 3 CHRONIC KIDNEY DISEASE AND HYPERTENSION: ICD-10-CM

## 2020-03-04 DIAGNOSIS — I21.4 NSTEMI (NON-ST ELEVATED MYOCARDIAL INFARCTION): ICD-10-CM

## 2020-03-04 DIAGNOSIS — E78.2 MIXED HYPERLIPIDEMIA: ICD-10-CM

## 2020-03-04 DIAGNOSIS — I10 ESSENTIAL HYPERTENSION: ICD-10-CM

## 2020-03-04 DIAGNOSIS — I25.10 CORONARY ARTERY DISEASE INVOLVING NATIVE CORONARY ARTERY OF NATIVE HEART WITHOUT ANGINA PECTORIS: ICD-10-CM

## 2020-03-04 PROCEDURE — 99999 PR PBB SHADOW E&M-EST. PATIENT-LVL III: CPT | Mod: PBBFAC,GC,, | Performed by: STUDENT IN AN ORGANIZED HEALTH CARE EDUCATION/TRAINING PROGRAM

## 2020-03-04 PROCEDURE — 1126F AMNT PAIN NOTED NONE PRSNT: CPT | Mod: GC,S$GLB,, | Performed by: STUDENT IN AN ORGANIZED HEALTH CARE EDUCATION/TRAINING PROGRAM

## 2020-03-04 PROCEDURE — 99214 PR OFFICE/OUTPT VISIT, EST, LEVL IV, 30-39 MIN: ICD-10-PCS | Mod: GC,S$GLB,, | Performed by: STUDENT IN AN ORGANIZED HEALTH CARE EDUCATION/TRAINING PROGRAM

## 2020-03-04 PROCEDURE — 1159F PR MEDICATION LIST DOCUMENTED IN MEDICAL RECORD: ICD-10-PCS | Mod: GC,S$GLB,, | Performed by: STUDENT IN AN ORGANIZED HEALTH CARE EDUCATION/TRAINING PROGRAM

## 2020-03-04 PROCEDURE — 1159F MED LIST DOCD IN RCRD: CPT | Mod: GC,S$GLB,, | Performed by: STUDENT IN AN ORGANIZED HEALTH CARE EDUCATION/TRAINING PROGRAM

## 2020-03-04 PROCEDURE — 1101F PR PT FALLS ASSESS DOC 0-1 FALLS W/OUT INJ PAST YR: ICD-10-PCS | Mod: CPTII,GC,S$GLB, | Performed by: STUDENT IN AN ORGANIZED HEALTH CARE EDUCATION/TRAINING PROGRAM

## 2020-03-04 PROCEDURE — 3044F HG A1C LEVEL LT 7.0%: CPT | Mod: CPTII,GC,S$GLB, | Performed by: STUDENT IN AN ORGANIZED HEALTH CARE EDUCATION/TRAINING PROGRAM

## 2020-03-04 PROCEDURE — 3075F PR MOST RECENT SYSTOLIC BLOOD PRESS GE 130-139MM HG: ICD-10-PCS | Mod: CPTII,GC,S$GLB, | Performed by: STUDENT IN AN ORGANIZED HEALTH CARE EDUCATION/TRAINING PROGRAM

## 2020-03-04 PROCEDURE — 3075F SYST BP GE 130 - 139MM HG: CPT | Mod: CPTII,GC,S$GLB, | Performed by: STUDENT IN AN ORGANIZED HEALTH CARE EDUCATION/TRAINING PROGRAM

## 2020-03-04 PROCEDURE — 1126F PR PAIN SEVERITY QUANTIFIED, NO PAIN PRESENT: ICD-10-PCS | Mod: GC,S$GLB,, | Performed by: STUDENT IN AN ORGANIZED HEALTH CARE EDUCATION/TRAINING PROGRAM

## 2020-03-04 PROCEDURE — 1101F PT FALLS ASSESS-DOCD LE1/YR: CPT | Mod: CPTII,GC,S$GLB, | Performed by: STUDENT IN AN ORGANIZED HEALTH CARE EDUCATION/TRAINING PROGRAM

## 2020-03-04 PROCEDURE — 3079F PR MOST RECENT DIASTOLIC BLOOD PRESSURE 80-89 MM HG: ICD-10-PCS | Mod: CPTII,GC,S$GLB, | Performed by: STUDENT IN AN ORGANIZED HEALTH CARE EDUCATION/TRAINING PROGRAM

## 2020-03-04 PROCEDURE — 3079F DIAST BP 80-89 MM HG: CPT | Mod: CPTII,GC,S$GLB, | Performed by: STUDENT IN AN ORGANIZED HEALTH CARE EDUCATION/TRAINING PROGRAM

## 2020-03-04 PROCEDURE — 99999 PR PBB SHADOW E&M-EST. PATIENT-LVL III: ICD-10-PCS | Mod: PBBFAC,GC,, | Performed by: STUDENT IN AN ORGANIZED HEALTH CARE EDUCATION/TRAINING PROGRAM

## 2020-03-04 PROCEDURE — 99214 OFFICE O/P EST MOD 30 MIN: CPT | Mod: GC,S$GLB,, | Performed by: STUDENT IN AN ORGANIZED HEALTH CARE EDUCATION/TRAINING PROGRAM

## 2020-03-04 PROCEDURE — 3044F PR MOST RECENT HEMOGLOBIN A1C LEVEL <7.0%: ICD-10-PCS | Mod: CPTII,GC,S$GLB, | Performed by: STUDENT IN AN ORGANIZED HEALTH CARE EDUCATION/TRAINING PROGRAM

## 2020-03-04 RX ORDER — CHLORTHALIDONE 25 MG/1
12.5 TABLET ORAL DAILY
Qty: 15 TABLET | Refills: 11 | Status: SHIPPED | OUTPATIENT
Start: 2020-03-04 | End: 2020-06-15 | Stop reason: CLARIF

## 2020-03-04 NOTE — PROGRESS NOTES
Subjective:   Patient ID:  Kaylee Romero is a 75 y.o. female who presents for follow up of NSTEMI s/p PCI.    HPI:   74F with CAD s/p NSTEMI 07/2019, CABG x 2 08/12/2019 (LIMA to LAD and SVG to OM), NSTEMI 10/2018 s/p PCI/BEA mid LAD and proximal RCA (suspected culprit), IDDM, HTN, HLD, obesity, KAMRAN on CPAP, CKD who presents for follow up.     Last seen 3 months ago post PCI. Doing well at that time, encouraged enrollement in cardiac rehab. Asymptomatic.    Returns for follow up today. Still has not enrolled in cardiac rehab; currently working with PT for deconditioning and balance issues. Feels she needs to improve strength to be able to fully participate. Previously tried exercise pedals at home but tweaked her knee, requiring cortisone injection so reluctant to do stationary bike at rehab.   Denies chest pain, palpitations, dyspnea, pleurisy, cough, PND, orthopnea, JONAH, claudication, presyncope/syncope.  BP on review mostly mildly above goal <130/80.   Taking all medications without issue.    Several concerns addressed- chronic, nonproductive cough intermittent. Occasional hot flashes, new since CABG. Incisional soreness, mild but not totally resolved.     [facility] 10/28/19  · Three vessel coronary artery disease.  · Successful PCI.  · Patent LIMA to LAD, however this provides only the apical LAD and is a very small vessel  · Patent SVG to OM with 90% distal anastomotic stenosis  · Successful PCI with BEA to mid LAD which provides large diagonal branch not bypassed  · Successful PCI with BEA to proximal RCA which appeared to be the cullprit vessel  · Estimated blood loss: <50 mL    TTE 10/27/19  · Concentric left ventricular remodeling. Increased (hyperdynamic) left ventricular systolic function. The estimated ejection fraction is 73%  · Normal right ventricular systolic function.  · Normal LV diastolic function.  · Normal central venous pressure (3 mm Hg).  · The estimated PA systolic pressure is 32 mm  Hg    Review of Systems   Constitution: Negative for chills and fever.   Cardiovascular: Negative for chest pain, dyspnea on exertion, irregular heartbeat, leg swelling, orthopnea, paroxysmal nocturnal dyspnea and syncope.   Respiratory: Negative for shortness of breath.    Gastrointestinal: Negative for abdominal pain, nausea and vomiting.       Past Medical History:   Diagnosis Date    Acid reflux     Age-related osteoporosis without current pathological fracture 2019    Cataract     CKD (chronic kidney disease) stage 3, GFR 30-59 ml/min     Dry mouth     History of TIA (transient ischemic attack) 2018    Hyperlipidemia 2014    Hypertensive heart disease with diastolic heart failure 2012    Morbid obesity with body mass index (BMI) of 40.0 or higher 2016    KAMRAN (obstructive sleep apnea)     KAMRAN on CPAP 2016    Osteoporosis without current pathological fracture 2018    Physical deconditioning 2018    Status post total left knee replacement 2017    Type 2 diabetes mellitus with stage 3 chronic kidney disease, without long-term current use of insulin 2019       Past Surgical History:   Procedure Laterality Date    ankle surgery (l)      APPENDECTOMY       SECTION      CORONARY ARTERY BYPASS GRAFT  09/2019    x 2    CORONARY ARTERY BYPASS GRAFT (CABG) N/A 2019    Procedure: CORONARY ARTERY BYPASS GRAFT (CABG)X3;  Surgeon: Petreson Ventura MD;  Location: [facility] OR [address] [place] [address];  Service: Cardiovascular;  Laterality: N/A;    DILATION AND CURETTAGE OF UTERUS      ENDOSCOPIC HARVEST OF VEIN Left 2019    Procedure: SURGICAL PROCUREMENT, VEIN, ENDOSCOPIC;  Surgeon: Peterson Ventura MD;  Location: [facility] OR [address] [place] [address];  Service: Cardiovascular;  Laterality: Left;  Vein [place] Start: 843; End: 105   Vein Prep Start: 105; End: 1145    KNEE ARTHROSCOPY W/ DEBRIDEMENT      KNEE SURGERY Left 2017    TKR    LEFT HEART  CATHETERIZATION Left 7/9/2019    Procedure: Left heart cath;  Surgeon: Ronak Gibbons MD;  Location: [facility] CATH LAB;  Service: Cardiology;  Laterality: Left;       Social History     Tobacco Use    Smoking status: Never Smoker    Smokeless tobacco: Never Used   Substance Use Topics    Alcohol use: Yes     Alcohol/week: 1.0 standard drinks     Types: 1 Shots of liquor per week     Comment: rare    Drug use: No       Family History   Problem Relation Age of Onset    Heart disease Mother     Heart failure Mother     Heart disease Father     Stroke Father     Heart failure Father     Diabetes Father     Stroke Paternal Grandfather     No Known Problems Brother     No Known Problems Son     Breast cancer Neg Hx     Colon cancer Neg Hx     Ovarian cancer Neg Hx     Amblyopia Neg Hx     Blindness Neg Hx     Cancer Neg Hx     Cataracts Neg Hx     Glaucoma Neg Hx     Hypertension Neg Hx     Macular degeneration Neg Hx     Retinal detachment Neg Hx     Strabismus Neg Hx     Thyroid disease Neg Hx        Current Outpatient Medications   Medication Sig    alcohol swabs (ALCOHOL PADS) PadM Apply 1 each topically as needed.    alendronate (FOSAMAX) 70 MG tablet Take 1 tablet (70 mg total) by mouth every 7 days. Take with a full glass of water & remain upright for at least 30 minutes    amLODIPine (NORVASC) 10 MG tablet Take 1 tablet (10 mg total) by mouth once daily.    aspirin 81 MG Chew Take 1 tablet (81 mg total) by mouth once daily.    atorvastatin (LIPITOR) 80 MG tablet Take 1 tablet (80 mg total) by mouth once daily.    blood sugar diagnostic Strp 1 strip by Misc.(Non-Drug; Combo Route) route once daily.    carvediloL (COREG) 12.5 MG tablet Take 2 tablets (25 mg total) by mouth 2 (two) times daily with meals.    clopidogrel (PLAVIX) 75 mg tablet Take 1 tablet (75 mg total) by mouth once daily.    clotrimazole (LOTRIMIN) 1 % cream Apply topically 2 (two) times daily.    diclofenac  "sodium (VOLTAREN) 1 % Gel Apply 2 g topically 4 (four) times daily.    dulaglutide (TRULICITY) 1.5 mg/0.5 mL PnIj Inject 1.5 mg into the skin every 7 days.    ferrous sulfate (FEOSOL) 325 mg (65 mg iron) Tab tablet Take 1 tablet (325 mg total) by mouth once daily.    flash glucose scanning reader Misc Use as directed.    flash glucose sensor Kit 1 Device by Misc.(Non-Drug; Combo Route) route every 14 (fourteen) days.    glipiZIDE (GLUCOTROL) 10 MG tablet Take 1 tablet (10 mg total) by mouth daily with breakfast.    insulin (BASAGLAR KWIKPEN U-100 INSULIN) glargine 100 units/mL (3mL) SubQ pen Inject 15 Units into the skin once daily.    lancets Misc 1 lancet by Misc.(Non-Drug; Combo Route) route once daily.    multivitamin (ONE DAILY MULTIVITAMIN) per tablet Take 1 tablet by mouth once daily.    nitroGLYCERIN (NITROSTAT) 0.4 MG SL tablet Place 1 tablet (0.4 mg total) under the tongue every 5 (five) minutes as needed for Chest pain.    pen needle, diabetic 31 gauge x 5/16" Ndle Use every evening.    tiZANidine (ZANAFLEX) 4 MG tablet Take 1 tablet (4 mg total) by mouth 2 (two) times daily as needed (Muscle spasm.).    valsartan (DIOVAN) 320 MG tablet Take 1 tablet (320 mg total) by mouth once daily.    chlorthalidone (HYGROTEN) 25 MG Tab Take 0.5 tablets (12.5 mg total) by mouth once daily.     No current facility-administered medications for this visit.        Review of patient's allergies indicates:   Allergen Reactions    Bactrim [sulfamethoxazole-trimethoprim] Other (See Comments)     Generic version  Sulfa makes her sick    Keflex [cephalexin] Other (See Comments)     Turns orange       Objective:     /87 (BP Location: Left arm, Patient Position: Sitting, BP Method: Large (Automatic))   Pulse 83   Ht 5' 2.5" (1.588 m)   Wt 105 kg (231 lb 7.7 oz)   LMP 01/09/2001 (Approximate)   BMI 41.66 kg/m²     Physical Exam  Vital signs reviewed  Constitutional: Oriented to person, place, and time. " Appears  well-developed and well-nourished.   HENT:   Head: Normocephalic and atraumatic.   Eyes: EOM are normal.   Neck: Normal range of motion. No JVD present.   Cardiovascular: Normal rate, regular rhythm, normal heart sounds. 2/6 systolic flow murmur. Exam reveals no gallop and no friction rub.   Pulmonary/Chest: Effort normal and breath sounds normal. No wheeze or rales present. No chest wall tenderness  Abdominal: Soft. Bowel sounds are normal. There is no tenderness. There is no guarding.   Musculoskeletal: There is no edema present   Skin: Skin is warm and dry.          Chemistry        Component Value Date/Time     11/14/2019 1608    K 3.7 11/14/2019 1608     11/14/2019 1608    CO2 25 11/14/2019 1608    BUN 14 11/14/2019 1608    CREATININE 1.1 11/14/2019 1608    GLU 50 (L) 11/14/2019 1608        Component Value Date/Time    CALCIUM 10.2 11/14/2019 1608    ALKPHOS 109 10/26/2019 0052    AST 29 10/26/2019 0052    ALT 28 10/26/2019 0052    BILITOT 0.2 10/26/2019 0052    ESTGFRAFRICA 58.0 (A) 11/14/2019 1608    EGFRNONAA 50.3 (A) 11/14/2019 1608            Lab Results   Component Value Date    CHOL 103 (L) 12/11/2019    CHOL 134 07/09/2019    CHOL 145 04/11/2019     Lab Results   Component Value Date    HDL 39 (L) 12/11/2019    HDL 43 07/09/2019    HDL 49 04/11/2019     Lab Results   Component Value Date    LDLCALC 52.0 (L) 12/11/2019    LDLCALC 70.8 07/09/2019    LDLCALC 83.2 04/11/2019     Lab Results   Component Value Date    TRIG 60 12/11/2019    TRIG 101 07/09/2019    TRIG 64 04/11/2019     Lab Results   Component Value Date    CHOLHDL 37.9 12/11/2019    CHOLHDL 32.1 07/09/2019    CHOLHDL 33.8 04/11/2019         Lab Results   Component Value Date     11/14/2019    K 3.7 11/14/2019     11/14/2019    CO2 25 11/14/2019    BUN 14 11/14/2019    CREATININE 1.1 11/14/2019    GLU 50 (L) 11/14/2019    HGBA1C 6.7 (H) 12/11/2019    MG 1.7 10/29/2019    AST 29 10/26/2019    ALT 28 10/26/2019     ALBUMIN 3.7 10/26/2019    PROT 9.1 (H) 10/26/2019    BILITOT 0.2 10/26/2019    WBC 4.69 12/11/2019    HGB 10.3 (L) 12/11/2019    HCT 33.6 (L) 12/11/2019    HCT 25 (L) 08/12/2019    MCV 84 12/11/2019     12/11/2019    INR 1.0 10/26/2019    TSH 2.307 05/23/2019    CHOL 103 (L) 12/11/2019    HDL 39 (L) 12/11/2019    LDLCALC 52.0 (L) 12/11/2019    TRIG 60 12/11/2019     Assessment:     1. Coronary artery disease involving coronary bypass graft of native heart without angina pectoris     2. Essential hypertension  chlorthalidone (HYGROTEN) 25 MG Tab    BASIC METABOLIC PANEL   3. Coronary artery disease involving native coronary artery of native heart without angina pectoris     4. S/P CABG (coronary artery bypass graft)     5. S/P drug eluting coronary stent placement     6. NSTEMI (non-ST elevated myocardial infarction)     7. Mixed hyperlipidemia     8. Type 2 diabetes mellitus with stage 3 chronic kidney disease and hypertension       75F with above medical hx here for follow up.   Doing well, encouraged to continue working with PT and increasing activity.  Counseled on Mediterranean diet, low carbohydrate diet for weight loss.  BP mildly above goal, will add chlorthalidone 12.5 and check BMP.  Other noncardiac complaints - nonproductive intermittent cough, no signs/sx of CHF, not on ACEi, suspect may be allergy related. For incisional pain, recommended antiinflammatories prn, but should avoid long term courses of NSAIDs. Hot flashes non cardiac.     Plan:   CAD, NSTEMI 7/2019 s/p 2v CABG (LIMA-LAD, SVG-OM) 9/2019, NSTEMI 10/2019 s/p PCI mLAD and RCA  S/p CABG, S/p BEA  - successful PCI of the mid LAD and prox RCA 10/2019; residual stenosis of prox OM and distal anastamosis of SVG-OM  - asymptomatic, counseled on plan for optimal medical therapy for management of residual plaque and known CAD, lack of need for further investigation or intervention unless symptoms dictate  - Encouraged again regarding cardiac  rehab  - continue ASA 81 for lifetime, continue plavix for at least one year from PCI  - continue high intensity statin  - continue BB  - prn NTG, notify if requiring NTG  - BP goal <130; LDL goal <70  - cardiac rehab    HTN  - intermittently above goal <130/80 on coreg 25, norvasc 10, valsartan 320  - add chlorthalidone 12.5, check bmp in 1 week    Morbid Obesity  - cardiac rehab  - counseled on low sodium, mediterranean, low carbohydrate diet, weight loss    Dyslipidemia.   - continue statin    F/u in 6 months or as symptoms dictate    Washington Cortez MD  Cardiology Fellow PGY-6  Pager: [phone]

## 2020-03-04 NOTE — PLAN OF CARE
Physical Therapy Daily Treatment Note     Name: Kaylee Urena [facility] Number: [id number]    Therapy Diagnosis:   Encounter Diagnosis   Name Primary?    Physical debility      Physician: Liz Roberts, *    Visit Date: 3/3/2020  Physician Orders: PT Eval and Treat   Medical Diagnosis from Referral:   E11.22,N18.3,Z79.4 (ICD-10-CM) - Type 2 diabetes mellitus with stage 3 chronic kidney disease, with long-term current use of insulin   I25.10 (ICD-10-CM) - Coronary artery disease involving native coronary artery of native heart without angina pectoris   R53.81 (ICD-10-CM) - Physical deconditioning   R26.81 (ICD-10-CM) - Gait instability         Evaluation Date: 1/14/2020  Authorization Period Expiration: 12/30/2020  Plan of Care Expiration: 3/12/2020  Visit # / Visits authorized: 4/ 6     Time In: 2:10  Time Out: 3:30  Total Billable Time: 45 minutes     Precautions: Standard, Diabetes and CAD      Subjective     Pt reports: that she was able to make it to the conference in [place] walking with a SP cane.  She reported that she is using the cane less often to ambulate.  She also reported that she visited with the folks at [facility] Rehabilitation and they were pleased that she was continuing PT.  She was compliant with home exercise program.  Response to previous treatment: Pt reported that she felt ok after her last tx visit.  Functional change: none    Pain: 0/10  Location: n/a     Objective          Lower Extremity Strength  Right LE   Left LE     Knee extension: 4/5 Knee extension: 4/5   Knee flexion: 3/5 Knee flexion: 3/5   Hip flexion: 3/5 Hip flexion: 3/5   Hip extension:  nt Hip extension: nt   Hip abduction: nt Hip abduction: nt   Hip adduction: nt Hip adduction nt   Ankle dorsiflexion: nt Ankle dorsiflexion: nt   Ankle plantarflexion: nt Ankle plantarflexion: nt      Sensation: Pt reports decreased sensation in her L thumb     Shoulder Range of Motion:   Shoulder Left Right   Flexion 140 155  "  Abduction 135 145   ER 60 90   IR 60 70         Upper Extremity Strength  (R) UE   (L) UE     Shoulder flexion: 4/5 Shoulder flexion: nt   Shoulder Abduction: 4/5 Shoulder abduction: nt   Shoulder ER 3+/5 Shoulder ER 3/5p   Shoulder IR 4+/5 Shoulder IR 4/5p   Elbow flexion: 4+/5 Elbow flexion: 4/5   Elbow extension: 4+/5 Elbow extension: 4/5   Wrist flexion: 4/5 Wrist flexion: 4/5   Wrist extension: 4/5 Wrist extension: 4/5    4/5 : 4/5   Lower Trap 3+/5 Lower Trap 3+/5   Middle Trap 3+/5 Middle Trap 3+/5   Rhomboids nt Rhomboids nt        Kaylee received therapeutic exercises to develop strength and endurance for 25  minutes including:    Supine B hip adduction 10 x 5 with a ball   Supine B hip abduction 10 x 5 with an orange TB   Bridges with isometric hip abd 2 x 10   Ambulated ~300 ft with no AD, Supervision   Seated hip flexion 10 x 5   Gastroc stretch on slant 2x30 sec  Standing toe raises 1x10 with 3 sec holds      Not performed:  Seated knee extension 10 x 5  Seated HS curls with orange TB 10 x 2 R & L  Ambulated ~300 ft with no AD, Supervision  Sit to stands form elevated hi/low table 10 x 1   Standing heel raises 15 x 1  Standing rows 10 x 2      Kaylee participated in neuromuscular re-education activities to improve: Balance for 15  minutes. The following activities were included:    -Step overs a 2 inch step, unilateral UE support, CGA: 10 x 1 B  -step over 3" lary 10 x 1 B  -Side stepping 20 x 2 at the wall  Foam:  Static standing, no UE support, eyes closed, CGA/min A: 30 sec x 2  Static standing, no UE support, eyes open with horizontal head turns, CGA: 30 sec x 2    Step ups on foam, no UE support, CGA: 10 x 1 B        Pt received an Ice pack to her R knee post Tx.  Home Exercises Provided and Patient Education Provided     Education provided:   - yes    Written Home Exercises Provided: yes.  Exercises were reviewed and Kaylee was able to demonstrate them prior to the end of the session.  " Kaylee demonstrated good  understanding of the education provided.     See EMR under Patient Instructions for exercises provided 1/23/2020.    Assessment   Pt was able to tolerate tx well.  She exhibits improved static standing balance.  Pt with improved shoulder ROM Will progress as tolerated.     Kaylee is progressing well towards her goals.   Pt prognosis is Good.     Pt will continue to benefit from skilled outpatient physical therapy to address the deficits listed in the problem list box on initial evaluation, provide pt/family education and to maximize pt's level of independence in the home and community environment.     Pt's spiritual, cultural and educational needs considered and pt agreeable to plan of care and goals.     Anticipated barriers to physical therapy: none    Goals:   Short Term Goals: 2 weeks   Pt will be independent in a HEP to address their functional deficits related to entrance into the Cardiac Rehabilitation program - progressing 3/3/2020     Long Term Goals: 4 weeks   Improve B LE MMT by 1/2 grade to show improvement in strength - progressing 3/3/2020  Pt able to tolerate walking 300 ft over a level surface without her cane - met 3/3/2020  Pt to have met criteria to enter a Cardiac Rehabilitation program - progressing 3/3/2020  Plan     Continue Physical Therapy treatments 2x/week for 4 weeks to prepare her to enter cardiac rehabilitation.     Patrick Covarrubias, PT

## 2020-03-10 ENCOUNTER — CLINICAL SUPPORT (OUTPATIENT)
Dept: REHABILITATION | Facility: HOSPITAL | Age: 75
End: 2020-03-10
Attending: INTERNAL MEDICINE
Payer: MEDICARE

## 2020-03-10 DIAGNOSIS — R53.81 PHYSICAL DEBILITY: ICD-10-CM

## 2020-03-10 PROCEDURE — 97110 THERAPEUTIC EXERCISES: CPT | Mod: PO

## 2020-03-10 PROCEDURE — 97112 NEUROMUSCULAR REEDUCATION: CPT | Mod: PO

## 2020-03-10 NOTE — PROGRESS NOTES
Physical Therapy Daily Treatment Note      Name: Kaylee Urena [facility]  Clinic Number: [id number]     Therapy Diagnosis:        Encounter Diagnosis   Name Primary?    Physical debility        Physician: Liz Roberts, *     Visit Date: 3/10/2020  Physician Orders: PT Eval and Treat   Medical Diagnosis from Referral:   E11.22,N18.3,Z79.4 (ICD-10-CM) - Type 2 diabetes mellitus with stage 3 chronic kidney disease, with long-term current use of insulin   I25.10 (ICD-10-CM) - Coronary artery disease involving native coronary artery of native heart without angina pectoris   R53.81 (ICD-10-CM) - Physical deconditioning   R26.81 (ICD-10-CM) - Gait instability         Evaluation Date: 1/14/2020  Authorization Period Expiration: 12/30/2020  Plan of Care Expiration: 3/12/2020  Visit # / Visits authorized: 5/ 6     Time In: 2:05  Time Out: 3:00  Total Billable Time: 55 minutes     Precautions: Standard, Diabetes and CAD        Subjective      Pt reports:Pt states she is doing well, feels like she is improving some.   She was compliant with home exercise program.  Response to previous treatment: Pt reported that she felt ok after her last tx visit.  Functional change: none     Pain: 0/10  Location: n/a      Objective      Kaylee received therapeutic exercises to develop strength and endurance for 40  minutes including:     Supine B hip adduction 10 x 5 with a ball   Supine B hip abduction 10 x 5 with an orange TB   Bridges with isometric hip abd 2 x 10   Ambulated ~380 ft with no AD, Supervision   Seated hip flexion 10 x 5   Gastroc stretch on slant 2x30 sec  Standing toe raises 1x10 with 3 sec holds  Sit to stands form elevated hi/low table 10 x 2   Recumbent Bike 6 min, no resistance.          Not performed:  Seated knee extension 10 x 5  Seated HS curls with orange TB 10 x 2 R & L  Standing rows 10 x 2        Kaylee participated in neuromuscular re-education activities to improve: Balance for 15  minutes. The following  "activities were included:     -Step overs a 2 inch step, unilateral UE support, CGA: 10 x 1 B  -step over 3" lary 10 x 1 B  -Side stepping 20 x 2 at the wall  Foam:  Static standing, no UE support, eyes closed, CGA/min A: 30 sec x 2  Static standing, no UE support, eyes open with horizontal head turns, CGA: 30 sec x 2     Step ups on foam, no UE support, CGA: 10 x 1 B           Pt received an Ice pack to her R knee post Tx.  Home Exercises Provided and Patient Education Provided      Education provided:   - yes     Written Home Exercises Provided: yes.  Exercises were reviewed and Kaylee was able to demonstrate them prior to the end of the session.  Kaylee demonstrated good  understanding of the education provided.      See EMR under Patient Instructions for exercises provided 1/23/2020.     Assessment   Pt with improved endurance today.  O2 saturation did not dip below 98% during and post exercise.  Pt tolerated tx well without complaint.       Kaylee is progressing well towards her goals.   Pt prognosis is Good.      Pt will continue to benefit from skilled outpatient physical therapy to address the deficits listed in the problem list box on initial evaluation, provide pt/family education and to maximize pt's level of independence in the home and community environment.      Pt's spiritual, cultural and educational needs considered and pt agreeable to plan of care and goals.     Anticipated barriers to physical therapy: none     Goals:   Short Term Goals: 2 weeks   Pt will be independent in a HEP to address their functional deficits related to entrance into the Cardiac Rehabilitation program - progressing 3/3/2020     Long Term Goals: 4 weeks   Improve B LE MMT by 1/2 grade to show improvement in strength - progressing 3/3/2020  Pt able to tolerate walking 300 ft over a level surface without her cane - met 3/3/2020  Pt to have met criteria to enter a Cardiac Rehabilitation program - progressing 3/3/2020  Plan "      Continue Physical Therapy treatments 2x/week for 4 weeks to prepare her to enter cardiac rehabilitation.      Landon Golden, PT

## 2020-03-12 ENCOUNTER — CLINICAL SUPPORT (OUTPATIENT)
Dept: REHABILITATION | Facility: HOSPITAL | Age: 75
End: 2020-03-12
Attending: INTERNAL MEDICINE
Payer: MEDICARE

## 2020-03-12 DIAGNOSIS — R53.81 PHYSICAL DEBILITY: ICD-10-CM

## 2020-03-12 PROCEDURE — 97110 THERAPEUTIC EXERCISES: CPT | Mod: PO

## 2020-03-12 PROCEDURE — 97530 THERAPEUTIC ACTIVITIES: CPT | Mod: PO

## 2020-03-12 NOTE — PROGRESS NOTES
Physical Therapy Daily Treatment Note      Name: Kaylee Urena [facility] Number: [id number]     Therapy Diagnosis:        Encounter Diagnosis   Name Primary?    Physical debility        Physician: Liz Roberts, *     Visit Date: 3/10/2020  Physician Orders: PT Eval and Treat   Medical Diagnosis from Referral:   E11.22,N18.3,Z79.4 (ICD-10-CM) - Type 2 diabetes mellitus with stage 3 chronic kidney disease, with long-term current use of insulin   I25.10 (ICD-10-CM) - Coronary artery disease involving native coronary artery of native heart without angina pectoris   R53.81 (ICD-10-CM) - Physical deconditioning   R26.81 (ICD-10-CM) - Gait instability         Evaluation Date: 1/14/2020  Authorization Period Expiration: 12/30/2020  Plan of Care Expiration: 5/8/2020  Visit # / Visits authorized: 5/ 6     Time In: 2:05  Time Out: 3:00  Total Billable Time: 55 minutes     Precautions: Standard, Diabetes and CAD        Subjective      Pt reports:Pt states she is doing well, feels like she is improving some.   She was compliant with home exercise program.  Response to previous treatment: Pt reported that she felt ok after her last tx visit.  Functional change: none     Pain: 0/10  Location: n/a      Objective           Lower Extremity Strength  Right LE   Left LE     Knee extension: 4/5 Knee extension: 4/5   Knee flexion: 3/5 Knee flexion: 3/5   Hip flexion: 3/5 Hip flexion: 3/5   Hip extension:  2/5 Hip extension: 2/5   Hip abduction: 2/5 Hip abduction: 2/5   Hip adduction: 2/5 Hip adduction 2/5   Ankle dorsiflexion: nt Ankle dorsiflexion: nt   Ankle plantarflexion: nt Ankle plantarflexion: nt        Kaylee received therapeutic exercises to develop strength and endurance for 40  minutes including:     Supine B hip adduction 10 x 5 with a ball   Supine B hip abduction 10 x 5 with an orange TB   Bridges with isometric hip abd 2 x 10   Ambulated ~300 ft x 2 with no AD, Supervision   Seated hip flexion 10 x 5    Seated  "knee extension 10 x 5  Not performed:    Seated HS curls with orange TB 10 x 2 R & L  Standing rows 10 x 2   Gastroc stretch on slant 2x30 sec  Standing toe raises 1x10 with 3 sec holds  Sit to stands form elevated hi/low table 10 x 2   Recumbent Bike 6 min, no resistance. - (Pt reported that she is worried about irritating her knee with this activity.)     Kaylee participated in neuromuscular re-education activities to improve: Balance for 15  minutes. The following activities were included:     -Step overs a 2 inch step, unilateral UE support, CGA: 10 x 1 B  -step over 3" lary 10 x 1 B  -Side stepping 20 x 2 at the wall  Foam:  Static standing, no UE support, eyes closed, CGA/min A: 30 sec x 2  Static standing, no UE support, eyes open with horizontal head turns, CGA: 30 sec x 2     Step ups on foam, no UE support, CGA: 10 x 1 B - NP           Pt received an Ice pack to her R knee post Tx.  Home Exercises Provided and Patient Education Provided      Education provided:   - yes     Written Home Exercises Provided: yes.  Exercises were reviewed and Kaylee was able to demonstrate them prior to the end of the session.  Kaylee demonstrated good  understanding of the education provided.      See EMR under Patient Instructions for exercises provided 1/23/2020.     Assessment   Pt with improved tolerance to exercise and improved gait.  Her B LE strength has not shown improvement through a MMT, but her ability to perform standing balance activities and her improved gait are testaments to her overall improvement in balance and endurance.      aKylee is progressing well towards her goals.   Pt prognosis is Good.      Pt will continue to benefit from skilled outpatient physical therapy to address the deficits listed in the problem list box on initial evaluation, provide pt/family education and to maximize pt's level of independence in the home and community environment.      Pt's spiritual, cultural and educational needs " considered and pt agreeable to plan of care and goals.     Anticipated barriers to physical therapy: none     Goals:   Short Term Goals: 2 weeks   Pt will be independent in a HEP to address their functional deficits related to entrance into the Cardiac Rehabilitation program - progressing 3/3/2020     Long Term Goals: 4 weeks   Improve B LE MMT by 1/2 grade to show improvement in strength - progressing 3/3/2020  Pt able to tolerate walking 300 ft over a level surface without her cane - met 3/3/2020  Pt to have met criteria to enter a Cardiac Rehabilitation program - progressing 3/3/2020  Plan      Continue Physical Therapy treatments 2x/week for 4 weeks to prepare her to enter cardiac rehabilitation.      Landon Golden, PT

## 2020-03-13 ENCOUNTER — TELEPHONE (OUTPATIENT)
Dept: ORTHOPEDICS | Facility: CLINIC | Age: 75
End: 2020-03-13

## 2020-03-13 NOTE — TELEPHONE ENCOUNTER
----- Message from Karely Colin sent at 3/13/2020  9:16 AM CDT -----  Contact: pt  Patient would like to schedule an appt due to left ankle pain and being unable to walk.  Pt would only like to see Dr Grace    No appt available until 3/18 pt states she can't wait that long      Contact info  or

## 2020-03-15 ENCOUNTER — PATIENT OUTREACH (OUTPATIENT)
Dept: ADMINISTRATIVE | Facility: OTHER | Age: 75
End: 2020-03-15

## 2020-03-16 ENCOUNTER — HOSPITAL ENCOUNTER (OUTPATIENT)
Dept: RADIOLOGY | Facility: HOSPITAL | Age: 75
Discharge: HOME | End: 2020-03-16
Attending: ORTHOPAEDIC SURGERY
Payer: MEDICARE

## 2020-03-16 ENCOUNTER — OFFICE VISIT (OUTPATIENT)
Dept: ORTHOPEDICS | Facility: CLINIC | Age: 75
End: 2020-03-16
Payer: MEDICARE

## 2020-03-16 VITALS — BODY MASS INDEX: 41.81 KG/M2 | WEIGHT: 227.19 LBS | HEIGHT: 62 IN

## 2020-03-16 DIAGNOSIS — R52 PAIN: Primary | ICD-10-CM

## 2020-03-16 DIAGNOSIS — M76.821 POSTERIOR TIBIAL TENDINITIS OF RIGHT LEG: ICD-10-CM

## 2020-03-16 DIAGNOSIS — R52 PAIN: ICD-10-CM

## 2020-03-16 PROCEDURE — 99213 PR OFFICE/OUTPT VISIT, EST, LEVL III, 20-29 MIN: ICD-10-PCS | Mod: S$GLB,,, | Performed by: ORTHOPAEDIC SURGERY

## 2020-03-16 PROCEDURE — 1159F PR MEDICATION LIST DOCUMENTED IN MEDICAL RECORD: ICD-10-PCS | Mod: S$GLB,,, | Performed by: ORTHOPAEDIC SURGERY

## 2020-03-16 PROCEDURE — 73630 X-RAY EXAM OF FOOT: CPT | Mod: TC,LT

## 2020-03-16 PROCEDURE — 73610 X-RAY EXAM OF ANKLE: CPT | Mod: 26,LT,, | Performed by: RADIOLOGY

## 2020-03-16 PROCEDURE — 1126F AMNT PAIN NOTED NONE PRSNT: CPT | Mod: S$GLB,,, | Performed by: ORTHOPAEDIC SURGERY

## 2020-03-16 PROCEDURE — 99999 PR PBB SHADOW E&M-EST. PATIENT-LVL III: ICD-10-PCS | Mod: PBBFAC,,, | Performed by: ORTHOPAEDIC SURGERY

## 2020-03-16 PROCEDURE — 1101F PR PT FALLS ASSESS DOC 0-1 FALLS W/OUT INJ PAST YR: ICD-10-PCS | Mod: CPTII,S$GLB,, | Performed by: ORTHOPAEDIC SURGERY

## 2020-03-16 PROCEDURE — 1159F MED LIST DOCD IN RCRD: CPT | Mod: S$GLB,,, | Performed by: ORTHOPAEDIC SURGERY

## 2020-03-16 PROCEDURE — 73610 X-RAY EXAM OF ANKLE: CPT | Mod: TC,LT

## 2020-03-16 PROCEDURE — 1101F PT FALLS ASSESS-DOCD LE1/YR: CPT | Mod: CPTII,S$GLB,, | Performed by: ORTHOPAEDIC SURGERY

## 2020-03-16 PROCEDURE — 99999 PR PBB SHADOW E&M-EST. PATIENT-LVL III: CPT | Mod: PBBFAC,,, | Performed by: ORTHOPAEDIC SURGERY

## 2020-03-16 PROCEDURE — 73610 XR ANKLE COMPLETE 3 VIEW LEFT: ICD-10-PCS | Mod: 26,LT,, | Performed by: RADIOLOGY

## 2020-03-16 PROCEDURE — 73630 X-RAY EXAM OF FOOT: CPT | Mod: 26,LT,, | Performed by: RADIOLOGY

## 2020-03-16 PROCEDURE — 99213 OFFICE O/P EST LOW 20 MIN: CPT | Mod: S$GLB,,, | Performed by: ORTHOPAEDIC SURGERY

## 2020-03-16 PROCEDURE — 1126F PR PAIN SEVERITY QUANTIFIED, NO PAIN PRESENT: ICD-10-PCS | Mod: S$GLB,,, | Performed by: ORTHOPAEDIC SURGERY

## 2020-03-16 PROCEDURE — 73630 XR FOOT COMPLETE 3 VIEW LEFT: ICD-10-PCS | Mod: 26,LT,, | Performed by: RADIOLOGY

## 2020-03-16 NOTE — PROGRESS NOTES
Chart reviewed.   Immunizations: UPDATED  Orders placed: n/a  Upcoming appts to satisfy [facility] topics: Hemoglobin A1c 5/20

## 2020-03-16 NOTE — PLAN OF CARE
Physical Therapy Daily Treatment Note      Name: Kaylee Urena [facility] Number: [id number]     Therapy Diagnosis:        Encounter Diagnosis   Name Primary?    Physical debility        Physician: Liz Roberts, *     Visit Date: 3/10/2020  Physician Orders: PT Eval and Treat   Medical Diagnosis from Referral:   E11.22,N18.3,Z79.4 (ICD-10-CM) - Type 2 diabetes mellitus with stage 3 chronic kidney disease, with long-term current use of insulin   I25.10 (ICD-10-CM) - Coronary artery disease involving native coronary artery of native heart without angina pectoris   R53.81 (ICD-10-CM) - Physical deconditioning   R26.81 (ICD-10-CM) - Gait instability         Evaluation Date: 1/14/2020  Authorization Period Expiration: 12/30/2020  Plan of Care Expiration: 5/8/2020  Visit # / Visits authorized: 5/ 6     Time In: 2:05  Time Out: 3:00  Total Billable Time: 55 minutes     Precautions: Standard, Diabetes and CAD        Subjective      Pt reports:Pt states she is doing well, feels like she is improving some.   She was compliant with home exercise program.  Response to previous treatment: Pt reported that she felt ok after her last tx visit.  Functional change: none     Pain: 0/10  Location: n/a      Objective           Lower Extremity Strength  Right LE   Left LE     Knee extension: 4/5 Knee extension: 4/5   Knee flexion: 3/5 Knee flexion: 3/5   Hip flexion: 3/5 Hip flexion: 3/5   Hip extension:  2/5 Hip extension: 2/5   Hip abduction: 2/5 Hip abduction: 2/5   Hip adduction: 2/5 Hip adduction 2/5   Ankle dorsiflexion: nt Ankle dorsiflexion: nt   Ankle plantarflexion: nt Ankle plantarflexion: nt        Kaylee received therapeutic exercises to develop strength and endurance for 40  minutes including:     Supine B hip adduction 10 x 5 with a ball   Supine B hip abduction 10 x 5 with an orange TB   Bridges with isometric hip abd 2 x 10   Ambulated ~300 ft x 2 with no AD, Supervision   Seated hip flexion 10 x 5    Seated  "knee extension 10 x 5  Not performed:    Seated HS curls with orange TB 10 x 2 R & L  Standing rows 10 x 2   Gastroc stretch on slant 2x30 sec  Standing toe raises 1x10 with 3 sec holds  Sit to stands form elevated hi/low table 10 x 2   Recumbent Bike 6 min, no resistance. - (Pt reported that she is worried about irritating her knee with this activity.)     Kaylee participated in neuromuscular re-education activities to improve: Balance for 15  minutes. The following activities were included:     -Step overs a 2 inch step, unilateral UE support, CGA: 10 x 1 B  -step over 3" lary 10 x 1 B  -Side stepping 20 x 2 at the wall  Foam:  Static standing, no UE support, eyes closed, CGA/min A: 30 sec x 2  Static standing, no UE support, eyes open with horizontal head turns, CGA: 30 sec x 2     Step ups on foam, no UE support, CGA: 10 x 1 B - NP           Pt received an Ice pack to her R knee post Tx.  Home Exercises Provided and Patient Education Provided      Education provided:   - yes     Written Home Exercises Provided: yes.  Exercises were reviewed and Kaylee was able to demonstrate them prior to the end of the session.  Kaylee demonstrated good  understanding of the education provided.      See EMR under Patient Instructions for exercises provided 1/23/2020.     Assessment   Pt with improved tolerance to exercise and improved gait.  Her B LE strength has not shown improvement through a MMT, but her ability to perform standing balance activities and her improved gait are testaments to her overall improvement in balance and endurance.      Kaylee is progressing well towards her goals.   Pt prognosis is Good.      Pt will continue to benefit from skilled outpatient physical therapy to address the deficits listed in the problem list box on initial evaluation, provide pt/family education and to maximize pt's level of independence in the home and community environment.      Pt's spiritual, cultural and educational needs " considered and pt agreeable to plan of care and goals.     Anticipated barriers to physical therapy: none     Goals:   Short Term Goals: 2 weeks   Pt will be independent in a HEP to address their functional deficits related to entrance into the Cardiac Rehabilitation program - progressing 3/3/2020     Long Term Goals: 4 weeks   Improve B LE MMT by 1/2 grade to show improvement in strength - progressing 3/3/2020  Pt able to tolerate walking 300 ft over a level surface without her cane - met 3/3/2020  Pt to have met criteria to enter a Cardiac Rehabilitation program - progressing 3/3/2020  Plan      Continue Physical Therapy treatments 2x/week for 4 weeks to prepare her to enter cardiac rehabilitation.      Landon Golden, PT

## 2020-03-17 NOTE — PROGRESS NOTES
Kaylee Romero  Returns today for follow-up.  This is an established patient of mine who I have not seen in some time.  I performed surgery on her many years ago for posterior tibial tendon dysfunction.  The last time I saw her in clinic we had some discussion about possible triple arthrodesis but she reports that she has done well with her foot over the years with conservative measures.  She states, however, that 4 days ago she was getting up out of a cart and her foot gave out and she had some increased pain.  She states that her pain is improved today from the 4 days ago but wanted to come in and have evaluation.  She reports pain on the medial aspect of her left ankle and hindfoot in the region of her previous posterior tibial tendon surgery.    Examination:  She comes in a wheelchair.  There is some mild swelling medially along the course of the posterior tibial tendon.  She does have pes planus of the left foot.  She has active motion of her ankle and her subtalar joint but with some discomfort on inversion motion.  She has some mild pain on resistance to inversion of her foot.  Her hindfoot joints are fairly supple without any significant pain.  She is neurovascularly intact.    X-ray:  I ordered reviewed x-rays of left ankle and foot today and there are no acute bony injuries.  She has continued flatfoot deformity and her joints appear to be well maintained without any significant arthritis progression.    Impression:  Probable posterior tibial tendon strain left foot    Recommendation:  I suspect she may have aggravated her posterior tibial tendon.  It seems to be functioning at this time and her symptoms are improved from a few days ago.  I would suggest that she go into a short fracture boot for the next few weeks or until the symptoms resolved.    Follow-up in 4 weeks if symptoms are not improved

## 2020-03-20 ENCOUNTER — TELEPHONE (OUTPATIENT)
Dept: ORTHOPEDICS | Facility: CLINIC | Age: 75
End: 2020-03-20

## 2020-03-20 NOTE — TELEPHONE ENCOUNTER
----- Message from Agnes Kennedy RN sent at 3/19/2020  5:17 PM CDT -----  Manpreet pt says you gave her a card with your number but she lost it.  She is having issues with the short boot you fitted.  Plast is pressing against the medial side of her left ankle and would like to come by today for you to ck.  Please give her a call [phone] midmorning around 10am.    ----- Message -----  From: Rajwinder Ribera  Sent: 3/19/2020   2:14 PM CDT  To: Sanjay BAJWA Staff    Pt called stated she need help with her boot. She is having problems.  [phone]

## 2020-03-25 DIAGNOSIS — D64.9 NORMOCYTIC ANEMIA: ICD-10-CM

## 2020-03-25 RX ORDER — FERROUS SULFATE 325(65) MG
TABLET ORAL
Qty: 90 TABLET | Refills: 0 | Status: SHIPPED | OUTPATIENT
Start: 2020-03-25 | End: 2020-08-11

## 2020-03-27 ENCOUNTER — PATIENT OUTREACH (OUTPATIENT)
Dept: OTHER | Facility: OTHER | Age: 75
End: 2020-03-27

## 2020-03-27 NOTE — PROGRESS NOTES
Digital Medicine: Clinician Follow-Up    Called patient for hypertension and diabetes follow-up. Mrs. Page reports that things are going well. She and her  are self-isolating at home and staying safe. She reports that her glucometer is broken. Glucometer is no longer holding a charge even after leaving plugged in all night. Patient feels that device is defective and would like a replacement. As a result, she has not been able to submit a reading since 3/2/20. She would like for it to be mailed to her home. She is also requesting an order of diabetic test strips.     The history is provided by the patient and the spouse. No  was used.       INTERVENTION(S)  encouragement/support    PLAN  continue monitoring    Diabetes:  Last A1c of 6.7% meets goal of <7%.  Order to [facility] for diabetes test strips submitted  Glucometer broken - CRM to technical support for broken glucometer submitted.    Continue current regimen.    Hypertension:  Current 30-day BP avg of 127/81 is close to goal of <130/80.  Reviewed readings: SBP/DBP is trending downwards where both are intermittently controlled.     Continue current regimen.    Follow-up in 12 weeks or sooner if needed.      There are no preventive care reminders to display for this patient.    Last 5 Patient Entered Readings                                      Current 30 Day Average: 127/81     Recent Readings 3/25/2020 3/17/2020 3/2/2020 2/26/2020 2/26/2020    SBP (mmHg) 113 127 123 144 187    DBP (mmHg) 66 80 80 90 91    Pulse 77 81 73 75 76        Last 6 Patient Entered Readings                                          Most Recent A1c: 6.7% on 12/11/2019  (Goal: 8%)     Recent Readings 3/2/2020 2/1/2020 1/28/2020 1/25/2020 1/22/2020    Blood Glucose (mg/dL) 96 148 100 98 101           Hypertension Medications             amLODIPine (NORVASC) 10 MG tablet Take 1 tablet (10 mg total) by mouth once daily.    carvediloL (COREG) 12.5 MG tablet Take 2  tablets (25 mg total) by mouth 2 (two) times daily with meals.    chlorthalidone (HYGROTEN) 25 MG Tab Take 0.5 tablets (12.5 mg total) by mouth once daily.    nitroGLYCERIN (NITROSTAT) 0.4 MG SL tablet Place 1 tablet (0.4 mg total) under the tongue every 5 (five) minutes as needed for Chest pain.    valsartan (DIOVAN) 320 MG tablet Take 1 tablet (320 mg total) by mouth once daily.        Diabetes Medications             dulaglutide (TRULICITY) 1.5 mg/0.5 mL PnIj Inject 1.5 mg into the skin every 7 days.    glipiZIDE (GLUCOTROL) 10 MG tablet Take 1 tablet (10 mg total) by mouth daily with breakfast.    insulin (BASAGLAR KWIKPEN U-100 INSULIN) glargine 100 units/mL (3mL) SubQ pen Inject 15 Units into the skin once daily.               Screenings

## 2020-03-30 DIAGNOSIS — E11.22 TYPE 2 DIABETES MELLITUS WITH STAGE 3 CHRONIC KIDNEY DISEASE AND HYPERTENSION: Primary | ICD-10-CM

## 2020-03-30 DIAGNOSIS — I12.9 TYPE 2 DIABETES MELLITUS WITH STAGE 3 CHRONIC KIDNEY DISEASE AND HYPERTENSION: Primary | ICD-10-CM

## 2020-03-30 DIAGNOSIS — N18.30 TYPE 2 DIABETES MELLITUS WITH STAGE 3 CHRONIC KIDNEY DISEASE AND HYPERTENSION: Primary | ICD-10-CM

## 2020-04-02 DIAGNOSIS — E11.9 TYPE 2 DIABETES MELLITUS: ICD-10-CM

## 2020-04-09 NOTE — PROGRESS NOTES
4/9/20 425PM    Good Morning,    Patient has a glucometer that needs to be replaced. She has tried to troubleshoot with tech support as well as the [facility]. Nothing has worked and the device wasnt working while at the [facility]. Due to the COVID19 stay at home order and all [facility] being closed, the device will have to be sent to her.     Patient Name: Kaylee Romero    Contact Number: [phone]  Contact Email Address: [email or web address]   Shipping Address: [address]                                   [place] [address]    Exchange of equipment  Devices: Glucometer     Please feel free to reach out if you have any other questions,    Thanks,  Petrona Viramontes

## 2020-04-15 ENCOUNTER — PATIENT OUTREACH (OUTPATIENT)
Dept: OTHER | Facility: OTHER | Age: 75
End: 2020-04-15

## 2020-04-29 DIAGNOSIS — I11.9 HYPERTENSIVE HEART DISEASE WITHOUT HEART FAILURE: ICD-10-CM

## 2020-04-30 RX ORDER — CARVEDILOL 25 MG/1
25 TABLET ORAL 2 TIMES DAILY WITH MEALS
Qty: 180 TABLET | Refills: 3 | Status: SHIPPED | OUTPATIENT
Start: 2020-04-30 | End: 2020-06-15 | Stop reason: CLARIF

## 2020-05-01 ENCOUNTER — PATIENT MESSAGE (OUTPATIENT)
Dept: ENDOCRINOLOGY | Facility: CLINIC | Age: 75
End: 2020-05-01

## 2020-05-01 DIAGNOSIS — E11.22 TYPE 2 DIABETES MELLITUS WITH STAGE 3 CHRONIC KIDNEY DISEASE, WITH LONG-TERM CURRENT USE OF INSULIN: ICD-10-CM

## 2020-05-01 DIAGNOSIS — Z79.4 TYPE 2 DIABETES MELLITUS WITH STAGE 3 CHRONIC KIDNEY DISEASE, WITH LONG-TERM CURRENT USE OF INSULIN: ICD-10-CM

## 2020-05-01 DIAGNOSIS — N18.30 TYPE 2 DIABETES MELLITUS WITH STAGE 3 CHRONIC KIDNEY DISEASE, WITH LONG-TERM CURRENT USE OF INSULIN: ICD-10-CM

## 2020-05-03 RX ORDER — GLIPIZIDE 10 MG/1
TABLET ORAL
Qty: 90 TABLET | Refills: 0 | Status: SHIPPED | OUTPATIENT
Start: 2020-05-03 | End: 2020-08-09

## 2020-05-08 DIAGNOSIS — I10 ESSENTIAL HYPERTENSION: ICD-10-CM

## 2020-05-10 RX ORDER — VALSARTAN 320 MG/1
320 TABLET ORAL DAILY
Qty: 30 TABLET | Refills: 5 | Status: SHIPPED | OUTPATIENT
Start: 2020-05-10 | End: 2020-05-13 | Stop reason: RX

## 2020-05-11 ENCOUNTER — TELEPHONE (OUTPATIENT)
Dept: ENDOCRINOLOGY | Facility: CLINIC | Age: 75
End: 2020-05-11

## 2020-05-11 NOTE — PROGRESS NOTES
"Digital Medicine: Health  Follow-Up    Patient asked about having a virtual visit with Dr. Pritchard.  Informed patient Dr. Pritchard tried to call her to discuss a virtual visit.    The history is provided by the patient.     Follow Up  Follow-up reason(s): reading review      Readings are missing.   patient reminder needed.Patient reports she has been happy with her readings so she "slacked off." States she will take more readings this week.      INTERVENTION(S)  reviewed monitoring technique, encouragement/support and denied questions    PLAN  patient verbalizes understanding and continue monitoring      There are no preventive care reminders to display for this patient.    Last 5 Patient Entered Readings                                      Current 30 Day Average: 111/75     Recent Readings 5/1/2020 4/28/2020 4/26/2020 4/22/2020 4/17/2020    SBP (mmHg) 106 114 119 120 98    DBP (mmHg) 71 74 77 87 65    Pulse 74 90 76 85 91        Last 6 Patient Entered Readings                                          Most Recent A1c: 6.7% on 12/11/2019  (Goal: 8%)     Recent Readings 5/1/2020 4/28/2020 4/22/2020 4/17/2020 4/14/2020    Blood Glucose (mg/dL) 168 134 114 141 119                Screenings    SDOH  "

## 2020-05-12 ENCOUNTER — TELEPHONE (OUTPATIENT)
Dept: PRIMARY CARE CLINIC | Facility: CLINIC | Age: 75
End: 2020-05-12

## 2020-05-12 DIAGNOSIS — I10 ESSENTIAL HYPERTENSION: ICD-10-CM

## 2020-05-12 NOTE — TELEPHONE ENCOUNTER
----- Message from Elvia Hayes MA sent at 5/11/2020  3:57 PM CDT -----  Regarding: FW: Bookout      ----- Message -----  From: Shara Ingram  Sent: 5/11/2020   3:49 PM CDT  To: Armando Hill V Staff  Subject: Bookout                                          I called patient to barrett her appt. Patients states that she spoke with Evelyn on Friday and her appt has already been rescheduled. I don't see that she was rescheduled. Please advise.      Thank You

## 2020-05-13 RX ORDER — IRBESARTAN 300 MG/1
300 TABLET ORAL NIGHTLY
Qty: 30 TABLET | Refills: 3 | Status: SHIPPED | OUTPATIENT
Start: 2020-05-13 | End: 2020-08-10 | Stop reason: SDUPTHER

## 2020-05-15 ENCOUNTER — LAB VISIT (OUTPATIENT)
Dept: LAB | Facility: HOSPITAL | Age: 75
End: 2020-05-15
Payer: MEDICARE

## 2020-05-15 DIAGNOSIS — N18.30 TYPE 2 DIABETES MELLITUS WITH STAGE 3 CHRONIC KIDNEY DISEASE AND HYPERTENSION: ICD-10-CM

## 2020-05-15 DIAGNOSIS — E11.22 TYPE 2 DIABETES MELLITUS WITH STAGE 3 CHRONIC KIDNEY DISEASE AND HYPERTENSION: ICD-10-CM

## 2020-05-15 DIAGNOSIS — I12.9 TYPE 2 DIABETES MELLITUS WITH STAGE 3 CHRONIC KIDNEY DISEASE AND HYPERTENSION: ICD-10-CM

## 2020-05-15 DIAGNOSIS — E55.9 VITAMIN D DEFICIENCY: ICD-10-CM

## 2020-05-15 LAB
25(OH)D3+25(OH)D2 SERPL-MCNC: 39 NG/ML (ref 30–96)
ALBUMIN SERPL BCP-MCNC: 3.2 G/DL (ref 3.5–5.2)
ALP SERPL-CCNC: 88 U/L (ref 55–135)
ALT SERPL W/O P-5'-P-CCNC: 33 U/L (ref 10–44)
ANION GAP SERPL CALC-SCNC: 9 MMOL/L (ref 8–16)
AST SERPL-CCNC: 28 U/L (ref 10–40)
BILIRUB SERPL-MCNC: 0.5 MG/DL (ref 0.1–1)
BUN SERPL-MCNC: 15 MG/DL (ref 8–23)
CALCIUM SERPL-MCNC: 9 MG/DL (ref 8.7–10.5)
CHLORIDE SERPL-SCNC: 104 MMOL/L (ref 95–110)
CHOLEST SERPL-MCNC: 95 MG/DL (ref 120–199)
CHOLEST/HDLC SERPL: 2.3 {RATIO} (ref 2–5)
CO2 SERPL-SCNC: 24 MMOL/L (ref 23–29)
CREAT SERPL-MCNC: 1 MG/DL (ref 0.5–1.4)
EST. GFR  (AFRICAN AMERICAN): >60 ML/MIN/1.73 M^2
EST. GFR  (NON AFRICAN AMERICAN): 56 ML/MIN/1.73 M^2
ESTIMATED AVG GLUCOSE: 140 MG/DL (ref 68–131)
GLUCOSE SERPL-MCNC: 101 MG/DL (ref 70–110)
HBA1C MFR BLD HPLC: 6.5 % (ref 4–5.6)
HDLC SERPL-MCNC: 42 MG/DL (ref 40–75)
HDLC SERPL: 44.2 % (ref 20–50)
LDLC SERPL CALC-MCNC: 40.8 MG/DL (ref 63–159)
NONHDLC SERPL-MCNC: 53 MG/DL
POTASSIUM SERPL-SCNC: 4.1 MMOL/L (ref 3.5–5.1)
PROT SERPL-MCNC: 7.9 G/DL (ref 6–8.4)
SODIUM SERPL-SCNC: 137 MMOL/L (ref 136–145)
TRIGL SERPL-MCNC: 61 MG/DL (ref 30–150)

## 2020-05-15 PROCEDURE — 80053 COMPREHEN METABOLIC PANEL: CPT

## 2020-05-15 PROCEDURE — 83036 HEMOGLOBIN GLYCOSYLATED A1C: CPT

## 2020-05-15 PROCEDURE — 36415 COLL VENOUS BLD VENIPUNCTURE: CPT | Mod: PN

## 2020-05-15 PROCEDURE — 82306 VITAMIN D 25 HYDROXY: CPT

## 2020-05-15 PROCEDURE — 80061 LIPID PANEL: CPT

## 2020-05-18 ENCOUNTER — PATIENT MESSAGE (OUTPATIENT)
Dept: ADMINISTRATIVE | Facility: OTHER | Age: 75
End: 2020-05-18

## 2020-05-21 ENCOUNTER — OFFICE VISIT (OUTPATIENT)
Dept: PRIMARY CARE CLINIC | Facility: CLINIC | Age: 75
End: 2020-05-21
Payer: MEDICARE

## 2020-05-21 ENCOUNTER — IMMUNIZATION (OUTPATIENT)
Dept: PHARMACY | Facility: CLINIC | Age: 75
End: 2020-05-21
Payer: MEDICARE

## 2020-05-21 VITALS
SYSTOLIC BLOOD PRESSURE: 130 MMHG | OXYGEN SATURATION: 97 % | DIASTOLIC BLOOD PRESSURE: 80 MMHG | TEMPERATURE: 98 F | HEART RATE: 95 BPM | BODY MASS INDEX: 42.6 KG/M2 | WEIGHT: 231.5 LBS | HEIGHT: 62 IN

## 2020-05-21 DIAGNOSIS — E11.22 TYPE 2 DIABETES MELLITUS WITH STAGE 3 CHRONIC KIDNEY DISEASE, WITH LONG-TERM CURRENT USE OF INSULIN: Primary | ICD-10-CM

## 2020-05-21 DIAGNOSIS — I11.9 HYPERTENSIVE HEART DISEASE WITHOUT HEART FAILURE: ICD-10-CM

## 2020-05-21 DIAGNOSIS — I25.10 CORONARY ARTERY DISEASE INVOLVING NATIVE CORONARY ARTERY OF NATIVE HEART WITHOUT ANGINA PECTORIS: ICD-10-CM

## 2020-05-21 DIAGNOSIS — R19.7 DIARRHEA, UNSPECIFIED TYPE: ICD-10-CM

## 2020-05-21 DIAGNOSIS — N18.30 TYPE 2 DIABETES MELLITUS WITH STAGE 3 CHRONIC KIDNEY DISEASE, WITH LONG-TERM CURRENT USE OF INSULIN: Primary | ICD-10-CM

## 2020-05-21 DIAGNOSIS — M81.0 OSTEOPOROSIS WITHOUT CURRENT PATHOLOGICAL FRACTURE, UNSPECIFIED OSTEOPOROSIS TYPE: ICD-10-CM

## 2020-05-21 DIAGNOSIS — Z23 NEED FOR PNEUMOCOCCAL VACCINE: ICD-10-CM

## 2020-05-21 DIAGNOSIS — Z79.4 TYPE 2 DIABETES MELLITUS WITH STAGE 3 CHRONIC KIDNEY DISEASE, WITH LONG-TERM CURRENT USE OF INSULIN: Primary | ICD-10-CM

## 2020-05-21 PROCEDURE — 3044F HG A1C LEVEL LT 7.0%: CPT | Mod: CPTII,S$GLB,, | Performed by: INTERNAL MEDICINE

## 2020-05-21 PROCEDURE — 1101F PR PT FALLS ASSESS DOC 0-1 FALLS W/OUT INJ PAST YR: ICD-10-PCS | Mod: CPTII,S$GLB,, | Performed by: INTERNAL MEDICINE

## 2020-05-21 PROCEDURE — 1101F PT FALLS ASSESS-DOCD LE1/YR: CPT | Mod: CPTII,S$GLB,, | Performed by: INTERNAL MEDICINE

## 2020-05-21 PROCEDURE — 99999 PR PBB SHADOW E&M-EST. PATIENT-LVL III: CPT | Mod: PBBFAC,,, | Performed by: INTERNAL MEDICINE

## 2020-05-21 PROCEDURE — 1159F PR MEDICATION LIST DOCUMENTED IN MEDICAL RECORD: ICD-10-PCS | Mod: S$GLB,,, | Performed by: INTERNAL MEDICINE

## 2020-05-21 PROCEDURE — 3044F PR MOST RECENT HEMOGLOBIN A1C LEVEL <7.0%: ICD-10-PCS | Mod: CPTII,S$GLB,, | Performed by: INTERNAL MEDICINE

## 2020-05-21 PROCEDURE — 1159F MED LIST DOCD IN RCRD: CPT | Mod: S$GLB,,, | Performed by: INTERNAL MEDICINE

## 2020-05-21 PROCEDURE — 3075F PR MOST RECENT SYSTOLIC BLOOD PRESS GE 130-139MM HG: ICD-10-PCS | Mod: CPTII,S$GLB,, | Performed by: INTERNAL MEDICINE

## 2020-05-21 PROCEDURE — 3079F DIAST BP 80-89 MM HG: CPT | Mod: CPTII,S$GLB,, | Performed by: INTERNAL MEDICINE

## 2020-05-21 PROCEDURE — 1125F PR PAIN SEVERITY QUANTIFIED, PAIN PRESENT: ICD-10-PCS | Mod: S$GLB,,, | Performed by: INTERNAL MEDICINE

## 2020-05-21 PROCEDURE — 99214 OFFICE O/P EST MOD 30 MIN: CPT | Mod: S$GLB,,, | Performed by: INTERNAL MEDICINE

## 2020-05-21 PROCEDURE — 99999 PR PBB SHADOW E&M-EST. PATIENT-LVL III: ICD-10-PCS | Mod: PBBFAC,,, | Performed by: INTERNAL MEDICINE

## 2020-05-21 PROCEDURE — 99214 PR OFFICE/OUTPT VISIT, EST, LEVL IV, 30-39 MIN: ICD-10-PCS | Mod: S$GLB,,, | Performed by: INTERNAL MEDICINE

## 2020-05-21 PROCEDURE — 3075F SYST BP GE 130 - 139MM HG: CPT | Mod: CPTII,S$GLB,, | Performed by: INTERNAL MEDICINE

## 2020-05-21 PROCEDURE — 1125F AMNT PAIN NOTED PAIN PRSNT: CPT | Mod: S$GLB,,, | Performed by: INTERNAL MEDICINE

## 2020-05-21 PROCEDURE — 3079F PR MOST RECENT DIASTOLIC BLOOD PRESSURE 80-89 MM HG: ICD-10-PCS | Mod: CPTII,S$GLB,, | Performed by: INTERNAL MEDICINE

## 2020-05-21 RX ORDER — DIPHENOXYLATE HYDROCHLORIDE AND ATROPINE SULFATE 2.5; .025 MG/1; MG/1
1 TABLET ORAL 4 TIMES DAILY PRN
Qty: 20 TABLET | Refills: 0 | Status: SHIPPED | OUTPATIENT
Start: 2020-05-21 | End: 2021-01-24

## 2020-05-21 RX ORDER — AMLODIPINE BESYLATE 10 MG/1
10 TABLET ORAL DAILY
Qty: 90 TABLET | Refills: 1 | Status: SHIPPED | OUTPATIENT
Start: 2020-05-21 | End: 2020-09-27 | Stop reason: SDUPTHER

## 2020-05-21 RX ORDER — ALENDRONATE SODIUM 70 MG/1
70 TABLET ORAL
Qty: 12 TABLET | Refills: 3 | Status: SHIPPED | OUTPATIENT
Start: 2020-05-21 | End: 2021-04-29
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
## 2020-05-26 ENCOUNTER — TELEPHONE (OUTPATIENT)
Dept: ENDOCRINOLOGY | Facility: CLINIC | Age: 75
End: 2020-05-26

## 2020-05-26 ENCOUNTER — PATIENT OUTREACH (OUTPATIENT)
Dept: ADMINISTRATIVE | Facility: OTHER | Age: 75
End: 2020-05-26

## 2020-05-26 DIAGNOSIS — Z12.11 ENCOUNTER FOR FIT (FECAL IMMUNOCHEMICAL TEST) SCREENING: Primary | ICD-10-CM

## 2020-05-27 ENCOUNTER — OFFICE VISIT (OUTPATIENT)
Dept: ENDOCRINOLOGY | Facility: CLINIC | Age: 75
End: 2020-05-27
Payer: MEDICARE

## 2020-05-27 ENCOUNTER — PATIENT MESSAGE (OUTPATIENT)
Dept: ENDOCRINOLOGY | Facility: CLINIC | Age: 75
End: 2020-05-27

## 2020-05-27 DIAGNOSIS — M81.0 OSTEOPOROSIS WITHOUT CURRENT PATHOLOGICAL FRACTURE, UNSPECIFIED OSTEOPOROSIS TYPE: ICD-10-CM

## 2020-05-27 DIAGNOSIS — E55.9 VITAMIN D DEFICIENCY: ICD-10-CM

## 2020-05-27 DIAGNOSIS — E11.22 TYPE 2 DIABETES MELLITUS WITH STAGE 3 CHRONIC KIDNEY DISEASE AND HYPERTENSION: Primary | ICD-10-CM

## 2020-05-27 DIAGNOSIS — N18.30 TYPE 2 DIABETES MELLITUS WITH STAGE 3 CHRONIC KIDNEY DISEASE AND HYPERTENSION: Primary | ICD-10-CM

## 2020-05-27 DIAGNOSIS — I12.9 TYPE 2 DIABETES MELLITUS WITH STAGE 3 CHRONIC KIDNEY DISEASE AND HYPERTENSION: Primary | ICD-10-CM

## 2020-05-27 PROCEDURE — 99442 PR PHYSICIAN TELEPHONE EVALUATION 11-20 MIN: CPT | Mod: 95,,, | Performed by: NURSE PRACTITIONER

## 2020-05-27 PROCEDURE — 99442 PR PHYSICIAN TELEPHONE EVALUATION 11-20 MIN: ICD-10-PCS | Mod: 95,,, | Performed by: NURSE PRACTITIONER

## 2020-05-27 NOTE — PROGRESS NOTES
Established Patient Telehealth Visit      This was a telemedicine visit which took place via telephone.       The reason for the telephone service rather than a virtual visit was: diabetes  o Technical down time or technical challenges during the visit time   o Patient did not have access to internet or smart phone.    o Patient refused to do a video visit but willing to have a phone conversation.        During the visit, I was located in [place] and the patient was located in [place] and I had access to and was able to review their medical record.     The patient has been informed that they have chosen to received care through the use of telemedicine. Telemedicine enables to healthcare providers at different locations to provide safe, effective, and convenient care through the use of technology.  There risks associated with use of telemedicine, including equipment failures.  They also understand that I cannot physically examine them.      Patient consented to the use of telemedicine in their medical care.    Patient verbally understands the risks and benefits of telemedicine as explained.      All questions regarding telemedicine were answered.    Reason for call:  diabetes     HPI:   With regards to the diabetes:     Was seen in the hospital by KAVON Pritchadr NP in 9/2019 because of a Surgical Procedure and Date: CABG 8/12/19  He started her on levemir daily.      Diagnosed: 2013  Known complications:  DKA-  RN-  PN-  Nephropathy-     Current regimen:  Trulicity 1.5 mg weekly  Glipizide 10 mg when she wakes up (not with food)  Basaglar 15u HS     Other medications tried:  Jardiance     Glucose Monitor:  1 times a day testing  Log reviewed: yes, digital medicine program           Diet/Exercise:  Eats 2 meals a day.   Snacks: none   Drinks: water  Exercise - tries to stay active      Hypoglycemia:  denies  Knows how to correct with 15 grams of carbs- juice, coke, or a peppermint.      Education - last visit:  none  Eye Exam: 2/25/19  Podiatry: 8/29/19  Denies history of pancreatitis & personal/family history of medullary thyroid cancer.     Diabetes Management Status  Statin: Taking  ACE/ARB: Taking  Screening or Prevention Patient's value Goal Complete/Controlled?   HgA1C Testing and Control   Lab Results   Component Value Date    HGBA1C 6.5 (H) 05/15/2020    Annually/Less than 8% Yes   Lipid profile : 05/15/2020 Annually Yes   LDL control Lab Results   Component Value Date    LDLCALC 40.8 (L) 05/15/2020    Annually/Less than 100 mg/dl  Yes   Nephropathy screening Lab Results   Component Value Date    LABMICR 188.0 10/24/2017     Lab Results   Component Value Date    PROTEINUA 1+ (A) 08/28/2019    Annually Yes   Blood pressure BP Readings from Last 1 Encounters:   05/21/20 130/80    Less than 140/90 Yes   Dilated retinal exam : 06/27/2019 Annually Yes   Foot exam   : 10/21/2019 Annually Yes     With regards to osteoporosis:   BMD 3/2017  Osteoporosis of the left femoral neck.  Normal bone mineral density of the lumbar spine.  Ten year probability of major osteoporotic fracture is 9.5%, and hip fracture is 2.1%.  BMD 5/23/19       COMPARISON:  Comparison study done on 03/15/2013. Lumbar spine BMD 1.138 g/cm2 and the T-score 0.8.  The Total Hip BMD 0.928 g/cm2 and the T-score -0.1.    FINDINGS:  Lumbar Spine: Lumbar bone mineral density L1-L4 is 1.069g/cm2, which is a T-score of 0.2. The Z-score is 1.7.    Total Hip: Total hip bone mineral density is 0.808g/cm2.  The T-score is -1.1, and the Z-score is -0.2.    Femoral neck: Bone mineral density is 0.576g/cm2 and the T-score is -2.5 and the Z-score is -1.1 g/cm2.    There is a 6.7% risk of a major osteoporotic fracture and a 1.6% risk of hip fracture in the next 10 years (FRAX).    Compared with previous DXA, BMD at the lumbar spine has decreased by -6%, and the BMD at the total hip has decreased by -12.9%.       Impression       Osteoporosis of the femoral  neck.  RECOMMENDATIONS of [facility]:  1.  Calcium 1784-3875 mg daily and vitamin D 800-1000 units daily, adequate exercise.  2.  Consider bisphosphonates (alendronate, risedronate, ibandronate, zolendronic acid) or denosumab.  3.  Repeat BMD in 2 years      Current meds: Fosamax  Started: 6/2019     Calcium intake?  1000 mg a day   Vit D intake?  2000 iu a day   Weight bearing exercise?   Walking   Recent falls? August 2018; September 2018 - no fractures and no falls from a standing position.       They have made fall precautions in house      No recent fractures   No significant height loss (>2 inches)     tob use ?  None  etoh use? None     Family history: none     No current diarrhea or h/o malabsorption     Menopause at age 56     Denies chronic exposure to anticoagulants, proton pump inhibitors or antiseizure medications.   +Steroid injections knees     Denies GERD, indigestion, or gastric bypass surgery.      Denies history of thyroid disease, anemia, kidney stones or kidney disease.      Denies active malignancy, history of malignancy the involved the bone, prior radiation treatment, or unexplained elevations of alk phos on labs      With regards to the vitamin d deficiency:  currently taking otc 2000 iu a day    Vit D, 25-Hydroxy   Date Value Ref Range Status   05/15/2020 39 30 - 96 ng/mL Final     Comment:     Vitamin D deficiency.........<10 ng/mL                              Vitamin D insufficiency......10-29 ng/mL       Vitamin D sufficiency........> or equal to 30 ng/mL  Vitamin D toxicity............>100 ng/mL       No recent fractures         Current Outpatient Medications:     alcohol swabs (ALCOHOL PADS) PadM, Apply 1 each topically as needed., Disp: 11 each, Rfl: 11    alendronate (FOSAMAX) 70 MG tablet, Take 1 tablet (70 mg total) by mouth every 7 days. Take with a full glass of water & remain upright for at least 30 minutes, Disp: 12 tablet, Rfl: 3     amLODIPine (NORVASC) 10 MG tablet, Take 1 tablet (10 mg total) by mouth once daily., Disp: 90 tablet, Rfl: 1    aspirin 81 MG Chew, Take 1 tablet (81 mg total) by mouth once daily., Disp: , Rfl: 0    atorvastatin (LIPITOR) 80 MG tablet, Take 1 tablet (80 mg total) by mouth once daily., Disp: 90 tablet, Rfl: 2    blood sugar diagnostic Strp, 1 strip by Misc.(Non-Drug; Combo Route) route once daily., Disp: 100 strip, Rfl: 3    carvediloL (COREG) 25 MG tablet, Take 1 tablet (25 mg total) by mouth 2 (two) times daily with meals., Disp: 180 tablet, Rfl: 3    chlorthalidone (HYGROTEN) 25 MG Tab, Take 0.5 tablets (12.5 mg total) by mouth once daily., Disp: 15 tablet, Rfl: 11    clopidogrel (PLAVIX) 75 mg tablet, Take 1 tablet (75 mg total) by mouth once daily., Disp: 30 tablet, Rfl: 10    diphenoxylate-atropine 2.5-0.025 mg (LOMOTIL) 2.5-0.025 mg per tablet, Take 1 tablet by mouth 4 (four) times daily as needed for Diarrhea., Disp: 20 tablet, Rfl: 0    dulaglutide (TRULICITY) 1.5 mg/0.5 mL PnIj, Inject 1.5 mg into the skin every 7 days., Disp: 4 Syringe, Rfl: 5    ferrous sulfate (FEOSOL) 325 mg (65 mg iron) Tab tablet, TAKE 1 TABLET BY MOUTH ONCE DAILY, Disp: 90 tablet, Rfl: 0    flash glucose scanning reader Misc, Use as directed., Disp: 1 each, Rfl: 0    flash glucose sensor Kit, 1 Device by Misc.(Non-Drug; Combo Route) route every 14 (fourteen) days., Disp: 2 kit, Rfl: 5    glipiZIDE (GLUCOTROL) 10 MG tablet, TAKE 1 TABLET(10 MG) BY MOUTH DAILY WITH BREAKFAST, Disp: 90 tablet, Rfl: 0    insulin (BASAGLAR KWIKPEN U-100 INSULIN) glargine 100 units/mL (3mL) SubQ pen, Inject 15 Units into the skin once daily., Disp: 3 Box, Rfl: 11    irbesartan (AVAPRO) 300 MG tablet, Take 1 tablet (300 mg total) by mouth every evening., Disp: 30 tablet, Rfl: 3    lancets Misc, 1 lancet by Misc.(Non-Drug; Combo Route) route once daily., Disp: 100 each, Rfl: 3    multivitamin (ONE DAILY MULTIVITAMIN) per tablet, Take 1  "tablet by mouth once daily., Disp: , Rfl:     nitroGLYCERIN (NITROSTAT) 0.4 MG SL tablet, Place 1 tablet (0.4 mg total) under the tongue every 5 (five) minutes as needed for Chest pain., Disp: 30 tablet, Rfl: 1    pen needle, diabetic 31 gauge x 5/16" Ndle, Use every evening., Disp: 100 each, Rfl: 11    Assessment and plan:   1. Type 2 diabetes mellitus with stage 3 chronic kidney disease and hypertension  Hemoglobin A1C    Comprehensive metabolic panel   2. Osteoporosis without current pathological fracture, unspecified osteoporosis type     3. Vitamin D deficiency         Type 2 diabetes mellitus with stage 3 chronic kidney disease and hypertension  -- Labs prior  -- A1c below goal of 7% without hypoglycemia.   -- Medication Changes:   Continue current regimen, ensured to take glipizide with meals. May discontinue at her next visit.   -- Reviewed goals of therapy are to get the best control we can without hypoglycemia  -- Insulin injection sites and proper rotation instructed.    -- Advised frequent self blood glucose monitoring.  Patient encouraged to document glucose results and bring them to every clinic visit.  -- Hypoglycemia precautions discussed. Instructed on precautions before driving.    -- Call for Bg repeatedly < 90 or > 180.   -- Close adherence to lifestyle changes recommended.   -- Periodic follow ups for eye evaluations, foot care and dental care suggested. UTD    Osteoporosis without current pathological fracture  -- Continue fosamax weekly  -- Reviewed basics of quantity versus quality  -- Reassuring she is not fracturing   -- RDA of calcium (5890-6506 mg /day in divided doses) and vitamin D 2000iu a day  discussed  -- Calcium from food sources preferred  -- Fall precautions emphasized  -- +weight bearing exercise  -- [employer] website information given  -- Pharmacological therapy is recommended for patients with osteopenia if the 10 year probability of a hip fracture is >3% and 10 year " probability of other major osteoporotic fractures is >20%.   -- Treatment options and potential side effects discussed for PO bisphosphonates, reclast, Denosumab, and Teriparatide.  -- Low risk for ONJ and atypical fractures reviewed and discussed. Alerted that if dental work needs to be done it should be done prior to initiating therapy   -- Discussed optimal duration of bisphosphonate use is unknown - drug holiday after 5-10 po versus drug hold ay after 3 reclast treatment   -- Repeat DEXA scan in 2021    Vitamin D deficiency  Continue vit d daily      The session was 11 minutes of medical discussion.          This service was not originating from a related E/M service provided within the previous 7 days nor did  to an E/M service or procedure within the next 24 hours or my soonest available appointment.  Prevailing standard of care was able to be met in this audio-only visit.

## 2020-05-27 NOTE — ASSESSMENT & PLAN NOTE
-- Labs prior  -- A1c below goal of 7% without hypoglycemia.   -- Medication Changes:   Continue current regimen, ensured to take glipizide with meals. May discontinue at her next visit.   -- Reviewed goals of therapy are to get the best control we can without hypoglycemia  -- Insulin injection sites and proper rotation instructed.    -- Advised frequent self blood glucose monitoring.  Patient encouraged to document glucose results and bring them to every clinic visit.  -- Hypoglycemia precautions discussed. Instructed on precautions before driving.    -- Call for Bg repeatedly < 90 or > 180.   -- Close adherence to lifestyle changes recommended.   -- Periodic follow ups for eye evaluations, foot care and dental care suggested. UTD

## 2020-05-27 NOTE — ASSESSMENT & PLAN NOTE
-- Continue fosamax weekly  -- Reviewed basics of quantity versus quality  -- Reassuring she is not fracturing   -- RDA of calcium (2111-0545 mg /day in divided doses) and vitamin D 2000iu a day  discussed  -- Calcium from food sources preferred  -- Fall precautions emphasized  -- +weight bearing exercise  -- [employer] website information given  -- Pharmacological therapy is recommended for patients with osteopenia if the 10 year probability of a hip fracture is >3% and 10 year probability of other major osteoporotic fractures is >20%.   -- Treatment options and potential side effects discussed for PO bisphosphonates, reclast, Denosumab, and Teriparatide.  -- Low risk for ONJ and atypical fractures reviewed and discussed. Alerted that if dental work needs to be done it should be done prior to initiating therapy   -- Discussed optimal duration of bisphosphonate use is unknown - drug holiday after 5-10 po versus drug hold ay after 3 reclast treatment   -- Repeat DEXA scan in 2021

## 2020-06-10 ENCOUNTER — TELEPHONE (OUTPATIENT)
Dept: ENDOCRINOLOGY | Facility: CLINIC | Age: 75
End: 2020-06-10

## 2020-06-10 NOTE — TELEPHONE ENCOUNTER
----- Message from Shari Granado sent at 6/10/2020  9:24 AM CDT -----  Contact: Allyn simpson/ [facility]. Program tel: [phone] speak to anyone  Has some questions to discuss with the nurse .    Refused to comment further.    Pls call.

## 2020-06-10 NOTE — TELEPHONE ENCOUNTER
Called [facility] and no one was answer on phone . Phone was on hold for more 30 minutes.

## 2020-06-12 ENCOUNTER — TELEPHONE (OUTPATIENT)
Dept: PRIMARY CARE CLINIC | Facility: CLINIC | Age: 75
End: 2020-06-12

## 2020-06-12 ENCOUNTER — PATIENT OUTREACH (OUTPATIENT)
Dept: ADMINISTRATIVE | Facility: OTHER | Age: 75
End: 2020-06-12

## 2020-06-12 NOTE — TELEPHONE ENCOUNTER
Pt c/o  SOB, and low endurance no other symptoms this has been going on right after the last visit with you. Pt is also concerned about hair loss and what can be done about the hair loss

## 2020-06-12 NOTE — TELEPHONE ENCOUNTER
Seen 3 weeks ago, did not report SOB.   Shortness of breath requires a visit, straight to ER if severe.

## 2020-06-12 NOTE — PROGRESS NOTES
Chart reviewed.   Immunizations: updated  Orders placed: n/a  Upcoming appts to satisfy [facility] topics: Hemoglobin A1c 9/21

## 2020-06-12 NOTE — TELEPHONE ENCOUNTER
----- Message from Hawk Delarosa sent at 6/12/2020 11:06 AM CDT -----  Contact: self    Would like to get medical advice.  Symptoms (please be specific):  Sob if she is doing something  lack of energy loss of hair   How long has patient had these symptoms:  A while now   Pharmacy name and phone #:  [id number] [id number] [place] [address] AT [facility] [phone] (Phone)  [phone] (Fax)  Any drug allergies (copy from chart):      Would the patient rather a call back or a response via [facility]?:  Call back   Comments:

## 2020-06-15 ENCOUNTER — OFFICE VISIT (OUTPATIENT)
Dept: CARDIOLOGY | Facility: CLINIC | Age: 75
End: 2020-06-15
Payer: MEDICARE

## 2020-06-15 VITALS
HEART RATE: 83 BPM | WEIGHT: 231.25 LBS | HEIGHT: 62 IN | SYSTOLIC BLOOD PRESSURE: 124 MMHG | DIASTOLIC BLOOD PRESSURE: 70 MMHG | BODY MASS INDEX: 42.55 KG/M2

## 2020-06-15 DIAGNOSIS — N18.30 TYPE 2 DIABETES MELLITUS WITH STAGE 3 CHRONIC KIDNEY DISEASE AND HYPERTENSION: ICD-10-CM

## 2020-06-15 DIAGNOSIS — I25.2 HISTORY OF MI (MYOCARDIAL INFARCTION): ICD-10-CM

## 2020-06-15 DIAGNOSIS — E66.01 MORBID OBESITY WITH BODY MASS INDEX (BMI) OF 40.0 OR HIGHER: ICD-10-CM

## 2020-06-15 DIAGNOSIS — I12.9 TYPE 2 DIABETES MELLITUS WITH STAGE 3 CHRONIC KIDNEY DISEASE AND HYPERTENSION: ICD-10-CM

## 2020-06-15 DIAGNOSIS — R53.81 PHYSICAL DECONDITIONING: ICD-10-CM

## 2020-06-15 DIAGNOSIS — Z95.1 S/P CABG (CORONARY ARTERY BYPASS GRAFT): ICD-10-CM

## 2020-06-15 DIAGNOSIS — I21.4 NSTEMI (NON-ST ELEVATED MYOCARDIAL INFARCTION): ICD-10-CM

## 2020-06-15 DIAGNOSIS — I25.10 CORONARY ARTERY DISEASE INVOLVING NATIVE CORONARY ARTERY OF NATIVE HEART WITHOUT ANGINA PECTORIS: ICD-10-CM

## 2020-06-15 DIAGNOSIS — G47.33 OSA ON CPAP: ICD-10-CM

## 2020-06-15 DIAGNOSIS — E78.49 OTHER HYPERLIPIDEMIA: ICD-10-CM

## 2020-06-15 DIAGNOSIS — R06.02 SHORTNESS OF BREATH: Primary | ICD-10-CM

## 2020-06-15 DIAGNOSIS — I10 ESSENTIAL HYPERTENSION: ICD-10-CM

## 2020-06-15 DIAGNOSIS — E11.22 TYPE 2 DIABETES MELLITUS WITH STAGE 3 CHRONIC KIDNEY DISEASE AND HYPERTENSION: ICD-10-CM

## 2020-06-15 PROCEDURE — 99999 PR PBB SHADOW E&M-EST. PATIENT-LVL V: CPT | Mod: PBBFAC,,, | Performed by: NURSE PRACTITIONER

## 2020-06-15 PROCEDURE — 99214 OFFICE O/P EST MOD 30 MIN: CPT | Mod: S$GLB,,, | Performed by: NURSE PRACTITIONER

## 2020-06-15 PROCEDURE — 99499 UNLISTED E&M SERVICE: CPT | Mod: S$GLB,,, | Performed by: NURSE PRACTITIONER

## 2020-06-15 PROCEDURE — 99999 PR PBB SHADOW E&M-EST. PATIENT-LVL V: ICD-10-PCS | Mod: PBBFAC,,, | Performed by: NURSE PRACTITIONER

## 2020-06-15 PROCEDURE — 93000 EKG 12-LEAD: ICD-10-PCS | Mod: S$GLB,,, | Performed by: INTERNAL MEDICINE

## 2020-06-15 PROCEDURE — 93000 ELECTROCARDIOGRAM COMPLETE: CPT | Mod: S$GLB,,, | Performed by: INTERNAL MEDICINE

## 2020-06-15 PROCEDURE — 99499 RISK ADDL DX/OHS AUDIT: ICD-10-PCS | Mod: S$GLB,,, | Performed by: NURSE PRACTITIONER

## 2020-06-15 PROCEDURE — 99214 PR OFFICE/OUTPT VISIT, EST, LEVL IV, 30-39 MIN: ICD-10-PCS | Mod: S$GLB,,, | Performed by: NURSE PRACTITIONER

## 2020-06-15 RX ORDER — METOPROLOL TARTRATE 50 MG/1
50 TABLET ORAL 2 TIMES DAILY
Qty: 60 TABLET | Refills: 11 | Status: SHIPPED | OUTPATIENT
Start: 2020-06-15 | End: 2020-06-15

## 2020-06-15 RX ORDER — CHOLECALCIFEROL (VITAMIN D3) 25 MCG
1000 TABLET ORAL
COMMUNITY

## 2020-06-15 RX ORDER — METOPROLOL TARTRATE 50 MG/1
75 TABLET ORAL 2 TIMES DAILY
Qty: 90 TABLET | Refills: 11 | Status: SHIPPED | OUTPATIENT
Start: 2020-06-15 | End: 2020-07-08 | Stop reason: CLARIF

## 2020-06-15 RX ORDER — HYDRALAZINE HYDROCHLORIDE 25 MG/1
25 TABLET, FILM COATED ORAL 2 TIMES DAILY
Qty: 60 TABLET | Refills: 11 | Status: SHIPPED | OUTPATIENT
Start: 2020-06-15 | End: 2020-09-25

## 2020-06-15 NOTE — PATIENT INSTRUCTIONS
Stop chlorthalidone and stop carvedilol  Starting metoprolol 50mg 1.5 tablets  twice a day  Starting hydralazine 25mg twice a day    Maintain hydration
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
## 2020-06-24 NOTE — PROGRESS NOTES
6/24/20 chart review:  Patient saw cardiology Angela Tinsley NP on 6/15/20 where the following changes were made:    NSTEMI (non-ST elevated myocardial infarction)-s/p PCI 10/2019, EF 73%  -     metoprolol tartrate (LOPRESSOR) 50 MG tablet; Take 1.5 tablets (75 mg total) by mouth 2 (two) times daily.  Dispense: 90 tablet; Refill: 11        -continue DAPT,  high intensity statin     Switched carvedilol to metoprolol for rate lowering to increase preload.    Essential hypertension  -     hydrALAZINE (APRESOLINE) 25 MG tablet; Take 1 tablet (25 mg total) by mouth 2 (two) times a day.  Dispense: 60 tablet; Refill: 11  -continue CCB and ARB  -Stop chlorthalidone-concern for dehydration         -continue low Na diet         -continue Digital  HTN program         -continue nightly CPAP use

## 2020-06-25 ENCOUNTER — PATIENT OUTREACH (OUTPATIENT)
Dept: OTHER | Facility: OTHER | Age: 75
End: 2020-06-25

## 2020-07-06 ENCOUNTER — TELEPHONE (OUTPATIENT)
Dept: CARDIOLOGY | Facility: CLINIC | Age: 75
End: 2020-07-06

## 2020-07-06 ENCOUNTER — PATIENT OUTREACH (OUTPATIENT)
Dept: ADMINISTRATIVE | Facility: OTHER | Age: 75
End: 2020-07-06

## 2020-07-06 ENCOUNTER — PATIENT MESSAGE (OUTPATIENT)
Dept: ADMINISTRATIVE | Facility: HOSPITAL | Age: 75
End: 2020-07-06

## 2020-07-06 ENCOUNTER — TELEPHONE (OUTPATIENT)
Dept: PRIMARY CARE CLINIC | Facility: CLINIC | Age: 75
End: 2020-07-06

## 2020-07-06 NOTE — PROGRESS NOTES
Chart reviewed.   Immunizations: Triggered Imm Registry     Orders placed: n/a  Upcoming appts to satisfy [facility] topics: n/a

## 2020-07-06 NOTE — TELEPHONE ENCOUNTER
"Patient calling as follow up from last visit 6/15/20  had med change at that time she states because she complained thought her meds were causing hair loss. She started med change on 6/22, and states since started having chest pains described as "crushing" "stressed" feeling, "moderate discomfort", taking NTG with relief sometimes or otherwise will wait and it will go away, occurs late at night sitting at kitchen table. States last three nights did not take NTG. Not caused during exertion but states maybe after cooking or doing things will sit and feels the discomfort and is always late at night. Denies associated symptoms except maybe a little shortness of breath. Patient would like to go back to the medicine she was taking before. Routed to TAI Tinsley NP for review.    "

## 2020-07-06 NOTE — TELEPHONE ENCOUNTER
Pt c/o about hair loss they changed her medication at her request.  Pt state she has been having cardiac issues since she started medication for hair loss ( Hydralazine and metoprolol ) pt started taking new med on 6/22 and on  6/25 she started having pain in her chest it resolved.   Pt took a nitroglycerin. Pt will contact cardiology to schedule an appt

## 2020-07-06 NOTE — TELEPHONE ENCOUNTER
----- Message from Grisel Esparza sent at 7/6/2020  4:24 PM CDT -----  Regarding: Fit Kit  Contact: [place] [phone]  Patient would like to  FIT kit tomorrow.  Please call

## 2020-07-07 ENCOUNTER — DOCUMENTATION ONLY (OUTPATIENT)
Dept: PRIMARY CARE CLINIC | Facility: CLINIC | Age: 75
End: 2020-07-07

## 2020-07-07 ENCOUNTER — OFFICE VISIT (OUTPATIENT)
Dept: PODIATRY | Facility: CLINIC | Age: 75
End: 2020-07-07
Payer: MEDICARE

## 2020-07-07 VITALS
DIASTOLIC BLOOD PRESSURE: 63 MMHG | TEMPERATURE: 98 F | WEIGHT: 231 LBS | BODY MASS INDEX: 42.51 KG/M2 | SYSTOLIC BLOOD PRESSURE: 131 MMHG | HEART RATE: 85 BPM | HEIGHT: 62 IN

## 2020-07-07 DIAGNOSIS — B35.1 DERMATOPHYTOSIS OF NAIL: ICD-10-CM

## 2020-07-07 DIAGNOSIS — E11.49 TYPE II DIABETES MELLITUS WITH NEUROLOGICAL MANIFESTATIONS: Primary | ICD-10-CM

## 2020-07-07 PROCEDURE — 99999 PR PBB SHADOW E&M-EST. PATIENT-LVL V: CPT | Mod: PBBFAC,,, | Performed by: PODIATRIST

## 2020-07-07 PROCEDURE — 99999 PR PBB SHADOW E&M-EST. PATIENT-LVL V: ICD-10-PCS | Mod: PBBFAC,,, | Performed by: PODIATRIST

## 2020-07-07 PROCEDURE — 11721 DEBRIDE NAIL 6 OR MORE: CPT | Mod: Q9,S$GLB,, | Performed by: PODIATRIST

## 2020-07-07 PROCEDURE — 11721 ROUTINE FOOT CARE: ICD-10-PCS | Mod: Q9,S$GLB,, | Performed by: PODIATRIST

## 2020-07-07 PROCEDURE — 99499 UNLISTED E&M SERVICE: CPT | Mod: S$GLB,,, | Performed by: PODIATRIST

## 2020-07-07 PROCEDURE — 99499 NO LOS: ICD-10-PCS | Mod: S$GLB,,, | Performed by: PODIATRIST

## 2020-07-07 NOTE — PROGRESS NOTES
Subjective:       Patient ID: Kaylee Romero is a 75 y.o. female.    Chief Complaint: Diabetes Mellitus (PCP: Liz Roberts 05/21/2020  A1C: 05/15/2020 / 6.5)        Kaylee Romero is a 75 y.o. female    has a past medical history of Acid reflux, Age-related osteoporosis without current pathological fracture, Cataract, CKD (chronic kidney disease) stage 3, GFR 30-59 ml/min, Dry mouth, History of TIA (transient ischemic attack), Hyperlipidemia, Hypertensive heart disease with diastolic heart failure, Morbid obesity with body mass index (BMI) of 40.0 or higher, KAMRAN (obstructive sleep apnea), KAMRAN on CPAP, Osteoporosis without current pathological fracture, Physical deconditioning, Status post total left knee replacement 5/1/2017, and Type 2 diabetes mellitus with stage 3 chronic kidney disease, without long-term current use of insulin.     Pt presents to clinic for diabetic foot exam. Pt relates she has fungal toe nails and dry skin on the feet.   She uses Gold [employer] for diabetics lotion.   No other pedal complaints at this time.         PCP:  Liz Roberts MD  Last visit:    Diabetes Mellitus (PCP: Liz Roberts 05/21/2020  A1C: 05/15/2020 / 6.5)               Past Medical History:   Diagnosis Date    Acid reflux     Age-related osteoporosis without current pathological fracture 1/11/2019    Cataract     CKD (chronic kidney disease) stage 3, GFR 30-59 ml/min     Dry mouth     History of TIA (transient ischemic attack) 12/11/2018    Hyperlipidemia 12/2/2014    Hypertensive heart disease with diastolic heart failure 11/28/2012    Morbid obesity with body mass index (BMI) of 40.0 or higher 5/9/2016    KAMRAN (obstructive sleep apnea)     KAMRAN on CPAP 6/28/2016    Osteoporosis without current pathological fracture 11/11/2018    Physical deconditioning 11/5/2018    Status post total left knee replacement 5/1/2017 5/16/2017    Type 2 diabetes mellitus with stage 3 chronic kidney disease,  without long-term current use of insulin 5/16/2019             Current Outpatient Medications on File Prior to Visit   Medication Sig Dispense Refill    alcohol swabs (ALCOHOL PADS) PadM Apply 1 each topically as needed. 11 each 11    alendronate (FOSAMAX) 70 MG tablet Take 1 tablet (70 mg total) by mouth every 7 days. Take with a full glass of water & remain upright for at least 30 minutes 12 tablet 3    amLODIPine (NORVASC) 10 MG tablet Take 1 tablet (10 mg total) by mouth once daily. 90 tablet 1    aspirin 81 MG Chew Take 1 tablet (81 mg total) by mouth once daily.  0    atorvastatin (LIPITOR) 80 MG tablet Take 1 tablet (80 mg total) by mouth once daily. 90 tablet 2    blood sugar diagnostic Strp 1 strip by Misc.(Non-Drug; Combo Route) route once daily. 100 strip 3    clopidogrel (PLAVIX) 75 mg tablet Take 1 tablet (75 mg total) by mouth once daily. 30 tablet 10    diphenoxylate-atropine 2.5-0.025 mg (LOMOTIL) 2.5-0.025 mg per tablet Take 1 tablet by mouth 4 (four) times daily as needed for Diarrhea. 20 tablet 0    dulaglutide (TRULICITY) 1.5 mg/0.5 mL PnIj Inject 1.5 mg into the skin every 7 days. 4 Syringe 5    ferrous sulfate (FEOSOL) 325 mg (65 mg iron) Tab tablet TAKE 1 TABLET BY MOUTH ONCE DAILY 90 tablet 0    glipiZIDE (GLUCOTROL) 10 MG tablet TAKE 1 TABLET(10 MG) BY MOUTH DAILY WITH BREAKFAST 90 tablet 0    hydrALAZINE (APRESOLINE) 25 MG tablet Take 1 tablet (25 mg total) by mouth 2 (two) times a day. 60 tablet 11    insulin (BASAGLAR KWIKPEN U-100 INSULIN) glargine 100 units/mL (3mL) SubQ pen Inject 15 Units into the skin once daily. 3 Box 11    insulin glargine,hum.rec.anlog (BASAGLAR KWIKPEN U-100 INSULIN SUBQ) Inject 15 Units into the skin once daily.      irbesartan (AVAPRO) 300 MG tablet Take 1 tablet (300 mg total) by mouth every evening. 30 tablet 3    lancets Misc 1 lancet by Misc.(Non-Drug; Combo Route) route once daily. 100 each 3    metoprolol tartrate (LOPRESSOR) 50 MG tablet  "Take 1.5 tablets (75 mg total) by mouth 2 (two) times daily. 90 tablet 11    multivitamin (ONE DAILY MULTIVITAMIN) per tablet Take 1 tablet by mouth once daily.      nitroGLYCERIN (NITROSTAT) 0.4 MG SL tablet Place 1 tablet (0.4 mg total) under the tongue every 5 (five) minutes as needed for Chest pain. 30 tablet 1    pen needle, diabetic 31 gauge x 5/16" Ndle Use every evening. 100 each 11    vitamin D (VITAMIN D3) 1000 units Tab Take 1,000 Units by mouth once daily.      flash glucose scanning reader Misc Use as directed. 1 each 0    flash glucose sensor Kit 1 Device by Misc.(Non-Drug; Combo Route) route every 14 (fourteen) days. 2 kit 5     No current facility-administered medications on file prior to visit.          Review of patient's allergies indicates:   Allergen Reactions    Keflex [cephalexin] Other (See Comments)     Turns orange    Bactrim [sulfamethoxazole-trimethoprim]      Generic version  Sulfa makes her sick         Social History     Socioeconomic History    Marital status:      Spouse name: Not on file    Number of children: 1    Years of education: Not on file    Highest education level: Not on file   Occupational History    Not on file   Social Needs    Financial resource strain: Not hard at all    Food insecurity     Worry: Never true     Inability: Never true    Transportation needs     Medical: No     Non-medical: No   Tobacco Use    Smoking status: Never Smoker    Smokeless tobacco: Never Used   Substance and Sexual Activity    Alcohol use: Yes     Alcohol/week: 1.0 standard drinks     Types: 1 Shots of liquor per week     Frequency: Monthly or less     Drinks per session: 1 or 2     Binge frequency: Never     Comment: rare    Drug use: No    Sexual activity: Yes     Partners: Male     Birth control/protection: Post-menopausal   Lifestyle    Physical activity     Days per week: 0 days     Minutes per session: 0 min    Stress: Not at all   Relationships    " "Social connections     Talks on phone: More than three times a week     Gets together: Never     Attends [employer] service: Not on file     Active member of club or organization: No     Attends meetings of clubs or organizations: Never     Relationship status:    Other Topics Concern    Not on file   Social History Narrative     with a son living locally.  at [employer], Retired 5/18.           Review of Systems   Constitutional: Negative.    HENT: Negative.    Eyes: Negative.    Respiratory: Negative.    Cardiovascular: Negative.    Gastrointestinal: Negative.    Endocrine: Negative.    Genitourinary: Negative.    Musculoskeletal: Negative.    Skin: Negative.    Allergic/Immunologic: Negative.    Neurological: Negative.    Hematological: Negative.    Psychiatric/Behavioral: Negative.        Objective:        Vitals:    07/07/20 1421   BP: 131/63   Pulse: 85   Temp: 97.8 °F (36.6 °C)   Weight: 104.8 kg (231 lb)   Height: 5' 2" (1.575 m)         Physical Exam  Constitutional:       Appearance: She is well-developed.   Cardiovascular:      Comments: DP and PT pulses 2/4 loreta.   Edema noted loreta.   Capillary refill time < 3 seconds to digits 1-5, loreta.   Absent hair growth      Musculoskeletal: Normal range of motion.         General: Deformity present. No tenderness.      Comments: HAV noted to loreta 1st MPJ. 5/5 strength to all LE muscle groups. No POP noted     Skin:     Capillary Refill: Capillary refill takes 2 to 3 seconds.      Findings: No erythema.      Comments: Toenails x 10 are elongated by 1-3mm, thickened by 1-3mm and mycotic with subungual debris      Neurological:      Mental Status: She is alert and oriented to person, place, and time.      Comments: +paresthesias (Abnormal spontaneous sensations in feet)     Psychiatric:         Behavior: Behavior normal.         Thought Content: Thought content normal.         Judgment: Judgment normal.                 Assessment:    "    Problem List Items Addressed This Visit     None      Visit Diagnoses     Type II diabetes mellitus with neurological manifestations    -  Primary    Dermatophytosis of nail                Plan:              -I counseled the pt on her condition and management    -Shoe inspection. Diabetic Foot Education. Patient reminded of the importance of good nutrition and blood sugar control to help prevent podiatric complications of diabetes. Patient instructed on proper foot hygeine. We discussed wearing proper shoe gear, daily foot inspections, never walking without protective shoe gear    -Continue Gold bond lotion for diabetics for dry skin on the feet.  Using daily.       Routine Foot Care    Date/Time: 7/7/2020 2:15 PM  Performed by: Donna Gillis DPM  Authorized by: Donna Gillis DPM     Consent Done?:  Yes (Verbal)    Nail Care Type:  Debride  Location(s): All  (Left 1st Toe, Left 3rd Toe, Left 2nd Toe, Left 4th Toe, Left 5th Toe, Right 1st Toe, Right 2nd Toe, Right 3rd Toe, Right 4th Toe and Right 5th Toe)  Patient tolerance:  Patient tolerated the procedure well with no immediate complications     With patient's permission, the toenails mentioned above were aggressively reduced and debrided using a nail nipper, removing all offending nail and debris. The patient will continue to monitor the areas daily, inspect the feet, wear protective shoe gear when ambulatory, and moisturizer to maintain skin integrity.         No follow-ups on file.          Donna Gillis DPM  NPI: [id number]         [facility]  [address] [address]  Dept: [phone]  Dept Fax: [phone]              Donna Gillis DPM  NPI: [id number]         [facility]  [address] [address]  Dept: [phone]  Dept Fax: [phone]

## 2020-07-08 ENCOUNTER — OFFICE VISIT (OUTPATIENT)
Dept: CARDIOLOGY | Facility: CLINIC | Age: 75
End: 2020-07-08
Payer: MEDICARE

## 2020-07-08 VITALS
OXYGEN SATURATION: 98 % | DIASTOLIC BLOOD PRESSURE: 69 MMHG | WEIGHT: 235.25 LBS | BODY MASS INDEX: 41.68 KG/M2 | HEART RATE: 71 BPM | HEIGHT: 63 IN | SYSTOLIC BLOOD PRESSURE: 145 MMHG

## 2020-07-08 DIAGNOSIS — R06.02 SHORTNESS OF BREATH: ICD-10-CM

## 2020-07-08 DIAGNOSIS — I25.10 CORONARY ARTERY DISEASE INVOLVING NATIVE CORONARY ARTERY OF NATIVE HEART WITHOUT ANGINA PECTORIS: ICD-10-CM

## 2020-07-08 DIAGNOSIS — I10 ESSENTIAL HYPERTENSION: ICD-10-CM

## 2020-07-08 DIAGNOSIS — I12.9 TYPE 2 DIABETES MELLITUS WITH STAGE 3 CHRONIC KIDNEY DISEASE AND HYPERTENSION: ICD-10-CM

## 2020-07-08 DIAGNOSIS — E78.49 OTHER HYPERLIPIDEMIA: ICD-10-CM

## 2020-07-08 DIAGNOSIS — N18.30 CKD (CHRONIC KIDNEY DISEASE) STAGE 3, GFR 30-59 ML/MIN: ICD-10-CM

## 2020-07-08 DIAGNOSIS — E11.22 TYPE 2 DIABETES MELLITUS WITH STAGE 3 CHRONIC KIDNEY DISEASE AND HYPERTENSION: ICD-10-CM

## 2020-07-08 DIAGNOSIS — I25.2 HISTORY OF MI (MYOCARDIAL INFARCTION): ICD-10-CM

## 2020-07-08 DIAGNOSIS — N18.30 TYPE 2 DIABETES MELLITUS WITH STAGE 3 CHRONIC KIDNEY DISEASE AND HYPERTENSION: ICD-10-CM

## 2020-07-08 DIAGNOSIS — R07.89 OTHER CHEST PAIN: Primary | ICD-10-CM

## 2020-07-08 DIAGNOSIS — G47.33 OSA ON CPAP: ICD-10-CM

## 2020-07-08 DIAGNOSIS — E66.01 MORBID OBESITY WITH BODY MASS INDEX (BMI) OF 40.0 OR HIGHER: ICD-10-CM

## 2020-07-08 DIAGNOSIS — Z95.1 S/P CABG (CORONARY ARTERY BYPASS GRAFT): ICD-10-CM

## 2020-07-08 PROCEDURE — 3077F PR MOST RECENT SYSTOLIC BLOOD PRESSURE >= 140 MM HG: ICD-10-PCS | Mod: CPTII,S$GLB,, | Performed by: NURSE PRACTITIONER

## 2020-07-08 PROCEDURE — 99499 UNLISTED E&M SERVICE: CPT | Mod: S$GLB,,, | Performed by: NURSE PRACTITIONER

## 2020-07-08 PROCEDURE — 3078F DIAST BP <80 MM HG: CPT | Mod: CPTII,S$GLB,, | Performed by: NURSE PRACTITIONER

## 2020-07-08 PROCEDURE — 3008F BODY MASS INDEX DOCD: CPT | Mod: CPTII,S$GLB,, | Performed by: NURSE PRACTITIONER

## 2020-07-08 PROCEDURE — 3044F PR MOST RECENT HEMOGLOBIN A1C LEVEL <7.0%: ICD-10-PCS | Mod: CPTII,S$GLB,, | Performed by: NURSE PRACTITIONER

## 2020-07-08 PROCEDURE — 1126F PR PAIN SEVERITY QUANTIFIED, NO PAIN PRESENT: ICD-10-PCS | Mod: S$GLB,,, | Performed by: NURSE PRACTITIONER

## 2020-07-08 PROCEDURE — 93000 ELECTROCARDIOGRAM COMPLETE: CPT | Mod: S$GLB,,, | Performed by: INTERNAL MEDICINE

## 2020-07-08 PROCEDURE — 3044F HG A1C LEVEL LT 7.0%: CPT | Mod: CPTII,S$GLB,, | Performed by: NURSE PRACTITIONER

## 2020-07-08 PROCEDURE — 1101F PR PT FALLS ASSESS DOC 0-1 FALLS W/OUT INJ PAST YR: ICD-10-PCS | Mod: CPTII,S$GLB,, | Performed by: NURSE PRACTITIONER

## 2020-07-08 PROCEDURE — 1101F PT FALLS ASSESS-DOCD LE1/YR: CPT | Mod: CPTII,S$GLB,, | Performed by: NURSE PRACTITIONER

## 2020-07-08 PROCEDURE — 1159F PR MEDICATION LIST DOCUMENTED IN MEDICAL RECORD: ICD-10-PCS | Mod: S$GLB,,, | Performed by: NURSE PRACTITIONER

## 2020-07-08 PROCEDURE — 99215 OFFICE O/P EST HI 40 MIN: CPT | Mod: 25,S$GLB,, | Performed by: NURSE PRACTITIONER

## 2020-07-08 PROCEDURE — 1159F MED LIST DOCD IN RCRD: CPT | Mod: S$GLB,,, | Performed by: NURSE PRACTITIONER

## 2020-07-08 PROCEDURE — 99215 PR OFFICE/OUTPT VISIT, EST, LEVL V, 40-54 MIN: ICD-10-PCS | Mod: 25,S$GLB,, | Performed by: NURSE PRACTITIONER

## 2020-07-08 PROCEDURE — 3008F PR BODY MASS INDEX (BMI) DOCUMENTED: ICD-10-PCS | Mod: CPTII,S$GLB,, | Performed by: NURSE PRACTITIONER

## 2020-07-08 PROCEDURE — 93000 EKG 12-LEAD: ICD-10-PCS | Mod: S$GLB,,, | Performed by: INTERNAL MEDICINE

## 2020-07-08 PROCEDURE — 1126F AMNT PAIN NOTED NONE PRSNT: CPT | Mod: S$GLB,,, | Performed by: NURSE PRACTITIONER

## 2020-07-08 PROCEDURE — 99999 PR PBB SHADOW E&M-EST. PATIENT-LVL V: CPT | Mod: PBBFAC,,, | Performed by: NURSE PRACTITIONER

## 2020-07-08 PROCEDURE — 3077F SYST BP >= 140 MM HG: CPT | Mod: CPTII,S$GLB,, | Performed by: NURSE PRACTITIONER

## 2020-07-08 PROCEDURE — 99999 PR PBB SHADOW E&M-EST. PATIENT-LVL V: ICD-10-PCS | Mod: PBBFAC,,, | Performed by: NURSE PRACTITIONER

## 2020-07-08 PROCEDURE — 3078F PR MOST RECENT DIASTOLIC BLOOD PRESSURE < 80 MM HG: ICD-10-PCS | Mod: CPTII,S$GLB,, | Performed by: NURSE PRACTITIONER

## 2020-07-08 PROCEDURE — 99499 RISK ADDL DX/OHS AUDIT: ICD-10-PCS | Mod: S$GLB,,, | Performed by: NURSE PRACTITIONER

## 2020-07-08 RX ORDER — CARVEDILOL 25 MG/1
25 TABLET ORAL 2 TIMES DAILY WITH MEALS
Qty: 60 TABLET | Refills: 11 | Status: SHIPPED | OUTPATIENT
Start: 2020-07-08 | End: 2021-05-23 | Stop reason: SDUPTHER

## 2020-07-13 ENCOUNTER — TELEPHONE (OUTPATIENT)
Dept: CARDIOLOGY | Facility: CLINIC | Age: 75
End: 2020-07-13

## 2020-07-15 ENCOUNTER — HOSPITAL ENCOUNTER (OUTPATIENT)
Dept: CARDIOLOGY | Facility: HOSPITAL | Age: 75
Discharge: HOME | End: 2020-07-15
Attending: NURSE PRACTITIONER
Payer: MEDICARE

## 2020-07-15 VITALS — WEIGHT: 235 LBS | BODY MASS INDEX: 43.24 KG/M2 | HEIGHT: 62 IN

## 2020-07-15 DIAGNOSIS — R07.89 OTHER CHEST PAIN: ICD-10-CM

## 2020-07-15 LAB
% MYOCARDIUM- DEFECT 1: 8 %
% MYOCARDIUM- DEFECT 2: 7 %
CFR FLOW - ANTERIOR: 1.01
CFR FLOW - INFERIOR: 1.28
CFR FLOW - LATERAL: 1.17
CFR FLOW - MAX: 2.21
CFR FLOW - MIN: 0.65
CFR FLOW - SEPTAL: 1.59
CFR FLOW - WHOLE HEART: 1.26
CFR FLOW- DEFECT 1: 0.88 CC/MIN/G
CFR FLOW- DEFECT 2: 0.8 CC/MIN/G
CV PHARM DOSE: 59.8 MG
CV STRESS BASE HR: 79 BPM
DIASTOLIC BLOOD PRESSURE: 81 MMHG
END DIASTOLIC INDEX-HIGH: 170 ML/M2
END SYSTOLIC INDEX-HIGH: 70 ML/M2
OHS CV CPX 85 PERCENT MAX PREDICTED HEART RATE MALE: 120
OHS CV CPX MAX PREDICTED HEART RATE: 142
OHS CV CPX PATIENT IS FEMALE: 1
OHS CV CPX PATIENT IS MALE: 0
OHS CV CPX PEAK DIASTOLIC BLOOD PRESSURE: 74 MMHG
OHS CV CPX PEAK HEAR RATE: 90 BPM
OHS CV CPX PEAK RATE PRESSURE PRODUCT: NORMAL
OHS CV CPX PEAK SYSTOLIC BLOOD PRESSURE: 122 MMHG
OHS CV CPX PERCENT MAX PREDICTED HEART RATE ACHIEVED: 63
OHS CV CPX RATE PRESSURE PRODUCT PRESENTING: NORMAL
PERFUSION DEFECT SIZE WORSENS % 1: 5 %
PERFUSION DEFECT WORSENS SIZE IN % 2: 7 %
REST FLOW - ANTERIOR: 1.3 CC/MIN/G
REST FLOW - INFERIOR: 1.26 CC/MIN/G
REST FLOW - LATERAL: 1.24 CC/MIN/G
REST FLOW - MAX: 1.52 CC/MIN/G
REST FLOW - MIN: 0.88 CC/MIN/G
REST FLOW - SEPTAL: 1.26 CC/MIN/G
REST FLOW - WHOLE HEART: 1.27 CC/MIN/G
REST FLOW- DEFECT 1: 1.12 CC/MIN/G
REST FLOW- DEFECT 2: 1.27 CC/MIN/G
RETIRED EF AND QEF - SEE NOTES: 51 %
STRESS FLOW - ANTERIOR: 1.32 CC/MIN/G
STRESS FLOW - INFERIOR: 1.6 CC/MIN/G
STRESS FLOW - LATERAL: 1.45 CC/MIN/G
STRESS FLOW - MAX: 2.62 CC/MIN/G
STRESS FLOW - MIN: 0.78 CC/MIN/G
STRESS FLOW - SEPTAL: 2 CC/MIN/G
STRESS FLOW - WHOLE HEART: 1.59 CC/MIN/G
STRESS FLOW- DEFECT 1: 0.99 CC/MIN/G
STRESS FLOW- DEFECT 2: 1.02 CC/MIN/G
SYSTOLIC BLOOD PRESSURE: 131 MMHG

## 2020-07-15 PROCEDURE — 78492 MYOCRD IMG PET MLT RST&STRS: CPT

## 2020-07-15 PROCEDURE — 78492 CARDIAC PET SCAN STRESS (CUPID ONLY): ICD-10-PCS | Mod: 26,,, | Performed by: INTERNAL MEDICINE

## 2020-07-15 PROCEDURE — 93017 CV STRESS TEST TRACING ONLY: CPT

## 2020-07-15 PROCEDURE — 93016 CV STRESS TEST SUPVJ ONLY: CPT | Mod: ,,, | Performed by: INTERNAL MEDICINE

## 2020-07-15 PROCEDURE — 78434 AQMBF PET REST & RX STRESS: CPT | Mod: 26,,, | Performed by: INTERNAL MEDICINE

## 2020-07-15 PROCEDURE — 78434 AQMBF PET REST & RX STRESS: CPT

## 2020-07-15 PROCEDURE — 25000003 PHARM REV CODE 250: Performed by: NURSE PRACTITIONER

## 2020-07-15 PROCEDURE — 93016 CARDIAC PET SCAN STRESS (CUPID ONLY): ICD-10-PCS | Mod: ,,, | Performed by: INTERNAL MEDICINE

## 2020-07-15 PROCEDURE — 93018 CARDIAC PET SCAN STRESS (CUPID ONLY): ICD-10-PCS | Mod: ,,, | Performed by: INTERNAL MEDICINE

## 2020-07-15 PROCEDURE — 78492 MYOCRD IMG PET MLT RST&STRS: CPT | Mod: 26,,, | Performed by: INTERNAL MEDICINE

## 2020-07-15 PROCEDURE — 63600175 PHARM REV CODE 636 W HCPCS: Performed by: NURSE PRACTITIONER

## 2020-07-15 PROCEDURE — 93018 CV STRESS TEST I&R ONLY: CPT | Mod: ,,, | Performed by: INTERNAL MEDICINE

## 2020-07-15 PROCEDURE — 78434 PR PET, ABS QUANT MYOCARD BLOOD FLOW, REST/STRESS: ICD-10-PCS | Mod: 26,,, | Performed by: INTERNAL MEDICINE

## 2020-07-15 RX ORDER — DIPYRIDAMOLE 5 MG/ML
59.82 INJECTION INTRAVENOUS ONCE
Status: COMPLETED | OUTPATIENT
Start: 2020-07-15 | End: 2020-07-15

## 2020-07-15 RX ORDER — NITROGLYCERIN 400 UG/1
1 SPRAY ORAL ONCE
Status: COMPLETED | OUTPATIENT
Start: 2020-07-15 | End: 2020-07-15

## 2020-07-15 RX ORDER — AMINOPHYLLINE 25 MG/ML
150 INJECTION, SOLUTION INTRAVENOUS ONCE
Status: COMPLETED | OUTPATIENT
Start: 2020-07-15 | End: 2020-07-15

## 2020-07-15 RX ADMIN — AMINOPHYLLINE 150 MG: 25 INJECTION, SOLUTION INTRAVENOUS at 02:07

## 2020-07-15 RX ADMIN — DIPYRIDAMOLE 59.8 MG: 5 INJECTION INTRAVENOUS at 02:07

## 2020-07-15 RX ADMIN — Medication 1 SPRAY: at 03:07

## 2020-07-29 ENCOUNTER — TELEPHONE (OUTPATIENT)
Dept: ENDOCRINOLOGY | Facility: CLINIC | Age: 75
End: 2020-07-29

## 2020-08-04 NOTE — PROGRESS NOTES
Digital Medicine: Health  Follow-Up    The history is provided by the patient.             Reason for review: Blood glucose not at goal and Blood pressure at goal        Topics Covered on Call: Diet    Additional Follow-up details: Patient has not been putting correct meal times.  States she always tests before meals, but last couple of readings patient has put after lunch.  States she sleeps until the afternoons and does not eat breakfast.        Diet-Change    Patient reports eating or drinking the following: Reports she does not eat breakfast and has a late dinner and will sometimes have snack after dinner. States she feels this is why readings are in the 160s.    Intervention(s): portion control, regularly scheduling meals and reducing late night snacking    Additional diet details:    Physical Activity-Not assessed    Medication Adherence-Medication Adherence not addressed.      Substance, Sleep, Stress-Not assessed    PLAN  Provided patient education:  Reviewed Device Techniques  Patient verbalizes understanding. Patient did not express questions or concerns and patient has contact information if needed.    Explained the importance of self-monitoring and medication adherence. Encouraged the patient to communicate with their health  for lifestyle modifications to help improve or maintain a healthy lifestyle.            Topic    Eye Exam        Last 5 Patient Entered Readings                                      Current 30 Day Average: 119/75     Recent Readings 8/3/2020 7/30/2020 7/13/2020 7/8/2020 7/3/2020    SBP (mmHg) 111 123 122 119 119    DBP (mmHg) 72 76 78 74 70    Pulse 79 80 75 75 81        Last 6 Patient Entered Readings                                          Most Recent A1c: 6.5% on 5/15/2020  (Goal: 8%)     Recent Readings 8/3/2020 8/1/2020 7/30/2020 7/26/2020 7/25/2020    Blood Glucose (mg/dL) 164 167 168 146 104

## 2020-08-10 RX ORDER — CLOPIDOGREL BISULFATE 75 MG/1
75 TABLET ORAL DAILY
Qty: 30 TABLET | Refills: 10 | Status: CANCELLED | OUTPATIENT
Start: 2020-08-10 | End: 2021-08-10

## 2020-08-14 ENCOUNTER — TELEPHONE (OUTPATIENT)
Dept: PHARMACY | Facility: CLINIC | Age: 75
End: 2020-08-14

## 2020-08-14 ENCOUNTER — TELEPHONE (OUTPATIENT)
Dept: ORTHOPEDICS | Facility: CLINIC | Age: 75
End: 2020-08-14

## 2020-08-14 NOTE — TELEPHONE ENCOUNTER
----- Message from Asa Tolbert sent at 8/14/2020 11:28 AM CDT -----  Contact: pt  Please call pt at [phone]     Patient is having right knee pain and has other medical questions    Thank you

## 2020-08-14 NOTE — TELEPHONE ENCOUNTER
Ordered refill with [facility] ([facility]).  Medication will be shipped to patients home within 7-10 business days.

## 2020-08-18 ENCOUNTER — PATIENT OUTREACH (OUTPATIENT)
Dept: OTHER | Facility: OTHER | Age: 75
End: 2020-08-18

## 2020-08-18 NOTE — PROGRESS NOTES
Digital Medicine: Health  Follow-Up    The history is provided by the patient.               Care Team received low BG alert.        Additional Follow-up details: Following up about low BG reading of 11 mg/dL.  States she did not have enough blood on the strip, and retested it about 1 minute later.  Patient denies hypoglycemic symptoms.  Removing reading of 11 mg/dL from patient's chart due to error.        Diet-Not assessed          Physical Activity-Not assessed    Medication Adherence-Medication Adherence not addressed.      Substance, Sleep, Stress-Not assessed      Reviewed Device Techniques.     Addressed any questions or concerns and patient has my contact information if needed prior to next outreach. Patient verbalizes understanding.      Explained the importance of self-monitoring and medication adherence. Encouraged the patient to communicate with their health  for lifestyle modifications to help improve or maintain a healthy lifestyle.                Topic    Eye Exam     Hemoglobin A1C        Last 5 Patient Entered Readings                                      Current 30 Day Average: 114/75     Recent Readings 8/17/2020 8/12/2020 8/7/2020 8/6/2020 8/3/2020    SBP (mmHg) 116 111 113 111 111    DBP (mmHg) 67 91 78 65 72    Pulse 77 86 83 76 79        Last 6 Patient Entered Readings                                          Most Recent A1c: 6.5% on 5/15/2020  (Goal: 8%)     Recent Readings 8/18/2020 8/18/2020 8/17/2020 8/12/2020 8/7/2020    Blood Glucose (mg/dL) 130 11 152 141 116

## 2020-08-23 NOTE — PT/OT/SLP EVAL
Occupational Therapy   Evaluation    Kaylee Romero   MRN: [id number]     OT Received On: 2017  OT Start Time: 1130  OT Stop Time: 1150   OT Total Time: 20 minutes     Billable Minutes:  Evaluation 12 minutes  Self Care/Home Management 8 minutes    Diagnosis: S/p L TKA    Past Medical History:   Diagnosis Date    Acid reflux     Arthritis     Cataract     Diabetes mellitus, type 2     Dry mouth     Hyperlipidemia 2014    Hypertension     KAMRAN (obstructive sleep apnea)      Past Surgical History:   Procedure Laterality Date    ankle surgery (l)      APPENDECTOMY       SECTION      DILATION AND CURETTAGE OF UTERUS      KNEE ARTHROSCOPY W/ DEBRIDEMENT         General Precautions: Standard, fall  Orthopedic Precautions: LLE weight bearing as tolerated     Pt found with blood pressure cuff, polar ice, PNC, telemetry, PCEA, pulse ox, Peripheral IV and FCD.      Patient History:  Pt lives with  in [facility] with 3 PAUL.  Spv/(A) available    DME owned: none    Previous Level of Function:  Bed Mobility/Transfers: (I)  ADLs: (I)    Subjective:  Communicated with RN prior to session.    Pt agreeable to OT eval    Pain level: 7/10 in L knee    Objective:  Patient found Supine    Upper Extremity Range of Motion:  Right Lower Extremity: WFL  Left Lower Extremity: WFL     Upper Extremity Strength:  Right Lower Extremity: WFL  Left Lower Extremity: WFL    Functional Mobility:    Bed Mobility:    Supine to sit: ModA  Sit to supine: MaxA 2* lethargy    Transfers:    Sit<>Stand: [place] with SW    Ambulation:    ModA with SW 3 side steps. Pt with increased lethargy     Activities of Daily Living:  UE dressing: Maximum Assistance to candace gown around back  LE dressing: Total Assistance to candace socks  Grooming while seated EOB: SBA to wash face  Toileting: Maximum Assistance     Patient left supine with all lines intact and RN notified.    AM-PAC 6 CLICK MOBILITY  1 = Unable, Total/Dependent Assistance  2 =  A lot, Maximum/Moderate Assistance  3 = A little, Minimum/Contact Guard/Supervision  4 = None, Modified [place]/Independent    Putting on and taking off regular lower body clothing? : 2  Bathing (including washing, rinsing, drying)?: 2  Toileting, which includes using toilet, bedpan, or urinal? : 2  Putting on and taking off regular upper body clothing?: 3  Taking care of personal grooming such as brushing teeth?: 3  Eating meals?: 4  Total Score: 16    AM-PAC Raw Score   CMS G-Code Modifier   Level of Impairment   Assistance     6   CN   100%         Total / Unable   7 - 9   CM   80 - 100%   Maximal Assist     10 - 14   CL   60 - 80%   Moderate Assist     15 - 19   CK   40 - 60%   Moderate Assist     20 - 22   CJ   20 - 40%   Minimal Assist     23   CI   1-20%         SBA / CGA     24 CH   0%   Independent/Modified Independent       Assessment:  Kaylee Romero is a 72 y.o. female with a medical diagnosis of s/p L TKA. She presents with decreased function and orthopedic precautions of L LE impeding her ability to perform ADLs and functional mobility and would benefit from OT services to maximize functional (I) and safety.      Rehab identified problem list/impairments: weakness, impaired endurance, impaired sensation, impaired self care skills, impaired functional mobilty, gait instability, impaired balance, decreased coordination, decreased lower extremity function, decreased safety awareness, pain, decreased ROM, impaired skin, edema, orthopedic precautions    Rehab potential is good.    Activity tolerance: good    Discharge recommendations: Home with HH     Barriers to discharge: Inaccessible home environment     Equipment recommendations: rolling walker, bedside commode and shower chair    GOALS:   Occupational Therapy Goals        Problem: Occupational Therapy Goal    Goal Priority Disciplines Outcome Interventions   Occupational Therapy Goal     OT, PT/OT     Description:  Goals to be met by:  05/08    Patient will increase functional independence with ADLs by performing:    UE Dressing with Supervision.  LE Dressing with Supervision with AD as needed.  Grooming while standing with Supervision.  Toileting from bedside commode with Supervision for hygiene and clothing management.   Stand pivot transfers with Supervision.  Toilet transfer to bedside commode with Supervision.                   PLAN:    Patient to be seen QD to address the above listed problems via self-care/home management, therapeutic activities, therapeutic exercises  Plan of Care reviewed with: patient    INGRID Arrieta  5/1/2017     given to security

## 2020-09-17 ENCOUNTER — PATIENT OUTREACH (OUTPATIENT)
Dept: OTHER | Facility: OTHER | Age: 75
End: 2020-09-17

## 2020-09-17 NOTE — PROGRESS NOTES
Digital Medicine: Clinician Follow-Up    Called Kaylee Romero for hypertension and diabetes follow-up: Last A1c, Avg BP:  6.5%, 123/78    The history is provided by the patient.   Follow-up reason(s): routine follow up.     Hypertension    Readings are trending up   Diabetes    Readings are trending down   Additional Follow-up details: Patient reports doing well and being compliant to medications. She has not questions/concerns at this time.         Last 5 Patient Entered Readings                                      Current 30 Day Average: 123/78     Recent Readings 9/15/2020 9/1/2020 8/27/2020 8/17/2020 8/12/2020    SBP (mmHg) 140 119 109 116 111    DBP (mmHg) 88 77 68 67 91    Pulse 80 83 88 77 86        Last 6 Patient Entered Readings                                          Most Recent A1c: 6.5% on 5/15/2020  (Goal: 8%)     Recent Readings 9/17/2020 9/15/2020 9/1/2020 8/27/2020 8/26/2020    Blood Glucose (mg/dL) 161 94 182 124 230               Depression Screening  Did not address depression screening.    Sleep Apnea Screening    Did not address sleep apnea screening.     Medication Affordability Screening  Did not address medication affordability screening.     Medication Adherence-Medication adherence was assessed.          ASSESSMENT(S)  Patients BP average is 123/78 mmHg, which is at goal. Patient's BP goal is less than or equal to 130/80 per 2017 ACC/AHA Hypertension Guidelines.  Patient's A1C goal is less than or equal to 8 per 2020 ADA guidelines. Patient's most recent A1C result is at goal. Lab Results    Component                Value               Date                     HGBA1C                   6.5 (H)             05/15/2020          .       BP and T2DM continues to be well-controlled with current regimen. No medication changes suggested at this time.       Hypertension Plan  Continue current therapy.    Diabetes Plan  Continue current therapy.       Addressed patient questions and patient has my  contact information if needed prior to next outreach. Patient verbalizes understanding.                Topic    Eye Exam     Hemoglobin A1C      There are no preventive care reminders to display for this patient.    Hypertension Medications             amLODIPine (NORVASC) 10 MG tablet Take 1 tablet (10 mg total) by mouth once daily.    carvediloL (COREG) 25 MG tablet Take 1 tablet (25 mg total) by mouth 2 (two) times daily with meals.    hydrALAZINE (APRESOLINE) 25 MG tablet Take 1 tablet (25 mg total) by mouth 2 (two) times a day.    irbesartan (AVAPRO) 300 MG tablet Take 1 tablet (300 mg total) by mouth every evening.    nitroGLYCERIN (NITROSTAT) 0.4 MG SL tablet Place 1 tablet (0.4 mg total) under the tongue every 5 (five) minutes as needed for Chest pain.        Diabetes Medications             dulaglutide (TRULICITY) 1.5 mg/0.5 mL PnIj Inject 1.5 mg into the skin every 7 days.    glipiZIDE (GLUCOTROL) 10 MG tablet TAKE 1 TABLET(10 MG) BY MOUTH DAILY WITH BREAKFAST    insulin (BASAGLAR KWIKPEN U-100 INSULIN) glargine 100 units/mL (3mL) SubQ pen Inject 15 Units into the skin once daily.    insulin glargine,hum.rec.anlog (BASAGLAR KWIKPEN U-100 INSULIN SUBQ) Inject 15 Units into the skin once daily.

## 2020-09-21 ENCOUNTER — LAB VISIT (OUTPATIENT)
Dept: LAB | Facility: HOSPITAL | Age: 75
End: 2020-09-21
Payer: MEDICARE

## 2020-09-21 DIAGNOSIS — I12.9 TYPE 2 DIABETES MELLITUS WITH STAGE 3 CHRONIC KIDNEY DISEASE AND HYPERTENSION: ICD-10-CM

## 2020-09-21 DIAGNOSIS — N18.30 TYPE 2 DIABETES MELLITUS WITH STAGE 3 CHRONIC KIDNEY DISEASE AND HYPERTENSION: ICD-10-CM

## 2020-09-21 DIAGNOSIS — E11.22 TYPE 2 DIABETES MELLITUS WITH STAGE 3 CHRONIC KIDNEY DISEASE AND HYPERTENSION: ICD-10-CM

## 2020-09-21 LAB
ALBUMIN SERPL BCP-MCNC: 3.4 G/DL (ref 3.5–5.2)
ALP SERPL-CCNC: 100 U/L (ref 55–135)
ALT SERPL W/O P-5'-P-CCNC: 26 U/L (ref 10–44)
ANION GAP SERPL CALC-SCNC: 10 MMOL/L (ref 8–16)
AST SERPL-CCNC: 22 U/L (ref 10–40)
BILIRUB SERPL-MCNC: 0.5 MG/DL (ref 0.1–1)
BUN SERPL-MCNC: 19 MG/DL (ref 8–23)
CALCIUM SERPL-MCNC: 9.7 MG/DL (ref 8.7–10.5)
CHLORIDE SERPL-SCNC: 101 MMOL/L (ref 95–110)
CO2 SERPL-SCNC: 25 MMOL/L (ref 23–29)
CREAT SERPL-MCNC: 1 MG/DL (ref 0.5–1.4)
EST. GFR  (AFRICAN AMERICAN): >60 ML/MIN/1.73 M^2
EST. GFR  (NON AFRICAN AMERICAN): 56 ML/MIN/1.73 M^2
ESTIMATED AVG GLUCOSE: 160 MG/DL (ref 68–131)
GLUCOSE SERPL-MCNC: 131 MG/DL (ref 70–110)
HBA1C MFR BLD HPLC: 7.2 % (ref 4–5.6)
POTASSIUM SERPL-SCNC: 4 MMOL/L (ref 3.5–5.1)
PROT SERPL-MCNC: 8.5 G/DL (ref 6–8.4)
SODIUM SERPL-SCNC: 136 MMOL/L (ref 136–145)

## 2020-09-21 PROCEDURE — 36415 COLL VENOUS BLD VENIPUNCTURE: CPT | Mod: PN

## 2020-09-21 PROCEDURE — 83036 HEMOGLOBIN GLYCOSYLATED A1C: CPT

## 2020-09-21 PROCEDURE — 80053 COMPREHEN METABOLIC PANEL: CPT

## 2020-09-23 ENCOUNTER — PATIENT OUTREACH (OUTPATIENT)
Dept: ADMINISTRATIVE | Facility: OTHER | Age: 75
End: 2020-09-23

## 2020-09-23 ENCOUNTER — TELEPHONE (OUTPATIENT)
Dept: ORTHOPEDICS | Facility: CLINIC | Age: 75
End: 2020-09-23

## 2020-09-23 DIAGNOSIS — Z96.652 HISTORY OF TOTAL LEFT KNEE REPLACEMENT (TKR): Primary | ICD-10-CM

## 2020-09-23 NOTE — TELEPHONE ENCOUNTER
----- Message from Juana Roberson sent at 9/23/2020  3:40 PM CDT -----  Pt returned about about xray location wants to know if she  can take it in the clinic instead of across the street. Asking for a call back.    Contact info [phone]     we spoke  Told her it' saves time to go there then wait for a spot here

## 2020-09-24 ENCOUNTER — HOSPITAL ENCOUNTER (OUTPATIENT)
Dept: RADIOLOGY | Facility: HOSPITAL | Age: 75
Discharge: HOME | End: 2020-09-24
Attending: ORTHOPAEDIC SURGERY
Payer: MEDICARE

## 2020-09-24 ENCOUNTER — OFFICE VISIT (OUTPATIENT)
Dept: ORTHOPEDICS | Facility: CLINIC | Age: 75
End: 2020-09-24
Payer: MEDICARE

## 2020-09-24 VITALS — WEIGHT: 238.13 LBS | HEIGHT: 63 IN | BODY MASS INDEX: 42.19 KG/M2

## 2020-09-24 DIAGNOSIS — M17.11 PRIMARY OSTEOARTHRITIS OF RIGHT KNEE: Primary | ICD-10-CM

## 2020-09-24 DIAGNOSIS — Z96.652 HISTORY OF TOTAL LEFT KNEE REPLACEMENT (TKR): ICD-10-CM

## 2020-09-24 PROCEDURE — 73562 X-RAY EXAM OF KNEE 3: CPT | Mod: 26,50,, | Performed by: RADIOLOGY

## 2020-09-24 PROCEDURE — 73562 X-RAY EXAM OF KNEE 3: CPT | Mod: TC,50

## 2020-09-24 PROCEDURE — 3008F PR BODY MASS INDEX (BMI) DOCUMENTED: ICD-10-PCS | Mod: CPTII,S$GLB,, | Performed by: ORTHOPAEDIC SURGERY

## 2020-09-24 PROCEDURE — 1125F AMNT PAIN NOTED PAIN PRSNT: CPT | Mod: S$GLB,,, | Performed by: ORTHOPAEDIC SURGERY

## 2020-09-24 PROCEDURE — 99214 PR OFFICE/OUTPT VISIT, EST, LEVL IV, 30-39 MIN: ICD-10-PCS | Mod: 25,S$GLB,, | Performed by: ORTHOPAEDIC SURGERY

## 2020-09-24 PROCEDURE — 99214 OFFICE O/P EST MOD 30 MIN: CPT | Mod: 25,S$GLB,, | Performed by: ORTHOPAEDIC SURGERY

## 2020-09-24 PROCEDURE — 1125F PR PAIN SEVERITY QUANTIFIED, PAIN PRESENT: ICD-10-PCS | Mod: S$GLB,,, | Performed by: ORTHOPAEDIC SURGERY

## 2020-09-24 PROCEDURE — 3008F BODY MASS INDEX DOCD: CPT | Mod: CPTII,S$GLB,, | Performed by: ORTHOPAEDIC SURGERY

## 2020-09-24 PROCEDURE — 1101F PT FALLS ASSESS-DOCD LE1/YR: CPT | Mod: CPTII,S$GLB,, | Performed by: ORTHOPAEDIC SURGERY

## 2020-09-24 PROCEDURE — 20610 DRAIN/INJ JOINT/BURSA W/O US: CPT | Mod: RT,S$GLB,, | Performed by: ORTHOPAEDIC SURGERY

## 2020-09-24 PROCEDURE — 73562 XR KNEE ORTHO BILAT: ICD-10-PCS | Mod: 26,50,, | Performed by: RADIOLOGY

## 2020-09-24 PROCEDURE — 99999 PR PBB SHADOW E&M-EST. PATIENT-LVL IV: CPT | Mod: PBBFAC,,, | Performed by: ORTHOPAEDIC SURGERY

## 2020-09-24 PROCEDURE — 20610 LARGE JOINT ASPIRATION/INJECTION: R KNEE: ICD-10-PCS | Mod: RT,S$GLB,, | Performed by: ORTHOPAEDIC SURGERY

## 2020-09-24 PROCEDURE — 1159F MED LIST DOCD IN RCRD: CPT | Mod: S$GLB,,, | Performed by: ORTHOPAEDIC SURGERY

## 2020-09-24 PROCEDURE — 99999 PR PBB SHADOW E&M-EST. PATIENT-LVL IV: ICD-10-PCS | Mod: PBBFAC,,, | Performed by: ORTHOPAEDIC SURGERY

## 2020-09-24 PROCEDURE — 1101F PR PT FALLS ASSESS DOC 0-1 FALLS W/OUT INJ PAST YR: ICD-10-PCS | Mod: CPTII,S$GLB,, | Performed by: ORTHOPAEDIC SURGERY

## 2020-09-24 PROCEDURE — 1159F PR MEDICATION LIST DOCUMENTED IN MEDICAL RECORD: ICD-10-PCS | Mod: S$GLB,,, | Performed by: ORTHOPAEDIC SURGERY

## 2020-09-24 RX ORDER — TRIAMCINOLONE ACETONIDE 40 MG/ML
20 INJECTION, SUSPENSION INTRA-ARTICULAR; INTRAMUSCULAR
Status: DISCONTINUED | OUTPATIENT
Start: 2020-09-24 | End: 2020-09-24 | Stop reason: HOSPADM

## 2020-09-24 RX ADMIN — TRIAMCINOLONE ACETONIDE 20 MG: 40 INJECTION, SUSPENSION INTRA-ARTICULAR; INTRAMUSCULAR at 10:09

## 2020-09-24 NOTE — PROCEDURES
Large Joint Aspiration/Injection: R knee    Date/Time: 9/24/2020 10:40 AM  Performed by: John L. Ochsner Jr., MD  Authorized by: John L. Ochsner Jr., MD     Consent Done?:  Yes (Verbal)  Indications:  Pain  Site marked: the procedure site was marked    Timeout: prior to procedure the correct patient, procedure, and site was verified    Prep: patient was prepped and draped in usual sterile fashion      Details:  Needle Size:  20 G  Ultrasonic Guidance for needle placement?: No    Approach:  Anteromedial  Location:  Knee  Site:  R knee  Medications:  20 mg triamcinolone acetonide 40 mg/mL  Patient tolerance:  Patient tolerated the procedure well with no immediate complications

## 2020-09-24 NOTE — PROGRESS NOTES
HPI:    Kaylee Romero is a 75 y.o. female who is here today for   Chief Complaint   Patient presents with    Right Knee - Pain   .  Status post left total knee done by me 05/01/2017.  Unfortunately since then the patient has undergone open heart surgery and additional stents back in August of 2019.  Last January I injected the right knee.  She is here today for another injection.  She is not ready for the right knee replacement yet.    Duration: 9 months  Intensity: severe  Character of pain: sharp  Location: She reports that the pain is predominately  medial    Past Medical History:   Diagnosis Date    Acid reflux     Age-related osteoporosis without current pathological fracture 1/11/2019    Cataract     CKD (chronic kidney disease) stage 3, GFR 30-59 ml/min     Dry mouth     History of TIA (transient ischemic attack) 12/11/2018    Hyperlipidemia 12/2/2014    Hypertensive heart disease with diastolic heart failure 11/28/2012    Morbid obesity with body mass index (BMI) of 40.0 or higher 5/9/2016    KAMRAN (obstructive sleep apnea)     KAMRAN on CPAP 6/28/2016    Osteoporosis without current pathological fracture 11/11/2018    Physical deconditioning 11/5/2018    Status post total left knee replacement 5/1/2017 5/16/2017    Type 2 diabetes mellitus with stage 3 chronic kidney disease, without long-term current use of insulin 5/16/2019          Current Outpatient Medications:     alcohol swabs (ALCOHOL PADS) PadM, Apply 1 each topically as needed., Disp: 11 each, Rfl: 11    alendronate (FOSAMAX) 70 MG tablet, Take 1 tablet (70 mg total) by mouth every 7 days. Take with a full glass of water & remain upright for at least 30 minutes, Disp: 12 tablet, Rfl: 3    amLODIPine (NORVASC) 10 MG tablet, Take 1 tablet (10 mg total) by mouth once daily., Disp: 90 tablet, Rfl: 1    aspirin 81 MG Chew, Take 1 tablet (81 mg total) by mouth once daily., Disp: , Rfl: 0    atorvastatin (LIPITOR) 80 MG tablet, Take  "1 tablet (80 mg total) by mouth once daily., Disp: 90 tablet, Rfl: 2    blood sugar diagnostic Strp, 1 strip by Misc.(Non-Drug; Combo Route) route once daily., Disp: 100 strip, Rfl: 3    carvediloL (COREG) 25 MG tablet, Take 1 tablet (25 mg total) by mouth 2 (two) times daily with meals., Disp: 60 tablet, Rfl: 11    clopidogreL (PLAVIX) 75 mg tablet, Take 1 tablet (75 mg total) by mouth once daily., Disp: 90 tablet, Rfl: 1    COD LIVER OIL ORAL, Take 3 capsules by mouth once daily., Disp: , Rfl:     diphenoxylate-atropine 2.5-0.025 mg (LOMOTIL) 2.5-0.025 mg per tablet, Take 1 tablet by mouth 4 (four) times daily as needed for Diarrhea., Disp: 20 tablet, Rfl: 0    dulaglutide (TRULICITY) 1.5 mg/0.5 mL PnIj, Inject 1.5 mg into the skin every 7 days., Disp: 4 Syringe, Rfl: 5    ferrous sulfate (FEOSOL) 325 mg (65 mg iron) Tab tablet, TAKE 1 TABLET BY MOUTH EVERY DAY, Disp: 90 tablet, Rfl: 0    glipiZIDE (GLUCOTROL) 10 MG tablet, TAKE 1 TABLET(10 MG) BY MOUTH DAILY WITH BREAKFAST, Disp: 90 tablet, Rfl: 1    insulin (BASAGLAR KWIKPEN U-100 INSULIN) glargine 100 units/mL (3mL) SubQ pen, Inject 15 Units into the skin once daily., Disp: 3 Box, Rfl: 11    insulin glargine,hum.rec.anlog (BASAGLAR KWIKPEN U-100 INSULIN SUBQ), Inject 15 Units into the skin once daily., Disp: , Rfl:     irbesartan (AVAPRO) 300 MG tablet, Take 1 tablet (300 mg total) by mouth every evening., Disp: 90 tablet, Rfl: 1    lancets Misc, 1 lancet by Misc.(Non-Drug; Combo Route) route once daily., Disp: 100 each, Rfl: 3    multivitamin (ONE DAILY MULTIVITAMIN) per tablet, Take 1 tablet by mouth once daily., Disp: , Rfl:     nitroGLYCERIN (NITROSTAT) 0.4 MG SL tablet, Place 1 tablet (0.4 mg total) under the tongue every 5 (five) minutes as needed for Chest pain., Disp: 25 tablet, Rfl: 6    pen needle, diabetic 31 gauge x 5/16" Ndle, Use every evening., Disp: 100 each, Rfl: 11    vitamin D (VITAMIN D3) 1000 units Tab, Take 1,000 Units by " "mouth once daily., Disp: , Rfl:     flash glucose scanning reader Misc, Use as directed., Disp: 1 each, Rfl: 0    flash glucose sensor Kit, 1 Device by Misc.(Non-Drug; Combo Route) route every 14 (fourteen) days., Disp: 2 kit, Rfl: 5    hydrALAZINE (APRESOLINE) 25 MG tablet, Take 1 tablet (25 mg total) by mouth 2 (two) times a day. (Patient not taking: Reported on 9/24/2020), Disp: 60 tablet, Rfl: 11     Review of patient's allergies indicates:   Allergen Reactions    Bactrim [sulfamethoxazole-trimethoprim] Other (See Comments)     Generic version  Sulfa makes her sick    Keflex [cephalexin] Other (See Comments)     Turns orange        ROS  Constitutional: Negative for fever, Negative for weight loss  HENT Negative for congestion  Cardiovascular: Negative chest pain  Respiratory: Positive Shortness of breath  Heme: Negative excessive bleeding  Skin:NegativeItching, Negative breakdown  Musculoskeletal:Positive for back pain, Positive for joint pain, Negative muscle pain, Negative muscle weakness  Neurological: Negative for numbness and paresthesias   Psychiatric/Behavioral: Negative altered mental status, Negative for depression    Physical Exam:   Ht 5' 2.5" (1.588 m)   Wt 108 kg (238 lb 1.6 oz)   LMP 01/09/2001 (Approximate)   BMI 42.85 kg/m²   General appearance: This is a well-developed, Well nourished female No obvious acute distress.  Psychiatric: normal mood and affect;  pleasant and conversant; judgment and thought content normal  Gait is coordinated. Patient has antalgic gait to the right  Cardiovascular: There are no swelling or varicosities present.   Respiratory: normal respiratory effort   Lymphatic: no adenopathy   Neurologic: alert and oriented to person, place, and time   Deep Tendon Reflexes are normal;  Coordination and Balance: Normal   Musculoskeletal   Neck    ROM shows normal flexion and extension and lateral rotation    Palpation: Non-tender    Stability is normal    Strength is " normal    Skin is normal without masses and lesions    Sensation is intact to light touch   Back    ROM showsabnormal flexion, extension    and  rotation    Palpation shows no masses    Stability is normal    Strength to flexion and extension well maintained    Core strength is diminished    Skin shows no rashes or cafe au lait spots;     Sensation is intact to light touch    Right Knee  Swelling Mild  TendernessMedial joint line  Range of Motion: 5-115    Left Knee: Swelling None  TendernessNone  Range of Motion: 120    Radiograph   Osteoarthritis: severe  Angle: mild varus    Assessment:  Severe osteoarthritis right knee.    Plan:  Inject right knee today.  Follow up with Dr. Lobo in 6 months for possible knee replacement.  Patient will need to lose another 20 lb.

## 2020-09-25 ENCOUNTER — OFFICE VISIT (OUTPATIENT)
Dept: PRIMARY CARE CLINIC | Facility: CLINIC | Age: 75
End: 2020-09-25
Payer: MEDICARE

## 2020-09-25 VITALS
HEIGHT: 63 IN | TEMPERATURE: 99 F | BODY MASS INDEX: 42.66 KG/M2 | HEART RATE: 95 BPM | DIASTOLIC BLOOD PRESSURE: 76 MMHG | SYSTOLIC BLOOD PRESSURE: 134 MMHG | OXYGEN SATURATION: 96 % | WEIGHT: 240.75 LBS

## 2020-09-25 DIAGNOSIS — Z23 INFLUENZA VACCINE NEEDED: ICD-10-CM

## 2020-09-25 DIAGNOSIS — E11.22 TYPE 2 DIABETES MELLITUS WITH STAGE 2 CHRONIC KIDNEY DISEASE, WITH LONG-TERM CURRENT USE OF INSULIN: Primary | ICD-10-CM

## 2020-09-25 DIAGNOSIS — N18.2 TYPE 2 DIABETES MELLITUS WITH STAGE 2 CHRONIC KIDNEY DISEASE, WITH LONG-TERM CURRENT USE OF INSULIN: Primary | ICD-10-CM

## 2020-09-25 DIAGNOSIS — I11.9 HYPERTENSIVE HEART DISEASE WITHOUT HEART FAILURE: ICD-10-CM

## 2020-09-25 DIAGNOSIS — I25.118 CORONARY ARTERY DISEASE OF NATIVE ARTERY OF NATIVE HEART WITH STABLE ANGINA PECTORIS: ICD-10-CM

## 2020-09-25 DIAGNOSIS — E66.01 MORBID OBESITY WITH BMI OF 40.0-44.9, ADULT: ICD-10-CM

## 2020-09-25 DIAGNOSIS — M81.0 OSTEOPOROSIS WITHOUT CURRENT PATHOLOGICAL FRACTURE, UNSPECIFIED OSTEOPOROSIS TYPE: ICD-10-CM

## 2020-09-25 DIAGNOSIS — Z79.4 TYPE 2 DIABETES MELLITUS WITH STAGE 2 CHRONIC KIDNEY DISEASE, WITH LONG-TERM CURRENT USE OF INSULIN: Primary | ICD-10-CM

## 2020-09-25 LAB — GLUCOSE SERPL-MCNC: 351 MG/DL (ref 70–110)

## 2020-09-25 PROCEDURE — 82962 GLUCOSE BLOOD TEST: CPT | Mod: S$GLB,,, | Performed by: INTERNAL MEDICINE

## 2020-09-25 PROCEDURE — 3008F BODY MASS INDEX DOCD: CPT | Mod: CPTII,S$GLB,, | Performed by: INTERNAL MEDICINE

## 2020-09-25 PROCEDURE — 99499 UNLISTED E&M SERVICE: CPT | Mod: S$GLB,,, | Performed by: INTERNAL MEDICINE

## 2020-09-25 PROCEDURE — 99214 PR OFFICE/OUTPT VISIT, EST, LEVL IV, 30-39 MIN: ICD-10-PCS | Mod: 25,S$GLB,ICN, | Performed by: INTERNAL MEDICINE

## 2020-09-25 PROCEDURE — 3051F HG A1C>EQUAL 7.0%<8.0%: CPT | Mod: CPTII,S$GLB,, | Performed by: INTERNAL MEDICINE

## 2020-09-25 PROCEDURE — 1101F PT FALLS ASSESS-DOCD LE1/YR: CPT | Mod: CPTII,S$GLB,, | Performed by: INTERNAL MEDICINE

## 2020-09-25 PROCEDURE — 1101F PR PT FALLS ASSESS DOC 0-1 FALLS W/OUT INJ PAST YR: ICD-10-PCS | Mod: CPTII,S$GLB,, | Performed by: INTERNAL MEDICINE

## 2020-09-25 PROCEDURE — 90694 VACC AIIV4 NO PRSRV 0.5ML IM: CPT | Mod: S$GLB,ICN,, | Performed by: INTERNAL MEDICINE

## 2020-09-25 PROCEDURE — 3075F PR MOST RECENT SYSTOLIC BLOOD PRESS GE 130-139MM HG: ICD-10-PCS | Mod: CPTII,S$GLB,, | Performed by: INTERNAL MEDICINE

## 2020-09-25 PROCEDURE — 3008F PR BODY MASS INDEX (BMI) DOCUMENTED: ICD-10-PCS | Mod: CPTII,S$GLB,, | Performed by: INTERNAL MEDICINE

## 2020-09-25 PROCEDURE — 90694 FLU VACCINE - QUADRIVALENT - ADJUVANTED: ICD-10-PCS | Mod: S$GLB,ICN,, | Performed by: INTERNAL MEDICINE

## 2020-09-25 PROCEDURE — 1159F PR MEDICATION LIST DOCUMENTED IN MEDICAL RECORD: ICD-10-PCS | Mod: S$GLB,,, | Performed by: INTERNAL MEDICINE

## 2020-09-25 PROCEDURE — 82962 POCT GLUCOSE, HAND-HELD DEVICE: ICD-10-PCS | Mod: S$GLB,,, | Performed by: INTERNAL MEDICINE

## 2020-09-25 PROCEDURE — 3078F PR MOST RECENT DIASTOLIC BLOOD PRESSURE < 80 MM HG: ICD-10-PCS | Mod: CPTII,S$GLB,, | Performed by: INTERNAL MEDICINE

## 2020-09-25 PROCEDURE — 1159F MED LIST DOCD IN RCRD: CPT | Mod: S$GLB,,, | Performed by: INTERNAL MEDICINE

## 2020-09-25 PROCEDURE — 99499 RISK ADDL DX/OHS AUDIT: ICD-10-PCS | Mod: S$GLB,,, | Performed by: INTERNAL MEDICINE

## 2020-09-25 PROCEDURE — 3051F PR MOST RECENT HEMOGLOBIN A1C LEVEL 7.0 - < 8.0%: ICD-10-PCS | Mod: CPTII,S$GLB,, | Performed by: INTERNAL MEDICINE

## 2020-09-25 PROCEDURE — G0008 ADMIN INFLUENZA VIRUS VAC: HCPCS | Mod: S$GLB,ICN,, | Performed by: INTERNAL MEDICINE

## 2020-09-25 PROCEDURE — 1126F PR PAIN SEVERITY QUANTIFIED, NO PAIN PRESENT: ICD-10-PCS | Mod: S$GLB,,, | Performed by: INTERNAL MEDICINE

## 2020-09-25 PROCEDURE — 99214 OFFICE O/P EST MOD 30 MIN: CPT | Mod: 25,S$GLB,ICN, | Performed by: INTERNAL MEDICINE

## 2020-09-25 PROCEDURE — 3078F DIAST BP <80 MM HG: CPT | Mod: CPTII,S$GLB,, | Performed by: INTERNAL MEDICINE

## 2020-09-25 PROCEDURE — 99999 PR PBB SHADOW E&M-EST. PATIENT-LVL V: CPT | Mod: PBBFAC,,, | Performed by: INTERNAL MEDICINE

## 2020-09-25 PROCEDURE — 3075F SYST BP GE 130 - 139MM HG: CPT | Mod: CPTII,S$GLB,, | Performed by: INTERNAL MEDICINE

## 2020-09-25 PROCEDURE — G0008 PR ADMIN INFLUENZA VIRUS VAC: ICD-10-PCS | Mod: S$GLB,ICN,, | Performed by: INTERNAL MEDICINE

## 2020-09-25 PROCEDURE — 99999 PR PBB SHADOW E&M-EST. PATIENT-LVL V: ICD-10-PCS | Mod: PBBFAC,,, | Performed by: INTERNAL MEDICINE

## 2020-09-25 PROCEDURE — 1126F AMNT PAIN NOTED NONE PRSNT: CPT | Mod: S$GLB,,, | Performed by: INTERNAL MEDICINE

## 2020-09-25 RX ORDER — CHLORTHALIDONE 25 MG/1
TABLET ORAL
COMMUNITY
Start: 2020-08-20 | End: 2021-03-17 | Stop reason: SDUPTHER

## 2020-09-25 NOTE — PROGRESS NOTES
Two patient identifiers verified.  Allergies reviewed.  flu IM administered to right deltoid per MD order.  Patient tolerated injection well; no redness, bleeding, or bruising noted to injection site.  Patient instructed to remain in clinic setting for 15 minutes.  Verbalizes understanding.

## 2020-09-25 NOTE — PROGRESS NOTES
Patient ID: Kaylee Romero is a 75 y.o. female    Chief Complaint:   Chief Complaint   Patient presents with    Diabetes     HPI: Kaylee Romero with type 2 diabetes complicated by CAD s/p CABG, TIAs, hypertension, dyslipidemia, CKD Stage 3, obesity, osteoporosis, and KAMRAN presents for follow up of Type 2 diabetes and osteoporosis. Previous patient of MAURICIO Pritchard NP. Last visit in May 2020. This is her first visit with me.     This is a Doximity video visit.    The patient location is: at home  The chief complaint leading to consultation is: Type 2 diabetes  Visit type: Virtual visit with synchronous audio and video  Total time spent with patient: 17 minutes  Each patient to whom he or she provides medical services by telemedicine is:  (1) informed of the relationship between the physician and patient and the respective role of any other health care provider with respect to management of the patient; and (2) notified that he or she may decline to receive medical services by telemedicine and may withdraw from such care at any time.    Was seen in the hospital by KAVON Pritchard NP in 9/2019 because of a Surgical Procedure and Date: CABG 8/12/19  He started her on levemir daily.     She states she had a cortisone shot on Thursday and caused her blood sugars above 400. She reports that she had some heart issues with the shot as well. She gave extra Basaglar 15 units when her BG is high.    With regards to the diabetes:     Diagnosed with Type 2 diabetes: 2013  Family history of Type 2 diabetes: father  Known complications:  DKA-  RN-  PN-  Nephropathy-     Current regimen:  Trulicity 1.5 mg weekly  Glipizide 10 mg when she wakes up (not with food)  Basaglar 15u HS     Other medications tried:  Jardiance-insurance issues     Glucose Monitor:  1 times a day testing  Log reviewed: yes, digital medicine program  Hypoglycemia: as below; did not eat and had case of diarrhea.   Knows how to correct with 15 grams of carbs-  juice, coke, or a peppermint.          Diet/Exercise: She feels that she is trying to follow diabetic diet.   Eats 1-2 meals a day.  She eats nuts in the afternoon   Main meal is vegetable and meat sometimes rice   Snacks: candy   Drinks: water  Exercise - tries to stay active. No formal exercise. Has right knee pain.      Education - last visit: none; politely declines    Denies history of pancreatitis & personal/family history of medullary thyroid cancer.    Lab Results   Component Value Date    HGBA1C 7.2 (H) 09/21/2020    HGBA1C 6.5 (H) 05/15/2020    HGBA1C 6.7 (H) 12/11/2019     Screening or Prevention Patient's value Goal Complete/Controlled?   HgA1C Testing and Control   Lab Results   Component Value Date    HGBA1C 7.2 (H) 09/21/2020      Annually/Less than 8% Yes   Lipid profile : 05/15/2020 Annually Yes   LDL control Lab Results   Component Value Date    LDLCALC 40.8 (L) 05/15/2020    Annually/Less than 100 mg/dl  Yes   Nephropathy screening Lab Results   Component Value Date    LABMICR 188.0 10/24/2017     Lab Results   Component Value Date    PROTEINUA 1+ (A) 08/28/2019    Annually No   Blood pressure BP Readings from Last 1 Encounters:   09/25/20 134/76    Less than 140/90 Yes   Dilated retinal exam : 06/27/2019 Annually Yes   Foot exam   : 10/21/2019 Annually Yes     With regards to osteoporosis:   Current meds: Fosamax  Started: 6/2019     Calcium intake?  1000 mg a day   Vit D intake?  2000 iu a day   Weight bearing exercise?   Walking   Recent falls- August 2018; September 2018 - no fractures and no falls from a standing position.       They have made fall precautions in house      No recent fractures   No significant height loss (>2 inches)     tob use -  None  etoh use- None     Family history: none     No current diarrhea or h/o malabsorption     Menopause at age 56     Denies chronic exposure to anticoagulants, proton pump inhibitors or antiseizure medications.   +Steroid injections  knees     Denies GERD, indigestion, or gastric bypass surgery.      Denies history of thyroid disease, anemia, kidney stones or kidney disease.      Denies active malignancy, history of malignancy the involved the bone, prior radiation treatment, or unexplained elevations of alk phos on labs      BMD 3/2017  Osteoporosis of the left femoral neck.  Normal bone mineral density of the lumbar spine.  Ten year probability of major osteoporotic fracture is 9.5%, and hip fracture is 2.1%.  BMD 5/23/19       COMPARISON:  Comparison study done on 03/15/2013. Lumbar spine BMD 1.138 g/cm2 and the T-score 0.8.  The Total Hip BMD 0.928 g/cm2 and the T-score -0.1.    FINDINGS:  Lumbar Spine: Lumbar bone mineral density L1-L4 is 1.069g/cm2, which is a T-score of 0.2. The Z-score is 1.7.    Total Hip: Total hip bone mineral density is 0.808g/cm2.  The T-score is -1.1, and the Z-score is -0.2.    Femoral neck: Bone mineral density is 0.576g/cm2 and the T-score is -2.5 and the Z-score is -1.1 g/cm2.    There is a 6.7% risk of a major osteoporotic fracture and a 1.6% risk of hip fracture in the next 10 years (FRAX).    Compared with previous DXA, BMD at the lumbar spine has decreased by -6%, and the BMD at the total hip has decreased by -12.9%.       Impression       Osteoporosis of the femoral neck.  RECOMMENDATIONS of [facility]:  1.  Calcium 4371-6583 mg daily and vitamin D 800-1000 units daily, adequate exercise.  2.  Consider bisphosphonates (alendronate, risedronate, ibandronate, zolendronic acid) or denosumab.  3.  Repeat BMD in 2 years      With regards to the vitamin d deficiency:  currently taking otc 2000 iu a day    Vit D, 25-Hydroxy   Date Value Ref Range Status   05/15/2020 39 30 - 96 ng/mL Final     Comment:     Vitamin D deficiency.........<10 ng/mL                              Vitamin D insufficiency......10-29 ng/mL       Vitamin D sufficiency........> or equal to 30  ng/mL  Vitamin D toxicity............>100 ng/mL       No recent fractures         Current Outpatient Medications:     alendronate (FOSAMAX) 70 MG tablet, Take 1 tablet (70 mg total) by mouth every 7 days. Take with a full glass of water & remain upright for at least 30 minutes, Disp: 12 tablet, Rfl: 3    amLODIPine (NORVASC) 10 MG tablet, Take 1 tablet (10 mg total) by mouth once daily., Disp: 90 tablet, Rfl: 2    aspirin 81 MG Chew, Take 1 tablet (81 mg total) by mouth once daily., Disp: , Rfl: 0    atorvastatin (LIPITOR) 80 MG tablet, Take 1 tablet (80 mg total) by mouth once daily., Disp: 90 tablet, Rfl: 2    blood sugar diagnostic Strp, 1 strip by Misc.(Non-Drug; Combo Route) route once daily., Disp: 100 strip, Rfl: 3    carvediloL (COREG) 25 MG tablet, Take 1 tablet (25 mg total) by mouth 2 (two) times daily with meals., Disp: 60 tablet, Rfl: 11    chlorthalidone (HYGROTEN) 25 MG Tab, , Disp: , Rfl:     clopidogreL (PLAVIX) 75 mg tablet, Take 1 tablet (75 mg total) by mouth once daily., Disp: 90 tablet, Rfl: 1    COD LIVER OIL ORAL, Take 3 capsules by mouth once daily., Disp: , Rfl:     diphenoxylate-atropine 2.5-0.025 mg (LOMOTIL) 2.5-0.025 mg per tablet, Take 1 tablet by mouth 4 (four) times daily as needed for Diarrhea., Disp: 20 tablet, Rfl: 0    dulaglutide (TRULICITY) 1.5 mg/0.5 mL pen injector, Inject 1.5 mg into the skin every 7 days., Disp: 2 mL, Rfl: 5    ferrous sulfate (FEOSOL) 325 mg (65 mg iron) Tab tablet, TAKE 1 TABLET BY MOUTH EVERY DAY, Disp: 90 tablet, Rfl: 0    glipiZIDE (GLUCOTROL) 10 MG tablet, TAKE 1 TABLET(10 MG) BY MOUTH DAILY WITH BREAKFAST, Disp: 90 tablet, Rfl: 1    insulin (BASAGLAR KWIKPEN U-100 INSULIN) glargine 100 units/mL (3mL) SubQ pen, Inject 15 Units into the skin once daily., Disp: 3 Box, Rfl: 11    irbesartan (AVAPRO) 300 MG tablet, Take 1 tablet (300 mg total) by mouth every evening., Disp: 90 tablet, Rfl: 1    lancets Misc, 1 lancet by Misc.(Non-Drug;  "Combo Route) route once daily., Disp: 100 each, Rfl: 3    multivitamin (ONE DAILY MULTIVITAMIN) per tablet, Take 1 tablet by mouth once daily., Disp: , Rfl:     nitroGLYCERIN (NITROSTAT) 0.4 MG SL tablet, Place 1 tablet (0.4 mg total) under the tongue every 5 (five) minutes as needed for Chest pain., Disp: 25 tablet, Rfl: 6    pen needle, diabetic 31 gauge x 5/16" Ndle, Use every evening., Disp: 100 each, Rfl: 11    vitamin D (VITAMIN D3) 1000 units Tab, Take 1,000 Units by mouth once daily., Disp: , Rfl:     alcohol swabs (ALCOHOL PADS) PadM, Apply 1 each topically as needed., Disp: 11 each, Rfl: 11    glucagon (GVOKE HYPOPEN 2-PACK) 1 mg/0.2 mL AtIn, Inject 1 Package into the skin as needed., Disp: 2 Syringe, Rfl: 0    Results for GERARDO HOLM (MRN [id number]) as of 9/28/2020 15:59   Ref. Range 9/21/2020 14:12   Sodium Latest Ref Range: 136 - 145 mmol/L 136   Potassium Latest Ref Range: 3.5 - 5.1 mmol/L 4.0   Chloride Latest Ref Range: 95 - 110 mmol/L 101   CO2 Latest Ref Range: 23 - 29 mmol/L 25   Anion Gap Latest Ref Range: 8 - 16 mmol/L 10   BUN, Bld Latest Ref Range: 8 - 23 mg/dL 19   Creatinine Latest Ref Range: 0.5 - 1.4 mg/dL 1.0   eGFR if non African American Latest Ref Range: >60 mL/min/1.73 m^2 56.0 (A)   eGFR if African American Latest Ref Range: >60 mL/min/1.73 m^2 >60.0   Glucose Latest Ref Range: 70 - 110 mg/dL 131 (H)   Calcium Latest Ref Range: 8.7 - 10.5 mg/dL 9.7   Alkaline Phosphatase Latest Ref Range: 55 - 135 U/L 100   PROTEIN TOTAL Latest Ref Range: 6.0 - 8.4 g/dL 8.5 (H)   Albumin Latest Ref Range: 3.5 - 5.2 g/dL 3.4 (L)   BILIRUBIN TOTAL Latest Ref Range: 0.1 - 1.0 mg/dL 0.5   AST Latest Ref Range: 10 - 40 U/L 22   ALT Latest Ref Range: 10 - 44 U/L 26   Hemoglobin A1C External Latest Ref Range: 4.0 - 5.6 % 7.2 (H)   Estimated Avg Glucose Latest Ref Range: 68 - 131 mg/dL 160 (H)       Assessment and plan:   1. Type 2 diabetes mellitus with stage 3 chronic kidney disease and " hypertension  Comprehensive metabolic panel    Hemoglobin A1C    glucagon (GVOKE HYPOPEN 2-PACK) 1 mg/0.2 mL AtIn    Microalbumin/creatinine urine ratio   2. Age-related osteoporosis without current pathological fracture     3. Vitamin D deficiency     4. Essential hypertension     5. Other hyperlipidemia         Type 2 diabetes mellitus with stage 3 chronic kidney disease and hypertension  -- Labs prior  -- A1c 7-7.5% without hypoglycemia.   -- Medication Changes:    Continue current medications   Basaglar 15u HS   Trulicity 1.5 mg weekly   Glipizide 10 mg is to be taken with a meal  -- We will prescribe novolog for steroid injections. Discussed she will take the novolog before meals with sliding scale below.  With using correction scale:  MDD of:     150-200: +2 units  201-250: +4 units  251-300: +6 units  301-350: +8 units  >350: +10 units    -- Discussed Hypoglycemia low blood sugar -less than 70. We will call in GVOKE pen. Glucagon is an emergency pen that is used to increase your blood sugar. Glucagon is a pen that is used in an emergency situation where you are unable to eat or drink anything. Glucagon is a medication that is given by someone else-spouse, child, etc.   -- She is fearful of DKA Discussed signs and symptoms of DKA and instructed to buy ketone strips  -- Reviewed goals of therapy are to get the best control we can without hypoglycemia  -- Insulin injection sites and proper rotation instructed.    -- Advised frequent self blood glucose monitoring.  Patient encouraged to document glucose results and bring them to every clinic visit.  -- Hypoglycemia precautions discussed. Instructed on precautions before driving.    -- Call for Bg repeatedly < 90 or > 180.   -- Close adherence to lifestyle changes recommended.   -- Periodic follow ups for eye evaluations, foot care and dental care suggested. UTD    Age-related osteoporosis without current pathological fracture  -- Continue fosamax weekly  --  Reviewed basics of quantity versus quality  -- Reassuring she is not fracturing   -- RDA of calcium (6825-4195 mg /day in divided doses) and vitamin D 2000iu a day  discussed  -- Calcium from food sources preferred  -- Fall precautions emphasized  -- +weight bearing exercise  -- Repeat DEXA scan in 2021    Vitamin D deficiency  Continue vit d daily    Essential hypertension  -- on ARB  -- Controlled  -- Blood pressure goals discussed with patient    Hyperlipidemia  -- Controlled   -- On statin per ADA recommendations      Follow up in about 3 months (around 12/28/2020).    I spent 17 minutes face-to-face with the patient, over half of the visit was spent oncounseling and/or coordinating the care of the patient.    Counseling includes:  Diagnostic results, impressions, recommendations   Prognosis   Risk and benefits of management/treatment options   Instructions for management treatment and or follow-up   Importance of compliance with management   Risk factor reduction   Patient education

## 2020-09-27 RX ORDER — AMLODIPINE BESYLATE 10 MG/1
10 TABLET ORAL DAILY
Qty: 90 TABLET | Refills: 2 | Status: SHIPPED | OUTPATIENT
Start: 2020-09-27 | End: 2021-05-23 | Stop reason: SDUPTHER

## 2020-09-27 RX ORDER — ATORVASTATIN CALCIUM 80 MG/1
80 TABLET, FILM COATED ORAL DAILY
Qty: 90 TABLET | Refills: 2 | Status: SHIPPED | OUTPATIENT
Start: 2020-09-27 | End: 2021-05-23 | Stop reason: SDUPTHER
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
## 2020-09-28 ENCOUNTER — PATIENT OUTREACH (OUTPATIENT)
Dept: OTHER | Facility: OTHER | Age: 75
End: 2020-09-28

## 2020-09-28 ENCOUNTER — OFFICE VISIT (OUTPATIENT)
Dept: ENDOCRINOLOGY | Facility: CLINIC | Age: 75
End: 2020-09-28
Payer: MEDICARE

## 2020-09-28 DIAGNOSIS — M81.0 AGE-RELATED OSTEOPOROSIS WITHOUT CURRENT PATHOLOGICAL FRACTURE: ICD-10-CM

## 2020-09-28 DIAGNOSIS — N18.30 TYPE 2 DIABETES MELLITUS WITH STAGE 3 CHRONIC KIDNEY DISEASE AND HYPERTENSION: ICD-10-CM

## 2020-09-28 DIAGNOSIS — I12.9 TYPE 2 DIABETES MELLITUS WITH STAGE 3 CHRONIC KIDNEY DISEASE AND HYPERTENSION: ICD-10-CM

## 2020-09-28 DIAGNOSIS — E55.9 VITAMIN D DEFICIENCY: ICD-10-CM

## 2020-09-28 DIAGNOSIS — I10 ESSENTIAL HYPERTENSION: ICD-10-CM

## 2020-09-28 DIAGNOSIS — E11.22 TYPE 2 DIABETES MELLITUS WITH STAGE 3 CHRONIC KIDNEY DISEASE AND HYPERTENSION: ICD-10-CM

## 2020-09-28 DIAGNOSIS — E78.49 OTHER HYPERLIPIDEMIA: ICD-10-CM

## 2020-09-28 PROCEDURE — 1159F PR MEDICATION LIST DOCUMENTED IN MEDICAL RECORD: ICD-10-PCS | Mod: 95,,, | Performed by: NURSE PRACTITIONER

## 2020-09-28 PROCEDURE — 3051F PR MOST RECENT HEMOGLOBIN A1C LEVEL 7.0 - < 8.0%: ICD-10-PCS | Mod: CPTII,95,, | Performed by: NURSE PRACTITIONER

## 2020-09-28 PROCEDURE — 99214 PR OFFICE/OUTPT VISIT, EST, LEVL IV, 30-39 MIN: ICD-10-PCS | Mod: 95,,, | Performed by: NURSE PRACTITIONER

## 2020-09-28 PROCEDURE — 3051F HG A1C>EQUAL 7.0%<8.0%: CPT | Mod: CPTII,95,, | Performed by: NURSE PRACTITIONER

## 2020-09-28 PROCEDURE — 1159F MED LIST DOCD IN RCRD: CPT | Mod: 95,,, | Performed by: NURSE PRACTITIONER

## 2020-09-28 PROCEDURE — 1101F PT FALLS ASSESS-DOCD LE1/YR: CPT | Mod: CPTII,95,, | Performed by: NURSE PRACTITIONER

## 2020-09-28 PROCEDURE — 1101F PR PT FALLS ASSESS DOC 0-1 FALLS W/OUT INJ PAST YR: ICD-10-PCS | Mod: CPTII,95,, | Performed by: NURSE PRACTITIONER

## 2020-09-28 PROCEDURE — 99214 OFFICE O/P EST MOD 30 MIN: CPT | Mod: 95,,, | Performed by: NURSE PRACTITIONER

## 2020-09-28 RX ORDER — GLUCAGON INJECTION, SOLUTION 1 MG/.2ML
1 INJECTION, SOLUTION SUBCUTANEOUS
Qty: 2 SYRINGE | Refills: 0 | Status: SHIPPED | OUTPATIENT
Start: 2020-09-28

## 2020-09-28 NOTE — ASSESSMENT & PLAN NOTE
-- Labs prior  -- A1c 7-7.5% without hypoglycemia.   -- Medication Changes:    Continue current medications   Basaglar 15u HS   Trulicity 1.5 mg weekly   Glipizide 10 mg is to be taken with a meal  -- We will prescribe novolog for steroid injections. Discussed she will take the novolog before meals with sliding scale below.  With using correction scale:  MDD of:     150-200: +2 units  201-250: +4 units  251-300: +6 units  301-350: +8 units  >350: +10 units    -- Discussed Hypoglycemia low blood sugar -less than 70. We will call in GVOKE pen. Glucagon is an emergency pen that is used to increase your blood sugar. Glucagon is a pen that is used in an emergency situation where you are unable to eat or drink anything. Glucagon is a medication that is given by someone else-spouse, child, etc.   -- She is fearful of DKA Discussed signs and symptoms of DKA and instructed to buy ketone strips  -- Reviewed goals of therapy are to get the best control we can without hypoglycemia  -- Insulin injection sites and proper rotation instructed.    -- Advised frequent self blood glucose monitoring.  Patient encouraged to document glucose results and bring them to every clinic visit.  -- Hypoglycemia precautions discussed. Instructed on precautions before driving.    -- Call for Bg repeatedly < 90 or > 180.   -- Close adherence to lifestyle changes recommended.   -- Periodic follow ups for eye evaluations, foot care and dental care suggested. UTD

## 2020-09-28 NOTE — PROGRESS NOTES
[facility]: Health  Follow-Up    The history is provided by the patient.               Care Team received low BG alert.          Topics Covered on Call: Diet and steroid shot    Additional Follow-up details: Patient reports she did not feel well all weekend.  Patient's BG readings were elevated which was causing angina and diarrhea.  Patient states she did not eat anything all weekend, which caused lower BG readings of 43mg/dL and 56mg/dL on 9/27.      States she received steroid shot on 9/24.  States just got off the phone with Endo and reports feeling better today.          Diet-Not assessed          Physical Activity-Not assessed    Medication Adherence-Medication Adherence not addressed.      Substance, Sleep, Stress-Not assessed      Provided patient education.       Addressed patient questions and patient has my contact information if needed prior to next outreach. Patient verbalizes understanding.      Explained the importance of self-monitoring and medication adherence. Encouraged the patient to communicate with their health  for lifestyle modifications to help improve or maintain a healthy lifestyle.                Topic    Eye Exam        Last 5 Patient Entered Readings                                      Current 30 Day Average: 123/76     Recent Readings 9/27/2020 9/23/2020 9/20/2020 9/18/2020 9/15/2020    SBP (mmHg) 118 109 131 122 140    DBP (mmHg) 72 68 70 82 88    Pulse 76 86 82 77 80        Last 6 Patient Entered Readings                                          Most Recent A1c: 7.2% on 9/21/2020  (Goal: 8%)     Recent Readings 9/28/2020 9/27/2020 9/27/2020 9/27/2020 9/27/2020    Blood Glucose (mg/dL) 81 173 56 43 175

## 2020-09-28 NOTE — PATIENT INSTRUCTIONS
Osteoporosis    Continue Fosamax     Vit D    Continue Vit D 2000 IU daily    Diabetes   Continue current medications   Glipizide 10 mg is to be taken with a meal   I will prescribe novolog for steroid injections. You will take the novolog before meals with sliding scale below.  With using correction scale:  MDD of:     150-200: +2 units  201-250: +4 units  251-300: +6 units  301-350: +8 units  >350: +10 units    Hypoglycemia low blood sugar -less than 70. I will call in GVOKE pen. Glucagon is an emergency pen that is used to increase your blood sugar. Glucagon is a pen that is used in an emergency situation where you are unable to eat or drink anything. Glucagon is a medication that is given by someone else-spouse, child, etc.     Hyperglycemia is blood sugar persistently above 350 and you have nausea, vomiting, and diarrhea You can go buy ketone strips and determine if you are in DKA

## 2020-09-28 NOTE — ASSESSMENT & PLAN NOTE
-- Continue fosamax weekly  -- Reviewed basics of quantity versus quality  -- Reassuring she is not fracturing   -- RDA of calcium (7022-7432 mg /day in divided doses) and vitamin D 2000iu a day  discussed  -- Calcium from food sources preferred  -- Fall precautions emphasized  -- +weight bearing exercise  -- Repeat DEXA scan in 2021

## 2020-10-05 ENCOUNTER — OFFICE VISIT (OUTPATIENT)
Dept: PODIATRY | Facility: CLINIC | Age: 75
End: 2020-10-05
Payer: MEDICARE

## 2020-10-05 VITALS
HEIGHT: 62 IN | HEART RATE: 80 BPM | DIASTOLIC BLOOD PRESSURE: 59 MMHG | SYSTOLIC BLOOD PRESSURE: 129 MMHG | WEIGHT: 240.75 LBS | BODY MASS INDEX: 44.3 KG/M2

## 2020-10-05 DIAGNOSIS — B35.1 DERMATOPHYTOSIS OF NAIL: ICD-10-CM

## 2020-10-05 DIAGNOSIS — E11.49 TYPE II DIABETES MELLITUS WITH NEUROLOGICAL MANIFESTATIONS: ICD-10-CM

## 2020-10-05 DIAGNOSIS — L84 CORN OR CALLUS: Primary | ICD-10-CM

## 2020-10-05 PROCEDURE — 99999 PR PBB SHADOW E&M-EST. PATIENT-LVL V: ICD-10-PCS | Mod: PBBFAC,,, | Performed by: PODIATRIST

## 2020-10-05 PROCEDURE — 11056 ROUTINE FOOT CARE: ICD-10-PCS | Mod: Q9,S$GLB,, | Performed by: PODIATRIST

## 2020-10-05 PROCEDURE — 11721 ROUTINE FOOT CARE: ICD-10-PCS | Mod: 59,Q9,S$GLB, | Performed by: PODIATRIST

## 2020-10-05 PROCEDURE — 99499 NO LOS: ICD-10-PCS | Mod: S$GLB,,, | Performed by: PODIATRIST

## 2020-10-05 PROCEDURE — 11056 PARNG/CUTG B9 HYPRKR LES 2-4: CPT | Mod: Q9,S$GLB,, | Performed by: PODIATRIST

## 2020-10-05 PROCEDURE — 99499 UNLISTED E&M SERVICE: CPT | Mod: S$GLB,,, | Performed by: PODIATRIST

## 2020-10-05 PROCEDURE — 99999 PR PBB SHADOW E&M-EST. PATIENT-LVL V: CPT | Mod: PBBFAC,,, | Performed by: PODIATRIST

## 2020-10-05 PROCEDURE — 11721 DEBRIDE NAIL 6 OR MORE: CPT | Mod: 59,Q9,S$GLB, | Performed by: PODIATRIST

## 2020-10-05 NOTE — PROGRESS NOTES
Subjective:       Patient ID: Kaylee Romero is a 75 y.o. female.    Chief Complaint: Diabetes Mellitus (Endo Abbey Fox NP 9/28/20. A1c 7.2 9/21/2020)        Kaylee Romero is a 75 y.o. female    has a past medical history of Acid reflux, Age-related osteoporosis without current pathological fracture, Cataract, CKD (chronic kidney disease) stage 3, GFR 30-59 ml/min, Dry mouth, History of TIA (transient ischemic attack), Hyperlipidemia, Hypertensive heart disease with diastolic heart failure, Morbid obesity with body mass index (BMI) of 40.0 or higher, KAMRAN (obstructive sleep apnea), KAMRAN on CPAP, Osteoporosis without current pathological fracture, Physical deconditioning, Status post total left knee replacement 5/1/2017, and Type 2 diabetes mellitus with stage 3 chronic kidney disease, without long-term current use of insulin.         PCP:  Liz Roberts MD, her last visit with her PCP was on September 25, 2020.  Patient last saw Abbey Fox NP, for management of diabetes.  Last visit was on 09/20/2020.            Patient Active Problem List   Diagnosis    Osteoarthritis of knee    Essential hypertension    Hyperlipidemia    Adrenal cortical steroids causing adverse effect in therapeutic use    Morbid obesity with body mass index (BMI) of 40.0 or higher    KAMRAN on CPAP    CKD (chronic kidney disease) stage 3, GFR 30-59 ml/min    Physical deconditioning    Osteoporosis without current pathological fracture    History of TIA (transient ischemic attack)    Age-related osteoporosis without current pathological fracture    Vitamin D deficiency    Type 2 diabetes mellitus with stage 3 chronic kidney disease and hypertension    History of MI (myocardial infarction)    CAD (coronary artery disease)    S/P CABG (coronary artery bypass graft)    Acute blood loss anemia    Coronary artery disease involving coronary bypass graft of native heart without angina pectoris    Shoulder pain     Chest pain    Physical debility    NSTEMI (non-ST elevated myocardial infarction)    Shortness of breath                 Current Outpatient Medications on File Prior to Visit   Medication Sig Dispense Refill    alendronate (FOSAMAX) 70 MG tablet Take 1 tablet (70 mg total) by mouth every 7 days. Take with a full glass of water & remain upright for at least 30 minutes 12 tablet 3    amLODIPine (NORVASC) 10 MG tablet Take 1 tablet (10 mg total) by mouth once daily. 90 tablet 2    aspirin 81 MG Chew Take 1 tablet (81 mg total) by mouth once daily.  0    atorvastatin (LIPITOR) 80 MG tablet Take 1 tablet (80 mg total) by mouth once daily. 90 tablet 2    blood sugar diagnostic Strp 1 strip by Misc.(Non-Drug; Combo Route) route once daily. 100 strip 3    carvediloL (COREG) 25 MG tablet Take 1 tablet (25 mg total) by mouth 2 (two) times daily with meals. 60 tablet 11    chlorthalidone (HYGROTEN) 25 MG Tab       clopidogreL (PLAVIX) 75 mg tablet Take 1 tablet (75 mg total) by mouth once daily. 90 tablet 1    COD LIVER OIL ORAL Take 3 capsules by mouth once daily.      diphenoxylate-atropine 2.5-0.025 mg (LOMOTIL) 2.5-0.025 mg per tablet Take 1 tablet by mouth 4 (four) times daily as needed for Diarrhea. 20 tablet 0    dulaglutide (TRULICITY) 1.5 mg/0.5 mL pen injector Inject 1.5 mg into the skin every 7 days. 2 mL 5    ferrous sulfate (FEOSOL) 325 mg (65 mg iron) Tab tablet TAKE 1 TABLET BY MOUTH EVERY DAY 90 tablet 0    glipiZIDE (GLUCOTROL) 10 MG tablet TAKE 1 TABLET(10 MG) BY MOUTH DAILY WITH BREAKFAST 90 tablet 1    glucagon (GVOKE HYPOPEN 2-PACK) 1 mg/0.2 mL AtIn Inject 1 Package into the skin as needed. 2 Syringe 0    insulin (BASAGLAR KWIKPEN U-100 INSULIN) glargine 100 units/mL (3mL) SubQ pen Inject 15 Units into the skin once daily. 3 Box 11    irbesartan (AVAPRO) 300 MG tablet Take 1 tablet (300 mg total) by mouth every evening. 90 tablet 1    lancets Misc 1 lancet by Misc.(Non-Drug; Combo  "Route) route once daily. 100 each 3    multivitamin (ONE DAILY MULTIVITAMIN) per tablet Take 1 tablet by mouth once daily.      nitroGLYCERIN (NITROSTAT) 0.4 MG SL tablet Place 1 tablet (0.4 mg total) under the tongue every 5 (five) minutes as needed for Chest pain. 25 tablet 6    pen needle, diabetic 31 gauge x 5/16" Ndle Use every evening. 100 each 11    vitamin D (VITAMIN D3) 1000 units Tab Take 1,000 Units by mouth once daily.      alcohol swabs (ALCOHOL PADS) PadM Apply 1 each topically as needed. 11 each 11     No current facility-administered medications on file prior to visit.          Review of patient's allergies indicates:   Allergen Reactions    Keflex [cephalexin] Other (See Comments)     Turns orange    Bactrim [sulfamethoxazole-trimethoprim]      Generic version  Sulfa makes her sick         Social History     Socioeconomic History    Marital status:      Spouse name: Not on file    Number of children: 1    Years of education: Not on file    Highest education level: Not on file   Occupational History    Not on file   Social Needs    Financial resource strain: Not hard at all    Food insecurity     Worry: Never true     Inability: Never true    Transportation needs     Medical: No     Non-medical: No   Tobacco Use    Smoking status: Never Smoker    Smokeless tobacco: Never Used   Substance and Sexual Activity    Alcohol use: Yes     Alcohol/week: 1.0 standard drinks     Types: 1 Shots of liquor per week     Frequency: Monthly or less     Drinks per session: 1 or 2     Binge frequency: Never     Comment: rare    Drug use: No    Sexual activity: Yes     Partners: Male     Birth control/protection: Post-menopausal   Lifestyle    Physical activity     Days per week: 0 days     Minutes per session: 0 min    Stress: Not at all   Relationships    Social connections     Talks on phone: More than three times a week     Gets together: Never     Attends Caoda[employer] service: Not on file " "    Active member of club or organization: No     Attends meetings of clubs or organizations: Never     Relationship status:    Other Topics Concern    Not on file   Social History Narrative     with a son living locally.  at [employer], Retired 5/18.           Review of Systems   Constitutional: Negative.    HENT: Negative.    Eyes: Negative.    Respiratory: Negative.    Cardiovascular: Negative.    Gastrointestinal: Negative.    Endocrine: Negative.    Genitourinary: Negative.    Musculoskeletal: Negative.    Skin: Negative.    Allergic/Immunologic: Negative.    Neurological: Negative.    Hematological: Negative.    Psychiatric/Behavioral: Negative.        Objective:        Vitals:    10/05/20 1417   BP: (!) 129/59   BP Location: Left arm   Patient Position: Sitting   BP Method: Large (Automatic)   Pulse: 80   Weight: 109.2 kg (240 lb 11.9 oz)   Height: 5' 2" (1.575 m)         Physical Exam  Constitutional:       Appearance: She is well-developed.   Cardiovascular:      Comments: DP and PT pulses 2/4 loreta.   Edema noted loreta.   Capillary refill time < 3 seconds to digits 1-5, loreta.   Absent hair growth      Musculoskeletal: Normal range of motion.         General: Deformity present. No tenderness.      Comments: HAV noted to loreta 1st MPJ. 5/5 strength to all LE muscle groups. No POP noted     Skin:     Capillary Refill: Capillary refill takes 2 to 3 seconds.      Findings: No erythema.      Comments: Toenails x 10 are elongated by 1-3mm, thickened by 1-3mm and mycotic with subungual debris      Neurological:      Mental Status: She is alert and oriented to person, place, and time.      Comments: +paresthesias (Abnormal spontaneous sensations in feet)     Psychiatric:         Behavior: Behavior normal.         Thought Content: Thought content normal.         Judgment: Judgment normal.                 Assessment:       Problem List Items Addressed This Visit     None      Visit " Diagnoses     Corn or callus    -  Primary    Type II diabetes mellitus with neurological manifestations        Dermatophytosis of nail                Plan:              -I counseled the pt on her condition and management    -Shoe inspection. Diabetic Foot Education. Patient reminded of the importance of good nutrition and blood sugar control to help prevent podiatric complications of diabetes. Patient instructed on proper foot hygeine. We discussed wearing proper shoe gear, daily foot inspections, never walking without protective shoe gear    -Continue Gold bond lotion for diabetics for dry skin on the feet.  Using daily.       Routine Foot Care    Date/Time: 10/5/2020 2:15 PM  Performed by: Donna Gillis DPM  Authorized by: Donna Gillis DPM     Consent Done?:  Yes (Verbal)  Hyperkeratotic Skin Lesions?: Yes    Number of trimmed lesions:  2  Location(s):  Left 1st Toe and Right 1st Toe    Nail Care Type:  Debride  Location(s): All  (Left 1st Toe, Left 3rd Toe, Left 2nd Toe, Left 4th Toe, Left 5th Toe, Right 1st Toe, Right 2nd Toe, Right 3rd Toe, Right 4th Toe and Right 5th Toe)  Patient tolerance:  Patient tolerated the procedure well with no immediate complications     With patient's permission, the toenails mentioned above were aggressively reduced and debrided using a nail nipper, removing all offending nail and debris. Utilizing a #15 scalpel, I trimmed the corns and calluses at the above mentioned location.      The patient will continue to monitor the areas daily, inspect the feet, wear protective shoe gear when ambulatory, and moisturizer to maintain skin integrity.        Follow up in about 3 months (around 1/5/2021).          Donna Gillis DPM  NPI: [id number]         [facility] - POD[facility]AT[facility]  [address] [address]  Dept: [phone]  Dept Fax: [phone]              Donna Gillis DPM  NPI: [id number]         [facility] -  PODIATRY  [address] [address]  Dept: [phone]  Dept Fax: [phone]

## 2020-10-05 NOTE — PATIENT INSTRUCTIONS
How to Check Your Feet    Below are tips to help you look for foot problems. Try to check your feet at the same time each day, such as when you get out of bed in the morning.    · Check the top of each foot. The tops of toes, back of the heel, and outer edge of the foot can get a lot of rubbing from poor-fitting shoes.    · Check the bottom of each foot. Daily wear and tear often leads to problems at pressure spots.    · Check the toes and nails. Fungal infections often occur between toes. Toenail problems can also be a sign of fungal infections or lead to breaks in the skin.    · Check your shoes, too. Loose objects inside a shoe can injure the foot. Use your hand to feel inside your shoes for things like gissell, loose stitching, or rough areas that could irritate your skin.        Diabetic Foot Care    Diabetes can lead to a number of different foot complications. Fortunately, most of these complications can be prevented with a little extra foot care. If diabetes is not well controlled, the high blood sugar can cause damage to blood vessels and result in poor circulation to the foot. When the skin does not get enough blood flow, it becomes prone to pressure sores and ulcers, which heal slowly.  High blood sugar can also damage nerves, interfering with the ability to feel pain and pressure. When you cant feel your foot normally, it is easy to injure your skin, bones and joints without knowing it. For these reasons diabetes increases the risk of fungal infections, bunions and ulcers. Deep ulcers can lead to bone infection. Gangrene is the most serious foot complication of diabetes. It usually occurs on the tips of the toes as blacked areas of skin. The black area is dead tissue. In severe cases, gangrene spreads to involve the entire toe, other toes and the entire foot. Foot or toe amputation may be required. Good foot care and blood sugar control can prevent this.    Home Care  1. Wear comfortable, proper fitting  shoes.  2. Wash your feet daily with warm water and mild soap.  3. After drying, apply a moisturizing cream or lotion.  4. Check your feet daily for skin breaks, blisters, swelling, or redness. Look between your toes also.  5. Wear cotton socks and change them every day.  6. Trim toe nails carefully and do not cut your cuticles.  7. Strive to keep your blood sugar under control with a combination of medicines, diet and activity.  8. If you smoke and have diabetes, it is very important that you stop. Smoking reduces blood flow to your foot.  9. Avoid activities that increase your risk of foot injury:  · Do not walk barefoot.  · Do not use heating pads or hot water bottles on your feet.  · Do not put your foot in a hot tub without first checking the temperature with your hand.  10) Schedule yearly foot exams.    Follow Up  with your doctor or as advised by our staff. Report any cut, puncture, scrape, other injury, blister, ingrown toenail or ulcer on your foot.    Get Prompt Medical Attention  if any of the following occur:  -- Open ulcer with pus draining from the wound  -- Increasing foot or leg pain  -- New areas of redness or swelling or tender areas of the foot    © 0045-5140 [employer]. [address] [place] [address], [place] [address]. All rights reserved. This information is not intended as a substitute for professional medical care. Always follow your healthcare professional's instructions.      General nail care measures for abnormal nails include:    ? Keeping nails trimmed short    ? Avoiding trauma     ? Avoiding contact irritants     ? Keeping nails dry (avoiding wet work)    ? Avoiding all nail cosmetics

## 2020-10-06 ENCOUNTER — TELEPHONE (OUTPATIENT)
Dept: PRIMARY CARE CLINIC | Facility: CLINIC | Age: 75
End: 2020-10-06

## 2020-10-06 RX ORDER — PEN NEEDLE, DIABETIC 30 GX3/16"
1 NEEDLE, DISPOSABLE MISCELLANEOUS NIGHTLY
Qty: 100 EACH | Refills: 11 | Status: SHIPPED | OUTPATIENT
Start: 2020-10-06 | End: 2020-10-28

## 2020-10-06 NOTE — TELEPHONE ENCOUNTER
----- Message from Grisel Esparza sent at 10/6/2020 10:00 AM CDT -----  Regarding: med request  Contact: Patient [phone]  Patient is requesting Rx for extended Nitro.  Please call    [id number] - [facility] [address] [facility] [phone] (Phone)  [phone] (Fax)

## 2020-10-06 NOTE — TELEPHONE ENCOUNTER
I can't start her on long acting nitroglycerin, she has to have a conversation with Cardiology about that.   And I would need to discuss her anxiety and memory issues before deciding on treatment - schedule a virtual visit.

## 2020-10-06 NOTE — TELEPHONE ENCOUNTER
Pt advised to discuss long acting nitro with cardio and she is scheduled for a virtual on 10/15 for 2pm to discuss anxiety and memory

## 2020-10-06 NOTE — TELEPHONE ENCOUNTER
Pt state she will take the extended release nitro that you recommend she take while in the clinic on 9/25/20  Pt is requesting something for her memory and anxiety. Pt state she get overwhelmed when she hear loud noises.

## 2020-10-15 ENCOUNTER — OFFICE VISIT (OUTPATIENT)
Dept: PRIMARY CARE CLINIC | Facility: CLINIC | Age: 75
End: 2020-10-15
Payer: MEDICARE

## 2020-10-15 DIAGNOSIS — F41.9 ANXIETY, MILD: Primary | ICD-10-CM

## 2020-10-15 DIAGNOSIS — Z79.4 TYPE 2 DIABETES MELLITUS WITH STAGE 2 CHRONIC KIDNEY DISEASE, WITH LONG-TERM CURRENT USE OF INSULIN: ICD-10-CM

## 2020-10-15 DIAGNOSIS — I25.118 CORONARY ARTERY DISEASE OF NATIVE ARTERY OF NATIVE HEART WITH STABLE ANGINA PECTORIS: ICD-10-CM

## 2020-10-15 DIAGNOSIS — N18.2 TYPE 2 DIABETES MELLITUS WITH STAGE 2 CHRONIC KIDNEY DISEASE, WITH LONG-TERM CURRENT USE OF INSULIN: ICD-10-CM

## 2020-10-15 DIAGNOSIS — G31.84 MILD COGNITIVE IMPAIRMENT: ICD-10-CM

## 2020-10-15 DIAGNOSIS — E11.22 TYPE 2 DIABETES MELLITUS WITH STAGE 2 CHRONIC KIDNEY DISEASE, WITH LONG-TERM CURRENT USE OF INSULIN: ICD-10-CM

## 2020-10-15 PROCEDURE — 99213 OFFICE O/P EST LOW 20 MIN: CPT | Mod: 95,,, | Performed by: INTERNAL MEDICINE

## 2020-10-15 PROCEDURE — 1159F PR MEDICATION LIST DOCUMENTED IN MEDICAL RECORD: ICD-10-PCS | Mod: 95,,, | Performed by: INTERNAL MEDICINE

## 2020-10-15 PROCEDURE — 3051F HG A1C>EQUAL 7.0%<8.0%: CPT | Mod: CPTII,95,, | Performed by: INTERNAL MEDICINE

## 2020-10-15 PROCEDURE — 99213 PR OFFICE/OUTPT VISIT, EST, LEVL III, 20-29 MIN: ICD-10-PCS | Mod: 95,,, | Performed by: INTERNAL MEDICINE

## 2020-10-15 PROCEDURE — 1159F MED LIST DOCD IN RCRD: CPT | Mod: 95,,, | Performed by: INTERNAL MEDICINE

## 2020-10-15 PROCEDURE — 1101F PR PT FALLS ASSESS DOC 0-1 FALLS W/OUT INJ PAST YR: ICD-10-PCS | Mod: CPTII,95,, | Performed by: INTERNAL MEDICINE

## 2020-10-15 PROCEDURE — 1101F PT FALLS ASSESS-DOCD LE1/YR: CPT | Mod: CPTII,95,, | Performed by: INTERNAL MEDICINE

## 2020-10-15 PROCEDURE — 3051F PR MOST RECENT HEMOGLOBIN A1C LEVEL 7.0 - < 8.0%: ICD-10-PCS | Mod: CPTII,95,, | Performed by: INTERNAL MEDICINE

## 2020-10-15 RX ORDER — BUSPIRONE HYDROCHLORIDE 5 MG/1
5 TABLET ORAL 2 TIMES DAILY
Qty: 60 TABLET | Refills: 2 | Status: SHIPPED | OUTPATIENT
Start: 2020-10-15 | End: 2021-01-11 | Stop reason: SDUPTHER

## 2020-10-15 RX ORDER — DONEPEZIL HYDROCHLORIDE 5 MG/1
5 TABLET, FILM COATED ORAL NIGHTLY
Qty: 30 TABLET | Refills: 2 | Status: SHIPPED | OUTPATIENT
Start: 2020-10-15 | End: 2021-01-11 | Stop reason: SDUPTHER
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
## 2020-10-19 ENCOUNTER — PATIENT OUTREACH (OUTPATIENT)
Dept: OTHER | Facility: OTHER | Age: 75
End: 2020-10-19

## 2020-10-19 NOTE — PROGRESS NOTES
"Digital Medicine: Health  Follow-Up    The history is provided by the patient.               Care Team received low BG alert.        Additional Follow-up details: Following up about 2 low BG alerts.  One from 10/10 of 59 mg/dL and one from 10/18 of 65 mg/dL.  Patient denies hypoglycemic symptoms.  Reports she ate after low readings and noticed it came up.  States she is feeling well.            Diet-no change to diet    No change to diet.  Patient reports eating or drinking the following: Reports she continues to eat "healthy."      Physical Activity-Not assessed    Medication Adherence-Medication Adherence not addressed.      Substance, Sleep, Stress-Not assessed      Continue current diet/physical activity routine.       Addressed patient questions and patient has my contact information if needed prior to next outreach. Patient verbalizes understanding.      Explained the importance of self-monitoring and medication adherence. Encouraged the patient to communicate with their health  for lifestyle modifications to help improve or maintain a healthy lifestyle.                   Topic    Eye Exam          Last 5 Patient Entered Readings                                      Current 30 Day Average: 108/67     Recent Readings 10/19/2020 10/18/2020 10/16/2020 10/15/2020 10/10/2020    SBP (mmHg) 107 103 99 100 111    DBP (mmHg) 70 64 67 71 64    Pulse 79 88 85 91 91        Last 6 Patient Entered Readings                                          Most Recent A1c: 7.2% on 9/21/2020  (Goal: 8%)     Recent Readings 10/19/2020 10/18/2020 10/18/2020 10/16/2020 10/10/2020    Blood Glucose (mg/dL) 104 102 65 78 78             "

## 2020-10-26 ENCOUNTER — OFFICE VISIT (OUTPATIENT)
Dept: OPTOMETRY | Facility: CLINIC | Age: 75
End: 2020-10-26
Payer: MEDICARE

## 2020-10-26 ENCOUNTER — PATIENT OUTREACH (OUTPATIENT)
Dept: ADMINISTRATIVE | Facility: OTHER | Age: 75
End: 2020-10-26

## 2020-10-26 ENCOUNTER — PATIENT OUTREACH (OUTPATIENT)
Dept: OTHER | Facility: OTHER | Age: 75
End: 2020-10-26

## 2020-10-26 DIAGNOSIS — G47.33 OSA ON CPAP: ICD-10-CM

## 2020-10-26 DIAGNOSIS — E11.9 TYPE 2 DIABETES MELLITUS WITHOUT OPHTHALMIC MANIFESTATIONS: ICD-10-CM

## 2020-10-26 DIAGNOSIS — H35.363 RETINAL DRUSEN OF BOTH EYES: ICD-10-CM

## 2020-10-26 DIAGNOSIS — H25.13 NS (NUCLEAR SCLEROSIS), BILATERAL: ICD-10-CM

## 2020-10-26 DIAGNOSIS — H43.813 PVD (POSTERIOR VITREOUS DETACHMENT), BILATERAL: ICD-10-CM

## 2020-10-26 DIAGNOSIS — I10 ESSENTIAL HYPERTENSION: ICD-10-CM

## 2020-10-26 DIAGNOSIS — H04.123 DRY EYE SYNDROME, BILATERAL: Primary | ICD-10-CM

## 2020-10-26 DIAGNOSIS — H18.413 ARCUS SENILIS OF BOTH CORNEAS: ICD-10-CM

## 2020-10-26 PROCEDURE — 99999 PR PBB SHADOW E&M-EST. PATIENT-LVL III: CPT | Mod: PBBFAC,,, | Performed by: OPTOMETRIST

## 2020-10-26 PROCEDURE — 99999 PR PBB SHADOW E&M-EST. PATIENT-LVL III: ICD-10-PCS | Mod: PBBFAC,,, | Performed by: OPTOMETRIST

## 2020-10-26 PROCEDURE — 92014 COMPRE OPH EXAM EST PT 1/>: CPT | Mod: S$GLB,,, | Performed by: OPTOMETRIST

## 2020-10-26 PROCEDURE — 92014 PR EYE EXAM, EST PATIENT,COMPREHESV: ICD-10-PCS | Mod: S$GLB,,, | Performed by: OPTOMETRIST

## 2020-10-27 NOTE — PROGRESS NOTES
Chart reviewed.   Immunizations: updated  Orders placed: n/a  Upcoming appts to satisfy [facility] topics: Optometry 12/01

## 2020-10-28 ENCOUNTER — OFFICE VISIT (OUTPATIENT)
Dept: CARDIOLOGY | Facility: CLINIC | Age: 75
End: 2020-10-28
Payer: MEDICARE

## 2020-10-28 ENCOUNTER — OFFICE VISIT (OUTPATIENT)
Dept: ENDOCRINOLOGY | Facility: CLINIC | Age: 75
End: 2020-10-28
Payer: MEDICARE

## 2020-10-28 VITALS
HEIGHT: 63 IN | BODY MASS INDEX: 42.35 KG/M2 | SYSTOLIC BLOOD PRESSURE: 148 MMHG | WEIGHT: 239 LBS | OXYGEN SATURATION: 100 % | HEART RATE: 74 BPM | DIASTOLIC BLOOD PRESSURE: 68 MMHG

## 2020-10-28 VITALS
BODY MASS INDEX: 44.04 KG/M2 | WEIGHT: 239.31 LBS | HEIGHT: 62 IN | SYSTOLIC BLOOD PRESSURE: 130 MMHG | DIASTOLIC BLOOD PRESSURE: 74 MMHG

## 2020-10-28 DIAGNOSIS — E55.9 VITAMIN D DEFICIENCY: ICD-10-CM

## 2020-10-28 DIAGNOSIS — N18.31 STAGE 3A CHRONIC KIDNEY DISEASE: ICD-10-CM

## 2020-10-28 DIAGNOSIS — G47.33 OSA ON CPAP: ICD-10-CM

## 2020-10-28 DIAGNOSIS — E11.59 OBESITY, DIABETES, AND HYPERTENSION SYNDROME: ICD-10-CM

## 2020-10-28 DIAGNOSIS — E11.69 OBESITY, DIABETES, AND HYPERTENSION SYNDROME: ICD-10-CM

## 2020-10-28 DIAGNOSIS — E66.9 OBESITY, DIABETES, AND HYPERTENSION SYNDROME: ICD-10-CM

## 2020-10-28 DIAGNOSIS — E11.22 TYPE 2 DIABETES MELLITUS WITH STAGE 3 CHRONIC KIDNEY DISEASE AND HYPERTENSION: Primary | ICD-10-CM

## 2020-10-28 DIAGNOSIS — M81.0 AGE-RELATED OSTEOPOROSIS WITHOUT CURRENT PATHOLOGICAL FRACTURE: ICD-10-CM

## 2020-10-28 DIAGNOSIS — I15.2 OBESITY, DIABETES, AND HYPERTENSION SYNDROME: ICD-10-CM

## 2020-10-28 DIAGNOSIS — E11.22 TYPE 2 DIABETES MELLITUS WITH STAGE 3 CHRONIC KIDNEY DISEASE AND HYPERTENSION: ICD-10-CM

## 2020-10-28 DIAGNOSIS — I25.2 HISTORY OF MI (MYOCARDIAL INFARCTION): ICD-10-CM

## 2020-10-28 DIAGNOSIS — R53.81 PHYSICAL DECONDITIONING: ICD-10-CM

## 2020-10-28 DIAGNOSIS — I12.9 TYPE 2 DIABETES MELLITUS WITH STAGE 3 CHRONIC KIDNEY DISEASE AND HYPERTENSION: ICD-10-CM

## 2020-10-28 DIAGNOSIS — N18.30 TYPE 2 DIABETES MELLITUS WITH STAGE 3 CHRONIC KIDNEY DISEASE AND HYPERTENSION: Primary | ICD-10-CM

## 2020-10-28 DIAGNOSIS — R06.09 DOE (DYSPNEA ON EXERTION): ICD-10-CM

## 2020-10-28 DIAGNOSIS — I25.118 CORONARY ARTERY DISEASE OF NATIVE ARTERY OF NATIVE HEART WITH STABLE ANGINA PECTORIS: Primary | ICD-10-CM

## 2020-10-28 DIAGNOSIS — I12.9 TYPE 2 DIABETES MELLITUS WITH STAGE 3 CHRONIC KIDNEY DISEASE AND HYPERTENSION: Primary | ICD-10-CM

## 2020-10-28 DIAGNOSIS — Z95.5 S/P DRUG ELUTING CORONARY STENT PLACEMENT: ICD-10-CM

## 2020-10-28 DIAGNOSIS — I10 ESSENTIAL HYPERTENSION: ICD-10-CM

## 2020-10-28 DIAGNOSIS — Z95.1 S/P CABG (CORONARY ARTERY BYPASS GRAFT): ICD-10-CM

## 2020-10-28 DIAGNOSIS — E78.49 OTHER HYPERLIPIDEMIA: ICD-10-CM

## 2020-10-28 DIAGNOSIS — E66.01 MORBID OBESITY WITH BODY MASS INDEX (BMI) OF 40.0 OR HIGHER: ICD-10-CM

## 2020-10-28 DIAGNOSIS — N18.30 TYPE 2 DIABETES MELLITUS WITH STAGE 3 CHRONIC KIDNEY DISEASE AND HYPERTENSION: ICD-10-CM

## 2020-10-28 DIAGNOSIS — E78.5 DYSLIPIDEMIA, GOAL LDL BELOW 70: ICD-10-CM

## 2020-10-28 PROCEDURE — 99213 OFFICE O/P EST LOW 20 MIN: CPT | Mod: S$GLB,,, | Performed by: NURSE PRACTITIONER

## 2020-10-28 PROCEDURE — 1101F PR PT FALLS ASSESS DOC 0-1 FALLS W/OUT INJ PAST YR: ICD-10-PCS | Mod: CPTII,S$GLB,, | Performed by: NURSE PRACTITIONER

## 2020-10-28 PROCEDURE — 3075F SYST BP GE 130 - 139MM HG: CPT | Mod: CPTII,S$GLB,, | Performed by: NURSE PRACTITIONER

## 2020-10-28 PROCEDURE — 3051F PR MOST RECENT HEMOGLOBIN A1C LEVEL 7.0 - < 8.0%: ICD-10-PCS | Mod: CPTII,S$GLB,, | Performed by: NURSE PRACTITIONER

## 2020-10-28 PROCEDURE — 3078F PR MOST RECENT DIASTOLIC BLOOD PRESSURE < 80 MM HG: ICD-10-PCS | Mod: CPTII,S$GLB,, | Performed by: NURSE PRACTITIONER

## 2020-10-28 PROCEDURE — 3074F PR MOST RECENT SYSTOLIC BLOOD PRESSURE < 130 MM HG: ICD-10-PCS | Mod: CPTII,S$GLB,, | Performed by: NURSE PRACTITIONER

## 2020-10-28 PROCEDURE — 3008F PR BODY MASS INDEX (BMI) DOCUMENTED: ICD-10-PCS | Mod: CPTII,S$GLB,, | Performed by: NURSE PRACTITIONER

## 2020-10-28 PROCEDURE — 1126F PR PAIN SEVERITY QUANTIFIED, NO PAIN PRESENT: ICD-10-PCS | Mod: S$GLB,,, | Performed by: NURSE PRACTITIONER

## 2020-10-28 PROCEDURE — 99213 PR OFFICE/OUTPT VISIT, EST, LEVL III, 20-29 MIN: ICD-10-PCS | Mod: S$GLB,,, | Performed by: NURSE PRACTITIONER

## 2020-10-28 PROCEDURE — 1159F MED LIST DOCD IN RCRD: CPT | Mod: S$GLB,,, | Performed by: NURSE PRACTITIONER

## 2020-10-28 PROCEDURE — 3075F PR MOST RECENT SYSTOLIC BLOOD PRESS GE 130-139MM HG: ICD-10-PCS | Mod: CPTII,S$GLB,, | Performed by: NURSE PRACTITIONER

## 2020-10-28 PROCEDURE — 1159F PR MEDICATION LIST DOCUMENTED IN MEDICAL RECORD: ICD-10-PCS | Mod: S$GLB,,, | Performed by: NURSE PRACTITIONER

## 2020-10-28 PROCEDURE — 99999 PR PBB SHADOW E&M-EST. PATIENT-LVL V: ICD-10-PCS | Mod: PBBFAC,,, | Performed by: NURSE PRACTITIONER

## 2020-10-28 PROCEDURE — 99215 PR OFFICE/OUTPT VISIT, EST, LEVL V, 40-54 MIN: ICD-10-PCS | Mod: S$GLB,,, | Performed by: NURSE PRACTITIONER

## 2020-10-28 PROCEDURE — 99215 OFFICE O/P EST HI 40 MIN: CPT | Mod: S$GLB,,, | Performed by: NURSE PRACTITIONER

## 2020-10-28 PROCEDURE — 1101F PT FALLS ASSESS-DOCD LE1/YR: CPT | Mod: CPTII,S$GLB,, | Performed by: NURSE PRACTITIONER

## 2020-10-28 PROCEDURE — 3074F SYST BP LT 130 MM HG: CPT | Mod: CPTII,S$GLB,, | Performed by: NURSE PRACTITIONER

## 2020-10-28 PROCEDURE — 99499 RISK ADDL DX/OHS AUDIT: ICD-10-PCS | Mod: S$GLB,,, | Performed by: NURSE PRACTITIONER

## 2020-10-28 PROCEDURE — 3008F BODY MASS INDEX DOCD: CPT | Mod: CPTII,S$GLB,, | Performed by: NURSE PRACTITIONER

## 2020-10-28 PROCEDURE — 3051F HG A1C>EQUAL 7.0%<8.0%: CPT | Mod: CPTII,S$GLB,, | Performed by: NURSE PRACTITIONER

## 2020-10-28 PROCEDURE — 99999 PR PBB SHADOW E&M-EST. PATIENT-LVL V: CPT | Mod: PBBFAC,,, | Performed by: NURSE PRACTITIONER

## 2020-10-28 PROCEDURE — 99499 UNLISTED E&M SERVICE: CPT | Mod: S$GLB,,, | Performed by: NURSE PRACTITIONER

## 2020-10-28 PROCEDURE — 1126F AMNT PAIN NOTED NONE PRSNT: CPT | Mod: S$GLB,,, | Performed by: NURSE PRACTITIONER

## 2020-10-28 PROCEDURE — 99999 PR PBB SHADOW E&M-EST. PATIENT-LVL IV: CPT | Mod: PBBFAC,,, | Performed by: NURSE PRACTITIONER

## 2020-10-28 PROCEDURE — 99999 PR PBB SHADOW E&M-EST. PATIENT-LVL IV: ICD-10-PCS | Mod: PBBFAC,,, | Performed by: NURSE PRACTITIONER

## 2020-10-28 PROCEDURE — 3078F DIAST BP <80 MM HG: CPT | Mod: CPTII,S$GLB,, | Performed by: NURSE PRACTITIONER

## 2020-10-28 RX ORDER — PEN NEEDLE, DIABETIC 31 GX5/16"
NEEDLE, DISPOSABLE MISCELLANEOUS
Qty: 100 EACH | Refills: 3 | Status: SHIPPED | OUTPATIENT
Start: 2020-10-28 | End: 2021-04-12

## 2020-10-28 NOTE — PATIENT INSTRUCTIONS
Diabetes     Continue  Trulicity 1.5 mg weekly    DO NOT RESTART BASAGLAR    Decrease   Glipizide to 5 mg WITH FOOD     Message me for any blood sugar less than 70 and we will adjustments to your regimen. Message me for persistent blood sugars above 180.    Hypoglycemia - Low Blood Sugar    What can you do?  Rule of 15:    Test your blood sugar   If glucose is between 50-70 mg/dL then ingest 15 grams of fast-acting carbs   If glucose is less than 50 mg/dL then ingest 30 grams of fast-acting carbs   Ingest 15 grams of fast-acting carbohydrate - such as:   a. 3-4 glucose tablets  b. 4 oz juice  c. ½ can regular soda pop  d. 15 skittles or mini jelly beans    Re-check your blood sugar in 15 minutes. If its less than 70mg/dl, repeat steps 2 and 3.   If your next meal is more than 1 hour away, eat an additional 15 grams of carbohydrate and 1 ounce of protein (examples include crackers with cheese or one-half of a sandwich with peanut butter). It is important not to eat too much because this can raise your blood sugar above the target level.      After your blood sugar has normalized, think about why you went low. If you notice a pattern of low blood sugars, contact your Diabetes Team. We may need to adjust your medication.    Go buy ketone strips

## 2020-10-28 NOTE — ASSESSMENT & PLAN NOTE
-- Continue fosamax weekly  -- Reviewed basics of quantity versus quality  -- Reassuring she is not fracturing   -- RDA of calcium (4204-5498 mg /day in divided doses) and vitamin D 2000iu a day  discussed  -- Calcium from food sources preferred  -- Fall precautions emphasized  -- +weight bearing exercise  -- Repeat DEXA scan in 2021

## 2020-10-28 NOTE — PROGRESS NOTES
HPI     Pt here for problem focused visit   Pt sleeps with a cpap machine   2 days ago she woke up with the cpap strap over OS   Had a lot of pressure for the rest of the day  Pain eventually went away and then she woke up this morning with pain and   swollen OS   Pt does not use any eye drops.  No blurry va or sensitivity to light      Last edited by Rachel Mann on 10/26/2020  2:28 PM. (History)            Assessment /Plan     For exam results, see Encounter Report.    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

## 2020-10-28 NOTE — ASSESSMENT & PLAN NOTE
-- Labs prior  -- A1c 7-7.5% without hypoglycemia.   -- Check fructosamine level  -- Medication Changes:   Continue  Trulicity 1.5 mg weekly  DO NOT RESTART BASAGLAR  Decrease   Glipizide to 5 mg WITH FOOD   Message me for any blood sugar less than 70 and we will adjustments to your regimen. Message me for persistent blood sugars above 180.  -- We will prescribe novolog for steroid injections. Discussed she will take the novolog before meals with sliding scale below.  With using correction scale:  MDD of:   150-200: +2 units  201-250: +4 units  251-300: +6 units  301-350: +8 units  >350: +10 units    -- Has GVOKE pen.   -- She is fearful of DKA Discussed signs and symptoms of DKA and instructed to buy ketone strips  -- Reviewed goals of therapy are to get the best control we can without hypoglycemia  -- Insulin injection sites and proper rotation instructed.    -- Advised frequent self blood glucose monitoring.  Patient encouraged to document glucose results and bring them to every clinic visit.  -- Hypoglycemia precautions discussed. Instructed on precautions before driving.    -- Call for Bg repeatedly < 90 or > 180.   -- Close adherence to lifestyle changes recommended.   -- Periodic follow ups for eye evaluations, foot care and dental care suggested. [facility]
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
## 2020-10-28 NOTE — PROGRESS NOTES
Patient ID: Kaylee Romero is a 75 y.o. female    Chief Complaint:   Chief Complaint   Patient presents with    Diabetes     HPI: Kaylee Romero with type 2 diabetes complicated by CAD s/p CABG, TIAs, hypertension, dyslipidemia, CKD Stage 3, obesity, osteoporosis, and KAMRAN presents for follow up of Type 2 diabetes and osteoporosis. Previous patient of MAURICIO Pritchard NP and myself. Last visit in Sept 2020.      Was seen in the hospital by KAVON Pritchard NP in 9/2019 because of a Surgical Procedure and Date: CABG 8/12/19  He started her on levemir daily.     At her last visit in Sept 2020 she had a steroid injection and gave extra long acting insulin.     Interim visit but lower blood sugars.     Today, she reports increased anxiety with chest pain and high heart rate when she is startled. She was started on buspar and taking nitro. She will see cardio today. Reports her blood sugars have been so good, she took herself off the basaglar 3 days ago. She stopped sugar a couple of weeks ago.     With regards to the diabetes:     Diagnosed with Type 2 diabetes: 2013  Family history of Type 2 diabetes: father  Known complications:  DKA-  RN-  PN-  Nephropathy-     Current regimen:  Trulicity 1.5 mg weekly  Glipizide 10 mg when she wakes up (not with food)  Basaglar 15u HS-stopped with lower blood   NOVOLOG PRN steroid injection     Other medications tried:  Jardiance-insurance issues     Glucose Monitor:  1-3 times a day testing  Log reviewed: yes, digital medicine program  Hypoglycemia: she has been having increased hypoglycemic episodes. She reports BGs 40-60's. She stopped her Basaglar 3 days ago but has not had a low blood sugar <78 since.   Denies symptoms of hypoglycemia-hypoglycemia unawareness   Knows how to correct with 15 grams of carbs- juice, coke, or a peppermint.          Diet/Exercise: She feels that she is trying to follow diabetic diet. She is   Eats 1-2 meals a day.  She eats nuts in the afternoon   Main  meal is vegetable and meat sometimes rice   Snacks: candy   Drinks: water  Exercise - tries to stay active. No formal exercise. Has right knee pain.      Education - last visit: none; politely declines  Has GVOKE    Denies history of pancreatitis & personal/family history of medullary thyroid cancer.    Lab Results   Component Value Date    HGBA1C 7.2 (H) 09/21/2020    HGBA1C 6.5 (H) 05/15/2020    HGBA1C 6.7 (H) 12/11/2019     Screening or Prevention Patient's value Goal Complete/Controlled?   HgA1C Testing and Control   Lab Results   Component Value Date    HGBA1C 7.2 (H) 09/21/2020      Annually/Less than 8% Yes   Lipid profile : 05/15/2020 Annually Yes   LDL control Lab Results   Component Value Date    LDLCALC 40.8 (L) 05/15/2020    Annually/Less than 100 mg/dl  Yes   Nephropathy screening Lab Results   Component Value Date    LABMICR 188.0 10/24/2017     Lab Results   Component Value Date    PROTEINUA 1+ (A) 08/28/2019    Annually No   Blood pressure BP Readings from Last 1 Encounters:   10/28/20 130/74    Less than 140/90 Yes   Dilated retinal exam : 06/27/2019 Annually Yes   Foot exam   : 10/05/2020 Annually Yes     With regards to osteoporosis:   Current meds: Fosamax  Started: 6/2019     Calcium intake?  1000 mg a day   Vit D intake?  2000 iu a day   Weight bearing exercise?   Walking   Recent falls- August 2018; September 2018 - no fractures and no falls from a standing position.       They have made fall precautions in house      No recent fractures   No significant height loss (>2 inches)     tob use -  None  etoh use- None     Family history: none     No current diarrhea or h/o malabsorption     Menopause at age 56     Denies chronic exposure to anticoagulants, proton pump inhibitors or antiseizure medications.   +Steroid injections knees     Denies GERD, indigestion, or gastric bypass surgery.      Denies history of thyroid disease, anemia, kidney stones or kidney disease.      Denies active  malignancy, history of malignancy the involved the bone, prior radiation treatment, or unexplained elevations of alk phos on labs      BMD 3/2017  Osteoporosis of the left femoral neck.  Normal bone mineral density of the lumbar spine.  Ten year probability of major osteoporotic fracture is 9.5%, and hip fracture is 2.1%.  BMD 5/23/19       COMPARISON:  Comparison study done on 03/15/2013. Lumbar spine BMD 1.138 g/cm2 and the T-score 0.8.  The Total Hip BMD 0.928 g/cm2 and the T-score -0.1.    FINDINGS:  Lumbar Spine: Lumbar bone mineral density L1-L4 is 1.069g/cm2, which is a T-score of 0.2. The Z-score is 1.7.    Total Hip: Total hip bone mineral density is 0.808g/cm2.  The T-score is -1.1, and the Z-score is -0.2.    Femoral neck: Bone mineral density is 0.576g/cm2 and the T-score is -2.5 and the Z-score is -1.1 g/cm2.    There is a 6.7% risk of a major osteoporotic fracture and a 1.6% risk of hip fracture in the next 10 years (FRAX).    Compared with previous DXA, BMD at the lumbar spine has decreased by -6%, and the BMD at the total hip has decreased by -12.9%.       Impression       Osteoporosis of the femoral neck.  RECOMMENDATIONS of [facility]:  1.  Calcium 0554-1787 mg daily and vitamin D 800-1000 units daily, adequate exercise.  2.  Consider bisphosphonates (alendronate, risedronate, ibandronate, zolendronic acid) or denosumab.  3.  Repeat BMD in 2 years      With regards to the vitamin d deficiency:  currently taking otc 2000 iu a day    Vit D, 25-Hydroxy   Date Value Ref Range Status   05/15/2020 39 30 - 96 ng/mL Final     Comment:     Vitamin D deficiency.........<10 ng/mL                              Vitamin D insufficiency......10-29 ng/mL       Vitamin D sufficiency........> or equal to 30 ng/mL  Vitamin D toxicity............>100 ng/mL       No recent fractures       Current Outpatient Medications:     alendronate (FOSAMAX) 70 MG tablet, Take 1 tablet (70 mg  total) by mouth every 7 days. Take with a full glass of water & remain upright for at least 30 minutes, Disp: 12 tablet, Rfl: 3    amLODIPine (NORVASC) 10 MG tablet, Take 1 tablet (10 mg total) by mouth once daily., Disp: 90 tablet, Rfl: 2    aspirin 81 MG Chew, Take 1 tablet (81 mg total) by mouth once daily., Disp: , Rfl: 0    atorvastatin (LIPITOR) 80 MG tablet, Take 1 tablet (80 mg total) by mouth once daily., Disp: 90 tablet, Rfl: 2    blood sugar diagnostic Strp, 1 strip by Misc.(Non-Drug; Combo Route) route once daily., Disp: 100 strip, Rfl: 3    busPIRone (BUSPAR) 5 MG Tab, Take 1 tablet (5 mg total) by mouth 2 (two) times daily. For anxiety., Disp: 60 tablet, Rfl: 2    carvediloL (COREG) 25 MG tablet, Take 1 tablet (25 mg total) by mouth 2 (two) times daily with meals., Disp: 60 tablet, Rfl: 11    chlorthalidone (HYGROTEN) 25 MG Tab, , Disp: , Rfl:     clopidogreL (PLAVIX) 75 mg tablet, Take 1 tablet (75 mg total) by mouth once daily., Disp: 90 tablet, Rfl: 1    COD LIVER OIL ORAL, Take 3 capsules by mouth once daily., Disp: , Rfl:     diphenoxylate-atropine 2.5-0.025 mg (LOMOTIL) 2.5-0.025 mg per tablet, Take 1 tablet by mouth 4 (four) times daily as needed for Diarrhea., Disp: 20 tablet, Rfl: 0    donepeziL (ARICEPT) 5 MG tablet, Take 1 tablet (5 mg total) by mouth every evening. For memory., Disp: 30 tablet, Rfl: 2    dulaglutide (TRULICITY) 1.5 mg/0.5 mL pen injector, Inject 1.5 mg into the skin every 7 days., Disp: 2 mL, Rfl: 5    ferrous sulfate (FEOSOL) 325 mg (65 mg iron) Tab tablet, TAKE 1 TABLET BY MOUTH EVERY DAY, Disp: 90 tablet, Rfl: 0    glipiZIDE (GLUCOTROL) 10 MG tablet, TAKE 1 TABLET(10 MG) BY MOUTH DAILY WITH BREAKFAST, Disp: 90 tablet, Rfl: 1    glucagon (GVOKE HYPOPEN 2-PACK) 1 mg/0.2 mL AtIn, Inject 1 Package into the skin as needed., Disp: 2 Syringe, Rfl: 0    insulin (BASAGLAR KWIKPEN U-100 INSULIN) glargine 100 units/mL (3mL) SubQ pen, Inject 15 Units into the skin  "once daily., Disp: 3 Box, Rfl: 11    irbesartan (AVAPRO) 300 MG tablet, Take 1 tablet (300 mg total) by mouth every evening., Disp: 90 tablet, Rfl: 1    lancets Misc, 1 lancet by Misc.(Non-Drug; Combo Route) route once daily., Disp: 100 each, Rfl: 3    multivitamin (ONE DAILY MULTIVITAMIN) per tablet, Take 1 tablet by mouth once daily., Disp: , Rfl:     nitroGLYCERIN (NITROSTAT) 0.4 MG SL tablet, Place 1 tablet (0.4 mg total) under the tongue every 5 (five) minutes as needed for Chest pain., Disp: 25 tablet, Rfl: 6    vitamin D (VITAMIN D3) 1000 units Tab, Take 1,000 Units by mouth once daily., Disp: , Rfl:     BD ULTRA-FINE BOO PEN NEEDLE 32 gauge x 5/32" Ndle, To use once daily, Disp: 100 each, Rfl: 3    Results for GERARDO HOLM (MRN [id number]) as of 9/28/2020 15:59   Ref. Range 9/21/2020 14:12   Sodium Latest Ref Range: 136 - 145 mmol/L 136   Potassium Latest Ref Range: 3.5 - 5.1 mmol/L 4.0   Chloride Latest Ref Range: 95 - 110 mmol/L 101   CO2 Latest Ref Range: 23 - 29 mmol/L 25   Anion Gap Latest Ref Range: 8 - 16 mmol/L 10   BUN, Bld Latest Ref Range: 8 - 23 mg/dL 19   Creatinine Latest Ref Range: 0.5 - 1.4 mg/dL 1.0   eGFR if non African American Latest Ref Range: >60 mL/min/1.73 m^2 56.0 (A)   eGFR if African American Latest Ref Range: >60 mL/min/1.73 m^2 >60.0   Glucose Latest Ref Range: 70 - 110 mg/dL 131 (H)   Calcium Latest Ref Range: 8.7 - 10.5 mg/dL 9.7   Alkaline Phosphatase Latest Ref Range: 55 - 135 U/L 100   PROTEIN TOTAL Latest Ref Range: 6.0 - 8.4 g/dL 8.5 (H)   Albumin Latest Ref Range: 3.5 - 5.2 g/dL 3.4 (L)   BILIRUBIN TOTAL Latest Ref Range: 0.1 - 1.0 mg/dL 0.5   AST Latest Ref Range: 10 - 40 U/L 22   ALT Latest Ref Range: 10 - 44 U/L 26   Hemoglobin A1C External Latest Ref Range: 4.0 - 5.6 % 7.2 (H)   Estimated Avg Glucose Latest Ref Range: 68 - 131 mg/dL 160 (H)     Assessment and plan:   1. Type 2 diabetes mellitus with stage 3 chronic kidney disease and hypertension  BD " "ULTRA-FINE BOO PEN NEEDLE 32 gauge x 5/32" Ndle    Fructosamine   2. Age-related osteoporosis without current pathological fracture     3. Vitamin D deficiency     4. Other hyperlipidemia     5. Essential hypertension         Type 2 diabetes mellitus with stage 3 chronic kidney disease and hypertension  -- Labs prior  -- A1c 7-7.5% without hypoglycemia.   -- Check fructosamine level  -- Medication Changes:   Continue  Trulicity 1.5 mg weekly  DO NOT RESTART BASAGLAR  Decrease   Glipizide to 5 mg WITH FOOD   Message me for any blood sugar less than 70 and we will adjustments to your regimen. Message me for persistent blood sugars above 180.  -- We will prescribe novolog for steroid injections. Discussed she will take the novolog before meals with sliding scale below.  With using correction scale:  MDD of:   150-200: +2 units  201-250: +4 units  251-300: +6 units  301-350: +8 units  >350: +10 units    -- Has GVOKE pen.   -- She is fearful of DKA Discussed signs and symptoms of DKA and instructed to buy ketone strips  -- Reviewed goals of therapy are to get the best control we can without hypoglycemia  -- Insulin injection sites and proper rotation instructed.    -- Advised frequent self blood glucose monitoring.  Patient encouraged to document glucose results and bring them to every clinic visit.  -- Hypoglycemia precautions discussed. Instructed on precautions before driving.    -- Call for Bg repeatedly < 90 or > 180.   -- Close adherence to lifestyle changes recommended.   -- Periodic follow ups for eye evaluations, foot care and dental care suggested. UTD    Age-related osteoporosis without current pathological fracture  -- Continue fosamax weekly  -- Reviewed basics of quantity versus quality  -- Reassuring she is not fracturing   -- RDA of calcium (9323-5752 mg /day in divided doses) and vitamin D 2000iu a day  discussed  -- Calcium from food sources preferred  -- Fall precautions emphasized  -- +weight bearing " exercise  -- Repeat DEXA scan in 2021    Vitamin D deficiency  Continue vit d daily    Hyperlipidemia  -- Controlled   -- On statin per ADA recommendations    Essential hypertension  -- on ARB  -- Controlled  -- Blood pressure goals discussed with patient      No follow-ups on file.

## 2020-10-29 PROBLEM — E11.59 OBESITY, DIABETES, AND HYPERTENSION SYNDROME: Status: ACTIVE | Noted: 2020-10-29

## 2020-10-29 PROBLEM — E11.69 OBESITY, DIABETES, AND HYPERTENSION SYNDROME: Status: ACTIVE | Noted: 2020-10-29

## 2020-10-29 PROBLEM — N18.31 STAGE 3A CHRONIC KIDNEY DISEASE: Status: ACTIVE | Noted: 2017-04-20

## 2020-10-29 PROBLEM — R06.09 DOE (DYSPNEA ON EXERTION): Status: ACTIVE | Noted: 2020-06-15

## 2020-10-29 PROBLEM — I15.2 OBESITY, DIABETES, AND HYPERTENSION SYNDROME: Status: ACTIVE | Noted: 2020-10-29

## 2020-10-29 PROBLEM — E66.9 OBESITY, DIABETES, AND HYPERTENSION SYNDROME: Status: ACTIVE | Noted: 2020-10-29

## 2020-10-29 NOTE — PROGRESS NOTES
"Patient saw Endo yesterday.  Low BG readings were addressed. Per Endo Note:    "Log reviewed: yes, digital medicine program  Hypoglycemia: she has been having increased hypoglycemic episodes. She reports BGs 40-60's. She stopped her Basaglar 3 days ago but has not had a low blood sugar <78 since.   Denies symptoms of hypoglycemia-hypoglycemia unawareness   Knows how to correct with 15 grams of carbs- juice, coke, or a peppermint. "    Will follow up in 2 weeks.  "

## 2020-10-30 DIAGNOSIS — M81.0 AGE-RELATED OSTEOPOROSIS WITHOUT CURRENT PATHOLOGICAL FRACTURE: Primary | ICD-10-CM

## 2020-10-30 RX ORDER — GLIPIZIDE 5 MG/1
5 TABLET ORAL
Qty: 30 TABLET | Refills: 11 | Status: SHIPPED | OUTPATIENT
Start: 2020-10-30 | End: 2021-02-08 | Stop reason: SDUPTHER

## 2020-10-31 ENCOUNTER — PATIENT MESSAGE (OUTPATIENT)
Dept: ENDOCRINOLOGY | Facility: CLINIC | Age: 75
End: 2020-10-31

## 2020-10-31 ENCOUNTER — PATIENT MESSAGE (OUTPATIENT)
Dept: ORTHOPEDICS | Facility: CLINIC | Age: 75
End: 2020-10-31

## 2020-11-02 ENCOUNTER — PATIENT MESSAGE (OUTPATIENT)
Dept: ENDOCRINOLOGY | Facility: CLINIC | Age: 75
End: 2020-11-02

## 2020-11-03 ENCOUNTER — PATIENT MESSAGE (OUTPATIENT)
Dept: ORTHOPEDICS | Facility: CLINIC | Age: 75
End: 2020-11-03

## 2020-11-06 ENCOUNTER — TELEPHONE (OUTPATIENT)
Dept: CARDIAC REHAB | Facility: CLINIC | Age: 75
End: 2020-11-06

## 2020-11-06 ENCOUNTER — DOCUMENTATION ONLY (OUTPATIENT)
Dept: CARDIAC REHAB | Facility: CLINIC | Age: 75
End: 2020-11-06

## 2020-11-06 NOTE — PROGRESS NOTES
I spoke with Miss Page today regarding an order to Phase II cardiac rehab.    She had been referred to rehab last October.  When she arrived for the orientation it was noted she had balance issues.  I message her doctor who then proceeded to order physical therapy to help strengthen her so that she would be able to attend Phase II cardiac rehab.  October of this year she was referred again to Phase II cardiac rehab.  She contacted me because she has some concerns about COVID and her chronic knee pain.  I suggested she contact her orthopedic and about what should be the plan of action from here.  She stated she would contact me to let me know what was decided.

## 2020-11-09 ENCOUNTER — TELEPHONE (OUTPATIENT)
Dept: ORTHOPEDICS | Facility: CLINIC | Age: 75
End: 2020-11-09

## 2020-11-09 NOTE — TELEPHONE ENCOUNTER
----- Message from Jaz Castle MA sent at 11/9/2020  9:08 AM CST -----  Contact: [phone]  Pt is wanting  to talk to Maryjane about setting up a surgery consultation with Dr [facility] for her knee

## 2020-11-10 ENCOUNTER — TELEPHONE (OUTPATIENT)
Dept: CARDIOLOGY | Facility: CLINIC | Age: 75
End: 2020-11-10

## 2020-11-11 DIAGNOSIS — D64.9 NORMOCYTIC ANEMIA: ICD-10-CM

## 2020-11-11 RX ORDER — FERROUS SULFATE 325(65) MG
1 TABLET ORAL DAILY
Qty: 90 TABLET | Refills: 0 | Status: SHIPPED | OUTPATIENT
Start: 2020-11-11 | End: 2021-02-08 | Stop reason: SDUPTHER

## 2020-11-11 NOTE — PROGRESS NOTES
"Digital Medicine: Health  Follow-Up    The history is provided by the patient.               Care Team received low BG alert.          Topics Covered on Call: Diet    Additional Follow-up details: Following up about low BG readings on 10/24 of 42, 69, and 3mg/dL and 11/9 reading of 54 mg/dL.  Patient states on 10/24 she did not eat much and had low BG readings.  States she took insulin right after that, but believes 3mg/dL was an error.  States her machine just read "low" and did not show a number.  Will remove reading of 3mg/dL from chart.  States she saw Endo on 10/28 and adjusted her medication.    Reports on 11/9 she did not eat anything that day, but ate after she tested her BG and it was 54mg/dL.  Denies hypoglycemic symptoms.            Diet-Change      Dietary Improvements:States she has been working on cutting out all sweets from her diet. States she is not eating candy or cookies.            Intervention(s): carb reduction and regularly scheduling meals      Physical Activity-Not assessed    Medication Adherence-Medication adherence was asssessed.  Patient continue taking medication as prescribed.          Patient states she is not taking insulin. Reports she was taking 5mg of glipizide, but saw her BG numbers start to go up so now she is back to 10mg of glipizide. Will route note to clinician.  Substance, Sleep, Stress-Not assessed      Continue current diet/physical activity routine.  Provided patient education. Reviewed goal ranges for BG before meals and after meals.       Addressed patient questions and patient has my contact information if needed prior to next outreach. Patient verbalizes understanding.      Explained the importance of self-monitoring and medication adherence. Encouraged the patient to communicate with their health  for lifestyle modifications to help improve or maintain a healthy lifestyle.               There are no preventive care reminders to display for this " patient.      Last 5 Patient Entered Readings                                      Current 30 Day Average: 105/67     Recent Readings 11/4/2020 10/29/2020 10/27/2020 10/19/2020 10/18/2020    SBP (mmHg) 127 103 94 107 103    DBP (mmHg) 76 65 57 70 64    Pulse 73 87 85 79 88        Last 6 Patient Entered Readings                                          Most Recent A1c: 7.2% on 9/21/2020  (Goal: 8%)     Recent Readings 11/11/2020 11/10/2020 11/9/2020 11/8/2020 11/6/2020    Blood Glucose (mg/dL) 97 85 54 133 164

## 2020-11-13 ENCOUNTER — PATIENT OUTREACH (OUTPATIENT)
Dept: OTHER | Facility: OTHER | Age: 75
End: 2020-11-13

## 2020-11-16 NOTE — PROGRESS NOTES
Digital Medicine: Clinician Follow-Up    Called Kaylee Romero for Digital Medicine Hypertension and Diabetes routine follow-up: 7.2%, 110/69.    Patient reports changing her diet where she has eliminated sugars from diet that resulted in causing low blood sugars because no change was made to her diabetes medications. She has since stopped taking her nighttime insulin Basaglar and low blood sugar issues have resolved. She is taking glipizide 10 mg total daily.     She expresses concerns with her glucometer flagging blood sugar readings as out of range, such as before lunch readings 118 and 137. After discussing further, it was determined that these readings are truly her first readings of the day so before breakfast or fasting would be a more appropriate label. Patient does know how to change meal-relationship in glucometer and plans to do so going forward. Also educated patient that blood sugar goal is 80 to 180 for the program.    The history is provided by the patient.   Follow-up reason(s): routine follow up.     Hypertension    Readings are trending up   Diabetes    Patient's blood glucose is stable.         Last 5 Patient Entered Readings                                      Current 30 Day Average: 110/69     Recent Readings 11/15/2020 11/4/2020 10/29/2020 10/27/2020 10/19/2020    SBP (mmHg) 125 127 103 94 107    DBP (mmHg) 79 76 65 57 70    Pulse 84 73 87 85 79        Last 6 Patient Entered Readings                                          Most Recent A1c: 7.2% on 9/21/2020  (Goal: 8%)     Recent Readings 11/15/2020 11/12/2020 11/11/2020 11/10/2020 11/9/2020    Blood Glucose (mg/dL) 91 113 97 85 54               Depression Screening  Did not address depression screening.    Sleep Apnea Screening    Did not address sleep apnea screening.     Medication Affordability Screening  Did not address medication affordability screening.     Medication Adherence-Medication adherence was assessed.        Stopped  Basaglar.  Taking Glipizide 10 mg daily.   Taking all other T2DM medications as prescribed.       ASSESSMENT(S)  Patients BP average is 110/69 mmHg, which is at goal. Patient's BP goal is less than or equal to 130/80.  Patient's A1C goal is less than or equal to 8. Patient's most recent A1C result is at goal. Lab Results    Component                Value               Date                     HGBA1C                   7.2 (H)             09/21/2020          .       Home BP readings are meeting goal 100% of the time. No medication changes needed at this time. Continue to monitor.    SMBG readings are meeting goal 64% of the time. Stopping insulin Basaglar appears to have resolved hypoglycemia. Continue to monitor.    F/u in 6-8 weeks.       Hypertension Plan  Continue current therapy.  Continue current diet/physical activity routine.    Diabetes Plan  Continue current therapy.  Continue current diet/physical activity routine.       Addressed patient questions and patient has my contact information if needed prior to next outreach. Patient verbalizes understanding.             There are no preventive care reminders to display for this patient.  There are no preventive care reminders to display for this patient.      Hypertension Medications             amLODIPine (NORVASC) 10 MG tablet Take 1 tablet (10 mg total) by mouth once daily.    carvediloL (COREG) 25 MG tablet Take 1 tablet (25 mg total) by mouth 2 (two) times daily with meals.    chlorthalidone (HYGROTEN) 25 MG Tab     irbesartan (AVAPRO) 300 MG tablet Take 1 tablet (300 mg total) by mouth every evening.    nitroGLYCERIN (NITROSTAT) 0.4 MG SL tablet Place 1 tablet (0.4 mg total) under the tongue every 5 (five) minutes as needed for Chest pain.        Diabetes Medications             dulaglutide (TRULICITY) 1.5 mg/0.5 mL pen injector Inject 1.5 mg into the skin every 7 days.    glipiZIDE (GLUCOTROL) 5 MG tablet Take 1 tablet (5 mg total) by mouth daily with  dinner or evening meal.    glucagon (GVOKE HYPOPEN 2-PACK) 1 mg/0.2 mL AtIn Inject 1 Package into the skin as needed.    insulin (BASAGLAR KWIKPEN U-100 INSULIN) glargine 100 units/mL (3mL) SubQ pen Inject 15 Units into the skin once daily.  NOT TAKING DUE TO DIET CHANGES.

## 2020-12-01 ENCOUNTER — OFFICE VISIT (OUTPATIENT)
Dept: OPTOMETRY | Facility: CLINIC | Age: 75
End: 2020-12-01
Payer: MEDICARE

## 2020-12-01 DIAGNOSIS — G47.33 OSA ON CPAP: ICD-10-CM

## 2020-12-01 DIAGNOSIS — H04.123 DRY EYE SYNDROME, BILATERAL: ICD-10-CM

## 2020-12-01 DIAGNOSIS — H52.4 PRESBYOPIA: ICD-10-CM

## 2020-12-01 DIAGNOSIS — H25.13 NS (NUCLEAR SCLEROSIS), BILATERAL: Primary | ICD-10-CM

## 2020-12-01 PROCEDURE — 92012 PR EYE EXAM, EST PATIENT,INTERMED: ICD-10-PCS | Mod: S$GLB,,, | Performed by: OPTOMETRIST

## 2020-12-01 PROCEDURE — 99999 PR PBB SHADOW E&M-EST. PATIENT-LVL II: ICD-10-PCS | Mod: PBBFAC,,, | Performed by: OPTOMETRIST

## 2020-12-01 PROCEDURE — 92012 INTRM OPH EXAM EST PATIENT: CPT | Mod: S$GLB,,, | Performed by: OPTOMETRIST

## 2020-12-01 PROCEDURE — 99999 PR PBB SHADOW E&M-EST. PATIENT-LVL II: CPT | Mod: PBBFAC,,, | Performed by: OPTOMETRIST

## 2020-12-01 NOTE — PROGRESS NOTES
HPI     Pt here for dry eye f/u and mrx  Pt seeing ok out of current specs  Feels like OU feeling much better x last visit   Using eye gtts prn   No other ocular complaints    Last edited by Rachel Mann on 12/1/2020  2:29 PM. (History)            Assessment /Plan     For exam results, see Encounter Report.    NS (nuclear sclerosis), bilateral   Not visually significant at this time    KAMRAN on CPAP  Dry eye syndrome, bilateral   Irriation from cpap OS resolved   Use art tears PRN    Presbyopia   Rx specs   Good BCVA with specs    RTC 1 year, sooner PRN

## 2020-12-16 ENCOUNTER — TELEPHONE (OUTPATIENT)
Dept: PHARMACY | Facility: CLINIC | Age: 75
End: 2020-12-16

## 2020-12-28 ENCOUNTER — LAB VISIT (OUTPATIENT)
Dept: LAB | Facility: HOSPITAL | Age: 75
End: 2020-12-28
Attending: NURSE PRACTITIONER
Payer: MEDICARE

## 2020-12-28 DIAGNOSIS — N18.30 TYPE 2 DIABETES MELLITUS WITH STAGE 3 CHRONIC KIDNEY DISEASE AND HYPERTENSION: ICD-10-CM

## 2020-12-28 DIAGNOSIS — I12.9 TYPE 2 DIABETES MELLITUS WITH STAGE 3 CHRONIC KIDNEY DISEASE AND HYPERTENSION: ICD-10-CM

## 2020-12-28 DIAGNOSIS — E11.22 TYPE 2 DIABETES MELLITUS WITH STAGE 3 CHRONIC KIDNEY DISEASE AND HYPERTENSION: ICD-10-CM

## 2020-12-28 LAB
ALBUMIN SERPL BCP-MCNC: 3.6 G/DL (ref 3.5–5.2)
ALP SERPL-CCNC: 90 U/L (ref 55–135)
ALT SERPL W/O P-5'-P-CCNC: 24 U/L (ref 10–44)
ANION GAP SERPL CALC-SCNC: 9 MMOL/L (ref 8–16)
AST SERPL-CCNC: 23 U/L (ref 10–40)
BILIRUB SERPL-MCNC: 0.4 MG/DL (ref 0.1–1)
BUN SERPL-MCNC: 20 MG/DL (ref 8–23)
CALCIUM SERPL-MCNC: 9.4 MG/DL (ref 8.7–10.5)
CHLORIDE SERPL-SCNC: 103 MMOL/L (ref 95–110)
CO2 SERPL-SCNC: 25 MMOL/L (ref 23–29)
CREAT SERPL-MCNC: 1.1 MG/DL (ref 0.5–1.4)
EST. GFR  (AFRICAN AMERICAN): 57.6 ML/MIN/1.73 M^2
EST. GFR  (NON AFRICAN AMERICAN): 49.9 ML/MIN/1.73 M^2
ESTIMATED AVG GLUCOSE: 186 MG/DL (ref 68–131)
GLUCOSE SERPL-MCNC: 138 MG/DL (ref 70–110)
HBA1C MFR BLD HPLC: 8.1 % (ref 4–5.6)
POTASSIUM SERPL-SCNC: 3.9 MMOL/L (ref 3.5–5.1)
PROT SERPL-MCNC: 8.1 G/DL (ref 6–8.4)
SODIUM SERPL-SCNC: 137 MMOL/L (ref 136–145)

## 2020-12-28 PROCEDURE — 83036 HEMOGLOBIN GLYCOSYLATED A1C: CPT

## 2020-12-28 PROCEDURE — 80053 COMPREHEN METABOLIC PANEL: CPT

## 2020-12-28 PROCEDURE — 36415 COLL VENOUS BLD VENIPUNCTURE: CPT | Mod: PN

## 2020-12-30 NOTE — TELEPHONE ENCOUNTER
----- Message from Bonnie Avelar sent at 11/1/2019 10:04 AM CDT -----  Contact: pt @ [phone]  Calling to speak with someone in Dr. Ventura' office regarding her getting an appt. Please call.  
Attempts to contact pt in regards to scheduling appt  were unsuccessful, left VM for pt to call back at her earliest convenience.        ----- Message from Bonnie Avelar sent at 11/1/2019 10:04 AM CDT -----  Contact: pt @ [phone]  Calling to speak with someone in Dr. Ventura' office regarding her getting an appt. Please call.    
No

## 2020-12-31 ENCOUNTER — TELEPHONE (OUTPATIENT)
Dept: ENDOCRINOLOGY | Facility: CLINIC | Age: 75
End: 2020-12-31

## 2021-01-04 ENCOUNTER — TELEPHONE (OUTPATIENT)
Dept: ENDOCRINOLOGY | Facility: CLINIC | Age: 76
End: 2021-01-04

## 2021-01-04 ENCOUNTER — OFFICE VISIT (OUTPATIENT)
Dept: ENDOCRINOLOGY | Facility: CLINIC | Age: 76
End: 2021-01-04
Payer: MEDICARE

## 2021-01-04 DIAGNOSIS — E55.9 VITAMIN D DEFICIENCY: Chronic | ICD-10-CM

## 2021-01-04 DIAGNOSIS — I10 ESSENTIAL HYPERTENSION: Chronic | ICD-10-CM

## 2021-01-04 DIAGNOSIS — M81.0 OSTEOPOROSIS WITHOUT CURRENT PATHOLOGICAL FRACTURE, UNSPECIFIED OSTEOPOROSIS TYPE: Chronic | ICD-10-CM

## 2021-01-04 DIAGNOSIS — E11.22 TYPE 2 DIABETES MELLITUS WITH STAGE 3 CHRONIC KIDNEY DISEASE AND HYPERTENSION: Primary | Chronic | ICD-10-CM

## 2021-01-04 DIAGNOSIS — I12.9 TYPE 2 DIABETES MELLITUS WITH STAGE 3 CHRONIC KIDNEY DISEASE AND HYPERTENSION: Primary | Chronic | ICD-10-CM

## 2021-01-04 DIAGNOSIS — N18.30 TYPE 2 DIABETES MELLITUS WITH STAGE 3 CHRONIC KIDNEY DISEASE AND HYPERTENSION: Primary | Chronic | ICD-10-CM

## 2021-01-04 PROCEDURE — 3052F PR MOST RECENT HEMOGLOBIN A1C LEVEL 8.0 - < 9.0%: ICD-10-PCS | Mod: CPTII,95,, | Performed by: NURSE PRACTITIONER

## 2021-01-04 PROCEDURE — 3072F LOW RISK FOR RETINOPATHY: CPT | Mod: 95,,, | Performed by: NURSE PRACTITIONER

## 2021-01-04 PROCEDURE — 1159F MED LIST DOCD IN RCRD: CPT | Mod: 95,,, | Performed by: NURSE PRACTITIONER

## 2021-01-04 PROCEDURE — 99214 PR OFFICE/OUTPT VISIT, EST, LEVL IV, 30-39 MIN: ICD-10-PCS | Mod: 95,,, | Performed by: NURSE PRACTITIONER

## 2021-01-04 PROCEDURE — 99214 OFFICE O/P EST MOD 30 MIN: CPT | Mod: 95,,, | Performed by: NURSE PRACTITIONER

## 2021-01-04 PROCEDURE — 1159F PR MEDICATION LIST DOCUMENTED IN MEDICAL RECORD: ICD-10-PCS | Mod: 95,,, | Performed by: NURSE PRACTITIONER

## 2021-01-04 PROCEDURE — 3052F HG A1C>EQUAL 8.0%<EQUAL 9.0%: CPT | Mod: CPTII,95,, | Performed by: NURSE PRACTITIONER

## 2021-01-04 PROCEDURE — 3072F PR LOW RISK FOR RETINOPATHY: ICD-10-PCS | Mod: 95,,, | Performed by: NURSE PRACTITIONER

## 2021-01-04 RX ORDER — DULAGLUTIDE 3 MG/.5ML
3 INJECTION, SOLUTION SUBCUTANEOUS WEEKLY
Qty: 2 ML | Refills: 3 | Status: SHIPPED | OUTPATIENT
Start: 2021-01-04 | End: 2021-01-04 | Stop reason: SDUPTHER

## 2021-01-04 RX ORDER — DULAGLUTIDE 3 MG/.5ML
3 INJECTION, SOLUTION SUBCUTANEOUS WEEKLY
Qty: 2 ML | Refills: 0 | Status: SHIPPED | OUTPATIENT
Start: 2021-01-04 | End: 2021-02-05

## 2021-01-04 RX ORDER — DULAGLUTIDE 4.5 MG/.5ML
4.5 INJECTION, SOLUTION SUBCUTANEOUS WEEKLY
Qty: 2 ML | Refills: 3 | Status: SHIPPED | OUTPATIENT
Start: 2021-01-04 | End: 2021-02-03

## 2021-01-11 ENCOUNTER — OFFICE VISIT (OUTPATIENT)
Dept: PODIATRY | Facility: CLINIC | Age: 76
End: 2021-01-11
Payer: MEDICARE

## 2021-01-11 ENCOUNTER — APPOINTMENT (OUTPATIENT)
Dept: RADIOLOGY | Facility: OTHER | Age: 76
End: 2021-01-11
Attending: PODIATRIST
Payer: MEDICARE

## 2021-01-11 VITALS
HEIGHT: 63 IN | WEIGHT: 239 LBS | BODY MASS INDEX: 42.35 KG/M2 | SYSTOLIC BLOOD PRESSURE: 149 MMHG | DIASTOLIC BLOOD PRESSURE: 72 MMHG | HEART RATE: 84 BPM

## 2021-01-11 DIAGNOSIS — M79.672 LEFT FOOT PAIN: ICD-10-CM

## 2021-01-11 DIAGNOSIS — L84 PRE-ULCERATIVE CORN OR CALLOUS: ICD-10-CM

## 2021-01-11 DIAGNOSIS — E11.49 TYPE II DIABETES MELLITUS WITH NEUROLOGICAL MANIFESTATIONS: ICD-10-CM

## 2021-01-11 DIAGNOSIS — G31.84 MILD COGNITIVE IMPAIRMENT: ICD-10-CM

## 2021-01-11 DIAGNOSIS — B35.1 DERMATOPHYTOSIS OF NAIL: ICD-10-CM

## 2021-01-11 DIAGNOSIS — S90.30XA CONTUSION OF DORSUM OF FOOT: ICD-10-CM

## 2021-01-11 DIAGNOSIS — F41.9 ANXIETY, MILD: ICD-10-CM

## 2021-01-11 DIAGNOSIS — M79.672 LEFT FOOT PAIN: Primary | ICD-10-CM

## 2021-01-11 PROCEDURE — 11056 ROUTINE FOOT CARE: ICD-10-PCS | Mod: Q9,S$GLB,, | Performed by: PODIATRIST

## 2021-01-11 PROCEDURE — 1125F AMNT PAIN NOTED PAIN PRSNT: CPT | Mod: S$GLB,,, | Performed by: PODIATRIST

## 2021-01-11 PROCEDURE — 3078F PR MOST RECENT DIASTOLIC BLOOD PRESSURE < 80 MM HG: ICD-10-PCS | Mod: CPTII,S$GLB,, | Performed by: PODIATRIST

## 2021-01-11 PROCEDURE — 3078F DIAST BP <80 MM HG: CPT | Mod: CPTII,S$GLB,, | Performed by: PODIATRIST

## 2021-01-11 PROCEDURE — 3288F PR FALLS RISK ASSESSMENT DOCUMENTED: ICD-10-PCS | Mod: CPTII,S$GLB,, | Performed by: PODIATRIST

## 2021-01-11 PROCEDURE — 73630 X-RAY EXAM OF FOOT: CPT | Mod: TC,LT

## 2021-01-11 PROCEDURE — 1159F MED LIST DOCD IN RCRD: CPT | Mod: S$GLB,,, | Performed by: PODIATRIST

## 2021-01-11 PROCEDURE — 73630 XR FOOT COMPLETE 3 VIEW LEFT: ICD-10-PCS | Mod: 26,LT,, | Performed by: RADIOLOGY

## 2021-01-11 PROCEDURE — 3008F PR BODY MASS INDEX (BMI) DOCUMENTED: ICD-10-PCS | Mod: CPTII,S$GLB,, | Performed by: PODIATRIST

## 2021-01-11 PROCEDURE — 99999 PR PBB SHADOW E&M-EST. PATIENT-LVL V: ICD-10-PCS | Mod: PBBFAC,,, | Performed by: PODIATRIST

## 2021-01-11 PROCEDURE — 3072F LOW RISK FOR RETINOPATHY: CPT | Mod: S$GLB,,, | Performed by: PODIATRIST

## 2021-01-11 PROCEDURE — 1125F PR PAIN SEVERITY QUANTIFIED, PAIN PRESENT: ICD-10-PCS | Mod: S$GLB,,, | Performed by: PODIATRIST

## 2021-01-11 PROCEDURE — 3072F PR LOW RISK FOR RETINOPATHY: ICD-10-PCS | Mod: S$GLB,,, | Performed by: PODIATRIST

## 2021-01-11 PROCEDURE — 11721 DEBRIDE NAIL 6 OR MORE: CPT | Mod: 59,Q9,S$GLB, | Performed by: PODIATRIST

## 2021-01-11 PROCEDURE — 3077F PR MOST RECENT SYSTOLIC BLOOD PRESSURE >= 140 MM HG: ICD-10-PCS | Mod: CPTII,S$GLB,, | Performed by: PODIATRIST

## 2021-01-11 PROCEDURE — 3052F PR MOST RECENT HEMOGLOBIN A1C LEVEL 8.0 - < 9.0%: ICD-10-PCS | Mod: CPTII,S$GLB,, | Performed by: PODIATRIST

## 2021-01-11 PROCEDURE — 3288F FALL RISK ASSESSMENT DOCD: CPT | Mod: CPTII,S$GLB,, | Performed by: PODIATRIST

## 2021-01-11 PROCEDURE — 3052F HG A1C>EQUAL 8.0%<EQUAL 9.0%: CPT | Mod: CPTII,S$GLB,, | Performed by: PODIATRIST

## 2021-01-11 PROCEDURE — 3077F SYST BP >= 140 MM HG: CPT | Mod: CPTII,S$GLB,, | Performed by: PODIATRIST

## 2021-01-11 PROCEDURE — 99213 OFFICE O/P EST LOW 20 MIN: CPT | Mod: 25,S$GLB,, | Performed by: PODIATRIST

## 2021-01-11 PROCEDURE — 3008F BODY MASS INDEX DOCD: CPT | Mod: CPTII,S$GLB,, | Performed by: PODIATRIST

## 2021-01-11 PROCEDURE — 1101F PT FALLS ASSESS-DOCD LE1/YR: CPT | Mod: CPTII,S$GLB,, | Performed by: PODIATRIST

## 2021-01-11 PROCEDURE — 99999 PR PBB SHADOW E&M-EST. PATIENT-LVL V: CPT | Mod: PBBFAC,,, | Performed by: PODIATRIST

## 2021-01-11 PROCEDURE — 1101F PR PT FALLS ASSESS DOC 0-1 FALLS W/OUT INJ PAST YR: ICD-10-PCS | Mod: CPTII,S$GLB,, | Performed by: PODIATRIST

## 2021-01-11 PROCEDURE — 99213 PR OFFICE/OUTPT VISIT, EST, LEVL III, 20-29 MIN: ICD-10-PCS | Mod: 25,S$GLB,, | Performed by: PODIATRIST

## 2021-01-11 PROCEDURE — 1159F PR MEDICATION LIST DOCUMENTED IN MEDICAL RECORD: ICD-10-PCS | Mod: S$GLB,,, | Performed by: PODIATRIST

## 2021-01-11 PROCEDURE — 73630 X-RAY EXAM OF FOOT: CPT | Mod: 26,LT,, | Performed by: RADIOLOGY

## 2021-01-11 PROCEDURE — 11721 ROUTINE FOOT CARE: ICD-10-PCS | Mod: 59,Q9,S$GLB, | Performed by: PODIATRIST

## 2021-01-11 PROCEDURE — 11056 PARNG/CUTG B9 HYPRKR LES 2-4: CPT | Mod: Q9,S$GLB,, | Performed by: PODIATRIST

## 2021-01-11 RX ORDER — BUSPIRONE HYDROCHLORIDE 5 MG/1
5 TABLET ORAL 2 TIMES DAILY
Qty: 60 TABLET | Refills: 2 | Status: SHIPPED | OUTPATIENT
Start: 2021-01-11 | End: 2021-01-24 | Stop reason: SDUPTHER

## 2021-01-11 RX ORDER — AMOXICILLIN 500 MG/1
CAPSULE ORAL
COMMUNITY
Start: 2021-01-09 | End: 2021-01-24

## 2021-01-11 RX ORDER — DICLOFENAC SODIUM 10 MG/G
2 GEL TOPICAL 4 TIMES DAILY PRN
Qty: 500 G | Refills: 5 | Status: SHIPPED | OUTPATIENT
Start: 2021-01-11

## 2021-01-11 RX ORDER — CHLORHEXIDINE GLUCONATE ORAL RINSE 1.2 MG/ML
SOLUTION DENTAL
COMMUNITY
Start: 2021-01-05 | End: 2021-01-24

## 2021-01-11 RX ORDER — DONEPEZIL HYDROCHLORIDE 5 MG/1
5 TABLET, FILM COATED ORAL NIGHTLY
Qty: 30 TABLET | Refills: 2 | Status: SHIPPED | OUTPATIENT
Start: 2021-01-11 | End: 2021-01-24 | Stop reason: SDUPTHER

## 2021-01-21 ENCOUNTER — PATIENT MESSAGE (OUTPATIENT)
Dept: PRIMARY CARE CLINIC | Facility: CLINIC | Age: 76
End: 2021-01-21

## 2021-01-21 ENCOUNTER — OFFICE VISIT (OUTPATIENT)
Dept: PRIMARY CARE CLINIC | Facility: CLINIC | Age: 76
End: 2021-01-21
Payer: MEDICARE

## 2021-01-21 DIAGNOSIS — E11.22 TYPE 2 DIABETES MELLITUS WITH STAGE 3A CHRONIC KIDNEY DISEASE, WITH LONG-TERM CURRENT USE OF INSULIN: ICD-10-CM

## 2021-01-21 DIAGNOSIS — I25.10 CORONARY ARTERY DISEASE INVOLVING NATIVE CORONARY ARTERY OF NATIVE HEART WITHOUT ANGINA PECTORIS: ICD-10-CM

## 2021-01-21 DIAGNOSIS — Z79.4 TYPE 2 DIABETES MELLITUS WITH STAGE 3A CHRONIC KIDNEY DISEASE, WITH LONG-TERM CURRENT USE OF INSULIN: ICD-10-CM

## 2021-01-21 DIAGNOSIS — E66.01 MORBID OBESITY WITH BMI OF 40.0-44.9, ADULT: ICD-10-CM

## 2021-01-21 DIAGNOSIS — N18.31 TYPE 2 DIABETES MELLITUS WITH STAGE 3A CHRONIC KIDNEY DISEASE, WITH LONG-TERM CURRENT USE OF INSULIN: ICD-10-CM

## 2021-01-21 DIAGNOSIS — G31.84 MILD COGNITIVE IMPAIRMENT: ICD-10-CM

## 2021-01-21 DIAGNOSIS — I11.9 HYPERTENSIVE HEART DISEASE WITHOUT HEART FAILURE: Primary | ICD-10-CM

## 2021-01-21 DIAGNOSIS — F41.9 ANXIETY, MILD: ICD-10-CM

## 2021-01-21 DIAGNOSIS — I10 ESSENTIAL HYPERTENSION: ICD-10-CM

## 2021-01-21 PROCEDURE — 99499 UNLISTED E&M SERVICE: CPT | Mod: 95,,, | Performed by: INTERNAL MEDICINE

## 2021-01-21 PROCEDURE — 99214 PR OFFICE/OUTPT VISIT, EST, LEVL IV, 30-39 MIN: ICD-10-PCS | Mod: 95,,, | Performed by: INTERNAL MEDICINE

## 2021-01-21 PROCEDURE — 3075F SYST BP GE 130 - 139MM HG: CPT | Mod: CPTII,95,, | Performed by: INTERNAL MEDICINE

## 2021-01-21 PROCEDURE — 3052F HG A1C>EQUAL 8.0%<EQUAL 9.0%: CPT | Mod: CPTII,95,, | Performed by: INTERNAL MEDICINE

## 2021-01-21 PROCEDURE — 3078F DIAST BP <80 MM HG: CPT | Mod: CPTII,95,, | Performed by: INTERNAL MEDICINE

## 2021-01-21 PROCEDURE — 3078F PR MOST RECENT DIASTOLIC BLOOD PRESSURE < 80 MM HG: ICD-10-PCS | Mod: CPTII,95,, | Performed by: INTERNAL MEDICINE

## 2021-01-21 PROCEDURE — 3072F PR LOW RISK FOR RETINOPATHY: ICD-10-PCS | Mod: 95,,, | Performed by: INTERNAL MEDICINE

## 2021-01-21 PROCEDURE — 3052F PR MOST RECENT HEMOGLOBIN A1C LEVEL 8.0 - < 9.0%: ICD-10-PCS | Mod: CPTII,95,, | Performed by: INTERNAL MEDICINE

## 2021-01-21 PROCEDURE — 99214 OFFICE O/P EST MOD 30 MIN: CPT | Mod: 95,,, | Performed by: INTERNAL MEDICINE

## 2021-01-21 PROCEDURE — 1159F PR MEDICATION LIST DOCUMENTED IN MEDICAL RECORD: ICD-10-PCS | Mod: 95,,, | Performed by: INTERNAL MEDICINE

## 2021-01-21 PROCEDURE — 3075F PR MOST RECENT SYSTOLIC BLOOD PRESS GE 130-139MM HG: ICD-10-PCS | Mod: CPTII,95,, | Performed by: INTERNAL MEDICINE

## 2021-01-21 PROCEDURE — 1159F MED LIST DOCD IN RCRD: CPT | Mod: 95,,, | Performed by: INTERNAL MEDICINE

## 2021-01-21 PROCEDURE — 3072F LOW RISK FOR RETINOPATHY: CPT | Mod: 95,,, | Performed by: INTERNAL MEDICINE

## 2021-01-21 PROCEDURE — 99499 RISK ADDL DX/OHS AUDIT: ICD-10-PCS | Mod: 95,,, | Performed by: INTERNAL MEDICINE

## 2021-01-24 VITALS — DIASTOLIC BLOOD PRESSURE: 79 MMHG | HEART RATE: 86 BPM | SYSTOLIC BLOOD PRESSURE: 133 MMHG

## 2021-01-24 RX ORDER — IRBESARTAN 300 MG/1
300 TABLET ORAL NIGHTLY
Qty: 90 TABLET | Refills: 1 | Status: SHIPPED | OUTPATIENT
Start: 2021-01-24 | End: 2021-05-23 | Stop reason: SDUPTHER

## 2021-01-24 RX ORDER — CLOPIDOGREL BISULFATE 75 MG/1
75 TABLET ORAL DAILY
Qty: 90 TABLET | Refills: 1 | Status: SHIPPED | OUTPATIENT
Start: 2021-01-24 | End: 2021-05-23 | Stop reason: SDUPTHER

## 2021-01-24 RX ORDER — DONEPEZIL HYDROCHLORIDE 5 MG/1
5 TABLET, FILM COATED ORAL NIGHTLY
Qty: 90 TABLET | Refills: 1 | Status: SHIPPED | OUTPATIENT
Start: 2021-01-24 | End: 2021-05-23 | Stop reason: SDUPTHER

## 2021-01-24 RX ORDER — BUSPIRONE HYDROCHLORIDE 5 MG/1
5 TABLET ORAL 2 TIMES DAILY
Qty: 180 TABLET | Refills: 1 | Status: SHIPPED | OUTPATIENT
Start: 2021-01-24 | End: 2021-05-23 | Stop reason: SDUPTHER

## 2021-01-28 ENCOUNTER — PATIENT MESSAGE (OUTPATIENT)
Dept: ENDOCRINOLOGY | Facility: CLINIC | Age: 76
End: 2021-01-28

## 2021-02-08 DIAGNOSIS — M81.0 AGE-RELATED OSTEOPOROSIS WITHOUT CURRENT PATHOLOGICAL FRACTURE: ICD-10-CM

## 2021-02-08 DIAGNOSIS — D64.9 NORMOCYTIC ANEMIA: ICD-10-CM

## 2021-02-09 RX ORDER — GLIPIZIDE 5 MG/1
5 TABLET ORAL
Qty: 90 TABLET | Refills: 1 | Status: SHIPPED | OUTPATIENT
Start: 2021-02-09 | End: 2021-03-17 | Stop reason: SDUPTHER

## 2021-02-09 RX ORDER — FERROUS SULFATE 325(65) MG
1 TABLET ORAL DAILY
Qty: 90 TABLET | Refills: 1 | Status: SHIPPED | OUTPATIENT
Start: 2021-02-09 | End: 2021-08-09 | Stop reason: SDUPTHER

## 2021-03-14 ENCOUNTER — PATIENT MESSAGE (OUTPATIENT)
Dept: PRIMARY CARE CLINIC | Facility: CLINIC | Age: 76
End: 2021-03-14

## 2021-03-16 RX ORDER — CHLORTHALIDONE 25 MG/1
TABLET ORAL
Status: CANCELLED | OUTPATIENT
Start: 2021-03-16

## 2021-03-17 ENCOUNTER — TELEPHONE (OUTPATIENT)
Dept: PHARMACY | Facility: CLINIC | Age: 76
End: 2021-03-17

## 2021-03-17 DIAGNOSIS — N18.30 TYPE 2 DIABETES MELLITUS WITH STAGE 3 CHRONIC KIDNEY DISEASE AND HYPERTENSION: Primary | ICD-10-CM

## 2021-03-17 DIAGNOSIS — E11.22 TYPE 2 DIABETES MELLITUS WITH STAGE 3 CHRONIC KIDNEY DISEASE AND HYPERTENSION: Primary | ICD-10-CM

## 2021-03-17 DIAGNOSIS — I12.9 TYPE 2 DIABETES MELLITUS WITH STAGE 3 CHRONIC KIDNEY DISEASE AND HYPERTENSION: Primary | ICD-10-CM

## 2021-03-17 DIAGNOSIS — M81.0 AGE-RELATED OSTEOPOROSIS WITHOUT CURRENT PATHOLOGICAL FRACTURE: ICD-10-CM

## 2021-03-17 RX ORDER — GLIPIZIDE 5 MG/1
10 TABLET ORAL
Qty: 90 TABLET | Refills: 1 | Status: SHIPPED | OUTPATIENT
Start: 2021-03-17 | End: 2021-08-09 | Stop reason: SDUPTHER

## 2021-03-17 RX ORDER — CHLORTHALIDONE 25 MG/1
25 TABLET ORAL DAILY
Qty: 30 TABLET | Refills: 0 | Status: SHIPPED | OUTPATIENT
Start: 2021-03-17 | End: 2021-03-17

## 2021-03-17 RX ORDER — DULAGLUTIDE 4.5 MG/.5ML
4.5 INJECTION, SOLUTION SUBCUTANEOUS
Qty: 2 ML | Refills: 3 | Status: SHIPPED | OUTPATIENT
Start: 2021-03-17 | End: 2024-04-16

## 2021-03-29 ENCOUNTER — TELEPHONE (OUTPATIENT)
Dept: PRIMARY CARE CLINIC | Facility: CLINIC | Age: 76
End: 2021-03-29

## 2021-04-05 ENCOUNTER — PATIENT OUTREACH (OUTPATIENT)
Dept: ADMINISTRATIVE | Facility: OTHER | Age: 76
End: 2021-04-05

## 2021-04-05 DIAGNOSIS — Z12.31 ENCOUNTER FOR SCREENING MAMMOGRAM FOR MALIGNANT NEOPLASM OF BREAST: Primary | ICD-10-CM

## 2021-04-07 ENCOUNTER — OFFICE VISIT (OUTPATIENT)
Dept: ORTHOPEDICS | Facility: CLINIC | Age: 76
End: 2021-04-07
Payer: MEDICARE

## 2021-04-07 VITALS — BODY MASS INDEX: 41.86 KG/M2 | WEIGHT: 236.25 LBS | HEIGHT: 63 IN

## 2021-04-07 DIAGNOSIS — M17.11 PRIMARY OSTEOARTHRITIS OF RIGHT KNEE: Primary | ICD-10-CM

## 2021-04-07 PROCEDURE — 1159F MED LIST DOCD IN RCRD: CPT | Mod: S$GLB,,, | Performed by: ORTHOPAEDIC SURGERY

## 2021-04-07 PROCEDURE — 1125F AMNT PAIN NOTED PAIN PRSNT: CPT | Mod: S$GLB,,, | Performed by: ORTHOPAEDIC SURGERY

## 2021-04-07 PROCEDURE — 3072F PR LOW RISK FOR RETINOPATHY: ICD-10-PCS | Mod: S$GLB,,, | Performed by: ORTHOPAEDIC SURGERY

## 2021-04-07 PROCEDURE — 1159F PR MEDICATION LIST DOCUMENTED IN MEDICAL RECORD: ICD-10-PCS | Mod: S$GLB,,, | Performed by: ORTHOPAEDIC SURGERY

## 2021-04-07 PROCEDURE — 99999 PR PBB SHADOW E&M-EST. PATIENT-LVL IV: CPT | Mod: PBBFAC,,, | Performed by: ORTHOPAEDIC SURGERY

## 2021-04-07 PROCEDURE — 1101F PT FALLS ASSESS-DOCD LE1/YR: CPT | Mod: CPTII,S$GLB,, | Performed by: ORTHOPAEDIC SURGERY

## 2021-04-07 PROCEDURE — 1101F PR PT FALLS ASSESS DOC 0-1 FALLS W/OUT INJ PAST YR: ICD-10-PCS | Mod: CPTII,S$GLB,, | Performed by: ORTHOPAEDIC SURGERY

## 2021-04-07 PROCEDURE — 3008F PR BODY MASS INDEX (BMI) DOCUMENTED: ICD-10-PCS | Mod: CPTII,S$GLB,, | Performed by: ORTHOPAEDIC SURGERY

## 2021-04-07 PROCEDURE — 3072F LOW RISK FOR RETINOPATHY: CPT | Mod: S$GLB,,, | Performed by: ORTHOPAEDIC SURGERY

## 2021-04-07 PROCEDURE — 3008F BODY MASS INDEX DOCD: CPT | Mod: CPTII,S$GLB,, | Performed by: ORTHOPAEDIC SURGERY

## 2021-04-07 PROCEDURE — 1125F PR PAIN SEVERITY QUANTIFIED, PAIN PRESENT: ICD-10-PCS | Mod: S$GLB,,, | Performed by: ORTHOPAEDIC SURGERY

## 2021-04-07 PROCEDURE — 99213 OFFICE O/P EST LOW 20 MIN: CPT | Mod: S$GLB,,, | Performed by: ORTHOPAEDIC SURGERY

## 2021-04-07 PROCEDURE — 3288F PR FALLS RISK ASSESSMENT DOCUMENTED: ICD-10-PCS | Mod: CPTII,S$GLB,, | Performed by: ORTHOPAEDIC SURGERY

## 2021-04-07 PROCEDURE — 3288F FALL RISK ASSESSMENT DOCD: CPT | Mod: CPTII,S$GLB,, | Performed by: ORTHOPAEDIC SURGERY

## 2021-04-07 PROCEDURE — 99999 PR PBB SHADOW E&M-EST. PATIENT-LVL IV: ICD-10-PCS | Mod: PBBFAC,,, | Performed by: ORTHOPAEDIC SURGERY

## 2021-04-07 PROCEDURE — 99213 PR OFFICE/OUTPT VISIT, EST, LEVL III, 20-29 MIN: ICD-10-PCS | Mod: S$GLB,,, | Performed by: ORTHOPAEDIC SURGERY

## 2021-04-12 ENCOUNTER — TELEPHONE (OUTPATIENT)
Dept: PAIN MEDICINE | Facility: CLINIC | Age: 76
End: 2021-04-12

## 2021-04-12 ENCOUNTER — OFFICE VISIT (OUTPATIENT)
Dept: PODIATRY | Facility: CLINIC | Age: 76
End: 2021-04-12
Payer: MEDICARE

## 2021-04-12 ENCOUNTER — OFFICE VISIT (OUTPATIENT)
Dept: PAIN MEDICINE | Facility: CLINIC | Age: 76
End: 2021-04-12
Payer: MEDICARE

## 2021-04-12 ENCOUNTER — OFFICE VISIT (OUTPATIENT)
Dept: CARDIOLOGY | Facility: CLINIC | Age: 76
End: 2021-04-12
Payer: MEDICARE

## 2021-04-12 VITALS
DIASTOLIC BLOOD PRESSURE: 80 MMHG | HEIGHT: 62 IN | OXYGEN SATURATION: 100 % | TEMPERATURE: 98 F | RESPIRATION RATE: 98 BRPM | WEIGHT: 238.13 LBS | BODY MASS INDEX: 43.82 KG/M2 | HEART RATE: 80 BPM | SYSTOLIC BLOOD PRESSURE: 130 MMHG

## 2021-04-12 VITALS
SYSTOLIC BLOOD PRESSURE: 125 MMHG | HEART RATE: 77 BPM | BODY MASS INDEX: 43.82 KG/M2 | DIASTOLIC BLOOD PRESSURE: 69 MMHG | HEIGHT: 62 IN | WEIGHT: 238.13 LBS

## 2021-04-12 VITALS
DIASTOLIC BLOOD PRESSURE: 56 MMHG | WEIGHT: 238.56 LBS | HEIGHT: 62 IN | BODY MASS INDEX: 43.9 KG/M2 | SYSTOLIC BLOOD PRESSURE: 110 MMHG | HEART RATE: 83 BPM | OXYGEN SATURATION: 98 %

## 2021-04-12 DIAGNOSIS — E78.5 DYSLIPIDEMIA, GOAL LDL BELOW 70: ICD-10-CM

## 2021-04-12 DIAGNOSIS — N18.31 STAGE 3A CHRONIC KIDNEY DISEASE: ICD-10-CM

## 2021-04-12 DIAGNOSIS — T73.3XXA FATIGUE DUE TO EXCESSIVE EXERTION, INITIAL ENCOUNTER: ICD-10-CM

## 2021-04-12 DIAGNOSIS — R06.09 DOE (DYSPNEA ON EXERTION): ICD-10-CM

## 2021-04-12 DIAGNOSIS — E11.59 OBESITY, DIABETES, AND HYPERTENSION SYNDROME: ICD-10-CM

## 2021-04-12 DIAGNOSIS — R53.81 PHYSICAL DECONDITIONING: ICD-10-CM

## 2021-04-12 DIAGNOSIS — E11.49 TYPE II DIABETES MELLITUS WITH NEUROLOGICAL MANIFESTATIONS: ICD-10-CM

## 2021-04-12 DIAGNOSIS — Z96.652 HISTORY OF LEFT KNEE REPLACEMENT: Primary | ICD-10-CM

## 2021-04-12 DIAGNOSIS — M17.11 PRIMARY OSTEOARTHRITIS OF RIGHT KNEE: Primary | ICD-10-CM

## 2021-04-12 DIAGNOSIS — E11.69 OBESITY, DIABETES, AND HYPERTENSION SYNDROME: ICD-10-CM

## 2021-04-12 DIAGNOSIS — L85.3 DRY SKIN: ICD-10-CM

## 2021-04-12 DIAGNOSIS — I25.2 HISTORY OF MI (MYOCARDIAL INFARCTION): ICD-10-CM

## 2021-04-12 DIAGNOSIS — M17.11 PRIMARY OSTEOARTHRITIS OF RIGHT KNEE: ICD-10-CM

## 2021-04-12 DIAGNOSIS — I10 ESSENTIAL HYPERTENSION: ICD-10-CM

## 2021-04-12 DIAGNOSIS — I12.9 TYPE 2 DIABETES MELLITUS WITH STAGE 3 CHRONIC KIDNEY DISEASE AND HYPERTENSION: ICD-10-CM

## 2021-04-12 DIAGNOSIS — N18.30 TYPE 2 DIABETES MELLITUS WITH STAGE 3 CHRONIC KIDNEY DISEASE AND HYPERTENSION: ICD-10-CM

## 2021-04-12 DIAGNOSIS — Z95.1 S/P CABG (CORONARY ARTERY BYPASS GRAFT): ICD-10-CM

## 2021-04-12 DIAGNOSIS — I25.118 CORONARY ARTERY DISEASE OF NATIVE ARTERY OF NATIVE HEART WITH STABLE ANGINA PECTORIS: Primary | ICD-10-CM

## 2021-04-12 DIAGNOSIS — Z95.5 S/P DRUG ELUTING CORONARY STENT PLACEMENT: ICD-10-CM

## 2021-04-12 DIAGNOSIS — Z71.89 COUNSELING ON HEALTH PROMOTION AND DISEASE PREVENTION: ICD-10-CM

## 2021-04-12 DIAGNOSIS — G47.33 OSA ON CPAP: ICD-10-CM

## 2021-04-12 DIAGNOSIS — B35.1 DERMATOPHYTOSIS OF NAIL: Primary | ICD-10-CM

## 2021-04-12 DIAGNOSIS — E66.01 MORBID OBESITY WITH BODY MASS INDEX (BMI) OF 40.0 OR HIGHER: ICD-10-CM

## 2021-04-12 DIAGNOSIS — E11.22 TYPE 2 DIABETES MELLITUS WITH STAGE 3 CHRONIC KIDNEY DISEASE AND HYPERTENSION: ICD-10-CM

## 2021-04-12 DIAGNOSIS — E66.9 OBESITY, DIABETES, AND HYPERTENSION SYNDROME: ICD-10-CM

## 2021-04-12 DIAGNOSIS — E55.9 VITAMIN D DEFICIENCY, UNSPECIFIED: ICD-10-CM

## 2021-04-12 DIAGNOSIS — I15.2 OBESITY, DIABETES, AND HYPERTENSION SYNDROME: ICD-10-CM

## 2021-04-12 PROCEDURE — 1101F PR PT FALLS ASSESS DOC 0-1 FALLS W/OUT INJ PAST YR: ICD-10-PCS | Mod: CPTII,S$GLB,, | Performed by: PODIATRIST

## 2021-04-12 PROCEDURE — 1101F PR PT FALLS ASSESS DOC 0-1 FALLS W/OUT INJ PAST YR: ICD-10-PCS | Mod: CPTII,S$GLB,, | Performed by: NURSE PRACTITIONER

## 2021-04-12 PROCEDURE — 1159F PR MEDICATION LIST DOCUMENTED IN MEDICAL RECORD: ICD-10-PCS | Mod: S$GLB,,, | Performed by: NURSE PRACTITIONER

## 2021-04-12 PROCEDURE — 93005 ELECTROCARDIOGRAM TRACING: CPT | Mod: S$GLB,,, | Performed by: NURSE PRACTITIONER

## 2021-04-12 PROCEDURE — 1101F PT FALLS ASSESS-DOCD LE1/YR: CPT | Mod: CPTII,S$GLB,, | Performed by: NURSE PRACTITIONER

## 2021-04-12 PROCEDURE — 3288F FALL RISK ASSESSMENT DOCD: CPT | Mod: CPTII,S$GLB,, | Performed by: PODIATRIST

## 2021-04-12 PROCEDURE — 3008F BODY MASS INDEX DOCD: CPT | Mod: CPTII,S$GLB,, | Performed by: NURSE PRACTITIONER

## 2021-04-12 PROCEDURE — 3288F FALL RISK ASSESSMENT DOCD: CPT | Mod: CPTII,S$GLB,, | Performed by: NURSE PRACTITIONER

## 2021-04-12 PROCEDURE — 1125F AMNT PAIN NOTED PAIN PRSNT: CPT | Mod: S$GLB,,, | Performed by: NURSE PRACTITIONER

## 2021-04-12 PROCEDURE — 99499 RISK ADDL DX/OHS AUDIT: ICD-10-PCS | Mod: S$GLB,,, | Performed by: NURSE PRACTITIONER

## 2021-04-12 PROCEDURE — 3288F PR FALLS RISK ASSESSMENT DOCUMENTED: ICD-10-PCS | Mod: CPTII,S$GLB,, | Performed by: NURSE PRACTITIONER

## 2021-04-12 PROCEDURE — 3072F LOW RISK FOR RETINOPATHY: CPT | Mod: S$GLB,,, | Performed by: NURSE PRACTITIONER

## 2021-04-12 PROCEDURE — 93005 EKG 12-LEAD: ICD-10-PCS | Mod: S$GLB,,, | Performed by: NURSE PRACTITIONER

## 2021-04-12 PROCEDURE — 3008F PR BODY MASS INDEX (BMI) DOCUMENTED: ICD-10-PCS | Mod: CPTII,S$GLB,, | Performed by: NURSE PRACTITIONER

## 2021-04-12 PROCEDURE — 99214 OFFICE O/P EST MOD 30 MIN: CPT | Mod: S$GLB,,, | Performed by: NURSE PRACTITIONER

## 2021-04-12 PROCEDURE — 99215 PR OFFICE/OUTPT VISIT, EST, LEVL V, 40-54 MIN: ICD-10-PCS | Mod: S$GLB,,, | Performed by: NURSE PRACTITIONER

## 2021-04-12 PROCEDURE — 99214 PR OFFICE/OUTPT VISIT, EST, LEVL IV, 30-39 MIN: ICD-10-PCS | Mod: S$GLB,,, | Performed by: NURSE PRACTITIONER

## 2021-04-12 PROCEDURE — 11721 ROUTINE FOOT CARE: ICD-10-PCS | Mod: Q9,S$GLB,, | Performed by: PODIATRIST

## 2021-04-12 PROCEDURE — 99499 UNLISTED E&M SERVICE: CPT | Mod: S$GLB,,, | Performed by: NURSE PRACTITIONER

## 2021-04-12 PROCEDURE — 3072F PR LOW RISK FOR RETINOPATHY: ICD-10-PCS | Mod: S$GLB,,, | Performed by: PODIATRIST

## 2021-04-12 PROCEDURE — 3072F PR LOW RISK FOR RETINOPATHY: ICD-10-PCS | Mod: S$GLB,,, | Performed by: NURSE PRACTITIONER

## 2021-04-12 PROCEDURE — 93010 ELECTROCARDIOGRAM REPORT: CPT | Mod: S$GLB,,, | Performed by: INTERNAL MEDICINE

## 2021-04-12 PROCEDURE — 3008F BODY MASS INDEX DOCD: CPT | Mod: CPTII,S$GLB,, | Performed by: PODIATRIST

## 2021-04-12 PROCEDURE — 3008F PR BODY MASS INDEX (BMI) DOCUMENTED: ICD-10-PCS | Mod: CPTII,S$GLB,, | Performed by: PODIATRIST

## 2021-04-12 PROCEDURE — 1126F AMNT PAIN NOTED NONE PRSNT: CPT | Mod: S$GLB,,, | Performed by: PODIATRIST

## 2021-04-12 PROCEDURE — 99999 PR PBB SHADOW E&M-EST. PATIENT-LVL V: ICD-10-PCS | Mod: PBBFAC,,, | Performed by: NURSE PRACTITIONER

## 2021-04-12 PROCEDURE — 99999 PR PBB SHADOW E&M-EST. PATIENT-LVL IV: CPT | Mod: PBBFAC,,, | Performed by: PODIATRIST

## 2021-04-12 PROCEDURE — 99999 PR PBB SHADOW E&M-EST. PATIENT-LVL V: CPT | Mod: PBBFAC,,, | Performed by: NURSE PRACTITIONER

## 2021-04-12 PROCEDURE — 99999 PR PBB SHADOW E&M-EST. PATIENT-LVL IV: ICD-10-PCS | Mod: PBBFAC,,, | Performed by: PODIATRIST

## 2021-04-12 PROCEDURE — 99499 UNLISTED E&M SERVICE: CPT | Mod: S$GLB,,, | Performed by: PODIATRIST

## 2021-04-12 PROCEDURE — 1125F PR PAIN SEVERITY QUANTIFIED, PAIN PRESENT: ICD-10-PCS | Mod: S$GLB,,, | Performed by: NURSE PRACTITIONER

## 2021-04-12 PROCEDURE — 99499 NO LOS: ICD-10-PCS | Mod: S$GLB,,, | Performed by: PODIATRIST

## 2021-04-12 PROCEDURE — 1126F PR PAIN SEVERITY QUANTIFIED, NO PAIN PRESENT: ICD-10-PCS | Mod: S$GLB,,, | Performed by: PODIATRIST

## 2021-04-12 PROCEDURE — 3288F PR FALLS RISK ASSESSMENT DOCUMENTED: ICD-10-PCS | Mod: CPTII,S$GLB,, | Performed by: PODIATRIST

## 2021-04-12 PROCEDURE — 11721 DEBRIDE NAIL 6 OR MORE: CPT | Mod: Q9,S$GLB,, | Performed by: PODIATRIST

## 2021-04-12 PROCEDURE — 3072F LOW RISK FOR RETINOPATHY: CPT | Mod: S$GLB,,, | Performed by: PODIATRIST

## 2021-04-12 PROCEDURE — 99215 OFFICE O/P EST HI 40 MIN: CPT | Mod: S$GLB,,, | Performed by: NURSE PRACTITIONER

## 2021-04-12 PROCEDURE — 1159F MED LIST DOCD IN RCRD: CPT | Mod: S$GLB,,, | Performed by: NURSE PRACTITIONER

## 2021-04-12 PROCEDURE — 1101F PT FALLS ASSESS-DOCD LE1/YR: CPT | Mod: CPTII,S$GLB,, | Performed by: PODIATRIST

## 2021-04-12 PROCEDURE — 93010 EKG 12-LEAD: ICD-10-PCS | Mod: S$GLB,,, | Performed by: INTERNAL MEDICINE

## 2021-04-14 ENCOUNTER — TELEPHONE (OUTPATIENT)
Dept: PODIATRY | Facility: CLINIC | Age: 76
End: 2021-04-14

## 2021-04-14 ENCOUNTER — PATIENT MESSAGE (OUTPATIENT)
Dept: CARDIOLOGY | Facility: CLINIC | Age: 76
End: 2021-04-14

## 2021-04-14 ENCOUNTER — LAB VISIT (OUTPATIENT)
Dept: LAB | Facility: HOSPITAL | Age: 76
End: 2021-04-14
Attending: NURSE PRACTITIONER
Payer: MEDICARE

## 2021-04-14 DIAGNOSIS — N18.30 TYPE 2 DIABETES MELLITUS WITH STAGE 3 CHRONIC KIDNEY DISEASE AND HYPERTENSION: Chronic | ICD-10-CM

## 2021-04-14 DIAGNOSIS — I10 ESSENTIAL HYPERTENSION: ICD-10-CM

## 2021-04-14 DIAGNOSIS — E11.22 TYPE 2 DIABETES MELLITUS WITH STAGE 3 CHRONIC KIDNEY DISEASE AND HYPERTENSION: Chronic | ICD-10-CM

## 2021-04-14 DIAGNOSIS — I12.9 TYPE 2 DIABETES MELLITUS WITH STAGE 3 CHRONIC KIDNEY DISEASE AND HYPERTENSION: Chronic | ICD-10-CM

## 2021-04-14 DIAGNOSIS — T73.3XXA FATIGUE DUE TO EXCESSIVE EXERTION, INITIAL ENCOUNTER: ICD-10-CM

## 2021-04-14 DIAGNOSIS — E55.9 VITAMIN D DEFICIENCY, UNSPECIFIED: ICD-10-CM

## 2021-04-14 LAB
ALBUMIN SERPL BCP-MCNC: 3.5 G/DL (ref 3.5–5.2)
ALP SERPL-CCNC: 87 U/L (ref 55–135)
ALT SERPL W/O P-5'-P-CCNC: 29 U/L (ref 10–44)
ANION GAP SERPL CALC-SCNC: 10 MMOL/L (ref 8–16)
AST SERPL-CCNC: 25 U/L (ref 10–40)
BASOPHILS # BLD AUTO: 0.02 K/UL (ref 0–0.2)
BASOPHILS NFR BLD: 0.4 % (ref 0–1.9)
BILIRUB SERPL-MCNC: 0.5 MG/DL (ref 0.1–1)
BUN SERPL-MCNC: 19 MG/DL (ref 8–23)
CALCIUM SERPL-MCNC: 9.6 MG/DL (ref 8.7–10.5)
CHLORIDE SERPL-SCNC: 103 MMOL/L (ref 95–110)
CO2 SERPL-SCNC: 24 MMOL/L (ref 23–29)
CREAT SERPL-MCNC: 1.1 MG/DL (ref 0.5–1.4)
DIFFERENTIAL METHOD: ABNORMAL
EOSINOPHIL # BLD AUTO: 0.1 K/UL (ref 0–0.5)
EOSINOPHIL NFR BLD: 2.6 % (ref 0–8)
ERYTHROCYTE [DISTWIDTH] IN BLOOD BY AUTOMATED COUNT: 14.6 % (ref 11.5–14.5)
EST. GFR  (AFRICAN AMERICAN): 57.2 ML/MIN/1.73 M^2
EST. GFR  (NON AFRICAN AMERICAN): 49.6 ML/MIN/1.73 M^2
GLUCOSE SERPL-MCNC: 142 MG/DL (ref 70–110)
HCT VFR BLD AUTO: 35.3 % (ref 37–48.5)
HGB BLD-MCNC: 11.4 G/DL (ref 12–16)
IMM GRANULOCYTES # BLD AUTO: 0.02 K/UL (ref 0–0.04)
IMM GRANULOCYTES NFR BLD AUTO: 0.4 % (ref 0–0.5)
LYMPHOCYTES # BLD AUTO: 1.3 K/UL (ref 1–4.8)
LYMPHOCYTES NFR BLD: 23.5 % (ref 18–48)
MAGNESIUM SERPL-MCNC: 1.6 MG/DL (ref 1.6–2.6)
MCH RBC QN AUTO: 27.7 PG (ref 27–31)
MCHC RBC AUTO-ENTMCNC: 32.3 G/DL (ref 32–36)
MCV RBC AUTO: 86 FL (ref 82–98)
MONOCYTES # BLD AUTO: 0.4 K/UL (ref 0.3–1)
MONOCYTES NFR BLD: 7.5 % (ref 4–15)
NEUTROPHILS # BLD AUTO: 3.5 K/UL (ref 1.8–7.7)
NEUTROPHILS NFR BLD: 65.6 % (ref 38–73)
NRBC BLD-RTO: 0 /100 WBC
PLATELET # BLD AUTO: 215 K/UL (ref 150–450)
PMV BLD AUTO: 10.5 FL (ref 9.2–12.9)
POTASSIUM SERPL-SCNC: 4.7 MMOL/L (ref 3.5–5.1)
PROT SERPL-MCNC: 8.4 G/DL (ref 6–8.4)
RBC # BLD AUTO: 4.12 M/UL (ref 4–5.4)
SODIUM SERPL-SCNC: 137 MMOL/L (ref 136–145)
WBC # BLD AUTO: 5.36 K/UL (ref 3.9–12.7)

## 2021-04-14 PROCEDURE — 85025 COMPLETE CBC W/AUTO DIFF WBC: CPT | Performed by: NURSE PRACTITIONER

## 2021-04-14 PROCEDURE — 83735 ASSAY OF MAGNESIUM: CPT | Performed by: NURSE PRACTITIONER

## 2021-04-14 PROCEDURE — 80053 COMPREHEN METABOLIC PANEL: CPT | Performed by: NURSE PRACTITIONER

## 2021-04-14 PROCEDURE — 36415 COLL VENOUS BLD VENIPUNCTURE: CPT | Mod: PN | Performed by: NURSE PRACTITIONER

## 2021-04-14 PROCEDURE — 83036 HEMOGLOBIN GLYCOSYLATED A1C: CPT | Performed by: NURSE PRACTITIONER

## 2021-04-14 PROCEDURE — 84443 ASSAY THYROID STIM HORMONE: CPT | Performed by: NURSE PRACTITIONER

## 2021-04-14 PROCEDURE — 82306 VITAMIN D 25 HYDROXY: CPT | Performed by: NURSE PRACTITIONER

## 2021-04-15 ENCOUNTER — PATIENT MESSAGE (OUTPATIENT)
Dept: ENDOCRINOLOGY | Facility: CLINIC | Age: 76
End: 2021-04-15

## 2021-04-15 LAB
25(OH)D3+25(OH)D2 SERPL-MCNC: 52 NG/ML (ref 30–96)
ESTIMATED AVG GLUCOSE: 169 MG/DL (ref 68–131)
HBA1C MFR BLD: 7.5 % (ref 4–5.6)
TSH SERPL DL<=0.005 MIU/L-ACNC: 2.56 UIU/ML (ref 0.4–4)

## 2021-04-18 ENCOUNTER — PATIENT MESSAGE (OUTPATIENT)
Dept: CARDIOLOGY | Facility: CLINIC | Age: 76
End: 2021-04-18

## 2021-04-18 ENCOUNTER — PATIENT MESSAGE (OUTPATIENT)
Dept: ENDOCRINOLOGY | Facility: CLINIC | Age: 76
End: 2021-04-18

## 2021-04-20 DIAGNOSIS — G31.84 MILD COGNITIVE IMPAIRMENT: ICD-10-CM

## 2021-04-20 DIAGNOSIS — N18.30 TYPE 2 DIABETES MELLITUS WITH STAGE 3 CHRONIC KIDNEY DISEASE AND HYPERTENSION: ICD-10-CM

## 2021-04-20 DIAGNOSIS — E11.22 TYPE 2 DIABETES MELLITUS WITH STAGE 3 CHRONIC KIDNEY DISEASE AND HYPERTENSION: ICD-10-CM

## 2021-04-20 DIAGNOSIS — I12.9 TYPE 2 DIABETES MELLITUS WITH STAGE 3 CHRONIC KIDNEY DISEASE AND HYPERTENSION: ICD-10-CM

## 2021-04-30 ENCOUNTER — TELEPHONE (OUTPATIENT)
Dept: PAIN MEDICINE | Facility: CLINIC | Age: 76
End: 2021-04-30

## 2021-05-04 ENCOUNTER — OFFICE VISIT (OUTPATIENT)
Dept: ENDOCRINOLOGY | Facility: CLINIC | Age: 76
End: 2021-05-04
Payer: MEDICARE

## 2021-05-04 ENCOUNTER — TELEPHONE (OUTPATIENT)
Dept: PAIN MEDICINE | Facility: CLINIC | Age: 76
End: 2021-05-04

## 2021-05-04 VITALS
WEIGHT: 237.19 LBS | HEIGHT: 62 IN | SYSTOLIC BLOOD PRESSURE: 111 MMHG | BODY MASS INDEX: 43.65 KG/M2 | DIASTOLIC BLOOD PRESSURE: 62 MMHG

## 2021-05-04 DIAGNOSIS — M81.0 OSTEOPOROSIS WITHOUT CURRENT PATHOLOGICAL FRACTURE, UNSPECIFIED OSTEOPOROSIS TYPE: Chronic | ICD-10-CM

## 2021-05-04 DIAGNOSIS — N18.30 TYPE 2 DIABETES MELLITUS WITH STAGE 3 CHRONIC KIDNEY DISEASE AND HYPERTENSION: Primary | Chronic | ICD-10-CM

## 2021-05-04 DIAGNOSIS — E78.5 DYSLIPIDEMIA, GOAL LDL BELOW 70: ICD-10-CM

## 2021-05-04 DIAGNOSIS — I10 ESSENTIAL HYPERTENSION: Chronic | ICD-10-CM

## 2021-05-04 DIAGNOSIS — E55.9 VITAMIN D DEFICIENCY, UNSPECIFIED: ICD-10-CM

## 2021-05-04 DIAGNOSIS — E11.22 TYPE 2 DIABETES MELLITUS WITH STAGE 3 CHRONIC KIDNEY DISEASE AND HYPERTENSION: Primary | Chronic | ICD-10-CM

## 2021-05-04 DIAGNOSIS — I12.9 TYPE 2 DIABETES MELLITUS WITH STAGE 3 CHRONIC KIDNEY DISEASE AND HYPERTENSION: Primary | Chronic | ICD-10-CM

## 2021-05-04 PROCEDURE — 1159F PR MEDICATION LIST DOCUMENTED IN MEDICAL RECORD: ICD-10-PCS | Mod: S$GLB,,, | Performed by: NURSE PRACTITIONER

## 2021-05-04 PROCEDURE — 99999 PR PBB SHADOW E&M-EST. PATIENT-LVL IV: CPT | Mod: PBBFAC,,, | Performed by: NURSE PRACTITIONER

## 2021-05-04 PROCEDURE — 3008F BODY MASS INDEX DOCD: CPT | Mod: CPTII,S$GLB,, | Performed by: NURSE PRACTITIONER

## 2021-05-04 PROCEDURE — 3078F PR MOST RECENT DIASTOLIC BLOOD PRESSURE < 80 MM HG: ICD-10-PCS | Mod: CPTII,S$GLB,, | Performed by: NURSE PRACTITIONER

## 2021-05-04 PROCEDURE — 3074F PR MOST RECENT SYSTOLIC BLOOD PRESSURE < 130 MM HG: ICD-10-PCS | Mod: CPTII,S$GLB,, | Performed by: NURSE PRACTITIONER

## 2021-05-04 PROCEDURE — 3072F LOW RISK FOR RETINOPATHY: CPT | Mod: S$GLB,,, | Performed by: NURSE PRACTITIONER

## 2021-05-04 PROCEDURE — 3078F DIAST BP <80 MM HG: CPT | Mod: CPTII,S$GLB,, | Performed by: NURSE PRACTITIONER

## 2021-05-04 PROCEDURE — 99214 PR OFFICE/OUTPT VISIT, EST, LEVL IV, 30-39 MIN: ICD-10-PCS | Mod: S$GLB,,, | Performed by: NURSE PRACTITIONER

## 2021-05-04 PROCEDURE — 3072F PR LOW RISK FOR RETINOPATHY: ICD-10-PCS | Mod: S$GLB,,, | Performed by: NURSE PRACTITIONER

## 2021-05-04 PROCEDURE — 3074F SYST BP LT 130 MM HG: CPT | Mod: CPTII,S$GLB,, | Performed by: NURSE PRACTITIONER

## 2021-05-04 PROCEDURE — 1125F PR PAIN SEVERITY QUANTIFIED, PAIN PRESENT: ICD-10-PCS | Mod: S$GLB,,, | Performed by: NURSE PRACTITIONER

## 2021-05-04 PROCEDURE — 1125F AMNT PAIN NOTED PAIN PRSNT: CPT | Mod: S$GLB,,, | Performed by: NURSE PRACTITIONER

## 2021-05-04 PROCEDURE — 99214 OFFICE O/P EST MOD 30 MIN: CPT | Mod: S$GLB,,, | Performed by: NURSE PRACTITIONER

## 2021-05-04 PROCEDURE — 3051F HG A1C>EQUAL 7.0%<8.0%: CPT | Mod: CPTII,S$GLB,, | Performed by: NURSE PRACTITIONER

## 2021-05-04 PROCEDURE — 3008F PR BODY MASS INDEX (BMI) DOCUMENTED: ICD-10-PCS | Mod: CPTII,S$GLB,, | Performed by: NURSE PRACTITIONER

## 2021-05-04 PROCEDURE — 1159F MED LIST DOCD IN RCRD: CPT | Mod: S$GLB,,, | Performed by: NURSE PRACTITIONER

## 2021-05-04 PROCEDURE — 3051F PR MOST RECENT HEMOGLOBIN A1C LEVEL 7.0 - < 8.0%: ICD-10-PCS | Mod: CPTII,S$GLB,, | Performed by: NURSE PRACTITIONER

## 2021-05-04 PROCEDURE — 99999 PR PBB SHADOW E&M-EST. PATIENT-LVL IV: ICD-10-PCS | Mod: PBBFAC,,, | Performed by: NURSE PRACTITIONER

## 2021-05-05 ENCOUNTER — PROCEDURE VISIT (OUTPATIENT)
Dept: PAIN MEDICINE | Facility: CLINIC | Age: 76
End: 2021-05-05
Payer: MEDICARE

## 2021-05-05 VITALS
BODY MASS INDEX: 43.39 KG/M2 | DIASTOLIC BLOOD PRESSURE: 80 MMHG | SYSTOLIC BLOOD PRESSURE: 132 MMHG | HEIGHT: 62 IN | HEART RATE: 72 BPM | RESPIRATION RATE: 18 BRPM | OXYGEN SATURATION: 100 %

## 2021-05-05 DIAGNOSIS — M17.11 PRIMARY OSTEOARTHRITIS OF RIGHT KNEE: Primary | ICD-10-CM

## 2021-05-05 DIAGNOSIS — G89.4 CHRONIC PAIN DISORDER: ICD-10-CM

## 2021-05-05 PROCEDURE — 20611 PR DRAIN/ASP/INJECT MAJOR JOINT/BURSA W/US GUIDANCE: ICD-10-PCS | Mod: RT,S$GLB,, | Performed by: ANESTHESIOLOGY

## 2021-05-05 PROCEDURE — 20611 DRAIN/INJ JOINT/BURSA W/US: CPT | Mod: RT,S$GLB,, | Performed by: ANESTHESIOLOGY

## 2021-05-05 RX ORDER — CHLORTHALIDONE 25 MG/1
12.5 TABLET ORAL DAILY
Qty: 45 TABLET | Refills: 0 | Status: SHIPPED | OUTPATIENT
Start: 2021-05-05 | End: 2021-08-09 | Stop reason: SDUPTHER

## 2021-05-11 ENCOUNTER — TELEPHONE (OUTPATIENT)
Dept: PAIN MEDICINE | Facility: CLINIC | Age: 76
End: 2021-05-11

## 2021-05-18 ENCOUNTER — PATIENT OUTREACH (OUTPATIENT)
Dept: ADMINISTRATIVE | Facility: OTHER | Age: 76
End: 2021-05-18

## 2021-05-18 ENCOUNTER — TELEPHONE (OUTPATIENT)
Dept: PAIN MEDICINE | Facility: CLINIC | Age: 76
End: 2021-05-18

## 2021-05-18 NOTE — PT/OT/SLP PROGRESS
Occupational Therapy   Treatment    Name: Kaylee Romero  MRN: [id number]  Admitting Diagnosis:  S/P CABG (coronary artery bypass graft)       Recommendations:     Discharge Recommendations: nursing facility, skilled  Discharge Equipment Recommendations:  (TBD next level of care)  Barriers to discharge:  None    Assessment:     Kaylee Romero is a 74 y.o. female with a medical diagnosis of S/P CABG (coronary artery bypass graft).  She presents with improved motivation, and tolerant . Performance deficits affecting function are weakness, impaired endurance, impaired self care skills, impaired functional mobilty, decreased upper extremity function, decreased lower extremity function, impaired cardiopulmonary response to activity, orthopedic precautions, edema, gait instability, impaired balance.     Rehab Prognosis:  Fair; patient would benefit from acute skilled OT services to address these deficits and reach maximum level of function.       Plan:     Patient to be seen 4 x/week to address the above listed problems via self-care/home management, therapeutic activities, therapeutic exercises  · Plan of Care Expires: 10/01/19  · Plan of Care Reviewed with: patient, spouse    Subjective     Pain/Comfort:  · Pain Rating 1: 0/10  · Pain Rating Post-Intervention 1: 0/10    Objective:     Communicated with: RN prior to session.  Patient found up in chair with peripheral IV, telemetry upon OT entry to room.    General Precautions: Standard, fall, sternal   Orthopedic Precautions:N/A   Braces: N/A     Occupational Performance:        Functional Mobility/Transfers:  · Patient completed Sit <> Stand Transfer with stand by assistance  with  hand-held assist   · Patient completed Bed <> Chair Transfer using Step Transfer technique with stand by assistance with hand-held assist  · Patient completed Toilet Transfer Step Transfer technique with contact guard assistance with  hand-held assist  Functional Mobility:  Pt was able to  walk Long household distances  w/ CGA, and HHA x1    Activities of Daily Living:  · Grooming: supervision stading at sink  · Bathing: supervision standing at sink.  · Upper Body Dressing: supervision standing  · Lower Body Dressing: supervision w/ sock aid and reacher.  · Toileting: supervision seated on the toilet.       [facility] 6 Click ADL: 21    Treatment & Education:  -Pt edu on OT role/POC  -Importance of OOB activity with staff assistance  -Safety during functional t/f and mobility  - White board updated  - Multiple self care tasks/functional mobility completed-- assistance level noted above  - All questions/concerns answered within OT scope of practice      Patient left up in chair with all lines intact, call button in reach, RN notified and spouse presentEducation:      GOALS:   Multidisciplinary Problems     Occupational Therapy Goals        Problem: Occupational Therapy Goal    Goal Priority Disciplines Outcome Interventions   Occupational Therapy Goal     OT, PT/OT Ongoing (interventions implemented as appropriate)    Description:  Goals to be met by: 10/1/2019    Patient will increase functional independence with ADLs by performing:    Using sock aid for donning, and dressing stick for doffing socks LE Dressing with Set-up Assistance.  MET 9/3 POWER  Grooming while standing at sink with Set-up Assistance.   Toileting from toilet with Modified [place] and Assistive Devices as needed for hygiene and clothing management.   Bathing from  standing at sink with Modified [place].  Supine to sit with Set-up Assistance while observing sternal precautions.  Toilet transfer to toilet with Modified [place]. MET 9/3 POWER                       Time Tracking:     OT Date of Treatment: 09/03/19  OT Start Time: 1450  OT Stop Time: 1530  OT Total Time (min): 40 min    Billable Minutes:Self Care/Home Management 40 mins    Mehrdad Cross OT  9/3/2019     regular rate and rhythm/no murmur

## 2021-05-19 ENCOUNTER — OFFICE VISIT (OUTPATIENT)
Dept: PAIN MEDICINE | Facility: CLINIC | Age: 76
End: 2021-05-19
Payer: MEDICARE

## 2021-05-19 VITALS
DIASTOLIC BLOOD PRESSURE: 61 MMHG | SYSTOLIC BLOOD PRESSURE: 119 MMHG | HEART RATE: 92 BPM | HEIGHT: 62 IN | BODY MASS INDEX: 43.65 KG/M2 | WEIGHT: 237.19 LBS

## 2021-05-19 DIAGNOSIS — M17.12 PRIMARY OSTEOARTHRITIS OF LEFT KNEE: Primary | ICD-10-CM

## 2021-05-19 PROCEDURE — 3008F PR BODY MASS INDEX (BMI) DOCUMENTED: ICD-10-PCS | Mod: CPTII,S$GLB,, | Performed by: NURSE PRACTITIONER

## 2021-05-19 PROCEDURE — 3288F PR FALLS RISK ASSESSMENT DOCUMENTED: ICD-10-PCS | Mod: CPTII,S$GLB,, | Performed by: NURSE PRACTITIONER

## 2021-05-19 PROCEDURE — 99999 PR PBB SHADOW E&M-EST. PATIENT-LVL IV: ICD-10-PCS | Mod: PBBFAC,,, | Performed by: NURSE PRACTITIONER

## 2021-05-19 PROCEDURE — 99213 PR OFFICE/OUTPT VISIT, EST, LEVL III, 20-29 MIN: ICD-10-PCS | Mod: S$GLB,,, | Performed by: NURSE PRACTITIONER

## 2021-05-19 PROCEDURE — 1101F PT FALLS ASSESS-DOCD LE1/YR: CPT | Mod: CPTII,S$GLB,, | Performed by: NURSE PRACTITIONER

## 2021-05-19 PROCEDURE — 3288F FALL RISK ASSESSMENT DOCD: CPT | Mod: CPTII,S$GLB,, | Performed by: NURSE PRACTITIONER

## 2021-05-19 PROCEDURE — 1159F MED LIST DOCD IN RCRD: CPT | Mod: S$GLB,,, | Performed by: NURSE PRACTITIONER

## 2021-05-19 PROCEDURE — 1101F PR PT FALLS ASSESS DOC 0-1 FALLS W/OUT INJ PAST YR: ICD-10-PCS | Mod: CPTII,S$GLB,, | Performed by: NURSE PRACTITIONER

## 2021-05-19 PROCEDURE — 3072F PR LOW RISK FOR RETINOPATHY: ICD-10-PCS | Mod: S$GLB,,, | Performed by: NURSE PRACTITIONER

## 2021-05-19 PROCEDURE — 1125F AMNT PAIN NOTED PAIN PRSNT: CPT | Mod: S$GLB,,, | Performed by: NURSE PRACTITIONER

## 2021-05-19 PROCEDURE — 3072F LOW RISK FOR RETINOPATHY: CPT | Mod: S$GLB,,, | Performed by: NURSE PRACTITIONER

## 2021-05-19 PROCEDURE — 3008F BODY MASS INDEX DOCD: CPT | Mod: CPTII,S$GLB,, | Performed by: NURSE PRACTITIONER

## 2021-05-19 PROCEDURE — 99213 OFFICE O/P EST LOW 20 MIN: CPT | Mod: S$GLB,,, | Performed by: NURSE PRACTITIONER

## 2021-05-19 PROCEDURE — 1125F PR PAIN SEVERITY QUANTIFIED, PAIN PRESENT: ICD-10-PCS | Mod: S$GLB,,, | Performed by: NURSE PRACTITIONER

## 2021-05-19 PROCEDURE — 99999 PR PBB SHADOW E&M-EST. PATIENT-LVL IV: CPT | Mod: PBBFAC,,, | Performed by: NURSE PRACTITIONER

## 2021-05-19 PROCEDURE — 1159F PR MEDICATION LIST DOCUMENTED IN MEDICAL RECORD: ICD-10-PCS | Mod: S$GLB,,, | Performed by: NURSE PRACTITIONER

## 2021-05-21 ENCOUNTER — OFFICE VISIT (OUTPATIENT)
Dept: PRIMARY CARE CLINIC | Facility: CLINIC | Age: 76
End: 2021-05-21
Payer: MEDICARE

## 2021-05-21 VITALS
DIASTOLIC BLOOD PRESSURE: 70 MMHG | HEART RATE: 97 BPM | WEIGHT: 242.5 LBS | SYSTOLIC BLOOD PRESSURE: 112 MMHG | BODY MASS INDEX: 44.63 KG/M2 | OXYGEN SATURATION: 99 % | HEIGHT: 62 IN

## 2021-05-21 DIAGNOSIS — Z12.31 ENCOUNTER FOR SCREENING MAMMOGRAM FOR BREAST CANCER: ICD-10-CM

## 2021-05-21 DIAGNOSIS — I11.9 HYPERTENSIVE HEART DISEASE WITHOUT HEART FAILURE: Primary | ICD-10-CM

## 2021-05-21 DIAGNOSIS — E11.22 TYPE 2 DIABETES MELLITUS WITH STAGE 3A CHRONIC KIDNEY DISEASE, WITH LONG-TERM CURRENT USE OF INSULIN: ICD-10-CM

## 2021-05-21 DIAGNOSIS — I25.118 CORONARY ARTERY DISEASE OF NATIVE ARTERY OF NATIVE HEART WITH STABLE ANGINA PECTORIS: ICD-10-CM

## 2021-05-21 DIAGNOSIS — R53.81 PHYSICAL DECONDITIONING: ICD-10-CM

## 2021-05-21 DIAGNOSIS — G31.84 MILD COGNITIVE IMPAIRMENT: ICD-10-CM

## 2021-05-21 DIAGNOSIS — N18.31 TYPE 2 DIABETES MELLITUS WITH STAGE 3A CHRONIC KIDNEY DISEASE, WITH LONG-TERM CURRENT USE OF INSULIN: ICD-10-CM

## 2021-05-21 DIAGNOSIS — E66.01 MORBID OBESITY WITH BMI OF 40.0-44.9, ADULT: ICD-10-CM

## 2021-05-21 DIAGNOSIS — M17.11 PRIMARY OSTEOARTHRITIS OF RIGHT KNEE: ICD-10-CM

## 2021-05-21 DIAGNOSIS — F41.9 ANXIETY, MILD: ICD-10-CM

## 2021-05-21 DIAGNOSIS — Z79.4 TYPE 2 DIABETES MELLITUS WITH STAGE 3A CHRONIC KIDNEY DISEASE, WITH LONG-TERM CURRENT USE OF INSULIN: ICD-10-CM

## 2021-05-21 DIAGNOSIS — M81.0 OSTEOPOROSIS WITHOUT CURRENT PATHOLOGICAL FRACTURE, UNSPECIFIED OSTEOPOROSIS TYPE: ICD-10-CM

## 2021-05-21 DIAGNOSIS — Z23 NEED FOR SHINGLES VACCINE: ICD-10-CM

## 2021-05-21 PROCEDURE — 90750 ZOSTER RECOMBINANT VACCINE: ICD-10-PCS | Mod: S$GLB,,, | Performed by: INTERNAL MEDICINE

## 2021-05-21 PROCEDURE — 3078F PR MOST RECENT DIASTOLIC BLOOD PRESSURE < 80 MM HG: ICD-10-PCS | Mod: CPTII,S$GLB,, | Performed by: INTERNAL MEDICINE

## 2021-05-21 PROCEDURE — 1125F AMNT PAIN NOTED PAIN PRSNT: CPT | Mod: S$GLB,,, | Performed by: INTERNAL MEDICINE

## 2021-05-21 PROCEDURE — 3072F LOW RISK FOR RETINOPATHY: CPT | Mod: S$GLB,,, | Performed by: INTERNAL MEDICINE

## 2021-05-21 PROCEDURE — 1101F PT FALLS ASSESS-DOCD LE1/YR: CPT | Mod: CPTII,S$GLB,, | Performed by: INTERNAL MEDICINE

## 2021-05-21 PROCEDURE — 3288F PR FALLS RISK ASSESSMENT DOCUMENTED: ICD-10-PCS | Mod: CPTII,S$GLB,, | Performed by: INTERNAL MEDICINE

## 2021-05-21 PROCEDURE — 90471 IMMUNIZATION ADMIN: CPT | Mod: S$GLB,,, | Performed by: INTERNAL MEDICINE

## 2021-05-21 PROCEDURE — 3008F PR BODY MASS INDEX (BMI) DOCUMENTED: ICD-10-PCS | Mod: CPTII,S$GLB,, | Performed by: INTERNAL MEDICINE

## 2021-05-21 PROCEDURE — 99999 PR PBB SHADOW E&M-EST. PATIENT-LVL IV: ICD-10-PCS | Mod: PBBFAC,,, | Performed by: INTERNAL MEDICINE

## 2021-05-21 PROCEDURE — 90471 ZOSTER RECOMBINANT VACCINE: ICD-10-PCS | Mod: S$GLB,,, | Performed by: INTERNAL MEDICINE

## 2021-05-21 PROCEDURE — 3051F PR MOST RECENT HEMOGLOBIN A1C LEVEL 7.0 - < 8.0%: ICD-10-PCS | Mod: CPTII,S$GLB,, | Performed by: INTERNAL MEDICINE

## 2021-05-21 PROCEDURE — 1125F PR PAIN SEVERITY QUANTIFIED, PAIN PRESENT: ICD-10-PCS | Mod: S$GLB,,, | Performed by: INTERNAL MEDICINE

## 2021-05-21 PROCEDURE — 3051F HG A1C>EQUAL 7.0%<8.0%: CPT | Mod: CPTII,S$GLB,, | Performed by: INTERNAL MEDICINE

## 2021-05-21 PROCEDURE — 99215 PR OFFICE/OUTPT VISIT, EST, LEVL V, 40-54 MIN: ICD-10-PCS | Mod: 25,S$GLB,, | Performed by: INTERNAL MEDICINE

## 2021-05-21 PROCEDURE — 1159F PR MEDICATION LIST DOCUMENTED IN MEDICAL RECORD: ICD-10-PCS | Mod: S$GLB,,, | Performed by: INTERNAL MEDICINE

## 2021-05-21 PROCEDURE — 99999 PR PBB SHADOW E&M-EST. PATIENT-LVL IV: CPT | Mod: PBBFAC,,, | Performed by: INTERNAL MEDICINE

## 2021-05-21 PROCEDURE — 3072F PR LOW RISK FOR RETINOPATHY: ICD-10-PCS | Mod: S$GLB,,, | Performed by: INTERNAL MEDICINE

## 2021-05-21 PROCEDURE — 3288F FALL RISK ASSESSMENT DOCD: CPT | Mod: CPTII,S$GLB,, | Performed by: INTERNAL MEDICINE

## 2021-05-21 PROCEDURE — 3008F BODY MASS INDEX DOCD: CPT | Mod: CPTII,S$GLB,, | Performed by: INTERNAL MEDICINE

## 2021-05-21 PROCEDURE — 99215 OFFICE O/P EST HI 40 MIN: CPT | Mod: 25,S$GLB,, | Performed by: INTERNAL MEDICINE

## 2021-05-21 PROCEDURE — 90750 HZV VACC RECOMBINANT IM: CPT | Mod: S$GLB,,, | Performed by: INTERNAL MEDICINE

## 2021-05-21 PROCEDURE — 3074F SYST BP LT 130 MM HG: CPT | Mod: CPTII,S$GLB,, | Performed by: INTERNAL MEDICINE

## 2021-05-21 PROCEDURE — 3074F PR MOST RECENT SYSTOLIC BLOOD PRESSURE < 130 MM HG: ICD-10-PCS | Mod: CPTII,S$GLB,, | Performed by: INTERNAL MEDICINE

## 2021-05-21 PROCEDURE — 1159F MED LIST DOCD IN RCRD: CPT | Mod: S$GLB,,, | Performed by: INTERNAL MEDICINE

## 2021-05-21 PROCEDURE — 3078F DIAST BP <80 MM HG: CPT | Mod: CPTII,S$GLB,, | Performed by: INTERNAL MEDICINE

## 2021-05-21 PROCEDURE — 1101F PR PT FALLS ASSESS DOC 0-1 FALLS W/OUT INJ PAST YR: ICD-10-PCS | Mod: CPTII,S$GLB,, | Performed by: INTERNAL MEDICINE

## 2021-05-23 RX ORDER — ATORVASTATIN CALCIUM 80 MG/1
80 TABLET, FILM COATED ORAL DAILY
Qty: 90 TABLET | Refills: 1 | Status: SHIPPED | OUTPATIENT
Start: 2021-05-23 | End: 2021-11-28 | Stop reason: SDUPTHER

## 2021-05-23 RX ORDER — IRBESARTAN 300 MG/1
300 TABLET ORAL NIGHTLY
Qty: 90 TABLET | Refills: 1 | Status: SHIPPED | OUTPATIENT
Start: 2021-05-23 | End: 2021-11-28 | Stop reason: SDUPTHER

## 2021-05-23 RX ORDER — CLOPIDOGREL BISULFATE 75 MG/1
75 TABLET ORAL DAILY
Qty: 90 TABLET | Refills: 1 | Status: SHIPPED | OUTPATIENT
Start: 2021-05-23 | End: 2021-10-20

## 2021-05-23 RX ORDER — BUSPIRONE HYDROCHLORIDE 5 MG/1
5 TABLET ORAL 2 TIMES DAILY
Qty: 180 TABLET | Refills: 1 | Status: SHIPPED | OUTPATIENT
Start: 2021-05-23 | End: 2021-11-28 | Stop reason: SDUPTHER

## 2021-05-23 RX ORDER — DONEPEZIL HYDROCHLORIDE 5 MG/1
5 TABLET, FILM COATED ORAL NIGHTLY
Qty: 90 TABLET | Refills: 1 | Status: SHIPPED | OUTPATIENT
Start: 2021-05-23 | End: 2021-10-20

## 2021-05-23 RX ORDER — AMLODIPINE BESYLATE 10 MG/1
10 TABLET ORAL DAILY
Qty: 90 TABLET | Refills: 1 | Status: SHIPPED | OUTPATIENT
Start: 2021-05-23 | End: 2021-09-24

## 2021-05-23 RX ORDER — CARVEDILOL 25 MG/1
25 TABLET ORAL 2 TIMES DAILY WITH MEALS
Qty: 180 TABLET | Refills: 1 | Status: SHIPPED | OUTPATIENT
Start: 2021-05-23 | End: 2021-11-28 | Stop reason: SDUPTHER

## 2021-05-24 ENCOUNTER — HOSPITAL ENCOUNTER (OUTPATIENT)
Dept: RADIOLOGY | Facility: HOSPITAL | Age: 76
Discharge: HOME | End: 2021-05-24
Attending: INTERNAL MEDICINE
Payer: MEDICARE

## 2021-05-24 ENCOUNTER — HOSPITAL ENCOUNTER (OUTPATIENT)
Dept: RADIOLOGY | Facility: CLINIC | Age: 76
Discharge: HOME | End: 2021-05-24
Attending: NURSE PRACTITIONER
Payer: MEDICARE

## 2021-05-24 DIAGNOSIS — M81.0 OSTEOPOROSIS WITHOUT CURRENT PATHOLOGICAL FRACTURE, UNSPECIFIED OSTEOPOROSIS TYPE: Chronic | ICD-10-CM

## 2021-05-24 DIAGNOSIS — Z12.31 ENCOUNTER FOR SCREENING MAMMOGRAM FOR MALIGNANT NEOPLASM OF BREAST: ICD-10-CM

## 2021-05-24 PROCEDURE — 77080 DXA BONE DENSITY AXIAL: CPT | Mod: 26,,, | Performed by: INTERNAL MEDICINE

## 2021-05-24 PROCEDURE — 77080 DEXA BONE DENSITY SPINE HIP: ICD-10-PCS | Mod: 26,,, | Performed by: INTERNAL MEDICINE

## 2021-05-24 PROCEDURE — 77067 MAMMO DIGITAL SCREENING BILAT: ICD-10-PCS | Mod: 26,,, | Performed by: RADIOLOGY

## 2021-05-24 PROCEDURE — 77067 SCR MAMMO BI INCL CAD: CPT | Mod: 26,,, | Performed by: RADIOLOGY

## 2021-05-24 PROCEDURE — 77080 DXA BONE DENSITY AXIAL: CPT | Mod: TC

## 2021-05-24 PROCEDURE — 77067 SCR MAMMO BI INCL CAD: CPT | Mod: TC

## 2021-05-29 ENCOUNTER — PATIENT MESSAGE (OUTPATIENT)
Dept: PRIMARY CARE CLINIC | Facility: CLINIC | Age: 76
End: 2021-05-29

## 2021-05-31 ENCOUNTER — PATIENT MESSAGE (OUTPATIENT)
Dept: ENDOCRINOLOGY | Facility: CLINIC | Age: 76
End: 2021-05-31

## 2021-06-09 ENCOUNTER — OFFICE VISIT (OUTPATIENT)
Dept: SPORTS MEDICINE | Facility: CLINIC | Age: 76
End: 2021-06-09
Payer: MEDICARE

## 2021-06-09 VITALS
BODY MASS INDEX: 43.82 KG/M2 | DIASTOLIC BLOOD PRESSURE: 82 MMHG | WEIGHT: 238.13 LBS | HEIGHT: 62 IN | SYSTOLIC BLOOD PRESSURE: 120 MMHG

## 2021-06-09 DIAGNOSIS — E66.01 CLASS 3 SEVERE OBESITY WITH BODY MASS INDEX (BMI) OF 40.0 TO 44.9 IN ADULT, UNSPECIFIED OBESITY TYPE, UNSPECIFIED WHETHER SERIOUS COMORBIDITY PRESENT: ICD-10-CM

## 2021-06-09 DIAGNOSIS — G89.29 CHRONIC PAIN OF RIGHT KNEE: Primary | ICD-10-CM

## 2021-06-09 DIAGNOSIS — M17.11 PRIMARY OSTEOARTHRITIS OF RIGHT KNEE: ICD-10-CM

## 2021-06-09 DIAGNOSIS — M25.561 CHRONIC PAIN OF RIGHT KNEE: Primary | ICD-10-CM

## 2021-06-09 PROCEDURE — 3008F BODY MASS INDEX DOCD: CPT | Mod: CPTII,S$GLB,, | Performed by: STUDENT IN AN ORGANIZED HEALTH CARE EDUCATION/TRAINING PROGRAM

## 2021-06-09 PROCEDURE — 3072F LOW RISK FOR RETINOPATHY: CPT | Mod: S$GLB,,, | Performed by: STUDENT IN AN ORGANIZED HEALTH CARE EDUCATION/TRAINING PROGRAM

## 2021-06-09 PROCEDURE — 3008F PR BODY MASS INDEX (BMI) DOCUMENTED: ICD-10-PCS | Mod: CPTII,S$GLB,, | Performed by: STUDENT IN AN ORGANIZED HEALTH CARE EDUCATION/TRAINING PROGRAM

## 2021-06-09 PROCEDURE — 3072F PR LOW RISK FOR RETINOPATHY: ICD-10-PCS | Mod: S$GLB,,, | Performed by: STUDENT IN AN ORGANIZED HEALTH CARE EDUCATION/TRAINING PROGRAM

## 2021-06-09 PROCEDURE — 99999 PR PBB SHADOW E&M-EST. PATIENT-LVL IV: CPT | Mod: PBBFAC,,, | Performed by: STUDENT IN AN ORGANIZED HEALTH CARE EDUCATION/TRAINING PROGRAM

## 2021-06-09 PROCEDURE — 1159F MED LIST DOCD IN RCRD: CPT | Mod: S$GLB,,, | Performed by: STUDENT IN AN ORGANIZED HEALTH CARE EDUCATION/TRAINING PROGRAM

## 2021-06-09 PROCEDURE — 1159F PR MEDICATION LIST DOCUMENTED IN MEDICAL RECORD: ICD-10-PCS | Mod: S$GLB,,, | Performed by: STUDENT IN AN ORGANIZED HEALTH CARE EDUCATION/TRAINING PROGRAM

## 2021-06-09 PROCEDURE — 99204 OFFICE O/P NEW MOD 45 MIN: CPT | Mod: S$GLB,,, | Performed by: STUDENT IN AN ORGANIZED HEALTH CARE EDUCATION/TRAINING PROGRAM

## 2021-06-09 PROCEDURE — 99999 PR PBB SHADOW E&M-EST. PATIENT-LVL IV: ICD-10-PCS | Mod: PBBFAC,,, | Performed by: STUDENT IN AN ORGANIZED HEALTH CARE EDUCATION/TRAINING PROGRAM

## 2021-06-09 PROCEDURE — 99204 PR OFFICE/OUTPT VISIT, NEW, LEVL IV, 45-59 MIN: ICD-10-PCS | Mod: S$GLB,,, | Performed by: STUDENT IN AN ORGANIZED HEALTH CARE EDUCATION/TRAINING PROGRAM

## 2021-07-15 ENCOUNTER — CLINICAL SUPPORT (OUTPATIENT)
Dept: REHABILITATION | Facility: HOSPITAL | Age: 76
End: 2021-07-15
Payer: MEDICARE

## 2021-07-15 DIAGNOSIS — R26.89 ANTALGIC GAIT: ICD-10-CM

## 2021-07-15 DIAGNOSIS — M17.11 PRIMARY OSTEOARTHRITIS OF RIGHT KNEE: ICD-10-CM

## 2021-07-15 DIAGNOSIS — G89.29 CHRONIC PAIN OF RIGHT KNEE: ICD-10-CM

## 2021-07-15 DIAGNOSIS — M25.662 DECREASED RANGE OF MOTION OF LEFT KNEE: ICD-10-CM

## 2021-07-15 DIAGNOSIS — M25.561 CHRONIC PAIN OF RIGHT KNEE: ICD-10-CM

## 2021-07-15 PROCEDURE — 97110 THERAPEUTIC EXERCISES: CPT | Mod: PO

## 2021-07-15 PROCEDURE — 97162 PT EVAL MOD COMPLEX 30 MIN: CPT | Mod: PO

## 2021-07-16 ENCOUNTER — TELEPHONE (OUTPATIENT)
Dept: PAIN MEDICINE | Facility: CLINIC | Age: 76
End: 2021-07-16

## 2021-07-16 PROBLEM — G89.29 CHRONIC PAIN OF RIGHT KNEE: Status: ACTIVE | Noted: 2021-07-16

## 2021-07-16 PROBLEM — M25.561 CHRONIC PAIN OF RIGHT KNEE: Status: ACTIVE | Noted: 2021-07-16

## 2021-07-16 PROBLEM — R26.89 ANTALGIC GAIT: Status: ACTIVE | Noted: 2021-07-16

## 2021-07-16 PROBLEM — M25.662 DECREASED RANGE OF MOTION OF LEFT KNEE: Status: ACTIVE | Noted: 2021-07-16

## 2021-07-17 ENCOUNTER — PATIENT OUTREACH (OUTPATIENT)
Dept: ADMINISTRATIVE | Facility: OTHER | Age: 76
End: 2021-07-17

## 2021-07-17 DIAGNOSIS — Z12.12 ENCOUNTER FOR COLORECTAL CANCER SCREENING: Primary | ICD-10-CM

## 2021-07-17 DIAGNOSIS — Z12.11 ENCOUNTER FOR COLORECTAL CANCER SCREENING: Primary | ICD-10-CM

## 2021-07-19 ENCOUNTER — OFFICE VISIT (OUTPATIENT)
Dept: PAIN MEDICINE | Facility: CLINIC | Age: 76
End: 2021-07-19
Payer: MEDICARE

## 2021-07-19 VITALS
DIASTOLIC BLOOD PRESSURE: 71 MMHG | RESPIRATION RATE: 18 BRPM | HEART RATE: 81 BPM | WEIGHT: 239 LBS | HEIGHT: 62 IN | BODY MASS INDEX: 43.98 KG/M2 | SYSTOLIC BLOOD PRESSURE: 115 MMHG

## 2021-07-19 DIAGNOSIS — Z96.652 HISTORY OF LEFT KNEE REPLACEMENT: ICD-10-CM

## 2021-07-19 DIAGNOSIS — M17.12 PRIMARY OSTEOARTHRITIS OF LEFT KNEE: Primary | ICD-10-CM

## 2021-07-19 DIAGNOSIS — G89.4 CHRONIC PAIN DISORDER: ICD-10-CM

## 2021-07-19 DIAGNOSIS — M17.11 PRIMARY OSTEOARTHRITIS OF RIGHT KNEE: ICD-10-CM

## 2021-07-19 PROCEDURE — 1159F MED LIST DOCD IN RCRD: CPT | Mod: CPTII,S$GLB,, | Performed by: NURSE PRACTITIONER

## 2021-07-19 PROCEDURE — 1125F PR PAIN SEVERITY QUANTIFIED, PAIN PRESENT: ICD-10-PCS | Mod: CPTII,S$GLB,, | Performed by: NURSE PRACTITIONER

## 2021-07-19 PROCEDURE — 3072F LOW RISK FOR RETINOPATHY: CPT | Mod: CPTII,S$GLB,, | Performed by: NURSE PRACTITIONER

## 2021-07-19 PROCEDURE — 3008F PR BODY MASS INDEX (BMI) DOCUMENTED: ICD-10-PCS | Mod: CPTII,S$GLB,, | Performed by: NURSE PRACTITIONER

## 2021-07-19 PROCEDURE — 1159F PR MEDICATION LIST DOCUMENTED IN MEDICAL RECORD: ICD-10-PCS | Mod: CPTII,S$GLB,, | Performed by: NURSE PRACTITIONER

## 2021-07-19 PROCEDURE — 1101F PR PT FALLS ASSESS DOC 0-1 FALLS W/OUT INJ PAST YR: ICD-10-PCS | Mod: CPTII,S$GLB,, | Performed by: NURSE PRACTITIONER

## 2021-07-19 PROCEDURE — 99213 PR OFFICE/OUTPT VISIT, EST, LEVL III, 20-29 MIN: ICD-10-PCS | Mod: S$GLB,,, | Performed by: NURSE PRACTITIONER

## 2021-07-19 PROCEDURE — 99213 OFFICE O/P EST LOW 20 MIN: CPT | Mod: S$GLB,,, | Performed by: NURSE PRACTITIONER

## 2021-07-19 PROCEDURE — 3008F BODY MASS INDEX DOCD: CPT | Mod: CPTII,S$GLB,, | Performed by: NURSE PRACTITIONER

## 2021-07-19 PROCEDURE — 1101F PT FALLS ASSESS-DOCD LE1/YR: CPT | Mod: CPTII,S$GLB,, | Performed by: NURSE PRACTITIONER

## 2021-07-19 PROCEDURE — 99999 PR PBB SHADOW E&M-EST. PATIENT-LVL IV: ICD-10-PCS | Mod: PBBFAC,,, | Performed by: NURSE PRACTITIONER

## 2021-07-19 PROCEDURE — 1125F AMNT PAIN NOTED PAIN PRSNT: CPT | Mod: CPTII,S$GLB,, | Performed by: NURSE PRACTITIONER

## 2021-07-19 PROCEDURE — 3072F PR LOW RISK FOR RETINOPATHY: ICD-10-PCS | Mod: CPTII,S$GLB,, | Performed by: NURSE PRACTITIONER

## 2021-07-19 PROCEDURE — 3288F FALL RISK ASSESSMENT DOCD: CPT | Mod: CPTII,S$GLB,, | Performed by: NURSE PRACTITIONER

## 2021-07-19 PROCEDURE — 99999 PR PBB SHADOW E&M-EST. PATIENT-LVL IV: CPT | Mod: PBBFAC,,, | Performed by: NURSE PRACTITIONER

## 2021-07-19 PROCEDURE — 3288F PR FALLS RISK ASSESSMENT DOCUMENTED: ICD-10-PCS | Mod: CPTII,S$GLB,, | Performed by: NURSE PRACTITIONER

## 2021-07-20 ENCOUNTER — TELEPHONE (OUTPATIENT)
Dept: SPORTS MEDICINE | Facility: CLINIC | Age: 76
End: 2021-07-20

## 2021-07-26 ENCOUNTER — CLINICAL SUPPORT (OUTPATIENT)
Dept: REHABILITATION | Facility: HOSPITAL | Age: 76
End: 2021-07-26
Payer: MEDICARE

## 2021-07-26 DIAGNOSIS — M25.662 DECREASED RANGE OF MOTION OF LEFT KNEE: ICD-10-CM

## 2021-07-26 DIAGNOSIS — M25.561 CHRONIC PAIN OF RIGHT KNEE: ICD-10-CM

## 2021-07-26 DIAGNOSIS — G89.29 CHRONIC PAIN OF RIGHT KNEE: ICD-10-CM

## 2021-07-26 DIAGNOSIS — R26.89 ANTALGIC GAIT: ICD-10-CM

## 2021-07-26 PROCEDURE — 97110 THERAPEUTIC EXERCISES: CPT | Mod: PO

## 2021-07-28 ENCOUNTER — TELEPHONE (OUTPATIENT)
Dept: SPORTS MEDICINE | Facility: CLINIC | Age: 76
End: 2021-07-28

## 2021-07-29 ENCOUNTER — CLINICAL SUPPORT (OUTPATIENT)
Dept: REHABILITATION | Facility: HOSPITAL | Age: 76
End: 2021-07-29
Payer: MEDICARE

## 2021-07-29 DIAGNOSIS — R26.89 ANTALGIC GAIT: ICD-10-CM

## 2021-07-29 DIAGNOSIS — M25.561 CHRONIC PAIN OF RIGHT KNEE: ICD-10-CM

## 2021-07-29 DIAGNOSIS — M25.662 DECREASED RANGE OF MOTION OF LEFT KNEE: ICD-10-CM

## 2021-07-29 DIAGNOSIS — G89.29 CHRONIC PAIN OF RIGHT KNEE: ICD-10-CM

## 2021-07-29 PROCEDURE — 97110 THERAPEUTIC EXERCISES: CPT | Mod: PO

## 2021-08-02 ENCOUNTER — CLINICAL SUPPORT (OUTPATIENT)
Dept: REHABILITATION | Facility: HOSPITAL | Age: 76
End: 2021-08-02
Payer: MEDICARE

## 2021-08-02 DIAGNOSIS — R26.89 ANTALGIC GAIT: ICD-10-CM

## 2021-08-02 DIAGNOSIS — M25.561 CHRONIC PAIN OF RIGHT KNEE: ICD-10-CM

## 2021-08-02 DIAGNOSIS — G89.29 CHRONIC PAIN OF RIGHT KNEE: ICD-10-CM

## 2021-08-02 DIAGNOSIS — M25.662 DECREASED RANGE OF MOTION OF LEFT KNEE: ICD-10-CM

## 2021-08-02 PROCEDURE — 97110 THERAPEUTIC EXERCISES: CPT | Mod: PO

## 2021-08-02 PROCEDURE — 97140 MANUAL THERAPY 1/> REGIONS: CPT | Mod: PO

## 2021-08-03 ENCOUNTER — PATIENT MESSAGE (OUTPATIENT)
Dept: ADMINISTRATIVE | Facility: HOSPITAL | Age: 76
End: 2021-08-03

## 2021-08-05 ENCOUNTER — CLINICAL SUPPORT (OUTPATIENT)
Dept: REHABILITATION | Facility: HOSPITAL | Age: 76
End: 2021-08-05
Payer: MEDICARE

## 2021-08-05 DIAGNOSIS — M25.662 DECREASED RANGE OF MOTION OF LEFT KNEE: ICD-10-CM

## 2021-08-05 DIAGNOSIS — R26.89 ANTALGIC GAIT: ICD-10-CM

## 2021-08-05 DIAGNOSIS — M25.561 CHRONIC PAIN OF RIGHT KNEE: ICD-10-CM

## 2021-08-05 DIAGNOSIS — G89.29 CHRONIC PAIN OF RIGHT KNEE: ICD-10-CM

## 2021-08-05 PROCEDURE — 97140 MANUAL THERAPY 1/> REGIONS: CPT | Mod: PO

## 2021-08-05 PROCEDURE — 97110 THERAPEUTIC EXERCISES: CPT | Mod: PO

## 2021-08-09 ENCOUNTER — OFFICE VISIT (OUTPATIENT)
Dept: PODIATRY | Facility: CLINIC | Age: 76
End: 2021-08-09
Payer: MEDICARE

## 2021-08-09 ENCOUNTER — PATIENT MESSAGE (OUTPATIENT)
Dept: PRIMARY CARE CLINIC | Facility: CLINIC | Age: 76
End: 2021-08-09

## 2021-08-09 VITALS
HEART RATE: 89 BPM | WEIGHT: 238 LBS | BODY MASS INDEX: 43.79 KG/M2 | SYSTOLIC BLOOD PRESSURE: 130 MMHG | HEIGHT: 62 IN | DIASTOLIC BLOOD PRESSURE: 67 MMHG

## 2021-08-09 DIAGNOSIS — L84 CORN OR CALLUS: Primary | ICD-10-CM

## 2021-08-09 DIAGNOSIS — D64.9 NORMOCYTIC ANEMIA: ICD-10-CM

## 2021-08-09 DIAGNOSIS — E11.49 TYPE II DIABETES MELLITUS WITH NEUROLOGICAL MANIFESTATIONS: ICD-10-CM

## 2021-08-09 DIAGNOSIS — I11.9 HYPERTENSIVE HEART DISEASE WITHOUT HEART FAILURE: ICD-10-CM

## 2021-08-09 DIAGNOSIS — B35.1 DERMATOPHYTOSIS OF NAIL: ICD-10-CM

## 2021-08-09 DIAGNOSIS — I25.118 CORONARY ARTERY DISEASE OF NATIVE ARTERY OF NATIVE HEART WITH STABLE ANGINA PECTORIS: ICD-10-CM

## 2021-08-09 DIAGNOSIS — F41.9 ANXIETY, MILD: ICD-10-CM

## 2021-08-09 DIAGNOSIS — M81.0 OSTEOPOROSIS WITHOUT CURRENT PATHOLOGICAL FRACTURE, UNSPECIFIED OSTEOPOROSIS TYPE: ICD-10-CM

## 2021-08-09 DIAGNOSIS — G31.84 MILD COGNITIVE IMPAIRMENT: ICD-10-CM

## 2021-08-09 PROCEDURE — 11721 ROUTINE FOOT CARE: ICD-10-PCS | Mod: Q9,59,S$GLB, | Performed by: PODIATRIST

## 2021-08-09 PROCEDURE — 99499 UNLISTED E&M SERVICE: CPT | Mod: S$GLB,,, | Performed by: PODIATRIST

## 2021-08-09 PROCEDURE — 1101F PR PT FALLS ASSESS DOC 0-1 FALLS W/OUT INJ PAST YR: ICD-10-PCS | Mod: CPTII,S$GLB,, | Performed by: PODIATRIST

## 2021-08-09 PROCEDURE — 3078F DIAST BP <80 MM HG: CPT | Mod: CPTII,S$GLB,, | Performed by: PODIATRIST

## 2021-08-09 PROCEDURE — 99499 NO LOS: ICD-10-PCS | Mod: S$GLB,,, | Performed by: PODIATRIST

## 2021-08-09 PROCEDURE — 1160F RVW MEDS BY RX/DR IN RCRD: CPT | Mod: CPTII,S$GLB,, | Performed by: PODIATRIST

## 2021-08-09 PROCEDURE — 1126F PR PAIN SEVERITY QUANTIFIED, NO PAIN PRESENT: ICD-10-PCS | Mod: CPTII,S$GLB,, | Performed by: PODIATRIST

## 2021-08-09 PROCEDURE — 3008F BODY MASS INDEX DOCD: CPT | Mod: CPTII,S$GLB,, | Performed by: PODIATRIST

## 2021-08-09 PROCEDURE — 1160F PR REVIEW ALL MEDS BY PRESCRIBER/CLIN PHARMACIST DOCUMENTED: ICD-10-PCS | Mod: CPTII,S$GLB,, | Performed by: PODIATRIST

## 2021-08-09 PROCEDURE — 3288F FALL RISK ASSESSMENT DOCD: CPT | Mod: CPTII,S$GLB,, | Performed by: PODIATRIST

## 2021-08-09 PROCEDURE — 3051F HG A1C>EQUAL 7.0%<8.0%: CPT | Mod: CPTII,S$GLB,, | Performed by: PODIATRIST

## 2021-08-09 PROCEDURE — 1159F PR MEDICATION LIST DOCUMENTED IN MEDICAL RECORD: ICD-10-PCS | Mod: CPTII,S$GLB,, | Performed by: PODIATRIST

## 2021-08-09 PROCEDURE — 3072F PR LOW RISK FOR RETINOPATHY: ICD-10-PCS | Mod: CPTII,S$GLB,, | Performed by: PODIATRIST

## 2021-08-09 PROCEDURE — 3008F PR BODY MASS INDEX (BMI) DOCUMENTED: ICD-10-PCS | Mod: CPTII,S$GLB,, | Performed by: PODIATRIST

## 2021-08-09 PROCEDURE — 3072F LOW RISK FOR RETINOPATHY: CPT | Mod: CPTII,S$GLB,, | Performed by: PODIATRIST

## 2021-08-09 PROCEDURE — 3075F PR MOST RECENT SYSTOLIC BLOOD PRESS GE 130-139MM HG: ICD-10-PCS | Mod: CPTII,S$GLB,, | Performed by: PODIATRIST

## 2021-08-09 PROCEDURE — 11721 DEBRIDE NAIL 6 OR MORE: CPT | Mod: Q9,59,S$GLB, | Performed by: PODIATRIST

## 2021-08-09 PROCEDURE — 1101F PT FALLS ASSESS-DOCD LE1/YR: CPT | Mod: CPTII,S$GLB,, | Performed by: PODIATRIST

## 2021-08-09 PROCEDURE — 11056 ROUTINE FOOT CARE: ICD-10-PCS | Mod: Q9,S$GLB,, | Performed by: PODIATRIST

## 2021-08-09 PROCEDURE — 3288F PR FALLS RISK ASSESSMENT DOCUMENTED: ICD-10-PCS | Mod: CPTII,S$GLB,, | Performed by: PODIATRIST

## 2021-08-09 PROCEDURE — 11056 PARNG/CUTG B9 HYPRKR LES 2-4: CPT | Mod: Q9,S$GLB,, | Performed by: PODIATRIST

## 2021-08-09 PROCEDURE — 3075F SYST BP GE 130 - 139MM HG: CPT | Mod: CPTII,S$GLB,, | Performed by: PODIATRIST

## 2021-08-09 PROCEDURE — 3051F PR MOST RECENT HEMOGLOBIN A1C LEVEL 7.0 - < 8.0%: ICD-10-PCS | Mod: CPTII,S$GLB,, | Performed by: PODIATRIST

## 2021-08-09 PROCEDURE — 1126F AMNT PAIN NOTED NONE PRSNT: CPT | Mod: CPTII,S$GLB,, | Performed by: PODIATRIST

## 2021-08-09 PROCEDURE — 1159F MED LIST DOCD IN RCRD: CPT | Mod: CPTII,S$GLB,, | Performed by: PODIATRIST

## 2021-08-09 PROCEDURE — 99999 PR PBB SHADOW E&M-EST. PATIENT-LVL IV: ICD-10-PCS | Mod: PBBFAC,,, | Performed by: PODIATRIST

## 2021-08-09 PROCEDURE — 3078F PR MOST RECENT DIASTOLIC BLOOD PRESSURE < 80 MM HG: ICD-10-PCS | Mod: CPTII,S$GLB,, | Performed by: PODIATRIST

## 2021-08-09 PROCEDURE — 99999 PR PBB SHADOW E&M-EST. PATIENT-LVL IV: CPT | Mod: PBBFAC,,, | Performed by: PODIATRIST

## 2021-08-09 RX ORDER — AMLODIPINE BESYLATE 10 MG/1
10 TABLET ORAL DAILY
Qty: 90 TABLET | Refills: 1 | Status: CANCELLED | OUTPATIENT
Start: 2021-08-09

## 2021-08-09 RX ORDER — PEN NEEDLE, DIABETIC 32GX 5/32"
NEEDLE, DISPOSABLE MISCELLANEOUS
COMMUNITY
Start: 2021-06-23 | End: 2021-12-27

## 2021-08-09 RX ORDER — DONEPEZIL HYDROCHLORIDE 5 MG/1
5 TABLET, FILM COATED ORAL NIGHTLY
Qty: 90 TABLET | Refills: 1 | Status: CANCELLED | OUTPATIENT
Start: 2021-08-09

## 2021-08-09 RX ORDER — CLOPIDOGREL BISULFATE 75 MG/1
75 TABLET ORAL DAILY
Qty: 90 TABLET | Refills: 1 | Status: CANCELLED | OUTPATIENT
Start: 2021-08-09

## 2021-08-09 RX ORDER — ATORVASTATIN CALCIUM 80 MG/1
80 TABLET, FILM COATED ORAL DAILY
Qty: 90 TABLET | Refills: 1 | Status: CANCELLED | OUTPATIENT
Start: 2021-08-09

## 2021-08-09 RX ORDER — BUSPIRONE HYDROCHLORIDE 5 MG/1
5 TABLET ORAL 2 TIMES DAILY
Qty: 180 TABLET | Refills: 1 | Status: CANCELLED | OUTPATIENT
Start: 2021-08-09

## 2021-08-09 RX ORDER — CARVEDILOL 25 MG/1
25 TABLET ORAL 2 TIMES DAILY WITH MEALS
Qty: 180 TABLET | Refills: 1 | Status: CANCELLED | OUTPATIENT
Start: 2021-08-09

## 2021-08-09 RX ORDER — IRBESARTAN 300 MG/1
300 TABLET ORAL NIGHTLY
Qty: 90 TABLET | Refills: 1 | Status: CANCELLED | OUTPATIENT
Start: 2021-08-09

## 2021-08-10 ENCOUNTER — CLINICAL SUPPORT (OUTPATIENT)
Dept: REHABILITATION | Facility: HOSPITAL | Age: 76
End: 2021-08-10
Payer: MEDICARE

## 2021-08-10 DIAGNOSIS — G89.29 CHRONIC PAIN OF RIGHT KNEE: ICD-10-CM

## 2021-08-10 DIAGNOSIS — R26.89 ANTALGIC GAIT: ICD-10-CM

## 2021-08-10 DIAGNOSIS — M25.662 DECREASED RANGE OF MOTION OF LEFT KNEE: ICD-10-CM

## 2021-08-10 DIAGNOSIS — M25.561 CHRONIC PAIN OF RIGHT KNEE: ICD-10-CM

## 2021-08-10 PROCEDURE — 97140 MANUAL THERAPY 1/> REGIONS: CPT | Mod: PO

## 2021-08-10 PROCEDURE — 97110 THERAPEUTIC EXERCISES: CPT | Mod: PO

## 2021-08-10 RX ORDER — ALENDRONATE SODIUM 70 MG/1
70 TABLET ORAL
Qty: 12 TABLET | Refills: 1 | Status: SHIPPED | OUTPATIENT
Start: 2021-08-10 | End: 2021-11-28 | Stop reason: SDUPTHER

## 2021-08-10 RX ORDER — FERROUS SULFATE 325(65) MG
1 TABLET ORAL DAILY
Qty: 90 TABLET | Refills: 1 | Status: SHIPPED | OUTPATIENT
Start: 2021-08-10 | End: 2022-02-14

## 2021-08-12 ENCOUNTER — CLINICAL SUPPORT (OUTPATIENT)
Dept: REHABILITATION | Facility: HOSPITAL | Age: 76
End: 2021-08-12
Payer: MEDICARE

## 2021-08-12 DIAGNOSIS — R26.89 ANTALGIC GAIT: ICD-10-CM

## 2021-08-12 DIAGNOSIS — G89.29 CHRONIC PAIN OF RIGHT KNEE: ICD-10-CM

## 2021-08-12 DIAGNOSIS — M25.561 CHRONIC PAIN OF RIGHT KNEE: ICD-10-CM

## 2021-08-12 DIAGNOSIS — M25.662 DECREASED RANGE OF MOTION OF LEFT KNEE: ICD-10-CM

## 2021-08-12 PROCEDURE — 97110 THERAPEUTIC EXERCISES: CPT | Mod: PO

## 2021-08-16 ENCOUNTER — CLINICAL SUPPORT (OUTPATIENT)
Dept: REHABILITATION | Facility: HOSPITAL | Age: 76
End: 2021-08-16
Payer: MEDICARE

## 2021-08-16 DIAGNOSIS — R26.89 ANTALGIC GAIT: ICD-10-CM

## 2021-08-16 DIAGNOSIS — M25.561 CHRONIC PAIN OF RIGHT KNEE: ICD-10-CM

## 2021-08-16 DIAGNOSIS — G89.29 CHRONIC PAIN OF RIGHT KNEE: ICD-10-CM

## 2021-08-16 DIAGNOSIS — M25.662 DECREASED RANGE OF MOTION OF LEFT KNEE: ICD-10-CM

## 2021-08-16 PROCEDURE — 97110 THERAPEUTIC EXERCISES: CPT | Mod: PO

## 2021-08-18 ENCOUNTER — OFFICE VISIT (OUTPATIENT)
Dept: SPORTS MEDICINE | Facility: CLINIC | Age: 76
End: 2021-08-18
Payer: MEDICARE

## 2021-08-18 VITALS
SYSTOLIC BLOOD PRESSURE: 126 MMHG | BODY MASS INDEX: 44.26 KG/M2 | DIASTOLIC BLOOD PRESSURE: 74 MMHG | WEIGHT: 240.5 LBS | HEIGHT: 62 IN

## 2021-08-18 DIAGNOSIS — M17.11 PRIMARY OSTEOARTHRITIS OF RIGHT KNEE: ICD-10-CM

## 2021-08-18 DIAGNOSIS — G89.29 CHRONIC PAIN OF RIGHT KNEE: Primary | ICD-10-CM

## 2021-08-18 DIAGNOSIS — R07.9 CHEST PAIN, UNSPECIFIED TYPE: ICD-10-CM

## 2021-08-18 DIAGNOSIS — E66.01 CLASS 3 SEVERE OBESITY WITH BODY MASS INDEX (BMI) OF 40.0 TO 44.9 IN ADULT, UNSPECIFIED OBESITY TYPE, UNSPECIFIED WHETHER SERIOUS COMORBIDITY PRESENT: ICD-10-CM

## 2021-08-18 DIAGNOSIS — M25.561 CHRONIC PAIN OF RIGHT KNEE: Primary | ICD-10-CM

## 2021-08-18 DIAGNOSIS — Z71.9 COUNSELING, UNSPECIFIED: ICD-10-CM

## 2021-08-18 PROCEDURE — 99499 UNLISTED E&M SERVICE: CPT | Mod: S$GLB,,, | Performed by: STUDENT IN AN ORGANIZED HEALTH CARE EDUCATION/TRAINING PROGRAM

## 2021-08-18 PROCEDURE — 3074F PR MOST RECENT SYSTOLIC BLOOD PRESSURE < 130 MM HG: ICD-10-PCS | Mod: CPTII,S$GLB,, | Performed by: STUDENT IN AN ORGANIZED HEALTH CARE EDUCATION/TRAINING PROGRAM

## 2021-08-18 PROCEDURE — 3074F SYST BP LT 130 MM HG: CPT | Mod: CPTII,S$GLB,, | Performed by: STUDENT IN AN ORGANIZED HEALTH CARE EDUCATION/TRAINING PROGRAM

## 2021-08-18 PROCEDURE — 1125F PR PAIN SEVERITY QUANTIFIED, PAIN PRESENT: ICD-10-PCS | Mod: CPTII,S$GLB,, | Performed by: STUDENT IN AN ORGANIZED HEALTH CARE EDUCATION/TRAINING PROGRAM

## 2021-08-18 PROCEDURE — 3078F DIAST BP <80 MM HG: CPT | Mod: CPTII,S$GLB,, | Performed by: STUDENT IN AN ORGANIZED HEALTH CARE EDUCATION/TRAINING PROGRAM

## 2021-08-18 PROCEDURE — 99215 PR OFFICE/OUTPT VISIT, EST, LEVL V, 40-54 MIN: ICD-10-PCS | Mod: S$GLB,,, | Performed by: STUDENT IN AN ORGANIZED HEALTH CARE EDUCATION/TRAINING PROGRAM

## 2021-08-18 PROCEDURE — 1159F PR MEDICATION LIST DOCUMENTED IN MEDICAL RECORD: ICD-10-PCS | Mod: CPTII,S$GLB,, | Performed by: STUDENT IN AN ORGANIZED HEALTH CARE EDUCATION/TRAINING PROGRAM

## 2021-08-18 PROCEDURE — 3008F PR BODY MASS INDEX (BMI) DOCUMENTED: ICD-10-PCS | Mod: CPTII,S$GLB,, | Performed by: STUDENT IN AN ORGANIZED HEALTH CARE EDUCATION/TRAINING PROGRAM

## 2021-08-18 PROCEDURE — 3051F PR MOST RECENT HEMOGLOBIN A1C LEVEL 7.0 - < 8.0%: ICD-10-PCS | Mod: CPTII,S$GLB,, | Performed by: STUDENT IN AN ORGANIZED HEALTH CARE EDUCATION/TRAINING PROGRAM

## 2021-08-18 PROCEDURE — 99215 OFFICE O/P EST HI 40 MIN: CPT | Mod: S$GLB,,, | Performed by: STUDENT IN AN ORGANIZED HEALTH CARE EDUCATION/TRAINING PROGRAM

## 2021-08-18 PROCEDURE — 1160F RVW MEDS BY RX/DR IN RCRD: CPT | Mod: CPTII,S$GLB,, | Performed by: STUDENT IN AN ORGANIZED HEALTH CARE EDUCATION/TRAINING PROGRAM

## 2021-08-18 PROCEDURE — 1159F MED LIST DOCD IN RCRD: CPT | Mod: CPTII,S$GLB,, | Performed by: STUDENT IN AN ORGANIZED HEALTH CARE EDUCATION/TRAINING PROGRAM

## 2021-08-18 PROCEDURE — 3072F LOW RISK FOR RETINOPATHY: CPT | Mod: CPTII,S$GLB,, | Performed by: STUDENT IN AN ORGANIZED HEALTH CARE EDUCATION/TRAINING PROGRAM

## 2021-08-18 PROCEDURE — 99999 PR PBB SHADOW E&M-EST. PATIENT-LVL III: ICD-10-PCS | Mod: PBBFAC,,, | Performed by: STUDENT IN AN ORGANIZED HEALTH CARE EDUCATION/TRAINING PROGRAM

## 2021-08-18 PROCEDURE — 1160F PR REVIEW ALL MEDS BY PRESCRIBER/CLIN PHARMACIST DOCUMENTED: ICD-10-PCS | Mod: CPTII,S$GLB,, | Performed by: STUDENT IN AN ORGANIZED HEALTH CARE EDUCATION/TRAINING PROGRAM

## 2021-08-18 PROCEDURE — 3072F PR LOW RISK FOR RETINOPATHY: ICD-10-PCS | Mod: CPTII,S$GLB,, | Performed by: STUDENT IN AN ORGANIZED HEALTH CARE EDUCATION/TRAINING PROGRAM

## 2021-08-18 PROCEDURE — 99999 PR PBB SHADOW E&M-EST. PATIENT-LVL III: CPT | Mod: PBBFAC,,, | Performed by: STUDENT IN AN ORGANIZED HEALTH CARE EDUCATION/TRAINING PROGRAM

## 2021-08-18 PROCEDURE — 3008F BODY MASS INDEX DOCD: CPT | Mod: CPTII,S$GLB,, | Performed by: STUDENT IN AN ORGANIZED HEALTH CARE EDUCATION/TRAINING PROGRAM

## 2021-08-18 PROCEDURE — 99499 RISK ADDL DX/OHS AUDIT: ICD-10-PCS | Mod: S$GLB,,, | Performed by: STUDENT IN AN ORGANIZED HEALTH CARE EDUCATION/TRAINING PROGRAM

## 2021-08-18 PROCEDURE — 1125F AMNT PAIN NOTED PAIN PRSNT: CPT | Mod: CPTII,S$GLB,, | Performed by: STUDENT IN AN ORGANIZED HEALTH CARE EDUCATION/TRAINING PROGRAM

## 2021-08-18 PROCEDURE — 3051F HG A1C>EQUAL 7.0%<8.0%: CPT | Mod: CPTII,S$GLB,, | Performed by: STUDENT IN AN ORGANIZED HEALTH CARE EDUCATION/TRAINING PROGRAM

## 2021-08-18 PROCEDURE — 3078F PR MOST RECENT DIASTOLIC BLOOD PRESSURE < 80 MM HG: ICD-10-PCS | Mod: CPTII,S$GLB,, | Performed by: STUDENT IN AN ORGANIZED HEALTH CARE EDUCATION/TRAINING PROGRAM

## 2021-08-19 ENCOUNTER — CLINICAL SUPPORT (OUTPATIENT)
Dept: REHABILITATION | Facility: HOSPITAL | Age: 76
End: 2021-08-19
Payer: MEDICARE

## 2021-08-19 DIAGNOSIS — G89.29 CHRONIC PAIN OF RIGHT KNEE: ICD-10-CM

## 2021-08-19 DIAGNOSIS — M25.662 DECREASED RANGE OF MOTION OF LEFT KNEE: ICD-10-CM

## 2021-08-19 DIAGNOSIS — M25.561 CHRONIC PAIN OF RIGHT KNEE: ICD-10-CM

## 2021-08-19 DIAGNOSIS — R26.89 ANTALGIC GAIT: ICD-10-CM

## 2021-08-19 PROCEDURE — 97140 MANUAL THERAPY 1/> REGIONS: CPT | Mod: PO

## 2021-08-19 PROCEDURE — 97110 THERAPEUTIC EXERCISES: CPT | Mod: PO

## 2021-08-20 ENCOUNTER — TELEPHONE (OUTPATIENT)
Dept: PRIMARY CARE CLINIC | Facility: CLINIC | Age: 76
End: 2021-08-20

## 2021-08-23 ENCOUNTER — CLINICAL SUPPORT (OUTPATIENT)
Dept: REHABILITATION | Facility: HOSPITAL | Age: 76
End: 2021-08-23
Payer: MEDICARE

## 2021-08-23 DIAGNOSIS — G89.29 CHRONIC PAIN OF RIGHT KNEE: ICD-10-CM

## 2021-08-23 DIAGNOSIS — R26.89 ANTALGIC GAIT: ICD-10-CM

## 2021-08-23 DIAGNOSIS — R07.9 CHEST PAIN, UNSPECIFIED TYPE: Primary | ICD-10-CM

## 2021-08-23 DIAGNOSIS — M25.561 CHRONIC PAIN OF RIGHT KNEE: ICD-10-CM

## 2021-08-23 DIAGNOSIS — M25.662 DECREASED RANGE OF MOTION OF LEFT KNEE: ICD-10-CM

## 2021-08-23 PROCEDURE — 97110 THERAPEUTIC EXERCISES: CPT | Mod: PO

## 2021-08-23 PROCEDURE — 97140 MANUAL THERAPY 1/> REGIONS: CPT | Mod: PO

## 2021-08-24 ENCOUNTER — PATIENT OUTREACH (OUTPATIENT)
Dept: ADMINISTRATIVE | Facility: OTHER | Age: 76
End: 2021-08-24

## 2021-08-24 ENCOUNTER — HOSPITAL ENCOUNTER (OUTPATIENT)
Dept: CARDIOLOGY | Facility: CLINIC | Age: 76
Discharge: HOME | End: 2021-08-24
Payer: MEDICARE

## 2021-08-24 ENCOUNTER — HOSPITAL ENCOUNTER (OUTPATIENT)
Dept: RADIOLOGY | Facility: HOSPITAL | Age: 76
Discharge: HOME | End: 2021-08-24
Attending: INTERNAL MEDICINE
Payer: MEDICARE

## 2021-08-24 ENCOUNTER — OFFICE VISIT (OUTPATIENT)
Dept: CARDIOLOGY | Facility: CLINIC | Age: 76
End: 2021-08-24
Payer: MEDICARE

## 2021-08-24 VITALS
HEART RATE: 92 BPM | HEIGHT: 62 IN | DIASTOLIC BLOOD PRESSURE: 59 MMHG | BODY MASS INDEX: 44.22 KG/M2 | SYSTOLIC BLOOD PRESSURE: 112 MMHG | WEIGHT: 240.31 LBS

## 2021-08-24 DIAGNOSIS — I25.810 CORONARY ARTERY DISEASE INVOLVING CORONARY BYPASS GRAFT OF NATIVE HEART WITHOUT ANGINA PECTORIS: ICD-10-CM

## 2021-08-24 DIAGNOSIS — N18.31 STAGE 3A CHRONIC KIDNEY DISEASE: ICD-10-CM

## 2021-08-24 DIAGNOSIS — E11.22 TYPE 2 DIABETES MELLITUS WITH STAGE 3 CHRONIC KIDNEY DISEASE AND HYPERTENSION: Chronic | ICD-10-CM

## 2021-08-24 DIAGNOSIS — R07.9 CHEST PAIN, UNSPECIFIED TYPE: ICD-10-CM

## 2021-08-24 DIAGNOSIS — G47.33 OSA ON CPAP: ICD-10-CM

## 2021-08-24 DIAGNOSIS — I25.810 CORONARY ARTERY DISEASE INVOLVING CORONARY BYPASS GRAFT OF NATIVE HEART WITHOUT ANGINA PECTORIS: Primary | ICD-10-CM

## 2021-08-24 DIAGNOSIS — I12.9 TYPE 2 DIABETES MELLITUS WITH STAGE 3 CHRONIC KIDNEY DISEASE AND HYPERTENSION: Chronic | ICD-10-CM

## 2021-08-24 DIAGNOSIS — Z95.1 S/P CABG (CORONARY ARTERY BYPASS GRAFT): ICD-10-CM

## 2021-08-24 DIAGNOSIS — E78.5 DYSLIPIDEMIA, GOAL LDL BELOW 70: ICD-10-CM

## 2021-08-24 DIAGNOSIS — E66.01 MORBID OBESITY WITH BODY MASS INDEX (BMI) OF 40.0 OR HIGHER: ICD-10-CM

## 2021-08-24 DIAGNOSIS — Z95.5 S/P DRUG ELUTING CORONARY STENT PLACEMENT: ICD-10-CM

## 2021-08-24 DIAGNOSIS — Z86.73 HISTORY OF TIA (TRANSIENT ISCHEMIC ATTACK): ICD-10-CM

## 2021-08-24 DIAGNOSIS — N18.30 TYPE 2 DIABETES MELLITUS WITH STAGE 3 CHRONIC KIDNEY DISEASE AND HYPERTENSION: Chronic | ICD-10-CM

## 2021-08-24 DIAGNOSIS — I10 ESSENTIAL HYPERTENSION: Chronic | ICD-10-CM

## 2021-08-24 PROCEDURE — 3008F PR BODY MASS INDEX (BMI) DOCUMENTED: ICD-10-PCS | Mod: CPTII,S$GLB,, | Performed by: INTERNAL MEDICINE

## 2021-08-24 PROCEDURE — 3072F LOW RISK FOR RETINOPATHY: CPT | Mod: CPTII,S$GLB,, | Performed by: INTERNAL MEDICINE

## 2021-08-24 PROCEDURE — 99999 PR PBB SHADOW E&M-EST. PATIENT-LVL V: CPT | Mod: PBBFAC,,, | Performed by: INTERNAL MEDICINE

## 2021-08-24 PROCEDURE — 71046 X-RAY EXAM CHEST 2 VIEWS: CPT | Mod: 26,,, | Performed by: RADIOLOGY

## 2021-08-24 PROCEDURE — 3051F PR MOST RECENT HEMOGLOBIN A1C LEVEL 7.0 - < 8.0%: ICD-10-PCS | Mod: CPTII,S$GLB,, | Performed by: INTERNAL MEDICINE

## 2021-08-24 PROCEDURE — 3074F SYST BP LT 130 MM HG: CPT | Mod: CPTII,S$GLB,, | Performed by: INTERNAL MEDICINE

## 2021-08-24 PROCEDURE — 1126F PR PAIN SEVERITY QUANTIFIED, NO PAIN PRESENT: ICD-10-PCS | Mod: CPTII,S$GLB,, | Performed by: INTERNAL MEDICINE

## 2021-08-24 PROCEDURE — 93005 ELECTROCARDIOGRAM TRACING: CPT | Mod: S$GLB,,, | Performed by: INTERNAL MEDICINE

## 2021-08-24 PROCEDURE — 1160F PR REVIEW ALL MEDS BY PRESCRIBER/CLIN PHARMACIST DOCUMENTED: ICD-10-PCS | Mod: CPTII,S$GLB,, | Performed by: INTERNAL MEDICINE

## 2021-08-24 PROCEDURE — 1160F RVW MEDS BY RX/DR IN RCRD: CPT | Mod: CPTII,S$GLB,, | Performed by: INTERNAL MEDICINE

## 2021-08-24 PROCEDURE — 99215 OFFICE O/P EST HI 40 MIN: CPT | Mod: S$GLB,,, | Performed by: INTERNAL MEDICINE

## 2021-08-24 PROCEDURE — 93005 EKG 12-LEAD: ICD-10-PCS | Mod: S$GLB,,, | Performed by: INTERNAL MEDICINE

## 2021-08-24 PROCEDURE — 71046 X-RAY EXAM CHEST 2 VIEWS: CPT | Mod: TC

## 2021-08-24 PROCEDURE — 3078F PR MOST RECENT DIASTOLIC BLOOD PRESSURE < 80 MM HG: ICD-10-PCS | Mod: CPTII,S$GLB,, | Performed by: INTERNAL MEDICINE

## 2021-08-24 PROCEDURE — 99999 PR PBB SHADOW E&M-EST. PATIENT-LVL V: ICD-10-PCS | Mod: PBBFAC,,, | Performed by: INTERNAL MEDICINE

## 2021-08-24 PROCEDURE — 71046 XR CHEST PA AND LATERAL: ICD-10-PCS | Mod: 26,,, | Performed by: RADIOLOGY

## 2021-08-24 PROCEDURE — 3008F BODY MASS INDEX DOCD: CPT | Mod: CPTII,S$GLB,, | Performed by: INTERNAL MEDICINE

## 2021-08-24 PROCEDURE — 3078F DIAST BP <80 MM HG: CPT | Mod: CPTII,S$GLB,, | Performed by: INTERNAL MEDICINE

## 2021-08-24 PROCEDURE — 3072F PR LOW RISK FOR RETINOPATHY: ICD-10-PCS | Mod: CPTII,S$GLB,, | Performed by: INTERNAL MEDICINE

## 2021-08-24 PROCEDURE — 3051F HG A1C>EQUAL 7.0%<8.0%: CPT | Mod: CPTII,S$GLB,, | Performed by: INTERNAL MEDICINE

## 2021-08-24 PROCEDURE — 99215 PR OFFICE/OUTPT VISIT, EST, LEVL V, 40-54 MIN: ICD-10-PCS | Mod: S$GLB,,, | Performed by: INTERNAL MEDICINE

## 2021-08-24 PROCEDURE — 93010 ELECTROCARDIOGRAM REPORT: CPT | Mod: S$GLB,,, | Performed by: INTERNAL MEDICINE

## 2021-08-24 PROCEDURE — 93010 EKG 12-LEAD: ICD-10-PCS | Mod: S$GLB,,, | Performed by: INTERNAL MEDICINE

## 2021-08-24 PROCEDURE — 1159F MED LIST DOCD IN RCRD: CPT | Mod: CPTII,S$GLB,, | Performed by: INTERNAL MEDICINE

## 2021-08-24 PROCEDURE — 3074F PR MOST RECENT SYSTOLIC BLOOD PRESSURE < 130 MM HG: ICD-10-PCS | Mod: CPTII,S$GLB,, | Performed by: INTERNAL MEDICINE

## 2021-08-24 PROCEDURE — 1159F PR MEDICATION LIST DOCUMENTED IN MEDICAL RECORD: ICD-10-PCS | Mod: CPTII,S$GLB,, | Performed by: INTERNAL MEDICINE

## 2021-08-24 PROCEDURE — 1126F AMNT PAIN NOTED NONE PRSNT: CPT | Mod: CPTII,S$GLB,, | Performed by: INTERNAL MEDICINE

## 2021-08-24 RX ORDER — FUROSEMIDE 20 MG/1
20 TABLET ORAL DAILY
Qty: 30 TABLET | Refills: 3 | Status: SHIPPED | OUTPATIENT
Start: 2021-08-24 | End: 2021-12-10

## 2021-08-25 DIAGNOSIS — Z95.1 HX OF CABG: Primary | ICD-10-CM

## 2021-08-26 ENCOUNTER — CLINICAL SUPPORT (OUTPATIENT)
Dept: REHABILITATION | Facility: HOSPITAL | Age: 76
End: 2021-08-26
Payer: MEDICARE

## 2021-08-26 DIAGNOSIS — M25.561 CHRONIC PAIN OF RIGHT KNEE: ICD-10-CM

## 2021-08-26 DIAGNOSIS — G89.29 CHRONIC PAIN OF RIGHT KNEE: ICD-10-CM

## 2021-08-26 DIAGNOSIS — M25.662 DECREASED RANGE OF MOTION OF LEFT KNEE: ICD-10-CM

## 2021-08-26 DIAGNOSIS — R26.89 ANTALGIC GAIT: ICD-10-CM

## 2021-08-26 PROCEDURE — 97140 MANUAL THERAPY 1/> REGIONS: CPT | Mod: PO

## 2021-08-26 PROCEDURE — 97110 THERAPEUTIC EXERCISES: CPT | Mod: PO

## 2021-09-07 ENCOUNTER — TELEPHONE (OUTPATIENT)
Dept: REHABILITATION | Facility: HOSPITAL | Age: 76
End: 2021-09-07

## 2021-09-08 ENCOUNTER — CLINICAL SUPPORT (OUTPATIENT)
Dept: REHABILITATION | Facility: HOSPITAL | Age: 76
End: 2021-09-08
Payer: MEDICARE

## 2021-09-08 ENCOUNTER — PATIENT MESSAGE (OUTPATIENT)
Dept: ENDOCRINOLOGY | Facility: CLINIC | Age: 76
End: 2021-09-08

## 2021-09-08 ENCOUNTER — OFFICE VISIT (OUTPATIENT)
Dept: ENDOCRINOLOGY | Facility: CLINIC | Age: 76
End: 2021-09-08
Payer: MEDICARE

## 2021-09-08 ENCOUNTER — LAB VISIT (OUTPATIENT)
Dept: LAB | Facility: HOSPITAL | Age: 76
End: 2021-09-08
Payer: MEDICARE

## 2021-09-08 VITALS
SYSTOLIC BLOOD PRESSURE: 102 MMHG | BODY MASS INDEX: 43.86 KG/M2 | HEIGHT: 62 IN | DIASTOLIC BLOOD PRESSURE: 70 MMHG | WEIGHT: 238.31 LBS

## 2021-09-08 DIAGNOSIS — R26.89 ANTALGIC GAIT: ICD-10-CM

## 2021-09-08 DIAGNOSIS — E11.22 TYPE 2 DIABETES MELLITUS WITH STAGE 3 CHRONIC KIDNEY DISEASE AND HYPERTENSION: Chronic | ICD-10-CM

## 2021-09-08 DIAGNOSIS — I12.9 TYPE 2 DIABETES MELLITUS WITH STAGE 3 CHRONIC KIDNEY DISEASE AND HYPERTENSION: Primary | ICD-10-CM

## 2021-09-08 DIAGNOSIS — M81.0 OSTEOPOROSIS WITHOUT CURRENT PATHOLOGICAL FRACTURE, UNSPECIFIED OSTEOPOROSIS TYPE: Chronic | ICD-10-CM

## 2021-09-08 DIAGNOSIS — I12.9 TYPE 2 DIABETES MELLITUS WITH STAGE 3 CHRONIC KIDNEY DISEASE AND HYPERTENSION: Chronic | ICD-10-CM

## 2021-09-08 DIAGNOSIS — M25.561 CHRONIC PAIN OF RIGHT KNEE: ICD-10-CM

## 2021-09-08 DIAGNOSIS — M25.662 DECREASED RANGE OF MOTION OF LEFT KNEE: ICD-10-CM

## 2021-09-08 DIAGNOSIS — N18.30 TYPE 2 DIABETES MELLITUS WITH STAGE 3 CHRONIC KIDNEY DISEASE AND HYPERTENSION: Chronic | ICD-10-CM

## 2021-09-08 DIAGNOSIS — I10 ESSENTIAL HYPERTENSION: Chronic | ICD-10-CM

## 2021-09-08 DIAGNOSIS — N18.30 TYPE 2 DIABETES MELLITUS WITH STAGE 3 CHRONIC KIDNEY DISEASE AND HYPERTENSION: Primary | ICD-10-CM

## 2021-09-08 DIAGNOSIS — E11.22 TYPE 2 DIABETES MELLITUS WITH STAGE 3 CHRONIC KIDNEY DISEASE AND HYPERTENSION: Primary | ICD-10-CM

## 2021-09-08 DIAGNOSIS — E55.9 VITAMIN D DEFICIENCY, UNSPECIFIED: ICD-10-CM

## 2021-09-08 DIAGNOSIS — I25.810 CORONARY ARTERY DISEASE INVOLVING CORONARY BYPASS GRAFT OF NATIVE HEART WITHOUT ANGINA PECTORIS: ICD-10-CM

## 2021-09-08 DIAGNOSIS — E78.5 DYSLIPIDEMIA, GOAL LDL BELOW 70: ICD-10-CM

## 2021-09-08 DIAGNOSIS — G89.29 CHRONIC PAIN OF RIGHT KNEE: ICD-10-CM

## 2021-09-08 LAB
ALBUMIN SERPL BCP-MCNC: 3.6 G/DL (ref 3.5–5.2)
ALP SERPL-CCNC: 90 U/L (ref 55–135)
ALT SERPL W/O P-5'-P-CCNC: 26 U/L (ref 10–44)
ANION GAP SERPL CALC-SCNC: 11 MMOL/L (ref 8–16)
AST SERPL-CCNC: 19 U/L (ref 10–40)
BILIRUB SERPL-MCNC: 0.5 MG/DL (ref 0.1–1)
BUN SERPL-MCNC: 38 MG/DL (ref 8–23)
CALCIUM SERPL-MCNC: 10.6 MG/DL (ref 8.7–10.5)
CHLORIDE SERPL-SCNC: 100 MMOL/L (ref 95–110)
CHOLEST SERPL-MCNC: 91 MG/DL (ref 120–199)
CHOLEST/HDLC SERPL: 2.9 {RATIO} (ref 2–5)
CO2 SERPL-SCNC: 28 MMOL/L (ref 23–29)
CREAT SERPL-MCNC: 1.3 MG/DL (ref 0.5–1.4)
EST. GFR  (AFRICAN AMERICAN): 46.7 ML/MIN/1.73 M^2
EST. GFR  (NON AFRICAN AMERICAN): 40.5 ML/MIN/1.73 M^2
ESTIMATED AVG GLUCOSE: 160 MG/DL (ref 68–131)
GLUCOSE SERPL-MCNC: 135 MG/DL (ref 70–110)
HBA1C MFR BLD: 7.2 % (ref 4–5.6)
HDLC SERPL-MCNC: 31 MG/DL (ref 40–75)
HDLC SERPL: 34.1 % (ref 20–50)
LDLC SERPL CALC-MCNC: 42.8 MG/DL (ref 63–159)
NONHDLC SERPL-MCNC: 60 MG/DL
POTASSIUM SERPL-SCNC: 4.2 MMOL/L (ref 3.5–5.1)
PROT SERPL-MCNC: 8.7 G/DL (ref 6–8.4)
SODIUM SERPL-SCNC: 139 MMOL/L (ref 136–145)
TRIGL SERPL-MCNC: 86 MG/DL (ref 30–150)

## 2021-09-08 PROCEDURE — 3051F PR MOST RECENT HEMOGLOBIN A1C LEVEL 7.0 - < 8.0%: ICD-10-PCS | Mod: CPTII,S$GLB,, | Performed by: NURSE PRACTITIONER

## 2021-09-08 PROCEDURE — 80061 LIPID PANEL: CPT | Performed by: NURSE PRACTITIONER

## 2021-09-08 PROCEDURE — 99999 PR PBB SHADOW E&M-EST. PATIENT-LVL IV: ICD-10-PCS | Mod: PBBFAC,,, | Performed by: NURSE PRACTITIONER

## 2021-09-08 PROCEDURE — 99999 PR PBB SHADOW E&M-EST. PATIENT-LVL IV: CPT | Mod: PBBFAC,,, | Performed by: NURSE PRACTITIONER

## 2021-09-08 PROCEDURE — 3072F PR LOW RISK FOR RETINOPATHY: ICD-10-PCS | Mod: CPTII,S$GLB,, | Performed by: NURSE PRACTITIONER

## 2021-09-08 PROCEDURE — 3288F FALL RISK ASSESSMENT DOCD: CPT | Mod: CPTII,S$GLB,, | Performed by: NURSE PRACTITIONER

## 2021-09-08 PROCEDURE — 3288F PR FALLS RISK ASSESSMENT DOCUMENTED: ICD-10-PCS | Mod: CPTII,S$GLB,, | Performed by: NURSE PRACTITIONER

## 2021-09-08 PROCEDURE — 1159F PR MEDICATION LIST DOCUMENTED IN MEDICAL RECORD: ICD-10-PCS | Mod: CPTII,S$GLB,, | Performed by: NURSE PRACTITIONER

## 2021-09-08 PROCEDURE — 4010F ACE/ARB THERAPY RXD/TAKEN: CPT | Mod: CPTII,S$GLB,, | Performed by: NURSE PRACTITIONER

## 2021-09-08 PROCEDURE — 3074F PR MOST RECENT SYSTOLIC BLOOD PRESSURE < 130 MM HG: ICD-10-PCS | Mod: CPTII,S$GLB,, | Performed by: NURSE PRACTITIONER

## 2021-09-08 PROCEDURE — 3008F BODY MASS INDEX DOCD: CPT | Mod: CPTII,S$GLB,, | Performed by: NURSE PRACTITIONER

## 2021-09-08 PROCEDURE — 99214 OFFICE O/P EST MOD 30 MIN: CPT | Mod: S$GLB,,, | Performed by: NURSE PRACTITIONER

## 2021-09-08 PROCEDURE — 83036 HEMOGLOBIN GLYCOSYLATED A1C: CPT | Performed by: NURSE PRACTITIONER

## 2021-09-08 PROCEDURE — 97110 THERAPEUTIC EXERCISES: CPT | Mod: PN,CQ

## 2021-09-08 PROCEDURE — 3072F LOW RISK FOR RETINOPATHY: CPT | Mod: CPTII,S$GLB,, | Performed by: NURSE PRACTITIONER

## 2021-09-08 PROCEDURE — 99214 PR OFFICE/OUTPT VISIT, EST, LEVL IV, 30-39 MIN: ICD-10-PCS | Mod: S$GLB,,, | Performed by: NURSE PRACTITIONER

## 2021-09-08 PROCEDURE — 3078F PR MOST RECENT DIASTOLIC BLOOD PRESSURE < 80 MM HG: ICD-10-PCS | Mod: CPTII,S$GLB,, | Performed by: NURSE PRACTITIONER

## 2021-09-08 PROCEDURE — 36415 COLL VENOUS BLD VENIPUNCTURE: CPT | Performed by: NURSE PRACTITIONER

## 2021-09-08 PROCEDURE — 1101F PT FALLS ASSESS-DOCD LE1/YR: CPT | Mod: CPTII,S$GLB,, | Performed by: NURSE PRACTITIONER

## 2021-09-08 PROCEDURE — 4010F PR ACE/ARB THEARPY RXD/TAKEN: ICD-10-PCS | Mod: CPTII,S$GLB,, | Performed by: NURSE PRACTITIONER

## 2021-09-08 PROCEDURE — 3008F PR BODY MASS INDEX (BMI) DOCUMENTED: ICD-10-PCS | Mod: CPTII,S$GLB,, | Performed by: NURSE PRACTITIONER

## 2021-09-08 PROCEDURE — 80053 COMPREHEN METABOLIC PANEL: CPT | Performed by: NURSE PRACTITIONER

## 2021-09-08 PROCEDURE — 3051F HG A1C>EQUAL 7.0%<8.0%: CPT | Mod: CPTII,S$GLB,, | Performed by: NURSE PRACTITIONER

## 2021-09-08 PROCEDURE — 3078F DIAST BP <80 MM HG: CPT | Mod: CPTII,S$GLB,, | Performed by: NURSE PRACTITIONER

## 2021-09-08 PROCEDURE — 3074F SYST BP LT 130 MM HG: CPT | Mod: CPTII,S$GLB,, | Performed by: NURSE PRACTITIONER

## 2021-09-08 PROCEDURE — 1125F AMNT PAIN NOTED PAIN PRSNT: CPT | Mod: CPTII,S$GLB,, | Performed by: NURSE PRACTITIONER

## 2021-09-08 PROCEDURE — 1101F PR PT FALLS ASSESS DOC 0-1 FALLS W/OUT INJ PAST YR: ICD-10-PCS | Mod: CPTII,S$GLB,, | Performed by: NURSE PRACTITIONER

## 2021-09-08 PROCEDURE — 1159F MED LIST DOCD IN RCRD: CPT | Mod: CPTII,S$GLB,, | Performed by: NURSE PRACTITIONER

## 2021-09-08 PROCEDURE — 1125F PR PAIN SEVERITY QUANTIFIED, PAIN PRESENT: ICD-10-PCS | Mod: CPTII,S$GLB,, | Performed by: NURSE PRACTITIONER

## 2021-09-08 RX ORDER — FLUCONAZOLE 150 MG/1
150 TABLET ORAL DAILY
Qty: 1 TABLET | Refills: 1 | Status: SHIPPED | OUTPATIENT
Start: 2021-09-08 | End: 2021-09-09

## 2021-09-09 ENCOUNTER — HOSPITAL ENCOUNTER (OUTPATIENT)
Dept: CARDIOLOGY | Facility: HOSPITAL | Age: 76
Discharge: HOME | End: 2021-09-09
Attending: INTERNAL MEDICINE
Payer: MEDICARE

## 2021-09-09 VITALS
WEIGHT: 238 LBS | HEIGHT: 62 IN | DIASTOLIC BLOOD PRESSURE: 70 MMHG | HEART RATE: 64 BPM | SYSTOLIC BLOOD PRESSURE: 102 MMHG | BODY MASS INDEX: 43.79 KG/M2

## 2021-09-09 DIAGNOSIS — I12.9 TYPE 2 DIABETES MELLITUS WITH STAGE 3 CHRONIC KIDNEY DISEASE AND HYPERTENSION: Primary | ICD-10-CM

## 2021-09-09 DIAGNOSIS — I25.810 CORONARY ARTERY DISEASE INVOLVING CORONARY BYPASS GRAFT OF NATIVE HEART WITHOUT ANGINA PECTORIS: ICD-10-CM

## 2021-09-09 DIAGNOSIS — E11.22 TYPE 2 DIABETES MELLITUS WITH STAGE 3 CHRONIC KIDNEY DISEASE AND HYPERTENSION: Primary | ICD-10-CM

## 2021-09-09 DIAGNOSIS — E83.52 HYPERCALCEMIA: ICD-10-CM

## 2021-09-09 DIAGNOSIS — N18.30 TYPE 2 DIABETES MELLITUS WITH STAGE 3 CHRONIC KIDNEY DISEASE AND HYPERTENSION: Primary | ICD-10-CM

## 2021-09-09 LAB
ASCENDING AORTA: 2.84 CM
AV INDEX (PROSTH): 0.72
AV MEAN GRADIENT: 7 MMHG
AV PEAK GRADIENT: 12 MMHG
AV VALVE AREA: 2.28 CM2
AV VELOCITY RATIO: 0.57
BSA FOR ECHO PROCEDURE: 2.17 M2
CV ECHO LV RWT: 0.5 CM
DOP CALC AO PEAK VEL: 1.76 M/S
DOP CALC AO VTI: 33.44 CM
DOP CALC LVOT AREA: 3.2 CM2
DOP CALC LVOT DIAMETER: 2.01 CM
DOP CALC LVOT PEAK VEL: 1 M/S
DOP CALC LVOT STROKE VOLUME: 76.12 CM3
DOP CALCLVOT PEAK VEL VTI: 24 CM
E WAVE DECELERATION TIME: 276.47 MSEC
E/A RATIO: 0.82
E/E' RATIO: 11.2 M/S
ECHO LV POSTERIOR WALL: 0.79 CM (ref 0.6–1.1)
EJECTION FRACTION: 60 %
FRACTIONAL SHORTENING: 33 % (ref 28–44)
INTERVENTRICULAR SEPTUM: 0.99 CM (ref 0.6–1.1)
IVRT: 62.8 MSEC
LA MAJOR: 4.88 CM
LA MINOR: 4.87 CM
LA WIDTH: 3.54 CM
LEFT ATRIUM SIZE: 3.51 CM
LEFT ATRIUM VOLUME INDEX MOD: 20.3 ML/M2
LEFT ATRIUM VOLUME INDEX: 25 ML/M2
LEFT ATRIUM VOLUME MOD: 41.9 CM3
LEFT ATRIUM VOLUME: 51.49 CM3
LEFT INTERNAL DIMENSION IN SYSTOLE: 2.12 CM (ref 2.1–4)
LEFT VENTRICLE DIASTOLIC VOLUME INDEX: 19.24 ML/M2
LEFT VENTRICLE DIASTOLIC VOLUME: 39.63 ML
LEFT VENTRICLE MASS INDEX: 36 G/M2
LEFT VENTRICLE SYSTOLIC VOLUME INDEX: 7.2 ML/M2
LEFT VENTRICLE SYSTOLIC VOLUME: 14.83 ML
LEFT VENTRICULAR INTERNAL DIMENSION IN DIASTOLE: 3.16 CM (ref 3.5–6)
LEFT VENTRICULAR MASS: 74.65 G
LV LATERAL E/E' RATIO: 9.33 M/S
LV SEPTAL E/E' RATIO: 14 M/S
MV A" WAVE DURATION": 13.99 MSEC
MV PEAK A VEL: 1.03 M/S
MV PEAK E VEL: 0.84 M/S
MV STENOSIS PRESSURE HALF TIME: 80.18 MS
MV VALVE AREA P 1/2 METHOD: 2.74 CM2
PISA TR MAX VEL: 2.33 M/S
PULM VEIN S/D RATIO: 1.28
PV PEAK D VEL: 0.36 M/S
PV PEAK S VEL: 0.46 M/S
RA MAJOR: 4.42 CM
RA PRESSURE: 3 MMHG
RA WIDTH: 2.79 CM
RIGHT VENTRICULAR END-DIASTOLIC DIMENSION: 2.43 CM
RV TISSUE DOPPLER FREE WALL SYSTOLIC VELOCITY 1 (APICAL 4 CHAMBER VIEW): 10.55 CM/S
SINUS: 2.4 CM
STJ: 2.57 CM
TDI LATERAL: 0.09 M/S
TDI SEPTAL: 0.06 M/S
TDI: 0.08 M/S
TR MAX PG: 22 MMHG
TV REST PULMONARY ARTERY PRESSURE: 25 MMHG

## 2021-09-09 PROCEDURE — 93306 TTE W/DOPPLER COMPLETE: CPT | Mod: 26,,, | Performed by: INTERNAL MEDICINE

## 2021-09-09 PROCEDURE — 93306 ECHO (CUPID ONLY): ICD-10-PCS | Mod: 26,,, | Performed by: INTERNAL MEDICINE

## 2021-09-09 PROCEDURE — 93306 TTE W/DOPPLER COMPLETE: CPT

## 2021-09-10 ENCOUNTER — PATIENT MESSAGE (OUTPATIENT)
Dept: ENDOCRINOLOGY | Facility: CLINIC | Age: 76
End: 2021-09-10

## 2021-09-10 DIAGNOSIS — N18.30 TYPE 2 DIABETES MELLITUS WITH STAGE 3 CHRONIC KIDNEY DISEASE AND HYPERTENSION: Primary | ICD-10-CM

## 2021-09-10 DIAGNOSIS — E11.22 TYPE 2 DIABETES MELLITUS WITH STAGE 3 CHRONIC KIDNEY DISEASE AND HYPERTENSION: Primary | ICD-10-CM

## 2021-09-10 DIAGNOSIS — I12.9 TYPE 2 DIABETES MELLITUS WITH STAGE 3 CHRONIC KIDNEY DISEASE AND HYPERTENSION: Primary | ICD-10-CM

## 2021-09-10 RX ORDER — DAPAGLIFLOZIN 5 MG/1
5 TABLET, FILM COATED ORAL DAILY
Qty: 30 TABLET | Refills: 3 | Status: SHIPPED | OUTPATIENT
Start: 2021-09-10 | End: 2021-10-28

## 2021-09-13 DIAGNOSIS — I25.118 CORONARY ARTERY DISEASE OF NATIVE ARTERY OF NATIVE HEART WITH STABLE ANGINA PECTORIS: Primary | ICD-10-CM

## 2021-09-14 ENCOUNTER — CLINICAL SUPPORT (OUTPATIENT)
Dept: REHABILITATION | Facility: HOSPITAL | Age: 76
End: 2021-09-14
Payer: MEDICARE

## 2021-09-14 DIAGNOSIS — M25.662 DECREASED RANGE OF MOTION OF LEFT KNEE: ICD-10-CM

## 2021-09-14 DIAGNOSIS — R26.89 ANTALGIC GAIT: ICD-10-CM

## 2021-09-14 DIAGNOSIS — G89.29 CHRONIC PAIN OF RIGHT KNEE: ICD-10-CM

## 2021-09-14 DIAGNOSIS — M25.561 CHRONIC PAIN OF RIGHT KNEE: ICD-10-CM

## 2021-09-14 PROCEDURE — 97110 THERAPEUTIC EXERCISES: CPT | Mod: PO

## 2021-09-14 PROCEDURE — 97140 MANUAL THERAPY 1/> REGIONS: CPT | Mod: PO

## 2021-09-15 ENCOUNTER — HOSPITAL ENCOUNTER (OUTPATIENT)
Dept: CARDIOLOGY | Facility: HOSPITAL | Age: 76
Discharge: HOME | End: 2021-09-15
Attending: INTERNAL MEDICINE
Payer: MEDICARE

## 2021-09-15 DIAGNOSIS — I25.118 CORONARY ARTERY DISEASE OF NATIVE ARTERY OF NATIVE HEART WITH STABLE ANGINA PECTORIS: ICD-10-CM

## 2021-09-15 DIAGNOSIS — I12.9 TYPE 2 DIABETES MELLITUS WITH STAGE 3 CHRONIC KIDNEY DISEASE AND HYPERTENSION: ICD-10-CM

## 2021-09-15 DIAGNOSIS — N18.30 TYPE 2 DIABETES MELLITUS WITH STAGE 3 CHRONIC KIDNEY DISEASE AND HYPERTENSION: ICD-10-CM

## 2021-09-15 DIAGNOSIS — E11.22 TYPE 2 DIABETES MELLITUS WITH STAGE 3 CHRONIC KIDNEY DISEASE AND HYPERTENSION: ICD-10-CM

## 2021-09-15 RX ORDER — GLIPIZIDE 5 MG/1
10 TABLET ORAL
Qty: 90 TABLET | Refills: 1 | Status: SHIPPED | OUTPATIENT
Start: 2021-09-15 | End: 2021-10-19

## 2021-09-16 ENCOUNTER — CLINICAL SUPPORT (OUTPATIENT)
Dept: REHABILITATION | Facility: HOSPITAL | Age: 76
End: 2021-09-16
Payer: MEDICARE

## 2021-09-16 DIAGNOSIS — G89.29 CHRONIC PAIN OF RIGHT KNEE: ICD-10-CM

## 2021-09-16 DIAGNOSIS — R26.89 ANTALGIC GAIT: ICD-10-CM

## 2021-09-16 DIAGNOSIS — M25.662 DECREASED RANGE OF MOTION OF LEFT KNEE: ICD-10-CM

## 2021-09-16 DIAGNOSIS — M25.561 CHRONIC PAIN OF RIGHT KNEE: ICD-10-CM

## 2021-09-16 PROCEDURE — 97140 MANUAL THERAPY 1/> REGIONS: CPT | Mod: PO

## 2021-09-16 PROCEDURE — 97110 THERAPEUTIC EXERCISES: CPT | Mod: PO

## 2021-09-17 ENCOUNTER — PATIENT MESSAGE (OUTPATIENT)
Dept: CARDIOLOGY | Facility: CLINIC | Age: 76
End: 2021-09-17

## 2021-09-20 ENCOUNTER — CLINICAL SUPPORT (OUTPATIENT)
Dept: REHABILITATION | Facility: HOSPITAL | Age: 76
End: 2021-09-20
Payer: MEDICARE

## 2021-09-20 DIAGNOSIS — M25.662 DECREASED RANGE OF MOTION OF LEFT KNEE: ICD-10-CM

## 2021-09-20 DIAGNOSIS — R26.89 ANTALGIC GAIT: ICD-10-CM

## 2021-09-20 DIAGNOSIS — M25.561 CHRONIC PAIN OF RIGHT KNEE: ICD-10-CM

## 2021-09-20 DIAGNOSIS — G89.29 CHRONIC PAIN OF RIGHT KNEE: ICD-10-CM

## 2021-09-20 PROCEDURE — 97110 THERAPEUTIC EXERCISES: CPT | Mod: PO

## 2021-09-20 PROCEDURE — 97140 MANUAL THERAPY 1/> REGIONS: CPT | Mod: PO

## 2021-09-22 ENCOUNTER — TELEPHONE (OUTPATIENT)
Dept: CARDIOLOGY | Facility: CLINIC | Age: 76
End: 2021-09-22

## 2021-09-23 ENCOUNTER — LAB VISIT (OUTPATIENT)
Dept: LAB | Facility: HOSPITAL | Age: 76
End: 2021-09-23
Attending: INTERNAL MEDICINE
Payer: MEDICARE

## 2021-09-23 ENCOUNTER — CLINICAL SUPPORT (OUTPATIENT)
Dept: REHABILITATION | Facility: HOSPITAL | Age: 76
End: 2021-09-23
Payer: MEDICARE

## 2021-09-23 DIAGNOSIS — M25.662 DECREASED RANGE OF MOTION OF LEFT KNEE: ICD-10-CM

## 2021-09-23 DIAGNOSIS — N18.30 TYPE 2 DIABETES MELLITUS WITH STAGE 3 CHRONIC KIDNEY DISEASE AND HYPERTENSION: ICD-10-CM

## 2021-09-23 DIAGNOSIS — R26.89 ANTALGIC GAIT: ICD-10-CM

## 2021-09-23 DIAGNOSIS — G89.29 CHRONIC PAIN OF RIGHT KNEE: ICD-10-CM

## 2021-09-23 DIAGNOSIS — I12.9 TYPE 2 DIABETES MELLITUS WITH STAGE 3 CHRONIC KIDNEY DISEASE AND HYPERTENSION: ICD-10-CM

## 2021-09-23 DIAGNOSIS — M25.561 CHRONIC PAIN OF RIGHT KNEE: ICD-10-CM

## 2021-09-23 DIAGNOSIS — E11.22 TYPE 2 DIABETES MELLITUS WITH STAGE 3 CHRONIC KIDNEY DISEASE AND HYPERTENSION: ICD-10-CM

## 2021-09-23 DIAGNOSIS — E83.52 HYPERCALCEMIA: ICD-10-CM

## 2021-09-23 PROCEDURE — 97110 THERAPEUTIC EXERCISES: CPT | Mod: PO

## 2021-09-23 PROCEDURE — 36415 COLL VENOUS BLD VENIPUNCTURE: CPT | Mod: PN | Performed by: NURSE PRACTITIONER

## 2021-09-23 PROCEDURE — 83970 ASSAY OF PARATHORMONE: CPT | Performed by: NURSE PRACTITIONER

## 2021-09-23 PROCEDURE — 80069 RENAL FUNCTION PANEL: CPT | Performed by: NURSE PRACTITIONER

## 2021-09-23 PROCEDURE — 97140 MANUAL THERAPY 1/> REGIONS: CPT | Mod: PO

## 2021-09-24 ENCOUNTER — HOSPITAL ENCOUNTER (OUTPATIENT)
Dept: CARDIOLOGY | Facility: HOSPITAL | Age: 76
Discharge: HOME | End: 2021-09-24
Attending: INTERNAL MEDICINE
Payer: MEDICARE

## 2021-09-24 ENCOUNTER — PATIENT MESSAGE (OUTPATIENT)
Dept: ENDOCRINOLOGY | Facility: CLINIC | Age: 76
End: 2021-09-24

## 2021-09-24 VITALS
DIASTOLIC BLOOD PRESSURE: 71 MMHG | WEIGHT: 238 LBS | SYSTOLIC BLOOD PRESSURE: 108 MMHG | HEIGHT: 62 IN | BODY MASS INDEX: 43.79 KG/M2 | HEART RATE: 82 BPM

## 2021-09-24 DIAGNOSIS — I12.9 TYPE 2 DIABETES MELLITUS WITH STAGE 3 CHRONIC KIDNEY DISEASE AND HYPERTENSION: Primary | ICD-10-CM

## 2021-09-24 DIAGNOSIS — I25.118 CORONARY ARTERY DISEASE OF NATIVE ARTERY OF NATIVE HEART WITH STABLE ANGINA PECTORIS: Primary | ICD-10-CM

## 2021-09-24 DIAGNOSIS — E11.22 TYPE 2 DIABETES MELLITUS WITH STAGE 3 CHRONIC KIDNEY DISEASE AND HYPERTENSION: Primary | ICD-10-CM

## 2021-09-24 DIAGNOSIS — N18.30 TYPE 2 DIABETES MELLITUS WITH STAGE 3 CHRONIC KIDNEY DISEASE AND HYPERTENSION: Primary | ICD-10-CM

## 2021-09-24 LAB
ALBUMIN SERPL BCP-MCNC: 3.3 G/DL (ref 3.5–5.2)
ANION GAP SERPL CALC-SCNC: 14 MMOL/L (ref 8–16)
BUN SERPL-MCNC: 41 MG/DL (ref 8–23)
CALCIUM SERPL-MCNC: 10.1 MG/DL (ref 8.7–10.5)
CFR FLOW- DEFECT 1: 1.02
CFR FLOW- DEFECT 2: 1.39
CHLORIDE SERPL-SCNC: 99 MMOL/L (ref 95–110)
CO2 SERPL-SCNC: 24 MMOL/L (ref 23–29)
CREAT SERPL-MCNC: 1.6 MG/DL (ref 0.5–1.4)
CV PHARM DOSE: 60 MG
CV STRESS BASE HR: 82 BPM
DIASTOLIC BLOOD PRESSURE: 71 MMHG
EJECTION FRACTION- HIGH: 65 %
END DIASTOLIC INDEX-HIGH: 153 ML/M2
END DIASTOLIC INDEX-LOW: 93 ML/M2
END SYSTOLIC INDEX-HIGH: 71 ML/M2
END SYSTOLIC INDEX-LOW: 31 ML/M2
EST. GFR  (AFRICAN AMERICAN): 36.3 ML/MIN/1.73 M^2
EST. GFR  (NON AFRICAN AMERICAN): 31.5 ML/MIN/1.73 M^2
GLUCOSE SERPL-MCNC: 169 MG/DL (ref 70–110)
NUC REST DIASTOLIC VOLUME INDEX: 66
NUC REST EJECTION FRACTION: 76
NUC REST SYSTOLIC VOLUME INDEX: 16
NUC STRESS DIASTOLIC VOLUME INDEX: 79
NUC STRESS EJECTION FRACTION: 68 %
NUC STRESS SYSTOLIC VOLUME INDEX: 25
OHS CV CPX 85 PERCENT MAX PREDICTED HEART RATE MALE: 120
OHS CV CPX MAX PREDICTED HEART RATE: 141
OHS CV CPX PATIENT IS FEMALE: 1
OHS CV CPX PATIENT IS MALE: 0
OHS CV CPX PEAK DIASTOLIC BLOOD PRESSURE: 61 MMHG
OHS CV CPX PEAK HEAR RATE: 81 BPM
OHS CV CPX PEAK RATE PRESSURE PRODUCT: 8424
OHS CV CPX PEAK SYSTOLIC BLOOD PRESSURE: 104 MMHG
OHS CV CPX PERCENT MAX PREDICTED HEART RATE ACHIEVED: 57
OHS CV CPX RATE PRESSURE PRODUCT PRESENTING: 8856
PERFUSION DEFECT 1 SIZE IN %: 13 %
PERFUSION DEFECT 2 SIZE IN %: 9 %
PERFUSION DEFECT WORSENS SIZE IN % 2: 11 %
PHOSPHATE SERPL-MCNC: 3.1 MG/DL (ref 2.7–4.5)
POTASSIUM SERPL-SCNC: 4.1 MMOL/L (ref 3.5–5.1)
PTH-INTACT SERPL-MCNC: 46.4 PG/ML (ref 9–77)
REST FLOW - ANTERIOR: 0.79 CC/MIN/G
REST FLOW - INFERIOR: 0.74 CC/MIN/G
REST FLOW - LATERAL: 0.86 CC/MIN/G
REST FLOW - MAX: 1.47 CC/MIN/G
REST FLOW - MIN: 0.46 CC/MIN/G
REST FLOW - SEPTAL: 1.01 CC/MIN/G
REST FLOW - WHOLE HEART: 0.85 CC/MIN/G
REST FLOW- DEFECT 1: 0.81 CC/MIN/G
REST FLOW- DEFECT 2: 0.57 CC/MIN/G
RETIRED EF AND QEF - SEE NOTES: 53 %
SODIUM SERPL-SCNC: 137 MMOL/L (ref 136–145)
STRESS FLOW - ANTERIOR: 0.93 CC/MIN/G
STRESS FLOW - INFERIOR: 1.42 CC/MIN/G
STRESS FLOW - LATERAL: 1.27 CC/MIN/G
STRESS FLOW - MAX: 2.52 CC/MIN/G
STRESS FLOW - MIN: 0.41 CC/MIN/G
STRESS FLOW - SEPTAL: 1.88 CC/MIN/G
STRESS FLOW - WHOLE HEART: 1.38 CC/MIN/G
STRESS FLOW- DEFECT 1: 0.82 CC/MIN/G
STRESS FLOW- DEFECT 2: 0.81 CC/MIN/G
SYSTOLIC BLOOD PRESSURE: 108 MMHG

## 2021-09-24 PROCEDURE — 93016 CV STRESS TEST SUPVJ ONLY: CPT | Mod: ,,, | Performed by: INTERNAL MEDICINE

## 2021-09-24 PROCEDURE — 78434 AQMBF PET REST & RX STRESS: CPT

## 2021-09-24 PROCEDURE — 93018 CV STRESS TEST I&R ONLY: CPT | Mod: ,,, | Performed by: INTERNAL MEDICINE

## 2021-09-24 PROCEDURE — 63600175 PHARM REV CODE 636 W HCPCS: Performed by: INTERNAL MEDICINE

## 2021-09-24 PROCEDURE — 78434 AQMBF PET REST & RX STRESS: CPT | Mod: 26,,, | Performed by: INTERNAL MEDICINE

## 2021-09-24 PROCEDURE — 78431 CARDIAC PET SCAN STRESS (CUPID ONLY): ICD-10-PCS | Mod: 26,,, | Performed by: INTERNAL MEDICINE

## 2021-09-24 PROCEDURE — 78431 MYOCRD IMG PET RST&STRS CT: CPT | Mod: 26,,, | Performed by: INTERNAL MEDICINE

## 2021-09-24 PROCEDURE — 78434 CARDIAC PET SCAN STRESS (CUPID ONLY): ICD-10-PCS | Mod: 26,,, | Performed by: INTERNAL MEDICINE

## 2021-09-24 PROCEDURE — 93016 CARDIAC PET SCAN STRESS (CUPID ONLY): ICD-10-PCS | Mod: ,,, | Performed by: INTERNAL MEDICINE

## 2021-09-24 PROCEDURE — 93018 CARDIAC PET SCAN STRESS (CUPID ONLY): ICD-10-PCS | Mod: ,,, | Performed by: INTERNAL MEDICINE

## 2021-09-24 RX ORDER — FLASH GLUCOSE SENSOR
2 KIT MISCELLANEOUS
Qty: 2 KIT | Refills: 3 | Status: SHIPPED | OUTPATIENT
Start: 2021-09-24 | End: 2021-10-24

## 2021-09-24 RX ORDER — AMINOPHYLLINE 25 MG/ML
75 INJECTION, SOLUTION INTRAVENOUS ONCE
Status: COMPLETED | OUTPATIENT
Start: 2021-09-24 | End: 2021-09-24

## 2021-09-24 RX ORDER — DIPYRIDAMOLE 5 MG/ML
60 INJECTION INTRAVENOUS ONCE
Status: COMPLETED | OUTPATIENT
Start: 2021-09-24 | End: 2021-09-24

## 2021-09-24 RX ADMIN — DIPYRIDAMOLE 60 MG: 5 INJECTION INTRAVENOUS at 08:09

## 2021-09-24 RX ADMIN — AMINOPHYLLINE 75 MG: 25 INJECTION, SOLUTION INTRAVENOUS at 08:09

## 2021-09-27 ENCOUNTER — PATIENT MESSAGE (OUTPATIENT)
Dept: ENDOCRINOLOGY | Facility: CLINIC | Age: 76
End: 2021-09-27

## 2021-09-27 ENCOUNTER — PATIENT MESSAGE (OUTPATIENT)
Dept: CARDIOLOGY | Facility: CLINIC | Age: 76
End: 2021-09-27

## 2021-09-27 ENCOUNTER — CLINICAL SUPPORT (OUTPATIENT)
Dept: REHABILITATION | Facility: HOSPITAL | Age: 76
End: 2021-09-27
Payer: MEDICARE

## 2021-09-27 DIAGNOSIS — M25.561 CHRONIC PAIN OF RIGHT KNEE: ICD-10-CM

## 2021-09-27 DIAGNOSIS — G89.29 CHRONIC PAIN OF RIGHT KNEE: ICD-10-CM

## 2021-09-27 DIAGNOSIS — M25.662 DECREASED RANGE OF MOTION OF LEFT KNEE: ICD-10-CM

## 2021-09-27 DIAGNOSIS — E83.52 HYPERCALCEMIA: Primary | ICD-10-CM

## 2021-09-27 DIAGNOSIS — R26.89 ANTALGIC GAIT: ICD-10-CM

## 2021-09-27 PROBLEM — I25.708 CORONARY ARTERY DISEASE OF BYPASS GRAFT OF NATIVE HEART WITH STABLE ANGINA PECTORIS: Chronic | Status: ACTIVE | Noted: 2019-08-12

## 2021-09-27 PROCEDURE — 97110 THERAPEUTIC EXERCISES: CPT | Mod: PO

## 2021-09-27 PROCEDURE — 97140 MANUAL THERAPY 1/> REGIONS: CPT | Mod: PO

## 2021-09-28 ENCOUNTER — OFFICE VISIT (OUTPATIENT)
Dept: CARDIOLOGY | Facility: CLINIC | Age: 76
End: 2021-09-28
Payer: MEDICARE

## 2021-09-28 ENCOUNTER — DOCUMENTATION ONLY (OUTPATIENT)
Dept: CARDIOLOGY | Facility: CLINIC | Age: 76
End: 2021-09-28

## 2021-09-28 VITALS
HEART RATE: 87 BPM | HEIGHT: 62 IN | OXYGEN SATURATION: 99 % | BODY MASS INDEX: 43.24 KG/M2 | DIASTOLIC BLOOD PRESSURE: 57 MMHG | WEIGHT: 235 LBS | SYSTOLIC BLOOD PRESSURE: 102 MMHG

## 2021-09-28 DIAGNOSIS — Z86.73 HISTORY OF TIA (TRANSIENT ISCHEMIC ATTACK): ICD-10-CM

## 2021-09-28 DIAGNOSIS — N18.31 STAGE 3A CHRONIC KIDNEY DISEASE: ICD-10-CM

## 2021-09-28 DIAGNOSIS — G47.33 OSA ON CPAP: ICD-10-CM

## 2021-09-28 DIAGNOSIS — E78.5 DYSLIPIDEMIA, GOAL LDL BELOW 70: ICD-10-CM

## 2021-09-28 DIAGNOSIS — N18.30 TYPE 2 DIABETES MELLITUS WITH STAGE 3 CHRONIC KIDNEY DISEASE AND HYPERTENSION: Chronic | ICD-10-CM

## 2021-09-28 DIAGNOSIS — E11.22 TYPE 2 DIABETES MELLITUS WITH STAGE 3 CHRONIC KIDNEY DISEASE AND HYPERTENSION: Chronic | ICD-10-CM

## 2021-09-28 DIAGNOSIS — I25.708 CORONARY ARTERY DISEASE OF BYPASS GRAFT OF NATIVE HEART WITH STABLE ANGINA PECTORIS: Chronic | ICD-10-CM

## 2021-09-28 DIAGNOSIS — Z95.1 S/P CABG (CORONARY ARTERY BYPASS GRAFT): ICD-10-CM

## 2021-09-28 DIAGNOSIS — I10 ESSENTIAL HYPERTENSION: Chronic | ICD-10-CM

## 2021-09-28 DIAGNOSIS — Z01.818 PRE-OP TESTING: ICD-10-CM

## 2021-09-28 DIAGNOSIS — R94.39 ABNORMAL STRESS TEST: Primary | ICD-10-CM

## 2021-09-28 DIAGNOSIS — I25.708 CORONARY ARTERY DISEASE OF BYPASS GRAFT OF NATIVE HEART WITH STABLE ANGINA PECTORIS: Primary | ICD-10-CM

## 2021-09-28 DIAGNOSIS — E66.01 MORBID OBESITY WITH BODY MASS INDEX (BMI) OF 40.0 OR HIGHER: ICD-10-CM

## 2021-09-28 DIAGNOSIS — D62 ACUTE BLOOD LOSS ANEMIA: ICD-10-CM

## 2021-09-28 DIAGNOSIS — I12.9 TYPE 2 DIABETES MELLITUS WITH STAGE 3 CHRONIC KIDNEY DISEASE AND HYPERTENSION: Chronic | ICD-10-CM

## 2021-09-28 DIAGNOSIS — I25.118 CORONARY ARTERY DISEASE OF NATIVE ARTERY OF NATIVE HEART WITH STABLE ANGINA PECTORIS: ICD-10-CM

## 2021-09-28 PROCEDURE — 3051F PR MOST RECENT HEMOGLOBIN A1C LEVEL 7.0 - < 8.0%: ICD-10-PCS | Mod: CPTII,S$GLB,, | Performed by: INTERNAL MEDICINE

## 2021-09-28 PROCEDURE — 3066F PR DOCUMENTATION OF TREATMENT FOR NEPHROPATHY: ICD-10-PCS | Mod: CPTII,S$GLB,, | Performed by: INTERNAL MEDICINE

## 2021-09-28 PROCEDURE — 1160F RVW MEDS BY RX/DR IN RCRD: CPT | Mod: CPTII,S$GLB,, | Performed by: INTERNAL MEDICINE

## 2021-09-28 PROCEDURE — 99214 PR OFFICE/OUTPT VISIT, EST, LEVL IV, 30-39 MIN: ICD-10-PCS | Mod: S$GLB,,, | Performed by: INTERNAL MEDICINE

## 2021-09-28 PROCEDURE — 3061F NEG MICROALBUMINURIA REV: CPT | Mod: CPTII,S$GLB,, | Performed by: INTERNAL MEDICINE

## 2021-09-28 PROCEDURE — 1159F PR MEDICATION LIST DOCUMENTED IN MEDICAL RECORD: ICD-10-PCS | Mod: CPTII,S$GLB,, | Performed by: INTERNAL MEDICINE

## 2021-09-28 PROCEDURE — 1126F PR PAIN SEVERITY QUANTIFIED, NO PAIN PRESENT: ICD-10-PCS | Mod: CPTII,S$GLB,, | Performed by: INTERNAL MEDICINE

## 2021-09-28 PROCEDURE — 99214 OFFICE O/P EST MOD 30 MIN: CPT | Mod: S$GLB,,, | Performed by: INTERNAL MEDICINE

## 2021-09-28 PROCEDURE — 4010F PR ACE/ARB THEARPY RXD/TAKEN: ICD-10-PCS | Mod: CPTII,S$GLB,, | Performed by: INTERNAL MEDICINE

## 2021-09-28 PROCEDURE — 3066F NEPHROPATHY DOC TX: CPT | Mod: CPTII,S$GLB,, | Performed by: INTERNAL MEDICINE

## 2021-09-28 PROCEDURE — 3072F LOW RISK FOR RETINOPATHY: CPT | Mod: CPTII,S$GLB,, | Performed by: INTERNAL MEDICINE

## 2021-09-28 PROCEDURE — 3051F HG A1C>EQUAL 7.0%<8.0%: CPT | Mod: CPTII,S$GLB,, | Performed by: INTERNAL MEDICINE

## 2021-09-28 PROCEDURE — 3061F PR NEG MICROALBUMINURIA RESULT DOCUMENTED/REVIEW: ICD-10-PCS | Mod: CPTII,S$GLB,, | Performed by: INTERNAL MEDICINE

## 2021-09-28 PROCEDURE — 4010F ACE/ARB THERAPY RXD/TAKEN: CPT | Mod: CPTII,S$GLB,, | Performed by: INTERNAL MEDICINE

## 2021-09-28 PROCEDURE — 3074F SYST BP LT 130 MM HG: CPT | Mod: CPTII,S$GLB,, | Performed by: INTERNAL MEDICINE

## 2021-09-28 PROCEDURE — 99999 PR PBB SHADOW E&M-EST. PATIENT-LVL V: ICD-10-PCS | Mod: PBBFAC,,, | Performed by: INTERNAL MEDICINE

## 2021-09-28 PROCEDURE — 3078F PR MOST RECENT DIASTOLIC BLOOD PRESSURE < 80 MM HG: ICD-10-PCS | Mod: CPTII,S$GLB,, | Performed by: INTERNAL MEDICINE

## 2021-09-28 PROCEDURE — 1160F PR REVIEW ALL MEDS BY PRESCRIBER/CLIN PHARMACIST DOCUMENTED: ICD-10-PCS | Mod: CPTII,S$GLB,, | Performed by: INTERNAL MEDICINE

## 2021-09-28 PROCEDURE — 3078F DIAST BP <80 MM HG: CPT | Mod: CPTII,S$GLB,, | Performed by: INTERNAL MEDICINE

## 2021-09-28 PROCEDURE — 3008F PR BODY MASS INDEX (BMI) DOCUMENTED: ICD-10-PCS | Mod: CPTII,S$GLB,, | Performed by: INTERNAL MEDICINE

## 2021-09-28 PROCEDURE — 1159F MED LIST DOCD IN RCRD: CPT | Mod: CPTII,S$GLB,, | Performed by: INTERNAL MEDICINE

## 2021-09-28 PROCEDURE — 3074F PR MOST RECENT SYSTOLIC BLOOD PRESSURE < 130 MM HG: ICD-10-PCS | Mod: CPTII,S$GLB,, | Performed by: INTERNAL MEDICINE

## 2021-09-28 PROCEDURE — 3072F PR LOW RISK FOR RETINOPATHY: ICD-10-PCS | Mod: CPTII,S$GLB,, | Performed by: INTERNAL MEDICINE

## 2021-09-28 PROCEDURE — 1126F AMNT PAIN NOTED NONE PRSNT: CPT | Mod: CPTII,S$GLB,, | Performed by: INTERNAL MEDICINE

## 2021-09-28 PROCEDURE — 99999 PR PBB SHADOW E&M-EST. PATIENT-LVL V: CPT | Mod: PBBFAC,,, | Performed by: INTERNAL MEDICINE

## 2021-09-28 PROCEDURE — 3008F BODY MASS INDEX DOCD: CPT | Mod: CPTII,S$GLB,, | Performed by: INTERNAL MEDICINE

## 2021-09-28 RX ORDER — SODIUM CHLORIDE 9 MG/ML
INJECTION, SOLUTION INTRAVENOUS CONTINUOUS
Status: CANCELLED | OUTPATIENT
Start: 2021-09-28 | End: 2021-09-28

## 2021-09-28 RX ORDER — DIPHENHYDRAMINE HCL 50 MG
50 CAPSULE ORAL ONCE
Status: CANCELLED | OUTPATIENT
Start: 2021-09-28 | End: 2021-09-28

## 2021-09-29 ENCOUNTER — OFFICE VISIT (OUTPATIENT)
Dept: SPORTS MEDICINE | Facility: CLINIC | Age: 76
End: 2021-09-29
Payer: MEDICARE

## 2021-09-29 VITALS
HEIGHT: 62 IN | SYSTOLIC BLOOD PRESSURE: 95 MMHG | WEIGHT: 235 LBS | DIASTOLIC BLOOD PRESSURE: 69 MMHG | BODY MASS INDEX: 43.24 KG/M2

## 2021-09-29 DIAGNOSIS — Z71.9 COUNSELING, UNSPECIFIED: Primary | ICD-10-CM

## 2021-09-29 DIAGNOSIS — M25.561 CHRONIC PAIN OF RIGHT KNEE: ICD-10-CM

## 2021-09-29 DIAGNOSIS — E66.01 CLASS 3 SEVERE OBESITY WITH BODY MASS INDEX (BMI) OF 40.0 TO 44.9 IN ADULT, UNSPECIFIED OBESITY TYPE, UNSPECIFIED WHETHER SERIOUS COMORBIDITY PRESENT: ICD-10-CM

## 2021-09-29 DIAGNOSIS — G89.29 CHRONIC PAIN OF RIGHT KNEE: ICD-10-CM

## 2021-09-29 DIAGNOSIS — I25.9 CHEST PAIN DUE TO MYOCARDIAL ISCHEMIA, UNSPECIFIED ISCHEMIC CHEST PAIN TYPE: ICD-10-CM

## 2021-09-29 PROCEDURE — 3074F PR MOST RECENT SYSTOLIC BLOOD PRESSURE < 130 MM HG: ICD-10-PCS | Mod: CPTII,S$GLB,, | Performed by: STUDENT IN AN ORGANIZED HEALTH CARE EDUCATION/TRAINING PROGRAM

## 2021-09-29 PROCEDURE — 3051F HG A1C>EQUAL 7.0%<8.0%: CPT | Mod: CPTII,S$GLB,, | Performed by: STUDENT IN AN ORGANIZED HEALTH CARE EDUCATION/TRAINING PROGRAM

## 2021-09-29 PROCEDURE — 4010F ACE/ARB THERAPY RXD/TAKEN: CPT | Mod: CPTII,S$GLB,, | Performed by: STUDENT IN AN ORGANIZED HEALTH CARE EDUCATION/TRAINING PROGRAM

## 2021-09-29 PROCEDURE — 3078F DIAST BP <80 MM HG: CPT | Mod: CPTII,S$GLB,, | Performed by: STUDENT IN AN ORGANIZED HEALTH CARE EDUCATION/TRAINING PROGRAM

## 2021-09-29 PROCEDURE — 1159F PR MEDICATION LIST DOCUMENTED IN MEDICAL RECORD: ICD-10-PCS | Mod: CPTII,S$GLB,, | Performed by: STUDENT IN AN ORGANIZED HEALTH CARE EDUCATION/TRAINING PROGRAM

## 2021-09-29 PROCEDURE — 3072F LOW RISK FOR RETINOPATHY: CPT | Mod: CPTII,S$GLB,, | Performed by: STUDENT IN AN ORGANIZED HEALTH CARE EDUCATION/TRAINING PROGRAM

## 2021-09-29 PROCEDURE — 3072F PR LOW RISK FOR RETINOPATHY: ICD-10-PCS | Mod: CPTII,S$GLB,, | Performed by: STUDENT IN AN ORGANIZED HEALTH CARE EDUCATION/TRAINING PROGRAM

## 2021-09-29 PROCEDURE — 3066F NEPHROPATHY DOC TX: CPT | Mod: CPTII,S$GLB,, | Performed by: STUDENT IN AN ORGANIZED HEALTH CARE EDUCATION/TRAINING PROGRAM

## 2021-09-29 PROCEDURE — 3061F PR NEG MICROALBUMINURIA RESULT DOCUMENTED/REVIEW: ICD-10-PCS | Mod: CPTII,S$GLB,, | Performed by: STUDENT IN AN ORGANIZED HEALTH CARE EDUCATION/TRAINING PROGRAM

## 2021-09-29 PROCEDURE — 1160F PR REVIEW ALL MEDS BY PRESCRIBER/CLIN PHARMACIST DOCUMENTED: ICD-10-PCS | Mod: CPTII,S$GLB,, | Performed by: STUDENT IN AN ORGANIZED HEALTH CARE EDUCATION/TRAINING PROGRAM

## 2021-09-29 PROCEDURE — 3008F BODY MASS INDEX DOCD: CPT | Mod: CPTII,S$GLB,, | Performed by: STUDENT IN AN ORGANIZED HEALTH CARE EDUCATION/TRAINING PROGRAM

## 2021-09-29 PROCEDURE — 3061F NEG MICROALBUMINURIA REV: CPT | Mod: CPTII,S$GLB,, | Performed by: STUDENT IN AN ORGANIZED HEALTH CARE EDUCATION/TRAINING PROGRAM

## 2021-09-29 PROCEDURE — 99215 OFFICE O/P EST HI 40 MIN: CPT | Mod: S$GLB,,, | Performed by: STUDENT IN AN ORGANIZED HEALTH CARE EDUCATION/TRAINING PROGRAM

## 2021-09-29 PROCEDURE — 1125F PR PAIN SEVERITY QUANTIFIED, PAIN PRESENT: ICD-10-PCS | Mod: CPTII,S$GLB,, | Performed by: STUDENT IN AN ORGANIZED HEALTH CARE EDUCATION/TRAINING PROGRAM

## 2021-09-29 PROCEDURE — 1159F MED LIST DOCD IN RCRD: CPT | Mod: CPTII,S$GLB,, | Performed by: STUDENT IN AN ORGANIZED HEALTH CARE EDUCATION/TRAINING PROGRAM

## 2021-09-29 PROCEDURE — 3008F PR BODY MASS INDEX (BMI) DOCUMENTED: ICD-10-PCS | Mod: CPTII,S$GLB,, | Performed by: STUDENT IN AN ORGANIZED HEALTH CARE EDUCATION/TRAINING PROGRAM

## 2021-09-29 PROCEDURE — 3074F SYST BP LT 130 MM HG: CPT | Mod: CPTII,S$GLB,, | Performed by: STUDENT IN AN ORGANIZED HEALTH CARE EDUCATION/TRAINING PROGRAM

## 2021-09-29 PROCEDURE — 3078F PR MOST RECENT DIASTOLIC BLOOD PRESSURE < 80 MM HG: ICD-10-PCS | Mod: CPTII,S$GLB,, | Performed by: STUDENT IN AN ORGANIZED HEALTH CARE EDUCATION/TRAINING PROGRAM

## 2021-09-29 PROCEDURE — 99215 PR OFFICE/OUTPT VISIT, EST, LEVL V, 40-54 MIN: ICD-10-PCS | Mod: S$GLB,,, | Performed by: STUDENT IN AN ORGANIZED HEALTH CARE EDUCATION/TRAINING PROGRAM

## 2021-09-29 PROCEDURE — 3066F PR DOCUMENTATION OF TREATMENT FOR NEPHROPATHY: ICD-10-PCS | Mod: CPTII,S$GLB,, | Performed by: STUDENT IN AN ORGANIZED HEALTH CARE EDUCATION/TRAINING PROGRAM

## 2021-09-29 PROCEDURE — 4010F PR ACE/ARB THEARPY RXD/TAKEN: ICD-10-PCS | Mod: CPTII,S$GLB,, | Performed by: STUDENT IN AN ORGANIZED HEALTH CARE EDUCATION/TRAINING PROGRAM

## 2021-09-29 PROCEDURE — 1125F AMNT PAIN NOTED PAIN PRSNT: CPT | Mod: CPTII,S$GLB,, | Performed by: STUDENT IN AN ORGANIZED HEALTH CARE EDUCATION/TRAINING PROGRAM

## 2021-09-29 PROCEDURE — 99999 PR PBB SHADOW E&M-EST. PATIENT-LVL IV: CPT | Mod: PBBFAC,,, | Performed by: STUDENT IN AN ORGANIZED HEALTH CARE EDUCATION/TRAINING PROGRAM

## 2021-09-29 PROCEDURE — 99999 PR PBB SHADOW E&M-EST. PATIENT-LVL IV: ICD-10-PCS | Mod: PBBFAC,,, | Performed by: STUDENT IN AN ORGANIZED HEALTH CARE EDUCATION/TRAINING PROGRAM

## 2021-09-29 PROCEDURE — 3051F PR MOST RECENT HEMOGLOBIN A1C LEVEL 7.0 - < 8.0%: ICD-10-PCS | Mod: CPTII,S$GLB,, | Performed by: STUDENT IN AN ORGANIZED HEALTH CARE EDUCATION/TRAINING PROGRAM

## 2021-09-29 PROCEDURE — 1160F RVW MEDS BY RX/DR IN RCRD: CPT | Mod: CPTII,S$GLB,, | Performed by: STUDENT IN AN ORGANIZED HEALTH CARE EDUCATION/TRAINING PROGRAM

## 2021-09-30 ENCOUNTER — CLINICAL SUPPORT (OUTPATIENT)
Dept: REHABILITATION | Facility: HOSPITAL | Age: 76
End: 2021-09-30
Payer: MEDICARE

## 2021-09-30 DIAGNOSIS — M25.662 DECREASED RANGE OF MOTION OF LEFT KNEE: ICD-10-CM

## 2021-09-30 DIAGNOSIS — G89.29 CHRONIC PAIN OF RIGHT KNEE: ICD-10-CM

## 2021-09-30 DIAGNOSIS — M25.561 CHRONIC PAIN OF RIGHT KNEE: ICD-10-CM

## 2021-09-30 DIAGNOSIS — R26.89 ANTALGIC GAIT: ICD-10-CM

## 2021-09-30 PROCEDURE — 97110 THERAPEUTIC EXERCISES: CPT | Mod: PO

## 2021-09-30 PROCEDURE — 97112 NEUROMUSCULAR REEDUCATION: CPT | Mod: PO

## 2021-10-04 ENCOUNTER — CLINICAL SUPPORT (OUTPATIENT)
Dept: REHABILITATION | Facility: HOSPITAL | Age: 76
End: 2021-10-04
Payer: MEDICARE

## 2021-10-04 DIAGNOSIS — M25.561 CHRONIC PAIN OF RIGHT KNEE: ICD-10-CM

## 2021-10-04 DIAGNOSIS — M25.662 DECREASED RANGE OF MOTION OF LEFT KNEE: ICD-10-CM

## 2021-10-04 DIAGNOSIS — G89.29 CHRONIC PAIN OF RIGHT KNEE: ICD-10-CM

## 2021-10-04 DIAGNOSIS — R26.89 ANTALGIC GAIT: ICD-10-CM

## 2021-10-04 PROCEDURE — 97110 THERAPEUTIC EXERCISES: CPT | Mod: PO

## 2021-10-04 PROCEDURE — 97140 MANUAL THERAPY 1/> REGIONS: CPT | Mod: PO

## 2021-10-05 ENCOUNTER — PATIENT MESSAGE (OUTPATIENT)
Dept: ADMINISTRATIVE | Facility: HOSPITAL | Age: 76
End: 2021-10-05

## 2021-10-07 ENCOUNTER — CLINICAL SUPPORT (OUTPATIENT)
Dept: REHABILITATION | Facility: HOSPITAL | Age: 76
End: 2021-10-07
Payer: MEDICARE

## 2021-10-07 DIAGNOSIS — M25.662 DECREASED RANGE OF MOTION OF LEFT KNEE: ICD-10-CM

## 2021-10-07 DIAGNOSIS — M25.561 CHRONIC PAIN OF RIGHT KNEE: ICD-10-CM

## 2021-10-07 DIAGNOSIS — G89.29 CHRONIC PAIN OF RIGHT KNEE: ICD-10-CM

## 2021-10-07 DIAGNOSIS — R26.89 ANTALGIC GAIT: ICD-10-CM

## 2021-10-07 PROCEDURE — 97110 THERAPEUTIC EXERCISES: CPT | Mod: PO

## 2021-10-07 PROCEDURE — 97140 MANUAL THERAPY 1/> REGIONS: CPT | Mod: PO

## 2021-10-14 ENCOUNTER — PATIENT MESSAGE (OUTPATIENT)
Dept: CARDIOLOGY | Facility: CLINIC | Age: 76
End: 2021-10-14
Payer: MEDICARE

## 2021-10-14 ENCOUNTER — PATIENT MESSAGE (OUTPATIENT)
Dept: NUTRITION | Facility: CLINIC | Age: 76
End: 2021-10-14
Payer: MEDICARE

## 2021-10-17 ENCOUNTER — CLINICAL SUPPORT (OUTPATIENT)
Dept: URGENT CARE | Facility: CLINIC | Age: 76
End: 2021-10-17
Payer: MEDICARE

## 2021-10-17 DIAGNOSIS — Z11.59 SCREENING FOR VIRAL DISEASE: Primary | ICD-10-CM

## 2021-10-17 DIAGNOSIS — Z01.818 PRE-OP TESTING: ICD-10-CM

## 2021-10-17 LAB
CTP QC/QA: YES
SARS-COV-2 RDRP RESP QL NAA+PROBE: NEGATIVE

## 2021-10-17 PROCEDURE — U0005 INFEC AGEN DETEC AMPLI PROBE: HCPCS | Performed by: INTERNAL MEDICINE

## 2021-10-17 PROCEDURE — U0003 INFECTIOUS AGENT DETECTION BY NUCLEIC ACID (DNA OR RNA); SEVERE ACUTE RESPIRATORY SYNDROME CORONAVIRUS 2 (SARS-COV-2) (CORONAVIRUS DISEASE [COVID-19]), AMPLIFIED PROBE TECHNIQUE, MAKING USE OF HIGH THROUGHPUT TECHNOLOGIES AS DESCRIBED BY CMS-2020-01-R: HCPCS | Performed by: INTERNAL MEDICINE

## 2021-10-17 PROCEDURE — U0002: ICD-10-PCS | Mod: QW,S$GLB,, | Performed by: INTERNAL MEDICINE

## 2021-10-17 PROCEDURE — U0002 COVID-19 LAB TEST NON-CDC: HCPCS | Mod: QW,S$GLB,, | Performed by: INTERNAL MEDICINE

## 2021-10-18 ENCOUNTER — PATIENT MESSAGE (OUTPATIENT)
Dept: ADMINISTRATIVE | Facility: HOSPITAL | Age: 76
End: 2021-10-18
Payer: MEDICARE

## 2021-10-18 ENCOUNTER — HOSPITAL ENCOUNTER (OUTPATIENT)
Facility: HOSPITAL | Age: 76
Discharge: HOME | End: 2021-10-18
Attending: INTERNAL MEDICINE | Admitting: INTERNAL MEDICINE
Payer: MEDICARE

## 2021-10-18 VITALS
DIASTOLIC BLOOD PRESSURE: 65 MMHG | WEIGHT: 238 LBS | HEART RATE: 75 BPM | OXYGEN SATURATION: 93 % | TEMPERATURE: 98 F | BODY MASS INDEX: 43.79 KG/M2 | HEIGHT: 62 IN | SYSTOLIC BLOOD PRESSURE: 132 MMHG | RESPIRATION RATE: 20 BRPM

## 2021-10-18 DIAGNOSIS — R94.39 ABNORMAL STRESS TEST: ICD-10-CM

## 2021-10-18 DIAGNOSIS — I25.708 CORONARY ARTERY DISEASE OF BYPASS GRAFT OF NATIVE HEART WITH STABLE ANGINA PECTORIS: Primary | Chronic | ICD-10-CM

## 2021-10-18 LAB
ABO + RH BLD: NORMAL
ANION GAP SERPL CALC-SCNC: 12 MMOL/L (ref 8–16)
BASOPHILS # BLD AUTO: 0.03 K/UL (ref 0–0.2)
BASOPHILS NFR BLD: 0.5 % (ref 0–1.9)
BLD GP AB SCN CELLS X3 SERPL QL: NORMAL
BUN SERPL-MCNC: 38 MG/DL (ref 8–23)
CALCIUM SERPL-MCNC: 8.6 MG/DL (ref 8.7–10.5)
CHLORIDE SERPL-SCNC: 97 MMOL/L (ref 95–110)
CO2 SERPL-SCNC: 24 MMOL/L (ref 23–29)
CREAT SERPL-MCNC: 1.2 MG/DL (ref 0.5–1.4)
DIFFERENTIAL METHOD: ABNORMAL
EOSINOPHIL # BLD AUTO: 0.2 K/UL (ref 0–0.5)
EOSINOPHIL NFR BLD: 3.3 % (ref 0–8)
ERYTHROCYTE [DISTWIDTH] IN BLOOD BY AUTOMATED COUNT: 14.3 % (ref 11.5–14.5)
EST. GFR  (AFRICAN AMERICAN): 51.4 ML/MIN/1.73 M^2
EST. GFR  (NON AFRICAN AMERICAN): 44.6 ML/MIN/1.73 M^2
GLUCOSE SERPL-MCNC: 124 MG/DL (ref 70–110)
HCT VFR BLD AUTO: 33.4 % (ref 37–48.5)
HGB BLD-MCNC: 10.8 G/DL (ref 12–16)
IMM GRANULOCYTES # BLD AUTO: 0.03 K/UL (ref 0–0.04)
IMM GRANULOCYTES NFR BLD AUTO: 0.5 % (ref 0–0.5)
LYMPHOCYTES # BLD AUTO: 1.3 K/UL (ref 1–4.8)
LYMPHOCYTES NFR BLD: 21.1 % (ref 18–48)
MCH RBC QN AUTO: 28.2 PG (ref 27–31)
MCHC RBC AUTO-ENTMCNC: 32.3 G/DL (ref 32–36)
MCV RBC AUTO: 87 FL (ref 82–98)
MONOCYTES # BLD AUTO: 0.5 K/UL (ref 0.3–1)
MONOCYTES NFR BLD: 8.7 % (ref 4–15)
NEUTROPHILS # BLD AUTO: 3.9 K/UL (ref 1.8–7.7)
NEUTROPHILS NFR BLD: 65.9 % (ref 38–73)
NRBC BLD-RTO: 0 /100 WBC
PLATELET # BLD AUTO: 186 K/UL (ref 150–450)
PMV BLD AUTO: 10 FL (ref 9.2–12.9)
POCT GLUCOSE: 148 MG/DL (ref 70–110)
POTASSIUM SERPL-SCNC: 3.5 MMOL/L (ref 3.5–5.1)
RBC # BLD AUTO: 3.83 M/UL (ref 4–5.4)
SARS-COV-2 RNA RESP QL NAA+PROBE: NOT DETECTED
SARS-COV-2- CYCLE NUMBER: NORMAL
SODIUM SERPL-SCNC: 133 MMOL/L (ref 136–145)
WBC # BLD AUTO: 5.98 K/UL (ref 3.9–12.7)

## 2021-10-18 PROCEDURE — 99153 MOD SED SAME PHYS/QHP EA: CPT | Performed by: INTERNAL MEDICINE

## 2021-10-18 PROCEDURE — C1769 GUIDE WIRE: HCPCS | Performed by: INTERNAL MEDICINE

## 2021-10-18 PROCEDURE — 80048 BASIC METABOLIC PNL TOTAL CA: CPT | Performed by: INTERNAL MEDICINE

## 2021-10-18 PROCEDURE — 25000003 PHARM REV CODE 250: Performed by: INTERNAL MEDICINE

## 2021-10-18 PROCEDURE — 82962 GLUCOSE BLOOD TEST: CPT | Performed by: INTERNAL MEDICINE

## 2021-10-18 PROCEDURE — 85025 COMPLETE CBC W/AUTO DIFF WBC: CPT | Performed by: INTERNAL MEDICINE

## 2021-10-18 PROCEDURE — 99152 PR MOD CONSCIOUS SEDATION, SAME PHYS, 5+ YRS, FIRST 15 MIN: ICD-10-PCS | Mod: ,,, | Performed by: INTERNAL MEDICINE

## 2021-10-18 PROCEDURE — 25000003 PHARM REV CODE 250

## 2021-10-18 PROCEDURE — 93455 CORONARY ART/GRFT ANGIO S&I: CPT | Mod: 26,,, | Performed by: INTERNAL MEDICINE

## 2021-10-18 PROCEDURE — C1894 INTRO/SHEATH, NON-LASER: HCPCS | Performed by: INTERNAL MEDICINE

## 2021-10-18 PROCEDURE — 86900 BLOOD TYPING SEROLOGIC ABO: CPT | Performed by: INTERNAL MEDICINE

## 2021-10-18 PROCEDURE — 93455 CORONARY ART/GRFT ANGIO S&I: CPT | Performed by: INTERNAL MEDICINE

## 2021-10-18 PROCEDURE — 63600175 PHARM REV CODE 636 W HCPCS: Performed by: INTERNAL MEDICINE

## 2021-10-18 PROCEDURE — 99152 MOD SED SAME PHYS/QHP 5/>YRS: CPT | Mod: ,,, | Performed by: INTERNAL MEDICINE

## 2021-10-18 PROCEDURE — 25500020 PHARM REV CODE 255: Performed by: INTERNAL MEDICINE

## 2021-10-18 PROCEDURE — 99152 MOD SED SAME PHYS/QHP 5/>YRS: CPT | Performed by: INTERNAL MEDICINE

## 2021-10-18 PROCEDURE — 93455 PR CATH PLACE/CORONARY ANGIO, IMG SUPER/INTERP, BYPASS ANGIO: ICD-10-PCS | Mod: 26,,, | Performed by: INTERNAL MEDICINE

## 2021-10-18 PROCEDURE — 36415 COLL VENOUS BLD VENIPUNCTURE: CPT | Performed by: INTERNAL MEDICINE

## 2021-10-18 RX ORDER — ONDANSETRON 8 MG/1
8 TABLET, ORALLY DISINTEGRATING ORAL EVERY 8 HOURS PRN
Status: DISCONTINUED | OUTPATIENT
Start: 2021-10-18 | End: 2021-10-18 | Stop reason: HOSPADM

## 2021-10-18 RX ORDER — SODIUM CHLORIDE 9 MG/ML
INJECTION, SOLUTION INTRAVENOUS CONTINUOUS
Status: ACTIVE | OUTPATIENT
Start: 2021-10-18 | End: 2021-10-18

## 2021-10-18 RX ORDER — HEPARIN SOD,PORCINE/0.9 % NACL 1000/500ML
INTRAVENOUS SOLUTION INTRAVENOUS
Status: DISCONTINUED | OUTPATIENT
Start: 2021-10-18 | End: 2021-10-18 | Stop reason: HOSPADM

## 2021-10-18 RX ORDER — FENTANYL CITRATE 50 UG/ML
INJECTION, SOLUTION INTRAMUSCULAR; INTRAVENOUS
Status: DISCONTINUED | OUTPATIENT
Start: 2021-10-18 | End: 2021-10-18 | Stop reason: HOSPADM

## 2021-10-18 RX ORDER — LIDOCAINE HYDROCHLORIDE 20 MG/ML
INJECTION, SOLUTION INFILTRATION; PERINEURAL
Status: DISCONTINUED | OUTPATIENT
Start: 2021-10-18 | End: 2021-10-18 | Stop reason: HOSPADM

## 2021-10-18 RX ORDER — ISOSORBIDE MONONITRATE 30 MG/1
30 TABLET, EXTENDED RELEASE ORAL DAILY
Qty: 90 TABLET | Refills: 3 | Status: SHIPPED | OUTPATIENT
Start: 2021-10-18 | End: 2022-06-21 | Stop reason: SDUPTHER

## 2021-10-18 RX ORDER — DIPHENHYDRAMINE HCL 50 MG
50 CAPSULE ORAL ONCE
Status: COMPLETED | OUTPATIENT
Start: 2021-10-18 | End: 2021-10-18

## 2021-10-18 RX ORDER — MIDAZOLAM HYDROCHLORIDE 1 MG/ML
INJECTION, SOLUTION INTRAMUSCULAR; INTRAVENOUS
Status: DISCONTINUED | OUTPATIENT
Start: 2021-10-18 | End: 2021-10-18 | Stop reason: HOSPADM

## 2021-10-18 RX ORDER — ACETAMINOPHEN 325 MG/1
650 TABLET ORAL EVERY 4 HOURS PRN
Status: DISCONTINUED | OUTPATIENT
Start: 2021-10-18 | End: 2021-10-18 | Stop reason: HOSPADM

## 2021-10-18 RX ADMIN — SODIUM CHLORIDE: 0.9 INJECTION, SOLUTION INTRAVENOUS at 08:10

## 2021-10-18 RX ADMIN — DIPHENHYDRAMINE HYDROCHLORIDE 50 MG: 50 CAPSULE ORAL at 08:10

## 2021-10-20 ENCOUNTER — PATIENT MESSAGE (OUTPATIENT)
Dept: ENDOCRINOLOGY | Facility: CLINIC | Age: 76
End: 2021-10-20
Payer: MEDICARE

## 2021-10-21 ENCOUNTER — PATIENT MESSAGE (OUTPATIENT)
Dept: ADMINISTRATIVE | Facility: HOSPITAL | Age: 76
End: 2021-10-21
Payer: MEDICARE

## 2021-10-22 ENCOUNTER — PES CALL (OUTPATIENT)
Dept: ADMINISTRATIVE | Facility: CLINIC | Age: 76
End: 2021-10-22

## 2021-10-26 ENCOUNTER — PATIENT OUTREACH (OUTPATIENT)
Dept: ADMINISTRATIVE | Facility: OTHER | Age: 76
End: 2021-10-26
Payer: MEDICARE

## 2021-10-27 ENCOUNTER — TELEPHONE (OUTPATIENT)
Dept: SPORTS MEDICINE | Facility: CLINIC | Age: 76
End: 2021-10-27
Payer: MEDICARE

## 2021-10-28 ENCOUNTER — OFFICE VISIT (OUTPATIENT)
Dept: CARDIOLOGY | Facility: CLINIC | Age: 76
End: 2021-10-28
Payer: MEDICARE

## 2021-10-28 VITALS
SYSTOLIC BLOOD PRESSURE: 119 MMHG | WEIGHT: 235.44 LBS | DIASTOLIC BLOOD PRESSURE: 69 MMHG | HEIGHT: 63 IN | HEART RATE: 86 BPM | BODY MASS INDEX: 41.71 KG/M2

## 2021-10-28 DIAGNOSIS — G47.33 OSA ON CPAP: ICD-10-CM

## 2021-10-28 DIAGNOSIS — E66.01 MORBID OBESITY WITH BODY MASS INDEX (BMI) OF 40.0 OR HIGHER: ICD-10-CM

## 2021-10-28 DIAGNOSIS — E11.22 TYPE 2 DIABETES MELLITUS WITH STAGE 3 CHRONIC KIDNEY DISEASE AND HYPERTENSION: Chronic | ICD-10-CM

## 2021-10-28 DIAGNOSIS — I50.32 CHRONIC HEART FAILURE WITH PRESERVED EJECTION FRACTION: ICD-10-CM

## 2021-10-28 DIAGNOSIS — I25.708 CORONARY ARTERY DISEASE OF BYPASS GRAFT OF NATIVE HEART WITH STABLE ANGINA PECTORIS: Primary | Chronic | ICD-10-CM

## 2021-10-28 DIAGNOSIS — Z86.73 HISTORY OF TIA (TRANSIENT ISCHEMIC ATTACK): ICD-10-CM

## 2021-10-28 DIAGNOSIS — N18.31 STAGE 3A CHRONIC KIDNEY DISEASE: ICD-10-CM

## 2021-10-28 DIAGNOSIS — I10 ESSENTIAL HYPERTENSION: Chronic | ICD-10-CM

## 2021-10-28 DIAGNOSIS — E78.5 DYSLIPIDEMIA, GOAL LDL BELOW 70: ICD-10-CM

## 2021-10-28 DIAGNOSIS — N18.30 TYPE 2 DIABETES MELLITUS WITH STAGE 3 CHRONIC KIDNEY DISEASE AND HYPERTENSION: Chronic | ICD-10-CM

## 2021-10-28 DIAGNOSIS — Z95.1 S/P CABG (CORONARY ARTERY BYPASS GRAFT): ICD-10-CM

## 2021-10-28 DIAGNOSIS — I12.9 TYPE 2 DIABETES MELLITUS WITH STAGE 3 CHRONIC KIDNEY DISEASE AND HYPERTENSION: Chronic | ICD-10-CM

## 2021-10-28 PROCEDURE — 3066F NEPHROPATHY DOC TX: CPT | Mod: CPTII,S$GLB,, | Performed by: INTERNAL MEDICINE

## 2021-10-28 PROCEDURE — 1159F MED LIST DOCD IN RCRD: CPT | Mod: CPTII,S$GLB,, | Performed by: INTERNAL MEDICINE

## 2021-10-28 PROCEDURE — 99214 PR OFFICE/OUTPT VISIT, EST, LEVL IV, 30-39 MIN: ICD-10-PCS | Mod: S$GLB,,, | Performed by: INTERNAL MEDICINE

## 2021-10-28 PROCEDURE — 3061F NEG MICROALBUMINURIA REV: CPT | Mod: CPTII,S$GLB,, | Performed by: INTERNAL MEDICINE

## 2021-10-28 PROCEDURE — 3066F PR DOCUMENTATION OF TREATMENT FOR NEPHROPATHY: ICD-10-PCS | Mod: CPTII,S$GLB,, | Performed by: INTERNAL MEDICINE

## 2021-10-28 PROCEDURE — 1159F PR MEDICATION LIST DOCUMENTED IN MEDICAL RECORD: ICD-10-PCS | Mod: CPTII,S$GLB,, | Performed by: INTERNAL MEDICINE

## 2021-10-28 PROCEDURE — 1126F AMNT PAIN NOTED NONE PRSNT: CPT | Mod: CPTII,S$GLB,, | Performed by: INTERNAL MEDICINE

## 2021-10-28 PROCEDURE — 1101F PT FALLS ASSESS-DOCD LE1/YR: CPT | Mod: CPTII,S$GLB,, | Performed by: INTERNAL MEDICINE

## 2021-10-28 PROCEDURE — 3078F DIAST BP <80 MM HG: CPT | Mod: CPTII,S$GLB,, | Performed by: INTERNAL MEDICINE

## 2021-10-28 PROCEDURE — 3051F PR MOST RECENT HEMOGLOBIN A1C LEVEL 7.0 - < 8.0%: ICD-10-PCS | Mod: CPTII,S$GLB,, | Performed by: INTERNAL MEDICINE

## 2021-10-28 PROCEDURE — 3008F BODY MASS INDEX DOCD: CPT | Mod: CPTII,S$GLB,, | Performed by: INTERNAL MEDICINE

## 2021-10-28 PROCEDURE — 3288F FALL RISK ASSESSMENT DOCD: CPT | Mod: CPTII,S$GLB,, | Performed by: INTERNAL MEDICINE

## 2021-10-28 PROCEDURE — 3074F PR MOST RECENT SYSTOLIC BLOOD PRESSURE < 130 MM HG: ICD-10-PCS | Mod: CPTII,S$GLB,, | Performed by: INTERNAL MEDICINE

## 2021-10-28 PROCEDURE — 1160F PR REVIEW ALL MEDS BY PRESCRIBER/CLIN PHARMACIST DOCUMENTED: ICD-10-PCS | Mod: CPTII,S$GLB,, | Performed by: INTERNAL MEDICINE

## 2021-10-28 PROCEDURE — 3072F LOW RISK FOR RETINOPATHY: CPT | Mod: CPTII,S$GLB,, | Performed by: INTERNAL MEDICINE

## 2021-10-28 PROCEDURE — 4010F ACE/ARB THERAPY RXD/TAKEN: CPT | Mod: CPTII,S$GLB,, | Performed by: INTERNAL MEDICINE

## 2021-10-28 PROCEDURE — 3051F HG A1C>EQUAL 7.0%<8.0%: CPT | Mod: CPTII,S$GLB,, | Performed by: INTERNAL MEDICINE

## 2021-10-28 PROCEDURE — 99999 PR PBB SHADOW E&M-EST. PATIENT-LVL III: ICD-10-PCS | Mod: PBBFAC,,, | Performed by: INTERNAL MEDICINE

## 2021-10-28 PROCEDURE — 3078F PR MOST RECENT DIASTOLIC BLOOD PRESSURE < 80 MM HG: ICD-10-PCS | Mod: CPTII,S$GLB,, | Performed by: INTERNAL MEDICINE

## 2021-10-28 PROCEDURE — 3008F PR BODY MASS INDEX (BMI) DOCUMENTED: ICD-10-PCS | Mod: CPTII,S$GLB,, | Performed by: INTERNAL MEDICINE

## 2021-10-28 PROCEDURE — 1126F PR PAIN SEVERITY QUANTIFIED, NO PAIN PRESENT: ICD-10-PCS | Mod: CPTII,S$GLB,, | Performed by: INTERNAL MEDICINE

## 2021-10-28 PROCEDURE — 3061F PR NEG MICROALBUMINURIA RESULT DOCUMENTED/REVIEW: ICD-10-PCS | Mod: CPTII,S$GLB,, | Performed by: INTERNAL MEDICINE

## 2021-10-28 PROCEDURE — 99214 OFFICE O/P EST MOD 30 MIN: CPT | Mod: S$GLB,,, | Performed by: INTERNAL MEDICINE

## 2021-10-28 PROCEDURE — 4010F PR ACE/ARB THEARPY RXD/TAKEN: ICD-10-PCS | Mod: CPTII,S$GLB,, | Performed by: INTERNAL MEDICINE

## 2021-10-28 PROCEDURE — 3288F PR FALLS RISK ASSESSMENT DOCUMENTED: ICD-10-PCS | Mod: CPTII,S$GLB,, | Performed by: INTERNAL MEDICINE

## 2021-10-28 PROCEDURE — 3072F PR LOW RISK FOR RETINOPATHY: ICD-10-PCS | Mod: CPTII,S$GLB,, | Performed by: INTERNAL MEDICINE

## 2021-10-28 PROCEDURE — 3074F SYST BP LT 130 MM HG: CPT | Mod: CPTII,S$GLB,, | Performed by: INTERNAL MEDICINE

## 2021-10-28 PROCEDURE — 99999 PR PBB SHADOW E&M-EST. PATIENT-LVL III: CPT | Mod: PBBFAC,,, | Performed by: INTERNAL MEDICINE

## 2021-10-28 PROCEDURE — 1160F RVW MEDS BY RX/DR IN RCRD: CPT | Mod: CPTII,S$GLB,, | Performed by: INTERNAL MEDICINE

## 2021-10-28 PROCEDURE — 1101F PR PT FALLS ASSESS DOC 0-1 FALLS W/OUT INJ PAST YR: ICD-10-PCS | Mod: CPTII,S$GLB,, | Performed by: INTERNAL MEDICINE

## 2021-10-29 ENCOUNTER — PATIENT MESSAGE (OUTPATIENT)
Dept: ENDOCRINOLOGY | Facility: CLINIC | Age: 76
End: 2021-10-29
Payer: MEDICARE

## 2021-10-29 DIAGNOSIS — I12.9 TYPE 2 DIABETES MELLITUS WITH STAGE 3 CHRONIC KIDNEY DISEASE AND HYPERTENSION: Primary | ICD-10-CM

## 2021-10-29 DIAGNOSIS — N18.30 TYPE 2 DIABETES MELLITUS WITH STAGE 3 CHRONIC KIDNEY DISEASE AND HYPERTENSION: Primary | ICD-10-CM

## 2021-10-29 DIAGNOSIS — E83.52 HYPERCALCEMIA: ICD-10-CM

## 2021-10-29 DIAGNOSIS — E11.22 TYPE 2 DIABETES MELLITUS WITH STAGE 3 CHRONIC KIDNEY DISEASE AND HYPERTENSION: Primary | ICD-10-CM

## 2021-11-01 ENCOUNTER — CLINICAL SUPPORT (OUTPATIENT)
Dept: REHABILITATION | Facility: HOSPITAL | Age: 76
End: 2021-11-01
Payer: MEDICARE

## 2021-11-01 DIAGNOSIS — G89.29 CHRONIC PAIN OF RIGHT KNEE: ICD-10-CM

## 2021-11-01 DIAGNOSIS — M25.662 DECREASED RANGE OF MOTION OF LEFT KNEE: ICD-10-CM

## 2021-11-01 DIAGNOSIS — M25.561 CHRONIC PAIN OF RIGHT KNEE: ICD-10-CM

## 2021-11-01 DIAGNOSIS — R26.89 ANTALGIC GAIT: ICD-10-CM

## 2021-11-01 PROCEDURE — 97110 THERAPEUTIC EXERCISES: CPT | Mod: PO

## 2021-11-01 PROCEDURE — 97140 MANUAL THERAPY 1/> REGIONS: CPT | Mod: PO

## 2021-11-02 ENCOUNTER — TELEPHONE (OUTPATIENT)
Dept: ENDOCRINOLOGY | Facility: CLINIC | Age: 76
End: 2021-11-02
Payer: MEDICARE

## 2021-11-03 ENCOUNTER — OFFICE VISIT (OUTPATIENT)
Dept: SPORTS MEDICINE | Facility: CLINIC | Age: 76
End: 2021-11-03
Payer: MEDICARE

## 2021-11-03 VITALS
WEIGHT: 233.69 LBS | DIASTOLIC BLOOD PRESSURE: 70 MMHG | SYSTOLIC BLOOD PRESSURE: 107 MMHG | BODY MASS INDEX: 41.41 KG/M2 | HEIGHT: 63 IN

## 2021-11-03 DIAGNOSIS — M25.561 CHRONIC PAIN OF RIGHT KNEE: ICD-10-CM

## 2021-11-03 DIAGNOSIS — I25.708 CORONARY ARTERY DISEASE OF BYPASS GRAFT OF NATIVE HEART WITH STABLE ANGINA PECTORIS: ICD-10-CM

## 2021-11-03 DIAGNOSIS — Z71.89 COUNSELING ON HEALTH PROMOTION AND DISEASE PREVENTION: Primary | ICD-10-CM

## 2021-11-03 DIAGNOSIS — G89.29 CHRONIC PAIN OF RIGHT KNEE: ICD-10-CM

## 2021-11-03 DIAGNOSIS — E66.01 CLASS 3 SEVERE OBESITY WITH BODY MASS INDEX (BMI) OF 40.0 TO 44.9 IN ADULT, UNSPECIFIED OBESITY TYPE, UNSPECIFIED WHETHER SERIOUS COMORBIDITY PRESENT: ICD-10-CM

## 2021-11-03 PROCEDURE — 3078F PR MOST RECENT DIASTOLIC BLOOD PRESSURE < 80 MM HG: ICD-10-PCS | Mod: CPTII,S$GLB,, | Performed by: STUDENT IN AN ORGANIZED HEALTH CARE EDUCATION/TRAINING PROGRAM

## 2021-11-03 PROCEDURE — 3051F PR MOST RECENT HEMOGLOBIN A1C LEVEL 7.0 - < 8.0%: ICD-10-PCS | Mod: CPTII,S$GLB,, | Performed by: STUDENT IN AN ORGANIZED HEALTH CARE EDUCATION/TRAINING PROGRAM

## 2021-11-03 PROCEDURE — 3074F SYST BP LT 130 MM HG: CPT | Mod: CPTII,S$GLB,, | Performed by: STUDENT IN AN ORGANIZED HEALTH CARE EDUCATION/TRAINING PROGRAM

## 2021-11-03 PROCEDURE — 99215 PR OFFICE/OUTPT VISIT, EST, LEVL V, 40-54 MIN: ICD-10-PCS | Mod: S$GLB,,, | Performed by: STUDENT IN AN ORGANIZED HEALTH CARE EDUCATION/TRAINING PROGRAM

## 2021-11-03 PROCEDURE — 1159F PR MEDICATION LIST DOCUMENTED IN MEDICAL RECORD: ICD-10-PCS | Mod: CPTII,S$GLB,, | Performed by: STUDENT IN AN ORGANIZED HEALTH CARE EDUCATION/TRAINING PROGRAM

## 2021-11-03 PROCEDURE — 1160F RVW MEDS BY RX/DR IN RCRD: CPT | Mod: CPTII,S$GLB,, | Performed by: STUDENT IN AN ORGANIZED HEALTH CARE EDUCATION/TRAINING PROGRAM

## 2021-11-03 PROCEDURE — 1160F PR REVIEW ALL MEDS BY PRESCRIBER/CLIN PHARMACIST DOCUMENTED: ICD-10-PCS | Mod: CPTII,S$GLB,, | Performed by: STUDENT IN AN ORGANIZED HEALTH CARE EDUCATION/TRAINING PROGRAM

## 2021-11-03 PROCEDURE — 3066F NEPHROPATHY DOC TX: CPT | Mod: CPTII,S$GLB,, | Performed by: STUDENT IN AN ORGANIZED HEALTH CARE EDUCATION/TRAINING PROGRAM

## 2021-11-03 PROCEDURE — 3066F PR DOCUMENTATION OF TREATMENT FOR NEPHROPATHY: ICD-10-PCS | Mod: CPTII,S$GLB,, | Performed by: STUDENT IN AN ORGANIZED HEALTH CARE EDUCATION/TRAINING PROGRAM

## 2021-11-03 PROCEDURE — 4010F ACE/ARB THERAPY RXD/TAKEN: CPT | Mod: CPTII,S$GLB,, | Performed by: STUDENT IN AN ORGANIZED HEALTH CARE EDUCATION/TRAINING PROGRAM

## 2021-11-03 PROCEDURE — 3008F PR BODY MASS INDEX (BMI) DOCUMENTED: ICD-10-PCS | Mod: CPTII,S$GLB,, | Performed by: STUDENT IN AN ORGANIZED HEALTH CARE EDUCATION/TRAINING PROGRAM

## 2021-11-03 PROCEDURE — 3074F PR MOST RECENT SYSTOLIC BLOOD PRESSURE < 130 MM HG: ICD-10-PCS | Mod: CPTII,S$GLB,, | Performed by: STUDENT IN AN ORGANIZED HEALTH CARE EDUCATION/TRAINING PROGRAM

## 2021-11-03 PROCEDURE — 1159F MED LIST DOCD IN RCRD: CPT | Mod: CPTII,S$GLB,, | Performed by: STUDENT IN AN ORGANIZED HEALTH CARE EDUCATION/TRAINING PROGRAM

## 2021-11-03 PROCEDURE — 3072F PR LOW RISK FOR RETINOPATHY: ICD-10-PCS | Mod: CPTII,S$GLB,, | Performed by: STUDENT IN AN ORGANIZED HEALTH CARE EDUCATION/TRAINING PROGRAM

## 2021-11-03 PROCEDURE — 99215 OFFICE O/P EST HI 40 MIN: CPT | Mod: S$GLB,,, | Performed by: STUDENT IN AN ORGANIZED HEALTH CARE EDUCATION/TRAINING PROGRAM

## 2021-11-03 PROCEDURE — 3061F PR NEG MICROALBUMINURIA RESULT DOCUMENTED/REVIEW: ICD-10-PCS | Mod: CPTII,S$GLB,, | Performed by: STUDENT IN AN ORGANIZED HEALTH CARE EDUCATION/TRAINING PROGRAM

## 2021-11-03 PROCEDURE — 3061F NEG MICROALBUMINURIA REV: CPT | Mod: CPTII,S$GLB,, | Performed by: STUDENT IN AN ORGANIZED HEALTH CARE EDUCATION/TRAINING PROGRAM

## 2021-11-03 PROCEDURE — 4010F PR ACE/ARB THEARPY RXD/TAKEN: ICD-10-PCS | Mod: CPTII,S$GLB,, | Performed by: STUDENT IN AN ORGANIZED HEALTH CARE EDUCATION/TRAINING PROGRAM

## 2021-11-03 PROCEDURE — 99999 PR PBB SHADOW E&M-EST. PATIENT-LVL IV: ICD-10-PCS | Mod: PBBFAC,,, | Performed by: STUDENT IN AN ORGANIZED HEALTH CARE EDUCATION/TRAINING PROGRAM

## 2021-11-03 PROCEDURE — 3072F LOW RISK FOR RETINOPATHY: CPT | Mod: CPTII,S$GLB,, | Performed by: STUDENT IN AN ORGANIZED HEALTH CARE EDUCATION/TRAINING PROGRAM

## 2021-11-03 PROCEDURE — 3078F DIAST BP <80 MM HG: CPT | Mod: CPTII,S$GLB,, | Performed by: STUDENT IN AN ORGANIZED HEALTH CARE EDUCATION/TRAINING PROGRAM

## 2021-11-03 PROCEDURE — 3051F HG A1C>EQUAL 7.0%<8.0%: CPT | Mod: CPTII,S$GLB,, | Performed by: STUDENT IN AN ORGANIZED HEALTH CARE EDUCATION/TRAINING PROGRAM

## 2021-11-03 PROCEDURE — 99999 PR PBB SHADOW E&M-EST. PATIENT-LVL IV: CPT | Mod: PBBFAC,,, | Performed by: STUDENT IN AN ORGANIZED HEALTH CARE EDUCATION/TRAINING PROGRAM

## 2021-11-03 PROCEDURE — 3008F BODY MASS INDEX DOCD: CPT | Mod: CPTII,S$GLB,, | Performed by: STUDENT IN AN ORGANIZED HEALTH CARE EDUCATION/TRAINING PROGRAM

## 2021-11-04 ENCOUNTER — TELEPHONE (OUTPATIENT)
Dept: REHABILITATION | Facility: HOSPITAL | Age: 76
End: 2021-11-04
Payer: MEDICARE

## 2021-11-05 DIAGNOSIS — N18.30 TYPE 2 DIABETES MELLITUS WITH STAGE 3 CHRONIC KIDNEY DISEASE AND HYPERTENSION: Primary | ICD-10-CM

## 2021-11-05 DIAGNOSIS — E11.22 TYPE 2 DIABETES MELLITUS WITH STAGE 3 CHRONIC KIDNEY DISEASE AND HYPERTENSION: Primary | ICD-10-CM

## 2021-11-05 DIAGNOSIS — I12.9 TYPE 2 DIABETES MELLITUS WITH STAGE 3 CHRONIC KIDNEY DISEASE AND HYPERTENSION: Primary | ICD-10-CM

## 2021-11-08 ENCOUNTER — CLINICAL SUPPORT (OUTPATIENT)
Dept: REHABILITATION | Facility: HOSPITAL | Age: 76
End: 2021-11-08
Payer: MEDICARE

## 2021-11-08 DIAGNOSIS — R26.89 ANTALGIC GAIT: ICD-10-CM

## 2021-11-08 DIAGNOSIS — M25.662 DECREASED RANGE OF MOTION OF LEFT KNEE: ICD-10-CM

## 2021-11-08 DIAGNOSIS — G89.29 CHRONIC PAIN OF RIGHT KNEE: ICD-10-CM

## 2021-11-08 DIAGNOSIS — M25.561 CHRONIC PAIN OF RIGHT KNEE: ICD-10-CM

## 2021-11-08 PROCEDURE — 97112 NEUROMUSCULAR REEDUCATION: CPT | Mod: PO

## 2021-11-08 PROCEDURE — 97110 THERAPEUTIC EXERCISES: CPT | Mod: PO

## 2021-11-10 ENCOUNTER — PATIENT MESSAGE (OUTPATIENT)
Dept: ENDOCRINOLOGY | Facility: CLINIC | Age: 76
End: 2021-11-10
Payer: MEDICARE

## 2021-11-11 ENCOUNTER — CLINICAL SUPPORT (OUTPATIENT)
Dept: REHABILITATION | Facility: HOSPITAL | Age: 76
End: 2021-11-11
Payer: MEDICARE

## 2021-11-11 DIAGNOSIS — R26.89 ANTALGIC GAIT: ICD-10-CM

## 2021-11-11 DIAGNOSIS — M25.561 CHRONIC PAIN OF RIGHT KNEE: ICD-10-CM

## 2021-11-11 DIAGNOSIS — G89.29 CHRONIC PAIN OF RIGHT KNEE: ICD-10-CM

## 2021-11-11 DIAGNOSIS — M25.662 DECREASED RANGE OF MOTION OF LEFT KNEE: ICD-10-CM

## 2021-11-11 PROCEDURE — 97110 THERAPEUTIC EXERCISES: CPT | Mod: PO

## 2021-11-11 PROCEDURE — 97112 NEUROMUSCULAR REEDUCATION: CPT | Mod: PO

## 2021-11-15 ENCOUNTER — LAB VISIT (OUTPATIENT)
Dept: LAB | Facility: HOSPITAL | Age: 76
End: 2021-11-15
Payer: MEDICARE

## 2021-11-15 ENCOUNTER — CLINICAL SUPPORT (OUTPATIENT)
Dept: DIABETES | Facility: CLINIC | Age: 76
End: 2021-11-15
Payer: MEDICARE

## 2021-11-15 ENCOUNTER — PATIENT MESSAGE (OUTPATIENT)
Dept: ENDOCRINOLOGY | Facility: CLINIC | Age: 76
End: 2021-11-15
Payer: MEDICARE

## 2021-11-15 ENCOUNTER — IMMUNIZATION (OUTPATIENT)
Dept: PHARMACY | Facility: CLINIC | Age: 76
End: 2021-11-15
Payer: MEDICARE

## 2021-11-15 DIAGNOSIS — E11.22 TYPE 2 DIABETES MELLITUS WITH STAGE 3 CHRONIC KIDNEY DISEASE AND HYPERTENSION: ICD-10-CM

## 2021-11-15 DIAGNOSIS — E11.22 TYPE 2 DIABETES MELLITUS WITH STAGE 3 CHRONIC KIDNEY DISEASE AND HYPERTENSION: Chronic | ICD-10-CM

## 2021-11-15 DIAGNOSIS — I12.9 TYPE 2 DIABETES MELLITUS WITH STAGE 3 CHRONIC KIDNEY DISEASE AND HYPERTENSION: Chronic | ICD-10-CM

## 2021-11-15 DIAGNOSIS — Z23 NEED FOR VACCINATION: Primary | ICD-10-CM

## 2021-11-15 DIAGNOSIS — I12.9 TYPE 2 DIABETES MELLITUS WITH STAGE 3 CHRONIC KIDNEY DISEASE AND HYPERTENSION: ICD-10-CM

## 2021-11-15 DIAGNOSIS — N18.30 TYPE 2 DIABETES MELLITUS WITH STAGE 3 CHRONIC KIDNEY DISEASE AND HYPERTENSION: ICD-10-CM

## 2021-11-15 DIAGNOSIS — N18.30 TYPE 2 DIABETES MELLITUS WITH STAGE 3 CHRONIC KIDNEY DISEASE AND HYPERTENSION: Chronic | ICD-10-CM

## 2021-11-15 LAB
ALBUMIN SERPL BCP-MCNC: 3.5 G/DL (ref 3.5–5.2)
ANION GAP SERPL CALC-SCNC: 12 MMOL/L (ref 8–16)
BUN SERPL-MCNC: 66 MG/DL (ref 8–23)
CALCIUM SERPL-MCNC: 10.8 MG/DL (ref 8.7–10.5)
CHLORIDE SERPL-SCNC: 95 MMOL/L (ref 95–110)
CO2 SERPL-SCNC: 29 MMOL/L (ref 23–29)
CREAT SERPL-MCNC: 2.4 MG/DL (ref 0.5–1.4)
EST. GFR  (AFRICAN AMERICAN): 22.3 ML/MIN/1.73 M^2
EST. GFR  (NON AFRICAN AMERICAN): 19.3 ML/MIN/1.73 M^2
GLUCOSE SERPL-MCNC: 137 MG/DL (ref 70–110)
PHOSPHATE SERPL-MCNC: 4.5 MG/DL (ref 2.7–4.5)
POTASSIUM SERPL-SCNC: 4.4 MMOL/L (ref 3.5–5.1)
SODIUM SERPL-SCNC: 136 MMOL/L (ref 136–145)

## 2021-11-15 PROCEDURE — G0108 DIAB MANAGE TRN  PER INDIV: HCPCS | Mod: S$GLB,,, | Performed by: DIETITIAN, REGISTERED

## 2021-11-15 PROCEDURE — 36415 COLL VENOUS BLD VENIPUNCTURE: CPT | Performed by: NURSE PRACTITIONER

## 2021-11-15 PROCEDURE — G0108 PR DIAB MANAGE TRN  PER INDIV: ICD-10-PCS | Mod: S$GLB,,, | Performed by: DIETITIAN, REGISTERED

## 2021-11-15 PROCEDURE — 80069 RENAL FUNCTION PANEL: CPT | Performed by: NURSE PRACTITIONER

## 2021-11-16 ENCOUNTER — CLINICAL SUPPORT (OUTPATIENT)
Dept: REHABILITATION | Facility: HOSPITAL | Age: 76
End: 2021-11-16
Payer: MEDICARE

## 2021-11-16 DIAGNOSIS — M25.561 CHRONIC PAIN OF RIGHT KNEE: ICD-10-CM

## 2021-11-16 DIAGNOSIS — R26.89 ANTALGIC GAIT: ICD-10-CM

## 2021-11-16 DIAGNOSIS — G89.29 CHRONIC PAIN OF RIGHT KNEE: ICD-10-CM

## 2021-11-16 DIAGNOSIS — M25.662 DECREASED RANGE OF MOTION OF LEFT KNEE: ICD-10-CM

## 2021-11-16 PROCEDURE — 97140 MANUAL THERAPY 1/> REGIONS: CPT | Mod: KX,PO,CQ

## 2021-11-16 PROCEDURE — 97110 THERAPEUTIC EXERCISES: CPT | Mod: KX,PO,CQ

## 2021-11-17 ENCOUNTER — PATIENT MESSAGE (OUTPATIENT)
Dept: ENDOCRINOLOGY | Facility: CLINIC | Age: 76
End: 2021-11-17
Payer: MEDICARE

## 2021-11-18 ENCOUNTER — CLINICAL SUPPORT (OUTPATIENT)
Dept: REHABILITATION | Facility: HOSPITAL | Age: 76
End: 2021-11-18
Payer: MEDICARE

## 2021-11-18 DIAGNOSIS — G89.29 CHRONIC PAIN OF RIGHT KNEE: ICD-10-CM

## 2021-11-18 DIAGNOSIS — R26.89 ANTALGIC GAIT: ICD-10-CM

## 2021-11-18 DIAGNOSIS — M25.561 CHRONIC PAIN OF RIGHT KNEE: ICD-10-CM

## 2021-11-18 DIAGNOSIS — M25.662 DECREASED RANGE OF MOTION OF LEFT KNEE: ICD-10-CM

## 2021-11-18 PROCEDURE — 97110 THERAPEUTIC EXERCISES: CPT | Mod: PO

## 2021-11-22 ENCOUNTER — CLINICAL SUPPORT (OUTPATIENT)
Dept: REHABILITATION | Facility: HOSPITAL | Age: 76
End: 2021-11-22
Payer: MEDICARE

## 2021-11-22 DIAGNOSIS — M25.662 DECREASED RANGE OF MOTION OF LEFT KNEE: ICD-10-CM

## 2021-11-22 DIAGNOSIS — M25.561 CHRONIC PAIN OF RIGHT KNEE: ICD-10-CM

## 2021-11-22 DIAGNOSIS — R26.89 ANTALGIC GAIT: ICD-10-CM

## 2021-11-22 DIAGNOSIS — G89.29 CHRONIC PAIN OF RIGHT KNEE: ICD-10-CM

## 2021-11-22 PROCEDURE — 97110 THERAPEUTIC EXERCISES: CPT | Mod: PO

## 2021-11-26 ENCOUNTER — LAB VISIT (OUTPATIENT)
Dept: LAB | Facility: HOSPITAL | Age: 76
End: 2021-11-26
Attending: NURSE PRACTITIONER
Payer: MEDICARE

## 2021-11-26 ENCOUNTER — OFFICE VISIT (OUTPATIENT)
Dept: PRIMARY CARE CLINIC | Facility: CLINIC | Age: 76
End: 2021-11-26
Payer: MEDICARE

## 2021-11-26 VITALS
HEART RATE: 86 BPM | WEIGHT: 238.13 LBS | TEMPERATURE: 98 F | BODY MASS INDEX: 42.19 KG/M2 | OXYGEN SATURATION: 99 % | SYSTOLIC BLOOD PRESSURE: 138 MMHG | DIASTOLIC BLOOD PRESSURE: 66 MMHG | HEIGHT: 63 IN

## 2021-11-26 DIAGNOSIS — I25.118 CORONARY ARTERY DISEASE OF NATIVE ARTERY OF NATIVE HEART WITH STABLE ANGINA PECTORIS: ICD-10-CM

## 2021-11-26 DIAGNOSIS — Z79.4 TYPE 2 DIABETES MELLITUS WITH STAGE 3A CHRONIC KIDNEY DISEASE, WITH LONG-TERM CURRENT USE OF INSULIN: ICD-10-CM

## 2021-11-26 DIAGNOSIS — E11.22 TYPE 2 DIABETES MELLITUS WITH STAGE 3A CHRONIC KIDNEY DISEASE, WITH LONG-TERM CURRENT USE OF INSULIN: ICD-10-CM

## 2021-11-26 DIAGNOSIS — E83.52 HYPERCALCEMIA: ICD-10-CM

## 2021-11-26 DIAGNOSIS — I12.9 TYPE 2 DIABETES MELLITUS WITH STAGE 3 CHRONIC KIDNEY DISEASE AND HYPERTENSION: Chronic | ICD-10-CM

## 2021-11-26 DIAGNOSIS — N18.30 TYPE 2 DIABETES MELLITUS WITH STAGE 3 CHRONIC KIDNEY DISEASE AND HYPERTENSION: Chronic | ICD-10-CM

## 2021-11-26 DIAGNOSIS — K58.0 IRRITABLE BOWEL SYNDROME WITH DIARRHEA: ICD-10-CM

## 2021-11-26 DIAGNOSIS — I11.9 HYPERTENSIVE HEART DISEASE WITHOUT HEART FAILURE: Primary | ICD-10-CM

## 2021-11-26 DIAGNOSIS — Z23 INFLUENZA VACCINE NEEDED: ICD-10-CM

## 2021-11-26 DIAGNOSIS — Z23 NEED FOR SHINGLES VACCINE: ICD-10-CM

## 2021-11-26 DIAGNOSIS — E11.22 TYPE 2 DIABETES MELLITUS WITH STAGE 3 CHRONIC KIDNEY DISEASE AND HYPERTENSION: Chronic | ICD-10-CM

## 2021-11-26 DIAGNOSIS — F41.9 ANXIETY, MILD: ICD-10-CM

## 2021-11-26 DIAGNOSIS — M81.0 OSTEOPOROSIS WITHOUT CURRENT PATHOLOGICAL FRACTURE, UNSPECIFIED OSTEOPOROSIS TYPE: ICD-10-CM

## 2021-11-26 DIAGNOSIS — N18.31 TYPE 2 DIABETES MELLITUS WITH STAGE 3A CHRONIC KIDNEY DISEASE, WITH LONG-TERM CURRENT USE OF INSULIN: ICD-10-CM

## 2021-11-26 LAB
25(OH)D3+25(OH)D2 SERPL-MCNC: 66 NG/ML (ref 30–96)
ALBUMIN SERPL BCP-MCNC: 3.3 G/DL (ref 3.5–5.2)
ALBUMIN SERPL BCP-MCNC: 3.3 G/DL (ref 3.5–5.2)
ALP SERPL-CCNC: 61 U/L (ref 55–135)
ALT SERPL W/O P-5'-P-CCNC: 19 U/L (ref 10–44)
ANION GAP SERPL CALC-SCNC: 10 MMOL/L (ref 8–16)
ANION GAP SERPL CALC-SCNC: 10 MMOL/L (ref 8–16)
AST SERPL-CCNC: 24 U/L (ref 10–40)
BILIRUB SERPL-MCNC: 0.6 MG/DL (ref 0.1–1)
BUN SERPL-MCNC: 23 MG/DL (ref 8–23)
BUN SERPL-MCNC: 23 MG/DL (ref 8–23)
CALCIUM SERPL-MCNC: 10.6 MG/DL (ref 8.7–10.5)
CALCIUM SERPL-MCNC: 10.6 MG/DL (ref 8.7–10.5)
CHLORIDE SERPL-SCNC: 104 MMOL/L (ref 95–110)
CHLORIDE SERPL-SCNC: 104 MMOL/L (ref 95–110)
CO2 SERPL-SCNC: 25 MMOL/L (ref 23–29)
CO2 SERPL-SCNC: 25 MMOL/L (ref 23–29)
CREAT SERPL-MCNC: 1.4 MG/DL (ref 0.5–1.4)
CREAT SERPL-MCNC: 1.4 MG/DL (ref 0.5–1.4)
EST. GFR  (AFRICAN AMERICAN): 42.7 ML/MIN/1.73 M^2
EST. GFR  (AFRICAN AMERICAN): 42.7 ML/MIN/1.73 M^2
EST. GFR  (NON AFRICAN AMERICAN): 37 ML/MIN/1.73 M^2
EST. GFR  (NON AFRICAN AMERICAN): 37 ML/MIN/1.73 M^2
ESTIMATED AVG GLUCOSE: 166 MG/DL (ref 68–131)
GLUCOSE SERPL-MCNC: 83 MG/DL (ref 70–110)
GLUCOSE SERPL-MCNC: 83 MG/DL (ref 70–110)
HBA1C MFR BLD: 7.4 % (ref 4–5.6)
MAGNESIUM SERPL-MCNC: 1.4 MG/DL (ref 1.6–2.6)
PHOSPHATE SERPL-MCNC: 2.6 MG/DL (ref 2.7–4.5)
POTASSIUM SERPL-SCNC: 4.3 MMOL/L (ref 3.5–5.1)
POTASSIUM SERPL-SCNC: 4.3 MMOL/L (ref 3.5–5.1)
PROT SERPL-MCNC: 8 G/DL (ref 6–8.4)
PTH-INTACT SERPL-MCNC: 45.2 PG/ML (ref 9–77)
SODIUM SERPL-SCNC: 139 MMOL/L (ref 136–145)
SODIUM SERPL-SCNC: 139 MMOL/L (ref 136–145)

## 2021-11-26 PROCEDURE — 36415 COLL VENOUS BLD VENIPUNCTURE: CPT | Mod: PN | Performed by: NURSE PRACTITIONER

## 2021-11-26 PROCEDURE — 90472 IMMUNIZATION ADMIN EACH ADD: CPT | Mod: S$GLB,,, | Performed by: INTERNAL MEDICINE

## 2021-11-26 PROCEDURE — 4010F ACE/ARB THERAPY RXD/TAKEN: CPT | Mod: CPTII,S$GLB,, | Performed by: INTERNAL MEDICINE

## 2021-11-26 PROCEDURE — 99214 OFFICE O/P EST MOD 30 MIN: CPT | Mod: 25,S$GLB,, | Performed by: INTERNAL MEDICINE

## 2021-11-26 PROCEDURE — 90694 FLU VACCINE - QUADRIVALENT - ADJUVANTED: ICD-10-PCS | Mod: S$GLB,,, | Performed by: INTERNAL MEDICINE

## 2021-11-26 PROCEDURE — 90750 HZV VACC RECOMBINANT IM: CPT | Mod: S$GLB,,, | Performed by: INTERNAL MEDICINE

## 2021-11-26 PROCEDURE — 3066F NEPHROPATHY DOC TX: CPT | Mod: CPTII,S$GLB,, | Performed by: INTERNAL MEDICINE

## 2021-11-26 PROCEDURE — 90694 VACC AIIV4 NO PRSRV 0.5ML IM: CPT | Mod: S$GLB,,, | Performed by: INTERNAL MEDICINE

## 2021-11-26 PROCEDURE — 99999 PR PBB SHADOW E&M-EST. PATIENT-LVL V: ICD-10-PCS | Mod: PBBFAC,,, | Performed by: INTERNAL MEDICINE

## 2021-11-26 PROCEDURE — 3061F NEG MICROALBUMINURIA REV: CPT | Mod: CPTII,S$GLB,, | Performed by: INTERNAL MEDICINE

## 2021-11-26 PROCEDURE — 3072F LOW RISK FOR RETINOPATHY: CPT | Mod: CPTII,S$GLB,, | Performed by: INTERNAL MEDICINE

## 2021-11-26 PROCEDURE — 80053 COMPREHEN METABOLIC PANEL: CPT | Performed by: NURSE PRACTITIONER

## 2021-11-26 PROCEDURE — 84100 ASSAY OF PHOSPHORUS: CPT | Performed by: NURSE PRACTITIONER

## 2021-11-26 PROCEDURE — 82306 VITAMIN D 25 HYDROXY: CPT | Performed by: NURSE PRACTITIONER

## 2021-11-26 PROCEDURE — 3061F PR NEG MICROALBUMINURIA RESULT DOCUMENTED/REVIEW: ICD-10-PCS | Mod: CPTII,S$GLB,, | Performed by: INTERNAL MEDICINE

## 2021-11-26 PROCEDURE — 83970 ASSAY OF PARATHORMONE: CPT | Performed by: NURSE PRACTITIONER

## 2021-11-26 PROCEDURE — 99214 PR OFFICE/OUTPT VISIT, EST, LEVL IV, 30-39 MIN: ICD-10-PCS | Mod: 25,S$GLB,, | Performed by: INTERNAL MEDICINE

## 2021-11-26 PROCEDURE — 3072F PR LOW RISK FOR RETINOPATHY: ICD-10-PCS | Mod: CPTII,S$GLB,, | Performed by: INTERNAL MEDICINE

## 2021-11-26 PROCEDURE — G0008 ADMIN INFLUENZA VIRUS VAC: HCPCS | Mod: S$GLB,,, | Performed by: INTERNAL MEDICINE

## 2021-11-26 PROCEDURE — 4010F PR ACE/ARB THEARPY RXD/TAKEN: ICD-10-PCS | Mod: CPTII,S$GLB,, | Performed by: INTERNAL MEDICINE

## 2021-11-26 PROCEDURE — G0008 FLU VACCINE - QUADRIVALENT - ADJUVANTED: ICD-10-PCS | Mod: S$GLB,,, | Performed by: INTERNAL MEDICINE

## 2021-11-26 PROCEDURE — 3066F PR DOCUMENTATION OF TREATMENT FOR NEPHROPATHY: ICD-10-PCS | Mod: CPTII,S$GLB,, | Performed by: INTERNAL MEDICINE

## 2021-11-26 PROCEDURE — 99999 PR PBB SHADOW E&M-EST. PATIENT-LVL V: CPT | Mod: PBBFAC,,, | Performed by: INTERNAL MEDICINE

## 2021-11-26 PROCEDURE — 83036 HEMOGLOBIN GLYCOSYLATED A1C: CPT | Performed by: NURSE PRACTITIONER

## 2021-11-26 PROCEDURE — 90472 ZOSTER RECOMBINANT VACCINE: ICD-10-PCS | Mod: S$GLB,,, | Performed by: INTERNAL MEDICINE

## 2021-11-26 PROCEDURE — 90750 ZOSTER RECOMBINANT VACCINE: ICD-10-PCS | Mod: S$GLB,,, | Performed by: INTERNAL MEDICINE

## 2021-11-26 PROCEDURE — 83735 ASSAY OF MAGNESIUM: CPT | Performed by: NURSE PRACTITIONER

## 2021-11-26 RX ORDER — DIPHENOXYLATE HYDROCHLORIDE AND ATROPINE SULFATE 2.5; .025 MG/1; MG/1
1 TABLET ORAL 4 TIMES DAILY PRN
Qty: 30 TABLET | Refills: 0 | Status: SHIPPED | OUTPATIENT
Start: 2021-11-26 | End: 2022-03-22 | Stop reason: SDUPTHER

## 2021-11-28 RX ORDER — IRBESARTAN 300 MG/1
300 TABLET ORAL NIGHTLY
Qty: 90 TABLET | Refills: 1 | Status: SHIPPED | OUTPATIENT
Start: 2021-11-28 | End: 2022-03-20

## 2021-11-28 RX ORDER — ATORVASTATIN CALCIUM 80 MG/1
80 TABLET, FILM COATED ORAL DAILY
Qty: 90 TABLET | Refills: 1 | Status: SHIPPED | OUTPATIENT
Start: 2021-11-28 | End: 2022-06-27 | Stop reason: SDUPTHER

## 2021-11-28 RX ORDER — CARVEDILOL 25 MG/1
25 TABLET ORAL 2 TIMES DAILY WITH MEALS
Qty: 180 TABLET | Refills: 1 | Status: SHIPPED | OUTPATIENT
Start: 2021-11-28 | End: 2022-06-04

## 2021-11-28 RX ORDER — BUSPIRONE HYDROCHLORIDE 5 MG/1
5 TABLET ORAL 2 TIMES DAILY
Qty: 180 TABLET | Refills: 1 | Status: SHIPPED | OUTPATIENT
Start: 2021-11-28 | End: 2022-05-03

## 2021-11-28 RX ORDER — ALENDRONATE SODIUM 70 MG/1
70 TABLET ORAL
Qty: 12 TABLET | Refills: 1 | Status: SHIPPED | OUTPATIENT
Start: 2021-11-28 | End: 2022-06-21

## 2021-11-29 ENCOUNTER — PATIENT MESSAGE (OUTPATIENT)
Dept: ENDOCRINOLOGY | Facility: CLINIC | Age: 76
End: 2021-11-29
Payer: MEDICARE

## 2021-11-29 ENCOUNTER — CLINICAL SUPPORT (OUTPATIENT)
Dept: REHABILITATION | Facility: HOSPITAL | Age: 76
End: 2021-11-29
Payer: MEDICARE

## 2021-11-29 DIAGNOSIS — E11.22 TYPE 2 DIABETES MELLITUS WITH STAGE 3 CHRONIC KIDNEY DISEASE AND HYPERTENSION: Primary | ICD-10-CM

## 2021-11-29 DIAGNOSIS — M25.561 CHRONIC PAIN OF RIGHT KNEE: ICD-10-CM

## 2021-11-29 DIAGNOSIS — N18.30 TYPE 2 DIABETES MELLITUS WITH STAGE 3 CHRONIC KIDNEY DISEASE AND HYPERTENSION: Primary | ICD-10-CM

## 2021-11-29 DIAGNOSIS — E83.52 HYPERCALCEMIA: ICD-10-CM

## 2021-11-29 DIAGNOSIS — R26.89 ANTALGIC GAIT: ICD-10-CM

## 2021-11-29 DIAGNOSIS — G89.29 CHRONIC PAIN OF RIGHT KNEE: ICD-10-CM

## 2021-11-29 DIAGNOSIS — I12.9 TYPE 2 DIABETES MELLITUS WITH STAGE 3 CHRONIC KIDNEY DISEASE AND HYPERTENSION: Primary | ICD-10-CM

## 2021-11-29 DIAGNOSIS — M25.662 DECREASED RANGE OF MOTION OF LEFT KNEE: ICD-10-CM

## 2021-11-29 PROCEDURE — 97110 THERAPEUTIC EXERCISES: CPT | Mod: PO

## 2021-12-06 ENCOUNTER — PATIENT MESSAGE (OUTPATIENT)
Dept: ENDOCRINOLOGY | Facility: CLINIC | Age: 76
End: 2021-12-06
Payer: MEDICARE

## 2021-12-06 ENCOUNTER — OFFICE VISIT (OUTPATIENT)
Dept: PODIATRY | Facility: CLINIC | Age: 76
End: 2021-12-06
Payer: MEDICARE

## 2021-12-06 ENCOUNTER — TELEPHONE (OUTPATIENT)
Dept: ADMINISTRATIVE | Facility: CLINIC | Age: 76
End: 2021-12-06
Payer: MEDICARE

## 2021-12-06 VITALS
WEIGHT: 238 LBS | HEIGHT: 63 IN | HEART RATE: 85 BPM | BODY MASS INDEX: 42.17 KG/M2 | SYSTOLIC BLOOD PRESSURE: 110 MMHG | DIASTOLIC BLOOD PRESSURE: 55 MMHG

## 2021-12-06 DIAGNOSIS — I12.9 TYPE 2 DIABETES MELLITUS WITH STAGE 3 CHRONIC KIDNEY DISEASE AND HYPERTENSION: ICD-10-CM

## 2021-12-06 DIAGNOSIS — E11.49 TYPE II DIABETES MELLITUS WITH NEUROLOGICAL MANIFESTATIONS: Primary | ICD-10-CM

## 2021-12-06 DIAGNOSIS — N18.30 TYPE 2 DIABETES MELLITUS WITH STAGE 3 CHRONIC KIDNEY DISEASE AND HYPERTENSION: ICD-10-CM

## 2021-12-06 DIAGNOSIS — E11.22 TYPE 2 DIABETES MELLITUS WITH STAGE 3 CHRONIC KIDNEY DISEASE AND HYPERTENSION: ICD-10-CM

## 2021-12-06 DIAGNOSIS — L84 CORN OR CALLUS: ICD-10-CM

## 2021-12-06 DIAGNOSIS — B35.1 DERMATOPHYTOSIS OF NAIL: ICD-10-CM

## 2021-12-06 PROCEDURE — 11056 PARNG/CUTG B9 HYPRKR LES 2-4: CPT | Mod: Q9,S$GLB,, | Performed by: PODIATRIST

## 2021-12-06 PROCEDURE — 4010F ACE/ARB THERAPY RXD/TAKEN: CPT | Mod: CPTII,S$GLB,, | Performed by: PODIATRIST

## 2021-12-06 PROCEDURE — 3066F PR DOCUMENTATION OF TREATMENT FOR NEPHROPATHY: ICD-10-PCS | Mod: CPTII,S$GLB,, | Performed by: PODIATRIST

## 2021-12-06 PROCEDURE — 3061F PR NEG MICROALBUMINURIA RESULT DOCUMENTED/REVIEW: ICD-10-PCS | Mod: CPTII,S$GLB,, | Performed by: PODIATRIST

## 2021-12-06 PROCEDURE — 99999 PR PBB SHADOW E&M-EST. PATIENT-LVL V: ICD-10-PCS | Mod: PBBFAC,,, | Performed by: PODIATRIST

## 2021-12-06 PROCEDURE — 11056 ROUTINE FOOT CARE: ICD-10-PCS | Mod: Q9,S$GLB,, | Performed by: PODIATRIST

## 2021-12-06 PROCEDURE — 99499 UNLISTED E&M SERVICE: CPT | Mod: S$GLB,,, | Performed by: PODIATRIST

## 2021-12-06 PROCEDURE — 3066F NEPHROPATHY DOC TX: CPT | Mod: CPTII,S$GLB,, | Performed by: PODIATRIST

## 2021-12-06 PROCEDURE — 99999 PR PBB SHADOW E&M-EST. PATIENT-LVL V: CPT | Mod: PBBFAC,,, | Performed by: PODIATRIST

## 2021-12-06 PROCEDURE — 11721 ROUTINE FOOT CARE: ICD-10-PCS | Mod: 59,Q9,S$GLB, | Performed by: PODIATRIST

## 2021-12-06 PROCEDURE — 3072F LOW RISK FOR RETINOPATHY: CPT | Mod: CPTII,S$GLB,, | Performed by: PODIATRIST

## 2021-12-06 PROCEDURE — 3061F NEG MICROALBUMINURIA REV: CPT | Mod: CPTII,S$GLB,, | Performed by: PODIATRIST

## 2021-12-06 PROCEDURE — 3072F PR LOW RISK FOR RETINOPATHY: ICD-10-PCS | Mod: CPTII,S$GLB,, | Performed by: PODIATRIST

## 2021-12-06 PROCEDURE — 11721 DEBRIDE NAIL 6 OR MORE: CPT | Mod: 59,Q9,S$GLB, | Performed by: PODIATRIST

## 2021-12-06 PROCEDURE — 99499 NO LOS: ICD-10-PCS | Mod: S$GLB,,, | Performed by: PODIATRIST

## 2021-12-06 PROCEDURE — 4010F PR ACE/ARB THEARPY RXD/TAKEN: ICD-10-PCS | Mod: CPTII,S$GLB,, | Performed by: PODIATRIST

## 2021-12-06 RX ORDER — GLIPIZIDE 5 MG/1
TABLET ORAL
Qty: 352 TABLET | Refills: 3 | Status: SHIPPED | OUTPATIENT
Start: 2021-12-06 | End: 2021-12-27

## 2021-12-07 ENCOUNTER — CLINICAL SUPPORT (OUTPATIENT)
Dept: REHABILITATION | Facility: HOSPITAL | Age: 76
End: 2021-12-07
Payer: MEDICARE

## 2021-12-07 DIAGNOSIS — G89.29 CHRONIC PAIN OF RIGHT KNEE: ICD-10-CM

## 2021-12-07 DIAGNOSIS — R26.89 ANTALGIC GAIT: ICD-10-CM

## 2021-12-07 DIAGNOSIS — M25.561 CHRONIC PAIN OF RIGHT KNEE: ICD-10-CM

## 2021-12-07 DIAGNOSIS — M25.662 DECREASED RANGE OF MOTION OF LEFT KNEE: ICD-10-CM

## 2021-12-07 PROCEDURE — 97110 THERAPEUTIC EXERCISES: CPT | Mod: PO

## 2021-12-08 ENCOUNTER — PES CALL (OUTPATIENT)
Dept: ADMINISTRATIVE | Facility: CLINIC | Age: 76
End: 2021-12-08
Payer: MEDICARE

## 2021-12-09 ENCOUNTER — CLINICAL SUPPORT (OUTPATIENT)
Dept: REHABILITATION | Facility: HOSPITAL | Age: 76
End: 2021-12-09
Payer: MEDICARE

## 2021-12-09 DIAGNOSIS — M25.561 CHRONIC PAIN OF RIGHT KNEE: ICD-10-CM

## 2021-12-09 DIAGNOSIS — R26.89 ANTALGIC GAIT: ICD-10-CM

## 2021-12-09 DIAGNOSIS — G89.29 CHRONIC PAIN OF RIGHT KNEE: ICD-10-CM

## 2021-12-09 DIAGNOSIS — M25.662 DECREASED RANGE OF MOTION OF LEFT KNEE: ICD-10-CM

## 2021-12-09 PROCEDURE — 97110 THERAPEUTIC EXERCISES: CPT | Mod: PO

## 2021-12-13 ENCOUNTER — LAB VISIT (OUTPATIENT)
Dept: LAB | Facility: HOSPITAL | Age: 76
End: 2021-12-13
Attending: NURSE PRACTITIONER
Payer: MEDICARE

## 2021-12-13 ENCOUNTER — TELEPHONE (OUTPATIENT)
Dept: REHABILITATION | Facility: HOSPITAL | Age: 76
End: 2021-12-13

## 2021-12-13 ENCOUNTER — CLINICAL SUPPORT (OUTPATIENT)
Dept: REHABILITATION | Facility: HOSPITAL | Age: 76
End: 2021-12-13
Payer: MEDICARE

## 2021-12-13 DIAGNOSIS — G89.29 CHRONIC PAIN OF RIGHT KNEE: ICD-10-CM

## 2021-12-13 DIAGNOSIS — M25.662 DECREASED RANGE OF MOTION OF LEFT KNEE: ICD-10-CM

## 2021-12-13 DIAGNOSIS — R26.89 ANTALGIC GAIT: ICD-10-CM

## 2021-12-13 DIAGNOSIS — M25.561 CHRONIC PAIN OF RIGHT KNEE: ICD-10-CM

## 2021-12-13 DIAGNOSIS — E83.52 HYPERCALCEMIA: ICD-10-CM

## 2021-12-13 LAB
ALBUMIN SERPL BCP-MCNC: 3.1 G/DL (ref 3.5–5.2)
ANION GAP SERPL CALC-SCNC: 7 MMOL/L (ref 8–16)
BUN SERPL-MCNC: 17 MG/DL (ref 8–23)
CALCIUM SERPL-MCNC: 9.8 MG/DL (ref 8.7–10.5)
CHLORIDE SERPL-SCNC: 104 MMOL/L (ref 95–110)
CO2 SERPL-SCNC: 25 MMOL/L (ref 23–29)
CREAT SERPL-MCNC: 1 MG/DL (ref 0.5–1.4)
EST. GFR  (AFRICAN AMERICAN): >60 ML/MIN/1.73 M^2
EST. GFR  (NON AFRICAN AMERICAN): 55.6 ML/MIN/1.73 M^2
GLUCOSE SERPL-MCNC: 156 MG/DL (ref 70–110)
PHOSPHATE SERPL-MCNC: 1.9 MG/DL (ref 2.7–4.5)
POTASSIUM SERPL-SCNC: 3.8 MMOL/L (ref 3.5–5.1)
SODIUM SERPL-SCNC: 136 MMOL/L (ref 136–145)

## 2021-12-13 PROCEDURE — 97110 THERAPEUTIC EXERCISES: CPT | Mod: PO

## 2021-12-13 PROCEDURE — 97112 NEUROMUSCULAR REEDUCATION: CPT | Mod: PO

## 2021-12-13 PROCEDURE — 36415 COLL VENOUS BLD VENIPUNCTURE: CPT | Mod: PN | Performed by: NURSE PRACTITIONER

## 2021-12-13 PROCEDURE — 80069 RENAL FUNCTION PANEL: CPT | Performed by: NURSE PRACTITIONER

## 2021-12-14 ENCOUNTER — PATIENT MESSAGE (OUTPATIENT)
Dept: ENDOCRINOLOGY | Facility: CLINIC | Age: 76
End: 2021-12-14
Payer: MEDICARE

## 2021-12-15 ENCOUNTER — CLINICAL SUPPORT (OUTPATIENT)
Dept: REHABILITATION | Facility: HOSPITAL | Age: 76
End: 2021-12-15
Payer: MEDICARE

## 2021-12-15 DIAGNOSIS — M25.561 CHRONIC PAIN OF RIGHT KNEE: ICD-10-CM

## 2021-12-15 DIAGNOSIS — G89.29 CHRONIC PAIN OF RIGHT KNEE: ICD-10-CM

## 2021-12-15 DIAGNOSIS — M25.662 DECREASED RANGE OF MOTION OF LEFT KNEE: ICD-10-CM

## 2021-12-15 DIAGNOSIS — R26.89 ANTALGIC GAIT: ICD-10-CM

## 2021-12-15 PROCEDURE — 97110 THERAPEUTIC EXERCISES: CPT | Mod: PO

## 2021-12-16 ENCOUNTER — PATIENT OUTREACH (OUTPATIENT)
Dept: ADMINISTRATIVE | Facility: OTHER | Age: 76
End: 2021-12-16
Payer: MEDICARE

## 2021-12-16 DIAGNOSIS — E83.52 HYPERCALCEMIA: Primary | ICD-10-CM

## 2021-12-20 ENCOUNTER — OFFICE VISIT (OUTPATIENT)
Dept: NEPHROLOGY | Facility: CLINIC | Age: 76
End: 2021-12-20
Payer: MEDICARE

## 2021-12-20 ENCOUNTER — CLINICAL SUPPORT (OUTPATIENT)
Dept: REHABILITATION | Facility: HOSPITAL | Age: 76
End: 2021-12-20
Payer: MEDICARE

## 2021-12-20 VITALS
SYSTOLIC BLOOD PRESSURE: 104 MMHG | BODY MASS INDEX: 44.63 KG/M2 | HEIGHT: 62 IN | DIASTOLIC BLOOD PRESSURE: 70 MMHG | WEIGHT: 242.5 LBS | HEART RATE: 96 BPM | OXYGEN SATURATION: 98 %

## 2021-12-20 DIAGNOSIS — M25.561 CHRONIC PAIN OF RIGHT KNEE: ICD-10-CM

## 2021-12-20 DIAGNOSIS — I10 PRIMARY HYPERTENSION: ICD-10-CM

## 2021-12-20 DIAGNOSIS — R26.89 ANTALGIC GAIT: ICD-10-CM

## 2021-12-20 DIAGNOSIS — N18.30 TYPE 2 DIABETES MELLITUS WITH STAGE 3 CHRONIC KIDNEY DISEASE AND HYPERTENSION: ICD-10-CM

## 2021-12-20 DIAGNOSIS — M25.662 DECREASED RANGE OF MOTION OF LEFT KNEE: ICD-10-CM

## 2021-12-20 DIAGNOSIS — I12.9 TYPE 2 DIABETES MELLITUS WITH STAGE 3 CHRONIC KIDNEY DISEASE AND HYPERTENSION: ICD-10-CM

## 2021-12-20 DIAGNOSIS — E11.22 TYPE 2 DIABETES MELLITUS WITH STAGE 3 CHRONIC KIDNEY DISEASE AND HYPERTENSION: ICD-10-CM

## 2021-12-20 DIAGNOSIS — G89.29 CHRONIC PAIN OF RIGHT KNEE: ICD-10-CM

## 2021-12-20 DIAGNOSIS — N18.31 STAGE 3A CHRONIC KIDNEY DISEASE: Primary | ICD-10-CM

## 2021-12-20 PROCEDURE — 3072F LOW RISK FOR RETINOPATHY: CPT | Mod: CPTII,S$GLB,, | Performed by: INTERNAL MEDICINE

## 2021-12-20 PROCEDURE — 4010F PR ACE/ARB THEARPY RXD/TAKEN: ICD-10-PCS | Mod: CPTII,S$GLB,, | Performed by: INTERNAL MEDICINE

## 2021-12-20 PROCEDURE — 99205 PR OFFICE/OUTPT VISIT, NEW, LEVL V, 60-74 MIN: ICD-10-PCS | Mod: S$GLB,,, | Performed by: INTERNAL MEDICINE

## 2021-12-20 PROCEDURE — 3061F PR NEG MICROALBUMINURIA RESULT DOCUMENTED/REVIEW: ICD-10-PCS | Mod: CPTII,S$GLB,, | Performed by: INTERNAL MEDICINE

## 2021-12-20 PROCEDURE — 97110 THERAPEUTIC EXERCISES: CPT | Mod: PO

## 2021-12-20 PROCEDURE — 99499 UNLISTED E&M SERVICE: CPT | Mod: S$GLB,,, | Performed by: INTERNAL MEDICINE

## 2021-12-20 PROCEDURE — 99999 PR PBB SHADOW E&M-EST. PATIENT-LVL IV: ICD-10-PCS | Mod: PBBFAC,,, | Performed by: INTERNAL MEDICINE

## 2021-12-20 PROCEDURE — 99205 OFFICE O/P NEW HI 60 MIN: CPT | Mod: S$GLB,,, | Performed by: INTERNAL MEDICINE

## 2021-12-20 PROCEDURE — 99999 PR PBB SHADOW E&M-EST. PATIENT-LVL IV: CPT | Mod: PBBFAC,,, | Performed by: INTERNAL MEDICINE

## 2021-12-20 PROCEDURE — 3061F NEG MICROALBUMINURIA REV: CPT | Mod: CPTII,S$GLB,, | Performed by: INTERNAL MEDICINE

## 2021-12-20 PROCEDURE — 3072F PR LOW RISK FOR RETINOPATHY: ICD-10-PCS | Mod: CPTII,S$GLB,, | Performed by: INTERNAL MEDICINE

## 2021-12-20 PROCEDURE — 3066F PR DOCUMENTATION OF TREATMENT FOR NEPHROPATHY: ICD-10-PCS | Mod: CPTII,S$GLB,, | Performed by: INTERNAL MEDICINE

## 2021-12-20 PROCEDURE — 4010F ACE/ARB THERAPY RXD/TAKEN: CPT | Mod: CPTII,S$GLB,, | Performed by: INTERNAL MEDICINE

## 2021-12-20 PROCEDURE — 3066F NEPHROPATHY DOC TX: CPT | Mod: CPTII,S$GLB,, | Performed by: INTERNAL MEDICINE

## 2021-12-20 PROCEDURE — 99499 RISK ADDL DX/OHS AUDIT: ICD-10-PCS | Mod: S$GLB,,, | Performed by: INTERNAL MEDICINE

## 2021-12-21 ENCOUNTER — TELEPHONE (OUTPATIENT)
Dept: ADMINISTRATIVE | Facility: CLINIC | Age: 76
End: 2021-12-21
Payer: MEDICARE

## 2021-12-22 ENCOUNTER — PATIENT MESSAGE (OUTPATIENT)
Dept: CARDIOLOGY | Facility: CLINIC | Age: 76
End: 2021-12-22
Payer: MEDICARE

## 2021-12-22 ENCOUNTER — TELEPHONE (OUTPATIENT)
Dept: CARDIAC REHAB | Facility: CLINIC | Age: 76
End: 2021-12-22
Payer: MEDICARE

## 2021-12-23 ENCOUNTER — OFFICE VISIT (OUTPATIENT)
Dept: HOME HEALTH SERVICES | Facility: CLINIC | Age: 76
End: 2021-12-23
Payer: MEDICARE

## 2021-12-23 ENCOUNTER — TELEPHONE (OUTPATIENT)
Dept: ADMINISTRATIVE | Facility: CLINIC | Age: 76
End: 2021-12-23
Payer: MEDICARE

## 2021-12-23 ENCOUNTER — PATIENT MESSAGE (OUTPATIENT)
Dept: PRIMARY CARE CLINIC | Facility: CLINIC | Age: 76
End: 2021-12-23
Payer: MEDICARE

## 2021-12-23 VITALS — WEIGHT: 242 LBS | BODY MASS INDEX: 44.53 KG/M2 | HEIGHT: 62 IN

## 2021-12-23 DIAGNOSIS — N18.2 CKD STAGE 2 DUE TO TYPE 2 DIABETES MELLITUS: ICD-10-CM

## 2021-12-23 DIAGNOSIS — M81.0 OSTEOPOROSIS WITHOUT CURRENT PATHOLOGICAL FRACTURE, UNSPECIFIED OSTEOPOROSIS TYPE: Chronic | ICD-10-CM

## 2021-12-23 DIAGNOSIS — E11.22 CKD STAGE 2 DUE TO TYPE 2 DIABETES MELLITUS: ICD-10-CM

## 2021-12-23 DIAGNOSIS — E66.01 MORBID OBESITY WITH BMI OF 40.0-44.9, ADULT: ICD-10-CM

## 2021-12-23 DIAGNOSIS — I25.708 CORONARY ARTERY DISEASE OF BYPASS GRAFT OF NATIVE HEART WITH STABLE ANGINA PECTORIS: Chronic | ICD-10-CM

## 2021-12-23 DIAGNOSIS — R54 AGE-RELATED PHYSICAL DEBILITY: Primary | ICD-10-CM

## 2021-12-23 DIAGNOSIS — Z99.89 DEPENDENCE ON OTHER ENABLING MACHINES AND DEVICES: ICD-10-CM

## 2021-12-23 DIAGNOSIS — I10 ESSENTIAL HYPERTENSION: Chronic | ICD-10-CM

## 2021-12-23 DIAGNOSIS — R26.9 ABNORMALITY OF GAIT AND MOBILITY: ICD-10-CM

## 2021-12-23 DIAGNOSIS — I70.0 AORTIC ATHEROSCLEROSIS: ICD-10-CM

## 2021-12-23 DIAGNOSIS — Z00.00 ENCOUNTER FOR PREVENTIVE HEALTH EXAMINATION: Primary | ICD-10-CM

## 2021-12-23 DIAGNOSIS — R41.3 MEMORY PROBLEM: ICD-10-CM

## 2021-12-23 PROBLEM — N18.31 STAGE 3A CHRONIC KIDNEY DISEASE: Status: RESOLVED | Noted: 2017-04-20 | Resolved: 2021-12-23

## 2021-12-23 PROBLEM — I21.4 NSTEMI (NON-ST ELEVATED MYOCARDIAL INFARCTION): Status: RESOLVED | Noted: 2020-06-15 | Resolved: 2021-12-23

## 2021-12-23 PROCEDURE — 3066F NEPHROPATHY DOC TX: CPT | Mod: CPTII,S$GLB,, | Performed by: NURSE PRACTITIONER

## 2021-12-23 PROCEDURE — 3008F BODY MASS INDEX DOCD: CPT | Mod: CPTII,S$GLB,, | Performed by: NURSE PRACTITIONER

## 2021-12-23 PROCEDURE — 3288F FALL RISK ASSESSMENT DOCD: CPT | Mod: CPTII,S$GLB,, | Performed by: NURSE PRACTITIONER

## 2021-12-23 PROCEDURE — 1170F FXNL STATUS ASSESSED: CPT | Mod: CPTII,S$GLB,, | Performed by: NURSE PRACTITIONER

## 2021-12-23 PROCEDURE — 1101F PR PT FALLS ASSESS DOC 0-1 FALLS W/OUT INJ PAST YR: ICD-10-PCS | Mod: CPTII,S$GLB,, | Performed by: NURSE PRACTITIONER

## 2021-12-23 PROCEDURE — 3288F PR FALLS RISK ASSESSMENT DOCUMENTED: ICD-10-PCS | Mod: CPTII,S$GLB,, | Performed by: NURSE PRACTITIONER

## 2021-12-23 PROCEDURE — G0439 PR MEDICARE ANNUAL WELLNESS SUBSEQUENT VISIT: ICD-10-PCS | Mod: 95,,, | Performed by: NURSE PRACTITIONER

## 2021-12-23 PROCEDURE — 1170F PR FUNCTIONAL STATUS ASSESSED: ICD-10-PCS | Mod: CPTII,S$GLB,, | Performed by: NURSE PRACTITIONER

## 2021-12-23 PROCEDURE — 4010F PR ACE/ARB THEARPY RXD/TAKEN: ICD-10-PCS | Mod: CPTII,S$GLB,, | Performed by: NURSE PRACTITIONER

## 2021-12-23 PROCEDURE — 3066F PR DOCUMENTATION OF TREATMENT FOR NEPHROPATHY: ICD-10-PCS | Mod: CPTII,S$GLB,, | Performed by: NURSE PRACTITIONER

## 2021-12-23 PROCEDURE — G0439 PPPS, SUBSEQ VISIT: HCPCS | Mod: 95,,, | Performed by: NURSE PRACTITIONER

## 2021-12-23 PROCEDURE — 3072F PR LOW RISK FOR RETINOPATHY: ICD-10-PCS | Mod: CPTII,S$GLB,, | Performed by: NURSE PRACTITIONER

## 2021-12-23 PROCEDURE — 3072F LOW RISK FOR RETINOPATHY: CPT | Mod: CPTII,S$GLB,, | Performed by: NURSE PRACTITIONER

## 2021-12-23 PROCEDURE — 3051F PR MOST RECENT HEMOGLOBIN A1C LEVEL 7.0 - < 8.0%: ICD-10-PCS | Mod: CPTII,S$GLB,, | Performed by: NURSE PRACTITIONER

## 2021-12-23 PROCEDURE — 99499 RISK ADDL DX/OHS AUDIT: ICD-10-PCS | Mod: S$GLB,,, | Performed by: NURSE PRACTITIONER

## 2021-12-23 PROCEDURE — 3061F NEG MICROALBUMINURIA REV: CPT | Mod: CPTII,S$GLB,, | Performed by: NURSE PRACTITIONER

## 2021-12-23 PROCEDURE — 1101F PT FALLS ASSESS-DOCD LE1/YR: CPT | Mod: CPTII,S$GLB,, | Performed by: NURSE PRACTITIONER

## 2021-12-23 PROCEDURE — 4010F ACE/ARB THERAPY RXD/TAKEN: CPT | Mod: CPTII,S$GLB,, | Performed by: NURSE PRACTITIONER

## 2021-12-23 PROCEDURE — 3008F PR BODY MASS INDEX (BMI) DOCUMENTED: ICD-10-PCS | Mod: CPTII,S$GLB,, | Performed by: NURSE PRACTITIONER

## 2021-12-23 PROCEDURE — 3051F HG A1C>EQUAL 7.0%<8.0%: CPT | Mod: CPTII,S$GLB,, | Performed by: NURSE PRACTITIONER

## 2021-12-23 PROCEDURE — 99499 UNLISTED E&M SERVICE: CPT | Mod: S$GLB,,, | Performed by: NURSE PRACTITIONER

## 2021-12-23 PROCEDURE — 3061F PR NEG MICROALBUMINURIA RESULT DOCUMENTED/REVIEW: ICD-10-PCS | Mod: CPTII,S$GLB,, | Performed by: NURSE PRACTITIONER

## 2021-12-27 ENCOUNTER — PATIENT MESSAGE (OUTPATIENT)
Dept: ENDOCRINOLOGY | Facility: CLINIC | Age: 76
End: 2021-12-27
Payer: MEDICARE

## 2021-12-27 DIAGNOSIS — N18.30 TYPE 2 DIABETES MELLITUS WITH STAGE 3 CHRONIC KIDNEY DISEASE AND HYPERTENSION: ICD-10-CM

## 2021-12-27 DIAGNOSIS — I12.9 TYPE 2 DIABETES MELLITUS WITH STAGE 3 CHRONIC KIDNEY DISEASE AND HYPERTENSION: ICD-10-CM

## 2021-12-27 DIAGNOSIS — E11.22 TYPE 2 DIABETES MELLITUS WITH STAGE 3 CHRONIC KIDNEY DISEASE AND HYPERTENSION: ICD-10-CM

## 2021-12-27 RX ORDER — GLIPIZIDE 5 MG/1
10 TABLET ORAL 2 TIMES DAILY WITH MEALS
Qty: 120 TABLET | Refills: 8 | Status: SHIPPED | OUTPATIENT
Start: 2021-12-27 | End: 2022-01-24 | Stop reason: SDUPTHER

## 2022-01-03 ENCOUNTER — PATIENT MESSAGE (OUTPATIENT)
Dept: NEPHROLOGY | Facility: CLINIC | Age: 77
End: 2022-01-03
Payer: MEDICARE

## 2022-01-05 ENCOUNTER — PATIENT MESSAGE (OUTPATIENT)
Dept: ENDOCRINOLOGY | Facility: CLINIC | Age: 77
End: 2022-01-05
Payer: MEDICARE

## 2022-01-06 ENCOUNTER — LAB VISIT (OUTPATIENT)
Dept: LAB | Facility: HOSPITAL | Age: 77
End: 2022-01-06
Attending: INTERNAL MEDICINE
Payer: MEDICARE

## 2022-01-06 DIAGNOSIS — E11.22 TYPE 2 DIABETES MELLITUS WITH STAGE 3 CHRONIC KIDNEY DISEASE AND HYPERTENSION: Chronic | ICD-10-CM

## 2022-01-06 DIAGNOSIS — E83.52 HYPERCALCEMIA: ICD-10-CM

## 2022-01-06 DIAGNOSIS — N18.30 TYPE 2 DIABETES MELLITUS WITH STAGE 3 CHRONIC KIDNEY DISEASE AND HYPERTENSION: Chronic | ICD-10-CM

## 2022-01-06 DIAGNOSIS — I12.9 TYPE 2 DIABETES MELLITUS WITH STAGE 3 CHRONIC KIDNEY DISEASE AND HYPERTENSION: Chronic | ICD-10-CM

## 2022-01-06 PROCEDURE — 36415 COLL VENOUS BLD VENIPUNCTURE: CPT | Mod: PN | Performed by: NURSE PRACTITIONER

## 2022-01-06 PROCEDURE — 83970 ASSAY OF PARATHORMONE: CPT | Performed by: NURSE PRACTITIONER

## 2022-01-06 PROCEDURE — 84100 ASSAY OF PHOSPHORUS: CPT | Performed by: NURSE PRACTITIONER

## 2022-01-06 PROCEDURE — 80053 COMPREHEN METABOLIC PANEL: CPT | Performed by: NURSE PRACTITIONER

## 2022-01-06 PROCEDURE — 83036 HEMOGLOBIN GLYCOSYLATED A1C: CPT | Performed by: NURSE PRACTITIONER

## 2022-01-06 NOTE — PROGRESS NOTES
Patient ID: Kaylee Romero is a 76 y.o. female    Chief Complaint:   No chief complaint on file.    HPI: Kaylee Romero with type 2 diabetes complicated by CAD s/p CABG, TIAs, hypertension, dyslipidemia, CKD Stage 3, obesity, osteoporosis, and KAMRAN presents for follow up of Type 2 diabetes and osteoporosis. Previous patient of [facility]. Last visit in Sept 2021.      Was seen in the hospital by KAVON Pritchard NP in 9/2019 because of a Surgical Procedure and Date: CABG 8/12/19.  He started her on levemir daily.     At her Sept 2020 she had a steroid injection and gave extra long acting insulin. Interim visit for lower blood sugars in Sept 2020 (BGs in 40-60's) and we stopped her basaglar as she gave up sugar.     At her previous visits, she would stop and restart insulin based on what she was eating -sweets daily.     Since her last visit, she states she is still addicted to sweets and she has been indulging during the holidays. States she feels some pain beneath her left breast - burning sensation. States she feels as it is a tear underneath her skin.    With regards to the diabetes:     Diagnosed with Type 2 diabetes: 2013  Family history of Type 2 diabetes: father  Known complications:  DKA-  RN-; due for appt -has appt   PN- seeing podiatry 12/6/2021  Nephropathy: now seeing nephrology   Gastroparesis: denies   CAD: 3 MI and one stroke     Current regimen:  Trulicity 4.5 mg weekly  Glipizide 10 mg breakfast and dinner  basaglar 25 units daily whenever her  injects (increased herself when noticed higher blood sugars)  NOVOLOG PRN steroid injection-none recent    Other medications tried:  Jardiance-insurance issues  Basaglar  Kumarga - did not want to start due to SE profile     Glucose Monitor:  She is checking blood sugar as below  Log reviewed: yes, digital medicine program  Hypoglycemia: BG 71 was around 5pm before breakfast     Denies symptoms of hypoglycemia-hypoglycemia unawareness   Knows how to correct  with 15 grams of carbs- juice, coke, or a peppermint.          Diet/Exercise: She feels that she is trying to follow diabetic diet. She is   eats 1-2 meals a day. She is eating off a small plate.  Breakfast: cottage cheese with fruit or activa or fried egg with pepper and ham or turkey and cheese with bread   Lunch sometimes skipped  Dinner sometimes skipped    She eats nuts in the afternoon   Main meal is vegetable and meat sometimes rice   Snacks: nuts and something sweet nightly.     Drinks: mostly water; OJ with breakfast; sometimes has lemon lime soft drink at times  Exercise - tries to stay active. No formal exercise. Has right knee pain. In wheelchair today     Education - last visit: none; politely declines  Has GVOKE    Denies history of pancreatitis & personal/family history of medullary thyroid cancer.    Lab Results   Component Value Date    HGBA1C 6.3 (H) 01/06/2022    HGBA1C 7.4 (H) 11/26/2021    HGBA1C 7.2 (H) 09/08/2021     Screening or Prevention Patient's value Goal Complete/Controlled?   HgA1C Testing and Control   Lab Results   Component Value Date    HGBA1C 6.3 (H) 01/06/2022      Annually/Less than 8% Yes   Lipid profile : 09/08/2021 Annually Yes   LDL control Lab Results   Component Value Date    LDLCALC 42.8 (L) 09/08/2021    Annually/Less than 100 mg/dl  Yes   Nephropathy screening Lab Results   Component Value Date    LABMICR <5.0 09/08/2021     Lab Results   Component Value Date    PROTEINUA 1+ (A) 08/28/2019    Annually No   Blood pressure BP Readings from Last 1 Encounters:   01/10/22 107/60    Less than 140/90 Yes   Dilated retinal exam : 12/01/2020 Annually Yes   Foot exam   : 08/09/2021 Annually Yes     With regards to osteoporosis:   Current meds: Fosamax  Started: 6/2019     Calcium intake- no calcium supplementation; has in diet   Vit D intake-  1000 twice weekly   Weight bearing exercise-   Walking as tolerated with knee pain    Recent falls- August 2018; September 2018 - no  fractures and no falls from a standing position.       They have made fall precautions in house      No recent fractures   No significant height loss (>2 inches)     tob use -  None  etoh use- None     Family history: none     No current diarrhea or h/o malabsorption     Menopause at age 56     Denies chronic exposure to anticoagulants, proton pump inhibitors or antiseizure medications.   +Steroid injections knees     Denies GERD, indigestion, or gastric bypass surgery.      Denies history of thyroid disease, anemia, kidney stones or kidney disease.      Denies active malignancy, history of malignancy the involved the bone, prior radiation treatment, or unexplained elevations of alk phos on labs      BMD 5/24/21  FINDINGS:  Lumbar spine (L1-L4):              BMD is 1.115 g/cm2, T-score is 0.6, and Z-score is 2.3.   Total hip:                               BMD is 0.838 g/cm2, T-score is -0.9, and Z-score is 0.0.   Femoral neck:                         BMD is 0.624 g/cm2, T-score is -2.0, and Z-score is -0.8.   FRAX:   7.7% risk of a major osteoporotic fracture in the next 10 years.  1.6% risk of hip fracture in the next 10 years.  Impression:  1. Osteoporosis on treatment with Fosamax  2. Compared with previous DXA, BMD at the lumbar spine has increased by 4.3%, and the BMD at the total hip has increased by 3.6%.  RECOMMENDATIONS:  RECOMMENDATIONS of [facility]:     1. Recommend daily calcium intake 1921-3277 mg, dietary sources preferred; Vitamin D 2832-0492 IU daily.  2. Adequate exercise and fall precautions.  3. Recommend continued treatment with Fosamax  4. Repeat BMD in 2 years    With regards to the vitamin d deficiency and hypercalcemia,     Transient increase in calcium level in Sept 2021 that normalized when we decrease Vitamin d and stopped chlorthalidone     currently taking otc 1000 twice weekly    Lab Results   Component Value Date    PTH 53.1 01/06/2022     "CALCIUM 10.1 01/06/2022    CALCIUM 10.1 01/06/2022    PHOS 3.6 01/06/2022     Lab Results   Component Value Date    ALBUMIN 3.3 (L) 01/06/2022    ALBUMIN 3.3 (L) 01/06/2022     Vit D, 25-Hydroxy   Date Value Ref Range Status   11/26/2021 66 30 - 96 ng/mL Final     Comment:     Vitamin D deficiency.........<10 ng/mL                              Vitamin D insufficiency......10-29 ng/mL       Vitamin D sufficiency........> or equal to 30 ng/mL  Vitamin D toxicity............>100 ng/mL       Denies constipation, depression, polyuria due to lasix, muscle aches or pains.    No recent fractures     Review of Systems   Constitutional: Negative for fatigue and unexpected weight change.   Eyes: Negative for visual disturbance.   Endocrine: Negative for polydipsia, polyphagia and polyuria.   Musculoskeletal: Positive for arthralgias.   Hematological: Bruises/bleeds easily.     OBJECTIVE    Physical Exam  Vitals reviewed.   Constitutional:       Appearance: Normal appearance.   Pulmonary:      Effort: Pulmonary effort is normal.   Musculoskeletal:      Comments: In wheelchair today   Neurological:      Mental Status: She is alert.     injection sites are without edema or erythema. No lipo hypertropthy or atrophy  DM foot exam deferred; done in Oct 2020      Current Outpatient Medications:     alendronate (FOSAMAX) 70 MG tablet, Take 1 tablet (70 mg total) by mouth every 7 days., Disp: 12 tablet, Rfl: 1    amLODIPine (NORVASC) 10 MG tablet, TAKE 1 TABLET(10 MG) BY MOUTH EVERY DAY, Disp: 90 tablet, Rfl: 1    atorvastatin (LIPITOR) 80 MG tablet, Take 1 tablet (80 mg total) by mouth once daily., Disp: 90 tablet, Rfl: 1    BD BOO 2ND GEN PEN NEEDLE 32 gauge x 5/32" Ndle, USE EVERY DAY, Disp: 100 each, Rfl: 8    blood sugar diagnostic Strp, 1 strip by Misc.(Non-Drug; Combo Route) route once daily., Disp: 100 strip, Rfl: 3    busPIRone (BUSPAR) 5 MG Tab, Take 1 tablet (5 mg total) by mouth 2 (two) times daily. For anxiety., " Disp: 180 tablet, Rfl: 1    carvediloL (COREG) 25 MG tablet, Take 1 tablet (25 mg total) by mouth 2 (two) times daily with meals., Disp: 180 tablet, Rfl: 1    clopidogreL (PLAVIX) 75 mg tablet, TAKE 1 TABLET(75 MG) BY MOUTH EVERY DAY, Disp: 90 tablet, Rfl: 1    COD LIVER OIL ORAL, Take 3 capsules by mouth once daily., Disp: , Rfl:     ferrous sulfate (FEOSOL) 325 mg (65 mg iron) Tab tablet, Take 1 tablet (325 mg total) by mouth once daily., Disp: 90 tablet, Rfl: 1    furosemide (LASIX) 20 MG tablet, TAKE 1 TABLET(20 MG) BY MOUTH EVERY DAY (Patient taking differently: 20 mg. TAKES ONLY ON Monday AND Thursday), Disp: 30 tablet, Rfl: 3    glipiZIDE (GLUCOTROL) 5 MG tablet, Take 2 tablets (10 mg total) by mouth 2 (two) times daily with meals. TAKE 2 TABLETS BY MOUTH DAILY WITH DINNER OR EVENING MEAL, Disp: 120 tablet, Rfl: 8    insulin glargine,hum.rec.anlog (BASAGLAR KWIKPEN U-100 INSULIN SUBQ), Inject 25 Units into the skin every evening., Disp: , Rfl:     irbesartan (AVAPRO) 300 MG tablet, Take 1 tablet (300 mg total) by mouth every evening., Disp: 90 tablet, Rfl: 1    isosorbide mononitrate (IMDUR) 30 MG 24 hr tablet, Take 1 tablet (30 mg total) by mouth once daily., Disp: 90 tablet, Rfl: 3    lancets Misc, 1 lancet by Misc.(Non-Drug; Combo Route) route once daily., Disp: 100 each, Rfl: 3    multivitamin (THERAGRAN) per tablet, Take 1 tablet by mouth once daily., Disp: , Rfl:     vitamin D (VITAMIN D3) 1000 units Tab, Take 1,000 Units by mouth twice a week. Monday AND THURSDAYS ONLY, Disp: , Rfl:     aspirin 81 MG Chew, Take 1 tablet (81 mg total) by mouth once daily., Disp: , Rfl: 0    diclofenac sodium (VOLTAREN) 1 % Gel, Apply 2 g topically 4 (four) times daily as needed. To painful area on the feet. (Patient not taking: No sig reported), Disp: 500 g, Rfl: 5    diphenoxylate-atropine 2.5-0.025 mg (LOMOTIL) 2.5-0.025 mg per tablet, Take 1 tablet by mouth 4 (four) times daily as needed for Diarrhea.,  Disp: 30 tablet, Rfl: 0    donepeziL (ARICEPT) 5 MG tablet, TAKE 1 TABLET(5 MG) BY MOUTH EVERY EVENING FOR MEMORY, Disp: 90 tablet, Rfl: 1    dulaglutide (TRULICITY) 4.5 mg/0.5 mL pen injector, Inject 4.5 mg into the skin every 7 days., Disp: 2 mL, Rfl: 3    glucagon (GVOKE HYPOPEN 2-PACK) 1 mg/0.2 mL AtIn, Inject 1 Package into the skin as needed. (Patient not taking: No sig reported), Disp: 2 Syringe, Rfl: 0    nitroGLYCERIN (NITROSTAT) 0.4 MG SL tablet, Place 1 tablet (0.4 mg total) under the tongue every 5 (five) minutes as needed for Chest pain. (Patient not taking: No sig reported), Disp: 25 tablet, Rfl: 6    Current Facility-Administered Medications:     hyaluronate sodium, stabilized (MONOVISC) Syrg, , Intra-articular, 1 time in Clinic/HOD, Peterson Sharma MD    Results for GERARDO HOLM (MRN [id number]) as of 1/10/2022 14:59   Ref. Range 1/6/2022 15:55   Sodium Latest Ref Range: 136 - 145 mmol/L 138   Potassium Latest Ref Range: 3.5 - 5.1 mmol/L 4.6   Chloride Latest Ref Range: 95 - 110 mmol/L 104   CO2 Latest Ref Range: 23 - 29 mmol/L 24   Anion Gap Latest Ref Range: 8 - 16 mmol/L 10   BUN Latest Ref Range: 8 - 23 mg/dL 17   Creatinine Latest Ref Range: 0.5 - 1.4 mg/dL 1.2   eGFR if non African American Latest Ref Range: >60 mL/min/1.73 m^2 44.6 (A)   eGFR if African American Latest Ref Range: >60 mL/min/1.73 m^2 51.4 (A)   Glucose Latest Ref Range: 70 - 110 mg/dL 103   Calcium Latest Ref Range: 8.7 - 10.5 mg/dL 10.1   Phosphorus Latest Ref Range: 2.7 - 4.5 mg/dL    Alkaline Phosphatase Latest Ref Range: 55 - 135 U/L 77   PROTEIN TOTAL Latest Ref Range: 6.0 - 8.4 g/dL 8.0   Albumin Latest Ref Range: 3.5 - 5.2 g/dL 3.3 (L)   BILIRUBIN TOTAL Latest Ref Range: 0.1 - 1.0 mg/dL 0.4   AST Latest Ref Range: 10 - 40 U/L 18   ALT Latest Ref Range: 10 - 44 U/L 17   Hemoglobin A1C External Latest Ref Range: 4.0 - 5.6 % 6.3 (H)   Estimated Avg Glucose Latest Ref Range: 68 - 131 mg/dL 134 (H)   PTH Latest Ref  Range: 9.0 - 77.0 pg/mL 53.1     Assessment and plan:   1. Type 2 diabetes mellitus with stage 3 chronic kidney disease and hypertension  GLUCOSE MONITORING CONTINUOUS MIN 72 HOURS    Comprehensive Metabolic Panel    Hemoglobin A1C   2. CKD stage 3 due to type 2 diabetes mellitus     3. Osteoporosis without current pathological fracture, unspecified osteoporosis type     4. Vitamin D deficiency, unspecified     5. Essential hypertension     6. Dyslipidemia, goal LDL below 70         Type 2 diabetes mellitus with stage 3 chronic kidney disease, without long-term current use of insulin  -- Labs prior  -- A1c 7-7.5% without hypoglycemia. At goal.  -- Medication Changes:      She reports she is unable to give up sweets. We will perform a professional continuous glucose monitor in order to assess blood sugar trends and patterns, highs and lows, and determine treatment plan.      Continue   Trulicity 4.5 mg weekly  Glipizide 10 mg twice daily  basaglar 25 units daily     Message me for any blood sugar less than 70 and we will adjustments to your regimen. Message me for persistent blood sugars above 180.  -- We will continue novolog for steroid injections. Discussed she will take the novolog before meals with sliding scale below.  With using correction scale:  MDD of:   150-200: +2 units  201-250: +4 units  251-300: +6 units  301-350: +8 units  >350: +10 units    -- Has GVOKE pen.   -- She is fearful of DKA Discussed signs and symptoms of DKA and instructed to buy ketone strips  -- Reviewed goals of therapy are to get the best control we can without hypoglycemia  -- Insulin injection sites and proper rotation instructed.    -- Advised frequent self blood glucose monitoring.  Patient encouraged to document glucose results and bring them to every clinic visit.  -- Hypoglycemia precautions discussed. Instructed on precautions before driving.    -- Call for Bg repeatedly < 90 or > 180.   -- Close adherence to lifestyle changes  recommended.   -- Periodic follow ups for eye evaluations, foot care and dental care suggested. UTD    Osteoporosis without current pathological fracture  -- Continue fosamax weekly  -- Reviewed basics of quantity versus quality  -- Reassuring she is not fracturing   -- RDA of calcium (5563-6283 mg /day in divided doses) and vitamin D 2000iu a day  discussed  -- Calcium from food sources preferred  -- Fall precautions emphasized  -- +weight bearing exercise  -- Repeat DEXA scan in May 2023     Vitamin D deficiency, unspecified  Continue vit d 1000 twice weekly    Essential hypertension  -- on ARB  -- Controlled  -- Blood pressure goals discussed with patient    Dyslipidemia, goal LDL below 70  -- Controlled   -- On statin per ADA recommendations      Follow up in about 4 months (around 5/10/2022).

## 2022-01-07 ENCOUNTER — PATIENT MESSAGE (OUTPATIENT)
Dept: ENDOCRINOLOGY | Facility: CLINIC | Age: 77
End: 2022-01-07
Payer: MEDICARE

## 2022-01-07 LAB
ALBUMIN SERPL BCP-MCNC: 3.3 G/DL (ref 3.5–5.2)
ALBUMIN SERPL BCP-MCNC: 3.3 G/DL (ref 3.5–5.2)
ALP SERPL-CCNC: 77 U/L (ref 55–135)
ALT SERPL W/O P-5'-P-CCNC: 17 U/L (ref 10–44)
ANION GAP SERPL CALC-SCNC: 10 MMOL/L (ref 8–16)
ANION GAP SERPL CALC-SCNC: 10 MMOL/L (ref 8–16)
AST SERPL-CCNC: 18 U/L (ref 10–40)
BILIRUB SERPL-MCNC: 0.4 MG/DL (ref 0.1–1)
BUN SERPL-MCNC: 17 MG/DL (ref 8–23)
BUN SERPL-MCNC: 17 MG/DL (ref 8–23)
CALCIUM SERPL-MCNC: 10.1 MG/DL (ref 8.7–10.5)
CALCIUM SERPL-MCNC: 10.1 MG/DL (ref 8.7–10.5)
CHLORIDE SERPL-SCNC: 104 MMOL/L (ref 95–110)
CHLORIDE SERPL-SCNC: 104 MMOL/L (ref 95–110)
CO2 SERPL-SCNC: 24 MMOL/L (ref 23–29)
CO2 SERPL-SCNC: 24 MMOL/L (ref 23–29)
CREAT SERPL-MCNC: 1.2 MG/DL (ref 0.5–1.4)
CREAT SERPL-MCNC: 1.2 MG/DL (ref 0.5–1.4)
EST. GFR  (AFRICAN AMERICAN): 51.4 ML/MIN/1.73 M^2
EST. GFR  (AFRICAN AMERICAN): 51.4 ML/MIN/1.73 M^2
EST. GFR  (NON AFRICAN AMERICAN): 44.6 ML/MIN/1.73 M^2
EST. GFR  (NON AFRICAN AMERICAN): 44.6 ML/MIN/1.73 M^2
ESTIMATED AVG GLUCOSE: 134 MG/DL (ref 68–131)
GLUCOSE SERPL-MCNC: 103 MG/DL (ref 70–110)
GLUCOSE SERPL-MCNC: 103 MG/DL (ref 70–110)
HBA1C MFR BLD: 6.3 % (ref 4–5.6)
PHOSPHATE SERPL-MCNC: 3.6 MG/DL (ref 2.7–4.5)
POTASSIUM SERPL-SCNC: 4.6 MMOL/L (ref 3.5–5.1)
POTASSIUM SERPL-SCNC: 4.6 MMOL/L (ref 3.5–5.1)
PROT SERPL-MCNC: 8 G/DL (ref 6–8.4)
PTH-INTACT SERPL-MCNC: 53.1 PG/ML (ref 9–77)
SODIUM SERPL-SCNC: 138 MMOL/L (ref 136–145)
SODIUM SERPL-SCNC: 138 MMOL/L (ref 136–145)

## 2022-01-10 ENCOUNTER — OFFICE VISIT (OUTPATIENT)
Dept: ENDOCRINOLOGY | Facility: CLINIC | Age: 77
End: 2022-01-10
Payer: MEDICARE

## 2022-01-10 VITALS
SYSTOLIC BLOOD PRESSURE: 107 MMHG | DIASTOLIC BLOOD PRESSURE: 60 MMHG | WEIGHT: 239 LBS | HEIGHT: 62 IN | BODY MASS INDEX: 43.98 KG/M2

## 2022-01-10 DIAGNOSIS — E78.5 DYSLIPIDEMIA, GOAL LDL BELOW 70: ICD-10-CM

## 2022-01-10 DIAGNOSIS — E55.9 VITAMIN D DEFICIENCY, UNSPECIFIED: ICD-10-CM

## 2022-01-10 DIAGNOSIS — I12.9 TYPE 2 DIABETES MELLITUS WITH STAGE 3 CHRONIC KIDNEY DISEASE AND HYPERTENSION: Primary | ICD-10-CM

## 2022-01-10 DIAGNOSIS — E11.22 TYPE 2 DIABETES MELLITUS WITH STAGE 3 CHRONIC KIDNEY DISEASE AND HYPERTENSION: Primary | ICD-10-CM

## 2022-01-10 DIAGNOSIS — N18.30 CKD STAGE 3 DUE TO TYPE 2 DIABETES MELLITUS: ICD-10-CM

## 2022-01-10 DIAGNOSIS — N18.30 TYPE 2 DIABETES MELLITUS WITH STAGE 3 CHRONIC KIDNEY DISEASE AND HYPERTENSION: Primary | ICD-10-CM

## 2022-01-10 DIAGNOSIS — I10 ESSENTIAL HYPERTENSION: Chronic | ICD-10-CM

## 2022-01-10 DIAGNOSIS — M81.0 OSTEOPOROSIS WITHOUT CURRENT PATHOLOGICAL FRACTURE, UNSPECIFIED OSTEOPOROSIS TYPE: Chronic | ICD-10-CM

## 2022-01-10 DIAGNOSIS — E11.22 CKD STAGE 3 DUE TO TYPE 2 DIABETES MELLITUS: ICD-10-CM

## 2022-01-10 PROCEDURE — 99499 RISK ADDL DX/OHS AUDIT: ICD-10-PCS | Mod: S$GLB,,, | Performed by: NURSE PRACTITIONER

## 2022-01-10 PROCEDURE — 1159F MED LIST DOCD IN RCRD: CPT | Mod: CPTII,S$GLB,, | Performed by: NURSE PRACTITIONER

## 2022-01-10 PROCEDURE — 1101F PR PT FALLS ASSESS DOC 0-1 FALLS W/OUT INJ PAST YR: ICD-10-PCS | Mod: CPTII,S$GLB,, | Performed by: NURSE PRACTITIONER

## 2022-01-10 PROCEDURE — 99214 PR OFFICE/OUTPT VISIT, EST, LEVL IV, 30-39 MIN: ICD-10-PCS | Mod: S$GLB,,, | Performed by: NURSE PRACTITIONER

## 2022-01-10 PROCEDURE — 3074F PR MOST RECENT SYSTOLIC BLOOD PRESSURE < 130 MM HG: ICD-10-PCS | Mod: CPTII,S$GLB,, | Performed by: NURSE PRACTITIONER

## 2022-01-10 PROCEDURE — 3008F BODY MASS INDEX DOCD: CPT | Mod: CPTII,S$GLB,, | Performed by: NURSE PRACTITIONER

## 2022-01-10 PROCEDURE — 1101F PT FALLS ASSESS-DOCD LE1/YR: CPT | Mod: CPTII,S$GLB,, | Performed by: NURSE PRACTITIONER

## 2022-01-10 PROCEDURE — 3074F SYST BP LT 130 MM HG: CPT | Mod: CPTII,S$GLB,, | Performed by: NURSE PRACTITIONER

## 2022-01-10 PROCEDURE — 3008F PR BODY MASS INDEX (BMI) DOCUMENTED: ICD-10-PCS | Mod: CPTII,S$GLB,, | Performed by: NURSE PRACTITIONER

## 2022-01-10 PROCEDURE — 99499 UNLISTED E&M SERVICE: CPT | Mod: S$GLB,,, | Performed by: NURSE PRACTITIONER

## 2022-01-10 PROCEDURE — 3044F HG A1C LEVEL LT 7.0%: CPT | Mod: CPTII,S$GLB,, | Performed by: NURSE PRACTITIONER

## 2022-01-10 PROCEDURE — 1160F PR REVIEW ALL MEDS BY PRESCRIBER/CLIN PHARMACIST DOCUMENTED: ICD-10-PCS | Mod: CPTII,S$GLB,, | Performed by: NURSE PRACTITIONER

## 2022-01-10 PROCEDURE — 99214 OFFICE O/P EST MOD 30 MIN: CPT | Mod: S$GLB,,, | Performed by: NURSE PRACTITIONER

## 2022-01-10 PROCEDURE — 99999 PR PBB SHADOW E&M-EST. PATIENT-LVL V: CPT | Mod: PBBFAC,,, | Performed by: NURSE PRACTITIONER

## 2022-01-10 PROCEDURE — 3288F PR FALLS RISK ASSESSMENT DOCUMENTED: ICD-10-PCS | Mod: CPTII,S$GLB,, | Performed by: NURSE PRACTITIONER

## 2022-01-10 PROCEDURE — 99999 PR PBB SHADOW E&M-EST. PATIENT-LVL V: ICD-10-PCS | Mod: PBBFAC,,, | Performed by: NURSE PRACTITIONER

## 2022-01-10 PROCEDURE — 3078F DIAST BP <80 MM HG: CPT | Mod: CPTII,S$GLB,, | Performed by: NURSE PRACTITIONER

## 2022-01-10 PROCEDURE — 3044F PR MOST RECENT HEMOGLOBIN A1C LEVEL <7.0%: ICD-10-PCS | Mod: CPTII,S$GLB,, | Performed by: NURSE PRACTITIONER

## 2022-01-10 PROCEDURE — 1125F AMNT PAIN NOTED PAIN PRSNT: CPT | Mod: CPTII,S$GLB,, | Performed by: NURSE PRACTITIONER

## 2022-01-10 PROCEDURE — 3288F FALL RISK ASSESSMENT DOCD: CPT | Mod: CPTII,S$GLB,, | Performed by: NURSE PRACTITIONER

## 2022-01-10 PROCEDURE — 3078F PR MOST RECENT DIASTOLIC BLOOD PRESSURE < 80 MM HG: ICD-10-PCS | Mod: CPTII,S$GLB,, | Performed by: NURSE PRACTITIONER

## 2022-01-10 PROCEDURE — 1125F PR PAIN SEVERITY QUANTIFIED, PAIN PRESENT: ICD-10-PCS | Mod: CPTII,S$GLB,, | Performed by: NURSE PRACTITIONER

## 2022-01-10 PROCEDURE — 1159F PR MEDICATION LIST DOCUMENTED IN MEDICAL RECORD: ICD-10-PCS | Mod: CPTII,S$GLB,, | Performed by: NURSE PRACTITIONER

## 2022-01-10 PROCEDURE — 1160F RVW MEDS BY RX/DR IN RCRD: CPT | Mod: CPTII,S$GLB,, | Performed by: NURSE PRACTITIONER

## 2022-01-10 NOTE — PATIENT INSTRUCTIONS
Diabetes   She reports she is unable to give up sweets. We will perform a professional continuous glucose monitor in order to assess blood sugar trends and patterns, highs and lows, and determine treatment plan.      Continue   Trulicity 4.5 mg weekly  Glipizide 10 mg twice daily  basaglar 25 units daily      Osteoporosis               Continue Vitamin D 1000 IU twice weekly               RDA of calcium is 5391-8117 mg daily, preferable from food               Avoid alcohol and tobacco               Continue fosamax   Check bone density in May 2023

## 2022-01-10 NOTE — ASSESSMENT & PLAN NOTE
-- Labs prior  -- A1c 7-7.5% without hypoglycemia. At goal.  -- Medication Changes:      She reports she is unable to give up sweets. We will perform a professional continuous glucose monitor in order to assess blood sugar trends and patterns, highs and lows, and determine treatment plan.      Continue   Trulicity 4.5 mg weekly  Glipizide 10 mg twice daily  basaglar 25 units daily     Message me for any blood sugar less than 70 and we will adjustments to your regimen. Message me for persistent blood sugars above 180.  -- We will continue novolog for steroid injections. Discussed she will take the novolog before meals with sliding scale below.  With using correction scale:  MDD of:   150-200: +2 units  201-250: +4 units  251-300: +6 units  301-350: +8 units  >350: +10 units    -- Has GVOKE pen.   -- She is fearful of DKA Discussed signs and symptoms of DKA and instructed to buy ketone strips  -- Reviewed goals of therapy are to get the best control we can without hypoglycemia  -- Insulin injection sites and proper rotation instructed.    -- Advised frequent self blood glucose monitoring.  Patient encouraged to document glucose results and bring them to every clinic visit.  -- Hypoglycemia precautions discussed. Instructed on precautions before driving.    -- Call for Bg repeatedly < 90 or > 180.   -- Close adherence to lifestyle changes recommended.   -- Periodic follow ups for eye evaluations, foot care and dental care suggested. UTD

## 2022-01-10 NOTE — PROGRESS NOTES
Patient, Kaylee Romero (MRN #[id number]), presented with a recorded BMI of 43.71 kg/m^2 consistent with the definition of morbid obesity (ICD-10 E66.01). The patient's morbid obesity was monitored, evaluated, addressed and/or treated. This addendum to the medical record is made on 01/10/2022.

## 2022-01-10 NOTE — ASSESSMENT & PLAN NOTE
-- Continue fosamax weekly  -- Reviewed basics of quantity versus quality  -- Reassuring she is not fracturing   -- RDA of calcium (4466-1037 mg /day in divided doses) and vitamin D 2000iu a day  discussed  -- Calcium from food sources preferred  -- Fall precautions emphasized  -- +weight bearing exercise  -- Repeat DEXA scan in May 2023

## 2022-01-12 ENCOUNTER — TELEPHONE (OUTPATIENT)
Dept: ENDOCRINOLOGY | Facility: CLINIC | Age: 77
End: 2022-01-12
Payer: MEDICARE

## 2022-01-12 NOTE — TELEPHONE ENCOUNTER
----- Message from Carley Gurrola sent at 1/12/2022  4:16 PM CST -----  Regarding: reschedule  Contact: [phone]  Reschedule Request    Who called:Kaylee   Date/Time/Type: 1/18/2022  1:30 pm   Contact # [phone]   Pt needing a later time

## 2022-01-13 ENCOUNTER — TELEPHONE (OUTPATIENT)
Dept: PHARMACY | Facility: CLINIC | Age: 77
End: 2022-01-13
Payer: MEDICARE

## 2022-01-13 NOTE — TELEPHONE ENCOUNTER
Patient re-enroll with [facility])until 12/31/2022.  Medication will be shipped to patients home within 7-10 business days.

## 2022-01-14 ENCOUNTER — PATIENT MESSAGE (OUTPATIENT)
Dept: PRIMARY CARE CLINIC | Facility: CLINIC | Age: 77
End: 2022-01-14
Payer: MEDICARE

## 2022-01-18 ENCOUNTER — CLINICAL SUPPORT (OUTPATIENT)
Dept: ENDOCRINOLOGY | Facility: CLINIC | Age: 77
End: 2022-01-18
Payer: MEDICARE

## 2022-01-18 ENCOUNTER — PATIENT MESSAGE (OUTPATIENT)
Dept: ADMINISTRATIVE | Facility: HOSPITAL | Age: 77
End: 2022-01-18
Payer: MEDICARE

## 2022-01-18 DIAGNOSIS — E11.22 TYPE 2 DIABETES MELLITUS WITH STAGE 3 CHRONIC KIDNEY DISEASE AND HYPERTENSION: ICD-10-CM

## 2022-01-18 DIAGNOSIS — I12.9 TYPE 2 DIABETES MELLITUS WITH STAGE 3 CHRONIC KIDNEY DISEASE AND HYPERTENSION: ICD-10-CM

## 2022-01-18 DIAGNOSIS — N18.30 TYPE 2 DIABETES MELLITUS WITH STAGE 3 CHRONIC KIDNEY DISEASE AND HYPERTENSION: ICD-10-CM

## 2022-01-18 NOTE — PROGRESS NOTES
"DIABETES EDUCATOR NOTE   PLACEMENT OF DEXCOM G6 PRO SENSOR  CONTINOUS GLUCOSE MONITORING SYSTEM (CGMS)     Patient is here in clinic today for placement of continuous glucose monitoring sensor.                Each patient verified that they were here for CGMS procedure ordered by their provider and that they have a working glucose meter and supplies at home.   Patient will be provided with a Dexcom G6 Pro sensor, transmitter, and a copy of the Continuous Glucose Monitoring Patient Log to fill out during the study.              A detailed  explanation of Continuous Glucose Monitoring was  provided. Patient informed that this is a blind procedure and that they will not actually see the blood sugar tracing in real time.    Instructed patient to check blood sugar using home glucometer and to record the following on provided patient log sheets:Blood sugar taken at home, Meals and snacks, Activity, and Diabetes medications taken and dosage               Patient was brought to a private location.  Site selected and prepared and allowed to dry. Glucose Transmitter Serial Number [id number]  was inserted to patient's abdomen.               The following forms  were given and reviewed in detail with patient and all questions answered.   · Continuous Glucose Monitoring Patient Log   · Dexcom G6 PRO Patient Handout "Blinded CGM Patient Handout"                 Instructions: Time: 15 min   Insertion of sensor:  Time: 5 minutes     "

## 2022-01-19 ENCOUNTER — PES CALL (OUTPATIENT)
Dept: ADMINISTRATIVE | Facility: CLINIC | Age: 77
End: 2022-01-19
Payer: MEDICARE

## 2022-01-24 ENCOUNTER — CLINICAL SUPPORT (OUTPATIENT)
Dept: ENDOCRINOLOGY | Facility: CLINIC | Age: 77
End: 2022-01-24
Payer: MEDICARE

## 2022-01-24 ENCOUNTER — PATIENT MESSAGE (OUTPATIENT)
Dept: ENDOCRINOLOGY | Facility: CLINIC | Age: 77
End: 2022-01-24

## 2022-01-24 DIAGNOSIS — E11.65 UNCONTROLLED TYPE 2 DIABETES MELLITUS WITH HYPERGLYCEMIA: ICD-10-CM

## 2022-01-24 DIAGNOSIS — N18.30 TYPE 2 DIABETES MELLITUS WITH STAGE 3 CHRONIC KIDNEY DISEASE AND HYPERTENSION: ICD-10-CM

## 2022-01-24 DIAGNOSIS — I12.9 TYPE 2 DIABETES MELLITUS WITH STAGE 3 CHRONIC KIDNEY DISEASE AND HYPERTENSION: ICD-10-CM

## 2022-01-24 DIAGNOSIS — E11.22 TYPE 2 DIABETES MELLITUS WITH STAGE 3 CHRONIC KIDNEY DISEASE AND HYPERTENSION: ICD-10-CM

## 2022-01-24 PROCEDURE — 95250 PR GLUCOSE MONITORING,72 HRS,SUB-Q SENSOR: ICD-10-PCS | Mod: S$GLB,,, | Performed by: NURSE PRACTITIONER

## 2022-01-24 PROCEDURE — 95250 CONT GLUC MNTR PHYS/QHP EQP: CPT | Mod: S$GLB,,, | Performed by: NURSE PRACTITIONER

## 2022-01-24 PROCEDURE — 95251 CONT GLUC MNTR ANALYSIS I&R: CPT | Mod: S$GLB,,, | Performed by: NURSE PRACTITIONER

## 2022-01-24 PROCEDURE — 95251 PR GLUCOSE MONITOR, 72 HOUR, PHYS INTERP: ICD-10-PCS | Mod: S$GLB,,, | Performed by: NURSE PRACTITIONER

## 2022-01-24 RX ORDER — GLIPIZIDE 5 MG/1
10 TABLET ORAL 2 TIMES DAILY WITH MEALS
Qty: 120 TABLET | Refills: 8 | Status: SHIPPED | OUTPATIENT
Start: 2022-01-24 | End: 2022-01-25 | Stop reason: SDUPTHER

## 2022-01-25 DIAGNOSIS — E11.22 TYPE 2 DIABETES MELLITUS WITH STAGE 3 CHRONIC KIDNEY DISEASE AND HYPERTENSION: ICD-10-CM

## 2022-01-25 DIAGNOSIS — N18.30 TYPE 2 DIABETES MELLITUS WITH STAGE 3 CHRONIC KIDNEY DISEASE AND HYPERTENSION: ICD-10-CM

## 2022-01-25 DIAGNOSIS — I12.9 TYPE 2 DIABETES MELLITUS WITH STAGE 3 CHRONIC KIDNEY DISEASE AND HYPERTENSION: ICD-10-CM

## 2022-01-25 RX ORDER — GLIPIZIDE 5 MG/1
10 TABLET ORAL 2 TIMES DAILY WITH MEALS
Qty: 120 TABLET | Refills: 8 | Status: SHIPPED | OUTPATIENT
Start: 2022-01-25 | End: 2022-12-30

## 2022-01-28 ENCOUNTER — PATIENT MESSAGE (OUTPATIENT)
Dept: ENDOCRINOLOGY | Facility: CLINIC | Age: 77
End: 2022-01-28
Payer: MEDICARE

## 2022-01-28 NOTE — TELEPHONE ENCOUNTER
Continuous Glucose Sensor Report     Ordering Provider:  BENEDICT Fox NP     Interpreting Provider: BENEDICT Fox NP and BENEDICT Power MD     Date of this outpatient clinic study: This sensor was put in place on 1/18/2022 and taken off on 1/25/2022       Reason for CGMS: Concern for hypoglycemia on current regimen.     Jan 2022   A1c 6.3%   Type of diabetes: 2     Current diabetes medications and doses:   Trulicity 4.5 mg weekly   Glipizide 10 mg twice daily   basaglar 25 units daily      Blood glucose, food and exercise log filled out consistently: Blood glucose checked 1-2 times  daily. Food and exercise documented.     ______________________________________________________________     Medication/Diet/Exercise-related patterns/events:     Medication adherence: no medication administration documented but blood sugar declines appropriately as medication taken     Diet: heavy sugar carbohydrate diet -she is consuming something sweet daily- brownie, mary cake, hot chocolate, chocolate candy, ice cream. She consumed 2 glasses of champagne on 2 occasions. Prolonged postprandial hyperglycemia associated with most meals.     Exercise patterns: Cubii exercise for 20-30 minutes a couple of times weekly. No significant decline in blood glucose after exercise       ______________________________________________________________     Patterns/ Events:     Hypoglycemia: 1%   Possible precipitating events: compression low; sensor warm up     Hyperglycemia:  31%   Possible precipitating events: dietary indiscretions     ________________________________________________________________     SUMMARY of KEY FINDINGS:       Average glucose: 158   Above 180 mg/dL: 30.9% (Above 250 mg/dL: 4.0%)   Within  mg/dL: 67.5%   Below 70 mg/dL: 1.7%  (Below 54 mg/dL: <1%)       Kaylee Romero is a 77-year-old woman with diagnosis of type 2 diabetes complicated by CKD and hyperglycemia. Hypoglycemia associated with sensor warm up/compression low. She is  having prolonged postprandial hyperglycemia after heavy carbohydrate/sugar meals. She has lower end of normal blood glucose overnight and into daytime hours while she is asleep.     Would aim to avoid hypoglycemia in this 77 year old woman.     Chart review reveals she does not want SGLT2 due to fear of SE profile and she is unwilling to change her dietary indiscretions.     RECOMMENDATIONS:     Diabetes education referral for comprehensive education visit.       Confirm medication compliance - administration technique, timing of administration, administration site.     Patient would benefit from personal CGM given glucose variability.     Medication Changes   Continue   Trulicity 4.5 mg weekly   Glipizide 10 mg twice daily with meals   Decrease   basaglar to 22 units daily

## 2022-01-29 ENCOUNTER — PATIENT MESSAGE (OUTPATIENT)
Dept: ENDOCRINOLOGY | Facility: CLINIC | Age: 77
End: 2022-01-29
Payer: MEDICARE

## 2022-02-01 ENCOUNTER — PATIENT MESSAGE (OUTPATIENT)
Dept: OTHER | Facility: OTHER | Age: 77
End: 2022-02-01
Payer: MEDICARE

## 2022-02-02 ENCOUNTER — HOSPITAL ENCOUNTER (OUTPATIENT)
Dept: RADIOLOGY | Facility: HOSPITAL | Age: 77
Discharge: HOME | End: 2022-02-02
Attending: INTERNAL MEDICINE
Payer: MEDICARE

## 2022-02-02 DIAGNOSIS — I12.9 TYPE 2 DIABETES MELLITUS WITH STAGE 3 CHRONIC KIDNEY DISEASE AND HYPERTENSION: ICD-10-CM

## 2022-02-02 DIAGNOSIS — N18.30 TYPE 2 DIABETES MELLITUS WITH STAGE 3 CHRONIC KIDNEY DISEASE AND HYPERTENSION: ICD-10-CM

## 2022-02-02 DIAGNOSIS — E11.22 TYPE 2 DIABETES MELLITUS WITH STAGE 3 CHRONIC KIDNEY DISEASE AND HYPERTENSION: ICD-10-CM

## 2022-02-02 DIAGNOSIS — N18.31 STAGE 3A CHRONIC KIDNEY DISEASE: ICD-10-CM

## 2022-02-02 DIAGNOSIS — I10 PRIMARY HYPERTENSION: ICD-10-CM

## 2022-02-02 PROCEDURE — 76770 US EXAM ABDO BACK WALL COMP: CPT | Mod: 26,,, | Performed by: RADIOLOGY

## 2022-02-02 PROCEDURE — 76770 US EXAM ABDO BACK WALL COMP: CPT | Mod: TC

## 2022-02-02 PROCEDURE — 76770 US RETROPERITONEAL COMPLETE: ICD-10-PCS | Mod: 26,,, | Performed by: RADIOLOGY

## 2022-02-07 DIAGNOSIS — E11.21 TYPE 2 DIABETES MELLITUS WITH MACROALBUMINURIC DIABETIC NEPHROPATHY: ICD-10-CM

## 2022-02-07 NOTE — TELEPHONE ENCOUNTER
No new care gaps identified.  Powered by [employer]. Reference number: [id number].   2/07/2022 3:10:08 PM CST

## 2022-02-07 NOTE — TELEPHONE ENCOUNTER
----- Message from Florina Bradley sent at 2/7/2022  2:58 PM CST -----  Contact: Patient  Type:  RX Refill Request    Who Called: Patient    Refill or New Rx: refill    RX Name and Strength: blood sugar diagnostic Strp    How is the patient currently taking it? (ex. 1XDay)    Is this a 30 day or 90 day RX:    Preferred Pharmacy with phone number:   [id number] - [address] [address]  Phone: [phone] Fax: [phone]      Local or Mail Order:    Ordering Provider:    Would the patient rather a call back or a response via [facility]?    Best Call Back Number: [phone]    Additional Information:needs a refill for testing strip

## 2022-02-12 PROBLEM — I50.32 CHRONIC HEART FAILURE WITH PRESERVED EJECTION FRACTION: Status: ACTIVE | Noted: 2022-02-12

## 2022-02-12 NOTE — PROGRESS NOTES
Subjective:       Patient ID: Kaylee Romero is a 77 y.o. female.    Chief Complaint: No chief complaint on file.    HPI     77 year old female with CAD s/p 2v CABG (LIMA-LAD and SVG-OM in 8/2019) with subsequent 90% stenosis of distal LIMA graft s/p PCI of high grade LCx and RCA lesions, HFpEF, HTN, HLD, DM2, CKD III, KAMRAN on CPAP, TIA, obesity here for follow-up. She underwent repeat PET in 2021 which showed ischemia in the territory of D1 and subsequent angiography revealed patent grafts and no new focal stenoses requiring intervention.    She has not been very physically active but plans to return to using her floor bike for exercise. She is no longer experiencing angina with routine daily activities. She has been measuring her weights which have been stable with lasix 3 times weekly. She has been eating a low sodium diet but eats out occasionally. She sleeps with a CPAP and does not report orthopnea or PND. We discussed exercising and she will commit to using her floor bike at least 3-4 times per week for at least 5 minutes. She wants to go to PT before cardiac rehab.    Review of Systems   All other systems reviewed and are negative.      Objective:       Vitals:    02/14/22 1426   BP: 124/70   Pulse: 85       Physical Exam  Constitutional:       Appearance: She is well-developed. She is not diaphoretic.   HENT:      Head: Normocephalic and atraumatic.   Eyes:      Pupils: Pupils are equal, round, and reactive to light.   Neck:      Vascular: No carotid bruit or JVD.   Cardiovascular:      Rate and Rhythm: Normal rate and regular rhythm.      Heart sounds: Normal heart sounds. Heart sounds not distant. No murmur heard.  No friction rub. No gallop. No S3 or S4 sounds.       Comments: Sternotomy, healed  Pulmonary:      Effort: Pulmonary effort is normal. No respiratory distress.      Breath sounds: Normal breath sounds. No wheezing.   Abdominal:      General: Bowel sounds are normal. There is no distension.       Palpations: Abdomen is soft.      Tenderness: There is no abdominal tenderness.   Musculoskeletal:         General: No swelling.      Cervical back: Normal range of motion.   Skin:     General: Skin is warm.      Findings: No erythema.   Neurological:      Mental Status: She is alert and oriented to person, place, and time.   Psychiatric:         Behavior: Behavior normal.         Assessment:       1. 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 S/P CABG (coronary artery bypass graft)    3. S/P drug eluting coronary stent placement    4. Chronic heart failure with preserved ejection fraction    5. Essential hypertension    6. Dyslipidemia, goal LDL below 70    7. History of TIA (transient ischemic attack)    8. Stage 3a chronic kidney disease    9. Type 2 diabetes mellitus with stage 3 chronic kidney disease and hypertension    10. KAMRAN on CPAP        Plan:       CAD s/p CABG s/p BEA  Complicated history of CAD with residual ischemia on follow-up PET stress, however subsequent coronary angiography did not reveal any new focal stenosis to explain symptoms  Continue ASA, high intensity statin, beta blocker, ARB, imdur  Mediterranean style diet provided    HFpEF  Stage C, NYHA II-III  EF 60%  Counseled regarding daily weights and how to increase lasix PRN, sodium and fluid restrictions  We discussed exercising and she will commit to using her floor bike at least 3-4 times per week for at least 5 minutes. She wants to go to PT before cardiac rehab.    Essential hypertension  Well controlled on current therapy    Dyslipidemia, goal LDL below 70  LDL at goal  Continue statin    History of TIA (transient ischemic attack)  Continue secondary prevention measures as above    Stage 3a chronic kidney disease  Needs good BP/glycemic control    Type 2 diabetes mellitus with stage 3 chronic kidney disease and hypertension  Followed by PCP    Morbid obesity with body mass index (BMI)  of 40.0 or higher  Discussed diet/lifestyle modification    KAMRAN on CPAP  Continue CPAP

## 2022-02-14 ENCOUNTER — OFFICE VISIT (OUTPATIENT)
Dept: CARDIOLOGY | Facility: CLINIC | Age: 77
End: 2022-02-14
Payer: MEDICARE

## 2022-02-14 ENCOUNTER — PATIENT MESSAGE (OUTPATIENT)
Dept: NEPHROLOGY | Facility: CLINIC | Age: 77
End: 2022-02-14
Payer: MEDICARE

## 2022-02-14 VITALS
BODY MASS INDEX: 44.01 KG/M2 | SYSTOLIC BLOOD PRESSURE: 124 MMHG | HEIGHT: 62 IN | WEIGHT: 239.19 LBS | HEART RATE: 85 BPM | DIASTOLIC BLOOD PRESSURE: 70 MMHG

## 2022-02-14 DIAGNOSIS — G47.33 OSA ON CPAP: ICD-10-CM

## 2022-02-14 DIAGNOSIS — I10 ESSENTIAL HYPERTENSION: ICD-10-CM

## 2022-02-14 DIAGNOSIS — N18.31 STAGE 3A CHRONIC KIDNEY DISEASE: ICD-10-CM

## 2022-02-14 DIAGNOSIS — E78.5 DYSLIPIDEMIA, GOAL LDL BELOW 70: ICD-10-CM

## 2022-02-14 DIAGNOSIS — Z95.1 S/P CABG (CORONARY ARTERY BYPASS GRAFT): ICD-10-CM

## 2022-02-14 DIAGNOSIS — E11.22 TYPE 2 DIABETES MELLITUS WITH STAGE 3 CHRONIC KIDNEY DISEASE AND HYPERTENSION: ICD-10-CM

## 2022-02-14 DIAGNOSIS — I25.708 CORONARY ARTERY DISEASE OF BYPASS GRAFT OF NATIVE HEART WITH STABLE ANGINA PECTORIS: Primary | Chronic | ICD-10-CM

## 2022-02-14 DIAGNOSIS — I50.32 CHRONIC HEART FAILURE WITH PRESERVED EJECTION FRACTION: ICD-10-CM

## 2022-02-14 DIAGNOSIS — Z86.73 HISTORY OF TIA (TRANSIENT ISCHEMIC ATTACK): ICD-10-CM

## 2022-02-14 DIAGNOSIS — N18.30 TYPE 2 DIABETES MELLITUS WITH STAGE 3 CHRONIC KIDNEY DISEASE AND HYPERTENSION: ICD-10-CM

## 2022-02-14 DIAGNOSIS — I12.9 TYPE 2 DIABETES MELLITUS WITH STAGE 3 CHRONIC KIDNEY DISEASE AND HYPERTENSION: ICD-10-CM

## 2022-02-14 DIAGNOSIS — Z95.5 S/P DRUG ELUTING CORONARY STENT PLACEMENT: ICD-10-CM

## 2022-02-14 PROCEDURE — 3078F DIAST BP <80 MM HG: CPT | Mod: CPTII,S$GLB,, | Performed by: INTERNAL MEDICINE

## 2022-02-14 PROCEDURE — 1101F PT FALLS ASSESS-DOCD LE1/YR: CPT | Mod: CPTII,S$GLB,, | Performed by: INTERNAL MEDICINE

## 2022-02-14 PROCEDURE — 99499 UNLISTED E&M SERVICE: CPT | Mod: S$GLB,,, | Performed by: INTERNAL MEDICINE

## 2022-02-14 PROCEDURE — 1159F MED LIST DOCD IN RCRD: CPT | Mod: CPTII,S$GLB,, | Performed by: INTERNAL MEDICINE

## 2022-02-14 PROCEDURE — 1126F AMNT PAIN NOTED NONE PRSNT: CPT | Mod: CPTII,S$GLB,, | Performed by: INTERNAL MEDICINE

## 2022-02-14 PROCEDURE — 3078F PR MOST RECENT DIASTOLIC BLOOD PRESSURE < 80 MM HG: ICD-10-PCS | Mod: CPTII,S$GLB,, | Performed by: INTERNAL MEDICINE

## 2022-02-14 PROCEDURE — 3074F SYST BP LT 130 MM HG: CPT | Mod: CPTII,S$GLB,, | Performed by: INTERNAL MEDICINE

## 2022-02-14 PROCEDURE — 3044F HG A1C LEVEL LT 7.0%: CPT | Mod: CPTII,S$GLB,, | Performed by: INTERNAL MEDICINE

## 2022-02-14 PROCEDURE — 99499 RISK ADDL DX/OHS AUDIT: ICD-10-PCS | Mod: S$GLB,,, | Performed by: INTERNAL MEDICINE

## 2022-02-14 PROCEDURE — 3288F PR FALLS RISK ASSESSMENT DOCUMENTED: ICD-10-PCS | Mod: CPTII,S$GLB,, | Performed by: INTERNAL MEDICINE

## 2022-02-14 PROCEDURE — 99999 PR PBB SHADOW E&M-EST. PATIENT-LVL V: CPT | Mod: PBBFAC,,, | Performed by: INTERNAL MEDICINE

## 2022-02-14 PROCEDURE — 1159F PR MEDICATION LIST DOCUMENTED IN MEDICAL RECORD: ICD-10-PCS | Mod: CPTII,S$GLB,, | Performed by: INTERNAL MEDICINE

## 2022-02-14 PROCEDURE — 99214 OFFICE O/P EST MOD 30 MIN: CPT | Mod: S$GLB,,, | Performed by: INTERNAL MEDICINE

## 2022-02-14 PROCEDURE — 3288F FALL RISK ASSESSMENT DOCD: CPT | Mod: CPTII,S$GLB,, | Performed by: INTERNAL MEDICINE

## 2022-02-14 PROCEDURE — 1101F PR PT FALLS ASSESS DOC 0-1 FALLS W/OUT INJ PAST YR: ICD-10-PCS | Mod: CPTII,S$GLB,, | Performed by: INTERNAL MEDICINE

## 2022-02-14 PROCEDURE — 1126F PR PAIN SEVERITY QUANTIFIED, NO PAIN PRESENT: ICD-10-PCS | Mod: CPTII,S$GLB,, | Performed by: INTERNAL MEDICINE

## 2022-02-14 PROCEDURE — 99999 PR PBB SHADOW E&M-EST. PATIENT-LVL V: ICD-10-PCS | Mod: PBBFAC,,, | Performed by: INTERNAL MEDICINE

## 2022-02-14 PROCEDURE — 3044F PR MOST RECENT HEMOGLOBIN A1C LEVEL <7.0%: ICD-10-PCS | Mod: CPTII,S$GLB,, | Performed by: INTERNAL MEDICINE

## 2022-02-14 PROCEDURE — 99214 PR OFFICE/OUTPT VISIT, EST, LEVL IV, 30-39 MIN: ICD-10-PCS | Mod: S$GLB,,, | Performed by: INTERNAL MEDICINE

## 2022-02-14 PROCEDURE — 3074F PR MOST RECENT SYSTOLIC BLOOD PRESSURE < 130 MM HG: ICD-10-PCS | Mod: CPTII,S$GLB,, | Performed by: INTERNAL MEDICINE

## 2022-02-17 ENCOUNTER — PATIENT MESSAGE (OUTPATIENT)
Dept: PRIMARY CARE CLINIC | Facility: CLINIC | Age: 77
End: 2022-02-17
Payer: MEDICARE

## 2022-02-17 DIAGNOSIS — I11.9 HYPERTENSIVE HEART DISEASE WITHOUT HEART FAILURE: ICD-10-CM

## 2022-02-17 DIAGNOSIS — R54 AGE-RELATED PHYSICAL DEBILITY: Primary | ICD-10-CM

## 2022-02-21 ENCOUNTER — PATIENT MESSAGE (OUTPATIENT)
Dept: CARDIOLOGY | Facility: CLINIC | Age: 77
End: 2022-02-21
Payer: MEDICARE

## 2022-02-21 ENCOUNTER — LAB VISIT (OUTPATIENT)
Dept: LAB | Facility: HOSPITAL | Age: 77
End: 2022-02-21
Attending: INTERNAL MEDICINE
Payer: MEDICARE

## 2022-02-21 DIAGNOSIS — N18.30 TYPE 2 DIABETES MELLITUS WITH STAGE 3 CHRONIC KIDNEY DISEASE AND HYPERTENSION: ICD-10-CM

## 2022-02-21 DIAGNOSIS — E11.22 TYPE 2 DIABETES MELLITUS WITH STAGE 3 CHRONIC KIDNEY DISEASE AND HYPERTENSION: ICD-10-CM

## 2022-02-21 DIAGNOSIS — N18.31 STAGE 3A CHRONIC KIDNEY DISEASE: ICD-10-CM

## 2022-02-21 DIAGNOSIS — I12.9 TYPE 2 DIABETES MELLITUS WITH STAGE 3 CHRONIC KIDNEY DISEASE AND HYPERTENSION: ICD-10-CM

## 2022-02-21 DIAGNOSIS — I10 PRIMARY HYPERTENSION: ICD-10-CM

## 2022-02-21 PROCEDURE — 84100 ASSAY OF PHOSPHORUS: CPT | Performed by: INTERNAL MEDICINE

## 2022-02-21 PROCEDURE — 36415 COLL VENOUS BLD VENIPUNCTURE: CPT | Mod: PN | Performed by: INTERNAL MEDICINE

## 2022-02-21 PROCEDURE — 80053 COMPREHEN METABOLIC PANEL: CPT | Performed by: INTERNAL MEDICINE

## 2022-02-21 PROCEDURE — 85025 COMPLETE CBC W/AUTO DIFF WBC: CPT | Performed by: INTERNAL MEDICINE

## 2022-02-22 ENCOUNTER — PATIENT MESSAGE (OUTPATIENT)
Dept: ENDOCRINOLOGY | Facility: CLINIC | Age: 77
End: 2022-02-22
Payer: MEDICARE

## 2022-02-22 ENCOUNTER — LAB VISIT (OUTPATIENT)
Dept: LAB | Facility: HOSPITAL | Age: 77
End: 2022-02-22
Attending: INTERNAL MEDICINE
Payer: MEDICARE

## 2022-02-22 DIAGNOSIS — Z12.11 ENCOUNTER FOR COLORECTAL CANCER SCREENING: ICD-10-CM

## 2022-02-22 DIAGNOSIS — Z12.12 ENCOUNTER FOR COLORECTAL CANCER SCREENING: ICD-10-CM

## 2022-02-22 LAB
ALBUMIN SERPL BCP-MCNC: 3.5 G/DL (ref 3.5–5.2)
ALP SERPL-CCNC: 80 U/L (ref 55–135)
ALT SERPL W/O P-5'-P-CCNC: 20 U/L (ref 10–44)
ANION GAP SERPL CALC-SCNC: 10 MMOL/L (ref 8–16)
AST SERPL-CCNC: 17 U/L (ref 10–40)
BASOPHILS # BLD AUTO: 0.04 K/UL (ref 0–0.2)
BASOPHILS NFR BLD: 0.6 % (ref 0–1.9)
BILIRUB SERPL-MCNC: 0.5 MG/DL (ref 0.1–1)
BUN SERPL-MCNC: 15 MG/DL (ref 8–23)
CALCIUM SERPL-MCNC: 10.5 MG/DL (ref 8.7–10.5)
CHLORIDE SERPL-SCNC: 100 MMOL/L (ref 95–110)
CO2 SERPL-SCNC: 25 MMOL/L (ref 23–29)
CREAT SERPL-MCNC: 1.2 MG/DL (ref 0.5–1.4)
DIFFERENTIAL METHOD: ABNORMAL
EOSINOPHIL # BLD AUTO: 0.2 K/UL (ref 0–0.5)
EOSINOPHIL NFR BLD: 3 % (ref 0–8)
ERYTHROCYTE [DISTWIDTH] IN BLOOD BY AUTOMATED COUNT: 13.8 % (ref 11.5–14.5)
EST. GFR  (AFRICAN AMERICAN): 51.1 ML/MIN/1.73 M^2
EST. GFR  (NON AFRICAN AMERICAN): 44.3 ML/MIN/1.73 M^2
GLUCOSE SERPL-MCNC: 98 MG/DL (ref 70–110)
HCT VFR BLD AUTO: 37.2 % (ref 37–48.5)
HGB BLD-MCNC: 11.6 G/DL (ref 12–16)
IMM GRANULOCYTES # BLD AUTO: 0.02 K/UL (ref 0–0.04)
IMM GRANULOCYTES NFR BLD AUTO: 0.3 % (ref 0–0.5)
LYMPHOCYTES # BLD AUTO: 1.2 K/UL (ref 1–4.8)
LYMPHOCYTES NFR BLD: 19.6 % (ref 18–48)
MCH RBC QN AUTO: 27.8 PG (ref 27–31)
MCHC RBC AUTO-ENTMCNC: 31.2 G/DL (ref 32–36)
MCV RBC AUTO: 89 FL (ref 82–98)
MONOCYTES # BLD AUTO: 0.5 K/UL (ref 0.3–1)
MONOCYTES NFR BLD: 8.3 % (ref 4–15)
NEUTROPHILS # BLD AUTO: 4.3 K/UL (ref 1.8–7.7)
NEUTROPHILS NFR BLD: 68.2 % (ref 38–73)
NRBC BLD-RTO: 0 /100 WBC
PHOSPHATE SERPL-MCNC: 3.4 MG/DL (ref 2.7–4.5)
PLATELET # BLD AUTO: 211 K/UL (ref 150–450)
PMV BLD AUTO: 11.3 FL (ref 9.2–12.9)
POTASSIUM SERPL-SCNC: 4 MMOL/L (ref 3.5–5.1)
PROT SERPL-MCNC: 8.5 G/DL (ref 6–8.4)
RBC # BLD AUTO: 4.18 M/UL (ref 4–5.4)
SODIUM SERPL-SCNC: 135 MMOL/L (ref 136–145)
WBC # BLD AUTO: 6.28 K/UL (ref 3.9–12.7)

## 2022-02-22 PROCEDURE — 82274 ASSAY TEST FOR BLOOD FECAL: CPT | Performed by: INTERNAL MEDICINE

## 2022-02-23 DIAGNOSIS — N18.30 CKD STAGE 3 DUE TO TYPE 2 DIABETES MELLITUS: Primary | ICD-10-CM

## 2022-02-23 DIAGNOSIS — E78.5 DYSLIPIDEMIA, GOAL LDL BELOW 70: ICD-10-CM

## 2022-02-23 DIAGNOSIS — E83.52 HYPERCALCEMIA: ICD-10-CM

## 2022-02-23 DIAGNOSIS — E11.22 CKD STAGE 3 DUE TO TYPE 2 DIABETES MELLITUS: Primary | ICD-10-CM

## 2022-02-24 LAB — HEMOCCULT STL QL IA: NEGATIVE

## 2022-03-17 NOTE — PROGRESS NOTES
Subjective:       Patient ID: Kaylee Romero is a 77 y.o. female.    Chief Complaint: No chief complaint on file.    HPI     77 year old female with CAD s/p 2v CABG (LIMA-LAD and SVG-OM in 8/2019) with subsequent 90% stenosis of distal LIMA graft s/p PCI of high grade LCx and RCA lesions, HFpEF, HTN, HLD, DM2, CKD III, KAMRAN on CPAP, TIA, obesity here for follow-up. She underwent repeat PET in 2021 which showed ischemia in the territory of D1 and subsequent angiography revealed patent grafts and no new focal stenoses requiring intervention.    She reports feeling fairly well overall. She has begun PT twice weekly for one hour and is tolerating these sessions.  She has been limited due to orthopedic (knee) issues. She lost electricity last week and could not sleep without CPAP; she developed angina and needed to take NTG. She otherwise has not been experiencing angina. She has stable NYHA II-III dyspnea on exertion and has not noticed any recent peripheral edema. She denies palpitations or syncope.    She is doing well with lasix 3 times per week and feels clinically stable. She continues to eat out occasionally but not as much as previously. She has been exercising more and using her floor bike regularly.     Review of Systems   Constitutional: Negative for chills and fever.   HENT: Negative for nosebleeds.    Eyes: Positive for visual disturbance.   Respiratory: Positive for shortness of breath.    Cardiovascular: Negative for leg swelling.   Gastrointestinal: Negative for blood in stool.   Genitourinary: Negative for hematuria.   Musculoskeletal: Positive for gait problem.   Skin: Negative for rash.   Neurological: Negative for syncope.   All other systems reviewed and are negative.      Objective:       Vitals:    03/31/22 1355   BP: 115/61   Pulse: 83       Physical Exam  Constitutional:       Appearance: She is well-developed. She is not diaphoretic.   HENT:      Head: Normocephalic and atraumatic.   Eyes:       Pupils: Pupils are equal, round, and reactive to light.   Neck:      Vascular: No carotid bruit or JVD.   Cardiovascular:      Rate and Rhythm: Normal rate and regular rhythm.      Heart sounds: Heart sounds not distant. Murmur (Grade II/VI systolic murmur loudest at RUSB) heard.     No friction rub. No gallop. No S3 or S4 sounds.      Comments: Sternotomy, healed  Pulmonary:      Effort: Pulmonary effort is normal. No respiratory distress.      Breath sounds: Normal breath sounds. No wheezing.   Abdominal:      General: Bowel sounds are normal. There is no distension.      Palpations: Abdomen is soft.      Tenderness: There is no abdominal tenderness.   Musculoskeletal:         General: No swelling.      Cervical back: Normal range of motion.   Skin:     General: Skin is warm.      Findings: No erythema.   Neurological:      Mental Status: She is alert and oriented to person, place, and time.   Psychiatric:         Behavior: Behavior normal.         Assessment:       1. 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 S/P CABG (coronary artery bypass graft)    3. S/P drug eluting coronary stent placement    4. Chronic heart failure with preserved ejection fraction    5. Essential hypertension    6. Type 2 diabetes mellitus with stage 3 chronic kidney disease and hypertension    7. Dyslipidemia, goal LDL below 70    8. History of TIA (transient ischemic attack)    9. Stage 3a chronic kidney disease    10. KAMRAN on CPAP        Plan:       CAD s/p CABG s/p BEA, aortic atherosclerosis  Complicated history of CAD with residual ischemia on follow-up PET stress, however subsequent coronary angiography did not reveal any new focal stenosis to explain symptoms  Continue ASA, high intensity statin, beta blocker, ARB, imdur  Mediterranean style diet information provided previously  New murmur - check TTE, likely due to aortic sclerosis seen prior    HFpEF  Class C, NYHA II-III  EF  60%  Counseled regarding daily weights and how to increase lasix PRN, sodium and fluid restrictions  We discussed exercising and she will commit to using her floor bike at least 3-4 times per week for at least 5 minutes. She wants to go to PT before cardiac rehab.    Essential hypertension  Well controlled on current therapy    Dyslipidemia, goal LDL below 70  LDL at goal  Continue statin    History of TIA (transient ischemic attack)  Continue secondary prevention measures as above    Stage 3a chronic kidney disease  Needs good BP/glycemic control    Type 2 diabetes mellitus with stage 3 chronic kidney disease and hypertension  Followed by PCP    Morbid obesity with body mass index (BMI) of 40.0 or higher  Discussed diet/lifestyle modification    KAMRAN on CPAP  Continue CPAP

## 2022-03-31 ENCOUNTER — OFFICE VISIT (OUTPATIENT)
Dept: CARDIOLOGY | Facility: CLINIC | Age: 77
End: 2022-03-31
Payer: MEDICARE

## 2022-03-31 VITALS
OXYGEN SATURATION: 99 % | BODY MASS INDEX: 43.49 KG/M2 | WEIGHT: 236.31 LBS | SYSTOLIC BLOOD PRESSURE: 115 MMHG | HEIGHT: 62 IN | HEART RATE: 83 BPM | DIASTOLIC BLOOD PRESSURE: 61 MMHG

## 2022-03-31 DIAGNOSIS — Z95.1 S/P CABG (CORONARY ARTERY BYPASS GRAFT): ICD-10-CM

## 2022-03-31 DIAGNOSIS — I10 ESSENTIAL HYPERTENSION: ICD-10-CM

## 2022-03-31 DIAGNOSIS — I12.9 TYPE 2 DIABETES MELLITUS WITH STAGE 3 CHRONIC KIDNEY DISEASE AND HYPERTENSION: ICD-10-CM

## 2022-03-31 DIAGNOSIS — N18.30 TYPE 2 DIABETES MELLITUS WITH STAGE 3 CHRONIC KIDNEY DISEASE AND HYPERTENSION: ICD-10-CM

## 2022-03-31 DIAGNOSIS — I25.708 CORONARY ARTERY DISEASE OF BYPASS GRAFT OF NATIVE HEART WITH STABLE ANGINA PECTORIS: Primary | ICD-10-CM

## 2022-03-31 DIAGNOSIS — Z95.5 S/P DRUG ELUTING CORONARY STENT PLACEMENT: ICD-10-CM

## 2022-03-31 DIAGNOSIS — G47.33 OSA ON CPAP: ICD-10-CM

## 2022-03-31 DIAGNOSIS — N18.31 STAGE 3A CHRONIC KIDNEY DISEASE: ICD-10-CM

## 2022-03-31 DIAGNOSIS — E11.22 TYPE 2 DIABETES MELLITUS WITH STAGE 3 CHRONIC KIDNEY DISEASE AND HYPERTENSION: ICD-10-CM

## 2022-03-31 DIAGNOSIS — I50.32 CHRONIC HEART FAILURE WITH PRESERVED EJECTION FRACTION: ICD-10-CM

## 2022-03-31 DIAGNOSIS — E78.5 DYSLIPIDEMIA, GOAL LDL BELOW 70: ICD-10-CM

## 2022-03-31 DIAGNOSIS — I70.0 AORTIC ATHEROSCLEROSIS: ICD-10-CM

## 2022-03-31 DIAGNOSIS — Z86.73 HISTORY OF TIA (TRANSIENT ISCHEMIC ATTACK): ICD-10-CM

## 2022-03-31 DIAGNOSIS — R01.1 MURMUR: ICD-10-CM

## 2022-03-31 PROCEDURE — 3074F SYST BP LT 130 MM HG: CPT | Mod: CPTII,S$GLB,, | Performed by: INTERNAL MEDICINE

## 2022-03-31 PROCEDURE — 99214 PR OFFICE/OUTPT VISIT, EST, LEVL IV, 30-39 MIN: ICD-10-PCS | Mod: S$GLB,,, | Performed by: INTERNAL MEDICINE

## 2022-03-31 PROCEDURE — 99214 OFFICE O/P EST MOD 30 MIN: CPT | Mod: S$GLB,,, | Performed by: INTERNAL MEDICINE

## 2022-03-31 PROCEDURE — 4010F PR ACE/ARB THEARPY RXD/TAKEN: ICD-10-PCS | Mod: CPTII,S$GLB,, | Performed by: INTERNAL MEDICINE

## 2022-03-31 PROCEDURE — 3061F NEG MICROALBUMINURIA REV: CPT | Mod: CPTII,S$GLB,, | Performed by: INTERNAL MEDICINE

## 2022-03-31 PROCEDURE — 1159F MED LIST DOCD IN RCRD: CPT | Mod: CPTII,S$GLB,, | Performed by: INTERNAL MEDICINE

## 2022-03-31 PROCEDURE — 4010F ACE/ARB THERAPY RXD/TAKEN: CPT | Mod: CPTII,S$GLB,, | Performed by: INTERNAL MEDICINE

## 2022-03-31 PROCEDURE — 1160F RVW MEDS BY RX/DR IN RCRD: CPT | Mod: CPTII,S$GLB,, | Performed by: INTERNAL MEDICINE

## 2022-03-31 PROCEDURE — 1159F PR MEDICATION LIST DOCUMENTED IN MEDICAL RECORD: ICD-10-PCS | Mod: CPTII,S$GLB,, | Performed by: INTERNAL MEDICINE

## 2022-03-31 PROCEDURE — 3044F HG A1C LEVEL LT 7.0%: CPT | Mod: CPTII,S$GLB,, | Performed by: INTERNAL MEDICINE

## 2022-03-31 PROCEDURE — 3044F PR MOST RECENT HEMOGLOBIN A1C LEVEL <7.0%: ICD-10-PCS | Mod: CPTII,S$GLB,, | Performed by: INTERNAL MEDICINE

## 2022-03-31 PROCEDURE — 3066F PR DOCUMENTATION OF TREATMENT FOR NEPHROPATHY: ICD-10-PCS | Mod: CPTII,S$GLB,, | Performed by: INTERNAL MEDICINE

## 2022-03-31 PROCEDURE — 1126F PR PAIN SEVERITY QUANTIFIED, NO PAIN PRESENT: ICD-10-PCS | Mod: CPTII,S$GLB,, | Performed by: INTERNAL MEDICINE

## 2022-03-31 PROCEDURE — 99999 PR PBB SHADOW E&M-EST. PATIENT-LVL V: ICD-10-PCS | Mod: PBBFAC,,, | Performed by: INTERNAL MEDICINE

## 2022-03-31 PROCEDURE — 3066F NEPHROPATHY DOC TX: CPT | Mod: CPTII,S$GLB,, | Performed by: INTERNAL MEDICINE

## 2022-03-31 PROCEDURE — 1126F AMNT PAIN NOTED NONE PRSNT: CPT | Mod: CPTII,S$GLB,, | Performed by: INTERNAL MEDICINE

## 2022-03-31 PROCEDURE — 3078F DIAST BP <80 MM HG: CPT | Mod: CPTII,S$GLB,, | Performed by: INTERNAL MEDICINE

## 2022-03-31 PROCEDURE — 1160F PR REVIEW ALL MEDS BY PRESCRIBER/CLIN PHARMACIST DOCUMENTED: ICD-10-PCS | Mod: CPTII,S$GLB,, | Performed by: INTERNAL MEDICINE

## 2022-03-31 PROCEDURE — 3074F PR MOST RECENT SYSTOLIC BLOOD PRESSURE < 130 MM HG: ICD-10-PCS | Mod: CPTII,S$GLB,, | Performed by: INTERNAL MEDICINE

## 2022-03-31 PROCEDURE — 3078F PR MOST RECENT DIASTOLIC BLOOD PRESSURE < 80 MM HG: ICD-10-PCS | Mod: CPTII,S$GLB,, | Performed by: INTERNAL MEDICINE

## 2022-03-31 PROCEDURE — 3061F PR NEG MICROALBUMINURIA RESULT DOCUMENTED/REVIEW: ICD-10-PCS | Mod: CPTII,S$GLB,, | Performed by: INTERNAL MEDICINE

## 2022-03-31 PROCEDURE — 99999 PR PBB SHADOW E&M-EST. PATIENT-LVL V: CPT | Mod: PBBFAC,,, | Performed by: INTERNAL MEDICINE

## 2022-04-05 ENCOUNTER — OFFICE VISIT (OUTPATIENT)
Dept: PODIATRY | Facility: CLINIC | Age: 77
End: 2022-04-05
Payer: MEDICARE

## 2022-04-05 VITALS
HEART RATE: 90 BPM | WEIGHT: 236.31 LBS | DIASTOLIC BLOOD PRESSURE: 61 MMHG | BODY MASS INDEX: 43.49 KG/M2 | HEIGHT: 62 IN | SYSTOLIC BLOOD PRESSURE: 125 MMHG

## 2022-04-05 DIAGNOSIS — B35.1 DERMATOPHYTOSIS OF NAIL: ICD-10-CM

## 2022-04-05 DIAGNOSIS — E11.49 TYPE II DIABETES MELLITUS WITH NEUROLOGICAL MANIFESTATIONS: Primary | ICD-10-CM

## 2022-04-05 DIAGNOSIS — L84 CORN OR CALLUS: ICD-10-CM

## 2022-04-05 PROCEDURE — 3061F PR NEG MICROALBUMINURIA RESULT DOCUMENTED/REVIEW: ICD-10-PCS | Mod: CPTII,S$GLB,, | Performed by: PODIATRIST

## 2022-04-05 PROCEDURE — 1160F RVW MEDS BY RX/DR IN RCRD: CPT | Mod: CPTII,S$GLB,, | Performed by: PODIATRIST

## 2022-04-05 PROCEDURE — 1159F PR MEDICATION LIST DOCUMENTED IN MEDICAL RECORD: ICD-10-PCS | Mod: CPTII,S$GLB,, | Performed by: PODIATRIST

## 2022-04-05 PROCEDURE — 3074F SYST BP LT 130 MM HG: CPT | Mod: CPTII,S$GLB,, | Performed by: PODIATRIST

## 2022-04-05 PROCEDURE — 1101F PR PT FALLS ASSESS DOC 0-1 FALLS W/OUT INJ PAST YR: ICD-10-PCS | Mod: CPTII,S$GLB,, | Performed by: PODIATRIST

## 2022-04-05 PROCEDURE — 3061F NEG MICROALBUMINURIA REV: CPT | Mod: CPTII,S$GLB,, | Performed by: PODIATRIST

## 2022-04-05 PROCEDURE — 1159F MED LIST DOCD IN RCRD: CPT | Mod: CPTII,S$GLB,, | Performed by: PODIATRIST

## 2022-04-05 PROCEDURE — 11056 ROUTINE FOOT CARE: ICD-10-PCS | Mod: Q9,S$GLB,, | Performed by: PODIATRIST

## 2022-04-05 PROCEDURE — 11721 ROUTINE FOOT CARE: ICD-10-PCS | Mod: 59,Q9,S$GLB, | Performed by: PODIATRIST

## 2022-04-05 PROCEDURE — 99499 NO LOS: ICD-10-PCS | Mod: S$GLB,,, | Performed by: PODIATRIST

## 2022-04-05 PROCEDURE — 3078F PR MOST RECENT DIASTOLIC BLOOD PRESSURE < 80 MM HG: ICD-10-PCS | Mod: CPTII,S$GLB,, | Performed by: PODIATRIST

## 2022-04-05 PROCEDURE — 1101F PT FALLS ASSESS-DOCD LE1/YR: CPT | Mod: CPTII,S$GLB,, | Performed by: PODIATRIST

## 2022-04-05 PROCEDURE — 3066F NEPHROPATHY DOC TX: CPT | Mod: CPTII,S$GLB,, | Performed by: PODIATRIST

## 2022-04-05 PROCEDURE — 1126F PR PAIN SEVERITY QUANTIFIED, NO PAIN PRESENT: ICD-10-PCS | Mod: CPTII,S$GLB,, | Performed by: PODIATRIST

## 2022-04-05 PROCEDURE — 3074F PR MOST RECENT SYSTOLIC BLOOD PRESSURE < 130 MM HG: ICD-10-PCS | Mod: CPTII,S$GLB,, | Performed by: PODIATRIST

## 2022-04-05 PROCEDURE — 99999 PR PBB SHADOW E&M-EST. PATIENT-LVL V: ICD-10-PCS | Mod: PBBFAC,,, | Performed by: PODIATRIST

## 2022-04-05 PROCEDURE — 3044F HG A1C LEVEL LT 7.0%: CPT | Mod: CPTII,S$GLB,, | Performed by: PODIATRIST

## 2022-04-05 PROCEDURE — 99999 PR PBB SHADOW E&M-EST. PATIENT-LVL V: CPT | Mod: PBBFAC,,, | Performed by: PODIATRIST

## 2022-04-05 PROCEDURE — 3288F PR FALLS RISK ASSESSMENT DOCUMENTED: ICD-10-PCS | Mod: CPTII,S$GLB,, | Performed by: PODIATRIST

## 2022-04-05 PROCEDURE — 3078F DIAST BP <80 MM HG: CPT | Mod: CPTII,S$GLB,, | Performed by: PODIATRIST

## 2022-04-05 PROCEDURE — 3288F FALL RISK ASSESSMENT DOCD: CPT | Mod: CPTII,S$GLB,, | Performed by: PODIATRIST

## 2022-04-05 PROCEDURE — 1126F AMNT PAIN NOTED NONE PRSNT: CPT | Mod: CPTII,S$GLB,, | Performed by: PODIATRIST

## 2022-04-05 PROCEDURE — 1160F PR REVIEW ALL MEDS BY PRESCRIBER/CLIN PHARMACIST DOCUMENTED: ICD-10-PCS | Mod: CPTII,S$GLB,, | Performed by: PODIATRIST

## 2022-04-05 PROCEDURE — 11056 PARNG/CUTG B9 HYPRKR LES 2-4: CPT | Mod: Q9,S$GLB,, | Performed by: PODIATRIST

## 2022-04-05 PROCEDURE — 99499 UNLISTED E&M SERVICE: CPT | Mod: S$GLB,,, | Performed by: PODIATRIST

## 2022-04-05 PROCEDURE — 4010F ACE/ARB THERAPY RXD/TAKEN: CPT | Mod: CPTII,S$GLB,, | Performed by: PODIATRIST

## 2022-04-05 PROCEDURE — 4010F PR ACE/ARB THEARPY RXD/TAKEN: ICD-10-PCS | Mod: CPTII,S$GLB,, | Performed by: PODIATRIST

## 2022-04-05 PROCEDURE — 3066F PR DOCUMENTATION OF TREATMENT FOR NEPHROPATHY: ICD-10-PCS | Mod: CPTII,S$GLB,, | Performed by: PODIATRIST

## 2022-04-05 PROCEDURE — 11721 DEBRIDE NAIL 6 OR MORE: CPT | Mod: 59,Q9,S$GLB, | Performed by: PODIATRIST

## 2022-04-05 PROCEDURE — 3044F PR MOST RECENT HEMOGLOBIN A1C LEVEL <7.0%: ICD-10-PCS | Mod: CPTII,S$GLB,, | Performed by: PODIATRIST

## 2022-04-05 NOTE — PROGRESS NOTES
"  Chief Concern: Diabetic Foot Exam (11/26/22 Dr Roberts/A1c 6.3)      HPI:  Kaylee Romero is a 77 y.o. female presents for foot exam and care.       PCP:  Liz Roberts MD, her last visit with her PCP was on 11/26/2021          Patient Active Problem List   Diagnosis    Osteoarthritis of knee    Essential hypertension    Dyslipidemia, goal LDL below 70    Adrenal cortical steroids causing adverse effect in therapeutic use    Morbid obesity with body mass index (BMI) of 40.0 or higher    KAMRAN on CPAP    Physical deconditioning    Osteoporosis without current pathological fracture    History of TIA (transient ischemic attack)    Age-related osteoporosis without current pathological fracture    Vitamin D deficiency, unspecified    Type 2 diabetes mellitus with stage 3 chronic kidney disease, without long-term current use of insulin    History of MI (myocardial infarction)    Coronary 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 pectoris    S/P CABG (coronary artery bypass graft)    Acute blood loss anemia    Shoulder pain    Chest pain    Physical debility    GRANADO (dyspnea on exertion)    S/P drug eluting coronary stent placement    Decreased range of motion of left knee    Chronic pain of right knee    Antalgic gait    Abnormal stress test    Aortic atherosclerosis    Memory problem    Chronic heart failure with preserved ejection fraction                 Current Outpatient Medications on File Prior to Visit   Medication Sig Dispense Refill    alendronate (FOSAMAX) 70 MG tablet Take 1 tablet (70 mg total) by mouth every 7 days. 12 tablet 1    amLODIPine (NORVASC) 10 MG tablet TAKE 1 TABLET(10 MG) BY MOUTH EVERY DAY 90 tablet 2    atorvastatin (LIPITOR) 80 MG tablet Take 1 tablet (80 mg total) by mouth once daily. 90 tablet 1    BD BOO 2ND GEN PEN NEEDLE 32 gauge x 5/32" Ndle USE EVERY  each 8    blood sugar diagnostic Strp 1 strip by " Misc.(Non-Drug; Combo Route) route once daily. 100 strip 3    busPIRone (BUSPAR) 5 MG Tab Take 1 tablet (5 mg total) by mouth 2 (two) times daily. For anxiety. 180 tablet 1    carvediloL (COREG) 25 MG tablet Take 1 tablet (25 mg total) by mouth 2 (two) times daily with meals. 180 tablet 1    clopidogreL (PLAVIX) 75 mg tablet TAKE 1 TABLET(75 MG) BY MOUTH EVERY DAY 90 tablet 1    COD LIVER OIL ORAL Take 3 capsules by mouth once daily.      diclofenac sodium (VOLTAREN) 1 % Gel Apply 2 g topically 4 (four) times daily as needed. To painful area on the feet. 500 g 5    diphenoxylate-atropine 2.5-0.025 mg (LOMOTIL) 2.5-0.025 mg per tablet Take 1 tablet by mouth 4 (four) times daily as needed for Diarrhea. 30 tablet 0    donepeziL (ARICEPT) 5 MG tablet TAKE 1 TABLET(5 MG) BY MOUTH EVERY EVENING FOR MEMORY 90 tablet 1    dulaglutide (TRULICITY) 4.5 mg/0.5 mL pen injector Inject 4.5 mg into the skin every 7 days. 2 mL 3    FEROSUL 325 mg (65 mg iron) Tab tablet TAKE 1 TABLET BY MOUTH DAILY 90 tablet 0    furosemide (LASIX) 20 MG tablet TAKE 1 TABLET(20 MG) BY MOUTH EVERY DAY (Patient taking differently: 20 mg. TAKES ONLY ON Monday AND Thursday) 30 tablet 3    glucagon (GVOKE HYPOPEN 2-PACK) 1 mg/0.2 mL AtIn Inject 1 Package into the skin as needed. 2 Syringe 0    insulin glargine,hum.rec.anlog (BASAGLAR KWIKPEN U-100 INSULIN SUBQ) Inject 25 Units into the skin every evening.      irbesartan (AVAPRO) 300 MG tablet TAKE 1 TABLET(300 MG) BY MOUTH EVERY EVENING 90 tablet 0    isosorbide mononitrate (IMDUR) 30 MG 24 hr tablet Take 1 tablet (30 mg total) by mouth once daily. 90 tablet 3    lancets Misc 1 lancet by Misc.(Non-Drug; Combo Route) route once daily. 100 each 3    multivitamin (THERAGRAN) per tablet Take 1 tablet by mouth once daily.      nitroGLYCERIN (NITROSTAT) 0.4 MG SL tablet Place 1 tablet (0.4 mg total) under the tongue every 5 (five) minutes as needed for Chest pain. 25 tablet 6    vitamin D  "(VITAMIN D3) 1000 units Tab Take 1,000 Units by mouth twice a week. Monday AND THURSDAYS ONLY      aspirin 81 MG Chew Take 1 tablet (81 mg total) by mouth once daily.  0    glipiZIDE (GLUCOTROL) 5 MG tablet Take 2 tablets (10 mg total) by mouth 2 (two) times daily with meals. TAKE 2 TABLETS BY MOUTH DAILY WITH DINNER OR EVENING MEAL 120 tablet 8     Current Facility-Administered Medications on File Prior to Visit   Medication Dose Route Frequency Provider Last Rate Last Admin    hyaluronate sodium, stabilized (MONOVISC) Syrg   Intra-articular 1 time in Clinic/HOD Peterson Sharma MD             Review of patient's allergies indicates:   Allergen Reactions    Keflex [cephalexin] Other (See Comments)     Turns orange    Bactrim [sulfamethoxazole-trimethoprim]      Generic version  Sulfa makes her sick               Objective:        Vitals:    04/05/22 1414   BP: 125/61   Pulse: 90   Weight: 107.2 kg (236 lb 5.3 oz)   Height: 5' 2" (1.575 m)         Physical Exam  Constitutional:       Appearance: She is well-developed.   Cardiovascular:      Comments: DP and PT pulses 2/4 loreta.   Edema noted loreta.   Capillary refill time < 3 seconds to digits 1-5, loreta.   Absent hair growth      Musculoskeletal:         General: Deformity present. No tenderness. Normal range of motion.      Comments: HAV noted to loreta 1st MPJ. 5/5 strength to all LE muscle groups. No POP noted     Skin:     Capillary Refill: Capillary refill takes 2 to 3 seconds.      Findings: No erythema.      Comments: Toenails x 10 are elongated by 1-3mm, thickened by 1-3mm and mycotic with subungual debris.  Flaky skin on the soles.  No vesicles or redness.      Neurological:      Mental Status: She is alert and oriented to person, place, and time.      Comments: +paresthesias (Abnormal spontaneous sensations in feet)                   Assessment:       Problem List Items Addressed This Visit    None     Visit Diagnoses     Type II diabetes mellitus with " neurological manifestations    -  Primary    Corn or callus        Dermatophytosis of nail                Plan:         · I counseled the patient on the patient's conditions, their implications and medical management.   Shoe inspection.      Maintain proper foot hygiene.    Continue wearing proper shoe gear, daily foot inspections, never walking without protective shoe gear, never putting sharp instruments to feet.    Routine Foot Care    Date/Time: 4/5/2022 2:15 PM  Performed by: Donna Gillis DPM  Authorized by: Donna Gillis DPM     Consent Done?:  Yes (Verbal)  Hyperkeratotic Skin Lesions?: Yes    Number of trimmed lesions:  4  Location(s):  Left 1st Toe, Right 1st Toe, Left 1st Metatarsal Head and Right 1st Metatarsal Head    Nail Care Type:  Debride  Location(s): All  (Left 1st Toe, Left 3rd Toe, Left 2nd Toe, Left 4th Toe, Left 5th Toe, Right 1st Toe, Right 2nd Toe, Right 3rd Toe, Right 4th Toe and Right 5th Toe)  Patient tolerance:  Patient tolerated the procedure well with no immediate complications     With patient's permission, the toenails mentioned above were reduced and debrided using a nail nipper, removing offending nail and debris.   Utilizing a #15 scalpel, I trimmed the corns and calluses at the above mentioned location.      The patient will continue to monitor the areas daily, inspect the feet, wear protective shoe gear when ambulatory, and moisturizer to maintain skin integrity.                       Donna Gillis DPM

## 2022-04-08 ENCOUNTER — OFFICE VISIT (OUTPATIENT)
Dept: OPTOMETRY | Facility: CLINIC | Age: 77
End: 2022-04-08
Payer: MEDICARE

## 2022-04-08 DIAGNOSIS — I10 ESSENTIAL HYPERTENSION: ICD-10-CM

## 2022-04-08 DIAGNOSIS — H18.413 ARCUS SENILIS OF BOTH CORNEAS: ICD-10-CM

## 2022-04-08 DIAGNOSIS — H43.813 PVD (POSTERIOR VITREOUS DETACHMENT), BILATERAL: Primary | ICD-10-CM

## 2022-04-08 DIAGNOSIS — G47.33 OSA ON CPAP: ICD-10-CM

## 2022-04-08 DIAGNOSIS — H35.363 RETINAL DRUSEN OF BOTH EYES: ICD-10-CM

## 2022-04-08 DIAGNOSIS — E11.9 TYPE 2 DIABETES MELLITUS WITHOUT OPHTHALMIC MANIFESTATIONS: ICD-10-CM

## 2022-04-08 DIAGNOSIS — H25.13 NS (NUCLEAR SCLEROSIS), BILATERAL: ICD-10-CM

## 2022-04-08 DIAGNOSIS — H52.4 PRESBYOPIA: ICD-10-CM

## 2022-04-08 PROCEDURE — 92015 PR REFRACTION: ICD-10-PCS | Mod: S$GLB,,, | Performed by: OPTOMETRIST

## 2022-04-08 PROCEDURE — 99999 PR PBB SHADOW E&M-EST. PATIENT-LVL III: ICD-10-PCS | Mod: PBBFAC,,, | Performed by: OPTOMETRIST

## 2022-04-08 PROCEDURE — 92014 PR EYE EXAM, EST PATIENT,COMPREHESV: ICD-10-PCS | Mod: S$GLB,,, | Performed by: OPTOMETRIST

## 2022-04-08 PROCEDURE — 2023F PR DILATED RETINAL EXAM W/O EVID OF RETINOPATHY: ICD-10-PCS | Mod: CPTII,S$GLB,, | Performed by: OPTOMETRIST

## 2022-04-08 PROCEDURE — 1101F PT FALLS ASSESS-DOCD LE1/YR: CPT | Mod: CPTII,S$GLB,, | Performed by: OPTOMETRIST

## 2022-04-08 PROCEDURE — 3066F NEPHROPATHY DOC TX: CPT | Mod: CPTII,S$GLB,, | Performed by: OPTOMETRIST

## 2022-04-08 PROCEDURE — 92014 COMPRE OPH EXAM EST PT 1/>: CPT | Mod: S$GLB,,, | Performed by: OPTOMETRIST

## 2022-04-08 PROCEDURE — 1126F PR PAIN SEVERITY QUANTIFIED, NO PAIN PRESENT: ICD-10-PCS | Mod: CPTII,S$GLB,, | Performed by: OPTOMETRIST

## 2022-04-08 PROCEDURE — 1101F PR PT FALLS ASSESS DOC 0-1 FALLS W/OUT INJ PAST YR: ICD-10-PCS | Mod: CPTII,S$GLB,, | Performed by: OPTOMETRIST

## 2022-04-08 PROCEDURE — 99999 PR PBB SHADOW E&M-EST. PATIENT-LVL III: CPT | Mod: PBBFAC,,, | Performed by: OPTOMETRIST

## 2022-04-08 PROCEDURE — 3044F HG A1C LEVEL LT 7.0%: CPT | Mod: CPTII,S$GLB,, | Performed by: OPTOMETRIST

## 2022-04-08 PROCEDURE — 1159F PR MEDICATION LIST DOCUMENTED IN MEDICAL RECORD: ICD-10-PCS | Mod: CPTII,S$GLB,, | Performed by: OPTOMETRIST

## 2022-04-08 PROCEDURE — 3288F PR FALLS RISK ASSESSMENT DOCUMENTED: ICD-10-PCS | Mod: CPTII,S$GLB,, | Performed by: OPTOMETRIST

## 2022-04-08 PROCEDURE — 3288F FALL RISK ASSESSMENT DOCD: CPT | Mod: CPTII,S$GLB,, | Performed by: OPTOMETRIST

## 2022-04-08 PROCEDURE — 2023F DILAT RTA XM W/O RTNOPTHY: CPT | Mod: CPTII,S$GLB,, | Performed by: OPTOMETRIST

## 2022-04-08 PROCEDURE — 3044F PR MOST RECENT HEMOGLOBIN A1C LEVEL <7.0%: ICD-10-PCS | Mod: CPTII,S$GLB,, | Performed by: OPTOMETRIST

## 2022-04-08 PROCEDURE — 4010F PR ACE/ARB THEARPY RXD/TAKEN: ICD-10-PCS | Mod: CPTII,S$GLB,, | Performed by: OPTOMETRIST

## 2022-04-08 PROCEDURE — 1126F AMNT PAIN NOTED NONE PRSNT: CPT | Mod: CPTII,S$GLB,, | Performed by: OPTOMETRIST

## 2022-04-08 PROCEDURE — 4010F ACE/ARB THERAPY RXD/TAKEN: CPT | Mod: CPTII,S$GLB,, | Performed by: OPTOMETRIST

## 2022-04-08 PROCEDURE — 1159F MED LIST DOCD IN RCRD: CPT | Mod: CPTII,S$GLB,, | Performed by: OPTOMETRIST

## 2022-04-08 PROCEDURE — 92015 DETERMINE REFRACTIVE STATE: CPT | Mod: S$GLB,,, | Performed by: OPTOMETRIST

## 2022-04-08 PROCEDURE — 3066F PR DOCUMENTATION OF TREATMENT FOR NEPHROPATHY: ICD-10-PCS | Mod: CPTII,S$GLB,, | Performed by: OPTOMETRIST

## 2022-04-08 PROCEDURE — 3061F PR NEG MICROALBUMINURIA RESULT DOCUMENTED/REVIEW: ICD-10-PCS | Mod: CPTII,S$GLB,, | Performed by: OPTOMETRIST

## 2022-04-08 PROCEDURE — 3061F NEG MICROALBUMINURIA REV: CPT | Mod: CPTII,S$GLB,, | Performed by: OPTOMETRIST

## 2022-04-08 NOTE — PROGRESS NOTES
"HPI     Concerns About Ocular Health      Additional comments: Patient Kaylee Romero is a 77 year old female.                Comments     Pt here for annual diabetic eye exam. Pt states that her glasses need   "sharpening up," especially for reading. Pt states that she cannot see   distance without glasses. Patient denies diplopia, headaches,   flashes/floaters, and pain.    DLS: 10/01/2020 with Dr. López    Gtts: None    POHx:  1. NS (nuclear sclerosis), bilateral  2. KAMRAN on CPAP  3. Dry eye syndrome, bilateral  4. Presbyopia    FOHx: None      (+)DM LBS: 100 on 04/04/2022  Hemoglobin A1C       Date                     Value               Ref Range             Status                01/06/2022               6.3 (H)             4.0 - 5.6 %           Final              Comment:    ADA Screening Guidelines:  5.7-6.4%  Consistent with   prediabetes  >or=6.5%  Consistent with diabetes    High levels of fetal   hemoglobin interfere with the HbA1C  assay. Heterozygous hemoglobin   variants (HbS, HgC, etc)do  not significantly interfere with this assay.     However, presence of multiple variants may affect accuracy.         11/26/2021               7.4 (H)             4.0 - 5.6 %           Final              Comment:    ADA Screening Guidelines:  5.7-6.4%  Consistent with   prediabetes  >or=6.5%  Consistent with diabetes    High levels of fetal   hemoglobin interfere with the HbA1C  assay. Heterozygous hemoglobin   variants (HbS, HgC, etc)do  not significantly interfere with this assay.     However, presence of multiple variants may affect accuracy.         09/08/2021               7.2 (H)             4.0 - 5.6 %           Final              Comment:    ADA Screening Guidelines:  5.7-6.4%  Consistent with   prediabetes  >or=6.5%  Consistent with diabetes    High levels of fetal   hemoglobin interfere with the HbA1C  assay. Heterozygous hemoglobin   variants (HbS, HgC, etc)do  not significantly interfere with this " assay.     However, presence of multiple variants may affect accuracy.    ----------                 Last edited by Hali Gardner on 4/8/2022  1:52 PM. (History)            Assessment /Plan     For exam results, see Encounter Report.    PVD (posterior vitreous detachment), bilateral  Retinal drusen of both eyes  Arcus senilis of both corneas  Type 2 diabetes mellitus without ophthalmic manifestations  Essential hypertension   No retinopathy, monitor yearly    NS (nuclear sclerosis), bilateral              Not visually significant at this time, monitor yearly     KAMRAN on CPAP     Presbyopia              Rx specs                   RTC 1 year, sooner PRN

## 2022-04-19 DIAGNOSIS — N18.31 STAGE 3A CHRONIC KIDNEY DISEASE: Primary | ICD-10-CM

## 2022-05-02 ENCOUNTER — PATIENT MESSAGE (OUTPATIENT)
Dept: ENDOCRINOLOGY | Facility: CLINIC | Age: 77
End: 2022-05-02
Payer: MEDICARE

## 2022-05-02 DIAGNOSIS — N18.30 TYPE 2 DIABETES MELLITUS WITH STAGE 3 CHRONIC KIDNEY DISEASE AND HYPERTENSION: Primary | ICD-10-CM

## 2022-05-02 DIAGNOSIS — I12.9 TYPE 2 DIABETES MELLITUS WITH STAGE 3 CHRONIC KIDNEY DISEASE AND HYPERTENSION: Primary | ICD-10-CM

## 2022-05-02 DIAGNOSIS — E11.22 TYPE 2 DIABETES MELLITUS WITH STAGE 3 CHRONIC KIDNEY DISEASE AND HYPERTENSION: Primary | ICD-10-CM

## 2022-05-03 ENCOUNTER — PATIENT MESSAGE (OUTPATIENT)
Dept: ADMINISTRATIVE | Facility: OTHER | Age: 77
End: 2022-05-03
Payer: MEDICARE

## 2022-05-04 ENCOUNTER — PATIENT MESSAGE (OUTPATIENT)
Dept: OTHER | Facility: OTHER | Age: 77
End: 2022-05-04
Payer: MEDICARE

## 2022-05-04 ENCOUNTER — LAB VISIT (OUTPATIENT)
Dept: LAB | Facility: HOSPITAL | Age: 77
End: 2022-05-04
Attending: NURSE PRACTITIONER
Payer: MEDICARE

## 2022-05-04 DIAGNOSIS — I12.9 TYPE 2 DIABETES MELLITUS WITH STAGE 3 CHRONIC KIDNEY DISEASE AND HYPERTENSION: ICD-10-CM

## 2022-05-04 DIAGNOSIS — E83.52 HYPERCALCEMIA: ICD-10-CM

## 2022-05-04 DIAGNOSIS — E11.22 TYPE 2 DIABETES MELLITUS WITH STAGE 3 CHRONIC KIDNEY DISEASE AND HYPERTENSION: ICD-10-CM

## 2022-05-04 DIAGNOSIS — N18.30 TYPE 2 DIABETES MELLITUS WITH STAGE 3 CHRONIC KIDNEY DISEASE AND HYPERTENSION: ICD-10-CM

## 2022-05-04 DIAGNOSIS — N18.31 STAGE 3A CHRONIC KIDNEY DISEASE: ICD-10-CM

## 2022-05-04 LAB
ALBUMIN SERPL BCP-MCNC: 3.4 G/DL (ref 3.5–5.2)
ALP SERPL-CCNC: 75 U/L (ref 55–135)
ALT SERPL W/O P-5'-P-CCNC: 23 U/L (ref 10–44)
ANION GAP SERPL CALC-SCNC: 8 MMOL/L (ref 8–16)
AST SERPL-CCNC: 22 U/L (ref 10–40)
BILIRUB SERPL-MCNC: 0.4 MG/DL (ref 0.1–1)
BUN SERPL-MCNC: 18 MG/DL (ref 8–23)
CALCIUM SERPL-MCNC: 9.9 MG/DL (ref 8.7–10.5)
CHLORIDE SERPL-SCNC: 106 MMOL/L (ref 95–110)
CO2 SERPL-SCNC: 25 MMOL/L (ref 23–29)
CREAT SERPL-MCNC: 1.1 MG/DL (ref 0.5–1.4)
EST. GFR  (AFRICAN AMERICAN): 56.8 ML/MIN/1.73 M^2
EST. GFR  (NON AFRICAN AMERICAN): 49.2 ML/MIN/1.73 M^2
ESTIMATED AVG GLUCOSE: 126 MG/DL (ref 68–131)
GLUCOSE SERPL-MCNC: 98 MG/DL (ref 70–110)
HBA1C MFR BLD: 6 % (ref 4–5.6)
PHOSPHATE SERPL-MCNC: 3 MG/DL (ref 2.7–4.5)
PHOSPHATE SERPL-MCNC: 3 MG/DL (ref 2.7–4.5)
POTASSIUM SERPL-SCNC: 4.6 MMOL/L (ref 3.5–5.1)
PROT SERPL-MCNC: 8 G/DL (ref 6–8.4)
SODIUM SERPL-SCNC: 139 MMOL/L (ref 136–145)
TSH SERPL DL<=0.005 MIU/L-ACNC: 3.7 UIU/ML (ref 0.4–4)

## 2022-05-04 PROCEDURE — 84100 ASSAY OF PHOSPHORUS: CPT | Performed by: NURSE PRACTITIONER

## 2022-05-04 PROCEDURE — 80053 COMPREHEN METABOLIC PANEL: CPT | Performed by: NURSE PRACTITIONER

## 2022-05-04 PROCEDURE — 83036 HEMOGLOBIN GLYCOSYLATED A1C: CPT | Performed by: NURSE PRACTITIONER

## 2022-05-04 PROCEDURE — 36415 COLL VENOUS BLD VENIPUNCTURE: CPT | Mod: PN | Performed by: NURSE PRACTITIONER

## 2022-05-04 PROCEDURE — 85025 COMPLETE CBC W/AUTO DIFF WBC: CPT | Performed by: INTERNAL MEDICINE

## 2022-05-04 PROCEDURE — 84443 ASSAY THYROID STIM HORMONE: CPT | Performed by: NURSE PRACTITIONER

## 2022-05-04 NOTE — PROGRESS NOTES
Patient ID: Kaylee Romero is a 77 y.o. female    Chief Complaint:   No chief complaint on file.    HPI: Kaylee Romero with type 2 diabetes complicated by CAD s/p CABG, TIAs, hypertension, dyslipidemia, CKD Stage 3, obesity, osteoporosis, and KAMRAN presents for follow up of Type 2 diabetes and osteoporosis. Previous patient of amBX. Last visit in Jan 2022.      Was seen in the hospital by KAVON Pritchard NP in 9/2019 because of a Surgical Procedure and Date: CABG 8/12/19.  He started her on levemir daily.     At her Sept 2020 she had a steroid injection and gave extra long acting insulin. Interim visit for lower blood sugars in Sept 2020 (BGs in 40-60's) and we stopped her basaglar as she gave up sugar.     At her previous visits, she would stop and restart insulin based on what she was eating -sweets daily.     Since her last visit we checked a CGMS in Jan 2022  Average glucose: 158   Above 180 mg/dL: 30.9% (Above 250 mg/dL: 4.0%)   Within  mg/dL: 67.5%   Below 70 mg/dL: 1.7%  (Below 54 mg/dL: <1%)    Continue   Trulicity 4.5 mg weekly   Glipizide 10 mg twice daily with meals   Decrease   basaglar to 22 units daily      Since her last visit, she reports she has been having some issues with forgetting things and feeling drained, fatigued, and tired. She stopped Buspar on hwe own and states she has started feeling better. She is getting more things done in her home.     She reports she gave up sweets for LENT and has senait doing better.     With regards to the diabetes:     Diagnosed with Type 2 diabetes: 2013  Family history of Type 2 diabetes: father  Known complications:  DKA-  RN-; not reviewed; 4/8/2022  PN- seeing podiatry 4/2022  Nephropathy: now seeing nephrology   Gastroparesis: denies   CAD: 3 MI and one stroke     Current regimen:  Trulicity 4.5 mg weekly  Glipizide 10 mg breakfast and dinner  basaglar 22 units daily whenever her  injects    NOVOLOG PRN steroid injection-none recent    Other  medications tried:  Jardiance-insurance issues  Brad Whitlock - did not want to start due to SE profile     Glucose Monitor:  She is checking blood sugar as below  Log reviewed: yes, digital medicine program        Hypoglycemia: denies    Denies symptoms of hypoglycemia-hypoglycemia unawareness   Knows how to correct with 15 grams of carbs- juice, coke, or a peppermint.      Diet/Exercise: She feels that she is trying to follow diabetic diet. She is   eats 1-2 meals a day. She is eating off a small plate.  Gave up sweets since her last visit for EDUARDO.   Breakfast: cottage cheese with fruit or activa or fried egg with pepper and ham or turkey and cheese with bread   Lunch sometimes skipped  Dinner sometimes skipped    She eats nuts in the afternoon   Main meal is vegetable and meat sometimes rice   Snacks: nuts and something sweet nightly.     Drinks: mostly water; OJ with breakfast; sometimes has lemon lime soft drink at times  Exercise - tries to stay active. No formal exercise. Has right knee pain. Using cane today. She was going to PT but no longer going. She is doing cycling for 20-30 minutes on floor bike. She is also doing stretching.  She would like to go back to therapy but stopped because she did not think she was getting benefit out of it as it would fatigue her too much and she would be unable to much the rest of the week.     Education - last visit: none; politely declines  Has GVOKE    Denies history of pancreatitis & personal/family history of medullary thyroid cancer.    Lab Results   Component Value Date    HGBA1C 6.0 (H) 05/04/2022    HGBA1C 6.3 (H) 01/06/2022    HGBA1C 7.4 (H) 11/26/2021     Screening or Prevention Patient's value Goal Complete/Controlled?   HgA1C Testing and Control   Lab Results   Component Value Date    HGBA1C 6.0 (H) 05/04/2022      Annually/Less than 8% Yes   Lipid profile : 09/08/2021 Annually Yes   LDL control Lab Results   Component Value Date    LDLCALC 42.8 (L)  09/08/2021    Annually/Less than 100 mg/dl  Yes   Nephropathy screening Lab Results   Component Value Date    LABMICR <5.0 02/21/2022     Lab Results   Component Value Date    PROTEINUA Negative 05/05/2022    Annually No   Blood pressure BP Readings from Last 1 Encounters:   05/10/22 108/74    Less than 140/90 Yes   Dilated retinal exam : 04/08/2022 Annually Yes   Foot exam   : 08/09/2021 Annually Yes     With regards to osteoporosis:   Current meds: Fosamax  Started: 6/2019     Calcium intake- no calcium supplementation; has in diet   Vit D intake-  1000 twice weekly   Weight bearing exercise-   Walking as tolerated with knee pain    Recent falls- August 2018; September 2018 - no fractures and no falls from a standing position.       They have made fall precautions in house      No recent fractures   No significant height loss (>2 inches)     tob use -  None  etoh use- None     Family history: none     No current diarrhea or h/o malabsorption     Menopause at age 56     Denies chronic exposure to anticoagulants, proton pump inhibitors or antiseizure medications.   +Steroid injections knees     She is using cane    Denies GERD, indigestion, or gastric bypass surgery.      Denies history of thyroid disease, anemia, kidney stones or kidney disease.      Denies active malignancy, history of malignancy the involved the bone, prior radiation treatment, or unexplained elevations of alk phos on labs      BMD 5/24/21  FINDINGS:  Lumbar spine (L1-L4):              BMD is 1.115 g/cm2, T-score is 0.6, and Z-score is 2.3.   Total hip:                               BMD is 0.838 g/cm2, T-score is -0.9, and Z-score is 0.0.   Femoral neck:                         BMD is 0.624 g/cm2, T-score is -2.0, and Z-score is -0.8.   FRAX:   7.7% risk of a major osteoporotic fracture in the next 10 years.  1.6% risk of hip fracture in the next 10 years.  Impression:  1. Osteoporosis on treatment with Fosamax  2. Compared with previous DXA, BMD  at the lumbar spine has increased by 4.3%, and the BMD at the total hip has increased by 3.6%.  RECOMMENDATIONS:  RECOMMENDATIONS of [facility]:     1. Recommend daily calcium intake 8000-3402 mg, dietary sources preferred; Vitamin D 6515-5442 IU daily.  2. Adequate exercise and fall precautions.  3. Recommend continued treatment with Fosamax  4. Repeat BMD in 2 years    With regards to the vitamin d deficiency and hypercalcemia,     Transient increase in calcium level in Sept 2021 that normalized when we decrease Vitamin d and stopped chlorthalidone     currently taking otc 2000 twice weekly    Lab Results   Component Value Date    PTH 53.1 01/06/2022    CALCIUM 9.9 05/04/2022    CALCIUM 9.9 05/04/2022    CALCIUM 9.9 05/04/2022    PHOS 3.0 05/04/2022    PHOS 3.0 05/04/2022     Lab Results   Component Value Date    ALBUMIN 3.4 (L) 05/04/2022    ALBUMIN 3.4 (L) 05/04/2022    ALBUMIN 3.4 (L) 05/04/2022     Vit D, 25-Hydroxy   Date Value Ref Range Status   11/26/2021 66 30 - 96 ng/mL Final     Comment:     Vitamin D deficiency.........<10 ng/mL                              Vitamin D insufficiency......10-29 ng/mL       Vitamin D sufficiency........> or equal to 30 ng/mL  Vitamin D toxicity............>100 ng/mL       Denies constipation, depression, polyuria due to lasix, muscle aches or pains.    No recent fractures     Does report some diarrhea, especially if eating out. States this has been ongoing for the last couple of years. Has not seen GI in the past. Does take Lomtil with some relief.     Review of Systems   Constitutional: Negative for fatigue and unexpected weight change.   Eyes: Negative for visual disturbance.   Endocrine: Negative for polydipsia, polyphagia and polyuria.   Musculoskeletal: Positive for arthralgias.   Hematological: Does not bruise/bleed easily.     OBJECTIVE    Physical Exam  Vitals reviewed.   Constitutional:       Appearance: Normal appearance.  "  Pulmonary:      Effort: Pulmonary effort is normal.   Musculoskeletal:      Comments: In wheelchair today   Neurological:      Mental Status: She is alert.     injection sites are without edema or erythema. No lipo hypertropthy or atrophy  DM foot exam deferred; done in 8/2021    Wt Readings from Last 3 Encounters:   05/10/22 1354 107 kg (236 lb)   04/05/22 1414 107.2 kg (236 lb 5.3 oz)   03/31/22 1355 107.2 kg (236 lb 5.3 oz)     Vitals:    05/10/22 1454   BP: 108/74   Pulse: 81         Current Outpatient Medications:     alendronate (FOSAMAX) 70 MG tablet, Take 1 tablet (70 mg total) by mouth every 7 days., Disp: 12 tablet, Rfl: 1    amLODIPine (NORVASC) 10 MG tablet, TAKE 1 TABLET(10 MG) BY MOUTH EVERY DAY, Disp: 90 tablet, Rfl: 2    atorvastatin (LIPITOR) 80 MG tablet, Take 1 tablet (80 mg total) by mouth once daily., Disp: 90 tablet, Rfl: 1    BD BOO 2ND GEN PEN NEEDLE 32 gauge x 5/32" Ndle, USE EVERY DAY, Disp: 100 each, Rfl: 8    blood sugar diagnostic Strp, 1 strip by Misc.(Non-Drug; Combo Route) route once daily., Disp: 100 strip, Rfl: 3    carvediloL (COREG) 25 MG tablet, Take 1 tablet (25 mg total) by mouth 2 (two) times daily with meals., Disp: 180 tablet, Rfl: 1    clopidogreL (PLAVIX) 75 mg tablet, TAKE 1 TABLET(75 MG) BY MOUTH EVERY DAY, Disp: 90 tablet, Rfl: 0    COD LIVER OIL ORAL, Take 3 capsules by mouth once daily., Disp: , Rfl:     diclofenac sodium (VOLTAREN) 1 % Gel, Apply 2 g topically 4 (four) times daily as needed. To painful area on the feet., Disp: 500 g, Rfl: 5    diphenoxylate-atropine 2.5-0.025 mg (LOMOTIL) 2.5-0.025 mg per tablet, Take 1 tablet by mouth 4 (four) times daily as needed for Diarrhea., Disp: 30 tablet, Rfl: 0    donepeziL (ARICEPT) 5 MG tablet, TAKE 1 TABLET(5 MG) BY MOUTH EVERY EVENING FOR MEMORY, Disp: 90 tablet, Rfl: 1    dulaglutide (TRULICITY) 4.5 mg/0.5 mL pen injector, Inject 4.5 mg into the skin every 7 days., Disp: 2 mL, Rfl: 3    FEROSUL 325 mg " (65 mg iron) Tab tablet, TAKE 1 TABLET BY MOUTH DAILY, Disp: 90 tablet, Rfl: 1    furosemide (LASIX) 20 MG tablet, TAKE 1 TABLET(20 MG) BY MOUTH EVERY DAY (Patient taking differently: 20 mg. TAKES ONLY ON Monday AND Thursday), Disp: 30 tablet, Rfl: 3    glucagon (GVOKE HYPOPEN 2-PACK) 1 mg/0.2 mL AtIn, Inject 1 Package into the skin as needed., Disp: 2 Syringe, Rfl: 0    insulin glargine,hum.rec.anlog (BASAGLAR KWIKPEN U-100 INSULIN SUBQ), Inject 25 Units into the skin every evening., Disp: , Rfl:     irbesartan (AVAPRO) 300 MG tablet, TAKE 1 TABLET(300 MG) BY MOUTH EVERY EVENING, Disp: 90 tablet, Rfl: 0    isosorbide mononitrate (IMDUR) 30 MG 24 hr tablet, Take 1 tablet (30 mg total) by mouth once daily., Disp: 90 tablet, Rfl: 3    lancets Misc, 1 lancet by Misc.(Non-Drug; Combo Route) route once daily., Disp: 100 each, Rfl: 3    multivitamin (THERAGRAN) per tablet, Take 1 tablet by mouth once daily., Disp: , Rfl:     nitroGLYCERIN (NITROSTAT) 0.4 MG SL tablet, Place 1 tablet (0.4 mg total) under the tongue every 5 (five) minutes as needed for Chest pain., Disp: 25 tablet, Rfl: 6    vitamin D (VITAMIN D3) 1000 units Tab, Take 1,000 Units by mouth twice a week. Monday AND THURSDAYS ONLY, Disp: , Rfl:     aspirin 81 MG Chew, Take 1 tablet (81 mg total) by mouth once daily., Disp: , Rfl: 0    busPIRone (BUSPAR) 5 MG Tab, TAKE 1 TABLET(5 MG) BY MOUTH TWICE DAILY FOR ANXIETY (Patient not taking: Reported on 5/10/2022), Disp: 180 tablet, Rfl: 1    glipiZIDE (GLUCOTROL) 5 MG tablet, Take 2 tablets (10 mg total) by mouth 2 (two) times daily with meals. TAKE 2 TABLETS BY MOUTH DAILY WITH DINNER OR EVENING MEAL, Disp: 120 tablet, Rfl: 8    Current Facility-Administered Medications:     hyaluronate sodium, stabilized (MONOVISC) Syrg, , Intra-articular, 1 time in Clinic/HOD, Peterson Sharma MD    Labs reviewed    Assessment and plan:   1. Type 2 diabetes mellitus with stage 3 chronic kidney disease and  hypertension  Comprehensive Metabolic Panel    Hemoglobin A1C    GLUCOSE MONITORING CONTINUOUS MIN 72 HOURS   2. Osteoporosis without current pathological fracture, unspecified osteoporosis type     3. Vitamin D deficiency, unspecified     4. Essential hypertension     5. Dyslipidemia, goal LDL below 70     6. Irritable bowel syndrome with diarrhea  Ambulatory referral/consult to Gastroenterology   7. Morbid obesity with BMI of 40.0-44.9, adult     8. Type 2 diabetes mellitus with stage 3a chronic kidney disease, without long-term current use of insulin     9. Morbid obesity with body mass index (BMI) of 40.0 or higher         Type 2 diabetes mellitus with stage 3 chronic kidney disease, without long-term current use of insulin  -- Labs prior  -- A1c 7-7.5% without hypoglycemia.   -- Medication Changes:     Discussed her A1c is at goal. Fasting blood sugar on her lab was at goal at 98 but lower end of normal. Discussed I am fearful of hypoglycemia. We will perform a professional continuous glucose monitor in order to assess blood sugar trends and patterns, highs and lows, and determine treatment plan.      Continue   Trulicity 4.5 mg weekly  Glipizide 10 mg twice daily  basaglar 22 units daily      Consult GI for diarrhea as she reports this has been ongoing for years    Message me for any blood sugar less than 70 and we will adjustments to your regimen. Message me for persistent blood sugars above 180.  -- We will continue novolog for steroid injections. Discussed she will take the novolog before meals with sliding scale below.  With using correction scale:  MDD of:   150-200: +2 units  201-250: +4 units  251-300: +6 units  301-350: +8 units  >350: +10 units    -- Has GVOKE pen.   -- She is fearful of DKA Discussed signs and symptoms of DKA and instructed to buy ketone strips  -- Reviewed goals of therapy are to get the best control we can without hypoglycemia  -- Insulin injection sites and proper rotation  instructed.    -- Advised frequent self blood glucose monitoring.  Patient encouraged to document glucose results and bring them to every clinic visit.  -- Hypoglycemia precautions discussed. Instructed on precautions before driving.    -- Call for Bg repeatedly < 90 or > 180.   -- Close adherence to lifestyle changes recommended.   -- Periodic follow ups for eye evaluations, foot care and dental care suggested. UTD    Osteoporosis without current pathological fracture  -- Continue fosamax weekly  -- Reviewed basics of quantity versus quality  -- Reassuring she is not fracturing   -- RDA of calcium (1661-8449 mg /day in divided doses) and vitamin D 2000iu a day  discussed  -- Calcium from food sources preferred  -- Fall precautions emphasized  -- +weight bearing exercise  -- Repeat DEXA scan in May 2023     Vitamin D deficiency, unspecified  Above goal at last check and decreased   Continue vit d 2000 twice weekly    Essential hypertension  -- on ARB  -- Controlled  -- Blood pressure goals discussed with patient    Dyslipidemia, goal LDL below 70  -- Controlled   -- On statin per ADA recommendations    Morbid obesity with body mass index (BMI) of 40.0 or higher  Encouraged continued exercise and diet.  Given information on diet      Follow up in about 4 months (around 9/10/2022).  Labs prior to RTC   CGMS

## 2022-05-05 ENCOUNTER — LAB VISIT (OUTPATIENT)
Dept: LAB | Facility: HOSPITAL | Age: 77
End: 2022-05-05
Attending: INTERNAL MEDICINE
Payer: MEDICARE

## 2022-05-05 DIAGNOSIS — N18.31 STAGE 3A CHRONIC KIDNEY DISEASE: ICD-10-CM

## 2022-05-05 LAB
BASOPHILS # BLD AUTO: 0.03 K/UL (ref 0–0.2)
BASOPHILS NFR BLD: 0.5 % (ref 0–1.9)
BILIRUB UR QL STRIP: NEGATIVE
CLARITY UR REFRACT.AUTO: CLEAR
COLOR UR AUTO: NORMAL
DIFFERENTIAL METHOD: ABNORMAL
EOSINOPHIL # BLD AUTO: 0.2 K/UL (ref 0–0.5)
EOSINOPHIL NFR BLD: 3.5 % (ref 0–8)
ERYTHROCYTE [DISTWIDTH] IN BLOOD BY AUTOMATED COUNT: 15.6 % (ref 11.5–14.5)
GLUCOSE UR QL STRIP: NEGATIVE
HCT VFR BLD AUTO: 35.1 % (ref 37–48.5)
HGB BLD-MCNC: 10.9 G/DL (ref 12–16)
HGB UR QL STRIP: NEGATIVE
IMM GRANULOCYTES # BLD AUTO: 0.02 K/UL (ref 0–0.04)
IMM GRANULOCYTES NFR BLD AUTO: 0.3 % (ref 0–0.5)
KETONES UR QL STRIP: NEGATIVE
LEUKOCYTE ESTERASE UR QL STRIP: NEGATIVE
LYMPHOCYTES # BLD AUTO: 1.3 K/UL (ref 1–4.8)
LYMPHOCYTES NFR BLD: 22.5 % (ref 18–48)
MCH RBC QN AUTO: 27 PG (ref 27–31)
MCHC RBC AUTO-ENTMCNC: 31.1 G/DL (ref 32–36)
MCV RBC AUTO: 87 FL (ref 82–98)
MONOCYTES # BLD AUTO: 0.5 K/UL (ref 0.3–1)
MONOCYTES NFR BLD: 9.2 % (ref 4–15)
NEUTROPHILS # BLD AUTO: 3.7 K/UL (ref 1.8–7.7)
NEUTROPHILS NFR BLD: 64 % (ref 38–73)
NITRITE UR QL STRIP: NEGATIVE
NRBC BLD-RTO: 0 /100 WBC
PH UR STRIP: 5 [PH] (ref 5–8)
PLATELET # BLD AUTO: 218 K/UL (ref 150–450)
PMV BLD AUTO: 11 FL (ref 9.2–12.9)
PROT UR QL STRIP: NEGATIVE
RBC # BLD AUTO: 4.04 M/UL (ref 4–5.4)
SP GR UR STRIP: 1 (ref 1–1.03)
URN SPEC COLLECT METH UR: NORMAL
WBC # BLD AUTO: 5.79 K/UL (ref 3.9–12.7)

## 2022-05-05 PROCEDURE — 81003 URINALYSIS AUTO W/O SCOPE: CPT | Performed by: INTERNAL MEDICINE

## 2022-05-10 ENCOUNTER — HOSPITAL ENCOUNTER (OUTPATIENT)
Dept: CARDIOLOGY | Facility: HOSPITAL | Age: 77
Discharge: HOME | End: 2022-05-10
Attending: INTERNAL MEDICINE
Payer: MEDICARE

## 2022-05-10 ENCOUNTER — OFFICE VISIT (OUTPATIENT)
Dept: ENDOCRINOLOGY | Facility: CLINIC | Age: 77
End: 2022-05-10
Payer: MEDICARE

## 2022-05-10 VITALS
DIASTOLIC BLOOD PRESSURE: 74 MMHG | OXYGEN SATURATION: 99 % | HEART RATE: 81 BPM | BODY MASS INDEX: 43.16 KG/M2 | HEIGHT: 62 IN | SYSTOLIC BLOOD PRESSURE: 108 MMHG

## 2022-05-10 VITALS
HEART RATE: 70 BPM | BODY MASS INDEX: 43.43 KG/M2 | DIASTOLIC BLOOD PRESSURE: 70 MMHG | HEIGHT: 62 IN | SYSTOLIC BLOOD PRESSURE: 120 MMHG | WEIGHT: 236 LBS

## 2022-05-10 DIAGNOSIS — R01.1 MURMUR: ICD-10-CM

## 2022-05-10 DIAGNOSIS — N18.30 TYPE 2 DIABETES MELLITUS WITH STAGE 3 CHRONIC KIDNEY DISEASE AND HYPERTENSION: Primary | ICD-10-CM

## 2022-05-10 DIAGNOSIS — E78.5 DYSLIPIDEMIA, GOAL LDL BELOW 70: ICD-10-CM

## 2022-05-10 DIAGNOSIS — E55.9 VITAMIN D DEFICIENCY, UNSPECIFIED: ICD-10-CM

## 2022-05-10 DIAGNOSIS — K58.0 IRRITABLE BOWEL SYNDROME WITH DIARRHEA: ICD-10-CM

## 2022-05-10 DIAGNOSIS — I12.9 TYPE 2 DIABETES MELLITUS WITH STAGE 3 CHRONIC KIDNEY DISEASE AND HYPERTENSION: Primary | ICD-10-CM

## 2022-05-10 DIAGNOSIS — E11.22 TYPE 2 DIABETES MELLITUS WITH STAGE 3 CHRONIC KIDNEY DISEASE AND HYPERTENSION: Primary | ICD-10-CM

## 2022-05-10 DIAGNOSIS — E66.01 MORBID OBESITY WITH BODY MASS INDEX (BMI) OF 40.0 OR HIGHER: ICD-10-CM

## 2022-05-10 DIAGNOSIS — N18.31 TYPE 2 DIABETES MELLITUS WITH STAGE 3A CHRONIC KIDNEY DISEASE, WITHOUT LONG-TERM CURRENT USE OF INSULIN: Chronic | ICD-10-CM

## 2022-05-10 DIAGNOSIS — Z95.1 S/P CABG (CORONARY ARTERY BYPASS GRAFT): ICD-10-CM

## 2022-05-10 DIAGNOSIS — E11.22 TYPE 2 DIABETES MELLITUS WITH STAGE 3A CHRONIC KIDNEY DISEASE, WITHOUT LONG-TERM CURRENT USE OF INSULIN: Chronic | ICD-10-CM

## 2022-05-10 DIAGNOSIS — E66.01 MORBID OBESITY WITH BMI OF 40.0-44.9, ADULT: ICD-10-CM

## 2022-05-10 DIAGNOSIS — I10 ESSENTIAL HYPERTENSION: Chronic | ICD-10-CM

## 2022-05-10 DIAGNOSIS — M81.0 OSTEOPOROSIS WITHOUT CURRENT PATHOLOGICAL FRACTURE, UNSPECIFIED OSTEOPOROSIS TYPE: Chronic | ICD-10-CM

## 2022-05-10 LAB
ASCENDING AORTA: 3.03 CM
AV INDEX (PROSTH): 0.5
AV MEAN GRADIENT: 8 MMHG
AV PEAK GRADIENT: 16 MMHG
AV VALVE AREA: 1.56 CM2
AV VELOCITY RATIO: 0.49
BSA FOR ECHO PROCEDURE: 2.16 M2
CV ECHO LV RWT: 0.39 CM
DOP CALC AO PEAK VEL: 2 M/S
DOP CALC AO VTI: 41.25 CM
DOP CALC LVOT AREA: 3.1 CM2
DOP CALC LVOT DIAMETER: 2 CM
DOP CALC LVOT PEAK VEL: 0.97 M/S
DOP CALC LVOT STROKE VOLUME: 64.18 CM3
DOP CALCLVOT PEAK VEL VTI: 20.44 CM
E WAVE DECELERATION TIME: 185.82 MSEC
E/A RATIO: 1.36
E/E' RATIO: 19.33 M/S
ECHO LV POSTERIOR WALL: 0.76 CM (ref 0.6–1.1)
EJECTION FRACTION: 65 %
FRACTIONAL SHORTENING: 38 % (ref 28–44)
INTERVENTRICULAR SEPTUM: 0.87 CM (ref 0.6–1.1)
IVRT: 57.09 MSEC
LA MAJOR: 5.41 CM
LA MINOR: 4.9 CM
LA WIDTH: 4.53 CM
LEFT ATRIUM SIZE: 3.35 CM
LEFT ATRIUM VOLUME INDEX MOD: 43 ML/M2
LEFT ATRIUM VOLUME INDEX: 32.4 ML/M2
LEFT ATRIUM VOLUME MOD: 88.22 CM3
LEFT ATRIUM VOLUME: 66.33 CM3
LEFT INTERNAL DIMENSION IN SYSTOLE: 2.45 CM (ref 2.1–4)
LEFT VENTRICLE DIASTOLIC VOLUME INDEX: 32.55 ML/M2
LEFT VENTRICLE DIASTOLIC VOLUME: 66.73 ML
LEFT VENTRICLE MASS INDEX: 45 G/M2
LEFT VENTRICLE SYSTOLIC VOLUME INDEX: 10.3 ML/M2
LEFT VENTRICLE SYSTOLIC VOLUME: 21.12 ML
LEFT VENTRICULAR INTERNAL DIMENSION IN DIASTOLE: 3.92 CM (ref 3.5–6)
LEFT VENTRICULAR MASS: 92.72 G
LV LATERAL E/E' RATIO: 19.33 M/S
LV SEPTAL E/E' RATIO: 19.33 M/S
MV A" WAVE DURATION": 17.98 MSEC
MV PEAK A VEL: 0.85 M/S
MV PEAK E VEL: 1.16 M/S
MV STENOSIS PRESSURE HALF TIME: 53.89 MS
MV VALVE AREA P 1/2 METHOD: 4.08 CM2
PISA TR MAX VEL: 2.4 M/S
PULM VEIN S/D RATIO: 0.73
PV PEAK D VEL: 0.48 M/S
PV PEAK S VEL: 0.35 M/S
RA MAJOR: 4.73 CM
RA PRESSURE: 8 MMHG
RA WIDTH: 2.89 CM
RIGHT VENTRICULAR END-DIASTOLIC DIMENSION: 2.9 CM
RV TISSUE DOPPLER FREE WALL SYSTOLIC VELOCITY 1 (APICAL 4 CHAMBER VIEW): 7.35 CM/S
SINUS: 2.93 CM
STJ: 2.62 CM
TDI LATERAL: 0.06 M/S
TDI SEPTAL: 0.06 M/S
TDI: 0.06 M/S
TR MAX PG: 23 MMHG
TRICUSPID ANNULAR PLANE SYSTOLIC EXCURSION: 1.61 CM
TV REST PULMONARY ARTERY PRESSURE: 31 MMHG

## 2022-05-10 PROCEDURE — 1159F PR MEDICATION LIST DOCUMENTED IN MEDICAL RECORD: ICD-10-PCS | Mod: CPTII,S$GLB,, | Performed by: NURSE PRACTITIONER

## 2022-05-10 PROCEDURE — 4010F ACE/ARB THERAPY RXD/TAKEN: CPT | Mod: CPTII,S$GLB,, | Performed by: NURSE PRACTITIONER

## 2022-05-10 PROCEDURE — 3078F PR MOST RECENT DIASTOLIC BLOOD PRESSURE < 80 MM HG: ICD-10-PCS | Mod: CPTII,S$GLB,, | Performed by: NURSE PRACTITIONER

## 2022-05-10 PROCEDURE — 93306 TTE W/DOPPLER COMPLETE: CPT

## 2022-05-10 PROCEDURE — 3066F NEPHROPATHY DOC TX: CPT | Mod: CPTII,S$GLB,, | Performed by: NURSE PRACTITIONER

## 2022-05-10 PROCEDURE — 99214 PR OFFICE/OUTPT VISIT, EST, LEVL IV, 30-39 MIN: ICD-10-PCS | Mod: S$GLB,,, | Performed by: NURSE PRACTITIONER

## 2022-05-10 PROCEDURE — 4010F PR ACE/ARB THEARPY RXD/TAKEN: ICD-10-PCS | Mod: CPTII,S$GLB,, | Performed by: NURSE PRACTITIONER

## 2022-05-10 PROCEDURE — 3288F FALL RISK ASSESSMENT DOCD: CPT | Mod: CPTII,S$GLB,, | Performed by: NURSE PRACTITIONER

## 2022-05-10 PROCEDURE — 3074F PR MOST RECENT SYSTOLIC BLOOD PRESSURE < 130 MM HG: ICD-10-PCS | Mod: CPTII,S$GLB,, | Performed by: NURSE PRACTITIONER

## 2022-05-10 PROCEDURE — 3044F PR MOST RECENT HEMOGLOBIN A1C LEVEL <7.0%: ICD-10-PCS | Mod: CPTII,S$GLB,, | Performed by: NURSE PRACTITIONER

## 2022-05-10 PROCEDURE — 93306 ECHO (CUPID ONLY): ICD-10-PCS | Mod: 26,,, | Performed by: INTERNAL MEDICINE

## 2022-05-10 PROCEDURE — 99999 PR PBB SHADOW E&M-EST. PATIENT-LVL V: CPT | Mod: PBBFAC,,, | Performed by: NURSE PRACTITIONER

## 2022-05-10 PROCEDURE — 3074F SYST BP LT 130 MM HG: CPT | Mod: CPTII,S$GLB,, | Performed by: NURSE PRACTITIONER

## 2022-05-10 PROCEDURE — 1160F PR REVIEW ALL MEDS BY PRESCRIBER/CLIN PHARMACIST DOCUMENTED: ICD-10-PCS | Mod: CPTII,S$GLB,, | Performed by: NURSE PRACTITIONER

## 2022-05-10 PROCEDURE — 1160F RVW MEDS BY RX/DR IN RCRD: CPT | Mod: CPTII,S$GLB,, | Performed by: NURSE PRACTITIONER

## 2022-05-10 PROCEDURE — 3078F DIAST BP <80 MM HG: CPT | Mod: CPTII,S$GLB,, | Performed by: NURSE PRACTITIONER

## 2022-05-10 PROCEDURE — 3044F HG A1C LEVEL LT 7.0%: CPT | Mod: CPTII,S$GLB,, | Performed by: NURSE PRACTITIONER

## 2022-05-10 PROCEDURE — 93306 TTE W/DOPPLER COMPLETE: CPT | Mod: 26,,, | Performed by: INTERNAL MEDICINE

## 2022-05-10 PROCEDURE — 3061F NEG MICROALBUMINURIA REV: CPT | Mod: CPTII,S$GLB,, | Performed by: NURSE PRACTITIONER

## 2022-05-10 PROCEDURE — 1125F AMNT PAIN NOTED PAIN PRSNT: CPT | Mod: CPTII,S$GLB,, | Performed by: NURSE PRACTITIONER

## 2022-05-10 PROCEDURE — 1101F PT FALLS ASSESS-DOCD LE1/YR: CPT | Mod: CPTII,S$GLB,, | Performed by: NURSE PRACTITIONER

## 2022-05-10 PROCEDURE — 3288F PR FALLS RISK ASSESSMENT DOCUMENTED: ICD-10-PCS | Mod: CPTII,S$GLB,, | Performed by: NURSE PRACTITIONER

## 2022-05-10 PROCEDURE — 3066F PR DOCUMENTATION OF TREATMENT FOR NEPHROPATHY: ICD-10-PCS | Mod: CPTII,S$GLB,, | Performed by: NURSE PRACTITIONER

## 2022-05-10 PROCEDURE — 99214 OFFICE O/P EST MOD 30 MIN: CPT | Mod: S$GLB,,, | Performed by: NURSE PRACTITIONER

## 2022-05-10 PROCEDURE — 1125F PR PAIN SEVERITY QUANTIFIED, PAIN PRESENT: ICD-10-PCS | Mod: CPTII,S$GLB,, | Performed by: NURSE PRACTITIONER

## 2022-05-10 PROCEDURE — 1159F MED LIST DOCD IN RCRD: CPT | Mod: CPTII,S$GLB,, | Performed by: NURSE PRACTITIONER

## 2022-05-10 PROCEDURE — 3061F PR NEG MICROALBUMINURIA RESULT DOCUMENTED/REVIEW: ICD-10-PCS | Mod: CPTII,S$GLB,, | Performed by: NURSE PRACTITIONER

## 2022-05-10 PROCEDURE — 1101F PR PT FALLS ASSESS DOC 0-1 FALLS W/OUT INJ PAST YR: ICD-10-PCS | Mod: CPTII,S$GLB,, | Performed by: NURSE PRACTITIONER

## 2022-05-10 PROCEDURE — 99999 PR PBB SHADOW E&M-EST. PATIENT-LVL V: ICD-10-PCS | Mod: PBBFAC,,, | Performed by: NURSE PRACTITIONER

## 2022-05-10 NOTE — ASSESSMENT & PLAN NOTE
-- Continue fosamax weekly  -- Reviewed basics of quantity versus quality  -- Reassuring she is not fracturing   -- RDA of calcium (0557-2654 mg /day in divided doses) and vitamin D 2000iu a day  discussed  -- Calcium from food sources preferred  -- Fall precautions emphasized  -- +weight bearing exercise  -- Repeat DEXA scan in May 2023

## 2022-05-10 NOTE — PATIENT INSTRUCTIONS
Diabetes  Discussed her A1c is at goal. Fasting blood sugar on her lab was at goal at 98 but lower end of normal. Discussed I am fearful of hypoglycemia. We will perform a professional continuous glucose monitor in order to assess blood sugar trends and patterns, highs and lows, and determine treatment plan.      Continue   Trulicity 4.5 mg weekly  Glipizide 10 mg twice daily  basaglar 22 units daily      Consult GI for diarrhea as she reports this has been ongoing for years    Osteoporosis               Continue Vitamin D 2000 IU twice weekly               RDA of calcium is 6465-8847 mg daily, preferable from food               Avoid alcohol and tobacco and falls              Continue fosamax              Check bone density in May 2023                  Snacks can be an important part of a balanced, healthy meal plan. They allow you to eat more frequently, feeling full and satisfied throughout the day. Also, they allow you to spread carbohydrates evenly, which may stabilize blood sugars.  Plus, snacks are enjoyable!     The amount of carbohydrate needed at snacks varies. Generally, about 15-30 grams of carbohydrate per snack is recommended.  Below you will find some tasty treats.       0-5 gm carb  Crystal Light  Vitamin Water Zero  Herbal tea, unsweetened  2 tsp peanut butter on celery  1./2 cup sugar-free jell-o  1 sugar-free popsicle  ¼ cup blueberries  8oz Blue Jennifer unsweetened almond milk  5 baby carrots & celery sticks, cucumbers, bell peppers dipped in ¼ cup salsa, 2Tbsp light ranch dressing or 2Tbsp plain Greek yogurt  10 Goldfish crackers  ½ oz low-fat cheese or string cheese  1 closed handful of nuts, unsalted  1 Tbsp of sunflower seeds, unsalted  1 cup Smart Pop popcorn  1 whole grain brown rice cake        15 gm carb  1 small piece of fruit or ½ banana or 1/2 cup lite canned fruit  3 jesse cracker squares  3 cups Smart Pop popcorn, top spray butter, [place] lite salt or cinnamon and Truvia  5 Vanilla  Wafers  ½ cup low fat, no added sugar ice cream or frozen yogurt (Blue bell, Blue Bunny, Weight Watchers, Skinny Cow)  ½ turkey, ham, or chicken sandwich  ½ c fruit with ½ c Cottage cheese  4-6 unsalted wheat crackers with 1 oz low fat cheese or 1 tbsp peanut butter   30-45 goldfish crackers (depending on flavor)   7-8 Taoist mini brown rice cakes (caramel, apple cinnamon, chocolate)   12 Taoist mini brown rice cakes (cheddar, bbq, ranch)   1/3 cup hummus dip with raw veg  1/2 whole wheat yudy, 1Tbsp hummus  Mini Pizza (1/2 whole wheat English muffin, low-fat  cheese, tomato sauce)  100 calorie snack pack (Oreo, Chips Ahoy, Ritz Mix, Baked Cheetos)  4-6 oz. light or Greek Style yogurt (Chobani, Yoplait, Okios, [place])  ½ cup sugar-free pudding    6 in. wheat tortilla or yudy oven toasted chips (topped with spray butter flavoring, cinnamon, Truvia OR spray butter, garlic powder, chili powder)   18 BBQ Popchips (available at [employer], [employer], Fresh Market)     Eating the Right Number of Calories (8620-2481 Guidelines)    Calories are a measure of the energy you get from food. If you eat more calories than you use, you will gain weight. If you eat fewer calories than you use, you will lose weight. Below are tables that give the number of calories needed each day. Look for your gender, age, and activity level. If you stick to this number, you should neither gain nor lose weight. Note that this is an estimated number of calories.* Your exact number may differ.    Women  Age in years Low activity level (calories/day) Moderate activity level (calories/day) High activity level (calories/day)   19 to 30 1,800-2,000 2,000-2,200 2,400   31 to 50 1,800 2,000 2,200   51 and older 1,600 1,800 2,000-2,200      Men  Age in years Low activity level  (calories/day) Moderate activity level (calories/day) High activity level (calories/day)   19 to 30 2,400-2,600 2,600-2,800 3,000   31 to 50 2,200-2,400 2,400-2,600 2,800-3,000    51 and older 2,000-2,200 2,200-2,400 2,400-2,800     Activity levels defined  Low. Only light physical activity such as that done during typical daily life.  Moderate. Light physical activity done during typical daily life AND physical activity equal to walking about 1.5 to 3 miles a day at 3 to 4 miles per hour.  High. Light physical activity done during typical daily life AND physical activity equal to walking more than 3 miles a day at 3 to 4 miles per hour.  *From Dietary Guidelines for Americans, 9041-9154, [place] Department of Health and Human Services.    Eat less fat  A gram of fat has almost 2.5 times the calories of a gram of protein or carbohydrates. Try to balance your food choices so that only 20% to 35% of your calories comes from total fat. This means an average of 2½ to 3½ grams of fat for each 100 calories you eat.    Eat more fiber  High-fiber foods are digested more slowly than low-fiber foods, so you feel full longer. Try to get at least 25 grams of fiber each day for a 2000 calorie diet. Foods high in fiber include:  Vegetables and fruits  Whole-grain or bran breads, pastas, and cereals  Legumes (beans) and peas  As you begin to eat more fiber, be sure to drink plenty of water to keep your digestive system working smoothly.    Tips  Do's and don'ts include:   Dont skip meals. This often leads to overeating later on. Its best to spread your eating throughout the day.  Eat a variety of foods, not just a few favorites.  If you find yourself eating when youre not hungry, ask yourself why. Many of us eat when were bored, stressed, or just to be polite. Listen to your body. If youre not hungry, get busy doing something else instead of eating.  Eat slower, shooting for 20 to 30 minutes for each meal. It takes 20 minutes for your stomach to tell your brain that its full. Slow eaters tend to eat less and are still satisfied, while fast eaters may tend to be overeaters.   Pay attention to what you  eat. Dont read or watch TV during your meal.     ---------------------------------------------------------------------------------------------------------------------------------------------------    Losing Weight for Heart Health  Excess weight is a major risk factor for heart disease. Losing weight has many benefits including lowering your blood pressure, improving your cholesterol level, and decreasing your risk for diseases such as diabetes and heart disease. It may help keep your arteries open so that your heart can get the oxygen-rich blood it needs. All in all, losing weight makes you healthier.       Exercise with a friend. When activity is fun, you're more likely to stick with it.     Calories and weight loss  Calories are the fuel your body burns for energy. You get the calories you need from the food you eat. For healthy weight loss, women should eat at least 1,200 calories a day, men at least 1,500.  When you eat more calories than you need, your body stores the extra calories as fat. One pound of fat equals 3,500 calories.  To lose weight, try to reduce your total calorie intake by 500 calories. To do this, eat 250 calories less each day. Add activity to burn the other 250 calories. Walking 2.5 miles burns about 250 calories. Other more intense activities can burn more calories in the time you spend doing them, such as swimming and running. It is important to understand that reducing calorie intake is much more effective at weight loss than is exercise.  Eat a variety of healthy foods to get the nutrients you need.    Tips for losing weight  Drink 8 to 10 glasses of water a day.  Dont skip meals. Instead, eat smaller portions.  Eat your meals earlier in the day.  Cut out sugary drinks such as soda and fruit juices.  Make your later meals lighter than your earlier meals.     What can exercise help?  Blood sugar. Regular exercise improves blood sugar control by helping your body use insulin.  Mental and  emotional health. Physical activity relieves stress and helps you sleep better.  Heart health. With regular exercise, you can reduce your risk of heart disease and high blood pressure. You can also improve your cholesterol and triglyceride levels.  Weight. Exercise helps you lose fat, gain muscle, and control your weight.  Health of blood vessels and nerves. Activity helps lower blood sugar. This helps prevent damage to blood vessels and nerves that can cause problems with your brain, eyes, feet, and legs.  Finances. If you manage your blood sugar, you may spend less on medical care.    2 types of exercise  Two types of exercise help your body use blood sugar. Experts advise both types of exercise for people with diabetes:  Aerobic exercise. This is a rhythmic, repeated, continued movement of large muscle groups for at least 10 minutes at a time. You should do this about 30 minutes a day on most days of the week. Examples include walking, bicycling, jogging, swimming, water aerobics, and many sports.  Resistance exercise (strength training). This type of exercise uses muscles to move weight or work against resistance. You can do it with free weights, machines, resistance tubing, or your own body weight. Adults with diabetes should aim for 2 to 3 sessions of resistance exercise each week. Its best to skip a day in between.    A goal to shoot for  Your main goal is to become more active. Even a little bit helps. Choose an activity that you like. Walking is one great form of exercise that everyone can do. Talk to your healthcare provider about any limits you may have before starting with an exercise program. Then aim for 150 minutes a week of physical activity. Dont let more than 2 days go by without exercise. When you are sitting for long periods of time, get up for short sessions of light activity every 30 minutes.    Getting activity into your day  Being more active doesnt have to be hard work. Try these to get  more activity into your day:  Take the stairs instead of the elevator  Garden, do housework, and yard work  Choose a parking space farther from the store  Walk to talk to a co-worker instead of calling  Take a 10-minute walk around the block at lunch  Walk to a bus stop a little farther from your home or office  Walk the dog after dinner     ---------------------------------------------------------------------------------------------------------------------------------------------------    Reading Food Labels  Look for the Nutrition Facts label on packaged foods. Reading labels is a big step toward eating healthier. The tips below help you know what to look for.    Serving size. Read this closely because the package, jar, or can may contain more than 1 serving. This is how to measure 1 serving of the food in the package. If you eat more than 1 serving, you get more of everything on the label -- including fat, cholesterol, and calories.  Total fat. This tells you how many grams (g) of fat are in 1 serving. Fat is high in calories. A healthy goal is to have less than 25% of your daily calories come from fat.  Saturated fat. This tells you how much saturated fat is in 1 serving. Saturated fat raises your cholesterol the most. Look for foods that have little or no saturated fat.  Trans fat. This tells you how much trans fat is in 1 serving. Even a small amount of trans fat can harm your health. Choose foods that have no trans fat.  Cholesterol. This tells you how much cholesterol is in 1 serving. For many years, it had been recommended to eat less than 300 milligrams (mg) of cholesterol a day. New guidelines have removed this limitation as cholesterol has been recently shown to not raise blood cholesterol levels as significantly as previously thought. However, many foods high in cholesterol are also high in saturated fat. It is recommended to limit saturated fat in your diet.  Calories from fat. This number tells you  how many calories from fat are in 1 serving (there are 9 calories per gram of fat). Look for foods with few calories from fat.  % Daily value. The higher the number, the more 1 serving has of that nutrient. Look for foods that have low numbers for total fat, saturated fat, cholesterol, and sodium.  Sodium. This tells you how much sodium (salt) is in 1 serving. Choose foods with low numbers for sodium.  Dietary fiber. This number tells you how much fiber is in 1 serving. Foods that are high in fiber can help you feel full. They can also be good for your heart and digestion. The recommended daily amount of fiber is 25 grams for women and 38 grams for men. After age 50, your daily fiber needs drop to 21 grams for women and 30 grams for men.       ---------------------------------------------------------------------------------------------------------------------------------------------------    Understanding Carbohydrates, Fats, and Protein  Food is a source of fuel and nourishment for your body. Its also a source of pleasure. Having diabetes doesnt mean you have to eat special foods or give up desserts. Instead, your dietitian can show you how to plan meals to suit your body. To start, learn how different foods affect blood sugar.    Carbohydrates  Carbohydrates are the main source of fuel for the body. Carbohydrates raise blood sugar. Many people think carbohydrates are only found in pasta or bread. But carbohydrates are actually in many kinds of foods:  Sugars occur naturally in foods such as fruit, milk, honey, and molasses. Sugars can also be added to many foods, from cereals and yogurt to candy and desserts. Sugars raise blood sugar.  Starches are found in bread, cereals, pasta, and dried beans. Theyre also found in corn, peas, potatoes, yam, acorn squash, and butternut squash. Starches also raise blood sugar.   Fiber is found in foods such as vegetables, fruits, beans, and whole grains. Unlike other carbs,  fiber isnt digested or absorbed. So it doesnt raise blood sugar. In fact, fiber can help keep blood sugar from rising too fast. It also helps keep blood cholesterol at a healthy level.  Did you know?  Even though carbohydrates raise blood sugar, its best to have some in every meal. They are an important part of a healthy diet.     Fat  Fat is an energy source that can be stored until needed. Fat does not raise blood sugar. However, it can raise blood cholesterol, increasing the risk of heart disease. Fat is also high in calories, which can cause weight gain. Not all types of fat are the same.    More Healthy:  Monounsaturated fats are mostly found in vegetable oils, such as olive, canola, and peanut oils. They are also found in avocados and some nuts. Monounsaturated fats are healthy for your heart. Thats because they lower LDL (unhealthy) cholesterol.  Polyunsaturated fats are mostly found in vegetable oils, such as corn, safflower, and soybean oils. They are also found in some seeds, nuts, and fish. Polyunsaturated fats lower LDL (unhealthy) cholesterol. So, choosing them instead of saturated fats is healthy for your heart. Certain unsaturated fats can help lower triglycerides.     Less Healthy:  Saturated fats are found in animal products, such as meat, poultry, whole milk, lard, and butter. Saturated fats raise LDL cholesterol and are not healthy for your heart.  Hydrogenated oils and trans fats are formed when vegetable oils are processed into solid fats. They are found in many processed foods. Hydrogenated oils and trans fats raise LDL cholesterol and lower HDL (healthy) cholesterol. They are not healthy for your heart.    Protein  Protein helps the body build and repair muscle and other tissue. Protein has little or no effect on blood sugar. However, many foods that contain protein also contain saturated fat. By choosing low-fat protein sources, you can get the benefits of protein without the extra  fat:  Plant protein is found in dry beans and peas, nuts, and soy products, such as tofu and soymilk. These sources tend to be cholesterol-free and low in saturated fat.  Animal protein is found in fish, poultry, meat, cheese, milk, and eggs. These contain cholesterol and can be high in saturated fat. Aim for lean, lower-fat choices.    ---------------------------------------------------------------------------------------------------------------------------------------------------    Understanding Carbohydrates    A car needs the right type of fuel to run. And you need the right kind of food to function. To keep your energy level up, your body needs food that has carbohydrates. But carbohydrates raise blood sugar levels higher and faster than other kinds of food. Your dietitian will work with you to figure out the amount of carbohydrates you need.    Starches  Starches are found in grains, some vegetables, and beans. Grain products include bread, pasta, cereal, and tortillas. Starchy vegetables include potatoes, peas, corn, lima beans, yams, and squash. Kidney beans, mederos beans, black beans, garbanzo beans, and lentils also contain starches.    Sugars  Sugars are found naturally in many foods. Or sugar can be added. Foods that contain natural sugar include fruits and fruit juices, dairy products, honey, and molasses. Added sugars are found in most desserts, processed foods, candy, regular soda, and fruit drinks. These are very helpful for treating low blood sugar, or hypoglycemia. They provide sugar quickly. Try to keep at least 15 to 20 grams of these simple sugars with you at all times. Eat this if you begin to have low blood sugar symptoms.    Fiber  Fiber comes from plant foods. Most fiber isnt digested by the body. Instead of raising blood sugar levels like other carbohydrates, it actually stops blood sugar from rising too fast. Fiber is found in fruits, vegetables, whole grains, beans, peas, and many  nuts.    Carb counting  Keep track of the amount of carbohydrates you eat. This can help you keep the right balance of physical activity and medicine. The amount of carbohydrates needed will vary for each person. It depends on many things such as your health, the medicines you take, and how active you are. Your healthcare team will help you figure out the right amount of carbohydrates for you. You may start with around 45 to 60 grams of carbohydrate per meal, depending on your situation. Carb counting is a system that helps you keep track of the carbohydrates you eat at each meal.  Carbohydrates come from a variety of foods. These include grains, starchy vegetables, fruit, milk, beans, and snack foods. You can either count carbohydrate grams or carbohydrate servings. When you count carbohydrate servings, 1 carbohydrate serving = 15 grams of carbohydrates.  Here are some examples of foods containing about 15 grams of carbohydrates (1 serving of carbohydrates):  1/2 cup of canned or frozen fruit  A small piece of fresh fruit (4 ounces)  1 slice of bread  1/2 cup of oatmeal  1/3 cup of rice  4 to 6 crackers  1/2 English muffin  1/2 cup of black beans  1/4 of a large baked potato (3 ounces)  2/3 cup of plain fat-free yogurt  1 cup of soup  1/2 cup of casserole  6 chicken nuggets  2-inch-square brownie or cake without frosting  2 small cookies  1/2 cup of ice cream or sherbet    Carb counting is easier when food labels are available. Look at the label to see how many grams of total carbohydrates the food contains. Then you can figure out how much you should eat.  Two very important lines to look at on the label are the serving size and the total carbohydrate amount. Here are some tips for using food labels to count your carbohydrate intake:  Check the serving size. The information on the label is based on that serving size. If you eat more than the listed serving size, you may have to double or triple the other  information on the label.   Check the total grams of carbohydrates. Total carbohydrate from the label includes sugar, starch, and fiber. Be sure to use the total carbohydrate number and not sugar alone.  Know how many grams of carbohydrates you can have.  Be familiar with the matching portion sizes.  Compare labels. Compare the labels of different products, looking at serving sizes and total carbohydrates to find the products that work best for you.   Don't forget protein and fat. With all the focus on carb counting, it might be easy to forget protein and fat in your meals. Don't forget to include sources of protein and healthy fat to balance your meals.  Its also important to be consistent with the amount and time you eat when taking a fixed dose of diabetes medicine. Work with your healthcare provider or dietitian if you need additional help. He or she can help you keep track of your carbohydrate intake. He or she can also help you figure out how many grams of carbohydrates you should have.      ---------------------------------------------------------------------------------------------------------------------------------------------------    Diabetes: Learning About Serving and Portion Sizes     A good rule of thumb: Devote half your plate to vegetables and green salad. Split the other half between protein and starchy carbohydrates. Fruit makes a good dessert.     Servings and portions. Whats the difference? These terms can be very confusing. But learning to measure serving sizes can help you figure out how many carbohydrates (carbs) and other foods you eat each day. They are also powerful tools for managing your weight.    Servings and portions  Many different words are used to describe amounts of food. If your health care provider uses a term youre not sure of, dont be afraid to ask. It helps to know the difference between servings and portions:  A serving size is a fixed size. Food producers use this  term to describe their products. For example, the label on a cereal box could say that 1 cup of dry cereal = 1 serving.  A portion (also called a helping) is how much you eat or how much you put on your plate at a meal. For example, you might eat 2 cups of cereal at breakfast.    Using serving information  The portion you choose to eat (such as 2 cups of cereal) may be more than one serving as listed on the food label (such as 1 cup of cereal). Thats why it helps to measure or weigh the food you eat. Because the food label values are based on servings, youll need to know how many servings you eat at one sitting.     Ounces: 2 to 3 ounces is about the size of your palm.       1 Cup: 1 cup (or a medium-sized piece) is about the size of your fist.       1/2 Cup: 1/2 cup is about the size of your cupped hand.      One tablespoon is about the size of your thumb.  One teaspoon is about the size of the tip of your thumb.    Keeping track of serving sizes  When youre planning for a snack or a meal, keep servings in mind. If you dont have measuring cups or a scale handy, there are ways to eyeball serving sizes, such as comparing your food to the size of your hand (see pictures above).    Managing portion sizes  If your weight is a concern, reducing your portions can help. You can eat more than one serving of a food at once. But to keep from eating too much at one meal, learn how to manage your portions. A portion is the amount of each type of food on your plate. See the plate diagram for an example of balanced portions.    ---------------------------------------------------------------------------------------------------------------------------------------------------    Diabetes: Shopping for and Preparing Meals    Having diabetes doesnt mean you have to shop in a special aisle or look for special foods. But you will need to make choices. By comparing items and reading food labels, you can find the healthiest foods  "for you and your family.  Comparing items  When you shop, compare items to find the best ones for your needs. Keep these facts in mind:  No sugar added does not mean a product is sugar-free.  "Sugar-free" means less than 1/2 gram (g) of sugar per serving.  Fat free means less than 1/2 g of fat per serving. This does not necessarily mean the product is low in calories.  Low fat means 3 g fat or less per serving. Reduced fat or less fat means 25% less fat than the regular version. Some of this fat may be saturated or trans fat. And calories per serving may be similar to the regular version.    Making small changes  Dont try to change all of your eating habits at once. Here are some ideas to start with:  Try fat-free or low-fat cheese, milk, and yogurt. Also try leaner cuts of meat. This will help you cut down on saturated fat.  Try whole-grain breads, brown rice, and whole-wheat pasta.  Load up on fresh or frozen vegetables. If you buy canned, choose low-sodium vegetables.  Avoid processed foods as much as possible. They tend to be low in fiber and high in trans fats and sodium.  Try tofu, soymilk, or meat substitutes.?They can help you cut cholesterol and saturated fat out of your diet.    Learning to read food labels  To find healthy foods that help you control blood sugar, learn how to read food labels. Look for the Nutrition Facts label on packaged foods. It will tell you how much carbohydrate, sugar, fat, and fiber is in each serving. Then, you can decide whether or not the food fits into your meal plan.    Using the food label  So, once you have the food label, what do you do with it? The food label helps in many ways. Use it to:  Compare items and decide which is the best for your health needs.  Track the number of carbohydrates in your portions.  Figure out how many servings of a food you can have and still stay within the number of carbohydrates for that meal.    Planning meals  For good blood " sugar control, plan what and when youll eat. Start by creating a meal plan that includes all the food groups. Then, time your meals to help keep your blood sugar level steady. You may need to adjust your plan for special situations.    Eat from all the food groups  The basis of a healthy meal plan is variety (eating many different types of foods). Look for lean meats, fresh fruits and vegetables, whole grains, and low-fat or non-fat dairy products. Eating a wide variety of foods provides the nutrients your body needs. It can also keep you from getting bored with your meal plan.    Reduce liquid sugars  Extra calories from sodas, sports drinks, and fruit drinks make it hard to keep blood sugar in range. Cut as many liquid sugars from your meal plan as you can. This includes most fruit juices, which are often high in natural or added sugar. Instead, drink plenty of water and other sugar-free beverages.    Eat less fat  If you need to lose some weight, try to reduce the amount of fat in your diet. This can also help lower your cholesterol level to keep blood vessels healthier. Cut fat by using only small amounts of liquid oil for cooking. Read food labels carefully to avoid foods with unhealthy trans fats.    Timing your meals  When it comes to blood sugar control, when you eat is as important as what you eat. You may need to eat several small meals spaced evenly throughout the day to stay in your target range. So dont skip breakfast or wait until late in the day to get most of your calories. Doing so can cause your blood sugar to rise too high or fall too low.    Cooking wisely  Broil, steam, bake, or grill meats and vegetables, instead of frying.  Instead of cream-based sauces or sugary glazes, flavor foods with vegetable purée, lemon or lime juice, or herb seasonings.  Remove skin from chicken and turkey before serving.  Look in cookbooks for easy, low-fat, low-sugar recipes. When making your usual recipes, cut  sugar by 1/2 and fat by 1/3.      ---------------------------------------------------------------------------------------------------------------------------------------------------    Healthy Meals for Diabetes    Ask your healthcare team to help you make a meal plan that fits your needs. Your meal plan tells you when to eat your meals and snacks, what kinds of foods to eat, and how much of each food to eat. You dont have to give up all the foods you like. But you do need to follow some guidelines.  Choose healthy carbohydrates  Starches, sugars, and fiber are all types of carbohydrates. Fiber can help lower your cholesterol and triglycerides. Fiber is also healthy for your heart. You should have 20 to 35 grams of total fiber each day. Fiber-rich foods include:  Whole-grain breads and cereals  Bulgur wheat  Brown rice     Whole-wheat pasta  Fruits and vegetables  Dry beans, and peas   Keep track of the amount of carbohydrates you eat. This can help you keep the right balance of physical activity and medicine. The amount of carbohydrates needed will vary for each person. It depends on many things such as your health, the medicines you take, and how active you are. Your healthcare team will help you figure out the right amount of carbohydrates for you. You may start with around 45 to 60 grams of carbohydrates per meal, depending on your situation.   Here are some examples of foods containing about 15 grams of carbohydrates (1 serving of carbohydrates):  1/2 cup of canned or frozen fruit  A small piece of fresh fruit (4 ounces)  1 slice of bread  1/2 cup of oatmeal  1/3 cup of rice  4 to 6 crackers  1/2 English muffin  1/2 cup of black beans  1/4 of a large baked potato (3 ounces)  2/3 cup of plain fat-free yogurt  1 cup of soup  1/2 cup of casserole  6 chicken nuggets  2-inch-square brownie or cake without frosting  2 small cookies  1/2 cup of ice cream or sherbet    Choose healthy protein foods  Eating protein that  is low in fat can help you control your weight. It also helps keep your heart healthy. Low-fat protein foods include:  Fish  Plant proteins, such as dry beans and peas, nuts, and soy products like tofu and soymilk  Lean meat with all visible fat removed  Poultry with the skin removed  Low-fat or nonfat milk, cheese, and yogurt    Limit unhealthy fats and sugar  Saturated and trans fats are unhealthy for your heart. They raise LDL (bad) cholesterol. Fat is also high in calories, so it can make you gain weight. To cut down on unhealthy fats and sugar, limit these foods:  Butter or margarine  Palm and palm kernel oils and coconut oil  Cream  Cheese  Gil  Lunch meats     Ice cream  Sweet bakery goods such as pies, muffins, and donuts  Jams and jellies  Candy bars  Regular sodas     How much to eat  The amount of food you eat affects your blood sugar. It also affects your weight. Your healthcare team will tell you how much of each type of food you should eat.  Use measuring cups and spoons and a food scale to measure serving sizes.  Learn what a correct serving size looks like on your plate. This will help when you are away from home and cant measure your servings.  Eat only the number of servings given on your meal plan for each food. Dont take seconds.  Learn to read food labels. Be sure to look at serving size, total carbohydrates, fiber, calories, sugar, and saturated and trans fats. Look for healthier alternatives to foods that have added sugar.  Plan ahead for parties so you can still have a good time without going overboard with unhealthy food choices. Set a good example yourself by bringing a healthy dish to pot lucks.     Choose healthy snacks  When it comes to snacks, we usually think about foods with added sugar and fats. But there are many other options for healthier snack choices. Here are a few snack ideas to choose from:  Snacks with less than 5 grams of carbohydrates  1 piece of string cheese  3  celery sticks plus 1 tablespoon of peanut butter  5 cherry tomatoes plus 1 tablespoon of ranch dressing  1 hard-boiled egg  1/4 cup of fresh blueberries   5 baby carrots  1 cup of light popcorn  1/2 cup of sugar-free gelatin  15 almonds  Snacks with about 10 to 20 grams of carbohydrates  1/3 cup of hummus plus 1 cup of fresh cut nonstarchy vegetables (carrots, green peppers, broccoli, celery, or a combination)  1/2 cup of fresh or canned fruit plus 1/4 cup of cottage cheese  1/2 cup of tuna salad with 4 crackers  2 rice cakes and a tablespoon of peanut butter  1 small apple or orange  3 cups light popcorn  1/2 of a turkey sandwich (1 slice of whole-wheat bread, 2 ounces of turkey, and mustard)  Portion sizes are important to controlling your blood sugar and staying at a healthy weight. Stock up on healthy snack items so you always have them on hand.    When to eat  Your meal plan will likely include breakfast, lunch, dinner, and some snacks.  Try to eat your meals and snacks at about the same times each day.  Eat all your meals and snacks. Skipping a meal or snack can make your blood sugar drop too low. It can also cause you to eat too much at the next meal or snack. Then your blood sugar could get too high.    ---------------------------------------------------------------------------------------------------------------------------------------------------    Eating Out When You Have Diabetes  Eating right is an important part of keeping your blood sugar in your target range. You just need to make healthy choices.    Be creative when eating out. Many places dont make you stick strictly to the menu. Instead of ordering a large entree, choose an appetizer or two and a bowl of soup. A mix of side orders can also make a good meal. Read the descriptions of other entrees and specials. If another entree comes with baby carrots, ask for a side order of them even if they dont come with your entree. Ask how food is prepared  or if it can be made differently.  Tips for making the most of your meal out  Ask to have high-fat, high-calorie extras, such as French fries and potato chips, left off your plate so you wont be tempted.   Ask what substitutions are available. Instead of French fries, you may be able to have a side of salad with low-calorie dressing.  Order low-fat milk instead of cream for your coffee.  Ask for vegetables and main courses to be served without sauces, butter, margarine, or oil.  Be aware of portion size. You dont have to clean your plate. Take half your meal home in a doggie bag to eat the next day.  Look for heart-healthy or low-fat entrees that include whole grains, vegetables, or fruits.  Choose foods that are grilled, broiled, or steamed.  Avoid dishes that are described on the menu as fried, breaded, smothered, rich, or creamy.    Tips for restaurant meals  When you eat away from home try these tips:  Try to schedule your dining-out meal at your normal meal time. Make a reservation if possible, so you don't have to wait to eat. If you can't make a reservation, try to arrive at the restaurant at a less-busy time to cut down your wait time. Eat a small fruit or starch snack at your regular mealtime if your restaurant meal is going to be later than usual.   Call ahead to see if the restaurant can meet your dietary needs if you've never been there before. Or you can go online to see the menu ahead of time.  Carry some crackers with you in case the restaurant needs you to wait until you can be served.  Ask how foods are prepared before you order.  Instead of fried, sautéed, or breaded foods, choose ones that are broiled, steamed, grilled, or baked.  Ask for sauces, gravies, and dressings on the side.  Only eat an amount that fits your meal plan. Remember: You can take home the leftovers.  Save dessert for special occasions. Then choose a small dessert or share one with a friend or family member.    Make healthy  choices  Fast food  Garden salad with light dressing on the side  Baked potato with vegetables or herbs  Broiled, roasted, or grilled chicken sandwich  Sliced turkey or lean roast beef sandwich    Mexican  Chicken enchilada, without cheese or sour cream   Small burrito with whole beans and chicken  Whole beans (not refried) and rice  Chicken or fish fajitas    Steakhouse  Grilled or broiled lean cuts of beef  Baked potato with vegetables or herbs  Broiled or baked chicken. Dont eat the skin.  Steamed vegetables    Asian  Steamed dumplings or potstickers  Broiled, boiled, or steamed meats or fish  Sushi or sashimi  Steamed rice or boiled noodles. One serving is equal to 1/3 cup.      © 2496-0724 [employer] [address] [place] [address], [place] [address]. All rights reserved. This information is not intended as a substitute for professional medical care. Always follow your healthcare professional's instructions.

## 2022-05-10 NOTE — ASSESSMENT & PLAN NOTE
-- Labs prior  -- A1c 7-7.5% without hypoglycemia.   -- Medication Changes:     Discussed her A1c is at goal. Fasting blood sugar on her lab was at goal at 98 but lower end of normal. Discussed I am fearful of hypoglycemia. We will perform a professional continuous glucose monitor in order to assess blood sugar trends and patterns, highs and lows, and determine treatment plan.      Continue   Trulicity 4.5 mg weekly  Glipizide 10 mg twice daily  basaglar 22 units daily      Consult GI for diarrhea as she reports this has been ongoing for years    Message me for any blood sugar less than 70 and we will adjustments to your regimen. Message me for persistent blood sugars above 180.  -- We will continue novolog for steroid injections. Discussed she will take the novolog before meals with sliding scale below.  With using correction scale:  MDD of:   150-200: +2 units  201-250: +4 units  251-300: +6 units  301-350: +8 units  >350: +10 units    -- Has GVOKE pen.   -- She is fearful of DKA Discussed signs and symptoms of DKA and instructed to buy ketone strips  -- Reviewed goals of therapy are to get the best control we can without hypoglycemia  -- Insulin injection sites and proper rotation instructed.    -- Advised frequent self blood glucose monitoring.  Patient encouraged to document glucose results and bring them to every clinic visit.  -- Hypoglycemia precautions discussed. Instructed on precautions before driving.    -- Call for Bg repeatedly < 90 or > 180.   -- Close adherence to lifestyle changes recommended.   -- Periodic follow ups for eye evaluations, foot care and dental care suggested. [facility]

## 2022-05-14 NOTE — PROGRESS NOTES
TTE results reviewed; mild AS and CVP 8. Try lasix 40 mg for 3 days then back to 20 mg every other day. She continues to eat out as they are tired of cooking at home. Her  and son both have COVID and are isolating.

## 2022-05-16 ENCOUNTER — PATIENT MESSAGE (OUTPATIENT)
Dept: CARDIOLOGY | Facility: CLINIC | Age: 77
End: 2022-05-16
Payer: MEDICARE

## 2022-05-16 ENCOUNTER — PATIENT MESSAGE (OUTPATIENT)
Dept: ENDOCRINOLOGY | Facility: CLINIC | Age: 77
End: 2022-05-16
Payer: MEDICARE

## 2022-05-16 ENCOUNTER — TELEPHONE (OUTPATIENT)
Dept: ENDOCRINOLOGY | Facility: CLINIC | Age: 77
End: 2022-05-16
Payer: MEDICARE

## 2022-05-24 ENCOUNTER — TELEPHONE (OUTPATIENT)
Dept: PRIMARY CARE CLINIC | Facility: CLINIC | Age: 77
End: 2022-05-24
Payer: MEDICARE

## 2022-05-24 DIAGNOSIS — Z12.31 ENCOUNTER FOR SCREENING MAMMOGRAM FOR BREAST CANCER: Primary | ICD-10-CM

## 2022-05-24 NOTE — TELEPHONE ENCOUNTER
----- Message from Dick Luu sent at 5/24/2022  1:17 PM CDT -----  Contact: @[phone]  Pt requesting a call back to have orders placed in the system so she can schedule a mammogram.  Please call to discuss further.

## 2022-05-26 NOTE — TELEPHONE ENCOUNTER
Pt is requesting to have mmg done on 5/31 at 1130 if at [facility] after 1pm

## 2022-05-27 DIAGNOSIS — Z95.1 POSTSURGICAL AORTOCORONARY BYPASS STATUS: Primary | ICD-10-CM

## 2022-05-27 DIAGNOSIS — I25.10 CORONARY ARTERY DISEASE INVOLVING NATIVE CORONARY ARTERY OF NATIVE HEART WITHOUT ANGINA PECTORIS: ICD-10-CM

## 2022-05-27 DIAGNOSIS — I21.4 NSTEMI (NON-ST ELEVATED MYOCARDIAL INFARCTION): Primary | ICD-10-CM

## 2022-05-29 ENCOUNTER — PATIENT MESSAGE (OUTPATIENT)
Dept: CARDIOLOGY | Facility: CLINIC | Age: 77
End: 2022-05-29
Payer: MEDICARE

## 2022-05-30 ENCOUNTER — PATIENT MESSAGE (OUTPATIENT)
Dept: ENDOCRINOLOGY | Facility: CLINIC | Age: 77
End: 2022-05-30
Payer: MEDICARE

## 2022-05-31 ENCOUNTER — OFFICE VISIT (OUTPATIENT)
Dept: GASTROENTEROLOGY | Facility: CLINIC | Age: 77
End: 2022-05-31
Payer: MEDICARE

## 2022-05-31 ENCOUNTER — CLINICAL SUPPORT (OUTPATIENT)
Dept: ENDOCRINOLOGY | Facility: CLINIC | Age: 77
End: 2022-05-31
Payer: MEDICARE

## 2022-05-31 ENCOUNTER — HOSPITAL ENCOUNTER (OUTPATIENT)
Dept: RADIOLOGY | Facility: HOSPITAL | Age: 77
Discharge: HOME | End: 2022-05-31
Attending: INTERNAL MEDICINE
Payer: MEDICARE

## 2022-05-31 VITALS — BODY MASS INDEX: 43.79 KG/M2 | HEIGHT: 62 IN | WEIGHT: 238 LBS

## 2022-05-31 VITALS
BODY MASS INDEX: 43.79 KG/M2 | SYSTOLIC BLOOD PRESSURE: 122 MMHG | WEIGHT: 238 LBS | DIASTOLIC BLOOD PRESSURE: 77 MMHG | HEART RATE: 92 BPM | HEIGHT: 62 IN

## 2022-05-31 DIAGNOSIS — Z12.31 ENCOUNTER FOR SCREENING MAMMOGRAM FOR BREAST CANCER: ICD-10-CM

## 2022-05-31 DIAGNOSIS — K58.0 IRRITABLE BOWEL SYNDROME WITH DIARRHEA: ICD-10-CM

## 2022-05-31 DIAGNOSIS — R15.2 FECAL URGENCY: Primary | ICD-10-CM

## 2022-05-31 DIAGNOSIS — N18.30 TYPE 2 DIABETES MELLITUS WITH STAGE 3 CHRONIC KIDNEY DISEASE AND HYPERTENSION: ICD-10-CM

## 2022-05-31 DIAGNOSIS — E11.22 TYPE 2 DIABETES MELLITUS WITH STAGE 3 CHRONIC KIDNEY DISEASE AND HYPERTENSION: ICD-10-CM

## 2022-05-31 DIAGNOSIS — I12.9 TYPE 2 DIABETES MELLITUS WITH STAGE 3 CHRONIC KIDNEY DISEASE AND HYPERTENSION: ICD-10-CM

## 2022-05-31 PROCEDURE — 77067 MAMMO DIGITAL SCREENING BILAT WITH TOMO: ICD-10-PCS | Mod: 26,,, | Performed by: RADIOLOGY

## 2022-05-31 PROCEDURE — 3078F PR MOST RECENT DIASTOLIC BLOOD PRESSURE < 80 MM HG: ICD-10-PCS | Mod: CPTII,S$GLB,, | Performed by: INTERNAL MEDICINE

## 2022-05-31 PROCEDURE — 4010F PR ACE/ARB THEARPY RXD/TAKEN: ICD-10-PCS | Mod: CPTII,S$GLB,, | Performed by: INTERNAL MEDICINE

## 2022-05-31 PROCEDURE — 1159F MED LIST DOCD IN RCRD: CPT | Mod: CPTII,S$GLB,, | Performed by: INTERNAL MEDICINE

## 2022-05-31 PROCEDURE — 3074F SYST BP LT 130 MM HG: CPT | Mod: CPTII,S$GLB,, | Performed by: INTERNAL MEDICINE

## 2022-05-31 PROCEDURE — 99203 OFFICE O/P NEW LOW 30 MIN: CPT | Mod: S$GLB,,, | Performed by: INTERNAL MEDICINE

## 2022-05-31 PROCEDURE — 3044F HG A1C LEVEL LT 7.0%: CPT | Mod: CPTII,S$GLB,, | Performed by: INTERNAL MEDICINE

## 2022-05-31 PROCEDURE — 77067 SCR MAMMO BI INCL CAD: CPT | Mod: TC

## 2022-05-31 PROCEDURE — 1160F PR REVIEW ALL MEDS BY PRESCRIBER/CLIN PHARMACIST DOCUMENTED: ICD-10-PCS | Mod: CPTII,S$GLB,, | Performed by: INTERNAL MEDICINE

## 2022-05-31 PROCEDURE — 99203 PR OFFICE/OUTPT VISIT, NEW, LEVL III, 30-44 MIN: ICD-10-PCS | Mod: S$GLB,,, | Performed by: INTERNAL MEDICINE

## 2022-05-31 PROCEDURE — 1101F PR PT FALLS ASSESS DOC 0-1 FALLS W/OUT INJ PAST YR: ICD-10-PCS | Mod: CPTII,S$GLB,, | Performed by: INTERNAL MEDICINE

## 2022-05-31 PROCEDURE — 3288F PR FALLS RISK ASSESSMENT DOCUMENTED: ICD-10-PCS | Mod: CPTII,S$GLB,, | Performed by: INTERNAL MEDICINE

## 2022-05-31 PROCEDURE — 3066F PR DOCUMENTATION OF TREATMENT FOR NEPHROPATHY: ICD-10-PCS | Mod: CPTII,S$GLB,, | Performed by: INTERNAL MEDICINE

## 2022-05-31 PROCEDURE — 77063 BREAST TOMOSYNTHESIS BI: CPT | Mod: 26,,, | Performed by: RADIOLOGY

## 2022-05-31 PROCEDURE — 1160F RVW MEDS BY RX/DR IN RCRD: CPT | Mod: CPTII,S$GLB,, | Performed by: INTERNAL MEDICINE

## 2022-05-31 PROCEDURE — 4010F ACE/ARB THERAPY RXD/TAKEN: CPT | Mod: CPTII,S$GLB,, | Performed by: INTERNAL MEDICINE

## 2022-05-31 PROCEDURE — 1159F PR MEDICATION LIST DOCUMENTED IN MEDICAL RECORD: ICD-10-PCS | Mod: CPTII,S$GLB,, | Performed by: INTERNAL MEDICINE

## 2022-05-31 PROCEDURE — 99999 PR PBB SHADOW E&M-EST. PATIENT-LVL V: ICD-10-PCS | Mod: PBBFAC,,, | Performed by: INTERNAL MEDICINE

## 2022-05-31 PROCEDURE — 1101F PT FALLS ASSESS-DOCD LE1/YR: CPT | Mod: CPTII,S$GLB,, | Performed by: INTERNAL MEDICINE

## 2022-05-31 PROCEDURE — 3061F PR NEG MICROALBUMINURIA RESULT DOCUMENTED/REVIEW: ICD-10-PCS | Mod: CPTII,S$GLB,, | Performed by: INTERNAL MEDICINE

## 2022-05-31 PROCEDURE — 99999 PR PBB SHADOW E&M-EST. PATIENT-LVL V: CPT | Mod: PBBFAC,,, | Performed by: INTERNAL MEDICINE

## 2022-05-31 PROCEDURE — 3074F PR MOST RECENT SYSTOLIC BLOOD PRESSURE < 130 MM HG: ICD-10-PCS | Mod: CPTII,S$GLB,, | Performed by: INTERNAL MEDICINE

## 2022-05-31 PROCEDURE — 3066F NEPHROPATHY DOC TX: CPT | Mod: CPTII,S$GLB,, | Performed by: INTERNAL MEDICINE

## 2022-05-31 PROCEDURE — 77067 SCR MAMMO BI INCL CAD: CPT | Mod: 26,,, | Performed by: RADIOLOGY

## 2022-05-31 PROCEDURE — 3061F NEG MICROALBUMINURIA REV: CPT | Mod: CPTII,S$GLB,, | Performed by: INTERNAL MEDICINE

## 2022-05-31 PROCEDURE — 3288F FALL RISK ASSESSMENT DOCD: CPT | Mod: CPTII,S$GLB,, | Performed by: INTERNAL MEDICINE

## 2022-05-31 PROCEDURE — 77063 MAMMO DIGITAL SCREENING BILAT WITH TOMO: ICD-10-PCS | Mod: 26,,, | Performed by: RADIOLOGY

## 2022-05-31 PROCEDURE — 3078F DIAST BP <80 MM HG: CPT | Mod: CPTII,S$GLB,, | Performed by: INTERNAL MEDICINE

## 2022-05-31 PROCEDURE — 3044F PR MOST RECENT HEMOGLOBIN A1C LEVEL <7.0%: ICD-10-PCS | Mod: CPTII,S$GLB,, | Performed by: INTERNAL MEDICINE

## 2022-05-31 PROCEDURE — 77063 BREAST TOMOSYNTHESIS BI: CPT | Mod: TC

## 2022-05-31 RX ORDER — DIPHENOXYLATE HYDROCHLORIDE AND ATROPINE SULFATE 2.5; .025 MG/1; MG/1
1 TABLET ORAL 4 TIMES DAILY PRN
Qty: 30 TABLET | Refills: 0 | Status: SHIPPED | OUTPATIENT
Start: 2022-05-31 | End: 2022-07-09

## 2022-05-31 NOTE — PROGRESS NOTES
[facility]   Clinic Note              Patient Name: Kaylee Romero  Age: 77 y.o.  Sex: female  MRN: [id number]    TODAY'S DATE:  5/31/2022  REFERRING PROVIDER:  Abbey Fox NP     Diagnosis: Abnormal bowel habits    HPI:  Kaylee Romero is a 77 y.o. female with a history of obesity, KAMRAN, IDDM, CAD s/p CABG who was referred for evaluation and treatment of abnormal bowel habits.    For review, patient has never been seen by Gastroenterology at [facility] in the past.  She was recently seen by Endocrinology, endorsing diarrhea for which a referral to GI was placed.    Today, patient is accompanied by her , who notes patient is a retired pHD, previously teaching at [employer].  She notes that for the last 2-3 years, she has noted difficulty with bowel control.  She feels these symptoms of urgency are most prominent when traveling/going out to eat.  This is helped some with lomotil, not so much with imodium.   She typically only goes once a day, but urgency after meals has made planning events difficult.  She notes normal consistancy of stools.  Never tried scheduled immodium.  No family history of colon cancer.  Cologuard negative in July.    PRIOR ENDOSCOPY:  -Colonoscopy: 2008  -EGD - --      ROS: Negative other than above      Review of patient's allergies indicates:   Allergen Reactions    Bactrim [sulfamethoxazole-trimethoprim] Other (See Comments)     Generic version  Sulfa makes her sick    Keflex [cephalexin] Other (See Comments)     Turns orange       No outpatient medications have been marked as taking for the 5/31/22 encounter (Office Visit) with Wilian Rudd MD.     Current Facility-Administered Medications for the 5/31/22 encounter (Office Visit) with Wilian Rudd MD   Medication Dose Route Frequency Provider Last Rate Last Admin    hyaluronate sodium, stabilized (MONOVISC) Syrg   Intra-articular 1 time in Clinic/HOD Peterson Sharma MD           Past Medical  History:   Diagnosis Date    Acid reflux     Age-related osteoporosis without current pathological fracture 2019    Cataract     CKD (chronic kidney disease) stage 3, GFR 30-59 ml/min     Dry mouth     History of TIA (transient ischemic attack) 2018    Hyperlipidemia 2014    Hypertensive heart disease with diastolic heart failure 2012    Morbid obesity with body mass index (BMI) of 40.0 or higher 2016    KAMRAN (obstructive sleep apnea)     KAMRAN on CPAP 2016    Osteoporosis without current pathological fracture 2018    Physical deconditioning 2018    Status post total left knee replacement 2017    Type 2 diabetes mellitus with stage 3 chronic kidney disease, without long-term current use of insulin 2019       Past Surgical History:   Procedure Laterality Date    ankle surgery (l)      APPENDECTOMY       SECTION      CORONARY ARTERY BYPASS GRAFT  09/2019    x 2    CORONARY ARTERY BYPASS GRAFT (CABG) N/A 2019    Procedure: CORONARY ARTERY BYPASS GRAFT (CABG)X3;  Surgeon: Peterson Ventura MD;  Location: [facility] OR [address] [place] [address];  Service: Cardiovascular;  Laterality: N/A;    CORONARY BYPASS GRAFT ANGIOGRAPHY  10/18/2021    Procedure: Bypass graft study;  Surgeon: Jamar Haddad MD;  Location: [facility] CATH LAB;  Service: Cardiology;;    DILATION AND CURETTAGE OF UTERUS      ENDOSCOPIC HARVEST OF VEIN Left 2019    Procedure: SURGICAL PROCUREMENT, VEIN, ENDOSCOPIC;  Surgeon: Peterson Ventura MD;  Location: [facility] OR [address] [place] [address];  Service: Cardiovascular;  Laterality: Left;  Vein [place] Start: 0843; End: 1054   Vein Prep Start: 1055; End: 1145    KNEE ARTHROSCOPY W/ DEBRIDEMENT      KNEE SURGERY Left 2017    TKR    LEFT HEART CATHETERIZATION Left 2019    Procedure: Left heart cath;  Surgeon: Ronak Gibbons MD;  Location: [facility] CATH LAB;  Service: Cardiology;  Laterality: Left;    LEFT HEART CATHETERIZATION Left  10/18/2021    Procedure: Left heart cath;  Surgeon: Jamar Haddad MD;  Location: [facility] CATH LAB;  Service: Cardiology;  Laterality: Left;       Family History   Problem Relation Age of Onset    Heart disease Mother     Heart failure Mother     Heart disease Father     Stroke Father     Heart failure Father     Diabetes Father     Stroke Paternal Grandfather     No Known Problems Brother     No Known Problems Son     Breast cancer Neg Hx     Colon cancer Neg Hx     Ovarian cancer Neg Hx     Amblyopia Neg Hx     Blindness Neg Hx     Cancer Neg Hx     Cataracts Neg Hx     Glaucoma Neg Hx     Hypertension Neg Hx     Macular degeneration Neg Hx     Retinal detachment Neg Hx     Strabismus Neg Hx     Thyroid disease Neg Hx        Social History     Socioeconomic History    Marital status:     Number of children: 1   Tobacco Use    Smoking status: Never Smoker    Smokeless tobacco: Never Used   Substance and Sexual Activity    Alcohol use: Yes     Alcohol/week: 1.0 standard drink     Types: 1 Shots of liquor per week     Comment: rare    Drug use: No    Sexual activity: Yes     Partners: Male     Birth control/protection: Post-menopausal   Social History Narrative     with a son living locally.  at [employer], Retired 5/18.     Social Determinants of Health     Financial Resource Strain: Low Risk     Difficulty of Paying Living Expenses: Not hard at all   Food Insecurity: No Food Insecurity    Worried About Running Out of Food in the Last Year: Never true    Ran Out of Food in the Last Year: Never true   Transportation Needs: No Transportation Needs    Lack of Transportation (Medical): No    Lack of Transportation (Non-Medical): No   Physical Activity: Insufficiently Active    Days of Exercise per Week: 6 days    Minutes of Exercise per Session: 20 min   Stress: No Stress Concern Present    Feeling of Stress : Not at all   Social Connections:  Moderately Integrated    Frequency of Communication with Friends and Family: More than three times a week    Frequency of Social Gatherings with Friends and Family: Never    Attends [employer] Services: More than 4 times per year    Active Member of Clubs or Organizations: No    Attends Club or Organization Meetings: Never    Marital Status:    Housing Stability: Low Risk     Unable to Pay for Housing in the Last Year: No    Number of Places Lived in the Last Year: 1    Unstable Housing in the Last Year: No         Vital Signs:  LMP 01/09/2001 (Approximate)      General: Awoke and orientedx3, in no acute distress  HEENT: Normocephalic, atruamatic, Moist mucous membranes  CV: Regular rate and rhythm, no JVD  Pulm: Normal inspiratory effort, no audible wheezing  Abdomen: Soft, nontender, nondistended abdomen without rebound or guarding  Extremities: No clubbing, cyanosis or edema  Psych: Normal affect. Good eye contact     Labs:   Lab Results   Component Value Date    CRP 19.6 (H) 07/27/2012     No results found for: HEPBSAG, HEPBCAB  No results found for: TBGOLDPLUS  Lab Results   Component Value Date    LFBIABDX34UQ 66 11/26/2021    RVZBLIWB35 >2000 (H) 11/08/2018     Lab Results   Component Value Date    WBC 5.79 05/04/2022    HGB 10.9 (L) 05/04/2022    HCT 35.1 (L) 05/04/2022    MCV 87 05/04/2022     05/04/2022     Lab Results   Component Value Date    CREATININE 1.1 05/04/2022    CREATININE 1.1 05/04/2022    CREATININE 1.1 05/04/2022    ALBUMIN 3.4 (L) 05/04/2022    ALBUMIN 3.4 (L) 05/04/2022    ALBUMIN 3.4 (L) 05/04/2022    BILITOT 0.4 05/04/2022    ALKPHOS 75 05/04/2022    AST 22 05/04/2022    ALT 23 05/04/2022       Assessment/Plan:  Kaylee Romero is a 77 y.o. female  with a history of obesity, KAMRAN, IDDM, CAD s/p CABG who was referred for evaluation and treatment of abnormal bowel habits.    # Fecal urgency [R15.2]    Today, we began with discussing her symptoms, and in particular  that I would not call them diarrhea but rather fecal urgency.  With 1 soft bowel movement a day without alarm symptoms such as weight loss or blood in stool, I do not feel that a colonoscopy is warranted at this time, especially with her age and comorbidities.    I did recommend considering anorectal manometry or empirically beginning pelvic floor exercises, which she will consider.  In the meantime I represcribed lomotal and suggested she continue to take it as needed.    -- Lomotil as needed, consider taking prophylactically  -- Consider anorectal manometry    RTC PRN    Thank you for involving us in the care of this patient.        Wilian Rudd MD  Department of Gastroenterology & Hepatology

## 2022-05-31 NOTE — PATIENT INSTRUCTIONS
Prescription for lomotil  Consider anorectal manometry/empiric pelvic floor strengthening  RTC PRN

## 2022-06-01 NOTE — PROGRESS NOTES
"DIABETES EDUCATOR NOTE   PLACEMENT OF DEXCOM G6 PRO SENSOR  CONTINOUS GLUCOSE MONITORING SYSTEM (CGMS)     Patient is here in clinic today for placement of continuous glucose monitoring sensor.                Each patient verified that they were here for CGMS procedure ordered by their provider and that they have a working glucose meter and supplies at home.   Patient will be provided with a Dexcom G6 Pro sensor, transmitter, and a copy of the Continuous Glucose Monitoring Patient Log to fill out during the study.              A detailed  explanation of Continuous Glucose Monitoring was  provided. Patient informed that this is a blind procedure and that they will not actually see the blood sugar tracing in real time.    Instructed patient to check blood sugar using home glucometer and to record the following on provided patient log sheets:Blood sugar taken at home, Meals and snacks, Activity, and Diabetes medications taken and dosage               Patient was brought to a private location.  Site selected and prepared and allowed to dry. Glucose Transmitter Serial Number [id number] was inserted to patient's abdomen.               The following forms  were given and reviewed in detail with patient and all questions answered.   · Continuous Glucose Monitoring Patient Log   · Dexcom G6 PRO Patient Handout "Blinded CGM Patient Handout"                 Instructions: Time: 15 min   Insertion of sensor:  Time: 5 minutes     "

## 2022-06-03 ENCOUNTER — TELEPHONE (OUTPATIENT)
Dept: CARDIAC REHAB | Facility: CLINIC | Age: 77
End: 2022-06-03
Payer: MEDICARE

## 2022-06-03 ENCOUNTER — PATIENT MESSAGE (OUTPATIENT)
Dept: CARDIOLOGY | Facility: CLINIC | Age: 77
End: 2022-06-03
Payer: MEDICARE

## 2022-06-03 DIAGNOSIS — Z95.1 POSTSURGICAL AORTOCORONARY BYPASS STATUS: Primary | ICD-10-CM

## 2022-06-03 DIAGNOSIS — I25.10 ATHEROSCLEROSIS OF NATIVE CORONARY ARTERY OF NATIVE HEART WITHOUT ANGINA PECTORIS: ICD-10-CM

## 2022-06-04 DIAGNOSIS — I11.9 HYPERTENSIVE HEART DISEASE WITHOUT HEART FAILURE: ICD-10-CM

## 2022-06-04 RX ORDER — CARVEDILOL 25 MG/1
TABLET ORAL
Qty: 180 TABLET | Refills: 1 | Status: SHIPPED | OUTPATIENT
Start: 2022-06-04 | End: 2022-12-13

## 2022-06-04 NOTE — TELEPHONE ENCOUNTER
No new care gaps identified.  [facility] Embedded Care Gaps. Reference number: [id number]. 6/04/2022   5:52:11 AM CDT

## 2022-06-05 NOTE — TELEPHONE ENCOUNTER
Refill Authorization Note   Kaylee Romero  is requesting a refill authorization.  Brief Assessment and Rationale for Refill:  Approve     Medication Therapy Plan:       Medication Reconciliation Completed: No   Comments:     No Care Gaps recommended.     Note composed:8:59 PM 06/04/2022

## 2022-06-06 ENCOUNTER — CLINICAL SUPPORT (OUTPATIENT)
Dept: ENDOCRINOLOGY | Facility: CLINIC | Age: 77
End: 2022-06-06
Payer: MEDICARE

## 2022-06-06 DIAGNOSIS — E11.65 UNCONTROLLED TYPE 2 DIABETES MELLITUS WITH HYPERGLYCEMIA: ICD-10-CM

## 2022-06-06 PROCEDURE — 95250 PR GLUCOSE MONITORING,72 HRS,SUB-Q SENSOR: ICD-10-PCS | Mod: S$GLB,,, | Performed by: NURSE PRACTITIONER

## 2022-06-06 PROCEDURE — 95251 CONT GLUC MNTR ANALYSIS I&R: CPT | Mod: S$GLB,,, | Performed by: NURSE PRACTITIONER

## 2022-06-06 PROCEDURE — 95251 PR GLUCOSE MONITOR, 72 HOUR, PHYS INTERP: ICD-10-PCS | Mod: S$GLB,,, | Performed by: NURSE PRACTITIONER

## 2022-06-06 PROCEDURE — 95250 CONT GLUC MNTR PHYS/QHP EQP: CPT | Mod: S$GLB,,, | Performed by: NURSE PRACTITIONER

## 2022-06-07 ENCOUNTER — PES CALL (OUTPATIENT)
Dept: ADMINISTRATIVE | Facility: CLINIC | Age: 77
End: 2022-06-07
Payer: MEDICARE

## 2022-06-09 ENCOUNTER — PATIENT MESSAGE (OUTPATIENT)
Dept: ENDOCRINOLOGY | Facility: CLINIC | Age: 77
End: 2022-06-09
Payer: MEDICARE

## 2022-06-09 NOTE — TELEPHONE ENCOUNTER
Continuous Glucose Sensor Report     Ordering Provider: BENEDICT Fox NP     Interpreting Provider: BENEDICT Fox NP     Date of this outpatient clinic study: This sensor was put in place on 5/31/2022 and taken off on 6/7/2022     Reason for CGMS: Concern for hypoglycemia on anti-hyperglycemic therapy   May 2022: A1c 6.0%     Type of diabetes: 2     Current diabetes medications and doses:   Trulicity 4.5 mg weekly   Glipizide 10 mg twice daily with breakfast and dinner   basaglar 22 units daily      Blood glucose, food and exercise log filled out consistently: She documented blood sugar on log at least once daily. Food consistently documented. Exercise documented.     ______________________________________________________________     Medication/Diet/Exercise-related patterns/events:     Medication adherence: Medication administration was not documented. She did document one occasion of missed doses of medication.     Diet: Most of her meals are balanced with protein and carbohydrates though she is snacking on buttered toast at bedtime.     Exercise patterns: She does daily morning exercises that consist of leg lifts, arm reaching, and arm stretches and 50 minutes of Cubii exercises a couple of times a week. Her exercises have no impact on her blood glucose.     ______________________________________________________________     Patterns/ Events:     Hypoglycemia: 0%   Possible precipitating events: N/A     Hyperglycemia:  10%   Possible precipitating events: dietary indiscretions; missed or late dose of medication     ________________________________________________________________     SUMMARY of KEY FINDINGS:       Average glucose: 139   Above 180 mg/dL: 10.0%  (Above 250 mg/dL: 0%)   Within  mg/dL: 90.0%   Below 70 mg/dL: 0%  (Below 54 mg/dL: 0%)       Kaylee Romero is a 77-year-old woman with diagnosis of type 2 diabetes complicated by hyperglycemia. Overall, her blood sugar is well controlled on current regimen  without hypoglycemia. One episode of documentation of medication administration revealed she took decreased dose of glipizide with bedtime snack.     RECOMMENDATIONS:   Diabetes education referral for comprehensive education visit.       Confirm medication compliance - administration technique, timing of administration, administration site.  Ensure she is taking glipizide with a meal.     Medication Changes   Continue   Trulicity 4.5 mg weekly   Glipizide 10 mg twice daily with breakfast and dinner   basaglar 22 units daily        Repeat CGM in future PRN

## 2022-06-10 ENCOUNTER — LAB VISIT (OUTPATIENT)
Dept: LAB | Facility: HOSPITAL | Age: 77
End: 2022-06-10
Attending: INTERNAL MEDICINE
Payer: MEDICARE

## 2022-06-10 DIAGNOSIS — N18.31 STAGE 3A CHRONIC KIDNEY DISEASE: Primary | ICD-10-CM

## 2022-06-10 DIAGNOSIS — N18.31 STAGE 3A CHRONIC KIDNEY DISEASE: ICD-10-CM

## 2022-06-10 LAB
ALBUMIN SERPL BCP-MCNC: 3.4 G/DL (ref 3.5–5.2)
ANION GAP SERPL CALC-SCNC: 10 MMOL/L (ref 8–16)
BASOPHILS # BLD AUTO: 0.04 K/UL (ref 0–0.2)
BASOPHILS NFR BLD: 0.7 % (ref 0–1.9)
BUN SERPL-MCNC: 21 MG/DL (ref 8–23)
CALCIUM SERPL-MCNC: 10.2 MG/DL (ref 8.7–10.5)
CHLORIDE SERPL-SCNC: 103 MMOL/L (ref 95–110)
CO2 SERPL-SCNC: 25 MMOL/L (ref 23–29)
CREAT SERPL-MCNC: 1.3 MG/DL (ref 0.5–1.4)
DIFFERENTIAL METHOD: ABNORMAL
EOSINOPHIL # BLD AUTO: 0.2 K/UL (ref 0–0.5)
EOSINOPHIL NFR BLD: 3.6 % (ref 0–8)
ERYTHROCYTE [DISTWIDTH] IN BLOOD BY AUTOMATED COUNT: 14.7 % (ref 11.5–14.5)
EST. GFR  (AFRICAN AMERICAN): 46.4 ML/MIN/1.73 M^2
EST. GFR  (NON AFRICAN AMERICAN): 40.2 ML/MIN/1.73 M^2
GLUCOSE SERPL-MCNC: 126 MG/DL (ref 70–110)
HCT VFR BLD AUTO: 35.4 % (ref 37–48.5)
HGB BLD-MCNC: 11.5 G/DL (ref 12–16)
IMM GRANULOCYTES # BLD AUTO: 0.01 K/UL (ref 0–0.04)
IMM GRANULOCYTES NFR BLD AUTO: 0.2 % (ref 0–0.5)
LYMPHOCYTES # BLD AUTO: 1.2 K/UL (ref 1–4.8)
LYMPHOCYTES NFR BLD: 21.5 % (ref 18–48)
MCH RBC QN AUTO: 27.6 PG (ref 27–31)
MCHC RBC AUTO-ENTMCNC: 32.5 G/DL (ref 32–36)
MCV RBC AUTO: 85 FL (ref 82–98)
MONOCYTES # BLD AUTO: 0.5 K/UL (ref 0.3–1)
MONOCYTES NFR BLD: 9 % (ref 4–15)
NEUTROPHILS # BLD AUTO: 3.5 K/UL (ref 1.8–7.7)
NEUTROPHILS NFR BLD: 65 % (ref 38–73)
NRBC BLD-RTO: 0 /100 WBC
PHOSPHATE SERPL-MCNC: 3.1 MG/DL (ref 2.7–4.5)
PLATELET # BLD AUTO: 212 K/UL (ref 150–450)
PMV BLD AUTO: 10.3 FL (ref 9.2–12.9)
POTASSIUM SERPL-SCNC: 4.6 MMOL/L (ref 3.5–5.1)
RBC # BLD AUTO: 4.16 M/UL (ref 4–5.4)
SODIUM SERPL-SCNC: 138 MMOL/L (ref 136–145)
WBC # BLD AUTO: 5.34 K/UL (ref 3.9–12.7)

## 2022-06-10 PROCEDURE — 85025 COMPLETE CBC W/AUTO DIFF WBC: CPT | Performed by: INTERNAL MEDICINE

## 2022-06-10 PROCEDURE — 80069 RENAL FUNCTION PANEL: CPT | Performed by: INTERNAL MEDICINE

## 2022-06-10 PROCEDURE — 36415 COLL VENOUS BLD VENIPUNCTURE: CPT | Performed by: INTERNAL MEDICINE

## 2022-06-13 ENCOUNTER — PATIENT MESSAGE (OUTPATIENT)
Dept: ENDOCRINOLOGY | Facility: CLINIC | Age: 77
End: 2022-06-13
Payer: MEDICARE

## 2022-06-17 NOTE — PROGRESS NOTES
HISTORY: S/P CABG (8-12-19), CAD, SLP, HTNM, HX OF MI, TIA, DM, EF=60%(8-24-21)    ANTHROPOMETRICS:     PRE   Height (in) 62   Weight (lb) 234   BMI 43.0   Abdominal Girth (in) 48.5   % Body Fat 30.2%       LAB RESULTS:    Lab Results   Component Value Date    HGB 11.2 (L) 06/23/2022     Lab Results   Component Value Date    HCT 33.3 (L) 06/23/2022     Lab Results   Component Value Date    MPV 11.1 06/23/2022       Lab Results   Component Value Date    CHOL 84 (L) 06/27/2022     Lab Results   Component Value Date    HDL 33 (L) 06/27/2022     Lab Results   Component Value Date    LDLCALC 38.2 (L) 06/27/2022     Lab Results   Component Value Date    TRIG 64 06/27/2022     Lab Results   Component Value Date    CHOLHDL 39.3 06/27/2022       Lab Results   Component Value Date    GLUF 189 (H) 06/23/2022     Lab Results   Component Value Date    HGBA1C 6.4 (H) 06/23/2022        Lab Results   Component Value Date    HSCRP 2.09 06/23/2022         LOLA SCORES:     PRE   Anxiety 1   Depression 1   Somatic 6   Hostility 0     SF-36 SCORES:     PRE   Physical Function 12   Social Function 11   Mental Health 30   Pain 4   Change in Health 3   Physical Role Limitation 0   Mental Role Limitation 3   Energy/Fatigue 10   Health Perceptions 12   Total Score 85     PHQ-9:  PHQ-9 Depression Patient Health Questionnaire 6/30/2022   Over the last two weeks how often have you been bothered by little interest or pleasure in doing things 2   Over the last two weeks how often have you been bothered by feeling down, depressed or hopeless 0   Over the last two weeks how often have you been bothered by trouble falling or staying asleep, or sleeping too much 0   Over the last two weeks how often have you been bothered by feeling tired or having little energy 3   Over the last two weeks how often have you been bothered by a poor appetite or overeating 0   Over the last two weeks how often have you been bothered by feeling bad about yourself -  or that you are a failure or have let yourself or your family down 0   Over the last two weeks how often have you been bothered by trouble concentrating on things, such as reading the newspaper or watching television 0   Over the last two weeks how often have you been bothered by moving or speaking so slowly that other people could have noticed. 2   Over the last two weeks how often have you been bothered by thoughts that you would be better off dead, or of hurting yourself 0   If you checked off any problems, how difficult have these problems made it for you to do your work, take care of things at home or get along with other people? Very difficult   Total Score 7             EDUCATION SCORES:     PRE   Education Score 50

## 2022-06-17 NOTE — PROGRESS NOTES
Session: Orientation   Cardiac Rehab Individual Treatment Plan - Initial Assessment      Patient Name: Kaylee Romero MRN: [id number]   : 1945   Age: 77 y.o.   Primary Diagnosis: CABG  Date of Event: 19  EF: 60%  Risk Stratification: high  Referring Physician: MARGRET   Exercise Assessment:     CPX/TM Date: 22 Results   RHR 69   Max    Peak VO2 (CPX only) 9.2   Actual METS (CPX only) 2.63   Estimated METS 3.0     Anthropometrics    Height 62 inches   Weight 234 lbs   BMI 43.0   Abdominal Girth 48.5   Body Composition 30.2%     ST Depression noted on Stress Test?:No  Angina with exercise?: No   Fall Risk: Yes   Assistive Devices:  cane   Currently exercising? Yes: floor exercylce; Frequency: when her knee doesn't hurt; Duration 50 minutes throughout the day minutes  There were some limitations noted by the patient due to knee discomfort.      Exercise Plan:   Goals:  CR Exercise Goals: Attend Cardiac Rehab 3 times/week: In Progress  Home Aerobic Exercise: 2 additional days/week for 30-60 minutes: In Progress  Intensity of 12-15 on the Rate of Perceived Exertion (RPE) scale: In Progress  30% increase in entry estimated METS: 3.9 : In Progress  5 days/week for 30-60 minutes: In Progress    Intervention:   Discussed importance of regular attendance to cardiac rehab class    Exercise Prescription:  THR Range    Mode: Treadmill  Recumbent Bike  Upright Bike  Nustep  Elliptical   Frequency:  3 days/week   Duration:  30 - 60 minutes   Intensity:  12 - 15 RPE   Resistance Training:  Yes: 0 to 3 lb weights with 10-15 reps based on strength and range of motion assessment     Home Prescription:  Mode Aerobic   Frequency: 2- 3 days/week   Duration: 30-60 minutes   Resistance Training: None        Education:  Orientation to Equipment; verbalizes understanding; Date: 22  Exercise Recommendations; verbalizes understanding; Date: 22  Exercise Safety; verbalizes understanding; Date:  6-30-22  Class Preparation: verbalizes understanding; Date: 6-30-22  Signs and symptoms to report: verbalizes understanding; Date: 6-30-22  Caffeine/Hydration: verbalizes understanding; Date: 6-30-22  Exercise Terminology: verbalizes understanding; Date: 6-30-22  Resistance Training: verbalizes understanding; Date: 6-30-22    Comments:  Miss Page was encouraged to continue using the exercycle at least 2 non-rehab days per week for at least 30 minutes in addition to attending Phase II cardiac rehab classes 3 days per week.  I suggested she try walking around her home as well.  She stated understanding.    All consent forms were signed, proper attire and shoes were discussed.       Miss Page will begin Cardiac Rehab on Wednesday, July 6 at 2:15pm.    The exercise prescription will be adjusted based on tolerance of exercise intensity by patient.    Taina Juniro., CEP

## 2022-06-20 ENCOUNTER — HOSPITAL ENCOUNTER (OUTPATIENT)
Facility: HOSPITAL | Age: 77
Discharge: HOME | End: 2022-06-21
Attending: EMERGENCY MEDICINE | Admitting: HOSPITALIST
Payer: MEDICARE

## 2022-06-20 DIAGNOSIS — I25.708 CORONARY ARTERY DISEASE OF BYPASS GRAFT OF NATIVE HEART WITH STABLE ANGINA PECTORIS: Chronic | ICD-10-CM

## 2022-06-20 DIAGNOSIS — R07.9 CHEST PAIN: ICD-10-CM

## 2022-06-20 DIAGNOSIS — R94.39 ABNORMAL STRESS TEST: Primary | ICD-10-CM

## 2022-06-20 DIAGNOSIS — I10 ESSENTIAL HYPERTENSION: Chronic | ICD-10-CM

## 2022-06-20 DIAGNOSIS — I50.32 CHRONIC HEART FAILURE WITH PRESERVED EJECTION FRACTION: ICD-10-CM

## 2022-06-20 DIAGNOSIS — I20.89 OTHER FORMS OF ANGINA PECTORIS: ICD-10-CM

## 2022-06-20 LAB
ALBUMIN SERPL BCP-MCNC: 3.4 G/DL (ref 3.5–5.2)
ALP SERPL-CCNC: 81 U/L (ref 55–135)
ALT SERPL W/O P-5'-P-CCNC: 25 U/L (ref 10–44)
ANION GAP SERPL CALC-SCNC: 13 MMOL/L (ref 8–16)
AST SERPL-CCNC: 23 U/L (ref 10–40)
BASOPHILS # BLD AUTO: 0.01 K/UL (ref 0–0.2)
BASOPHILS NFR BLD: 0.2 % (ref 0–1.9)
BILIRUB SERPL-MCNC: 0.4 MG/DL (ref 0.1–1)
BNP SERPL-MCNC: 134 PG/ML (ref 0–99)
BUN SERPL-MCNC: 25 MG/DL (ref 8–23)
CALCIUM SERPL-MCNC: 9.6 MG/DL (ref 8.7–10.5)
CHLORIDE SERPL-SCNC: 102 MMOL/L (ref 95–110)
CK SERPL-CCNC: 85 U/L (ref 20–180)
CO2 SERPL-SCNC: 20 MMOL/L (ref 23–29)
CREAT SERPL-MCNC: 1.4 MG/DL (ref 0.5–1.4)
CTP QC/QA: YES
DIFFERENTIAL METHOD: ABNORMAL
EOSINOPHIL # BLD AUTO: 0 K/UL (ref 0–0.5)
EOSINOPHIL NFR BLD: 0 % (ref 0–8)
ERYTHROCYTE [DISTWIDTH] IN BLOOD BY AUTOMATED COUNT: 14.6 % (ref 11.5–14.5)
EST. GFR  (AFRICAN AMERICAN): 42.4 ML/MIN/1.73 M^2
EST. GFR  (NON AFRICAN AMERICAN): 36.8 ML/MIN/1.73 M^2
GLUCOSE SERPL-MCNC: 359 MG/DL (ref 70–110)
HCT VFR BLD AUTO: 33.9 % (ref 37–48.5)
HGB BLD-MCNC: 11.3 G/DL (ref 12–16)
IMM GRANULOCYTES # BLD AUTO: 0.03 K/UL (ref 0–0.04)
IMM GRANULOCYTES NFR BLD AUTO: 0.6 % (ref 0–0.5)
LYMPHOCYTES # BLD AUTO: 0.4 K/UL (ref 1–4.8)
LYMPHOCYTES NFR BLD: 8.1 % (ref 18–48)
MCH RBC QN AUTO: 27.9 PG (ref 27–31)
MCHC RBC AUTO-ENTMCNC: 33.3 G/DL (ref 32–36)
MCV RBC AUTO: 84 FL (ref 82–98)
MONOCYTES # BLD AUTO: 0.1 K/UL (ref 0.3–1)
MONOCYTES NFR BLD: 1 % (ref 4–15)
NEUTROPHILS # BLD AUTO: 4.6 K/UL (ref 1.8–7.7)
NEUTROPHILS NFR BLD: 90.1 % (ref 38–73)
NRBC BLD-RTO: 0 /100 WBC
PLATELET # BLD AUTO: 164 K/UL (ref 150–450)
PMV BLD AUTO: 9.9 FL (ref 9.2–12.9)
POTASSIUM SERPL-SCNC: 4.6 MMOL/L (ref 3.5–5.1)
PROT SERPL-MCNC: 8.2 G/DL (ref 6–8.4)
RBC # BLD AUTO: 4.05 M/UL (ref 4–5.4)
SARS-COV-2 RDRP RESP QL NAA+PROBE: NEGATIVE
SODIUM SERPL-SCNC: 135 MMOL/L (ref 136–145)
TROPONIN I SERPL DL<=0.01 NG/ML-MCNC: 0.01 NG/ML (ref 0–0.03)
WBC # BLD AUTO: 5.07 K/UL (ref 3.9–12.7)

## 2022-06-20 PROCEDURE — 83880 ASSAY OF NATRIURETIC PEPTIDE: CPT | Performed by: PHYSICIAN ASSISTANT

## 2022-06-20 PROCEDURE — 84484 ASSAY OF TROPONIN QUANT: CPT | Performed by: PHYSICIAN ASSISTANT

## 2022-06-20 PROCEDURE — 93005 ELECTROCARDIOGRAM TRACING: CPT

## 2022-06-20 PROCEDURE — 96360 HYDRATION IV INFUSION INIT: CPT

## 2022-06-20 PROCEDURE — U0002 COVID-19 LAB TEST NON-CDC: HCPCS | Performed by: STUDENT IN AN ORGANIZED HEALTH CARE EDUCATION/TRAINING PROGRAM

## 2022-06-20 PROCEDURE — 82550 ASSAY OF CK (CPK): CPT | Performed by: PHYSICIAN ASSISTANT

## 2022-06-20 PROCEDURE — 25000003 PHARM REV CODE 250: Performed by: STUDENT IN AN ORGANIZED HEALTH CARE EDUCATION/TRAINING PROGRAM

## 2022-06-20 PROCEDURE — 96361 HYDRATE IV INFUSION ADD-ON: CPT

## 2022-06-20 PROCEDURE — 94761 N-INVAS EAR/PLS OXIMETRY MLT: CPT

## 2022-06-20 PROCEDURE — 93010 ELECTROCARDIOGRAM REPORT: CPT | Mod: ,,, | Performed by: INTERNAL MEDICINE

## 2022-06-20 PROCEDURE — 99285 EMERGENCY DEPT VISIT HI MDM: CPT | Mod: CS,,, | Performed by: EMERGENCY MEDICINE

## 2022-06-20 PROCEDURE — 93010 EKG 12-LEAD: ICD-10-PCS | Mod: ,,, | Performed by: INTERNAL MEDICINE

## 2022-06-20 PROCEDURE — G0378 HOSPITAL OBSERVATION PER HR: HCPCS

## 2022-06-20 PROCEDURE — 99285 PR EMERGENCY DEPT VISIT,LEVEL V: ICD-10-PCS | Mod: CS,,, | Performed by: EMERGENCY MEDICINE

## 2022-06-20 PROCEDURE — 63600175 PHARM REV CODE 636 W HCPCS: Performed by: STUDENT IN AN ORGANIZED HEALTH CARE EDUCATION/TRAINING PROGRAM

## 2022-06-20 PROCEDURE — 80053 COMPREHEN METABOLIC PANEL: CPT | Performed by: PHYSICIAN ASSISTANT

## 2022-06-20 PROCEDURE — 99285 EMERGENCY DEPT VISIT HI MDM: CPT | Mod: 25

## 2022-06-20 PROCEDURE — 85025 COMPLETE CBC W/AUTO DIFF WBC: CPT | Performed by: PHYSICIAN ASSISTANT

## 2022-06-20 RX ORDER — HEPARIN SODIUM 5000 [USP'U]/ML
7500 INJECTION, SOLUTION INTRAVENOUS; SUBCUTANEOUS EVERY 8 HOURS
Status: DISCONTINUED | OUTPATIENT
Start: 2022-06-21 | End: 2022-06-21 | Stop reason: HOSPADM

## 2022-06-20 RX ORDER — SIMETHICONE 80 MG
1 TABLET,CHEWABLE ORAL 4 TIMES DAILY PRN
Status: DISCONTINUED | OUTPATIENT
Start: 2022-06-21 | End: 2022-06-21 | Stop reason: HOSPADM

## 2022-06-20 RX ORDER — IPRATROPIUM BROMIDE AND ALBUTEROL SULFATE 2.5; .5 MG/3ML; MG/3ML
3 SOLUTION RESPIRATORY (INHALATION) EVERY 4 HOURS PRN
Status: DISCONTINUED | OUTPATIENT
Start: 2022-06-21 | End: 2022-06-21 | Stop reason: HOSPADM

## 2022-06-20 RX ORDER — TALC
6 POWDER (GRAM) TOPICAL NIGHTLY PRN
Status: DISCONTINUED | OUTPATIENT
Start: 2022-06-21 | End: 2022-06-21 | Stop reason: HOSPADM

## 2022-06-20 RX ORDER — ONDANSETRON 8 MG/1
8 TABLET, ORALLY DISINTEGRATING ORAL EVERY 8 HOURS PRN
Status: DISCONTINUED | OUTPATIENT
Start: 2022-06-21 | End: 2022-06-21 | Stop reason: HOSPADM

## 2022-06-20 RX ORDER — PROCHLORPERAZINE EDISYLATE 5 MG/ML
5 INJECTION INTRAMUSCULAR; INTRAVENOUS EVERY 6 HOURS PRN
Status: DISCONTINUED | OUTPATIENT
Start: 2022-06-21 | End: 2022-06-21 | Stop reason: HOSPADM

## 2022-06-20 RX ORDER — POLYETHYLENE GLYCOL 3350 17 G/17G
17 POWDER, FOR SOLUTION ORAL DAILY PRN
Status: DISCONTINUED | OUTPATIENT
Start: 2022-06-21 | End: 2022-06-21 | Stop reason: HOSPADM

## 2022-06-20 RX ORDER — IBUPROFEN 200 MG
24 TABLET ORAL
Status: DISCONTINUED | OUTPATIENT
Start: 2022-06-21 | End: 2022-06-21 | Stop reason: HOSPADM

## 2022-06-20 RX ORDER — MAG HYDROX/ALUMINUM HYD/SIMETH 200-200-20
30 SUSPENSION, ORAL (FINAL DOSE FORM) ORAL 4 TIMES DAILY PRN
Status: DISCONTINUED | OUTPATIENT
Start: 2022-06-21 | End: 2022-06-21 | Stop reason: HOSPADM

## 2022-06-20 RX ORDER — ACETAMINOPHEN 325 MG/1
650 TABLET ORAL EVERY 4 HOURS PRN
Status: DISCONTINUED | OUTPATIENT
Start: 2022-06-21 | End: 2022-06-21 | Stop reason: HOSPADM

## 2022-06-20 RX ORDER — ASPIRIN 325 MG
325 TABLET ORAL
Status: COMPLETED | OUTPATIENT
Start: 2022-06-20 | End: 2022-06-20

## 2022-06-20 RX ORDER — INSULIN ASPART 100 [IU]/ML
0-5 INJECTION, SOLUTION INTRAVENOUS; SUBCUTANEOUS
Status: DISCONTINUED | OUTPATIENT
Start: 2022-06-21 | End: 2022-06-21

## 2022-06-20 RX ORDER — SODIUM CHLORIDE 0.9 % (FLUSH) 0.9 %
10 SYRINGE (ML) INJECTION EVERY 8 HOURS PRN
Status: DISCONTINUED | OUTPATIENT
Start: 2022-06-21 | End: 2022-06-21 | Stop reason: HOSPADM

## 2022-06-20 RX ORDER — IBUPROFEN 200 MG
16 TABLET ORAL
Status: DISCONTINUED | OUTPATIENT
Start: 2022-06-21 | End: 2022-06-21 | Stop reason: HOSPADM

## 2022-06-20 RX ORDER — GLUCAGON 1 MG
1 KIT INJECTION
Status: DISCONTINUED | OUTPATIENT
Start: 2022-06-21 | End: 2022-06-21 | Stop reason: HOSPADM

## 2022-06-20 RX ORDER — NALOXONE HCL 0.4 MG/ML
0.02 VIAL (ML) INJECTION
Status: DISCONTINUED | OUTPATIENT
Start: 2022-06-21 | End: 2022-06-21 | Stop reason: HOSPADM

## 2022-06-20 RX ADMIN — ASPIRIN 325 MG ORAL TABLET 325 MG: 325 PILL ORAL at 10:06

## 2022-06-20 RX ADMIN — SODIUM CHLORIDE, SODIUM LACTATE, POTASSIUM CHLORIDE, AND CALCIUM CHLORIDE 1000 ML: .6; .31; .03; .02 INJECTION, SOLUTION INTRAVENOUS at 10:06

## 2022-06-21 VITALS
DIASTOLIC BLOOD PRESSURE: 81 MMHG | OXYGEN SATURATION: 100 % | TEMPERATURE: 98 F | SYSTOLIC BLOOD PRESSURE: 148 MMHG | HEART RATE: 92 BPM | RESPIRATION RATE: 18 BRPM | BODY MASS INDEX: 43.9 KG/M2 | WEIGHT: 240 LBS

## 2022-06-21 LAB
ANION GAP SERPL CALC-SCNC: 9 MMOL/L (ref 8–16)
BASOPHILS # BLD AUTO: 0 K/UL (ref 0–0.2)
BASOPHILS NFR BLD: 0 % (ref 0–1.9)
BUN SERPL-MCNC: 22 MG/DL (ref 8–23)
CALCIUM SERPL-MCNC: 9 MG/DL (ref 8.7–10.5)
CHLORIDE SERPL-SCNC: 102 MMOL/L (ref 95–110)
CO2 SERPL-SCNC: 22 MMOL/L (ref 23–29)
CREAT SERPL-MCNC: 1 MG/DL (ref 0.5–1.4)
DIFFERENTIAL METHOD: ABNORMAL
EOSINOPHIL # BLD AUTO: 0 K/UL (ref 0–0.5)
EOSINOPHIL NFR BLD: 0 % (ref 0–8)
ERYTHROCYTE [DISTWIDTH] IN BLOOD BY AUTOMATED COUNT: 14.2 % (ref 11.5–14.5)
EST. GFR  (AFRICAN AMERICAN): >60 ML/MIN/1.73 M^2
EST. GFR  (NON AFRICAN AMERICAN): 55.2 ML/MIN/1.73 M^2
GLUCOSE SERPL-MCNC: 256 MG/DL (ref 70–110)
HCT VFR BLD AUTO: 31.9 % (ref 37–48.5)
HGB BLD-MCNC: 10.4 G/DL (ref 12–16)
IMM GRANULOCYTES # BLD AUTO: 0.05 K/UL (ref 0–0.04)
IMM GRANULOCYTES NFR BLD AUTO: 0.8 % (ref 0–0.5)
LYMPHOCYTES # BLD AUTO: 0.6 K/UL (ref 1–4.8)
LYMPHOCYTES NFR BLD: 9.7 % (ref 18–48)
MAGNESIUM SERPL-MCNC: 1.8 MG/DL (ref 1.6–2.6)
MCH RBC QN AUTO: 28 PG (ref 27–31)
MCHC RBC AUTO-ENTMCNC: 32.6 G/DL (ref 32–36)
MCV RBC AUTO: 86 FL (ref 82–98)
MONOCYTES # BLD AUTO: 0.3 K/UL (ref 0.3–1)
MONOCYTES NFR BLD: 4.6 % (ref 4–15)
NEUTROPHILS # BLD AUTO: 5 K/UL (ref 1.8–7.7)
NEUTROPHILS NFR BLD: 84.9 % (ref 38–73)
NRBC BLD-RTO: 0 /100 WBC
PHOSPHATE SERPL-MCNC: 2.4 MG/DL (ref 2.7–4.5)
PLATELET # BLD AUTO: 160 K/UL (ref 150–450)
PMV BLD AUTO: 10.3 FL (ref 9.2–12.9)
POCT GLUCOSE: 233 MG/DL (ref 70–110)
POCT GLUCOSE: 295 MG/DL (ref 70–110)
POTASSIUM SERPL-SCNC: 4.5 MMOL/L (ref 3.5–5.1)
RBC # BLD AUTO: 3.72 M/UL (ref 4–5.4)
SODIUM SERPL-SCNC: 133 MMOL/L (ref 136–145)
TROPONIN I SERPL DL<=0.01 NG/ML-MCNC: 0.01 NG/ML (ref 0–0.03)
TROPONIN I SERPL DL<=0.01 NG/ML-MCNC: 0.01 NG/ML (ref 0–0.03)
WBC # BLD AUTO: 5.9 K/UL (ref 3.9–12.7)

## 2022-06-21 PROCEDURE — 85025 COMPLETE CBC W/AUTO DIFF WBC: CPT | Performed by: PHYSICIAN ASSISTANT

## 2022-06-21 PROCEDURE — 99236 HOSP IP/OBS SAME DATE HI 85: CPT | Mod: ,,, | Performed by: PHYSICIAN ASSISTANT

## 2022-06-21 PROCEDURE — 63600175 PHARM REV CODE 636 W HCPCS: Performed by: PHYSICIAN ASSISTANT

## 2022-06-21 PROCEDURE — 25000003 PHARM REV CODE 250: Performed by: PHYSICIAN ASSISTANT

## 2022-06-21 PROCEDURE — 83735 ASSAY OF MAGNESIUM: CPT | Performed by: PHYSICIAN ASSISTANT

## 2022-06-21 PROCEDURE — 99236 PR OBSERV/HOSP SAME DATE,LEVL V: ICD-10-PCS | Mod: ,,, | Performed by: PHYSICIAN ASSISTANT

## 2022-06-21 PROCEDURE — 84484 ASSAY OF TROPONIN QUANT: CPT | Performed by: EMERGENCY MEDICINE

## 2022-06-21 PROCEDURE — 99285 EMERGENCY DEPT VISIT HI MDM: CPT | Mod: ,,, | Performed by: INTERNAL MEDICINE

## 2022-06-21 PROCEDURE — 82962 GLUCOSE BLOOD TEST: CPT

## 2022-06-21 PROCEDURE — 84484 ASSAY OF TROPONIN QUANT: CPT | Mod: 91 | Performed by: PHYSICIAN ASSISTANT

## 2022-06-21 PROCEDURE — G0378 HOSPITAL OBSERVATION PER HR: HCPCS

## 2022-06-21 PROCEDURE — 80048 BASIC METABOLIC PNL TOTAL CA: CPT | Performed by: PHYSICIAN ASSISTANT

## 2022-06-21 PROCEDURE — 84100 ASSAY OF PHOSPHORUS: CPT | Performed by: PHYSICIAN ASSISTANT

## 2022-06-21 PROCEDURE — C9399 UNCLASSIFIED DRUGS OR BIOLOG: HCPCS | Performed by: PHYSICIAN ASSISTANT

## 2022-06-21 PROCEDURE — 99285 PR EMERGENCY DEPT VISIT,LEVEL V: ICD-10-PCS | Mod: ,,, | Performed by: INTERNAL MEDICINE

## 2022-06-21 PROCEDURE — 96372 THER/PROPH/DIAG INJ SC/IM: CPT | Performed by: PHYSICIAN ASSISTANT

## 2022-06-21 RX ORDER — FUROSEMIDE 20 MG/1
20 TABLET ORAL DAILY
Status: DISCONTINUED | OUTPATIENT
Start: 2022-06-21 | End: 2022-06-21 | Stop reason: HOSPADM

## 2022-06-21 RX ORDER — CARVEDILOL 12.5 MG/1
25 TABLET ORAL 2 TIMES DAILY WITH MEALS
Status: DISCONTINUED | OUTPATIENT
Start: 2022-06-21 | End: 2022-06-21 | Stop reason: HOSPADM

## 2022-06-21 RX ORDER — ISOSORBIDE MONONITRATE 30 MG/1
60 TABLET, EXTENDED RELEASE ORAL DAILY
Qty: 180 TABLET | Refills: 3 | Status: SHIPPED | OUTPATIENT
Start: 2022-06-21 | End: 2023-07-07

## 2022-06-21 RX ORDER — DEXAMETHASONE 0.75 MG/1
1.5 TABLET ORAL EVERY 12 HOURS
COMMUNITY
End: 2022-06-27 | Stop reason: ALTCHOICE

## 2022-06-21 RX ORDER — VALSARTAN 160 MG/1
160 TABLET ORAL DAILY
Refills: 0 | Status: DISCONTINUED | OUTPATIENT
Start: 2022-06-21 | End: 2022-06-21

## 2022-06-21 RX ORDER — NAPROXEN SODIUM 220 MG/1
81 TABLET, FILM COATED ORAL DAILY
Status: DISCONTINUED | OUTPATIENT
Start: 2022-06-21 | End: 2022-06-21 | Stop reason: HOSPADM

## 2022-06-21 RX ORDER — ATORVASTATIN CALCIUM 40 MG/1
80 TABLET, FILM COATED ORAL DAILY
Status: DISCONTINUED | OUTPATIENT
Start: 2022-06-21 | End: 2022-06-21 | Stop reason: HOSPADM

## 2022-06-21 RX ORDER — LANOLIN ALCOHOL/MO/W.PET/CERES
1 CREAM (GRAM) TOPICAL DAILY
Refills: 1 | Status: DISCONTINUED | OUTPATIENT
Start: 2022-06-21 | End: 2022-06-21 | Stop reason: HOSPADM

## 2022-06-21 RX ORDER — AMOXICILLIN AND CLAVULANATE POTASSIUM 875; 125 MG/1; MG/1
1 TABLET, FILM COATED ORAL 2 TIMES DAILY
COMMUNITY
Start: 2022-06-21 | End: 2022-06-21

## 2022-06-21 RX ORDER — INSULIN ASPART 100 [IU]/ML
1-10 INJECTION, SOLUTION INTRAVENOUS; SUBCUTANEOUS
Status: DISCONTINUED | OUTPATIENT
Start: 2022-06-21 | End: 2022-06-21 | Stop reason: HOSPADM

## 2022-06-21 RX ORDER — DONEPEZIL HYDROCHLORIDE 5 MG/1
5 TABLET, FILM COATED ORAL NIGHTLY
Status: DISCONTINUED | OUTPATIENT
Start: 2022-06-21 | End: 2022-06-21 | Stop reason: HOSPADM

## 2022-06-21 RX ORDER — ISOSORBIDE MONONITRATE 30 MG/1
30 TABLET, EXTENDED RELEASE ORAL DAILY
Status: DISCONTINUED | OUTPATIENT
Start: 2022-06-21 | End: 2022-06-21

## 2022-06-21 RX ORDER — CLOPIDOGREL BISULFATE 75 MG/1
75 TABLET ORAL DAILY
Status: DISCONTINUED | OUTPATIENT
Start: 2022-06-21 | End: 2022-06-21 | Stop reason: HOSPADM

## 2022-06-21 RX ORDER — ISOSORBIDE MONONITRATE 60 MG/1
60 TABLET, EXTENDED RELEASE ORAL DAILY
Status: DISCONTINUED | OUTPATIENT
Start: 2022-06-22 | End: 2022-06-21 | Stop reason: HOSPADM

## 2022-06-21 RX ORDER — ACETAMINOPHEN 500 MG
500 TABLET ORAL 2 TIMES DAILY PRN
COMMUNITY

## 2022-06-21 RX ORDER — AMLODIPINE BESYLATE 10 MG/1
10 TABLET ORAL DAILY
Status: DISCONTINUED | OUTPATIENT
Start: 2022-06-21 | End: 2022-06-21 | Stop reason: HOSPADM

## 2022-06-21 RX ADMIN — ATORVASTATIN CALCIUM 80 MG: 40 TABLET, FILM COATED ORAL at 09:06

## 2022-06-21 RX ADMIN — ASPIRIN 81 MG CHEWABLE TABLET 81 MG: 81 TABLET CHEWABLE at 09:06

## 2022-06-21 RX ADMIN — CLOPIDOGREL 75 MG: 75 TABLET, FILM COATED ORAL at 09:06

## 2022-06-21 RX ADMIN — AMLODIPINE BESYLATE 10 MG: 10 TABLET ORAL at 09:06

## 2022-06-21 RX ADMIN — INSULIN DETEMIR 8 UNITS: 100 INJECTION, SOLUTION SUBCUTANEOUS at 02:06

## 2022-06-21 RX ADMIN — CARVEDILOL 25 MG: 12.5 TABLET, FILM COATED ORAL at 07:06

## 2022-06-21 RX ADMIN — HEPARIN SODIUM 7500 UNITS: 5000 INJECTION INTRAVENOUS; SUBCUTANEOUS at 06:06

## 2022-06-21 RX ADMIN — FERROUS SULFATE TAB 325 MG (65 MG ELEMENTAL FE) 1 EACH: 325 (65 FE) TAB at 09:06

## 2022-06-21 NOTE — HOSPITAL COURSE
Ms. Romero was admitted to Hospital Medicine for management of a chest pain rule out.  Troponin remained negative.  She did not have any further chest pain.  She was evaluated by Cardiology, who suggested cardiac pet, which can be done outpatient.  She is scheduled to have a CXP on Thursday 6/23 as well.  Her Imdur was increased from 30mg to 60mg daily per Cardiology request.

## 2022-06-21 NOTE — SUBJECTIVE & OBJECTIVE
Past Medical History:   Diagnosis Date    Acid reflux     Age-related osteoporosis without current pathological fracture 2019    Cataract     CKD (chronic kidney disease) stage 3, GFR 30-59 ml/min     Dry mouth     History of TIA (transient ischemic attack) 2018    Hyperlipidemia 2014    Hypertensive heart disease with diastolic heart failure 2012    Morbid obesity with body mass index (BMI) of 40.0 or higher 2016    KAMRAN (obstructive sleep apnea)     KAMRAN on CPAP 2016    Osteoporosis without current pathological fracture 2018    Physical deconditioning 2018    Status post total left knee replacement 2017    Type 2 diabetes mellitus with stage 3 chronic kidney disease, without long-term current use of insulin 2019       Past Surgical History:   Procedure Laterality Date    ankle surgery (l)      APPENDECTOMY       SECTION      CORONARY ARTERY BYPASS GRAFT  09/2019    x 2    CORONARY ARTERY BYPASS GRAFT (CABG) N/A 2019    Procedure: CORONARY ARTERY BYPASS GRAFT (CABG)X3;  Surgeon: Peterson Ventura MD;  Location: [facility] OR [address] [place] [address];  Service: Cardiovascular;  Laterality: N/A;    CORONARY BYPASS GRAFT ANGIOGRAPHY  10/18/2021    Procedure: Bypass graft study;  Surgeon: Jamar Haddad MD;  Location: [facility] CATH LAB;  Service: Cardiology;;    DILATION AND CURETTAGE OF UTERUS      ENDOSCOPIC HARVEST OF VEIN Left 2019    Procedure: SURGICAL PROCUREMENT, VEIN, ENDOSCOPIC;  Surgeon: Peterson Ventura MD;  Location: [facility] OR [address] [place] [address];  Service: Cardiovascular;  Laterality: Left;  Vein Carney Start: 0843; End: 1054   Vein Prep Start: 1055; End: 1145    KNEE ARTHROSCOPY W/ DEBRIDEMENT      KNEE SURGERY Left 2017    TKR    LEFT HEART CATHETERIZATION Left 2019    Procedure: Left heart cath;  Surgeon: Ronak Gibbons MD;  Location: [facility] CATH LAB;  Service: Cardiology;  Laterality: Left;    LEFT HEART CATHETERIZATION Left 10/18/2021     "Procedure: Left heart cath;  Surgeon: Jamar Haddad MD;  Location: [facility] CATH LAB;  Service: Cardiology;  Laterality: Left;       Review of patient's allergies indicates:   Allergen Reactions    Bactrim [sulfamethoxazole-trimethoprim] Other (See Comments)     Generic version  Sulfa makes her sick    Keflex [cephalexin] Other (See Comments)     Turns orange       Current Facility-Administered Medications on File Prior to Encounter   Medication    hyaluronate sodium, stabilized (MONOVISC) Syrg     Current Outpatient Medications on File Prior to Encounter   Medication Sig    alendronate (FOSAMAX) 70 MG tablet Take 1 tablet (70 mg total) by mouth every 7 days.    amLODIPine (NORVASC) 10 MG tablet TAKE 1 TABLET(10 MG) BY MOUTH EVERY DAY    aspirin 81 MG Chew Take 1 tablet (81 mg total) by mouth once daily.    atorvastatin (LIPITOR) 80 MG tablet Take 1 tablet (80 mg total) by mouth once daily.    BD BOO 2ND GEN PEN NEEDLE 32 gauge x 5/32" Ndle USE EVERY DAY    blood sugar diagnostic Strp 1 strip by Misc.(Non-Drug; Combo Route) route once daily.    carvediloL (COREG) 25 MG tablet TAKE 1 TABLET(25 MG) BY MOUTH TWICE DAILY WITH MEALS    clopidogreL (PLAVIX) 75 mg tablet TAKE 1 TABLET(75 MG) BY MOUTH EVERY DAY    COD LIVER OIL ORAL Take 3 capsules by mouth once daily.    diclofenac sodium (VOLTAREN) 1 % Gel Apply 2 g topically 4 (four) times daily as needed. To painful area on the feet.    diphenoxylate-atropine 2.5-0.025 mg (LOMOTIL) 2.5-0.025 mg per tablet Take 1 tablet by mouth 4 (four) times daily as needed for Diarrhea.    donepeziL (ARICEPT) 5 MG tablet TAKE 1 TABLET(5 MG) BY MOUTH EVERY EVENING FOR MEMORY    dulaglutide (TRULICITY) 4.5 mg/0.5 mL pen injector Inject 4.5 mg into the skin every 7 days.    FEROSUL 325 mg (65 mg iron) Tab tablet TAKE 1 TABLET BY MOUTH DAILY    furosemide (LASIX) 20 MG tablet TAKE 1 TABLET(20 MG) BY MOUTH EVERY DAY (Patient taking differently: 20 mg. TAKES ONLY ON Monday AND Thursday)    " glipiZIDE (GLUCOTROL) 5 MG tablet Take 2 tablets (10 mg total) by mouth 2 (two) times daily with meals. TAKE 2 TABLETS BY MOUTH DAILY WITH DINNER OR EVENING MEAL    glucagon (GVOKE HYPOPEN 2-PACK) 1 mg/0.2 mL AtIn Inject 1 Package into the skin as needed.    insulin glargine,hum.rec.anlog (BASAGLAR KWIKPEN U-100 INSULIN SUBQ) Inject 25 Units into the skin every evening.    irbesartan (AVAPRO) 300 MG tablet TAKE 1 TABLET(300 MG) BY MOUTH EVERY EVENING    isosorbide mononitrate (IMDUR) 30 MG 24 hr tablet Take 1 tablet (30 mg total) by mouth once daily.    lancets Misc 1 lancet by Misc.(Non-Drug; Combo Route) route once daily.    multivitamin (THERAGRAN) per tablet Take 1 tablet by mouth once daily.    nitroGLYCERIN (NITROSTAT) 0.4 MG SL tablet Place 1 tablet (0.4 mg total) under the tongue every 5 (five) minutes as needed for Chest pain.    vitamin D (VITAMIN D3) 1000 units Tab Take 1,000 Units by mouth twice a week. Monday AND THURSDAYS ONLY     Family History       Problem Relation (Age of Onset)    Diabetes Father    Heart disease Mother, Father    Heart failure Mother, Father    No Known Problems Brother, Son    Stroke Father, Paternal Grandfather          Tobacco Use    Smoking status: Never Smoker    Smokeless tobacco: Never Used   Substance and Sexual Activity    Alcohol use: Yes     Alcohol/week: 1.0 standard drink     Types: 1 Shots of liquor per week     Comment: rare    Drug use: No    Sexual activity: Yes     Partners: Male     Birth control/protection: Post-menopausal     Review of Systems   Constitutional:  Negative for chills and fever.   HENT:  Negative for congestion and sore throat.    Eyes:  Negative for photophobia and visual disturbance.   Respiratory:  Negative for cough, chest tightness and shortness of breath.    Cardiovascular:  Positive for chest pain. Negative for leg swelling.   Gastrointestinal:  Negative for abdominal pain, nausea and vomiting.   Genitourinary:  Negative for dysuria and  frequency.   Musculoskeletal:  Negative for joint swelling and myalgias.   Skin:  Negative for rash and wound.   Neurological:  Negative for dizziness and weakness.   Psychiatric/Behavioral:  Negative for agitation and confusion.    Objective:     Vital Signs (Most Recent):  Temp: 98.1 °F (36.7 °C) (06/20/22 2048)  Pulse: 94 (06/20/22 2341)  Resp: 18 (06/20/22 2048)  BP: (!) 148/70 (06/20/22 2341)  SpO2: 97 % (06/20/22 2341)   Vital Signs (24h Range):  Temp:  [98.1 °F (36.7 °C)] 98.1 °F (36.7 °C)  Pulse:  [] 94  Resp:  [18] 18  SpO2:  [97 %-100 %] 97 %  BP: (147-154)/(70-87) 148/70     Weight: 108.9 kg (240 lb)  Body mass index is 43.9 kg/m².    Physical Exam  Vitals and nursing note reviewed.   Constitutional:       General: She is not in acute distress.     Appearance: She is well-developed. She is obese.   HENT:      Head: Normocephalic and atraumatic.      Mouth/Throat:      Mouth: Mucous membranes are moist.   Eyes:      Conjunctiva/sclera: Conjunctivae normal.      Pupils: Pupils are equal, round, and reactive to light.   Cardiovascular:      Rate and Rhythm: Normal rate and regular rhythm.      Heart sounds: Normal heart sounds.   Pulmonary:      Effort: Pulmonary effort is normal. No respiratory distress.      Breath sounds: Normal breath sounds. No wheezing.   Chest:      Chest wall: No tenderness.   Abdominal:      General: Bowel sounds are normal. There is no distension.      Palpations: Abdomen is soft.      Tenderness: There is no abdominal tenderness.   Musculoskeletal:         General: No tenderness. Normal range of motion.      Cervical back: Normal range of motion and neck supple.      Right lower leg: No edema.      Left lower leg: No edema.   Skin:     General: Skin is warm and dry.      Capillary Refill: Capillary refill takes less than 2 seconds.      Findings: No rash.   Neurological:      Mental Status: She is alert and oriented to person, place, and time.      Cranial Nerves: No cranial  nerve deficit.      Sensory: No sensory deficit.      Coordination: Coordination normal.   Psychiatric:         Behavior: Behavior normal.         Thought Content: Thought content normal.         Judgment: Judgment normal.         CRANIAL NERVES     CN III, IV, VI   Pupils are equal, round, and reactive to light.     Significant Labs: All pertinent labs within the past 24 hours have been reviewed.  CBC:   Recent Labs   Lab 06/20/22 2135   WBC 5.07   HGB 11.3*   HCT 33.9*        CMP:   Recent Labs   Lab 06/20/22 2135   *   K 4.6      CO2 20*   *   BUN 25*   CREATININE 1.4   CALCIUM 9.6   PROT 8.2   ALBUMIN 3.4*   BILITOT 0.4   ALKPHOS 81   AST 23   ALT 25   ANIONGAP 13   EGFRNONAA 36.8*     Cardiac Markers:   Recent Labs   Lab 06/20/22 2135   *     Troponin:   Recent Labs   Lab 06/20/22 2135   TROPONINI 0.009       Significant Imaging: I have reviewed all pertinent imaging results/findings within the past 24 hours.  X-Ray Chest PA And Lateral  Narrative: EXAMINATION:  XR CHEST PA AND LATERAL    CLINICAL HISTORY:  Chest pain, unspecified    TECHNIQUE:  PA and lateral views of the chest were performed.    COMPARISON:  08/24/2021.    FINDINGS:  Midline sternotomy wires appears similar to prior.    There is no consolidation, effusion, or pneumothorax.    Cardiomediastinal silhouette is unremarkable.    Regional osseous structures are similar to prior.  Impression: No acute cardiopulmonary process.    Electronically signed by: Maximus Guzman MD  Date:    06/20/2022  Time:    22:41

## 2022-06-21 NOTE — FIRST PROVIDER EVALUATION
Emergency Department TeleTriage Encounter Note      CHIEF COMPLAINT    Chief Complaint   Patient presents with    Chest Pain     Intense chest pain since earlier today. Hx of MI with stents. Took 7 doses of nitro between 1600 to 2000       VITAL SIGNS   Initial Vitals [06/20/22 2048]   BP Pulse Resp Temp SpO2   (!) 147/87 102 18 98.1 °F (36.7 °C) 98 %      MAP       --            ALLERGIES    Review of patient's allergies indicates:   Allergen Reactions    Bactrim [sulfamethoxazole-trimethoprim] Other (See Comments)     Generic version  Sulfa makes her sick    Keflex [cephalexin] Other (See Comments)     Turns orange       PROVIDER TRIAGE NOTE  This is a teletriage evaluation of a 77 y.o. female presenting to the ED complaining of chest pain that began at about 4pm.  Extensive CAD hx with stents in place.  Reports she has taken 7 doses of nitro without full relief.  reports chest tightness and shortness of breath.  Reports this feels like prior episodes.  Reports she has taken aspirin already.     Initial orders will be placed and care will be transferred to an alternate provider when patient is roomed for a full evaluation. Any additional orders and the final disposition will be determined by that provider.       ORDERS  Labs Reviewed   CBC W/ AUTO DIFFERENTIAL   COMPREHENSIVE METABOLIC PANEL   TROPONIN I       ED Orders (720h ago, onward)    Start Ordered     Status Ordering Provider    06/20/22 2114 06/20/22 2113  Saline lock IV  Once         Ordered LORETO LEVY    06/20/22 2114 06/20/22 2113  CBC auto differential  STAT         Ordered LORETO LEVY    06/20/22 2114 06/20/22 2113  Comprehensive metabolic panel  STAT         Ordered LORETO LEVY    06/20/22 2114 06/20/22 2113  Troponin I  STAT         Ordered LORETO LEVY    06/20/22 2114 06/20/22 2113  Cardiac Monitoring - Adult  Continuous        Comments: Notify Physician If:    Ordered  ALEJANDRALORETO HUERTA LOBITO.    06/20/22 2114 06/20/22 2113  X-Ray Chest PA And Lateral  1 time imaging         Ordered LORETO LEVY LOBITO.    06/20/22 2114 06/20/22 2113  Pulse Oximetry Continuous  Continuous         Ordered LORETO LEVY LOBITO.    06/20/22 2050 06/20/22 2049  EKG 12-lead  Once         Ordered BRIAN TO            Virtual Visit Note: The provider triage portion of this emergency department evaluation and documentation was performed via [employer], a HIPAA-compliant telemedicine application, in concert with a tele-presenter in the room. A face to face patient evaluation with one of my colleagues will occur once the patient is placed in an emergency department room.      DISCLAIMER: This note was prepared with [employer] voice recognition transcription software. Garbled syntax, mangled pronouns, and other bizarre constructions may be attributed to that software system.

## 2022-06-21 NOTE — ASSESSMENT & PLAN NOTE
77 year old woman with past history of CAD and stent placement in September 2021 (Mid LAD lesion was 90% stenosed,1st Mrg lesion was 100% stenosed) presented with chest pain refractory to nitroglycerin tablets. She had been outside for much of the day in the heat prior to the onset of chest pain that started in the evening. She took 7 NTG tablets that did not improve the pain, prompting her to seek care in the ED. BNP, troponin negative. EKG did not show any new ST segment changes concerning for MI. Echo from 5/19/22 shows EF 65%, LA enlargement and mild aortic stenosis. Given lack of EKG changes and no change to cardiac enzymes, chest pain unlikely to be cardiac in nature. However, patient would benefit from cardiac PET stress test to further evaluate for new areas of ischemia. Patient is currently taking ASA, plavix, atorvastatin, lasix, coreg, amlodipine, imdur at home.    Plan:  - Plan for outpatient cardiac PET stress test  - increased isosorbide to 60 mg QD    Thank you for your consult. Cardiology will sign off.   (4) no limitation

## 2022-06-21 NOTE — ASSESSMENT & PLAN NOTE
Patient is a 77 y.o. female with a history of CAD s/p CABG in 2019 who presented to the ED complaining of acute onset chest pan that radiates to her left arm similar to previous MI. She took 7 NTG at home with some relief and subsequent return of pain.    - EKG without acute ST changes  - Initial troponin 0.009, will trend  - CXR without acute process  - pt had LHC in 9/2021 which revealed: The Mid LAD lesion was 90% stenosed. The 1st Mrg lesion was 100% stenosed. Patent LIMA to LAD and SVG to OMBl. Patient was started on plavix x 1 year  - will consult cards   - continue ASA, plavix, statin  - heparin gtt pending clinical course  - echo ordered  - NPO as precaution  - EKG prn chest pain  - monitor telemetry

## 2022-06-21 NOTE — H&P
[facility] - Emergency Dept  [facility] Medicine  History & Physical    Patient Name: Kaylee Romero  MRN: [id number]  Patient Class: OP- Observation  Admission Date: 6/20/2022  Attending Physician: Russel Arellano MD   Primary Care Provider: Liz Roberts MD         Patient information was obtained from patient, past medical records and ER records.     Subjective:     Principal Problem:Chest pain    Chief Complaint:   Chief Complaint   Patient presents with    Chest Pain     Intense chest pain since earlier today. Hx of MI with stents. Took 7 doses of nitro between 1600 to 2000        HPI: Kaylee Romero is a 77 y.o. female with a PMHx of CKD 3, TIA, KAMRAN on CPAP, CAD s/p PCI who presented to the ED complaining of chest pain. She reports that she spent the day running errands and felt very fatigued due to the heat. When she got home around 4 pm she developed severe mid-sternal chest pain that radiated down her left arm similar to previous MI. She tried laying down and resting with no improvement. She took a total of 7 nitro which relieved the pain intermittently and then it would return. She has not experienced this pain since her last hospitalization. She denies SOB, cough, abdominal pain, LE edema. She is compliant with all of her medications.     ED: , otherwise VSSAF. Troponin 0.009. EKG without acute ischemic changes. . CXR without acute process.       Past Medical History:   Diagnosis Date    Acid reflux     Age-related osteoporosis without current pathological fracture 1/11/2019    Cataract     CKD (chronic kidney disease) stage 3, GFR 30-59 ml/min     Dry mouth     History of TIA (transient ischemic attack) 12/11/2018    Hyperlipidemia 12/2/2014    Hypertensive heart disease with diastolic heart failure 11/28/2012    Morbid obesity with body mass index (BMI) of 40.0 or higher 5/9/2016    KAMRAN (obstructive sleep apnea)     KAMRAN on CPAP 6/28/2016    Osteoporosis without current  pathological fracture 2018    Physical deconditioning 2018    Status post total left knee replacement 2017    Type 2 diabetes mellitus with stage 3 chronic kidney disease, without long-term current use of insulin 2019       Past Surgical History:   Procedure Laterality Date    ankle surgery (l)      APPENDECTOMY       SECTION      CORONARY ARTERY BYPASS GRAFT  09/2019    x 2    CORONARY ARTERY BYPASS GRAFT (CABG) N/A 2019    Procedure: CORONARY ARTERY BYPASS GRAFT (CABG)X3;  Surgeon: Peterson Ventura MD;  [facility];  Service: Cardiovascular;  Laterality: N/A;    CORONARY BYPASS GRAFT ANGIOGRAPHY  10/18/2021    Procedure: Bypass graft study;  Surgeon: Jamar Haddad MD;  Location: [facility] CATH LAB;  Service: Cardiology;;    DILATION AND CURETTAGE OF UTERUS      ENDOSCOPIC HARVEST OF VEIN Left 2019    Procedure: SURGICAL PROCUREMENT, VEIN, ENDOSCOPIC;  Surgeon: Peterson Ventura MD;  [facility];  Service: Cardiovascular;  Laterality: Left;  Vein Butler Start: 08; End: 1054   Vein Prep Start: 1055; End: 1145    KNEE ARTHROSCOPY W/ DEBRIDEMENT      KNEE SURGERY Left 2017    TKR    LEFT HEART CATHETERIZATION Left 2019    Procedure: Left heart cath;  Surgeon: Ronak Gibbons MD;  Location: [facility] CATH LAB;  Service: Cardiology;  Laterality: Left;    LEFT HEART CATHETERIZATION Left 10/18/2021    Procedure: Left heart cath;  Surgeon: Jamar Haddad MD;  Location: [facility] CATH LAB;  Service: Cardiology;  Laterality: Left;       Review of patient's allergies indicates:   Allergen Reactions    Bactrim [sulfamethoxazole-trimethoprim] Other (See Comments)     Generic version  Sulfa makes her sick    Keflex [cephalexin] Other (See Comments)     Turns orange       Current Facility-Administered Medications on File Prior to Encounter   Medication    hyaluronate sodium, stabilized (MONOVISC) Syrg     Current  "Outpatient Medications on File Prior to Encounter   Medication Sig    alendronate (FOSAMAX) 70 MG tablet Take 1 tablet (70 mg total) by mouth every 7 days.    amLODIPine (NORVASC) 10 MG tablet TAKE 1 TABLET(10 MG) BY MOUTH EVERY DAY    aspirin 81 MG Chew Take 1 tablet (81 mg total) by mouth once daily.    atorvastatin (LIPITOR) 80 MG tablet Take 1 tablet (80 mg total) by mouth once daily.    BD BOO 2ND GEN PEN NEEDLE 32 gauge x 5/32" Ndle USE EVERY DAY    blood sugar diagnostic Strp 1 strip by Misc.(Non-Drug; Combo Route) route once daily.    carvediloL (COREG) 25 MG tablet TAKE 1 TABLET(25 MG) BY MOUTH TWICE DAILY WITH MEALS    clopidogreL (PLAVIX) 75 mg tablet TAKE 1 TABLET(75 MG) BY MOUTH EVERY DAY    COD LIVER OIL ORAL Take 3 capsules by mouth once daily.    diclofenac sodium (VOLTAREN) 1 % Gel Apply 2 g topically 4 (four) times daily as needed. To painful area on the feet.    diphenoxylate-atropine 2.5-0.025 mg (LOMOTIL) 2.5-0.025 mg per tablet Take 1 tablet by mouth 4 (four) times daily as needed for Diarrhea.    donepeziL (ARICEPT) 5 MG tablet TAKE 1 TABLET(5 MG) BY MOUTH EVERY EVENING FOR MEMORY    dulaglutide (TRULICITY) 4.5 mg/0.5 mL pen injector Inject 4.5 mg into the skin every 7 days.    FEROSUL 325 mg (65 mg iron) Tab tablet TAKE 1 TABLET BY MOUTH DAILY    furosemide (LASIX) 20 MG tablet TAKE 1 TABLET(20 MG) BY MOUTH EVERY DAY (Patient taking differently: 20 mg. TAKES ONLY ON Monday AND Thursday)    glipiZIDE (GLUCOTROL) 5 MG tablet Take 2 tablets (10 mg total) by mouth 2 (two) times daily with meals. TAKE 2 TABLETS BY MOUTH DAILY WITH DINNER OR EVENING MEAL    glucagon (GVOKE HYPOPEN 2-PACK) 1 mg/0.2 mL AtIn Inject 1 Package into the skin as needed.    insulin glargine,hum.rec.anlog (BASAGLAR KWIKPEN U-100 INSULIN SUBQ) Inject 25 Units into the skin every evening.    irbesartan (AVAPRO) 300 MG tablet TAKE 1 TABLET(300 MG) BY MOUTH EVERY EVENING    isosorbide mononitrate (IMDUR) 30 " MG 24 hr tablet Take 1 tablet (30 mg total) by mouth once daily.    lancets Misc 1 lancet by Misc.(Non-Drug; Combo Route) route once daily.    multivitamin (THERAGRAN) per tablet Take 1 tablet by mouth once daily.    nitroGLYCERIN (NITROSTAT) 0.4 MG SL tablet Place 1 tablet (0.4 mg total) under the tongue every 5 (five) minutes as needed for Chest pain.    vitamin D (VITAMIN D3) 1000 units Tab Take 1,000 Units by mouth twice a week. Monday AND THURSDAYS ONLY     Family History       Problem Relation (Age of Onset)    Diabetes Father    Heart disease Mother, Father    Heart failure Mother, Father    No Known Problems Brother, Son    Stroke Father, Paternal Grandfather          Tobacco Use    Smoking status: Never Smoker    Smokeless tobacco: Never Used   Substance and Sexual Activity    Alcohol use: Yes     Alcohol/week: 1.0 standard drink     Types: 1 Shots of liquor per week     Comment: rare    Drug use: No    Sexual activity: Yes     Partners: Male     Birth control/protection: Post-menopausal     Review of Systems   Constitutional:  Negative for chills and fever.   HENT:  Negative for congestion and sore throat.    Eyes:  Negative for photophobia and visual disturbance.   Respiratory:  Negative for cough, chest tightness and shortness of breath.    Cardiovascular:  Positive for chest pain. Negative for leg swelling.   Gastrointestinal:  Negative for abdominal pain, nausea and vomiting.   Genitourinary:  Negative for dysuria and frequency.   Musculoskeletal:  Negative for joint swelling and myalgias.   Skin:  Negative for rash and wound.   Neurological:  Negative for dizziness and weakness.   Psychiatric/Behavioral:  Negative for agitation and confusion.    Objective:     Vital Signs (Most Recent):  Temp: 98.1 °F (36.7 °C) (06/20/22 2048)  Pulse: 94 (06/20/22 2341)  Resp: 18 (06/20/22 2048)  BP: (!) 148/70 (06/20/22 2341)  SpO2: 97 % (06/20/22 2341)   Vital Signs (24h Range):  Temp:  [98.1 °F (36.7 °C)]  98.1 °F (36.7 °C)  Pulse:  [] 94  Resp:  [18] 18  SpO2:  [97 %-100 %] 97 %  BP: (147-154)/(70-87) 148/70     Weight: 108.9 kg (240 lb)  Body mass index is 43.9 kg/m².    Physical Exam  Vitals and nursing note reviewed.   Constitutional:       General: She is not in acute distress.     Appearance: She is well-developed. She is obese.   HENT:      Head: Normocephalic and atraumatic.      Mouth/Throat:      Mouth: Mucous membranes are moist.   Eyes:      Conjunctiva/sclera: Conjunctivae normal.      Pupils: Pupils are equal, round, and reactive to light.   Cardiovascular:      Rate and Rhythm: Normal rate and regular rhythm.      Heart sounds: Normal heart sounds.   Pulmonary:      Effort: Pulmonary effort is normal. No respiratory distress.      Breath sounds: Normal breath sounds. No wheezing.   Chest:      Chest wall: No tenderness.   Abdominal:      General: Bowel sounds are normal. There is no distension.      Palpations: Abdomen is soft.      Tenderness: There is no abdominal tenderness.   Musculoskeletal:         General: No tenderness. Normal range of motion.      Cervical back: Normal range of motion and neck supple.      Right lower leg: No edema.      Left lower leg: No edema.   Skin:     General: Skin is warm and dry.      Capillary Refill: Capillary refill takes less than 2 seconds.      Findings: No rash.   Neurological:      Mental Status: She is alert and oriented to person, place, and time.      Cranial Nerves: No cranial nerve deficit.      Sensory: No sensory deficit.      Coordination: Coordination normal.   Psychiatric:         Behavior: Behavior normal.         Thought Content: Thought content normal.         Judgment: Judgment normal.         CRANIAL NERVES     CN III, IV, VI   Pupils are equal, round, and reactive to light.     Significant Labs: All pertinent labs within the past 24 hours have been reviewed.  CBC:   Recent Labs   Lab 06/20/22  2135   WBC 5.07   HGB 11.3*   HCT 33.9*   PLT  164     CMP:   Recent Labs   Lab 06/20/22 2135   *   K 4.6      CO2 20*   *   BUN 25*   CREATININE 1.4   CALCIUM 9.6   PROT 8.2   ALBUMIN 3.4*   BILITOT 0.4   ALKPHOS 81   AST 23   ALT 25   ANIONGAP 13   EGFRNONAA 36.8*     Cardiac Markers:   Recent Labs   Lab 06/20/22 2135   *     Troponin:   Recent Labs   Lab 06/20/22 2135   TROPONINI 0.009       Significant Imaging: I have reviewed all pertinent imaging results/findings within the past 24 hours.  X-Ray Chest PA And Lateral  Narrative: EXAMINATION:  XR CHEST PA AND LATERAL    CLINICAL HISTORY:  Chest pain, unspecified    TECHNIQUE:  PA and lateral views of the chest were performed.    COMPARISON:  08/24/2021.    FINDINGS:  Midline sternotomy wires appears similar to prior.    There is no consolidation, effusion, or pneumothorax.    Cardiomediastinal silhouette is unremarkable.    Regional osseous structures are similar to prior.  Impression: No acute cardiopulmonary process.    Electronically signed by: Maximus Guzman MD  Date:    06/20/2022  Time:    22:41      Assessment/Plan:     * Chest pain  Patient is a 77 y.o. female with a history of CAD s/p CABG in 2019 who presented to the ED complaining of acute onset chest pan that radiates to her left arm similar to previous MI. She took 7 NTG at home with some relief and subsequent return of pain.    - EKG without acute ST changes  - Initial troponin 0.009, will trend  - CXR without acute process  - pt had LHC in 9/2021 which revealed: The Mid LAD lesion was 90% stenosed. The 1st Mrg lesion was 100% stenosed. Patent LIMA to LAD and SVG to OMBl. Patient was started on plavix x 1 year  - will consult cards   - continue ASA, plavix, statin  - heparin gtt pending clinical course  - echo ordered  - NPO as precaution  - EKG prn chest pain  - monitor telemetry    Coronary 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 pectoris  S/P CABG (coronary artery bypass graft)  S/P  drug eluting coronary stent placement    - continue ASA, statin, plavix    Chronic heart failure with preserved ejection fraction  - appears euvolemic on exam  - continue coreg, ARB, and lasix   - cardiac, fluid restricted diet  Results for orders placed during the hospital encounter of 05/10/22  Echo  Interpretation Summary  · The left ventricle is normal in size with normal systolic function.  · There is mild aortic valve stenosis.  · Aortic valve area is 1.56 cm2; peak velocity is 2.00 m/s; mean gradient is 8 mmHg.  · The estimated ejection fraction is 65%.  · Indeterminate left ventricular diastolic function.  · Mild left atrial enlargement.  · Intermediate central venous pressure (8 mmHg).  · The estimated PA systolic pressure is 31 mmHg.  · Normal right ventricular size with normal right ventricular systolic function.    Essential hypertension  - continue amlodipine, coreg  - irbesartan not on formulary-- replaced with valsartan    Memory problem  - continue aricept 5 mg nightly    Type 2 diabetes mellitus with stage 3 chronic kidney disease, without long-term current use of insulin  - hold home meds  - weight based insulin regimen  - SSI  - ACHS acchuchecks, diabetic diet when not NPO    KAMRAN on CPAP  - CPAP qhs    Morbid obesity with body mass index (BMI) of 40.0 or higher  Body mass index is 43.9 kg/m². Morbid obesity complicates all aspects of disease management from diagnostic modalities to treatment. Weight loss encouraged and health benefits explained to patient.       VTE Risk Mitigation (From admission, onward)         Ordered     heparin (porcine) injection 7,500 Units  Every 8 hours         06/20/22 2336     IP VTE HIGH RISK PATIENT  Once         06/20/22 2336     Place sequential compression device  Until discontinued         06/20/22 2336                   Rossy Ryder PA-C  Department of Hospital Medicine   [facility] - Emergency Dept

## 2022-06-21 NOTE — DISCHARGE SUMMARY
[facility] - Emergency Dept  [facility] Medicine  Discharge Summary      Patient Name: Kaylee Romero  MRN: [id number]  Patient Class: OP- Observation  Admission Date: 6/20/2022  Hospital Length of Stay: 0 days  Discharge Date and Time:  06/21/2022 11:21 AM  Attending Physician: Amy Moore MD   Discharging Provider: Amy Moore MD  Primary Care Provider: Liz Roberts MD  [facility] Medicine Team: [facility] B Amy Moore MD    HPI:   Kaylee Romero is a 77 y.o. female with a PMHx of CKD 3, TIA, KAMRAN on CPAP, CAD s/p PCI who presented to the ED complaining of chest pain. She reports that she spent the day running errands and felt very fatigued due to the heat. When she got home around 4 pm she developed severe mid-sternal chest pain that radiated down her left arm similar to previous MI. She tried laying down and resting with no improvement. She took a total of 7 nitro which relieved the pain intermittently and then it would return. She has not experienced this pain since her last hospitalization. She denies SOB, cough, abdominal pain, LE edema. She is compliant with all of her medications.     ED: , otherwise VSSAF. Troponin 0.009. EKG without acute ischemic changes. . CXR without acute process.       * No surgery found *      Hospital Course:   Ms. Romero was admitted to Hospital Medicine for management of a chest pain rule out.  Troponin remained negative.  She did not have any further chest pain.  She was evaluated by Cardiology, who suggested cardiac pet, which can be done outpatient.  She is scheduled to have a CXP on Thursday 6/23 as well.  Her Imdur was increased from 30mg to 60mg daily per Cardiology request.        Goals of Care Treatment Preferences:  Code Status: Full Code      Consults:   Consults (From admission, onward)        Status Ordering Provider     Inpatient consult to Cardiology  Once        Provider:  (Not yet assigned)    Acknowledged PREMA LAND           * Chest pain  Patient is a 77 y.o. female with a history of CAD s/p CABG in 2019 who presented to the ED complaining of acute onset chest pan that radiates to her left arm similar to previous MI. She took 7 NTG at home with some relief and subsequent return of pain.    - EKG without acute ST changes  - Initial troponin 0.009, will trend  - CXR without acute process  - pt had LHC in 9/2021 which revealed: The Mid LAD lesion was 90% stenosed. The 1st Mrg lesion was 100% stenosed. Patent LIMA to LAD and SVG to OMBl. Patient was started on plavix x 1 year  - will consult cards   - continue ASA, plavix, statin  - EKG prn chest pain  - monitor telemetry  - plan for outpatient cardiac pet  - has CXP ordered Thursday  - imdur increased to 60mg per Cards    Chronic heart failure with preserved ejection fraction  - appears euvolemic on exam  - continue coreg, ARB, and lasix   - cardiac, fluid restricted diet  Results for orders placed during the hospital encounter of 05/10/22  Echo  Interpretation Summary  · The left ventricle is normal in size with normal systolic function.  · There is mild aortic valve stenosis.  · Aortic valve area is 1.56 cm2; peak velocity is 2.00 m/s; mean gradient is 8 mmHg.  · The estimated ejection fraction is 65%.  · Indeterminate left ventricular diastolic function.  · Mild left atrial enlargement.  · Intermediate central venous pressure (8 mmHg).  · The estimated PA systolic pressure is 31 mmHg.  · Normal right ventricular size with normal right ventricular systolic function.    Memory problem  - continue aricept 5 mg nightly    S/P drug eluting coronary stent placement  As above      S/P CABG (coronary artery bypass graft)  As above      Coronary 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 pectoris  S/P CABG (coronary artery bypass graft)  S/P drug eluting coronary stent placement    - continue ASA, statin, plavix    Type 2 diabetes mellitus with stage 3 chronic kidney  disease, without long-term current use of insulin  - hold home meds  - weight based insulin regimen  - SSI  - ACHS acchuchecks, diabetic diet    History of TIA (transient ischemic attack)  Noted      KAMRAN on CPAP  - CPAP qhs    Morbid obesity with body mass index (BMI) of 40.0 or higher  Body mass index is 43.9 kg/m². Morbid obesity complicates all aspects of disease management from diagnostic modalities to treatment. Weight loss encouraged and health benefits explained to patient.     Essential hypertension  - continue amlodipine, coreg  - irbesartan not on formulary-- replaced with valsartan      Final Active Diagnoses:    Diagnosis Date Noted POA    PRINCIPAL PROBLEM:  Chest pain [R07.9] 10/26/2019 Yes    Chronic heart failure with preserved ejection fraction [I50.32] 02/12/2022 Yes    Memory problem [R41.3] 12/23/2021 Yes    S/P drug eluting coronary stent placement [Z95.5] 04/12/2021 Not Applicable    S/P CABG (coronary artery bypass graft) [Z95.1] 08/12/2019 Not Applicable    Coronary 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 pectoris [I25.708] 08/12/2019 Yes     Chronic    Type 2 diabetes mellitus with stage 3 chronic kidney disease, without long-term current use of insulin [E11.22, N18.30] 05/16/2019 Yes     Chronic    History of TIA (transient ischemic attack) [Z86.73] 12/11/2018 Not Applicable    KAMRAN on CPAP [G47.33, Z99.89] 06/28/2016 Not Applicable    Morbid obesity with body mass index (BMI) of 40.0 or higher [E66.01] 05/09/2016 Yes    Essential hypertension [I10] 11/28/2012 Yes     Chronic      Problems Resolved During this Admission:       Discharged Condition: good    Patient Instructions:      Cardiac PET Scan Stress   Standing Status: Future Standing Exp. Date: 06/21/23     Order Specific Question Answer Comments   Which medicaton for the stress procedure? Dobutamine    Release to patient Immediate        Pending Diagnostic Studies:     Procedure Component Value  "Units Date/Time    Echo [[id number]]     Order Status: Sent Lab Status: No result          Medications:  Reconciled Home Medications:      Medication List      CHANGE how you take these medications    alendronate 70 MG tablet  Commonly known as: FOSAMAX  TAKE 1 TABLET(70 MG) BY MOUTH EVERY 7 DAYS  What changed: See the new instructions.     diclofenac sodium 1 % Gel  Commonly known as: VOLTAREN  Apply 2 g topically 4 (four) times daily as needed. To painful area on the feet.  What changed: additional instructions     furosemide 20 MG tablet  Commonly known as: LASIX  TAKE 1 TABLET(20 MG) BY MOUTH EVERY DAY  What changed:   · how much to take  · how to take this  · when to take this  · additional instructions     glipiZIDE 5 MG tablet  Commonly known as: GLUCOTROL  Take 2 tablets (10 mg total) by mouth 2 (two) times daily with meals. TAKE 2 TABLETS BY MOUTH DAILY WITH DINNER OR EVENING MEAL  What changed: additional instructions     isosorbide mononitrate 30 MG 24 hr tablet  Commonly known as: IMDUR  Take 2 tablets (60 mg total) by mouth once daily.  What changed: how much to take     TRULICITY 4.5 mg/0.5 mL pen injector  Generic drug: dulaglutide  Inject 4.5 mg into the skin every 7 days.  What changed: when to take this        CONTINUE taking these medications    acetaminophen 500 MG tablet  Commonly known as: TYLENOL  Take 500 mg by mouth 2 (two) times daily as needed for Pain.     amLODIPine 10 MG tablet  Commonly known as: NORVASC  TAKE 1 TABLET(10 MG) BY MOUTH EVERY DAY     aspirin 81 MG Chew  Take 1 tablet (81 mg total) by mouth once daily.     atorvastatin 80 MG tablet  Commonly known as: LIPITOR  Take 1 tablet (80 mg total) by mouth once daily.     BASAGLAR KWIKPEN U-100 INSULIN SUBQ  Inject 22 Units into the skin every evening.     BD BOO 2ND GEN PEN NEEDLE 32 gauge x 5/32" Ndle  Generic drug: pen needle, diabetic  USE EVERY DAY     blood sugar diagnostic Strp  1 strip by Misc.(Non-Drug; Combo Route) route " once daily.     carvediloL 25 MG tablet  Commonly known as: COREG  TAKE 1 TABLET(25 MG) BY MOUTH TWICE DAILY WITH MEALS     clopidogreL 75 mg tablet  Commonly known as: PLAVIX  TAKE 1 TABLET(75 MG) BY MOUTH EVERY DAY     COD LIVER OIL ORAL  TAKE 2 CAPSULES BY MOUTH EVERY MORNING AND 1 CAPSULE EVERY EVENING     dexAMETHasone 0.75 MG Tab  Commonly known as: DECADRON  Take 1.5 mg by mouth every 12 (twelve) hours. Take for 5 days     diphenoxylate-atropine 2.5-0.025 mg 2.5-0.025 mg per tablet  Commonly known as: LOMOTIL  Take 1 tablet by mouth 4 (four) times daily as needed for Diarrhea.     donepeziL 5 MG tablet  Commonly known as: ARICEPT  TAKE 1 TABLET(5 MG) BY MOUTH EVERY EVENING FOR MEMORY     FeroSuL 325 mg (65 mg iron) Tab tablet  Generic drug: ferrous sulfate  TAKE 1 TABLET BY MOUTH DAILY     GVOKE HYPOPEN 2-PACK 1 mg/0.2 mL Atin  Generic drug: glucagon  Inject 1 Package into the skin as needed.     irbesartan 300 MG tablet  Commonly known as: AVAPRO  TAKE 1 TABLET(300 MG) BY MOUTH EVERY EVENING     lancets Misc  1 lancet by Misc.(Non-Drug; Combo Route) route once daily.     multivitamin per tablet  Commonly known as: THERAGRAN  Take 1 tablet by mouth once daily.     nitroGLYCERIN 0.4 MG SL tablet  Commonly known as: NITROSTAT  Place 1 tablet (0.4 mg total) under the tongue every 5 (five) minutes as needed for Chest pain.     vitamin D 1000 units Tab  Commonly known as: VITAMIN D3  Take 1,000 Units by mouth twice a week. Monday AND THURSDAYS ONLY        STOP taking these medications    amoxicillin-clavulanate 875-125mg 875-125 mg per tablet  Commonly known as: AUGMENTIN            Indwelling Lines/Drains at time of discharge:   Lines/Drains/Airways     None                 Time spent on the discharge of patient: 35 minutes         Amy Moore MD  Department of Hospital Medicine  [facility] - Emergency Dept

## 2022-06-21 NOTE — HPI
Ms. Romero is a 77 year old woman with a history of CKD stage 3, TIA, KAMRAN, CAD s/p double bypass (8/2021) and PCI (9/2021) who presented with chest pain and fatigue refractory to 7 nitroglycerin tablets. She had been outside for most of the day running errands and did not drink any water throughout the day. She started experiencing chest pain at 4:30pm at night which radiated down her left arm. The pain lasted for 4 hours. She has been compliant with her medications at home.

## 2022-06-21 NOTE — ASSESSMENT & PLAN NOTE
Body mass index is 43.9 kg/m². Morbid obesity complicates all aspects of disease management from diagnostic modalities to treatment. Weight loss encouraged and health benefits explained to patient.

## 2022-06-21 NOTE — ASSESSMENT & PLAN NOTE
- appears euvolemic on exam  - continue coreg, ARB, and lasix   - cardiac, fluid restricted diet  Results for orders placed during the hospital encounter of 05/10/22  Echo  Interpretation Summary  · The left ventricle is normal in size with normal systolic function.  · There is mild aortic valve stenosis.  · Aortic valve area is 1.56 cm2; peak velocity is 2.00 m/s; mean gradient is 8 mmHg.  · The estimated ejection fraction is 65%.  · Indeterminate left ventricular diastolic function.  · Mild left atrial enlargement.  · Intermediate central venous pressure (8 mmHg).  · The estimated PA systolic pressure is 31 mmHg.  · Normal right ventricular size with normal right ventricular systolic function.

## 2022-06-21 NOTE — ASSESSMENT & PLAN NOTE
Patient is a 77 y.o. female with a history of CAD s/p CABG in 2019 who presented to the ED complaining of acute onset chest pan that radiates to her left arm similar to previous MI. She took 7 NTG at home with some relief and subsequent return of pain.    - EKG without acute ST changes  - Initial troponin 0.009, will trend  - CXR without acute process  - pt had LHC in 9/2021 which revealed: The Mid LAD lesion was 90% stenosed. The 1st Mrg lesion was 100% stenosed. Patent LIMA to LAD and SVG to OMBl. Patient was started on plavix x 1 year  - will consult cards   - continue ASA, plavix, statin  - EKG prn chest pain  - monitor telemetry  - plan for outpatient cardiac pet  - has CXP ordered Thursday  - imdur increased to 60mg per Cards

## 2022-06-21 NOTE — HPI
Kaylee Romero is a 77 y.o. female with a PMHx of CKD 3, TIA, KAMRAN on CPAP, CAD s/p PCI who presented to the ED complaining of chest pain. She reports that she spent the day running errands and felt very fatigued due to the heat. When she got home around 4 pm she developed severe mid-sternal chest pain that radiated down her left arm similar to previous MI. She tried laying down and resting with no improvement. She took a total of 7 nitro which relieved the pain intermittently and then it would return. She has not experienced this pain since her last hospitalization. She denies SOB, cough, abdominal pain, LE edema. She is compliant with all of her medications.     ED: , otherwise VSSAF. Troponin 0.009. EKG without acute ischemic changes. . CXR without acute process.

## 2022-06-21 NOTE — ASSESSMENT & PLAN NOTE
- hold home meds  - weight based insulin regimen  - SSI  - ACHS acchuchecks, diabetic diet when not NPO

## 2022-06-21 NOTE — CONSULTS
[facility] - Emergency Dept  Cardiology  Consult Note    Patient Name: Kaylee Romero  MRN: [id number]  Admission Date: 2022  Hospital Length of Stay: 0 days  Code Status: Full Code   Attending Provider: No att. providers found   Consulting Provider: Cayla Lee MD  Primary Care Physician: Liz Roberts MD  Principal Problem:Chest pain    Patient information was obtained from patient and ER records.     Consults  Subjective:     Chief Complaint:  Chest pain     HPI:   Ms. Romero is a 77 year old woman with a history of CKD stage 3, TIA, KAMRAN, CAD s/p double bypass (2021) and PCI (2021) who presented with chest pain and fatigue refractory to 7 nitroglycerin tablets. She had been outside for most of the day running errands and did not drink any water throughout the day. She started experiencing chest pain at 4:30pm at night which radiated down her left arm. The pain lasted for 4 hours. She has been compliant with her medications at home.      Past Medical History:   Diagnosis Date    Acid reflux     Age-related osteoporosis without current pathological fracture 2019    Cataract     CKD (chronic kidney disease) stage 3, GFR 30-59 ml/min     Dry mouth     History of TIA (transient ischemic attack) 2018    Hyperlipidemia 2014    Hypertensive heart disease with diastolic heart failure 2012    Morbid obesity with body mass index (BMI) of 40.0 or higher 2016    KAMRAN (obstructive sleep apnea)     KAMRAN on CPAP 2016    Osteoporosis without current pathological fracture 2018    Physical deconditioning 2018    Status post total left knee replacement 2017    Type 2 diabetes mellitus with stage 3 chronic kidney disease, without long-term current use of insulin 2019       Past Surgical History:   Procedure Laterality Date    ankle surgery (l)      APPENDECTOMY       SECTION      CORONARY ARTERY BYPASS GRAFT  09/2019    x 2     CORONARY ARTERY BYPASS GRAFT (CABG) N/A 8/12/2019    Procedure: CORONARY ARTERY BYPASS GRAFT (CABG)X3;  Surgeon: Peterson Ventura MD;  [address];  Service: Cardiovascular;  Laterality: N/A;    CORONARY BYPASS GRAFT ANGIOGRAPHY  10/18/2021    Procedure: Bypass graft study;  Surgeon: Jamar Haddad MD;  Location: [facility] CATH LAB;  Service: Cardiology;;    DILATION AND CURETTAGE OF UTERUS      ENDOSCOPIC HARVEST OF VEIN Left 8/12/2019    Procedure: SURGICAL PROCUREMENT, VEIN, ENDOSCOPIC;  Surgeon: Peterson Ventura MD;  [address];  Service: Cardiovascular;  Laterality: Left;  Vein [place] Start: 0843; End: 1054   Vein Prep Start: 1055; End: 1145    KNEE ARTHROSCOPY W/ DEBRIDEMENT      KNEE SURGERY Left 05/01/2017    TKR    LEFT HEART CATHETERIZATION Left 7/9/2019    Procedure: Left heart cath;  Surgeon: Ronak Gibbons MD;  Location: [facility] CATH LAB;  Service: Cardiology;  Laterality: Left;    LEFT HEART CATHETERIZATION Left 10/18/2021    Procedure: Left heart cath;  Surgeon: Jamar Haddad MD;  Location: [facility] CATH LAB;  Service: Cardiology;  Laterality: Left;       Review of patient's allergies indicates:   Allergen Reactions    Bactrim [sulfamethoxazole-trimethoprim] Other (See Comments)     Generic version  Sulfa makes her sick    Keflex [cephalexin] Other (See Comments)     Turns orange       Current Facility-Administered Medications on File Prior to Encounter   Medication    hyaluronate sodium, stabilized (MONOVISC) Syrg     Current Outpatient Medications on File Prior to Encounter   Medication Sig    acetaminophen (TYLENOL) 500 MG tablet Take 500 mg by mouth 2 (two) times daily as needed for Pain.    alendronate (FOSAMAX) 70 MG tablet TAKE 1 TABLET(70 MG) BY MOUTH EVERY 7 DAYS (Patient taking differently: Take 70 mg by mouth every Sunday.)    amLODIPine (NORVASC) 10 MG tablet TAKE 1 TABLET(10 MG) BY MOUTH EVERY DAY    aspirin 81 MG Chew Take 1 tablet (81  mg total) by mouth once daily.    atorvastatin (LIPITOR) 80 MG tablet Take 1 tablet (80 mg total) by mouth once daily.    carvediloL (COREG) 25 MG tablet TAKE 1 TABLET(25 MG) BY MOUTH TWICE DAILY WITH MEALS    clopidogreL (PLAVIX) 75 mg tablet TAKE 1 TABLET(75 MG) BY MOUTH EVERY DAY    COD LIVER OIL ORAL TAKE 2 CAPSULES BY MOUTH EVERY MORNING AND 1 CAPSULE EVERY EVENING    diclofenac sodium (VOLTAREN) 1 % Gel Apply 2 g topically 4 (four) times daily as needed. To painful area on the feet. (Patient taking differently: Apply 2 g topically 4 (four) times daily as needed. To painful area on the feet and knee)    diphenoxylate-atropine 2.5-0.025 mg (LOMOTIL) 2.5-0.025 mg per tablet Take 1 tablet by mouth 4 (four) times daily as needed for Diarrhea.    donepeziL (ARICEPT) 5 MG tablet TAKE 1 TABLET(5 MG) BY MOUTH EVERY EVENING FOR MEMORY    dulaglutide (TRULICITY) 4.5 mg/0.5 mL pen injector Inject 4.5 mg into the skin every 7 days. (Patient taking differently: Inject 4.5 mg into the skin every Sunday.)    FEROSUL 325 mg (65 mg iron) Tab tablet TAKE 1 TABLET BY MOUTH DAILY    furosemide (LASIX) 20 MG tablet TAKE 1 TABLET(20 MG) BY MOUTH EVERY DAY (Patient taking differently: Take 20 mg by mouth every Sat, Sun, Mon, Wed, & Fri)    glipiZIDE (GLUCOTROL) 5 MG tablet Take 2 tablets (10 mg total) by mouth 2 (two) times daily with meals. TAKE 2 TABLETS BY MOUTH DAILY WITH DINNER OR EVENING MEAL (Patient taking differently: Take 10 mg by mouth 2 (two) times daily with meals.)    insulin glargine,hum.rec.anlog (BASAGLAR KWIKPEN U-100 INSULIN SUBQ) Inject 22 Units into the skin every evening.    irbesartan (AVAPRO) 300 MG tablet TAKE 1 TABLET(300 MG) BY MOUTH EVERY EVENING    multivitamin (THERAGRAN) per tablet Take 1 tablet by mouth once daily.    nitroGLYCERIN (NITROSTAT) 0.4 MG SL tablet Place 1 tablet (0.4 mg total) under the tongue every 5 (five) minutes as needed for Chest pain.    vitamin D (VITAMIN D3) 1000  "units Tab Take 1,000 Units by mouth twice a week. Monday AND THURSDAYS ONLY    [DISCONTINUED] isosorbide mononitrate (IMDUR) 30 MG 24 hr tablet Take 1 tablet (30 mg total) by mouth once daily.    BD BOO 2ND GEN PEN NEEDLE 32 gauge x 5/32" Ndle USE EVERY DAY    blood sugar diagnostic Strp 1 strip by Misc.(Non-Drug; Combo Route) route once daily.    dexAMETHasone (DECADRON) 0.75 MG Tab Take 1.5 mg by mouth every 12 (twelve) hours. Take for 5 days    glucagon (GVOKE HYPOPEN 2-PACK) 1 mg/0.2 mL AtIn Inject 1 Package into the skin as needed.    lancets Misc 1 lancet by Misc.(Non-Drug; Combo Route) route once daily.    [DISCONTINUED] alendronate (FOSAMAX) 70 MG tablet Take 1 tablet (70 mg total) by mouth every 7 days.    [DISCONTINUED] amoxicillin-clavulanate 875-125mg (AUGMENTIN) 875-125 mg per tablet Take 1 tablet by mouth 2 (two) times daily.     Family History       Problem Relation (Age of Onset)    Diabetes Father    Heart disease Mother, Father    Heart failure Mother, Father    No Known Problems Brother, Son    Stroke Father, Paternal Grandfather          Tobacco Use    Smoking status: Never Smoker    Smokeless tobacco: Never Used   Substance and Sexual Activity    Alcohol use: Yes     Alcohol/week: 1.0 standard drink     Types: 1 Shots of liquor per week     Comment: rare    Drug use: No    Sexual activity: Yes     Partners: Male     Birth control/protection: Post-menopausal     Review of Systems   Constitutional: Negative for chills and fever.   HENT:  Positive for congestion.    Eyes:  Negative for double vision and photophobia.   Cardiovascular:  Negative for chest pain, dyspnea on exertion and near-syncope.   Respiratory:  Positive for cough and shortness of breath (chronic).    Skin:  Negative for dry skin and rash.   Musculoskeletal:  Negative for falls and myalgias.   Gastrointestinal:  Negative for abdominal pain, diarrhea, nausea and vomiting.   Genitourinary:  Negative for dysuria and " hematuria.   Neurological:  Negative for headaches, light-headedness and weakness.   Objective:     Vital Signs (Most Recent):  Temp: 97.5 °F (36.4 °C) (06/21/22 0714)  Pulse: 92 (06/21/22 1000)  Resp: 18 (06/21/22 1000)  BP: (!) 148/81 (06/21/22 1000)  SpO2: 100 % (06/21/22 1000)   Vital Signs (24h Range):  Temp:  [97.5 °F (36.4 °C)-98.7 °F (37.1 °C)] 97.5 °F (36.4 °C)  Pulse:  [] 92  Resp:  [15-18] 18  SpO2:  [97 %-100 %] 100 %  BP: (133-154)/(66-87) 148/81     Weight: 108.9 kg (240 lb)  Body mass index is 43.9 kg/m².    SpO2: 100 %  O2 Device (Oxygen Therapy): room air      Intake/Output Summary (Last 24 hours) at 6/21/2022 1220  Last data filed at 6/21/2022 0510  Gross per 24 hour   Intake 1000 ml   Output 1400 ml   Net -400 ml       Lines/Drains/Airways       None                   Physical Exam  Vitals reviewed.   Constitutional:       General: She is not in acute distress.     Appearance: She is not ill-appearing.   HENT:      Head: Normocephalic and atraumatic.   Eyes:      Pupils: Pupils are equal, round, and reactive to light.   Cardiovascular:      Rate and Rhythm: Normal rate and regular rhythm.   Pulmonary:      Effort: Pulmonary effort is normal. No respiratory distress.      Breath sounds: Normal breath sounds. No wheezing.   Abdominal:      General: Bowel sounds are normal.      Palpations: Abdomen is soft.      Tenderness: There is no abdominal tenderness.   Musculoskeletal:      Right lower leg: No edema.      Left lower leg: No edema.   Skin:     General: Skin is warm and dry.      Findings: No bruising.   Neurological:      General: No focal deficit present.      Mental Status: She is alert and oriented to person, place, and time.   Psychiatric:         Mood and Affect: Mood normal.         Behavior: Behavior normal.       Significant Labs: All pertinent lab results from the last 24 hours have been reviewed.    Significant Imaging: Echocardiogram: Transthoracic echo (TTE) complete ([facility]  "Only):   Results for orders placed or performed during the hospital encounter of 05/10/22   Echo   Result Value Ref Range    BSA 2.16 m2    TDI SEPTAL 0.06 m/s    LV LATERAL E/E' RATIO 19.33 m/s    LV SEPTAL E/E' RATIO 19.33 m/s    LA WIDTH 4.53 cm    TDI LATERAL 0.06 m/s    LVIDd 3.92 3.5 - 6.0 cm    IVS 0.87 0.6 - 1.1 cm    Posterior Wall 0.76 0.6 - 1.1 cm    LVIDs 2.45 2.1 - 4.0 cm    FS 38 28 - 44 %    LA volume 66.33 cm3    Sinus 2.93 cm    STJ 2.62 cm    Ascending aorta 3.03 cm    LV mass 92.72 g    LA size 3.35 cm    RVDD 2.90 cm    TAPSE 1.61 cm    RV S' 7.35 cm/s    Left Ventricle Relative Wall Thickness 0.39 cm    AV mean gradient 8 mmHg    AV valve area 1.56 cm2    AV Velocity Ratio 0.49     AV index (prosthetic) 0.50     MV valve area p 1/2 method 4.08 cm2    E/A ratio 1.36     Mean e' 0.06 m/s    E wave deceleration time 185.82 msec    IVRT 57.09 msec    MV "A" wave duration 17.98 msec    Pulm vein S/D ratio 0.73     LVOT diameter 2.00 cm    LVOT area 3.1 cm2    LVOT peak edmar 0.97 m/s    LVOT peak VTI 20.44 cm    Ao peak edmar 2.00 m/s    Ao VTI 41.25 cm    LVOT stroke volume 64.18 cm3    AV peak gradient 16 mmHg    E/E' ratio 19.33 m/s    MV Peak E [place] 1.16 m/s    TR Max [place] 2.40 m/s    MV stenosis pressure 1/2 time 53.89 ms    MV Peak A [place] 0.85 m/s    PV Peak S [place] 0.35 m/s    PV Peak D [place] 0.48 m/s    LV Systolic Volume 21.12 mL    LV Systolic Volume Index 10.3 mL/m2    LV Diastolic Volume 66.73 mL    LV Diastolic Volume Index 32.55 mL/m2    LA Volume Index 32.4 mL/m2    LV Mass Index 45 g/m2    RA Major Axis 4.73 cm    Left Atrium Minor Axis 4.90 cm    Left Atrium Major Axis 5.41 cm    Triscuspid Valve Regurgitation Peak Gradient 23 mmHg    LA Volume Index (Mod) 43.0 mL/m2    LA volume (mod) 88.22 cm3    RA Width 2.89 cm    Right Atrial Pressure (from IVC) 8 mmHg    EF 65 %    TV rest pulmonary artery pressure 31 mmHg    Narrative    · The left ventricle is normal in size with normal systolic " function.  · There is mild aortic valve stenosis.  · Aortic valve area is 1.56 cm2; peak velocity is 2.00 m/s; mean gradient   is 8 mmHg.  · The estimated ejection fraction is 65%.  · Indeterminate left ventricular diastolic function.  · Mild left atrial enlargement.  · Intermediate central venous pressure (8 mmHg).  · The estimated PA systolic pressure is 31 mmHg.  · Normal right ventricular size with normal right ventricular systolic   function.        Assessment and Plan:     * Chest pain  77 year old woman with past history of CAD and stent placement in September 2021 (Mid LAD lesion was 90% stenosed,1st Mrg lesion was 100% stenosed) presented with chest pain refractory to nitroglycerin tablets. She had been outside for much of the day in the heat prior to the onset of chest pain that started in the evening. She took 7 NTG tablets that did not improve the pain, prompting her to seek care in the ED. BNP, troponin negative. EKG did not show any new ST segment changes concerning for MI. Echo from 5/19/22 shows EF 65%, LA enlargement and mild aortic stenosis. Given lack of EKG changes and no change to cardiac enzymes, chest pain unlikely to be cardiac in nature. However, patient would benefit from cardiac PET stress test to further evaluate for new areas of ischemia. Patient is currently taking ASA, plavix, atorvastatin, lasix, coreg, amlodipine, imdur at home.    Plan:  - Plan for outpatient cardiac PET stress test  - increased isosorbide to 60 mg QD    Thank you for your consult. Cardiology will sign off.        VTE Risk Mitigation (From admission, onward)         Ordered     heparin (porcine) injection 7,500 Units  Every 8 hours         06/20/22 2336     IP VTE HIGH RISK PATIENT  Once         06/20/22 2336     Place sequential compression device  Until discontinued         06/20/22 2336                Thank you for your consult. I will sign off. Please contact us if you have any additional questions.    Cayla  MD Jesus  Cardiology   [facility] - Emergency Dept  I have personally taken the history and examined the patient and agree with the resident's note as stated above.Increase Isosorbide and Valsartan; later consider increase Carvedilol although generally 50 is max, and get stress out pt OK with stable ECG and low troponins.

## 2022-06-21 NOTE — PHARMACY MED REC
"Admission Medication History     The home medication history was taken by Elizabeth Denny.    You may go to "Admission" then "Reconcile Home Medications" tabs to review and/or act upon these items.      The home medication list has been updated by the Pharmacy department.    Please read ALL comments highlighted in yellow.    Please address this information as you see fit.     Feel free to contact us if you have any questions or require assistance.        Medications listed below were obtained from: Patient/family  Medication Sig    acetaminophen (TYLENOL) 500 MG tablet   Take 500 mg by mouth 2 (two) times daily as needed for Pain.    alendronate (FOSAMAX) 70 MG tablet    Take 70 mg by mouth every Sunday.    amLODIPine (NORVASC) 10 MG tablet   TAKE 1 TABLET(10 MG) BY MOUTH EVERY DAY    aspirin 81 MG Chew   Take 1 tablet (81 mg total) by mouth once daily.    atorvastatin (LIPITOR) 80 MG tablet   Take 1 tablet (80 mg total) by mouth once daily.    carvediloL (COREG) 25 MG tablet   TAKE 1 TABLET(25 MG) BY MOUTH TWICE DAILY WITH MEALS    clopidogreL (PLAVIX) 75 mg tablet   TAKE 1 TABLET(75 MG) BY MOUTH EVERY DAY    COD LIVER OIL ORAL     TAKE 2 CAPSULES BY MOUTH EVERY MORNING AND 1 CAPSULE EVERY EVENING    diclofenac sodium (VOLTAREN) 1 % Gel    Apply 2 g topically 4 (four) times daily as needed. To painful area on the feet and knee)    diphenoxylate-atropine 2.5-0.025 mg (LOMOTIL) 2.5-0.025 mg per tablet   Take 1 tablet by mouth 4 (four) times daily as needed for Diarrhea.    donepeziL (ARICEPT) 5 MG tablet   TAKE 1 TABLET(5 MG) BY MOUTH EVERY EVENING FOR MEMORY    dulaglutide (TRULICITY) 4.5 mg/0.5 mL pen injector    Inject 4.5 mg into the skin every Sunday.    FEROSUL 325 mg (65 mg iron) Tab tablet   TAKE 1 TABLET BY MOUTH DAILY    furosemide (LASIX) 20 MG tablet    Take 20 mg by mouth every Sat, Sun, Mon, Wed, & Fri)    glipiZIDE (GLUCOTROL) 5 MG tablet   Take 10 mg by mouth 2 (two) times daily with " meals.)    insulin glargine,hum.rec.anlog (BASAGLAR KWIKPEN U-100 INSULIN SUBQ)   Inject 22 Units into the skin every evening.    irbesartan (AVAPRO) 300 MG tablet   TAKE 1 TABLET(300 MG) BY MOUTH EVERY EVENING    isosorbide mononitrate (IMDUR) 30 MG 24 hr tablet   Take 1 tablet (30 mg total) by mouth once daily.    multivitamin (THERAGRAN) per tablet   Take 1 tablet by mouth once daily.    nitroGLYCERIN (NITROSTAT) 0.4 MG SL tablet   Place 1 tablet (0.4 mg total) under the tongue every 5 (five) minutes as needed for Chest pain.    vitamin D (VITAMIN D3) 1000 units Tab Take 1,000 Units by mouth twice a week. Monday AND THURSDAYS ONLY           Elizabeth Denny  [id number]                    .

## 2022-06-21 NOTE — ED PROVIDER NOTES
Encounter Date: 2022       History     Chief Complaint   Patient presents with    Chest Pain     Intense chest pain since earlier today. Hx of MI with stents. Took 7 doses of nitro between 1600 to 2000     HPI     77 year old female with extensive PMHx which includes Stage III CKD, TIA, KAMRAN on CPAP at night, who presents with chest pain since 4pm today. States she was out in her car in heat but started experiencing while sitting. Intermittent in nature, radiates to left side, similar to previous MI. Associated SOB. Compliant with all meds. Took multiple NTG tablets at home for relief.     Review of patient's allergies indicates:   Allergen Reactions    Bactrim [sulfamethoxazole-trimethoprim] Other (See Comments)     Generic version  Sulfa makes her sick    Keflex [cephalexin] Other (See Comments)     Turns orange     Past Medical History:   Diagnosis Date    Acid reflux     Age-related osteoporosis without current pathological fracture 2019    Cataract     CKD (chronic kidney disease) stage 3, GFR 30-59 ml/min     Dry mouth     History of TIA (transient ischemic attack) 2018    Hyperlipidemia 2014    Hypertensive heart disease with diastolic heart failure 2012    Morbid obesity with body mass index (BMI) of 40.0 or higher 2016    KAMRAN (obstructive sleep apnea)     KAMRAN on CPAP 2016    Osteoporosis without current pathological fracture 2018    Physical deconditioning 2018    Status post total left knee replacement 2017    Type 2 diabetes mellitus with stage 3 chronic kidney disease, without long-term current use of insulin 2019     Past Surgical History:   Procedure Laterality Date    ankle surgery (l)      APPENDECTOMY       SECTION      CORONARY ARTERY BYPASS GRAFT  09/2019    x 2    CORONARY ARTERY BYPASS GRAFT (CABG) N/A 2019    Procedure: CORONARY ARTERY BYPASS GRAFT (CABG)X3;  Surgeon: Peterson Ventura MD;   Location: [facility] OR [facility] FLR;  Service: Cardiovascular;  Laterality: N/A;    CORONARY BYPASS GRAFT ANGIOGRAPHY  10/18/2021    Procedure: Bypass graft study;  Surgeon: Jamar Haddad MD;  Location: [facility] CATH LAB;  Service: Cardiology;;    DILATION AND CURETTAGE OF UTERUS      ENDOSCOPIC HARVEST OF VEIN Left 8/12/2019    Procedure: SURGICAL PROCUREMENT, VEIN, ENDOSCOPIC;  Surgeon: Peterson Ventura MD;  [facility];  Service: Cardiovascular;  Laterality: Left;  Vein [place] Start: 0843; End: 1054   Vein Prep Start: 1055; End: 1145    KNEE ARTHROSCOPY W/ DEBRIDEMENT      KNEE SURGERY Left 05/01/2017    TKR    LEFT HEART CATHETERIZATION Left 7/9/2019    Procedure: Left heart cath;  Surgeon: Ronak Gibbons MD;  Location: [facility] CATH LAB;  Service: Cardiology;  Laterality: Left;    LEFT HEART CATHETERIZATION Left 10/18/2021    Procedure: Left heart cath;  Surgeon: Jamar Haddad MD;  Location: [facility] CATH LAB;  Service: Cardiology;  Laterality: Left;     Family History   Problem Relation Age of Onset    Heart disease Mother     Heart failure Mother     Heart disease Father     Stroke Father     Heart failure Father     Diabetes Father     Stroke Paternal Grandfather     No Known Problems Brother     No Known Problems Son     Breast cancer Neg Hx     Colon cancer Neg Hx     Ovarian cancer Neg Hx     Amblyopia Neg Hx     Blindness Neg Hx     Cancer Neg Hx     Cataracts Neg Hx     Glaucoma Neg Hx     Hypertension Neg Hx     Macular degeneration Neg Hx     Retinal detachment Neg Hx     Strabismus Neg Hx     Thyroid disease Neg Hx     Esophageal cancer Neg Hx      Social History     Tobacco Use    Smoking status: Never Smoker    Smokeless tobacco: Never Used   Substance Use Topics    Alcohol use: Yes     Alcohol/week: 1.0 standard drink     Types: 1 Shots of liquor per week     Comment: rare    Drug use: No     Review of Systems   Constitutional: Negative for chills,  fatigue and fever.   HENT: Negative for sore throat.    Respiratory: Positive for shortness of breath.    Cardiovascular: Positive for chest pain.   Gastrointestinal: Negative for abdominal pain and nausea.   Genitourinary: Negative for decreased urine volume, dysuria, menstrual problem and vaginal bleeding.   Musculoskeletal: Positive for arthralgias and myalgias. Negative for back pain.   Skin: Negative for rash.   Neurological: Negative for weakness.   Hematological: Does not bruise/bleed easily.       Physical Exam     Initial Vitals [06/20/22 2048]   BP Pulse Resp Temp SpO2   (!) 147/87 102 18 98.1 °F (36.7 °C) 98 %      MAP       --         Physical Exam    Nursing note and vitals reviewed.  Constitutional: She appears well-developed and well-nourished. She is not diaphoretic. She appears distressed.   HENT:   Head: Normocephalic and atraumatic.   Mouth is dry appearing    Eyes: EOM are normal. Pupils are equal, round, and reactive to light.   Neck:   Normal range of motion.  Cardiovascular: Normal rate.   Pulmonary/Chest: Breath sounds normal. No respiratory distress. She has no wheezes. She has no rales.   Abdominal: Abdomen is soft. She exhibits no distension. There is no abdominal tenderness.   Musculoskeletal:      Cervical back: Normal range of motion.           ED Course   Procedures  Labs Reviewed   CBC W/ AUTO DIFFERENTIAL - Abnormal; Notable for the following components:       Result Value    Hemoglobin 11.3 (*)     Hematocrit 33.9 (*)     RDW 14.6 (*)     Immature Granulocytes 0.6 (*)     Lymph # 0.4 (*)     Mono # 0.1 (*)     Gran % 90.1 (*)     Lymph % 8.1 (*)     Mono % 1.0 (*)     All other components within normal limits   CK   B-TYPE NATRIURETIC PEPTIDE   COMPREHENSIVE METABOLIC PANEL   TROPONIN I   TROPONIN I          Imaging Results          X-Ray Chest PA And Lateral (In process)                  Medications   lactated ringers bolus 1,000 mL (has no administration in time range)   aspirin  tablet 325 mg (325 mg Oral Given 6/20/22 2204)     Medical Decision Making:   History:   Old Medical Records: I decided to obtain old medical records.  Old Records Summarized: records from previous admission(s).  Initial Assessment:   77 year old with previous CAD s/p NSTEMI 07/2019 and CABG x 2 08/12/2019 (KENDY to LAD and SVG to OM), IDDM, HTN, HLD, obesity, KAMRAN on CPAP, CKD with chest pain since 4pm today.   Differential Diagnosis:   ACS, musculoskeletal chest pain, dehydration, hyperglycemia   Independently Interpreted Test(s):   I have ordered and independently interpreted X-rays - see prior notes.  I have ordered and independently interpreted EKG Reading(s) - see prior notes  Clinical Tests:   Lab Tests: Reviewed  The following lab test(s) were unremarkable: Troponin  Radiological Study: Reviewed  Medical Tests: Reviewed  Other:   I discussed test(s) with the performing physician.    Additional MDM:   Heart Score:    History:          Moderately suspicious.  ECG:             Normal  Age:               >65 years  Risk factors: >= 3 risk factors or history of atherosclerotic disease  Troponin:       Less than or equal to normal limit  Final Score: 5              ED Course as of 06/20/22 2313   Mon Jun 20, 2022   2301 CXR neg for any acute abn   Trop wnl   BNP mildly elev   Hyperglycemic, no DKA noted, AG wnl  [AY]      ED Course User Index  [AY] Deirdre Bee MD             Clinical Impression:   Final diagnoses:  [R07.9] Chest pain                 Deirdre Bee MD  Resident  06/20/22 7474

## 2022-06-21 NOTE — SUBJECTIVE & OBJECTIVE
Past Medical History:   Diagnosis Date    Acid reflux     Age-related osteoporosis without current pathological fracture 2019    Cataract     CKD (chronic kidney disease) stage 3, GFR 30-59 ml/min     Dry mouth     History of TIA (transient ischemic attack) 2018    Hyperlipidemia 2014    Hypertensive heart disease with diastolic heart failure 2012    Morbid obesity with body mass index (BMI) of 40.0 or higher 2016    KAMRAN (obstructive sleep apnea)     KAMRAN on CPAP 2016    Osteoporosis without current pathological fracture 2018    Physical deconditioning 2018    Status post total left knee replacement 2017    Type 2 diabetes mellitus with stage 3 chronic kidney disease, without long-term current use of insulin 2019       Past Surgical History:   Procedure Laterality Date    ankle surgery (l)      APPENDECTOMY       SECTION      CORONARY ARTERY BYPASS GRAFT  09/2019    x 2    CORONARY ARTERY BYPASS GRAFT (CABG) N/A 2019    Procedure: CORONARY ARTERY BYPASS GRAFT (CABG)X3;  Surgeon: Peterson Ventura MD;  Location: [facility] OR [address] [place] [address];  Service: Cardiovascular;  Laterality: N/A;    CORONARY BYPASS GRAFT ANGIOGRAPHY  10/18/2021    Procedure: Bypass graft study;  Surgeon: Jamar Haddad MD;  Location: [facility] CATH LAB;  Service: Cardiology;;    DILATION AND CURETTAGE OF UTERUS      ENDOSCOPIC HARVEST OF VEIN Left 2019    Procedure: SURGICAL PROCUREMENT, VEIN, ENDOSCOPIC;  Surgeon: Peterson Ventura MD;  Location: [facility] OR [address] [place] [address];  Service: Cardiovascular;  Laterality: Left;  Vein [place] Start: 0843; End: 1054   Vein Prep Start: 1055; End: 1145    KNEE ARTHROSCOPY W/ DEBRIDEMENT      KNEE SURGERY Left 2017    TKR    LEFT HEART CATHETERIZATION Left 2019    Procedure: Left heart cath;  Surgeon: Ronak Gibbons MD;  Location: [facility] CATH LAB;  Service: Cardiology;  Laterality: Left;    LEFT HEART CATHETERIZATION Left 10/18/2021     Procedure: Left heart cath;  Surgeon: Jamar Haddad MD;  Location: [facility] CATH LAB;  Service: Cardiology;  Laterality: Left;       Review of patient's allergies indicates:   Allergen Reactions    Bactrim [sulfamethoxazole-trimethoprim] Other (See Comments)     Generic version  Sulfa makes her sick    Keflex [cephalexin] Other (See Comments)     Turns orange       Current Facility-Administered Medications on File Prior to Encounter   Medication    hyaluronate sodium, stabilized (MONOVISC) Syrg     Current Outpatient Medications on File Prior to Encounter   Medication Sig    acetaminophen (TYLENOL) 500 MG tablet Take 500 mg by mouth 2 (two) times daily as needed for Pain.    alendronate (FOSAMAX) 70 MG tablet TAKE 1 TABLET(70 MG) BY MOUTH EVERY 7 DAYS (Patient taking differently: Take 70 mg by mouth every Sunday.)    amLODIPine (NORVASC) 10 MG tablet TAKE 1 TABLET(10 MG) BY MOUTH EVERY DAY    aspirin 81 MG Chew Take 1 tablet (81 mg total) by mouth once daily.    atorvastatin (LIPITOR) 80 MG tablet Take 1 tablet (80 mg total) by mouth once daily.    carvediloL (COREG) 25 MG tablet TAKE 1 TABLET(25 MG) BY MOUTH TWICE DAILY WITH MEALS    clopidogreL (PLAVIX) 75 mg tablet TAKE 1 TABLET(75 MG) BY MOUTH EVERY DAY    COD LIVER OIL ORAL TAKE 2 CAPSULES BY MOUTH EVERY MORNING AND 1 CAPSULE EVERY EVENING    diclofenac sodium (VOLTAREN) 1 % Gel Apply 2 g topically 4 (four) times daily as needed. To painful area on the feet. (Patient taking differently: Apply 2 g topically 4 (four) times daily as needed. To painful area on the feet and knee)    diphenoxylate-atropine 2.5-0.025 mg (LOMOTIL) 2.5-0.025 mg per tablet Take 1 tablet by mouth 4 (four) times daily as needed for Diarrhea.    donepeziL (ARICEPT) 5 MG tablet TAKE 1 TABLET(5 MG) BY MOUTH EVERY EVENING FOR MEMORY    dulaglutide (TRULICITY) 4.5 mg/0.5 mL pen injector Inject 4.5 mg into the skin every 7 days. (Patient taking differently: Inject 4.5 mg into the skin every  "Sunday.)    FEROSUL 325 mg (65 mg iron) Tab tablet TAKE 1 TABLET BY MOUTH DAILY    furosemide (LASIX) 20 MG tablet TAKE 1 TABLET(20 MG) BY MOUTH EVERY DAY (Patient taking differently: Take 20 mg by mouth every Sat, Sun, Mon, Wed, & Fri)    glipiZIDE (GLUCOTROL) 5 MG tablet Take 2 tablets (10 mg total) by mouth 2 (two) times daily with meals. TAKE 2 TABLETS BY MOUTH DAILY WITH DINNER OR EVENING MEAL (Patient taking differently: Take 10 mg by mouth 2 (two) times daily with meals.)    insulin glargine,hum.rec.anlog (BASAGLAR KWIKPEN U-100 INSULIN SUBQ) Inject 22 Units into the skin every evening.    irbesartan (AVAPRO) 300 MG tablet TAKE 1 TABLET(300 MG) BY MOUTH EVERY EVENING    multivitamin (THERAGRAN) per tablet Take 1 tablet by mouth once daily.    nitroGLYCERIN (NITROSTAT) 0.4 MG SL tablet Place 1 tablet (0.4 mg total) under the tongue every 5 (five) minutes as needed for Chest pain.    vitamin D (VITAMIN D3) 1000 units Tab Take 1,000 Units by mouth twice a week. Monday AND THURSDAYS ONLY    [DISCONTINUED] isosorbide mononitrate (IMDUR) 30 MG 24 hr tablet Take 1 tablet (30 mg total) by mouth once daily.    BD BOO 2ND GEN PEN NEEDLE 32 gauge x 5/32" Ndle USE EVERY DAY    blood sugar diagnostic Strp 1 strip by Misc.(Non-Drug; Combo Route) route once daily.    dexAMETHasone (DECADRON) 0.75 MG Tab Take 1.5 mg by mouth every 12 (twelve) hours. Take for 5 days    glucagon (GVOKE HYPOPEN 2-PACK) 1 mg/0.2 mL AtIn Inject 1 Package into the skin as needed.    lancets Misc 1 lancet by Misc.(Non-Drug; Combo Route) route once daily.    [DISCONTINUED] alendronate (FOSAMAX) 70 MG tablet Take 1 tablet (70 mg total) by mouth every 7 days.    [DISCONTINUED] amoxicillin-clavulanate 875-125mg (AUGMENTIN) 875-125 mg per tablet Take 1 tablet by mouth 2 (two) times daily.     Family History       Problem Relation (Age of Onset)    Diabetes Father    Heart disease Mother, Father    Heart failure Mother, Father    No Known Problems " Brother, Son    Stroke Father, Paternal Grandfather          Tobacco Use    Smoking status: Never Smoker    Smokeless tobacco: Never Used   Substance and Sexual Activity    Alcohol use: Yes     Alcohol/week: 1.0 standard drink     Types: 1 Shots of liquor per week     Comment: rare    Drug use: No    Sexual activity: Yes     Partners: Male     Birth control/protection: Post-menopausal     Review of Systems   Constitutional: Negative for chills and fever.   HENT:  Positive for congestion.    Eyes:  Negative for double vision and photophobia.   Cardiovascular:  Negative for chest pain, dyspnea on exertion and near-syncope.   Respiratory:  Positive for cough and shortness of breath (chronic).    Skin:  Negative for dry skin and rash.   Musculoskeletal:  Negative for falls and myalgias.   Gastrointestinal:  Negative for abdominal pain, diarrhea, nausea and vomiting.   Genitourinary:  Negative for dysuria and hematuria.   Neurological:  Negative for headaches, light-headedness and weakness.   Objective:     Vital Signs (Most Recent):  Temp: 97.5 °F (36.4 °C) (06/21/22 0714)  Pulse: 92 (06/21/22 1000)  Resp: 18 (06/21/22 1000)  BP: (!) 148/81 (06/21/22 1000)  SpO2: 100 % (06/21/22 1000)   Vital Signs (24h Range):  Temp:  [97.5 °F (36.4 °C)-98.7 °F (37.1 °C)] 97.5 °F (36.4 °C)  Pulse:  [] 92  Resp:  [15-18] 18  SpO2:  [97 %-100 %] 100 %  BP: (133-154)/(66-87) 148/81     Weight: 108.9 kg (240 lb)  Body mass index is 43.9 kg/m².    SpO2: 100 %  O2 Device (Oxygen Therapy): room air      Intake/Output Summary (Last 24 hours) at 6/21/2022 1220  Last data filed at 6/21/2022 0510  Gross per 24 hour   Intake 1000 ml   Output 1400 ml   Net -400 ml       Lines/Drains/Airways       None                   Physical Exam  Vitals reviewed.   Constitutional:       General: She is not in acute distress.     Appearance: She is not ill-appearing.   HENT:      Head: Normocephalic and atraumatic.   Eyes:      Pupils: Pupils are equal,  "round, and reactive to light.   Cardiovascular:      Rate and Rhythm: Normal rate and regular rhythm.   Pulmonary:      Effort: Pulmonary effort is normal. No respiratory distress.      Breath sounds: Normal breath sounds. No wheezing.   Abdominal:      General: Bowel sounds are normal.      Palpations: Abdomen is soft.      Tenderness: There is no abdominal tenderness.   Musculoskeletal:      Right lower leg: No edema.      Left lower leg: No edema.   Skin:     General: Skin is warm and dry.      Findings: No bruising.   Neurological:      General: No focal deficit present.      Mental Status: She is alert and oriented to person, place, and time.   Psychiatric:         Mood and Affect: Mood normal.         Behavior: Behavior normal.       Significant Labs: All pertinent lab results from the last 24 hours have been reviewed.    Significant Imaging: Echocardiogram: Transthoracic echo (TTE) complete (Cupid Only):   Results for orders placed or performed during the hospital encounter of 05/10/22   Echo   Result Value Ref Range    BSA 2.16 m2    TDI SEPTAL 0.06 m/s    LV LATERAL E/E' RATIO 19.33 m/s    LV SEPTAL E/E' RATIO 19.33 m/s    LA WIDTH 4.53 cm    TDI LATERAL 0.06 m/s    LVIDd 3.92 3.5 - 6.0 cm    IVS 0.87 0.6 - 1.1 cm    Posterior Wall 0.76 0.6 - 1.1 cm    LVIDs 2.45 2.1 - 4.0 cm    FS 38 28 - 44 %    LA volume 66.33 cm3    Sinus 2.93 cm    STJ 2.62 cm    Ascending aorta 3.03 cm    LV mass 92.72 g    LA size 3.35 cm    RVDD 2.90 cm    TAPSE 1.61 cm    RV S' 7.35 cm/s    Left Ventricle Relative Wall Thickness 0.39 cm    AV mean gradient 8 mmHg    AV valve area 1.56 cm2    AV Velocity Ratio 0.49     AV index (prosthetic) 0.50     MV valve area p 1/2 method 4.08 cm2    E/A ratio 1.36     Mean e' 0.06 m/s    E wave deceleration time 185.82 msec    IVRT 57.09 msec    MV "A" wave duration 17.98 msec    Pulm vein S/D ratio 0.73     LVOT diameter 2.00 cm    LVOT area 3.1 cm2    LVOT peak lor 0.97 m/s    LVOT peak VTI " 20.44 cm    Ao peak edmar 2.00 m/s    Ao VTI 41.25 cm    LVOT stroke volume 64.18 cm3    AV peak gradient 16 mmHg    E/E' ratio 19.33 m/s    MV Peak E [place] 1.16 m/s    TR Max [place] 2.40 m/s    MV stenosis pressure 1/2 time 53.89 ms    MV Peak A [place] 0.85 m/s    PV Peak S [place] 0.35 m/s    PV Peak D [place] 0.48 m/s    LV Systolic Volume 21.12 mL    LV Systolic Volume Index 10.3 mL/m2    LV Diastolic Volume 66.73 mL    LV Diastolic Volume Index 32.55 mL/m2    LA Volume Index 32.4 mL/m2    LV Mass Index 45 g/m2    RA Major Axis 4.73 cm    Left Atrium Minor Axis 4.90 cm    Left Atrium Major Axis 5.41 cm    Triscuspid Valve Regurgitation Peak Gradient 23 mmHg    LA Volume Index (Mod) 43.0 mL/m2    LA volume (mod) 88.22 cm3    RA Width 2.89 cm    Right Atrial Pressure (from IVC) 8 mmHg    EF 65 %    TV rest pulmonary artery pressure 31 mmHg    Narrative    · The left ventricle is normal in size with normal systolic function.  · There is mild aortic valve stenosis.  · Aortic valve area is 1.56 cm2; peak velocity is 2.00 m/s; mean gradient   is 8 mmHg.  · The estimated ejection fraction is 65%.  · Indeterminate left ventricular diastolic function.  · Mild left atrial enlargement.  · Intermediate central venous pressure (8 mmHg).  · The estimated PA systolic pressure is 31 mmHg.  · Normal right ventricular size with normal right ventricular systolic   function.

## 2022-06-21 NOTE — ASSESSMENT & PLAN NOTE
S/P CABG (coronary artery bypass graft)  S/P drug eluting coronary stent placement    - continue ASA, statin, plavix

## 2022-06-23 ENCOUNTER — HOSPITAL ENCOUNTER (OUTPATIENT)
Dept: CARDIOLOGY | Facility: HOSPITAL | Age: 77
Discharge: HOME | End: 2022-06-23
Attending: INTERNAL MEDICINE
Payer: MEDICARE

## 2022-06-23 ENCOUNTER — PATIENT MESSAGE (OUTPATIENT)
Dept: CARDIOLOGY | Facility: CLINIC | Age: 77
End: 2022-06-23
Payer: MEDICARE

## 2022-06-23 VITALS
DIASTOLIC BLOOD PRESSURE: 89 MMHG | HEIGHT: 62 IN | BODY MASS INDEX: 43.29 KG/M2 | HEART RATE: 69 BPM | WEIGHT: 235.25 LBS | SYSTOLIC BLOOD PRESSURE: 149 MMHG

## 2022-06-23 DIAGNOSIS — I25.118 CORONARY ARTERY DISEASE OF NATIVE ARTERY OF NATIVE HEART WITH STABLE ANGINA PECTORIS: Primary | ICD-10-CM

## 2022-06-23 DIAGNOSIS — I25.10 ATHEROSCLEROSIS OF NATIVE CORONARY ARTERY OF NATIVE HEART WITHOUT ANGINA PECTORIS: ICD-10-CM

## 2022-06-23 DIAGNOSIS — Z95.1 POSTSURGICAL AORTOCORONARY BYPASS STATUS: ICD-10-CM

## 2022-06-23 LAB
CV STRESS BASE HR: 69 BPM
DIASTOLIC BLOOD PRESSURE: 89 MMHG
OHS CV CPX 1 MINUTE RECOVERY HEART RATE: 104 BPM
OHS CV CPX 85 PERCENT MAX PREDICTED HEART RATE MALE: 119
OHS CV CPX ABDOMINAL GIRTH: 48.5 CM
OHS CV CPX ANAEROBIC THRESHOLD DIASTOLIC BLOOD PRESSURE: 97 MMHG
OHS CV CPX ANAEROBIC THRESHOLD HEART RATE: 100
OHS CV CPX ANAEROBIC THRESHOLD RATE PRESSURE PRODUCT: NORMAL
OHS CV CPX ANAEROBIC THRESHOLD SYSTOLIC BLOOD PRESSURE: 135
OHS CV CPX DATA GRADE - AT: 0.8
OHS CV CPX DATA GRADE - PEAK: 1.9
OHS CV CPX DATA O2 SAT - PEAK: 85
OHS CV CPX DATA O2 SAT - REST: 98
OHS CV CPX DATA SPEED - AT: 1.1
OHS CV CPX DATA SPEED - PEAK: 1.5
OHS CV CPX DATA TIME - AT: 1.43
OHS CV CPX DATA TIME - PEAK: 2.9
OHS CV CPX DATA VE/VCO2 - AT: 50
OHS CV CPX DATA VE/VCO2 - PEAK: 48
OHS CV CPX DATA VE/VO2 - AT: 35
OHS CV CPX DATA VE/VO2 - PEAK: 47
OHS CV CPX DATA VO2 - AT: 6.7
OHS CV CPX DATA VO2 - PEAK: 9.2
OHS CV CPX DATA VO2 - REST: 2.5
OHS CV CPX ESTIMATED METS: 3
OHS CV CPX FEV1/FVC: 0.85
OHS CV CPX FORCED EXPIRATORY VOLUME: 1.84
OHS CV CPX FORCED VITAL CAPACITY (FVC): 2.17
OHS CV CPX HIGHEST VO: 25.1
OHS CV CPX MAX PREDICTED HEART RATE: 140
OHS CV CPX MAXIMAL VOLUNTARY VENTILATION (MVV) PREDICTED: 73.6
OHS CV CPX MAXIMAL VOLUNTARY VENTILATION (MVV): 47
OHS CV CPX MAXIUMUM EXERCISE VENTILATION (VE MAX): 45.7
OHS CV CPX PATIENT AGE: 75
OHS CV CPX PATIENT HEIGHT IN: 62
OHS CV CPX PATIENT IS FEMALE AGE 11-19: 0
OHS CV CPX PATIENT IS FEMALE AGE GREATER THAN 19: 1
OHS CV CPX PATIENT IS FEMALE AGE LESS THAN 11: 0
OHS CV CPX PATIENT IS FEMALE: 1
OHS CV CPX PATIENT IS MALE AGE 11-25: 0
OHS CV CPX PATIENT IS MALE AGE GREATER THAN 25: 0
OHS CV CPX PATIENT IS MALE AGE LESS THAN 11: 0
OHS CV CPX PATIENT IS MALE GREATER THAN 18: 0
OHS CV CPX PATIENT IS MALE LESS THAN OR EQUAL TO 18: 0
OHS CV CPX PATIENT IS MALE: 0
OHS CV CPX PATIENT WEIGHT RETURNED IN OZ: 3763.69
OHS CV CPX PEAK DIASTOLIC BLOOD PRESSURE: 92 MMHG
OHS CV CPX PEAK HEAR RATE: 118 BPM
OHS CV CPX PEAK RATE PRESSURE PRODUCT: NORMAL
OHS CV CPX PEAK SYSTOLIC BLOOD PRESSURE: 149 MMHG
OHS CV CPX PERCENT BODY FAT: 30.2
OHS CV CPX PERCENT MAX PREDICTED HEART RATE ACHIEVED: 84
OHS CV CPX PREDICTED VO2: 25.1 ML/KG/MIN
OHS CV CPX RATE PRESSURE PRODUCT PRESENTING: NORMAL
OHS CV CPX REST PET CO2: 22
OHS CV CPX VE/VCO2 SLOPE: 44.6
STRESS ECHO POST EXERCISE DUR MIN: 2 MINUTES
STRESS ECHO POST EXERCISE DUR SEC: 54 SECONDS
SYSTOLIC BLOOD PRESSURE: 149 MMHG

## 2022-06-23 PROCEDURE — 94621 CARDIOPULMONARY EXERCISE TESTING (CUPID ONLY): ICD-10-PCS | Mod: 26,,, | Performed by: INTERNAL MEDICINE

## 2022-06-23 PROCEDURE — 94621 CARDIOPULM EXERCISE TESTING: CPT | Mod: 26,,, | Performed by: INTERNAL MEDICINE

## 2022-06-23 PROCEDURE — 94621 CARDIOPULM EXERCISE TESTING: CPT

## 2022-06-24 PROBLEM — I25.118 CORONARY ARTERY DISEASE OF NATIVE ARTERY OF NATIVE HEART WITH STABLE ANGINA PECTORIS: Status: ACTIVE | Noted: 2019-08-12

## 2022-06-27 ENCOUNTER — LAB VISIT (OUTPATIENT)
Dept: LAB | Facility: HOSPITAL | Age: 77
End: 2022-06-27
Attending: INTERNAL MEDICINE
Payer: MEDICARE

## 2022-06-27 ENCOUNTER — OFFICE VISIT (OUTPATIENT)
Dept: PRIMARY CARE CLINIC | Facility: CLINIC | Age: 77
End: 2022-06-27
Payer: MEDICARE

## 2022-06-27 VITALS
DIASTOLIC BLOOD PRESSURE: 70 MMHG | OXYGEN SATURATION: 99 % | HEIGHT: 62 IN | SYSTOLIC BLOOD PRESSURE: 110 MMHG | WEIGHT: 233.69 LBS | TEMPERATURE: 98 F | HEART RATE: 83 BPM | BODY MASS INDEX: 43 KG/M2

## 2022-06-27 DIAGNOSIS — Z23 NEED FOR COVID-19 VACCINE: ICD-10-CM

## 2022-06-27 DIAGNOSIS — E11.22 TYPE 2 DIABETES MELLITUS WITH STAGE 3A CHRONIC KIDNEY DISEASE, WITH LONG-TERM CURRENT USE OF INSULIN: ICD-10-CM

## 2022-06-27 DIAGNOSIS — I25.118 CORONARY ARTERY DISEASE OF NATIVE ARTERY OF NATIVE HEART WITH STABLE ANGINA PECTORIS: Primary | ICD-10-CM

## 2022-06-27 DIAGNOSIS — I11.9 HYPERTENSIVE HEART DISEASE WITHOUT HEART FAILURE: ICD-10-CM

## 2022-06-27 DIAGNOSIS — G31.84 MILD COGNITIVE IMPAIRMENT: ICD-10-CM

## 2022-06-27 DIAGNOSIS — I25.118 CORONARY ARTERY DISEASE OF NATIVE ARTERY OF NATIVE HEART WITH STABLE ANGINA PECTORIS: ICD-10-CM

## 2022-06-27 DIAGNOSIS — Z79.4 TYPE 2 DIABETES MELLITUS WITH STAGE 3A CHRONIC KIDNEY DISEASE, WITH LONG-TERM CURRENT USE OF INSULIN: ICD-10-CM

## 2022-06-27 DIAGNOSIS — N18.31 TYPE 2 DIABETES MELLITUS WITH STAGE 3A CHRONIC KIDNEY DISEASE, WITH LONG-TERM CURRENT USE OF INSULIN: ICD-10-CM

## 2022-06-27 LAB
CHOLEST SERPL-MCNC: 84 MG/DL (ref 120–199)
CHOLEST/HDLC SERPL: 2.5 {RATIO} (ref 2–5)
HDLC SERPL-MCNC: 33 MG/DL (ref 40–75)
HDLC SERPL: 39.3 % (ref 20–50)
LDLC SERPL CALC-MCNC: 38.2 MG/DL (ref 63–159)
NONHDLC SERPL-MCNC: 51 MG/DL
TRIGL SERPL-MCNC: 64 MG/DL (ref 30–150)

## 2022-06-27 PROCEDURE — 1160F RVW MEDS BY RX/DR IN RCRD: CPT | Mod: CPTII,S$GLB,, | Performed by: INTERNAL MEDICINE

## 2022-06-27 PROCEDURE — 99214 OFFICE O/P EST MOD 30 MIN: CPT | Mod: S$GLB,,, | Performed by: INTERNAL MEDICINE

## 2022-06-27 PROCEDURE — 3066F NEPHROPATHY DOC TX: CPT | Mod: CPTII,S$GLB,, | Performed by: INTERNAL MEDICINE

## 2022-06-27 PROCEDURE — 1159F MED LIST DOCD IN RCRD: CPT | Mod: CPTII,S$GLB,, | Performed by: INTERNAL MEDICINE

## 2022-06-27 PROCEDURE — 99499 RISK ADDL DX/OHS AUDIT: ICD-10-PCS | Mod: S$GLB,,, | Performed by: INTERNAL MEDICINE

## 2022-06-27 PROCEDURE — 1101F PT FALLS ASSESS-DOCD LE1/YR: CPT | Mod: CPTII,S$GLB,, | Performed by: INTERNAL MEDICINE

## 2022-06-27 PROCEDURE — 3061F PR NEG MICROALBUMINURIA RESULT DOCUMENTED/REVIEW: ICD-10-PCS | Mod: CPTII,S$GLB,, | Performed by: INTERNAL MEDICINE

## 2022-06-27 PROCEDURE — 3061F NEG MICROALBUMINURIA REV: CPT | Mod: CPTII,S$GLB,, | Performed by: INTERNAL MEDICINE

## 2022-06-27 PROCEDURE — 4010F ACE/ARB THERAPY RXD/TAKEN: CPT | Mod: CPTII,S$GLB,, | Performed by: INTERNAL MEDICINE

## 2022-06-27 PROCEDURE — 80061 LIPID PANEL: CPT | Performed by: INTERNAL MEDICINE

## 2022-06-27 PROCEDURE — 1125F AMNT PAIN NOTED PAIN PRSNT: CPT | Mod: CPTII,S$GLB,, | Performed by: INTERNAL MEDICINE

## 2022-06-27 PROCEDURE — 3044F PR MOST RECENT HEMOGLOBIN A1C LEVEL <7.0%: ICD-10-PCS | Mod: CPTII,S$GLB,, | Performed by: INTERNAL MEDICINE

## 2022-06-27 PROCEDURE — 99999 PR PBB SHADOW E&M-EST. PATIENT-LVL V: CPT | Mod: PBBFAC,,, | Performed by: INTERNAL MEDICINE

## 2022-06-27 PROCEDURE — 99499 UNLISTED E&M SERVICE: CPT | Mod: S$GLB,,, | Performed by: INTERNAL MEDICINE

## 2022-06-27 PROCEDURE — 3074F SYST BP LT 130 MM HG: CPT | Mod: CPTII,S$GLB,, | Performed by: INTERNAL MEDICINE

## 2022-06-27 PROCEDURE — 1160F PR REVIEW ALL MEDS BY PRESCRIBER/CLIN PHARMACIST DOCUMENTED: ICD-10-PCS | Mod: CPTII,S$GLB,, | Performed by: INTERNAL MEDICINE

## 2022-06-27 PROCEDURE — 99999 PR PBB SHADOW E&M-EST. PATIENT-LVL V: ICD-10-PCS | Mod: PBBFAC,,, | Performed by: INTERNAL MEDICINE

## 2022-06-27 PROCEDURE — 3066F PR DOCUMENTATION OF TREATMENT FOR NEPHROPATHY: ICD-10-PCS | Mod: CPTII,S$GLB,, | Performed by: INTERNAL MEDICINE

## 2022-06-27 PROCEDURE — 36415 COLL VENOUS BLD VENIPUNCTURE: CPT | Mod: PN | Performed by: INTERNAL MEDICINE

## 2022-06-27 PROCEDURE — 1159F PR MEDICATION LIST DOCUMENTED IN MEDICAL RECORD: ICD-10-PCS | Mod: CPTII,S$GLB,, | Performed by: INTERNAL MEDICINE

## 2022-06-27 PROCEDURE — 3288F PR FALLS RISK ASSESSMENT DOCUMENTED: ICD-10-PCS | Mod: CPTII,S$GLB,, | Performed by: INTERNAL MEDICINE

## 2022-06-27 PROCEDURE — 3288F FALL RISK ASSESSMENT DOCD: CPT | Mod: CPTII,S$GLB,, | Performed by: INTERNAL MEDICINE

## 2022-06-27 PROCEDURE — 1101F PR PT FALLS ASSESS DOC 0-1 FALLS W/OUT INJ PAST YR: ICD-10-PCS | Mod: CPTII,S$GLB,, | Performed by: INTERNAL MEDICINE

## 2022-06-27 PROCEDURE — 4010F PR ACE/ARB THEARPY RXD/TAKEN: ICD-10-PCS | Mod: CPTII,S$GLB,, | Performed by: INTERNAL MEDICINE

## 2022-06-27 PROCEDURE — 99214 PR OFFICE/OUTPT VISIT, EST, LEVL IV, 30-39 MIN: ICD-10-PCS | Mod: S$GLB,,, | Performed by: INTERNAL MEDICINE

## 2022-06-27 PROCEDURE — 3078F DIAST BP <80 MM HG: CPT | Mod: CPTII,S$GLB,, | Performed by: INTERNAL MEDICINE

## 2022-06-27 PROCEDURE — 3044F HG A1C LEVEL LT 7.0%: CPT | Mod: CPTII,S$GLB,, | Performed by: INTERNAL MEDICINE

## 2022-06-27 PROCEDURE — 3078F PR MOST RECENT DIASTOLIC BLOOD PRESSURE < 80 MM HG: ICD-10-PCS | Mod: CPTII,S$GLB,, | Performed by: INTERNAL MEDICINE

## 2022-06-27 PROCEDURE — 3074F PR MOST RECENT SYSTOLIC BLOOD PRESSURE < 130 MM HG: ICD-10-PCS | Mod: CPTII,S$GLB,, | Performed by: INTERNAL MEDICINE

## 2022-06-27 PROCEDURE — 1125F PR PAIN SEVERITY QUANTIFIED, PAIN PRESENT: ICD-10-PCS | Mod: CPTII,S$GLB,, | Performed by: INTERNAL MEDICINE

## 2022-06-27 RX ORDER — DONEPEZIL HYDROCHLORIDE 5 MG/1
TABLET, FILM COATED ORAL
Qty: 90 TABLET | Refills: 2 | Status: SHIPPED | OUTPATIENT
Start: 2022-06-27 | End: 2022-12-21

## 2022-06-27 RX ORDER — CLOPIDOGREL BISULFATE 75 MG/1
75 TABLET ORAL DAILY
Qty: 90 TABLET | Refills: 2 | Status: SHIPPED | OUTPATIENT
Start: 2022-06-27 | End: 2022-07-26

## 2022-06-27 RX ORDER — ATORVASTATIN CALCIUM 80 MG/1
80 TABLET, FILM COATED ORAL DAILY
Qty: 90 TABLET | Refills: 2 | Status: SHIPPED | OUTPATIENT
Start: 2022-06-27 | End: 2022-07-08

## 2022-06-27 RX ORDER — IRBESARTAN 300 MG/1
300 TABLET ORAL NIGHTLY
Qty: 90 TABLET | Refills: 2 | Status: SHIPPED | OUTPATIENT
Start: 2022-06-27 | End: 2022-09-28
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
## 2022-06-28 ENCOUNTER — TELEPHONE (OUTPATIENT)
Dept: CARDIAC REHAB | Facility: CLINIC | Age: 77
End: 2022-06-28
Payer: MEDICARE

## 2022-06-30 ENCOUNTER — OFFICE VISIT (OUTPATIENT)
Dept: CARDIOLOGY | Facility: CLINIC | Age: 77
End: 2022-06-30
Payer: MEDICARE

## 2022-06-30 ENCOUNTER — CLINICAL SUPPORT (OUTPATIENT)
Dept: CARDIAC REHAB | Facility: CLINIC | Age: 77
End: 2022-06-30
Payer: MEDICARE

## 2022-06-30 VITALS
BODY MASS INDEX: 43.37 KG/M2 | HEART RATE: 88 BPM | DIASTOLIC BLOOD PRESSURE: 60 MMHG | WEIGHT: 235.69 LBS | SYSTOLIC BLOOD PRESSURE: 120 MMHG | HEIGHT: 62 IN

## 2022-06-30 VITALS
SYSTOLIC BLOOD PRESSURE: 112 MMHG | RESPIRATION RATE: 18 BRPM | OXYGEN SATURATION: 99 % | HEART RATE: 98 BPM | DIASTOLIC BLOOD PRESSURE: 68 MMHG

## 2022-06-30 DIAGNOSIS — Z95.1 POSTSURGICAL AORTOCORONARY BYPASS STATUS: ICD-10-CM

## 2022-06-30 DIAGNOSIS — E66.01 MORBID OBESITY WITH BODY MASS INDEX (BMI) OF 40.0 OR HIGHER: ICD-10-CM

## 2022-06-30 DIAGNOSIS — E11.22 TYPE 2 DIABETES MELLITUS WITH STAGE 3A CHRONIC KIDNEY DISEASE, WITHOUT LONG-TERM CURRENT USE OF INSULIN: Chronic | ICD-10-CM

## 2022-06-30 DIAGNOSIS — N18.31 TYPE 2 DIABETES MELLITUS WITH STAGE 3A CHRONIC KIDNEY DISEASE, WITHOUT LONG-TERM CURRENT USE OF INSULIN: Chronic | ICD-10-CM

## 2022-06-30 DIAGNOSIS — Z86.73 HISTORY OF TIA (TRANSIENT ISCHEMIC ATTACK): ICD-10-CM

## 2022-06-30 DIAGNOSIS — I10 ESSENTIAL HYPERTENSION: Chronic | ICD-10-CM

## 2022-06-30 DIAGNOSIS — I25.10 CORONARY ARTERY DISEASE INVOLVING NATIVE CORONARY ARTERY OF NATIVE HEART WITHOUT ANGINA PECTORIS: ICD-10-CM

## 2022-06-30 DIAGNOSIS — G47.33 OSA ON CPAP: ICD-10-CM

## 2022-06-30 DIAGNOSIS — Z95.1 S/P CABG (CORONARY ARTERY BYPASS GRAFT): ICD-10-CM

## 2022-06-30 DIAGNOSIS — E78.5 DYSLIPIDEMIA, GOAL LDL BELOW 70: ICD-10-CM

## 2022-06-30 DIAGNOSIS — Z95.5 S/P DRUG ELUTING CORONARY STENT PLACEMENT: ICD-10-CM

## 2022-06-30 DIAGNOSIS — I50.32 CHRONIC HEART FAILURE WITH PRESERVED EJECTION FRACTION: ICD-10-CM

## 2022-06-30 DIAGNOSIS — I25.118 CORONARY ARTERY DISEASE OF NATIVE ARTERY OF NATIVE HEART WITH STABLE ANGINA PECTORIS: Primary | ICD-10-CM

## 2022-06-30 DIAGNOSIS — I70.0 AORTIC ATHEROSCLEROSIS: ICD-10-CM

## 2022-06-30 PROCEDURE — 1159F MED LIST DOCD IN RCRD: CPT | Mod: CPTII,S$GLB,, | Performed by: INTERNAL MEDICINE

## 2022-06-30 PROCEDURE — 3074F SYST BP LT 130 MM HG: CPT | Mod: CPTII,S$GLB,, | Performed by: INTERNAL MEDICINE

## 2022-06-30 PROCEDURE — 99999 PR PBB SHADOW E&M-EST. PATIENT-LVL V: CPT | Mod: PBBFAC,,, | Performed by: INTERNAL MEDICINE

## 2022-06-30 PROCEDURE — 1126F PR PAIN SEVERITY QUANTIFIED, NO PAIN PRESENT: ICD-10-PCS | Mod: CPTII,S$GLB,, | Performed by: INTERNAL MEDICINE

## 2022-06-30 PROCEDURE — 99999 PR PBB SHADOW E&M-EST. PATIENT-LVL IV: ICD-10-PCS | Mod: PBBFAC,,,

## 2022-06-30 PROCEDURE — 3288F FALL RISK ASSESSMENT DOCD: CPT | Mod: CPTII,S$GLB,, | Performed by: INTERNAL MEDICINE

## 2022-06-30 PROCEDURE — 3044F PR MOST RECENT HEMOGLOBIN A1C LEVEL <7.0%: ICD-10-PCS | Mod: CPTII,S$GLB,, | Performed by: INTERNAL MEDICINE

## 2022-06-30 PROCEDURE — 1126F AMNT PAIN NOTED NONE PRSNT: CPT | Mod: CPTII,S$GLB,, | Performed by: INTERNAL MEDICINE

## 2022-06-30 PROCEDURE — 3078F PR MOST RECENT DIASTOLIC BLOOD PRESSURE < 80 MM HG: ICD-10-PCS | Mod: CPTII,S$GLB,, | Performed by: INTERNAL MEDICINE

## 2022-06-30 PROCEDURE — 3074F PR MOST RECENT SYSTOLIC BLOOD PRESSURE < 130 MM HG: ICD-10-PCS | Mod: CPTII,S$GLB,, | Performed by: INTERNAL MEDICINE

## 2022-06-30 PROCEDURE — 1101F PR PT FALLS ASSESS DOC 0-1 FALLS W/OUT INJ PAST YR: ICD-10-PCS | Mod: CPTII,S$GLB,, | Performed by: INTERNAL MEDICINE

## 2022-06-30 PROCEDURE — 99499 UNLISTED E&M SERVICE: CPT | Mod: S$GLB,,, | Performed by: INTERNAL MEDICINE

## 2022-06-30 PROCEDURE — 3066F PR DOCUMENTATION OF TREATMENT FOR NEPHROPATHY: ICD-10-PCS | Mod: CPTII,S$GLB,, | Performed by: INTERNAL MEDICINE

## 2022-06-30 PROCEDURE — 3044F HG A1C LEVEL LT 7.0%: CPT | Mod: CPTII,S$GLB,, | Performed by: INTERNAL MEDICINE

## 2022-06-30 PROCEDURE — 3288F PR FALLS RISK ASSESSMENT DOCUMENTED: ICD-10-PCS | Mod: CPTII,S$GLB,, | Performed by: INTERNAL MEDICINE

## 2022-06-30 PROCEDURE — 99214 OFFICE O/P EST MOD 30 MIN: CPT | Mod: S$GLB,,, | Performed by: INTERNAL MEDICINE

## 2022-06-30 PROCEDURE — 1160F RVW MEDS BY RX/DR IN RCRD: CPT | Mod: CPTII,S$GLB,, | Performed by: INTERNAL MEDICINE

## 2022-06-30 PROCEDURE — 4010F ACE/ARB THERAPY RXD/TAKEN: CPT | Mod: CPTII,S$GLB,, | Performed by: INTERNAL MEDICINE

## 2022-06-30 PROCEDURE — 4010F PR ACE/ARB THEARPY RXD/TAKEN: ICD-10-PCS | Mod: CPTII,S$GLB,, | Performed by: INTERNAL MEDICINE

## 2022-06-30 PROCEDURE — 99999 PR PBB SHADOW E&M-EST. PATIENT-LVL IV: CPT | Mod: PBBFAC,,,

## 2022-06-30 PROCEDURE — 3061F PR NEG MICROALBUMINURIA RESULT DOCUMENTED/REVIEW: ICD-10-PCS | Mod: CPTII,S$GLB,, | Performed by: INTERNAL MEDICINE

## 2022-06-30 PROCEDURE — 1101F PT FALLS ASSESS-DOCD LE1/YR: CPT | Mod: CPTII,S$GLB,, | Performed by: INTERNAL MEDICINE

## 2022-06-30 PROCEDURE — 3061F NEG MICROALBUMINURIA REV: CPT | Mod: CPTII,S$GLB,, | Performed by: INTERNAL MEDICINE

## 2022-06-30 PROCEDURE — 93798 PR CARDIAC REHAB/MONITOR: ICD-10-PCS | Mod: S$GLB,,, | Performed by: INTERNAL MEDICINE

## 2022-06-30 PROCEDURE — 99499 RISK ADDL DX/OHS AUDIT: ICD-10-PCS | Mod: S$GLB,,, | Performed by: INTERNAL MEDICINE

## 2022-06-30 PROCEDURE — 1160F PR REVIEW ALL MEDS BY PRESCRIBER/CLIN PHARMACIST DOCUMENTED: ICD-10-PCS | Mod: CPTII,S$GLB,, | Performed by: INTERNAL MEDICINE

## 2022-06-30 PROCEDURE — 93798 PHYS/QHP OP CAR RHAB W/ECG: CPT | Mod: S$GLB,,, | Performed by: INTERNAL MEDICINE

## 2022-06-30 PROCEDURE — 99214 PR OFFICE/OUTPT VISIT, EST, LEVL IV, 30-39 MIN: ICD-10-PCS | Mod: S$GLB,,, | Performed by: INTERNAL MEDICINE

## 2022-06-30 PROCEDURE — 3066F NEPHROPATHY DOC TX: CPT | Mod: CPTII,S$GLB,, | Performed by: INTERNAL MEDICINE

## 2022-06-30 PROCEDURE — 3078F DIAST BP <80 MM HG: CPT | Mod: CPTII,S$GLB,, | Performed by: INTERNAL MEDICINE

## 2022-06-30 PROCEDURE — 1159F PR MEDICATION LIST DOCUMENTED IN MEDICAL RECORD: ICD-10-PCS | Mod: CPTII,S$GLB,, | Performed by: INTERNAL MEDICINE

## 2022-06-30 PROCEDURE — 99999 PR PBB SHADOW E&M-EST. PATIENT-LVL V: ICD-10-PCS | Mod: PBBFAC,,, | Performed by: INTERNAL MEDICINE

## 2022-06-30 NOTE — PROGRESS NOTES
Session: Orientation Cardiac Rehab Individual Treatment Plan - Initial Assessment      Patient Name: Kaylee Romero MRN: [id number]   : 1945   Age: 77 y.o.   Date of Event: 2019   Primary Diagnosis: CABG   EF: 60%    Physical Assessment:   /68 (BP Location: Right arm, Patient Position: Standing, BP Method: Large (Manual))   Pulse 98   Resp 18   LMP 2001 (Approximate)   SpO2 99%     ASSESSMENT:  Heart Sounds: regular rate and rhythm  Prosthetic Valve: No  Lung Sounds: normal air entry, lungs clear to auscultation  Capillary Refill: normal  Left Radial Pulse: Normal (+2)  Right Radial Pulse: Normal (+2)  Left Pedal Pulse: Normal (+2)  Right Pedal Pulse: Normal (+2)  Right Edema: Trace  Left Edema Trace  Strength: normal  Range of Motion: full range of motion  Existing Limitations:      Site   [x] Arthritis, bursitis    [] Amputation, atrophy    [x] Other: Right knee & left shoulder   []       Diabetic patient's foot examination comments: Normal -  Bilateral.  Patient's feet are dry & scaly.  Patient states that she tries to apply lotion every day, but didn't today.  Incisional site: N/A  Special needs: Right knee issues & left shoulder.    Psychosocial Assessment:   Outcome Survey Tools:    LOLA SCORES:   PRE   Anxiety 1   Depression 1   Somatic 6   Hostility 0     SF-36 SCORES:   PRE   Physical Function 12   Social Function 11   Mental Health 30   Pain 4   Change in Health 3   Physical Role Limitation 0   Mental Role Limitation 3   Energy/Fatigue 10   Health Perceptions 12   Total Score 85     PHQ-9:  PHQ-9 Depression Patient Health Questionnaire 2022   Over the last two weeks how often have you been bothered by little interest or pleasure in doing things 2   Over the last two weeks how often have you been bothered by feeling down, depressed or hopeless 0   Over the last two weeks how often have you been bothered by trouble falling or staying asleep, or sleeping too much 0   Over  the last two weeks how often have you been bothered by feeling tired or having little energy 3   Over the last two weeks how often have you been bothered by a poor appetite or overeating 0   Over the last two weeks how often have you been bothered by feeling bad about yourself - or that you are a failure or have let yourself or your family down 0   Over the last two weeks how often have you been bothered by trouble concentrating on things, such as reading the newspaper or watching television 0   Over the last two weeks how often have you been bothered by moving or speaking so slowly that other people could have noticed. 2   Over the last two weeks how often have you been bothered by thoughts that you would be better off dead, or of hurting yourself 0   If you checked off any problems, how difficult have these problems made it for you to do your work, take care of things at home or get along with other people? Very difficult   Total Score 7              Living Arrangements: Lives with spouse  Family Support: spouse  Self Reported: Effective Coping Skills  Displays: happiness and calmness  Medication: not applicable    Psychosocial Plan:   Goals:  Reduce manifestation of chronic pain  Maintain positive support system  Maintain positive outlook  Improve overall quality of life    Interventions/Recommendations:  Discussed Results of Surveys  Patient to Self Report Emotional Changes at Session Check In  Recommend Physical Activity  Recommend Attending Education Lectures  Notify MD: No  Program Referral: No  Pharmaceutical Intervention/Therapy: No  Other Needs: not applicable  Stage of Readiness to Change: Action    Education:  Stress management, verbalizes understanding; Date:6/30/2022  Stress, verbalizes understanding; Date:6/30/2022    Comments:  Patient reports to have no stress at all.  She is retired & states that her  is very supportive.  She has been instructed to notify staff in the event that  circumstances change.  Patient verbalizes understanding.    Other Core Components/Risk Factors Assessment:   RISK FACTORS:  diabetes, hyperlipidemia, hypertension, obesity, positive family history, sedentary lifestyle    Learning Barriers: None and Physical Condition    Education Level:  Post-College Graduate Degree    Pre-test Score: 50%    Medication Compliance: has been compliant with taking medications    Other Core Components/Risk Factors Plan:   Goals:  Increase knowledge of CAD: In Progress  Weight loss: In Progress  Control diabetes by adjusting diet and exercise: In Progress  Learn more about healthy eating: In Progress    Interventions/Recommendations:  Recommend regular attendance for Cardiac Rehab: Exercise and Education Lectures  Encourage medication compliance  Individual Education/ Counseling: Yes  Physician Referral: No    Education:    cholesterol, verbalizes understanding; Date: 6/30/2022  diabetes, verbalizes understanding; Date: 6/30/2022  fluid overload/CHF, verbalizes understanding; Date: 6/30/2022  hypertension, verbalizes understanding; Date: 6/30/2022  risk factors, verbalizes understanding; Date: 6/30/2022        Education method adapted to patients education level and preferred method of learning.  Method: explanation    Comments:  Patient will be working to decrease risk factors for CAD.    Other Core Components/Hypertension Assessment:   Resting BP: 112/68  BP Readings from Last 1 Encounters:   06/30/22 112/68     BP Diagnosis: Hypertensive  Patient reported symptoms: none    Other Core Components/Hypertension Plan:   Goals:  Blood Pressure <130/80    Interventions/Recommendations:  Med Card Reconciled: Yes  Encourage medication compliance  Encourage sodium reduction  Encourage weight loss  Recommend physical activity  Educate on contributory factors  Encourage home blood pressure monitoring  Recommend daily weights    Education:    Hypertension; verbalizes understanding; Date:  6/30/2022  Coronary Artery Disease; verbalizes understanding; Date: 6/30/2022  Risk Factors; verbalizes understanding; Date: 6/30/2022        Comments:  Patient reports that she monitors BP a few days per week.  Encouraged for her to continue.      Does the patient have Heart Failure? No    Other Core Components/Tobacco Cessation Assessment:   Smoking Status: lifetime non-smoker  Smoking Cessation Barriers: N/A  Stage of Readiness to Change: Maintenance    Other Core Components/Tobacco Cessation Plan:   Goals:  Maintain non-smoking status    Interventions:  Maintains non-smoking status    Education:    Risk Factors; verbalizes understanding; Date: 6/30/2022        Comments:  Non smoker.    Discussed Cardiac Rehab program in depth with patient.  Medication list updated per patient & marked as reviewed.  Patient has been instructed to notify staff of any problems while attending rehab (ie: chest pain, shortness of breath, lightheadedness, dizziness).  Patient has been instructed to monitor blood pressure readings outside of rehab & to keep a daily log of the readings.  Patient verbalizes understanding.    Roxana Avalos RN  Cardiac Rehab Nurse

## 2022-06-30 NOTE — PROGRESS NOTES
Orientation Cardiac Rehab Individual Treatment Plan - Initial Assessment      Patient Name: Kaylee Romero MRN: [id number]   : 1945   Age: 77 y.o.   Primary Diagnosis: s/p CABG, CAD, HTN, h/o MI, h/o TIA, DM    Nutrition Assessment:     Anthropometrics    Height 62 inches   Weight 234 lbs   BMI 43   Abdominal Girth 48.5   Body Composition 30.2       Drug Allergies and Intolerances:  Review of patient's allergies indicates:   Allergen Reactions    Bactrim [sulfamethoxazole-trimethoprim] Other (See Comments)     Generic version  Sulfa makes her sick    Keflex [cephalexin] Other (See Comments)     Turns orange       Food Allergies and Intolerances:  NA    Past Medical History:  Past Medical History:   Diagnosis Date    Acid reflux     Age-related osteoporosis without current pathological fracture 2019    Cataract     CKD (chronic kidney disease) stage 3, GFR 30-59 ml/min     Dry mouth     History of TIA (transient ischemic attack) 2018    Hyperlipidemia 2014    Hypertensive heart disease with diastolic heart failure 2012    Morbid obesity with body mass index (BMI) of 40.0 or higher 2016    KAMRAN (obstructive sleep apnea)     KAMRAN on CPAP 2016    Osteoporosis without current pathological fracture 2018    Physical deconditioning 2018    Status post total left knee replacement 2017    Type 2 diabetes mellitus with stage 3 chronic kidney disease, without long-term current use of insulin 2019       Past Surgical History:  Past Surgical History:   Procedure Laterality Date    ankle surgery (l)      APPENDECTOMY       SECTION      CORONARY ARTERY BYPASS GRAFT  09/2019    x 2    CORONARY ARTERY BYPASS GRAFT (CABG) N/A 2019    Procedure: CORONARY ARTERY BYPASS GRAFT (CABG)X3;  Surgeon: Peterson Ventura MD;  Location[facility] OR [address] [place] [address];  Service: Cardiovascular;  Laterality: N/A;    CORONARY BYPASS GRAFT ANGIOGRAPHY   "10/18/2021    Procedure: Bypass graft study;  Surgeon: Jamar Haddad MD;  Location: [facility] CATH LAB;  Service: Cardiology;;    DILATION AND CURETTAGE OF UTERUS      ENDOSCOPIC HARVEST OF VEIN Left 8/12/2019    Procedure: SURGICAL PROCUREMENT, VEIN, ENDOSCOPIC;  Surgeon: Peterson Ventura MD;  Location: [facility] OR [facility] FLR;  Service: Cardiovascular;  Laterality: Left;  Vein [place] Start: 0843; End: 1054   Vein Prep Start: 1055; End: 1145    KNEE ARTHROSCOPY W/ DEBRIDEMENT      KNEE SURGERY Left 05/01/2017    TKR    LEFT HEART CATHETERIZATION Left 7/9/2019    Procedure: Left heart cath;  Surgeon: Ronak Gibbons MD;  Location: [facility] CATH LAB;  Service: Cardiology;  Laterality: Left;    LEFT HEART CATHETERIZATION Left 10/18/2021    Procedure: Left heart cath;  Surgeon: Jamar Haddad MD;  Location: [facility] CATH LAB;  Service: Cardiology;  Laterality: Left;       Medications:  Current Outpatient Medications   Medication Sig    acetaminophen (TYLENOL) 500 MG tablet Take 500 mg by mouth 2 (two) times daily as needed for Pain.    alendronate (FOSAMAX) 70 MG tablet TAKE 1 TABLET(70 MG) BY MOUTH EVERY 7 DAYS (Patient taking differently: Take 70 mg by mouth every Sunday.)    amLODIPine (NORVASC) 10 MG tablet TAKE 1 TABLET(10 MG) BY MOUTH EVERY DAY    aspirin 81 MG Chew Take 1 tablet (81 mg total) by mouth once daily.    atorvastatin (LIPITOR) 80 MG tablet Take 1 tablet (80 mg total) by mouth once daily.    BD BOO 2ND GEN PEN NEEDLE 32 gauge x 5/32" Ndle USE EVERY DAY    blood sugar diagnostic Strp 1 strip by Misc.(Non-Drug; Combo Route) route once daily.    carvediloL (COREG) 25 MG tablet TAKE 1 TABLET(25 MG) BY MOUTH TWICE DAILY WITH MEALS    clopidogreL (PLAVIX) 75 mg tablet Take 1 tablet (75 mg total) by mouth once daily.    COD LIVER OIL ORAL TAKE 2 CAPSULES BY MOUTH EVERY MORNING AND 1 CAPSULE EVERY EVENING    diclofenac sodium (VOLTAREN) 1 % Gel Apply 2 g topically 4 (four) times daily as needed. " To painful area on the feet. (Patient taking differently: Apply 2 g topically 4 (four) times daily as needed. To painful area on the feet and knee)    diphenoxylate-atropine 2.5-0.025 mg (LOMOTIL) 2.5-0.025 mg per tablet Take 1 tablet by mouth 4 (four) times daily as needed for Diarrhea.    donepeziL (ARICEPT) 5 MG tablet TAKE 1 TABLET(5 MG) BY MOUTH EVERY EVENING FOR MEMORY    dulaglutide (TRULICITY) 4.5 mg/0.5 mL pen injector Inject 4.5 mg into the skin every 7 days. (Patient taking differently: Inject 4.5 mg into the skin every Sunday.)    FEROSUL 325 mg (65 mg iron) Tab tablet TAKE 1 TABLET BY MOUTH DAILY    furosemide (LASIX) 20 MG tablet TAKE 1 TABLET(20 MG) BY MOUTH EVERY DAY (Patient taking differently: Take 20 mg by mouth every Sat, Sun, Mon, Wed, & Fri)    glipiZIDE (GLUCOTROL) 5 MG tablet Take 2 tablets (10 mg total) by mouth 2 (two) times daily with meals. TAKE 2 TABLETS BY MOUTH DAILY WITH DINNER OR EVENING MEAL (Patient taking differently: Take 10 mg by mouth 2 (two) times daily with meals.)    glucagon (GVOKE HYPOPEN 2-PACK) 1 mg/0.2 mL AtIn Inject 1 Package into the skin as needed.    insulin glargine,hum.rec.anlog (BASAGLAR KWIKPEN U-100 INSULIN SUBQ) Inject 22 Units into the skin every evening.    irbesartan (AVAPRO) 300 MG tablet Take 1 tablet (300 mg total) by mouth every evening.    isosorbide mononitrate (IMDUR) 30 MG 24 hr tablet Take 2 tablets (60 mg total) by mouth once daily.    lancets Misc 1 lancet by Misc.(Non-Drug; Combo Route) route once daily.    multivitamin (THERAGRAN) per tablet Take 1 tablet by mouth once daily.    nitroGLYCERIN (NITROSTAT) 0.4 MG SL tablet Place 1 tablet (0.4 mg total) under the tongue every 5 (five) minutes as needed for Chest pain.    vitamin D (VITAMIN D3) 1000 units Tab Take 1,000 Units by mouth twice a week. Monday AND THURSDAYS ONLY     Current Facility-Administered Medications   Medication    hyaluronate sodium, stabilized (MONOVISC) Syrg        Vitamins and Supplements:  MVI, Vit D3    Labs:  Patient confirms she is taking lipitor 80mg for cholesterol control.    Lab Results   Component Value Date    CHOL 84 (L) 06/27/2022     Lab Results   Component Value Date    HDL 33 (L) 06/27/2022     Lab Results   Component Value Date    LDLCALC 38.2 (L) 06/27/2022     Lab Results   Component Value Date    TRIG 64 06/27/2022     Lab Results   Component Value Date    CHOLHDL 39.3 06/27/2022         Lab Results   Component Value Date    GLUF 189 (H) 06/23/2022     Lab Results   Component Value Date    HGBA1C 6.4 (H) 06/23/2022       Nutrition/Diet History:  Patient eats 1 meal daily.    Seasons food with [employer] occasionally.  Patient denies use of a salt shaker at the table on prepared foods.   Dines out 1 per week at restaurants such as [employer].    Chooses fried foods rarely  Chooses fish rarely  Beverages:  water  Alcohol: none    24 Hour Recall:  · Breakfast: part of omelette with peppers/cheese/beef with crawfish, orange juice  · Lunch:none   · Dinner roasted chicken sandwich  · Other: NA    Difficulty Chewing or Swallowing: no  Current Exercise: See Exercise Physiologist Note  Food Safety/Food Preparation: shares with spouse  Living Arrangements/Family Support: Lives with spouse  Cultural/Spiritual/Personal Preferences: not applicable   Barriers to Education: none identified  Stage of Change Related to Diet Habits: Action    Nutrition Diagnosis:  1. Food and nutrition related knowledge deficit related to the lack of prior nutrition education as evidenced by diet history and 24 hour recall    Nutrition Plan:   Goals:  LDL-C < 70 (for high risk patients)  Hgb A1c < 7%  BMI < 25 and abdominal girth < 40M/<35 F  2 gram sodium, Mediterranean diet  Rehab weight goal: 10lbs  Fish intake (non-fried varieties) to a goal of 2-3 servings per week.   Increase fruit and vegetable intake    Interventions/Recommendations:  Lab results reviewed and  discussed  Nutrition Prescription:  · Total Energy Estimated Needs: 1999-1803 Kcal/d for weight loss  · Method for Estimating Needs: 20-25kcal/kg ABW  · Total Protein Estimated Needs: 51-77 g/d  · Method for Estimating Needs: 0.8-1.2 g/Kg ABW  · Total Fluid Estimated Needs: 1 mL/Kcal  Dietitian Consult: No  Patient to participate in Cardiac Rehab sessions three times a week  Weekly Dietitian Weight Check  Encouraged patient to complete 3 day food diary  Follow Up Plan for Ongoing Self-Management Support    Education:  Mediterranean Diet; verbalizes understanding; Date: 6/30/22   Person taught: patient    Preferred Learning Method: Verbal and written   Education Needed/Provided: Nutrition counseling and education related to cardiac rehabilitation   Education Method: Weekly nutrition lectures on the Mediterranean diet, cooking, shopping, and dining out   Written Materials Provided: 3 Day Food Record, Introduction to Mediterranean Diet   Strategies Implemented: Motivational interviewing, Goal setting, Self-Monitoring, and Problem Solving    Comments:   Discussed ways to incorporate healthy snacks, eating on a schedule, and monitoring sodium intake for heart health.  Discussed improving produce intake and fish intake    Diabetes  Is the patient diabetic?   Yes   Other Core Components/Diabetes Assessment:   Labs:  Lab Results   Component Value Date    GLUF 189 (H) 06/23/2022    GLUF 121 (H) 12/11/2019    GLUF 106 04/07/2011     Lab Results   Component Value Date    HGBA1C 6.4 (H) 06/23/2022    HGBA1C 6.0 (H) 05/04/2022    HGBA1C 6.3 (H) 01/06/2022      Lab Results   Component Value Date    ESTIMATEDAVG 137 (H) 06/23/2022    ESTIMATEDAVG 126 05/04/2022    ESTIMATEDAVG 134 (H) 01/06/2022       History of diabetes since 2013  Diabetes medications: Trulicity 4.5mg injection weekly, Basaglar insulin 22u @[facility], Glipizide 10mg BID  Blood Glucose Checks at Home: Yes: Other: 3x weekly  Typical morning range: 110-140  mg/dl  Endocrinologist or PCP following DM: Dr. Fox endocrinology    Other Core Components/Diabetes Plan:   Goals:  Hgb A1c < 7%  Exercise Blood Glucose: 100-300 mg/dl    Interventions:  Reviewed and Discussed Labs  Medication Compliance  Med Card Reconciled  Low Sodium, Mediterranean, ADA Diet  Weight Management  Physical Activity  Increase Knowledge of Contributory Factors  Home Monitoring    Education:  Mediterranean Diet; verbalizes understanding; Date: 6/30/22    Comments:   Patient verbalizes understanding to bring home glucometer and check glucose pre and post each exercise session.  Per cardiac rehab protocols, patient's glucose must be between 90 and 270 mg/dL to exercise.  Patient denies any recent glucose levels less than 60 mg/dL or greater than 300 mg/dL. Patient verbalizes importance of notifying rehab staff if symptoms of hypoglycemia occur while at cardiac rehab.  Abnormal labs will be reported to patient's PCP/Endocrinologist by rehab staff.    Noted updated glucose parameters of 100-300    Emphasized importance of monitoring blood sugars daily  RD contact information provided.      Keila Mccarty MS, RDN/LDN

## 2022-07-06 ENCOUNTER — CLINICAL SUPPORT (OUTPATIENT)
Dept: CARDIAC REHAB | Facility: CLINIC | Age: 77
End: 2022-07-06
Payer: MEDICARE

## 2022-07-06 DIAGNOSIS — I25.10 CORONARY ATHEROSCLEROSIS OF NATIVE CORONARY ARTERY: ICD-10-CM

## 2022-07-06 DIAGNOSIS — Z95.1 POSTSURGICAL AORTOCORONARY BYPASS STATUS: ICD-10-CM

## 2022-07-06 PROCEDURE — 93798 PR CARDIAC REHAB/MONITOR: ICD-10-PCS | Mod: S$GLB,,, | Performed by: INTERNAL MEDICINE

## 2022-07-06 PROCEDURE — 93798 PHYS/QHP OP CAR RHAB W/ECG: CPT | Mod: S$GLB,,, | Performed by: INTERNAL MEDICINE

## 2022-07-07 ENCOUNTER — PATIENT MESSAGE (OUTPATIENT)
Dept: PRIMARY CARE CLINIC | Facility: CLINIC | Age: 77
End: 2022-07-07
Payer: MEDICARE

## 2022-07-08 ENCOUNTER — PATIENT MESSAGE (OUTPATIENT)
Dept: CARDIOLOGY | Facility: CLINIC | Age: 77
End: 2022-07-08
Payer: MEDICARE

## 2022-07-08 ENCOUNTER — CLINICAL SUPPORT (OUTPATIENT)
Dept: CARDIAC REHAB | Facility: CLINIC | Age: 77
End: 2022-07-08
Payer: MEDICARE

## 2022-07-08 DIAGNOSIS — I25.118 CORONARY ARTERY DISEASE OF NATIVE ARTERY OF NATIVE HEART WITH STABLE ANGINA PECTORIS: ICD-10-CM

## 2022-07-08 DIAGNOSIS — I25.10 ATHEROSCLEROSIS OF NATIVE CORONARY ARTERY OF NATIVE HEART, UNSPECIFIED WHETHER ANGINA PRESENT: ICD-10-CM

## 2022-07-08 DIAGNOSIS — Z95.1 POSTSURGICAL AORTOCORONARY BYPASS STATUS: ICD-10-CM

## 2022-07-08 DIAGNOSIS — G31.84 MILD COGNITIVE IMPAIRMENT: ICD-10-CM

## 2022-07-08 PROCEDURE — 93798 PR CARDIAC REHAB/MONITOR: ICD-10-PCS | Mod: S$GLB,,,

## 2022-07-08 PROCEDURE — 93798 PHYS/QHP OP CAR RHAB W/ECG: CPT | Mod: S$GLB,,,

## 2022-07-08 RX ORDER — ATORVASTATIN CALCIUM 80 MG/1
TABLET, FILM COATED ORAL
Qty: 90 TABLET | Refills: 3 | Status: SHIPPED | OUTPATIENT
Start: 2022-07-08 | End: 2023-07-24 | Stop reason: SDUPTHER

## 2022-07-08 NOTE — TELEPHONE ENCOUNTER
Refill Routing Note   Medication(s) are not appropriate for processing by [facility] for the following reason(s):      - Outside of protocol    [facility] action(s):  Route  Approve       Medication Therapy Plan: Route aricept - OOS. Approve lipitor.  Medication reconciliation completed: No     Appointments  past 12m or future 3m with PCP    Date Provider   Last Visit   6/27/2022 Liz Roberts MD   Next Visit   12/30/2022 Liz Roberts MD   ED visits in past 90 days: 0        Note composed:1:01 PM 07/08/2022

## 2022-07-08 NOTE — TELEPHONE ENCOUNTER
No new care gaps identified.  [facility] Embedded Care Gaps. Reference number: [id number]. 7/08/2022   5:55:59 AM CDT

## 2022-07-09 RX ORDER — DONEPEZIL HYDROCHLORIDE 5 MG/1
TABLET, FILM COATED ORAL
Qty: 90 TABLET | Refills: 2 | OUTPATIENT
Start: 2022-07-09

## 2022-07-11 ENCOUNTER — CLINICAL SUPPORT (OUTPATIENT)
Dept: CARDIAC REHAB | Facility: CLINIC | Age: 77
End: 2022-07-11
Payer: MEDICARE

## 2022-07-11 DIAGNOSIS — Z95.1 POSTSURGICAL AORTOCORONARY BYPASS STATUS: ICD-10-CM

## 2022-07-11 DIAGNOSIS — I25.10 ATHEROSCLEROSIS OF NATIVE CORONARY ARTERY OF NATIVE HEART, UNSPECIFIED WHETHER ANGINA PRESENT: ICD-10-CM

## 2022-07-11 PROCEDURE — 93798 PHYS/QHP OP CAR RHAB W/ECG: CPT | Mod: S$GLB,,,

## 2022-07-11 PROCEDURE — 93798 PR CARDIAC REHAB/MONITOR: ICD-10-PCS | Mod: S$GLB,,,

## 2022-07-13 ENCOUNTER — CLINICAL SUPPORT (OUTPATIENT)
Dept: CARDIAC REHAB | Facility: CLINIC | Age: 77
End: 2022-07-13
Payer: MEDICARE

## 2022-07-13 DIAGNOSIS — I25.10 CORONARY ATHEROSCLEROSIS OF NATIVE CORONARY ARTERY: ICD-10-CM

## 2022-07-13 DIAGNOSIS — Z95.1 POSTSURGICAL AORTOCORONARY BYPASS STATUS: ICD-10-CM

## 2022-07-13 PROCEDURE — 93798 PR CARDIAC REHAB/MONITOR: ICD-10-PCS | Mod: S$GLB,,, | Performed by: INTERNAL MEDICINE

## 2022-07-13 PROCEDURE — 93798 PHYS/QHP OP CAR RHAB W/ECG: CPT | Mod: S$GLB,,, | Performed by: INTERNAL MEDICINE

## 2022-07-14 ENCOUNTER — TELEPHONE (OUTPATIENT)
Dept: CARDIOLOGY | Facility: HOSPITAL | Age: 77
End: 2022-07-14
Payer: MEDICARE

## 2022-07-15 ENCOUNTER — CLINICAL SUPPORT (OUTPATIENT)
Dept: CARDIAC REHAB | Facility: CLINIC | Age: 77
End: 2022-07-15
Payer: MEDICARE

## 2022-07-15 DIAGNOSIS — Z95.1 POSTSURGICAL AORTOCORONARY BYPASS STATUS: ICD-10-CM

## 2022-07-15 DIAGNOSIS — I25.10 CORONARY ARTERY DISEASE, UNSPECIFIED VESSEL OR LESION TYPE, UNSPECIFIED WHETHER ANGINA PRESENT, UNSPECIFIED WHETHER NATIVE OR TRANSPLANTED HEART: ICD-10-CM

## 2022-07-15 PROCEDURE — 93798 PR CARDIAC REHAB/MONITOR: ICD-10-PCS | Mod: S$GLB,,, | Performed by: INTERNAL MEDICINE

## 2022-07-15 PROCEDURE — 93798 PHYS/QHP OP CAR RHAB W/ECG: CPT | Mod: S$GLB,,, | Performed by: INTERNAL MEDICINE

## 2022-07-15 NOTE — PROGRESS NOTES
Pt arrived for cardiac rehab class.  Pt ate and had no medication changes. The patient tolerated exercise session well with no complaints

## 2022-07-17 ENCOUNTER — PATIENT MESSAGE (OUTPATIENT)
Dept: CARDIOLOGY | Facility: CLINIC | Age: 77
End: 2022-07-17
Payer: MEDICARE

## 2022-07-18 ENCOUNTER — HOSPITAL ENCOUNTER (OUTPATIENT)
Dept: CARDIOLOGY | Facility: HOSPITAL | Age: 77
Discharge: HOME | End: 2022-07-18
Attending: HOSPITALIST
Payer: MEDICARE

## 2022-07-18 VITALS
RESPIRATION RATE: 18 BRPM | HEART RATE: 95 BPM | HEIGHT: 62 IN | DIASTOLIC BLOOD PRESSURE: 68 MMHG | BODY MASS INDEX: 43.24 KG/M2 | SYSTOLIC BLOOD PRESSURE: 121 MMHG | WEIGHT: 235 LBS

## 2022-07-18 DIAGNOSIS — I25.708 CORONARY ARTERY DISEASE OF BYPASS GRAFT OF NATIVE HEART WITH STABLE ANGINA PECTORIS: Chronic | ICD-10-CM

## 2022-07-18 LAB
CFR FLOW - ANTERIOR: 1.08
CFR FLOW - INFERIOR: 1.37
CFR FLOW - LATERAL: 1.37
CFR FLOW - MAX: 1.81
CFR FLOW - MIN: 0.67
CFR FLOW - SEPTAL: 1.55
CFR FLOW - WHOLE HEART: 1.34
CV STRESS BASE HR: 93 BPM
DIASTOLIC BLOOD PRESSURE: 79 MMHG
EJECTION FRACTION- HIGH: 65 %
END DIASTOLIC INDEX-HIGH: 153 ML/M2
END DIASTOLIC INDEX-LOW: 93 ML/M2
END SYSTOLIC INDEX-HIGH: 71 ML/M2
END SYSTOLIC INDEX-LOW: 31 ML/M2
NUC REST DIASTOLIC VOLUME INDEX: 69
NUC REST EJECTION FRACTION: 77
NUC REST SYSTOLIC VOLUME INDEX: 16
NUC STRESS DIASTOLIC VOLUME INDEX: 71
NUC STRESS EJECTION FRACTION: 66 %
NUC STRESS SYSTOLIC VOLUME INDEX: 24
OHS CV CPX 1 MINUTE RECOVERY HEART RATE: 101 BPM
OHS CV CPX 85 PERCENT MAX PREDICTED HEART RATE MALE: 119
OHS CV CPX MAX PREDICTED HEART RATE: 140
OHS CV CPX PATIENT IS FEMALE: 1
OHS CV CPX PATIENT IS MALE: 0
OHS CV CPX PEAK DIASTOLIC BLOOD PRESSURE: 42 MMHG
OHS CV CPX PEAK HEAR RATE: 89 BPM
OHS CV CPX PEAK RATE PRESSURE PRODUCT: 9434
OHS CV CPX PEAK SYSTOLIC BLOOD PRESSURE: 106 MMHG
OHS CV CPX PERCENT MAX PREDICTED HEART RATE ACHIEVED: 64
OHS CV CPX RATE PRESSURE PRODUCT PRESENTING: NORMAL
PERFUSION DEFECT 1 SIZE IN %: 3 %
REST FLOW - ANTERIOR: 0.59 CC/MIN/G
REST FLOW - INFERIOR: 0.61 CC/MIN/G
REST FLOW - LATERAL: 0.65 CC/MIN/G
REST FLOW - MAX: 0.79 CC/MIN/G
REST FLOW - MIN: 0.36 CC/MIN/G
REST FLOW - SEPTAL: 0.58 CC/MIN/G
REST FLOW - WHOLE HEART: 0.61 CC/MIN/G
RETIRED EF AND QEF - SEE NOTES: 53 %
STRESS FLOW - ANTERIOR: 0.63 CC/MIN/G
STRESS FLOW - INFERIOR: 0.83 CC/MIN/G
STRESS FLOW - LATERAL: 0.89 CC/MIN/G
STRESS FLOW - MAX: 1.17 CC/MIN/G
STRESS FLOW - MIN: 0.43 CC/MIN/G
STRESS FLOW - SEPTAL: 0.89 CC/MIN/G
STRESS FLOW - WHOLE HEART: 0.81 CC/MIN/G
SYSTOLIC BLOOD PRESSURE: 131 MMHG

## 2022-07-18 PROCEDURE — 93016 CARDIAC PET SCAN STRESS (CUPID ONLY): ICD-10-PCS | Mod: ,,, | Performed by: INTERNAL MEDICINE

## 2022-07-18 PROCEDURE — 78431 MYOCRD IMG PET RST&STRS CT: CPT | Mod: 26,,, | Performed by: INTERNAL MEDICINE

## 2022-07-18 PROCEDURE — 93018 CARDIAC PET SCAN STRESS (CUPID ONLY): ICD-10-PCS | Mod: ,,, | Performed by: INTERNAL MEDICINE

## 2022-07-18 PROCEDURE — 78434 AQMBF PET REST & RX STRESS: CPT | Mod: 26,,, | Performed by: INTERNAL MEDICINE

## 2022-07-18 PROCEDURE — 78434 AQMBF PET REST & RX STRESS: CPT

## 2022-07-18 PROCEDURE — 78431 CARDIAC PET SCAN STRESS (CUPID ONLY): ICD-10-PCS | Mod: 26,,, | Performed by: INTERNAL MEDICINE

## 2022-07-18 PROCEDURE — 63600175 PHARM REV CODE 636 W HCPCS: Performed by: HOSPITALIST

## 2022-07-18 PROCEDURE — 93018 CV STRESS TEST I&R ONLY: CPT | Mod: ,,, | Performed by: INTERNAL MEDICINE

## 2022-07-18 PROCEDURE — 93016 CV STRESS TEST SUPVJ ONLY: CPT | Mod: ,,, | Performed by: INTERNAL MEDICINE

## 2022-07-18 PROCEDURE — 78431 MYOCRD IMG PET RST&STRS CT: CPT

## 2022-07-18 PROCEDURE — 78434 CARDIAC PET SCAN STRESS (CUPID ONLY): ICD-10-PCS | Mod: 26,,, | Performed by: INTERNAL MEDICINE

## 2022-07-18 RX ORDER — AMINOPHYLLINE 25 MG/ML
75 INJECTION, SOLUTION INTRAVENOUS ONCE
Status: COMPLETED | OUTPATIENT
Start: 2022-07-18 | End: 2022-07-18

## 2022-07-18 RX ORDER — REGADENOSON 0.08 MG/ML
0.4 INJECTION, SOLUTION INTRAVENOUS ONCE
Status: COMPLETED | OUTPATIENT
Start: 2022-07-18 | End: 2022-07-18

## 2022-07-18 RX ADMIN — REGADENOSON 0.4 MG: 0.08 INJECTION, SOLUTION INTRAVENOUS at 01:07

## 2022-07-18 RX ADMIN — AMINOPHYLLINE 75 MG: 25 INJECTION, SOLUTION INTRAVENOUS at 01:07

## 2022-07-19 DIAGNOSIS — I25.810 CORONARY ARTERY DISEASE INVOLVING CORONARY BYPASS GRAFT OF NATIVE HEART WITHOUT ANGINA PECTORIS: ICD-10-CM

## 2022-07-20 ENCOUNTER — CLINICAL SUPPORT (OUTPATIENT)
Dept: CARDIAC REHAB | Facility: CLINIC | Age: 77
End: 2022-07-20
Payer: MEDICARE

## 2022-07-20 DIAGNOSIS — I25.10 ATHEROSCLEROSIS OF NATIVE CORONARY ARTERY OF NATIVE HEART WITHOUT ANGINA PECTORIS: ICD-10-CM

## 2022-07-20 DIAGNOSIS — Z95.1 POSTSURGICAL AORTOCORONARY BYPASS STATUS: ICD-10-CM

## 2022-07-20 PROCEDURE — 93798 PHYS/QHP OP CAR RHAB W/ECG: CPT | Mod: S$GLB,,, | Performed by: INTERNAL MEDICINE

## 2022-07-20 PROCEDURE — 93798 PR CARDIAC REHAB/MONITOR: ICD-10-PCS | Mod: S$GLB,,, | Performed by: INTERNAL MEDICINE

## 2022-07-20 RX ORDER — NITROGLYCERIN 0.4 MG/1
0.4 TABLET SUBLINGUAL EVERY 5 MIN PRN
Qty: 25 TABLET | Refills: 6 | Status: SHIPPED | OUTPATIENT
Start: 2022-07-20 | End: 2024-02-26

## 2022-07-21 ENCOUNTER — DOCUMENTATION ONLY (OUTPATIENT)
Dept: CARDIAC REHAB | Facility: CLINIC | Age: 77
End: 2022-07-21
Payer: MEDICARE

## 2022-07-21 NOTE — PROGRESS NOTES
Miss Page has completed 7 out of 36 exercise session of Phase II cardiac rehab.  A follow up reassessment will be completed at 12 sessions.    Session: Orientation   Cardiac Rehab Individual Treatment Plan - Initial Assessment      Patient Name: Kaylee Romero MRN: [id number]   : 1945   Age: 77 y.o.   Primary Diagnosis: CABG  Date of Event: 19  EF: 60%  Risk Stratification: high  Referring Physician: MARGRET   Exercise Assessment:     CPX/TM Date: 22 Results   RHR 69   Max    Peak VO2 (CPX only) 9.2   Actual METS (CPX only) 2.63   Estimated METS 3.0     Anthropometrics    Height 62 inches   Weight 234 lbs   BMI 43.0   Abdominal Girth 48.5   Body Composition 30.2%     ST Depression noted on Stress Test?:No  Angina with exercise?: No   Fall Risk: Yes   Assistive Devices:  cane   Currently exercising? Yes: floor exercylce; Frequency: when her knee doesn't hurt; Duration 50 minutes throughout the day minutes  There were some limitations noted by the patient due to knee discomfort.      Exercise Plan:   Goals:  CR Exercise Goals: Attend Cardiac Rehab 3 times/week: In Progress  Home Aerobic Exercise: 2 additional days/week for 30-60 minutes: In Progress  Intensity of 12-15 on the Rate of Perceived Exertion (RPE) scale: In Progress  30% increase in entry estimated METS: 3.9 : In Progress  5 days/week for 30-60 minutes: In Progress    Intervention:   Discussed importance of regular attendance to cardiac rehab class    Exercise Prescription:  THR Range    Mode: Treadmill  Recumbent Bike  Upright Bike  Nustep  Elliptical   Frequency:  3 days/week   Duration:  30 - 60 minutes   Intensity:  12 - 15 RPE   Resistance Training:  Yes: 0 to 3 lb weights with 10-15 reps based on strength and range of motion assessment     Home Prescription:  Mode Aerobic   Frequency: 2- 3 days/week   Duration: 30-60 minutes   Resistance Training: None        Education:  Orientation to Equipment; verbalizes  understanding; Date: 22  Exercise Recommendations; verbalizes understanding; Date: 22  Exercise Safety; verbalizes understanding; Date: 22  Class Preparation: verbalizes understanding; Date: 22  Signs and symptoms to report: verbalizes understanding; Date: 22  Caffeine/Hydration: verbalizes understanding; Date: 22  Exercise Terminology: verbalizes understanding; Date: 22  Resistance Training: verbalizes understanding; Date: 22    Comments:  Miss Page was encouraged to continue using the exercycle at least 2 non-rehab days per week for at least 30 minutes in addition to attending Phase II cardiac rehab classes 3 days per week.  I suggested she try walking around her home as well.  She stated understanding.    All consent forms were signed, proper attire and shoes were discussed.       Miss Page will begin Cardiac Rehab on  at 2:15pm.    The exercise prescription will be adjusted based on tolerance of exercise intensity by patient.    Shan Junior, CEP    Orientation Cardiac Rehab Individual Treatment Plan - Initial Assessment      Patient Name: Kaylee Romero MRN: [id number]   : 1945   Age: 77 y.o.   Primary Diagnosis: s/p CABG, CAD, HTN, h/o MI, h/o TIA, DM    Nutrition Assessment:     Anthropometrics    Height 62 inches   Weight 234 lbs   BMI 43   Abdominal Girth 48.5   Body Composition 30.2       Drug Allergies and Intolerances:  Review of patient's allergies indicates:   Allergen Reactions    Bactrim [sulfamethoxazole-trimethoprim] Other (See Comments)     Generic version  Sulfa makes her sick    Keflex [cephalexin] Other (See Comments)     Turns orange       Food Allergies and Intolerances:  NA    Past Medical History:  Past Medical History:   Diagnosis Date    Acid reflux     Age-related osteoporosis without current pathological fracture 2019    Cataract     CKD (chronic kidney disease) stage 3, GFR 30-59 ml/min     Dry mouth      History of TIA (transient ischemic attack) 2018    Hyperlipidemia 2014    Hypertensive heart disease with diastolic heart failure 2012    Morbid obesity with body mass index (BMI) of 40.0 or higher 2016    KAMRAN (obstructive sleep apnea)     KAMRAN on CPAP 2016    Osteoporosis without current pathological fracture 2018    Physical deconditioning 2018    Status post total left knee replacement 2017    Type 2 diabetes mellitus with stage 3 chronic kidney disease, without long-term current use of insulin 2019       Past Surgical History:  Past Surgical History:   Procedure Laterality Date    ankle surgery (l)      APPENDECTOMY       SECTION      CORONARY ARTERY BYPASS GRAFT  09/2019    x 2    CORONARY ARTERY BYPASS GRAFT (CABG) N/A 2019    Procedure: CORONARY ARTERY BYPASS GRAFT (CABG)X3;  Surgeon: Peterson Ventura MD;  Location: [facility] OR [address] [place] [address];  Service: Cardiovascular;  Laterality: N/A;    CORONARY BYPASS GRAFT ANGIOGRAPHY  10/18/2021    Procedure: Bypass graft study;  Surgeon: Jamar Haddad MD;  Location: [facility] CATH LAB;  Service: Cardiology;;    DILATION AND CURETTAGE OF UTERUS      ENDOSCOPIC HARVEST OF VEIN Left 2019    Procedure: SURGICAL PROCUREMENT, VEIN, ENDOSCOPIC;  Surgeon: Peterson Ventura MD;  Location: [facility] OR [address] [place] [address];  Service: Cardiovascular;  Laterality: Left;  Vein [place] Start: 0843; End: 1054   Vein Prep Start: 1055; End: 1145    KNEE ARTHROSCOPY W/ DEBRIDEMENT      KNEE SURGERY Left 2017    TKR    LEFT HEART CATHETERIZATION Left 2019    Procedure: Left heart cath;  Surgeon: Ronak Gibbons MD;  Location: [facility] CATH LAB;  Service: Cardiology;  Laterality: Left;    LEFT HEART CATHETERIZATION Left 10/18/2021    Procedure: Left heart cath;  Surgeon: Jamar Haddad MD;  Location: [facility] CATH LAB;  Service: Cardiology;  Laterality: Left;       Medications:  Current Outpatient  "Medications   Medication Sig    acetaminophen (TYLENOL) 500 MG tablet Take 500 mg by mouth 2 (two) times daily as needed for Pain.    alendronate (FOSAMAX) 70 MG tablet TAKE 1 TABLET(70 MG) BY MOUTH EVERY 7 DAYS (Patient taking differently: Take 70 mg by mouth every Sunday.)    amLODIPine (NORVASC) 10 MG tablet TAKE 1 TABLET(10 MG) BY MOUTH EVERY DAY    aspirin 81 MG Chew Take 1 tablet (81 mg total) by mouth once daily.    atorvastatin (LIPITOR) 80 MG tablet TAKE 1 TABLET(80 MG) BY MOUTH EVERY DAY    BD BOO 2ND GEN PEN NEEDLE 32 gauge x 5/32" Ndle USE EVERY DAY    blood sugar diagnostic Strp 1 strip by Misc.(Non-Drug; Combo Route) route once daily.    carvediloL (COREG) 25 MG tablet TAKE 1 TABLET(25 MG) BY MOUTH TWICE DAILY WITH MEALS    clopidogreL (PLAVIX) 75 mg tablet Take 1 tablet (75 mg total) by mouth once daily.    COD LIVER OIL ORAL TAKE 2 CAPSULES BY MOUTH EVERY MORNING AND 1 CAPSULE EVERY EVENING    diclofenac sodium (VOLTAREN) 1 % Gel Apply 2 g topically 4 (four) times daily as needed. To painful area on the feet. (Patient taking differently: Apply 2 g topically 4 (four) times daily as needed. To painful area on the feet and knee)    diphenoxylate-atropine 2.5-0.025 mg (LOMOTIL) 2.5-0.025 mg per tablet TAKE 1 TABLET BY MOUTH FOUR TIMES DAILY AS NEEDED FOR DIARRHEA    donepeziL (ARICEPT) 5 MG tablet TAKE 1 TABLET(5 MG) BY MOUTH EVERY EVENING FOR MEMORY    dulaglutide (TRULICITY) 4.5 mg/0.5 mL pen injector Inject 4.5 mg into the skin every 7 days. (Patient taking differently: Inject 4.5 mg into the skin every Sunday.)    FEROSUL 325 mg (65 mg iron) Tab tablet TAKE 1 TABLET BY MOUTH DAILY    furosemide (LASIX) 20 MG tablet TAKE 1 TABLET(20 MG) BY MOUTH EVERY DAY (Patient taking differently: Take 20 mg by mouth every Sat, Sun, Mon, Wed, & Fri)    glipiZIDE (GLUCOTROL) 5 MG tablet Take 2 tablets (10 mg total) by mouth 2 (two) times daily with meals. TAKE 2 TABLETS BY MOUTH DAILY WITH DINNER " OR EVENING MEAL (Patient taking differently: Take 10 mg by mouth 2 (two) times daily with meals.)    glucagon (GVOKE HYPOPEN 2-PACK) 1 mg/0.2 mL AtIn Inject 1 Package into the skin as needed.    insulin glargine,hum.rec.anlog (BASAGLAR KWIKPEN U-100 INSULIN SUBQ) Inject 22 Units into the skin every evening.    irbesartan (AVAPRO) 300 MG tablet Take 1 tablet (300 mg total) by mouth every evening.    isosorbide mononitrate (IMDUR) 30 MG 24 hr tablet Take 2 tablets (60 mg total) by mouth once daily.    lancets Misc 1 lancet by Misc.(Non-Drug; Combo Route) route once daily.    multivitamin (THERAGRAN) per tablet Take 1 tablet by mouth once daily.    nitroGLYCERIN (NITROSTAT) 0.4 MG SL tablet Place 1 tablet (0.4 mg total) under the tongue every 5 (five) minutes as needed for Chest pain.    vitamin D (VITAMIN D3) 1000 units Tab Take 1,000 Units by mouth twice a week. Monday AND THURSDAYS ONLY     Current Facility-Administered Medications   Medication    hyaluronate sodium, stabilized (MONOVISC) Syrg       Vitamins and Supplements:  MVI, Vit D3    Labs:  Patient confirms she is taking lipitor 80mg for cholesterol control.    Lab Results   Component Value Date    CHOL 84 (L) 06/27/2022     Lab Results   Component Value Date    HDL 33 (L) 06/27/2022     Lab Results   Component Value Date    LDLCALC 38.2 (L) 06/27/2022     Lab Results   Component Value Date    TRIG 64 06/27/2022     Lab Results   Component Value Date    CHOLHDL 39.3 06/27/2022         Lab Results   Component Value Date    GLUF 189 (H) 06/23/2022     Lab Results   Component Value Date    HGBA1C 6.4 (H) 06/23/2022       Nutrition/Diet History:  Patient eats 1 meal daily.    Seasons food with [employer] occasionally.  Patient denies use of a salt shaker at the table on prepared foods.   Dines out 1 per week at restaurants such as [employer].    Chooses fried foods rarely  Chooses fish rarely  Beverages:  water  Alcohol: none    24 Hour  Recall:  · Breakfast: part of omelette with peppers/cheese/beef with crawfish, orange juice  · Lunch:none   · Dinner roasted chicken sandwich  · Other: NA    Difficulty Chewing or Swallowing: no  Current Exercise: See Exercise Physiologist Note  Food Safety/Food Preparation: shares with spouse  Living Arrangements/Family Support: Lives with spouse  Cultural/Spiritual/Personal Preferences: not applicable   Barriers to Education: none identified  Stage of Change Related to Diet Habits: Action    Nutrition Diagnosis:  1. Food and nutrition related knowledge deficit related to the lack of prior nutrition education as evidenced by diet history and 24 hour recall    Nutrition Plan:   Goals:  LDL-C < 70 (for high risk patients)  Hgb A1c < 7%  BMI < 25 and abdominal girth < 40M/<35 F  2 gram sodium, Mediterranean diet  Rehab weight goal: 10lbs  Fish intake (non-fried varieties) to a goal of 2-3 servings per week.   Increase fruit and vegetable intake    Interventions/Recommendations:  Lab results reviewed and discussed  Nutrition Prescription:  · Total Energy Estimated Needs: 2072-2295 Kcal/d for weight loss  · Method for Estimating Needs: 20-25kcal/kg ABW  · Total Protein Estimated Needs: 51-77 g/d  · Method for Estimating Needs: 0.8-1.2 g/Kg ABW  · Total Fluid Estimated Needs: 1 mL/Kcal  Dietitian Consult: No  Patient to participate in Cardiac Rehab sessions three times a week  Weekly Dietitian Weight Check  Encouraged patient to complete 3 day food diary  Follow Up Plan for Ongoing Self-Management Support    Education:  Mediterranean Diet; verbalizes understanding; Date: 6/30/22   Person taught: patient    Preferred Learning Method: Verbal and written   Education Needed/Provided: Nutrition counseling and education related to cardiac rehabilitation   Education Method: Weekly nutrition lectures on the Mediterranean diet, cooking, shopping, and dining out   Written Materials Provided: 3 Day Food Record, Introduction to  Mediterranean Diet   Strategies Implemented: Motivational interviewing, Goal setting, Self-Monitoring, and Problem Solving    Comments:   Discussed ways to incorporate healthy snacks, eating on a schedule, and monitoring sodium intake for heart health.  Discussed improving produce intake and fish intake    Diabetes  Is the patient diabetic?   Yes   Other Core Components/Diabetes Assessment:   Labs:  Lab Results   Component Value Date    GLUF 189 (H) 06/23/2022    GLUF 121 (H) 12/11/2019    GLUF 106 04/07/2011     Lab Results   Component Value Date    HGBA1C 6.4 (H) 06/23/2022    HGBA1C 6.0 (H) 05/04/2022    HGBA1C 6.3 (H) 01/06/2022      Lab Results   Component Value Date    ESTIMATEDAVG 137 (H) 06/23/2022    ESTIMATEDAVG 126 05/04/2022    ESTIMATEDAVG 134 (H) 01/06/2022       History of diabetes since 2013  Diabetes medications: Trulicity 4.5mg injection weekly, Basaglar insulin 22u @[facility], Glipizide 10mg BID  Blood Glucose Checks at Home: Yes: Other: 3x weekly  Typical morning range: 110-140 mg/dl  Endocrinologist or PCP following DM: Dr. Fox endocrinology    Other Core Components/Diabetes Plan:   Goals:  Hgb A1c < 7%  Exercise Blood Glucose: 100-300 mg/dl    Interventions:  Reviewed and Discussed Labs  Medication Compliance  Med Card Reconciled  Low Sodium, Mediterranean, ADA Diet  Weight Management  Physical Activity  Increase Knowledge of Contributory Factors  Home Monitoring    Education:  Mediterranean Diet; verbalizes understanding; Date: 6/30/22    Comments:   Patient verbalizes understanding to bring home glucometer and check glucose pre and post each exercise session.  Per cardiac rehab protocols, patient's glucose must be between 90 and 270 mg/dL to exercise.  Patient denies any recent glucose levels less than 60 mg/dL or greater than 300 mg/dL. Patient verbalizes importance of notifying rehab staff if symptoms of hypoglycemia occur while at cardiac rehab.  Abnormal labs will be reported to patient's  PCP/Endocrinologist by rehab staff.    Noted updated glucose parameters of 100-300    Emphasized importance of monitoring blood sugars daily  RD contact information provided.      Keila Mccarty MS, RDN/LDN    Session: Orientation Cardiac Rehab Individual Treatment Plan - Initial Assessment      Patient Name: Kaylee Romero MRN: [id number]   : 1945   Age: 77 y.o.   Date of Event: 2019   Primary Diagnosis: CABG   EF: 60%    Physical Assessment:   LMP 2001 (Approximate)     ASSESSMENT:  Heart Sounds: regular rate and rhythm  Prosthetic Valve: No  Lung Sounds: normal air entry, lungs clear to auscultation  Capillary Refill: normal  Left Radial Pulse: Normal (+2)  Right Radial Pulse: Normal (+2)  Left Pedal Pulse: Normal (+2)  Right Pedal Pulse: Normal (+2)  Right Edema: Trace  Left Edema Trace  Strength: normal  Range of Motion: full range of motion  Existing Limitations:      Site   [x] Arthritis, bursitis    [] Amputation, atrophy    [x] Other: Right knee & left shoulder   []       Diabetic patient's foot examination comments: Normal -  Bilateral.  Patient's feet are dry & scaly.  Patient states that she tries to apply lotion every day, but didn't today.  Incisional site: N/A  Special needs: Right knee issues & left shoulder.    Psychosocial Assessment:   Outcome Survey Tools:    LOLA SCORES:   PRE   Anxiety 1   Depression 1   Somatic 6   Hostility 0     SF-36 SCORES:   PRE   Physical Function 12   Social Function 11   Mental Health 30   Pain 4   Change in Health 3   Physical Role Limitation 0   Mental Role Limitation 3   Energy/Fatigue 10   Health Perceptions 12   Total Score 85     PHQ-9:  PHQ-9 Depression Patient Health Questionnaire 2022   Over the last two weeks how often have you been bothered by little interest or pleasure in doing things 2   Over the last two weeks how often have you been bothered by feeling down, depressed or hopeless 0   Over the last two weeks how often have you  been bothered by trouble falling or staying asleep, or sleeping too much 0   Over the last two weeks how often have you been bothered by feeling tired or having little energy 3   Over the last two weeks how often have you been bothered by a poor appetite or overeating 0   Over the last two weeks how often have you been bothered by feeling bad about yourself - or that you are a failure or have let yourself or your family down 0   Over the last two weeks how often have you been bothered by trouble concentrating on things, such as reading the newspaper or watching television 0   Over the last two weeks how often have you been bothered by moving or speaking so slowly that other people could have noticed. 2   Over the last two weeks how often have you been bothered by thoughts that you would be better off dead, or of hurting yourself 0   If you checked off any problems, how difficult have these problems made it for you to do your work, take care of things at home or get along with other people? Very difficult   Total Score 7              Living Arrangements: Lives with spouse  Family Support: spouse  Self Reported: Effective Coping Skills  Displays: happiness and calmness  Medication: not applicable    Psychosocial Plan:   Goals:  Reduce manifestation of chronic pain  Maintain positive support system  Maintain positive outlook  Improve overall quality of life    Interventions/Recommendations:  Discussed Results of Surveys  Patient to Self Report Emotional Changes at Session Check In  Recommend Physical Activity  Recommend Attending Education Lectures  Notify MD: No  Program Referral: No  Pharmaceutical Intervention/Therapy: No  Other Needs: not applicable  Stage of Readiness to Change: Action    Education:  Stress management, verbalizes understanding; Date:6/30/2022  Stress, verbalizes understanding; Date:6/30/2022    Comments:  Patient reports to have no stress at all.  She is retired & states that her  is very  supportive.  She has been instructed to notify staff in the event that circumstances change.  Patient verbalizes understanding.    Other Core Components/Risk Factors Assessment:   RISK FACTORS:  diabetes, hyperlipidemia, hypertension, obesity, positive family history, sedentary lifestyle    Learning Barriers: None and Physical Condition    Education Level:  Post-College Graduate Degree    Pre-test Score: 50%    Medication Compliance: has been compliant with taking medications    Other Core Components/Risk Factors Plan:   Goals:  Increase knowledge of CAD: In Progress  Weight loss: In Progress  Control diabetes by adjusting diet and exercise: In Progress  Learn more about healthy eating: In Progress    Interventions/Recommendations:  Recommend regular attendance for Cardiac Rehab: Exercise and Education Lectures  Encourage medication compliance  Individual Education/ Counseling: Yes  Physician Referral: No    Education:    cholesterol, verbalizes understanding; Date: 6/30/2022  diabetes, verbalizes understanding; Date: 6/30/2022  fluid overload/CHF, verbalizes understanding; Date: 6/30/2022  hypertension, verbalizes understanding; Date: 6/30/2022  risk factors, verbalizes understanding; Date: 6/30/2022        Education method adapted to patients education level and preferred method of learning.  Method: explanation    Comments:  Patient will be working to decrease risk factors for CAD.    Other Core Components/Hypertension Assessment:   Resting BP: 112/68  BP Readings from Last 1 Encounters:   07/18/22 121/68     BP Diagnosis: Hypertensive  Patient reported symptoms: none    Other Core Components/Hypertension Plan:   Goals:  Blood Pressure <130/80    Interventions/Recommendations:  Med Card Reconciled: Yes  Encourage medication compliance  Encourage sodium reduction  Encourage weight loss  Recommend physical activity  Educate on contributory factors  Encourage home blood pressure monitoring  Recommend daily  weights    Education:    Hypertension; verbalizes understanding; Date: 6/30/2022  Coronary Artery Disease; verbalizes understanding; Date: 6/30/2022  Risk Factors; verbalizes understanding; Date: 6/30/2022        Comments:  Patient reports that she monitors BP a few days per week.  Encouraged for her to continue.      Does the patient have Heart Failure? No    Other Core Components/Tobacco Cessation Assessment:   Smoking Status: lifetime non-smoker  Smoking Cessation Barriers: N/A  Stage of Readiness to Change: Maintenance    Other Core Components/Tobacco Cessation Plan:   Goals:  Maintain non-smoking status    Interventions:  Maintains non-smoking status    Education:    Risk Factors; verbalizes understanding; Date: 6/30/2022        Comments:  Non smoker.    Discussed Cardiac Rehab program in depth with patient.  Medication list updated per patient & marked as reviewed.  Patient has been instructed to notify staff of any problems while attending rehab (ie: chest pain, shortness of breath, lightheadedness, dizziness).  Patient has been instructed to monitor blood pressure readings outside of rehab & to keep a daily log of the readings.  Patient verbalizes understanding.    Roxana Avalos RN  Cardiac Rehab Nurse

## 2022-07-22 ENCOUNTER — HOSPITAL ENCOUNTER (OUTPATIENT)
Dept: CARDIOLOGY | Facility: HOSPITAL | Age: 77
Discharge: HOME | End: 2022-07-22
Attending: INTERNAL MEDICINE
Payer: MEDICARE

## 2022-07-22 VITALS
DIASTOLIC BLOOD PRESSURE: 70 MMHG | HEART RATE: 83 BPM | HEIGHT: 62 IN | WEIGHT: 235 LBS | SYSTOLIC BLOOD PRESSURE: 124 MMHG | BODY MASS INDEX: 43.24 KG/M2

## 2022-07-22 DIAGNOSIS — I25.118 CORONARY ARTERY DISEASE OF NATIVE ARTERY OF NATIVE HEART WITH STABLE ANGINA PECTORIS: ICD-10-CM

## 2022-07-22 LAB
ASCENDING AORTA: 2.9 CM
AV INDEX (PROSTH): 0.63
AV MEAN GRADIENT: 11 MMHG
AV PEAK GRADIENT: 17 MMHG
AV VALVE AREA: 2.18 CM2
AV VELOCITY RATIO: 0.6
BSA FOR ECHO PROCEDURE: 2.16 M2
CV ECHO LV RWT: 0.47 CM
DOP CALC AO PEAK VEL: 2.04 M/S
DOP CALC AO VTI: 43.36 CM
DOP CALC LVOT AREA: 3.5 CM2
DOP CALC LVOT DIAMETER: 2.1 CM
DOP CALC LVOT PEAK VEL: 1.23 M/S
DOP CALC LVOT STROKE VOLUME: 94.65 CM3
DOP CALC MV VTI: 25.67 CM
DOP CALCLVOT PEAK VEL VTI: 27.34 CM
E WAVE DECELERATION TIME: 167.39 MSEC
E/A RATIO: 1.06
E/E' RATIO: 14.35 M/S
ECHO LV POSTERIOR WALL: 0.99 CM (ref 0.6–1.1)
EJECTION FRACTION: 68 %
FRACTIONAL SHORTENING: 35 % (ref 28–44)
INTERVENTRICULAR SEPTUM: 1 CM (ref 0.6–1.1)
LA MAJOR: 4.39 CM
LA MINOR: 4.49 CM
LA WIDTH: 3.39 CM
LEFT ATRIUM SIZE: 4.24 CM
LEFT ATRIUM VOLUME INDEX MOD: 26.4 ML/M2
LEFT ATRIUM VOLUME INDEX: 26.5 ML/M2
LEFT ATRIUM VOLUME MOD: 54.09 CM3
LEFT ATRIUM VOLUME: 54.24 CM3
LEFT INTERNAL DIMENSION IN SYSTOLE: 2.73 CM (ref 2.1–4)
LEFT VENTRICLE DIASTOLIC VOLUME INDEX: 37.99 ML/M2
LEFT VENTRICLE DIASTOLIC VOLUME: 77.87 ML
LEFT VENTRICLE MASS INDEX: 66 G/M2
LEFT VENTRICLE SYSTOLIC VOLUME INDEX: 13.6 ML/M2
LEFT VENTRICLE SYSTOLIC VOLUME: 27.83 ML
LEFT VENTRICULAR INTERNAL DIMENSION IN DIASTOLE: 4.18 CM (ref 3.5–6)
LEFT VENTRICULAR MASS: 135.26 G
LV LATERAL E/E' RATIO: 13.56 M/S
LV SEPTAL E/E' RATIO: 15.25 M/S
MV A" WAVE DURATION": 7.71 MSEC
MV MEAN GRADIENT: 1 MMHG
MV PEAK A VEL: 1.15 M/S
MV PEAK E VEL: 1.22 M/S
MV PEAK GRADIENT: 5 MMHG
MV STENOSIS PRESSURE HALF TIME: 48.54 MS
MV VALVE AREA BY CONTINUITY EQUATION: 3.69 CM2
MV VALVE AREA P 1/2 METHOD: 4.53 CM2
PISA TR MAX VEL: 2.18 M/S
PULM VEIN S/D RATIO: 1.04
PV PEAK D VEL: 0.57 M/S
PV PEAK S VEL: 0.59 M/S
RA MAJOR: 4.26 CM
RA PRESSURE: 8 MMHG
RA WIDTH: 2.98 CM
RIGHT VENTRICULAR END-DIASTOLIC DIMENSION: 3.25 CM
RV TISSUE DOPPLER FREE WALL SYSTOLIC VELOCITY 1 (APICAL 4 CHAMBER VIEW): 7.97 CM/S
SINUS: 2.48 CM
STJ: 2.74 CM
TDI LATERAL: 0.09 M/S
TDI SEPTAL: 0.08 M/S
TDI: 0.09 M/S
TR MAX PG: 19 MMHG
TRICUSPID ANNULAR PLANE SYSTOLIC EXCURSION: 1.73 CM
TV REST PULMONARY ARTERY PRESSURE: 27 MMHG

## 2022-07-22 PROCEDURE — 93306 TTE W/DOPPLER COMPLETE: CPT

## 2022-07-22 PROCEDURE — 93306 TTE W/DOPPLER COMPLETE: CPT | Mod: 26,,, | Performed by: INTERNAL MEDICINE

## 2022-07-22 PROCEDURE — 93306 ECHO (CUPID ONLY): ICD-10-PCS | Mod: 26,,, | Performed by: INTERNAL MEDICINE

## 2022-07-24 DIAGNOSIS — I25.118 CORONARY ARTERY DISEASE OF NATIVE ARTERY OF NATIVE HEART WITH STABLE ANGINA PECTORIS: ICD-10-CM

## 2022-07-24 NOTE — TELEPHONE ENCOUNTER
No new care gaps identified.  [facility] Embedded Care Gaps. Reference number: [id number]. 7/24/2022   3:12:49 PM CDT

## 2022-07-25 ENCOUNTER — CLINICAL SUPPORT (OUTPATIENT)
Dept: CARDIAC REHAB | Facility: CLINIC | Age: 77
End: 2022-07-25
Payer: MEDICARE

## 2022-07-25 DIAGNOSIS — I25.10 ATHEROSCLEROSIS OF NATIVE CORONARY ARTERY OF NATIVE HEART WITHOUT ANGINA PECTORIS: ICD-10-CM

## 2022-07-25 DIAGNOSIS — Z95.1 POSTSURGICAL AORTOCORONARY BYPASS STATUS: ICD-10-CM

## 2022-07-25 PROCEDURE — 93798 PHYS/QHP OP CAR RHAB W/ECG: CPT | Mod: S$GLB,,, | Performed by: INTERNAL MEDICINE

## 2022-07-25 PROCEDURE — 93798 PR CARDIAC REHAB/MONITOR: ICD-10-PCS | Mod: S$GLB,,, | Performed by: INTERNAL MEDICINE

## 2022-07-26 RX ORDER — CLOPIDOGREL BISULFATE 75 MG/1
TABLET ORAL
Qty: 90 TABLET | Refills: 3 | Status: SHIPPED | OUTPATIENT
Start: 2022-07-26 | End: 2023-07-31

## 2022-07-26 NOTE — TELEPHONE ENCOUNTER
Refill Decision Note   Kaylee Romero  is requesting a refill authorization.  Brief Assessment and Rationale for Refill:  Approve     Medication Therapy Plan:       Medication Reconciliation Completed: No   Comments:     No Care Gaps recommended.     Note composed:9:44 AM 07/26/2022

## 2022-07-27 ENCOUNTER — CLINICAL SUPPORT (OUTPATIENT)
Dept: CARDIAC REHAB | Facility: CLINIC | Age: 77
End: 2022-07-27
Payer: MEDICARE

## 2022-07-27 DIAGNOSIS — I25.10 CORONARY ARTERY DISEASE, UNSPECIFIED VESSEL OR LESION TYPE, UNSPECIFIED WHETHER ANGINA PRESENT, UNSPECIFIED WHETHER NATIVE OR TRANSPLANTED HEART: ICD-10-CM

## 2022-07-27 DIAGNOSIS — Z95.1 POSTSURGICAL AORTOCORONARY BYPASS STATUS: ICD-10-CM

## 2022-07-27 PROCEDURE — 93798 PR CARDIAC REHAB/MONITOR: ICD-10-PCS | Mod: S$GLB,,, | Performed by: INTERNAL MEDICINE

## 2022-07-27 PROCEDURE — 93798 PHYS/QHP OP CAR RHAB W/ECG: CPT | Mod: S$GLB,,, | Performed by: INTERNAL MEDICINE

## 2022-07-29 ENCOUNTER — CLINICAL SUPPORT (OUTPATIENT)
Dept: CARDIAC REHAB | Facility: CLINIC | Age: 77
End: 2022-07-29
Payer: MEDICARE

## 2022-07-29 DIAGNOSIS — I25.10 CORONARY ATHEROSCLEROSIS OF NATIVE CORONARY ARTERY: ICD-10-CM

## 2022-07-29 DIAGNOSIS — Z95.1 POSTSURGICAL AORTOCORONARY BYPASS STATUS: ICD-10-CM

## 2022-07-29 PROCEDURE — 93798 PHYS/QHP OP CAR RHAB W/ECG: CPT | Mod: S$GLB,,, | Performed by: INTERNAL MEDICINE

## 2022-07-29 PROCEDURE — 93798 PR CARDIAC REHAB/MONITOR: ICD-10-PCS | Mod: S$GLB,,, | Performed by: INTERNAL MEDICINE

## 2022-07-29 NOTE — PROGRESS NOTES
Pt arrived for Phase II cardiac rehab class.  Pt reports to have eaten something prior to exercise session.  The patient tolerated exercise session well with no complaints.

## 2022-07-31 ENCOUNTER — PATIENT MESSAGE (OUTPATIENT)
Dept: OTHER | Facility: OTHER | Age: 77
End: 2022-07-31
Payer: MEDICARE

## 2022-08-01 ENCOUNTER — CLINICAL SUPPORT (OUTPATIENT)
Dept: CARDIAC REHAB | Facility: CLINIC | Age: 77
End: 2022-08-01
Payer: MEDICARE

## 2022-08-01 DIAGNOSIS — Z95.1 POSTSURGICAL AORTOCORONARY BYPASS STATUS: ICD-10-CM

## 2022-08-01 DIAGNOSIS — I25.10 CORONARY ARTERY DISEASE, UNSPECIFIED VESSEL OR LESION TYPE, UNSPECIFIED WHETHER ANGINA PRESENT, UNSPECIFIED WHETHER NATIVE OR TRANSPLANTED HEART: ICD-10-CM

## 2022-08-01 PROCEDURE — 93798 PHYS/QHP OP CAR RHAB W/ECG: CPT | Mod: S$GLB,,, | Performed by: INTERNAL MEDICINE

## 2022-08-01 PROCEDURE — 93798 PR CARDIAC REHAB/MONITOR: ICD-10-PCS | Mod: S$GLB,,, | Performed by: INTERNAL MEDICINE

## 2022-08-02 ENCOUNTER — TELEPHONE (OUTPATIENT)
Dept: PODIATRY | Facility: CLINIC | Age: 77
End: 2022-08-02
Payer: MEDICARE

## 2022-08-02 ENCOUNTER — DOCUMENTATION ONLY (OUTPATIENT)
Dept: CARDIAC REHAB | Facility: CLINIC | Age: 77
End: 2022-08-02
Payer: MEDICARE

## 2022-08-02 NOTE — TELEPHONE ENCOUNTER
Called patient and left message for patient to call office to reschedule 8-9-22 appointment with Dr Gillis Patient was instructed to call  [phone] to reschedule.

## 2022-08-02 NOTE — PROGRESS NOTES
Miss Page has completed 12 out of 36 exercise session of Phase II cardiac rehab.  A follow up reassessment will be completed at 24 sessions.    12 Session Follow Up   Cardiac Rehab Individual Treatment Plan - Reassessment      Patient Name: Kaylee Romero MRN: [id number]   : 1945   Age: 77 y.o.   Primary Diagnosis: CABG Date of Event: 19   EF: 60%  Risk Stratification: high  Referring Physician: MARGRET   Exercise Assessment:     Angina with exercise?: No   ST Depression with Exercise?: No  Fall Risk: Yes   Assistive Devices:  cane  There were some limitations noted by the patient due to chronic knee pain.  Comments on Progression: Miss Page is progressing well and her exercise workloads will continue to be increased as she tolerates exercise intensity.    Exercise Plan:   Goals:  CR Exercise Goals: Attend Cardiac Rehab 3 times/week: In Progress  Home Aerobic Exercise: 2 additional days/week for 30-60 minutes: In Progress  Intensity of 12-15 on the Rate of Perceived Exertion (RPE) scale: In Progress  30% increase in entry estimated METS: 3.9 : In Progress  5 days/week for 30-60 minutes: In Progress    Comments on Goal Progression:  Patient Consistency: consistent with attendance  Home exercise? Yes: floor pedal cycler; Frequency: 3 non-rehab days per week; Duration 50 minutes  Patient reports intensity rate 11-15 on RPE scale    Intervention/Recommendations:   Discussed importance of regular attendance to cardiac rehab class    Exercise Prescription:  THR Range    Mode: Nustep   Frequency:  3 days/week   Duration:  30-60 minutes   Intensity:  12-15 RPE   Resistance Training:  No: 0 lb weights with 10-15 reps based on strength and range of motion and adjusted accordingly     Home Prescription:  Mode Aerobic exercise   Frequency: 2- 3 days/week   Duration: 30-60 minutes   Resistance Training: None     Education:  Flexibility/Stretching; verbalizes understanding; Date: 22  Resistance Training;  verbalizes understanding; Date: 7-11-22  What Comes After Phase II?; verbalizes understanding; Date: 8-1-22    Comments:  I reviewed exercise recommendations with Miss Page.  I encouraged her to continue exercising but to try walking more.  She stated understanding.     The exercise prescription will continue to be adjusted based on tolerance of exercise intensity by patient.    Taina Junior., CEP

## 2022-08-02 NOTE — PROGRESS NOTES
"12 Session Follow Up   Cardiac Rehab Individual Treatment Plan - Reassessment    Patient Name: Kaylee Romero MRN: [id number]   : 1945   Age: 77 y.o.   Primary Diagnosis: s/p CABG, CAD, HTN, h/o MI, h/o TIA, DM    Nutrition Assessment:     Anthropometrics    Height 62 inches   Weight 235 lbs   BMI 43     Patient confirms she is taking lipitor 80mg for cholesterol control.  Difficulty Chewing or Swallowing: no  Current Exercise: See Exercise Physiologist Note  Food Safety/Food Preparation: spouse  Living Arrangements/Family Support: Lives with spouse  Cultural/Spiritual/Personal Preferences: not applicable   Barriers to Education: personal beliefs  Stage of Change Related to Diet Habits: Preparation  Recent Changes to Diet: No  Food Diary: Given      Nutrition Plan:   Goals:  LDL-C < 70 (for high risk patients)  Hgb A1c < 7%  BMI < 25 and abdominal girth < 40M/<35 F  2 gram sodium, Mediterranean diet: In Progress  Rehab weight loss goal of 10 lbs, 1 lb per week: In Progress  Fish intake (non-fried varieties) to a goal of 2-3 servings per week: In Progress  Increase fruit and vegetable intake: In Progress    Comments on Goal Progression:  Pt believes she has no issues with diet choices and portions despite no change in weight since starting program- she believes this is r/t "eating regular meals r/t increased appetite r/t exercise". Attempted to discuss food choices/portions and pt abruptly stopped RD stating "those things have never been a problem for me"    Interventions:  Dietitian Consult: No  Patient to participate in Cardiac Rehab sessions three times a week  Weekly Dietitian Weight Check  Nutrition Recommendations Provided: Verbal, Reviewed  Follow Up Plan for Ongoing Self-Management Support    Education:  Protein; verbalizes understanding; Date: 22  Seafood; verbalizes understanding; Date: 22  Sodium; verbalizes understanding; Date: 22  Pre/Probiotics; verbalizes understanding; Date: " 7/6/22    Comments:   Discussed ways to incorporate healthy snacks, eating on a schedule, and monitoring sodium intake for heart health.    Diabetes  Is the patient diabetic?   Yes   Other Core Components/Diabetes Assessment:   Labs:  Lab Results   Component Value Date    GLUF 189 (H) 06/23/2022    GLUF 121 (H) 12/11/2019    GLUF 106 04/07/2011     Lab Results   Component Value Date    HGBA1C 6.4 (H) 06/23/2022    HGBA1C 6.0 (H) 05/04/2022    HGBA1C 6.3 (H) 01/06/2022      Lab Results   Component Value Date    ESTIMATEDAVG 137 (H) 06/23/2022    ESTIMATEDAVG 126 05/04/2022    ESTIMATEDAVG 134 (H) 01/06/2022       History of diabetes since 2013  Diabetes medications: Basaglar 22u @[facility], Trulicity injection 4.5mg weekly, Glipizide 10mg BID  Blood Glucose Checks at Home: Yes: Other: daily  Typical morning range: 110-130 mg/dl  Endocrinologist or PCP following DM: Dr. Fox endocrinology    Other Core Components/Diabetes Plan:   Goals:  Hgb A1c < 7%  Exercise Blood Glucose: 100-300 mg/dl    Interventions:  Medication Compliance  Med Card Reconciled  Low Sodium, Mediterranean, ADA Diet  Weight Management  Physical Activity  Increase Knowledge of Contributory Factors  Home Monitoring      Comments:   Patient verbalizes understanding to bring home glucometer and check glucose pre and post each exercise session.  Per cardiac rehab protocols, patient's glucose must be between 90 and 270 mg/dL to exercise.  Patient denies any recent glucose levels less than 60 mg/dL or greater than 300 mg/dL. Patient verbalizes importance of notifying rehab staff if symptoms of hypoglycemia occur while at cardiac rehab.  Abnormal labs will be reported to patient's PCP/Endocrinologist by rehab staff.    Noted updated glucose parameters of 100-300    Emphasized importance of pt checking glucose more regularly, especially prior to taking HS insulin    Keila Mccarty MS, RDN/LDN

## 2022-08-03 ENCOUNTER — CLINICAL SUPPORT (OUTPATIENT)
Dept: CARDIAC REHAB | Facility: CLINIC | Age: 77
End: 2022-08-03
Payer: MEDICARE

## 2022-08-03 DIAGNOSIS — Z95.1 POSTSURGICAL AORTOCORONARY BYPASS STATUS: ICD-10-CM

## 2022-08-03 DIAGNOSIS — I25.10 CORONARY ARTERY DISEASE INVOLVING NATIVE CORONARY ARTERY OF NATIVE HEART WITHOUT ANGINA PECTORIS: ICD-10-CM

## 2022-08-03 PROCEDURE — 93798 PHYS/QHP OP CAR RHAB W/ECG: CPT | Mod: S$GLB,,, | Performed by: INTERNAL MEDICINE

## 2022-08-03 PROCEDURE — 93798 PR CARDIAC REHAB/MONITOR: ICD-10-PCS | Mod: S$GLB,,, | Performed by: INTERNAL MEDICINE

## 2022-08-05 ENCOUNTER — CLINICAL SUPPORT (OUTPATIENT)
Dept: CARDIAC REHAB | Facility: CLINIC | Age: 77
End: 2022-08-05
Payer: MEDICARE

## 2022-08-05 DIAGNOSIS — I25.10 ATHEROSCLEROSIS OF NATIVE CORONARY ARTERY OF NATIVE HEART, UNSPECIFIED WHETHER ANGINA PRESENT: ICD-10-CM

## 2022-08-05 DIAGNOSIS — Z95.1 POSTSURGICAL AORTOCORONARY BYPASS STATUS: ICD-10-CM

## 2022-08-05 PROCEDURE — 93798 PR CARDIAC REHAB/MONITOR: ICD-10-PCS | Mod: S$GLB,,,

## 2022-08-05 PROCEDURE — 93798 PHYS/QHP OP CAR RHAB W/ECG: CPT | Mod: S$GLB,,,

## 2022-08-05 NOTE — PROGRESS NOTES
Session: 12 Session Follow Up Cardiac Rehab Individual Treatment Plan - Reassessment      Patient Name: Kaylee Romero MRN: [id number]   : 1945   Age: 77 y.o.   Primary Diagnosis: z95.1    Psychosocial Assessment:   Living Arrangements: Lives with spouse, son  Family Support: spouse  Self Reported: no change Effective Coping Skills  Displays: calmness  Medication: not applicable    Psychosocial Plan:   Goals:  Verbalizes coping mechanisms: In Progress  Maintain positive support system: Met  Maintain positive outlook: Met  Improve overall quality of life: In Progress    Comments on Goal Progression:  Pt is attending regularly and maintains positive outlook.    Interventions:  Patient to Self Report Emotional Changes at Session Check In  Recommend Physical Activity  Recommend Attending Education Lectures  Notify MD: No  Program Referral: No  Pharmaceutical Intervention/Therapy: No  Other Needs: not applicable  Stage of Readiness to Change: Action    Education:  Coping techniques, verbalizes understanding; Date:7/15/2022    Comments:  Pt denies any overwhelming stress or anxiety.  Patient has been instructed to notify staff in the event that circumstances worsen.  Patient verbalizes understanding.    Other Core Components/Risk Factors Assessment:   RISK FACTORS:  diabetes, hyperlipidemia, hypertension, obesity, positive family history, sedentary lifestyle    Learning Barriers: Physical Condition    Medication Compliance: has been compliant with the medicaiton regimen    Other Core Components/Risk Factors Plan:   Goals:  Increase knowledge of CAD: In Progress  Weight loss: In Progress  Control diabetes by adjusting diet and exercise: In Progress  Learn more about healthy eating: In Progress    Comments on Goal Progression:  Pt continues to work on controlling risk factors.    Interventions:  Individual Education/ Counseling: Yes  Physician Referral: No    Education:    cardiac interventions, verbalizes  understanding; Date: 7/15/2022  fluid overload/CHF, verbalizes understanding; Date: 7/29/2022  warning signs of MI, verbalizes understanding; Date: 7/15/2022        Education method adapted to patients education level and preferred method of learning.  Method: explanation  demonstration  handouts    Comments:  Pt encouraged to continue following the mediterranean diet and limiting her salt intake. Pt also encouraged to be more active at home on the days she is not in cardiac rehab.    Other Core Components/Hypertension Assessment:   BP Range: 112-158 systolic; 58-86 diastolic  BP at Goal: No  Patient reported symptoms: none    Other Core Components/Hypertension Plan:   Goals:  Blood Pressure <130/80    Comments on Goal Progression:  Pt encouraged to take her blood pressure daily at home and keep a log.    Interventions:  Med Card Reconciled: Yes  Encourage medication compliance  Encourage sodium reduction  Encourage weight loss  Recommend physical activity  Educate on contributory factors  Encourage home blood pressure monitoring  Recommend daily weights    Education:    Congestive Heart Failure; verbalizes understanding; Date: 7/15/2022        Comments:  Pt participates in the [facility] digital hypertensive program but has not been compliant in recording her blood pressure daily but states she does take it often at home and it is low.    Does the patient have Heart Failure? No      Other Core Components/Tobacco Cessation Assessment:   Smoking Status: lifetime non-smoker  Smoking Cessation Barriers: none  Stage of Readiness to Change: Maintenance    Other Core Components/Tobacco Cessation Plan:   Goals:  Maintain non-smoking status    Comments on Goal Progression:  Pt does not smoke    Interventions:  Maintains non-smoking status    Education:    Heart and Lung Anatomy; verbalizes understanding; Date: 7/15/2022  Benefits of Cardiac Rehab; verbalizes understanding; Date: 8/01/2022        Comments:  Pt has no desire to  use tobacco products.

## 2022-08-08 ENCOUNTER — CLINICAL SUPPORT (OUTPATIENT)
Dept: CARDIAC REHAB | Facility: CLINIC | Age: 77
End: 2022-08-08
Payer: MEDICARE

## 2022-08-08 DIAGNOSIS — I25.10 CORONARY ARTERY DISEASE INVOLVING NATIVE CORONARY ARTERY OF NATIVE HEART WITHOUT ANGINA PECTORIS: ICD-10-CM

## 2022-08-08 DIAGNOSIS — Z95.1 POSTSURGICAL AORTOCORONARY BYPASS STATUS: ICD-10-CM

## 2022-08-08 PROCEDURE — 93798 PR CARDIAC REHAB/MONITOR: ICD-10-PCS | Mod: S$GLB,,, | Performed by: INTERNAL MEDICINE

## 2022-08-08 PROCEDURE — 93798 PHYS/QHP OP CAR RHAB W/ECG: CPT | Mod: S$GLB,,, | Performed by: INTERNAL MEDICINE

## 2022-08-10 ENCOUNTER — CLINICAL SUPPORT (OUTPATIENT)
Dept: CARDIAC REHAB | Facility: CLINIC | Age: 77
End: 2022-08-10
Payer: MEDICARE

## 2022-08-10 DIAGNOSIS — Z95.1 POSTSURGICAL AORTOCORONARY BYPASS STATUS: ICD-10-CM

## 2022-08-10 DIAGNOSIS — I25.10 CORONARY ATHEROSCLEROSIS OF NATIVE CORONARY ARTERY: ICD-10-CM

## 2022-08-10 PROCEDURE — 93798 PR CARDIAC REHAB/MONITOR: ICD-10-PCS | Mod: S$GLB,,, | Performed by: INTERNAL MEDICINE

## 2022-08-10 PROCEDURE — 93798 PHYS/QHP OP CAR RHAB W/ECG: CPT | Mod: S$GLB,,, | Performed by: INTERNAL MEDICINE

## 2022-08-11 ENCOUNTER — DOCUMENTATION ONLY (OUTPATIENT)
Dept: CARDIAC REHAB | Facility: CLINIC | Age: 77
End: 2022-08-11
Payer: MEDICARE

## 2022-08-11 NOTE — PROGRESS NOTES
Miss Page has completed 12 out of 36 exercise session of Phase II cardiac rehab.  A follow up reassessment will be completed at 24 sessions.    12 Session Follow Up   Cardiac Rehab Individual Treatment Plan - Reassessment      Patient Name: Kaylee Romeor MRN: [id number]   : 1945   Age: 77 y.o.   Primary Diagnosis: CABG Date of Event: 19   EF: 60%  Risk Stratification: high  Referring Physician: MARGRET   Exercise Assessment:     Angina with exercise?: No   ST Depression with Exercise?: No  Fall Risk: Yes   Assistive Devices:  cane  There were some limitations noted by the patient due to chronic knee pain.  Comments on Progression: Miss Page is progressing well and her exercise workloads will continue to be increased as she tolerates exercise intensity.    Exercise Plan:   Goals:  CR Exercise Goals: Attend Cardiac Rehab 3 times/week: In Progress  Home Aerobic Exercise: 2 additional days/week for 30-60 minutes: In Progress  Intensity of 12-15 on the Rate of Perceived Exertion (RPE) scale: In Progress  30% increase in entry estimated METS: 3.9 : In Progress  5 days/week for 30-60 minutes: In Progress    Comments on Goal Progression:  Patient Consistency: consistent with attendance  Home exercise? Yes: floor pedal cycler; Frequency: 3 non-rehab days per week; Duration 50 minutes  Patient reports intensity rate 11-15 on RPE scale    Intervention/Recommendations:   Discussed importance of regular attendance to cardiac rehab class    Exercise Prescription:  THR Range    Mode: Nustep   Frequency:  3 days/week   Duration:  30-60 minutes   Intensity:  12-15 RPE   Resistance Training:  No: 0 lb weights with 10-15 reps based on strength and range of motion and adjusted accordingly     Home Prescription:  Mode Aerobic exercise   Frequency: 2- 3 days/week   Duration: 30-60 minutes   Resistance Training: None     Education:  Flexibility/Stretching; verbalizes understanding; Date: 22  Resistance Training;  "verbalizes understanding; Date: 22  What Comes After Phase II?; verbalizes understanding; Date: 22    Comments:  I reviewed exercise recommendations with Miss Page.  I encouraged her to continue exercising but to try walking more.  She stated understanding.     The exercise prescription will continue to be adjusted based on tolerance of exercise intensity by patient.    Shan Junior, CEP    12 Session Follow Up   Cardiac Rehab Individual Treatment Plan - Reassessment    Patient Name: Kaylee Romero MRN: [id number]   : 1945   Age: 77 y.o.   Primary Diagnosis: s/p CABG, CAD, HTN, h/o MI, h/o TIA, DM    Nutrition Assessment:     Anthropometrics    Height 62 inches   Weight 235 lbs   BMI 43     Patient confirms she is taking lipitor 80mg for cholesterol control.  Difficulty Chewing or Swallowing: no  Current Exercise: See Exercise Physiologist Note  Food Safety/Food Preparation: spouse  Living Arrangements/Family Support: Lives with spouse  Cultural/Spiritual/Personal Preferences: not applicable   Barriers to Education: personal beliefs  Stage of Change Related to Diet Habits: Preparation  Recent Changes to Diet: No  Food Diary: Given      Nutrition Plan:   Goals:  LDL-C < 70 (for high risk patients)  Hgb A1c < 7%  BMI < 25 and abdominal girth < 40M/<35 F  2 gram sodium, Mediterranean diet: In Progress  Rehab weight loss goal of 10 lbs, 1 lb per week: In Progress  Fish intake (non-fried varieties) to a goal of 2-3 servings per week: In Progress  Increase fruit and vegetable intake: In Progress    Comments on Goal Progression:  Pt believes she has no issues with diet choices and portions despite no change in weight since starting program- she believes this is r/t "eating regular meals r/t increased appetite r/t exercise". Attempted to discuss food choices/portions and pt abruptly stopped RD stating "those things have never been a problem for me"    Interventions:  Dietitian Consult: No  Patient " to participate in Cardiac Rehab sessions three times a week  Weekly Dietitian Weight Check  Nutrition Recommendations Provided: Verbal, Reviewed  Follow Up Plan for Ongoing Self-Management Support    Education:  Protein; verbalizes understanding; Date: 7/27/22  Seafood; verbalizes understanding; Date: 7/20/22  Sodium; verbalizes understanding; Date: 7/13/22  Pre/Probiotics; verbalizes understanding; Date: 7/6/22    Comments:   Discussed ways to incorporate healthy snacks, eating on a schedule, and monitoring sodium intake for heart health.    Diabetes  Is the patient diabetic?   Yes   Other Core Components/Diabetes Assessment:   Labs:  Lab Results   Component Value Date    GLUF 189 (H) 06/23/2022    GLUF 121 (H) 12/11/2019    GLUF 106 04/07/2011     Lab Results   Component Value Date    HGBA1C 6.4 (H) 06/23/2022    HGBA1C 6.0 (H) 05/04/2022    HGBA1C 6.3 (H) 01/06/2022      Lab Results   Component Value Date    ESTIMATEDAVG 137 (H) 06/23/2022    ESTIMATEDAVG 126 05/04/2022    ESTIMATEDAVG 134 (H) 01/06/2022       History of diabetes since 2013  Diabetes medications: Basaglar 22u @[facility], Trulicity injection 4.5mg weekly, Glipizide 10mg BID  Blood Glucose Checks at Home: Yes: Other: daily  Typical morning range: 110-130 mg/dl  Endocrinologist or PCP following DM: Dr. Fox endocrinology    Other Core Components/Diabetes Plan:   Goals:  Hgb A1c < 7%  Exercise Blood Glucose: 100-300 mg/dl    Interventions:  Medication Compliance  Med Card Reconciled  Low Sodium, Mediterranean, ADA Diet  Weight Management  Physical Activity  Increase Knowledge of Contributory Factors  Home Monitoring      Comments:   Patient verbalizes understanding to bring home glucometer and check glucose pre and post each exercise session.  Per cardiac rehab protocols, patient's glucose must be between 90 and 270 mg/dL to exercise.  Patient denies any recent glucose levels less than 60 mg/dL or greater than 300 mg/dL. Patient verbalizes importance of  notifying rehab staff if symptoms of hypoglycemia occur while at cardiac rehab.  Abnormal labs will be reported to patient's PCP/Endocrinologist by rehab staff.    Noted updated glucose parameters of 100-300    Emphasized importance of pt checking glucose more regularly, especially prior to taking HS insulin    Keila Mccarty MS, RDN/LDN    Session: 12 Session Follow Up Cardiac Rehab Individual Treatment Plan - Reassessment      Patient Name: Kaylee Romero MRN: [id number]   : 1945   Age: 77 y.o.   Primary Diagnosis: z95.1    Psychosocial Assessment:   Living Arrangements: Lives with spouse, son  Family Support: spouse  Self Reported: no change Effective Coping Skills  Displays: calmness  Medication: not applicable    Psychosocial Plan:   Goals:  Verbalizes coping mechanisms: In Progress  Maintain positive support system: Met  Maintain positive outlook: Met  Improve overall quality of life: In Progress    Comments on Goal Progression:  Pt is attending regularly and maintains positive outlook.    Interventions:  Patient to Self Report Emotional Changes at Session Check In  Recommend Physical Activity  Recommend Attending Education Lectures  Notify MD: No  Program Referral: No  Pharmaceutical Intervention/Therapy: No  Other Needs: not applicable  Stage of Readiness to Change: Action    Education:  Coping techniques, verbalizes understanding; Date:7/15/2022    Comments:  Pt denies any overwhelming stress or anxiety.  Patient has been instructed to notify staff in the event that circumstances worsen.  Patient verbalizes understanding.    Other Core Components/Risk Factors Assessment:   RISK FACTORS:  diabetes, hyperlipidemia, hypertension, obesity, positive family history, sedentary lifestyle    Learning Barriers: Physical Condition    Medication Compliance: has been compliant with the medicaiton regimen    Other Core Components/Risk Factors Plan:   Goals:  Increase knowledge of CAD: In Progress  Weight loss:  In Progress  Control diabetes by adjusting diet and exercise: In Progress  Learn more about healthy eating: In Progress    Comments on Goal Progression:  Pt continues to work on controlling risk factors.    Interventions:  Individual Education/ Counseling: Yes  Physician Referral: No    Education:    cardiac interventions, verbalizes understanding; Date: 7/15/2022  fluid overload/CHF, verbalizes understanding; Date: 7/29/2022  warning signs of MI, verbalizes understanding; Date: 7/15/2022        Education method adapted to patients education level and preferred method of learning.  Method: explanation  demonstration  handouts    Comments:  Pt encouraged to continue following the mediterranean diet and limiting her salt intake. Pt also encouraged to be more active at home on the days she is not in cardiac rehab.    Other Core Components/Hypertension Assessment:   BP Range: 112-158 systolic; 58-86 diastolic  BP at Goal: No  Patient reported symptoms: none    Other Core Components/Hypertension Plan:   Goals:  Blood Pressure <130/80    Comments on Goal Progression:  Pt encouraged to take her blood pressure daily at home and keep a log.    Interventions:  Med Card Reconciled: Yes  Encourage medication compliance  Encourage sodium reduction  Encourage weight loss  Recommend physical activity  Educate on contributory factors  Encourage home blood pressure monitoring  Recommend daily weights    Education:    Congestive Heart Failure; verbalizes understanding; Date: 7/15/2022        Comments:  Pt participates in the [facility] digital hypertensive program but has not been compliant in recording her blood pressure daily but states she does take it often at home and it is low.    Does the patient have Heart Failure? No      Other Core Components/Tobacco Cessation Assessment:   Smoking Status: lifetime non-smoker  Smoking Cessation Barriers: none  Stage of Readiness to Change: Maintenance    Other Core Components/Tobacco  Cessation Plan:   Goals:  Maintain non-smoking status    Comments on Goal Progression:  Pt does not smoke    Interventions:  Maintains non-smoking status    Education:    Heart and Lung Anatomy; verbalizes understanding; Date: 7/15/2022  Benefits of Cardiac Rehab; verbalizes understanding; Date: 8/01/2022        Comments:  Pt has no desire to use tobacco products.    Francisco Sanches RN

## 2022-08-12 ENCOUNTER — CLINICAL SUPPORT (OUTPATIENT)
Dept: CARDIAC REHAB | Facility: CLINIC | Age: 77
End: 2022-08-12
Payer: MEDICARE

## 2022-08-12 NOTE — PROGRESS NOTES
Patient here for Phase II cardiac rehab session.  While patient was exercising on nu-step, she began experiencing 5/10 chest discomfort.  Exercise stopped.  Pain to subside minimally after resting.  Patient took 1 NTG sublingual & discomfort subsided after approximately 3 minutes.  No EKG changes noted on monitor.  Patient has been instructed to seek attention via ER/911 with any further problems.  Discussed proper way to administer NTG, along with encouraging to call 911 if taking 3rd NTG.  She reports that in the past, she would experience chest discomfort & take 1 NTG every half hour without limiting to 3 tablets.  Strongly encouraged for her to administer the way that I discussed with her.  She verbalizes understanding.  Will continue to monitor patient.  She was discharge free of chest discomfort.

## 2022-08-15 ENCOUNTER — CLINICAL SUPPORT (OUTPATIENT)
Dept: CARDIAC REHAB | Facility: CLINIC | Age: 77
End: 2022-08-15
Payer: MEDICARE

## 2022-08-15 DIAGNOSIS — Z95.1 POSTSURGICAL AORTOCORONARY BYPASS STATUS: ICD-10-CM

## 2022-08-15 DIAGNOSIS — I25.10 ATHEROSCLEROSIS OF NATIVE CORONARY ARTERY OF NATIVE HEART, UNSPECIFIED WHETHER ANGINA PRESENT: ICD-10-CM

## 2022-08-15 PROCEDURE — 93798 PR CARDIAC REHAB/MONITOR: ICD-10-PCS | Mod: S$GLB,,,

## 2022-08-15 PROCEDURE — 93798 PHYS/QHP OP CAR RHAB W/ECG: CPT | Mod: S$GLB,,,

## 2022-08-17 ENCOUNTER — CLINICAL SUPPORT (OUTPATIENT)
Dept: CARDIAC REHAB | Facility: CLINIC | Age: 77
End: 2022-08-17
Payer: MEDICARE

## 2022-08-17 DIAGNOSIS — I25.10 CORONARY ARTERY DISEASE, UNSPECIFIED VESSEL OR LESION TYPE, UNSPECIFIED WHETHER ANGINA PRESENT, UNSPECIFIED WHETHER NATIVE OR TRANSPLANTED HEART: ICD-10-CM

## 2022-08-17 DIAGNOSIS — Z95.1 POSTSURGICAL AORTOCORONARY BYPASS STATUS: ICD-10-CM

## 2022-08-17 PROCEDURE — 93798 PHYS/QHP OP CAR RHAB W/ECG: CPT | Mod: S$GLB,,, | Performed by: INTERNAL MEDICINE

## 2022-08-17 PROCEDURE — 93798 PR CARDIAC REHAB/MONITOR: ICD-10-PCS | Mod: S$GLB,,, | Performed by: INTERNAL MEDICINE

## 2022-08-19 ENCOUNTER — CLINICAL SUPPORT (OUTPATIENT)
Dept: CARDIAC REHAB | Facility: CLINIC | Age: 77
End: 2022-08-19
Payer: MEDICARE

## 2022-08-19 DIAGNOSIS — Z95.1 POSTSURGICAL AORTOCORONARY BYPASS STATUS: ICD-10-CM

## 2022-08-19 DIAGNOSIS — I25.10 ATHEROSCLEROSIS OF NATIVE CORONARY ARTERY OF NATIVE HEART, UNSPECIFIED WHETHER ANGINA PRESENT: ICD-10-CM

## 2022-08-19 PROCEDURE — 93798 PR CARDIAC REHAB/MONITOR: ICD-10-PCS | Mod: S$GLB,,,

## 2022-08-19 PROCEDURE — 93798 PHYS/QHP OP CAR RHAB W/ECG: CPT | Mod: S$GLB,,,

## 2022-08-22 ENCOUNTER — CLINICAL SUPPORT (OUTPATIENT)
Dept: CARDIAC REHAB | Facility: CLINIC | Age: 77
End: 2022-08-22
Payer: MEDICARE

## 2022-08-22 ENCOUNTER — OFFICE VISIT (OUTPATIENT)
Dept: PODIATRY | Facility: CLINIC | Age: 77
End: 2022-08-22
Payer: MEDICARE

## 2022-08-22 VITALS
WEIGHT: 235 LBS | HEIGHT: 62 IN | DIASTOLIC BLOOD PRESSURE: 69 MMHG | SYSTOLIC BLOOD PRESSURE: 119 MMHG | BODY MASS INDEX: 43.24 KG/M2 | HEART RATE: 85 BPM

## 2022-08-22 DIAGNOSIS — E11.9 ENCOUNTER FOR DIABETIC FOOT EXAM: ICD-10-CM

## 2022-08-22 DIAGNOSIS — E11.49 TYPE II DIABETES MELLITUS WITH NEUROLOGICAL MANIFESTATIONS: Primary | ICD-10-CM

## 2022-08-22 DIAGNOSIS — I25.10 CORONARY ATHEROSCLEROSIS OF NATIVE CORONARY ARTERY: ICD-10-CM

## 2022-08-22 DIAGNOSIS — L85.3 DRY SKIN: ICD-10-CM

## 2022-08-22 DIAGNOSIS — B35.1 DERMATOPHYTOSIS OF NAIL: ICD-10-CM

## 2022-08-22 DIAGNOSIS — Z95.1 POSTSURGICAL AORTOCORONARY BYPASS STATUS: ICD-10-CM

## 2022-08-22 DIAGNOSIS — L84 CORN OR CALLUS: ICD-10-CM

## 2022-08-22 PROCEDURE — 3061F PR NEG MICROALBUMINURIA RESULT DOCUMENTED/REVIEW: ICD-10-PCS | Mod: CPTII,S$GLB,, | Performed by: PODIATRIST

## 2022-08-22 PROCEDURE — 3078F DIAST BP <80 MM HG: CPT | Mod: CPTII,S$GLB,, | Performed by: PODIATRIST

## 2022-08-22 PROCEDURE — 3044F PR MOST RECENT HEMOGLOBIN A1C LEVEL <7.0%: ICD-10-PCS | Mod: CPTII,S$GLB,, | Performed by: PODIATRIST

## 2022-08-22 PROCEDURE — 93798 PR CARDIAC REHAB/MONITOR: ICD-10-PCS | Mod: S$GLB,,, | Performed by: INTERNAL MEDICINE

## 2022-08-22 PROCEDURE — 1101F PT FALLS ASSESS-DOCD LE1/YR: CPT | Mod: CPTII,S$GLB,, | Performed by: PODIATRIST

## 2022-08-22 PROCEDURE — 1126F AMNT PAIN NOTED NONE PRSNT: CPT | Mod: CPTII,S$GLB,, | Performed by: PODIATRIST

## 2022-08-22 PROCEDURE — 1160F PR REVIEW ALL MEDS BY PRESCRIBER/CLIN PHARMACIST DOCUMENTED: ICD-10-PCS | Mod: CPTII,S$GLB,, | Performed by: PODIATRIST

## 2022-08-22 PROCEDURE — 99999 PR PBB SHADOW E&M-EST. PATIENT-LVL IV: ICD-10-PCS | Mod: PBBFAC,,, | Performed by: PODIATRIST

## 2022-08-22 PROCEDURE — 1160F RVW MEDS BY RX/DR IN RCRD: CPT | Mod: CPTII,S$GLB,, | Performed by: PODIATRIST

## 2022-08-22 PROCEDURE — 3066F PR DOCUMENTATION OF TREATMENT FOR NEPHROPATHY: ICD-10-PCS | Mod: CPTII,S$GLB,, | Performed by: PODIATRIST

## 2022-08-22 PROCEDURE — 4010F PR ACE/ARB THEARPY RXD/TAKEN: ICD-10-PCS | Mod: CPTII,S$GLB,, | Performed by: PODIATRIST

## 2022-08-22 PROCEDURE — 11721 DEBRIDE NAIL 6 OR MORE: CPT | Mod: Q9,59,S$GLB, | Performed by: PODIATRIST

## 2022-08-22 PROCEDURE — 1101F PR PT FALLS ASSESS DOC 0-1 FALLS W/OUT INJ PAST YR: ICD-10-PCS | Mod: CPTII,S$GLB,, | Performed by: PODIATRIST

## 2022-08-22 PROCEDURE — 99499 NO LOS: ICD-10-PCS | Mod: S$GLB,,, | Performed by: PODIATRIST

## 2022-08-22 PROCEDURE — 11056 PARNG/CUTG B9 HYPRKR LES 2-4: CPT | Mod: Q9,S$GLB,, | Performed by: PODIATRIST

## 2022-08-22 PROCEDURE — 3288F FALL RISK ASSESSMENT DOCD: CPT | Mod: CPTII,S$GLB,, | Performed by: PODIATRIST

## 2022-08-22 PROCEDURE — 99499 UNLISTED E&M SERVICE: CPT | Mod: S$GLB,,, | Performed by: PODIATRIST

## 2022-08-22 PROCEDURE — 3074F SYST BP LT 130 MM HG: CPT | Mod: CPTII,S$GLB,, | Performed by: PODIATRIST

## 2022-08-22 PROCEDURE — 3288F PR FALLS RISK ASSESSMENT DOCUMENTED: ICD-10-PCS | Mod: CPTII,S$GLB,, | Performed by: PODIATRIST

## 2022-08-22 PROCEDURE — 1159F PR MEDICATION LIST DOCUMENTED IN MEDICAL RECORD: ICD-10-PCS | Mod: CPTII,S$GLB,, | Performed by: PODIATRIST

## 2022-08-22 PROCEDURE — 1159F MED LIST DOCD IN RCRD: CPT | Mod: CPTII,S$GLB,, | Performed by: PODIATRIST

## 2022-08-22 PROCEDURE — 4010F ACE/ARB THERAPY RXD/TAKEN: CPT | Mod: CPTII,S$GLB,, | Performed by: PODIATRIST

## 2022-08-22 PROCEDURE — 3066F NEPHROPATHY DOC TX: CPT | Mod: CPTII,S$GLB,, | Performed by: PODIATRIST

## 2022-08-22 PROCEDURE — 93798 PHYS/QHP OP CAR RHAB W/ECG: CPT | Mod: S$GLB,,, | Performed by: INTERNAL MEDICINE

## 2022-08-22 PROCEDURE — 11721 ROUTINE FOOT CARE: ICD-10-PCS | Mod: Q9,59,S$GLB, | Performed by: PODIATRIST

## 2022-08-22 PROCEDURE — 99999 PR PBB SHADOW E&M-EST. PATIENT-LVL IV: CPT | Mod: PBBFAC,,, | Performed by: PODIATRIST

## 2022-08-22 PROCEDURE — 3078F PR MOST RECENT DIASTOLIC BLOOD PRESSURE < 80 MM HG: ICD-10-PCS | Mod: CPTII,S$GLB,, | Performed by: PODIATRIST

## 2022-08-22 PROCEDURE — 1126F PR PAIN SEVERITY QUANTIFIED, NO PAIN PRESENT: ICD-10-PCS | Mod: CPTII,S$GLB,, | Performed by: PODIATRIST

## 2022-08-22 PROCEDURE — 3044F HG A1C LEVEL LT 7.0%: CPT | Mod: CPTII,S$GLB,, | Performed by: PODIATRIST

## 2022-08-22 PROCEDURE — 3074F PR MOST RECENT SYSTOLIC BLOOD PRESSURE < 130 MM HG: ICD-10-PCS | Mod: CPTII,S$GLB,, | Performed by: PODIATRIST

## 2022-08-22 PROCEDURE — 11056 ROUTINE FOOT CARE: ICD-10-PCS | Mod: Q9,S$GLB,, | Performed by: PODIATRIST

## 2022-08-22 PROCEDURE — 3061F NEG MICROALBUMINURIA REV: CPT | Mod: CPTII,S$GLB,, | Performed by: PODIATRIST

## 2022-08-22 RX ORDER — AMMONIUM LACTATE 12 G/100G
LOTION TOPICAL
Qty: 225 G | Refills: 6 | Status: SHIPPED | OUTPATIENT
Start: 2022-08-22

## 2022-08-22 NOTE — PROGRESS NOTES
"  Chief Concern: Diabetic Foot Exam (Foot Exam/PCP Liz Roberts MD  06/27/22)      HPI:  Kaylee Romero is a 77 y.o. female presents for foot exam and care.       PCP:  Liz Roberts MD, her last visit with her PCP was on 6/27/2022          Patient Active Problem List   Diagnosis    Osteoarthritis of knee    Essential hypertension    Dyslipidemia, goal LDL below 70    Morbid obesity with body mass index (BMI) of 40.0 or higher    KAMRAN on CPAP    Physical deconditioning    Osteoporosis without current pathological fracture    History of TIA (transient ischemic attack)    Age-related osteoporosis without current pathological fracture    Vitamin D deficiency, unspecified    Type 2 diabetes mellitus with stage 3 chronic kidney disease, without long-term current use of insulin    History of MI (myocardial infarction)    Coronary 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 pectoris    S/P CABG (coronary artery bypass graft)    Shoulder pain    Chest pain    Physical debility    GRANADO (dyspnea on exertion)    S/P drug eluting coronary stent placement    Decreased range of motion of left knee    Chronic pain of right knee    Antalgic gait    Abnormal stress test    Aortic atherosclerosis    Chronic heart failure with preserved ejection fraction                 Current Outpatient Medications on File Prior to Visit   Medication Sig Dispense Refill    acetaminophen (TYLENOL) 500 MG tablet Take 500 mg by mouth 2 (two) times daily as needed for Pain.      alendronate (FOSAMAX) 70 MG tablet TAKE 1 TABLET(70 MG) BY MOUTH EVERY 7 DAYS (Patient taking differently: Take 70 mg by mouth every Sunday.) 12 tablet 0    amLODIPine (NORVASC) 10 MG tablet TAKE 1 TABLET(10 MG) BY MOUTH EVERY DAY 90 tablet 2    atorvastatin (LIPITOR) 80 MG tablet TAKE 1 TABLET(80 MG) BY MOUTH EVERY DAY 90 tablet 3    BD BOO 2ND GEN PEN NEEDLE 32 gauge x 5/32" Ndle USE EVERY  each 8 "    blood sugar diagnostic Strp 1 strip by Misc.(Non-Drug; Combo Route) route once daily. 100 strip 3    carvediloL (COREG) 25 MG tablet TAKE 1 TABLET(25 MG) BY MOUTH TWICE DAILY WITH MEALS 180 tablet 1    clopidogreL (PLAVIX) 75 mg tablet TAKE 1 TABLET(75 MG) BY MOUTH EVERY DAY 90 tablet 3    COD LIVER OIL ORAL TAKE 2 CAPSULES BY MOUTH EVERY MORNING AND 1 CAPSULE EVERY EVENING      diclofenac sodium (VOLTAREN) 1 % Gel Apply 2 g topically 4 (four) times daily as needed. To painful area on the feet. (Patient taking differently: Apply 2 g topically 4 (four) times daily as needed. To painful area on the feet and knee) 500 g 5    diphenoxylate-atropine 2.5-0.025 mg (LOMOTIL) 2.5-0.025 mg per tablet TAKE 1 TABLET BY MOUTH FOUR TIMES DAILY AS NEEDED FOR DIARRHEA 30 tablet 1    donepeziL (ARICEPT) 5 MG tablet TAKE 1 TABLET(5 MG) BY MOUTH EVERY EVENING FOR MEMORY 90 tablet 2    dulaglutide (TRULICITY) 4.5 mg/0.5 mL pen injector Inject 4.5 mg into the skin every 7 days. (Patient taking differently: Inject 4.5 mg into the skin every Sunday.) 2 mL 3    FEROSUL 325 mg (65 mg iron) Tab tablet TAKE 1 TABLET BY MOUTH DAILY 90 tablet 1    furosemide (LASIX) 20 MG tablet TAKE 1 TABLET(20 MG) BY MOUTH EVERY DAY (Patient taking differently: Take 20 mg by mouth every Sat, Sun, Mon, Wed, & Fri) 30 tablet 3    glucagon (GVOKE HYPOPEN 2-PACK) 1 mg/0.2 mL AtIn Inject 1 Package into the skin as needed. 2 Syringe 0    insulin glargine,hum.rec.anlog (BASAGLAR KWIKPEN U-100 INSULIN SUBQ) Inject 22 Units into the skin every evening.      irbesartan (AVAPRO) 300 MG tablet Take 1 tablet (300 mg total) by mouth every evening. 90 tablet 2    isosorbide mononitrate (IMDUR) 30 MG 24 hr tablet Take 2 tablets (60 mg total) by mouth once daily. 180 tablet 3    lancets Misc 1 lancet by Misc.(Non-Drug; Combo Route) route once daily. 100 each 3    multivitamin (THERAGRAN) per tablet Take 1 tablet by mouth once daily.      nitroGLYCERIN  "(NITROSTAT) 0.4 MG SL tablet Place 1 tablet (0.4 mg total) under the tongue every 5 (five) minutes as needed for Chest pain. 25 tablet 6    vitamin D (VITAMIN D3) 1000 units Tab Take 1,000 Units by mouth twice a week. Monday AND THURSDAYS ONLY      aspirin 81 MG Chew Take 1 tablet (81 mg total) by mouth once daily.  0    glipiZIDE (GLUCOTROL) 5 MG tablet Take 2 tablets (10 mg total) by mouth 2 (two) times daily with meals. TAKE 2 TABLETS BY MOUTH DAILY WITH DINNER OR EVENING MEAL (Patient taking differently: Take 10 mg by mouth 2 (two) times daily with meals.) 120 tablet 8     Current Facility-Administered Medications on File Prior to Visit   Medication Dose Route Frequency Provider Last Rate Last Admin    hyaluronate sodium, stabilized (MONOVISC) Syrg   Intra-articular 1 time in Clinic/HOD Peterson Sharma MD             Review of patient's allergies indicates:   Allergen Reactions    Keflex [cephalexin] Other (See Comments)     Turns orange    Bactrim [sulfamethoxazole-trimethoprim]      Generic version  Sulfa makes her sick               Objective:        Vitals:    08/22/22 1137   BP: 119/69   Pulse: 85   Weight: 106.6 kg (235 lb)   Height: 5' 2" (1.575 m)         Physical Exam  Constitutional:       Appearance: She is well-developed.   Cardiovascular:      Comments: DP and PT pulses 2/4 loreta.   Edema noted loreta.   Capillary refill time < 3 seconds to digits 1-5, loreta.   Absent hair growth      Musculoskeletal:         General: Deformity present. No tenderness. Normal range of motion.      Comments: HAV noted to loreta 1st MPJ. 5/5 strength to all LE muscle groups. No POP noted     Skin:     Capillary Refill: Capillary refill takes 2 to 3 seconds.      Findings: No erythema.      Comments: Toenails x 10 are elongated by 1-3mm, thickened by 1-3mm and mycotic with subungual debris.  Flaky skin on the soles.  No vesicles or redness.      Neurological:      Mental Status: She is alert and oriented to person, place, " and time.      Comments: +paresthesias (Abnormal spontaneous sensations in feet)                   Assessment:       Problem List Items Addressed This Visit    None     Visit Diagnoses     Type II diabetes mellitus with neurological manifestations    -  Primary    Dry skin        Relevant Medications    ammonium lactate (LAC-HYDRIN) 12 % lotion    Corn or callus        Relevant Medications    ammonium lactate (LAC-HYDRIN) 12 % lotion    Dermatophytosis of nail        Encounter for diabetic foot exam                Plan:         · I counseled the patient on the patient's conditions, their implications and medical management.   Shoe inspection.      Maintain proper foot hygiene.    Continue wearing proper shoe gear, daily foot inspections, never walking without protective shoe gear, never putting sharp instruments to feet.    Routine Foot Care    Date/Time: 8/22/2022 11:45 AM  Performed by: Donna Gillis DPM  Authorized by: Donna Gillis DPM     Consent Done?:  Yes (Verbal)  Hyperkeratotic Skin Lesions?: Yes    Number of trimmed lesions:  2  Location(s):  Left 1st Toe and Right 1st Toe    Nail Care Type:  Debride  Location(s): All  (Left 1st Toe, Left 3rd Toe, Left 2nd Toe, Left 4th Toe, Left 5th Toe, Right 1st Toe, Right 2nd Toe, Right 3rd Toe, Right 4th Toe and Right 5th Toe)  Patient tolerance:  Patient tolerated the procedure well with no immediate complications     With patient's permission, the toenails mentioned above were reduced and debrided using a nail nipper, removing offending nail and debris.   Utilizing a #15 scalpel, I trimmed the corns and calluses at the above mentioned location.      The patient will continue to monitor the areas daily, inspect the feet, wear protective shoe gear when ambulatory, and moisturizer to maintain skin integrity.                       Donna Gillis DPM

## 2022-08-23 ENCOUNTER — PATIENT MESSAGE (OUTPATIENT)
Dept: CARDIOLOGY | Facility: CLINIC | Age: 77
End: 2022-08-23
Payer: MEDICARE

## 2022-08-24 ENCOUNTER — CLINICAL SUPPORT (OUTPATIENT)
Dept: CARDIAC REHAB | Facility: CLINIC | Age: 77
End: 2022-08-24
Payer: MEDICARE

## 2022-08-24 DIAGNOSIS — Z95.1 POSTSURGICAL AORTOCORONARY BYPASS STATUS: ICD-10-CM

## 2022-08-24 DIAGNOSIS — I25.10 CORONARY ATHEROSCLEROSIS OF NATIVE CORONARY ARTERY: ICD-10-CM

## 2022-08-24 PROCEDURE — 93798 PHYS/QHP OP CAR RHAB W/ECG: CPT | Mod: S$GLB,,, | Performed by: INTERNAL MEDICINE

## 2022-08-24 PROCEDURE — 93798 PR CARDIAC REHAB/MONITOR: ICD-10-PCS | Mod: S$GLB,,, | Performed by: INTERNAL MEDICINE

## 2022-08-29 ENCOUNTER — CLINICAL SUPPORT (OUTPATIENT)
Dept: CARDIAC REHAB | Facility: CLINIC | Age: 77
End: 2022-08-29
Payer: MEDICARE

## 2022-08-29 DIAGNOSIS — Z95.1 POSTSURGICAL AORTOCORONARY BYPASS STATUS: Primary | ICD-10-CM

## 2022-08-29 DIAGNOSIS — I25.10 ATHEROSCLEROSIS OF NATIVE CORONARY ARTERY OF NATIVE HEART, UNSPECIFIED WHETHER ANGINA PRESENT: ICD-10-CM

## 2022-08-29 DIAGNOSIS — I25.10 ATHEROSCLEROSIS OF NATIVE CORONARY ARTERY OF NATIVE HEART WITHOUT ANGINA PECTORIS: ICD-10-CM

## 2022-08-29 DIAGNOSIS — R73.09 IMPAIRED GLUCOSE TOLERANCE TEST: ICD-10-CM

## 2022-08-29 PROCEDURE — 93798 PR CARDIAC REHAB/MONITOR: ICD-10-PCS | Mod: S$GLB,,, | Performed by: INTERNAL MEDICINE

## 2022-08-29 PROCEDURE — 93798 PHYS/QHP OP CAR RHAB W/ECG: CPT | Mod: S$GLB,,, | Performed by: INTERNAL MEDICINE

## 2022-08-31 ENCOUNTER — DOCUMENTATION ONLY (OUTPATIENT)
Dept: CARDIAC REHAB | Facility: CLINIC | Age: 77
End: 2022-08-31

## 2022-08-31 ENCOUNTER — CLINICAL SUPPORT (OUTPATIENT)
Dept: CARDIAC REHAB | Facility: CLINIC | Age: 77
End: 2022-08-31
Payer: MEDICARE

## 2022-08-31 DIAGNOSIS — I25.10 ATHEROSCLEROSIS OF NATIVE CORONARY ARTERY OF NATIVE HEART WITHOUT ANGINA PECTORIS: ICD-10-CM

## 2022-08-31 DIAGNOSIS — Z95.1 POSTSURGICAL AORTOCORONARY BYPASS STATUS: Primary | ICD-10-CM

## 2022-08-31 PROCEDURE — 93798 PR CARDIAC REHAB/MONITOR: ICD-10-PCS | Mod: S$GLB,,, | Performed by: INTERNAL MEDICINE

## 2022-08-31 PROCEDURE — 93798 PHYS/QHP OP CAR RHAB W/ECG: CPT | Mod: S$GLB,,, | Performed by: INTERNAL MEDICINE

## 2022-08-31 NOTE — PROGRESS NOTES
24 Session Follow Up   Cardiac Rehab Individual Treatment Plan - Reassessment    Patient Name: Kaylee Romero MRN: [id number]   : 1945   Age: 77 y.o.   Primary Diagnosis: s/p CABG, h/o MI, h/o TIA, CAD, HTN    Nutrition Assessment:     Anthropometrics    Height 63 inches   Weight unknown lbs   BMI unknown     Patient confirms she is taking lipitor 80mg for cholesterol control.  Difficulty Chewing or Swallowing: no  Current Exercise: See Exercise Physiologist Note  Food Safety/Food Preparation: spouse  Living Arrangements/Family Support: Lives with spouse  Cultural/Spiritual/Personal Preferences: not applicable   Barriers to Education: none identified  Stage of Change Related to Diet Habits: Action  Recent Changes to Diet: Yes - pt states she is eating more fish and paying attention to having more color variety with meals  Food Diary: Completed      Nutrition Plan:   Goals:  LDL-C < 70 (for high risk patients)  Hgb A1c < 7%  BMI < 25 and abdominal girth < 40M/<35 F  2 gram sodium, Mediterranean diet: In Progress  Rehab weight loss goal of 10 lbs, 1 lb per week: In Progress  Fish intake (non-fried varieties) to a goal of 2-3 servings per week: In Progress  Increase fruit and vegetable intake: In Progress    Comments on Goal Progression:  Pt eating a wader variety of produce    Interventions:  Dietitian Consult: No  Patient to participate in Cardiac Rehab sessions three times a week  Weekly Dietitian Weight Check  Nutrition Recommendations Provided: verbal and written, Reviewed  Follow Up Plan for Ongoing Self-Management Support    Education:  Dining Out; verbalizes understanding; Date: 8/10/22  Mediterranean Diet; verbalizes understanding; Date: 22  Recipe Modication; verbalizes understanding; Date: 22  Vegetarianism; verbalizes understanding; Date: 22  Vitamins; verbalizes understanding; Date: 8/3/22    Comments:   Discussed ways to incorporate healthy snacks, eating on a schedule, and  monitoring sodium intake for heart health.    Diabetes  Is the patient diabetic? Yes   Other Core Components/Diabetes Assessment:   Labs:  Lab Results   Component Value Date    GLUF 189 (H) 06/23/2022    GLUF 121 (H) 12/11/2019    GLUF 106 04/07/2011     Lab Results   Component Value Date    HGBA1C 6.4 (H) 06/23/2022    HGBA1C 6.0 (H) 05/04/2022    HGBA1C 6.3 (H) 01/06/2022      Lab Results   Component Value Date    ESTIMATEDAVG 137 (H) 06/23/2022    ESTIMATEDAVG 126 05/04/2022    ESTIMATEDAVG 134 (H) 01/06/2022       History of diabetes since 2013  Diabetes medications: Glipizide 10mg BID, Basaglar insulin 22u @[facility], Trulicity injection 4.5mg weekly  Blood Glucose Checks at Home: Yes: random blood sugars  Typical morning range: 120-140 mg/dl  Endocrinologist or PCP following DM: Dr. Fox endocrinology    Other Core Components/Diabetes Plan:   Goals:  Hgb A1c < 7%  Exercise Blood Glucose: 100-300 mg/dl    Interventions:  Medication Compliance  Med Card Reconciled  Low Sodium, Mediterranean, ADA Diet  Weight Management  Physical Activity  Increase Knowledge of Contributory Factors  Home Monitoring    Education:  Diabetes; verbalizes understanding; Date: 8/29/22  Risk Factors; verbalizes understanding; Date: 8/5/22  Stress; verbalizes understanding; Date: 8/19/22  Mediterranean Diet; verbalizes understanding; Date: 8/24/22  Dining Out; verbalizes understanding; Date: 8/10/22    Comments:   Patient verbalizes understanding to bring home glucometer and check glucose pre and post each exercise session.  Per cardiac rehab protocols, patient's glucose must be between 90 and 270 mg/dL to exercise.  Patient denies any recent glucose levels less than 60 mg/dL or greater than 300 mg/dL. Patient verbalizes importance of notifying rehab staff if symptoms of hypoglycemia occur while at cardiac rehab.  Abnormal labs will be reported to patient's PCP/Endocrinologist by rehab staff.    Noted updated parameters of  100-300    Encouraged checking glucose BID, once prior to HS and insulin use    Keila Mccarty MS, RDN/LDN

## 2022-08-31 NOTE — PROGRESS NOTES
Miss Page has completed 24 out of 36 exercise session of Phase II cardiac rehab.  A follow up reassessment will be completed at exit interview.     24 Session Follow Up   Cardiac Rehab Individual Treatment Plan - Reassessment      Patient Name: Kaylee Romero MRN: [id number]   : 1945   Age: 77 y.o.   Primary Diagnosis: CABG Date of Event: 19   EF: 60%  Risk Stratification: high  Referring Physician: MARGRET   Exercise Assessment:     Angina with exercise?: No   ST Depression with Exercise?: No  Fall Risk: Yes   Assistive Devices:  independent  There were some limitations noted by the patient due to chronic knee pain and is limited to the Nustep.  Comments on Progression: Miss Page is progressing well and her workloads will continue to be increased as she tolerates exercise intensity.     Exercise Plan:   Goals:  CR Exercise Goals: Attend Cardiac Rehab 3 times/week: In Progress  Home Aerobic Exercise: 2 additional days/week for 30-60 minutes: In Progress  Intensity of 12-15 on the Rate of Perceived Exertion (RPE) scale: In Progress  30% increase in entry estimated METS: 3.9 : In Progress  5 days/week for 30-60 minutes: In Progress    Comments on Goal Progression:  Patient Consistency: consistent with attendance  Home exercise? Yes: floor cycle; Frequency: 3 non-rehab days per week; Duration 50 minutes  Patient reports intensity rate 13-15 on RPE scale    Intervention/Recommendations:   Discussed importance of regular attendance to cardiac rehab class    Exercise Prescription:  THR Range    Mode: Nustep   Frequency:  3 days/week   Duration:  30-60 minutes   Intensity:  12-15 RPE   Resistance Training:  No: 0 lb weights with 10-15 reps based on strength and range of motion and adjusted accordingly     Home Prescription:  Mode Aerobic exercise   Frequency: 2- 3 days/week   Duration: 30-60 minutes   Resistance Training: None     Education:  Body Composition; verbalizes understanding; Date:  8-8-22  Diabetes; verbalizes understanding; Date: 8-29-22  Exercise and Rehydration; verbalizes understanding; Date: 8-15-22  Relaxation Techniques; verbalizes understanding; Date: 8-22-22    Comments:  I reviewed exercise recommendations with Miss Page.  I encouraged her to continue exercising and to try walking around her house.  She stated understanding.      The exercise prescription will continue to be adjusted based on tolerance of exercise intensity by patient.    Taina Junior., CEP

## 2022-09-01 ENCOUNTER — DOCUMENTATION ONLY (OUTPATIENT)
Dept: CARDIAC REHAB | Facility: CLINIC | Age: 77
End: 2022-09-01
Payer: MEDICARE

## 2022-09-01 NOTE — PROGRESS NOTES
Miss Page has completed 24 out of 36 exercise session of Phase II cardiac rehab.  A follow up reassessment will be completed at exit interview.     24 Session Follow Up   Cardiac Rehab Individual Treatment Plan - Reassessment      Patient Name: Kaylee Romero MRN: [id number]   : 1945   Age: 77 y.o.   Primary Diagnosis: CABG Date of Event: 19   EF: 60%  Risk Stratification: high  Referring Physician: MARGRET   Exercise Assessment:     Angina with exercise?: No   ST Depression with Exercise?: No  Fall Risk: Yes   Assistive Devices:  independent  There were some limitations noted by the patient due to chronic knee pain and is limited to the Nustep.  Comments on Progression: Miss Page is progressing well and her workloads will continue to be increased as she tolerates exercise intensity.     Exercise Plan:   Goals:  CR Exercise Goals: Attend Cardiac Rehab 3 times/week: In Progress  Home Aerobic Exercise: 2 additional days/week for 30-60 minutes: In Progress  Intensity of 12-15 on the Rate of Perceived Exertion (RPE) scale: In Progress  30% increase in entry estimated METS: 3.9 : In Progress  5 days/week for 30-60 minutes: In Progress    Comments on Goal Progression:  Patient Consistency: consistent with attendance  Home exercise? Yes: floor cycle; Frequency: 3 non-rehab days per week; Duration 50 minutes  Patient reports intensity rate 13-15 on RPE scale    Intervention/Recommendations:   Discussed importance of regular attendance to cardiac rehab class    Exercise Prescription:  THR Range    Mode: Nustep   Frequency:  3 days/week   Duration:  30-60 minutes   Intensity:  12-15 RPE   Resistance Training:  No: 0 lb weights with 10-15 reps based on strength and range of motion and adjusted accordingly     Home Prescription:  Mode Aerobic exercise   Frequency: 2- 3 days/week   Duration: 30-60 minutes   Resistance Training: None     Education:  Body Composition; verbalizes understanding; Date:  22  Diabetes; verbalizes understanding; Date: 22  Exercise and Rehydration; verbalizes understanding; Date: 8-15-22  Relaxation Techniques; verbalizes understanding; Date: 22    Comments:  I reviewed exercise recommendations with Miss Page.  I encouraged her to continue exercising and to try walking around her house.  She stated understanding.      The exercise prescription will continue to be adjusted based on tolerance of exercise intensity by patient.    Shan Junior, [facility] [place]     [place] Follow Up   Cardiac Rehab Individual Treatment Plan - Reassessment    Patient Name: Kaylee Romero MRN: [id number]   : 1945   Age: 77 y.o.   Primary Diagnosis: s/p CABG, h/o MI, h/o TIA, CAD, HTN    Nutrition Assessment:     Anthropometrics    Height 63 inches   Weight unknown lbs   BMI unknown     Patient confirms she is taking lipitor 80mg for cholesterol control.  Difficulty Chewing or Swallowing: no  Current Exercise: See Exercise Physiologist Note  Food Safety/Food Preparation: spouse  Living Arrangements/Family Support: Lives with spouse  Cultural/Spiritual/Personal Preferences: not applicable   Barriers to Education: none identified  Stage of Change Related to Diet Habits: Action  Recent Changes to Diet: Yes - pt states she is eating more fish and paying attention to having more color variety with meals  Food Diary: Completed      Nutrition Plan:   Goals:  LDL-C < 70 (for high risk patients)  Hgb A1c < 7%  BMI < 25 and abdominal girth < 40M/<35 F  2 gram sodium, Mediterranean diet: In Progress  Rehab weight loss goal of 10 lbs, 1 lb per week: In Progress  Fish intake (non-fried varieties) to a goal of 2-3 servings per week: In Progress  Increase fruit and vegetable intake: In Progress    Comments on Goal Progression:  Pt eating a wader variety of produce    Interventions:  Dietitian Consult: No  Patient to participate in Cardiac Rehab sessions three times a week  Weekly Dietitian Weight  Check  Nutrition Recommendations Provided: verbal and written, Reviewed  Follow Up Plan for Ongoing Self-Management Support    Education:  Dining Out; verbalizes understanding; Date: 8/10/22  Mediterranean Diet; verbalizes understanding; Date: 8/24/22  Recipe Modication; verbalizes understanding; Date: 8/17/22  Vegetarianism; verbalizes understanding; Date: 8/31/22  Vitamins; verbalizes understanding; Date: 8/3/22    Comments:   Discussed ways to incorporate healthy snacks, eating on a schedule, and monitoring sodium intake for heart health.    Diabetes  Is the patient diabetic? Yes   Other Core Components/Diabetes Assessment:   Labs:  Lab Results   Component Value Date    GLUF 189 (H) 06/23/2022    GLUF 121 (H) 12/11/2019    GLUF 106 04/07/2011     Lab Results   Component Value Date    HGBA1C 6.4 (H) 06/23/2022    HGBA1C 6.0 (H) 05/04/2022    HGBA1C 6.3 (H) 01/06/2022      Lab Results   Component Value Date    ESTIMATEDAVG 137 (H) 06/23/2022    ESTIMATEDAVG 126 05/04/2022    ESTIMATEDAVG 134 (H) 01/06/2022       History of diabetes since 2013  Diabetes medications: Glipizide 10mg BID, Basaglar insulin 22u @[facility], Trulicity injection 4.5mg weekly  Blood Glucose Checks at Home: Yes: random blood sugars  Typical morning range: 120-140 mg/dl  Endocrinologist or PCP following DM: Dr. Fox endocrinology    Other Core Components/Diabetes Plan:   Goals:  Hgb A1c < 7%  Exercise Blood Glucose: 100-300 mg/dl    Interventions:  Medication Compliance  Med Card Reconciled  Low Sodium, Mediterranean, ADA Diet  Weight Management  Physical Activity  Increase Knowledge of Contributory Factors  Home Monitoring    Education:  Diabetes; verbalizes understanding; Date: 8/29/22  Risk Factors; verbalizes understanding; Date: 8/5/22  Stress; verbalizes understanding; Date: 8/19/22  Mediterranean Diet; verbalizes understanding; Date: 8/24/22  Dining Out; verbalizes understanding; Date: 8/10/22    Comments:   Patient verbalizes understanding  to bring home glucometer and check glucose pre and post each exercise session.  Per cardiac rehab protocols, patient's glucose must be between 90 and 270 mg/dL to exercise.  Patient denies any recent glucose levels less than 60 mg/dL or greater than 300 mg/dL. Patient verbalizes importance of notifying rehab staff if symptoms of hypoglycemia occur while at cardiac rehab.  Abnormal labs will be reported to patient's PCP/Endocrinologist by rehab staff.    Noted updated parameters of 100-300    Encouraged checking glucose BID, once prior to HS and insulin use    Keila Mccarty MS, RDN/LDN     Session: 12 Session Follow Up Cardiac Rehab Individual Treatment Plan - Reassessment      Patient Name: Kaylee Romero MRN: [id number]   : 1945   Age: 77 y.o.   Primary Diagnosis: z95.1    Psychosocial Assessment:   Living Arrangements: Lives with spouse, son  Family Support: spouse  Self Reported: no change Effective Coping Skills  Displays: calmness  Medication: not applicable    Psychosocial Plan:   Goals:  Verbalizes coping mechanisms: In Progress  Maintain positive support system: Met  Maintain positive outlook: Met  Improve overall quality of life: In Progress    Comments on Goal Progression:  Pt is attending regularly and maintains positive outlook.    Interventions:  Patient to Self Report Emotional Changes at Session Check In  Recommend Physical Activity  Recommend Attending Education Lectures  Notify MD: No  Program Referral: No  Pharmaceutical Intervention/Therapy: No  Other Needs: not applicable  Stage of Readiness to Change: Action    Education:  Coping techniques, verbalizes understanding; Date:7/15/2022    Comments:  Pt denies any overwhelming stress or anxiety.  Patient has been instructed to notify staff in the event that circumstances worsen.  Patient verbalizes understanding.    Other Core Components/Risk Factors Assessment:   RISK FACTORS:  diabetes, hyperlipidemia, hypertension, obesity, positive  family history, sedentary lifestyle    Learning Barriers: Physical Condition    Medication Compliance: has been compliant with the medicaiton regimen    Other Core Components/Risk Factors Plan:   Goals:  Increase knowledge of CAD: In Progress  Weight loss: In Progress  Control diabetes by adjusting diet and exercise: In Progress  Learn more about healthy eating: In Progress    Comments on Goal Progression:  Pt continues to work on controlling risk factors.    Interventions:  Individual Education/ Counseling: Yes  Physician Referral: No    Education:    cardiac interventions, verbalizes understanding; Date: 7/15/2022  fluid overload/CHF, verbalizes understanding; Date: 7/29/2022  warning signs of MI, verbalizes understanding; Date: 7/15/2022         Education method adapted to patients education level and preferred method of learning.  Method: explanation  demonstration  handouts    Comments:  Pt encouraged to continue following the mediterranean diet and limiting her salt intake. Pt also encouraged to be more active at home on the days she is not in cardiac rehab.    Other Core Components/Hypertension Assessment:   BP Range: 112-158 systolic; 58-86 diastolic  BP at Goal: No  Patient reported symptoms: none    Other Core Components/Hypertension Plan:   Goals:  Blood Pressure <130/80    Comments on Goal Progression:  Pt encouraged to take her blood pressure daily at home and keep a log.    Interventions:  Med Card Reconciled: Yes  Encourage medication compliance  Encourage sodium reduction  Encourage weight loss  Recommend physical activity  Educate on contributory factors  Encourage home blood pressure monitoring  Recommend daily weights    Education:    Congestive Heart Failure; verbalizes understanding; Date: 7/15/2022         Comments:  Pt [facility] digital hypertensive program but has not been compliant in recording her blood pressure daily but states she does take it often at home and it is  low.    Does the patient have Heart Failure? No      Other Core Components/Tobacco Cessation Assessment:   Smoking Status: lifetime non-smoker  Smoking Cessation Barriers:  none  Stage of Readiness to Change: Maintenance    Other Core Components/Tobacco Cessation Plan:   Goals:  Maintain non-smoking status    Comments on Goal Progression:  Pt does not smoke    Interventions:  Maintains non-smoking status    Education:    Heart and Lung Anatomy; verbalizes understanding; Date: 7/15/2022  Benefits of Cardiac Rehab; verbalizes understanding; Date: 8/01/2022         Comments:  Pt has no desire to use tobacco products.    rFancisco Sanches, RN

## 2022-09-02 ENCOUNTER — CLINICAL SUPPORT (OUTPATIENT)
Dept: CARDIAC REHAB | Facility: CLINIC | Age: 77
End: 2022-09-02
Payer: MEDICARE

## 2022-09-02 DIAGNOSIS — Z95.1 POSTSURGICAL AORTOCORONARY BYPASS STATUS: Primary | ICD-10-CM

## 2022-09-02 DIAGNOSIS — I25.10 ATHEROSCLEROSIS OF NATIVE CORONARY ARTERY OF NATIVE HEART, UNSPECIFIED WHETHER ANGINA PRESENT: ICD-10-CM

## 2022-09-02 PROCEDURE — 93798 PR CARDIAC REHAB/MONITOR: ICD-10-PCS | Mod: S$GLB,,,

## 2022-09-02 PROCEDURE — 93798 PHYS/QHP OP CAR RHAB W/ECG: CPT | Mod: S$GLB,,,

## 2022-09-02 NOTE — PROGRESS NOTES
"  Session: 24 Session Follow Up   Cardiac Rehab Individual Treatment Plan - Reassessment      Patient Name: Kaylee Romero MRN: [id number]   : 1945   Age: 77 y.o.   Primary Diagnosis: S/P CABG (19)      Psychosocial Assessment:   Living Arrangements: Lives with spouse  Family Support: spouse  Self Reported:     Effective Coping Skills  Displays: calmness  Medication: not applicable    Psychosocial Plan:   Goals:  Reduce manifestation of chronic pain: In Progress  Maintain positive support system: In Progress  Maintain positive outlook: In Progress  Improve overall quality of life: In Progress    Comments on Goal Progression:  Patient feels she is making progress but fixated on "the progress her physicians think is enough to get her knee replaced".     Interventions:  Patient to Self Report Emotional Changes at Session Check In  Recommend Physical Activity  Recommend Attending Education Lectures  Notify MD: No  Program Referral: No  Pharmaceutical Intervention/Therapy: No  Other Needs: not applicable  Stage of Readiness to Change: Action    Education:  Stress management, verbalizes understanding; Date:22  Relaxation techniques, verbalizes understanding; Date:22  Stress, verbalizes understanding; Date:22    Comments:  Patient given positive reinforcement for her efforts in cardiac rehab. Patient verbalized understanding and that she is working hard so she can have her knee replaced. Reports her stress now is that she wants her knee replaced. Patient has been instructed to notify staff in the event that circumstances worsen.  Patient verbalizes understanding.    Other Core Components/Risk Factors Assessment:   RISK FACTORS:  diabetes, hyperlipidemia, hypertension, obesity, positive family history, sedentary lifestyle    Learning Barriers: None    Medication Compliance: has been compliant with the medicaiton regimen    Other Core Components/Risk Factors Plan:   Goals:  Increase knowledge of " "CAD: In Progress  Weight loss: In Progress  Control diabetes by adjusting diet and exercise: In Progress  Learn more about healthy eating: In Progress    Comments on Goal Progression:  Patient feels she is making progress.    Interventions:  Individual Education/ Counseling: Yes  Physician Referral: No    Education:    diabetes, verbalizes understanding; Date: 8/29/22  fluid overload/CHF, verbalizes understanding; Date: 7/29/22  hypertension, verbalizes understanding; Date: 8/12/22  nutrition, verbalizes understanding; Date: 8/24/22  physical activity, verbalizes understanding; Date: 8/15/22  risk factors, verbalizes understanding; Date: 6/30/22  sodium, verbalizes understanding; Date: 7/13/22  stress, verbalizes understanding; Date: 8/19/22         Education method adapted to patients education level and preferred method of learning.  Method: explanation  handouts    Comments:  Patient reports she walked for 30 minutes at a social function yesterday. Positive reinforcement given.     Other Core Components/Hypertension Assessment:   BP Range: 110-160/66-80  BP at Goal: Yes some isolated high readings  Patient reported symptoms: none    Other Core Components/Hypertension Plan:   Goals:  Blood Pressure <130/80    Comments on Goal Progression:  Patient reports some high readings here in cardiac rehab, reports "good" readings at home.     Interventions:  Med Card Reconciled: Yes  Encourage medication compliance  Encourage sodium reduction  Encourage weight loss  Recommend physical activity  Educate on contributory factors  Reduce stress, anxiety, anger, depression, and/or chronic pain  Encourage home blood pressure monitoring    Education:    Hypertension; verbalizes understanding; Date: 8/12/22  Congestive Heart Failure; verbalizes understanding; Date: 7/29/22  Risk Factors; verbalizes understanding; Date: 6/30/22  Stress; verbalizes understanding; Date: 8/19/22         Comments:  Patient     Does the patient have " "Heart Failure? NO      Other Core Components/Tobacco Cessation Assessment:   Smoking Status: lifetime non-smoker  Smoking Cessation Barriers:  NA  Stage of Readiness to Change: Maintenance    Other Core Components/Tobacco Cessation Plan:   Goals:  Maintain non-smoking status    Comments on Goal Progression:  Maintain nonsmoker status     Interventions:  Maintains non-smoking status    Education:    Heart and Lung Anatomy; verbalizes understanding; Date: 7/15/22  Risk Factors; verbalizes understanding; Date: 8/5/22  Hypertension; verbalizes understanding; Date: 8/12/22         Comments:  Patient maintained nonsmoker status. She is "working hard at rehab to be able to get my knee replaced, I just hope what I am doing is enough". Positive reinforcement given for patient efforts.    Caesar Seymour RN  Cardiac Rehab Nurse  "

## 2022-09-07 ENCOUNTER — CLINICAL SUPPORT (OUTPATIENT)
Dept: CARDIAC REHAB | Facility: CLINIC | Age: 77
End: 2022-09-07
Payer: MEDICARE

## 2022-09-07 DIAGNOSIS — Z95.1 POSTSURGICAL AORTOCORONARY BYPASS STATUS: Primary | ICD-10-CM

## 2022-09-07 PROCEDURE — 93798 PR CARDIAC REHAB/MONITOR: ICD-10-PCS | Mod: S$GLB,,, | Performed by: INTERNAL MEDICINE

## 2022-09-07 PROCEDURE — 93798 PHYS/QHP OP CAR RHAB W/ECG: CPT | Mod: S$GLB,,, | Performed by: INTERNAL MEDICINE

## 2022-09-09 ENCOUNTER — PATIENT MESSAGE (OUTPATIENT)
Dept: CARDIOLOGY | Facility: CLINIC | Age: 77
End: 2022-09-09
Payer: MEDICARE

## 2022-09-09 ENCOUNTER — CLINICAL SUPPORT (OUTPATIENT)
Dept: CARDIAC REHAB | Facility: CLINIC | Age: 77
End: 2022-09-09
Payer: MEDICARE

## 2022-09-09 DIAGNOSIS — Z95.1 POSTSURGICAL AORTOCORONARY BYPASS STATUS: Primary | ICD-10-CM

## 2022-09-09 DIAGNOSIS — I25.10 ATHEROSCLEROSIS OF NATIVE CORONARY ARTERY OF NATIVE HEART, UNSPECIFIED WHETHER ANGINA PRESENT: ICD-10-CM

## 2022-09-09 PROCEDURE — 93798 PR CARDIAC REHAB/MONITOR: ICD-10-PCS | Mod: S$GLB,,,

## 2022-09-09 PROCEDURE — 93798 PHYS/QHP OP CAR RHAB W/ECG: CPT | Mod: S$GLB,,,

## 2022-09-12 ENCOUNTER — CLINICAL SUPPORT (OUTPATIENT)
Dept: CARDIAC REHAB | Facility: CLINIC | Age: 77
End: 2022-09-12
Payer: MEDICARE

## 2022-09-12 DIAGNOSIS — Z95.1 POSTSURGICAL AORTOCORONARY BYPASS STATUS: Primary | ICD-10-CM

## 2022-09-12 DIAGNOSIS — I25.10 ATHEROSCLEROSIS OF NATIVE CORONARY ARTERY OF NATIVE HEART, UNSPECIFIED WHETHER ANGINA PRESENT: ICD-10-CM

## 2022-09-12 PROCEDURE — 93798 PR CARDIAC REHAB/MONITOR: ICD-10-PCS | Mod: S$GLB,,,

## 2022-09-12 PROCEDURE — 93798 PHYS/QHP OP CAR RHAB W/ECG: CPT | Mod: S$GLB,,,

## 2022-09-14 ENCOUNTER — CLINICAL SUPPORT (OUTPATIENT)
Dept: CARDIAC REHAB | Facility: CLINIC | Age: 77
End: 2022-09-14
Payer: MEDICARE

## 2022-09-14 DIAGNOSIS — Z95.1 POSTSURGICAL AORTOCORONARY BYPASS STATUS: Primary | ICD-10-CM

## 2022-09-14 DIAGNOSIS — I25.10 CORONARY ARTERY DISEASE INVOLVING NATIVE CORONARY ARTERY OF NATIVE HEART WITHOUT ANGINA PECTORIS: ICD-10-CM

## 2022-09-14 PROCEDURE — 93798 PR CARDIAC REHAB/MONITOR: ICD-10-PCS | Mod: S$GLB,,, | Performed by: INTERNAL MEDICINE

## 2022-09-14 PROCEDURE — 93798 PHYS/QHP OP CAR RHAB W/ECG: CPT | Mod: S$GLB,,, | Performed by: INTERNAL MEDICINE

## 2022-09-16 ENCOUNTER — CLINICAL SUPPORT (OUTPATIENT)
Dept: CARDIAC REHAB | Facility: CLINIC | Age: 77
End: 2022-09-16
Payer: MEDICARE

## 2022-09-16 DIAGNOSIS — I25.10 CORONARY ATHEROSCLEROSIS OF NATIVE CORONARY ARTERY: ICD-10-CM

## 2022-09-16 DIAGNOSIS — Z95.1 POSTSURGICAL AORTOCORONARY BYPASS STATUS: Primary | ICD-10-CM

## 2022-09-16 PROCEDURE — 93798 PR CARDIAC REHAB/MONITOR: ICD-10-PCS | Mod: S$GLB,,, | Performed by: INTERNAL MEDICINE

## 2022-09-16 PROCEDURE — 93798 PHYS/QHP OP CAR RHAB W/ECG: CPT | Mod: S$GLB,,, | Performed by: INTERNAL MEDICINE

## 2022-09-16 NOTE — PROGRESS NOTES
Patient arrived for cardiac rehab session. Pt reports to have eaten prior to exercise session.  The patient was on continuous EKG monitoring throughout the session. The patient tolerated exercise session well with no complaints.

## 2022-09-18 DIAGNOSIS — M81.0 OSTEOPOROSIS WITHOUT CURRENT PATHOLOGICAL FRACTURE, UNSPECIFIED OSTEOPOROSIS TYPE: ICD-10-CM

## 2022-09-19 ENCOUNTER — CLINICAL SUPPORT (OUTPATIENT)
Dept: CARDIAC REHAB | Facility: CLINIC | Age: 77
End: 2022-09-19
Payer: MEDICARE

## 2022-09-19 DIAGNOSIS — I25.10 ATHEROSCLEROSIS OF NATIVE CORONARY ARTERY OF NATIVE HEART WITHOUT ANGINA PECTORIS: ICD-10-CM

## 2022-09-19 DIAGNOSIS — Z95.1 POSTSURGICAL AORTOCORONARY BYPASS STATUS: Primary | ICD-10-CM

## 2022-09-19 PROCEDURE — 93798 PR CARDIAC REHAB/MONITOR: ICD-10-PCS | Mod: S$GLB,,, | Performed by: INTERNAL MEDICINE

## 2022-09-19 PROCEDURE — 93798 PHYS/QHP OP CAR RHAB W/ECG: CPT | Mod: S$GLB,,, | Performed by: INTERNAL MEDICINE

## 2022-09-19 RX ORDER — ALENDRONATE SODIUM 70 MG/1
TABLET ORAL
Qty: 12 TABLET | Refills: 0 | Status: SHIPPED | OUTPATIENT
Start: 2022-09-19 | End: 2022-12-15 | Stop reason: SDUPTHER

## 2022-09-19 NOTE — TELEPHONE ENCOUNTER
Refill Routing Note   Medication(s) are not appropriate for processing by [facility] for the following reason(s):      - Outside of protocol    [facility] action(s):  Route          Medication reconciliation completed: No     Appointments  past 12m or future 3m with PCP    Date Provider   Last Visit   6/27/2022 Liz Roberts MD   Next Visit   12/30/2022 Liz Roberts MD   ED visits in past 90 days: 0        Note composed:9:57 AM 09/19/2022

## 2022-09-21 ENCOUNTER — CLINICAL SUPPORT (OUTPATIENT)
Dept: CARDIAC REHAB | Facility: CLINIC | Age: 77
End: 2022-09-21
Payer: MEDICARE

## 2022-09-21 DIAGNOSIS — Z95.1 POSTSURGICAL AORTOCORONARY BYPASS STATUS: Primary | ICD-10-CM

## 2022-09-21 DIAGNOSIS — I25.10 ATHEROSCLEROSIS OF NATIVE CORONARY ARTERY OF NATIVE HEART WITHOUT ANGINA PECTORIS: ICD-10-CM

## 2022-09-21 PROCEDURE — 93798 PHYS/QHP OP CAR RHAB W/ECG: CPT | Mod: S$GLB,,, | Performed by: INTERNAL MEDICINE

## 2022-09-21 PROCEDURE — 93798 PR CARDIAC REHAB/MONITOR: ICD-10-PCS | Mod: S$GLB,,, | Performed by: INTERNAL MEDICINE

## 2022-09-22 ENCOUNTER — DOCUMENTATION ONLY (OUTPATIENT)
Dept: CARDIAC REHAB | Facility: CLINIC | Age: 77
End: 2022-09-22
Payer: MEDICARE

## 2022-09-22 NOTE — PROGRESS NOTES
Miss Page has completed 31 out of 36 exercise session of Phase II cardiac rehab.  A follow up reassessment will be completed at exit interview.     24 Session Follow Up   Cardiac Rehab Individual Treatment Plan - Reassessment      Patient Name: Kaylee Romero MRN: [id number]   : 1945   Age: 77 y.o.   Primary Diagnosis: CABG Date of Event: 19   EF: 60%  Risk Stratification: high  Referring Physician: MARGRET   Exercise Assessment:     Angina with exercise?: No   ST Depression with Exercise?: No  Fall Risk: Yes   Assistive Devices:  independent  There were some limitations noted by the patient due to chronic knee pain and is limited to the Nustep.  Comments on Progression: Miss Page is progressing well and her workloads will continue to be increased as she tolerates exercise intensity.     Exercise Plan:   Goals:  CR Exercise Goals: Attend Cardiac Rehab 3 times/week: In Progress  Home Aerobic Exercise: 2 additional days/week for 30-60 minutes: In Progress  Intensity of 12-15 on the Rate of Perceived Exertion (RPE) scale: In Progress  30% increase in entry estimated METS: 3.9 : In Progress  5 days/week for 30-60 minutes: In Progress    Comments on Goal Progression:  Patient Consistency: consistent with attendance  Home exercise? Yes: floor cycle; Frequency: 3 non-rehab days per week; Duration 50 minutes  Patient reports intensity rate 13-15 on RPE scale    Intervention/Recommendations:   Discussed importance of regular attendance to cardiac rehab class    Exercise Prescription:  THR Range    Mode: Nustep   Frequency:  3 days/week   Duration:  30-60 minutes   Intensity:  12-15 RPE   Resistance Training:  No: 0 lb weights with 10-15 reps based on strength and range of motion and adjusted accordingly     Home Prescription:  Mode Aerobic exercise   Frequency: 2- 3 days/week   Duration: 30-60 minutes   Resistance Training: None     Education:  Body Composition; verbalizes understanding; Date:  22  Diabetes; verbalizes understanding; Date: 22  Exercise and Rehydration; verbalizes understanding; Date: 8-15-22  Relaxation Techniques; verbalizes understanding; Date: 22    Comments:  I reviewed exercise recommendations with Miss Page.  I encouraged her to continue exercising and to try walking around her house.  She stated understanding.      The exercise prescription will continue to be adjusted based on tolerance of exercise intensity by patient.    Shan Junior, [facility] [place]     24 Session Follow Up   Cardiac Rehab Individual Treatment Plan - Reassessment    Patient Name: Kaylee Romero MRN: [id number]   : 1945   Age: 77 y.o.   Primary Diagnosis: s/p CABG, h/o MI, h/o TIA, CAD, HTN    Nutrition Assessment:     Anthropometrics    Height 63 inches   Weight unknown lbs   BMI unknown     Patient confirms she is taking lipitor 80mg for cholesterol control.  Difficulty Chewing or Swallowing: no  Current Exercise: See Exercise Physiologist Note  Food Safety/Food Preparation: spouse  Living Arrangements/Family Support: Lives with spouse  Cultural/Spiritual/Personal Preferences: not applicable   Barriers to Education: none identified  Stage of Change Related to Diet Habits: Action  Recent Changes to Diet: Yes - pt states she is eating more fish and paying attention to having more color variety with meals  Food Diary: Completed      Nutrition Plan:   Goals:  LDL-C < 70 (for high risk patients)  Hgb A1c < 7%  BMI < 25 and abdominal girth < 40M/<35 F  2 gram sodium, Mediterranean diet: In Progress  Rehab weight loss goal of 10 lbs, 1 lb per week: In Progress  Fish intake (non-fried varieties) to a goal of 2-3 servings per week: In Progress  Increase fruit and vegetable intake: In Progress    Comments on Goal Progression:  Pt eating a wader variety of produce    Interventions:  Dietitian Consult: No  Patient to participate in Cardiac Rehab sessions three times a week  Weekly Dietitian Weight  Check  Nutrition Recommendations Provided: verbal and written, Reviewed  Follow Up Plan for Ongoing Self-Management Support    Education:  Dining Out; verbalizes understanding; Date: 8/10/22  Mediterranean Diet; verbalizes understanding; Date: 8/24/22  Recipe Modication; verbalizes understanding; Date: 8/17/22  Vegetarianism; verbalizes understanding; Date: 8/31/22  Vitamins; verbalizes understanding; Date: 8/3/22    Comments:   Discussed ways to incorporate healthy snacks, eating on a schedule, and monitoring sodium intake for heart health.    Diabetes  Is the patient diabetic? Yes   Other Core Components/Diabetes Assessment:   Labs:  Lab Results   Component Value Date    GLUF 189 (H) 06/23/2022    GLUF 121 (H) 12/11/2019    GLUF 106 04/07/2011     Lab Results   Component Value Date    HGBA1C 6.4 (H) 06/23/2022    HGBA1C 6.0 (H) 05/04/2022    HGBA1C 6.3 (H) 01/06/2022      Lab Results   Component Value Date    ESTIMATEDAVG 137 (H) 06/23/2022    ESTIMATEDAVG 126 05/04/2022    ESTIMATEDAVG 134 (H) 01/06/2022       History of diabetes since 2013  Diabetes medications: Glipizide 10mg BID, Basaglar insulin 22u @[facility], Trulicity injection 4.5mg weekly  Blood Glucose Checks at Home: Yes: random blood sugars  Typical morning range: 120-140 mg/dl  Endocrinologist or PCP following DM: Dr. Fox endocrinology    Other Core Components/Diabetes Plan:   Goals:  Hgb A1c < 7%  Exercise Blood Glucose: 100-300 mg/dl    Interventions:  Medication Compliance  Med Card Reconciled  Low Sodium, Mediterranean, ADA Diet  Weight Management  Physical Activity  Increase Knowledge of Contributory Factors  Home Monitoring    Education:  Diabetes; verbalizes understanding; Date: 8/29/22  Risk Factors; verbalizes understanding; Date: 8/5/22  Stress; verbalizes understanding; Date: 8/19/22  Mediterranean Diet; verbalizes understanding; Date: 8/24/22  Dining Out; verbalizes understanding; Date: 8/10/22    Comments:   Patient verbalizes understanding  "to bring home glucometer and check glucose pre and post each exercise session.  Per cardiac rehab protocols, patient's glucose must be between 90 and 270 mg/dL to exercise.  Patient denies any recent glucose levels less than 60 mg/dL or greater than 300 mg/dL. Patient verbalizes importance of notifying rehab staff if symptoms of hypoglycemia occur while at cardiac rehab.  Abnormal labs will be reported to patient's PCP/Endocrinologist by rehab staff.    Noted updated parameters of 100-300    Encouraged checking glucose BID, once prior to HS and insulin use    Keila Mccarty MS, RDN/LDN       Session: 24 Session Follow Up   Cardiac Rehab Individual Treatment Plan - Reassessment      Patient Name: Kaylee Romero MRN: [id number]   : 1945   Age: 77 y.o.   Primary Diagnosis: S/P CABG (19)      Psychosocial Assessment:   Living Arrangements: Lives with spouse  Family Support: spouse  Self Reported:     Effective Coping Skills  Displays: calmness  Medication: not applicable    Psychosocial Plan:   Goals:  Reduce manifestation of chronic pain: In Progress  Maintain positive support system: In Progress  Maintain positive outlook: In Progress  Improve overall quality of life: In Progress    Comments on Goal Progression:  Patient feels she is making progress but fixated on "the progress her physicians think is enough to get her knee replaced".     Interventions:  Patient to Self Report Emotional Changes at Session Check In  Recommend Physical Activity  Recommend Attending Education Lectures  Notify MD: No  Program Referral: No  Pharmaceutical Intervention/Therapy: No  Other Needs: not applicable  Stage of Readiness to Change: Action    Education:  Stress management, verbalizes understanding; Date:22  Relaxation techniques, verbalizes understanding; Date:22  Stress, verbalizes understanding; Date:22    Comments:  Patient given positive reinforcement for her efforts in cardiac rehab. Patient " "verbalized understanding and that she is working hard so she can have her knee replaced. Reports her stress now is that she wants her knee replaced. Patient has been instructed to notify staff in the event that circumstances worsen.  Patient verbalizes understanding.    Other Core Components/Risk Factors Assessment:   RISK FACTORS:  diabetes, hyperlipidemia, hypertension, obesity, positive family history, sedentary lifestyle    Learning Barriers: None    Medication Compliance: has been compliant with the medicaiton regimen    Other Core Components/Risk Factors Plan:   Goals:  Increase knowledge of CAD: In Progress  Weight loss: In Progress  Control diabetes by adjusting diet and exercise: In Progress  Learn more about healthy eating: In Progress    Comments on Goal Progression:  Patient feels she is making progress.    Interventions:  Individual Education/ Counseling: Yes  Physician Referral: No    Education:    diabetes, verbalizes understanding; Date: 8/29/22  fluid overload/CHF, verbalizes understanding; Date: 7/29/22  hypertension, verbalizes understanding; Date: 8/12/22  nutrition, verbalizes understanding; Date: 8/24/22  physical activity, verbalizes understanding; Date: 8/15/22  risk factors, verbalizes understanding; Date: 6/30/22  sodium, verbalizes understanding; Date: 7/13/22  stress, verbalizes understanding; Date: 8/19/22         Education method adapted to patients education level and preferred method of learning.  Method: explanation  handouts    Comments:  Patient reports she walked for 30 minutes at a social function yesterday. Positive reinforcement given.     Other Core Components/Hypertension Assessment:   BP Range: 110-160/66-80  BP at Goal: Yes some isolated high readings  Patient reported symptoms: none    Other Core Components/Hypertension Plan:   Goals:  Blood Pressure <130/80    Comments on Goal Progression:  Patient reports some high readings here in cardiac rehab, reports "good" readings " "at home.     Interventions:  Med Card Reconciled: Yes  Encourage medication compliance  Encourage sodium reduction  Encourage weight loss  Recommend physical activity  Educate on contributory factors  Reduce stress, anxiety, anger, depression, and/or chronic pain  Encourage home blood pressure monitoring    Education:    Hypertension; verbalizes understanding; Date: 8/12/22  Congestive Heart Failure; verbalizes understanding; Date: 7/29/22  Risk Factors; verbalizes understanding; Date: 6/30/22  Stress; verbalizes understanding; Date: 8/19/22         Comments:  Patient     Does the patient have Heart Failure? NO      Other Core Components/Tobacco Cessation Assessment:   Smoking Status: lifetime non-smoker  Smoking Cessation Barriers:  NA  Stage of Readiness to Change: Maintenance    Other Core Components/Tobacco Cessation Plan:   Goals:  Maintain non-smoking status    Comments on Goal Progression:  Maintain nonsmoker status     Interventions:  Maintains non-smoking status    Education:    Heart and Lung Anatomy; verbalizes understanding; Date: 7/15/22  Risk Factors; verbalizes understanding; Date: 8/5/22  Hypertension; verbalizes understanding; Date: 8/12/22         Comments:  Patient maintained nonsmoker status. She is "working hard at rehab to be able to get my knee replaced, I just hope what I am doing is enough". Positive reinforcement given for patient efforts.    Caesar Seymour RN  Cardiac Rehab Nurse  "

## 2022-09-23 ENCOUNTER — CLINICAL SUPPORT (OUTPATIENT)
Dept: CARDIAC REHAB | Facility: CLINIC | Age: 77
End: 2022-09-23
Payer: MEDICARE

## 2022-09-23 DIAGNOSIS — Z95.1 POSTSURGICAL AORTOCORONARY BYPASS STATUS: Primary | ICD-10-CM

## 2022-09-23 DIAGNOSIS — I25.10 ATHEROSCLEROSIS OF NATIVE CORONARY ARTERY OF NATIVE HEART, UNSPECIFIED WHETHER ANGINA PRESENT: ICD-10-CM

## 2022-09-23 PROCEDURE — 93798 PHYS/QHP OP CAR RHAB W/ECG: CPT | Mod: S$GLB,,,

## 2022-09-23 PROCEDURE — 93798 PR CARDIAC REHAB/MONITOR: ICD-10-PCS | Mod: S$GLB,,,

## 2022-09-26 ENCOUNTER — LAB VISIT (OUTPATIENT)
Dept: SURGERY | Facility: CLINIC | Age: 77
End: 2022-09-26
Payer: MEDICARE

## 2022-09-26 ENCOUNTER — CLINICAL SUPPORT (OUTPATIENT)
Dept: CARDIAC REHAB | Facility: CLINIC | Age: 77
End: 2022-09-26
Payer: MEDICARE

## 2022-09-26 DIAGNOSIS — Z95.1 POSTSURGICAL AORTOCORONARY BYPASS STATUS: ICD-10-CM

## 2022-09-26 DIAGNOSIS — I25.10 CORONARY ARTERY DISEASE, UNSPECIFIED VESSEL OR LESION TYPE, UNSPECIFIED WHETHER ANGINA PRESENT, UNSPECIFIED WHETHER NATIVE OR TRANSPLANTED HEART: ICD-10-CM

## 2022-09-26 DIAGNOSIS — Z95.1 POSTSURGICAL AORTOCORONARY BYPASS STATUS: Primary | ICD-10-CM

## 2022-09-26 DIAGNOSIS — I25.10 ATHEROSCLEROSIS OF NATIVE CORONARY ARTERY OF NATIVE HEART, UNSPECIFIED WHETHER ANGINA PRESENT: ICD-10-CM

## 2022-09-26 LAB — SARS-COV-2 RNA RESP QL NAA+PROBE: NOT DETECTED

## 2022-09-26 PROCEDURE — U0005 INFEC AGEN DETEC AMPLI PROBE: HCPCS | Performed by: INTERNAL MEDICINE

## 2022-09-26 PROCEDURE — U0003 INFECTIOUS AGENT DETECTION BY NUCLEIC ACID (DNA OR RNA); SEVERE ACUTE RESPIRATORY SYNDROME CORONAVIRUS 2 (SARS-COV-2) (CORONAVIRUS DISEASE [COVID-19]), AMPLIFIED PROBE TECHNIQUE, MAKING USE OF HIGH THROUGHPUT TECHNOLOGIES AS DESCRIBED BY CMS-2020-01-R: HCPCS | Performed by: INTERNAL MEDICINE

## 2022-09-26 PROCEDURE — 93798 PHYS/QHP OP CAR RHAB W/ECG: CPT | Mod: S$GLB,,, | Performed by: INTERNAL MEDICINE

## 2022-09-26 PROCEDURE — 93798 PR CARDIAC REHAB/MONITOR: ICD-10-PCS | Mod: S$GLB,,, | Performed by: INTERNAL MEDICINE

## 2022-09-28 ENCOUNTER — CLINICAL SUPPORT (OUTPATIENT)
Dept: CARDIAC REHAB | Facility: CLINIC | Age: 77
End: 2022-09-28
Payer: MEDICARE

## 2022-09-28 DIAGNOSIS — Z95.1 POSTSURGICAL AORTOCORONARY BYPASS STATUS: Primary | ICD-10-CM

## 2022-09-28 DIAGNOSIS — I25.10 ATHEROSCLEROSIS OF NATIVE CORONARY ARTERY OF NATIVE HEART WITHOUT ANGINA PECTORIS: ICD-10-CM

## 2022-09-28 PROCEDURE — 93798 PR CARDIAC REHAB/MONITOR: ICD-10-PCS | Mod: S$GLB,,, | Performed by: INTERNAL MEDICINE

## 2022-09-28 PROCEDURE — 93798 PHYS/QHP OP CAR RHAB W/ECG: CPT | Mod: S$GLB,,, | Performed by: INTERNAL MEDICINE

## 2022-09-28 NOTE — PROGRESS NOTES
Session: Exit   Cardiac Rehab Individual Treatment Plan - Discharge Assessment      Patient Name: Kaylee Romero MRN: [id number]   : 1945   Age: 77 y.o.   Primary Diagnosis: CABG  Date of Event: 19  EF: 60%  Risk Stratification: high  Referring Physician: MARGRET   Exercise Assessment:     CPX/TM: Entry Results Exit Results    Date: 22 Date: 22   RHR 69 89   Max  129   Peak VO2 (CPX only) 9.2 8.8   Actual METS (CPX only) 2.63 2.51   Estimated METS 3.0 3.0     Anthropometrics Entry Results Exit Results   Height 62 inches 62 inches   Weight 234 lbs 235 lbs   BMI 43.0  43.1   Abdominal Girth 48.5 46   Body Composition 30.2% 30.5%     Angina with exercise?: No   ST Depression with Exercise?: No  Currently exercising at home? yes  foot ergometer ; Frequency: 5 to 6 days per week; Duration 50 minutes    Education:  Arthritis/Osteporosis; verbalizes understanding; Date: 22  Exercise Terminology; verbalizes understanding; Date: 22  Muscular Anatomy; verbalizes understanding; Date: 22    Rehab Exercise Goals:  Attend Cardiac Rehab 3 times/week: Met  Home Aerobic Exercise: 2 additional days/week for 30-60 minutes: Met  Intensity of 12-15 on the Rate of Perceived Exertion (RPE) scale: Met  30% increase in entry estimated METS: 3.9 : Not Met: 3.0    Comments: Miss Page attends class regularly and has progressed well.      Exercise Discharge Plan:     Intervention/Recommendations:       Recommended Home Prescription:  THR Range: 113-123   Mode: Aerobic   Frequency: 5-6 times per week   Duration: 30-60 minutes each day   Other Recs: 3-5 minute warm up and cool down/stretches   Resistance Trainin-3 days per week on non-consecutive days       Comments:    I met with Miss Page for an exit interview from the Phase II cardiac rehab program.  The entry and exit stress testing results were discussed as well as current and future exercise programs.     Patient's plan: Miss Page stated  she is continuing to use the foot ergometer and trying to walk.     I reviewed exercise recommendations with Miss Page and encouraged her to continue exercising.  We discussed the possibility of joining a gym.  She had aerobic improvement while in rehab class using a Nustep.  At home she is using a foot ergometer that is not intense enough to maintain the progress she made in the rehab program.  I asked Miss Page to call if she has any questions.  She stated understanding.    Taina Junior., CEP

## 2022-09-28 NOTE — PROGRESS NOTES
HISTORY: S/P CABG (8-12-19), CAD, DLP, HTN, HX OF MI, HX OF TIA, DM, EF=60%(8-24-21)    ANTHROPOMETRICS:     PRE POST   Abdominal girth (in) 48.5 46   Height (in) 62 62   Weight (lbs) 234 235   BMI 43.0 43.1   % Body Fat 30.2% 30.5%     EXERCISE RESULTS:     PRE POST   Peak VO2 (CPX only) 9.2 8.8   Actual METS (CPX only) 2.63 2.51   Estimated METS 3.0 3.0       HOME PRESCRIPTION: Kaylee Novoa.  You completed Cardiac Rehab.  Aerobic exercise frequency is recommended 5 to 6 times per week with a duration of 30 to 60 minutes.  Intensity should keep you in your target heart range of 113 to 123 beats per minute.  Include a 3 to 5 minute warm up and cool down stretches.  Resistance training is recommended 2 to 3 days per week on non-consecutive days.  Good luck to you.  Keep up the hard work, and it has been a pleasure working with you.      LAB RESULTS:    Lab Results   Component Value Date    HGB 11.6 (L) 09/29/2022    HGB 11.2 (L) 06/23/2022    HGB 10.4 (L) 06/21/2022     Lab Results   Component Value Date    HCT 36.8 (L) 09/29/2022    HCT 33.3 (L) 06/23/2022    HCT 31.9 (L) 06/21/2022     Lab Results   Component Value Date    MPV 10.0 09/29/2022    MPV 11.1 06/23/2022    MPV 10.3 06/21/2022       Lab Results   Component Value Date    CHOL 88 (L) 09/29/2022    CHOL 84 (L) 06/27/2022    CHOL 91 (L) 09/08/2021     Lab Results   Component Value Date    HDL 32 (L) 09/29/2022    HDL 33 (L) 06/27/2022    HDL 31 (L) 09/08/2021     Lab Results   Component Value Date    LDLCALC 39.4 (L) 09/29/2022    LDLCALC 38.2 (L) 06/27/2022    LDLCALC 42.8 (L) 09/08/2021     Lab Results   Component Value Date    TRIG 83 09/29/2022    TRIG 64 06/27/2022    TRIG 86 09/08/2021     Lab Results   Component Value Date    CHOLHDL 36.4 09/29/2022    CHOLHDL 39.3 06/27/2022    CHOLHDL 34.1 09/08/2021       Lab Results   Component Value Date    GLUF 83 09/29/2022    GLUF 189 (H) 06/23/2022    GLUF 121 (H) 12/11/2019     Lab Results    Component Value Date    HGBA1C 6.2 (H) 09/29/2022    HGBA1C 6.4 (H) 06/23/2022    HGBA1C 6.0 (H) 05/04/2022        Lab Results   Component Value Date    HSCRP 4.24 (H) 09/29/2022    HSCRP 2.09 06/23/2022    HSCRP 5.00 (H) 12/11/2019         LOLA SCORES:     PRE POST   Anxiety 1 0   Depression 1 1   Somatic 6 0   Hostility 0 0     SF-36 SCORES:     PRE POST   Physical Function 12 13   Social Function 11 8   Mental Health 30 30   Pain 4 6   Change in Health 3 5   Physical Role Limitation 0 0   Mental Role Limitation 3 3   Energy/Fatigue 10 10   Health Perceptions 12 22   Total Score 85 97     PHQ-9:    PHQ-9 Depression Patient Health Questionnaire 5/31/2022 6/30/2022 10/7/2022   Over the last two weeks how often have you been bothered by little interest or pleasure in doing things 0 2 0   Over the last two weeks how often have you been bothered by feeling down, depressed or hopeless 0 0 0   Over the last two weeks how often have you been bothered by trouble falling or staying asleep, or sleeping too much - 0 0   Over the last two weeks how often have you been bothered by feeling tired or having little energy - 3 1   Over the last two weeks how often have you been bothered by a poor appetite or overeating - 0 0   Over the last two weeks how often have you been bothered by feeling bad about yourself - or that you are a failure or have let yourself or your family down - 0 0   Over the last two weeks how often have you been bothered by trouble concentrating on things, such as reading the newspaper or watching television - 0 0   Over the last two weeks how often have you been bothered by moving or speaking so slowly that other people could have noticed. - 2 0   Over the last two weeks how often have you been bothered by thoughts that you would be better off dead, or of hurting yourself - 0 0   If you checked off any problems, how difficult have these problems made it for you to do your work, take care of things at  home or get along with other people? - Very difficult Not difficult at all   Total Score - 7 1                  EDUCATION SCORES:     PRE POST   Education Score 50 90

## 2022-09-29 ENCOUNTER — HOSPITAL ENCOUNTER (OUTPATIENT)
Dept: CARDIOLOGY | Facility: HOSPITAL | Age: 77
Discharge: HOME | End: 2022-09-29
Attending: INTERNAL MEDICINE
Payer: MEDICARE

## 2022-09-29 VITALS
HEART RATE: 89 BPM | SYSTOLIC BLOOD PRESSURE: 115 MMHG | HEIGHT: 62 IN | DIASTOLIC BLOOD PRESSURE: 62 MMHG | BODY MASS INDEX: 43.32 KG/M2 | WEIGHT: 235.44 LBS

## 2022-09-29 DIAGNOSIS — I25.10 ATHEROSCLEROSIS OF NATIVE CORONARY ARTERY OF NATIVE HEART, UNSPECIFIED WHETHER ANGINA PRESENT: ICD-10-CM

## 2022-09-29 DIAGNOSIS — Z95.1 POSTSURGICAL AORTOCORONARY BYPASS STATUS: ICD-10-CM

## 2022-09-29 LAB
CV STRESS BASE HR: 89 BPM
DIASTOLIC BLOOD PRESSURE: 62 MMHG
OHS CV CPX 1 MINUTE RECOVERY HEART RATE: 98 BPM
OHS CV CPX 85 PERCENT MAX PREDICTED HEART RATE MALE: 119
OHS CV CPX ABDOMINAL GIRTH: 46 CM
OHS CV CPX DATA GRADE - PEAK: 3
OHS CV CPX DATA O2 SAT - PEAK: 96
OHS CV CPX DATA O2 SAT - REST: 98
OHS CV CPX DATA SPEED - PEAK: 1.6
OHS CV CPX DATA TIME - PEAK: 4.72
OHS CV CPX DATA VE/VCO2 - PEAK: 58
OHS CV CPX DATA VE/VO2 - PEAK: 49
OHS CV CPX DATA VO2 - PEAK: 8.8
OHS CV CPX DATA VO2 - REST: 2.8
OHS CV CPX ESTIMATED METS: 3
OHS CV CPX FEV1/FVC: 0.84
OHS CV CPX FORCED EXPIRATORY VOLUME: 1.92
OHS CV CPX FORCED VITAL CAPACITY (FVC): 2.28
OHS CV CPX HIGHEST VO: 25.1
OHS CV CPX MAX PREDICTED HEART RATE: 140
OHS CV CPX MAXIMAL VOLUNTARY VENTILATION (MVV) PREDICTED: 76.8
OHS CV CPX MAXIMAL VOLUNTARY VENTILATION (MVV): 75
OHS CV CPX MAXIUMUM EXERCISE VENTILATION (VE MAX): 39.8
OHS CV CPX PATIENT AGE: 75
OHS CV CPX PATIENT HEIGHT IN: 62
OHS CV CPX PATIENT IS FEMALE AGE 11-19: 0
OHS CV CPX PATIENT IS FEMALE AGE GREATER THAN 19: 1
OHS CV CPX PATIENT IS FEMALE AGE LESS THAN 11: 0
OHS CV CPX PATIENT IS FEMALE: 1
OHS CV CPX PATIENT IS MALE AGE 11-25: 0
OHS CV CPX PATIENT IS MALE AGE GREATER THAN 25: 0
OHS CV CPX PATIENT IS MALE AGE LESS THAN 11: 0
OHS CV CPX PATIENT IS MALE GREATER THAN 18: 0
OHS CV CPX PATIENT IS MALE LESS THAN OR EQUAL TO 18: 0
OHS CV CPX PATIENT IS MALE: 0
OHS CV CPX PATIENT WEIGHT RETURNED IN OZ: 3767.22
OHS CV CPX PEAK DIASTOLIC BLOOD PRESSURE: 84 MMHG
OHS CV CPX PEAK HEAR RATE: 129 BPM
OHS CV CPX PEAK RATE PRESSURE PRODUCT: NORMAL
OHS CV CPX PEAK SYSTOLIC BLOOD PRESSURE: 164 MMHG
OHS CV CPX PERCENT BODY FAT: 30.5
OHS CV CPX PERCENT MAX PREDICTED HEART RATE ACHIEVED: 92
OHS CV CPX PREDICTED VO2: 25.1 ML/KG/MIN
OHS CV CPX RATE PRESSURE PRODUCT PRESENTING: NORMAL
OHS CV CPX REST PET CO2: 22
OHS CV CPX VE/VCO2 SLOPE: 61.6
STRESS ECHO POST EXERCISE DUR MIN: 4 MINUTES
STRESS ECHO POST EXERCISE DUR SEC: 43 SECONDS
SYSTOLIC BLOOD PRESSURE: 115 MMHG

## 2022-09-29 PROCEDURE — 94621 CARDIOPULM EXERCISE TESTING: CPT | Mod: 26,,, | Performed by: INTERNAL MEDICINE

## 2022-09-29 PROCEDURE — 94621 CARDIOPULMONARY EXERCISE TESTING (CUPID ONLY): ICD-10-PCS | Mod: 26,,, | Performed by: INTERNAL MEDICINE

## 2022-09-29 PROCEDURE — 94621 CARDIOPULM EXERCISE TESTING: CPT

## 2022-10-03 ENCOUNTER — PATIENT MESSAGE (OUTPATIENT)
Dept: CARDIOLOGY | Facility: CLINIC | Age: 77
End: 2022-10-03
Payer: MEDICARE

## 2022-10-05 ENCOUNTER — PATIENT MESSAGE (OUTPATIENT)
Dept: CARDIOLOGY | Facility: CLINIC | Age: 77
End: 2022-10-05
Payer: MEDICARE

## 2022-10-06 ENCOUNTER — TELEPHONE (OUTPATIENT)
Dept: CARDIAC REHAB | Facility: CLINIC | Age: 77
End: 2022-10-06
Payer: MEDICARE

## 2022-10-06 ENCOUNTER — PATIENT MESSAGE (OUTPATIENT)
Dept: OTHER | Facility: OTHER | Age: 77
End: 2022-10-06
Payer: MEDICARE

## 2022-10-06 NOTE — PROGRESS NOTES
Exit   Cardiac Rehab Individual Treatment Plan - Discharge Assessment      Patient Name: Kaylee Romero MRN: [id number]   : 1945   Age: 77 y.o.   Primary Diagnosis: s/p CABG, CAD, HTN, h/o MI, DM, h/o TIA    Nutrition Assessment:     Anthropometrics Pre Post   Height 62 inches 62 inches   Weight 234 lbs 235 lbs   BMI 43 43.1   Abdominal Girth 48.5 46   Body Composition 30.2 30.5       Labs:  Patient confirms she is taking lipitor 80mg for cholesterol control.    Lab Results   Component Value Date    CHOL 88 (L) 2022    CHOL 84 (L) 2022    CHOL 91 (L) 2021     Lab Results   Component Value Date    HDL 32 (L) 2022    HDL 33 (L) 2022    HDL 31 (L) 2021     Lab Results   Component Value Date    LDLCALC 39.4 (L) 2022    LDLCALC 38.2 (L) 2022    LDLCALC 42.8 (L) 2021     Lab Results   Component Value Date    TRIG 83 2022    TRIG 64 2022    TRIG 86 2021     Lab Results   Component Value Date    CHOLHDL 36.4 2022    CHOLHDL 39.3 2022    CHOLHDL 34.1 2021       Lab Results   Component Value Date    GLUF 83 2022     Lab Results   Component Value Date    HGBA1C 6.2 (H) 2022       Nutrition/Diet History:  Patient eats 2-3 meals daily.    Seasons food with salt substitute.  Patient denies use of a salt shaker at the table on prepared foods.   Dines out 2 per month.  Chooses fried foods rarely  Chooses fish 2 time(s) per week.   Beverages:  water  Alcohol: none  Current Exercise: See Exercise Physiologist Note  Food Safety/Food Preparation: spouse  Living Arrangements/Family Support: Lives with spouse  Stage of Change Related to Diet Habits: Action  Recent Changes to Diet: No  Food Diary: Completed      Nutrition Plan:   Goals:  LDL-C < 70 (for high risk patients): Met  2 gram sodium, Mediterranean diet: Met  Rehab weight loss goal of 10 lbs, 1 lb per week: Not Met: pt lost 2 inches off abdominal  Increase fish intake  (non-fried varieties) to a goal of 2-3 servings per week: Met  Increase fruit and vegetable intake: Met    Comments of Goal Progression:  Pt asked numerous questions and very eager to learn and improve. Pt states she is feeling much better  Interventions/Recommendations:  Reviewed Anthropometric Progress  Reviewed Pre and Post Labs  Dietitian Consult: No  Nutrition Recommendations Provided: Verbal, Reviewed  Discussed follow up plan for ongoing self-management support    Education:  Cholesterol and Fat; verbalizes understanding; Date: 9/21/22  Food Labels; verbalizes understanding; Date: 9/7/22  Weight Management; verbalizes understanding; Date: 9/14/22  Pre/Probiotics; verbalizes understanding; Date: 9/28/22    Comments:   Discussed ways to continue to incorporate healthy snacks, eating on a schedule, and monitoring sodium intake for heart health.    Diabetes  Is the patient diabetic? Yes   Other Core Components/Diabetes Assessment:   Labs:  Lab Results   Component Value Date    GLUF 83 09/29/2022    GLUF 189 (H) 06/23/2022    GLUF 121 (H) 12/11/2019     Lab Results   Component Value Date    HGBA1C 6.2 (H) 09/29/2022    HGBA1C 6.4 (H) 06/23/2022    HGBA1C 6.0 (H) 05/04/2022      Lab Results   Component Value Date    ESTIMATEDAVG 131 09/29/2022    ESTIMATEDAVG 137 (H) 06/23/2022    ESTIMATEDAVG 126 05/04/2022       History of diabetes since 2013  Diabetes medications: Glipizide 10mg BID, Trulicity injection 4.5mg weekly, Basaglar 22u @[facility]  Blood Glucose Checks at Home: No  Endocrinologist or PCP following DM: Dr. Fox endocrinology    Other Core Components/Diabetes Plan:   Goals:  Hgb A1c < 7%  Exercise Blood Glucose: 100-300mg/dl    Interventions:  Reviewed and Discussed Labs  Medication Compliance  Med Card Reconciled  Low Sodium, Mediterranean, ADA Diet  Weight Management  Physical Activity  Increase Knowledge of Contributory Factors  Home Monitoring    Education:  Medications II; verbalizes understanding; Date:  9/2/22  Food Labels; verbalizes understanding; Date: 9/7/22  Weight Management; verbalizes understanding; Date: 9/14/22    Comments:     A1c remains below 7.0  Emphasized importance of regular glucose monitoring especially prior to HS insulin    Keila Mccarty MS, RDN/LDN

## 2022-10-07 ENCOUNTER — CLINICAL SUPPORT (OUTPATIENT)
Dept: CARDIAC REHAB | Facility: CLINIC | Age: 77
End: 2022-10-07
Payer: MEDICARE

## 2022-10-07 DIAGNOSIS — Z95.1 POSTSURGICAL AORTOCORONARY BYPASS STATUS: Primary | ICD-10-CM

## 2022-10-07 DIAGNOSIS — I25.10 CORONARY ARTERY DISEASE, UNSPECIFIED VESSEL OR LESION TYPE, UNSPECIFIED WHETHER ANGINA PRESENT, UNSPECIFIED WHETHER NATIVE OR TRANSPLANTED HEART: ICD-10-CM

## 2022-10-07 PROCEDURE — 99999 PR PBB SHADOW E&M-EST. PATIENT-LVL III: CPT | Mod: PBBFAC,,,

## 2022-10-07 PROCEDURE — 99999 PR PBB SHADOW E&M-EST. PATIENT-LVL III: ICD-10-PCS | Mod: PBBFAC,,,

## 2022-10-07 PROCEDURE — 93798 PHYS/QHP OP CAR RHAB W/ECG: CPT | Mod: S$GLB,,, | Performed by: INTERNAL MEDICINE

## 2022-10-07 PROCEDURE — 93798 PR CARDIAC REHAB/MONITOR: ICD-10-PCS | Mod: S$GLB,,, | Performed by: INTERNAL MEDICINE

## 2022-10-07 NOTE — PROGRESS NOTES
Session: Exit   Cardiac Rehab Individual Treatment Plan - Discharge Assessment      Patient Name: Kaylee Romero MRN: [id number]   : 1945   Age: 77 y.o.   Diagnosis: S/P CABG in 2019    Psychosocial Assessment:   Outcome Survey Tools:    LOLA SCORES:   PRE POST   Anxiety 1 0   Depression 1 1   Somatic 6 0   Hostility 0 0     SF-36 SCORES:   PRE POST   Physical Function 12 13   Social Function 11 8   Mental Health 30 30   Pain 4 6   Change in Health 3 5   Physical Role Limitation 0 0   Mental Role Limitation 3 3   Energy/Fatigue 10 10   Health Perceptions 12 22   Total Score 85 97     PHQ 9:  PHQ-9 Depression Patient Health Questionnaire 2022 2022 10/7/2022   Over the last two weeks how often have you been bothered by little interest or pleasure in doing things 0 2 0   Over the last two weeks how often have you been bothered by feeling down, depressed or hopeless 0 0 0   Over the last two weeks how often have you been bothered by trouble falling or staying asleep, or sleeping too much - 0 0   Over the last two weeks how often have you been bothered by feeling tired or having little energy - 3 1   Over the last two weeks how often have you been bothered by a poor appetite or overeating - 0 0   Over the last two weeks how often have you been bothered by feeling bad about yourself - or that you are a failure or have let yourself or your family down - 0 0   Over the last two weeks how often have you been bothered by trouble concentrating on things, such as reading the newspaper or watching television - 0 0   Over the last two weeks how often have you been bothered by moving or speaking so slowly that other people could have noticed. - 2 0   Over the last two weeks how often have you been bothered by thoughts that you would be better off dead, or of hurting yourself - 0 0   If you checked off any problems, how difficult have these problems made it for you to do your work, take care of things at home  or get along with other people? - Very difficult Not difficult at all   Total Score - 7 1                  Family Support: spouse  Self Reported: Effective Coping Skills  Displays: happiness and calmness    Psychosocial Plan:   Goals:  Reduce manifestation of chronic pain: In Progress  Maintain positive support system: Met  Maintain positive outlook: Met  Improve overall quality of life: Met    Comments on Goal Progression:  Patient reports her overall chronic pain from her knee has improved but still has some pain at times.    Interventions/Recommendations:  Discussed Results of Surveys: Yes  Notify MD: No  Program Referral: No  Pharmaceutical Intervention/Therapy: No  Physical Activity: Yes  Continue Patient Education: Yes  Other Needs: not applicable  Stage of Readiness to Change: Action    Education:  Stress Management; verbalizes understanding; Date: 8/19/22  Relaxation Techniques; verbalizes understanding; Date: 8/22/22  Depression; verbalizes understanding; Date: 9/23/22  Stress; verbalizes understanding; Date: 8/19/22  Risk Factors; verbalizes understanding; Date: 8/5/22    Comments:  Patient scores were improved from beginning of cardiac rehab. She reports she is moving around more, increased stamina, more energy.  Patient has been instructed to seek attention via PCP/Psychiatry in the event that circumstances change. Patient verbalizes understanding.     Other Core Components/Risk Factors Assessment:   RISK FACTORS:  diabetes, hyperlipidemia, hypertension, obesity, positive family history, sedentary lifestyle    Learning Barriers: None    Education Level:  Post-College Graduate Degree    Pre-Test Score Post-Test Score   50 90     Medication Compliance: has been compliant with taking medications    Other Core Components/Risk Factors Plan:   Goals:  Increase knowledge of CAD: Met  Weight loss: Not Met: pt had 1 pound weight gain but lost 1.5 inches in abdominal girth  Control diabetes by adjusting diet and  exercise: Met  Learn more about healthy eating: Met    Interventions/Recommendations:  Individual Education/Counseling: Yes  Physician Referral: No    Education:    blood pressure meds, verbalizes understanding; Date: 9/2/22  diabetes, verbalizes understanding; Date: 8/29/22  fluid overload/CHF, verbalizes understanding; Date: 7/29/22  hypertension, verbalizes understanding; Date: 8/12/22  nutrition, verbalizes understanding; Date: 8/24/22  physical activity, verbalizes understanding; Date: 7/11/22  risk factors, verbalizes understanding; Date: 8/5/22  sodium, verbalizes understanding; Date: 7/13/22  stress, verbalizes understanding; Date: 8/19/22         Comments:  Patient was consistent with attendance, asking questions, and trying new things to improve her cardiac health.    Other Core Components/Hypertension Assessment:   BP Range: 110-142/70-86  Patient reported symptoms: none    Other Core Components/Hypertension Plan:   Goals:  Blood Pressure <130/80: Met some isolated high readings.     Interventions/Recommendations:  Med Card Reconciled: Yes  Encourage medication compliance  Encourage sodium reduction  Encourage weight loss  Recommend physical activity  Educate on contributory factors  Reduce stress, anxiety, anger, depression, and/or chronic pain  Encourage home blood pressure monitoring    Education:    Hypertension; verbalizes understanding; Date: 8/12/22  Congestive Heart Failure; verbalizes understanding; Date: 7/29/22  Risk Factors; verbalizes understanding; Date: 8/5/22  Medication II; verbalizes understanding; Date: 9/2/22  Stroke; verbalizes understanding; Date: 9/16/22  Stress; verbalizes understanding; Date: 8/19/22         Comments:  See monthly report in Epic for blood pressure trends  Information given to patient for [facility]: No  Patient reports she has a bicycle at home she uses and a treadmill she is planning on starting to use.    Does the patient have Heart Failure?  No    Other Core Components/Tobacco Cessation Assessment:   Smoking Status: lifetime non-smoker  Smoking Cessation Barriers:  NA  Stage of Readiness to Change: Maintenance    Other Core Components/Tobacco Cessation Plan:   Goals:  Maintain non-smoking status: Met    Interventions/Recommendations:  Maintains non-smoking status    Education:    Heart and Lung Anatomy; verbalizes understanding; Date: 7/15/22  Risk Factors; verbalizes understanding; Date: 8/5/22  Hypertension; verbalizes understanding; Date: 8/12/22  Medications II; verbalizes understanding; Date: 9/2/22  Stroke; verbalizes understanding; Date: 9/16/22  Benefits of Cardiac Rehab; verbalizes understanding; Date: 9/9/22         Comments:  Patient is very pleased with her improved stamina and energy she gained from doing cardiac rehab. She worked very hard during cardiac rehab with the goal of being able to have her knee replacement.  Patient has been instructed to follow up with Cardiologist for any further cardiac issues. Information on [facility] at [facility] given to patient.       Caesar Seymour RN  Cardiac Rehab Nurse

## 2022-10-11 DIAGNOSIS — Z71.3 NUTRITIONAL COUNSELING: Primary | ICD-10-CM

## 2022-10-20 ENCOUNTER — PATIENT MESSAGE (OUTPATIENT)
Dept: ENDOCRINOLOGY | Facility: CLINIC | Age: 77
End: 2022-10-20
Payer: MEDICARE

## 2022-10-20 ENCOUNTER — PATIENT MESSAGE (OUTPATIENT)
Dept: NUTRITION | Facility: CLINIC | Age: 77
End: 2022-10-20
Payer: MEDICARE

## 2022-10-20 DIAGNOSIS — E11.65 UNCONTROLLED TYPE 2 DIABETES MELLITUS WITH HYPERGLYCEMIA: Primary | ICD-10-CM

## 2022-10-21 ENCOUNTER — PATIENT MESSAGE (OUTPATIENT)
Dept: CARDIOLOGY | Facility: CLINIC | Age: 77
End: 2022-10-21
Payer: MEDICARE

## 2022-10-24 DIAGNOSIS — Z95.1 HX OF CABG: Primary | ICD-10-CM

## 2022-10-24 DIAGNOSIS — N18.31 CHRONIC KIDNEY DISEASE, STAGE 3A: ICD-10-CM

## 2022-10-27 ENCOUNTER — PATIENT MESSAGE (OUTPATIENT)
Dept: DIABETES | Facility: CLINIC | Age: 77
End: 2022-10-27
Payer: MEDICARE

## 2022-10-28 ENCOUNTER — PES CALL (OUTPATIENT)
Dept: ADMINISTRATIVE | Facility: CLINIC | Age: 77
End: 2022-10-28
Payer: MEDICARE

## 2022-10-31 ENCOUNTER — TELEPHONE (OUTPATIENT)
Dept: DIABETES | Facility: CLINIC | Age: 77
End: 2022-10-31
Payer: MEDICARE

## 2022-11-01 ENCOUNTER — PES CALL (OUTPATIENT)
Dept: ADMINISTRATIVE | Facility: OTHER | Age: 77
End: 2022-11-01
Payer: MEDICARE

## 2022-11-04 NOTE — TELEPHONE ENCOUNTER
Attempted to contact MsTlNick in regards to getting in for an appt, but the pt did not answer. Also could not leave a message on the phone number provided.    no

## 2022-11-10 ENCOUNTER — PES CALL (OUTPATIENT)
Dept: ADMINISTRATIVE | Facility: CLINIC | Age: 77
End: 2022-11-10
Payer: MEDICARE

## 2022-11-24 ENCOUNTER — PATIENT MESSAGE (OUTPATIENT)
Dept: CARDIOLOGY | Facility: CLINIC | Age: 77
End: 2022-11-24
Payer: MEDICARE

## 2022-11-29 ENCOUNTER — NUTRITION (OUTPATIENT)
Dept: NUTRITION | Facility: CLINIC | Age: 77
End: 2022-11-29
Payer: MEDICARE

## 2022-11-29 DIAGNOSIS — Z95.1 HX OF CABG: ICD-10-CM

## 2022-11-29 DIAGNOSIS — N18.31 CHRONIC KIDNEY DISEASE, STAGE 3A: ICD-10-CM

## 2022-11-29 PROCEDURE — 99999 PR PBB SHADOW E&M-EST. PATIENT-LVL I: ICD-10-PCS | Mod: PBBFAC,,, | Performed by: NUTRITIONIST

## 2022-11-29 PROCEDURE — 99999 PR PBB SHADOW E&M-EST. PATIENT-LVL I: CPT | Mod: PBBFAC,,, | Performed by: NUTRITIONIST

## 2022-11-29 NOTE — PROGRESS NOTES
"Nutrition Assessment for Initial Medical Nutrition Therapy      Reason for MNT visit: Pt in for education and nutrition counseling regarding T2DM.       ASSESSMENT    Age: 77 y.o.  Wt: 235 lbs  Wt Readings from Last 1 Encounters:   09/29/22 106.8 kg (235 lb 7.2 oz)     Ht: 5'2"  Ht Readings from Last 1 Encounters:   09/29/22 5' 2" (1.575 m)     BMI: 43.06  BMI Readings from Last 1 Encounters:   09/29/22 43.06 kg/m²       Clinical Signs/Symptoms: I saw Kaylee in Feb 2019. Our goal is to lose 35 pounds, increase metabolism and Energy while being on an anti-inflammatory diet for the most part    Medical History:   Past Medical History:   Diagnosis Date    Acid reflux     Age-related osteoporosis without current pathological fracture 1/11/2019    Cataract     CKD (chronic kidney disease) stage 3, GFR 30-59 ml/min     Dry mouth     History of TIA (transient ischemic attack) 12/11/2018    Hyperlipidemia 12/2/2014    Hypertensive heart disease with diastolic heart failure 11/28/2012    Morbid obesity with body mass index (BMI) of 40.0 or higher 5/9/2016    KAMRAN (obstructive sleep apnea)     KAMRAN on CPAP 6/28/2016    Osteoporosis without current pathological fracture 11/11/2018    Physical deconditioning 11/5/2018    Status post total left knee replacement 5/1/2017 5/16/2017    Type 2 diabetes mellitus with stage 3 chronic kidney disease, without long-term current use of insulin 5/16/2019     Problem List             Resolved    Osteoarthritis of knee         Essential hypertension (Chronic)         Dyslipidemia, goal LDL below 70         Morbid obesity with body mass index (BMI) of 40.0 or higher         KAMRAN on CPAP         Physical deconditioning         Osteoporosis without current pathological fracture (Chronic)         History of TIA (transient ischemic attack)         Age-related osteoporosis without current pathological fracture         Vitamin D deficiency, unspecified         Type 2 diabetes mellitus with stage 3 " "chronic kidney disease, without long-term current use of insulin (Chronic)         History of MI (myocardial infarction)         Coronary 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 pectoris         S/P CABG (coronary artery bypass graft)         Shoulder pain         Chest pain         Physical debility         GRANADO (dyspnea on exertion)         S/P drug eluting coronary stent placement         Decreased range of motion of left knee         Chronic pain of right knee         Antalgic gait         Abnormal stress test         Aortic atherosclerosis         Chronic heart failure with preserved ejection fraction            Medications:   Current Outpatient Medications:     acetaminophen (TYLENOL) 500 MG tablet, Take 500 mg by mouth 2 (two) times daily as needed for Pain., Disp: , Rfl:     alendronate (FOSAMAX) 70 MG tablet, TAKE 1 TABLET(70 MG) BY MOUTH EVERY 7 DAYS, Disp: 12 tablet, Rfl: 0    amLODIPine (NORVASC) 10 MG tablet, TAKE 1 TABLET(10 MG) BY MOUTH EVERY DAY, Disp: 90 tablet, Rfl: 2    ammonium lactate (LAC-HYDRIN) 12 % lotion, Apply topically as needed for Dry Skin. On the feet and toenails., Disp: 225 g, Rfl: 6    aspirin 81 MG Chew, Take 1 tablet (81 mg total) by mouth once daily., Disp: , Rfl: 0    atorvastatin (LIPITOR) 80 MG tablet, TAKE 1 TABLET(80 MG) BY MOUTH EVERY DAY, Disp: 90 tablet, Rfl: 3    BD BOO 2ND GEN PEN NEEDLE 32 gauge x 5/32" Ndle, USE EVERY DAY, Disp: 100 each, Rfl: 8    blood sugar diagnostic Strp, 1 strip by Misc.(Non-Drug; Combo Route) route once daily., Disp: 100 strip, Rfl: 3    carvediloL (COREG) 25 MG tablet, TAKE 1 TABLET(25 MG) BY MOUTH TWICE DAILY WITH MEALS, Disp: 180 tablet, Rfl: 1    clopidogreL (PLAVIX) 75 mg tablet, TAKE 1 TABLET(75 MG) BY MOUTH EVERY DAY, Disp: 90 tablet, Rfl: 3    COD LIVER OIL ORAL, TAKE 2 CAPSULES BY MOUTH EVERY MORNING AND 1 CAPSULE EVERY EVENING, Disp: , Rfl:     diclofenac sodium (VOLTAREN) 1 % Gel, Apply 2 g topically 4 " (four) times daily as needed. To painful area on the feet. (Patient taking differently: Apply 2 g topically 4 (four) times daily as needed. To painful area on the feet and knee), Disp: 500 g, Rfl: 5    diphenoxylate-atropine 2.5-0.025 mg (LOMOTIL) 2.5-0.025 mg per tablet, TAKE 1 TABLET BY MOUTH FOUR TIMES DAILY AS NEEDED FOR DIARRHEA, Disp: 30 tablet, Rfl: 30    donepeziL (ARICEPT) 5 MG tablet, TAKE 1 TABLET(5 MG) BY MOUTH EVERY EVENING FOR MEMORY, Disp: 90 tablet, Rfl: 2    dulaglutide (TRULICITY) 4.5 mg/0.5 mL pen injector, Inject 4.5 mg into the skin every 7 days. (Patient taking differently: Inject 4.5 mg into the skin every Sunday.), Disp: 2 mL, Rfl: 3    FEROSUL 325 mg (65 mg iron) Tab tablet, TAKE 1 TABLET BY MOUTH DAILY, Disp: 90 tablet, Rfl: 1    furosemide (LASIX) 20 MG tablet, Take 20 mg by mouth every Sat, Sun, Mon, Wed, & Fri, Disp: 30 tablet, Rfl: 3    glipiZIDE (GLUCOTROL) 5 MG tablet, Take 2 tablets (10 mg total) by mouth 2 (two) times daily with meals. TAKE 2 TABLETS BY MOUTH DAILY WITH DINNER OR EVENING MEAL (Patient taking differently: Take 10 mg by mouth 2 (two) times daily with meals.), Disp: 120 tablet, Rfl: 8    glucagon (GVOKE HYPOPEN 2-PACK) 1 mg/0.2 mL AtIn, Inject 1 Package into the skin as needed., Disp: 2 Syringe, Rfl: 0    insulin glargine,hum.rec.anlog (BASAGLAR KWIKPEN U-100 INSULIN SUBQ), Inject 22 Units into the skin every evening., Disp: , Rfl:     irbesartan (AVAPRO) 300 MG tablet, TAKE 1 TABLET(300 MG) BY MOUTH EVERY EVENING, Disp: 90 tablet, Rfl: 2    isosorbide mononitrate (IMDUR) 30 MG 24 hr tablet, Take 2 tablets (60 mg total) by mouth once daily., Disp: 180 tablet, Rfl: 3    lancets Misc, 1 lancet by Misc.(Non-Drug; Combo Route) route once daily., Disp: 100 each, Rfl: 3    multivitamin (THERAGRAN) per tablet, Take 1 tablet by mouth once daily., Disp: , Rfl:     nitroGLYCERIN (NITROSTAT) 0.4 MG SL tablet, Place 1 tablet (0.4 mg total) under the tongue every 5 (five) minutes  as needed for Chest pain., Disp: 25 tablet, Rfl: 6    vitamin D (VITAMIN D3) 1000 units Tab, Take 1,000 Units by mouth twice a week. Monday AND THURSDAYS ONLY, Disp: , Rfl:     Current Facility-Administered Medications:     hyaluronate sodium, stabilized (MONOVISC) Syrg, , Intra-articular, 1 time in Clinic/HOD, Peterson Sharma MD    Food allergies  Intolerances: [facility]    Labs:  Reviewed and noted  Hemoglobin A1C   Date Value Ref Range Status   09/29/2022 6.2 (H) 4.0 - 5.6 % Final     Comment:     ADA Screening Guidelines:  5.7-6.4%  Consistent with prediabetes  >or=6.5%  Consistent with diabetes    High levels of fetal hemoglobin interfere with the HbA1C  assay. Heterozygous hemoglobin variants (HbS, HgC, etc)do  not significantly interfere with this assay.   However, presence of multiple variants may affect accuracy.     06/23/2022 6.4 (H) 4.0 - 5.6 % Final     Comment:     ADA Screening Guidelines:  5.7-6.4%  Consistent with prediabetes  >or=6.5%  Consistent with diabetes    High levels of fetal hemoglobin interfere with the HbA1C  assay. Heterozygous hemoglobin variants (HbS, HgC, etc)do  not significantly interfere with this assay.   However, presence of multiple variants may affect accuracy.     05/04/2022 6.0 (H) 4.0 - 5.6 % Final     Comment:     ADA Screening Guidelines:  5.7-6.4%  Consistent with prediabetes  >or=6.5%  Consistent with diabetes    High levels of fetal hemoglobin interfere with the HbA1C  assay. Heterozygous hemoglobin variants (HbS, HgC, etc)do  not significantly interfere with this assay.   However, presence of multiple variants may affect accuracy.       Cholesterol   Date Value Ref Range Status   09/29/2022 88 (L) 120 - 199 mg/dL Final     Comment:     The National Cholesterol Education Program (NCEP) has set the  following guidelines (reference ranges) for Cholesterol:  Optimal.....................<200 mg/dL  Borderline High.............200-239 mg/dL  High........................> or =  240 mg/dL     06/27/2022 84 (L) 120 - 199 mg/dL Final     Comment:     The National Cholesterol Education Program (NCEP) has set the  following guidelines (reference ranges) for Cholesterol:  Optimal.....................<200 mg/dL  Borderline High.............200-239 mg/dL  High........................> or = 240 mg/dL     09/08/2021 91 (L) 120 - 199 mg/dL Final     Comment:     The National Cholesterol Education Program (NCEP) has set the  following guidelines (reference ranges) for Cholesterol:  Optimal.....................<200 mg/dL  Borderline High.............200-239 mg/dL  High........................> or = 240 mg/dL       Triglycerides   Date Value Ref Range Status   09/29/2022 83 30 - 150 mg/dL Final     Comment:     The National Cholesterol Education Program (NCEP) has set the  following guidelines (reference values) for triglycerides:  Normal......................<150 mg/dL  Borderline High.............150-199 mg/dL  High........................200-499 mg/dL     06/27/2022 64 30 - 150 mg/dL Final     Comment:     The National Cholesterol Education Program (NCEP) has set the  following guidelines (reference values) for triglycerides:  Normal......................<150 mg/dL  Borderline High.............150-199 mg/dL  High........................200-499 mg/dL     09/08/2021 86 30 - 150 mg/dL Final     Comment:     The National Cholesterol Education Program (NCEP) has set the  following guidelines (reference values) for triglycerides:  Normal......................<150 mg/dL  Borderline High.............150-199 mg/dL  High........................200-499 mg/dL       HDL   Date Value Ref Range Status   09/29/2022 32 (L) 40 - 75 mg/dL Final     Comment:     The National Cholesterol Education Program (NCEP) has set the  following guidelines (reference values) for HDL Cholesterol:  Low...............<40 mg/dL  Optimal...........>60 mg/dL     06/27/2022 33 (L) 40 - 75 mg/dL Final     Comment:     The National  Cholesterol Education Program (NCEP) has set the  following guidelines (reference values) for HDL Cholesterol:  Low...............<40 mg/dL  Optimal...........>60 mg/dL     09/08/2021 31 (L) 40 - 75 mg/dL Final     Comment:     The National Cholesterol Education Program (NCEP) has set the  following guidelines (reference values) for HDL Cholesterol:  Low...............<40 mg/dL  Optimal...........>60 mg/dL       LDL Cholesterol   Date Value Ref Range Status   09/29/2022 39.4 (L) 63.0 - 159.0 mg/dL Final     Comment:     The National Cholesterol Education Program (NCEP) has set the  following guidelines (reference values) for LDL Cholesterol:  Optimal.......................<130 mg/dL  Borderline High...............130-159 mg/dL  High..........................160-189 mg/dL  Very High.....................>190 mg/dL     06/27/2022 38.2 (L) 63.0 - 159.0 mg/dL Final     Comment:     The National Cholesterol Education Program (NCEP) has set the  following guidelines (reference values) for LDL Cholesterol:  Optimal.......................<130 mg/dL  Borderline High...............130-159 mg/dL  High..........................160-189 mg/dL  Very High.....................>190 mg/dL     09/08/2021 42.8 (L) 63.0 - 159.0 mg/dL Final     Comment:     The National Cholesterol Education Program (NCEP) has set the  following guidelines (reference values) for LDL Cholesterol:  Optimal.......................<130 mg/dL  Borderline High...............130-159 mg/dL  High..........................160-189 mg/dL  Very High.....................>190 mg/dL         Typical day recall: Reviewed and noted during consult     Beverages: water: 8 glasses per day. She may have a sip or 2 of soft drink with dinner a few days a week    Dining out: Regular, 4-6 times per week    Alcohol: nonsmoker; has amaretto with milk 2-4 x a month    Lifestyle Influences  Support System: her , who was present during our consult    Meal preparation/shopping:  spouse    Current Activity Level: 1 x a month    Patient motivation, anticipated barriers, expected compliance: Patient is motivated and has verbalized understanding and intent to comply     DIAGNOSIS   Problem: Excessive Energy Intake and Inadequate Energy Intake  Etiology: RT not being able to exercise as much as she'd like  Signs/Symptoms: AEB 24 hour recall     INTERVENTION  Nutrition prescription: estimated energy requirements:   Calories: 1600  Protein: 110-140 grams  Carbohydrates: 120-140 grams  Fats: choose plant-based heart healthy fats  Total fluid: 115 oz + sweat loss  Limit added sugar to 20 grams or less per day (Note: 1 teaspoon of sugar = ~5 gram)      Recommendations & Goals:  Patient goals and recommendations are tailored to the specific patient's needs, readiness to change, lifestyle, culture, skills, resources, & abilities. Strategies to help achieve these nutrition-related goals were discussed which can include but are not limited to SMART goal setting & mindful eating.     Aim for a minimum of 7 hours sleep   Exercise 60 minutes most days  Eat breakfast within 1-2 hours of waking up  Try not to skip any meals or snacks, not going more than 3-4 hours without eating.   At each meal and snack, try to include a source of fiber + lean protein + healthy fat.       Written Materials Provided  These resources are intended to assist the patient in making it easier to choose recommended options when eating out & to identify better-for-you brands at the grocery store:    Meal Planning Guide with recommendations discussed along with portion sizes and a meal plan   Fueling Well On The Go Guide  Eat Fit Grocery Product list   RD contact information- for patient to contact regarding any questions, needs, and/or concerns that may arise     MONITORING & EVALUATION    Communicated with healthcare provider    Documented plan for referral to appropriate agency/healthcare provider as needed    Comprehension:  fair    Motivation to change: moderate    Follow-up: 4-6 weeks    Counseling time: 2 Hours

## 2022-11-29 NOTE — PATIENT INSTRUCTIONS
Name: Kaylee Romero  Date: 11.29.2022    Daily Energy Requirements:  Calories: 1600  Protein: 110-140 grams  Carbohydrates: 120-140 grams  Fats: choose plant-based heart healthy fats  Total fluid: 115 oz + sweat loss  Limit added sugar to 20 grams or less per day (Note: 1 teaspoon of sugar = ~5 gram)    Goals:  Eat or drink something with protein every 3-4 hours  Know at most how many carbohydrate servings recommended for each meal and snack, with an emphasis on 100% whole grains  Increase movement/exercise gradually. Even a 10-minute walk per day adds up    Breakfast: 2 servings of carbs = 30-40 grams + at least 15 grams lean protein/heart healthy fat  1 cup high protein/low sugar cereal (see below for brands) + Fairlife milk  1-2 whole eggs + 1 slices whole grain bread -or- 1 cup chopped fruit  2 oz sliced low sodium turkey + 1 slice whole grain toast + 2 of your small slices of cheese  ½ cup cooked grits + 3 oz sauteed shrimp (sauteed in a little EVOO) + ½ cup chopped fruit  Ready to drink protein shake: Fairlife 30 gram protein shake + piece of fruit on the side    Snack: A mid-morning snack may be needed if going >3-4 hours between meals      Lunch: 4-5 oz lean protein + unlimited non-starchy veggies + 2 servings of carbs = 30-40 grams  Examples of 2 servings of carbohydrates = ~30-40 grams:  1 cup cooked pasta (see notes below for brand examples); 2/3 cup cooked brown rice; 1 medium potato or sweet potato (5-6 oz in weight); 1 medium size piece of fruit + 2 slices Angel's Killer thin sliced bread; 1 serving of whole grain chips (see notes) + 2 slices thin sliced whole grain bread; 2 slices 100-calorie bread; 1 serving of Beanito chips + ½ cup cooked whole grain starch; 1 cup cooked beans; ½ cup cooked beans + 1/3 cup cooked brown rice, etc  The rest of your plate will consist of 4-5 oz lean protein + unlimited non-starchy veggies  Meal Examples:  Soup: make sure to have at least 4 oz of protein for  your portion with the rest of your veggies and broth for the soup  Salad: Unlimited non-starchy veggies + 5 oz lean protein + 2-3 salad add-ins (see notes below) + 1 cup fruit  Beatty: 2 slices Angel's Killer 21 whole grains and seeds regular sliced bread + 5 oz freshly sliced turkey + 1 tablespoon regular edgar + 1 slice cheese + non-starchy veggies of your choice  Note: If you want a piece of fruit or 1 serving of whole grain chips with your sandwich, then do 2 slices Isidros Killer thin-sliced bread  Spaghetti & meat sauce: 1 cup cooked lentil pasta + 5 oz 93/7 lean ground beef + side of non-starchy veggies  Red beans & rice: ½ cup cooked beans + 1/3 cup cooked brown rice + additional 3 oz lean protein (ex: chicken sausage, ham, chicken, etc) + side of non-starchy veggies  If you don't want rice, then you can do 1 cup cooked beans over riced cauliflower + additional 3 oz lean protein (ex: chicken sausage, ham, chicken, etc) + side of non-starchy veggies  5 oz lean protein + non-starchy veggies + 1 medium potato (5-6 oz in weight)  Restaurants: See tips below, particularly the Pick 1 out of the 4 rule and the section about Eat Fit DAVI options for heart healthy choices    Snack: ~1 serving of carbs = 15-25 grams + at least 15 grams lean protein/heart healthy fat + unlimited amounts of non-starchy vegetables  2 hard-boiled eggs + piece of fruit  1 oz cheese (2 of your small slices of cheese) + 1 serving of 100% whole grain crackers (see notes below for brand examples)  [place]ento balance break + piece of fruit  ¼ cup nuts + piece of fruit  Piece of fruit + 2 tablespoons regular nut butter  1 serving of Beanito chips + ¼ cup shredded cheese melted on top (aka better-for-you addy's)  Flapjacked protein mighty muffin (typically found on baking aisle of grocery stores)  Protein bar  Granola bar (see below for brand examples)  Ready to drink protein shake + piece of fruit      Dinner: Lower carb  Note: If you would  like a carbohydrate for dinner, then have 1 serving of carbs = 15-20 grams = ~½ cup cooked whole grain starch   Focus on 5 oz lean protein + 1 cup or more non-starchy veggies + small amount of heart healthy fat  Carb swaps:  Hearts of palm-based 'linguini'  Brand example: Palmini   Lasagna made with hearts of palm lasagna sheets (Palmini = brand example)  'Riced' Cauliflower  'Riced' Broccoli  Cauliflower mash to mimic mashed potatoes  Spaghetti squash  Zoodles  Spiralized Beets   Low Carb Breads:  'Base Culture' 7 Nut + Seed and Original Paleo Bread  Carbonaut: Seeded low carb keto bread (Whole Foods)  Unbuns: Low carb hamburger bun (Whole Foods)    Think outside the box for low carb dishes:  Kabobs, stir fernandez with riced cauliflower or riced broccoli, stuffed bell peppers with no rice, lettuce wraps, eggplant lasagna, shrimp etouffee made with riced cauliflower, request a sushi roll that contains raw fish to be made without rice, etc      Optional post-dinner sweet: Anything UP to 200 calories  'Fun size'  2 x 2 dessert  Ice Cream: See notes for better for you brands of ice cream bars                    Building A Better Smoothie  Choose your protein. Pick 1 from the following:  Plant-based protein powder  Orgain  Sunwarrior  Zepeda  Garden of Life RAW  100% whey protein powder  2 scoops Collagen Peptides (Vital Proteins is a brand favorite)  Make it sweet:  Choose 1 cup frozen or fresh fruit of your choice to chasidy up your smoothie  Make sure you are choosing frozen fruit with no sugar added; be sure to look at the ingredient list  Add your veggies:  Instead of ice, choose frozen cauliflower florets. Cauliflower helps with the detoxification process in our bodies, and it's virtually tasteless!  Greens: spinach, kale, or other leafy greens  Beets are a great addition to smoothies, especially paired with strawberries and lemon  Cucumbers and celery are refreshing ways to enhance nutrition and hydration within  your smoothie  Add in a plant-based fat:   Nut Butter: 1 teaspoon   Natural peanut butter  Eagan butter  Cashew butter  Nuts: ~2 tablespoons   Avocado (¼)  Avocado oil  EVOO: 1 teaspoon   Add a liquid to reach your desired consistency:  Unsweetened/No sugar added plant-based milk   Eagan milk, Hemp milk, Cashew milk, Coconut milk, Rice milk, Soy milk  Fairlife Milk: 1% or 2%  Water   Healthy add-ins for an extra nutritional boost:  1 tablespoon flaxseeds or andrea seeds        Restaurant Tips      Pick 1 out of the 4 Rule: Instead of eating bread/tortilla chips, an appetizer, alcoholic drink and dessert, choose just one to have with your entrée  Focus on lean proteins: Refer to lean meat/meat substitutes page in meal planning guidebook. Select items grilled, baked, broiled, braised, poached or roasted      For your heart health, avoid crispy, crunchy, breaded, paneed or stuffed items and items that are cream based, au gratin or buttered      Order sauces, dressings, and gravies on the side. This way you can add 1-2 tablespoons yourself. This helps with portion control     Request extra non-starchy vegetables instead of a starchy side dish. If the starchy side is something you love, consider splitting it with someone else at the table     Beverages: Order water with lemon, sparkling water, or unsweetened tea. Avoid sugary soft drinks, juices and mixers    Dining Out     [facility] Eat [facility]  Designed to take the guesswork out of dining out healthfully, [facility] makes the healthy choice the easy choice  Visit [email or web address]   Order the Vite Cookbook to create restaurant quality dishes at home  Download the [facility] Eat Fit breanna for free on your smartphone  All Eat Fit restaurants & dishes by location   Nutrition facts for every Eat Fit dish  200 + Eat [employer] approved recipes  Grocery shopping guides + community wellness resources    - Ordering A Better-For-You Salad:  Look for lean protein,  unlimited non-starchy vegetables, and plant-based fats  Order dressings on the side to better control portion sizes. Add 1-2 tablespoons yourself to lightly coat  Pick 2-3 salad add-ins, each being ~2 tablespoons:  Dressing  Cheese  Nuts  Gil  Dried Fruit  Avocado/Guacamole  Eggs        Additional Nutrition Tips  -Dietary fiber comes from: Fruits, vegetables, 100% whole grains, beans and nuts  -Rule of thumb for Aisle products: (Food products located in the center aisles of grocery stores):   Juan #7 Rule  Look for products that have 7 grams or less added sugar, and at least 7 grams or more protein    -Choose 100% whole wheat/whole grain products   -Note: 'wheat' bread and 'wheat' flour are white bread/white flour    -Taking the skin off fried chicken is basically the same as eating grilled chicken    -Training/retraining the body is key, and may take some time:   Whatever you are currently doing for your meals/snacks and exercise routine, your body is used to it  When changing a habit(s), it's normal for it to be a little hard at first. Your body gets used to new habits over time with consistency    -I recommend measuring everything in cooked portions to see what each recommended portion looks like on your plate and bowls; Then eyeball after you feel comfortable with recommended portions    -Add ~¼ teaspoon baking soda per 1 cup liquid to reduce lemon/acid taste (start small, mix well and then see if you need to add more baking soda gradually)    -What potentially increases inflammation/flare-ups in our body?  White/refined carbohydrates  Sugar  Alcohol  Fried foods  -If baking, try using Swerve 0-calorie all-natural plant-based sweetener instead of sugar. It really does taste the same!  -If you're having trouble finding a specific food product, try looking on the brands website. Most companies have a 'store /find a store' on their website          Favorite Food Brands    - Whole Grain Breads:  Royer  Killer Bread - 21 Whole Grains and Seeds (green label), Good Seed (yellow label), Power Seed (red label)   Thin sliced -or- regular slice  Any 100% whole wheat  100% whole grain option    -Whole Grain Tortillas  Wraps:  Corn (contains 0-10 mg sodium)    -Whole Grain Pancakes  Waffles:  [place] Cake 100% whole grain pancake/waffle mix (my favorite)  Flapjacked - Mighty Muffin (typically found on baking aisle)    -Whole Grain Frozen Waffles:  Kashi GO Protein Waffles  [place] Cakes Gluten Free and Whole Grain Protein Waffles  Van's Power Grains Original  only)    -High Protein Pasta's:  'Banza' chickpea products:   Mac-n-cheese  Pasta's - all varieties   Rice   Red lentil  Yellow lentil pasta  Black bean pasta (aka squid ink pasta)  Edamame-based pasta    - Red Sauce (In A Jar):  Alberto & Kaylah's Heart Smart Sauce (available flavors: Roasted Garlic, Nocatee or Original)  Naldo[employer]'s Finest Gourmet Foods Pasta Sauce  Classico Original  Engine 2 Plant-Strong    -Cold Cereals:  Special K PROTEIN  Premier Protein   Kashi Go Grain Free  Dark Cocoa + Cinnamon Vanilla  Angie Crunch Keto-Friendly Cereals  Iwon organic protein crunchies  Three Wishes    -Whole Grain Crackers:  Triscuits - Regular + thin crisps  Wheat Thins  Blue Jennifer almond nut thins  Macy's Gone Cracker  Crunchmaster protein sea salt cracker    -Better-For-You Ice Cream  Ice Cream Bars:  Halo Top high protein ice cream pints - regular and keto series  KETO pint ice cream bars  Halo Top Pops  Realgood ice cream  Enlightened ice cream bar  Enlightened 'Light' ice cream  Yasso Frozen Greek yogurt bar    -Protein  Granola Bars:  [place] Valley Protein Chewy   Kashi Go Protein Bar   KIND Protein + KIND Bars (with 7 grams sugar or less)   Rx Bar   Rx Layers Bars   Kashi Grain Free Coconut Walnut  Oatmega Bars    -Ready-to-Drink Protein Drinks:  MIKA Audio 30-gram Protein Drink (This is the one I showed you in my office and tastes like chocolate milk)  [facility] C[facility]  POWER Protein Drink   Orgain Clean- grass-fed + plant based    - 'Vital Proteins' Collagen Peptides, unflavored:   Great to have as a staple in your pantry. You can add to soups, hummus, coffee, etc to make higher protein    -Supplements:  Vitamin D3: 1,000 - 2,000 i.u per day   Fish Oil: Look for at least 1200 mg EPA + DHA per 2 capsules  My favorite brand is [email or web address]'s Ultimate Omega  One-A-Day MVI  Brand example: [facility]'s Women's Multivitamin Ultra Vahe    -If you need to reschedule a nutrition appointment, please call [facility] at [phone]

## 2022-11-30 ENCOUNTER — PATIENT OUTREACH (OUTPATIENT)
Dept: ADMINISTRATIVE | Facility: HOSPITAL | Age: 77
End: 2022-11-30
Payer: MEDICARE

## 2022-11-30 NOTE — PROGRESS NOTES
Patient due for the following    Influenza Vaccine (1)      Patient has an upcoming appt with pcp      Immunizations: reviewed and updated  Care Everywhere: triggered  Care Teams: up to date  Outreach: none needed

## 2022-12-15 DIAGNOSIS — M81.0 OSTEOPOROSIS WITHOUT CURRENT PATHOLOGICAL FRACTURE, UNSPECIFIED OSTEOPOROSIS TYPE: ICD-10-CM

## 2022-12-15 RX ORDER — ALENDRONATE SODIUM 70 MG/1
70 TABLET ORAL
Qty: 12 TABLET | Refills: 0 | Status: CANCELLED | OUTPATIENT
Start: 2022-12-15

## 2022-12-15 RX ORDER — ALENDRONATE SODIUM 70 MG/1
70 TABLET ORAL
Qty: 12 TABLET | Refills: 0 | Status: SHIPPED | OUTPATIENT
Start: 2022-12-15 | End: 2023-03-06

## 2022-12-16 ENCOUNTER — PATIENT MESSAGE (OUTPATIENT)
Dept: CARDIOLOGY | Facility: CLINIC | Age: 77
End: 2022-12-16
Payer: MEDICARE

## 2022-12-21 ENCOUNTER — OFFICE VISIT (OUTPATIENT)
Dept: PODIATRY | Facility: CLINIC | Age: 77
End: 2022-12-21
Payer: MEDICARE

## 2022-12-21 VITALS
DIASTOLIC BLOOD PRESSURE: 78 MMHG | BODY MASS INDEX: 43.24 KG/M2 | HEIGHT: 62 IN | WEIGHT: 235 LBS | SYSTOLIC BLOOD PRESSURE: 135 MMHG | HEART RATE: 99 BPM

## 2022-12-21 DIAGNOSIS — L84 CORN OR CALLUS: ICD-10-CM

## 2022-12-21 DIAGNOSIS — E11.49 TYPE II DIABETES MELLITUS WITH NEUROLOGICAL MANIFESTATIONS: Primary | ICD-10-CM

## 2022-12-21 DIAGNOSIS — B35.1 DERMATOPHYTOSIS OF NAIL: ICD-10-CM

## 2022-12-21 PROCEDURE — 1101F PR PT FALLS ASSESS DOC 0-1 FALLS W/OUT INJ PAST YR: ICD-10-PCS | Mod: CPTII,S$GLB,, | Performed by: PODIATRIST

## 2022-12-21 PROCEDURE — 3075F PR MOST RECENT SYSTOLIC BLOOD PRESS GE 130-139MM HG: ICD-10-PCS | Mod: CPTII,S$GLB,, | Performed by: PODIATRIST

## 2022-12-21 PROCEDURE — 4010F ACE/ARB THERAPY RXD/TAKEN: CPT | Mod: CPTII,S$GLB,, | Performed by: PODIATRIST

## 2022-12-21 PROCEDURE — 1159F MED LIST DOCD IN RCRD: CPT | Mod: CPTII,S$GLB,, | Performed by: PODIATRIST

## 2022-12-21 PROCEDURE — 99999 PR PBB SHADOW E&M-EST. PATIENT-LVL IV: ICD-10-PCS | Mod: PBBFAC,,, | Performed by: PODIATRIST

## 2022-12-21 PROCEDURE — 99499 NO LOS: ICD-10-PCS | Mod: S$GLB,,, | Performed by: PODIATRIST

## 2022-12-21 PROCEDURE — 1101F PT FALLS ASSESS-DOCD LE1/YR: CPT | Mod: CPTII,S$GLB,, | Performed by: PODIATRIST

## 2022-12-21 PROCEDURE — 11056 PARNG/CUTG B9 HYPRKR LES 2-4: CPT | Mod: Q9,S$GLB,, | Performed by: PODIATRIST

## 2022-12-21 PROCEDURE — 3061F PR NEG MICROALBUMINURIA RESULT DOCUMENTED/REVIEW: ICD-10-PCS | Mod: CPTII,S$GLB,, | Performed by: PODIATRIST

## 2022-12-21 PROCEDURE — 1160F PR REVIEW ALL MEDS BY PRESCRIBER/CLIN PHARMACIST DOCUMENTED: ICD-10-PCS | Mod: CPTII,S$GLB,, | Performed by: PODIATRIST

## 2022-12-21 PROCEDURE — 4010F PR ACE/ARB THEARPY RXD/TAKEN: ICD-10-PCS | Mod: CPTII,S$GLB,, | Performed by: PODIATRIST

## 2022-12-21 PROCEDURE — 11721 DEBRIDE NAIL 6 OR MORE: CPT | Mod: 59,Q9,S$GLB, | Performed by: PODIATRIST

## 2022-12-21 PROCEDURE — 99499 UNLISTED E&M SERVICE: CPT | Mod: S$GLB,,, | Performed by: PODIATRIST

## 2022-12-21 PROCEDURE — 11056 ROUTINE FOOT CARE: ICD-10-PCS | Mod: Q9,S$GLB,, | Performed by: PODIATRIST

## 2022-12-21 PROCEDURE — 1159F PR MEDICATION LIST DOCUMENTED IN MEDICAL RECORD: ICD-10-PCS | Mod: CPTII,S$GLB,, | Performed by: PODIATRIST

## 2022-12-21 PROCEDURE — 1160F RVW MEDS BY RX/DR IN RCRD: CPT | Mod: CPTII,S$GLB,, | Performed by: PODIATRIST

## 2022-12-21 PROCEDURE — 11721 ROUTINE FOOT CARE: ICD-10-PCS | Mod: 59,Q9,S$GLB, | Performed by: PODIATRIST

## 2022-12-21 PROCEDURE — 99999 PR PBB SHADOW E&M-EST. PATIENT-LVL IV: CPT | Mod: PBBFAC,,, | Performed by: PODIATRIST

## 2022-12-21 PROCEDURE — 3061F NEG MICROALBUMINURIA REV: CPT | Mod: CPTII,S$GLB,, | Performed by: PODIATRIST

## 2022-12-21 PROCEDURE — 3078F PR MOST RECENT DIASTOLIC BLOOD PRESSURE < 80 MM HG: ICD-10-PCS | Mod: CPTII,S$GLB,, | Performed by: PODIATRIST

## 2022-12-21 PROCEDURE — 3288F PR FALLS RISK ASSESSMENT DOCUMENTED: ICD-10-PCS | Mod: CPTII,S$GLB,, | Performed by: PODIATRIST

## 2022-12-21 PROCEDURE — 3288F FALL RISK ASSESSMENT DOCD: CPT | Mod: CPTII,S$GLB,, | Performed by: PODIATRIST

## 2022-12-21 PROCEDURE — 3066F PR DOCUMENTATION OF TREATMENT FOR NEPHROPATHY: ICD-10-PCS | Mod: CPTII,S$GLB,, | Performed by: PODIATRIST

## 2022-12-21 PROCEDURE — 3044F PR MOST RECENT HEMOGLOBIN A1C LEVEL <7.0%: ICD-10-PCS | Mod: CPTII,S$GLB,, | Performed by: PODIATRIST

## 2022-12-21 PROCEDURE — 3075F SYST BP GE 130 - 139MM HG: CPT | Mod: CPTII,S$GLB,, | Performed by: PODIATRIST

## 2022-12-21 PROCEDURE — 3078F DIAST BP <80 MM HG: CPT | Mod: CPTII,S$GLB,, | Performed by: PODIATRIST

## 2022-12-21 PROCEDURE — 3066F NEPHROPATHY DOC TX: CPT | Mod: CPTII,S$GLB,, | Performed by: PODIATRIST

## 2022-12-21 PROCEDURE — 3044F HG A1C LEVEL LT 7.0%: CPT | Mod: CPTII,S$GLB,, | Performed by: PODIATRIST

## 2022-12-21 PROCEDURE — 1126F PR PAIN SEVERITY QUANTIFIED, NO PAIN PRESENT: ICD-10-PCS | Mod: CPTII,S$GLB,, | Performed by: PODIATRIST

## 2022-12-21 PROCEDURE — 1126F AMNT PAIN NOTED NONE PRSNT: CPT | Mod: CPTII,S$GLB,, | Performed by: PODIATRIST

## 2022-12-21 NOTE — PROGRESS NOTES
Chief Concern: Diabetic Foot Exam (Liz Roberts MD 12-)      HPI:  Kaylee Romero is a 77 y.o. female presents for foot exam and care.       PCP:  Liz Roberts MD, her last visit with her PCP was on 6/27/2022; upcoming appointment on 12/30/2022          Patient Active Problem List   Diagnosis    Osteoarthritis of knee    Essential hypertension    Dyslipidemia, goal LDL below 70    Morbid obesity with body mass index (BMI) of 40.0 or higher    KAMRAN on CPAP    Physical deconditioning    Osteoporosis without current pathological fracture    History of TIA (transient ischemic attack)    Age-related osteoporosis without current pathological fracture    Vitamin D deficiency, unspecified    Type 2 diabetes mellitus with stage 3 chronic kidney disease, without long-term current use of insulin    History of MI (myocardial infarction)    Coronary 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 pectoris    S/P CABG (coronary artery bypass graft)    Shoulder pain    Chest pain    Physical debility    GRANADO (dyspnea on exertion)    S/P drug eluting coronary stent placement    Decreased range of motion of left knee    Chronic pain of right knee    Antalgic gait    Abnormal stress test    Aortic atherosclerosis    Chronic heart failure with preserved ejection fraction                 Current Outpatient Medications on File Prior to Visit   Medication Sig Dispense Refill    acetaminophen (TYLENOL) 500 MG tablet Take 500 mg by mouth 2 (two) times daily as needed for Pain.      alendronate (FOSAMAX) 70 MG tablet Take 1 tablet (70 mg total) by mouth every 7 days. 12 tablet 0    amLODIPine (NORVASC) 10 MG tablet TAKE 1 TABLET(10 MG) BY MOUTH EVERY DAY 90 tablet 2    ammonium lactate (LAC-HYDRIN) 12 % lotion Apply topically as needed for Dry Skin. On the feet and toenails. 225 g 6    atorvastatin (LIPITOR) 80 MG tablet TAKE 1 TABLET(80 MG) BY MOUTH EVERY DAY 90 tablet 3    BD BOO 2ND GEN PEN  "NEEDLE 32 gauge x 5/32" Ndle USE EVERY  each 8    blood sugar diagnostic Strp 1 strip by Misc.(Non-Drug; Combo Route) route once daily. 100 strip 3    carvediloL (COREG) 25 MG tablet TAKE 1 TABLET(25 MG) BY MOUTH TWICE DAILY WITH MEALS 180 tablet 1    clopidogreL (PLAVIX) 75 mg tablet TAKE 1 TABLET(75 MG) BY MOUTH EVERY DAY 90 tablet 3    COD LIVER OIL ORAL TAKE 2 CAPSULES BY MOUTH EVERY MORNING AND 1 CAPSULE EVERY EVENING      diclofenac sodium (VOLTAREN) 1 % Gel Apply 2 g topically 4 (four) times daily as needed. To painful area on the feet. (Patient taking differently: Apply 2 g topically 4 (four) times daily as needed. To painful area on the feet and knee) 500 g 5    diphenoxylate-atropine 2.5-0.025 mg (LOMOTIL) 2.5-0.025 mg per tablet TAKE 1 TABLET BY MOUTH FOUR TIMES DAILY AS NEEDED FOR DIARRHEA 30 tablet 30    dulaglutide (TRULICITY) 4.5 mg/0.5 mL pen injector Inject 4.5 mg into the skin every 7 days. (Patient taking differently: Inject 4.5 mg into the skin every Sunday.) 2 mL 3    FEROSUL 325 mg (65 mg iron) Tab tablet TAKE 1 TABLET BY MOUTH DAILY 90 tablet 1    furosemide (LASIX) 20 MG tablet Take 20 mg by mouth every Sat, Sun, Mon, Wed, & Fri 30 tablet 3    glucagon (GVOKE HYPOPEN 2-PACK) 1 mg/0.2 mL AtIn Inject 1 Package into the skin as needed. 2 Syringe 0    insulin glargine,hum.rec.anlog (BASAGLAR KWIKPEN U-100 INSULIN SUBQ) Inject 22 Units into the skin every evening.      irbesartan (AVAPRO) 300 MG tablet TAKE 1 TABLET(300 MG) BY MOUTH EVERY EVENING 90 tablet 2    isosorbide mononitrate (IMDUR) 30 MG 24 hr tablet Take 2 tablets (60 mg total) by mouth once daily. 180 tablet 3    lancets Misc 1 lancet by Misc.(Non-Drug; Combo Route) route once daily. 100 each 3    multivitamin (THERAGRAN) per tablet Take 1 tablet by mouth once daily.      nitroGLYCERIN (NITROSTAT) 0.4 MG SL tablet Place 1 tablet (0.4 mg total) under the tongue every 5 (five) minutes as needed for Chest pain. 25 tablet 6    " "vitamin D (VITAMIN D3) 1000 units Tab Take 1,000 Units by mouth twice a week. Monday AND THURSDAYS ONLY      aspirin 81 MG Chew Take 1 tablet (81 mg total) by mouth once daily.  0    donepeziL (ARICEPT) 5 MG tablet TAKE 1 TABLET(5 MG) BY MOUTH EVERY EVENING FOR MEMORY 90 tablet 2    glipiZIDE (GLUCOTROL) 5 MG tablet Take 2 tablets (10 mg total) by mouth 2 (two) times daily with meals. TAKE 2 TABLETS BY MOUTH DAILY WITH DINNER OR EVENING MEAL (Patient taking differently: Take 10 mg by mouth 2 (two) times daily with meals.) 120 tablet 8     Current Facility-Administered Medications on File Prior to Visit   Medication Dose Route Frequency Provider Last Rate Last Admin    hyaluronate sodium, stabilized (MONOVISC) Syrg   Intra-articular 1 time in Clinic/HOD Peterson Sharma MD             Review of patient's allergies indicates:   Allergen Reactions    Keflex [cephalexin] Other (See Comments)     Turns orange    Bactrim [sulfamethoxazole-trimethoprim]      Generic version  Sulfa makes her sick               Objective:        Vitals:    12/21/22 1128   BP: 135/78   Pulse: 99   Weight: 106.6 kg (235 lb)   Height: 5' 2" (1.575 m)         Physical Exam  Constitutional:       Appearance: She is well-developed.   Cardiovascular:      Comments: DP and PT pulses 2/4 loreta.   Edema noted loreta.   Capillary refill time < 3 seconds to digits 1-5, loreta.   Absent hair growth      Musculoskeletal:         General: Deformity present. No tenderness. Normal range of motion.      Comments: HAV noted to loreta 1st MPJ. 5/5 strength to all LE muscle groups. No POP noted     Skin:     Capillary Refill: Capillary refill takes 2 to 3 seconds.      Findings: No erythema.      Comments: Toenails x 10 are elongated by 1-3mm, thickened by 1-3mm and mycotic with subungual debris.  Flaky skin on the soles.  No vesicles or redness.      Neurological:      Mental Status: She is alert and oriented to person, place, and time.      Comments: +paresthesias " (Abnormal spontaneous sensations in feet)                 Assessment:       Problem List Items Addressed This Visit    None  Visit Diagnoses       Type II diabetes mellitus with neurological manifestations    -  Primary    Dermatophytosis of nail        Corn or callus                Plan:         I counseled the patient on the patient's conditions, their implications and medical management.  Shoe inspection.     Maintain proper foot hygiene.   Continue wearing proper shoe gear, daily foot inspections, never walking without protective shoe gear, never putting sharp instruments to feet.    Routine Foot Care    Date/Time: 12/21/2022 11:15 AM  Performed by: Donna Gillis DPM  Authorized by: Donna Gillis DPM     Consent Done?:  Yes (Verbal)  Hyperkeratotic Skin Lesions?: Yes    Number of trimmed lesions:  2  Location(s):  Left 1st Toe and Right 1st Toe    Nail Care Type:  Debride  Location(s): All  (Left 1st Toe, Left 3rd Toe, Left 2nd Toe, Left 4th Toe, Left 5th Toe, Right 1st Toe, Right 2nd Toe, Right 3rd Toe, Right 4th Toe and Right 5th Toe)  Patient tolerance:  Patient tolerated the procedure well with no immediate complications     With patient's permission, the toenails mentioned above were reduced and debrided using a nail nipper, removing offending nail and debris.   Utilizing a #15 scalpel, I trimmed the corns and calluses at the above mentioned location.      The patient will continue to monitor the areas daily, inspect the feet, wear protective shoe gear when ambulatory, and moisturizer to maintain skin integrity.                   Donna Gillis DPM

## 2022-12-27 ENCOUNTER — TELEPHONE (OUTPATIENT)
Dept: ADMINISTRATIVE | Facility: CLINIC | Age: 77
End: 2022-12-27
Payer: MEDICARE

## 2022-12-27 ENCOUNTER — PATIENT MESSAGE (OUTPATIENT)
Dept: ADMINISTRATIVE | Facility: CLINIC | Age: 77
End: 2022-12-27
Payer: MEDICARE

## 2022-12-27 NOTE — TELEPHONE ENCOUNTER
Called pt; informed pt I was calling to confirm her virtual EAWV on 12/28/22 at 3:00pm and to see if she needed any help; pt stated she will need help completing the e-pre check and setting up for the virtual appt but can not do it today; pt stated to call her back tomorrow an hour before the scheduled appt time; sent message through portal

## 2022-12-28 ENCOUNTER — OFFICE VISIT (OUTPATIENT)
Dept: INTERNAL MEDICINE | Facility: CLINIC | Age: 77
End: 2022-12-28
Payer: MEDICARE

## 2022-12-28 DIAGNOSIS — I70.0 AORTIC ATHEROSCLEROSIS: ICD-10-CM

## 2022-12-28 DIAGNOSIS — N18.31 TYPE 2 DIABETES MELLITUS WITH STAGE 3A CHRONIC KIDNEY DISEASE, WITHOUT LONG-TERM CURRENT USE OF INSULIN: Chronic | ICD-10-CM

## 2022-12-28 DIAGNOSIS — N18.31 STAGE 3A CHRONIC KIDNEY DISEASE: ICD-10-CM

## 2022-12-28 DIAGNOSIS — R26.89 ANTALGIC GAIT: ICD-10-CM

## 2022-12-28 DIAGNOSIS — Z86.73 HISTORY OF TIA (TRANSIENT ISCHEMIC ATTACK): ICD-10-CM

## 2022-12-28 DIAGNOSIS — E11.22 TYPE 2 DIABETES MELLITUS WITH STAGE 3A CHRONIC KIDNEY DISEASE, WITHOUT LONG-TERM CURRENT USE OF INSULIN: Chronic | ICD-10-CM

## 2022-12-28 DIAGNOSIS — E78.5 DYSLIPIDEMIA, GOAL LDL BELOW 70: ICD-10-CM

## 2022-12-28 DIAGNOSIS — M81.0 AGE-RELATED OSTEOPOROSIS WITHOUT CURRENT PATHOLOGICAL FRACTURE: ICD-10-CM

## 2022-12-28 DIAGNOSIS — E55.9 VITAMIN D DEFICIENCY, UNSPECIFIED: ICD-10-CM

## 2022-12-28 DIAGNOSIS — I20.89 OTHER FORMS OF ANGINA PECTORIS: ICD-10-CM

## 2022-12-28 DIAGNOSIS — G89.29 CHRONIC PAIN OF RIGHT KNEE: ICD-10-CM

## 2022-12-28 DIAGNOSIS — Z00.00 ENCOUNTER FOR PREVENTIVE HEALTH EXAMINATION: Primary | ICD-10-CM

## 2022-12-28 DIAGNOSIS — I10 ESSENTIAL HYPERTENSION: Chronic | ICD-10-CM

## 2022-12-28 DIAGNOSIS — G47.33 OSA ON CPAP: ICD-10-CM

## 2022-12-28 DIAGNOSIS — I25.2 HISTORY OF MI (MYOCARDIAL INFARCTION): ICD-10-CM

## 2022-12-28 DIAGNOSIS — Z95.1 S/P CABG (CORONARY ARTERY BYPASS GRAFT): ICD-10-CM

## 2022-12-28 DIAGNOSIS — M17.12 PRIMARY OSTEOARTHRITIS OF LEFT KNEE: ICD-10-CM

## 2022-12-28 DIAGNOSIS — R06.09 DOE (DYSPNEA ON EXERTION): ICD-10-CM

## 2022-12-28 DIAGNOSIS — E66.01 MORBID OBESITY WITH BODY MASS INDEX (BMI) OF 40.0 OR HIGHER: ICD-10-CM

## 2022-12-28 DIAGNOSIS — R53.81 PHYSICAL DECONDITIONING: ICD-10-CM

## 2022-12-28 DIAGNOSIS — I50.32 CHRONIC HEART FAILURE WITH PRESERVED EJECTION FRACTION: ICD-10-CM

## 2022-12-28 DIAGNOSIS — M81.0 OSTEOPOROSIS WITHOUT CURRENT PATHOLOGICAL FRACTURE, UNSPECIFIED OSTEOPOROSIS TYPE: Chronic | ICD-10-CM

## 2022-12-28 DIAGNOSIS — M25.561 CHRONIC PAIN OF RIGHT KNEE: ICD-10-CM

## 2022-12-28 DIAGNOSIS — R94.39 ABNORMAL STRESS TEST: ICD-10-CM

## 2022-12-28 DIAGNOSIS — R53.81 PHYSICAL DEBILITY: ICD-10-CM

## 2022-12-28 DIAGNOSIS — M25.662 DECREASED RANGE OF MOTION OF LEFT KNEE: ICD-10-CM

## 2022-12-28 DIAGNOSIS — I25.118 CORONARY ARTERY DISEASE OF NATIVE ARTERY OF NATIVE HEART WITH STABLE ANGINA PECTORIS: ICD-10-CM

## 2022-12-28 DIAGNOSIS — Z95.5 S/P DRUG ELUTING CORONARY STENT PLACEMENT: ICD-10-CM

## 2022-12-28 DIAGNOSIS — M25.519 SHOULDER PAIN, UNSPECIFIED CHRONICITY, UNSPECIFIED LATERALITY: ICD-10-CM

## 2022-12-28 PROCEDURE — 1159F MED LIST DOCD IN RCRD: CPT | Mod: CPTII,95,, | Performed by: NURSE PRACTITIONER

## 2022-12-28 PROCEDURE — 4010F PR ACE/ARB THEARPY RXD/TAKEN: ICD-10-PCS | Mod: CPTII,95,, | Performed by: NURSE PRACTITIONER

## 2022-12-28 PROCEDURE — 3044F HG A1C LEVEL LT 7.0%: CPT | Mod: CPTII,95,, | Performed by: NURSE PRACTITIONER

## 2022-12-28 PROCEDURE — G0439 PR MEDICARE ANNUAL WELLNESS SUBSEQUENT VISIT: ICD-10-PCS | Mod: 95,,, | Performed by: NURSE PRACTITIONER

## 2022-12-28 PROCEDURE — 1160F PR REVIEW ALL MEDS BY PRESCRIBER/CLIN PHARMACIST DOCUMENTED: ICD-10-PCS | Mod: CPTII,95,, | Performed by: NURSE PRACTITIONER

## 2022-12-28 PROCEDURE — 1170F FXNL STATUS ASSESSED: CPT | Mod: CPTII,95,, | Performed by: NURSE PRACTITIONER

## 2022-12-28 PROCEDURE — 3288F PR FALLS RISK ASSESSMENT DOCUMENTED: ICD-10-PCS | Mod: CPTII,95,, | Performed by: NURSE PRACTITIONER

## 2022-12-28 PROCEDURE — 3061F PR NEG MICROALBUMINURIA RESULT DOCUMENTED/REVIEW: ICD-10-PCS | Mod: CPTII,95,, | Performed by: NURSE PRACTITIONER

## 2022-12-28 PROCEDURE — G0439 PPPS, SUBSEQ VISIT: HCPCS | Mod: 95,,, | Performed by: NURSE PRACTITIONER

## 2022-12-28 PROCEDURE — 1159F PR MEDICATION LIST DOCUMENTED IN MEDICAL RECORD: ICD-10-PCS | Mod: CPTII,95,, | Performed by: NURSE PRACTITIONER

## 2022-12-28 PROCEDURE — 3066F NEPHROPATHY DOC TX: CPT | Mod: CPTII,95,, | Performed by: NURSE PRACTITIONER

## 2022-12-28 PROCEDURE — 1170F PR FUNCTIONAL STATUS ASSESSED: ICD-10-PCS | Mod: CPTII,95,, | Performed by: NURSE PRACTITIONER

## 2022-12-28 PROCEDURE — 3288F FALL RISK ASSESSMENT DOCD: CPT | Mod: CPTII,95,, | Performed by: NURSE PRACTITIONER

## 2022-12-28 PROCEDURE — 3044F PR MOST RECENT HEMOGLOBIN A1C LEVEL <7.0%: ICD-10-PCS | Mod: CPTII,95,, | Performed by: NURSE PRACTITIONER

## 2022-12-28 PROCEDURE — 1101F PT FALLS ASSESS-DOCD LE1/YR: CPT | Mod: CPTII,95,, | Performed by: NURSE PRACTITIONER

## 2022-12-28 PROCEDURE — 1160F RVW MEDS BY RX/DR IN RCRD: CPT | Mod: CPTII,95,, | Performed by: NURSE PRACTITIONER

## 2022-12-28 PROCEDURE — 1101F PR PT FALLS ASSESS DOC 0-1 FALLS W/OUT INJ PAST YR: ICD-10-PCS | Mod: CPTII,95,, | Performed by: NURSE PRACTITIONER

## 2022-12-28 PROCEDURE — 4010F ACE/ARB THERAPY RXD/TAKEN: CPT | Mod: CPTII,95,, | Performed by: NURSE PRACTITIONER

## 2022-12-28 PROCEDURE — 3061F NEG MICROALBUMINURIA REV: CPT | Mod: CPTII,95,, | Performed by: NURSE PRACTITIONER

## 2022-12-28 PROCEDURE — 3066F PR DOCUMENTATION OF TREATMENT FOR NEPHROPATHY: ICD-10-PCS | Mod: CPTII,95,, | Performed by: NURSE PRACTITIONER

## 2022-12-28 NOTE — PATIENT INSTRUCTIONS
Counseling and Referral of Other Preventative  (Italic type indicates deductible and co-insurance are waived)    Patient Name: Kaylee Romero  Today's Date: 12/28/2022    Health Maintenance       Date Due Completion Date    Diabetes Urine Screening 02/21/2023 2/21/2022    Colorectal Cancer Screening 02/22/2023 2/22/2022    Hemoglobin A1c 03/29/2023 9/29/2022    Eye Exam 04/08/2023 4/8/2022    Override on 4/8/2022: Done    Override on 7/24/2018: Done    DEXA Scan 05/24/2023 5/24/2021    Mammogram 05/31/2023 5/31/2022    Foot Exam 08/22/2023 8/22/2022 (Done)    Override on 8/22/2022: Done    Override on 10/5/2020: Done    Override on 10/21/2019: Done    Lipid Panel 09/29/2023 9/29/2022    High Dose Statin 12/21/2023 12/21/2022    TETANUS VACCINE 07/18/2025 7/18/2015        No orders of the defined types were placed in this encounter.    The following information is provided to all patients.  This information is to help you find resources for any of the problems found today that may be affecting your health:                Living healthy guide: www.[facility].[place].gov      Understanding Diabetes: www.diabetes.org      Eating healthy: www.cdc.gov/healthyweight      CDC home safety checklist: www.cdc.gov/steadi/patient.html      Agency on Aging: www.goea.[place].[facility]      Alcoholics anonymous (AA): www.aa.org      Physical Activity: www.jay.nih.gov/ad7yphk      Tobacco use: www.quitwithusla.org

## 2022-12-28 NOTE — PROGRESS NOTES
The patient location is: [place]  The chief complaint leading to consultation is: awv    Visit type: audiovisual    Face to Face time with patient: Yes  30 minutes of total time spent on the encounter, which includes face to face time and non-face to face time preparing to see the patient (eg, review of tests), Obtaining and/or reviewing separately obtained history, Documenting clinical information in the electronic or other health record, Independently interpreting results (not separately reported) and communicating results to the patient/family/caregiver, or Care coordination (not separately reported).         Each patient to whom he or she provides medical services by telemedicine is:  (1) informed of the relationship between the physician and patient and the respective role of any other health care provider with respect to management of the patient; and (2) notified that he or she may decline to receive medical services by telemedicine and may withdraw from such care at any time.          Kaylee Romero presented for a  Medicare AWV and comprehensive Health Risk Assessment today. The following components were reviewed and updated:    Medical history  Family History  Social history  Allergies and Current Medications  Health Risk Assessment  Health Maintenance  Care Team         ** See Completed Assessments for Annual Wellness Visit within the encounter summary.**         The following assessments were completed:  Living Situation  CAGE  Depression Screening  Fall Risk Assessment (MACH 10)  Hearing Assessment(HHI)  Cognitive Function Screening  Nutrition Screening  ADL Screening  PAQ Screening    Constitutional: She is oriented to person, place, and time and well-developed, well-nourished, and in no distress. No distress.   HENT:   Head: Normocephalic and atraumatic.   Eyes: No scleral icterus.   Pulmonary/Chest: Effort normal. No respiratory distress.   Neurological: She is alert and oriented to person, place, and  time.   Skin: She is not diaphoretic.   Psychiatric: Mood and affect normal.         Diagnoses and health risks identified today and associated recommendations/orders:    1. Encounter for preventive health examination  Annual Health Risk Assessment (HRA) visit today.  Counseling and referral of health maintenance and preventative health measures performed.  Patient given annual wellness paperwork to take home.  Encouraged to return in 1 year for subsequent HRA visit.     2. Essential hypertension  Chronic. Stable. Controlled. Encouraged to increase exercise as tolerated (moderate-intensity aerobic activity and muscle-strengthening activities) improve diet to heart healthy, low sodium diet.  Continue current treatment plan as previously prescribed by PCP.    3. History of TIA (transient ischemic attack)  Chronic. Stable. Continue current treatment plan as previously prescribed by PCP.    4. Dyslipidemia, goal LDL below 70  Chronic. Stable. Continue current treatment plan as previously prescribed by PCP.    5. History of MI (myocardial infarction)  Chronic. Stable. Continue current treatment plan as previously prescribed by PCP.    6. Coronary 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 pectoris  Chronic. Stable. Continue current treatment plan as previously prescribed by PCP.    7. S/P CABG (coronary artery bypass graft)  Chronic. Stable. Continue current treatment plan as previously prescribed by PCP.    8. Other forms of angina pectoris  Chronic. Stable. Continue current treatment plan as previously prescribed by PCP.    9. GRANADO (dyspnea on exertion)  Chronic. Stable. Continue current treatment plan as previously prescribed by PCP.    10. S/P drug eluting coronary stent placement  Chronic. Stable. Continue current treatment plan as previously prescribed by PCP.    11. Abnormal stress test  Chronic. Stable. Continue current treatment plan as previously prescribed by PCP.    12. Aortic  atherosclerosis  Chronic. Stable. Continue current treatment plan as previously prescribed by PCP.    13. Chronic heart failure with preserved ejection fraction  Chronic. Stable. Continue current treatment plan as previously prescribed by PCP.    14. Stage 3a chronic kidney disease  Chronic. Stable. Continue current treatment plan as previously prescribed by PCP.    15. Type 2 diabetes mellitus with stage 3a chronic kidney disease, without long-term current use of insulin  Chronic. Stable. Controlled. Last Hgb A1c=6.2 from 9/29/22. Continue current treatment plan as previously prescribed by PCP.    16. Morbid obesity with body mass index (BMI) of 40.0 or higher  Chronic. Stable. Encouraged to increase exercise as tolerated and improve diet to heart healthy, low sodium diet. Continue current treatment plan as previously prescribed by PCP.    17. Vitamin D deficiency, unspecified  Chronic. Stable. Continue current treatment plan as previously prescribed by PCP.    18. Osteoporosis without current pathological fracture, unspecified osteoporosis type  Chronic. Stable. Continue current treatment plan as previously prescribed by PCP.    19. Primary osteoarthritis of left knee  Chronic. Stable. Continue current treatment plan as previously prescribed by PCP.    20. Age-related osteoporosis without current pathological fracture  Chronic. Stable. Continue current treatment plan as previously prescribed by PCP.    21. Shoulder pain, unspecified chronicity, unspecified laterality  Chronic. Stable. Continue current treatment plan as previously prescribed by PCP.    22. Decreased range of motion of left knee  Chronic. Stable. Continue current treatment plan as previously prescribed by PCP.    23. Chronic pain of right knee  Chronic. Stable. Continue current treatment plan as previously prescribed by PCP.    24. KAMRAN on CPAP  Chronic. Stable. Continue current treatment plan as previously prescribed by PCP.    25. Physical  deconditioning  Chronic. Stable. Continue current treatment plan as previously prescribed by PCP.    26. Physical debility  Chronic. Stable. Continue current treatment plan as previously prescribed by PCP.    27. Antalgic gait  Chronic. Stable. Continue current treatment plan as previously prescribed by PCP.        Provided Kaylee with a 5-10 year written screening schedule and personal prevention plan. Recommendations were developed using the USPSTF age appropriate recommendations. Education, counseling, and referrals were provided as needed. After Visit Summary printed and given to patient which includes a list of additional screenings\tests needed.      I offered to discuss end of life issues, including information on how to make advance directives that the patient could use to name someone who would make medical decisions on their behalf if they became too ill to make themselves.    ___Patient declined  _X_Patient is interested, I provided paper work and offered to discuss.    Follow up in about 1 year (around 12/28/2023).    BLAINE White offered to discuss advanced care planning, including how to pick a person who would make decisions for you if you were unable to make them for yourself, called a health care power of , and what kind of decisions you might make such as use of life sustaining treatments such as ventilators and tube feeding when faced with a life limiting illness recorded on a living will that they will need to know. (How you want to be cared for as you near the end of your natural life)     X Patient is interested in learning more about how to make advanced directives.  I provided them paperwork and offered to discuss this with them.

## 2022-12-30 ENCOUNTER — OFFICE VISIT (OUTPATIENT)
Dept: PRIMARY CARE CLINIC | Facility: CLINIC | Age: 77
End: 2022-12-30
Payer: MEDICARE

## 2022-12-30 ENCOUNTER — LAB VISIT (OUTPATIENT)
Dept: LAB | Facility: HOSPITAL | Age: 77
End: 2022-12-30
Attending: INTERNAL MEDICINE
Payer: MEDICARE

## 2022-12-30 VITALS
HEIGHT: 62 IN | WEIGHT: 238.75 LBS | TEMPERATURE: 98 F | BODY MASS INDEX: 43.94 KG/M2 | HEART RATE: 94 BPM | SYSTOLIC BLOOD PRESSURE: 128 MMHG | DIASTOLIC BLOOD PRESSURE: 74 MMHG | OXYGEN SATURATION: 100 %

## 2022-12-30 DIAGNOSIS — D64.9 NORMOCYTIC ANEMIA: ICD-10-CM

## 2022-12-30 DIAGNOSIS — N18.31 TYPE 2 DIABETES MELLITUS WITH STAGE 3A CHRONIC KIDNEY DISEASE, WITH LONG-TERM CURRENT USE OF INSULIN: ICD-10-CM

## 2022-12-30 DIAGNOSIS — E11.22 TYPE 2 DIABETES MELLITUS WITH STAGE 3A CHRONIC KIDNEY DISEASE, WITH LONG-TERM CURRENT USE OF INSULIN: ICD-10-CM

## 2022-12-30 DIAGNOSIS — Z79.4 TYPE 2 DIABETES MELLITUS WITH STAGE 3A CHRONIC KIDNEY DISEASE, WITH LONG-TERM CURRENT USE OF INSULIN: ICD-10-CM

## 2022-12-30 DIAGNOSIS — I25.118 CORONARY ARTERY DISEASE OF NATIVE ARTERY OF NATIVE HEART WITH STABLE ANGINA PECTORIS: ICD-10-CM

## 2022-12-30 DIAGNOSIS — Z79.4 TYPE 2 DIABETES MELLITUS WITH STAGE 3A CHRONIC KIDNEY DISEASE, WITH LONG-TERM CURRENT USE OF INSULIN: Primary | ICD-10-CM

## 2022-12-30 DIAGNOSIS — N18.31 TYPE 2 DIABETES MELLITUS WITH STAGE 3A CHRONIC KIDNEY DISEASE, WITH LONG-TERM CURRENT USE OF INSULIN: Primary | ICD-10-CM

## 2022-12-30 DIAGNOSIS — M81.0 OSTEOPOROSIS WITHOUT CURRENT PATHOLOGICAL FRACTURE, UNSPECIFIED OSTEOPOROSIS TYPE: ICD-10-CM

## 2022-12-30 DIAGNOSIS — E11.22 TYPE 2 DIABETES MELLITUS WITH STAGE 3A CHRONIC KIDNEY DISEASE, WITH LONG-TERM CURRENT USE OF INSULIN: Primary | ICD-10-CM

## 2022-12-30 DIAGNOSIS — I11.9 HYPERTENSIVE HEART DISEASE WITHOUT HEART FAILURE: ICD-10-CM

## 2022-12-30 LAB
ALBUMIN SERPL BCP-MCNC: 3.2 G/DL (ref 3.5–5.2)
ALP SERPL-CCNC: 75 U/L (ref 55–135)
ALT SERPL W/O P-5'-P-CCNC: 24 U/L (ref 10–44)
ANION GAP SERPL CALC-SCNC: 8 MMOL/L (ref 8–16)
AST SERPL-CCNC: 20 U/L (ref 10–40)
BILIRUB SERPL-MCNC: 0.4 MG/DL (ref 0.1–1)
BUN SERPL-MCNC: 19 MG/DL (ref 8–23)
CALCIUM SERPL-MCNC: 9.5 MG/DL (ref 8.7–10.5)
CHLORIDE SERPL-SCNC: 105 MMOL/L (ref 95–110)
CO2 SERPL-SCNC: 25 MMOL/L (ref 23–29)
CREAT SERPL-MCNC: 1.1 MG/DL (ref 0.5–1.4)
EST. GFR  (NO RACE VARIABLE): 52.4 ML/MIN/1.73 M^2
GLUCOSE SERPL-MCNC: 152 MG/DL (ref 70–110)
POTASSIUM SERPL-SCNC: 4.5 MMOL/L (ref 3.5–5.1)
PROT SERPL-MCNC: 7.5 G/DL (ref 6–8.4)
SODIUM SERPL-SCNC: 138 MMOL/L (ref 136–145)

## 2022-12-30 PROCEDURE — 3061F NEG MICROALBUMINURIA REV: CPT | Mod: CPTII,S$GLB,, | Performed by: INTERNAL MEDICINE

## 2022-12-30 PROCEDURE — 99214 OFFICE O/P EST MOD 30 MIN: CPT | Mod: S$GLB,,, | Performed by: INTERNAL MEDICINE

## 2022-12-30 PROCEDURE — 3074F SYST BP LT 130 MM HG: CPT | Mod: CPTII,S$GLB,, | Performed by: INTERNAL MEDICINE

## 2022-12-30 PROCEDURE — 4010F PR ACE/ARB THEARPY RXD/TAKEN: ICD-10-PCS | Mod: CPTII,S$GLB,, | Performed by: INTERNAL MEDICINE

## 2022-12-30 PROCEDURE — 3288F PR FALLS RISK ASSESSMENT DOCUMENTED: ICD-10-PCS | Mod: CPTII,S$GLB,, | Performed by: INTERNAL MEDICINE

## 2022-12-30 PROCEDURE — 1160F PR REVIEW ALL MEDS BY PRESCRIBER/CLIN PHARMACIST DOCUMENTED: ICD-10-PCS | Mod: CPTII,S$GLB,, | Performed by: INTERNAL MEDICINE

## 2022-12-30 PROCEDURE — 3078F PR MOST RECENT DIASTOLIC BLOOD PRESSURE < 80 MM HG: ICD-10-PCS | Mod: CPTII,S$GLB,, | Performed by: INTERNAL MEDICINE

## 2022-12-30 PROCEDURE — 80053 COMPREHEN METABOLIC PANEL: CPT | Performed by: INTERNAL MEDICINE

## 2022-12-30 PROCEDURE — 1159F MED LIST DOCD IN RCRD: CPT | Mod: CPTII,S$GLB,, | Performed by: INTERNAL MEDICINE

## 2022-12-30 PROCEDURE — 3288F FALL RISK ASSESSMENT DOCD: CPT | Mod: CPTII,S$GLB,, | Performed by: INTERNAL MEDICINE

## 2022-12-30 PROCEDURE — 3066F NEPHROPATHY DOC TX: CPT | Mod: CPTII,S$GLB,, | Performed by: INTERNAL MEDICINE

## 2022-12-30 PROCEDURE — 1126F PR PAIN SEVERITY QUANTIFIED, NO PAIN PRESENT: ICD-10-PCS | Mod: CPTII,S$GLB,, | Performed by: INTERNAL MEDICINE

## 2022-12-30 PROCEDURE — 3074F PR MOST RECENT SYSTOLIC BLOOD PRESSURE < 130 MM HG: ICD-10-PCS | Mod: CPTII,S$GLB,, | Performed by: INTERNAL MEDICINE

## 2022-12-30 PROCEDURE — 3078F DIAST BP <80 MM HG: CPT | Mod: CPTII,S$GLB,, | Performed by: INTERNAL MEDICINE

## 2022-12-30 PROCEDURE — 99999 PR PBB SHADOW E&M-EST. PATIENT-LVL V: CPT | Mod: PBBFAC,,, | Performed by: INTERNAL MEDICINE

## 2022-12-30 PROCEDURE — 1101F PT FALLS ASSESS-DOCD LE1/YR: CPT | Mod: CPTII,S$GLB,, | Performed by: INTERNAL MEDICINE

## 2022-12-30 PROCEDURE — 4010F ACE/ARB THERAPY RXD/TAKEN: CPT | Mod: CPTII,S$GLB,, | Performed by: INTERNAL MEDICINE

## 2022-12-30 PROCEDURE — 99214 PR OFFICE/OUTPT VISIT, EST, LEVL IV, 30-39 MIN: ICD-10-PCS | Mod: S$GLB,,, | Performed by: INTERNAL MEDICINE

## 2022-12-30 PROCEDURE — 36415 COLL VENOUS BLD VENIPUNCTURE: CPT | Mod: PN | Performed by: INTERNAL MEDICINE

## 2022-12-30 PROCEDURE — 1101F PR PT FALLS ASSESS DOC 0-1 FALLS W/OUT INJ PAST YR: ICD-10-PCS | Mod: CPTII,S$GLB,, | Performed by: INTERNAL MEDICINE

## 2022-12-30 PROCEDURE — 1126F AMNT PAIN NOTED NONE PRSNT: CPT | Mod: CPTII,S$GLB,, | Performed by: INTERNAL MEDICINE

## 2022-12-30 PROCEDURE — 3061F PR NEG MICROALBUMINURIA RESULT DOCUMENTED/REVIEW: ICD-10-PCS | Mod: CPTII,S$GLB,, | Performed by: INTERNAL MEDICINE

## 2022-12-30 PROCEDURE — 3044F HG A1C LEVEL LT 7.0%: CPT | Mod: CPTII,S$GLB,, | Performed by: INTERNAL MEDICINE

## 2022-12-30 PROCEDURE — 3066F PR DOCUMENTATION OF TREATMENT FOR NEPHROPATHY: ICD-10-PCS | Mod: CPTII,S$GLB,, | Performed by: INTERNAL MEDICINE

## 2022-12-30 PROCEDURE — 1159F PR MEDICATION LIST DOCUMENTED IN MEDICAL RECORD: ICD-10-PCS | Mod: CPTII,S$GLB,, | Performed by: INTERNAL MEDICINE

## 2022-12-30 PROCEDURE — 1160F RVW MEDS BY RX/DR IN RCRD: CPT | Mod: CPTII,S$GLB,, | Performed by: INTERNAL MEDICINE

## 2022-12-30 PROCEDURE — 3044F PR MOST RECENT HEMOGLOBIN A1C LEVEL <7.0%: ICD-10-PCS | Mod: CPTII,S$GLB,, | Performed by: INTERNAL MEDICINE

## 2022-12-30 PROCEDURE — 99999 PR PBB SHADOW E&M-EST. PATIENT-LVL V: ICD-10-PCS | Mod: PBBFAC,,, | Performed by: INTERNAL MEDICINE

## 2022-12-30 RX ORDER — GLIPIZIDE 5 MG/1
TABLET ORAL
Qty: 90 TABLET | Refills: 5 | Status: SHIPPED | OUTPATIENT
Start: 2022-12-30 | End: 2023-02-14
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
## 2023-01-04 ENCOUNTER — PATIENT MESSAGE (OUTPATIENT)
Dept: PRIMARY CARE CLINIC | Facility: CLINIC | Age: 78
End: 2023-01-04
Payer: MEDICARE

## 2023-01-09 ENCOUNTER — PATIENT MESSAGE (OUTPATIENT)
Dept: CARDIOLOGY | Facility: CLINIC | Age: 78
End: 2023-01-09
Payer: MEDICARE

## 2023-01-17 NOTE — PROGRESS NOTES
"Subjective:       Patient ID: Kaylee Romero is a 78 y.o. female.    Chief Complaint: No chief complaint on file.    HPI     78 year old female with CAD s/p 2v CABG (LIMA-LAD and SVG-OM in 8/2019) with subsequent 90% stenosis of distal LIMA graft s/p PCI of high grade LCx and RCA lesions, HFpEF, HTN, HLD, DM2, CKD III, KAMRAN on CPAP, TIA, obesity here for follow-up. She underwent repeat PET in 2021 which showed ischemia in the territory of D1 and subsequent angiography revealed patent grafts and no new focal stenoses requiring intervention. A PET in 2022 showed mild a very small (3%) area of ischemia.    She has had a very busy and stressful month but things are getting back to normal. She is not needing NTG these days and is eating out less than before. She has been exercising using a floor bike, was doing 50-60 minutes uninterrupted until recently. She has some angina at times with exercise. She has also purchased a "total body exerciser" which she plans to use. She denies chest pain/pressure/tightness/discomfort, she has mild dyspnea on exertion, denies orthopnea, PND, peripheral edema, palpitations, syncope or claudication.    Review of Systems   Constitutional:  Negative for fever.   HENT:  Negative for nosebleeds.    Eyes:  Positive for visual disturbance.   Respiratory:  Negative for shortness of breath.    Cardiovascular:  Negative for leg swelling.   Gastrointestinal:  Negative for blood in stool.   Genitourinary:  Negative for hematuria.   Musculoskeletal:  Positive for gait problem.   Skin:  Negative for rash.   Neurological:  Negative for syncope.   All other systems reviewed and are negative.    Objective:       Vitals:    01/23/23 1048   BP: (!) 110/59   Pulse: 86         Physical Exam  Constitutional:       Appearance: She is well-developed. She is not diaphoretic.   HENT:      Head: Normocephalic and atraumatic.   Eyes:      Pupils: Pupils are equal, round, and reactive to light.   Neck:      " Vascular: No carotid bruit or JVD.   Cardiovascular:      Rate and Rhythm: Normal rate and regular rhythm.      Heart sounds: Heart sounds not distant. Murmur (Grade II/VI systolic murmur loudest at RUSB) heard.     No friction rub. No gallop. No S3 or S4 sounds.      Comments: Sternotomy, healed  Pulmonary:      Effort: Pulmonary effort is normal. No respiratory distress.      Breath sounds: Normal breath sounds. No wheezing.   Abdominal:      General: Bowel sounds are normal. There is no distension.      Palpations: Abdomen is soft.      Tenderness: There is no abdominal tenderness.   Musculoskeletal:         General: Swelling (trace bilateral lower extremity edema) present.      Cervical back: Normal range of motion.   Skin:     General: Skin is warm.      Findings: No erythema.   Neurological:      Mental Status: She is alert and oriented to person, place, and time.   Psychiatric:         Behavior: Behavior normal.       Assessment:       1. Coronary 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 pectoris    2. Hx of CABG    3. Aortic atherosclerosis    4. Essential hypertension    5. Dyslipidemia, goal LDL below 70    6. Chronic heart failure with preserved ejection fraction    7. Chronic kidney disease, stage 3a    8. Morbid obesity with body mass index (BMI) of 40.0 or higher    9. KAMRAN on CPAP    10. Nonrheumatic aortic valve stenosis          Plan:       CAD s/p CABG s/p BEA, aortic atherosclerosis  CCS 2  Very small (3%) region of ischemia on 2022 PET  Continue DAPT, high intensity statin, beta blocker, ARB, imdur  Mediterranean style diet information provided previously    HFpEF  Class C, NYHA II  Counseled regarding daily weights and how to increase lasix PRN, sodium and fluid restrictions  We discussed exercising and she will commit to using her floor bike at least 3-4 times per week for at least 5 minutes.  Continue beta blocker, ARB, lasix  SGLT2i may be beneficial, however she  takes many medications and we will only add more if needed    Mild aortic stenosis  Surveillance every 3-5 years, next in 2025, sooner if there is a clinical change    Essential hypertension  Well controlled on current therapy    Dyslipidemia, goal LDL below 70  LDL at goal  Continue statin    History of TIA (transient ischemic attack)  Continue secondary prevention measures as above    Stage 3a chronic kidney disease  Needs good BP/glycemic control    Type 2 diabetes mellitus with stage 3 chronic kidney disease and hypertension  Followed by PCP    Morbid obesity with body mass index (BMI) of 40.0 or higher  Discussed diet/lifestyle modification    KAMRAN on CPAP  Continue CPAP

## 2023-01-23 ENCOUNTER — OFFICE VISIT (OUTPATIENT)
Dept: OPTOMETRY | Facility: CLINIC | Age: 78
End: 2023-01-23
Payer: MEDICARE

## 2023-01-23 ENCOUNTER — OFFICE VISIT (OUTPATIENT)
Dept: CARDIOLOGY | Facility: CLINIC | Age: 78
End: 2023-01-23
Payer: MEDICARE

## 2023-01-23 VITALS
DIASTOLIC BLOOD PRESSURE: 59 MMHG | BODY MASS INDEX: 44.55 KG/M2 | WEIGHT: 242.06 LBS | HEART RATE: 86 BPM | SYSTOLIC BLOOD PRESSURE: 110 MMHG | HEIGHT: 62 IN

## 2023-01-23 DIAGNOSIS — G47.33 OSA ON CPAP: ICD-10-CM

## 2023-01-23 DIAGNOSIS — Z95.1 HX OF CABG: ICD-10-CM

## 2023-01-23 DIAGNOSIS — I25.118 CORONARY ARTERY DISEASE OF NATIVE ARTERY OF NATIVE HEART WITH STABLE ANGINA PECTORIS: Primary | ICD-10-CM

## 2023-01-23 DIAGNOSIS — N18.31 CHRONIC KIDNEY DISEASE, STAGE 3A: ICD-10-CM

## 2023-01-23 DIAGNOSIS — E78.5 DYSLIPIDEMIA, GOAL LDL BELOW 70: ICD-10-CM

## 2023-01-23 DIAGNOSIS — Z86.73 HISTORY OF TIA (TRANSIENT ISCHEMIC ATTACK): ICD-10-CM

## 2023-01-23 DIAGNOSIS — H18.413 ARCUS SENILIS OF BOTH CORNEAS: ICD-10-CM

## 2023-01-23 DIAGNOSIS — H43.813 PVD (POSTERIOR VITREOUS DETACHMENT), BILATERAL: ICD-10-CM

## 2023-01-23 DIAGNOSIS — E66.01 MORBID OBESITY WITH BODY MASS INDEX (BMI) OF 40.0 OR HIGHER: ICD-10-CM

## 2023-01-23 DIAGNOSIS — I35.0 NONRHEUMATIC AORTIC VALVE STENOSIS: ICD-10-CM

## 2023-01-23 DIAGNOSIS — I10 ESSENTIAL HYPERTENSION: ICD-10-CM

## 2023-01-23 DIAGNOSIS — E11.9 TYPE 2 DIABETES MELLITUS WITHOUT OPHTHALMIC MANIFESTATIONS: ICD-10-CM

## 2023-01-23 DIAGNOSIS — H25.13 NS (NUCLEAR SCLEROSIS), BILATERAL: Primary | ICD-10-CM

## 2023-01-23 DIAGNOSIS — I50.32 CHRONIC HEART FAILURE WITH PRESERVED EJECTION FRACTION: ICD-10-CM

## 2023-01-23 DIAGNOSIS — H35.363 RETINAL DRUSEN OF BOTH EYES: ICD-10-CM

## 2023-01-23 DIAGNOSIS — I70.0 AORTIC ATHEROSCLEROSIS: ICD-10-CM

## 2023-01-23 PROCEDURE — 1159F PR MEDICATION LIST DOCUMENTED IN MEDICAL RECORD: ICD-10-PCS | Mod: CPTII,S$GLB,, | Performed by: INTERNAL MEDICINE

## 2023-01-23 PROCEDURE — 3074F PR MOST RECENT SYSTOLIC BLOOD PRESSURE < 130 MM HG: ICD-10-PCS | Mod: CPTII,S$GLB,, | Performed by: INTERNAL MEDICINE

## 2023-01-23 PROCEDURE — 1160F RVW MEDS BY RX/DR IN RCRD: CPT | Mod: CPTII,S$GLB,, | Performed by: OPTOMETRIST

## 2023-01-23 PROCEDURE — 3072F LOW RISK FOR RETINOPATHY: CPT | Mod: CPTII,S$GLB,, | Performed by: INTERNAL MEDICINE

## 2023-01-23 PROCEDURE — 1160F PR REVIEW ALL MEDS BY PRESCRIBER/CLIN PHARMACIST DOCUMENTED: ICD-10-PCS | Mod: CPTII,S$GLB,, | Performed by: OPTOMETRIST

## 2023-01-23 PROCEDURE — 92012 PR EYE EXAM, EST PATIENT,INTERMED: ICD-10-PCS | Mod: S$GLB,,, | Performed by: OPTOMETRIST

## 2023-01-23 PROCEDURE — 99999 PR PBB SHADOW E&M-EST. PATIENT-LVL V: CPT | Mod: PBBFAC,,, | Performed by: INTERNAL MEDICINE

## 2023-01-23 PROCEDURE — 99999 PR PBB SHADOW E&M-EST. PATIENT-LVL V: ICD-10-PCS | Mod: PBBFAC,,, | Performed by: INTERNAL MEDICINE

## 2023-01-23 PROCEDURE — 3074F SYST BP LT 130 MM HG: CPT | Mod: CPTII,S$GLB,, | Performed by: INTERNAL MEDICINE

## 2023-01-23 PROCEDURE — 1126F AMNT PAIN NOTED NONE PRSNT: CPT | Mod: CPTII,S$GLB,, | Performed by: INTERNAL MEDICINE

## 2023-01-23 PROCEDURE — 99499 UNLISTED E&M SERVICE: CPT | Mod: S$GLB,,, | Performed by: INTERNAL MEDICINE

## 2023-01-23 PROCEDURE — 1126F PR PAIN SEVERITY QUANTIFIED, NO PAIN PRESENT: ICD-10-PCS | Mod: CPTII,S$GLB,, | Performed by: INTERNAL MEDICINE

## 2023-01-23 PROCEDURE — 1159F MED LIST DOCD IN RCRD: CPT | Mod: CPTII,S$GLB,, | Performed by: OPTOMETRIST

## 2023-01-23 PROCEDURE — 3078F PR MOST RECENT DIASTOLIC BLOOD PRESSURE < 80 MM HG: ICD-10-PCS | Mod: CPTII,S$GLB,, | Performed by: INTERNAL MEDICINE

## 2023-01-23 PROCEDURE — 99214 PR OFFICE/OUTPT VISIT, EST, LEVL IV, 30-39 MIN: ICD-10-PCS | Mod: S$GLB,,, | Performed by: INTERNAL MEDICINE

## 2023-01-23 PROCEDURE — 1160F RVW MEDS BY RX/DR IN RCRD: CPT | Mod: CPTII,S$GLB,, | Performed by: INTERNAL MEDICINE

## 2023-01-23 PROCEDURE — 92012 INTRM OPH EXAM EST PATIENT: CPT | Mod: S$GLB,,, | Performed by: OPTOMETRIST

## 2023-01-23 PROCEDURE — 1159F MED LIST DOCD IN RCRD: CPT | Mod: CPTII,S$GLB,, | Performed by: INTERNAL MEDICINE

## 2023-01-23 PROCEDURE — 3078F DIAST BP <80 MM HG: CPT | Mod: CPTII,S$GLB,, | Performed by: INTERNAL MEDICINE

## 2023-01-23 PROCEDURE — 3072F PR LOW RISK FOR RETINOPATHY: ICD-10-PCS | Mod: CPTII,S$GLB,, | Performed by: INTERNAL MEDICINE

## 2023-01-23 PROCEDURE — 99499 RISK ADDL DX/OHS AUDIT: ICD-10-PCS | Mod: S$GLB,,, | Performed by: INTERNAL MEDICINE

## 2023-01-23 PROCEDURE — 1160F PR REVIEW ALL MEDS BY PRESCRIBER/CLIN PHARMACIST DOCUMENTED: ICD-10-PCS | Mod: CPTII,S$GLB,, | Performed by: INTERNAL MEDICINE

## 2023-01-23 PROCEDURE — 99999 PR PBB SHADOW E&M-EST. PATIENT-LVL I: ICD-10-PCS | Mod: PBBFAC,,, | Performed by: OPTOMETRIST

## 2023-01-23 PROCEDURE — 99214 OFFICE O/P EST MOD 30 MIN: CPT | Mod: S$GLB,,, | Performed by: INTERNAL MEDICINE

## 2023-01-23 PROCEDURE — 1159F PR MEDICATION LIST DOCUMENTED IN MEDICAL RECORD: ICD-10-PCS | Mod: CPTII,S$GLB,, | Performed by: OPTOMETRIST

## 2023-01-23 PROCEDURE — 99999 PR PBB SHADOW E&M-EST. PATIENT-LVL I: CPT | Mod: PBBFAC,,, | Performed by: OPTOMETRIST

## 2023-01-24 NOTE — PROGRESS NOTES
HPI    Rx ADJUSTMENT    CC: pt states she can see better with old Rx vs newer glasses Rx (both   pair are PAL). C/o Va being blurry w/ new Rx and Va is much clearer to pt   when using/wearing old Rx.     GTTS: NONE  Last edited by Angela Orozco on 1/23/2023  2:38 PM.            Assessment /Plan     For exam results, see Encounter Report.    NS (nuclear sclerosis), bilateral   Mild, monitor    Retinal drusen of both eyes  KAMRAN on CPAP  PVD (posterior vitreous detachment), bilateral  Arcus senilis of both corneas  Essential hypertension  Type 2 diabetes mellitus without ophthalmic manifestations   RTC 4 months for DM DFE    RX specs  Remake OU

## 2023-01-25 ENCOUNTER — PATIENT MESSAGE (OUTPATIENT)
Dept: ENDOCRINOLOGY | Facility: CLINIC | Age: 78
End: 2023-01-25
Payer: MEDICARE

## 2023-01-25 ENCOUNTER — TELEPHONE (OUTPATIENT)
Dept: SLEEP MEDICINE | Facility: CLINIC | Age: 78
End: 2023-01-25
Payer: MEDICARE

## 2023-01-25 ENCOUNTER — PATIENT MESSAGE (OUTPATIENT)
Dept: PRIMARY CARE CLINIC | Facility: CLINIC | Age: 78
End: 2023-01-25
Payer: MEDICARE

## 2023-01-25 ENCOUNTER — PATIENT MESSAGE (OUTPATIENT)
Dept: CARDIOLOGY | Facility: CLINIC | Age: 78
End: 2023-01-25
Payer: MEDICARE

## 2023-01-25 NOTE — TELEPHONE ENCOUNTER
Spoke to patient in regards to the message we received. Both her and her  are scheduled to see Lexi in April

## 2023-01-25 NOTE — TELEPHONE ENCOUNTER
----- Message from Eliza Carson sent at 1/24/2023  3:40 PM CST -----  Regarding: pt appts  Name of Who is Calling:GERARDO HOLM [[id number]]          What is the request in detail: Pt would like a c/b her husbands machine was recalled and she has an issue w/ her motor. She would like to make an appt at the same for both of them together. Please c/b to assist           Can the clinic reply by [facility]:          What Number to Call Back if not in [facility]:[phone]

## 2023-01-31 ENCOUNTER — HOSPITAL ENCOUNTER (OUTPATIENT)
Dept: RADIOLOGY | Facility: HOSPITAL | Age: 78
Discharge: HOME | End: 2023-01-31
Attending: ORTHOPAEDIC SURGERY
Payer: MEDICARE

## 2023-01-31 ENCOUNTER — OFFICE VISIT (OUTPATIENT)
Dept: ORTHOPEDICS | Facility: CLINIC | Age: 78
End: 2023-01-31
Payer: MEDICARE

## 2023-01-31 VITALS — WEIGHT: 237.44 LBS | HEIGHT: 61 IN | BODY MASS INDEX: 44.83 KG/M2

## 2023-01-31 DIAGNOSIS — Z96.652 HISTORY OF TOTAL KNEE ARTHROPLASTY, LEFT: ICD-10-CM

## 2023-01-31 DIAGNOSIS — M25.561 RIGHT KNEE PAIN, UNSPECIFIED CHRONICITY: Primary | ICD-10-CM

## 2023-01-31 DIAGNOSIS — M17.11 PRIMARY OSTEOARTHRITIS OF RIGHT KNEE: ICD-10-CM

## 2023-01-31 DIAGNOSIS — M25.561 RIGHT KNEE PAIN, UNSPECIFIED CHRONICITY: ICD-10-CM

## 2023-01-31 PROCEDURE — 99999 PR PBB SHADOW E&M-EST. PATIENT-LVL IV: CPT | Mod: PBBFAC,,, | Performed by: ORTHOPAEDIC SURGERY

## 2023-01-31 PROCEDURE — 1101F PR PT FALLS ASSESS DOC 0-1 FALLS W/OUT INJ PAST YR: ICD-10-PCS | Mod: CPTII,S$GLB,, | Performed by: ORTHOPAEDIC SURGERY

## 2023-01-31 PROCEDURE — 1160F RVW MEDS BY RX/DR IN RCRD: CPT | Mod: CPTII,S$GLB,, | Performed by: ORTHOPAEDIC SURGERY

## 2023-01-31 PROCEDURE — 3288F FALL RISK ASSESSMENT DOCD: CPT | Mod: CPTII,S$GLB,, | Performed by: ORTHOPAEDIC SURGERY

## 2023-01-31 PROCEDURE — 73562 X-RAY EXAM OF KNEE 3: CPT | Mod: 26,LT,, | Performed by: RADIOLOGY

## 2023-01-31 PROCEDURE — 3288F PR FALLS RISK ASSESSMENT DOCUMENTED: ICD-10-PCS | Mod: CPTII,S$GLB,, | Performed by: ORTHOPAEDIC SURGERY

## 2023-01-31 PROCEDURE — 73564 XR KNEE ORTHO RIGHT WITH FLEXION: ICD-10-PCS | Mod: 26,RT,, | Performed by: RADIOLOGY

## 2023-01-31 PROCEDURE — 73564 X-RAY EXAM KNEE 4 OR MORE: CPT | Mod: 50,TC

## 2023-01-31 PROCEDURE — 73564 X-RAY EXAM KNEE 4 OR MORE: CPT | Mod: 26,RT,, | Performed by: RADIOLOGY

## 2023-01-31 PROCEDURE — 3072F LOW RISK FOR RETINOPATHY: CPT | Mod: CPTII,S$GLB,, | Performed by: ORTHOPAEDIC SURGERY

## 2023-01-31 PROCEDURE — 99213 OFFICE O/P EST LOW 20 MIN: CPT | Mod: S$GLB,,, | Performed by: ORTHOPAEDIC SURGERY

## 2023-01-31 PROCEDURE — 1101F PT FALLS ASSESS-DOCD LE1/YR: CPT | Mod: CPTII,S$GLB,, | Performed by: ORTHOPAEDIC SURGERY

## 2023-01-31 PROCEDURE — 99213 PR OFFICE/OUTPT VISIT, EST, LEVL III, 20-29 MIN: ICD-10-PCS | Mod: S$GLB,,, | Performed by: ORTHOPAEDIC SURGERY

## 2023-01-31 PROCEDURE — 1126F PR PAIN SEVERITY QUANTIFIED, NO PAIN PRESENT: ICD-10-PCS | Mod: CPTII,S$GLB,, | Performed by: ORTHOPAEDIC SURGERY

## 2023-01-31 PROCEDURE — 3072F PR LOW RISK FOR RETINOPATHY: ICD-10-PCS | Mod: CPTII,S$GLB,, | Performed by: ORTHOPAEDIC SURGERY

## 2023-01-31 PROCEDURE — 1159F PR MEDICATION LIST DOCUMENTED IN MEDICAL RECORD: ICD-10-PCS | Mod: CPTII,S$GLB,, | Performed by: ORTHOPAEDIC SURGERY

## 2023-01-31 PROCEDURE — 1160F PR REVIEW ALL MEDS BY PRESCRIBER/CLIN PHARMACIST DOCUMENTED: ICD-10-PCS | Mod: CPTII,S$GLB,, | Performed by: ORTHOPAEDIC SURGERY

## 2023-01-31 PROCEDURE — 1159F MED LIST DOCD IN RCRD: CPT | Mod: CPTII,S$GLB,, | Performed by: ORTHOPAEDIC SURGERY

## 2023-01-31 PROCEDURE — 1126F AMNT PAIN NOTED NONE PRSNT: CPT | Mod: CPTII,S$GLB,, | Performed by: ORTHOPAEDIC SURGERY

## 2023-01-31 PROCEDURE — 73562 XR KNEE ORTHO RIGHT WITH FLEXION: ICD-10-PCS | Mod: 26,LT,, | Performed by: RADIOLOGY

## 2023-01-31 PROCEDURE — 99999 PR PBB SHADOW E&M-EST. PATIENT-LVL IV: ICD-10-PCS | Mod: PBBFAC,,, | Performed by: ORTHOPAEDIC SURGERY

## 2023-02-02 ENCOUNTER — TELEPHONE (OUTPATIENT)
Dept: ORTHOPEDICS | Facility: CLINIC | Age: 78
End: 2023-02-02
Payer: MEDICARE

## 2023-02-02 NOTE — TELEPHONE ENCOUNTER
Returned Call.  for patient to call [phone].    ----- Message from Kenneth Laguerre MA sent at 2/2/2023  4:54 PM CST -----  Contact: [phone]  Pt requested to speak with Liv regarding surgery date. She states she wanted to double check with Liv about it. Please reach out to pt at [phone].

## 2023-02-02 NOTE — PROGRESS NOTES
Subjective:      Patient ID: Kaylee Romero is a 78 y.o. female.    Chief Complaint:   Pain of the Right Knee    HPI  She comes in today to discuss right TKA.  We had previously discussed this, but deferred because of her complex cardiac history.  However, she now finds the pain in her knee disabling and she has discussed it with her cardiologist to approves of her plan to have right TKA.  She has daily pain with ADLs and night pain interfering with sleep.  She has begun to require a walker for ambulation.    From my earlier note of 04/2021:  She comes in for right knee pain.  In 2017, she had left TKA by Dr. [facility].  She is very satisfied with this with minimal discomfort there.  She was planning on eventually having a right knee replacement, but medical problems intervene.  She had a heart attack, unrelated to her previous knee surgery, followed by CABG.  She then had another heart attack and had some stents placed in 2019. She could not do cardiac rehab because her knee pain on the right had become bad enough that she needed a cane.  Eventually she got off the cane and started physical therapy in 01/2020, but then COVID happened and she stayed home.  So, currently her knee pain is too severe for her to do any cardiac rehab.  She has daily pain with ADLs and night pain interfering with sleep.  She is still getting around on a cane, not requiring a walker.  No groin pain, distal numbness or tingling.      Objective:        Ortho/SPM Exam    There is is a mild varus deformity, which is partially passively correctable.  Active range of motion is 5-105 degrees.  Anterior crepitus with active extension.  Patellar mobility is limited.  Boggy synovitis without effusion.  Medial joint line tenderness predominates.  No instability to varus/valgus/Lachman's stressing.  No pain in the groin with flexion and internal rotation of the hip which is not limited.  Skin intact.  Distal neurovascular intact.  Flip test  negative.        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  There is a well-fixed and positioned left total knee arthroplasty without signs of loosening or other complications  Assessment:   End-stage DJD, right knee      Plan:   Right total knee arthroplasty.   I explained to the patient that stiff knees make stiff knee replacements, and that they should not expect to achieve more flexion postoperatively than they have preoperatively.    The surgical process of joint replacement was discussed in detail with the patient including a detailed discussion of the procedure itself (including visual model, x-ray review). The typical perioperative and postoperative course was discussed and perioperative risks were discussed to the patient's satisfaction.  Risks and complications discussed included but were not limited to the risks of anesthetic complications, infection, bleeding, wound healing complications, aseptic loosening, instability, limb length inequality, neurologic dysfunction including numbness,  DVT, pulmonary embolism, perioperative medical risks (cardiac, pulmonary, renal, neurologic), and death and the patient elects to proceed. We will initiate pre-operative medical evaluation and clearance and set a provisional date for surgical intervention according to the patient's schedule. We discussed surgical options including implant type, and why I believe the implant selected is a good match for the patient's needs. The patient expressed understanding and would like to proceed with surgery.     The patient's pathophysiology was explained in detail with reference to x-rays, models, other visual aids as appropriate.  Treatment options were discussed in detail.  Questions were invited and answered to the patient's satisfaction.      Peterson Sharma MD  Orthopedic Surgery

## 2023-02-03 ENCOUNTER — TELEPHONE (OUTPATIENT)
Dept: SPORTS MEDICINE | Facility: CLINIC | Age: 78
End: 2023-02-03
Payer: MEDICARE

## 2023-02-03 ENCOUNTER — TELEPHONE (OUTPATIENT)
Dept: SLEEP MEDICINE | Facility: CLINIC | Age: 78
End: 2023-02-03
Payer: MEDICARE

## 2023-02-03 DIAGNOSIS — G89.29 CHRONIC PAIN OF RIGHT KNEE: Primary | ICD-10-CM

## 2023-02-03 DIAGNOSIS — M25.561 CHRONIC PAIN OF RIGHT KNEE: Primary | ICD-10-CM

## 2023-02-03 DIAGNOSIS — M17.11 PRIMARY OSTEOARTHRITIS OF RIGHT KNEE: Primary | ICD-10-CM

## 2023-02-03 NOTE — TELEPHONE ENCOUNTER
Called and spoke to patient in regards to scheduling her Iovera with Dr. Grant. Patient is scheduled for 2/17/23

## 2023-02-03 NOTE — TELEPHONE ENCOUNTER
LVM for patients in regards to the message we received. Her and her  are scheduled on the same day but we do not have anything sooner for them. They are both on the wait list for sooner appointments

## 2023-02-03 NOTE — TELEPHONE ENCOUNTER
----- Message from Edith Lin sent at 2/3/2023 12:48 PM CST -----  Regarding: Sooner appt  Contact: GERARDO HOLM [[id number]]  Type:  Sooner Apoointment Request    Caller is requesting a sooner appointment.  Caller declined first available appointment listed below.  Caller will not accept being placed on the waitlist and is requesting a message be sent to doctor.  Name of Caller:Mrs. Gerardo Holm    When is the first available appointment?N/A   Symptoms:he rx needs to be renewed for cpap supplies   Would the patient rather a call back or a response via [facility]? Call back   Best Call Back Number:.ptname  Additional Information: before March 9th

## 2023-02-03 NOTE — TELEPHONE ENCOUNTER
----- Message from Berkley Barriga sent at 2/3/2023  9:39 AM CST -----  Contact: GERARDO HOLM [[id number]]  Type: Call Back      Who called: GERARDO HOLM [[id number]]      What is the request in detail: Patient is requesting a call back. Pt would like to know if there are any sooner appointment available. I advised her that I added her to the wait list. She states that she is having surgery March 9 and will need her equipment after her surgery.   Please advise.     Can the clinic reply by [facility]? No      Would the patient rather a call back or a response via [facility]? Call back       Best call back number: [phone] (home) [phone] (work)      Additional Information:

## 2023-02-03 NOTE — TELEPHONE ENCOUNTER
LVM for patient in regards to the message we received. We do not have any sooner appointment available before March. Her and her  are on the wait list for sooner appointments

## 2023-02-10 ENCOUNTER — TELEPHONE (OUTPATIENT)
Dept: PHARMACY | Facility: CLINIC | Age: 78
End: 2023-02-10
Payer: MEDICARE

## 2023-02-10 NOTE — TELEPHONE ENCOUNTER
Hello,       A Patient Assistance Application for Kaylee Romero - MRN  [id number] was faxed to your office @ [phone] Please have ANDREW Fox review the application to ensure the prescription is correct. If correct, sign and fax the application back to the Pharmacy Patient Assistance Team @[phone].      If changes need to be made to this application, please let me know so corrections can be made, and the application will be faxed back to you for approval and signature.         Thank you,    Kaylee Esparza      The signed and completed patient assistance application was received from the providers office and  has been submitted to [email or web address]) for consideration of eligibility.

## 2023-02-10 NOTE — TELEPHONE ENCOUNTER
We have contacted Kaylee Romero to informed her of the re-enrollment process for the  [facility] Patient Assistance Program and what's she needs to provide to continue receiving Basaglar and Trulicity through [facility] Patient Assistance Program.      The following documents may be required to re enroll for the 2023 calendar year: Proof of household Income( such as social security statement, 1099 form, pension statement or 3 consecutive pay stubs, Copy of all Insurance cards( front and back), and OCCP & Patient Authorization Forms

## 2023-02-12 ENCOUNTER — PATIENT MESSAGE (OUTPATIENT)
Dept: ADMINISTRATIVE | Facility: OTHER | Age: 78
End: 2023-02-12
Payer: MEDICARE

## 2023-02-13 ENCOUNTER — PATIENT MESSAGE (OUTPATIENT)
Dept: ADMINISTRATIVE | Facility: OTHER | Age: 78
End: 2023-02-13
Payer: MEDICARE

## 2023-02-14 ENCOUNTER — TELEPHONE (OUTPATIENT)
Dept: PREADMISSION TESTING | Facility: HOSPITAL | Age: 78
End: 2023-02-14
Payer: MEDICARE

## 2023-02-14 ENCOUNTER — OFFICE VISIT (OUTPATIENT)
Dept: INTERNAL MEDICINE | Facility: CLINIC | Age: 78
End: 2023-02-14
Payer: MEDICARE

## 2023-02-14 ENCOUNTER — PATIENT MESSAGE (OUTPATIENT)
Dept: ENDOCRINOLOGY | Facility: CLINIC | Age: 78
End: 2023-02-14
Payer: MEDICARE

## 2023-02-14 VITALS
HEART RATE: 95 BPM | TEMPERATURE: 98 F | DIASTOLIC BLOOD PRESSURE: 58 MMHG | HEIGHT: 62 IN | WEIGHT: 236 LBS | BODY MASS INDEX: 43.43 KG/M2 | SYSTOLIC BLOOD PRESSURE: 106 MMHG | OXYGEN SATURATION: 97 %

## 2023-02-14 DIAGNOSIS — Z01.818 PREOPERATIVE EXAMINATION: Primary | ICD-10-CM

## 2023-02-14 DIAGNOSIS — I50.32 CHRONIC HEART FAILURE WITH PRESERVED EJECTION FRACTION: ICD-10-CM

## 2023-02-14 DIAGNOSIS — M79.604 PAIN OF RIGHT LOWER EXTREMITY: ICD-10-CM

## 2023-02-14 DIAGNOSIS — G47.33 OSA ON CPAP: ICD-10-CM

## 2023-02-14 DIAGNOSIS — I70.0 AORTIC ATHEROSCLEROSIS: ICD-10-CM

## 2023-02-14 DIAGNOSIS — Z79.4 TYPE 2 DIABETES MELLITUS WITH STAGE 3A CHRONIC KIDNEY DISEASE, WITH LONG-TERM CURRENT USE OF INSULIN: ICD-10-CM

## 2023-02-14 DIAGNOSIS — N18.31 TYPE 2 DIABETES MELLITUS WITH STAGE 3A CHRONIC KIDNEY DISEASE, WITHOUT LONG-TERM CURRENT USE OF INSULIN: Chronic | ICD-10-CM

## 2023-02-14 DIAGNOSIS — E78.5 DYSLIPIDEMIA, GOAL LDL BELOW 70: ICD-10-CM

## 2023-02-14 DIAGNOSIS — E11.22 TYPE 2 DIABETES MELLITUS WITH STAGE 3A CHRONIC KIDNEY DISEASE, WITH LONG-TERM CURRENT USE OF INSULIN: ICD-10-CM

## 2023-02-14 DIAGNOSIS — Z86.73 HISTORY OF TIA (TRANSIENT ISCHEMIC ATTACK): ICD-10-CM

## 2023-02-14 DIAGNOSIS — E66.01 MORBID OBESITY WITH BODY MASS INDEX (BMI) OF 40.0 OR HIGHER: ICD-10-CM

## 2023-02-14 DIAGNOSIS — I25.118 CORONARY ARTERY DISEASE OF NATIVE ARTERY OF NATIVE HEART WITH STABLE ANGINA PECTORIS: ICD-10-CM

## 2023-02-14 DIAGNOSIS — E11.22 TYPE 2 DIABETES MELLITUS WITH STAGE 3A CHRONIC KIDNEY DISEASE, WITHOUT LONG-TERM CURRENT USE OF INSULIN: Chronic | ICD-10-CM

## 2023-02-14 DIAGNOSIS — I10 ESSENTIAL HYPERTENSION: Chronic | ICD-10-CM

## 2023-02-14 DIAGNOSIS — N18.31 TYPE 2 DIABETES MELLITUS WITH STAGE 3A CHRONIC KIDNEY DISEASE, WITH LONG-TERM CURRENT USE OF INSULIN: ICD-10-CM

## 2023-02-14 DIAGNOSIS — M17.11 PRIMARY OSTEOARTHRITIS OF RIGHT KNEE: ICD-10-CM

## 2023-02-14 PROCEDURE — 1125F PR PAIN SEVERITY QUANTIFIED, PAIN PRESENT: ICD-10-PCS | Mod: CPTII,S$GLB,, | Performed by: NURSE PRACTITIONER

## 2023-02-14 PROCEDURE — 3072F LOW RISK FOR RETINOPATHY: CPT | Mod: CPTII,S$GLB,, | Performed by: NURSE PRACTITIONER

## 2023-02-14 PROCEDURE — 1159F MED LIST DOCD IN RCRD: CPT | Mod: CPTII,S$GLB,, | Performed by: NURSE PRACTITIONER

## 2023-02-14 PROCEDURE — 99999 PR PBB SHADOW E&M-EST. PATIENT-LVL V: ICD-10-PCS | Mod: PBBFAC,,, | Performed by: NURSE PRACTITIONER

## 2023-02-14 PROCEDURE — 3078F DIAST BP <80 MM HG: CPT | Mod: CPTII,S$GLB,, | Performed by: NURSE PRACTITIONER

## 2023-02-14 PROCEDURE — 99215 PR OFFICE/OUTPT VISIT, EST, LEVL V, 40-54 MIN: ICD-10-PCS | Mod: S$GLB,,, | Performed by: NURSE PRACTITIONER

## 2023-02-14 PROCEDURE — 3074F PR MOST RECENT SYSTOLIC BLOOD PRESSURE < 130 MM HG: ICD-10-PCS | Mod: CPTII,S$GLB,, | Performed by: NURSE PRACTITIONER

## 2023-02-14 PROCEDURE — 3078F PR MOST RECENT DIASTOLIC BLOOD PRESSURE < 80 MM HG: ICD-10-PCS | Mod: CPTII,S$GLB,, | Performed by: NURSE PRACTITIONER

## 2023-02-14 PROCEDURE — 3074F SYST BP LT 130 MM HG: CPT | Mod: CPTII,S$GLB,, | Performed by: NURSE PRACTITIONER

## 2023-02-14 PROCEDURE — 99999 PR PBB SHADOW E&M-EST. PATIENT-LVL V: CPT | Mod: PBBFAC,,, | Performed by: NURSE PRACTITIONER

## 2023-02-14 PROCEDURE — 1159F PR MEDICATION LIST DOCUMENTED IN MEDICAL RECORD: ICD-10-PCS | Mod: CPTII,S$GLB,, | Performed by: NURSE PRACTITIONER

## 2023-02-14 PROCEDURE — 1125F AMNT PAIN NOTED PAIN PRSNT: CPT | Mod: CPTII,S$GLB,, | Performed by: NURSE PRACTITIONER

## 2023-02-14 PROCEDURE — 3072F PR LOW RISK FOR RETINOPATHY: ICD-10-PCS | Mod: CPTII,S$GLB,, | Performed by: NURSE PRACTITIONER

## 2023-02-14 PROCEDURE — 99215 OFFICE O/P EST HI 40 MIN: CPT | Mod: S$GLB,,, | Performed by: NURSE PRACTITIONER

## 2023-02-14 RX ORDER — GLIPIZIDE 5 MG/1
TABLET ORAL
Qty: 90 TABLET | Refills: 5 | Status: SHIPPED | OUTPATIENT
Start: 2023-02-14 | End: 2023-03-06

## 2023-02-14 NOTE — ASSESSMENT & PLAN NOTE
Patient would benefit from weight loss and has set goals to achieve success.   Lifestyle changes should be made by eating healthy, exercising at least 150 minutes weekly, and avoiding sedentary behavior.

## 2023-02-14 NOTE — ASSESSMENT & PLAN NOTE
Currently takes:   glipizide/Trulicity,Basalglar  Most recent A1c is 5.9 .      Denies peripheral neuropathy.   Denies visual changes. Up to date with eye exams.  Micro changes: Denies- Retinopathy, Nephropathy   Macro changes: Denies-  Carotid, Peripheral disease ; Has CAD    Maintain healthy weight. Exercise at least 150 minutes weekly. Encouraged diet rich in nutrients such as fruits, vegetables, and whole grains; reduce sugar intake from cakes, candy, and sugared drinks.      Denies nausea/vomiting, hematuria,  or fatigue. Reports edema.  No changes in urine volume.   Most recent GFR: 58.4   BUN=  17   Cr = 1.0  Last seen by Nephrology 12/20/21  Encouraged to avoid NSAIDs, reduce salt intake, maintain adequate hydration, and exercise.    Tylenol as needed for pain    I  suggest monitoring renal function, input and output status christin-operatively. I  suggest avoiding nephrotoxic medication including NSAIDs, COX2 inhibitors, intravenous contrast agent,avoiding hypotension to prevent further renal impairment.

## 2023-02-14 NOTE — DISCHARGE INSTRUCTIONS
Your surgery has been scheduled for:________3/9/23__________________________________    You should report to:  ____[facility], located on the [place] side of the first floor of the           [facility] ([phone])  _X___[facility], located on the [facility] side of the            Second floor of the [facility] ([phone])  ____3rd Floor [facility] located on the [facility] side of the [facility] [phone]  ____[facility]: located at [address] [place] [address]. [address]     Please Note   Tell your doctor if you take Aspirin, products containing Aspirin, herbal medications  or blood thinners, such as Coumadin, Ticlid, or Plavix.  (Consult your provider regarding holding or stopping before surgery).  Arrange for someone to drive you home following surgery.  You will not be allowed to leave the surgical facility alone or drive yourself home following sedation and anesthesia.    Before Surgery  Stop taking all herbal medications, vitamins, and supplements 7 days prior to surgery  No Motrin/Advil (Ibuprofen) 7 days before surgery  No Aleve (Naproxen) 7 days before surgery  Stop Taking Asprin, products containing Aspirin ___7__days before surgery   No Goody's/BC Powder 7 days before surgery  Refrain from drinking alcoholic beverages for 24 hours before and after surgery  Stop or limit smoking at least 24 hours prior to surgery  You may take Tylenol for pain    Night before Surgery  Do not eat or drink after midnight  Take a shower or bath (shower is recommended).  Bathe with Hibiclens soap or an antibacterial soap from the neck down.  If not supplied by your surgeon, hibiclens soap will need to be purchased over the counter in pharmacy.  Rinse soap off thoroughly.  Shampoo your hair with your regular shampoo    The Day of Surgery  Take another bath or shower with hibiclens or  any antibacterial soap, to reduce the chance of infection.  Take heart and blood pressure medications with a small sip of water, as advised by the perioperative team.  Do not take fluid pills  You may brush your teeth and rinse your mouth, but do not swallow any additional water.   Do not apply perfumes, powder, body lotions or deodorant on the day of surgery.  Nail polish should be removed.  Do not wear makeup or moisturizer  Wear comfortable clothes, such as a button front shirt and loose fitting pants.  Leave all jewelry, including body piercings, and valuables at home.    Bring any devices you will neeed after surgery such as crutches or canes.  If you have sleep apnea, please bring your CPAP machine  In the event that your physical condition changes including the onset of a cold or respiratory illness, or if you have to delay or cancel your surgery, please notify your surgeon.

## 2023-02-14 NOTE — HPI
This is a 78 y.o. female  who presents today with  for a preoperative evaluation in preparation for an  Orthopedic  procedure.  Scheduled for  right knee arthroplasty.   Surgery is indicated for osteoarthritis of right knee.   Patient is new to me.  Details of current problem: The duration of problem is 3-4 years.  S/P left knee arthroplasty in 2017. Pain is becoming progressively worse.   Reports symptoms of  intermittent aching pain to left knee.   Aggravating factors include:  twisting movements, standing, walking, going up/down ramps, and decreased ADLs.    Relieving factors are Tylenol prn.    Reports pain: 6/10 to right knee; uses cane for assistance with mobility.    The history has been obtained by speaking with the patient and reviewing the electronic medical record and/or outside health information. Significant health conditions for the perioperative period are discussed below in assessment and plan.     Patient reports current health status to be Good.  Denies any new symptoms before surgery.

## 2023-02-14 NOTE — ASSESSMENT & PLAN NOTE
Treated with  Lasix; followed per cardiology.     Recent Echo 6/22/22  The left ventricle is normal in size with concentric remodeling and normal systolic function.  The estimated ejection fraction is 68%.  Indeterminate left ventricular diastolic function.  Normal right ventricular size with normal right ventricular systolic function.  There is mild aortic valve stenosis.  Aortic valve area is 2.18 cm2; peak velocity is 2.04 m/s; mean gradient is 11 mmHg.  Intermediate central venous pressure (8 mmHg).  The estimated PA systolic pressure is 27 mmHg.

## 2023-02-14 NOTE — ASSESSMENT & PLAN NOTE
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    S/P BEA x  after CABG in October 2019 to  LAD and proximal RCA   Treated with: ASA/Plavix/Coreg/isosorbide  Denies symptoms of  CP/SOB/PND/Orthopnea/Palpitations/Syncope  Encouraged physical activity of at least 30 minutes of moderate aerobic activity 5 days weekly once recovered from surgery.

## 2023-02-14 NOTE — ASSESSMENT & PLAN NOTE
One episode in 2019 - taking ASA 81 mg  Seen by Vascular Surgery in 5/2019  Denies residual deficits    Carotid Studies: None available

## 2023-02-14 NOTE — ASSESSMENT & PLAN NOTE
Current BP  at goal today.    Taking: amlodipine/Coreg/irbesartan- took meds today  Patient reports home BP readings of: 112-117/50s-60s  Keeping a healthy weight/BMI can help with better BP control    Lifestyle changes to reduce systolic BP:   exercise 30 minutes per day,  5 days per week or 150 minutes weekly; sodium reduction and avoidance of high salt foods such as processed meats, frozen meals and  fast foods.     BP acceptable for surgery. I recommend monitoring BP during perioperative period as uncontrolled pain can elevate blood pressure.

## 2023-02-14 NOTE — ASSESSMENT & PLAN NOTE
Encouraged weight loss, healthy diet (DASH/Mediterranean) and exercise.   Patient should exercise 30 minutes at least five times weekly. Limit alcohol.  Treated with: atorvastatin

## 2023-02-14 NOTE — PROGRESS NOTES
[facility]  Progress Note    Patient Name: Kaylee Romero  MRN: [id number]  Date of Evaluation- 02/20/2023  PCP- Liz Roberts MD    Future cases for Kaylee Romero [[id number]]       Case ID Status Date Time [place] Procedure Provider Location    [id number] [facility] 3/9/2023 11:05  ARTHROPLASTY, KNEE, TOTAL:RIGHT THELMA NEXGEN Peterson Sharma MD [[id number]] NOM OR 2ND FLR            HPI:  This is a 78 y.o. female  who presents today with  for a preoperative evaluation in preparation for an  Orthopedic  procedure.  Scheduled for  right knee arthroplasty.   Surgery is indicated for osteoarthritis of right knee.   Patient is new to me.  Details of current problem: The duration of problem is 3-4 years.  S/P left knee arthroplasty in 2017. Pain is becoming progressively worse.   Reports symptoms of  intermittent aching pain to left knee.   Aggravating factors include:  twisting movements, standing, walking, going up/down ramps, and decreased ADLs.    Relieving factors are Tylenol prn.    Reports pain: 6/10 to right knee; uses cane for assistance with mobility.    The history has been obtained by speaking with the patient and reviewing the electronic medical record and/or outside health information. Significant health conditions for the perioperative period are discussed below in assessment and plan.     Patient reports current health status to be Good.  Denies any new symptoms before surgery.        Subjective/ Objective:     Chief Complaint: Preoperative evaulation, perioperative medical management, and complication reduction plan.     Functional Capacity: Uses Cubii 60-70 minutes x 5-6 days. Denies CP/SOB.       Anesthesia issues: None    Difficulty mouth opening: No    Steroid use in the last 12 months:  No    Dental Issues: No    Family anesthesia difficulty: None       Family Hx of Thrombosis: None    Past Medical History:   Diagnosis Date    Age-related osteoporosis without current  pathological fracture 2019    Anemia     Cataract     CKD (chronic kidney disease) stage 3, GFR 30-59 ml/min     Coronary artery disease     Dry mouth     History of TIA (transient ischemic attack) 2018    Hyperlipidemia 2014    Hypertension     Hypertensive heart disease with diastolic heart failure 2012    Morbid obesity with body mass index (BMI) of 40.0 or higher 2016    KAMRAN (obstructive sleep apnea)     KAMRAN on CPAP 2016    Osteoporosis without current pathological fracture 2018    Physical deconditioning 2018    Status post total left knee replacement 2017    Type 2 diabetes mellitus with stage 3 chronic kidney disease, without long-term current use of insulin 2019         Past Medical History Pertinent Negatives:   Diagnosis Date Noted    *Atrial fibrillation 03/15/2013    Abnormal Pap smear 2013    Alopecia 03/15/2013    Amblyopia 2016    Anxiety 2023    Asthma 03/15/2013    Asthma 2023    Blood transfusion 03/15/2013    Chronic kidney disease 03/15/2013    COPD (chronic obstructive pulmonary disease) 2023    Degenerative disc disease 03/15/2013    Dementia 03/15/2013    Depression 2023    Diabetic retinopathy 2016    Dry eyes 03/15/2013    GERD (gastroesophageal reflux disease) 2023    Glaucoma 03/15/2013    Macular degeneration 2016    Osteoporosis 03/15/2013    Retinal detachment 2016    Scalp tenderness 03/15/2013    Seizures 2023    Sickle cell trait 2016    Strabismus 2016    Substance abuse 03/15/2013    Thyroid disease 2023    Ulcer 03/15/2013    Uveitis 2016         Past Surgical History:   Procedure Laterality Date    ankle surgery (l) Left     APPENDECTOMY       SECTION      CORONARY ARTERY BYPASS GRAFT  09/2019    x 2    CORONARY ARTERY BYPASS GRAFT (CABG) N/A 2019    Procedure: CORONARY ARTERY BYPASS GRAFT (CABG)X3;  Surgeon:  Peterson Ventura MD;  [address];  Service: Cardiovascular;  Laterality: N/A;    CORONARY BYPASS GRAFT ANGIOGRAPHY  10/18/2021    Procedure: Bypass graft study;  Surgeon: Jamar Haddad MD;  Location: [facility] CATH LAB;  Service: Cardiology;;    DILATION AND CURETTAGE OF UTERUS      ENDOSCOPIC HARVEST OF VEIN Left 08/12/2019    Procedure: SURGICAL PROCUREMENT, VEIN, ENDOSCOPIC;  Surgeon: Peterson Ventura MD;  [address];  Service: Cardiovascular;  Laterality: Left;  Vein [place] Start: 0843; End: 1054   Vein Prep Start: 1055; End: 1145    JOINT REPLACEMENT Left     knee replacement    KNEE ARTHROSCOPY W/ DEBRIDEMENT      KNEE SURGERY Left 05/01/2017    TKR    LEFT HEART CATHETERIZATION Left 07/09/2019    Procedure: Left heart cath;  Surgeon: Ronak Gibbons MD;  Location: [facility] CATH LAB;  Service: Cardiology;  Laterality: Left;    LEFT HEART CATHETERIZATION Left 10/18/2021    Procedure: Left heart cath;  Surgeon: Jamar Haddad MD;  Location: [facility] CATH LAB;  Service: Cardiology;  Laterality: Left;       Review of Systems   Constitutional:  Negative for chills, fatigue, fever and unexpected weight change.   HENT:  Negative for dental problem, hearing loss, postnasal drip, rhinorrhea, sore throat, tinnitus and trouble swallowing.         Itchy throat and nose: takes [employer]   Eyes:  Negative for photophobia, pain, discharge and visual disturbance.   Respiratory:  Negative for apnea, cough, chest tightness, shortness of breath and wheezing.    Cardiovascular:  Negative for chest pain, palpitations and leg swelling.   Gastrointestinal:  Negative for abdominal pain, blood in stool, constipation, nausea and vomiting.        Denies Fatty liver, Hepatitis  Fecal Urgency   Endocrine: Negative for cold intolerance and heat intolerance.   Genitourinary:  Negative for decreased urine volume, difficulty urinating, dysuria, frequency, hematuria and urgency.   Musculoskeletal:  Positive  "for arthralgias (right knee) and gait problem. Negative for back pain, neck pain and neck stiffness.   Skin:  Negative for rash and wound.   Neurological:  Negative for dizziness, tremors, seizures, syncope, weakness, numbness and headaches.   Hematological:  Negative for adenopathy. Does not bruise/bleed easily.   Psychiatric/Behavioral:  Negative for confusion, sleep disturbance and suicidal ideas.             VITALS  Visit Vitals  BP (!) 106/58 (BP Location: Left arm, Patient Position: Sitting)   Pulse 95   Temp 97.8 °F (36.6 °C) (Oral)   Ht 5' 2" (1.575 m)   Wt 107 kg (236 lb)   LMP 01/09/2001 (Approximate)   SpO2 97%   BMI 43.16 kg/m²          Physical Exam  Vitals reviewed.   Constitutional:       General: She is not in acute distress.     Appearance: She is well-developed. She is morbidly obese.   HENT:      Head: Normocephalic.      Nose: Nose normal.      Mouth/Throat:      Pharynx: No oropharyngeal exudate.   Eyes:      General:         Right eye: No discharge.         Left eye: No discharge.      Conjunctiva/sclera: Conjunctivae normal.      Pupils: Pupils are equal, round, and reactive to light.   Neck:      Thyroid: No thyromegaly.      Vascular: No carotid bruit or JVD.      Trachea: No tracheal deviation.   Cardiovascular:      Rate and Rhythm: Normal rate and regular rhythm.      Pulses:           Carotid pulses are 2+ on the right side and 2+ on the left side.       Dorsalis pedis pulses are 2+ on the right side and 2+ on the left side.        Posterior tibial pulses are 2+ on the right side and 2+ on the left side.      Heart sounds: Murmur (heard best to second right intercostal space) heard.   Systolic murmur is present with a grade of 2/6.   Pulmonary:      Effort: Pulmonary effort is normal. No respiratory distress.      Breath sounds: Normal breath sounds. No stridor. No wheezing, rhonchi or rales.   Abdominal:      General: Bowel sounds are normal. There is no distension (fchf).      " Palpations: Abdomen is soft.      Tenderness: There is no abdominal tenderness. There is no guarding.   Musculoskeletal:      Cervical back: No pain with movement.      Right lower le+ Pitting Edema present.      Left lower le+ Pitting Edema present.   Lymphadenopathy:      Cervical: No cervical adenopathy.   Skin:     General: Skin is warm and dry.      Capillary Refill: Capillary refill takes less than 2 seconds.      Findings: No erythema or rash.   Neurological:      Mental Status: She is alert and oriented to person, place, and time.      Coordination: Coordination normal.        Significant Labs:  Lab Results   Component Value Date    WBC 4.16 2023    HGB 11.3 (L) 2023    HCT 35.8 (L) 2023     2023    CHOL 88 (L) 2022    TRIG 83 2022    HDL 32 (L) 2022    ALT 19 2023    AST 19 2023     2023    K 4.4 2023     2023    CREATININE 1.0 2023    BUN 17 2023    CO2 24 2023    TSH 3.704 2022    INR 1.1 2023    GLUF 83 2022    HGBA1C 5.9 (H) 2023       Diagnostic Studies: No relevant studies.    EKG:   Results for orders placed or performed during the hospital encounter of 23   EKG 12-lead    Collection Time: 23 12:45 PM    Narrative    Test Reason : I10,    Vent. Rate : 084 BPM     Atrial Rate : 084 BPM     P-R Int : 246 ms          QRS Dur : 080 ms      QT Int : 384 ms       P-R-T Axes : 067 -23 036 degrees     QTc Int : 453 ms    Sinus rhythm with 1st degree A-V block with Premature supraventricular  complexes  Voltage criteria for left ventricular hypertrophy  Abnormal ECG  When compared with ECG of 29-SEP-2022 14:40,  Minor Nonspecific T wave abnormality Now present  Confirmed by BRIAN BUCKLEY MD ([id number]) on 2023 1:22:12 PM    Referred By: LUCAS TRACY           Confirmed By:BRIAN BUCKLEY MD       2D ECHO:  TTE:  Results for orders placed or performed  "during the hospital encounter of 07/22/22   Echo   Result Value Ref Range    Ascending aorta 2.90 cm    STJ 2.74 cm    AV mean gradient 11 mmHg    Ao peak edmar 2.04 m/s    Ao VTI 43.36 cm    IVS 1.00 0.6 - 1.1 cm    LA size 4.24 cm    Left Atrium Major Axis 4.39 cm    Left Atrium Minor Axis 4.49 cm    LVIDd 4.18 3.5 - 6.0 cm    LVIDs 2.73 2.1 - 4.0 cm    LVOT diameter 2.10 cm    LVOT peak VTI 27.34 cm    Posterior Wall 0.99 0.6 - 1.1 cm    MV Peak A Edmar 1.15 m/s    E wave deceleration time 167.39 msec    MV Peak E Edmar 1.22 m/s    PV Peak D Edmar 0.57 m/s    PV Peak S Edmar 0.59 m/s    RA Major Axis 4.26 cm    RA Width 2.98 cm    RVDD 3.25 cm    Sinus 2.48 cm    TAPSE 1.73 cm    TR Max Edmar 2.18 m/s    TDI LATERAL 0.09 m/s    TDI SEPTAL 0.08 m/s    LA WIDTH 3.39 cm    MV stenosis pressure 1/2 time 48.54 ms    LV Diastolic Volume 77.87 mL    LV Systolic Volume 27.83 mL    RV S' 7.97 cm/s    LVOT peak edmar 1.23 m/s    LA volume (mod) 54.09 cm3    MV "A" wave duration 7.71 msec    MV mean gradient 1 mmHg    MV peak gradient 5 mmHg    MV VTI 25.67 cm    LV LATERAL E/E' RATIO 13.56 m/s    LV SEPTAL E/E' RATIO 15.25 m/s    FS 35 %    LA volume 54.24 cm3    LV mass 135.26 g    Left Ventricle Relative Wall Thickness 0.47 cm    AV valve area 2.18 cm2    AV Velocity Ratio 0.60     AV index (prosthetic) 0.63     MV valve area p 1/2 method 4.53 cm2    MV valve area by continuity eq 3.69 cm2    E/A ratio 1.06     Mean e' 0.09 m/s    Pulm vein S/D ratio 1.04     LVOT area 3.5 cm2    LVOT stroke volume 94.65 cm3    AV peak gradient 17 mmHg    E/E' ratio 14.35 m/s    Triscuspid Valve Regurgitation Peak Gradient 19 mmHg    BSA 2.16 m2    LV Systolic Volume Index 13.6 mL/m2    LV Diastolic Volume Index 37.99 mL/m2    LA Volume Index 26.5 mL/m2    LV Mass Index 66 g/m2    LA Volume Index (Mod) 26.4 mL/m2    Right Atrial Pressure (from IVC) 8 mmHg    EF 68 %    TV rest pulmonary artery pressure 27 mmHg    Narrative    · The left ventricle is " normal in size with concentric remodeling and   normal systolic function.  · The estimated ejection fraction is 68%.  · Indeterminate left ventricular diastolic function.  · Normal right ventricular size with normal right ventricular systolic   function.  · There is mild aortic valve stenosis.  · Aortic valve area is 2.18 cm2; peak velocity is 2.04 m/s; mean gradient   is 11 mmHg.  · Intermediate central venous pressure (8 mmHg).  · The estimated PA systolic pressure is 27 mmHg.        CPX 8/2022  Severe functional impairment associated with a normal breathing reserve, normal oxygen saturation, poor effort. These findings are indicative of functional impairment secondary to poor effort.  The ECG portion of this study is negative for myocardial ischemia.  There was no ST segment deviation noted during stress.  The test was stopped because the patient experienced fatigue.  The patient's exercise capacity was severely impaired.  There were no arrhythmias during stress.      PET Stress 7/18/22   There is a very small (< 5%) sized, mid anterior reversible perfusion abnormality involving 3% of LV myocardium in the distribution of the D1.    There are no other significant perfusion abnormalities.    The whole heart absolute myocardial perfusion values averaged 0.61 cc/min/g at rest, which is normal; 0.81 cc/min/g at stress, which is severely reduced; and CFR is 1.34 , which is moderately reduced.    The stress flow is severely reduced diffusely throughout the heart consistent with non- response to vasodilator stress or caffeine.    CT attenuation images demonstrate severe diffuse coronary calcifications in the LAD territory and mild diffuse aortic calcifications.    The gated perfusion images showed an ejection fraction of 77% at rest and 66% during stress. A normal ejection fraction is greater than 53%.    The wall motion is normal at rest.    There is mid anterior wall hypokinesis during stress.    The EKG portion of  this study is negative for ischemia.    There were no arrhythmias during stress.    The patient reported chest pain during the stress test.    When compared to the previous study from 9/24/2021, there are no significant changes in the perfusion pattern taking into account different pharmacologic stress agents.      Angiogram 10/28/19  Three vessel coronary artery disease.  Successful PCI.  Patent LIMA to LAD, however this provides only the apical LAD and is a very small vessel  Patent SVG to OM with 90% distal anastomotic stenosis  Successful PCI with BEA to mid LAD which provides large diagonal branch not bypassed  Successful PCI with BEA to proximal RCA which appeared to be the cullprit vessel  Estimated blood loss: <50 mL           Active Cardiac Conditions: None      Revised Cardiac Risk Index   High -Risk Surgery  Intraperitoneal; Intrathoracic; suprainguinal vascular Yes- + 1 No- 0   History of Ischemic Heart Disease   (Hx of MI/positive exercise test/current chest pain due to ischemia/use of nitrate therapy/EKG with pathological Q waves) Yes- + 1 No- 0   History of CHF  (Pulmonary edema/bilateral rales or S3 gallop/PND/CXR showing pulmonary vascular redistribution) Yes- + 1 No- 0   History of CVA   (Prior stroke or TIA) Yes- + 1 No- 0   Pre-operative treatment with insulin Yes- + 1 No- 0   Pre-operative creatinine > 2mg/dl Yes- + 1 No- 0   Total: 4      Risk Status:  Estimated risk of cardiac complications after non-cardiac surgery using the Revised Cardiac Risk Index for Preoperative risk is 15.0 %      ARISCAT (Canet) risk index: Intermediate: 13.3% risk of post-op pulmonary complications.    American Society of Anesthesiologists Physical Status classification (ASA): 3           No further cardiac workup needed prior to surgery.          Orders Placed This Encounter    CBC Auto Differential    Protime-INR    EKG 12-lead           Assessment/Plan:     History of TIA (transient ischemic attack)  One episode  in 2019 - taking ASA 81 mg  Seen by Vascular Surgery in 5/2019  Denies residual deficits    Carotid Studies: None available    Essential hypertension  Current BP  at goal today.    Taking: amlodipine/Coreg/irbesartan- took meds today  Patient reports home BP readings of: 112-117/50s-60s  Keeping a healthy weight/BMI can help with better BP control    Lifestyle changes to reduce systolic BP:   exercise 30 minutes per day,  5 days per week or 150 minutes weekly; sodium reduction and avoidance of high salt foods such as processed meats, frozen meals and  fast foods.     BP acceptable for surgery. I recommend monitoring BP during perioperative period as uncontrolled pain can elevate blood pressure.           Dyslipidemia, goal LDL below 70  Encouraged weight loss, healthy diet (DASH/Mediterranean) and exercise.   Patient should exercise 30 minutes at least five times weekly. Limit alcohol.  Treated with: atorvastatin    Coronary 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 pectoris  History of MI x 3 : S/P CABG x 2 8/2019.    S/P BEA x  after CABG in October 2019 to  LAD and proximal RCA   Treated with: ASA/Plavix/Coreg/isosorbide  Denies symptoms of  CP/SOB/PND/Orthopnea/Palpitations/Syncope  Encouraged physical activity of at least 30 minutes of moderate aerobic activity 5 days weekly once recovered from surgery.        Aortic atherosclerosis  Taking ASA/atorvastatin- per CT chest 8/2019      Chronic heart failure with preserved ejection fraction  Treated with  Lasix; followed per cardiology.     Recent Echo 6/22/22  The left ventricle is normal in size with concentric remodeling and normal systolic function.  The estimated ejection fraction is 68%.  Indeterminate left ventricular diastolic function.  Normal right ventricular size with normal right ventricular systolic function.  There is mild aortic valve stenosis.  Aortic valve area is 2.18 cm2; peak velocity is 2.04 m/s; mean gradient is 11  mmHg.  Intermediate central venous pressure (8 mmHg).  The estimated PA systolic pressure is 27 mmHg.      Type 2 diabetes mellitus with stage 3 chronic kidney disease, without long-term current use of insulin  Currently takes:   glipizide/Trulicity,Basalglar  Most recent A1c is 5.9 .      Denies peripheral neuropathy.   Denies visual changes. Up to date with eye exams.  Micro changes: Denies- Retinopathy, Nephropathy   Macro changes: Denies-  Carotid, Peripheral disease ; Has CAD    Maintain healthy weight. Exercise at least 150 minutes weekly. Encouraged diet rich in nutrients such as fruits, vegetables, and whole grains; reduce sugar intake from cakes, candy, and sugared drinks.      Denies nausea/vomiting, hematuria,  or fatigue. Reports edema.  No changes in urine volume.   Most recent GFR: 58.4   BUN=  17   Cr = 1.0  Last seen by Nephrology 12/20/21  Encouraged to avoid NSAIDs, reduce salt intake, maintain adequate hydration, and exercise.    Tylenol as needed for pain    I  suggest monitoring renal function, input and output status christin-operatively. I  suggest avoiding nephrotoxic medication including NSAIDs, COX2 inhibitors, intravenous contrast agent,avoiding hypotension to prevent further renal impairment.        Morbid obesity with body mass index (BMI) of 40.0 or higher  Patient would benefit from weight loss and has set goals to achieve success.   Lifestyle changes should be made by eating healthy, exercising at least 150 minutes weekly, and avoiding sedentary behavior.       Primary osteoarthritis of right knee  Scheduled with Dr. Sharma on 3/9/23 for right knee arthroplasty.     KAMRAN on CPAP  CPAP compliance: Yes. Encouraged weight loss, increased exercise.  Recommend caution with sedating medication that can cause respiratory depression.             Discussion/Management of Perioperative Care    Thromboembolic prophylaxis (VTE) Care: Risk factors for thrombosis include: age, morbid obesity, and  surgical procedure.  I recommend prophylaxis of thromboembolism with the use of compression stockings/pneumatic devices, and/or pharmacologic agents. The benefits should outweigh the risks for pharmacologic prophylaxis in the perioperative period. I also encourage early ambulation if not contraindicated during the post-operative period.    Risk factors for post-operative pulmonary complications include:age > 65 years, congestive heart failure, diabetes mellitus, HTN, pre-existing renal disease, surgery > 3 hours, and vascular disease. To reduce the risk of pulmonary complications, prophylactic recommendations include: incentive spirometry use/deep breathing, early ambulation, and pain control.    Risk factors for renal complications include: Pre-existing renal disease, age, diabetes mellitus, HTN, and vascular disease. To reduce the risk of postoperative renal complications, I recommend the patient maintain adequate fluid volume status by drinking 2 liters of water daily.  Avoid/reduce NSAIDS and NELSON-2 inhibitors use as well as IV contrast for renal protection.    I recommend the use of appropriate prophylactic antibiotics to reduce the risk of surgical site infections.    Delirium risk factors include advanced age and decreased mobility. I recommend to avoid/reduce use of benzodiazepine use (not for patients who take on a regular basis), anticholinergics, Benadryl,  and agents that may cause postoperative serotonin syndrome.  Controlled pain can decrease the risk for postop delirium and since opioids are used for postoperative pain control, I suggest using the lowest dose for the shortest amount of time necessary for pain management.     The patient is at an increased risk for urinary retention due to : possible regional anesthesia and advanced age. I recommend to avoid/decrease the use of benzodiazepines, anticholinergics, and Benadryl in the perioperative period. I also recommend using opioids for the shortest  period of time if possible.        Diabetes Mellitus-I recommend monitoring the glucose in the perioperative period ( Before meals and bed time,if the patient is on oral feeds or every 6 hourly ,if the patient is NPO ).   Blood glucose target in hospitalized patients is 140-180. Oral Hypoglycemic agents are generally avoided during the hospital stay . If glucose is consistently elevated ,I recommend using a basal prandial Insulin regimen to control the glucose - as elevated glucose can be associated with adverse surgical outcomes. Please consider involving Hospital Medicine or Endocrinology , if any help is needed with Glucose control. Patient will be instructed based on the pre op clinic guidelines about adjustment of diabetic treatment (If applicable). These guidelines are per recommendations of the Endocrinology department.        This client has a history of heart failure that appears to be compensated at this time. Recommendations regarding heart failure care includes continuation of the Diuretic therapy with close monitoring the renal function and electrolytes during the perioperative period.  Strict Intake and Output,  combined with daily weights will be necessary to prevent fluid volume overload. Consider a sodium restriction of 2gm sodium upon advancing diet. Consideration of the patient's hemodynamic status must be considered when ordering IV fluids during the perioperative period; consider decreased total volume of IV fluids, limited time duration of IV fluids, lower IV rate or complete avoidance if possible/as needed.          This visit was focused on Preoperative evaluation, Perioperative Medical management, complication reduction plans. I suggest that the patient follows up with primary care or relevant sub specialists for ongoing health care.    I appreciate the opportunity to be involved in this patients care. Please feel free to contact me if there were any questions about this  consultation.        I spent a total of 113 minutes on the day of the visit.This includes face to face time and non-face to face time preparing to see the patient (e.g., review of tests), obtaining and/or reviewing separately obtained history, documenting clinical information in the electronic or other health record, independently interpreting results and communicating results to the patient/family/caregiver, or care coordinator.       OK to stay on ASA 81 mg    2/16/23: Optimized per Cardiology (Dr. Mauricio) with instructions to hold Plavix x 7 days.        Patient is optimized for surgery.         Esteban Huber NP  Perioperative Medicine  [facility]

## 2023-02-14 NOTE — OUTPATIENT SUBJECTIVE & OBJECTIVE
Outpatient Subjective & Objective      Chief Complaint: Preoperative evaulation, perioperative medical management, and complication reduction plan.     Functional Capacity: Uses Cubii 60-70 minutes x 5-6 days. Denies CP/SOB.       Anesthesia issues: None    Difficulty mouth opening: No    Steroid use in the last 12 months:  No    Dental Issues: No    Family anesthesia difficulty: None       Family Hx of Thrombosis: None    Past Medical History:   Diagnosis Date    Age-related osteoporosis without current pathological fracture 01/11/2019    Anemia     Cataract     CKD (chronic kidney disease) stage 3, GFR 30-59 ml/min     Coronary artery disease     Dry mouth     History of TIA (transient ischemic attack) 12/11/2018    Hyperlipidemia 12/02/2014    Hypertension     Hypertensive heart disease with diastolic heart failure 11/28/2012    Morbid obesity with body mass index (BMI) of 40.0 or higher 05/09/2016    KAMRAN (obstructive sleep apnea)     KAMRAN on CPAP 06/28/2016    Osteoporosis without current pathological fracture 11/11/2018    Physical deconditioning 11/05/2018    Status post total left knee replacement 5/1/2017 05/16/2017    Type 2 diabetes mellitus with stage 3 chronic kidney disease, without long-term current use of insulin 05/16/2019         Past Medical History Pertinent Negatives:   Diagnosis Date Noted    *Atrial fibrillation 03/15/2013    Abnormal Pap smear 03/13/2013    Alopecia 03/15/2013    Amblyopia 09/01/2016    Anxiety 02/14/2023    Asthma 03/15/2013    Asthma 02/14/2023    Blood transfusion 03/15/2013    Chronic kidney disease 03/15/2013    COPD (chronic obstructive pulmonary disease) 02/14/2023    Degenerative disc disease 03/15/2013    Dementia 03/15/2013    Depression 02/14/2023    Diabetic retinopathy 09/01/2016    Dry eyes 03/15/2013    GERD (gastroesophageal reflux disease) 02/14/2023    Glaucoma 03/15/2013    Macular degeneration 09/01/2016    Osteoporosis 03/15/2013    Retinal detachment  2016    Scalp tenderness 03/15/2013    Seizures 2023    Sickle cell trait 2016    Strabismus 2016    Substance abuse 03/15/2013    Thyroid disease 2023    Ulcer 03/15/2013    Uveitis 2016         Past Surgical History:   Procedure Laterality Date    ankle surgery (l) Left     APPENDECTOMY       SECTION      CORONARY ARTERY BYPASS GRAFT  09/2019    x 2    CORONARY ARTERY BYPASS GRAFT (CABG) N/A 2019    Procedure: CORONARY ARTERY BYPASS GRAFT (CABG)X3;  Surgeon: Peterson Ventura MD;  [facility];  Service: Cardiovascular;  Laterality: N/A;    CORONARY BYPASS GRAFT ANGIOGRAPHY  10/18/2021    Procedure: Bypass graft study;  Surgeon: Jamar Haddad MD;  Location: [facility] CATH LAB;  Service: Cardiology;;    DILATION AND CURETTAGE OF UTERUS      ENDOSCOPIC HARVEST OF VEIN Left 2019    Procedure: SURGICAL PROCUREMENT, VEIN, ENDOSCOPIC;  Surgeon: Peterson Ventura MD;  [facility];  Service: Cardiovascular;  Laterality: Left;  Vein [place] Start: 0843; End: 1054   Vein Prep Start: 1055; End: 1145    JOINT REPLACEMENT Left     knee replacement    KNEE ARTHROSCOPY W/ DEBRIDEMENT      KNEE SURGERY Left 2017    TKR    LEFT HEART CATHETERIZATION Left 2019    Procedure: Left heart cath;  Surgeon: Ronak Gibbons MD;  Location: [facility] CATH LAB;  Service: Cardiology;  Laterality: Left;    LEFT HEART CATHETERIZATION Left 10/18/2021    Procedure: Left heart cath;  Surgeon: Jamar Haddad MD;  Location: [facility] CATH LAB;  Service: Cardiology;  Laterality: Left;       Review of Systems   Constitutional:  Negative for chills, fatigue, fever and unexpected weight change.   HENT:  Negative for dental problem, hearing loss, postnasal drip, rhinorrhea, sore throat, tinnitus and trouble swallowing.         Itchy throat and nose: takes [employer]   Eyes:  Negative for photophobia, pain, discharge and visual disturbance.   Respiratory:   "Negative for apnea, cough, chest tightness, shortness of breath and wheezing.    Cardiovascular:  Negative for chest pain, palpitations and leg swelling.   Gastrointestinal:  Negative for abdominal pain, blood in stool, constipation, nausea and vomiting.        Denies Fatty liver, Hepatitis  Fecal Urgency   Endocrine: Negative for cold intolerance and heat intolerance.   Genitourinary:  Negative for decreased urine volume, difficulty urinating, dysuria, frequency, hematuria and urgency.   Musculoskeletal:  Positive for arthralgias (right knee) and gait problem. Negative for back pain, neck pain and neck stiffness.   Skin:  Negative for rash and wound.   Neurological:  Negative for dizziness, tremors, seizures, syncope, weakness, numbness and headaches.   Hematological:  Negative for adenopathy. Does not bruise/bleed easily.   Psychiatric/Behavioral:  Negative for confusion, sleep disturbance and suicidal ideas.             VITALS  Visit Vitals  BP (!) 106/58 (BP Location: Left arm, Patient Position: Sitting)   Pulse 95   Temp 97.8 °F (36.6 °C) (Oral)   Ht 5' 2" (1.575 m)   Wt 107 kg (236 lb)   LMP 01/09/2001 (Approximate)   SpO2 97%   BMI 43.16 kg/m²          Physical Exam  Vitals reviewed.   Constitutional:       General: She is not in acute distress.     Appearance: She is well-developed. She is morbidly obese.   HENT:      Head: Normocephalic.      Nose: Nose normal.      Mouth/Throat:      Pharynx: No oropharyngeal exudate.   Eyes:      General:         Right eye: No discharge.         Left eye: No discharge.      Conjunctiva/sclera: Conjunctivae normal.      Pupils: Pupils are equal, round, and reactive to light.   Neck:      Thyroid: No thyromegaly.      Vascular: No carotid bruit or JVD.      Trachea: No tracheal deviation.   Cardiovascular:      Rate and Rhythm: Normal rate and regular rhythm.      Pulses:           Carotid pulses are 2+ on the right side and 2+ on the left side.       Dorsalis pedis pulses " are 2+ on the right side and 2+ on the left side.        Posterior tibial pulses are 2+ on the right side and 2+ on the left side.      Heart sounds: Murmur (heard best to second right intercostal space) heard.   Systolic murmur is present with a grade of 2/6.   Pulmonary:      Effort: Pulmonary effort is normal. No respiratory distress.      Breath sounds: Normal breath sounds. No stridor. No wheezing, rhonchi or rales.   Abdominal:      General: Bowel sounds are normal. There is no distension (fchf).      Palpations: Abdomen is soft.      Tenderness: There is no abdominal tenderness. There is no guarding.   Musculoskeletal:      Cervical back: No pain with movement.      Right lower le+ Pitting Edema present.      Left lower le+ Pitting Edema present.   Lymphadenopathy:      Cervical: No cervical adenopathy.   Skin:     General: Skin is warm and dry.      Capillary Refill: Capillary refill takes less than 2 seconds.      Findings: No erythema or rash.   Neurological:      Mental Status: She is alert and oriented to person, place, and time.      Coordination: Coordination normal.        Significant Labs:  Lab Results   Component Value Date    WBC 4.16 2023    HGB 11.3 (L) 2023    HCT 35.8 (L) 2023     2023    CHOL 88 (L) 2022    TRIG 83 2022    HDL 32 (L) 2022    ALT 19 2023    AST 19 2023     2023    K 4.4 2023     2023    CREATININE 1.0 2023    BUN 17 2023    CO2 24 2023    TSH 3.704 2022    INR 1.1 2023    GLUF 83 2022    HGBA1C 5.9 (H) 2023       Diagnostic Studies: No relevant studies.    EKG:   Results for orders placed or performed during the hospital encounter of 23   EKG 12-lead    Collection Time: 23 12:45 PM    Narrative    Test Reason : I10,    Vent. Rate : 084 BPM     Atrial Rate : 084 BPM     P-R Int : 246 ms          QRS Dur : 080 ms      QT Int :  "384 ms       P-R-T Axes : 067 -23 036 degrees     QTc Int : 453 ms    Sinus rhythm with 1st degree A-V block with Premature supraventricular  complexes  Voltage criteria for left ventricular hypertrophy  Abnormal ECG  When compared with ECG of 29-SEP-2022 14:40,  Minor Nonspecific T wave abnormality Now present  Confirmed by BRIAN BUCKLEY MD ([id number]) on 2/16/2023 1:22:12 PM    Referred By: LUCAS TRACY           Confirmed By:BRIAN BUCKLEY MD       2D ECHO:  TTE:  Results for orders placed or performed during the hospital encounter of 07/22/22   Echo   Result Value Ref Range    Ascending aorta 2.90 cm    STJ 2.74 cm    AV mean gradient 11 mmHg    Ao peak lor 2.04 m/s    Ao VTI 43.36 cm    IVS 1.00 0.6 - 1.1 cm    LA size 4.24 cm    Left Atrium Major Axis 4.39 cm    Left Atrium Minor Axis 4.49 cm    LVIDd 4.18 3.5 - 6.0 cm    LVIDs 2.73 2.1 - 4.0 cm    LVOT diameter 2.10 cm    LVOT peak VTI 27.34 cm    Posterior Wall 0.99 0.6 - 1.1 cm    MV Peak A Lor 1.15 m/s    E wave deceleration time 167.39 msec    MV Peak E Lor 1.22 m/s    PV Peak D Lor 0.57 m/s    PV Peak S Lor 0.59 m/s    RA Major Axis 4.26 cm    RA Width 2.98 cm    RVDD 3.25 cm    Sinus 2.48 cm    TAPSE 1.73 cm    TR Max Lor 2.18 m/s    TDI LATERAL 0.09 m/s    TDI SEPTAL 0.08 m/s    LA WIDTH 3.39 cm    MV stenosis pressure 1/2 time 48.54 ms    LV Diastolic Volume 77.87 mL    LV Systolic Volume 27.83 mL    RV S' 7.97 cm/s    LVOT peak lor 1.23 m/s    LA volume (mod) 54.09 cm3    MV "A" wave duration 7.71 msec    MV mean gradient 1 mmHg    MV peak gradient 5 mmHg    MV VTI 25.67 cm    LV LATERAL E/E' RATIO 13.56 m/s    LV SEPTAL E/E' RATIO 15.25 m/s    FS 35 %    LA volume 54.24 cm3    LV mass 135.26 g    Left Ventricle Relative Wall Thickness 0.47 cm    AV valve area 2.18 cm2    AV Velocity Ratio 0.60     AV index (prosthetic) 0.63     MV valve area p 1/2 method 4.53 cm2    MV valve area by continuity eq 3.69 cm2    E/A ratio 1.06     Mean e' 0.09 m/s    " Pulm vein S/D ratio 1.04     LVOT area 3.5 cm2    LVOT stroke volume 94.65 cm3    AV peak gradient 17 mmHg    E/E' ratio 14.35 m/s    Triscuspid Valve Regurgitation Peak Gradient 19 mmHg    BSA 2.16 m2    LV Systolic Volume Index 13.6 mL/m2    LV Diastolic Volume Index 37.99 mL/m2    LA Volume Index 26.5 mL/m2    LV Mass Index 66 g/m2    LA Volume Index (Mod) 26.4 mL/m2    Right Atrial Pressure (from IVC) 8 mmHg    EF 68 %    TV rest pulmonary artery pressure 27 mmHg    Narrative    · The left ventricle is normal in size with concentric remodeling and   normal systolic function.  · The estimated ejection fraction is 68%.  · Indeterminate left ventricular diastolic function.  · Normal right ventricular size with normal right ventricular systolic   function.  · There is mild aortic valve stenosis.  · Aortic valve area is 2.18 cm2; peak velocity is 2.04 m/s; mean gradient   is 11 mmHg.  · Intermediate central venous pressure (8 mmHg).  · The estimated PA systolic pressure is 27 mmHg.        CPX 8/2022  Severe functional impairment associated with a normal breathing reserve, normal oxygen saturation, poor effort. These findings are indicative of functional impairment secondary to poor effort.  The ECG portion of this study is negative for myocardial ischemia.  There was no ST segment deviation noted during stress.  The test was stopped because the patient experienced fatigue.  The patient's exercise capacity was severely impaired.  There were no arrhythmias during stress.      PET Stress 7/18/22   There is a very small (< 5%) sized, mid anterior reversible perfusion abnormality involving 3% of LV myocardium in the distribution of the D1.    There are no other significant perfusion abnormalities.    The whole heart absolute myocardial perfusion values averaged 0.61 cc/min/g at rest, which is normal; 0.81 cc/min/g at stress, which is severely reduced; and CFR is 1.34 , which is moderately reduced.    The stress flow is  severely reduced diffusely throughout the heart consistent with non- response to vasodilator stress or caffeine.    CT attenuation images demonstrate severe diffuse coronary calcifications in the LAD territory and mild diffuse aortic calcifications.    The gated perfusion images showed an ejection fraction of 77% at rest and 66% during stress. A normal ejection fraction is greater than 53%.    The wall motion is normal at rest.    There is mid anterior wall hypokinesis during stress.    The EKG portion of this study is negative for ischemia.    There were no arrhythmias during stress.    The patient reported chest pain during the stress test.    When compared to the previous study from 9/24/2021, there are no significant changes in the perfusion pattern taking into account different pharmacologic stress agents.      Angiogram 10/28/19  Three vessel coronary artery disease.  Successful PCI.  Patent LIMA to LAD, however this provides only the apical LAD and is a very small vessel  Patent SVG to OM with 90% distal anastomotic stenosis  Successful PCI with BEA to mid LAD which provides large diagonal branch not bypassed  Successful PCI with BEA to proximal RCA which appeared to be the cullprit vessel  Estimated blood loss: <50 mL           Active Cardiac Conditions: None      Revised Cardiac Risk Index   High -Risk Surgery  Intraperitoneal; Intrathoracic; suprainguinal vascular Yes- + 1 No- 0   History of Ischemic Heart Disease   (Hx of MI/positive exercise test/current chest pain due to ischemia/use of nitrate therapy/EKG with pathological Q waves) Yes- + 1 No- 0   History of CHF  (Pulmonary edema/bilateral rales or S3 gallop/PND/CXR showing pulmonary vascular redistribution) Yes- + 1 No- 0   History of CVA   (Prior stroke or TIA) Yes- + 1 No- 0   Pre-operative treatment with insulin Yes- + 1 No- 0   Pre-operative creatinine > 2mg/dl Yes- + 1 No- 0   Total: 4      Risk Status:  Estimated risk of cardiac complications  after non-cardiac surgery using the Revised Cardiac Risk Index for Preoperative risk is 15.0 %      ARISCAT (Canet) risk index: Intermediate: 13.3% risk of post-op pulmonary complications.    American Society of Anesthesiologists Physical Status classification (ASA): 3           No further cardiac workup needed prior to surgery.    Outpatient Subjective & Objective

## 2023-02-14 NOTE — TELEPHONE ENCOUNTER
Sent in basket message to  and staff requesting documented cardiac clearance and Plavix instructions for upcoming RTKA sx on 3/9/23 with .

## 2023-02-15 ENCOUNTER — TELEPHONE (OUTPATIENT)
Dept: ENDOCRINOLOGY | Facility: CLINIC | Age: 78
End: 2023-02-15
Payer: MEDICARE

## 2023-02-16 ENCOUNTER — DOCUMENTATION ONLY (OUTPATIENT)
Dept: CARDIOLOGY | Facility: CLINIC | Age: 78
End: 2023-02-16
Payer: MEDICARE

## 2023-02-16 ENCOUNTER — HOSPITAL ENCOUNTER (OUTPATIENT)
Dept: RADIOLOGY | Facility: HOSPITAL | Age: 78
Discharge: HOME | End: 2023-02-16
Attending: NURSE PRACTITIONER
Payer: MEDICARE

## 2023-02-16 ENCOUNTER — OFFICE VISIT (OUTPATIENT)
Dept: ORTHOPEDICS | Facility: CLINIC | Age: 78
End: 2023-02-16
Payer: MEDICARE

## 2023-02-16 ENCOUNTER — HOSPITAL ENCOUNTER (OUTPATIENT)
Dept: CARDIOLOGY | Facility: CLINIC | Age: 78
Discharge: HOME | End: 2023-02-16
Payer: MEDICARE

## 2023-02-16 VITALS — HEIGHT: 62 IN | BODY MASS INDEX: 43.43 KG/M2 | WEIGHT: 236 LBS

## 2023-02-16 DIAGNOSIS — M17.11 PRIMARY OSTEOARTHRITIS OF RIGHT KNEE: Primary | ICD-10-CM

## 2023-02-16 DIAGNOSIS — M17.11 PRIMARY OSTEOARTHRITIS OF RIGHT KNEE: ICD-10-CM

## 2023-02-16 DIAGNOSIS — I10 ESSENTIAL HYPERTENSION: Chronic | ICD-10-CM

## 2023-02-16 PROCEDURE — 93005 EKG 12-LEAD: ICD-10-PCS | Mod: S$GLB,,, | Performed by: NURSE PRACTITIONER

## 2023-02-16 PROCEDURE — 93010 EKG 12-LEAD: ICD-10-PCS | Mod: S$GLB,,, | Performed by: INTERNAL MEDICINE

## 2023-02-16 PROCEDURE — 93010 ELECTROCARDIOGRAM REPORT: CPT | Mod: S$GLB,,, | Performed by: INTERNAL MEDICINE

## 2023-02-16 PROCEDURE — 1125F PR PAIN SEVERITY QUANTIFIED, PAIN PRESENT: ICD-10-PCS | Mod: CPTII,S$GLB,, | Performed by: NURSE PRACTITIONER

## 2023-02-16 PROCEDURE — 3072F LOW RISK FOR RETINOPATHY: CPT | Mod: CPTII,S$GLB,, | Performed by: NURSE PRACTITIONER

## 2023-02-16 PROCEDURE — 93005 ELECTROCARDIOGRAM TRACING: CPT | Mod: S$GLB,,, | Performed by: NURSE PRACTITIONER

## 2023-02-16 PROCEDURE — 3072F PR LOW RISK FOR RETINOPATHY: ICD-10-PCS | Mod: CPTII,S$GLB,, | Performed by: NURSE PRACTITIONER

## 2023-02-16 PROCEDURE — 3288F PR FALLS RISK ASSESSMENT DOCUMENTED: ICD-10-PCS | Mod: CPTII,S$GLB,, | Performed by: NURSE PRACTITIONER

## 2023-02-16 PROCEDURE — 1160F RVW MEDS BY RX/DR IN RCRD: CPT | Mod: CPTII,S$GLB,, | Performed by: NURSE PRACTITIONER

## 2023-02-16 PROCEDURE — 1101F PR PT FALLS ASSESS DOC 0-1 FALLS W/OUT INJ PAST YR: ICD-10-PCS | Mod: CPTII,S$GLB,, | Performed by: NURSE PRACTITIONER

## 2023-02-16 PROCEDURE — 73560 X-RAY EXAM OF KNEE 1 OR 2: CPT | Mod: TC,RT

## 2023-02-16 PROCEDURE — 73560 X-RAY EXAM OF KNEE 1 OR 2: CPT | Mod: 26,RT,, | Performed by: RADIOLOGY

## 2023-02-16 PROCEDURE — 1160F PR REVIEW ALL MEDS BY PRESCRIBER/CLIN PHARMACIST DOCUMENTED: ICD-10-PCS | Mod: CPTII,S$GLB,, | Performed by: NURSE PRACTITIONER

## 2023-02-16 PROCEDURE — 1101F PT FALLS ASSESS-DOCD LE1/YR: CPT | Mod: CPTII,S$GLB,, | Performed by: NURSE PRACTITIONER

## 2023-02-16 PROCEDURE — 99999 PR PBB SHADOW E&M-EST. PATIENT-LVL IV: ICD-10-PCS | Mod: PBBFAC,,, | Performed by: NURSE PRACTITIONER

## 2023-02-16 PROCEDURE — 1125F AMNT PAIN NOTED PAIN PRSNT: CPT | Mod: CPTII,S$GLB,, | Performed by: NURSE PRACTITIONER

## 2023-02-16 PROCEDURE — 99999 PR PBB SHADOW E&M-EST. PATIENT-LVL IV: CPT | Mod: PBBFAC,,, | Performed by: NURSE PRACTITIONER

## 2023-02-16 PROCEDURE — 99214 OFFICE O/P EST MOD 30 MIN: CPT | Mod: S$GLB,,, | Performed by: NURSE PRACTITIONER

## 2023-02-16 PROCEDURE — 3288F FALL RISK ASSESSMENT DOCD: CPT | Mod: CPTII,S$GLB,, | Performed by: NURSE PRACTITIONER

## 2023-02-16 PROCEDURE — 99214 PR OFFICE/OUTPT VISIT, EST, LEVL IV, 30-39 MIN: ICD-10-PCS | Mod: S$GLB,,, | Performed by: NURSE PRACTITIONER

## 2023-02-16 PROCEDURE — 1159F PR MEDICATION LIST DOCUMENTED IN MEDICAL RECORD: ICD-10-PCS | Mod: CPTII,S$GLB,, | Performed by: NURSE PRACTITIONER

## 2023-02-16 PROCEDURE — 73560 XR KNEE 1 OR 2 VIEW RIGHT: ICD-10-PCS | Mod: 26,RT,, | Performed by: RADIOLOGY

## 2023-02-16 PROCEDURE — 1159F MED LIST DOCD IN RCRD: CPT | Mod: CPTII,S$GLB,, | Performed by: NURSE PRACTITIONER

## 2023-02-16 RX ORDER — MUPIROCIN 20 MG/G
1 OINTMENT TOPICAL
Status: CANCELLED | OUTPATIENT
Start: 2023-02-16

## 2023-02-16 RX ORDER — BISACODYL 10 MG
10 SUPPOSITORY, RECTAL RECTAL EVERY 12 HOURS PRN
Status: CANCELLED | OUTPATIENT
Start: 2023-02-16

## 2023-02-16 RX ORDER — METHOCARBAMOL 750 MG/1
750 TABLET, FILM COATED ORAL 3 TIMES DAILY
Status: CANCELLED | OUTPATIENT
Start: 2023-02-16

## 2023-02-16 RX ORDER — ACETAMINOPHEN 500 MG
1000 TABLET ORAL EVERY 6 HOURS
Status: CANCELLED | OUTPATIENT
Start: 2023-02-16

## 2023-02-16 RX ORDER — SODIUM CHLORIDE 9 MG/ML
INJECTION, SOLUTION INTRAVENOUS
Status: CANCELLED | OUTPATIENT
Start: 2023-02-16

## 2023-02-16 RX ORDER — PREGABALIN 75 MG/1
75 CAPSULE ORAL
Status: CANCELLED | OUTPATIENT
Start: 2023-02-16

## 2023-02-16 RX ORDER — SODIUM CHLORIDE 0.9 % (FLUSH) 0.9 %
10 SYRINGE (ML) INJECTION
Status: CANCELLED | OUTPATIENT
Start: 2023-02-16

## 2023-02-16 RX ORDER — ASPIRIN 81 MG/1
81 TABLET ORAL 2 TIMES DAILY
Status: CANCELLED | OUTPATIENT
Start: 2023-02-16

## 2023-02-16 RX ORDER — FENTANYL CITRATE 50 UG/ML
25 INJECTION, SOLUTION INTRAMUSCULAR; INTRAVENOUS EVERY 5 MIN PRN
Status: CANCELLED | OUTPATIENT
Start: 2023-02-16

## 2023-02-16 RX ORDER — TALC
6 POWDER (GRAM) TOPICAL NIGHTLY PRN
Status: CANCELLED | OUTPATIENT
Start: 2023-02-16

## 2023-02-16 RX ORDER — MORPHINE SULFATE 2 MG/ML
2 INJECTION, SOLUTION INTRAMUSCULAR; INTRAVENOUS
Status: CANCELLED | OUTPATIENT
Start: 2023-02-16

## 2023-02-16 RX ORDER — FAMOTIDINE 20 MG/1
20 TABLET, FILM COATED ORAL 2 TIMES DAILY
Status: CANCELLED | OUTPATIENT
Start: 2023-02-16

## 2023-02-16 RX ORDER — NALOXONE HCL 0.4 MG/ML
0.02 VIAL (ML) INJECTION
Status: CANCELLED | OUTPATIENT
Start: 2023-02-16

## 2023-02-16 RX ORDER — CEFAZOLIN SODIUM 2 G/50ML
2 SOLUTION INTRAVENOUS
Status: CANCELLED | OUTPATIENT
Start: 2023-02-16 | End: 2023-02-17

## 2023-02-16 RX ORDER — PREGABALIN 75 MG/1
75 CAPSULE ORAL NIGHTLY
Status: CANCELLED | OUTPATIENT
Start: 2023-02-16

## 2023-02-16 RX ORDER — POLYETHYLENE GLYCOL 3350 17 G/17G
17 POWDER, FOR SOLUTION ORAL DAILY
Status: CANCELLED | OUTPATIENT
Start: 2023-02-16

## 2023-02-16 RX ORDER — OXYCODONE HYDROCHLORIDE 5 MG/1
5 TABLET ORAL
Status: CANCELLED | OUTPATIENT
Start: 2023-02-16

## 2023-02-16 RX ORDER — LIDOCAINE HYDROCHLORIDE 10 MG/ML
1 INJECTION, SOLUTION EPIDURAL; INFILTRATION; INTRACAUDAL; PERINEURAL
Status: CANCELLED | OUTPATIENT
Start: 2023-02-16

## 2023-02-16 RX ORDER — MIDAZOLAM HYDROCHLORIDE 1 MG/ML
4 INJECTION INTRAMUSCULAR; INTRAVENOUS
Status: CANCELLED | OUTPATIENT
Start: 2023-02-16 | End: 2023-02-17

## 2023-02-16 RX ORDER — ACETAMINOPHEN 500 MG
1000 TABLET ORAL
Status: CANCELLED | OUTPATIENT
Start: 2023-02-16

## 2023-02-16 RX ORDER — PROCHLORPERAZINE EDISYLATE 5 MG/ML
5 INJECTION INTRAMUSCULAR; INTRAVENOUS EVERY 6 HOURS PRN
Status: CANCELLED | OUTPATIENT
Start: 2023-02-16

## 2023-02-16 RX ORDER — AMOXICILLIN 250 MG
1 CAPSULE ORAL 2 TIMES DAILY
Status: CANCELLED | OUTPATIENT
Start: 2023-02-16

## 2023-02-16 RX ORDER — FENTANYL CITRATE 50 UG/ML
100 INJECTION, SOLUTION INTRAMUSCULAR; INTRAVENOUS
Status: CANCELLED | OUTPATIENT
Start: 2023-02-16 | End: 2023-02-17

## 2023-02-16 RX ORDER — CEFAZOLIN SODIUM 2 G/50ML
2 SOLUTION INTRAVENOUS
Status: CANCELLED | OUTPATIENT
Start: 2023-02-16

## 2023-02-16 RX ORDER — SODIUM CHLORIDE 9 MG/ML
INJECTION, SOLUTION INTRAVENOUS CONTINUOUS
Status: CANCELLED | OUTPATIENT
Start: 2023-02-16 | End: 2023-02-17

## 2023-02-16 RX ORDER — OXYCODONE HYDROCHLORIDE 5 MG/1
10 TABLET ORAL
Status: CANCELLED | OUTPATIENT
Start: 2023-02-16

## 2023-02-16 RX ORDER — MUPIROCIN 20 MG/G
1 OINTMENT TOPICAL 2 TIMES DAILY
Status: CANCELLED | OUTPATIENT
Start: 2023-02-16 | End: 2023-02-21

## 2023-02-16 RX ORDER — ONDANSETRON 2 MG/ML
4 INJECTION INTRAMUSCULAR; INTRAVENOUS EVERY 8 HOURS PRN
Status: CANCELLED | OUTPATIENT
Start: 2023-02-16

## 2023-02-16 NOTE — PROGRESS NOTES
[facility]    HOME HEALTH ORDERS  FACE TO FACE ENCOUNTER    Patient Name: Kaylee Romero  YOB: 1945    PCP: Liz Roberts MD   PCP Address: [address] / [place] [address]  PCP Phone Number: [phone]  PCP Fax: [phone]    Encounter Date: 02/16/2023    Admit to Home Health    Diagnoses:  There are no hospital problems to display for this patient.      Future Appointments   Date Time Provider Department Center   2/17/2023  4:00 PM Abbey Grant MD [facility] SPMEDPC [place]   2/23/2023 10:30 AM Abbey Fox, NP [facility] ENDODIA [address]   2/27/2023  2:30 PM Jimmy Pizano, ANKIT [facility] NUT OFC [place] [facility]   3/2/2023  2:30 PM Jace Rousseau, PT [facility] REHBOP [facility]   3/7/2023  3:00 PM Donna Gillis DPM NT PODIATR [place]   3/13/2023 11:00 AM TELEMED NURSE, [facility] ORTHO [facility] [address]   3/13/2023  1:00 PM Jace Rousseau, PT [facility] RE[facility]OP [facility]   3/23/2023  2:30 PM Anshul Flaherty, ANDREW [facility] ORTHO [address]   4/12/2023  2:00 PM Lexi Ford, ANDREW [facility] SLEEP [facility]   5/9/2023  1:00 PM [facility]           I have seen and examined this patient face to face today. My clinical findings that support the need for the home health skilled services and home bound status are the following:  Weakness/numbness causing balance and gait disturbance due to Joint Replacement making it taxing to leave home.  Requiring assistive device to leave home due to unsteady gait caused by Joint Replacement.    Allergies:  Review of patient's allergies indicates:   Allergen Reactions    Bactrim [sulfamethoxazole-trimethoprim] Other (See Comments)     Generic version  Sulfa makes her sick    Keflex [cephalexin] Other (See Comments)     Turns orange       Diet: diabetic diet: 2000 calorie    Activities: activity as tolerated    Home Health Admitting Clinician:   SN/PT to complete comprehensive assessment including routine  vital signs. Instruct on disease process and s/s of complications to report to MD. Follow specific home health arthoplasty protocol. Review/verify medication list sent home with the patient at time of discharge  and instruct patient/caregiver as needed. If coumadin ordered, coumadin clinic to manage INR with INR draws 2x per week with a goal to maintain INR between 1.8 and 2.2. Frequency may be adjusted depending on start of care date.    Notify MD if SBP > 160 or < 90; DBP > 90 or < 50; HR > 120 or < 50; Temp > 101    Home Medical Equipment:  Walker, 3-1 bedside commode, transfer tub bench    CONSULTS:    Physical Therapy may admit if patient not on coumadin, PT to perform comprehensive assessment if performing admit visit and generate therapy plan of care. Evaluate for home safety and equipment needs; Establish/upgrade home exercise program. Perform/instruct on therapeutic exercises, gait training, transfer training, and Range of Motion.      OTHER: (only select if patient needs other therapy disciplines)  Occupational Therapy to evaluate and treat. Evaluate home environment for safety and equipment needs. Perform/Instruct on transfers, ADL training, ROM, and therapeutic exercises.      WOUND CARE ORDERS:  Assess Surgical Incision/DSRG each TX  Aquacel AG drsg applied post-op leave on 14 days post op. Call MD if any drainage reaches border to border of drsg horizontally, s/s of infection, temp >101, induration, swelling or redness.  If dressing is removed per MD order, then apply island dressing, change/teach caregiver to perform daily dressing change if island dressing present.    Medications: Review discharge medications with patient and family and provide education.      Cannot display discharge medications since this is not an admission.      I certify that this patient is confined to her home and needs physical therapy and occupational therapy.

## 2023-02-16 NOTE — H&P
CC:  Right knee pain    Kaylee Romero is a 78 y.o. female with history of Right knee pain. Pain is worse with activity and weight bearing.  Patient has experienced interference of activities of daily living due to decreased range of motion and an increase in joint pain and swelling.  Patient has failed non-operative treatment including NSAIDs, corticosteroid injections, viscosupplement injections, and activity modification.  Kaylee Romero currently ambulates using assistive device .     Relevant medical conditions of significance in perioperative period:  DM- on medication managed by pcp  HTN- on medication managed by pcp  CAD with history of CABG and stents  HLD- on medication managed by pcp      Past Medical History:   Diagnosis Date    Age-related osteoporosis without current pathological fracture 2019    Anemia     Cataract     CKD (chronic kidney disease) stage 3, GFR 30-59 ml/min     Coronary artery disease     Dry mouth     History of TIA (transient ischemic attack) 2018    Hyperlipidemia 2014    Hypertension     Hypertensive heart disease with diastolic heart failure 2012    Morbid obesity with body mass index (BMI) of 40.0 or higher 2016    KAMRAN (obstructive sleep apnea)     KAMRAN on CPAP 2016    Osteoporosis without current pathological fracture 2018    Physical deconditioning 2018    Status post total left knee replacement 2017    Type 2 diabetes mellitus with stage 3 chronic kidney disease, without long-term current use of insulin 2019       Past Surgical History:   Procedure Laterality Date    ankle surgery (l) Left     APPENDECTOMY       SECTION      CORONARY ARTERY BYPASS GRAFT  09/2019    x 2    CORONARY ARTERY BYPASS GRAFT (CABG) N/A 2019    Procedure: CORONARY ARTERY BYPASS GRAFT (CABG)X3;  Surgeon: Peterson Ventura MD;  Location: [facility] OR [address] [place] [address];  Service: Cardiovascular;  Laterality: N/A;    CORONARY  BYPASS GRAFT ANGIOGRAPHY  10/18/2021    Procedure: Bypass graft study;  Surgeon: Jamar Haddad MD;  Location: [facility] CATH LAB;  Service: Cardiology;;    DILATION AND CURETTAGE OF UTERUS      ENDOSCOPIC HARVEST OF VEIN Left 08/12/2019    Procedure: SURGICAL PROCUREMENT, VEIN, ENDOSCOPIC;  Surgeon: Peterson Ventura MD;  Location: [facility] OR [facility] FLR;  Service: Cardiovascular;  Laterality: Left;  Vein [place] Start: 0843; End: 1054   Vein Prep Start: 1055; End: 1145    JOINT REPLACEMENT Left     knee replacement    KNEE ARTHROSCOPY W/ DEBRIDEMENT      KNEE SURGERY Left 05/01/2017    TKR    LEFT HEART CATHETERIZATION Left 07/09/2019    Procedure: Left heart cath;  Surgeon: Ronak Gibbons MD;  Location: [facility] CATH LAB;  Service: Cardiology;  Laterality: Left;    LEFT HEART CATHETERIZATION Left 10/18/2021    Procedure: Left heart cath;  Surgeon: Jamar Haddad MD;  Location: [facility] CATH LAB;  Service: Cardiology;  Laterality: Left;       Family History   Problem Relation Age of Onset    Heart attack Mother     Heart disease Father     Stroke Father     Heart failure Father     Diabetes Father     Arrhythmia Father     No Known Problems Brother     Stroke Paternal Grandfather     No Known Problems Son     Breast cancer Neg Hx     Colon cancer Neg Hx     Ovarian cancer Neg Hx     Amblyopia Neg Hx     Blindness Neg Hx     Cancer Neg Hx     Cataracts Neg Hx     Glaucoma Neg Hx     Hypertension Neg Hx     Macular degeneration Neg Hx     Retinal detachment Neg Hx     Strabismus Neg Hx     Thyroid disease Neg Hx     Esophageal cancer Neg Hx        Review of patient's allergies indicates:   Allergen Reactions    Bactrim [sulfamethoxazole-trimethoprim] Other (See Comments)     Generic version  Sulfa makes her sick    Keflex [cephalexin] Other (See Comments)     Turns orange         Current Outpatient Medications:     acetaminophen (TYLENOL) 500 MG tablet, Take 500 mg by mouth 2 (two) times daily as needed for Pain., Disp: ,  "Rfl:     alendronate (FOSAMAX) 70 MG tablet, Take 1 tablet (70 mg total) by mouth every 7 days., Disp: 12 tablet, Rfl: 0    amLODIPine (NORVASC) 10 MG tablet, TAKE 1 TABLET(10 MG) BY MOUTH EVERY DAY, Disp: 90 tablet, Rfl: 2    ammonium lactate (LAC-HYDRIN) 12 % lotion, Apply topically as needed for Dry Skin. On the feet and toenails., Disp: 225 g, Rfl: 6    atorvastatin (LIPITOR) 80 MG tablet, TAKE 1 TABLET(80 MG) BY MOUTH EVERY DAY, Disp: 90 tablet, Rfl: 3    BD BOO 2ND GEN PEN NEEDLE 32 gauge x 5/32" Ndle, USE EVERY DAY, Disp: 100 each, Rfl: 8    blood sugar diagnostic Strp, 1 strip by Misc.(Non-Drug; Combo Route) route once daily., Disp: 100 strip, Rfl: 3    carvediloL (COREG) 25 MG tablet, TAKE 1 TABLET(25 MG) BY MOUTH TWICE DAILY WITH MEALS, Disp: 180 tablet, Rfl: 1    clopidogreL (PLAVIX) 75 mg tablet, TAKE 1 TABLET(75 MG) BY MOUTH EVERY DAY, Disp: 90 tablet, Rfl: 3    COD LIVER OIL ORAL, TAKE 2 CAPSULES BY MOUTH EVERY MORNING AND 1 CAPSULE EVERY EVENING, Disp: , Rfl:     diclofenac sodium (VOLTAREN) 1 % Gel, Apply 2 g topically 4 (four) times daily as needed. To painful area on the feet. (Patient taking differently: Apply 2 g topically 4 (four) times daily as needed. To painful area on the feet and knee), Disp: 500 g, Rfl: 5    diphenoxylate-atropine 2.5-0.025 mg (LOMOTIL) 2.5-0.025 mg per tablet, TAKE 1 TABLET BY MOUTH FOUR TIMES DAILY AS NEEDED FOR DIARRHEA, Disp: 30 tablet, Rfl: 30    dulaglutide (TRULICITY) 4.5 mg/0.5 mL pen injector, Inject 4.5 mg into the skin every 7 days. (Patient taking differently: Inject 4.5 mg into the skin every Sunday.), Disp: 2 mL, Rfl: 3    FEROSUL 325 mg (65 mg iron) Tab tablet, TAKE 1 TABLET BY MOUTH DAILY, Disp: 90 tablet, Rfl: 1    furosemide (LASIX) 20 MG tablet, Take 20 mg by mouth every Sat, Sun, Mon, Wed, & Fri, Disp: 30 tablet, Rfl: 3    glipiZIDE (GLUCOTROL) 5 MG tablet, Take 2 tabs po every morning, 2 tab po every evening. (Patient taking differently: Take 2 tabs " "po every morning, 1 tab po every evening.), Disp: 90 tablet, Rfl: 5    glucagon (GVOKE HYPOPEN 2-PACK) 1 mg/0.2 mL AtIn, Inject 1 Package into the skin as needed., Disp: 2 Syringe, Rfl: 0    insulin glargine,hum.rec.anlog (BASAGLAR KWIKPEN U-100 INSULIN SUBQ), Inject 22 Units into the skin every evening., Disp: , Rfl:     irbesartan (AVAPRO) 300 MG tablet, TAKE 1 TABLET(300 MG) BY MOUTH EVERY EVENING, Disp: 90 tablet, Rfl: 2    isosorbide mononitrate (IMDUR) 30 MG 24 hr tablet, Take 2 tablets (60 mg total) by mouth once daily., Disp: 180 tablet, Rfl: 3    lancets Misc, 1 lancet by Misc.(Non-Drug; Combo Route) route once daily., Disp: 100 each, Rfl: 3    multivitamin (THERAGRAN) per tablet, Take 1 tablet by mouth once daily., Disp: , Rfl:     nitroGLYCERIN (NITROSTAT) 0.4 MG SL tablet, Place 1 tablet (0.4 mg total) under the tongue every 5 (five) minutes as needed for Chest pain., Disp: 25 tablet, Rfl: 6    vitamin D (VITAMIN D3) 1000 units Tab, Take 1,000 Units by mouth twice a week. Monday AND THURSDAYS ONLY, Disp: , Rfl:     aspirin 81 MG Chew, Take 1 tablet (81 mg total) by mouth once daily., Disp: , Rfl: 0    Current Facility-Administered Medications:     hyaluronate sodium, stabilized (MONOVISC) Syrg, , Intra-articular, 1 time in Clinic/HOD, Peterson Sharma MD    Review of Systems:  Constitutional: no fever or chills  Eyes: no visual changes  ENT: no nasal congestion or sore throat  Respiratory: no cough or shortness of breath  Cardiovascular: no chest pain or palpitations  Gastrointestinal: no nausea or vomiting, tolerating diet  Genitourinary: no hematuria or dysuria  Integument/Breast: no rash or pruritis  Hematologic/Lymphatic: no easy bruising or lymphadenopathy  Musculoskeletal: positive for knee pain  Neurological: no seizures or tremors  Behavioral/Psych: no auditory or visual hallucinations  Endocrine: no heat or cold intolerance    PE:  Ht 5' 2" (1.575 m)   Wt 107 kg (236 lb)   LMP 01/09/2001 " (Approximate)   BMI 43.16 kg/m²   General: Pleasant, cooperative, NAD   Gait: antalgic  HEENT: NCAT, sclera nonicteric   Lungs: Respirations clear bilaterally; equal and unlabored.   CV: S1S2; 2+ bilateral upper and lower extremity pulses.   Skin: Intact throughout with no rashes, erythema, or lesions  Extremities: No LE edema,  no erythema or warmth of the skin in either lower extremity.    Right knee exam:  Knee Range of Motion:normal, pain with passive range of motion  Effusion:none  Condition of skin:intact  Location of tenderness:Medial joint line   Strength:normal  Stability: stable to testing    Hip Examination: painless PROM of hip     Radiographs: Radiographs reveal advanced degenerative changes including subchondral cyst formation, subchondral sclerosis, osteophyte formation, joint space narrowing.     Knee Alignment: normal    Diagnosis: Primary osteoarthritis Right knee    Plan: Right total knee arthroplasty    Due to the serious nature of total joint infection and the high prevalence of community acquired MRSA, vancomycin will be used perioperatively.

## 2023-02-16 NOTE — PROGRESS NOTES
Kaylee Romero is a 78 y.o. year old here today for pre surgery optimization visit  in preparation for a Right total knee arthroplasty to be performed by Dr. Sharma  on 3/9/2023.  she was last seen and treated in the clinic on 1/31/2023. she will be medically optimized by the pre op center. There has been no significant change in medical status since last visit. No fever, chills, malaise, or unexplained weight change.      Allergies, Medications, past medical and surgical history reviewed.    Focused examination performed.    Patient declined to see surgeon today. All questions answered. Patient encouraged to call with questions. Contact information given.     Pre, crhistin, and post operative procedures and expectations discussed. Goals of successful surgery reviewed and include manageable pain levels, surgical site free of infection, medication management, and ambulation with PT/OT assistance. Healthy weight management discussed with patient and caregiver who were receptive to eduction of healthy diet and activity. No other necessary lifestyle changes identified. Educated patient about signs and symptoms of infection, medication management, anticoagulation therapy, risk of tobacco and alcohol use, and self-care to promote healing. Surgical guide given for future reference. Hibiclens given to patient with instructions. All questions were answered.     Kaylee Romero verbalized an understanding to the education and goals. Patient has displayed readiness to engage in care and is ready to proceed with surgery.  Patient reports her  is able and ready to provide assistance at home after discharge.    Surgical and blood consents signed.    Kaylee Romero will contact us if there are any questions, concerns, or changes in medical status prior to surgery.       Joint class: 2/16    Patient has discussed discharge planning with surgeon. Patient will be discharged to home following surgery.   patient will be  scheduled with Home Health during hospitalization.     30 minutes of time was spent on patient education, review of records, templating, H&P, , appointment scheduling and optimizing patient for surgery.

## 2023-02-16 NOTE — PROGRESS NOTES
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    With medical therapy, diet and lifestyle changes she has improver her functional status and has less frequent angina. No further cardiovascular testing is recommended prior to upcoming total knee arthroplasty absent a clinical change.    Can hold plavix up to 5 days prior to procedure, resume following the procedure absent contraindications.    Royce Mauricio MD

## 2023-02-17 ENCOUNTER — PROCEDURE VISIT (OUTPATIENT)
Dept: SPORTS MEDICINE | Facility: CLINIC | Age: 78
End: 2023-02-17
Payer: MEDICARE

## 2023-02-17 ENCOUNTER — TELEPHONE (OUTPATIENT)
Dept: SPORTS MEDICINE | Facility: CLINIC | Age: 78
End: 2023-02-17
Payer: MEDICARE

## 2023-02-17 VITALS
SYSTOLIC BLOOD PRESSURE: 150 MMHG | HEIGHT: 62 IN | BODY MASS INDEX: 43.24 KG/M2 | DIASTOLIC BLOOD PRESSURE: 91 MMHG | WEIGHT: 235 LBS | HEART RATE: 94 BPM

## 2023-02-17 DIAGNOSIS — G89.29 CHRONIC PAIN OF RIGHT KNEE: ICD-10-CM

## 2023-02-17 DIAGNOSIS — M25.561 CHRONIC PAIN OF RIGHT KNEE: ICD-10-CM

## 2023-02-17 PROCEDURE — 99499 UNLISTED E&M SERVICE: CPT | Mod: S$GLB,,, | Performed by: STUDENT IN AN ORGANIZED HEALTH CARE EDUCATION/TRAINING PROGRAM

## 2023-02-17 PROCEDURE — 99499 NO LOS: ICD-10-PCS | Mod: S$GLB,,, | Performed by: STUDENT IN AN ORGANIZED HEALTH CARE EDUCATION/TRAINING PROGRAM

## 2023-02-17 PROCEDURE — 64640 PR DESTRUCT BY NEURO AGENT; OTHER PERIPH NERVE: ICD-10-PCS | Mod: RT,S$GLB,, | Performed by: STUDENT IN AN ORGANIZED HEALTH CARE EDUCATION/TRAINING PROGRAM

## 2023-02-17 PROCEDURE — 64640 INJECTION TREATMENT OF NERVE: CPT | Mod: RT,S$GLB,, | Performed by: STUDENT IN AN ORGANIZED HEALTH CARE EDUCATION/TRAINING PROGRAM

## 2023-02-17 NOTE — PROCEDURES
Procedures CC: right knee pain    78 y.o. Female presents today for Iovera procedure for right knee pain.    INFORMED CONSENT: I verify that I personally obtained Kaylee Romero's consent which was signed and uploaded into [employer].     PAIN SCORE:  Pre-procedure:  8/10  Gait: antalgic assisted with cane    Inspection/Palpation:   +Skin warm and dry without open wounds or erythema  -Rubor   -Calor    Procedure Note Iovera:    DATE OF PROCEDURE:  02/17/2023    PREOPERATIVE DIAGNOSIS: right knee pain.     POSTOPERATIVE DIAGNOSIS: right knee pain.     PROCEDURE: Iovera treatment of anterior femoral cutaneous nerve, Lateral femoral cut nerve, and superior and inferior branches of infrapatellar saphenous nerve using at least 4+ different punctures to treat all 4 nerves. (cpt 64640 x4)    COMPLICATIONS: None.     IMPLANTS:  None    ESTIMATED BLOOD LOSS:  < 5cc    SPECIMENS REMOVED:  None    ANESTHESIA: 20cc Local xylocaine with epinephrine    INDICATIONS FOR PROCEDURE: This is a 78 y.o. female with longstanding knee pain. They have failed non operative management including injections.I discussed a new treatment therapy called Iovera, which is cryotherapy, to provide symptomatic relief along the sensory distribution of the infrapatellar tendon branch of the saphenous nerve  and AFCN. The patient elected to move forward with this  We did discuss the fact that this is a fairly novel procedure and there is very limited scientific data around this.  However, it FDA approved.  The patient was given patient information and literature to review prior to the procedure as well.  Based on this, the patient agreed to move forward with doing the procedure.    Time was spent discussing the cryoanalgesia procedure Iovera with the patient.  Risks and benefits were also discussed with patient.  X-rays were reviewed to use as a diagram to explain procedure. A detailed description of landmarks and placement of anesthetic used during  procedure were also explained to patient.  Contraindications for procedure were discussed with patient.    CONSENT:  Verbal and written consent were obtained from the patient.     PROCEDURE:    A surgical time out was performed, including verification of patient ID, procedure to be performed, site and side, availability of information and equipment, review of safety issues, and the patients agreement and consent.  The patient was placed supine on the exam table and the proximal medial aspect of the right tibia and anterior aspect of distal femur was prepped with sterile Chlorhexidine and alcohol.  A line was drawn extending approximately 5 cm medial to inferior pole of the patella distally to a point approximately 5 cm medial to the tibial tubercle.  A second line was drawn in a medial to lateral direction the width of the patella approximately 7 cm proximal to the patella. We then infiltrated the skin with lidocaine with epinephrine along both lines using a 25g needle. We then introduced the Iovera device along these lines and this device penetrated the skin, creating cryotherapy to the superior and inferior branches of the infrapatellar saphenous nerve, a third treatment to the anterior femoral cutaneous nerve, and fourth LFCN. 7 punctures of the skin were made to treat the superior and inferior branches of the ISN and another 7 punctures were made to treat the AFCN and LFCN. There were a total of 4 nerves treated with iovera. The patient tolerated the procedure well with no problems.    PAIN SCORES:  Pre-procedure:  8/10  Post-procedure:  2/10  Gait: antalgic assisted with cane      ASSESSMENT:      ICD-10-CM ICD-9-CM   1. Chronic pain of right knee  M25.561 719.46    G89.29 338.29         PLAN:    All questions were answered to the best of my ability and all concerns were addressed at this time.    This note is dictated using the M*Modal Fluency Direct word recognition program. There are word recognition  mistakes that are occasionally missed on review.

## 2023-02-17 NOTE — TELEPHONE ENCOUNTER
Called patient in regards to her 4pm Iovera appointment.  No answer, left voicemail   Alcohol abuse    Anxiety    Depression    Diabetes    MRSA cellulitis    Pancreatitis

## 2023-02-20 ENCOUNTER — PATIENT MESSAGE (OUTPATIENT)
Dept: ADMINISTRATIVE | Facility: OTHER | Age: 78
End: 2023-02-20
Payer: MEDICARE

## 2023-02-24 ENCOUNTER — PATIENT MESSAGE (OUTPATIENT)
Dept: ENDOCRINOLOGY | Facility: CLINIC | Age: 78
End: 2023-02-24
Payer: MEDICARE

## 2023-02-27 ENCOUNTER — PATIENT MESSAGE (OUTPATIENT)
Dept: ADMINISTRATIVE | Facility: OTHER | Age: 78
End: 2023-02-27
Payer: MEDICARE

## 2023-02-27 ENCOUNTER — PATIENT MESSAGE (OUTPATIENT)
Dept: ORTHOPEDICS | Facility: CLINIC | Age: 78
End: 2023-02-27
Payer: MEDICARE

## 2023-02-27 NOTE — PROGRESS NOTES
Patient ID: Kaylee Romero is a 78 y.o. female    Chief Complaint:   No chief complaint on file.    HPI: Kaylee Romero with type 2 diabetes complicated by CAD s/p CABG, TIAs, hypertension, dyslipidemia, CKD Stage 3, obesity, osteoporosis, and KAMRAN presents for follow up of Type 2 diabetes and osteoporosis. Previous patient of mine. Last visit in Jan 2022.      Was seen in the hospital by KAVON Pritchard NP in 9/2019 because of a Surgical Procedure and Date: CABG 8/12/19.  He started her on levemir daily.     At her Sept 2020 she had a steroid injection and gave extra long acting insulin. Interim visit for lower blood sugars in Sept 2020 (BGs in 40-60's) and we stopped her basaglar as she gave up sugar.     At her previous visits, she would stop and restart insulin based on what she was eating -sweets daily.     CGMS in Jan 2022  Average glucose: 158   Above 180 mg/dL: 30.9% (Above 250 mg/dL: 4.0%)   Within  mg/dL: 67.5%   Below 70 mg/dL: 1.7%  (Below 54 mg/dL: <1%)    Continue   Trulicity 4.5 mg weekly   Glipizide 10 mg twice daily with meals   Decrease   basaglar to 22 units daily      Since her last visit, she has seen cardiologist for clearance of TKR. She will have right knee replacement in March 2023.She has also seen diabetes education since her last visit and has started exercising 5-6 nights weekly 75 minutes a night.     With regards to the diabetes:     Diagnosed with Type 2 diabetes: 2013  Family history of Type 2 diabetes: father  Known complications:  DKA-  RN-; not reviewed; 1/2023  PN- seeing podiatry 12/2022  Nephropathy: now seeing nephrology 12/2021  Gastroparesis: denies   CAD: 3 MI and one stroke     Current regimen:  Trulicity 3.0 mg weekly-was on 4.5 mg weekly but unable to locate from PAP and needs to reapply   Glipizide 10 mg with breakfast and 5 mg with dinner  basaglar 22 units daily whenever her  injects    NOVOLOG PRN steroid injection-none recent    Other medications  tried:  Jardiance-insurance issues  Brad Whitlock - did not want to start due to SE profile     Glucose Monitor:  She is checking blood sugar as below  Log reviewed: yes, digital medicine program      Hypoglycemia: denies and denies s/s of hypoglycemia   BG of 72 as above was at 1500 when she was fasting    Denies symptoms of hypoglycemia-hypoglycemia unawareness   Knows how to correct with 15 grams of carbs- juice, coke, or a peppermint.      Diet/Exercise: She feels that she is trying to follow diabetic diet. She is   eats 1-2 meals a day. She is eating off a small plate.  Gave up sweets for LENT.   Breakfast: cottage cheese with fruit or activa or fried egg with pepper and ham or turkey and cheese with bread   Lunch sometimes skipped  Dinner sometimes skipped    She eats nuts in the afternoon   Main meal is vegetable and meat sometimes rice   Snacks: nuts and something sweet nightly.     Drinks: mostly water; OJ with breakfast; sometimes has lemon lime soft drink at times  Exercise - tries to stay active. No formal exercise. Has right knee pain. Using wheelchair today.   She was going to PT. She is doing cycling for 20-30 minutes on floor bike. She is also doing stretching. She would like to go back to therapy but stopped because she did not think she was getting      Education - last visit: saw in Nov 2022  Has GVOKE    Denies history of pancreatitis & personal/family history of medullary thyroid cancer.    Lab Results   Component Value Date    HGBA1C 5.9 (H) 02/16/2023    HGBA1C 6.2 (H) 09/29/2022    HGBA1C 6.4 (H) 06/23/2022     Screening or Prevention Patient's value Goal Complete/Controlled?   HgA1C Testing and Control   Lab Results   Component Value Date    HGBA1C 5.9 (H) 02/16/2023      Annually/Less than 8% Yes   Lipid profile : 09/29/2022 Annually Yes   LDL control Lab Results   Component Value Date    LDLCALC 39.4 (L) 09/29/2022    Annually/Less than 100 mg/dl  Yes   Nephropathy screening Lab  Results   Component Value Date    LABMICR <5.0 02/21/2022     Lab Results   Component Value Date    PROTEINUA Negative 06/10/2022    Annually No   Blood pressure BP Readings from Last 1 Encounters:   03/02/23 105/60    Less than 140/90 Yes   Dilated retinal exam : 01/23/2023 Annually Yes   Foot exam   : 08/22/2022 Annually Yes     With regards to osteoporosis:   Current meds: Fosamax  Started: 6/2019    Family history: mother     Calcium intake- no calcium supplementation; has in diet   Vit D intake-  1000 twice weekly   Weight bearing exercise-   Walking as tolerated with knee pain    Recent falls- August 2018; September 2018 - no fractures and no falls from a standing position.      She fell in July 2022 stepped on her sons foot. No fractures      They have made fall precautions in house   No recent fractures   No significant height loss (>2 inches)     tob use -  None  etoh use- some 1-2 glasses of wine every once in awhile     No current diarrhea or h/o malabsorption     Menopause at age 56     Denies chronic exposure to anticoagulants, proton pump inhibitors or antiseizure medications.   +Steroid injections knees     She is using cane    Denies GERD, indigestion, or gastric bypass surgery.      Denies history of thyroid disease, anemia, kidney stones or kidney disease.      Denies active malignancy, history of malignancy the involved the bone, prior radiation treatment, or unexplained elevations of alk phos on labs      BMD 5/24/21  FINDINGS:  Lumbar spine (L1-L4):              BMD is 1.115 g/cm2, T-score is 0.6, and Z-score is 2.3.   Total hip:                               BMD is 0.838 g/cm2, T-score is -0.9, and Z-score is 0.0.   Femoral neck:                         BMD is 0.624 g/cm2, T-score is -2.0, and Z-score is -0.8.   FRAX:   7.7% risk of a major osteoporotic fracture in the next 10 years.  1.6% risk of hip fracture in the next 10 years.  Impression:  1. Osteoporosis on treatment with Fosamax  2.  Compared with previous DXA, BMD at the lumbar spine has increased by 4.3%, and the BMD at the total hip has increased by 3.6%.  RECOMMENDATIONS:  RECOMMENDATIONS of [facility]:     1. Recommend daily calcium intake 0822-2069 mg, dietary sources preferred; Vitamin D 6143-4587 IU daily.  2. Adequate exercise and fall precautions.  3. Recommend continued treatment with Fosamax  4. Repeat BMD in 2 years    With regards to the vitamin d deficiency and hypercalcemia,     Transient increase in calcium level in Sept 2021 that normalized when we decrease Vitamin D and stopped chlorthalidone     Currently taking otc 1000 twice weekly    Lab Results   Component Value Date    PTH 53.1 01/06/2022    CALCIUM 9.7 02/16/2023    PHOS 2.4 (L) 06/21/2022     Lab Results   Component Value Date    ALBUMIN 3.5 02/16/2023     Vit D, 25-Hydroxy   Date Value Ref Range Status   11/26/2021 66 30 - 96 ng/mL Final     Comment:     Vitamin D deficiency.........<10 ng/mL                              Vitamin D insufficiency......10-29 ng/mL       Vitamin D sufficiency........> or equal to 30 ng/mL  Vitamin D toxicity............>100 ng/mL       Denies constipation, depression,   Increased polyuria due to lasix  + muscle aches or pains.    No recent fractures     Does report some diarrhea, especially if eating out. States this has been ongoing for the last couple of years. Has seen GI in the past. Does take Lomtil with some relief.     Review of Systems   Constitutional:  Negative for fatigue and unexpected weight change.   Eyes:  Negative for visual disturbance.   Endocrine: Negative for polydipsia, polyphagia and polyuria.   Musculoskeletal:  Positive for arthralgias.   Hematological:  Does not bruise/bleed easily.     OBJECTIVE    Physical Exam  Vitals reviewed.   Constitutional:       Appearance: Normal appearance.   Pulmonary:      Effort: Pulmonary effort is normal.   Musculoskeletal:      Comments: In  "wheelchair today   Neurological:      Mental Status: She is alert.   injection sites are without edema or erythema. No lipo hypertropthy or atrophy  DM foot exam deferred; done in 8/2022    Wt Readings from Last 3 Encounters:   03/02/23 1308 106.9 kg (235 lb 12.5 oz)   02/17/23 1613 106.6 kg (235 lb)   02/16/23 1316 107 kg (236 lb)     Vitals:    03/02/23 1308   BP: 105/60   Pulse: 84           Current Outpatient Medications:     acetaminophen (TYLENOL) 500 MG tablet, Take 500 mg by mouth 2 (two) times daily as needed for Pain., Disp: , Rfl:     alendronate (FOSAMAX) 70 MG tablet, Take 1 tablet (70 mg total) by mouth every 7 days., Disp: 12 tablet, Rfl: 0    amLODIPine (NORVASC) 10 MG tablet, TAKE 1 TABLET(10 MG) BY MOUTH EVERY DAY, Disp: 90 tablet, Rfl: 2    ammonium lactate (LAC-HYDRIN) 12 % lotion, Apply topically as needed for Dry Skin. On the feet and toenails., Disp: 225 g, Rfl: 6    atorvastatin (LIPITOR) 80 MG tablet, TAKE 1 TABLET(80 MG) BY MOUTH EVERY DAY, Disp: 90 tablet, Rfl: 3    BD BOO 2ND GEN PEN NEEDLE 32 gauge x 5/32" Ndle, USE EVERY DAY, Disp: 100 each, Rfl: 8    blood sugar diagnostic Strp, 1 strip by Misc.(Non-Drug; Combo Route) route once daily., Disp: 100 strip, Rfl: 3    carvediloL (COREG) 25 MG tablet, TAKE 1 TABLET(25 MG) BY MOUTH TWICE DAILY WITH MEALS, Disp: 180 tablet, Rfl: 1    clopidogreL (PLAVIX) 75 mg tablet, TAKE 1 TABLET(75 MG) BY MOUTH EVERY DAY, Disp: 90 tablet, Rfl: 3    COD LIVER OIL ORAL, TAKE 2 CAPSULES BY MOUTH EVERY MORNING AND 1 CAPSULE EVERY EVENING, Disp: , Rfl:     diclofenac sodium (VOLTAREN) 1 % Gel, Apply 2 g topically 4 (four) times daily as needed. To painful area on the feet. (Patient taking differently: Apply 2 g topically 4 (four) times daily as needed. To painful area on the feet and knee), Disp: 500 g, Rfl: 5    diphenoxylate-atropine 2.5-0.025 mg (LOMOTIL) 2.5-0.025 mg per tablet, TAKE 1 TABLET BY MOUTH FOUR TIMES DAILY AS NEEDED FOR DIARRHEA, Disp: 30 " tablet, Rfl: 30    dulaglutide (TRULICITY) 4.5 mg/0.5 mL pen injector, Inject 4.5 mg into the skin every 7 days. (Patient taking differently: Inject 4.5 mg into the skin every Sunday.), Disp: 2 mL, Rfl: 3    FEROSUL 325 mg (65 mg iron) Tab tablet, TAKE 1 TABLET BY MOUTH DAILY, Disp: 90 tablet, Rfl: 1    furosemide (LASIX) 20 MG tablet, TAKE 1 TABLET BY MOUTH ON SATURDAY, SUNDAY, MONDAY, WEDNESDAY AND FRIDAY, Disp: 30 tablet, Rfl: 3    glipiZIDE (GLUCOTROL) 5 MG tablet, Take 2 tabs po every morning, 2 tab po every evening. (Patient taking differently: Take 2 tabs po every morning, 1 tab po every evening.), Disp: 90 tablet, Rfl: 5    glucagon (GVOKE HYPOPEN 2-PACK) 1 mg/0.2 mL AtIn, Inject 1 Package into the skin as needed., Disp: 2 Syringe, Rfl: 0    insulin glargine,hum.rec.anlog (BASAGLAR KWIKPEN U-100 INSULIN SUBQ), Inject 22 Units into the skin every evening., Disp: , Rfl:     irbesartan (AVAPRO) 300 MG tablet, TAKE 1 TABLET(300 MG) BY MOUTH EVERY EVENING, Disp: 90 tablet, Rfl: 2    isosorbide mononitrate (IMDUR) 30 MG 24 hr tablet, Take 2 tablets (60 mg total) by mouth once daily., Disp: 180 tablet, Rfl: 3    lancets Misc, 1 lancet by Misc.(Non-Drug; Combo Route) route once daily., Disp: 100 each, Rfl: 3    multivitamin (THERAGRAN) per tablet, Take 1 tablet by mouth once daily., Disp: , Rfl:     nitroGLYCERIN (NITROSTAT) 0.4 MG SL tablet, Place 1 tablet (0.4 mg total) under the tongue every 5 (five) minutes as needed for Chest pain., Disp: 25 tablet, Rfl: 6    vitamin D (VITAMIN D3) 1000 units Tab, Take 1,000 Units by mouth twice a week. Monday AND THURSDAYS ONLY, Disp: , Rfl:     aspirin 81 MG Chew, Take 1 tablet (81 mg total) by mouth once daily., Disp: , Rfl: 0    FLUZONE HIGHDOSE QUAD 22-23  mcg/0.7 mL Syrg, , Disp: , Rfl:     PFIZER COVID BIVAL,12Y UP,,PF, 30 mcg/0.3 mL injection, , Disp: , Rfl:     SHINGRIX, PF, 50 mcg/0.5 mL injection, , Disp: , Rfl:     Current Facility-Administered Medications:      hyaluronate sodium, stabilized (MONOVISC) Syrg, , Intra-articular, 1 time in Clinic/HOD, Peterson Sharma MD    Labs reviewed    Assessment and plan:   1. Type 2 diabetes mellitus with stage 3a chronic kidney disease, without long-term current use of insulin  Comprehensive Metabolic Panel    Hemoglobin A1C    TSH    Microalbumin/Creatinine Ratio, Urine      2. Osteoporosis without current pathological fracture, unspecified osteoporosis type  DXA Bone Density Axial Skeleton 1 or more sites      3. Vitamin D deficiency, unspecified        4. Essential hypertension        5. Dyslipidemia, goal LDL below 70        6. Morbid obesity with body mass index (BMI) of 40.0 or higher              Type 2 diabetes mellitus with stage 3 chronic kidney disease, without long-term current use of insulin  -- Labs prior  -- A1c 7-7.5% without hypoglycemia.   -- Medication Changes:   Discussed her A1c is at goal but I am concerned about hypoglycemia. She declines CGMS at this time due to knee replacement.    Continue   Trulicity 3.0 mg weekly  Glipizide 10 mg in AM and 5 mg in PM  basaglar 22 units daily   Message me for any blood sugar less than 70 and we will adjustments to your regimen. Message me for persistent blood sugars above 180.  -- We will continue novolog for steroid injections. Discussed she will take the novolog before meals with sliding scale below.  With using correction scale:  MDD of:   150-200: +2 units  201-250: +4 units  251-300: +6 units  301-350: +8 units  >350: +10 units    -- Has GVOKE pen.   -- She is fearful of DKA Discussed signs and symptoms of DKA and instructed to buy ketone strips  -- Reviewed goals of therapy are to get the best control we can without hypoglycemia  -- Insulin injection sites and proper rotation instructed.    -- Advised frequent self blood glucose monitoring.  Patient encouraged to document glucose results and bring them to every clinic visit.  -- Hypoglycemia precautions discussed.  Instructed on precautions before driving.    -- Call for Bg repeatedly < 90 or > 180.   -- Close adherence to lifestyle changes recommended.   -- Periodic follow ups for eye evaluations, foot care and dental care suggested. UTD    Osteoporosis without current pathological fracture  -- Continue fosamax weekly  -- Reviewed basics of quantity versus quality  -- Reassuring she is not fracturing   -- RDA of calcium (9001-3375 mg /day in divided doses) and vitamin D 2000iu a day  discussed  -- Calcium from food sources preferred  -- Fall precautions emphasized  -- +weight bearing exercise  -- Repeat DEXA scan in June 2023     Vitamin D deficiency, unspecified  Normal  Continue vit d 1000 twice weekly    Essential hypertension  -- on ARB  -- Controlled  -- Blood pressure goals discussed with patient    Dyslipidemia, goal LDL below 70  -- Controlled   -- On statin per ADA recommendations    Morbid obesity with body mass index (BMI) of 40.0 or higher  Encouraged continued exercise and diet.  Given information on diet    No follow-ups on file.  Labs prior to RTC

## 2023-03-02 ENCOUNTER — CLINICAL SUPPORT (OUTPATIENT)
Dept: REHABILITATION | Facility: HOSPITAL | Age: 78
End: 2023-03-02
Attending: ORTHOPAEDIC SURGERY
Payer: MEDICARE

## 2023-03-02 ENCOUNTER — TELEPHONE (OUTPATIENT)
Dept: ORTHOPEDICS | Facility: CLINIC | Age: 78
End: 2023-03-02
Payer: MEDICARE

## 2023-03-02 ENCOUNTER — OFFICE VISIT (OUTPATIENT)
Dept: ENDOCRINOLOGY | Facility: CLINIC | Age: 78
End: 2023-03-02
Payer: MEDICARE

## 2023-03-02 ENCOUNTER — TELEPHONE (OUTPATIENT)
Dept: REHABILITATION | Facility: HOSPITAL | Age: 78
End: 2023-03-02

## 2023-03-02 VITALS
OXYGEN SATURATION: 96 % | WEIGHT: 235.81 LBS | HEIGHT: 62 IN | SYSTOLIC BLOOD PRESSURE: 105 MMHG | BODY MASS INDEX: 43.39 KG/M2 | DIASTOLIC BLOOD PRESSURE: 60 MMHG | HEART RATE: 84 BPM

## 2023-03-02 DIAGNOSIS — E11.22 TYPE 2 DIABETES MELLITUS WITH STAGE 3A CHRONIC KIDNEY DISEASE, WITHOUT LONG-TERM CURRENT USE OF INSULIN: Chronic | ICD-10-CM

## 2023-03-02 DIAGNOSIS — E78.5 DYSLIPIDEMIA, GOAL LDL BELOW 70: ICD-10-CM

## 2023-03-02 DIAGNOSIS — E66.01 MORBID OBESITY WITH BODY MASS INDEX (BMI) OF 40.0 OR HIGHER: ICD-10-CM

## 2023-03-02 DIAGNOSIS — M25.661 DECREASED RANGE OF MOTION OF RIGHT KNEE: ICD-10-CM

## 2023-03-02 DIAGNOSIS — M81.0 OSTEOPOROSIS WITHOUT CURRENT PATHOLOGICAL FRACTURE, UNSPECIFIED OSTEOPOROSIS TYPE: Chronic | ICD-10-CM

## 2023-03-02 DIAGNOSIS — I10 ESSENTIAL HYPERTENSION: Chronic | ICD-10-CM

## 2023-03-02 DIAGNOSIS — E55.9 VITAMIN D DEFICIENCY, UNSPECIFIED: ICD-10-CM

## 2023-03-02 DIAGNOSIS — N18.31 TYPE 2 DIABETES MELLITUS WITH STAGE 3A CHRONIC KIDNEY DISEASE, WITHOUT LONG-TERM CURRENT USE OF INSULIN: Chronic | ICD-10-CM

## 2023-03-02 DIAGNOSIS — R26.89 GAIT, ANTALGIC: ICD-10-CM

## 2023-03-02 DIAGNOSIS — M17.11 PRIMARY OSTEOARTHRITIS OF RIGHT KNEE: ICD-10-CM

## 2023-03-02 PROCEDURE — 3078F PR MOST RECENT DIASTOLIC BLOOD PRESSURE < 80 MM HG: ICD-10-PCS | Mod: CPTII,S$GLB,, | Performed by: NURSE PRACTITIONER

## 2023-03-02 PROCEDURE — 99214 OFFICE O/P EST MOD 30 MIN: CPT | Mod: S$GLB,,, | Performed by: NURSE PRACTITIONER

## 2023-03-02 PROCEDURE — 3288F FALL RISK ASSESSMENT DOCD: CPT | Mod: CPTII,S$GLB,, | Performed by: NURSE PRACTITIONER

## 2023-03-02 PROCEDURE — 3288F PR FALLS RISK ASSESSMENT DOCUMENTED: ICD-10-PCS | Mod: CPTII,S$GLB,, | Performed by: NURSE PRACTITIONER

## 2023-03-02 PROCEDURE — 3078F DIAST BP <80 MM HG: CPT | Mod: CPTII,S$GLB,, | Performed by: NURSE PRACTITIONER

## 2023-03-02 PROCEDURE — 1160F PR REVIEW ALL MEDS BY PRESCRIBER/CLIN PHARMACIST DOCUMENTED: ICD-10-PCS | Mod: CPTII,S$GLB,, | Performed by: NURSE PRACTITIONER

## 2023-03-02 PROCEDURE — 1159F MED LIST DOCD IN RCRD: CPT | Mod: CPTII,S$GLB,, | Performed by: NURSE PRACTITIONER

## 2023-03-02 PROCEDURE — 1159F PR MEDICATION LIST DOCUMENTED IN MEDICAL RECORD: ICD-10-PCS | Mod: CPTII,S$GLB,, | Performed by: NURSE PRACTITIONER

## 2023-03-02 PROCEDURE — 1160F RVW MEDS BY RX/DR IN RCRD: CPT | Mod: CPTII,S$GLB,, | Performed by: NURSE PRACTITIONER

## 2023-03-02 PROCEDURE — 1100F PTFALLS ASSESS-DOCD GE2>/YR: CPT | Mod: CPTII,S$GLB,, | Performed by: NURSE PRACTITIONER

## 2023-03-02 PROCEDURE — 97162 PT EVAL MOD COMPLEX 30 MIN: CPT | Mod: PN

## 2023-03-02 PROCEDURE — 3072F PR LOW RISK FOR RETINOPATHY: ICD-10-PCS | Mod: CPTII,S$GLB,, | Performed by: NURSE PRACTITIONER

## 2023-03-02 PROCEDURE — 99999 PR PBB SHADOW E&M-EST. PATIENT-LVL III: CPT | Mod: PBBFAC,,, | Performed by: NURSE PRACTITIONER

## 2023-03-02 PROCEDURE — 99214 PR OFFICE/OUTPT VISIT, EST, LEVL IV, 30-39 MIN: ICD-10-PCS | Mod: S$GLB,,, | Performed by: NURSE PRACTITIONER

## 2023-03-02 PROCEDURE — 1100F PR PT FALLS ASSESS DOC 2+ FALLS/FALL W/INJURY/YR: ICD-10-PCS | Mod: CPTII,S$GLB,, | Performed by: NURSE PRACTITIONER

## 2023-03-02 PROCEDURE — 3074F SYST BP LT 130 MM HG: CPT | Mod: CPTII,S$GLB,, | Performed by: NURSE PRACTITIONER

## 2023-03-02 PROCEDURE — 1125F PR PAIN SEVERITY QUANTIFIED, PAIN PRESENT: ICD-10-PCS | Mod: CPTII,S$GLB,, | Performed by: NURSE PRACTITIONER

## 2023-03-02 PROCEDURE — 3072F LOW RISK FOR RETINOPATHY: CPT | Mod: CPTII,S$GLB,, | Performed by: NURSE PRACTITIONER

## 2023-03-02 PROCEDURE — 3074F PR MOST RECENT SYSTOLIC BLOOD PRESSURE < 130 MM HG: ICD-10-PCS | Mod: CPTII,S$GLB,, | Performed by: NURSE PRACTITIONER

## 2023-03-02 PROCEDURE — 97110 THERAPEUTIC EXERCISES: CPT | Mod: PN

## 2023-03-02 PROCEDURE — 99999 PR PBB SHADOW E&M-EST. PATIENT-LVL III: ICD-10-PCS | Mod: PBBFAC,,, | Performed by: NURSE PRACTITIONER

## 2023-03-02 PROCEDURE — 1125F AMNT PAIN NOTED PAIN PRSNT: CPT | Mod: CPTII,S$GLB,, | Performed by: NURSE PRACTITIONER

## 2023-03-02 RX ORDER — INFLUENZA A VIRUS A/VICTORIA/2570/2019 IVR-215 (H1N1) ANTIGEN (FORMALDEHYDE INACTIVATED), INFLUENZA A VIRUS A/DARWIN/9/2021 SAN-010 (H3N2) ANTIGEN (FORMALDEHYDE INACTIVATED), INFLUENZA B VIRUS B/PHUKET/3073/2013 ANTIGEN (FORMALDEHYDE INACTIVATED), AND INFLUENZA B VIRUS B/MICHIGAN/01/2021 ANTIGEN (FORMALDEHYDE INACTIVATED) 60; 60; 60; 60 UG/.7ML; UG/.7ML; UG/.7ML; UG/.7ML
INJECTION, SUSPENSION INTRAMUSCULAR
COMMUNITY
Start: 2022-12-16 | End: 2023-08-21 | Stop reason: ALTCHOICE

## 2023-03-02 RX ORDER — ZOSTER VACCINE RECOMBINANT, ADJUVANTED 50 MCG/0.5
KIT INTRAMUSCULAR
COMMUNITY
Start: 2022-11-29 | End: 2023-08-21 | Stop reason: ALTCHOICE

## 2023-03-02 RX ORDER — BNT162B2 ORIGINAL AND OMICRON BA.4/BA.5 .1125; .1125 MG/2.25ML; MG/2.25ML
INJECTION, SUSPENSION INTRAMUSCULAR
COMMUNITY
Start: 2022-11-29 | End: 2023-08-21 | Stop reason: ALTCHOICE

## 2023-03-02 NOTE — PROGRESS NOTES
"[facility]   Physical Therapy Initial Evaluation     Date: 3/2/2023   Name: Kaylee Romero  Clinic Number: [id number]    Therapy Diagnosis: No diagnosis found.  Physician: Peterson Sharma MD    Physician Orders: PT Eval and Treat   Medical Diagnosis from Referral: M17.11 (ICD-10-CM) - Primary osteoarthritis of right knee  Evaluation Date: 3/2/2023  Authorization Period Expiration: 3/30/2023  Plan of Care Expiration: ***  Progress Note Due: 3/2/2023  Visit # / Visits authorized: 1/ 1   Remaining Visits Scheduled - 00  PTA Consecutive Visits - 0/6    Eval Visit FOTO - *** (3/2/2023)   5th Visit FOTO  - (Date/Score/Turn Green)   10th Visit FOTO - (Date/Score/Turn Green)   D/C FOTO -  (Date/Score/Turn Green)      Precautions: Standard, Diabetes, and Fall     Time In: ***  Time Out: ***  Total Appointment Time (timed & untimed codes): *** minutes    SUBJECTIVE     Date of onset: ***    Current Condition - Kaylee reports to therapy due to Right knee pain, secondary to osteoarthritis. Ms. Romero is to have a Right Total Knee Arthroplasty performed on 3/9/2023.    Type of Pain: Pain is located to the *** and described as {Pain Description:14115}  24-Hour Pattern: ***  Sleep: Pt is able to sleep for *** before pain wakes them up.    Aggravating Factors: {Causes; Pain:87855}  Easing Factors: {Pain (activities that relieve):56077}    Imaging: X-ray: Per imaging report, "Severe medial compartment tibiofemoral joint space narrowing is observed, with tibiofemoral spurring and patellar spurring again noted as well.  No evidence of recent or healing fracture or lytic destructive process.  No knee joint effusion is seen."    Prior Therapy: ***  Work/School: ***  Hobbies/Exercise: ***  Physical Environment: Pt lives in a ***-story home with *** steps to enter the home and {LIVES WITH:80375}  Hand Dominance: {RIGHT/LEFT:20294}    Prior Level of Function: ***  Current Level of Function: " "***    Pain:  Current {0-10:02278::"0"}/10,   Worst {0-10:16489::"0"}/10,   Best {0-10:14686::"0"}/10     Non-Mechanical Origin:  Night Pain?  ***  Hx of Cancer?  ***  Trauma?  ***  Sudden bowel/bladder changes?  ***  Bone Density compromise?  ***    Falls: ***  Red Flags: ***     Patient Goals: ***     Medical History:   Past Medical History:   Diagnosis Date    Age-related osteoporosis without current pathological fracture 2019    Anemia     Cataract     CKD (chronic kidney disease) stage 3, GFR 30-59 ml/min     Coronary artery disease     Dry mouth     History of TIA (transient ischemic attack) 2018    Hyperlipidemia 2014    Hypertension     Hypertensive heart disease with diastolic heart failure 2012    Morbid obesity with body mass index (BMI) of 40.0 or higher 2016    KAMRAN (obstructive sleep apnea)     KAMRAN on CPAP 2016    Osteoporosis without current pathological fracture 2018    Physical deconditioning 2018    Status post total left knee replacement 2017    Type 2 diabetes mellitus with stage 3 chronic kidney disease, without long-term current use of insulin 2019       Surgical History:   Kaylee Romero  has a past surgical history that includes  section; ankle surgery (l) (Left); Appendectomy; Knee arthroscopy w/ debridement; Dilation and curettage of uterus; Knee surgery (Left, 2017); Left heart catheterization (Left, 2019); Coronary Artery Bypass Graft (CABG) (N/A, 2019); Endoscopic harvest of vein (Left, 2019); Coronary artery bypass graft (2019); Left heart catheterization (Left, 10/18/2021); Coronary bypass graft angiography (10/18/2021); and Joint replacement (Left).    Medications:   Kaylee has a current medication list which includes the following prescription(s): acetaminophen, alendronate, amlodipine, ammonium lactate, aspirin, atorvastatin, bd lisbeth 2nd gen pen needle, blood sugar diagnostic, " carvedilol, clopidogrel, cod liver oil, diclofenac sodium, diphenoxylate-atropine 2.5-0.025 mg, trulicity, ferosul, furosemide, glipizide, gvoke hypopen 2-pack, insulin glargine,hum.rec.anlog, irbesartan, isosorbide mononitrate, lancets, multivitamin, nitroglycerin, and vitamin d, and the following Facility-Administered Medications: hyaluronate sodium, stabilized.    Allergies:   Review of patient's allergies indicates:   Allergen Reactions    Bactrim [sulfamethoxazole-trimethoprim] Other (See Comments)     Generic version  Sulfa makes her sick    Keflex [cephalexin] Other (See Comments)     Turns orange      OBJECTIVE     BALANCE - Sharpened Romberg    Left Right    Eyes Open  *** sec *** sec   Eyes Closed  *** sec *** sec     LOWER EXTREMITY AROM    Left Right    Knee Flexion ***° ***°   Knee Extension ***° ***°   LOWER EXTREMITY PROM   Knee Flexion ***° ***°   Knee Extension ***° ***°     LOWER EXTREMITY STRENGTH    Left  Right    Knee Extension {AMB PT VESTIBULAR STRENGTH:52577} {AMB PT VESTIBULAR STRENGTH:92591}   Knee Flexion {AMB PT VESTIBULAR STRENGTH:28932} {AMB PT VESTIBULAR STRENGTH:28755}   Hip Flexion {AMB PT VESTIBULAR STRENGTH:69684} {AMB PT VESTIBULAR STRENGTH:63890}   Hip External Rotation  {AMB PT VESTIBULAR STRENGTH:59081} {AMB PT VESTIBULAR STRENGTH:13927}   Hip Internal Rotation  {AMB PT VESTIBULAR STRENGTH:65412} {AMB PT VESTIBULAR STRENGTH:59756}   Hip Abduction  {AMB PT VESTIBULAR STRENGTH:22199} {AMB PT VESTIBULAR STRENGTH:88997}   Hip Extension  {AMB PT VESTIBULAR STRENGTH:98152} {AMB PT VESTIBULAR STRENGTH:23356}   Heel Raise *** reps *** reps   Ankle Dorsiflexion {AMB PT VESTIBULAR STRENGTH:09101} {AMB PT VESTIBULAR STRENGTH:69326}     FLEXIBILITY   Hamstring  L (-***°) R (-***°)   Prone Quadriceps L (-***°) R (-***°)   Piriformis L (-***°) R (-***°)       PALPATION:  Patient reports primary pain region along ****    STAIRS: ***    PATELLAR TRACKING:     Left Right    Superior Glide ***  "***   Inferior [place] *** ***   Medial Glide *** ***   Lateral [place] *** ***       GAIT: Kaylee ambulates with {AMB PT VESTIBULAR ASSISTIVE:92214} with {AMB PT VESTIBULAR SUPERVISION:79124}.     GAIT DEVIATIONS: Kaylee displays {AMB PT VESTIBULAR GAIT DEVIATIONS:52087}    Limitation/Restriction for FOTO LEFS Survey    Therapist reviewed FOTO scores for Kaylee Romero on 3/2/2023.   FOTO documents entered into [employer] - see Media section.    Limitation Score: ***/80       TREATMENT     Total Treatment time (time-based codes) separate from Evaluation: 25 minutes      Kaylee received the treatments listed below:      therapeutic exercises to develop {AMB PT PROGRESS OBJECTIVE:97150} for 15 minutes including:  ***    manual therapy techniques: {AMB PT PROGRESS MANUAL THERAPY:74438} were applied to the: *** for *** minutes, including:  ***    neuromuscular re-education activities to improve: {AMB PT PROGRESS NEURO RE-ED:60277} for 10 minutes. The following activities were included:  ***    therapeutic activities to improve functional performance for ***  minutes, including:  ***    gait training to improve functional mobility and safety for ***  minutes, including:  ***    direct contact modalities after being cleared for contraindications: {AMB PT PROGRESS DIRECT CONTACT MODES:66735}    supervised modalities after being cleared for contradictions: {AMB PT SUPERVISED MODES:55847}    hot pack for *** minutes to ***.    cold pack for *** minutes to ***.      PATIENT EDUCATION AND HOME EXERCISES     Education provided:   - Pt was educated on the prognosis and pathology of ***. The patient verbalized understanding and compliance with HEP.   - Pt/family was provided educational information, including: role of PT, goals for PT, scheduling - pt verbalized understanding. Discussed insurance limitations with pt.     Written Home Exercises Provided: {Blank single:68462::"yes","Patient instructed to cont prior HEP"}. Exercises were " reviewed and Kaylee was able to demonstrate them prior to the end of the session.  Kaylee demonstrated {Desc; good/fair/poor:91913} understanding of the education provided. See EMR under Patient Instructions for exercises provided during therapy sessions.    ASSESSMENT     Kaylee is a 78 y.o. female referred to outpatient Physical Therapy with a medical diagnosis of Primary osteoarthritis of right knee. Findings are consistent with *** to the *** (body region). Limitations are noted with ***. Primary impairment is pain, secondary to decreased ROM, endurance, strength, coordination, stability/balance, muscular activation, and function to the *** (body region). Pt. would benefit from PT to address impairments listed above to return to functional independence.    Intervention strategies designed to address these impairments will be instrumental in achieving the stated functional goals, including her ability to safely perform mobility tasks. Based on the examination, the patient's rehabilitation potential to achieve functional goals is ***.     Patient prognosis is {REHAB PROGNOSIS OHS:56172}.   Patient will benefit from skilled outpatient Physical Therapy to address the deficits stated above and in the chart below, provide patient /family education, and to maximize patientt's level of independence.     Plan of care discussed with patient: {YES:11387}  Patient's spiritual, cultural and educational needs considered and patient is agreeable to the plan of care and goals as stated below:     Anticipated Barriers for therapy: ***    Medical Necessity is demonstrated by the following  History  Co-morbidities and personal factors that may impact the plan of care Co-morbidities:   {Co-morbidities:53871}    Personal Factors:   {Personal Factors:12475}     {Desc; low/moderate/high:016682}   Examination  Body Structures and Functions, activity limitations and participation restrictions that may impact the plan of care Body Regions:  "  {Body Regions:53698}    Body Systems:    {Body Systems:08808}    Participation Restrictions:   ***    Activity limitations:   Learning and applying knowledge  {Learning and applying knowledge:96336}    General Tasks and Commands  {Gen tasks and commands:48507}    Communication  {Communication:47883}    Mobility  {Mobility:58803}    Self care  {Self Care:47274}    Domestic Life  {Domestic Life:45750}    Interactions/Relationships  {Interactions/Relationships:77822}    Life Areas  {Life Areas:52892}    Community and Social Life  {Community/Social Life:63153}         {Desc; low/moderate/high:793800}   Clinical Presentation {Clinical Presentation :76606} {Desc; low/moderate/high:406218}   Decision Making/ Complexity Score: {Desc; low/moderate/high:427379}     Goals:  Short Term Goals: *** weeks   Patient will be independent with HEP at home to increase PT compliance.                   Long Term Goals: *** weeks   Patient will decrease LEFS score to > ***/80 to increase functional capacity.                     PLAN   Plan of care Certification: 3/2/2023 to ***.    Outpatient Physical Therapy {NUMBERS 1-5:32108} times weekly for {0-10:37746::"0"} weeks to include the following interventions: {TX PLAN:85106}.     Jace Rousseau, PT, DPT    Pt may be seen by PTA as part of the rehabilitation team.     Therapist: Jace Rousseau, PT, DPT 3/2/2023    I CERTIFY THE NEED FOR THESE SERVICES FURNISHED UNDER THIS PLAN OF TREATMENT AND WHILE UNDER MY CARE   Physician's comments:     Physician's Signature: ___________________________________________________             "

## 2023-03-02 NOTE — ASSESSMENT & PLAN NOTE
-- Labs prior  -- A1c 7-7.5% without hypoglycemia.   -- Medication Changes:   Discussed her A1c is at goal but I am concerned about hypoglycemia. She declines CGMS at this time due to knee replacement.    Continue   Trulicity 3.0 mg weekly  Glipizide 10 mg in AM and 5 mg in PM  basaglar 22 units daily   Message me for any blood sugar less than 70 and we will adjustments to your regimen. Message me for persistent blood sugars above 180.  -- We will continue novolog for steroid injections. Discussed she will take the novolog before meals with sliding scale below.  With using correction scale:  MDD of:   150-200: +2 units  201-250: +4 units  251-300: +6 units  301-350: +8 units  >350: +10 units    -- Has GVOKE pen.   -- She is fearful of DKA Discussed signs and symptoms of DKA and instructed to buy ketone strips  -- Reviewed goals of therapy are to get the best control we can without hypoglycemia  -- Insulin injection sites and proper rotation instructed.    -- Advised frequent self blood glucose monitoring.  Patient encouraged to document glucose results and bring them to every clinic visit.  -- Hypoglycemia precautions discussed. Instructed on precautions before driving.    -- Call for Bg repeatedly < 90 or > 180.   -- Close adherence to lifestyle changes recommended.   -- Periodic follow ups for eye evaluations, foot care and dental care suggested. UTD

## 2023-03-02 NOTE — TELEPHONE ENCOUNTER
Patient aware spoke with Dr Sharma's PA who states that she'll get IV antibx prior to surgery. Patient verbalized understanding.    ----- Message from Frannie Chavez CMA sent at 3/2/2023 10:33 AM CST -----  Regarding: Please call  Contact: [phone]  Pt states she has a procedure scheduled on 3/9/23, she states she will need antibiotics. Please call pt to advise.     Thank you

## 2023-03-02 NOTE — TELEPHONE ENCOUNTER
Patient was left a voicemail with the following message.    Dear Kaylee Romero,      I saw that you missed your appointment today (03/02/2023)  and just wanted to reach out to you to make sure you were okay. To reschedule your evaluation please give [facility] location a call at [phone].        Thank you,   Jace Rousseau, PT, DPT

## 2023-03-02 NOTE — ASSESSMENT & PLAN NOTE
-- Continue fosamax weekly  -- Reviewed basics of quantity versus quality  -- Reassuring she is not fracturing   -- RDA of calcium (8180-0916 mg /day in divided doses) and vitamin D 2000iu a day  discussed  -- Calcium from food sources preferred  -- Fall precautions emphasized  -- +weight bearing exercise  -- Repeat DEXA scan in June 2023

## 2023-03-02 NOTE — PROGRESS NOTES
[facility]  Physical Therapy Initial Evaluation - Knee    Name: Kaylee Romero  Clinic Number: [id number]    Therapy Diagnosis:   Encounter Diagnosis   Name Primary?    Primary osteoarthritis of right knee      Physician: Peterson Sharma MD    Physician Orders: PT Eval and Treat   Medical Diagnosis from Referral: M17.11 (ICD-10-CM) - Primary osteoarthritis of right knee  Evaluation Date: 3/2/2023  Plan of Care Expiration: 5/25/23 or 24th Visit  Authorization Period Expiration: 3/30/23  Visit # / Visits authorized: 1/ 1  Remaining Visits Scheduled - 00  Please see Plan of Care notation section to view Kaylee Romero Initial Evaluation.       Maxwell Brewer, PT,DPT

## 2023-03-02 NOTE — PATIENT INSTRUCTIONS
Diabetes  Discussed her A1c is at goal but I am concerned about hypoglycemia. She declines CGMS at this time due to knee replacement.    Continue   Trulicity 3.0 mg weekly  Glipizide 10 mg in AM and 5 mg in PM  basaglar 22 units daily      Osteoporosis               Continue Vitamin D 2000 IU twice weekly               RDA of calcium is 2481-2016 mg daily, preferable from food               Avoid alcohol and tobacco and falls              Continue fosamax              Check bone density in June 2023                  Snacks can be an important part of a balanced, healthy meal plan. They allow you to eat more frequently, feeling full and satisfied throughout the day. Also, they allow you to spread carbohydrates evenly, which may stabilize blood sugars.  Plus, snacks are enjoyable!      The amount of carbohydrate needed at snacks varies. Generally, about 15-30 grams of carbohydrate per snack is recommended.  Below you will find some tasty treats.        0-5 gm carb  Crystal Light  Vitamin Water Zero  Herbal tea, unsweetened  2 tsp peanut butter on celery  1./2 cup sugar-free jell-o  1 sugar-free popsicle  ¼ cup blueberries  8oz Blue Jennifer unsweetened almond milk  5 baby carrots & celery sticks, cucumbers, bell peppers dipped in ¼ cup salsa, 2Tbsp light ranch dressing or 2Tbsp plain Greek yogurt  10 Goldfish crackers  ½ oz low-fat cheese or string cheese  1 closed handful of nuts, unsalted  1 Tbsp of sunflower seeds, unsalted  1 cup Smart Pop popcorn  1 whole grain brown rice cake          15 gm carb  1 small piece of fruit or ½ banana or 1/2 cup lite canned fruit  3 jesse cracker squares  3 cups Smart Pop popcorn, top spray butter, [place] lite salt or cinnamon and Truvia  5 Vanilla Wafers  ½ cup low fat, no added sugar ice cream or frozen yogurt (Blue bell, Blue Bunny, Weight Watchers, Skinny Cow)  ½ turkey, ham, or chicken sandwich  ½ c fruit with ½ c Cottage cheese  4-6 unsalted wheat crackers with 1 oz low fat  cheese or 1 tbsp peanut butter   30-45 goldfish crackers (depending on flavor)   7-8 [place] mini brown rice cakes (caramel, apple cinnamon, chocolate)   12 [place] mini brown rice cakes (cheddar, bbq, ranch)   1/3 cup hummus dip with raw veg  1/2 whole wheat yudy, 1Tbsp hummus  Mini Pizza (1/2 whole wheat English muffin, low-fat  cheese, tomato sauce)  100 calorie snack pack (Oreo, Chips Ahoy, Ritz Mix, Baked Cheetos)  4-6 oz. light or Greek Style yogurt (Chobani, Yoplait, Okios, Stoneyfield)  ½ cup sugar-free pudding    6 in. wheat tortilla or yudy oven toasted chips (topped with spray butter flavoring, cinnamon, Truvia OR spray butter, garlic powder, chili powder)   18 BBQ Popchips (available at [employer], [employer] Foods, Fresh Market)                      Snacks can be an important part of a balanced, healthy meal plan. They allow you to eat more frequently, feeling full and satisfied throughout the day. Also, they allow you to spread carbohydrates evenly, which may stabilize blood sugars.  Plus, snacks are enjoyable!      The amount of carbohydrate needed at snacks varies. Generally, about 15-30 grams of carbohydrate per snack is recommended.  Below you will find some tasty treats.        0-5 gm carb  Crystal Light  Vitamin Water Zero  Herbal tea, unsweetened  2 tsp peanut butter on celery  1./2 cup sugar-free jell-o  1 sugar-free popsicle  ¼ cup blueberries  8oz Blue Jennifer unsweetened almond milk  5 baby carrots & celery sticks, cucumbers, bell peppers dipped in ¼ cup salsa, 2Tbsp light ranch dressing or 2Tbsp plain Greek yogurt  10 Goldfish crackers  ½ oz low-fat cheese or string cheese  1 closed handful of nuts, unsalted  1 Tbsp of sunflower seeds, unsalted  1 cup Smart Pop popcorn  1 whole grain brown rice cake          15 gm carb  1 small piece of fruit or ½ banana or 1/2 cup lite canned fruit  3 jesse cracker squares  3 cups Smart Pop popcorn, top spray butter, [place] lite salt or cinnamon and Truvia  5  Vanilla Wafers  ½ cup low fat, no added sugar ice cream or frozen yogurt (Blue bell, Blue Bunny, Weight Watchers, Skinny Cow)  ½ turkey, ham, or chicken sandwich  ½ c fruit with ½ c Cottage cheese  4-6 unsalted wheat crackers with 1 oz low fat cheese or 1 tbsp peanut butter   30-45 goldfish crackers (depending on flavor)   7-8 [place] mini brown rice cakes (caramel, apple cinnamon, chocolate)   12 [place] mini brown rice cakes (cheddar, bbq, ranch)   1/3 cup hummus dip with raw veg  1/2 whole wheat yudy, 1Tbsp hummus  Mini Pizza (1/2 whole wheat English muffin, low-fat  cheese, tomato sauce)  100 calorie snack pack (Oreo, Chips Ahoy, Ritz Mix, Baked Cheetos)  4-6 oz. light or Greek Style yogurt (Chobani, Yoplait, Okios, Stoneyfield)  ½ cup sugar-free pudding    6 in. wheat tortilla or yudy oven toasted chips (topped with spray butter flavoring, cinnamon, Truvia OR spray butter, garlic powder, chili powder)   18 BBQ Popchips (available at [employer], Whole Foods, Fresh Market)

## 2023-03-03 PROBLEM — R26.89 ANTALGIC GAIT: Status: RESOLVED | Noted: 2021-07-16 | Resolved: 2023-03-03

## 2023-03-03 PROBLEM — R26.89 GAIT, ANTALGIC: Status: ACTIVE | Noted: 2023-03-03

## 2023-03-03 PROBLEM — M25.661 DECREASED RANGE OF MOTION OF RIGHT KNEE: Status: ACTIVE | Noted: 2023-03-03

## 2023-03-03 PROBLEM — G89.29 CHRONIC PAIN OF RIGHT KNEE: Status: RESOLVED | Noted: 2021-07-16 | Resolved: 2023-03-03

## 2023-03-03 PROBLEM — M25.662 DECREASED RANGE OF MOTION OF LEFT KNEE: Status: RESOLVED | Noted: 2021-07-16 | Resolved: 2023-03-03

## 2023-03-03 PROBLEM — M25.561 CHRONIC PAIN OF RIGHT KNEE: Status: RESOLVED | Noted: 2021-07-16 | Resolved: 2023-03-03

## 2023-03-03 NOTE — PLAN OF CARE
[facility]  Physical Therapy Initial Evaluation - Knee    Name: Kaylee Romero  Clinic Number: [id number]    Therapy Diagnosis:   Encounter Diagnosis   Name Primary?    Primary osteoarthritis of right knee      Physician: Peterson Sharma MD    Physician Orders: PT Eval and Treat   Medical Diagnosis from Referral: M17.11 (ICD-10-CM) - Primary osteoarthritis of right knee  Evaluation Date: 3/2/2023  Plan of Care Expiration: 5/25/23 or 24th Visit  Authorization Period Expiration: 3/30/23  Visit # / Visits authorized: 1/ 1  Remaining Visits Scheduled - 00    Eval Visit FOTO-  (Date/Score/Turn Green)   5th Visit FOTO   -  (Date/Score/Turn Green)   10th Visit FOTO  -  (Date/Score/Turn Green)   D/C FOTO          -  (Date/Score/Turn Green)     Time In: 0315  Time Out: 0400  Total Billable Time: 45 minutes    Precautions: Standard and Fall    Subjective     History of current condition - Kaylee is a 78 y.o. year old female who presents to the [facility] PT clinic  with complaint of right knee pain with schedule TKA for 3/9/23. Pt report onset of the symptoms occurred  +5 years prior to evaluation. Precipitating event:Insidious Onset . Current symptoms include: knee pain with swelling. Aggravating factors: ambulation and weight bearing .  Evaluation to date interventions: Orthopedics . Treatment to date: Prior Physical Therapy . Patients presently rates pain 6/10 on pain scale.    Mechanism of Injury: insidious onset   Next MD Visit: 3/9/23     Medical History:   Past Medical History:   Diagnosis Date    Age-related osteoporosis without current pathological fracture 01/11/2019    Anemia     Cataract     CKD (chronic kidney disease) stage 3, GFR 30-59 ml/min     Coronary artery disease     Dry mouth     History of TIA (transient ischemic attack) 12/11/2018    Hyperlipidemia 12/02/2014    Hypertension     Hypertensive heart disease with diastolic heart failure 11/28/2012    Morbid obesity with body  "mass index (BMI) of 40.0 or higher 2016    KAMRAN (obstructive sleep apnea)     KAMRAN on CPAP 2016    Osteoporosis without current pathological fracture 2018    Physical deconditioning 2018    Status post total left knee replacement 2017    Type 2 diabetes mellitus with stage 3 chronic kidney disease, without long-term current use of insulin 2019       Surgical History:   Kaylee Romero  has a past surgical history that includes  section; ankle surgery (l) (Left); Appendectomy; Knee arthroscopy w/ debridement; Dilation and curettage of uterus; Knee surgery (Left, 2017); Left heart catheterization (Left, 2019); Coronary Artery Bypass Graft (CABG) (N/A, 2019); Endoscopic harvest of vein (Left, 2019); Coronary artery bypass graft (2019); Left heart catheterization (Left, 10/18/2021); Coronary bypass graft angiography (10/18/2021); and Joint replacement (Left).    Medications:   Kaylee has a current medication list which includes the following prescription(s): acetaminophen, alendronate, amlodipine, ammonium lactate, aspirin, atorvastatin, bd lisbeth 2nd gen pen needle, blood sugar diagnostic, carvedilol, clopidogrel, cod liver oil, diclofenac sodium, diphenoxylate-atropine 2.5-0.025 mg, trulicity, ferosul, fluzone highdose quad 22-23 pf, furosemide, glipizide, gvoke hypopen 2-pack, insulin glargine,hum.rec.anlog, irbesartan, isosorbide mononitrate, lancets, multivitamin, nitroglycerin, pfizer covid bival(12y up)(pf), shingrix (pf), and vitamin d, and the following Facility-Administered Medications: hyaluronate sodium, stabilized.    Allergies:   Review of patient's allergies indicates:   Allergen Reactions    Bactrim [sulfamethoxazole-trimethoprim] Other (See Comments)     Generic version  Sulfa makes her sick    Keflex [cephalexin] Other (See Comments)     Turns orange        Imaging:X-ray   Per radiology report "   Degenerative arthritic " "changes involving the right knee, with preferential involvement of the medial tibiofemoral compartment.  Findings are quite similar to 01/31/2023.   "    Prior Therapy: No prior therapy received for current condition   Social History: Kaylee lives in a single story home with 4 steps to enter and  lives with their family  Occupation: Retired  Assistive Devices Owned: [employer] cane   Hobbies/Exercise: none   Hand Dominance: right  Prior Level of Function: Modified Independent  Current Level of Function: Modified Independent secondary to right knee pain     Pain:  Current 6/10, worst 10/10 (walking  increases pain), best 0/10 (rest/sleep reduces pain)  Location: right knee   Description: "it hurts!"  Aggravating Factors: Standing and Walking  Easing Factors: rest    Pts goals: outside ambulation     Objective     Posture: Poor - forward head   Palpation:  multiple trigger point tenderness  Sensation: intact to light touch  DTRs: 2+    Range of Motion/Strength:     Knee Left Right Pain/Dysfunction with Movement   AROM      Knee Flexion (140)  105 °   80 °     Knee Extension (0)  0 °   -8 °           RLE 5/5 4+/5 4/5 4-/5 3+/5 3/5 3-/5 2+/5 2/5 2-/5 1/5 0 NT   Hip Flexion     x                Hip Extension     x                Hip Abduction     x                Hip Adduction     x                Hip Internal Rotation     x                Hip External Rotation     x                Knee Flexion     x                Knee Extension     x                Ankle Dorsiflexion   x                  Ankle Plantarflexion   x                     LLE 5/5 4+/5 4/5 4-/5 3+/5 3/5 3-/5 2+/5 2/5 2-/5 1/5 0 NT   Hip Flexion    x                      Hip Extension    x                      Hip Abduction    x                      Hip Adduction    x                      Hip Internal Rotation    x                      Hip External Rotation    x                      Knee Flexion    x                      Knee Extension    x                    "   Ankle Dorsiflexion    x                      Ankle Plantarflexion    x                             Special Tests Left Right Comments   Single Leg Stance 5 sec 2 sec    Flexibility       90/90 Hamstrings  -25 ° -20 °    Girth Measurements       At  MJL  45.5 cm  44.5 cm     Joint   Mobility   (grading 0-6 out of 6)       Superior Patella glide 3 2    Inferior Patella glide 3 2    Lateral Patella glide 3 2    Medial Patella glide  3 2      Gait Analysis   Kaylee ambulated 120 feet with straight cane device with independence assistance. Kaylee displays lack of stride length and increased lateral shifts  gait deviation during 1 gait cycle.      Other:   Sit to Stand - 2 Reps. Completed in 30 sec.  TUG -  >14 Sec. To complete 10ft. (> 14sec. Considered a fall risk)           CMS Impairment/Limitation/Restriction for FOTO Knee Survey    Therapist reviewed FOTO scores for Kaylee Romero on 3/2/2023.   FOTO documents entered into EPIC - see Media section.    Limitation Score: See scanned media  Category: Mobility           TREATMENT   Treatment Time In: 335  Treatment Time Out: 345  Total Treatment time separate from Evaluation: 10 minutes    Kaylee received therapeutic exercises to develop strength, ROM, and flexibility for 10 minutes including:  Warm-up      Supine    Heel slides right  LE with strap - 30 reps   Quad Sets with towel right LE - 30 reps with 5 sec. Hold   SLR rightLE  - 30 reps   Heel slides with and without strap - 30 reps   Hip Abduction with green TB and Adduction - 30 reps   Prone HS curl with right LE - 30 reps   Patella Mob - 1' in each direction    Seated        Standing            Home Exercises and Patient Education Provided    Education provided:   Patient was provided educational information regarding: role of Physical Therapy, short and long term goals, patient/therapist expectations, scheduling, and attendance policy.    Written Home Exercises Provided: yes  Exercises were reviewed and Kaylee  was able to demonstrate them prior to the end of the session.  Kaylee demonstrated fair  understanding of the education provided.     See EMR under: Patient Instructions for exercises provided 3/2/2023.    Assessment     Kaylee is a 78 y.o. female referred to outpatient Physical Therapy with a medical diagnosis of Primary osteoarthritis of right knee. Pt presents with displays of weakness, impaired endurance, impaired functional mobility, gait instability, decreased lower extremity function, pain, decreased ROM, and edema.     Pt prognosis is Good.   Pt will benefit from skilled outpatient Physical Therapy to address the deficits stated above and in the chart below, provide pt/family education, and to maximize pt's level of independence.     Plan of care discussed with patient: Yes  Pt's spiritual, cultural and educational needs considered and patient is agreeable to the plan of care and goals as stated below:     Anticipated Barriers for therapy: PMH    Medical Necessity is demonstrated by the following  History  Co-morbidities and personal factors that may impact the plan of care Co-morbidities:       Past Medical History:   Diagnosis Date    Age-related osteoporosis without current pathological fracture 01/11/2019    Anemia     Cataract     CKD (chronic kidney disease) stage 3, GFR 30-59 ml/min     Coronary artery disease     Dry mouth     History of TIA (transient ischemic attack) 12/11/2018    Hyperlipidemia 12/02/2014    Hypertension     Hypertensive heart disease with diastolic heart failure 11/28/2012    Morbid obesity with body mass index (BMI) of 40.0 or higher 05/09/2016    KAMRAN (obstructive sleep apnea)     KAMRAN on CPAP 06/28/2016    Osteoporosis without current pathological fracture 11/11/2018    Physical deconditioning 11/05/2018    Status post total left knee replacement 5/1/2017 05/16/2017    Type 2 diabetes mellitus with stage 3 chronic kidney disease, without long-term current use of insulin 05/16/2019        Personal Factors:   no deficits     moderate   Examination  Body Structures and Functions, activity limitations and participation restrictions that may impact the plan of care Body Regions:   lower extremities    Body Systems:    ROM  strength  balance  gait    Participation Restrictions:   none    Activity limitations:   Learning and applying knowledge  no deficits    General Tasks and Commands  no deficits    Communication  no deficits    Mobility  walking    Self care  no deficits    Domestic Life  doing house work (cleaning house, washing dishes, laundry)    Interactions/Relationships  no deficits    Life Areas  no deficits    Community and Social Life  no deficits         moderate   Clinical Presentation evolving clinical presentation with changing clinical characteristics moderate   Decision Making/ Complexity Score: moderate     Goals:    Short Term Goals: 6 weeks  Goal   Status    Pt. to report decreased right knee pain  </= 5/10 at worst to increase tolerance for prolong standing     Pt. to demonstrate proper gait mechanics requiring minimum verbal cues from PT    Pt. to demonstrate an increase right knee AROM to 95 degrees to improve ease with stair negotiation.    Pt to be Independent with HEP to improve ROM and independence with ADL's        Long Term Goals: 12 weeks  Goal Status    Pt. to report decreased right knee pain  </= 2/10 at worst to increase tolerance for prolong standing     Pt. to demonstrate proper gait mechanics requiring no verbal cues from PT    Pt. to demonstrate an increase right knee AROM to >110 degrees to improve ease with stair negotiation.    Pt. to demonstrate increased MMT for right hip abductors to 4/5  to increase stability with ambulation on even surfaces.    Pt. to demonstrate increased MMT for right quadriceps to 4/5 to increase ability to negotiate stairs    Pt. to demonstrate increased MMT for right4 hamstring  to 4/5 to increase tolerance to squatting    Pt to be  Independent with HEP to improve ROM and independence with ADL's          Plan   Plan of care Certification: 3/2/2023 to5/25/2023 (12).    Outpatient Physical Therapy 1-3 times weekly for 12 weeks to include the following interventions: Gait Training, Manual Therapy, Neuromuscular Re-ed, Patient Education, Therapeutic Activities, Therapeutic Exercise, Ultrasound, and Integrative Dry Needling .     Maxwell Brewer, PT,DPT

## 2023-03-07 ENCOUNTER — TELEPHONE (OUTPATIENT)
Dept: ORTHOPEDICS | Facility: CLINIC | Age: 78
End: 2023-03-07
Payer: MEDICARE

## 2023-03-07 ENCOUNTER — OFFICE VISIT (OUTPATIENT)
Dept: PODIATRY | Facility: CLINIC | Age: 78
End: 2023-03-07
Payer: MEDICARE

## 2023-03-07 VITALS
BODY MASS INDEX: 43.24 KG/M2 | SYSTOLIC BLOOD PRESSURE: 129 MMHG | HEART RATE: 99 BPM | DIASTOLIC BLOOD PRESSURE: 64 MMHG | WEIGHT: 235 LBS | HEIGHT: 62 IN

## 2023-03-07 DIAGNOSIS — L84 CORN OR CALLUS: ICD-10-CM

## 2023-03-07 DIAGNOSIS — E11.49 TYPE II DIABETES MELLITUS WITH NEUROLOGICAL MANIFESTATIONS: Primary | ICD-10-CM

## 2023-03-07 DIAGNOSIS — B35.1 DERMATOPHYTOSIS OF NAIL: ICD-10-CM

## 2023-03-07 PROCEDURE — 3288F PR FALLS RISK ASSESSMENT DOCUMENTED: ICD-10-PCS | Mod: CPTII,S$GLB,, | Performed by: PODIATRIST

## 2023-03-07 PROCEDURE — 1160F PR REVIEW ALL MEDS BY PRESCRIBER/CLIN PHARMACIST DOCUMENTED: ICD-10-PCS | Mod: CPTII,S$GLB,, | Performed by: PODIATRIST

## 2023-03-07 PROCEDURE — 3074F SYST BP LT 130 MM HG: CPT | Mod: CPTII,S$GLB,, | Performed by: PODIATRIST

## 2023-03-07 PROCEDURE — 3072F LOW RISK FOR RETINOPATHY: CPT | Mod: CPTII,S$GLB,, | Performed by: PODIATRIST

## 2023-03-07 PROCEDURE — 3074F PR MOST RECENT SYSTOLIC BLOOD PRESSURE < 130 MM HG: ICD-10-PCS | Mod: CPTII,S$GLB,, | Performed by: PODIATRIST

## 2023-03-07 PROCEDURE — 3072F PR LOW RISK FOR RETINOPATHY: ICD-10-PCS | Mod: CPTII,S$GLB,, | Performed by: PODIATRIST

## 2023-03-07 PROCEDURE — 3078F DIAST BP <80 MM HG: CPT | Mod: CPTII,S$GLB,, | Performed by: PODIATRIST

## 2023-03-07 PROCEDURE — 11056 PARNG/CUTG B9 HYPRKR LES 2-4: CPT | Mod: Q9,S$GLB,, | Performed by: PODIATRIST

## 2023-03-07 PROCEDURE — 11721 DEBRIDE NAIL 6 OR MORE: CPT | Mod: Q9,59,S$GLB, | Performed by: PODIATRIST

## 2023-03-07 PROCEDURE — 11056 ROUTINE FOOT CARE: ICD-10-PCS | Mod: Q9,S$GLB,, | Performed by: PODIATRIST

## 2023-03-07 PROCEDURE — 99499 NO LOS: ICD-10-PCS | Mod: S$GLB,,, | Performed by: PODIATRIST

## 2023-03-07 PROCEDURE — 1159F MED LIST DOCD IN RCRD: CPT | Mod: CPTII,S$GLB,, | Performed by: PODIATRIST

## 2023-03-07 PROCEDURE — 11721 ROUTINE FOOT CARE: ICD-10-PCS | Mod: Q9,59,S$GLB, | Performed by: PODIATRIST

## 2023-03-07 PROCEDURE — 1160F RVW MEDS BY RX/DR IN RCRD: CPT | Mod: CPTII,S$GLB,, | Performed by: PODIATRIST

## 2023-03-07 PROCEDURE — 3288F FALL RISK ASSESSMENT DOCD: CPT | Mod: CPTII,S$GLB,, | Performed by: PODIATRIST

## 2023-03-07 PROCEDURE — 1126F PR PAIN SEVERITY QUANTIFIED, NO PAIN PRESENT: ICD-10-PCS | Mod: CPTII,S$GLB,, | Performed by: PODIATRIST

## 2023-03-07 PROCEDURE — 1159F PR MEDICATION LIST DOCUMENTED IN MEDICAL RECORD: ICD-10-PCS | Mod: CPTII,S$GLB,, | Performed by: PODIATRIST

## 2023-03-07 PROCEDURE — 99999 PR PBB SHADOW E&M-EST. PATIENT-LVL III: ICD-10-PCS | Mod: PBBFAC,,, | Performed by: PODIATRIST

## 2023-03-07 PROCEDURE — 1101F PR PT FALLS ASSESS DOC 0-1 FALLS W/OUT INJ PAST YR: ICD-10-PCS | Mod: CPTII,S$GLB,, | Performed by: PODIATRIST

## 2023-03-07 PROCEDURE — 1126F AMNT PAIN NOTED NONE PRSNT: CPT | Mod: CPTII,S$GLB,, | Performed by: PODIATRIST

## 2023-03-07 PROCEDURE — 99499 UNLISTED E&M SERVICE: CPT | Mod: S$GLB,,, | Performed by: PODIATRIST

## 2023-03-07 PROCEDURE — 99999 PR PBB SHADOW E&M-EST. PATIENT-LVL III: CPT | Mod: PBBFAC,,, | Performed by: PODIATRIST

## 2023-03-07 PROCEDURE — 3078F PR MOST RECENT DIASTOLIC BLOOD PRESSURE < 80 MM HG: ICD-10-PCS | Mod: CPTII,S$GLB,, | Performed by: PODIATRIST

## 2023-03-07 PROCEDURE — 1101F PT FALLS ASSESS-DOCD LE1/YR: CPT | Mod: CPTII,S$GLB,, | Performed by: PODIATRIST

## 2023-03-07 NOTE — TELEPHONE ENCOUNTER
I called the patient today regarding surgery on 3/9/2023 with Dr. Peterson Sharma. I informed the patient that her surgery will be at  [facility] ([address] [place] [id number]). I informed the patient they must arrive at 9:00am and their surgery will start at 11:00am.     I reminded the patient that they cannot eat or drink after midnight, the night before surgery.     I reminded the patient to be careful of their skin over the next few days to make sure they do not get any cuts, scratches or scrapes.    The patient verbalized understanding and has no further questions.

## 2023-03-07 NOTE — PROGRESS NOTES
Chief Concern: Diabetic Foot Exam (Foot Exam/PCP Liz Roberts MD/Endo  03/02/23)      HPI:  Kaylee Romero is a 78 y.o. female presents for foot exam and care.       PCP:  Liz Roberts MD, Diabetic Foot Exam (Foot Exam/PCP Liz Roberts MD/Endo  03/02/23)             Patient Active Problem List   Diagnosis    Primary osteoarthritis of right knee    Essential hypertension    Dyslipidemia, goal LDL below 70    Morbid obesity with body mass index (BMI) of 40.0 or higher    KAMRAN on CPAP    History of total knee arthroplasty, left    Physical deconditioning    Osteoporosis without current pathological fracture    History of TIA (transient ischemic attack)    Age-related osteoporosis without current pathological fracture    Vitamin D deficiency, unspecified    Type 2 diabetes mellitus with stage 3 chronic kidney disease, without long-term current use of insulin    History of MI (myocardial infarction)    Coronary 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 pectoris    S/P CABG (coronary artery bypass graft)    Shoulder pain    Physical debility    GRANADO (dyspnea on exertion)    S/P drug eluting coronary stent placement    Abnormal stress test    Aortic atherosclerosis    Chronic heart failure with preserved ejection fraction    Decreased range of motion of right knee    Gait, antalgic           Current Outpatient Medications on File Prior to Visit   Medication Sig Dispense Refill    acetaminophen (TYLENOL) 500 MG tablet Take 500 mg by mouth 2 (two) times daily as needed for Pain.      alendronate (FOSAMAX) 70 MG tablet TAKE 1 TABLET(70 MG) BY MOUTH EVERY 7 DAYS 12 tablet 1    amLODIPine (NORVASC) 10 MG tablet TAKE 1 TABLET(10 MG) BY MOUTH EVERY DAY 90 tablet 2    ammonium lactate (LAC-HYDRIN) 12 % lotion Apply topically as needed for Dry Skin. On the feet and toenails. 225 g 6    atorvastatin (LIPITOR) 80 MG tablet TAKE 1 TABLET(80 MG) BY MOUTH EVERY DAY 90 tablet 3    BD  "BOO 2ND GEN PEN NEEDLE 32 gauge x 5/32" Ndle USE EVERY  each 8    blood sugar diagnostic Strp 1 strip by Misc.(Non-Drug; Combo Route) route once daily. 100 strip 3    carvediloL (COREG) 25 MG tablet TAKE 1 TABLET(25 MG) BY MOUTH TWICE DAILY WITH MEALS 180 tablet 1    clopidogreL (PLAVIX) 75 mg tablet TAKE 1 TABLET(75 MG) BY MOUTH EVERY DAY 90 tablet 3    COD LIVER OIL ORAL TAKE 2 CAPSULES BY MOUTH EVERY MORNING AND 1 CAPSULE EVERY EVENING      diclofenac sodium (VOLTAREN) 1 % Gel Apply 2 g topically 4 (four) times daily as needed. To painful area on the feet. (Patient taking differently: Apply 2 g topically 4 (four) times daily as needed. To painful area on the feet and knee) 500 g 5    diphenoxylate-atropine 2.5-0.025 mg (LOMOTIL) 2.5-0.025 mg per tablet TAKE 1 TABLET BY MOUTH FOUR TIMES DAILY AS NEEDED FOR DIARRHEA 30 tablet 30    dulaglutide (TRULICITY) 4.5 mg/0.5 mL pen injector Inject 4.5 mg into the skin every 7 days. (Patient taking differently: Inject 4.5 mg into the skin every Sunday.) 2 mL 3    FEROSUL 325 mg (65 mg iron) Tab tablet TAKE 1 TABLET BY MOUTH DAILY 90 tablet 1    FLUZONE HIGHDOSE QUAD 22-23  mcg/0.7 mL Syrg       furosemide (LASIX) 20 MG tablet TAKE 1 TABLET BY MOUTH ON SATURDAY, SUNDAY, MONDAY, WEDNESDAY AND FRIDAY 30 tablet 3    glipiZIDE (GLUCOTROL) 5 MG tablet TAKE 2 TABLETS BY MOUTH WITH DINNER OR EVENING MEAL 120 tablet 3    glucagon (GVOKE HYPOPEN 2-PACK) 1 mg/0.2 mL AtIn Inject 1 Package into the skin as needed. 2 Syringe 0    insulin glargine,hum.rec.anlog (BASAGLAR KWIKPEN U-100 INSULIN SUBQ) Inject 22 Units into the skin every evening.      irbesartan (AVAPRO) 300 MG tablet TAKE 1 TABLET(300 MG) BY MOUTH EVERY EVENING 90 tablet 2    isosorbide mononitrate (IMDUR) 30 MG 24 hr tablet Take 2 tablets (60 mg total) by mouth once daily. 180 tablet 3    lancets Misc 1 lancet by Misc.(Non-Drug; Combo Route) route once daily. 100 each 3    multivitamin (THERAGRAN) per tablet " "Take 1 tablet by mouth once daily.      nitroGLYCERIN (NITROSTAT) 0.4 MG SL tablet Place 1 tablet (0.4 mg total) under the tongue every 5 (five) minutes as needed for Chest pain. 25 tablet 6    PFIZER COVID BIVAL,12Y UP,,PF, 30 mcg/0.3 mL injection       SHINGRIX, PF, 50 mcg/0.5 mL injection       vitamin D (VITAMIN D3) 1000 units Tab Take 1,000 Units by mouth twice a week. Monday AND THURSDAYS ONLY      aspirin 81 MG Chew Take 1 tablet (81 mg total) by mouth once daily.  0     Current Facility-Administered Medications on File Prior to Visit   Medication Dose Route Frequency Provider Last Rate Last Admin    hyaluronate sodium, stabilized (MONOVISC) Syrg   Intra-articular 1 time in Clinic/HOD Peterson Sharma MD             Review of patient's allergies indicates:   Allergen Reactions    Keflex [cephalexin] Other (See Comments)     Turns orange    Bactrim [sulfamethoxazole-trimethoprim]      Generic version  Sulfa makes her sick               Objective:        Vitals:    03/07/23 1506   BP: 129/64   Pulse: 99   Weight: 106.6 kg (235 lb)   Height: 5' 2" (1.575 m)         Physical Exam  Constitutional:       Appearance: She is well-developed.   Cardiovascular:      Comments: DP and PT pulses 2/4 loreta.   Edema noted loreta.   Capillary refill time < 3 seconds to digits 1-5, loreta.   Absent hair growth      Musculoskeletal:         General: Deformity present. No tenderness. Normal range of motion.      Comments: HAV noted to loreta 1st MPJ. 5/5 strength to all LE muscle groups. No POP noted     Skin:     Capillary Refill: Capillary refill takes 2 to 3 seconds.      Findings: No erythema.      Comments: Toenails x 10 are elongated by 1-3mm, thickened by 1-3mm and mycotic with subungual debris.  Flaky skin on the soles.  No vesicles or redness.      Neurological:      Mental Status: She is alert and oriented to person, place, and time.      Comments: +paresthesias (Abnormal spontaneous sensations in feet)         "         Assessment:       Problem List Items Addressed This Visit    None  Visit Diagnoses       Type II diabetes mellitus with neurological manifestations    -  Primary    Dermatophytosis of nail        Corn or callus              Plan:         I counseled the patient on the patient's conditions, their implications and medical management.  Shoe inspection.     Maintain proper foot hygiene.   Continue wearing proper shoe gear, daily foot inspections, never walking without protective shoe gear, never putting sharp instruments to feet.    Routine Foot Care    Date/Time: 3/7/2023 3:00 PM  Performed by: Donna Gillis DPM  Authorized by: Donna Gillis DPM     Consent Done?:  Yes (Verbal)  Hyperkeratotic Skin Lesions?: Yes    Number of trimmed lesions:  2  Location(s):  Left 1st Metatarsal Head and Right 1st Metatarsal Head    Nail Care Type:  Debride  Location(s): All  (Left 1st Toe, Left 3rd Toe, Left 2nd Toe, Left 4th Toe, Left 5th Toe, Right 1st Toe, Right 2nd Toe, Right 3rd Toe, Right 4th Toe and Right 5th Toe)  Patient tolerance:  Patient tolerated the procedure well with no immediate complications     With patient's permission, the toenails mentioned above were reduced and debrided using a nail nipper, removing offending nail and debris.   Utilizing a #15 scalpel, I trimmed the corns and calluses at the above mentioned location.      The patient will continue to monitor the areas daily, inspect the feet, wear protective shoe gear when ambulatory, and moisturizer to maintain skin integrity.                   Donna Gillis DPM

## 2023-03-08 ENCOUNTER — ANESTHESIA EVENT (OUTPATIENT)
Dept: SURGERY | Facility: HOSPITAL | Age: 78
End: 2023-03-08
Payer: MEDICARE

## 2023-03-08 NOTE — ANESTHESIA PREPROCEDURE EVALUATION
[facility]  Anesthesia Pre-Operative Evaluation         Patient Name: Kaylee Romero  YOB: 1945  MRN: [id number]    SUBJECTIVE:     Pre-operative evaluation for Procedure(s) (LRB):  ARTHROPLASTY, KNEE, TOTAL:RIGHT THELMA NEXGEN (Right)     03/08/2023    Kaylee Romero is a 78 y.o. female w/ a significant PMHx of TIA in 2019 w/o residual deficits, HTN, CAD s/p CABGx3 in 2019, CHFpEF, DM2, CKD3, KAMRAN w/CPAP.     Patient now presents for the above procedure(s).      LDA: None documented.       Prev airway: Present Prior to Hospital Arrival?: No; Placement Date: 08/12/19; Placement Time: 0728; Method of Intubation: Direct laryngoscopy; Inserted by: Anesthesia Resident; Airway Device: Endotracheal Tube; Mask Ventilation: Easy - oral; Intubated: Postinduction; Blade: Isaak #3; Airway Device Size: 7.5; Style: Cuffed; Cuff Inflation: Minimal occlusive pressure; Inflation Amount: 5; Placement Verified By: Auscultation, Capnometry, ETT Condensation; Grade: Grade I; Complicating Factors: Anterior larynx, Obesity; Intubation Findings: Positive EtCO2, Bilateral breath sounds, Atraumatic/Condition of teeth unchanged;  Depth of Insertion: 22; Securment: Lips; Complications: None; Breath Sounds: Equal Bilateral; Insertion Attempts: 1; Removal Date: 08/12/19;  Removal Time: 2144; Name of Person who Removed: ОЛЬГА Lee RRT    Drips: None documented.      Patient Active Problem List   Diagnosis    Primary osteoarthritis of right knee    Essential hypertension    Dyslipidemia, goal LDL below 70    Morbid obesity with body mass index (BMI) of 40.0 or higher    KAMRAN on CPAP    History of total knee arthroplasty, left    Physical deconditioning    Osteoporosis without current pathological fracture    History of TIA (transient ischemic attack)    Age-related osteoporosis without current pathological fracture    Vitamin D deficiency, unspecified    Type 2 diabetes mellitus with  "stage 3 chronic kidney disease, without long-term current use of insulin    History of MI (myocardial infarction)    Coronary 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 pectoris    S/P CABG (coronary artery bypass graft)    Shoulder pain    Physical debility    GRANADO (dyspnea on exertion)    S/P drug eluting coronary stent placement    Abnormal stress test    Aortic atherosclerosis    Chronic heart failure with preserved ejection fraction    Decreased range of motion of right knee    Gait, antalgic       Review of patient's allergies indicates:   Allergen Reactions    Bactrim [sulfamethoxazole-trimethoprim] Other (See Comments)     Generic version  Sulfa makes her sick    Keflex [cephalexin] Other (See Comments)     Turns orange       Current Inpatient Medications:   hyaluronate sodium, stabilized   Intra-articular 1 time in Clinic/HOD       Current Facility-Administered Medications on File Prior to Encounter   Medication Dose Route Frequency Provider Last Rate Last Admin    hyaluronate sodium, stabilized (MONOVISC) Syrg   Intra-articular 1 time in Clinic/HOD Peterson Sharma MD         Current Outpatient Medications on File Prior to Encounter   Medication Sig Dispense Refill    acetaminophen (TYLENOL) 500 MG tablet Take 500 mg by mouth 2 (two) times daily as needed for Pain.      amLODIPine (NORVASC) 10 MG tablet TAKE 1 TABLET(10 MG) BY MOUTH EVERY DAY 90 tablet 2    ammonium lactate (LAC-HYDRIN) 12 % lotion Apply topically as needed for Dry Skin. On the feet and toenails. 225 g 6    aspirin 81 MG Chew Take 1 tablet (81 mg total) by mouth once daily.  0    atorvastatin (LIPITOR) 80 MG tablet TAKE 1 TABLET(80 MG) BY MOUTH EVERY DAY 90 tablet 3    BD BOO 2ND GEN PEN NEEDLE 32 gauge x 5/32" Ndle USE EVERY  each 8    blood sugar diagnostic Strp 1 strip by Misc.(Non-Drug; Combo Route) route once daily. 100 strip 3    carvediloL (COREG) 25 MG tablet TAKE 1 TABLET(25 " MG) BY MOUTH TWICE DAILY WITH MEALS 180 tablet 1    clopidogreL (PLAVIX) 75 mg tablet TAKE 1 TABLET(75 MG) BY MOUTH EVERY DAY 90 tablet 3    COD LIVER OIL ORAL TAKE 2 CAPSULES BY MOUTH EVERY MORNING AND 1 CAPSULE EVERY EVENING      diclofenac sodium (VOLTAREN) 1 % Gel Apply 2 g topically 4 (four) times daily as needed. To painful area on the feet. (Patient taking differently: Apply 2 g topically 4 (four) times daily as needed. To painful area on the feet and knee) 500 g 5    diphenoxylate-atropine 2.5-0.025 mg (LOMOTIL) 2.5-0.025 mg per tablet TAKE 1 TABLET BY MOUTH FOUR TIMES DAILY AS NEEDED FOR DIARRHEA 30 tablet 30    dulaglutide (TRULICITY) 4.5 mg/0.5 mL pen injector Inject 4.5 mg into the skin every 7 days. (Patient taking differently: Inject 4.5 mg into the skin every .) 2 mL 3    FEROSUL 325 mg (65 mg iron) Tab tablet TAKE 1 TABLET BY MOUTH DAILY 90 tablet 1    glucagon (GVOKE HYPOPEN 2-PACK) 1 mg/0.2 mL AtIn Inject 1 Package into the skin as needed. 2 Syringe 0    insulin glargine,hum.rec.anlog (BASAGLAR KWIKPEN U-100 INSULIN SUBQ) Inject 22 Units into the skin every evening.      irbesartan (AVAPRO) 300 MG tablet TAKE 1 TABLET(300 MG) BY MOUTH EVERY EVENING 90 tablet 2    isosorbide mononitrate (IMDUR) 30 MG 24 hr tablet Take 2 tablets (60 mg total) by mouth once daily. 180 tablet 3    lancets Misc 1 lancet by Misc.(Non-Drug; Combo Route) route once daily. 100 each 3    multivitamin (THERAGRAN) per tablet Take 1 tablet by mouth once daily.      nitroGLYCERIN (NITROSTAT) 0.4 MG SL tablet Place 1 tablet (0.4 mg total) under the tongue every 5 (five) minutes as needed for Chest pain. 25 tablet 6    vitamin D (VITAMIN D3) 1000 units Tab Take 1,000 Units by mouth twice a week. Monday AND  ONLY         Past Surgical History:   Procedure Laterality Date    ankle surgery (l) Left     APPENDECTOMY       SECTION      CORONARY ARTERY BYPASS GRAFT  09/2019    x 2    CORONARY  ARTERY BYPASS GRAFT (CABG) N/A 08/12/2019    Procedure: CORONARY ARTERY BYPASS GRAFT (CABG)X3;  Surgeon: Peterson Ventura MD;  Location: [address];  Service: Cardiovascular;  Laterality: N/A;    CORONARY BYPASS GRAFT ANGIOGRAPHY  10/18/2021    Procedure: Bypass graft study;  Surgeon: Jamar Haddad MD;  Location: [facility] CATH LAB;  Service: Cardiology;;    DILATION AND CURETTAGE OF UTERUS      ENDOSCOPIC HARVEST OF VEIN Left 08/12/2019    Procedure: SURGICAL PROCUREMENT, VEIN, ENDOSCOPIC;  Surgeon: Peterson Ventura MD;  [facility];  Service: Cardiovascular;  Laterality: Left;  Vein [place] Start: 0843; End: 1054   Vein Prep Start: 1055; End: 1145    JOINT REPLACEMENT Left     knee replacement    KNEE ARTHROSCOPY W/ DEBRIDEMENT      KNEE SURGERY Left 05/01/2017    TKR    LEFT HEART CATHETERIZATION Left 07/09/2019    Procedure: Left heart cath;  Surgeon: Ronak Gibbons MD;  Location: [facility] CATH LAB;  Service: Cardiology;  Laterality: Left;    LEFT HEART CATHETERIZATION Left 10/18/2021    Procedure: Left heart cath;  Surgeon: Jamar Haddad MD;  Location: [facility] CATH LAB;  Service: Cardiology;  Laterality: Left;       Social History     Socioeconomic History    Marital status:     Number of children: 1   Tobacco Use    Smoking status: Never    Smokeless tobacco: Never   Substance and Sexual Activity    Alcohol use: Yes     Alcohol/week: 1.0 standard drink     Types: 1 Shots of liquor per week     Comment: rare    Drug use: No    Sexual activity: Yes     Partners: Male     Birth control/protection: Post-menopausal   Social History Narrative     with a son living locally.  at [employer], Retired 5/18.     Social Determinants of Health     Financial Resource Strain: Low Risk     Difficulty of Paying Living Expenses: Not hard at all   Food Insecurity: No Food Insecurity    Worried About Running Out of Food in the Last Year: Never true     Ran Out of Food in the Last Year: Never true   Transportation Needs: No Transportation Needs    Lack of Transportation (Medical): No    Lack of Transportation (Non-Medical): No   Physical Activity: Sufficiently Active    Days of Exercise per Week: 5 days    Minutes of Exercise per Session: 60 min   Stress: No Stress Concern Present    Feeling of Stress : Not at all   Social Connections: Unknown    Frequency of Communication with Friends and Family: More than three times a week    Frequency of Social Gatherings with Friends and Family: Once a week    Active Member of Clubs or Organizations: No    Attends Club or Organization Meetings: Never    Marital Status:    Housing Stability: Low Risk     Unable to Pay for Housing in the Last Year: No    Number of Places Lived in the Last Year: 1    Unstable Housing in the Last Year: No       OBJECTIVE:     Vital Signs Range (Last 24H):  Pulse:  [99]   BP: (129)/(64)       Significant Labs:  Lab Results   Component Value Date    WBC 4.16 02/16/2023    HGB 11.3 (L) 02/16/2023    HCT 35.8 (L) 02/16/2023     02/16/2023    CHOL 88 (L) 09/29/2022    TRIG 83 09/29/2022    HDL 32 (L) 09/29/2022    ALT 19 02/16/2023    AST 19 02/16/2023     02/16/2023    K 4.4 02/16/2023     02/16/2023    CREATININE 1.0 02/16/2023    BUN 17 02/16/2023    CO2 24 02/16/2023    TSH 3.704 05/04/2022    INR 1.1 02/16/2023    GLUF 83 09/29/2022    HGBA1C 5.9 (H) 02/16/2023       Diagnostic Studies: No relevant studies.    EKG:   Results for orders placed or performed during the hospital encounter of 02/16/23   EKG 12-lead    Collection Time: 02/16/23 12:45 PM    Narrative    Test Reason : I10,    Vent. Rate : 084 BPM     Atrial Rate : 084 BPM     P-R Int : 246 ms          QRS Dur : 080 ms      QT Int : 384 ms       P-R-T Axes : 067 -23 036 degrees     QTc Int : 453 ms    Sinus rhythm with 1st degree A-V block with Premature supraventricular  complexes  Voltage  "criteria for left ventricular hypertrophy  Abnormal ECG  When compared with ECG of 29-SEP-2022 14:40,  Minor Nonspecific T wave abnormality Now present  Confirmed by BRIAN BUCKLEY MD ([id number]) on 2/16/2023 1:22:12 PM    Referred By: LUCAS TRACY           Confirmed By:BRIAN BUCKLEY MD       2D ECHO:  TTE:  Results for orders placed or performed during the hospital encounter of 07/22/22   Echo   Result Value Ref Range    Ascending aorta 2.90 cm    STJ 2.74 cm    AV mean gradient 11 mmHg    Ao peak lor 2.04 m/s    Ao VTI 43.36 cm    IVS 1.00 0.6 - 1.1 cm    LA size 4.24 cm    Left Atrium Major Axis 4.39 cm    Left Atrium Minor Axis 4.49 cm    LVIDd 4.18 3.5 - 6.0 cm    LVIDs 2.73 2.1 - 4.0 cm    LVOT diameter 2.10 cm    LVOT peak VTI 27.34 cm    Posterior Wall 0.99 0.6 - 1.1 cm    MV Peak A Lor 1.15 m/s    E wave deceleration time 167.39 msec    MV Peak E Lor 1.22 m/s    PV Peak D Lor 0.57 m/s    PV Peak S Lor 0.59 m/s    RA Major Axis 4.26 cm    RA Width 2.98 cm    RVDD 3.25 cm    Sinus 2.48 cm    TAPSE 1.73 cm    TR Max Lor 2.18 m/s    TDI LATERAL 0.09 m/s    TDI SEPTAL 0.08 m/s    LA WIDTH 3.39 cm    MV stenosis pressure 1/2 time 48.54 ms    LV Diastolic Volume 77.87 mL    LV Systolic Volume 27.83 mL    RV S' 7.97 cm/s    LVOT peak lor 1.23 m/s    LA volume (mod) 54.09 cm3    MV "A" wave duration 7.71 msec    MV mean gradient 1 mmHg    MV peak gradient 5 mmHg    MV VTI 25.67 cm    LV LATERAL E/E' RATIO 13.56 m/s    LV SEPTAL E/E' RATIO 15.25 m/s    FS 35 %    LA volume 54.24 cm3    LV mass 135.26 g    Left Ventricle Relative Wall Thickness 0.47 cm    AV valve area 2.18 cm2    AV Velocity Ratio 0.60     AV index (prosthetic) 0.63     MV valve area p 1/2 method 4.53 cm2    MV valve area by continuity eq 3.69 cm2    E/A ratio 1.06     Mean e' 0.09 m/s    Pulm vein S/D ratio 1.04     LVOT area 3.5 cm2    LVOT stroke volume 94.65 cm3    AV peak gradient 17 mmHg    E/E' ratio 14.35 m/s    Triscuspid Valve Regurgitation " Peak Gradient 19 mmHg    BSA 2.16 m2    LV Systolic Volume Index 13.6 mL/m2    LV Diastolic Volume Index 37.99 mL/m2    LA Volume Index 26.5 mL/m2    LV Mass Index 66 g/m2    LA Volume Index (Mod) 26.4 mL/m2    Right Atrial Pressure (from IVC) 8 mmHg    EF 68 %    TV rest pulmonary artery pressure 27 mmHg    Narrative    · The left ventricle is normal in size with concentric remodeling and   normal systolic function.  · The estimated ejection fraction is 68%.  · Indeterminate left ventricular diastolic function.  · Normal right ventricular size with normal right ventricular systolic   function.  · There is mild aortic valve stenosis.  · Aortic valve area is 2.18 cm2; peak velocity is 2.04 m/s; mean gradient   is 11 mmHg.  · Intermediate central venous pressure (8 mmHg).  · The estimated PA systolic pressure is 27 mmHg.          SILVA:  No results found. However, due to the size of the patient record, not all encounters were searched. Please check Results Review for a complete set of results.    ASSESSMENT/PLAN:           Pre-op Assessment    I have reviewed the Patient Summary Reports.     I have reviewed the Nursing Notes. I have reviewed the NPO Status.   I have reviewed the Medications.     Review of Systems  Anesthesia Hx:   Denies Personal Hx of Anesthesia complications.   Cardiovascular:   Hypertension CAD  CABG/stent  CHF    Pulmonary:   Sleep Apnea, CPAP    Renal/:   Chronic Renal Disease    Hepatic/GI:   Denies Liver Disease.    Musculoskeletal:   Arthritis     Neurological:   TIA,    Endocrine:   Diabetes, type 2        Physical Exam  General: Well nourished and Cooperative    Airway:  Mallampati: III   Mouth Opening: Normal  Neck ROM: Normal ROM    Dental:  Intact    Chest/Lungs:  Clear to auscultation, Normal Respiratory Rate    Heart:  Rate: Normal        Anesthesia Plan  Type of Anesthesia, risks & benefits discussed:    Anesthesia Type: CSE  Intra-op Monitoring Plan: Standard ASA Monitors  Post Op  Pain Control Plan: multimodal analgesia and peripheral nerve block  Induction:  IV  Informed Consent: Informed consent signed with the Patient and all parties understand the risks and agree with anesthesia plan.  All questions answered.   ASA Score: 3  Day of Surgery Review of History & Physical: H&P Update referred to the surgeon/provider.    Ready For Surgery From Anesthesia Perspective.     .

## 2023-03-09 ENCOUNTER — HOSPITAL ENCOUNTER (OUTPATIENT)
Facility: HOSPITAL | Age: 78
Discharge: HOME | End: 2023-03-12
Attending: ORTHOPAEDIC SURGERY | Admitting: ORTHOPAEDIC SURGERY
Payer: MEDICARE

## 2023-03-09 ENCOUNTER — ANESTHESIA (OUTPATIENT)
Dept: SURGERY | Facility: HOSPITAL | Age: 78
End: 2023-03-09
Payer: MEDICARE

## 2023-03-09 DIAGNOSIS — I70.0 AORTIC ATHEROSCLEROSIS: ICD-10-CM

## 2023-03-09 DIAGNOSIS — R53.81 PHYSICAL DEBILITY: ICD-10-CM

## 2023-03-09 DIAGNOSIS — R53.81 PHYSICAL DECONDITIONING: ICD-10-CM

## 2023-03-09 DIAGNOSIS — Z96.652 HISTORY OF TOTAL KNEE ARTHROPLASTY, LEFT: ICD-10-CM

## 2023-03-09 DIAGNOSIS — E66.01 MORBID OBESITY WITH BODY MASS INDEX (BMI) OF 40.0 OR HIGHER: ICD-10-CM

## 2023-03-09 DIAGNOSIS — N18.31 TYPE 2 DIABETES MELLITUS WITH STAGE 3A CHRONIC KIDNEY DISEASE, WITHOUT LONG-TERM CURRENT USE OF INSULIN: Chronic | ICD-10-CM

## 2023-03-09 DIAGNOSIS — I50.32 CHRONIC HEART FAILURE WITH PRESERVED EJECTION FRACTION: ICD-10-CM

## 2023-03-09 DIAGNOSIS — I10 ESSENTIAL HYPERTENSION: Primary | Chronic | ICD-10-CM

## 2023-03-09 DIAGNOSIS — M25.661 DECREASED RANGE OF MOTION OF RIGHT KNEE: ICD-10-CM

## 2023-03-09 DIAGNOSIS — E55.9 VITAMIN D DEFICIENCY, UNSPECIFIED: ICD-10-CM

## 2023-03-09 DIAGNOSIS — Z86.73 HISTORY OF TIA (TRANSIENT ISCHEMIC ATTACK): ICD-10-CM

## 2023-03-09 DIAGNOSIS — M25.519 SHOULDER PAIN, UNSPECIFIED CHRONICITY, UNSPECIFIED LATERALITY: ICD-10-CM

## 2023-03-09 DIAGNOSIS — M17.11 PRIMARY OSTEOARTHRITIS OF RIGHT KNEE: ICD-10-CM

## 2023-03-09 DIAGNOSIS — Z95.1 S/P CABG (CORONARY ARTERY BYPASS GRAFT): ICD-10-CM

## 2023-03-09 DIAGNOSIS — M81.0 OSTEOPOROSIS WITHOUT CURRENT PATHOLOGICAL FRACTURE, UNSPECIFIED OSTEOPOROSIS TYPE: Chronic | ICD-10-CM

## 2023-03-09 DIAGNOSIS — E78.5 DYSLIPIDEMIA, GOAL LDL BELOW 70: ICD-10-CM

## 2023-03-09 DIAGNOSIS — R06.09 DOE (DYSPNEA ON EXERTION): ICD-10-CM

## 2023-03-09 DIAGNOSIS — M81.0 AGE-RELATED OSTEOPOROSIS WITHOUT CURRENT PATHOLOGICAL FRACTURE: ICD-10-CM

## 2023-03-09 DIAGNOSIS — E11.22 TYPE 2 DIABETES MELLITUS WITH STAGE 3A CHRONIC KIDNEY DISEASE, WITHOUT LONG-TERM CURRENT USE OF INSULIN: Chronic | ICD-10-CM

## 2023-03-09 DIAGNOSIS — R94.39 ABNORMAL STRESS TEST: ICD-10-CM

## 2023-03-09 DIAGNOSIS — R26.89 GAIT, ANTALGIC: ICD-10-CM

## 2023-03-09 DIAGNOSIS — Z95.5 S/P DRUG ELUTING CORONARY STENT PLACEMENT: ICD-10-CM

## 2023-03-09 DIAGNOSIS — I25.118 CORONARY ARTERY DISEASE OF NATIVE ARTERY OF NATIVE HEART WITH STABLE ANGINA PECTORIS: ICD-10-CM

## 2023-03-09 DIAGNOSIS — G47.33 OSA ON CPAP: ICD-10-CM

## 2023-03-09 LAB
ABO + RH BLD: NORMAL
BLD GP AB SCN CELLS X3 SERPL QL: NORMAL
POCT GLUCOSE: 104 MG/DL (ref 70–110)
POCT GLUCOSE: 148 MG/DL (ref 70–110)
POCT GLUCOSE: 160 MG/DL (ref 70–110)
POCT GLUCOSE: 179 MG/DL (ref 70–110)

## 2023-03-09 PROCEDURE — C1776 JOINT DEVICE (IMPLANTABLE): HCPCS | Performed by: ORTHOPAEDIC SURGERY

## 2023-03-09 PROCEDURE — 25000003 PHARM REV CODE 250: Performed by: NURSE PRACTITIONER

## 2023-03-09 PROCEDURE — 99900035 HC TECH TIME PER 15 MIN (STAT)

## 2023-03-09 PROCEDURE — 25000003 PHARM REV CODE 250: Performed by: STUDENT IN AN ORGANIZED HEALTH CARE EDUCATION/TRAINING PROGRAM

## 2023-03-09 PROCEDURE — 63600175 PHARM REV CODE 636 W HCPCS: Performed by: ORTHOPAEDIC SURGERY

## 2023-03-09 PROCEDURE — G0378 HOSPITAL OBSERVATION PER HR: HCPCS

## 2023-03-09 PROCEDURE — D9220A PRA ANESTHESIA: ICD-10-PCS | Mod: ANES,,, | Performed by: ANESTHESIOLOGY

## 2023-03-09 PROCEDURE — C1713 ANCHOR/SCREW BN/BN,TIS/BN: HCPCS | Performed by: ORTHOPAEDIC SURGERY

## 2023-03-09 PROCEDURE — 63600175 PHARM REV CODE 636 W HCPCS: Performed by: STUDENT IN AN ORGANIZED HEALTH CARE EDUCATION/TRAINING PROGRAM

## 2023-03-09 PROCEDURE — 25000003 PHARM REV CODE 250: Performed by: NURSE ANESTHETIST, CERTIFIED REGISTERED

## 2023-03-09 PROCEDURE — 94761 N-INVAS EAR/PLS OXIMETRY MLT: CPT

## 2023-03-09 PROCEDURE — D9220A PRA ANESTHESIA: Mod: CRNA,,, | Performed by: NURSE ANESTHETIST, CERTIFIED REGISTERED

## 2023-03-09 PROCEDURE — 36000710: Performed by: ORTHOPAEDIC SURGERY

## 2023-03-09 PROCEDURE — 37000008 HC ANESTHESIA 1ST 15 MINUTES: Performed by: ORTHOPAEDIC SURGERY

## 2023-03-09 PROCEDURE — 37000009 HC ANESTHESIA EA ADD 15 MINS: Performed by: ORTHOPAEDIC SURGERY

## 2023-03-09 PROCEDURE — 63600175 PHARM REV CODE 636 W HCPCS: Performed by: NURSE PRACTITIONER

## 2023-03-09 PROCEDURE — 71000033 HC RECOVERY, INTIAL HOUR: Performed by: ORTHOPAEDIC SURGERY

## 2023-03-09 PROCEDURE — 71000016 HC POSTOP RECOV ADDL HR: Performed by: ORTHOPAEDIC SURGERY

## 2023-03-09 PROCEDURE — 27447 TOTAL KNEE ARTHROPLASTY: CPT | Mod: RT,,, | Performed by: ORTHOPAEDIC SURGERY

## 2023-03-09 PROCEDURE — D9220A PRA ANESTHESIA: ICD-10-PCS | Mod: CRNA,,, | Performed by: NURSE ANESTHETIST, CERTIFIED REGISTERED

## 2023-03-09 PROCEDURE — 63600175 PHARM REV CODE 636 W HCPCS: Performed by: NURSE ANESTHETIST, CERTIFIED REGISTERED

## 2023-03-09 PROCEDURE — 36000711: Performed by: ORTHOPAEDIC SURGERY

## 2023-03-09 PROCEDURE — 63600175 PHARM REV CODE 636 W HCPCS: Performed by: SURGERY

## 2023-03-09 PROCEDURE — 71000015 HC POSTOP RECOV 1ST HR: Performed by: ORTHOPAEDIC SURGERY

## 2023-03-09 PROCEDURE — D9220A PRA ANESTHESIA: Mod: ANES,,, | Performed by: ANESTHESIOLOGY

## 2023-03-09 PROCEDURE — 86900 BLOOD TYPING SEROLOGIC ABO: CPT | Performed by: ORTHOPAEDIC SURGERY

## 2023-03-09 PROCEDURE — 96372 THER/PROPH/DIAG INJ SC/IM: CPT | Mod: 59 | Performed by: STUDENT IN AN ORGANIZED HEALTH CARE EDUCATION/TRAINING PROGRAM

## 2023-03-09 PROCEDURE — 64448 NJX AA&/STRD FEM NRV NFS IMG: CPT | Mod: 59,RT,, | Performed by: SURGERY

## 2023-03-09 PROCEDURE — 27201423 OPTIME MED/SURG SUP & DEVICES STERILE SUPPLY: Performed by: ORTHOPAEDIC SURGERY

## 2023-03-09 PROCEDURE — 64448 RIGHT ADDUCTOR CATH: ICD-10-PCS | Mod: 59,RT,, | Performed by: SURGERY

## 2023-03-09 PROCEDURE — 64448 NJX AA&/STRD FEM NRV NFS IMG: CPT | Performed by: STUDENT IN AN ORGANIZED HEALTH CARE EDUCATION/TRAINING PROGRAM

## 2023-03-09 PROCEDURE — 63600175 PHARM REV CODE 636 W HCPCS

## 2023-03-09 PROCEDURE — 82962 GLUCOSE BLOOD TEST: CPT | Performed by: ORTHOPAEDIC SURGERY

## 2023-03-09 PROCEDURE — 27447 PR TOTAL KNEE ARTHROPLASTY: ICD-10-PCS | Mod: RT,,, | Performed by: ORTHOPAEDIC SURGERY

## 2023-03-09 DEVICE — CEMENT BONE SMPLX HV GENTMYCN: Type: IMPLANTABLE DEVICE | Site: KNEE | Status: FUNCTIONAL

## 2023-03-09 DEVICE — FEMORAL POST STBLZD SZ D RIGH: Type: IMPLANTABLE DEVICE | Site: KNEE | Status: FUNCTIONAL

## 2023-03-09 DEVICE — ARITCULAR FLEX FIXED: Type: IMPLANTABLE DEVICE | Site: KNEE | Status: FUNCTIONAL

## 2023-03-09 DEVICE — TIBIAL NEXGEN PRECOAT STEM: Type: IMPLANTABLE DEVICE | Site: KNEE | Status: FUNCTIONAL

## 2023-03-09 DEVICE — PLUG TAPER: Type: IMPLANTABLE DEVICE | Site: KNEE | Status: FUNCTIONAL

## 2023-03-09 RX ORDER — SODIUM CHLORIDE 9 MG/ML
INJECTION, SOLUTION INTRAVENOUS CONTINUOUS PRN
Status: DISCONTINUED | OUTPATIENT
Start: 2023-03-09 | End: 2023-03-09

## 2023-03-09 RX ORDER — PROCHLORPERAZINE EDISYLATE 5 MG/ML
5 INJECTION INTRAMUSCULAR; INTRAVENOUS EVERY 30 MIN PRN
Status: DISCONTINUED | OUTPATIENT
Start: 2023-03-09 | End: 2023-03-09 | Stop reason: HOSPADM

## 2023-03-09 RX ORDER — PROPOFOL 10 MG/ML
VIAL (ML) INTRAVENOUS
Status: DISCONTINUED | OUTPATIENT
Start: 2023-03-09 | End: 2023-03-09

## 2023-03-09 RX ORDER — FUROSEMIDE 20 MG/1
20 TABLET ORAL DAILY
Status: DISCONTINUED | OUTPATIENT
Start: 2023-03-10 | End: 2023-03-12 | Stop reason: HOSPADM

## 2023-03-09 RX ORDER — NEOSTIGMINE METHYLSULFATE 0.5 MG/ML
INJECTION, SOLUTION INTRAVENOUS
Status: DISCONTINUED | OUTPATIENT
Start: 2023-03-09 | End: 2023-03-09

## 2023-03-09 RX ORDER — PHENYLEPHRINE HCL IN 0.9% NACL 1 MG/10 ML
SYRINGE (ML) INTRAVENOUS
Status: DISCONTINUED | OUTPATIENT
Start: 2023-03-09 | End: 2023-03-09

## 2023-03-09 RX ORDER — ROPIVACAINE HYDROCHLORIDE 5 MG/ML
INJECTION, SOLUTION EPIDURAL; INFILTRATION; PERINEURAL
Status: COMPLETED | OUTPATIENT
Start: 2023-03-09 | End: 2023-03-09

## 2023-03-09 RX ORDER — HALOPERIDOL 5 MG/ML
0.5 INJECTION INTRAMUSCULAR EVERY 10 MIN PRN
Status: DISCONTINUED | OUTPATIENT
Start: 2023-03-09 | End: 2023-03-09 | Stop reason: HOSPADM

## 2023-03-09 RX ORDER — HYDRALAZINE HYDROCHLORIDE 20 MG/ML
INJECTION INTRAMUSCULAR; INTRAVENOUS
Status: DISCONTINUED | OUTPATIENT
Start: 2023-03-09 | End: 2023-03-09

## 2023-03-09 RX ORDER — GLIPIZIDE 5 MG/1
5 TABLET ORAL
Status: DISCONTINUED | OUTPATIENT
Start: 2023-03-10 | End: 2023-03-09

## 2023-03-09 RX ORDER — ACETAMINOPHEN 500 MG
1000 TABLET ORAL
Status: DISCONTINUED | OUTPATIENT
Start: 2023-03-09 | End: 2023-03-09 | Stop reason: HOSPADM

## 2023-03-09 RX ORDER — SUCCINYLCHOLINE CHLORIDE 20 MG/ML
INJECTION INTRAMUSCULAR; INTRAVENOUS
Status: DISCONTINUED | OUTPATIENT
Start: 2023-03-09 | End: 2023-03-09

## 2023-03-09 RX ORDER — ONDANSETRON 2 MG/ML
INJECTION INTRAMUSCULAR; INTRAVENOUS
Status: DISCONTINUED | OUTPATIENT
Start: 2023-03-09 | End: 2023-03-09

## 2023-03-09 RX ORDER — NALOXONE HCL 0.4 MG/ML
0.02 VIAL (ML) INJECTION
Status: DISCONTINUED | OUTPATIENT
Start: 2023-03-09 | End: 2023-03-12 | Stop reason: HOSPADM

## 2023-03-09 RX ORDER — LOSARTAN POTASSIUM 50 MG/1
100 TABLET ORAL NIGHTLY
Status: DISCONTINUED | OUTPATIENT
Start: 2023-03-09 | End: 2023-03-12 | Stop reason: HOSPADM

## 2023-03-09 RX ORDER — FENTANYL CITRATE 50 UG/ML
INJECTION, SOLUTION INTRAMUSCULAR; INTRAVENOUS
Status: DISCONTINUED | OUTPATIENT
Start: 2023-03-09 | End: 2023-03-09

## 2023-03-09 RX ORDER — CARVEDILOL 25 MG/1
25 TABLET ORAL 2 TIMES DAILY WITH MEALS
Status: DISCONTINUED | OUTPATIENT
Start: 2023-03-10 | End: 2023-03-12 | Stop reason: HOSPADM

## 2023-03-09 RX ORDER — SODIUM CHLORIDE 9 MG/ML
INJECTION, SOLUTION INTRAVENOUS
Status: COMPLETED | OUTPATIENT
Start: 2023-03-09 | End: 2023-03-09

## 2023-03-09 RX ORDER — MORPHINE SULFATE 2 MG/ML
2 INJECTION, SOLUTION INTRAMUSCULAR; INTRAVENOUS
Status: DISCONTINUED | OUTPATIENT
Start: 2023-03-09 | End: 2023-03-10

## 2023-03-09 RX ORDER — BISACODYL 10 MG
10 SUPPOSITORY, RECTAL RECTAL EVERY 12 HOURS PRN
Status: DISCONTINUED | OUTPATIENT
Start: 2023-03-09 | End: 2023-03-12 | Stop reason: HOSPADM

## 2023-03-09 RX ORDER — METHOCARBAMOL 750 MG/1
750 TABLET, FILM COATED ORAL 3 TIMES DAILY
Status: DISCONTINUED | OUTPATIENT
Start: 2023-03-09 | End: 2023-03-12 | Stop reason: HOSPADM

## 2023-03-09 RX ORDER — AMOXICILLIN 250 MG
1 CAPSULE ORAL 2 TIMES DAILY
Status: DISCONTINUED | OUTPATIENT
Start: 2023-03-09 | End: 2023-03-12 | Stop reason: HOSPADM

## 2023-03-09 RX ORDER — SODIUM CHLORIDE 9 MG/ML
INJECTION, SOLUTION INTRAVENOUS CONTINUOUS
Status: ACTIVE | OUTPATIENT
Start: 2023-03-09 | End: 2023-03-10

## 2023-03-09 RX ORDER — TRANEXAMIC ACID 100 MG/ML
INJECTION, SOLUTION INTRAVENOUS
Status: DISCONTINUED | OUTPATIENT
Start: 2023-03-09 | End: 2023-03-09

## 2023-03-09 RX ORDER — ROPIVACAINE HYDROCHLORIDE 2 MG/ML
0.1 INJECTION, SOLUTION EPIDURAL; INFILTRATION; PERINEURAL CONTINUOUS
Status: DISCONTINUED | OUTPATIENT
Start: 2023-03-09 | End: 2023-03-12 | Stop reason: HOSPADM

## 2023-03-09 RX ORDER — ACETAMINOPHEN 500 MG
1000 TABLET ORAL EVERY 6 HOURS
Status: DISCONTINUED | OUTPATIENT
Start: 2023-03-09 | End: 2023-03-12

## 2023-03-09 RX ORDER — ATORVASTATIN CALCIUM 40 MG/1
80 TABLET, FILM COATED ORAL DAILY
Status: DISCONTINUED | OUTPATIENT
Start: 2023-03-10 | End: 2023-03-12 | Stop reason: HOSPADM

## 2023-03-09 RX ORDER — MIDAZOLAM HYDROCHLORIDE 1 MG/ML
.5-4 INJECTION INTRAMUSCULAR; INTRAVENOUS
Status: DISCONTINUED | OUTPATIENT
Start: 2023-03-09 | End: 2023-03-10

## 2023-03-09 RX ORDER — AMLODIPINE BESYLATE 10 MG/1
10 TABLET ORAL DAILY
Status: DISCONTINUED | OUTPATIENT
Start: 2023-03-10 | End: 2023-03-12 | Stop reason: HOSPADM

## 2023-03-09 RX ORDER — ASPIRIN 81 MG/1
81 TABLET ORAL 2 TIMES DAILY
Status: DISCONTINUED | OUTPATIENT
Start: 2023-03-09 | End: 2023-03-12 | Stop reason: HOSPADM

## 2023-03-09 RX ORDER — VANCOMYCIN HYDROCHLORIDE 1 G/20ML
INJECTION, POWDER, LYOPHILIZED, FOR SOLUTION INTRAVENOUS
Status: DISCONTINUED | OUTPATIENT
Start: 2023-03-09 | End: 2023-03-09 | Stop reason: HOSPADM

## 2023-03-09 RX ORDER — FENTANYL CITRATE 50 UG/ML
100 INJECTION, SOLUTION INTRAMUSCULAR; INTRAVENOUS
Status: DISCONTINUED | OUTPATIENT
Start: 2023-03-09 | End: 2023-03-09 | Stop reason: HOSPADM

## 2023-03-09 RX ORDER — FENTANYL CITRATE 50 UG/ML
25-200 INJECTION, SOLUTION INTRAMUSCULAR; INTRAVENOUS
Status: DISCONTINUED | OUTPATIENT
Start: 2023-03-09 | End: 2023-03-10

## 2023-03-09 RX ORDER — MUPIROCIN 20 MG/G
1 OINTMENT TOPICAL
Status: COMPLETED | OUTPATIENT
Start: 2023-03-09 | End: 2023-03-09

## 2023-03-09 RX ORDER — LIDOCAINE HCL/PF 100 MG/5ML
SYRINGE (ML) INTRAVENOUS
Status: DISCONTINUED | OUTPATIENT
Start: 2023-03-09 | End: 2023-03-09

## 2023-03-09 RX ORDER — TALC
6 POWDER (GRAM) TOPICAL NIGHTLY PRN
Status: DISCONTINUED | OUTPATIENT
Start: 2023-03-09 | End: 2023-03-09 | Stop reason: HOSPADM

## 2023-03-09 RX ORDER — PREGABALIN 75 MG/1
75 CAPSULE ORAL
Status: COMPLETED | OUTPATIENT
Start: 2023-03-09 | End: 2023-03-09

## 2023-03-09 RX ORDER — MIDAZOLAM HYDROCHLORIDE 1 MG/ML
4 INJECTION INTRAMUSCULAR; INTRAVENOUS
Status: DISCONTINUED | OUTPATIENT
Start: 2023-03-09 | End: 2023-03-09 | Stop reason: HOSPADM

## 2023-03-09 RX ORDER — ONDANSETRON 2 MG/ML
4 INJECTION INTRAMUSCULAR; INTRAVENOUS ONCE
Status: COMPLETED | OUTPATIENT
Start: 2023-03-09 | End: 2023-03-09

## 2023-03-09 RX ORDER — ONDANSETRON 2 MG/ML
4 INJECTION INTRAMUSCULAR; INTRAVENOUS EVERY 8 HOURS PRN
Status: DISCONTINUED | OUTPATIENT
Start: 2023-03-09 | End: 2023-03-10

## 2023-03-09 RX ORDER — PROCHLORPERAZINE EDISYLATE 5 MG/ML
5 INJECTION INTRAMUSCULAR; INTRAVENOUS EVERY 6 HOURS PRN
Status: DISCONTINUED | OUTPATIENT
Start: 2023-03-09 | End: 2023-03-12 | Stop reason: HOSPADM

## 2023-03-09 RX ORDER — HYDROMORPHONE HYDROCHLORIDE 1 MG/ML
0.2 INJECTION, SOLUTION INTRAMUSCULAR; INTRAVENOUS; SUBCUTANEOUS EVERY 5 MIN PRN
Status: DISCONTINUED | OUTPATIENT
Start: 2023-03-09 | End: 2023-03-09 | Stop reason: HOSPADM

## 2023-03-09 RX ORDER — SODIUM CHLORIDE 0.9 % (FLUSH) 0.9 %
10 SYRINGE (ML) INJECTION
Status: DISCONTINUED | OUTPATIENT
Start: 2023-03-09 | End: 2023-03-09 | Stop reason: HOSPADM

## 2023-03-09 RX ORDER — DEXAMETHASONE SODIUM PHOSPHATE 4 MG/ML
INJECTION, SOLUTION INTRA-ARTICULAR; INTRALESIONAL; INTRAMUSCULAR; INTRAVENOUS; SOFT TISSUE
Status: DISCONTINUED | OUTPATIENT
Start: 2023-03-09 | End: 2023-03-09

## 2023-03-09 RX ORDER — LIDOCAINE HYDROCHLORIDE 10 MG/ML
1 INJECTION, SOLUTION EPIDURAL; INFILTRATION; INTRACAUDAL; PERINEURAL
Status: DISCONTINUED | OUTPATIENT
Start: 2023-03-09 | End: 2023-03-09 | Stop reason: HOSPADM

## 2023-03-09 RX ORDER — ONDANSETRON 2 MG/ML
4 INJECTION INTRAMUSCULAR; INTRAVENOUS EVERY 12 HOURS PRN
Status: DISCONTINUED | OUTPATIENT
Start: 2023-03-09 | End: 2023-03-12 | Stop reason: HOSPADM

## 2023-03-09 RX ORDER — MUPIROCIN 20 MG/G
1 OINTMENT TOPICAL 2 TIMES DAILY
Status: DISCONTINUED | OUTPATIENT
Start: 2023-03-09 | End: 2023-03-12 | Stop reason: HOSPADM

## 2023-03-09 RX ORDER — PREGABALIN 75 MG/1
75 CAPSULE ORAL NIGHTLY
Status: DISCONTINUED | OUTPATIENT
Start: 2023-03-09 | End: 2023-03-12 | Stop reason: HOSPADM

## 2023-03-09 RX ORDER — FENTANYL CITRATE 50 UG/ML
25 INJECTION, SOLUTION INTRAMUSCULAR; INTRAVENOUS EVERY 5 MIN PRN
Status: DISCONTINUED | OUTPATIENT
Start: 2023-03-09 | End: 2023-03-09 | Stop reason: HOSPADM

## 2023-03-09 RX ORDER — OXYCODONE HYDROCHLORIDE 5 MG/1
5 TABLET ORAL
Status: DISCONTINUED | OUTPATIENT
Start: 2023-03-09 | End: 2023-03-12 | Stop reason: HOSPADM

## 2023-03-09 RX ORDER — ISOSORBIDE MONONITRATE 30 MG/1
60 TABLET, EXTENDED RELEASE ORAL DAILY
Status: DISCONTINUED | OUTPATIENT
Start: 2023-03-10 | End: 2023-03-12 | Stop reason: HOSPADM

## 2023-03-09 RX ORDER — KETAMINE HCL IN 0.9 % NACL 50 MG/5 ML
SYRINGE (ML) INTRAVENOUS
Status: DISCONTINUED | OUTPATIENT
Start: 2023-03-09 | End: 2023-03-09

## 2023-03-09 RX ORDER — CEFADROXIL 500 MG/1
500 CAPSULE ORAL EVERY 12 HOURS
Qty: 20 CAPSULE | Refills: 0 | Status: SHIPPED | OUTPATIENT
Start: 2023-03-09 | End: 2023-07-25

## 2023-03-09 RX ORDER — OXYCODONE HYDROCHLORIDE 10 MG/1
10 TABLET ORAL
Status: DISCONTINUED | OUTPATIENT
Start: 2023-03-09 | End: 2023-03-10

## 2023-03-09 RX ORDER — ROCURONIUM BROMIDE 10 MG/ML
INJECTION, SOLUTION INTRAVENOUS
Status: DISCONTINUED | OUTPATIENT
Start: 2023-03-09 | End: 2023-03-09

## 2023-03-09 RX ORDER — FAMOTIDINE 20 MG/1
20 TABLET, FILM COATED ORAL 2 TIMES DAILY
Status: DISCONTINUED | OUTPATIENT
Start: 2023-03-09 | End: 2023-03-12 | Stop reason: HOSPADM

## 2023-03-09 RX ORDER — POLYETHYLENE GLYCOL 3350 17 G/17G
17 POWDER, FOR SOLUTION ORAL DAILY
Status: DISCONTINUED | OUTPATIENT
Start: 2023-03-10 | End: 2023-03-12 | Stop reason: HOSPADM

## 2023-03-09 RX ADMIN — OXYCODONE HYDROCHLORIDE 5 MG: 5 TABLET ORAL at 05:03

## 2023-03-09 RX ADMIN — HYDRALAZINE HYDROCHLORIDE 5 MG: 20 INJECTION INTRAMUSCULAR; INTRAVENOUS at 03:03

## 2023-03-09 RX ADMIN — Medication 10 MG: at 03:03

## 2023-03-09 RX ADMIN — ROPIVACAINE HYDROCHLORIDE 0.1 ML/HR: 2 INJECTION, SOLUTION EPIDURAL; INFILTRATION at 05:03

## 2023-03-09 RX ADMIN — PREGABALIN 75 MG: 75 CAPSULE ORAL at 01:03

## 2023-03-09 RX ADMIN — SODIUM CHLORIDE: 9 INJECTION, SOLUTION INTRAVENOUS at 01:03

## 2023-03-09 RX ADMIN — GLYCOPYRROLATE 0.6 MG: 0.2 INJECTION, SOLUTION INTRAMUSCULAR; INTRAVENOUS at 04:03

## 2023-03-09 RX ADMIN — Medication 15 MG: at 03:03

## 2023-03-09 RX ADMIN — ONDANSETRON 4 MG: 2 INJECTION INTRAMUSCULAR; INTRAVENOUS at 04:03

## 2023-03-09 RX ADMIN — PROCHLORPERAZINE EDISYLATE 5 MG: 5 INJECTION INTRAMUSCULAR; INTRAVENOUS at 10:03

## 2023-03-09 RX ADMIN — PREGABALIN 75 MG: 75 CAPSULE ORAL at 08:03

## 2023-03-09 RX ADMIN — ACETAMINOPHEN 1000 MG: 325 TABLET ORAL at 05:03

## 2023-03-09 RX ADMIN — SODIUM CHLORIDE, SODIUM GLUCONATE, SODIUM ACETATE, POTASSIUM CHLORIDE, MAGNESIUM CHLORIDE, SODIUM PHOSPHATE, DIBASIC, AND POTASSIUM PHOSPHATE: .53; .5; .37; .037; .03; .012; .00082 INJECTION, SOLUTION INTRAVENOUS at 03:03

## 2023-03-09 RX ADMIN — ASPIRIN 81 MG: 81 TABLET, COATED ORAL at 08:03

## 2023-03-09 RX ADMIN — ROCURONIUM BROMIDE 10 MG: 10 INJECTION INTRAVENOUS at 02:03

## 2023-03-09 RX ADMIN — SUCCINYLCHOLINE CHLORIDE 140 MG: 20 INJECTION, SOLUTION INTRAMUSCULAR; INTRAVENOUS at 02:03

## 2023-03-09 RX ADMIN — VANCOMYCIN HYDROCHLORIDE 1500 MG: 1.5 INJECTION, POWDER, LYOPHILIZED, FOR SOLUTION INTRAVENOUS at 01:03

## 2023-03-09 RX ADMIN — SUGAMMADEX 200 MG: 100 INJECTION, SOLUTION INTRAVENOUS at 04:03

## 2023-03-09 RX ADMIN — TRANEXAMIC ACID 1000 MG: 100 INJECTION, SOLUTION INTRAVENOUS at 02:03

## 2023-03-09 RX ADMIN — CEFAZOLIN 2 G: 2 INJECTION, POWDER, FOR SOLUTION INTRAMUSCULAR; INTRAVENOUS at 02:03

## 2023-03-09 RX ADMIN — FENTANYL CITRATE 100 MCG: 50 INJECTION INTRAMUSCULAR; INTRAVENOUS at 01:03

## 2023-03-09 RX ADMIN — LIDOCAINE HYDROCHLORIDE 60 MG: 20 INJECTION, SOLUTION INTRAVENOUS at 02:03

## 2023-03-09 RX ADMIN — Medication 25 MG: at 02:03

## 2023-03-09 RX ADMIN — TRANEXAMIC ACID 1000 MG: 100 INJECTION, SOLUTION INTRAVENOUS at 03:03

## 2023-03-09 RX ADMIN — SENNOSIDES AND DOCUSATE SODIUM 1 TABLET: 50; 8.6 TABLET ORAL at 08:03

## 2023-03-09 RX ADMIN — SODIUM CHLORIDE: 9 INJECTION, SOLUTION INTRAVENOUS at 05:03

## 2023-03-09 RX ADMIN — Medication 50 MCG: at 02:03

## 2023-03-09 RX ADMIN — METHOCARBAMOL 750 MG: 750 TABLET ORAL at 08:03

## 2023-03-09 RX ADMIN — HYDROMORPHONE HYDROCHLORIDE 0.2 MG: 1 INJECTION, SOLUTION INTRAMUSCULAR; INTRAVENOUS; SUBCUTANEOUS at 05:03

## 2023-03-09 RX ADMIN — ONDANSETRON 4 MG: 2 INJECTION INTRAMUSCULAR; INTRAVENOUS at 01:03

## 2023-03-09 RX ADMIN — CEFAZOLIN 2 G: 2 INJECTION, POWDER, FOR SOLUTION INTRAMUSCULAR; INTRAVENOUS at 09:03

## 2023-03-09 RX ADMIN — INSULIN DETEMIR 12 UNITS: 100 INJECTION, SOLUTION SUBCUTANEOUS at 09:03

## 2023-03-09 RX ADMIN — OXYCODONE HYDROCHLORIDE 5 MG: 5 TABLET ORAL at 09:03

## 2023-03-09 RX ADMIN — MUPIROCIN 1 G: 20 OINTMENT TOPICAL at 01:03

## 2023-03-09 RX ADMIN — HALOPERIDOL LACTATE 0.5 MG: 5 INJECTION, SOLUTION INTRAMUSCULAR at 04:03

## 2023-03-09 RX ADMIN — ROPIVACAINE HYDROCHLORIDE 10 ML: 5 INJECTION EPIDURAL; INFILTRATION; PERINEURAL at 02:03

## 2023-03-09 RX ADMIN — FENTANYL CITRATE 100 MCG: 50 INJECTION, SOLUTION INTRAMUSCULAR; INTRAVENOUS at 02:03

## 2023-03-09 RX ADMIN — NEOSTIGMINE METHYLSULFATE 4 MG: 0.5 INJECTION, SOLUTION INTRAVENOUS at 04:03

## 2023-03-09 RX ADMIN — DEXAMETHASONE SODIUM PHOSPHATE 8 MG: 4 INJECTION INTRA-ARTICULAR; INTRALESIONAL; INTRAMUSCULAR; INTRAVENOUS; SOFT TISSUE at 02:03

## 2023-03-09 RX ADMIN — MUPIROCIN 1 G: 20 OINTMENT TOPICAL at 08:03

## 2023-03-09 RX ADMIN — FAMOTIDINE 20 MG: 20 TABLET ORAL at 08:03

## 2023-03-09 RX ADMIN — ROCURONIUM BROMIDE 40 MG: 10 INJECTION INTRAVENOUS at 02:03

## 2023-03-09 RX ADMIN — ACETAMINOPHEN 1000 MG: 325 TABLET ORAL at 09:03

## 2023-03-09 RX ADMIN — LOSARTAN POTASSIUM 100 MG: 50 TABLET, FILM COATED ORAL at 09:03

## 2023-03-09 RX ADMIN — PROPOFOL 170 MG: 10 INJECTION, EMULSION INTRAVENOUS at 02:03

## 2023-03-09 RX ADMIN — PROPOFOL 50 MG: 10 INJECTION, EMULSION INTRAVENOUS at 03:03

## 2023-03-09 RX ADMIN — SODIUM CHLORIDE: 0.9 INJECTION, SOLUTION INTRAVENOUS at 02:03

## 2023-03-09 NOTE — HOSPITAL COURSE
On 3/9/2023, the patient arrived to [facility] for proper pre-operative management.  Upon completion of pre-operative preparation, the patient was taken back to the operative theatre. R TKA was performed without complication and the patient was transported to the post anesthesia care unit in stable condition.  After appropriate recovery from the anaesthetic agents used during the surgery, the patient was then transported to the hospital inpatient floor.  The interim of the hospital stay from arrival on the floor up to discharge has been uncomplicated. The patient has tolerated regular diet.  The patient's pain has been controlled using a multimodal approach. Currently, the patient's pain is well controlled on an oral regimen.  The patient has been voiding without difficulty.  The patient began participation in physical therapy after surgery and has progressed throughout the entire hospital stay.  Currently, the patient's progress is sufficient to allow the them to be discharged to home safely.  The patient agrees with this assessment and desires a discharge today.

## 2023-03-09 NOTE — ANESTHESIA PROCEDURE NOTES
Intubation    Date/Time: 3/9/2023 2:28 PM  Performed by: Sinan Moses MD  Authorized by: Candice Calderon MD     Intubation:     Induction:  Rapid sequence induction    Intubated:  Postinduction    Mask Ventilation:  N/a    Attempts:  1    Attempted By:  Resident anesthesiologist    Method of Intubation:  Video laryngoscopy    Blade:  Branch 3    Laryngeal View Grade: Grade I - full view of cords      Difficult Airway Encountered?: No      Complications:  None    Airway Device:  Oral endotracheal tube    Airway Device Size:  7.0    Tube secured:  21    Secured at:  The teeth    Placement Verified By:  Capnometry    Complicating Factors:  None    Findings Post-Intubation:  BS equal bilateral and atraumatic/condition of teeth unchanged

## 2023-03-09 NOTE — PT/OT/SLP PROGRESS
Occupational Therapy      Patient Name:  Kaylee Romero   MRN:  [id number]    Patient not seen today secondary to pt not resolved s/p R TKA, pt actively recovering PACU. Will follow-up first thing tomorrow 3/10/23 in AM .    3/9/2023

## 2023-03-09 NOTE — OP NOTE
Ed[facility] Right TKA  OPERATIVE NOTE    Date of Operation:   3/9/2023    Providers Performing:   Surgeon:  Peterson Sharma MD  Assistant:  Wilian Romero MD    Operative Procedure:   Right Total Knee Arthroplasty, CPT 57536    Preoperative Diagnosis:   Right Knee Osteoarthritis, ICD-10 M17.11    Postoperative Diagnosis:   SAME    Components Used:       Implant Name Type Inv. Item Serial No.  Lot No. LRB No. Used Action   CEMENT BONE SMPLX HV GENTMYCN - [id number]  CEMENT BONE SMPLX HV GENTMYCN  [employer] [id number] Right 2 Implanted   TIBIAL NEXGEN PRECOAT STEM - [id number]  TIBIAL NEXGEN PRECOAT STEM  THELMA,INC [id number] Right 1 Implanted   PLUG TAPER - [id number]  PLUG TAPER  THELMA,INC [id number] Right 1 Implanted   ARITCULAR FLEX FIXED - [id number]  ARITCULAR FLEX FIXED  THELMA,INC [id number] Right 1 Implanted   FEMORAL POST STBLZD SZ D RIGH - [id number]  FEMORAL POST STBLZD SZ D RIGH  THELMA,INC [id number] Right 1 Implanted       Anesthesia:   General  Adductor canal catheter    Estimated Blood Loss:   200 cc    Specimens:   None    Complications:   None    Indications:   The patient has failed non-operative measures including medications, injections and activity modifications for their right knee.  After an explanation of risks and benefits of knee arthroplasty surgery, the patient wishes to proceed with surgery.    Operative Notes:   After the induction of satisfactory anesthesia, the patient's right lower extremity was prepped and draped in the usual sterile fashion with the tourniquet cuff in place. The extremity was exsanguinated with an Esmarch bandage, and the tourniquet inflated to 250 mmHg. An anterior midline incision was made, and the dissection was carried sharply through the subcutaneous tissues and prepatellar bursa layer which was reflected medially. A modified medial parapatellar arthrotomy was performed, and the patella was subluxated laterally. There was a large amount of clear  joint fluid. Most of the fat pad was excised, and the medial release was completed around to the mid-coronal point. A drill hole was made in the distal femur, and the canal was suctioned. The sword was placed in 5° of valgus. The distal femoral cut was made. The femur was sized, and the secondary femoral cuts were made in 3° external rotation. The knee was maximally flexed, and a drill hole was made in the footprint of the ACL. The canal was suctioned. An intramedullary alignment jn was placed with good purchase in the isthmus. The upper tibial cut was made perpendicular in both planes.  The flexion gap was checked after removing cruciate and meniscal remnants, and was found to be satisfactory with a spacer block once appropriate releases at the joint level were performed. Flexion and extension gaps were symmetric. The femoral finishing guide was used, and trials were inserted. We used a tibial baseplate template which gave us good coverage. The rotational position of it was marked with the Bovie after ranging the knee.  We had full extension without hyperextension, full flexion, good stability in both. The patella was not resurfaced as the bone was thin and osteoporotic, had nearly intact articular cartilage, had normal morphology, and tracked centrally in the anterior femoral flange.  Additionally, the patient is heavy, putting her at high risk for postoperative patellar remnant fracture, and she has a low activity level. The trials were removed, and the tibia was drilled and punched in line with the previous rotational elisha. The bone was cleaned with pulsatile lavage and dried thoroughly. The components were impacted in the usual fashion with Simplex HV plus gentamicin cement. Patellar tracking was central without lateral release. A lateral patellaplasty was performed. The knee was irrigated with Betadine and 3 liters of pulsatile lavage.  Vancomycin powder 1 g was placed in the knee joint. The arthrotomy was  repaired with running and interrupted figure-of-eight sutures of #1 Vicryl. The prepatellar bursa tissue was closed with interrupted 0 Vicryl. The skin was closed with interrupted, inverted 2-0 Vicryl, running 3-0 Monocryl, and Dermabond.  An Aquacel and Sadler dressing was applied, and the patient was taken to the PACU in stable condition without apparent orthopedic or anesthetic complications.      Sponge and needle counts were correct.    The first-assistant was critical to all steps of the operation, including retraction and leg stabilization during exposure and bone preparation, as well as the deep and superficial wound closure.  Dr. Sharma performed and/or supervised the key portions of this surgical procedure, including evaluation of the bone cuts, reshaping of the bony elements, and insertion of the provisional and final components.    Postoperative Plan:  The patient will be anticoagulated with 81mg aspirin twice daily for one month.   They will receive 24 hours of post-operative antibiotics, as well as extended cefadroxil prophylaxis protocol.    Activity will be weight bearing as tolerated.    Signed by: Peterson Sharma MD

## 2023-03-09 NOTE — HPI
CC:  Right knee pain     Kaylee Romero is a 78 y.o. female with history of Right knee pain. Pain is worse with activity and weight bearing.  Patient has experienced interference of activities of daily living due to decreased range of motion and an increase in joint pain and swelling.  Patient has failed non-operative treatment including NSAIDs, corticosteroid injections, viscosupplement injections, and activity modification.  Kaylee Romero currently ambulates using assistive device .      Relevant medical conditions of significance in perioperative period:  DM- on medication managed by pcp  HTN- on medication managed by pcp  CAD with history of CABG and stents  HLD- on medication managed by pcp

## 2023-03-09 NOTE — PT/OT/SLP PROGRESS
Physical Therapy      Patient Name:  Kaylee Romero   MRN:  [id number]    Patient not seen today secondary to  (pt not resolved. Will follow-up tomorrow first thing in the AM.)

## 2023-03-09 NOTE — Clinical Note
The anesthesia care team has confirmed the patient ID and re-evaluated the patient and anesthesia plan confirming it is suitable for the patient's condition and procedure.

## 2023-03-09 NOTE — H&P
CC:  Right knee pain     Kaylee Romero is a 78 y.o. female with history of Right knee pain. Pain is worse with activity and weight bearing.  Patient has experienced interference of activities of daily living due to decreased range of motion and an increase in joint pain and swelling.  Patient has failed non-operative treatment including NSAIDs, corticosteroid injections, viscosupplement injections, and activity modification.  Kaylee Romero currently ambulates using assistive device .      Relevant medical conditions of significance in perioperative period:  DM- on medication managed by pcp  HTN- on medication managed by pcp  CAD with history of CABG and stents  HLD- on medication managed by pcp             Past Medical History:   Diagnosis Date    Age-related osteoporosis without current pathological fracture 2019    Anemia      Cataract      CKD (chronic kidney disease) stage 3, GFR 30-59 ml/min      Coronary artery disease      Dry mouth      History of TIA (transient ischemic attack) 2018    Hyperlipidemia 2014    Hypertension      Hypertensive heart disease with diastolic heart failure 2012    Morbid obesity with body mass index (BMI) of 40.0 or higher 2016    KAMRAN (obstructive sleep apnea)      KAMRAN on CPAP 2016    Osteoporosis without current pathological fracture 2018    Physical deconditioning 2018    Status post total left knee replacement 2017    Type 2 diabetes mellitus with stage 3 chronic kidney disease, without long-term current use of insulin 2019               Past Surgical History:   Procedure Laterality Date    ankle surgery (l) Left      APPENDECTOMY         SECTION        CORONARY ARTERY BYPASS GRAFT   09/2019     x 2    CORONARY ARTERY BYPASS GRAFT (CABG) N/A 2019     Procedure: CORONARY ARTERY BYPASS GRAFT (CABG)X3;  Surgeon: Peterson Ventura MD;  Location: [facility] OR [address] [place] [address];  Service: Cardiovascular;   Laterality: N/A;    CORONARY BYPASS GRAFT ANGIOGRAPHY   10/18/2021     Procedure: Bypass graft study;  Surgeon: Jamar Haddad MD;  Location: [facility] CATH LAB;  Service: Cardiology;;    DILATION AND CURETTAGE OF UTERUS        ENDOSCOPIC HARVEST OF VEIN Left 08/12/2019     Procedure: SURGICAL PROCUREMENT, VEIN, ENDOSCOPIC;  Surgeon: Peterson Ventura MD;  [facility];  Service: Cardiovascular;  Laterality: Left;  Vein [place] Start: 0843; End: 1054   Vein Prep Start: 1055; End: 1145    JOINT REPLACEMENT Left       knee replacement    KNEE ARTHROSCOPY W/ DEBRIDEMENT        KNEE SURGERY Left 05/01/2017     TKR    LEFT HEART CATHETERIZATION Left 07/09/2019     Procedure: Left heart cath;  Surgeon: Ronak Gibbons MD;  Location: [facility] CATH LAB;  Service: Cardiology;  Laterality: Left;    LEFT HEART CATHETERIZATION Left 10/18/2021     Procedure: Left heart cath;  Surgeon: Jamar Haddad MD;  Location: [facility] CATH LAB;  Service: Cardiology;  Laterality: Left;               Family History   Problem Relation Age of Onset    Heart attack Mother      Heart disease Father      Stroke Father      Heart failure Father      Diabetes Father      Arrhythmia Father      No Known Problems Brother      Stroke Paternal Grandfather      No Known Problems Son      Breast cancer Neg Hx      Colon cancer Neg Hx      Ovarian cancer Neg Hx      Amblyopia Neg Hx      Blindness Neg Hx      Cancer Neg Hx      Cataracts Neg Hx      Glaucoma Neg Hx      Hypertension Neg Hx      Macular degeneration Neg Hx      Retinal detachment Neg Hx      Strabismus Neg Hx      Thyroid disease Neg Hx      Esophageal cancer Neg Hx                 Review of patient's allergies indicates:   Allergen Reactions    Bactrim [sulfamethoxazole-trimethoprim] Other (See Comments)       Generic version  Sulfa makes her sick    Keflex [cephalexin] Other (See Comments)       Turns orange            Current Outpatient Medications:     acetaminophen  "(TYLENOL) 500 MG tablet, Take 500 mg by mouth 2 (two) times daily as needed for Pain., Disp: , Rfl:     alendronate (FOSAMAX) 70 MG tablet, Take 1 tablet (70 mg total) by mouth every 7 days., Disp: 12 tablet, Rfl: 0    amLODIPine (NORVASC) 10 MG tablet, TAKE 1 TABLET(10 MG) BY MOUTH EVERY DAY, Disp: 90 tablet, Rfl: 2    ammonium lactate (LAC-HYDRIN) 12 % lotion, Apply topically as needed for Dry Skin. On the feet and toenails., Disp: 225 g, Rfl: 6    atorvastatin (LIPITOR) 80 MG tablet, TAKE 1 TABLET(80 MG) BY MOUTH EVERY DAY, Disp: 90 tablet, Rfl: 3    BD BOO 2ND GEN PEN NEEDLE 32 gauge x 5/32" Ndle, USE EVERY DAY, Disp: 100 each, Rfl: 8    blood sugar diagnostic Strp, 1 strip by Misc.(Non-Drug; Combo Route) route once daily., Disp: 100 strip, Rfl: 3    carvediloL (COREG) 25 MG tablet, TAKE 1 TABLET(25 MG) BY MOUTH TWICE DAILY WITH MEALS, Disp: 180 tablet, Rfl: 1    clopidogreL (PLAVIX) 75 mg tablet, TAKE 1 TABLET(75 MG) BY MOUTH EVERY DAY, Disp: 90 tablet, Rfl: 3    COD LIVER OIL ORAL, TAKE 2 CAPSULES BY MOUTH EVERY MORNING AND 1 CAPSULE EVERY EVENING, Disp: , Rfl:     diclofenac sodium (VOLTAREN) 1 % Gel, Apply 2 g topically 4 (four) times daily as needed. To painful area on the feet. (Patient taking differently: Apply 2 g topically 4 (four) times daily as needed. To painful area on the feet and knee), Disp: 500 g, Rfl: 5    diphenoxylate-atropine 2.5-0.025 mg (LOMOTIL) 2.5-0.025 mg per tablet, TAKE 1 TABLET BY MOUTH FOUR TIMES DAILY AS NEEDED FOR DIARRHEA, Disp: 30 tablet, Rfl: 30    dulaglutide (TRULICITY) 4.5 mg/0.5 mL pen injector, Inject 4.5 mg into the skin every 7 days. (Patient taking differently: Inject 4.5 mg into the skin every Sunday.), Disp: 2 mL, Rfl: 3    FEROSUL 325 mg (65 mg iron) Tab tablet, TAKE 1 TABLET BY MOUTH DAILY, Disp: 90 tablet, Rfl: 1    furosemide (LASIX) 20 MG tablet, Take 20 mg by mouth every Sat, Sun, Mon, Wed, & Fri, Disp: 30 tablet, Rfl: 3    glipiZIDE (GLUCOTROL) 5 MG tablet, " Take 2 tabs po every morning, 2 tab po every evening. (Patient taking differently: Take 2 tabs po every morning, 1 tab po every evening.), Disp: 90 tablet, Rfl: 5    glucagon (GVOKE HYPOPEN 2-PACK) 1 mg/0.2 mL AtIn, Inject 1 Package into the skin as needed., Disp: 2 Syringe, Rfl: 0    insulin glargine,hum.rec.anlog (BASAGLAR KWIKPEN U-100 INSULIN SUBQ), Inject 22 Units into the skin every evening., Disp: , Rfl:     irbesartan (AVAPRO) 300 MG tablet, TAKE 1 TABLET(300 MG) BY MOUTH EVERY EVENING, Disp: 90 tablet, Rfl: 2    isosorbide mononitrate (IMDUR) 30 MG 24 hr tablet, Take 2 tablets (60 mg total) by mouth once daily., Disp: 180 tablet, Rfl: 3    lancets Misc, 1 lancet by Misc.(Non-Drug; Combo Route) route once daily., Disp: 100 each, Rfl: 3    multivitamin (THERAGRAN) per tablet, Take 1 tablet by mouth once daily., Disp: , Rfl:     nitroGLYCERIN (NITROSTAT) 0.4 MG SL tablet, Place 1 tablet (0.4 mg total) under the tongue every 5 (five) minutes as needed for Chest pain., Disp: 25 tablet, Rfl: 6    vitamin D (VITAMIN D3) 1000 units Tab, Take 1,000 Units by mouth twice a week. Monday AND THURSDAYS ONLY, Disp: , Rfl:     aspirin 81 MG Chew, Take 1 tablet (81 mg total) by mouth once daily., Disp: , Rfl: 0     Current Facility-Administered Medications:     hyaluronate sodium, stabilized (MONOVISC) Syrg, , Intra-articular, 1 time in Clinic/HOD, Peterson Sharma MD     Review of Systems:  Constitutional: no fever or chills  Eyes: no visual changes  ENT: no nasal congestion or sore throat  Respiratory: no cough or shortness of breath  Cardiovascular: no chest pain or palpitations  Gastrointestinal: no nausea or vomiting, tolerating diet  Genitourinary: no hematuria or dysuria  Integument/Breast: no rash or pruritis  Hematologic/Lymphatic: no easy bruising or lymphadenopathy  Musculoskeletal: positive for knee pain  Neurological: no seizures or tremors  Behavioral/Psych: no auditory or visual hallucinations  Endocrine: no  "heat or cold intolerance     PE:  Ht 5' 2" (1.575 m)   Wt 107 kg (236 lb)   LMP 01/09/2001 (Approximate)   BMI 43.16 kg/m²   General: Pleasant, cooperative, NAD   Gait: antalgic  HEENT: NCAT, sclera nonicteric   Lungs: Respirations clear bilaterally; equal and unlabored.   CV: S1S2; 2+ bilateral upper and lower extremity pulses.   Skin: Intact throughout with no rashes, erythema, or lesions  Extremities: No LE edema,  no erythema or warmth of the skin in either lower extremity.     Right knee exam:  Knee Range of Motion:5-105, pain with passive range of motion  Effusion:none  Condition of skin:intact  Location of tenderness:Medial joint line   Strength:normal  Stability: stable to testing     Hip Examination: painless PROM of hip      Radiographs: Radiographs reveal advanced degenerative changes including subchondral cyst formation, subchondral sclerosis, osteophyte formation, joint space narrowing.      Knee Alignment: varus     Diagnosis: Primary osteoarthritis Right knee     Plan: Right total knee arthroplasty.   I explained to the patient that stiff knees make stiff knee replacements, and that they should not expect to achieve more flexion postoperatively than they have preoperatively.     Due to the serious nature of total joint infection and the high prevalence of community acquired MRSA, vancomycin will be used perioperatively.        "

## 2023-03-09 NOTE — TRANSFER OF CARE
Anesthesia Transfer of Care Note    Patient: Kaylee Romero    Procedure(s) Performed: Procedure(s) (LRB):  ARTHROPLASTY, KNEE, TOTAL:RIGHT (Right)    Patient location: [facility]    Anesthesia Type: general    Transport from OR: Transported from OR on 6-10 L/min O2 by face mask with adequate spontaneous ventilation    Post pain: adequate analgesia    Post assessment: no apparent anesthetic complications    Post vital signs: stable    Level of consciousness: awake    Nausea/Vomiting: no nausea/vomiting    Complications: none    Transfer of care protocol was followed      Last vitals:   Visit Vitals  /64 (BP Location: Left arm, Patient Position: Lying)   Pulse 79   Temp 36.4 °C (97.5 °F) (Temporal)   Resp 15   LMP 01/09/2001 (Approximate)   SpO2 97%   Breastfeeding No

## 2023-03-09 NOTE — ASSESSMENT & PLAN NOTE
Kaylee Romero is a 78 y.o. female is s/p R TKA on 3/9/2023    Surgical dressing C/D/I, bulky dressing taken down today, polar ice in place  Pain control: multimodal, Anesthesia Surgical Home following  PT/OT: WBAT RLE  DVT PPx: ASA 81 BID, FCDs at all times when not ambulating  Abx: postop Ancef + 10 days cefadroxil   Drain: none  Guevara: none    Dispo: plan to dc to home today once cleared by PT/OT

## 2023-03-09 NOTE — ANESTHESIA PROCEDURE NOTES
Right Adductor Cath    Patient location during procedure: pre-op   Block not for primary anesthetic.  Reason for block: at surgeon's request and post-op pain management   Post-op Pain Location: RLE   Start time: 3/9/2023 2:00 PM  Timeout: 3/9/2023 2:00 PM   End time: 3/9/2023 2:10 PM    Staffing  Authorizing Provider: Peterson Hernandez MD  Performing Provider: Percy Myers MD    Preanesthetic Checklist  Completed: patient identified, IV checked, site marked, risks and benefits discussed, surgical consent, monitors and equipment checked, pre-op evaluation and timeout performed  Peripheral Block  Patient position: supine  Prep: ChloraPrep  Patient monitoring: heart rate, cardiac monitor, continuous pulse ox, continuous capnometry and frequent blood pressure checks  Block type: adductor canal  Laterality: right  Injection technique: continuous  Needle  Needle type: Tuohy   Needle gauge: 17 G  Needle length: 3.5 in  Needle localization: ultrasound guidance and anatomical landmarks  Catheter type: spring wound  Catheter size: 19 G  Test dose: lidocaine 1.5% with Epi 1-to-200,000 and negative   -ultrasound image captured on disc.  Assessment  Injection assessment: negative aspiration, negative parasthesia and local visualized surrounding nerve  Paresthesia pain: none  Heart rate change: no  Slow fractionated injection: yes  Pain Tolerance: comfortable throughout block and no complaints  Medications:    Medications: ropivacaine (NAROPIN) injection 0.5% - Perineural   10 mL - 3/9/2023 2:10:00 PM    Additional Notes  VSS.  RN monitoring vitals throughout procedure.  Patient tolerated procedure well.  Block placed for surgical anesthesia   IV Sedation used and titrated for patient comfort-See MAR  Ultrasound guidance used to visualize the nerve bundle and sheath as well as confirm needle placement and deposition of the local anesthetic.  (1) Under ultrasound guidance, needle was inserted and placed in close proximity  to the nerve bundle  (2) Ultrasound was also used to visualize the spread of the anesthetic in close proximity to the brachial plexus  (3) The nerves appeared anatomically normal  (4) There were no apparent abnormal pathological findings    Ultrasound photos archived.  Pt tolerated procedure well. There were no immediate complications.

## 2023-03-10 LAB
POCT GLUCOSE: 159 MG/DL (ref 70–110)
POCT GLUCOSE: 186 MG/DL (ref 70–110)
POCT GLUCOSE: 195 MG/DL (ref 70–110)
POCT GLUCOSE: 198 MG/DL (ref 70–110)

## 2023-03-10 PROCEDURE — 25000003 PHARM REV CODE 250

## 2023-03-10 PROCEDURE — 99212 OFFICE O/P EST SF 10 MIN: CPT | Mod: ,,, | Performed by: ANESTHESIOLOGY

## 2023-03-10 PROCEDURE — 97161 PT EVAL LOW COMPLEX 20 MIN: CPT

## 2023-03-10 PROCEDURE — 94761 N-INVAS EAR/PLS OXIMETRY MLT: CPT

## 2023-03-10 PROCEDURE — 94799 UNLISTED PULMONARY SVC/PX: CPT

## 2023-03-10 PROCEDURE — 97165 OT EVAL LOW COMPLEX 30 MIN: CPT

## 2023-03-10 PROCEDURE — 94660 CPAP INITIATION&MGMT: CPT

## 2023-03-10 PROCEDURE — 27000221 HC OXYGEN, UP TO 24 HOURS

## 2023-03-10 PROCEDURE — 25000003 PHARM REV CODE 250: Performed by: NURSE PRACTITIONER

## 2023-03-10 PROCEDURE — 97116 GAIT TRAINING THERAPY: CPT

## 2023-03-10 PROCEDURE — 97530 THERAPEUTIC ACTIVITIES: CPT

## 2023-03-10 PROCEDURE — 99900035 HC TECH TIME PER 15 MIN (STAT)

## 2023-03-10 PROCEDURE — 97110 THERAPEUTIC EXERCISES: CPT

## 2023-03-10 PROCEDURE — 99212 PR OFFICE/OUTPT VISIT, EST, LEVL II, 10-19 MIN: ICD-10-PCS | Mod: ,,, | Performed by: ANESTHESIOLOGY

## 2023-03-10 PROCEDURE — 25000003 PHARM REV CODE 250: Performed by: STUDENT IN AN ORGANIZED HEALTH CARE EDUCATION/TRAINING PROGRAM

## 2023-03-10 PROCEDURE — 63600175 PHARM REV CODE 636 W HCPCS: Performed by: NURSE PRACTITIONER

## 2023-03-10 RX ORDER — GLUCAGON 1 MG
1 KIT INJECTION
Status: DISCONTINUED | OUTPATIENT
Start: 2023-03-10 | End: 2023-03-12 | Stop reason: HOSPADM

## 2023-03-10 RX ORDER — CEFADROXIL 500 MG/1
500 CAPSULE ORAL EVERY 12 HOURS
Status: DISCONTINUED | OUTPATIENT
Start: 2023-03-10 | End: 2023-03-12 | Stop reason: HOSPADM

## 2023-03-10 RX ORDER — INSULIN ASPART 100 [IU]/ML
0-5 INJECTION, SOLUTION INTRAVENOUS; SUBCUTANEOUS
Status: DISCONTINUED | OUTPATIENT
Start: 2023-03-10 | End: 2023-03-12 | Stop reason: HOSPADM

## 2023-03-10 RX ORDER — IBUPROFEN 200 MG
24 TABLET ORAL
Status: DISCONTINUED | OUTPATIENT
Start: 2023-03-10 | End: 2023-03-12 | Stop reason: HOSPADM

## 2023-03-10 RX ORDER — GLIPIZIDE 5 MG/1
5 TABLET ORAL
Status: DISCONTINUED | OUTPATIENT
Start: 2023-03-10 | End: 2023-03-12 | Stop reason: HOSPADM

## 2023-03-10 RX ORDER — IBUPROFEN 200 MG
16 TABLET ORAL
Status: DISCONTINUED | OUTPATIENT
Start: 2023-03-10 | End: 2023-03-12 | Stop reason: HOSPADM

## 2023-03-10 RX ADMIN — METHOCARBAMOL 750 MG: 750 TABLET ORAL at 09:03

## 2023-03-10 RX ADMIN — SENNOSIDES AND DOCUSATE SODIUM 1 TABLET: 50; 8.6 TABLET ORAL at 08:03

## 2023-03-10 RX ADMIN — ISOSORBIDE MONONITRATE 60 MG: 30 TABLET, EXTENDED RELEASE ORAL at 09:03

## 2023-03-10 RX ADMIN — PREGABALIN 75 MG: 75 CAPSULE ORAL at 08:03

## 2023-03-10 RX ADMIN — CARVEDILOL 25 MG: 25 TABLET, FILM COATED ORAL at 09:03

## 2023-03-10 RX ADMIN — ASPIRIN 81 MG: 81 TABLET, COATED ORAL at 09:03

## 2023-03-10 RX ADMIN — GLIPIZIDE 5 MG: 5 TABLET ORAL at 05:03

## 2023-03-10 RX ADMIN — METHOCARBAMOL 750 MG: 750 TABLET ORAL at 08:03

## 2023-03-10 RX ADMIN — POLYETHYLENE GLYCOL 3350 17 G: 17 POWDER, FOR SOLUTION ORAL at 09:03

## 2023-03-10 RX ADMIN — MUPIROCIN 1 G: 20 OINTMENT TOPICAL at 09:03

## 2023-03-10 RX ADMIN — LOSARTAN POTASSIUM 100 MG: 50 TABLET, FILM COATED ORAL at 08:03

## 2023-03-10 RX ADMIN — CEFAZOLIN 2 G: 2 INJECTION, POWDER, FOR SOLUTION INTRAMUSCULAR; INTRAVENOUS at 06:03

## 2023-03-10 RX ADMIN — FAMOTIDINE 20 MG: 20 TABLET ORAL at 09:03

## 2023-03-10 RX ADMIN — ASPIRIN 81 MG: 81 TABLET, COATED ORAL at 08:03

## 2023-03-10 RX ADMIN — CEFADROXIL 500 MG: 500 CAPSULE ORAL at 08:03

## 2023-03-10 RX ADMIN — ATORVASTATIN CALCIUM 80 MG: 40 TABLET, FILM COATED ORAL at 09:03

## 2023-03-10 RX ADMIN — METHOCARBAMOL 750 MG: 750 TABLET ORAL at 03:03

## 2023-03-10 RX ADMIN — ACETAMINOPHEN 1000 MG: 325 TABLET ORAL at 06:03

## 2023-03-10 RX ADMIN — CARVEDILOL 25 MG: 25 TABLET, FILM COATED ORAL at 05:03

## 2023-03-10 RX ADMIN — AMLODIPINE BESYLATE 10 MG: 10 TABLET ORAL at 09:03

## 2023-03-10 RX ADMIN — FAMOTIDINE 20 MG: 20 TABLET ORAL at 08:03

## 2023-03-10 RX ADMIN — ACETAMINOPHEN 1000 MG: 325 TABLET ORAL at 11:03

## 2023-03-10 RX ADMIN — INSULIN DETEMIR 12 UNITS: 100 INJECTION, SOLUTION SUBCUTANEOUS at 09:03

## 2023-03-10 NOTE — PLAN OF CARE
Patient tolerated PT session well. Patient ambulated 24ft with RW and contact guard assistance. No LOB or SOB noted. Patient has an OPPT appointment on 3/13/2023.       Problem: Physical Therapy  Goal: Physical Therapy Goal  Description: Goals to be met by: 4/10/2023    Patient will increase functional independence with mobility by performin. Supine to sit with supervision  2. Sit to stand transfer with Supervision  3. Gait x150 feet with Supervision using Rolling Walker  4. Ascend/Descend 4 steps with left handrail and contact guard assistance  5. Lower extremity exercise program x30 reps per handout, with supervision        Outcome: Ongoing, Progressing

## 2023-03-10 NOTE — ANESTHESIA POST-OP PAIN MANAGEMENT
Acute Pain Service and Perioperative Surgical Home Progress Note    HPI  Kaylee Romero is a 78 y.o., female,     Interval history    Surgery:  Procedure(s) (LRB):  ARTHROPLASTY, KNEE, TOTAL:RIGHT (Right)    Post Op Day #: 1    Catheter type: Perineural Adductor Canal    Infusion type: Ropivacaine 0.2%  7cc q3hr IB + 5cc q30min DB    Problem List:    Active Hospital Problems    Diagnosis  POA    *Primary osteoarthritis of right knee [M17.11]  Yes    Coronary 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 pectoris [I25.118]  Yes    Type 2 diabetes mellitus with stage 3 chronic kidney disease, without long-term current use of insulin [E11.22, N18.30]  Yes     Chronic    Physical deconditioning [R53.81]  Yes    KAMRAN on CPAP [G47.33, Z99.89]  Not Applicable    Dyslipidemia, goal LDL below 70 [E78.5]  Yes    Essential hypertension [I10]  Yes     Chronic      Resolved Hospital Problems   No resolved problems to display.       Subjective:     General appearance of alert, oriented, no complaints   Pain with rest: 1    Numbers   Pain with movement: 3    Numbers   Side Effects    1. Pruritis No    2. Nausea No    3. Motor Blockade No, 0=Ability to raise lower extremities off bed    4. Sedation No, 1=awake and alert    Schedule Medications:    acetaminophen  1,000 mg Oral Q6H    amLODIPine  10 mg Oral Daily    aspirin  81 mg Oral BID    atorvastatin  80 mg Oral Daily    carvediloL  25 mg Oral BID WM    famotidine  20 mg Oral BID    furosemide  20 mg Oral Daily    glipiZIDE  5 mg Oral with dinner    insulin detemir U-100  12 Units Subcutaneous QHS    isosorbide mononitrate  60 mg Oral Daily    losartan  100 mg Oral QHS    methocarbamoL  750 mg Oral TID    mupirocin  1 g Nasal BID    polyethylene glycol  17 g Oral Daily    pregabalin  75 mg Oral QHS    senna-docusate 8.6-50 mg  1 tablet Oral BID        Continuous Infusions:   sodium chloride 0.9% Stopped (03/10/23 0600)     ROPIvacaine (PF) 2 mg/ml (0.2%) 0.1 mL/hr (03/09/23 1719)        PRN Medications:  bisacodyL, dextrose 10%, dextrose 10%, glucagon (human recombinant), glucose, glucose, insulin aspart U-100, naloxone, ondansetron, oxyCODONE, prochlorperazine       Antibiotics:  Antibiotics (From admission, onward)    Start     Stop Route Frequency Ordered    03/09/23 2100  mupirocin 2 % ointment 1 g         03/14 2059 Nasl 2 times daily 03/09/23 1639           Objective:     Catheter site clean, dry, intact    Vital Signs (Most Recent):  Temp: 36.4 °C (97.6 °F) (03/10/23 0742)  Pulse: 84 (03/10/23 0742)  Resp: 18 (03/10/23 0742)  BP: 114/60 (03/10/23 0742)  SpO2: 95 % (03/10/23 0742) Vital Signs Range (Last 24H):  Temp:  [36.2 °C (97.2 °F)-36.6 °C (97.9 °F)]   Pulse:  [68-89]   Resp:  [9-20]   BP: ()/(48-73)   SpO2:  [95 %-100 %]      I & O (Last 24H):    Intake/Output Summary (Last 24 hours) at 3/10/2023 0751  Last data filed at 3/9/2023 2143  Gross per 24 hour   Intake 1900 ml   Output 300 ml   Net 1600 ml       Physical Exam:    GA: Alert, comfortable, no acute distress. Obese  Pulmonary: Decreased breath sounds bilaterally. Clear to auscultation A/P/L. No wheezing, crackles, or rhonchi.  Cardiac: RRR S1 & S2 w/o rubs/murmurs/gallops.   Abdominal:Bowel sounds present. No tenderness to palpation or distension. No appreciable hepatosplenomegaly.   Skin: PNC secured with no signs of surrounding erythema, swelling, or leakage. No jaundice, rashes, or visible lesions.    Laboratory:  CBC: No results for input(s): WBC, RBC, HGB, HCT, PLT, MCV, MCH, MCHC in the last 72 hours.    BMP: No results for input(s): NA, K, CO2, CL, BUN, CREATININE, GLU, MG, PHOS, CALCIUM in the last 72 hours.    No results for input(s): PT, INR, PROTIME, APTT in the last 72 hours.     Assessment:    Pain control adequate    Plan:    1. Pain:  Adductor canal perineural catheter in place and infusing. Good level.  Multimodal pain regimen with malcolm  acetaminophen 1g q6, pregabalin 75mg qHS, robaxin 750mg TID and PRN oxycodone 5mg  as indicated unless contraindicated.  Will continue to monitor. Plan [facility] with [facility] if patient discharged today otherwise continue with infusion inpatient.  2. Osteoarthritis right knee s/p arthroplasty  - see plan for pain  - encouraged use of incentive spirometer to prevent atelectasis s/p surgery  - rest of plan per ortho team  3. HTN  - continue home amlodipine, carvedilol, lasix, monoket, and losartan  4. Type 2 diabetes mellitus  - restart home hypoglycemics are lower doses and titrate as appropriate:   - detemir 12 units qhs   - glipizide 5mg with dinner  - LDSSI  - POCT glucose 4 times daily before meal and at bedtime  - hypoglycemia protocol in place  - diabetic diet  5. HLD  - continue home atorvastatin 80mg daily  6. KAMRAN  - cpap at home setting  7. CAD s/p CABG  - continue home atorvastatin 80mg daily  - aspirin 81mg bid  8. Physical deconditioning  - PT/OT  9. FEN/GI:  Tolerating liquids, advance diet as tolerated.  10. DVT prophylaxis: SCD, ASA 81 BID  11. Dispo:  Pt working well with PT/OT. Case management and SW for placement. Plan for discharge tomorrow if patient works well with PT and meets goals.      Thank you for allowing us to participate in this patient's care. We will continue to follow.        Anju Gillis DO  Anesthesiology Resident [email or web address]  [facility]

## 2023-03-10 NOTE — SUBJECTIVE & OBJECTIVE
"Principal Problem:Primary osteoarthritis of right knee    Principal Orthopedic Problem: Same    Interval History: DAYSI, patient stable, pain controlled. No acute complaints this morning.       Review of patient's allergies indicates:   Allergen Reactions    Bactrim [sulfamethoxazole-trimethoprim] Other (See Comments)     Generic version  Sulfa makes her sick    Keflex [cephalexin] Other (See Comments)     Turns orange       Current Facility-Administered Medications   Medication    0.9%  NaCl infusion    acetaminophen tablet 1,000 mg    amLODIPine tablet 10 mg    aspirin EC tablet 81 mg    atorvastatin tablet 80 mg    bisacodyL suppository 10 mg    carvediloL tablet 25 mg    ceFAZolin 2 g in dextrose 5 % in water (D5W) 5 % 50 mL IVPB (MB+)    famotidine tablet 20 mg    fentaNYL 50 mcg/mL injection  mcg    furosemide tablet 20 mg    insulin detemir U-100 pen 12 Units    isosorbide mononitrate 24 hr tablet 60 mg    losartan tablet 100 mg    methocarbamoL tablet 750 mg    midazolam (VERSED) 1 mg/mL injection 0.5-4 mg    morphine injection 2 mg    mupirocin 2 % ointment 1 g    naloxone 0.4 mg/mL injection 0.02 mg    ondansetron injection 4 mg    ondansetron injection 4 mg    oxyCODONE immediate release tablet 5 mg    oxyCODONE immediate release tablet Tab 10 mg    polyethylene glycol packet 17 g    pregabalin capsule 75 mg    prochlorperazine injection Soln 5 mg    ROPIvacaine (PF) 2 mg/ml (0.2%) solution    senna-docusate 8.6-50 mg per tablet 1 tablet     Objective:     Vital Signs (Most Recent):  Temp: 97.6 °F (36.4 °C) (03/10/23 0408)  Pulse: 86 (03/10/23 0408)  Resp: 18 (03/10/23 0408)  BP: 129/68 (03/10/23 0408)  SpO2: 97 % (03/10/23 0408) Vital Signs (24h Range):  Temp:  [97.2 °F (36.2 °C)-97.9 °F (36.6 °C)] 97.6 °F (36.4 °C)  Pulse:  [68-89] 86  Resp:  [9-20] 18  SpO2:  [95 %-100 %] 97 %  BP: ()/(48-73) 129/68     Weight: 106.6 kg (235 lb 0.2 oz)  Height: 5' 2" (157.5 cm)  Body mass index is 42.98 " kg/m².      Intake/Output Summary (Last 24 hours) at 3/10/2023 0612  Last data filed at 3/9/2023 2143  Gross per 24 hour   Intake 1900 ml   Output 300 ml   Net 1600 ml       Ortho/SPM Exam  AAOx4  NAD  Reg rate  No increased WOB    RLE:  Dressing c/d/i  Polar ice in place  SILT T/SP/DP/Andrade/Sa  Motor intact T/SP/DP  WWP extremities  FCDs in place and functioning      Significant Labs:   Recent Lab Results  (Last 5 results in the past 24 hours)        03/09/23  2349   03/09/23  2117   03/09/23  1642   03/09/23  1342   03/09/23  1331        Group & Rh         B NEG       INDIRECT LUZMARIA         NEG       POCT Glucose 179   160   148   104                              All pertinent labs within the past 24 hours have been reviewed.    Significant Imaging: I have reviewed and interpreted all pertinent imaging results/findings.

## 2023-03-10 NOTE — NURSING TRANSFER
Nursing Transfer Note      3/9/2023     Reason patient is being transferred: post op    Transfer To: \[facility]\"" [facility]    Transfer via bed    Transfer with cardiac monitoring, IV pole with NS gtt, sapphire pump with ropivicaine gtt    Transported by transporters x2    Medicines sent: n/a    Any special needs or follow-up needed: routine    Chart send with patient: Yes    Notified: son    Patient reassessed at: 03/09/2023 at 2100

## 2023-03-10 NOTE — PROGRESS NOTES
[facility] - [facility]  Orthopedics  Progress Note    Patient Name: Kaylee Romero  MRN: [id number]  Admission Date: 3/9/2023  Hospital Length of Stay: 0 days  Attending Provider: Peterson Sharma MD  Primary Care Provider: Liz Roberts MD  Follow-up For: Procedure(s) (LRB):  ARTHROPLASTY, KNEE, TOTAL:RIGHT (Right)    Post-Operative Day: 1 Day Post-Op  Subjective:     Principal Problem:Primary osteoarthritis of right knee    Principal Orthopedic Problem: Same    Interval History: NAEON, patient stable, pain controlled. No acute complaints this morning.       Review of patient's allergies indicates:   Allergen Reactions    Bactrim [sulfamethoxazole-trimethoprim] Other (See Comments)     Generic version  Sulfa makes her sick    Keflex [cephalexin] Other (See Comments)     Turns orange       Current Facility-Administered Medications   Medication    0.9%  NaCl infusion    acetaminophen tablet 1,000 mg    amLODIPine tablet 10 mg    aspirin EC tablet 81 mg    atorvastatin tablet 80 mg    bisacodyL suppository 10 mg    carvediloL tablet 25 mg    ceFAZolin 2 g in dextrose 5 % in water (D5W) 5 % 50 mL IVPB (MB+)    famotidine tablet 20 mg    fentaNYL 50 mcg/mL injection  mcg    furosemide tablet 20 mg    insulin detemir U-100 pen 12 Units    isosorbide mononitrate 24 hr tablet 60 mg    losartan tablet 100 mg    methocarbamoL tablet 750 mg    midazolam (VERSED) 1 mg/mL injection 0.5-4 mg    morphine injection 2 mg    mupirocin 2 % ointment 1 g    naloxone 0.4 mg/mL injection 0.02 mg    ondansetron injection 4 mg    ondansetron injection 4 mg    oxyCODONE immediate release tablet 5 mg    oxyCODONE immediate release tablet Tab 10 mg    polyethylene glycol packet 17 g    pregabalin capsule 75 mg    prochlorperazine injection Soln 5 mg    ROPIvacaine (PF) 2 mg/ml (0.2%) solution    senna-docusate 8.6-50 mg per tablet 1 tablet     Objective:     Vital Signs (Most Recent):  Temp: 97.6 °F  "(36.4 °C) (03/10/23 0408)  Pulse: 86 (03/10/23 0408)  Resp: 18 (03/10/23 0408)  BP: 129/68 (03/10/23 0408)  SpO2: 97 % (03/10/23 0408) Vital Signs (24h Range):  Temp:  [97.2 °F (36.2 °C)-97.9 °F (36.6 °C)] 97.6 °F (36.4 °C)  Pulse:  [68-89] 86  Resp:  [9-20] 18  SpO2:  [95 %-100 %] 97 %  BP: ()/(48-73) 129/68     Weight: 106.6 kg (235 lb 0.2 oz)  Height: 5' 2" (157.5 cm)  Body mass index is 42.98 kg/m².      Intake/Output Summary (Last 24 hours) at 3/10/2023 0612  Last data filed at 3/9/2023 2143  Gross per 24 hour   Intake 1900 ml   Output 300 ml   Net 1600 ml       Ortho/SPM Exam  AAOx4  NAD  Reg rate  No increased WOB    RLE:  Dressing c/d/i  Polar ice in place  SILT T/SP/DP/Andrade/Sa  Motor intact T/SP/DP  WWP extremities  FCDs in place and functioning      Significant Labs:   Recent Lab Results  (Last 5 results in the past 24 hours)        03/09/23  2349   03/09/23  2117   03/09/23  1642   03/09/23  1342   03/09/23  1331        Group & Rh         B NEG       INDIRECT LUZMARIA         NEG       POCT Glucose 179   160   148   104                              All pertinent labs within the past 24 hours have been reviewed.    Significant Imaging: I have reviewed and interpreted all pertinent imaging results/findings.    Assessment/Plan:     * Primary osteoarthritis of right knee  Kaylee Romero is a 78 y.o. female is s/p R TKA on 3/9/2023    Surgical dressing C/D/I, bulky dressing taken down today, polar ice in place  Pain control: multimodal, Anesthesia Surgical Home following  PT/OT: WBAT RLE  DVT PPx: ASA 81 BID, FCDs at all times when not ambulating  Abx: postop Ancef + 10 days cefadroxil   Drain: none  Guevara: none    Dispo: plan to dc likely tomorrow            Wilian Romero MD  Orthopedics  [facility] - Surgery  "

## 2023-03-10 NOTE — PLAN OF CARE
Evaluation completed, plan of care established.    Problem: Occupational Therapy  Goal: Occupational Therapy Goal  Description: Goals to be met by: 4/10/23     Patient will increase functional independence with ADLs by performing:    UE Dressing with Set-up Assistance.  LE Dressing with Supervision.  Grooming while standing at sink with Supervision.  Toileting from toilet with Supervision for hygiene and clothing management.   Sitting at edge of bed x10 minutes with Modified [place].  Supine to sit with Modified [place].  Step transfer with Supervision  Toilet transfer to toilet with Supervision.    Outcome: Ongoing, Progressing

## 2023-03-10 NOTE — PLAN OF CARE
Problem: Adult Inpatient Plan of Care  Goal: Plan of Care Review  Outcome: Ongoing, Progressing  Goal: Patient-Specific Goal (Individualized)  Outcome: Ongoing, Progressing  Goal: Absence of Hospital-Acquired Illness or Injury  Outcome: Ongoing, Progressing  Goal: Optimal Comfort and Wellbeing  Outcome: Ongoing, Progressing     Problem: Diabetes Comorbidity  Goal: Blood Glucose Level Within Targeted Range  Outcome: Ongoing, Progressing     Problem: Bariatric Environmental Safety  Goal: Safety Maintained with Care  Outcome: Ongoing, Progressing     Problem: Skin Injury Risk Increased  Goal: Skin Health and Integrity  Outcome: Ongoing, Progressing     Problem: Pain Acute  Goal: Acceptable Pain Control and Functional Ability  Outcome: Ongoing, Progressing

## 2023-03-10 NOTE — PT/OT/SLP EVAL
"Occupational Therapy   Evaluation    Name: Kaylee Romero  MRN: [id number]  Admitting Diagnosis: Primary osteoarthritis of right knee  Recent Surgery: Procedure(s) (LRB):  ARTHROPLASTY, KNEE, TOTAL:RIGHT (Right) 1 Day Post-Op    Recommendations:     Discharge Recommendations: outpatient PT  Discharge Equipment Recommendations:  bedside commode, walker, rolling  Barriers to discharge:  Other (Comment)    Assessment:     Kaylee Romero is a 78 y.o. female with a medical diagnosis of Primary osteoarthritis of right knee.  She presents with the following performance deficits affecting function: weakness, impaired self care skills, impaired functional mobility, gait instability, impaired balance, pain, decreased lower extremity function, impaired joint extensibility, impaired endurance, decreased ROM.      Rehab Prognosis: Good; patient would benefit from acute skilled OT services to address these deficits and reach maximum level of function.       Plan:     Patient to be seen daily to address the above listed problems via self-care/home management, therapeutic activities, therapeutic exercises, neuromuscular re-education  Plan of Care Expires: 04/10/23  Plan of Care Reviewed with: patient, other (see comments) (daughter-in-law)    Subjective     Chief Complaint: Pain in R knee with transfers  Patient/Family Comments/goals: Return to PLOF    Occupational Profile:  Living Environment: Pt lives with , son, and daughter-in-law, SSH, 4STE with L HR and brick wall on opposite side, t/s combo with bath bench  Previous level of function: Independent, intermittent use of SPC for HH mobility and wheelchair for very far community mobility  Roles and Routines: None stated  Equipment Used at Home: cane, quad, cane, straight, wheelchair, bath bench  Assistance upon Discharge: All family members in household    Pain/Comfort:  Pain Rating 1: other (see comments) (Pt did not rate numerically, stated "oooh I can feel it " Patient Education     Acute Sinusitis, Antibiotic Treatment (Child)   The sinuses are air-filled spaces in the skull. They are behind the forehead, in the nasal bones and cheeks, and around the eyes. When sinuses are healthy, air moves freely and mucus drains. When a child has a cold or an allergy, the lining of the nose and sinuses can become swollen. Mucus can become trapped. Bacteria may then multiply, causing bacterial sinusitis. This is also called a sinus infection.   Sinusitis often starts with a cold. Cold symptoms often go away in 5 or 10 days. If sinusitis develops, the symptoms continue and may even get worse. Thick, yellow-green mucus may drain from the nose. Your child may cough more. Your child may also have bad breath that doesn’t go away. Other symptoms may include pain or swelling in the face, sore throat, or headache.   The healthcare provider has prescribed antibiotics to treat the bacterial infection. Symptoms usually get better 2 to 3 days after your child starts the medicine.   Home care  Follow these guidelines when caring for your child at home:  · The healthcare provider has prescribed an oral antibiotic for your child. This is to help stop the infection. Follow all instructions for giving this medicine to your child. Make sure your child takes the medicine every day until it is gone. You should not have any left over. You may also be told to use saline nasal drops or a decongestant.  · If your child has pain, give him or her pain medicine as advised by your child’s provider. Don't give your child aspirin unless told to do so. Don't give your child any other medicine without first asking the provider.  · Give your child plenty of time to rest. Try to make your child as comfortable as possible. Some children may be distracted by quiet activities.  · Encourage your child to drink liquids. Toddlers or older children may prefer cold drinks, frozen desserts, or ice pops. They may also like warm  "now!")  Location - Side 1: Right  Location 1: knee  Pain Addressed 1: Reposition, Distraction, Pre-medicate for activity  Pain Rating Post-Intervention 1: other (see comments)    Patients cultural, spiritual, Muslim conflicts given the current situation: no    Objective:     Communicated with: Nursing prior to session.  Patient found supine with FCD, cryotherapy, peripheral IV, perineural catheter, PureWick upon OT entry to room.    General Precautions: Standard, fall  Orthopedic Precautions: RLE weight bearing as tolerated  Braces: N/A  Respiratory Status: Room air    Occupational Performance:    Bed Mobility:    Patient completed Rolling/Turning to Right with contact guard assistance  Patient completed Scooting/Bridging with contact guard assistance  Patient completed Supine to Sit with contact guard assistance    Functional Mobility/Transfers:  Patient completed Sit <> Stand Transfer with contact guard assistance  with  rolling walker   Patient completed Bed <> Chair Transfer using Step Transfer technique with contact guard assistance with rolling walker  Functional Mobility: CGA with rolling walker for ~half of HH distance (24 ft) within room with increased time for patient to improve step sequenc    Activities of Daily Living:  Grooming: stand by assistance seated EOB  Upper Body Dressing: minimum assistance seated edge of bed   Lower Body Dressing: maximal assistance prior to transfer  Toileting: minimum assistance during christin-care for stabilization/dynamic standing balance in stand with FF trunk and alternating UE support    Cognitive/Visual Perceptual:  Cognitive/Psychosocial Skills:     -       Oriented to: Person, Place, Time, and Situation   -       Follows Commands/attention:Follows multistep  commands  -       Safety awareness/insight to disability: intact   -       Mood/Affect/Coping skills/emotional control: Appropriate to situation    Physical Exam:  BUE's - strength and ROM - " chicken soup or beverages with lemon and honey. Don't give honey to children younger than 1 year old.  · Use a cool-mist humidifier in your child’s bedroom to make breathing easier, especially at night or if the air in your house is dry. Clean and dry the humidifier to keep bacteria and mold from growing. Don’t use using a hot water vaporizer. It can cause burns.  · Don’t smoke around your child. Tobacco smoke can make your child’s symptoms worse.  · 'Don't use antihistamines with acute sinusitis. They can keep fluid from draining from the sinuses.  · Sinus infection are not contagious. Your child can return to school if they don't have a fever and feel up to it.  Follow-up care  Follow up with your child’s healthcare provider, or as directed.  When to seek medical advice  Call your child's healthcare provider right away if your child has any of these:   · Fever (see Fever and children, below)  · Fever that does not improve after starting antibiotics  · Swelling or redness around eyes that lasts all day, not just in the morning  · Vomiting that continues  · Sensitivity to light  · Irritability that gets worse  · Sudden pain in face or head  · Double vision  · Stiff neck  · Symptoms not going away in 10 days  Call 911  Call 911 or seek immediate medical care if any of the following occur:   · Problems breathing or shortness of breath  · Not acting right or not thinking clearly  · Severe pain in face or head, not relieved by pain medication or as directed by the doctor    Fever and children  Use a digital thermometer to check your child’s temperature. Don’t use a mercury thermometer. There are different kinds and uses of digital thermometers. They include:   · Rectal. For children younger than 3 years, a rectal temperature is the most accurate.  · Forehead (temporal). This works for children age 3 months and older. If a child under 3 months old has signs of illness, this can be used for a first pass. The provider may  WFL's  Coordination/sensation - intact    AMPA 6 Click ADL:  AMPAC Total Score: 14    Treatment & Education:  -Evaluation completed, plan of care established.  -Patient and family educated on roles/goals of OT and POC.  -White board updated.  -Therapist provided time for questions and answered within scope of practice.  -Patient educated on importance of EOB/OOB activity to maximize recovery.     Patient left up in chair with all lines intact, call button in reach, nursing notified, and patient's DIL present    GOALS:   Multidisciplinary Problems       Occupational Therapy Goals          Problem: Occupational Therapy    Goal Priority Disciplines Outcome Interventions   Occupational Therapy Goal     OT, PT/OT Ongoing, Progressing    Description: Goals to be met by: 4/10/23     Patient will increase functional independence with ADLs by performing:    UE Dressing with Set-up Assistance.  LE Dressing with Supervision.  Grooming while standing at sink with Supervision.  Toileting from toilet with Supervision for hygiene and clothing management.   Sitting at edge of bed x10 minutes with Modified [place].  Supine to sit with Modified [place].  Step transfer with Supervision  Toilet transfer to toilet with Supervision.                         History:     Past Medical History:   Diagnosis Date    Age-related osteoporosis without current pathological fracture 01/11/2019    Anemia     Cataract     CKD (chronic kidney disease) stage 3, GFR 30-59 ml/min     Coronary artery disease     Dry mouth     History of TIA (transient ischemic attack) 12/11/2018    Hyperlipidemia 12/02/2014    Hypertension     Hypertensive heart disease with diastolic heart failure 11/28/2012    Morbid obesity with body mass index (BMI) of 40.0 or higher 05/09/2016    KAMRAN (obstructive sleep apnea)     KAMRAN on CPAP 06/28/2016    Osteoporosis without current pathological fracture 11/11/2018    Physical deconditioning 11/05/2018    Status post  want to confirm with a rectal temperature.  · Ear (tympanic). Ear temperatures are accurate after 6 months of age, but not before.  · Armpit (axillary). This is the least reliable but may be used for a first pass to check a child of any age with signs of illness. The provider may want to confirm with a rectal temperature.  · Mouth (oral). Don’t use a thermometer in your child’s mouth until he or she is at least 4 years old.  Use the rectal thermometer with care. Follow the product maker’s directions for correct use. Insert it gently. Label it and make sure it’s not used in the mouth. It may pass on germs from the stool. If you don’t feel OK using a rectal thermometer, ask the healthcare provider what type to use instead. When you talk with any healthcare provider about your child’s fever, tell him or her which type you used.   Below are guidelines to know if your young child has a fever. Your child’s healthcare provider may give you different numbers for your child. Follow your provider’s specific instructions.   Fever readings for a baby under 3 months old:   · First, ask your child’s healthcare provider how you should take the temperature.  · Rectal or forehead: 100.4°F (38°C) or higher  · Armpit: 99°F (37.2°C) or higher  Fever readings for a child age 3 months to 36 months (3 years):   · Rectal, forehead, or ear: 102°F (38.9°C) or higher  · Armpit: 101°F (38.3°C) or higher  Call the healthcare provider in these cases:   · Repeated temperature of 104°F (40°C) or higher in a child of any age  · Fever of 100.4° F (38° C) or higher in baby younger than 3 months  · Fever that lasts more than 24 hours in a child under age 2  · Fever that lasts for 3 days in a child age 2 or older    [employer] last reviewed this educational content on 2020 © 2000-2021 [employer]. All rights reserved. This information is not intended as a substitute for professional medical care. Always follow your healthcare  professional's instructions.            total left knee replacement 2017    Type 2 diabetes mellitus with stage 3 chronic kidney disease, without long-term current use of insulin 2019         Past Surgical History:   Procedure Laterality Date    ankle surgery (l) Left     APPENDECTOMY       SECTION      CORONARY ARTERY BYPASS GRAFT  09/2019    x 2    CORONARY ARTERY BYPASS GRAFT (CABG) N/A 2019    Procedure: CORONARY ARTERY BYPASS GRAFT (CABG)X3;  Surgeon: Peterson Ventura MD;  Location: [address];  Service: Cardiovascular;  Laterality: N/A;    CORONARY BYPASS GRAFT ANGIOGRAPHY  10/18/2021    Procedure: Bypass graft study;  Surgeon: Jamar Haddad MD;  Location: [facility] CATH LAB;  Service: Cardiology;;    DILATION AND CURETTAGE OF UTERUS      ENDOSCOPIC HARVEST OF VEIN Left 2019    Procedure: SURGICAL PROCUREMENT, VEIN, ENDOSCOPIC;  Surgeon: Peterson Ventura MD;  Location: [address];  Service: Cardiovascular;  Laterality: Left;  Vein [place] Start: 0843; End: 1054   Vein Prep Start: 1055; End: 1145    JOINT REPLACEMENT Left     knee replacement    KNEE ARTHROSCOPY W/ DEBRIDEMENT      KNEE SURGERY Left 2017    TKR    LEFT HEART CATHETERIZATION Left 2019    Procedure: Left heart cath;  Surgeon: Ronak Gibbons MD;  Location: [facility] CATH LAB;  Service: Cardiology;  Laterality: Left;    LEFT HEART CATHETERIZATION Left 10/18/2021    Procedure: Left heart cath;  Surgeon: Jamar Haddad MD;  Location: [facility] CATH LAB;  Service: Cardiology;  Laterality: Left;    TOTAL KNEE ARTHROPLASTY Right 3/9/2023    Procedure: ARTHROPLASTY, KNEE, TOTAL:RIGHT;  Surgeon: Peterson Sharma MD;  Location: [address];  Service: Orthopedics;  Laterality: Right;       Time Tracking:     OT Date of Treatment: 03/10/23  OT Start Time: 914  OT Stop Time: 950  OT Total Time (min): 36 min    Billable Minutes:Evaluation 10  Self Care/Home Management 16  Therapeutic Activity 20    3/10/2023

## 2023-03-10 NOTE — PROGRESS NOTES
MD Nick with anesthesia made aware that patient has not yet urinated while in PACU. Pt is on a purewick, has been encouraged to pee. States she feels like she can, but that it is stuck. AARON RN made aware in report. VSS, WCTM.    2036 pt urinated 100cc of clear, yellow urine via purewick.

## 2023-03-10 NOTE — ANESTHESIA POSTPROCEDURE EVALUATION
Anesthesia Post Evaluation    Patient: Kaylee Romero    Procedure(s) Performed: Procedure(s) (LRB):  ARTHROPLASTY, KNEE, TOTAL:RIGHT (Right)          Patient location during evaluation: floor                    Vitals Value Taken Time   /60 03/10/23 0742   Temp 36.4 °C (97.6 °F) 03/10/23 0742   Pulse 84 03/10/23 0742   Resp 18 03/10/23 0742   SpO2 95 % 03/10/23 0742         Event Time   Out of Recovery 17:00:00         Pain/Edwin Score: Pain Rating Prior to Med Admin: 6 (3/10/2023  6:49 AM)  Pain Rating Post Med Admin: 5 (3/9/2023 10:43 PM)  Edwin Score: 9 (3/9/2023  5:00 PM)

## 2023-03-10 NOTE — ANESTHESIA POSTPROCEDURE EVALUATION
Anesthesia Post Evaluation    Patient: Kaylee Romero    Procedure(s) Performed: Procedure(s) (LRB):  ARTHROPLASTY, KNEE, TOTAL:RIGHT (Right)    Final Anesthesia Type: general      Patient location during evaluation: floor  Patient participation: Yes- Able to Participate  Level of consciousness: awake and alert and oriented  Post-procedure vital signs: reviewed and stable  Pain management: adequate  Airway patency: patent    PONV status at discharge: No PONV  Anesthetic complications: no      Cardiovascular status: hemodynamically stable  Respiratory status: unassisted  Hydration status: euvolemic  Follow-up not needed.          Vitals Value Taken Time   /60 03/10/23 0742   Temp 36.4 °C (97.6 °F) 03/10/23 0742   Pulse 84 03/10/23 0742   Resp 18 03/10/23 0742   SpO2 95 % 03/10/23 0742         Event Time   Out of Recovery 17:00:00         Pain/Edwin Score: Pain Rating Prior to Med Admin: 6 (3/10/2023  6:49 AM)  Pain Rating Post Med Admin: 5 (3/9/2023 10:43 PM)  Edwin Score: 9 (3/9/2023  5:00 PM)

## 2023-03-10 NOTE — PT/OT/SLP EVAL
Physical Therapy Evaluation and Treatment     Patient Name:  Kaylee Romero   MRN:  [id number]    Recommendations:     Discharge Recommendations: outpatient PT   Discharge Equipment Recommendations: bedside commode, walker, rolling   Barriers to discharge: Decreased caregiver support    Assessment:     Kaylee Romero is a 78 y.o. female admitted with a medical diagnosis of Primary osteoarthritis of right knee.  She presents with the following impairments/functional limitations: weakness, impaired functional mobility, gait instability, impaired balance, decreased lower extremity function, pain, decreased ROM, orthopedic precautions. Patient tolerated PT session well. Patient ambulated 24ft with RW and contact guard assistance. No LOB or SOB noted. Patient has an OPPT appointment on 3/13/2023.     Rehab Prognosis: Good; patient would benefit from acute skilled PT services to address these deficits and reach maximum level of function.    Recent Surgery: Procedure(s) (LRB):  ARTHROPLASTY, KNEE, TOTAL:RIGHT (Right) 1 Day Post-Op    Plan:     During this hospitalization, patient to be seen daily to address the identified rehab impairments via gait training, therapeutic activities, therapeutic exercises and progress toward the following goals:    Plan of Care Expires:  04/10/23    Subjective     Chief Complaint: Right knee pain.   Patient/Family Comments/goals: To walk without pain.   Pain/Comfort:  Pain Rating 1:  (yes but did not rate with number)  Location - Side 1: Right  Location 1: knee  Pain Addressed 1: Pre-medicate for activity, Reposition, Distraction    Patients cultural, spiritual, Rastafari conflicts given the current situation:  n/a    Living Environment:  Patient lives with her , daughter in law, and son [facility] with 4 steps and a left handrail and brick wall on the right side to enter. Prior to admission, patients level of function was modified independent with straight cane. Equipment at  home: cane, quad, cane, straight, wheelchair, bath bench. Upon discharge, patient will have assistance from , son, and daughter in law.    Objective:     Communicated with RN prior to session.  Patient found supine with PureWick, perineural catheter, peripheral IV, FCD, cryotherapy upon PT entry to room. Daughter in law present in room.     General Precautions: Standard, fall  Orthopedic Precautions:RLE weight bearing as tolerated   Braces: N/A  Respiratory Status: Room air    Exams:  Cognitive Exam:  Patient is oriented to Person, Place, Time, and Situation  Gross Motor Coordination:  WFL  Sensation:    -       Intact  RLE ROM: WFL except limited at knee due to pain  RLE Strength: appears WFL but did not formally assess due to pain  LLE ROM: WFL  LLE Strength: WFL    Functional Mobility:  Bed Mobility:     Scooting: contact guard assistance  Supine to Sit: contact guard assistance  Transfers:     Sit to Stand:  contact guard assistance with rolling walker x1 from bed and x1 from bedside commode  Gait: Patient ambulated 24ft with Rolling Walker and contact guard assistance using 3-point gait. Patient demonstrated decreased chantell and decreased step length during gait due to pain, decreased ROM, and decreased strength.      AM-PAC 6 CLICK MOBILITY  Total Score:17       Treatment & Education:  PT went back in PM to perform R LE therex. Patient educated in and performed R LE exercises x10 reps for ankle pumps, quad set, glute set, SAQ over bolster, heel slides, hip abd/add, SLR, and LAQ.    Patient and daughter in law educated in:  -PT role and POC  -safety with transfers including hand placement  -gait sequencing and RW management  -OOB activity to maximize recovery including ambulating with nursing staff assistance and RW while in the hospital   -HEP for therex at home with handout provided       Patient left up in chair with all lines intact, call button in reach, RN notified, and daughter in law  present.    GOALS:   Multidisciplinary Problems       Physical Therapy Goals          Problem: Physical Therapy    Goal Priority Disciplines Outcome Goal Variances Interventions   Physical Therapy Goal     PT, PT/OT Ongoing, Progressing     Description: Goals to be met by: 4/10/2023    Patient will increase functional independence with mobility by performin. Supine to sit with supervision  2. Sit to stand transfer with Supervision  3. Gait x150 feet with Supervision using Rolling Walker  4. Ascend/Descend 4 steps with left handrail and contact guard assistance  5. Lower extremity exercise program x30 reps per handout, with supervision                             History:     Past Medical History:   Diagnosis Date    Age-related osteoporosis without current pathological fracture 2019    Anemia     Cataract     CKD (chronic kidney disease) stage 3, GFR 30-59 ml/min     Coronary artery disease     Dry mouth     History of TIA (transient ischemic attack) 2018    Hyperlipidemia 2014    Hypertension     Hypertensive heart disease with diastolic heart failure 2012    Morbid obesity with body mass index (BMI) of 40.0 or higher 2016    KAMRAN (obstructive sleep apnea)     KAMRAN on CPAP 2016    Osteoporosis without current pathological fracture 2018    Physical deconditioning 2018    Status post total left knee replacement 2017    Type 2 diabetes mellitus with stage 3 chronic kidney disease, without long-term current use of insulin 2019       Past Surgical History:   Procedure Laterality Date    ankle surgery (l) Left     APPENDECTOMY       SECTION      CORONARY ARTERY BYPASS GRAFT  09/2019    x 2    CORONARY ARTERY BYPASS GRAFT (CABG) N/A 2019    Procedure: CORONARY ARTERY BYPASS GRAFT (CABG)X3;  Surgeon: Peterson Ventura MD;  Location: [facility] OR [address] [place] [address];  Service: Cardiovascular;  Laterality: N/A;    CORONARY BYPASS GRAFT ANGIOGRAPHY   10/18/2021    Procedure: Bypass graft study;  Surgeon: Jamar Haddad MD;  Location: [facility] CATH LAB;  Service: Cardiology;;    DILATION AND CURETTAGE OF UTERUS      ENDOSCOPIC HARVEST OF VEIN Left 08/12/2019    Procedure: SURGICAL PROCUREMENT, VEIN, ENDOSCOPIC;  Surgeon: Peterson Ventura MD;  Location: [facility] OR [address] [place] [address];  Service: Cardiovascular;  Laterality: Left;  Vein [place] Start: 0843; End: 1054   Vein Prep Start: 1055; End: 1145    JOINT REPLACEMENT Left     knee replacement    KNEE ARTHROSCOPY W/ DEBRIDEMENT      KNEE SURGERY Left 05/01/2017    TKR    LEFT HEART CATHETERIZATION Left 07/09/2019    Procedure: Left heart cath;  Surgeon: Ronak Gibbons MD;  Location: [facility] CATH LAB;  Service: Cardiology;  Laterality: Left;    LEFT HEART CATHETERIZATION Left 10/18/2021    Procedure: Left heart cath;  Surgeon: Jamar Haddad MD;  Location: [facility] CATH LAB;  Service: Cardiology;  Laterality: Left;    TOTAL KNEE ARTHROPLASTY Right 3/9/2023    Procedure: ARTHROPLASTY, KNEE, TOTAL:RIGHT;  Surgeon: Peterson Sharma MD;  Location: [address];  Service: Orthopedics;  Laterality: Right;       Time Tracking:     PT Received On: 03/10/23  PT Start Time: 0915 (1249)     PT Stop Time: 0950 (1304)  PT Total Time (min): 35 min and 15 min    Billable Minutes: Evaluation 10, Gait Training 15, Therapeutic Exercise 15, and Therapeutic Activity 10    03/10/2023

## 2023-03-11 LAB
POCT GLUCOSE: 151 MG/DL (ref 70–110)
POCT GLUCOSE: 200 MG/DL (ref 70–110)
POCT GLUCOSE: 204 MG/DL (ref 70–110)

## 2023-03-11 PROCEDURE — 94660 CPAP INITIATION&MGMT: CPT

## 2023-03-11 PROCEDURE — 97530 THERAPEUTIC ACTIVITIES: CPT | Mod: CQ

## 2023-03-11 PROCEDURE — 27000221 HC OXYGEN, UP TO 24 HOURS

## 2023-03-11 PROCEDURE — 94761 N-INVAS EAR/PLS OXIMETRY MLT: CPT

## 2023-03-11 PROCEDURE — 99232 PR SUBSEQUENT HOSPITAL CARE,LEVL II: ICD-10-PCS | Mod: ,,, | Performed by: ANESTHESIOLOGY

## 2023-03-11 PROCEDURE — 25000003 PHARM REV CODE 250

## 2023-03-11 PROCEDURE — 63600175 PHARM REV CODE 636 W HCPCS: Performed by: STUDENT IN AN ORGANIZED HEALTH CARE EDUCATION/TRAINING PROGRAM

## 2023-03-11 PROCEDURE — 25000003 PHARM REV CODE 250: Performed by: NURSE PRACTITIONER

## 2023-03-11 PROCEDURE — 97116 GAIT TRAINING THERAPY: CPT | Mod: CQ

## 2023-03-11 PROCEDURE — 99232 SBSQ HOSP IP/OBS MODERATE 35: CPT | Mod: ,,, | Performed by: ANESTHESIOLOGY

## 2023-03-11 PROCEDURE — 25000003 PHARM REV CODE 250: Performed by: STUDENT IN AN ORGANIZED HEALTH CARE EDUCATION/TRAINING PROGRAM

## 2023-03-11 PROCEDURE — 97535 SELF CARE MNGMENT TRAINING: CPT | Mod: CO

## 2023-03-11 RX ORDER — ASPIRIN 81 MG/1
81 TABLET ORAL 2 TIMES DAILY
Qty: 60 TABLET | Refills: 0 | Status: SHIPPED | OUTPATIENT
Start: 2023-03-11 | End: 2024-02-26

## 2023-03-11 RX ORDER — HYDROGEN PEROXIDE 3 %
20 SOLUTION, NON-ORAL MISCELLANEOUS
Qty: 20 CAPSULE | Refills: 0 | Status: SHIPPED | OUTPATIENT
Start: 2023-03-11 | End: 2023-07-25

## 2023-03-11 RX ORDER — OXYCODONE HYDROCHLORIDE 5 MG/1
5 TABLET ORAL EVERY 4 HOURS PRN
Qty: 20 TABLET | Refills: 0 | Status: SHIPPED | OUTPATIENT
Start: 2023-03-11 | End: 2023-07-25

## 2023-03-11 RX ORDER — CELECOXIB 200 MG/1
200 CAPSULE ORAL DAILY
Qty: 15 CAPSULE | Refills: 0 | Status: SHIPPED | OUTPATIENT
Start: 2023-03-11 | End: 2023-07-25

## 2023-03-11 RX ORDER — ACETAMINOPHEN 500 MG
500 TABLET ORAL EVERY 6 HOURS PRN
Qty: 20 TABLET | Refills: 0 | Status: SHIPPED | OUTPATIENT
Start: 2023-03-11 | End: 2023-11-08 | Stop reason: SDUPTHER

## 2023-03-11 RX ORDER — METHOCARBAMOL 500 MG/1
500 TABLET, FILM COATED ORAL 4 TIMES DAILY
Qty: 40 TABLET | Refills: 0 | Status: SHIPPED | OUTPATIENT
Start: 2023-03-11 | End: 2023-03-21

## 2023-03-11 RX ADMIN — ACETAMINOPHEN 1000 MG: 325 TABLET ORAL at 05:03

## 2023-03-11 RX ADMIN — POLYETHYLENE GLYCOL 3350 17 G: 17 POWDER, FOR SOLUTION ORAL at 09:03

## 2023-03-11 RX ADMIN — OXYCODONE HYDROCHLORIDE 5 MG: 5 TABLET ORAL at 10:03

## 2023-03-11 RX ADMIN — ISOSORBIDE MONONITRATE 60 MG: 30 TABLET, EXTENDED RELEASE ORAL at 09:03

## 2023-03-11 RX ADMIN — FAMOTIDINE 20 MG: 20 TABLET ORAL at 09:03

## 2023-03-11 RX ADMIN — OXYCODONE HYDROCHLORIDE 5 MG: 5 TABLET ORAL at 02:03

## 2023-03-11 RX ADMIN — SENNOSIDES AND DOCUSATE SODIUM 1 TABLET: 50; 8.6 TABLET ORAL at 10:03

## 2023-03-11 RX ADMIN — GLIPIZIDE 5 MG: 5 TABLET ORAL at 04:03

## 2023-03-11 RX ADMIN — MUPIROCIN 1 G: 20 OINTMENT TOPICAL at 09:03

## 2023-03-11 RX ADMIN — ATORVASTATIN CALCIUM 80 MG: 40 TABLET, FILM COATED ORAL at 09:03

## 2023-03-11 RX ADMIN — ASPIRIN 81 MG: 81 TABLET, COATED ORAL at 09:03

## 2023-03-11 RX ADMIN — ROPIVACAINE HYDROCHLORIDE 0.1 ML/HR: 2 INJECTION, SOLUTION EPIDURAL; INFILTRATION at 02:03

## 2023-03-11 RX ADMIN — CEFADROXIL 500 MG: 500 CAPSULE ORAL at 09:03

## 2023-03-11 RX ADMIN — LOSARTAN POTASSIUM 100 MG: 50 TABLET, FILM COATED ORAL at 09:03

## 2023-03-11 RX ADMIN — FUROSEMIDE 20 MG: 20 TABLET ORAL at 09:03

## 2023-03-11 RX ADMIN — PREGABALIN 75 MG: 75 CAPSULE ORAL at 09:03

## 2023-03-11 RX ADMIN — INSULIN DETEMIR 12 UNITS: 100 INJECTION, SOLUTION SUBCUTANEOUS at 09:03

## 2023-03-11 RX ADMIN — AMLODIPINE BESYLATE 10 MG: 10 TABLET ORAL at 09:03

## 2023-03-11 RX ADMIN — METHOCARBAMOL 750 MG: 750 TABLET ORAL at 04:03

## 2023-03-11 RX ADMIN — CARVEDILOL 25 MG: 25 TABLET, FILM COATED ORAL at 05:03

## 2023-03-11 RX ADMIN — ACETAMINOPHEN 1000 MG: 325 TABLET ORAL at 12:03

## 2023-03-11 RX ADMIN — METHOCARBAMOL 750 MG: 750 TABLET ORAL at 09:03

## 2023-03-11 RX ADMIN — CARVEDILOL 25 MG: 25 TABLET, FILM COATED ORAL at 09:03

## 2023-03-11 NOTE — SUBJECTIVE & OBJECTIVE
Principal Problem:Primary osteoarthritis of right knee    Principal Orthopedic Problem: Same    Interval History: DAYSI, patient stable, pain controlled. No acute complaints this morning. Walked 24ft with PT yesterday. Wants to go home today.       Review of patient's allergies indicates:   Allergen Reactions    Bactrim [sulfamethoxazole-trimethoprim] Other (See Comments)     Generic version  Sulfa makes her sick    Keflex [cephalexin] Other (See Comments)     Turns orange       Current Facility-Administered Medications   Medication    acetaminophen tablet 1,000 mg    amLODIPine tablet 10 mg    aspirin EC tablet 81 mg    atorvastatin tablet 80 mg    bisacodyL suppository 10 mg    carvediloL tablet 25 mg    cefadroxil capsule 500 mg    dextrose 10% bolus 125 mL 125 mL    dextrose 10% bolus 250 mL 250 mL    famotidine tablet 20 mg    furosemide tablet 20 mg    glipiZIDE tablet 5 mg    glucagon (human recombinant) injection 1 mg    glucose chewable tablet 16 g    glucose chewable tablet 24 g    insulin aspart U-100 pen 0-5 Units    insulin detemir U-100 pen 12 Units    isosorbide mononitrate 24 hr tablet 60 mg    losartan tablet 100 mg    methocarbamoL tablet 750 mg    mupirocin 2 % ointment 1 g    naloxone 0.4 mg/mL injection 0.02 mg    ondansetron injection 4 mg    oxyCODONE immediate release tablet 5 mg    polyethylene glycol packet 17 g    pregabalin capsule 75 mg    prochlorperazine injection Soln 5 mg    ROPIvacaine (PF) 2 mg/ml (0.2%) solution    senna-docusate 8.6-50 mg per tablet 1 tablet     Objective:     Vital Signs (Most Recent):  Temp: 98 °F (36.7 °C) (03/11/23 0427)  Pulse: 69 (03/11/23 0427)  Resp: 16 (03/11/23 0427)  BP: (!) 123/58 (03/11/23 0427)  SpO2: (!) 94 % (03/11/23 0427) Vital Signs (24h Range):  Temp:  [97.5 °F (36.4 °C)-98.5 °F (36.9 °C)] 98 °F (36.7 °C)  Pulse:  [69-88] 69  Resp:  [16-18] 16  SpO2:  [94 %-99 %] 94 %  BP: (102-123)/(53-60) 123/58     Weight: 106.6 kg (235 lb 0.2 oz)  Height:  "5' 2" (157.5 cm)  Body mass index is 42.98 kg/m².      Intake/Output Summary (Last 24 hours) at 3/11/2023 0536  Last data filed at 3/10/2023 1600  Gross per 24 hour   Intake 450 ml   Output 800 ml   Net -350 ml         Ortho/SPM Exam  AAOx4  NAD  Reg rate  No increased WOB    RLE:  Dressing c/d/i  Polar ice in place  SILT T/SP/DP/Andrade/Sa  Motor intact T/SP/DP  WWP extremities  FCDs in place and functioning      Significant Labs:   Recent Lab Results         03/10/23  2111   03/10/23  1610   03/10/23  1225   03/10/23  0612        POCT Glucose 186   198   195   159             All pertinent labs within the past 24 hours have been reviewed.    Significant Imaging: I have reviewed and interpreted all pertinent imaging results/findings.  "

## 2023-03-11 NOTE — PLAN OF CARE
Patient cleared for discharge from case management standpoint.    Rolling walker delivered to bedside.       03/11/23 0919   Final Note   Assessment Type Final Discharge Note   Anticipated Discharge Disposition Home   What phone number can be called within the next 1-3 days to see how you are doing after discharge? [phone]   Hospital Resources/Appts/Education Provided Provided patient/caregiver with written discharge plan information;Community resources provided

## 2023-03-11 NOTE — PT/OT/SLP PROGRESS
Physical Therapy Treatment    Patient Name:  Kaylee Romero   MRN:  [id number]    Recommendations:     Discharge Recommendations: other (see comments)   Discharge Equipment Recommendations: bedside commode, walker, rolling  Barriers to discharge: Inaccessible home    Assessment:     Kaylee Romero is a 78 y.o. female admitted with a medical diagnosis of Primary osteoarthritis of right knee.  She presents with the following impairments/functional limitations: weakness, impaired endurance, impaired self care skills, impaired functional mobility, gait instability, impaired balance, decreased ROM, decreased lower extremity function, pain, decreased safety awareness, orthopedic precautions.    Rehab Prognosis: Good; patient would benefit from acute skilled PT services to address these deficits and reach maximum level of function.    Recent Surgery: Procedure(s) (LRB):  ARTHROPLASTY, KNEE, TOTAL:RIGHT (Right) 2 Days Post-Op    Plan:     During this hospitalization, patient to be seen daily to address the identified rehab impairments via gait training, therapeutic activities, therapeutic exercises and progress toward the following goals:    Plan of Care Expires:  04/10/23    Subjective     Chief Complaint: pain  Patient/Family Comments/goals: pt upset at end of session and apologetic that she could not succeed at stair training; writing therapist redirected and encouraged pt that she had nothing to be sorry for and put forth great effort  Pain/Comfort:  Pain Rating 1: other (see comments) (unrated c/o pain; R knee)  Location - Side 1: Right  Location - Orientation 1: generalized  Location 1: knee  Pain Addressed 1: Reposition, Distraction, Other (see comments) (perineural catheter)  Pain Rating Post-Intervention 1: other (see comments) (pain relief reported at rest in reclined posiiton; pain remains unrated)      Objective:     Communicated with RN prior to session.  Patient found up in chair, reclined with telemetry,  cryotherapy, perineural catheter, FCD upon PTA entry to room.     General Precautions: Standard, fall  Orthopedic Precautions: RLE weight bearing as tolerated  Braces: N/A  Respiratory Status: Room air     Functional Mobility:  Transfers:     Sit to Stand:  minimum assistance with rolling walker  Gait: Pt ambulates 40 ft and 12 ft with RW and Min A. Pt with antalgic gait and heavy reliance on BUE support, requiring intermittent assistance to steady pt and manage RW. Pt with extremely slow chantell and unable to sustain increased chantell d/t R knee pain. R knee unstable with mild buckling noted intermittently throughout gait trials. Standing rest during first trial just a little past retirement and seated rest between trials.   Stairs:  attempted with LHR per patients home setup; pt unable to weight bear into RLE to allow for LLE step progression, pt unable to perform any stair ascension this session      AM-PAC 6 CLICK MOBILITY  Turning over in bed (including adjusting bedclothes, sheets and blankets)?: 3  Sitting down on and standing up from a chair with arms (e.g., wheelchair, bedside commode, etc.): 3  Moving from lying on back to sitting on the side of the bed?: 3  Moving to and from a bed to a chair (including a wheelchair)?: 3  Need to walk in hospital room?: 3  Climbing 3-5 steps with a railing?: 1  Basic Mobility Total Score: 16       Treatment & Education:  Prolonged time in rest breaks and education. Rest breaks to allow for pain and fatigue relief. Pt educated at length on assistance needed, inability to access home d/t inability to perform stair training, and writing therapists intention to have her re-assessed by PT to assure safety with current discharge recommendation.     Patient left up in chair, reclined with all lines intact, call button in reach, and daughter-in-law present.  Resident with Orthopedics team messaged via secure chat about pts inability to perform stair training and lack of safe  accessibility for pt to return home at this time.     GOALS:   Multidisciplinary Problems       Physical Therapy Goals          Problem: Physical Therapy    Goal Priority Disciplines Outcome Goal Variances Interventions   Physical Therapy Goal     PT, PT/OT Ongoing, Progressing     Description: Goals to be met by: 4/10/2023    Patient will increase functional independence with mobility by performin. Supine to sit with supervision  2. Sit to stand transfer with Supervision  3. Gait x150 feet with Supervision using Rolling Walker  4. Ascend/Descend 4 steps with left handrail and contact guard assistance  5. Lower extremity exercise program x30 reps per handout, with supervision                             Time Tracking:     PT Received On: 23  PT Start Time: 1210 (return start 1257)     PT Stop Time: 1215 (return end 1340)  PT Total Time (min): 5 min + 43 on return = 48 total minutes    Billable Minutes: Gait Training 30 and Therapeutic Activity 18    Treatment Type: Treatment  PT/PTA: PTA     PTA Visit Number: 1     2023

## 2023-03-11 NOTE — PLAN OF CARE
Problem: Adult Inpatient Plan of Care  Goal: Plan of Care Review  Outcome: Ongoing, Progressing  Goal: Patient-Specific Goal (Individualized)  Outcome: Ongoing, Progressing  Goal: Absence of Hospital-Acquired Illness or Injury  Outcome: Ongoing, Progressing  Goal: Optimal Comfort and Wellbeing  Outcome: Ongoing, Progressing  Goal: Readiness for Transition of Care  Outcome: Ongoing, Progressing     Problem: Diabetes Comorbidity  Goal: Blood Glucose Level Within Targeted Range  Outcome: Ongoing, Progressing     Problem: Bariatric Environmental Safety  Goal: Safety Maintained with Care  Outcome: Ongoing, Progressing     Problem: Skin Injury Risk Increased  Goal: Skin Health and Integrity  Outcome: Ongoing, Progressing     Problem: Pain Acute  Goal: Acceptable Pain Control and Functional Ability  Outcome: Ongoing, Progressing

## 2023-03-11 NOTE — PT/OT/SLP PROGRESS
"Occupational Therapy   Treatment    Name: Kaylee Romero  MRN: [id number]  Admitting Diagnosis:  Primary osteoarthritis of right knee  2 Days Post-Op  Procedure(s):  ARTHROPLASTY, KNEE, TOTAL:RIGHT   Recommendations:     Discharge Recommendations: outpatient PT  Discharge Equipment Recommendations:  bedside commode, walker, rolling  Barriers to discharge:  None    Assessment:     Kaylee Romero is a 78 y.o. female with a medical diagnosis of Primary osteoarthritis of right knee.  She presents with the following performance deficits affecting function are weakness, impaired endurance, impaired self care skills, impaired functional mobility, gait instability, decreased ROM, decreased lower extremity function, impaired balance, pain, decreased safety awareness, orthopedic precautions. Patient agreeable to OT session and tolerated well. Patient is progressing with OT intervention. Patient would benefit from continued OT services to address deficits and progress towards goals. Continue OT POC.    Rehab Prognosis:  Good; patient would benefit from acute skilled OT services to address these deficits and reach maximum level of function.       Plan:     Patient to be seen daily to address the above listed problems via self-care/home management, therapeutic activities, therapeutic exercises, neuromuscular re-education  Plan of Care Expires: 04/10/23  Plan of Care Reviewed with: patient (daughter-in-law)    Subjective   Patient agreeable to therapy    Pain/Comfort:  Pain Rating 1: other (see comments) ("That's a 12!")  Location - Side 1: Right  Location - Orientation 1: generalized  Location 1: knee  Pain Addressed 1: Reposition, Distraction, Pre-medicate for activity  Pain Rating Post-Intervention 1: 0/10    Objective:     Communicated with: RN and OTR prior to session.  Patient found up in chair with FCD, cryotherapy, telemetry, perineural catheter upon OT entry to room.  A client care conference was completed by the OTR " and the AMBROCIO prior to treatment by the AMBROCIO to discuss the patient's POC and current status.    General Precautions: Standard, fall    Orthopedic Precautions:RLE weight bearing as tolerated  Braces: N/A  Respiratory Status: Room air     Occupational Performance:     Bed Mobility:    Did not assess. Patient received OOB    Functional Mobility/Transfers:  Patient completed Sit <> Stand Transfer with contact guard assistance  with  rolling walker   Functional Mobility: did not assess today. Patient had just returned from bathroom with nursing (A)    Activities of Daily Living:  Upper Body Dressing: contact guard assistance for balance standing (Pt is Mod(I) if donning shirt seated, but Pt preferred to remain standing.)  Lower Body Dressing: minimum assistance for threading BLE into underwear. Pt instructed on sequencing and technique. DIL reports purchasing hip kit for patient.   Bathing: CGA for balance to complete abbrev UB sponge bath standing using RW (pt preferred standing)        [facility] 6 Click ADL: 17    Treatment & Education:  Education on OT POC, goals, and current progress  ADL re-training as indicated above  Addressed all patient questions/concerns within AMBROCIO scope of practice.     Patient left up in chair with all lines intact, call button in reach, RN notified, and daughter-in-law present    GOALS:   Multidisciplinary Problems       Occupational Therapy Goals          Problem: Occupational Therapy    Goal Priority Disciplines Outcome Interventions   Occupational Therapy Goal     OT, PT/OT Ongoing, Progressing    Description: Goals to be met by: 4/10/23     Patient will increase functional independence with ADLs by performing:    UE Dressing with Set-up Assistance.  LE Dressing with Supervision.  Grooming while standing at sink with Supervision.  Toileting from toilet with Supervision for hygiene and clothing management.   Sitting at edge of bed x10 minutes with Modified [place].  Supine to sit with  Modified [place].  Step transfer with Supervision  Toilet transfer to toilet with Supervision.                         Time Tracking:     OT Date of Treatment: 03/11/23  OT Start Time: 1119  OT Stop Time: 1150  OT Total Time (min): 31 min    Billable Minutes:Self Care/Home Management 31    OT/SRINIVASAN: SRINIVASAN     SRINIVASAN Visit Number: 1    3/11/2023

## 2023-03-11 NOTE — ANESTHESIA POST-OP PAIN MANAGEMENT
Acute Pain Service and Perioperative Surgical Home Progress Note    HPI  Kaylee Romero is a 78 y.o., female,   PMH TIA in 2019 w/o residual deficits, HTN, CAD s/p CABGx3 in 2019, CHFpEF, DM2, CKD3, KAMRAN w/CPAP with worsening R knee pain.    Interval history    Surgery:  Procedure(s) (LRB):  ARTHROPLASTY, KNEE, TOTAL:RIGHT (Right)    Post Op Day #: 2    Catheter type: Perineural Adductor Canal    Infusion type: Ropivacaine 0.2%  7cc q3hr IB + 5cc q30min DB    Problem List:    Active Hospital Problems    Diagnosis  POA    *Primary osteoarthritis of right knee [M17.11]  Yes    Coronary 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 pectoris [I25.118]  Yes    Type 2 diabetes mellitus with stage 3 chronic kidney disease, without long-term current use of insulin [E11.22, N18.30]  Yes     Chronic    Physical deconditioning [R53.81]  Yes    KAMRAN on CPAP [G47.33, Z99.89]  Not Applicable    Dyslipidemia, goal LDL below 70 [E78.5]  Yes    Essential hypertension [I10]  Yes     Chronic      Resolved Hospital Problems   No resolved problems to display.       Subjective:     General appearance of alert, oriented, no complaints   Pain with rest: 1    Numbers   Pain with movement: 3    Numbers   Side Effects    1. Pruritis No    2. Nausea No    3. Motor Blockade No, 0=Ability to raise lower extremities off bed    4. Sedation No, 1=awake and alert    Schedule Medications:    acetaminophen  1,000 mg Oral Q6H    amLODIPine  10 mg Oral Daily    aspirin  81 mg Oral BID    atorvastatin  80 mg Oral Daily    carvediloL  25 mg Oral BID WM    cefadroxil  500 mg Oral Q12H    famotidine  20 mg Oral BID    furosemide  20 mg Oral Daily    glipiZIDE  5 mg Oral with dinner    insulin detemir U-100  12 Units Subcutaneous QHS    isosorbide mononitrate  60 mg Oral Daily    losartan  100 mg Oral QHS    methocarbamoL  750 mg Oral TID    mupirocin  1 g Nasal BID    polyethylene glycol  17 g Oral Daily     pregabalin  75 mg Oral QHS    senna-docusate 8.6-50 mg  1 tablet Oral BID        Continuous Infusions:   ROPIvacaine (PF) 2 mg/ml (0.2%) 0.1 mL/hr (03/09/23 1719)        PRN Medications:  bisacodyL, dextrose 10%, dextrose 10%, glucagon (human recombinant), glucose, glucose, insulin aspart U-100, naloxone, ondansetron, oxyCODONE, prochlorperazine       Antibiotics:  Antibiotics (From admission, onward)    Start     Stop Route Frequency Ordered    03/10/23 2100  cefadroxil capsule 500 mg         -- Oral Every 12 hours 03/10/23 0756    03/09/23 2100  mupirocin 2 % ointment 1 g         03/14 2059 Nasl 2 times daily 03/09/23 1639           Objective:     Catheter site clean, dry, intact    Vital Signs (Most Recent):  Temp: 36.7 °C (98.1 °F) (03/11/23 0717)  Pulse: 71 (03/11/23 0717)  Resp: 16 (03/11/23 0717)  BP: (!) 111/55 (03/11/23 0717)  SpO2: (!) 94 % (03/11/23 0717) Vital Signs Range (Last 24H):  Temp:  [36.4 °C (97.5 °F)-36.9 °C (98.5 °F)]   Pulse:  [69-88]   Resp:  [16-18]   BP: (102-123)/(53-60)   SpO2:  [94 %-99 %]      I & O (Last 24H):    Intake/Output Summary (Last 24 hours) at 3/11/2023 0718  Last data filed at 3/10/2023 1600  Gross per 24 hour   Intake 450 ml   Output 800 ml   Net -350 ml       Physical Exam:    GA: Alert, comfortable, no acute distress. Obese  Pulmonary: Decreased breath sounds bilaterally. Clear to auscultation A/P/L. No wheezing, crackles, or rhonchi.  Cardiac: RRR S1 & S2 w/o rubs/murmurs/gallops.   Abdominal:Bowel sounds present. No tenderness to palpation or distension. No appreciable hepatosplenomegaly.   Skin: PNC secured with no signs of surrounding erythema, swelling, or leakage. No jaundice, rashes, or visible lesions.    Laboratory:  CBC: No results for input(s): WBC, RBC, HGB, HCT, PLT, MCV, MCH, MCHC in the last 72 hours.    BMP: No results for input(s): NA, K, CO2, CL, BUN, CREATININE, GLU, MG, PHOS, CALCIUM in the last 72 hours.    No results for input(s): PT, INR,  PROTIME, APTT in the last 72 hours.     Assessment:    Pain control adequate    Plan:    1. Pain:  Adductor canal perineural catheter in place and infusing. Good level.  Multimodal pain regimen with malcolm acetaminophen 1g q6, pregabalin 75mg qHS, robaxin 750mg TID and PRN oxycodone 5mg  as indicated unless contraindicated.  Will continue to monitor. Plan for home with [facility] if patient discharged today otherwise continue with infusion inpatient.  2. Osteoarthritis right knee s/p arthroplasty  - see plan for pain  - encouraged use of incentive spirometer to prevent atelectasis s/p surgery  - rest of plan per ortho team  3. HTN  - continue home amlodipine, carvedilol, lasix, monoket, and losartan  4. Type 2 diabetes mellitus  - restart home hypoglycemics are lower doses and titrate as appropriate:   - detemir 12 units qhs   - glipizide 5mg with dinner  - LDSSI  - POCT glucose 4 times daily before meal and at bedtime  - hypoglycemia protocol in place  - diabetic diet  5. HLD  - continue home atorvastatin 80mg daily  6. KAMRAN  - cpap at home setting  7. CAD s/p CABG  - continue home atorvastatin 80mg daily  - aspirin 81mg bid  8. Physical deconditioning  - PT/OT  9. FEN/GI:  Tolerating liquids, advance diet as tolerated.  10. DVT prophylaxis: SCD, ASA 81 BID  11. Dispo:  Pt working well with PT/OT. Case management and SW for placement. Plan for discharge today if patient works well with PT and meets goals.      Thank you for allowing us to participate in this patient's care. We will continue to follow.        Destiney Spencer MD  Anesthesiology Resident pgy2  [facility]

## 2023-03-11 NOTE — ASSESSMENT & PLAN NOTE
Kaylee Romero is a 78 y.o. female is s/p R TKA on 3/11/2023    Surgical dressing C/D/I, polar ice in place  Pain control: multimodal, Anesthesia Surgical Home following  PT/OT: WBAT RLE  DVT PPx: ASA 81 BID, FCDs at all times when not ambulating  Abx: postop Ancef + 10 days cefadroxil   Drain: none  Guevara: none    Dispo: plan to dc to home today once cleared by PT/OT

## 2023-03-11 NOTE — PROGRESS NOTES
[facility] - [facility]  Orthopedics  Progress Note    Patient Name: Kaylee Romero  MRN: [id number]  Admission Date: 3/9/2023  Hospital Length of Stay: 0 days  Attending Provider: Peterson Sharma MD  Primary Care Provider: Liz Roberts MD  Follow-up For: Procedure(s) (LRB):  ARTHROPLASTY, KNEE, TOTAL:RIGHT (Right)    Post-Operative Day: 2 Days Post-Op  Subjective:     Principal Problem:Primary osteoarthritis of right knee    Principal Orthopedic Problem: Same    Interval History: NAEON, patient stable, pain controlled. No acute complaints this morning. Walked 24ft with PT yesterday. Wants to go home today.       Review of patient's allergies indicates:   Allergen Reactions    Bactrim [sulfamethoxazole-trimethoprim] Other (See Comments)     Generic version  Sulfa makes her sick    Keflex [cephalexin] Other (See Comments)     Turns orange       Current Facility-Administered Medications   Medication    acetaminophen tablet 1,000 mg    amLODIPine tablet 10 mg    aspirin EC tablet 81 mg    atorvastatin tablet 80 mg    bisacodyL suppository 10 mg    carvediloL tablet 25 mg    cefadroxil capsule 500 mg    dextrose 10% bolus 125 mL 125 mL    dextrose 10% bolus 250 mL 250 mL    famotidine tablet 20 mg    furosemide tablet 20 mg    glipiZIDE tablet 5 mg    glucagon (human recombinant) injection 1 mg    glucose chewable tablet 16 g    glucose chewable tablet 24 g    insulin aspart U-100 pen 0-5 Units    insulin detemir U-100 pen 12 Units    isosorbide mononitrate 24 hr tablet 60 mg    losartan tablet 100 mg    methocarbamoL tablet 750 mg    mupirocin 2 % ointment 1 g    naloxone 0.4 mg/mL injection 0.02 mg    ondansetron injection 4 mg    oxyCODONE immediate release tablet 5 mg    polyethylene glycol packet 17 g    pregabalin capsule 75 mg    prochlorperazine injection Soln 5 mg    ROPIvacaine (PF) 2 mg/ml (0.2%) solution    senna-docusate 8.6-50 mg per tablet 1 tablet     Objective:  "    Vital Signs (Most Recent):  Temp: 98 °F (36.7 °C) (03/11/23 0427)  Pulse: 69 (03/11/23 0427)  Resp: 16 (03/11/23 0427)  BP: (!) 123/58 (03/11/23 0427)  SpO2: (!) 94 % (03/11/23 0427) Vital Signs (24h Range):  Temp:  [97.5 °F (36.4 °C)-98.5 °F (36.9 °C)] 98 °F (36.7 °C)  Pulse:  [69-88] 69  Resp:  [16-18] 16  SpO2:  [94 %-99 %] 94 %  BP: (102-123)/(53-60) 123/58     Weight: 106.6 kg (235 lb 0.2 oz)  Height: 5' 2" (157.5 cm)  Body mass index is 42.98 kg/m².      Intake/Output Summary (Last 24 hours) at 3/11/2023 0536  Last data filed at 3/10/2023 1600  Gross per 24 hour   Intake 450 ml   Output 800 ml   Net -350 ml         Ortho/SPM Exam  AAOx4  NAD  Reg rate  No increased WOB    RLE:  Dressing c/d/i  Polar ice in place  SILT T/SP/DP/Andrade/Sa  Motor intact T/SP/DP  WWP extremities  FCDs in place and functioning      Significant Labs:   Recent Lab Results         03/10/23  2111   03/10/23  1610   03/10/23  1225   03/10/23  0612        POCT Glucose 186   198   195   159             All pertinent labs within the past 24 hours have been reviewed.    Significant Imaging: I have reviewed and interpreted all pertinent imaging results/findings.    Assessment/Plan:     * Primary osteoarthritis of right knee  Kaylee Romero is a 78 y.o. female is s/p R TKA on 3/11/2023    Surgical dressing C/D/I, polar ice in place  Pain control: multimodal, Anesthesia Surgical Home following  PT/OT: WBAT RLE  DVT PPx: ASA 81 BID, FCDs at all times when not ambulating  Abx: postop Ancef + 10 days cefadroxil   Drain: none  Guevara: none    Dispo: plan to dc to home today once cleared by PT/OT            Wilian Romero MD  Orthopedics  [facility] - Surgery  "

## 2023-03-12 VITALS
OXYGEN SATURATION: 96 % | HEART RATE: 95 BPM | DIASTOLIC BLOOD PRESSURE: 76 MMHG | TEMPERATURE: 99 F | RESPIRATION RATE: 18 BRPM | HEIGHT: 62 IN | WEIGHT: 235 LBS | SYSTOLIC BLOOD PRESSURE: 131 MMHG | BODY MASS INDEX: 43.24 KG/M2

## 2023-03-12 LAB
POCT GLUCOSE: 148 MG/DL (ref 70–110)
POCT GLUCOSE: 177 MG/DL (ref 70–110)
POCT GLUCOSE: 227 MG/DL (ref 70–110)

## 2023-03-12 PROCEDURE — 99232 PR SUBSEQUENT HOSPITAL CARE,LEVL II: ICD-10-PCS | Mod: ,,, | Performed by: ANESTHESIOLOGY

## 2023-03-12 PROCEDURE — 97112 NEUROMUSCULAR REEDUCATION: CPT

## 2023-03-12 PROCEDURE — 25000003 PHARM REV CODE 250: Performed by: STUDENT IN AN ORGANIZED HEALTH CARE EDUCATION/TRAINING PROGRAM

## 2023-03-12 PROCEDURE — 97116 GAIT TRAINING THERAPY: CPT

## 2023-03-12 PROCEDURE — 97530 THERAPEUTIC ACTIVITIES: CPT

## 2023-03-12 PROCEDURE — 25000003 PHARM REV CODE 250: Performed by: NURSE PRACTITIONER

## 2023-03-12 PROCEDURE — 99232 SBSQ HOSP IP/OBS MODERATE 35: CPT | Mod: ,,, | Performed by: ANESTHESIOLOGY

## 2023-03-12 RX ORDER — ACETAMINOPHEN 500 MG
1000 TABLET ORAL EVERY 8 HOURS
Status: DISCONTINUED | OUTPATIENT
Start: 2023-03-12 | End: 2023-03-12 | Stop reason: HOSPADM

## 2023-03-12 RX ORDER — ROPIVACAINE HYDROCHLORIDE 2 MG/ML
INJECTION, SOLUTION EPIDURAL; INFILTRATION; PERINEURAL
Status: DISCONTINUED
Start: 2023-03-12 | End: 2023-03-12 | Stop reason: HOSPADM

## 2023-03-12 RX ADMIN — MUPIROCIN 1 G: 20 OINTMENT TOPICAL at 09:03

## 2023-03-12 RX ADMIN — METHOCARBAMOL 750 MG: 750 TABLET ORAL at 08:03

## 2023-03-12 RX ADMIN — CARVEDILOL 25 MG: 25 TABLET, FILM COATED ORAL at 08:03

## 2023-03-12 RX ADMIN — OXYCODONE HYDROCHLORIDE 5 MG: 5 TABLET ORAL at 03:03

## 2023-03-12 RX ADMIN — CEFADROXIL 500 MG: 500 CAPSULE ORAL at 08:03

## 2023-03-12 RX ADMIN — FAMOTIDINE 20 MG: 20 TABLET ORAL at 08:03

## 2023-03-12 RX ADMIN — ATORVASTATIN CALCIUM 80 MG: 40 TABLET, FILM COATED ORAL at 08:03

## 2023-03-12 RX ADMIN — OXYCODONE HYDROCHLORIDE 5 MG: 5 TABLET ORAL at 08:03

## 2023-03-12 RX ADMIN — ACETAMINOPHEN 1000 MG: 325 TABLET ORAL at 05:03

## 2023-03-12 RX ADMIN — ACETAMINOPHEN 1000 MG: 325 TABLET ORAL at 12:03

## 2023-03-12 RX ADMIN — ISOSORBIDE MONONITRATE 60 MG: 30 TABLET, EXTENDED RELEASE ORAL at 08:03

## 2023-03-12 RX ADMIN — ASPIRIN 81 MG: 81 TABLET, COATED ORAL at 08:03

## 2023-03-12 RX ADMIN — AMLODIPINE BESYLATE 10 MG: 10 TABLET ORAL at 08:03

## 2023-03-12 NOTE — ANESTHESIA POST-OP PAIN MANAGEMENT
Acute Pain Service and Perioperative Surgical Home Progress Note    HPI  Kaylee Romero is a 78 y.o., female,   PMH TIA in 2019 w/o residual deficits, HTN, CAD s/p CABGx3 in 2019, CHFpEF, DM2, CKD3, KAMRAN w/CPAP with worsening R knee pain.    Interval history    Surgery:  Procedure(s) (LRB):  ARTHROPLASTY, KNEE, TOTAL:RIGHT (Right)    Post Op Day #: 3    Catheter type: Perineural Adductor Canal    Infusion type: Ropivacaine 0.2%  7cc q3hr IB + 5cc q30min DB    Problem List:    There are no hospital problems to display for this patient.      Subjective:     General appearance of alert, oriented, no complaints   Pain with rest: 1    Numbers   Pain with movement: 3    Numbers   Side Effects    1. Pruritis No    2. Nausea No    3. Motor Blockade No, 0=Ability to raise lower extremities off bed    4. Sedation No, 1=awake and alert    Schedule Medications:        Continuous Infusions:       PRN Medications:         Antibiotics:  Antibiotics (From admission, onward)    None           Objective:     Catheter site clean, dry, intact    Vital Signs (Most Recent):    Vital Signs Range (Last 24H):  Temp:  [36.3 °C (97.4 °F)-36.8 °C (98.3 °F)]   Pulse:  [64-86]   Resp:  [16-18]   BP: (117-141)/(56-71)   SpO2:  [95 %-100 %]      I & O (Last 24H):  No intake or output data in the 24 hours ending 03/12/23 1203    Physical Exam:    GA: Alert, comfortable, no acute distress. Obese  Pulmonary: Decreased breath sounds bilaterally. Clear to auscultation A/P/L. No wheezing, crackles, or rhonchi.  Cardiac: RRR S1 & S2 w/o rubs/murmurs/gallops.   Abdominal:Bowel sounds present. No tenderness to palpation or distension. No appreciable hepatosplenomegaly.   Skin: PNC secured with no signs of surrounding erythema, swelling, or leakage. No jaundice, rashes, or visible lesions.    Laboratory:  CBC: No results for input(s): WBC, RBC, HGB, HCT, PLT, MCV, MCH, MCHC in the last 72 hours.    BMP: No results for input(s): NA, K, CO2, CL, BUN,  CREATININE, GLU, MG, PHOS, CALCIUM in the last 72 hours.    No results for input(s): PT, INR, PROTIME, APTT in the last 72 hours.     Assessment:    Pain control adequate    Plan:    1. Pain:  Adductor canal perineural catheter in place and infusing. Multimodal pain regimen with malcolm acetaminophen 1g q8, pregabalin 75mg qHS, robaxin 750mg TID and PRN oxycodone 5mg  as indicated unless contraindicated.  Will continue to monitor. Plan for discharge today, [facility] teaching completed. Patient and  understand and agree with plan.  2. Osteoarthritis right knee s/p arthroplasty  - see plan for pain  - encouraged use of incentive spirometer to prevent atelectasis s/p surgery  - rest of plan per ortho team  3. HTN  - continue home amlodipine, carvedilol, lasix, monoket, and losartan  4. Type 2 diabetes mellitus  - restart home hypoglycemics are lower doses and titrate as appropriate:   - detemir 12 units qhs   - glipizide 5mg with dinner  - LDSSI  - POCT glucose 4 times daily before meal and at bedtime  - hypoglycemia protocol in place  - diabetic diet  5. HLD  - continue home atorvastatin 80mg daily  6. KAMRAN  - cpap at home setting  7. CAD s/p CABG  - continue home atorvastatin 80mg daily  - aspirin 81mg bid  8. Physical deconditioning  - PT/OT  9. FEN/GI:  Tolerating liquids, advance diet as tolerated.  10. DVT prophylaxis: SCD, ASA 81 BID  11. Dispo:  Pt working well with PT/OT. Case management and SW for placement. Plan for discharge today if patient works well with PT and meets goals.      Thank you for allowing us to participate in this patient's care. We will continue to follow.    Roma Heredia MD  PGY3  [facility]

## 2023-03-12 NOTE — ASSESSMENT & PLAN NOTE
Kaylee Romero is a 78 y.o. female is s/p R TKA on 3/12/2023    Surgical dressing C/D/I, polar ice in place  Pain control: multimodal, Anesthesia Surgical Home following  PT/OT: WBAT RLE  DVT PPx: ASA 81 BID, FCDs at all times when not ambulating  Abx: postop Ancef + 10 days cefadroxil   Drain: none  Guevara: none    Dispo: D/c held 3/11 after poor PT, will recheck this afternoon, possibly home today

## 2023-03-12 NOTE — PT/OT/SLP PROGRESS
Occupational Therapy   Treatment    Name: Kaylee Romero  MRN: [id number]  Admitting Diagnosis:  Primary osteoarthritis of right knee  3 Days Post-Op    Recommendations:     Discharge Recommendations: other (see comments)  Discharge Equipment Recommendations:  bedside commode, walker, rolling  Barriers to discharge:  None    Assessment:     Kaylee Romero is a 78 y.o. female with a medical diagnosis of Primary osteoarthritis of right knee. Performance deficits affecting function are weakness, impaired endurance, impaired self care skills, impaired functional mobility, gait instability, impaired balance, impaired cardiopulmonary response to activity, orthopedic precautions, pain, decreased ROM, decreased lower extremity function.     Patient with fair tolerance to OT session this morning, received sleeping and required increased time for arousal and use of pain pump prior to activity. One instance of R knee buckling during transfer to chair but able to self correct. Pt would benefit from continued skilled acute OT services in order to maximize independence and safety with ADLs and functional mobility to ensure safe return to PLOF in the least restrictive environment.    Rehab Prognosis:  Good; patient would benefit from acute skilled OT services to address these deficits and reach maximum level of function.       Plan:     Patient to be seen daily to address the above listed problems via self-care/home management, therapeutic activities, therapeutic exercises, neuromuscular re-education  Plan of Care Expires: 04/10/23  Plan of Care Reviewed with: patient    Subjective     Pain/Comfort:  Pain Rating 1:  (unrated R knee and L thigh pain)  Pain Addressed 1: Reposition, Distraction (perineural catheter)  Pain Rating Post-Intervention 1:  (pain relief reported at rest in reclined posiiton; pain remains unrated)    Objective:     Communicated with: RN prior to session.  Patient found supine with cryotherapy, perineural  catheter, FCD upon OT entry to room.    General Precautions: Standard, fall    Orthopedic Precautions:RLE weight bearing as tolerated  Braces: N/A  Respiratory Status: Room air     Occupational Performance:     Bed Mobility:    Patient completed Rolling/Turning to Right with contact guard assistance  Patient completed Scooting/Bridging with contact guard assistance  Patient completed Supine to Sit with contact guard assistance     Functional Mobility/Transfers:  Patient completed Sit <> Stand Transfer with contact guard assistance  with  rolling walker   Patient completed Bed <> Chair Transfer using Step Transfer technique with contact guard assistance with rolling walker    Activities of Daily Living:  Lower Body Dressing: maximal assistance to don socks prior to transfer      [facility] 6 Click ADL: 17    Treatment & Education:  Therapist provided facilitation and instruction of proper body mechanics and fall prevention strategies during tasks listed above.  Instructed patient to sit in bedside chair daily to increase OOB/activity tolerance.  Instructed patient to use call light to have nursing staff assist with needs/transfers.  Discussed OT POC and answered all questions within OT scope of practice.      Patient left up in chair with all lines intact, call button in reach, RN notified, and spouse present    GOALS:   Multidisciplinary Problems       Occupational Therapy Goals          Problem: Occupational Therapy    Goal Priority Disciplines Outcome Interventions   Occupational Therapy Goal     OT, PT/OT Ongoing, Progressing    Description: Goals to be met by: 4/10/23     Patient will increase functional independence with ADLs by performing:    UE Dressing with Set-up Assistance.  LE Dressing with Supervision.  Grooming while standing at sink with Supervision.  Toileting from toilet with Supervision for hygiene and clothing management.   Sitting at edge of bed x10 minutes with Modified [place].  Supine to sit  with Modified [place].  Step transfer with Supervision  Toilet transfer to toilet with Supervision.                         Time Tracking:     OT Date of Treatment: 03/12/23  OT Start Time: 0813  OT Stop Time: 0840  OT Total Time (min): 27 min    Billable Minutes:Therapeutic Activity 27    OT/SRINIVASAN: OT     SRINIVASAN Visit Number: 1    3/12/2023

## 2023-03-12 NOTE — PLAN OF CARE
[facility] faxed Pt's face sheet, h & p and HH orders to [employer] at . [facility] updated nurse that Pt is ok to dc and HH agency will reach out to Pt tomorrow with provider and time they will come to house to admit Pt with HH nurse.

## 2023-03-12 NOTE — PT/OT/SLP PROGRESS
Physical Therapy Treatment    Patient Name:  Kaylee Romero   MRN:  [id number]  Admitting Diagnosis:  Primary osteoarthritis of right knee   Recent Surgery: Procedure(s) (LRB):  ARTHROPLASTY, KNEE, TOTAL:RIGHT (Right) 3 Days Post-Op  Admit Date: 3/9/2023  Length of Stay: 0 days    Recommendations:     Discharge Recommendations:  other (see comments)   Discharge Equipment Recommendations: bedside commode, walker, rolling   Barriers to discharge: Inaccessible home environment and Evolving Clinical Presentation    Assessment:     Kaylee Romero is a 78 y.o. female admitted with a medical diagnosis of Primary osteoarthritis of right knee.  She presents with the following impairments/functional limitations:  weakness, impaired functional mobility, impaired endurance, gait instability, impaired balance, impaired self care skills, decreased lower extremity function, decreased ROM, decreased safety awareness, pain, orthopedic precautions.     Pt agreeable to therapy, concerned with current discharge plan, unsure how she will be able to get up her stairs in her current state. Pt stands from bedside chair and Toilet with CGA to min A, ambulates with CGA to min A for balance, SUP for toileting, min A to ascend stairs with HR and SPC, mod A to descend. Pt performs adequately well on stairs to discharge home with assistance, prefer home with home health to minimize trips on stairs. Continue to progress ambulation and independence with steps.    Rehab Prognosis: Fair; patient would benefit from acute skilled PT services to address these deficits and reach maximum level of function.    Recent Surgery: Procedure(s) (LRB):  ARTHROPLASTY, KNEE, TOTAL:RIGHT (Right) 3 Days Post-Op    Treatment Tolerated: Fairly Well    Highest level of mobility achieved this visit: ascends/descends one flight of stairs with min to mod A and SPC and HR    Activity with RN/PCT: short distance ambulation with RW and 1 person assist    Plan:     During  "this hospitalization, patient to be seen daily to address the identified rehab impairments via therapeutic exercises, therapeutic activities, gait training, neuromuscular re-education and progress toward the following goals:    Plan of Care Expires:  04/10/23    Subjective     RN Yolanda notified prior to session. Pt's  present upon PT entrance into room.    Chief Complaint: pain  Patient/Family Comments/goals: "Tell Johan I appreciate his patience and expertise."  Pain/Comfort:  Pain Rating 1:  ("not much since I pushed my button")  Location - Side 1: Right  Location - Orientation 1: generalized  Location 1: knee  Pain Addressed 1: Pre-medicate for activity, Reposition, Distraction      Objective:     Additional staff present: none    Patient found up in chair with: cryotherapy, perineural catheter, FCD   Cognition:   Alert and Cooperative  Command following: Follows one-step verbal commands  Fluency: clear/fluent  General Precautions: Standard, Cardiac fall   Orthopedic Precautions:RLE weight bearing as tolerated   Braces: N/A   Body mass index is 42.98 kg/m².  Oxygen Device: Room Air        Vitals: /76 (BP Location: Left arm, Patient Position: Sitting)   Pulse 95   Temp 98.7 °F (37.1 °C) (Oral)   Resp 18   Ht 5' 2" (1.575 m)   Wt 106.6 kg (235 lb 0.2 oz)   LMP 01/09/2001 (Approximate)   SpO2 96%   Breastfeeding No   BMI 42.98 kg/m²     Outcome Measures:  AM-PAC 6 CLICK MOBILITY  Turning over in bed (including adjusting bedclothes, sheets and blankets)?: 3  Sitting down on and standing up from a chair with arms (e.g., wheelchair, bedside commode, etc.): 3  Moving from lying on back to sitting on the side of the bed?: 3  Moving to and from a bed to a chair (including a wheelchair)?: 3  Need to walk in hospital room?: 3  Climbing 3-5 steps with a railing?: 2  Basic Mobility Total Score: 17     Functional Mobility:    Bed Mobility:   Pt found/returned to bedside chair    Transfers:   Sit <> Stand " Transfer: stand by assistance (toilet) to minimum assistance (bedside chair) with rolling walker   Stand <> Sit Transfer: contact guard assistance to minimum assistance with rolling walker   [id number] from bedside chair and [id number] from toliet    Standing Balance:  Assistance Level Required: Contact Guard Assistance  Patient used: rolling walker   Time: 5 min + 3 min x 3  Postural deviations noted: mildly slouched posture  Encouraged: upright stance, strong knees  Comments: mild buckling with fatigue      Gait:  Patient ambulated: 50ft + 20ft x 2   Patient required: contact guard to minimal assist with fatigue  Patient used:  rolling walker   Gait Pattern observed: swing-through gait  Gait Deviation(s): occasional unsteady gait, decreased step length, decreased weight shift, antalgic gait, and decreased chantell  Impairments due to: pain, decreased strength, and decreased endurance  all lines remained intact throughout ambulation trial  Chair follow for patient safety  Gait belt utilized  Comments: R knee buckling with fatigue    Stairs:  Pt ascended/descended 4 stair(s) with Straight Cane with left handrail with Minimal Assistance (ascend) to Moderate Assistance (descend).       Education:  Time provided for education, counseling and discussion of health disposition in regards to patient's current status  All questions answered within PT scope of practice and to patient's satisfaction  PT role in POC to address current functional deficits  Pt educated on proper body mechanics, safety techniques, and energy conservation with PT facilitation and cueing throughout session    Patient left up in chair with all lines intact, call button in reach, and PCT and  present.    GOALS:   Multidisciplinary Problems       Physical Therapy Goals          Problem: Physical Therapy    Goal Priority Disciplines Outcome Goal Variances Interventions   Physical Therapy Goal     PT, PT/OT Ongoing, Progressing     Description: Goals  to be met by: 4/10/2023    Patient will increase functional independence with mobility by performin. Supine to sit with supervision  2. Sit to stand transfer with Supervision  3. Gait x150 feet with Supervision using Rolling Walker  4. Ascend/Descend 4 steps with left handrail and contact guard assistance  5. Lower extremity exercise program x30 reps per handout, with supervision                           Time Tracking:     PT Received On: 23  PT Start Time: 1139     PT Stop Time: 1239  PT Total Time (min): 60 min     Billable Minutes:   Gait Training 25 min, Therapeutic Activity 15 min, and Neuromuscular Re-education 15 min    Treatment Type: Treatment  PT/PTA: PT       Giovani Laguerre, PT, DPT  3/12/2023

## 2023-03-12 NOTE — ADDENDUM NOTE
Addendum  created 03/12/23 0652 by Roma Heredia MD    Order list changed, Pharmacy for encounter modified

## 2023-03-12 NOTE — PROGRESS NOTES
[facility] - [facility]  Orthopedics  Progress Note    Patient Name: Kaylee Romero  MRN: [id number]  Admission Date: 3/9/2023  Hospital Length of Stay: 0 days  Attending Provider: Peterson Sharma MD  Primary Care Provider: Liz Roberts MD  Follow-up For: Procedure(s) (LRB):  ARTHROPLASTY, KNEE, TOTAL:RIGHT (Right)    Post-Operative Day: 3 Days Post-Op  Subjective:     Principal Problem:Primary osteoarthritis of right knee    Principal Orthopedic Problem: Same    Interval History: NAEON, patient stable, pain controlled. No acute complaints this morning. Walked 50ft with PT yesterday but very slow and after discussion with PT discharge yesterday was held. Possibly home today if PT goes well.      Review of patient's allergies indicates:   Allergen Reactions    Bactrim [sulfamethoxazole-trimethoprim] Other (See Comments)     Generic version  Sulfa makes her sick    Keflex [cephalexin] Other (See Comments)     Turns orange       Current Facility-Administered Medications   Medication    acetaminophen tablet 1,000 mg    amLODIPine tablet 10 mg    aspirin EC tablet 81 mg    atorvastatin tablet 80 mg    bisacodyL suppository 10 mg    carvediloL tablet 25 mg    cefadroxil capsule 500 mg    dextrose 10% bolus 125 mL 125 mL    dextrose 10% bolus 250 mL 250 mL    famotidine tablet 20 mg    furosemide tablet 20 mg    glipiZIDE tablet 5 mg    glucagon (human recombinant) injection 1 mg    glucose chewable tablet 16 g    glucose chewable tablet 24 g    insulin aspart U-100 pen 0-5 Units    insulin detemir U-100 pen 12 Units    isosorbide mononitrate 24 hr tablet 60 mg    losartan tablet 100 mg    methocarbamoL tablet 750 mg    mupirocin 2 % ointment 1 g    naloxone 0.4 mg/mL injection 0.02 mg    ondansetron injection 4 mg    oxyCODONE immediate release tablet 5 mg    polyethylene glycol packet 17 g    pregabalin capsule 75 mg    prochlorperazine injection Soln 5 mg    ROPIvacaine (PF) 2 mg/ml  "(0.2%) solution    senna-docusate 8.6-50 mg per tablet 1 tablet     Objective:     Vital Signs (Most Recent):  Temp: 97.4 °F (36.3 °C) (03/12/23 0420)  Pulse: 64 (03/12/23 0420)  Resp: 16 (03/12/23 0420)  BP: (!) 117/56 (03/12/23 0420)  SpO2: 95 % (03/12/23 0420) Vital Signs (24h Range):  Temp:  [97.4 °F (36.3 °C)-98.3 °F (36.8 °C)] 97.4 °F (36.3 °C)  Pulse:  [64-86] 64  Resp:  [16-18] 16  SpO2:  [94 %-100 %] 95 %  BP: (111-134)/(55-65) 117/56     Weight: 106.6 kg (235 lb 0.2 oz)  Height: 5' 2" (157.5 cm)  Body mass index is 42.98 kg/m².    No intake or output data in the 24 hours ending 03/12/23 0624      Ortho/SPM Exam  AAOx4  NAD  Reg rate  No increased WOB    RLE:  Dressing c/d/i  Polar ice in place  SILT T/SP/DP/Andrade/Sa  Motor intact T/SP/DP  WWP extremities  FCDs in place and functioning      Significant Labs:   Recent Lab Results         03/12/23  0611   03/11/23  2130   03/11/23  1128   03/11/23  0624        POCT Glucose 148   151   204   200             All pertinent labs within the past 24 hours have been reviewed.    Significant Imaging: I have reviewed and interpreted all pertinent imaging results/findings.    Assessment/Plan:     * Primary osteoarthritis of right knee  Kaylee Romero is a 78 y.o. female is s/p R TKA on 3/12/2023    Surgical dressing C/D/I, polar ice in place  Pain control: multimodal, Anesthesia Surgical Home following  PT/OT: WBAT RLE  DVT PPx: ASA 81 BID, FCDs at all times when not ambulating  Abx: postop Ancef + 10 days cefadroxil   Drain: none  Guevara: none    Dispo: D/c held 3/11 after poor PT, will recheck this afternoon, possibly home today           Wilian Romero MD  Orthopedics  [facility] - Surgery  "

## 2023-03-12 NOTE — PLAN OF CARE
[facility] - [facility]  Discharge Final Note    Primary Care Provider: Liz Roberts MD    Expected Discharge Date: 3/12/2023    Final Discharge Note (most recent)       Final Note - 03/12/23 1504          Final Note    Assessment Type Final Discharge Note     Anticipated Discharge Disposition Home-Health Care [facility]     Hospital Resources/Appts/Education Provided Appointments scheduled and added to AVS        Post-Acute Status    Post-Acute Authorization Home Health     Home Health Status Referrals Sent     Discharge Delays None known at this time                     Important Message from Medicare

## 2023-03-12 NOTE — SUBJECTIVE & OBJECTIVE
Principal Problem:Primary osteoarthritis of right knee    Principal Orthopedic Problem: Same    Interval History: DAYSI, patient stable, pain controlled. No acute complaints this morning. Walked 50ft with PT yesterday but very slow and after discussion with PT discharge yesterday was held. Possibly home today if PT goes well.      Review of patient's allergies indicates:   Allergen Reactions    Bactrim [sulfamethoxazole-trimethoprim] Other (See Comments)     Generic version  Sulfa makes her sick    Keflex [cephalexin] Other (See Comments)     Turns orange       Current Facility-Administered Medications   Medication    acetaminophen tablet 1,000 mg    amLODIPine tablet 10 mg    aspirin EC tablet 81 mg    atorvastatin tablet 80 mg    bisacodyL suppository 10 mg    carvediloL tablet 25 mg    cefadroxil capsule 500 mg    dextrose 10% bolus 125 mL 125 mL    dextrose 10% bolus 250 mL 250 mL    famotidine tablet 20 mg    furosemide tablet 20 mg    glipiZIDE tablet 5 mg    glucagon (human recombinant) injection 1 mg    glucose chewable tablet 16 g    glucose chewable tablet 24 g    insulin aspart U-100 pen 0-5 Units    insulin detemir U-100 pen 12 Units    isosorbide mononitrate 24 hr tablet 60 mg    losartan tablet 100 mg    methocarbamoL tablet 750 mg    mupirocin 2 % ointment 1 g    naloxone 0.4 mg/mL injection 0.02 mg    ondansetron injection 4 mg    oxyCODONE immediate release tablet 5 mg    polyethylene glycol packet 17 g    pregabalin capsule 75 mg    prochlorperazine injection Soln 5 mg    ROPIvacaine (PF) 2 mg/ml (0.2%) solution    senna-docusate 8.6-50 mg per tablet 1 tablet     Objective:     Vital Signs (Most Recent):  Temp: 97.4 °F (36.3 °C) (03/12/23 0420)  Pulse: 64 (03/12/23 0420)  Resp: 16 (03/12/23 0420)  BP: (!) 117/56 (03/12/23 0420)  SpO2: 95 % (03/12/23 0420) Vital Signs (24h Range):  Temp:  [97.4 °F (36.3 °C)-98.3 °F (36.8 °C)] 97.4 °F (36.3 °C)  Pulse:  [64-86] 64  Resp:  [16-18] 16  SpO2:  [94 %-100  "%] 95 %  BP: (111-134)/(55-65) 117/56     Weight: 106.6 kg (235 lb 0.2 oz)  Height: 5' 2" (157.5 cm)  Body mass index is 42.98 kg/m².    No intake or output data in the 24 hours ending 03/12/23 0624      Ortho/SPM Exam  AAOx4  NAD  Reg rate  No increased WOB    RLE:  Dressing c/d/i  Polar ice in place  SILT T/SP/DP/Andrade/Sa  Motor intact T/SP/DP  WWP extremities  FCDs in place and functioning      Significant Labs:   Recent Lab Results         03/12/23  0611   03/11/23  2130   03/11/23  1128   03/11/23  0624        POCT Glucose 148   151   204   200             All pertinent labs within the past 24 hours have been reviewed.    Significant Imaging: I have reviewed and interpreted all pertinent imaging results/findings.  "

## 2023-03-12 NOTE — PLAN OF CARE
[facility]    HOME HEALTH ORDERS  FACE TO FACE ENCOUNTER    Patient Name: Kaylee Romero  YOB: 1945    PCP: Liz Roberts MD   PCP Address: [address] / [place] [address]  PCP Phone Number: [phone]  PCP Fax: [phone]    Encounter Date: 03/12/2023    Admit to Home Health    Diagnoses:  Active Hospital Problems    Diagnosis  POA    *Primary osteoarthritis of right knee [M17.11]  Yes    Coronary 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 pectoris [I25.118]  Yes    Type 2 diabetes mellitus with stage 3 chronic kidney disease, without long-term current use of insulin [E11.22, N18.30]  Yes     Chronic    Physical deconditioning [R53.81]  Yes    KAMRAN on CPAP [G47.33, Z99.89]  Not Applicable    Dyslipidemia, goal LDL below 70 [E78.5]  Yes    Essential hypertension [I10]  Yes     Chronic      Resolved Hospital Problems   No resolved problems to display.       Future Appointments   Date Time Provider Department Center   3/13/2023 11:00 AM TELEMED NURSE, [facility]   3/13/2023  1:00 PM Jace Rousseau, PT [facility]   3/15/2023  2:30 PM Kim Carver, PTA [facility]   3/17/2023 11:45 AM Kim Carver, PTA [facility]   3/20/2023  1:00 PM Maxwell Brewer, PT [facility]   3/22/2023  1:00 PM Jace Rousseau, PT [facility]   3/23/2023  2:30 PM Anshul Flaherty NP [facility]   3/24/2023 12:30 PM Kim Carver, PTA [facility]   3/27/2023  1:00 PM Maxwell Brewer, PT [facility]   3/29/2023  1:00 PM Jace Rousseau, PT [facility]   3/31/2023  1:45 PM Kim Carver, PTA [facility]   4/3/2023  3:15 PM Kim Carver, PTA [facility]   4/6/2023  3:15 PM Kim Carver, PTA [facility]   4/10/2023  3:15 PM Kim Carver, PTA  [facility]   4/12/2023  2:00 PM Lexi Ford, ANDREW [facility]   4/13/2023  3:15 PM Kim Lowryer, PTA [facility]   4/17/2023  3:15 PM Chantale-Anya Lowryer, PTA [facility]   4/20/2023  3:15 PM Maxwell Brewer, PT [facility]   5/9/2023  1:00 PM Suki López, OD [facility]IC[facility]   6/7/2023  2:15 PM Donna Gillis, DPM [facility] [place]   6/9/2023  3:00 PM [facility], DEXA1 [facility] BMD [facility]   7/18/2023  1:30 PM LAB, [facility] LAB [facility]   7/18/2023  1:45 PM LAB, [facility] LAB [facility]   7/25/2023  2:30 PM Abbey Fox, NP [facility] ENDODIA [facility]           I have seen and examined this patient face to face today. My clinical findings that support the need for the home health skilled services and home bound status are the following:  Weakness/numbness causing balance and gait disturbance due to Surgery making it taxing to leave home.    Allergies:  Review of patient's allergies indicates:   Allergen Reactions    Bactrim [sulfamethoxazole-trimethoprim] Other (See Comments)     Generic version  Sulfa makes her sick    Keflex [cephalexin] Other (See Comments)     Turns orange       Diet: regular diet    Activities: activity as tolerated    Nursing:   SN to complete comprehensive assessment including routine vital signs. Instruct on disease process and s/s of complications to report to MD. Follow specific home health arthoplasty protocol. Review/verify medication list sent home with the patient at time of discharge  and instruct patient/caregiver as needed. If coumadin ordered, coumadin clinic to manage INR with INR draws 2x per week with a goal to maintain INR between 1.8 and 2.2. Frequency may be adjusted depending on start of care date.    Notify MD if SBP > 160 or < 90; DBP > 90 or < 50; HR > 120 or < 50; Temp > 101    Home Medical Equipment:  Walker, 3-1 bedside commode, transfer tub bench    CONSULTS:     Physical Therapy to evaluate and treat. Evaluate for home safety and equipment needs; Establish/upgrade home exercise program. Perform / instruct on therapeutic exercises, gait training, transfer training, and Range of Motion.      WOUND CARE ORDERS  Follow Home Health specific protocol. Keep wound clean and dry, leave dressings in place    Medications: Review discharge medications with patient and family and provide education.      Current Discharge Medication List        START taking these medications    Details   !! acetaminophen (TYLENOL) 500 MG tablet Take 1 tablet (500 mg total) by mouth every 6 (six) hours as needed for Pain.  Qty: 20 tablet, Refills: 0      aspirin (ECOTRIN) 81 MG EC tablet Take 1 tablet (81 mg total) by mouth 2 (two) times a day.  Qty: 60 tablet, Refills: 0      cefadroxil (DURICEF) 500 MG Cap Take 1 capsule (500 mg total) by mouth every 12 (twelve) hours.  Qty: 20 capsule, Refills: 0      celecoxib (CELEBREX) 200 MG capsule Take 1 capsule (200 mg total) by mouth once daily.  Qty: 15 capsule, Refills: 0      esomeprazole (NEXIUM) 20 MG capsule Take 1 capsule (20 mg total) by mouth before breakfast. for 20 days  Qty: 20 capsule, Refills: 0      methocarbamoL (ROBAXIN) 500 MG Tab Take 1 tablet (500 mg total) by mouth 4 (four) times daily. for 10 days  Qty: 40 tablet, Refills: 0      oxyCODONE (ROXICODONE) 5 MG immediate release tablet Take 1 tablet (5 mg total) by mouth every 4 (four) hours as needed for Pain.  Qty: 20 tablet, Refills: 0    Comments: Quantity prescribed more than 7 day supply? No       !! - Potential duplicate medications found. Please discuss with provider.        CONTINUE these medications which have NOT CHANGED    Details   !! acetaminophen (TYLENOL) 500 MG tablet Take 500 mg by mouth 2 (two) times daily as needed for Pain.      amLODIPine (NORVASC) 10 MG tablet TAKE 1 TABLET(10 MG) BY MOUTH EVERY DAY  Qty: 90 tablet, Refills: 2    Associated Diagnoses: Hypertensive  heart disease without heart failure      ammonium lactate (LAC-HYDRIN) 12 % lotion Apply topically as needed for Dry Skin. On the feet and toenails.  Qty: 225 g, Refills: 6    Associated Diagnoses: Dry skin; Corn or callus      aspirin 81 MG Chew Take 1 tablet (81 mg total) by mouth once daily.  Refills: 0      atorvastatin (LIPITOR) 80 MG tablet TAKE 1 TABLET(80 MG) BY MOUTH EVERY DAY  Qty: 90 tablet, Refills: 3    Comments: Please discontinue previous prescriptions with the same name and strength.  Associated Diagnoses: Coronary 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 pectoris      carvediloL (COREG) 25 MG tablet TAKE 1 TABLET(25 MG) BY MOUTH TWICE DAILY WITH MEALS  Qty: 180 tablet, Refills: 1    Associated Diagnoses: Hypertensive heart disease without heart failure      diclofenac sodium (VOLTAREN) 1 % Gel Apply 2 g topically 4 (four) times daily as needed. To painful area on the feet.  Qty: 500 g, Refills: 5    Comments: 5 x 100g tubes  Associated Diagnoses: Left foot pain; Contusion of dorsum of foot      diphenoxylate-atropine 2.5-0.025 mg (LOMOTIL) 2.5-0.025 mg per tablet TAKE 1 TABLET BY MOUTH FOUR TIMES DAILY AS NEEDED FOR DIARRHEA  Qty: 30 tablet, Refills: 30    Associated Diagnoses: Irritable bowel syndrome with diarrhea      FEROSUL 325 mg (65 mg iron) Tab tablet TAKE 1 TABLET BY MOUTH DAILY  Qty: 90 tablet, Refills: 1    Associated Diagnoses: Normocytic anemia      furosemide (LASIX) 20 MG tablet TAKE 1 TABLET BY MOUTH ON SATURDAY, SUNDAY, MONDAY, WEDNESDAY AND FRIDAY  Qty: 30 tablet, Refills: 3      glipiZIDE (GLUCOTROL) 5 MG tablet TAKE 2 TABLETS BY MOUTH WITH DINNER OR EVENING MEAL  Qty: 120 tablet, Refills: 3    Associated Diagnoses: Type 2 diabetes mellitus with stage 3a chronic kidney disease, with long-term current use of insulin      insulin glargine,hum.rec.anlog (BASAGLAR KWIKPEN U-100 INSULIN SUBQ) Inject 22 Units into the skin every evening.      irbesartan  "(AVAPRO) 300 MG tablet TAKE 1 TABLET(300 MG) BY MOUTH EVERY EVENING  Qty: 90 tablet, Refills: 2    Associated Diagnoses: Hypertensive heart disease without heart failure      isosorbide mononitrate (IMDUR) 30 MG 24 hr tablet Take 2 tablets (60 mg total) by mouth once daily.  Qty: 180 tablet, Refills: 3      alendronate (FOSAMAX) 70 MG tablet TAKE 1 TABLET(70 MG) BY MOUTH EVERY 7 DAYS  Qty: 12 tablet, Refills: 1    Associated Diagnoses: Osteoporosis without current pathological fracture, unspecified osteoporosis type      BD BOO 2ND GEN PEN NEEDLE 32 gauge x 5/32" Ndle USE EVERY DAY  Qty: 100 each, Refills: 8      blood sugar diagnostic Strp 1 strip by Misc.(Non-Drug; Combo Route) route once daily.  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  Qty: 2 mL, Refills: 3    Associated Diagnoses: Type 2 diabetes mellitus with stage 3 chronic kidney disease and hypertension      FLUZONE HIGHDOSE QUAD 22-23  mcg/0.7 mL Syrg       glucagon (GVOKE HYPOPEN 2-PACK) 1 mg/0.2 mL AtIn Inject 1 Package into the skin as needed.  Qty: 2 Syringe, Refills: 0    Associated Diagnoses: Type 2 diabetes mellitus with stage 3 chronic kidney disease and hypertension      lancets Misc 1 lancet by Misc.(Non-Drug; Combo Route) route once daily.  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      nitroGLYCERIN (NITROSTAT) " 0.4 MG SL tablet Place 1 tablet (0.4 mg total) under the tongue every 5 (five) minutes as needed for Chest pain.  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 Monday AND THURSDAYS ONLY       !! - Potential duplicate medications found. Please discuss with provider.          I certify that this patient is confined to her home and needs physical therapy.

## 2023-03-13 ENCOUNTER — CLINICAL SUPPORT (OUTPATIENT)
Dept: ORTHOPEDICS | Facility: CLINIC | Age: 78
End: 2023-03-13
Payer: MEDICARE

## 2023-03-13 ENCOUNTER — DOCUMENTATION ONLY (OUTPATIENT)
Dept: REHABILITATION | Facility: HOSPITAL | Age: 78
End: 2023-03-13

## 2023-03-13 ENCOUNTER — TELEPHONE (OUTPATIENT)
Dept: ORTHOPEDICS | Facility: CLINIC | Age: 78
End: 2023-03-13
Payer: MEDICARE

## 2023-03-13 DIAGNOSIS — Z96.659 STATUS POST KNEE REPLACEMENT, UNSPECIFIED LATERALITY: Primary | ICD-10-CM

## 2023-03-13 DIAGNOSIS — Z96.651 S/P TOTAL KNEE REPLACEMENT USING CEMENT, RIGHT: Primary | ICD-10-CM

## 2023-03-13 PROCEDURE — 99499 UNLISTED E&M SERVICE: CPT | Mod: 95,,, | Performed by: ORTHOPAEDIC SURGERY

## 2023-03-13 PROCEDURE — 99499 NO LOS: ICD-10-PCS | Mod: 95,,, | Performed by: ORTHOPAEDIC SURGERY

## 2023-03-13 NOTE — DISCHARGE SUMMARY
[facility]  Orthopedics  Discharge Summary      Patient Name: Kaylee Romero  MRN: [id number]  Admission Date: 3/9/2023  Hospital Length of Stay: 0 days  Discharge Date and Time: 3/12/2023  6:46 PM  Attending Physician: Vidhi att. providers found   Discharging Provider: Wilian Romero MD  Primary Care Provider: Liz Roberts MD    HPI:   CC:  Right knee pain     Kaylee Romero is a 78 y.o. female with history of Right knee pain. Pain is worse with activity and weight bearing.  Patient has experienced interference of activities of daily living due to decreased range of motion and an increase in joint pain and swelling.  Patient has failed non-operative treatment including NSAIDs, corticosteroid injections, viscosupplement injections, and activity modification.  Kaylee Romero currently ambulates using assistive device .      Relevant medical conditions of significance in perioperative period:  DM- on medication managed by pcp  HTN- on medication managed by pcp  CAD with history of CABG and stents  HLD- on medication managed by pcp      Procedure(s) (LRB):  ARTHROPLASTY, KNEE, TOTAL:RIGHT (Right)      Hospital Course:  On 3/9/2023, the patient arrived to [facility] for proper pre-operative management.  Upon completion of pre-operative preparation, the patient was taken back to the operative theatre. R TKA was performed without complication and the patient was transported to the post anesthesia care unit in stable condition.  After appropriate recovery from the anaesthetic agents used during the surgery, the patient was then transported to the hospital inpatient floor.  The interim of the hospital stay from arrival on the floor up to discharge has been uncomplicated. The patient has tolerated regular diet.  The patient's pain has been controlled using a multimodal approach. Currently, the patient's pain is well controlled on an oral regimen.  The patient has been voiding without difficulty.  The patient  "began participation in physical therapy after surgery and has progressed throughout the entire hospital stay.  Currently, the patient's progress is sufficient to allow the them to be discharged to home safely.  The patient agrees with this assessment and desires a discharge today.        Goals of Care Treatment Preferences:  Code Status: Full Code      Consults (From admission, onward)        Status Ordering Provider     Inpatient consult to Social Work  Once        Provider:  (Not yet assigned)    MORA Dowd          Significant Diagnostic Studies: No pertinent studies.    Pending Diagnostic Studies:     None        Final Active Diagnoses:    Diagnosis Date Noted POA    PRINCIPAL PROBLEM:  Primary osteoarthritis of right knee [M17.11] 07/26/2012 Yes    Coronary 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 pectoris [I25.118] 08/12/2019 Yes    Type 2 diabetes mellitus with stage 3 chronic kidney disease, without long-term current use of insulin [E11.22, N18.30] 05/16/2019 Yes     Chronic    Physical deconditioning [R53.81] 11/05/2018 Yes    KAMRAN on CPAP [G47.33, Z99.89] 06/28/2016 Not Applicable    Dyslipidemia, goal LDL below 70 [E78.5] 12/02/2014 Yes    Essential hypertension [I10] 11/28/2012 Yes     Chronic      Problems Resolved During this Admission:      Discharged Condition: good    Disposition: Home or Self Care    Follow Up:    Patient Instructions:      WALKER FOR HOME USE     Order Specific Question Answer Comments   Type of Walker: Adult (5'4"-6'6")    With wheels? Yes    Height: 5' 2" (1.575 m)    Weight: 106.6 kg (235 lb 0.2 oz)    Length of need (1-99 months): 99    Please check all that apply: Patient's condition impairs ambulation.    Please check all that apply: Patient is unable to safely ambulate without equipment.      Medications:  Reconciled Home Medications:      Medication List      START taking these medications    cefadroxil 500 MG Cap  Commonly " known as: DURICEF  Take 1 capsule (500 mg total) by mouth every 12 (twelve) hours.     celecoxib 200 MG capsule  Commonly known as: CeleBREX  Take 1 capsule (200 mg total) by mouth once daily.     esomeprazole 20 MG capsule  Commonly known as: NEXIUM  Take 1 capsule (20 mg total) by mouth before breakfast. for 20 days     methocarbamoL 500 MG Tab  Commonly known as: ROBAXIN  Take 1 tablet (500 mg total) by mouth 4 (four) times daily. for 10 days     oxyCODONE 5 MG immediate release tablet  Commonly known as: ROXICODONE  Take 1 tablet (5 mg total) by mouth every 4 (four) hours as needed for Pain.        CHANGE how you take these medications    * acetaminophen 500 MG tablet  Commonly known as: TYLENOL  Take 1 tablet (500 mg total) by mouth every 6 (six) hours as needed for Pain.  What changed: You were already taking a medication with the same name, and this prescription was added. Make sure you understand how and when to take each.     * acetaminophen 500 MG tablet  Commonly known as: TYLENOL  Take 500 mg by mouth 2 (two) times daily as needed for Pain.  What changed: Another medication with the same name was added. Make sure you understand how and when to take each.     * aspirin 81 MG Chew  Take 1 tablet (81 mg total) by mouth once daily.  What changed: Another medication with the same name was added. Make sure you understand how and when to take each.  Notes to patient: Take 81mg ASA twice daily for 6 weeks then resume regular home regimen      * aspirin 81 MG EC tablet  Commonly known as: ECOTRIN  Take 1 tablet (81 mg total) by mouth 2 (two) times a day.  What changed: You were already taking a medication with the same name, and this prescription was added. Make sure you understand how and when to take each.     TRULICITY 4.5 mg/0.5 mL pen injector  Generic drug: dulaglutide  Inject 4.5 mg into the skin every 7 days.  What changed: when to take this         * This list has 4 medication(s) that are the same as  "other medications prescribed for you. Read the directions carefully, and ask your doctor or other care provider to review them with you.            CONTINUE taking these medications    alendronate 70 MG tablet  Commonly known as: FOSAMAX  TAKE 1 TABLET(70 MG) BY MOUTH EVERY 7 DAYS  Notes to patient: Hold for 1 month after surgery      amLODIPine 10 MG tablet  Commonly known as: NORVASC  TAKE 1 TABLET(10 MG) BY MOUTH EVERY DAY     ammonium lactate 12 % lotion  Commonly known as: LAC-HYDRIN  Apply topically as needed for Dry Skin. On the feet and toenails.     atorvastatin 80 MG tablet  Commonly known as: LIPITOR  TAKE 1 TABLET(80 MG) BY MOUTH EVERY DAY     BASAGLAR KWIKPEN U-100 INSULIN SUBQ  Inject 22 Units into the skin every evening.     BD BOO 2ND GEN PEN NEEDLE 32 gauge x 5/32" Ndle  Generic drug: pen needle, diabetic  USE EVERY DAY     blood sugar diagnostic Strp  1 strip by Misc.(Non-Drug; Combo Route) route once daily.     carvediloL 25 MG tablet  Commonly known as: COREG  TAKE 1 TABLET(25 MG) BY MOUTH TWICE DAILY WITH MEALS     clopidogreL 75 mg tablet  Commonly known as: PLAVIX  TAKE 1 TABLET(75 MG) BY MOUTH EVERY DAY     COD LIVER OIL ORAL  TAKE 2 CAPSULES BY MOUTH EVERY MORNING AND 1 CAPSULE EVERY EVENING     diphenoxylate-atropine 2.5-0.025 mg 2.5-0.025 mg per tablet  Commonly known as: LOMOTIL  TAKE 1 TABLET BY MOUTH FOUR TIMES DAILY AS NEEDED FOR DIARRHEA     FeroSuL 325 mg (65 mg iron) Tab tablet  Generic drug: ferrous sulfate  TAKE 1 TABLET BY MOUTH DAILY     FLUZONE HIGHDOSE QUAD 22-23  mcg/0.7 mL Syrg  Generic drug: flu vacc [id number](65yr up)-PF     furosemide 20 MG tablet  Commonly known as: LASIX  TAKE 1 TABLET BY MOUTH ON SATURDAY, SUNDAY, MONDAY, WEDNESDAY AND FRIDAY     glipiZIDE 5 MG tablet  Commonly known as: GLUCOTROL  TAKE 2 TABLETS BY MOUTH WITH DINNER OR EVENING MEAL     GVOKE HYPOPEN 2-PACK 1 mg/0.2 mL Atin  Generic drug: glucagon  Inject 1 Package into the skin as needed.   "   irbesartan 300 MG tablet  Commonly known as: AVAPRO  TAKE 1 TABLET(300 MG) BY MOUTH EVERY EVENING     isosorbide mononitrate 30 MG 24 hr tablet  Commonly known as: IMDUR  Take 2 tablets (60 mg total) by mouth once daily.     lancets Misc  1 lancet by Misc.(Non-Drug; Combo Route) route once daily.     multivitamin per tablet  Commonly known as: THERAGRAN  Take 1 tablet by mouth once daily.     nitroGLYCERIN 0.4 MG SL tablet  Commonly known as: NITROSTAT  Place 1 tablet (0.4 mg total) under the tongue every 5 (five) minutes as needed for Chest pain.     PFIZER COVID BIVAL(12Y UP)(PF) 30 mcg/0.3 mL injection  Generic drug: sars-cov-2 (covid-19 pfizer omicron)     SHINGRIX (PF) 50 mcg/0.5 mL injection  Generic drug: varicella-zoster [id number] (PF)     vitamin D 1000 units Tab  Commonly known as: VITAMIN D3  Take 1,000 Units by mouth twice a week. Monday AND THURSDAYS ONLY        ASK your doctor about these medications    diclofenac sodium 1 % Gel  Commonly known as: VOLTAREN  Apply 2 g topically 4 (four) times daily as needed. To painful area on the feet.            Wilian Romero MD  Orthopedics  [facility] - Surgery

## 2023-03-13 NOTE — PROGRESS NOTES
I called the patient today regarding her surgery with Dr. Peterson Sharma . The patient had a right TKA on 3/9.     Pain Scale: 8 / 10    Any issues with Fever: No.    Any issues with medications (specifically DVT prophylaxis): No. ASA 81 BID plus Plavix daily    Any issues with bowel movements:  Passing qasim: No.                                                                 Urination: No.                                                                 Constipation: No. Last BM 3/12, pt uses prunes    Completing at home exercises: Yes:     Any concerns regarding their dressing/bandage:  No.    Patient confirmed first HH-PT appointment:  HHPT on  Order entered today at n/a- pt asked for HHPT during virtual visit, Dr. Sharma okay with it, pt will do exercises until HHPT starts.     Any other concerns: Yes:  pain, pt may take 1-2 OXY Q 4-6 hours per Abbey NP        The patient was informed that if they have any urgent issues with their bandage, medications or any other health concerns regarding their surgery to call the 24/7 Orthopedic Post-op Hot Line at [phone]. The patient was reminded that if they have any chest pain or shortness of breath to call 911 or go to the ER.    The patient verbalized understanding and does not have any other questions

## 2023-03-13 NOTE — PROGRESS NOTES
Pt called the joint hotline and is unable to get to outpatient PT. Dr. Edith altman'd [facility], so orders were entered.

## 2023-03-13 NOTE — TELEPHONE ENCOUNTER
Spoke to pt, informed she may have HHPT for 2 weeks per Dr. Sharma, the orders have been entered and it may take a couple days. Also confirmed with Abbey NP that she is to take her Plavix  and ASA 81 BID, pt states her pain is not controlled. Informed pt she mat take 1-2 OXY every 4-6 hour per Abbey MORALES. Pt pleased and verbalized understanding.

## 2023-03-13 NOTE — CARE UPDATE
03/13/2023    Called and spoke to patient about Nimbus PNC.  Pain well controlled.  Denies tinnitus, metallic taste in mouth, weakness in extremity.  Denies erythema, warmth, tenderness, bleeding at site of catheter entry.  Dressing c/d/i.  All questions answered.  Encouraged them to call the provided number if any issues arise.  Nimbus to be discontinued 1 days from today.    Xavier Swan MD  Anesthesia CA-3/PGY-4

## 2023-03-13 NOTE — PROGRESS NOTES
Ms. Romero called in to cancel her initial evaluation for 1 pm today. Patient is 4 days s/p Right Total Knee Arthroplasty and was recently released from the hospital.     She is in too much pain to ambulate from her home through her 4 steps and attempt ours here in the clinic. Ms. Romero believes that she cannot perform Physical Therapy in the outpatient setting at this time and will be reaching out to her provider to refer her to Home Health.     Dr. Sharma and their team have been notified about Ms. Romero's wishes.

## 2023-03-14 ENCOUNTER — PATIENT MESSAGE (OUTPATIENT)
Dept: ORTHOPEDICS | Facility: CLINIC | Age: 78
End: 2023-03-14
Payer: MEDICARE

## 2023-03-14 ENCOUNTER — PATIENT MESSAGE (OUTPATIENT)
Dept: ENDOCRINOLOGY | Facility: CLINIC | Age: 78
End: 2023-03-14
Payer: MEDICARE

## 2023-03-15 PROCEDURE — G0179 MD RECERTIFICATION HHA PT: HCPCS | Mod: ,,, | Performed by: ORTHOPAEDIC SURGERY

## 2023-03-15 PROCEDURE — G0179 PR HOME HEALTH MD RECERTIFICATION: ICD-10-PCS | Mod: ,,, | Performed by: ORTHOPAEDIC SURGERY

## 2023-03-15 NOTE — TELEPHONE ENCOUNTER
I have informed Kaylee Romero she has been approved in the [facility] Patient Assistance Program through 12/31/23. A 120 day supply of Trulicity will be shipped to [facility] HOME in 10-14 business.  Patient has 3 refills remaining and enrolled in auto-refills.  Updated proof of eligibility is required to re-enroll for 2024 calendar year.

## 2023-03-17 ENCOUNTER — PATIENT MESSAGE (OUTPATIENT)
Dept: ORTHOPEDICS | Facility: CLINIC | Age: 78
End: 2023-03-17
Payer: MEDICARE

## 2023-03-23 ENCOUNTER — OFFICE VISIT (OUTPATIENT)
Dept: ORTHOPEDICS | Facility: CLINIC | Age: 78
End: 2023-03-23
Payer: MEDICARE

## 2023-03-23 VITALS — DIASTOLIC BLOOD PRESSURE: 64 MMHG | SYSTOLIC BLOOD PRESSURE: 109 MMHG | HEART RATE: 86 BPM | TEMPERATURE: 97 F

## 2023-03-23 DIAGNOSIS — Z96.651 S/P TOTAL KNEE REPLACEMENT USING CEMENT, RIGHT: Primary | ICD-10-CM

## 2023-03-23 PROCEDURE — 1159F PR MEDICATION LIST DOCUMENTED IN MEDICAL RECORD: ICD-10-PCS | Mod: CPTII,S$GLB,, | Performed by: PHYSICIAN ASSISTANT

## 2023-03-23 PROCEDURE — 3078F DIAST BP <80 MM HG: CPT | Mod: CPTII,S$GLB,, | Performed by: PHYSICIAN ASSISTANT

## 2023-03-23 PROCEDURE — 3074F PR MOST RECENT SYSTOLIC BLOOD PRESSURE < 130 MM HG: ICD-10-PCS | Mod: CPTII,S$GLB,, | Performed by: PHYSICIAN ASSISTANT

## 2023-03-23 PROCEDURE — 1160F PR REVIEW ALL MEDS BY PRESCRIBER/CLIN PHARMACIST DOCUMENTED: ICD-10-PCS | Mod: CPTII,S$GLB,, | Performed by: PHYSICIAN ASSISTANT

## 2023-03-23 PROCEDURE — 1160F RVW MEDS BY RX/DR IN RCRD: CPT | Mod: CPTII,S$GLB,, | Performed by: PHYSICIAN ASSISTANT

## 2023-03-23 PROCEDURE — 1125F PR PAIN SEVERITY QUANTIFIED, PAIN PRESENT: ICD-10-PCS | Mod: CPTII,S$GLB,, | Performed by: PHYSICIAN ASSISTANT

## 2023-03-23 PROCEDURE — 3072F PR LOW RISK FOR RETINOPATHY: ICD-10-PCS | Mod: CPTII,S$GLB,, | Performed by: PHYSICIAN ASSISTANT

## 2023-03-23 PROCEDURE — 1125F AMNT PAIN NOTED PAIN PRSNT: CPT | Mod: CPTII,S$GLB,, | Performed by: PHYSICIAN ASSISTANT

## 2023-03-23 PROCEDURE — 99999 PR PBB SHADOW E&M-EST. PATIENT-LVL IV: CPT | Mod: PBBFAC,,, | Performed by: PHYSICIAN ASSISTANT

## 2023-03-23 PROCEDURE — 3072F LOW RISK FOR RETINOPATHY: CPT | Mod: CPTII,S$GLB,, | Performed by: PHYSICIAN ASSISTANT

## 2023-03-23 PROCEDURE — 3074F SYST BP LT 130 MM HG: CPT | Mod: CPTII,S$GLB,, | Performed by: PHYSICIAN ASSISTANT

## 2023-03-23 PROCEDURE — 3078F PR MOST RECENT DIASTOLIC BLOOD PRESSURE < 80 MM HG: ICD-10-PCS | Mod: CPTII,S$GLB,, | Performed by: PHYSICIAN ASSISTANT

## 2023-03-23 PROCEDURE — 1159F MED LIST DOCD IN RCRD: CPT | Mod: CPTII,S$GLB,, | Performed by: PHYSICIAN ASSISTANT

## 2023-03-23 PROCEDURE — 99024 POSTOP FOLLOW-UP VISIT: CPT | Mod: S$GLB,,, | Performed by: PHYSICIAN ASSISTANT

## 2023-03-23 PROCEDURE — 99024 PR POST-OP FOLLOW-UP VISIT: ICD-10-PCS | Mod: S$GLB,,, | Performed by: PHYSICIAN ASSISTANT

## 2023-03-23 PROCEDURE — 99999 PR PBB SHADOW E&M-EST. PATIENT-LVL IV: ICD-10-PCS | Mod: PBBFAC,,, | Performed by: PHYSICIAN ASSISTANT

## 2023-03-27 ENCOUNTER — TELEPHONE (OUTPATIENT)
Dept: ORTHOPEDICS | Facility: CLINIC | Age: 78
End: 2023-03-27
Payer: MEDICARE

## 2023-03-27 NOTE — TELEPHONE ENCOUNTER
Spoke to pt, informed Dr. Sharma said okay for her to extend HHPT for 2 more weeks, pt very pleased and verbalized understanding.

## 2023-03-27 NOTE — TELEPHONE ENCOUNTER
Pt called hotline stating she would like to continue HHPT for another 2 weeks. I asked the reason and she states because of her slow progress, pain and issues.  Informed message will be forwarded to Dr. Sharma to advise. Pt pleased and verbalized understanding.

## 2023-03-30 ENCOUNTER — TELEPHONE (OUTPATIENT)
Dept: ORTHOPEDICS | Facility: CLINIC | Age: 78
End: 2023-03-30
Payer: MEDICARE

## 2023-03-30 NOTE — TELEPHONE ENCOUNTER
Spoke to Martina with  medical team and gave verbal order to extend HHPT for 2 more weeks per Dr. Sharma. Understanding verbalized.

## 2023-04-01 ENCOUNTER — PATIENT MESSAGE (OUTPATIENT)
Dept: CARDIOLOGY | Facility: CLINIC | Age: 78
End: 2023-04-01
Payer: MEDICARE

## 2023-04-05 ENCOUNTER — PATIENT MESSAGE (OUTPATIENT)
Dept: ENDOCRINOLOGY | Facility: CLINIC | Age: 78
End: 2023-04-05
Payer: MEDICARE

## 2023-04-12 ENCOUNTER — OFFICE VISIT (OUTPATIENT)
Dept: SLEEP MEDICINE | Facility: CLINIC | Age: 78
End: 2023-04-12
Payer: MEDICARE

## 2023-04-12 VITALS
HEART RATE: 93 BPM | SYSTOLIC BLOOD PRESSURE: 109 MMHG | BODY MASS INDEX: 43.24 KG/M2 | DIASTOLIC BLOOD PRESSURE: 63 MMHG | HEIGHT: 62 IN | WEIGHT: 235 LBS

## 2023-04-12 DIAGNOSIS — I25.2 HISTORY OF MI (MYOCARDIAL INFARCTION): ICD-10-CM

## 2023-04-12 DIAGNOSIS — G47.33 OSA ON CPAP: Primary | ICD-10-CM

## 2023-04-12 DIAGNOSIS — I10 ESSENTIAL HYPERTENSION: Chronic | ICD-10-CM

## 2023-04-12 PROCEDURE — 99214 PR OFFICE/OUTPT VISIT, EST, LEVL IV, 30-39 MIN: ICD-10-PCS | Mod: S$GLB,,, | Performed by: NURSE PRACTITIONER

## 2023-04-12 PROCEDURE — 3288F PR FALLS RISK ASSESSMENT DOCUMENTED: ICD-10-PCS | Mod: CPTII,S$GLB,, | Performed by: NURSE PRACTITIONER

## 2023-04-12 PROCEDURE — 1101F PR PT FALLS ASSESS DOC 0-1 FALLS W/OUT INJ PAST YR: ICD-10-PCS | Mod: CPTII,S$GLB,, | Performed by: NURSE PRACTITIONER

## 2023-04-12 PROCEDURE — 3288F FALL RISK ASSESSMENT DOCD: CPT | Mod: CPTII,S$GLB,, | Performed by: NURSE PRACTITIONER

## 2023-04-12 PROCEDURE — 99999 PR PBB SHADOW E&M-EST. PATIENT-LVL IV: ICD-10-PCS | Mod: PBBFAC,,, | Performed by: NURSE PRACTITIONER

## 2023-04-12 PROCEDURE — 1125F AMNT PAIN NOTED PAIN PRSNT: CPT | Mod: CPTII,S$GLB,, | Performed by: NURSE PRACTITIONER

## 2023-04-12 PROCEDURE — 3078F PR MOST RECENT DIASTOLIC BLOOD PRESSURE < 80 MM HG: ICD-10-PCS | Mod: CPTII,S$GLB,, | Performed by: NURSE PRACTITIONER

## 2023-04-12 PROCEDURE — 3072F LOW RISK FOR RETINOPATHY: CPT | Mod: CPTII,S$GLB,, | Performed by: NURSE PRACTITIONER

## 2023-04-12 PROCEDURE — 1159F PR MEDICATION LIST DOCUMENTED IN MEDICAL RECORD: ICD-10-PCS | Mod: CPTII,S$GLB,, | Performed by: NURSE PRACTITIONER

## 2023-04-12 PROCEDURE — 3074F PR MOST RECENT SYSTOLIC BLOOD PRESSURE < 130 MM HG: ICD-10-PCS | Mod: CPTII,S$GLB,, | Performed by: NURSE PRACTITIONER

## 2023-04-12 PROCEDURE — 3078F DIAST BP <80 MM HG: CPT | Mod: CPTII,S$GLB,, | Performed by: NURSE PRACTITIONER

## 2023-04-12 PROCEDURE — 99214 OFFICE O/P EST MOD 30 MIN: CPT | Mod: S$GLB,,, | Performed by: NURSE PRACTITIONER

## 2023-04-12 PROCEDURE — 3074F SYST BP LT 130 MM HG: CPT | Mod: CPTII,S$GLB,, | Performed by: NURSE PRACTITIONER

## 2023-04-12 PROCEDURE — 99999 PR PBB SHADOW E&M-EST. PATIENT-LVL IV: CPT | Mod: PBBFAC,,, | Performed by: NURSE PRACTITIONER

## 2023-04-12 PROCEDURE — 1159F MED LIST DOCD IN RCRD: CPT | Mod: CPTII,S$GLB,, | Performed by: NURSE PRACTITIONER

## 2023-04-12 PROCEDURE — 3072F PR LOW RISK FOR RETINOPATHY: ICD-10-PCS | Mod: CPTII,S$GLB,, | Performed by: NURSE PRACTITIONER

## 2023-04-12 PROCEDURE — 1125F PR PAIN SEVERITY QUANTIFIED, PAIN PRESENT: ICD-10-PCS | Mod: CPTII,S$GLB,, | Performed by: NURSE PRACTITIONER

## 2023-04-12 PROCEDURE — 1101F PT FALLS ASSESS-DOCD LE1/YR: CPT | Mod: CPTII,S$GLB,, | Performed by: NURSE PRACTITIONER

## 2023-04-12 NOTE — PROGRESS NOTES
"  Cc: KAMRAN     She uses aPAP therapy faithfully. He snow is very old though and has filter compartment taped. She is unable to sleep w/o her machine. Using nasal mask /Eson and chin strap. Continued resolution of air gasping and snoring.   BP stable    Interrogation: Resmed airsense 10 180/180d used, avg 10.5h/n AHI 1.4, 90% tile 14.7cm      PSG 2005 RDI 34.9/low sat 82%, ent eval may be needed if mouth leak persists, cpap 11cm  Titration study 7/26/16: effective supine REM 11-14cm    /63 (BP Location: Left arm, Patient Position: Sitting, BP Method: Medium (Automatic))   Pulse 93   Ht 5' 2" (1.575 m)   Wt 106.6 kg (235 lb 0.2 oz)   LMP 01/09/2001 (Approximate)   BMI 42.98 kg/m²     ASSESSMENT:   KAMRAN, severe.  Remains adherent with CPAP, AHI<5. Benefits. Eligible new machine   She has medical comorbidities of obesity, hypertension DM2.  Warrants continued definitive treatment for sleep apnea.     PLAN:   1. Adjust today apap 11-16cm. [facility] DME new machine. Discussed control of KAMRAN  See pcp re htn mgt/continue meds    "

## 2023-04-14 ENCOUNTER — CLINICAL SUPPORT (OUTPATIENT)
Dept: REHABILITATION | Facility: HOSPITAL | Age: 78
End: 2023-04-14
Attending: ORTHOPAEDIC SURGERY
Payer: MEDICARE

## 2023-04-14 DIAGNOSIS — R26.89 GAIT, ANTALGIC: ICD-10-CM

## 2023-04-14 DIAGNOSIS — M25.661 DECREASED RANGE OF MOTION OF RIGHT KNEE: Primary | ICD-10-CM

## 2023-04-14 PROCEDURE — 97110 THERAPEUTIC EXERCISES: CPT | Mod: PN

## 2023-04-14 PROCEDURE — 97014 ELECTRIC STIMULATION THERAPY: CPT | Mod: PN

## 2023-04-14 NOTE — PROGRESS NOTES
Physical Therapy Daily Treatment Note     Name: Kaylee Romero  Clinic Number: [id number]    Therapy Diagnosis:        Encounter Diagnosis   Name Primary?    Primary osteoarthritis of right knee       Physician: Peterson Sharma MD    Visit Date: 2023     Physician Orders: PT Eval and Treat   Medical Diagnosis from Referral: M17.11 (ICD-10-CM) - Primary osteoarthritis of right knee  Evaluation Date: 3/2/2023  Plan of Care Expiration: 23 or 24th Visit  Authorization Period Expiration: 3/30/23  Visit # / Visits authorized:   Remaining Visits Scheduled - 00    Eval Visit FOTO-  (Date/Score/Turn Green)   5th Visit FOTO   -  (Date/Score/Turn Green)   10th Visit FOTO  -  (Date/Score/Turn Green)   D/C FOTO          -  (Date/Score/Turn Green)      Time In: 12:30 PM  Time Out: 1:24 PM  Billable Time: 54 minutes (3TE, 1 unattended estim)  Non-Billable Time: 00 minutes    Precautions: Standard and Fall  Insurance: Payor: [employer] / Plan: [employer] 65 / Product Type: Medicare Advantage /     Subjective     Pt reports: having had home health therapy the past few weeks due to not being able to manage the stairs at her house and our clinic. She now presents with continued R knee pain and functional limitation.     She is compliant with home exercise program.  Response to previous treatment: Home health services have been her previous treatments    Pre-Treatment Pain Ratin/10  Post-Treatment Pain Ratin/10  Location: right knee      Objective        Range of Motion/Strength:      Knee Left Right Pain/Dysfunction with Movement   AROM         Knee Flexion (140)  105 °   90 °  Pain    Knee Extension (0)  0 °   0 °  Pain        Kaylee received therapeutic exercises to develop strength, ROM, and flexibility for 46 minutes including:    ROM assessment  Warm-up    Supine    Quad Sets with towel right LE - 30 reps with 5 sec. Hold   Heel slides with strap - 5 minutes  SLR rightLE   - 3x5 reps     Hip Abduction with green TB and Adduction - 30 reps   Prone HS curl with right LE - 30 reps   Patella Mob - 1' in each direction    Seated    Marching in place 2x10 B sides  Hip abduction 2x10 B sides  Hip adduction w/ball 2-3'' hold 2x10    Standing        *Bold exercise performed     Kaylee received the following supervised modalities after being cleared for contradictions: IFC Electrical Stimulation:  Kaylee received IFC Electrical Stimulation for pain control applied to the R knee. Pt received stimulation at 10V at a carrier frequency of 4000Hz through 80-150Hz for 08 minutes. Kaylee tolderated treatment well without any adverse effects.        Home Exercises Provided and Patient Education Provided     Education provided:   - Continue with HEP    Written Home Exercises Provided: Patient instructed to cont prior HEP.  Exercises were reviewed and Kaylee was able to demonstrate them prior to the end of the session.  Kaylee demonstrated good  understanding of the education provided.     See EMR under Patient Instructions for exercises provided prior visit.    Assessment     Pt currently reports displaying continued R knee pain. Patient did not tolerate end range of motion at R knee well and had increase in pain. Patient reports not having taken her pain medication prior to treatment session. Pt required an increase in verbal and tactile cueing, as well as increased time throughout interventions during today's treatment session due to pain.  Pt tolerated treatment session well and will continue to benefit from skilled therapy to address deficits. Pt educated to continue HEP to improve progression. Continue current treatment plan with emphasis on pain control, functional strengthening, and functional mobility training to accomplish noted goals and return patient to OF.    Kaylee Is progressing well towards Her goals.     Pt prognosis is Good.     Pt will continue to benefit from skilled outpatient physical  therapy to address the deficits listed in the problem list box on initial evaluation, provide pt/family education and to maximize pt's level of independence in the home and community environment.     Pt's spiritual, cultural and educational needs considered and pt agreeable to plan of care and goals.    Anticipated barriers to physical therapy: PMH    Goals:    Short Term Goals: 6 weeks  Goal   Status    Pt. to report decreased right knee pain  </= 5/10 at worst to increase tolerance for prolong standing      Pt. to demonstrate proper gait mechanics requiring minimum verbal cues from PT     Pt. to demonstrate an increase right knee AROM to 95 degrees to improve ease with stair negotiation.     Pt to be Independent with HEP to improve ROM and independence with ADL's           Long Term Goals: 12 weeks  Goal Status    Pt. to report decreased right knee pain  </= 2/10 at worst to increase tolerance for prolong standing      Pt. to demonstrate proper gait mechanics requiring no verbal cues from PT     Pt. to demonstrate an increase right knee AROM to >110 degrees to improve ease with stair negotiation.     Pt. to demonstrate increased MMT for right hip abductors to 4/5  to increase stability with ambulation on even surfaces.     Pt. to demonstrate increased MMT for right quadriceps to 4/5 to increase ability to negotiate stairs     Pt. to demonstrate increased MMT for right4 hamstring  to 4/5 to increase tolerance to squatting     Pt to be Independent with HEP to improve ROM and independence with ADL's        Plan     Continued with current POC      Bryan Brown, PT ,DPT  4/14/2023

## 2023-04-17 ENCOUNTER — CLINICAL SUPPORT (OUTPATIENT)
Dept: REHABILITATION | Facility: HOSPITAL | Age: 78
End: 2023-04-17
Attending: ORTHOPAEDIC SURGERY
Payer: MEDICARE

## 2023-04-17 ENCOUNTER — EXTERNAL HOME HEALTH (OUTPATIENT)
Dept: HOME HEALTH SERVICES | Facility: HOSPITAL | Age: 78
End: 2023-04-17
Payer: MEDICARE

## 2023-04-17 DIAGNOSIS — R26.89 GAIT, ANTALGIC: ICD-10-CM

## 2023-04-17 DIAGNOSIS — M25.661 DECREASED RANGE OF MOTION OF RIGHT KNEE: Primary | ICD-10-CM

## 2023-04-17 PROCEDURE — 97110 THERAPEUTIC EXERCISES: CPT | Mod: PN,CQ

## 2023-04-17 NOTE — PROGRESS NOTES
Physical Therapy Daily Treatment Note     Name: Kaylee Romero  Clinic Number: [id number]    Therapy Diagnosis:        Encounter Diagnosis   Name Primary?    Primary osteoarthritis of right knee       Physician: Peterson Sharma MD    Visit Date: 2023     Physician Orders: PT Eval and Treat   Medical Diagnosis from Referral: M17.11 (ICD-10-CM) - Primary osteoarthritis of right knee  Evaluation Date: 3/2/2023  Plan of Care Expiration: 23 or  Visit  Authorization Period Expiration: 3/30/23  Visit # / Visits authorized:   Remaining Visits Scheduled -   PTA Visit #:     Eval Visit FOTO-  (Date/Score/Turn Green)   5th Visit FOTO   -  (Date/Score/Turn Green)   10th Visit FOTO  -  (Date/Score/Turn Green)   D/C FOTO          -  (Date/Score/Turn Green)      Time In: 3:15 PM  Time Out: 4:00 PM  Billable Time: 45 minutes  Non-Billable Time: 00 minutes    Precautions: Standard and Fall  Insurance: Payor: [employer] / Plan: [id number] / Product Type: Medicare Advantage /     Subjective     Pt reports: Some pain in the R knee and L achilles today     She is compliant with home exercise program.  Response to previous treatment: Home health services have been her previous treatments    Pre-Treatment Pain Ratin/10  Post-Treatment Pain Ratin/10  Location: right knee      Objective        Range of Motion/Strength:      Knee Left Right Pain/Dysfunction with Movement   AROM         Knee Flexion (140)  105 °   90 °  Pain    Knee Extension (0)  0 °   0 °  Pain        Kaylee received therapeutic exercises to develop strength, ROM, and flexibility for 45 minutes including:    ROM assessment  Warm-up    Supine    Quad Sets with towel right LE - 30 reps with 5 sec. Hold   Heel slides with strap - 5 minutes  SLR rightLE  - 3x5 reps (attempted but pt unable to perform)  Short Arc Quad 3x10 (blue bolster)  Long Arc Quad 3x10  Hip Abduction with green TB and Adduction - 30  reps   Prone HS curl with right LE - 30 reps   Patella Mob - 1' in each direction    Seated    Marching in place 2x10 B sides  Hip abduction 2x10 B sides  Hip adduction w/ball 2-3'' hold 2x10    Standing        *Bold exercise performed     Kaylee received the following supervised modalities after being cleared for contradictions: IFC Electrical Stimulation:  Kaylee received IFC Electrical Stimulation for pain control applied to the R knee. Pt received stimulation at 10V at a carrier frequency of 4000Hz through 80-150Hz for 00 minutes. Kaylee tolderated treatment well without any adverse effects.  - NP today (pt stated she's felt her nerves being stimulated since last session and she did not like e.stim)      Home Exercises Provided and Patient Education Provided     Education provided:   - Continue with HEP    Written Home Exercises Provided: Patient instructed to cont prior HEP.  Exercises were reviewed and Kaylee was able to demonstrate them prior to the end of the session.  Kaylee demonstrated good  understanding of the education provided.     See EMR under Patient Instructions for exercises provided prior visit.    Assessment     Pt tolerated session fairly well. She presented showing signs of fatigue and reported being unable to sleep last night secondary to knee pain. Verbal cues given to improve quad contraction during Long Arc Quad and Short Arc Quad, pt did not demonstrate understanding. PTA educated pt on correctly using cane on her L side, pt declined education and stated she preferred using the cane on the right. Kaylee had no adverse effects with exercises today and will continue to benefit from skilled therapy.     Kaylee Is progressing well towards Her goals.     Pt prognosis is Good.     Pt will continue to benefit from skilled outpatient physical therapy to address the deficits listed in the problem list box on initial evaluation, provide pt/family education and to maximize pt's level of independence in  the home and community environment.     Pt's spiritual, cultural and educational needs considered and pt agreeable to plan of care and goals.    Anticipated barriers to physical therapy: PMH    Goals:    Short Term Goals: 6 weeks  Goal   Status    Pt. to report decreased right knee pain  </= 5/10 at worst to increase tolerance for prolong standing      Pt. to demonstrate proper gait mechanics requiring minimum verbal cues from PT     Pt. to demonstrate an increase right knee AROM to 95 degrees to improve ease with stair negotiation.     Pt to be Independent with HEP to improve ROM and independence with ADL's           Long Term Goals: 12 weeks  Goal Status    Pt. to report decreased right knee pain  </= 2/10 at worst to increase tolerance for prolong standing      Pt. to demonstrate proper gait mechanics requiring no verbal cues from PT     Pt. to demonstrate an increase right knee AROM to >110 degrees to improve ease with stair negotiation.     Pt. to demonstrate increased MMT for right hip abductors to 4/5  to increase stability with ambulation on even surfaces.     Pt. to demonstrate increased MMT for right quadriceps to 4/5 to increase ability to negotiate stairs     Pt. to demonstrate increased MMT for right4 hamstring  to 4/5 to increase tolerance to squatting     Pt to be Independent with HEP to improve ROM and independence with ADL's        Plan     Continued with current POC      Kim Carver, PTA ,  4/17/2023

## 2023-04-18 ENCOUNTER — CLINICAL SUPPORT (OUTPATIENT)
Dept: REHABILITATION | Facility: HOSPITAL | Age: 78
End: 2023-04-18
Attending: ORTHOPAEDIC SURGERY
Payer: MEDICARE

## 2023-04-18 DIAGNOSIS — R26.89 GAIT, ANTALGIC: ICD-10-CM

## 2023-04-18 DIAGNOSIS — M25.661 DECREASED RANGE OF MOTION OF RIGHT KNEE: Primary | ICD-10-CM

## 2023-04-18 PROCEDURE — 97110 THERAPEUTIC EXERCISES: CPT | Mod: PN,CQ

## 2023-04-18 NOTE — PROGRESS NOTES
Physical Therapy Daily Treatment Note     Name: Kaylee Romero  Clinic Number: [id number]    Therapy Diagnosis:        Encounter Diagnosis   Name Primary?    Primary osteoarthritis of right knee       Physician: Peterson Sharma MD    Visit Date: 2023     Physician Orders: PT Eval and Treat   Medical Diagnosis from Referral: M17.11 (ICD-10-CM) - Primary osteoarthritis of right knee  Evaluation Date: 3/2/2023  Plan of Care Expiration: 23 or  Visit  Authorization Period Expiration: 3/30/23  Visit # / Visits authorized: 3/ 20  Remaining Visits Scheduled - 18  PTA Visit #:     Eval Visit FOTO-  (Date/Score/Turn Green)   5th Visit FOTO   -  (Date/Score/Turn Green)   10th Visit FOTO  -  (Date/Score/Turn Green)   D/C FOTO          -  (Date/Score/Turn Green)      Time In: 3:15 PM  Time Out: 4:00 PM  Billable Time: 45 minutes  Non-Billable Time: 00 minutes    Precautions: Standard and Fall  Insurance: Payor: [employer] / Plan: [employer] 65 / Product Type: Medicare Advantage /     Subjective     Pt reports: Knee is feeling okay but some pain in chest. Pt forgot to take heart medication before session and went up steps into clinic    She is compliant with home exercise program.  Response to previous treatment: Home health services have been her previous treatments    Pre-Treatment Pain Ratin/10  Post-Treatment Pain Ratin/10  Location: right knee      Objective        Range of Motion/Strength:      Knee Left Right Pain/Dysfunction with Movement   AROM         Knee Flexion (140)  105 °   90 °  Pain    Knee Extension (0)  0 °   0 °  Pain        Kaylee received therapeutic exercises to develop strength, ROM, and flexibility for 45 minutes including:    ROM assessment  Warm-up    Supine    Quad Sets with towel right LE - 30 reps with 5 sec. Hold   Heel slides with strap - 5 minutes  SLR rightLE  - 3x5 reps (attempted but pt unable to perform)  Short Arc Quad 3x10 (blue  lynda)  Long Arc Quad 3x10  Hip Adduction - 30 reps   Prone HS curl with right LE - 30 reps   Patella Mob - 1' in each direction   Long sitting hip adduction 3x10    Seated    Marching in place 2x10 B sides  Hip abduction 2x10 B sides  Hip adduction w/ball 2-3'' hold 2x10  Heel slides seated - 5'   Ankle pumps x 30    Standing        *Bold exercise performed     Kaylee received the following supervised modalities after being cleared for contradictions: IFC Electrical Stimulation:  Kaylee received IFC Electrical Stimulation for pain control applied to the R knee. Pt received stimulation at 10V at a carrier frequency of 4000Hz through 80-150Hz for 00 minutes. Kaylee tolderated treatment well without any adverse effects.  - NP today (pt stated she's felt her nerves being stimulated since last session and she did not like e.stim)      Home Exercises Provided and Patient Education Provided     Education provided:   - Continue with HEP    Written Home Exercises Provided: Patient instructed to cont prior HEP.  Exercises were reviewed and Kaylee was able to demonstrate them prior to the end of the session.  Kaylee demonstrated good  understanding of the education provided.     See EMR under Patient Instructions for exercises provided prior visit.    Assessment     Pt tolerated session fairly well. She presented with chest pain after ambulating up the steps into the clinic. PTA encouraged pt to rest before performing exercises. Pt verbalized that she was ready to continue therapy. Seated heel slides tolerated well but resulted in fatigue. Pt then moved to mat for quad sets. Long sitting hip adduction performed, knee extension during exercise caused some pain behind knee due to hamstring and calf tightness. Pt tolerance for therapy very low today, PTA stressed the importance of pt taking necessary meds before therapy, pt verbalized understanding. Kaylee will continue to benefit from skilled therapy.     Kaylee Is progressing well  towards Her goals.     Pt prognosis is Good.     Pt will continue to benefit from skilled outpatient physical therapy to address the deficits listed in the problem list box on initial evaluation, provide pt/family education and to maximize pt's level of independence in the home and community environment.     Pt's spiritual, cultural and educational needs considered and pt agreeable to plan of care and goals.    Anticipated barriers to physical therapy: PMH    Goals:    Short Term Goals: 6 weeks  Goal   Status    Pt. to report decreased right knee pain  </= 5/10 at worst to increase tolerance for prolong standing   Progressing 4/18/2023     Pt. to demonstrate proper gait mechanics requiring minimum verbal cues from PT Progressing 4/18/2023      Pt. to demonstrate an increase right knee AROM to 95 degrees to improve ease with stair negotiation. Progressing 4/18/2023      Pt to be Independent with HEP to improve ROM and independence with ADL's Progressing 4/18/2023            Long Term Goals: 12 weeks  Goal Status    Pt. to report decreased right knee pain  </= 2/10 at worst to increase tolerance for prolong standing      Pt. to demonstrate proper gait mechanics requiring no verbal cues from PT     Pt. to demonstrate an increase right knee AROM to >110 degrees to improve ease with stair negotiation.     Pt. to demonstrate increased MMT for right hip abductors to 4/5  to increase stability with ambulation on even surfaces.     Pt. to demonstrate increased MMT for right quadriceps to 4/5 to increase ability to negotiate stairs     Pt. to demonstrate increased MMT for right4 hamstring  to 4/5 to increase tolerance to squatting     Pt to be Independent with HEP to improve ROM and independence with ADL's        Plan     Continued with current POC      Kim Carver PTA ,  4/18/2023

## 2023-04-20 ENCOUNTER — CLINICAL SUPPORT (OUTPATIENT)
Dept: REHABILITATION | Facility: HOSPITAL | Age: 78
End: 2023-04-20
Attending: ORTHOPAEDIC SURGERY
Payer: MEDICARE

## 2023-04-20 DIAGNOSIS — M25.661 DECREASED RANGE OF MOTION OF RIGHT KNEE: Primary | ICD-10-CM

## 2023-04-20 DIAGNOSIS — R26.89 GAIT, ANTALGIC: ICD-10-CM

## 2023-04-20 PROCEDURE — 97110 THERAPEUTIC EXERCISES: CPT | Mod: PN,CQ

## 2023-04-20 NOTE — PROGRESS NOTES
Physical Therapy Daily Treatment Note     Name: Kaylee Romero  Clinic Number: [id number]    Therapy Diagnosis:        Encounter Diagnosis   Name Primary?    Primary osteoarthritis of right knee       Physician: Peterson Sharma MD    Visit Date: 2023     Physician Orders: PT Eval and Treat   Medical Diagnosis from Referral: M17.11 (ICD-10-CM) - Primary osteoarthritis of right knee  Evaluation Date: 3/2/2023  Plan of Care Expiration: 23 or  Visit  Authorization Period Expiration: 3/30/23  Visit # / Visits authorized:   Remaining Visits Scheduled - 17  PTA Visit #: 2    Eval Visit FOTO-  (Date/Score/Turn Green)   5th Visit FOTO   -  (Date/Score/Turn Green)   10th Visit FOTO  -  (Date/Score/Turn Green)   D/C FOTO          -  (Date/Score/Turn Green)      Time In: 3:25 PM  Time Out: 4:05 PM  Billable Time: 40 minutes  Non-Billable Time: 00 minutes    Precautions: Standard and Fall  Insurance: Payor: [employer] / Plan: [email or web address] 65 / Product Type: Medicare Advantage /     Subjective     Pt reports: Knee is feeling okay today.     She is compliant with home exercise program.  Response to previous treatment: Home health services have been her previous treatments    Pre-Treatment Pain Ratin/10  Post-Treatment Pain Ratin/10  Location: right knee      Objective        Range of Motion/Strength:      Knee Left Right Pain/Dysfunction with Movement   AROM         Knee Flexion (140)  105 °   90 °  Pain    Knee Extension (0)  0 °   0 °  Pain        Kaylee received therapeutic exercises to develop strength, ROM, and flexibility for 45 minutes including:    ROM assessment  Warm-up    Supine    Quad Sets with towel right LE - 30 reps with 5 sec. Hold   Heel slides with strap - 5 minutes  SLR rightLE  - 3x5 reps (attempted but pt unable to perform)  Short Arc Quad 3x10 (blue bolster)  Long Arc Quad 3x10  Hip Adduction - 30 reps   Prone HS curl with right LE - 30 reps    Patella Mob - 1' in each direction   Long sitting hip adduction 3x10    Seated    Marching in place 2x10 B sides (40s march with 30s rest x 3)  Hip abduction 3x10 B sides  Hip adduction w/ball 2-3'' hold 3x10  Heel slides seated - 5'   Ankle pumps x 30  +Hamstring stretch 3x30s  Gastroc stretch 3x30s    Standing        *Bold exercise performed     Kaylee participated in neuromuscular re-education activities to improve: Balance, Coordination, Kinesthetic, and Proprioception for 00 minutes. The following activities were included:  +Romberg balance    +TKE in standing and Upper Extremity support  + Weight shifts    Kaylee participated in dynamic functional therapeutic activities to improve functional performance for 00  minutes, including:    Visit Number:  4 out of 20   Activities performed   (duration/resistance)     Sit to stand 2x5 - np                         Kaylee received the following supervised modalities after being cleared for contradictions: IFC Electrical Stimulation:  Kaylee received IFC Electrical Stimulation for pain control applied to the R knee. Pt received stimulation at 10V at a carrier frequency of 4000Hz through 80-150Hz for 00 minutes. Kaylee tolderated treatment well without any adverse effects.  - NP today (pt stated she's felt her nerves being stimulated since last session and she did not like e.stim)      Home Exercises Provided and Patient Education Provided     Education provided:   - Continue with HEP    Written Home Exercises Provided: Patient instructed to cont prior HEP.  Exercises were reviewed and Kaylee was able to demonstrate them prior to the end of the session.  Kaylee demonstrated good  understanding of the education provided.     See EMR under Patient Instructions for exercises provided prior visit.    Assessment     Pt tolerated session fairly well. Tolerance for exercise is very low, frequent rest breaks needed with heel slides. Pt is worried she is not performing enough  exercises, PTA educated her on the importance of rest breaks given her cardiac history, pt verbalized understanding. Gastroc stretch added, pt tolerated well. PTA educated pt on the importance of stretching the hamstring and gastroc to avoid pain from shortened muscles, pt verbalized partial understanding. She still ambulates with single point cane on R side with excessive hip ER and limited plantar/dorsiflexion. Seated marches challenging die to weak hip flexors. PROM measurements should be taken next session, pt may benefit from set with smaller reps to focus on quality of muscle contraction. Kaylee will continue to benefit from skilled therapy.     Kaylee Is progressing well towards Her goals.     Pt prognosis is Good.     Pt will continue to benefit from skilled outpatient physical therapy to address the deficits listed in the problem list box on initial evaluation, provide pt/family education and to maximize pt's level of independence in the home and community environment.     Pt's spiritual, cultural and educational needs considered and pt agreeable to plan of care and goals.    Anticipated barriers to physical therapy: PMH    Goals:    Short Term Goals: 6 weeks  Goal   Status    Pt. to report decreased right knee pain  </= 5/10 at worst to increase tolerance for prolong standing   Progressing 4/20/2023     Pt. to demonstrate proper gait mechanics requiring minimum verbal cues from PT Progressing 4/20/2023      Pt. to demonstrate an increase right knee AROM to 95 degrees to improve ease with stair negotiation. Progressing 4/20/2023      Pt to be Independent with HEP to improve ROM and independence with ADL's Progressing 4/20/2023            Long Term Goals: 12 weeks  Goal Status    Pt. to report decreased right knee pain  </= 2/10 at worst to increase tolerance for prolong standing      Pt. to demonstrate proper gait mechanics requiring no verbal cues from PT     Pt. to demonstrate an increase right knee AROM to  >110 degrees to improve ease with stair negotiation.     Pt. to demonstrate increased MMT for right hip abductors to 4/5  to increase stability with ambulation on even surfaces.     Pt. to demonstrate increased MMT for right quadriceps to 4/5 to increase ability to negotiate stairs     Pt. to demonstrate increased MMT for right4 hamstring  to 4/5 to increase tolerance to squatting     Pt to be Independent with HEP to improve ROM and independence with ADL's        Plan     Continued with current POC      Kim Carver PTA ,  4/20/2023

## 2023-04-21 ENCOUNTER — OFFICE VISIT (OUTPATIENT)
Dept: ORTHOPEDICS | Facility: CLINIC | Age: 78
End: 2023-04-21
Payer: MEDICARE

## 2023-04-21 ENCOUNTER — HOSPITAL ENCOUNTER (OUTPATIENT)
Dept: RADIOLOGY | Facility: HOSPITAL | Age: 78
Discharge: HOME | End: 2023-04-21
Attending: ORTHOPAEDIC SURGERY
Payer: MEDICARE

## 2023-04-21 VITALS — WEIGHT: 233.94 LBS | BODY MASS INDEX: 43.05 KG/M2 | HEIGHT: 62 IN

## 2023-04-21 DIAGNOSIS — Z96.651 STATUS POST RIGHT KNEE REPLACEMENT: ICD-10-CM

## 2023-04-21 DIAGNOSIS — Z96.651 S/P TOTAL KNEE REPLACEMENT USING CEMENT, RIGHT: Primary | ICD-10-CM

## 2023-04-21 PROCEDURE — 1101F PR PT FALLS ASSESS DOC 0-1 FALLS W/OUT INJ PAST YR: ICD-10-PCS | Mod: CPTII,S$GLB,, | Performed by: ORTHOPAEDIC SURGERY

## 2023-04-21 PROCEDURE — 1101F PT FALLS ASSESS-DOCD LE1/YR: CPT | Mod: CPTII,S$GLB,, | Performed by: ORTHOPAEDIC SURGERY

## 2023-04-21 PROCEDURE — 1126F AMNT PAIN NOTED NONE PRSNT: CPT | Mod: CPTII,S$GLB,, | Performed by: ORTHOPAEDIC SURGERY

## 2023-04-21 PROCEDURE — 73562 XR KNEE 3 VIEW RIGHT: ICD-10-PCS | Mod: 26,RT,, | Performed by: RADIOLOGY

## 2023-04-21 PROCEDURE — 73562 X-RAY EXAM OF KNEE 3: CPT | Mod: TC,RT

## 2023-04-21 PROCEDURE — 99999 PR PBB SHADOW E&M-EST. PATIENT-LVL IV: ICD-10-PCS | Mod: PBBFAC,,, | Performed by: ORTHOPAEDIC SURGERY

## 2023-04-21 PROCEDURE — 99999 PR PBB SHADOW E&M-EST. PATIENT-LVL IV: CPT | Mod: PBBFAC,,, | Performed by: ORTHOPAEDIC SURGERY

## 2023-04-21 PROCEDURE — 99024 PR POST-OP FOLLOW-UP VISIT: ICD-10-PCS | Mod: S$GLB,,, | Performed by: ORTHOPAEDIC SURGERY

## 2023-04-21 PROCEDURE — 3288F PR FALLS RISK ASSESSMENT DOCUMENTED: ICD-10-PCS | Mod: CPTII,S$GLB,, | Performed by: ORTHOPAEDIC SURGERY

## 2023-04-21 PROCEDURE — 1126F PR PAIN SEVERITY QUANTIFIED, NO PAIN PRESENT: ICD-10-PCS | Mod: CPTII,S$GLB,, | Performed by: ORTHOPAEDIC SURGERY

## 2023-04-21 PROCEDURE — 1159F PR MEDICATION LIST DOCUMENTED IN MEDICAL RECORD: ICD-10-PCS | Mod: CPTII,S$GLB,, | Performed by: ORTHOPAEDIC SURGERY

## 2023-04-21 PROCEDURE — 1159F MED LIST DOCD IN RCRD: CPT | Mod: CPTII,S$GLB,, | Performed by: ORTHOPAEDIC SURGERY

## 2023-04-21 PROCEDURE — 99024 POSTOP FOLLOW-UP VISIT: CPT | Mod: S$GLB,,, | Performed by: ORTHOPAEDIC SURGERY

## 2023-04-21 PROCEDURE — 1160F PR REVIEW ALL MEDS BY PRESCRIBER/CLIN PHARMACIST DOCUMENTED: ICD-10-PCS | Mod: CPTII,S$GLB,, | Performed by: ORTHOPAEDIC SURGERY

## 2023-04-21 PROCEDURE — 3288F FALL RISK ASSESSMENT DOCD: CPT | Mod: CPTII,S$GLB,, | Performed by: ORTHOPAEDIC SURGERY

## 2023-04-21 PROCEDURE — 3072F PR LOW RISK FOR RETINOPATHY: ICD-10-PCS | Mod: CPTII,S$GLB,, | Performed by: ORTHOPAEDIC SURGERY

## 2023-04-21 PROCEDURE — 73562 X-RAY EXAM OF KNEE 3: CPT | Mod: 26,RT,, | Performed by: RADIOLOGY

## 2023-04-21 PROCEDURE — 3072F LOW RISK FOR RETINOPATHY: CPT | Mod: CPTII,S$GLB,, | Performed by: ORTHOPAEDIC SURGERY

## 2023-04-21 PROCEDURE — 1160F RVW MEDS BY RX/DR IN RCRD: CPT | Mod: CPTII,S$GLB,, | Performed by: ORTHOPAEDIC SURGERY

## 2023-04-21 RX ORDER — CYCLOBENZAPRINE HCL 10 MG
5 TABLET ORAL 3 TIMES DAILY PRN
Qty: 90 TABLET | Refills: 1 | Status: SHIPPED | OUTPATIENT
Start: 2023-04-21 | End: 2023-05-01

## 2023-04-25 ENCOUNTER — CLINICAL SUPPORT (OUTPATIENT)
Dept: REHABILITATION | Facility: HOSPITAL | Age: 78
End: 2023-04-25
Attending: ORTHOPAEDIC SURGERY
Payer: MEDICARE

## 2023-04-25 DIAGNOSIS — R26.89 GAIT, ANTALGIC: ICD-10-CM

## 2023-04-25 DIAGNOSIS — M25.661 DECREASED RANGE OF MOTION OF RIGHT KNEE: Primary | ICD-10-CM

## 2023-04-25 PROCEDURE — 97110 THERAPEUTIC EXERCISES: CPT | Mod: PN,CQ

## 2023-04-25 NOTE — PROGRESS NOTES
Physical Therapy Daily Treatment Note     Name: Kaylee Romero  Clinic Number: [id number]    Therapy Diagnosis:        Encounter Diagnosis   Name Primary?    Primary osteoarthritis of right knee       Physician: Peterson Sharma MD    Visit Date: 2023     Physician Orders: PT Eval and Treat   Medical Diagnosis from Referral: M17.11 (ICD-10-CM) - Primary osteoarthritis of right knee  Evaluation Date: 3/2/2023  Plan of Care Expiration: 23 or  Visit  Authorization Period Expiration: 3/30/23  Visit # / Visits authorized:   Remaining Visits Scheduled - 16  PTA Visit #: 3/6    Eval Visit FOTO-  (Date/Score/Turn Green)   5th Visit FOTO   -  (Date/Score/Turn Green)   10th Visit FOTO  -  (Date/Score/Turn Green)   D/C FOTO          -  (Date/Score/Turn Green)      Time In: 3:25 PM  Time Out: 4:05 PM  Billable Time: 40 minutes  Non-Billable Time: 00 minutes    Precautions: Standard and Fall  Insurance: Payor: [employer] / Plan: [employer] 65 / Product Type: Medicare Advantage /     Subjective     Pt reports: Had all good reports at her ortho F/U and is pleased with her progress.     She is compliant with home exercise program.  Response to previous treatment: fine    Pre-Treatment Pain Ratin/10  Post-Treatment Pain Ratin/10  Location: right knee      Objective        Range of Motion/Strength:      Knee Left Right Pain/Dysfunction with Movement   AROM         Knee Flexion (140)  105 °   90 °  Pain    Knee Extension (0)  0 °   0 °  Pain        Kaylee received therapeutic exercises to develop strength, ROM, and flexibility for 40 minutes including:    ROM assessment  Warm-up    Supine    Quad Sets with towel right LE - 30 reps with 5 sec. Hold   Heel slides with strap - 5 minutes  SLR rightLE  - 3x5 reps (attempted but pt unable to perform)  Short Arc Quad 3x10 (blue bolster)  Long Arc Quad 3x10  Hip Adduction - 30 reps   Prone HS curl with right LE - 30 reps   Patella  Mob - 1' in each direction   Long sitting hip adduction 3x10    Seated    Marching in place 3x10 B sides   Hip abduction 3x10 B sides  Hip adduction w/ball 2-3'' hold 3x10  Heel slides seated - 5'   Ankle pumps x 30  +Hamstring stretch 3x30s  Gastroc stretch 3x30s    Standing        *Bold exercise performed     Kaylee participated in neuromuscular re-education activities to improve: Balance, Coordination, Kinesthetic, and Proprioception for 00 minutes. The following activities were included:  +Romberg balance    +TKE in standing and Upper Extremity support  + Weight shifts    Kaylee participated in dynamic functional therapeutic activities to improve functional performance for 00  minutes, including:    Visit Number:  4 out of 20   Activities performed   (duration/resistance)     Sit to stand 2x5 - np                         Kaylee received the following supervised modalities after being cleared for contradictions: IFC Electrical Stimulation:  Kaylee received IFC Electrical Stimulation for pain control applied to the R knee. Pt received stimulation at 10V at a carrier frequency of 4000Hz through 80-150Hz for 00 minutes. Kaylee tolderated treatment well without any adverse effects.  - NP today (pt stated she's felt her nerves being stimulated since last session and she did not like e.stim)      Home Exercises Provided and Patient Education Provided     Education provided:   - Continue with HEP    Written Home Exercises Provided: Patient instructed to cont prior HEP.  Exercises were reviewed and Kaylee was able to demonstrate them prior to the end of the session.  Kaylee demonstrated good  understanding of the education provided.     See EMR under Patient Instructions for exercises provided prior visit.    Assessment     Pt tolerated session well. Pt ambulated into clinic with improved balance, sometimes carrying cane. Tolerance for exercise is still low limiting exercises performed. Some discomfort with hamstring curls but  pt completed all reps. Nerve pain brought on by fatigue and is relieved with rest. Kaylee had no adverse effects with exercises and will continue to benefit from skilled therapy.       Kaylee Is progressing well towards Her goals.     Pt prognosis is Good.     Pt will continue to benefit from skilled outpatient physical therapy to address the deficits listed in the problem list box on initial evaluation, provide pt/family education and to maximize pt's level of independence in the home and community environment.     Pt's spiritual, cultural and educational needs considered and pt agreeable to plan of care and goals.    Anticipated barriers to physical therapy: PMH    Goals:    Short Term Goals: 6 weeks  Goal   Status    Pt. to report decreased right knee pain  </= 5/10 at worst to increase tolerance for prolong standing   Progressing 4/25/2023     Pt. to demonstrate proper gait mechanics requiring minimum verbal cues from PT Progressing 4/25/2023      Pt. to demonstrate an increase right knee AROM to 95 degrees to improve ease with stair negotiation. Progressing 4/25/2023      Pt to be Independent with HEP to improve ROM and independence with ADL's Progressing 4/25/2023            Long Term Goals: 12 weeks  Goal Status    Pt. to report decreased right knee pain  </= 2/10 at worst to increase tolerance for prolong standing      Pt. to demonstrate proper gait mechanics requiring no verbal cues from PT     Pt. to demonstrate an increase right knee AROM to >110 degrees to improve ease with stair negotiation.     Pt. to demonstrate increased MMT for right hip abductors to 4/5  to increase stability with ambulation on even surfaces.     Pt. to demonstrate increased MMT for right quadriceps to 4/5 to increase ability to negotiate stairs     Pt. to demonstrate increased MMT for right4 hamstring  to 4/5 to increase tolerance to squatting     Pt to be Independent with HEP to improve ROM and independence with ADL's        Plan      Continued with current POC      Kim Carver, PTA ,  4/25/2023

## 2023-04-26 ENCOUNTER — CLINICAL SUPPORT (OUTPATIENT)
Dept: REHABILITATION | Facility: HOSPITAL | Age: 78
End: 2023-04-26
Attending: ORTHOPAEDIC SURGERY
Payer: MEDICARE

## 2023-04-26 DIAGNOSIS — R26.89 GAIT, ANTALGIC: ICD-10-CM

## 2023-04-26 DIAGNOSIS — M25.661 DECREASED RANGE OF MOTION OF RIGHT KNEE: Primary | ICD-10-CM

## 2023-04-26 PROCEDURE — 97140 MANUAL THERAPY 1/> REGIONS: CPT | Mod: PN,CQ

## 2023-04-26 PROCEDURE — 97110 THERAPEUTIC EXERCISES: CPT | Mod: PN,CQ

## 2023-04-26 NOTE — PROGRESS NOTES
Physical Therapy Daily Treatment Note     Name: Kaylee Romero  Clinic Number: [id number]    Therapy Diagnosis:        Encounter Diagnosis   Name Primary?    Primary osteoarthritis of right knee       Physician: Peterson Sharma MD    Visit Date: 2023     Physician Orders: PT Eval and Treat   Medical Diagnosis from Referral: M17.11 (ICD-10-CM) - Primary osteoarthritis of right knee  Evaluation Date: 3/2/2023  Plan of Care Expiration: 23 or  Visit  Authorization Period Expiration: 3/30/23  Visit # / Visits authorized:   Remaining Visits Scheduled - 15  PTA Visit #:     Eval Visit FOTO-  (Date/Score/Turn Green)   5th Visit FOTO   -  (Date/Score/Turn Green)   10th Visit FOTO  -  (Date/Score/Turn Green)   D/C FOTO          -  (Date/Score/Turn Green)      Time In: 2:35 PM  Time Out: 3:15 PM  Billable Time: 40 minutes  Non-Billable Time: 00 minutes    Precautions: Standard and Fall  Insurance: Payor: [employer] / Plan: [employer] 65 / Product Type: Medicare Advantage /     Subjective     Pt reports: She walked to the mailbox today    She is compliant with home exercise program.  Response to previous treatment: fine    Pre-Treatment Pain Ratin/10  Post-Treatment Pain Ratin/10  Location: right knee      Objective        Range of Motion/Strength:      Knee Left Right Pain/Dysfunction with Movement   AROM         Knee Flexion (140)  105 °   93 °  Pain    Knee Extension (0)  0 °   0 °  Pain        Kaylee received therapeutic exercises to develop strength, ROM, and flexibility for 15 minutes including:    ROM assessment  Warm-up    Supine    Quad Sets with towel right LE - 30 reps with 5 sec. Hold   Heel slides with strap - 5 minutes  SLR rightLE  - 3x5 reps (attempted but pt unable to perform)  Short Arc Quad 3x10 (blue bolster)  Long Arc Quad 3x10  Hip Adduction - 30 reps   Prone HS curl with right LE - 30 reps   Patella Mob - 1' in each direction   Long  sitting hip adduction 3x10    Seated    Marching in place 3x10 B sides   Hip abduction 3x10 B sides  Hip adduction w/ball 2-3'' hold 3x10  Heel slides seated - 5'   Ankle pumps x 30  Hamstring stretch 3x30s  Gastroc stretch 3x30s    Standing        *Bold exercise performed     Kaylee received the following manual therapy techniques: Soft tissue Mobilization were applied to the: R knee for 25 minutes, including:    Visit Number:  6 out of 20   Manual Therapy  (amplitude and time)     Knee flexion PROM  Done   STM to anterior aspect of knee Done                     Kaylee participated in neuromuscular re-education activities to improve: Balance, Coordination, Kinesthetic, and Proprioception for 00 minutes. The following activities were included:  +Romberg balance    +TKE in standing and Upper Extremity support  + Weight shifts    Kaylee participated in dynamic functional therapeutic activities to improve functional performance for 00  minutes, including:    Visit Number:  4 out of 20   Activities performed   (duration/resistance)     Sit to stand 2x5 - np                         Kaylee received the following supervised modalities after being cleared for contradictions: IFC Electrical Stimulation:  Kaylee received IFC Electrical Stimulation for pain control applied to the R knee. Pt received stimulation at 10V at a carrier frequency of 4000Hz through 80-150Hz for 00 minutes. Kaylee tolderated treatment well without any adverse effects.  - NP today (pt stated she's felt her nerves being stimulated since last session and she did not like e.stim)      Home Exercises Provided and Patient Education Provided     Education provided:   - Continue with HEP    Written Home Exercises Provided: Patient instructed to cont prior HEP.  Exercises were reviewed and Kaylee was able to demonstrate them prior to the end of the session.  Kaylee demonstrated good  understanding of the education provided.     See EMR under Patient Instructions for  exercises provided prior visit.    Assessment     Pt tolerated session well. Improved AAROM displayed during heel slides. Pt achieved 93 degrees of knee flexion without pain. Session focused on manual therapy to improve ROM. Kaylee had no adverse effects and will continue to benefit from skilled therapy.       Kaylee Is progressing well towards Her goals.     Pt prognosis is Good.     Pt will continue to benefit from skilled outpatient physical therapy to address the deficits listed in the problem list box on initial evaluation, provide pt/family education and to maximize pt's level of independence in the home and community environment.     Pt's spiritual, cultural and educational needs considered and pt agreeable to plan of care and goals.    Anticipated barriers to physical therapy: PMH    Goals:    Short Term Goals: 6 weeks  Goal   Status    Pt. to report decreased right knee pain  </= 5/10 at worst to increase tolerance for prolong standing   Progressing 4/26/2023     Pt. to demonstrate proper gait mechanics requiring minimum verbal cues from PT Progressing 4/26/2023      Pt. to demonstrate an increase right knee AROM to 95 degrees to improve ease with stair negotiation. Progressing 4/26/2023      Pt to be Independent with HEP to improve ROM and independence with ADL's Progressing 4/26/2023            Long Term Goals: 12 weeks  Goal Status    Pt. to report decreased right knee pain  </= 2/10 at worst to increase tolerance for prolong standing      Pt. to demonstrate proper gait mechanics requiring no verbal cues from PT     Pt. to demonstrate an increase right knee AROM to >110 degrees to improve ease with stair negotiation.     Pt. to demonstrate increased MMT for right hip abductors to 4/5  to increase stability with ambulation on even surfaces.     Pt. to demonstrate increased MMT for right quadriceps to 4/5 to increase ability to negotiate stairs     Pt. to demonstrate increased MMT for right4 hamstring  to  4/5 to increase tolerance to squatting     Pt to be Independent with HEP to improve ROM and independence with ADL's        Plan     Continued with current POC      Kim Carver, JOSE ,  4/26/2023

## 2023-04-27 ENCOUNTER — CLINICAL SUPPORT (OUTPATIENT)
Dept: REHABILITATION | Facility: HOSPITAL | Age: 78
End: 2023-04-27
Attending: ORTHOPAEDIC SURGERY
Payer: MEDICARE

## 2023-04-27 DIAGNOSIS — M25.661 DECREASED RANGE OF MOTION OF RIGHT KNEE: Primary | ICD-10-CM

## 2023-04-27 DIAGNOSIS — R26.89 GAIT, ANTALGIC: ICD-10-CM

## 2023-04-27 PROBLEM — Z96.651 STATUS POST RIGHT KNEE REPLACEMENT: Status: ACTIVE | Noted: 2023-04-27

## 2023-04-27 PROCEDURE — 97110 THERAPEUTIC EXERCISES: CPT | Mod: PN

## 2023-04-27 NOTE — PROGRESS NOTES
Subjective:      Patient ID: Kaylee Romero is a 78 y.o. female.    Chief Complaint:   No chief complaint on file.    HPI  She comes in about 6 weeks out from right TKA.  She has no significant knee pain, and she is very pleased with her progress.  No new symptoms to report      Objective:        Ortho/SPM Exam    On exam, the scar is well healed without redness or tenderness.  There is no effusion.  Active range of motion is 0-115° without crepitus.  No instability to varus/valgus stress in extension or flexion.  No excessive sagittal plane instability.  Calves soft, nontender.  Distal neurovascular intact.        IMAGING:  Weightbearing x-rays show well-fixed and positioned total knee arthroplasty without signs of loosening or other complications  Assessment:   Doing well status post right TKA      Plan:   Return in 6 weeks for 12 week visit    The patient's pathophysiology was explained in detail with reference to x-rays, models, other visual aids as appropriate.  Treatment options were discussed in detail.  Questions were invited and answered to the patient's satisfaction.      Peterson Sharma MD  Orthopedic Surgery

## 2023-04-27 NOTE — PROGRESS NOTES
"  Physical Therapy Daily Treatment Note     Name: Kaylee Romero  Clinic Number: [id number]    Therapy Diagnosis:        Encounter Diagnosis   Name Primary?    Primary osteoarthritis of right knee       Physician: Peterson Sharma MD    Visit Date: 2023     Physician Orders: PT Eval and Treat   Medical Diagnosis from Referral: M17.11 (ICD-10-CM) - Primary osteoarthritis of right knee  Evaluation Date: 3/2/2023  Plan of Care Expiration: 23 or 24 Visit  Authorization Period Expiration: 3/30/23  Visit # / Visits authorized:   Remaining Visits Scheduled - 15  PTA Visit #:     Eval Visit FOTO-  (Date/Score/Turn Green)   5th Visit FOTO   -  (Date/Score/Turn Green)   10th Visit FOTO  -  (Date/Score/Turn Green)   D/C FOTO          -  (Date/Score/Turn Green)      Time In: 11:50 AM  Time Out: 12:30 PM  Billable Time: 40 minutes (3TE)  Non-Billable Time: 00 minutes    Precautions: Standard and Fall  Insurance: Payor: [employer] / Plan: [employer] 65 / Product Type: Medicare Advantage /     Subjective     Pt reports: continued pain in R knee but mentioned having had therapy the past 2 days and needed for this treatment to be "easier".    She is compliant with home exercise program.  Response to previous treatment: fine    Pre-Treatment Pain Ratin/10  Post-Treatment Pain Ratin/10  Location: right knee      Objective        Range of Motion/Strength:      Knee Left Right Pain/Dysfunction with Movement   AROM         Knee Flexion (140)  105 °   93 °  Pain    Knee Extension (0)  0 °   0 °  Pain        Kaylee received therapeutic exercises to develop strength, ROM, and flexibility for 40 minutes including:    ROM assessment  Warm-up    Supine    Quad Sets with towel right LE - 30 reps with 5 sec. Hold   Heel slides with strap - 5 minutes  SLR rightLE  - 3x5 reps (attempted but pt unable to perform)  Short Arc Quad 3x10 (blue bolster)  Long Arc Quad 3x10  Hip Adduction - 30 " reps   Prone HS curl with right LE - 30 reps   Patella Mob - 1' in each direction   Long sitting hip adduction 3x10    Seated    Marching in place 3x10 B sides  Hip abduction 3x10 B sides RTB  Hip adduction w/ball 2-3'' hold 3x10  Heel slides seated - 5'  Heel to toe 30x  Ankle pumps x 30  Hamstring stretch 3x30s  Gastroc stretch 3x30s    Standing        *Bold exercise performed     Kaylee received the following manual therapy techniques: Soft tissue Mobilization were applied to the: R knee for 00 minutes, including:    Visit Number:  6 out of 20   Manual Therapy  (amplitude and time)     Knee flexion PROM  NP   STM to anterior aspect of knee NP                     Kaylee participated in neuromuscular re-education activities to improve: Balance, Coordination, Kinesthetic, and Proprioception for 00 minutes. The following activities were included:  +Romberg balance    +TKE in standing and Upper Extremity support  + Weight shifts    Kaylee participated in dynamic functional therapeutic activities to improve functional performance for 00  minutes, including:    Visit Number:  4 out of 20   Activities performed   (duration/resistance)     Sit to stand 2x5 - np                         Kaylee received the following supervised modalities after being cleared for contradictions: IFC Electrical Stimulation:  Kaylee received IFC Electrical Stimulation for pain control applied to the R knee. Pt received stimulation at 10V at a carrier frequency of 4000Hz through 80-150Hz for 00 minutes. Kaylee tolderated treatment well without any adverse effects.  - NP today (pt stated she's felt her nerves being stimulated since last session and she did not like e.stim)      Home Exercises Provided and Patient Education Provided     Education provided:   - Continue with HEP    Written Home Exercises Provided: Patient instructed to cont prior HEP.  Exercises were reviewed and Kaylee was able to demonstrate them prior to the end of the session.  Kaylee  "demonstrated good  understanding of the education provided.     See EMR under Patient Instructions for exercises provided prior visit.    Assessment     Pt tolerated session well. No reported pain throughout treatment session and patient mentioned feeling much more "loose" after treatment session. Education was provided on postural cues and functional mechanics with daily activities. Patient to continue improving functional strength, functional mobility, and pain relief at home and at next treatment. Continue with current POC.      Kaylee Is progressing well towards Her goals.     Pt prognosis is Good.     Pt will continue to benefit from skilled outpatient physical therapy to address the deficits listed in the problem list box on initial evaluation, provide pt/family education and to maximize pt's level of independence in the home and community environment.     Pt's spiritual, cultural and educational needs considered and pt agreeable to plan of care and goals.    Anticipated barriers to physical therapy: PMH    Goals:    Short Term Goals: 6 weeks  Goal   Status    Pt. to report decreased right knee pain  </= 5/10 at worst to increase tolerance for prolong standing   Progressing 4/27/2023     Pt. to demonstrate proper gait mechanics requiring minimum verbal cues from PT Progressing 4/27/2023      Pt. to demonstrate an increase right knee AROM to 95 degrees to improve ease with stair negotiation. Progressing 4/27/2023      Pt to be Independent with HEP to improve ROM and independence with ADL's Progressing 4/27/2023            Long Term Goals: 12 weeks  Goal Status    Pt. to report decreased right knee pain  </= 2/10 at worst to increase tolerance for prolong standing      Pt. to demonstrate proper gait mechanics requiring no verbal cues from PT     Pt. to demonstrate an increase right knee AROM to >110 degrees to improve ease with stair negotiation.     Pt. to demonstrate increased MMT for right hip abductors to 4/5 "  to increase stability with ambulation on even surfaces.     Pt. to demonstrate increased MMT for right quadriceps to 4/5 to increase ability to negotiate stairs     Pt. to demonstrate increased MMT for right4 hamstring  to 4/5 to increase tolerance to squatting     Pt to be Independent with HEP to improve ROM and independence with ADL's        Plan     Continued with current POC      Bryan Brown, PT, DPT  4/27/2023

## 2023-04-29 NOTE — ASSESSMENT & PLAN NOTE
CPAP compliance: Yes. Encouraged weight loss, increased exercise.  Recommend caution with sedating medication that can cause respiratory depression.          non-distended

## 2023-05-02 ENCOUNTER — CLINICAL SUPPORT (OUTPATIENT)
Dept: REHABILITATION | Facility: HOSPITAL | Age: 78
End: 2023-05-02
Attending: ORTHOPAEDIC SURGERY
Payer: MEDICARE

## 2023-05-02 DIAGNOSIS — R26.89 GAIT, ANTALGIC: ICD-10-CM

## 2023-05-02 DIAGNOSIS — M25.661 DECREASED RANGE OF MOTION OF RIGHT KNEE: Primary | ICD-10-CM

## 2023-05-02 PROCEDURE — 97110 THERAPEUTIC EXERCISES: CPT | Mod: PN,CQ

## 2023-05-02 NOTE — PROGRESS NOTES
Physical Therapy Daily Treatment Note     Name: Kaylee Romero  Clinic Number: [id number]    Therapy Diagnosis:        Encounter Diagnosis   Name Primary?    Primary osteoarthritis of right knee       Physician: Peterson Sharma MD    Visit Date: 5/2/2023     Physician Orders: PT Eval and Treat   Medical Diagnosis from Referral: M17.11 (ICD-10-CM) - Primary osteoarthritis of right knee  Evaluation Date: 3/2/2023  Plan of Care Expiration: 5/25/23 or 24th Visit  Authorization Period Expiration: 3/30/23  Visit # / Visits authorized: 8/ 20  Remaining Visits Scheduled - 13  PTA Visit #: 1/6    Eval Visit FOTO-  04/26/2023 - 42.80  5th Visit FOTO   -  (Date/Score/Turn Green)   10th Visit FOTO  -  (Date/Score/Turn Green)   D/C FOTO          -  (Date/Score/Turn Green)      Time In: 3:20 PM  Time Out: 4:00 PM  Billable Time: 40 minutes   Non-Billable Time: 00 minutes    Precautions: Standard and Fall  Insurance: Payor: [employer] / Plan: [employer] 65 / Product Type: Medicare Advantage /     Subjective     Pt reports: Some pain today    She is compliant with home exercise program.  Response to previous treatment: fine    Pre-Treatment Pain Rating: 3/10  Post-Treatment Pain Rating: 3/10  Location: right knee      Objective        Range of Motion/Strength:      Knee Left Right Pain/Dysfunction with Movement   AROM         Knee Flexion (140)  105 °   93 °  Pain    Knee Extension (0)  0 °   0 °  Pain        Kaylee received therapeutic exercises to develop strength, ROM, and flexibility for 38 minutes including:    ROM assessment  Warm-up    Supine    Quad Sets with towel right LE - 30 reps with 5 sec. Hold   Heel slides with strap - 5 minutes  SLR rightLE  - 3x5 reps (attempted but pt unable to perform)  Short Arc Quad 3x10 (blue bolster)  Long Arc Quad 3x10  Hip Adduction - 30 reps   Prone HS curl with right LE - 30 reps   Patella Mob - 1' in each direction   Long sitting hip adduction  3x10    Seated    Marching in place 3x10 B sides GTB  Hip abduction 3x10 B sides GTB  Hip adduction w/ball 2-3'' hold 3x10  Heel slides seated - 5'  Heel to toe 30x  Ankle pumps x 30  Hamstring stretch 3x30s  Gastroc stretch 3x30s  Hamstring curls 3x10    Standing        *Bold exercise performed     Kaylee received the following manual therapy techniques: Soft tissue Mobilization were applied to the: R knee for 00 minutes, including:    Visit Number:  6 out of 20   Manual Therapy  (amplitude and time)     Knee flexion PROM  NP   STM to anterior aspect of knee NP                     Kaylee participated in neuromuscular re-education activities to improve: Balance, Coordination, Kinesthetic, and Proprioception for 00 minutes. The following activities were included:  +Romberg balance    +TKE in standing and Upper Extremity support  + Weight shifts    Kaylee participated in dynamic functional therapeutic activities to improve functional performance for 2  minutes, including:    Visit Number:  4 out of 20   Activities performed   (duration/resistance)     Sit to stand 2x5 - Done                         Kaylee received the following supervised modalities after being cleared for contradictions: IFC Electrical Stimulation:  Kaylee received IFC Electrical Stimulation for pain control applied to the R knee. Pt received stimulation at 10V at a carrier frequency of 4000Hz through 80-150Hz for 00 minutes. Kaylee tolderated treatment well without any adverse effects.  - NP today (pt stated she's felt her nerves being stimulated since last session and she did not like e.stim)      Home Exercises Provided and Patient Education Provided     Education provided:   - Continue with HEP    Written Home Exercises Provided: Patient instructed to cont prior HEP.  Exercises were reviewed and Kaylee was able to demonstrate them prior to the end of the session.  Kaylee demonstrated good  understanding of the education provided.     See EMR under  Patient Instructions for exercises provided prior visit.    Assessment     Pt tolerated session well. She demonstrated increased tolerance for exercise today. Long Arc Quad with ball added to strengthen VMO. Sit to stands tolerated well without LOB, Upper Extremity support used to push on legs today. Overall decrease in rest break time with all exercises. Kaylee had no adverse effects today and will continue to benefit from skilled therapy.       Kaylee Is progressing well towards Her goals.     Pt prognosis is Good.     Pt will continue to benefit from skilled outpatient physical therapy to address the deficits listed in the problem list box on initial evaluation, provide pt/family education and to maximize pt's level of independence in the home and community environment.     Pt's spiritual, cultural and educational needs considered and pt agreeable to plan of care and goals.    Anticipated barriers to physical therapy: PMH    Goals:    Short Term Goals: 6 weeks  Goal   Status    Pt. to report decreased right knee pain  </= 5/10 at worst to increase tolerance for prolong standing   Progressing 5/2/2023     Pt. to demonstrate proper gait mechanics requiring minimum verbal cues from PT Progressing 5/2/2023      Pt. to demonstrate an increase right knee AROM to 95 degrees to improve ease with stair negotiation. Progressing 5/2/2023      Pt to be Independent with HEP to improve ROM and independence with ADL's Progressing 5/2/2023            Long Term Goals: 12 weeks  Goal Status    Pt. to report decreased right knee pain  </= 2/10 at worst to increase tolerance for prolong standing      Pt. to demonstrate proper gait mechanics requiring no verbal cues from PT     Pt. to demonstrate an increase right knee AROM to >110 degrees to improve ease with stair negotiation.     Pt. to demonstrate increased MMT for right hip abductors to 4/5  to increase stability with ambulation on even surfaces.     Pt. to demonstrate increased  MMT for right quadriceps to 4/5 to increase ability to negotiate stairs     Pt. to demonstrate increased MMT for right4 hamstring  to 4/5 to increase tolerance to squatting     Pt to be Independent with HEP to improve ROM and independence with ADL's        Plan     Continued with current POC      Kim Carver, JOSE,   5/2/2023

## 2023-05-04 ENCOUNTER — CLINICAL SUPPORT (OUTPATIENT)
Dept: REHABILITATION | Facility: HOSPITAL | Age: 78
End: 2023-05-04
Attending: ORTHOPAEDIC SURGERY
Payer: MEDICARE

## 2023-05-04 DIAGNOSIS — R26.89 GAIT, ANTALGIC: ICD-10-CM

## 2023-05-04 DIAGNOSIS — M25.661 DECREASED RANGE OF MOTION OF RIGHT KNEE: Primary | ICD-10-CM

## 2023-05-04 PROCEDURE — 97530 THERAPEUTIC ACTIVITIES: CPT | Mod: PN

## 2023-05-04 PROCEDURE — 97140 MANUAL THERAPY 1/> REGIONS: CPT | Mod: PN

## 2023-05-04 PROCEDURE — 97110 THERAPEUTIC EXERCISES: CPT | Mod: PN

## 2023-05-04 NOTE — PROGRESS NOTES
Physical Therapy Daily Treatment Note     Name: Kaylee Romero  Clinic Number: [id number]    Therapy Diagnosis:        Encounter Diagnosis   Name Primary?    Primary osteoarthritis of right knee       Physician: Peterson Sharma MD    Visit Date: 2023     Physician Orders: PT Eval and Treat   Medical Diagnosis from Referral: M17.11 (ICD-10-CM) - Primary osteoarthritis of right knee  Evaluation Date: 3/2/2023  Plan of Care Expiration: 23 or 24th Visit  Authorization Period Expiration: 3/30/23  Visit # / Visits authorized:   Remaining Visits Scheduled - 13  PTA Visit #: 0/6    Eval Visit FOTO-  2023 - 42.80  5th Visit FOTO   -  2023  10th Visit FOTO  -  (Date/Score/Turn Green)   D/C FOTO          -  (Date/Score/Turn Green)      Time In: 1305  Time Out: 1350  Billable Time: 45 minutes   Non-Billable Time: 00 minutes    Precautions: Standard and Fall  Insurance: Payor: [employer] / Plan: [employer] 65 / Product Type: Medicare Advantage /     Subjective     Pt reports: no pain just stiffness    She is compliant with home exercise program.  Response to previous treatment: fine    Pre-Treatment Pain Ratin/10  Post-Treatment Pain Ratin/10  Location: right knee      Objective        Range of Motion/Strength:      Knee Left Right Pain/Dysfunction with Movement   AROM         Knee Flexion (140)  105 °   93 °  Pain    Knee Extension (0)  0 °   0 °  Pain        Kaylee received therapeutic exercises to develop strength, ROM, and flexibility for 25 minutes including:    ROM assessment  Warm-up    Supine    Quad Sets with towel right LE - 30 reps with 5 sec. Hold   Heel slides with strap - 5 minutes  SLR rightLE  - 3x5 reps (attempted but pt unable to perform)  Short Arc Quad 3x10 (blue bolster)  Long Arc Quad 3x10  Hip Adduction - 30 reps   Prone HS curl with right LE - 30 reps   Patella Mob - 1' in each direction   Long sitting hip adduction 3x10    Seated     Marching in place 3x10 B sides GTB  Hip abduction 3x10 B sides GTB  Hip adduction w/ball 2-3'' hold 3x10  Heel slides seated - 5' with and 3' without strap  Heel to toe 30x  Ankle pumps x 30  Hamstring stretch 3x30s  Gastroc stretch 3x30s  Hamstring curls 3x10    Standing        *Bold exercise performed     Kaylee received the following manual therapy techniques: Soft tissue Mobilization were applied to the: R knee for 10 minutes, including:    Manual Therapy  (amplitude and time)     Knee flexion PROM  NP   STM to anterior aspect of knee NP   Patella Mobilization  Done                  Kaylee participated in neuromuscular re-education activities to improve: Balance, Coordination, Kinesthetic, and Proprioception for 00 minutes. The following activities were included:  +Romberg balance    +TKE in standing and Upper Extremity support  + Weight shifts    Kaylee participated in dynamic functional therapeutic activities to improve functional performance for 10  minutes, including:    Activities performed   (duration/resistance)     Sit to stand 2x5 - Done   Reaching on ball of feet  Done                      Kaylee received the following supervised modalities after being cleared for contradictions: IFC Electrical Stimulation:  Kaylee received IFC Electrical Stimulation for pain control applied to the R knee. Pt received stimulation at 10V at a carrier frequency of 4000Hz through 80-150Hz for 00 minutes. Kaylee tolderated treatment well without any adverse effects.  - NP today (pt stated she's felt her nerves being stimulated since last session and she did not like e.stim)      Home Exercises Provided and Patient Education Provided     Education provided:   - Continue with HEP    Written Home Exercises Provided: Patient instructed to cont prior HEP.  Exercises were reviewed and Kaylee was able to demonstrate them prior to the end of the session.  Kaylee demonstrated good  understanding of the education provided.     See EMR  under Patient Instructions for exercises provided prior visit.    Assessment     Patient presents with denied reports of pain with functional activity. Patient reports the ability to ambulate without an assistive device during household ambulation the patient displays fair balance in standing with balance activity Overall decrease in rest break time with all exercises(continued). Kaylee had no adverse effects today and will continue to benefit from skilled therapy.     Kaylee Is progressing well towards Her goals.     Pt prognosis is Good.     Pt will continue to benefit from skilled outpatient physical therapy to address the deficits listed in the problem list box on initial evaluation, provide pt/family education and to maximize pt's level of independence in the home and community environment.     Pt's spiritual, cultural and educational needs considered and pt agreeable to plan of care and goals.    Anticipated barriers to physical therapy: PMH    Goals:    Short Term Goals: 6 weeks  Goal   Status    Pt. to report decreased right knee pain  </= 5/10 at worst to increase tolerance for prolong standing   Progressing 5/4/2023     Pt. to demonstrate proper gait mechanics requiring minimum verbal cues from PT Progressing 5/4/2023      Pt. to demonstrate an increase right knee AROM to 95 degrees to improve ease with stair negotiation. Progressing 5/4/2023      Pt to be Independent with HEP to improve ROM and independence with ADL's Progressing 5/4/2023            Long Term Goals: 12 weeks  Goal Status    Pt. to report decreased right knee pain  </= 2/10 at worst to increase tolerance for prolong standing      Pt. to demonstrate proper gait mechanics requiring no verbal cues from PT     Pt. to demonstrate an increase right knee AROM to >110 degrees to improve ease with stair negotiation.     Pt. to demonstrate increased MMT for right hip abductors to 4/5  to increase stability with ambulation on even surfaces.     Pt. to  demonstrate increased MMT for right quadriceps to 4/5 to increase ability to negotiate stairs     Pt. to demonstrate increased MMT for right4 hamstring  to 4/5 to increase tolerance to squatting     Pt to be Independent with HEP to improve ROM and independence with ADL's        Plan     Continued with current POC      Maxwell Brewer, PT,   5/4/2023

## 2023-05-05 ENCOUNTER — CLINICAL SUPPORT (OUTPATIENT)
Dept: REHABILITATION | Facility: HOSPITAL | Age: 78
End: 2023-05-05
Attending: ORTHOPAEDIC SURGERY
Payer: MEDICARE

## 2023-05-05 DIAGNOSIS — M25.661 DECREASED RANGE OF MOTION OF RIGHT KNEE: Primary | ICD-10-CM

## 2023-05-05 DIAGNOSIS — R26.89 GAIT, ANTALGIC: ICD-10-CM

## 2023-05-05 PROCEDURE — 97140 MANUAL THERAPY 1/> REGIONS: CPT | Mod: PN,CQ

## 2023-05-05 PROCEDURE — 97530 THERAPEUTIC ACTIVITIES: CPT | Mod: PN,CQ

## 2023-05-05 PROCEDURE — 97110 THERAPEUTIC EXERCISES: CPT | Mod: PN,CQ

## 2023-05-05 NOTE — PROGRESS NOTES
Physical Therapy Daily Treatment Note     Name: Kaylee Romero  Clinic Number: [id number]    Therapy Diagnosis:        Encounter Diagnosis   Name Primary?    Primary osteoarthritis of right knee       Physician: Peterson Sharma MD    Visit Date: 2023     Physician Orders: PT Eval and Treat   Medical Diagnosis from Referral: M17.11 (ICD-10-CM) - Primary osteoarthritis of right knee  Evaluation Date: 3/2/2023  Plan of Care Expiration: 23 or 24th Visit  Authorization Period Expiration: 3/30/23  Visit # / Visits authorized: 10/ 20  Remaining Visits Scheduled - 11  PTA Visit #:     Eval Visit FOTO-  2023 - 42.80  5th Visit FOTO   -  2023  10th Visit FOTO  -  (Date/Score/Turn Green)   D/C FOTO          -  (Date/Score/Turn Green)      Time In: 1:50  Time Out: 2:30  Billable Time: 40 minutes   Non-Billable Time: 00 minutes    Precautions: Standard and Fall  Insurance: Payor: [employer]ARE / Plan: [employer] 65 / Product Type: Medicare Advantage /     Subjective     Pt reports: Pain at anterior aspect of knee    She is compliant with home exercise program.  Response to previous treatment: fine    Pre-Treatment Pain Ratin/10  Post-Treatment Pain Ratin/10  Location: right knee      Objective        Range of Motion/Strength:      Knee Left Right Pain/Dysfunction with Movement   AROM         Knee Flexion (140)  105 °   93 °  Pain    Knee Extension (0)  0 °   0 °  Pain        Kaylee received therapeutic exercises to develop strength, ROM, and flexibility for 15 minutes including:    ROM assessment  Warm-up    Supine    Quad Sets with towel right LE - 30 reps with 5 sec. Hold   Heel slides with strap - 5 minutes  SLR rightLE  - 3x5 reps (attempted but pt unable to perform)  Short Arc Quad 3x10 (blue bolster)  Long Arc Quad #1 3x10  Hip Adduction - 30 reps   Prone HS curl with right LE - 30 reps   Patella Mob - 1' in each direction   Long sitting hip adduction  3x10    Seated    Marching in place 3x10 B sides GTB  Hip abduction 3x10 B sides GTB  Hip adduction w/ball 2-3'' hold 3x10  Heel slides seated - 5' with and 3' without strap  Heel to toe 30x  Ankle pumps x 30  Hamstring stretch 3x30s  Gastroc stretch 3x30s  Hamstring curls RTB 3x10    Standing        *Bold exercise performed     Kaylee received the following manual therapy techniques: Soft tissue Mobilization were applied to the: R knee for 10 minutes, including:    Manual Therapy  (amplitude and time)     Knee flexion PROM  NP   STM to anterior aspect of knee NP   Patella Mobilization  Done    CRFM to lateral aspect of knee Done             Kaylee participated in neuromuscular re-education activities to improve: Balance, Coordination, Kinesthetic, and Proprioception for 5 minutes. The following activities were included:  Romberg balance eyes closed 3x30s  B Semi-tandem stance balance 3x30s  +TKE in standing and Upper Extremity support  + Weight shifts    Kaylee participated in dynamic functional therapeutic activities to improve functional performance for 10  minutes, including:    Activities performed   (duration/resistance)     Sit to stand 3x5 - Done                         Kaylee received the following supervised modalities after being cleared for contradictions: IFC Electrical Stimulation:  Kaylee received IFC Electrical Stimulation for pain control applied to the R knee. Pt received stimulation at 10V at a carrier frequency of 4000Hz through 80-150Hz for 00 minutes. Kaylee tolderated treatment well without any adverse effects.  - NP today (pt stated she's felt her nerves being stimulated since last session and she did not like e.stim)      Home Exercises Provided and Patient Education Provided     Education provided:   - Continue with HEP    Written Home Exercises Provided: Patient instructed to cont prior HEP.  Exercises were reviewed and Kaylee was able to demonstrate them prior to the end of the session.  Kaylee  demonstrated good  understanding of the education provided.     See EMR under Patient Instructions for exercises provided prior visit.    Assessment     Pt tolerated session well. CRFM performed at beginning of session to reduce pain at knee and increase tolerance for exercise. Weight added to Long Arc Quad to improve quad strength, pt tolerated well. Reps increased for sit <> stands, pt completed all, verbal cues given to reduce kick out when sitting, this eliminated lateral pain in knee. Romberg balance with eyes closed tolerated well. Pt had no LOB. Kaylee will continue to benefit from skilled therapy.     Patient presents with denied reports of pain with functional activity. Patient reports the ability to ambulate without an assistive device during household ambulation the patient displays fair balance in standing with balance activity       Kaylee Is progressing well towards Her goals.     Pt prognosis is Good.     Pt will continue to benefit from skilled outpatient physical therapy to address the deficits listed in the problem list box on initial evaluation, provide pt/family education and to maximize pt's level of independence in the home and community environment.     Pt's spiritual, cultural and educational needs considered and pt agreeable to plan of care and goals.    Anticipated barriers to physical therapy: PMH    Goals:    Short Term Goals: 6 weeks  Goal   Status    Pt. to report decreased right knee pain  </= 5/10 at worst to increase tolerance for prolong standing   Progressing 5/5/2023     Pt. to demonstrate proper gait mechanics requiring minimum verbal cues from PT Progressing 5/5/2023      Pt. to demonstrate an increase right knee AROM to 95 degrees to improve ease with stair negotiation. Progressing 5/5/2023      Pt to be Independent with HEP to improve ROM and independence with ADL's Progressing 5/5/2023            Long Term Goals: 12 weeks  Goal Status    Pt. to report decreased right knee pain   </= 2/10 at worst to increase tolerance for prolong standing      Pt. to demonstrate proper gait mechanics requiring no verbal cues from PT     Pt. to demonstrate an increase right knee AROM to >110 degrees to improve ease with stair negotiation.     Pt. to demonstrate increased MMT for right hip abductors to 4/5  to increase stability with ambulation on even surfaces.     Pt. to demonstrate increased MMT for right quadriceps to 4/5 to increase ability to negotiate stairs     Pt. to demonstrate increased MMT for right4 hamstring  to 4/5 to increase tolerance to squatting     Pt to be Independent with HEP to improve ROM and independence with ADL's        Plan     Continued with current POC      Kim Carver, JOSE,   5/5/2023

## 2023-05-07 ENCOUNTER — DOCUMENT SCAN (OUTPATIENT)
Dept: HOME HEALTH SERVICES | Facility: HOSPITAL | Age: 78
End: 2023-05-07
Payer: MEDICARE

## 2023-05-07 NOTE — PLAN OF CARE
09/09/19 1135   Post-Acute Status   Post-Acute Authorization Placement   Post-Acute Placement Status Insurance Denial     Pt was denied by [facility] for rehab.  They will approve SNF.  NETTIE spoke with Sarah at [facility].  They will have a bed for her today.  Sarah will contact the SW once they get approval.  NETTIE will f/u this afternoon. Pt was updated by the CM.    Mar Matute, KRISTA [id number]   Satisfactory

## 2023-05-08 ENCOUNTER — CLINICAL SUPPORT (OUTPATIENT)
Dept: REHABILITATION | Facility: HOSPITAL | Age: 78
End: 2023-05-08
Attending: ORTHOPAEDIC SURGERY
Payer: MEDICARE

## 2023-05-08 DIAGNOSIS — R26.89 GAIT, ANTALGIC: ICD-10-CM

## 2023-05-08 DIAGNOSIS — M25.661 DECREASED RANGE OF MOTION OF RIGHT KNEE: Primary | ICD-10-CM

## 2023-05-08 PROCEDURE — 97530 THERAPEUTIC ACTIVITIES: CPT | Mod: PN,CQ

## 2023-05-08 PROCEDURE — 97110 THERAPEUTIC EXERCISES: CPT | Mod: PN,CQ

## 2023-05-08 PROCEDURE — 97140 MANUAL THERAPY 1/> REGIONS: CPT | Mod: PN,CQ

## 2023-05-08 NOTE — PROGRESS NOTES
Physical Therapy Daily Treatment Note     Name: Kaylee Romero  Clinic Number: [id number]    Therapy Diagnosis:        Encounter Diagnosis   Name Primary?    Primary osteoarthritis of right knee       Physician: Peterson Sharma MD    Visit Date: 2023     Physician Orders: PT Eval and Treat   Medical Diagnosis from Referral: M17.11 (ICD-10-CM) - Primary osteoarthritis of right knee  Evaluation Date: 3/2/2023  Plan of Care Expiration: 23 or 24th Visit  Authorization Period Expiration: 3/30/23  Visit # / Visits authorized:   Remaining Visits Scheduled -   PTA Visit #: 2/6    Eval Visit FOTO-  2023 - 42.80  5th Visit FOTO   -  2023  10th Visit FOTO  -  (Date/Score/Turn Green)   D/C FOTO          -  (Date/Score/Turn Green)      Time In: 2:30  Time Out: 3:15  Billable Time: 45 minutes   Non-Billable Time: 00 minutes    Precautions: Standard and Fall  Insurance: Payor: [employer] / Plan: [employer] / Product Type: Medicare Advantage /     Subjective     Pt reports: pt tried a desk bike on Saturday for 11 min total. Is still feeling sore from exercise.    She is compliant with home exercise program.  Response to previous treatment: fine    Pre-Treatment Pain Ratin/10  Post-Treatment Pain Ratin/10  Location: right knee      Objective        Range of Motion/Strength:      Knee Left Right Pain/Dysfunction with Movement   AROM         Knee Flexion (140)  105 °   93 °  Pain    Knee Extension (0)  0 °   0 °  Pain        Kaylee received therapeutic exercises to develop strength, ROM, and flexibility for 15 minutes including:    ROM assessment  Warm-up    Supine    Quad Sets with towel right LE - 30 reps with 5 sec. Hold   Heel slides with strap - 5 minutes  SLR rightLE  - 3x5 reps (attempted but pt unable to perform)  Short Arc Quad 3x10 (blue bolster)  Long Arc Quad #1 3x10  Hip Adduction - 30 reps   Prone HS curl with right LE - 30 reps   Patella Mob - 1'  in each direction   Long sitting hip adduction 3x10    Seated    Marching in place 3x10 B sides GTB  Hip abduction 3x10 B sides GTB  Hip adduction w/ball 2-3'' hold 3x10  Heel slides seated - 5' with and 3' without strap  Heel to toe 30x   Ankle pumps x 30  Hamstring stretch 3x30s  Gastroc stretch 3x30s  Hamstring curls RTB 3x10    Standing        *Bold exercise performed     Kaylee received the following manual therapy techniques: Soft tissue Mobilization were applied to the: R knee for 10 minutes, including:    Manual Therapy  (amplitude and time)     Knee flexion PROM  NP   STM to anterior aspect of knee NP   Patella Mobilization  Done   CRFM to lateral aspect of knee NP   Scar tissue massage Done         Kaylee participated in neuromuscular re-education activities to improve: Balance, Coordination, Kinesthetic, and Proprioception for 5 minutes. The following activities were included:  Romberg balance eyes closed 3x30s  B Semi-tandem stance balance 3x30s  +TKE in standing and Upper Extremity support  + Weight shifts    Kaylee participated in dynamic functional therapeutic activities to improve functional performance for 10  minutes, including:    Activities performed   (duration/resistance)     Sit to stand 3x5 - Done                         Kaylee received the following supervised modalities after being cleared for contradictions: IFC Electrical Stimulation:  Kaylee received IFC Electrical Stimulation for pain control applied to the R knee. Pt received stimulation at 10V at a carrier frequency of 4000Hz through 80-150Hz for 00 minutes. Kaylee tolderated treatment well without any adverse effects.  - NP today (pt stated she's felt her nerves being stimulated since last session and she did not like e.stim)      Home Exercises Provided and Patient Education Provided     Education provided:   - Continue with HEP    Written Home Exercises Provided: Patient instructed to cont prior HEP.  Exercises were reviewed and Kaylee  was able to demonstrate them prior to the end of the session.  Kaylee demonstrated good  understanding of the education provided.     See EMR under Patient Instructions for exercises provided prior visit.    Assessment     Pt tolerated session well. She presented with increased fatigue, 3 rest breaks taken with timed heel slides.  STM attempted to reduce knee pain, Scar tissue mobilization  performed to break up scar tissue and improve ROM. Pt stated knee pain increased to 7/10 after scar tissue mobilization. PTA advised pt to ice and elevated when she returns home to decrease swelling and discomfort. Pt had no difficulty ambulating out of clinic and will continue to benefit from skilled therapy.       Kaylee Is progressing well towards Her goals.     Pt prognosis is Good.     Pt will continue to benefit from skilled outpatient physical therapy to address the deficits listed in the problem list box on initial evaluation, provide pt/family education and to maximize pt's level of independence in the home and community environment.     Pt's spiritual, cultural and educational needs considered and pt agreeable to plan of care and goals.    Anticipated barriers to physical therapy: PMH    Goals:    Short Term Goals: 6 weeks  Goal   Status    Pt. to report decreased right knee pain  </= 5/10 at worst to increase tolerance for prolong standing   Progressing 5/8/2023     Pt. to demonstrate proper gait mechanics requiring minimum verbal cues from PT Progressing 5/8/2023      Pt. to demonstrate an increase right knee AROM to 95 degrees to improve ease with stair negotiation. Progressing 5/8/2023      Pt to be Independent with HEP to improve ROM and independence with ADL's Progressing 5/8/2023            Long Term Goals: 12 weeks  Goal Status    Pt. to report decreased right knee pain  </= 2/10 at worst to increase tolerance for prolong standing      Pt. to demonstrate proper gait mechanics requiring no verbal cues from PT      Pt. to demonstrate an increase right knee AROM to >110 degrees to improve ease with stair negotiation.     Pt. to demonstrate increased MMT for right hip abductors to 4/5  to increase stability with ambulation on even surfaces.     Pt. to demonstrate increased MMT for right quadriceps to 4/5 to increase ability to negotiate stairs     Pt. to demonstrate increased MMT for right4 hamstring  to 4/5 to increase tolerance to squatting     Pt to be Independent with HEP to improve ROM and independence with ADL's        Plan     Continued with current POC      Kim Carver PTA,   5/8/2023

## 2023-05-09 ENCOUNTER — OFFICE VISIT (OUTPATIENT)
Dept: OPTOMETRY | Facility: CLINIC | Age: 78
End: 2023-05-09
Payer: MEDICARE

## 2023-05-09 ENCOUNTER — DOCUMENTATION ONLY (OUTPATIENT)
Dept: REHABILITATION | Facility: HOSPITAL | Age: 78
End: 2023-05-09
Payer: MEDICARE

## 2023-05-09 DIAGNOSIS — H35.363 RETINAL DRUSEN OF BOTH EYES: ICD-10-CM

## 2023-05-09 DIAGNOSIS — H43.813 PVD (POSTERIOR VITREOUS DETACHMENT), BILATERAL: ICD-10-CM

## 2023-05-09 DIAGNOSIS — I10 ESSENTIAL HYPERTENSION: Chronic | ICD-10-CM

## 2023-05-09 DIAGNOSIS — G47.33 OSA ON CPAP: ICD-10-CM

## 2023-05-09 DIAGNOSIS — H18.413 ARCUS SENILIS OF BOTH CORNEAS: ICD-10-CM

## 2023-05-09 DIAGNOSIS — H25.13 NS (NUCLEAR SCLEROSIS), BILATERAL: ICD-10-CM

## 2023-05-09 DIAGNOSIS — H52.4 PRESBYOPIA: ICD-10-CM

## 2023-05-09 DIAGNOSIS — E11.9 TYPE 2 DIABETES MELLITUS WITHOUT OPHTHALMIC MANIFESTATIONS: Primary | ICD-10-CM

## 2023-05-09 PROCEDURE — 1126F AMNT PAIN NOTED NONE PRSNT: CPT | Mod: CPTII,S$GLB,, | Performed by: OPTOMETRIST

## 2023-05-09 PROCEDURE — 99999 PR PBB SHADOW E&M-EST. PATIENT-LVL III: CPT | Mod: PBBFAC,,, | Performed by: OPTOMETRIST

## 2023-05-09 PROCEDURE — 2023F PR DILATED RETINAL EXAM W/O EVID OF RETINOPATHY: ICD-10-PCS | Mod: CPTII,S$GLB,, | Performed by: OPTOMETRIST

## 2023-05-09 PROCEDURE — 92015 PR REFRACTION: ICD-10-PCS | Mod: S$GLB,,, | Performed by: OPTOMETRIST

## 2023-05-09 PROCEDURE — 1101F PR PT FALLS ASSESS DOC 0-1 FALLS W/OUT INJ PAST YR: ICD-10-PCS | Mod: CPTII,S$GLB,, | Performed by: OPTOMETRIST

## 2023-05-09 PROCEDURE — 99214 OFFICE O/P EST MOD 30 MIN: CPT | Mod: S$GLB,,, | Performed by: OPTOMETRIST

## 2023-05-09 PROCEDURE — 92015 DETERMINE REFRACTIVE STATE: CPT | Mod: S$GLB,,, | Performed by: OPTOMETRIST

## 2023-05-09 PROCEDURE — 1159F PR MEDICATION LIST DOCUMENTED IN MEDICAL RECORD: ICD-10-PCS | Mod: CPTII,S$GLB,, | Performed by: OPTOMETRIST

## 2023-05-09 PROCEDURE — 1126F PR PAIN SEVERITY QUANTIFIED, NO PAIN PRESENT: ICD-10-PCS | Mod: CPTII,S$GLB,, | Performed by: OPTOMETRIST

## 2023-05-09 PROCEDURE — 2023F DILAT RTA XM W/O RTNOPTHY: CPT | Mod: CPTII,S$GLB,, | Performed by: OPTOMETRIST

## 2023-05-09 PROCEDURE — 1159F MED LIST DOCD IN RCRD: CPT | Mod: CPTII,S$GLB,, | Performed by: OPTOMETRIST

## 2023-05-09 PROCEDURE — 99499 UNLISTED E&M SERVICE: CPT | Mod: S$GLB,,, | Performed by: OPTOMETRIST

## 2023-05-09 PROCEDURE — 99214 PR OFFICE/OUTPT VISIT, EST, LEVL IV, 30-39 MIN: ICD-10-PCS | Mod: S$GLB,,, | Performed by: OPTOMETRIST

## 2023-05-09 PROCEDURE — 99999 PR PBB SHADOW E&M-EST. PATIENT-LVL III: ICD-10-PCS | Mod: PBBFAC,,, | Performed by: OPTOMETRIST

## 2023-05-09 PROCEDURE — 1101F PT FALLS ASSESS-DOCD LE1/YR: CPT | Mod: CPTII,S$GLB,, | Performed by: OPTOMETRIST

## 2023-05-09 PROCEDURE — 3288F PR FALLS RISK ASSESSMENT DOCUMENTED: ICD-10-PCS | Mod: CPTII,S$GLB,, | Performed by: OPTOMETRIST

## 2023-05-09 PROCEDURE — 3288F FALL RISK ASSESSMENT DOCD: CPT | Mod: CPTII,S$GLB,, | Performed by: OPTOMETRIST

## 2023-05-09 NOTE — PROGRESS NOTES
HPI    Pt is here today for DFE due to DM.  She states her vision seems stable since her last exam.    No current eye meds.  Last edited by Julito Christopher on 5/9/2023  1:19 PM.            Assessment /Plan     For exam results, see Encounter Report.          PVD (posterior vitreous detachment), bilateral  Retinal drusen of both eyes  Arcus senilis of both corneas  Type 2 diabetes mellitus without ophthalmic manifestations  Essential hypertension              No retinopathy, monitor yearly     NS (nuclear sclerosis), bilateral            borderline significant at this time    Consider cat eval next visit  KAMRAN on CPAP     Presbyopia              Rx specs                   RTC 1 year, sooner PRN

## 2023-05-09 NOTE — PROGRESS NOTES
PT/PTA met face to face to discuss pt's treatment plan and progress towards established goals. Pt will be seen by a physical therapist minimally every 6th visit or every 30 days.    Please see Updated Plan of Care for changes and updated goals.       Kim Carver, PTA

## 2023-05-10 ENCOUNTER — CLINICAL SUPPORT (OUTPATIENT)
Dept: REHABILITATION | Facility: HOSPITAL | Age: 78
End: 2023-05-10
Attending: ORTHOPAEDIC SURGERY
Payer: MEDICARE

## 2023-05-10 DIAGNOSIS — M25.661 DECREASED RANGE OF MOTION OF RIGHT KNEE: Primary | ICD-10-CM

## 2023-05-10 DIAGNOSIS — R26.89 GAIT, ANTALGIC: ICD-10-CM

## 2023-05-10 PROCEDURE — 97140 MANUAL THERAPY 1/> REGIONS: CPT | Mod: PN

## 2023-05-10 PROCEDURE — 97530 THERAPEUTIC ACTIVITIES: CPT | Mod: PN

## 2023-05-10 PROCEDURE — 97110 THERAPEUTIC EXERCISES: CPT | Mod: PN

## 2023-05-10 NOTE — PROGRESS NOTES
Physical Therapy Daily Treatment Note     Name: Kaylee Romero  Clinic Number: [id number]    Therapy Diagnosis:        Encounter Diagnosis   Name Primary?    Primary osteoarthritis of right knee       Physician: Peterson Sharma MD    Visit Date: 5/10/2023     Physician Orders: PT Eval and Treat   Medical Diagnosis from Referral: M17.11 (ICD-10-CM) - Primary osteoarthritis of right knee  Evaluation Date: 3/2/2023  Plan of Care Expiration: 23 or 24th Visit  Authorization Period Expiration: 3/30/23  Visit # / Visits authorized:   Remaining Visits Scheduled - 11  PTA Visit #: 0/6    Eval Visit FOTO-  2023 - 42.80  5th Visit FOTO   -  2023  10th Visit FOTO  -  (Date/Score/Turn Green)   D/C FOTO          -  (Date/Score/Turn Green)      Time In: 2:36  Time Out: 3:15  Billable Time: 39 minutes   Non-Billable Time: 00 minutes    Precautions: Standard and Fall  Insurance: Payor: [employer] / Plan: [employer] 65 / Product Type: Medicare Advantage /     Subjective     Pt reports: my knee is hurting from doing my stationary bike at home   She is compliant with home exercise program.  Response to previous treatment: fine    Pre-Treatment Pain Ratin/10  Post-Treatment Pain Ratin/10  Location: right knee  (right side of the knee)    Objective        Range of Motion/Strength:      Knee Left Right Pain/Dysfunction with Movement   AROM         Knee Flexion (140)  105 °   108 °  Pain    Knee Extension (0)  0 °   0 °  Pain        Kaylee received therapeutic exercises to develop strength, ROM, and flexibility for 15 minutes including:    ROM assessment  Warm-up    Supine    Quad Sets with towel right LE - 30 reps with 5 sec. Hold   Heel slides with strap - 5 minutes  SLR rightLE  - 3x5 reps (attempted but pt unable to perform)  Short Arc Quad 3x10 (blue bolster)  Long Arc Quad #1 3x10  Hip Adduction - 30 reps   Prone HS curl with right LE - 30 reps   Patella Mob - 1' in  each direction   Long sitting hip adduction 3x10    Seated    Marching in place 3x10 B sides GTB  Hip abduction 3x10 B sides GTB  Hip adduction w/ball 2-3'' hold 3x10  Heel slides seated - 5' with and 3' without strap  Heel to toe 30x   Ankle pumps x 30  Hamstring stretch 3x30s  Gastroc stretch 3x30s  Hamstring curls RTB 3x10    Standing        *Bold exercise performed     Kaylee received the following manual therapy techniques: Soft tissue Mobilization were applied to the: R knee for 10 minutes, including:    Manual Therapy  (amplitude and time)     Knee flexion PROM  NP   STM to anterior aspect of knee NP   Patella Mobilization  Done   CRFM to lateral aspect of knee NP   Scar tissue massage Done         Kaylee participated in neuromuscular re-education activities to improve: Balance, Coordination, Kinesthetic, and Proprioception for 5 minutes. The following activities were included:  Romberg balance eyes closed 3x30s  B Semi-tandem stance balance 3x30s  +TKE in standing and Upper Extremity support  + Weight shifts    Kaylee participated in dynamic functional therapeutic activities to improve functional performance for 10  minutes, including:    Activities performed   (duration/resistance)     Sit to stand 3x5 - Done                         Kaylee received the following supervised modalities after being cleared for contradictions: IFC Electrical Stimulation:  Kaylee received IFC Electrical Stimulation for pain control applied to the R knee. Pt received stimulation at 10V at a carrier frequency of 4000Hz through 80-150Hz for 00 minutes. Kaylee tolderated treatment well without any adverse effects.  - NP today (pt stated she's felt her nerves being stimulated since last session and she did not like e.stim)      Home Exercises Provided and Patient Education Provided     Education provided:   - Continue with HEP    Written Home Exercises Provided: Patient instructed to cont prior HEP.  Exercises were reviewed and Kaylee was  able to demonstrate them prior to the end of the session.  Kaylee demonstrated good  understanding of the education provided.     See EMR under Patient Instructions for exercises provided prior visit.    Assessment     Pt tolerated session well. She presented with increased fatigue, 3 rest breaks taken with timed heel slides.  STM attempted to reduce knee pain, Scar tissue mobilization  performed to break up scar tissue and improve ROM. Pt stated knee pain increased to 7/10 after scar tissue mobilization. PTA advised pt to ice and elevated when she returns home to decrease swelling and discomfort. Pt had no difficulty ambulating out of clinic and will continue to benefit from skilled therapy.       Kaylee Is progressing well towards Her goals.     Pt prognosis is Good.     Pt will continue to benefit from skilled outpatient physical therapy to address the deficits listed in the problem list box on initial evaluation, provide pt/family education and to maximize pt's level of independence in the home and community environment.     Pt's spiritual, cultural and educational needs considered and pt agreeable to plan of care and goals.    Anticipated barriers to physical therapy: PMH    Goals:    Short Term Goals: 6 weeks  Goal   Status    Pt. to report decreased right knee pain  </= 5/10 at worst to increase tolerance for prolong standing   Progressing 5/10/2023     Pt. to demonstrate proper gait mechanics requiring minimum verbal cues from PT Progressing 5/10/2023      Pt. to demonstrate an increase right knee AROM to 95 degrees to improve ease with stair negotiation. Progressing 5/10/2023      Pt to be Independent with HEP to improve ROM and independence with ADL's Progressing 5/10/2023            Long Term Goals: 12 weeks  Goal Status    Pt. to report decreased right knee pain  </= 2/10 at worst to increase tolerance for prolong standing      Pt. to demonstrate proper gait mechanics requiring no verbal cues from PT      Pt. to demonstrate an increase right knee AROM to >110 degrees to improve ease with stair negotiation.     Pt. to demonstrate increased MMT for right hip abductors to 4/5  to increase stability with ambulation on even surfaces.     Pt. to demonstrate increased MMT for right quadriceps to 4/5 to increase ability to negotiate stairs     Pt. to demonstrate increased MMT for right4 hamstring  to 4/5 to increase tolerance to squatting     Pt to be Independent with HEP to improve ROM and independence with ADL's        Plan     Continued with current POC      Maxwell Brewer, PT,   5/10/2023

## 2023-05-12 ENCOUNTER — CLINICAL SUPPORT (OUTPATIENT)
Dept: REHABILITATION | Facility: HOSPITAL | Age: 78
End: 2023-05-12
Attending: ORTHOPAEDIC SURGERY
Payer: MEDICARE

## 2023-05-12 DIAGNOSIS — M25.661 DECREASED RANGE OF MOTION OF RIGHT KNEE: Primary | ICD-10-CM

## 2023-05-12 DIAGNOSIS — R26.89 GAIT, ANTALGIC: ICD-10-CM

## 2023-05-12 PROCEDURE — 97110 THERAPEUTIC EXERCISES: CPT | Mod: PN,CQ

## 2023-05-12 PROCEDURE — 97530 THERAPEUTIC ACTIVITIES: CPT | Mod: PN,CQ

## 2023-05-12 NOTE — PROGRESS NOTES
"  Physical Therapy Daily Treatment Note     Name: Kaylee Romero  Clinic Number: [id number]    Therapy Diagnosis:        Encounter Diagnosis   Name Primary?    Primary osteoarthritis of right knee       Physician: Peetrson Sharma MD    Visit Date: 2023     Physician Orders: PT Eval and Treat   Medical Diagnosis from Referral: M17.11 (ICD-10-CM) - Primary osteoarthritis of right knee  Evaluation Date: 3/2/2023  Plan of Care Expiration: 23 or 24th Visit  Authorization Period Expiration: 3/30/23  Visit # / Visits authorized:   Remaining Visits Scheduled - 10  PTA Visit #:     Eval Visit FOTO-  2023 - 42.80  5th Visit FOTO   -  2023 - 48.5  10th Visit FOTO  -  (Date/Score/Turn Green)   D/C FOTO          -  (Date/Score/Turn Green)      Time In: 1:52  Time Out: 2:30  Billable Time: 38 minutes   Non-Billable Time: 00 minutes    Precautions: Standard and Fall  Insurance: Payor: [employer] / Plan: [employer] 65 / Product Type: Medicare Advantage /     Subjective     Pt reports: Pt ambulated into clinic with SPC, stated she twisted knee this morning but it feels fine  She is compliant with home exercise program.  Response to previous treatment: "wiped out"    Pre-Treatment Pain Ratin/10  Post-Treatment Pain Ratin/10  Location: right knee  (right side of the knee)    Objective        Range of Motion/Strength:      Knee Left Right Pain/Dysfunction with Movement   AROM         Knee Flexion (140)  105 °   108 °  Pain    Knee Extension (0)  0 °   0 °  Pain        Kaylee received therapeutic exercises to develop strength, ROM, and flexibility for 28 minutes including:    ROM assessment  Warm-up    Supine    Quad Sets with towel right LE - 30 reps with 5 sec. Hold   Heel slides with strap - 5 minutes  SLR rightLE  - 3x5 reps (attempted but pt unable to perform)  Short Arc Quad 3x10 (blue bolster)  Long Arc Quad #1.5 3x10  Hip Adduction - 30 reps     Long sitting " hip adduction 3x10    Seated    Marching in place 3x10 B sides GTB  Hip abduction 3x10 B sides GTB  Hip adduction w/ball 2-3'' hold 3x10  Heel slides seated - 5' with and 3' without strap  Heel to toe 30x   Ankle pumps x 30  Hamstring stretch 3x30s  Gastroc stretch 3x30s  Hamstring curls RTB 3x10    Standing        *Bold exercise performed     Kaylee received the following manual therapy techniques: Soft tissue Mobilization were applied to the: R knee for 00 minutes, including:    Manual Therapy  (amplitude and time)     Knee flexion PROM  NP   STM to anterior aspect of knee NP   Patella Mobilization  NP   CRFM to lateral aspect of knee NP   Scar tissue massage NP         Kaylee participated in neuromuscular re-education activities to improve: Balance, Coordination, Kinesthetic, and Proprioception for 00 minutes. The following activities were included:  Romberg balance on airex  3x30s  B Semi-tandem stance balance 3x30s      Kaylee participated in dynamic functional therapeutic activities to improve functional performance for 10  minutes, including:    Activities performed   (duration/resistance)     Sit to stand 3x5 - Done   Lateral walks at table 3x30s Done                     Kaylee received the following supervised modalities after being cleared for contradictions: IFC Electrical Stimulation:  Kaylee received IFC Electrical Stimulation for pain control applied to the R knee. Pt received stimulation at 10V at a carrier frequency of 4000Hz through 80-150Hz for 00 minutes. Kaylee tolderated treatment well without any adverse effects.  - NP today (pt stated she's felt her nerves being stimulated since last session and she did not like e.stim)      Home Exercises Provided and Patient Education Provided     Education provided:   - Continue with HEP    Written Home Exercises Provided: Patient instructed to cont prior HEP.  Exercises were reviewed and Kaylee was able to demonstrate them prior to the end of the session.  Kaylee  demonstrated good  understanding of the education provided.     See EMR under Patient Instructions for exercises provided prior visit.    Assessment     Pt tolerated session well. She presented with increased fatigue. Rest  taken after each minute of seated heel slides. Lateral walk added to improve balance, tolerance for standing, and ADL. Pt tolerated well, seated rest breaks needed due to fatigue. Kaylee had no adverse effects with exercises today ad will continue to benefit from skilled therapy.       Kaylee Is progressing well towards Her goals.     Pt prognosis is Good.     Pt will continue to benefit from skilled outpatient physical therapy to address the deficits listed in the problem list box on initial evaluation, provide pt/family education and to maximize pt's level of independence in the home and community environment.     Pt's spiritual, cultural and educational needs considered and pt agreeable to plan of care and goals.    Anticipated barriers to physical therapy: PMH    Goals:    Short Term Goals: 6 weeks  Goal   Status    Pt. to report decreased right knee pain  </= 5/10 at worst to increase tolerance for prolong standing   Progressing 5/12/2023     Pt. to demonstrate proper gait mechanics requiring minimum verbal cues from PT Progressing 5/12/2023      Pt. to demonstrate an increase right knee AROM to 95 degrees to improve ease with stair negotiation. Progressing 5/12/2023      Pt to be Independent with HEP to improve ROM and independence with ADL's Progressing 5/12/2023            Long Term Goals: 12 weeks  Goal Status    Pt. to report decreased right knee pain  </= 2/10 at worst to increase tolerance for prolong standing      Pt. to demonstrate proper gait mechanics requiring no verbal cues from PT     Pt. to demonstrate an increase right knee AROM to >110 degrees to improve ease with stair negotiation.     Pt. to demonstrate increased MMT for right hip abductors to 4/5  to increase stability with  ambulation on even surfaces.     Pt. to demonstrate increased MMT for right quadriceps to 4/5 to increase ability to negotiate stairs     Pt. to demonstrate increased MMT for right4 hamstring  to 4/5 to increase tolerance to squatting     Pt to be Independent with HEP to improve ROM and independence with ADL's        Plan     Continued with current POC      Kim Carver PTA,   5/12/2023

## 2023-05-15 ENCOUNTER — CLINICAL SUPPORT (OUTPATIENT)
Dept: REHABILITATION | Facility: HOSPITAL | Age: 78
End: 2023-05-15
Attending: ORTHOPAEDIC SURGERY
Payer: MEDICARE

## 2023-05-15 DIAGNOSIS — M25.661 DECREASED RANGE OF MOTION OF RIGHT KNEE: Primary | ICD-10-CM

## 2023-05-15 DIAGNOSIS — R26.89 GAIT, ANTALGIC: ICD-10-CM

## 2023-05-15 PROCEDURE — 97530 THERAPEUTIC ACTIVITIES: CPT | Mod: PN,CQ

## 2023-05-15 PROCEDURE — 97110 THERAPEUTIC EXERCISES: CPT | Mod: PN,CQ

## 2023-05-15 PROCEDURE — 97112 NEUROMUSCULAR REEDUCATION: CPT | Mod: PN,CQ

## 2023-05-15 NOTE — PROGRESS NOTES
Physical Therapy Daily Treatment Note     Name: Kaylee Romero  Clinic Number: [id number]    Therapy Diagnosis:        Encounter Diagnosis   Name Primary?    Primary osteoarthritis of right knee       Physician: Peterson Sharma MD    Visit Date: 5/15/2023     Physician Orders: PT Eval and Treat   Medical Diagnosis from Referral: M17.11 (ICD-10-CM) - Primary osteoarthritis of right knee  Evaluation Date: 3/2/2023  Plan of Care Expiration: 23 or 24th Visit  Authorization Period Expiration: 3/30/23  Visit # / Visits authorized:   Remaining Visits Scheduled - 9  PTA Visit #: 2/6    Eval Visit FOTO-  2023 - 42.80  5th Visit FOTO   -  2023 - 48.5  10th Visit FOTO  -  (Date/Score/Turn Green)   D/C FOTO          -  (Date/Score/Turn Green)      Time In: 2:40  Time Out: 3:18  Billable Time: 38 minutes   Non-Billable Time: 00 minutes    Precautions: Standard and Fall  Insurance: Payor: [employer] / Plan: [employer] / Product Type: Medicare Advantage /     Subjective     Pt reports: No pain today and is afraid to use bike at home but wants to use her treadmill  She is compliant with home exercise program.  Response to previous treatment: tired    Pre-Treatment Pain Ratin/10  Post-Treatment Pain Ratin/10  Location: right knee  (right side of the knee)    Objective        Range of Motion/Strength:      Knee Left Right Pain/Dysfunction with Movement   AROM         Knee Flexion (140)  105 °   108 °  Pain    Knee Extension (0)  0 °   0 °  Pain        Kaylee received therapeutic exercises to develop strength, ROM, and flexibility for 18 minutes including:    ROM assessment  Warm-up    Supine    Quad Sets with towel right LE - 30 reps with 5 sec. Hold   Heel slides with strap - 5 minutes  SLR rightLE  - 3x5 reps (attempted but pt unable to perform)  Short Arc Quad 3x10 (blue bolster)  Long Arc Quad #1.5 3x10  Hip Adduction - 30 reps     Long sitting hip adduction  3x10    Seated    Marching in place 3x10 B sides GTB (no resistance today)  Hip abduction 3x10 B sides GTB  Hip adduction w/ball 2-3'' hold 3x10  Heel slides seated - 5' with and 3' without strap  Heel to toe 30x   Ankle pumps x 30  Hamstring stretch 3x30s  Gastroc stretch 3x30s  Hamstring curls RTB 3x10    Standing        *Bold exercise performed     Kaylee received the following manual therapy techniques: Soft tissue Mobilization were applied to the: R knee for 00 minutes, including:    Manual Therapy  (amplitude and time)     Knee flexion PROM  NP   STM to anterior aspect of knee NP   Patella Mobilization  NP   CRFM to lateral aspect of knee NP   Scar tissue massage NP         Kaylee participated in neuromuscular re-education activities to improve: Balance, Coordination, Kinesthetic, and Proprioception for 10 minutes. The following activities were included:  Romberg balance on airex  3x30s  Romberg balance eyes closed/head turns 2x30s each   B Semi-tandem stance balance 3x30s      Kaylee participated in dynamic functional therapeutic activities to improve functional performance for 10  minutes, including:    Activities performed   (duration/resistance)     Sit to stand 4x5 - Done   Lateral walks at table 4x30s Done                     Kaylee received the following supervised modalities after being cleared for contradictions: IFC Electrical Stimulation:  Kaylee received IFC Electrical Stimulation for pain control applied to the R knee. Pt received stimulation at 10V at a carrier frequency of 4000Hz through 80-150Hz for 00 minutes. Kaylee tolderated treatment well without any adverse effects.  - NP today (pt stated she's felt her nerves being stimulated since last session and she did not like e.stim)      Home Exercises Provided and Patient Education Provided     Education provided:   - Continue with HEP    Written Home Exercises Provided: Patient instructed to cont prior HEP.  Exercises were reviewed and Kaylee was able  to demonstrate them prior to the end of the session.  Kaylee demonstrated good  understanding of the education provided.     See EMR under Patient Instructions for exercises provided prior visit.    Assessment     Pt tolerated session well, tolerance for exercise improved today. Number of sit to stand reps increased. Pt completed all lateral walk trials without long rest breaks between trials. Rhomberg stance with head turns challenging but pt completed all other balance stances well. Kaylee had no adverse effects with exercises today and will continue to benefit from skilled therapy.       Kaylee Is progressing well towards Her goals.     Pt prognosis is Good.     Pt will continue to benefit from skilled outpatient physical therapy to address the deficits listed in the problem list box on initial evaluation, provide pt/family education and to maximize pt's level of independence in the home and community environment.     Pt's spiritual, cultural and educational needs considered and pt agreeable to plan of care and goals.    Anticipated barriers to physical therapy: PMH    Goals:    Short Term Goals: 6 weeks  Goal   Status    Pt. to report decreased right knee pain  </= 5/10 at worst to increase tolerance for prolong standing   Progressing 5/15/2023     Pt. to demonstrate proper gait mechanics requiring minimum verbal cues from PT Progressing 5/15/2023      Pt. to demonstrate an increase right knee AROM to 95 degrees to improve ease with stair negotiation. Progressing 5/15/2023      Pt to be Independent with HEP to improve ROM and independence with ADL's Progressing 5/15/2023            Long Term Goals: 12 weeks  Goal Status    Pt. to report decreased right knee pain  </= 2/10 at worst to increase tolerance for prolong standing      Pt. to demonstrate proper gait mechanics requiring no verbal cues from PT     Pt. to demonstrate an increase right knee AROM to >110 degrees to improve ease with stair negotiation.     Pt.  to demonstrate increased MMT for right hip abductors to 4/5  to increase stability with ambulation on even surfaces.     Pt. to demonstrate increased MMT for right quadriceps to 4/5 to increase ability to negotiate stairs     Pt. to demonstrate increased MMT for right4 hamstring  to 4/5 to increase tolerance to squatting     Pt to be Independent with HEP to improve ROM and independence with ADL's        Plan     Continued with current POC      Kim Carver PTA,   5/15/2023

## 2023-05-18 ENCOUNTER — CLINICAL SUPPORT (OUTPATIENT)
Dept: REHABILITATION | Facility: HOSPITAL | Age: 78
End: 2023-05-18
Attending: ORTHOPAEDIC SURGERY
Payer: MEDICARE

## 2023-05-18 DIAGNOSIS — R26.89 GAIT, ANTALGIC: ICD-10-CM

## 2023-05-18 DIAGNOSIS — M25.661 DECREASED RANGE OF MOTION OF RIGHT KNEE: Primary | ICD-10-CM

## 2023-05-18 PROCEDURE — 97110 THERAPEUTIC EXERCISES: CPT | Mod: PN

## 2023-05-18 PROCEDURE — 97530 THERAPEUTIC ACTIVITIES: CPT | Mod: PN

## 2023-05-18 PROCEDURE — 97112 NEUROMUSCULAR REEDUCATION: CPT | Mod: PN

## 2023-05-18 NOTE — PROGRESS NOTES
Physical Therapy Daily Treatment Note     Name: Kaylee Romero  Clinic Number: [id number]    Therapy Diagnosis:        Encounter Diagnosis   Name Primary?    Primary osteoarthritis of right knee       Physician: Peterson Sharma MD    Visit Date: 2023     Physician Orders: PT Eval and Treat   Medical Diagnosis from Referral: M17.11 (ICD-10-CM) - Primary osteoarthritis of right knee  Evaluation Date: 3/2/2023  Plan of Care Expiration: 23 or 24th Visit  Authorization Period Expiration: 3/30/23  Visit # / Visits authorized:   Remaining Visits Scheduled - 9  PTA Visit #: 0/6    Eval Visit FOTO-  2023 - 42.80  5th Visit FOTO   -  2023 - 48.5  10th Visit FOTO  -  (Date/Score/Turn Green)   D/C FOTO          -  (Date/Score/Turn Green)      Time In: 2:40  Time Out: 3:18  Billable Time: 38 minutes   Non-Billable Time: 00 minutes    Precautions: Standard and Fall  Insurance: Payor: [employer] / Plan: [employer] 65 / Product Type: Medicare Advantage /     Subjective     Pt reports: No pain today and is afraid to use bike at home but wants to use her treadmill  She is compliant with home exercise program.  Response to previous treatment: tired    Pre-Treatment Pain Ratin/10  Post-Treatment Pain Ratin/10  Location: right knee  (right side of the knee)    Objective        Range of Motion/Strength:      Knee Left Right Pain/Dysfunction with Movement   AROM         Knee Flexion (140)  105 °   108 °  Pain    Knee Extension (0)  0 °   0 °  Pain        Kaylee received therapeutic exercises to develop strength, ROM, and flexibility for 18 minutes including:    ROM assessment  Warm-up    Supine    Quad Sets with towel right LE - 30 reps with 5 sec. Hold   Heel slides with strap - 5 minutes  SLR rightLE  - 3x5 reps (attempted but pt unable to perform)  Short Arc Quad 3x10 (blue bolster)  Long Arc Quad #1.5 3x10  Hip Adduction - 30 reps     Long sitting hip adduction  3x10    Seated    Marching in place 3x10 B sides GTB (no resistance today)  Hip abduction 3x10 B sides GTB  Hip adduction w/ball 2-3'' hold 3x10  Heel slides seated - 5' with and 3' without strap  Heel to toe 30x   Ankle pumps x 30  Hamstring stretch 3x30s  Gastroc stretch 3x30s  Hamstring curls RTB 3x10    Standing        *Bold exercise performed     Kaylee received the following manual therapy techniques: Soft tissue Mobilization were applied to the: R knee for 00 minutes, including:    Manual Therapy  (amplitude and time)     Knee flexion PROM  NP   STM to anterior aspect of knee NP   Patella Mobilization  NP   CRFM to lateral aspect of knee NP   Scar tissue massage NP         Kaylee participated in neuromuscular re-education activities to improve: Balance, Coordination, Kinesthetic, and Proprioception for 10 minutes. The following activities were included:  Romberg balance on airex  3x30s  Romberg balance eyes closed/head turns 2x30s each   B Semi-tandem stance balance 3x30s      Kaylee participated in dynamic functional therapeutic activities to improve functional performance for 10  minutes, including:    Activities performed   (duration/resistance)     Sit to stand 4x5 - Done   Lateral walks at table 4x30s Done                     Kaylee received the following supervised modalities after being cleared for contradictions: IFC Electrical Stimulation:  Kaylee received IFC Electrical Stimulation for pain control applied to the R knee. Pt received stimulation at 10V at a carrier frequency of 4000Hz through 80-150Hz for 00 minutes. Kaylee tolderated treatment well without any adverse effects.  - NP today (pt stated she's felt her nerves being stimulated since last session and she did not like e.stim)      Home Exercises Provided and Patient Education Provided     Education provided:   - Continue with HEP    Written Home Exercises Provided: Patient instructed to cont prior HEP.  Exercises were reviewed and Kaylee was able  to demonstrate them prior to the end of the session.  Kaylee demonstrated good  understanding of the education provided.     See EMR under Patient Instructions for exercises provided prior visit.    Assessment     Patient currently presents with continued range of motion limitations and balance deficits with airex during balance activity performed during today's treatment session. Kaylee had no adverse effects with exercises today and will continue to benefit from skilled therapy.       Kaylee Is progressing well towards Her goals.     Pt prognosis is Good.     Pt will continue to benefit from skilled outpatient physical therapy to address the deficits listed in the problem list box on initial evaluation, provide pt/family education and to maximize pt's level of independence in the home and community environment.     Pt's spiritual, cultural and educational needs considered and pt agreeable to plan of care and goals.    Anticipated barriers to physical therapy: PMH    Goals:    Short Term Goals: 6 weeks  Goal   Status    Pt. to report decreased right knee pain  </= 5/10 at worst to increase tolerance for prolong standing   Progressing 5/18/2023     Pt. to demonstrate proper gait mechanics requiring minimum verbal cues from PT Progressing 5/18/2023      Pt. to demonstrate an increase right knee AROM to 95 degrees to improve ease with stair negotiation. Progressing 5/18/2023      Pt to be Independent with HEP to improve ROM and independence with ADL's Progressing 5/18/2023            Long Term Goals: 12 weeks  Goal Status    Pt. to report decreased right knee pain  </= 2/10 at worst to increase tolerance for prolong standing      Pt. to demonstrate proper gait mechanics requiring no verbal cues from PT     Pt. to demonstrate an increase right knee AROM to >110 degrees to improve ease with stair negotiation.     Pt. to demonstrate increased MMT for right hip abductors to 4/5  to increase stability with ambulation on even  surfaces.     Pt. to demonstrate increased MMT for right quadriceps to 4/5 to increase ability to negotiate stairs     Pt. to demonstrate increased MMT for right4 hamstring  to 4/5 to increase tolerance to squatting     Pt to be Independent with HEP to improve ROM and independence with ADL's        Plan     Continued with current POC      Maxwell Brewer, PT,   5/18/2023

## 2023-05-22 ENCOUNTER — CLINICAL SUPPORT (OUTPATIENT)
Dept: REHABILITATION | Facility: HOSPITAL | Age: 78
End: 2023-05-22
Attending: ORTHOPAEDIC SURGERY
Payer: MEDICARE

## 2023-05-22 DIAGNOSIS — M25.661 DECREASED RANGE OF MOTION OF RIGHT KNEE: Primary | ICD-10-CM

## 2023-05-22 DIAGNOSIS — R26.89 GAIT, ANTALGIC: ICD-10-CM

## 2023-05-22 PROCEDURE — 97112 NEUROMUSCULAR REEDUCATION: CPT | Mod: PN

## 2023-05-22 PROCEDURE — 97110 THERAPEUTIC EXERCISES: CPT | Mod: PN

## 2023-05-22 PROCEDURE — 97530 THERAPEUTIC ACTIVITIES: CPT | Mod: PN

## 2023-05-22 NOTE — PROGRESS NOTES
Physical Therapy Daily Treatment Note     Name: Kaylee Romero  Clinic Number: [id number]    Therapy Diagnosis:        Encounter Diagnosis   Name Primary?    Primary osteoarthritis of right knee       Physician: Peterson Sharma MD    Visit Date: 2023     Physician Orders: PT Eval and Treat   Medical Diagnosis from Referral: M17.11 (ICD-10-CM) - Primary osteoarthritis of right knee  Evaluation Date: 3/2/2023  Plan of Care Expiration: 23 or 24th Visit  Authorization Period Expiration: 3/30/23  Visit # / Visits authorized:   Remaining Visits Scheduled - 9  PTA Visit #: 0/6    Eval Visit FOTO-  2023 - 42.80  5th Visit FOTO   -  2023 - 48.5  10th Visit FOTO  -  (Date/Score/Turn Green)   D/C FOTO          -  (Date/Score/Turn Green)      Time In: 2:30 PM  Time Out: 3:15 PM  Billable Time: 45 minutes (1TE, 1NR, 1TA)  Non-Billable Time: 00 minutes    Precautions: Standard and Fall  Insurance: Payor: [employer]ARE / Plan: [employer] 65 / Product Type: Medicare Advantage /     Subjective     Pt reports: no difficulty with home exercises and no pain at R knee.    She is compliant with home exercise program.  Response to previous treatment: tired    Pre-Treatment Pain Ratin/10  Post-Treatment Pain Ratin/10  Location: right knee  (right side of the knee)    Objective        Range of Motion/Strength:      Knee Left Right Pain/Dysfunction with Movement   AROM         Knee Flexion (140)  105 °   108 °  Pain    Knee Extension (0)  0 °   0 °  Pain        Kaylee received therapeutic exercises to develop strength, ROM, and flexibility for 25 minutes including:    ROM assessment  Warm-up    Supine    Quad Sets with towel right LE - 30 reps with 5 sec. Hold   Heel slides with strap - 5 minutes  SLR rightLE  - 3x5 reps (attempted but pt unable to perform)  Short Arc Quad 3x10 (blue bolster)  Long Arc Quad #1.5 3x10  Hip Adduction - 30 reps     Long sitting hip adduction  3x10    Seated    Marching in place 3x10 B sides GTB (no resistance today)  Hip abduction 3x10 B sides RTB  Hip adduction w/ball 2-3'' hold 3x10  Heel slides seated - 5' with and 3' without strap  Heel to toe 30x   Ankle pumps x 30  Hamstring stretch 3x30s  Gastroc stretch 3x30s    Hamstring curls RTB 3x10    Standing        *Bold exercise performed     Kaylee received the following manual therapy techniques: Soft tissue Mobilization were applied to the: R knee for 00 minutes, including:    Manual Therapy  (amplitude and time)     Knee flexion PROM  NP   STM to anterior aspect of knee NP   Patella Mobilization  NP   CRFM to lateral aspect of knee NP   Scar tissue massage NP         Kaylee participated in neuromuscular re-education activities to improve: Balance, Coordination, Kinesthetic, and Proprioception for 10 minutes. The following activities were included:  Romberg balance on airex  3x30s  Romberg balance eyes closed/head turns 2x30s each   B Semi-tandem stance balance 3x30s      Kaylee participated in dynamic functional therapeutic activities to improve functional performance for 10  minutes, including:    Activities performed   (duration/resistance)     Sit to stand 4x5 - Done   Lateral walks at table 4x30s NP   8 inch steps 2x5 Done                 Kaylee received the following supervised modalities after being cleared for contradictions: IFC Electrical Stimulation:  Kaylee received IFC Electrical Stimulation for pain control applied to the R knee. Pt received stimulation at 10V at a carrier frequency of 4000Hz through 80-150Hz for 00 minutes. Kaylee tolderated treatment well without any adverse effects.  - NP today (pt stated she's felt her nerves being stimulated since last session and she did not like e.stim)      Home Exercises Provided and Patient Education Provided     Education provided:   - Continue with HEP    Written Home Exercises Provided: Patient instructed to cont prior HEP.  Exercises were reviewed  and Kaylee was able to demonstrate them prior to the end of the session.  Kaylee demonstrated good  understanding of the education provided.     See EMR under Patient Instructions for exercises provided prior visit.    Assessment     Pt tolerated session well, tolerance for exercise improved today. Patient continues to note no pain in R knee and is progressing well with functional exercises and activities. Patient added 8 inch steps with supervision assistance and railing for support. Patient noted difficulty with steps but no pain increase. Patient to continue with current treatment plan.      Kaylee Is progressing well towards Her goals.     Pt prognosis is Good.     Pt will continue to benefit from skilled outpatient physical therapy to address the deficits listed in the problem list box on initial evaluation, provide pt/family education and to maximize pt's level of independence in the home and community environment.     Pt's spiritual, cultural and educational needs considered and pt agreeable to plan of care and goals.    Anticipated barriers to physical therapy: PMH    Goals:    Short Term Goals: 6 weeks  Goal   Status    Pt. to report decreased right knee pain  </= 5/10 at worst to increase tolerance for prolong standing   Progressing 5/22/2023     Pt. to demonstrate proper gait mechanics requiring minimum verbal cues from PT Progressing 5/22/2023      Pt. to demonstrate an increase right knee AROM to 95 degrees to improve ease with stair negotiation. Progressing 5/22/2023      Pt to be Independent with HEP to improve ROM and independence with ADL's Progressing 5/22/2023            Long Term Goals: 12 weeks  Goal Status    Pt. to report decreased right knee pain  </= 2/10 at worst to increase tolerance for prolong standing      Pt. to demonstrate proper gait mechanics requiring no verbal cues from PT     Pt. to demonstrate an increase right knee AROM to >110 degrees to improve ease with stair negotiation.      Pt. to demonstrate increased MMT for right hip abductors to 4/5  to increase stability with ambulation on even surfaces.     Pt. to demonstrate increased MMT for right quadriceps to 4/5 to increase ability to negotiate stairs     Pt. to demonstrate increased MMT for right4 hamstring  to 4/5 to increase tolerance to squatting     Pt to be Independent with HEP to improve ROM and independence with ADL's        Plan     Continued with current POC      Bryan Brown, PT, DPT  5/22/2023

## 2023-05-25 ENCOUNTER — CLINICAL SUPPORT (OUTPATIENT)
Dept: REHABILITATION | Facility: HOSPITAL | Age: 78
End: 2023-05-25
Attending: ORTHOPAEDIC SURGERY
Payer: MEDICARE

## 2023-05-25 DIAGNOSIS — R26.89 GAIT, ANTALGIC: ICD-10-CM

## 2023-05-25 DIAGNOSIS — M25.661 DECREASED RANGE OF MOTION OF RIGHT KNEE: Primary | ICD-10-CM

## 2023-05-25 PROCEDURE — 97110 THERAPEUTIC EXERCISES: CPT | Mod: PN,CQ

## 2023-05-25 PROCEDURE — 97530 THERAPEUTIC ACTIVITIES: CPT | Mod: PN,CQ

## 2023-05-25 PROCEDURE — 97112 NEUROMUSCULAR REEDUCATION: CPT | Mod: PN,CQ

## 2023-05-25 NOTE — PROGRESS NOTES
Physical Therapy Daily Treatment Note     Name: Kaylee Romero  Clinic Number: [id number]    Therapy Diagnosis:        Encounter Diagnosis   Name Primary?    Primary osteoarthritis of right knee       Physician: Peterson Sharma MD    Visit Date: 2023     Physician Orders: PT Eval and Treat   Medical Diagnosis from Referral: M17.11 (ICD-10-CM) - Primary osteoarthritis of right knee  Evaluation Date: 3/2/2023  Plan of Care Expiration: 23 or 24th Visit  Authorization Period Expiration: 3/30/23  Visit # / Visits authorized:   Remaining Visits Scheduled - 4  PTA Visit #:     Eval Visit FOTO-  2023 - 42.80  5th Visit FOTO   -  2023 - 48.5  10th Visit FOTO  -  (Date/Score/Turn Green)   D/C FOTO          -  (Date/Score/Turn Green)      Time In: 2:30 PM  Time Out: 3:15 PM  Billable Time: 45 minutes   Non-Billable Time: 00 minutes    Precautions: Standard and Fall  Insurance: Payor: [employer] / Plan: [employer] / Product Type: Medicare Advantage /     Subjective     Pt reports: Pt walked in the yard with her  without cane and had no LOB    She is compliant with home exercise program.  Response to previous treatment: tired    Pre-Treatment Pain Ratin/10  Post-Treatment Pain Ratin/10  Location: right knee  (right side of the knee)    Objective        Range of Motion/Strength:      Knee Left Right Pain/Dysfunction with Movement   AROM         Knee Flexion (140)  105 °   108 °  Pain    Knee Extension (0)  0 °   0 °  Pain        Kaylee received therapeutic exercises to develop strength, ROM, and flexibility for 25 minutes including:    ROM assessment  Warm-up    Supine    Quad Sets with towel right LE - 30 reps with 5 sec. Hold   Heel slides with strap - 5 minutes  SLR rightLE  - 3x5 reps (attempted but pt unable to perform)  Short Arc Quad 3x10 (blue bolster)  Long Arc Quad #2 3x10  Hip Adduction - 30 reps     Long sitting hip adduction  3x10    Seated    Marching in place 3x10 B sides GTB (no resistance today)  Hip abduction 3x10 B sides RTB  Hip adduction w/ball 2-3'' hold 3x10  Heel slides seated - 5' with and 3' without strap  Heel to toe 30x   Ankle pumps x 30  Hamstring stretch 3x30s  Gastroc stretch 3x30s    Hamstring curls RTB 3x10    Standing        *Bold exercise performed     Kaylee received the following manual therapy techniques: Soft tissue Mobilization were applied to the: R knee for 00 minutes, including:    Manual Therapy  (amplitude and time)     Knee flexion PROM  NP   STM to anterior aspect of knee NP   Patella Mobilization  NP   CRFM to lateral aspect of knee NP   Scar tissue massage NP         Kaylee participated in neuromuscular re-education activities to improve: Balance, Coordination, Kinesthetic, and Proprioception for 10 minutes. The following activities were included:  Romberg balance on airex  3x30s  Romberg balance eyes closed/head turns 2x30s each   B Semi-tandem stance balance 3x30s      Kaylee participated in dynamic functional therapeutic activities to improve functional performance for 10  minutes, including:    Activities performed   (duration/resistance)     Sit to stand 4x5 - Done   Lateral walks at table - 5' Done   8 inch steps 2x5 NP                 Kaylee received the following supervised modalities after being cleared for contradictions: IFC Electrical Stimulation:  Kaylee received IFC Electrical Stimulation for pain control applied to the R knee. Pt received stimulation at 10V at a carrier frequency of 4000Hz through 80-150Hz for 00 minutes. Kaylee tolderated treatment well without any adverse effects.  - NP today (pt stated she's felt her nerves being stimulated since last session and she did not like e.stim)      Home Exercises Provided and Patient Education Provided     Education provided:   - Continue with HEP    Written Home Exercises Provided: Patient instructed to cont prior HEP.  Exercises were reviewed  and Kaylee was able to demonstrate them prior to the end of the session.  Kaylee demonstrated good  understanding of the education provided.     See EMR under Patient Instructions for exercises provided prior visit.    Assessment     Pt tolerated session well. 5' lateral walk added to improve pt endurance. Pt required 3 standing rest breaks and was able to talk during exercise. All balance exercises performed without LOB or difficulty. Kaylee is pleased with her progress and will continue to benefit from skilled therapy.       Kaylee Is progressing well towards Her goals.     Pt prognosis is Good.     Pt will continue to benefit from skilled outpatient physical therapy to address the deficits listed in the problem list box on initial evaluation, provide pt/family education and to maximize pt's level of independence in the home and community environment.     Pt's spiritual, cultural and educational needs considered and pt agreeable to plan of care and goals.    Anticipated barriers to physical therapy: PMH    Goals:    Short Term Goals: 6 weeks  Goal   Status    Pt. to report decreased right knee pain  </= 5/10 at worst to increase tolerance for prolong standing   goal met     Pt. to demonstrate proper gait mechanics requiring minimum verbal cues from PT Goal met      Pt. to demonstrate an increase right knee AROM to 95 degrees to improve ease with stair negotiation. Goal met      Pt to be Independent with HEP to improve ROM and independence with ADL's Goal met            Long Term Goals: 12 weeks  Goal Status    Pt. to report decreased right knee pain  </= 2/10 at worst to increase tolerance for prolong standing   Goal met   Pt. to demonstrate proper gait mechanics requiring no verbal cues from PT  Goal met   Pt. to demonstrate an increase right knee AROM to >110 degrees to improve ease with stair negotiation.  Progressing 5/25/2023     Pt. to demonstrate increased MMT for right hip abductors to 4/5  to increase  stability with ambulation on even surfaces.  Progressing 5/25/2023   Pt. to demonstrate increased MMT for right quadriceps to 4/5 to increase ability to negotiate stairs  Progressing 5/25/2023     Pt. to demonstrate increased MMT for right4 hamstring  to 4/5 to increase tolerance to squatting Progressing 5/25/2023      Pt to be Independent with HEP to improve ROM and independence with ADL's Goal met       Plan     Continued with current POC      Kim Carver PTA,  5/25/2023

## 2023-05-26 ENCOUNTER — OFFICE VISIT (OUTPATIENT)
Dept: OPHTHALMOLOGY | Facility: CLINIC | Age: 78
End: 2023-05-26
Payer: MEDICARE

## 2023-05-26 DIAGNOSIS — I10 ESSENTIAL HYPERTENSION: Chronic | ICD-10-CM

## 2023-05-26 DIAGNOSIS — G47.33 OSA ON CPAP: ICD-10-CM

## 2023-05-26 DIAGNOSIS — H43.813 PVD (POSTERIOR VITREOUS DETACHMENT), BOTH EYES: ICD-10-CM

## 2023-05-26 DIAGNOSIS — E11.9 TYPE 2 DIABETES MELLITUS WITHOUT OPHTHALMIC MANIFESTATIONS: ICD-10-CM

## 2023-05-26 DIAGNOSIS — H25.13 NUCLEAR SCLEROTIC CATARACT OF BOTH EYES: Primary | ICD-10-CM

## 2023-05-26 PROCEDURE — 1159F MED LIST DOCD IN RCRD: CPT | Mod: CPTII,S$GLB,, | Performed by: OPHTHALMOLOGY

## 2023-05-26 PROCEDURE — 99203 PR OFFICE/OUTPT VISIT, NEW, LEVL III, 30-44 MIN: ICD-10-PCS | Mod: S$GLB,,, | Performed by: OPHTHALMOLOGY

## 2023-05-26 PROCEDURE — 99203 OFFICE O/P NEW LOW 30 MIN: CPT | Mod: S$GLB,,, | Performed by: OPHTHALMOLOGY

## 2023-05-26 PROCEDURE — 3288F PR FALLS RISK ASSESSMENT DOCUMENTED: ICD-10-PCS | Mod: CPTII,S$GLB,, | Performed by: OPHTHALMOLOGY

## 2023-05-26 PROCEDURE — 1160F RVW MEDS BY RX/DR IN RCRD: CPT | Mod: CPTII,S$GLB,, | Performed by: OPHTHALMOLOGY

## 2023-05-26 PROCEDURE — 2023F PR DILATED RETINAL EXAM W/O EVID OF RETINOPATHY: ICD-10-PCS | Mod: CPTII,S$GLB,, | Performed by: OPHTHALMOLOGY

## 2023-05-26 PROCEDURE — 1159F PR MEDICATION LIST DOCUMENTED IN MEDICAL RECORD: ICD-10-PCS | Mod: CPTII,S$GLB,, | Performed by: OPHTHALMOLOGY

## 2023-05-26 PROCEDURE — 2023F DILAT RTA XM W/O RTNOPTHY: CPT | Mod: CPTII,S$GLB,, | Performed by: OPHTHALMOLOGY

## 2023-05-26 PROCEDURE — 99999 PR PBB SHADOW E&M-EST. PATIENT-LVL IV: CPT | Mod: PBBFAC,,, | Performed by: OPHTHALMOLOGY

## 2023-05-26 PROCEDURE — 99999 PR PBB SHADOW E&M-EST. PATIENT-LVL IV: ICD-10-PCS | Mod: PBBFAC,,, | Performed by: OPHTHALMOLOGY

## 2023-05-26 PROCEDURE — 1126F PR PAIN SEVERITY QUANTIFIED, NO PAIN PRESENT: ICD-10-PCS | Mod: CPTII,S$GLB,, | Performed by: OPHTHALMOLOGY

## 2023-05-26 PROCEDURE — 1101F PT FALLS ASSESS-DOCD LE1/YR: CPT | Mod: CPTII,S$GLB,, | Performed by: OPHTHALMOLOGY

## 2023-05-26 PROCEDURE — 1101F PR PT FALLS ASSESS DOC 0-1 FALLS W/OUT INJ PAST YR: ICD-10-PCS | Mod: CPTII,S$GLB,, | Performed by: OPHTHALMOLOGY

## 2023-05-26 PROCEDURE — 1126F AMNT PAIN NOTED NONE PRSNT: CPT | Mod: CPTII,S$GLB,, | Performed by: OPHTHALMOLOGY

## 2023-05-26 PROCEDURE — 3288F FALL RISK ASSESSMENT DOCD: CPT | Mod: CPTII,S$GLB,, | Performed by: OPHTHALMOLOGY

## 2023-05-26 PROCEDURE — 99499 UNLISTED E&M SERVICE: CPT | Mod: S$GLB,,, | Performed by: OPHTHALMOLOGY

## 2023-05-26 PROCEDURE — 1160F PR REVIEW ALL MEDS BY PRESCRIBER/CLIN PHARMACIST DOCUMENTED: ICD-10-PCS | Mod: CPTII,S$GLB,, | Performed by: OPHTHALMOLOGY

## 2023-05-26 NOTE — PROGRESS NOTES
HPI    DLS: 5/09/2023 with Dr. López    Pt here for Cataract Eval per Dr. López;  Pt states no eye pain or discomfort.     Meds;  No GTTS    1. NS (nuclear sclerosis), bilateral   2. KAMRAN on CPAP   3. Dry eye syndrome, bilateral   4. Presbyopia     Last edited by Saniya Seymour on 5/26/2023  9:46 AM.            Assessment /Plan     For exam results, see Encounter Report.    Nuclear sclerotic cataract of both eyes    PVD (posterior vitreous detachment), both eyes    Type 2 diabetes mellitus without ophthalmic manifestations    KAMRAN on CPAP    Essential hypertension        This patient is the wife of a long standing glaucoma patient of mine   Want to continue with me for yearly checks       PVD (posterior vitreous detachment), bilateral  Retinal drusen of both eyes  Arcus senilis of both corneas  Type 2 diabetes mellitus without ophthalmic manifestations  Essential hypertension              No retinopathy, monitor yearly     NS (nuclear sclerosis), bilateral            borderline significant at this time    Consider cat eval next visit  KAMRAN on CPAP     Presbyopia              Rx specs                   RTC 1 year, sooner PRN- cataract check .. diabetic check

## 2023-05-29 ENCOUNTER — CLINICAL SUPPORT (OUTPATIENT)
Dept: REHABILITATION | Facility: HOSPITAL | Age: 78
End: 2023-05-29
Attending: ORTHOPAEDIC SURGERY
Payer: MEDICARE

## 2023-05-29 DIAGNOSIS — R26.89 GAIT, ANTALGIC: ICD-10-CM

## 2023-05-29 DIAGNOSIS — M25.661 DECREASED RANGE OF MOTION OF RIGHT KNEE: Primary | ICD-10-CM

## 2023-05-29 PROCEDURE — 97112 NEUROMUSCULAR REEDUCATION: CPT | Mod: PN

## 2023-05-29 PROCEDURE — 97140 MANUAL THERAPY 1/> REGIONS: CPT | Mod: PN

## 2023-05-29 PROCEDURE — 97110 THERAPEUTIC EXERCISES: CPT | Mod: PN

## 2023-05-29 NOTE — PROGRESS NOTES
Physical Therapy Daily Treatment Note     Name: Kaylee Romero  Clinic Number: [id number]    Therapy Diagnosis:        Encounter Diagnosis   Name Primary?    Primary osteoarthritis of right knee       Physician: Peterson Sharma MD    Visit Date: 2023     Physician Orders: PT Eval and Treat   Medical Diagnosis from Referral: M17.11 (ICD-10-CM) - Primary osteoarthritis of right knee  Evaluation Date: 3/2/2023  Plan of Care Expiration: 23 or 24th Visit  Authorization Period Expiration: 3/30/23  Visit # / Visits authorized:   Remaining Visits Scheduled - 3  PTA Visit #: 0/6    Eval Visit FOTO-  2023 - 42.80  5th Visit FOTO   -  2023 - 48.5  10th Visit FOTO  -  (Date/Score/Turn Green)   D/C FOTO          -  (Date/Score/Turn Green)      Time In: 2:35 PM  Time Out: 3:15 PM  Billable Time: 40 minutes   Non-Billable Time: 00 minutes    Precautions: Standard and Fall  Insurance: Payor: [employer] / Plan: [employer] 65 / Product Type: Medicare Advantage /     Subjective     Pt reports: was able to go up and down the 22 steps this weekend     She is compliant with home exercise program.  Response to previous treatment: tired    Pre-Treatment Pain Ratin/10  Post-Treatment Pain Ratin/10  Location: right knee  (right side of the knee)    Objective        Range of Motion/Strength:      Knee Left Right Pain/Dysfunction with Movement   AROM         Knee Flexion (140)  105 °   110 °  Pain    Knee Extension (0)  0 °   0 °  Pain      Right Quadriceps manual muscle test - -4/5    Right hamstring manual muscle test - -4/5    Kaylee received therapeutic exercises to develop strength, ROM, and flexibility for 25 minutes including:    ROM assessment  Warm-up    Supine    Quad Sets with towel right LE - 30 reps with 5 sec. Hold   Heel slides with strap - 5 minutes  SLR rightLE  - 3x5 reps (attempted but pt unable to perform)  Short Arc Quad 3x10 (blue bolster)  Long Arc  Quad #2 3x10  Hip Adduction - 30 reps     Long sitting hip adduction 3x10    Seated    Marching in place 3x10 B sides GTB (no resistance today)  Hip abduction 3x10 B sides RTB  Hip adduction w/ball 2-3'' hold 3x10  Heel slides seated - 5' with and 3' without strap  Heel to toe 30x   Ankle pumps x 30  Hamstring stretch 3x30s  Gastroc stretch 3x30s    Hamstring curls RTB 3x10    Standing    Hip abduction 3x10    *Bold exercise performed     Kaylee received the following manual therapy techniques: Soft tissue Mobilization were applied to the: R knee for 00 minutes, including:    Manual Therapy  (amplitude and time)     Knee flexion PROM  NP   STM to anterior aspect of knee NP   Patella Mobilization  NP   CRFM to lateral aspect of knee NP   Scar tissue massage NP         Kaylee participated in neuromuscular re-education activities to improve: Balance, Coordination, Kinesthetic, and Proprioception for 10 minutes. The following activities were included:  Romberg balance on airex  3x30s  Romberg balance eyes closed/head turns 2x30s each   B Semi-tandem stance balance 3x30s      Kaylee participated in dynamic functional therapeutic activities to improve functional performance for 10  minutes, including:    Activities performed   (duration/resistance)     Sit to stand 4x5 - Done   Lateral walks at table - 5' Done   8 inch steps 2x5 NP                 Kaylee received the following supervised modalities after being cleared for contradictions: IFC Electrical Stimulation:  Kaylee received IFC Electrical Stimulation for pain control applied to the R knee. Pt received stimulation at 10V at a carrier frequency of 4000Hz through 80-150Hz for 00 minutes. Kaylee tolderated treatment well without any adverse effects.  - NP today (pt stated she's felt her nerves being stimulated since last session and she did not like e.stim)      Home Exercises Provided and Patient Education Provided     Education provided:   - Continue with HEP    Written  Home Exercises Provided: Patient instructed to cont prior HEP.  Exercises were reviewed and Kaylee was able to demonstrate them prior to the end of the session.  Kaylee demonstrated good  understanding of the education provided.     See EMR under Patient Instructions for exercises provided prior visit.    Assessment     Patient currently presents to therapy with reports of continued fatigue. Physical Therapy displayed proper gait  to improve energy efficiency. Patient  has been able to improve Knee flexion range of motion to 110 degrees with soft tissue approximantion. Patient reports that she is satisfied with current range of motion and is able to functionally achieve her goal. Patient will be evaluated during next Physical Therapist visit for discharge.           Kaylee Is progressing well towards Her goals.     Pt prognosis is Good.     Pt will continue to benefit from skilled outpatient physical therapy to address the deficits listed in the problem list box on initial evaluation, provide pt/family education and to maximize pt's level of independence in the home and community environment.     Pt's spiritual, cultural and educational needs considered and pt agreeable to plan of care and goals.    Anticipated barriers to physical therapy: PMH    Goals:    Short Term Goals: 6 weeks  Goal   Status    Pt. to report decreased right knee pain  </= 5/10 at worst to increase tolerance for prolong standing   goal met     Pt. to demonstrate proper gait mechanics requiring minimum verbal cues from PT Goal met      Pt. to demonstrate an increase right knee AROM to 95 degrees to improve ease with stair negotiation. Goal met      Pt to be Independent with HEP to improve ROM and independence with ADL's Goal met            Long Term Goals: 12 weeks  Goal Status    Pt. to report decreased right knee pain  </= 2/10 at worst to increase tolerance for prolong standing   Goal met   Pt. to demonstrate proper gait mechanics  requiring no verbal cues from PT  Goal met   Pt. to demonstrate an increase right knee AROM to >110 degrees to improve ease with stair negotiation.  Goal met     Pt. to demonstrate increased MMT for right hip abductors to 4/5  to increase stability with ambulation on even surfaces.  Progressing 5/29/2023   Pt. to demonstrate increased MMT for right quadriceps to 4/5 to increase ability to negotiate stairs  Progressing 5/29/2023     Pt. to demonstrate increased MMT for right4 hamstring  to 4/5 to increase tolerance to squatting Progressing 5/29/2023      Pt to be Independent with HEP to improve ROM and independence with ADL's Goal met       Plan     Continued with current POC      Maxwell Brewer, PT,  5/29/2023

## 2023-05-31 ENCOUNTER — PATIENT MESSAGE (OUTPATIENT)
Dept: SLEEP MEDICINE | Facility: CLINIC | Age: 78
End: 2023-05-31
Payer: MEDICARE

## 2023-06-01 ENCOUNTER — DOCUMENTATION ONLY (OUTPATIENT)
Dept: REHABILITATION | Facility: HOSPITAL | Age: 78
End: 2023-06-01

## 2023-06-01 ENCOUNTER — CLINICAL SUPPORT (OUTPATIENT)
Dept: REHABILITATION | Facility: HOSPITAL | Age: 78
End: 2023-06-01
Attending: ORTHOPAEDIC SURGERY
Payer: MEDICARE

## 2023-06-01 DIAGNOSIS — M25.661 DECREASED RANGE OF MOTION OF RIGHT KNEE: Primary | ICD-10-CM

## 2023-06-01 DIAGNOSIS — R26.89 GAIT, ANTALGIC: ICD-10-CM

## 2023-06-01 PROCEDURE — 97530 THERAPEUTIC ACTIVITIES: CPT | Mod: PN,CQ

## 2023-06-01 PROCEDURE — 97110 THERAPEUTIC EXERCISES: CPT | Mod: PN,CQ

## 2023-06-01 PROCEDURE — 97112 NEUROMUSCULAR REEDUCATION: CPT | Mod: PN,CQ

## 2023-06-01 NOTE — PROGRESS NOTES
Physical Therapy Daily Treatment Note     Name: Kaylee Romero  Clinic Number: [id number]    Therapy Diagnosis:        Encounter Diagnosis   Name Primary?    Primary osteoarthritis of right knee       Physician: Peterson Sharma MD    Visit Date: 2023     Physician Orders: PT Eval and Treat   Medical Diagnosis from Referral: M17.11 (ICD-10-CM) - Primary osteoarthritis of right knee  Evaluation Date: 3/2/2023  Plan of Care Expiration: 23 or 24th Visit  Authorization Period Expiration: 3/30/23  Visit # / Visits authorized:   Remaining Visits Scheduled - 2  PTA Visit #:     Eval Visit FOTO-  2023 - 42.80  5th Visit FOTO   -  2023 - 48.5  10th Visit FOTO  -  (Date/Score/Turn Green)   D/C FOTO          -  (Date/Score/Turn Green)      Time In: 2:35 PM  Time Out: 3:15 PM  Billable Time: 40 minutes   Non-Billable Time: 00 minutes    Precautions: Standard and Fall  Insurance: Payor: [employer] / Plan: [employer] 65 / Product Type: Medicare Advantage /     Subjective     Pt reports: No pain today, knee is doing well.     She is compliant with home exercise program.  Response to previous treatment: tired    Pre-Treatment Pain Ratin/10  Post-Treatment Pain Ratin/10  Location: right knee  (right side of the knee)    Objective        Range of Motion/Strength:      Knee Left Right Pain/Dysfunction with Movement   AROM         Knee Flexion (140)  105 °   108 °  Pain    Knee Extension (0)  0 °   0 °  Pain      Right Quadriceps manual muscle test - -4/5    Right hamstring manual muscle test - -4/5    Kaylee received therapeutic exercises to develop strength, ROM, and flexibility for 25 minutes including:    ROM assessment  Warm-up    Supine    Quad Sets with towel right LE - 30 reps with 5 sec. Hold   Heel slides with strap - 5 minutes  SLR rightLE  - 3x5 reps (attempted but pt unable to perform)  Short Arc Quad 3x10 (blue bolster)  Long Arc Quad #2 3x10  Hip  Adduction - 30 reps     Long sitting hip adduction 3x10    Seated    Marching in place 3x10 B sides GTB (no resistance today)  Hip abduction 3x10 B sides RTB  Hip adduction w/ball 2-3'' hold 3x10  Heel slides seated - 5' with and 3' without strap  Heel to toe 30x   Ankle pumps x 30  Hamstring stretch 3x30s  Gastroc stretch 3x30s    Hamstring curls RTB 3x10    Standing    Hip abduction 3x10    *Bold exercise performed     Kaylee received the following manual therapy techniques: Soft tissue Mobilization were applied to the: R knee for 00 minutes, including:    Manual Therapy  (amplitude and time)     Knee flexion PROM  NP   STM to anterior aspect of knee NP   Patella Mobilization  NP   CRFM to lateral aspect of knee NP   Scar tissue massage NP         Kaylee participated in neuromuscular re-education activities to improve: Balance, Coordination, Kinesthetic, and Proprioception for 10 minutes. The following activities were included:  Romberg balance on airex  3x30s  Romberg balance eyes closed/head turns 2x30s each   B Semi-tandem stance balance 3x30s      Kaylee participated in dynamic functional therapeutic activities to improve functional performance for 10  minutes, including:    Activities performed   (duration/resistance)     Sit to stand 4x5 - Done   Lateral walks at table - 5' Done   8 inch steps 2x5 NP                 Kaylee received the following supervised modalities after being cleared for contradictions: IFC Electrical Stimulation:  Kaylee received IFC Electrical Stimulation for pain control applied to the R knee. Pt received stimulation at 10V at a carrier frequency of 4000Hz through 80-150Hz for 00 minutes. Kaylee tolderated treatment well without any adverse effects.  - NP today (pt stated she's felt her nerves being stimulated since last session and she did not like e.stim)      Home Exercises Provided and Patient Education Provided     Education provided:   - Continue with HEP    Written Home Exercises  Provided: Patient instructed to cont prior HEP.  Exercises were reviewed and Kaylee was able to demonstrate them prior to the end of the session.  Kaylee demonstrated good  understanding of the education provided.     See EMR under Patient Instructions for exercises provided prior visit.    Assessment     Pt tolerated session well. Pt requires fewer rest breaks than when starting therapy. Standing exercises still tiring but pt shows increased tolerance for ambulation. Knee flexion in sitting measured at 108 degrees with full knee extension. No difficulty noted with any balance stances. Pt is very pleased with her progress. She is able to walk in her garden again w/o AD and stated she climbed over 40 stairs on [place] Day. Follow up with doctor is tomorrow and final scheduled PT visit is next Monday.            Kaylee Is progressing well towards Her goals.     Pt prognosis is Good.     Pt will continue to benefit from skilled outpatient physical therapy to address the deficits listed in the problem list box on initial evaluation, provide pt/family education and to maximize pt's level of independence in the home and community environment.     Pt's spiritual, cultural and educational needs considered and pt agreeable to plan of care and goals.    Anticipated barriers to physical therapy: PMH    Goals:    Short Term Goals: 6 weeks  Goal   Status    Pt. to report decreased right knee pain  </= 5/10 at worst to increase tolerance for prolong standing   goal met     Pt. to demonstrate proper gait mechanics requiring minimum verbal cues from PT Goal met      Pt. to demonstrate an increase right knee AROM to 95 degrees to improve ease with stair negotiation. Goal met      Pt to be Independent with HEP to improve ROM and independence with ADL's Goal met            Long Term Goals: 12 weeks  Goal Status    Pt. to report decreased right knee pain  </= 2/10 at worst to increase tolerance for prolong standing   Goal met   Pt. to  demonstrate proper gait mechanics requiring no verbal cues from PT  Goal met   Pt. to demonstrate an increase right knee AROM to >110 degrees to improve ease with stair negotiation.  Goal met     Pt. to demonstrate increased MMT for right hip abductors to 4/5  to increase stability with ambulation on even surfaces.  Progressing 6/1/2023   Pt. to demonstrate increased MMT for right quadriceps to 4/5 to increase ability to negotiate stairs  Progressing 6/1/2023     Pt. to demonstrate increased MMT for right4 hamstring  to 4/5 to increase tolerance to squatting Progressing 6/1/2023      Pt to be Independent with HEP to improve ROM and independence with ADL's Goal met       Plan     Continued with current POC      Kim Carver, JOSE,  6/1/2023

## 2023-06-02 ENCOUNTER — PATIENT MESSAGE (OUTPATIENT)
Dept: ADMINISTRATIVE | Facility: OTHER | Age: 78
End: 2023-06-02
Payer: MEDICARE

## 2023-06-02 ENCOUNTER — OFFICE VISIT (OUTPATIENT)
Dept: ORTHOPEDICS | Facility: CLINIC | Age: 78
End: 2023-06-02
Payer: MEDICARE

## 2023-06-02 VITALS — WEIGHT: 233.94 LBS | HEIGHT: 62 IN | BODY MASS INDEX: 43.05 KG/M2

## 2023-06-02 DIAGNOSIS — Z96.651 STATUS POST RIGHT KNEE REPLACEMENT: Primary | ICD-10-CM

## 2023-06-02 PROCEDURE — 3288F PR FALLS RISK ASSESSMENT DOCUMENTED: ICD-10-PCS | Mod: CPTII,S$GLB,, | Performed by: ORTHOPAEDIC SURGERY

## 2023-06-02 PROCEDURE — 3288F FALL RISK ASSESSMENT DOCD: CPT | Mod: CPTII,S$GLB,, | Performed by: ORTHOPAEDIC SURGERY

## 2023-06-02 PROCEDURE — 1101F PR PT FALLS ASSESS DOC 0-1 FALLS W/OUT INJ PAST YR: ICD-10-PCS | Mod: CPTII,S$GLB,, | Performed by: ORTHOPAEDIC SURGERY

## 2023-06-02 PROCEDURE — 3072F PR LOW RISK FOR RETINOPATHY: ICD-10-PCS | Mod: CPTII,S$GLB,, | Performed by: ORTHOPAEDIC SURGERY

## 2023-06-02 PROCEDURE — 99024 POSTOP FOLLOW-UP VISIT: CPT | Mod: S$GLB,,, | Performed by: ORTHOPAEDIC SURGERY

## 2023-06-02 PROCEDURE — 99999 PR PBB SHADOW E&M-EST. PATIENT-LVL V: CPT | Mod: PBBFAC,,, | Performed by: ORTHOPAEDIC SURGERY

## 2023-06-02 PROCEDURE — 1126F PR PAIN SEVERITY QUANTIFIED, NO PAIN PRESENT: ICD-10-PCS | Mod: CPTII,S$GLB,, | Performed by: ORTHOPAEDIC SURGERY

## 2023-06-02 PROCEDURE — 1160F RVW MEDS BY RX/DR IN RCRD: CPT | Mod: CPTII,S$GLB,, | Performed by: ORTHOPAEDIC SURGERY

## 2023-06-02 PROCEDURE — 1126F AMNT PAIN NOTED NONE PRSNT: CPT | Mod: CPTII,S$GLB,, | Performed by: ORTHOPAEDIC SURGERY

## 2023-06-02 PROCEDURE — 1101F PT FALLS ASSESS-DOCD LE1/YR: CPT | Mod: CPTII,S$GLB,, | Performed by: ORTHOPAEDIC SURGERY

## 2023-06-02 PROCEDURE — 1159F PR MEDICATION LIST DOCUMENTED IN MEDICAL RECORD: ICD-10-PCS | Mod: CPTII,S$GLB,, | Performed by: ORTHOPAEDIC SURGERY

## 2023-06-02 PROCEDURE — 3072F LOW RISK FOR RETINOPATHY: CPT | Mod: CPTII,S$GLB,, | Performed by: ORTHOPAEDIC SURGERY

## 2023-06-02 PROCEDURE — 1160F PR REVIEW ALL MEDS BY PRESCRIBER/CLIN PHARMACIST DOCUMENTED: ICD-10-PCS | Mod: CPTII,S$GLB,, | Performed by: ORTHOPAEDIC SURGERY

## 2023-06-02 PROCEDURE — 99999 PR PBB SHADOW E&M-EST. PATIENT-LVL V: ICD-10-PCS | Mod: PBBFAC,,, | Performed by: ORTHOPAEDIC SURGERY

## 2023-06-02 PROCEDURE — 99024 PR POST-OP FOLLOW-UP VISIT: ICD-10-PCS | Mod: S$GLB,,, | Performed by: ORTHOPAEDIC SURGERY

## 2023-06-02 PROCEDURE — 1159F MED LIST DOCD IN RCRD: CPT | Mod: CPTII,S$GLB,, | Performed by: ORTHOPAEDIC SURGERY

## 2023-06-05 ENCOUNTER — CLINICAL SUPPORT (OUTPATIENT)
Dept: REHABILITATION | Facility: HOSPITAL | Age: 78
End: 2023-06-05
Attending: ORTHOPAEDIC SURGERY
Payer: MEDICARE

## 2023-06-05 DIAGNOSIS — R26.89 GAIT, ANTALGIC: ICD-10-CM

## 2023-06-05 DIAGNOSIS — M25.661 DECREASED RANGE OF MOTION OF RIGHT KNEE: Primary | ICD-10-CM

## 2023-06-05 PROCEDURE — 97110 THERAPEUTIC EXERCISES: CPT | Mod: PN

## 2023-06-05 NOTE — PROGRESS NOTES
"  Outpatient Therapy Discharge Summary     Name: Kaylee Romero  Clinic Number: [id number]    Therapy Diagnosis:        Encounter Diagnosis   Name Primary?    Primary osteoarthritis of right knee       Physician: Peterson Sharma MD    Visit Date: 2023     Physician Orders: PT Eval and Treat   Medical Diagnosis from Referral: M17.11 (ICD-10-CM) - Primary osteoarthritis of right knee  Evaluation Date: 3/2/2023  Plan of Care Expiration: 23 or 24th Visit  Authorization Period Expiration: 3/30/23  Visit # / Visits authorized:   Remaining Visits Scheduled - 2  PTA Visit #:     Eval Visit FOTO-  2023 - 42.80  5th Visit FOTO   -  2023 - 48.5  D/C FOTO          -  (2023) - 47    Time In: 2:37 PM (arrived late)  Time Out: 3:15 PM  Billable Time: 38 minutes (3TE)  Non-Billable Time: 00 minutes    Total Visits Received: 20 + eval    Precautions: Standard and Fall  Insurance: Payor: [employer] / Plan: [employer] 65 / Product Type: Medicare Advantage /     Subjective     Pt reports: no pain, and has much more "confidence" with ambulation.    She is compliant with home exercise program.  Response to previous treatment: tired    Pre-Treatment Pain Ratin/10  Post-Treatment Pain Ratin/10  Location: right knee  (right side of the knee)    Objective      Range of Motion/Strength:      Knee Left Right Pain/Dysfunction with Movement   AROM         Knee Flexion (140)  110 °   105 °      Knee Extension (0)  0 °   0 °              RLE 5/5 4+/5 4/5 4-/5 3+/5 3/5 3-/5 2+/5 2/5 2-/5 1/5 0 NT   Hip Flexion    x                        Hip Extension x                           Hip Abduction    x                        Hip Adduction    x                        Hip Internal Rotation    x                        Hip External Rotation    x                        Knee Flexion  x                          Knee Extension  x                          Ankle Dorsiflexion     x         "               Ankle Plantarflexion     x                          LLE 5/5 4+/5 4/5 4-/5 3+/5 3/5 3-/5 2+/5 2/5 2-/5 1/5 0 NT   Hip Flexion    x                         Hip Extension x                            Hip Abduction    x                         Hip Adduction    x                         Hip Internal Rotation    x                         Hip External Rotation    x                         Knee Flexion x                            Knee Extension x                            Ankle Dorsiflexion      x                       Ankle Plantarflexion      x                             Other:   Sit to Stand - 11 Reps. Completed in 30 sec.  TUG -  <14 Sec. To complete 10ft. (> 14sec. Considered a fall risk)        Kaylee received therapeutic exercises to develop strength, ROM, and flexibility for 38 minutes including:    Discharge assessment     Warm-up    Supine    Quad Sets with towel right LE - 30 reps with 5 sec. Hold   Heel slides with strap - 5 minutes  SLR rightLE  - 3x5 reps (attempted but pt unable to perform)  Short Arc Quad 3x10 (blue bolster)  Long Arc Quad #2 3x10  Hip Adduction - 30 reps     Long sitting hip adduction 3x10    Seated    Marching in place 3x10 B sides GTB (no resistance today)  Hip abduction 3x10 B sides RTB  Hip adduction w/ball 2-3'' hold 3x10  Heel slides seated - 5' with and 3' without strap  Heel raises 30x   Ankle pumps x 30  Hamstring stretch 3x30s  Gastroc stretch 3x30s    Hamstring curls RTB 3x10    Standing    Hip abduction 3x10    *Bold exercise performed     Kaylee received the following manual therapy techniques: Soft tissue Mobilization were applied to the: R knee for 00 minutes, including:    Manual Therapy  (amplitude and time)     Knee flexion PROM  NP   STM to anterior aspect of knee NP   Patella Mobilization  NP   CRFM to lateral aspect of knee NP   Scar tissue massage NP         Kaylee participated in neuromuscular re-education activities to improve: Balance, Coordination,  Kinesthetic, and Proprioception for 00 minutes. The following activities were included:  Romberg balance on airex  3x30s  Romberg balance eyes closed/head turns 2x30s each   B Semi-tandem stance balance 3x30s      Kaylee participated in dynamic functional therapeutic activities to improve functional performance for 00  minutes, including:    Activities performed   (duration/resistance)     Sit to stand 4x5 - NP   Lateral walks at table - 5' NP   8 inch steps 2x5 NP                 Kaylee received the following supervised modalities after being cleared for contradictions: IFC Electrical Stimulation:  Kaylee received IFC Electrical Stimulation for pain control applied to the R knee. Pt received stimulation at 10V at a carrier frequency of 4000Hz through 80-150Hz for 00 minutes. Kaylee tolderated treatment well without any adverse effects.  - NP today (pt stated she's felt her nerves being stimulated since last session and she did not like e.stim)      Home Exercises Provided and Patient Education Provided     Education provided:   - Continue with HEP    Written Home Exercises Provided: Patient instructed to perform additional HEP.  Exercises were reviewed and Kaylee was able to demonstrate them prior to the end of the session.  Kaylee demonstrated good  understanding of the education provided.     See EMR under Patient Instructions for exercises provided prior visit.    Assessment     Pt tolerated treatment session and discharge assessment well. No pain was noted at beginning of treatment session and patient notes overall functional improvement in right knee. Patient requested discharge and was reassessed to determine goal status. Patient demonstrated improvement in bilateral knee AROM and strength of bilateral lower extremities. Patient complete all short term and long term goals and demonstrates independence with all home exercises. Education was provided on continuation of functional strengthening to prevent future need  for medical management.     Discharge reason: Patient has met all of his/her goals and Patient requested discharge    Goals:    Short Term Goals: 6 weeks  Goal   Status    Pt. to report decreased right knee pain  </= 5/10 at worst to increase tolerance for prolong standing   goal met     Pt. to demonstrate proper gait mechanics requiring minimum verbal cues from PT Goal met      Pt. to demonstrate an increase right knee AROM to 95 degrees to improve ease with stair negotiation. Goal met      Pt to be Independent with HEP to improve ROM and independence with ADL's Goal met            Long Term Goals: 12 weeks  Goal Status    Pt. to report decreased right knee pain  </= 2/10 at worst to increase tolerance for prolong standing   Goal met   Pt. to demonstrate proper gait mechanics requiring no verbal cues from PT  Goal met   Pt. to demonstrate an increase right knee AROM to >110 degrees to improve ease with stair negotiation.  Goal met     Pt. to demonstrate increased MMT for right hip abductors to 4/5  to increase stability with ambulation on even surfaces.  Goal met 6/5/2023   Pt. to demonstrate increased MMT for right quadriceps to 4/5 to increase ability to negotiate stairs  Goal met 6/5/2023     Pt. to demonstrate increased MMT for right4 hamstring  to 4/5 to increase tolerance to squatting Goal met 6/5/2023      Pt to be Independent with HEP to improve ROM and independence with ADL's Goal met       Plan     This patient is discharged from Physical Therapy      Bryan Brown, PT, DPT  6/5/2023

## 2023-06-07 ENCOUNTER — OFFICE VISIT (OUTPATIENT)
Dept: PODIATRY | Facility: CLINIC | Age: 78
End: 2023-06-07
Payer: MEDICARE

## 2023-06-07 VITALS
WEIGHT: 233 LBS | BODY MASS INDEX: 42.88 KG/M2 | DIASTOLIC BLOOD PRESSURE: 74 MMHG | SYSTOLIC BLOOD PRESSURE: 117 MMHG | HEART RATE: 96 BPM | HEIGHT: 62 IN

## 2023-06-07 DIAGNOSIS — B35.1 DERMATOPHYTOSIS OF NAIL: ICD-10-CM

## 2023-06-07 DIAGNOSIS — L84 CORN OR CALLUS: ICD-10-CM

## 2023-06-07 DIAGNOSIS — E11.49 TYPE II DIABETES MELLITUS WITH NEUROLOGICAL MANIFESTATIONS: Primary | ICD-10-CM

## 2023-06-07 PROCEDURE — 99999 PR PBB SHADOW E&M-EST. PATIENT-LVL V: CPT | Mod: PBBFAC,,, | Performed by: PODIATRIST

## 2023-06-07 PROCEDURE — 11056 PARNG/CUTG B9 HYPRKR LES 2-4: CPT | Mod: Q9,59,S$GLB, | Performed by: PODIATRIST

## 2023-06-07 PROCEDURE — 99499 NO LOS: ICD-10-PCS | Mod: S$GLB,,, | Performed by: PODIATRIST

## 2023-06-07 PROCEDURE — 11056 ROUTINE FOOT CARE: ICD-10-PCS | Mod: Q9,59,S$GLB, | Performed by: PODIATRIST

## 2023-06-07 PROCEDURE — 11721 ROUTINE FOOT CARE: ICD-10-PCS | Mod: Q9,S$GLB,, | Performed by: PODIATRIST

## 2023-06-07 PROCEDURE — 99999 PR PBB SHADOW E&M-EST. PATIENT-LVL V: ICD-10-PCS | Mod: PBBFAC,,, | Performed by: PODIATRIST

## 2023-06-07 PROCEDURE — 99499 UNLISTED E&M SERVICE: CPT | Mod: S$GLB,,, | Performed by: PODIATRIST

## 2023-06-07 PROCEDURE — 11721 DEBRIDE NAIL 6 OR MORE: CPT | Mod: Q9,S$GLB,, | Performed by: PODIATRIST

## 2023-06-07 NOTE — PROGRESS NOTES
"  Chief Concern: Diabetic Foot Exam (Foot Exam/PCP Liz Roberts MD  02/14/23)      HPI:  Kaylee Romero is a 78 y.o. female presents for foot exam and care.       PCP:  Liz Roberts MD, Diabetic Foot Exam (Foot Exam/PCP Liz Roberts MD  02/14/23)             Patient Active Problem List   Diagnosis    Primary osteoarthritis of right knee    Essential hypertension    Dyslipidemia, goal LDL below 70    Morbid obesity with body mass index (BMI) of 40.0 or higher    KAMRAN on CPAP    History of total knee arthroplasty, left    Physical deconditioning    Osteoporosis without current pathological fracture    History of TIA (transient ischemic attack)    Age-related osteoporosis without current pathological fracture    Vitamin D deficiency, unspecified    Type 2 diabetes mellitus with stage 3 chronic kidney disease, without long-term current use of insulin    History of MI (myocardial infarction)    Coronary 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 pectoris    S/P CABG (coronary artery bypass graft)    Shoulder pain    Physical debility    GRANADO (dyspnea on exertion)    S/P drug eluting coronary stent placement    Abnormal stress test    Aortic atherosclerosis    Chronic heart failure with preserved ejection fraction    Decreased range of motion of right knee    Gait, antalgic    Status post right knee replacement           Current Outpatient Medications on File Prior to Visit   Medication Sig Dispense Refill    alendronate (FOSAMAX) 70 MG tablet TAKE 1 TABLET(70 MG) BY MOUTH EVERY 7 DAYS 12 tablet 1    amLODIPine (NORVASC) 10 MG tablet TAKE 1 TABLET(10 MG) BY MOUTH EVERY DAY 90 tablet 2    atorvastatin (LIPITOR) 80 MG tablet TAKE 1 TABLET(80 MG) BY MOUTH EVERY DAY 90 tablet 3    BD BOO 2ND GEN PEN NEEDLE 32 gauge x 5/32" Ndle To injection daily 100 each 8    blood sugar diagnostic Strp 1 strip by Misc.(Non-Drug; Combo Route) route once daily. " 100 strip 3    carvediloL (COREG) 25 MG tablet TAKE 1 TABLET(25 MG) BY MOUTH TWICE DAILY WITH MEALS 180 tablet 2    clopidogreL (PLAVIX) 75 mg tablet TAKE 1 TABLET(75 MG) BY MOUTH EVERY DAY 90 tablet 3    COD LIVER OIL ORAL TAKE 2 CAPSULES BY MOUTH EVERY MORNING AND 1 CAPSULE EVERY EVENING      diclofenac sodium (VOLTAREN) 1 % Gel Apply 2 g topically 4 (four) times daily as needed. To painful area on the feet. (Patient taking differently: Apply 2 g topically 4 (four) times daily as needed. To painful area on the feet and knee) 500 g 5    diphenoxylate-atropine 2.5-0.025 mg (LOMOTIL) 2.5-0.025 mg per tablet TAKE 1 TABLET BY MOUTH FOUR TIMES DAILY AS NEEDED 30 tablet 3    dulaglutide (TRULICITY) 4.5 mg/0.5 mL pen injector Inject 4.5 mg into the skin every 7 days. (Patient taking differently: Inject 4.5 mg into the skin every Sunday.) 2 mL 3    ferrous sulfate (FEROSUL) 325 mg (65 mg iron) Tab tablet TAKE 1 TABLET BY MOUTH DAILY 90 tablet 0    FLUZONE HIGHDOSE QUAD 22-23  mcg/0.7 mL Syrg       furosemide (LASIX) 20 MG tablet TAKE 1 TABLET BY MOUTH ON SATURDAY, SUNDAY, MONDAY, WEDNESDAY AND FRIDAY 30 tablet 3    glipiZIDE (GLUCOTROL) 5 MG tablet TAKE 2 TABLETS BY MOUTH WITH DINNER OR EVENING MEAL 120 tablet 3    glucagon (GVOKE HYPOPEN 2-PACK) 1 mg/0.2 mL AtIn Inject 1 Package into the skin as needed. 2 Syringe 0    insulin glargine,hum.rec.anlog (BASAGLAR KWIKPEN U-100 INSULIN SUBQ) Inject 22 Units into the skin every evening.      irbesartan (AVAPRO) 300 MG tablet TAKE 1 TABLET(300 MG) BY MOUTH EVERY EVENING 90 tablet 2    isosorbide mononitrate (IMDUR) 30 MG 24 hr tablet Take 2 tablets (60 mg total) by mouth once daily. 180 tablet 3    lancets Misc 1 lancet by Misc.(Non-Drug; Combo Route) route once daily. 100 each 3    multivitamin (THERAGRAN) per tablet Take 1 tablet by mouth once daily.      nitroGLYCERIN (NITROSTAT) 0.4 MG SL tablet Place 1 tablet (0.4 mg total) under the tongue every 5  "(five) minutes as needed for Chest pain. 25 tablet 6    PFIZER COVID BIVAL,12Y UP,,PF, 30 mcg/0.3 mL injection       SHINGRIX, PF, 50 mcg/0.5 mL injection       vitamin D (VITAMIN D3) 1000 units Tab Take 1,000 Units by mouth twice a week. Monday AND THURSDAYS ONLY      acetaminophen (TYLENOL) 500 MG tablet Take 500 mg by mouth 2 (two) times daily as needed for Pain.      acetaminophen (TYLENOL) 500 MG tablet Take 1 tablet (500 mg total) by mouth every 6 (six) hours as needed for Pain. (Patient not taking: Reported on 6/7/2023) 20 tablet 0    ammonium lactate (LAC-HYDRIN) 12 % lotion Apply topically as needed for Dry Skin. On the feet and toenails. (Patient not taking: Reported on 6/7/2023) 225 g 6    aspirin (ECOTRIN) 81 MG EC tablet Take 1 tablet (81 mg total) by mouth 2 (two) times a day. 60 tablet 0    cefadroxil (DURICEF) 500 MG Cap Take 1 capsule (500 mg total) by mouth every 12 (twelve) hours. 20 capsule 0    celecoxib (CELEBREX) 200 MG capsule Take 1 capsule (200 mg total) by mouth once daily. 15 capsule 0    esomeprazole (NEXIUM) 20 MG capsule Take 1 capsule (20 mg total) by mouth before breakfast. for 20 days 20 capsule 0    oxyCODONE (ROXICODONE) 5 MG immediate release tablet Take 1 tablet (5 mg total) by mouth every 4 (four) hours as needed for Pain. 20 tablet 0     Current Facility-Administered Medications on File Prior to Visit   Medication Dose Route Frequency Provider Last Rate Last Admin    hyaluronate sodium, stabilized (MONOVISC) Syrg   Intra-articular 1 time in Clinic/HOD Peterson Sharma MD             Review of patient's allergies indicates:   Allergen Reactions    Keflex [cephalexin] Other (See Comments)     Turns orange    Bactrim [sulfamethoxazole-trimethoprim]      Generic version  Sulfa makes her sick               Objective:        Vitals:    06/07/23 1418   BP: 117/74   Pulse: 96   Weight: 105.7 kg (233 lb)   Height: 5' 2" (1.575 m)         Physical Exam  Constitutional:  "      Appearance: She is well-developed.   Cardiovascular:      Comments: DP and PT pulses 2/4 loreta.   Edema noted loreta.   Capillary refill time < 3 seconds to digits 1-5, loreta.   Absent hair growth      Musculoskeletal:         General: Deformity present. No tenderness. Normal range of motion.      Comments: HAV noted to loreta 1st MPJ. 5/5 strength to all LE muscle groups. No POP noted     Skin:     Capillary Refill: Capillary refill takes 2 to 3 seconds.      Findings: No erythema.      Comments: Toenails x 10 are elongated by 1-3mm, thickened by 1-3mm and mycotic with subungual debris.  Flaky skin on the soles.  No vesicles or redness.      Neurological:      Mental Status: She is alert and oriented to person, place, and time.      Comments: +paresthesias (Abnormal spontaneous sensations in feet)                 Assessment:       Problem List Items Addressed This Visit    None  Visit Diagnoses       Type II diabetes mellitus with neurological manifestations    -  Primary    Dermatophytosis of nail        Corn or callus              Plan:         I counseled the patient on the patient's conditions, their implications and medical management.  Shoe inspection.     Maintain proper foot hygiene.   Continue wearing proper shoe gear, daily foot inspections, never walking without protective shoe gear, never putting sharp instruments to feet.    Routine Foot Care    Date/Time: 6/7/2023 2:15 PM  Performed by: Donna Gillis DPM  Authorized by: Donna Gillis DPM     Consent Done?:  Yes (Verbal)  Hyperkeratotic Skin Lesions?: Yes    Number of trimmed lesions:  2  Location(s):  Left 1st Toe and Right 1st Toe    Nail Care Type:  Debride  Location(s): All  (Left 1st Toe, Left 3rd Toe, Left 2nd Toe, Left 4th Toe, Left 5th Toe, Right 1st Toe, Right 2nd Toe, Right 3rd Toe, Right 4th Toe and Right 5th Toe)  Patient tolerance:  Patient tolerated the procedure well with no immediate complications     With patient's permission, the toenails  mentioned above were reduced and debrided using a nail nipper, removing offending nail and debris.   Utilizing a #15 scalpel, I trimmed the corns and calluses at the above mentioned location.      The patient will continue to monitor the areas daily, inspect the feet, wear protective shoe gear when ambulatory, and moisturizer to maintain skin integrity.                   Donna Gillis DPM

## 2023-06-07 NOTE — PATIENT INSTRUCTIONS
How to Check Your Feet    Below are tips to help you look for foot problems. Try to check your feet at the same time each day, such as when you get out of bed in the morning.    Check the top of each foot. The tops of toes, back of the heel, and outer edge of the foot can get a lot of rubbing from poor-fitting shoes.    Check the bottom of each foot. Daily wear and tear often leads to problems at pressure spots.    Check the toes and nails. Fungal infections often occur between toes. Toenail problems can also be a sign of fungal infections or lead to breaks in the skin.    Check your shoes, too. Loose objects inside a shoe can injure the foot. Use your hand to feel inside your shoes for things like gissell, loose stitching, or rough areas that could irritate your skin.        Diabetic Foot Care    Diabetes can lead to a number of different foot complications. Fortunately, most of these complications can be prevented with a little extra foot care. If diabetes is not well controlled, the high blood sugar can cause damage to blood vessels and result in poor circulation to the foot. When the skin does not get enough blood flow, it becomes prone to pressure sores and ulcers, which heal slowly.  High blood sugar can also damage nerves, interfering with the ability to feel pain and pressure. When you cant feel your foot normally, it is easy to injure your skin, bones and joints without knowing it. For these reasons diabetes increases the risk of fungal infections, bunions and ulcers. Deep ulcers can lead to bone infection. Gangrene is the most serious foot complication of diabetes. It usually occurs on the tips of the toes as blacked areas of skin. The black area is dead tissue. In severe cases, gangrene spreads to involve the entire toe, other toes and the entire foot. Foot or toe amputation may be required. Good foot care and blood sugar control can prevent this.    Home Care  Wear comfortable, proper fitting  shoes.  Wash your feet daily with warm water and mild soap.  After drying, apply a moisturizing cream or lotion.  Check your feet daily for skin breaks, blisters, swelling, or redness. Look between your toes also.  Wear cotton socks and change them every day.  Trim toe nails carefully and do not cut your cuticles.  Strive to keep your blood sugar under control with a combination of medicines, diet and activity.  If you smoke and have diabetes, it is very important that you stop. Smoking reduces blood flow to your foot.  Avoid activities that increase your risk of foot injury:  Do not walk barefoot.  Do not use heating pads or hot water bottles on your feet.  Do not put your foot in a hot tub without first checking the temperature with your hand.  10) Schedule yearly foot exams.    Follow Up  with your doctor or as advised by our staff. Report any cut, puncture, scrape, other injury, blister, ingrown toenail or ulcer on your foot.    Get Prompt Medical Attention  if any of the following occur:  -- Open ulcer with pus draining from the wound  -- Increasing foot or leg pain  -- New areas of redness or swelling or tender areas of the foot    © 9694-9961 [employer]. [address] [place] [address], [place] [address]. All rights reserved. This information is not intended as a substitute for professional medical care. Always follow your healthcare professional's instructions.

## 2023-06-09 ENCOUNTER — HOSPITAL ENCOUNTER (OUTPATIENT)
Dept: RADIOLOGY | Facility: CLINIC | Age: 78
Discharge: HOME | End: 2023-06-09
Attending: NURSE PRACTITIONER
Payer: MEDICARE

## 2023-06-09 DIAGNOSIS — M81.0 OSTEOPOROSIS WITHOUT CURRENT PATHOLOGICAL FRACTURE, UNSPECIFIED OSTEOPOROSIS TYPE: Chronic | ICD-10-CM

## 2023-06-09 PROCEDURE — 77080 DXA BONE DENSITY AXIAL: CPT | Mod: TC

## 2023-06-09 PROCEDURE — 77080 DXA BONE DENSITY AXIAL: CPT | Mod: 26,,, | Performed by: INTERNAL MEDICINE

## 2023-06-09 PROCEDURE — 77080 DXA BONE DENSITY AXIAL SKELETON 1 OR MORE SITES: ICD-10-PCS | Mod: 26,,, | Performed by: INTERNAL MEDICINE

## 2023-06-11 NOTE — PROGRESS NOTES
Subjective:      Patient ID: Kaylee Romero is a 78 y.o. female.    Chief Complaint:   Post-op Evaluation of the Right Knee    HPI  She returns about 3 months out from right TKA.  She has no significant knee pain, and she is overjoyed with her result and progress thus far.  No new symptoms to report.      Objective:        Ortho/SPM Exam    On exam, the scar is well healed without redness or tenderness.  There is no effusion.  Active range of motion is 0-120° without crepitus.  No instability to varus/valgus stress in extension or flexion.  No excessive sagittal plane instability.  Calves soft, nontender.  Distal neurovascular intact.        IMAGING:  None today  Assessment:   Progressing well status post right TKA      Plan:   Anniversary follow-up with x-rays    The patient's pathophysiology was explained in detail with reference to x-rays, models, other visual aids as appropriate.  Treatment options were discussed in detail.  Questions were invited and answered to the patient's satisfaction.      Peterson Sharma MD  Orthopedic Surgery

## 2023-06-15 ENCOUNTER — PATIENT MESSAGE (OUTPATIENT)
Dept: ENDOCRINOLOGY | Facility: CLINIC | Age: 78
End: 2023-06-15
Payer: MEDICARE

## 2023-06-22 RX ORDER — FUROSEMIDE 20 MG/1
TABLET ORAL
Qty: 30 TABLET | Refills: 3 | Status: SHIPPED | OUTPATIENT
Start: 2023-06-22 | End: 2023-12-08

## 2023-06-23 ENCOUNTER — TELEPHONE (OUTPATIENT)
Dept: PRIMARY CARE CLINIC | Facility: CLINIC | Age: 78
End: 2023-06-23
Payer: MEDICARE

## 2023-06-23 DIAGNOSIS — Z12.31 ENCOUNTER FOR SCREENING MAMMOGRAM FOR BREAST CANCER: Primary | ICD-10-CM

## 2023-06-23 NOTE — TELEPHONE ENCOUNTER
----- Message from Janette Sadler sent at 6/22/2023  4:30 PM CDT -----  Contact: [phone]  Pt is needing an order for a mammogram to be put in and scheduled. Please call.            Thank you

## 2023-07-07 RX ORDER — ISOSORBIDE MONONITRATE 30 MG/1
TABLET, EXTENDED RELEASE ORAL
Qty: 180 TABLET | Refills: 3 | Status: SHIPPED | OUTPATIENT
Start: 2023-07-07

## 2023-07-18 ENCOUNTER — LAB VISIT (OUTPATIENT)
Dept: LAB | Facility: HOSPITAL | Age: 78
End: 2023-07-18
Attending: NURSE PRACTITIONER
Payer: MEDICARE

## 2023-07-18 DIAGNOSIS — N18.31 TYPE 2 DIABETES MELLITUS WITH STAGE 3A CHRONIC KIDNEY DISEASE, WITHOUT LONG-TERM CURRENT USE OF INSULIN: Chronic | ICD-10-CM

## 2023-07-18 DIAGNOSIS — E11.22 TYPE 2 DIABETES MELLITUS WITH STAGE 3A CHRONIC KIDNEY DISEASE, WITHOUT LONG-TERM CURRENT USE OF INSULIN: Chronic | ICD-10-CM

## 2023-07-18 LAB
ALBUMIN SERPL BCP-MCNC: 3.6 G/DL (ref 3.5–5.2)
ALP SERPL-CCNC: 70 U/L (ref 55–135)
ALT SERPL W/O P-5'-P-CCNC: 14 U/L (ref 10–44)
ANION GAP SERPL CALC-SCNC: 12 MMOL/L (ref 8–16)
AST SERPL-CCNC: 18 U/L (ref 10–40)
BILIRUB SERPL-MCNC: 0.6 MG/DL (ref 0.1–1)
BUN SERPL-MCNC: 27 MG/DL (ref 8–23)
CALCIUM SERPL-MCNC: 9.7 MG/DL (ref 8.7–10.5)
CHLORIDE SERPL-SCNC: 100 MMOL/L (ref 95–110)
CO2 SERPL-SCNC: 26 MMOL/L (ref 23–29)
CREAT SERPL-MCNC: 1.3 MG/DL (ref 0.5–1.4)
EST. GFR  (NO RACE VARIABLE): 42.6 ML/MIN/1.73 M^2
ESTIMATED AVG GLUCOSE: 134 MG/DL (ref 68–131)
GLUCOSE SERPL-MCNC: 66 MG/DL (ref 70–110)
HBA1C MFR BLD: 6.3 % (ref 4–5.6)
POTASSIUM SERPL-SCNC: 4.3 MMOL/L (ref 3.5–5.1)
PROT SERPL-MCNC: 8.1 G/DL (ref 6–8.4)
SODIUM SERPL-SCNC: 138 MMOL/L (ref 136–145)
TSH SERPL DL<=0.005 MIU/L-ACNC: 3.4 UIU/ML (ref 0.4–4)

## 2023-07-18 PROCEDURE — 83036 HEMOGLOBIN GLYCOSYLATED A1C: CPT | Performed by: NURSE PRACTITIONER

## 2023-07-18 PROCEDURE — 80053 COMPREHEN METABOLIC PANEL: CPT | Performed by: NURSE PRACTITIONER

## 2023-07-18 PROCEDURE — 36415 COLL VENOUS BLD VENIPUNCTURE: CPT | Mod: PN | Performed by: NURSE PRACTITIONER

## 2023-07-18 PROCEDURE — 84443 ASSAY THYROID STIM HORMONE: CPT | Performed by: NURSE PRACTITIONER

## 2023-07-19 ENCOUNTER — PATIENT MESSAGE (OUTPATIENT)
Dept: ENDOCRINOLOGY | Facility: CLINIC | Age: 78
End: 2023-07-19
Payer: MEDICARE

## 2023-07-21 ENCOUNTER — PATIENT MESSAGE (OUTPATIENT)
Dept: PRIMARY CARE CLINIC | Facility: CLINIC | Age: 78
End: 2023-07-21
Payer: MEDICARE

## 2023-07-21 DIAGNOSIS — I25.118 CORONARY ARTERY DISEASE OF NATIVE ARTERY OF NATIVE HEART WITH STABLE ANGINA PECTORIS: ICD-10-CM

## 2023-07-21 NOTE — PROGRESS NOTES
Patient ID: Kaylee Romero is a 78 y.o. female    Chief Complaint:   No chief complaint on file.    HPI: Kaylee Romero with type 2 diabetes complicated by CAD s/p CABG, TIAs, hypertension, dyslipidemia, CKD Stage 3, obesity, osteoporosis, and KAMRAN presents for follow up of Type 2 diabetes and osteoporosis. Previous patient of mine. Last visit in March 2023    She had right knee arthroscope in March 2023.     Was seen in the hospital by KAVON Pritchard NP in 9/2019 because of a Surgical Procedure and Date: CABG 8/12/19.  He started her on levemir daily.     At her Sept 2020 she had a steroid injection and gave extra long acting insulin. Interim visit for lower blood sugars in Sept 2020 (BGs in 40-60's) and we stopped her basaglar as she gave up sugar.     At her previous visits, she would stop and restart insulin based on what she was eating -sweets daily.     CGMS in Jan 2022  Average glucose: 158   Above 180 mg/dL: 30.9% (Above 250 mg/dL: 4.0%)   Within  mg/dL: 67.5%   Below 70 mg/dL: 1.7%  (Below 54 mg/dL: <1%)    Continue   Trulicity 4.5 mg weekly   Glipizide 10 mg twice daily with meals   Decrease   basaglar to 22 units daily      Today, reports she was in line for mammogram and felt dizziness and unsteady on her feet. States she did not eat this morning and took glipizide. She reports she believes she was unsteady on her feet due to not having cane.     She will be going on vacation in Aug 2023 -going to [place].    With regards to the diabetes:     Diagnosed with Type 2 diabetes: 2013  Family history of Type 2 diabetes: father  Known complications:  DKA-  RN-; note reviewed; 1/2023  PN- seeing podiatry 12/2022  Nephropathy: now seeing nephrology 12/2021  Gastroparesis: denies   CAD: 3 MI and one stroke     Current regimen:  Trulicity 3.0 mg weekly-was on 4.5 mg weekly but unable to locate from PAP and needs to reapply   Glipizide 10 mg with breakfast and 5 mg with dinner  basaglar 22 units daily  whenever her  injects    NOVOLOG PRN steroid injection-none recent    Other medications tried:  Jardiance-insurance issues  Brad Whitlock - did not want to start due to SE profile     Glucose Monitor:  She is checking blood sugar as below  Log reviewed: yes, digital medicine program      Hypoglycemia: denies and denies s/s of hypoglycemia   Denies symptoms of hypoglycemia-hypoglycemia unawareness     Knows how to correct with 15 grams of carbs- juice, coke, or a peppermint.      Diet/Exercise: She was seeing a dietician -Jimmy. She is now on protein shakes and food -careful with her food choices.   Gives up sweets for LENT.   Breakfast: cottage cheese with fruit or activa or fried egg with pepper and ham or turkey and cheese with bread   Lunch sometimes skipped  Dinner sometimes skipped    She eats nuts in the afternoon   Main meal is vegetable and meat sometimes rice   Snacks: nuts and something sweet nightly.   Drinks: mostly water; OJ with breakfast; sometimes has lemon lime soft drink at times  Exercise - she is doing bike 60 minutes 4-5 times a week    Education - last visit: saw in Nov 2022  Has GVOKE    Denies history of pancreatitis & personal/family history of medullary thyroid cancer.    Lab Results   Component Value Date    HGBA1C 6.3 (H) 07/18/2023    HGBA1C 5.9 (H) 02/16/2023    HGBA1C 6.2 (H) 09/29/2022     Screening or Prevention Patient's value Goal Complete/Controlled?   HgA1C Testing and Control   Lab Results   Component Value Date    HGBA1C 6.3 (H) 07/18/2023      Annually/Less than 8% Yes   Lipid profile : 09/29/2022 Annually Yes   LDL control Lab Results   Component Value Date    LDLCALC 39.4 (L) 09/29/2022    Annually/Less than 100 mg/dl  Yes   Nephropathy screening Lab Results   Component Value Date    LABMICR 5.0 07/18/2023     Lab Results   Component Value Date    PROTEINUA Negative 06/10/2022    Annually No   Blood pressure BP Readings from Last 1 Encounters:   07/25/23 116/80     Less than 140/90 Yes   Dilated retinal exam : 05/26/2023 Annually Yes   Foot exam   : 08/22/2022 Annually Yes     With regards to osteoporosis:   Current meds: Fosamax  Started: 6/2019    Family history: mother     Calcium intake- no calcium supplementation; has in diet   Vit D intake-  1000 twice weekly   Weight bearing exercise-   Walking as tolerated with knee pain    Recent falls- August 2018; September 2018 - no fractures and no falls from a standing position.      She fell in July 2022 stepped on her sons foot. No fractures      They have made fall precautions in house   No recent fractures   No significant height loss (>2 inches)     tob use -  None  etoh use- some 1-2 glasses of wine every once in awhile     No current diarrhea or h/o malabsorption     Menopause at age 56     Denies chronic exposure to anticoagulants, proton pump inhibitors or antiseizure medications.   +Steroid injections knees     She is using cane    Denies GERD, indigestion, or gastric bypass surgery.      Denies history of thyroid disease, anemia, kidney stones or kidney disease.      Denies active malignancy, history of malignancy the involved the bone, prior radiation treatment, or unexplained elevations of alk phos on labs      BMD 6/9/2023  FINDINGS:  Lumbar spine (L1-L4):               T-score is 0.7, and Z-score is 2.5.     Compared with previous DXA, BMD at the lumbar spine has remained stable.     Femoral neck:                          T-score is -1.9, and Z-score is -0.6.     Total hip:                                T-score is -0.8, and Z-score is 0.1.     Compared with previous DXA, BMD at the total hip has remained stable.     Distal 1/3 radius:                      Not applicable      Fracture Risk (FRAX)   7.7% risk of a major osteoporotic fracture in the next 10 years.   1.5% risk of hip fracture in the next 10 years.     Impression:   *Osteoporosis on treatment with Fosamax     RECOMMENDATIONS:  *Daily calcium intake  3118-3508 mg, dietary sources preferred; Vitamin D 2979-9144 IU daily.  *Weight bearing exercise and fall precautions.  *If the patient has been treated with an oral bisphosphonate for a period of at least 5 years and has not fractured, a drug holiday can be considered. Continuing treatment is also reasonable if the patient is deemed to be at high risk for fracture.  *Repeat BMD in 2 years.     With regards to the vitamin d deficiency and hypercalcemia,     Transient increase in calcium level in Sept 2021 that normalized when we decrease Vitamin D and stopped chlorthalidone     Currently taking otc 1000 twice weekly    Lab Results   Component Value Date    PTH 53.1 01/06/2022    CALCIUM 9.7 07/18/2023    PHOS 2.4 (L) 06/21/2022     Lab Results   Component Value Date    ALBUMIN 3.6 07/18/2023     Vit D, 25-Hydroxy   Date Value Ref Range Status   11/26/2021 66 30 - 96 ng/mL Final     Comment:     Vitamin D deficiency.........<10 ng/mL                              Vitamin D insufficiency......10-29 ng/mL       Vitamin D sufficiency........> or equal to 30 ng/mL  Vitamin D toxicity............>100 ng/mL       Denies constipation, depression,   Increased polyuria due to lasix  - muscle aches or pains.    No recent fractures     Does report some diarrhea, especially if eating out. States this has been ongoing for the last couple of years. Has seen GI in the past. Does take Lomtil with some relief.     Review of Systems   Constitutional:  Negative for fatigue and unexpected weight change.   Eyes:  Negative for visual disturbance.   Endocrine: Negative for polydipsia, polyphagia and polyuria.   Musculoskeletal:  Negative for arthralgias, gait problem and myalgias.   Hematological:  Does not bruise/bleed easily.   As above    OBJECTIVE    Physical Exam  Constitutional:       Appearance: Normal appearance.   Neck:      Thyroid: No thyromegaly.   Pulmonary:      Effort: Pulmonary effort is normal.   Neurological:      Mental  "Status: She is alert.   injection sites are without edema or erythema. No lipo hypertropthy or atrophy  DM foot exam deferred; done in 8/2022    Wt Readings from Last 3 Encounters:   07/25/23 1411 108.3 kg (238 lb 10.4 oz)   06/07/23 1418 105.7 kg (233 lb)   06/02/23 1344 106.1 kg (233 lb 14.5 oz)     Vitals:    07/25/23 1411   BP: 116/80   Pulse: 92         Current Outpatient Medications:     acetaminophen (TYLENOL) 500 MG tablet, Take 500 mg by mouth 2 (two) times daily as needed for Pain., Disp: , Rfl:     alendronate (FOSAMAX) 70 MG tablet, TAKE 1 TABLET(70 MG) BY MOUTH EVERY 7 DAYS, Disp: 12 tablet, Rfl: 1    amLODIPine (NORVASC) 10 MG tablet, TAKE 1 TABLET(10 MG) BY MOUTH EVERY DAY, Disp: 90 tablet, Rfl: 2    BD BOO 2ND GEN PEN NEEDLE 32 gauge x 5/32" Ndle, To injection daily, Disp: 100 each, Rfl: 8    blood sugar diagnostic Strp, 1 strip by Misc.(Non-Drug; Combo Route) route once daily., Disp: 100 strip, Rfl: 3    carvediloL (COREG) 25 MG tablet, TAKE 1 TABLET(25 MG) BY MOUTH TWICE DAILY WITH MEALS, Disp: 180 tablet, Rfl: 2    clopidogreL (PLAVIX) 75 mg tablet, TAKE 1 TABLET(75 MG) BY MOUTH EVERY DAY, Disp: 90 tablet, Rfl: 3    COD LIVER OIL ORAL, TAKE 2 CAPSULES BY MOUTH EVERY MORNING AND 1 CAPSULE EVERY EVENING, Disp: , Rfl:     diclofenac sodium (VOLTAREN) 1 % Gel, Apply 2 g topically 4 (four) times daily as needed. To painful area on the feet. (Patient taking differently: Apply 2 g topically 4 (four) times daily as needed. To painful area on the feet and knee), Disp: 500 g, Rfl: 5    ferrous sulfate (FEROSUL) 325 mg (65 mg iron) Tab tablet, TAKE 1 TABLET BY MOUTH DAILY, Disp: 90 tablet, Rfl: 0    FLUZONE HIGHDOSE QUAD 22-23  mcg/0.7 mL Syrg, , Disp: , Rfl:     furosemide (LASIX) 20 MG tablet, TAKE 1 TABLET BY MOUTH ON SATURDAY, SUNDAY, MONDAY, WEDNESDAY AND FRIDAY, Disp: 30 tablet, Rfl: 3    glucagon (GVOKE HYPOPEN 2-PACK) 1 mg/0.2 mL AtIn, Inject 1 Package into the skin as needed., Disp: 2 " Syringe, Rfl: 0    insulin glargine,hum.rec.anlog (BASAGLAR KWIKPEN U-100 INSULIN SUBQ), Inject 22 Units into the skin every evening., Disp: , Rfl:     irbesartan (AVAPRO) 300 MG tablet, TAKE 1 TABLET(300 MG) BY MOUTH EVERY EVENING, Disp: 90 tablet, Rfl: 2    isosorbide mononitrate (IMDUR) 30 MG 24 hr tablet, TAKE 2 TABLETS(60 MG) BY MOUTH EVERY DAY, Disp: 180 tablet, Rfl: 3    lancets Misc, 1 lancet by Misc.(Non-Drug; Combo Route) route once daily., Disp: 100 each, Rfl: 3    multivitamin (THERAGRAN) per tablet, Take 1 tablet by mouth once daily., Disp: , Rfl:     PFIZER COVID BIVAL,12Y UP,,PF, 30 mcg/0.3 mL injection, , Disp: , Rfl:     SHINGRIX, PF, 50 mcg/0.5 mL injection, , Disp: , Rfl:     vitamin D (VITAMIN D3) 1000 units Tab, Take 1,000 Units by mouth twice a week. Monday AND THURSDAYS ONLY, Disp: , Rfl:     acetaminophen (TYLENOL) 500 MG tablet, Take 1 tablet (500 mg total) by mouth every 6 (six) hours as needed for Pain. (Patient not taking: Reported on 6/7/2023), Disp: 20 tablet, Rfl: 0    ammonium lactate (LAC-HYDRIN) 12 % lotion, Apply topically as needed for Dry Skin. On the feet and toenails. (Patient not taking: Reported on 6/7/2023), Disp: 225 g, Rfl: 6    aspirin (ECOTRIN) 81 MG EC tablet, Take 1 tablet (81 mg total) by mouth 2 (two) times a day., Disp: 60 tablet, Rfl: 0    atorvastatin (LIPITOR) 80 MG tablet, Take 1 tablet (80 mg total) by mouth once daily., Disp: 90 tablet, Rfl: 0    blood-glucose meter,continuous (DEXCOM G7 ) Misc, To use with sensor, Disp: 1 each, Rfl: 0    blood-glucose sensor (DEXCOM G7 SENSOR) Areli, To change every 10 days, Disp: 3 each, Rfl: 3    cefadroxil (DURICEF) 500 MG Cap, Take 1 capsule (500 mg total) by mouth every 12 (twelve) hours., Disp: 20 capsule, Rfl: 0    celecoxib (CELEBREX) 200 MG capsule, Take 1 capsule (200 mg total) by mouth once daily., Disp: 15 capsule, Rfl: 0    diphenoxylate-atropine 2.5-0.025 mg (LOMOTIL) 2.5-0.025 mg per tablet, TAKE 1  TABLET BY MOUTH FOUR TIMES DAILY AS NEEDED, Disp: 30 tablet, Rfl: 3    dulaglutide (TRULICITY) 4.5 mg/0.5 mL pen injector, Inject 4.5 mg into the skin every 7 days. (Patient taking differently: Inject 4.5 mg into the skin every Sunday.), Disp: 2 mL, Rfl: 3    esomeprazole (NEXIUM) 20 MG capsule, Take 1 capsule (20 mg total) by mouth before breakfast. for 20 days, Disp: 20 capsule, Rfl: 0    glipiZIDE (GLUCOTROL) 5 MG tablet, Take 2 tablets (10 mg total) by mouth 2 (two) times daily with meals., Disp: 360 tablet, Rfl: 3    nitroGLYCERIN (NITROSTAT) 0.4 MG SL tablet, Place 1 tablet (0.4 mg total) under the tongue every 5 (five) minutes as needed for Chest pain., Disp: 25 tablet, Rfl: 6    oxyCODONE (ROXICODONE) 5 MG immediate release tablet, Take 1 tablet (5 mg total) by mouth every 4 (four) hours as needed for Pain., Disp: 20 tablet, Rfl: 0    Current Facility-Administered Medications:     hyaluronate sodium, stabilized (MONOVISC) Syrg, , Intra-articular, 1 time in Clinic/HOD, Peterson Sharma MD    Labs reviewed    Assessment and plan:   1. Type 2 diabetes mellitus with stage 3a chronic kidney disease, without long-term current use of insulin  Comprehensive Metabolic Panel    Hemoglobin A1C    Lipid Panel    Basic Metabolic Panel      2. Osteoporosis without current pathological fracture, unspecified osteoporosis type        3. Morbid obesity with body mass index (BMI) of 40.0 or higher        4. Dyslipidemia, goal LDL below 70        5. Essential hypertension        6. Vitamin D deficiency, unspecified        7. Type 2 diabetes mellitus with stage 3a chronic kidney disease, with long-term current use of insulin  blood-glucose sensor (DEXCOM G7 SENSOR) Areli    blood-glucose meter,continuous (DEXCOM G7 ) Misc    glipiZIDE (GLUCOTROL) 5 MG tablet              Type 2 diabetes mellitus with stage 3 chronic kidney disease, without long-term current use of insulin  -- Labs prior  -- A1c 7-7.5% without  hypoglycemia.   Discussed her A1c is at goal but I am concerned about hypoglycemia.   She is having hypoglycemia unawareness. I recommend dexcom for high and low blood sugar alerts. She also had hypoglycemia on labs BG 66 and did was not aware.     Patient has diabetes mellitus and manages diabetes with an intensive insulin regimen. The patient requires a therapeutic CGM and is willing to use therapeutic CGM for the necesary frequent adjustments of insulin therapy. Patient has been using SMBG for frequent glucose monitoring (4+ times daily). I have completed an in-person visit during the previous 6 months and will continue to have in-person visits every 6 months to assess adherence to their CGM regimen and diabetes treatment plan.    Medication Changes   Continue   Trulicity 3.0 mg weekly  Glipizide 10 mg in AM and 5 mg in PM.  Discussed glipizide must be taken with food.   Decrease basaglar 20 units daily     Message me for any blood sugar less than 70 and we will adjustments to your regimen. Message me for persistent blood sugars above 180.  -- We will continue novolog for steroid injections. Discussed she will take the novolog before meals with sliding scale below.  With using correction scale:  MDD of:   150-200: +2 units  201-250: +4 units  251-300: +6 units  301-350: +8 units  >350: +10 units    -- Has GVOKE pen.   -- She is fearful of DKA Discussed signs and symptoms of DKA and instructed to buy ketone strips  -- Reviewed goals of therapy are to get the best control we can without hypoglycemia  -- Insulin injection sites and proper rotation instructed.    -- Advised frequent self blood glucose monitoring.  Patient encouraged to document glucose results and bring them to every clinic visit.  -- Hypoglycemia precautions discussed. Instructed on precautions before driving.    -- Call for Bg repeatedly < 90 or > 180.   -- Close adherence to lifestyle changes recommended.   -- Periodic follow ups for eye  evaluations, foot care and dental care suggested. UTD    Osteoporosis without current pathological fracture  -- Continue fosamax weekly  -- Reviewed basics of quantity versus quality  -- Reassuring she is not fracturing   -- RDA of calcium (2791-9948 mg /day in divided doses) and vitamin D 2000iu a day  discussed  -- Calcium from food sources preferred  -- Fall precautions emphasized  -- +weight bearing exercise  -- Repeat DEXA scan in June 2023     Morbid obesity with body mass index (BMI) of 40.0 or higher  Encouraged continued exercise and diet.  Given information on diet    Dyslipidemia, goal LDL below 70  -- Controlled   -- On statin per ADA recommendations    Essential hypertension  -- on ARB  -- Controlled  -- Blood pressure goals discussed with patient    Vitamin D deficiency, unspecified  Normal  Continue vit d 1000 twice weekly      Follow up in about 4 months (around 11/25/2023).  Labs prior to RTC

## 2023-07-24 RX ORDER — ATORVASTATIN CALCIUM 80 MG/1
80 TABLET, FILM COATED ORAL DAILY
Qty: 90 TABLET | Refills: 0 | Status: SHIPPED | OUTPATIENT
Start: 2023-07-24 | End: 2023-10-13

## 2023-07-24 NOTE — ASSESSMENT & PLAN NOTE
-- Continue fosamax weekly  -- Reviewed basics of quantity versus quality  -- Reassuring she is not fracturing   -- RDA of calcium (4943-1472 mg /day in divided doses) and vitamin D 2000iu a day  discussed  -- Calcium from food sources preferred  -- Fall precautions emphasized  -- +weight bearing exercise  -- Repeat DEXA scan in June 2023

## 2023-07-24 NOTE — ASSESSMENT & PLAN NOTE
-- Labs prior  -- A1c 7-7.5% without hypoglycemia.   Discussed her A1c is at goal but I am concerned about hypoglycemia.   She is having hypoglycemia unawareness. I recommend dexcom for high and low blood sugar alerts. She also had hypoglycemia on labs BG 66 and did was not aware.     Patient has diabetes mellitus and manages diabetes with an intensive insulin regimen. The patient requires a therapeutic CGM and is willing to use therapeutic CGM for the necesary frequent adjustments of insulin therapy. Patient has been using SMBG for frequent glucose monitoring (4+ times daily). I have completed an in-person visit during the previous 6 months and will continue to have in-person visits every 6 months to assess adherence to their CGM regimen and diabetes treatment plan.    Medication Changes   Continue   Trulicity 3.0 mg weekly  Glipizide 10 mg in AM and 5 mg in PM.  Discussed glipizide must be taken with food.   Decrease basaglar 20 units daily     Message me for any blood sugar less than 70 and we will adjustments to your regimen. Message me for persistent blood sugars above 180.  -- We will continue novolog for steroid injections. Discussed she will take the novolog before meals with sliding scale below.  With using correction scale:  MDD of:   150-200: +2 units  201-250: +4 units  251-300: +6 units  301-350: +8 units  >350: +10 units    -- Has GVOKE pen.   -- She is fearful of DKA Discussed signs and symptoms of DKA and instructed to buy ketone strips  -- Reviewed goals of therapy are to get the best control we can without hypoglycemia  -- Insulin injection sites and proper rotation instructed.    -- Advised frequent self blood glucose monitoring.  Patient encouraged to document glucose results and bring them to every clinic visit.  -- Hypoglycemia precautions discussed. Instructed on precautions before driving.    -- Call for Bg repeatedly < 90 or > 180.   -- Close adherence to lifestyle changes recommended.   --  Periodic follow ups for eye evaluations, foot care and dental care suggested. UTD

## 2023-07-24 NOTE — TELEPHONE ENCOUNTER
Care Due:                  Date            Visit Type   Department     Provider  --------------------------------------------------------------------------------                                EP -                              PRIMARY      [facility] PRIMARY   Liz Azaleazhao  Last Visit: 12-      CARE ([facility])   CARE           [facility]  Next Visit: None Scheduled  None         None Found                                                            Last  Test          Frequency    Reason                     Performed    Due Date  --------------------------------------------------------------------------------    Lipid Panel.  12 months..  atorvastatin.............  09- 09-    [facility] Embedded Care Due Messages. Reference number: [id number].   7/24/2023 12:14:41 PM CDT

## 2023-07-25 ENCOUNTER — OFFICE VISIT (OUTPATIENT)
Dept: ENDOCRINOLOGY | Facility: CLINIC | Age: 78
End: 2023-07-25
Payer: MEDICARE

## 2023-07-25 ENCOUNTER — HOSPITAL ENCOUNTER (OUTPATIENT)
Dept: RADIOLOGY | Facility: HOSPITAL | Age: 78
Discharge: HOME | End: 2023-07-25
Attending: INTERNAL MEDICINE
Payer: MEDICARE

## 2023-07-25 VITALS
WEIGHT: 238.63 LBS | HEIGHT: 62 IN | OXYGEN SATURATION: 97 % | SYSTOLIC BLOOD PRESSURE: 116 MMHG | HEART RATE: 92 BPM | DIASTOLIC BLOOD PRESSURE: 80 MMHG | BODY MASS INDEX: 43.91 KG/M2

## 2023-07-25 DIAGNOSIS — E11.22 TYPE 2 DIABETES MELLITUS WITH STAGE 3A CHRONIC KIDNEY DISEASE, WITH LONG-TERM CURRENT USE OF INSULIN: ICD-10-CM

## 2023-07-25 DIAGNOSIS — Z12.31 ENCOUNTER FOR SCREENING MAMMOGRAM FOR BREAST CANCER: ICD-10-CM

## 2023-07-25 DIAGNOSIS — E11.22 TYPE 2 DIABETES MELLITUS WITH STAGE 3A CHRONIC KIDNEY DISEASE, WITHOUT LONG-TERM CURRENT USE OF INSULIN: Chronic | ICD-10-CM

## 2023-07-25 DIAGNOSIS — I10 ESSENTIAL HYPERTENSION: Chronic | ICD-10-CM

## 2023-07-25 DIAGNOSIS — E78.5 DYSLIPIDEMIA, GOAL LDL BELOW 70: ICD-10-CM

## 2023-07-25 DIAGNOSIS — N18.31 TYPE 2 DIABETES MELLITUS WITH STAGE 3A CHRONIC KIDNEY DISEASE, WITH LONG-TERM CURRENT USE OF INSULIN: ICD-10-CM

## 2023-07-25 DIAGNOSIS — M81.0 OSTEOPOROSIS WITHOUT CURRENT PATHOLOGICAL FRACTURE, UNSPECIFIED OSTEOPOROSIS TYPE: Chronic | ICD-10-CM

## 2023-07-25 DIAGNOSIS — N18.31 TYPE 2 DIABETES MELLITUS WITH STAGE 3A CHRONIC KIDNEY DISEASE, WITHOUT LONG-TERM CURRENT USE OF INSULIN: Chronic | ICD-10-CM

## 2023-07-25 DIAGNOSIS — Z79.4 TYPE 2 DIABETES MELLITUS WITH STAGE 3A CHRONIC KIDNEY DISEASE, WITH LONG-TERM CURRENT USE OF INSULIN: ICD-10-CM

## 2023-07-25 DIAGNOSIS — E66.01 MORBID OBESITY WITH BODY MASS INDEX (BMI) OF 40.0 OR HIGHER: ICD-10-CM

## 2023-07-25 DIAGNOSIS — E55.9 VITAMIN D DEFICIENCY, UNSPECIFIED: ICD-10-CM

## 2023-07-25 PROCEDURE — 3079F DIAST BP 80-89 MM HG: CPT | Mod: CPTII,S$GLB,, | Performed by: NURSE PRACTITIONER

## 2023-07-25 PROCEDURE — 77067 SCR MAMMO BI INCL CAD: CPT | Mod: TC

## 2023-07-25 PROCEDURE — 3072F LOW RISK FOR RETINOPATHY: CPT | Mod: CPTII,S$GLB,, | Performed by: NURSE PRACTITIONER

## 2023-07-25 PROCEDURE — 77063 BREAST TOMOSYNTHESIS BI: CPT | Mod: 26,,, | Performed by: RADIOLOGY

## 2023-07-25 PROCEDURE — 99999 PR PBB SHADOW E&M-EST. PATIENT-LVL V: ICD-10-PCS | Mod: PBBFAC,,, | Performed by: NURSE PRACTITIONER

## 2023-07-25 PROCEDURE — 3072F PR LOW RISK FOR RETINOPATHY: ICD-10-PCS | Mod: CPTII,S$GLB,, | Performed by: NURSE PRACTITIONER

## 2023-07-25 PROCEDURE — 3074F PR MOST RECENT SYSTOLIC BLOOD PRESSURE < 130 MM HG: ICD-10-PCS | Mod: CPTII,S$GLB,, | Performed by: NURSE PRACTITIONER

## 2023-07-25 PROCEDURE — 77067 SCR MAMMO BI INCL CAD: CPT | Mod: 26,,, | Performed by: RADIOLOGY

## 2023-07-25 PROCEDURE — 3079F PR MOST RECENT DIASTOLIC BLOOD PRESSURE 80-89 MM HG: ICD-10-PCS | Mod: CPTII,S$GLB,, | Performed by: NURSE PRACTITIONER

## 2023-07-25 PROCEDURE — 77067 MAMMO DIGITAL SCREENING BILAT WITH TOMO: ICD-10-PCS | Mod: 26,,, | Performed by: RADIOLOGY

## 2023-07-25 PROCEDURE — 1160F PR REVIEW ALL MEDS BY PRESCRIBER/CLIN PHARMACIST DOCUMENTED: ICD-10-PCS | Mod: CPTII,S$GLB,, | Performed by: NURSE PRACTITIONER

## 2023-07-25 PROCEDURE — 1159F MED LIST DOCD IN RCRD: CPT | Mod: CPTII,S$GLB,, | Performed by: NURSE PRACTITIONER

## 2023-07-25 PROCEDURE — 3074F SYST BP LT 130 MM HG: CPT | Mod: CPTII,S$GLB,, | Performed by: NURSE PRACTITIONER

## 2023-07-25 PROCEDURE — 3288F FALL RISK ASSESSMENT DOCD: CPT | Mod: CPTII,S$GLB,, | Performed by: NURSE PRACTITIONER

## 2023-07-25 PROCEDURE — 99214 OFFICE O/P EST MOD 30 MIN: CPT | Mod: S$GLB,,, | Performed by: NURSE PRACTITIONER

## 2023-07-25 PROCEDURE — 99214 PR OFFICE/OUTPT VISIT, EST, LEVL IV, 30-39 MIN: ICD-10-PCS | Mod: S$GLB,,, | Performed by: NURSE PRACTITIONER

## 2023-07-25 PROCEDURE — 1101F PT FALLS ASSESS-DOCD LE1/YR: CPT | Mod: CPTII,S$GLB,, | Performed by: NURSE PRACTITIONER

## 2023-07-25 PROCEDURE — 3288F PR FALLS RISK ASSESSMENT DOCUMENTED: ICD-10-PCS | Mod: CPTII,S$GLB,, | Performed by: NURSE PRACTITIONER

## 2023-07-25 PROCEDURE — 99999 PR PBB SHADOW E&M-EST. PATIENT-LVL V: CPT | Mod: PBBFAC,,, | Performed by: NURSE PRACTITIONER

## 2023-07-25 PROCEDURE — 1159F PR MEDICATION LIST DOCUMENTED IN MEDICAL RECORD: ICD-10-PCS | Mod: CPTII,S$GLB,, | Performed by: NURSE PRACTITIONER

## 2023-07-25 PROCEDURE — 1160F RVW MEDS BY RX/DR IN RCRD: CPT | Mod: CPTII,S$GLB,, | Performed by: NURSE PRACTITIONER

## 2023-07-25 PROCEDURE — 1126F PR PAIN SEVERITY QUANTIFIED, NO PAIN PRESENT: ICD-10-PCS | Mod: CPTII,S$GLB,, | Performed by: NURSE PRACTITIONER

## 2023-07-25 PROCEDURE — 1101F PR PT FALLS ASSESS DOC 0-1 FALLS W/OUT INJ PAST YR: ICD-10-PCS | Mod: CPTII,S$GLB,, | Performed by: NURSE PRACTITIONER

## 2023-07-25 PROCEDURE — 1126F AMNT PAIN NOTED NONE PRSNT: CPT | Mod: CPTII,S$GLB,, | Performed by: NURSE PRACTITIONER

## 2023-07-25 PROCEDURE — 77063 MAMMO DIGITAL SCREENING BILAT WITH TOMO: ICD-10-PCS | Mod: 26,,, | Performed by: RADIOLOGY

## 2023-07-25 RX ORDER — GLIPIZIDE 5 MG/1
10 TABLET ORAL 2 TIMES DAILY WITH MEALS
Qty: 360 TABLET | Refills: 3 | Status: SHIPPED | OUTPATIENT
Start: 2023-07-25 | End: 2024-07-24

## 2023-07-25 RX ORDER — BLOOD-GLUCOSE,RECEIVER,CONT
EACH MISCELLANEOUS
Qty: 1 EACH | Refills: 0 | Status: SHIPPED | OUTPATIENT
Start: 2023-07-25 | End: 2023-08-02 | Stop reason: SDUPTHER

## 2023-07-25 RX ORDER — BLOOD-GLUCOSE SENSOR
EACH MISCELLANEOUS
Qty: 3 EACH | Refills: 3 | Status: SHIPPED | OUTPATIENT
Start: 2023-07-25 | End: 2023-08-02 | Stop reason: SDUPTHER

## 2023-07-25 NOTE — PATIENT INSTRUCTIONS
Please make appointment with Dr Jones    Diabetes  Discussed her A1c is at goal but I am concerned about hypoglycemia.   She is having hypoglycemia unawareness. I recommend dexcom for high and low blood sugar alerts. She also had hypoglycemia on labs BG 66 and did was not aware.     Patient has diabetes mellitus and manages diabetes with an intensive insulin regimen. The patient requires a therapeutic CGM and is willing to use therapeutic CGM for the necesary frequent adjustments of insulin therapy. Patient has been using SMBG for frequent glucose monitoring (4+ times daily). I have completed an in-person visit during the previous 6 months and will continue to have in-person visits every 6 months to assess adherence to their CGM regimen and diabetes treatment plan.    Medication Changes   Continue   Trulicity 3.0 mg weekly  Glipizide 10 mg in AM and 5 mg in PM. Discussed glipizide must be taken with food.   Decrease   basaglar 20 units daily     [email or web address]  [email or web address]  http://[email or web address]/     Osteoporosis               Continue Vitamin D 2000 IU twice weekly               RDA of calcium is 6033-1195 mg daily, preferable from food               Avoid alcohol and tobacco and falls              Continue fosamax until 6/2024              Check bone density in June 2025

## 2023-07-29 ENCOUNTER — PATIENT MESSAGE (OUTPATIENT)
Dept: ENDOCRINOLOGY | Facility: CLINIC | Age: 78
End: 2023-07-29
Payer: MEDICARE

## 2023-07-29 DIAGNOSIS — I25.118 CORONARY ARTERY DISEASE OF NATIVE ARTERY OF NATIVE HEART WITH STABLE ANGINA PECTORIS: ICD-10-CM

## 2023-07-31 ENCOUNTER — PATIENT MESSAGE (OUTPATIENT)
Dept: CARDIOLOGY | Facility: CLINIC | Age: 78
End: 2023-07-31
Payer: MEDICARE

## 2023-07-31 RX ORDER — CLOPIDOGREL BISULFATE 75 MG/1
TABLET ORAL
Qty: 90 TABLET | Refills: 3 | Status: SHIPPED | OUTPATIENT
Start: 2023-07-31 | End: 2023-08-02 | Stop reason: SDUPTHER

## 2023-08-02 DIAGNOSIS — N18.31 TYPE 2 DIABETES MELLITUS WITH STAGE 3A CHRONIC KIDNEY DISEASE, WITH LONG-TERM CURRENT USE OF INSULIN: ICD-10-CM

## 2023-08-02 DIAGNOSIS — Z79.4 TYPE 2 DIABETES MELLITUS WITH STAGE 3A CHRONIC KIDNEY DISEASE, WITH LONG-TERM CURRENT USE OF INSULIN: ICD-10-CM

## 2023-08-02 DIAGNOSIS — E11.22 TYPE 2 DIABETES MELLITUS WITH STAGE 3A CHRONIC KIDNEY DISEASE, WITH LONG-TERM CURRENT USE OF INSULIN: ICD-10-CM

## 2023-08-02 DIAGNOSIS — I25.118 CORONARY ARTERY DISEASE OF NATIVE ARTERY OF NATIVE HEART WITH STABLE ANGINA PECTORIS: ICD-10-CM

## 2023-08-02 RX ORDER — BLOOD-GLUCOSE SENSOR
EACH MISCELLANEOUS
Qty: 3 EACH | Refills: 3 | Status: SHIPPED | OUTPATIENT
Start: 2023-08-02 | End: 2023-08-08 | Stop reason: SDUPTHER

## 2023-08-02 RX ORDER — CLOPIDOGREL BISULFATE 75 MG/1
75 TABLET ORAL DAILY
Qty: 90 TABLET | Refills: 3 | Status: SHIPPED | OUTPATIENT
Start: 2023-08-02

## 2023-08-02 RX ORDER — BLOOD-GLUCOSE,RECEIVER,CONT
EACH MISCELLANEOUS
Qty: 1 EACH | Refills: 0 | Status: SHIPPED | OUTPATIENT
Start: 2023-08-02 | End: 2023-08-08 | Stop reason: SDUPTHER

## 2023-08-08 DIAGNOSIS — E11.22 TYPE 2 DIABETES MELLITUS WITH STAGE 3A CHRONIC KIDNEY DISEASE, WITH LONG-TERM CURRENT USE OF INSULIN: ICD-10-CM

## 2023-08-08 DIAGNOSIS — Z79.4 TYPE 2 DIABETES MELLITUS WITH STAGE 3A CHRONIC KIDNEY DISEASE, WITH LONG-TERM CURRENT USE OF INSULIN: ICD-10-CM

## 2023-08-08 DIAGNOSIS — N18.31 TYPE 2 DIABETES MELLITUS WITH STAGE 3A CHRONIC KIDNEY DISEASE, WITH LONG-TERM CURRENT USE OF INSULIN: ICD-10-CM

## 2023-08-08 RX ORDER — BLOOD-GLUCOSE SENSOR
EACH MISCELLANEOUS
Qty: 3 EACH | Refills: 3 | Status: SHIPPED | OUTPATIENT
Start: 2023-08-08

## 2023-08-08 RX ORDER — BLOOD-GLUCOSE,RECEIVER,CONT
EACH MISCELLANEOUS
Qty: 1 EACH | Refills: 0 | Status: SHIPPED | OUTPATIENT
Start: 2023-08-08

## 2023-08-21 ENCOUNTER — OFFICE VISIT (OUTPATIENT)
Dept: FAMILY MEDICINE | Facility: CLINIC | Age: 78
End: 2023-08-21
Payer: MEDICARE

## 2023-08-21 ENCOUNTER — TELEPHONE (OUTPATIENT)
Dept: ADMINISTRATIVE | Facility: CLINIC | Age: 78
End: 2023-08-21
Payer: MEDICARE

## 2023-08-21 ENCOUNTER — PATIENT MESSAGE (OUTPATIENT)
Dept: ADMINISTRATIVE | Facility: CLINIC | Age: 78
End: 2023-08-21
Payer: MEDICARE

## 2023-08-21 VITALS — BODY MASS INDEX: 43.79 KG/M2 | HEIGHT: 62 IN | WEIGHT: 238 LBS

## 2023-08-21 DIAGNOSIS — Z99.89 DEPENDENCE ON OTHER ENABLING MACHINES AND DEVICES: ICD-10-CM

## 2023-08-21 DIAGNOSIS — Z86.73 HISTORY OF TIA (TRANSIENT ISCHEMIC ATTACK): ICD-10-CM

## 2023-08-21 DIAGNOSIS — Z00.00 ENCOUNTER FOR PREVENTIVE HEALTH EXAMINATION: Primary | ICD-10-CM

## 2023-08-21 DIAGNOSIS — M81.0 AGE-RELATED OSTEOPOROSIS WITHOUT CURRENT PATHOLOGICAL FRACTURE: ICD-10-CM

## 2023-08-21 DIAGNOSIS — E66.01 MORBID OBESITY WITH BODY MASS INDEX (BMI) OF 40.0 OR HIGHER: ICD-10-CM

## 2023-08-21 DIAGNOSIS — G47.33 OSA ON CPAP: ICD-10-CM

## 2023-08-21 DIAGNOSIS — N18.31 TYPE 2 DIABETES MELLITUS WITH STAGE 3A CHRONIC KIDNEY DISEASE, WITHOUT LONG-TERM CURRENT USE OF INSULIN: Chronic | ICD-10-CM

## 2023-08-21 DIAGNOSIS — I10 ESSENTIAL HYPERTENSION: Chronic | ICD-10-CM

## 2023-08-21 DIAGNOSIS — E11.22 TYPE 2 DIABETES MELLITUS WITH STAGE 3A CHRONIC KIDNEY DISEASE, WITHOUT LONG-TERM CURRENT USE OF INSULIN: Chronic | ICD-10-CM

## 2023-08-21 DIAGNOSIS — I70.0 AORTIC ATHEROSCLEROSIS: ICD-10-CM

## 2023-08-21 DIAGNOSIS — R26.9 ABNORMALITY OF GAIT AND MOBILITY: ICD-10-CM

## 2023-08-21 DIAGNOSIS — E55.9 VITAMIN D DEFICIENCY, UNSPECIFIED: ICD-10-CM

## 2023-08-21 DIAGNOSIS — I50.32 CHRONIC HEART FAILURE WITH PRESERVED EJECTION FRACTION: ICD-10-CM

## 2023-08-21 DIAGNOSIS — D69.2 PURPURA: ICD-10-CM

## 2023-08-21 PROCEDURE — 3288F FALL RISK ASSESSMENT DOCD: CPT | Mod: CPTII,95,,

## 2023-08-21 PROCEDURE — 1101F PR PT FALLS ASSESS DOC 0-1 FALLS W/OUT INJ PAST YR: ICD-10-PCS | Mod: CPTII,95,,

## 2023-08-21 PROCEDURE — 1160F RVW MEDS BY RX/DR IN RCRD: CPT | Mod: CPTII,95,,

## 2023-08-21 PROCEDURE — 1159F MED LIST DOCD IN RCRD: CPT | Mod: CPTII,95,,

## 2023-08-21 PROCEDURE — 1160F PR REVIEW ALL MEDS BY PRESCRIBER/CLIN PHARMACIST DOCUMENTED: ICD-10-PCS | Mod: CPTII,95,,

## 2023-08-21 PROCEDURE — 1159F PR MEDICATION LIST DOCUMENTED IN MEDICAL RECORD: ICD-10-PCS | Mod: CPTII,95,,

## 2023-08-21 PROCEDURE — 1101F PT FALLS ASSESS-DOCD LE1/YR: CPT | Mod: CPTII,95,,

## 2023-08-21 PROCEDURE — 1126F PR PAIN SEVERITY QUANTIFIED, NO PAIN PRESENT: ICD-10-PCS | Mod: CPTII,95,,

## 2023-08-21 PROCEDURE — G0439 PR MEDICARE ANNUAL WELLNESS SUBSEQUENT VISIT: ICD-10-PCS | Mod: 95,,,

## 2023-08-21 PROCEDURE — 1170F PR FUNCTIONAL STATUS ASSESSED: ICD-10-PCS | Mod: CPTII,95,,

## 2023-08-21 PROCEDURE — 1170F FXNL STATUS ASSESSED: CPT | Mod: CPTII,95,,

## 2023-08-21 PROCEDURE — 3288F PR FALLS RISK ASSESSMENT DOCUMENTED: ICD-10-PCS | Mod: CPTII,95,,

## 2023-08-21 PROCEDURE — G0439 PPPS, SUBSEQ VISIT: HCPCS | Mod: 95,,,

## 2023-08-21 PROCEDURE — 1126F AMNT PAIN NOTED NONE PRSNT: CPT | Mod: CPTII,95,,

## 2023-08-21 NOTE — PATIENT INSTRUCTIONS
Counseling and Referral of Other Preventative  (Italic type indicates deductible and co-insurance are waived)    Patient Name: Kaylee Romero  Today's Date: 8/21/2023    Health Maintenance       Date Due Completion Date    Foot Exam 08/22/2023 8/22/2022 (Done)    Override on 8/22/2022: Done    Override on 10/5/2020: Done    Override on 10/21/2019: Done    COVID-19 Vaccine (5 - Moderna series) 11/30/2023 (Originally 3/29/2023) 11/29/2022    Influenza Vaccine (1) 09/01/2023 12/16/2022    Lipid Panel 09/29/2023 9/29/2022    Hemoglobin A1c 01/18/2024 7/18/2023    Eye Exam 05/26/2024 5/26/2023    Override on 4/8/2022: Done    Override on 7/24/2018: Done    Diabetes Urine Screening 07/18/2024 7/18/2023    Mammogram 07/25/2024 7/25/2023    Aspirin/Antiplatelet Therapy 08/02/2024 8/2/2023    DEXA Scan 06/09/2025 6/9/2023    TETANUS VACCINE 07/18/2025 7/18/2015        No orders of the defined types were placed in this encounter.    The following information is provided to all patients.  This information is to help you find resources for any of the problems found today that may be affecting your health:                Living healthy guide: www.[facility].[place].gov      Understanding Diabetes: www.diabetes.org      Eating healthy: www.cdc.gov/healthyweight      CDC home safety checklist: www.cdc.gov/steadi/patient.html      Agency on Aging: www.goea.[place].[facility]      Alcoholics anonymous (AA): www.aa.org      Physical Activity: www.jay.nih.gov/ok6gkrj      Tobacco use: www.quitwithusla.org

## 2023-08-21 NOTE — PROGRESS NOTES
"    The patient location is: [place]  The chief complaint leading to consultation is: Medicare AWV     Visit type: audiovisual    Face to Face time with patient: 45  60 minutes of total time spent on the encounter, which includes face to face time and non-face to face time preparing to see the patient (eg, review of tests), Obtaining and/or reviewing separately obtained history, Documenting clinical information in the electronic or other health record, Independently interpreting results (not separately reported) and communicating results to the patient/family/caregiver, or Care coordination (not separately reported).         Each patient to whom he or she provides medical services by telemedicine is:  (1) informed of the relationship between the physician and patient and the respective role of any other health care provider with respect to management of the patient; and (2) notified that he or she may decline to receive medical services by telemedicine and may withdraw from such care at any time.    Notes:       Kaylee Romero presented for a  Medicare AWV and comprehensive Health Risk Assessment today. The following components were reviewed and updated:    Medical history  Family History  Social history  Allergies and Current Medications  Health Risk Assessment  Health Maintenance  Care Team         ** See Completed Assessments for Annual Wellness Visit within the encounter summary.**         The following assessments were completed:  Living Situation  CAGE  Depression Screening  Fall Risk Assessment (MACH 10)  Hearing Assessment(HHI)  Cognitive Function Screening  Nutrition Screening  ADL Screening  PAQ Screening      Vitals:    08/21/23 1409   Weight: 108 kg (238 lb)   Height: 5' 2" (1.575 m)     Body mass index is 43.53 kg/m².  Physical Exam  Constitutional:       General: She is not in acute distress.     Appearance: Normal appearance. She is well-developed and well-groomed.   Eyes:      Comments: Wearing " glasses    Skin:     Coloration: Skin is not pale.   Neurological:      Mental Status: She is alert and oriented to person, place, and time.   Psychiatric:         Mood and Affect: Mood normal.         Speech: Speech normal.         Behavior: Behavior normal. Behavior is cooperative.         Thought Content: Thought content normal.               Diagnoses and health risks identified today and associated recommendations/orders:    1. Encounter for preventive health examination  Screenings performed, as noted above. Personal preventative testing needs reviewed.     2. Chronic heart failure with preserved ejection fraction  3. Aortic atherosclerosis  Chronic; stable on medication. Followed by Cardiology.    4. Purpura  Chronic. Stable. Followed by PCP.     5. Type 2 diabetes mellitus with stage 3a chronic kidney disease, without long-term current use of insulin  Chronic; stable on medication. Followed by Endocrinology.     6. Morbid obesity with body mass index (BMI) of 40.0 or higher  Work on diet modification and increase in physical activity as tolerated. Followed by PCP.     7. Essential hypertension  Chronic; stable on medication. Followed by Cardiology.    8. Age-related osteoporosis without current pathological fracture  Chronic. Stable with medication. Followed by PCP.     9. History of TIA (transient ischemic attack)  Stable. Followed by PCP.     10. Vitamin D deficiency, unspecified  Chronic. Stable with medication. Followed by PCP.     11. KAMRAN on CPAP  Chronic. Stable with CPAP. Followed by Sleep medicine.     12. Dependence on other enabling machines and devices  13. Abnormality of gait and mobility  S/p right TKA 03/2023. Continue use of ambulatory assistive devices as needed. Followed by Orthopedics.       Review for Opioid Screening: Patient does not have rx for Opioids.    Review for Substance Use Disorders: Patient does not use substance.    Kwan Kaylee with a 5-10 year written screening schedule  and personal prevention plan. Recommendations were developed using the USPSTF age appropriate recommendations. Education, counseling, and referrals were provided as needed. After Visit Summary printed and given to patient which includes a list of additional screenings\tests needed.    Follow up in about 1 year (around 8/21/2024) for your next annual wellness visit.    Myriam Hopkins NP      Advance Care Planning     I offered to discuss advanced care planning, including how to pick a person who would make decisions for you if you were unable to make them for yourself, called a health care power of , and what kind of decisions you might make such as use of life sustaining treatments such as ventilators and tube feeding when faced with a life limiting illness recorded on a living will that they will need to know. (How you want to be cared for as you near the end of your natural life)     X  Patient is unwilling to engage in a discussion regarding advance directives at this time.

## 2023-08-23 ENCOUNTER — LAB VISIT (OUTPATIENT)
Dept: LAB | Facility: HOSPITAL | Age: 78
End: 2023-08-23
Attending: NURSE PRACTITIONER
Payer: MEDICARE

## 2023-08-23 DIAGNOSIS — E11.22 TYPE 2 DIABETES MELLITUS WITH STAGE 3A CHRONIC KIDNEY DISEASE, WITHOUT LONG-TERM CURRENT USE OF INSULIN: Chronic | ICD-10-CM

## 2023-08-23 DIAGNOSIS — N18.31 TYPE 2 DIABETES MELLITUS WITH STAGE 3A CHRONIC KIDNEY DISEASE, WITHOUT LONG-TERM CURRENT USE OF INSULIN: Chronic | ICD-10-CM

## 2023-08-23 LAB
ANION GAP SERPL CALC-SCNC: 6 MMOL/L (ref 8–16)
BUN SERPL-MCNC: 25 MG/DL (ref 8–23)
CALCIUM SERPL-MCNC: 9.8 MG/DL (ref 8.7–10.5)
CHLORIDE SERPL-SCNC: 104 MMOL/L (ref 95–110)
CO2 SERPL-SCNC: 25 MMOL/L (ref 23–29)
CREAT SERPL-MCNC: 1.2 MG/DL (ref 0.5–1.4)
EST. GFR  (NO RACE VARIABLE): 46.9 ML/MIN/1.73 M^2
GLUCOSE SERPL-MCNC: 173 MG/DL (ref 70–110)
POTASSIUM SERPL-SCNC: 4.8 MMOL/L (ref 3.5–5.1)
SODIUM SERPL-SCNC: 135 MMOL/L (ref 136–145)

## 2023-08-23 PROCEDURE — 36415 COLL VENOUS BLD VENIPUNCTURE: CPT | Mod: PN | Performed by: NURSE PRACTITIONER

## 2023-08-23 PROCEDURE — 80048 BASIC METABOLIC PNL TOTAL CA: CPT | Performed by: NURSE PRACTITIONER

## 2023-08-25 ENCOUNTER — PATIENT MESSAGE (OUTPATIENT)
Dept: ENDOCRINOLOGY | Facility: CLINIC | Age: 78
End: 2023-08-25
Payer: MEDICARE

## 2023-08-25 ENCOUNTER — CLINICAL SUPPORT (OUTPATIENT)
Dept: DIABETES | Facility: CLINIC | Age: 78
End: 2023-08-25
Payer: MEDICARE

## 2023-08-25 ENCOUNTER — PATIENT MESSAGE (OUTPATIENT)
Dept: CARDIOLOGY | Facility: CLINIC | Age: 78
End: 2023-08-25
Payer: MEDICARE

## 2023-08-25 DIAGNOSIS — N18.31 TYPE 2 DIABETES MELLITUS WITH STAGE 3A CHRONIC KIDNEY DISEASE, WITHOUT LONG-TERM CURRENT USE OF INSULIN: Primary | Chronic | ICD-10-CM

## 2023-08-25 DIAGNOSIS — N18.31 TYPE 2 DIABETES MELLITUS WITH STAGE 3A CHRONIC KIDNEY DISEASE, WITHOUT LONG-TERM CURRENT USE OF INSULIN: Chronic | ICD-10-CM

## 2023-08-25 DIAGNOSIS — E11.22 TYPE 2 DIABETES MELLITUS WITH STAGE 3A CHRONIC KIDNEY DISEASE, WITHOUT LONG-TERM CURRENT USE OF INSULIN: Chronic | ICD-10-CM

## 2023-08-25 DIAGNOSIS — E11.22 TYPE 2 DIABETES MELLITUS WITH STAGE 3A CHRONIC KIDNEY DISEASE, WITHOUT LONG-TERM CURRENT USE OF INSULIN: Primary | Chronic | ICD-10-CM

## 2023-08-25 PROCEDURE — G0108 PR DIAB MANAGE TRN  PER INDIV: ICD-10-PCS | Mod: S$GLB,,, | Performed by: NURSE PRACTITIONER

## 2023-08-25 PROCEDURE — G0108 DIAB MANAGE TRN  PER INDIV: HCPCS | Mod: S$GLB,,, | Performed by: NURSE PRACTITIONER

## 2023-08-25 PROCEDURE — 99999 PR PBB SHADOW E&M-EST. PATIENT-LVL I: ICD-10-PCS | Mod: PBBFAC,,,

## 2023-08-25 PROCEDURE — 99999 PR PBB SHADOW E&M-EST. PATIENT-LVL I: CPT | Mod: PBBFAC,,,

## 2023-08-29 DIAGNOSIS — N18.31 TYPE 2 DIABETES MELLITUS WITH STAGE 3A CHRONIC KIDNEY DISEASE, WITHOUT LONG-TERM CURRENT USE OF INSULIN: ICD-10-CM

## 2023-08-29 DIAGNOSIS — E11.22 TYPE 2 DIABETES MELLITUS WITH STAGE 3A CHRONIC KIDNEY DISEASE, WITHOUT LONG-TERM CURRENT USE OF INSULIN: ICD-10-CM

## 2023-08-29 RX ORDER — PEN NEEDLE, DIABETIC 32GX 5/32"
NEEDLE, DISPOSABLE MISCELLANEOUS
Qty: 100 EACH | Refills: 8 | Status: SHIPPED | OUTPATIENT
Start: 2023-08-29

## 2023-08-29 NOTE — PROGRESS NOTES
Diabetes Care Specialist Progress Note  Author: Treasure Delarosa RN, CDE  Date: 8/25/2023    Program Intake  Reason for Diabetes Program Visit:: Intervention  Type of Intervention:: Individual  Individual: Device Training  Device Training: Personal CGM  Current diabetes risk level:: moderate  In the last 12 months, have you:: none  Permission to speak with others about care:: yes (Spouse present at visit)    Lab Results   Component Value Date    HGBA1C 6.3 (H) 07/18/2023       Clinical         Problem Review  Reviewed Problem List with Patient: yes  Active comorbidities affecting diabetes self-care.: yes  Comorbidities: Cardiovascular Disease, Hypertension, Chronic Kidney Disease  Reviewed health maintenance: yes    Clinical Assessment  Current Diabetes Treatment: Oral Medication, Injectable (Trulicity 4.5 mg weekly Basaglar 25 units daily Glipizide 5 mg in AM with breakfast and 5 mg PM with dinner)    Medication Information  How do you obtain your medications?: Family picks up  How many days a week do you miss your medications?: Never  Do you sometimes have difficulty refilling your medications?: No  Medication adherence impacting ability to self-manage diabetes?: No              Additional Social History    Support  Does anyone support you with your diabetes care?: yes  Who supports you?: spouse  Who takes you to your medical appointments?: spouse  Does the current support meet the patient's needs?: Yes  Is Support an area impacting ability to self-manage diabetes?: No    Access to Mass Media & Technology  Does the patient have access to any of the following devices or technologies?: Smart phone, Internet Access  Media or technology needs impacting ability to self-manage diabetes?: No    Cognitive/Behavioral Health  Alert and Oriented: Yes  Difficulty Thinking: No  Requires Prompting: No  Requires assistance for routine expression?: No  Cognitive or behavioral barriers impacting ability to self-manage diabetes?:  No         Communication  Language preference: English  Hearing Problems: No  Vision Problems: No  Communication needs impacting ability to self-manage diabetes?: No    Health Literacy  Preferred Learning Method: Face to Face, Demonstration, Hands On, Reading Materials      Diabetes Self-Management Skills Assessment    Diabetes Disease Process/Treatment Options  Diabetes Disease Process/Treatment Options: Skills Assessment Completed: No  Deferred due to:: Time    Nutrition/Healthy Eating  Nutrition/Healthy Eating Skills Assessment Completed:: No  Deffered due to:: Time    Physical Activity/Exercise  Physical Activity/Exercise Skills Assessment Completed: : No  Deffered due to:: Time    Medications  Patient is able to describe current diabetes management routine.: yes  Diabetes management routine:: injectable medications, oral medications, insulin  Patient is able to identify current diabetes medications, dosages, and appropriate timing of medications.: yes  Patient understands the purpose of the medications taken for diabetes.: yes  Patient reports problems or concerns with current medication regimen.: no  Medication Skills Assessment Completed:: Yes  Assessment indicates:: Adequate understanding  Area of need?: No    Home Blood Glucose Monitoring  Patient states that blood sugar is checked at home daily.: no  Reasons for not monitoring:: needs training  Home Blood Glucose Monitoring Skills Assessment Completed: : Yes  Assessment indicates:: Instruction Needed  Area of need?: Yes    Acute Complications  Patient is able to identify types of acute complications: Yes  Patient Identified:: Hypoglycemia  Acute Complications Skills Assessment Completed: : Yes  Assessment indicates:: Knowledge deficit  Area of need?: Yes    Chronic Complications  Chronic Complications Skills Assessment Completed: : No  Deferred due to:: Time    Psychosocial/Coping  Patient can identify ways of coping with chronic disease.:  yes  Patient-stated ways of coping with chronic disease:: support from loved ones  Psychosocial/Coping Skills Assessment Completed: : Yes  Assessment indicates:: Adequate understanding  Area of need?: No      Assessment Summary and Plan    Based on today's diabetes care assessment, the following areas of need were identified:          8/25/2023    12:06 AM   Social   Support No   Access to Mass Media/Tech No   Cognitive/Behavioral Health No   Communication No            8/25/2023    12:06 AM   Clinical   Medication Adherence No            8/25/2023    12:06 AM   Diabetes Self-Management Skills   Medication No   Home Blood Glucose Monitoring Yes, see care planning   Acute Complications Yes - Reviewed blood glucose goals, prevention, detection, signs and symptoms, and treatment of hypoglycemia following rule of 15 and hyperglycemia, and when to contact the clinic. Advised to carry a source of fast acting carbs.    Psychosocial/Coping No          Today's interventions were provided through individual discussion, instruction, and written materials were provided.      Patient verbalized understanding of instruction and written materials.  Pt was able to return back demonstration of instructions today. Patient understood key points, needs reinforcement and further instruction.     Diabetes Self-Management Care Plan:    Today's Diabetes Self-Management Care Plan was developed with Kaylee's input. Kaylee has agreed to work toward the following goal(s) to improve his/her overall diabetes control.      Care Plan: Diabetes Management   Updates made since 7/30/2023 12:00 AM        Problem: Blood Glucose Self-Monitoring         Long-Range Goal: Patient will use CGM sensor values and arrows to predict and treat hypo- and hyper- glycemia.    Start Date: 11/15/2021   Priority: High   Barriers: Knowledge deficit   Note:    11/15/2021 Dexcom training    Update to care planning 8/25/2023:  Patient seen today for Dexcom G7 training. Was  "previously using a Dexcom G7    DEXCOM G7 CGM TRAINING     Patient referred to clinic today for Dexcom G7 continuous glucose sensor system training.  Patient will be using her  for CGM. Education was provided using "Quick Start Guide" per Dexcom protocol.    Overview:  5min glucose reading updates, trending arrows, BG graph screens, battery life indicator, Blue Tooth Symbol.  Pt instructed on lag time of interstitial fluid from CBG and was advised to tx hypoglycemia and dose insulin based on SMBG values.  Patient advised to always check glucose with glucometer should readings do not match symptoms felt (ex. Hypo or hyperglycemia).     Menus: trend Graph, start sensor, enter BG, events, Alerts, Settings  Alerts: Low alert set at 80; High alert set at 250, Urgent low fixed at 55 mg/dl  Reviewed where to find sensor insertion time and sensor expiration date.   Reviewed sensor site selection. Site selected and prepped using aseptic technique Inserted to left upper arm.  Reviewed problem solving aspects of sensor transmission/ variables that can disrupt RF transmission.  range 20 feet           Follow Up Plan     Will f/u with NP in December 2023    Today's care plan and follow up schedule was discussed with patient.  Kaylee verbalized understanding of the care plan, goals, and agrees to follow up plan.        The patient was encouraged to communicate with his/her health care provider/physician and care team regarding his/her condition(s) and treatment.  I provided the patient with my contact information today and encouraged to contact me via phone or [facility] Patient Portal as needed.     Length of Visit   Total Time: 60 Minutes     "

## 2023-09-05 ENCOUNTER — OFFICE VISIT (OUTPATIENT)
Dept: PODIATRY | Facility: CLINIC | Age: 78
End: 2023-09-05
Payer: MEDICARE

## 2023-09-05 VITALS
HEIGHT: 62 IN | DIASTOLIC BLOOD PRESSURE: 71 MMHG | HEART RATE: 94 BPM | BODY MASS INDEX: 43.79 KG/M2 | WEIGHT: 238 LBS | SYSTOLIC BLOOD PRESSURE: 111 MMHG

## 2023-09-05 DIAGNOSIS — L84 CORN OR CALLUS: ICD-10-CM

## 2023-09-05 DIAGNOSIS — E11.49 TYPE II DIABETES MELLITUS WITH NEUROLOGICAL MANIFESTATIONS: Primary | ICD-10-CM

## 2023-09-05 DIAGNOSIS — B35.1 DERMATOPHYTOSIS OF NAIL: ICD-10-CM

## 2023-09-05 DIAGNOSIS — E11.9 ENCOUNTER FOR DIABETIC FOOT EXAM: ICD-10-CM

## 2023-09-05 PROCEDURE — 99499 NO LOS: ICD-10-PCS | Mod: S$GLB,,, | Performed by: PODIATRIST

## 2023-09-05 PROCEDURE — 99999 PR PBB SHADOW E&M-EST. PATIENT-LVL V: ICD-10-PCS | Mod: PBBFAC,,, | Performed by: PODIATRIST

## 2023-09-05 PROCEDURE — 11721 ROUTINE FOOT CARE: ICD-10-PCS | Mod: 59,Q9,S$GLB, | Performed by: PODIATRIST

## 2023-09-05 PROCEDURE — 11721 DEBRIDE NAIL 6 OR MORE: CPT | Mod: 59,Q9,S$GLB, | Performed by: PODIATRIST

## 2023-09-05 PROCEDURE — 11057 PARNG/CUTG B9 HYPRKR LES >4: CPT | Mod: Q9,S$GLB,, | Performed by: PODIATRIST

## 2023-09-05 PROCEDURE — 99999 PR PBB SHADOW E&M-EST. PATIENT-LVL V: CPT | Mod: PBBFAC,,, | Performed by: PODIATRIST

## 2023-09-05 PROCEDURE — 11057 ROUTINE FOOT CARE: ICD-10-PCS | Mod: Q9,S$GLB,, | Performed by: PODIATRIST

## 2023-09-05 PROCEDURE — 99499 UNLISTED E&M SERVICE: CPT | Mod: S$GLB,,, | Performed by: PODIATRIST

## 2023-09-05 RX ORDER — AMOXICILLIN 500 MG/1
CAPSULE ORAL
COMMUNITY
Start: 2023-08-21 | End: 2023-11-08

## 2023-09-05 NOTE — PROGRESS NOTES
Chief Concern: Diabetic Foot Exam (Foot Exam/PCP Liz Roberts MD  08/21/23)      HPI:  Kaylee Romero is a 78 y.o. female presents for foot exam and care.       PCP:  Liz Roberts MD, Diabetic Foot Exam (Foot Exam/PCP Liz Roberts MD  08/21/23)             Patient Active Problem List   Diagnosis    Primary osteoarthritis of right knee    Essential hypertension    Dyslipidemia, goal LDL below 70    Morbid obesity with body mass index (BMI) of 40.0 or higher    KAMRAN on CPAP    History of total knee arthroplasty, left    Physical deconditioning    Osteoporosis without current pathological fracture    History of TIA (transient ischemic attack)    Age-related osteoporosis without current pathological fracture    Vitamin D deficiency, unspecified    Type 2 diabetes mellitus with stage 3 chronic kidney disease, without long-term current use of insulin    History of MI (myocardial infarction)    Coronary 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 pectoris    S/P CABG (coronary artery bypass graft)    Shoulder pain    Physical debility    GRANADO (dyspnea on exertion)    S/P drug eluting coronary stent placement    Abnormal stress test    Aortic atherosclerosis    Chronic heart failure with preserved ejection fraction    Decreased range of motion of right knee    Gait, antalgic    Status post right knee replacement    Purpura           Current Outpatient Medications on File Prior to Visit   Medication Sig Dispense Refill    acetaminophen (TYLENOL) 500 MG tablet Take 500 mg by mouth 2 (two) times daily as needed for Pain.      acetaminophen (TYLENOL) 500 MG tablet Take 1 tablet (500 mg total) by mouth every 6 (six) hours as needed for Pain. 20 tablet 0    alendronate (FOSAMAX) 70 MG tablet TAKE 1 TABLET(70 MG) BY MOUTH EVERY 7 DAYS 12 tablet 1    amLODIPine (NORVASC) 10 MG tablet TAKE 1 TABLET(10 MG) BY MOUTH EVERY DAY 90 tablet 2     "ammonium lactate (LAC-HYDRIN) 12 % lotion Apply topically as needed for Dry Skin. On the feet and toenails. 225 g 6    amoxicillin (AMOXIL) 500 MG capsule TAKE ALL 4 CAPSULES 1 HOUR PRIOR TO APPOINTMENT      atorvastatin (LIPITOR) 80 MG tablet Take 1 tablet (80 mg total) by mouth once daily. 90 tablet 0    BD BOO 2ND GEN PEN NEEDLE 32 gauge x 5/32" Ndle To injection daily 100 each 8    blood sugar diagnostic Strp 1 strip by Misc.(Non-Drug; Combo Route) route once daily. 100 strip 3    blood-glucose meter,continuous (DEXCOM G7 ) Misc To use with sensor 1 each 0    blood-glucose sensor (DEXCOM G7 SENSOR) Areli To change every 10 days 3 each 3    carvediloL (COREG) 25 MG tablet TAKE 1 TABLET(25 MG) BY MOUTH TWICE DAILY WITH MEALS 180 tablet 2    clopidogreL (PLAVIX) 75 mg tablet Take 1 tablet (75 mg total) by mouth once daily. 90 tablet 3    COD LIVER OIL ORAL TAKE 2 CAPSULES BY MOUTH EVERY MORNING AND 1 CAPSULE EVERY EVENING      diclofenac sodium (VOLTAREN) 1 % Gel Apply 2 g topically 4 (four) times daily as needed. To painful area on the feet. (Patient taking differently: Apply 2 g topically 4 (four) times daily as needed. To painful area on the feet and knee) 500 g 5    diphenoxylate-atropine 2.5-0.025 mg (LOMOTIL) 2.5-0.025 mg per tablet TAKE 1 TABLET BY MOUTH FOUR TIMES DAILY AS NEEDED 30 tablet 3    dulaglutide (TRULICITY) 4.5 mg/0.5 mL pen injector Inject 4.5 mg into the skin every 7 days. (Patient taking differently: Inject 4.5 mg into the skin every Sunday.) 2 mL 3    ferrous sulfate (FEROSUL) 325 mg (65 mg iron) Tab tablet TAKE 1 TABLET BY MOUTH DAILY 90 tablet 0    furosemide (LASIX) 20 MG tablet TAKE 1 TABLET BY MOUTH ON SATURDAY, SUNDAY, MONDAY, WEDNESDAY AND FRIDAY 30 tablet 3    glipiZIDE (GLUCOTROL) 5 MG tablet Take 2 tablets (10 mg total) by mouth 2 (two) times daily with meals. 360 tablet 3    glucagon (GVOKE HYPOPEN 2-PACK) 1 mg/0.2 mL AtIn Inject 1 Package into the skin as " "needed. 2 Syringe 0    insulin glargine,hum.rec.anlog (BASAGLAR KWIKPEN U-100 INSULIN SUBQ) Inject 22 Units into the skin every evening.      irbesartan (AVAPRO) 300 MG tablet TAKE 1 TABLET(300 MG) BY MOUTH EVERY EVENING 90 tablet 2    isosorbide mononitrate (IMDUR) 30 MG 24 hr tablet TAKE 2 TABLETS(60 MG) BY MOUTH EVERY  tablet 3    lancets Misc 1 lancet by Misc.(Non-Drug; Combo Route) route once daily. 100 each 3    multivitamin (THERAGRAN) per tablet Take 1 tablet by mouth once daily.      vitamin D (VITAMIN D3) 1000 units Tab Take 1,000 Units by mouth twice a week. Monday AND THURSDAYS ONLY      aspirin (ECOTRIN) 81 MG EC tablet Take 1 tablet (81 mg total) by mouth 2 (two) times a day. 60 tablet 0    nitroGLYCERIN (NITROSTAT) 0.4 MG SL tablet Place 1 tablet (0.4 mg total) under the tongue every 5 (five) minutes as needed for Chest pain. 25 tablet 6     Current Facility-Administered Medications on File Prior to Visit   Medication Dose Route Frequency Provider Last Rate Last Admin    hyaluronate sodium, stabilized (MONOVISC) Syrg   Intra-articular 1 time in Clinic/HOD Peterson Sharma MD             Review of patient's allergies indicates:   Allergen Reactions    Keflex [cephalexin] Other (See Comments)     Turns orange    Bactrim [sulfamethoxazole-trimethoprim]      Generic version  Sulfa makes her sick               Objective:        Vitals:    09/05/23 1335   BP: 111/71   Pulse: 94   Weight: 108 kg (238 lb)   Height: 5' 2" (1.575 m)         Physical Exam  Constitutional:       Appearance: She is well-developed.   Cardiovascular:      Comments: DP and PT pulses 2/4 loreta.   Edema noted loreta.   Capillary refill time < 3 seconds to digits 1-5, loreta.   Absent hair growth      Musculoskeletal:         General: Deformity present. No tenderness. Normal range of motion.      Comments: HAV noted to loreta 1st MPJ. 5/5 strength to all LE muscle groups. No POP noted     Skin:     Capillary Refill: Capillary " refill takes 2 to 3 seconds.      Findings: No erythema.      Comments: Toenails x 10 are elongated by 1-3mm, thickened by 1-3mm and mycotic with subungual debris.  Flaky skin on the soles.  No vesicles or redness.      Neurological:      Mental Status: She is alert and oriented to person, place, and time.      Comments: +paresthesias (Abnormal spontaneous sensations in feet)                 Assessment:       Problem List Items Addressed This Visit    None  Visit Diagnoses       Encounter for diabetic foot exam    -  Primary    Type II diabetes mellitus with neurological manifestations        Dermatophytosis of nail        Corn or callus              Plan:         I counseled the patient on the patient's conditions, their implications and medical management.  Shoe inspection.     Maintain proper foot hygiene.   Continue wearing proper shoe gear, daily foot inspections, never walking without protective shoe gear, never putting sharp instruments to feet.    Routine Foot Care    Date/Time: 9/5/2023 1:45 PM    Performed by: Donna Gillis DPM  Authorized by: Donna Gillis DPM    Consent Done?:  Yes (Verbal)  Hyperkeratotic Skin Lesions?: Yes    Number of trimmed lesions:  6  Location(s):  Right 1st Toe, Left 1st Toe, Left 1st Metatarsal Head, Right 1st Metatarsal Head, Left Plantar and Right Plantar    Nail Care Type:  Debride  Location(s): All  (Left 1st Toe, Left 3rd Toe, Left 2nd Toe, Left 4th Toe, Left 5th Toe, Right 1st Toe, Right 2nd Toe, Right 3rd Toe, Right 4th Toe and Right 5th Toe)  Patient tolerance:  Patient tolerated the procedure well with no immediate complications     With patient's permission, the toenails mentioned above were reduced and debrided using a nail nipper, removing offending nail and debris.   Utilizing a #15 scalpel, I trimmed the corns and calluses at the above mentioned location.      The patient will continue to monitor the areas daily, inspect the feet, wear protective shoe gear when  ambulatory, and moisturizer to maintain skin integrity.                   Donna Gillis DPM

## 2023-09-05 NOTE — PATIENT INSTRUCTIONS
How to Check Your Feet    Below are tips to help you look for foot problems. Try to check your feet at the same time each day, such as when you get out of bed in the morning.    Check the top of each foot. The tops of toes, back of the heel, and outer edge of the foot can get a lot of rubbing from poor-fitting shoes.    Check the bottom of each foot. Daily wear and tear often leads to problems at pressure spots.    Check the toes and nails. Fungal infections often occur between toes. Toenail problems can also be a sign of fungal infections or lead to breaks in the skin.    Check your shoes, too. Loose objects inside a shoe can injure the foot. Use your hand to feel inside your shoes for things like gissell, loose stitching, or rough areas that could irritate your skin.        Diabetic Foot Care    Diabetes can lead to a number of different foot complications. Fortunately, most of these complications can be prevented with a little extra foot care. If diabetes is not well controlled, the high blood sugar can cause damage to blood vessels and result in poor circulation to the foot. When the skin does not get enough blood flow, it becomes prone to pressure sores and ulcers, which heal slowly.  High blood sugar can also damage nerves, interfering with the ability to feel pain and pressure. When you cant feel your foot normally, it is easy to injure your skin, bones and joints without knowing it. For these reasons diabetes increases the risk of fungal infections, bunions and ulcers. Deep ulcers can lead to bone infection. Gangrene is the most serious foot complication of diabetes. It usually occurs on the tips of the toes as blacked areas of skin. The black area is dead tissue. In severe cases, gangrene spreads to involve the entire toe, other toes and the entire foot. Foot or toe amputation may be required. Good foot care and blood sugar control can prevent this.    Home Care  Wear comfortable, proper fitting  shoes.  Wash your feet daily with warm water and mild soap.  After drying, apply a moisturizing cream or lotion.  Check your feet daily for skin breaks, blisters, swelling, or redness. Look between your toes also.  Wear cotton socks and change them every day.  Trim toe nails carefully and do not cut your cuticles.  Strive to keep your blood sugar under control with a combination of medicines, diet and activity.  If you smoke and have diabetes, it is very important that you stop. Smoking reduces blood flow to your foot.  Avoid activities that increase your risk of foot injury:  Do not walk barefoot.  Do not use heating pads or hot water bottles on your feet.  Do not put your foot in a hot tub without first checking the temperature with your hand.  10) Schedule yearly foot exams.    Follow Up  with your doctor or as advised by our staff. Report any cut, puncture, scrape, other injury, blister, ingrown toenail or ulcer on your foot.    Get Prompt Medical Attention  if any of the following occur:  -- Open ulcer with pus draining from the wound  -- Increasing foot or leg pain  -- New areas of redness or swelling or tender areas of the foot    © 1311-2402 [employer]. [address] [place] [address], [place] [address]. All rights reserved. This information is not intended as a substitute for professional medical care. Always follow your healthcare professional's instructions.     General nail care measures for abnormal nails include:  Keeping nails trimmed short, but avoid overzealous trimming  Avoiding trauma   Avoiding contact irritants   Keeping nails dry (avoiding wet work)  Avoiding all nail cosmetics  Wearing shoes that fit well at the toe box.  Avoid tight shoes.

## 2023-09-09 ENCOUNTER — PATIENT MESSAGE (OUTPATIENT)
Dept: PRIMARY CARE CLINIC | Facility: CLINIC | Age: 78
End: 2023-09-09
Payer: MEDICARE

## 2023-09-09 DIAGNOSIS — M81.0 OSTEOPOROSIS WITHOUT CURRENT PATHOLOGICAL FRACTURE, UNSPECIFIED OSTEOPOROSIS TYPE: ICD-10-CM

## 2023-09-11 RX ORDER — ALENDRONATE SODIUM 70 MG/1
70 TABLET ORAL
Qty: 12 TABLET | Refills: 0 | Status: SHIPPED | OUTPATIENT
Start: 2023-09-11 | End: 2023-12-11

## 2023-09-18 ENCOUNTER — PATIENT MESSAGE (OUTPATIENT)
Dept: ENDOCRINOLOGY | Facility: CLINIC | Age: 78
End: 2023-09-18
Payer: MEDICARE

## 2023-09-18 ENCOUNTER — PATIENT MESSAGE (OUTPATIENT)
Dept: DIABETES | Facility: CLINIC | Age: 78
End: 2023-09-18
Payer: MEDICARE

## 2023-09-19 ENCOUNTER — LAB VISIT (OUTPATIENT)
Dept: LAB | Facility: HOSPITAL | Age: 78
End: 2023-09-19
Payer: MEDICARE

## 2023-09-19 DIAGNOSIS — N18.31 STAGE 3A CHRONIC KIDNEY DISEASE: ICD-10-CM

## 2023-09-19 DIAGNOSIS — N18.31 STAGE 3A CHRONIC KIDNEY DISEASE: Primary | ICD-10-CM

## 2023-09-19 LAB
BILIRUB UR QL STRIP: NEGATIVE
CLARITY UR REFRACT.AUTO: CLEAR
COLOR UR AUTO: NORMAL
CREAT UR-MCNC: 31 MG/DL (ref 15–325)
GLUCOSE UR QL STRIP: NEGATIVE
HGB UR QL STRIP: NEGATIVE
KETONES UR QL STRIP: NEGATIVE
LEUKOCYTE ESTERASE UR QL STRIP: NEGATIVE
NITRITE UR QL STRIP: NEGATIVE
PH UR STRIP: 6 [PH] (ref 5–8)
PROT UR QL STRIP: NEGATIVE
PROT UR-MCNC: <7 MG/DL (ref 0–15)
PROT/CREAT UR: NORMAL MG/G{CREAT} (ref 0–0.2)
SP GR UR STRIP: 1.01 (ref 1–1.03)
URN SPEC COLLECT METH UR: NORMAL

## 2023-09-19 PROCEDURE — 82570 ASSAY OF URINE CREATININE: CPT | Performed by: NURSE PRACTITIONER

## 2023-09-19 PROCEDURE — 81003 URINALYSIS AUTO W/O SCOPE: CPT | Performed by: NURSE PRACTITIONER

## 2023-09-22 ENCOUNTER — OFFICE VISIT (OUTPATIENT)
Dept: NEPHROLOGY | Facility: CLINIC | Age: 78
End: 2023-09-22
Payer: MEDICARE

## 2023-09-22 VITALS
BODY MASS INDEX: 43.15 KG/M2 | WEIGHT: 235.88 LBS | OXYGEN SATURATION: 97 % | SYSTOLIC BLOOD PRESSURE: 115 MMHG | HEART RATE: 96 BPM | DIASTOLIC BLOOD PRESSURE: 72 MMHG

## 2023-09-22 DIAGNOSIS — D63.1 ANEMIA IN STAGE 3A CHRONIC KIDNEY DISEASE: ICD-10-CM

## 2023-09-22 DIAGNOSIS — N18.30 TYPE 2 DM WITH CKD STAGE 3 AND HYPERTENSION: ICD-10-CM

## 2023-09-22 DIAGNOSIS — N18.31 ANEMIA IN STAGE 3A CHRONIC KIDNEY DISEASE: ICD-10-CM

## 2023-09-22 DIAGNOSIS — E11.22 TYPE 2 DM WITH CKD STAGE 3 AND HYPERTENSION: ICD-10-CM

## 2023-09-22 DIAGNOSIS — I12.9 TYPE 2 DM WITH CKD STAGE 3 AND HYPERTENSION: ICD-10-CM

## 2023-09-22 DIAGNOSIS — N20.0 KIDNEY STONE: ICD-10-CM

## 2023-09-22 DIAGNOSIS — N18.31 STAGE 3A CHRONIC KIDNEY DISEASE: Primary | ICD-10-CM

## 2023-09-22 PROCEDURE — 3288F PR FALLS RISK ASSESSMENT DOCUMENTED: ICD-10-PCS | Mod: CPTII,S$GLB,, | Performed by: NURSE PRACTITIONER

## 2023-09-22 PROCEDURE — 99999 PR PBB SHADOW E&M-EST. PATIENT-LVL IV: ICD-10-PCS | Mod: PBBFAC,,, | Performed by: NURSE PRACTITIONER

## 2023-09-22 PROCEDURE — 99214 OFFICE O/P EST MOD 30 MIN: CPT | Mod: S$GLB,,, | Performed by: NURSE PRACTITIONER

## 2023-09-22 PROCEDURE — 1126F AMNT PAIN NOTED NONE PRSNT: CPT | Mod: CPTII,S$GLB,, | Performed by: NURSE PRACTITIONER

## 2023-09-22 PROCEDURE — 3078F DIAST BP <80 MM HG: CPT | Mod: CPTII,S$GLB,, | Performed by: NURSE PRACTITIONER

## 2023-09-22 PROCEDURE — 99999 PR PBB SHADOW E&M-EST. PATIENT-LVL IV: CPT | Mod: PBBFAC,,, | Performed by: NURSE PRACTITIONER

## 2023-09-22 PROCEDURE — 1159F MED LIST DOCD IN RCRD: CPT | Mod: CPTII,S$GLB,, | Performed by: NURSE PRACTITIONER

## 2023-09-22 PROCEDURE — 1101F PT FALLS ASSESS-DOCD LE1/YR: CPT | Mod: CPTII,S$GLB,, | Performed by: NURSE PRACTITIONER

## 2023-09-22 PROCEDURE — 1101F PR PT FALLS ASSESS DOC 0-1 FALLS W/OUT INJ PAST YR: ICD-10-PCS | Mod: CPTII,S$GLB,, | Performed by: NURSE PRACTITIONER

## 2023-09-22 PROCEDURE — 3288F FALL RISK ASSESSMENT DOCD: CPT | Mod: CPTII,S$GLB,, | Performed by: NURSE PRACTITIONER

## 2023-09-22 PROCEDURE — 3074F PR MOST RECENT SYSTOLIC BLOOD PRESSURE < 130 MM HG: ICD-10-PCS | Mod: CPTII,S$GLB,, | Performed by: NURSE PRACTITIONER

## 2023-09-22 PROCEDURE — 1159F PR MEDICATION LIST DOCUMENTED IN MEDICAL RECORD: ICD-10-PCS | Mod: CPTII,S$GLB,, | Performed by: NURSE PRACTITIONER

## 2023-09-22 PROCEDURE — 3074F SYST BP LT 130 MM HG: CPT | Mod: CPTII,S$GLB,, | Performed by: NURSE PRACTITIONER

## 2023-09-22 PROCEDURE — 1126F PR PAIN SEVERITY QUANTIFIED, NO PAIN PRESENT: ICD-10-PCS | Mod: CPTII,S$GLB,, | Performed by: NURSE PRACTITIONER

## 2023-09-22 PROCEDURE — 3078F PR MOST RECENT DIASTOLIC BLOOD PRESSURE < 80 MM HG: ICD-10-PCS | Mod: CPTII,S$GLB,, | Performed by: NURSE PRACTITIONER

## 2023-09-22 PROCEDURE — 99214 PR OFFICE/OUTPT VISIT, EST, LEVL IV, 30-39 MIN: ICD-10-PCS | Mod: S$GLB,,, | Performed by: NURSE PRACTITIONER

## 2023-09-22 NOTE — PROGRESS NOTES
Subjective:       Patient ID: Kaylee Romero is a 78 y.o. Black or  female who presents for follow-up evaluation of CKD.    HPI     Patient is new to me. Last seen by Dr. Jones in 21.  Prior pertinent chart reviewed since this is patient's first appointment with me.    Patient presents with  for follow-up of CKD.  Baseline creatinine of ~1-1.4 since about 2019.    Significant other medical problems include T2DM, HTN, HLD, KAMRAN on CPAP, h/o TIA, h/o MI, s/p CABG, HFpEF, OA.      The patient denies taking NSAIDs, herbal supplements, or new antibiotics, recreational drugs, recent episode of dehydration, diarrhea, nausea or vomiting, acute illness, hospitalization or exposure to IV radiocontrast.     Significant family hx includes:   Mother ( at age 87) Heart attack   Father ( at age 76) Heart disease    Stroke    Heart failure    Diabetes    Arrhythmia   Brother No Known Problems   Paternal Grandfather Stroke   Son No Known Problems       Last renal US: 22 , reviewed.  FINDINGS:  Right kidney: The right kidney measures 10.5 cm. Cortical thinning with increased cortical echogenicity and loss of corticomedullary distinction. Resistive index measures 0.70 with decreased perfusion..  No solid mass.  No renal stone. No hydronephrosis.     Left kidney: The left kidney measures 10.3 cm. There is cortical thinning with increased cortical echogenicity and loss of corticomedullary distinction. Resistive index measures 0.72 with decreased perfusion..  No solid mass.  6 mm renal calculus.  No hydronephrosis.     The bladder is partially distended at the time of scanning and has an unremarkable appearance.     Impression:     Bilateral medical renal disease.     6 mm nonobstructing left renal calculus.    Review of Systems   Respiratory:  Positive for shortness of breath (chronic).    Cardiovascular:  Negative for chest pain and leg swelling.   Gastrointestinal:  Negative for  diarrhea, nausea and vomiting.   Genitourinary:  Negative for difficulty urinating, dysuria and hematuria.       Objective:       Blood pressure 115/72, pulse 96, weight 107 kg (235 lb 14.3 oz), last menstrual period 01/09/2001, SpO2 97 %.  Physical Exam  Vitals reviewed.   Constitutional:       General: She is not in acute distress.     Appearance: Normal appearance. She is obese.   HENT:      Head: Normocephalic and atraumatic.   Eyes:      General: No scleral icterus.  Cardiovascular:      Rate and Rhythm: Normal rate and regular rhythm.      Heart sounds: Murmur heard.   Pulmonary:      Effort: Pulmonary effort is normal. No respiratory distress.      Breath sounds: No wheezing or rales.   Musculoskeletal:      Right lower leg: No edema.      Left lower leg: No edema.   Skin:     General: Skin is warm and dry.   Neurological:      Mental Status: She is alert and oriented to person, place, and time.   Psychiatric:         Mood and Affect: Mood normal.         Behavior: Behavior normal.           Lab Results   Component Value Date    CREATININE 1.0 09/19/2023     Prot/Creat Ratio, Urine   Date Value Ref Range Status   09/19/2023 Unable to calculate 0.00 - 0.20 Final   06/10/2022 Unable to calculate 0.00 - 0.20 Final   02/21/2022 Unable to calculate 0.00 - 0.20 Final     Lab Results   Component Value Date     09/19/2023    K 4.1 09/19/2023    CO2 25 09/19/2023     09/19/2023     Lab Results   Component Value Date    PTH 53.1 01/06/2022    CALCIUM 9.8 09/19/2023    PHOS 3.3 09/19/2023     Lab Results   Component Value Date    HGB 11.2 (L) 09/19/2023    WBC 5.10 09/19/2023    HCT 35.5 (L) 09/19/2023      Lab Results   Component Value Date    HGBA1C 6.3 (H) 07/18/2023     09/19/2023    BUN 17 09/19/2023     Lab Results   Component Value Date    LDLCALC 39.4 (L) 09/29/2022         Assessment:       1. Stage 3a chronic kidney disease    2. Anemia in stage 3a chronic kidney disease    3. Type 2 DM with  CKD stage 3 and hypertension    4. Kidney stone        Plan:   CKD stage 3a c eGFR 58.4 mL/min -  - Baseline sCr ~1-1.4 since about 2019. DOREEN in 2021 peak sCr 2.4.   - CKD clinically related T2DM, HTN, vascular disease with h/o DOREEN  - Continue glycemic and BP control, adequate hydration, avoid NSAIDs  - Low risk of progression to ESRD in nonproteinuric stable CKD 3     UPCR Nonproteinuric; continue ARB   Acid-base WNL   Secondary hyperparathyroidism PTH, Ca, phos stable   Anemia Hgb at ckd goal   DM At A1c goal   Lipid Management On statin   ESRD planning [facility]     HTN - Controlled on current regimen    Nephrolithiasis  - left stone on US  - Dietary changes for stones: increased water intake for UOP goal of 2L per day, low Na diet, low animal protein    All questions patient had were answered.  Asked if further questions. None. F/u in clinic 6 months  with labs and urine prior to next visit or sooner if needed.  ER for emergency concerns.    Summary of Plan:  - RTC 6 months with RFP, CBC, UA, UPCR, PTH

## 2023-09-25 ENCOUNTER — PATIENT MESSAGE (OUTPATIENT)
Dept: NEPHROLOGY | Facility: CLINIC | Age: 78
End: 2023-09-25
Payer: MEDICARE

## 2023-09-26 ENCOUNTER — TELEPHONE (OUTPATIENT)
Dept: SPORTS MEDICINE | Facility: CLINIC | Age: 78
End: 2023-09-26
Payer: MEDICARE

## 2023-09-26 NOTE — TELEPHONE ENCOUNTER
Returned call, advised pt we are unable to see her today as we are overbooked. Offered same day appt with another provider; pt declined. Advised pt seek care at ER or urgent care if pain is severe; pt declined. Advised pt we can see her for left hip/thigh pain, but she will need to see surgeon regarding knee pain. Pt elected to be seen on 10/4 at [facility], advised to come early for XR.    Rosita Davis MS, OTC  Clinical Assistant to Dr. Abbey Grant      ----- Message from Arcelia Peterson sent at 9/26/2023  8:07 AM CDT -----  Regarding: Pt called to request a work in appt today for left knee and leg pain and pt states that she was up all night  Same Day Appointment Access Request:    Pt called to request a work in appt today for left knee and leg pain and pt states that she was up all night    Pt can be reached at [phone]

## 2023-09-27 DIAGNOSIS — M25.562 LEFT KNEE PAIN, UNSPECIFIED CHRONICITY: ICD-10-CM

## 2023-09-27 DIAGNOSIS — M25.552 LEFT HIP PAIN: ICD-10-CM

## 2023-09-28 ENCOUNTER — OFFICE VISIT (OUTPATIENT)
Dept: URGENT CARE | Facility: CLINIC | Age: 78
End: 2023-09-28
Payer: MEDICARE

## 2023-09-28 VITALS
DIASTOLIC BLOOD PRESSURE: 81 MMHG | WEIGHT: 235 LBS | HEART RATE: 88 BPM | OXYGEN SATURATION: 95 % | HEIGHT: 62 IN | TEMPERATURE: 98 F | RESPIRATION RATE: 16 BRPM | SYSTOLIC BLOOD PRESSURE: 133 MMHG | BODY MASS INDEX: 43.24 KG/M2

## 2023-09-28 DIAGNOSIS — M47.816 SPONDYLOSIS OF LUMBAR REGION WITHOUT MYELOPATHY OR RADICULOPATHY: ICD-10-CM

## 2023-09-28 DIAGNOSIS — M54.50 LUMBAR BACK PAIN: Primary | ICD-10-CM

## 2023-09-28 DIAGNOSIS — Z96.652 HISTORY OF TOTAL KNEE ARTHROPLASTY, LEFT: ICD-10-CM

## 2023-09-28 DIAGNOSIS — M25.562 CHRONIC PAIN OF LEFT KNEE: ICD-10-CM

## 2023-09-28 DIAGNOSIS — M25.552 LEFT HIP PAIN: ICD-10-CM

## 2023-09-28 DIAGNOSIS — G89.29 CHRONIC PAIN OF LEFT KNEE: ICD-10-CM

## 2023-09-28 DIAGNOSIS — N18.31 STAGE 3A CHRONIC KIDNEY DISEASE: ICD-10-CM

## 2023-09-28 PROCEDURE — 99214 OFFICE O/P EST MOD 30 MIN: CPT | Mod: 25,S$GLB,, | Performed by: FAMILY MEDICINE

## 2023-09-28 PROCEDURE — 72100 X-RAY EXAM L-S SPINE 2/3 VWS: CPT | Mod: S$GLB,,, | Performed by: RADIOLOGY

## 2023-09-28 PROCEDURE — 73502 X-RAY EXAM HIP UNI 2-3 VIEWS: CPT | Mod: LT,S$GLB,, | Performed by: RADIOLOGY

## 2023-09-28 PROCEDURE — 73502 XR HIP WITH PELVIS WHEN PERFORMED, 2 OR 3 VIEWS LEFT: ICD-10-PCS | Mod: LT,S$GLB,, | Performed by: RADIOLOGY

## 2023-09-28 PROCEDURE — 96372 THER/PROPH/DIAG INJ SC/IM: CPT | Mod: S$GLB,,, | Performed by: FAMILY MEDICINE

## 2023-09-28 PROCEDURE — 99214 PR OFFICE/OUTPT VISIT, EST, LEVL IV, 30-39 MIN: ICD-10-PCS | Mod: 25,S$GLB,, | Performed by: FAMILY MEDICINE

## 2023-09-28 PROCEDURE — 96372 PR INJECTION,THERAP/PROPH/DIAG2ST, IM OR SUBCUT: ICD-10-PCS | Mod: S$GLB,,, | Performed by: FAMILY MEDICINE

## 2023-09-28 PROCEDURE — 73562 XR KNEE 3 VIEW LEFT: ICD-10-PCS | Mod: LT,S$GLB,, | Performed by: RADIOLOGY

## 2023-09-28 PROCEDURE — 72100 XR LUMBAR SPINE 2 OR 3 VIEWS: ICD-10-PCS | Mod: S$GLB,,, | Performed by: RADIOLOGY

## 2023-09-28 PROCEDURE — 73562 X-RAY EXAM OF KNEE 3: CPT | Mod: LT,S$GLB,, | Performed by: RADIOLOGY

## 2023-09-28 RX ORDER — DEXAMETHASONE SODIUM PHOSPHATE 4 MG/ML
8 INJECTION, SOLUTION INTRA-ARTICULAR; INTRALESIONAL; INTRAMUSCULAR; INTRAVENOUS; SOFT TISSUE
Status: COMPLETED | OUTPATIENT
Start: 2023-09-28 | End: 2023-09-28

## 2023-09-28 RX ADMIN — DEXAMETHASONE SODIUM PHOSPHATE 8 MG: 4 INJECTION, SOLUTION INTRA-ARTICULAR; INTRALESIONAL; INTRAMUSCULAR; INTRAVENOUS; SOFT TISSUE at 06:09

## 2023-09-28 NOTE — PATIENT INSTRUCTIONS
You received a steroid shot today  -this can elevate your blood pressure, elevate blood sugar, cause water weight gain, nervous energy, redness to the face and dimpling of the skin where the shot was administered.     I have placed orthopedic referral for back/ spine  Call to schedule an appointment: [phone]      Keep orthopedic appointment on 10/4/23 for reevaluation of left hip    Follow up with primary care provider within 5 days.    Seek immediate care in the emergency room in the event of severe pain, chest pain, respiratory distress, leg/calf swelling.

## 2023-09-28 NOTE — PROGRESS NOTES
"Subjective:      Patient ID: Kaylee Romero is a 78 y.o. female.    Vitals:  height is 5' 2" (1.575 m) and weight is 106.6 kg (235 lb). Her temporal temperature is 98.3 °F (36.8 °C). Her blood pressure is 133/81 and her pulse is 88. Her respiration is 16 and oxygen saturation is 95%.     Chief Complaint: Hip Pain (Left hip)    Patient reports left hip and leg pain since September 18th 2023.Patient took Oxychodone for her pain and Diclofenac gel.Patient reports no known injury.    Hip Pain   Incident onset: 10 days. There was no injury mechanism. The pain is present in the left hip (and left leg). The pain is at a severity of 7/10. The pain has been Constant since onset. Associated symptoms include an inability to bear weight. Pertinent negatives include no numbness or tingling. The symptoms are aggravated by movement and weight bearing. Treatments tried: Diclofenac gel ;Oxychodone. The treatment provided mild relief.       Musculoskeletal:  Positive for pain.   Neurological:  Negative for numbness and tingling.      Objective:     Vitals:    09/28/23 1823   BP: 133/81   BP Location: Left arm   Patient Position: Sitting   BP Method: Medium (Automatic)   Pulse: 88   Resp: 16   Temp: 98.3 °F (36.8 °C)   TempSrc: Temporal   SpO2: 95%   Weight: 106.6 kg (235 lb)   Height: 5' 2" (1.575 m)      Physical Exam   Constitutional: She is oriented to person, place, and time. obesity  Pulmonary/Chest: Effort normal.   Musculoskeletal:      Comments: Extremities:  No calf swelling, no edema, peripheral pulses intact    Musculoskeletal:  Antalgic gait and station.  No misalignment, no asymmetry, no crepitation, no defects, no masses, no effusions of pelvis, knee or spine. There is tenderness to palpation of left anterior hip and left paralumbar region. No decreased range of motion but painful range of motion in all directions of hip. Normal range of motion of left knee and spine. No instability, no atrophy or abnormal strength " or tone in the spine, pelvis or extremities.   Neurological: She is alert and oriented to person, place, and time. No sensory deficit.   Psychiatric: Thought content normal.       Assessment:     1. Lumbar back pain    2. Left hip pain    3. History of total knee arthroplasty, left    4. Chronic pain of left knee    5. Stage 3a chronic kidney disease    6. Spondylosis of lumbar region without myelopathy or radiculopathy        Plan:       Lumbar back pain  -     XR LUMBAR SPINE 2 OR 3 VIEWS; Future; Expected date: 09/28/2023  -     dexAMETHasone injection 8 mg    2. Left hip pain  -     X-Ray Hip 2 or 3 views Left (with Pelvis when performed); Future; Expected date: 09/28/2023  -     dexAMETHasone injection 8 mg    3. History of total knee arthroplasty, left  -     XR KNEE 3 VIEW LEFT; Future; Expected date: 09/28/2023  Follow up with orthopedic doctor, preferably performing physician.    4. Chronic pain of left knee  -     XR KNEE 3 VIEW LEFT; Future; Expected date: 09/28/2023    5. Stage 3a chronic kidney disease  Avoid NSAIDs if at all possible    6. Spondylosis of lumbar region without myelopathy or radiculopathy  -     Ambulatory referral/consult to Orthopedics      Patient Instructions   You received a steroid shot today  -this can elevate your blood pressure, elevate blood sugar, cause water weight gain, nervous energy, redness to the face and dimpling of the skin where the shot was administered.     I have placed orthopedic referral for back/ spine  Call to schedule an appointment: [phone]      Keep orthopedic appointment on 10/4/23 for reevaluation of left hip    Follow up with primary care provider within 5 days.    Seek immediate care in the emergency room in the event of severe pain, chest pain, respiratory distress, leg/calf swelling.

## 2023-09-29 ENCOUNTER — PATIENT MESSAGE (OUTPATIENT)
Dept: PRIMARY CARE CLINIC | Facility: CLINIC | Age: 78
End: 2023-09-29
Payer: MEDICARE

## 2023-09-30 ENCOUNTER — OFFICE VISIT (OUTPATIENT)
Dept: URGENT CARE | Facility: CLINIC | Age: 78
End: 2023-09-30
Payer: MEDICARE

## 2023-09-30 VITALS
HEART RATE: 97 BPM | BODY MASS INDEX: 43.24 KG/M2 | TEMPERATURE: 98 F | HEIGHT: 62 IN | DIASTOLIC BLOOD PRESSURE: 68 MMHG | WEIGHT: 235 LBS | SYSTOLIC BLOOD PRESSURE: 101 MMHG | RESPIRATION RATE: 16 BRPM | OXYGEN SATURATION: 99 %

## 2023-09-30 DIAGNOSIS — M79.605 LEG PAIN, ANTERIOR, LEFT: Primary | ICD-10-CM

## 2023-09-30 PROCEDURE — 99213 PR OFFICE/OUTPT VISIT, EST, LEVL III, 20-29 MIN: ICD-10-PCS | Mod: 25,S$GLB,, | Performed by: FAMILY MEDICINE

## 2023-09-30 PROCEDURE — 96372 PR INJECTION,THERAP/PROPH/DIAG2ST, IM OR SUBCUT: ICD-10-PCS | Mod: S$GLB,,, | Performed by: FAMILY MEDICINE

## 2023-09-30 PROCEDURE — 99213 OFFICE O/P EST LOW 20 MIN: CPT | Mod: 25,S$GLB,, | Performed by: FAMILY MEDICINE

## 2023-09-30 PROCEDURE — 96372 THER/PROPH/DIAG INJ SC/IM: CPT | Mod: S$GLB,,, | Performed by: FAMILY MEDICINE

## 2023-09-30 RX ORDER — OXYCODONE AND ACETAMINOPHEN 5; 325 MG/1; MG/1
1 TABLET ORAL EVERY 8 HOURS PRN
Qty: 10 TABLET | Refills: 0 | Status: SHIPPED | OUTPATIENT
Start: 2023-09-30 | End: 2023-11-08

## 2023-09-30 RX ORDER — KETOROLAC TROMETHAMINE 30 MG/ML
30 INJECTION, SOLUTION INTRAMUSCULAR; INTRAVENOUS
Status: COMPLETED | OUTPATIENT
Start: 2023-09-30 | End: 2023-09-30

## 2023-09-30 RX ORDER — OXYCODONE AND ACETAMINOPHEN 5; 325 MG/1; MG/1
1 TABLET ORAL EVERY 8 HOURS PRN
Qty: 10 TABLET | Refills: 0 | Status: SHIPPED | OUTPATIENT
Start: 2023-09-30 | End: 2023-09-30

## 2023-09-30 RX ADMIN — KETOROLAC TROMETHAMINE 30 MG: 30 INJECTION, SOLUTION INTRAMUSCULAR; INTRAVENOUS at 04:09

## 2023-09-30 NOTE — PROGRESS NOTES
"Subjective:      Patient ID: Kaylee Romero is a 78 y.o. female.    Vitals:  height is 5' 2" (1.575 m) and weight is 106.6 kg (235 lb 0.2 oz). Her oral temperature is 98 °F (36.7 °C). Her blood pressure is 101/68 and her pulse is 97. Her respiration is 16 and oxygen saturation is 99%.     Chief Complaint: Pain (Located in the left upper thigh)    Patient reports to the clinic with the compliant of left leg pain on and off for last 2 weeks, she reports with taking tylenol, diclofenac, muscle relaxer's, and a tens unit for her pain and reports with no relief. Patient states that her pain is not in the knee's. Denies any back pain, paresthesias, weakness, saddle anesthesia or Bowel/Bladder dysfunction, fever.  Denies any injury.       Pain  This is a recurrent problem. The current episode started in the past 7 days. The problem occurs intermittently. The problem has been gradually worsening. Associated symptoms include weakness. The symptoms are aggravated by walking, standing and bending. She has tried acetaminophen, position changes, relaxation, immobilization and NSAIDs for the symptoms. The treatment provided no relief.     ROS   Objective:     Physical Exam   Constitutional: She is oriented to person, place, and time. She appears well-developed. She is cooperative.   HENT:   Head: Normocephalic and atraumatic.   Ears:   Right Ear: Hearing and external ear normal. impacted cerumen  Left Ear: Hearing and external ear normal. impacted cerumen  Nose: Nose normal. No mucosal edema, rhinorrhea, nasal deformity or congestion. No epistaxis. Right sinus exhibits no maxillary sinus tenderness and no frontal sinus tenderness. Left sinus exhibits no maxillary sinus tenderness and no frontal sinus tenderness.   Mouth/Throat: Uvula is midline, oropharynx is clear and moist and mucous membranes are normal. No trismus in the jaw. Normal dentition. No uvula swelling. No oropharyngeal exudate or posterior oropharyngeal " erythema.   Eyes: Conjunctivae and lids are normal.   Neck: Trachea normal and phonation normal. Neck supple.   Cardiovascular: Normal rate, regular rhythm, normal heart sounds and normal pulses.   No murmur heard.  Pulmonary/Chest: Effort normal and breath sounds normal. No stridor. No respiratory distress. She has no wheezes. She has no rhonchi. She has no rales.   Abdominal: Normal appearance and bowel sounds are normal. She exhibits no distension. Soft. There is no abdominal tenderness. There is no left CVA tenderness and no right CVA tenderness.   Musculoskeletal:      Comments:   Left lower extremity:  Positive mild tenderness to anteromedial aspect of thigh.  No redness, induration, bruise, abrasion.    Hip and knee exam unremarkable.    No sensory/motor deficit.  No calf tenderness, redness, swelling.   Neurological: She is alert and oriented to person, place, and time. She exhibits normal muscle tone.   Skin: Skin is warm, dry and intact.   Psychiatric: Her speech is normal and behavior is normal. Judgment and thought content normal.   Nursing note and vitals reviewed.      Assessment:     1. Leg pain, anterior, left        Plan:   Discussed exam findings/diagnosis/plan with patient in clinic. Advised to f/u with PCP within 2-5 days. ER precautions given if symptoms get any worse. All questions answered. Patient verbally understood and agreed with treatment plan.     Leg pain, anterior, left  -     US Lower Extremity Veins Left; Future; Expected date: 09/30/2023    Other orders  -     ketorolac injection 30 mg  -     Discontinue: oxyCODONE-acetaminophen (PERCOCET) 5-325 mg per tablet; Take 1 tablet by mouth every 8 (eight) hours as needed for Pain.  Dispense: 10 tablet; Refill: 0  -     oxyCODONE-acetaminophen (PERCOCET) 5-325 mg per tablet; Take 1 tablet by mouth every 8 (eight) hours as needed for Pain.  Dispense: 10 tablet; Refill: 0

## 2023-10-02 ENCOUNTER — PATIENT MESSAGE (OUTPATIENT)
Dept: ORTHOPEDICS | Facility: CLINIC | Age: 78
End: 2023-10-02
Payer: MEDICARE

## 2023-10-02 DIAGNOSIS — Z96.652 PRESENCE OF ARTIFICIAL KNEE JOINT, LEFT: Primary | ICD-10-CM

## 2023-10-02 NOTE — PROGRESS NOTES
DATE: 10/5/2023  PATIENT: Kaylee Romero    Supervising Physician: Winston Mccartney M.D.    CHIEF COMPLAINT: low back pain    HISTORY:  Kaylee Romero is a 78 y.o. female with pmhx of DM2 (A1c:), CKD3, CABG on plavix here for initial evaluation of low back and left anterior leg pain (Back - 2, Leg - 9).  The pain in the left leg is what bothers her most.  The pain has been present since 23. Denies specific injury. The patient describes the pain as sharp and constant.  The pain is worse with any movement and improved by nothing. There is associated numbness and tingling. There is subjective weakness. She presents today in a wheelchair Prior treatments have included tylenol, oxycodone and flexeril, but no ASHELY or surgery. MRI hip and lumbar ordered by Dr. Ward.     The patient denies myelopathic symptoms such as handwriting changes or difficulty with buttons/coins/keys. Denies perineal paresthesias, bowel/bladder dysfunction.    PAST MEDICAL/SURGICAL HISTORY:  Past Medical History:   Diagnosis Date    Age-related osteoporosis without current pathological fracture 2019    Anemia     Cataract     CKD (chronic kidney disease) stage 3, GFR 30-59 ml/min     Coronary artery disease     Dry mouth     History of TIA (transient ischemic attack) 2018    Hyperlipidemia 2014    Hypertension     Hypertensive heart disease with diastolic heart failure 2012    Morbid obesity with body mass index (BMI) of 40.0 or higher 2016    KAMRAN (obstructive sleep apnea)     KAMRAN on CPAP 2016    Osteoporosis without current pathological fracture 2018    Physical deconditioning 2018    Status post total left knee replacement 2017    Type 2 diabetes mellitus with stage 3 chronic kidney disease, without long-term current use of insulin 2019     Past Surgical History:   Procedure Laterality Date    ankle surgery (l) Left     APPENDECTOMY       SECTION       CORONARY ARTERY BYPASS GRAFT  09/2019    x 2    CORONARY ARTERY BYPASS GRAFT (CABG) N/A 08/12/2019    Procedure: CORONARY ARTERY BYPASS GRAFT (CABG)X3;  Surgeon: Peterson Ventura MD;  Location: [address];  Service: Cardiovascular;  Laterality: N/A;    CORONARY BYPASS GRAFT ANGIOGRAPHY  10/18/2021    Procedure: Bypass graft study;  Surgeon: Jamar Haddad MD;  Location: [facility];  Service: Cardiology;;    DILATION AND CURETTAGE OF UTERUS      ENDOSCOPIC HARVEST OF VEIN Left 08/12/2019    Procedure: SURGICAL PROCUREMENT, VEIN, ENDOSCOPIC;  Surgeon: Peterson Ventura MD;  Location: [address];  Service: Cardiovascular;  Laterality: Left;  Vein [place] Start: 0843; End: 1054   Vein Prep Start: 1055; End: 1145    JOINT REPLACEMENT Left     knee replacement    KNEE ARTHROSCOPY W/ DEBRIDEMENT      KNEE SURGERY Left 05/01/2017    TKR    LEFT HEART CATHETERIZATION Left 07/09/2019    Procedure: Left heart cath;  Surgeon: Ronak Gibbons MD;  Location: [facility];  Service: Cardiology;  Laterality: Left;    LEFT HEART CATHETERIZATION Left 10/18/2021    Procedure: Left heart cath;  Surgeon: Jamar Haddad MD;  Location: [facility];  Service: Cardiology;  Laterality: Left;    TOTAL KNEE ARTHROPLASTY Right 3/9/2023    Procedure: ARTHROPLASTY, KNEE, TOTAL:RIGHT;  Surgeon: Peterson Sharma MD;  Location: [address];  Service: Orthopedics;  Laterality: Right;       Medications:   Current Outpatient Medications on File Prior to Visit   Medication Sig Dispense Refill    acetaminophen (TYLENOL) 500 MG tablet Take 500 mg by mouth 2 (two) times daily as needed for Pain.      acetaminophen (TYLENOL) 500 MG tablet Take 1 tablet (500 mg total) by mouth every 6 (six) hours as needed for Pain. 20 tablet 0    alendronate (FOSAMAX) 70 MG tablet Take 1 tablet (70 mg total) by mouth every 7 days. 12 tablet 0    amLODIPine (NORVASC) 10 MG tablet TAKE 1 TABLET(10 MG) BY MOUTH EVERY DAY 90 tablet 2     "ammonium lactate (LAC-HYDRIN) 12 % lotion Apply topically as needed for Dry Skin. On the feet and toenails. 225 g 6    amoxicillin (AMOXIL) 500 MG capsule TAKE ALL 4 CAPSULES 1 HOUR PRIOR TO APPOINTMENT      atorvastatin (LIPITOR) 80 MG tablet Take 1 tablet (80 mg total) by mouth once daily. 90 tablet 0    BD BOO 2ND GEN PEN NEEDLE 32 gauge x 5/32" Ndle To injection daily 100 each 8    blood sugar diagnostic Strp 1 strip by Misc.(Non-Drug; Combo Route) route once daily. 100 strip 3    blood-glucose meter,continuous (DEXCOM G7 ) Misc To use with sensor 1 each 0    blood-glucose sensor (DEXCOM G7 SENSOR) Areli To change every 10 days 3 each 3    carvediloL (COREG) 25 MG tablet TAKE 1 TABLET(25 MG) BY MOUTH TWICE DAILY WITH MEALS 180 tablet 2    clopidogreL (PLAVIX) 75 mg tablet Take 1 tablet (75 mg total) by mouth once daily. 90 tablet 3    COD LIVER OIL ORAL TAKE 2 CAPSULES BY MOUTH EVERY MORNING AND 1 CAPSULE EVERY EVENING      cyclobenzaprine (FLEXERIL) 10 MG tablet Take 1/2 tablet by mouth three times a day as needed for muscle spasms 90 tablet 1    cyclobenzaprine (FLEXERIL) 10 MG tablet Take 1 tablet (10 mg total) by mouth 3 (three) times daily as needed for Muscle spasms. 30 tablet 0    diclofenac sodium (VOLTAREN) 1 % Gel Apply 2 g topically 4 (four) times daily as needed. To painful area on the feet. (Patient taking differently: Apply 2 g topically 4 (four) times daily as needed. To painful area on the feet and knee) 500 g 5    diphenoxylate-atropine 2.5-0.025 mg (LOMOTIL) 2.5-0.025 mg per tablet TAKE 1 TABLET BY MOUTH FOUR TIMES DAILY AS NEEDED 30 tablet 3    dulaglutide (TRULICITY) 4.5 mg/0.5 mL pen injector Inject 4.5 mg into the skin every 7 days. (Patient taking differently: Inject 4.5 mg into the skin every Sunday.) 2 mL 3    ferrous sulfate (FEROSUL) 325 mg (65 mg iron) Tab tablet TAKE 1 TABLET BY MOUTH DAILY 90 tablet 0    furosemide (LASIX) 20 MG tablet TAKE 1 TABLET BY MOUTH ON SATURDAY, " SUNDAY, MONDAY, WEDNESDAY AND FRIDAY 30 tablet 3    glipiZIDE (GLUCOTROL) 5 MG tablet Take 2 tablets (10 mg total) by mouth 2 (two) times daily with meals. 360 tablet 3    glucagon (GVOKE HYPOPEN 2-PACK) 1 mg/0.2 mL AtIn Inject 1 Package into the skin as needed. 2 Syringe 0    insulin glargine,hum.rec.anlog (BASAGLAR KWIKPEN U-100 INSULIN SUBQ) Inject 22 Units into the skin every evening.      irbesartan (AVAPRO) 300 MG tablet TAKE 1 TABLET(300 MG) BY MOUTH EVERY EVENING 90 tablet 2    isosorbide mononitrate (IMDUR) 30 MG 24 hr tablet TAKE 2 TABLETS(60 MG) BY MOUTH EVERY  tablet 3    lancets Misc 1 lancet by Misc.(Non-Drug; Combo Route) route once daily. 100 each 3    multivitamin (THERAGRAN) per tablet Take 1 tablet by mouth once daily.      oxyCODONE (ROXICODONE) 5 MG immediate release tablet Take 1 tablet (5 mg total) by mouth every 4 (four) hours as needed for Pain. 28 tablet 0    oxyCODONE-acetaminophen (PERCOCET) 5-325 mg per tablet Take 1 tablet by mouth every 8 (eight) hours as needed for Pain. 10 tablet 0    vitamin D (VITAMIN D3) 1000 units Tab Take 1,000 Units by mouth twice a week. Monday AND THURSDAYS ONLY      aspirin (ECOTRIN) 81 MG EC tablet Take 1 tablet (81 mg total) by mouth 2 (two) times a day. 60 tablet 0    nitroGLYCERIN (NITROSTAT) 0.4 MG SL tablet Place 1 tablet (0.4 mg total) under the tongue every 5 (five) minutes as needed for Chest pain. 25 tablet 6     Current Facility-Administered Medications on File Prior to Visit   Medication Dose Route Frequency Provider Last Rate Last Admin    hyaluronate sodium, stabilized (MONOVISC) Syrg   Intra-articular 1 time in Clinic/HOD Peterson Sharma MD           Social History:   Social History     Socioeconomic History    Marital status:     Number of children: 1   Tobacco Use    Smoking status: Never    Smokeless tobacco: Never   Substance and Sexual Activity    Alcohol use: Yes     Alcohol/week: 1.0 standard drink of alcohol     Types:  1 Shots of liquor per week     Comment: rare    Drug use: No    Sexual activity: Yes     Partners: Male     Birth control/protection: Post-menopausal   Social History Narrative     with a son living locally.  at [employer], Retired 5/18.     Social Determinants of Health     Financial Resource Strain: Low Risk  (9/29/2023)    Overall Financial Resource Strain (CARDIA)     Difficulty of Paying Living Expenses: Not hard at all   Food Insecurity: No Food Insecurity (9/29/2023)    Hunger Vital Sign     Worried About Running Out of Food in the Last Year: Never true     Ran Out of Food in the Last Year: Never true   Transportation Needs: No Transportation Needs (9/29/2023)    PRAPARE - Transportation     Lack of Transportation (Medical): No     Lack of Transportation (Non-Medical): No   Physical Activity: Sufficiently Active (9/29/2023)    Exercise Vital Sign     Days of Exercise per Week: 3 days     Minutes of Exercise per Session: 60 min   Stress: No Stress Concern Present (9/29/2023)    [employer] [place] [employer] - Occupational Stress Questionnaire     Feeling of Stress : Not at all   Social Connections: Moderately Integrated (9/29/2023)    Social Connection and Isolation Panel [NHANES]     Frequency of Communication with Friends and Family: More than three times a week     Frequency of Social Gatherings with Friends and Family: Once a week     Attends Mu-ism Services: More than 4 times per year     Active Member of Clubs or Organizations: No     Attends Club or Organization Meetings: Never     Marital Status:    Housing Stability: Low Risk  (9/29/2023)    Housing Stability Vital Sign     Unable to Pay for Housing in the Last Year: No     Number of Places Lived in the Last Year: 1     Unstable Housing in the Last Year: No       REVIEW OF SYSTEMS:  Constitution: Negative. Negative for chills, fever and night sweats.   Cardiovascular: Negative for chest pain and  "syncope.   Respiratory: Negative for cough and shortness of breath.   Gastrointestinal: See HPI. Negative for nausea/vomiting. Negative for abdominal pain.  Genitourinary: See HPI. Negative for discoloration or dysuria.  Skin: Negative for dry skin, itching and rash.   Hematologic/Lymphatic: Negative for bleeding problem. Does not bruise/bleed easily.   Musculoskeletal: Negative for falls and muscle weakness.   Neurological: See HPI. No seizures.   Endocrine: Negative for polydipsia, polyphagia and polyuria.   Allergic/Immunologic: Negative for hives and persistent infections.     EXAM:  Ht 5' 2" (1.575 m)   Wt 106.6 kg (235 lb)   LMP 01/09/2001 (Approximate)   BMI 42.98 kg/m²     General: The patient is a 78 y.o. female in no apparent distress, the patient is oriented to person, place and time.  Psych: Normal mood and affect  HEENT: Vision grossly intact, hearing intact to the spoken word.  Lungs: Respirations unlabored.  Gait: antlgic station and gait, no difficulty with toe or heel walk.   Skin: Dorsal lumbar skin negative for rashes, lesions, hairy patches and surgical scars. There is mild lumbar tenderness to palpation.  Range of motion: Lumbar range of motion is acceptable.  Spinal Balance: Global saggital and coronal spinal balance acceptable, not significant for scoliosis and kyphosis.  Musculoskeletal: No pain with the range of motion of the bilateral hips. No trochanteric tenderness to palpation.  Vascular: Bilateral lower extremities warm and well perfused, dorsalis pedis pulses 2+ bilaterally.  Neurological: decreased strength and tone in all major motor groups in the bilateral lower extremities. decreased sensation to light touch in the L2-S1 dermatomes bilaterally.  Deep tendon reflexes symmetric 2+ in the bilateral lower extremities.  Negative Babinski bilaterally. Straight leg raise positive bilaterally.    IMAGING:      Today I personally reviewed AP, Lat and Flex/Ex  upright L-spine films that " demonstrate mild anterolisthesis of L4 in relation to L5. Moderate DDD throughout.       Body mass index is 42.98 kg/m².    Hemoglobin A1C   Date Value Ref Range Status   07/18/2023 6.3 (H) 4.0 - 5.6 % Final     Comment:     ADA Screening Guidelines:  5.7-6.4%  Consistent with prediabetes  >or=6.5%  Consistent with diabetes    High levels of fetal hemoglobin interfere with the HbA1C  assay. Heterozygous hemoglobin variants (HbS, HgC, etc)do  not significantly interfere with this assay.   However, presence of multiple variants may affect accuracy.     02/16/2023 5.9 (H) 4.0 - 5.6 % Final     Comment:     ADA Screening Guidelines:  5.7-6.4%  Consistent with prediabetes  >or=6.5%  Consistent with diabetes    High levels of fetal hemoglobin interfere with the HbA1C  assay. Heterozygous hemoglobin variants (HbS, HgC, etc)do  not significantly interfere with this assay.   However, presence of multiple variants may affect accuracy.     09/29/2022 6.2 (H) 4.0 - 5.6 % Final     Comment:     ADA Screening Guidelines:  5.7-6.4%  Consistent with prediabetes  >or=6.5%  Consistent with diabetes    High levels of fetal hemoglobin interfere with the HbA1C  assay. Heterozygous hemoglobin variants (HbS, HgC, etc)do  not significantly interfere with this assay.   However, presence of multiple variants may affect accuracy.             ASSESSMENT/PLAN:    Kaylee was seen today for low-back pain and hip pain.    Diagnoses and all orders for this visit:    Lumbar radiculopathy, chronic  -     gabapentin (NEURONTIN) 300 MG capsule; Take 1 capsule (300 mg total) by mouth 3 (three) times daily.        Today we discussed at length all of the different treatment options including anti-inflammatories, acetaminophen, rest, ice, heat, physical therapy including strengthening and stretching exercises, home exercises, ROM, aerobic conditioning, aqua therapy, other modalities including ultrasound, massage, and dry needling, epidural steroid  injections and finally surgical intervention.  I will call with MRI results.

## 2023-10-03 ENCOUNTER — TELEPHONE (OUTPATIENT)
Dept: ORTHOPEDICS | Facility: CLINIC | Age: 78
End: 2023-10-03
Payer: MEDICARE

## 2023-10-03 DIAGNOSIS — M51.36 DDD (DEGENERATIVE DISC DISEASE), LUMBAR: Primary | ICD-10-CM

## 2023-10-03 NOTE — TELEPHONE ENCOUNTER
Spoke to patient  in regards to x-ray appointment. Stated to Tl Nick that her appointment is scheduled at the [facility] for 1:30 pm. Stated thank you. Thanks.

## 2023-10-04 ENCOUNTER — OFFICE VISIT (OUTPATIENT)
Dept: ORTHOPEDICS | Facility: CLINIC | Age: 78
End: 2023-10-04
Payer: MEDICARE

## 2023-10-04 VITALS — SYSTOLIC BLOOD PRESSURE: 117 MMHG | HEART RATE: 90 BPM | DIASTOLIC BLOOD PRESSURE: 71 MMHG

## 2023-10-04 DIAGNOSIS — M79.605 LEFT LEG PAIN: Primary | ICD-10-CM

## 2023-10-04 DIAGNOSIS — M54.16 LUMBAR RADICULOPATHY, CHRONIC: ICD-10-CM

## 2023-10-04 DIAGNOSIS — M51.36 DDD (DEGENERATIVE DISC DISEASE), LUMBAR: ICD-10-CM

## 2023-10-04 DIAGNOSIS — M25.552 LEFT HIP PAIN: ICD-10-CM

## 2023-10-04 DIAGNOSIS — Z96.652 HISTORY OF TOTAL KNEE ARTHROPLASTY, LEFT: ICD-10-CM

## 2023-10-04 PROCEDURE — 3074F PR MOST RECENT SYSTOLIC BLOOD PRESSURE < 130 MM HG: ICD-10-PCS | Mod: CPTII,S$GLB,, | Performed by: STUDENT IN AN ORGANIZED HEALTH CARE EDUCATION/TRAINING PROGRAM

## 2023-10-04 PROCEDURE — 99215 OFFICE O/P EST HI 40 MIN: CPT | Mod: S$GLB,,, | Performed by: STUDENT IN AN ORGANIZED HEALTH CARE EDUCATION/TRAINING PROGRAM

## 2023-10-04 PROCEDURE — 1125F PR PAIN SEVERITY QUANTIFIED, PAIN PRESENT: ICD-10-PCS | Mod: CPTII,S$GLB,, | Performed by: STUDENT IN AN ORGANIZED HEALTH CARE EDUCATION/TRAINING PROGRAM

## 2023-10-04 PROCEDURE — 1160F PR REVIEW ALL MEDS BY PRESCRIBER/CLIN PHARMACIST DOCUMENTED: ICD-10-PCS | Mod: CPTII,S$GLB,, | Performed by: STUDENT IN AN ORGANIZED HEALTH CARE EDUCATION/TRAINING PROGRAM

## 2023-10-04 PROCEDURE — 3288F FALL RISK ASSESSMENT DOCD: CPT | Mod: CPTII,S$GLB,, | Performed by: STUDENT IN AN ORGANIZED HEALTH CARE EDUCATION/TRAINING PROGRAM

## 2023-10-04 PROCEDURE — 3288F PR FALLS RISK ASSESSMENT DOCUMENTED: ICD-10-PCS | Mod: CPTII,S$GLB,, | Performed by: STUDENT IN AN ORGANIZED HEALTH CARE EDUCATION/TRAINING PROGRAM

## 2023-10-04 PROCEDURE — 99215 PR OFFICE/OUTPT VISIT, EST, LEVL V, 40-54 MIN: ICD-10-PCS | Mod: S$GLB,,, | Performed by: STUDENT IN AN ORGANIZED HEALTH CARE EDUCATION/TRAINING PROGRAM

## 2023-10-04 PROCEDURE — 1159F PR MEDICATION LIST DOCUMENTED IN MEDICAL RECORD: ICD-10-PCS | Mod: CPTII,S$GLB,, | Performed by: STUDENT IN AN ORGANIZED HEALTH CARE EDUCATION/TRAINING PROGRAM

## 2023-10-04 PROCEDURE — 3074F SYST BP LT 130 MM HG: CPT | Mod: CPTII,S$GLB,, | Performed by: STUDENT IN AN ORGANIZED HEALTH CARE EDUCATION/TRAINING PROGRAM

## 2023-10-04 PROCEDURE — 1159F MED LIST DOCD IN RCRD: CPT | Mod: CPTII,S$GLB,, | Performed by: STUDENT IN AN ORGANIZED HEALTH CARE EDUCATION/TRAINING PROGRAM

## 2023-10-04 PROCEDURE — 1160F RVW MEDS BY RX/DR IN RCRD: CPT | Mod: CPTII,S$GLB,, | Performed by: STUDENT IN AN ORGANIZED HEALTH CARE EDUCATION/TRAINING PROGRAM

## 2023-10-04 PROCEDURE — 99999 PR PBB SHADOW E&M-EST. PATIENT-LVL III: CPT | Mod: PBBFAC,,, | Performed by: STUDENT IN AN ORGANIZED HEALTH CARE EDUCATION/TRAINING PROGRAM

## 2023-10-04 PROCEDURE — 1101F PT FALLS ASSESS-DOCD LE1/YR: CPT | Mod: CPTII,S$GLB,, | Performed by: STUDENT IN AN ORGANIZED HEALTH CARE EDUCATION/TRAINING PROGRAM

## 2023-10-04 PROCEDURE — 3078F DIAST BP <80 MM HG: CPT | Mod: CPTII,S$GLB,, | Performed by: STUDENT IN AN ORGANIZED HEALTH CARE EDUCATION/TRAINING PROGRAM

## 2023-10-04 PROCEDURE — 3078F PR MOST RECENT DIASTOLIC BLOOD PRESSURE < 80 MM HG: ICD-10-PCS | Mod: CPTII,S$GLB,, | Performed by: STUDENT IN AN ORGANIZED HEALTH CARE EDUCATION/TRAINING PROGRAM

## 2023-10-04 PROCEDURE — 99999 PR PBB SHADOW E&M-EST. PATIENT-LVL III: ICD-10-PCS | Mod: PBBFAC,,, | Performed by: STUDENT IN AN ORGANIZED HEALTH CARE EDUCATION/TRAINING PROGRAM

## 2023-10-04 PROCEDURE — 1125F AMNT PAIN NOTED PAIN PRSNT: CPT | Mod: CPTII,S$GLB,, | Performed by: STUDENT IN AN ORGANIZED HEALTH CARE EDUCATION/TRAINING PROGRAM

## 2023-10-04 PROCEDURE — 1101F PR PT FALLS ASSESS DOC 0-1 FALLS W/OUT INJ PAST YR: ICD-10-PCS | Mod: CPTII,S$GLB,, | Performed by: STUDENT IN AN ORGANIZED HEALTH CARE EDUCATION/TRAINING PROGRAM

## 2023-10-04 RX ORDER — CYCLOBENZAPRINE HCL 10 MG
10 TABLET ORAL 3 TIMES DAILY PRN
Qty: 30 TABLET | Refills: 0 | Status: SHIPPED | OUTPATIENT
Start: 2023-10-04 | End: 2023-10-14

## 2023-10-04 RX ORDER — OXYCODONE HYDROCHLORIDE 5 MG/1
5 TABLET ORAL EVERY 4 HOURS PRN
Qty: 28 TABLET | Refills: 0 | Status: SHIPPED | OUTPATIENT
Start: 2023-10-04 | End: 2023-10-11
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
## 2023-10-05 ENCOUNTER — HOSPITAL ENCOUNTER (OUTPATIENT)
Dept: RADIOLOGY | Facility: HOSPITAL | Age: 78
Discharge: HOME | End: 2023-10-05
Attending: REGISTERED NURSE
Payer: MEDICARE

## 2023-10-05 ENCOUNTER — OFFICE VISIT (OUTPATIENT)
Dept: ORTHOPEDICS | Facility: CLINIC | Age: 78
End: 2023-10-05
Payer: MEDICARE

## 2023-10-05 VITALS — BODY MASS INDEX: 43.24 KG/M2 | WEIGHT: 235 LBS | HEIGHT: 62 IN

## 2023-10-05 DIAGNOSIS — M51.36 DDD (DEGENERATIVE DISC DISEASE), LUMBAR: ICD-10-CM

## 2023-10-05 DIAGNOSIS — M54.16 LUMBAR RADICULOPATHY, CHRONIC: Primary | ICD-10-CM

## 2023-10-05 PROCEDURE — 1159F MED LIST DOCD IN RCRD: CPT | Mod: CPTII,S$GLB,, | Performed by: REGISTERED NURSE

## 2023-10-05 PROCEDURE — 72110 X-RAY EXAM L-2 SPINE 4/>VWS: CPT | Mod: TC

## 2023-10-05 PROCEDURE — 99999 PR PBB SHADOW E&M-EST. PATIENT-LVL IV: CPT | Mod: PBBFAC,,, | Performed by: REGISTERED NURSE

## 2023-10-05 PROCEDURE — 1159F PR MEDICATION LIST DOCUMENTED IN MEDICAL RECORD: ICD-10-PCS | Mod: CPTII,S$GLB,, | Performed by: REGISTERED NURSE

## 2023-10-05 PROCEDURE — 99214 OFFICE O/P EST MOD 30 MIN: CPT | Mod: S$GLB,,, | Performed by: REGISTERED NURSE

## 2023-10-05 PROCEDURE — 3288F FALL RISK ASSESSMENT DOCD: CPT | Mod: CPTII,S$GLB,, | Performed by: REGISTERED NURSE

## 2023-10-05 PROCEDURE — 1125F PR PAIN SEVERITY QUANTIFIED, PAIN PRESENT: ICD-10-PCS | Mod: CPTII,S$GLB,, | Performed by: REGISTERED NURSE

## 2023-10-05 PROCEDURE — 1101F PR PT FALLS ASSESS DOC 0-1 FALLS W/OUT INJ PAST YR: ICD-10-PCS | Mod: CPTII,S$GLB,, | Performed by: REGISTERED NURSE

## 2023-10-05 PROCEDURE — 99999 PR PBB SHADOW E&M-EST. PATIENT-LVL IV: ICD-10-PCS | Mod: PBBFAC,,, | Performed by: REGISTERED NURSE

## 2023-10-05 PROCEDURE — 1125F AMNT PAIN NOTED PAIN PRSNT: CPT | Mod: CPTII,S$GLB,, | Performed by: REGISTERED NURSE

## 2023-10-05 PROCEDURE — 72110 X-RAY EXAM L-2 SPINE 4/>VWS: CPT | Mod: 26,,, | Performed by: RADIOLOGY

## 2023-10-05 PROCEDURE — 3288F PR FALLS RISK ASSESSMENT DOCUMENTED: ICD-10-PCS | Mod: CPTII,S$GLB,, | Performed by: REGISTERED NURSE

## 2023-10-05 PROCEDURE — 99214 PR OFFICE/OUTPT VISIT, EST, LEVL IV, 30-39 MIN: ICD-10-PCS | Mod: S$GLB,,, | Performed by: REGISTERED NURSE

## 2023-10-05 PROCEDURE — 1101F PT FALLS ASSESS-DOCD LE1/YR: CPT | Mod: CPTII,S$GLB,, | Performed by: REGISTERED NURSE

## 2023-10-05 PROCEDURE — 72110 XR LUMBAR SPINE AP AND LAT WITH FLEX/EXT: ICD-10-PCS | Mod: 26,,, | Performed by: RADIOLOGY

## 2023-10-05 RX ORDER — GABAPENTIN 300 MG/1
300 CAPSULE ORAL 3 TIMES DAILY
Qty: 90 CAPSULE | Refills: 1 | Status: SHIPPED | OUTPATIENT
Start: 2023-10-05 | End: 2023-10-16

## 2023-10-06 ENCOUNTER — TELEPHONE (OUTPATIENT)
Dept: URGENT CARE | Facility: CLINIC | Age: 78
End: 2023-10-06
Payer: MEDICARE

## 2023-10-08 NOTE — Clinical Note
Please schedule labs for RTC and bone density in June 2023 acetaminophen 325 mg oral tablet: 2 tab(s) orally every 6 hours As needed Temp greater or equal to 38C (100.4F), Mild Pain (1 - 3)  aspirin 81 mg oral tablet, chewable: 1 tab(s) orally once a day  loperamide 2 mg oral capsule: 1 cap(s) orally every 12 hours  psyllium 3.4 g/7 g oral powder for reconstitution: 1 packet(s) orally 3 times a day   acetaminophen 325 mg oral tablet: 2 tab(s) orally every 6 hours As needed Temp greater or equal to 38C (100.4F), Mild Pain (1 - 3)  aspirin 81 mg oral tablet, chewable: 1 tab(s) orally once a day  loperamide 2 mg oral capsule: 1 cap(s) orally every 12 hours  Multiple Vitamins oral tablet: 1 tab(s) orally once a day  psyllium 3.4 g/7 g oral powder for reconstitution: 1 packet(s) orally 3 times a day

## 2023-10-09 NOTE — PROGRESS NOTES
Established Patient - Audio Only Telehealth Visit     The patient location is: home  The chief complaint leading to consultation is: MRI results  Visit type: Virtual visit with audio only (telephone)  Total time spent with patient: 15min     The reason for the audio only service rather than synchronous audio and video virtual visit was related to technical difficulties or patient preference/necessity.     Each patient to whom I provide medical services by telemedicine is:  (1) informed of the relationship between the physician and patient and the respective role of any other health care provider with respect to management of the patient; and (2) notified that they may decline to receive medical services by telemedicine and may withdraw from such care at any time. Patient verbally consented to receive this service via voice-only telephone call.    DATE: 10/16/2023  PATIENT: Kaylee Romero    Attending Physician: Winston Mccartney M.D.    HISTORY:  Kaylee Romero is a 78 y.o. female who returns to me today for MRI results.  She was last seen by me 10/5/2023.  Today she is doing well but notes low back and left anterior leg pain (Back - 2, Leg - 9).   The Patient denies myelopathic symptoms such as handwriting changes or difficulty with buttons/coins/keys. Denies perineal paresthesias, bowel/bladder dysfunction.    PMH/PSH/FamHx/SocHx:  Unchanged from prior visit    ROS:  REVIEW OF SYSTEMS:  Constitution: Negative. Negative for chills, fever and night sweats.   HENT: Negative for congestion and headaches.    Eyes: Negative for blurred vision, left vision loss and right vision loss.   Cardiovascular: Negative for chest pain and syncope.   Respiratory: Negative for cough and shortness of breath.    Endocrine: Negative for polydipsia, polyphagia and polyuria.   Hematologic/Lymphatic: Negative for bleeding problem. Does not bruise/bleed easily.   Skin: Negative for dry skin, itching and rash.   Musculoskeletal:  Negative for falls and muscle weakness.   Gastrointestinal: Negative for abdominal pain and bowel incontinence.   Allergic/Immunologic: Negative for hives and persistent infections.  Genitourinary: Negative for urinary retention/incontinence and nocturia.   Neurological: negative for disturbances in coordination, no myelopathic symptoms such as handwriting changes or difficulty with buttons, coins, keys or small objects. No loss of balance and seizures.   Psychiatric/Behavioral: Negative for depression. The patient does not have insomnia.   Denies perineal paresthesias, bowel or bladder incontinence    EXAM:  LMP 01/09/2001 (Approximate)   Stable.     IMAGING:    Today I personally re- reviewed AP, Lat and Flex/Ex  upright L-spine that demonstrate mild anterolisthesis of L4 in relation to L5. Moderate DDD throughout.      Lumbar MRI shows bilateral foraminal narrowing from L4-S1. No significant stenosis.     Hip MRI shows suspected tear involving the anterior cartilaginous labrum with markedly frayed margins on a degenerative basis. Chronic degenerative changes of the left hip articulation with minimal surface fraying and cartilage particularly along the the apical surface.    There is no height or weight on file to calculate BMI.    Hemoglobin A1C   Date Value Ref Range Status   07/18/2023 6.3 (H) 4.0 - 5.6 % Final     Comment:     ADA Screening Guidelines:  5.7-6.4%  Consistent with prediabetes  >or=6.5%  Consistent with diabetes    High levels of fetal hemoglobin interfere with the HbA1C  assay. Heterozygous hemoglobin variants (HbS, HgC, etc)do  not significantly interfere with this assay.   However, presence of multiple variants may affect accuracy.     02/16/2023 5.9 (H) 4.0 - 5.6 % Final     Comment:     ADA Screening Guidelines:  5.7-6.4%  Consistent with prediabetes  >or=6.5%  Consistent with diabetes    High levels of fetal hemoglobin interfere with the HbA1C  assay. Heterozygous hemoglobin variants (HbS,  HgC, etc)do  not significantly interfere with this assay.   However, presence of multiple variants may affect accuracy.     09/29/2022 6.2 (H) 4.0 - 5.6 % Final     Comment:     ADA Screening Guidelines:  5.7-6.4%  Consistent with prediabetes  >or=6.5%  Consistent with diabetes    High levels of fetal hemoglobin interfere with the HbA1C  assay. Heterozygous hemoglobin variants (HbS, HgC, etc)do  not significantly interfere with this assay.   However, presence of multiple variants may affect accuracy.           ASSESSMENT/PLAN:    Diagnoses and all orders for this visit:    Lumbar radiculopathy, chronic    Other orders  -     gabapentin (NEURONTIN) 100 MG capsule; Take 1 capsule (100 mg total) by mouth 2 (two) times daily.       Pain likely coming from left labral tear. She will follow up with Dr. Grant for her hip MRI.  I will call for follow up after her  visit with Dr. Grant.      This service was not originating from a related E/M service provided within the previous 7 days nor will  to an E/M service or procedure within the next 24 hours or my soonest available appointment.  Prevailing standard of care was able to be met in this audio-only visit.

## 2023-10-11 ENCOUNTER — HOSPITAL ENCOUNTER (OUTPATIENT)
Dept: RADIOLOGY | Facility: HOSPITAL | Age: 78
Discharge: HOME | End: 2023-10-11
Attending: STUDENT IN AN ORGANIZED HEALTH CARE EDUCATION/TRAINING PROGRAM
Payer: MEDICARE

## 2023-10-11 DIAGNOSIS — M51.36 DDD (DEGENERATIVE DISC DISEASE), LUMBAR: ICD-10-CM

## 2023-10-11 DIAGNOSIS — M54.16 LUMBAR RADICULOPATHY, CHRONIC: ICD-10-CM

## 2023-10-11 DIAGNOSIS — M79.605 LEFT LEG PAIN: ICD-10-CM

## 2023-10-11 PROCEDURE — 73721 MRI JNT OF LWR EXTRE W/O DYE: CPT | Mod: TC,PO,LT

## 2023-10-11 PROCEDURE — 72148 MRI LUMBAR SPINE W/O DYE: CPT | Mod: TC,PO

## 2023-10-16 ENCOUNTER — TELEPHONE (OUTPATIENT)
Dept: ORTHOPEDICS | Facility: CLINIC | Age: 78
End: 2023-10-16
Payer: MEDICARE

## 2023-10-16 ENCOUNTER — OFFICE VISIT (OUTPATIENT)
Dept: ORTHOPEDICS | Facility: CLINIC | Age: 78
End: 2023-10-16
Payer: MEDICARE

## 2023-10-16 DIAGNOSIS — M54.16 LUMBAR RADICULOPATHY, CHRONIC: Primary | ICD-10-CM

## 2023-10-16 PROCEDURE — 99442 PR PHYSICIAN TELEPHONE EVALUATION 11-20 MIN: ICD-10-PCS | Mod: 95,,, | Performed by: REGISTERED NURSE

## 2023-10-16 PROCEDURE — 1159F MED LIST DOCD IN RCRD: CPT | Mod: CPTII,95,, | Performed by: REGISTERED NURSE

## 2023-10-16 PROCEDURE — 1160F PR REVIEW ALL MEDS BY PRESCRIBER/CLIN PHARMACIST DOCUMENTED: ICD-10-PCS | Mod: CPTII,95,, | Performed by: REGISTERED NURSE

## 2023-10-16 PROCEDURE — 1160F RVW MEDS BY RX/DR IN RCRD: CPT | Mod: CPTII,95,, | Performed by: REGISTERED NURSE

## 2023-10-16 PROCEDURE — 99442 PR PHYSICIAN TELEPHONE EVALUATION 11-20 MIN: CPT | Mod: 95,,, | Performed by: REGISTERED NURSE

## 2023-10-16 PROCEDURE — 1159F PR MEDICATION LIST DOCUMENTED IN MEDICAL RECORD: ICD-10-PCS | Mod: CPTII,95,, | Performed by: REGISTERED NURSE

## 2023-10-16 RX ORDER — GABAPENTIN 100 MG/1
100 CAPSULE ORAL 2 TIMES DAILY
Qty: 90 CAPSULE | Refills: 0 | Status: SHIPPED | OUTPATIENT
Start: 2023-10-16 | End: 2024-01-03 | Stop reason: SDUPTHER

## 2023-10-18 ENCOUNTER — OFFICE VISIT (OUTPATIENT)
Dept: ORTHOPEDICS | Facility: CLINIC | Age: 78
End: 2023-10-18
Payer: MEDICARE

## 2023-10-18 VITALS — DIASTOLIC BLOOD PRESSURE: 84 MMHG | HEART RATE: 95 BPM | SYSTOLIC BLOOD PRESSURE: 127 MMHG

## 2023-10-18 DIAGNOSIS — R29.898 LEG WEAKNESS, BILATERAL: ICD-10-CM

## 2023-10-18 DIAGNOSIS — M51.37 DDD (DEGENERATIVE DISC DISEASE), LUMBOSACRAL: Primary | ICD-10-CM

## 2023-10-18 DIAGNOSIS — M54.16 LUMBAR RADICULOPATHY, CHRONIC: ICD-10-CM

## 2023-10-18 DIAGNOSIS — M51.26 PROTRUSION OF LUMBAR INTERVERTEBRAL DISC: ICD-10-CM

## 2023-10-18 PROCEDURE — 99999 PR PBB SHADOW E&M-EST. PATIENT-LVL IV: CPT | Mod: PBBFAC,,, | Performed by: STUDENT IN AN ORGANIZED HEALTH CARE EDUCATION/TRAINING PROGRAM

## 2023-10-18 PROCEDURE — 3079F PR MOST RECENT DIASTOLIC BLOOD PRESSURE 80-89 MM HG: ICD-10-PCS | Mod: CPTII,S$GLB,, | Performed by: STUDENT IN AN ORGANIZED HEALTH CARE EDUCATION/TRAINING PROGRAM

## 2023-10-18 PROCEDURE — 99214 PR OFFICE/OUTPT VISIT, EST, LEVL IV, 30-39 MIN: ICD-10-PCS | Mod: S$GLB,,, | Performed by: STUDENT IN AN ORGANIZED HEALTH CARE EDUCATION/TRAINING PROGRAM

## 2023-10-18 PROCEDURE — 3079F DIAST BP 80-89 MM HG: CPT | Mod: CPTII,S$GLB,, | Performed by: STUDENT IN AN ORGANIZED HEALTH CARE EDUCATION/TRAINING PROGRAM

## 2023-10-18 PROCEDURE — 1159F PR MEDICATION LIST DOCUMENTED IN MEDICAL RECORD: ICD-10-PCS | Mod: CPTII,S$GLB,, | Performed by: STUDENT IN AN ORGANIZED HEALTH CARE EDUCATION/TRAINING PROGRAM

## 2023-10-18 PROCEDURE — 1160F PR REVIEW ALL MEDS BY PRESCRIBER/CLIN PHARMACIST DOCUMENTED: ICD-10-PCS | Mod: CPTII,S$GLB,, | Performed by: STUDENT IN AN ORGANIZED HEALTH CARE EDUCATION/TRAINING PROGRAM

## 2023-10-18 PROCEDURE — 1160F RVW MEDS BY RX/DR IN RCRD: CPT | Mod: CPTII,S$GLB,, | Performed by: STUDENT IN AN ORGANIZED HEALTH CARE EDUCATION/TRAINING PROGRAM

## 2023-10-18 PROCEDURE — 3074F PR MOST RECENT SYSTOLIC BLOOD PRESSURE < 130 MM HG: ICD-10-PCS | Mod: CPTII,S$GLB,, | Performed by: STUDENT IN AN ORGANIZED HEALTH CARE EDUCATION/TRAINING PROGRAM

## 2023-10-18 PROCEDURE — 1125F PR PAIN SEVERITY QUANTIFIED, PAIN PRESENT: ICD-10-PCS | Mod: CPTII,S$GLB,, | Performed by: STUDENT IN AN ORGANIZED HEALTH CARE EDUCATION/TRAINING PROGRAM

## 2023-10-18 PROCEDURE — 1159F MED LIST DOCD IN RCRD: CPT | Mod: CPTII,S$GLB,, | Performed by: STUDENT IN AN ORGANIZED HEALTH CARE EDUCATION/TRAINING PROGRAM

## 2023-10-18 PROCEDURE — 3288F PR FALLS RISK ASSESSMENT DOCUMENTED: ICD-10-PCS | Mod: CPTII,S$GLB,, | Performed by: STUDENT IN AN ORGANIZED HEALTH CARE EDUCATION/TRAINING PROGRAM

## 2023-10-18 PROCEDURE — 3288F FALL RISK ASSESSMENT DOCD: CPT | Mod: CPTII,S$GLB,, | Performed by: STUDENT IN AN ORGANIZED HEALTH CARE EDUCATION/TRAINING PROGRAM

## 2023-10-18 PROCEDURE — 1101F PR PT FALLS ASSESS DOC 0-1 FALLS W/OUT INJ PAST YR: ICD-10-PCS | Mod: CPTII,S$GLB,, | Performed by: STUDENT IN AN ORGANIZED HEALTH CARE EDUCATION/TRAINING PROGRAM

## 2023-10-18 PROCEDURE — 1101F PT FALLS ASSESS-DOCD LE1/YR: CPT | Mod: CPTII,S$GLB,, | Performed by: STUDENT IN AN ORGANIZED HEALTH CARE EDUCATION/TRAINING PROGRAM

## 2023-10-18 PROCEDURE — 3074F SYST BP LT 130 MM HG: CPT | Mod: CPTII,S$GLB,, | Performed by: STUDENT IN AN ORGANIZED HEALTH CARE EDUCATION/TRAINING PROGRAM

## 2023-10-18 PROCEDURE — 1125F AMNT PAIN NOTED PAIN PRSNT: CPT | Mod: CPTII,S$GLB,, | Performed by: STUDENT IN AN ORGANIZED HEALTH CARE EDUCATION/TRAINING PROGRAM

## 2023-10-18 PROCEDURE — 99999 PR PBB SHADOW E&M-EST. PATIENT-LVL IV: ICD-10-PCS | Mod: PBBFAC,,, | Performed by: STUDENT IN AN ORGANIZED HEALTH CARE EDUCATION/TRAINING PROGRAM

## 2023-10-18 PROCEDURE — 99214 OFFICE O/P EST MOD 30 MIN: CPT | Mod: S$GLB,,, | Performed by: STUDENT IN AN ORGANIZED HEALTH CARE EDUCATION/TRAINING PROGRAM

## 2023-10-18 NOTE — PROGRESS NOTES
CC: left leg pain    78 y.o. Female presents today for follow up evaluation of her left leg pain and to review MRI results.     Attempted treatments: gabapentin 100mg (changed from 300mg)  Pain score: 8/10  History of trauma/injury: fall 1 week ago, states she is now experiencing weakness in both legs  Affecting ADLs: yes     REVIEW OF SYSTEMS:   Constitution: Patient denies fever or chills.  Eyes: Patient denies eye pain or vision changes.  HEENT: Patient denies ear pain, sore throat, or nasal discharge.  CVS: Patient denies chest pain.  Lungs: Patient denies shortness of breath or cough.  Skin: Patient denies skin rash or itching.    Musculoskeletal: Patient denies recent falls. See HPI.  Psych: Patient denies any current anxiety or nervousness.    PAST MEDICAL HISTORY:   Past Medical History:   Diagnosis Date    Age-related osteoporosis without current pathological fracture 01/11/2019    Anemia     Cataract     CKD (chronic kidney disease) stage 3, GFR 30-59 ml/min     Coronary artery disease     Dry mouth     History of TIA (transient ischemic attack) 12/11/2018    Hyperlipidemia 12/02/2014    Hypertension     Hypertensive heart disease with diastolic heart failure 11/28/2012    Morbid obesity with body mass index (BMI) of 40.0 or higher 05/09/2016    KAMRNA (obstructive sleep apnea)     KAMRAN on CPAP 06/28/2016    Osteoporosis without current pathological fracture 11/11/2018    Physical deconditioning 11/05/2018    Status post total left knee replacement 5/1/2017 05/16/2017    Type 2 diabetes mellitus with stage 3 chronic kidney disease, without long-term current use of insulin 05/16/2019       MEDICATIONS:     Current Outpatient Medications:     acetaminophen (TYLENOL) 500 MG tablet, Take 500 mg by mouth 2 (two) times daily as needed for Pain., Disp: , Rfl:     acetaminophen (TYLENOL) 500 MG tablet, Take 1 tablet (500 mg total) by mouth every 6 (six) hours as needed for Pain., Disp: 20 tablet, Rfl: 0    alendronate  "(FOSAMAX) 70 MG tablet, Take 1 tablet (70 mg total) by mouth every 7 days., Disp: 12 tablet, Rfl: 0    amLODIPine (NORVASC) 10 MG tablet, TAKE 1 TABLET(10 MG) BY MOUTH EVERY DAY, Disp: 90 tablet, Rfl: 2    ammonium lactate (LAC-HYDRIN) 12 % lotion, Apply topically as needed for Dry Skin. On the feet and toenails., Disp: 225 g, Rfl: 6    amoxicillin (AMOXIL) 500 MG capsule, TAKE ALL 4 CAPSULES 1 HOUR PRIOR TO APPOINTMENT, Disp: , Rfl:     atorvastatin (LIPITOR) 80 MG tablet, TAKE 1 TABLET(80 MG) BY MOUTH EVERY DAY, Disp: 90 tablet, Rfl: 0    BD BOO 2ND GEN PEN NEEDLE 32 gauge x 5/32" Ndle, To injection daily, Disp: 100 each, Rfl: 8    blood sugar diagnostic Strp, 1 strip by Misc.(Non-Drug; Combo Route) route once daily., Disp: 100 strip, Rfl: 3    blood-glucose meter,continuous (DEXCOM G7 ) Misc, To use with sensor, Disp: 1 each, Rfl: 0    blood-glucose sensor (DEXCOM G7 SENSOR) Areli, To change every 10 days, Disp: 3 each, Rfl: 3    carvediloL (COREG) 25 MG tablet, TAKE 1 TABLET(25 MG) BY MOUTH TWICE DAILY WITH MEALS, Disp: 180 tablet, Rfl: 2    clopidogreL (PLAVIX) 75 mg tablet, Take 1 tablet (75 mg total) by mouth once daily., Disp: 90 tablet, Rfl: 3    COD LIVER OIL ORAL, TAKE 2 CAPSULES BY MOUTH EVERY MORNING AND 1 CAPSULE EVERY EVENING, Disp: , Rfl:     cyclobenzaprine (FLEXERIL) 10 MG tablet, Take 1/2 tablet by mouth three times a day as needed for muscle spasms, Disp: 90 tablet, Rfl: 1    diclofenac sodium (VOLTAREN) 1 % Gel, Apply 2 g topically 4 (four) times daily as needed. To painful area on the feet. (Patient taking differently: Apply 2 g topically 4 (four) times daily as needed. To painful area on the feet and knee), Disp: 500 g, Rfl: 5    diphenoxylate-atropine 2.5-0.025 mg (LOMOTIL) 2.5-0.025 mg per tablet, TAKE 1 TABLET BY MOUTH FOUR TIMES DAILY AS NEEDED, Disp: 30 tablet, Rfl: 3    dulaglutide (TRULICITY) 4.5 mg/0.5 mL pen injector, Inject 4.5 mg into the skin every 7 days. (Patient taking " differently: Inject 4.5 mg into the skin every Sunday.), Disp: 2 mL, Rfl: 3    ferrous sulfate (FEROSUL) 325 mg (65 mg iron) Tab tablet, TAKE 1 TABLET BY MOUTH DAILY, Disp: 90 tablet, Rfl: 0    furosemide (LASIX) 20 MG tablet, TAKE 1 TABLET BY MOUTH ON SATURDAY, SUNDAY, MONDAY, WEDNESDAY AND FRIDAY, Disp: 30 tablet, Rfl: 3    gabapentin (NEURONTIN) 100 MG capsule, Take 1 capsule (100 mg total) by mouth 2 (two) times daily., Disp: 90 capsule, Rfl: 0    glipiZIDE (GLUCOTROL) 5 MG tablet, Take 2 tablets (10 mg total) by mouth 2 (two) times daily with meals., Disp: 360 tablet, Rfl: 3    glucagon (GVOKE HYPOPEN 2-PACK) 1 mg/0.2 mL AtIn, Inject 1 Package into the skin as needed., Disp: 2 Syringe, Rfl: 0    insulin glargine,hum.rec.anlog (BASAGLAR KWIKPEN U-100 INSULIN SUBQ), Inject 22 Units into the skin every evening., Disp: , Rfl:     irbesartan (AVAPRO) 300 MG tablet, TAKE 1 TABLET(300 MG) BY MOUTH EVERY EVENING, Disp: 90 tablet, Rfl: 2    isosorbide mononitrate (IMDUR) 30 MG 24 hr tablet, TAKE 2 TABLETS(60 MG) BY MOUTH EVERY DAY, Disp: 180 tablet, Rfl: 3    lancets Misc, 1 lancet by Misc.(Non-Drug; Combo Route) route once daily., Disp: 100 each, Rfl: 3    multivitamin (THERAGRAN) per tablet, Take 1 tablet by mouth once daily., Disp: , Rfl:     oxyCODONE-acetaminophen (PERCOCET) 5-325 mg per tablet, Take 1 tablet by mouth every 8 (eight) hours as needed for Pain., Disp: 10 tablet, Rfl: 0    vitamin D (VITAMIN D3) 1000 units Tab, Take 1,000 Units by mouth twice a week. Monday AND THURSDAYS ONLY, Disp: , Rfl:     aspirin (ECOTRIN) 81 MG EC tablet, Take 1 tablet (81 mg total) by mouth 2 (two) times a day., Disp: 60 tablet, Rfl: 0    nitroGLYCERIN (NITROSTAT) 0.4 MG SL tablet, Place 1 tablet (0.4 mg total) under the tongue every 5 (five) minutes as needed for Chest pain., Disp: 25 tablet, Rfl: 6    Current Facility-Administered Medications:     hyaluronate sodium, stabilized (MONOVISC) Syrg, , Intra-articular, 1 time in  Clinic/HOD, Peterson Sharma MD    ALLERGIES:   Review of patient's allergies indicates:   Allergen Reactions    Bactrim [sulfamethoxazole-trimethoprim] Other (See Comments)     Generic version  Sulfa makes her sick    Keflex [cephalexin] Other (See Comments)     Turns orange        MRI Lumbar Spine Without Contrast  Examination: MRI of the lumbar spine without contrast    CLINICAL HISTORY: Lumbar radiculopathy with symptoms persisted despite conservative treatment. Low back and left leg pain.    COMPARISON: None    TECHNIQUE: Routine MR evaluation of the lumbar spine was gathered utilizing high-field system with a series of T1, T2, and inversion recovery sequences and both the sagittal and axial planes.    FINDINGS: Minimal levoconvex alignment of the lumbar spine with measure Mosher's angle 29 degrees. Severe hyperlordosis of the lumbar spine convexity with appropriate stature and contour of all of all of the vertebral bodies included. No evidence of outstanding pathologic marrow signal or bony destructive changes. Chronic fibrofatty changes across the opposing endplates at the L5-S1 intervertebral disc level. Distal thoracic cord and tip of the conus tapers normally dorsal to the L1/L2 intervertebral disc level. The prevertebral soft tissues yielding no significant finding. Multilevel disc desiccation with decreased T2 signal within the first 3 intervertebral disc levels. Tiny cyst projecting from the posterior cortex of the right kidney.    At T11/T12 and T12/L1, mild annular bulges projected beyond the posterior vertebral column causing minimal content upon the anterior edge of the thecal sac. No significant compromise the spinal canal. Bilateral facet arthrosis. AP demonstrate a canal was well-maintained. Bilateral neuroforaminal encroachment at the T11/T12 intervertebral disc level.    At the L1/L2 intervertebral disc level, decreased stature and decreased height and signal, mild concentric annular bulging,  though no evidence of arleen disc protrusion or extrusion. Bilateral facet arthrosis. Minimal perineural fat effacement with contact of the ascending nerve towards the left. Asymmetric facet arthrosis favors alignment.    At the L2/L3 level, loss of stature and decreased hydration signal. Mild diffuse annular bulging. No evidence of high-grade spinal canal stenosis. Prominent osteophytic spurs encroaching upon the bilateral neuroforaminal spaces.    At L3/L4, neuroforaminal disc protrusion encroaching upon the right-sided neural foraminal space with minimal contact of the exiting nerve root as it exits just below the lower pedicle region. The AP diameter the spinal canal is well contained. Minimal dorsal subluxation of L2 upon L3 on a chronic basis. Asymmetric facet arthrosis favors the latter. Prominent intralaminar fat. No high-grade spinal canal stenosis.    At L4/L5, anterior subluxation of L4 upon L5, mild diffuse concentric annular bulging, constriction of the right lateral recess be primarily as a result of asymmetric facet arthrosis that favors the right. Bulky ligamentum flavum thickening. Mild spinal canal stenosis 8 mm wide.    At L5/S1, focal disc protrusion in the left paracentral region outlined by prominent osteophytic spurs encroaching and indenting the transiting S1 nerve in the lateral pattern. Findings are exacerbated by bilateral facet arthrosis that is more prominent towards the left. Right neuroforaminal spaces free of any significant occlusion. No significant lateral recess stenosis on the right. Severe left-sided neuroforaminal space narrowing due to to osteophytic lipping.    IMPRESSION:  1. Left paracentral/neuroforaminal disc protrusion at the L5-S1 level with prominent osteophytic spurs at the L5/S1 intervertebral disc level with impaction and displacement of the transiting S1 nerve root.  2. Chronic anterior subluxation of L4 upon L5 on degenerative basis.  3. Detailed description as  above.    Electronically signed by:  Yoni Bennett MD  10/11/2023 03:09 PM CDT Workstation: [id number]  MRI Hip Without Contrast Left  Examination: MRI of the left hip without contrast.    CLINICAL HISTORY: Chronic hip pain    COMPARISON: None    Technical factors: Multisequential multiplanar MR examination of the left hip was completed utilizing a combination of T1, proton density, and inversion recovery sequences in all 3 orthogonal planes.    FINDINGS:    Femoral head maintains normal articulation between the articular cartilage spaces without evidence anterior and posterior migration of the femoral head within the articular cartilage spaces.    Mild concentric thickening of the articular cartilage along the apical surface without evidence of exposure of the underlying subchondral bone. No evidence of full-thickness cartilage loss nor evidence of focal area of subchondral edema along the acetabular margin.    Chronically frayed lateral edge of the acetabular labrum on the degenerative basis. Suspected tear along the anterior edge as best determined on series 7 image #20.    No evidence of full thickness labral tear.    Iliopsoas tendon appears uniform in signal. The muscular structures appear unremarkable. No evidence of parafalcial edema or loss the overall muscle bulk.    The urinary bladder is filled to capacity. There are several small nodes in the deep pelvic cavity and left groin region.    No evidence of fluid distention within the trochanteric bursa. No manifestations of trochanteric bursitis.    Abductor compartment appears normal.    IMPRESSION:  1. Chronic degenerative changes of the left hip articulation with minimal surface fraying and cartilage particularly along the the apical surface.  2. Suspected tear involving the anterior cartilaginous labrum with markedly frayed margins on a degenerative basis.    Electronically signed by:  Yoni Bennett MD  10/11/2023 02:39 PM CDT Workstation:  [id number]      PHYSICAL EXAMINATION:  /84   Pulse 95   LMP 01/09/2001 (Approximate)   Vitals signs and nursing note have been reviewed.    General: In no acute distress, well developed, well nourished, no diaphoresis  Eyes: EOM full and smooth, no eye redness or discharge  HENT: normocephalic and atraumatic, neck supple, trachea midline, no nasal discharge  Cardiovascular: no LE edema  Lungs: respirations non-labored, no conversational dyspnea   Neuro: AAOx3, CN2-12 grossly intact  Skin: No rashes, warm and dry  Psychiatric: cooperative, pleasant, mood and affect appropriate for age    ASSESSMENT:      ICD-10-CM ICD-9-CM   1. DDD (degenerative disc disease), lumbosacral  M51.37 722.52   2. Protrusion of lumbar intervertebral disc  M51.26 722.10         PLAN:  MRI images and read were discussed with patient at today's visit.  Patient was spinal pathology that could be contributing with her symptoms.  I am less concerned about the labral pathology in the hip osteoarthritis that was seen on MRI.  Patient has an upcoming appointment with [facility], which she was encouraged to keep.    Future planning includes - keep follow-up with [facility]    All questions were answered to the best of my ability and all concerns were addressed at this time.    Follow up for above, or sooner if needed.      This note is dictated using the M*Modal Fluency Direct word recognition program. There are word recognition mistakes that are occasionally missed on review.

## 2023-10-21 ENCOUNTER — PATIENT MESSAGE (OUTPATIENT)
Dept: ORTHOPEDICS | Facility: CLINIC | Age: 78
End: 2023-10-21
Payer: MEDICARE

## 2023-10-23 ENCOUNTER — OFFICE VISIT (OUTPATIENT)
Dept: ORTHOPEDICS | Facility: CLINIC | Age: 78
End: 2023-10-23
Payer: MEDICARE

## 2023-10-23 ENCOUNTER — TELEPHONE (OUTPATIENT)
Dept: ORTHOPEDICS | Facility: CLINIC | Age: 78
End: 2023-10-23
Payer: MEDICARE

## 2023-10-23 DIAGNOSIS — M41.50 DEGENERATIVE SCOLIOSIS: ICD-10-CM

## 2023-10-23 DIAGNOSIS — M51.36 DDD (DEGENERATIVE DISC DISEASE), LUMBAR: ICD-10-CM

## 2023-10-23 DIAGNOSIS — M54.16 LUMBAR RADICULOPATHY, CHRONIC: Primary | ICD-10-CM

## 2023-10-23 PROCEDURE — 99442 PR PHYSICIAN TELEPHONE EVALUATION 11-20 MIN: CPT | Mod: 95,,, | Performed by: PHYSICIAN ASSISTANT

## 2023-10-23 PROCEDURE — 99442 PR PHYSICIAN TELEPHONE EVALUATION 11-20 MIN: ICD-10-PCS | Mod: 95,,, | Performed by: PHYSICIAN ASSISTANT

## 2023-10-23 NOTE — PROGRESS NOTES
Established Patient - Audio Only Telehealth Visit     The patient location is: home  The chief complaint leading to consultation is: MRI results  Visit type: Virtual visit with audio only (telephone)  Total time spent with patient: 20 minutes       The reason for the audio only service rather than synchronous audio and video virtual visit was related to technical difficulties or patient preference/necessity.     Each patient to whom I provide medical services by telemedicine is:  (1) informed of the relationship between the physician and patient and the respective role of any other health care provider with respect to management of the patient; and (2) notified that they may decline to receive medical services by telemedicine and may withdraw from such care at any time. Patient verbally consented to receive this service via voice-only telephone call.       HPI: this patient of Abbey's presents for MRI results.  She has primarily left anterior leg pain from the hip to the ankle.     MRI lumbar spine demonstrates multilevel spondylosis with a left paracentral disc protrusion at L5/S1.     Assessment and plan:     Today we discussed at length all of the different treatment options including anti-inflammatories, acetaminophen, rest, ice, heat, physical therapy including strengthening and stretching exercises, home exercises, ROM, aerobic conditioning, aqua therapy, other modalities including ultrasound, massage, and dry needling, epidural steroid injections and finally surgical intervention.      She would like to think about her options.  She will keep her appointment with Abbey next week to discuss further.                        This service was not originating from a related E/M service provided within the previous 7 days nor will  to an E/M service or procedure within the next 24 hours or my soonest available appointment.  Prevailing standard of care was able to be met in this audio-only visit.

## 2023-10-23 NOTE — TELEPHONE ENCOUNTER
Spoke to patient regarding moving her audio visit to a sooner appointment. Patient stated that she will wait until Abbey get's back to work. Stated thank you for calling. Thanks.

## 2023-10-24 ENCOUNTER — PATIENT MESSAGE (OUTPATIENT)
Dept: ORTHOPEDICS | Facility: CLINIC | Age: 78
End: 2023-10-24
Payer: MEDICARE

## 2023-10-30 ENCOUNTER — PATIENT MESSAGE (OUTPATIENT)
Dept: PHARMACY | Facility: CLINIC | Age: 78
End: 2023-10-30
Payer: MEDICARE

## 2023-10-30 ENCOUNTER — TELEPHONE (OUTPATIENT)
Dept: PHARMACY | Facility: CLINIC | Age: 78
End: 2023-10-30
Payer: MEDICARE

## 2023-10-30 ENCOUNTER — PATIENT MESSAGE (OUTPATIENT)
Dept: ORTHOPEDICS | Facility: CLINIC | Age: 78
End: 2023-10-30
Payer: MEDICARE

## 2023-10-30 NOTE — PROGRESS NOTES
Established Patient - Audio Only Telehealth Visit     The patient location is: home  The chief complaint leading to consultation is: f/u  Visit type: Virtual visit with audio only (telephone)  Total time spent with patient: 30min     The reason for the audio only service rather than synchronous audio and video virtual visit was related to technical difficulties or patient preference/necessity.     Each patient to whom I provide medical services by telemedicine is:  (1) informed of the relationship between the physician and patient and the respective role of any other health care provider with respect to management of the patient; and (2) notified that they may decline to receive medical services by telemedicine and may withdraw from such care at any time. Patient verbally consented to receive this service via voice-only telephone call.    DATE: 10/31/2023  PATIENT: Kaylee Romero    Attending Physician: Winston Mccartney M.D.    HISTORY:  Kaylee Romero is a 78 y.o. female who returns to me today for f/u.  She was last seen by me 10/16/2023.  Today she is doing well but notes low back and left anterior leg pain (Back - 2, Leg - 9).   The Patient denies myelopathic symptoms such as handwriting changes or difficulty with buttons/coins/keys. Denies perineal paresthesias, bowel/bladder dysfunction.    PMH/PSH/FamHx/SocHx:  Unchanged from prior visit    ROS:  REVIEW OF SYSTEMS:  Constitution: Negative. Negative for chills, fever and night sweats.   HENT: Negative for congestion and headaches.    Eyes: Negative for blurred vision, left vision loss and right vision loss.   Cardiovascular: Negative for chest pain and syncope.   Respiratory: Negative for cough and shortness of breath.    Endocrine: Negative for polydipsia, polyphagia and polyuria.   Hematologic/Lymphatic: Negative for bleeding problem. Does not bruise/bleed easily.   Skin: Negative for dry skin, itching and rash.   Musculoskeletal: Negative for falls  and muscle weakness.   Gastrointestinal: Negative for abdominal pain and bowel incontinence.   Allergic/Immunologic: Negative for hives and persistent infections.  Genitourinary: Negative for urinary retention/incontinence and nocturia.   Neurological: negative for disturbances in coordination, no myelopathic symptoms such as handwriting changes or difficulty with buttons, coins, keys or small objects. No loss of balance and seizures.   Psychiatric/Behavioral: Negative for depression. The patient does not have insomnia.   Denies perineal paresthesias, bowel or bladder incontinence    EXAM:  LMP 01/09/2001 (Approximate)   Stable.     IMAGING:    Today I personally re- reviewed AP, Lat and Flex/Ex  upright L-spine that demonstrate mild anterolisthesis of L4 in relation to L5. Moderate DDD throughout.       Lumbar MRI shows bilateral foraminal narrowing from L4-S1 with a left paracentral disc protrusion at L5/S1. No significant stenosis.      Hip MRI shows suspected tear involving the anterior cartilaginous labrum with markedly frayed margins on a degenerative basis. Chronic degenerative changes of the left hip articulation with minimal surface fraying and cartilage particularly along the the apical surface.    There is no height or weight on file to calculate BMI.    Hemoglobin A1C   Date Value Ref Range Status   07/18/2023 6.3 (H) 4.0 - 5.6 % Final     Comment:     ADA Screening Guidelines:  5.7-6.4%  Consistent with prediabetes  >or=6.5%  Consistent with diabetes    High levels of fetal hemoglobin interfere with the HbA1C  assay. Heterozygous hemoglobin variants (HbS, HgC, etc)do  not significantly interfere with this assay.   However, presence of multiple variants may affect accuracy.     02/16/2023 5.9 (H) 4.0 - 5.6 % Final     Comment:     ADA Screening Guidelines:  5.7-6.4%  Consistent with prediabetes  >or=6.5%  Consistent with diabetes    High levels of fetal hemoglobin interfere with the HbA1C  assay.  Heterozygous hemoglobin variants (HbS, HgC, etc)do  not significantly interfere with this assay.   However, presence of multiple variants may affect accuracy.     09/29/2022 6.2 (H) 4.0 - 5.6 % Final     Comment:     ADA Screening Guidelines:  5.7-6.4%  Consistent with prediabetes  >or=6.5%  Consistent with diabetes    High levels of fetal hemoglobin interfere with the HbA1C  assay. Heterozygous hemoglobin variants (HbS, HgC, etc)do  not significantly interfere with this assay.   However, presence of multiple variants may affect accuracy.           ASSESSMENT/PLAN:    Diagnoses and all orders for this visit:    Lumbar radiculopathy, chronic  -     Procedure Order to Pain Management; Future      Left TF ASHELY ordered with pain mgmt. She may follow up 2 weeks after if her pain persists.      This service was not originating from a related E/M service provided within the previous 7 days nor will  to an E/M service or procedure within the next 24 hours or my soonest available appointment.  Prevailing standard of care was able to be met in this audio-only visit.

## 2023-10-30 NOTE — TELEPHONE ENCOUNTER
We have contacted  to informed her of the re-enrollment process for the  [facility] Patient Assistance Program and what's she needs to provide to continue receiving Trul[employer] through [facility] Patient Assistance Program.

## 2023-10-31 ENCOUNTER — OFFICE VISIT (OUTPATIENT)
Dept: ORTHOPEDICS | Facility: CLINIC | Age: 78
End: 2023-10-31
Payer: MEDICARE

## 2023-10-31 ENCOUNTER — TELEPHONE (OUTPATIENT)
Dept: ORTHOPEDICS | Facility: CLINIC | Age: 78
End: 2023-10-31
Payer: MEDICARE

## 2023-10-31 DIAGNOSIS — M54.16 LUMBAR RADICULOPATHY, CHRONIC: Primary | ICD-10-CM

## 2023-10-31 PROCEDURE — 1159F PR MEDICATION LIST DOCUMENTED IN MEDICAL RECORD: ICD-10-PCS | Mod: CPTII,95,, | Performed by: REGISTERED NURSE

## 2023-10-31 PROCEDURE — 1159F MED LIST DOCD IN RCRD: CPT | Mod: CPTII,95,, | Performed by: REGISTERED NURSE

## 2023-10-31 PROCEDURE — 1160F RVW MEDS BY RX/DR IN RCRD: CPT | Mod: CPTII,95,, | Performed by: REGISTERED NURSE

## 2023-10-31 PROCEDURE — 1160F PR REVIEW ALL MEDS BY PRESCRIBER/CLIN PHARMACIST DOCUMENTED: ICD-10-PCS | Mod: CPTII,95,, | Performed by: REGISTERED NURSE

## 2023-10-31 PROCEDURE — 99443 PR PHYSICIAN TELEPHONE EVALUATION 21-30 MIN: ICD-10-PCS | Mod: 95,,, | Performed by: REGISTERED NURSE

## 2023-10-31 PROCEDURE — 99443 PR PHYSICIAN TELEPHONE EVALUATION 21-30 MIN: CPT | Mod: 95,,, | Performed by: REGISTERED NURSE

## 2023-11-01 ENCOUNTER — TELEPHONE (OUTPATIENT)
Dept: PAIN MEDICINE | Facility: CLINIC | Age: 78
End: 2023-11-01
Payer: MEDICARE

## 2023-11-01 DIAGNOSIS — K58.0 IRRITABLE BOWEL SYNDROME WITH DIARRHEA: ICD-10-CM

## 2023-11-01 NOTE — TELEPHONE ENCOUNTER
----- Message from Pranav Peres sent at 11/1/2023 11:07 AM CDT -----      Name of Who is Calling: GERARDO HOLM [[id number]]      What is the request in detail: Pt returned call to the office.Please contact to further discuss and advise.          Can the clinic reply by [facility]: Y      [facility] [phone]

## 2023-11-02 ENCOUNTER — PATIENT MESSAGE (OUTPATIENT)
Dept: PAIN MEDICINE | Facility: OTHER | Age: 78
End: 2023-11-02
Payer: MEDICARE

## 2023-11-02 ENCOUNTER — TELEPHONE (OUTPATIENT)
Dept: ADMINISTRATIVE | Facility: OTHER | Age: 78
End: 2023-11-02
Payer: MEDICARE

## 2023-11-02 RX ORDER — DIPHENOXYLATE HYDROCHLORIDE AND ATROPINE SULFATE 2.5; .025 MG/1; MG/1
TABLET ORAL
Qty: 30 TABLET | Refills: 0 | Status: SHIPPED | OUTPATIENT
Start: 2023-11-02 | End: 2024-01-03

## 2023-11-03 ENCOUNTER — PATIENT MESSAGE (OUTPATIENT)
Dept: PRIMARY CARE CLINIC | Facility: CLINIC | Age: 78
End: 2023-11-03
Payer: MEDICARE

## 2023-11-03 ENCOUNTER — DOCUMENTATION ONLY (OUTPATIENT)
Dept: CARDIOLOGY | Facility: CLINIC | Age: 78
End: 2023-11-03
Payer: MEDICARE

## 2023-11-03 DIAGNOSIS — D64.9 NORMOCYTIC ANEMIA: ICD-10-CM

## 2023-11-05 ENCOUNTER — PATIENT MESSAGE (OUTPATIENT)
Dept: ORTHOPEDICS | Facility: CLINIC | Age: 78
End: 2023-11-05
Payer: MEDICARE

## 2023-11-05 ENCOUNTER — PATIENT MESSAGE (OUTPATIENT)
Dept: CARDIOLOGY | Facility: CLINIC | Age: 78
End: 2023-11-05
Payer: MEDICARE

## 2023-11-05 RX ORDER — FERROUS SULFATE 325(65) MG
TABLET ORAL DAILY
OUTPATIENT
Start: 2023-11-05

## 2023-11-06 ENCOUNTER — TELEPHONE (OUTPATIENT)
Dept: PAIN MEDICINE | Facility: OTHER | Age: 78
End: 2023-11-06
Payer: MEDICARE

## 2023-11-06 NOTE — TELEPHONE ENCOUNTER
----- Message from Royce Mauricio MD sent at 11/3/2023  2:55 PM CDT -----  Regarding: RE: Request to hold Plavix  Ok note in chart  ----- Message -----  From: Shobha Machado LPN  Sent: 10/31/2023   1:40 PM CDT  To: Royce Mauricio MD  Subject: Request to hold Plavix                           Good afternoon,    Patient will be scheduled to have a procedure with Pain Management, Left L2/3 and L3/4 Transforaminal Epidural Steroid Injection. Staff is requesting to hold Plavix for 7 days prior to procedure. Please advise.    Thank you,  Shobha

## 2023-11-07 DIAGNOSIS — R53.1 WEAKNESS: Primary | ICD-10-CM

## 2023-11-07 RX ORDER — FERROUS SULFATE 325(65) MG
325 TABLET ORAL DAILY
Qty: 60 TABLET | Refills: 0 | Status: SHIPPED | OUTPATIENT
Start: 2023-11-07 | End: 2023-12-15 | Stop reason: SDUPTHER

## 2023-11-08 ENCOUNTER — OFFICE VISIT (OUTPATIENT)
Dept: PRIMARY CARE CLINIC | Facility: CLINIC | Age: 78
End: 2023-11-08
Payer: MEDICARE

## 2023-11-08 VITALS
HEIGHT: 62 IN | WEIGHT: 233.69 LBS | DIASTOLIC BLOOD PRESSURE: 58 MMHG | SYSTOLIC BLOOD PRESSURE: 113 MMHG | HEART RATE: 105 BPM | BODY MASS INDEX: 43 KG/M2 | OXYGEN SATURATION: 97 % | TEMPERATURE: 99 F

## 2023-11-08 DIAGNOSIS — E66.01 MORBID OBESITY WITH BMI OF 40.0-44.9, ADULT: ICD-10-CM

## 2023-11-08 DIAGNOSIS — I10 ESSENTIAL HYPERTENSION: Chronic | ICD-10-CM

## 2023-11-08 DIAGNOSIS — N30.00 ACUTE CYSTITIS WITHOUT HEMATURIA: Primary | ICD-10-CM

## 2023-11-08 LAB
BILIRUB UR QL STRIP: NEGATIVE
CLARITY UR REFRACT.AUTO: CLEAR
COLOR UR AUTO: YELLOW
GLUCOSE UR QL STRIP: NEGATIVE
HGB UR QL STRIP: NEGATIVE
KETONES UR QL STRIP: NEGATIVE
LEUKOCYTE ESTERASE UR QL STRIP: ABNORMAL
MICROSCOPIC COMMENT: ABNORMAL
NITRITE UR QL STRIP: NEGATIVE
PH UR STRIP: 6 [PH] (ref 5–8)
PROT UR QL STRIP: NEGATIVE
RBC #/AREA URNS AUTO: 4 /HPF (ref 0–4)
SP GR UR STRIP: 1.01 (ref 1–1.03)
SQUAMOUS #/AREA URNS AUTO: 0 /HPF
URN SPEC COLLECT METH UR: ABNORMAL
WBC #/AREA URNS AUTO: 6 /HPF (ref 0–5)

## 2023-11-08 PROCEDURE — 3288F PR FALLS RISK ASSESSMENT DOCUMENTED: ICD-10-PCS | Mod: CPTII,S$GLB,, | Performed by: NURSE PRACTITIONER

## 2023-11-08 PROCEDURE — 1159F PR MEDICATION LIST DOCUMENTED IN MEDICAL RECORD: ICD-10-PCS | Mod: CPTII,S$GLB,, | Performed by: NURSE PRACTITIONER

## 2023-11-08 PROCEDURE — 1125F PR PAIN SEVERITY QUANTIFIED, PAIN PRESENT: ICD-10-PCS | Mod: CPTII,S$GLB,, | Performed by: NURSE PRACTITIONER

## 2023-11-08 PROCEDURE — 3074F PR MOST RECENT SYSTOLIC BLOOD PRESSURE < 130 MM HG: ICD-10-PCS | Mod: CPTII,S$GLB,, | Performed by: NURSE PRACTITIONER

## 2023-11-08 PROCEDURE — 1100F PTFALLS ASSESS-DOCD GE2>/YR: CPT | Mod: CPTII,S$GLB,, | Performed by: NURSE PRACTITIONER

## 2023-11-08 PROCEDURE — 87086 URINE CULTURE/COLONY COUNT: CPT | Performed by: NURSE PRACTITIONER

## 2023-11-08 PROCEDURE — 99999 PR PBB SHADOW E&M-EST. PATIENT-LVL V: CPT | Mod: PBBFAC,,, | Performed by: NURSE PRACTITIONER

## 2023-11-08 PROCEDURE — 3074F SYST BP LT 130 MM HG: CPT | Mod: CPTII,S$GLB,, | Performed by: NURSE PRACTITIONER

## 2023-11-08 PROCEDURE — 3078F DIAST BP <80 MM HG: CPT | Mod: CPTII,S$GLB,, | Performed by: NURSE PRACTITIONER

## 2023-11-08 PROCEDURE — 1100F PR PT FALLS ASSESS DOC 2+ FALLS/FALL W/INJURY/YR: ICD-10-PCS | Mod: CPTII,S$GLB,, | Performed by: NURSE PRACTITIONER

## 2023-11-08 PROCEDURE — 99214 PR OFFICE/OUTPT VISIT, EST, LEVL IV, 30-39 MIN: ICD-10-PCS | Mod: S$GLB,,, | Performed by: NURSE PRACTITIONER

## 2023-11-08 PROCEDURE — 99214 OFFICE O/P EST MOD 30 MIN: CPT | Mod: S$GLB,,, | Performed by: NURSE PRACTITIONER

## 2023-11-08 PROCEDURE — 81001 URINALYSIS AUTO W/SCOPE: CPT | Performed by: NURSE PRACTITIONER

## 2023-11-08 PROCEDURE — 3288F FALL RISK ASSESSMENT DOCD: CPT | Mod: CPTII,S$GLB,, | Performed by: NURSE PRACTITIONER

## 2023-11-08 PROCEDURE — 3078F PR MOST RECENT DIASTOLIC BLOOD PRESSURE < 80 MM HG: ICD-10-PCS | Mod: CPTII,S$GLB,, | Performed by: NURSE PRACTITIONER

## 2023-11-08 PROCEDURE — 99999 PR PBB SHADOW E&M-EST. PATIENT-LVL V: ICD-10-PCS | Mod: PBBFAC,,, | Performed by: NURSE PRACTITIONER

## 2023-11-08 PROCEDURE — 1159F MED LIST DOCD IN RCRD: CPT | Mod: CPTII,S$GLB,, | Performed by: NURSE PRACTITIONER

## 2023-11-08 PROCEDURE — 1125F AMNT PAIN NOTED PAIN PRSNT: CPT | Mod: CPTII,S$GLB,, | Performed by: NURSE PRACTITIONER

## 2023-11-08 NOTE — PROGRESS NOTES
Subjective:       Patient ID: Kaylee Romero is a 78 y.o. female.    Chief Complaint: Urinary Frequency    Ms. Kaylee Romero is a 78 year old female, new to me, presents to the clinic with complaints of urinary frequency and urgency. PCP is Liz Roberts. Medical and surgical history in addition to problem list reviewed as listed below.     Urinary Frequency   This is a new problem. The current episode started in the past 7 days. The problem occurs every urination. The problem has been unchanged. The pain is at a severity of 0/10. There has been no fever. She is Not sexually active. There is No history of pyelonephritis. Associated symptoms include frequency and urgency. Pertinent negatives include no discharge, flank pain, hematuria, nausea, vomiting or bubble bath use.     Past Medical History:   Diagnosis Date    Age-related osteoporosis without current pathological fracture 2019    Anemia     Cataract     CKD (chronic kidney disease) stage 3, GFR 30-59 ml/min     Coronary artery disease     Dry mouth     History of TIA (transient ischemic attack) 2018    Hyperlipidemia 2014    Hypertension     Hypertensive heart disease with diastolic heart failure 2012    Morbid obesity with body mass index (BMI) of 40.0 or higher 2016    KAMRAN (obstructive sleep apnea)     KAMRAN on CPAP 2016    Osteoporosis without current pathological fracture 2018    Physical deconditioning 2018    Status post total left knee replacement 2017    Type 2 diabetes mellitus with stage 3 chronic kidney disease, without long-term current use of insulin 2019        Past Surgical History:   Procedure Laterality Date    ankle surgery (l) Left     APPENDECTOMY       SECTION      CORONARY ARTERY BYPASS GRAFT  09/2019    x 2    CORONARY ARTERY BYPASS GRAFT (CABG) N/A 2019    Procedure: CORONARY ARTERY BYPASS GRAFT (CABG)X3;  Surgeon: Peterson Ventura MD;  Location:  [facility] OR [facility] FLR;  Service: Cardiovascular;  Laterality: N/A;    CORONARY BYPASS GRAFT ANGIOGRAPHY  10/18/2021    Procedure: Bypass graft study;  Surgeon: Jamar Haddad MD;  Location: [facility] CATH LAB;  Service: Cardiology;;    DILATION AND CURETTAGE OF UTERUS      ENDOSCOPIC HARVEST OF VEIN Left 08/12/2019    Procedure: SURGICAL PROCUREMENT, VEIN, ENDOSCOPIC;  Surgeon: Peterson Ventura MD;  [address];  Service: Cardiovascular;  Laterality: Left;  Vein [place] Start: 0843; End: 1054   Vein Prep Start: 1055; End: 1145    JOINT REPLACEMENT Left     knee replacement    KNEE ARTHROSCOPY W/ DEBRIDEMENT      KNEE SURGERY Left 05/01/2017    TKR    LEFT HEART CATHETERIZATION Left 07/09/2019    Procedure: Left heart cath;  Surgeon: Ronak Gibbons MD;  Location: [facility] CATH LAB;  Service: Cardiology;  Laterality: Left;    LEFT HEART CATHETERIZATION Left 10/18/2021    Procedure: Left heart cath;  Surgeon: Jamar Haddad MD;  Location: [facility] CATH LAB;  Service: Cardiology;  Laterality: Left;    TOTAL KNEE ARTHROPLASTY Right 3/9/2023    Procedure: ARTHROPLASTY, KNEE, TOTAL:RIGHT;  Surgeon: Peterson Sharma MD;  [address];  Service: Orthopedics;  Laterality: Right;        Family History   Problem Relation Age of Onset    Heart attack Mother     Heart disease Father     Stroke Father     Heart failure Father     Diabetes Father     Arrhythmia Father     No Known Problems Brother     Stroke Paternal Grandfather     No Known Problems Son     Breast cancer Neg Hx     Colon cancer Neg Hx     Ovarian cancer Neg Hx     Amblyopia Neg Hx     Blindness Neg Hx     Cancer Neg Hx     Cataracts Neg Hx     Glaucoma Neg Hx     Hypertension Neg Hx     Macular degeneration Neg Hx     Retinal detachment Neg Hx     Strabismus Neg Hx     Thyroid disease Neg Hx     Esophageal cancer Neg Hx        Social History     Tobacco Use   Smoking Status Never   Smokeless Tobacco Never       Social History     Social  "History Narrative     with a son living locally.  at [employer], Retired 5/18.       Review of patient's allergies indicates:   Allergen Reactions    Bactrim [sulfamethoxazole-trimethoprim] Other (See Comments)     Generic version  Sulfa makes her sick    Keflex [cephalexin] Other (See Comments)     Turns orange        Review of Systems   Constitutional:  Negative for fatigue and fever.   Respiratory:  Negative for chest tightness and shortness of breath.    Cardiovascular:  Negative for chest pain and palpitations.   Gastrointestinal:  Negative for abdominal pain, nausea and vomiting.   Genitourinary:  Positive for frequency, pelvic pain and urgency. Negative for dysuria, flank pain, hematuria, vaginal bleeding, vaginal discharge and vaginal pain.   Musculoskeletal:  Positive for arthralgias, back pain, gait problem and myalgias.   Neurological:  Negative for dizziness, light-headedness and headaches.         Objective:      Vitals:    11/08/23 1516   BP: (!) 113/58   BP Location: Left arm   Patient Position: Sitting   BP Method: Large (Automatic)   Pulse: 105   Temp: 98.5 °F (36.9 °C)   TempSrc: Oral   SpO2: 97%   Weight: 106 kg (233 lb 11 oz)   Height: 5' 2" (1.575 m)      Physical Exam  Constitutional:       Appearance: She is obese.   Pulmonary:      Effort: Pulmonary effort is normal. No respiratory distress.   Musculoskeletal:         General: Tenderness and deformity present.      Cervical back: Tenderness present.      Right lower leg: Edema present.      Left lower leg: Edema present.   Skin:     Capillary Refill: Capillary refill takes more than 3 seconds.   Neurological:      Mental Status: She is alert and oriented to person, place, and time.         Assessment:       1. Acute cystitis without hematuria    2. Essential hypertension    3. Morbid obesity with BMI of 40.0-44.9, adult        Plan:       Acute cystitis without hematuria  Rule out UTI  -     Urinalysis  -     " "CULTURE, URINE    Essential hypertension  Controlled with amlodipine, carvedilol.  Denies HA, SOB, blurry vision, chest pain/discomfort.    Morbid obesity with BMI of 40.0-44.9, adult  Recommend heart healthy diet, exercise 3 times a week for 30 minute intervals, increase as tolerated.      Medication List with Changes/Refills   Current Medications    ACETAMINOPHEN (TYLENOL) 500 MG TABLET    Take 500 mg by mouth 2 (two) times daily as needed for Pain.    ALENDRONATE (FOSAMAX) 70 MG TABLET    Take 1 tablet (70 mg total) by mouth every 7 days.    AMLODIPINE (NORVASC) 10 MG TABLET    TAKE 1 TABLET(10 MG) BY MOUTH EVERY DAY    AMMONIUM LACTATE (LAC-HYDRIN) 12 % LOTION    Apply topically as needed for Dry Skin. On the feet and toenails.    ASPIRIN (ECOTRIN) 81 MG EC TABLET    Take 1 tablet (81 mg total) by mouth 2 (two) times a day.    ATORVASTATIN (LIPITOR) 80 MG TABLET    TAKE 1 TABLET(80 MG) BY MOUTH EVERY DAY    BD BOO 2ND GEN PEN NEEDLE 32 GAUGE X 5/32" NDLE    To injection daily    BLOOD SUGAR DIAGNOSTIC STRP    1 strip by Misc.(Non-Drug; Combo Route) route once daily.    BLOOD-GLUCOSE METER,CONTINUOUS (DEXCOM G7 ) MISC    To use with sensor    BLOOD-GLUCOSE SENSOR (DEXCOM G7 SENSOR) JOHANNA    To change every 10 days    CARVEDILOL (COREG) 25 MG TABLET    TAKE 1 TABLET(25 MG) BY MOUTH TWICE DAILY WITH MEALS    CLOPIDOGREL (PLAVIX) 75 MG TABLET    Take 1 tablet (75 mg total) by mouth once daily.    COD LIVER OIL ORAL    TAKE 2 CAPSULES BY MOUTH EVERY MORNING AND 1 CAPSULE EVERY EVENING    CYCLOBENZAPRINE (FLEXERIL) 10 MG TABLET    Take 1/2 tablet by mouth three times a day as needed for muscle spasms    DICLOFENAC SODIUM (VOLTAREN) 1 % GEL    Apply 2 g topically 4 (four) times daily as needed. To painful area on the feet.    DIPHENOXYLATE-ATROPINE 2.5-0.025 MG (LOMOTIL) 2.5-0.025 MG PER TABLET    TAKE 1 TABLET BY MOUTH FOUR TIMES DAILY AS NEEDED    DULAGLUTIDE (TRULICITY) 4.5 MG/0.5 ML PEN INJECTOR    Inject " 4.5 mg into the skin every 7 days.    FERROUS SULFATE (FEROSUL) 325 MG (65 MG IRON) TAB TABLET    Take 1 tablet (325 mg total) by mouth once daily.    FUROSEMIDE (LASIX) 20 MG TABLET    TAKE 1 TABLET BY MOUTH ON SATURDAY, SUNDAY, MONDAY, WEDNESDAY AND FRIDAY    GABAPENTIN (NEURONTIN) 100 MG CAPSULE    Take 1 capsule (100 mg total) by mouth 2 (two) times daily.    GLIPIZIDE (GLUCOTROL) 5 MG TABLET    Take 2 tablets (10 mg total) by mouth 2 (two) times daily with meals.    GLUCAGON (GVOKE HYPOPEN 2-PACK) 1 MG/0.2 ML ATIN    Inject 1 Package into the skin as needed.    INSULIN GLARGINE,HUM.REC.ANLOG (BASAGLAR KWIKPEN U-100 INSULIN SUBQ)    Inject 22 Units into the skin every evening.    IRBESARTAN (AVAPRO) 300 MG TABLET    TAKE 1 TABLET(300 MG) BY MOUTH EVERY EVENING    ISOSORBIDE MONONITRATE (IMDUR) 30 MG 24 HR TABLET    TAKE 2 TABLETS(60 MG) BY MOUTH EVERY DAY    LANCETS MISC    1 lancet by Misc.(Non-Drug; Combo Route) route once daily.    MULTIVITAMIN (THERAGRAN) PER TABLET    Take 1 tablet by mouth once daily.    NITROGLYCERIN (NITROSTAT) 0.4 MG SL TABLET    Place 1 tablet (0.4 mg total) under the tongue every 5 (five) minutes as needed for Chest pain.    VITAMIN D (VITAMIN D3) 1000 UNITS TAB    Take 1,000 Units by mouth twice a week. Monday AND THURSDAYS ONLY   Discontinued Medications    ACETAMINOPHEN (TYLENOL) 500 MG TABLET    Take 1 tablet (500 mg total) by mouth every 6 (six) hours as needed for Pain.    AMOXICILLIN (AMOXIL) 500 MG CAPSULE    TAKE ALL 4 CAPSULES 1 HOUR PRIOR TO APPOINTMENT    OXYCODONE-ACETAMINOPHEN (PERCOCET) 5-325 MG PER TABLET    Take 1 tablet by mouth every 8 (eight) hours as needed for Pain.      Follow up if symptoms worsen or fail to improve.    I spent a total of 35 minutes on the day of the visit.This includes face to face time and non-face to face time preparing to see the patient (eg, review of tests), obtaining and/or reviewing separately obtained history, documenting clinical  information in the electronic or other health record, independently interpreting results and communicating results to the patient/family/caregiver, or care coordinator.     Oneida Dennis APRN, MSN, FNP-C

## 2023-11-09 LAB — BACTERIA UR CULT: NO GROWTH

## 2023-11-10 ENCOUNTER — PATIENT MESSAGE (OUTPATIENT)
Dept: PRIMARY CARE CLINIC | Facility: CLINIC | Age: 78
End: 2023-11-10
Payer: MEDICARE

## 2023-11-10 ENCOUNTER — DOCUMENTATION ONLY (OUTPATIENT)
Dept: CARDIOLOGY | Facility: HOSPITAL | Age: 78
End: 2023-11-10
Payer: MEDICARE

## 2023-11-10 NOTE — PROGRESS NOTES
Established Patient - Audio Only Telehealth Visit     The patient location is: home  The chief complaint leading to consultation is: MRI results  Visit type: Virtual visit with audio only (telephone)  Total time spent with patient: 15min     The reason for the audio only service rather than synchronous audio and video virtual visit was related to technical difficulties or patient preference/necessity.     Each patient to whom I provide medical services by telemedicine is:  (1) informed of the relationship between the physician and patient and the respective role of any other health care provider with respect to management of the patient; and (2) notified that they may decline to receive medical services by telemedicine and may withdraw from such care at any time. Patient verbally consented to receive this service via voice-only telephone call.    DATE: 11/27/2023  PATIENT: Kaylee Romero    Attending Physician: Winston Mccartney M.D.    HISTORY:  Kaylee Romero is a 78 y.o. female who returns to me today for MRI results and f/u after a Left TF ASHELY on 11/15/23 which gave 90% relief in her left leg pain.  She was last seen by me 10/31/2023.  Today she is doing well but notes left leg weakness.  The patient endorses myelopathic symptoms such as handwriting changes or difficulty with buttons/coins/keys. Denies perineal paresthesias, bowel/bladder dysfunction.    PMH/PSH/FamHx/SocHx:  Unchanged from prior visit    ROS:  REVIEW OF SYSTEMS:  Constitution: Negative. Negative for chills, fever and night sweats.   HENT: Negative for congestion and headaches.    Eyes: Negative for blurred vision, left vision loss and right vision loss.   Cardiovascular: Negative for chest pain and syncope.   Respiratory: Negative for cough and shortness of breath.    Endocrine: Negative for polydipsia, polyphagia and polyuria.   Hematologic/Lymphatic: Negative for bleeding problem. Does not bruise/bleed easily.   Skin: Negative for dry  skin, itching and rash.   Musculoskeletal: Negative for falls and muscle weakness.   Gastrointestinal: Negative for abdominal pain and bowel incontinence.   Allergic/Immunologic: Negative for hives and persistent infections.  Genitourinary: Negative for urinary retention/incontinence and nocturia.   Neurological: negative for disturbances in coordination, no myelopathic symptoms such as handwriting changes or difficulty with buttons, coins, keys or small objects. No loss of balance and seizures.   Psychiatric/Behavioral: Negative for depression. The patient does not have insomnia.   Denies myelopathic symptoms, perineal paresthesias, bowel or bladder incontinence    EXAM:  LMP 01/09/2001 (Approximate)   Stable.     IMAGING:    Today I personally re- reviewed AP, Lat and Flex/Ex  upright L-spine that demonstrate mild anterolisthesis of L4 in relation to L5. Moderate DDD throughout.       Lumbar MRI shows bilateral foraminal narrowing from L4-S1 with a left paracentral disc protrusion at L5/S1. No significant stenosis.      Hip MRI shows suspected tear involving the anterior cartilaginous labrum with markedly frayed margins on a degenerative basis. Chronic degenerative changes of the left hip articulation with minimal surface fraying and cartilage particularly along the the apical surface.    Thoracic MRI shows mild foramina narrowing and stenosis from T1-3.    Cervical MRI shows moderate stenosis from C4-7 with multilevel foraminal narrowing.     There is no height or weight on file to calculate BMI.    Hemoglobin A1C   Date Value Ref Range Status   07/18/2023 6.3 (H) 4.0 - 5.6 % Final     Comment:     ADA Screening Guidelines:  5.7-6.4%  Consistent with prediabetes  >or=6.5%  Consistent with diabetes    High levels of fetal hemoglobin interfere with the HbA1C  assay. Heterozygous hemoglobin variants (HbS, HgC, etc)do  not significantly interfere with this assay.   However, presence of multiple variants may affect  accuracy.     02/16/2023 5.9 (H) 4.0 - 5.6 % Final     Comment:     ADA Screening Guidelines:  5.7-6.4%  Consistent with prediabetes  >or=6.5%  Consistent with diabetes    High levels of fetal hemoglobin interfere with the HbA1C  assay. Heterozygous hemoglobin variants (HbS, HgC, etc)do  not significantly interfere with this assay.   However, presence of multiple variants may affect accuracy.     09/29/2022 6.2 (H) 4.0 - 5.6 % Final     Comment:     ADA Screening Guidelines:  5.7-6.4%  Consistent with prediabetes  >or=6.5%  Consistent with diabetes    High levels of fetal hemoglobin interfere with the HbA1C  assay. Heterozygous hemoglobin variants (HbS, HgC, etc)do  not significantly interfere with this assay.   However, presence of multiple variants may affect accuracy.           ASSESSMENT/PLAN:    Diagnoses and all orders for this visit:    DDD (degenerative disc disease), cervical  -     X-Ray Cervical Spine AP Lat with Flex Ex; Future    Cervical myelopathy  -     Case Request Operating Room: DISCECTOMY, SPINE, CERVICAL, ANTERIOR APPROACH, WITH FUSION  C4-7  [facility]  SNS:vocal cord/motors/SSEP    Due to her myelopathy we will move forward with a C4-7 ACDF on 1/30/24.      This service was not originating from a related E/M service provided within the previous 7 days nor will  to an E/M service or procedure within the next 24 hours or my soonest available appointment.  Prevailing standard of care was able to be met in this audio-only visit.

## 2023-11-11 NOTE — PROGRESS NOTES
RCRI 3 - Class 4 risk - 15% risk of death, MI or cardiac arrest at 30 days    [place] 1.5% risk of MI or cardiac arrest intraoperatively or up to 30 days.    Spoke on the phone with Ms. Romero who may require an upcoming orthopedic procedure. She has had 3 falls in 6 weeks. She continues to be independent and ambulates using a walker. She has no angina and does not require the use of PRN NTG. She is medically optimized and further cardiovascular testing is not recommended, should the patient require surgery in the near future, absent a clinical change.

## 2023-11-13 ENCOUNTER — TELEPHONE (OUTPATIENT)
Dept: ENDOCRINOLOGY | Facility: CLINIC | Age: 78
End: 2023-11-13
Payer: MEDICARE

## 2023-11-13 NOTE — TELEPHONE ENCOUNTER
Reached out to patient about  the preferred appointment and her appointment that's scheduled.    pt would like an an appt on 11-15-23 preferably after 2pm in regards to downloading info to [facility] for pt as well as her . Please c/b to advise..Thanks

## 2023-11-14 ENCOUNTER — PATIENT MESSAGE (OUTPATIENT)
Dept: ENDOCRINOLOGY | Facility: CLINIC | Age: 78
End: 2023-11-14
Payer: MEDICARE

## 2023-11-14 DIAGNOSIS — N18.31 TYPE 2 DIABETES MELLITUS WITH STAGE 3A CHRONIC KIDNEY DISEASE, WITH LONG-TERM CURRENT USE OF INSULIN: Primary | ICD-10-CM

## 2023-11-14 DIAGNOSIS — E11.22 TYPE 2 DIABETES MELLITUS WITH STAGE 3A CHRONIC KIDNEY DISEASE, WITH LONG-TERM CURRENT USE OF INSULIN: Primary | ICD-10-CM

## 2023-11-14 DIAGNOSIS — Z79.4 TYPE 2 DIABETES MELLITUS WITH STAGE 3A CHRONIC KIDNEY DISEASE, WITH LONG-TERM CURRENT USE OF INSULIN: Primary | ICD-10-CM

## 2023-11-15 ENCOUNTER — TELEPHONE (OUTPATIENT)
Dept: DIABETES | Facility: CLINIC | Age: 78
End: 2023-11-15
Payer: MEDICARE

## 2023-11-15 ENCOUNTER — TELEPHONE (OUTPATIENT)
Dept: ENDOCRINOLOGY | Facility: CLINIC | Age: 78
End: 2023-11-15
Payer: MEDICARE

## 2023-11-15 ENCOUNTER — HOSPITAL ENCOUNTER (OUTPATIENT)
Facility: OTHER | Age: 78
Discharge: HOME | End: 2023-11-15
Attending: ANESTHESIOLOGY | Admitting: ANESTHESIOLOGY
Payer: MEDICARE

## 2023-11-15 VITALS
HEART RATE: 92 BPM | WEIGHT: 235 LBS | RESPIRATION RATE: 16 BRPM | TEMPERATURE: 98 F | SYSTOLIC BLOOD PRESSURE: 115 MMHG | HEIGHT: 62 IN | BODY MASS INDEX: 43.24 KG/M2 | DIASTOLIC BLOOD PRESSURE: 53 MMHG | OXYGEN SATURATION: 98 %

## 2023-11-15 DIAGNOSIS — M51.36 DDD (DEGENERATIVE DISC DISEASE), LUMBAR: Primary | ICD-10-CM

## 2023-11-15 DIAGNOSIS — M54.17 LUMBOSACRAL RADICULOPATHY: ICD-10-CM

## 2023-11-15 DIAGNOSIS — G89.29 CHRONIC PAIN: ICD-10-CM

## 2023-11-15 LAB — POCT GLUCOSE: 105 MG/DL (ref 70–110)

## 2023-11-15 PROCEDURE — 64484 NJX AA&/STRD TFRM EPI L/S EA: CPT | Mod: LT | Performed by: ANESTHESIOLOGY

## 2023-11-15 PROCEDURE — 25000003 PHARM REV CODE 250: Performed by: ANESTHESIOLOGY

## 2023-11-15 PROCEDURE — 64484 NJX AA&/STRD TFRM EPI L/S EA: CPT | Mod: LT,,, | Performed by: ANESTHESIOLOGY

## 2023-11-15 PROCEDURE — 64483 PR EPIDURAL INJ, ANES/STEROID, TRANSFORAMINAL, LUMB/SACR, SNGL LEVL: ICD-10-PCS | Mod: LT,,, | Performed by: ANESTHESIOLOGY

## 2023-11-15 PROCEDURE — 64483 NJX AA&/STRD TFRM EPI L/S 1: CPT | Mod: LT,,, | Performed by: ANESTHESIOLOGY

## 2023-11-15 PROCEDURE — 64483 NJX AA&/STRD TFRM EPI L/S 1: CPT | Mod: LT | Performed by: ANESTHESIOLOGY

## 2023-11-15 PROCEDURE — 64484 PRA INJECT ANES/STEROID FORAMEN LUMBAR/SACRAL W IMG GUIDE ,EA ADD LEVEL: ICD-10-PCS | Mod: LT,,, | Performed by: ANESTHESIOLOGY

## 2023-11-15 PROCEDURE — 63600175 PHARM REV CODE 636 W HCPCS: Performed by: ANESTHESIOLOGY

## 2023-11-15 RX ORDER — MIDAZOLAM HYDROCHLORIDE 1 MG/ML
INJECTION INTRAMUSCULAR; INTRAVENOUS
Status: DISCONTINUED | OUTPATIENT
Start: 2023-11-15 | End: 2023-11-15 | Stop reason: HOSPADM

## 2023-11-15 RX ORDER — SODIUM CHLORIDE 9 MG/ML
INJECTION, SOLUTION INTRAVENOUS CONTINUOUS
Status: ACTIVE | OUTPATIENT
Start: 2023-11-15

## 2023-11-15 RX ORDER — FENTANYL CITRATE 50 UG/ML
INJECTION, SOLUTION INTRAMUSCULAR; INTRAVENOUS
Status: DISCONTINUED | OUTPATIENT
Start: 2023-11-15 | End: 2023-11-15 | Stop reason: HOSPADM

## 2023-11-15 RX ORDER — DEXAMETHASONE SODIUM PHOSPHATE 10 MG/ML
INJECTION INTRAMUSCULAR; INTRAVENOUS
Status: DISCONTINUED | OUTPATIENT
Start: 2023-11-15 | End: 2023-11-15 | Stop reason: HOSPADM

## 2023-11-15 RX ORDER — LIDOCAINE HYDROCHLORIDE 20 MG/ML
INJECTION, SOLUTION INFILTRATION; PERINEURAL
Status: DISCONTINUED | OUTPATIENT
Start: 2023-11-15 | End: 2023-11-15 | Stop reason: HOSPADM

## 2023-11-15 RX ORDER — LIDOCAINE HYDROCHLORIDE 10 MG/ML
INJECTION, SOLUTION EPIDURAL; INFILTRATION; INTRACAUDAL; PERINEURAL
Status: DISCONTINUED | OUTPATIENT
Start: 2023-11-15 | End: 2023-11-15 | Stop reason: HOSPADM

## 2023-11-15 NOTE — OP NOTE
Lumbar Transforaminal Epidural Steroid Injection under Fluoroscopic Guidance    The procedure, risks, benefits, and options were discussed with the patient. There are no contraindications to the procedure. The patent expressed understanding and agreed to the procedure. Informed written consent was obtained prior to the start of the procedure and can be found in the patient's chart.    PATIENT NAME: Mrs. Kaylee Romero   MRN: [id number]     DATE OF PROCEDURE: 11/15/2023    PROCEDURE:  Left  L2/3 and L3/4 Lumbar Transforaminal Epidural Steroid Injection under Fluoroscopic Guidance    PRE-OP DIAGNOSIS: Lumbar radiculopathy, chronic [M54.16] Lumbar radiculopathy [M54.16]    POST-OP DIAGNOSIS: Same    PHYSICIAN: Frank Ken MD    ASSISTANTS: Blake Fechtel, M.D. [facility] Fellow     MEDICATIONS INJECTED: Preservative-free Decadron 10mg with 5cc of Lidocaine 1% MPF     LOCAL ANESTHETIC INJECTED: Xylocaine 2%     SEDATION: Versed 2mg and Fentanyl 50mcg                                                                                                                                                                                     Conscious sedation ordered by M.D. Patient re-evaluation prior to administration of conscious sedation. No changes noted in patient's status from initial evaluation. The patient's vital signs were monitored by RN and patient remained hemodynamically stable throughout the procedure.    Event Time In   Sedation Start 1429   Sedation End 1436       ESTIMATED BLOOD LOSS: None    COMPLICATIONS: None    TECHNIQUE: Time-out was performed to identify the patient and procedure to be performed. With the patient laying in a prone position, the surgical area was prepped and draped in the usual sterile fashion using ChloraPrep and a fenestrated drape.The levels were determined under fluoroscopy guidance. Skin anesthesia was achieved by injecting Lidocaine 2% over the injection sites. The transforaminal  spaces were then approached with a 22 gauge, 5 inch spinal quinke needle that was introduced under fluoroscopic guidance in the AP and Lateral views. Once the needle tip was in the area of the transforaminal space, and there was no blood, CSF or paraesthesias, contrast dye Omnipaque (300mg/mL) was injected to confirm placement and there was no vascular runoff. Fluoroscopic imaging in the AP and lateral views revealed a clear outline of the spinal nerve with proximal spread of agent through the neural foramen into the epidural space. 3 mL of the medication mixture listed above was injected slowly at each site. Displacement of the radio opaque contrast after injection of the medication confirmed that the medication went into the area of the transforaminal spaces. The needles were removed and bleeding was nil. A sterile dressing was applied. No specimens collected. The patient tolerated the procedure well.       The patient was monitored after the procedure in the recovery area. They were given post-procedure and discharge instructions to follow at home. The patient was discharged in a stable condition.    Doug De Anda MD    I reviewed and edited the fellow's note. I conducted my own interview and physical examination. I agree with the findings. I was present and supervising all critical portions of the procedure.  Frank Ken MD

## 2023-11-15 NOTE — H&P
HPI  Patient presenting for Procedure(s) (LRB):  LUMBAR TRANSFORAMINAL LEFT L2/3 AND L3/4 DIRECT REFERRAL *PLAVIX CLEARANCE IN CHART* (Left)     Patient on Anti-coagulation No    No health changes since previous encounter    Past Medical History:   Diagnosis Date    Age-related osteoporosis without current pathological fracture 2019    Anemia     Cataract     CKD (chronic kidney disease) stage 3, GFR 30-59 ml/min     Coronary artery disease     Dry mouth     History of TIA (transient ischemic attack) 2018    Hyperlipidemia 2014    Hypertension     Hypertensive heart disease with diastolic heart failure 2012    Morbid obesity with body mass index (BMI) of 40.0 or higher 2016    KAMRAN (obstructive sleep apnea)     KAMRAN on CPAP 2016    Osteoporosis without current pathological fracture 2018    Physical deconditioning 2018    Status post total left knee replacement 2017    Type 2 diabetes mellitus with stage 3 chronic kidney disease, without long-term current use of insulin 2019     Past Surgical History:   Procedure Laterality Date    ankle surgery (l) Left     APPENDECTOMY       SECTION      CORONARY ARTERY BYPASS GRAFT  09/2019    x 2    CORONARY ARTERY BYPASS GRAFT (CABG) N/A 2019    Procedure: CORONARY ARTERY BYPASS GRAFT (CABG)X3;  Surgeon: Peterson Ventura MD;  Location: [facility] OR [address] [place] [address];  Service: Cardiovascular;  Laterality: N/A;    CORONARY BYPASS GRAFT ANGIOGRAPHY  10/18/2021    Procedure: Bypass graft study;  Surgeon: Jamar Haddad MD;  Location: [facility] CATH LAB;  Service: Cardiology;;    DILATION AND CURETTAGE OF UTERUS      ENDOSCOPIC HARVEST OF VEIN Left 2019    Procedure: SURGICAL PROCUREMENT, VEIN, ENDOSCOPIC;  Surgeon: Peterson Ventura MD;  Location: [facility] OR [address] [place] [address];  Service: Cardiovascular;  Laterality: Left;  Vein [place] Start: 08; End: 1054   Vein Prep Start: 1055; End: 1145    JOINT REPLACEMENT Left      "knee replacement    KNEE ARTHROSCOPY W/ DEBRIDEMENT      KNEE SURGERY Left 05/01/2017    TKR    LEFT HEART CATHETERIZATION Left 07/09/2019    Procedure: Left heart cath;  Surgeon: Ronak Gibbons MD;  Location: [facility] CATH LAB;  Service: Cardiology;  Laterality: Left;    LEFT HEART CATHETERIZATION Left 10/18/2021    Procedure: Left heart cath;  Surgeon: Jamar Haddad MD;  Location: [facility] CATH LAB;  Service: Cardiology;  Laterality: Left;    TOTAL KNEE ARTHROPLASTY Right 3/9/2023    Procedure: ARTHROPLASTY, KNEE, TOTAL:RIGHT;  Surgeon: Peterson Sharma MD;  Location: [facility] OR [facility] FLR;  Service: Orthopedics;  Laterality: Right;     Review of patient's allergies indicates:   Allergen Reactions    Bactrim [sulfamethoxazole-trimethoprim] Other (See Comments)     Generic version  Sulfa makes her sick    Keflex [cephalexin] Other (See Comments)     Turns orange      Current Facility-Administered Medications   Medication    0.9%  NaCl infusion       PMHx, PSHx, Allergies, Medications reviewed in epic    ROS negative except pain complaints in HPI    OBJECTIVE:    /67 (BP Location: Right arm, Patient Position: Lying)   Pulse 89   Temp 97.8 °F (36.6 °C) (Oral)   Resp 16   Ht 5' 2" (1.575 m)   Wt 106.6 kg (235 lb)   LMP 01/09/2001 (Approximate)   SpO2 96%   Breastfeeding No   BMI 42.98 kg/m²     PHYSICAL EXAMINATION:    GENERAL: Well appearing, in no acute distress, alert and oriented x3.  PSYCH:  Mood and affect appropriate.  SKIN: Skin color, texture, turgor normal, no rashes or lesions which will impact the procedure.  CV: RRR with palpation of the radial artery.  PULM: No evidence of respiratory difficulty, symmetric chest rise. Clear to auscultation.  NEURO: Notable weakness in her left lower extremity, hip flexors, 2/5 and 3/5 knee extension, 5/5 in ankle dorsiflexion and plantarflexion.  Ms. Romero also endorses pain to light touch in her left foot. Cranial nerves grossly intact.      Plan:    Proceed " with procedure as planned Procedure(s) (LRB):  LUMBAR TRANSFORAMINAL LEFT L2/3 AND L3/4 DIRECT REFERRAL *PLAVIX CLEARANCE IN CHART* (Left)    Neptali Serrato  11/15/2023

## 2023-11-15 NOTE — DISCHARGE INSTRUCTIONS

## 2023-11-15 NOTE — TELEPHONE ENCOUNTER
Returned patient phone call to scheduled a diabetes education but she refused after I told her there were no available appointments today.

## 2023-11-15 NOTE — H&P
HPI  Patient presenting for Procedure(s) (LRB):  LUMBAR TRANSFORAMINAL LEFT L2/3 AND L3/4 DIRECT REFERRAL *PLAVIX CLEARANCE IN CHART* (Left)     Patient on Anti-coagulation No    No health changes since previous encounter    Past Medical History:   Diagnosis Date    Age-related osteoporosis without current pathological fracture 2019    Anemia     Cataract     CKD (chronic kidney disease) stage 3, GFR 30-59 ml/min     Coronary artery disease     Dry mouth     History of TIA (transient ischemic attack) 2018    Hyperlipidemia 2014    Hypertension     Hypertensive heart disease with diastolic heart failure 2012    Morbid obesity with body mass index (BMI) of 40.0 or higher 2016    KAMRAN (obstructive sleep apnea)     KAMRAN on CPAP 2016    Osteoporosis without current pathological fracture 2018    Physical deconditioning 2018    Status post total left knee replacement 2017    Type 2 diabetes mellitus with stage 3 chronic kidney disease, without long-term current use of insulin 2019     Past Surgical History:   Procedure Laterality Date    ankle surgery (l) Left     APPENDECTOMY       SECTION      CORONARY ARTERY BYPASS GRAFT  09/2019    x 2    CORONARY ARTERY BYPASS GRAFT (CABG) N/A 2019    Procedure: CORONARY ARTERY BYPASS GRAFT (CABG)X3;  Surgeon: Peterson Ventura MD;  Location: [facility] OR [address] [place] [address];  Service: Cardiovascular;  Laterality: N/A;    CORONARY BYPASS GRAFT ANGIOGRAPHY  10/18/2021    Procedure: Bypass graft study;  Surgeon: Jamar Haddad MD;  Location: [facility] CATH LAB;  Service: Cardiology;;    DILATION AND CURETTAGE OF UTERUS      ENDOSCOPIC HARVEST OF VEIN Left 2019    Procedure: SURGICAL PROCUREMENT, VEIN, ENDOSCOPIC;  Surgeon: Peterson Ventura MD;  Location: [facility] OR [address] [place] [address];  Service: Cardiovascular;  Laterality: Left;  Vein [place] Start: 08; End: 1054   Vein Prep Start: 1055; End: 1145    JOINT REPLACEMENT Left      knee replacement    KNEE ARTHROSCOPY W/ DEBRIDEMENT      KNEE SURGERY Left 05/01/2017    TKR    LEFT HEART CATHETERIZATION Left 07/09/2019    Procedure: Left heart cath;  Surgeon: Ronak Gibbons MD;  Location: [facility] CATH LAB;  Service: Cardiology;  Laterality: Left;    LEFT HEART CATHETERIZATION Left 10/18/2021    Procedure: Left heart cath;  Surgeon: Jamar Haddad MD;  Location: [facility] CATH LAB;  Service: Cardiology;  Laterality: Left;    TOTAL KNEE ARTHROPLASTY Right 3/9/2023    Procedure: ARTHROPLASTY, KNEE, TOTAL:RIGHT;  Surgeon: Peterson Sharma MD;  Location: [facility] OR [facility] FLR;  Service: Orthopedics;  Laterality: Right;     Review of patient's allergies indicates:   Allergen Reactions    Bactrim [sulfamethoxazole-trimethoprim] Other (See Comments)     Generic version  Sulfa makes her sick    Keflex [cephalexin] Other (See Comments)     Turns orange      No current facility-administered medications for this encounter.       PMHx, PSHx, Allergies, Medications reviewed in epic    ROS negative except pain complaints in HPI    OBJECTIVE:    LMP 01/09/2001 (Approximate)   Breastfeeding No     PHYSICAL EXAMINATION:    GENERAL: Well appearing, in no acute distress, alert and oriented x3.  PSYCH:  Mood and affect appropriate.  SKIN: Skin color, texture, turgor normal, no rashes or lesions which will impact the procedure.  CV: RRR with palpation of the radial artery.  PULM: No evidence of respiratory difficulty, symmetric chest rise. Clear to auscultation.  NEURO: Cranial nerves grossly intact.    Plan:    Proceed with procedure as planned Procedure(s) (LRB):  LUMBAR TRANSFORAMINAL LEFT L2/3 AND L3/4 DIRECT REFERRAL *PLAVIX CLEARANCE IN CHART* (Left)    Doug De Anda MD  11/15/2023

## 2023-11-15 NOTE — DISCHARGE SUMMARY
"Discharge Note  Short Stay      SUMMARY     Admit Date: 11/15/2023    Attending Physician: Neptali Serrato      Discharge Physician: Neptali Serrato      Discharge Date: 11/15/2023 2:46 PM    Procedure(s) (LRB):  LUMBAR TRANSFORAMINAL LEFT L2/3 AND L3/4 DIRECT REFERRAL *PLAVIX CLEARANCE IN CHART* (Left)    Final Diagnosis: Lumbar radiculopathy, chronic [M54.16]    Disposition: Home or self care    Patient Instructions:   Current Discharge Medication List        CONTINUE these medications which have NOT CHANGED    Details   acetaminophen (TYLENOL) 500 MG tablet Take 500 mg by mouth 2 (two) times daily as needed for Pain.      alendronate (FOSAMAX) 70 MG tablet Take 1 tablet (70 mg total) by mouth every 7 days.  Qty: 12 tablet, Refills: 0    Associated Diagnoses: Osteoporosis without current pathological fracture, unspecified osteoporosis type      amLODIPine (NORVASC) 10 MG tablet TAKE 1 TABLET(10 MG) BY MOUTH EVERY DAY  Qty: 90 tablet, Refills: 2    Associated Diagnoses: Hypertensive heart disease without heart failure      ammonium lactate (LAC-HYDRIN) 12 % lotion Apply topically as needed for Dry Skin. On the feet and toenails.  Qty: 225 g, Refills: 6    Associated Diagnoses: Dry skin; Corn or callus      aspirin (ECOTRIN) 81 MG EC tablet Take 1 tablet (81 mg total) by mouth 2 (two) times a day.  Qty: 60 tablet, Refills: 0      atorvastatin (LIPITOR) 80 MG tablet TAKE 1 TABLET(80 MG) BY MOUTH EVERY DAY  Qty: 90 tablet, Refills: 0    Associated Diagnoses: Coronary 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 pectoris      BD BOO 2ND GEN PEN NEEDLE 32 gauge x 5/32" Ndle To injection daily  Qty: 100 each, Refills: 8    Associated Diagnoses: Type 2 diabetes mellitus with stage 3a chronic kidney disease, without long-term current use of insulin      blood sugar diagnostic Strp 1 strip by Misc.(Non-Drug; Combo Route) route once daily.  Qty: 100 strip, Refills: 3    Associated Diagnoses: Type 2 " diabetes mellitus with macroalbuminuric diabetic nephropathy      blood-glucose meter,continuous (DEXCOM G7 ) Misc To use with sensor  Qty: 1 each, Refills: 0    Associated Diagnoses: Type 2 diabetes mellitus with stage 3a chronic kidney disease, with long-term current use of insulin      blood-glucose sensor (DEXCOM G7 SENSOR) Areli To change every 10 days  Qty: 3 each, Refills: 3    Associated Diagnoses: Type 2 diabetes mellitus with stage 3a chronic kidney disease, with long-term current use of insulin      carvediloL (COREG) 25 MG tablet TAKE 1 TABLET(25 MG) BY MOUTH TWICE DAILY WITH MEALS  Qty: 180 tablet, Refills: 2    Associated Diagnoses: Hypertensive heart disease without heart failure      clopidogreL (PLAVIX) 75 mg tablet Take 1 tablet (75 mg total) by mouth once daily.  Qty: 90 tablet, Refills: 3    Associated Diagnoses: Coronary 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 pectoris      COD LIVER OIL ORAL TAKE 2 CAPSULES BY MOUTH EVERY MORNING AND 1 CAPSULE EVERY EVENING      cyclobenzaprine (FLEXERIL) 10 MG tablet Take 1/2 tablet by mouth three times a day as needed for muscle spasms  Qty: 90 tablet, Refills: 1      diclofenac sodium (VOLTAREN) 1 % Gel Apply 2 g topically 4 (four) times daily as needed. To painful area on the feet.  Qty: 500 g, Refills: 5    Comments: 5 x 100g tubes  Associated Diagnoses: Left foot pain; Contusion of dorsum of foot      diphenoxylate-atropine 2.5-0.025 mg (LOMOTIL) 2.5-0.025 mg per tablet TAKE 1 TABLET BY MOUTH FOUR TIMES DAILY AS NEEDED  Qty: 30 tablet, Refills: 0    Associated Diagnoses: Irritable bowel syndrome with diarrhea      dulaglutide (TRULICITY) 4.5 mg/0.5 mL pen injector Inject 4.5 mg into the skin every 7 days.  Qty: 2 mL, Refills: 3    Associated Diagnoses: Type 2 diabetes mellitus with stage 3 chronic kidney disease and hypertension      ferrous sulfate (FEROSUL) 325 mg (65 mg iron) Tab tablet Take 1 tablet (325 mg total) by  mouth once daily.  Qty: 60 tablet, Refills: 0    Associated Diagnoses: Normocytic anemia      furosemide (LASIX) 20 MG tablet TAKE 1 TABLET BY MOUTH ON SATURDAY, SUNDAY, MONDAY, WEDNESDAY AND FRIDAY  Qty: 30 tablet, Refills: 3      gabapentin (NEURONTIN) 100 MG capsule Take 1 capsule (100 mg total) by mouth 2 (two) times daily.  Qty: 90 capsule, Refills: 0      glipiZIDE (GLUCOTROL) 5 MG tablet Take 2 tablets (10 mg total) by mouth 2 (two) times daily with meals.  Qty: 360 tablet, Refills: 3    Associated Diagnoses: Type 2 diabetes mellitus with stage 3a chronic kidney disease, with long-term current use of insulin      glucagon (GVOKE HYPOPEN 2-PACK) 1 mg/0.2 mL AtIn Inject 1 Package into the skin as needed.  Qty: 2 Syringe, Refills: 0    Associated Diagnoses: Type 2 diabetes mellitus with stage 3 chronic kidney disease and hypertension      insulin glargine,hum.rec.anlog (BASAGLAR KWIKPEN U-100 INSULIN SUBQ) Inject 22 Units into the skin every evening.      irbesartan (AVAPRO) 300 MG tablet TAKE 1 TABLET(300 MG) BY MOUTH EVERY EVENING  Qty: 90 tablet, Refills: 2    Associated Diagnoses: Hypertensive heart disease without heart failure      isosorbide mononitrate (IMDUR) 30 MG 24 hr tablet TAKE 2 TABLETS(60 MG) BY MOUTH EVERY DAY  Qty: 180 tablet, Refills: 3      lancets Misc 1 lancet by Misc.(Non-Drug; Combo Route) route once daily.  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      nitroGLYCERIN (NITROSTAT) 0.4 MG SL tablet Place 1 tablet (0.4 mg total) under the tongue every 5 (five) minutes as needed for Chest pain.  Qty: 25 tablet, Refills: 6    Associated Diagnoses: Coronary artery disease involving coronary bypass graft of native heart without angina pectoris      vitamin D (VITAMIN D3) 1000 units Tab Take 1,000 Units by mouth twice a week. Monday AND THURSDAYS  ONLY                 Discharge Diagnosis: Lumbar radiculopathy, chronic [M54.16]  Condition on Discharge: Stable with no complications to procedure. Ms. Romero's strength and sensory exam was unchanged compared to before the procedure.  Diet on Discharge: Same as before.  Activity: as per instruction sheet.  Discharge to: Home with a responsible adult.  Follow up: 2-4 weeks       Please call my office or pager at [phone] if experienced any weakness or loss of sensation, fever > 101.5, pain uncontrolled with oral medications, persistent nausea/vomiting/or diarrhea, redness or drainage from the incisions, or any other worrisome concerns. If physician on call was not reached or could not communicate with our office for any reason please go to the nearest emergency department

## 2023-11-16 ENCOUNTER — PATIENT MESSAGE (OUTPATIENT)
Dept: ORTHOPEDICS | Facility: CLINIC | Age: 78
End: 2023-11-16
Payer: MEDICARE

## 2023-11-24 ENCOUNTER — HOSPITAL ENCOUNTER (OUTPATIENT)
Dept: RADIOLOGY | Facility: HOSPITAL | Age: 78
Discharge: HOME | End: 2023-11-24
Attending: REGISTERED NURSE
Payer: MEDICARE

## 2023-11-24 DIAGNOSIS — R53.1 WEAKNESS: ICD-10-CM

## 2023-11-24 PROCEDURE — 72141 MRI CERVICAL SPINE WITHOUT CONTRAST: ICD-10-PCS | Mod: 26,,, | Performed by: RADIOLOGY

## 2023-11-24 PROCEDURE — 72141 MRI NECK SPINE W/O DYE: CPT | Mod: TC

## 2023-11-24 PROCEDURE — 72146 MRI CHEST SPINE W/O DYE: CPT | Mod: TC

## 2023-11-24 PROCEDURE — 72146 MRI THORACIC SPINE WITHOUT CONTRAST: ICD-10-PCS | Mod: 26,,, | Performed by: RADIOLOGY

## 2023-11-24 PROCEDURE — 72141 MRI NECK SPINE W/O DYE: CPT | Mod: 26,,, | Performed by: RADIOLOGY

## 2023-11-24 PROCEDURE — 72146 MRI CHEST SPINE W/O DYE: CPT | Mod: 26,,, | Performed by: RADIOLOGY

## 2023-11-27 ENCOUNTER — TELEPHONE (OUTPATIENT)
Dept: GASTROENTEROLOGY | Facility: CLINIC | Age: 78
End: 2023-11-27
Payer: MEDICARE

## 2023-11-27 ENCOUNTER — TELEPHONE (OUTPATIENT)
Dept: ORTHOPEDICS | Facility: CLINIC | Age: 78
End: 2023-11-27
Payer: MEDICARE

## 2023-11-27 ENCOUNTER — OFFICE VISIT (OUTPATIENT)
Dept: ORTHOPEDICS | Facility: CLINIC | Age: 78
End: 2023-11-27
Payer: MEDICARE

## 2023-11-27 DIAGNOSIS — G95.9 CERVICAL MYELOPATHY: ICD-10-CM

## 2023-11-27 DIAGNOSIS — M50.30 DDD (DEGENERATIVE DISC DISEASE), CERVICAL: Primary | ICD-10-CM

## 2023-11-27 PROCEDURE — 1159F MED LIST DOCD IN RCRD: CPT | Mod: CPTII,95,, | Performed by: REGISTERED NURSE

## 2023-11-27 PROCEDURE — 1159F PR MEDICATION LIST DOCUMENTED IN MEDICAL RECORD: ICD-10-PCS | Mod: CPTII,95,, | Performed by: REGISTERED NURSE

## 2023-11-27 PROCEDURE — 99442 PR PHYSICIAN TELEPHONE EVALUATION 11-20 MIN: CPT | Mod: 95,,, | Performed by: REGISTERED NURSE

## 2023-11-27 PROCEDURE — 1160F RVW MEDS BY RX/DR IN RCRD: CPT | Mod: CPTII,95,, | Performed by: REGISTERED NURSE

## 2023-11-27 PROCEDURE — 1160F PR REVIEW ALL MEDS BY PRESCRIBER/CLIN PHARMACIST DOCUMENTED: ICD-10-PCS | Mod: CPTII,95,, | Performed by: REGISTERED NURSE

## 2023-11-27 PROCEDURE — 99442 PR PHYSICIAN TELEPHONE EVALUATION 11-20 MIN: ICD-10-PCS | Mod: 95,,, | Performed by: REGISTERED NURSE

## 2023-11-27 NOTE — TELEPHONE ENCOUNTER
Spoke w.pt and added the pt to the waiting list to be sen by Dr. Triplett. Pt will keep appt scheduled w/Dr. Espinoza 1/10/2024 and thank me.

## 2023-11-27 NOTE — TELEPHONE ENCOUNTER
Spoke with patient. Informed that Dr Dr Rudd is no longer at [facility]. She would like to follow up with Dr Triplett if possible.

## 2023-11-27 NOTE — TELEPHONE ENCOUNTER
----- Message from Kristina Russ sent at 11/27/2023 12:12 PM CST -----  Regarding: call back  Contact: [phone]  Pt calling in requesting call back pt is est pt with  pt is wanting to know why is she schedule with  please call to discuss Further

## 2023-11-28 ENCOUNTER — TELEPHONE (OUTPATIENT)
Dept: SPORTS MEDICINE | Facility: CLINIC | Age: 78
End: 2023-11-28
Payer: MEDICARE

## 2023-11-28 NOTE — TELEPHONE ENCOUNTER
----- Message from Kayleekael Moses sent at 11/28/2023  1:51 PM CST -----  Contact: [phone]  Caller is requesting an earlier appointment then we can schedule.  Caller is requesting a message be sent to the provider.  If this is for urgent care symptoms, did you offer other providers at this location, providers at other locations, or [facility]? (yes, no, n/a): n/a   If this is for the patients physical, did you offer to schedule next available and put on wait list, or to see NP or PA for their physical?  (yes, no, n/a):  n/a  When is the next available appointment with their provider:  01/16/24  Reason for the appointment:  f/u  Patient preference of timeframe to be scheduled:  ASAP  Would the patient like a call back, or a response through their [facility] portal?:   phone  Comments:

## 2023-11-28 NOTE — TELEPHONE ENCOUNTER
Called and spoke to patient.  Informed her that Dr. Grant does not have any availability until January.  Patient was informed that we will speak with Dr. Grant on 11/29/23 when she returns to clinic and call her back.

## 2023-11-28 NOTE — TELEPHONE ENCOUNTER
Returned call, advised pt we will speak to Dr. Grant when she returns to clinic tomorrow and also advised appt has been added to the wait list to be seen sooner.    Rosita Davis, MS, OTC  Clinical Assistant to Dr. Abbey Grant      ----- Message from Sylwia Juarez sent at 11/28/2023  2:29 PM CST -----  Regarding: appt; sooner than 12/13/23  Contact: PT @ [phone]  Pt is calling to speak to someone in the office; asking for a sooner appt than what they are scheduled for. Pt is already on the wait list; no available appts in Epic that are coming up soon. Pt is asking to speak to someone in the office. Please call to advise. Thanks.    Reason for sooner appt: pt states that she is in a lot of pain; both legs and having a lot of falls    Pt's DX: legs and falling issues    When is the first available appointment? 12/13/2023; at [place]; pt is willing to go to any location if she can get in sooner.     Communication Preference: PT @ [phone]    Additional Information:  Thanks.

## 2023-11-29 ENCOUNTER — TELEPHONE (OUTPATIENT)
Dept: ORTHOPEDICS | Facility: CLINIC | Age: 78
End: 2023-11-29
Payer: MEDICARE

## 2023-11-29 DIAGNOSIS — M50.30 DDD (DEGENERATIVE DISC DISEASE), CERVICAL: ICD-10-CM

## 2023-11-29 DIAGNOSIS — G95.9 CERVICAL MYELOPATHY: Primary | ICD-10-CM

## 2023-11-29 DIAGNOSIS — R53.1 WEAKNESS: ICD-10-CM

## 2023-11-29 NOTE — TELEPHONE ENCOUNTER
Spoke with patient regarding her questions and concerns.  Patient was very concerned as to why they would start with neck surgery over lumbar spine surgery.  I explained to her that this is out of my scope of practice but based on what I review with images, she has canal stenosis at the cervical spine and foraminal stenosis in the lumbar spine, thus addressing the canal stenosis might be of more priority then the foraminal stenosis in the lumbar spine.  Patient encouraged to keep her appointment with Dr. Linda, who is the physician scheduled to perform the surgery.  She should direct all of her questions to him.  All questions were answered to the best of my ability.      ----- Message from Rosita Davis sent at 11/29/2023  9:54 AM CST -----  Regarding: FW: jabier    ----- Message -----  From: Angela Senior MA  Sent: 11/28/2023   2:18 PM CST  To: Rosita Davis  Subject: appt                                             Spoke to patient and she would like to schedule a follow up appointment with Dr. Grant before the end of the year.

## 2023-12-05 ENCOUNTER — PATIENT MESSAGE (OUTPATIENT)
Dept: ENDOCRINOLOGY | Facility: CLINIC | Age: 78
End: 2023-12-05
Payer: MEDICARE

## 2023-12-05 ENCOUNTER — OFFICE VISIT (OUTPATIENT)
Dept: PODIATRY | Facility: CLINIC | Age: 78
End: 2023-12-05
Payer: MEDICARE

## 2023-12-05 ENCOUNTER — LAB VISIT (OUTPATIENT)
Dept: LAB | Facility: HOSPITAL | Age: 78
End: 2023-12-05
Attending: NURSE PRACTITIONER
Payer: MEDICARE

## 2023-12-05 VITALS
BODY MASS INDEX: 43.24 KG/M2 | SYSTOLIC BLOOD PRESSURE: 123 MMHG | DIASTOLIC BLOOD PRESSURE: 58 MMHG | WEIGHT: 235 LBS | HEIGHT: 62 IN | HEART RATE: 94 BPM

## 2023-12-05 DIAGNOSIS — B35.1 DERMATOPHYTOSIS OF NAIL: ICD-10-CM

## 2023-12-05 DIAGNOSIS — N18.31 TYPE 2 DIABETES MELLITUS WITH STAGE 3A CHRONIC KIDNEY DISEASE, WITHOUT LONG-TERM CURRENT USE OF INSULIN: Chronic | ICD-10-CM

## 2023-12-05 DIAGNOSIS — L84 CORN OR CALLUS: ICD-10-CM

## 2023-12-05 DIAGNOSIS — E11.22 TYPE 2 DIABETES MELLITUS WITH STAGE 3A CHRONIC KIDNEY DISEASE, WITHOUT LONG-TERM CURRENT USE OF INSULIN: Chronic | ICD-10-CM

## 2023-12-05 DIAGNOSIS — E11.49 TYPE II DIABETES MELLITUS WITH NEUROLOGICAL MANIFESTATIONS: Primary | ICD-10-CM

## 2023-12-05 LAB
ALBUMIN SERPL BCP-MCNC: 3.4 G/DL (ref 3.5–5.2)
ALP SERPL-CCNC: 73 U/L (ref 55–135)
ALT SERPL W/O P-5'-P-CCNC: 18 U/L (ref 10–44)
ANION GAP SERPL CALC-SCNC: 7 MMOL/L (ref 8–16)
AST SERPL-CCNC: 16 U/L (ref 10–40)
BILIRUB SERPL-MCNC: 0.5 MG/DL (ref 0.1–1)
BUN SERPL-MCNC: 16 MG/DL (ref 8–23)
CALCIUM SERPL-MCNC: 9.7 MG/DL (ref 8.7–10.5)
CHLORIDE SERPL-SCNC: 104 MMOL/L (ref 95–110)
CHOLEST SERPL-MCNC: 103 MG/DL (ref 120–199)
CHOLEST/HDLC SERPL: 3.2 {RATIO} (ref 2–5)
CO2 SERPL-SCNC: 27 MMOL/L (ref 23–29)
CREAT SERPL-MCNC: 0.9 MG/DL (ref 0.5–1.4)
EST. GFR  (NO RACE VARIABLE): >60 ML/MIN/1.73 M^2
ESTIMATED AVG GLUCOSE: 137 MG/DL (ref 68–131)
GLUCOSE SERPL-MCNC: 98 MG/DL (ref 70–110)
HBA1C MFR BLD: 6.4 % (ref 4–5.6)
HDLC SERPL-MCNC: 32 MG/DL (ref 40–75)
HDLC SERPL: 31.1 % (ref 20–50)
LDLC SERPL CALC-MCNC: 52.8 MG/DL (ref 63–159)
NONHDLC SERPL-MCNC: 71 MG/DL
POTASSIUM SERPL-SCNC: 4.5 MMOL/L (ref 3.5–5.1)
PROT SERPL-MCNC: 7.8 G/DL (ref 6–8.4)
SODIUM SERPL-SCNC: 138 MMOL/L (ref 136–145)
TRIGL SERPL-MCNC: 91 MG/DL (ref 30–150)

## 2023-12-05 PROCEDURE — 11721 ROUTINE FOOT CARE: ICD-10-PCS | Mod: 59,Q9,S$GLB, | Performed by: PODIATRIST

## 2023-12-05 PROCEDURE — 36415 COLL VENOUS BLD VENIPUNCTURE: CPT | Mod: PN | Performed by: NURSE PRACTITIONER

## 2023-12-05 PROCEDURE — 11721 DEBRIDE NAIL 6 OR MORE: CPT | Mod: 59,Q9,S$GLB, | Performed by: PODIATRIST

## 2023-12-05 PROCEDURE — 99999 PR PBB SHADOW E&M-EST. PATIENT-LVL V: CPT | Mod: PBBFAC,,, | Performed by: PODIATRIST

## 2023-12-05 PROCEDURE — 99999 PR PBB SHADOW E&M-EST. PATIENT-LVL V: ICD-10-PCS | Mod: PBBFAC,,, | Performed by: PODIATRIST

## 2023-12-05 PROCEDURE — 99499 UNLISTED E&M SERVICE: CPT | Mod: S$GLB,,, | Performed by: PODIATRIST

## 2023-12-05 PROCEDURE — 83036 HEMOGLOBIN GLYCOSYLATED A1C: CPT | Performed by: NURSE PRACTITIONER

## 2023-12-05 PROCEDURE — 11056 PARNG/CUTG B9 HYPRKR LES 2-4: CPT | Mod: Q9,S$GLB,, | Performed by: PODIATRIST

## 2023-12-05 PROCEDURE — 11056 ROUTINE FOOT CARE: ICD-10-PCS | Mod: Q9,S$GLB,, | Performed by: PODIATRIST

## 2023-12-05 PROCEDURE — 80053 COMPREHEN METABOLIC PANEL: CPT | Performed by: NURSE PRACTITIONER

## 2023-12-05 PROCEDURE — 80061 LIPID PANEL: CPT | Performed by: NURSE PRACTITIONER

## 2023-12-05 PROCEDURE — 99499 NO LOS: ICD-10-PCS | Mod: S$GLB,,, | Performed by: PODIATRIST

## 2023-12-05 NOTE — PROGRESS NOTES
Chief Concern: Diabetic Foot Exam (Foot Exam/PCP Liz Roberts MD 11/08/23)      HPI:  Kaylee Romero is a 78 y.o. female presents for foot exam and care.       PCP:  Liz Roberts MD, Diabetic Foot Exam (Foot Exam/PCP Liz Roberts MD 11/08/23)             Patient Active Problem List   Diagnosis    Primary osteoarthritis of right knee    Essential hypertension    Dyslipidemia, goal LDL below 70    Morbid obesity with body mass index (BMI) of 40.0 or higher    KAMRAN on CPAP    History of total knee arthroplasty, left    Physical deconditioning    Osteoporosis without current pathological fracture    History of TIA (transient ischemic attack)    Age-related osteoporosis without current pathological fracture    Vitamin D deficiency, unspecified    Type 2 diabetes mellitus with stage 3 chronic kidney disease, without long-term current use of insulin    History of MI (myocardial infarction)    Coronary 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 pectoris    S/P CABG (coronary artery bypass graft)    Shoulder pain    Physical debility    GRANADO (dyspnea on exertion)    S/P drug eluting coronary stent placement    Abnormal stress test    Aortic atherosclerosis    Chronic heart failure with preserved ejection fraction    Decreased range of motion of right knee    Gait, antalgic    Status post right knee replacement    Purpura           Current Outpatient Medications on File Prior to Visit   Medication Sig Dispense Refill    acetaminophen (TYLENOL) 500 MG tablet Take 500 mg by mouth 2 (two) times daily as needed for Pain.      alendronate (FOSAMAX) 70 MG tablet Take 1 tablet (70 mg total) by mouth every 7 days. 12 tablet 0    amLODIPine (NORVASC) 10 MG tablet TAKE 1 TABLET(10 MG) BY MOUTH EVERY DAY 90 tablet 2    ammonium lactate (LAC-HYDRIN) 12 % lotion Apply topically as needed for Dry Skin. On the feet and toenails. 225 g 6    atorvastatin (LIPITOR) 80 MG tablet TAKE 1  "TABLET(80 MG) BY MOUTH EVERY DAY 90 tablet 0    BD BOO 2ND GEN PEN NEEDLE 32 gauge x 5/32" Ndle To injection daily 100 each 8    blood sugar diagnostic Strp 1 strip by Misc.(Non-Drug; Combo Route) route once daily. 100 strip 3    blood-glucose meter,continuous (DEXCOM G7 ) Misc To use with sensor 1 each 0    blood-glucose sensor (DEXCOM G7 SENSOR) Areli To change every 10 days 3 each 3    carvediloL (COREG) 25 MG tablet TAKE 1 TABLET(25 MG) BY MOUTH TWICE DAILY WITH MEALS 180 tablet 2    clopidogreL (PLAVIX) 75 mg tablet Take 1 tablet (75 mg total) by mouth once daily. 90 tablet 3    COD LIVER OIL ORAL TAKE 2 CAPSULES BY MOUTH EVERY MORNING AND 1 CAPSULE EVERY EVENING      cyclobenzaprine (FLEXERIL) 10 MG tablet Take 1/2 tablet by mouth three times a day as needed for muscle spasms 90 tablet 1    diclofenac sodium (VOLTAREN) 1 % Gel Apply 2 g topically 4 (four) times daily as needed. To painful area on the feet. (Patient taking differently: Apply 2 g topically 4 (four) times daily as needed. To painful area on the feet and knee) 500 g 5    diphenoxylate-atropine 2.5-0.025 mg (LOMOTIL) 2.5-0.025 mg per tablet TAKE 1 TABLET BY MOUTH FOUR TIMES DAILY AS NEEDED 30 tablet 0    dulaglutide (TRULICITY) 4.5 mg/0.5 mL pen injector Inject 4.5 mg into the skin every 7 days. (Patient taking differently: Inject 4.5 mg into the skin every Sunday.) 2 mL 3    ferrous sulfate (FEROSUL) 325 mg (65 mg iron) Tab tablet Take 1 tablet (325 mg total) by mouth once daily. 60 tablet 0    furosemide (LASIX) 20 MG tablet TAKE 1 TABLET BY MOUTH ON SATURDAY, SUNDAY, MONDAY, WEDNESDAY AND FRIDAY 30 tablet 3    glipiZIDE (GLUCOTROL) 5 MG tablet Take 2 tablets (10 mg total) by mouth 2 (two) times daily with meals. 360 tablet 3    glucagon (GVOKE HYPOPEN 2-PACK) 1 mg/0.2 mL AtIn Inject 1 Package into the skin as needed. 2 Syringe 0    insulin glargine,hum.rec.anlog (BASAGLAR KWIKPEN U-100 INSULIN SUBQ) Inject 22 Units into the skin every " "evening.      irbesartan (AVAPRO) 300 MG tablet TAKE 1 TABLET(300 MG) BY MOUTH EVERY EVENING 90 tablet 2    isosorbide mononitrate (IMDUR) 30 MG 24 hr tablet TAKE 2 TABLETS(60 MG) BY MOUTH EVERY  tablet 3    lancets Misc 1 lancet by Misc.(Non-Drug; Combo Route) route once daily. 100 each 3    multivitamin (THERAGRAN) per tablet Take 1 tablet by mouth once daily.      vitamin D (VITAMIN D3) 1000 units Tab Take 1,000 Units by mouth twice a week. Monday AND THURSDAYS ONLY      aspirin (ECOTRIN) 81 MG EC tablet Take 1 tablet (81 mg total) by mouth 2 (two) times a day. 60 tablet 0    gabapentin (NEURONTIN) 100 MG capsule Take 1 capsule (100 mg total) by mouth 2 (two) times daily. 90 capsule 0    nitroGLYCERIN (NITROSTAT) 0.4 MG SL tablet Place 1 tablet (0.4 mg total) under the tongue every 5 (five) minutes as needed for Chest pain. 25 tablet 6     Current Facility-Administered Medications on File Prior to Visit   Medication Dose Route Frequency Provider Last Rate Last Admin    0.9%  NaCl infusion   Intravenous Continuous Doug De Anda MD        hyaluronate sodium, stabilized (MONOVISC) Syrg   Intra-articular 1 time in Clinic/HOD Peterson Sharma MD             Review of patient's allergies indicates:   Allergen Reactions    Keflex [cephalexin] Other (See Comments)     Turns orange    Bactrim [sulfamethoxazole-trimethoprim]      Generic version  Sulfa makes her sick               Objective:        Vitals:    12/05/23 1455   BP: (!) 123/58   Pulse: 94   Weight: 106.6 kg (235 lb)   Height: 5' 2" (1.575 m)         Physical Exam  Constitutional:       Appearance: She is well-developed.   Cardiovascular:      Comments: DP and PT pulses 2/4 loreta.   Edema noted loreta.   Capillary refill time < 3 seconds to digits 1-5, loreta.   Absent hair growth      Musculoskeletal:         General: Deformity present. No tenderness. Normal range of motion.      Comments: HAV noted to loreta 1st MPJ. 5/5 strength to all LE muscle groups. No POP " "noted     Skin:     Capillary Refill: Capillary refill takes 2 to 3 seconds.      Findings: No erythema.      Comments: Toenails x 10 are elongated by 1-3mm, thickened by 1-3mm and mycotic with subungual debris.  Flaky skin on the soles.  No vesicles or redness.      Neurological:      Mental Status: She is alert and oriented to person, place, and time.      Comments: +paresthesias (Abnormal spontaneous sensations in feet)                   Assessment:       Problem List Items Addressed This Visit    None  Visit Diagnoses       Type II diabetes mellitus with neurological manifestations    -  Primary    Dermatophytosis of nail        Corn or callus              Plan:         I counseled the patient on the patient's conditions, their implications and medical management.  Shoe inspection.     Maintain proper foot hygiene.   Continue wearing proper shoe gear, daily foot inspections, never walking without protective shoe gear, never putting sharp instruments to feet.    Routine Foot Care    Date/Time: 12/5/2023 3:00 PM    Performed by: Donna Gillis DPM  Authorized by: Donna Gillis DPM    Time out: Immediately prior to procedure a "time out" was called to verify the correct patient, procedure, equipment, support staff and site/side marked as required.    Hyperkeratotic Skin Lesions?: Yes    Number of trimmed lesions:  2  Location(s):  Left Plantar and Right Plantar    Nail Care Type:  Debride  Location(s): All  (Left 1st Toe, Left 3rd Toe, Left 2nd Toe, Left 4th Toe, Left 5th Toe, Right 1st Toe, Right 2nd Toe, Right 3rd Toe, Right 4th Toe and Right 5th Toe)  Patient tolerance:  Patient tolerated the procedure well with no immediate complications     With patient's permission, the toenails mentioned above were reduced and debrided using a nail nipper, removing offending nail and debris.   Utilizing a #15 scalpel, I trimmed the corns and calluses at the above mentioned location.      The patient will continue to monitor " the areas daily, inspect the feet, wear protective shoe gear when ambulatory, and moisturizer to maintain skin integrity.                     Donna Gillis DPM

## 2023-12-05 NOTE — PATIENT INSTRUCTIONS
How to Check Your Feet    Below are tips to help you look for foot problems. Try to check your feet at the same time each day, such as when you get out of bed in the morning.    Check the top of each foot. The tops of toes, back of the heel, and outer edge of the foot can get a lot of rubbing from poor-fitting shoes.    Check the bottom of each foot. Daily wear and tear often leads to problems at pressure spots.    Check the toes and nails. Fungal infections often occur between toes. Toenail problems can also be a sign of fungal infections or lead to breaks in the skin.    Check your shoes, too. Loose objects inside a shoe can injure the foot. Use your hand to feel inside your shoes for things like gissell, loose stitching, or rough areas that could irritate your skin.        Diabetic Foot Care    Diabetes can lead to a number of different foot complications. Fortunately, most of these complications can be prevented with a little extra foot care. If diabetes is not well controlled, the high blood sugar can cause damage to blood vessels and result in poor circulation to the foot. When the skin does not get enough blood flow, it becomes prone to pressure sores and ulcers, which heal slowly.  High blood sugar can also damage nerves, interfering with the ability to feel pain and pressure. When you cant feel your foot normally, it is easy to injure your skin, bones and joints without knowing it. For these reasons diabetes increases the risk of fungal infections, bunions and ulcers. Deep ulcers can lead to bone infection. Gangrene is the most serious foot complication of diabetes. It usually occurs on the tips of the toes as blacked areas of skin. The black area is dead tissue. In severe cases, gangrene spreads to involve the entire toe, other toes and the entire foot. Foot or toe amputation may be required. Good foot care and blood sugar control can prevent this.    Home Care  Wear comfortable, proper fitting  shoes.  Wash your feet daily with warm water and mild soap.  After drying, apply a moisturizing cream or lotion.  Check your feet daily for skin breaks, blisters, swelling, or redness. Look between your toes also.  Wear cotton socks and change them every day.  Trim toe nails carefully and do not cut your cuticles.  Strive to keep your blood sugar under control with a combination of medicines, diet and activity.  If you smoke and have diabetes, it is very important that you stop. Smoking reduces blood flow to your foot.  Avoid activities that increase your risk of foot injury:  Do not walk barefoot.  Do not use heating pads or hot water bottles on your feet.  Do not put your foot in a hot tub without first checking the temperature with your hand.  10) Schedule yearly foot exams.    Follow Up  with your doctor or as advised by our staff. Report any cut, puncture, scrape, other injury, blister, ingrown toenail or ulcer on your foot.    Get Prompt Medical Attention  if any of the following occur:  -- Open ulcer with pus draining from the wound  -- Increasing foot or leg pain  -- New areas of redness or swelling or tender areas of the foot    © 1352-3562 [email or web address]. [address] [place] [address], [place] [address]. All rights reserved. This information is not intended as a substitute for professional medical care. Always follow your healthcare professional's instructions.

## 2023-12-06 ENCOUNTER — HOSPITAL ENCOUNTER (OUTPATIENT)
Dept: RADIOLOGY | Facility: HOSPITAL | Age: 78
Discharge: HOME | End: 2023-12-06
Attending: REGISTERED NURSE
Payer: MEDICARE

## 2023-12-06 DIAGNOSIS — M50.30 DDD (DEGENERATIVE DISC DISEASE), CERVICAL: ICD-10-CM

## 2023-12-06 PROCEDURE — 72050 X-RAY EXAM NECK SPINE 4/5VWS: CPT | Mod: TC

## 2023-12-06 PROCEDURE — 72050 XR CERVICAL SPINE AP LAT WITH FLEX EXTEN: ICD-10-PCS | Mod: 26,,, | Performed by: RADIOLOGY

## 2023-12-06 PROCEDURE — 72050 X-RAY EXAM NECK SPINE 4/5VWS: CPT | Mod: 26,,, | Performed by: RADIOLOGY

## 2023-12-08 RX ORDER — FUROSEMIDE 20 MG/1
TABLET ORAL
Qty: 30 TABLET | Refills: 3 | Status: SHIPPED | OUTPATIENT
Start: 2023-12-08

## 2023-12-11 DIAGNOSIS — M81.0 OSTEOPOROSIS WITHOUT CURRENT PATHOLOGICAL FRACTURE, UNSPECIFIED OSTEOPOROSIS TYPE: ICD-10-CM

## 2023-12-11 RX ORDER — ALENDRONATE SODIUM 70 MG/1
70 TABLET ORAL
Qty: 12 TABLET | Refills: 0 | Status: SHIPPED | OUTPATIENT
Start: 2023-12-11 | End: 2024-01-03 | Stop reason: SDUPTHER

## 2023-12-11 NOTE — TELEPHONE ENCOUNTER
Care Due:                  Date            Visit Type   Department     Provider  --------------------------------------------------------------------------------                                EP -                              PRIMARY      [facility]   Liz Holly  Last Visit: 12-      CARE ([facility])   CARE           Armando                              EP -                              PRIMARY      [facility]   Liz Barrera  Next Visit: 01-      CARE ([facility])   CARE           Armando                                                            Last  Test          Frequency    Reason                     Performed    Due Date  --------------------------------------------------------------------------------    Vitamin D...  12 months..  alendronate..............  Not Found    Overdue    [employer] Embedded Care Due Messages. Reference number: [id number].   12/11/2023 2:57:42 PM CST

## 2023-12-13 NOTE — PROGRESS NOTES
Patient ID: Kaylee Romero is a 78 y.o. female    Chief Complaint:   No chief complaint on file.    HPI: Kaylee Romero with type 2 diabetes complicated by CAD s/p CABG, TIAs, hypertension, dyslipidemia, CKD Stage 3, obesity, osteoporosis, and KAMRAN presents for follow up of Type 2 diabetes and osteoporosis. Previous patient of mine. Last visit in July 2023    She had right knee arthroscope in March 2023.   She was having numbness in her left leg and falls where her leg has been giving out on her. Saw ortho. She will have disectomy in Jan 2024. She has been walking more.     Was seen in the hospital by KAVON Pritchard NP in 9/2019 because of a Surgical Procedure and Date: CABG 8/12/19.  He started her on levemir daily.     At her Sept 2020 she had a steroid injection and gave extra long acting insulin. Interim visit for lower blood sugars in Sept 2020 (BGs in 40-60's) and we stopped her basaglar as she gave up sugar.     Since her last visit she has been using dexcom and loves it.   Has been having more stress in her family.     CGMS in Jan 2022  Average glucose: 158   Above 180 mg/dL: 30.9% (Above 250 mg/dL: 4.0%)   Within  mg/dL: 67.5%   Below 70 mg/dL: 1.7%  (Below 54 mg/dL: <1%)    Continue   Trulicity 4.5 mg weekly   Glipizide 10 mg twice daily with meals   Decrease   basaglar to 22 units daily      With regards to the diabetes:     Diagnosed with Type 2 diabetes: 2013  Family history of Type 2 diabetes: father  Known complications:  DKA-  RN-; note reviewed; 5/2023  PN- seeing podiatry 12/2022  Nephropathy: now seeing nephrology 12/2021  Gastroparesis: denies   CAD: 3 MI and one stroke     Current regimen:  Trulicity 4.5 mg weekly   Glipizide 10 mg with breakfast and 10 mg with dinner  basaglar 22 units daily whenever her  injects    NOVOLOG PRN steroid injection-none recent    Other medications tried:  Jardiance-insurance issues  Basaglar  Chinyere - did not want to start due to SE profile      Glucose Monitor:  Dexcom G7  Dexcom: average blood sugar 143; 0% low; 87% in range; 13% high; 0% very high  Dexcom: lower end of normal blood sugars overnight and some postprandial hyperglycemia after dinner     Hypoglycemia: overnight   Denies symptoms of hypoglycemia-hypoglycemia unawareness     Knows how to correct with 15 grams of carbs- juice, coke, or a peppermint.      Diet/Exercise: She was seeing a dietician -Jimmy. She is now on protein shakes and food -careful with her food choices.   Usually gives up sweets for LENT.   Breakfast: cottage cheese with fruit or activa or fried egg with pepper and ham or turkey and cheese with bread   Lunch sometimes skipped  Dinner sometimes skipped    She eats nuts in the afternoon   Main meal is vegetable and meat sometimes rice   Snacks: nuts and something sweet nightly.   Drinks: mostly water; OJ with breakfast; sometimes has lemon lime soft drink at times  Exercise - she is doing bike 60 minutes 4-5 times a week    Education - last visit: saw in Nov 2022  Has GVOKE    Denies history of pancreatitis & personal/family history of medullary thyroid cancer.    Lab Results   Component Value Date    HGBA1C 6.4 (H) 12/05/2023    HGBA1C 6.3 (H) 07/18/2023    HGBA1C 5.9 (H) 02/16/2023     Screening or Prevention Patient's value Goal Complete/Controlled?   HgA1C Testing and Control   Lab Results   Component Value Date    HGBA1C 6.4 (H) 12/05/2023      Annually/Less than 8% Yes   Lipid profile : 12/05/2023 Annually Yes   LDL control Lab Results   Component Value Date    LDLCALC 52.8 (L) 12/05/2023    Annually/Less than 100 mg/dl  Yes   Nephropathy screening Lab Results   Component Value Date    LABMICR 5.0 07/18/2023     Lab Results   Component Value Date    PROTEINUA Negative 11/08/2023    Annually No   Blood pressure BP Readings from Last 1 Encounters:   12/15/23 106/76    Less than 140/90 Yes   Dilated retinal exam : 05/26/2023 Annually Yes   Foot exam   : 09/05/2023  Annually Yes     With regards to osteoporosis:   Current meds: Fosamax  Started: 6/2019    Family history: mother     Calcium intake- no calcium supplementation; has in diet   Vit D intake-  1000 twice weekly   Weight bearing exercise-   Walking as tolerated with knee pain    Recent falls- August 2018; September 2018 - no fractures and no falls from a standing position.      She fell in July 2022 stepped on her sons foot. No fractures   Steroid injection in spine around 11/15/23     They have made fall precautions in house   No recent fractures   No significant height loss (>2 inches)     tob use -  None  etoh use- some 1-2 glasses of wine every once in awhile     No current diarrhea or h/o malabsorption     Menopause at age 56     Denies chronic exposure to anticoagulants, proton pump inhibitors or antiseizure medications.   +Steroid injections knees     She is using walker at home  She is using wheelchair in clinic    Denies GERD, indigestion, or gastric bypass surgery.      Denies history of thyroid disease, anemia, kidney stones or kidney disease.      Denies active malignancy, history of malignancy the involved the bone, prior radiation treatment, or unexplained elevations of alk phos on labs      BMD 6/9/2023  FINDINGS:  Lumbar spine (L1-L4):               T-score is 0.7, and Z-score is 2.5.     Compared with previous DXA, BMD at the lumbar spine has remained stable.     Femoral neck:                          T-score is -1.9, and Z-score is -0.6.     Total hip:                                T-score is -0.8, and Z-score is 0.1.     Compared with previous DXA, BMD at the total hip has remained stable.     Distal 1/3 radius:                      Not applicable      Fracture Risk (FRAX)   7.7% risk of a major osteoporotic fracture in the next 10 years.   1.5% risk of hip fracture in the next 10 years.     Impression:   *Osteoporosis on treatment with Fosamax     RECOMMENDATIONS:  *Daily calcium intake 8594-1196  mg, dietary sources preferred; Vitamin D 3740-3782 IU daily.  *Weight bearing exercise and fall precautions.  *If the patient has been treated with an oral bisphosphonate for a period of at least 5 years and has not fractured, a drug holiday can be considered. Continuing treatment is also reasonable if the patient is deemed to be at high risk for fracture.  *Repeat BMD in 2 years.     With regards to the vitamin d deficiency and hypercalcemia,     Transient increase in calcium level in Sept 2021 that normalized when we decrease Vitamin D and stopped chlorthalidone     Currently taking otc 1000 twice weekly    Lab Results   Component Value Date    PTH 53.1 01/06/2022    CALCIUM 9.7 12/05/2023    PHOS 3.3 09/19/2023     Lab Results   Component Value Date    ALBUMIN 3.4 (L) 12/05/2023     Vit D, 25-Hydroxy   Date Value Ref Range Status   11/26/2021 66 30 - 96 ng/mL Final     Comment:     Vitamin D deficiency.........<10 ng/mL                              Vitamin D insufficiency......10-29 ng/mL       Vitamin D sufficiency........> or equal to 30 ng/mL  Vitamin D toxicity............>100 ng/mL       Denies constipation, depression,   Increased polyuria due to lasix  + muscle aches or pains.    No recent fractures     Has seen GI for diarrhea and does take Lomtil with some relief.     Review of Systems   Constitutional:  Negative for fatigue and unexpected weight change.   Eyes:  Negative for visual disturbance.   Endocrine: Negative for polydipsia, polyphagia and polyuria.   Musculoskeletal:  Positive for arthralgias, gait problem, myalgias and neck pain.   Hematological:  Does not bruise/bleed easily.     As above    OBJECTIVE    Physical Exam  Constitutional:       Appearance: Normal appearance.   Neck:      Thyroid: No thyromegaly.   Pulmonary:      Effort: Pulmonary effort is normal.   Neurological:      Mental Status: She is alert.     injection sites are without edema or erythema. No lipo hypertropthy or  "atrophy  DM foot exam deferred; done in 9/2023    Wt Readings from Last 3 Encounters:   12/15/23 1421 106.6 kg (235 lb 0.2 oz)   12/05/23 1455 106.6 kg (235 lb)   11/15/23 1351 106.6 kg (235 lb)     Vitals:    12/15/23 1421   BP: 106/76   Pulse: 94         Current Outpatient Medications:     acetaminophen (TYLENOL) 500 MG tablet, Take 500 mg by mouth 2 (two) times daily as needed for Pain., Disp: , Rfl:     alendronate (FOSAMAX) 70 MG tablet, TAKE 1 TABLET(70 MG) BY MOUTH EVERY 7 DAYS, Disp: 12 tablet, Rfl: 0    amLODIPine (NORVASC) 10 MG tablet, TAKE 1 TABLET(10 MG) BY MOUTH EVERY DAY, Disp: 90 tablet, Rfl: 2    ammonium lactate (LAC-HYDRIN) 12 % lotion, Apply topically as needed for Dry Skin. On the feet and toenails., Disp: 225 g, Rfl: 6    atorvastatin (LIPITOR) 80 MG tablet, TAKE 1 TABLET(80 MG) BY MOUTH EVERY DAY, Disp: 90 tablet, Rfl: 0    BD BOO 2ND GEN PEN NEEDLE 32 gauge x 5/32" Ndle, To injection daily, Disp: 100 each, Rfl: 8    blood sugar diagnostic Strp, 1 strip by Misc.(Non-Drug; Combo Route) route once daily., Disp: 100 strip, Rfl: 3    blood-glucose meter,continuous (DEXCOM G7 ) Misc, To use with sensor, Disp: 1 each, Rfl: 0    blood-glucose sensor (DEXCOM G7 SENSOR) Areli, To change every 10 days, Disp: 3 each, Rfl: 3    carvediloL (COREG) 25 MG tablet, TAKE 1 TABLET(25 MG) BY MOUTH TWICE DAILY WITH MEALS, Disp: 180 tablet, Rfl: 2    clopidogreL (PLAVIX) 75 mg tablet, Take 1 tablet (75 mg total) by mouth once daily., Disp: 90 tablet, Rfl: 3    COD LIVER OIL ORAL, TAKE 2 CAPSULES BY MOUTH EVERY MORNING AND 1 CAPSULE EVERY EVENING, Disp: , Rfl:     cyclobenzaprine (FLEXERIL) 10 MG tablet, Take 1/2 tablet by mouth three times a day as needed for muscle spasms, Disp: 90 tablet, Rfl: 1    diclofenac sodium (VOLTAREN) 1 % Gel, Apply 2 g topically 4 (four) times daily as needed. To painful area on the feet. (Patient taking differently: Apply 2 g topically 4 (four) times daily as needed. To painful " area on the feet and knee), Disp: 500 g, Rfl: 5    diphenoxylate-atropine 2.5-0.025 mg (LOMOTIL) 2.5-0.025 mg per tablet, TAKE 1 TABLET BY MOUTH FOUR TIMES DAILY AS NEEDED, Disp: 30 tablet, Rfl: 0    dulaglutide (TRULICITY) 4.5 mg/0.5 mL pen injector, Inject 4.5 mg into the skin every 7 days. (Patient taking differently: Inject 4.5 mg into the skin every Sunday.), Disp: 2 mL, Rfl: 3    ferrous sulfate (FEROSUL) 325 mg (65 mg iron) Tab tablet, Take 1 tablet (325 mg total) by mouth once daily., Disp: 60 tablet, Rfl: 0    furosemide (LASIX) 20 MG tablet, TAKE 1 TABLET BY MOUTH ON SATURDAY, SUNDAY, MONDAY, WEDNESDAY AND FRIDAY, Disp: 30 tablet, Rfl: 3    glipiZIDE (GLUCOTROL) 5 MG tablet, Take 2 tablets (10 mg total) by mouth 2 (two) times daily with meals., Disp: 360 tablet, Rfl: 3    glucagon (GVOKE HYPOPEN 2-PACK) 1 mg/0.2 mL AtIn, Inject 1 Package into the skin as needed., Disp: 2 Syringe, Rfl: 0    insulin glargine,hum.rec.anlog (BASAGLAR KWIKPEN U-100 INSULIN SUBQ), Inject 22 Units into the skin every evening., Disp: , Rfl:     irbesartan (AVAPRO) 300 MG tablet, TAKE 1 TABLET(300 MG) BY MOUTH EVERY EVENING, Disp: 90 tablet, Rfl: 2    isosorbide mononitrate (IMDUR) 30 MG 24 hr tablet, TAKE 2 TABLETS(60 MG) BY MOUTH EVERY DAY, Disp: 180 tablet, Rfl: 3    lancets Misc, 1 lancet by Misc.(Non-Drug; Combo Route) route once daily., Disp: 100 each, Rfl: 3    multivitamin (THERAGRAN) per tablet, Take 1 tablet by mouth once daily., Disp: , Rfl:     vitamin D (VITAMIN D3) 1000 units Tab, Take 1,000 Units by mouth twice a week. Monday AND THURSDAYS ONLY, Disp: , Rfl:     aspirin (ECOTRIN) 81 MG EC tablet, Take 1 tablet (81 mg total) by mouth 2 (two) times a day., Disp: 60 tablet, Rfl: 0    gabapentin (NEURONTIN) 100 MG capsule, Take 1 capsule (100 mg total) by mouth 2 (two) times daily., Disp: 90 capsule, Rfl: 0    nitroGLYCERIN (NITROSTAT) 0.4 MG SL tablet, Place 1 tablet (0.4 mg total) under the tongue every 5 (five)  minutes as needed for Chest pain., Disp: 25 tablet, Rfl: 6    Current Facility-Administered Medications:     hyaluronate sodium, stabilized (MONOVISC) Syrg, , Intra-articular, 1 time in Clinic/HOD, Peterson Sharma MD    Facility-Administered Medications Ordered in Other Visits:     0.9%  NaCl infusion, , Intravenous, Continuous, Doug De Anda MD    Labs reviewed    Assessment and plan:   1. Type 2 diabetes mellitus with stage 3a chronic kidney disease, without long-term current use of insulin  Comprehensive Metabolic Panel    Hemoglobin A1C      2. Osteoporosis without current pathological fracture, unspecified osteoporosis type        3. Morbid obesity with body mass index (BMI) of 40.0 or higher        4. Essential hypertension        5. Dyslipidemia, goal LDL below 70        6. Vitamin D deficiency, unspecified            Type 2 diabetes mellitus with stage 3 chronic kidney disease, without long-term current use of insulin  -- Labs prior  -- A1c 7-7.5% without hypoglycemia.   Discussed her A1c is at goal  Dexcom: average blood sugar 143; 0% low; 87% in range; 13% high; 0% very high  Dexcom: lower end of normal blood sugars overnight and some postprandial hyperglycemia after dinner     She may be having surgery and is looking for healthy meal options. Given information on premade foods.     She is having hypoglycemia unawareness. I recommend dexcom for high and low blood sugar alerts. She also had hypoglycemia on labs BG 66 and did was not aware.      Patient has diabetes mellitus and manages diabetes with an intensive insulin regimen. The patient requires a therapeutic CGM and is willing to use therapeutic CGM for the necesary frequent adjustments of insulin therapy. Patient has been using SMBG for frequent glucose monitoring (4+ times daily). I have completed an in-person visit during the previous 6 months and will continue to have in-person visits every 6 months to assess adherence to their CGM regimen and  diabetes treatment plan.     Medication Changes   Continue   Trulicity 3.0 mg weekly  Glipizide 10 mg in AM and 10 mg in PM  Decrease   basaglar 20 units daily     -- We will continue novolog for steroid injections. Discussed she will take the novolog before meals with sliding scale below.  With using correction scale:  MDD of:   150-200: +2 units  201-250: +4 units  251-300: +6 units  301-350: +8 units  >350: +10 units    -- Has GVOKE pen.   -- She is fearful of DKA Discussed signs and symptoms of DKA and instructed to buy ketone strips  -- Reviewed goals of therapy are to get the best control we can without hypoglycemia  -- Insulin injection sites and proper rotation instructed.    -- Advised frequent self blood glucose monitoring.  Patient encouraged to document glucose results and bring them to every clinic visit.  -- Hypoglycemia precautions discussed. Instructed on precautions before driving.    -- Call for Bg repeatedly < 90 or > 180.   -- Close adherence to lifestyle changes recommended.   -- Periodic follow ups for eye evaluations, foot care and dental care suggested. UTD    Osteoporosis without current pathological fracture  -- Continue fosamax weekly  -- Reviewed basics of quantity versus quality  -- Reassuring she is not fracturing   -- RDA of calcium (5636-7182 mg /day in divided doses) and vitamin D 2000iu a day  discussed  -- Calcium from food sources preferred  -- Fall precautions emphasized  -- +weight bearing exercise  -- Repeat DEXA scan in June 2025    Morbid obesity with body mass index (BMI) of 40.0 or higher  Encouraged continued exercise and diet.  Given information on diet    Essential hypertension  -- on ARB  -- Controlled  -- Blood pressure goals discussed with patient    Dyslipidemia, goal LDL below 70  -- Controlled   -- On statin per ADA recommendations    Vitamin D deficiency, unspecified  Normal  Continue vit d 1000 twice weekly    Follow up in about 4 months (around 4/15/2024).  Labs  prior to RTC

## 2023-12-14 NOTE — ASSESSMENT & PLAN NOTE
-- Labs prior  -- A1c 7-7.5% without hypoglycemia.   Discussed her A1c is at goal  Dexcom: average blood sugar 143; 0% low; 87% in range; 13% high; 0% very high  Dexcom: lower end of normal blood sugars overnight and some postprandial hyperglycemia after dinner     She may be having surgery and is looking for healthy meal options. Given information on premade foods.     She is having hypoglycemia unawareness. I recommend dexcom for high and low blood sugar alerts. She also had hypoglycemia on labs BG 66 and did was not aware.      Patient has diabetes mellitus and manages diabetes with an intensive insulin regimen. The patient requires a therapeutic CGM and is willing to use therapeutic CGM for the necesary frequent adjustments of insulin therapy. Patient has been using SMBG for frequent glucose monitoring (4+ times daily). I have completed an in-person visit during the previous 6 months and will continue to have in-person visits every 6 months to assess adherence to their CGM regimen and diabetes treatment plan.     Medication Changes   Continue   Trulicity 3.0 mg weekly  Glipizide 10 mg in AM and 10 mg in PM  Decrease   basaglar 20 units daily     -- We will continue novolog for steroid injections. Discussed she will take the novolog before meals with sliding scale below.  With using correction scale:  MDD of:   150-200: +2 units  201-250: +4 units  251-300: +6 units  301-350: +8 units  >350: +10 units    -- Has GVOKE pen.   -- She is fearful of DKA Discussed signs and symptoms of DKA and instructed to buy ketone strips  -- Reviewed goals of therapy are to get the best control we can without hypoglycemia  -- Insulin injection sites and proper rotation instructed.    -- Advised frequent self blood glucose monitoring.  Patient encouraged to document glucose results and bring them to every clinic visit.  -- Hypoglycemia precautions discussed. Instructed on precautions before driving.    -- Call for Bg repeatedly < 90  or > 180.   -- Close adherence to lifestyle changes recommended.   -- Periodic follow ups for eye evaluations, foot care and dental care suggested. UTD

## 2023-12-14 NOTE — ASSESSMENT & PLAN NOTE
-- Continue fosamax weekly  -- Reviewed basics of quantity versus quality  -- Reassuring she is not fracturing   -- RDA of calcium (8325-4405 mg /day in divided doses) and vitamin D 2000iu a day  discussed  -- Calcium from food sources preferred  -- Fall precautions emphasized  -- +weight bearing exercise  -- Repeat DEXA scan in June 2025

## 2023-12-15 ENCOUNTER — OFFICE VISIT (OUTPATIENT)
Dept: ENDOCRINOLOGY | Facility: CLINIC | Age: 78
End: 2023-12-15
Payer: MEDICARE

## 2023-12-15 VITALS
OXYGEN SATURATION: 98 % | SYSTOLIC BLOOD PRESSURE: 106 MMHG | DIASTOLIC BLOOD PRESSURE: 76 MMHG | BODY MASS INDEX: 43.24 KG/M2 | HEIGHT: 62 IN | HEART RATE: 94 BPM | WEIGHT: 235 LBS

## 2023-12-15 DIAGNOSIS — E78.5 DYSLIPIDEMIA, GOAL LDL BELOW 70: ICD-10-CM

## 2023-12-15 DIAGNOSIS — E55.9 VITAMIN D DEFICIENCY, UNSPECIFIED: ICD-10-CM

## 2023-12-15 DIAGNOSIS — D64.9 NORMOCYTIC ANEMIA: ICD-10-CM

## 2023-12-15 DIAGNOSIS — I10 ESSENTIAL HYPERTENSION: Chronic | ICD-10-CM

## 2023-12-15 DIAGNOSIS — E11.22 TYPE 2 DIABETES MELLITUS WITH STAGE 3A CHRONIC KIDNEY DISEASE, WITHOUT LONG-TERM CURRENT USE OF INSULIN: Primary | Chronic | ICD-10-CM

## 2023-12-15 DIAGNOSIS — M81.0 OSTEOPOROSIS WITHOUT CURRENT PATHOLOGICAL FRACTURE, UNSPECIFIED OSTEOPOROSIS TYPE: Chronic | ICD-10-CM

## 2023-12-15 DIAGNOSIS — E66.01 MORBID OBESITY WITH BODY MASS INDEX (BMI) OF 40.0 OR HIGHER: ICD-10-CM

## 2023-12-15 DIAGNOSIS — N18.31 TYPE 2 DIABETES MELLITUS WITH STAGE 3A CHRONIC KIDNEY DISEASE, WITHOUT LONG-TERM CURRENT USE OF INSULIN: Primary | Chronic | ICD-10-CM

## 2023-12-15 PROCEDURE — 3074F PR MOST RECENT SYSTOLIC BLOOD PRESSURE < 130 MM HG: ICD-10-PCS | Mod: CPTII,S$GLB,, | Performed by: NURSE PRACTITIONER

## 2023-12-15 PROCEDURE — 3288F PR FALLS RISK ASSESSMENT DOCUMENTED: ICD-10-PCS | Mod: CPTII,S$GLB,, | Performed by: NURSE PRACTITIONER

## 2023-12-15 PROCEDURE — 1126F PR PAIN SEVERITY QUANTIFIED, NO PAIN PRESENT: ICD-10-PCS | Mod: CPTII,S$GLB,, | Performed by: NURSE PRACTITIONER

## 2023-12-15 PROCEDURE — 1160F RVW MEDS BY RX/DR IN RCRD: CPT | Mod: CPTII,S$GLB,, | Performed by: NURSE PRACTITIONER

## 2023-12-15 PROCEDURE — 95251 PR GLUCOSE MONITOR, 72 HOUR, PHYS INTERP: ICD-10-PCS | Mod: S$GLB,,, | Performed by: NURSE PRACTITIONER

## 2023-12-15 PROCEDURE — 1159F PR MEDICATION LIST DOCUMENTED IN MEDICAL RECORD: ICD-10-PCS | Mod: CPTII,S$GLB,, | Performed by: NURSE PRACTITIONER

## 2023-12-15 PROCEDURE — 99999 PR PBB SHADOW E&M-EST. PATIENT-LVL V: CPT | Mod: PBBFAC,,, | Performed by: NURSE PRACTITIONER

## 2023-12-15 PROCEDURE — 99999 PR PBB SHADOW E&M-EST. PATIENT-LVL V: ICD-10-PCS | Mod: PBBFAC,,, | Performed by: NURSE PRACTITIONER

## 2023-12-15 PROCEDURE — 95251 CONT GLUC MNTR ANALYSIS I&R: CPT | Mod: S$GLB,,, | Performed by: NURSE PRACTITIONER

## 2023-12-15 PROCEDURE — 99214 PR OFFICE/OUTPT VISIT, EST, LEVL IV, 30-39 MIN: ICD-10-PCS | Mod: 25,S$GLB,, | Performed by: NURSE PRACTITIONER

## 2023-12-15 PROCEDURE — 3078F DIAST BP <80 MM HG: CPT | Mod: CPTII,S$GLB,, | Performed by: NURSE PRACTITIONER

## 2023-12-15 PROCEDURE — 99214 OFFICE O/P EST MOD 30 MIN: CPT | Mod: 25,S$GLB,, | Performed by: NURSE PRACTITIONER

## 2023-12-15 PROCEDURE — 1159F MED LIST DOCD IN RCRD: CPT | Mod: CPTII,S$GLB,, | Performed by: NURSE PRACTITIONER

## 2023-12-15 PROCEDURE — 3288F FALL RISK ASSESSMENT DOCD: CPT | Mod: CPTII,S$GLB,, | Performed by: NURSE PRACTITIONER

## 2023-12-15 PROCEDURE — 1126F AMNT PAIN NOTED NONE PRSNT: CPT | Mod: CPTII,S$GLB,, | Performed by: NURSE PRACTITIONER

## 2023-12-15 PROCEDURE — 1100F PR PT FALLS ASSESS DOC 2+ FALLS/FALL W/INJURY/YR: ICD-10-PCS | Mod: CPTII,S$GLB,, | Performed by: NURSE PRACTITIONER

## 2023-12-15 PROCEDURE — 1160F PR REVIEW ALL MEDS BY PRESCRIBER/CLIN PHARMACIST DOCUMENTED: ICD-10-PCS | Mod: CPTII,S$GLB,, | Performed by: NURSE PRACTITIONER

## 2023-12-15 PROCEDURE — 1100F PTFALLS ASSESS-DOCD GE2>/YR: CPT | Mod: CPTII,S$GLB,, | Performed by: NURSE PRACTITIONER

## 2023-12-15 PROCEDURE — 3078F PR MOST RECENT DIASTOLIC BLOOD PRESSURE < 80 MM HG: ICD-10-PCS | Mod: CPTII,S$GLB,, | Performed by: NURSE PRACTITIONER

## 2023-12-15 PROCEDURE — 3074F SYST BP LT 130 MM HG: CPT | Mod: CPTII,S$GLB,, | Performed by: NURSE PRACTITIONER

## 2023-12-15 RX ORDER — FERROUS SULFATE 325(65) MG
325 TABLET ORAL DAILY
Qty: 60 TABLET | Refills: 0 | Status: SHIPPED | OUTPATIENT
Start: 2023-12-15 | End: 2024-01-10 | Stop reason: SDUPTHER

## 2023-12-15 NOTE — PATIENT INSTRUCTIONS
Please make appointment with Dr Jones     Diabetes  Discussed her A1c is at goal  Dexcom: average blood sugar 143; 0% low; 87% in range; 13% high; 0% very high  Dexcom: lower end of normal blood sugars overnight and some postprandial hyperglycemia after dinner     She may be having surgery and is looking for healthy meal options. Given information on premade foods.     She is having hypoglycemia unawareness. I recommend dexcom for high and low blood sugar alerts. She also had hypoglycemia on labs BG 66 and did was not aware.      Patient has diabetes mellitus and manages diabetes with an intensive insulin regimen. The patient requires a therapeutic CGM and is willing to use therapeutic CGM for the necesary frequent adjustments of insulin therapy. Patient has been using SMBG for frequent glucose monitoring (4+ times daily). I have completed an in-person visit during the previous 6 months and will continue to have in-person visits every 6 months to assess adherence to their CGM regimen and diabetes treatment plan.     Medication Changes   Continue   Trulicity 3.0 mg weekly  Glipizide 10 mg in AM and 10 mg in PM  Decrease   basaglar 20 units daily     Skinny Course - Healthy Course Meals      Osteoporosis               Continue Vitamin D 2000 IU twice weekly               RDA of calcium is 7125-4397 mg daily, preferable from food               Avoid alcohol and tobacco and falls              Continue fosamax until 6/2024              Check bone density in June 2025

## 2023-12-20 ENCOUNTER — PATIENT MESSAGE (OUTPATIENT)
Dept: ENDOCRINOLOGY | Facility: CLINIC | Age: 78
End: 2023-12-20
Payer: MEDICARE

## 2023-12-21 ENCOUNTER — PATIENT MESSAGE (OUTPATIENT)
Dept: ENDOCRINOLOGY | Facility: CLINIC | Age: 78
End: 2023-12-21
Payer: MEDICARE

## 2023-12-21 DIAGNOSIS — N18.31 TYPE 2 DIABETES MELLITUS WITH STAGE 3A CHRONIC KIDNEY DISEASE, WITHOUT LONG-TERM CURRENT USE OF INSULIN: Primary | ICD-10-CM

## 2023-12-21 DIAGNOSIS — E11.22 TYPE 2 DIABETES MELLITUS WITH STAGE 3A CHRONIC KIDNEY DISEASE, WITHOUT LONG-TERM CURRENT USE OF INSULIN: Primary | ICD-10-CM

## 2023-12-22 DIAGNOSIS — I11.9 HYPERTENSIVE HEART DISEASE WITHOUT HEART FAILURE: ICD-10-CM

## 2023-12-22 NOTE — TELEPHONE ENCOUNTER
Care Due:                  Date            Visit Type   Department     Provider  --------------------------------------------------------------------------------                                EP -                              PRIMARY      [facility] PRIMARY   Liz Holly  Last Visit: 12-      CARE ([facility])   CARE           Armando  Next Visit: None Scheduled  None         None Found                                                            Last  Test          Frequency    Reason                     Performed    Due Date  --------------------------------------------------------------------------------    Office Visit  12 months..  alendronate..............  12- 12-    [facility] Embedded Care Due Messages. Reference number: [id number].   12/22/2023 2:30:50 PM CST

## 2023-12-24 RX ORDER — AMLODIPINE BESYLATE 10 MG/1
TABLET ORAL
Qty: 90 TABLET | Refills: 0 | Status: SHIPPED | OUTPATIENT
Start: 2023-12-24 | End: 2024-01-03 | Stop reason: SDUPTHER

## 2023-12-24 RX ORDER — IRBESARTAN 300 MG/1
TABLET ORAL
Qty: 90 TABLET | Refills: 0 | OUTPATIENT
Start: 2023-12-24

## 2023-12-24 NOTE — TELEPHONE ENCOUNTER
Provider Staff:  Action required for this patient    Requires appointment      Please see care gap opportunities below in Care Due Message.    Thanks!  [facility]     Appointments      Date Provider   Last Visit   12/30/2022 Liz Roberts MD   Next Visit   1/2/2024 Liz Roberts MD     Refill Decision Note   Kaylee Romero  is requesting a refill authorization.  Brief Assessment and Rationale for Refill:  Quick Discontinue  Approve     Medication Therapy Plan: Irbesartan Receipt confirmed by pharmacy (12/16/2023 12:01 AM CST)      Comments:     Note composed:4:26 AM 12/24/2023

## 2024-01-02 ENCOUNTER — OFFICE VISIT (OUTPATIENT)
Dept: PRIMARY CARE CLINIC | Facility: CLINIC | Age: 79
End: 2024-01-02
Payer: MEDICARE

## 2024-01-02 ENCOUNTER — LAB VISIT (OUTPATIENT)
Dept: LAB | Facility: HOSPITAL | Age: 79
End: 2024-01-02
Attending: INTERNAL MEDICINE
Payer: MEDICARE

## 2024-01-02 VITALS
HEIGHT: 62 IN | WEIGHT: 238.13 LBS | BODY MASS INDEX: 43.82 KG/M2 | OXYGEN SATURATION: 96 % | SYSTOLIC BLOOD PRESSURE: 116 MMHG | TEMPERATURE: 98 F | HEART RATE: 98 BPM | DIASTOLIC BLOOD PRESSURE: 60 MMHG

## 2024-01-02 DIAGNOSIS — I11.0 HYPERTENSIVE HEART DISEASE WITH CHRONIC DIASTOLIC CONGESTIVE HEART FAILURE: Primary | ICD-10-CM

## 2024-01-02 DIAGNOSIS — I50.32 HYPERTENSIVE HEART DISEASE WITH CHRONIC DIASTOLIC CONGESTIVE HEART FAILURE: Primary | ICD-10-CM

## 2024-01-02 DIAGNOSIS — D64.9 NORMOCYTIC ANEMIA: ICD-10-CM

## 2024-01-02 DIAGNOSIS — Z23 NEED FOR COVID-19 VACCINE: ICD-10-CM

## 2024-01-02 DIAGNOSIS — E11.22 TYPE 2 DIABETES MELLITUS WITH STAGE 3A CHRONIC KIDNEY DISEASE, WITH LONG-TERM CURRENT USE OF INSULIN: ICD-10-CM

## 2024-01-02 DIAGNOSIS — I70.0 AORTIC ATHEROSCLEROSIS: ICD-10-CM

## 2024-01-02 DIAGNOSIS — I25.118 CORONARY ARTERY DISEASE OF NATIVE ARTERY OF NATIVE HEART WITH STABLE ANGINA PECTORIS: ICD-10-CM

## 2024-01-02 DIAGNOSIS — M81.0 OSTEOPOROSIS WITHOUT CURRENT PATHOLOGICAL FRACTURE, UNSPECIFIED OSTEOPOROSIS TYPE: ICD-10-CM

## 2024-01-02 DIAGNOSIS — K59.1 FUNCTIONAL DIARRHEA: ICD-10-CM

## 2024-01-02 DIAGNOSIS — Z79.4 TYPE 2 DIABETES MELLITUS WITH STAGE 3A CHRONIC KIDNEY DISEASE, WITH LONG-TERM CURRENT USE OF INSULIN: ICD-10-CM

## 2024-01-02 DIAGNOSIS — I11.9 HYPERTENSIVE HEART DISEASE WITHOUT HEART FAILURE: ICD-10-CM

## 2024-01-02 DIAGNOSIS — Z23 INFLUENZA VACCINE NEEDED: ICD-10-CM

## 2024-01-02 DIAGNOSIS — E66.01 CLASS 3 SEVERE OBESITY DUE TO EXCESS CALORIES WITH SERIOUS COMORBIDITY AND BODY MASS INDEX (BMI) OF 40.0 TO 44.9 IN ADULT: ICD-10-CM

## 2024-01-02 DIAGNOSIS — D69.2 PURPURA: ICD-10-CM

## 2024-01-02 DIAGNOSIS — N18.31 TYPE 2 DIABETES MELLITUS WITH STAGE 3A CHRONIC KIDNEY DISEASE, WITH LONG-TERM CURRENT USE OF INSULIN: ICD-10-CM

## 2024-01-02 DIAGNOSIS — G95.20 CERVICAL CORD COMPRESSION WITH MYELOPATHY: ICD-10-CM

## 2024-01-02 PROCEDURE — 99999 PR PBB SHADOW E&M-EST. PATIENT-LVL IV: CPT | Mod: PBBFAC,,, | Performed by: INTERNAL MEDICINE

## 2024-01-02 PROCEDURE — 99215 OFFICE O/P EST HI 40 MIN: CPT | Mod: S$GLB,,, | Performed by: INTERNAL MEDICINE

## 2024-01-02 PROCEDURE — 80053 COMPREHEN METABOLIC PANEL: CPT | Performed by: INTERNAL MEDICINE

## 2024-01-02 PROCEDURE — 36415 COLL VENOUS BLD VENIPUNCTURE: CPT | Mod: PN | Performed by: INTERNAL MEDICINE

## 2024-01-02 PROCEDURE — 85025 COMPLETE CBC W/AUTO DIFF WBC: CPT | Performed by: INTERNAL MEDICINE

## 2024-01-02 PROCEDURE — 82728 ASSAY OF FERRITIN: CPT | Performed by: INTERNAL MEDICINE

## 2024-01-02 PROCEDURE — 82306 VITAMIN D 25 HYDROXY: CPT | Performed by: INTERNAL MEDICINE

## 2024-01-02 PROCEDURE — 83540 ASSAY OF IRON: CPT | Performed by: INTERNAL MEDICINE

## 2024-01-03 ENCOUNTER — OFFICE VISIT (OUTPATIENT)
Dept: ORTHOPEDICS | Facility: CLINIC | Age: 79
End: 2024-01-03
Payer: MEDICARE

## 2024-01-03 VITALS — BODY MASS INDEX: 43.82 KG/M2 | HEIGHT: 62 IN | WEIGHT: 238.13 LBS

## 2024-01-03 DIAGNOSIS — G95.9 CERVICAL MYELOPATHY: Primary | ICD-10-CM

## 2024-01-03 PROBLEM — M48.02 DEGENERATIVE CERVICAL SPINAL STENOSIS: Status: ACTIVE | Noted: 2024-01-03

## 2024-01-03 LAB
25(OH)D3+25(OH)D2 SERPL-MCNC: 40 NG/ML (ref 30–96)
ALBUMIN SERPL BCP-MCNC: 3.2 G/DL (ref 3.5–5.2)
ALP SERPL-CCNC: 69 U/L (ref 55–135)
ALT SERPL W/O P-5'-P-CCNC: 19 U/L (ref 10–44)
ANION GAP SERPL CALC-SCNC: 11 MMOL/L (ref 8–16)
AST SERPL-CCNC: 19 U/L (ref 10–40)
BASOPHILS # BLD AUTO: 0.02 K/UL (ref 0–0.2)
BASOPHILS NFR BLD: 0.4 % (ref 0–1.9)
BILIRUB SERPL-MCNC: 0.3 MG/DL (ref 0.1–1)
BUN SERPL-MCNC: 18 MG/DL (ref 8–23)
CALCIUM SERPL-MCNC: 9.4 MG/DL (ref 8.7–10.5)
CHLORIDE SERPL-SCNC: 103 MMOL/L (ref 95–110)
CO2 SERPL-SCNC: 23 MMOL/L (ref 23–29)
CREAT SERPL-MCNC: 1.4 MG/DL (ref 0.5–1.4)
DIFFERENTIAL METHOD BLD: ABNORMAL
EOSINOPHIL # BLD AUTO: 0.2 K/UL (ref 0–0.5)
EOSINOPHIL NFR BLD: 3.3 % (ref 0–8)
ERYTHROCYTE [DISTWIDTH] IN BLOOD BY AUTOMATED COUNT: 14.4 % (ref 11.5–14.5)
EST. GFR  (NO RACE VARIABLE): 39 ML/MIN/1.73 M^2
FERRITIN SERPL-MCNC: 393 NG/ML (ref 20–300)
GLUCOSE SERPL-MCNC: 132 MG/DL (ref 70–110)
HCT VFR BLD AUTO: 34.7 % (ref 37–48.5)
HGB BLD-MCNC: 11.1 G/DL (ref 12–16)
IMM GRANULOCYTES # BLD AUTO: 0.03 K/UL (ref 0–0.04)
IMM GRANULOCYTES NFR BLD AUTO: 0.5 % (ref 0–0.5)
IRON SERPL-MCNC: 39 UG/DL (ref 30–160)
LYMPHOCYTES # BLD AUTO: 0.6 K/UL (ref 1–4.8)
LYMPHOCYTES NFR BLD: 9.8 % (ref 18–48)
MCH RBC QN AUTO: 27.7 PG (ref 27–31)
MCHC RBC AUTO-ENTMCNC: 32 G/DL (ref 32–36)
MCV RBC AUTO: 87 FL (ref 82–98)
MONOCYTES # BLD AUTO: 0.4 K/UL (ref 0.3–1)
MONOCYTES NFR BLD: 7.5 % (ref 4–15)
NEUTROPHILS # BLD AUTO: 4.5 K/UL (ref 1.8–7.7)
NEUTROPHILS NFR BLD: 78.5 % (ref 38–73)
NRBC BLD-RTO: 0 /100 WBC
PLATELET # BLD AUTO: 186 K/UL (ref 150–450)
PMV BLD AUTO: 11.2 FL (ref 9.2–12.9)
POTASSIUM SERPL-SCNC: 4.9 MMOL/L (ref 3.5–5.1)
PROT SERPL-MCNC: 7.7 G/DL (ref 6–8.4)
RBC # BLD AUTO: 4.01 M/UL (ref 4–5.4)
SATURATED IRON: 15 % (ref 20–50)
SODIUM SERPL-SCNC: 137 MMOL/L (ref 136–145)
TOTAL IRON BINDING CAPACITY: 258 UG/DL (ref 250–450)
TRANSFERRIN SERPL-MCNC: 174 MG/DL (ref 200–375)
WBC # BLD AUTO: 5.7 K/UL (ref 3.9–12.7)

## 2024-01-03 PROCEDURE — 99214 OFFICE O/P EST MOD 30 MIN: CPT | Mod: S$GLB,,, | Performed by: ORTHOPAEDIC SURGERY

## 2024-01-03 PROCEDURE — 99999 PR PBB SHADOW E&M-EST. PATIENT-LVL IV: CPT | Mod: PBBFAC,,, | Performed by: ORTHOPAEDIC SURGERY

## 2024-01-03 RX ORDER — IRBESARTAN 300 MG/1
300 TABLET ORAL NIGHTLY
Qty: 90 TABLET | Refills: 1 | Status: SHIPPED | OUTPATIENT
Start: 2024-01-03

## 2024-01-03 RX ORDER — ATORVASTATIN CALCIUM 80 MG/1
80 TABLET, FILM COATED ORAL DAILY
Qty: 90 TABLET | Refills: 3 | Status: SHIPPED | OUTPATIENT
Start: 2024-01-03

## 2024-01-03 RX ORDER — AMLODIPINE BESYLATE 10 MG/1
10 TABLET ORAL DAILY
Qty: 90 TABLET | Refills: 1 | Status: SHIPPED | OUTPATIENT
Start: 2024-01-03

## 2024-01-03 RX ORDER — ALENDRONATE SODIUM 70 MG/1
70 TABLET ORAL
Qty: 12 TABLET | Refills: 1 | Status: SHIPPED | OUTPATIENT
Start: 2024-01-03

## 2024-01-03 RX ORDER — GABAPENTIN 100 MG/1
100-200 CAPSULE ORAL NIGHTLY
Qty: 90 CAPSULE | Refills: 0 | Status: SHIPPED | OUTPATIENT
Start: 2024-01-03

## 2024-01-03 RX ORDER — DIPHENOXYLATE HYDROCHLORIDE AND ATROPINE SULFATE 2.5; .025 MG/1; MG/1
1 TABLET ORAL 4 TIMES DAILY PRN
Qty: 30 TABLET | Refills: 1 | Status: SHIPPED | OUTPATIENT
Start: 2024-01-03

## 2024-01-03 RX ORDER — CARVEDILOL 25 MG/1
25 TABLET ORAL 2 TIMES DAILY
Qty: 180 TABLET | Refills: 1 | Status: SHIPPED | OUTPATIENT
Start: 2024-01-03
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
## 2024-01-04 NOTE — PROGRESS NOTES
"  DATE: 1/4/2024  PATIENT: Kaylee Romero      HISTORY:  Kaylee Romero is a 78 y.o. female who returns to me today for surgical discussion after MRI C spine and L spine. She had Left TF ASHELY on 11/15/23 which gave 90% relief in her left leg pain. The patient endorses myelopathic symptoms such as handwriting changes or difficulty with buttons/coins/keys. + gait disturbance / balance problems. Denies perineal paresthesias, bowel/bladder dysfunction.    PMH/PSH/FamHx/SocHx:  Unchanged from prior visit    ROS:  REVIEW OF SYSTEMS:  Constitution: Negative. Negative for chills, fever and night sweats.   HENT: Negative for congestion and headaches.    Eyes: Negative for blurred vision, left vision loss and right vision loss.   Cardiovascular: Negative for chest pain and syncope.   Respiratory: Negative for cough and shortness of breath.    Endocrine: Negative for polydipsia, polyphagia and polyuria.   Hematologic/Lymphatic: Negative for bleeding problem. Does not bruise/bleed easily.   Skin: Negative for dry skin, itching and rash.   Musculoskeletal: Negative for falls and muscle weakness.   Gastrointestinal: Negative for abdominal pain and bowel incontinence.   Allergic/Immunologic: Negative for hives and persistent infections.  Genitourinary: Negative for urinary retention/incontinence and nocturia.   Neurological: negative for disturbances in coordination, no myelopathic symptoms such as handwriting changes or difficulty with buttons, coins, keys or small objects. No loss of balance and seizures.   Psychiatric/Behavioral: Negative for depression. The patient does not have insomnia.   Denies myelopathic symptoms, perineal paresthesias, bowel or bladder incontinence    EXAM:  Ht 5' 2" (1.575 m)   Wt 108 kg (238 lb 1.6 oz)   LMP 01/09/2001 (Approximate)   BMI 43.55 kg/m²   Stable.     IMAGING:    Today I personally re- reviewed AP, Lat and Flex/Ex  upright L-spine that demonstrate mild anterolisthesis of L4 in " relation to L5. Moderate DDD throughout.       Lumbar MRI shows bilateral foraminal narrowing from L4-S1 with a left paracentral disc protrusion at L5/S1. No significant stenosis.      Hip MRI shows suspected tear involving the anterior cartilaginous labrum with markedly frayed margins on a degenerative basis. Chronic degenerative changes of the left hip articulation with minimal surface fraying and cartilage particularly along the the apical surface.    Thoracic MRI shows mild foramina narrowing and stenosis from T1-3.    Cervical MRI shows moderate stenosis from C4-7 with multilevel foraminal narrowing.     Body mass index is 43.55 kg/m².    Hemoglobin A1C   Date Value Ref Range Status   12/05/2023 6.4 (H) 4.0 - 5.6 % Final     Comment:     ADA Screening Guidelines:  5.7-6.4%  Consistent with prediabetes  >or=6.5%  Consistent with diabetes    High levels of fetal hemoglobin interfere with the HbA1C  assay. Heterozygous hemoglobin variants (HbS, HgC, etc)do  not significantly interfere with this assay.   However, presence of multiple variants may affect accuracy.     07/18/2023 6.3 (H) 4.0 - 5.6 % Final     Comment:     ADA Screening Guidelines:  5.7-6.4%  Consistent with prediabetes  >or=6.5%  Consistent with diabetes    High levels of fetal hemoglobin interfere with the HbA1C  assay. Heterozygous hemoglobin variants (HbS, HgC, etc)do  not significantly interfere with this assay.   However, presence of multiple variants may affect accuracy.     02/16/2023 5.9 (H) 4.0 - 5.6 % Final     Comment:     ADA Screening Guidelines:  5.7-6.4%  Consistent with prediabetes  >or=6.5%  Consistent with diabetes    High levels of fetal hemoglobin interfere with the HbA1C  assay. Heterozygous hemoglobin variants (HbS, HgC, etc)do  not significantly interfere with this assay.   However, presence of multiple variants may affect accuracy.           ASSESSMENT/PLAN:    Diagnoses and all orders for this visit:    Cervical  myelopathy      We discussed C4-7 ACDF given cervical stenosis with myelopathy. However, patient feels that her symptoms are somewhat improved and stable, so she would like to defer surgical intervention.    FU with cardiologist as previously scheduled    3 month fu with KIRAN for repeat evaluation

## 2024-01-06 ENCOUNTER — PATIENT MESSAGE (OUTPATIENT)
Dept: ORTHOPEDICS | Facility: CLINIC | Age: 79
End: 2024-01-06
Payer: MEDICARE

## 2024-01-07 ENCOUNTER — PATIENT MESSAGE (OUTPATIENT)
Dept: PRIMARY CARE CLINIC | Facility: CLINIC | Age: 79
End: 2024-01-07
Payer: MEDICARE

## 2024-01-07 DIAGNOSIS — D64.9 NORMOCYTIC ANEMIA: ICD-10-CM

## 2024-01-09 DIAGNOSIS — N18.31 TYPE 2 DIABETES MELLITUS WITH STAGE 3A CHRONIC KIDNEY DISEASE, WITHOUT LONG-TERM CURRENT USE OF INSULIN: Primary | ICD-10-CM

## 2024-01-09 DIAGNOSIS — E11.22 TYPE 2 DIABETES MELLITUS WITH STAGE 3A CHRONIC KIDNEY DISEASE, WITHOUT LONG-TERM CURRENT USE OF INSULIN: Primary | ICD-10-CM

## 2024-01-10 ENCOUNTER — PATIENT MESSAGE (OUTPATIENT)
Dept: GASTROENTEROLOGY | Facility: CLINIC | Age: 79
End: 2024-01-10

## 2024-01-10 ENCOUNTER — OFFICE VISIT (OUTPATIENT)
Dept: GASTROENTEROLOGY | Facility: CLINIC | Age: 79
End: 2024-01-10
Payer: MEDICARE

## 2024-01-10 VITALS
DIASTOLIC BLOOD PRESSURE: 77 MMHG | BODY MASS INDEX: 44.75 KG/M2 | HEART RATE: 94 BPM | HEIGHT: 62 IN | WEIGHT: 243.19 LBS | SYSTOLIC BLOOD PRESSURE: 131 MMHG

## 2024-01-10 DIAGNOSIS — R15.2 FECAL URGENCY: ICD-10-CM

## 2024-01-10 DIAGNOSIS — R11.2 NAUSEA AND VOMITING, UNSPECIFIED VOMITING TYPE: Primary | ICD-10-CM

## 2024-01-10 DIAGNOSIS — K59.1 FUNCTIONAL DIARRHEA: ICD-10-CM

## 2024-01-10 DIAGNOSIS — D50.8 OTHER IRON DEFICIENCY ANEMIA: ICD-10-CM

## 2024-01-10 PROCEDURE — 99999 PR PBB SHADOW E&M-EST. PATIENT-LVL V: CPT | Mod: PBBFAC,,, | Performed by: STUDENT IN AN ORGANIZED HEALTH CARE EDUCATION/TRAINING PROGRAM

## 2024-01-10 PROCEDURE — 99213 OFFICE O/P EST LOW 20 MIN: CPT | Mod: S$GLB,,, | Performed by: STUDENT IN AN ORGANIZED HEALTH CARE EDUCATION/TRAINING PROGRAM

## 2024-01-10 RX ORDER — ONDANSETRON 8 MG/1
8 TABLET, ORALLY DISINTEGRATING ORAL EVERY 8 HOURS PRN
Qty: 1 TABLET | Refills: 0 | Status: SHIPPED | OUTPATIENT
Start: 2024-01-10 | End: 2024-01-11

## 2024-01-10 RX ORDER — FERROUS SULFATE 325(65) MG
325 TABLET ORAL DAILY
Qty: 90 TABLET | Refills: 1 | Status: SHIPPED | OUTPATIENT
Start: 2024-01-10

## 2024-01-10 NOTE — PROGRESS NOTES
[facility] NOTE    Name: Kaylee Romero  : 1945  Date of Service: 01/10/2024   PCP: Liz Roberts MD    Reason for visit:   Chief Complaint   Patient presents with    GI Problem     Nausea, bowel issues       HPI:     The patient is a 79 y/o F who presents to GI clinic to follow up on fecal urgency and to discuss new symptoms of nausea and vomiting. She was last seen in clinic by Dr. Rudd in  and given a prescription for Lomotil for her fecal urgency. She states that this has been working very well for her and has given her much quality of life back. She is using this prn 1-2 times a day on days of the week that she has plans to run errands or socialize. She notes that at the end of  (-) she had an episode of gastroenteritis with severe nausea and vomiting and diarrhea. The diarrhea has more or less improved and the n/v has gotten better as well, but she does note intermittent episodes of nausea without vomiting now. She notes that she does not continue to throw up and that crackers really do help her nausea. She is not sure what precipitates it, but wanted to come if for evaluation as she was worried it would not get better. She denies any unintentional weight loss. She denies any night time awakenings. She has been on diabetic medications for quite some time, but denies any recent or new medication changes. She denies any overt signs of GI bleeding. She has been anemic for some time. She denies any family history of colon cancer.     Review of Systems:   Review of Systems   Constitutional:  Negative for chills and fever.   HENT:  Negative for congestion and sore throat.    Respiratory:  Negative for cough and shortness of breath.    Cardiovascular:  Negative for chest pain and palpitations.   Gastrointestinal:  Positive for nausea and vomiting. Negative for abdominal pain, blood in stool, constipation and diarrhea.   Genitourinary:  Negative for  "dysuria and frequency.   Musculoskeletal:  Negative for back pain and myalgias.   Skin:  Negative for itching and rash.   Neurological:  Negative for dizziness and headaches.       Medications:   Current Outpatient Medications:     acetaminophen (TYLENOL) 500 MG tablet, Take 500 mg by mouth 2 (two) times daily as needed for Pain., Disp: , Rfl:     alendronate (FOSAMAX) 70 MG tablet, Take 1 tablet (70 mg total) by mouth every 7 days., Disp: 12 tablet, Rfl: 1    amLODIPine (NORVASC) 10 MG tablet, Take 1 tablet (10 mg total) by mouth once daily., Disp: 90 tablet, Rfl: 1    ammonium lactate (LAC-HYDRIN) 12 % lotion, Apply topically as needed for Dry Skin. On the feet and toenails., Disp: 225 g, Rfl: 6    atorvastatin (LIPITOR) 80 MG tablet, Take 1 tablet (80 mg total) by mouth once daily., Disp: 90 tablet, Rfl: 3    BD BOO 2ND GEN PEN NEEDLE 32 gauge x 5/32" Ndle, To injection daily, Disp: 100 each, Rfl: 8    blood sugar diagnostic Strp, 1 strip by Misc.(Non-Drug; Combo Route) route once daily., Disp: 100 strip, Rfl: 3    blood-glucose meter,continuous (DEXCOM G7 ) Misc, To use with sensor, Disp: 1 each, Rfl: 0    blood-glucose sensor (DEXCOM G7 SENSOR) Areli, To change every 10 days, Disp: 3 each, Rfl: 3    carvediloL (COREG) 25 MG tablet, Take 1 tablet (25 mg total) by mouth 2 (two) times daily., Disp: 180 tablet, Rfl: 1    clopidogreL (PLAVIX) 75 mg tablet, Take 1 tablet (75 mg total) by mouth once daily., Disp: 90 tablet, Rfl: 3    COD LIVER OIL ORAL, TAKE 2 CAPSULES BY MOUTH EVERY MORNING AND 1 CAPSULE EVERY EVENING, Disp: , Rfl:     cyclobenzaprine (FLEXERIL) 10 MG tablet, Take 1/2 tablet by mouth three times a day as needed for muscle spasms, Disp: 90 tablet, Rfl: 1    diclofenac sodium (VOLTAREN) 1 % Gel, Apply 2 g topically 4 (four) times daily as needed. To painful area on the feet. (Patient taking differently: Apply 2 g topically 4 (four) times daily as needed. To painful area on the feet and knee), " Disp: 500 g, Rfl: 5    diphenoxylate-atropine 2.5-0.025 mg (LOMOTIL) 2.5-0.025 mg per tablet, Take 1 tablet by mouth 4 (four) times daily as needed for Diarrhea., Disp: 30 tablet, Rfl: 1    dulaglutide (TRULICITY) 4.5 mg/0.5 mL pen injector, Inject 4.5 mg into the skin every 7 days. (Patient taking differently: Inject 4.5 mg into the skin every Sunday.), Disp: 2 mL, Rfl: 3    ferrous sulfate (FEROSUL) 325 mg (65 mg iron) Tab tablet, Take 1 tablet (325 mg total) by mouth once daily., Disp: 90 tablet, Rfl: 1    furosemide (LASIX) 20 MG tablet, TAKE 1 TABLET BY MOUTH ON SATURDAY, SUNDAY, MONDAY, WEDNESDAY AND FRIDAY, Disp: 30 tablet, Rfl: 3    gabapentin (NEURONTIN) 100 MG capsule, Take 1-2 capsules (100-200 mg total) by mouth every evening., Disp: 90 capsule, Rfl: 0    glipiZIDE (GLUCOTROL) 5 MG tablet, Take 2 tablets (10 mg total) by mouth 2 (two) times daily with meals., Disp: 360 tablet, Rfl: 3    glucagon (GVOKE HYPOPEN 2-PACK) 1 mg/0.2 mL AtIn, Inject 1 Package into the skin as needed., Disp: 2 Syringe, Rfl: 0    insulin glargine,hum.rec.anlog (BASAGLAR KWIKPEN U-100 INSULIN SUBQ), Inject 22 Units into the skin every evening., Disp: , Rfl:     irbesartan (AVAPRO) 300 MG tablet, Take 1 tablet (300 mg total) by mouth every evening., Disp: 90 tablet, Rfl: 1    isosorbide mononitrate (IMDUR) 30 MG 24 hr tablet, TAKE 2 TABLETS(60 MG) BY MOUTH EVERY DAY, Disp: 180 tablet, Rfl: 3    lancets Misc, 1 lancet by Misc.(Non-Drug; Combo Route) route once daily., Disp: 100 each, Rfl: 3    multivitamin (THERAGRAN) per tablet, Take 1 tablet by mouth once daily., Disp: , Rfl:     vitamin D (VITAMIN D3) 1000 units Tab, Take 1,000 Units by mouth twice a week. Monday AND THURSDAYS ONLY, Disp: , Rfl:     aspirin (ECOTRIN) 81 MG EC tablet, Take 1 tablet (81 mg total) by mouth 2 (two) times a day., Disp: 60 tablet, Rfl: 0    nitroGLYCERIN (NITROSTAT) 0.4 MG SL tablet, Place 1 tablet (0.4 mg total) under the tongue every 5 (five) minutes  "as needed for Chest pain., Disp: 25 tablet, Rfl: 6    ondansetron (ZOFRAN-ODT) 8 MG TbDL, Take 1 tablet (8 mg total) by mouth every 8 (eight) hours as needed., Disp: 1 tablet, Rfl: 0    Current Facility-Administered Medications:     hyaluronate sodium, stabilized (MONOVISC) Syrg, , Intra-articular, 1 time in Clinic/HOD, Peterson Sharma MD    Facility-Administered Medications Ordered in Other Visits:     0.9%  NaCl infusion, , Intravenous, Continuous, Doug De Anda MD     Past medical history, past surgical history, family history, allergies, and social history reviewed and updated in EMR.     Vitals:   Vitals:    01/10/24 1550   BP: 131/77   Pulse: 94   Weight: 110.3 kg (243 lb 2.7 oz)   Height: 5' 2" (1.575 m)     Body mass index is 44.48 kg/m².    Physical Exam:   Physical Exam  HENT:      Head: Normocephalic and atraumatic.      Nose: Nose normal.      Mouth/Throat:      Mouth: Mucous membranes are moist.   Eyes:      General: No scleral icterus.     Extraocular Movements: Extraocular movements intact.   Cardiovascular:      Rate and Rhythm: Normal rate.      Pulses: Normal pulses.   Pulmonary:      Effort: Pulmonary effort is normal. No respiratory distress.   Abdominal:      General: Abdomen is flat. There is no distension.      Tenderness: There is no abdominal tenderness. There is no guarding.   Skin:     General: Skin is warm and dry.      Coloration: Skin is not jaundiced.   Neurological:      Mental Status: She is alert and oriented to person, place, and time.   Psychiatric:         Mood and Affect: Mood normal.         Behavior: Behavior normal.         Assessment:   1. Nausea and vomiting, unspecified vomiting type  ondansetron (ZOFRAN-ODT) 8 MG TbDL      2. Functional diarrhea        3. Other iron deficiency anemia        4. Fecal urgency            Plan:   - The patient does have Fe Deficiency anemia, has had a stable Hgb in the 10-11 range long standing, no overt signs of GI bleeding. We did " discuss the standard of care and role of EGD/colonoscopy in evaluating her anemia, but she feels strongly that procedures are not in line with her goals. She is concerned for risks given her age and co-morbidities and would like more time to consider the need and her desire to pursue further testing- which I think is very reasonable, last FIT test in 2022 was negative. No family history of colon cancer   - Symptoms seem c/w acute viral gastroenteritis, most of which has resolved. She has some ongoing intermittent nausea   - Recommend a BRAT diet, escalate as allowed  - Consider medication induced N/V, though no new medications recently, has been on diabetic meds for several years, but this may be a contributing factor   - PRN Zofran prescribed  - Discussed that should her symptoms progress or fail to resolve, we could arrange for follow up   - Last seen by Dr. Rudd in 2022 for rectal urgency, prescribed lomotil which has worked very well for her prn, has given her much quality of life back, can continue this prn, discussed role of anorectal manometry, but she would like to continue to defer any testing as symptom management has been effective     Disposition: Follow-up as needed.     Discussed with staff attending. Attestation to follow.     Marlee Espinoza DO PGY- 4  Gastroenterology Fellow  [facility]

## 2024-01-10 NOTE — PROGRESS NOTES
"GENERAL GI PATIENT INTAKE:    COVID symptoms in the last 7 days (runny nose, sore throat, congestion, cough, fever): No  PCP: Liz Roberts  If not PCP-  number given to establish [phone]: N/A    ALLERGIES REVIEWED:  Yes    CHIEF COMPLAINT:    Chief Complaint   Patient presents with    GI Problem     Nausea, bowel issues       VITAL SIGNS:  /77   Pulse 94   Ht 5' 2" (1.575 m)   Wt 110.3 kg (243 lb 2.7 oz)   LMP 01/09/2001 (Approximate)   BMI 44.48 kg/m²      Change in medical, surgical, family or social history: No      REVIEWED MEDICATION LIST RECONCILED INCLUDING ABOVE MEDS:  Yes     "

## 2024-01-11 DIAGNOSIS — R11.2 NAUSEA AND VOMITING, UNSPECIFIED VOMITING TYPE: ICD-10-CM

## 2024-01-11 RX ORDER — ONDANSETRON 8 MG/1
8 TABLET, ORALLY DISINTEGRATING ORAL EVERY 8 HOURS PRN
Qty: 90 TABLET | Refills: 0 | Status: SHIPPED | OUTPATIENT
Start: 2024-01-11 | End: 2024-02-10

## 2024-01-12 ENCOUNTER — PATIENT MESSAGE (OUTPATIENT)
Dept: GASTROENTEROLOGY | Facility: CLINIC | Age: 79
End: 2024-01-12
Payer: MEDICARE

## 2024-01-23 ENCOUNTER — LAB VISIT (OUTPATIENT)
Dept: LAB | Facility: HOSPITAL | Age: 79
End: 2024-01-23
Attending: NURSE PRACTITIONER
Payer: MEDICARE

## 2024-01-23 DIAGNOSIS — N18.31 TYPE 2 DIABETES MELLITUS WITH STAGE 3A CHRONIC KIDNEY DISEASE, WITHOUT LONG-TERM CURRENT USE OF INSULIN: ICD-10-CM

## 2024-01-23 DIAGNOSIS — E11.22 TYPE 2 DIABETES MELLITUS WITH STAGE 3A CHRONIC KIDNEY DISEASE, WITHOUT LONG-TERM CURRENT USE OF INSULIN: ICD-10-CM

## 2024-01-24 LAB
ALBUMIN SERPL BCP-MCNC: 3.2 G/DL (ref 3.5–5.2)
ALBUMIN SERPL BCP-MCNC: 3.2 G/DL (ref 3.5–5.2)
ALP SERPL-CCNC: 63 U/L (ref 55–135)
ALT SERPL W/O P-5'-P-CCNC: 14 U/L (ref 10–44)
ANION GAP SERPL CALC-SCNC: 9 MMOL/L (ref 8–16)
ANION GAP SERPL CALC-SCNC: 9 MMOL/L (ref 8–16)
AST SERPL-CCNC: 18 U/L (ref 10–40)
BILIRUB SERPL-MCNC: 0.5 MG/DL (ref 0.1–1)
BUN SERPL-MCNC: 22 MG/DL (ref 8–23)
BUN SERPL-MCNC: 22 MG/DL (ref 8–23)
CALCIUM SERPL-MCNC: 9.6 MG/DL (ref 8.7–10.5)
CALCIUM SERPL-MCNC: 9.6 MG/DL (ref 8.7–10.5)
CHLORIDE SERPL-SCNC: 105 MMOL/L (ref 95–110)
CHLORIDE SERPL-SCNC: 105 MMOL/L (ref 95–110)
CO2 SERPL-SCNC: 24 MMOL/L (ref 23–29)
CO2 SERPL-SCNC: 24 MMOL/L (ref 23–29)
CREAT SERPL-MCNC: 1.2 MG/DL (ref 0.5–1.4)
CREAT SERPL-MCNC: 1.2 MG/DL (ref 0.5–1.4)
EST. GFR  (NO RACE VARIABLE): 46.6 ML/MIN/1.73 M^2
EST. GFR  (NO RACE VARIABLE): 46.6 ML/MIN/1.73 M^2
ESTIMATED AVG GLUCOSE: 140 MG/DL (ref 68–131)
GLUCOSE SERPL-MCNC: 85 MG/DL (ref 70–110)
GLUCOSE SERPL-MCNC: 85 MG/DL (ref 70–110)
HBA1C MFR BLD: 6.5 % (ref 4–5.6)
PHOSPHATE SERPL-MCNC: 3.3 MG/DL (ref 2.7–4.5)
POTASSIUM SERPL-SCNC: 4.2 MMOL/L (ref 3.5–5.1)
POTASSIUM SERPL-SCNC: 4.2 MMOL/L (ref 3.5–5.1)
PROT SERPL-MCNC: 7.7 G/DL (ref 6–8.4)
SODIUM SERPL-SCNC: 138 MMOL/L (ref 136–145)
SODIUM SERPL-SCNC: 138 MMOL/L (ref 136–145)

## 2024-01-24 PROCEDURE — 36415 COLL VENOUS BLD VENIPUNCTURE: CPT | Mod: PN | Performed by: NURSE PRACTITIONER

## 2024-01-24 PROCEDURE — 84100 ASSAY OF PHOSPHORUS: CPT | Performed by: NURSE PRACTITIONER

## 2024-01-24 PROCEDURE — 80053 COMPREHEN METABOLIC PANEL: CPT | Performed by: NURSE PRACTITIONER

## 2024-01-24 PROCEDURE — 83036 HEMOGLOBIN GLYCOSYLATED A1C: CPT | Performed by: NURSE PRACTITIONER

## 2024-01-25 ENCOUNTER — PATIENT MESSAGE (OUTPATIENT)
Dept: ENDOCRINOLOGY | Facility: CLINIC | Age: 79
End: 2024-01-25
Payer: MEDICARE

## 2024-01-27 NOTE — Clinical Note
ostium   left main. Angiography performed post intervention of the left coronary arteries. Angiography performed via hand injection with .  Cyn Fernandez(Resident)

## 2024-02-02 ENCOUNTER — PATIENT MESSAGE (OUTPATIENT)
Dept: ADMINISTRATIVE | Facility: OTHER | Age: 79
End: 2024-02-02
Payer: MEDICARE

## 2024-02-20 DIAGNOSIS — N18.31 TYPE 2 DIABETES MELLITUS WITH STAGE 3A CHRONIC KIDNEY DISEASE, WITHOUT LONG-TERM CURRENT USE OF INSULIN: Primary | ICD-10-CM

## 2024-02-20 DIAGNOSIS — E11.22 TYPE 2 DIABETES MELLITUS WITH STAGE 3A CHRONIC KIDNEY DISEASE, WITHOUT LONG-TERM CURRENT USE OF INSULIN: Primary | ICD-10-CM

## 2024-02-26 ENCOUNTER — PATIENT MESSAGE (OUTPATIENT)
Dept: NUTRITION | Facility: CLINIC | Age: 79
End: 2024-02-26
Payer: MEDICARE

## 2024-02-26 ENCOUNTER — TELEPHONE (OUTPATIENT)
Dept: ORTHOPEDICS | Facility: CLINIC | Age: 79
End: 2024-02-26
Payer: MEDICARE

## 2024-02-26 ENCOUNTER — OFFICE VISIT (OUTPATIENT)
Dept: ORTHOPEDICS | Facility: CLINIC | Age: 79
End: 2024-02-26
Payer: MEDICARE

## 2024-02-26 ENCOUNTER — OFFICE VISIT (OUTPATIENT)
Dept: NEPHROLOGY | Facility: CLINIC | Age: 79
End: 2024-02-26
Payer: MEDICARE

## 2024-02-26 ENCOUNTER — HOSPITAL ENCOUNTER (OUTPATIENT)
Dept: RADIOLOGY | Facility: HOSPITAL | Age: 79
Discharge: HOME | End: 2024-02-26
Attending: ORTHOPAEDIC SURGERY
Payer: MEDICARE

## 2024-02-26 VITALS
DIASTOLIC BLOOD PRESSURE: 67 MMHG | BODY MASS INDEX: 42.34 KG/M2 | WEIGHT: 231.5 LBS | HEART RATE: 76 BPM | OXYGEN SATURATION: 95 % | SYSTOLIC BLOOD PRESSURE: 108 MMHG

## 2024-02-26 DIAGNOSIS — E11.69 HYPERLIPIDEMIA ASSOCIATED WITH TYPE 2 DIABETES MELLITUS: ICD-10-CM

## 2024-02-26 DIAGNOSIS — N18.31 STAGE 3A CHRONIC KIDNEY DISEASE: Primary | ICD-10-CM

## 2024-02-26 DIAGNOSIS — S86.012A STRAIN OF LEFT ACHILLES TENDON, INITIAL ENCOUNTER: Primary | ICD-10-CM

## 2024-02-26 DIAGNOSIS — E66.01 SEVERE OBESITY (BMI >= 40): ICD-10-CM

## 2024-02-26 DIAGNOSIS — I10 HYPERTENSION, UNSPECIFIED TYPE: ICD-10-CM

## 2024-02-26 DIAGNOSIS — E78.5 HYPERLIPIDEMIA ASSOCIATED WITH TYPE 2 DIABETES MELLITUS: ICD-10-CM

## 2024-02-26 DIAGNOSIS — N18.31 ANEMIA IN STAGE 3A CHRONIC KIDNEY DISEASE: ICD-10-CM

## 2024-02-26 DIAGNOSIS — I50.9 CONGESTIVE HEART FAILURE, UNSPECIFIED HF CHRONICITY, UNSPECIFIED HEART FAILURE TYPE: ICD-10-CM

## 2024-02-26 DIAGNOSIS — M25.572 LEFT ANKLE PAIN, UNSPECIFIED CHRONICITY: ICD-10-CM

## 2024-02-26 DIAGNOSIS — E11.22 TYPE 2 DIABETES MELLITUS WITH STAGE 3A CHRONIC KIDNEY DISEASE, UNSPECIFIED WHETHER LONG TERM INSULIN USE: ICD-10-CM

## 2024-02-26 DIAGNOSIS — N18.31 TYPE 2 DIABETES MELLITUS WITH STAGE 3A CHRONIC KIDNEY DISEASE, UNSPECIFIED WHETHER LONG TERM INSULIN USE: ICD-10-CM

## 2024-02-26 DIAGNOSIS — D63.1 ANEMIA IN STAGE 3A CHRONIC KIDNEY DISEASE: ICD-10-CM

## 2024-02-26 PROCEDURE — 73610 X-RAY EXAM OF ANKLE: CPT | Mod: TC,LT

## 2024-02-26 PROCEDURE — 99999 PR PBB SHADOW E&M-EST. PATIENT-LVL IV: CPT | Mod: PBBFAC,,, | Performed by: ORTHOPAEDIC SURGERY

## 2024-02-26 PROCEDURE — 99999 PR PBB SHADOW E&M-EST. PATIENT-LVL V: CPT | Mod: PBBFAC,,, | Performed by: INTERNAL MEDICINE

## 2024-02-26 PROCEDURE — 99214 OFFICE O/P EST MOD 30 MIN: CPT | Mod: S$GLB,,, | Performed by: INTERNAL MEDICINE

## 2024-02-26 PROCEDURE — 99214 OFFICE O/P EST MOD 30 MIN: CPT | Mod: S$GLB,,, | Performed by: ORTHOPAEDIC SURGERY

## 2024-02-26 PROCEDURE — 73610 X-RAY EXAM OF ANKLE: CPT | Mod: 26,LT,, | Performed by: RADIOLOGY

## 2024-02-26 NOTE — PROGRESS NOTES
Subjective:   Chief complaint:   Chief Complaint   Patient presents with    Left Ankle - Pain     Referring provider: Charan Marina     HPI:   Kaylee Romero is a 79 y.o. female who presents today for evaluation of left achilles pain.  Rates pain as 6/10.  Pain has been ongoing for one week.  Inciting event: none known. Patient reports that she twisted her ankle while putting on shoes which may have caused the pain Treatments tried: rest, ankle wrap, ice.    Pain is made worse with foot and ankle movement. Pain is improved with an ankle wrap. Pain is located at the posterior aspect of the heel over the achilles tendon insertion. Pain does not radiate. Reports previous history of left ankle sprains with surgical intervention    Does the patient use tobacco products? No  If so, what and how often? N/A      Objective:   ROS:  Musculoskeletal: per HPI  Neurological: Negative for tingling and numbness  Heme: Positive for blood thinners (DAPT); Negative for history of blood clot  Endocrine: Positive for diabetes (last A1c 6.5)    There is ankle swelling.  There is no ecchymosis.  Medial longitudinal arch is pes planus.      Focused exam of the left lower extremity demonstrates normal ankle range of motion.  Hindfoot is flexible.  Ankle dorsiflexion is within normal limits.  TTP to achilles insertion. Pain with ankle inversion and eversion.    Fires TA/GSC/PTT/peroneals.  SILT SP/DP/PT and able to localize. WWP.      Imaging:  I independently reviewed and interpreted the imaging and my findings are as follows:       No acute fracture or dislocation      Assessment:     1. Strain of left Achilles tendon, initial encounter         Patient is seen for a new acute injury  without complications expected from treatment.    Data:  imaging results independently interpreted    Treatment plan: Counseling regarding non-operative treatment and possibility of surgery in future if persistent morbidity from symptoms        Plan:        WBAT in boot  MM pain control  Follow up 4-6 weeks for repeat examination    I have personally taken the history and examined this patient and agree with the residents note as stated above.      Orders Placed This Encounter   Procedures    X-Ray Ankle Complete Left     Standing Status:   Future     Number of Occurrences:   1     Standing Expiration Date:   2025     Order Specific Question:   Reason for Exam:     Answer:   rm 10, standing     Comments:   rm 10, standing     Order Specific Question:   May the Radiologist modify the order per protocol to meet the clinical needs of the patient?     Answer:   Yes     Order Specific Question:   Release to patient     Answer:   Immediate       Past Medical History:   Diagnosis Date    Age-related osteoporosis without current pathological fracture 2019    Anemia     Cataract     CKD (chronic kidney disease) stage 3, GFR 30-59 ml/min     Coronary artery disease     Dry mouth     History of TIA (transient ischemic attack) 2018    Hyperlipidemia 2014    Hypertension     Hypertensive heart disease with diastolic heart failure 2012    Morbid obesity with body mass index (BMI) of 40.0 or higher 2016    KAMRAN (obstructive sleep apnea)     KAMRAN on CPAP 2016    Osteoporosis without current pathological fracture 2018    Physical deconditioning 2018    Status post total left knee replacement 2017    Type 2 diabetes mellitus with stage 3 chronic kidney disease, without long-term current use of insulin 2019       Past Surgical History:   Procedure Laterality Date    ankle surgery (l) Left     APPENDECTOMY       SECTION      CORONARY ARTERY BYPASS GRAFT  09/2019    x 2    CORONARY ARTERY BYPASS GRAFT (CABG) N/A 2019    Procedure: CORONARY ARTERY BYPASS GRAFT (CABG)X3;  Surgeon: Peterson Ventura MD;  Location: [facility] OR [address] [place] [address];  Service: Cardiovascular;  Laterality: N/A;    CORONARY BYPASS GRAFT  ANGIOGRAPHY  10/18/2021    Procedure: Bypass graft study;  Surgeon: Jamar Haddad MD;  Location: [facility] CATH LAB;  Service: Cardiology;;    DILATION AND CURETTAGE OF UTERUS      ENDOSCOPIC HARVEST OF VEIN Left 08/12/2019    Procedure: SURGICAL PROCUREMENT, VEIN, ENDOSCOPIC;  Surgeon: Peterson Ventura MD;  [address];  Service: Cardiovascular;  Laterality: Left;  Vein [place] Start: 0843; End: 1054   Vein Prep Start: 1055; End: 1145    JOINT REPLACEMENT Left     knee replacement    KNEE ARTHROSCOPY W/ DEBRIDEMENT      KNEE SURGERY Left 05/01/2017    TKR    LEFT HEART CATHETERIZATION Left 07/09/2019    Procedure: Left heart cath;  Surgeon: Ronak Gibbons MD;  Location: [facility] CATH LAB;  Service: Cardiology;  Laterality: Left;    LEFT HEART CATHETERIZATION Left 10/18/2021    Procedure: Left heart cath;  Surgeon: Jamar Haddad MD;  Location: [facility] CATH LAB;  Service: Cardiology;  Laterality: Left;    TOTAL KNEE ARTHROPLASTY Right 3/9/2023    Procedure: ARTHROPLASTY, KNEE, TOTAL:RIGHT;  Surgeon: Peterson Sharma MD;  [address];  Service: Orthopedics;  Laterality: Right;    TRANSFORAMINAL EPIDURAL INJECTION OF STEROID Left 11/15/2023    Procedure: LUMBAR TRANSFORAMINAL LEFT L2/3 AND L3/4 DIRECT REFERRAL *PLAVIX CLEARANCE IN CHART*;  Surgeon: Frank Ken MD;  Location: [facility] PAIN MGT;  Service: Pain Management;  Laterality: Left;       Family History   Problem Relation Age of Onset    Heart attack Mother     Heart disease Father     Stroke Father     Heart failure Father     Diabetes Father     Arrhythmia Father     No Known Problems Brother     Stroke Paternal Grandfather     No Known Problems Son     Breast cancer Neg Hx     Colon cancer Neg Hx     Ovarian cancer Neg Hx     Amblyopia Neg Hx     Blindness Neg Hx     Cancer Neg Hx     Cataracts Neg Hx     Glaucoma Neg Hx     Hypertension Neg Hx     Macular degeneration Neg Hx     Retinal detachment Neg Hx     Strabismus  Neg Hx     Thyroid disease Neg Hx     Esophageal cancer Neg Hx        Social History     Socioeconomic History    Marital status:     Number of children: 1   Tobacco Use    Smoking status: Never    Smokeless tobacco: Never   Substance and Sexual Activity    Alcohol use: Yes     Alcohol/week: 1.0 standard drink of alcohol     Types: 1 Shots of liquor per week     Comment: rare    Drug use: No    Sexual activity: Yes     Partners: Male     Birth control/protection: Post-menopausal   Social History Narrative     with a son living locally.  at [employer], Retired 5/18.     Social Determinants of Health     Financial Resource Strain: Low Risk  (1/9/2024)    Overall Financial Resource Strain (CARDIA)     Difficulty of Paying Living Expenses: Not hard at all   Food Insecurity: No Food Insecurity (1/9/2024)    Hunger Vital Sign     Worried About Running Out of Food in the Last Year: Never true     Ran Out of Food in the Last Year: Never true   Transportation Needs: No Transportation Needs (1/9/2024)    PRAPARE - Transportation     Lack of Transportation (Medical): No     Lack of Transportation (Non-Medical): No   Physical Activity: Sufficiently Active (1/9/2024)    Exercise Vital Sign     Days of Exercise per Week: 4 days     Minutes of Exercise per Session: 60 min   Stress: No Stress Concern Present (1/9/2024)    [employer] - Occupational Stress Questionnaire     Feeling of Stress : Not at all   Social Connections: Moderately Integrated (1/9/2024)    Social Connection and Isolation Panel [NHANES]     Frequency of Communication with Friends and Family: More than three times a week     Frequency of Social Gatherings with Friends and Family: Once a week     Attends [employer] Services: More than 4 times per year     Active Member of Clubs or Organizations: No     Attends Club or Organization Meetings: Never     Marital Status:    Housing Stability: Low  Risk  (1/9/2024)    Housing Stability Vital Sign     Unable to Pay for Housing in the Last Year: No     Number of Places Lived in the Last Year: 1     Unstable Housing in the Last Year: No

## 2024-02-26 NOTE — TELEPHONE ENCOUNTER
Spoke to patient, scheduled for today as requested     ----- Message from Nelia Melo sent at 2/26/2024  8:45 AM CST -----    Patient Returning Call        Who Called:pt  Does the patient know what this is regarding?:problems with left ankle was a previous pt  asking if provider will see pt again provider did surgery on left ankle   Would the patient rather a call back or a response via [employer]? call  Best Call Back Number:[phone]  Additional Information: call back

## 2024-02-26 NOTE — PROGRESS NOTES
HPI  This 79 y.o. y/o female with PMH of HTN, HLD, type 2 DM, HFpEF, morbid obesity, KAMRAN, CAD s/p CABG in 09/2019, CKD came in for CKD.    Renal history  Creatinine   Date Value Ref Range Status   01/24/2024 1.2 0.5 - 1.4 mg/dL Final   01/24/2024 1.2 0.5 - 1.4 mg/dL Final   01/03/2024 1.4 0.5 - 1.4 mg/dL Final   12/05/2023 0.9 0.5 - 1.4 mg/dL Final   09/19/2023 1.0 0.5 - 1.4 mg/dL Final   08/23/2023 1.2 0.5 - 1.4 mg/dL Final   07/18/2023 1.3 0.5 - 1.4 mg/dL Final   02/16/2023 1.0 0.5 - 1.4 mg/dL Final   12/30/2022 1.1 0.5 - 1.4 mg/dL Final   06/21/2022 1.0 0.5 - 1.4 mg/dL Final   Baseline Cr 1.0-1.4 since 2019  Cr up to 2.4 in 2021, reason unknown, then down to 1.0-1.4  DOREEN Cr up to 2.0 christin CABG in 08/2019  Prot/Creat Ratio, Urine   Date Value Ref Range Status   09/19/2023 Unable to calculate 0.00 - 0.20 Final   06/10/2022 Unable to calculate 0.00 - 0.20 Final     Has been drinking 30 oz of fluid a day  Not taking NSAIDs  Started high protein diet 8 oz a day per     HTN  BP this visit 108/67 mmHg  BP at home 110s  Current medication: amlodipine 10 mg, carvedilol 25 mg BID, irbesartan 300 mg daily, imdur 30 mg daily, lasix 20 mg every other day  Has been compliant with low salt diet    Type 2 DM  Lab Results   Component Value Date    HGBA1C 6.5 (H) 01/24/2024   Not on DM meds after started high protein diet    Past Medical History:   Diagnosis Date    Age-related osteoporosis without current pathological fracture 01/11/2019    Anemia     Cataract     CKD (chronic kidney disease) stage 3, GFR 30-59 ml/min     Coronary artery disease     Dry mouth     History of TIA (transient ischemic attack) 12/11/2018    Hyperlipidemia 12/02/2014    Hypertension     Hypertensive heart disease with diastolic heart failure 11/28/2012    Morbid obesity with body mass index (BMI) of 40.0 or higher 05/09/2016    KAMRAN (obstructive sleep apnea)     KAMRAN on CPAP 06/28/2016    Osteoporosis without current pathological fracture  2018    Physical deconditioning 2018    Status post total left knee replacement 2017    Type 2 diabetes mellitus with stage 3 chronic kidney disease, without long-term current use of insulin 2019       Past Surgical History:   Procedure Laterality Date    ankle surgery (l) Left     APPENDECTOMY       SECTION      CORONARY ARTERY BYPASS GRAFT  09/2019    x 2    CORONARY ARTERY BYPASS GRAFT (CABG) N/A 2019    Procedure: CORONARY ARTERY BYPASS GRAFT (CABG)X3;  Surgeon: Peterson Ventura MD;  [facility];  Service: Cardiovascular;  Laterality: N/A;    CORONARY BYPASS GRAFT ANGIOGRAPHY  10/18/2021    Procedure: Bypass graft study;  Surgeon: Jamar Haddad MD;  Location: [facility] CATH LAB;  Service: Cardiology;;    DILATION AND CURETTAGE OF UTERUS      ENDOSCOPIC HARVEST OF VEIN Left 2019    Procedure: SURGICAL PROCUREMENT, VEIN, ENDOSCOPIC;  Surgeon: Peterson Ventura MD;  [facility];  Service: Cardiovascular;  Laterality: Left;  Vein [place] Start: 08; End: 1054   Vein Prep Start: 1055; End: 1145    JOINT REPLACEMENT Left     knee replacement    KNEE ARTHROSCOPY W/ DEBRIDEMENT      KNEE SURGERY Left 2017    TKR    LEFT HEART CATHETERIZATION Left 2019    Procedure: Left heart cath;  Surgeon: Ronak Gibbons MD;  Location: [facility] CATH LAB;  Service: Cardiology;  Laterality: Left;    LEFT HEART CATHETERIZATION Left 10/18/2021    Procedure: Left heart cath;  Surgeon: Jamar Haddad MD;  Location: [facility] CATH LAB;  Service: Cardiology;  Laterality: Left;    TOTAL KNEE ARTHROPLASTY Right 3/9/2023    Procedure: ARTHROPLASTY, KNEE, TOTAL:RIGHT;  Surgeon: Peterson Sharma MD;  Location: [address];  Service: Orthopedics;  Laterality: Right;    TRANSFORAMINAL EPIDURAL INJECTION OF STEROID Left 11/15/2023    Procedure: LUMBAR TRANSFORAMINAL LEFT L2/3 AND L3/4 DIRECT REFERRAL *PLAVIX CLEARANCE IN CHART*;  Surgeon: Suellen  Frank TENA MD;  Location: [facility] PAIN MGT;  Service: Pain Management;  Laterality: Left;       Review of patient's allergies indicates:   Allergen Reactions    Bactrim [sulfamethoxazole-trimethoprim] Other (See Comments)     Generic version  Sulfa makes her sick    Keflex [cephalexin] Other (See Comments)     Turns orange         Social History     Socioeconomic History    Marital status:     Number of children: 1   Tobacco Use    Smoking status: Never    Smokeless tobacco: Never   Substance and Sexual Activity    Alcohol use: Yes     Alcohol/week: 1.0 standard drink of alcohol     Types: 1 Shots of liquor per week     Comment: rare    Drug use: No    Sexual activity: Yes     Partners: Male     Birth control/protection: Post-menopausal   Social History Narrative     with a son living locally.  at [employer], Retired 5/18.     Social Determinants of Health     Financial Resource Strain: Low Risk  (1/9/2024)    Overall Financial Resource Strain (CARDIA)     Difficulty of Paying Living Expenses: Not hard at all   Food Insecurity: No Food Insecurity (1/9/2024)    Hunger Vital Sign     Worried About Running Out of Food in the Last Year: Never true     Ran Out of Food in the Last Year: Never true   Transportation Needs: No Transportation Needs (1/9/2024)    PRAPARE - Transportation     Lack of Transportation (Medical): No     Lack of Transportation (Non-Medical): No   Physical Activity: Sufficiently Active (1/9/2024)    Exercise Vital Sign     Days of Exercise per Week: 4 days     Minutes of Exercise per Session: 60 min   Stress: No Stress Concern Present (1/9/2024)    [employer] - Occupational Stress Questionnaire     Feeling of Stress : Not at all   Social Connections: Moderately Integrated (1/9/2024)    Social Connection and Isolation Panel [NHANES]     Frequency of Communication with Friends and Family: More than three times a week     Frequency of  Social Gatherings with Friends and Family: Once a week     Attends Yarsani Services: More than 4 times per year     Active Member of Clubs or Organizations: No     Attends Club or Organization Meetings: Never     Marital Status:    Housing Stability: Low Risk  (1/9/2024)    Housing Stability Vital Sign     Unable to Pay for Housing in the Last Year: No     Number of Places Lived in the Last Year: 1     Unstable Housing in the Last Year: No       Family History   Problem Relation Age of Onset    Heart attack Mother     Heart disease Father     Stroke Father     Heart failure Father     Diabetes Father     Arrhythmia Father     No Known Problems Brother     Stroke Paternal Grandfather     No Known Problems Son     Breast cancer Neg Hx     Colon cancer Neg Hx     Ovarian cancer Neg Hx     Amblyopia Neg Hx     Blindness Neg Hx     Cancer Neg Hx     Cataracts Neg Hx     Glaucoma Neg Hx     Hypertension Neg Hx     Macular degeneration Neg Hx     Retinal detachment Neg Hx     Strabismus Neg Hx     Thyroid disease Neg Hx     Esophageal cancer Neg Hx        ROS negative except listed above    PHYSICAL EXAMINATION:  Blood pressure 108/67, pulse 76, weight 105 kg (231 lb 7.7 oz), last menstrual period 01/09/2001, SpO2 95 %.  Constitutional - No acute distress  HEENT - Grossly normal  Neck - supple.   Cardiovascular - JVP 8 cm  Respiratory - Bilateral coarse breathing sound  Musculoskeletal - Peripheral edema trace  Dermatologic/Skin - Skin warm and dry.  No rashes.    Neurologic - No acute neurological deficit  Psychiatric - AAOx3    Assessment and Plan  1. CKD Stage 3A:     Urine Protein Creatinine Ratios:    Prot/Creat Ratio, Urine   Date Value Ref Range Status   09/19/2023 Unable to calculate 0.00 - 0.20 Final   06/10/2022 Unable to calculate 0.00 - 0.20 Final   02/21/2022 Unable to calculate 0.00 - 0.20 Final         Acid-Base:   Lab Results   Component Value Date     01/24/2024     01/24/2024    K 4.2  01/24/2024    K 4.2 01/24/2024    CO2 24 01/24/2024    CO2 24 01/24/2024     -Counseled to avoid NSAIDs  -Counseled to drink 40 oz a day  -Will check BMD, iron profile, RP US  -Given her CKD stage, will recommend protein intake no more than 3 oz a day. Unfortunately, our dietician left earlier this month. The pt states she has a dietician at [facility], so recommend the patient to talk to her.    2. HTN: BP goal 130/80 mmHg  -Counseled for low salt diet  -Continue amlodipine 10 mg, carvedilol 25 mg BID, irbesartan 300 mg daily, imdur 30 mg daily, lasix 20 mg every other day    3. Bone mineral condition:   Lab Results   Component Value Date    PTH 53.1 01/06/2022    PTH 45.2 11/26/2021    CALCIUM 9.6 01/24/2024    CALCIUM 9.6 01/24/2024    PHOS 3.3 01/24/2024    PHOS 3.3 09/19/2023     Vit D, 25-Hydroxy   Date Value Ref Range Status   01/03/2024 40 30 - 96 ng/mL Final     Comment:     Vitamin D deficiency.........<10 ng/mL                              Vitamin D insufficiency......10-29 ng/mL       Vitamin D sufficiency........> or equal to 30 ng/mL  Vitamin D toxicity............>100 ng/mL        Will continue to monitor    4. Anemia:   Lab Results   Component Value Date    HGB 11.1 (L) 01/03/2024    FERRITIN 393 (H) 01/03/2024    UIBC 227 08/23/2005    IRON 39 01/03/2024    TRANSFERRIN 174 (L) 01/03/2024    TIBC 258 01/03/2024    FESATURATED 15 (L) 01/03/2024    Will check iron profile    5. Type 2 DM:  Last HbA1C   Lab Results   Component Value Date    HGBA1C 6.5 (H) 01/24/2024     Counseled the patient regarding the importance of sugar control to slow CKD progression     6. Lipid management:   Lab Results   Component Value Date    LDLCALC 52.8 (L) 12/05/2023       7. HFpEF  Counseled the patient to watch and keep the weight stable as unstable fluid status can affect the kidney function.    8. Severe obesity  Counseled for weight loss since the weight is associated with CKD progression    ESRD planing when eGFR < 15      Follow up in 4 months

## 2024-02-28 ENCOUNTER — PATIENT MESSAGE (OUTPATIENT)
Dept: NEPHROLOGY | Facility: CLINIC | Age: 79
End: 2024-02-28
Payer: MEDICARE

## 2024-03-01 ENCOUNTER — PATIENT MESSAGE (OUTPATIENT)
Dept: ORTHOPEDICS | Facility: CLINIC | Age: 79
End: 2024-03-01
Payer: MEDICARE

## 2024-03-01 DIAGNOSIS — N18.31 STAGE 3A CHRONIC KIDNEY DISEASE: Primary | ICD-10-CM

## 2024-03-01 NOTE — PROGRESS NOTES
BMP, UPCR ordered to assess if there is any change after she had high protein diet (per the pt's request).    
English

## 2024-03-04 DIAGNOSIS — Z96.651 STATUS POST RIGHT KNEE REPLACEMENT: Primary | ICD-10-CM

## 2024-03-05 ENCOUNTER — OFFICE VISIT (OUTPATIENT)
Dept: PODIATRY | Facility: CLINIC | Age: 79
End: 2024-03-05
Payer: MEDICARE

## 2024-03-05 ENCOUNTER — LAB VISIT (OUTPATIENT)
Dept: LAB | Facility: HOSPITAL | Age: 79
End: 2024-03-05
Attending: INTERNAL MEDICINE
Payer: MEDICARE

## 2024-03-05 VITALS
BODY MASS INDEX: 42.51 KG/M2 | SYSTOLIC BLOOD PRESSURE: 114 MMHG | WEIGHT: 231 LBS | HEIGHT: 62 IN | DIASTOLIC BLOOD PRESSURE: 63 MMHG | HEART RATE: 85 BPM

## 2024-03-05 DIAGNOSIS — B35.1 DERMATOPHYTOSIS OF NAIL: Primary | ICD-10-CM

## 2024-03-05 DIAGNOSIS — E11.49 TYPE II DIABETES MELLITUS WITH NEUROLOGICAL MANIFESTATIONS: ICD-10-CM

## 2024-03-05 DIAGNOSIS — N18.31 STAGE 3A CHRONIC KIDNEY DISEASE: ICD-10-CM

## 2024-03-05 LAB
BILIRUB UR QL STRIP: NEGATIVE
CLARITY UR REFRACT.AUTO: CLEAR
COLOR UR AUTO: YELLOW
CREAT UR-MCNC: 126 MG/DL (ref 15–325)
GLUCOSE UR QL STRIP: NEGATIVE
HGB UR QL STRIP: NEGATIVE
KETONES UR QL STRIP: NEGATIVE
LEUKOCYTE ESTERASE UR QL STRIP: NEGATIVE
NITRITE UR QL STRIP: NEGATIVE
PH UR STRIP: 6 [PH] (ref 5–8)
PROT UR QL STRIP: ABNORMAL
PROT UR-MCNC: 12 MG/DL (ref 0–15)
PROT/CREAT UR: 0.1 MG/G{CREAT} (ref 0–0.2)
SP GR UR STRIP: 1.02 (ref 1–1.03)
URN SPEC COLLECT METH UR: ABNORMAL

## 2024-03-05 PROCEDURE — 11721 DEBRIDE NAIL 6 OR MORE: CPT | Mod: Q9,S$GLB,, | Performed by: PODIATRIST

## 2024-03-05 PROCEDURE — 84156 ASSAY OF PROTEIN URINE: CPT | Performed by: INTERNAL MEDICINE

## 2024-03-05 PROCEDURE — 99999 PR PBB SHADOW E&M-EST. PATIENT-LVL V: CPT | Mod: PBBFAC,,, | Performed by: PODIATRIST

## 2024-03-05 PROCEDURE — 99499 UNLISTED E&M SERVICE: CPT | Mod: S$GLB,,, | Performed by: PODIATRIST

## 2024-03-05 PROCEDURE — 81003 URINALYSIS AUTO W/O SCOPE: CPT | Performed by: INTERNAL MEDICINE

## 2024-03-05 NOTE — PATIENT INSTRUCTIONS
How to Check Your Feet    Below are tips to help you look for foot problems. Try to check your feet at the same time each day, such as when you get out of bed in the morning.    Check the top of each foot. The tops of toes, back of the heel, and outer edge of the foot can get a lot of rubbing from poor-fitting shoes.    Check the bottom of each foot. Daily wear and tear often leads to problems at pressure spots.    Check the toes and nails. Fungal infections often occur between toes. Toenail problems can also be a sign of fungal infections or lead to breaks in the skin.    Check your shoes, too. Loose objects inside a shoe can injure the foot. Use your hand to feel inside your shoes for things like gissell, loose stitching, or rough areas that could irritate your skin.        Diabetic Foot Care    Diabetes can lead to a number of different foot complications. Fortunately, most of these complications can be prevented with a little extra foot care. If diabetes is not well controlled, the high blood sugar can cause damage to blood vessels and result in poor circulation to the foot. When the skin does not get enough blood flow, it becomes prone to pressure sores and ulcers, which heal slowly.  High blood sugar can also damage nerves, interfering with the ability to feel pain and pressure. When you cant feel your foot normally, it is easy to injure your skin, bones and joints without knowing it. For these reasons diabetes increases the risk of fungal infections, bunions and ulcers. Deep ulcers can lead to bone infection. Gangrene is the most serious foot complication of diabetes. It usually occurs on the tips of the toes as blacked areas of skin. The black area is dead tissue. In severe cases, gangrene spreads to involve the entire toe, other toes and the entire foot. Foot or toe amputation may be required. Good foot care and blood sugar control can prevent this.    Home Care  Wear comfortable, proper fitting  shoes.  Wash your feet daily with warm water and mild soap.  After drying, apply a moisturizing cream or lotion.  Check your feet daily for skin breaks, blisters, swelling, or redness. Look between your toes also.  Wear cotton socks and change them every day.  Trim toe nails carefully and do not cut your cuticles.  Strive to keep your blood sugar under control with a combination of medicines, diet and activity.  If you smoke and have diabetes, it is very important that you stop. Smoking reduces blood flow to your foot.  Avoid activities that increase your risk of foot injury:  Do not walk barefoot.  Do not use heating pads or hot water bottles on your feet.  Do not put your foot in a hot tub without first checking the temperature with your hand.  10) Schedule yearly foot exams.    Follow Up  with your doctor or as advised by our staff. Report any cut, puncture, scrape, other injury, blister, ingrown toenail or ulcer on your foot.    Get Prompt Medical Attention  if any of the following occur:  -- Open ulcer with pus draining from the wound  -- Increasing foot or leg pain  -- New areas of redness or swelling or tender areas of the foot    © 9311-3868 [employer]. [address] [place] [address], [place] [address]. All rights reserved. This information is not intended as a substitute for professional medical care. Always follow your healthcare professional's instructions.

## 2024-03-05 NOTE — PROGRESS NOTES
Chief Concern: Diabetic Foot Exam (Foot Exam/PCP Liz Roberts MD  01/02/24)      HPI:  Kaylee Romero is a 79 y.o. female presents for foot exam and care.       PCP:  Liz Roberts MD, Diabetic Foot Exam (Foot Exam/PCP Liz Roberts MD  01/02/24)             Patient Active Problem List   Diagnosis    Essential hypertension    Dyslipidemia, goal LDL below 70    Class 3 severe obesity due to excess calories with serious comorbidity and body mass index (BMI) of 40.0 to 44.9 in adult    KAMRAN on CPAP    History of total knee arthroplasty, left    Osteoporosis without current pathological fracture    History of TIA (transient ischemic attack)    Age-related osteoporosis without current pathological fracture    Vitamin D deficiency, unspecified    Type 2 diabetes mellitus with stage 3 chronic kidney disease, without long-term current use of insulin    History of MI (myocardial infarction)    Coronary 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 pectoris    S/P CABG (coronary artery bypass graft)    Shoulder pain    GRANADO (dyspnea on exertion)    S/P drug eluting coronary stent placement    Abnormal stress test    Aortic atherosclerosis    Chronic heart failure with preserved ejection fraction    Decreased range of motion of right knee    Gait, antalgic    Purpura    Degenerative cervical spinal stenosis           Current Outpatient Medications on File Prior to Visit   Medication Sig Dispense Refill    acetaminophen (TYLENOL) 500 MG tablet Take 500 mg by mouth 2 (two) times daily as needed for Pain.      alendronate (FOSAMAX) 70 MG tablet Take 1 tablet (70 mg total) by mouth every 7 days. 12 tablet 1    amLODIPine (NORVASC) 10 MG tablet Take 1 tablet (10 mg total) by mouth once daily. 90 tablet 1    ammonium lactate (LAC-HYDRIN) 12 % lotion Apply topically as needed for Dry Skin. On the feet and toenails. 225 g 6    atorvastatin (LIPITOR) 80 MG tablet Take 1 tablet (80 mg  "total) by mouth once daily. 90 tablet 3    BD BOO 2ND GEN PEN NEEDLE 32 gauge x 5/32" Ndle To injection daily 100 each 8    blood sugar diagnostic Strp 1 strip by Misc.(Non-Drug; Combo Route) route once daily. 100 strip 3    blood-glucose meter,continuous (DEXCOM G7 ) Misc To use with sensor 1 each 0    blood-glucose sensor (DEXCOM G7 SENSOR) Areli To change every 10 days 3 each 3    carvediloL (COREG) 25 MG tablet Take 1 tablet (25 mg total) by mouth 2 (two) times daily. 180 tablet 1    clopidogreL (PLAVIX) 75 mg tablet Take 1 tablet (75 mg total) by mouth once daily. 90 tablet 3    COD LIVER OIL ORAL TAKE 2 CAPSULES BY MOUTH EVERY MORNING AND 1 CAPSULE EVERY EVENING      cyclobenzaprine (FLEXERIL) 10 MG tablet Take 1/2 tablet by mouth three times a day as needed for muscle spasms 90 tablet 1    diclofenac sodium (VOLTAREN) 1 % Gel Apply 2 g topically 4 (four) times daily as needed. To painful area on the feet. (Patient taking differently: Apply 2 g topically 4 (four) times daily as needed. To painful area on the feet and knee) 500 g 5    diphenoxylate-atropine 2.5-0.025 mg (LOMOTIL) 2.5-0.025 mg per tablet Take 1 tablet by mouth 4 (four) times daily as needed for Diarrhea. 30 tablet 1    dulaglutide (TRULICITY) 4.5 mg/0.5 mL pen injector Inject 4.5 mg into the skin every 7 days. (Patient taking differently: Inject 4.5 mg into the skin every Sunday.) 2 mL 3    ferrous sulfate (FEROSUL) 325 mg (65 mg iron) Tab tablet Take 1 tablet (325 mg total) by mouth once daily. 90 tablet 1    furosemide (LASIX) 20 MG tablet TAKE 1 TABLET BY MOUTH ON SATURDAY, SUNDAY, MONDAY, WEDNESDAY AND FRIDAY 30 tablet 3    gabapentin (NEURONTIN) 100 MG capsule Take 1-2 capsules (100-200 mg total) by mouth every evening. 90 capsule 0    glipiZIDE (GLUCOTROL) 5 MG tablet Take 2 tablets (10 mg total) by mouth 2 (two) times daily with meals. 360 tablet 3    glucagon (GVOKE HYPOPEN 2-PACK) 1 mg/0.2 mL AtIn Inject 1 Package into the skin " "as needed. 2 Syringe 0    insulin glargine,hum.rec.anlog (BASAGLAR KWIKPEN U-100 INSULIN SUBQ) Inject 22 Units into the skin every evening.      irbesartan (AVAPRO) 300 MG tablet Take 1 tablet (300 mg total) by mouth every evening. 90 tablet 1    isosorbide mononitrate (IMDUR) 30 MG 24 hr tablet TAKE 2 TABLETS(60 MG) BY MOUTH EVERY  tablet 3    lancets Misc 1 lancet by Misc.(Non-Drug; Combo Route) route once daily. 100 each 3    multivitamin (THERAGRAN) per tablet Take 1 tablet by mouth once daily.      vitamin D (VITAMIN D3) 1000 units Tab Take 1,000 Units by mouth twice a week. Monday AND THURSDAYS ONLY      aspirin (ECOTRIN) 81 MG EC tablet Take 1 tablet (81 mg total) by mouth 2 (two) times a day. 60 tablet 0    nitroGLYCERIN (NITROSTAT) 0.4 MG SL tablet Place 1 tablet (0.4 mg total) under the tongue every 5 (five) minutes as needed for Chest pain. 25 tablet 6     Current Facility-Administered Medications on File Prior to Visit   Medication Dose Route Frequency Provider Last Rate Last Admin    0.9%  NaCl infusion   Intravenous Continuous Doug De Anda MD        hyaluronate sodium, stabilized (MONOVISC) Syrg   Intra-articular 1 time in Clinic/HOD Peterson Sharma MD             Review of patient's allergies indicates:   Allergen Reactions    Keflex [cephalexin] Other (See Comments)     Turns orange    Bactrim [sulfamethoxazole-trimethoprim]      Generic version  Sulfa makes her sick               Objective:        Vitals:    03/05/24 1415   BP: 114/63   Pulse: 85   Weight: 104.8 kg (231 lb)   Height: 5' 2" (1.575 m)         Physical Exam  Constitutional:       Appearance: She is well-developed.   Cardiovascular:      Comments: DP and PT pulses 2/4 loreta.   Edema noted loreta.   Capillary refill time < 3 seconds to digits 1-5, loreta.   Absent hair growth      Musculoskeletal:         General: Deformity present. No tenderness. Normal range of motion.      Comments: HAV noted to loreta 1st MPJ. 5/5 strength to all LE " "muscle groups. No POP noted     Skin:     Capillary Refill: Capillary refill takes 2 to 3 seconds.      Findings: No erythema.      Comments: Toenails x 10 are elongated by 1-3mm, thickened by 1-3mm and mycotic with subungual debris.  Flaky skin on the soles.  No vesicles or redness.      Neurological:      Mental Status: She is alert and oriented to person, place, and time.      Comments: +paresthesias (Abnormal spontaneous sensations in feet)                   Assessment:       Problem List Items Addressed This Visit    None  Visit Diagnoses       Dermatophytosis of nail    -  Primary    Type II diabetes mellitus with neurological manifestations              Plan:         I counseled the patient on the patient's conditions, their implications and medical management.  Shoe inspection.     Maintain proper foot hygiene.   Continue wearing proper shoe gear, daily foot inspections, never walking without protective shoe gear, never putting sharp instruments to feet.    Routine Foot Care    Date/Time: 3/5/2024 2:00 PM    Performed by: Donna Gillis DPM  Authorized by: Donna Gillis DPM    Time out: Immediately prior to procedure a "time out" was called to verify the correct patient, procedure, equipment, support staff and site/side marked as required.      Nail Care Type:  Debride  Location(s): All  (Left 1st Toe, Left 3rd Toe, Left 2nd Toe, Left 4th Toe, Left 5th Toe, Right 1st Toe, Right 2nd Toe, Right 3rd Toe, Right 4th Toe and Right 5th Toe)  Patient tolerance:  Patient tolerated the procedure well with no immediate complications     With patient's permission, the toenails mentioned above were reduced and debrided using a nail nipper, removing offending nail and debris. The patient will continue to monitor the areas daily, inspect the feet, wear protective shoe gear when ambulatory, and moisturizer to maintain skin integrity.                      Donna Gillis DPM  "

## 2024-03-06 ENCOUNTER — PATIENT MESSAGE (OUTPATIENT)
Dept: NEPHROLOGY | Facility: CLINIC | Age: 79
End: 2024-03-06
Payer: MEDICARE

## 2024-03-07 ENCOUNTER — OFFICE VISIT (OUTPATIENT)
Dept: CARDIOLOGY | Facility: CLINIC | Age: 79
End: 2024-03-07
Payer: MEDICARE

## 2024-03-07 VITALS
DIASTOLIC BLOOD PRESSURE: 56 MMHG | HEART RATE: 72 BPM | WEIGHT: 231.5 LBS | OXYGEN SATURATION: 96 % | SYSTOLIC BLOOD PRESSURE: 113 MMHG | HEIGHT: 62 IN | BODY MASS INDEX: 42.6 KG/M2

## 2024-03-07 DIAGNOSIS — I50.32 CHRONIC HEART FAILURE WITH PRESERVED EJECTION FRACTION: ICD-10-CM

## 2024-03-07 DIAGNOSIS — E78.5 DYSLIPIDEMIA, GOAL LDL BELOW 70: ICD-10-CM

## 2024-03-07 DIAGNOSIS — I25.118 CORONARY ARTERY DISEASE OF NATIVE ARTERY OF NATIVE HEART WITH STABLE ANGINA PECTORIS: ICD-10-CM

## 2024-03-07 DIAGNOSIS — Z95.1 S/P CABG (CORONARY ARTERY BYPASS GRAFT): Primary | ICD-10-CM

## 2024-03-07 DIAGNOSIS — I10 ESSENTIAL HYPERTENSION: Chronic | ICD-10-CM

## 2024-03-07 PROBLEM — R06.09 DOE (DYSPNEA ON EXERTION): Status: RESOLVED | Noted: 2020-06-15 | Resolved: 2024-03-07

## 2024-03-07 PROBLEM — I70.0 AORTIC ATHEROSCLEROSIS: Status: RESOLVED | Noted: 2021-12-23 | Resolved: 2024-03-07

## 2024-03-07 PROBLEM — Z95.5 S/P DRUG ELUTING CORONARY STENT PLACEMENT: Status: RESOLVED | Noted: 2021-04-12 | Resolved: 2024-03-07

## 2024-03-07 PROBLEM — R94.39 ABNORMAL STRESS TEST: Status: RESOLVED | Noted: 2021-10-18 | Resolved: 2024-03-07

## 2024-03-07 PROBLEM — I25.2 HISTORY OF MI (MYOCARDIAL INFARCTION): Status: RESOLVED | Noted: 2019-07-08 | Resolved: 2024-03-07

## 2024-03-07 PROCEDURE — 99999 PR PBB SHADOW E&M-EST. PATIENT-LVL V: CPT | Mod: PBBFAC,GC,, | Performed by: STUDENT IN AN ORGANIZED HEALTH CARE EDUCATION/TRAINING PROGRAM

## 2024-03-07 PROCEDURE — 99214 OFFICE O/P EST MOD 30 MIN: CPT | Mod: GC,S$GLB,, | Performed by: STUDENT IN AN ORGANIZED HEALTH CARE EDUCATION/TRAINING PROGRAM

## 2024-03-07 NOTE — PROGRESS NOTES
PCP - Liz Roberts MD  Referring Physician:     Subjective:   Patient ID:  Kaylee Romero is a 79 y.o. female with past medical history of:   CAD s/p 2v CABG (LIMA-LAD and SVG-OM in 8/2019) with subsequent 90% stenosis of distal LIMA graft s/p PCI of high grade LCx and RCA lesions  HFpEF  HTN  HLD  DM2  CKD III  KAMRAN on CPAP  TIA  obesity     Prior patient of Dr. Mauricio. Patient has no new complaints from previous visit. Patient specifically denies chest pain, shortness of breath, palpitations, PND. No limitations on exercise. Taking all medications as prescribed. She is exercising intermittently. Today, she wished to discuss the new diet that she is on.       History:     Social History     Tobacco Use    Smoking status: Never    Smokeless tobacco: Never   Substance Use Topics    Alcohol use: Yes     Alcohol/week: 1.0 standard drink of alcohol     Types: 1 Shots of liquor per week     Comment: rare     Family History   Problem Relation Age of Onset    Heart attack Mother     Heart disease Father     Stroke Father     Heart failure Father     Diabetes Father     Arrhythmia Father     No Known Problems Brother     Stroke Paternal Grandfather     No Known Problems Son     Breast cancer Neg Hx     Colon cancer Neg Hx     Ovarian cancer Neg Hx     Amblyopia Neg Hx     Blindness Neg Hx     Cancer Neg Hx     Cataracts Neg Hx     Glaucoma Neg Hx     Hypertension Neg Hx     Macular degeneration Neg Hx     Retinal detachment Neg Hx     Strabismus Neg Hx     Thyroid disease Neg Hx     Esophageal cancer Neg Hx        Meds:     Review of patient's allergies indicates:   Allergen Reactions    Bactrim [sulfamethoxazole-trimethoprim] Other (See Comments)     Generic version  Sulfa makes her sick    Keflex [cephalexin] Other (See Comments)     Turns orange       Current Outpatient Medications:     acetaminophen (TYLENOL) 500 MG tablet, Take 500 mg by mouth 2 (two) times daily as needed for Pain., Disp: , Rfl:      "alendronate (FOSAMAX) 70 MG tablet, Take 1 tablet (70 mg total) by mouth every 7 days., Disp: 12 tablet, Rfl: 1    amLODIPine (NORVASC) 10 MG tablet, Take 1 tablet (10 mg total) by mouth once daily., Disp: 90 tablet, Rfl: 1    ammonium lactate (LAC-HYDRIN) 12 % lotion, Apply topically as needed for Dry Skin. On the feet and toenails., Disp: 225 g, Rfl: 6    atorvastatin (LIPITOR) 80 MG tablet, Take 1 tablet (80 mg total) by mouth once daily., Disp: 90 tablet, Rfl: 3    BD BOO 2ND GEN PEN NEEDLE 32 gauge x 5/32" Ndle, To injection daily, Disp: 100 each, Rfl: 8    blood sugar diagnostic Strp, 1 strip by Misc.(Non-Drug; Combo Route) route once daily., Disp: 100 strip, Rfl: 3    blood-glucose meter,continuous (DEXCOM G7 ) Misc, To use with sensor, Disp: 1 each, Rfl: 0    blood-glucose sensor (DEXCOM G7 SENSOR) Areli, To change every 10 days, Disp: 3 each, Rfl: 3    carvediloL (COREG) 25 MG tablet, Take 1 tablet (25 mg total) by mouth 2 (two) times daily., Disp: 180 tablet, Rfl: 1    clopidogreL (PLAVIX) 75 mg tablet, Take 1 tablet (75 mg total) by mouth once daily., Disp: 90 tablet, Rfl: 3    COD LIVER OIL ORAL, TAKE 2 CAPSULES BY MOUTH EVERY MORNING AND 1 CAPSULE EVERY EVENING, Disp: , Rfl:     cyclobenzaprine (FLEXERIL) 10 MG tablet, Take 1/2 tablet by mouth three times a day as needed for muscle spasms, Disp: 90 tablet, Rfl: 1    diclofenac sodium (VOLTAREN) 1 % Gel, Apply 2 g topically 4 (four) times daily as needed. To painful area on the feet. (Patient taking differently: Apply 2 g topically 4 (four) times daily as needed. To painful area on the feet and knee), Disp: 500 g, Rfl: 5    diphenoxylate-atropine 2.5-0.025 mg (LOMOTIL) 2.5-0.025 mg per tablet, Take 1 tablet by mouth 4 (four) times daily as needed for Diarrhea., Disp: 30 tablet, Rfl: 1    ferrous sulfate (FEROSUL) 325 mg (65 mg iron) Tab tablet, Take 1 tablet (325 mg total) by mouth once daily., Disp: 90 tablet, Rfl: 1    furosemide (LASIX) 20 MG " tablet, TAKE 1 TABLET BY MOUTH ON SATURDAY, SUNDAY, MONDAY, WEDNESDAY AND FRIDAY, Disp: 30 tablet, Rfl: 3    gabapentin (NEURONTIN) 100 MG capsule, Take 1-2 capsules (100-200 mg total) by mouth every evening., Disp: 90 capsule, Rfl: 0    irbesartan (AVAPRO) 300 MG tablet, Take 1 tablet (300 mg total) by mouth every evening., Disp: 90 tablet, Rfl: 1    isosorbide mononitrate (IMDUR) 30 MG 24 hr tablet, TAKE 2 TABLETS(60 MG) BY MOUTH EVERY DAY, Disp: 180 tablet, Rfl: 3    lancets Misc, 1 lancet by Misc.(Non-Drug; Combo Route) route once daily., Disp: 100 each, Rfl: 3    multivitamin (THERAGRAN) per tablet, Take 1 tablet by mouth once daily., Disp: , Rfl:     vitamin D (VITAMIN D3) 1000 units Tab, Take 1,000 Units by mouth twice a week. Monday AND THURSDAYS ONLY, Disp: , Rfl:     aspirin (ECOTRIN) 81 MG EC tablet, Take 1 tablet (81 mg total) by mouth 2 (two) times a day., Disp: 60 tablet, Rfl: 0    dulaglutide (TRULICITY) 4.5 mg/0.5 mL pen injector, Inject 4.5 mg into the skin every 7 days. (Patient not taking: Reported on 3/7/2024), Disp: 2 mL, Rfl: 3    glipiZIDE (GLUCOTROL) 5 MG tablet, Take 2 tablets (10 mg total) by mouth 2 (two) times daily with meals. (Patient not taking: Reported on 3/7/2024), Disp: 360 tablet, Rfl: 3    glucagon (GVOKE HYPOPEN 2-PACK) 1 mg/0.2 mL AtIn, Inject 1 Package into the skin as needed. (Patient not taking: Reported on 3/7/2024), Disp: 2 Syringe, Rfl: 0    insulin glargine,hum.rec.anlog (BASAGLAR KWIKPEN U-100 INSULIN SUBQ), Inject 22 Units into the skin every evening., Disp: , Rfl:     nitroGLYCERIN (NITROSTAT) 0.4 MG SL tablet, Place 1 tablet (0.4 mg total) under the tongue every 5 (five) minutes as needed for Chest pain., Disp: 25 tablet, Rfl: 6    Current Facility-Administered Medications:     hyaluronate sodium, stabilized (MONOVISC) Syrg, , Intra-articular, 1 time in Clinic/HOD, Peterson Sharma MD    Facility-Administered Medications Ordered in Other Visits:     0.9%  NaCl  "infusion, , Intravenous, Continuous, Doug De Anda MD      Objective:   BP (!) 113/56   Pulse 72   Ht 5' 2" (1.575 m)   Wt 105 kg (231 lb 7.7 oz)   LMP 01/09/2001 (Approximate)   SpO2 96%   BMI 42.34 kg/m²     Physical Exam  Gen: No apparent distress, resting comfortably  HEENT: Pupils equal and reactive to light  Cardio: Regular rate, point of maximal impulse not displaced, 2/6 systolic murmur at RUSB, 2+ radial pulses bilaterally, 2+ DP pulses bilaterally  Resp: CTAB, no wheezing  Abd: Soft, non-tender, non-distended  Skin: Warm, dry, no peripheral edema noted  Neuro: Alert and oriented x3  Psych: Normal mood and affect      Labs:     Lab Results   Component Value Date     (L) 03/05/2024    K 4.3 03/05/2024     03/05/2024    CO2 23 03/05/2024    BUN 23 03/05/2024    CREATININE 1.4 03/05/2024    ANIONGAP 8 03/05/2024     Lab Results   Component Value Date    HGBA1C 6.5 (H) 01/24/2024     Lab Results   Component Value Date     (H) 06/20/2022    BNP 54 08/24/2021     (H) 10/26/2019       Lab Results   Component Value Date    WBC 5.70 01/03/2024    HGB 11.1 (L) 01/03/2024    HCT 34.7 (L) 01/03/2024    HCT 25 (L) 08/12/2019     01/03/2024    GRAN 4.5 01/03/2024    GRAN 78.5 (H) 01/03/2024     Lab Results   Component Value Date    CHOL 103 (L) 12/05/2023    HDL 32 (L) 12/05/2023    LDLCALC 52.8 (L) 12/05/2023    TRIG 91 12/05/2023       Lab Results   Component Value Date     (L) 03/05/2024    K 4.3 03/05/2024     03/05/2024    CO2 23 03/05/2024    BUN 23 03/05/2024    CREATININE 1.4 03/05/2024    ANIONGAP 8 03/05/2024     Lab Results   Component Value Date    HGBA1C 6.5 (H) 01/24/2024     Lab Results   Component Value Date     (H) 06/20/2022    BNP 54 08/24/2021     (H) 10/26/2019    Lab Results   Component Value Date    WBC 5.70 01/03/2024    HGB 11.1 (L) 01/03/2024    HCT 34.7 (L) 01/03/2024    HCT 25 (L) 08/12/2019     01/03/2024    GRAN 4.5 " 01/03/2024    GRAN 78.5 (H) 01/03/2024     Lab Results   Component Value Date    CHOL 103 (L) 12/05/2023    HDL 32 (L) 12/05/2023    LDLCALC 52.8 (L) 12/05/2023    TRIG 91 12/05/2023                Cardiovascular Imaging:     Echo 7/22/22:  The left ventricle is normal in size with concentric remodeling and normal systolic function.  The estimated ejection fraction is 68%.  Indeterminate left ventricular diastolic function.  Normal right ventricular size with normal right ventricular systolic function.  There is mild aortic valve stenosis.  Aortic valve area is 2.18 cm2; peak velocity is 2.04 m/s; mean gradient is 11 mmHg.  Intermediate central venous pressure (8 mmHg).  The estimated PA systolic pressure is 27 mmHg.     Stress test:     [facility]:    Assessment & Plan:       CAD s/p CABG s/p BEA, aortic atherosclerosis  No chest pain  Very small (3%) region of ischemia on 2022 PET  Continue DAPT, high intensity statin, beta blocker, ARB, imdur     HFpEF  Class C, NYHA II  Continue beta blocker, ARB, lasix  SGLT2i may be beneficial, however she takes many medications and we will only add more if needed     Mild aortic stenosis  Echo every 3-5 yrs, next in 2025     Essential hypertension  BP at goal today     Dyslipidemia, goal LDL below 70  LDL at goal  Continue statin     History of TIA (transient ischemic attack)  Continue secondary prevention measures as above     Stage 3a chronic kidney disease  Stable     Type 2 diabetes mellitus with stage 3 chronic kidney disease and hypertension  Followed by PCP     Morbid obesity with body mass index (BMI) of 40.0 or higher  Discussed diet/lifestyle modification     KAMRAN on CPAP  Continue CPAP    Case staffed with Dr. Bynum. RTC in one year or sooner if needed.     Signed:  Cuco Montoya MD  [facility] PGY-6    Staff attestation to follow.

## 2024-03-08 ENCOUNTER — PATIENT MESSAGE (OUTPATIENT)
Dept: ENDOCRINOLOGY | Facility: CLINIC | Age: 79
End: 2024-03-08
Payer: MEDICARE

## 2024-03-22 ENCOUNTER — PATIENT MESSAGE (OUTPATIENT)
Dept: ADMINISTRATIVE | Facility: OTHER | Age: 79
End: 2024-03-22
Payer: MEDICARE

## 2024-03-22 ENCOUNTER — PATIENT MESSAGE (OUTPATIENT)
Dept: ADMINISTRATIVE | Facility: HOSPITAL | Age: 79
End: 2024-03-22
Payer: MEDICARE

## 2024-03-26 ENCOUNTER — HOSPITAL ENCOUNTER (OUTPATIENT)
Dept: RADIOLOGY | Facility: HOSPITAL | Age: 79
Discharge: HOME | End: 2024-03-26
Attending: ORTHOPAEDIC SURGERY
Payer: MEDICARE

## 2024-03-26 ENCOUNTER — OFFICE VISIT (OUTPATIENT)
Dept: ORTHOPEDICS | Facility: CLINIC | Age: 79
End: 2024-03-26
Payer: MEDICARE

## 2024-03-26 DIAGNOSIS — Z96.651 STATUS POST TOTAL KNEE REPLACEMENT, RIGHT: Primary | ICD-10-CM

## 2024-03-26 DIAGNOSIS — Z96.651 STATUS POST RIGHT KNEE REPLACEMENT: ICD-10-CM

## 2024-03-26 PROCEDURE — 99213 OFFICE O/P EST LOW 20 MIN: CPT | Mod: S$GLB,,, | Performed by: ORTHOPAEDIC SURGERY

## 2024-03-26 PROCEDURE — 73562 X-RAY EXAM OF KNEE 3: CPT | Mod: 26,RT,, | Performed by: STUDENT IN AN ORGANIZED HEALTH CARE EDUCATION/TRAINING PROGRAM

## 2024-03-26 PROCEDURE — 99999 PR PBB SHADOW E&M-EST. PATIENT-LVL IV: CPT | Mod: PBBFAC,,, | Performed by: ORTHOPAEDIC SURGERY

## 2024-03-26 PROCEDURE — 73562 X-RAY EXAM OF KNEE 3: CPT | Mod: TC,RT

## 2024-03-27 ENCOUNTER — OFFICE VISIT (OUTPATIENT)
Dept: ORTHOPEDICS | Facility: CLINIC | Age: 79
End: 2024-03-27
Payer: MEDICARE

## 2024-03-27 ENCOUNTER — PATIENT MESSAGE (OUTPATIENT)
Dept: ENDOCRINOLOGY | Facility: CLINIC | Age: 79
End: 2024-03-27
Payer: MEDICARE

## 2024-03-27 VITALS — BODY MASS INDEX: 42.88 KG/M2 | WEIGHT: 234.44 LBS

## 2024-03-27 DIAGNOSIS — G95.9 CERVICAL MYELOPATHY: ICD-10-CM

## 2024-03-27 DIAGNOSIS — M50.30 DDD (DEGENERATIVE DISC DISEASE), CERVICAL: Primary | ICD-10-CM

## 2024-03-27 PROCEDURE — 99999 PR PBB SHADOW E&M-EST. PATIENT-LVL IV: CPT | Mod: PBBFAC,,, | Performed by: ORTHOPAEDIC SURGERY

## 2024-03-27 PROCEDURE — 99214 OFFICE O/P EST MOD 30 MIN: CPT | Mod: S$GLB,,, | Performed by: ORTHOPAEDIC SURGERY

## 2024-03-27 NOTE — PROGRESS NOTES
DATE: 3/27/2024  PATIENT: Kaylee Romero      HISTORY:  Kaylee Romero is a 79 y.o. female who returns to me today for surgical discussion after MRI C spine and L spine. She had Left TF ASHELY on 11/15/23 which gave 90% relief in her left leg pain. The patient endorses myelopathic symptoms such as handwriting changes or difficulty with buttons/coins/keys. + gait disturbance / balance problems. Denies perineal paresthesias, bowel/bladder dysfunction.    INTERVAL HISTORY 3/27/24:  Patient returns today for follow up. Reports that she had an injection (lumbar spine) back in November and reports that her pain is now completley gone as well as the weakness in her leg. She reports that she has stopped dropping things and is no longer using her walker and cane. Reports that she went on a ketone diet which resulted in weight loss and improved blood sugar control and she is no longer taking any diabetic medications.    PMH/PSH/FamHx/SocHx:  Unchanged from prior visit      EXAM:  Wt 106.4 kg (234 lb 7.4 oz)   LMP 01/09/2001 (Approximate)   BMI 42.88 kg/m²   Stable.     IMAGING:    Today I personally re- reviewed AP, Lat and Flex/Ex  upright L-spine that demonstrate mild anterolisthesis of L4 in relation to L5. Moderate DDD throughout.       Lumbar MRI shows bilateral foraminal narrowing from L4-S1 with a left paracentral disc protrusion at L5/S1. No significant stenosis.      Hip MRI shows suspected tear involving the anterior cartilaginous labrum with markedly frayed margins on a degenerative basis. Chronic degenerative changes of the left hip articulation with minimal surface fraying and cartilage particularly along the the apical surface.    Thoracic MRI shows mild foramina narrowing and stenosis from T1-3.    Cervical MRI shows moderate stenosis from C4-7 with multilevel foraminal narrowing.     Body mass index is 42.88 kg/m².    Hemoglobin A1C   Date Value Ref Range Status   01/24/2024 6.5 (H) 4.0 - 5.6 % Final      Comment:     ADA Screening Guidelines:  5.7-6.4%  Consistent with prediabetes  >or=6.5%  Consistent with diabetes    High levels of fetal hemoglobin interfere with the HbA1C  assay. Heterozygous hemoglobin variants (HbS, HgC, etc)do  not significantly interfere with this assay.   However, presence of multiple variants may affect accuracy.     12/05/2023 6.4 (H) 4.0 - 5.6 % Final     Comment:     ADA Screening Guidelines:  5.7-6.4%  Consistent with prediabetes  >or=6.5%  Consistent with diabetes    High levels of fetal hemoglobin interfere with the HbA1C  assay. Heterozygous hemoglobin variants (HbS, HgC, etc)do  not significantly interfere with this assay.   However, presence of multiple variants may affect accuracy.     07/18/2023 6.3 (H) 4.0 - 5.6 % Final     Comment:     ADA Screening Guidelines:  5.7-6.4%  Consistent with prediabetes  >or=6.5%  Consistent with diabetes    High levels of fetal hemoglobin interfere with the HbA1C  assay. Heterozygous hemoglobin variants (HbS, HgC, etc)do  not significantly interfere with this assay.   However, presence of multiple variants may affect accuracy.           ASSESSMENT/PLAN:    Diagnoses and all orders for this visit:    DDD (degenerative disc disease), cervical    Cervical myelopathy        We discussed C4-7 ACDF given cervical stenosis with myelopathy. However, patient feels that her symptoms are somewhat improved and stable, so she would like to defer surgical intervention.    FU with cardiologist as previously scheduled    3 month fu with KIRAN for repeat evaluation

## 2024-03-29 PROBLEM — Z96.651 STATUS POST TOTAL KNEE REPLACEMENT, RIGHT: Status: ACTIVE | Noted: 2024-03-29

## 2024-03-29 NOTE — PROGRESS NOTES
Subjective:      Patient ID: Kaylee Romero is a 79 y.o. female.    Chief Complaint:   Post-op Evaluation of the Right Knee    HPI  She returns about 1 year out from right TKA.  She has no significant knee pain, and she is pleased with her result.  No new symptoms to report.  No functional limitations.      Objective:        Ortho/SPM Exam    On exam, the scar is well healed without redness or tenderness.  There is no effusion.  Active range of motion is 0-115° without crepitus.  No instability to varus/valgus stress in extension or flexion.  No excessive sagittal plane instability.  Calves soft, nontender.  Distal neurovascular intact.        IMAGING:  Weightbearing x-rays show well-fixed and positioned total knee arthroplasty components without signs of loosening or other complications  Assessment:   Progressing well status post right TKA      Plan:   Follow-up as needed    The patient's pathophysiology was explained in detail with reference to x-rays, models, other visual aids as appropriate.  Treatment options were discussed in detail.  Questions were invited and answered to the patient's satisfaction.      Peterson Sharma MD  Orthopedic Surgery

## 2024-04-01 ENCOUNTER — HOSPITAL ENCOUNTER (OUTPATIENT)
Dept: RADIOLOGY | Facility: HOSPITAL | Age: 79
Discharge: HOME | End: 2024-04-01
Attending: INTERNAL MEDICINE
Payer: MEDICARE

## 2024-04-01 DIAGNOSIS — N18.31 STAGE 3A CHRONIC KIDNEY DISEASE: ICD-10-CM

## 2024-04-01 PROCEDURE — 76770 US EXAM ABDO BACK WALL COMP: CPT | Mod: 26,,, | Performed by: RADIOLOGY

## 2024-04-01 PROCEDURE — 76770 US EXAM ABDO BACK WALL COMP: CPT | Mod: TC

## 2024-04-02 ENCOUNTER — TELEPHONE (OUTPATIENT)
Dept: ENDOCRINOLOGY | Facility: CLINIC | Age: 79
End: 2024-04-02
Payer: MEDICARE

## 2024-04-02 NOTE — TELEPHONE ENCOUNTER
Returned patient's call about a nutrition consultation. Provider stated insurance does not cover.

## 2024-04-09 ENCOUNTER — LAB VISIT (OUTPATIENT)
Dept: LAB | Facility: HOSPITAL | Age: 79
End: 2024-04-09
Attending: NURSE PRACTITIONER
Payer: MEDICARE

## 2024-04-09 DIAGNOSIS — E11.22 TYPE 2 DIABETES MELLITUS WITH STAGE 3A CHRONIC KIDNEY DISEASE, WITHOUT LONG-TERM CURRENT USE OF INSULIN: Chronic | ICD-10-CM

## 2024-04-09 DIAGNOSIS — N18.31 TYPE 2 DIABETES MELLITUS WITH STAGE 3A CHRONIC KIDNEY DISEASE, WITHOUT LONG-TERM CURRENT USE OF INSULIN: Chronic | ICD-10-CM

## 2024-04-09 LAB
ALBUMIN SERPL BCP-MCNC: 3.4 G/DL (ref 3.5–5.2)
ALP SERPL-CCNC: 78 U/L (ref 55–135)
ALT SERPL W/O P-5'-P-CCNC: 18 U/L (ref 10–44)
ANION GAP SERPL CALC-SCNC: 8 MMOL/L (ref 8–16)
AST SERPL-CCNC: 18 U/L (ref 10–40)
BILIRUB SERPL-MCNC: 0.5 MG/DL (ref 0.1–1)
BUN SERPL-MCNC: 17 MG/DL (ref 8–23)
CALCIUM SERPL-MCNC: 9.9 MG/DL (ref 8.7–10.5)
CHLORIDE SERPL-SCNC: 104 MMOL/L (ref 95–110)
CO2 SERPL-SCNC: 24 MMOL/L (ref 23–29)
CREAT SERPL-MCNC: 1.2 MG/DL (ref 0.5–1.4)
EST. GFR  (NO RACE VARIABLE): 46.6 ML/MIN/1.73 M^2
ESTIMATED AVG GLUCOSE: 157 MG/DL (ref 68–131)
GLUCOSE SERPL-MCNC: 238 MG/DL (ref 70–110)
HBA1C MFR BLD: 7.1 % (ref 4–5.6)
POTASSIUM SERPL-SCNC: 4.4 MMOL/L (ref 3.5–5.1)
PROT SERPL-MCNC: 7.7 G/DL (ref 6–8.4)
SODIUM SERPL-SCNC: 136 MMOL/L (ref 136–145)

## 2024-04-09 PROCEDURE — 80053 COMPREHEN METABOLIC PANEL: CPT | Performed by: NURSE PRACTITIONER

## 2024-04-09 PROCEDURE — 83036 HEMOGLOBIN GLYCOSYLATED A1C: CPT | Performed by: NURSE PRACTITIONER

## 2024-04-09 PROCEDURE — 36415 COLL VENOUS BLD VENIPUNCTURE: CPT | Mod: PN | Performed by: NURSE PRACTITIONER

## 2024-04-10 DIAGNOSIS — E11.22 TYPE 2 DIABETES MELLITUS WITH STAGE 3A CHRONIC KIDNEY DISEASE, WITHOUT LONG-TERM CURRENT USE OF INSULIN: Primary | ICD-10-CM

## 2024-04-10 DIAGNOSIS — N18.31 TYPE 2 DIABETES MELLITUS WITH STAGE 3A CHRONIC KIDNEY DISEASE, WITHOUT LONG-TERM CURRENT USE OF INSULIN: Primary | ICD-10-CM

## 2024-04-18 NOTE — ASSESSMENT & PLAN NOTE
-- Continue fosamax weekly as long as GFR supports  -- Reviewed basics of quantity versus quality  -- Reassuring she is not fracturing   -- RDA of calcium (3169-7105 mg /day in divided doses) and vitamin D 2000iu a day  discussed  -- Calcium from food sources preferred  -- Fall precautions emphasized  -- +weight bearing exercise  -- Repeat DEXA scan in June 2025  Consider drug holiday after next bone density

## 2024-04-18 NOTE — ASSESSMENT & PLAN NOTE
Encouraged continued exercise and diet.  Given information on diet  Restart trulicity that will aid in weight loss.

## 2024-04-18 NOTE — PROGRESS NOTES
Patient ID: Kaylee Romero is a 79 y.o. female    Chief Complaint:   No chief complaint on file.    HPI: Kaylee Romero with type 2 diabetes complicated by CAD s/p CABG, TIAs, hypertension, dyslipidemia, CKD Stage 3, obesity, osteoporosis, and KAMRAN presents for follow up of Type 2 diabetes and osteoporosis. Previous patient of mine. Last visit in Dec 2023    She had right knee arthroscope in March 2023. She is no longer using walker or cane.     At her Sept 2020 she had a steroid injection and gave extra long acting insulin. Interim visit for lower blood sugars in Sept 2020 (BGs in 40-60's) and we stopped her basaglar as she gave up sugar.     Since her last visit she has been using dexcom and loves it.   Since her last visit she has started diet. She has been able to stop insulin but had to resume glipizide with biggest meal of the day. She has also stopped trulicity since Feb 2024. She has informed her nephrologist of her diet and was told to decrease her protein intake     CGMS in Jan 2022  Average glucose: 158   Above 180 mg/dL: 30.9% (Above 250 mg/dL: 4.0%)   Within  mg/dL: 67.5%   Below 70 mg/dL: 1.7%  (Below 54 mg/dL: <1%)    Continue   Trulicity 4.5 mg weekly   Glipizide 10 mg twice daily with meals   Decrease   basaglar to 22 units daily      With regards to the diabetes:     Diagnosed with Type 2 diabetes: 2013  Family history of Type 2 diabetes: father  Known complications:  DKA-  RN-; note reviewed; 5/2023  PN- seeing podiatry 3/2024; denies tingling in her hands   Nephropathy: now seeing nephrology 2/2024  Gastroparesis: denies   CAD: 3 MI and one stroke     Current regimen:  Glipizide 5 mg with biggest meal of the day     NOVOLOG PRN steroid injection-none recent    Other medications tried:  Jardiance-insurance issues  Basaglar  Chinyere - did not want to start due to SE profile  Trulicity      Glucose Monitor:  Dexcom G7  Dexcom: average blood sugar 182; 0% low; 51% in range; 44% high; 5%  very high  Dexcom:higher blood sugars during the day     Hypoglycemia: none  Denies symptoms of hypoglycemia-hypoglycemia unawareness     Knows how to correct with 15 grams of carbs- juice, coke, or a peppermint.      Diet/Exercise: She was seeing a dietician -Jimmy. She is now on protein shakes and food -careful with her food choices.   Usually gives up sweets for LENT.   Strawberries and prunes to treat hypoglycemia.  Snacks: more sweets lately  Drinks: mostly water  Exercise - she is doing bike 60 minutes 1 times a week and planning on increasing     Education - last visit: saw in Nov 2023  Has GVOKE    Denies history of pancreatitis & personal/family history of medullary thyroid cancer.    Lab Results   Component Value Date    HGBA1C 7.1 (H) 04/09/2024    HGBA1C 6.5 (H) 01/24/2024    HGBA1C 6.4 (H) 12/05/2023     Screening or Prevention Patient's value Goal Complete/Controlled?   HgA1C Testing and Control   Lab Results   Component Value Date    HGBA1C 7.1 (H) 04/09/2024      Annually/Less than 8% Yes   Lipid profile : 12/05/2023 Annually Yes   LDL control Lab Results   Component Value Date    LDLCALC 52.8 (L) 12/05/2023    Annually/Less than 100 mg/dl  Yes   Nephropathy screening Lab Results   Component Value Date    LABMICR 5.0 07/18/2023     Lab Results   Component Value Date    PROTEINUA Trace (A) 03/05/2024    Annually No   Blood pressure BP Readings from Last 1 Encounters:   04/19/24 110/68    Less than 140/90 Yes   Dilated retinal exam : 05/26/2023 Annually Yes   Foot exam   : 09/05/2023 Annually Yes     With regards to osteoporosis:   Current meds: Fosamax  Started: 6/2019    Family history: mother     Calcium intake- no calcium supplementation; has in diet   Vit D intake-  1000 twice weekly   Weight bearing exercise-   Walking as tolerated with knee pain    Recent falls- August 2018; September 2018 - no fractures and no falls from a standing position.      Steroid injection in spine around  11/15/23  Numbness caused falls below   3 falls in 6 weeks from Oct 2023-Nov 2023   Pain med steroids in Nov 2023     They have made fall precautions in house   No recent fractures   No significant height loss (>2 inches)     tob use -  None  etoh use- some 1-2 glasses of wine every once in awhile     No current diarrhea or h/o malabsorption     Menopause at age 56     Denies chronic exposure to anticoagulants, proton pump inhibitors or antiseizure medications.   +Steroid injections knees     She is using walker at home  She is using wheelchair in clinic    Denies GERD, indigestion, or gastric bypass surgery.      Denies history of thyroid disease, anemia, kidney stones or kidney disease.      Denies active malignancy, history of malignancy the involved the bone, prior radiation treatment, or unexplained elevations of alk phos on labs      BMD 6/9/2023  FINDINGS:  Lumbar spine (L1-L4):               T-score is 0.7, and Z-score is 2.5.     Compared with previous DXA, BMD at the lumbar spine has remained stable.     Femoral neck:                          T-score is -1.9, and Z-score is -0.6.     Total hip:                                T-score is -0.8, and Z-score is 0.1.     Compared with previous DXA, BMD at the total hip has remained stable.     Distal 1/3 radius:                      Not applicable      Fracture Risk (FRAX)   7.7% risk of a major osteoporotic fracture in the next 10 years.   1.5% risk of hip fracture in the next 10 years.     Impression:   *Osteoporosis on treatment with Fosamax     RECOMMENDATIONS:  *Daily calcium intake 9782-8625 mg, dietary sources preferred; Vitamin D 5784-1905 IU daily.  *Weight bearing exercise and fall precautions.  *If the patient has been treated with an oral bisphosphonate for a period of at least 5 years and has not fractured, a drug holiday can be considered. Continuing treatment is also reasonable if the patient is deemed to be at high risk for fracture.  *Repeat BMD  in 2 years.     With regards to the vitamin d deficiency and hypercalcemia,     Transient increase in calcium level in Sept 2021 that normalized when we decrease Vitamin D and stopped chlorthalidone     Currently taking otc 1000 twice weekly    Lab Results   Component Value Date    PTH 53.1 01/06/2022    CALCIUM 9.9 04/09/2024    PHOS 3.3 01/24/2024     Lab Results   Component Value Date    ALBUMIN 3.4 (L) 04/09/2024     Vit D, 25-Hydroxy   Date Value Ref Range Status   01/03/2024 40 30 - 96 ng/mL Final     Comment:     Vitamin D deficiency.........<10 ng/mL                              Vitamin D insufficiency......10-29 ng/mL       Vitamin D sufficiency........> or equal to 30 ng/mL  Vitamin D toxicity............>100 ng/mL       Denies constipation, depression,   Increased polyuria due to lasix  + muscle aches or pains.    No recent fractures     Has seen GI for diarrhea and does take Lomtil with some relief.     Review of Systems   Constitutional:  Negative for fatigue and unexpected weight change.   Eyes:  Negative for visual disturbance.   Endocrine: Negative for polydipsia, polyphagia and polyuria.   Musculoskeletal:  Positive for arthralgias and myalgias. Negative for gait problem and neck pain.   Hematological:  Does not bruise/bleed easily.     As above    OBJECTIVE    Physical Exam  Constitutional:       Appearance: Normal appearance.   Neck:      Thyroid: No thyromegaly.   Pulmonary:      Effort: Pulmonary effort is normal.   Neurological:      Mental Status: She is alert.     injection sites are without edema or erythema. No lipo hypertropthy or atrophy  DM foot exam deferred; done in 9/2023    Wt Readings from Last 3 Encounters:   04/19/24 1347 104.8 kg (230 lb 14.9 oz)   03/27/24 1350 106.4 kg (234 lb 7.4 oz)   03/07/24 1401 105 kg (231 lb 7.7 oz)     Vitals:    04/19/24 1347   BP: 110/68   Pulse: 69           Current Outpatient Medications:     acetaminophen (TYLENOL) 500 MG tablet, Take 500 mg by  "mouth 2 (two) times daily as needed for Pain., Disp: , Rfl:     alendronate (FOSAMAX) 70 MG tablet, Take 1 tablet (70 mg total) by mouth every 7 days., Disp: 12 tablet, Rfl: 1    amLODIPine (NORVASC) 10 MG tablet, Take 1 tablet (10 mg total) by mouth once daily., Disp: 90 tablet, Rfl: 1    ammonium lactate (LAC-HYDRIN) 12 % lotion, Apply topically as needed for Dry Skin. On the feet and toenails., Disp: 225 g, Rfl: 6    AREXVY, PF, 120 mcg/0.5 mL SusR vaccine, , Disp: , Rfl:     atorvastatin (LIPITOR) 80 MG tablet, Take 1 tablet (80 mg total) by mouth once daily., Disp: 90 tablet, Rfl: 3    BD BOO 2ND GEN PEN NEEDLE 32 gauge x 5/32" Ndle, To injection daily, Disp: 100 each, Rfl: 8    blood sugar diagnostic Strp, 1 strip by Misc.(Non-Drug; Combo Route) route once daily., Disp: 100 strip, Rfl: 3    blood-glucose meter,continuous (DEXCOM G7 ) Misc, To use with sensor, Disp: 1 each, Rfl: 0    blood-glucose sensor (DEXCOM G7 SENSOR) Areli, To change every 10 days, Disp: 3 each, Rfl: 3    carvediloL (COREG) 25 MG tablet, Take 1 tablet (25 mg total) by mouth 2 (two) times daily., Disp: 180 tablet, Rfl: 1    clopidogreL (PLAVIX) 75 mg tablet, Take 1 tablet (75 mg total) by mouth once daily., Disp: 90 tablet, Rfl: 3    COD LIVER OIL ORAL, TAKE 2 CAPSULES BY MOUTH EVERY MORNING AND 1 CAPSULE EVERY EVENING, Disp: , Rfl:     cyclobenzaprine (FLEXERIL) 10 MG tablet, Take 1/2 tablet by mouth three times a day as needed for muscle spasms, Disp: 90 tablet, Rfl: 1    diclofenac sodium (VOLTAREN) 1 % Gel, Apply 2 g topically 4 (four) times daily as needed. To painful area on the feet. (Patient taking differently: Apply 2 g topically 4 (four) times daily as needed. To painful area on the feet and knee), Disp: 500 g, Rfl: 5    diphenoxylate-atropine 2.5-0.025 mg (LOMOTIL) 2.5-0.025 mg per tablet, Take 1 tablet by mouth 4 (four) times daily as needed for Diarrhea., Disp: 30 tablet, Rfl: 1    ferrous sulfate (FEROSUL) 325 mg (65 " mg iron) Tab tablet, Take 1 tablet (325 mg total) by mouth once daily., Disp: 90 tablet, Rfl: 1    furosemide (LASIX) 20 MG tablet, TAKE 1 TABLET BY MOUTH ON SATURDAY, SUNDAY, MONDAY, WEDNESDAY AND FRIDAY, Disp: 30 tablet, Rfl: 3    gabapentin (NEURONTIN) 100 MG capsule, Take 1-2 capsules (100-200 mg total) by mouth every evening., Disp: 90 capsule, Rfl: 0    glipiZIDE (GLUCOTROL) 5 MG tablet, Take 2 tablets (10 mg total) by mouth 2 (two) times daily with meals., Disp: 360 tablet, Rfl: 3    glucagon (GVOKE HYPOPEN 2-PACK) 1 mg/0.2 mL AtIn, Inject 1 Package into the skin as needed., Disp: 2 Syringe, Rfl: 0    hepatitis A and B vaccine, PF, (TWINRIX) 720 ARIEL unit- 20 mcg/mL Syrg suspension, Inject into the muscle., Disp: 1 mL, Rfl: 2    hepatitis A and B vaccine, PF, (TWINRIX) 720 ARIEL unit- 20 mcg/mL Syrg suspension, Inject into the muscle., Disp: 1 mL, Rfl: 0    insulin glargine,hum.rec.anlog (BASAGLAR KWIKPEN U-100 INSULIN SUBQ), Inject 22 Units into the skin every evening., Disp: , Rfl:     irbesartan (AVAPRO) 300 MG tablet, Take 1 tablet (300 mg total) by mouth every evening., Disp: 90 tablet, Rfl: 1    isosorbide mononitrate (IMDUR) 30 MG 24 hr tablet, TAKE 2 TABLETS(60 MG) BY MOUTH EVERY DAY, Disp: 180 tablet, Rfl: 3    lancets Misc, 1 lancet by Misc.(Non-Drug; Combo Route) route once daily., Disp: 100 each, Rfl: 3    multivitamin (THERAGRAN) per tablet, Take 1 tablet by mouth once daily., Disp: , Rfl:     vitamin D (VITAMIN D3) 1000 units Tab, Take 1,000 Units by mouth twice a week. Monday AND THURSDAYS ONLY, Disp: , Rfl:     aspirin (ECOTRIN) 81 MG EC tablet, Take 1 tablet (81 mg total) by mouth 2 (two) times a day., Disp: 60 tablet, Rfl: 0    dulaglutide (TRULICITY) 4.5 mg/0.5 mL pen injector, Inject 4.5 mg into the skin every 7 days., Disp: 4 pen , Rfl: 37    nitroGLYCERIN (NITROSTAT) 0.4 MG SL tablet, Place 1 tablet (0.4 mg total) under the tongue every 5 (five) minutes as needed for Chest pain., Disp:  25 tablet, Rfl: 6    Current Facility-Administered Medications:     hyaluronate sodium, stabilized (MONOVISC) Syrg, , Intra-articular, 1 time in Clinic/HOD, Peterson Sharma MD    Facility-Administered Medications Ordered in Other Visits:     0.9%  NaCl infusion, , Intravenous, Continuous, Doug De Anda MD    Labs reviewed    Assessment and plan:   1. Type 2 diabetes mellitus with stage 3a chronic kidney disease, without long-term current use of insulin  Comprehensive Metabolic Panel    Hemoglobin A1C    Microalbumin/Creatinine Ratio, Urine      2. Osteoporosis without current pathological fracture, unspecified osteoporosis type        3. Class 3 severe obesity due to excess calories with serious comorbidity and body mass index (BMI) of 40.0 to 44.9 in adult        4. Dyslipidemia, goal LDL below 70        5. Essential hypertension        6. Type 2 diabetes mellitus with stage 3 chronic kidney disease and hypertension  dulaglutide (TRULICITY) 4.5 mg/0.5 mL pen injector        Type 2 diabetes mellitus with stage 3 chronic kidney disease, without long-term current use of insulin  -- Labs prior  -- A1c 7-7.5% without hypoglycemia.     Discussed her A1c is rising   Dexcom: average blood sugar 182; 0% low; 51% in range; 44% high; 5% very high    She is having hypoglycemia unawareness. I recommend dexcom for high and low blood sugar alerts. She also had hypoglycemia on labs BG 66 and did was not aware.      Patient has diabetes mellitus and manages diabetes with an intensive insulin regimen. The patient requires a therapeutic CGM and is willing to use therapeutic CGM for the necesary frequent adjustments of insulin therapy. Patient has been using SMBG for frequent glucose monitoring (4+ times daily). I have completed an in-person visit during the previous 6 months and will continue to have in-person visits every 6 months to assess adherence to their CGM regimen and diabetes treatment plan.     Medication Changes    Restart Trulicity 4.5 mg weekly  Continue glipizide 5 mg with biggest meal of the day in AM and consider increasing to 10 mg if blood sugar 2 hours after you eat still above 140    -- We will continue novolog for steroid injections. Discussed she will take the novolog before meals with sliding scale below.  With using correction scale:  MDD of:   150-200: +2 units  201-250: +4 units  251-300: +6 units  301-350: +8 units  >350: +10 units    -- Has GVOKE pen.   -- She is fearful of DKA Discussed signs and symptoms of DKA and instructed to buy ketone strips  -- Reviewed goals of therapy are to get the best control we can without hypoglycemia  -- Insulin injection sites and proper rotation instructed.    -- Advised frequent self blood glucose monitoring.  Patient encouraged to document glucose results and bring them to every clinic visit.  -- Hypoglycemia precautions discussed. Instructed on precautions before driving.    -- Call for Bg repeatedly < 90 or > 180.   -- Close adherence to lifestyle changes recommended.   -- Periodic follow ups for eye evaluations, foot care and dental care suggested. UTD    Osteoporosis without current pathological fracture  -- Continue fosamax weekly as long as GFR supports  -- Reviewed basics of quantity versus quality  -- Reassuring she is not fracturing   -- RDA of calcium (6085-0555 mg /day in divided doses) and vitamin D 2000iu a day  discussed  -- Calcium from food sources preferred  -- Fall precautions emphasized  -- +weight bearing exercise  -- Repeat DEXA scan in June 2025  Consider drug holiday after next bone density     Class 3 severe obesity due to excess calories with serious comorbidity and body mass index (BMI) of 40.0 to 44.9 in adult  Encouraged continued exercise and diet.  Given information on diet  Restart trulicity that will aid in weight loss.    Dyslipidemia, goal LDL below 70  -- Controlled   -- On statin per ADA recommendations    Essential hypertension  -- on  ARB  -- Controlled  -- Blood pressure goals discussed with patient    Follow up in about 4 months (around 8/19/2024) for labs on RTC.  Labs prior to RTC

## 2024-04-18 NOTE — ASSESSMENT & PLAN NOTE
-- Labs prior  -- A1c 7-7.5% without hypoglycemia.     Discussed her A1c is rising   Dexcom: average blood sugar 182; 0% low; 51% in range; 44% high; 5% very high    She is having hypoglycemia unawareness. I recommend dexcom for high and low blood sugar alerts. She also had hypoglycemia on labs BG 66 and did was not aware.      Patient has diabetes mellitus and manages diabetes with an intensive insulin regimen. The patient requires a therapeutic CGM and is willing to use therapeutic CGM for the necesary frequent adjustments of insulin therapy. Patient has been using SMBG for frequent glucose monitoring (4+ times daily). I have completed an in-person visit during the previous 6 months and will continue to have in-person visits every 6 months to assess adherence to their CGM regimen and diabetes treatment plan.     Medication Changes   Restart Trulicity 4.5 mg weekly  Continue glipizide 5 mg with biggest meal of the day in AM and consider increasing to 10 mg if blood sugar 2 hours after you eat still above 140    -- We will continue novolog for steroid injections. Discussed she will take the novolog before meals with sliding scale below.  With using correction scale:  MDD of:   150-200: +2 units  201-250: +4 units  251-300: +6 units  301-350: +8 units  >350: +10 units    -- Has GVOKE pen.   -- She is fearful of DKA Discussed signs and symptoms of DKA and instructed to buy ketone strips  -- Reviewed goals of therapy are to get the best control we can without hypoglycemia  -- Insulin injection sites and proper rotation instructed.    -- Advised frequent self blood glucose monitoring.  Patient encouraged to document glucose results and bring them to every clinic visit.  -- Hypoglycemia precautions discussed. Instructed on precautions before driving.    -- Call for Bg repeatedly < 90 or > 180.   -- Close adherence to lifestyle changes recommended.   -- Periodic follow ups for eye evaluations, foot care and dental care  suggested. UTD

## 2024-04-19 ENCOUNTER — OFFICE VISIT (OUTPATIENT)
Dept: ENDOCRINOLOGY | Facility: CLINIC | Age: 79
End: 2024-04-19
Payer: MEDICARE

## 2024-04-19 VITALS
OXYGEN SATURATION: 98 % | BODY MASS INDEX: 40.92 KG/M2 | SYSTOLIC BLOOD PRESSURE: 110 MMHG | HEIGHT: 63 IN | WEIGHT: 230.94 LBS | HEART RATE: 69 BPM | DIASTOLIC BLOOD PRESSURE: 68 MMHG

## 2024-04-19 DIAGNOSIS — M81.0 OSTEOPOROSIS WITHOUT CURRENT PATHOLOGICAL FRACTURE, UNSPECIFIED OSTEOPOROSIS TYPE: Chronic | ICD-10-CM

## 2024-04-19 DIAGNOSIS — E78.5 DYSLIPIDEMIA, GOAL LDL BELOW 70: ICD-10-CM

## 2024-04-19 DIAGNOSIS — E11.22 TYPE 2 DIABETES MELLITUS WITH STAGE 3A CHRONIC KIDNEY DISEASE, WITHOUT LONG-TERM CURRENT USE OF INSULIN: Primary | Chronic | ICD-10-CM

## 2024-04-19 DIAGNOSIS — E66.01 CLASS 3 SEVERE OBESITY DUE TO EXCESS CALORIES WITH SERIOUS COMORBIDITY AND BODY MASS INDEX (BMI) OF 40.0 TO 44.9 IN ADULT: ICD-10-CM

## 2024-04-19 DIAGNOSIS — N18.31 TYPE 2 DIABETES MELLITUS WITH STAGE 3A CHRONIC KIDNEY DISEASE, WITHOUT LONG-TERM CURRENT USE OF INSULIN: Primary | Chronic | ICD-10-CM

## 2024-04-19 DIAGNOSIS — I12.9 TYPE 2 DIABETES MELLITUS WITH STAGE 3 CHRONIC KIDNEY DISEASE AND HYPERTENSION: ICD-10-CM

## 2024-04-19 DIAGNOSIS — I10 ESSENTIAL HYPERTENSION: Chronic | ICD-10-CM

## 2024-04-19 DIAGNOSIS — N18.30 TYPE 2 DIABETES MELLITUS WITH STAGE 3 CHRONIC KIDNEY DISEASE AND HYPERTENSION: ICD-10-CM

## 2024-04-19 DIAGNOSIS — E11.22 TYPE 2 DIABETES MELLITUS WITH STAGE 3 CHRONIC KIDNEY DISEASE AND HYPERTENSION: ICD-10-CM

## 2024-04-19 PROCEDURE — 3288F FALL RISK ASSESSMENT DOCD: CPT | Mod: CPTII,S$GLB,, | Performed by: NURSE PRACTITIONER

## 2024-04-19 PROCEDURE — 3072F LOW RISK FOR RETINOPATHY: CPT | Mod: CPTII,S$GLB,, | Performed by: NURSE PRACTITIONER

## 2024-04-19 PROCEDURE — 99999 PR PBB SHADOW E&M-EST. PATIENT-LVL V: CPT | Mod: PBBFAC,,, | Performed by: NURSE PRACTITIONER

## 2024-04-19 PROCEDURE — 1100F PTFALLS ASSESS-DOCD GE2>/YR: CPT | Mod: CPTII,S$GLB,, | Performed by: NURSE PRACTITIONER

## 2024-04-19 PROCEDURE — 1160F RVW MEDS BY RX/DR IN RCRD: CPT | Mod: CPTII,S$GLB,, | Performed by: NURSE PRACTITIONER

## 2024-04-19 PROCEDURE — 99214 OFFICE O/P EST MOD 30 MIN: CPT | Mod: 25,S$GLB,, | Performed by: NURSE PRACTITIONER

## 2024-04-19 PROCEDURE — 95251 CONT GLUC MNTR ANALYSIS I&R: CPT | Mod: S$GLB,,, | Performed by: NURSE PRACTITIONER

## 2024-04-19 PROCEDURE — 1126F AMNT PAIN NOTED NONE PRSNT: CPT | Mod: CPTII,S$GLB,, | Performed by: NURSE PRACTITIONER

## 2024-04-19 PROCEDURE — 3078F DIAST BP <80 MM HG: CPT | Mod: CPTII,S$GLB,, | Performed by: NURSE PRACTITIONER

## 2024-04-19 PROCEDURE — 1159F MED LIST DOCD IN RCRD: CPT | Mod: CPTII,S$GLB,, | Performed by: NURSE PRACTITIONER

## 2024-04-19 PROCEDURE — 3074F SYST BP LT 130 MM HG: CPT | Mod: CPTII,S$GLB,, | Performed by: NURSE PRACTITIONER

## 2024-04-19 RX ORDER — DULAGLUTIDE 4.5 MG/.5ML
4.5 INJECTION, SOLUTION SUBCUTANEOUS
Qty: 4 PEN | Refills: 37 | Status: SHIPPED | OUTPATIENT
Start: 2024-04-19 | End: 2027-05-20

## 2024-04-19 RX ORDER — RESPIRATORY SYNCYTIAL VISUS VACCINE RECOMBINANT, ADJUVANTED 120MCG/0.5
KIT INTRAMUSCULAR
COMMUNITY
Start: 2024-01-11

## 2024-04-19 NOTE — PATIENT INSTRUCTIONS
Please make appt with eye doctor      Diabetes  Discussed her A1c is rising   Dexcom: average blood sugar 182; 0% low; 51% in range; 44% high; 5% very high    She is having hypoglycemia unawareness. I recommend dexcom for high and low blood sugar alerts. She also had hypoglycemia on labs BG 66 and did was not aware.      Patient has diabetes mellitus and manages diabetes with an intensive insulin regimen. The patient requires a therapeutic CGM and is willing to use therapeutic CGM for the necesary frequent adjustments of insulin therapy. Patient has been using SMBG for frequent glucose monitoring (4+ times daily). I have completed an in-person visit during the previous 6 months and will continue to have in-person visits every 6 months to assess adherence to their CGM regimen and diabetes treatment plan.     Medication Changes   Restart Trulicity 4.5 mg weekly  Continue glipizide 5 mg with biggest meal of the day in AM and consider increasing to 10 mg if blood sugar 2 hours after you eat still above 140     Osteoporosis               Continue Vitamin D 2000 IU twice weekly               RDA of calcium is 5851-6688 mg daily, preferable from food               Avoid alcohol and tobacco and falls              Continue fosamax until 6/2024              Check bone density in June 2025    Carlitos's pizza sauce   Cauliflower pizza crust keto    G Castillo Sugar Free Sauce     Estimated Calcium Content of Foods:  Produce  Serving Size Estimated Calcium*    Jennifer greens, frozen 8 oz 360 mg   Broccoli evert 8 oz 200 mg   Kale, frozen 8 oz 180 mg   Soy Beans, green, boiled 8 oz 175 mg   Bok Vicky, cooked, boiled 8 oz 160 mg   Figs, dried 2 figs 65 mg   Broccoli, fresh, cooked 8 oz 60 mg   Oranges 1 whole 55 mg   Seafood Serving Size Estimated Calcium*    Sardines, canned with bones 3 oz 325 mg   [place], canned with bones 3 oz 180 mg   Shrimp, canned 3 oz 125 mg   Dairy Serving Size Estimated Calcium*    Ricotta, part-skim 4 oz 335 mg    Yogurt, plain, low-fat 6 oz 310 mg   Milk, skim, low-fat, whole 8 oz 300 mg   Yogurt with fruit, low-fat 6 oz 260 mg   Mozzarella, part-skim 1 oz 210 mg   Cheddar 1 oz 205 mg   Yogurt, Greek 6 oz 200 mg   American Cheese 1 oz 195 mg   Feta Cheese 4 oz 140 mg   Cottage Cheese, 2% 4 oz 105 mg   Frozen yogurt, vanilla 8 oz 105 mg   Ice Cream, vanilla 8 oz 85 mg   Parmesan 1 tbsp 55 mg   Fortified Food Serving Size Estimated Calcium*   [place] milk, rice milk or soy milk, fortified 8 oz 300 mg   Orange juice and other fruit juices, fortified 8 oz 300 mg   Tofu, prepared with calcium 4 oz 205 mg   Waffle, frozen, fortified 2 pieces 200 mg   Oatmeal, fortified 1 packet 140 mg   English muffin, fortified 1 muffin 100 mg   Cereal, fortified 8 oz 100-1,000 mg   Other Serving Size Estimated Calcium*   Mac & cheese, frozen 1 package 325 mg   Pizza, cheese, frozen 1 serving 115 mg   Pudding, chocolate, prepared with 2% milk 4 oz 160 mg   Beans, baked, canned 4 oz 160 mg   *The calcium content listed for most foods is estimated and can vary due to multiple factors. Check the food label to determine how much calcium is in a particular product.  If you read the nutrition label for a food source, it lists the % calcium in that food.  For an 8 oz glass of milk, for example, the label states calcium 30%.  This is equivalent to 300 mg of calcium (multiply the listed number by 10).   **Table from the National Osteoporosis Foundation,      Snacks can be an important part of a balanced, healthy meal plan. They allow you to eat more frequently, feeling full and satisfied throughout the day. Also, they allow you to spread carbohydrates evenly, which may stabilize blood sugars.  Plus, snacks are enjoyable!     The amount of carbohydrate needed at snacks varies. Generally, about 15-30 grams of carbohydrate per snack is recommended.  Below you will find some tasty treats.       0-5 gm carb  Crystal Light  Vitamin Water Zero  Herbal tea,  unsweetened  2 tsp peanut butter on celery  1./2 cup sugar-free jell-o  1 sugar-free popsicle  ¼ cup blueberries  8oz Blue Jennifer unsweetened almond milk  5 baby carrots & celery sticks, cucumbers, bell peppers dipped in ¼ cup salsa, 2Tbsp light ranch dressing or 2Tbsp plain Greek yogurt  10 Goldfish crackers  ½ oz low-fat cheese or string cheese  1 closed handful of nuts, unsalted  1 Tbsp of sunflower seeds, unsalted  1 cup Smart Pop popcorn  1 whole grain brown rice cake        15 gm carb  1 small piece of fruit or ½ banana or 1/2 cup lite canned fruit  3 jesse cracker squares  3 cups Smart Pop popcorn, top spray butter, [place] lite salt or cinnamon and Truvia  5 Vanilla Wafers  ½ cup low fat, no added sugar ice cream or frozen yogurt (Blue bell, Blue Bunny, Weight Watchers, Skinny Cow)  ½ turkey, ham, or chicken sandwich  ½ c fruit with ½ c Cottage cheese  4-6 unsalted wheat crackers with 1 oz low fat cheese or 1 tbsp peanut butter   30-45 goldfish crackers (depending on flavor)   7-8 Islam mini brown rice cakes (caramel, apple cinnamon, chocolate)   12 Islam mini brown rice cakes (cheddar, bbq, ranch)   1/3 cup hummus dip with raw veg  1/2 whole wheat yudy, 1Tbsp hummus  Mini Pizza (1/2 whole wheat English muffin, low-fat  cheese, tomato sauce)  100 calorie snack pack (Oreo, Chips Ahoy, Ritz Mix, Baked Cheetos)  4-6 oz. light or Greek Style yogurt (Chobani, Yoplait, Okios, [place])  ½ cup sugar-free pudding    6 in. wheat tortilla or yudy oven toasted chips (topped with spray butter flavoring, cinnamon, Truvia OR spray butter, garlic powder, chili powder)   18 BBQ Popchips (available at [employer], [employer], [employer])

## 2024-04-24 ENCOUNTER — TELEPHONE (OUTPATIENT)
Dept: PHARMACY | Facility: CLINIC | Age: 79
End: 2024-04-24
Payer: MEDICARE

## 2024-04-24 ENCOUNTER — PATIENT MESSAGE (OUTPATIENT)
Dept: PHARMACY | Facility: CLINIC | Age: 79
End: 2024-04-24
Payer: MEDICARE

## 2024-04-24 NOTE — TELEPHONE ENCOUNTER
We have reached out to Kaylee Romero to inform her of the [facility] Patient Assistance Program re-enrollment process for Trulicity 4.5 mg. Patient must provided the following documentation to re-apply for 2024: Proof of household Income( such as social security statement, 1099 form, pension statement or 3 consecutive pay stubs, Copy of all Insurance cards( front and back), and Signed and dated HIPAA /Patient Information Forms          Thank you   Pharmacy Patient Assistance

## 2024-04-24 NOTE — TELEPHONE ENCOUNTER
Kaylee Romero has provided the necessary documents to begin the enrollment process into the [facility] program. The prescription portion of the Trulicity 4.5 mg application has been sent to the providers office for approval and signature.

## 2024-04-24 NOTE — TELEPHONE ENCOUNTER
A Patient Assistance Application for Kaylee Romero - MRN  [id number] was faxed to your office @[phone]. Please have ANDREW Fox  review the application to ensure the prescription is correct. If correct, sign and fax the application back to the Pharmacy Patient Assistance Team @[phone].     Please do not write any corrections on this application.  If changes are needed on this application, please inform the PAP team, and the corrected application will be faxed back to your office for approval and signature.

## 2024-04-27 DIAGNOSIS — I11.0 HYPERTENSIVE HEART DISEASE WITH CHRONIC DIASTOLIC CONGESTIVE HEART FAILURE: ICD-10-CM

## 2024-04-27 DIAGNOSIS — I50.32 HYPERTENSIVE HEART DISEASE WITH CHRONIC DIASTOLIC CONGESTIVE HEART FAILURE: ICD-10-CM

## 2024-04-28 NOTE — TELEPHONE ENCOUNTER
Care Due:                  Date            Visit Type   Department     Provider  --------------------------------------------------------------------------------                                EP -                              PRIMARY      [facility] PRIMARY   Liz Barrera  Last Visit: 01-      CARE ([facility])   CARE           Degrancem                              EP -                              PRIMARY      [facility] PRIMARY   Liz Barrera  Next Visit: 07-      CARE ([facility])   CARE           Armando                                                            Last  Test          Frequency    Reason                     Performed    Due Date  --------------------------------------------------------------------------------    Mg Level....  12 months..  alendronate..............  06- 06-    [facility] Embedded Care Due Messages. Reference number: [id number].   4/27/2024 9:16:20 PM CDT

## 2024-04-29 RX ORDER — CARVEDILOL 25 MG/1
25 TABLET ORAL 2 TIMES DAILY WITH MEALS
Qty: 180 TABLET | Refills: 2 | Status: SHIPPED | OUTPATIENT
Start: 2024-04-29

## 2024-04-29 NOTE — TELEPHONE ENCOUNTER
Refill Decision Note   Kaylee Romero  is requesting a refill authorization.  Brief Assessment and Rationale for Refill:  Approve     Medication Therapy Plan:  FLOS 06/21/24      Comments:     Note composed:9:53 AM 04/29/2024

## 2024-05-27 ENCOUNTER — PATIENT MESSAGE (OUTPATIENT)
Dept: GASTROENTEROLOGY | Facility: CLINIC | Age: 79
End: 2024-05-27
Payer: MEDICARE

## 2024-06-01 DIAGNOSIS — K59.1 FUNCTIONAL DIARRHEA: ICD-10-CM

## 2024-06-03 ENCOUNTER — PATIENT MESSAGE (OUTPATIENT)
Dept: ENDOCRINOLOGY | Facility: CLINIC | Age: 79
End: 2024-06-03
Payer: MEDICARE

## 2024-06-03 ENCOUNTER — PATIENT MESSAGE (OUTPATIENT)
Dept: INTERNAL MEDICINE | Facility: CLINIC | Age: 79
End: 2024-06-03
Payer: MEDICARE

## 2024-06-03 NOTE — TELEPHONE ENCOUNTER
Received correspondence from [email or web address] regarding incomplete application submission. The PAP team did not submit an application to [email or web address]. We're still waiting on Provider to sign prescription section of application and return to PAP office. Messaged provider for status on signed prescription section of application

## 2024-06-04 ENCOUNTER — PATIENT MESSAGE (OUTPATIENT)
Dept: PHARMACY | Facility: CLINIC | Age: 79
End: 2024-06-04
Payer: MEDICARE

## 2024-06-04 RX ORDER — DIPHENOXYLATE HYDROCHLORIDE AND ATROPINE SULFATE 2.5; .025 MG/1; MG/1
1 TABLET ORAL 4 TIMES DAILY PRN
Qty: 30 TABLET | Refills: 0 | Status: SHIPPED | OUTPATIENT
Start: 2024-06-04 | End: 2024-06-18 | Stop reason: SDUPTHER

## 2024-06-04 NOTE — TELEPHONE ENCOUNTER
The prescription portion of Ms. Romero 's Trulicity 4.5 mg application was received from the providers office. The completed patient assistance application has been submitted to  [facility] for consideration of eligibility.

## 2024-06-05 ENCOUNTER — PATIENT MESSAGE (OUTPATIENT)
Dept: PHARMACY | Facility: CLINIC | Age: 79
End: 2024-06-05
Payer: MEDICARE

## 2024-06-05 NOTE — TELEPHONE ENCOUNTER
We are pleased to inform you Kaylee Romero has been approved in the [employer] [facility] Patient Assistance Program.  [facility] has shared this information with your patient.   Approval Details  Eligibility start Date: 06/04/2024  Eligibility end date: 12/31/2024 (Updated proof of eligibility required to re-enroll for 2025 calendar year).  Approved Medication: Trulicity 4.5 mg  Day supply: 120  Allotted Refills: 2 (Please be advised Program allows only 3 refills per eligibility period regardless of changes in strength).   Estimated Shipping: 10-14 business days or longer depending on availability and/or projected refill date  Shipping Location: Patient's Home (You and your patient will receive [facility] communication [facility] once Medication is received)                                            Important Note  It is imperative that any changes to strength or discontinuation of this medication be referred to the Pharmacy Patient Assistance Team Pool Via [employer] to prevent Gaps in therapy.

## 2024-06-09 DIAGNOSIS — M81.0 OSTEOPOROSIS WITHOUT CURRENT PATHOLOGICAL FRACTURE, UNSPECIFIED OSTEOPOROSIS TYPE: ICD-10-CM

## 2024-06-09 NOTE — TELEPHONE ENCOUNTER
No care due was identified.  [facility] Embedded Care Due Messages. Reference number: [id number].   6/09/2024 2:36:39 PM CDT

## 2024-06-10 RX ORDER — ALENDRONATE SODIUM 70 MG/1
70 TABLET ORAL
Qty: 12 TABLET | Refills: 0 | Status: SHIPPED | OUTPATIENT
Start: 2024-06-10

## 2024-06-11 ENCOUNTER — OFFICE VISIT (OUTPATIENT)
Dept: PODIATRY | Facility: CLINIC | Age: 79
End: 2024-06-11
Payer: MEDICARE

## 2024-06-11 VITALS
SYSTOLIC BLOOD PRESSURE: 103 MMHG | HEART RATE: 92 BPM | HEIGHT: 63 IN | BODY MASS INDEX: 40.94 KG/M2 | DIASTOLIC BLOOD PRESSURE: 67 MMHG | WEIGHT: 231.06 LBS

## 2024-06-11 DIAGNOSIS — B35.1 DERMATOPHYTOSIS OF NAIL: ICD-10-CM

## 2024-06-11 DIAGNOSIS — E11.49 TYPE II DIABETES MELLITUS WITH NEUROLOGICAL MANIFESTATIONS: Primary | ICD-10-CM

## 2024-06-11 DIAGNOSIS — L84 CORN OR CALLUS: ICD-10-CM

## 2024-06-11 PROCEDURE — 99999 PR PBB SHADOW E&M-EST. PATIENT-LVL V: CPT | Mod: PBBFAC,,, | Performed by: PODIATRIST

## 2024-06-11 PROCEDURE — 11721 DEBRIDE NAIL 6 OR MORE: CPT | Mod: Q9,59,S$GLB, | Performed by: PODIATRIST

## 2024-06-11 PROCEDURE — 11056 PARNG/CUTG B9 HYPRKR LES 2-4: CPT | Mod: Q9,S$GLB,, | Performed by: PODIATRIST

## 2024-06-11 PROCEDURE — 99499 UNLISTED E&M SERVICE: CPT | Mod: S$GLB,,, | Performed by: PODIATRIST

## 2024-06-11 NOTE — PROGRESS NOTES
Chief Concern: Diabetic Foot Exam (Foot Exam/PCP Liz Roberts MD  01/02/24)      HPI:  Kaylee Romero is a 79 y.o. female presents for foot exam and care.       PCP:  Liz Roberts MD, Diabetic Foot Exam (Foot Exam/PCP Liz Roberts MD  01/02/24)       Patient Active Problem List   Diagnosis    Essential hypertension    Dyslipidemia, goal LDL below 70    Class 3 severe obesity due to excess calories with serious comorbidity and body mass index (BMI) of 40.0 to 44.9 in adult    KAMRAN on CPAP    History of total knee arthroplasty, left    Osteoporosis without current pathological fracture    History of TIA (transient ischemic attack)    Age-related osteoporosis without current pathological fracture    Vitamin D deficiency, unspecified    Type 2 diabetes mellitus with stage 3 chronic kidney disease, without long-term current use of insulin    Coronary 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 pectoris    S/P CABG (coronary artery bypass graft)    Shoulder pain    Chronic heart failure with preserved ejection fraction    Decreased range of motion of right knee    Gait, antalgic    Purpura    Degenerative cervical spinal stenosis    Cervical myelopathy    DDD (degenerative disc disease), cervical    Status post total knee replacement, right           Current Outpatient Medications on File Prior to Visit   Medication Sig Dispense Refill    acetaminophen (TYLENOL) 500 MG tablet Take 500 mg by mouth 2 (two) times daily as needed for Pain.      alendronate (FOSAMAX) 70 MG tablet TAKE 1 TABLET(70 MG) BY MOUTH EVERY 7 DAYS 12 tablet 0    amLODIPine (NORVASC) 10 MG tablet Take 1 tablet (10 mg total) by mouth once daily. 90 tablet 1    ammonium lactate (LAC-HYDRIN) 12 % lotion Apply topically as needed for Dry Skin. On the feet and toenails. 225 g 6    AREXVY, PF, 120 mcg/0.5 mL SusR vaccine       atorvastatin (LIPITOR) 80 MG tablet Take 1 tablet (80 mg total) by mouth  "once daily. 90 tablet 3    BD BOO 2ND GEN PEN NEEDLE 32 gauge x 5/32" Ndle To injection daily 100 each 8    blood sugar diagnostic Strp 1 strip by Misc.(Non-Drug; Combo Route) route once daily. 100 strip 3    blood-glucose meter,continuous (DEXCOM G7 ) Misc To use with sensor 1 each 0    blood-glucose sensor (DEXCOM G7 SENSOR) Areli To change every 10 days 3 each 3    carvediloL (COREG) 25 MG tablet TAKE 1 TABLET(25 MG) BY MOUTH TWICE DAILY WITH MEALS 180 tablet 2    clopidogreL (PLAVIX) 75 mg tablet Take 1 tablet (75 mg total) by mouth once daily. 90 tablet 3    COD LIVER OIL ORAL TAKE 2 CAPSULES BY MOUTH EVERY MORNING AND 1 CAPSULE EVERY EVENING      cyclobenzaprine (FLEXERIL) 10 MG tablet Take 1/2 tablet by mouth three times a day as needed for muscle spasms 90 tablet 1    diclofenac sodium (VOLTAREN) 1 % Gel Apply 2 g topically 4 (four) times daily as needed. To painful area on the feet. (Patient taking differently: Apply 2 g topically 4 (four) times daily as needed. To painful area on the feet and knee) 500 g 5    diphenoxylate-atropine 2.5-0.025 mg (LOMOTIL) 2.5-0.025 mg per tablet TAKE 1 TABLET BY MOUTH FOUR TIMES DAILY AS NEEDED FOR DIARRHEA 30 tablet 0    dulaglutide (TRULICITY) 4.5 mg/0.5 mL pen injector Inject 4.5 mg into the skin every 7 days. 4 pen 37    ferrous sulfate (FEROSUL) 325 mg (65 mg iron) Tab tablet Take 1 tablet (325 mg total) by mouth once daily. 90 tablet 1    furosemide (LASIX) 20 MG tablet TAKE 1 TABLET BY MOUTH ON SATURDAY, SUNDAY, MONDAY, WEDNESDAY AND FRIDAY 30 tablet 3    gabapentin (NEURONTIN) 100 MG capsule Take 1-2 capsules (100-200 mg total) by mouth every evening. 90 capsule 0    glipiZIDE (GLUCOTROL) 5 MG tablet Take 2 tablets (10 mg total) by mouth 2 (two) times daily with meals. 360 tablet 3    glucagon (GVOKE HYPOPEN 2-PACK) 1 mg/0.2 mL AtIn Inject 1 Package into the skin as needed. 2 Syringe 0    hepatitis A and B vaccine, PF, (TWINRIX) 720 ARIEL unit- 20 mcg/mL " "Syrg suspension Inject into the muscle. 1 mL 2    hepatitis A and B vaccine, PF, (TWINRIX) 720 AREIL unit- 20 mcg/mL Syrg suspension Inject into the muscle. 1 mL 0    irbesartan (AVAPRO) 300 MG tablet Take 1 tablet (300 mg total) by mouth every evening. 90 tablet 1    isosorbide mononitrate (IMDUR) 30 MG 24 hr tablet TAKE 2 TABLETS(60 MG) BY MOUTH EVERY  tablet 3    lancets Misc 1 lancet by Misc.(Non-Drug; Combo Route) route once daily. 100 each 3    multivitamin (THERAGRAN) per tablet Take 1 tablet by mouth once daily.      vitamin D (VITAMIN D3) 1000 units Tab Take 1,000 Units by mouth twice a week. Monday AND THURSDAYS ONLY      aspirin (ECOTRIN) 81 MG EC tablet Take 1 tablet (81 mg total) by mouth 2 (two) times a day. 60 tablet 0    insulin glargine,hum.rec.anlog (BASAGLAR KWIKPEN U-100 INSULIN SUBQ) Inject 22 Units into the skin every evening.      nitroGLYCERIN (NITROSTAT) 0.4 MG SL tablet Place 1 tablet (0.4 mg total) under the tongue every 5 (five) minutes as needed for Chest pain. 25 tablet 6     Current Facility-Administered Medications on File Prior to Visit   Medication Dose Route Frequency Provider Last Rate Last Admin    0.9%  NaCl infusion   Intravenous Continuous Doug De Anda MD        hyaluronate sodium, stabilized (MONOVISC) Syrg   Intra-articular 1 time in Clinic/HOD Peterson Sharma MD             Review of patient's allergies indicates:   Allergen Reactions    Keflex [cephalexin] Other (See Comments)     Turns orange    Bactrim [sulfamethoxazole-trimethoprim]      Generic version  Sulfa makes her sick               Objective:        Vitals:    06/11/24 1500   BP: 103/67   Pulse: 92   Weight: 104.8 kg (231 lb 0.7 oz)   Height: 5' 2.5" (1.588 m)         Physical Exam  Constitutional:       Appearance: She is well-developed.   Cardiovascular:      Comments: DP and PT pulses 2/4 loreta.   Edema noted loreta.   Capillary refill time < 3 seconds to digits 1-5, loreta.   Absent hair " "growth      Musculoskeletal:         General: Deformity present. No tenderness. Normal range of motion.      Comments: HAV noted to loreta 1st MPJ. 5/5 strength to all LE muscle groups. No POP noted     Skin:     Capillary Refill: Capillary refill takes 2 to 3 seconds.      Findings: No erythema.      Comments: Toenails x 10 are elongated by 1-3mm, thickened by 1-3mm and mycotic with subungual debris.  Flaky skin on the soles.  No vesicles or redness.      Neurological:      Mental Status: She is alert and oriented to person, place, and time.      Comments: +paresthesias (Abnormal spontaneous sensations in feet)                   Assessment:       Problem List Items Addressed This Visit    None  Visit Diagnoses       Type II diabetes mellitus with neurological manifestations    -  Primary    Relevant Orders    Routine Foot Care    Dermatophytosis of nail        Relevant Orders    Routine Foot Care    Hardwick or callus        Relevant Orders    Routine Foot Care            Plan:         I counseled the patient on the patient's conditions, their implications and medical management.  Shoe inspection.     Maintain proper foot hygiene.   Continue wearing proper shoe gear, daily foot inspections, never walking without protective shoe gear, never putting sharp instruments to feet.    Routine Foot Care    Date/Time: 6/11/2024 3:15 PM    Performed by: Donna Gillis DPM  Authorized by: Donna Gillis DPM    Time out: Immediately prior to procedure a "time out" was called to verify the correct patient, procedure, equipment, support staff and site/side marked as required.    Hyperkeratotic Skin Lesions?: Yes    Number of trimmed lesions:  2  Location(s):  Left 1st Toe and Right 1st Toe    Nail Care Type:  Debride  Location(s): All  (Left 1st Toe, Left 3rd Toe, Left 2nd Toe, Left 4th Toe, Left 5th Toe, Right 1st Toe, Right 2nd Toe, Right 3rd Toe, Right 4th Toe and Right 5th Toe)  Patient tolerance:  Patient tolerated the procedure " well with no immediate complications     With patient's permission, the toenails mentioned above were reduced and debrided using a nail nipper, removing offending nail and debris.   Utilizing a #15 scalpel, I trimmed the corns and calluses at the above mentioned location.      The patient will continue to monitor the areas daily, inspect the feet, wear protective shoe gear when ambulatory, and moisturizer to maintain skin integrity.

## 2024-06-13 ENCOUNTER — OFFICE VISIT (OUTPATIENT)
Dept: CARDIOLOGY | Facility: CLINIC | Age: 79
End: 2024-06-13
Payer: MEDICARE

## 2024-06-13 VITALS
SYSTOLIC BLOOD PRESSURE: 126 MMHG | OXYGEN SATURATION: 100 % | DIASTOLIC BLOOD PRESSURE: 64 MMHG | BODY MASS INDEX: 40.71 KG/M2 | WEIGHT: 229.75 LBS | HEIGHT: 63 IN | HEART RATE: 98 BPM

## 2024-06-13 DIAGNOSIS — E66.01 MORBID OBESITY WITH BODY MASS INDEX (BMI) OF 40.0 OR HIGHER: ICD-10-CM

## 2024-06-13 DIAGNOSIS — N18.31 CHRONIC KIDNEY DISEASE, STAGE 3A: ICD-10-CM

## 2024-06-13 DIAGNOSIS — R07.9 CHEST PAIN, UNSPECIFIED TYPE: Primary | ICD-10-CM

## 2024-06-13 DIAGNOSIS — G47.33 OSA ON CPAP: ICD-10-CM

## 2024-06-13 DIAGNOSIS — Z86.73 HISTORY OF TIA (TRANSIENT ISCHEMIC ATTACK): ICD-10-CM

## 2024-06-13 DIAGNOSIS — E11.22 TYPE 2 DIABETES MELLITUS WITH STAGE 3A CHRONIC KIDNEY DISEASE, WITHOUT LONG-TERM CURRENT USE OF INSULIN: ICD-10-CM

## 2024-06-13 DIAGNOSIS — I10 ESSENTIAL HYPERTENSION: ICD-10-CM

## 2024-06-13 DIAGNOSIS — I35.0 NONRHEUMATIC AORTIC VALVE STENOSIS: ICD-10-CM

## 2024-06-13 DIAGNOSIS — N18.31 TYPE 2 DIABETES MELLITUS WITH STAGE 3A CHRONIC KIDNEY DISEASE, WITHOUT LONG-TERM CURRENT USE OF INSULIN: ICD-10-CM

## 2024-06-13 DIAGNOSIS — I25.810 CORONARY ARTERY DISEASE INVOLVING CORONARY BYPASS GRAFT OF NATIVE HEART WITHOUT ANGINA PECTORIS: ICD-10-CM

## 2024-06-13 DIAGNOSIS — I50.32 CHRONIC HEART FAILURE WITH PRESERVED EJECTION FRACTION: ICD-10-CM

## 2024-06-13 DIAGNOSIS — E78.5 DYSLIPIDEMIA, GOAL LDL BELOW 70: ICD-10-CM

## 2024-06-13 LAB
OHS QRS DURATION: 72 MS
OHS QTC CALCULATION: 444 MS

## 2024-06-13 PROCEDURE — 99999 PR PBB SHADOW E&M-EST. PATIENT-LVL V: CPT | Mod: PBBFAC,,, | Performed by: PHYSICIAN ASSISTANT

## 2024-06-13 PROCEDURE — 3078F DIAST BP <80 MM HG: CPT | Mod: CPTII,S$GLB,, | Performed by: PHYSICIAN ASSISTANT

## 2024-06-13 PROCEDURE — 93000 ELECTROCARDIOGRAM COMPLETE: CPT | Mod: S$GLB,,, | Performed by: INTERNAL MEDICINE

## 2024-06-13 PROCEDURE — 1101F PT FALLS ASSESS-DOCD LE1/YR: CPT | Mod: CPTII,S$GLB,, | Performed by: PHYSICIAN ASSISTANT

## 2024-06-13 PROCEDURE — 3072F LOW RISK FOR RETINOPATHY: CPT | Mod: CPTII,S$GLB,, | Performed by: PHYSICIAN ASSISTANT

## 2024-06-13 PROCEDURE — 3074F SYST BP LT 130 MM HG: CPT | Mod: CPTII,S$GLB,, | Performed by: PHYSICIAN ASSISTANT

## 2024-06-13 PROCEDURE — 3288F FALL RISK ASSESSMENT DOCD: CPT | Mod: CPTII,S$GLB,, | Performed by: PHYSICIAN ASSISTANT

## 2024-06-13 PROCEDURE — 99214 OFFICE O/P EST MOD 30 MIN: CPT | Mod: 25,S$GLB,, | Performed by: PHYSICIAN ASSISTANT

## 2024-06-13 PROCEDURE — 1126F AMNT PAIN NOTED NONE PRSNT: CPT | Mod: CPTII,S$GLB,, | Performed by: PHYSICIAN ASSISTANT

## 2024-06-13 PROCEDURE — 1159F MED LIST DOCD IN RCRD: CPT | Mod: CPTII,S$GLB,, | Performed by: PHYSICIAN ASSISTANT

## 2024-06-13 RX ORDER — ISOSORBIDE MONONITRATE 30 MG/1
30 TABLET, EXTENDED RELEASE ORAL DAILY
Qty: 30 TABLET | Refills: 11 | Status: CANCELLED | OUTPATIENT
Start: 2024-06-13 | End: 2025-06-13

## 2024-06-13 RX ORDER — NITROGLYCERIN 0.4 MG/1
0.4 TABLET SUBLINGUAL EVERY 5 MIN PRN
Qty: 100 TABLET | Refills: 1 | Status: SHIPPED | OUTPATIENT
Start: 2024-06-13 | End: 2025-06-13

## 2024-06-13 NOTE — PROGRESS NOTES
Cardiology Clinic Note  Reason for Visit: Chest pain   General Cardiologist: Dr. Lee     HPI:     PMHx:  CAD s/p 2v CABG (LIMA-LAD and SVG-OM in 2019) with subsequent 90% stenosis of distal LIMA graft s/p PCI of high grade LCx and RCA lesions (10/2019)  HFpEF  HTN  HLD  Mild AS  DM2  CKD III  KAMRAN on CPAP  TIA  Obesity       Kaylee Romero is a 79 y.o. F, who presents with complaint of recent chest pain. She has had a stabbing pain in her chest that has occurred 6 days out of the past 14. It lasts for several minutes at a time and she feels generally unwell during and after. It is not obviously associated with exertion. Last night she had an episode that was resolved with SLNG x1.     ROS:    Pertinent ROS included in HPI and below.  PMH:     Past Medical History:   Diagnosis Date    Age-related osteoporosis without current pathological fracture 2019    Anemia     Cataract     CKD (chronic kidney disease) stage 3, GFR 30-59 ml/min     Coronary artery disease     DDD (degenerative disc disease), cervical 3/27/2024    Dry mouth     History of TIA (transient ischemic attack) 2018    Hyperlipidemia 2014    Hypertension     Hypertensive heart disease with diastolic heart failure 2012    Morbid obesity with body mass index (BMI) of 40.0 or higher 2016    KAMRAN (obstructive sleep apnea)     KAMRAN on CPAP 2016    Osteoporosis without current pathological fracture 2018    Physical deconditioning 2018    Status post total left knee replacement 2017    Type 2 diabetes mellitus with stage 3 chronic kidney disease, without long-term current use of insulin 2019     Past Surgical History:   Procedure Laterality Date    ankle surgery (l) Left     APPENDECTOMY       SECTION      CORONARY ARTERY BYPASS GRAFT  09/2019    x 2    CORONARY ARTERY BYPASS GRAFT (CABG) N/A 2019    Procedure: CORONARY ARTERY BYPASS GRAFT (CABG)X3;  Surgeon: Peterson  Anthony Ventura MD;  [address]  Service: Cardiovascular;  Laterality: N/A;    CORONARY BYPASS GRAFT ANGIOGRAPHY  10/18/2021    Procedure: Bypass graft study;  Surgeon: Jamar Haddad MD;  Location: [facility] CATH LAB;  Service: Cardiology;;    DILATION AND CURETTAGE OF UTERUS      ENDOSCOPIC HARVEST OF VEIN Left 08/12/2019    Procedure: SURGICAL PROCUREMENT, VEIN, ENDOSCOPIC;  Surgeon: Peterson Ventura MD;  Location: [address];  Service: Cardiovascular;  Laterality: Left;  Vein Danese Start: 0843; End: 1054   Vein Prep Start: 1055; End: 1145    JOINT REPLACEMENT Left     knee replacement    KNEE ARTHROSCOPY W/ DEBRIDEMENT      KNEE SURGERY Left 05/01/2017    TKR    LEFT HEART CATHETERIZATION Left 07/09/2019    Procedure: Left heart cath;  Surgeon: Ronak Gibbons MD;  Location: [facility] CATH LAB;  Service: Cardiology;  Laterality: Left;    LEFT HEART CATHETERIZATION Left 10/18/2021    Procedure: Left heart cath;  Surgeon: Jamar Haddad MD;  Location: [facility] CATH LAB;  Service: Cardiology;  Laterality: Left;    TOTAL KNEE ARTHROPLASTY Right 3/9/2023    Procedure: ARTHROPLASTY, KNEE, TOTAL:RIGHT;  Surgeon: Peterson Sharma MD;  Location: [address];  Service: Orthopedics;  Laterality: Right;    TRANSFORAMINAL EPIDURAL INJECTION OF STEROID Left 11/15/2023    Procedure: LUMBAR TRANSFORAMINAL LEFT L2/3 AND L3/4 DIRECT REFERRAL *PLAVIX CLEARANCE IN CHART*;  Surgeon: Frank Ken MD;  Location: [facility] PAIN T;  Service: Pain Management;  Laterality: Left;     Allergies:     Review of patient's allergies indicates:   Allergen Reactions    Bactrim [sulfamethoxazole-trimethoprim] Other (See Comments)     Generic version  Sulfa makes her sick    Keflex [cephalexin] Other (See Comments)     Turns orange     Medications:     Current Outpatient Medications on File Prior to Visit   Medication Sig Dispense Refill    acetaminophen (TYLENOL) 500 MG tablet Take 500 mg by mouth 2 (two) times daily as  "needed for Pain.      alendronate (FOSAMAX) 70 MG tablet TAKE 1 TABLET(70 MG) BY MOUTH EVERY 7 DAYS 12 tablet 0    amLODIPine (NORVASC) 10 MG tablet Take 1 tablet (10 mg total) by mouth once daily. 90 tablet 1    ammonium lactate (LAC-HYDRIN) 12 % lotion Apply topically as needed for Dry Skin. On the feet and toenails. 225 g 6    AREXVY, PF, 120 mcg/0.5 mL SusR vaccine       atorvastatin (LIPITOR) 80 MG tablet Take 1 tablet (80 mg total) by mouth once daily. 90 tablet 3    BD BOO 2ND GEN PEN NEEDLE 32 gauge x 5/32" Ndle To injection daily 100 each 8    blood sugar diagnostic Strp 1 strip by Misc.(Non-Drug; Combo Route) route once daily. 100 strip 3    blood-glucose meter,continuous (DEXCOM G7 ) Misc To use with sensor 1 each 0    blood-glucose sensor (DEXCOM G7 SENSOR) Areli To change every 10 days 3 each 3    carvediloL (COREG) 25 MG tablet TAKE 1 TABLET(25 MG) BY MOUTH TWICE DAILY WITH MEALS 180 tablet 2    clopidogreL (PLAVIX) 75 mg tablet Take 1 tablet (75 mg total) by mouth once daily. 90 tablet 3    COD LIVER OIL ORAL TAKE 2 CAPSULES BY MOUTH EVERY MORNING AND 1 CAPSULE EVERY EVENING      cyclobenzaprine (FLEXERIL) 10 MG tablet Take 1/2 tablet by mouth three times a day as needed for muscle spasms 90 tablet 1    diclofenac sodium (VOLTAREN) 1 % Gel Apply 2 g topically 4 (four) times daily as needed. To painful area on the feet. (Patient taking differently: Apply 2 g topically 4 (four) times daily as needed. To painful area on the feet and knee) 500 g 5    diphenoxylate-atropine 2.5-0.025 mg (LOMOTIL) 2.5-0.025 mg per tablet TAKE 1 TABLET BY MOUTH FOUR TIMES DAILY AS NEEDED FOR DIARRHEA 30 tablet 0    dulaglutide (TRULICITY) 4.5 mg/0.5 mL pen injector Inject 4.5 mg into the skin every 7 days. 4 pen 37    ferrous sulfate (FEROSUL) 325 mg (65 mg iron) Tab tablet Take 1 tablet (325 mg total) by mouth once daily. 90 tablet 1    furosemide (LASIX) 20 MG tablet TAKE 1 TABLET BY MOUTH ON SATURDAY, SUNDAY, " MONDAY, WEDNESDAY AND FRIDAY 30 tablet 3    gabapentin (NEURONTIN) 100 MG capsule Take 1-2 capsules (100-200 mg total) by mouth every evening. 90 capsule 0    glipiZIDE (GLUCOTROL) 5 MG tablet Take 2 tablets (10 mg total) by mouth 2 (two) times daily with meals. 360 tablet 3    glucagon (GVOKE HYPOPEN 2-PACK) 1 mg/0.2 mL AtIn Inject 1 Package into the skin as needed. 2 Syringe 0    hepatitis A and B vaccine, PF, (TWINRIX) 720 ARIEL unit- 20 mcg/mL Syrg suspension Inject into the muscle. 1 mL 2    hepatitis A and B vaccine, PF, (TWINRIX) 720 ARIEL unit- 20 mcg/mL Syrg suspension Inject into the muscle. 1 mL 0    irbesartan (AVAPRO) 300 MG tablet Take 1 tablet (300 mg total) by mouth every evening. 90 tablet 1    isosorbide mononitrate (IMDUR) 30 MG 24 hr tablet TAKE 2 TABLETS(60 MG) BY MOUTH EVERY  tablet 3    lancets Misc 1 lancet by Misc.(Non-Drug; Combo Route) route once daily. 100 each 3    multivitamin (THERAGRAN) per tablet Take 1 tablet by mouth once daily.      vitamin D (VITAMIN D3) 1000 units Tab Take 1,000 Units by mouth twice a week. Monday AND THURSDAYS ONLY      aspirin (ECOTRIN) 81 MG EC tablet Take 1 tablet (81 mg total) by mouth 2 (two) times a day. 60 tablet 0    insulin glargine,hum.rec.anlog (BASAGLAR KWIKPEN U-100 INSULIN SUBQ) Inject 22 Units into the skin every evening.      [DISCONTINUED] nitroGLYCERIN (NITROSTAT) 0.4 MG SL tablet Place 1 tablet (0.4 mg total) under the tongue every 5 (five) minutes as needed for Chest pain. 25 tablet 6     Current Facility-Administered Medications on File Prior to Visit   Medication Dose Route Frequency Provider Last Rate Last Admin    0.9%  NaCl infusion   Intravenous Continuous Doug De Anda MD        hyaluronate sodium, stabilized (MONOVISC) Syrg   Intra-articular 1 time in Clinic/HOD Peterson Sharma MD         Social History:     Social History     Tobacco Use    Smoking status: Never    Smokeless tobacco: Never   Substance Use Topics    Alcohol  "use: Yes     Alcohol/week: 1.0 standard drink of alcohol     Types: 1 Shots of liquor per week     Comment: rare     Family History:     Family History   Problem Relation Name Age of Onset    Heart attack Mother      Heart disease Father      Stroke Father      Heart failure Father      Diabetes Father      Arrhythmia Father      No Known Problems Brother      Stroke Paternal Grandfather      No Known Problems Son      Breast cancer Neg Hx      Colon cancer Neg Hx      Ovarian cancer Neg Hx      Amblyopia Neg Hx      Blindness Neg Hx      Cancer Neg Hx      Cataracts Neg Hx      Glaucoma Neg Hx      Hypertension Neg Hx      Macular degeneration Neg Hx      Retinal detachment Neg Hx      Strabismus Neg Hx      Thyroid disease Neg Hx      Esophageal cancer Neg Hx       Physical Exam:   /64   Pulse 98   Ht 5' 2.5" (1.588 m)   Wt 104.2 kg (229 lb 11.5 oz)   LMP 01/09/2001 (Approximate)   SpO2 100%   BMI 41.35 kg/m²      Physical Exam  Vitals and nursing note reviewed.   Constitutional:       Appearance: Normal appearance.   HENT:      Head: Normocephalic and atraumatic.   Neck:      Vascular: Normal carotid pulses. No carotid bruit or hepatojugular reflux.   Cardiovascular:      Rate and Rhythm: Normal rate and regular rhythm.      Chest Wall: PMI is not displaced.      Pulses:           Radial pulses are 2+ on the right side and 2+ on the left side.        Dorsalis pedis pulses are 2+ on the right side and 2+ on the left side.        Posterior tibial pulses are 2+ on the right side and 2+ on the left side.      Heart sounds: Murmur heard.      Harsh midsystolic murmur is present with a grade of 1/6 at the upper right sternal border radiating to the neck.   Pulmonary:      Effort: Pulmonary effort is normal.      Breath sounds: Normal breath sounds. No wheezing, rhonchi or rales.   Abdominal:      General: Bowel sounds are normal. There is no abdominal bruit.      Palpations: Abdomen is soft. There is no " pulsatile mass.      Tenderness: There is no abdominal tenderness.   Feet:      Right foot:      Skin integrity: Skin integrity normal.      Left foot:      Skin integrity: Skin integrity normal.   Skin:     Capillary Refill: Capillary refill takes less than 2 seconds.   Neurological:      General: No focal deficit present.      Mental Status: She is alert.   Psychiatric:         Mood and Affect: Mood and affect normal.         Speech: Speech normal.         Behavior: Behavior is cooperative.         Thought Content: Thought content normal.          Labs:     Blood Tests:  Lab Results   Component Value Date     (H) 06/20/2022     04/09/2024    K 4.4 04/09/2024     04/09/2024    CO2 24 04/09/2024    BUN 17 04/09/2024    CREATININE 1.2 04/09/2024     (H) 04/09/2024    HGBA1C 7.1 (H) 04/09/2024    MG 1.8 06/21/2022    AST 18 04/09/2024    ALT 18 04/09/2024    ALBUMIN 3.4 (L) 04/09/2024    PROT 7.7 04/09/2024    BILITOT 0.5 04/09/2024    WBC 5.70 01/03/2024    HGB 11.1 (L) 01/03/2024    HCT 34.7 (L) 01/03/2024    HCT 25 (L) 08/12/2019    MCV 87 01/03/2024     01/03/2024    INR 1.1 02/16/2023    TSH 3.398 07/18/2023       Lab Results   Component Value Date    CHOL 103 (L) 12/05/2023    HDL 32 (L) 12/05/2023    TRIG 91 12/05/2023       Lab Results   Component Value Date    LDLCALC 52.8 (L) 12/05/2023         Imaging:     Echocardiogram  TTE 7/22/2022  The left ventricle is normal in size with concentric remodeling and normal systolic function.  The estimated ejection fraction is 68%.  Indeterminate left ventricular diastolic function.  Normal right ventricular size with normal right ventricular systolic function.  There is mild aortic valve stenosis.  Aortic valve area is 2.18 cm2; peak velocity is 2.04 m/s; mean gradient is 11 mmHg.  Intermediate central venous pressure (8 mmHg).  The estimated PA systolic pressure is 27 mmHg.    Stress testing  CPX Study 9/29/2022  Severe functional  impairment associated with a normal breathing reserve, normal oxygen stauration, poor effort. These findings are indicative of functional impairment secondary to poor effort.  The ECG portion of this study is negative for myocardial ischemia.  There was no ST segment deviation noted during stress.  The test was stopped because the patient experienced fatigue.  The patient's exercise capacity was severely impaired.  There were no arrhythmias during stress.    PET Stress Test 2022    There is a very small (< 5%) sized, mid anterior reversible perfusion abnormality involving 3% of LV myocardium in the distribution of the D1.    There are no other significant perfusion abnormalities.    The whole heart absolute myocardial perfusion values averaged 0.61 cc/min/g at rest, which is normal; 0.81 cc/min/g at stress, which is severely reduced; and CFR is 1.34 , which is moderately reduced.    The stress flow is severely reduced diffusely throughout the heart consistent with non- response to vasodilator stress or caffeine.    CT attenuation images demonstrate severe diffuse coronary calcifications in the LAD territory and mild diffuse aortic calcifications.    The gated perfusion images showed an ejection fraction of 77% at rest and 66% during stress. A normal ejection fraction is greater than 53%.    The wall motion is normal at rest.    There is mid anterior wall hypokinesis during stress.    The EKG portion of this study is negative for ischemia.    There were no arrhythmias during stress.    The patient reported chest pain during the stress test.    When compared to the previous study from 2021, there are no significant changes in the perfusion pattern taking into account different pharmacologic stress agents.    Cath Lab  None    Other  None    [id number]  Sinus rhythm w/ 1st degree AV block     2023  Sinus rhythm with 1st degree A-V block with Premature supraventricular   complexes   Voltage criteria  for left ventricular hypertrophy     Assessment:     1. Chest pain, unspecified type    2. Coronary artery disease involving coronary bypass graft of native heart without angina pectoris    3. Chronic heart failure with preserved ejection fraction    4. Nonrheumatic aortic valve stenosis    5. Essential hypertension    6. Dyslipidemia, goal LDL below 70    7. History of TIA (transient ischemic attack)    8. Chronic kidney disease, stage 3a    9. Type 2 diabetes mellitus with stage 3a chronic kidney disease, without long-term current use of insulin    10. Morbid obesity with body mass index (BMI) of 40.0 or higher    11. KAMRAN on CPAP        Plan:     Chest pain   CAD s/p CABG s/p BEA, aortic atherosclerosis  Check PET stress   SL nitro refilled   ER precautions given   Discussed possibly increasing Imdur to 90 mg. Patient would prefer to see how things go and review PET stress results.   Very small (3%) region of ischemia on 2022 PET  Continue DAPT, high intensity statin, beta blocker, ARB, imdur     HFpEF  Class C, NYHA II  Continue beta blocker, ARB, lasix  SGLT2i may be beneficial, however she takes many medications and we will only add more if needed     Mild aortic stenosis  Echo every 3-5 yrs, next in 2025     Essential hypertension  BP at goal today     Dyslipidemia, goal LDL below 70  LDL at goal  Last FLP 12/5/2023: LDL 52.8  Continue statin     History of TIA (transient ischemic attack)  Continue secondary prevention measures as above     Stage 3a chronic kidney disease  Stable     Type 2 diabetes mellitus with stage 3 chronic kidney disease and hypertension  Followed by PCP     Morbid obesity with body mass index (BMI) of 40.0 or higher  Discussed diet/lifestyle modification     KAMRAN on CPAP  Continue CPAP        Has an appt with Dr. Lee in Oct       Signed:  Elvia Lin PA-C  Cardiology     6/13/2024 10:18 AM    Follow-up:     Future Appointments   Date Time Provider Department Center   6/21/2024  11:00 AM [facility] LAB [facility]   6/21/2024 11:15 AM SPECIMAN [facility] SPECLAB [facility]   6/26/2024  1:30 PM Chinyere Cuello MD [facility] NEPHRO [facility]   7/5/2024 10:30 AM Liz Roberts MD [facility] PRICARE [facility]   8/2/2024  2:30 PM CARDIAC, PET IMAGING [facility] CARDPET [facility]   8/9/2024  8:00 AM [facility] LAB [facility]   8/16/2024  1:30 PM Abbey Fox NP [facility] ENDODIA [facility]   9/10/2024  3:00 PM Donna Gillis DPM [facility]   10/30/2024  4:30 PM Bello Lee III, MD [facility] CARDIO [facility]

## 2024-06-18 ENCOUNTER — TELEPHONE (OUTPATIENT)
Dept: GASTROENTEROLOGY | Facility: HOSPITAL | Age: 79
End: 2024-06-18
Payer: MEDICARE

## 2024-06-18 DIAGNOSIS — K59.1 FUNCTIONAL DIARRHEA: ICD-10-CM

## 2024-06-18 RX ORDER — DIPHENOXYLATE HYDROCHLORIDE AND ATROPINE SULFATE 2.5; .025 MG/1; MG/1
1 TABLET ORAL 4 TIMES DAILY PRN
Qty: 30 TABLET | Refills: 3 | Status: SHIPPED | OUTPATIENT
Start: 2024-06-18

## 2024-06-18 NOTE — TELEPHONE ENCOUNTER
----- Message from Marlee Espinoza DO sent at 6/18/2024 12:02 PM CDT -----  Regarding: Prescription  Hey Dr. ISRAEL,    This lady was seen in clinic by me and you a while ago. She has been having trouble getting her diphenoxylate/atropine and for some reason I can't prescribe it.     Can you help or direct me?     Thanks,    Mo

## 2024-06-21 ENCOUNTER — LAB VISIT (OUTPATIENT)
Dept: LAB | Facility: HOSPITAL | Age: 79
End: 2024-06-21
Attending: INTERNAL MEDICINE
Payer: MEDICARE

## 2024-06-21 DIAGNOSIS — D63.1 ANEMIA IN STAGE 3A CHRONIC KIDNEY DISEASE: ICD-10-CM

## 2024-06-21 DIAGNOSIS — N18.31 ANEMIA IN STAGE 3A CHRONIC KIDNEY DISEASE: ICD-10-CM

## 2024-06-21 DIAGNOSIS — N18.31 STAGE 3A CHRONIC KIDNEY DISEASE: ICD-10-CM

## 2024-06-21 LAB
25(OH)D3+25(OH)D2 SERPL-MCNC: 47 NG/ML (ref 30–96)
ALBUMIN SERPL BCP-MCNC: 3.4 G/DL (ref 3.5–5.2)
ALP SERPL-CCNC: 65 U/L (ref 55–135)
ALT SERPL W/O P-5'-P-CCNC: 13 U/L (ref 10–44)
ANION GAP SERPL CALC-SCNC: 8 MMOL/L (ref 8–16)
AST SERPL-CCNC: 19 U/L (ref 10–40)
BASOPHILS # BLD AUTO: 0.03 K/UL (ref 0–0.2)
BASOPHILS NFR BLD: 0.6 % (ref 0–1.9)
BILIRUB SERPL-MCNC: 0.4 MG/DL (ref 0.1–1)
BUN SERPL-MCNC: 17 MG/DL (ref 8–23)
CALCIUM SERPL-MCNC: 9.8 MG/DL (ref 8.7–10.5)
CHLORIDE SERPL-SCNC: 103 MMOL/L (ref 95–110)
CO2 SERPL-SCNC: 24 MMOL/L (ref 23–29)
CREAT SERPL-MCNC: 1.2 MG/DL (ref 0.5–1.4)
DIFFERENTIAL METHOD BLD: ABNORMAL
EOSINOPHIL # BLD AUTO: 0.2 K/UL (ref 0–0.5)
EOSINOPHIL NFR BLD: 4.2 % (ref 0–8)
ERYTHROCYTE [DISTWIDTH] IN BLOOD BY AUTOMATED COUNT: 15.1 % (ref 11.5–14.5)
EST. GFR  (NO RACE VARIABLE): 46.6 ML/MIN/1.73 M^2
FERRITIN SERPL-MCNC: 340 NG/ML (ref 20–300)
GLUCOSE SERPL-MCNC: 105 MG/DL (ref 70–110)
HCT VFR BLD AUTO: 30.4 % (ref 37–48.5)
HGB BLD-MCNC: 9.7 G/DL (ref 12–16)
IMM GRANULOCYTES # BLD AUTO: 0.02 K/UL (ref 0–0.04)
IMM GRANULOCYTES NFR BLD AUTO: 0.4 % (ref 0–0.5)
IRON SERPL-MCNC: 42 UG/DL (ref 30–160)
LYMPHOCYTES # BLD AUTO: 1 K/UL (ref 1–4.8)
LYMPHOCYTES NFR BLD: 19.9 % (ref 18–48)
MCH RBC QN AUTO: 27.7 PG (ref 27–31)
MCHC RBC AUTO-ENTMCNC: 31.9 G/DL (ref 32–36)
MCV RBC AUTO: 87 FL (ref 82–98)
MONOCYTES # BLD AUTO: 0.4 K/UL (ref 0.3–1)
MONOCYTES NFR BLD: 8.9 % (ref 4–15)
NEUTROPHILS # BLD AUTO: 3.3 K/UL (ref 1.8–7.7)
NEUTROPHILS NFR BLD: 66 % (ref 38–73)
NRBC BLD-RTO: 0 /100 WBC
PHOSPHATE SERPL-MCNC: 3.7 MG/DL (ref 2.7–4.5)
PLATELET # BLD AUTO: 212 K/UL (ref 150–450)
PMV BLD AUTO: 11.4 FL (ref 9.2–12.9)
POTASSIUM SERPL-SCNC: 4.1 MMOL/L (ref 3.5–5.1)
PROT SERPL-MCNC: 7.8 G/DL (ref 6–8.4)
PTH-INTACT SERPL-MCNC: 55.4 PG/ML (ref 9–77)
RBC # BLD AUTO: 3.5 M/UL (ref 4–5.4)
SATURATED IRON: 15 % (ref 20–50)
SODIUM SERPL-SCNC: 135 MMOL/L (ref 136–145)
TOTAL IRON BINDING CAPACITY: 281 UG/DL (ref 250–450)
TRANSFERRIN SERPL-MCNC: 190 MG/DL (ref 200–375)
WBC # BLD AUTO: 4.97 K/UL (ref 3.9–12.7)

## 2024-06-21 PROCEDURE — 84100 ASSAY OF PHOSPHORUS: CPT | Performed by: INTERNAL MEDICINE

## 2024-06-21 PROCEDURE — 82306 VITAMIN D 25 HYDROXY: CPT | Performed by: INTERNAL MEDICINE

## 2024-06-21 PROCEDURE — 85025 COMPLETE CBC W/AUTO DIFF WBC: CPT | Performed by: INTERNAL MEDICINE

## 2024-06-21 PROCEDURE — 80053 COMPREHEN METABOLIC PANEL: CPT | Performed by: INTERNAL MEDICINE

## 2024-06-21 PROCEDURE — 36415 COLL VENOUS BLD VENIPUNCTURE: CPT | Mod: PN | Performed by: INTERNAL MEDICINE

## 2024-06-21 PROCEDURE — 83970 ASSAY OF PARATHORMONE: CPT | Performed by: INTERNAL MEDICINE

## 2024-06-21 PROCEDURE — 82728 ASSAY OF FERRITIN: CPT | Performed by: INTERNAL MEDICINE

## 2024-06-21 PROCEDURE — 83540 ASSAY OF IRON: CPT | Performed by: INTERNAL MEDICINE

## 2024-06-25 NOTE — PROGRESS NOTES
HPI  This 79 y.o. y/o female with PMH of HTN, HLD, type 2 DM, HFpEF, morbid obesity, KAMRAN, CAD s/p CABG in 09/2019, CKD came in for CKD.    Renal history  Creatinine   Date Value Ref Range Status   06/21/2024 1.2 0.5 - 1.4 mg/dL Final   04/09/2024 1.2 0.5 - 1.4 mg/dL Final   03/05/2024 1.4 0.5 - 1.4 mg/dL Final   01/24/2024 1.2 0.5 - 1.4 mg/dL Final   01/24/2024 1.2 0.5 - 1.4 mg/dL Final   01/03/2024 1.4 0.5 - 1.4 mg/dL Final   12/05/2023 0.9 0.5 - 1.4 mg/dL Final   09/19/2023 1.0 0.5 - 1.4 mg/dL Final   08/23/2023 1.2 0.5 - 1.4 mg/dL Final   07/18/2023 1.3 0.5 - 1.4 mg/dL Final   Baseline Cr 1.0-1.4 since 2019  Cr up to 2.4 in 2021, reason unknown, then down to 1.0-1.4  DOREEN Cr up to 2.0 christin CABG in 08/2019  Prot/Creat Ratio, Urine   Date Value Ref Range Status   06/21/2024 Unable to calculate 0.00 - 0.20 Final   03/05/2024 0.10 0.00 - 0.20 Final     Has been drinking 45-50 oz of fluid a day  Not taking NSAIDs    HTN  BP this visit 137/72 mmHg  BP at home 110s  Current medication: amlodipine 10 mg, carvedilol 25 mg BID, irbesartan 300 mg daily, imdur 30 mg daily, lasix 20 mg every other day  Has been compliant with low salt diet    Type 2 DM  Lab Results   Component Value Date    HGBA1C 7.1 (H) 04/09/2024   Still on Trulocity once a week  Not on DM meds after started high protein diet    [facility] 04/2024  FINDINGS:  Right kidney: The right kidney measures 10.3 cm.  Mild cortical thinning.  Mild loss of corticomedullary distinction  Resistive index measures 0.8.  Normal perfusion.  No solid mass. No renal stone. No hydronephrosis.     Left kidney: The left kidney measures 10.4 cm.  Mild cortical thinning. Mild loss of corticomedullary distinction  Resistive index measures 0.79.  Normal perfusion. 4 mm nonobstructing left renal stone.  No solid mass.  No hydronephrosis.     The bladder is partially distended without significant abnormality.     Splenic resistive index: 0.8.     Impression:     Bilateral medical renal  disease.     Nonobstructing left renal stone.       Past Medical History:   Diagnosis Date    Age-related osteoporosis without current pathological fracture 2019    Anemia     Cataract     CKD (chronic kidney disease) stage 3, GFR 30-59 ml/min     Coronary artery disease     DDD (degenerative disc disease), cervical 3/27/2024    Dry mouth     History of TIA (transient ischemic attack) 2018    Hyperlipidemia 2014    Hypertension     Hypertensive heart disease with diastolic heart failure 2012    Morbid obesity with body mass index (BMI) of 40.0 or higher 2016    KAMRAN (obstructive sleep apnea)     KAMRAN on CPAP 2016    Osteoporosis without current pathological fracture 2018    Physical deconditioning 2018    Status post total left knee replacement 2017    Type 2 diabetes mellitus with stage 3 chronic kidney disease, without long-term current use of insulin 2019       Past Surgical History:   Procedure Laterality Date    ankle surgery (l) Left     APPENDECTOMY       SECTION      CORONARY ARTERY BYPASS GRAFT  09/2019    x 2    CORONARY ARTERY BYPASS GRAFT (CABG) N/A 2019    Procedure: CORONARY ARTERY BYPASS GRAFT (CABG)X3;  Surgeon: Peterson Ventura MD;  Location: [facility] OR [address] [place] [address];  Service: Cardiovascular;  Laterality: N/A;    CORONARY BYPASS GRAFT ANGIOGRAPHY  10/18/2021    Procedure: Bypass graft study;  Surgeon: Jamar Haddad MD;  Location: [facility] CATH LAB;  Service: Cardiology;;    DILATION AND CURETTAGE OF UTERUS      ENDOSCOPIC HARVEST OF VEIN Left 2019    Procedure: SURGICAL PROCUREMENT, VEIN, ENDOSCOPIC;  Surgeon: Peterson Ventura MD;  Location: [facility] OR [address] [place] [address];  Service: Cardiovascular;  Laterality: Left;  Vein [place] Start: 843; End: 105   Vein Prep Start: 1055; End: 1145    JOINT REPLACEMENT Left     knee replacement    KNEE ARTHROSCOPY W/ DEBRIDEMENT      KNEE SURGERY Left 2017    TKR    LEFT HEART  CATHETERIZATION Left 07/09/2019    Procedure: Left heart cath;  Surgeon: Ronak Gibbons MD;  Location: [facility] CATH LAB;  Service: Cardiology;  Laterality: Left;    LEFT HEART CATHETERIZATION Left 10/18/2021    Procedure: Left heart cath;  Surgeon: Jamar Haddad MD;  Location: [facility] CATH LAB;  Service: Cardiology;  Laterality: Left;    TOTAL KNEE ARTHROPLASTY Right 3/9/2023    Procedure: ARTHROPLASTY, KNEE, TOTAL:RIGHT;  Surgeon: Peterson Sharma MD;  Location: [facility] OR [facility] FLR;  Service: Orthopedics;  Laterality: Right;    TRANSFORAMINAL EPIDURAL INJECTION OF STEROID Left 11/15/2023    Procedure: LUMBAR TRANSFORAMINAL LEFT L2/3 AND L3/4 DIRECT REFERRAL *PLAVIX CLEARANCE IN CHART*;  Surgeon: Frank Ken MD;  Location: [facility] PAIN MGT;  Service: Pain Management;  Laterality: Left;       Review of patient's allergies indicates:   Allergen Reactions    Bactrim [sulfamethoxazole-trimethoprim] Other (See Comments)     Generic version  Sulfa makes her sick    Keflex [cephalexin] Other (See Comments)     Turns orange         Social History     Socioeconomic History    Marital status:     Number of children: 1   Tobacco Use    Smoking status: Never    Smokeless tobacco: Never   Substance and Sexual Activity    Alcohol use: Yes     Alcohol/week: 1.0 standard drink of alcohol     Types: 1 Shots of liquor per week     Comment: rare    Drug use: No    Sexual activity: Yes     Partners: Male     Birth control/protection: Post-menopausal   Social History Narrative     with a son living locally.  at [employer], Retired 5/18.     Social Determinants of Health     Financial Resource Strain: Low Risk  (1/9/2024)    Overall Financial Resource Strain (CARDIA)     Difficulty of Paying Living Expenses: Not hard at all   Food Insecurity: No Food Insecurity (1/9/2024)    Hunger Vital Sign     Worried About Running Out of Food in the Last Year: Never true     Ran Out of Food in the Last  Year: Never true   Transportation Needs: No Transportation Needs (1/9/2024)    PRAPARE - Transportation     Lack of Transportation (Medical): No     Lack of Transportation (Non-Medical): No   Physical Activity: Sufficiently Active (1/9/2024)    Exercise Vital Sign     Days of Exercise per Week: 4 days     Minutes of Exercise per Session: 60 min   Stress: No Stress Concern Present (1/9/2024)    [place] [employer] - Occupational Stress Questionnaire     Feeling of Stress : Not at all   Housing Stability: Low Risk  (1/9/2024)    Housing Stability Vital Sign     Unable to Pay for Housing in the Last Year: No     Number of Places Lived in the Last Year: 1     Unstable Housing in the Last Year: No       Family History   Problem Relation Name Age of Onset    Heart attack Mother      Heart disease Father      Stroke Father      Heart failure Father      Diabetes Father      Arrhythmia Father      No Known Problems Brother      Stroke Paternal Grandfather      No Known Problems Son      Breast cancer Neg Hx      Colon cancer Neg Hx      Ovarian cancer Neg Hx      Amblyopia Neg Hx      Blindness Neg Hx      Cancer Neg Hx      Cataracts Neg Hx      Glaucoma Neg Hx      Hypertension Neg Hx      Macular degeneration Neg Hx      Retinal detachment Neg Hx      Strabismus Neg Hx      Thyroid disease Neg Hx      Esophageal cancer Neg Hx         ROS negative except listed above    PHYSICAL EXAMINATION:  Blood pressure 137/72, pulse 97, weight 102.4 kg (225 lb 12 oz), last menstrual period 01/09/2001, SpO2 100%.  Constitutional - No acute distress  HEENT - Grossly normal  Neck - supple.   Cardiovascular - JVP 8 cm  Respiratory - Bilateral coarse breathing sound  Musculoskeletal - Peripheral edema trace  Dermatologic/Skin - Skin warm and dry.  No rashes.    Neurologic - No acute neurological deficit  Psychiatric - AAOx3    Assessment and Plan  1. CKD Stage 3A:     Urine Protein Creatinine Ratios:    Prot/Creat  Ratio, Urine   Date Value Ref Range Status   06/21/2024 Unable to calculate 0.00 - 0.20 Final   03/05/2024 0.10 0.00 - 0.20 Final   09/19/2023 Unable to calculate 0.00 - 0.20 Final         Acid-Base:   Lab Results   Component Value Date     (L) 06/21/2024    K 4.1 06/21/2024    CO2 24 06/21/2024     -Counseled to avoid NSAIDs  -Counseled to drink 45 oz a day  -Will continue Trulicity; will consider adding SGLT2 if UPCR > 0.3g noted    2. HTN: BP goal 130/80 mmHg  -Counseled for low salt diet  -Continue amlodipine 10 mg, carvedilol 25 mg BID, irbesartan 300 mg daily, imdur 30 mg daily, lasix 20 mg every other day    3. Bone mineral condition:   Lab Results   Component Value Date    PTH 55.4 06/21/2024    PTH 53.1 01/06/2022    CALCIUM 9.8 06/21/2024    CALCIUM 9.9 04/09/2024    PHOS 3.7 06/21/2024    PHOS 3.3 01/24/2024     Vit D, 25-Hydroxy   Date Value Ref Range Status   06/21/2024 47 30 - 96 ng/mL Final     Comment:     Vitamin D deficiency.........<10 ng/mL                              Vitamin D insufficiency......10-29 ng/mL       Vitamin D sufficiency........> or equal to 30 ng/mL  Vitamin D toxicity............>100 ng/mL        Will continue to monitor    4. Anemia:   Lab Results   Component Value Date    HGB 9.7 (L) 06/21/2024    FERRITIN 340 (H) 06/21/2024    UIBC 227 08/23/2005    IRON 42 06/21/2024    TRANSFERRIN 190 (L) 06/21/2024    TIBC 281 06/21/2024    FESATURATED 15 (L) 06/21/2024   Continue Ferrous 325 mg daily  Advised to discuss with the PCP regarding colonoscopy    5. Type 2 DM:  Last HbA1C   Lab Results   Component Value Date    HGBA1C 7.1 (H) 04/09/2024     Counseled the patient regarding the importance of sugar control to slow CKD progression     6. Lipid management:   Lab Results   Component Value Date    LDLCALC 52.8 (L) 12/05/2023   Continue statin.    7. HFpEF  Counseled the patient to watch and keep the weight stable as unstable fluid status can affect the kidney function.    8.  Severe obesity  Counseled for weight loss since the weight is associated with CKD progression    ESRD planing when eGFR < 15     Follow up in 6 months

## 2024-06-26 ENCOUNTER — OFFICE VISIT (OUTPATIENT)
Dept: NEPHROLOGY | Facility: CLINIC | Age: 79
End: 2024-06-26
Payer: MEDICARE

## 2024-06-26 VITALS
HEART RATE: 97 BPM | BODY MASS INDEX: 40.63 KG/M2 | WEIGHT: 225.75 LBS | OXYGEN SATURATION: 100 % | DIASTOLIC BLOOD PRESSURE: 72 MMHG | SYSTOLIC BLOOD PRESSURE: 137 MMHG

## 2024-06-26 DIAGNOSIS — N18.31 ANEMIA IN STAGE 3A CHRONIC KIDNEY DISEASE: ICD-10-CM

## 2024-06-26 DIAGNOSIS — N18.31 STAGE 3A CHRONIC KIDNEY DISEASE: Primary | ICD-10-CM

## 2024-06-26 DIAGNOSIS — E11.22 TYPE 2 DIABETES MELLITUS WITH STAGE 3A CHRONIC KIDNEY DISEASE, UNSPECIFIED WHETHER LONG TERM INSULIN USE: ICD-10-CM

## 2024-06-26 DIAGNOSIS — D63.1 ANEMIA IN STAGE 3A CHRONIC KIDNEY DISEASE: ICD-10-CM

## 2024-06-26 DIAGNOSIS — I10 HYPERTENSION, UNSPECIFIED TYPE: ICD-10-CM

## 2024-06-26 DIAGNOSIS — N18.31 TYPE 2 DIABETES MELLITUS WITH STAGE 3A CHRONIC KIDNEY DISEASE, UNSPECIFIED WHETHER LONG TERM INSULIN USE: ICD-10-CM

## 2024-06-26 DIAGNOSIS — I50.9 CONGESTIVE HEART FAILURE, UNSPECIFIED HF CHRONICITY, UNSPECIFIED HEART FAILURE TYPE: ICD-10-CM

## 2024-06-26 DIAGNOSIS — E78.5 HYPERLIPIDEMIA ASSOCIATED WITH TYPE 2 DIABETES MELLITUS: ICD-10-CM

## 2024-06-26 DIAGNOSIS — E66.01 SEVERE OBESITY (BMI >= 40): ICD-10-CM

## 2024-06-26 DIAGNOSIS — N20.0 KIDNEY STONE: ICD-10-CM

## 2024-06-26 DIAGNOSIS — E11.69 HYPERLIPIDEMIA ASSOCIATED WITH TYPE 2 DIABETES MELLITUS: ICD-10-CM

## 2024-06-26 PROCEDURE — 1126F AMNT PAIN NOTED NONE PRSNT: CPT | Mod: CPTII,S$GLB,, | Performed by: INTERNAL MEDICINE

## 2024-06-26 PROCEDURE — 3072F LOW RISK FOR RETINOPATHY: CPT | Mod: CPTII,S$GLB,, | Performed by: INTERNAL MEDICINE

## 2024-06-26 PROCEDURE — 99214 OFFICE O/P EST MOD 30 MIN: CPT | Mod: S$GLB,,, | Performed by: INTERNAL MEDICINE

## 2024-06-26 PROCEDURE — 3288F FALL RISK ASSESSMENT DOCD: CPT | Mod: CPTII,S$GLB,, | Performed by: INTERNAL MEDICINE

## 2024-06-26 PROCEDURE — 3075F SYST BP GE 130 - 139MM HG: CPT | Mod: CPTII,S$GLB,, | Performed by: INTERNAL MEDICINE

## 2024-06-26 PROCEDURE — 99999 PR PBB SHADOW E&M-EST. PATIENT-LVL V: CPT | Mod: PBBFAC,,, | Performed by: INTERNAL MEDICINE

## 2024-06-26 PROCEDURE — 3078F DIAST BP <80 MM HG: CPT | Mod: CPTII,S$GLB,, | Performed by: INTERNAL MEDICINE

## 2024-06-26 PROCEDURE — 1160F RVW MEDS BY RX/DR IN RCRD: CPT | Mod: CPTII,S$GLB,, | Performed by: INTERNAL MEDICINE

## 2024-06-26 PROCEDURE — 1101F PT FALLS ASSESS-DOCD LE1/YR: CPT | Mod: CPTII,S$GLB,, | Performed by: INTERNAL MEDICINE

## 2024-06-26 PROCEDURE — 1159F MED LIST DOCD IN RCRD: CPT | Mod: CPTII,S$GLB,, | Performed by: INTERNAL MEDICINE

## 2024-06-26 RX ORDER — FUROSEMIDE 20 MG/1
TABLET ORAL
Qty: 90 TABLET | Refills: 3 | Status: SHIPPED | OUTPATIENT
Start: 2024-06-26

## 2024-07-03 ENCOUNTER — TELEPHONE (OUTPATIENT)
Dept: ENDOCRINOLOGY | Facility: CLINIC | Age: 79
End: 2024-07-03
Payer: MEDICARE

## 2024-07-08 ENCOUNTER — PATIENT MESSAGE (OUTPATIENT)
Dept: CARDIOLOGY | Facility: CLINIC | Age: 79
End: 2024-07-08
Payer: MEDICARE

## 2024-07-09 RX ORDER — ISOSORBIDE MONONITRATE 30 MG/1
60 TABLET, EXTENDED RELEASE ORAL DAILY
Qty: 180 TABLET | Refills: 3 | Status: SHIPPED | OUTPATIENT
Start: 2024-07-09 | End: 2025-07-09

## 2024-07-11 ENCOUNTER — LAB VISIT (OUTPATIENT)
Dept: LAB | Facility: HOSPITAL | Age: 79
End: 2024-07-11
Attending: INTERNAL MEDICINE
Payer: MEDICARE

## 2024-07-11 ENCOUNTER — OFFICE VISIT (OUTPATIENT)
Dept: PRIMARY CARE CLINIC | Facility: CLINIC | Age: 79
End: 2024-07-11
Payer: MEDICARE

## 2024-07-11 VITALS
OXYGEN SATURATION: 94 % | HEIGHT: 63 IN | SYSTOLIC BLOOD PRESSURE: 110 MMHG | WEIGHT: 232.13 LBS | TEMPERATURE: 98 F | BODY MASS INDEX: 41.13 KG/M2 | DIASTOLIC BLOOD PRESSURE: 58 MMHG

## 2024-07-11 DIAGNOSIS — R20.8 OTHER DISTURBANCES OF SKIN SENSATION: ICD-10-CM

## 2024-07-11 DIAGNOSIS — I11.0 HYPERTENSIVE HEART DISEASE WITH CHRONIC DIASTOLIC CONGESTIVE HEART FAILURE: Primary | ICD-10-CM

## 2024-07-11 DIAGNOSIS — N18.31 TYPE 2 DIABETES MELLITUS WITH STAGE 3A CHRONIC KIDNEY DISEASE, WITHOUT LONG-TERM CURRENT USE OF INSULIN: ICD-10-CM

## 2024-07-11 DIAGNOSIS — I50.32 HYPERTENSIVE HEART DISEASE WITH CHRONIC DIASTOLIC CONGESTIVE HEART FAILURE: Primary | ICD-10-CM

## 2024-07-11 DIAGNOSIS — G63 POLYNEUROPATHY ASSOCIATED WITH UNDERLYING DISEASE: ICD-10-CM

## 2024-07-11 DIAGNOSIS — M81.0 OSTEOPOROSIS WITHOUT CURRENT PATHOLOGICAL FRACTURE, UNSPECIFIED OSTEOPOROSIS TYPE: ICD-10-CM

## 2024-07-11 DIAGNOSIS — M47.816 LUMBAR SPONDYLOSIS: ICD-10-CM

## 2024-07-11 DIAGNOSIS — M48.02 DEGENERATIVE CERVICAL SPINAL STENOSIS: ICD-10-CM

## 2024-07-11 DIAGNOSIS — D64.9 NORMOCYTIC ANEMIA: ICD-10-CM

## 2024-07-11 DIAGNOSIS — I25.118 CORONARY ARTERY DISEASE OF NATIVE ARTERY OF NATIVE HEART WITH STABLE ANGINA PECTORIS: ICD-10-CM

## 2024-07-11 DIAGNOSIS — Z12.31 ENCOUNTER FOR SCREENING MAMMOGRAM FOR BREAST CANCER: ICD-10-CM

## 2024-07-11 DIAGNOSIS — E11.22 TYPE 2 DIABETES MELLITUS WITH STAGE 3A CHRONIC KIDNEY DISEASE, WITHOUT LONG-TERM CURRENT USE OF INSULIN: ICD-10-CM

## 2024-07-11 LAB
ERYTHROCYTE [DISTWIDTH] IN BLOOD BY AUTOMATED COUNT: 15.5 % (ref 11.5–14.5)
FOLATE SERPL-MCNC: 16.8 NG/ML (ref 4–24)
HCT VFR BLD AUTO: 32 % (ref 37–48.5)
HGB BLD-MCNC: 10.2 G/DL (ref 12–16)
MCH RBC QN AUTO: 27.4 PG (ref 27–31)
MCHC RBC AUTO-ENTMCNC: 31.9 G/DL (ref 32–36)
MCV RBC AUTO: 86 FL (ref 82–98)
PLATELET # BLD AUTO: 208 K/UL (ref 150–450)
PMV BLD AUTO: 11 FL (ref 9.2–12.9)
RBC # BLD AUTO: 3.72 M/UL (ref 4–5.4)
TSH SERPL DL<=0.005 MIU/L-ACNC: 2.8 UIU/ML (ref 0.4–4)
VIT B12 SERPL-MCNC: 1397 PG/ML (ref 210–950)
WBC # BLD AUTO: 5.24 K/UL (ref 3.9–12.7)

## 2024-07-11 PROCEDURE — 84443 ASSAY THYROID STIM HORMONE: CPT | Performed by: INTERNAL MEDICINE

## 2024-07-11 PROCEDURE — 85027 COMPLETE CBC AUTOMATED: CPT | Performed by: INTERNAL MEDICINE

## 2024-07-11 PROCEDURE — 1126F AMNT PAIN NOTED NONE PRSNT: CPT | Mod: CPTII,S$GLB,, | Performed by: INTERNAL MEDICINE

## 2024-07-11 PROCEDURE — 82607 VITAMIN B-12: CPT | Performed by: INTERNAL MEDICINE

## 2024-07-11 PROCEDURE — 3074F SYST BP LT 130 MM HG: CPT | Mod: CPTII,S$GLB,, | Performed by: INTERNAL MEDICINE

## 2024-07-11 PROCEDURE — 1100F PTFALLS ASSESS-DOCD GE2>/YR: CPT | Mod: CPTII,S$GLB,, | Performed by: INTERNAL MEDICINE

## 2024-07-11 PROCEDURE — 3078F DIAST BP <80 MM HG: CPT | Mod: CPTII,S$GLB,, | Performed by: INTERNAL MEDICINE

## 2024-07-11 PROCEDURE — 82746 ASSAY OF FOLIC ACID SERUM: CPT | Performed by: INTERNAL MEDICINE

## 2024-07-11 PROCEDURE — 84425 ASSAY OF VITAMIN B-1: CPT | Performed by: INTERNAL MEDICINE

## 2024-07-11 PROCEDURE — 36415 COLL VENOUS BLD VENIPUNCTURE: CPT | Mod: PN | Performed by: INTERNAL MEDICINE

## 2024-07-11 PROCEDURE — 1160F RVW MEDS BY RX/DR IN RCRD: CPT | Mod: CPTII,S$GLB,, | Performed by: INTERNAL MEDICINE

## 2024-07-11 PROCEDURE — 3072F LOW RISK FOR RETINOPATHY: CPT | Mod: CPTII,S$GLB,, | Performed by: INTERNAL MEDICINE

## 2024-07-11 PROCEDURE — 99214 OFFICE O/P EST MOD 30 MIN: CPT | Mod: S$GLB,,, | Performed by: INTERNAL MEDICINE

## 2024-07-11 PROCEDURE — 1159F MED LIST DOCD IN RCRD: CPT | Mod: CPTII,S$GLB,, | Performed by: INTERNAL MEDICINE

## 2024-07-11 PROCEDURE — 99999 PR PBB SHADOW E&M-EST. PATIENT-LVL V: CPT | Mod: PBBFAC,,, | Performed by: INTERNAL MEDICINE

## 2024-07-11 PROCEDURE — 3288F FALL RISK ASSESSMENT DOCD: CPT | Mod: CPTII,S$GLB,, | Performed by: INTERNAL MEDICINE

## 2024-07-11 NOTE — LETTER
2024    Kaylee Romero  [id number]  [address]  [facility] [address]             [facility]  [address]  [facility] [address]  Phone: [phone]  Fax: [phone] To Whom It May Concern    RE: Kaylee Romero         : 49    Mrs Romero is medically stable and cleared for massage therapy, without risk for serious adverse effects. She may participate at will, noting that she is on daily aspirin and typically avoids deep tissue massage.       Sincerely,       Liz Weber MD  [facility]
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
## 2024-07-12 ENCOUNTER — PATIENT MESSAGE (OUTPATIENT)
Dept: PRIMARY CARE CLINIC | Facility: CLINIC | Age: 79
End: 2024-07-12
Payer: MEDICARE

## 2024-07-12 NOTE — TELEPHONE ENCOUNTER
LOV 7/11/24  Annual 1/2/24   RTC     Patient would like to know the four reasons/sources of nerve pain mentioned during her visit on 7/11/24.

## 2024-07-14 RX ORDER — AMLODIPINE BESYLATE 10 MG/1
10 TABLET ORAL DAILY
Qty: 90 TABLET | Refills: 2 | Status: SHIPPED | OUTPATIENT
Start: 2024-07-14

## 2024-07-14 RX ORDER — GABAPENTIN 100 MG/1
100-200 CAPSULE ORAL NIGHTLY
Qty: 90 CAPSULE | Refills: 2 | Status: SHIPPED | OUTPATIENT
Start: 2024-07-14

## 2024-07-14 RX ORDER — ALENDRONATE SODIUM 70 MG/1
70 TABLET ORAL
Qty: 12 TABLET | Refills: 2 | Status: SHIPPED | OUTPATIENT
Start: 2024-07-14

## 2024-07-14 RX ORDER — FERROUS SULFATE 325(65) MG
325 TABLET ORAL DAILY
Qty: 90 TABLET | Refills: 1 | Status: SHIPPED | OUTPATIENT
Start: 2024-07-14

## 2024-07-14 RX ORDER — IRBESARTAN 300 MG/1
300 TABLET ORAL NIGHTLY
Qty: 90 TABLET | Refills: 2 | Status: SHIPPED | OUTPATIENT
Start: 2024-07-14

## 2024-07-17 LAB — VIT B1 BLD-MCNC: 58 UG/L (ref 38–122)

## 2024-07-30 ENCOUNTER — HOSPITAL ENCOUNTER (OUTPATIENT)
Dept: RADIOLOGY | Facility: HOSPITAL | Age: 79
Discharge: HOME | End: 2024-07-30
Attending: INTERNAL MEDICINE
Payer: MEDICARE

## 2024-07-30 ENCOUNTER — PATIENT MESSAGE (OUTPATIENT)
Dept: PRIMARY CARE CLINIC | Facility: CLINIC | Age: 79
End: 2024-07-30
Payer: MEDICARE

## 2024-07-30 VITALS — HEIGHT: 62 IN | WEIGHT: 230 LBS | BODY MASS INDEX: 42.33 KG/M2

## 2024-07-30 DIAGNOSIS — Z12.31 ENCOUNTER FOR SCREENING MAMMOGRAM FOR BREAST CANCER: ICD-10-CM

## 2024-07-30 PROCEDURE — 77063 BREAST TOMOSYNTHESIS BI: CPT | Mod: TC,PO

## 2024-07-31 ENCOUNTER — TELEPHONE (OUTPATIENT)
Dept: CARDIOLOGY | Facility: HOSPITAL | Age: 79
End: 2024-07-31
Payer: MEDICARE

## 2024-08-02 ENCOUNTER — HOSPITAL ENCOUNTER (OUTPATIENT)
Dept: CARDIOLOGY | Facility: HOSPITAL | Age: 79
Discharge: HOME | End: 2024-08-02
Attending: PHYSICIAN ASSISTANT
Payer: MEDICARE

## 2024-08-02 VITALS
HEART RATE: 92 BPM | HEIGHT: 62 IN | SYSTOLIC BLOOD PRESSURE: 119 MMHG | BODY MASS INDEX: 42.33 KG/M2 | WEIGHT: 230 LBS | DIASTOLIC BLOOD PRESSURE: 61 MMHG

## 2024-08-02 DIAGNOSIS — R07.9 CHEST PAIN, UNSPECIFIED TYPE: ICD-10-CM

## 2024-08-02 DIAGNOSIS — I25.810 CORONARY ARTERY DISEASE INVOLVING CORONARY BYPASS GRAFT OF NATIVE HEART WITHOUT ANGINA PECTORIS: ICD-10-CM

## 2024-08-02 LAB
CFR FLOW - ANTERIOR: 1.11
CFR FLOW - INFERIOR: 1.4
CFR FLOW - LATERAL: 1.26
CFR FLOW - MAX: 1.87
CFR FLOW - MIN: 0.81
CFR FLOW - SEPTAL: 1.54
CFR FLOW - WHOLE HEART: 1.33
CV PHARM DOSE: 0.4 MG
CV STRESS BASE HR: 92 BPM
DIASTOLIC BLOOD PRESSURE: 61 MMHG
EJECTION FRACTION- HIGH: 59 %
END DIASTOLIC INDEX-HIGH: 155 ML/M2
END DIASTOLIC INDEX-LOW: 91 ML/M2
END SYSTOLIC INDEX-HIGH: 78 ML/M2
END SYSTOLIC INDEX-LOW: 40 ML/M2
NUC REST DIASTOLIC VOLUME INDEX: 67
NUC REST EJECTION FRACTION: 79
NUC REST SYSTOLIC VOLUME INDEX: 14
NUC STRESS DIASTOLIC VOLUME INDEX: 65
NUC STRESS EJECTION FRACTION: 72 %
NUC STRESS SYSTOLIC VOLUME INDEX: 18
OHS CV CPX 85 PERCENT MAX PREDICTED HEART RATE MALE: 122
OHS CV CPX MAX PREDICTED HEART RATE: 143
OHS CV CPX PATIENT IS FEMALE: 1
OHS CV CPX PATIENT IS MALE: 0
OHS CV CPX PEAK DIASTOLIC BLOOD PRESSURE: 56 MMHG
OHS CV CPX PEAK HEAR RATE: 101 BPM
OHS CV CPX PEAK RATE PRESSURE PRODUCT: 9595
OHS CV CPX PEAK SYSTOLIC BLOOD PRESSURE: 95 MMHG
OHS CV CPX PERCENT MAX PREDICTED HEART RATE ACHIEVED: 73
OHS CV CPX RATE PRESSURE PRODUCT PRESENTING: NORMAL
PERFUSION DEFECT 1 SIZE IN %: 3 %
PERFUSION DEFECT 2 SIZE IN %: 3 %
REST FLOW - ANTERIOR: 0.66 CC/MIN/G
REST FLOW - INFERIOR: 0.74 CC/MIN/G
REST FLOW - LATERAL: 0.74 CC/MIN/G
REST FLOW - MAX: 1.02 CC/MIN/G
REST FLOW - MIN: 0.44 CC/MIN/G
REST FLOW - SEPTAL: 0.71 CC/MIN/G
REST FLOW - WHOLE HEART: 0.71 CC/MIN/G
RETIRED EF AND QEF - SEE NOTES: 47 %
STRESS FLOW - ANTERIOR: 0.72 CC/MIN/G
STRESS FLOW - INFERIOR: 1.03 CC/MIN/G
STRESS FLOW - LATERAL: 0.93 CC/MIN/G
STRESS FLOW - MAX: 1.41 CC/MIN/G
STRESS FLOW - MIN: 0.6 CC/MIN/G
STRESS FLOW - SEPTAL: 1.08 CC/MIN/G
STRESS FLOW - WHOLE HEART: 0.94 CC/MIN/G
SYSTOLIC BLOOD PRESSURE: 119 MMHG

## 2024-08-02 PROCEDURE — A9555 RB82 RUBIDIUM: HCPCS | Performed by: PHYSICIAN ASSISTANT

## 2024-08-02 PROCEDURE — 78434 AQMBF PET REST & RX STRESS: CPT

## 2024-08-02 PROCEDURE — 78431 MYOCRD IMG PET RST&STRS CT: CPT | Mod: 26,,, | Performed by: INTERNAL MEDICINE

## 2024-08-02 PROCEDURE — 78431 MYOCRD IMG PET RST&STRS CT: CPT

## 2024-08-02 PROCEDURE — 93018 CV STRESS TEST I&R ONLY: CPT | Mod: ,,, | Performed by: INTERNAL MEDICINE

## 2024-08-02 PROCEDURE — 78434 AQMBF PET REST & RX STRESS: CPT | Mod: 26,,, | Performed by: INTERNAL MEDICINE

## 2024-08-02 PROCEDURE — 63600175 PHARM REV CODE 636 W HCPCS: Performed by: PHYSICIAN ASSISTANT

## 2024-08-02 PROCEDURE — 93016 CV STRESS TEST SUPVJ ONLY: CPT | Mod: ,,, | Performed by: INTERNAL MEDICINE

## 2024-08-02 RX ORDER — AMINOPHYLLINE 25 MG/ML
75 INJECTION, SOLUTION INTRAVENOUS ONCE
Status: COMPLETED | OUTPATIENT
Start: 2024-08-02 | End: 2024-08-02

## 2024-08-02 RX ORDER — REGADENOSON 0.08 MG/ML
0.4 INJECTION, SOLUTION INTRAVENOUS
Status: COMPLETED | OUTPATIENT
Start: 2024-08-02 | End: 2024-08-02

## 2024-08-02 RX ADMIN — AMINOPHYLLINE 75 MG: 25 INJECTION, SOLUTION INTRAVENOUS at 03:08

## 2024-08-02 RX ADMIN — REGADENOSON 0.4 MG: 0.08 INJECTION, SOLUTION INTRAVENOUS at 03:08

## 2024-08-02 RX ADMIN — RUBIDIUM CHLORIDE RB-82 32 MILLICURIE: 150 INJECTION, SOLUTION INTRAVENOUS at 03:08

## 2024-08-02 RX ADMIN — RUBIDIUM CHLORIDE RB-82 31.8 MILLICURIE: 150 INJECTION, SOLUTION INTRAVENOUS at 03:08

## 2024-08-06 ENCOUNTER — PATIENT MESSAGE (OUTPATIENT)
Dept: CARDIOLOGY | Facility: CLINIC | Age: 79
End: 2024-08-06
Payer: MEDICARE

## 2024-08-06 DIAGNOSIS — I25.118 CORONARY ARTERY DISEASE OF NATIVE ARTERY OF NATIVE HEART WITH STABLE ANGINA PECTORIS: ICD-10-CM

## 2024-08-06 RX ORDER — CLOPIDOGREL BISULFATE 75 MG/1
75 TABLET ORAL DAILY
Qty: 90 TABLET | Refills: 3 | Status: SHIPPED | OUTPATIENT
Start: 2024-08-06

## 2024-08-07 ENCOUNTER — PATIENT MESSAGE (OUTPATIENT)
Dept: CARDIOLOGY | Facility: CLINIC | Age: 79
End: 2024-08-07
Payer: MEDICARE

## 2024-08-09 ENCOUNTER — LAB VISIT (OUTPATIENT)
Dept: LAB | Facility: HOSPITAL | Age: 79
End: 2024-08-09
Attending: NURSE PRACTITIONER
Payer: MEDICARE

## 2024-08-09 DIAGNOSIS — E11.22 TYPE 2 DIABETES MELLITUS WITH STAGE 3A CHRONIC KIDNEY DISEASE, WITHOUT LONG-TERM CURRENT USE OF INSULIN: Chronic | ICD-10-CM

## 2024-08-09 DIAGNOSIS — N18.31 TYPE 2 DIABETES MELLITUS WITH STAGE 3A CHRONIC KIDNEY DISEASE, WITHOUT LONG-TERM CURRENT USE OF INSULIN: Chronic | ICD-10-CM

## 2024-08-09 LAB
ALBUMIN SERPL BCP-MCNC: 3.5 G/DL (ref 3.5–5.2)
ALP SERPL-CCNC: 69 U/L (ref 55–135)
ALT SERPL W/O P-5'-P-CCNC: 16 U/L (ref 10–44)
ANION GAP SERPL CALC-SCNC: 11 MMOL/L (ref 8–16)
AST SERPL-CCNC: 17 U/L (ref 10–40)
BILIRUB SERPL-MCNC: 0.4 MG/DL (ref 0.1–1)
BUN SERPL-MCNC: 21 MG/DL (ref 8–23)
CALCIUM SERPL-MCNC: 9.9 MG/DL (ref 8.7–10.5)
CHLORIDE SERPL-SCNC: 104 MMOL/L (ref 95–110)
CHOLEST SERPL-MCNC: 90 MG/DL (ref 120–199)
CHOLEST/HDLC SERPL: 2.9 {RATIO} (ref 2–5)
CO2 SERPL-SCNC: 22 MMOL/L (ref 23–29)
CREAT SERPL-MCNC: 1.2 MG/DL (ref 0.5–1.4)
EST. GFR  (NO RACE VARIABLE): 46.6 ML/MIN/1.73 M^2
ESTIMATED AVG GLUCOSE: 146 MG/DL (ref 68–131)
GLUCOSE SERPL-MCNC: 174 MG/DL (ref 70–110)
HBA1C MFR BLD: 6.7 % (ref 4–5.6)
HDLC SERPL-MCNC: 31 MG/DL (ref 40–75)
HDLC SERPL: 34.4 % (ref 20–50)
LDLC SERPL CALC-MCNC: 40.2 MG/DL (ref 63–159)
NONHDLC SERPL-MCNC: 59 MG/DL
POTASSIUM SERPL-SCNC: 4.7 MMOL/L (ref 3.5–5.1)
PROT SERPL-MCNC: 7.7 G/DL (ref 6–8.4)
SODIUM SERPL-SCNC: 137 MMOL/L (ref 136–145)
TRIGL SERPL-MCNC: 94 MG/DL (ref 30–150)

## 2024-08-09 PROCEDURE — 36415 COLL VENOUS BLD VENIPUNCTURE: CPT | Mod: PN | Performed by: NURSE PRACTITIONER

## 2024-08-09 PROCEDURE — 83036 HEMOGLOBIN GLYCOSYLATED A1C: CPT | Performed by: NURSE PRACTITIONER

## 2024-08-09 PROCEDURE — 80061 LIPID PANEL: CPT | Performed by: INTERNAL MEDICINE

## 2024-08-09 PROCEDURE — 80053 COMPREHEN METABOLIC PANEL: CPT | Performed by: NURSE PRACTITIONER

## 2024-08-13 NOTE — PROGRESS NOTES
Patient ID: Kaylee Romero is a 79 y.o. female    Chief Complaint:   Chief Complaint   Patient presents with    Diabetes     HPI: Kaylee Romero with type 2 diabetes complicated by CAD s/p CABG, TIAs, hypertension, dyslipidemia, CKD Stage 3, obesity, osteoporosis, and KAMRAN presents for follow up of Type 2 diabetes and osteoporosis. Previous patient of mine. Last visit in April 2024.    She had right knee arthroscope in March 2023. She is no longer using walker or cane.     She has been able to stop insulin but had to resume glipizide with biggest meal of the day.     With regards to the diabetes:     Diagnosed with Type 2 diabetes: 2013  Family history of Type 2 diabetes: father  Known complications:  DKA-  RN-; note reviewed; has appt upcoming 9/2024  PN- seeing podiatry 6/2024; denies tingling in her hands   Nephropathy: now seeing nephrology 6/2024  Gastroparesis: denies   CAD: 3 MI and one stroke     Current regimen:  Trulicity 4.5 mg weekly   Glipizide 5 mg with biggest meal of the day     NOVOLOG PRN steroid injection-none recent    Other medications tried:  Jardiance-insurance issues  Brad Whitlock - did not want to start due to SE profile  Trulicity      Glucose Monitor:  Dexcom G7  Dexcom: average blood sugar 187; 0% low; 48% in range; 46% high; 6% very high  Dexcom:higher blood sugars during the day   Reviewed 2 weeks ago and blood sugars were at goal.    Hypoglycemia: none and denies hypoglycemia   Denies symptoms of hypoglycemia-hypoglycemia unawareness     Knows how to correct with 15 grams of carbs- juice, coke, or a peppermint.      Diet/Exercise: She was seeing a dietician -Jimmy. She is now on protein shakes and food -careful with her food choices.   Usually gives up sweets for LENT.   Strawberries and prunes to treat hypoglycemia.  Snacks: more sweets lately  Drinks: mostly water  Exercise - she is doing bike 60 minutes 1 times a week and planning on increasing     Education - last  visit: saw in Nov 2023  Has GVOKE    Denies history of pancreatitis & personal/family history of medullary thyroid cancer.    Lab Results   Component Value Date    HGBA1C 6.7 (H) 08/09/2024    HGBA1C 7.1 (H) 04/09/2024    HGBA1C 6.5 (H) 01/24/2024     Screening or Prevention Patient's value Goal Complete/Controlled?   HgA1C Testing and Control   Lab Results   Component Value Date    HGBA1C 6.7 (H) 08/09/2024      Annually/Less than 8% Yes   Lipid profile : 08/09/2024 Annually Yes   LDL control Lab Results   Component Value Date    LDLCALC 40.2 (L) 08/09/2024    Annually/Less than 100 mg/dl  Yes   Nephropathy screening Lab Results   Component Value Date    LABMICR 5.0 07/18/2023     Lab Results   Component Value Date    PROTEINUA Negative 06/21/2024    Annually No   Blood pressure BP Readings from Last 1 Encounters:   08/16/24 110/72    Less than 140/90 Yes   Dilated retinal exam : 05/26/2023 Annually Yes   Foot exam   : 09/05/2023 Annually Yes     With regards to osteoporosis:   Current meds: Fosamax  Started: 6/2019    Family history: mother     Calcium intake- no calcium supplementation; has in diet   Vit D intake-  1000 twice weekly   Weight bearing exercise-   Walking as tolerated with knee pain    Recent falls- August 2018; September 2018 - no fractures and no falls from a standing position.      Steroid injection in spine around 11/15/23  Numbness caused falls below   3 falls in 6 weeks from Oct 2023-Nov 2023   Pain med steroids in Nov 2023     They have made fall precautions in house   No recent fractures   No significant height loss (>2 inches)     tob use -  None  etoh use- some 1-2 glasses of wine every once in awhile     No current diarrhea or h/o malabsorption     Menopause at age 56     Denies chronic exposure to anticoagulants, proton pump inhibitors or antiseizure medications.   +Steroid injections knees     She is using walker at home  She is using wheelchair in clinic    Denies GERD, indigestion, or  gastric bypass surgery.      Denies history of thyroid disease, anemia, kidney stones or kidney disease.      Denies active malignancy, history of malignancy the involved the bone, prior radiation treatment, or unexplained elevations of alk phos on labs      BMD 6/9/2023  FINDINGS:  Lumbar spine (L1-L4):               T-score is 0.7, and Z-score is 2.5.     Compared with previous DXA, BMD at the lumbar spine has remained stable.     Femoral neck:                          T-score is -1.9, and Z-score is -0.6.     Total hip:                                T-score is -0.8, and Z-score is 0.1.     Compared with previous DXA, BMD at the total hip has remained stable.     Distal 1/3 radius:                      Not applicable      Fracture Risk (FRAX)   7.7% risk of a major osteoporotic fracture in the next 10 years.   1.5% risk of hip fracture in the next 10 years.     Impression:   *Osteoporosis on treatment with Fosamax     RECOMMENDATIONS:  *Daily calcium intake 4407-4591 mg, dietary sources preferred; Vitamin D 6760-7986 IU daily.  *Weight bearing exercise and fall precautions.  *If the patient has been treated with an oral bisphosphonate for a period of at least 5 years and has not fractured, a drug holiday can be considered. Continuing treatment is also reasonable if the patient is deemed to be at high risk for fracture.  *Repeat BMD in 2 years.     With regards to the vitamin d deficiency and hypercalcemia,     Transient increase in calcium level in Sept 2021 that normalized when we decrease Vitamin D and stopped chlorthalidone     Currently taking otc 1000 twice weekly    Lab Results   Component Value Date    PTH 55.4 06/21/2024    CALCIUM 9.9 08/09/2024    PHOS 3.7 06/21/2024     Lab Results   Component Value Date    ALBUMIN 3.5 08/09/2024     Vit D, 25-Hydroxy   Date Value Ref Range Status   06/21/2024 47 30 - 96 ng/mL Final     Comment:     Vitamin D deficiency.........<10 ng/mL                               Vitamin D insufficiency......10-29 ng/mL       Vitamin D sufficiency........> or equal to 30 ng/mL  Vitamin D toxicity............>100 ng/mL       Denies constipation, depression,   Increased polyuria due to lasix  + muscle aches or pains.    No recent fractures     Has seen GI for diarrhea and does take Lomtil with some relief.     FIB-4 Calculation: 1.57 at 8/16/2024  2:01 PM     FIB-4 below 1.30 is considered as low-risk for advanced fibrosis  FIB-4 over 2.67 is considered as high-risk for advanced fibrosis  FIB-4 values between 1.30 and 2.67 are considered as intermediate-risk of advanced fibrosis for ages 36-64.     For ages > 64 the cut-off for low-risk goes to < 2.  This is a screening tool and clinical judgement should be used in the interpretation of these results.    Review of Systems   Constitutional:  Negative for fatigue and unexpected weight change.   Eyes:  Negative for visual disturbance.   Endocrine: Negative for polydipsia, polyphagia and polyuria.   Musculoskeletal:  Positive for arthralgias and myalgias. Negative for gait problem and neck pain.   Hematological:  Does not bruise/bleed easily.     As above    OBJECTIVE    Physical Exam  Constitutional:       Appearance: Normal appearance.   Neck:      Thyroid: No thyromegaly.   Pulmonary:      Effort: Pulmonary effort is normal.   Neurological:      Mental Status: She is alert.     injection sites are without edema or erythema. No lipo hypertropthy or atrophy  DM foot exam deferred; done in 9/2023    Wt Readings from Last 3 Encounters:   08/16/24 1308 106.6 kg (235 lb 0.2 oz)   08/02/24 1508 104.3 kg (230 lb)   07/30/24 1327 104.3 kg (230 lb)     Vitals:    08/16/24 1308   BP: 110/72   Pulse: 102       Current Outpatient Medications:     acetaminophen (TYLENOL) 500 MG tablet, Take 500 mg by mouth 2 (two) times daily as needed for Pain., Disp: , Rfl:     alendronate (FOSAMAX) 70 MG tablet, Take 1 tablet (70 mg total) by mouth every 7 days., Disp:  "12 tablet, Rfl: 2    amLODIPine (NORVASC) 10 MG tablet, Take 1 tablet (10 mg total) by mouth once daily., Disp: 90 tablet, Rfl: 2    ammonium lactate (LAC-HYDRIN) 12 % lotion, Apply topically as needed for Dry Skin. On the feet and toenails., Disp: 225 g, Rfl: 6    atorvastatin (LIPITOR) 80 MG tablet, Take 1 tablet (80 mg total) by mouth once daily., Disp: 90 tablet, Rfl: 3    BD BOO 2ND GEN PEN NEEDLE 32 gauge x 5/32" Ndle, To injection daily, Disp: 100 each, Rfl: 8    blood sugar diagnostic Strp, 1 strip by Misc.(Non-Drug; Combo Route) route once daily., Disp: 100 strip, Rfl: 3    blood-glucose meter,continuous (DEXCOM G7 ) Misc, To use with sensor, Disp: 1 each, Rfl: 0    blood-glucose sensor (DEXCOM G7 SENSOR) Areli, To change every 10 days, Disp: 3 each, Rfl: 3    carvediloL (COREG) 25 MG tablet, TAKE 1 TABLET(25 MG) BY MOUTH TWICE DAILY WITH MEALS, Disp: 180 tablet, Rfl: 2    clopidogreL (PLAVIX) 75 mg tablet, Take 1 tablet (75 mg total) by mouth once daily., Disp: 90 tablet, Rfl: 3    cyclobenzaprine (FLEXERIL) 10 MG tablet, Take 1/2 tablet by mouth three times a day as needed for muscle spasms, Disp: 90 tablet, Rfl: 1    diclofenac sodium (VOLTAREN) 1 % Gel, Apply 2 g topically 4 (four) times daily as needed. To painful area on the feet. (Patient taking differently: Apply 2 g topically 4 (four) times daily as needed. To painful area on the feet and knee), Disp: 500 g, Rfl: 5    diphenoxylate-atropine 2.5-0.025 mg (LOMOTIL) 2.5-0.025 mg per tablet, Take 1 tablet by mouth 4 (four) times daily as needed for Diarrhea. for diarrhea, Disp: 30 tablet, Rfl: 3    dulaglutide (TRULICITY) 4.5 mg/0.5 mL pen injector, Inject 4.5 mg into the skin every 7 days., Disp: 4 pen , Rfl: 37    ferrous sulfate (FEROSUL) 325 mg (65 mg iron) Tab tablet, Take 1 tablet (325 mg total) by mouth once daily., Disp: 90 tablet, Rfl: 1    furosemide (LASIX) 20 MG tablet, TAKE 1 TABLET BY MOUTH ON SATURDAY, SUNDAY, MONDAY, WEDNESDAY, " FRIDAY, Disp: 90 tablet, Rfl: 3    gabapentin (NEURONTIN) 100 MG capsule, Take 1-2 capsules (100-200 mg total) by mouth every evening., Disp: 90 capsule, Rfl: 2    glucagon (GVOKE HYPOPEN 2-PACK) 1 mg/0.2 mL AtIn, Inject 1 Package into the skin as needed., Disp: 2 Syringe, Rfl: 0    irbesartan (AVAPRO) 300 MG tablet, Take 1 tablet (300 mg total) by mouth every evening., Disp: 90 tablet, Rfl: 2    isosorbide mononitrate (IMDUR) 30 MG 24 hr tablet, Take 2 tablets (60 mg total) by mouth once daily., Disp: 180 tablet, Rfl: 3    lancets Misc, 1 lancet by Misc.(Non-Drug; Combo Route) route once daily., Disp: 100 each, Rfl: 3    multivitamin (THERAGRAN) per tablet, Take 1 tablet by mouth once daily., Disp: , Rfl:     nitroGLYCERIN (NITROSTAT) 0.4 MG SL tablet, Place 1 tablet (0.4 mg total) under the tongue every 5 (five) minutes as needed for Chest pain., Disp: 100 tablet, Rfl: 1    vitamin D (VITAMIN D3) 1000 units Tab, Take 1,000 Units by mouth twice a week. Monday AND THURSDAYS ONLY, Disp: , Rfl:     aspirin (ECOTRIN) 81 MG EC tablet, Take 1 tablet (81 mg total) by mouth 2 (two) times a day., Disp: 60 tablet, Rfl: 0    finerenone 10 mg Tab, Take 1 tablet by mouth once daily., Disp: 30 tablet, Rfl: 11    glipiZIDE (GLUCOTROL) 5 MG tablet, Take 2 tablets (10 mg total) by mouth 2 (two) times daily with meals., Disp: 360 tablet, Rfl: 3  No current facility-administered medications for this visit.    Facility-Administered Medications Ordered in Other Visits:     0.9%  NaCl infusion, , Intravenous, Continuous, Doug De Anda MD    Labs reviewed    Assessment and plan:   1. Osteoporosis without current pathological fracture, unspecified osteoporosis type        2. Class 3 severe obesity due to excess calories with serious comorbidity and body mass index (BMI) of 40.0 to 44.9 in adult        3. Age-related osteoporosis without current pathological fracture        4. Dyslipidemia, goal LDL below 70        5. Essential  hypertension        6. Type 2 diabetes mellitus with stage 3a chronic kidney disease, without long-term current use of insulin  finerenone 10 mg Tab    Comprehensive Metabolic Panel    Hemoglobin A1C        Osteoporosis without current pathological fracture  -- Continue fosamax weekly as long as GFR supports  -- Reviewed basics of quantity versus quality  -- Reassuring she is not fracturing   -- RDA of calcium (9006-0501 mg /day in divided doses) and vitamin D 2000iu a day  discussed  -- Calcium from food sources preferred  -- Fall precautions emphasized  -- +weight bearing exercise  -- Repeat DEXA scan in June 2025  Consider drug holiday after next bone density     Class 3 severe obesity due to excess calories with serious comorbidity and body mass index (BMI) of 40.0 to 44.9 in adult  Encouraged continued exercise and diet.  Given information on diet  Continue trulicity that will aid in weight loss.    Age-related osteoporosis without current pathological fracture  -- Continue fosamax weekly  -- Reviewed basics of quantity versus quality  -- Reassuring she is not fracturing   -- RDA of calcium (1582-8526 mg /day in divided doses) and vitamin D 2000iu a day  discussed  -- Calcium from food sources preferred  -- Fall precautions emphasized  -- +weight bearing exercise  -- Repeat DEXA scan in 2025    Dyslipidemia, goal LDL below 70  -- Controlled   -- On statin per ADA recommendations    Essential hypertension  -- on ARB  -- Controlled  -- Blood pressure goals discussed with patient    Type 2 diabetes mellitus with stage 3 chronic kidney disease, without long-term current use of insulin  -- Labs prior  -- A1c 7-7.5% without hypoglycemia.   A1c improved but her GMI is rising on dexcom over the last couple of weeks  Add finerenone 10 mg daily for kidney protection    Dexcom: average blood sugar 187; 0% low; 48% in range; 46% high; 6% very high  Dexcom:higher blood sugars during the day   Reviewed 2 weeks ago and blood  sugars were at goal.    She is having hypoglycemia unawareness. I recommend dexcom for high and low blood sugar alerts. She also had hypoglycemia on labs BG 66 and did was not aware.      Patient has diabetes mellitus and manages diabetes with an intensive insulin regimen. The patient requires a therapeutic CGM and is willing to use therapeutic CGM for the necesary frequent adjustments of insulin therapy. Patient has been using SMBG for frequent glucose monitoring (4+ times daily). I have completed an in-person visit during the previous 6 months and will continue to have in-person visits every 6 months to assess adherence to their CGM regimen and diabetes treatment plan.     Medication Changes   Continue Trulicity 4.5 mg weekly  Increase glipizide to 10 mg with biggest meal of the day     Consider switching to glimepiride in the future   Come by clinic on 9/9/24 and let me download your dexcom.    -- We will continue novolog for steroid injections. Discussed she will take the novolog before meals with sliding scale below.  With using correction scale:  MDD of:   150-200: +2 units  201-250: +4 units  251-300: +6 units  301-350: +8 units  >350: +10 units    -- Has GVOKE pen.   -- She is fearful of DKA Discussed signs and symptoms of DKA and instructed to buy ketone strips  -- Reviewed goals of therapy are to get the best control we can without hypoglycemia  -- Insulin injection sites and proper rotation instructed.    -- Advised frequent self blood glucose monitoring.  Patient encouraged to document glucose results and bring them to every clinic visit.  -- Hypoglycemia precautions discussed. Instructed on precautions before driving.    -- Call for Bg repeatedly < 90 or > 180.   -- Close adherence to lifestyle changes recommended.   -- Periodic follow ups for eye evaluations, foot care and dental care suggested. UTD    Follow up in about 5 months (around 1/16/2025).  Labs prior to RTC

## 2024-08-16 ENCOUNTER — PATIENT MESSAGE (OUTPATIENT)
Dept: ENDOCRINOLOGY | Facility: CLINIC | Age: 79
End: 2024-08-16

## 2024-08-16 ENCOUNTER — RESEARCH ENCOUNTER (OUTPATIENT)
Dept: ENDOCRINOLOGY | Facility: CLINIC | Age: 79
End: 2024-08-16

## 2024-08-16 ENCOUNTER — OFFICE VISIT (OUTPATIENT)
Dept: ENDOCRINOLOGY | Facility: CLINIC | Age: 79
End: 2024-08-16
Payer: MEDICARE

## 2024-08-16 VITALS
SYSTOLIC BLOOD PRESSURE: 110 MMHG | WEIGHT: 235 LBS | HEART RATE: 102 BPM | DIASTOLIC BLOOD PRESSURE: 72 MMHG | HEIGHT: 62 IN | BODY MASS INDEX: 43.24 KG/M2

## 2024-08-16 DIAGNOSIS — M81.0 OSTEOPOROSIS WITHOUT CURRENT PATHOLOGICAL FRACTURE, UNSPECIFIED OSTEOPOROSIS TYPE: Primary | Chronic | ICD-10-CM

## 2024-08-16 DIAGNOSIS — E66.01 CLASS 3 SEVERE OBESITY DUE TO EXCESS CALORIES WITH SERIOUS COMORBIDITY AND BODY MASS INDEX (BMI) OF 40.0 TO 44.9 IN ADULT: ICD-10-CM

## 2024-08-16 DIAGNOSIS — N18.31 TYPE 2 DIABETES MELLITUS WITH STAGE 3A CHRONIC KIDNEY DISEASE, WITHOUT LONG-TERM CURRENT USE OF INSULIN: ICD-10-CM

## 2024-08-16 DIAGNOSIS — I10 ESSENTIAL HYPERTENSION: Chronic | ICD-10-CM

## 2024-08-16 DIAGNOSIS — E11.22 TYPE 2 DIABETES MELLITUS WITH STAGE 3A CHRONIC KIDNEY DISEASE, WITHOUT LONG-TERM CURRENT USE OF INSULIN: ICD-10-CM

## 2024-08-16 DIAGNOSIS — E78.5 DYSLIPIDEMIA, GOAL LDL BELOW 70: ICD-10-CM

## 2024-08-16 DIAGNOSIS — M81.0 AGE-RELATED OSTEOPOROSIS WITHOUT CURRENT PATHOLOGICAL FRACTURE: ICD-10-CM

## 2024-08-16 PROCEDURE — 99999 PR PBB SHADOW E&M-EST. PATIENT-LVL III: CPT | Mod: PBBFAC,,, | Performed by: NURSE PRACTITIONER

## 2024-08-16 NOTE — PATIENT INSTRUCTIONS
Please make appt with eye doctor      Diabetes  A1c improved but her GMI is rising on dexcom over the last couple of weeks  Add finerenone 10 mg daily for kidney protection    Dexcom: average blood sugar 187; 0% low; 48% in range; 46% high; 6% very high  Dexcom:higher blood sugars during the day   Reviewed 2 weeks ago and blood sugars were at goal.    She is having hypoglycemia unawareness. I recommend dexcom for high and low blood sugar alerts. She also had hypoglycemia on labs BG 66 and did was not aware.      Patient has diabetes mellitus and manages diabetes with an intensive insulin regimen. The patient requires a therapeutic CGM and is willing to use therapeutic CGM for the necesary frequent adjustments of insulin therapy. Patient has been using SMBG for frequent glucose monitoring (4+ times daily). I have completed an in-person visit during the previous 6 months and will continue to have in-person visits every 6 months to assess adherence to their CGM regimen and diabetes treatment plan.     Medication Changes   Continue Trulicity 4.5 mg weekly  Increase glipizide to 10 mg with biggest meal of the day     Consider switching to glimepiride in the future   Come by clinic on 9/9/24 and let me download your dexcom.    Osteoporosis               Continue Vitamin D 2000 IU twice weekly               RDA of calcium is 0541-4234 mg daily, preferable from food               Avoid alcohol and tobacco and falls              Continue fosamax until 6/2024              Check bone density in June 2025    Snacks can be an important part of a balanced, healthy meal plan. They allow you to eat more frequently, feeling full and satisfied throughout the day. Also, they allow you to spread carbohydrates evenly, which may stabilize blood sugars.  Plus, snacks are enjoyable!     The amount of carbohydrate needed at snacks varies. Generally, about 15-30 grams of carbohydrate per snack is recommended.  Below you will find some tasty  treats.       0-5 gm carb  Crystal Light  Vitamin Water Zero  Herbal tea, unsweetened  2 tsp peanut butter on celery  1./2 cup sugar-free jell-o  1 sugar-free popsicle  ¼ cup blueberries  8oz Blue Jennifer unsweetened almond milk  5 baby carrots & celery sticks, cucumbers, bell peppers dipped in ¼ cup salsa, 2Tbsp light ranch dressing or 2Tbsp plain Greek yogurt  10 Goldfish crackers  ½ oz low-fat cheese or string cheese  1 closed handful of nuts, unsalted  1 Tbsp of sunflower seeds, unsalted  1 cup Smart Pop popcorn  1 whole grain brown rice cake        15 gm carb  1 small piece of fruit or ½ banana or 1/2 cup lite canned fruit  3 jesse cracker squares  3 cups Smart Pop popcorn, top spray butter, [place] lite salt or cinnamon and Truvia  5 Vanilla Wafers  ½ cup low fat, no added sugar ice cream or frozen yogurt (Blue bell, Blue Bunny, Weight Watchers, Skinny Cow)  ½ turkey, ham, or chicken sandwich  ½ c fruit with ½ c Cottage cheese  4-6 unsalted wheat crackers with 1 oz low fat cheese or 1 tbsp peanut butter   30-45 goldfish crackers (depending on flavor)   7-8 Christian mini brown rice cakes (caramel, apple cinnamon, chocolate)   12 Christian mini brown rice cakes (cheddar, bbq, ranch)   1/3 cup hummus dip with raw veg  1/2 whole wheat yudy, 1Tbsp hummus  Mini Pizza (1/2 whole wheat English muffin, low-fat  cheese, tomato sauce)  100 calorie snack pack (Oreo, Chips Ahoy, Ritz Mix, Baked Cheetos)  4-6 oz. light or Greek Style yogurt (Chobani, Yoplait, Okios, [place])  ½ cup sugar-free pudding    6 in. wheat tortilla or yudy oven toasted chips (topped with spray butter flavoring, cinnamon, Truvia OR spray butter, garlic powder, chili powder)   18 BBQ Popchips (available at [employer], [employer], [employer])

## 2024-08-16 NOTE — ASSESSMENT & PLAN NOTE
-- Continue fosamax weekly as long as GFR supports  -- Reviewed basics of quantity versus quality  -- Reassuring she is not fracturing   -- RDA of calcium (1349-3696 mg /day in divided doses) and vitamin D 2000iu a day  discussed  -- Calcium from food sources preferred  -- Fall precautions emphasized  -- +weight bearing exercise  -- Repeat DEXA scan in June 2025  Consider drug holiday after next bone density

## 2024-08-16 NOTE — ASSESSMENT & PLAN NOTE
Encouraged continued exercise and diet.  Given information on diet  Continue trulicity that will aid in weight loss.

## 2024-08-16 NOTE — PROGRESS NOTES
Study Title: FINE-REAL: A non-interventional study providing insights into the use of finerenone in a routine clinical setting   Short Title: Fine REAL  NCT Number: [id number]  IRB # / Approval Date: 2022.129 / 13 Feb 2023  Sponsor: Jose L  : Dr. Vinay Arzola            I have reviewed the patient's chart and confirm that she is a possible candidate for the FINE-REAL Study. CRC will reach out to the patient's endocrine clinician to discuss possible enrollment.       Inclusion Criteria   Yes Adult female or male participant (?18 years old)    Yes Diagnosis of CKD associated with T2D based on assessment by physician   Yes Treatment according to local marketing authorization, finerenone 20 or 10 mg. Treatment should have been started up to 8 weeks before or after the ICF is signed.    Yes Decision to initiate treatment with finerenone must be made before ICF is signed    Exclusion Criteria   No Participation in an investigational trial at any time during the course of this study    No Contra-indications according to the local label

## 2024-08-16 NOTE — ASSESSMENT & PLAN NOTE
-- Labs prior  -- A1c 7-7.5% without hypoglycemia.   A1c improved but her GMI is rising on dexcom over the last couple of weeks  Add finerenone 10 mg daily for kidney protection    Dexcom: average blood sugar 187; 0% low; 48% in range; 46% high; 6% very high  Dexcom:higher blood sugars during the day   Reviewed 2 weeks ago and blood sugars were at goal.    She is having hypoglycemia unawareness. I recommend dexcom for high and low blood sugar alerts. She also had hypoglycemia on labs BG 66 and did was not aware.      Patient has diabetes mellitus and manages diabetes with an intensive insulin regimen. The patient requires a therapeutic CGM and is willing to use therapeutic CGM for the necesary frequent adjustments of insulin therapy. Patient has been using SMBG for frequent glucose monitoring (4+ times daily). I have completed an in-person visit during the previous 6 months and will continue to have in-person visits every 6 months to assess adherence to their CGM regimen and diabetes treatment plan.     Medication Changes   Continue Trulicity 4.5 mg weekly  Increase glipizide to 10 mg with biggest meal of the day     Consider switching to glimepiride in the future   Come by clinic on 9/9/24 and let me download your dexcom.    -- We will continue novolog for steroid injections. Discussed she will take the novolog before meals with sliding scale below.  With using correction scale:  MDD of:   150-200: +2 units  201-250: +4 units  251-300: +6 units  301-350: +8 units  >350: +10 units    -- Has GVOKE pen.   -- She is fearful of DKA Discussed signs and symptoms of DKA and instructed to buy ketone strips  -- Reviewed goals of therapy are to get the best control we can without hypoglycemia  -- Insulin injection sites and proper rotation instructed.    -- Advised frequent self blood glucose monitoring.  Patient encouraged to document glucose results and bring them to every clinic visit.  -- Hypoglycemia precautions  discussed. Instructed on precautions before driving.    -- Call for Bg repeatedly < 90 or > 180.   -- Close adherence to lifestyle changes recommended.   -- Periodic follow ups for eye evaluations, foot care and dental care suggested. UTD

## 2024-08-16 NOTE — ASSESSMENT & PLAN NOTE
-- Continue fosamax weekly  -- Reviewed basics of quantity versus quality  -- Reassuring she is not fracturing   -- RDA of calcium (6069-6771 mg /day in divided doses) and vitamin D 2000iu a day  discussed  -- Calcium from food sources preferred  -- Fall precautions emphasized  -- +weight bearing exercise  -- Repeat DEXA scan in 2025

## 2024-08-19 ENCOUNTER — TELEPHONE (OUTPATIENT)
Dept: RESEARCH | Facility: HOSPITAL | Age: 79
End: 2024-08-19
Payer: MEDICARE

## 2024-08-21 ENCOUNTER — PATIENT MESSAGE (OUTPATIENT)
Dept: ENDOCRINOLOGY | Facility: CLINIC | Age: 79
End: 2024-08-21
Payer: MEDICARE

## 2024-08-21 DIAGNOSIS — E11.22 TYPE 2 DIABETES MELLITUS WITH STAGE 3A CHRONIC KIDNEY DISEASE, WITH LONG-TERM CURRENT USE OF INSULIN: ICD-10-CM

## 2024-08-21 DIAGNOSIS — Z79.4 TYPE 2 DIABETES MELLITUS WITH STAGE 3A CHRONIC KIDNEY DISEASE, WITH LONG-TERM CURRENT USE OF INSULIN: ICD-10-CM

## 2024-08-21 DIAGNOSIS — N18.31 TYPE 2 DIABETES MELLITUS WITH STAGE 3A CHRONIC KIDNEY DISEASE, WITH LONG-TERM CURRENT USE OF INSULIN: ICD-10-CM

## 2024-08-21 RX ORDER — BLOOD-GLUCOSE SENSOR
EACH MISCELLANEOUS
Qty: 3 EACH | Refills: 3 | Status: SHIPPED | OUTPATIENT
Start: 2024-08-21

## 2024-08-27 ENCOUNTER — TELEPHONE (OUTPATIENT)
Dept: PHARMACY | Facility: CLINIC | Age: 79
End: 2024-08-27
Payer: MEDICARE

## 2024-08-27 ENCOUNTER — PATIENT MESSAGE (OUTPATIENT)
Dept: PHARMACY | Facility: CLINIC | Age: 79
End: 2024-08-27
Payer: MEDICARE

## 2024-08-27 DIAGNOSIS — M48.02 DEGENERATIVE CERVICAL SPINAL STENOSIS: ICD-10-CM

## 2024-08-27 DIAGNOSIS — G63 POLYNEUROPATHY ASSOCIATED WITH UNDERLYING DISEASE: ICD-10-CM

## 2024-08-27 NOTE — TELEPHONE ENCOUNTER
A Patient Assistance Application for Kaylee Romero  - MRN [id number]  was faxed to your office @ [phone]. Please have ANDREW Fox   review the application to ensure the prescription is correct. If correct, sign and fax the application back to the Pharmacy Patient Assistance Team @[phone].

## 2024-08-28 ENCOUNTER — PATIENT MESSAGE (OUTPATIENT)
Dept: OTHER | Facility: OTHER | Age: 79
End: 2024-08-28
Payer: MEDICARE

## 2024-08-28 RX ORDER — GABAPENTIN 100 MG/1
100 CAPSULE ORAL 2 TIMES DAILY
Qty: 90 CAPSULE | Refills: 2 | OUTPATIENT
Start: 2024-08-28

## 2024-08-30 NOTE — TELEPHONE ENCOUNTER
Patient inquired about seeing if I could assist her friends.  I explained to her that they must see an [facility] Provider and then the provider would send Pharmacy Patient Assistance a referral.  Also, all medications do not have a program.

## 2024-08-31 ENCOUNTER — PATIENT MESSAGE (OUTPATIENT)
Dept: PRIMARY CARE CLINIC | Facility: CLINIC | Age: 79
End: 2024-08-31
Payer: MEDICARE

## 2024-08-31 DIAGNOSIS — G63 POLYNEUROPATHY ASSOCIATED WITH UNDERLYING DISEASE: ICD-10-CM

## 2024-08-31 DIAGNOSIS — M48.02 DEGENERATIVE CERVICAL SPINAL STENOSIS: ICD-10-CM

## 2024-09-03 ENCOUNTER — OFFICE VISIT (OUTPATIENT)
Dept: URGENT CARE | Facility: CLINIC | Age: 79
End: 2024-09-03
Payer: MEDICARE

## 2024-09-03 VITALS
HEIGHT: 62 IN | BODY MASS INDEX: 43.24 KG/M2 | TEMPERATURE: 99 F | WEIGHT: 235 LBS | DIASTOLIC BLOOD PRESSURE: 72 MMHG | SYSTOLIC BLOOD PRESSURE: 117 MMHG | RESPIRATION RATE: 16 BRPM | OXYGEN SATURATION: 97 % | HEART RATE: 85 BPM

## 2024-09-03 DIAGNOSIS — Z20.822 CLOSE EXPOSURE TO COVID-19 VIRUS: ICD-10-CM

## 2024-09-03 DIAGNOSIS — R09.89 RUNNY NOSE: ICD-10-CM

## 2024-09-03 DIAGNOSIS — U07.1 COVID-19 VIRUS DETECTED: ICD-10-CM

## 2024-09-03 DIAGNOSIS — U07.1 COVID-19 VIRUS INFECTION: Primary | ICD-10-CM

## 2024-09-03 DIAGNOSIS — R05.9 COUGH, UNSPECIFIED TYPE: ICD-10-CM

## 2024-09-03 LAB
CTP QC/QA: YES
SARS-COV-2 AG RESP QL IA.RAPID: POSITIVE

## 2024-09-03 PROCEDURE — 87811 SARS-COV-2 COVID19 W/OPTIC: CPT | Mod: QW,S$GLB,, | Performed by: NURSE PRACTITIONER

## 2024-09-03 PROCEDURE — 99214 OFFICE O/P EST MOD 30 MIN: CPT | Mod: S$GLB,,, | Performed by: NURSE PRACTITIONER

## 2024-09-03 RX ORDER — GABAPENTIN 100 MG/1
100-200 CAPSULE ORAL NIGHTLY
Qty: 90 CAPSULE | Refills: 2 | OUTPATIENT
Start: 2024-09-03

## 2024-09-03 RX ORDER — IPRATROPIUM BROMIDE 42 UG/1
2 SPRAY, METERED NASAL 4 TIMES DAILY
Qty: 15 ML | Refills: 0 | Status: SHIPPED | OUTPATIENT
Start: 2024-09-03

## 2024-09-03 RX ORDER — BENZONATATE 200 MG/1
200 CAPSULE ORAL 3 TIMES DAILY PRN
Qty: 30 CAPSULE | Refills: 0 | Status: SHIPPED | OUTPATIENT
Start: 2024-09-03 | End: 2024-09-13

## 2024-09-03 NOTE — TELEPHONE ENCOUNTER
No care due was identified.  [facility] Embedded Care Due Messages. Reference number: [id number].   9/03/2024 10:31:33 AM CDT

## 2024-09-03 NOTE — TELEPHONE ENCOUNTER
Pt requesting a refill of her Gabapentin. Says that its helping to reduce her nerve sensitivity in her legs. Please advise.

## 2024-09-04 ENCOUNTER — PATIENT MESSAGE (OUTPATIENT)
Dept: PHARMACY | Facility: CLINIC | Age: 79
End: 2024-09-04
Payer: MEDICARE

## 2024-09-04 DIAGNOSIS — G63 POLYNEUROPATHY ASSOCIATED WITH UNDERLYING DISEASE: ICD-10-CM

## 2024-09-04 DIAGNOSIS — M48.02 DEGENERATIVE CERVICAL SPINAL STENOSIS: ICD-10-CM

## 2024-09-04 RX ORDER — GABAPENTIN 100 MG/1
100-200 CAPSULE ORAL NIGHTLY
Qty: 90 CAPSULE | Refills: 2 | Status: SHIPPED | OUTPATIENT
Start: 2024-09-04

## 2024-09-04 NOTE — PROGRESS NOTES
"Subjective:      Patient ID: Kaylee Romero is a 79 y.o. female.    Vitals:  height is 5' 2" (1.575 m) and weight is 106.6 kg (235 lb 0.2 oz). Her temporal temperature is 98.7 °F (37.1 °C). Her blood pressure is 117/72 and her pulse is 85. Her respiration is 16 and oxygen saturation is 97%.     Chief Complaint: Cough (With exposure to COVID-19 virus. )    Patient reports with a runny nose and cough that presented about 2-3 days ago, she also reports with being exposed to COVID-19. Patient reports with taking antibiotics to help combat her symptoms however, she reports with no relief.     Cough  This is a new problem. The current episode started yesterday. The problem has been gradually worsening. The problem occurs every few minutes. The cough is Non-productive. Associated symptoms include myalgias, nasal congestion, postnasal drip and rhinorrhea. Pertinent negatives include no chest pain, chills, ear congestion, ear pain, fever, headaches, heartburn, hemoptysis, rash, sore throat, shortness of breath, sweats, weight loss or wheezing. The symptoms are aggravated by lying down. Treatments tried: antibiotics. The treatment provided no relief. There is no history of asthma, bronchiectasis, bronchitis, COPD, emphysema, environmental allergies or pneumonia.       Constitution: Negative for chills and fever.   HENT:  Positive for congestion and postnasal drip. Negative for ear pain, sinus pain, sinus pressure and sore throat.    Cardiovascular:  Negative for chest pain and sob on exertion.   Respiratory:  Positive for cough. Negative for chest tightness, sputum production, bloody sputum, shortness of breath and wheezing.    Gastrointestinal:  Negative for heartburn.   Musculoskeletal:  Positive for muscle ache.   Skin:  Negative for rash.   Allergic/Immunologic: Negative for environmental allergies.   Neurological:  Negative for headaches.      Objective:     Physical Exam   Constitutional: She is oriented to person, " place, and time. She appears well-developed. She is cooperative.  Non-toxic appearance. She does not appear ill. No distress.   HENT:   Head: Normocephalic and atraumatic.   Ears:   Right Ear: Hearing, tympanic membrane, external ear and ear canal normal.   Left Ear: Hearing, tympanic membrane, external ear and ear canal normal.   Nose: Nose normal. No mucosal edema, rhinorrhea or nasal deformity. No epistaxis. Right sinus exhibits no maxillary sinus tenderness and no frontal sinus tenderness. Left sinus exhibits no maxillary sinus tenderness and no frontal sinus tenderness.   Mouth/Throat: Uvula is midline, oropharynx is clear and moist and mucous membranes are normal. No trismus in the jaw. Normal dentition. No uvula swelling. No oropharyngeal exudate, posterior oropharyngeal edema or posterior oropharyngeal erythema.   Eyes: Conjunctivae and lids are normal. No scleral icterus.   Neck: Trachea normal and phonation normal. Neck supple. No edema present. No erythema present. No neck rigidity present.   Cardiovascular: Normal rate, regular rhythm, normal heart sounds and normal pulses.   Pulmonary/Chest: Effort normal and breath sounds normal. No respiratory distress. She has no decreased breath sounds. She has no wheezes. She has no rhonchi.   Abdominal: Normal appearance.   Musculoskeletal: Normal range of motion.         General: No deformity. Normal range of motion.   Neurological: She is alert and oriented to person, place, and time. She exhibits normal muscle tone. Coordination normal.   Skin: Skin is warm, dry, intact, not diaphoretic and not pale.   Psychiatric: Her speech is normal and behavior is normal. Judgment and thought content normal.   Nursing note and vitals reviewed.      Assessment:     1. COVID-19 virus infection    2. Cough, unspecified type    3. Close exposure to COVID-19 virus    4. Runny nose        Plan:     Results for orders placed or performed in visit on 09/03/24   SARS Coronavirus 2  "Antigen, POCT Manual Read   Result Value Ref Range    SARS Coronavirus 2 Antigen Positive (A) Negative     Acceptable Yes      *Note: Due to a large number of results and/or encounters for the requested time period, some results have not been displayed. A complete set of results can be found in Results Review.     Chart and labs reviewed.  Hx of CKD and is on Plavix.  Discussed covid 19 and medications for covid 19.  States that she feels "great" but has runny nose mainly bothering her with cough.  We will treat supportively.    Here w/ her  in clinic who has recent diagnosis of covid 19 as well.    COVID-19 virus infection    Cough, unspecified type  -     SARS Coronavirus 2 Antigen, POCT Manual Read  -     benzonatate (TESSALON) 200 MG capsule; Take 1 capsule (200 mg total) by mouth 3 (three) times daily as needed for Cough.  Dispense: 30 capsule; Refill: 0    Close exposure to COVID-19 virus    Runny nose  -     ipratropium (ATROVENT) 42 mcg (0.06 %) nasal spray; 2 sprays by Each Nostril route 4 (four) times daily.  Dispense: 15 mL; Refill: 0      Patient Instructions   Please drink plenty of fluids.  Please get plenty of rest.  Please return here or go to the Emergency Department for any concerns or worsening of condition.  If you do not have Hypertension or any history of palpitations, it is ok to take over the counter Sudafed or Mucinex D or Allegra-D or Claritin-D or Zyrtec-D.  If you do take one of the above, it is ok to combine that with plain over the counter Mucinex or Allegra or Claritin or Zyrtec.  If for example you are taking Zyrtec -D, you can combine that with Mucinex, but not Mucinex-D.  If you are taking Mucinex-D, you can combine that with plain Allegra or Claritin or Zyrtec.   If you do have Hypertension or palpitations, it is safe to take Coricidin HBP for relief of sinus symptoms.  If not allergic, please take over the counter Tylenol (Acetaminophen) and/or Motrin " (Ibuprofen) as directed for control of pain and/or fever.  Please follow up with your primary care doctor or specialist as needed.    If you  smoke, please stop smoking.

## 2024-09-08 ENCOUNTER — PATIENT MESSAGE (OUTPATIENT)
Dept: CARDIOLOGY | Facility: CLINIC | Age: 79
End: 2024-09-08
Payer: MEDICARE

## 2024-09-09 NOTE — TELEPHONE ENCOUNTER
Spoke wTl mcnamara and recommended that she contacts her PCP to address the issue wTl Covid treatment. Meanwhile she also wanted to know about the oral surgery she needs. She had a referral for a provider who is out of network. I told her to call her insurance or go to their website to find out who she can see and contact us then after her visit to get clearance for the procedure. She verbalized understanding.

## 2024-09-10 ENCOUNTER — PATIENT MESSAGE (OUTPATIENT)
Dept: PHARMACY | Facility: CLINIC | Age: 79
End: 2024-09-10
Payer: MEDICARE

## 2024-09-11 ENCOUNTER — PATIENT MESSAGE (OUTPATIENT)
Dept: ENDOCRINOLOGY | Facility: CLINIC | Age: 79
End: 2024-09-11
Payer: MEDICARE

## 2024-09-17 ENCOUNTER — TELEPHONE (OUTPATIENT)
Dept: PODIATRY | Facility: CLINIC | Age: 79
End: 2024-09-17
Payer: MEDICARE

## 2024-09-17 NOTE — TELEPHONE ENCOUNTER
Psychiatric Consult





- .


Consult date: 03/13/18


Consult:: 





03/13/18 16:05


Identification: Patient is a 79-year-old female who was brought to the 

emergency room due to confusion and agitation at home.





Reason for Consult: Altered mental status





History of Present Illness: Patient's chart was reviewed, spoke with Dr. Cheatham 

her outpatient psychiatrist and her  was present as was her daughter.  

Patient's  states that last evening while eating dinner patient began 

getting increasingly confused, thrashing about getting up and down and was not 

making any sense.  Patient's  states that she been doing well prior to 

that in patient's medications are handled by [facility] other than her Xanax which she 

takes care of herself.  Patient was unable to give any history.  Patient's 

daughter and  were the main historians.  Patient is being seen by Dr. Cheatham as an outpatient for her depression and anxiety and has been stable for a 

number of years per her daughter.  In speaking with Dr. Cheatham the patient Xanax 

was decreased in September 2017 to a dose of 0.5 mg in the morning 1 mg in the 

afternoon and 0.5 mg at bedtime which the patient has been taking on a regular 

basis.  Patient is also been maintained on Zoloft 200 mg daily.


Patient's daughter reports that the patient had been doing well until the day 

of admission.  Patient's  reports that 3 days prior to admission the 

patient and her son got into an argument and she has been more anxious since 

that time and they have not seen him since that time.  I was unable to find out 

what the fight was between she and her son.


Patient has a long history with prior admissions to psychiatric units and has 

been treated for depression and anxiety for a number of years.





Past Psychiatric History: Patient has a number of prior inpatient admissions in 

the remote past and currently has been seeing Dr. Cheatham for several years and 

maintained on Zoloft 200 mg a day and Xanax 0.5 mg in the morning 1 mg in the 

afternoon and 0.5 mg at bedtime.





Past Medical/Surgical History: Patient has a history of hyperlipidemia, status 

post CVA, atrial fibrillation, coronary artery disease, hypothyroidism, 

hypertension, status post myocardial infarction





Social History: Patient has been  for 42 years and lives with her 

 and has 3 children.  Patient and her  live together and he 

reports no financial difficulties or conflict between them.





Substance Use History: Patient is not currently using any drugs or alcohol





Mental status: Appearance/Attitude: Patient is lying in a hospital bed, appears 

confused


Behavior: Patient is not exhibiting any psychomotor retardation or agitation


Speech/Language: Patient tries to respond to some questions but is confused and 

unable to do so, she speaks in a soft voice


Thought Process: Patient is confused, unable to respond to most questions


Thought Content: Patient does not appear to be responding to internal stimuli, 

there is no evidence of any paranoid or delusional ideation.  Patient does not 

appear to be having any visual hallucinations at this time.


Suicidal/Homicidal Ideation: Unable to assess


Sensorium/Cognition: Patient is alert and oriented to person and that she is in 

a hospital, further testing was not performed at this time 


Mood/Affect: Patient's mood is anxious and her affect is appropriate to her mood





Assessment: Patient appears to become delirious secondary to a urinary tract 

infection, she also has multiple medical problems.  Patient has been maintained 

on Zoloft and Xanax for her depression and anxiety for a number of years and in 

speaking with Dr. Cheatham they have been slowly trying to decrease her Xanax.  

Patient is confused, oriented only to person and situation she was unable to 

give me any history and most of the history was obtained from her either her 

daughter,  or Dr. Cheatham.  At this time the patient is cooperative with 

nursing staff, her daughter feels that she has improved from last evening when 

she was in the emergency room.





Diagnosis: Delirium secondary to medical condition; history of a major 

depressive disorder and anxiety disorder





Plan: patient will continue on Zoloft 200 mg daily, will adjust her Xanax 

dosage to a scheduled dose and at the amount she has been on at home which is 0 

point 5 in the morning, 1 mg in the afternoon and 0.5 mg at bedtime to prevent 

any withdrawal.  Patient will continue to be followed while she is in the 

hospital and assess whether there needs to be any further changes to her 

medication. Staff contacted and rescheduled patient for Oct. 2nd at 9:15 am., with Dr. Gillis at the [facility] location.      Patient verbalized understanding.

## 2024-09-17 NOTE — TELEPHONE ENCOUNTER
----- Message from Mary Jane Dunn sent at 9/17/2024 10:56 AM CDT -----  Regarding: appt  Type:  Patient Returning Call      Name of who is calling:        What is request in detail:pt is requesting a call back from Gi in regards to todays appt. Patient and  are over Covid and do not want to have their appt for today cancelled. They need to come  today  because  their last appt was rescheduled        Can clinic reply by [facility]:no        What number to call back [facility] not [facility]:[phone]

## 2024-09-23 ENCOUNTER — PATIENT MESSAGE (OUTPATIENT)
Dept: ENDOCRINOLOGY | Facility: CLINIC | Age: 79
End: 2024-09-23
Payer: MEDICARE

## 2024-09-23 ENCOUNTER — PATIENT MESSAGE (OUTPATIENT)
Dept: PRIMARY CARE CLINIC | Facility: CLINIC | Age: 79
End: 2024-09-23
Payer: MEDICARE

## 2024-09-23 NOTE — TELEPHONE ENCOUNTER
LOV 7/11/24  Annual 1/2/24  RTC     Please see the patient's message. The patient is scheduled to have a wisdom tooth removed and she is seeking medical advice regarding her fosamax.

## 2024-09-30 ENCOUNTER — TELEPHONE (OUTPATIENT)
Dept: ENDOCRINOLOGY | Facility: CLINIC | Age: 79
End: 2024-09-30
Payer: MEDICARE

## 2024-10-02 ENCOUNTER — OFFICE VISIT (OUTPATIENT)
Dept: PODIATRY | Facility: CLINIC | Age: 79
End: 2024-10-02
Payer: MEDICARE

## 2024-10-02 VITALS
HEIGHT: 62 IN | WEIGHT: 235 LBS | HEART RATE: 96 BPM | SYSTOLIC BLOOD PRESSURE: 120 MMHG | DIASTOLIC BLOOD PRESSURE: 73 MMHG | BODY MASS INDEX: 43.24 KG/M2

## 2024-10-02 DIAGNOSIS — L84 CORN OR CALLUS: ICD-10-CM

## 2024-10-02 DIAGNOSIS — E11.49 TYPE II DIABETES MELLITUS WITH NEUROLOGICAL MANIFESTATIONS: Chronic | ICD-10-CM

## 2024-10-02 DIAGNOSIS — B35.1 DERMATOPHYTOSIS OF NAIL: Chronic | ICD-10-CM

## 2024-10-02 DIAGNOSIS — E11.9 ENCOUNTER FOR DIABETIC FOOT EXAM: Primary | ICD-10-CM

## 2024-10-02 PROCEDURE — 11721 DEBRIDE NAIL 6 OR MORE: CPT | Mod: 59,Q9,S$GLB, | Performed by: PODIATRIST

## 2024-10-02 PROCEDURE — 3072F LOW RISK FOR RETINOPATHY: CPT | Mod: CPTII,S$GLB,, | Performed by: PODIATRIST

## 2024-10-02 PROCEDURE — 1160F RVW MEDS BY RX/DR IN RCRD: CPT | Mod: CPTII,S$GLB,, | Performed by: PODIATRIST

## 2024-10-02 PROCEDURE — 99213 OFFICE O/P EST LOW 20 MIN: CPT | Mod: 25,S$GLB,, | Performed by: PODIATRIST

## 2024-10-02 PROCEDURE — 1101F PT FALLS ASSESS-DOCD LE1/YR: CPT | Mod: CPTII,S$GLB,, | Performed by: PODIATRIST

## 2024-10-02 PROCEDURE — 1159F MED LIST DOCD IN RCRD: CPT | Mod: CPTII,S$GLB,, | Performed by: PODIATRIST

## 2024-10-02 PROCEDURE — 3074F SYST BP LT 130 MM HG: CPT | Mod: CPTII,S$GLB,, | Performed by: PODIATRIST

## 2024-10-02 PROCEDURE — 3288F FALL RISK ASSESSMENT DOCD: CPT | Mod: CPTII,S$GLB,, | Performed by: PODIATRIST

## 2024-10-02 PROCEDURE — 1126F AMNT PAIN NOTED NONE PRSNT: CPT | Mod: CPTII,S$GLB,, | Performed by: PODIATRIST

## 2024-10-02 PROCEDURE — 99999 PR PBB SHADOW E&M-EST. PATIENT-LVL III: CPT | Mod: PBBFAC,,, | Performed by: PODIATRIST

## 2024-10-02 PROCEDURE — 11056 PARNG/CUTG B9 HYPRKR LES 2-4: CPT | Mod: Q9,S$GLB,, | Performed by: PODIATRIST

## 2024-10-02 PROCEDURE — 3078F DIAST BP <80 MM HG: CPT | Mod: CPTII,S$GLB,, | Performed by: PODIATRIST

## 2024-10-02 NOTE — PROGRESS NOTES
Chief Concern: Diabetic Foot Exam (Foot Exam/PCP Liz Roberts MD  07/11/24)      HPI:  Kaylee Romero is a 79 y.o. female presents for foot exam and care.       PCP:  Liz Roberts MD, Diabetic Foot Exam (Foot Exam/PCP Liz Roberts MD  07/11/24)       Patient Active Problem List   Diagnosis    Essential hypertension    Dyslipidemia, goal LDL below 70    Class 3 severe obesity due to excess calories with serious comorbidity and body mass index (BMI) of 40.0 to 44.9 in adult    KAMRAN on CPAP    History of total knee arthroplasty, left    Osteoporosis without current pathological fracture    History of TIA (transient ischemic attack)    Age-related osteoporosis without current pathological fracture    Vitamin D deficiency, unspecified    Type 2 diabetes mellitus with stage 3 chronic kidney disease, without long-term current use of insulin    Coronary 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 pectoris    S/P CABG (coronary artery bypass graft)    Shoulder pain    Chronic heart failure with preserved ejection fraction    Decreased range of motion of right knee    Gait, antalgic    Purpura    Degenerative cervical spinal stenosis    Cervical myelopathy    DDD (degenerative disc disease), cervical    Status post total knee replacement, right           Current Outpatient Medications on File Prior to Visit   Medication Sig Dispense Refill    acetaminophen (TYLENOL) 500 MG tablet Take 500 mg by mouth 2 (two) times daily as needed for Pain.      alendronate (FOSAMAX) 70 MG tablet Take 1 tablet (70 mg total) by mouth every 7 days. 12 tablet 2    amLODIPine (NORVASC) 10 MG tablet Take 1 tablet (10 mg total) by mouth once daily. 90 tablet 2    ammonium lactate (LAC-HYDRIN) 12 % lotion Apply topically as needed for Dry Skin. On the feet and toenails. 225 g 6    atorvastatin (LIPITOR) 80 MG tablet Take 1 tablet (80 mg total) by mouth once daily. 90 tablet 3    BD BOO 2ND  "GEN PEN NEEDLE 32 gauge x 5/32" Ndle To injection daily 100 each 8    blood sugar diagnostic Strp 1 strip by Misc.(Non-Drug; Combo Route) route once daily. 100 strip 3    blood-glucose meter,continuous (DEXCOM G7 ) Misc To use with sensor 1 each 0    blood-glucose sensor (DEXCOM G7 SENSOR) Areli To change every 10 days 3 each 3    carvediloL (COREG) 25 MG tablet TAKE 1 TABLET(25 MG) BY MOUTH TWICE DAILY WITH MEALS 180 tablet 2    clopidogreL (PLAVIX) 75 mg tablet Take 1 tablet (75 mg total) by mouth once daily. 90 tablet 3    cyclobenzaprine (FLEXERIL) 10 MG tablet Take 1/2 tablet by mouth three times a day as needed for muscle spasms 90 tablet 1    diclofenac sodium (VOLTAREN) 1 % Gel Apply 2 g topically 4 (four) times daily as needed. To painful area on the feet. 500 g 5    diphenoxylate-atropine 2.5-0.025 mg (LOMOTIL) 2.5-0.025 mg per tablet Take 1 tablet by mouth 4 (four) times daily as needed for Diarrhea. for diarrhea 30 tablet 3    dulaglutide (TRULICITY) 4.5 mg/0.5 mL pen injector Inject 4.5 mg into the skin every 7 days. 4 pen 37    ferrous sulfate (FEROSUL) 325 mg (65 mg iron) Tab tablet Take 1 tablet (325 mg total) by mouth once daily. 90 tablet 1    finerenone 10 mg Tab Take 1 tablet by mouth once daily. 30 tablet 11    furosemide (LASIX) 20 MG tablet TAKE 1 TABLET BY MOUTH ON SATURDAY, SUNDAY, MONDAY, WEDNESDAY, FRIDAY 90 tablet 3    gabapentin (NEURONTIN) 100 MG capsule Take 1-2 capsules (100-200 mg total) by mouth every evening. 90 capsule 2    glucagon (GVOKE HYPOPEN 2-PACK) 1 mg/0.2 mL AtIn Inject 1 Package into the skin as needed. 2 Syringe 0    irbesartan (AVAPRO) 300 MG tablet Take 1 tablet (300 mg total) by mouth every evening. 90 tablet 2    isosorbide mononitrate (IMDUR) 30 MG 24 hr tablet Take 2 tablets (60 mg total) by mouth once daily. 180 tablet 3    lancets Misc 1 lancet by Misc.(Non-Drug; Combo Route) route once daily. 100 each 3    multivitamin (THERAGRAN) per tablet Take 1 " "tablet by mouth once daily.      nitroGLYCERIN (NITROSTAT) 0.4 MG SL tablet Place 1 tablet (0.4 mg total) under the tongue every 5 (five) minutes as needed for Chest pain. 100 tablet 1    vitamin D (VITAMIN D3) 1000 units Tab Take 1,000 Units by mouth twice a week. Monday AND THURSDAYS ONLY      aspirin (ECOTRIN) 81 MG EC tablet Take 1 tablet (81 mg total) by mouth 2 (two) times a day. 60 tablet 0    glipiZIDE (GLUCOTROL) 5 MG tablet Take 2 tablets (10 mg total) by mouth 2 (two) times daily with meals. 360 tablet 3    ipratropium (ATROVENT) 42 mcg (0.06 %) nasal spray 2 sprays by Each Nostril route 4 (four) times daily. 15 mL 0     Current Facility-Administered Medications on File Prior to Visit   Medication Dose Route Frequency Provider Last Rate Last Admin    0.9%  NaCl infusion   Intravenous Continuous Doug De Anda MD             Review of patient's allergies indicates:   Allergen Reactions    Keflex [cephalexin] Other (See Comments)     Turns orange    Bactrim [sulfamethoxazole-trimethoprim]      Generic version  Sulfa makes her sick               Objective:        Vitals:    10/02/24 0854   BP: 120/73   Pulse: 96   Weight: 106.6 kg (235 lb 0.2 oz)   Height: 5' 2" (1.575 m)       Physical Exam  Constitutional:       Appearance: She is well-developed.   Cardiovascular:      Comments: DP and PT pulses 2/4 loreta.   Edema noted loreta.   Capillary refill time < 3 seconds to digits 1-5, loreta.   Absent hair growth      Musculoskeletal:         General: Deformity present. No tenderness. Normal range of motion.      Comments: HAV noted to loreta 1st MPJ. 5/5 strength to all LE muscle groups. No POP noted     Skin:     Capillary Refill: Capillary refill takes 2 to 3 seconds.      Findings: No erythema.      Comments: Toenails x 10 are elongated by 1-3mm, thickened by 1-3mm and mycotic with subungual debris.  Flaky skin on the soles.  No vesicles or redness.   Calluses sub 1st metatarsal head bilateral      Neurological:      " "Mental Status: She is alert and oriented to person, place, and time.      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  Annual diabetes foot exam.   Shoe inspection.     Maintain proper foot hygiene.   Continue wearing proper shoe gear, daily foot inspections, never walking without protective shoe gear, never putting sharp instruments to feet.    Routine Foot Care    Date/Time: 10/2/2024 9:15 AM    Performed by: Donna Gillis DPM  Authorized by: Donna Gillis DPM    Time out: Immediately prior to procedure a "time out" was called to verify the correct patient, procedure, equipment, support staff and site/side marked as required.    Consent Done?:  Yes (Verbal)  Hyperkeratotic Skin Lesions?: Yes    Number of trimmed lesions:  2  Location(s):  Left 1st Metatarsal Head and Right 1st Metatarsal Head    Nail Care Type:  Debride  Location(s): All  (Left 1st Toe, Left 3rd Toe, Left 2nd Toe, Left 4th Toe, Left 5th Toe, Right 1st Toe, Right 2nd Toe, Right 3rd Toe, Right 4th Toe and Right 5th Toe)  Patient tolerance:  Patient tolerated the procedure well with no immediate complications     With patient's permission, the toenails mentioned above were reduced and debrided using a nail nipper, removing offending nail and debris.   Utilizing a #15 scalpel, I trimmed the corns and calluses at the above mentioned location.      The patient will continue to monitor the areas daily, inspect the " feet, wear protective shoe gear when ambulatory, and moisturizer to maintain skin integrity.

## 2024-10-05 NOTE — PROGRESS NOTES
HPI    DLS: 5/26/2023    Pt here for Yearly Cataract and Diabetic Exam;  Pt states no eye pain or discomfort but having blurred vision.     Meds;  No GTTS    1. NS (nuclear sclerosis), bilateral   2. KAMRAN on CPAP   3. Dry eye syndrome, bilateral   4. Presbyopia     Last edited by Saniya Seymour on 10/7/2024  2:42 PM.            Assessment /Plan     For exam results, see Encounter Report.    Nuclear sclerotic cataract of both eyes    PVD (posterior vitreous detachment), both eyes    Type 2 diabetes mellitus without ophthalmic manifestations    KAMRAN on CPAP    Essential hypertension        This patient is the wife of a long standing glaucoma patient of mine   Want to continue with me for yearly checks       PVD (posterior vitreous detachment), bilateral  Retinal drusen of both eyes  Arcus senilis of both corneas  Type 2 diabetes mellitus without ophthalmic manifestations  Essential hypertension              No retinopathy, monitor yearly     NS (nuclear sclerosis), bilateral            visually significant at this time OD>OS     Consider cat sx OD first     KAMRAN on CPAP     Presbyopia              Rx specs    Pt C/O blurry vision and glare - OD>OS   Has been getting worse over the last year     Discussed options and pt would like to have phaco/IOL od first - but would like to wait and do it in January after the holidays   Pt is hyperopic with slight stigmatism ou   Discussed IOL options   Pt declines a mutifocal or toric IOL   Prefers a monofocal IOL - set for distance plano to -0.5     F/U for pre-op and IOL calcs and H&P ect

## 2024-10-07 ENCOUNTER — OFFICE VISIT (OUTPATIENT)
Dept: OPHTHALMOLOGY | Facility: CLINIC | Age: 79
End: 2024-10-07
Payer: MEDICARE

## 2024-10-07 DIAGNOSIS — I10 ESSENTIAL HYPERTENSION: ICD-10-CM

## 2024-10-07 DIAGNOSIS — H25.13 NUCLEAR SCLEROTIC CATARACT OF BOTH EYES: Primary | ICD-10-CM

## 2024-10-07 DIAGNOSIS — G47.33 OSA ON CPAP: ICD-10-CM

## 2024-10-07 DIAGNOSIS — E11.9 TYPE 2 DIABETES MELLITUS WITHOUT OPHTHALMIC MANIFESTATIONS: ICD-10-CM

## 2024-10-07 DIAGNOSIS — H43.813 PVD (POSTERIOR VITREOUS DETACHMENT), BOTH EYES: ICD-10-CM

## 2024-10-07 PROCEDURE — 1100F PTFALLS ASSESS-DOCD GE2>/YR: CPT | Mod: CPTII,S$GLB,, | Performed by: OPHTHALMOLOGY

## 2024-10-07 PROCEDURE — 99214 OFFICE O/P EST MOD 30 MIN: CPT | Mod: S$GLB,,, | Performed by: OPHTHALMOLOGY

## 2024-10-07 PROCEDURE — 99999 PR PBB SHADOW E&M-EST. PATIENT-LVL II: CPT | Mod: PBBFAC,,, | Performed by: OPHTHALMOLOGY

## 2024-10-07 PROCEDURE — 1126F AMNT PAIN NOTED NONE PRSNT: CPT | Mod: CPTII,S$GLB,, | Performed by: OPHTHALMOLOGY

## 2024-10-07 PROCEDURE — 1160F RVW MEDS BY RX/DR IN RCRD: CPT | Mod: CPTII,S$GLB,, | Performed by: OPHTHALMOLOGY

## 2024-10-07 PROCEDURE — 2023F DILAT RTA XM W/O RTNOPTHY: CPT | Mod: CPTII,S$GLB,, | Performed by: OPHTHALMOLOGY

## 2024-10-07 PROCEDURE — 3288F FALL RISK ASSESSMENT DOCD: CPT | Mod: CPTII,S$GLB,, | Performed by: OPHTHALMOLOGY

## 2024-10-07 PROCEDURE — 1159F MED LIST DOCD IN RCRD: CPT | Mod: CPTII,S$GLB,, | Performed by: OPHTHALMOLOGY

## 2024-10-10 ENCOUNTER — TELEPHONE (OUTPATIENT)
Dept: OPHTHALMOLOGY | Facility: CLINIC | Age: 79
End: 2024-10-10
Payer: MEDICARE

## 2024-10-10 DIAGNOSIS — H25.13 NUCLEAR SCLEROTIC CATARACT OF BOTH EYES: Primary | ICD-10-CM

## 2024-10-14 ENCOUNTER — LAB VISIT (OUTPATIENT)
Dept: LAB | Facility: HOSPITAL | Age: 79
End: 2024-10-14
Attending: NURSE PRACTITIONER
Payer: MEDICARE

## 2024-10-14 ENCOUNTER — TELEPHONE (OUTPATIENT)
Dept: CARDIOLOGY | Facility: CLINIC | Age: 79
End: 2024-10-14
Payer: MEDICARE

## 2024-10-14 DIAGNOSIS — R35.0 URINARY FREQUENCY: Primary | ICD-10-CM

## 2024-10-14 DIAGNOSIS — E11.22 TYPE 2 DIABETES MELLITUS WITH STAGE 3A CHRONIC KIDNEY DISEASE, WITHOUT LONG-TERM CURRENT USE OF INSULIN: Chronic | ICD-10-CM

## 2024-10-14 DIAGNOSIS — N18.31 TYPE 2 DIABETES MELLITUS WITH STAGE 3A CHRONIC KIDNEY DISEASE, WITHOUT LONG-TERM CURRENT USE OF INSULIN: Chronic | ICD-10-CM

## 2024-10-14 PROCEDURE — 82043 UR ALBUMIN QUANTITATIVE: CPT | Performed by: NURSE PRACTITIONER

## 2024-10-14 PROCEDURE — 82570 ASSAY OF URINE CREATININE: CPT | Performed by: NURSE PRACTITIONER

## 2024-10-15 ENCOUNTER — TELEPHONE (OUTPATIENT)
Dept: CARDIOLOGY | Facility: CLINIC | Age: 79
End: 2024-10-15
Payer: MEDICARE

## 2024-10-15 ENCOUNTER — TELEPHONE (OUTPATIENT)
Dept: NEPHROLOGY | Facility: CLINIC | Age: 79
End: 2024-10-15
Payer: MEDICARE

## 2024-10-15 LAB
ALBUMIN/CREAT UR: 207.3 UG/MG (ref 0–30)
CREAT UR-MCNC: 96 MG/DL (ref 15–325)
MICROALBUMIN UR DL<=1MG/L-MCNC: 199 UG/ML

## 2024-10-15 NOTE — TELEPHONE ENCOUNTER
----- Message from [facility] sent at 10/15/2024 11:50 AM CDT -----  Type:  Appointment Request     Name of Caller:pt  When is the first available appointment?No access  Symptoms:abnormal urine sample  Would the patient rather a call back or a response via [facility]? Call back  Best Call Back Number:[phone]   Additional Information: Pt had an abnormal urine analysis and is requesting to be seen as soon as possible.

## 2024-10-15 NOTE — TELEPHONE ENCOUNTER
Oral surgery services clearance form faxed to [phone] and [phone]. Email confirmation successful fax

## 2024-10-17 ENCOUNTER — TELEPHONE (OUTPATIENT)
Dept: NEPHROLOGY | Facility: CLINIC | Age: 79
End: 2024-10-17
Payer: MEDICARE

## 2024-10-17 DIAGNOSIS — N18.31 STAGE 3A CHRONIC KIDNEY DISEASE: Primary | ICD-10-CM

## 2024-10-17 NOTE — PROGRESS NOTES
Hospital Medicine     Daily Progress Note       SUBJECTIVE   Interval History:   Patient seen and examined.  Initially confused about location bu then cleared up and corrected himself.  Denies pain, shortness of breath.  No nausea.     OBJECTIVE      Vital signs in last 24 hours:  Temp:  [36.7 °C (98 °F)-36.8 °C (98.3 °F)] 36.7 °C (98 °F)  Heart Rate:  [66-99] 66  Resp:  [18-20] 20  BP: (138-186)/(72-94) 167/79    Intake/Output Summary (Last 24 hours) at 10/17/2024 1413  Last data filed at 10/17/2024 1315  Gross per 24 hour   Intake 907.63 ml   Output 700 ml   Net 207.63 ml       PHYSICAL EXAMINATION      Physical Exam  Constitutional: No distress.   HENT:   Head: Normocephalic and atraumatic.   Eyes: No scleral icterus.   Cardiovascular: S1, S2. Normal rate and regular rhythm. No m/r/g appreciated.    Pulmonary/Chest: CTAB. No r/r/w. Respirations unlabored. Right chest tube with serous fluid in tubing. No crepitus. No air leak.  Abdominal: Soft. Bowel sounds are normal. No tenderness or distension. Ostomy with pink stoma, bag filled with gas.  Musculoskeletal:  no edema.   Neurological: alert and oriented to person, place, and time. Forgetful at times.  Skin: Skin is warm and dry.  No rash on exposed skin.  Psychiatric: Calm, cooperative.       LINES, CATHETERS, DRAINS, AIRWAYS, AND WOUNDS   Lines, Drains, and Airways:  Wounds (agree with documentation and present on admission):  Peripheral IV (Adult) 10/14/24 Left Antecubital (Active)   Number of days: 3       Peripheral IV (Adult) 10/14/24 Anterior;Left;Proximal;Upper Arm (Active)   Number of days: 3       Chest Tube 1 Right 12 Fr (Active)   Number of days: 1       Colostomy Unknown LUQ (Active)   Number of days: 5319         Comments:    LABS / IMAGING / TELE      Labs  Results from last 7 days   Lab Units 10/17/24  0332 10/16/24  0316 10/15/24  0639   SODIUM mEQ/L 142 141 138   POTASSIUM mEQ/L 3.5 3.7 4.0   CHLORIDE mEQ/L 106 103 101   CO2 mEQ/L 30 32* 31  Subjective:       Patient ID: Kaylee Romero is a 77 y.o. female.    Chief Complaint: No chief complaint on file.    HPI     77 year old female with CAD s/p 2v CABG (LIMA-LAD and SVG-OM in 8/2019) with subsequent 90% stenosis of distal LIMA graft s/p PCI of high grade LCx and RCA lesions, HFpEF, HTN, HLD, DM2, CKD III, KAMRAN on CPAP, TIA, obesity here for follow-up. She underwent repeat PET in 2021 which showed ischemia in the territory of D1 and subsequent angiography revealed patent grafts and no new focal stenoses requiring intervention.    She was recently admitted with worsening angina and anti-anginal medications were increased. She has been having a rough week and taking more NTG and having increased shortness of breath. She thinks some of her symptoms may have been attributed to running errands and/or not being well hydrated on some hot days. They have cut back significantly on eating out, now 2-3 times per month. Still eating at Mexican restaurants and bringing home boiled crawfish.     She has not been able to exercise for the last 2 weeks but had begun increasing her exercise frequency using her floor bike and leg/arm lifts. She will be starting cardiac rehab.    Review of Systems   Constitutional: Negative for fever.   HENT: Negative for nosebleeds.    Eyes: Positive for visual disturbance.   Respiratory: Positive for shortness of breath.    Cardiovascular: Negative for leg swelling.   Gastrointestinal: Negative for blood in stool.   Genitourinary: Negative for hematuria.   Musculoskeletal: Positive for gait problem.   Skin: Positive for rash.   Neurological: Negative for syncope.   All other systems reviewed and are negative.      Objective:       Vitals:    06/30/22 1406   BP: 120/60   Pulse: 88       Physical Exam  Constitutional:       Appearance: She is well-developed. She is not diaphoretic.   HENT:      Head: Normocephalic and atraumatic.   Eyes:      Pupils: Pupils are equal, round, and  "  BUN mg/dL 17 25 21   CREATININE mg/dL 0.7 0.8 0.9   GLUCOSE mg/dL 109* 150* 254*   CALCIUM mg/dL 9.3 9.7 9.6     Results from last 7 days   Lab Units 10/17/24  0332 10/16/24  0316 10/15/24  0639   WBC K/uL 9.35 12.11* 11.71*   HEMOGLOBIN g/dL 13.1* 15.2 15.1   HEMATOCRIT % 40.5 46.5 45.7   PLATELETS K/uL 176 191 212           Imaging  X-RAY CHEST 1 VIEW [[id number]] Collected: 10/17/24 1313   Order Status: Completed Updated: 10/17/24 1319   Narrative:     CLINICAL HISTORY: S/p pigtail placement for effusion    COMPARISON: CT performed 10/14/2024, x-ray performed 10/13/2024    COMMENT:    TECHNIQUE: Single frontal view of the chest was performed.    Right pleural pigtail catheter terminates at the lung base. Fluid component of  the loculated pleural effusion has decreased. Some locules of gas in the right  basilar likely due to interval catheter placement, loculation and an element of  \"trapped lung\". Pleural fluid along the lateral upper and mid right hemithorax  remains unchanged. Associated right basilar atelectasis/infiltrate is improving.  Left lung grossly clear. A skin fold projects over the left hemithorax; there is  no left pneumothorax. No alveolar edema. Stable cardiomegaly.   Impression:     IMPRESSION:  Loculated right hydropneumothorax at the lung base as described above; fluid  component has decreased and aeration improved.     IR PLEURAL CATHETER PLACEMENT    Result Date: 10/16/2024  CLINICAL HISTORY:    88-year-old male with recurrent loculated right pleural effusion.     IMPRESSION:   Satisfactory 12 Latvian right pigtail chest tube placement. COMMENT: PROCEDURE:  Chest tube placement. ANESTHESIA:  Lidocaine 1% local. FLUORO TIME:  0.1 minutes. REFERENCE AIR KERMA: 0.2 mGy. CONTRAST:  None. MEDICATIONS:  None. DESCRIPTION OF PROCEDURE:    Written informed consent was obtained.  Ultrasound demonstrates complex, multiloculated right pleural effusion.  The patient was prepped and draped in the usual " reactive to light.   Neck:      Vascular: No carotid bruit or JVD.   Cardiovascular:      Rate and Rhythm: Normal rate and regular rhythm.      Heart sounds: Heart sounds not distant. Murmur (Grade II/VI systolic murmur loudest at RUSB) heard.     No friction rub. No gallop. No S3 or S4 sounds.      Comments: Sternotomy, healed  Pulmonary:      Effort: Pulmonary effort is normal. No respiratory distress.      Breath sounds: Normal breath sounds. No wheezing.   Abdominal:      General: Bowel sounds are normal. There is no distension.      Palpations: Abdomen is soft.      Tenderness: There is no abdominal tenderness.   Musculoskeletal:         General: No swelling.      Cervical back: Normal range of motion.   Skin:     General: Skin is warm.      Findings: No erythema.   Neurological:      Mental Status: She is alert and oriented to person, place, and time.   Psychiatric:         Behavior: Behavior normal.         Assessment:       1. Coronary 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 pectoris    2. Aortic atherosclerosis    3. Chronic heart failure with preserved ejection fraction    4. Essential hypertension    5. Dyslipidemia, goal LDL below 70    6. S/P CABG (coronary artery bypass graft)    7. S/P drug eluting coronary stent placement    8. Morbid obesity with body mass index (BMI) of 40.0 or higher    9. Type 2 diabetes mellitus with stage 3a chronic kidney disease, without long-term current use of insulin    10. KAMRAN on CPAP        Plan:       CAD s/p CABG s/p BEA, aortic atherosclerosis  Complicated history of CAD with residual ischemia on follow-up PET stress, however subsequent coronary angiography did not reveal any new focal stenosis to explain symptoms  Continue DAPT, high intensity statin, beta blocker, ARB, imdur  Mediterranean style diet information provided previously  Continue cardiac rehab    HFpEF  Class C, NYHA II-III  EF 60%  Counseled regarding daily weights and how to  increase lasix PRN, sodium and fluid restrictions  We discussed exercising and she will commit to using her floor bike at least 3-4 times per week for at least 5 minutes.    Mild aortic stenosis  Surveillance every 3-5 years    Essential hypertension  Well controlled on current therapy    Dyslipidemia, goal LDL below 70  LDL at goal  Continue statin    History of TIA (transient ischemic attack)  Continue secondary prevention measures as above    Stage 3a chronic kidney disease  Needs good BP/glycemic control    Type 2 diabetes mellitus with stage 3 chronic kidney disease and hypertension  Followed by PCP    Morbid obesity with body mass index (BMI) of 40.0 or higher  Discussed diet/lifestyle modification    KAMRAN on CPAP  Continue CPAP                       sterile manner.  Using real-time ultrasound guidance, a Yueh needle was inserted into the right pleural effusion.  Following serial dilation, a 12 Spanish pigtail chest tube was inserted.  Pleural fluid sample was collected and sent for requested laboratory analysis.  The catheter was secured with suture and connected to Pleur-evac drainage.  The patient tolerated the procedure well without immediate complication.     ULTRASOUND GUIDED NEEDLE PLACEMENT    Result Date: 10/16/2024  CLINICAL HISTORY:    88-year-old male with recurrent loculated right pleural effusion.     IMPRESSION:   Satisfactory 12 Spanish right pigtail chest tube placement. COMMENT: PROCEDURE:  Chest tube placement. ANESTHESIA:  Lidocaine 1% local. FLUORO TIME:  0.1 minutes. REFERENCE AIR KERMA: 0.2 mGy. CONTRAST:  None. MEDICATIONS:  None. DESCRIPTION OF PROCEDURE:    Written informed consent was obtained.  Ultrasound demonstrates complex, multiloculated right pleural effusion.  The patient was prepped and draped in the usual sterile manner.  Using real-time ultrasound guidance, a Yueh needle was inserted into the right pleural effusion.  Following serial dilation, a 12 Spanish pigtail chest tube was inserted.  Pleural fluid sample was collected and sent for requested laboratory analysis.  The catheter was secured with suture and connected to Pleur-evac drainage.  The patient tolerated the procedure well without immediate complication.        ECG/Telemetry  I have independently reviewed the telemetry. No events for the last 24 hours.    ASSESSMENT AND PLAN      * PE (pulmonary thromboembolism) (CMS/[facility])  Overview  LLE DVT/PE in 2019-> Xarelto for 6 months then changed to prophylactic dose-> DVT 2020    Assessment & Plan  - on admission reports mild central chest pain x 2-3 days  - no SOB  - CTA chest - Left upper and lower lobe pulmonary emboli.  - Patient reports stopping Xarelto 2 days ago after listening to some one talk about Xarelto and  "Metoprolol interaction. On Med Dispense List he hasn't had any Xarelto fills since July.  He denies difficulty with cost.    P:  - continue heparin gtt  - eventual transition to Xarelto once we are sure there are no procedures  - needs better follow up    Pleural effusion on right  Assessment & Plan  - h/o recurrent pleural effusion requiring thoracentesis. S/p \"Right VATS pleural biopsy, insertion of Pleurx catheter \" by Dr. Sergey Dennis on 7/8/24. Pathology was benign. Catheter removed in office 8/9/24 after minimal drainage.  Patient states catheter had been removed 2 months ago  - He was admitted to [facility] in August 2024 for fall. Looks like at the time had PET-CT which was negative for malignancy  - CT chest  -Loculated RIGHT pleural effusion with pleural thickening. Empyema not excluded.  - Pulse ox stable on room air. Chronic dyspnea    P:  - Thoracic surgery consulted appreciated.   - s/p IR placment of 12F pigtail catheter. Fluid is exudative by Light's criteria. Cultures and cytology pending  - management of chest tube per surgery - Patient to get 1/2 dose  tpa/dornase x 6 doses. No need for follow up imaging.  tube can come out when the tube puts out less than 200ml/day     SBO (small bowel obstruction) (CMS/HCC)  Assessment & Plan  - Abd pain around area of colostomy since Sunday night  - CT A/P - Dilated small bowel with transition point in the left midabdomen, concerning for bowel obstruction. 5.  Left lower quadrant colostomy with small bowel containing parastomal hernia.   - normal lactate at 1.2, WBC 10.87K  - No n/v    P:  - Surgery following  - SBFT 10/15 showed contrast in right colon ruling out high grade bowel obstruction, likely ileus vs pSBO  - stool output in ostomy bag  - diet advanced to low residue  - pain control as needed, pain has been minimal    Grade I diastolic dysfunction  Assessment & Plan  ECHO 6/2024 : Left Ventricle: Normal ventricle size. Concentric left ventricular " hypertrophy. Normal systolic function. Estimated EF 65%. No regional wall motion abnormalities. Grade I diastolic dysfunction   He has Lasix on his med list but has not been filled since 5/16/2024 for 90 day supply  Reportedly taking Metoprolol XL until recently  Looks euvolemic    P:  - HOLD diuretics for now  - fluids d/c'd  - change lopressor to his outpatient toprol xl    Severe malnutrition (CMS/HCC)  Assessment & Plan  Patient meets criteria for severe malnutrition of chronic illness based on eating less than 75% energy needs for greater than 1 month, >5% weight loss x 3 months (lost 15% of his body weight) and moderate muscle loss with slight depression of temples.    Nutrition consult appreciated.    Medication management  Assessment & Plan  - Patient is not able to provide accurate list of medications  - He doesn't  have a PCP. Seems like he has been getting refills through cardiologist Dr. Campbell but they have noted he hasn't seen them in over a year.  - He states stopped Xarelto and Metoprolol couple days ago because he was told there is a drug-drug interaction.     P:  - care management following  He was given a list of [facility] PCPs    History of rectal cancer  Overview  T2 N1 M0 rectal cancer, neoadjuvant chemotherapy and  radiotherapy followed by surgery (APR), then had multiple  complications after the surgery,   Colostomy    Assessment & Plan  - unknown who he follows with        VTE Assessment: [facility]    VTE Prophylaxis:  Current anticoagulants:  heparin (porcine) bolus from bag 2,700-5,450 Units, intravenous, q6h PRN  heparin infusion in D5W 100 units/mL, intravenous, Titrated      Code Status: Full Code      Estimated Discharge Date: 10/21/2024   Disposition Planning: PT/OT rec SNF at discharge, patient agreeable     VALENCIA Adkins  10/17/2024

## 2024-10-17 NOTE — TELEPHONE ENCOUNTER
----- Message from Nurse Vela sent at 10/16/2024  5:02 PM CDT -----  Regarding: FW: Appt requested  Contact: [phone]    ----- Message -----  From: Laurel Joseph  Sent: 10/16/2024   4:14 PM CDT  To: [facility] Staff  Subject: Appt requested                                   Hi, pt called to request a call form the office to discuss scheduling an appt due to Lab results. Pls call the pt at  [phone].    Thank you.

## 2024-10-18 ENCOUNTER — LAB VISIT (OUTPATIENT)
Dept: LAB | Facility: HOSPITAL | Age: 79
End: 2024-10-18
Attending: INTERNAL MEDICINE
Payer: MEDICARE

## 2024-10-18 ENCOUNTER — TELEPHONE (OUTPATIENT)
Dept: CARDIOLOGY | Facility: CLINIC | Age: 79
End: 2024-10-18
Payer: MEDICARE

## 2024-10-18 DIAGNOSIS — N18.31 STAGE 3A CHRONIC KIDNEY DISEASE: ICD-10-CM

## 2024-10-18 DIAGNOSIS — N18.31 TYPE 2 DIABETES MELLITUS WITH STAGE 3A CHRONIC KIDNEY DISEASE, UNSPECIFIED WHETHER LONG TERM INSULIN USE: ICD-10-CM

## 2024-10-18 DIAGNOSIS — E11.22 TYPE 2 DIABETES MELLITUS WITH STAGE 3A CHRONIC KIDNEY DISEASE, UNSPECIFIED WHETHER LONG TERM INSULIN USE: ICD-10-CM

## 2024-10-18 LAB
ALBUMIN SERPL BCP-MCNC: 3.2 G/DL (ref 3.5–5.2)
ANION GAP SERPL CALC-SCNC: 12 MMOL/L (ref 8–16)
ANION GAP SERPL CALC-SCNC: 12 MMOL/L (ref 8–16)
BASOPHILS # BLD AUTO: 0.04 K/UL (ref 0–0.2)
BASOPHILS NFR BLD: 0.7 % (ref 0–1.9)
BUN SERPL-MCNC: 43 MG/DL (ref 8–23)
BUN SERPL-MCNC: 43 MG/DL (ref 8–23)
CALCIUM SERPL-MCNC: 9.6 MG/DL (ref 8.7–10.5)
CALCIUM SERPL-MCNC: 9.6 MG/DL (ref 8.7–10.5)
CHLORIDE SERPL-SCNC: 103 MMOL/L (ref 95–110)
CHLORIDE SERPL-SCNC: 103 MMOL/L (ref 95–110)
CO2 SERPL-SCNC: 21 MMOL/L (ref 23–29)
CO2 SERPL-SCNC: 21 MMOL/L (ref 23–29)
CREAT SERPL-MCNC: 1.5 MG/DL (ref 0.5–1.4)
CREAT SERPL-MCNC: 1.5 MG/DL (ref 0.5–1.4)
DIFFERENTIAL METHOD BLD: ABNORMAL
EOSINOPHIL # BLD AUTO: 0.2 K/UL (ref 0–0.5)
EOSINOPHIL NFR BLD: 3.8 % (ref 0–8)
ERYTHROCYTE [DISTWIDTH] IN BLOOD BY AUTOMATED COUNT: 15.7 % (ref 11.5–14.5)
EST. GFR  (NO RACE VARIABLE): 35.7 ML/MIN/1.73 M^2
EST. GFR  (NO RACE VARIABLE): 35.7 ML/MIN/1.73 M^2
FERRITIN SERPL-MCNC: 466 NG/ML (ref 20–300)
GLUCOSE SERPL-MCNC: 200 MG/DL (ref 70–110)
GLUCOSE SERPL-MCNC: 200 MG/DL (ref 70–110)
HCT VFR BLD AUTO: 31.8 % (ref 37–48.5)
HGB BLD-MCNC: 10 G/DL (ref 12–16)
IMM GRANULOCYTES # BLD AUTO: 0.01 K/UL (ref 0–0.04)
IMM GRANULOCYTES NFR BLD AUTO: 0.2 % (ref 0–0.5)
IRON SERPL-MCNC: 28 UG/DL (ref 30–160)
LYMPHOCYTES # BLD AUTO: 1 K/UL (ref 1–4.8)
LYMPHOCYTES NFR BLD: 17.2 % (ref 18–48)
MCH RBC QN AUTO: 26.9 PG (ref 27–31)
MCHC RBC AUTO-ENTMCNC: 31.4 G/DL (ref 32–36)
MCV RBC AUTO: 86 FL (ref 82–98)
MONOCYTES # BLD AUTO: 0.6 K/UL (ref 0.3–1)
MONOCYTES NFR BLD: 10.7 % (ref 4–15)
NEUTROPHILS # BLD AUTO: 4 K/UL (ref 1.8–7.7)
NEUTROPHILS NFR BLD: 67.4 % (ref 38–73)
NRBC BLD-RTO: 0 /100 WBC
PHOSPHATE SERPL-MCNC: 3.7 MG/DL (ref 2.7–4.5)
PLATELET # BLD AUTO: 206 K/UL (ref 150–450)
PMV BLD AUTO: 11.6 FL (ref 9.2–12.9)
POTASSIUM SERPL-SCNC: 4.7 MMOL/L (ref 3.5–5.1)
POTASSIUM SERPL-SCNC: 4.7 MMOL/L (ref 3.5–5.1)
RBC # BLD AUTO: 3.72 M/UL (ref 4–5.4)
SATURATED IRON: 11 % (ref 20–50)
SODIUM SERPL-SCNC: 136 MMOL/L (ref 136–145)
SODIUM SERPL-SCNC: 136 MMOL/L (ref 136–145)
TOTAL IRON BINDING CAPACITY: 256 UG/DL (ref 250–450)
TRANSFERRIN SERPL-MCNC: 173 MG/DL (ref 200–375)
WBC # BLD AUTO: 5.99 K/UL (ref 3.9–12.7)

## 2024-10-18 PROCEDURE — 84466 ASSAY OF TRANSFERRIN: CPT | Performed by: INTERNAL MEDICINE

## 2024-10-18 PROCEDURE — 85025 COMPLETE CBC W/AUTO DIFF WBC: CPT | Performed by: INTERNAL MEDICINE

## 2024-10-18 PROCEDURE — 80069 RENAL FUNCTION PANEL: CPT | Performed by: INTERNAL MEDICINE

## 2024-10-18 PROCEDURE — 82728 ASSAY OF FERRITIN: CPT | Performed by: INTERNAL MEDICINE

## 2024-10-18 PROCEDURE — 36415 COLL VENOUS BLD VENIPUNCTURE: CPT | Mod: PN | Performed by: INTERNAL MEDICINE

## 2024-10-18 NOTE — TELEPHONE ENCOUNTER
----- Message from Glenn sent at 10/18/2024  2:05 PM CDT -----  Tia with Oral Surgery is calling to confirm if medical clearance was received. Can be reached at [phone].    Thanks

## 2024-10-18 NOTE — PROGRESS NOTES
HPI  This 79 y.o. y/o female with PMH of HTN, HLD, type 2 DM, HFpEF, morbid obesity, KAMRAN, CAD s/p CABG in 09/2019, CKD came in for CKD.    Renal history  Creatinine   Date Value Ref Range Status   10/18/2024 1.5 (H) 0.5 - 1.4 mg/dL Final   10/18/2024 1.5 (H) 0.5 - 1.4 mg/dL Final   08/09/2024 1.2 0.5 - 1.4 mg/dL Final   06/21/2024 1.2 0.5 - 1.4 mg/dL Final   04/09/2024 1.2 0.5 - 1.4 mg/dL Final   03/05/2024 1.4 0.5 - 1.4 mg/dL Final   01/24/2024 1.2 0.5 - 1.4 mg/dL Final   01/24/2024 1.2 0.5 - 1.4 mg/dL Final   01/03/2024 1.4 0.5 - 1.4 mg/dL Final   12/05/2023 0.9 0.5 - 1.4 mg/dL Final   Cr 1.5 10/18/2024  Baseline Cr 1.0-1.4 since 2019  Cr up to 2.4 in 2021, reason unknown, then down to 1.0-1.4  DOREEN Cr up to 2.0 christin CABG in 08/2019  Prot/Creat Ratio, Urine   Date Value Ref Range Status   06/21/2024 Unable to calculate 0.00 - 0.20 Final   03/05/2024 0.10 0.00 - 0.20 Final     Has been drinking 45-50 oz of fluid a day  Not taking NSAIDs  Finerenone was added in Aug 2024, but she held it on Oct 16 2024    HTN  BP this visit 112/77 mmHg  BP at home 110s  Current medication: amlodipine 10 mg, carvedilol 25 mg BID, irbesartan 300 mg daily, imdur 30 mg daily, lasix 20 mg 4 times a week  Has been compliant with low salt diet  Lost 2-3 Ibs lately    Type 2 DM  Lab Results   Component Value Date    HGBA1C 6.7 (H) 08/09/2024   Still on Trulicity once a week  Not on DM meds after started high protein diet    [facility] 04/2024  FINDINGS:  Right kidney: The right kidney measures 10.3 cm.  Mild cortical thinning.  Mild loss of corticomedullary distinction  Resistive index measures 0.8.  Normal perfusion.  No solid mass. No renal stone. No hydronephrosis.     Left kidney: The left kidney measures 10.4 cm.  Mild cortical thinning. Mild loss of corticomedullary distinction  Resistive index measures 0.79.  Normal perfusion. 4 mm nonobstructing left renal stone.  No solid mass.  No hydronephrosis.     The bladder is partially distended  without significant abnormality.     Splenic resistive index: 0.8.     Impression:     Bilateral medical renal disease.     Nonobstructing left renal stone.       Past Medical History:   Diagnosis Date    Age-related osteoporosis without current pathological fracture 2019    Anemia     Cataract     CKD (chronic kidney disease) stage 3, GFR 30-59 ml/min     Coronary artery disease     DDD (degenerative disc disease), cervical 3/27/2024    Dry mouth     History of TIA (transient ischemic attack) 2018    Hyperlipidemia 2014    Hypertension     Hypertensive heart disease with diastolic heart failure 2012    Morbid obesity with body mass index (BMI) of 40.0 or higher 2016    KAMRAN (obstructive sleep apnea)     KAMRAN on CPAP 2016    Osteoporosis without current pathological fracture 2018    Physical deconditioning 2018    Status post total left knee replacement 2017    Type 2 diabetes mellitus with stage 3 chronic kidney disease, without long-term current use of insulin 2019       Past Surgical History:   Procedure Laterality Date    ankle surgery (l) Left     APPENDECTOMY       SECTION      CORONARY ARTERY BYPASS GRAFT  09/2019    x 2    CORONARY ARTERY BYPASS GRAFT (CABG) N/A 2019    Procedure: CORONARY ARTERY BYPASS GRAFT (CABG)X3;  Surgeon: Peterson Ventura MD;  Location: [facility] OR [address] [place] [address];  Service: Cardiovascular;  Laterality: N/A;    CORONARY BYPASS GRAFT ANGIOGRAPHY  10/18/2021    Procedure: Bypass graft study;  Surgeon: Jamar Haddad MD;  Location: [facility] CATH LAB;  Service: Cardiology;;    DILATION AND CURETTAGE OF UTERUS      ENDOSCOPIC HARVEST OF VEIN Left 2019    Procedure: SURGICAL PROCUREMENT, VEIN, ENDOSCOPIC;  Surgeon: Peterson Ventura MD;  Location: [facility] OR [address] [place] [address];  Service: Cardiovascular;  Laterality: Left;  Vein [place] Start: 843; End: 1054   Vein Prep Start: 1055; End: 1145    JOINT REPLACEMENT Left     knee  replacement    KNEE ARTHROSCOPY W/ DEBRIDEMENT      KNEE SURGERY Left 05/01/2017    TKR    LEFT HEART CATHETERIZATION Left 07/09/2019    Procedure: Left heart cath;  Surgeon: Ronak Gibbons MD;  Location: [facility] CATH LAB;  Service: Cardiology;  Laterality: Left;    LEFT HEART CATHETERIZATION Left 10/18/2021    Procedure: Left heart cath;  Surgeon: Jamar Haddad MD;  Location: [facility] CATH LAB;  Service: Cardiology;  Laterality: Left;    TOTAL KNEE ARTHROPLASTY Right 3/9/2023    Procedure: ARTHROPLASTY, KNEE, TOTAL:RIGHT;  Surgeon: Peterson Sharma MD;  Location: [facility] OR [facility] FLR;  Service: Orthopedics;  Laterality: Right;    TRANSFORAMINAL EPIDURAL INJECTION OF STEROID Left 11/15/2023    Procedure: LUMBAR TRANSFORAMINAL LEFT L2/3 AND L3/4 DIRECT REFERRAL *PLAVIX CLEARANCE IN CHART*;  Surgeon: Frank Ken MD;  Location: [facility] PAIN MGT;  Service: Pain Management;  Laterality: Left;       Review of patient's allergies indicates:   Allergen Reactions    Bactrim [sulfamethoxazole-trimethoprim] Other (See Comments)     Generic version  Sulfa makes her sick    Keflex [cephalexin] Other (See Comments)     Turns orange         Social History     Socioeconomic History    Marital status:     Number of children: 1   Tobacco Use    Smoking status: Never     Passive exposure: Never    Smokeless tobacco: Never   Substance and Sexual Activity    Alcohol use: Yes     Alcohol/week: 1.0 standard drink of alcohol     Types: 1 Shots of liquor per week     Comment: rare    Drug use: No    Sexual activity: Yes     Partners: Male     Birth control/protection: Post-menopausal   Social History Narrative     with a son living locally.  at [employer], Retired 5/18.     Social Drivers of Health     Financial Resource Strain: Low Risk  (1/9/2024)    Overall Financial Resource Strain (CARDIA)     Difficulty of Paying Living Expenses: Not hard at all   Food Insecurity: No Food Insecurity (1/9/2024)  "   Hunger Vital Sign     Worried About Running Out of Food in the Last Year: Never true     Ran Out of Food in the Last Year: Never true   Transportation Needs: No Transportation Needs (1/9/2024)    PRAPARE - Transportation     Lack of Transportation (Medical): No     Lack of Transportation (Non-Medical): No   Physical Activity: Sufficiently Active (1/9/2024)    Exercise Vital Sign     Days of Exercise per Week: 4 days     Minutes of Exercise per Session: 60 min   Stress: No Stress Concern Present (1/9/2024)    [place] citizen [place] [employer] - Occupational Stress Questionnaire     Feeling of Stress : Not at all   Housing Stability: Low Risk  (1/9/2024)    Housing Stability Vital Sign     Unable to Pay for Housing in the Last Year: No     Number of Places Lived in the Last Year: 1     Unstable Housing in the Last Year: No       Family History   Problem Relation Name Age of Onset    Heart attack Mother      Heart disease Father      Stroke Father      Heart failure Father      Diabetes Father      Arrhythmia Father      No Known Problems Brother      Stroke Paternal Grandfather      No Known Problems Son      Breast cancer Neg Hx      Colon cancer Neg Hx      Ovarian cancer Neg Hx      Amblyopia Neg Hx      Blindness Neg Hx      Cancer Neg Hx      Cataracts Neg Hx      Glaucoma Neg Hx      Hypertension Neg Hx      Macular degeneration Neg Hx      Retinal detachment Neg Hx      Strabismus Neg Hx      Thyroid disease Neg Hx      Esophageal cancer Neg Hx         ROS negative except listed above    PHYSICAL EXAMINATION:  Blood pressure 112/77, pulse 92, resp. rate 18, height 5' 2" (1.575 m), weight 105.7 kg (233 lb 0.4 oz), last menstrual period 01/09/2001, SpO2 99%.  Constitutional - No acute distress  HEENT - Grossly normal  Neck - supple.   Cardiovascular - JVP 8 cm  Respiratory - Bilateral coarse breathing sound  Musculoskeletal - Peripheral edema no  Dermatologic/Skin - Skin warm and dry.  No rashes.  " "  Neurologic - No acute neurological deficit  Psychiatric - AAOx3    Assessment and Plan  1. Elevated serum creatinine  CKD Stage 3A:     Urine Protein Creatinine Ratios:    Prot/Creat Ratio, Urine   Date Value Ref Range Status   06/21/2024 Unable to calculate 0.00 - 0.20 Final   03/05/2024 0.10 0.00 - 0.20 Final   09/19/2023 Unable to calculate 0.00 - 0.20 Final         Acid-Base:   Lab Results   Component Value Date     10/18/2024     10/18/2024    K 4.7 10/18/2024    K 4.7 10/18/2024    CO2 21 (L) 10/18/2024    CO2 21 (L) 10/18/2024     -Counseled to avoid NSAIDs  -Counseled to drink 45 oz a day  -Will continue irbesartan, Trulicity  -Will hold finerenone due to the patient is worried about rising of her Cr and "foamy urine" based on our discussion    2. HTN: BP goal 130/80 mmHg  -Counseled for low salt diet  -Continue amlodipine 10 mg, carvedilol 25 mg BID, irbesartan 300 mg daily, imdur 30 mg daily, lasix 20 mg every other day    3. Bone mineral condition:   Lab Results   Component Value Date    PTH 55.4 06/21/2024    PTH 53.1 01/06/2022    CALCIUM 9.6 10/18/2024    CALCIUM 9.6 10/18/2024    PHOS 3.7 10/18/2024    PHOS 3.7 06/21/2024     Vit D, 25-Hydroxy   Date Value Ref Range Status   06/21/2024 47 30 - 96 ng/mL Final     Comment:     Vitamin D deficiency.........<10 ng/mL                              Vitamin D insufficiency......10-29 ng/mL       Vitamin D sufficiency........> or equal to 30 ng/mL  Vitamin D toxicity............>100 ng/mL        Will continue to monitor    4. Anemia:   Lab Results   Component Value Date    HGB 10.0 (L) 10/18/2024    FERRITIN 466 (H) 10/18/2024    UIBC 227 08/23/2005    IRON 28 (L) 10/18/2024    TRANSFERRIN 173 (L) 10/18/2024    TIBC 256 10/18/2024    FESATURATED 11 (L) 10/18/2024   Continue Ferrous 325 mg daily  Advised to discuss with the PCP regarding colonoscopy    5. Type 2 DM:  Last HbA1C   Lab Results   Component Value Date    HGBA1C 6.7 (H) 08/09/2024 "     Counseled the patient regarding the importance of sugar control to slow CKD progression     6. Lipid management:   Lab Results   Component Value Date    LDLCALC 40.2 (L) 08/09/2024   Continue statin.    7. HFpEF  Counseled the patient to watch and keep the weight stable as unstable fluid status can affect the kidney function.    8. Severe obesity  Counseled for weight loss since the weight is associated with CKD progression    ESRD planing when eGFR < 15     Follow up in 2 months

## 2024-10-21 ENCOUNTER — OFFICE VISIT (OUTPATIENT)
Dept: NEPHROLOGY | Facility: CLINIC | Age: 79
End: 2024-10-21
Payer: MEDICARE

## 2024-10-21 VITALS
RESPIRATION RATE: 18 BRPM | WEIGHT: 233 LBS | HEIGHT: 62 IN | DIASTOLIC BLOOD PRESSURE: 77 MMHG | HEART RATE: 92 BPM | SYSTOLIC BLOOD PRESSURE: 112 MMHG | OXYGEN SATURATION: 99 % | BODY MASS INDEX: 42.88 KG/M2

## 2024-10-21 DIAGNOSIS — N18.31 STAGE 3A CHRONIC KIDNEY DISEASE: Primary | ICD-10-CM

## 2024-10-21 DIAGNOSIS — I50.9 CONGESTIVE HEART FAILURE, UNSPECIFIED HF CHRONICITY, UNSPECIFIED HEART FAILURE TYPE: ICD-10-CM

## 2024-10-21 DIAGNOSIS — R79.89 ELEVATED SERUM CREATININE: ICD-10-CM

## 2024-10-21 DIAGNOSIS — N18.31 TYPE 2 DIABETES MELLITUS WITH STAGE 3A CHRONIC KIDNEY DISEASE, UNSPECIFIED WHETHER LONG TERM INSULIN USE: ICD-10-CM

## 2024-10-21 DIAGNOSIS — I10 HYPERTENSION, UNSPECIFIED TYPE: ICD-10-CM

## 2024-10-21 DIAGNOSIS — E11.22 TYPE 2 DIABETES MELLITUS WITH STAGE 3A CHRONIC KIDNEY DISEASE, UNSPECIFIED WHETHER LONG TERM INSULIN USE: ICD-10-CM

## 2024-10-21 DIAGNOSIS — E11.69 HYPERLIPIDEMIA ASSOCIATED WITH TYPE 2 DIABETES MELLITUS: ICD-10-CM

## 2024-10-21 DIAGNOSIS — E66.01 SEVERE OBESITY (BMI >= 40): ICD-10-CM

## 2024-10-21 DIAGNOSIS — E78.5 HYPERLIPIDEMIA ASSOCIATED WITH TYPE 2 DIABETES MELLITUS: ICD-10-CM

## 2024-10-21 PROCEDURE — 1160F RVW MEDS BY RX/DR IN RCRD: CPT | Mod: CPTII,S$GLB,, | Performed by: INTERNAL MEDICINE

## 2024-10-21 PROCEDURE — 1159F MED LIST DOCD IN RCRD: CPT | Mod: CPTII,S$GLB,, | Performed by: INTERNAL MEDICINE

## 2024-10-21 PROCEDURE — 99214 OFFICE O/P EST MOD 30 MIN: CPT | Mod: S$GLB,,, | Performed by: INTERNAL MEDICINE

## 2024-10-21 PROCEDURE — 1101F PT FALLS ASSESS-DOCD LE1/YR: CPT | Mod: CPTII,S$GLB,, | Performed by: INTERNAL MEDICINE

## 2024-10-21 PROCEDURE — 3288F FALL RISK ASSESSMENT DOCD: CPT | Mod: CPTII,S$GLB,, | Performed by: INTERNAL MEDICINE

## 2024-10-21 PROCEDURE — 1126F AMNT PAIN NOTED NONE PRSNT: CPT | Mod: CPTII,S$GLB,, | Performed by: INTERNAL MEDICINE

## 2024-10-21 PROCEDURE — 99999 PR PBB SHADOW E&M-EST. PATIENT-LVL III: CPT | Mod: PBBFAC,,, | Performed by: INTERNAL MEDICINE

## 2024-10-21 PROCEDURE — 3078F DIAST BP <80 MM HG: CPT | Mod: CPTII,S$GLB,, | Performed by: INTERNAL MEDICINE

## 2024-10-21 PROCEDURE — 3074F SYST BP LT 130 MM HG: CPT | Mod: CPTII,S$GLB,, | Performed by: INTERNAL MEDICINE

## 2024-10-21 NOTE — Clinical Note
Hi,  I saw her at the renal clinic. She experienced some urinary frequency after started finerenone and she is very worried about the mild increase creatinine (I did explain the mechanism of finerenone and this slight decline of kidney function is what we expected). Given above and she has been on irbesartan and trulicity (recently lost 2-3 Ibs), and typically our goal is to control UPCR < 0.3g, I am okay with stopping her finerenone. Will repeat lab in 3 weeks. Just want to keep you in the loop. Please let me know if there is any concern. Thank you.  Warm regards, Luis Alberto

## 2024-10-24 ENCOUNTER — OFFICE VISIT (OUTPATIENT)
Dept: OPHTHALMOLOGY | Facility: CLINIC | Age: 79
End: 2024-10-24
Payer: MEDICARE

## 2024-10-24 DIAGNOSIS — G47.33 OSA ON CPAP: ICD-10-CM

## 2024-10-24 DIAGNOSIS — E11.9 TYPE 2 DIABETES MELLITUS WITHOUT OPHTHALMIC MANIFESTATIONS: ICD-10-CM

## 2024-10-24 DIAGNOSIS — H43.813 PVD (POSTERIOR VITREOUS DETACHMENT), BOTH EYES: ICD-10-CM

## 2024-10-24 DIAGNOSIS — H25.11 NUCLEAR SCLEROTIC CATARACT OF RIGHT EYE: Primary | ICD-10-CM

## 2024-10-24 DIAGNOSIS — H25.011 CORTICAL SENILE CATARACT OF RIGHT EYE: ICD-10-CM

## 2024-10-24 DIAGNOSIS — H25.13 NUCLEAR SCLEROTIC CATARACT OF BOTH EYES: ICD-10-CM

## 2024-10-24 DIAGNOSIS — I10 ESSENTIAL HYPERTENSION: ICD-10-CM

## 2024-10-24 DIAGNOSIS — H57.89 ABNORMAL RED REFLEX OF EYE: ICD-10-CM

## 2024-10-24 PROBLEM — I35.0 NONRHEUMATIC AORTIC VALVE STENOSIS: Status: ACTIVE | Noted: 2024-10-24

## 2024-10-24 PROCEDURE — 99999 PR PBB SHADOW E&M-EST. PATIENT-LVL II: CPT | Mod: PBBFAC,,, | Performed by: OPHTHALMOLOGY

## 2024-10-24 NOTE — Clinical Note
Please do me two favors. One - change her from a local MAC to a general - pts request - very anxious  Two - call and let patient know that she needs to hold her once a week injection of trulicity . She needs to be off it for over a full week . If she takes it Sunday nighty (like most people do ) her last dose would be Oct 28th. She MUST not take it after Oct. 28th. In other words she MUST not take it on Nov 3rd - or they will have to cancel her surgery   Thanks CONCHA

## 2024-10-25 RX ORDER — TROPICAMIDE 10 MG/ML
1 SOLUTION/ DROPS OPHTHALMIC
OUTPATIENT
Start: 2024-10-25

## 2024-10-25 RX ORDER — MOXIFLOXACIN 5 MG/ML
1 SOLUTION/ DROPS OPHTHALMIC
OUTPATIENT
Start: 2024-10-25

## 2024-10-25 RX ORDER — PROPARACAINE HYDROCHLORIDE 5 MG/ML
1 SOLUTION/ DROPS OPHTHALMIC
OUTPATIENT
Start: 2024-10-25

## 2024-10-25 RX ORDER — PHENYLEPHRINE HYDROCHLORIDE 100 MG/ML
1 SOLUTION/ DROPS OPHTHALMIC
OUTPATIENT
Start: 2024-10-25

## 2024-10-25 RX ORDER — KETOROLAC TROMETHAMINE 5 MG/ML
1 SOLUTION OPHTHALMIC
OUTPATIENT
Start: 2024-10-25

## 2024-10-25 RX ORDER — SODIUM CHLORIDE 0.9 % (FLUSH) 0.9 %
10 SYRINGE (ML) INJECTION
Status: SHIPPED | OUTPATIENT
Start: 2024-10-25

## 2024-10-25 NOTE — H&P (VIEW-ONLY)
Subjective     Patient ID: Kaylee Romero is a 79 y.o. female.    Chief Complaint: Pre-op Exam    HPI  Review of Systems   Constitutional: Negative.    HENT: Negative.     Eyes:  Positive for visual disturbance.   Respiratory: Negative.     Cardiovascular: Negative.    Neurological: Negative.    Psychiatric/Behavioral: Negative.            Objective     Physical Exam  Constitutional:       Appearance: She is well-developed.   HENT:      Head: Normocephalic.   Eyes:      Comments: See eye exam   Cardiovascular:      Rate and Rhythm: Normal rate and regular rhythm.   Pulmonary:      Effort: Pulmonary effort is normal.   Neurological:      Mental Status: She is oriented to person, place, and time.            Assessment and Plan     1. Nuclear sclerotic cataract of right eye  -     IOL Master - OU - Both Eyes    2. Cortical senile cataract of right eye  -     IOL Master - OU - Both Eyes    3. Nuclear sclerotic cataract of both eyes  -     IOL Master - OU - Both Eyes    4. Abnormal red reflex of eye    5. Type 2 diabetes mellitus without ophthalmic manifestations    6. PVD (posterior vitreous detachment), both eyes    7. KAMRAN on CPAP    8. Essential hypertension        Phaco/IOL od - first eye - poor red reflex          No follow-ups on file.

## 2024-10-25 NOTE — PROGRESS NOTES
HPI    DLS: 10/07/2024    Patient states her vision has been getting progressively worse. Patient   denies flashes or floaters.     Meds:  No GTTS    1. NS (nuclear sclerosis), bilateral   2. KAMRAN on CPAP   3. Dry eye syndrome, bilateral   4. Presbyopia     Last edited by Lexis Rogers on 10/24/2024  1:43 PM.            Assessment /Plan     For exam results, see Encounter Report.    Nuclear sclerotic cataract of right eye    Cortical senile cataract of right eye    Abnormal red reflex of eye    Type 2 diabetes mellitus without ophthalmic manifestations    PVD (posterior vitreous detachment), both eyes    KAMRAN on CPAP    Essential hypertension        This patient is the wife of a long standing glaucoma patient of mine   Want to continue with me for yearly checks       PVD (posterior vitreous detachment), bilateral  Retinal drusen of both eyes  Arcus senilis of both corneas  Type 2 diabetes mellitus without ophthalmic manifestations  Essential hypertension              No retinopathy, monitor yearly     NS (nuclear sclerosis), bilateral            visually significant at this time OD>OS     Consider cat sx OD first     KAMRAN on CPAP     Presbyopia              Rx specs    Pt C/O blurry vision and glare - OD>OS   Has been getting worse over the last year   Visually sign OU -     Discussed options and pt would like to have phaco/IOL od first -   Pt is hyperopic with slight stigmatism ou   Discussed IOL options   Pt declines a mutifocal or toric IOL   Prefers a monofocal IOL - set for distance plano to -0.5     Pt is very anxious and does not want to be aware of anything that is going on. She is requesting general anesthesia if possible. She is very squeamish about her eyes and is afraid she will not be able to be still.     HOLD TRULICITY FOR > 1 WEEK BEFORE SURGERY - pt is aware that if she does not hold this that the surgery will have to be canceled and re-scheduled - because of a increase in anesthetic risk .      IOL calcs   OD  DIB00 24.0  MTA4U0 20.0    Risks and benefits discussed and consent signed.

## 2024-10-28 ENCOUNTER — TELEPHONE (OUTPATIENT)
Dept: CARDIOLOGY | Facility: CLINIC | Age: 79
End: 2024-10-28
Payer: MEDICARE

## 2024-10-30 ENCOUNTER — OFFICE VISIT (OUTPATIENT)
Dept: CARDIOLOGY | Facility: CLINIC | Age: 79
End: 2024-10-30
Payer: MEDICARE

## 2024-10-30 VITALS
WEIGHT: 236.13 LBS | HEIGHT: 62 IN | HEART RATE: 78 BPM | OXYGEN SATURATION: 99 % | BODY MASS INDEX: 43.45 KG/M2 | SYSTOLIC BLOOD PRESSURE: 118 MMHG | DIASTOLIC BLOOD PRESSURE: 60 MMHG

## 2024-10-30 DIAGNOSIS — Z95.1 S/P CABG (CORONARY ARTERY BYPASS GRAFT): ICD-10-CM

## 2024-10-30 DIAGNOSIS — I35.0 NONRHEUMATIC AORTIC VALVE STENOSIS: ICD-10-CM

## 2024-10-30 DIAGNOSIS — N18.31 TYPE 2 DIABETES MELLITUS WITH STAGE 3A CHRONIC KIDNEY DISEASE, WITHOUT LONG-TERM CURRENT USE OF INSULIN: Chronic | ICD-10-CM

## 2024-10-30 DIAGNOSIS — E66.01 CLASS 3 SEVERE OBESITY DUE TO EXCESS CALORIES WITH SERIOUS COMORBIDITY AND BODY MASS INDEX (BMI) OF 40.0 TO 44.9 IN ADULT: ICD-10-CM

## 2024-10-30 DIAGNOSIS — E78.5 HYPERLIPIDEMIA, UNSPECIFIED HYPERLIPIDEMIA TYPE: ICD-10-CM

## 2024-10-30 DIAGNOSIS — I10 ESSENTIAL HYPERTENSION: Chronic | ICD-10-CM

## 2024-10-30 DIAGNOSIS — I25.118 CORONARY ARTERY DISEASE OF NATIVE ARTERY OF NATIVE HEART WITH STABLE ANGINA PECTORIS: Primary | ICD-10-CM

## 2024-10-30 DIAGNOSIS — E66.813 CLASS 3 SEVERE OBESITY DUE TO EXCESS CALORIES WITH SERIOUS COMORBIDITY AND BODY MASS INDEX (BMI) OF 40.0 TO 44.9 IN ADULT: ICD-10-CM

## 2024-10-30 DIAGNOSIS — E11.22 TYPE 2 DIABETES MELLITUS WITH STAGE 3A CHRONIC KIDNEY DISEASE, WITHOUT LONG-TERM CURRENT USE OF INSULIN: Chronic | ICD-10-CM

## 2024-10-30 DIAGNOSIS — I50.32 CHRONIC HEART FAILURE WITH PRESERVED EJECTION FRACTION: ICD-10-CM

## 2024-10-30 DIAGNOSIS — N18.30 STAGE 3 CHRONIC KIDNEY DISEASE, UNSPECIFIED WHETHER STAGE 3A OR 3B CKD: ICD-10-CM

## 2024-10-30 PROCEDURE — 3074F SYST BP LT 130 MM HG: CPT | Mod: CPTII,S$GLB,, | Performed by: INTERNAL MEDICINE

## 2024-10-30 PROCEDURE — 3288F FALL RISK ASSESSMENT DOCD: CPT | Mod: CPTII,S$GLB,, | Performed by: INTERNAL MEDICINE

## 2024-10-30 PROCEDURE — 1100F PTFALLS ASSESS-DOCD GE2>/YR: CPT | Mod: CPTII,S$GLB,, | Performed by: INTERNAL MEDICINE

## 2024-10-30 PROCEDURE — 99214 OFFICE O/P EST MOD 30 MIN: CPT | Mod: S$GLB,,, | Performed by: INTERNAL MEDICINE

## 2024-10-30 PROCEDURE — 1159F MED LIST DOCD IN RCRD: CPT | Mod: CPTII,S$GLB,, | Performed by: INTERNAL MEDICINE

## 2024-10-30 PROCEDURE — 99999 PR PBB SHADOW E&M-EST. PATIENT-LVL V: CPT | Mod: PBBFAC,,, | Performed by: INTERNAL MEDICINE

## 2024-10-30 PROCEDURE — 1126F AMNT PAIN NOTED NONE PRSNT: CPT | Mod: CPTII,S$GLB,, | Performed by: INTERNAL MEDICINE

## 2024-10-30 PROCEDURE — 3078F DIAST BP <80 MM HG: CPT | Mod: CPTII,S$GLB,, | Performed by: INTERNAL MEDICINE

## 2024-11-01 ENCOUNTER — TELEPHONE (OUTPATIENT)
Dept: CARDIOLOGY | Facility: CLINIC | Age: 79
End: 2024-11-01
Payer: MEDICARE

## 2024-11-04 NOTE — PRE-PROCEDURE INSTRUCTIONS
PreOp Instructions given:   - Verbal medication information (what to hold and what to take)   - NPO guidelines   Patients should stop full meals at midnight, but they can consume sugar-free clear liquids up to 1.5 hours prior to scheduled Arrival time.  Clear liquids include Gatorade, water, Clear liquids do NOT include anything with pulp or food particles (such as chicken broth, ice cream, yogurt, Jello, etc.)   - Arrival place directions given; time to be given the day before procedure by the   Surgeon's Office [facility] [place]  - Bathing with antibacterial soap   - Don't wear any jewelry or bring any valuables AM of surgery   - No makeup or moisturizer to face   - No perfume/cologne, powder, lotions or aftershave   Pt. verbalized understanding.   Pt denies any h/o Anesthesia/Sedation complications or side effects.  Patient does not know arrival time.  Explained that this information comes from the surgeon's office and if they haven't heard from them by 2 or 3 pm to call the office.  Patient stated an understanding.

## 2024-11-05 ENCOUNTER — TELEPHONE (OUTPATIENT)
Dept: OPHTHALMOLOGY | Facility: CLINIC | Age: 79
End: 2024-11-05
Payer: MEDICARE

## 2024-11-06 ENCOUNTER — ANESTHESIA (OUTPATIENT)
Dept: SURGERY | Facility: HOSPITAL | Age: 79
End: 2024-11-06
Payer: MEDICARE

## 2024-11-06 ENCOUNTER — HOSPITAL ENCOUNTER (OUTPATIENT)
Facility: HOSPITAL | Age: 79
Discharge: HOME | End: 2024-11-06
Attending: OPHTHALMOLOGY | Admitting: OPHTHALMOLOGY
Payer: MEDICARE

## 2024-11-06 ENCOUNTER — ANESTHESIA EVENT (OUTPATIENT)
Dept: SURGERY | Facility: HOSPITAL | Age: 79
End: 2024-11-06
Payer: MEDICARE

## 2024-11-06 VITALS
SYSTOLIC BLOOD PRESSURE: 118 MMHG | OXYGEN SATURATION: 99 % | DIASTOLIC BLOOD PRESSURE: 63 MMHG | WEIGHT: 236 LBS | BODY MASS INDEX: 43.43 KG/M2 | HEIGHT: 62 IN | TEMPERATURE: 97 F | HEART RATE: 69 BPM | RESPIRATION RATE: 20 BRPM

## 2024-11-06 DIAGNOSIS — H25.011 CORTICAL SENILE CATARACT OF RIGHT EYE: ICD-10-CM

## 2024-11-06 DIAGNOSIS — H25.11 NUCLEAR SCLEROTIC CATARACT OF RIGHT EYE: Primary | ICD-10-CM

## 2024-11-06 LAB
POCT GLUCOSE: 174 MG/DL (ref 70–110)
POCT GLUCOSE: 209 MG/DL (ref 70–110)

## 2024-11-06 PROCEDURE — 27201423 OPTIME MED/SURG SUP & DEVICES STERILE SUPPLY: Performed by: OPHTHALMOLOGY

## 2024-11-06 PROCEDURE — 37000008 HC ANESTHESIA 1ST 15 MINUTES: Performed by: OPHTHALMOLOGY

## 2024-11-06 PROCEDURE — 25000003 PHARM REV CODE 250: Performed by: OPHTHALMOLOGY

## 2024-11-06 PROCEDURE — 37000009 HC ANESTHESIA EA ADD 15 MINS: Performed by: OPHTHALMOLOGY

## 2024-11-06 PROCEDURE — 71000045 HC DOSC ROUTINE RECOVERY EA ADD'L HR: Performed by: OPHTHALMOLOGY

## 2024-11-06 PROCEDURE — 36000707: Performed by: OPHTHALMOLOGY

## 2024-11-06 PROCEDURE — 63600175 PHARM REV CODE 636 W HCPCS: Performed by: OPHTHALMOLOGY

## 2024-11-06 PROCEDURE — 36000706: Performed by: OPHTHALMOLOGY

## 2024-11-06 PROCEDURE — 71000044 HC DOSC ROUTINE RECOVERY FIRST HOUR: Performed by: OPHTHALMOLOGY

## 2024-11-06 PROCEDURE — V2632 POST CHMBR INTRAOCULAR LENS: HCPCS | Performed by: OPHTHALMOLOGY

## 2024-11-06 PROCEDURE — 66982 XCAPSL CTRC RMVL CPLX WO ECP: CPT | Mod: RT,,, | Performed by: OPHTHALMOLOGY

## 2024-11-06 PROCEDURE — 82962 GLUCOSE BLOOD TEST: CPT | Performed by: OPHTHALMOLOGY

## 2024-11-06 PROCEDURE — 71000015 HC POSTOP RECOV 1ST HR: Performed by: OPHTHALMOLOGY

## 2024-11-06 PROCEDURE — 25000003 PHARM REV CODE 250

## 2024-11-06 PROCEDURE — 25000003 PHARM REV CODE 250: Performed by: STUDENT IN AN ORGANIZED HEALTH CARE EDUCATION/TRAINING PROGRAM

## 2024-11-06 PROCEDURE — 63600175 PHARM REV CODE 636 W HCPCS: Performed by: STUDENT IN AN ORGANIZED HEALTH CARE EDUCATION/TRAINING PROGRAM

## 2024-11-06 DEVICE — LENS EYHANCE +24.0D: Type: IMPLANTABLE DEVICE | Site: EYE | Status: FUNCTIONAL

## 2024-11-06 RX ORDER — MOXIFLOXACIN 5 MG/ML
1 SOLUTION/ DROPS OPHTHALMIC
Status: DISCONTINUED | OUTPATIENT
Start: 2024-11-06 | End: 2024-11-06 | Stop reason: HOSPADM

## 2024-11-06 RX ORDER — PHENYLEPHRINE HYDROCHLORIDE 100 MG/ML
1 SOLUTION/ DROPS OPHTHALMIC
Status: DISCONTINUED | OUTPATIENT
Start: 2024-11-06 | End: 2024-11-06 | Stop reason: HOSPADM

## 2024-11-06 RX ORDER — LIDOCAINE HYDROCHLORIDE 40 MG/ML
INJECTION, SOLUTION RETROBULBAR
Status: DISCONTINUED | OUTPATIENT
Start: 2024-11-06 | End: 2024-11-06 | Stop reason: HOSPADM

## 2024-11-06 RX ORDER — MOXIFLOXACIN 5 MG/ML
SOLUTION/ DROPS OPHTHALMIC
Status: DISCONTINUED | OUTPATIENT
Start: 2024-11-06 | End: 2024-11-06 | Stop reason: HOSPADM

## 2024-11-06 RX ORDER — PROPARACAINE HYDROCHLORIDE 5 MG/ML
1 SOLUTION/ DROPS OPHTHALMIC
Status: DISCONTINUED | OUTPATIENT
Start: 2024-11-06 | End: 2024-11-06 | Stop reason: HOSPADM

## 2024-11-06 RX ORDER — LIDOCAINE HYDROCHLORIDE 10 MG/ML
INJECTION, SOLUTION EPIDURAL; INFILTRATION; INTRACAUDAL; PERINEURAL
Status: DISCONTINUED | OUTPATIENT
Start: 2024-11-06 | End: 2024-11-06 | Stop reason: HOSPADM

## 2024-11-06 RX ORDER — KETOROLAC TROMETHAMINE 5 MG/ML
1 SOLUTION OPHTHALMIC
Status: DISCONTINUED | OUTPATIENT
Start: 2024-11-06 | End: 2024-11-06 | Stop reason: HOSPADM

## 2024-11-06 RX ORDER — LIDOCAINE HYDROCHLORIDE 20 MG/ML
JELLY TOPICAL
Status: DISCONTINUED
Start: 2024-11-06 | End: 2024-11-06 | Stop reason: WASHOUT

## 2024-11-06 RX ORDER — PROPOFOL 10 MG/ML
INJECTION, EMULSION INTRAVENOUS
Status: DISCONTINUED | OUTPATIENT
Start: 2024-11-06 | End: 2024-11-06

## 2024-11-06 RX ORDER — LIDOCAINE HYDROCHLORIDE 10 MG/ML
INJECTION, SOLUTION EPIDURAL; INFILTRATION; INTRACAUDAL; PERINEURAL
Status: DISCONTINUED
Start: 2024-11-06 | End: 2024-11-06 | Stop reason: HOSPADM

## 2024-11-06 RX ORDER — TROPICAMIDE 10 MG/ML
1 SOLUTION/ DROPS OPHTHALMIC
Status: DISCONTINUED | OUTPATIENT
Start: 2024-11-06 | End: 2024-11-06 | Stop reason: HOSPADM

## 2024-11-06 RX ORDER — LIDOCAINE HYDROCHLORIDE 20 MG/ML
JELLY TOPICAL
Status: DISCONTINUED | OUTPATIENT
Start: 2024-11-06 | End: 2024-11-06 | Stop reason: HOSPADM

## 2024-11-06 RX ORDER — PREDNISOLONE ACETATE 10 MG/ML
SUSPENSION/ DROPS OPHTHALMIC
Status: DISCONTINUED | OUTPATIENT
Start: 2024-11-06 | End: 2024-11-06 | Stop reason: HOSPADM

## 2024-11-06 RX ORDER — FENTANYL CITRATE 50 UG/ML
INJECTION, SOLUTION INTRAMUSCULAR; INTRAVENOUS
Status: DISCONTINUED | OUTPATIENT
Start: 2024-11-06 | End: 2024-11-06

## 2024-11-06 RX ORDER — PHENYLEPHRINE HYDROCHLORIDE 10 MG/ML
INJECTION INTRAVENOUS
Status: DISCONTINUED | OUTPATIENT
Start: 2024-11-06 | End: 2024-11-06

## 2024-11-06 RX ORDER — EPHEDRINE SULFATE 50 MG/ML
INJECTION, SOLUTION INTRAVENOUS
Status: DISCONTINUED | OUTPATIENT
Start: 2024-11-06 | End: 2024-11-06

## 2024-11-06 RX ORDER — PREDNISOLONE ACETATE 10 MG/ML
SUSPENSION/ DROPS OPHTHALMIC
Status: DISCONTINUED
Start: 2024-11-06 | End: 2024-11-06 | Stop reason: HOSPADM

## 2024-11-06 RX ORDER — ONDANSETRON HYDROCHLORIDE 2 MG/ML
INJECTION, SOLUTION INTRAVENOUS
Status: DISCONTINUED | OUTPATIENT
Start: 2024-11-06 | End: 2024-11-06

## 2024-11-06 RX ORDER — ACETAMINOPHEN 325 MG/1
650 TABLET ORAL EVERY 6 HOURS PRN
Status: DISCONTINUED | OUTPATIENT
Start: 2024-11-06 | End: 2024-11-06 | Stop reason: HOSPADM

## 2024-11-06 RX ORDER — LIDOCAINE HYDROCHLORIDE 40 MG/ML
INJECTION, SOLUTION RETROBULBAR
Status: DISCONTINUED
Start: 2024-11-06 | End: 2024-11-06 | Stop reason: HOSPADM

## 2024-11-06 RX ORDER — LIDOCAINE HYDROCHLORIDE 20 MG/ML
INJECTION INTRAVENOUS
Status: DISCONTINUED | OUTPATIENT
Start: 2024-11-06 | End: 2024-11-06

## 2024-11-06 RX ORDER — MOXIFLOXACIN 5 MG/ML
SOLUTION/ DROPS OPHTHALMIC
Status: DISCONTINUED
Start: 2024-11-06 | End: 2024-11-06 | Stop reason: WASHOUT

## 2024-11-06 RX ADMIN — PHENYLEPHRINE HYDROCHLORIDE 100 MCG: 10 INJECTION INTRAVENOUS at 01:11

## 2024-11-06 RX ADMIN — TROPICAMIDE 1 DROP: 10 SOLUTION/ DROPS OPHTHALMIC at 12:11

## 2024-11-06 RX ADMIN — PROPARACAINE HYDROCHLORIDE 1 DROP: 5 SOLUTION/ DROPS OPHTHALMIC at 12:11

## 2024-11-06 RX ADMIN — MOXIFLOXACIN OPHTHALMIC 1 DROP: 5 SOLUTION/ DROPS OPHTHALMIC at 12:11

## 2024-11-06 RX ADMIN — PHENYLEPHRINE HYDROCHLORIDE 1 DROP: 100 SOLUTION/ DROPS OPHTHALMIC at 12:11

## 2024-11-06 RX ADMIN — KETOROLAC TROMETHAMINE 1 DROP: 5 SOLUTION/ DROPS OPHTHALMIC at 12:11

## 2024-11-06 RX ADMIN — ONDANSETRON 4 MG: 2 INJECTION INTRAMUSCULAR; INTRAVENOUS at 01:11

## 2024-11-06 RX ADMIN — GLYCOPYRROLATE 0.2 MG: 0.2 INJECTION, SOLUTION INTRAMUSCULAR; INTRAVENOUS at 01:11

## 2024-11-06 RX ADMIN — EPHEDRINE SULFATE 15 MG: 50 INJECTION INTRAVENOUS at 01:11

## 2024-11-06 RX ADMIN — ACETAMINOPHEN 650 MG: 325 TABLET ORAL at 03:11

## 2024-11-06 RX ADMIN — PROPOFOL 150 MG: 10 INJECTION, EMULSION INTRAVENOUS at 12:11

## 2024-11-06 RX ADMIN — FENTANYL CITRATE 50 MCG: 50 INJECTION, SOLUTION INTRAMUSCULAR; INTRAVENOUS at 12:11

## 2024-11-06 RX ADMIN — PROPOFOL 50 MG: 10 INJECTION, EMULSION INTRAVENOUS at 01:11

## 2024-11-06 RX ADMIN — LIDOCAINE HYDROCHLORIDE 80 MG: 20 INJECTION INTRAVENOUS at 12:11

## 2024-11-06 RX ADMIN — EPHEDRINE SULFATE 10 MG: 50 INJECTION INTRAVENOUS at 01:11

## 2024-11-06 NOTE — ANESTHESIA PREPROCEDURE EVALUATION
[facility]  Anesthesia Pre-Operative Evaluation         Patient Name/: Kaylee Romero, 1945  MRN: [id number]    SUBJECTIVE:     Pre-operative evaluation for Procedure(s) (LRB):  PHACOEMULSIFICATION, CATARACT (Right)  INSERTION, IOL PROSTHESIS (Right)     2024    Kaylee Romero is a 79 y.o. female     Patient now presents for the above procedure(s).    ________________________________________  Results for orders placed during the hospital encounter of 22    Echo    Interpretation Summary  · The left ventricle is normal in size with concentric remodeling and normal systolic function.  · The estimated ejection fraction is 68%.  · Indeterminate left ventricular diastolic function.  · Normal right ventricular size with normal right ventricular systolic function.  · There is mild aortic valve stenosis.  · Aortic valve area is 2.18 cm2; peak velocity is 2.04 m/s; mean gradient is 11 mmHg.  · Intermediate central venous pressure (8 mmHg).  · The estimated PA systolic pressure is 27 mmHg.    ________________________________________    LDA:        Drips:       Patient Active Problem List   Diagnosis    Essential hypertension    Class 3 severe obesity due to excess calories with serious comorbidity and body mass index (BMI) of 40.0 to 44.9 in adult    KAMRAN on CPAP    Chronic kidney disease, stage III (moderate)    History of total knee arthroplasty, left    Osteoporosis without current pathological fracture    History of TIA (transient ischemic attack)    Age-related osteoporosis without current pathological fracture    Vitamin D deficiency, unspecified    Type 2 diabetes mellitus with stage 3 chronic kidney disease, without long-term current use of insulin    Coronary 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 pectoris    S/P CABG (coronary artery bypass graft)    Shoulder pain    Chronic heart failure with preserved ejection fraction    Decreased range of  motion of right knee    Gait, antalgic    Purpura    Degenerative cervical spinal stenosis    Cervical myelopathy    DDD (degenerative disc disease), cervical    Status post total knee replacement, right    Hyperlipidemia    Nonrheumatic aortic valve stenosis       Review of patient's allergies indicates:   Allergen Reactions    Bactrim [sulfamethoxazole-trimethoprim] Other (See Comments)     Generic version  Sulfa makes her sick    Keflex [cephalexin] Other (See Comments)     Turns orange       Current Inpatient Medications:    carbachoL        LIDOcaine (PF) 10 mg/ml (1%)        LIDOcaine (PF)        LIDOcaine HCl 2%        moxifloxacin        phenylephrine-ketorolac        prednisoLONE acetate        sod hyaluronate-sod chondroitin-sod hyaluronate        trypan blue           Current Facility-Administered Medications on File Prior to Encounter   Medication Dose Route Frequency Provider Last Rate Last Admin    0.9%  NaCl infusion   Intravenous Continuous Doug De Anda MD         Current Outpatient Medications on File Prior to Encounter   Medication Sig Dispense Refill    amLODIPine (NORVASC) 10 MG tablet Take 1 tablet (10 mg total) by mouth once daily. 90 tablet 2    aspirin (ECOTRIN) 81 MG EC tablet Take 1 tablet (81 mg total) by mouth 2 (two) times a day. (Patient taking differently: Take 81 mg by mouth once daily.) 60 tablet 0    atorvastatin (LIPITOR) 80 MG tablet Take 1 tablet (80 mg total) by mouth once daily. 90 tablet 3    carvediloL (COREG) 25 MG tablet TAKE 1 TABLET(25 MG) BY MOUTH TWICE DAILY WITH MEALS 180 tablet 2    diphenoxylate-atropine 2.5-0.025 mg (LOMOTIL) 2.5-0.025 mg per tablet Take 1 tablet by mouth 4 (four) times daily as needed for Diarrhea. for diarrhea 30 tablet 3    ferrous sulfate (FEROSUL) 325 mg (65 mg iron) Tab tablet Take 1 tablet (325 mg total) by mouth once daily. 90 tablet 1    furosemide (LASIX) 20 MG tablet TAKE 1 TABLET BY MOUTH ON SATURDAY, SUNDAY, MONDAY, WEDNESDAY, FRIDAY  "90 tablet 3    gabapentin (NEURONTIN) 100 MG capsule Take 1-2 capsules (100-200 mg total) by mouth every evening. 90 capsule 2    irbesartan (AVAPRO) 300 MG tablet Take 1 tablet (300 mg total) by mouth every evening. 90 tablet 2    isosorbide mononitrate (IMDUR) 30 MG 24 hr tablet Take 2 tablets (60 mg total) by mouth once daily. (Patient taking differently: Take 60 mg by mouth every evening.) 180 tablet 3    multivitamin (THERAGRAN) per tablet Take 1 tablet by mouth once daily.      vitamin D (VITAMIN D3) 1000 units Tab Take 1,000 Units by mouth twice a week. Monday AND THURSDAYS ONLY      acetaminophen (TYLENOL) 500 MG tablet Take 500 mg by mouth 2 (two) times daily as needed for Pain.      ammonium lactate (LAC-HYDRIN) 12 % lotion Apply topically as needed for Dry Skin. On the feet and toenails. 225 g 6    BD BOO 2ND GEN PEN NEEDLE 32 gauge x 5/32" Ndle To injection daily 100 each 8    blood sugar diagnostic Strp 1 strip by Misc.(Non-Drug; Combo Route) route once daily. 100 strip 3    blood-glucose meter,continuous (DEXCOM G7 ) Misc To use with sensor 1 each 0    blood-glucose sensor (DEXCOM G7 SENSOR) Areli To change every 10 days 3 each 3    clopidogreL (PLAVIX) 75 mg tablet Take 1 tablet (75 mg total) by mouth once daily. 90 tablet 3    cyclobenzaprine (FLEXERIL) 10 MG tablet Take 1/2 tablet by mouth three times a day as needed for muscle spasms 90 tablet 1    diclofenac sodium (VOLTAREN) 1 % Gel Apply 2 g topically 4 (four) times daily as needed. To painful area on the feet. 500 g 5    dulaglutide (TRULICITY) 4.5 mg/0.5 mL pen injector Inject 4.5 mg into the skin every 7 days. 4 pen 37    glipiZIDE (GLUCOTROL) 5 MG tablet Take 2 tablets (10 mg total) by mouth 2 (two) times daily with meals. 360 tablet 3    glucagon (GVOKE HYPOPEN 2-PACK) 1 mg/0.2 mL AtIn Inject 1 Package into the skin as needed. 2 Syringe 0    lancets Misc 1 lancet by Misc.(Non-Drug; Combo Route) route once daily. 100 each 3    " nitroGLYCERIN (NITROSTAT) 0.4 MG SL tablet Place 1 tablet (0.4 mg total) under the tongue every 5 (five) minutes as needed for Chest pain. 100 tablet 1       Past Surgical History:   Procedure Laterality Date    ankle surgery (l) Left     APPENDECTOMY       SECTION      CORONARY ARTERY BYPASS GRAFT  09/2019    x 2    CORONARY ARTERY BYPASS GRAFT (CABG) N/A 2019    Procedure: CORONARY ARTERY BYPASS GRAFT (CABG)X3;  Surgeon: Peterson Ventura MD;  Location: [address];  Service: Cardiovascular;  Laterality: N/A;    CORONARY BYPASS GRAFT ANGIOGRAPHY  10/18/2021    Procedure: Bypass graft study;  Surgeon: Jamar Haddad MD;  Location: [facility] CATH LAB;  Service: Cardiology;;    DILATION AND CURETTAGE OF UTERUS      ENDOSCOPIC HARVEST OF VEIN Left 2019    Procedure: SURGICAL PROCUREMENT, VEIN, ENDOSCOPIC;  Surgeon: ePterson Ventura MD;  Location: [address];  Service: Cardiovascular;  Laterality: Left;  Vein [place] Start: 0843; End: 1054   Vein Prep Start: 1055; End: 1145    JOINT REPLACEMENT Left     knee replacement    KNEE ARTHROSCOPY W/ DEBRIDEMENT      KNEE SURGERY Left 2017    TKR    LEFT HEART CATHETERIZATION Left 2019    Procedure: Left heart cath;  Surgeon: Ronak Gibbons MD;  Location: [facility] CATH LAB;  Service: Cardiology;  Laterality: Left;    LEFT HEART CATHETERIZATION Left 10/18/2021    Procedure: Left heart cath;  Surgeon: Jamar Haddad MD;  Location: [facility] CATH LAB;  Service: Cardiology;  Laterality: Left;    TOTAL KNEE ARTHROPLASTY Right 3/9/2023    Procedure: ARTHROPLASTY, KNEE, TOTAL:RIGHT;  Surgeon: Peterson Sharma MD;  Location: [address];  Service: Orthopedics;  Laterality: Right;    TRANSFORAMINAL EPIDURAL INJECTION OF STEROID Left 11/15/2023    Procedure: LUMBAR TRANSFORAMINAL LEFT L2/3 AND L3/4 DIRECT REFERRAL *PLAVIX CLEARANCE IN CHART*;  Surgeon: Frank Ken MD;  Location: [facility] PAIN MGT;  Service: Pain Management;   Laterality: Left;       Social History:  Tobacco Use: Low Risk  (11/6/2024)    Patient History     Smoking Tobacco Use: Never     Smokeless Tobacco Use: Never     Passive Exposure: Never       Alcohol Use: Not At Risk (1/9/2024)    AUDIT-C     Frequency of Alcohol Consumption: Monthly or less     Average Number of Drinks: 1 or 2     Frequency of Binge Drinking: Never       OBJECTIVE:     Vital Signs Range:  BMI Readings from Last 1 Encounters:   11/06/24 43.16 kg/m²       Temp:  [36.3 °C (97.3 °F)]   Pulse:  [77]   Resp:  [16]   BP: (147)/(68)   SpO2:  [98 %]        Significant Labs:        Component Value Date/Time    WBC 5.99 10/18/2024 1550    HGB 10.0 (L) 10/18/2024 1550    HCT 31.8 (L) 10/18/2024 1550    HCT 25 (L) 08/12/2019 1731     10/18/2024 1550     10/18/2024 1550     10/18/2024 1550    K 4.7 10/18/2024 1550    K 4.7 10/18/2024 1550     10/18/2024 1550     10/18/2024 1550    CO2 21 (L) 10/18/2024 1550    CO2 21 (L) 10/18/2024 1550     (H) 10/18/2024 1550     (H) 10/18/2024 1550    BUN 43 (H) 10/18/2024 1550    BUN 43 (H) 10/18/2024 1550    CREATININE 1.5 (H) 10/18/2024 1550    CREATININE 1.5 (H) 10/18/2024 1550    MG 1.8 06/21/2022 0501    PHOS 3.7 10/18/2024 1550    CALCIUM 9.6 10/18/2024 1550    CALCIUM 9.6 10/18/2024 1550    ALBUMIN 3.2 (L) 10/18/2024 1550    PROT 7.7 08/09/2024 1334    ALKPHOS 69 08/09/2024 1334    BILITOT 0.4 08/09/2024 1334    AST 17 08/09/2024 1334    ALT 16 08/09/2024 1334    INR 1.1 02/16/2023 1258    HGBA1C 6.7 (H) 08/09/2024 1334        Please see Results Review for additional labs.     Diagnostic Studies: No relevant studies.    EKG:   Results for orders placed or performed in visit on 06/13/24   IN OFFICE EKG 12-LEAD (to [place])    Collection Time: 06/13/24 12:40 PM   Result Value Ref Range    QRS Duration 72 ms    [facility] QTC Calculation 444 ms    Narrative    Test Reason : R07.9,    Vent. Rate : 098 BPM     Atrial Rate : 098 BPM      P-R Int : 248 ms          QRS Dur : 072 ms      QT Int : 348 ms       P-R-T Axes : 000 -22 038 degrees     QTc Int : 444 ms    Sinus rhythm with 1st degree A-V block  Otherwise normal ECG  When compared with ECG of 16-FEB-2023 12:45,  Premature supraventricular complexes are no longer Present  Confirmed by Lennie Barrera MD ([id number]) on 6/13/2024 2:37:23 PM    Referred By:             Confirmed By:Lennie Barrera MD       ECHO:  See subjective, if available.  The left ventricle is normal in size with concentric remodeling and normal systolic function.  The estimated ejection fraction is 68%.  Indeterminate left ventricular diastolic function.  Normal right ventricular size with normal right ventricular systolic function.  There is mild aortic valve stenosis.  Aortic valve area is 2.18 cm2; peak velocity is 2.04 m/s; mean gradient is 11 mmHg.  Intermediate central venous pressure (8 mmHg).  The estimated PA systolic pressure is 27 mmHg.    ASSESSMENT/PLAN:                                                                                                                11/06/2024  Kaylee Romero is a 79 y.o., female.      Pre-op Assessment          Review of Systems  Cardiovascular:     Hypertension   CAD   CABG/stent   Angina     hyperlipidemia                               Pulmonary:        Sleep Apnea, CPAP                Renal/:  Chronic Renal Disease, CKD                Musculoskeletal:  Arthritis               Endocrine:  Diabetes                  Anesthesia Plan  Type of Anesthesia, risks & benefits discussed:    Anesthesia Type: Gen ETT  Intra-op Monitoring Plan: Standard ASA Monitors  Induction:  IV  Informed Consent: Informed consent signed with the Patient and all parties understand the risks and agree with anesthesia plan.  All questions answered.   ASA Score: 3  Day of Surgery Review of History & Physical: H&P Update referred to the surgeon/provider.    Ready For Surgery From Anesthesia Perspective.      .

## 2024-11-06 NOTE — INTERVAL H&P NOTE
H&P has been reviewed, and the patient has been examined. I concur with the findings, and no changes have occurred since H&P was written. Proceed to OR as planned.    Jr Landa MD  [employer] Ophthalmology PGY-2

## 2024-11-06 NOTE — OP NOTE
DATE OF PROCEDURE: 11/6/2024    PREOPERATIVE DIAGNOSIS: mixed cortical and nuclear Cataract - poor red reflex / Nuclear sclerotic cataract of right eye OD.     POSTOPERATIVE DIAGNOSIS: mixed cortical and Cataract/ - poor red reflex / Nuclear sclerotic cataract of right eyeOD, status post procedure.     PROCEDURE PERFORMED: Cataract extraction with  trypan blue, and phacoemulsification, posterior   chamber intraocular lens placement.     SURGEON: Sylvia Dela Cruz M.D.     ASSISTANT: DEEPAK Boothe MD    COMPLICATIONS: None.     BLOOD LOSS: Less than 5 mL.     ANESTHESIA: General    IMPLANT DATA:   1. DIB00 , power 24.0 diopters, serial #    PROCEDURE IN DETAIL: After informed consent including risks, benefits,   alternatives, the patient was brought to the operating room table, placed in   supine position. General  anesthesia care was used throughout the entire   procedure. Once adequate anesthesia was confirmed, the patient was then prepped   and draped in usual sterile fashion for intraocular surgery. Wire speculum was   used to hold the eyelids apart and the procedure was initiated by making   a partial thickness corneal wound with a halley blade temporally.   A supersharp blade was then used to make a second entry into the eye via a paracentesis incision.  Intracameral lidocaine followed by trypan blue, followed by  Viscoat were placed in the anterior chamber.   A 2.4 mm blade was then used to make to complete the clear corneal   incision temporally. A cystotome was used to make a tear in   the anterior capsular flap, which was continued around with Utrata forceps for   continuous curvilinear capsulorrhexis. BSS on a Molina cannula was then used for   hydrodissection and hydrodelineation of lens. The lens was noted to spin   freely in the bag. Phacoemulsification handpiece was then used to remove the   lens in a divide-and-conquer manner. Irrigation aspiration handpiece removed   the remaining cortical  material. Provisc was placed in the eye followed by the   lens as mentioned above without any complications. Once the lens was in proper   position, irrigation aspiration handpiece was used to remove the remaining Provisc. The   wounds were then hydrated with BSS and noted to be watertight. The eye had a   good physiological pressure and the lid speculum was removed under the   microscope without any shallowing. The eye was then covered with a collagen   shield soaked in Pred Forte and Vigamox and turned over to Anesthesia in stable   condition after placement of patch and shield.

## 2024-11-06 NOTE — TRANSFER OF CARE
"Anesthesia Transfer of Care Note    Patient: Kaylee Romero    Procedure(s) Performed: Procedure(s) (LRB):  PHACOEMULSIFICATION, CATARACT (Right)  INSERTION, IOL PROSTHESIS (Right)    Patient location: PACU    Anesthesia Type: general    Transport from OR: Transported from OR on 6-10 L/min O2 by face mask with adequate spontaneous ventilation    Post pain: adequate analgesia    Post assessment: no apparent anesthetic complications and tolerated procedure well    Post vital signs: stable    Level of consciousness: awake and alert    Nausea/Vomiting: no nausea/vomiting    Complications: none    Transfer of care protocol was followed      Last vitals: Visit Vitals  BP (!) 120/58 (BP Location: Left arm, Patient Position: Sitting)   Pulse 86   Temp 36.3 °C (97.3 °F) (Temporal)   Resp 16   Ht 5' 2" (1.575 m)   Wt 107 kg (236 lb)   LMP 01/09/2001 (Approximate)   SpO2 98%   Breastfeeding No   BMI 43.16 kg/m²     "

## 2024-11-06 NOTE — DISCHARGE SUMMARY
[facility] - [facility] (1st Fl)  Discharge Note  Short Stay    Procedure(s) (LRB):  PHACOEMULSIFICATION, CATARACT (Right)  INSERTION, IOL PROSTHESIS (Right)      OUTCOME: Patient tolerated treatment/procedure well without complication and is now ready for discharge.    DISPOSITION: Home or Self Care    FINAL DIAGNOSIS:  Nuclear sclerotic cataract of right eye    FOLLOWUP: In clinic    DISCHARGE INSTRUCTIONS:  No discharge procedures on file.     TIME SPENT ON DISCHARGE: 10 minutes

## 2024-11-06 NOTE — ANESTHESIA PROCEDURE NOTES
Intubation    Date/Time: 11/6/2024 1:01 PM    Performed by: Rosy Calderón CRNA  Authorized by: Andrew Vincent MD    Intubation:     Induction:  Intravenous    Intubated:  Postinduction    Mask Ventilation:  Not attempted    Attempts:  1    Attempted By:  CRNA    Difficult Airway Encountered?: No      Complications:  None    Airway Device:  Supraglottic airway/LMA    Airway Device Size:  4.0    Style/Cuff Inflation:  Cuffed (inflated to minimal occlusive pressure)    Secured at:  The lips    Placement Verified By:  Capnometry    Complicating Factors:  None    Findings Post-Intubation:  BS equal bilateral and atraumatic/condition of teeth unchanged

## 2024-11-06 NOTE — PLAN OF CARE
Patient ambulated onto unit accompanied by spouse. Pre procedure completed. Perioperative period discussed, all questions and concerns addressed.

## 2024-11-06 NOTE — PLAN OF CARE
Pt AAO x 4. VSS. Melissa. PO fluid intake w/o difficulty. Denies n/v, pain. D/C instructions given to patient & spouse. Verbals understanding.

## 2024-11-07 ENCOUNTER — OFFICE VISIT (OUTPATIENT)
Dept: OPHTHALMOLOGY | Facility: CLINIC | Age: 79
End: 2024-11-07
Payer: MEDICARE

## 2024-11-07 DIAGNOSIS — H57.89 ABNORMAL RED REFLEX OF EYE: ICD-10-CM

## 2024-11-07 DIAGNOSIS — H43.813 PVD (POSTERIOR VITREOUS DETACHMENT), BOTH EYES: ICD-10-CM

## 2024-11-07 DIAGNOSIS — Z96.1 PSEUDOPHAKIA, RIGHT EYE: ICD-10-CM

## 2024-11-07 DIAGNOSIS — Z98.49 POSTOPERATIVE CARE FOR CATARACT: Primary | ICD-10-CM

## 2024-11-07 DIAGNOSIS — H25.012 CORTICAL SENILE CATARACT OF LEFT EYE: ICD-10-CM

## 2024-11-07 DIAGNOSIS — Z48.810 POSTOPERATIVE CARE FOR CATARACT: Primary | ICD-10-CM

## 2024-11-07 DIAGNOSIS — H25.12 NUCLEAR SCLEROTIC CATARACT OF LEFT EYE: ICD-10-CM

## 2024-11-07 DIAGNOSIS — E11.9 TYPE 2 DIABETES MELLITUS WITHOUT OPHTHALMIC MANIFESTATIONS: ICD-10-CM

## 2024-11-07 PROCEDURE — 1126F AMNT PAIN NOTED NONE PRSNT: CPT | Mod: CPTII,S$GLB,, | Performed by: OPHTHALMOLOGY

## 2024-11-07 PROCEDURE — 1159F MED LIST DOCD IN RCRD: CPT | Mod: CPTII,S$GLB,, | Performed by: OPHTHALMOLOGY

## 2024-11-07 PROCEDURE — 3288F FALL RISK ASSESSMENT DOCD: CPT | Mod: CPTII,S$GLB,, | Performed by: OPHTHALMOLOGY

## 2024-11-07 PROCEDURE — 99999 PR PBB SHADOW E&M-EST. PATIENT-LVL II: CPT | Mod: PBBFAC,,, | Performed by: OPHTHALMOLOGY

## 2024-11-07 PROCEDURE — 99024 POSTOP FOLLOW-UP VISIT: CPT | Mod: S$GLB,,, | Performed by: OPHTHALMOLOGY

## 2024-11-07 PROCEDURE — 1160F RVW MEDS BY RX/DR IN RCRD: CPT | Mod: CPTII,S$GLB,, | Performed by: OPHTHALMOLOGY

## 2024-11-07 PROCEDURE — 1101F PT FALLS ASSESS-DOCD LE1/YR: CPT | Mod: CPTII,S$GLB,, | Performed by: OPHTHALMOLOGY

## 2024-11-07 RX ORDER — MOXIFLOXACIN 5 MG/ML
1 SOLUTION/ DROPS OPHTHALMIC 4 TIMES DAILY
COMMUNITY
Start: 2024-11-07 | End: 2024-11-09 | Stop reason: SDUPTHER

## 2024-11-07 RX ORDER — PREDNISOLONE ACETATE 10 MG/ML
1 SUSPENSION/ DROPS OPHTHALMIC 4 TIMES DAILY
COMMUNITY
Start: 2024-11-07

## 2024-11-07 NOTE — ANESTHESIA POSTPROCEDURE EVALUATION
Anesthesia Post Evaluation    Patient: Kaylee Romero    Procedure(s) Performed: Procedure(s) (LRB):  PHACOEMULSIFICATION, CATARACT (Right)  INSERTION, IOL PROSTHESIS (Right)    Final Anesthesia Type: general      Patient location during evaluation: PACU  Patient participation: Yes- Able to Participate  Level of consciousness: awake and alert  Post-procedure vital signs: reviewed and stable  Pain management: adequate  Airway patency: patent    PONV status at discharge: No PONV  Anesthetic complications: no      Cardiovascular status: blood pressure returned to baseline  Respiratory status: unassisted  Hydration status: euvolemic  Follow-up not needed.              Vitals Value Taken Time   /63 11/06/24 1532   Temp 36.1 °C (97 °F) 11/06/24 1510   Pulse 68 11/06/24 1536   Resp 23 11/06/24 1535   SpO2 100 % 11/06/24 1536   Vitals shown include unfiled device data.      No case tracking events are documented in the log.      Pain/Edwin Score: Pain Rating Prior to Med Admin: 5 (11/6/2024  3:42 PM)  Pain Rating Post Med Admin: 0 (11/6/2024  3:42 PM)  Edwin Score: 9 (11/6/2024  3:42 PM)

## 2024-11-07 NOTE — PROGRESS NOTES
HPI     Post-op Evaluation            Comments: Pt states OD did okay through out the night but eye is scratchy   this morning          Comments        1 day po Phaco IOL OD 11/6/24    1. NS OS  2. PVD OU  3. Retinal Drusen OU  4. Type 2 DM no DR  5. KAMRAN (on CPAP)  6. Essential HTN  7. Presbyopia  8. PCIOL OD              Last edited by Dayana Delgado MA on 11/7/2024 12:11 PM.            Assessment /Plan     For exam results, see Encounter Report.    Postoperative care for cataract    Nuclear sclerotic cataract of left eye    Cortical senile cataract of left eye    Abnormal red reflex of eye    Type 2 diabetes mellitus without ophthalmic manifestations    PVD (posterior vitreous detachment), both eyes    Pseudophakia, right eye          This patient is the wife of a long standing glaucoma patient of mine   Want to continue with me for yearly checks       PVD (posterior vitreous detachment), bilateral  Retinal drusen of both eyes  Arcus senilis of both corneas  Type 2 diabetes mellitus without ophthalmic manifestations  Essential hypertension              No retinopathy, monitor yearly     NS (nuclear sclerosis), bilateral            visually significant at this time OD>OS     Consider cat sx OD first     KAMRAN on CPAP     Presbyopia              Rx specs    Pt C/O blurry vision and glare - OD>OS   Has been getting worse over the last year   Visually sign OU -     Discussed options and pt would like to have phaco/IOL od first -   Pt is hyperopic with slight stigmatism ou   Discussed IOL options   Pt declines a mutifocal or toric IOL   Prefers a monofocal IOL - set for distance plano to -0.5     Pt is very anxious and does not want to be aware of anything that is going on. She is requesting general anesthesia if possible. She is very squeamish about her eyes and is afraid she will not be able to be still.     HOLD TRULICITY FOR > 1 WEEK BEFORE SURGERY - pt is aware that if she does not hold this that the surgery will  have to be canceled and re-scheduled - because of a increase in anesthetic risk .     IOL calcs   OD  DIB00 24.0  MTA4U0 20.0    Pt is referring 2 patients to me   Beena Nicole - h/o poor result post CE elsewhere   ? Harriet Moraes ( I use to see this patients mother Ms Eugenia treviño - who has passed)     Phaco / IOL OD Date: 11/6/2024 - general anesthesia -   POD # 1 - phaco/IOL   Doing well - mild corneal edema   Begin Pred Acetate QID   Begin vigamox QID  Shield at night  Glasses day  No lifting, no bending, no eye rubbing  F/U 1 week, AR/MR ou

## 2024-11-09 ENCOUNTER — NURSE TRIAGE (OUTPATIENT)
Dept: ADMINISTRATIVE | Facility: CLINIC | Age: 79
End: 2024-11-09
Payer: MEDICARE

## 2024-11-09 RX ORDER — MOXIFLOXACIN 5 MG/ML
1 SOLUTION/ DROPS OPHTHALMIC 4 TIMES DAILY
Qty: 3 ML | Refills: 0 | Status: SHIPPED | OUTPATIENT
Start: 2024-11-09 | End: 2024-11-14

## 2024-11-09 NOTE — PROGRESS NOTES
Ophthalmology Plan of Care Note    Got call stating that patient lost her Moxifloxacin and wanted a new prescription. Re-order for Moxifloxacin QID OD sent to pharmacy of choice.    Jr Landa  [facility], PGY-2   11/09/2024, 4:08 PM

## 2024-11-09 NOTE — TELEPHONE ENCOUNTER
Pt reports she had cataract surgery on Wednesday. Pt reports she received 2 bottles of eye drops but she lost  Moxifloxacin eye drop bottle. Pt asking for new medication to be sent to pharmacy. Care advice to call surgeon now. On call ophthalmology, Jr Landa, contacted regarding pt. MD given pt information and verbalized he will send prescription to pharmacy. Pt made aware and verbalizes understanding.   Pt called on call department back stating that medication was not sent to pharmacy. Secure chat sent to on call ophthalmology. MD verbalized he just sent medication into pharmacy. Will contact pt. Pt contacted and made aware and verbalizes understanding.   Reason for Disposition   [1] Caller has URGENT question AND [2] triager unable to answer question    Additional Information   Negative: Sounds like a life-threatening emergency to the triager   Negative: [1] Widespread rash AND [2] bright red, sunburn-like   Negative: [1] SEVERE headache AND [2] after spinal (epidural) anesthesia   Negative: [1] Vomiting AND [2] persists > 4 hours   Negative: [1] Vomiting AND [2] abdomen looks much more swollen than usual   Negative: [1] Drinking very little AND [2] dehydration suspected (e.g., no urine > 12 hours, very dry mouth, very lightheaded)   Negative: Patient sounds very sick or weak to the triager   Negative: Sounds like a serious complication to the triager   Negative: Fever > 100.4 F (38.0 C)   Negative: [1] SEVERE post-op pain (e.g., excruciating, pain scale 8-10) AND [2] not controlled with pain medications    Protocols used: Post-Op Symptoms and [id number]

## 2024-11-10 NOTE — PROGRESS NOTES
HPI    PO 1 week  Phaco IOL OD 11/6/24    Pt states od doing well   No pain     1. NS OS  2. PVD OU  3. Retinal Drusen OU  4. Type 2 DM no DR  5. KAMRAN (on CPAP)  6. Essential HTN  7. Presbyopia  8. PCIOL OD    Eye meds  PA OD Q 4 HRS   Vigamox OD Q 4 HRS   Last edited by Chanda Israel MA on 11/14/2024  2:50 PM.            Assessment /Plan     For exam results, see Encounter Report.    Postoperative care for cataract    Nuclear sclerotic cataract of left eye    Cortical senile cataract of left eye    Abnormal red reflex of eye    Type 2 diabetes mellitus without ophthalmic manifestations    PVD (posterior vitreous detachment), both eyes    Pseudophakia, right eye    KAMRAN on CPAP    Essential hypertension        This patient is the wife of a long standing glaucoma patient of mine   Want to continue with me for yearly checks       PVD (posterior vitreous detachment), bilateral  Retinal drusen of both eyes  Arcus senilis of both corneas  Type 2 diabetes mellitus without ophthalmic manifestations  Essential hypertension              No retinopathy, monitor yearly     NS (nuclear sclerosis), bilateral            visually significant at this time OD>OS     Consider cat sx OD first     KAMRAN on CPAP     Presbyopia              Rx specs    Pt C/O blurry vision and glare - OD>OS   Has been getting worse over the last year   Visually sign OU -     Discussed options and pt would like to have phaco/IOL od first -   Pt is hyperopic with slight stigmatism ou   Discussed IOL options   Pt declines a mutifocal or toric IOL   Prefers a monofocal IOL - set for distance plano to -0.5     Pt is very anxious and does not want to be aware of anything that is going on. She is requesting general anesthesia if possible. She is very squeamish about her eyes and is afraid she will not be able to be still.     HOLD TRULICITY FOR > 1 WEEK BEFORE SURGERY - pt is aware that if she does not hold this that the surgery will have to be canceled and  re-scheduled - because of a increase in anesthetic risk .     IOL calcs   OD  DIB00 24.0  MTA4U0 20.0    Pt is referring 2 patients to me   Beena Nicole - h/o poor result post CE elsewhere   ? Harriet Moraes ( I use to see this patients mother Ms Eugenia treviño - who has passed)     Phaco / IOL OD Date: 11/6/2024 - general anesthesia -   POW  # 1 - phaco/IOL   Doing well - mild corneal edema   Begin Pred Acetate QID   Begin vigamox QID - stop   Shield at night - 1 more week   Glasses day  No lifting, no bending, no eye rubbing  F/U 4-5  week, AR/MR ou// OCT macula - moving line

## 2024-11-11 ENCOUNTER — LAB VISIT (OUTPATIENT)
Dept: LAB | Facility: HOSPITAL | Age: 79
End: 2024-11-11
Attending: INTERNAL MEDICINE
Payer: MEDICARE

## 2024-11-11 DIAGNOSIS — N18.31 STAGE 3A CHRONIC KIDNEY DISEASE: ICD-10-CM

## 2024-11-11 PROCEDURE — 85025 COMPLETE CBC W/AUTO DIFF WBC: CPT | Performed by: INTERNAL MEDICINE

## 2024-11-11 PROCEDURE — 36415 COLL VENOUS BLD VENIPUNCTURE: CPT | Mod: PN | Performed by: INTERNAL MEDICINE

## 2024-11-11 PROCEDURE — 80069 RENAL FUNCTION PANEL: CPT | Performed by: INTERNAL MEDICINE

## 2024-11-12 LAB
ALBUMIN SERPL BCP-MCNC: 3.7 G/DL (ref 3.5–5.2)
ANION GAP SERPL CALC-SCNC: 10 MMOL/L (ref 8–16)
BASOPHILS # BLD AUTO: 0.03 K/UL (ref 0–0.2)
BASOPHILS NFR BLD: 0.5 % (ref 0–1.9)
BUN SERPL-MCNC: 19 MG/DL (ref 8–23)
CALCIUM SERPL-MCNC: 10.3 MG/DL (ref 8.7–10.5)
CHLORIDE SERPL-SCNC: 103 MMOL/L (ref 95–110)
CO2 SERPL-SCNC: 25 MMOL/L (ref 23–29)
CREAT SERPL-MCNC: 1.3 MG/DL (ref 0.5–1.4)
DIFFERENTIAL METHOD BLD: ABNORMAL
EOSINOPHIL # BLD AUTO: 0.3 K/UL (ref 0–0.5)
EOSINOPHIL NFR BLD: 4.3 % (ref 0–8)
ERYTHROCYTE [DISTWIDTH] IN BLOOD BY AUTOMATED COUNT: 17.1 % (ref 11.5–14.5)
EST. GFR  (NO RACE VARIABLE): 42.4 ML/MIN/1.73 M^2
GLUCOSE SERPL-MCNC: 133 MG/DL (ref 70–110)
HCT VFR BLD AUTO: 34.6 % (ref 37–48.5)
HGB BLD-MCNC: 10.5 G/DL (ref 12–16)
IMM GRANULOCYTES # BLD AUTO: 0.02 K/UL (ref 0–0.04)
IMM GRANULOCYTES NFR BLD AUTO: 0.3 % (ref 0–0.5)
LYMPHOCYTES # BLD AUTO: 1 K/UL (ref 1–4.8)
LYMPHOCYTES NFR BLD: 17.2 % (ref 18–48)
MCH RBC QN AUTO: 27.3 PG (ref 27–31)
MCHC RBC AUTO-ENTMCNC: 30.3 G/DL (ref 32–36)
MCV RBC AUTO: 90 FL (ref 82–98)
MONOCYTES # BLD AUTO: 0.5 K/UL (ref 0.3–1)
MONOCYTES NFR BLD: 7.9 % (ref 4–15)
NEUTROPHILS # BLD AUTO: 4.1 K/UL (ref 1.8–7.7)
NEUTROPHILS NFR BLD: 69.8 % (ref 38–73)
NRBC BLD-RTO: 0 /100 WBC
PHOSPHATE SERPL-MCNC: 2.9 MG/DL (ref 2.7–4.5)
PLATELET # BLD AUTO: 227 K/UL (ref 150–450)
PMV BLD AUTO: 11.3 FL (ref 9.2–12.9)
POTASSIUM SERPL-SCNC: 4.2 MMOL/L (ref 3.5–5.1)
RBC # BLD AUTO: 3.84 M/UL (ref 4–5.4)
SODIUM SERPL-SCNC: 138 MMOL/L (ref 136–145)
WBC # BLD AUTO: 5.81 K/UL (ref 3.9–12.7)

## 2024-11-14 ENCOUNTER — OFFICE VISIT (OUTPATIENT)
Dept: OPHTHALMOLOGY | Facility: CLINIC | Age: 79
End: 2024-11-14
Payer: MEDICARE

## 2024-11-14 DIAGNOSIS — H43.813 PVD (POSTERIOR VITREOUS DETACHMENT), BOTH EYES: ICD-10-CM

## 2024-11-14 DIAGNOSIS — H25.12 NUCLEAR SCLEROTIC CATARACT OF LEFT EYE: ICD-10-CM

## 2024-11-14 DIAGNOSIS — H25.012 CORTICAL SENILE CATARACT OF LEFT EYE: ICD-10-CM

## 2024-11-14 DIAGNOSIS — E11.9 TYPE 2 DIABETES MELLITUS WITHOUT OPHTHALMIC MANIFESTATIONS: ICD-10-CM

## 2024-11-14 DIAGNOSIS — H57.89 ABNORMAL RED REFLEX OF EYE: ICD-10-CM

## 2024-11-14 DIAGNOSIS — Z96.1 PSEUDOPHAKIA, RIGHT EYE: ICD-10-CM

## 2024-11-14 DIAGNOSIS — Z98.49 POSTOPERATIVE CARE FOR CATARACT: Primary | ICD-10-CM

## 2024-11-14 DIAGNOSIS — G47.33 OSA ON CPAP: ICD-10-CM

## 2024-11-14 DIAGNOSIS — Z48.810 POSTOPERATIVE CARE FOR CATARACT: Primary | ICD-10-CM

## 2024-11-14 DIAGNOSIS — I10 ESSENTIAL HYPERTENSION: ICD-10-CM

## 2024-11-14 PROCEDURE — 1160F RVW MEDS BY RX/DR IN RCRD: CPT | Mod: CPTII,S$GLB,, | Performed by: OPHTHALMOLOGY

## 2024-11-14 PROCEDURE — 99999 PR PBB SHADOW E&M-EST. PATIENT-LVL III: CPT | Mod: PBBFAC,,, | Performed by: OPHTHALMOLOGY

## 2024-11-14 PROCEDURE — 99024 POSTOP FOLLOW-UP VISIT: CPT | Mod: S$GLB,,, | Performed by: OPHTHALMOLOGY

## 2024-11-14 PROCEDURE — 1159F MED LIST DOCD IN RCRD: CPT | Mod: CPTII,S$GLB,, | Performed by: OPHTHALMOLOGY

## 2024-11-14 PROCEDURE — 1126F AMNT PAIN NOTED NONE PRSNT: CPT | Mod: CPTII,S$GLB,, | Performed by: OPHTHALMOLOGY

## 2024-11-14 RX ORDER — PREDNISOLONE ACETATE 10 MG/ML
SUSPENSION/ DROPS OPHTHALMIC
Qty: 10 ML | Refills: 0 | Status: SHIPPED | OUTPATIENT
Start: 2024-11-14

## 2024-11-15 ENCOUNTER — PATIENT MESSAGE (OUTPATIENT)
Dept: PHARMACY | Facility: CLINIC | Age: 79
End: 2024-11-15
Payer: MEDICARE

## 2024-11-15 ENCOUNTER — TELEPHONE (OUTPATIENT)
Dept: PHARMACY | Facility: CLINIC | Age: 79
End: 2024-11-15
Payer: MEDICARE

## 2024-11-15 NOTE — TELEPHONE ENCOUNTER
A [employer] ([employer]) Renewal Patient Assistance Application for Kaylee BAILEY Romero  - MRN [id number]  was faxed to your office @ [phone]. Please have ANDREW Fox  review the application to ensure the prescription is correct. If correct, sign and fax the application back to the Pharmacy Patient Assistance Team @[phone]. Do not fax to the Manufacture Program    Please do not write any corrections on this application.  If changes are needed, let me know ASAP and the corrected application will be re-faxed to your office for Provider signature.      Kaylee Esparza

## 2024-11-19 ENCOUNTER — TELEPHONE (OUTPATIENT)
Dept: OPHTHALMOLOGY | Facility: CLINIC | Age: 79
End: 2024-11-19
Payer: MEDICARE

## 2024-11-19 NOTE — TELEPHONE ENCOUNTER
----- Message from Maxwell sent at 11/19/2024  3:29 PM CST -----  Regarding: Consult/Advisory  Contact: [phone]  Consult/Advisory     Name Of Caller: Kaylee Romero        Contact Preference:  [phone]     Nature of call: Pt has started her three drops a day as of her last visit and has been seeing clearly. Today her vision is now blurry today and she is wondering is she should go back to four drops a day.

## 2024-11-19 NOTE — TELEPHONE ENCOUNTER
Pt states that her vision is blurry and thought she shoul;d go back up to using prednisolone drops 4 times a day. Pt advised to continue using it 3 times aday and follow the steroid taper as instructed. Pt denies and pain cory there symptoms besides blurry vision. Pt to keep appt on 12/5/24

## 2024-11-20 ENCOUNTER — OCHSNER VIRTUAL EMERGENCY DEPARTMENT (OUTPATIENT)
Facility: CLINIC | Age: 79
End: 2024-11-20
Payer: MEDICARE

## 2024-11-20 ENCOUNTER — NURSE TRIAGE (OUTPATIENT)
Dept: ADMINISTRATIVE | Facility: CLINIC | Age: 79
End: 2024-11-20
Payer: MEDICARE

## 2024-11-20 ENCOUNTER — HOSPITAL ENCOUNTER (EMERGENCY)
Facility: HOSPITAL | Age: 79
Discharge: HOME | End: 2024-11-21
Attending: EMERGENCY MEDICINE
Payer: MEDICARE

## 2024-11-20 DIAGNOSIS — R10.9 FLANK PAIN: Primary | ICD-10-CM

## 2024-11-20 DIAGNOSIS — N39.0 URINARY TRACT INFECTION WITHOUT HEMATURIA, SITE UNSPECIFIED: ICD-10-CM

## 2024-11-20 LAB
ALBUMIN SERPL BCP-MCNC: 3.6 G/DL (ref 3.5–5.2)
ALP SERPL-CCNC: 84 U/L (ref 40–150)
ALT SERPL W/O P-5'-P-CCNC: 16 U/L (ref 10–44)
ANION GAP SERPL CALC-SCNC: 11 MMOL/L (ref 8–16)
AST SERPL-CCNC: 25 U/L (ref 10–40)
BACTERIA #/AREA URNS AUTO: ABNORMAL /HPF
BASOPHILS # BLD AUTO: 0.04 K/UL (ref 0–0.2)
BASOPHILS NFR BLD: 0.6 % (ref 0–1.9)
BILIRUB SERPL-MCNC: 0.3 MG/DL (ref 0.1–1)
BILIRUB UR QL STRIP: NEGATIVE
BUN SERPL-MCNC: 21 MG/DL (ref 8–23)
BUN SERPL-MCNC: 28 MG/DL (ref 6–30)
CALCIUM SERPL-MCNC: 9.7 MG/DL (ref 8.7–10.5)
CHLORIDE SERPL-SCNC: 104 MMOL/L (ref 95–110)
CHLORIDE SERPL-SCNC: 104 MMOL/L (ref 95–110)
CLARITY UR REFRACT.AUTO: CLEAR
CO2 SERPL-SCNC: 24 MMOL/L (ref 23–29)
COLOR UR AUTO: YELLOW
CREAT SERPL-MCNC: 1.3 MG/DL (ref 0.5–1.4)
CREAT SERPL-MCNC: 1.3 MG/DL (ref 0.5–1.4)
DIFFERENTIAL METHOD BLD: ABNORMAL
EOSINOPHIL # BLD AUTO: 0.3 K/UL (ref 0–0.5)
EOSINOPHIL NFR BLD: 3.8 % (ref 0–8)
ERYTHROCYTE [DISTWIDTH] IN BLOOD BY AUTOMATED COUNT: 16 % (ref 11.5–14.5)
EST. GFR  (NO RACE VARIABLE): 42.4 ML/MIN/1.73 M^2
GLUCOSE SERPL-MCNC: 115 MG/DL (ref 70–110)
GLUCOSE SERPL-MCNC: 118 MG/DL (ref 70–110)
GLUCOSE UR QL STRIP: NEGATIVE
HCT VFR BLD AUTO: 33.6 % (ref 37–48.5)
HCT VFR BLD CALC: 35 %PCV (ref 36–54)
HCV AB SERPL QL IA: NORMAL
HGB BLD-MCNC: 11.1 G/DL (ref 12–16)
HGB UR QL STRIP: NEGATIVE
HIV 1+2 AB+HIV1 P24 AG SERPL QL IA: NORMAL
IMM GRANULOCYTES # BLD AUTO: 0.03 K/UL (ref 0–0.04)
IMM GRANULOCYTES NFR BLD AUTO: 0.4 % (ref 0–0.5)
KETONES UR QL STRIP: NEGATIVE
LEUKOCYTE ESTERASE UR QL STRIP: ABNORMAL
LIPASE SERPL-CCNC: 59 U/L (ref 4–60)
LYMPHOCYTES # BLD AUTO: 1.3 K/UL (ref 1–4.8)
LYMPHOCYTES NFR BLD: 18.7 % (ref 18–48)
MCH RBC QN AUTO: 28.2 PG (ref 27–31)
MCHC RBC AUTO-ENTMCNC: 33 G/DL (ref 32–36)
MCV RBC AUTO: 85 FL (ref 82–98)
MICROSCOPIC COMMENT: ABNORMAL
MONOCYTES # BLD AUTO: 0.5 K/UL (ref 0.3–1)
MONOCYTES NFR BLD: 7.4 % (ref 4–15)
NEUTROPHILS # BLD AUTO: 4.7 K/UL (ref 1.8–7.7)
NEUTROPHILS NFR BLD: 69.1 % (ref 38–73)
NITRITE UR QL STRIP: NEGATIVE
NRBC BLD-RTO: 0 /100 WBC
PH UR STRIP: 6 [PH] (ref 5–8)
PLATELET # BLD AUTO: 194 K/UL (ref 150–450)
PMV BLD AUTO: 10.3 FL (ref 9.2–12.9)
POC IONIZED CALCIUM: 1.14 MMOL/L (ref 1.06–1.42)
POC TCO2 (MEASURED): 27 MMOL/L (ref 23–29)
POTASSIUM BLD-SCNC: 3.9 MMOL/L (ref 3.5–5.1)
POTASSIUM SERPL-SCNC: 4 MMOL/L (ref 3.5–5.1)
PROT SERPL-MCNC: 8.7 G/DL (ref 6–8.4)
PROT UR QL STRIP: NEGATIVE
RBC # BLD AUTO: 3.94 M/UL (ref 4–5.4)
RBC #/AREA URNS AUTO: 2 /HPF (ref 0–4)
SAMPLE: ABNORMAL
SODIUM BLD-SCNC: 141 MMOL/L (ref 136–145)
SODIUM SERPL-SCNC: 139 MMOL/L (ref 136–145)
SP GR UR STRIP: 1.01 (ref 1–1.03)
SQUAMOUS #/AREA URNS AUTO: 0 /HPF
URN SPEC COLLECT METH UR: ABNORMAL
WBC # BLD AUTO: 6.78 K/UL (ref 3.9–12.7)
WBC #/AREA URNS AUTO: 39 /HPF (ref 0–5)

## 2024-11-20 PROCEDURE — 87088 URINE BACTERIA CULTURE: CPT | Performed by: EMERGENCY MEDICINE

## 2024-11-20 PROCEDURE — 87186 SC STD MICRODIL/AGAR DIL: CPT | Performed by: EMERGENCY MEDICINE

## 2024-11-20 PROCEDURE — 99285 EMERGENCY DEPT VISIT HI MDM: CPT | Mod: 25

## 2024-11-20 PROCEDURE — 85025 COMPLETE CBC W/AUTO DIFF WBC: CPT | Performed by: EMERGENCY MEDICINE

## 2024-11-20 PROCEDURE — 86803 HEPATITIS C AB TEST: CPT | Performed by: PHYSICIAN ASSISTANT

## 2024-11-20 PROCEDURE — 87389 HIV-1 AG W/HIV-1&-2 AB AG IA: CPT | Performed by: PHYSICIAN ASSISTANT

## 2024-11-20 PROCEDURE — 87086 URINE CULTURE/COLONY COUNT: CPT | Performed by: EMERGENCY MEDICINE

## 2024-11-20 PROCEDURE — 81001 URINALYSIS AUTO W/SCOPE: CPT | Performed by: EMERGENCY MEDICINE

## 2024-11-20 PROCEDURE — 83690 ASSAY OF LIPASE: CPT | Performed by: EMERGENCY MEDICINE

## 2024-11-20 PROCEDURE — 80053 COMPREHEN METABOLIC PANEL: CPT | Performed by: EMERGENCY MEDICINE

## 2024-11-20 PROCEDURE — 80047 BASIC METABLC PNL IONIZED CA: CPT

## 2024-11-20 NOTE — TELEPHONE ENCOUNTER
"Kaylee c/o right rib cage pain that progressed to left rib cage and is now in bilateral rib cage. Pain increases with movement. Symptom began 3 days ago. Denies recent injury/fall. Denies cough. Advised pt per triage protocol to go to nearest ED/UC now (or PCP triage). Advised pt per Dr. Jeff Sotelo, OCP, pt with cardiac hx, recommends pt go to nearby UC now for EKG & CXR to start with. UC can escalate to ER if needed. Kaylee v/u.     Reason for Disposition   Taking a deep breath makes pain worse    Additional Information   Negative: SEVERE difficulty breathing (e.g., struggling for each breath, speaks in single words)   Negative: Difficult to awaken or acting confused (e.g., disoriented, slurred speech)   Negative: Shock suspected (e.g., cold/pale/clammy skin, too weak to stand, low BP, rapid pulse)   Negative: Passed out (e.g., fainted, lost consciousness, blacked out and was not responding)   Negative: [1] Chest pain lasts > 5 minutes AND [2] age > 44   Negative: [1] Chest pain lasts > 5 minutes AND [2] age > 30 AND [3] one or more cardiac risk factors (e.g., diabetes, high blood pressure, high cholesterol, obesity with BMI 30 or higher, smoker, or strong family history of heart disease)   Negative: [1] Chest pain lasts > 5 minutes AND [2] history of heart disease (i.e., angina, heart attack, heart failure, bypass surgery, takes nitroglycerin)   Negative: [1] Chest pain lasts > 5 minutes AND [2] described as crushing, pressure-like, or heavy   Negative: Heart beating < 50 beats per minute OR > 140 beats per minute   Negative: Visible sweat on face or sweat dripping down face   Negative: Sounds like a life-threatening emergency to the triager   Negative: Followed a chest injury   Negative: SEVERE chest pain   Negative: [1] Chest pain (or "angina") comes and goes AND [2] is happening more often (increasing in frequency) or getting worse (increasing in severity)  (Exception: Chest pains that last only a few " "seconds.)   Negative: Pain also in shoulder(s) or arm(s) or jaw  (Exception: Pain is clearly made worse by movement.)   Negative: Difficulty breathing   Negative: Feeling weak or lightheaded (e.g., woozy, feeling like they might faint)   Negative: Coughing up blood   Negative: Cocaine use within last 3 days   Negative: Major surgery in past month   Negative: Hip or leg fracture (broken bone) in past month (or had cast on leg or ankle in past month)   Negative: Illness requiring prolonged bedrest in past month (e.g., immobilization, long hospital stay)   Negative: Long-distance travel in past month (e.g., car, bus, train, plane; with trip lasting 6 or more hours)   Negative: History of prior "blood clot" in leg or lungs (i.e., deep vein thrombosis, pulmonary embolism)   Negative: History of inherited increased risk of blood clots (e.g., Factor 5 Leiden, Anti-thrombin 3, Protein C or Protein S deficiency, Prothrombin mutation)   Negative: Cancer treatment in past six months (or has cancer now)   Negative: [1] Chest pain lasts > 5 minutes AND [2] occurred in past 3 days (72 hours) (Exception: Feels exactly the same as previously diagnosed heartburn and has accompanying sour taste in mouth.)    Protocols used: Chest Pain-A-AH    "

## 2024-11-20 NOTE — TELEPHONE ENCOUNTER
Pt calling back as call was dropped. Previous triager notified of pt call back.     Reason for Disposition   [1] Follow-up call to recent contact AND [2] information only call, no triage required    Protocols used: Information Only Call - No Triage-A-

## 2024-11-21 VITALS
HEIGHT: 62 IN | WEIGHT: 238 LBS | BODY MASS INDEX: 43.79 KG/M2 | TEMPERATURE: 98 F | DIASTOLIC BLOOD PRESSURE: 76 MMHG | HEART RATE: 89 BPM | SYSTOLIC BLOOD PRESSURE: 159 MMHG | OXYGEN SATURATION: 100 % | RESPIRATION RATE: 18 BRPM

## 2024-11-21 PROCEDURE — 25000003 PHARM REV CODE 250: Performed by: EMERGENCY MEDICINE

## 2024-11-21 RX ORDER — CIPROFLOXACIN 500 MG/1
500 TABLET ORAL 2 TIMES DAILY
Qty: 14 TABLET | Refills: 0 | Status: SHIPPED | OUTPATIENT
Start: 2024-11-21 | End: 2024-11-28

## 2024-11-21 RX ORDER — CYCLOBENZAPRINE HCL 5 MG
5-10 TABLET ORAL 3 TIMES DAILY PRN
Qty: 20 TABLET | Refills: 0 | Status: SHIPPED | OUTPATIENT
Start: 2024-11-21 | End: 2024-12-01

## 2024-11-21 RX ORDER — LIDOCAINE 50 MG/G
1 PATCH TOPICAL DAILY
Qty: 14 PATCH | Refills: 0 | Status: SHIPPED | OUTPATIENT
Start: 2024-11-21

## 2024-11-21 RX ORDER — CIPROFLOXACIN 500 MG/1
500 TABLET ORAL 2 TIMES DAILY
Qty: 14 TABLET | Refills: 0 | Status: SHIPPED | OUTPATIENT
Start: 2024-11-21 | End: 2024-11-21

## 2024-11-21 RX ORDER — CIPROFLOXACIN 500 MG/1
500 TABLET ORAL
Status: COMPLETED | OUTPATIENT
Start: 2024-11-21 | End: 2024-11-21

## 2024-11-21 RX ADMIN — CIPROFLOXACIN 500 MG: 500 TABLET ORAL at 01:11

## 2024-11-21 NOTE — ED PROVIDER NOTES
Encounter Date: 2024       History     Chief Complaint   Patient presents with    Flank Pain     Pt complaining of right flank pain that started several days ago and states the pain has now migrated to her left side as well. Pt denies any dysuria, nausea or vomiting     79-year-old female presents with achy pain to her back.  It started in the right flank and in his moved to the left flank.  It has been there for 3 days.  Seems to be worse with certain movements or change in position.  No abdominal pain fever or vomiting.  No dysuria or hematuria.  Denies any chest pain or shortness breath.  It is not pleuritic.  No weakness to her arms or legs.  No change in bowel or bladder.  No fevers.      Review of patient's allergies indicates:   Allergen Reactions    Bactrim [sulfamethoxazole-trimethoprim]      Generic version  Sulfa makes her sick    Keflex [cephalexin]      Turns orange     Past Medical History:   Diagnosis Date    Age-related osteoporosis without current pathological fracture 2019    Anemia     Cataract     Chronic kidney disease, stage III (moderate) 2017    CKD (chronic kidney disease) stage 3, GFR 30-59 ml/min     DDD (degenerative disc disease), cervical 2024    Dry mouth     History of TIA (transient ischemic attack) 2018    Hyperlipidemia 2014    Hypertension     Morbid obesity with body mass index (BMI) of 40.0 or higher 2016    KAMRAN (obstructive sleep apnea)     KAMRAN on CPAP 2016    Osteoporosis without current pathological fracture 2018    Physical deconditioning 2018    Status post total left knee replacement 2017    Type 2 diabetes mellitus with stage 3 chronic kidney disease, without long-term current use of insulin 2019     Past Surgical History:   Procedure Laterality Date    ankle surgery (l) Left     APPENDECTOMY      CATARACT EXTRACTION W/  INTRAOCULAR LENS IMPLANT Right 2024          SECTION      CORONARY ARTERY BYPASS GRAFT  09/2019    x 2    CORONARY ARTERY BYPASS GRAFT (CABG) N/A 08/12/2019    Procedure: CORONARY ARTERY BYPASS GRAFT (CABG)X3;  Surgeon: Peterson Ventura MD;  Location: [address];  Service: Cardiovascular;  Laterality: N/A;    CORONARY BYPASS GRAFT ANGIOGRAPHY  10/18/2021    Procedure: Bypass graft study;  Surgeon: Jamar Haddad MD;  Location: [facility] CATH LAB;  Service: Cardiology;;    DILATION AND CURETTAGE OF UTERUS      ENDOSCOPIC HARVEST OF VEIN Left 08/12/2019    Procedure: SURGICAL PROCUREMENT, VEIN, ENDOSCOPIC;  Surgeon: Peterson Ventura MD;  [facility];  Service: Cardiovascular;  Laterality: Left;  Vein [place] Start: 0843; End: 1054   Vein Prep Start: 1055; End: 1145    INTRAOCULAR PROSTHESES INSERTION Right 11/06/2024    Procedure: INSERTION, IOL PROSTHESIS;  Surgeon: Sylvia Dela Cruz MD;  Location: [address];  Service: Ophthalmology;  Laterality: Right;    JOINT REPLACEMENT Left     knee replacement    KNEE ARTHROSCOPY W/ DEBRIDEMENT      KNEE SURGERY Left 05/01/2017    TKR    LEFT HEART CATHETERIZATION Left 07/09/2019    Procedure: Left heart cath;  Surgeon: Ronak Gibbons MD;  Location: [facility] CATH LAB;  Service: Cardiology;  Laterality: Left;    LEFT HEART CATHETERIZATION Left 10/18/2021    Procedure: Left heart cath;  Surgeon: Jamar Haddad MD;  Location: [facility] CATH LAB;  Service: Cardiology;  Laterality: Left;    PHACOEMULSIFICATION OF CATARACT Right 11/06/2024    Procedure: PHACOEMULSIFICATION, CATARACT;  Surgeon: Sylvia Dela Cruz MD;  Location: [address];  Service: Ophthalmology;  Laterality: Right;    TOTAL KNEE ARTHROPLASTY Right 03/09/2023    Procedure: ARTHROPLASTY, KNEE, TOTAL:RIGHT;  Surgeon: Peterson Sharma MD;  [facility]: [facility] OR [facility];  Service: Orthopedics;  Laterality: Right;    TRANSFORAMINAL EPIDURAL INJECTION OF STEROID Left 11/15/2023    Procedure: LUMBAR TRANSFORAMINAL LEFT L2/3 AND  L3/4 DIRECT REFERRAL *PLAVIX CLEARANCE IN CHART*;  Surgeon: Frank Ken MD;  [facility];  Service: Pain Management;  Laterality: Left;     Family History   Problem Relation Name Age of Onset    Heart attack Mother      Heart disease Father      Stroke Father      Heart failure Father      Diabetes Father      Arrhythmia Father      No Known Problems Brother      Stroke Paternal Grandfather      No Known Problems Son      Breast cancer Neg Hx      Colon cancer Neg Hx      Ovarian cancer Neg Hx      Amblyopia Neg Hx      Blindness Neg Hx      Cancer Neg Hx      Cataracts Neg Hx      Glaucoma Neg Hx      Hypertension Neg Hx      Macular degeneration Neg Hx      Retinal detachment Neg Hx      Strabismus Neg Hx      Thyroid disease Neg Hx      Esophageal cancer Neg Hx       Social History     Tobacco Use    Smoking status: Never     Passive exposure: Never    Smokeless tobacco: Never   Substance Use Topics    Alcohol use: Yes     Alcohol/week: 1.0 standard drink of alcohol     Types: 1 Shots of liquor per week     Comment: rare    Drug use: No     Review of Systems    Physical Exam     Initial Vitals [11/20/24 2016]   BP Pulse Resp Temp SpO2   (!) 168/80 97 18 98.1 °F (36.7 °C) 100 %      MAP       --         Physical Exam    Constitutional: She appears well-developed and well-nourished. She is not diaphoretic. No distress.   HENT:   Head: Normocephalic and atraumatic.   Eyes: Conjunctivae are normal.   Neck: Neck supple. No tracheal deviation present. No JVD present.   Normal range of motion.  Cardiovascular:  Normal rate, regular rhythm, normal heart sounds and intact distal pulses.           Pulmonary/Chest: Breath sounds normal. No respiratory distress. She has no wheezes. She has no rhonchi. She has no rales.   Abdominal: Abdomen is soft. Bowel sounds are normal. She exhibits no distension. There is no abdominal tenderness. There is no rebound.   Musculoskeletal:         General: No edema.       Cervical back: Normal range of motion and neck supple.     Neurological: She is alert. She has normal strength. No cranial nerve deficit or sensory deficit. GCS score is 15. GCS eye subscore is 4. GCS verbal subscore is 5. GCS motor subscore is 6.   Skin: Skin is warm. No rash noted.   Psychiatric: She has a normal mood and affect.         ED Course   Procedures  Labs Reviewed   CULTURE, URINE - Abnormal       Result Value    Urine Culture, Routine   (*)     Value: GRAM NEGATIVE KANDY  50,000 - 99,999 cfu/ml  Identification and susceptibility pending      Narrative:     Specimen Source->Urine   CBC W/ AUTO DIFFERENTIAL - Abnormal    WBC 6.78      RBC 3.94 (*)     Hemoglobin 11.1 (*)     Hematocrit 33.6 (*)     MCV 85      MCH 28.2      MCHC 33.0      RDW 16.0 (*)     Platelets 194      MPV 10.3      Immature Granulocytes 0.4      Gran # (ANC) 4.7      Immature Grans (Abs) 0.03      Lymph # 1.3      Mono # 0.5      Eos # 0.3      Baso # 0.04      nRBC 0      Gran % 69.1      Lymph % 18.7      Mono % 7.4      Eosinophil % 3.8      Basophil % 0.6      Differential Method Automated     COMPREHENSIVE METABOLIC PANEL - Abnormal    Sodium 139      Potassium 4.0      Chloride 104      CO2 24      Glucose 115 (*)     BUN 21      Creatinine 1.3      Calcium 9.7      Total Protein 8.7 (*)     Albumin 3.6      Total Bilirubin 0.3      Alkaline Phosphatase 84      AST 25      ALT 16      eGFR 42.4 (*)     Anion Gap 11     URINALYSIS, REFLEX TO URINE CULTURE - Abnormal    Specimen UA Urine, Clean Catch      Color, UA Yellow      Appearance, UA Clear      pH, UA 6.0      Specific Gravity, UA 1.015      Protein, UA Negative      Glucose, UA Negative      Ketones, UA Negative      Bilirubin (UA) Negative      Occult Blood UA Negative      Nitrite, UA Negative      Leukocytes, UA 3+ (*)     Narrative:     Specimen Source->Urine   URINALYSIS MICROSCOPIC - Abnormal    RBC, UA 2      WBC, UA 39 (*)     Bacteria Many (*)     Squam Epithel,  UA 0      Microscopic Comment SEE COMMENT      Narrative:     Specimen Source->Urine   ISTAT PROCEDURE - Abnormal    POC Glucose 118 (*)     POC BUN 28      POC Creatinine 1.3      POC Sodium 141      POC Potassium 3.9      POC Chloride 104      POC TCO2 (MEASURED) 27      POC Ionized Calcium 1.14      POC Hematocrit 35 (*)     Sample FAUSTO     HEPATITIS C ANTIBODY    Hepatitis C Ab Non-reactive      Narrative:     Release to patient->Immediate   HIV 1 / 2 ANTIBODY    HIV 1/2 Ag/Ab Non-reactive      Narrative:     Release to patient->Immediate   LIPASE    Lipase 59            Imaging Results              CT Abdomen Pelvis  Without Contrast (Final result)  Result time 11/21/24 03:25:15      Final result by Maximus Guzman MD (11/21/24 03:25:15)                   Impression:      1 cm left-sided nonobstructive kidney stone.    Diverticulosis without evidence of diverticulitis.    Electronically signed by resident: Mark Anaya  Date:    11/21/2024  Time:    03:04    Electronically signed by: Maximus Guzman MD  Date:    11/21/2024  Time:    03:25               Narrative:    EXAMINATION:  CT ABDOMEN PELVIS WITHOUT CONTRAST    CLINICAL HISTORY:  Flank pain, kidney stone suspected;    TECHNIQUE:  Low dose axial images, sagittal and coronal reformations were obtained from the lung bases to the pubic symphysis.  Contrast was not administered.    COMPARISON:  Retroperitoneal ultrasound 04/01/2024    FINDINGS:  Evaluation of solid organs is limited due to the absence of intravenous contrast.    Lungs: Lung bases are clear.  No consolidation or pleural effusion in the visualized lung bases.    Heart: Normal size. No pericardial effusion.  Multi-vessel calcific atherosclerosis.    Liver: Normal size. No focal abnormality.    Gallbladder: No stones.  No pericholecystic inflammatory changes.    Bile ducts: No intrahepatic or extrahepatic biliary ductal dilatation.    Spleen: Normal size. No focal  abnormality.    Pancreas: No mass. No peripancreatic fat stranding.    Adrenals: No significant abnormality    Renal/Ureters: The kidneys are normal in size. 1 cm nonobstructive left-sided stone.  No hydroureteronephrosis. The urinary bladder is unremarkable.    Reproductive: Calcified uterine fibroids.    Stomach/Bowel: Stomach is unremarkable.  Small bowel is normal caliber without obstruction or inflammation. Appendix is not visualized.  Large bowel is normal caliber without obstruction or inflammation. Diverticulosis coli.    Peritoneum: No free fluid. No intraperitoneal free air.    Lymph Nodes: No pathologic hamida enlargement in the abdomen or pelvis.    Vasculature: Abdominal aorta tapers normally.  Mild calcific atherosclerosis of the abdominal aorta and its branches.    Bones: Degenerative changes of the spine.  No acute fractures or osseous destructive lesions.    Soft Tissues: No significant abnormality.                                       Medications   ciprofloxacin HCl tablet 500 mg (500 mg Oral Given 11/21/24 0139)     Medical Decision Making                                    Clinical Impression:  Final diagnoses:  [R10.9] Flank pain (Primary)  [N39.0] Urinary tract infection without hematuria, site unspecified          ED Disposition Condition    Discharge Stable          ED Prescriptions       Medication Sig Dispense Start Date End Date Auth. Provider    ciprofloxacin HCl (CIPRO) 500 MG tablet  (Status: Discontinued) Take 1 tablet (500 mg total) by mouth 2 (two) times daily. for 7 days 14 tablet 11/21/2024 11/21/2024 Obi Grant MD    cyclobenzaprine (FLEXERIL) 5 MG tablet Take 1-2 tablets (5-10 mg total) by mouth 3 (three) times daily as needed for Muscle spasms. 20 tablet 11/21/2024 12/1/2024 Miroslava Weems MD    ciprofloxacin HCl (CIPRO) 500 MG tablet Take 1 tablet (500 mg total) by mouth 2 (two) times daily. for 7 days 14 tablet 11/21/2024 11/28/2024 Miroslava Weems MD     LIDOcaine (LIDODERM) 5 % Place 1 patch onto the skin once daily. Remove & Discard patch within 12 hours or as directed by MD 14 patch 11/21/2024 -- Miroslava Weems MD          Follow-up Information       Follow up With Specialties Details Why Contact Info    Liz Roberts MD Internal Medicine   [address]  [place] [id number] [phone]      [facility] - Emergency Dept Emergency Medicine  If symptoms worsen [address] [facility] [address] [phone]

## 2024-11-21 NOTE — PLAN OF CARE-OVED
[facility] Emergency Department Plan of Care Note    Referral source: Nurse On-Call      Reason for consult:Rib pain      Disposition recommended: Urgent Care      Additional Recommendations:     79-year-old female with history of CHF, CAD, HTN, DM called nurse triage for recommendations about bilateral rib pain.  Patient stated that she initially had pain to the right side of her ribcage 3 days ago that now spread to the left side, worse with movement.  She denies any chest pain similar to previous CAD episodes, or SOB/leg swelling suggestive of CHF.  No URI symptoms or cough/fever.  No known trauma or recent falls.  Patient advised to go to urgent care for EKG/chest x-ray, and they can escalate to ED if needed for further cardiac workup

## 2024-11-21 NOTE — ED NOTES
LOC/ APPEARANCE: The patient is AAOx4. Pt is speaking appropriately, no slurred speech. Pt changed into hospital gown. Continuous cardiac monitor, cont pulse ox, and auto BP cuff applied to patient. Pt is clean and well groomed. No JVD visible. Pt reports pain level of 3/10.   SKIN: Skin is warm dry and intact, and color is consistent with ethnicity. Capillary refill <3 seconds. No breakdown or brusing visible. Mucus membranes moist, acyanotic.  RESPIRATORY: Airway is open and patent. Respirations-spontaneous, unlabored, regular rate, equal bilaterally on inspiration and expiration. No accessory muscle use noted.   CARDIAC: Patient has regular heart rate.  No peripheral edema noted, and patient has no c/o chest pain. Peripheral pulses present equal and strong throughout.  ABDOMEN: Flank pain on both sides 3/10  NEUROLOGIC: Eyes open spontaneously and facial expression symmetrical. Pt behavior appropriate to situation, and pt follows commands. Pt reports sensation present in all extremities when touched with a finger, denies any numbness or tingling. PERRLA  MUSCULOSKELETAL: Spontaneous movement noted to all extremities. Hand  equal and leg strength strong +5 bilaterally.   : No complaints of frequency, burning, urgency or blood in the urine. No complaints of incontinence.

## 2024-11-21 NOTE — ED TRIAGE NOTES
Kaylee Romero, a 79 y.o. female presents to the ED w/ complaint of pain in right flank started two days ago. First on the right side and now it migrated to left side.  Pain 3/10.     Triage note:  Chief Complaint   Patient presents with    Flank Pain     Pt complaining of right flank pain that started several days ago and states the pain has now migrated to her left side as well. Pt denies any dysuria, nausea or vomiting     Review of patient's allergies indicates:   Allergen Reactions    Bactrim [sulfamethoxazole-trimethoprim]      Generic version  Sulfa makes her sick    Keflex [cephalexin]      Turns orange     Past Medical History:   Diagnosis Date    Age-related osteoporosis without current pathological fracture 01/11/2019    Anemia     Cataract     Chronic kidney disease, stage III (moderate) 04/20/2017    CKD (chronic kidney disease) stage 3, GFR 30-59 ml/min     DDD (degenerative disc disease), cervical 03/27/2024    Dry mouth     History of TIA (transient ischemic attack) 12/11/2018    Hyperlipidemia 12/02/2014    Hypertension     Morbid obesity with body mass index (BMI) of 40.0 or higher 05/09/2016    KAMRAN (obstructive sleep apnea)     KAMRAN on CPAP 06/28/2016    Osteoporosis without current pathological fracture 11/11/2018    Physical deconditioning 11/05/2018    Status post total left knee replacement 5/1/2017 05/16/2017    Type 2 diabetes mellitus with stage 3 chronic kidney disease, without long-term current use of insulin 05/16/2019

## 2024-11-21 NOTE — FIRST PROVIDER EVALUATION
"Medical screening examination initiated.  I have conducted a focused provider triage encounter, findings are as follows:    Brief history of present illness:  80 yo pt complaining of right flank pain that started several days ago and states the pain has now migrated to her left side as well. Pt denies any dysuria, nausea or vomiting.     Vitals:    11/20/24 2016   BP: (!) 168/80   Pulse: 97   Resp: 18   Temp: 98.1 °F (36.7 °C)   SpO2: 100%   Weight: 108 kg (238 lb)   Height: 5' 2" (1.575 m)       Pertinent physical exam:  Well appearing and in no acute distress.    Brief workup plan:  Labs, analgesia    Preliminary workup initiated; this workup will be continued and followed by the physician or advanced practice provider that is assigned to the patient when roomed.  "

## 2024-11-21 NOTE — PROVIDER PROGRESS NOTES - EMERGENCY DEPT.
Imaging Results              CT Abdomen Pelvis  Without Contrast (Final result)  Result time 11/21/24 03:25:15      Final result by Maximus Guzman MD (11/21/24 03:25:15)                   Impression:      1 cm left-sided nonobstructive kidney stone.    Diverticulosis without evidence of diverticulitis.    Electronically signed by resident: Mark Anaya  Date:    11/21/2024  Time:    03:04    Electronically signed by: Maximus Guzman MD  Date:    11/21/2024  Time:    03:25               Narrative:    EXAMINATION:  CT ABDOMEN PELVIS WITHOUT CONTRAST    CLINICAL HISTORY:  Flank pain, kidney stone suspected;    TECHNIQUE:  Low dose axial images, sagittal and coronal reformations were obtained from the lung bases to the pubic symphysis.  Contrast was not administered.    COMPARISON:  Retroperitoneal ultrasound 04/01/2024    FINDINGS:  Evaluation of solid organs is limited due to the absence of intravenous contrast.    Lungs: Lung bases are clear.  No consolidation or pleural effusion in the visualized lung bases.    Heart: Normal size. No pericardial effusion.  Multi-vessel calcific atherosclerosis.    Liver: Normal size. No focal abnormality.    Gallbladder: No stones.  No pericholecystic inflammatory changes.    Bile ducts: No intrahepatic or extrahepatic biliary ductal dilatation.    Spleen: Normal size. No focal abnormality.    Pancreas: No mass. No peripancreatic fat stranding.    Adrenals: No significant abnormality    Renal/Ureters: The kidneys are normal in size. 1 cm nonobstructive left-sided stone.  No hydroureteronephrosis. The urinary bladder is unremarkable.    Reproductive: Calcified uterine fibroids.    Stomach/Bowel: Stomach is unremarkable.  Small bowel is normal caliber without obstruction or inflammation. Appendix is not visualized.  Large bowel is normal caliber without obstruction or inflammation. Diverticulosis coli.    Peritoneum: No free fluid. No intraperitoneal free air.    Lymph  Nodes: No pathologic hamida enlargement in the abdomen or pelvis.    Vasculature: Abdominal aorta tapers normally.  Mild calcific atherosclerosis of the abdominal aorta and its branches.    Bones: Degenerative changes of the spine.  No acute fractures or osseous destructive lesions.    Soft Tissues: No significant abnormality.                                     Patient was signed out pending CT abdomen pelvis.  CT unremarkable incidentally showing a 1 cm nonobstructing stone in her left kidney.  Suspect pain is secondary to a urinary tract infection, possible pyelo versus Msk pain.  Ciprofloxacin prescribed by Dr. Grant.  Patient was stable for discharge home with outpatient follow up.

## 2024-11-21 NOTE — ED NOTES
I-STAT Chem-8+ Results:   Value Reference Range   Sodium 141 136-145 mmol/L   Potassium  3.9 3.5-5.1 mmol/L   Chloride 104  mmol/L   Ionized Calcium 1.14 1.06-1.42 mmol/L   CO2 (measured) 27 23-29 mmol/L   Glucose 118  mg/dL   BUN 28 6-30 mg/dL   Creatinine 1.3 0.5-1.4 mg/dL   Hematocrit 35 36-54%

## 2024-11-22 ENCOUNTER — PATIENT OUTREACH (OUTPATIENT)
Dept: EMERGENCY MEDICINE | Facility: HOSPITAL | Age: 79
End: 2024-11-22
Payer: MEDICARE

## 2024-11-22 ENCOUNTER — PATIENT MESSAGE (OUTPATIENT)
Dept: PHARMACY | Facility: CLINIC | Age: 79
End: 2024-11-22
Payer: MEDICARE

## 2024-11-22 NOTE — ASSESSMENT & PLAN NOTE
Called life insurance and left  for call back. I called pt left  to contact [facility] to send new form because pt does not have diabetes    NSTEMI pathway initiated with DAPT, low-intensity heparin infusion, Interventional cardiology consult.  Will recheck troponin to make sure not continuing upward trend.

## 2024-11-22 NOTE — PROGRESS NOTES
Call placed per ED Navigator to f/u from her recent ED visit. Pt states that she had a good visit and praises the Dr's for taking great care of her. Pt denies having any current questions or concerns at this time. ED Navigator will continue to follow and assist as needed. 12-11-24 scheduled for a Nephro f/u. Pt encouraged to reach out with any concerns at they arise.

## 2024-11-23 LAB — BACTERIA UR CULT: ABNORMAL

## 2024-11-25 ENCOUNTER — PATIENT MESSAGE (OUTPATIENT)
Dept: PHARMACY | Facility: CLINIC | Age: 79
End: 2024-11-25
Payer: MEDICARE

## 2024-12-03 ENCOUNTER — PATIENT MESSAGE (OUTPATIENT)
Dept: ENDOCRINOLOGY | Facility: CLINIC | Age: 79
End: 2024-12-03
Payer: MEDICARE

## 2024-12-03 NOTE — PROGRESS NOTES
HPI     Post-op Evaluation            Comments: Pt states vision OD is doing okay           Spots and/or Floaters            Comments: Pt c/o floaters OD but denies any flashes          Comments    S/p Phaco IOL OD 11/6/24    1. NS OS  2. PVD OU  3. Retinal Drusen OU  4. Type 2 DM no DR  5. KAMRAN (on CPAP)  6. Essential HTN  7. Presbyopia  8. PCIOL OD    MEDS:  PA BID OD          Last edited by Dayana Delgado MA on 12/5/2024  2:26 PM.            Assessment /Plan     For exam results, see Encounter Report.    Postoperative care for cataract    Nuclear sclerotic cataract of left eye    Cortical senile cataract of left eye    Abnormal red reflex of eye    Type 2 diabetes mellitus without ophthalmic manifestations    PVD (posterior vitreous detachment), both eyes  -     Posterior Segment OCT Retina-Both eyes    Pseudophakia, right eye    KAMRAN on CPAP        This patient is the wife of a long standing glaucoma patient of mine   Want to continue with me for yearly checks       PVD (posterior vitreous detachment), bilateral  Retinal drusen of both eyes  Arcus senilis of both corneas  Type 2 diabetes mellitus without ophthalmic manifestations  Essential hypertension              No retinopathy, monitor yearly   // OD done 11/6/2024             visually significant at this time OD>OS     Consider cat sx OD first     KAMRAN on CPAP     Presbyopia              Rx specs    Pt C/O blurry vision and glare - OD>OS   Has been getting worse over the last year   Visually sign OU -     Discussed options and pt would like to have phaco/IOL od first -   Pt is hyperopic with slight stigmatism ou   Discussed IOL options   Pt declines a mutifocal or toric IOL   Prefers a monofocal IOL - set for distance plano to -0.5     Pt is very anxious and does not want to be aware of anything that is going on. She is requesting general anesthesia if possible. She is very squeamish about her eyes and is afraid she will not be able to be still.     HOLD  TRULICITY FOR > 1 WEEK BEFORE SURGERY - pt is aware that if she does not hold this that the surgery will have to be canceled and re-scheduled - because of a increase in anesthetic risk .     IOL calcs   OD  DIB00 24.0  MTA4U0 20.0    Pt is referring 2 patients to me   Beena Nicole - h/o poor result post CE elsewhere   ? Harriet Moraes ( I use to see this patients mother Ms Eugenia treviño - who has passed)     Phaco / IOL OD Date: 11/6/2024 - general anesthesia -   POW  # 4   - phaco/IOL   Doing well - mild corneal edema   Cont Pred Acetate taper - 1 x day for 2 weeks then stop   OCT - 12/5/2024 - NO CME     F/U 4-6  week, AR/MR ou // BAT OS   - pt may want 2nd eye done in late January after her birthday 1/16/2023 (will need to hold trulicity again )

## 2024-12-05 ENCOUNTER — OFFICE VISIT (OUTPATIENT)
Dept: OPHTHALMOLOGY | Facility: CLINIC | Age: 79
End: 2024-12-05
Payer: MEDICARE

## 2024-12-05 DIAGNOSIS — Z48.810 POSTOPERATIVE CARE FOR CATARACT: Primary | ICD-10-CM

## 2024-12-05 DIAGNOSIS — Z98.49 POSTOPERATIVE CARE FOR CATARACT: Primary | ICD-10-CM

## 2024-12-05 DIAGNOSIS — E11.9 TYPE 2 DIABETES MELLITUS WITHOUT OPHTHALMIC MANIFESTATIONS: ICD-10-CM

## 2024-12-05 DIAGNOSIS — H25.12 NUCLEAR SCLEROTIC CATARACT OF LEFT EYE: ICD-10-CM

## 2024-12-05 DIAGNOSIS — H25.012 CORTICAL SENILE CATARACT OF LEFT EYE: ICD-10-CM

## 2024-12-05 DIAGNOSIS — Z96.1 PSEUDOPHAKIA, RIGHT EYE: ICD-10-CM

## 2024-12-05 DIAGNOSIS — H57.89 ABNORMAL RED REFLEX OF EYE: ICD-10-CM

## 2024-12-05 DIAGNOSIS — H43.813 PVD (POSTERIOR VITREOUS DETACHMENT), BOTH EYES: ICD-10-CM

## 2024-12-05 DIAGNOSIS — G47.33 OSA ON CPAP: ICD-10-CM

## 2024-12-05 PROCEDURE — 92134 CPTRZ OPH DX IMG PST SGM RTA: CPT | Mod: S$GLB,,, | Performed by: OPHTHALMOLOGY

## 2024-12-05 PROCEDURE — 1126F AMNT PAIN NOTED NONE PRSNT: CPT | Mod: CPTII,S$GLB,, | Performed by: OPHTHALMOLOGY

## 2024-12-05 PROCEDURE — 3288F FALL RISK ASSESSMENT DOCD: CPT | Mod: CPTII,S$GLB,, | Performed by: OPHTHALMOLOGY

## 2024-12-05 PROCEDURE — 99024 POSTOP FOLLOW-UP VISIT: CPT | Mod: S$GLB,,, | Performed by: OPHTHALMOLOGY

## 2024-12-05 PROCEDURE — 99999 PR PBB SHADOW E&M-EST. PATIENT-LVL III: CPT | Mod: PBBFAC,,, | Performed by: OPHTHALMOLOGY

## 2024-12-05 PROCEDURE — 1160F RVW MEDS BY RX/DR IN RCRD: CPT | Mod: CPTII,S$GLB,, | Performed by: OPHTHALMOLOGY

## 2024-12-05 PROCEDURE — 1101F PT FALLS ASSESS-DOCD LE1/YR: CPT | Mod: CPTII,S$GLB,, | Performed by: OPHTHALMOLOGY

## 2024-12-05 PROCEDURE — 1159F MED LIST DOCD IN RCRD: CPT | Mod: CPTII,S$GLB,, | Performed by: OPHTHALMOLOGY

## 2024-12-09 ENCOUNTER — PATIENT OUTREACH (OUTPATIENT)
Dept: EMERGENCY MEDICINE | Facility: HOSPITAL | Age: 79
End: 2024-12-09
Payer: MEDICARE

## 2024-12-09 NOTE — PROGRESS NOTES
Pt reminded of appt with, [facility], Nephrology on 12-11-24 at 10am.Pt verbalized understanding.

## 2024-12-09 NOTE — PROGRESS NOTES
Call placed per ED Navigator to f/u from last encounter and remind of appt on 12-11-24. Pt denies having any current questions or concerns at this time. ED Navigator will continue to follow and assist as needed. Pt encouraged to reach out with any concerns at they arise.

## 2024-12-10 NOTE — PROGRESS NOTES
HPI  This 79 y.o. y/o female with PMH of HTN, HLD, type 2 DM, HFpEF, morbid obesity, KAMRAN, CAD s/p CABG in 09/2019, CKD came in for CKD.    Renal history  Creatinine   Date Value Ref Range Status   11/20/2024 1.3 0.5 - 1.4 mg/dL Final   11/11/2024 1.3 0.5 - 1.4 mg/dL Final   10/18/2024 1.5 (H) 0.5 - 1.4 mg/dL Final   10/18/2024 1.5 (H) 0.5 - 1.4 mg/dL Final   08/09/2024 1.2 0.5 - 1.4 mg/dL Final   06/21/2024 1.2 0.5 - 1.4 mg/dL Final   04/09/2024 1.2 0.5 - 1.4 mg/dL Final   03/05/2024 1.4 0.5 - 1.4 mg/dL Final   01/24/2024 1.2 0.5 - 1.4 mg/dL Final   01/24/2024 1.2 0.5 - 1.4 mg/dL Final   Cr 1.5 10/18/2024, now at 1.3; UTI episode s/p treatment in Nov (E coli)  Baseline Cr 1.0-1.4 since 2019  Cr up to 2.4 in 2021, reason unknown, then down to 1.0-1.4  DOREEN Cr up to 2.0 christin CABG in 08/2019  Prot/Creat Ratio, Urine   Date Value Ref Range Status   11/11/2024 Unable to calculate 0.00 - 0.20 Final   06/21/2024 Unable to calculate 0.00 - 0.20 Final     Has been drinking 45-50 oz of fluid a day  Not taking NSAIDs  Finerenone was added in Aug 2024, but she held it on Oct 16 2024    HTN  BP this visit 114/77 mmHg  BP at home 110s  Current medication: amlodipine 10 mg, carvedilol 25 mg BID, irbesartan 300 mg daily, imdur 30 mg daily, lasix 20 mg 4 times a week  Has been compliant with low salt diet  Stable weight    Type 2 DM  Lab Results   Component Value Date    HGBA1C 6.7 (H) 08/09/2024   Still on Trulicity once a week  Not on DM meds after started high protein diet    [facility] 04/2024  FINDINGS:  Right kidney: The right kidney measures 10.3 cm.  Mild cortical thinning.  Mild loss of corticomedullary distinction  Resistive index measures 0.8.  Normal perfusion.  No solid mass. No renal stone. No hydronephrosis.     Left kidney: The left kidney measures 10.4 cm.  Mild cortical thinning. Mild loss of corticomedullary distinction  Resistive index measures 0.79.  Normal perfusion. 4 mm nonobstructing left renal stone.  No solid  mass.  No hydronephrosis.     The bladder is partially distended without significant abnormality.     Splenic resistive index: 0.8.     Impression:     Bilateral medical renal disease.     Nonobstructing left renal stone.       Past Medical History:   Diagnosis Date    Age-related osteoporosis without current pathological fracture 2019    Anemia     Cataract     Chronic kidney disease, stage III (moderate) 2017    CKD (chronic kidney disease) stage 3, GFR 30-59 ml/min     DDD (degenerative disc disease), cervical 2024    Dry mouth     History of TIA (transient ischemic attack) 2018    Hyperlipidemia 2014    Hypertension     Morbid obesity with body mass index (BMI) of 40.0 or higher 2016    KAMRAN (obstructive sleep apnea)     KAMRAN on CPAP 2016    Osteoporosis without current pathological fracture 2018    Physical deconditioning 2018    Status post total left knee replacement 2017    Type 2 diabetes mellitus with stage 3 chronic kidney disease, without long-term current use of insulin 2019       Past Surgical History:   Procedure Laterality Date    ankle surgery (l) Left     APPENDECTOMY      CATARACT EXTRACTION W/  INTRAOCULAR LENS IMPLANT Right 2024         SECTION      CORONARY ARTERY BYPASS GRAFT  09/2019    x 2    CORONARY ARTERY BYPASS GRAFT (CABG) N/A 2019    Procedure: CORONARY ARTERY BYPASS GRAFT (CABG)X3;  Surgeon: Peterson Ventura MD;  Location: [facility] OR [address] [place] [address];  Service: Cardiovascular;  Laterality: N/A;    CORONARY BYPASS GRAFT ANGIOGRAPHY  10/18/2021    Procedure: Bypass graft study;  Surgeon: Jamar Haddad MD;  Location: [facility] CATH LAB;  Service: Cardiology;;    DILATION AND CURETTAGE OF UTERUS      ENDOSCOPIC HARVEST OF VEIN Left 2019    Procedure: SURGICAL PROCUREMENT, VEIN, ENDOSCOPIC;  Surgeon: Peterson Ventura MD;  Location: [facility] OR [address] [place] [address];  Service: Cardiovascular;  Laterality:  Left;  Vein [place] Start: 0843; End: 1054   Vein Prep Start: 1055; End: 1145    INTRAOCULAR PROSTHESES INSERTION Right 11/06/2024    Procedure: INSERTION, IOL PROSTHESIS;  Surgeon: Sylvia Dela Cruz MD;  Location: [facility] OR 1ST FLR;  Service: Ophthalmology;  Laterality: Right;    JOINT REPLACEMENT Left     knee replacement    KNEE ARTHROSCOPY W/ DEBRIDEMENT      KNEE SURGERY Left 05/01/2017    TKR    LEFT HEART CATHETERIZATION Left 07/09/2019    Procedure: Left heart cath;  Surgeon: Ronak Gibbons MD;  Location: [facility] CATH LAB;  Service: Cardiology;  Laterality: Left;    LEFT HEART CATHETERIZATION Left 10/18/2021    Procedure: Left heart cath;  Surgeon: Jamar Haddad MD;  Location: [facility] CATH LAB;  Service: Cardiology;  Laterality: Left;    PHACOEMULSIFICATION OF CATARACT Right 11/06/2024    Procedure: PHACOEMULSIFICATION, CATARACT;  Surgeon: Sylvia Dela Cruz MD;  Location: [facility] OR 1ST FLR;  Service: Ophthalmology;  Laterality: Right;    TOTAL KNEE ARTHROPLASTY Right 03/09/2023    Procedure: ARTHROPLASTY, KNEE, TOTAL:RIGHT;  Surgeon: Peterson Sharma MD;  Location: [facility] OR 2ND FLR;  Service: Orthopedics;  Laterality: Right;    TRANSFORAMINAL EPIDURAL INJECTION OF STEROID Left 11/15/2023    Procedure: LUMBAR TRANSFORAMINAL LEFT L2/3 AND L3/4 DIRECT REFERRAL *PLAVIX CLEARANCE IN CHART*;  Surgeon: Frank Ken MD;  Location: [facility] PAIN MGT;  Service: Pain Management;  Laterality: Left;       Review of patient's allergies indicates:   Allergen Reactions    Bactrim [sulfamethoxazole-trimethoprim]      Generic version  Sulfa makes her sick    Keflex [cephalexin]      Turns orange         Social History     Socioeconomic History    Marital status:     Number of children: 1   Tobacco Use    Smoking status: Never     Passive exposure: Never    Smokeless tobacco: Never   Substance and Sexual Activity    Alcohol use: Yes     Alcohol/week: 1.0 standard drink of alcohol     Types: 1 Shots of liquor  per week     Comment: rare    Drug use: No    Sexual activity: Yes     Partners: Male     Birth control/protection: Post-menopausal   Social History Narrative     with a son living locally.  at [employer], Retired 5/18.     Social Drivers of Health     Financial Resource Strain: Low Risk  (11/22/2024)    Overall Financial Resource Strain (CARDIA)     Difficulty of Paying Living Expenses: Not hard at all   Food Insecurity: No Food Insecurity (11/22/2024)    Hunger Vital Sign     Worried About Running Out of Food in the Last Year: Never true     Ran Out of Food in the Last Year: Never true   Transportation Needs: No Transportation Needs (11/22/2024)    PRAPARE - Transportation     Lack of Transportation (Medical): No     Lack of Transportation (Non-Medical): No   Physical Activity: Sufficiently Active (1/9/2024)    Exercise Vital Sign     Days of Exercise per Week: 4 days     Minutes of Exercise per Session: 60 min   Stress: No Stress Concern Present (11/22/2024)    [employer] [place] [employer] - Occupational Stress Questionnaire     Feeling of Stress : Not at all   Housing Stability: Low Risk  (11/22/2024)    Housing Stability Vital Sign     Unable to Pay for Housing in the Last Year: No     Homeless in the Last Year: No       Family History   Problem Relation Name Age of Onset    Heart attack Mother      Heart disease Father      Stroke Father      Heart failure Father      Diabetes Father      Arrhythmia Father      No Known Problems Brother      Stroke Paternal Grandfather      No Known Problems Son      Breast cancer Neg Hx      Colon cancer Neg Hx      Ovarian cancer Neg Hx      Amblyopia Neg Hx      Blindness Neg Hx      Cancer Neg Hx      Cataracts Neg Hx      Glaucoma Neg Hx      Hypertension Neg Hx      Macular degeneration Neg Hx      Retinal detachment Neg Hx      Strabismus Neg Hx      Thyroid disease Neg Hx      Esophageal cancer Neg Hx         ROS negative  "except listed above    PHYSICAL EXAMINATION:  Blood pressure 114/77, pulse 93, weight 108.7 kg (239 lb 10.2 oz), last menstrual period 01/09/2001, SpO2 99%.  Constitutional - No acute distress  HEENT - Grossly normal  Neck - supple.   Cardiovascular - JVP 8 cm  Respiratory - Bilateral coarse breathing sound  Musculoskeletal - Peripheral edema no  Dermatologic/Skin - Skin warm and dry.  No rashes.    Neurologic - No acute neurological deficit  Psychiatric - AAOx3    Assessment and Plan  1. CKD Stage 3B:     Urine Protein Creatinine Ratios:    Prot/Creat Ratio, Urine   Date Value Ref Range Status   11/11/2024 Unable to calculate 0.00 - 0.20 Final   06/21/2024 Unable to calculate 0.00 - 0.20 Final   03/05/2024 0.10 0.00 - 0.20 Final         Acid-Base:   Lab Results   Component Value Date     11/20/2024    K 4.0 11/20/2024    CO2 24 11/20/2024     -Counseled to avoid NSAIDs  -Counseled to drink 45 oz a day  -Will continue irbesartan, Trulicity (pt/PCP is considering to switch Trulicity to a different meds, counseled to let me know if she begins to loose more weight on the new meds for more frequent labs/BP monitoring)  -Will hold finerenone due to the patient is worried about rising of her Cr and "foamy urine" based on our discussion    2. HTN: BP goal 130/80 mmHg  -Counseled for low salt diet  -Continue amlodipine 10 mg, carvedilol 25 mg BID, irbesartan 300 mg daily, imdur 30 mg daily, lasix 20 mg every other day    3. Bone mineral condition:   Lab Results   Component Value Date    PTH 55.4 06/21/2024    PTH 53.1 01/06/2022    CALCIUM 9.7 11/20/2024    CALCIUM 10.3 11/11/2024    PHOS 2.9 11/11/2024    PHOS 3.7 10/18/2024     Vit D, 25-Hydroxy   Date Value Ref Range Status   06/21/2024 47 30 - 96 ng/mL Final     Comment:     Vitamin D deficiency.........<10 ng/mL                              Vitamin D insufficiency......10-29 ng/mL       Vitamin D sufficiency........> or equal to 30 ng/mL  Vitamin D " toxicity............>100 ng/mL        Will continue to monitor    4. Anemia:   Lab Results   Component Value Date    HGB 11.1 (L) 11/20/2024    FERRITIN 466 (H) 10/18/2024    UIBC 227 08/23/2005    IRON 28 (L) 10/18/2024    TRANSFERRIN 173 (L) 10/18/2024    TIBC 256 10/18/2024    FESATURATED 11 (L) 10/18/2024   Continue Ferrous 325 mg daily  Advised to discuss with the PCP regarding colonoscopy    5. Type 2 DM:  Last HbA1C   Lab Results   Component Value Date    HGBA1C 6.7 (H) 08/09/2024     Counseled the patient regarding the importance of sugar control to slow CKD progression     6. Lipid management:   Lab Results   Component Value Date    LDLCALC 40.2 (L) 08/09/2024   Continue statin.    7. HFpEF  Counseled the patient to watch and keep the weight stable as unstable fluid status can affect the kidney function.    8. Severe obesity  Counseled for weight loss since the weight is associated with CKD progression    ESRD planing when eGFR < 15     Follow up in 5 months

## 2024-12-11 ENCOUNTER — OFFICE VISIT (OUTPATIENT)
Dept: NEPHROLOGY | Facility: CLINIC | Age: 79
End: 2024-12-11
Payer: MEDICARE

## 2024-12-11 VITALS
DIASTOLIC BLOOD PRESSURE: 77 MMHG | WEIGHT: 239.63 LBS | HEART RATE: 93 BPM | BODY MASS INDEX: 43.83 KG/M2 | OXYGEN SATURATION: 99 % | SYSTOLIC BLOOD PRESSURE: 114 MMHG

## 2024-12-11 DIAGNOSIS — E66.01 SEVERE OBESITY (BMI >= 40): ICD-10-CM

## 2024-12-11 DIAGNOSIS — N18.32 STAGE 3B CHRONIC KIDNEY DISEASE: Primary | ICD-10-CM

## 2024-12-11 DIAGNOSIS — I10 HYPERTENSION, UNSPECIFIED TYPE: ICD-10-CM

## 2024-12-11 DIAGNOSIS — E11.69 HYPERLIPIDEMIA ASSOCIATED WITH TYPE 2 DIABETES MELLITUS: ICD-10-CM

## 2024-12-11 DIAGNOSIS — N18.32 TYPE 2 DIABETES MELLITUS WITH STAGE 3B CHRONIC KIDNEY DISEASE, UNSPECIFIED WHETHER LONG TERM INSULIN USE: ICD-10-CM

## 2024-12-11 DIAGNOSIS — E11.22 TYPE 2 DIABETES MELLITUS WITH STAGE 3B CHRONIC KIDNEY DISEASE, UNSPECIFIED WHETHER LONG TERM INSULIN USE: ICD-10-CM

## 2024-12-11 DIAGNOSIS — E78.5 HYPERLIPIDEMIA ASSOCIATED WITH TYPE 2 DIABETES MELLITUS: ICD-10-CM

## 2024-12-11 PROCEDURE — 99999 PR PBB SHADOW E&M-EST. PATIENT-LVL III: CPT | Mod: PBBFAC,,, | Performed by: INTERNAL MEDICINE

## 2024-12-11 PROCEDURE — 3074F SYST BP LT 130 MM HG: CPT | Mod: CPTII,S$GLB,, | Performed by: INTERNAL MEDICINE

## 2024-12-11 PROCEDURE — 1159F MED LIST DOCD IN RCRD: CPT | Mod: CPTII,S$GLB,, | Performed by: INTERNAL MEDICINE

## 2024-12-11 PROCEDURE — 1101F PT FALLS ASSESS-DOCD LE1/YR: CPT | Mod: CPTII,S$GLB,, | Performed by: INTERNAL MEDICINE

## 2024-12-11 PROCEDURE — 3078F DIAST BP <80 MM HG: CPT | Mod: CPTII,S$GLB,, | Performed by: INTERNAL MEDICINE

## 2024-12-11 PROCEDURE — 1126F AMNT PAIN NOTED NONE PRSNT: CPT | Mod: CPTII,S$GLB,, | Performed by: INTERNAL MEDICINE

## 2024-12-11 PROCEDURE — 99214 OFFICE O/P EST MOD 30 MIN: CPT | Mod: S$GLB,,, | Performed by: INTERNAL MEDICINE

## 2024-12-11 PROCEDURE — 1160F RVW MEDS BY RX/DR IN RCRD: CPT | Mod: CPTII,S$GLB,, | Performed by: INTERNAL MEDICINE

## 2024-12-11 PROCEDURE — 3288F FALL RISK ASSESSMENT DOCD: CPT | Mod: CPTII,S$GLB,, | Performed by: INTERNAL MEDICINE

## 2024-12-14 DIAGNOSIS — K59.1 FUNCTIONAL DIARRHEA: ICD-10-CM

## 2024-12-17 RX ORDER — DIPHENOXYLATE HYDROCHLORIDE AND ATROPINE SULFATE 2.5; .025 MG/1; MG/1
1 TABLET ORAL 4 TIMES DAILY PRN
Qty: 30 TABLET | Refills: 0 | Status: SHIPPED | OUTPATIENT
Start: 2024-12-17

## 2024-12-22 DIAGNOSIS — D64.9 NORMOCYTIC ANEMIA: ICD-10-CM

## 2024-12-23 DIAGNOSIS — I11.0 HYPERTENSIVE HEART DISEASE WITH CHRONIC DIASTOLIC CONGESTIVE HEART FAILURE: ICD-10-CM

## 2024-12-23 DIAGNOSIS — I50.32 HYPERTENSIVE HEART DISEASE WITH CHRONIC DIASTOLIC CONGESTIVE HEART FAILURE: ICD-10-CM

## 2024-12-23 RX ORDER — AMLODIPINE BESYLATE 10 MG/1
10 TABLET ORAL
Qty: 90 TABLET | Refills: 1 | Status: SHIPPED | OUTPATIENT
Start: 2024-12-23

## 2024-12-23 RX ORDER — FERROUS SULFATE 325(65) MG
TABLET ORAL
Qty: 90 TABLET | Refills: 1 | Status: SHIPPED | OUTPATIENT
Start: 2024-12-23

## 2024-12-23 NOTE — TELEPHONE ENCOUNTER
No care due was identified.  [facility] Embedded Care Due Messages. Reference number: [id number].   12/23/2024 12:29:22 PM CST

## 2025-01-05 DIAGNOSIS — I70.0 AORTIC ATHEROSCLEROSIS: ICD-10-CM

## 2025-01-05 DIAGNOSIS — I25.118 CORONARY ARTERY DISEASE OF NATIVE ARTERY OF NATIVE HEART WITH STABLE ANGINA PECTORIS: ICD-10-CM

## 2025-01-05 NOTE — PROGRESS NOTES
HPI     Post-op Evaluation            Comments: Pt states OD is doing well and denies any pain or other   symptoms          Comments    S/p Phaco IOL OD 11/6/24    1. NS OS  2. PVD OU  3. Retinal Drusen OU  4. Type 2 DM no DR  5. KAMRAN (on CPAP)  6. Essential HTN  7. Presbyopia  8. PCIOL OD    MEDS:  Finished drops          Last edited by Dayana Delgado MA on 1/9/2025  2:29 PM.            Assessment /Plan     For exam results, see Encounter Report.    Postoperative care for cataract    Nuclear sclerotic cataract of left eye    Cortical senile cataract of left eye    Abnormal red reflex of eye    Type 2 diabetes mellitus without ophthalmic manifestations    PVD (posterior vitreous detachment), both eyes    Pseudophakia, right eye    KAMRAN on CPAP    Anisometropia          This patient is the wife of a long standing glaucoma patient of mine   Want to continue with me for yearly checks       PVD (posterior vitreous detachment), bilateral  Retinal drusen of both eyes  Arcus senilis of both corneas  Type 2 diabetes mellitus without ophthalmic manifestations  Essential hypertension              No retinopathy, monitor yearly   // OD done 11/6/2024             visually significant at this time OD>OS     Consider cat sx OD first     KAMRAN on CPAP     Presbyopia              Rx specs    Pt C/O blurry vision and glare - OD>OS   Has been getting worse over the last year   Visually sign OU -     Discussed options and pt would like to have phaco/IOL od first -   Pt is hyperopic with slight stigmatism ou   Discussed IOL options   Pt declines a mutifocal or toric IOL   Prefers a monofocal IOL - set for distance plano to -0.5     Pt is very anxious and does not want to be aware of anything that is going on. She is requesting general anesthesia if possible. She is very squeamish about her eyes and is afraid she will not be able to be still.     HOLD TRULICITY FOR > 1 WEEK BEFORE SURGERY - pt is aware that if she does not hold this that the  surgery will have to be canceled and re-scheduled - because of a increase in anesthetic risk .     IOL calcs   OD  DIB00 24.0  MTA4U0 20.0    Pt is referring 2 patients to me   Beena Nicole - h/o poor result post CE elsewhere   ? Harriet Moraes ( I use to see this patients mother Ms Eugenia treviño - who has passed)     Phaco / IOL OD Date: 11/6/2024 - general anesthesia -   POW  # 9   - phaco/IOL   Off gtts       Pt feels off balance - decrease depth perception   Would like CE - 2nd eye - OS   Wants sx feb 26th     F/U pre-op phaco / IOL os - wants GENERAL ANESTHESIA AGAIN - very anxious

## 2025-01-05 NOTE — TELEPHONE ENCOUNTER
No care due was identified.  [facility] Embedded Care Due Messages. Reference number: [id number].   1/05/2025 5:20:21 PM CST

## 2025-01-08 RX ORDER — ATORVASTATIN CALCIUM 80 MG/1
80 TABLET, FILM COATED ORAL DAILY
Qty: 90 TABLET | Refills: 1 | Status: SHIPPED | OUTPATIENT
Start: 2025-01-08

## 2025-01-09 ENCOUNTER — PATIENT MESSAGE (OUTPATIENT)
Dept: PHARMACY | Facility: CLINIC | Age: 80
End: 2025-01-09
Payer: MEDICARE

## 2025-01-09 ENCOUNTER — OFFICE VISIT (OUTPATIENT)
Dept: OPHTHALMOLOGY | Facility: CLINIC | Age: 80
End: 2025-01-09
Payer: MEDICARE

## 2025-01-09 DIAGNOSIS — H43.813 PVD (POSTERIOR VITREOUS DETACHMENT), BOTH EYES: ICD-10-CM

## 2025-01-09 DIAGNOSIS — H25.12 NUCLEAR SCLEROTIC CATARACT OF LEFT EYE: ICD-10-CM

## 2025-01-09 DIAGNOSIS — Z48.810 POSTOPERATIVE CARE FOR CATARACT: Primary | ICD-10-CM

## 2025-01-09 DIAGNOSIS — H25.012 CORTICAL SENILE CATARACT OF LEFT EYE: ICD-10-CM

## 2025-01-09 DIAGNOSIS — G47.33 OSA ON CPAP: ICD-10-CM

## 2025-01-09 DIAGNOSIS — E11.9 TYPE 2 DIABETES MELLITUS WITHOUT OPHTHALMIC MANIFESTATIONS: ICD-10-CM

## 2025-01-09 DIAGNOSIS — Z96.1 PSEUDOPHAKIA, RIGHT EYE: ICD-10-CM

## 2025-01-09 DIAGNOSIS — Z98.49 POSTOPERATIVE CARE FOR CATARACT: Primary | ICD-10-CM

## 2025-01-09 DIAGNOSIS — H57.89 ABNORMAL RED REFLEX OF EYE: ICD-10-CM

## 2025-01-09 DIAGNOSIS — H52.31 ANISOMETROPIA: ICD-10-CM

## 2025-01-09 PROCEDURE — 99024 POSTOP FOLLOW-UP VISIT: CPT | Mod: S$GLB,,, | Performed by: OPHTHALMOLOGY

## 2025-01-09 PROCEDURE — 3288F FALL RISK ASSESSMENT DOCD: CPT | Mod: CPTII,S$GLB,, | Performed by: OPHTHALMOLOGY

## 2025-01-09 PROCEDURE — 1101F PT FALLS ASSESS-DOCD LE1/YR: CPT | Mod: CPTII,S$GLB,, | Performed by: OPHTHALMOLOGY

## 2025-01-09 PROCEDURE — 1126F AMNT PAIN NOTED NONE PRSNT: CPT | Mod: CPTII,S$GLB,, | Performed by: OPHTHALMOLOGY

## 2025-01-09 PROCEDURE — 99999 PR PBB SHADOW E&M-EST. PATIENT-LVL III: CPT | Mod: PBBFAC,,, | Performed by: OPHTHALMOLOGY

## 2025-01-09 PROCEDURE — 1160F RVW MEDS BY RX/DR IN RCRD: CPT | Mod: CPTII,S$GLB,, | Performed by: OPHTHALMOLOGY

## 2025-01-09 PROCEDURE — 1159F MED LIST DOCD IN RCRD: CPT | Mod: CPTII,S$GLB,, | Performed by: OPHTHALMOLOGY

## 2025-01-09 NOTE — Clinical Note
Phaco/IOL os - general anesthesia again - 2nd eye  Requesting Feb 26th  ? Pre-op Feb 13  [place] [phone]  [facility] [phone]

## 2025-01-11 ENCOUNTER — PATIENT MESSAGE (OUTPATIENT)
Dept: PRIMARY CARE CLINIC | Facility: CLINIC | Age: 80
End: 2025-01-11
Payer: MEDICARE

## 2025-01-13 ENCOUNTER — PATIENT MESSAGE (OUTPATIENT)
Dept: PHARMACY | Facility: CLINIC | Age: 80
End: 2025-01-13
Payer: MEDICARE

## 2025-01-14 ENCOUNTER — PATIENT MESSAGE (OUTPATIENT)
Dept: ENDOCRINOLOGY | Facility: CLINIC | Age: 80
End: 2025-01-14
Payer: MEDICARE

## 2025-01-16 ENCOUNTER — TELEPHONE (OUTPATIENT)
Dept: OPHTHALMOLOGY | Facility: CLINIC | Age: 80
End: 2025-01-16
Payer: MEDICARE

## 2025-01-16 DIAGNOSIS — H25.12 NUCLEAR SCLEROTIC CATARACT OF LEFT EYE: Primary | ICD-10-CM

## 2025-01-17 ENCOUNTER — PATIENT MESSAGE (OUTPATIENT)
Dept: OPHTHALMOLOGY | Facility: CLINIC | Age: 80
End: 2025-01-17
Payer: MEDICARE

## 2025-01-17 NOTE — TELEPHONE ENCOUNTER
I spoke to pt and advised her that her friend did speak to our triage nurse and was scheduled appropriately I advised pt that  that I could not advise her on her friend's condition but that I would send the triage staff another message to call her. They will triage it and if she needs to be seen sooner than they will schedule it. Ms. Romero advised that  does not see urgent pts or non-glaucoma patients.

## 2025-01-19 ENCOUNTER — PATIENT MESSAGE (OUTPATIENT)
Dept: PODIATRY | Facility: CLINIC | Age: 80
End: 2025-01-19
Payer: MEDICARE

## 2025-01-24 ENCOUNTER — LAB VISIT (OUTPATIENT)
Dept: LAB | Facility: HOSPITAL | Age: 80
End: 2025-01-24
Attending: INTERNAL MEDICINE
Payer: MEDICARE

## 2025-01-24 ENCOUNTER — OFFICE VISIT (OUTPATIENT)
Dept: PRIMARY CARE CLINIC | Facility: CLINIC | Age: 80
End: 2025-01-24
Payer: MEDICARE

## 2025-01-24 VITALS
DIASTOLIC BLOOD PRESSURE: 70 MMHG | BODY MASS INDEX: 43.08 KG/M2 | SYSTOLIC BLOOD PRESSURE: 134 MMHG | RESPIRATION RATE: 19 BRPM | HEIGHT: 62 IN | OXYGEN SATURATION: 98 % | WEIGHT: 234.13 LBS | HEART RATE: 89 BPM

## 2025-01-24 DIAGNOSIS — E11.22 TYPE 2 DIABETES MELLITUS WITH STAGE 3A CHRONIC KIDNEY DISEASE, WITHOUT LONG-TERM CURRENT USE OF INSULIN: ICD-10-CM

## 2025-01-24 DIAGNOSIS — N18.31 TYPE 2 DIABETES MELLITUS WITH STAGE 3A CHRONIC KIDNEY DISEASE, WITHOUT LONG-TERM CURRENT USE OF INSULIN: ICD-10-CM

## 2025-01-24 DIAGNOSIS — I25.118 CORONARY ARTERY DISEASE OF NATIVE ARTERY OF NATIVE HEART WITH STABLE ANGINA PECTORIS: ICD-10-CM

## 2025-01-24 DIAGNOSIS — I70.0 AORTIC ATHEROSCLEROSIS: ICD-10-CM

## 2025-01-24 DIAGNOSIS — I50.32 HYPERTENSIVE HEART DISEASE WITH CHRONIC DIASTOLIC CONGESTIVE HEART FAILURE: Primary | ICD-10-CM

## 2025-01-24 DIAGNOSIS — F41.8 SITUATIONAL ANXIETY: ICD-10-CM

## 2025-01-24 DIAGNOSIS — N18.32 TYPE 2 DIABETES MELLITUS WITH STAGE 3B CHRONIC KIDNEY DISEASE, WITHOUT LONG-TERM CURRENT USE OF INSULIN: ICD-10-CM

## 2025-01-24 DIAGNOSIS — M81.0 OSTEOPOROSIS WITHOUT CURRENT PATHOLOGICAL FRACTURE, UNSPECIFIED OSTEOPOROSIS TYPE: ICD-10-CM

## 2025-01-24 DIAGNOSIS — E66.813 CLASS 3 SEVERE OBESITY DUE TO EXCESS CALORIES WITH SERIOUS COMORBIDITY AND BODY MASS INDEX (BMI) OF 40.0 TO 44.9 IN ADULT: ICD-10-CM

## 2025-01-24 DIAGNOSIS — E66.01 CLASS 3 SEVERE OBESITY DUE TO EXCESS CALORIES WITH SERIOUS COMORBIDITY AND BODY MASS INDEX (BMI) OF 40.0 TO 44.9 IN ADULT: ICD-10-CM

## 2025-01-24 DIAGNOSIS — G95.20 CERVICAL CORD COMPRESSION WITH MYELOPATHY: ICD-10-CM

## 2025-01-24 DIAGNOSIS — I11.0 HYPERTENSIVE HEART DISEASE WITH CHRONIC DIASTOLIC CONGESTIVE HEART FAILURE: Primary | ICD-10-CM

## 2025-01-24 DIAGNOSIS — D63.8 ANEMIA OF CHRONIC DISEASE: ICD-10-CM

## 2025-01-24 DIAGNOSIS — K59.1 FUNCTIONAL DIARRHEA: ICD-10-CM

## 2025-01-24 DIAGNOSIS — E11.22 TYPE 2 DIABETES MELLITUS WITH STAGE 3B CHRONIC KIDNEY DISEASE, WITHOUT LONG-TERM CURRENT USE OF INSULIN: ICD-10-CM

## 2025-01-24 LAB
ALBUMIN SERPL BCP-MCNC: 3.5 G/DL (ref 3.5–5.2)
ALP SERPL-CCNC: 77 U/L (ref 40–150)
ALT SERPL W/O P-5'-P-CCNC: 18 U/L (ref 10–44)
ANION GAP SERPL CALC-SCNC: 11 MMOL/L (ref 8–16)
AST SERPL-CCNC: 23 U/L (ref 10–40)
BILIRUB SERPL-MCNC: 0.4 MG/DL (ref 0.1–1)
BUN SERPL-MCNC: 21 MG/DL (ref 8–23)
CALCIUM SERPL-MCNC: 10 MG/DL (ref 8.7–10.5)
CHLORIDE SERPL-SCNC: 104 MMOL/L (ref 95–110)
CO2 SERPL-SCNC: 24 MMOL/L (ref 23–29)
CREAT SERPL-MCNC: 1.2 MG/DL (ref 0.5–1.4)
EST. GFR  (NO RACE VARIABLE): 46.3 ML/MIN/1.73 M^2
ESTIMATED AVG GLUCOSE: 146 MG/DL (ref 68–131)
GLUCOSE SERPL-MCNC: 115 MG/DL (ref 70–110)
HBA1C MFR BLD: 6.7 % (ref 4–5.6)
POTASSIUM SERPL-SCNC: 4.6 MMOL/L (ref 3.5–5.1)
PROT SERPL-MCNC: 8.5 G/DL (ref 6–8.4)
SODIUM SERPL-SCNC: 139 MMOL/L (ref 136–145)

## 2025-01-24 PROCEDURE — G2211 COMPLEX E/M VISIT ADD ON: HCPCS | Mod: S$GLB,,, | Performed by: INTERNAL MEDICINE

## 2025-01-24 PROCEDURE — 80053 COMPREHEN METABOLIC PANEL: CPT | Performed by: NURSE PRACTITIONER

## 2025-01-24 PROCEDURE — 99999 PR PBB SHADOW E&M-EST. PATIENT-LVL V: CPT | Mod: PBBFAC,,, | Performed by: INTERNAL MEDICINE

## 2025-01-24 PROCEDURE — 3288F FALL RISK ASSESSMENT DOCD: CPT | Mod: CPTII,S$GLB,, | Performed by: INTERNAL MEDICINE

## 2025-01-24 PROCEDURE — 1160F RVW MEDS BY RX/DR IN RCRD: CPT | Mod: CPTII,S$GLB,, | Performed by: INTERNAL MEDICINE

## 2025-01-24 PROCEDURE — 3078F DIAST BP <80 MM HG: CPT | Mod: CPTII,S$GLB,, | Performed by: INTERNAL MEDICINE

## 2025-01-24 PROCEDURE — 1101F PT FALLS ASSESS-DOCD LE1/YR: CPT | Mod: CPTII,S$GLB,, | Performed by: INTERNAL MEDICINE

## 2025-01-24 PROCEDURE — 3075F SYST BP GE 130 - 139MM HG: CPT | Mod: CPTII,S$GLB,, | Performed by: INTERNAL MEDICINE

## 2025-01-24 PROCEDURE — 83036 HEMOGLOBIN GLYCOSYLATED A1C: CPT | Performed by: NURSE PRACTITIONER

## 2025-01-24 PROCEDURE — 3072F LOW RISK FOR RETINOPATHY: CPT | Mod: CPTII,S$GLB,, | Performed by: INTERNAL MEDICINE

## 2025-01-24 PROCEDURE — 1126F AMNT PAIN NOTED NONE PRSNT: CPT | Mod: CPTII,S$GLB,, | Performed by: INTERNAL MEDICINE

## 2025-01-24 PROCEDURE — 99214 OFFICE O/P EST MOD 30 MIN: CPT | Mod: S$GLB,,, | Performed by: INTERNAL MEDICINE

## 2025-01-24 PROCEDURE — 1159F MED LIST DOCD IN RCRD: CPT | Mod: CPTII,S$GLB,, | Performed by: INTERNAL MEDICINE

## 2025-01-24 RX ORDER — IRBESARTAN 300 MG/1
300 TABLET ORAL NIGHTLY
Qty: 90 TABLET | Refills: 2 | Status: SHIPPED | OUTPATIENT
Start: 2025-01-24

## 2025-01-24 RX ORDER — BUSPIRONE HYDROCHLORIDE 5 MG/1
5 TABLET ORAL 2 TIMES DAILY
Qty: 60 TABLET | Refills: 2 | Status: SHIPPED | OUTPATIENT
Start: 2025-01-24

## 2025-01-24 RX ORDER — CARVEDILOL 25 MG/1
25 TABLET ORAL 2 TIMES DAILY
Qty: 180 TABLET | Refills: 2 | Status: SHIPPED | OUTPATIENT
Start: 2025-01-24
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
## 2025-01-25 DIAGNOSIS — I11.0 HYPERTENSIVE HEART DISEASE WITH CHRONIC DIASTOLIC CONGESTIVE HEART FAILURE: ICD-10-CM

## 2025-01-25 DIAGNOSIS — I50.32 HYPERTENSIVE HEART DISEASE WITH CHRONIC DIASTOLIC CONGESTIVE HEART FAILURE: ICD-10-CM

## 2025-01-26 RX ORDER — CARVEDILOL 25 MG/1
25 TABLET ORAL 2 TIMES DAILY WITH MEALS
Qty: 180 TABLET | Refills: 2 | OUTPATIENT
Start: 2025-01-26

## 2025-01-26 NOTE — TELEPHONE ENCOUNTER
No care due was identified.  [facility] Embedded Care Due Messages. Reference number: [id number].   1/25/2025 10:59:05 PM CST

## 2025-01-27 NOTE — TELEPHONE ENCOUNTER
Refill Decision Note   Kaylee Romero  is requesting a refill authorization.  Brief Assessment and Rationale for Refill:  Quick Discontinue     Medication Therapy Plan:  Receipt confirmed by pharmacy (1/24/2025  4:36 PM CST)      Comments:     Note composed:10:05 PM 01/26/2025

## 2025-01-29 ENCOUNTER — OFFICE VISIT (OUTPATIENT)
Dept: PODIATRY | Facility: CLINIC | Age: 80
End: 2025-01-29
Payer: MEDICARE

## 2025-01-29 VITALS
HEIGHT: 62 IN | HEART RATE: 82 BPM | WEIGHT: 234.13 LBS | DIASTOLIC BLOOD PRESSURE: 56 MMHG | BODY MASS INDEX: 43.08 KG/M2 | SYSTOLIC BLOOD PRESSURE: 112 MMHG

## 2025-01-29 DIAGNOSIS — B35.1 DERMATOPHYTOSIS OF NAIL: ICD-10-CM

## 2025-01-29 DIAGNOSIS — E11.49 TYPE II DIABETES MELLITUS WITH NEUROLOGICAL MANIFESTATIONS: Primary | ICD-10-CM

## 2025-01-29 PROCEDURE — 99999 PR PBB SHADOW E&M-EST. PATIENT-LVL IV: CPT | Mod: PBBFAC,,, | Performed by: PODIATRIST

## 2025-01-29 NOTE — PROGRESS NOTES
"  Chief Concern: Diabetic Foot Exam (Foot Exam/PCP Liz Roberts MD  07/11/24)      HPI:  Kaylee Romero is a 80 y.o. female presents for foot exam and care.       PCP:  Liz Roberts MD, Diabetic Foot Exam (Foot Exam/PCP Liz Roberts MD  07/11/24)       Patient Active Problem List   Diagnosis    Essential hypertension    Class 3 severe obesity due to excess calories with serious comorbidity and body mass index (BMI) of 40.0 to 44.9 in adult    KAMRAN on CPAP    Chronic kidney disease, stage III (moderate)    History of total knee arthroplasty, left    Osteoporosis without current pathological fracture    History of TIA (transient ischemic attack)    Age-related osteoporosis without current pathological fracture    Vitamin D deficiency, unspecified    Type 2 diabetes mellitus with stage 3 chronic kidney disease, without long-term current use of insulin    Coronary 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 pectoris    S/P CABG (coronary artery bypass graft)    Shoulder pain    Chronic heart failure with preserved ejection fraction    Decreased range of motion of right knee    Gait, antalgic    Purpura    Degenerative cervical spinal stenosis    Cervical myelopathy    DDD (degenerative disc disease), cervical    Status post total knee replacement, right    Hyperlipidemia    Nonrheumatic aortic valve stenosis    Nuclear sclerotic cataract of right eye           Current Outpatient Medications on File Prior to Visit   Medication Sig Dispense Refill    acetaminophen (TYLENOL) 500 MG tablet Take 500 mg by mouth 2 (two) times daily as needed for Pain.      amLODIPine (NORVASC) 10 MG tablet TAKE 1 TABLET(10 MG) BY MOUTH EVERY DAY 90 tablet 1    atorvastatin (LIPITOR) 80 MG tablet Take 1 tablet (80 mg total) by mouth once daily. 90 tablet 1    BD BOO 2ND GEN PEN NEEDLE 32 gauge x 5/32" Ndle To injection daily 100 each 8    blood sugar diagnostic Strp 1 strip by " Misc.(Non-Drug; Combo Route) route once daily. 100 strip 3    blood-glucose meter,continuous (DEXCOM G7 ) Misc To use with sensor 1 each 0    blood-glucose sensor (DEXCOM G7 SENSOR) Areli To change every 10 days 3 each 3    busPIRone (BUSPAR) 5 MG Tab Take 1 tablet (5 mg total) by mouth 2 (two) times daily. For Anxiety. 60 tablet 2    carvediloL (COREG) 25 MG tablet Take 1 tablet (25 mg total) by mouth 2 (two) times daily. 180 tablet 2    clopidogreL (PLAVIX) 75 mg tablet Take 1 tablet (75 mg total) by mouth once daily. 90 tablet 3    diphenoxylate-atropine 2.5-0.025 mg (LOMOTIL) 2.5-0.025 mg per tablet TAKE 1 TABLET BY MOUTH FOUR TIMES DAILY AS NEEDED FOR DIARRHEA 30 tablet 0    dulaglutide (TRULICITY) 4.5 mg/0.5 mL pen injector Inject 4.5 mg into the skin every 7 days. 4 pen 37    FEROSUL 325 mg (65 mg iron) Tab tablet TAKE 1 TABLET BY MOUTH DAILY 90 tablet 1    furosemide (LASIX) 20 MG tablet TAKE 1 TABLET BY MOUTH ON SATURDAY, SUNDAY, MONDAY, WEDNESDAY, FRIDAY 90 tablet 3    gabapentin (NEURONTIN) 100 MG capsule Take 1-2 capsules (100-200 mg total) by mouth every evening. 90 capsule 2    glucagon (GVOKE HYPOPEN 2-PACK) 1 mg/0.2 mL AtIn Inject 1 Package into the skin as needed. 2 Syringe 0    irbesartan (AVAPRO) 300 MG tablet Take 1 tablet (300 mg total) by mouth every evening. 90 tablet 2    isosorbide mononitrate (IMDUR) 30 MG 24 hr tablet Take 2 tablets (60 mg total) by mouth once daily. 180 tablet 3    LIDOcaine (LIDODERM) 5 % Place 1 patch onto the skin once daily. Remove & Discard patch within 12 hours or as directed by MD [place] patch 0    multivitamin (THERAGRAN) per tablet Take 1 tablet by mouth once daily.      nitroGLYCERIN (NITROSTAT) 0.4 MG SL tablet Place 1 tablet (0.4 mg total) under the tongue every 5 (five) minutes as needed for Chest pain. 100 tablet 1    vitamin D (VITAMIN D3) 1000 units Tab Take 1,000 Units by mouth twice a week. Monday AND THURSDAYS ONLY      aspirin (ECOTRIN) 81 MG EC  "tablet Take 1 tablet (81 mg total) by mouth 2 (two) times a day. (Patient taking differently: Take 81 mg by mouth once daily.) 60 tablet 0    glipiZIDE (GLUCOTROL) 5 MG tablet Take 2 tablets (10 mg total) by mouth 2 (two) times daily with meals. 360 tablet 3     Current Facility-Administered Medications on File Prior to Visit   Medication Dose Route Frequency Provider Last Rate Last Admin    0.9%  NaCl infusion   Intravenous Continuous Doug De Anda MD             Review of patient's allergies indicates:   Allergen Reactions    Keflex [cephalexin] Other (See Comments)     Turns orange    Bactrim [sulfamethoxazole-trimethoprim]      Generic version  Sulfa makes her sick               Objective:        Vitals:    01/29/25 1113   BP: (!) 112/56   Pulse: 82   Weight: 106.2 kg (234 lb 2.1 oz)   Height: 5' 2" (1.575 m)       Physical Exam  Constitutional:       Appearance: She is well-developed.   Cardiovascular:      Comments: DP and PT pulses 2/4 loreta.   Edema noted loreta.   Capillary refill time < 3 seconds to digits 1-5, loreta.   Absent hair growth      Musculoskeletal:         General: Deformity present. No tenderness. Normal range of motion.      Comments: HAV noted to loreta 1st MPJ. 5/5 strength to all LE muscle groups. No POP noted     Skin:     Capillary Refill: Capillary refill takes 2 to 3 seconds.      Findings: No erythema.      Comments: Toenails x 10 are elongated by 1-3mm, thickened by 1-3mm and mycotic with subungual debris.  Flaky skin on the soles.  No vesicles or redness.   Calluses sub 1st metatarsal head bilateral      Neurological:      Mental Status: She is alert and oriented to person, place, and time.      Comments: +paresthesias (Abnormal spontaneous sensations in feet)             Assessment:       Problem List Items Addressed This Visit    None  Visit Diagnoses       Type II diabetes mellitus with neurological manifestations    -  Primary    Dermatophytosis of nail                Plan:     I " "counseled the patient on the patient's conditions, their implications and medical management.  Annual diabetes foot exam.   Shoe inspection.     Maintain proper foot hygiene.   Continue wearing proper shoe gear, daily foot inspections, never walking without protective shoe gear, never putting sharp instruments to feet.    Routine Foot Care    Date/Time: 1/29/2025 11:15 AM    Performed by: Donna Gillis DPM  Authorized by: Donna Gillis DPM    Time out: Immediately prior to procedure a "time out" was called to verify the correct patient, procedure, equipment, support staff and site/side marked as required.    Consent Done?:  Yes (Verbal)    Nail Care Type:  Debride  Location(s): All  (Left 1st Toe, Left 3rd Toe, Left 2nd Toe, Left 4th Toe, Left 5th Toe, Right 1st Toe, Right 2nd Toe, Right 3rd Toe, Right 4th Toe and Right 5th Toe)  Patient tolerance:  Patient tolerated the procedure well with no immediate complications     With patient's permission, the toenails mentioned above were reduced and debrided using a nail nipper, removing offending nail and debris.   Utilizing a #15 scalpel, I trimmed the corns and calluses at the above mentioned location.      The patient will continue to monitor the areas daily, inspect the feet, wear protective shoe gear when ambulatory, and moisturizer to maintain skin integrity.               "

## 2025-01-31 ENCOUNTER — PATIENT MESSAGE (OUTPATIENT)
Dept: ENDOCRINOLOGY | Facility: CLINIC | Age: 80
End: 2025-01-31
Payer: MEDICARE

## 2025-02-03 NOTE — PROGRESS NOTES
"Patient ID: Kaylee Romero is a 80 y.o. female    Chief Complaint:   No chief complaint on file.    HPI: Kaylee Romero with type 2 diabetes complicated by CAD s/p CABG, TIAs, hypertension, dyslipidemia, CKD Stage 3, obesity, osteoporosis, and KAMRAN presents for follow up of Type 2 diabetes and osteoporosis. Previous patient of mine. Last visit in Aug 2024.    She had right knee arthroscope in March 2023. She is no longer using walker or cane.     She has been able to stop insulin but had to resume glipizide with biggest meal of the day.     At her last visit we increased her glipizide added finerenone but she stopped rising of her and "foamy urine" and possible symptoms of UTI     Does report pain in her ribs bilaterally as well and was dx with UTI.     Since her last visit she also had COVID.    Since her last visit we have stopped fosamax in Oct 2024 due to need for dental extraction will have in April 2025. She is due for bone density in June 2025    She has had problems with dexcom failure and sensor adhesiveness.     Denies falls since her last visit     Wt Readings from Last 3 Encounters:   02/04/25 1310 105.4 kg (232 lb 5.8 oz)   01/29/25 1113 106.2 kg (234 lb 2.1 oz)   01/24/25 1352 106.2 kg (234 lb 2.1 oz)     Since her last visit she stopped Trulicity and started ozempic.    With regards to the diabetes:     Diagnosed with Type 2 diabetes: 2013  Family history of Type 2 diabetes: father  Known complications:  DKA-  RN-; she had cataract surgery on one eye since her last visit and has upcoming in Feb 2025  PN- seeing podiatry 1/29/2025; denies tingling in her hands   Nephropathy: now seeing nephrology 12/2024  Gastroparesis: denies   CAD: 3 MI and one stroke     Current regimen:  Trulicity 4.5 mg weekly /Ozempic 2 mg weekly -whichever she can find  Glipizide 10 mg twice daily with meals     NOVOLOG PRN steroid injection-none recent    Other medications tried:  Jardiance-insurance " "issues  Brad Whitlock - did not want to start due to SE profile  Trulicity    finerenone but she stopped rising of her and "foamy urine" and possible symptoms of UTI     Glucose Monitor:  Dexcom G7  Dexcom: average blood sugar 150; <1% VERY LOW; 0% low; 82% in range; 18% high; 0% very high  Dexcom:higher blood sugars during the day   Reviewed 2 weeks ago and blood sugars were at goal.    Hypoglycemia: none and denies hypoglycemia   Denies symptoms of hypoglycemia-hypoglycemia unawareness     Knows how to correct with 15 grams of carbs- juice, coke, or a peppermint.      Diet/Exercise: She was seeing a dietician -Jimmy. She is now on protein shakes and food -careful with her food choices.   Usually gives up sweets for LENT.   Strawberries and prunes to treat hypoglycemia.  Snacks: more sweets lately  Drinks: mostly water  Exercise - she is doing bike 60 minutes 1 times a week and planning on increasing     Education - last visit: saw in Nov 2023  Has GVOKE    Denies history of pancreatitis & personal/family history of medullary thyroid cancer.    Lab Results   Component Value Date    HGBA1C 6.7 (H) 01/24/2025    HGBA1C 6.7 (H) 08/09/2024    HGBA1C 7.1 (H) 04/09/2024     Screening or Prevention Patient's value Goal Complete/Controlled?   HgA1C Testing and Control   Lab Results   Component Value Date    HGBA1C 6.7 (H) 01/24/2025      Annually/Less than 8% Yes   Lipid profile : 08/09/2024 Annually Yes   LDL control Lab Results   Component Value Date    LDLCALC 40.2 (L) 08/09/2024    Annually/Less than 100 mg/dl  Yes   Nephropathy screening Lab Results   Component Value Date    LABMICR 199.0 10/14/2024     Lab Results   Component Value Date    PROTEINUA Negative 01/24/2025    Annually No   Blood pressure BP Readings from Last 1 Encounters:   02/04/25 110/74    Less than 140/90 Yes   Dilated retinal exam : 10/07/2024 Annually Yes   Foot exam   : 10/02/2024 Annually Yes     With regards to osteoporosis:   Current " meds: Fosamax  Started: 6/2019  Stopped 9/2024    She will have dental extraction in April 2025    Family history: mother     Calcium intake- no calcium supplementation; has in diet   Vit D intake-  1000 twice weekly   Weight bearing exercise-   Walking as tolerated with knee pain    Recent falls- August 2018; September 2018 - no fractures and no falls from a standing position.      Steroid injection in spine around 11/15/23  Numbness caused falls below   3 falls in 6 weeks from Oct 2023-Nov 2023   Pain med steroids in Nov 2023     They have made fall precautions in house   No recent fractures   No significant height loss (>2 inches)     tob use -  None  etoh use- some 1-2 glasses of wine every once in awhile     No current diarrhea or h/o malabsorption     Menopause at age 56     Denies chronic exposure to anticoagulants, proton pump inhibitors or antiseizure medications.   +Steroid injections knees     She is using walker at home  She is using wheelchair in clinic    Denies GERD, indigestion, or gastric bypass surgery.      Denies history of thyroid disease, anemia, kidney stones or kidney disease.      Denies active malignancy, history of malignancy the involved the bone, prior radiation treatment, or unexplained elevations of alk phos on labs      BMD 6/9/2023  FINDINGS:  Lumbar spine (L1-L4):               T-score is 0.7, and Z-score is 2.5.     Compared with previous DXA, BMD at the lumbar spine has remained stable.     Femoral neck:                          T-score is -1.9, and Z-score is -0.6.     Total hip:                                T-score is -0.8, and Z-score is 0.1.     Compared with previous DXA, BMD at the total hip has remained stable.     Distal 1/3 radius:                      Not applicable      Fracture Risk (FRAX)   7.7% risk of a major osteoporotic fracture in the next 10 years.   1.5% risk of hip fracture in the next 10 years.     Impression:   *Osteoporosis on treatment with Fosamax      RECOMMENDATIONS:  *Daily calcium intake 3892-8355 mg, dietary sources preferred; Vitamin D 5582-6251 IU daily.  *Weight bearing exercise and fall precautions.  *If the patient has been treated with an oral bisphosphonate for a period of at least 5 years and has not fractured, a drug holiday can be considered. Continuing treatment is also reasonable if the patient is deemed to be at high risk for fracture.  *Repeat BMD in 2 years.     With regards to the vitamin d deficiency and hypercalcemia,     Transient increase in calcium level in Sept 2021 that normalized when we decrease Vitamin D and stopped chlorthalidone     Currently taking otc 1000 twice weekly    Lab Results   Component Value Date    PTH 55.4 06/21/2024    CALCIUM 10.0 01/24/2025    PHOS 2.9 11/11/2024     Lab Results   Component Value Date    ALBUMIN 3.5 01/24/2025     Vit D, 25-Hydroxy   Date Value Ref Range Status   06/21/2024 47 30 - 96 ng/mL Final     Comment:     Vitamin D deficiency.........<10 ng/mL                              Vitamin D insufficiency......10-29 ng/mL       Vitamin D sufficiency........> or equal to 30 ng/mL  Vitamin D toxicity............>100 ng/mL       Denies constipation,   Has been having more stress lately   Denies depression    Increased polyuria due to lasix  + muscle aches or pains.    No recent fractures   Denies falls since her last visit     Has seen GI for diarrhea and does take Lomtil with some relief.     FIB-4 Calculation: 1.57 at 8/16/2024  2:01 PM     FIB-4 below 1.30 is considered as low-risk for advanced fibrosis  FIB-4 over 2.67 is considered as high-risk for advanced fibrosis  FIB-4 values between 1.30 and 2.67 are considered as intermediate-risk of advanced fibrosis for ages 36-64.     For ages > 64 the cut-off for low-risk goes to < 2.  This is a screening tool and clinical judgement should be used in the interpretation of these results.    Review of Systems   Constitutional:  Negative for fatigue and  "unexpected weight change.   Eyes:  Negative for visual disturbance.   Endocrine: Negative for polydipsia, polyphagia and polyuria.   Musculoskeletal:  Positive for arthralgias and myalgias. Negative for gait problem and neck pain.   Hematological:  Does not bruise/bleed easily.     As above    OBJECTIVE    Physical Exam  Constitutional:       Appearance: Normal appearance.   Neck:      Thyroid: No thyromegaly.   Pulmonary:      Effort: Pulmonary effort is normal.   Neurological:      Mental Status: She is alert.     injection sites are without edema or erythema. No lipo hypertropthy or atrophy  DM foot exam deferred; done in 10/2024    Wt Readings from Last 3 Encounters:   02/04/25 1310 105.4 kg (232 lb 5.8 oz)   01/29/25 1113 106.2 kg (234 lb 2.1 oz)   01/24/25 1352 106.2 kg (234 lb 2.1 oz)     Vitals:    02/04/25 1310   BP: 110/74   Pulse: 107         Current Outpatient Medications:     acetaminophen (TYLENOL) 500 MG tablet, Take 500 mg by mouth 2 (two) times daily as needed for Pain., Disp: , Rfl:     amLODIPine (NORVASC) 10 MG tablet, TAKE 1 TABLET(10 MG) BY MOUTH EVERY DAY, Disp: 90 tablet, Rfl: 1    atorvastatin (LIPITOR) 80 MG tablet, Take 1 tablet (80 mg total) by mouth once daily., Disp: 90 tablet, Rfl: 1    BD BOO 2ND GEN PEN NEEDLE 32 gauge x 5/32" Ndle, To injection daily, Disp: 100 each, Rfl: 8    blood sugar diagnostic Strp, 1 strip by Misc.(Non-Drug; Combo Route) route once daily., Disp: 100 strip, Rfl: 3    blood-glucose meter,continuous (DEXCOM G7 ) Misc, To use with sensor, Disp: 1 each, Rfl: 0    blood-glucose sensor (DEXCOM G7 SENSOR) Areli, To change every 10 days, Disp: 3 each, Rfl: 3    busPIRone (BUSPAR) 5 MG Tab, Take 1 tablet (5 mg total) by mouth 2 (two) times daily. For Anxiety., Disp: 60 tablet, Rfl: 2    carvediloL (COREG) 25 MG tablet, Take 1 tablet (25 mg total) by mouth 2 (two) times daily., Disp: 180 tablet, Rfl: 2    clopidogreL (PLAVIX) 75 mg tablet, Take 1 tablet (75 mg " total) by mouth once daily., Disp: 90 tablet, Rfl: 3    diphenoxylate-atropine 2.5-0.025 mg (LOMOTIL) 2.5-0.025 mg per tablet, TAKE 1 TABLET BY MOUTH FOUR TIMES DAILY AS NEEDED FOR DIARRHEA, Disp: 30 tablet, Rfl: 0    dulaglutide (TRULICITY) 4.5 mg/0.5 mL pen injector, Inject 4.5 mg into the skin every 7 days., Disp: 4 pen , Rfl: 37    FEROSUL 325 mg (65 mg iron) Tab tablet, TAKE 1 TABLET BY MOUTH DAILY, Disp: 90 tablet, Rfl: 1    furosemide (LASIX) 20 MG tablet, TAKE 1 TABLET BY MOUTH ON SATURDAY, SUNDAY, MONDAY, WEDNESDAY, FRIDAY, Disp: 90 tablet, Rfl: 3    gabapentin (NEURONTIN) 100 MG capsule, Take 1-2 capsules (100-200 mg total) by mouth every evening., Disp: 90 capsule, Rfl: 2    glucagon (GVOKE HYPOPEN 2-PACK) 1 mg/0.2 mL AtIn, Inject 1 Package into the skin as needed., Disp: 2 Syringe, Rfl: 0    irbesartan (AVAPRO) 300 MG tablet, Take 1 tablet (300 mg total) by mouth every evening., Disp: 90 tablet, Rfl: 2    isosorbide mononitrate (IMDUR) 30 MG 24 hr tablet, Take 2 tablets (60 mg total) by mouth once daily., Disp: 180 tablet, Rfl: 3    LIDOcaine (LIDODERM) 5 %, Place 1 patch onto the skin once daily. Remove & Discard patch within 12 hours or as directed by MD, Disp: 14 patch, Rfl: 0    multivitamin (THERAGRAN) per tablet, Take 1 tablet by mouth once daily., Disp: , Rfl:     nitroGLYCERIN (NITROSTAT) 0.4 MG SL tablet, Place 1 tablet (0.4 mg total) under the tongue every 5 (five) minutes as needed for Chest pain., Disp: 100 tablet, Rfl: 1    vitamin D (VITAMIN D3) 1000 units Tab, Take 1,000 Units by mouth twice a week. Monday AND THURSDAYS ONLY, Disp: , Rfl:     aspirin (ECOTRIN) 81 MG EC tablet, Take 1 tablet (81 mg total) by mouth 2 (two) times a day. (Patient taking differently: Take 81 mg by mouth once daily.), Disp: 60 tablet, Rfl: 0    dexAMETHasone (DECADRON) 1 MG Tab, Take 1 tablet (1 mg total) by mouth once. Take 1 tablet at 11 pm the night before 8 am lab work for 1 dose, Disp: 1 tablet, Rfl: 0     glipiZIDE (GLUCOTROL) 5 MG tablet, Take 2 tablets (10 mg total) by mouth 2 (two) times daily with meals., Disp: 360 tablet, Rfl: 3  No current facility-administered medications for this visit.    Facility-Administered Medications Ordered in Other Visits:     0.9%  NaCl infusion, , Intravenous, Continuous, Doug De Anda MD    Labs reviewed    Assessment and plan:   1. Type 2 diabetes mellitus with stage 3a chronic kidney disease, without long-term current use of insulin  Comprehensive Metabolic Panel    TSH    Hemoglobin A1C    Cortisol, 8AM    dexAMETHasone (DECADRON) 1 MG Tab    Dexamethasone    Renal Function Panel      2. Osteoporosis without current pathological fracture, unspecified osteoporosis type  DEXA Bone Density Axial Skeleton 1 or more Site W TBS XPD      3. Class 3 severe obesity due to excess calories with serious comorbidity and body mass index (BMI) of 40.0 to 44.9 in adult        4. Vitamin D deficiency, unspecified        5. Essential hypertension        6. Hyperlipidemia, unspecified hyperlipidemia type          Type 2 diabetes mellitus with stage 3 chronic kidney disease, without long-term current use of insulin  -- Labs prior  -- A1c 7-7.5% without hypoglycemia.     A1c improved and at goal  Dexcom: average blood sugar 150; <1% VERY LOW; 0% low; 82% in range; 18% high; 0% very high  Blood sugars are at goal      She is having hypoglycemia unawareness. I recommend dexcom for high and low blood sugar alerts. She also had hypoglycemia on labs BG 66 and did was not aware.      Patient has diabetes mellitus and manages diabetes with an intensive insulin regimen. The patient requires a therapeutic CGM and is willing to use therapeutic CGM for the necesary frequent adjustments of insulin therapy. Patient has been using SMBG for frequent glucose monitoring (4+ times daily). I have completed an in-person visit during the previous 6 months and will continue to have in-person visits every 6 months to  assess adherence to their CGM regimen and diabetes treatment plan.     Medication Changes   Continue Trulicity 4.5 mg weekly/Ozempic 2 mg weekly -whichever you can find   Continue glipizide 10 mg with twice daily with meals BUT decrease to glipizide 5 mg with small meal      Consider switching to glimepiride in the future     -- We will continue novolog for steroid injections. Discussed she will take the novolog before meals with sliding scale below.  With using correction scale:  MDD of:   150-200: +2 units  201-250: +4 units  251-300: +6 units  301-350: +8 units  >350: +10 units    -- Has GVOKE pen.   -- She is fearful of DKA Discussed signs and symptoms of DKA and instructed to buy ketone strips  -- Reviewed goals of therapy are to get the best control we can without hypoglycemia  -- Insulin injection sites and proper rotation instructed.    -- Advised frequent self blood glucose monitoring.  Patient encouraged to document glucose results and bring them to every clinic visit.  -- Hypoglycemia precautions discussed. Instructed on precautions before driving.    -- Call for Bg repeatedly < 90 or > 180.   -- Close adherence to lifestyle changes recommended.   -- Periodic follow ups for eye evaluations, foot care and dental care suggested. UTD    Osteoporosis without current pathological fracture  -- Current meds: Fosamax  Started: 6/2019  Stopped 9/2024  -- Reviewed basics of quantity versus quality  -- Reassuring she is not fracturing   -- RDA of calcium (2400-3038 mg /day in divided doses) and vitamin D 2000iu a day  discussed  -- Calcium from food sources preferred  -- Fall precautions emphasized  -- +weight bearing exercise    -- Repeat DEXA scan in June 2025      Class 3 severe obesity due to excess calories with serious comorbidity and body mass index (BMI) of 40.0 to 44.9 in adult  Encouraged continued exercise and diet.  Given information on diet  Continue trulicity/ozempic that will aid in weight  loss.  Check DST    Vitamin D deficiency, unspecified  Normal  Continue vit d 1000 twice weekly    Essential hypertension  -- on ARB  -- Controlled  -- Blood pressure goals discussed with patient    Hyperlipidemia  LDL at goal on max dose statin    Visit today included increased complexity associated with the care of episodic problems Type 2 diabetes, hypertension, obesity, vitamin D deficiency addressed and managing longitudinal care of the patient due to serious managed problems Type 2 diabetes, hypertension, obesity, vitamin D deficiency    Follow up in about 4 months (around 6/4/2025).  Labs prior to RTC

## 2025-02-04 ENCOUNTER — OFFICE VISIT (OUTPATIENT)
Dept: ENDOCRINOLOGY | Facility: CLINIC | Age: 80
End: 2025-02-04
Payer: MEDICARE

## 2025-02-04 VITALS
WEIGHT: 232.38 LBS | HEART RATE: 107 BPM | SYSTOLIC BLOOD PRESSURE: 110 MMHG | OXYGEN SATURATION: 97 % | BODY MASS INDEX: 42.76 KG/M2 | DIASTOLIC BLOOD PRESSURE: 74 MMHG | HEIGHT: 62 IN

## 2025-02-04 DIAGNOSIS — E66.813 CLASS 3 SEVERE OBESITY DUE TO EXCESS CALORIES WITH SERIOUS COMORBIDITY AND BODY MASS INDEX (BMI) OF 40.0 TO 44.9 IN ADULT: ICD-10-CM

## 2025-02-04 DIAGNOSIS — N18.31 TYPE 2 DIABETES MELLITUS WITH STAGE 3A CHRONIC KIDNEY DISEASE, WITHOUT LONG-TERM CURRENT USE OF INSULIN: Primary | Chronic | ICD-10-CM

## 2025-02-04 DIAGNOSIS — M81.0 OSTEOPOROSIS WITHOUT CURRENT PATHOLOGICAL FRACTURE, UNSPECIFIED OSTEOPOROSIS TYPE: Chronic | ICD-10-CM

## 2025-02-04 DIAGNOSIS — I10 ESSENTIAL HYPERTENSION: Chronic | ICD-10-CM

## 2025-02-04 DIAGNOSIS — E66.01 CLASS 3 SEVERE OBESITY DUE TO EXCESS CALORIES WITH SERIOUS COMORBIDITY AND BODY MASS INDEX (BMI) OF 40.0 TO 44.9 IN ADULT: ICD-10-CM

## 2025-02-04 DIAGNOSIS — E78.5 HYPERLIPIDEMIA, UNSPECIFIED HYPERLIPIDEMIA TYPE: ICD-10-CM

## 2025-02-04 DIAGNOSIS — E11.22 TYPE 2 DIABETES MELLITUS WITH STAGE 3A CHRONIC KIDNEY DISEASE, WITHOUT LONG-TERM CURRENT USE OF INSULIN: Primary | Chronic | ICD-10-CM

## 2025-02-04 DIAGNOSIS — E55.9 VITAMIN D DEFICIENCY, UNSPECIFIED: ICD-10-CM

## 2025-02-04 PROCEDURE — 1160F RVW MEDS BY RX/DR IN RCRD: CPT | Mod: CPTII,S$GLB,, | Performed by: NURSE PRACTITIONER

## 2025-02-04 PROCEDURE — 3078F DIAST BP <80 MM HG: CPT | Mod: CPTII,S$GLB,, | Performed by: NURSE PRACTITIONER

## 2025-02-04 PROCEDURE — 3072F LOW RISK FOR RETINOPATHY: CPT | Mod: CPTII,S$GLB,, | Performed by: NURSE PRACTITIONER

## 2025-02-04 PROCEDURE — 3074F SYST BP LT 130 MM HG: CPT | Mod: CPTII,S$GLB,, | Performed by: NURSE PRACTITIONER

## 2025-02-04 PROCEDURE — 99999 PR PBB SHADOW E&M-EST. PATIENT-LVL V: CPT | Mod: PBBFAC,,, | Performed by: NURSE PRACTITIONER

## 2025-02-04 PROCEDURE — 1126F AMNT PAIN NOTED NONE PRSNT: CPT | Mod: CPTII,S$GLB,, | Performed by: NURSE PRACTITIONER

## 2025-02-04 PROCEDURE — 3288F FALL RISK ASSESSMENT DOCD: CPT | Mod: CPTII,S$GLB,, | Performed by: NURSE PRACTITIONER

## 2025-02-04 PROCEDURE — 95251 CONT GLUC MNTR ANALYSIS I&R: CPT | Mod: S$GLB,,, | Performed by: NURSE PRACTITIONER

## 2025-02-04 PROCEDURE — 99214 OFFICE O/P EST MOD 30 MIN: CPT | Mod: S$GLB,,, | Performed by: NURSE PRACTITIONER

## 2025-02-04 PROCEDURE — 1101F PT FALLS ASSESS-DOCD LE1/YR: CPT | Mod: CPTII,S$GLB,, | Performed by: NURSE PRACTITIONER

## 2025-02-04 PROCEDURE — G2211 COMPLEX E/M VISIT ADD ON: HCPCS | Mod: S$GLB,,, | Performed by: NURSE PRACTITIONER

## 2025-02-04 PROCEDURE — 1159F MED LIST DOCD IN RCRD: CPT | Mod: CPTII,S$GLB,, | Performed by: NURSE PRACTITIONER

## 2025-02-04 RX ORDER — DEXAMETHASONE 1 MG/1
1 TABLET ORAL ONCE
Qty: 1 TABLET | Refills: 0 | Status: SHIPPED | OUTPATIENT
Start: 2025-02-04 | End: 2025-02-04

## 2025-02-04 NOTE — ASSESSMENT & PLAN NOTE
-- Current meds: Fosamax  Started: 6/2019  Stopped 9/2024  -- Reviewed basics of quantity versus quality  -- Reassuring she is not fracturing   -- RDA of calcium (4457-0617 mg /day in divided doses) and vitamin D 2000iu a day  discussed  -- Calcium from food sources preferred  -- Fall precautions emphasized  -- +weight bearing exercise    -- Repeat DEXA scan in June 2025

## 2025-02-04 NOTE — PATIENT INSTRUCTIONS
[employer]: G7 Adhesive Patches Waterproof 20Pack G7 Overpatch Flexible CGM Tape Non-Glue Central Full Cover Hypoallergenic & Latex-Free Stelo Patch Overlay for 10 Days Long Stay, Multi-Colored : [facility] Household     Diabetes  A1c improved and at goal  Dexcom: average blood sugar 150; <1% VERY LOW; 0% low; 82% in range; 18% high; 0% very high  Blood sugars are at goal      She is having hypoglycemia unawareness. I recommend dexcom for high and low blood sugar alerts. She also had hypoglycemia on labs BG 66 and did was not aware.      Patient has diabetes mellitus and manages diabetes with an intensive insulin regimen. The patient requires a therapeutic CGM and is willing to use therapeutic CGM for the necesary frequent adjustments of insulin therapy. Patient has been using SMBG for frequent glucose monitoring (4+ times daily). I have completed an in-person visit during the previous 6 months and will continue to have in-person visits every 6 months to assess adherence to their CGM regimen and diabetes treatment plan.     Medication Changes   Continue Trulicity 4.5 mg weekly/Ozempic 2 mg weekly -whichever you can find   Continue glipizide 10 mg with twice daily with meals BUT decrease to glipizide 5 mg with small meal      Consider switching to glimepiride in the future     Osteoporosis               Continue Vitamin D 2000 IU twice weekly               RDA of calcium is 1468-4292 mg daily, preferable from food               Avoid alcohol and tobacco and falls              Check bone density in June 2025   Off fosamax since Oct 2024    Here are the instructions for the dexamethasone suppression test.    Please take 1 mg dexamethasone between 11 PM-12 AM. Then have your blood drawn at 8-9 AM the next morning.    I have sent the prescription of dexamethasone to your pharmacy and entered the blood tests for you.     
Yes

## 2025-02-04 NOTE — ASSESSMENT & PLAN NOTE
-- Labs prior  -- A1c 7-7.5% without hypoglycemia.     A1c improved and at goal  Dexcom: average blood sugar 150; <1% VERY LOW; 0% low; 82% in range; 18% high; 0% very high  Blood sugars are at goal      She is having hypoglycemia unawareness. I recommend dexcom for high and low blood sugar alerts. She also had hypoglycemia on labs BG 66 and did was not aware.      Patient has diabetes mellitus and manages diabetes with an intensive insulin regimen. The patient requires a therapeutic CGM and is willing to use therapeutic CGM for the necesary frequent adjustments of insulin therapy. Patient has been using SMBG for frequent glucose monitoring (4+ times daily). I have completed an in-person visit during the previous 6 months and will continue to have in-person visits every 6 months to assess adherence to their CGM regimen and diabetes treatment plan.     Medication Changes   Continue Trulicity 4.5 mg weekly/Ozempic 2 mg weekly -whichever you can find   Continue glipizide 10 mg with twice daily with meals BUT decrease to glipizide 5 mg with small meal      Consider switching to glimepiride in the future     -- We will continue novolog for steroid injections. Discussed she will take the novolog before meals with sliding scale below.  With using correction scale:  MDD of:   150-200: +2 units  201-250: +4 units  251-300: +6 units  301-350: +8 units  >350: +10 units    -- Has GVOKE pen.   -- She is fearful of DKA Discussed signs and symptoms of DKA and instructed to buy ketone strips  -- Reviewed goals of therapy are to get the best control we can without hypoglycemia  -- Insulin injection sites and proper rotation instructed.    -- Advised frequent self blood glucose monitoring.  Patient encouraged to document glucose results and bring them to every clinic visit.  -- Hypoglycemia precautions discussed. Instructed on precautions before driving.    -- Call for Bg repeatedly < 90 or > 180.   -- Close adherence to lifestyle  changes recommended.   -- Periodic follow ups for eye evaluations, foot care and dental care suggested. UTD

## 2025-02-04 NOTE — ASSESSMENT & PLAN NOTE
Encouraged continued exercise and diet.  Given information on diet  Continue trulicity/ozempic that will aid in weight loss.  Check DST

## 2025-02-12 ENCOUNTER — PATIENT MESSAGE (OUTPATIENT)
Dept: ENDOCRINOLOGY | Facility: CLINIC | Age: 80
End: 2025-02-12
Payer: MEDICARE

## 2025-02-13 ENCOUNTER — OFFICE VISIT (OUTPATIENT)
Dept: OPHTHALMOLOGY | Facility: CLINIC | Age: 80
End: 2025-02-13
Payer: MEDICARE

## 2025-02-13 DIAGNOSIS — H25.012 CORTICAL SENILE CATARACT OF LEFT EYE: ICD-10-CM

## 2025-02-13 DIAGNOSIS — E11.9 TYPE 2 DIABETES MELLITUS WITHOUT OPHTHALMIC MANIFESTATIONS: ICD-10-CM

## 2025-02-13 DIAGNOSIS — H57.89 ABNORMAL RED REFLEX OF EYE: ICD-10-CM

## 2025-02-13 DIAGNOSIS — G47.33 OSA ON CPAP: ICD-10-CM

## 2025-02-13 DIAGNOSIS — H25.12 NUCLEAR SCLEROTIC CATARACT OF LEFT EYE: Primary | ICD-10-CM

## 2025-02-13 DIAGNOSIS — Z96.1 PSEUDOPHAKIA, RIGHT EYE: ICD-10-CM

## 2025-02-13 DIAGNOSIS — H43.813 PVD (POSTERIOR VITREOUS DETACHMENT), BOTH EYES: ICD-10-CM

## 2025-02-13 DIAGNOSIS — H52.31 ANISOMETROPIA: ICD-10-CM

## 2025-02-13 PROCEDURE — 99999 PR PBB SHADOW E&M-EST. PATIENT-LVL III: CPT | Mod: PBBFAC,,, | Performed by: OPHTHALMOLOGY

## 2025-02-13 PROCEDURE — 99499 UNLISTED E&M SERVICE: CPT | Mod: S$GLB,,, | Performed by: OPHTHALMOLOGY

## 2025-02-15 RX ORDER — MOXIFLOXACIN 5 MG/ML
1 SOLUTION/ DROPS OPHTHALMIC
OUTPATIENT
Start: 2025-02-15

## 2025-02-15 RX ORDER — PROPARACAINE HYDROCHLORIDE 5 MG/ML
1 SOLUTION/ DROPS OPHTHALMIC
OUTPATIENT
Start: 2025-02-15

## 2025-02-15 RX ORDER — PHENYLEPHRINE HYDROCHLORIDE 100 MG/ML
1 SOLUTION/ DROPS OPHTHALMIC
OUTPATIENT
Start: 2025-02-15

## 2025-02-15 RX ORDER — SODIUM CHLORIDE 0.9 % (FLUSH) 0.9 %
10 SYRINGE (ML) INJECTION
Status: SHIPPED | OUTPATIENT
Start: 2025-02-15

## 2025-02-15 RX ORDER — TROPICAMIDE 10 MG/ML
1 SOLUTION/ DROPS OPHTHALMIC
OUTPATIENT
Start: 2025-02-15

## 2025-02-15 RX ORDER — KETOROLAC TROMETHAMINE 5 MG/ML
1 SOLUTION OPHTHALMIC
OUTPATIENT
Start: 2025-02-15

## 2025-02-15 NOTE — H&P
Subjective     Patient ID: Kaylee Romero is a 80 y.o. female.    Chief Complaint: Pre-op Exam    HPI  Review of Systems   Constitutional: Negative.    HENT: Negative.     Eyes:  Positive for visual disturbance.   Respiratory: Negative.     Cardiovascular: Negative.    Neurological: Negative.    Psychiatric/Behavioral: Negative.            Objective     Physical Exam  Constitutional:       Appearance: She is well-developed.   HENT:      Head: Normocephalic.   Eyes:      Comments: See eye exam   Cardiovascular:      Rate and Rhythm: Normal rate and regular rhythm.   Pulmonary:      Effort: Pulmonary effort is normal.   Neurological:      Mental Status: She is oriented to person, place, and time.            Assessment and Plan     1. Nuclear sclerotic cataract of left eye  -     IOL Master - OS - Left Eye    2. Cortical senile cataract of left eye  -     IOL Master - OS - Left Eye    3. Abnormal red reflex of eye    4. Type 2 diabetes mellitus without ophthalmic manifestations    5. PVD (posterior vitreous detachment), both eyes    6. Pseudophakia, right eye    7. KAMRAN on CPAP    8. Anisometropia        Phaco/IOL os - mixed catarat - poor red reflex          No follow-ups on file.

## 2025-02-15 NOTE — PROGRESS NOTES
HPI    Preop Phaco IOL OS (sx 2/26/25)  -S/p Phaco IOL OD 11/6/24    1. NS OS  2. PVD OU  3. Retinal Drusen OU  4. Type 2 DM no DR  5. KAMRAN (on CPAP)  6. Essential HTN  7. Presbyopia  8. PCIOL OD      Last edited by Dayana Delgado MA on 2/13/2025  1:18 PM.            Assessment /Plan     For exam results, see Encounter Report.    Nuclear sclerotic cataract of left eye  -     IOL Master - OS - Left Eye    Cortical senile cataract of left eye  -     IOL Master - OS - Left Eye    Abnormal red reflex of eye    Type 2 diabetes mellitus without ophthalmic manifestations    PVD (posterior vitreous detachment), both eyes    Pseudophakia, right eye    KAMRAN on CPAP    Anisometropia        This patient is the wife of a long standing glaucoma patient of mine   Want to continue with me for yearly checks       PVD (posterior vitreous detachment), bilateral  Retinal drusen of both eyes  Arcus senilis of both corneas  Type 2 diabetes mellitus without ophthalmic manifestations  Essential hypertension              No retinopathy, monitor yearly   // OD done 11/6/2024             visually significant at this time OD>OS     Consider cat sx OD first     KAMRAN on CPAP     Presbyopia              Rx specs    Pt C/O blurry vision and glare - OD>OS   Has been getting worse over the last year   Visually sign OU -     Discussed options and pt would like to have phaco/IOL od first -   Pt is hyperopic with slight stigmatism ou   Discussed IOL options   Pt declines a mutifocal or toric IOL   Prefers a monofocal IOL - set for distance plano to -0.5     Pt is very anxious and does not want to be aware of anything that is going on. She is requesting general anesthesia if possible. She is very squeamish about her eyes and is afraid she will not be able to be still.     HOLD TRULICITY FOR > 1 WEEK BEFORE SURGERY - pt is aware that if she does not hold this that the surgery will have to be canceled and re-scheduled - because of a increase in anesthetic  risk .     IOL calcs   OD  DIB00 24.0  MTA4U0 20.0    Pt is referring 2 patients to me   Beena Nicole - h/o poor result post CE elsewhere   ? Harriet Moraes ( I use to see this patients mother Ms Eugenia treviño - who has passed)     Phaco / IOL OD Date: 11/6/2024 - general anesthesia -   POM 3    - phaco/IOL   Off gtts       Pt feels off balance - decrease depth perception   Would like CE - 2nd eye - OS   Wants sx feb 26th     F/U pre-op phaco / IOL os - wants GENERAL ANESTHESIA AGAIN - very anxious     IOL calcs OS - 2nd eye   DIB00 23.5  MTA4U0 20.0    Risks and benefits discussed and consent signed.  Pt again wants general (like for her first eye )   Again pt is to hold trulicity > 1 week ahead   F/U 1 day post op

## 2025-02-18 DIAGNOSIS — E11.22 TYPE 2 DIABETES MELLITUS WITH STAGE 3A CHRONIC KIDNEY DISEASE, WITHOUT LONG-TERM CURRENT USE OF INSULIN: Primary | ICD-10-CM

## 2025-02-18 DIAGNOSIS — M81.0 OSTEOPOROSIS WITHOUT CURRENT PATHOLOGICAL FRACTURE, UNSPECIFIED OSTEOPOROSIS TYPE: ICD-10-CM

## 2025-02-18 DIAGNOSIS — N18.31 TYPE 2 DIABETES MELLITUS WITH STAGE 3A CHRONIC KIDNEY DISEASE, WITHOUT LONG-TERM CURRENT USE OF INSULIN: Primary | ICD-10-CM

## 2025-02-18 RX ORDER — DEXAMETHASONE 1 MG/1
1 TABLET ORAL ONCE
Qty: 1 TABLET | Refills: 0 | Status: SHIPPED | OUTPATIENT
Start: 2025-02-18 | End: 2025-02-18

## 2025-02-19 ENCOUNTER — LAB VISIT (OUTPATIENT)
Dept: LAB | Facility: HOSPITAL | Age: 80
End: 2025-02-19
Attending: NURSE PRACTITIONER
Payer: MEDICARE

## 2025-02-19 DIAGNOSIS — E11.22 TYPE 2 DIABETES MELLITUS WITH STAGE 3A CHRONIC KIDNEY DISEASE, WITHOUT LONG-TERM CURRENT USE OF INSULIN: ICD-10-CM

## 2025-02-19 DIAGNOSIS — M81.0 OSTEOPOROSIS WITHOUT CURRENT PATHOLOGICAL FRACTURE, UNSPECIFIED OSTEOPOROSIS TYPE: ICD-10-CM

## 2025-02-19 DIAGNOSIS — N18.31 TYPE 2 DIABETES MELLITUS WITH STAGE 3A CHRONIC KIDNEY DISEASE, WITHOUT LONG-TERM CURRENT USE OF INSULIN: ICD-10-CM

## 2025-02-19 LAB — CORTIS SERPL-MCNC: 1.1 UG/DL (ref 4.3–22.4)

## 2025-02-19 PROCEDURE — 36415 COLL VENOUS BLD VENIPUNCTURE: CPT | Mod: PN | Performed by: NURSE PRACTITIONER

## 2025-02-19 PROCEDURE — 82542 COL CHROMOTOGRAPHY QUAL/QUAN: CPT | Performed by: NURSE PRACTITIONER

## 2025-02-19 PROCEDURE — 82533 TOTAL CORTISOL: CPT | Performed by: NURSE PRACTITIONER

## 2025-02-24 ENCOUNTER — TELEPHONE (OUTPATIENT)
Dept: OPHTHALMOLOGY | Facility: CLINIC | Age: 80
End: 2025-02-24
Payer: MEDICARE

## 2025-02-24 NOTE — PRE-PROCEDURE INSTRUCTIONS
PreOp Instructions given:     -- Medication information (what to hold and what to take)   -- NPO guidelines as follows: (or as per your Surgeon)  Stop ALL solid food, gum, candy 8 hours before arrival time.  Stop all CLOUDY liquids: coffee with creamer, cloudy juices, 8 hours prior to arrival time.  The patient should be ENCOURAGED to drink carbohydrate-rich clear liquids (sports drinks, clear juices) until 2 hours prior to arrival time.  Stop clear liquids 2 hours prior to arrival time.  CLEAR liquids include water, black coffee NO creamer, clear oral rehydration drinks, clear sports drinks and clear fruit juices (no orange juice, no pulpy juices, no apple cider).   IF IN DOUBT, drink water instead.   NOTHING TO DRINK 2 hours before to surgery/procedure  time. If you are told to take medication on the morning of surgery, it may be taken with a sip of water.   -- *Arrival place and directions given*.  Time to be given the day before procedure by the Surgeon's Office   -- Bathe with antibacterial soap (dial or Hibiclens as instructed)  -- Don't wear any jewelry or valuables and not metals on skin or hair AM of surgery   -- No makeup or moisturizer to face   -- No perfume/cologne, powder, lotions, aftershave or deodorant     Pt verbalized understanding.            *If going to , see below:      Directions and Instructions for [facility] [place]   At [facility], we have an outstanding team of physicians, anesthesiologists, CRNAs, Registered Nurses, Surgical Technologists, and other ancillary team members all focused on your surgical and procedural care.   Before Your Procedure:   The physician's office will call you with a specific arrival time and directions a day or two before your scheduled procedure. You may also receive these instructions through your [facility] portal.   Day of Procedure:   Please be sure to arrive at the arrival time given or you may risk your surgery being delayed  or canceled. The arrival time is earlier than your scheduled surgery or procedure time. In the winter months please dress warm and bring blankets for you or your child as the waiting room may be cold. If you have difficulty locating the facility, please give us a call at [phone].   Directions:   The [facility] is located on the 1st floor of the hospital building near the [place] entrance.   Parking:   You will park in the [facility]age (note location on map). [facility] opens at 5:00 a.m. and has a drop off area by the entrance.  parking is available starting at 7:00 a.m. Please see below for further  parking instructions.   Directions from the parking garage elevators   Blue [facility] Elevators: From the parking garage, take the blue [employer] elevators (located in the center of the parking garage) to the 1st floor of the garage. You will then take a right once off the elevators then another right to the outside of the parking garage. You will be across from the [facility]. You will walk down the sidewalk, pass the  curve at the [place] entrance and continue to follow the sidewalk. You will pass the radiation oncology entrance on your right. Continue to follow the sidewalk to the [facility] glass door entrance.   Hospital Entrance (Inside Route): If a mostly inside route is preferred: Take the inside elevator bank (located at the far north end of the garage) from the parking garage to the 1st floor. On the 1st floor walk past [employer]. Keep walking down the center of the hallway towards the hospital elevators. Once you reach the red brick marshall, take a left and go past the hospital elevators. Take another left and follow the blue and white [place] signs around the hallway to the end. Go outside of the door. You will see the [facility] entrance to your right.   Drop Off:   There is a drop off area  at the doors of the [facility] for your convenience. If utilized for pediatric patients, an adult must accompany the patient into the surgery center while another adult king the vehicle.   [place] (at 7:00 a.m.):   Upon check-in, please let the  know that you are utilizing [employer] parking which is free. The . will then call  for your car to be picked up. Your keys and phone number will be collected and given to [employer] services. You will then be given a ticket. Upon discharge,  will be notified to bring your vehicle back when you are ready.           Directions to [facility] [place]:  [phone]     From 1st floor garage elevators: go past [facility] [employer] shop. Look for Black piano on side of coffee shop. Take Atrium (gold) elevator by piano up to 2nd floor. When you exit elevator follow long hallway (you should see a sign hanging from the ceiling that says Day of Surgery Family Waiting Room. When hallway ends you will be entering the day of surgery waiting area. Check in at the desk for your procedure.      From [facility] entrance: Make a right after you enter the door to the hospital. On your Left, take [place] elevator to 2nd floor. Check in at desk      From Lab desk on 2nd floor: Exit lab area toward hospital atrium. You should see a sign that says Day of Surgery Family Waiting Area. Make a Left and follow long hallway (you should see a sign hanging from the ceiling that says Day of Surgery Family Waiting Room. When hallway ends you will be entering the day of surgery waiting area. Check in at the desk for your procedure.

## 2025-02-25 ENCOUNTER — ANESTHESIA EVENT (OUTPATIENT)
Dept: SURGERY | Facility: HOSPITAL | Age: 80
End: 2025-02-25
Payer: MEDICARE

## 2025-02-25 NOTE — ANESTHESIA PREPROCEDURE EVALUATION
[facility]  Anesthesia Pre-Operative Evaluation         Patient Name/: Kaylee Romero, 1945  MRN: [id number]    SUBJECTIVE:          2025    Kaylee Romero is a 80 y.o. female     Patient now presents for the above procedure(s).    ________________________________________  Results for orders placed during the hospital encounter of 22    Echo    Interpretation Summary  · The left ventricle is normal in size with concentric remodeling and normal systolic function.  · The estimated ejection fraction is 68%.  · Indeterminate left ventricular diastolic function.  · Normal right ventricular size with normal right ventricular systolic function.  · There is mild aortic valve stenosis.  · Aortic valve area is 2.18 cm2; peak velocity is 2.04 m/s; mean gradient is 11 mmHg.  · Intermediate central venous pressure (8 mmHg).  · The estimated PA systolic pressure is 27 mmHg.    ________________________________________    LDA:        Drips:       Patient Active Problem List   Diagnosis    Essential hypertension    Class 3 severe obesity due to excess calories with serious comorbidity and body mass index (BMI) of 40.0 to 44.9 in adult    KAMRAN on CPAP    Chronic kidney disease, stage III (moderate)    History of total knee arthroplasty, left    Osteoporosis without current pathological fracture    History of TIA (transient ischemic attack)    Age-related osteoporosis without current pathological fracture    Vitamin D deficiency, unspecified    Type 2 diabetes mellitus with stage 3 chronic kidney disease, without long-term current use of insulin    Coronary 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 pectoris    S/P CABG (coronary artery bypass graft)    Shoulder pain    Chronic heart failure with preserved ejection fraction    Decreased range of motion of right knee    Gait, antalgic    Purpura    Degenerative cervical spinal stenosis    Cervical myelopathy    DDD  (degenerative disc disease), cervical    Status post total knee replacement, right    Hyperlipidemia    Nonrheumatic aortic valve stenosis    Nuclear sclerotic cataract of right eye       Review of patient's allergies indicates:   Allergen Reactions    Bactrim [sulfamethoxazole-trimethoprim]      Generic version  Sulfa makes her sick    Keflex [cephalexin]      Turns orange       Current Inpatient Medications:         Current Facility-Administered Medications on File Prior to Encounter   Medication Dose Route Frequency Provider Last Rate Last Admin    0.9%  NaCl infusion   Intravenous Continuous Doug De Anda MD         Current Outpatient Medications on File Prior to Encounter   Medication Sig Dispense Refill    acetaminophen (TYLENOL) 500 MG tablet Take 500 mg by mouth 2 (two) times daily as needed for Pain.      amLODIPine (NORVASC) 10 MG tablet TAKE 1 TABLET(10 MG) BY MOUTH EVERY DAY 90 tablet 1    atorvastatin (LIPITOR) 80 MG tablet Take 1 tablet (80 mg total) by mouth once daily. 90 tablet 1    diphenoxylate-atropine 2.5-0.025 mg (LOMOTIL) 2.5-0.025 mg per tablet TAKE 1 TABLET BY MOUTH FOUR TIMES DAILY AS NEEDED FOR DIARRHEA 30 tablet 0    FEROSUL 325 mg (65 mg iron) Tab tablet TAKE 1 TABLET BY MOUTH DAILY 90 tablet 1    furosemide (LASIX) 20 MG tablet TAKE 1 TABLET BY MOUTH ON SATURDAY, SUNDAY, MONDAY, WEDNESDAY, FRIDAY 90 tablet 3    gabapentin (NEURONTIN) 100 MG capsule Take 1-2 capsules (100-200 mg total) by mouth every evening. 90 capsule 2    glipiZIDE (GLUCOTROL) 5 MG tablet Take 2 tablets (10 mg total) by mouth 2 (two) times daily with meals. 360 tablet 3    isosorbide mononitrate (IMDUR) 30 MG 24 hr tablet Take 2 tablets (60 mg total) by mouth once daily. 180 tablet 3    multivitamin (THERAGRAN) per tablet Take 1 tablet by mouth once daily.      vitamin D (VITAMIN D3) 1000 units Tab Take 1,000 Units by mouth twice a week. Monday AND THURSDAYS ONLY      aspirin (ECOTRIN) 81 MG EC tablet Take 1 tablet  "(81 mg total) by mouth 2 (two) times a day. (Patient taking differently: Take 81 mg by mouth once daily.) 60 tablet 0    BD BOO 2ND GEN PEN NEEDLE 32 gauge x 32" Ndle To injection daily 100 each 8    blood sugar diagnostic Strp 1 strip by Misc.(Non-Drug; Combo Route) route once daily. 100 strip 3    blood-glucose meter,continuous (DEXCOM G7 ) Misc To use with sensor 1 each 0    blood-glucose sensor (DEXCOM G7 SENSOR) Areli To change every 10 days 3 each 3    clopidogreL (PLAVIX) 75 mg tablet Take 1 tablet (75 mg total) by mouth once daily. 90 tablet 3    dulaglutide (TRULICITY) 4.5 mg/0.5 mL pen injector Inject 4.5 mg into the skin every 7 days. 4 pen 37    glucagon (GVOKE HYPOPEN 2-PACK) 1 mg/0.2 mL AtIn Inject 1 Package into the skin as needed. 2 Syringe 0    LIDOcaine (LIDODERM) 5 % Place 1 patch onto the skin once daily. Remove & Discard patch within 12 hours or as directed by MD (Patient not taking: Reported on 2025) 14 patch 0    nitroGLYCERIN (NITROSTAT) 0.4 MG SL tablet Place 1 tablet (0.4 mg total) under the tongue every 5 (five) minutes as needed for Chest pain. 100 tablet 1       Past Surgical History:   Procedure Laterality Date    ankle surgery (l) Left     APPENDECTOMY      CATARACT EXTRACTION W/  INTRAOCULAR LENS IMPLANT Right 2024         SECTION      CORONARY ARTERY BYPASS GRAFT  09/2019    x 2    CORONARY ARTERY BYPASS GRAFT (CABG) N/A 2019    Procedure: CORONARY ARTERY BYPASS GRAFT (CABG)X3;  Surgeon: Peterson Ventura MD;  Location: [facility] OR [address] [place] [address];  Service: Cardiovascular;  Laterality: N/A;    CORONARY BYPASS GRAFT ANGIOGRAPHY  10/18/2021    Procedure: Bypass graft study;  Surgeon: Jamar Haddad MD;  Location: [facility] CATH LAB;  Service: Cardiology;;    DILATION AND CURETTAGE OF UTERUS      ENDOSCOPIC HARVEST OF VEIN Left 2019    Procedure: SURGICAL PROCUREMENT, VEIN, ENDOSCOPIC;  Surgeon: Peterson Ventura MD;  Location: [facility] OR 2ND " FLR;  Service: Cardiovascular;  Laterality: Left;  Vein [place] Start: 0843; End: 1054   Vein Prep Start: 1055; End: 1145    INTRAOCULAR PROSTHESES INSERTION Right 11/06/2024    Procedure: INSERTION, IOL PROSTHESIS;  Surgeon: Sylvia Dela Cruz MD;  Location: [facility] OR 1ST FLR;  Service: Ophthalmology;  Laterality: Right;    JOINT REPLACEMENT Left     knee replacement    KNEE ARTHROSCOPY W/ DEBRIDEMENT      KNEE SURGERY Left 05/01/2017    TKR    LEFT HEART CATHETERIZATION Left 07/09/2019    Procedure: Left heart cath;  Surgeon: Ronak Gibbons MD;  Location: [facility] CATH LAB;  Service: Cardiology;  Laterality: Left;    LEFT HEART CATHETERIZATION Left 10/18/2021    Procedure: Left heart cath;  Surgeon: Jamar Haddad MD;  Location: [facility] CATH LAB;  Service: Cardiology;  Laterality: Left;    PHACOEMULSIFICATION OF CATARACT Right 11/06/2024    Procedure: PHACOEMULSIFICATION, CATARACT;  Surgeon: Sylvia Dela Cruz MD;  [facility];  Service: Ophthalmology;  Laterality: Right;    TOTAL KNEE ARTHROPLASTY Right 03/09/2023    Procedure: ARTHROPLASTY, KNEE, TOTAL:RIGHT;  Surgeon: Peterson Sharma MD;  Location: [facility] OR 2ND FLR;  Service: Orthopedics;  Laterality: Right;    TRANSFORAMINAL EPIDURAL INJECTION OF STEROID Left 11/15/2023    Procedure: LUMBAR TRANSFORAMINAL LEFT L2/3 AND L3/4 DIRECT REFERRAL *PLAVIX CLEARANCE IN CHART*;  Surgeon: Frank Ken MD;  Location: [facility];  Service: Pain Management;  Laterality: Left;       Social History:  Tobacco Use: Low Risk  (2/15/2025)    Patient History     Smoking Tobacco Use: Never     Smokeless Tobacco Use: Never     Passive Exposure: Never       Alcohol Use: Not At Risk (2/12/2025)    AUDIT-C     Frequency of Alcohol Consumption: 2-4 times a month     Average Number of Drinks: 1 or 2     Frequency of Binge Drinking: Never       OBJECTIVE:     Vital Signs Range:  BMI Readings from Last 1 Encounters:   02/04/25 42.50 kg/m²                Significant Labs:        Component Value Date/Time    WBC 6.78 11/20/2024 2239    HGB 11.1 (L) 11/20/2024 2239    HCT 35 (L) 11/20/2024 2245    HCT 33.6 (L) 11/20/2024 2239     11/20/2024 2239     01/24/2025 1505    K 4.6 01/24/2025 1505     01/24/2025 1505    CO2 24 01/24/2025 1505     (H) 01/24/2025 1505    BUN 21 01/24/2025 1505    CREATININE 1.2 01/24/2025 1505    MG 1.8 06/21/2022 0501    PHOS 2.9 11/11/2024 1640    CALCIUM 10.0 01/24/2025 1505    ALBUMIN 3.5 01/24/2025 1505    PROT 8.5 (H) 01/24/2025 1505    ALKPHOS 77 01/24/2025 1505    BILITOT 0.4 01/24/2025 1505    AST 23 01/24/2025 1505    ALT 18 01/24/2025 1505    INR 1.1 02/16/2023 1258    HGBA1C 6.7 (H) 01/24/2025 1505        Please see Results Review for additional labs.     Diagnostic Studies: No relevant studies.    EKG:   Results for orders placed or performed in visit on 06/13/24   IN OFFICE EKG 12-LEAD (to [place])    Collection Time: 06/13/24 12:40 PM   Result Value Ref Range    QRS Duration 72 ms    [facility] QTC Calculation 444 ms    Narrative    Test Reason : R07.9,    Vent. Rate : 098 BPM     Atrial Rate : 098 BPM     P-R Int : 248 ms          QRS Dur : 072 ms      QT Int : 348 ms       P-R-T Axes : 000 -22 038 degrees     QTc Int : 444 ms    Sinus rhythm with 1st degree A-V block  Otherwise normal ECG  When compared with ECG of 16-FEB-2023 12:45,  Premature supraventricular complexes are no longer Present  Confirmed by Lennie Barrera MD ([id number]) on 6/13/2024 2:37:23 PM    Referred By:             Confirmed By:Lennie Barrera MD       ECHO:  See subjective, if available.  The left ventricle is normal in size with concentric remodeling and normal systolic function.  The estimated ejection fraction is 68%.  Indeterminate left ventricular diastolic function.  Normal right ventricular size with normal right ventricular systolic function.  There is mild aortic valve stenosis.  Aortic valve area is 2.18 cm2; peak velocity is 2.04  m/s; mean gradient is 11 mmHg.  Intermediate central venous pressure (8 mmHg).  The estimated PA systolic pressure is 27 mmHg.    ASSESSMENT/PLAN:                                                                                                                02/25/2025  Kaylee Romero is a 80 y.o., female.      Pre-op Assessment    I have reviewed the Patient Summary Reports.     I have reviewed the Nursing Notes. I have reviewed the NPO Status.   I have reviewed the Medications.     Review of Systems  Anesthesia Hx:  No problems with previous Anesthesia   History of prior surgery of interest to airway management or planning:          Denies Family Hx of Anesthesia complications.    Denies Personal Hx of Anesthesia complications.                    Hematology/Oncology:  Hematology Normal   Oncology Normal                                   EENT/Dental:  EENT/Dental Normal           Cardiovascular:  Exercise tolerance: good   Hypertension   CAD   CABG/stent   Angina     hyperlipidemia   ECG has been reviewed.                            Pulmonary:        Sleep Apnea, CPAP                Renal/:  Chronic Renal Disease, CKD                Hepatic/GI:  Hepatic/GI Normal        Taking GLP-1 Agonists            Musculoskeletal:  Arthritis               Neurological:  Neurology Normal                                      Endocrine:  Diabetes, type 2           Dermatological:  Skin Normal    Psych:  Psychiatric Normal                    Physical Exam  General: Well nourished, Cooperative, Alert and Oriented    Airway:  Mallampati: II   Mouth Opening: Normal  TM Distance: Normal  Tongue: Normal  Neck ROM: Normal ROM    Dental:  Intact        Anesthesia Plan  Type of Anesthesia, risks & benefits discussed:    Anesthesia Type: Gen ETT, Gen Supraglottic Airway  Intra-op Monitoring Plan: Standard ASA Monitors  Post Op Pain Control Plan: multimodal analgesia and IV/PO Opioids PRN  Induction:  IV  Informed Consent: Informed  consent signed with the Patient and all parties understand the risks and agree with anesthesia plan.  All questions answered.   ASA Score: 3  Day of Surgery Review of History & Physical: H&P Update referred to the surgeon/provider.    Ready For Surgery From Anesthesia Perspective.     .

## 2025-02-26 ENCOUNTER — HOSPITAL ENCOUNTER (OUTPATIENT)
Facility: HOSPITAL | Age: 80
Discharge: HOME | End: 2025-02-26
Attending: OPHTHALMOLOGY | Admitting: OPHTHALMOLOGY
Payer: MEDICARE

## 2025-02-26 ENCOUNTER — ANESTHESIA (OUTPATIENT)
Dept: SURGERY | Facility: HOSPITAL | Age: 80
End: 2025-02-26
Payer: MEDICARE

## 2025-02-26 VITALS
WEIGHT: 230 LBS | RESPIRATION RATE: 17 BRPM | HEART RATE: 81 BPM | SYSTOLIC BLOOD PRESSURE: 129 MMHG | HEIGHT: 62 IN | BODY MASS INDEX: 42.33 KG/M2 | DIASTOLIC BLOOD PRESSURE: 71 MMHG | TEMPERATURE: 98 F | OXYGEN SATURATION: 96 %

## 2025-02-26 DIAGNOSIS — H57.89 ABNORMAL RED REFLEX OF EYE: ICD-10-CM

## 2025-02-26 DIAGNOSIS — H52.31 ANISOMETROPIA: ICD-10-CM

## 2025-02-26 DIAGNOSIS — H25.12 NUCLEAR SCLEROTIC CATARACT OF LEFT EYE: Primary | ICD-10-CM

## 2025-02-26 DIAGNOSIS — H25.012 CORTICAL SENILE CATARACT OF LEFT EYE: ICD-10-CM

## 2025-02-26 LAB
POCT GLUCOSE: 188 MG/DL (ref 70–110)
POCT GLUCOSE: 189 MG/DL (ref 70–110)

## 2025-02-26 PROCEDURE — V2632 POST CHMBR INTRAOCULAR LENS: HCPCS | Performed by: OPHTHALMOLOGY

## 2025-02-26 PROCEDURE — 25000003 PHARM REV CODE 250

## 2025-02-26 PROCEDURE — 37000009 HC ANESTHESIA EA ADD 15 MINS: Performed by: OPHTHALMOLOGY

## 2025-02-26 PROCEDURE — 82962 GLUCOSE BLOOD TEST: CPT | Performed by: OPHTHALMOLOGY

## 2025-02-26 PROCEDURE — 36000706: Performed by: OPHTHALMOLOGY

## 2025-02-26 PROCEDURE — 37000008 HC ANESTHESIA 1ST 15 MINUTES: Performed by: OPHTHALMOLOGY

## 2025-02-26 PROCEDURE — 63600175 PHARM REV CODE 636 W HCPCS: Performed by: OPHTHALMOLOGY

## 2025-02-26 PROCEDURE — 63600175 PHARM REV CODE 636 W HCPCS

## 2025-02-26 PROCEDURE — 71000044 HC DOSC ROUTINE RECOVERY FIRST HOUR: Performed by: OPHTHALMOLOGY

## 2025-02-26 PROCEDURE — 25000003 PHARM REV CODE 250: Performed by: OPHTHALMOLOGY

## 2025-02-26 PROCEDURE — 71000015 HC POSTOP RECOV 1ST HR: Performed by: OPHTHALMOLOGY

## 2025-02-26 PROCEDURE — 36000707: Performed by: OPHTHALMOLOGY

## 2025-02-26 PROCEDURE — 66982 XCAPSL CTRC RMVL CPLX WO ECP: CPT | Mod: LT,,, | Performed by: OPHTHALMOLOGY

## 2025-02-26 DEVICE — LENS EYHANCE +23.5D: Type: IMPLANTABLE DEVICE | Site: EYE | Status: FUNCTIONAL

## 2025-02-26 RX ORDER — LIDOCAINE HYDROCHLORIDE 40 MG/ML
INJECTION, SOLUTION RETROBULBAR
Status: DISCONTINUED | OUTPATIENT
Start: 2025-02-26 | End: 2025-02-26 | Stop reason: HOSPADM

## 2025-02-26 RX ORDER — TROPICAMIDE 10 MG/ML
1 SOLUTION/ DROPS OPHTHALMIC
Status: DISCONTINUED | OUTPATIENT
Start: 2025-02-26 | End: 2025-02-26 | Stop reason: HOSPADM

## 2025-02-26 RX ORDER — LIDOCAINE HYDROCHLORIDE 40 MG/ML
INJECTION, SOLUTION RETROBULBAR
Status: DISCONTINUED
Start: 2025-02-26 | End: 2025-02-26 | Stop reason: HOSPADM

## 2025-02-26 RX ORDER — HYDROMORPHONE HYDROCHLORIDE 1 MG/ML
0.2 INJECTION, SOLUTION INTRAMUSCULAR; INTRAVENOUS; SUBCUTANEOUS EVERY 5 MIN PRN
Refills: 0 | Status: DISCONTINUED | OUTPATIENT
Start: 2025-02-26 | End: 2025-02-26 | Stop reason: HOSPADM

## 2025-02-26 RX ORDER — ACETAMINOPHEN 325 MG/1
650 TABLET ORAL EVERY 6 HOURS PRN
Status: DISCONTINUED | OUTPATIENT
Start: 2025-02-26 | End: 2025-02-26 | Stop reason: HOSPADM

## 2025-02-26 RX ORDER — PROPOFOL 10 MG/ML
VIAL (ML) INTRAVENOUS
Status: DISCONTINUED | OUTPATIENT
Start: 2025-02-26 | End: 2025-02-26

## 2025-02-26 RX ORDER — MOXIFLOXACIN 5 MG/ML
1 SOLUTION/ DROPS OPHTHALMIC
Status: DISCONTINUED | OUTPATIENT
Start: 2025-02-26 | End: 2025-02-26 | Stop reason: HOSPADM

## 2025-02-26 RX ORDER — PREDNISOLONE ACETATE 10 MG/ML
SUSPENSION/ DROPS OPHTHALMIC
Status: DISCONTINUED
Start: 2025-02-26 | End: 2025-02-26 | Stop reason: HOSPADM

## 2025-02-26 RX ORDER — VASOPRESSIN 20 [USP'U]/ML
INJECTION, SOLUTION INTRAMUSCULAR; SUBCUTANEOUS
Status: DISCONTINUED | OUTPATIENT
Start: 2025-02-26 | End: 2025-02-26

## 2025-02-26 RX ORDER — GLUCAGON 1 MG
1 KIT INJECTION
Status: DISCONTINUED | OUTPATIENT
Start: 2025-02-26 | End: 2025-02-26 | Stop reason: HOSPADM

## 2025-02-26 RX ORDER — OXYCODONE HYDROCHLORIDE 5 MG/1
10 TABLET ORAL EVERY 4 HOURS PRN
Refills: 0 | Status: DISCONTINUED | OUTPATIENT
Start: 2025-02-26 | End: 2025-02-26 | Stop reason: HOSPADM

## 2025-02-26 RX ORDER — LIDOCAINE HYDROCHLORIDE 20 MG/ML
INJECTION, SOLUTION EPIDURAL; INFILTRATION; INTRACAUDAL; PERINEURAL
Status: DISCONTINUED | OUTPATIENT
Start: 2025-02-26 | End: 2025-02-26

## 2025-02-26 RX ORDER — FENTANYL CITRATE 50 UG/ML
INJECTION, SOLUTION INTRAMUSCULAR; INTRAVENOUS
Status: DISCONTINUED | OUTPATIENT
Start: 2025-02-26 | End: 2025-02-26

## 2025-02-26 RX ORDER — KETOROLAC TROMETHAMINE 5 MG/ML
1 SOLUTION OPHTHALMIC
Status: DISCONTINUED | OUTPATIENT
Start: 2025-02-26 | End: 2025-02-26 | Stop reason: HOSPADM

## 2025-02-26 RX ORDER — PROPARACAINE HYDROCHLORIDE 5 MG/ML
1 SOLUTION/ DROPS OPHTHALMIC
Status: DISCONTINUED | OUTPATIENT
Start: 2025-02-26 | End: 2025-02-26 | Stop reason: HOSPADM

## 2025-02-26 RX ORDER — PHENYLEPHRINE HYDROCHLORIDE 100 MG/ML
1 SOLUTION/ DROPS OPHTHALMIC
Status: DISCONTINUED | OUTPATIENT
Start: 2025-02-26 | End: 2025-02-26 | Stop reason: HOSPADM

## 2025-02-26 RX ORDER — LIDOCAINE HYDROCHLORIDE 10 MG/ML
INJECTION, SOLUTION EPIDURAL; INFILTRATION; INTRACAUDAL; PERINEURAL
Status: DISCONTINUED | OUTPATIENT
Start: 2025-02-26 | End: 2025-02-26 | Stop reason: HOSPADM

## 2025-02-26 RX ORDER — LIDOCAINE HYDROCHLORIDE 10 MG/ML
INJECTION, SOLUTION EPIDURAL; INFILTRATION; INTRACAUDAL; PERINEURAL
Status: DISCONTINUED
Start: 2025-02-26 | End: 2025-02-26 | Stop reason: HOSPADM

## 2025-02-26 RX ORDER — PREDNISOLONE ACETATE 10 MG/ML
SUSPENSION/ DROPS OPHTHALMIC
Status: DISCONTINUED | OUTPATIENT
Start: 2025-02-26 | End: 2025-02-26 | Stop reason: HOSPADM

## 2025-02-26 RX ORDER — PHENYLEPHRINE HCL IN 0.9% NACL 1 MG/10 ML
SYRINGE (ML) INTRAVENOUS
Status: DISCONTINUED | OUTPATIENT
Start: 2025-02-26 | End: 2025-02-26

## 2025-02-26 RX ORDER — ONDANSETRON HYDROCHLORIDE 2 MG/ML
INJECTION, SOLUTION INTRAVENOUS
Status: DISCONTINUED | OUTPATIENT
Start: 2025-02-26 | End: 2025-02-26

## 2025-02-26 RX ADMIN — PROPARACAINE HYDROCHLORIDE 1 DROP: 5 SOLUTION/ DROPS OPHTHALMIC at 08:02

## 2025-02-26 RX ADMIN — PHENYLEPHRINE HYDROCHLORIDE 1 DROP: 100 SOLUTION/ DROPS OPHTHALMIC at 08:02

## 2025-02-26 RX ADMIN — KETOROLAC TROMETHAMINE 1 DROP: 5 SOLUTION/ DROPS OPHTHALMIC at 09:02

## 2025-02-26 RX ADMIN — ONDANSETRON 4 MG: 2 INJECTION INTRAMUSCULAR; INTRAVENOUS at 09:02

## 2025-02-26 RX ADMIN — Medication 100 MCG: at 09:02

## 2025-02-26 RX ADMIN — TROPICAMIDE 1 DROP: 10 SOLUTION/ DROPS OPHTHALMIC at 09:02

## 2025-02-26 RX ADMIN — Medication 200 MCG: at 09:02

## 2025-02-26 RX ADMIN — VASOPRESSIN 2 UNITS: 20 INJECTION INTRAVENOUS at 09:02

## 2025-02-26 RX ADMIN — KETOROLAC TROMETHAMINE 1 DROP: 5 SOLUTION/ DROPS OPHTHALMIC at 08:02

## 2025-02-26 RX ADMIN — SODIUM CHLORIDE: 0.9 INJECTION, SOLUTION INTRAVENOUS at 09:02

## 2025-02-26 RX ADMIN — LIDOCAINE HYDROCHLORIDE 100 MG: 20 INJECTION, SOLUTION EPIDURAL; INFILTRATION; INTRACAUDAL at 09:02

## 2025-02-26 RX ADMIN — TROPICAMIDE 1 DROP: 10 SOLUTION/ DROPS OPHTHALMIC at 08:02

## 2025-02-26 RX ADMIN — MOXIFLOXACIN OPHTHALMIC 1 DROP: 5 SOLUTION/ DROPS OPHTHALMIC at 08:02

## 2025-02-26 RX ADMIN — GLYCOPYRROLATE 0.2 MG: 0.2 INJECTION, SOLUTION INTRAMUSCULAR; INTRAVENOUS at 09:02

## 2025-02-26 RX ADMIN — FENTANYL CITRATE 50 MCG: 50 INJECTION INTRAMUSCULAR; INTRAVENOUS at 09:02

## 2025-02-26 RX ADMIN — PROPOFOL 150 MG: 10 INJECTION, EMULSION INTRAVENOUS at 09:02

## 2025-02-26 RX ADMIN — PHENYLEPHRINE HYDROCHLORIDE 1 DROP: 100 SOLUTION/ DROPS OPHTHALMIC at 09:02

## 2025-02-26 RX ADMIN — MOXIFLOXACIN OPHTHALMIC 1 DROP: 5 SOLUTION/ DROPS OPHTHALMIC at 09:02

## 2025-02-26 NOTE — PROGRESS NOTES
Discharge instructions given and explained to patient and family with verbalized understanding. VSS.  Denies N/V, tolerating PO fluids. Rates pain level as tolerable. IV removed. Ambulated to bathroom with standby assistance.  at bedside helping pt get dressed.

## 2025-02-26 NOTE — INTERVAL H&P NOTE
H&P completed on 2/13/2025 has been reviewed, the patient examined and I concur with the findings and plan.

## 2025-02-26 NOTE — ANESTHESIA PROCEDURE NOTES
Intubation    Date/Time: 2/26/2025 9:19 AM    Performed by: Onur Alonzo CRNA  Authorized by: Veronica Yates MD    Intubation:     Induction:  Intravenous    Intubated:  Postinduction    Mask Ventilation:  Easy mask    Attempts:  1    Attempted By:  CRNA    Difficult Airway Encountered?: No      Complications:  None    Airway Device:  Supraglottic airway/LMA    Airway Device Size:  4.0    Secured at:  The lips    Placement Verified By:  Capnometry    Complicating Factors:  None    Findings Post-Intubation:  Atraumatic/condition of teeth unchanged and BS equal bilateral

## 2025-02-26 NOTE — TRANSFER OF CARE
"Anesthesia Transfer of Care Note    Patient: Kaylee Romero    Procedure(s) Performed: Procedure(s) (LRB):  PHACOEMULSIFICATION, CATARACT, WITH IOL INSERTION (Left)    Patient location: [facility]    Anesthesia Type: general    Transport from OR: Transported from OR on 6-10 L/min O2 by face mask with adequate spontaneous ventilation    Post pain: adequate analgesia    Post assessment: no apparent anesthetic complications    Post vital signs: stable    Level of consciousness: awake    Nausea/Vomiting: no nausea/vomiting    Complications: none    Transfer of care protocol was followed      Last vitals: Visit Vitals  /74 (Patient Position: Lying)   Pulse 89   Temp 36.5 °C (97.7 °F) (Temporal)   Resp 16   Ht 5' 2" (1.575 m)   Wt 104.3 kg (230 lb)   LMP 01/09/2001 (Approximate)   SpO2 100%   Breastfeeding No   BMI 42.07 kg/m²     "

## 2025-02-26 NOTE — H&P
Interim H&P Update:  Pt presents for surgery; history and physical was reviewed and no changes have been made. Pt is awake, alert, and has no current complaints or other problems. H&P is less than 30 days old and is accurate and patient is ready to proceed with surgery. Will proceed to the OR as scheduled for CEIOL left eye.    No interval change     Peterson Burgess MD MSc  [employer] Ophthalmology PGY2

## 2025-02-26 NOTE — ANESTHESIA POSTPROCEDURE EVALUATION
Anesthesia Post Evaluation    Patient: Kaylee Romero    Procedure(s) Performed: Procedure(s) (LRB):  PHACOEMULSIFICATION, CATARACT, WITH IOL INSERTION (Left)    Final Anesthesia Type: general      Patient location during evaluation: PACU  Patient participation: Yes- Able to Participate  Level of consciousness: awake  Post-procedure vital signs: reviewed and stable  Pain management: adequate  Airway patency: patent    PONV status at discharge: No PONV  Anesthetic complications: no      Cardiovascular status: blood pressure returned to baseline  Respiratory status: unassisted, spontaneous ventilation and room air                Vitals Value Taken Time   /71 02/26/25 10:30   Temp 36.6 °C (97.9 °F) 02/26/25 10:30   Pulse 81 02/26/25 10:30   Resp 17 02/26/25 10:30   SpO2 96 % 02/26/25 10:30         No case tracking events are documented in the log.      Pain/Edwin Score: Edwin Score: 9 (2/26/2025 10:15 AM)

## 2025-02-26 NOTE — OP NOTE
DATE OF PROCEDURE: 2/26/2025    PREOPERATIVE DIAGNOSIS: mixed cortical and nuclear Cataract - poor red rflex // Nuclear sclerotic cataract of left eye OS.     POSTOPERATIVE DIAGNOSIS: mixed cortical and nuclear Cataract/ - poor red reflex / Nuclear sclerotic cataract of left eyeOS, status post procedure.     PROCEDURE PERFORMED: Cataract extraction with trypan blue and phacoemulsification, posterior   chamber intraocular lens placement.     SURGEON: Sylvia Dela Cruz M.D.     ASSISTANT:  MD Aditya       COMPLICATIONS: None.     BLOOD LOSS: Less than 5 mL.     ANESTHESIA: general    IMPLANT DATA:   1. DIB00, power 23.5 diopters, serial #    PROCEDURE IN DETAIL: After informed consent including risks, benefits,   alternatives, the patient was brought to the operating room table, placed in   supine position. General  anesthesia care was used throughout the entire   procedure. Once adequate anesthesia was confirmed, the patient was then prepped   and draped in usual sterile fashion for intraocular surgery. Wire speculum was   used to hold the eyelids apart and the procedure was initiated by making   a partial thickness corneal wound with a halley blade temporally.   A supersharp blade was then used to make a second entry into the eye via a paracentesis incision.  Intracameral lidocaine followed by trypan blue, followed by  Viscoat were placed in the anterior chamber.   A 2.4 mm blade was then used to make to complete the clear corneal   incision temporally. A cystotome was used to make a tear in   the anterior capsular flap, which was continued around with Utrata forceps for   continuous curvilinear capsulorrhexis. BSS on a Molina cannula was then used for   hydrodissection and hydrodelineation of lens. The lens was noted to spin   freely in the bag. Phacoemulsification handpiece was then used to remove the   lens in a divide-and-conquer manner. Irrigation aspiration handpiece removed   the remaining cortical  material. Provisc was placed in the eye followed by the   lens as mentioned above without any complications. Once the lens was in proper   position, irrigation aspiration handpiece was used to remove the remaining Provisc. The   wounds were then hydrated with BSS and noted to be watertight. The eye had a   good physiological pressure and the lid speculum was removed under the   microscope without any shallowing. The eye was then covered with a collagen   shield soaked in Pred Forte and Vigamox and turned over to Anesthesia in stable   condition after placement of patch and shield.

## 2025-02-26 NOTE — DISCHARGE SUMMARY
[facility] - [facility] (1st Fl)  Discharge Note  Short Stay    Procedure(s) (LRB):  PHACOEMULSIFICATION, CATARACT, WITH IOL INSERTION (Left)      OUTCOME: Patient tolerated treatment/procedure well without complication and is now ready for discharge.    DISPOSITION: Home or Self Care    FINAL DIAGNOSIS:  Nuclear sclerotic cataract of left eye    FOLLOWUP: In clinic    DISCHARGE INSTRUCTIONS:    Discharge Procedure Orders   Leave dressing on - Keep it clean, dry, and intact until clinic visit        TIME SPENT ON DISCHARGE: 10 minutes

## 2025-02-27 ENCOUNTER — OFFICE VISIT (OUTPATIENT)
Dept: OPHTHALMOLOGY | Facility: CLINIC | Age: 80
End: 2025-02-27
Payer: MEDICARE

## 2025-02-27 DIAGNOSIS — Z98.49 POSTOPERATIVE CARE FOR CATARACT: Primary | ICD-10-CM

## 2025-02-27 DIAGNOSIS — Z96.1 PSEUDOPHAKIA, BOTH EYES: ICD-10-CM

## 2025-02-27 DIAGNOSIS — Z48.810 POSTOPERATIVE CARE FOR CATARACT: Primary | ICD-10-CM

## 2025-02-27 DIAGNOSIS — G47.33 OSA ON CPAP: ICD-10-CM

## 2025-02-27 DIAGNOSIS — E11.9 TYPE 2 DIABETES MELLITUS WITHOUT OPHTHALMIC MANIFESTATIONS: ICD-10-CM

## 2025-02-27 DIAGNOSIS — H43.813 PVD (POSTERIOR VITREOUS DETACHMENT), BOTH EYES: ICD-10-CM

## 2025-02-27 PROCEDURE — 1101F PT FALLS ASSESS-DOCD LE1/YR: CPT | Mod: CPTII,S$GLB,, | Performed by: OPHTHALMOLOGY

## 2025-02-27 PROCEDURE — 1126F AMNT PAIN NOTED NONE PRSNT: CPT | Mod: CPTII,S$GLB,, | Performed by: OPHTHALMOLOGY

## 2025-02-27 PROCEDURE — 99999 PR PBB SHADOW E&M-EST. PATIENT-LVL III: CPT | Mod: PBBFAC,,, | Performed by: OPHTHALMOLOGY

## 2025-02-27 PROCEDURE — 3288F FALL RISK ASSESSMENT DOCD: CPT | Mod: CPTII,S$GLB,, | Performed by: OPHTHALMOLOGY

## 2025-02-27 PROCEDURE — 1160F RVW MEDS BY RX/DR IN RCRD: CPT | Mod: CPTII,S$GLB,, | Performed by: OPHTHALMOLOGY

## 2025-02-27 PROCEDURE — 1159F MED LIST DOCD IN RCRD: CPT | Mod: CPTII,S$GLB,, | Performed by: OPHTHALMOLOGY

## 2025-02-27 PROCEDURE — 99024 POSTOP FOLLOW-UP VISIT: CPT | Mod: S$GLB,,, | Performed by: OPHTHALMOLOGY

## 2025-02-27 RX ORDER — PREDNISOLONE ACETATE 10 MG/ML
1 SUSPENSION/ DROPS OPHTHALMIC 4 TIMES DAILY
COMMUNITY
Start: 2025-02-27

## 2025-02-27 RX ORDER — MOXIFLOXACIN 5 MG/ML
1 SOLUTION/ DROPS OPHTHALMIC 4 TIMES DAILY
COMMUNITY
Start: 2025-02-27

## 2025-02-27 NOTE — PROGRESS NOTES
HPI    DLS: 2/13/2025    Pt here for 1 day post phaco w/IOL OS- 2/26/2025  S/P phaco w/IOL OD- 11/06/2024  Pt states no eye pain or discomfort.     Meds;  No GTTS    1. NS OS   2. PVD OU   3. Retinal Drusen OU   4. Type 2 DM no DR   5. KAMRAN (on CPAP)   6. Essential HTN   7. Presbyopia   8. PCIOL OD       Last edited by Saniya Seymour on 2/27/2025 11:49 AM.            Assessment /Plan     For exam results, see Encounter Report.    Postoperative care for cataract    Type 2 diabetes mellitus without ophthalmic manifestations    PVD (posterior vitreous detachment), both eyes    Pseudophakia, both eyes    KAMRAN on CPAP        This patient is the wife of a long standing glaucoma patient of mine   Want to continue with me for yearly checks       PVD (posterior vitreous detachment), bilateral  Retinal drusen of both eyes  Arcus senilis of both corneas  Type 2 diabetes mellitus without ophthalmic manifestations  Essential hypertension              No retinopathy, monitor yearly   // OD done 11/6/2024             visually significant at this time OD>OS     Consider cat sx OD first     KAMRAN on CPAP     Presbyopia              Rx specs    Pt C/O blurry vision and glare - OD>OS   Has been getting worse over the last year   Visually sign OU -     Discussed options and pt would like to have phaco/IOL od first -   Pt is hyperopic with slight stigmatism ou   Discussed IOL options   Pt declines a mutifocal or toric IOL   Prefers a monofocal IOL - set for distance plano to -0.5     Pt is very anxious and does not want to be aware of anything that is going on. She is requesting general anesthesia if possible. She is very squeamish about her eyes and is afraid she will not be able to be still.     HOLD TRULICITY FOR > 1 WEEK BEFORE SURGERY - pt is aware that if she does not hold this that the surgery will have to be canceled and re-scheduled - because of a increase in anesthetic risk .     IOL calcs   OD  DIB00 24.0  MTA4U0 20.0    Pt is  referring 2 patients to me   Beena Nicole - h/o poor result post CE elsewhere   ? Harriet Moraes ( I use to see this patients mother Ms Eugenia treviño - who has passed)     Phaco / IOL OD Date: 11/6/2024 - general anesthesia -   POM 3    - phaco/IOL   Off gtts       Pt feels off balance - decrease depth perception   Would like CE - 2nd eye - OS   Wants sx feb 26th     IOL calcs OS - 2nd eye   DIB00 23.5  MTA4U0 20.0    Phaco / IOL OS Date: 2/26/2025   POD # 1 - phaco/IOL   Doing well  Begin Pred Acetate QID   Begin vigamox QID  Shield at night  Glasses day  No lifting, no bending, no eye rubbing  F/U 1 week, AR/MR ou

## 2025-03-02 NOTE — PROGRESS NOTES
HPI    DLS: 2/27/2025    Pt here for post phaco w/IOL OS- 2/26/2025  S/P phaco w/IOL OD- 11/06/2024    Pt states no eye pain or discomfort and vision is doing good.     Meds:  PA QID OS  Vigamox QID OS           1. PVD OU   2. Retinal Drusen OU   3. Type 2 DM no DR   4. KAMRAN (on CPAP)   5. Essential HTN   6. Presbyopia   7. PCIOL OU     Last edited by Sylvia Dela Cruz MD on 3/6/2025  2:19 PM.            Assessment /Plan     For exam results, see Encounter Report.    Postoperative care for cataract    Type 2 diabetes mellitus without ophthalmic manifestations    PVD (posterior vitreous detachment), both eyes    KAMRAN on CPAP    Pseudophakia, both eyes          This patient is the wife of a long standing glaucoma patient of mine   Want to continue with me for yearly checks       PVD (posterior vitreous detachment), bilateral  Retinal drusen of both eyes  Arcus senilis of both corneas  Type 2 diabetes mellitus without ophthalmic manifestations  Essential hypertension              No retinopathy, monitor yearly   // OD done 11/6/2024             visually significant at this time OD>OS     Consider cat sx OD first     KAMRAN on CPAP     Presbyopia              Rx specs    Pt C/O blurry vision and glare - OD>OS   Has been getting worse over the last year   Visually sign OU -     Discussed options and pt would like to have phaco/IOL od first -   Pt is hyperopic with slight stigmatism ou   Discussed IOL options   Pt declines a mutifocal or toric IOL   Prefers a monofocal IOL - set for distance plano to -0.5     Pt is very anxious and does not want to be aware of anything that is going on. She is requesting general anesthesia if possible. She is very squeamish about her eyes and is afraid she will not be able to be still.     HOLD TRULICITY FOR > 1 WEEK BEFORE SURGERY - pt is aware that if she does not hold this that the surgery will have to be canceled and re-scheduled - because of a increase in anesthetic risk .     IOL  calcs   OD  DIB00 24.0  MTA4U0 20.0    Pt is referring 2 patients to me   Benea Nicole - h/o poor result post CE elsewhere   ? Harriet Moraes ( I use to see this patients mother Ms Eugenia treviño - who has passed)     Phaco / IOL OD Date: 11/6/2024 - general anesthesia -   POM 3    - phaco/IOL   Off gtts       Pt feels off balance - decrease depth perception   Would like CE - 2nd eye - OS   Wants sx feb 26th     IOL calcs OS - 2nd eye   DIB00 23.5  MTA4U0 20.0    Phaco / IOL OS Date: 2/26/2025   POW  # 1 - phaco/IOL   Doing well  Begin Pred Acetate QID - begin taper - decrease every 2 weeks (has extra at home from [facility] - she will message us if she runs out too soon )   Begin vigamox QID - stop   Shield at night- 1 more week   Glasses day  No lifting, no bending, no eye rubbing    F/U 6  week, AR/MR ou

## 2025-03-06 ENCOUNTER — OFFICE VISIT (OUTPATIENT)
Dept: OPHTHALMOLOGY | Facility: CLINIC | Age: 80
End: 2025-03-06
Payer: MEDICARE

## 2025-03-06 DIAGNOSIS — Z98.49 POSTOPERATIVE CARE FOR CATARACT: Primary | ICD-10-CM

## 2025-03-06 DIAGNOSIS — Z96.1 PSEUDOPHAKIA, BOTH EYES: ICD-10-CM

## 2025-03-06 DIAGNOSIS — H43.813 PVD (POSTERIOR VITREOUS DETACHMENT), BOTH EYES: ICD-10-CM

## 2025-03-06 DIAGNOSIS — Z48.810 POSTOPERATIVE CARE FOR CATARACT: Primary | ICD-10-CM

## 2025-03-06 DIAGNOSIS — G47.33 OSA ON CPAP: ICD-10-CM

## 2025-03-06 DIAGNOSIS — E11.9 TYPE 2 DIABETES MELLITUS WITHOUT OPHTHALMIC MANIFESTATIONS: ICD-10-CM

## 2025-03-06 PROCEDURE — 1101F PT FALLS ASSESS-DOCD LE1/YR: CPT | Mod: CPTII,S$GLB,, | Performed by: OPHTHALMOLOGY

## 2025-03-06 PROCEDURE — 1160F RVW MEDS BY RX/DR IN RCRD: CPT | Mod: CPTII,S$GLB,, | Performed by: OPHTHALMOLOGY

## 2025-03-06 PROCEDURE — 1126F AMNT PAIN NOTED NONE PRSNT: CPT | Mod: CPTII,S$GLB,, | Performed by: OPHTHALMOLOGY

## 2025-03-06 PROCEDURE — 99024 POSTOP FOLLOW-UP VISIT: CPT | Mod: S$GLB,,, | Performed by: OPHTHALMOLOGY

## 2025-03-06 PROCEDURE — 99999 PR PBB SHADOW E&M-EST. PATIENT-LVL III: CPT | Mod: PBBFAC,,, | Performed by: OPHTHALMOLOGY

## 2025-03-06 PROCEDURE — 1159F MED LIST DOCD IN RCRD: CPT | Mod: CPTII,S$GLB,, | Performed by: OPHTHALMOLOGY

## 2025-03-06 PROCEDURE — 3288F FALL RISK ASSESSMENT DOCD: CPT | Mod: CPTII,S$GLB,, | Performed by: OPHTHALMOLOGY

## 2025-03-10 ENCOUNTER — RESULTS FOLLOW-UP (OUTPATIENT)
Dept: ENDOCRINOLOGY | Facility: CLINIC | Age: 80
End: 2025-03-10

## 2025-03-15 DIAGNOSIS — E11.22 TYPE 2 DIABETES MELLITUS WITH STAGE 3A CHRONIC KIDNEY DISEASE, WITH LONG-TERM CURRENT USE OF INSULIN: ICD-10-CM

## 2025-03-15 DIAGNOSIS — Z79.4 TYPE 2 DIABETES MELLITUS WITH STAGE 3A CHRONIC KIDNEY DISEASE, WITH LONG-TERM CURRENT USE OF INSULIN: ICD-10-CM

## 2025-03-15 DIAGNOSIS — N18.31 TYPE 2 DIABETES MELLITUS WITH STAGE 3A CHRONIC KIDNEY DISEASE, WITH LONG-TERM CURRENT USE OF INSULIN: ICD-10-CM

## 2025-03-16 ENCOUNTER — PATIENT MESSAGE (OUTPATIENT)
Dept: ENDOCRINOLOGY | Facility: CLINIC | Age: 80
End: 2025-03-16
Payer: MEDICARE

## 2025-03-17 RX ORDER — GLIPIZIDE 5 MG/1
TABLET ORAL
Qty: 360 TABLET | Refills: 3 | Status: SHIPPED | OUTPATIENT
Start: 2025-03-17

## 2025-03-19 NOTE — TELEPHONE ENCOUNTER
"Trulicity 4.5 mg weekly/Ozempic 2 mg weekly  Jardiance 10 mg daily      Other medications tried:  Metformin - "terrible" GI side effects, diarrhea and fatigue  Victoza- injection site complications  Basaglar 42 units daily   Glipizide no longer taking   Ozempic - too much weight loss          Per your notes patient does not take Glipizide anymore.  "

## 2025-03-23 ENCOUNTER — OFFICE VISIT (OUTPATIENT)
Dept: URGENT CARE | Facility: CLINIC | Age: 80
End: 2025-03-23
Payer: MEDICARE

## 2025-03-23 VITALS
HEIGHT: 62 IN | OXYGEN SATURATION: 100 % | TEMPERATURE: 98 F | WEIGHT: 230 LBS | DIASTOLIC BLOOD PRESSURE: 66 MMHG | RESPIRATION RATE: 20 BRPM | HEART RATE: 77 BPM | BODY MASS INDEX: 42.33 KG/M2 | SYSTOLIC BLOOD PRESSURE: 131 MMHG

## 2025-03-23 DIAGNOSIS — S80.812A ABRASION OF LEFT LOWER EXTREMITY, INITIAL ENCOUNTER: Primary | ICD-10-CM

## 2025-03-23 PROCEDURE — 99213 OFFICE O/P EST LOW 20 MIN: CPT | Mod: S$GLB,,, | Performed by: NURSE PRACTITIONER

## 2025-03-23 RX ORDER — MUPIROCIN 20 MG/G
OINTMENT TOPICAL 3 TIMES DAILY
Qty: 22 G | Refills: 0 | Status: SHIPPED | OUTPATIENT
Start: 2025-03-23

## 2025-03-23 NOTE — PATIENT INSTRUCTIONS
- You must understand that you have received an Urgent Care treatment only and that you may be released before all of your medical problems are known or treated.   - You, the patient, will arrange for follow up care as instructed.   - If your condition worsens or fails to improve we recommend that you receive another evaluation at the ER immediately or contact your PCP to discuss your concerns.   - You can call [phone] or [phone] to help schedule an appointment with the appropriate provider.    Keep area clean and dry  Clean the area with soap and water and apply antibiotic ointment twice a day  Monitor the area for signs of infection: redness, swelling, increasing pain, yellow drainage, warmth and return to the clinic if you notice these signs  Follow up with Primary Care as needed

## 2025-03-23 NOTE — PROGRESS NOTES
"Subjective:      Patient ID: Kaylee Romero is a 80 y.o. female.    Vitals:  height is 5' 2" (1.575 m) and weight is 104.3 kg (230 lb). Her oral temperature is 98.1 °F (36.7 °C). Her blood pressure is 131/66 and her pulse is 77. Her respiration is 20 and oxygen saturation is 100%.     Chief Complaint: Wound Check    Pt is a 80 year old female presents in clinic with a wound located on left lower leg.  Patient states she did not realize she had wound until her family member told her 3 days ago.  Patient denies any pain, itching, drainage, swelling, or fever.  Patient's family member sent her in to get checked due to being a diabetic and to make sure the wound is not infected.      Wound Check  She was originally treated 2 to 3 days ago. Her temperature was unmeasured prior to arrival. There has been no drainage from the wound. There is no redness present. There is no swelling present. There is no pain present. She has no difficulty moving the affected extremity or digit.     ROS   Objective:     Physical Exam   Constitutional: She is oriented to person, place, and time.   HENT:   Head: Normocephalic.   Ears:   Right Ear: External ear normal.   Left Ear: External ear normal.   Nose: Nose normal.   Mouth/Throat: Mucous membranes are moist.   Eyes: Conjunctivae are normal.   Cardiovascular: Normal rate.   Pulmonary/Chest: Effort normal.   Musculoskeletal: Normal range of motion.         General: Normal range of motion.   Neurological: She is alert and oriented to person, place, and time.   Skin: Skin is dry.        Psychiatric: Her behavior is normal.   Nursing note and vitals reviewed.      Assessment:     1. Abrasion of left lower extremity, initial encounter        Plan:       Abrasion of left lower extremity, initial encounter  -     mupirocin (BACTROBAN) 2 % ointment; Apply topically 3 (three) times daily.  Dispense: 22 g; Refill: 0      Patient Instructions   - You must understand that you have received an " Urgent Care treatment only and that you may be released before all of your medical problems are known or treated.   - You, the patient, will arrange for follow up care as instructed.   - If your condition worsens or fails to improve we recommend that you receive another evaluation at the ER immediately or contact your PCP to discuss your concerns.   - You can call [phone] or [phone] to help schedule an appointment with the appropriate provider.    Keep area clean and dry  Clean the area with soap and water and apply antibiotic ointment twice a day  Monitor the area for signs of infection: redness, swelling, increasing pain, yellow drainage, warmth and return to the clinic if you notice these signs  Follow up with Primary Care as needed

## 2025-03-24 ENCOUNTER — TELEPHONE (OUTPATIENT)
Dept: NEPHROLOGY | Facility: CLINIC | Age: 80
End: 2025-03-24
Payer: MEDICARE

## 2025-03-24 DIAGNOSIS — Z00.00 ENCOUNTER FOR MEDICARE ANNUAL WELLNESS EXAM: ICD-10-CM

## 2025-03-24 NOTE — TELEPHONE ENCOUNTER
----- Message from Edith sent at 3/24/2025  1:30 PM CDT -----  Type:  Sooner Apoointment RequestCaller is requesting a sooner appointment.  Caller declined first available appointment listed below.  Caller will not accept being placed on the waitlist and is requesting a message be sent to doctor.Name of Caller:ptWhen is the first available appointment?juneSymptoms:kidney disease follow up Would the patient rather a call back or a response via [facility]? Call Best Call Back Number:[phone] or [phone] Information:

## 2025-03-26 ENCOUNTER — TELEPHONE (OUTPATIENT)
Dept: ENDOCRINOLOGY | Facility: CLINIC | Age: 80
End: 2025-03-26
Payer: MEDICARE

## 2025-03-26 NOTE — TELEPHONE ENCOUNTER
Spoke with patient wants to see you regarding swelling in arm. Informed patient that she needs to follow up with her pcp. Patient ask to send you this message.

## 2025-03-27 ENCOUNTER — OFFICE VISIT (OUTPATIENT)
Dept: PRIMARY CARE CLINIC | Facility: CLINIC | Age: 80
End: 2025-03-27
Payer: MEDICARE

## 2025-03-27 ENCOUNTER — TELEPHONE (OUTPATIENT)
Dept: PRIMARY CARE CLINIC | Facility: CLINIC | Age: 80
End: 2025-03-27
Payer: MEDICARE

## 2025-03-27 VITALS
HEART RATE: 74 BPM | OXYGEN SATURATION: 95 % | BODY MASS INDEX: 42.6 KG/M2 | DIASTOLIC BLOOD PRESSURE: 66 MMHG | TEMPERATURE: 98 F | WEIGHT: 231.5 LBS | SYSTOLIC BLOOD PRESSURE: 126 MMHG | HEIGHT: 62 IN

## 2025-03-27 DIAGNOSIS — R60.0 LOCALIZED EDEMA: ICD-10-CM

## 2025-03-27 DIAGNOSIS — M79.89 LEFT ARM SWELLING: Primary | ICD-10-CM

## 2025-03-27 PROCEDURE — 3288F FALL RISK ASSESSMENT DOCD: CPT | Mod: CPTII,S$GLB,, | Performed by: STUDENT IN AN ORGANIZED HEALTH CARE EDUCATION/TRAINING PROGRAM

## 2025-03-27 PROCEDURE — 99999 PR PBB SHADOW E&M-EST. PATIENT-LVL V: CPT | Mod: PBBFAC,,, | Performed by: STUDENT IN AN ORGANIZED HEALTH CARE EDUCATION/TRAINING PROGRAM

## 2025-03-27 PROCEDURE — 3072F LOW RISK FOR RETINOPATHY: CPT | Mod: CPTII,S$GLB,, | Performed by: STUDENT IN AN ORGANIZED HEALTH CARE EDUCATION/TRAINING PROGRAM

## 2025-03-27 PROCEDURE — 1159F MED LIST DOCD IN RCRD: CPT | Mod: CPTII,S$GLB,, | Performed by: STUDENT IN AN ORGANIZED HEALTH CARE EDUCATION/TRAINING PROGRAM

## 2025-03-27 PROCEDURE — 1101F PT FALLS ASSESS-DOCD LE1/YR: CPT | Mod: CPTII,S$GLB,, | Performed by: STUDENT IN AN ORGANIZED HEALTH CARE EDUCATION/TRAINING PROGRAM

## 2025-03-27 PROCEDURE — 3074F SYST BP LT 130 MM HG: CPT | Mod: CPTII,S$GLB,, | Performed by: STUDENT IN AN ORGANIZED HEALTH CARE EDUCATION/TRAINING PROGRAM

## 2025-03-27 PROCEDURE — 99214 OFFICE O/P EST MOD 30 MIN: CPT | Mod: S$GLB,,, | Performed by: STUDENT IN AN ORGANIZED HEALTH CARE EDUCATION/TRAINING PROGRAM

## 2025-03-27 PROCEDURE — 3078F DIAST BP <80 MM HG: CPT | Mod: CPTII,S$GLB,, | Performed by: STUDENT IN AN ORGANIZED HEALTH CARE EDUCATION/TRAINING PROGRAM

## 2025-03-27 PROCEDURE — 1125F AMNT PAIN NOTED PAIN PRSNT: CPT | Mod: CPTII,S$GLB,, | Performed by: STUDENT IN AN ORGANIZED HEALTH CARE EDUCATION/TRAINING PROGRAM

## 2025-03-27 NOTE — TELEPHONE ENCOUNTER
----- Message from Med Assistant Pam sent at 3/26/2025 12:07 PM CDT -----  Contact: [phone]  Would like to receive medical advice.Kaylee (pt)Symptoms (please be specific):  bruising and abnormal swelling in left armHow long has the patient had these symptoms: pt notice it on today and dont know how long the bruising and swelling has occur Any drug allergies (copy/paste from chart):   noPharmacy name/number (copy/paste from chart):  [id number] - [address] AT [address] [address]Vguet: [phone] Fax: [phone]: Not open 24 hoursWould they like a call back or a response via [facility]:  call backAdditional information:  n/a

## 2025-03-27 NOTE — PROGRESS NOTES
"Primary Care  Office Visit - In Person  3/27/2025      HPI    Patient is a 80 y.o.   Kaylee Romero  has a past medical history of Age-related osteoporosis without current pathological fracture (01/11/2019), Anemia, Cataract, Chronic kidney disease, stage III (moderate) (04/20/2017), CKD (chronic kidney disease) stage 3, GFR 30-59 ml/min, DDD (degenerative disc disease), cervical (03/27/2024), Dry mouth, History of TIA (transient ischemic attack) (12/11/2018), Hyperlipidemia (12/02/2014), Hypertension, Morbid obesity with body mass index (BMI) of 40.0 or higher (05/09/2016), KAMRAN (obstructive sleep apnea), KAMRAN on CPAP (06/28/2016), Osteoporosis without current pathological fracture (11/11/2018), Physical deconditioning (11/05/2018), 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    History of Present Illness    CHIEF COMPLAINT:  Patient presents today with concerns about bruising and swelling     She reports multiple areas of bruising appearing throughout her body. She also notes a lump on her left arm. She denies any pain. She currently takes ASA and plavix. She denies any recent blood draws.           Active Medications:  Current Outpatient Medications   Medication Instructions    acetaminophen (TYLENOL) 500 mg, 2 times daily PRN    amLODIPine (NORVASC) 10 mg, Oral    aspirin (ECOTRIN) 81 mg, Oral, 2 times daily    atorvastatin (LIPITOR) 80 mg, Oral, Daily    BD BOO 2ND GEN PEN NEEDLE 32 gauge x 5/32" Ndle To injection daily    blood sugar diagnostic Strp 1 strip, Misc.(Non-Drug; Combo Route), Daily    blood-glucose meter,continuous (DEXCOM G7 ) Misc To use with sensor    blood-glucose sensor (DEXCOM G7 SENSOR) Areli To change every 10 days    busPIRone (BUSPAR) 5 mg, Oral, 2 times daily, For Anxiety.    carvediloL (COREG) 25 mg, Oral, 2 times daily    clopidogreL " "(PLAVIX) 75 mg, Oral, Daily    diphenoxylate-atropine 2.5-0.025 mg (LOMOTIL) 2.5-0.025 mg per tablet 1 tablet, Oral, 4 times daily PRN, for diarrhea    FEROSUL 325 mg (65 mg iron) Tab tablet TAKE 1 TABLET BY MOUTH DAILY    furosemide (LASIX) 20 MG tablet TAKE 1 TABLET BY MOUTH ON SATURDAY, SUNDAY, MONDAY, WEDNESDAY, FRIDAY    gabapentin (NEURONTIN) 100-200 mg, Oral, Nightly    glipiZIDE (GLUCOTROL) 5 MG tablet TAKE 2 TABLETS(10 MG) BY MOUTH TWICE DAILY WITH MEALS    glucagon (GVOKE HYPOPEN 2-PACK) 1 mg/0.2 mL AtIn 1 Package, Subcutaneous, As needed (PRN)    irbesartan (AVAPRO) 300 mg, Oral, Nightly    isosorbide mononitrate (IMDUR) 60 mg, Oral, Daily    LIDOcaine (LIDODERM) 5 % 1 patch, Transdermal, Daily, Remove & Discard patch within 12 hours or as directed by MD    moxifloxacin (VIGAMOX) 0.5 % ophthalmic solution 1 drop, 4 times daily    multivitamin (THERAGRAN) per tablet 1 tablet, Daily    mupirocin (BACTROBAN) 2 % ointment Topical (Top), 3 times daily    nitroGLYCERIN (NITROSTAT) 0.4 mg, Sublingual, Every 5 min PRN    prednisoLONE acetate (PRED FORTE) 1 % DrpS 1 drop, 4 times daily    TRULICITY 4.5 mg, Subcutaneous, Every 7 days    vitamin D (VITAMIN D3) 1,000 Units, Twice weekly       Vitals:    03/27/25 1312   BP: 126/66   BP Location: Right arm   Patient Position: Sitting   Pulse: 74   Temp: 98 °F (36.7 °C)   SpO2: 95%   Weight: 105 kg (231 lb 7.7 oz)   Height: 5' 2" (1.575 m)       Physical Exam  Musculoskeletal:         General: Swelling present.   Skin:     Findings: Ecchymosis present.            Assessment & Plan        ECCHYMOSES:  Explained that long-term use of DAPT can lead to increased bruising over time    LOCALIZED SWELLING AND MASS IN L UPPER EXTREMITY:  R/o DVT   Ecchymoses and swelling are at different sites on her upper extremity        Orders Placed This Encounter    US Upper Extremity Veins Left         Previous labs, records, and notes reviewed and considered for their impact on clinical " decision making today.                Upcoming Scheduled Appointments and Follow Up:    Future Appointments   Date Time Provider Department Center   4/24/2025  1:30 PM [facility]   4/29/2025  2:15 PM Donna Gillis DPM [facility] PODIATR [facility]   6/17/2025 12:30 PM LAB, [facility] LTR LAB [facility]   6/17/2025  1:40 PM NOMMIGUEL, DEXA1 NOMC BMD [facility]   6/24/2025 10:30 AM Abbey Fox, NP NOM ENDODIA [facility]   6/30/2025  4:00 PM Chinyere Cuello MD [facility] NEPHRO [facility]   7/24/2025  2:00 PM [facility] Roberts [facility]       Follow Up [facility] (with who? when?): No follow-ups on file.      Extended Emergency Contact Information  Primary Emergency Contact: Teddy Romero  Address: [id number] [place] [address] [id number] [place] [address]  Home Phone: [phone]  Mobile Phone: [phone]  Relation: Spouse  Secondary Emergency Contact: Tobias Romero  Address: [address] [place] [address]  Mobile Phone: [phone]  Relation: Elliott Rand MD   Internal Medicine  3/27/2025 - 1:33 PM    I spent a total of 15 minutes on the day of the visit.This includes face to face time and non-face to face time preparing to see the patient (eg, review of tests), obtaining and/or reviewing separately obtained history, documenting clinical information in the electronic or other health record, independently interpreting results and communicating results to the patient/family/caregiver, or care coordinator.    This note was generated with the assistance of ambient listening technology. Verbal consent was obtained by the patient and accompanying visitor(s) for the recording of patient appointment to facilitate this note. I attest to having reviewed and edited the generated note for accuracy, though some syntax or spelling errors may persist. Please contact the author of this  note for any clarification.      While patients have the right to access their medical record, it is essential to recognize that progress notes primarily serve as a means of communication among healthcare professionals.

## 2025-03-28 ENCOUNTER — TELEPHONE (OUTPATIENT)
Dept: CARDIOLOGY | Facility: CLINIC | Age: 80
End: 2025-03-28
Payer: MEDICARE

## 2025-03-28 ENCOUNTER — PATIENT MESSAGE (OUTPATIENT)
Dept: ENDOCRINOLOGY | Facility: CLINIC | Age: 80
End: 2025-03-28
Payer: MEDICARE

## 2025-03-28 NOTE — TELEPHONE ENCOUNTER
Pt on ASA Plavix for a while. Recently she started having multiple bruises. She currently has a large bruise, fist size, to the underside of  her upper lt arm and a lump/ mass underneath the lt elbow. She also has 3 bruises under her upper right arm. She denies any trauma to the area.   She went to see primary care yesterday who ordered a venous u/s lt arm scheduled for 4/1. I scheduled her w. Ms Lin on 4/2 (did not want to see another PA) to address and cancelled her upcoming appt on 4/21. She agreed to date/time of appointment(s). Told her to arrive 30 mn early and she verbalized understanding.

## 2025-03-28 NOTE — TELEPHONE ENCOUNTER
----- Message from Debbie sent at 3/28/2025 12:26 PM CDT -----  Regarding: Lump  Patient think that he medication clopidogreL (PLAVIX) 75 mg tablet & aspirin are causing lumps and bruised. Please call back @ [phone]. Thank you Debbie

## 2025-04-01 ENCOUNTER — HOSPITAL ENCOUNTER (OUTPATIENT)
Dept: RADIOLOGY | Facility: HOSPITAL | Age: 80
Discharge: HOME | End: 2025-04-01
Attending: STUDENT IN AN ORGANIZED HEALTH CARE EDUCATION/TRAINING PROGRAM
Payer: MEDICARE

## 2025-04-01 ENCOUNTER — TELEPHONE (OUTPATIENT)
Dept: CARDIOLOGY | Facility: CLINIC | Age: 80
End: 2025-04-01
Payer: MEDICARE

## 2025-04-01 DIAGNOSIS — R60.0 LOCALIZED EDEMA: ICD-10-CM

## 2025-04-01 DIAGNOSIS — M79.89 LEFT ARM SWELLING: ICD-10-CM

## 2025-04-01 PROCEDURE — 93971 EXTREMITY STUDY: CPT | Mod: TC,LT

## 2025-04-01 PROCEDURE — 93971 EXTREMITY STUDY: CPT | Mod: 26,LT,, | Performed by: RADIOLOGY

## 2025-04-02 ENCOUNTER — RESULTS FOLLOW-UP (OUTPATIENT)
Dept: PRIMARY CARE CLINIC | Facility: CLINIC | Age: 80
End: 2025-04-02

## 2025-04-02 ENCOUNTER — APPOINTMENT (OUTPATIENT)
Dept: LAB | Facility: HOSPITAL | Age: 80
End: 2025-04-02
Payer: MEDICARE

## 2025-04-02 ENCOUNTER — OFFICE VISIT (OUTPATIENT)
Dept: CARDIOLOGY | Facility: CLINIC | Age: 80
End: 2025-04-02
Payer: MEDICARE

## 2025-04-02 VITALS
HEART RATE: 79 BPM | HEIGHT: 62 IN | OXYGEN SATURATION: 99 % | DIASTOLIC BLOOD PRESSURE: 71 MMHG | BODY MASS INDEX: 42.34 KG/M2 | SYSTOLIC BLOOD PRESSURE: 114 MMHG

## 2025-04-02 DIAGNOSIS — E66.01 CLASS 3 SEVERE OBESITY DUE TO EXCESS CALORIES WITH SERIOUS COMORBIDITY AND BODY MASS INDEX (BMI) OF 40.0 TO 44.9 IN ADULT: ICD-10-CM

## 2025-04-02 DIAGNOSIS — E78.5 HYPERLIPIDEMIA, UNSPECIFIED HYPERLIPIDEMIA TYPE: ICD-10-CM

## 2025-04-02 DIAGNOSIS — G47.33 OSA ON CPAP: ICD-10-CM

## 2025-04-02 DIAGNOSIS — N18.31 CHRONIC KIDNEY DISEASE, STAGE 3A: ICD-10-CM

## 2025-04-02 DIAGNOSIS — I50.32 CHRONIC HEART FAILURE WITH PRESERVED EJECTION FRACTION: ICD-10-CM

## 2025-04-02 DIAGNOSIS — I35.0 NONRHEUMATIC AORTIC VALVE STENOSIS: ICD-10-CM

## 2025-04-02 DIAGNOSIS — I10 ESSENTIAL HYPERTENSION: ICD-10-CM

## 2025-04-02 DIAGNOSIS — T14.8XXA BRUISING: ICD-10-CM

## 2025-04-02 DIAGNOSIS — Z95.1 S/P CABG (CORONARY ARTERY BYPASS GRAFT): ICD-10-CM

## 2025-04-02 DIAGNOSIS — E11.22 TYPE 2 DIABETES MELLITUS WITH STAGE 3A CHRONIC KIDNEY DISEASE, WITHOUT LONG-TERM CURRENT USE OF INSULIN: ICD-10-CM

## 2025-04-02 DIAGNOSIS — N18.31 TYPE 2 DIABETES MELLITUS WITH STAGE 3A CHRONIC KIDNEY DISEASE, WITHOUT LONG-TERM CURRENT USE OF INSULIN: ICD-10-CM

## 2025-04-02 DIAGNOSIS — I25.118 CORONARY ARTERY DISEASE OF NATIVE ARTERY OF NATIVE HEART WITH STABLE ANGINA PECTORIS: Primary | ICD-10-CM

## 2025-04-02 DIAGNOSIS — E66.813 CLASS 3 SEVERE OBESITY DUE TO EXCESS CALORIES WITH SERIOUS COMORBIDITY AND BODY MASS INDEX (BMI) OF 40.0 TO 44.9 IN ADULT: ICD-10-CM

## 2025-04-02 PROCEDURE — 1159F MED LIST DOCD IN RCRD: CPT | Mod: CPTII,S$GLB,, | Performed by: PHYSICIAN ASSISTANT

## 2025-04-02 PROCEDURE — 99214 OFFICE O/P EST MOD 30 MIN: CPT | Mod: S$GLB,,, | Performed by: PHYSICIAN ASSISTANT

## 2025-04-02 PROCEDURE — 1101F PT FALLS ASSESS-DOCD LE1/YR: CPT | Mod: CPTII,S$GLB,, | Performed by: PHYSICIAN ASSISTANT

## 2025-04-02 PROCEDURE — 3078F DIAST BP <80 MM HG: CPT | Mod: CPTII,S$GLB,, | Performed by: PHYSICIAN ASSISTANT

## 2025-04-02 PROCEDURE — 1126F AMNT PAIN NOTED NONE PRSNT: CPT | Mod: CPTII,S$GLB,, | Performed by: PHYSICIAN ASSISTANT

## 2025-04-02 PROCEDURE — 3288F FALL RISK ASSESSMENT DOCD: CPT | Mod: CPTII,S$GLB,, | Performed by: PHYSICIAN ASSISTANT

## 2025-04-02 PROCEDURE — 99999 PR PBB SHADOW E&M-EST. PATIENT-LVL V: CPT | Mod: PBBFAC,,, | Performed by: PHYSICIAN ASSISTANT

## 2025-04-02 PROCEDURE — 3072F LOW RISK FOR RETINOPATHY: CPT | Mod: CPTII,S$GLB,, | Performed by: PHYSICIAN ASSISTANT

## 2025-04-02 PROCEDURE — 3074F SYST BP LT 130 MM HG: CPT | Mod: CPTII,S$GLB,, | Performed by: PHYSICIAN ASSISTANT

## 2025-04-02 NOTE — PROGRESS NOTES
Cardiology Clinic Note  Reason for Visit: Bruising  General Cardiologist: Dr. Lee     HPI:     Kaylee Romero is a 80 y.o. female, who presents with concern for bruising.     For the past 1-2 months she has been noticing excessive bruising without prior injury on upper and lower extremities.   She saw her PCP yesterday who ordered an LUE ultrasound. No DVT was identified, and area of bruising at the elbow superficial hematoma or lipoma.     She is on DAPT. Last PCI in 2019.     She has no other symptoms to report at this time. Chronic shortness of breath is unchanged. No leg swelling, chest pain or palpitations.     Dr. Lee HPI 10/30/2024  Her anginal symptoms include chest pressure in left upper chest.    No recurrence of angina recently.   Last SL nitro use was 6/2024.    BP has been well controlled.  Minimal salt intake.  Not very active. Has a floor bike. Uses this 60 minutes/night.  Balance prevents more exertional activity. Last year, had three falls in six weeks.  Was using a walker/cane for ambulation. Now walking outside without assistance device.    Has cataract surgery next week.  She will be holding trulicity, plavix.    Was short of breath walking from parking garage to clinic.  Takes lasix 20mg 4x/week. No edema, orthopnea, PND.   Home scale 235-238lb.    Medical: CAD s/p CABG (8/12/19 - LIMA-LAD, SVG-OM1) with subsequent 90% stenosis distal LIMA graft s/p PCI of LCX/RCA lesions (10/2019), HTN, HLD, DM2, CKD 3, mild AS, KAMRAN, HFpEF, TIA  Other relevant histories: None    ROS:    Pertinent ROS included in HPI and below.  PMH:     Patient Active Problem List   Diagnosis    Essential hypertension    Class 3 severe obesity due to excess calories with serious comorbidity and body mass index (BMI) of 40.0 to 44.9 in adult    KAMRAN on CPAP    Chronic kidney disease, stage III (moderate)    History of total knee arthroplasty, left    Osteoporosis without current pathological fracture    History  of TIA (transient ischemic attack)    Age-related osteoporosis without current pathological fracture    Vitamin D deficiency, unspecified    Type 2 diabetes mellitus with stage 3 chronic kidney disease, without long-term current use of insulin    Coronary 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 pectoris    S/P CABG (coronary artery bypass graft)    Shoulder pain    Chronic heart failure with preserved ejection fraction    Decreased range of motion of right knee    Gait, antalgic    Purpura    Degenerative cervical spinal stenosis    Cervical myelopathy    DDD (degenerative disc disease), cervical    Status post total knee replacement, right    Hyperlipidemia    Nonrheumatic aortic valve stenosis    Nuclear sclerotic cataract of left eye     Kaylee  has a past surgical history that includes  section; ankle surgery (l) (Left); Appendectomy; Knee arthroscopy w/ debridement; Dilation and curettage of uterus; Knee surgery (Left, 2017); Left heart catheterization (Left, 2019); Coronary Artery Bypass Graft (CABG) (N/A, 2019); Endoscopic harvest of vein (Left, 2019); Coronary artery bypass graft (2019); Left heart catheterization (Left, 10/18/2021); Coronary bypass graft angiography (10/18/2021); Joint replacement (Left); Total knee arthroplasty (Right, 2023); Transforaminal epidural injection of steroid (Left, 11/15/2023); Phacoemulsification of cataract (Right, 2024); Intraocular prosthesis insertion (Right, 2024); Cataract extraction w/  intraocular lens implant (Right, 2024); and phacoemulsification, cataract, with iol insertion (Left, 2025).  Allergies:     Review of patient's allergies indicates:   Allergen Reactions    Bactrim [sulfamethoxazole-trimethoprim]      Generic version  Sulfa makes her sick    Keflex [cephalexin]      Turns orange     Medications:     Current Outpatient Medications   Medication Instructions     "acetaminophen (TYLENOL) 500 mg, 2 times daily PRN    amLODIPine (NORVASC) 10 mg, Oral    aspirin (ECOTRIN) 81 mg, Oral, 2 times daily    atorvastatin (LIPITOR) 80 mg, Oral, Daily    BD BOO 2ND GEN PEN NEEDLE 32 gauge x 5/32" Ndle To injection daily    blood sugar diagnostic Strp 1 strip, Misc.(Non-Drug; Combo Route), Daily    blood-glucose meter,continuous (DEXCOM G7 ) Misc To use with sensor    blood-glucose sensor (DEXCOM G7 SENSOR) Areli To change every 10 days    busPIRone (BUSPAR) 5 mg, Oral, 2 times daily, For Anxiety.    carvediloL (COREG) 25 mg, Oral, 2 times daily    clopidogreL (PLAVIX) 75 mg, Oral, Daily    diphenoxylate-atropine 2.5-0.025 mg (LOMOTIL) 2.5-0.025 mg per tablet 1 tablet, Oral, 4 times daily PRN, for diarrhea    FEROSUL 325 mg (65 mg iron) Tab tablet TAKE 1 TABLET BY MOUTH DAILY    furosemide (LASIX) 20 MG tablet TAKE 1 TABLET BY MOUTH ON SATURDAY, SUNDAY, MONDAY, WEDNESDAY, FRIDAY    gabapentin (NEURONTIN) 100-200 mg, Oral, Nightly    glipiZIDE (GLUCOTROL) 5 MG tablet TAKE 2 TABLETS(10 MG) BY MOUTH TWICE DAILY WITH MEALS    glucagon (GVOKE HYPOPEN 2-PACK) 1 mg/0.2 mL AtIn 1 Package, Subcutaneous, As needed (PRN)    irbesartan (AVAPRO) 300 mg, Oral, Nightly    isosorbide mononitrate (IMDUR) 60 mg, Oral, Daily    LIDOcaine (LIDODERM) 5 % 1 patch, Transdermal, Daily, Remove & Discard patch within 12 hours or as directed by MD    moxifloxacin (VIGAMOX) 0.5 % ophthalmic solution 1 drop, 4 times daily    multivitamin (THERAGRAN) per tablet 1 tablet, Daily    mupirocin (BACTROBAN) 2 % ointment Topical (Top), 3 times daily    nitroGLYCERIN (NITROSTAT) 0.4 mg, Sublingual, Every 5 min PRN    prednisoLONE acetate (PRED FORTE) 1 % DrpS 1 drop, 4 times daily    TRULICITY 4.5 mg, Subcutaneous, Every 7 days    vitamin D (VITAMIN D3) 1,000 Units, Twice weekly     Social History:     Social History     Tobacco Use    Smoking status: Never     Passive exposure: Never    Smokeless tobacco: Never "   Substance Use Topics    Alcohol use: Yes     Alcohol/week: 1.0 standard drink of alcohol     Types: 1 Shots of liquor per week     Comment: rare     Physical Exam:     BP Readings from Last 3 Encounters:   04/02/25 114/71   03/27/25 126/66   03/23/25 131/66      Pulse Readings from Last 3 Encounters:   04/02/25 79   03/27/25 74   03/23/25 77      Wt Readings from Last 3 Encounters:   03/27/25 1312 105 kg (231 lb 7.7 oz)   03/23/25 1738 104.3 kg (230 lb)   02/26/25 0839 104.3 kg (230 lb)       Constitutional: No apparent distress, conversant  Neck: No jugular venous distension, no carotid bruits  CV: Regular rate and rhythm, 2/6 systolic murmur at RUSB  Pulm: Clear to auscultation bilaterally  Extremities: No lower extremity edema, warm with palpable pulses  Skin: Bruising present     Labs:     Blood Tests:  Lab Results   Component Value Date     (H) 06/20/2022     01/24/2025    K 4.6 01/24/2025     01/24/2025    CO2 24 01/24/2025    BUN 21 01/24/2025    CREATININE 1.2 01/24/2025     (H) 01/24/2025    HGBA1C 6.7 (H) 01/24/2025    MG 1.8 06/21/2022    AST 23 01/24/2025    ALT 18 01/24/2025    ALBUMIN 3.5 01/24/2025    PROT 8.5 (H) 01/24/2025    BILITOT 0.4 01/24/2025    WBC 6.78 11/20/2024    HGB 11.1 (L) 11/20/2024    HCT 35 (L) 11/20/2024    HCT 33.6 (L) 11/20/2024    MCV 85 11/20/2024     11/20/2024    INR 1.1 02/16/2023    TSH 2.796 07/11/2024       Lab Results   Component Value Date    CHOL 90 (L) 08/09/2024    HDL 31 (L) 08/09/2024    TRIG 94 08/09/2024       Lab Results   Component Value Date    LDLCALC 40.2 (L) 08/09/2024       Urine Tests:  Lab Results   Component Value Date    COLORU Colorless (A) 01/24/2025    APPEARANCEUA Clear 01/24/2025    PHUR 7.0 01/24/2025    SPECGRAV 1.010 01/24/2025    PROTEINUA Negative 01/24/2025    GLUCUA Negative 01/24/2025    KETONESU Negative 01/24/2025    BILIRUBINUA Negative 01/24/2025    OCCULTUA Negative 01/24/2025    NITRITE Negative  01/24/2025    UROBILINOGEN normal 04/20/2018    UROBILINOGEN Negative 04/20/2017    LEUKOCYTESUR Negative 01/24/2025    PROTEINURINE <7 11/11/2024    CREATRANDUR 18.0 11/11/2024    UTPCR Unable to calculate 11/11/2024       Imaging:     Echocardiogram/Echo Stress Testing  Results for orders placed during the hospital encounter of 07/22/22  Interpretation Summary  · The left ventricle is normal in size with concentric remodeling and normal systolic function.  · The estimated ejection fraction is 68%.  · Indeterminate left ventricular diastolic function.  · Normal right ventricular size with normal right ventricular systolic function.  · There is mild aortic valve stenosis.  · Aortic valve area is 2.18 cm2; peak velocity is 2.04 m/s; mean gradient is 11 mmHg.  · Intermediate central venous pressure (8 mmHg).  · The estimated PA systolic pressure is 27 mmHg.    Nuclear stress testing  PET 8/2/24    There are two significant perfusion abnormalities.    Perfusion Abnormality #1 - There is a very small (< 5%) sized, mild intensity mid anterior reversible perfusion abnormality involving 3% of LV myocardium in the distribution of the D1 indicative of focal coronary stenosis.    Perfusion Abnormality #2 - There is a very small (<5%) sized, mild intensity distal inferolateral reversible perfusion abnormality involving 3% of LV myocardium in the distribution of the LCX territory.    There are no other significant perfusion abnormalities.    The whole heart absolute myocardial perfusion values averaged 0.71 cc/min/g at rest, which is normal; 0.94 cc/min/g at stress, which is moderately reduced; and CFR is 1.33, which is moderately reduced.    CT attenuation images demonstrate moderate diffuse coronary calcifications in the LAD, LCX, RCA and D1 territory and mild diffuse aortic calcifications in the descending aorta.    The gated perfusion images showed an ejection fraction of 79% at rest and 72% during stress. A normal ejection  fraction is greater than 47%.    The resting wall motion is normal at rest and at post-stress.    The LV cavity size is normal rest and normal during post-stress.    The ECG portion of the study is negative for ischemia.    There were no arrhythmias during stress.    The patient reported chest pain during the stress test.    When compared to a prior study from 7/18/2022, there are no significant changes.    PET 7/18/22    There is a very small (< 5%) sized, mid anterior reversible perfusion abnormality involving 3% of LV myocardium in the distribution of the D1.    There are no other significant perfusion abnormalities.    The whole heart absolute myocardial perfusion values averaged 0.61 cc/min/g at rest, which is normal; 0.81 cc/min/g at stress, which is severely reduced; and CFR is 1.34 , which is moderately reduced.    The stress flow is severely reduced diffusely throughout the heart consistent with non- response to vasodilator stress or caffeine.    CT attenuation images demonstrate severe diffuse coronary calcifications in the LAD territory and mild diffuse aortic calcifications.    The gated perfusion images showed an ejection fraction of 77% at rest and 66% during stress. A normal ejection fraction is greater than 53%.    The wall motion is normal at rest.    There is mid anterior wall hypokinesis during stress.    The EKG portion of this study is negative for ischemia.    There were no arrhythmias during stress.    The patient reported chest pain during the stress test.    When compared to the previous study from 9/24/2021, there are no significant changes in the perfusion pattern taking into account different pharmacologic stress agents.    PET 9/24/21    There is a small to moderate sized, mild to moderate intensity mid anterolateral reversible perfusion abnormality involving 13% of the LV myocardium in the distribution of the D1 indicative of focal coronary stenosis.    Within the perfusion abnormality,  absolute myocardial perfusion (cc/min/gm) averaged 0.81 cc/min/g at rest, 0.82 cc/min/g at stress and CFR was 1.02 , which equates to severely reduced coronary flow capacity within the D1 territory.    There is a small to moderate sized, mild to moderate intensity distal inferolateral resting perfusion abnormality involving 9% of LV myocardium in the distribution of the  LCX territory. After pharmacologic stress, this defect is larger and more severe involving 11% of the LV myocardium indicative of infarct with christin-infarct ischemia.    Within the perfusion abnormality, absolute myocardial perfusion (cc/min/gm) averaged 0.57 cc/min/g at rest, 0.81 cc/min/g at stress and CFR was 1.39 , which equates to severely reduced coronary flow capacity within the LCX territory.    The whole heart absolute myocardial perfusion values averaged 0.85 cc/min/g at rest, which is normal; 1.38 cc/min/g at stress, which is mildly reduced; and , which is severely reduced.    CT attenuation images demonstrate moderate diffuse coronary calcifications in the LAD and RCA territory.    The gated perfusion images showed an ejection fraction of 76% at rest and 68% during stress. A normal ejection fraction is greater than 53%.    The wall motion is normal at rest.    There is anterolateral wall hypokinesis during stress.    The LV cavity size is normal at rest and stress.    The EKG portion of this study is negative for ischemia.    During stress, rare PVCs are noted.    The patient reported chest pain during the stress test.    When compared to the previous study from 7/15/2020, the perfusion defects are slightly larger and more severe. Coronary flow capacity has decreased in the anterolateral wall defect.    Cath Lab  [facility] 10/18/21  The Mid LAD lesion was 90% stenosed.  The estimated blood loss was none.  The 1st Mrg lesion was 100% stenosed.  Consider other etiologies of smx.  Patent LIMA to LAD and SVG to OMBl    Other  CTS 8/12/19  PROCEDURE  PERFORMED:  Coronary artery bypass grafting x2, left internal mammary artery to left anterior descending artery, saphenous vein graft to obtuse marginal.    CT abd/pelv 10/21/12  The abdominal aorta is normal in course and caliber without significant atherosclerotic calcifications.     EKG:   EKG (8/2/24): normal sinus rhythm, 1st degree AV block. (personally reviewed)    Assessment:     1. Coronary 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 pectoris    2. Nonrheumatic aortic valve stenosis    3. Bruising    4. S/P CABG (coronary artery bypass graft)    5. Hyperlipidemia, unspecified hyperlipidemia type    6. Chronic heart failure with preserved ejection fraction    7. Essential hypertension    8. Type 2 diabetes mellitus with stage 3a chronic kidney disease, without long-term current use of insulin    9. Class 3 severe obesity due to excess calories with serious comorbidity and body mass index (BMI) of 40.0 to 44.9 in adult    10. KAMRAN on CPAP    11. Chronic kidney disease, stage 3a        Plan:     Bruising   Check CBC  Ok at this point to d/c plavix    Coronary 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 pectoris  S/p CABG  Hyperlipidemia, unspecified hyperlipidemia type  No angina  Continue ASA, high intensity statin, BB, ARB, imdur  LDL at goal on current meds    High risk ASCVD. LDL goal <70   Major criteria: no MI/PAD/CVA   Minor criteria: age >66yo, HTN, DM, HF, h/o CABG    Chronic heart failure with preserved ejection fraction  Euvolemic, stable  Discussed SGLT2i. Has some hesitation due to recent proteinuria side effect from a medication  Continue lasix 4x/week  Low salt    Hypertension  BP at goal  Continue medications  Low salt    Mild aortic stenosis  Echo every 3-5 yrs  Surveillance echo ordered today     Type 2 diabetes mellitus with stage 3a chronic kidney disease, without long-term current use of insulin  A1c at goal <7%    Class 3 severe obesity due to  excess calories with serious comorbidity and body mass index (BMI) of 40.0 to 44.9 in adult  Diet, exercise, weight loss    KAMRAN  CPAP    Stage 3 chronic kidney disease  Baseline Cr 1.2-1.5        Signed:  Elvia Lin PA-C  Cardiology     Follow-up:     Future Appointments   Date Time Provider Department Center   4/9/2025  5:30 PM ECHO, [facility] ECHOSTR [facility]   4/24/2025  1:30 PM Sylvia Dela Cruz MD [facility] OPHTHAL [facility]   4/29/2025  2:15 PM Donna Gillis DPM [facility]   [facility]   6/17/2025  1:40 PM DIANE, DEXA1 [facility] BMD [facility]   6/24/2025 10:30 AM Abbey Fox NP [facility] ENDODIA [facility]   6/30/2025  4:00 PM Chinyere Cuello MD [facility] NEPHRO [facility]   7/24/2025  2:00 PM Liz Roberts MD [facility] PRICARE [facility]

## 2025-04-03 ENCOUNTER — RESULTS FOLLOW-UP (OUTPATIENT)
Dept: CARDIOLOGY | Facility: CLINIC | Age: 80
End: 2025-04-03

## 2025-04-03 ENCOUNTER — RESULTS FOLLOW-UP (OUTPATIENT)
Dept: ENDOCRINOLOGY | Facility: CLINIC | Age: 80
End: 2025-04-03

## 2025-04-03 ENCOUNTER — PATIENT MESSAGE (OUTPATIENT)
Dept: PRIMARY CARE CLINIC | Facility: CLINIC | Age: 80
End: 2025-04-03
Payer: MEDICARE

## 2025-04-09 ENCOUNTER — HOSPITAL ENCOUNTER (OUTPATIENT)
Dept: CARDIOLOGY | Facility: HOSPITAL | Age: 80
Discharge: HOME | End: 2025-04-09
Attending: PHYSICIAN ASSISTANT
Payer: MEDICARE

## 2025-04-09 VITALS
HEIGHT: 62 IN | BODY MASS INDEX: 42.51 KG/M2 | DIASTOLIC BLOOD PRESSURE: 74 MMHG | HEART RATE: 75 BPM | WEIGHT: 231 LBS | SYSTOLIC BLOOD PRESSURE: 122 MMHG

## 2025-04-09 DIAGNOSIS — I35.0 NONRHEUMATIC AORTIC VALVE STENOSIS: ICD-10-CM

## 2025-04-09 PROCEDURE — 93306 TTE W/DOPPLER COMPLETE: CPT

## 2025-04-10 ENCOUNTER — RESULTS FOLLOW-UP (OUTPATIENT)
Dept: CARDIOLOGY | Facility: CLINIC | Age: 80
End: 2025-04-10

## 2025-04-10 DIAGNOSIS — I48.91 NEW ONSET A-FIB: Primary | ICD-10-CM

## 2025-04-10 LAB
ASCENDING AORTA: 2.44 CM
AV AREA BY CONTINUOUS VTI: 1.9 CM2
AV INDEX (PROSTH): 0.57
AV LVOT MEAN GRADIENT: 4 MMHG
AV LVOT PEAK GRADIENT: 7 MMHG
AV MEAN GRADIENT: 13 MMHG
AV PEAK GRADIENT: 23 MMHG
AV VALVE AREA BY VELOCITY RATIO: 1.7 CM²
AV VALVE AREA: 1.8 CM²
AV VELOCITY RATIO: 0.54
BSA FOR ECHO PROCEDURE: 2.14 M2
CV ECHO LV RWT: 0.44 CM
DOP CALC AO PEAK VEL: 2.4 M/S
DOP CALC AO VTI: 52.7 CM
DOP CALC LVOT AREA: 3.1 CM2
DOP CALC LVOT DIAMETER: 2 CM
DOP CALC LVOT PEAK VEL: 1.3 M/S
DOP CALC LVOT STROKE VOLUME: 94.5 CM3
DOP CALCLVOT PEAK VEL VTI: 30.1 CM
E WAVE DECELERATION TIME: 181 MS
E/A RATIO: 1.52
E/E' RATIO: 18 M/S
ECHO EF ESTIMATED: 64 %
ECHO LV POSTERIOR WALL: 0.9 CM (ref 0.6–1.1)
FRACTIONAL SHORTENING: 34.1 % (ref 28–44)
INTERVENTRICULAR SEPTUM: 1.1 CM (ref 0.6–1.1)
IVC DIAMETER: 1.53 CM
LA MAJOR: 6 CM
LA MINOR: 6 CM
LA WIDTH: 3.8 CM
LEFT ATRIUM SIZE: 5 CM
LEFT ATRIUM VOLUME INDEX MOD: 29 ML/M2
LEFT ATRIUM VOLUME INDEX: 48 ML/M2
LEFT ATRIUM VOLUME MOD: 58 ML
LEFT ATRIUM VOLUME: 97 CM3
LEFT INTERNAL DIMENSION IN SYSTOLE: 2.7 CM (ref 2.1–4)
LEFT VENTRICLE DIASTOLIC VOLUME INDEX: 36.45 ML/M2
LEFT VENTRICLE DIASTOLIC VOLUME: 74 ML
LEFT VENTRICLE MASS INDEX: 65.1 G/M2
LEFT VENTRICLE SYSTOLIC VOLUME INDEX: 12.8 ML/M2
LEFT VENTRICLE SYSTOLIC VOLUME: 26 ML
LEFT VENTRICULAR INTERNAL DIMENSION IN DIASTOLE: 4.1 CM (ref 3.5–6)
LEFT VENTRICULAR MASS: 132.1 G
LV LATERAL E/E' RATIO: 17.9
LV SEPTAL E/E' RATIO: 17.9
MV PEAK A VEL: 0.94 M/S
MV PEAK E VEL: 1.43 M/S
OHS CV RV/LV RATIO: 1.1 CM
PISA TR MAX VEL: 2.4 M/S
RA MAJOR: 4.81 CM
RA PRESSURE ESTIMATED: 8 MMHG
RA WIDTH: 4.15 CM
RIGHT ATRIAL AREA: 16.6 CM2
RIGHT VENTRICLE DIASTOLIC BASEL DIMENSION: 4.5 CM
RV TB RVSP: 10 MMHG
RV TISSUE DOPPLER FREE WALL SYSTOLIC VELOCITY 1 (APICAL 4 CHAMBER VIEW): 7.54 CM/S
SINUS: 2.69 CM
STJ: 2.38 CM
TDI LATERAL: 0.08 M/S
TDI SEPTAL: 0.08 M/S
TDI: 0.08 M/S
TRICUSPID ANNULAR PLANE SYSTOLIC EXCURSION: 1.81 CM
TV PEAK GRADIENT: 23 MMHG
TV REST PULMONARY ARTERY PRESSURE: 31 MMHG
Z-SCORE OF LEFT VENTRICULAR DIMENSION IN END DIASTOLE: -3.83
Z-SCORE OF LEFT VENTRICULAR DIMENSION IN END SYSTOLE: -2.46

## 2025-04-10 NOTE — PROGRESS NOTES
Pt updated on afib, teaching done on why risk of stroke, why Eliquis, go to ER if hits head, watch out for bleeding, cost of Eliquis, call us back for symptoms. She verbalized understanding. She was scheduled in Am for holter and 4/22 for f/u. She agreed to date/time of appointment(s). Reminder mailed to pt.

## 2025-04-11 ENCOUNTER — CLINICAL SUPPORT (OUTPATIENT)
Dept: CARDIOLOGY | Facility: HOSPITAL | Age: 80
End: 2025-04-11
Attending: PHYSICIAN ASSISTANT
Payer: MEDICARE

## 2025-04-11 DIAGNOSIS — I48.91 NEW ONSET A-FIB: ICD-10-CM

## 2025-04-11 PROCEDURE — 93226 XTRNL ECG REC<48 HR SCAN A/R: CPT

## 2025-04-11 PROCEDURE — 93227 XTRNL ECG REC<48 HR R&I: CPT | Mod: ,,, | Performed by: INTERNAL MEDICINE

## 2025-04-15 LAB
OHS CV EVENT MONITOR DAY: 0
OHS CV HOLTER LENGTH DECIMAL HOURS: 47.98
OHS CV HOLTER LENGTH HOURS: 47
OHS CV HOLTER LENGTH MINUTES: 59
OHS CV HOLTER SINUS AVERAGE HR: 81
OHS CV HOLTER SINUS MAX HR: 118
OHS CV HOLTER SINUS MIN HR: 48

## 2025-04-17 ENCOUNTER — RESULTS FOLLOW-UP (OUTPATIENT)
Dept: CARDIOLOGY | Facility: CLINIC | Age: 80
End: 2025-04-17

## 2025-04-21 ENCOUNTER — TELEPHONE (OUTPATIENT)
Dept: CARDIOLOGY | Facility: CLINIC | Age: 80
End: 2025-04-21
Payer: MEDICARE

## 2025-04-21 DIAGNOSIS — I48.91 NEW ONSET A-FIB: Primary | ICD-10-CM

## 2025-04-21 NOTE — TELEPHONE ENCOUNTER
Patient scheduled for event monitor.    Future Appointments   Date Time Provider Department Center   4/24/2025  1:30 PM Sylvia Dela Cruz MD [facility] OPHTHAL [facility]   4/29/2025  2:15 PM Donna Gillis DPM [facility] PODIATR [facility]   5/5/2025  2:00 PM MONITOR, ARRHYTHMIA EVENT [facility] ARRHPRO [facility]   6/17/2025 12:30 PM LAB, [facility] LAB [facility]   6/17/2025  1:40 PM NOMC, DEXA1 [facility] BMD [facility]   6/24/2025 10:30 AM Abbey Fox NP [facility] ENDODIA [facility]   6/30/2025  4:00 PM Chinyere Cuello MD [facility] NEPHRO [facility]   7/24/2025  2:00 PM [facility]

## 2025-04-21 NOTE — TELEPHONE ENCOUNTER
----- Message from Elvia Lin PA-C sent at 4/21/2025  9:57 AM CDT -----  Ariel Teran, I spoke to this patient and let her know that Dr. Lee would like her to wear a 30 day monitor (which I will order for her now). Can you please assist her in scheduling that, and cancel her appointment for tomorrow. We will follow up with her once the 30 day monitor is complete. Thanks, Elvia

## 2025-04-23 DIAGNOSIS — K59.1 FUNCTIONAL DIARRHEA: ICD-10-CM

## 2025-04-23 RX ORDER — DIPHENOXYLATE HYDROCHLORIDE AND ATROPINE SULFATE 2.5; .025 MG/1; MG/1
1 TABLET ORAL 4 TIMES DAILY PRN
Qty: 30 TABLET | Refills: 0 | Status: SHIPPED | OUTPATIENT
Start: 2025-04-23

## 2025-04-23 NOTE — PROGRESS NOTES
HPI     Post-op Evaluation            Comments: Pt states eyes are doing well and denies any pain or other   symptoms today           Comments    -S/p phaco IOL OS 2/26/25  -S/p Phaco IOL OD 11/6/24    1. PCIOL OU  2. PVD OU  3. Retinal Drusen OU  4. Type 2 DM no DR  5. KAMRAN (on CPAP)  6. Essential HTN  7. Presbyopia    MEDS:  Finished drops          Last edited by Dayana Delgado MA on 4/24/2025  1:53 PM.            Assessment /Plan     For exam results, see Encounter Report.    Postoperative care for cataract    Type 2 diabetes mellitus without ophthalmic manifestations    PVD (posterior vitreous detachment), both eyes    KAMRAN on CPAP    Pseudophakia, both eyes          This patient is the wife of a long standing glaucoma patient of mine   Want to continue with me for yearly checks   S/P phaco/IOL oou now - doing well       PVD (posterior vitreous detachment), bilateral  Retinal drusen of both eyes  Arcus senilis of both corneas  Type 2 diabetes mellitus without ophthalmic manifestations  Essential hypertension              No retinopathy, monitor yearly   // OD done 11/6/2024             visually significant at this time OD>OS     Consider cat sx OD first     KAMRAN on CPAP     Presbyopia              Rx specs    Pt C/O blurry vision and glare - OD>OS   Has been getting worse over the last year   Visually sign OU -     HOLD TRULICITY FOR > 1 WEEK BEFORE SURGERY - pt is aware that if she does not hold this that the surgery will have to be canceled and re-scheduled - because of a increase in anesthetic risk .       Pt is referring 2 patients to me   Beena Nicole - h/o poor result post CE elsewhere   ? Harriet Moraes ( I use to see this patients mother Ms Eugenia treviño - who has passed)     Phaco / IOL OD Date: 11/6/2024 - general anesthesia - DIB00 24.0  POM 6   - phaco/IOL   Off gtts     Phaco / IOL OS Date: 2/26/2025 - DIB00 23.5  POM 2  - phaco/IOL   Doing well    To dr huerta for refraction     Wants to  F/U with me 1 x year (since I follow her  for glaucoma )

## 2025-04-24 ENCOUNTER — OFFICE VISIT (OUTPATIENT)
Dept: OPHTHALMOLOGY | Facility: CLINIC | Age: 80
End: 2025-04-24
Payer: MEDICARE

## 2025-04-24 DIAGNOSIS — E11.9 TYPE 2 DIABETES MELLITUS WITHOUT OPHTHALMIC MANIFESTATIONS: ICD-10-CM

## 2025-04-24 DIAGNOSIS — H43.813 PVD (POSTERIOR VITREOUS DETACHMENT), BOTH EYES: ICD-10-CM

## 2025-04-24 DIAGNOSIS — Z98.49 POSTOPERATIVE CARE FOR CATARACT: Primary | ICD-10-CM

## 2025-04-24 DIAGNOSIS — Z96.1 PSEUDOPHAKIA, BOTH EYES: ICD-10-CM

## 2025-04-24 DIAGNOSIS — G47.33 OSA ON CPAP: ICD-10-CM

## 2025-04-24 DIAGNOSIS — Z48.810 POSTOPERATIVE CARE FOR CATARACT: Primary | ICD-10-CM

## 2025-04-24 PROCEDURE — 1159F MED LIST DOCD IN RCRD: CPT | Mod: CPTII,S$GLB,, | Performed by: OPHTHALMOLOGY

## 2025-04-24 PROCEDURE — 99999 PR PBB SHADOW E&M-EST. PATIENT-LVL III: CPT | Mod: PBBFAC,,, | Performed by: OPHTHALMOLOGY

## 2025-04-24 PROCEDURE — 99024 POSTOP FOLLOW-UP VISIT: CPT | Mod: S$GLB,,, | Performed by: OPHTHALMOLOGY

## 2025-04-24 PROCEDURE — 3288F FALL RISK ASSESSMENT DOCD: CPT | Mod: CPTII,S$GLB,, | Performed by: OPHTHALMOLOGY

## 2025-04-24 PROCEDURE — 1160F RVW MEDS BY RX/DR IN RCRD: CPT | Mod: CPTII,S$GLB,, | Performed by: OPHTHALMOLOGY

## 2025-04-24 PROCEDURE — 1101F PT FALLS ASSESS-DOCD LE1/YR: CPT | Mod: CPTII,S$GLB,, | Performed by: OPHTHALMOLOGY

## 2025-04-24 PROCEDURE — 1126F AMNT PAIN NOTED NONE PRSNT: CPT | Mod: CPTII,S$GLB,, | Performed by: OPHTHALMOLOGY

## 2025-04-27 ENCOUNTER — PATIENT MESSAGE (OUTPATIENT)
Dept: CARDIOLOGY | Facility: CLINIC | Age: 80
End: 2025-04-27
Payer: MEDICARE

## 2025-05-05 ENCOUNTER — CLINICAL SUPPORT (OUTPATIENT)
Dept: CARDIOLOGY | Facility: HOSPITAL | Age: 80
End: 2025-05-05
Attending: PHYSICIAN ASSISTANT
Payer: MEDICARE

## 2025-05-05 DIAGNOSIS — I48.91 NEW ONSET A-FIB: ICD-10-CM

## 2025-05-05 PROCEDURE — 93270 REMOTE 30 DAY ECG REV/REPORT: CPT

## 2025-05-06 ENCOUNTER — PATIENT MESSAGE (OUTPATIENT)
Dept: ENDOCRINOLOGY | Facility: CLINIC | Age: 80
End: 2025-05-06
Payer: MEDICARE

## 2025-05-22 ENCOUNTER — PATIENT MESSAGE (OUTPATIENT)
Dept: CARDIOLOGY | Facility: CLINIC | Age: 80
End: 2025-05-22
Payer: MEDICARE

## 2025-05-22 ENCOUNTER — TELEPHONE (OUTPATIENT)
Dept: CARDIOLOGY | Facility: CLINIC | Age: 80
End: 2025-05-22
Payer: MEDICARE

## 2025-05-22 DIAGNOSIS — I48.91 NEW ONSET A-FIB: Primary | ICD-10-CM

## 2025-05-23 ENCOUNTER — TELEPHONE (OUTPATIENT)
Dept: ELECTROPHYSIOLOGY | Facility: CLINIC | Age: 80
End: 2025-05-23
Payer: MEDICARE

## 2025-05-23 DIAGNOSIS — I48.0 PAROXYSMAL A-FIB: Primary | ICD-10-CM

## 2025-05-23 NOTE — TELEPHONE ENCOUNTER
Spoke w. pt. She was updated on Ms Lin offered to order Pradaxa. She has not called her insurance as of now and had decided to go ahead and pay for the Eliquis as she has not been having any issue with the med so far.

## 2025-05-23 NOTE — TELEPHONE ENCOUNTER
----- Message from BEAU Hagan sent at 5/22/2025  4:50 PM CDT -----  Regarding: FW: Cost of Eliquis  Called pt and sent her a message.  ----- Message -----  From: Elvia Lin PA-C  Sent: 5/22/2025   4:33 PM CDT  To: Danya Pina RN  Subject: RE: Cost of Eliquis                              Unfortunately I can't really answer wether or not she will need to remain on it until her current heart monitor is completed. The 30 day period started 5/5, so she has over a week left and then it will take some time for the results to be read. This is becoming a debacle for her unfortunately, and I'm worried that Dr. Lee is going to recommend that she be seen by EP before stopping blood thinner. I'm going to place a referral to EP for her now to try to get ahead of that. Let's see what her insurance company says. If I need to prescribe generic pradaxa short term that's what I'll do. She needs to remain anticoagulated at least for the next 2-3 weeks. Please let me know and thanks- Elvia  ----- Message -----  From: Danya Pina RN  Sent: 5/22/2025   1:49 PM CDT  To: Danya Pina RN; Elvia Fuller#  Subject: Cost of Eliquis                                  Pt had her first month of Eliquis for free. She only has a few pills left. Her next month will be $250 as I verified w. the pharmacy. She will likely be done w. the deductible then. However she is reluctant to pay the money as she does not even know if she will have to stay on the med. She would have no issue paying the usual $47/ mth. Even if she qualifies for assistance we will not be able to finalize it over the next few days.I asked her to call the insurance to get more info on the deductible left, option of paying month by month and she verbalized understanding.Please advise on whether she will stay on Eliquis vs alternative.  ----- Message -----  From: Debbie Jimenez  Sent: 5/22/2025   9:20 AM CDT  To: Danya Pina,  RN  Subject: Assistance                                       Patient calling to see if she can get some assistance with apixaban (ELIQUIS) 5 mg Tab. Please call back @ [phone]. Thank you Debbie

## 2025-06-03 ENCOUNTER — OFFICE VISIT (OUTPATIENT)
Dept: PODIATRY | Facility: CLINIC | Age: 80
End: 2025-06-03
Payer: MEDICARE

## 2025-06-03 VITALS
SYSTOLIC BLOOD PRESSURE: 116 MMHG | HEIGHT: 62 IN | HEART RATE: 87 BPM | WEIGHT: 231.06 LBS | BODY MASS INDEX: 42.52 KG/M2 | DIASTOLIC BLOOD PRESSURE: 73 MMHG

## 2025-06-03 DIAGNOSIS — L84 CORN OR CALLUS: ICD-10-CM

## 2025-06-03 DIAGNOSIS — B35.1 DERMATOPHYTOSIS OF NAIL: ICD-10-CM

## 2025-06-03 DIAGNOSIS — E11.49 TYPE II DIABETES MELLITUS WITH NEUROLOGICAL MANIFESTATIONS: Primary | ICD-10-CM

## 2025-06-03 PROCEDURE — 99999 PR PBB SHADOW E&M-EST. PATIENT-LVL IV: CPT | Mod: PBBFAC,,, | Performed by: PODIATRIST

## 2025-06-04 ENCOUNTER — PATIENT MESSAGE (OUTPATIENT)
Dept: ENDOCRINOLOGY | Facility: CLINIC | Age: 80
End: 2025-06-04
Payer: MEDICARE

## 2025-06-06 ENCOUNTER — OFFICE VISIT (OUTPATIENT)
Dept: INTERNAL MEDICINE | Facility: CLINIC | Age: 80
End: 2025-06-06
Payer: MEDICARE

## 2025-06-06 VITALS
SYSTOLIC BLOOD PRESSURE: 106 MMHG | BODY MASS INDEX: 43.05 KG/M2 | HEART RATE: 85 BPM | HEIGHT: 62 IN | WEIGHT: 233.94 LBS | DIASTOLIC BLOOD PRESSURE: 56 MMHG | OXYGEN SATURATION: 96 %

## 2025-06-06 DIAGNOSIS — I10 ESSENTIAL HYPERTENSION: Chronic | ICD-10-CM

## 2025-06-06 DIAGNOSIS — H25.12 NUCLEAR SCLEROTIC CATARACT OF LEFT EYE: ICD-10-CM

## 2025-06-06 DIAGNOSIS — E78.5 HYPERLIPIDEMIA, UNSPECIFIED HYPERLIPIDEMIA TYPE: ICD-10-CM

## 2025-06-06 DIAGNOSIS — I50.32 CHRONIC HEART FAILURE WITH PRESERVED EJECTION FRACTION: ICD-10-CM

## 2025-06-06 DIAGNOSIS — N18.30 STAGE 3 CHRONIC KIDNEY DISEASE, UNSPECIFIED WHETHER STAGE 3A OR 3B CKD: ICD-10-CM

## 2025-06-06 DIAGNOSIS — Z95.1 S/P CABG (CORONARY ARTERY BYPASS GRAFT): ICD-10-CM

## 2025-06-06 DIAGNOSIS — E11.22 TYPE 2 DIABETES MELLITUS WITH STAGE 3A CHRONIC KIDNEY DISEASE, WITHOUT LONG-TERM CURRENT USE OF INSULIN: Chronic | ICD-10-CM

## 2025-06-06 DIAGNOSIS — Z00.00 ENCOUNTER FOR MEDICARE ANNUAL WELLNESS EXAM: Primary | ICD-10-CM

## 2025-06-06 DIAGNOSIS — G95.9 CERVICAL MYELOPATHY: ICD-10-CM

## 2025-06-06 DIAGNOSIS — M81.0 OSTEOPOROSIS WITHOUT CURRENT PATHOLOGICAL FRACTURE, UNSPECIFIED OSTEOPOROSIS TYPE: Chronic | ICD-10-CM

## 2025-06-06 DIAGNOSIS — N18.31 TYPE 2 DIABETES MELLITUS WITH STAGE 3A CHRONIC KIDNEY DISEASE, WITHOUT LONG-TERM CURRENT USE OF INSULIN: Chronic | ICD-10-CM

## 2025-06-06 DIAGNOSIS — G47.33 OSA ON CPAP: ICD-10-CM

## 2025-06-06 PROCEDURE — 99999 PR PBB SHADOW E&M-EST. PATIENT-LVL III: CPT | Mod: PBBFAC,,, | Performed by: NURSE PRACTITIONER

## 2025-06-11 ENCOUNTER — RESULTS FOLLOW-UP (OUTPATIENT)
Dept: CARDIOLOGY | Facility: CLINIC | Age: 80
End: 2025-06-11

## 2025-06-11 ENCOUNTER — OFFICE VISIT (OUTPATIENT)
Dept: OPTOMETRY | Facility: CLINIC | Age: 80
End: 2025-06-11
Payer: COMMERCIAL

## 2025-06-11 ENCOUNTER — LAB VISIT (OUTPATIENT)
Dept: LAB | Facility: HOSPITAL | Age: 80
End: 2025-06-11
Attending: NURSE PRACTITIONER
Payer: MEDICARE

## 2025-06-11 DIAGNOSIS — E11.22 TYPE 2 DIABETES MELLITUS WITH STAGE 3A CHRONIC KIDNEY DISEASE, WITHOUT LONG-TERM CURRENT USE OF INSULIN: ICD-10-CM

## 2025-06-11 DIAGNOSIS — N18.31 TYPE 2 DIABETES MELLITUS WITH STAGE 3A CHRONIC KIDNEY DISEASE, WITHOUT LONG-TERM CURRENT USE OF INSULIN: ICD-10-CM

## 2025-06-11 DIAGNOSIS — H35.363 RETINAL DRUSEN OF BOTH EYES: ICD-10-CM

## 2025-06-11 DIAGNOSIS — H52.4 PRESBYOPIA: Primary | ICD-10-CM

## 2025-06-11 DIAGNOSIS — H02.839 DERMATOCHALASIS, UNSPECIFIED LATERALITY: ICD-10-CM

## 2025-06-11 DIAGNOSIS — G47.33 OSA ON CPAP: ICD-10-CM

## 2025-06-11 DIAGNOSIS — Z96.1 PSEUDOPHAKIA OF BOTH EYES: ICD-10-CM

## 2025-06-11 PROBLEM — H25.12 NUCLEAR SCLEROTIC CATARACT OF LEFT EYE: Status: RESOLVED | Noted: 2024-11-06 | Resolved: 2025-06-11

## 2025-06-11 LAB
ALBUMIN SERPL BCP-MCNC: 3.6 G/DL (ref 3.5–5.2)
ALP SERPL-CCNC: 82 UNIT/L (ref 40–150)
ALT SERPL W/O P-5'-P-CCNC: 17 UNIT/L (ref 10–44)
ANION GAP (OHS): 8 MMOL/L (ref 8–16)
AST SERPL-CCNC: 20 UNIT/L (ref 11–45)
BILIRUB SERPL-MCNC: 0.4 MG/DL (ref 0.1–1)
BUN SERPL-MCNC: 25 MG/DL (ref 8–23)
CALCIUM SERPL-MCNC: 9.5 MG/DL (ref 8.7–10.5)
CHLORIDE SERPL-SCNC: 107 MMOL/L (ref 95–110)
CO2 SERPL-SCNC: 24 MMOL/L (ref 23–29)
CREAT SERPL-MCNC: 1.2 MG/DL (ref 0.5–1.4)
EAG (OHS): 143 MG/DL (ref 68–131)
GFR SERPLBLD CREATININE-BSD FMLA CKD-EPI: 46 ML/MIN/1.73/M2
GLUCOSE SERPL-MCNC: 150 MG/DL (ref 70–110)
HBA1C MFR BLD: 6.6 % (ref 4–5.6)
POTASSIUM SERPL-SCNC: 4.7 MMOL/L (ref 3.5–5.1)
PROT SERPL-MCNC: 7.6 GM/DL (ref 6–8.4)
SODIUM SERPL-SCNC: 139 MMOL/L (ref 136–145)
T4 FREE SERPL-MCNC: 0.98 NG/DL (ref 0.71–1.51)
TSH SERPL-ACNC: 4.03 UIU/ML (ref 0.4–4)

## 2025-06-11 PROCEDURE — 92015 DETERMINE REFRACTIVE STATE: CPT | Mod: S$GLB,,, | Performed by: OPTOMETRIST

## 2025-06-11 PROCEDURE — 80053 COMPREHEN METABOLIC PANEL: CPT

## 2025-06-11 PROCEDURE — 84443 ASSAY THYROID STIM HORMONE: CPT

## 2025-06-11 PROCEDURE — 99999 PR PBB SHADOW E&M-EST. PATIENT-LVL III: CPT | Mod: PBBFAC,,, | Performed by: OPTOMETRIST

## 2025-06-11 PROCEDURE — 36415 COLL VENOUS BLD VENIPUNCTURE: CPT | Mod: PN

## 2025-06-11 PROCEDURE — 92014 COMPRE OPH EXAM EST PT 1/>: CPT | Mod: S$GLB,,, | Performed by: OPTOMETRIST

## 2025-06-11 PROCEDURE — 84439 ASSAY OF FREE THYROXINE: CPT

## 2025-06-11 PROCEDURE — 83036 HEMOGLOBIN GLYCOSYLATED A1C: CPT

## 2025-06-11 NOTE — PROGRESS NOTES
HPI    Kaylee Romero is a/an 80 y.o. female who comes in for diabetic eye exam.   Pt. Just had cataract surgery OS 11/2024 and  OD 02/2025.  Pt. States needing glasses for near vision.    (--)blurred vision(--)Headaches(--)diplopia  (--)flashes(+)floaters(--)pain  (--)Itching(--)tearing(--)burning  (--)Dryness(--) OTC Drops(+)Photophobia    Gtt's:-    Hemoglobin A1C       Date                     Value               Ref Range             Status                01/24/2025               6.7 (H)             4.0 - 5.6 %           Final              Comment:    ADA Screening Guidelines:  5.7-6.4%  Consistent with   prediabetes  >or=6.5%  Consistent with diabetes    High levels of fetal   hemoglobin interfere with the HbA1C  assay. Heterozygous hemoglobin   variants (HbS, HgC, etc)do  not significantly interfere with this assay.     However, presence of multiple variants may affect accuracy.    ----------    Last edited by Mohini Ramirez on 6/11/2025  2:18 PM.            Assessment /Plan     For exam results, see Encounter Report.      Presbyopia              Rx specs    Pseudophakia of both eyes    [place]    Phaco / IOL OD Date: 11/6/2024    Phaco / IOL OS Date: 2/26/2025     KAMRAN on CPAP    Dermatochalasis, unspecified laterality    PVD (posterior vitreous detachment), bilateral    Arcus senilis of both corneas    Retinal drusen of both eyes   Stable, monitor     Type 2 diabetes mellitus without ophthalmic manifestations  Essential hypertension              No retinopathy, monitor yearly       RTC 1 year, [facility]

## 2025-06-12 ENCOUNTER — RESULTS FOLLOW-UP (OUTPATIENT)
Dept: ENDOCRINOLOGY | Facility: CLINIC | Age: 80
End: 2025-06-12

## 2025-06-16 NOTE — PROGRESS NOTES
"Patient ID: Kaylee Romero is a 80 y.o. female    Chief Complaint:   No chief complaint on file.    HPI: Kaylee Romero with type 2 diabetes complicated by CAD s/p CABG, TIAs, hypertension, dyslipidemia, CKD Stage 3, obesity, osteoporosis, and KAMRAN presents for follow up of Type 2 diabetes and osteoporosis. Previous patient of mine. Last visit in Feb 2025    She had right knee arthroscope in March 2023. She is no longer using walker or cane.     Denies falls since her last visit   Does report she had stiff legs since her last visit.  She is undergoing workup for atrial fibrillation and now on eliquis.     Wt Readings from Last 3 Encounters:   06/18/25 1345 106.8 kg (235 lb 9 oz)   06/06/25 1415 106.1 kg (233 lb 14.5 oz)   06/03/25 1314 104.8 kg (231 lb 0.7 oz)     With regards to the diabetes:     Diagnosed with Type 2 diabetes: 2013  Family history of Type 2 diabetes: father  Known complications:  DKA-  RN-; she had cataract surgery on one eye since her last visit and has upcoming in Feb 2025  PN- seeing podiatry 1/29/2025; denies tingling in her hands   Nephropathy: now seeing nephrology 12/2024  Gastroparesis: denies   CAD: 3 MI and one stroke     Current regimen:  Trulicity 4.5 mg weekly   Glipizide 10 mg twice daily with meals breakfast and dinner     NOVOLOG PRN steroid injection-none recent    Other medications tried:  Jardiance-insurance issues  Brad Whitlock - did not want to start due to SE profile  Trulicity    finerenone but she stopped rising of her and "foamy urine" and possible symptoms of UTI  She has been able to stop insulin but had to resume glipizide with biggest meal of the day.      Glucose Monitor:  Dexcom G7  Dexcom: average blood sugar 150; <1% VERY LOW; 0% low; 82% in range; 18% high; 0% very high  Blood sugars are mostly at goal; some PP hyperglycemia     Hypoglycemia: none and denies hypoglycemia   Denies symptoms of hypoglycemia-hypoglycemia unawareness     Knows how to " correct with 15 grams of carbs- juice, coke, or a peppermint.      Diet/Exercise: She was seeing a dietician -Jimmy. She is now on protein shakes and food -careful with her food choices.   Usually gives up sweets for LENT.   Strawberries and prunes to treat hypoglycemia.  Snacks: more sweets lately  Drinks: mostly water  Exercise - she is doing bike 60 minutes a couple of times a week and planning on increasing     Education - last visit: saw in Nov 2023  Has GVOKE    Denies history of pancreatitis & personal/family history of medullary thyroid cancer.    Lab Results   Component Value Date    HGBA1C 6.6 (H) 06/11/2025    HGBA1C 6.7 (H) 01/24/2025    HGBA1C 6.7 (H) 08/09/2024     Screening or Prevention Patient's value Goal Complete/Controlled?   HgA1C Testing and Control   Lab Results   Component Value Date    HGBA1C 6.6 (H) 06/11/2025      Annually/Less than 8% Yes   Lipid profile : 08/09/2024 Annually Yes   LDL control Lab Results   Component Value Date    LDLCALC 40.2 (L) 08/09/2024    Annually/Less than 100 mg/dl  Yes   Nephropathy screening Lab Results   Component Value Date    LABMICR 199.0 10/14/2024     Lab Results   Component Value Date    PROTEINUA Negative 01/24/2025    Annually No   Blood pressure BP Readings from Last 1 Encounters:   06/18/25 118/68    Less than 140/90 Yes   Dilated retinal exam : 06/11/2025 Annually Yes   Foot exam   : 10/02/2024 Annually Yes     With regards to osteoporosis:   Current meds:   Fosamax  Started: 6/2019  Stopped 9/2024    She needs to have dental extraction and was planned for April 2025 but reports she has not had pain so has not needed extraction yet. She is not interested in resuming fosamax until she has dental work done.   Bone density done in June but not read yet.     Family history: mother     Calcium intake- no calcium supplementation; has in diet   Vit D intake-  1000 twice weekly   Weight bearing exercise-   Walking as tolerated with knee pain    Recent  falls- August 2018; September 2018 - no fractures and no falls from a standing position.      Steroid injection in spine around 11/15/23  Numbness caused falls below   3 falls in 6 weeks from Oct 2023-Nov 2023   Pain med steroids in Nov 2023     They have made fall precautions in house   No recent fractures   No significant height loss (>2 inches)     tob use -  None  etoh use- some 1-2 glasses of wine every once in awhile     No current diarrhea or h/o malabsorption     Menopause at age 56     Denies chronic exposure to anticoagulants, proton pump inhibitors or antiseizure medications.   +Steroid injections knees     She is using walker at home  She is using wheelchair in clinic    Denies GERD, indigestion, or gastric bypass surgery.      Denies history of thyroid disease, anemia, kidney stones or kidney disease.      Denies active malignancy, history of malignancy the involved the bone, prior radiation treatment, or unexplained elevations of alk phos on labs      BMD 6/17/2025  Not read yet     With regards to the vitamin d deficiency and hypercalcemia,     Transient increase in calcium level in Sept 2021 that normalized when we decrease Vitamin D and stopped chlorthalidone     Currently taking otc 1000 twice weekly    Lab Results   Component Value Date    PTH 55.4 06/21/2024    CALCIUM 9.5 06/11/2025    PHOS 3.1 04/02/2025     Lab Results   Component Value Date    ALBUMIN 3.6 06/11/2025     Vit D, 25-Hydroxy   Date Value Ref Range Status   06/21/2024 47 30 - 96 ng/mL Final     Comment:     Vitamin D deficiency.........<10 ng/mL                              Vitamin D insufficiency......10-29 ng/mL       Vitamin D sufficiency........> or equal to 30 ng/mL  Vitamin D toxicity............>100 ng/mL       Denies constipation,   Has been having more stress lately   Denies depression    Increased polyuria due to lasix  + muscle aches or pains and stiffness in legs     No recent fractures   Denies falls since her last  visit     Has seen GI for diarrhea and does take Lomtil with some relief.     With regards to abnormal TFTs,    Denies constipation   + fatigue, mental fog      Latest Reference Range & Units 07/11/24 15:50 06/11/25 15:58   TSH 0.400 - 4.000 uIU/mL 2.796 4.026 (H)   Free T4 0.71 - 1.51 ng/dL  0.98   (H): Data is abnormally high    FIB-4 Calculation: 1.57 at 8/16/2024  2:01 PM     FIB-4 below 1.30 is considered as low-risk for advanced fibrosis  FIB-4 over 2.67 is considered as high-risk for advanced fibrosis  FIB-4 values between 1.30 and 2.67 are considered as intermediate-risk of advanced fibrosis for ages 36-64.     For ages > 64 the cut-off for low-risk goes to < 2.  This is a screening tool and clinical judgement should be used in the interpretation of these results.    Review of Systems   Constitutional:  Negative for fatigue and unexpected weight change.   Eyes:  Negative for visual disturbance.   Endocrine: Negative for polydipsia, polyphagia and polyuria.   Musculoskeletal:  Positive for arthralgias and myalgias. Negative for gait problem and neck pain.   Hematological:  Does not bruise/bleed easily.     As above    OBJECTIVE    Physical Exam  Constitutional:       Appearance: Normal appearance.   Neck:      Thyroid: No thyromegaly.   Pulmonary:      Effort: Pulmonary effort is normal.   Neurological:      Mental Status: She is alert.     injection sites are without edema or erythema. No lipo hypertropthy or atrophy  DM foot exam deferred; done in 10/2024    Wt Readings from Last 3 Encounters:   06/18/25 1345 106.8 kg (235 lb 9 oz)   06/06/25 1415 106.1 kg (233 lb 14.5 oz)   06/03/25 1314 104.8 kg (231 lb 0.7 oz)     Vitals:    06/18/25 1345   BP: 118/68   Pulse: 92           Current Outpatient Medications:     acetaminophen (TYLENOL) 500 MG tablet, Take 500 mg by mouth 2 (two) times daily as needed for Pain., Disp: , Rfl:     amLODIPine (NORVASC) 10 MG tablet, TAKE 1 TABLET(10 MG) BY MOUTH EVERY DAY, Disp:  "90 tablet, Rfl: 1    apixaban (ELIQUIS) 5 mg Tab, Take 1 tablet (5 mg total) by mouth 2 (two) times daily., Disp: 60 tablet, Rfl: 11    atorvastatin (LIPITOR) 80 MG tablet, Take 1 tablet (80 mg total) by mouth once daily., Disp: 90 tablet, Rfl: 1    BD BOO 2ND GEN PEN NEEDLE 32 gauge x 5/32" Ndle, To injection daily, Disp: 100 each, Rfl: 8    blood sugar diagnostic Strp, 1 strip by Misc.(Non-Drug; Combo Route) route once daily., Disp: 100 strip, Rfl: 3    blood-glucose meter,continuous (DEXCOM G7 ) Misc, To use with sensor, Disp: 1 each, Rfl: 0    blood-glucose sensor (DEXCOM G7 SENSOR) Areli, To change every 10 days, Disp: 3 each, Rfl: 3    busPIRone (BUSPAR) 5 MG Tab, Take 1 tablet (5 mg total) by mouth 2 (two) times daily. For Anxiety., Disp: 60 tablet, Rfl: 2    carvediloL (COREG) 25 MG tablet, Take 1 tablet (25 mg total) by mouth 2 (two) times daily., Disp: 180 tablet, Rfl: 2    diphenoxylate-atropine 2.5-0.025 mg (LOMOTIL) 2.5-0.025 mg per tablet, Take 1 tablet by mouth 4 (four) times daily as needed. for diarrhea, Disp: 30 tablet, Rfl: 0    FEROSUL 325 mg (65 mg iron) Tab tablet, TAKE 1 TABLET BY MOUTH DAILY, Disp: 90 tablet, Rfl: 1    furosemide (LASIX) 20 MG tablet, TAKE 1 TABLET BY MOUTH ON SATURDAY, SUNDAY, MONDAY, WEDNESDAY, FRIDAY, Disp: 90 tablet, Rfl: 3    gabapentin (NEURONTIN) 100 MG capsule, Take 1-2 capsules (100-200 mg total) by mouth every evening., Disp: 90 capsule, Rfl: 2    glipiZIDE (GLUCOTROL) 5 MG tablet, TAKE 2 TABLETS(10 MG) BY MOUTH TWICE DAILY WITH MEALS, Disp: 360 tablet, Rfl: 3    glucagon (GVOKE HYPOPEN 2-PACK) 1 mg/0.2 mL AtIn, Inject 1 Package into the skin as needed., Disp: 2 Syringe, Rfl: 0    irbesartan (AVAPRO) 300 MG tablet, Take 1 tablet (300 mg total) by mouth every evening., Disp: 90 tablet, Rfl: 2    isosorbide mononitrate (IMDUR) 30 MG 24 hr tablet, Take 2 tablets (60 mg total) by mouth once daily., Disp: 180 tablet, Rfl: 3    LIDOcaine (LIDODERM) 5 %, Place 1 " patch onto the skin once daily. Remove & Discard patch within 12 hours or as directed by MD, Disp: 14 patch, Rfl: 0    multivitamin (THERAGRAN) per tablet, Take 1 tablet by mouth once daily., Disp: , Rfl:     mupirocin (BACTROBAN) 2 % ointment, Apply topically 3 (three) times daily., Disp: 22 g, Rfl: 0    vitamin D (VITAMIN D3) 1000 units Tab, Take 1,000 Units by mouth twice a week. Monday AND THURSDAYS ONLY, Disp: , Rfl:     nitroGLYCERIN (NITROSTAT) 0.4 MG SL tablet, Place 1 tablet (0.4 mg total) under the tongue every 5 (five) minutes as needed for Chest pain., Disp: 100 tablet, Rfl: 1    semaglutide (OZEMPIC) 2 mg/dose (8 mg/3 mL) PnIj, Inject 2 mg into the skin every 7 days., Disp: 3 mL, Rfl: 11    Labs reviewed    Assessment and plan:   1. Type 2 diabetes mellitus with stage 3a chronic kidney disease, without long-term current use of insulin  semaglutide (OZEMPIC) 2 mg/dose (8 mg/3 mL) PnIj      2. Age-related osteoporosis without current pathological fracture        3. Class 3 severe obesity due to excess calories with serious comorbidity and body mass index (BMI) of 40.0 to 44.9 in adult        4. Vitamin D deficiency, unspecified        5. Essential hypertension        6. Hyperlipidemia, unspecified hyperlipidemia type        7. Osteoporosis without current pathological fracture, unspecified osteoporosis type        8. Abnormal thyroid function test  TSH    Thyroid Peroxidase Antibody        Type 2 diabetes mellitus with stage 3 chronic kidney disease, without long-term current use of insulin  -- Labs prior  -- A1c 7-7.5% without hypoglycemia.      A1c improved and at goal  Dexcom: average blood sugar 161; <1% VERY LOW; 1% low; 70% in range; 27% high; 0% very high; GMI 7.2%   Blood sugars are mostly at goal; some PP hyperglycemia     She would like to lose weight. Will switch to Ozempic with patient care assistance   We have been avoiding SGLT2 due to UTIs     Can consider switching to Mounajro once  accepting new applications     She is having hypoglycemia unawareness. I recommend dexcom for high and low blood sugar alerts. She also had hypoglycemia on labs BG 66 and did was not aware.      Patient has diabetes mellitus and manages diabetes with an intensive insulin regimen. The patient requires a therapeutic CGM and is willing to use therapeutic CGM for the necesary frequent adjustments of insulin therapy. Patient has been using SMBG for frequent glucose monitoring (4+ times daily). I have completed an in-person visit during the previous 6 months and will continue to have in-person visits every 6 months to assess adherence to their CGM regimen and diabetes treatment plan.    Medication Changes   Switch Trulicity to Ozempic 2 mg weekly   Continue glipizide 10 mg with twice daily with meals BUT decrease to glipizide 5 mg with small meal      Consider switching to glimepiride in the future   -- We will continue novolog for steroid injections. Discussed she will take the novolog before meals with sliding scale below.  With using correction scale:  MDD of:   150-200: +2 units  201-250: +4 units  251-300: +6 units  301-350: +8 units  >350: +10 units    -- Has GVOKE pen.   -- She is fearful of DKA Discussed signs and symptoms of DKA and instructed to buy ketone strips  -- Reviewed goals of therapy are to get the best control we can without hypoglycemia  -- Insulin injection sites and proper rotation instructed.    -- Advised frequent self blood glucose monitoring.  Patient encouraged to document glucose results and bring them to every clinic visit.  -- Hypoglycemia precautions discussed. Instructed on precautions before driving.    -- Call for Bg repeatedly < 90 or > 180.   -- Close adherence to lifestyle changes recommended.   -- Periodic follow ups for eye evaluations, foot care and dental care suggested. UTD    Age-related osteoporosis without current pathological fracture  See below    Class 3 severe obesity due  to excess calories with serious comorbidity and body mass index (BMI) of 40.0 to 44.9 in adult  Encouraged continued exercise and diet.  Given information on diet  Continue trulicity/ozempic that will aid in weight loss.  DST was at goal     Vitamin D deficiency, unspecified  Normal  Continue vit d 1000 twice weekly    Essential hypertension  -- on ARB  -- Controlled  -- Blood pressure goals discussed with patient    Hyperlipidemia  LDL at goal on max dose statin    Osteoporosis without current pathological fracture  Fosamax  Started: 6/2019  Stopped 9/2024    She needs to have dental extraction and was planned for April 2025 but reports she has not had pain so has not needed extraction yet. She is not interested in resuming fosamax until she has dental work done.   Bone density done in June but not read yet.       -- Reviewed basics of quantity versus quality  -- Reassuring she is not fracturing   -- RDA of calcium (6123-0723 mg /day in divided doses) and vitamin D 2000iu a day  discussed  -- Calcium from food sources preferred  -- Fall precautions emphasized  -- +weight bearing exercise    -- Repeat DEXA scan in June 2027      Abnormal thyroid function test  TSH above 4   Check labs with TPO    Visit today included increased complexity associated with the care of episodic problems Type 2 diabetes, hypertension, obesity, vitamin D deficiency addressed and managing longitudinal care of the patient due to serious managed problems Type 2 diabetes, hypertension, obesity, vitamin D deficiency    Follow up in about 3 months (around 9/18/2025).  Labs prior to RTC

## 2025-06-17 ENCOUNTER — HOSPITAL ENCOUNTER (OUTPATIENT)
Dept: RADIOLOGY | Facility: CLINIC | Age: 80
Discharge: HOME | End: 2025-06-17
Attending: NURSE PRACTITIONER
Payer: MEDICARE

## 2025-06-17 DIAGNOSIS — M81.0 OSTEOPOROSIS WITHOUT CURRENT PATHOLOGICAL FRACTURE, UNSPECIFIED OSTEOPOROSIS TYPE: Chronic | ICD-10-CM

## 2025-06-17 PROCEDURE — 77080 DXA BONE DENSITY AXIAL: CPT | Mod: TC

## 2025-06-18 ENCOUNTER — OFFICE VISIT (OUTPATIENT)
Dept: ENDOCRINOLOGY | Facility: CLINIC | Age: 80
End: 2025-06-18
Payer: MEDICARE

## 2025-06-18 ENCOUNTER — TELEPHONE (OUTPATIENT)
Dept: PHARMACY | Facility: CLINIC | Age: 80
End: 2025-06-18
Payer: MEDICARE

## 2025-06-18 VITALS
HEART RATE: 92 BPM | HEIGHT: 62 IN | DIASTOLIC BLOOD PRESSURE: 68 MMHG | WEIGHT: 235.56 LBS | OXYGEN SATURATION: 98 % | BODY MASS INDEX: 43.35 KG/M2 | SYSTOLIC BLOOD PRESSURE: 118 MMHG

## 2025-06-18 DIAGNOSIS — M81.0 AGE-RELATED OSTEOPOROSIS WITHOUT CURRENT PATHOLOGICAL FRACTURE: ICD-10-CM

## 2025-06-18 DIAGNOSIS — M81.0 OSTEOPOROSIS WITHOUT CURRENT PATHOLOGICAL FRACTURE, UNSPECIFIED OSTEOPOROSIS TYPE: Chronic | ICD-10-CM

## 2025-06-18 DIAGNOSIS — E66.01 CLASS 3 SEVERE OBESITY DUE TO EXCESS CALORIES WITH SERIOUS COMORBIDITY AND BODY MASS INDEX (BMI) OF 40.0 TO 44.9 IN ADULT: ICD-10-CM

## 2025-06-18 DIAGNOSIS — N18.31 TYPE 2 DIABETES MELLITUS WITH STAGE 3A CHRONIC KIDNEY DISEASE, WITHOUT LONG-TERM CURRENT USE OF INSULIN: Primary | Chronic | ICD-10-CM

## 2025-06-18 DIAGNOSIS — E11.22 TYPE 2 DIABETES MELLITUS WITH STAGE 3A CHRONIC KIDNEY DISEASE, WITHOUT LONG-TERM CURRENT USE OF INSULIN: Primary | Chronic | ICD-10-CM

## 2025-06-18 DIAGNOSIS — I10 ESSENTIAL HYPERTENSION: Chronic | ICD-10-CM

## 2025-06-18 DIAGNOSIS — E55.9 VITAMIN D DEFICIENCY, UNSPECIFIED: ICD-10-CM

## 2025-06-18 DIAGNOSIS — E78.5 HYPERLIPIDEMIA, UNSPECIFIED HYPERLIPIDEMIA TYPE: ICD-10-CM

## 2025-06-18 DIAGNOSIS — R94.6 ABNORMAL THYROID FUNCTION TEST: ICD-10-CM

## 2025-06-18 DIAGNOSIS — E66.813 CLASS 3 SEVERE OBESITY DUE TO EXCESS CALORIES WITH SERIOUS COMORBIDITY AND BODY MASS INDEX (BMI) OF 40.0 TO 44.9 IN ADULT: ICD-10-CM

## 2025-06-18 PROCEDURE — 99999 PR PBB SHADOW E&M-EST. PATIENT-LVL V: CPT | Mod: PBBFAC,,, | Performed by: NURSE PRACTITIONER

## 2025-06-18 RX ORDER — SEMAGLUTIDE 2.68 MG/ML
2 INJECTION, SOLUTION SUBCUTANEOUS
Qty: 3 ML | Refills: 11 | Status: SHIPPED | OUTPATIENT
Start: 2025-06-18 | End: 2026-06-18

## 2025-06-18 NOTE — TELEPHONE ENCOUNTER
----- Message -----  From: Kaylee Esparza  Sent: 6/18/2025   2:50 PM CDT  To: Abbey Fox NP    What's the patient's name and MRN  ----- Message -----  From: Abbey Fox NP  Sent: 6/18/2025   2:34 PM CDT  To: Pharmacy Patient Assistance Team    Good morning,    We are switching Trulicity to Ozempic. Please help her obtain through [employer].    Thank you,  Abbey Fox NP

## 2025-06-18 NOTE — ASSESSMENT & PLAN NOTE
Encouraged continued exercise and diet.  Given information on diet  Continue trulicity/ozempic that will aid in weight loss.  DST was at goal

## 2025-06-18 NOTE — ASSESSMENT & PLAN NOTE
Fosamax  Started: 6/2019  Stopped 9/2024    She needs to have dental extraction and was planned for April 2025 but reports she has not had pain so has not needed extraction yet. She is not interested in resuming fosamax until she has dental work done.   Bone density done in June but not read yet.       -- Reviewed basics of quantity versus quality  -- Reassuring she is not fracturing   -- RDA of calcium (1305-5698 mg /day in divided doses) and vitamin D 2000iu a day  discussed  -- Calcium from food sources preferred  -- Fall precautions emphasized  -- +weight bearing exercise    -- Repeat DEXA scan in June 2027

## 2025-06-18 NOTE — TELEPHONE ENCOUNTER
Ozempic 2mg application has been submitted to the [employer] Patient Assistance Program for consideration of eligibility.

## 2025-06-18 NOTE — ASSESSMENT & PLAN NOTE
-- Labs prior  -- A1c 7-7.5% without hypoglycemia.      A1c improved and at goal  Dexcom: average blood sugar 161; <1% VERY LOW; 1% low; 70% in range; 27% high; 0% very high; GMI 7.2%   Blood sugars are mostly at goal; some PP hyperglycemia     She would like to lose weight. Will switch to Ozempic with patient care assistance   We have been avoiding SGLT2 due to UTIs     Can consider switching to Mounajro once accepting new applications     She is having hypoglycemia unawareness. I recommend dexcom for high and low blood sugar alerts. She also had hypoglycemia on labs BG 66 and did was not aware.      Patient has diabetes mellitus and manages diabetes with an intensive insulin regimen. The patient requires a therapeutic CGM and is willing to use therapeutic CGM for the necesary frequent adjustments of insulin therapy. Patient has been using SMBG for frequent glucose monitoring (4+ times daily). I have completed an in-person visit during the previous 6 months and will continue to have in-person visits every 6 months to assess adherence to their CGM regimen and diabetes treatment plan.    Medication Changes   Switch Trulicity to Ozempic 2 mg weekly   Continue glipizide 10 mg with twice daily with meals BUT decrease to glipizide 5 mg with small meal      Consider switching to glimepiride in the future   -- We will continue novolog for steroid injections. Discussed she will take the novolog before meals with sliding scale below.  With using correction scale:  MDD of:   150-200: +2 units  201-250: +4 units  251-300: +6 units  301-350: +8 units  >350: +10 units    -- Has GVOKE pen.   -- She is fearful of DKA Discussed signs and symptoms of DKA and instructed to buy ketone strips  -- Reviewed goals of therapy are to get the best control we can without hypoglycemia  -- Insulin injection sites and proper rotation instructed.    -- Advised frequent self blood glucose monitoring.  Patient encouraged to document glucose results  and bring them to every clinic visit.  -- Hypoglycemia precautions discussed. Instructed on precautions before driving.    -- Call for Bg repeatedly < 90 or > 180.   -- Close adherence to lifestyle changes recommended.   -- Periodic follow ups for eye evaluations, foot care and dental care suggested. UTD

## 2025-06-18 NOTE — PATIENT INSTRUCTIONS
EKG Anywhere, Anytime  Vitaliy     Diabetes  A1c improved and at goal  Dexcom: average blood sugar 161; <1% VERY LOW; 1% low; 70% in range; 27% high; 0% very high; GMI 7.2%   Blood sugars are mostly at goal; some PP hyperglycemia     She would like to lose weight. Will switch to Ozempic with patient care assistance   We have been avoiding SGLT2 due to UTIs     Can consider switching to Moun[employer] once accepting new applications     She is having hypoglycemia unawareness. I recommend dexcom for high and low blood sugar alerts. She also had hypoglycemia on labs BG 66 and did was not aware.      Patient has diabetes mellitus and manages diabetes with an intensive insulin regimen. The patient requires a therapeutic CGM and is willing to use therapeutic CGM for the necesary frequent adjustments of insulin therapy. Patient has been using SMBG for frequent glucose monitoring (4+ times daily). I have completed an in-person visit during the previous 6 months and will continue to have in-person visits every 6 months to assess adherence to their CGM regimen and diabetes treatment plan.    Medication Changes   Switch Trulicity to Ozempic 2 mg weekly   Continue glipizide 10 mg with twice daily with meals BUT decrease to glipizide 5 mg with small meal      Consider switching to glimepiride in the future      Osteoporosis               Continue Vitamin D 2000 IU twice weekly               RDA of calcium is 2377-9888 mg daily, preferable from food               Avoid alcohol and tobacco and falls              Check bone density in June 2025              Off fosamax since Sept 2024     Here are the instructions for the dexamethasone suppression test.     Please take 1 mg dexamethasone between 11 PM-12 AM. Then have your blood drawn at 8-9 AM the next morning.     I have sent the prescription of dexamethasone to your pharmacy and entered the blood tests for you.      Latest Reference Range & Units 06/11/25 15:58   Sodium 136 - 145  mmol/L 139   Potassium 3.5 - 5.1 mmol/L 4.7   Chloride 95 - 110 mmol/L 107   CO2 23 - 29 mmol/L 24   Anion Gap 8 - 16 mmol/L 8   BUN 8 - 23 mg/dL 25 (H)   Creatinine 0.5 - 1.4 mg/dL 1.2   eGFR >60 mL/min/1.73/m2 46 (L)   Glucose 70 - 110 mg/dL 150 (H)   Calcium 8.7 - 10.5 mg/dL 9.5   ALP 40 - 150 unit/L 82   PROTEIN TOTAL 6.0 - 8.4 gm/dL 7.6   Albumin 3.5 - 5.2 g/dL 3.6   BILIRUBIN TOTAL 0.1 - 1.0 mg/dL 0.4   AST 11 - 45 unit/L 20   ALT 10 - 44 unit/L 17   Hemoglobin A1C External 4.0 - 5.6 % 6.6 (H)   Estimated Avg Glucose 68 - 131 mg/dL 143 (H)   TSH 0.400 - 4.000 uIU/mL 4.026 (H)   Free T4 0.71 - 1.51 ng/dL 0.98   (H): Data is abnormally high  (L): Data is abnormally low

## 2025-06-23 ENCOUNTER — PATIENT MESSAGE (OUTPATIENT)
Dept: ENDOCRINOLOGY | Facility: CLINIC | Age: 80
End: 2025-06-23
Payer: MEDICARE

## 2025-06-26 ENCOUNTER — HOSPITAL ENCOUNTER (OUTPATIENT)
Dept: CARDIOLOGY | Facility: CLINIC | Age: 80
Discharge: HOME | End: 2025-06-26
Payer: MEDICARE

## 2025-06-26 ENCOUNTER — OFFICE VISIT (OUTPATIENT)
Dept: ELECTROPHYSIOLOGY | Facility: CLINIC | Age: 80
End: 2025-06-26
Payer: MEDICARE

## 2025-06-26 VITALS
BODY MASS INDEX: 43.94 KG/M2 | SYSTOLIC BLOOD PRESSURE: 103 MMHG | HEART RATE: 65 BPM | WEIGHT: 238.75 LBS | HEIGHT: 62 IN | DIASTOLIC BLOOD PRESSURE: 59 MMHG

## 2025-06-26 DIAGNOSIS — Z95.1 S/P CABG (CORONARY ARTERY BYPASS GRAFT): ICD-10-CM

## 2025-06-26 DIAGNOSIS — I48.0 PAROXYSMAL ATRIAL FIBRILLATION: ICD-10-CM

## 2025-06-26 DIAGNOSIS — I35.0 NONRHEUMATIC AORTIC VALVE STENOSIS: ICD-10-CM

## 2025-06-26 DIAGNOSIS — E11.22 TYPE 2 DIABETES MELLITUS WITH STAGE 3A CHRONIC KIDNEY DISEASE, WITHOUT LONG-TERM CURRENT USE OF INSULIN: Chronic | ICD-10-CM

## 2025-06-26 DIAGNOSIS — Z86.73 HISTORY OF TIA (TRANSIENT ISCHEMIC ATTACK): ICD-10-CM

## 2025-06-26 DIAGNOSIS — I48.0 PAROXYSMAL A-FIB: ICD-10-CM

## 2025-06-26 DIAGNOSIS — R00.1 BRADYCARDIA: ICD-10-CM

## 2025-06-26 DIAGNOSIS — Z95.1 HX OF CABG: ICD-10-CM

## 2025-06-26 DIAGNOSIS — Z79.01 CHRONIC ANTICOAGULATION: ICD-10-CM

## 2025-06-26 DIAGNOSIS — I10 PRIMARY HYPERTENSION: ICD-10-CM

## 2025-06-26 DIAGNOSIS — E66.01 CLASS 3 SEVERE OBESITY WITH BODY MASS INDEX (BMI) OF 40.0 TO 44.9 IN ADULT, UNSPECIFIED OBESITY TYPE, UNSPECIFIED WHETHER SERIOUS COMORBIDITY PRESENT: ICD-10-CM

## 2025-06-26 DIAGNOSIS — N18.30 STAGE 3 CHRONIC KIDNEY DISEASE, UNSPECIFIED WHETHER STAGE 3A OR 3B CKD: ICD-10-CM

## 2025-06-26 DIAGNOSIS — I50.32 CHRONIC HEART FAILURE WITH PRESERVED EJECTION FRACTION: ICD-10-CM

## 2025-06-26 DIAGNOSIS — Z96.652 HISTORY OF TOTAL KNEE ARTHROPLASTY, LEFT: ICD-10-CM

## 2025-06-26 DIAGNOSIS — I44.0 FIRST DEGREE AV BLOCK: ICD-10-CM

## 2025-06-26 DIAGNOSIS — E78.2 MIXED HYPERLIPIDEMIA: ICD-10-CM

## 2025-06-26 DIAGNOSIS — G47.33 OSA ON CPAP: ICD-10-CM

## 2025-06-26 DIAGNOSIS — I25.810 CORONARY ARTERY DISEASE INVOLVING CORONARY BYPASS GRAFT OF NATIVE HEART WITHOUT ANGINA PECTORIS: ICD-10-CM

## 2025-06-26 DIAGNOSIS — N18.31 TYPE 2 DIABETES MELLITUS WITH STAGE 3A CHRONIC KIDNEY DISEASE, WITHOUT LONG-TERM CURRENT USE OF INSULIN: Chronic | ICD-10-CM

## 2025-06-26 DIAGNOSIS — E66.813 CLASS 3 SEVERE OBESITY WITH BODY MASS INDEX (BMI) OF 40.0 TO 44.9 IN ADULT, UNSPECIFIED OBESITY TYPE, UNSPECIFIED WHETHER SERIOUS COMORBIDITY PRESENT: ICD-10-CM

## 2025-06-26 DIAGNOSIS — M15.9 OSTEOARTHRITIS OF MULTIPLE JOINTS, UNSPECIFIED OSTEOARTHRITIS TYPE: ICD-10-CM

## 2025-06-26 DIAGNOSIS — M81.0 AGE-RELATED OSTEOPOROSIS WITHOUT CURRENT PATHOLOGICAL FRACTURE: Chronic | ICD-10-CM

## 2025-06-26 DIAGNOSIS — I49.1 PAC (PREMATURE ATRIAL CONTRACTION): Primary | ICD-10-CM

## 2025-06-26 DIAGNOSIS — Z95.5 HISTORY OF CORONARY ANGIOPLASTY WITH INSERTION OF STENT: ICD-10-CM

## 2025-06-26 LAB
OHS QRS DURATION: 74 MS
OHS QTC CALCULATION: 401 MS

## 2025-06-26 PROCEDURE — 99999 PR PBB SHADOW E&M-EST. PATIENT-LVL V: CPT | Mod: PBBFAC,,, | Performed by: STUDENT IN AN ORGANIZED HEALTH CARE EDUCATION/TRAINING PROGRAM

## 2025-06-26 PROCEDURE — 1101F PT FALLS ASSESS-DOCD LE1/YR: CPT | Mod: CPTII,S$GLB,, | Performed by: STUDENT IN AN ORGANIZED HEALTH CARE EDUCATION/TRAINING PROGRAM

## 2025-06-26 PROCEDURE — 1159F MED LIST DOCD IN RCRD: CPT | Mod: CPTII,S$GLB,, | Performed by: STUDENT IN AN ORGANIZED HEALTH CARE EDUCATION/TRAINING PROGRAM

## 2025-06-26 PROCEDURE — 3288F FALL RISK ASSESSMENT DOCD: CPT | Mod: CPTII,S$GLB,, | Performed by: STUDENT IN AN ORGANIZED HEALTH CARE EDUCATION/TRAINING PROGRAM

## 2025-06-26 PROCEDURE — 99205 OFFICE O/P NEW HI 60 MIN: CPT | Mod: S$GLB,,, | Performed by: STUDENT IN AN ORGANIZED HEALTH CARE EDUCATION/TRAINING PROGRAM

## 2025-06-26 PROCEDURE — 3074F SYST BP LT 130 MM HG: CPT | Mod: CPTII,S$GLB,, | Performed by: STUDENT IN AN ORGANIZED HEALTH CARE EDUCATION/TRAINING PROGRAM

## 2025-06-26 PROCEDURE — 3078F DIAST BP <80 MM HG: CPT | Mod: CPTII,S$GLB,, | Performed by: STUDENT IN AN ORGANIZED HEALTH CARE EDUCATION/TRAINING PROGRAM

## 2025-06-26 PROCEDURE — 1126F AMNT PAIN NOTED NONE PRSNT: CPT | Mod: CPTII,S$GLB,, | Performed by: STUDENT IN AN ORGANIZED HEALTH CARE EDUCATION/TRAINING PROGRAM

## 2025-06-26 PROCEDURE — 93010 ELECTROCARDIOGRAM REPORT: CPT | Mod: S$GLB,,, | Performed by: INTERNAL MEDICINE

## 2025-06-26 RX ORDER — ASPIRIN 81 MG/1
81 TABLET ORAL DAILY
COMMUNITY

## 2025-06-26 NOTE — PROGRESS NOTES
Electrophysiology Clinic Note    Reason for new patient visit: Evaluation and recommendations regarding possible atrial fibrillation and periods of possibly symptomatic bradycardia.      PRESENTING HISTORY:     History of Present Illness:  Ms. Kaylee Romero is a jaswinder 80-year-old woman who presents today for evaluation and recommendations regarding possible atrial fibrillation and periods of possibly symptomatic bradycardia. She has a past medical history significant for a reported event of paroxysmal atrial fibrillation noted with irregular heart rhythm at a recent echocardiogram, periods of possibly symptomatic bradycardia noted per her manual assessment of her pulse, coronary artery disease with a prior CABG with a LIMA-LAD and SVG-OM1 on 8/12/2019, subsequent 90% stenosis of the distal LIMA graft s/p PCI of LCX and RCA lesions on 10/28/2019, HFpEF with most recent LVEF 60-65%, mild aortic valve stenosis, baseline first degree AV block, hypertension, hyperlipidemia, type II diabetes mellitus, CKD stage III, a prior TIA, osteoarthritis, osteoporosis, chronic low back pain, a history of a left knee replacement, KAMRAN - maintained on CPAP therapy, and morbid obesity with a BMI of 44.     She is followed in general cardiology with Dr. Lee and FREDRICK Lin and was recently seen in clinic on 4/2/2025. At that encounter, they discussed her history of HFpEF with mild aortic valve stenosis. She underwent a subsequent resting 2D transthoracic echocardiogram on 4/9/2025 where she was noted to have preserved systolic ejection fraction with an estimated LVEF of 60-65%, with mild aortic stenosis. Her heart rate was noted to be irregular at the time of this imaging with frequent PACs, and was recorded as an event of atrial fibrillation. There is no confirmed event of atrial fibrillation on review of all serial ECGs or telemetry. She denies any symptoms of palpitations, chest tightness or chest pressure, shortness of  "breath beyond her baseline, dizziness, lightheadedness, syncope, or presyncope. She was started on oral anticoagulation with apixaban 5mg po BID in addition to continued rate control with carvedilol 25mg po BID. She reports that the apixaban has been prohibitively expensive and she has only been taking this medication once per day. She additionally reported significant bruising prior to starting apixaban therapy; she feels that this remains unchanged since starting oral anticoagulation. She subsequently completed a 48-hour Holter monitor and a 30-day ambulatory event monitor that did not capture any events of atrial fibrillation or atrial tachycardia, but did capture occasional PVCs with an overall PVC burden of 2.7%. This monitoring initially reported captured events of atrial fibrillation; however, on personal review of her strips during these events there is clear atrial activity with frequent ectopy with blocked PACs. She has no confirmed evidence of previous atrial fibrillation. She notes that she has been experiencing increased events of fatigue and exercise intolerance, and when she takes her pulse at home during these events, notes that her reading is often in the 50s. This may be secondary to periods of frequent PACs. She remains in sinus rhythm with frequent PACs in a pattern of atrial trigeminy on ECG assessment today.     In discussion with Ms. Romero and her  today, she tells me that she is feeling overall "pretty good". She denies any episodes of dizziness, lightheadedness, syncope, presyncope, chest pain or chest discomfort, palpitations, nausea or vomiting, orthopnea, or PND. She reports baseline mild shortness of breath and dyspnea with exertion that she feels has remained stable. She can climb one flight of stairs prior to needing to take a break and continues to attempt to increase her level of physical activity. She has a stationary bicycle that she rides for about 30 minutes per day with " her baseline shortness of breath.      Review of Systems:  Review of Systems   Constitutional:  Positive for activity change and fatigue.        Mild exercise intolerance.   HENT:  Positive for dental problem. Negative for nasal congestion, nosebleeds, postnasal drip, rhinorrhea, sinus pressure/congestion, sneezing and sore throat.         Expecting a crown that may need to be replaced in the near future.    Respiratory:  Positive for shortness of breath. Negative for apnea, cough, chest tightness and wheezing.    Cardiovascular:  Negative for chest pain, palpitations and leg swelling.   Gastrointestinal:  Negative for abdominal distention, abdominal pain, blood in stool, change in bowel habit, constipation, diarrhea, nausea and vomiting.   Genitourinary:  Negative for dysuria and hematuria.   Musculoskeletal:  Positive for arthralgias and back pain. Negative for gait problem.   Neurological:  Negative for dizziness, seizures, syncope, weakness, light-headedness, headaches and coordination difficulties.        PAST HISTORY:     Past Medical History:   Diagnosis Date    Age-related osteoporosis without current pathological fracture 01/11/2019    Anemia     Chronic kidney disease, stage III (moderate) 04/20/2017    CKD (chronic kidney disease) stage 3, GFR 30-59 ml/min     DDD (degenerative disc disease), cervical 03/27/2024    Dry mouth     History of TIA (transient ischemic attack) 12/11/2018    Hyperlipidemia 12/02/2014    Hypertension     Morbid obesity with body mass index (BMI) of 40.0 or higher 05/09/2016    KAMRAN (obstructive sleep apnea)     KAMRAN on CPAP 06/28/2016    Osteoporosis without current pathological fracture 11/11/2018    Physical deconditioning 11/05/2018    Status post total left knee replacement 5/1/2017 05/16/2017    Type 2 diabetes mellitus with stage 3 chronic kidney disease, without long-term current use of insulin 05/16/2019       Past Surgical History:   Procedure Laterality Date    ankle  surgery (l) Left     APPENDECTOMY      CATARACT EXTRACTION W/  INTRAOCULAR LENS IMPLANT Right 2024        CATARACT EXTRACTION W/  INTRAOCULAR LENS IMPLANT Left 2025         SECTION      CORONARY ARTERY BYPASS GRAFT  09/2019    x 2    CORONARY ARTERY BYPASS GRAFT (CABG) N/A 2019    Procedure: CORONARY ARTERY BYPASS GRAFT (CABG)X3;  Surgeon: Peterson Ventura MD;  Location: [facility] OR [address] [place] [address];  Service: Cardiovascular;  Laterality: N/A;    CORONARY BYPASS GRAFT ANGIOGRAPHY  10/18/2021    Procedure: Bypass graft study;  Surgeon: Jamar Haddad MD;  Location: [facility] CATH LAB;  Service: Cardiology;;    DILATION AND CURETTAGE OF UTERUS      ENDOSCOPIC HARVEST OF VEIN Left 2019    Procedure: SURGICAL PROCUREMENT, VEIN, ENDOSCOPIC;  Surgeon: Peterson Ventura MD;  Location: [address];  Service: Cardiovascular;  Laterality: Left;  Vein [place] Start: 0843; End: 1054   Vein Prep Start: 1055; End: 1145    INTRAOCULAR PROSTHESES INSERTION Right 2024    Procedure: INSERTION, IOL PROSTHESIS;  Surgeon: Sylvia Dela Cruz MD;  Location: [facility] OR [address] [place] [address];  Service: Ophthalmology;  Laterality: Right;    JOINT REPLACEMENT Left     knee replacement    KNEE ARTHROSCOPY W/ DEBRIDEMENT      KNEE SURGERY Left 2017    TKR    LEFT HEART CATHETERIZATION Left 2019    Procedure: Left heart cath;  Surgeon: Ronak Gibbons MD;  Location: [facility] CATH LAB;  Service: Cardiology;  Laterality: Left;    LEFT HEART CATHETERIZATION Left 10/18/2021    Procedure: Left heart cath;  Surgeon: Jamar Haddad MD;  Location: [facility] CATH LAB;  Service: Cardiology;  Laterality: Left;    PHACOEMULSIFICATION OF CATARACT Right 2024    Procedure: PHACOEMULSIFICATION, CATARACT;  Surgeon: Sylvia Dela Cruz MD;  Location: [address];  Service: Ophthalmology;  Laterality: Right;    PHACOEMULSIFICATION, CATARACT, WITH IOL INSERTION Left 2025    Procedure:  "PHACOEMULSIFICATION, CATARACT, WITH IOL INSERTION;  Surgeon: Sylvia Dela Cruz MD;  Location: [facility] OR 1ST FLR;  Service: Ophthalmology;  Laterality: Left;    TOTAL KNEE ARTHROPLASTY Right 03/09/2023    Procedure: ARTHROPLASTY, KNEE, TOTAL:RIGHT;  Surgeon: Peterson Sharma MD;  Location: [facility] OR 2ND FLR;  Service: Orthopedics;  Laterality: Right;    TRANSFORAMINAL EPIDURAL INJECTION OF STEROID Left 11/15/2023    Procedure: LUMBAR TRANSFORAMINAL LEFT L2/3 AND L3/4 DIRECT REFERRAL *PLAVIX CLEARANCE IN CHART*;  Surgeon: Frank Ken MD;  [facility];  Service: Pain Management;  Laterality: Left;       Family History:  Family History   Problem Relation Name Age of Onset    Heart attack Mother      Heart disease Father      Stroke Father      Heart failure Father      Diabetes Father      Arrhythmia Father      No Known Problems Brother      Stroke Paternal Grandfather      No Known Problems Son      Breast cancer Neg Hx      Colon cancer Neg Hx      Ovarian cancer Neg Hx      Amblyopia Neg Hx      Blindness Neg Hx      Cancer Neg Hx      Cataracts Neg Hx      Glaucoma Neg Hx      Hypertension Neg Hx      Macular degeneration Neg Hx      Retinal detachment Neg Hx      Strabismus Neg Hx      Thyroid disease Neg Hx      Esophageal cancer Neg Hx         Social History:  She  reports that she has never smoked. She has never been exposed to tobacco smoke. She has never used smokeless tobacco. She reports current alcohol use of about 1.0 standard drink of alcohol per week. She reports that she does not use drugs.      MEDICATIONS & ALLERGIES:     Review of patient's allergies indicates:   Allergen Reactions    Bactrim [sulfamethoxazole-trimethoprim]      Generic version  Sulfa makes her sick    Keflex [cephalexin]      Turns orange       Medications Ordered Prior to Encounter[1]     OBJECTIVE:     Vital Signs:  BP (!) 103/59 (Patient Position: Sitting)   Pulse 65   Ht 5' 2" (1.575 m)   Wt 108.3 kg " (238 lb 12.1 oz)   LMP 01/09/2001 (Approximate)   BMI 43.67 kg/m²     Physical Exam:  Physical Exam  Constitutional:       General: She is not in acute distress.     Appearance: Normal appearance. She is obese. She is not ill-appearing or diaphoretic.      Comments: Well-appearing woman in NAD, ambulating with a straight cane.    HENT:      Head: Normocephalic and atraumatic.      Nose: Nose normal.      Mouth/Throat:      Mouth: Mucous membranes are moist.      Pharynx: Oropharynx is clear.   Eyes:      Pupils: Pupils are equal, round, and reactive to light.   Cardiovascular:      Rate and Rhythm: Normal rate. Rhythm irregular.      Pulses: Normal pulses.      Heart sounds: Normal heart sounds. No murmur heard.     No friction rub. No gallop.      Comments: Frequent PACs appreciated on right radial artery palpation.   Pulmonary:      Effort: Pulmonary effort is normal. No respiratory distress.      Breath sounds: Normal breath sounds. No wheezing, rhonchi or rales.   Chest:      Chest wall: No tenderness.   Abdominal:      General: There is no distension.      Palpations: Abdomen is soft.      Tenderness: There is no abdominal tenderness.   Musculoskeletal:         General: No swelling or tenderness.      Cervical back: Normal range of motion.      Right lower leg: No edema.      Left lower leg: No edema.   Skin:     General: Skin is warm and dry.      Findings: No erythema, lesion or rash.   Neurological:      General: No focal deficit present.      Mental Status: She is alert and oriented to person, place, and time. Mental status is at baseline.      Motor: No weakness.      Gait: Gait normal.   Psychiatric:         Mood and Affect: Mood normal.         Behavior: Behavior normal.        Laboratory Data:  Lab Results   Component Value Date    WBC 5.78 04/02/2025    HGB 9.8 (L) 04/02/2025    HCT 31.0 (L) 04/02/2025    MCV 85 04/02/2025     04/02/2025     Lab Results   Component Value Date     (H)  06/11/2025     06/11/2025    K 4.7 06/11/2025     06/11/2025    CO2 24 06/11/2025    BUN 25 (H) 06/11/2025    CREATININE 1.2 06/11/2025    CALCIUM 9.5 06/11/2025    MG 1.8 06/21/2022     Lab Results   Component Value Date    INR 1.1 02/16/2023    INR 1.0 10/26/2019    INR 1.1 08/28/2019       Pertinent Cardiac Data:  Personal interpretation of today's ECG: Normal sinus rhythm with first degree AV block and periods of frequent PACs in a pattern of atrial bigeminy, rate of 65 bpm,  ms, QRS 74 ms, QT/QTc 386/401 ms.     Resting 2D Transthoracic Echocardiogram - 9/9/2021:  The left ventricle is normal in size with concentric remodeling and normal systolic function.  The estimated ejection fraction is 60%.  Normal left ventricular diastolic function.  Normal right ventricular size with normal right ventricular systolic function.  Normal central venous pressure (3 mmHg).  The estimated PA systolic pressure is 25 mmHg.    Pharmacologic Nuclear Cardiac Stress Test - PET - 9/24/2021:    There is a small to moderate sized, mild to moderate intensity mid anterolateral reversible perfusion abnormality involving 13% of the LV myocardium in the distribution of theD1 indicative of focal coronary stenosis.    Within the perfusion abnormality, absolute myocardial perfusion (cc/min/gm) averaged 0.81 cc/min/g at rest, 0.82 cc/min/g at stress and CFR was 1.02 , which equates to severely reduced coronary flow capacity within the D1 territory.    There is a small to moderate sized, mild to moderate intensity distal inferolateral resting perfusion abnormality involving 9% of LV myocardium in the distribution of the  LCX territory. After pharmacologic stress, this defect is larger and more severe involving 11% of the LV myocardium indicative of infarct with christin-infarct ischemia.    Within the perfusion abnormality, absolute myocardial perfusion (cc/min/gm) averaged 0.57 cc/min/g at rest, 0.81 cc/min/g at stress and CFR  was 1.39 , which equates to severely reduced coronary flow capacity within the LCX territory.    The whole heart absolute myocardial perfusion values averaged 0.85 cc/min/g at rest, which is normal; 1.38 cc/min/g at stress, which is mildly reduced; and , which is severely reduced.    CT attenuation images demonstrate moderate diffuse coronary calcifications in the LAD and RCA territory.    The gated perfusion images showed an ejection fraction of 76% at rest and 68% during stress. A normal ejection fraction is greater than 53%.    The wall motion is normal at rest.    There is anterolateral wall hypokinesis during stress.    The LV cavity size is normal at rest and stress.    The EKG portion of this study is negative for ischemia.    During stress, rare PVCs are noted.    The patient reported chest pain during the stress test.    When compared to the previous study from 7/15/2020, the perfusion defects are slightly larger and more severe. Coronary flow capacity has decreased in the anterolateral wall defect.    Coronary Angiogram - 10/18/2021:  The Mid LAD lesion was 90% stenosed.  The estimated blood loss was none.  The 1st Mrg lesion was 100% stenosed.  Consider other etiologies of smx.  Patent LIMA to LAD and SVG to OMBl    Resting 2D Transthoracic Echocardiogram - 5/10/2022:  The left ventricle is normal in size with normal systolic function.  There is mild aortic valve stenosis.  Aortic valve area is 1.56 cm2; peak velocity is 2.00 m/s; mean gradient is 8 mmHg.  The estimated ejection fraction is 65%.  Indeterminate left ventricular diastolic function.  Mild left atrial enlargement.  Intermediate central venous pressure (8 mmHg).  The estimated PA systolic pressure is 31 mmHg.  Normal right ventricular size with normal right ventricular systolic function.    Cardiopulmonary Metabolic Exercise Stress Test - 6/23/2022:  There were no arrhythmias during stress.  There was no ST segment deviation noted during  stress.  Severe functional impairment associated with a normal breathing reserve, reduced oxygen saturation with exercise, poor effort versus orthopedic/peripheral, and possibly ( see below) a reduced AT ( the quality of the AT was suboptimal). These findings are indicative of functional impairment secondary to orthopedic vs. peripheral vascula, circulatory insufficiency.  I am not positive that the patient reached the AT so the diagnosis of circulatory impairment is uncertain and unlikely.I suspect that AT was not obtained due to stopping due to knee.  The patient's exercise capacity was severely impaired.  The ECG portion of this study is negative for myocardial ischemia.    Pharmacologic Nuclear Cardiac Stress Test - PET - 7/18/2022:    There is a very small (< 5%) sized, mid anterior reversible perfusion abnormality involving 3% of LV myocardium in the distribution of the D1.    There are no other significant perfusion abnormalities.    The whole heart absolute myocardial perfusion values averaged 0.61 cc/min/g at rest, which is normal; 0.81 cc/min/g at stress, which is severely reduced; and CFR is 1.34 , which is moderately reduced.    The stress flow is severely reduced diffusely throughout the heart consistent with non- response to vasodilator stress or caffeine.    CT attenuation images demonstrate severe diffuse coronary calcifications in the LAD territory and mild diffuse aortic calcifications.    The gated perfusion images showed an ejection fraction of 77% at rest and 66% during stress. A normal ejection fraction is greater than 53%.    The wall motion is normal at rest.    There is mid anterior wall hypokinesis during stress.    The EKG portion of this study is negative for ischemia.    There were no arrhythmias during stress.    The patient reported chest pain during the stress test.    When compared to the previous study from 9/24/2021, there are no significant changes in the perfusion pattern taking  into account different pharmacologic stress agents.    Resting 2D Transthoracic Echocardiogram - 7/22/2022:  The left ventricle is normal in size with concentric remodeling and normal systolic function.  The estimated ejection fraction is 68%.  Indeterminate left ventricular diastolic function.  Normal right ventricular size with normal right ventricular systolic function.  There is mild aortic valve stenosis.  Aortic valve area is 2.18 cm2; peak velocity is 2.04 m/s; mean gradient is 11 mmHg.  Intermediate central venous pressure (8 mmHg).  The estimated PA systolic pressure is 27 mmHg.    Cardiopulmonary Metabolic Exercise Stress Test - 9/29/2022:  Severe functional impairment associated with a normal breathing reserve, normal oxygen stauration, poor effort. These findings are indicative of functional impairment secondary to poor effort.  The ECG portion of this study is negative for myocardial ischemia.  There was no ST segment deviation noted during stress.  The test was stopped because the patient experienced fatigue.  The patient's exercise capacity was severely impaired.  There were no arrhythmias during stress.    Pharmacologic Nuclear Cardiac Stress Test - PET - 8/2/2024:    There are two significant perfusion abnormalities.    Perfusion Abnormality #1 - There is a very small (< 5%) sized, mild intensity mid anterior reversible perfusion abnormality involving 3% of LV myocardium in the distribution of the D1 indicative of focal coronary stenosis.    Perfusion Abnormality #2 - There is a very small (<5%) sized, mild intensity distal inferolateral reversible perfusion abnormality involving 3% of LV myocardium in the distribution of the LCX territory.    There are no other significant perfusion abnormalities.    The whole heart absolute myocardial perfusion values averaged 0.71 cc/min/g at rest, which is normal; 0.94 cc/min/g at stress, which is moderately reduced; and CFR is 1.33, which is moderately  reduced.    CT attenuation images demonstrate moderate diffuse coronary calcifications in the LAD, LCX, RCA and D1 territory and mild diffuse aortic calcifications in the descending aorta.    The gated perfusion images showed an ejection fraction of 79% at rest and 72% during stress. A normal ejection fraction is greater than 47%.    The resting wall motion is normal at rest and at post-stress.    The LV cavity size is normal rest and normal during post-stress.    The ECG portion of the study is negative for ischemia.    There were no arrhythmias during stress.    The patient reported chest pain during the stress test.    When compared to a prior study from 7/18/2022, there are no significant changes.    Resting 2D Transthoracic Echocardiogram - 4/9/2025:    Left Ventricle: The left ventricle is normal in size. Ventricular mass is normal. Normal wall thickness. There is concentric remodeling. Normal wall motion. There is normal systolic function with a visually estimated ejection fraction of 60 - 65%. Unable to assess diastolic function due to atrial fibrillation.    Right Ventricle: The right ventricle has mild enlargement. Systolic function is normal.    Left Atrium: Normal left atrial size.    Right Atrium: Normal right atrial size.    Aortic Valve: There is moderate aortic valve sclerosis. There is mild annular calcification present. Moderately restricted motion. There is mild stenosis. Aortic valve area by VTI is 1.8 cm². Aortic valve peak velocity is 2.4 m/s. Mean gradient is 13 mmHg. The dimensionless index is 0.57.    Mitral Valve: There is mild posterior mitral annular calcification.    Tricuspid Valve: There is trace regurgitation.    Pulmonic Valve: There is trace regurgitation.    Aorta: The Aortic root is normal in size measuring 2.69 cm. The Ascending aorta is normal measuring 2.44 cm.    Pulmonary Artery: The estimated pulmonary artery systolic pressure is 31 mmHg.    IVC/SVC: Intermediate venous  pressure at 8 mmHg.      Personal interpretation of today's Device Interrogation: Personal review of her device interrogation report reveals adequate battery longevity and stable lead parameters. There are no concerning AHR or VHR episodes noted.      48-Hour Holter Monitor - 4/11/2025:   The predominant rhythm is sinus.     30-Day Ambulatory Event Monitor - 5/5/2025:    Baseline rhythm was sinus rhythm with first degree AV block and an initial heart rate of 92 bpm.    There were 3 patient-triggered episodes with no reported symptoms. These episodes corresponded to periods of normal sinus rhythm with rare PACs, at times with first degree AV block.    There were rare PACs.    There were no atrial or ventricular arrhythmias.      ASSESSMENT & PLAN:   Ms. Kaylee Romero is a jaswinder 80-year-old woman who presents today for evaluation and recommendations regarding possible atrial fibrillation and periods of possibly symptomatic bradycardia. She has a past medical history significant for a reported event of paroxysmal atrial fibrillation noted with irregular heart rhythm at a recent echocardiogram, periods of possibly symptomatic bradycardia noted per her manual assessment of her pulse, coronary artery disease with a prior CABG with a LIMA-LAD and SVG-OM1 on 8/12/2019, subsequent 90% stenosis of the distal LIMA graft s/p PCI of LCX and RCA lesions on 10/28/2019, HFpEF with most recent LVEF 60-65%, mild aortic valve stenosis, baseline first degree AV block, hypertension, hyperlipidemia, type II diabetes mellitus, CKD stage III, a prior TIA, osteoarthritis, osteoporosis, chronic low back pain, a history of a left knee replacement, KAMRAN - maintained on CPAP therapy, and morbid obesity with a BMI of 44.     - We reviewed her prior event of possible atrial fibrillation noted at a recent resting 2D transthoracic echocardiogram with periods of irregular heart rhythm captured with imaging. On strip review, this appears more  consistent with sinus rhythm with first-degree AV block with frequent PACs.   - We reviewed her recent 48-hour Holter monitor and 30-day ambulatory event monitor that did not capture any events of atrial fibrillation or atrial tachycardia, but did capture occasional PVCs with an overall PVC burden of 2.7%. This monitoring initially reported captured events of atrial fibrillation; however, on personal review of her strips during these events there is clear atrial activity with frequent ectopy with blocked PACs. She has no confirmed evidence of previous atrial fibrillation.   - She notes that she has been experiencing increased events of fatigue and exercise intolerance, and when she takes her pulse at home during these events, notes that her reading is often in the 50s. This may be secondary to periods of frequent PACs. She remains in sinus rhythm with frequent PACs in a pattern of atrial trigeminy on ECG assessment today.   - We will continue pharmacologic suppression of her PACs with her current carvedilol 25mg po BID. She has no history of high-degree AV block, nor prolonged pauses, on review of multiple ambulatory event monitors.   - We discussed that in the event she develops symptoms with periods of bradycardia, we may need to discuss possible pacemaker implantation. She specifically denies any symptoms of dizziness, lightheadedness, syncope, or presyncope. She was given my office number and instructed to contact me in the event any of these symptoms develop, or should she be noted to have high-degree AV block or prolonged pauses on ECG or future ambulatory monitoring.   - She remains on oral anticoagulation with apixaban 5mg po BID, despite no confirmation of prior atrial fibrillation events. We will continue this medication at this time given her elevated [id number] score, but may be able to discontinue this agent in the event she continues to remain arrhythmia-free. We will continue to monitor, and may  "consider cessation of oral anticoagulation at our next encounter.     This patient will return to clinic with me in three months with continued PCP and general cardiology visits in the interim. All questions and concerns were addressed at this encounter. Total time spent in this patient encounter: 64 minutes.     Signing Physician:       OSCAR Whiteside MD  Electrophysiology Attending         [1]   Current Outpatient Medications on File Prior to Visit   Medication Sig Dispense Refill    acetaminophen (TYLENOL) 500 MG tablet Take 500 mg by mouth 2 (two) times daily as needed for Pain.      amLODIPine (NORVASC) 10 MG tablet TAKE 1 TABLET(10 MG) BY MOUTH EVERY DAY 90 tablet 1    apixaban (ELIQUIS) 5 mg Tab Take 1 tablet (5 mg total) by mouth 2 (two) times daily. 60 tablet 11    aspirin (ECOTRIN) 81 MG EC tablet Take 81 mg by mouth once daily.      atorvastatin (LIPITOR) 80 MG tablet Take 1 tablet (80 mg total) by mouth once daily. 90 tablet 1    BD BOO 2ND GEN PEN NEEDLE 32 gauge x 5/32" Ndle To injection daily 100 each 8    blood sugar diagnostic Strp 1 strip by Misc.(Non-Drug; Combo Route) route once daily. 100 strip 3    blood-glucose meter,continuous (DEXCOM G7 ) Misc To use with sensor 1 each 0    blood-glucose sensor (DEXCOM G7 SENSOR) Areli To change every 10 days 3 each 3    carvediloL (COREG) 25 MG tablet Take 1 tablet (25 mg total) by mouth 2 (two) times daily. 180 tablet 2    diphenoxylate-atropine 2.5-0.025 mg (LOMOTIL) 2.5-0.025 mg per tablet Take 1 tablet by mouth 4 (four) times daily as needed. for diarrhea 30 tablet 0    FEROSUL 325 mg (65 mg iron) Tab tablet TAKE 1 TABLET BY MOUTH DAILY 90 tablet 1    furosemide (LASIX) 20 MG tablet TAKE 1 TABLET BY MOUTH ON SATURDAY, SUNDAY, MONDAY, WEDNESDAY, FRIDAY 90 tablet 3    gabapentin (NEURONTIN) 100 MG capsule Take 1-2 capsules (100-200 mg total) by mouth every evening. 90 capsule 2    glipiZIDE (GLUCOTROL) 5 MG tablet TAKE 2 TABLETS(10 MG) BY MOUTH " TWICE DAILY WITH MEALS 360 tablet 3    glucagon (GVOKE HYPOPEN 2-PACK) 1 mg/0.2 mL AtIn Inject 1 Package into the skin as needed. 2 Syringe 0    irbesartan (AVAPRO) 300 MG tablet Take 1 tablet (300 mg total) by mouth every evening. 90 tablet 2    isosorbide mononitrate (IMDUR) 30 MG 24 hr tablet Take 2 tablets (60 mg total) by mouth once daily. 180 tablet 3    LIDOcaine (LIDODERM) 5 % Place 1 patch onto the skin once daily. Remove & Discard patch within 12 hours or as directed by MD 14 patch 0    multivitamin (THERAGRAN) per tablet Take 1 tablet by mouth once daily.      mupirocin (BACTROBAN) 2 % ointment Apply topically 3 (three) times daily. 22 g 0    nitroGLYCERIN (NITROSTAT) 0.4 MG SL tablet Place 1 tablet (0.4 mg total) under the tongue every 5 (five) minutes as needed for Chest pain. 100 tablet 1    vitamin D (VITAMIN D3) 1000 units Tab Take 1,000 Units by mouth twice a week. Monday AND THURSDAYS ONLY      busPIRone (BUSPAR) 5 MG Tab Take 1 tablet (5 mg total) by mouth 2 (two) times daily. For Anxiety. (Patient not taking: Reported on 6/26/2025) 60 tablet 2    semaglutide (OZEMPIC) 2 mg/dose (8 mg/3 mL) PnIj Inject 2 mg into the skin every 7 days. (Patient not taking: Reported on 6/26/2025) 3 mL 11     No current facility-administered medications on file prior to visit.

## 2025-06-27 ENCOUNTER — PATIENT MESSAGE (OUTPATIENT)
Dept: ENDOCRINOLOGY | Facility: CLINIC | Age: 80
End: 2025-06-27
Payer: MEDICARE

## 2025-06-27 ENCOUNTER — PATIENT MESSAGE (OUTPATIENT)
Dept: ELECTROPHYSIOLOGY | Facility: CLINIC | Age: 80
End: 2025-06-27
Payer: MEDICARE

## 2025-06-30 ENCOUNTER — RESULTS FOLLOW-UP (OUTPATIENT)
Dept: ENDOCRINOLOGY | Facility: CLINIC | Age: 80
End: 2025-06-30

## 2025-06-30 RX ORDER — FUROSEMIDE 20 MG/1
TABLET ORAL
Qty: 90 TABLET | Refills: 3 | Status: SHIPPED | OUTPATIENT
Start: 2025-06-30

## 2025-06-30 RX ORDER — ISOSORBIDE MONONITRATE 30 MG/1
TABLET, EXTENDED RELEASE ORAL
Qty: 180 TABLET | Refills: 3 | Status: SHIPPED | OUTPATIENT
Start: 2025-06-30

## 2025-07-02 ENCOUNTER — TELEPHONE (OUTPATIENT)
Dept: ELECTROPHYSIOLOGY | Facility: CLINIC | Age: 80
End: 2025-07-02
Payer: MEDICARE

## 2025-07-02 NOTE — TELEPHONE ENCOUNTER
----- Message from Fozia sent at 7/2/2025 11:52 AM CDT -----  Regarding: Medication issues  Pt [phone] would like a call to discuss some medication concerns she's having.    Thanks

## 2025-07-02 NOTE — TELEPHONE ENCOUNTER
Spoke with patient. She stated she is very concerned about taking Eliquis and additionally concerned with taking Eliquis and Aspirin. Patient stated she has not had any recorded episodes of Afib and is uncertain why it is recommended for her to continue taking Eliquis. Patient verbalized concerns related to possibility of bleeding from blood thinners. Patient would like to follow up with Dr. Whiteside in regards to the recommendation to continue Eliquis for 3 more months. Patient stated she is uneasy with taking Eliquis and not sure how this medication helps with heart block. Discussed rationale for Eliquis as it relates to Afib and risk for strokes with patient. Will follow up with Dr. Louis regarding patient's concerns and follow up with patient. Pt verbalized understanding.

## 2025-07-16 ENCOUNTER — LAB VISIT (OUTPATIENT)
Dept: LAB | Facility: HOSPITAL | Age: 80
End: 2025-07-16
Attending: NURSE PRACTITIONER
Payer: MEDICARE

## 2025-07-16 DIAGNOSIS — R94.6 ABNORMAL THYROID FUNCTION TEST: ICD-10-CM

## 2025-07-16 PROCEDURE — 36415 COLL VENOUS BLD VENIPUNCTURE: CPT | Mod: PN

## 2025-07-16 PROCEDURE — 84443 ASSAY THYROID STIM HORMONE: CPT

## 2025-07-16 PROCEDURE — 86376 MICROSOMAL ANTIBODY EACH: CPT

## 2025-07-17 LAB
THYROPEROXIDASE IGG SERPL-ACNC: <6 IU/ML
TSH SERPL-ACNC: 3.11 UIU/ML (ref 0.4–4)

## 2025-07-24 ENCOUNTER — OFFICE VISIT (OUTPATIENT)
Dept: PRIMARY CARE CLINIC | Facility: CLINIC | Age: 80
End: 2025-07-24
Payer: MEDICARE

## 2025-07-24 ENCOUNTER — LAB VISIT (OUTPATIENT)
Dept: LAB | Facility: HOSPITAL | Age: 80
End: 2025-07-24
Attending: INTERNAL MEDICINE
Payer: MEDICARE

## 2025-07-24 VITALS
HEIGHT: 62 IN | BODY MASS INDEX: 43.57 KG/M2 | WEIGHT: 236.75 LBS | RESPIRATION RATE: 19 BRPM | DIASTOLIC BLOOD PRESSURE: 60 MMHG | OXYGEN SATURATION: 98 % | SYSTOLIC BLOOD PRESSURE: 108 MMHG | HEART RATE: 93 BPM

## 2025-07-24 DIAGNOSIS — Z23 NEED FOR DIPHTHERIA-TETANUS-PERTUSSIS (TDAP) VACCINE: ICD-10-CM

## 2025-07-24 DIAGNOSIS — N18.32 TYPE 2 DIABETES MELLITUS WITH STAGE 3B CHRONIC KIDNEY DISEASE, WITHOUT LONG-TERM CURRENT USE OF INSULIN: ICD-10-CM

## 2025-07-24 DIAGNOSIS — I11.0 HYPERTENSIVE HEART DISEASE WITH CHRONIC DIASTOLIC CONGESTIVE HEART FAILURE: Primary | ICD-10-CM

## 2025-07-24 DIAGNOSIS — I70.0 AORTIC ATHEROSCLEROSIS: ICD-10-CM

## 2025-07-24 DIAGNOSIS — I25.118 CORONARY ARTERY DISEASE OF NATIVE ARTERY OF NATIVE HEART WITH STABLE ANGINA PECTORIS: ICD-10-CM

## 2025-07-24 DIAGNOSIS — Z12.31 ENCOUNTER FOR SCREENING MAMMOGRAM FOR BREAST CANCER: ICD-10-CM

## 2025-07-24 DIAGNOSIS — E66.813 CLASS 3 SEVERE OBESITY DUE TO EXCESS CALORIES WITH SERIOUS COMORBIDITY AND BODY MASS INDEX (BMI) OF 40.0 TO 44.9 IN ADULT: ICD-10-CM

## 2025-07-24 DIAGNOSIS — E11.22 TYPE 2 DIABETES MELLITUS WITH STAGE 3B CHRONIC KIDNEY DISEASE, WITHOUT LONG-TERM CURRENT USE OF INSULIN: ICD-10-CM

## 2025-07-24 DIAGNOSIS — I50.32 HYPERTENSIVE HEART DISEASE WITH CHRONIC DIASTOLIC CONGESTIVE HEART FAILURE: Primary | ICD-10-CM

## 2025-07-24 DIAGNOSIS — M81.0 OSTEOPOROSIS WITHOUT CURRENT PATHOLOGICAL FRACTURE, UNSPECIFIED OSTEOPOROSIS TYPE: ICD-10-CM

## 2025-07-24 LAB
CHOLEST SERPL-MCNC: 77 MG/DL (ref 120–199)
CHOLEST/HDLC SERPL: 2.5 {RATIO} (ref 2–5)
HDLC SERPL-MCNC: 31 MG/DL (ref 40–75)
HDLC SERPL: 40.3 % (ref 20–50)
LDLC SERPL CALC-MCNC: 33.4 MG/DL (ref 63–159)
NONHDLC SERPL-MCNC: 46 MG/DL
TRIGL SERPL-MCNC: 63 MG/DL (ref 30–150)

## 2025-07-24 PROCEDURE — 99999 PR PBB SHADOW E&M-EST. PATIENT-LVL V: CPT | Mod: PBBFAC,,, | Performed by: INTERNAL MEDICINE

## 2025-07-24 PROCEDURE — 80061 LIPID PANEL: CPT

## 2025-07-24 PROCEDURE — 3074F SYST BP LT 130 MM HG: CPT | Mod: CPTII,S$GLB,, | Performed by: INTERNAL MEDICINE

## 2025-07-24 PROCEDURE — 1126F AMNT PAIN NOTED NONE PRSNT: CPT | Mod: CPTII,S$GLB,, | Performed by: INTERNAL MEDICINE

## 2025-07-24 PROCEDURE — 82570 ASSAY OF URINE CREATININE: CPT | Performed by: INTERNAL MEDICINE

## 2025-07-24 PROCEDURE — 3078F DIAST BP <80 MM HG: CPT | Mod: CPTII,S$GLB,, | Performed by: INTERNAL MEDICINE

## 2025-07-24 PROCEDURE — 99214 OFFICE O/P EST MOD 30 MIN: CPT | Mod: S$GLB,,, | Performed by: INTERNAL MEDICINE

## 2025-07-24 PROCEDURE — 3288F FALL RISK ASSESSMENT DOCD: CPT | Mod: CPTII,S$GLB,, | Performed by: INTERNAL MEDICINE

## 2025-07-24 PROCEDURE — 1160F RVW MEDS BY RX/DR IN RCRD: CPT | Mod: CPTII,S$GLB,, | Performed by: INTERNAL MEDICINE

## 2025-07-24 PROCEDURE — 36415 COLL VENOUS BLD VENIPUNCTURE: CPT | Mod: PN

## 2025-07-24 PROCEDURE — G2211 COMPLEX E/M VISIT ADD ON: HCPCS | Mod: S$GLB,,, | Performed by: INTERNAL MEDICINE

## 2025-07-24 PROCEDURE — 1159F MED LIST DOCD IN RCRD: CPT | Mod: CPTII,S$GLB,, | Performed by: INTERNAL MEDICINE

## 2025-07-24 PROCEDURE — 1101F PT FALLS ASSESS-DOCD LE1/YR: CPT | Mod: CPTII,S$GLB,, | Performed by: INTERNAL MEDICINE

## 2025-07-24 RX ORDER — AMLODIPINE BESYLATE 10 MG/1
10 TABLET ORAL DAILY
Qty: 90 TABLET | Refills: 3 | Status: SHIPPED | OUTPATIENT
Start: 2025-07-24

## 2025-07-24 RX ORDER — CARVEDILOL 25 MG/1
25 TABLET ORAL 2 TIMES DAILY
Qty: 180 TABLET | Refills: 3 | Status: SHIPPED | OUTPATIENT
Start: 2025-07-24

## 2025-07-24 RX ORDER — IRBESARTAN 300 MG/1
300 TABLET ORAL NIGHTLY
Qty: 90 TABLET | Refills: 3 | Status: SHIPPED | OUTPATIENT
Start: 2025-07-24

## 2025-07-24 RX ORDER — ATORVASTATIN CALCIUM 80 MG/1
80 TABLET, FILM COATED ORAL DAILY
Qty: 90 TABLET | Refills: 3 | Status: SHIPPED | OUTPATIENT
Start: 2025-07-24
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
## 2025-07-25 LAB
ALBUMIN/CREAT UR: 13.3 UG/MG
CREAT UR-MCNC: 173 MG/DL (ref 15–325)
MICROALBUMIN UR-MCNC: 23 UG/ML (ref ?–5000)

## 2025-07-30 ENCOUNTER — PATIENT MESSAGE (OUTPATIENT)
Dept: ADMINISTRATIVE | Facility: OTHER | Age: 80
End: 2025-07-30
Payer: MEDICARE

## 2025-07-30 ENCOUNTER — PATIENT MESSAGE (OUTPATIENT)
Dept: PRIMARY CARE CLINIC | Facility: CLINIC | Age: 80
End: 2025-07-30
Payer: MEDICARE

## 2025-07-30 NOTE — TELEPHONE ENCOUNTER
LOV with Liz Roberts MD , 7/24/2025  Recent note mentioned wheezing and chr exertional dyspnea unchanged.   Pt states it was discussed possibly ordering a chest xray. Pt reports that she is still having issues and asking if you recommend the chest xray or anything else at this time

## 2025-08-10 ENCOUNTER — PATIENT MESSAGE (OUTPATIENT)
Dept: HEMATOLOGY/ONCOLOGY | Facility: CLINIC | Age: 80
End: 2025-08-10
Payer: MEDICARE

## 2025-08-10 ENCOUNTER — PATIENT MESSAGE (OUTPATIENT)
Dept: ADMINISTRATIVE | Facility: OTHER | Age: 80
End: 2025-08-10
Payer: MEDICARE

## 2025-08-26 ENCOUNTER — HOSPITAL ENCOUNTER (OUTPATIENT)
Dept: RADIOLOGY | Facility: HOSPITAL | Age: 80
Discharge: HOME | End: 2025-08-26
Attending: INTERNAL MEDICINE
Payer: MEDICARE

## 2025-08-26 DIAGNOSIS — Z12.31 ENCOUNTER FOR SCREENING MAMMOGRAM FOR BREAST CANCER: ICD-10-CM

## 2025-08-26 PROCEDURE — 77067 SCR MAMMO BI INCL CAD: CPT | Mod: TC

## 2025-09-03 ENCOUNTER — OFFICE VISIT (OUTPATIENT)
Dept: PODIATRY | Facility: CLINIC | Age: 80
End: 2025-09-03
Payer: MEDICARE

## 2025-09-03 VITALS
SYSTOLIC BLOOD PRESSURE: 118 MMHG | DIASTOLIC BLOOD PRESSURE: 57 MMHG | BODY MASS INDEX: 43.57 KG/M2 | WEIGHT: 236.75 LBS | HEIGHT: 62 IN | HEART RATE: 83 BPM

## 2025-09-03 DIAGNOSIS — B35.1 DERMATOPHYTOSIS OF NAIL: ICD-10-CM

## 2025-09-03 DIAGNOSIS — L84 CORN OR CALLUS: ICD-10-CM

## 2025-09-03 DIAGNOSIS — E11.9 ENCOUNTER FOR DIABETIC FOOT EXAM: Primary | ICD-10-CM

## 2025-09-03 DIAGNOSIS — E11.49 TYPE II DIABETES MELLITUS WITH NEUROLOGICAL MANIFESTATIONS: ICD-10-CM

## 2025-09-03 PROCEDURE — 99999 PR PBB SHADOW E&M-EST. PATIENT-LVL IV: CPT | Mod: PBBFAC,,, | Performed by: PODIATRIST

## (undated) DEVICE — BLADE ELECTRO EDGE INSULATED

## (undated) DEVICE — BANDAGE ACE DOUBLE STER 6IN

## (undated) DEVICE — WIPE ESENTA BARR STNG FREE 3ML

## (undated) DEVICE — ELECTRODE REM PLYHSV RETURN 9

## (undated) DEVICE — GOWN POLY REINF BRTH SLV 3XL

## (undated) DEVICE — DRAIN CHEST DRY SUCTION

## (undated) DEVICE — SUT 6 18IN STEEL MONO CCS

## (undated) DEVICE — Device

## (undated) DEVICE — TIP PHACO 45 DEGREE KELMAN

## (undated) DEVICE — KIT CUSTOM MANIFOLD

## (undated) DEVICE — BRUSH SURGICAL SCRUB STERILE

## (undated) DEVICE — SYR 3CC 21 X 1 LUER LOCK

## (undated) DEVICE — WIRE INTRAMYOCARDIAL TEMP

## (undated) DEVICE — KIT CO-PILOT

## (undated) DEVICE — SOL BETADINE 5%

## (undated) DEVICE — COVER MAYO STND XL 30X57IN

## (undated) DEVICE — BANDAGE ESMARK 6X12

## (undated) DEVICE — SUT PROLENE 4-0 SH BLU 36IN

## (undated) DEVICE — BLADE 4IN EDGE INSULATED

## (undated) DEVICE — CATH ANG PIGTAIL 4FR INFINITY

## (undated) DEVICE — PAD K-THERMIA 24IN X 60IN

## (undated) DEVICE — SUT VICRYL BR 1 GEN 27 CT-1

## (undated) DEVICE — SUT PROLENE 7-0 BV-1 30

## (undated) DEVICE — GUIDEWIRE SUPRA CORE 035 190CM

## (undated) DEVICE — BLOWER MISTER

## (undated) DEVICE — PAD COLD THERAPY KNEE WRAP ON

## (undated) DEVICE — RETRACTOR OCTOBASE INSERT HOLD

## (undated) DEVICE — GUIDE VISTA XB 3.5

## (undated) DEVICE — SOL IRR BSS OPHTH 500ML STRL

## (undated) DEVICE — SUT 4-0 12-18IN SILK BLACK

## (undated) DEVICE — SEE MEDLINE ITEM 152515

## (undated) DEVICE — BRUSH SCRUB SURGICALW/BETADINE

## (undated) DEVICE — GAUZE SPONGE 4X4 12PLY

## (undated) DEVICE — SCREW HEX HEAD
Type: IMPLANTABLE DEVICE | Site: KNEE | Status: NON-FUNCTIONAL
Removed: 2017-05-01

## (undated) DEVICE — DRAPE STERI INSTRUMENT 1018

## (undated) DEVICE — DRESSING TRANS 4X4 TEGADERM

## (undated) DEVICE — PROTECTION STATION PLUS

## (undated) DEVICE — SPONGE LAP 18X18 PREWASHED

## (undated) DEVICE — ADHESIVE DERMABOND ADVANCED

## (undated) DEVICE — TOURNIQUET SB QC DP 34X4IN

## (undated) DEVICE — DRESSING TELFA STRL 4X3 LF

## (undated) DEVICE — SPIKE CONTRAST CONTROLLER

## (undated) DEVICE — SUT SILK BLK BR. 2 2-60

## (undated) DEVICE — PAD CAST SPECIALIST STRL 6

## (undated) DEVICE — NDL 18GA X1 1/2 REG BEVEL

## (undated) DEVICE — SEE MEDLINE ITEM 156894

## (undated) DEVICE — OMNIPAQUE 350 200ML

## (undated) DEVICE — DRESSING AQUACEL AG ADV 3.5X12

## (undated) DEVICE — BOWL CEMENT

## (undated) DEVICE — GLOVE BIOGEL ECLIPSE SZ 6.5

## (undated) DEVICE — SEE MEDLINE ITEM 146271

## (undated) DEVICE — SEE MEDLINE ITEM 157187

## (undated) DEVICE — SOL WATER STRL IRR 1000ML

## (undated) DEVICE — DRAPE OPHTHALMIC 48X62 FEN

## (undated) DEVICE — SYR 3CC LUER LOC

## (undated) DEVICE — SYS VIRTUOSAPH PLUS EVM

## (undated) DEVICE — SUT PROLENE 4-0 RB-1 BL MO

## (undated) DEVICE — KIT MICROINTRO 4F .018X40X7CM

## (undated) DEVICE — DRESSING ADH ISLAND 3.6 X 14

## (undated) DEVICE — GUIDEWIRE AMPLATZ 145CM J-SS

## (undated) DEVICE — KIT SAHARA DRAPE DRAW/LIFT

## (undated) DEVICE — TRAY HEART

## (undated) DEVICE — DRAPE SLUSH WARMER WITH DISC

## (undated) DEVICE — GUIDEWIRE ANG STF .035INX18CM

## (undated) DEVICE — CLOSURE SKIN STERI STRIP 1/2X4

## (undated) DEVICE — SUT 2/0 30IN ETHIBOND

## (undated) DEVICE — BLADE RECIP RIBBED

## (undated) DEVICE — CATH DXTERITY JL40 100CM 6FR

## (undated) DEVICE — CATH JACKY RADIAL 5FR 100CM

## (undated) DEVICE — WIRE GUIDE SAFE-T-J .035 260CM

## (undated) DEVICE — CUBE COLD THERAPY POLAR PAD

## (undated) DEVICE — GUIDEWIRE CHOICE PT  XS 182CM

## (undated) DEVICE — KIT TOTAL KNEE TKOFG OMC

## (undated) DEVICE — SEE MEDLINE ITEM 152487

## (undated) DEVICE — MASK FLYTE HOOD PEEL AWAY

## (undated) DEVICE — INSERTS STEALTH FIBRA SIZE 5

## (undated) DEVICE — HOOD T-5 TEAR AWAY STERILE

## (undated) DEVICE — TUBE SUCTION KAMVAC MINI 20/BX

## (undated) DEVICE — KIT GLIDESHEATH SLEND 6FR 10CM

## (undated) DEVICE — DRESSING LEUKOPLAST FLEX 1X3IN

## (undated) DEVICE — GOWN SURGICAL X-LARGE

## (undated) DEVICE — CATH DXTERITY JR40 100CM 6FR

## (undated) DEVICE — DRAPE SPLIT STERILE

## (undated) DEVICE — SOL IRR NACL .9% 3000ML

## (undated) DEVICE — DEVICE PERCLOSE SUT CLSR 6FR

## (undated) DEVICE — SUT MONOCRYL 3-0 PS-2 UND

## (undated) DEVICE — CANNULA ANTERIOR CHAMBER .80MM

## (undated) DEVICE — INFLATOR ENCORE 26 BLLN INFL

## (undated) DEVICE — GLOVE BIOGEL SKINSENSE PI 8.0

## (undated) DEVICE — BLADE RECIP DS OFST 70X1X12.5

## (undated) DEVICE — SYR 30CC LUER LOCK

## (undated) DEVICE — SUT MONOCRYL 3-0 SH U/D

## (undated) DEVICE — KIT GREY EYE

## (undated) DEVICE — SEE MEDLINE ITEM 157131

## (undated) DEVICE — KIT URINARY CATH URINE METER

## (undated) DEVICE — GUIDEWIRE STD .035X180CM ANG

## (undated) DEVICE — SEE MEDLINE ITEM 157117

## (undated) DEVICE — SET DECANTER MEDICHOICE

## (undated) DEVICE — SEE MEDLINE ITEM 146417

## (undated) DEVICE — CANNULA VESSEL FREE FLOW

## (undated) DEVICE — TUBING HF INSUFFLATION W/ FLTR

## (undated) DEVICE — GUIDE VISTA 6FR JR 4

## (undated) DEVICE — SHIELD COLLAGEN 12HR CORNEAL

## (undated) DEVICE — DRESSING AQUACEL ADH 4X10IN

## (undated) DEVICE — DRESSING AQUACEL SACRAL 9 X 9

## (undated) DEVICE — BANDAGE ACE ELASTIC 6"

## (undated) DEVICE — PLEDGET SUT SOFT 3/8X3/16X1/16

## (undated) DEVICE — SET CEMENT (SCULP)

## (undated) DEVICE — PULSAVAC ZIMMER

## (undated) DEVICE — PUMP COLD THERAPY

## (undated) DEVICE — DRAPE EYE POUCH FEN INCISE

## (undated) DEVICE — SCREW HEX HEAD 3.5X38MM
Type: IMPLANTABLE DEVICE | Site: KNEE | Status: NON-FUNCTIONAL
Removed: 2017-05-01

## (undated) DEVICE — HEMOSTAT VASC BAND REG 24CM

## (undated) DEVICE — CLIP SPRING 6MM

## (undated) DEVICE — BLADE BEAVER 15 DEG 1.5MM

## (undated) DEVICE — SUT VICRYL 2-0 36 CT-1

## (undated) DEVICE — SUT VICRYL 3-0 27 SH

## (undated) DEVICE — SUT 6/0 4-24IN PROLENE

## (undated) DEVICE — SEE MEDLINE ITEM 146298

## (undated) DEVICE — CATH IMA INFINITI 4FRX100CM

## (undated) DEVICE — SHEATH INTRODUCER 6FR 11CM

## (undated) DEVICE — SUT 2/0 36IN COATED VICRYL

## (undated) DEVICE — DRAPE THREE-QTR REINF 53X77IN

## (undated) DEVICE — SYR ONLY LUER LOCK 20CC

## (undated) DEVICE — KIT IRR SUCTION HND PIECE

## (undated) DEVICE — ELECTRODE BLD 1 INCH TEFLON

## (undated) DEVICE — COVER BAND BAG 28 X 12

## (undated) DEVICE — BLADE SAG 18.0X1.27X100

## (undated) DEVICE — BLADE SAG 13.0 X1.27X90

## (undated) DEVICE — GUIDEWIRE STF .035X180CM STR

## (undated) DEVICE — SHEATH INTRODUCER 4FR 11CM

## (undated) DEVICE — SUT SILK 2-0 SH 18IN BLACK

## (undated) DEVICE — SEE MEDLINE ITEM 154981

## (undated) DEVICE — TAPE CURAD SILK ADH 2INX10YD

## (undated) DEVICE — GUIDEWIRE STF .035X180CM ANG

## (undated) DEVICE — DRAPE T EXTRM SURG 121X128X90

## (undated) DEVICE — CATH JR4 5FR

## (undated) DEVICE — KIT TOTAL KNEE TKOFG